# Patient Record
Sex: FEMALE | Race: WHITE | NOT HISPANIC OR LATINO | Employment: OTHER | URBAN - METROPOLITAN AREA
[De-identification: names, ages, dates, MRNs, and addresses within clinical notes are randomized per-mention and may not be internally consistent; named-entity substitution may affect disease eponyms.]

---

## 2017-03-22 ENCOUNTER — HOSPITAL ENCOUNTER (OUTPATIENT)
Dept: PAIN MEDICINE | Facility: HOSPITAL | Age: 77
Discharge: HOME | End: 2017-03-22
Attending: ANESTHESIOLOGY | Admitting: ANESTHESIOLOGY

## 2017-03-22 LAB
AMPHETAMINES UR QL SCN: NEGATIVE
BZE UR QL SCN: NEGATIVE
CREAT 24H UR-MCNC: NORMAL MG/DL
METHADONE UR QL SCN: NEGATIVE
OPIATE CONFIRMATION URINE: NORMAL
THC SERPLBLD CFM-MCNC: NEGATIVE NG/ML

## 2017-04-12 ENCOUNTER — HOSPITAL ENCOUNTER (OUTPATIENT)
Dept: PAIN MEDICINE | Facility: HOSPITAL | Age: 77
Discharge: HOME | End: 2017-04-12
Attending: ANESTHESIOLOGY | Admitting: ANESTHESIOLOGY

## 2017-05-18 ENCOUNTER — HOSPITAL ENCOUNTER (OUTPATIENT)
Dept: PAIN MEDICINE | Facility: HOSPITAL | Age: 77
Discharge: HOME | End: 2017-05-18
Attending: ANESTHESIOLOGY | Admitting: ANESTHESIOLOGY

## 2017-07-17 ENCOUNTER — HOSPITAL ENCOUNTER (OUTPATIENT)
Dept: PAIN MEDICINE | Facility: HOSPITAL | Age: 77
Discharge: HOME | End: 2017-07-17
Attending: ANESTHESIOLOGY | Admitting: ANESTHESIOLOGY

## 2017-09-01 ENCOUNTER — HOSPITAL ENCOUNTER (OUTPATIENT)
Dept: MRI IMAGING | Facility: HOSPITAL | Age: 77
Discharge: HOME | End: 2017-09-01
Attending: ANESTHESIOLOGY | Admitting: ANESTHESIOLOGY

## 2017-09-11 ENCOUNTER — HOSPITAL ENCOUNTER (OUTPATIENT)
Dept: PAIN MEDICINE | Facility: HOSPITAL | Age: 77
Discharge: HOME | End: 2017-09-11
Attending: ANESTHESIOLOGY | Admitting: ANESTHESIOLOGY

## 2017-09-29 ENCOUNTER — HOSPITAL ENCOUNTER (OUTPATIENT)
Dept: PAIN MEDICINE | Facility: HOSPITAL | Age: 77
Discharge: HOME | End: 2017-09-29
Attending: ANESTHESIOLOGY | Admitting: ANESTHESIOLOGY

## 2017-10-06 ENCOUNTER — TELEPHONE (OUTPATIENT)
Dept: NEUROSURGERY | Facility: CLINIC | Age: 77
End: 2017-10-06

## 2017-10-06 NOTE — TELEPHONE ENCOUNTER
Maxim requested Pt be moved up to morning appt @ [facility] due to afternoon hospital rounds.  Pt at first moved to 11:15am, then called back stating there is absolutely no way she can do a morning appointment AT ALL due to her arthritis.  She needs afternoon, as late as possible.  I gently explained that at this location, Dr Pan wants his morning full before he schedules afternoon and we fill from the top down.  Pt states that's fine, to just cancel her until she can be guaranteed the latest appointment of the day.  Routed note to New Pt appt  for FYI, Intake paperwork will be placed on her desk for holding.

## 2017-10-27 ENCOUNTER — HOSPITAL ENCOUNTER (OUTPATIENT)
Dept: PAIN MEDICINE | Facility: HOSPITAL | Age: 77
Discharge: HOME | End: 2017-10-27
Attending: ANESTHESIOLOGY | Admitting: ANESTHESIOLOGY

## 2017-12-15 ENCOUNTER — HOSPITAL ENCOUNTER (OUTPATIENT)
Dept: PAIN MEDICINE | Facility: HOSPITAL | Age: 77
Discharge: HOME | End: 2017-12-15
Attending: ANESTHESIOLOGY | Admitting: ANESTHESIOLOGY

## 2017-12-21 ENCOUNTER — HOSPITAL ENCOUNTER (OUTPATIENT)
Dept: PAIN MEDICINE | Facility: HOSPITAL | Age: 77
Discharge: HOME | End: 2017-12-21
Attending: PHYSICAL MEDICINE & REHABILITATION | Admitting: PHYSICAL MEDICINE & REHABILITATION

## 2018-02-09 ENCOUNTER — HOSPITAL ENCOUNTER (OUTPATIENT)
Dept: PAIN MEDICINE | Facility: HOSPITAL | Age: 78
Discharge: HOME | End: 2018-02-09
Attending: ANESTHESIOLOGY | Admitting: ANESTHESIOLOGY

## 2018-03-20 ENCOUNTER — HOSPITAL ENCOUNTER (OUTPATIENT)
Dept: PAIN MEDICINE | Facility: HOSPITAL | Age: 78
Discharge: HOME | End: 2018-03-20
Attending: ANESTHESIOLOGY | Admitting: ANESTHESIOLOGY

## 2018-04-13 ENCOUNTER — HOSPITAL ENCOUNTER (OUTPATIENT)
Dept: PAIN MEDICINE | Facility: HOSPITAL | Age: 78
Discharge: HOME | End: 2018-04-13
Attending: ANESTHESIOLOGY | Admitting: ANESTHESIOLOGY

## 2018-04-25 ENCOUNTER — HOSPITAL ENCOUNTER (OUTPATIENT)
Dept: PAIN MEDICINE | Facility: HOSPITAL | Age: 78
Discharge: HOME | End: 2018-04-25
Attending: ANESTHESIOLOGY | Admitting: ANESTHESIOLOGY

## 2018-05-09 ENCOUNTER — HOSPITAL ENCOUNTER (OUTPATIENT)
Dept: ORTHOPEDIC SURGERY | Facility: CLINIC | Age: 78
Discharge: HOME | End: 2018-05-09
Attending: ORTHOPAEDIC SURGERY | Admitting: ORTHOPAEDIC SURGERY

## 2018-05-17 ENCOUNTER — HOSPITAL ENCOUNTER (OUTPATIENT)
Dept: PAIN MEDICINE | Facility: HOSPITAL | Age: 78
Discharge: HOME | End: 2018-05-17
Attending: ANESTHESIOLOGY | Admitting: ANESTHESIOLOGY

## 2018-07-09 ENCOUNTER — HOSPITAL ENCOUNTER (OUTPATIENT)
Dept: LAB | Facility: HOSPITAL | Age: 78
Discharge: HOME | End: 2018-07-09
Attending: ORTHOPAEDIC SURGERY | Admitting: ORTHOPAEDIC SURGERY

## 2018-07-09 LAB
BILIRUB UR QL STRIP: NEGATIVE MG/DL
CASTS URNS QL MICRO: ABNORMAL /[LPF]
COLOR UR: YELLOW
CONV BACTERIA IN URINE MICRO: NEGATIVE
CONV CLARITY OF URINE: ABNORMAL
CONV HYALINE CASTS IN URINE MICRO: ABNORMAL /[LPF] (ref 0–5)
CONV PROTEIN IN URINE BY AUTOMATED TEST STRIP: NEGATIVE MG/DL
CONV SMALL ROUND CELLS: ABNORMAL /[HPF]
CONV UROBILINOGEN IN URINE BY AUTOMATED TEST STRIP: 0.2 MG/DL
CULTURE INDICATED?: ABNORMAL
GLUCOSE UR QL: NEGATIVE MG/DL
HGB UR QL STRIP: ABNORMAL
KETONES UR QL STRIP: NEGATIVE MG/DL
LEUKOCYTE ESTERASE UR QL STRIP: ABNORMAL
NITRITE UR QL STRIP: NEGATIVE
PH UR STRIP.AUTO: 6 [PH] (ref 4.5–8)
RBC #/AREA URNS HPF: 8 /[HPF] (ref 0–3)
SP GR UR: 1.02 (ref 1–1.03)
SPERM URNS QL MICRO: ABNORMAL /[HPF]
SQUAMOUS SPT QL MICRO: 26 /[HPF] (ref 0–5)
UNIDENT CRYS URNS QL MICRO: ABNORMAL /[HPF]
WBC #/AREA URNS HPF: 12 /[HPF] (ref 0–5)
YEAST SPEC QL WET PREP: ABNORMAL /[HPF]

## 2018-07-13 ENCOUNTER — HOSPITAL ENCOUNTER (OUTPATIENT)
Dept: PAIN MEDICINE | Facility: HOSPITAL | Age: 78
Discharge: HOME | End: 2018-07-13
Attending: ANESTHESIOLOGY | Admitting: ANESTHESIOLOGY

## 2018-09-20 ENCOUNTER — HOSPITAL ENCOUNTER (OUTPATIENT)
Dept: PAIN MEDICINE | Facility: HOSPITAL | Age: 78
Discharge: HOME | End: 2018-09-20
Attending: ANESTHESIOLOGY | Admitting: ANESTHESIOLOGY

## 2018-11-27 ENCOUNTER — HOSPITAL ENCOUNTER (OUTPATIENT)
Dept: PAIN MEDICINE | Facility: HOSPITAL | Age: 78
Discharge: HOME | End: 2018-11-27
Attending: ANESTHESIOLOGY | Admitting: ANESTHESIOLOGY

## 2018-11-27 LAB
AMPHETAMINES UR QL SCN: NEGATIVE
BARBITURATES UR QL SCN: NEGATIVE
BENZODIAZ UR QL SCN: NEGATIVE
BZE UR QL SCN: NEGATIVE
CREAT 24H UR-MCNC: ABNORMAL MG/DL
METHADONE UR QL SCN: NEGATIVE
OPIATE CONFIRMATION URINE: ABNORMAL
OPIATES TESTED UR SCN: ABNORMAL
PCP UR QL: NEGATIVE
THC SERPLBLD CFM-MCNC: NEGATIVE NG/ML

## 2019-02-05 ENCOUNTER — HOSPITAL ENCOUNTER (OUTPATIENT)
Dept: PAIN MEDICINE | Facility: HOSPITAL | Age: 79
Discharge: HOME | End: 2019-02-05
Attending: ANESTHESIOLOGY | Admitting: ANESTHESIOLOGY

## 2019-04-08 ENCOUNTER — HOSPITAL ENCOUNTER (OUTPATIENT)
Dept: PAIN MEDICINE | Facility: HOSPITAL | Age: 79
Discharge: HOME | End: 2019-04-08
Attending: ANESTHESIOLOGY | Admitting: ANESTHESIOLOGY

## 2019-06-24 RX ORDER — AMIODARONE HYDROCHLORIDE 400 MG/1
TABLET ORAL
Qty: 180 TABLET | Refills: 3 | Status: SHIPPED | OUTPATIENT
Start: 2019-06-24 | End: 2019-12-05 | Stop reason: ALTCHOICE

## 2019-07-10 ENCOUNTER — OFFICE VISIT (OUTPATIENT)
Dept: PAIN MEDICINE | Facility: CLINIC | Age: 79
End: 2019-07-10

## 2019-07-10 VITALS
TEMPERATURE: 98.5 F | BODY MASS INDEX: 29.02 KG/M2 | DIASTOLIC BLOOD PRESSURE: 80 MMHG | HEART RATE: 58 BPM | OXYGEN SATURATION: 95 % | SYSTOLIC BLOOD PRESSURE: 134 MMHG | HEIGHT: 64 IN | WEIGHT: 170 LBS | RESPIRATION RATE: 16 BRPM

## 2019-07-10 DIAGNOSIS — M46.1 SACROILIITIS (HCC): ICD-10-CM

## 2019-07-10 DIAGNOSIS — M25.50 POLYARTHRALGIA: ICD-10-CM

## 2019-07-10 DIAGNOSIS — M54.16 LUMBAR RADICULOPATHY: ICD-10-CM

## 2019-07-10 DIAGNOSIS — M48.062 SPINAL STENOSIS, LUMBAR REGION WITH NEUROGENIC CLAUDICATION: ICD-10-CM

## 2019-07-10 DIAGNOSIS — G89.4 CHRONIC PAIN SYNDROME: Primary | ICD-10-CM

## 2019-07-10 DIAGNOSIS — Z79.899 OTHER LONG TERM (CURRENT) DRUG THERAPY: ICD-10-CM

## 2019-07-10 DIAGNOSIS — M96.1 POSTLAMINECTOMY SYNDROME OF LUMBAR REGION: ICD-10-CM

## 2019-07-10 DIAGNOSIS — F19.90 CURRENT DRUG USE: Primary | ICD-10-CM

## 2019-07-10 PROBLEM — M79.2 NEUROPATHIC PAIN: Status: ACTIVE | Noted: 2017-05-18

## 2019-07-10 PROBLEM — M19.90 ARTHRITIS: Status: ACTIVE | Noted: 2017-05-04

## 2019-07-10 PROCEDURE — G0463 HOSPITAL OUTPT CLINIC VISIT: HCPCS | Performed by: ANESTHESIOLOGY

## 2019-07-10 PROCEDURE — 99214 OFFICE O/P EST MOD 30 MIN: CPT | Performed by: ANESTHESIOLOGY

## 2019-07-10 RX ORDER — LISINOPRIL 20 MG/1
TABLET ORAL
COMMUNITY
Start: 2018-07-05 | End: 2019-12-05 | Stop reason: ALTCHOICE

## 2019-07-10 RX ORDER — AMITRIPTYLINE HYDROCHLORIDE 25 MG/1
25 TABLET, FILM COATED ORAL
Refills: 0 | COMMUNITY
Start: 2019-05-26 | End: 2019-12-05 | Stop reason: ALTCHOICE

## 2019-07-10 RX ORDER — OXYCODONE AND ACETAMINOPHEN 10; 325 MG/1; MG/1
1 TABLET ORAL 3 TIMES DAILY PRN
Qty: 90 TABLET | Refills: 0 | Status: SHIPPED | OUTPATIENT
Start: 2019-07-10 | End: 2019-07-10 | Stop reason: SDUPTHER

## 2019-07-10 RX ORDER — APIXABAN 5 MG/1
5 TABLET, FILM COATED ORAL 2 TIMES DAILY
Refills: 0 | COMMUNITY
Start: 2019-04-16 | End: 2020-06-18 | Stop reason: SDUPTHER

## 2019-07-10 RX ORDER — FAMOTIDINE 20 MG/1
20 TABLET, FILM COATED ORAL DAILY
Refills: 0 | COMMUNITY
Start: 2019-06-19 | End: 2023-01-20 | Stop reason: HOSPADM

## 2019-07-10 RX ORDER — OXYCODONE AND ACETAMINOPHEN 10; 325 MG/1; MG/1
1 TABLET ORAL 3 TIMES DAILY PRN
Qty: 90 TABLET | Refills: 0 | Status: SHIPPED | OUTPATIENT
Start: 2019-07-10 | End: 2019-10-17

## 2019-07-10 RX ORDER — OXYCODONE AND ACETAMINOPHEN 10; 325 MG/1; MG/1
1 TABLET ORAL 3 TIMES DAILY PRN
Refills: 0 | COMMUNITY
Start: 2019-05-24 | End: 2019-07-10 | Stop reason: SDUPTHER

## 2019-07-10 RX ORDER — LEVOTHYROXINE SODIUM 0.07 MG/1
75 TABLET ORAL DAILY
Refills: 0 | COMMUNITY
Start: 2019-06-24 | End: 2021-12-07 | Stop reason: DRUGHIGH

## 2019-07-10 RX ORDER — GABAPENTIN 600 MG/1
TABLET ORAL EVERY 8 HOURS
COMMUNITY
Start: 2018-07-25 | End: 2019-10-24 | Stop reason: SDUPTHER

## 2019-07-10 RX ORDER — FUROSEMIDE 20 MG/1
20 TABLET ORAL 2 TIMES DAILY
Refills: 0 | COMMUNITY
Start: 2019-06-27 | End: 2021-03-22 | Stop reason: ALTCHOICE

## 2019-07-10 RX ORDER — ATENOLOL 50 MG/1
50 TABLET ORAL
Refills: 0 | COMMUNITY
Start: 2019-04-05 | End: 2019-12-05 | Stop reason: ALTCHOICE

## 2019-07-10 RX ORDER — METOPROLOL SUCCINATE 50 MG/1
TABLET, EXTENDED RELEASE ORAL
Refills: 0 | COMMUNITY
Start: 2019-06-28 | End: 2019-08-26 | Stop reason: SDUPTHER

## 2019-07-10 RX ORDER — DILTIAZEM HYDROCHLORIDE 60 MG/1
TABLET, FILM COATED ORAL EVERY 6 HOURS
COMMUNITY
Start: 2019-02-05 | End: 2019-12-05 | Stop reason: ALTCHOICE

## 2019-07-10 RX ORDER — RANITIDINE 150 MG/1
TABLET ORAL
COMMUNITY
End: 2019-12-05 | Stop reason: ALTCHOICE

## 2019-07-10 RX ORDER — POTASSIUM CHLORIDE 750 MG/1
TABLET, FILM COATED, EXTENDED RELEASE ORAL
Refills: 0 | COMMUNITY
Start: 2019-06-27 | End: 2019-12-05 | Stop reason: ALTCHOICE

## 2019-07-10 RX ORDER — PANTOPRAZOLE SODIUM 40 MG/1
40 TABLET, DELAYED RELEASE ORAL
Refills: 0 | COMMUNITY
Start: 2019-06-24

## 2019-07-10 NOTE — PROGRESS NOTES
CC joints, back pain, hank leg pain    79-year-old female with HTN, depression, chronic polyarthralgia from osteoarthritis, chronic back pain S/p L3-5 fusion 2018, Dr. Drake., here for follow-up.    Since last visit, hospitalization for CHF, discharged to rehab facility.  Continued oxycodone/hydrocodone while in House.      Chronic  low back pain and bilateral lower extremity pain worse with walking standing relieved by rest.  Denies saddle anesthesia, bladder or bowel incontinence.  Chronic polyarthralgia / left knee pain/ ilateral feet neuropathic pain    Still  limited in ADL due to bilateral lower extremity pain and weakness.  Ambulating with walker    Utilizes gabapentin,  oxycodone with good relief functional benefit without side effects.      L-spine x-ray :  Moderate degenerative changes//spondylosis,  loss of disc height worse at L4-5 and L5-S1.  Hank hip xray : mild hank degenrative changes.   L spine MRI 2017: Focal spinal stenosis at the L3-4 level with the neural canal being approximately 8 mm bilateral mild to moderate neural foraminal narrowing due to degenerative change of the facets in the broad-based disc bulge. Mild disc bulge at the   4/ 5 level with anterolisthesis of L4 on L5 which is stable.  C-spine CT 2017:  Degenerative changes spondylosis with central stenosis at C5-C6 and left foraminal stenosis C4/5    Pain Assessment   Location of Pain: Lower Back, R Hip, L Hip, R Leg, L Leg  Description of Pain: Dull/Aching, Throbbing, Stabbing  Previous Pain Rating : 8  Current Pain Ratin  Aggravating Factors: Activity  Alleviating Factors: Rest, Medication  Previous Treatments: Nerve Blocks/Injections, Epidural Steroids, Narcotic Pain Medication, Physical Therapy  Previous Diagnostic Studies: X-Ray, MRI      Past Medical History:     Reviewed :        Hypertension        Acid Reflux        Arthritis        Back pain        Neck pain        Hypothyroidism        IBS         "Osteoarthritis        Pneumonia: hospitalized 02/2019        Pain Mangement--Dr. Worthy    Past Surgical History:     Reviewed :        Hysterectomy        Cholecystectomy        Left Rotator Cuff Repair        Appendectomy        Back  surgery  July 2018 with Dr Drake    .  Social History     Tobacco Use   • Smoking status: Never Smoker   • Smokeless tobacco: Never Used   Substance Use Topics   • Alcohol use: No       Review of Systems        See HPI    General       Denies fever/chills, fatigue and sleep problems.    Eyes       Denies blurry vision and double vision.    ENT       Denies decreased hearing, sore throat and ears ringing.    CV       Denies chest pain and fainting.    Resp       Denies shortness of breath and cough.    GI       Denies heartburn, constipation, nausea, vomiting and diarrhea.           Denies pain on urination, incontinence and increased frequency.    MS       Complains of joint swelling and cramps.       Denies weakness.       Right shoulder pain, low back pain, bilateral hip pain left knee.    Derm       Denies rash and itching.    Neuro       Denies numbness, tingling, loss of balance and history of seizures.    Psych       Denies anxiety and depression.    Endo       Denies weight change and thirsty all the time.    Heme       Denies easy bruising, bleeding and enlarged lymph nodes.    Vitals:    07/10/19 1429   BP: 134/80   Pulse: 58   Resp: 16   Temp: 98.5 °F (36.9 °C)   SpO2: 95%   Weight: 77.1 kg (170 lb)   Height: 162.6 cm (64\")       Physical Exam   General  General appearance: well developed, well nourished, no acute distress  Gait and station: antalgic, walker    Mental Status Exam   Mental Status: AAO x3  Behavior: Appropriate    Cervical   ROM decreased w/ lateral rotation  Spurling's Test Negative  Palpation: Tender  Trapezius/LS  Comments: Right  paraspinal tenderness    Thoracolumbar   ROM: decreased rotation/extension  Straight Leg Raise: Negative  Palpation: " Tender  Paravertebral    Muscle Stretch Reflex   Sensory Intact to light touch/pin prick    Neuro   Reflexes: R Arm 1+  L arm 1+  R Leg 1+  L Leg 1+    Strength: Arms Normal  Strength: Legs Normal     Eyes:      Clear conjunctivae,      Pupils rounds and reactive  ENT:      Clear oropharynx,       Mucosa moist/pink       Nose: no deformity  Chest Wall:      Non tender      No deformities or masses noted.    Respiratory:      Clear bilaterally to auscultation.        No dyspnea  Heart:      Regular rate and rhythm, no murmurs, rubs, or gallops   Pulses:      Pulses 2+, symmetric  Extremities:      No clubbing, cyanosis, edema, or deformity noted   Neurologic:      No focal deficits      Coordination intact with rapid alternating movement.  Skin:      Intact without lesions or rashes.  No mass  Cervical Nodes:      No adenopathy noted.    Global pain scale on chart                    opioid risk tool low risk        Assessment/Plan  Catalina was seen today for arthritis and back pain.    Diagnoses and all orders for this visit:    Chronic pain syndrome    Postlaminectomy syndrome of lumbar region    Lumbar radiculopathy    Spinal stenosis, lumbar region with neurogenic claudication    Polyarthralgia    Sacroiliitis (CMS/HCC)    Other long term (current) drug therapy    Other orders  -     Discontinue: oxyCODONE-acetaminophen (PERCOCET)  MG per tablet; Take 1 tablet by mouth 3 (Three) Times a Day As Needed for Severe Pain . for pain  -     Discontinue: oxyCODONE-acetaminophen (PERCOCET)  MG per tablet; Take 1 tablet by mouth 3 (Three) Times a Day As Needed for Severe Pain . for pain  -     oxyCODONE-acetaminophen (PERCOCET)  MG per tablet; Take 1 tablet by mouth 3 (Three) Times a Day As Needed for Severe Pain . for pain      Summary:   79-year-old female with HTN, depression, chronic polyarthralgia from osteoarthritis, chronic back pain from DDD/ spondylosis/stenosis, S/p L3-5 fusion 7/2018, Dr. Majd  seen in  follow-up.     Chronic lower lumbar /coccyx pain and RLE radicular pain, continued bilateral lower extremity weakness.  Hospitalization for CHF.  Improved inpatient rehab.  Prescribe oxycodone and hydrocodone while inpatient.   Cont gabapentin 600 three times a day      Continue Oxycodone/APAP 10/325 decrease to 3 times daily as needed for severe pain. UDS and Inspect reviewed.   Discussed risk of tolerance, dependence, respiratory depression, coma and death associated with use of oral opioids for treatment of chronic nonmalignant pain.    Continue flexeril    RTC  in 3 mo.

## 2019-07-17 ENCOUNTER — OFFICE VISIT (OUTPATIENT)
Dept: ORTHOPEDIC SURGERY | Facility: CLINIC | Age: 79
End: 2019-07-17

## 2019-07-17 VITALS
HEIGHT: 64 IN | SYSTOLIC BLOOD PRESSURE: 151 MMHG | DIASTOLIC BLOOD PRESSURE: 69 MMHG | WEIGHT: 157.4 LBS | HEART RATE: 59 BPM | BODY MASS INDEX: 26.87 KG/M2

## 2019-07-17 DIAGNOSIS — M51.17 INTERVERTEBRAL DISC DISORDER WITH RADICULOPATHY OF LUMBOSACRAL REGION: Primary | ICD-10-CM

## 2019-07-17 PROCEDURE — 99213 OFFICE O/P EST LOW 20 MIN: CPT | Performed by: ORTHOPAEDIC SURGERY

## 2019-07-17 NOTE — PROGRESS NOTES
Visit Type: Follow Up  Referring Provider: No ref. provider found  Primary Care Provider: Rubio Dover MD    Patient Name: Catalina Moreno  Patient Age: 79 y.o.  Chief Complaint: had concerns including Pain of the Lumbar Spine and Pain of the Cervical Spine.    Subjective   History of Present Illness: This patient is a pleasant 79 years old female who complains of axial lower back pain with radiation of pain to her right buttock patient is a status post lumbar decompression of L4-L5 last year patient had MRI recently which is compatible with spinal stenosis and synovial cyst at the level of L3-L4, she underwent a course of pain management without significant benefit      Review of Systems   Constitutional: Positive for fatigue.   Cardiovascular:        Coronary artery disease   Genitourinary: Positive for flank pain and frequency.   Musculoskeletal: Positive for back pain and myalgias.       The following portions of the patient's history were reviewed and updated as appropriate: allergies, current medications, past family history, past medical history, past social history, past surgical history and problem list.    Past Medical History:   Diagnosis Date   • Arthritis    • Depression    • GERD (gastroesophageal reflux disease)    • Hypertension    • Hypothyroidism        Past Surgical History:   Procedure Laterality Date   • BACK SURGERY  07/19/2018    PDC &  PSF L3-L5 insitu   • CHOLECYSTECTOMY     • HYSTERECTOMY     • ROTATOR CUFF REPAIR Left        Vitals:    07/17/19 1510   BP: 151/69   Pulse: 59       Patient Active Problem List   Diagnosis   • Arthritis   • Neuropathic pain   • Other long term (current) drug therapy   • Polyarthralgia   • Sacroiliitis (CMS/HCC)       Family History Summary:  family history includes Cancer in her father, paternal aunt, and paternal uncle; Diabetes in her son; Heart disease in her paternal aunt.    Social History     Socioeconomic History   • Marital status:       Spouse name: Not on file   • Number of children: Not on file   • Years of education: Not on file   • Highest education level: Not on file   Tobacco Use   • Smoking status: Never Smoker   • Smokeless tobacco: Never Used   Substance and Sexual Activity   • Alcohol use: No   • Drug use: No   • Sexual activity: Defer         Current Outpatient Medications:   •  amiodarone (PACERONE) 400 MG tablet, take 1 tablet by mouth twice a day, Disp: 180 tablet, Rfl: 3  •  amitriptyline (ELAVIL) 25 MG tablet, Take 25 mg by mouth every night at bedtime., Disp: , Rfl: 0  •  atenolol (TENORMIN) 50 MG tablet, Take 50 mg by mouth every night at bedtime., Disp: , Rfl: 0  •  diltiaZEM (CARDIZEM) 60 MG tablet, Every 6 (Six) Hours., Disp: , Rfl:   •  ELIQUIS 5 MG tablet tablet, Take 5 mg by mouth 2 (Two) Times a Day., Disp: , Rfl: 0  •  famotidine (PEPCID) 20 MG tablet, Take 20 mg by mouth 2 (Two) Times a Day., Disp: , Rfl: 0  •  furosemide (LASIX) 20 MG tablet, , Disp: , Rfl: 0  •  gabapentin (NEURONTIN) 600 MG tablet, Every 8 (Eight) Hours., Disp: , Rfl:   •  levothyroxine (SYNTHROID, LEVOTHROID) 50 MCG tablet, , Disp: , Rfl: 0  •  lisinopril (PRINIVIL,ZESTRIL) 20 MG tablet, lisinopril 20 mg tablet  take 1 tablet by mouth once daily, Disp: , Rfl:   •  metFORMIN (GLUCOPHAGE) 500 MG tablet, Take 250 mg by mouth 2 (Two) Times a Day., Disp: , Rfl: 0  •  metoprolol succinate XL (TOPROL-XL) 50 MG 24 hr tablet, , Disp: , Rfl: 0  •  oxyCODONE-acetaminophen (PERCOCET)  MG per tablet, Take 1 tablet by mouth 3 (Three) Times a Day As Needed for Severe Pain . for pain, Disp: 90 tablet, Rfl: 0  •  pantoprazole (PROTONIX) 40 MG EC tablet, , Disp: , Rfl: 0  •  potassium chloride (K-DUR) 10 MEQ CR tablet, , Disp: , Rfl: 0  •  raNITIdine (ZANTAC) 150 MG tablet, ZANTAC 150 MG TABS, Disp: , Rfl:     Allergies   Allergen Reactions   • Baclofen Rash   • Codeine Nausea Only   • Ibuprofen Unknown (See Comments)     Patient doesn't know   •  Methocarbamol Unknown (See Comments)     Patient doesn't know   • Naproxen Unknown (See Comments)     Pt. Doesn't know    • Tizanidine Hcl Hallucinations   • Tolmetin Unknown (See Comments)     Pt. Doesn't know           Objective   Physical Exam  Back Exam     Tenderness   The patient is experiencing tenderness in the lumbar.    Range of Motion   Extension:  40 abnormal   Flexion:  20 abnormal   Lateral bend right:  20 abnormal   Lateral bend left:  10 abnormal   Rotation right:  0 abnormal   Rotation left:  0 abnormal     Muscle Strength   The patient has normal back strength.    Tests   Straight leg raise right: positive at 60 deg  Straight leg raise left: negative    Reflexes   Patellar: 1/4  Achilles: 1/4  Biceps: normal  Babinski's sign: normal     Other   Toe walk: abnormal  Heel walk: abnormal  Sensation: normal  Gait: abnormal               Impression and Plan: This patient is here today for follow-up patient is a status post previous L4-L5 decompression due to spinal foraminal stenosis and degenerative spondylolisthesis of L4-L5 is complaining of minimal axial lower back pain intractable pain in her right buttock and right leg with difficulty walking, her lumbar spine MRI is compatible with disc degeneration spinal foraminal stenosis calcified 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  Patient has a history of coronary artery disease chronic kidney disease and I had a chance to talk to Dr. Bowen her PCP for medical evaluation to see if she can tolerate lumbar decompression of L3-L4, she is going to see her tomorrow for medical clearance and if she is agreeable to proceed with the procedure she is going to let me know.    Intervertebral disc disorder with radiculopathy of lumbosacral region [M51.17]     Orders Placed This Encounter   Procedures   • XR Spine Lumbar AP & Lateral     Scheduling Instructions:       16      0836      Lspine ap and lat      Low back pain, post op 7/18/19 PDC &  PSF L3-L5 insitu      Order Specific Question:   Reason for Exam:     Answer:   low back pain               Maldonado Drake MD  07/17/19  4:08 PM

## 2019-08-08 ENCOUNTER — OFFICE VISIT (OUTPATIENT)
Dept: CARDIOLOGY | Facility: CLINIC | Age: 79
End: 2019-08-08

## 2019-08-08 VITALS
DIASTOLIC BLOOD PRESSURE: 83 MMHG | OXYGEN SATURATION: 96 % | HEIGHT: 64 IN | BODY MASS INDEX: 26.29 KG/M2 | HEART RATE: 93 BPM | WEIGHT: 154 LBS | SYSTOLIC BLOOD PRESSURE: 168 MMHG

## 2019-08-08 DIAGNOSIS — I10 ESSENTIAL HYPERTENSION: ICD-10-CM

## 2019-08-08 DIAGNOSIS — I36.1 NON-RHEUMATIC TRICUSPID VALVE INSUFFICIENCY: ICD-10-CM

## 2019-08-08 DIAGNOSIS — I50.812 CHRONIC RIGHT-SIDED CONGESTIVE HEART FAILURE (HCC): ICD-10-CM

## 2019-08-08 DIAGNOSIS — I48.0 PAROXYSMAL ATRIAL FIBRILLATION (HCC): Primary | ICD-10-CM

## 2019-08-08 PROBLEM — R42 LIGHTHEADEDNESS: Status: ACTIVE | Noted: 2018-07-05

## 2019-08-08 PROBLEM — E03.9 HYPOTHYROIDISM: Status: ACTIVE | Noted: 2017-05-04

## 2019-08-08 PROBLEM — M79.7 PRIMARY FIBROMYALGIA SYNDROME: Status: ACTIVE | Noted: 2017-05-04

## 2019-08-08 PROBLEM — K21.9 GASTROESOPHAGEAL REFLUX DISEASE: Status: ACTIVE | Noted: 2017-05-04

## 2019-08-08 PROBLEM — F32.A DEPRESSIVE DISORDER: Status: ACTIVE | Noted: 2017-05-04

## 2019-08-08 PROCEDURE — 99214 OFFICE O/P EST MOD 30 MIN: CPT | Performed by: INTERNAL MEDICINE

## 2019-08-08 RX ORDER — FERROUS SULFATE 325(65) MG
TABLET ORAL
Refills: 0 | COMMUNITY
Start: 2019-07-22 | End: 2019-12-05 | Stop reason: ALTCHOICE

## 2019-08-08 RX ORDER — SERTRALINE HYDROCHLORIDE 100 MG/1
100 TABLET, FILM COATED ORAL DAILY
Refills: 0 | COMMUNITY
Start: 2019-07-19 | End: 2019-12-05 | Stop reason: ALTCHOICE

## 2019-08-08 NOTE — PROGRESS NOTES
"    Subjective:     Encounter Date:08/08/2019      Patient ID: Catalina Moreno is a 79 y.o. female.    Chief Complaint:  History of Present Illness  79year-old white female patient with history of paroxysmal atrial fibrillation hypertension problems with fluid overload is back for follow-up  Patient had a stress Myoview January 2019 which showed no ischemia  Echocardiogram was done recently in June 2019 which showed LVEF 60% moderate to severe pulmonary hypertension moderate to severe tricuspid insufficiency RV enlargement  She had problems with fluid overload balance problems  She is having some venous insufficiency  Advised tight stockings  Continue anticoagulation  I will check electrolytes and BNP follow-up in 1 week  Continue low-dose diuretics for now           /83 (BP Location: Right arm, Patient Position: Sitting, Cuff Size: Adult)   Pulse 93   Ht 162.6 cm (64\")   Wt 69.9 kg (154 lb)   SpO2 96%   BMI 26.43 kg/m²     Past Medical History:   Diagnosis Date   • Arthritis    • Depression    • GERD (gastroesophageal reflux disease)    • Hypertension    • Hypothyroidism      Past Surgical History:   Procedure Laterality Date   • BACK SURGERY  07/19/2018    PDC &  PSF L3-L5 insitu   • CHOLECYSTECTOMY     • HYSTERECTOMY     • ROTATOR CUFF REPAIR Left      Social History     Socioeconomic History   • Marital status:      Spouse name: Not on file   • Number of children: Not on file   • Years of education: Not on file   • Highest education level: Not on file   Tobacco Use   • Smoking status: Never Smoker   • Smokeless tobacco: Never Used   Substance and Sexual Activity   • Alcohol use: No   • Drug use: No   • Sexual activity: Defer     Family History   Problem Relation Age of Onset   • Cancer Father    • Diabetes Son    • Cancer Paternal Aunt    • Heart disease Paternal Aunt    • Cancer Paternal Uncle        Current Outpatient Medications:   •  amiodarone (PACERONE) 400 MG tablet, take 1 tablet by " mouth twice a day, Disp: 180 tablet, Rfl: 3  •  atenolol (TENORMIN) 50 MG tablet, Take 50 mg by mouth every night at bedtime., Disp: , Rfl: 0  •  diltiaZEM (CARDIZEM) 60 MG tablet, Every 6 (Six) Hours., Disp: , Rfl:   •  ELIQUIS 5 MG tablet tablet, Take 5 mg by mouth 2 (Two) Times a Day., Disp: , Rfl: 0  •  FEROSUL 325 (65 Fe) MG tablet, , Disp: , Rfl: 0  •  furosemide (LASIX) 20 MG tablet, , Disp: , Rfl: 0  •  levothyroxine (SYNTHROID, LEVOTHROID) 50 MCG tablet, , Disp: , Rfl: 0  •  lisinopril (PRINIVIL,ZESTRIL) 20 MG tablet, lisinopril 20 mg tablet  take 1 tablet by mouth once daily, Disp: , Rfl:   •  metFORMIN (GLUCOPHAGE) 500 MG tablet, Take 250 mg by mouth 2 (Two) Times a Day., Disp: , Rfl: 0  •  metoprolol succinate XL (TOPROL-XL) 50 MG 24 hr tablet, , Disp: , Rfl: 0  •  oxyCODONE-acetaminophen (PERCOCET)  MG per tablet, Take 1 tablet by mouth 3 (Three) Times a Day As Needed for Severe Pain . for pain, Disp: 90 tablet, Rfl: 0  •  pantoprazole (PROTONIX) 40 MG EC tablet, , Disp: , Rfl: 0  •  potassium chloride (K-DUR) 10 MEQ CR tablet, , Disp: , Rfl: 0  •  sertraline (ZOLOFT) 100 MG tablet, Take 100 mg by mouth Daily., Disp: , Rfl: 0  •  amitriptyline (ELAVIL) 25 MG tablet, Take 25 mg by mouth every night at bedtime., Disp: , Rfl: 0  •  famotidine (PEPCID) 20 MG tablet, Take 20 mg by mouth 2 (Two) Times a Day., Disp: , Rfl: 0  •  gabapentin (NEURONTIN) 600 MG tablet, Every 8 (Eight) Hours., Disp: , Rfl:   •  raNITIdine (ZANTAC) 150 MG tablet, ZANTAC 150 MG TABS, Disp: , Rfl:   Allergies   Allergen Reactions   • Baclofen Rash   • Codeine Nausea Only   • Ibuprofen Unknown (See Comments)     Patient doesn't know   • Methocarbamol Unknown (See Comments)     Patient doesn't know   • Naproxen Unknown (See Comments)     Pt. Doesn't know    • Tizanidine Hcl Hallucinations   • Tolmetin Unknown (See Comments)     Pt. Doesn't know       Review of Systems   Constitution: Positive for diaphoresis. Negative for fever  and malaise/fatigue.   HENT: Negative for congestion and hearing loss.    Eyes: Negative for double vision and visual disturbance.   Cardiovascular: Positive for leg swelling. Negative for chest pain, claudication, dyspnea on exertion and syncope.   Respiratory: Negative for cough and shortness of breath.    Endocrine: Negative for cold intolerance.   Skin: Negative for color change and rash.   Musculoskeletal: Negative for arthritis and joint pain.   Gastrointestinal: Negative for abdominal pain and heartburn.   Genitourinary: Negative for hematuria.   Neurological: Positive for dizziness. Negative for excessive daytime sleepiness.   Psychiatric/Behavioral: Negative for depression. The patient is not nervous/anxious.    All other systems reviewed and are negative.             Objective:     Physical Exam   Constitutional: She is oriented to person, place, and time. She appears well-developed and well-nourished. She is cooperative.   HENT:   Head: Normocephalic and atraumatic.   Mouth/Throat: Uvula is midline and oropharynx is clear and moist. No oral lesions.   Eyes: Conjunctivae are normal. No scleral icterus.   Neck: Trachea normal. Neck supple. No JVD present. Carotid bruit is not present. No thyromegaly present.   Cardiovascular: Normal rate, regular rhythm, S1 normal, S2 normal, normal heart sounds, intact distal pulses and normal pulses. PMI is not displaced. Exam reveals no gallop and no friction rub.   No murmur heard.  Pulmonary/Chest: Effort normal and breath sounds normal.   Abdominal: Soft. Bowel sounds are normal.   Musculoskeletal: Normal range of motion. She exhibits edema.   Neurological: She is alert and oriented to person, place, and time. She has normal strength.   No focal deficits   Skin: Skin is warm. No cyanosis.   Venous stasis noted   Psychiatric: She has a normal mood and affect.       Procedures    Lab Review:       Assessment:          Diagnosis Plan   1. Paroxysmal atrial fibrillation  (CMS/HCC)     2. Chronic right-sided congestive heart failure (CMS/HCC)     3. Non-rheumatic tricuspid valve insufficiency     4. Essential hypertension            Plan:       Paroxysmal it fibrillation continue anticoagulation  Somewhat moderate to severe pulmonary hypertension RV dilatation and severe tricuspid insufficiency was probably causing fluid overload  We will check electrolytes and BNP  Diuresis for now at  low-dose  Probably will need pulmonary evaluation in the future

## 2019-08-26 RX ORDER — METOPROLOL SUCCINATE 50 MG/1
50 TABLET, EXTENDED RELEASE ORAL DAILY
Qty: 30 TABLET | Refills: 5 | Status: SHIPPED | OUTPATIENT
Start: 2019-08-26 | End: 2019-12-05 | Stop reason: ALTCHOICE

## 2019-09-04 ENCOUNTER — OFFICE (AMBULATORY)
Dept: URBAN - METROPOLITAN AREA PATHOLOGY 4 | Facility: PATHOLOGY | Age: 79
End: 2019-09-04
Payer: MEDICARE

## 2019-09-04 ENCOUNTER — ON CAMPUS - OUTPATIENT (AMBULATORY)
Dept: URBAN - METROPOLITAN AREA HOSPITAL 2 | Facility: HOSPITAL | Age: 79
End: 2019-09-04
Payer: MEDICARE

## 2019-09-04 VITALS
HEART RATE: 63 BPM | DIASTOLIC BLOOD PRESSURE: 57 MMHG | DIASTOLIC BLOOD PRESSURE: 59 MMHG | HEIGHT: 64 IN | WEIGHT: 151 LBS | DIASTOLIC BLOOD PRESSURE: 46 MMHG | DIASTOLIC BLOOD PRESSURE: 75 MMHG | RESPIRATION RATE: 16 BRPM | HEART RATE: 66 BPM | TEMPERATURE: 97.4 F | SYSTOLIC BLOOD PRESSURE: 112 MMHG | SYSTOLIC BLOOD PRESSURE: 146 MMHG | DIASTOLIC BLOOD PRESSURE: 64 MMHG | HEART RATE: 70 BPM | RESPIRATION RATE: 18 BRPM | OXYGEN SATURATION: 93 % | RESPIRATION RATE: 19 BRPM | SYSTOLIC BLOOD PRESSURE: 94 MMHG | SYSTOLIC BLOOD PRESSURE: 122 MMHG | HEART RATE: 60 BPM | HEART RATE: 74 BPM | OXYGEN SATURATION: 99 % | DIASTOLIC BLOOD PRESSURE: 65 MMHG | HEART RATE: 73 BPM | DIASTOLIC BLOOD PRESSURE: 62 MMHG | DIASTOLIC BLOOD PRESSURE: 58 MMHG | HEART RATE: 71 BPM | OXYGEN SATURATION: 100 % | SYSTOLIC BLOOD PRESSURE: 130 MMHG | HEART RATE: 72 BPM | SYSTOLIC BLOOD PRESSURE: 132 MMHG | SYSTOLIC BLOOD PRESSURE: 143 MMHG

## 2019-09-04 DIAGNOSIS — D12.2 BENIGN NEOPLASM OF ASCENDING COLON: ICD-10-CM

## 2019-09-04 DIAGNOSIS — R19.4 CHANGE IN BOWEL HABIT: ICD-10-CM

## 2019-09-04 DIAGNOSIS — K59.00 CONSTIPATION, UNSPECIFIED: ICD-10-CM

## 2019-09-04 DIAGNOSIS — R63.4 ABNORMAL WEIGHT LOSS: ICD-10-CM

## 2019-09-04 DIAGNOSIS — K62.5 HEMORRHAGE OF ANUS AND RECTUM: ICD-10-CM

## 2019-09-04 DIAGNOSIS — K64.1 SECOND DEGREE HEMORRHOIDS: ICD-10-CM

## 2019-09-04 LAB
GI HISTOLOGY: A. UNSPECIFIED: (no result)
GI HISTOLOGY: PDF REPORT: (no result)

## 2019-09-04 PROCEDURE — 88305 TISSUE EXAM BY PATHOLOGIST: CPT | Performed by: INTERNAL MEDICINE

## 2019-09-04 PROCEDURE — 45385 COLONOSCOPY W/LESION REMOVAL: CPT | Performed by: INTERNAL MEDICINE

## 2019-09-04 PROCEDURE — 88305 TISSUE EXAM BY PATHOLOGIST: CPT | Mod: 26 | Performed by: INTERNAL MEDICINE

## 2019-09-04 PROCEDURE — 45381 COLONOSCOPY SUBMUCOUS NJX: CPT | Performed by: INTERNAL MEDICINE

## 2019-09-04 NOTE — SERVICEHPINOTES
LAUREANO MAX  is a  79  female   who presents today for a  Colonoscopy   for   the indications listed below. The updated Patient Profile was reviewed prior to the procedure, in conjunction with the Physical Exam, including medical conditions, surgical procedures, medications, allergies, family history and social history. See Physical Exam time stamp below for date and time of HPI completion.Pre-operatively, I reviewed the indication(s) for the procedure, the risks of the procedure [including but not limited to: unexpected bleeding possibly requiring hospitalization and/or unplanned repeat procedures, perforation possibly requiring surgical treatment, missed lesions and complications of sedation/MAC (also explained by anesthesia staff)]. I have evaluated the patient for risks associated with the planned anesthesia and the procedure to be performed and find the patient an acceptable candidate for IV sedation.Multiple opportunities were provided for any questions or concerns, and all questions were answered satisfactorily before any anesthesia was administered. We will proceed with the planned procedure.BR

## 2019-09-05 ENCOUNTER — LAB REQUISITION (OUTPATIENT)
Dept: LAB | Facility: HOSPITAL | Age: 79
End: 2019-09-05

## 2019-09-05 DIAGNOSIS — K64.1 SECOND DEGREE HEMORRHOIDS: ICD-10-CM

## 2019-09-05 DIAGNOSIS — K59.00 CONSTIPATION: ICD-10-CM

## 2019-09-05 DIAGNOSIS — K62.5 HEMORRHAGE OF ANUS AND RECTUM: ICD-10-CM

## 2019-09-05 DIAGNOSIS — R63.4 ABNORMAL WEIGHT LOSS: ICD-10-CM

## 2019-09-05 DIAGNOSIS — R19.4 CHANGE IN BOWEL HABITS: ICD-10-CM

## 2019-09-05 DIAGNOSIS — D12.2 BENIGN NEOPLASM OF ASCENDING COLON: ICD-10-CM

## 2019-09-06 LAB
LAB AP CASE REPORT: NORMAL
PATH REPORT.FINAL DX SPEC: NORMAL
PATH REPORT.GROSS SPEC: NORMAL

## 2019-10-10 ENCOUNTER — OFFICE VISIT (OUTPATIENT)
Dept: PAIN MEDICINE | Facility: CLINIC | Age: 79
End: 2019-10-10

## 2019-10-10 VITALS
BODY MASS INDEX: 26.12 KG/M2 | HEART RATE: 68 BPM | WEIGHT: 153 LBS | RESPIRATION RATE: 16 BRPM | TEMPERATURE: 97.8 F | DIASTOLIC BLOOD PRESSURE: 75 MMHG | HEIGHT: 64 IN | SYSTOLIC BLOOD PRESSURE: 164 MMHG | OXYGEN SATURATION: 100 %

## 2019-10-10 DIAGNOSIS — M25.50 POLYARTHRALGIA: ICD-10-CM

## 2019-10-10 DIAGNOSIS — M96.1 POSTLAMINECTOMY SYNDROME OF LUMBAR REGION: ICD-10-CM

## 2019-10-10 DIAGNOSIS — G89.4 CHRONIC PAIN SYNDROME: Primary | ICD-10-CM

## 2019-10-10 DIAGNOSIS — Z79.899 HIGH RISK MEDICATION USE: ICD-10-CM

## 2019-10-10 DIAGNOSIS — M48.062 LUMBAR STENOSIS WITH NEUROGENIC CLAUDICATION: ICD-10-CM

## 2019-10-10 PROCEDURE — G0463 HOSPITAL OUTPT CLINIC VISIT: HCPCS | Performed by: ANESTHESIOLOGY

## 2019-10-10 PROCEDURE — 99214 OFFICE O/P EST MOD 30 MIN: CPT | Performed by: ANESTHESIOLOGY

## 2019-10-10 RX ORDER — HYDROCODONE BITARTRATE AND ACETAMINOPHEN 10; 325 MG/1; MG/1
1 TABLET ORAL 4 TIMES DAILY PRN
Qty: 120 TABLET | Refills: 0 | Status: SHIPPED | OUTPATIENT
Start: 2019-10-10 | End: 2019-10-17 | Stop reason: SDUPTHER

## 2019-10-17 ENCOUNTER — HOSPITAL ENCOUNTER (OUTPATIENT)
Dept: PAIN MEDICINE | Facility: HOSPITAL | Age: 79
Discharge: HOME | End: 2019-10-17
Admitting: ANESTHESIOLOGY

## 2019-10-17 VITALS
WEIGHT: 153 LBS | HEIGHT: 64 IN | DIASTOLIC BLOOD PRESSURE: 82 MMHG | RESPIRATION RATE: 16 BRPM | BODY MASS INDEX: 26.12 KG/M2 | OXYGEN SATURATION: 99 % | HEART RATE: 68 BPM | SYSTOLIC BLOOD PRESSURE: 175 MMHG | TEMPERATURE: 98.2 F

## 2019-10-17 DIAGNOSIS — M54.16 LUMBAR RADICULOPATHY: Primary | ICD-10-CM

## 2019-10-17 PROCEDURE — 62323 NJX INTERLAMINAR LMBR/SAC: CPT | Performed by: ANESTHESIOLOGY

## 2019-10-17 PROCEDURE — 25010000002 METHYLPREDNISOLONE PER 40 MG

## 2019-10-17 RX ORDER — HYDROCODONE BITARTRATE AND ACETAMINOPHEN 10; 325 MG/1; MG/1
1 TABLET ORAL 4 TIMES DAILY PRN
Qty: 120 TABLET | Refills: 0 | Status: SHIPPED | OUTPATIENT
Start: 2019-10-17 | End: 2019-12-05

## 2019-10-17 RX ORDER — METHYLPREDNISOLONE ACETATE 40 MG/ML
INJECTION, SUSPENSION INTRA-ARTICULAR; INTRALESIONAL; INTRAMUSCULAR; SOFT TISSUE
Status: DISCONTINUED
Start: 2019-10-17 | End: 2019-10-18 | Stop reason: HOSPADM

## 2019-10-17 RX ORDER — BUPIVACAINE HYDROCHLORIDE 5 MG/ML
INJECTION, SOLUTION EPIDURAL; INTRACAUDAL
Status: DISCONTINUED
Start: 2019-10-17 | End: 2019-10-18 | Stop reason: HOSPADM

## 2019-10-17 NOTE — PATIENT INSTRUCTIONS
Epidural Steroid Injection, Care After  Refer to this sheet in the next few weeks. These instructions provide you with information about caring for yourself after your procedure. Your health care provider may also give you more specific instructions. Your treatment has been planned according to current medical practices, but problems sometimes occur. Call your health care provider if you have any problems or questions after your procedure.  What can I expect after the procedure?  After your procedure, it is common to feel a little discomfort at the injection site.  Follow these instructions at home:  · For 24 hours after the procedure:  ? Avoid using heat on the injection site.  ? Do not take a tub bath, and do not soak in water.  ? Do not drive if you received a medicine to help you relax (sedative).  · If directed, put ice on the injection site:  ? Put ice in a plastic bag.  ? Place a towel between your skin and the bag.  ? Leave the ice on for 20 minutes, 2-3 times a day.  · Return to your normal activities as told by your health care provider. Ask your health care provider what activities are safe for you.  · You may remove the bandage (dressing) after 24 hours.  · Take over-the-counter and prescription medicines only as told by your health care provider.  · Keep all follow-up visits as told by your health care provider. This is important.  Contact a health care provider if:  · You have a fever.  · You continue to have pain and soreness around the injection site, even after taking over-the-counter pain medicine.  · You have severe, sudden, or lasting nausea or vomiting.  Get help right away if:  · You have severe pain at the injection site that is not relieved by medicines.  · You develop a severe headache or a stiff neck.  · You become sensitive to light.  · You have any new numbness or weakness in your legs or arms.  · You lose control of your bladder or bowel movements.  · You have trouble breathing.  This  information is not intended to replace advice given to you by your health care provider. Make sure you discuss any questions you have with your health care provider.  Document Released: 04/03/2012 Document Revised: 05/25/2017 Document Reviewed: 04/04/2017  [employer] Interactive Patient Education © 2019 [employer]

## 2019-10-17 NOTE — PROCEDURES
"Subjective    Cc back pain/BLE pain  Catalina Moreno is a 79 y.o. female with lumbar radiculitis postlaminectomy syndrome here for caudal STEPHAN.  Off Eliquis 5 days.     Pain Assessment   Location of Pain: Lower Back, R Hip, L Hip, L Leg, neck pain, joint  Description of Pain: Dull/Aching, Throbbing, Stabbing  Previous Pain Rating :8  Current Pain Ratin  Aggravating Factors: Activity  Alleviating Factors: Rest, Medication    The following portions of the patient's history were reviewed and updated as appropriate: allergies, current medications, past family history, past medical history, past social history, past surgical history and problem list.    Review of Systems  AS in HPI  Objective   Physical Exam   Constitutional: She is oriented to person, place, and time. She appears well-developed. No distress.   Cardiovascular: Normal rate.   Pulmonary/Chest: Effort normal.   Musculoskeletal:        Lumbar back: She exhibits decreased range of motion and tenderness.   Neurological: She is alert and oriented to person, place, and time.   Psychiatric: She has a normal mood and affect.     /82 (BP Location: Right arm, Patient Position: Sitting)   Pulse 68   Temp 98.2 °F (36.8 °C) (Oral)   Resp 16   Ht 162.6 cm (64\")   Wt 69.4 kg (153 lb)   SpO2 99%   BMI 26.26 kg/m²     Assessment/Plan     Underwent caudal STEPHAN.  RTC 6 weeks  Refill hydrocodone.  UDS and inspect reviewed    DATE OF PROCEDURE: Oct 17, 2019    PREOPERATIVE DIAGNOSIS:  lumbosacral DDD and radiculitis  POSTOPERATIVE DIAGNOSIS: Same    PROCEDURE PERFORMED: Caudal Epidural Steroid Injection    The patient presents with a history of  lumbosacral degenerative disc disease with lumbosacral neuritis. The patient presents today for a caudal epidural steroid injection. The patient understands the risks and benefits of the procedure and wishes to proceed. The patient was seen in the preoperative area.  Patient's consent was obtained and updated.  Vitals " were taken.  Patient was then brought to the procedure suite and placed in a prone position. The appropriate anatomic area was widely prepped with  Chloraprep and draped in a sterile fashion. Noninvasive monitoring per routine anesthesia protocol was placed.  Under fluoroscopic guidance using a lateral view a 22 guage curved spinal needle was passed through skin anesthesized with 1% Lidocaine without epinephrine.  The needle was advanced through the sacral hiatus and into the sacral epidural space using fluoroscopic guidance. Needle tip placement in the epidural space was confirmed by loss of resistance and injection of  1.5  mL of  preservative free contrast. Following this 10 mL of a solution containing  1 mL of 40 mg Depo-Medrol,  1 mL of  0.25% bupivacaine and 8 mL of preservative-free saline was carefully administered in the epidural space.   A sterile dressing was placed over the puncture site.    The patient tolerated the procedure with  no complications. They were then brought to the post procedure area where they recovered nicely.     Discharge:  The patient will be discharged home in stable condition.   Patient understands to contact the Center with any post procedure questions or concerns.  Discharge instructions given by nursing staff.

## 2019-10-24 ENCOUNTER — TELEPHONE (OUTPATIENT)
Dept: PAIN MEDICINE | Facility: CLINIC | Age: 79
End: 2019-10-24

## 2019-10-24 RX ORDER — GABAPENTIN 600 MG/1
600 TABLET ORAL 3 TIMES DAILY
Qty: 90 TABLET | Refills: 5 | Status: SHIPPED | OUTPATIENT
Start: 2019-10-24 | End: 2019-12-05 | Stop reason: ALTCHOICE

## 2019-11-06 ENCOUNTER — TELEPHONE (OUTPATIENT)
Dept: ORTHOPEDIC SURGERY | Facility: CLINIC | Age: 79
End: 2019-11-06

## 2019-11-06 NOTE — TELEPHONE ENCOUNTER
Voicemail from patient stating she is having sciatica currently trouble sleeping pain is so bad can hardly stand it.

## 2019-11-07 NOTE — TELEPHONE ENCOUNTER
Call back to patient advised that the only thing we could do was get her back in for an appt. Advised that Dr. Drake was getting ready to go out of town so may be a little while before we can see her in the office. Okay per patient. Transferred to front for appt.

## 2019-12-05 ENCOUNTER — OFFICE VISIT (OUTPATIENT)
Dept: PAIN MEDICINE | Facility: CLINIC | Age: 79
End: 2019-12-05

## 2019-12-05 ENCOUNTER — RESULTS ENCOUNTER (OUTPATIENT)
Dept: PAIN MEDICINE | Facility: HOSPITAL | Age: 79
End: 2019-12-05

## 2019-12-05 VITALS
SYSTOLIC BLOOD PRESSURE: 138 MMHG | RESPIRATION RATE: 16 BRPM | BODY MASS INDEX: 26.12 KG/M2 | OXYGEN SATURATION: 97 % | HEART RATE: 70 BPM | TEMPERATURE: 97.6 F | HEIGHT: 64 IN | DIASTOLIC BLOOD PRESSURE: 75 MMHG | WEIGHT: 153 LBS

## 2019-12-05 DIAGNOSIS — M25.50 POLYARTHRALGIA: ICD-10-CM

## 2019-12-05 DIAGNOSIS — G89.4 CHRONIC PAIN SYNDROME: Primary | ICD-10-CM

## 2019-12-05 DIAGNOSIS — F19.90 CURRENT DRUG USE: ICD-10-CM

## 2019-12-05 DIAGNOSIS — M48.062 LUMBAR STENOSIS WITH NEUROGENIC CLAUDICATION: ICD-10-CM

## 2019-12-05 DIAGNOSIS — M96.1 POSTLAMINECTOMY SYNDROME OF LUMBAR REGION: ICD-10-CM

## 2019-12-05 DIAGNOSIS — F19.90 CURRENT DRUG USE: Primary | ICD-10-CM

## 2019-12-05 DIAGNOSIS — Z79.899 HIGH RISK MEDICATION USE: ICD-10-CM

## 2019-12-05 PROCEDURE — 99214 OFFICE O/P EST MOD 30 MIN: CPT | Performed by: ANESTHESIOLOGY

## 2019-12-05 PROCEDURE — G0463 HOSPITAL OUTPT CLINIC VISIT: HCPCS | Performed by: ANESTHESIOLOGY

## 2019-12-05 RX ORDER — HYDROCODONE BITARTRATE AND ACETAMINOPHEN 10; 325 MG/1; MG/1
1 TABLET ORAL 4 TIMES DAILY PRN
Qty: 120 TABLET | Refills: 0 | Status: ON HOLD | OUTPATIENT
Start: 2019-12-05 | End: 2020-01-07 | Stop reason: SDUPTHER

## 2019-12-05 RX ORDER — AMITRIPTYLINE HYDROCHLORIDE 50 MG/1
50 TABLET, FILM COATED ORAL
Refills: 0 | COMMUNITY
Start: 2019-12-04 | End: 2020-01-07 | Stop reason: HOSPADM

## 2019-12-05 RX ORDER — METOPROLOL SUCCINATE 50 MG/1
50 TABLET, EXTENDED RELEASE ORAL DAILY
COMMUNITY
End: 2020-11-30

## 2019-12-05 RX ORDER — HYDROCODONE BITARTRATE AND ACETAMINOPHEN 5; 325 MG/1; MG/1
TABLET ORAL
Refills: 0 | COMMUNITY
Start: 2019-12-04 | End: 2019-12-05

## 2019-12-05 RX ORDER — GABAPENTIN 600 MG/1
600 TABLET ORAL 3 TIMES DAILY
COMMUNITY
End: 2020-02-04 | Stop reason: SDUPTHER

## 2019-12-06 NOTE — PROGRESS NOTES
CC joints, back pain, jonah leg pain  79-year-old female with HTN, depression, chronic polyarthralgia from osteoarthritis, chronic back pain S/p L4-5 laminectomy with bony fusion 2018, Dr. Drake., here for follow-up.  Good relief with caudal STEPHAN last visit.  Had mechanical fall 2 weeks ago was seen at [facility]. With right facial ecchymosis.  Continued back pain bilateral lower extremity radicular pain and neurogenic claudication symptoms.  Seen orthospine considering another decompression.  Good relief of oxycodone however believes she did better on hydrocodone and would like to retry.     Chronic  low back pain and bilateral lower extremity pain worse with walking standing relieved by rest.  Denies saddle anesthesia, bladder or bowel incontinence.  Chronic polyarthralgia / left knee pain/ ilateral feet neuropathic pain    Still  limited in ADL due to bilateral lower extremity pain and weakness.  Ambulating with walker    Utilizes gabapentin,  oxycodone with good relief functional benefit without side effects.      L-Spine x-ray 2019 Degenerative changes of the lumbar spine with stable 6 mm anterolisthesis L4 upon L5  L-spine MRI 2019: postoperative changes are noted at L4 and L5 with laminectomies and posterior bony fusion. Degenerative changes are seen at multiple levels, greatest at L3-4, with moderate to severe spinal canal narrowing and moderate bilateral foraminal narrowing, greater on the left    C-spine CT 2017:  Degenerative changes spondylosis with central stenosis at C5-C6 and left foraminal stenosis C4/5    Pain Assessment   Location of Pain: Lower Back, R Hip, L Hip, R Leg, L Leg  Description of Pain: Dull/Aching, Throbbing, Stabbing  Previous Pain Rating : 8  Current Pain Ratin  Aggravating Factors: Activity  Alleviating Factors: Rest, Medication  Previous Treatments: Nerve Blocks/Injections, Epidural Steroids, Narcotic Pain Medication, Physical Therapy  Previous Diagnostic Studies: X-Ray,  "MRI    PEG Assessment   What number best describes your pain on average in the past week?8  What number best describes how, during the past week, pain has interfered with your enjoyment of life?5  What number best describes how, during the past week, pain has interfered with your general activity? 10     has a past medical history of Arthritis, Atrial fibrillation (CMS/HCC), Broken shoulder, Buttock pain, Depression, GERD (gastroesophageal reflux disease), H/O fall, Hip pain, Hypertension, Hypothyroidism, Leg pain, Low back pain, and Occipital bone fracture (CMS/HCC).     has a past surgical history that includes Hysterectomy; Rotator cuff repair (Left); Cholecystectomy; and Back surgery (07/19/2018).  .  Social History     Tobacco Use   • Smoking status: Never Smoker   • Smokeless tobacco: Never Used   Substance Use Topics   • Alcohol use: No       Review of Systems        See HPI    General       Denies fever/chills, fatigue and sleep problems.    Eyes       Denies blurry vision and double vision.    ENT       Denies decreased hearing, sore throat and ears ringing.    CV       Denies chest pain and fainting.    Resp       Denies shortness of breath and cough.    GI       Denies heartburn, constipation, nausea, vomiting and diarrhea.           Denies pain on urination, incontinence and increased frequency.    MS       Complains of joint swelling and cramps.       Denies weakness.       Right shoulder pain, low back pain, bilateral hip pain left knee.    Derm       Denies rash and itching.    Neuro       Denies numbness, tingling, loss of balance and history of seizures.    Psych       Denies anxiety and depression.    Endo       Denies weight change and thirsty all the time.    Heme       Denies easy bruising, bleeding and enlarged lymph nodes.    Vitals:    12/05/19 1426   BP: 138/75   Pulse: 70   Resp: 16   Temp: 97.6 °F (36.4 °C)   SpO2: 97%   Weight: 69.4 kg (153 lb)   Height: 162.6 cm (64\")     Physical Exam "   General  General appearance: well developed, well nourished, no acute distress  Gait and station: antalgic, walker    Mental Status Exam   Mental Status: AAO x3  Behavior: Appropriate    Cervical   ROM decreased w/ lateral rotation  Spurling's Test Negative  Palpation: Tender  Trapezius/LS  Comments: Right  paraspinal tenderness    Thoracolumbar   ROM: decreased rotation/extension  Straight Leg Raise: Negative  Palpation: Tender  Paravertebral    Muscle Stretch Reflex   Sensory Intact to light touch/pin prick    Neuro   Reflexes: R Arm 1+  L arm 1+  R Leg 1+  L Leg 1+    Strength: Arms Normal  Strength: Legs Normal     Eyes:      Clear conjunctivae,      Pupils rounds and reactive  ENT:      Clear oropharynx,       Mucosa moist/pink       Nose: no deformity  Chest Wall:      Non tender      No deformities or masses noted.    Respiratory:      Clear bilaterally to auscultation.        No dyspnea  Heart:      Regular rate and rhythm, no murmurs, rubs, or gallops   Pulses:      Pulses 2+, symmetric  Extremities:      No clubbing, cyanosis, edema, or deformity noted   Neurologic:      No focal deficits      Coordination intact with rapid alternating movement.  Skin:      Intact without lesions or rashes.  No mass  Cervical Nodes:      No adenopathy noted.    PHQ9 on chart                    opioid risk tool high risk        Assessment/Plan  Catalina was seen today for leg pain, hip pain and follow-up.    Diagnoses and all orders for this visit:    Chronic pain syndrome    Postlaminectomy syndrome of lumbar region    Lumbar stenosis with neurogenic claudication    Polyarthralgia    High risk medication use    Other orders  -     HYDROcodone-acetaminophen (NORCO)  MG per tablet; Take 1 tablet by mouth 4 (Four) Times a Day As Needed for Moderate Pain .    Summary:   79-year-old female with HTN, depression, chronic polyarthralgia from osteoarthritis, chronic back pain from DDD/ spondylosis/stenosis, S/p L4/5  laminectomy with bony fusion 7/2018, Dr. Drake seen in  follow-up.     Chronic lower lumbar /coccyx pain and RLE radicular pain, continued bilateral lower extremity weakness.    Good relief with caudal last visit    Hospitalized after a fall, records from [facility] reviewed, DX of mechemical fall.   There is questionable/concern for assault.  However patient denied.  States she  Is out of medication a few days early due to taking more on worse days.   Her niece is with her in the office today states that she will be organizing her medications from now on.    Patient still lives alone.  Has a son with history of drug addiction.  Concern for diversion.  Will monitor closely.  UDS sent today    Continue hydrocodone 10/25 4 times daily as needed for severe pain. UDS and Inspect reviewed.   Discussed risk of tolerance, dependence, respiratory depression, coma and death associated with use of oral opioids for treatment of chronic nonmalignant pain.    Continue flexeril    RTC  in  1 mo.

## 2019-12-07 ENCOUNTER — HOSPITAL ENCOUNTER (EMERGENCY)
Facility: HOSPITAL | Age: 79
Discharge: HOME | End: 2019-12-08
Attending: EMERGENCY MEDICINE | Admitting: EMERGENCY MEDICINE

## 2019-12-07 DIAGNOSIS — S09.90XA INJURY OF HEAD, INITIAL ENCOUNTER: Primary | ICD-10-CM

## 2019-12-07 DIAGNOSIS — S16.1XXA STRAIN OF NECK MUSCLE, INITIAL ENCOUNTER: ICD-10-CM

## 2019-12-07 DIAGNOSIS — S00.83XA CONTUSION OF FACE, INITIAL ENCOUNTER: ICD-10-CM

## 2019-12-07 PROCEDURE — 99284 EMERGENCY DEPT VISIT MOD MDM: CPT

## 2019-12-08 ENCOUNTER — APPOINTMENT (OUTPATIENT)
Dept: CT IMAGING | Facility: HOSPITAL | Age: 79
End: 2019-12-08

## 2019-12-08 VITALS
OXYGEN SATURATION: 100 % | TEMPERATURE: 98 F | BODY MASS INDEX: 26.12 KG/M2 | RESPIRATION RATE: 19 BRPM | SYSTOLIC BLOOD PRESSURE: 133 MMHG | HEIGHT: 64 IN | HEART RATE: 81 BPM | DIASTOLIC BLOOD PRESSURE: 94 MMHG | WEIGHT: 153 LBS

## 2019-12-08 PROCEDURE — 72125 CT NECK SPINE W/O DYE: CPT

## 2019-12-08 PROCEDURE — 70450 CT HEAD/BRAIN W/O DYE: CPT

## 2019-12-08 PROCEDURE — 70486 CT MAXILLOFACIAL W/O DYE: CPT

## 2019-12-08 RX ORDER — HYDROCODONE BITARTRATE AND ACETAMINOPHEN 5; 325 MG/1; MG/1
2 TABLET ORAL ONCE AS NEEDED
Status: COMPLETED | OUTPATIENT
Start: 2019-12-08 | End: 2019-12-08

## 2019-12-08 RX ORDER — GABAPENTIN 300 MG/1
600 CAPSULE ORAL ONCE
Status: COMPLETED | OUTPATIENT
Start: 2019-12-08 | End: 2019-12-08

## 2019-12-08 RX ADMIN — HYDROCODONE BITARTRATE AND ACETAMINOPHEN 2 TABLET: 5; 325 TABLET ORAL at 00:52

## 2019-12-08 RX ADMIN — GABAPENTIN 600 MG: 300 CAPSULE ORAL at 03:24

## 2019-12-08 NOTE — ED PROVIDER NOTES
Subjective   79-year-old female with mechanical fall, reports tripping in bathroom and falling on the ground.  Patient struck her head and was dazed.  Patient complains of headache, facial pain, neck pain, moderate, worse with movement.  Patient is managed on anticoagulation, Eliquis.  Patient denies any other injury.  Symptoms are moderate.          Review of Systems   Musculoskeletal: Positive for neck pain.   Neurological: Positive for headaches.   All other systems reviewed and are negative.      Past Medical History:   Diagnosis Date   • Arthritis    • Atrial fibrillation (CMS/HCC)    • Broken shoulder     left-- due to fall 11-7-19 was at [facility]   • Buttock pain     rt side   • Depression    • GERD (gastroesophageal reflux disease)    • H/O fall    • Hip pain     rt   • Hypertension    • Hypothyroidism    • Leg pain     rt   • Low back pain    • Occipital bone fracture (CMS/HCC)     left- due to fall   11-7-19 was inpt at [facility]       Allergies   Allergen Reactions   • Baclofen Rash   • Codeine Nausea Only   • Ibuprofen Unknown (See Comments)     Patient doesn't know----Motin   • Methocarbamol Unknown (See Comments)     Patient doesn't know   • Naproxen Unknown (See Comments)     Pt. Doesn't know    • Tizanidine Hcl Hallucinations   • Tolmetin Unknown (See Comments)     Pt. Doesn't know-- same as Tolectin       Past Surgical History:   Procedure Laterality Date   • BACK SURGERY  07/19/2018    PDC &  PSF L3-L5 insitu   • CHOLECYSTECTOMY     • HYSTERECTOMY     • ROTATOR CUFF REPAIR Left        Family History   Problem Relation Age of Onset   • Cancer Father    • Diabetes Son    • Cancer Paternal Aunt    • Heart disease Paternal Aunt    • Cancer Paternal Uncle        Social History     Socioeconomic History   • Marital status:      Spouse name: Not on file   • Number of children: Not on file   • Years of education: Not on file   • Highest education level: Not on file   Tobacco Use   • Smoking status: Never  Smoker   • Smokeless tobacco: Never Used   Substance and Sexual Activity   • Alcohol use: No   • Drug use: No   • Sexual activity: Defer           Objective   Physical Exam   Constitutional: She is oriented to person, place, and time. She appears well-developed and well-nourished.   HENT:   Mouth/Throat: Oropharynx is clear and moist.   Left maxillary and periorbital swelling and tenderness to palpation   Eyes: Pupils are equal, round, and reactive to light. Conjunctivae and EOM are normal.   Neck:   Cervical spine diffusely tender to palpation, no step-off.   Cardiovascular: Normal rate, regular rhythm, normal heart sounds and intact distal pulses.   Pulmonary/Chest: Effort normal and breath sounds normal.   Abdominal: Soft. Bowel sounds are normal. She exhibits no distension. There is no tenderness.   Musculoskeletal:   Full range of motion all 4 extremities without pain or tenderness to palpation, thoracic and lumbar spines nontender   Neurological: She is alert and oriented to person, place, and time. No cranial nerve deficit.   Motor and sensation intact   Skin: Skin is warm and dry. Capillary refill takes less than 2 seconds.   Psychiatric: She has a normal mood and affect. Her behavior is normal.       Procedures           ED Course                      No data recorded                        MDM  Number of Diagnoses or Management Options  Contusion of face, initial encounter:   Injury of head, initial encounter:   Strain of neck muscle, initial encounter:   Diagnosis management comments: Ct Head Without Contrast    Result Date: 12/7/2019  1. No acute intracranial process. Left periorbital and left facial soft tissue swelling/hematoma. 2. Mild changes small vessel ischemic disease of indeterminate age, presumably mostly chronic. Volume loss. Atherosclerosis.  Electronically signed by:  Evangelista Dutta M.D.  12/7/2019 10:28 PM    Ct Cervical Spine Without Contrast    Result Date: 12/7/2019  1. No acute  osseous abnormality. MRI is more sensitive to detect any occult or ligamentous injury. Multilevel degenerative changes.  Electronically signed by:  Evangelista Dutta M.D.  12/7/2019 11:59 PM    Ct Facial Bones Without Contrast    Result Date: 12/7/2019   1. No acute osseous abnormality. Limited by motion. Left periorbital and left facial soft tissue swelling/hematoma.  Electronically signed by:  Evangelista Dutta M.D.  12/7/2019 11:56 PM    Patient appears well, tells me her legs feel restless which she takes Neurontin for, will give her medicine.  Blood pressure 140/80.    Patient lucid and appropriate       Amount and/or Complexity of Data Reviewed  Tests in the radiology section of CPT®: reviewed        Final diagnoses:   Injury of head, initial encounter   Strain of neck muscle, initial encounter   Contusion of face, initial encounter              WarrenMark izaguirre MD  12/08/19 0301

## 2019-12-08 NOTE — ED NOTES
Third attempt to get ahold of son and grand daughter for . Patient lives in [place] and unable to get taxi voucher.      Lexi Mares, RN  12/08/19 [id number]

## 2019-12-08 NOTE — ED NOTES
Attempted to call son and grandaughter again left message no response.      Lexi Mares, RN  12/08/19 0596

## 2019-12-08 NOTE — ED NOTES
Pt reports trip and fall this evening, c/o back and neck pain. C-Spine immobilization maintained      Demetrice Thompson RN  12/07/19 7532

## 2019-12-08 NOTE — ED NOTES
Granddaughter and son called left message for both trying to get a ride home.     Lexi Mares, RN  12/08/19 [id number]

## 2019-12-08 NOTE — ED NOTES
Pt.was medicated with PO pain medication. CT attempted to take patient to see she can have remaining CT done.      Lexi Mares, RN  12/08/19 0114

## 2019-12-08 NOTE — PROGRESS NOTES
Case Management/Social Work    Patient Name:  Catalina Moreno  YOB: 1940  MRN: [id number]  Admit Date:  12/7/2019        Pt arrived via EMS. Primary RN made multiple attempts to contact emergency contact listed for transportation home after ED d/c. Multiple messages left for pt's son and granddaughter. CM entered room to provide cab voucher. Pt unsure if able to enter home upon arrival. She left all belongings at home. During CM interview, granddaughter returned call and is en route to hospital to take pt home.     Once granddaughter arrived, she was requesting information for elder help. She states that the pt has fallen multiple times, and she is concerned that the pt is not taking medications as prescribed despite family arranging medication in pill calendar. Information given on Elder Advisor as pt's granddaughter states that the pt will not discuss finances with family members. Also provided resources for assisted living facilities and medical alert devices.       Electronically signed by:  Nicole Graham RN  12/08/19 8:51 AM

## 2019-12-13 ENCOUNTER — TELEPHONE (OUTPATIENT)
Dept: ORTHOPEDIC SURGERY | Facility: CLINIC | Age: 79
End: 2019-12-13

## 2019-12-13 NOTE — TELEPHONE ENCOUNTER
PREVIOUS PATIENT OF DR. RAHMAN. PATIENT BEING REFERRED BACK BY CATALINO KING NP FOR LOW BACK PAIN. LEFT MESSAGE FOR PATIENT REGARDING REFERRAL TO CALL OUR OFFICE TO MAKE AN APPOINTMENT.

## 2019-12-19 ENCOUNTER — TELEPHONE (OUTPATIENT)
Dept: PAIN MEDICINE | Facility: CLINIC | Age: 79
End: 2019-12-19

## 2019-12-19 NOTE — TELEPHONE ENCOUNTER
Contacted patient for a pill count today she stated she was unable to come because she can not drive and has no one to bring her in.

## 2019-12-19 NOTE — TELEPHONE ENCOUNTER
Patient contacted and she will check to see if she can find someone that can get her to a pharmacy or here for pill count. Patient was notified that she needed to come in or to a pharmacy for a correct count in order to continue to receive medication.

## 2019-12-19 NOTE — TELEPHONE ENCOUNTER
Jaki Thompson called and said she is the person who sets up Catalina's medication and she has the medication hid and is unable to get it here or to a pharmacy by close today because her son is having surgery. She has been controlling the patients medication since her last visit because the patient was abusing the medication.

## 2020-01-03 ENCOUNTER — HOSPITAL ENCOUNTER (INPATIENT)
Facility: HOSPITAL | Age: 80
LOS: 3 days | Discharge: HOME HEALTH | End: 2020-01-07
Attending: EMERGENCY MEDICINE | Admitting: INTERNAL MEDICINE

## 2020-01-03 ENCOUNTER — TELEPHONE (OUTPATIENT)
Dept: PAIN MEDICINE | Facility: HOSPITAL | Age: 80
End: 2020-01-03

## 2020-01-03 ENCOUNTER — APPOINTMENT (OUTPATIENT)
Dept: GENERAL RADIOLOGY | Facility: HOSPITAL | Age: 80
End: 2020-01-03

## 2020-01-03 DIAGNOSIS — A41.9 SEVERE SEPSIS (HCC): ICD-10-CM

## 2020-01-03 DIAGNOSIS — R65.20 SEVERE SEPSIS (HCC): ICD-10-CM

## 2020-01-03 DIAGNOSIS — R41.82 ALTERED MENTAL STATUS, UNSPECIFIED ALTERED MENTAL STATUS TYPE: Primary | ICD-10-CM

## 2020-01-03 LAB
ALBUMIN SERPL-MCNC: 5 G/DL (ref 3.5–5.2)
ALP SERPL-CCNC: 101 U/L (ref 39–117)
ALT SERPL W P-5'-P-CCNC: 15 U/L (ref 1–33)
ANION GAP SERPL CALCULATED.3IONS-SCNC: 20 MMOL/L (ref 5–15)
APAP SERPL-MCNC: <5 MCG/ML (ref 10–30)
APTT PPP: 26.6 SECONDS (ref 24–31)
ARTERIAL PATENCY WRIST A: POSITIVE
AST SERPL-CCNC: 41 U/L (ref 1–32)
ATMOSPHERIC PRESS: ABNORMAL MM[HG]
BASE EXCESS BLDA CALC-SCNC: 3.2 MMOL/L (ref 0–3)
BASOPHILS # BLD AUTO: 0.1 10*3/MM3 (ref 0–0.2)
BASOPHILS NFR BLD AUTO: 0.9 % (ref 0–1.5)
BDY SITE: ABNORMAL
BILIRUB CONJ SERPL-MCNC: 0.2 MG/DL (ref 0.2–0.3)
BILIRUB INDIRECT SERPL-MCNC: 0.7 MG/DL
BILIRUB SERPL-MCNC: 0.9 MG/DL (ref 0.2–1.2)
BUN BLD-MCNC: 30 MG/DL (ref 8–23)
BUN/CREAT SERPL: 28.3 (ref 7–25)
CALCIUM SPEC-SCNC: 10 MG/DL (ref 8.6–10.5)
CHLORIDE SERPL-SCNC: 93 MMOL/L (ref 98–107)
CK SERPL-CCNC: 135 U/L (ref 20–180)
CO2 BLDA-SCNC: 26.4 MMOL/L (ref 22–29)
CO2 SERPL-SCNC: 26 MMOL/L (ref 22–29)
CREAT BLD-MCNC: 1.06 MG/DL (ref 0.57–1)
D-LACTATE SERPL-SCNC: 2.4 MMOL/L (ref 0.5–2)
DEPRECATED RDW RBC AUTO: 47.3 FL (ref 37–54)
EOSINOPHIL # BLD AUTO: 0 10*3/MM3 (ref 0–0.4)
EOSINOPHIL NFR BLD AUTO: 0 % (ref 0.3–6.2)
ERYTHROCYTE [DISTWIDTH] IN BLOOD BY AUTOMATED COUNT: 14.8 % (ref 12.3–15.4)
GFR SERPL CREATININE-BSD FRML MDRD: 50 ML/MIN/1.73
GLUCOSE BLD-MCNC: 150 MG/DL (ref 65–99)
HCO3 BLDA-SCNC: 25.5 MMOL/L (ref 21–28)
HCT VFR BLD AUTO: 35.1 % (ref 34–46.6)
HEMODILUTION: NO
HGB BLD-MCNC: 11.7 G/DL (ref 12–15.9)
HOROWITZ INDEX BLD+IHG-RTO: 21 %
INR PPP: 1.18 (ref 0.9–1.1)
LYMPHOCYTES # BLD AUTO: 1.2 10*3/MM3 (ref 0.7–3.1)
LYMPHOCYTES NFR BLD AUTO: 19.1 % (ref 19.6–45.3)
MCH RBC QN AUTO: 30 PG (ref 26.6–33)
MCHC RBC AUTO-ENTMCNC: 33.4 G/DL (ref 31.5–35.7)
MCV RBC AUTO: 89.6 FL (ref 79–97)
MODALITY: ABNORMAL
MONOCYTES # BLD AUTO: 0.1 10*3/MM3 (ref 0.1–0.9)
MONOCYTES NFR BLD AUTO: 1.4 % (ref 5–12)
NEUTROPHILS # BLD AUTO: 5 10*3/MM3 (ref 1.7–7)
NEUTROPHILS NFR BLD AUTO: 78.6 % (ref 42.7–76)
NRBC BLD AUTO-RTO: 0 /100 WBC (ref 0–0.2)
PCO2 BLDA: 31 MM HG (ref 35–48)
PH BLDA: 7.52 PH UNITS (ref 7.35–7.45)
PLATELET # BLD AUTO: 364 10*3/MM3 (ref 140–450)
PMV BLD AUTO: 7.5 FL (ref 6–12)
PO2 BLDA: 69.1 MM HG (ref 83–108)
POTASSIUM BLD-SCNC: 3.6 MMOL/L (ref 3.5–5.2)
PROT SERPL-MCNC: 9.5 G/DL (ref 6–8.5)
PROTHROMBIN TIME: 12 SECONDS (ref 9.6–11.7)
RBC # BLD AUTO: 3.92 10*6/MM3 (ref 3.77–5.28)
SALICYLATES SERPL-MCNC: <0.3 MG/DL
SAO2 % BLDCOA: 95.6 % (ref 94–98)
SODIUM BLD-SCNC: 139 MMOL/L (ref 136–145)
TROPONIN T SERPL-MCNC: <0.01 NG/ML (ref 0–0.03)
WBC NRBC COR # BLD: 6.3 10*3/MM3 (ref 3.4–10.8)

## 2020-01-03 PROCEDURE — 80307 DRUG TEST PRSMV CHEM ANLYZR: CPT | Performed by: EMERGENCY MEDICINE

## 2020-01-03 PROCEDURE — 82140 ASSAY OF AMMONIA: CPT | Performed by: EMERGENCY MEDICINE

## 2020-01-03 PROCEDURE — 85025 COMPLETE CBC W/AUTO DIFF WBC: CPT | Performed by: NURSE PRACTITIONER

## 2020-01-03 PROCEDURE — 99285 EMERGENCY DEPT VISIT HI MDM: CPT

## 2020-01-03 PROCEDURE — 36600 WITHDRAWAL OF ARTERIAL BLOOD: CPT

## 2020-01-03 PROCEDURE — 80076 HEPATIC FUNCTION PANEL: CPT | Performed by: EMERGENCY MEDICINE

## 2020-01-03 PROCEDURE — P9612 CATHETERIZE FOR URINE SPEC: HCPCS

## 2020-01-03 PROCEDURE — 83605 ASSAY OF LACTIC ACID: CPT

## 2020-01-03 PROCEDURE — 85610 PROTHROMBIN TIME: CPT | Performed by: EMERGENCY MEDICINE

## 2020-01-03 PROCEDURE — 80048 BASIC METABOLIC PNL TOTAL CA: CPT | Performed by: NURSE PRACTITIONER

## 2020-01-03 PROCEDURE — 87040 BLOOD CULTURE FOR BACTERIA: CPT | Performed by: EMERGENCY MEDICINE

## 2020-01-03 PROCEDURE — 82550 ASSAY OF CK (CPK): CPT | Performed by: EMERGENCY MEDICINE

## 2020-01-03 PROCEDURE — 84484 ASSAY OF TROPONIN QUANT: CPT | Performed by: EMERGENCY MEDICINE

## 2020-01-03 PROCEDURE — 25010000002 LORAZEPAM PER 2 MG: Performed by: NURSE PRACTITIONER

## 2020-01-03 PROCEDURE — 71045 X-RAY EXAM CHEST 1 VIEW: CPT

## 2020-01-03 PROCEDURE — 93005 ELECTROCARDIOGRAM TRACING: CPT | Performed by: EMERGENCY MEDICINE

## 2020-01-03 PROCEDURE — 82803 BLOOD GASES ANY COMBINATION: CPT

## 2020-01-03 PROCEDURE — 25010000002 ZIPRASIDONE MESYLATE PER 10 MG: Performed by: EMERGENCY MEDICINE

## 2020-01-03 PROCEDURE — 85730 THROMBOPLASTIN TIME PARTIAL: CPT | Performed by: EMERGENCY MEDICINE

## 2020-01-03 PROCEDURE — 36415 COLL VENOUS BLD VENIPUNCTURE: CPT

## 2020-01-03 RX ORDER — LORAZEPAM 2 MG/ML
1 INJECTION INTRAMUSCULAR ONCE
Status: COMPLETED | OUTPATIENT
Start: 2020-01-03 | End: 2020-01-03

## 2020-01-03 RX ORDER — VANCOMYCIN HYDROCHLORIDE
1500 ONCE
Status: COMPLETED | OUTPATIENT
Start: 2020-01-03 | End: 2020-01-04

## 2020-01-03 RX ORDER — WATER 1000 ML/1000ML
INJECTION, SOLUTION INTRAVENOUS
Status: COMPLETED
Start: 2020-01-03 | End: 2020-01-04

## 2020-01-03 RX ORDER — SODIUM CHLORIDE 0.9 % (FLUSH) 0.9 %
10 SYRINGE (ML) INJECTION AS NEEDED
Status: DISCONTINUED | OUTPATIENT
Start: 2020-01-03 | End: 2020-01-07 | Stop reason: HOSPADM

## 2020-01-03 RX ORDER — ACETAMINOPHEN 650 MG/1
650 SUPPOSITORY RECTAL ONCE
Status: COMPLETED | OUTPATIENT
Start: 2020-01-03 | End: 2020-01-03

## 2020-01-03 RX ORDER — ZIPRASIDONE MESYLATE 20 MG/ML
10 INJECTION, POWDER, LYOPHILIZED, FOR SOLUTION INTRAMUSCULAR ONCE
Status: COMPLETED | OUTPATIENT
Start: 2020-01-03 | End: 2020-01-03

## 2020-01-03 RX ORDER — CEPHALEXIN 500 MG/1
500 CAPSULE ORAL 3 TIMES DAILY
COMMUNITY
Start: 2020-01-03 | End: 2020-01-13

## 2020-01-03 RX ADMIN — LORAZEPAM 1 MG: 2 INJECTION INTRAMUSCULAR; INTRAVENOUS at 21:13

## 2020-01-03 RX ADMIN — SODIUM CHLORIDE 1000 ML: 900 INJECTION, SOLUTION INTRAVENOUS at 23:18

## 2020-01-03 RX ADMIN — ACETAMINOPHEN 650 MG: 650 SUPPOSITORY RECTAL at 23:18

## 2020-01-03 RX ADMIN — ZIPRASIDONE MESYLATE 10 MG: 20 INJECTION, POWDER, LYOPHILIZED, FOR SOLUTION INTRAMUSCULAR at 23:18

## 2020-01-04 ENCOUNTER — APPOINTMENT (OUTPATIENT)
Dept: CT IMAGING | Facility: HOSPITAL | Age: 80
End: 2020-01-04

## 2020-01-04 PROBLEM — A41.9 SEPSIS: Status: ACTIVE | Noted: 2020-01-04

## 2020-01-04 PROBLEM — R41.82 ALTERED MENTAL STATUS: Status: ACTIVE | Noted: 2020-01-04

## 2020-01-04 LAB
ALBUMIN SERPL-MCNC: 4 G/DL (ref 3.5–5.2)
ALBUMIN/GLOB SERPL: 1 G/DL
ALP SERPL-CCNC: 79 U/L (ref 39–117)
ALT SERPL W P-5'-P-CCNC: 13 U/L (ref 1–33)
AMMONIA BLD-SCNC: 28 UMOL/L (ref 11–51)
AMMONIA BLD-SCNC: 54 UMOL/L (ref 11–51)
AMPHET+METHAMPHET UR QL: NEGATIVE
ANION GAP SERPL CALCULATED.3IONS-SCNC: 13 MMOL/L (ref 5–15)
AST SERPL-CCNC: 33 U/L (ref 1–32)
B PERT DNA SPEC QL NAA+PROBE: NOT DETECTED
BARBITURATES UR QL SCN: NEGATIVE
BASOPHILS # BLD AUTO: 0 10*3/MM3 (ref 0–0.2)
BASOPHILS # BLD AUTO: 0 10*3/MM3 (ref 0–0.2)
BASOPHILS NFR BLD AUTO: 0.3 % (ref 0–1.5)
BASOPHILS NFR BLD AUTO: 0.4 % (ref 0–1.5)
BENZODIAZ UR QL SCN: NEGATIVE
BILIRUB SERPL-MCNC: 0.9 MG/DL (ref 0.2–1.2)
BILIRUB UR QL STRIP: NEGATIVE
BUN BLD-MCNC: 32 MG/DL (ref 8–23)
BUN/CREAT SERPL: 34.4 (ref 7–25)
C PNEUM DNA NPH QL NAA+NON-PROBE: NOT DETECTED
CALCIUM SPEC-SCNC: 9 MG/DL (ref 8.6–10.5)
CANNABINOIDS SERPL QL: NEGATIVE
CHLORIDE SERPL-SCNC: 106 MMOL/L (ref 98–107)
CLARITY UR: CLEAR
CO2 SERPL-SCNC: 26 MMOL/L (ref 22–29)
COCAINE UR QL: NEGATIVE
COLOR UR: YELLOW
CREAT BLD-MCNC: 0.93 MG/DL (ref 0.57–1)
CRP SERPL-MCNC: 0.63 MG/DL (ref 0–0.5)
D-LACTATE SERPL-SCNC: 1.1 MMOL/L (ref 0.5–2)
DEPRECATED RDW RBC AUTO: 49.9 FL (ref 37–54)
DEPRECATED RDW RBC AUTO: 50.3 FL (ref 37–54)
EOSINOPHIL # BLD AUTO: 0 10*3/MM3 (ref 0–0.4)
EOSINOPHIL # BLD AUTO: 0 10*3/MM3 (ref 0–0.4)
EOSINOPHIL NFR BLD AUTO: 0 % (ref 0.3–6.2)
EOSINOPHIL NFR BLD AUTO: 0.1 % (ref 0.3–6.2)
ERYTHROCYTE [DISTWIDTH] IN BLOOD BY AUTOMATED COUNT: 15.5 % (ref 12.3–15.4)
ERYTHROCYTE [DISTWIDTH] IN BLOOD BY AUTOMATED COUNT: 15.6 % (ref 12.3–15.4)
ERYTHROCYTE [SEDIMENTATION RATE] IN BLOOD: 36 MM/HR (ref 0–30)
FLUAV H1 2009 PAND RNA NPH QL NAA+PROBE: NOT DETECTED
FLUAV H1 HA GENE NPH QL NAA+PROBE: NOT DETECTED
FLUAV H3 RNA NPH QL NAA+PROBE: NOT DETECTED
FLUAV SUBTYP SPEC NAA+PROBE: NOT DETECTED
FLUBV RNA ISLT QL NAA+PROBE: NOT DETECTED
GFR SERPL CREATININE-BSD FRML MDRD: 58 ML/MIN/1.73
GLOBULIN UR ELPH-MCNC: 4 GM/DL
GLUCOSE BLD-MCNC: 116 MG/DL (ref 65–99)
GLUCOSE UR STRIP-MCNC: NEGATIVE MG/DL
HADV DNA SPEC NAA+PROBE: NOT DETECTED
HCOV 229E RNA SPEC QL NAA+PROBE: NOT DETECTED
HCOV HKU1 RNA SPEC QL NAA+PROBE: NOT DETECTED
HCOV NL63 RNA SPEC QL NAA+PROBE: NOT DETECTED
HCOV OC43 RNA SPEC QL NAA+PROBE: NOT DETECTED
HCT VFR BLD AUTO: 31.1 % (ref 34–46.6)
HCT VFR BLD AUTO: 31.7 % (ref 34–46.6)
HGB BLD-MCNC: 10.3 G/DL (ref 12–15.9)
HGB BLD-MCNC: 10.3 G/DL (ref 12–15.9)
HGB UR QL STRIP.AUTO: NEGATIVE
HMPV RNA NPH QL NAA+NON-PROBE: NOT DETECTED
HOLD SPECIMEN: NORMAL
HOLD SPECIMEN: NORMAL
HPIV1 RNA SPEC QL NAA+PROBE: NOT DETECTED
HPIV2 RNA SPEC QL NAA+PROBE: NOT DETECTED
HPIV3 RNA NPH QL NAA+PROBE: NOT DETECTED
HPIV4 P GENE NPH QL NAA+PROBE: NOT DETECTED
KETONES UR QL STRIP: NEGATIVE
LEUKOCYTE ESTERASE UR QL STRIP.AUTO: NEGATIVE
LYMPHOCYTES # BLD AUTO: 1.3 10*3/MM3 (ref 0.7–3.1)
LYMPHOCYTES # BLD AUTO: 1.7 10*3/MM3 (ref 0.7–3.1)
LYMPHOCYTES NFR BLD AUTO: 13.3 % (ref 19.6–45.3)
LYMPHOCYTES NFR BLD AUTO: 15.3 % (ref 19.6–45.3)
M PNEUMO IGG SER IA-ACNC: NOT DETECTED
MCH RBC QN AUTO: 29.4 PG (ref 26.6–33)
MCH RBC QN AUTO: 30.1 PG (ref 26.6–33)
MCHC RBC AUTO-ENTMCNC: 32.4 G/DL (ref 31.5–35.7)
MCHC RBC AUTO-ENTMCNC: 33 G/DL (ref 31.5–35.7)
MCV RBC AUTO: 90.6 FL (ref 79–97)
MCV RBC AUTO: 91.1 FL (ref 79–97)
METHADONE UR QL SCN: NEGATIVE
MONOCYTES # BLD AUTO: 0.9 10*3/MM3 (ref 0.1–0.9)
MONOCYTES # BLD AUTO: 1.3 10*3/MM3 (ref 0.1–0.9)
MONOCYTES NFR BLD AUTO: 11.5 % (ref 5–12)
MONOCYTES NFR BLD AUTO: 9.3 % (ref 5–12)
NEUTROPHILS # BLD AUTO: 7.6 10*3/MM3 (ref 1.7–7)
NEUTROPHILS # BLD AUTO: 8 10*3/MM3 (ref 1.7–7)
NEUTROPHILS NFR BLD AUTO: 72.8 % (ref 42.7–76)
NEUTROPHILS NFR BLD AUTO: 77 % (ref 42.7–76)
NITRITE UR QL STRIP: NEGATIVE
NRBC BLD AUTO-RTO: 0 /100 WBC (ref 0–0.2)
NRBC BLD AUTO-RTO: 0.1 /100 WBC (ref 0–0.2)
NT-PROBNP SERPL-MCNC: 5704 PG/ML (ref 5–1800)
OPIATES UR QL: POSITIVE
OXYCODONE UR QL SCN: NEGATIVE
PH UR STRIP.AUTO: 6.5 [PH] (ref 5–8)
PLATELET # BLD AUTO: 284 10*3/MM3 (ref 140–450)
PLATELET # BLD AUTO: 286 10*3/MM3 (ref 140–450)
PMV BLD AUTO: 7.3 FL (ref 6–12)
PMV BLD AUTO: 7.6 FL (ref 6–12)
POTASSIUM BLD-SCNC: 3.1 MMOL/L (ref 3.5–5.2)
PROCALCITONIN SERPL-MCNC: 0.06 NG/ML (ref 0.1–0.25)
PROT SERPL-MCNC: 8 G/DL (ref 6–8.5)
PROT UR QL STRIP: ABNORMAL
RBC # BLD AUTO: 3.41 10*6/MM3 (ref 3.77–5.28)
RBC # BLD AUTO: 3.5 10*6/MM3 (ref 3.77–5.28)
RHINOVIRUS RNA SPEC NAA+PROBE: NOT DETECTED
RSV RNA NPH QL NAA+NON-PROBE: NOT DETECTED
SODIUM BLD-SCNC: 145 MMOL/L (ref 136–145)
SP GR UR STRIP: 1.01 (ref 1–1.03)
TSH SERPL DL<=0.05 MIU/L-ACNC: 1.37 UIU/ML (ref 0.27–4.2)
UROBILINOGEN UR QL STRIP: ABNORMAL
VIT B12 BLD-MCNC: 449 PG/ML (ref 211–946)
WBC NRBC COR # BLD: 10.9 10*3/MM3 (ref 3.4–10.8)
WBC NRBC COR # BLD: 9.9 10*3/MM3 (ref 3.4–10.8)

## 2020-01-04 PROCEDURE — G0378 HOSPITAL OBSERVATION PER HR: HCPCS

## 2020-01-04 PROCEDURE — 80053 COMPREHEN METABOLIC PANEL: CPT | Performed by: NURSE PRACTITIONER

## 2020-01-04 PROCEDURE — 25010000002 VANCOMYCIN 10 G RECONSTITUTED SOLUTION: Performed by: EMERGENCY MEDICINE

## 2020-01-04 PROCEDURE — 80307 DRUG TEST PRSMV CHEM ANLYZR: CPT | Performed by: EMERGENCY MEDICINE

## 2020-01-04 PROCEDURE — 0099U HC BIOFIRE FILMARRAY RESP PANEL 1: CPT | Performed by: EMERGENCY MEDICINE

## 2020-01-04 PROCEDURE — 25010000002 CEFEPIME PER 500 MG: Performed by: INTERNAL MEDICINE

## 2020-01-04 PROCEDURE — 86140 C-REACTIVE PROTEIN: CPT | Performed by: INTERNAL MEDICINE

## 2020-01-04 PROCEDURE — 70450 CT HEAD/BRAIN W/O DYE: CPT

## 2020-01-04 PROCEDURE — 85025 COMPLETE CBC W/AUTO DIFF WBC: CPT | Performed by: INTERNAL MEDICINE

## 2020-01-04 PROCEDURE — 25010000002 CEFEPIME PER 500 MG: Performed by: NURSE PRACTITIONER

## 2020-01-04 PROCEDURE — 81003 URINALYSIS AUTO W/O SCOPE: CPT | Performed by: EMERGENCY MEDICINE

## 2020-01-04 PROCEDURE — 83605 ASSAY OF LACTIC ACID: CPT

## 2020-01-04 PROCEDURE — 25010000002 CEFEPIME PER 500 MG: Performed by: EMERGENCY MEDICINE

## 2020-01-04 PROCEDURE — 82140 ASSAY OF AMMONIA: CPT | Performed by: INTERNAL MEDICINE

## 2020-01-04 PROCEDURE — 82607 VITAMIN B-12: CPT | Performed by: INTERNAL MEDICINE

## 2020-01-04 PROCEDURE — 85652 RBC SED RATE AUTOMATED: CPT | Performed by: INTERNAL MEDICINE

## 2020-01-04 PROCEDURE — 87040 BLOOD CULTURE FOR BACTERIA: CPT | Performed by: EMERGENCY MEDICINE

## 2020-01-04 PROCEDURE — 85025 COMPLETE CBC W/AUTO DIFF WBC: CPT | Performed by: NURSE PRACTITIONER

## 2020-01-04 PROCEDURE — 84145 PROCALCITONIN (PCT): CPT | Performed by: INTERNAL MEDICINE

## 2020-01-04 PROCEDURE — 84443 ASSAY THYROID STIM HORMONE: CPT | Performed by: INTERNAL MEDICINE

## 2020-01-04 PROCEDURE — 83880 ASSAY OF NATRIURETIC PEPTIDE: CPT | Performed by: INTERNAL MEDICINE

## 2020-01-04 PROCEDURE — 25010000002 VANCOMYCIN 1 G RECONSTITUTED SOLUTION 1 EACH VIAL: Performed by: EMERGENCY MEDICINE

## 2020-01-04 PROCEDURE — 99223 1ST HOSP IP/OBS HIGH 75: CPT | Performed by: INTERNAL MEDICINE

## 2020-01-04 PROCEDURE — 25010000002 ZIPRASIDONE MESYLATE PER 10 MG: Performed by: EMERGENCY MEDICINE

## 2020-01-04 RX ORDER — SODIUM CHLORIDE 9 MG/ML
125 INJECTION, SOLUTION INTRAVENOUS CONTINUOUS
Status: DISCONTINUED | OUTPATIENT
Start: 2020-01-04 | End: 2020-01-05

## 2020-01-04 RX ORDER — IPRATROPIUM BROMIDE AND ALBUTEROL SULFATE 2.5; .5 MG/3ML; MG/3ML
3 SOLUTION RESPIRATORY (INHALATION) EVERY 4 HOURS PRN
Status: DISCONTINUED | OUTPATIENT
Start: 2020-01-04 | End: 2020-01-07 | Stop reason: HOSPADM

## 2020-01-04 RX ORDER — POTASSIUM CHLORIDE 1.5 G/1.77G
40 POWDER, FOR SOLUTION ORAL AS NEEDED
Status: DISCONTINUED | OUTPATIENT
Start: 2020-01-04 | End: 2020-01-07 | Stop reason: HOSPADM

## 2020-01-04 RX ORDER — POTASSIUM CHLORIDE 20 MEQ/1
40 TABLET, EXTENDED RELEASE ORAL AS NEEDED
Status: DISCONTINUED | OUTPATIENT
Start: 2020-01-04 | End: 2020-01-07 | Stop reason: HOSPADM

## 2020-01-04 RX ORDER — POTASSIUM CHLORIDE 7.45 MG/ML
10 INJECTION INTRAVENOUS
Status: DISCONTINUED | OUTPATIENT
Start: 2020-01-04 | End: 2020-01-07 | Stop reason: HOSPADM

## 2020-01-04 RX ORDER — LACTULOSE 10 G/15ML
30 SOLUTION ORAL DAILY
Status: DISCONTINUED | OUTPATIENT
Start: 2020-01-04 | End: 2020-01-07 | Stop reason: HOSPADM

## 2020-01-04 RX ORDER — HYDROCODONE BITARTRATE AND ACETAMINOPHEN 5; 325 MG/1; MG/1
1 TABLET ORAL EVERY 6 HOURS PRN
Status: DISCONTINUED | OUTPATIENT
Start: 2020-01-04 | End: 2020-01-07 | Stop reason: HOSPADM

## 2020-01-04 RX ORDER — ZIPRASIDONE MESYLATE 20 MG/ML
10 INJECTION, POWDER, LYOPHILIZED, FOR SOLUTION INTRAMUSCULAR ONCE
Status: COMPLETED | OUTPATIENT
Start: 2020-01-04 | End: 2020-01-04

## 2020-01-04 RX ADMIN — SODIUM CHLORIDE 1000 MG: 900 INJECTION, SOLUTION INTRAVENOUS at 22:38

## 2020-01-04 RX ADMIN — HYDROCODONE BITARTRATE AND ACETAMINOPHEN 1 TABLET: 5; 325 TABLET ORAL at 23:54

## 2020-01-04 RX ADMIN — ZIPRASIDONE MESYLATE 10 MG: 20 INJECTION, POWDER, LYOPHILIZED, FOR SOLUTION INTRAMUSCULAR at 00:36

## 2020-01-04 RX ADMIN — HYDROCODONE BITARTRATE AND ACETAMINOPHEN 1 TABLET: 5; 325 TABLET ORAL at 18:22

## 2020-01-04 RX ADMIN — CEFEPIME HYDROCHLORIDE 2 G: 2 INJECTION, POWDER, FOR SOLUTION INTRAVENOUS at 18:22

## 2020-01-04 RX ADMIN — WATER 1 ML: 1 INJECTION INTRAMUSCULAR; INTRAVENOUS; SUBCUTANEOUS at 02:36

## 2020-01-04 RX ADMIN — CEFEPIME HYDROCHLORIDE 2 G: 2 INJECTION, POWDER, FOR SOLUTION INTRAVENOUS at 05:50

## 2020-01-04 RX ADMIN — SODIUM CHLORIDE 125 ML/HR: 900 INJECTION, SOLUTION INTRAVENOUS at 07:04

## 2020-01-04 RX ADMIN — VANCOMYCIN HYDROCHLORIDE 1500 MG: 10 INJECTION, POWDER, LYOPHILIZED, FOR SOLUTION INTRAVENOUS at 00:48

## 2020-01-04 RX ADMIN — CEFEPIME HYDROCHLORIDE 2 G: 2 INJECTION, POWDER, FOR SOLUTION INTRAVENOUS at 00:39

## 2020-01-04 NOTE — ASSESSMENT & PLAN NOTE
? Source]   IV abx started in ER  Follow Blood culture  Urine culture  IV fluids    CXR and UA negative  WBC 6.3-10.9  T 101  Lactic acid 2.4

## 2020-01-04 NOTE — ED NOTES
Pt is resting in bed vitals are stable, awakens to verbal stimuli      Henrietta Lynn RN  01/04/20 [phone]

## 2020-01-04 NOTE — ED NOTES
Pt is resting comfortably in bed, grandson at bedside. Pt refused lactulose, educated on elevated ammonia level.      Henrietta Lynn RN  01/04/20 [id number]

## 2020-01-04 NOTE — ED NOTES
Frequent fall recently. Having spasms in both legs, unable to keep legs still. Bruising around left eye and left upper arm from fall 1 month ago     Berta Araujo RN  01/03/20 2018

## 2020-01-04 NOTE — H&P
[facility] Medicine Services      Patient Name: Catalina Moreon  : 1940  MRN: [id number]  Primary Care Physician: Anayeli Costa APRN  Date of admission: 1/3/2020    Patient Care Team:  Anayeli Costa APRN as PCP - General (Family Medicine)    Subjective   History Present Illness     Chief Complaint:   Chief Complaint   Patient presents with   • Fall     Ms. Moreno is a 79 y.o.  White female with multiple medical problems, presented to [facility] with mental status change, patient was brought to ER by grandson with increase agitation and confusion, symtoms started this am, apparently was seen at outlying facility and diagnosed with UTI, she was started on po Keflex, She took one dose, patient was given ativan in ER, and admitted to [facility].    At bedside, patient is nonverbal rocking in the bed, moving all extremities, airway intact.  Patient lives by herself per grandson.  There is no known febrile sick contacts.       Personal History     Past Medical History:   Past Medical History:   Diagnosis Date   • Arthritis    • Atrial fibrillation (CMS/HCC)    • Broken shoulder     left-- due to fall 19 was at [facility]   • Buttock pain     rt side   • Depression    • GERD (gastroesophageal reflux disease)    • H/O fall    • Hip pain     rt   • Hypertension    • Hypothyroidism    • Leg pain     rt   • Low back pain    • Occipital bone fracture (CMS/HCC)     left- due to fall   19 was inpt at [facility]       Surgical History:      Past Surgical History:   Procedure Laterality Date   • BACK SURGERY  2018    PDC &  PSF L3-L5 insitu   • CHOLECYSTECTOMY     • HYSTERECTOMY     • ROTATOR CUFF REPAIR Left            Family History: family history includes Cancer in her father, paternal aunt, and paternal uncle; Diabetes in her son; Heart disease in her paternal aunt. Otherwise pertinent FHx was reviewed and unremarkable.     Social History:  reports that she has never  smoked. She has never used smokeless tobacco. She reports that she does not drink alcohol or use drugs.      Medications:  Prior to Admission medications    Medication Sig Start Date End Date Taking? Authorizing Provider   amitriptyline (ELAVIL) 50 MG tablet Take 50 mg by mouth every night at bedtime. 12/4/19  Yes Elton Flores MD   cephalexin (KEFLEX) 500 MG capsule Take 500 mg by mouth 3 (Three) Times a Day. 1/3/20 1/13/20 Yes Elton Flores MD   ELIQUIS 5 MG tablet tablet Take 5 mg by mouth 2 (Two) Times a Day. 4/16/19  Yes Elton Flores MD   famotidine (PEPCID) 20 MG tablet Take 20 mg by mouth 2 (Two) Times a Day. 6/19/19  Yes Elton Flores MD   furosemide (LASIX) 20 MG tablet Take 20 mg by mouth Daily. 6/27/19  Yes Elton Flores MD   gabapentin (NEURONTIN) 600 MG tablet Take 600 mg by mouth 3 (Three) Times a Day.   Yes Elton Flores MD   HYDROcodone-acetaminophen (NORCO)  MG per tablet Take 1 tablet by mouth 4 (Four) Times a Day As Needed for Moderate Pain . 12/5/19  Yes Shefali Worthy MD   levothyroxine (SYNTHROID, LEVOTHROID) 75 MCG tablet Take 75 mcg by mouth Daily. 6/24/19  Yes Elton Flores MD   metoprolol succinate XL (TOPROL-XL) 50 MG 24 hr tablet Take 50 mg by mouth Daily.   Yes Elton Flores MD   pantoprazole (PROTONIX) 40 MG EC tablet Take 40 mg by mouth Daily. 6/24/19  Yes Elton Flores MD       Allergies:    Allergies   Allergen Reactions   • Baclofen Rash   • Codeine Nausea Only   • Ibuprofen Unknown (See Comments)     Patient doesn't know----Motin   • Methocarbamol Unknown (See Comments)     Patient doesn't know   • Naproxen Unknown (See Comments)     Pt. Doesn't know    • Tizanidine Hcl Hallucinations   • Tolmetin Unknown (See Comments)     Pt. Doesn't know-- same as Tolectin       Objective   Objective     Vital Signs  Temp:  [97.5 °F (36.4 °C)-98.5 °F (36.9 °C)] 97.7 °F (36.5 °C)  Heart Rate:  [76-98] 89  Resp:   [14-22] 16  BP: (105-167)/(52-71) 105/57  SpO2:  [97 %-100 %] 98 %  on  Flow (L/min):  [2.5-3.5] 3;   Device (Oxygen Therapy): room air  Body mass index is 27.85 kg/m².    Physical Exam   Constitutional: She appears well-developed and well-nourished. No distress.   HENT:   Head: Normocephalic and atraumatic.   Mouth/Throat: No oropharyngeal exudate.   Eyes: Pupils are equal, round, and reactive to light. Conjunctivae and EOM are normal. Right eye exhibits no discharge. Left eye exhibits no discharge. No scleral icterus.   Neck: Normal range of motion. No JVD present. No tracheal deviation present. No thyromegaly present.   Cardiovascular: Normal rate, regular rhythm, normal heart sounds and intact distal pulses. Exam reveals no gallop and no friction rub.   No murmur heard.  Pulmonary/Chest: Effort normal and breath sounds normal. No stridor. No respiratory distress. She has no wheezes. She has no rales. She exhibits no tenderness.   Abdominal: Soft. Bowel sounds are normal. She exhibits no distension and no mass. There is no tenderness. There is no rebound and no guarding. No hernia.   Musculoskeletal: Normal range of motion. She exhibits no edema, tenderness or deformity.   Lymphadenopathy:     She has no cervical adenopathy.   Neurological: No cranial nerve deficit or sensory deficit. She exhibits normal muscle tone. Coordination normal.   Skin: Skin is warm and dry. No rash noted. She is not diaphoretic. No erythema.   Psychiatric: She has a normal mood and affect. Her behavior is normal.   Nursing note and vitals reviewed.      Results Review:  I have personally reviewed most recent lab results and agree with findings,    Results from last 7 days   Lab Units 01/04/20  1348  01/03/20  2340   WBC 10*3/mm3 9.90   < >  --    HEMOGLOBIN g/dL 10.3*   < >  --    HEMATOCRIT % 31.7*   < >  --    PLATELETS 10*3/mm3 286   < >  --    INR   --   --  1.18*    < > = values in this interval not displayed.     Results from last  7 days   Lab Units 01/04/20  1348 01/04/20  0818 01/04/20  0510  01/03/20  2112   SODIUM mmol/L  --  145  --   --  139   POTASSIUM mmol/L  --  3.1*  --   --  3.6   CHLORIDE mmol/L  --  106  --   --  93*   CO2 mmol/L  --  26.0  --   --  26.0   BUN mg/dL  --  32*  --   --  30*   CREATININE mg/dL  --  0.93  --   --  1.06*   GLUCOSE mg/dL  --  116*  --   --  150*   CALCIUM mg/dL  --  9.0  --   --  10.0   ALT (SGPT) U/L  --  13  --   --  15   AST (SGOT) U/L  --  33*  --   --  41*   TROPONIN T ng/mL  --   --   --   --  <0.010   PROBNP pg/mL 5,704.0*  --   --   --   --    LACTATE mmol/L  --   --  1.1   < >  --    PROCALCITONIN ng/mL 0.06*  --   --   --   --     < > = values in this interval not displayed.     Estimated Creatinine Clearance: 49 mL/min (by C-G formula based on SCr of 0.93 mg/dL).  Brief Urine Lab Results  (Last result in the past 365 days)      Color   Clarity   Blood   Leuk Est   Nitrite   Protein   CREAT   Urine HCG        01/04/20 0021 Yellow Clear Negative Negative Negative Trace               Microbiology Results (last 10 days)     Procedure Component Value - Date/Time    Blood Culture - Blood, Arm, Left [[id number]] Collected:  01/04/20 0031    Lab Status:  Preliminary result Specimen:  Blood from Arm, Left Updated:  01/05/20 0045     Blood Culture No growth at 24 hours    Respiratory Panel, PCR - Swab, Nasopharynx [[id number]]  (Normal) Collected:  01/04/20 0017    Lab Status:  Final result Specimen:  Swab from Nasopharynx Updated:  01/04/20 0246     ADENOVIRUS, PCR Not Detected     Coronavirus 229E Not Detected     Coronavirus HKU1 Not Detected     Coronavirus NL63 Not Detected     Coronavirus OC43 Not Detected     Human Metapneumovirus Not Detected     Human Rhinovirus/Enterovirus Not Detected     Influenza B PCR Not Detected     Parainfluenza Virus 1 Not Detected     Parainfluenza Virus 2 Not Detected     Parainfluenza Virus 3 Not Detected     Parainfluenza Virus 4 Not Detected     Bordetella  pertussis pcr Not Detected     Influenza A H1 2009 PCR Not Detected     Chlamydophila pneumoniae PCR Not Detected     Mycoplasma pneumo by PCR Not Detected     Influenza A PCR Not Detected     Influenza A H3 Not Detected     Influenza A H1 Not Detected     RSV, PCR Not Detected    Blood Culture - Blood, Arm, Right [[id number]] Collected:  01/03/20 2340    Lab Status:  Preliminary result Specimen:  Blood from Arm, Right Updated:  01/05/20 0001     Blood Culture No growth at 24 hours          ECG/EMG Results (most recent)     Procedure Component Value Units Date/Time    ECG 12 Lead [[id number]] Collected:  01/03/20 2258     Updated:  01/05/20 0648    Narrative:       HEART RATE= 107  bpm  RR Interval= 562  ms  SD Interval= 373  ms  P Horizontal Axis= 193  deg  P Front Axis= 0  deg  QRSD Interval= 137  ms  QT Interval= 247  ms  QRS Axis= 47  deg  T Wave Axis= 251  deg  - ABNORMAL ECG -  Atrial-ventricular dual-paced complexes  When compared with ECG of 12-Jan-2019 6:04:32,  Significant change in rhythm  Electronically Signed By: Mark Rios ([place]) 05-Jan-2020 06:48:30  Date and Time of Study: 2020-01-03 22:58:05          Results for orders placed in visit on 07/15/19   SCANNED VASCULAR STUDIES            Ct Head Without Contrast    Result Date: 1/4/2020  1. No acute intracranial abnormality is identified. 2. Mild generalized brain volume loss and small vessel ischemic changes. Miguel Treadwell M.D. Neuroradiologist Electronically signed by:  Miguel Treadwell M.D.  1/4/2020 12:40 AM    Xr Chest 1 View    Result Date: 1/4/2020   Cardiomegaly with pulmonary vascular congestion.  No focal airspace consolidation or pleural effusion.   Electronically Signed By-Alvino Treadwell On:1/4/2020 8:23 AM This report was finalized on [id number] by  Alvino Treadwell, .        Estimated Creatinine Clearance: 49 mL/min (by C-G formula based on SCr of 0.93 mg/dL).    Assessment/Plan   Assessment/Plan       Active Hospital Problems     Diagnosis  POA   • Altered mental status [R41.82]  Yes   • Sepsis (CMS/HCC) [A41.9]  Unknown   • Hypertension [I10]  Yes   • Gastroesophageal reflux disease [K21.9]  Yes   • Hypothyroidism [E03.9]  Yes      Resolved Hospital Problems   No resolved problems to display.       Active Hospital Problems:  Sepsis (CMS/HCC)  ? Source]   IV abx started in ER  Follow Blood culture  Urine culture  IV fluids    CXR and UA negative  WBC 6.3-10.9  T 101  Lactic acid 2.4      Altered mental status- (present on admission)  Likely secondary to infectious process    IV abx  IV fluids  BC   UA CS      Hypothyroidism- (present on admission)  Continue synthroid   Check TSH     Hypertension- (present on admission)   Hold bp meds  Monitor BP    Gastroesophageal reflux disease- (present on admission)  protonix q day     VTE Prophylaxis - SCDs.    CODE STATUS:    Code Status and Medical Interventions:   Ordered at: 01/04/20 0550     Code Status:    CPR     Medical Interventions (Level of Support Prior to Arrest):    Full       Admission Status:  I believe this patient meets impatient  criteria.      I discussed the patients findings and my recommendations with patient and family.        Electronically signed by Irving Oh MD, 01/04/20, 1:26 PM.  [facility] Hospitalist Team

## 2020-01-04 NOTE — ED NOTES
Order for IV fluid bolus order of 2,178ml ordered by Dr. Oh, unknown clinical reason. Call out to Dr. Oh. Pt alert and communicating with staff, pt is not hypotensive and repeat lactic 1.1. Will discuss before giving.      Cee Brasher, RN  01/04/20 9148

## 2020-01-04 NOTE — ED NOTES
Dr. Oh called back and discussed fluid bolus order. Telephone order to HOLD the bolus.      Cee Brasher RN  01/04/20 8974

## 2020-01-04 NOTE — ASSESSMENT & PLAN NOTE
Likely secondary to polypharmacy, chronic back pain on narcotics, ? infectious    on IV abx dc in am  BC (-)x1day  UA-(-)  procal neg

## 2020-01-04 NOTE — ED NOTES
Called medical records @ [facility] spoke to Karen to obtain ER note, labs,and imaging.      Sahra Drew  01/03/20 8131

## 2020-01-04 NOTE — PROGRESS NOTES
"Pharmacokinetic Consult - Vancomycin Dosing    Catalina Moreno is a 79 y.o. year old female  with sepsis. Pharmacy has been consulted to dose vancomycin.         Assessment/Plan  1. Day #1 vancomycin: Goal trough: 15-20 mcg/mL with -600 mg*h/mL. Patient given 1500 mg (~20 mg/kg ABW) IV x 1 in ED last night and 1000 mg (~14 mg/kg ABW) IV q24h ordered to start today at 2300. Obtain vancomycin peak on 1/6 at 0300 and trough on 1/6 at 2200 for clinical review, or earlier if clinically warranted.     2. Day #1  IV cefepime 2g IV q12h, appropriate for CrCL 30-59 ml/min    3. Pharmacy will continue to monitor for changes in culture results, renal function, and clinical status. Adjustments in antimicrobial pharmacokinetic dosing will be made per protocol and recommendations for de-escalation and adjustments to antimicrobial therapy will be communicated when clinically appropriate.        Relevant clinical data and objective history reviewed:  162.6 cm (64\")   73.6 kg (162 lb 4.1 oz)   Ideal body weight: 54.7 kg (120 lb 9.5 oz)  Adjusted ideal body weight: 62.3 kg (137 lb 4.1 oz)    Lab Results   Component Value Date    CREATININE 0.93 01/04/2020    CREATININE 1.06 (H) 01/03/2020    CREATININE 1.3 (H) 06/05/2019     Estimated Creatinine Clearance: 49 mL/min (by C-G formula based on SCr of 0.93 mg/dL).  I/O last 3 completed shifts:  In: 1250 [IV Piggyback:1250]  Out: -     Lab Results   Component Value Date    WBC 9.90 01/04/2020    WBC 10.90 (H) 01/04/2020    WBC 6.30 01/03/2020     Temperature    01/04/20 0023 01/04/20 0252 01/04/20 1502   Temp: 99 °F (37.2 °C) (!) 100.9 °F (38.3 °C) 97.5 °F (36.4 °C)             Baseline culture/source/susceptibility:  Microbiology Results (last 10 days)       Procedure Component Value - Date/Time    Respiratory Panel, PCR - Swab, Nasopharynx [[id number]]  (Normal) Collected:  01/04/20 0017    Lab Status:  Final result Specimen:  Swab from Nasopharynx Updated:  01/04/20 0246     " ADENOVIRUS, PCR Not Detected     Coronavirus 229E Not Detected     Coronavirus HKU1 Not Detected     Coronavirus NL63 Not Detected     Coronavirus OC43 Not Detected     Human Metapneumovirus Not Detected     Human Rhinovirus/Enterovirus Not Detected     Influenza B PCR Not Detected     Parainfluenza Virus 1 Not Detected     Parainfluenza Virus 2 Not Detected     Parainfluenza Virus 3 Not Detected     Parainfluenza Virus 4 Not Detected     Bordetella pertussis pcr Not Detected     Influenza A H1 2009 PCR Not Detected     Chlamydophila pneumoniae PCR Not Detected     Mycoplasma pneumo by PCR Not Detected     Influenza A PCR Not Detected     Influenza A H3 Not Detected     Influenza A H1 Not Detected     RSV, PCR Not Detected             Kiah Donaldson, PharmD, BCPS

## 2020-01-04 NOTE — ED NOTES
Pt removed from bedpan, pt did not urinate. New lori pad placed under patient. Will cont to monitor.      Cee Brasher RN  01/04/20 6344

## 2020-01-05 ENCOUNTER — APPOINTMENT (OUTPATIENT)
Dept: MRI IMAGING | Facility: HOSPITAL | Age: 80
End: 2020-01-05

## 2020-01-05 PROBLEM — A41.9 SEPSIS: Status: RESOLVED | Noted: 2020-01-04 | Resolved: 2020-01-05

## 2020-01-05 PROCEDURE — G0378 HOSPITAL OBSERVATION PER HR: HCPCS

## 2020-01-05 PROCEDURE — 72148 MRI LUMBAR SPINE W/O DYE: CPT

## 2020-01-05 PROCEDURE — 25010000002 VANCOMYCIN 1 G RECONSTITUTED SOLUTION 1 EACH VIAL: Performed by: EMERGENCY MEDICINE

## 2020-01-05 PROCEDURE — 25010000002 FUROSEMIDE PER 20 MG: Performed by: INTERNAL MEDICINE

## 2020-01-05 PROCEDURE — 25010000002 CEFEPIME PER 500 MG: Performed by: INTERNAL MEDICINE

## 2020-01-05 PROCEDURE — 99232 SBSQ HOSP IP/OBS MODERATE 35: CPT | Performed by: INTERNAL MEDICINE

## 2020-01-05 RX ORDER — FUROSEMIDE 10 MG/ML
40 INJECTION INTRAMUSCULAR; INTRAVENOUS ONCE
Status: COMPLETED | OUTPATIENT
Start: 2020-01-05 | End: 2020-01-05

## 2020-01-05 RX ORDER — HYDROCODONE BITARTRATE AND ACETAMINOPHEN 10; 325 MG/1; MG/1
1 TABLET ORAL EVERY 6 HOURS PRN
Status: DISCONTINUED | OUTPATIENT
Start: 2020-01-05 | End: 2020-01-07 | Stop reason: HOSPADM

## 2020-01-05 RX ORDER — GABAPENTIN 300 MG/1
300 CAPSULE ORAL EVERY 8 HOURS SCHEDULED
Status: DISCONTINUED | OUTPATIENT
Start: 2020-01-05 | End: 2020-01-07 | Stop reason: HOSPADM

## 2020-01-05 RX ADMIN — HYDROCODONE BITARTRATE AND ACETAMINOPHEN 1 TABLET: 10; 325 TABLET ORAL at 19:26

## 2020-01-05 RX ADMIN — SODIUM CHLORIDE 1000 MG: 900 INJECTION, SOLUTION INTRAVENOUS at 22:59

## 2020-01-05 RX ADMIN — GABAPENTIN 300 MG: 300 CAPSULE ORAL at 13:10

## 2020-01-05 RX ADMIN — CEFEPIME HYDROCHLORIDE 2 G: 2 INJECTION, POWDER, FOR SOLUTION INTRAVENOUS at 17:42

## 2020-01-05 RX ADMIN — POTASSIUM CHLORIDE 40 MEQ: 1500 TABLET, EXTENDED RELEASE ORAL at 04:47

## 2020-01-05 RX ADMIN — POTASSIUM CHLORIDE 40 MEQ: 1500 TABLET, EXTENDED RELEASE ORAL at 00:07

## 2020-01-05 RX ADMIN — GABAPENTIN 300 MG: 300 CAPSULE ORAL at 04:47

## 2020-01-05 RX ADMIN — FUROSEMIDE 40 MG: 10 INJECTION, SOLUTION INTRAMUSCULAR; INTRAVENOUS at 17:42

## 2020-01-05 RX ADMIN — GABAPENTIN 300 MG: 300 CAPSULE ORAL at 21:21

## 2020-01-05 RX ADMIN — HYDROCODONE BITARTRATE AND ACETAMINOPHEN 1 TABLET: 5; 325 TABLET ORAL at 05:57

## 2020-01-05 RX ADMIN — HYDROCODONE BITARTRATE AND ACETAMINOPHEN 1 TABLET: 5; 325 TABLET ORAL at 13:10

## 2020-01-05 RX ADMIN — CEFEPIME HYDROCHLORIDE 2 G: 2 INJECTION, POWDER, FOR SOLUTION INTRAVENOUS at 05:58

## 2020-01-05 NOTE — PLAN OF CARE
Pt resting in bed, no distress noted. Pain improving with medication changes. Will cont to monitor.

## 2020-01-05 NOTE — PLAN OF CARE
Patient is alert and oriented.  Patient has had some pain tonight since arriving onto floor.  Patient was a transfer from [facility]

## 2020-01-05 NOTE — ASSESSMENT & PLAN NOTE
C/o severe back pain, take pain meds 4 x day at home  S/p L4 surgery, last MRI 6 month ago  Will recheck

## 2020-01-05 NOTE — PROGRESS NOTES
"Pharmacokinetic Consult - Vancomycin Dosing    Catalina Moreno is a 79 y.o. year old female  with sepsis. Pharmacy has been consulted to dose vancomycin.         Assessment/Plan  1. Day #2 vancomycin: Goal trough: 15-20 mcg/mL with -600 mg*h/mL. Patient given 1500 mg (~20 mg/kg ABW) IV x 1 in ED then 1000 mg (~14 mg/kg ABW) IV q24h. Obtain vancomycin peak on 1/6 at 0300 and trough on 1/6 at 2200 for clinical review, or earlier if clinically warranted.     2. Day #2  IV cefepime 2g IV q12h, appropriate for CrCL 30-59 ml/min    3. Pharmacy will continue to monitor for changes in culture results, renal function, and clinical status. Adjustments in antimicrobial pharmacokinetic dosing will be made per protocol and recommendations for de-escalation and adjustments to antimicrobial therapy will be communicated when clinically appropriate.        Relevant clinical data and objective history reviewed:  162.6 cm (64\")   73.6 kg (162 lb 4.1 oz)   Ideal body weight: 54.7 kg (120 lb 9.5 oz)  Adjusted ideal body weight: 62.3 kg (137 lb 4.1 oz)    Lab Results   Component Value Date    CREATININE 0.93 01/04/2020    CREATININE 1.06 (H) 01/03/2020    CREATININE 1.3 (H) 06/05/2019     Estimated Creatinine Clearance: 49 mL/min (by C-G formula based on SCr of 0.93 mg/dL).  I/O last 3 completed shifts:  In: 2250 [I.V.:1000; IV Piggyback:1250]  Out: 1050 [Urine:1050]    Lab Results   Component Value Date    WBC 9.90 01/04/2020    WBC 10.90 (H) 01/04/2020    WBC 6.30 01/03/2020     Temperature    01/04/20 1819 01/04/20 2228 01/05/20 0320   Temp: 98.5 °F (36.9 °C) 98 °F (36.7 °C) 98.4 °F (36.9 °C)             Baseline culture/source/susceptibility:  Microbiology Results (last 10 days)       Procedure Component Value - Date/Time    Blood Culture - Blood, Arm, Left [[id number]] Collected:  01/04/20 0031    Lab Status:  Preliminary result Specimen:  Blood from Arm, Left Updated:  01/05/20 0045     Blood Culture No growth at 24 hours "    Respiratory Panel, PCR - Swab, Nasopharynx [[id number]]  (Normal) Collected:  01/04/20 0017    Lab Status:  Final result Specimen:  Swab from Nasopharynx Updated:  01/04/20 0246     ADENOVIRUS, PCR Not Detected     Coronavirus 229E Not Detected     Coronavirus HKU1 Not Detected     Coronavirus NL63 Not Detected     Coronavirus OC43 Not Detected     Human Metapneumovirus Not Detected     Human Rhinovirus/Enterovirus Not Detected     Influenza B PCR Not Detected     Parainfluenza Virus 1 Not Detected     Parainfluenza Virus 2 Not Detected     Parainfluenza Virus 3 Not Detected     Parainfluenza Virus 4 Not Detected     Bordetella pertussis pcr Not Detected     Influenza A H1 2009 PCR Not Detected     Chlamydophila pneumoniae PCR Not Detected     Mycoplasma pneumo by PCR Not Detected     Influenza A PCR Not Detected     Influenza A H3 Not Detected     Influenza A H1 Not Detected     RSV, PCR Not Detected    Blood Culture - Blood, Arm, Right [[id number]] Collected:  01/03/20 2340    Lab Status:  Preliminary result Specimen:  Blood from Arm, Right Updated:  01/05/20 0001     Blood Culture No growth at 24 hours           Danni CalvoD, BCPS

## 2020-01-05 NOTE — PROGRESS NOTES
[facility] Medicine Services Daily Progress Note      Hospitalist Team  LOS 0 days      Patient Care Team:  Anayeli Costa APRN as PCP - General (Family Medicine)    Patient Location: 201/1      Subjective   Subjective     Chief Complaint / Subjective  Chief Complaint   Patient presents with   • Fall       Present on Admission:  • Altered mental status  • Hypertension  • Gastroesophageal reflux disease  • Hypothyroidism  • Lumbar radiculopathy      Brief Synopsis of Hospital Course/HPI  Ms. Moreno is a 79 y.o.  White female with multiple medical problems, presented to [facility] with mental status change, patient was brought to ER by grandson with increase agitation and confusion, symtoms started this am, apparently was seen at outlying facility and diagnosed with UTI, she was started on po Keflex, She took one dose, patient was given ativan in ER, and admitted to [facility].     At bedside, patient is nonverbal rocking in the bed, moving all extremities, airway intact.  Patient lives by herself per grandson.  There is no known febrile sick contacts    Date: c/o severe back pain, has had it for over a year, mental status back to baseline   initially not wanting NH, now agreeable to rehab, will consult PT/OT    Review of Systems   Constitution: Negative.   HENT: Negative.    Eyes: Negative.    Cardiovascular: Negative.    Respiratory: Positive for cough.    Endocrine: Negative.    Hematologic/Lymphatic: Negative.    Skin: Negative.    Musculoskeletal: Positive for back pain.   Gastrointestinal: Positive for diarrhea.   Genitourinary: Negative.    Neurological: Negative.    Psychiatric/Behavioral: Negative.    Allergic/Immunologic: Negative.    All other systems reviewed and are negative.    Objective   Objective      Vital Signs  Temp:  [97.7 °F (36.5 °C)-98.5 °F (36.9 °C)] 97.7 °F (36.5 °C)  Heart Rate:  [89-98] 89  Resp:  [16-22] 16  BP: (105-162)/(52-71) 105/57  Oxygen  "Therapy  SpO2: 98 %  Pulse Oximetry Type: Intermittent  Device (Oxygen Therapy): nasal cannula  Flow (L/min): 3  Flowsheet Rows      First Filed Value   Admission Height  162.6 cm (64\") Documented at 01/03/2020 1954   Admission Weight  72.6 kg (160 lb 0.9 oz) Documented at 01/03/2020 1954        Intake & Output (last 3 days)       01/02 0701 - 01/03 0700 01/03 0701 - 01/04 0700 01/04 0701 - 01/05 0700 01/05 0701 - 01/06 0700    P.O.    360    I.V. (mL/kg)   1000 (13.6)     IV Piggyback  1250      Total Intake(mL/kg)  1250 (17.2) 1000 (13.6) 360 (4.9)    Urine (mL/kg/hr)   1050 (0.6) 450 (0.6)    Stool    1    Total Output   1050 451    Net  +1250 -50 -91            Urine Unmeasured Occurrence   3 x 2 x        Lines, Drains & Airways    Active LDAs     Name:   Placement date:   Placement time:   Site:   Days:    Peripheral IV 01/03/20 2202 Left Forearm   01/03/20 2202    Forearm   1                Physical Exam   Constitutional: She is oriented to person, place, and time. She appears well-developed and well-nourished. No distress.   HENT:   Head: Normocephalic and atraumatic.   Mouth/Throat: No oropharyngeal exudate.   Eyes: Pupils are equal, round, and reactive to light. Conjunctivae and EOM are normal. Right eye exhibits no discharge. Left eye exhibits no discharge. No scleral icterus.   Neck: Normal range of motion. No JVD present. No tracheal deviation present. No thyromegaly present.   Cardiovascular: Normal rate, regular rhythm, normal heart sounds and intact distal pulses. Exam reveals no gallop and no friction rub.   No murmur heard.  Pulmonary/Chest: Effort normal and breath sounds normal. No stridor. No respiratory distress. She has no wheezes. She has no rales. She exhibits no tenderness.   Abdominal: Soft. Bowel sounds are normal. She exhibits no distension and no mass. There is no tenderness. There is no rebound and no guarding. No hernia.   Musculoskeletal: Normal range of motion. She exhibits no " edema, tenderness or deformity.   Back pain   Lymphadenopathy:     She has no cervical adenopathy.   Neurological: She is alert and oriented to person, place, and time. No cranial nerve deficit or sensory deficit. She exhibits normal muscle tone. Coordination normal.   Skin: Skin is warm and dry. No rash noted. She is not diaphoretic. No erythema.   Psychiatric: She has a normal mood and affect. Her behavior is normal.   Nursing note and vitals reviewed.        Procedures:      Results Review:     I reviewed the patient's new clinical results.      Lab Results (last 24 hours)     Procedure Component Value Units Date/Time    Blood Culture - Blood, Arm, Left [[id number]] Collected:  01/04/20 0031    Specimen:  Blood from Arm, Left Updated:  01/05/20 0045     Blood Culture No growth at 24 hours    Blood Culture - Blood, Arm, Right [[id number]] Collected:  01/03/20 2340    Specimen:  Blood from Arm, Right Updated:  01/05/20 0001     Blood Culture No growth at 24 hours    Vitamin B12 [[id number]]  (Normal) Collected:  01/04/20 1348    Specimen:  Blood from Arm, Left Updated:  01/04/20 1836     Vitamin B-12 449 pg/mL         No results found for: HGBA1C  Results from last 7 days   Lab Units 01/03/20  2340   INR  1.18*       Results from last 7 days   Lab Units 01/03/20  2324   PH, ARTERIAL pH units 7.522*   PO2 ART mm Hg 69.1*   PCO2, ARTERIAL mm Hg 31.0*   HCO3 ART mmol/L 25.5         Microbiology Results (last 10 days)     Procedure Component Value - Date/Time    Blood Culture - Blood, Arm, Left [[id number]] Collected:  01/04/20 0031    Lab Status:  Preliminary result Specimen:  Blood from Arm, Left Updated:  01/05/20 0045     Blood Culture No growth at 24 hours    Respiratory Panel, PCR - Swab, Nasopharynx [[id number]]  (Normal) Collected:  01/04/20 0017    Lab Status:  Final result Specimen:  Swab from Nasopharynx Updated:  01/04/20 0246     ADENOVIRUS, PCR Not Detected     Coronavirus 229E Not Detected     Coronavirus  HKU1 Not Detected     Coronavirus NL63 Not Detected     Coronavirus OC43 Not Detected     Human Metapneumovirus Not Detected     Human Rhinovirus/Enterovirus Not Detected     Influenza B PCR Not Detected     Parainfluenza Virus 1 Not Detected     Parainfluenza Virus 2 Not Detected     Parainfluenza Virus 3 Not Detected     Parainfluenza Virus 4 Not Detected     Bordetella pertussis pcr Not Detected     Influenza A H1 2009 PCR Not Detected     Chlamydophila pneumoniae PCR Not Detected     Mycoplasma pneumo by PCR Not Detected     Influenza A PCR Not Detected     Influenza A H3 Not Detected     Influenza A H1 Not Detected     RSV, PCR Not Detected    Blood Culture - Blood, Arm, Right [[id number]] Collected:  01/03/20 2340    Lab Status:  Preliminary result Specimen:  Blood from Arm, Right Updated:  01/05/20 0001     Blood Culture No growth at 24 hours          ECG/EMG Results (most recent)     Procedure Component Value Units Date/Time    ECG 12 Lead [[id number]] Collected:  01/03/20 2258     Updated:  01/05/20 0648    Narrative:       HEART RATE= 107  bpm  RR Interval= 562  ms  MO Interval= 373  ms  P Horizontal Axis= 193  deg  P Front Axis= 0  deg  QRSD Interval= 137  ms  QT Interval= 247  ms  QRS Axis= 47  deg  T Wave Axis= 251  deg  - ABNORMAL ECG -  Atrial-ventricular dual-paced complexes  When compared with ECG of 12-Jan-2019 6:04:32,  Significant change in rhythm  Electronically Signed By: Mark Rios ([place]) 05-Jan-2020 06:48:30  Date and Time of Study: 2020-01-03 22:58:05          Results for orders placed in visit on 07/15/19   SCANNED VASCULAR STUDIES            Ct Head Without Contrast    Result Date: 1/4/2020  1. No acute intracranial abnormality is identified. 2. Mild generalized brain volume loss and small vessel ischemic changes. Miguel Treadwell M.D. Neuroradiologist Electronically signed by:  Miguel Treadwell M.D.  1/4/2020 12:40 AM    Xr Chest 1 View    Result Date: 1/4/2020   Cardiomegaly with pulmonary  vascular congestion.  No focal airspace consolidation or pleural effusion.   Electronically Signed By-Alvino Treadwell On:1/4/2020 8:23 AM This report was finalized on [id number] by  Alvino Treadwell, .      Xrays, labs reviewed personally by physician.    Medication Review:   I have reviewed the patient's current medication list      Scheduled Meds    [START ON 1/6/2020] !Vancomycin Level Draw Needed  Does not apply Once   [START ON 1/6/2020] !Vancomycin Level Draw Needed  Does not apply Once   cefepime 2 g Intravenous Q12H   furosemide 40 mg Intravenous Once   gabapentin 300 mg Oral Q8H   lactulose 30 g Oral Daily   sodium chloride 30 mL/kg Intravenous Once   vancomycin 1,000 mg Intravenous Q24H       Meds Infusions    Pharmacy to dose vancomycin        Meds PRN  HYDROcodone-acetaminophen  •  HYDROcodone-acetaminophen  •  ipratropium-albuterol  •  Pharmacy to dose vancomycin  •  potassium chloride **OR** potassium chloride **OR** potassium chloride  •  [COMPLETED] Insert peripheral IV **AND** sodium chloride        Assessment/Plan   Assessment/Plan     Active Hospital Problems    Diagnosis  POA   • **Altered mental status [R41.82]  Yes   • Lumbar radiculopathy [M54.16]  Yes   • Hypertension [I10]  Yes   • Gastroesophageal reflux disease [K21.9]  Yes   • Hypothyroidism [E03.9]  Yes      Resolved Hospital Problems    Diagnosis Date Resolved POA   • Sepsis (CMS/HCC) [A41.9] 01/05/2020 Unknown         Active Hospital Problems:  * Altered mental status- (present on admission)  Likely secondary to polypharmacy, chronic back pain on narcotics, ? infectious    on IV abx dc in am  BC (-)x1day  UA-(-)  procal neg      Lumbar radiculopathy- (present on admission)  C/o severe back pain, take pain meds 4 x day at home  S/p L4 surgery, last MRI 6 month ago  Will recheck     Hypothyroidism- (present on admission)  Continue synthroid   nl TSH     Hypertension- (present on admission)   Hold bp meds  Monitor BP    Gastroesophageal  reflux disease- (present on admission)  protonix q day           Resolved Hospital Problems:  Sepsis (CMS/HCC)-resolved as of 1/5/2020  ? Source]   IV abx started in ER  Follow Blood culture  Urine culture  IV fluids    CXR and UA negative  WBC 6.3-10.9  T 101  Lactic acid 2.4        Consult pt/ot >placement      VTE Prophylaxis - SCDs.      Code Status -   Code Status and Medical Interventions:   Ordered at: 01/04/20 0550     Code Status:    CPR     Medical Interventions (Level of Support Prior to Arrest):    Full       Discharge Planning    Destination      Coordination has not been started for this encounter.      Durable Medical Equipment      Coordination has not been started for this encounter.      Dialysis/Infusion      Coordination has not been started for this encounter.      Home Medical Care      Coordination has not been started for this encounter.      Therapy      Coordination has not been started for this encounter.      Community Resources      Coordination has not been started for this encounter.            Electronically signed by Irving Oh MD, 01/05/20, 5:15 PM.  [facility] Hospitalist Team

## 2020-01-06 LAB
ANION GAP SERPL CALCULATED.3IONS-SCNC: 15 MMOL/L (ref 5–15)
BUN BLD-MCNC: 21 MG/DL (ref 8–23)
BUN/CREAT SERPL: 22.3 (ref 7–25)
CALCIUM SPEC-SCNC: 8.5 MG/DL (ref 8.6–10.5)
CHLORIDE SERPL-SCNC: 102 MMOL/L (ref 98–107)
CO2 SERPL-SCNC: 21 MMOL/L (ref 22–29)
CREAT BLD-MCNC: 0.94 MG/DL (ref 0.57–1)
FOLATE SERPL-MCNC: >20 NG/ML (ref 4.78–24.2)
GFR SERPL CREATININE-BSD FRML MDRD: 58 ML/MIN/1.73
GLUCOSE BLD-MCNC: 126 MG/DL (ref 65–99)
POTASSIUM BLD-SCNC: 3.8 MMOL/L (ref 3.5–5.2)
SODIUM BLD-SCNC: 138 MMOL/L (ref 136–145)
VANCOMYCIN PEAK SERPL-MCNC: 25.2 MCG/ML (ref 20–40)

## 2020-01-06 PROCEDURE — 97161 PT EVAL LOW COMPLEX 20 MIN: CPT

## 2020-01-06 PROCEDURE — 80048 BASIC METABOLIC PNL TOTAL CA: CPT | Performed by: INTERNAL MEDICINE

## 2020-01-06 PROCEDURE — 99232 SBSQ HOSP IP/OBS MODERATE 35: CPT | Performed by: INTERNAL MEDICINE

## 2020-01-06 PROCEDURE — 80202 ASSAY OF VANCOMYCIN: CPT | Performed by: INTERNAL MEDICINE

## 2020-01-06 PROCEDURE — 97535 SELF CARE MNGMENT TRAINING: CPT

## 2020-01-06 PROCEDURE — 97530 THERAPEUTIC ACTIVITIES: CPT

## 2020-01-06 PROCEDURE — 97116 GAIT TRAINING THERAPY: CPT

## 2020-01-06 PROCEDURE — 97165 OT EVAL LOW COMPLEX 30 MIN: CPT

## 2020-01-06 PROCEDURE — 25010000002 CEFEPIME PER 500 MG: Performed by: INTERNAL MEDICINE

## 2020-01-06 PROCEDURE — 82746 ASSAY OF FOLIC ACID SERUM: CPT | Performed by: INTERNAL MEDICINE

## 2020-01-06 RX ADMIN — LACTULOSE 30 G: 10 SOLUTION ORAL at 08:12

## 2020-01-06 RX ADMIN — HYDROCODONE BITARTRATE AND ACETAMINOPHEN 1 TABLET: 10; 325 TABLET ORAL at 08:17

## 2020-01-06 RX ADMIN — GABAPENTIN 300 MG: 300 CAPSULE ORAL at 22:45

## 2020-01-06 RX ADMIN — HYDROCODONE BITARTRATE AND ACETAMINOPHEN 1 TABLET: 10; 325 TABLET ORAL at 01:31

## 2020-01-06 RX ADMIN — CEFEPIME HYDROCHLORIDE 2 G: 2 INJECTION, POWDER, FOR SOLUTION INTRAVENOUS at 06:10

## 2020-01-06 RX ADMIN — Medication 10 ML: at 08:12

## 2020-01-06 RX ADMIN — HYDROCODONE BITARTRATE AND ACETAMINOPHEN 1 TABLET: 10; 325 TABLET ORAL at 16:33

## 2020-01-06 RX ADMIN — GABAPENTIN 300 MG: 300 CAPSULE ORAL at 13:29

## 2020-01-06 RX ADMIN — HYDROCODONE BITARTRATE AND ACETAMINOPHEN 1 TABLET: 10; 325 TABLET ORAL at 22:45

## 2020-01-06 RX ADMIN — GABAPENTIN 300 MG: 300 CAPSULE ORAL at 06:10

## 2020-01-06 RX ADMIN — Medication: at 04:08

## 2020-01-06 NOTE — THERAPY EVALUATION
Acute Care - Occupational Therapy Initial Evaluation   [facility]     Patient Name: Catalina Moreno  : 1940  MRN: [id number]  Today's Date: 2020             Admit Date: 1/3/2020       ICD-10-CM ICD-9-CM   1. Altered mental status, unspecified altered mental status type R41.82 780.97   2. Severe sepsis (CMS/HCC) A41.9 038.9    R65.20 995.92     Patient Active Problem List   Diagnosis   • Arthritis   • Neuropathic pain   • Other long term (current) drug therapy   • Polyarthralgia   • Sacroiliitis (CMS/HCC)   • Depressive disorder   • Primary fibromyalgia syndrome   • Gastroesophageal reflux disease   • Hypertension   • Lightheadedness   • Hypothyroidism   • Lumbar radiculopathy   • Altered mental status     Past Medical History:   Diagnosis Date   • Arthritis    • Atrial fibrillation (CMS/HCC)    • Broken shoulder     left-- due to fall 19 was at [facility]   • Buttock pain     rt side   • Depression    • GERD (gastroesophageal reflux disease)    • H/O fall    • Hip pain     rt   • Hypertension    • Hypothyroidism    • Leg pain     rt   • Low back pain    • Occipital bone fracture (CMS/HCC)     left- due to fall   19 was inpt at [facility]     Past Surgical History:   Procedure Laterality Date   • BACK SURGERY  2018    PDC &  PSF L3-L5 insitu   • CHOLECYSTECTOMY     • HYSTERECTOMY     • ROTATOR CUFF REPAIR Left           OT ASSESSMENT FLOWSHEET (last 12 hours)      Occupational Therapy Evaluation     Row Name 20 1400                   OT Evaluation Time/Intention    Subjective Information  complains of;weakness  -MP        Document Type  evaluation  -MP        Mode of Treatment  occupational therapy  -MP        Patient Effort  good  -MP           General Information    Patient Profile Reviewed?  yes  -MP        Prior Level of Function  ADL's;independent:  -MP        Existing Precautions/Restrictions  fall  -MP           Relationship/Environment    Lives With  alone  -MP        Family Caregiver  if Needed  grandchild(evangelista), adult  -MP           Resource/Environmental Concerns    Current Living Arrangements  home/apartment/condo  -MP        Resource/Environmental Concerns  none  -MP        Transportation Concerns  car, none  -MP           Cognitive Assessment/Intervention- PT/OT    Orientation Status (Cognition)  oriented x 4  -MP           Bed Mobility Assessment/Treatment    Bed Mobility Assessment/Treatment  bed mobility (all) activities  -MP        Yarnell Level (Bed Mobility)  independent  -MP           Functional Mobility    Functional Mobility- Ind. Level  supervision required;1 person  -MP        Functional Mobility- Device  rolling walker  -MP           Sit-Stand Transfer    Sit-Stand Yarnell (Transfers)  supervision;1 person assist  -MP        Assistive Device (Sit-Stand Transfers)  walker, front-wheeled  -MP           ADL Assessment/Intervention    BADL Assessment/Intervention  toileting  -MP           Toileting Assessment/Training    Yarnell Level (Toileting)  toileting skills;supervision;set up  -MP        Toileting Position  unsupported sitting;supported standing  -MP           General ROM    GENERAL ROM COMMENTS  B shoulder AROM limited 50%  -MP           MMT (Manual Muscle Testing)    General MMT Comments  BUE 4-/5  -MP           Dynamic Standing Balance    Level of Yarnell, Reaches Outside Midline (Standing, Dynamic Balance)  contact guard assist;1 person assist  -MP        Assistive Device Utilized (Supported Standing, Dynamic Balance)  walker, rolling  -MP           Positioning and Restraints    Pre-Treatment Position  in bed  -MP        Post Treatment Position  chair  -MP        In Chair  call light within reach;encouraged to call for assist;exit alarm on  -MP           Pain Scale: Numbers Pre/Post-Treatment    Pain Scale: Numbers, Pretreatment  0/10 - no pain  -MP        Pain Scale: Numbers, Post-Treatment  0/10 - no pain  -MP           Plan of Care Review    Plan of  Care Reviewed With  patient  -MP        Progress  improving  -MP           Clinical Impression (OT)    Criteria for Skilled Therapeutic Interventions Met (OT Eval)  yes;treatment indicated  -MP        Rehab Potential (OT Eval)  good, to achieve stated therapy goals  -MP        Care Plan Review (OT)  evaluation/treatment results reviewed  -MP        Anticipated Equipment Needs at Discharge (OT)  bedside commode 3-in-1 commode  -MP        Anticipated Discharge Disposition (OT)  home with home health  -MP           OT Goals    Transfer Goal Selection (OT)  transfer, OT goal 1  -MP        Dressing Goal Selection (OT)  dressing, OT goal 1  -MP        Toileting Goal Selection (OT)  toileting, OT goal 1  -MP           Transfer Goal 1 (OT)    Activity/Assistive Device (Transfer Goal 1, OT)  transfers, all  -MP        Dierks Level/Cues Needed (Transfer Goal 1, OT)  independent  -MP        Time Frame (Transfer Goal 1, OT)  long term goal (LTG);2 weeks  -MP           Dressing Goal 1 (OT)    Activity/Assistive Device (Dressing Goal 1, OT)  dressing skills, all  -MP        Dierks/Cues Needed (Dressing Goal 1, OT)  independent  -MP        Time Frame (Dressing Goal 1, OT)  long term goal (LTG);2 weeks  -MP           Toileting Goal 1 (OT)    Activity/Device (Toileting Goal 1, OT)  toileting skills, all  -MP        Dierks Level/Cues Needed (Toileting Goal 1, OT)  independent  -MP        Time Frame (Toileting Goal 1, OT)  long term goal (LTG);2 weeks  -MP          User Key  (r) = Recorded By, (t) = Taken By, (c) = Cosigned By    Initials Name Effective Dates    Ryan Sheehan, OT 03/01/19 -                OT Recommendation and Plan  Outcome Summary/Treatment Plan (OT)  Anticipated Equipment Needs at Discharge (OT): bedside commode(3-in-1 commode)  Anticipated Discharge Disposition (OT): home with home health  Plan of Care Review  Plan of Care Reviewed With: patient  Plan of Care Reviewed With:  patient  Outcome Summary: Pt. is 78 y/o female admit for weakness, falls, and severe sepsis. Pt. presents w/ general deconditioning this date, demonstrates ability to ambulate to and from bathroom to complete toileting ADL w/ supervision for safety + setup assist. Pt. states she feel at home secondary to neglecting use of AD. Pt. safe to d/c home w/ intermittent support/assist + HH therapy to address home safety deficits and mitigate falls risk. Pt. will also require 3-in-1 commode at d/c. Will follow up w/ pt. 3x per week at [facility].        Time Calculation:   Time Calculation- OT     Row Name 01/06/20 1557             Time Calculation- OT    OT Start Time  1340  -MP      OT Stop Time  1405  -MP      OT Time Calculation (min)  25 min  -MP      Total Timed Code Minutes- OT  16 minute(s)  -MP      OT Received On  01/06/20  -      OT - Next Appointment  01/08/20  -      OT Goal Re-Cert Due Date  01/20/20  -        User Key  (r) = Recorded By, (t) = Taken By, (c) = Cosigned By    Initials Name Provider Type    Ryan Sheehan OT Occupational Therapist        Therapy Charges for Today     Code Description Service Date Service Provider Modifiers Qty    83211130171 HC OT EVAL LOW COMPLEXITY 3 1/6/2020 Ryan Barrios OT GO 1    52879019003 HC OT THERAPEUTIC ACT EA 15 MIN 1/6/2020 Ryan Barrios OT GO 1    14767204864 HC OT SELF CARE/MGMT/TRAIN EA 15 MIN 1/6/2020 Ryan Barrios OT GO 1               Ryan Barrios OT  1/6/2020

## 2020-01-06 NOTE — PLAN OF CARE
Problem: Patient Care Overview  Goal: Plan of Care Review  Outcome: Ongoing (interventions implemented as appropriate)  Flowsheets  Taken 1/6/2020 0776  Progress: no change  Outcome Summary: Pt. is 78 y/o female admit for weakness, falls, and severe sepsis. Pt. presents w/ general deconditioning this date, demonstrates ability to ambulate to and from bathroom to complete toileting ADL w/ supervision for safety + setup assist. Pt. states she feel at home secondary to neglecting use of AD. Pt. safe to d/c home w/ intermittent support/assist + HH therapy to address home safety deficits and mitigate falls risk. Pt. will also require 3-in-1 commode at d/c. Will follow up w/ pt. 3x per week at [facility].  Taken 1/6/2020 1400  Plan of Care Reviewed With: patient

## 2020-01-06 NOTE — PLAN OF CARE
Problem: Patient Care Overview  Goal: Plan of Care Review  Outcome: Ongoing (interventions implemented as appropriate)  Flowsheets (Taken 1/6/2020 1015)  Plan of Care Reviewed With: patient  Outcome Summary: 78 yo female adm for weakness, falls, severe sepsis. Pt presents w/. significant weakness. She is not consistent w/ use of rw & is unsteady w/o using it. Has already had one injurious fall. Recommend IP rehab at d/c to address weakness & balance to prevent further injurious falls.

## 2020-01-06 NOTE — PROGRESS NOTES
"Pharmacy Antimicrobial Dosing Service    Subjective:  Catalina Moreno is a 79 y.o.female with sepsis of unknown source. Broad-spectrum abx started for temp of 101, LA 2.4, WBC of 10.9. Patient presented with altered mental status.     1/3 Blood: NGTD (pending)   1/4 Blood: NGTD (pending)   Respiratory panel: negative   1/4 UA: negative       Assessment/Plan    1. Day #3 Vancomycin: Goal -600 mcg*h/mL. Patient received 1500 mg (~20 mg/kg ABW) IV x1 followed by 1000 mg (~14 mg/kg ABW) IV q24h. Peak today = 25.2 mcg/mL (drawn appropriately). Trough scheduled for today at 2200. Will re-evaluate regimen once trough results. Ordered BMP for today to assess renal function as none has been collected since 1/4.     2. Day #3 Cefepime: 2g IV q12h for estCrCl 30-59 mL/min.    Will continue to monitor drug levels, renal function, culture and sensitivities, and patient clinical status.       Objective:  Relevant clinical data and objective history reviewed:  162.6 cm (64\")   73.6 kg (162 lb 4.1 oz)   Ideal body weight: 54.7 kg (120 lb 9.5 oz)  Adjusted ideal body weight: 62.3 kg (137 lb 4.1 oz)  Body mass index is 27.85 kg/m².    Results from last 7 days   Lab Units 01/06/20  0234   VANCOMYCIN PK mcg/mL 25.20     Results from last 7 days   Lab Units 01/04/20  0818 01/03/20  2112   CREATININE mg/dL 0.93 1.06*     Estimated Creatinine Clearance: 49 mL/min (by C-G formula based on SCr of 0.93 mg/dL).  I/O last 3 completed shifts:  In: 1880 [P.O.:1080; IV Piggyback:800]  Out: 2901 [Urine:2900; Stool:1]    Results from last 7 days   Lab Units 01/04/20  1348 01/04/20  0818 01/03/20  2112   WBC 10*3/mm3 9.90 10.90* 6.30     Temperature    01/05/20 1407 01/05/20 2100 01/06/20 0608   Temp: 97.7 °F (36.5 °C) 97.8 °F (36.6 °C) 97.9 °F (36.6 °C)     Baseline culture/source/susceptibility:  Microbiology Results (last 10 days)       Procedure Component Value - Date/Time    Blood Culture - Blood, Arm, Left [[id number]] Collected:  " Report to RACHEL Lane., RN care transferred. 01/04/20 0031    Lab Status:  Preliminary result Specimen:  Blood from Arm, Left Updated:  01/06/20 0045     Blood Culture No growth at 2 days    Respiratory Panel, PCR - Swab, Nasopharynx [[id number]]  (Normal) Collected:  01/04/20 0017    Lab Status:  Final result Specimen:  Swab from Nasopharynx Updated:  01/04/20 0246     ADENOVIRUS, PCR Not Detected     Coronavirus 229E Not Detected     Coronavirus HKU1 Not Detected     Coronavirus NL63 Not Detected     Coronavirus OC43 Not Detected     Human Metapneumovirus Not Detected     Human Rhinovirus/Enterovirus Not Detected     Influenza B PCR Not Detected     Parainfluenza Virus 1 Not Detected     Parainfluenza Virus 2 Not Detected     Parainfluenza Virus 3 Not Detected     Parainfluenza Virus 4 Not Detected     Bordetella pertussis pcr Not Detected     Influenza A H1 2009 PCR Not Detected     Chlamydophila pneumoniae PCR Not Detected     Mycoplasma pneumo by PCR Not Detected     Influenza A PCR Not Detected     Influenza A H3 Not Detected     Influenza A H1 Not Detected     RSV, PCR Not Detected    Blood Culture - Blood, Arm, Right [[id number]] Collected:  01/03/20 2340    Lab Status:  Preliminary result Specimen:  Blood from Arm, Right Updated:  01/06/20 0000     Blood Culture No growth at 2 days             Anti-Infectives (From admission, onward)      Ordered     Dose/Rate Route Frequency Start Stop    01/04/20 1536  !Vancomycin Level Draw Needed     Ordering Provider:  Clifton Adam MD     Does not apply Once 01/06/20 2200      01/04/20 1536  !Vancomycin Level Draw Needed     Ordering Provider:  Clifton Adam MD     Does not apply Once 01/06/20 0300 01/06/20 0408    01/04/20 1326  vancomycin (VANCOCIN) 1,000 mg in sodium chloride 0.9 % 250 mL IVPB  Review   Ordering Provider:  Mark Rios MD    1,000 mg Intravenous Every 24 Hours 01/04/20 2300 01/10/20 2259    01/04/20 1526  cefepime 2 gm IVPB in 100 ml NS (MBP)  Review   Ordering Provider:  Irving Oh  MD JENNY    2 g  over 30 Minutes Intravenous Every 12 Hours 01/04/20 1800 01/11/20 2359    01/04/20 0546  ceFEPime (MAXIPIME) in SWFI 2g/10ml IV PUSH syringe     Note to Pharmacy:  Had dose in ED   Ordering Provider:  Melania Jo APRN    2 g  over 5 Minutes Intravenous Once 01/04/20 0548 01/04/20 0555    01/04/20 0546  Pharmacy to dose vancomycin  Review   Ordering Provider:  Melania Jo APRN     Does not apply Continuous PRN 01/04/20 0546 01/09/20 0545    01/03/20 2249  vancomycin IVPB 1500 mg in 0.9% NaCl (Premix) 250 mL     Ordering Provider:  Mark Rios MD    1,500 mg Intravenous Once 01/03/20 2330 01/04/20 0501    01/03/20 2249  ceFEPime (MAXIPIME) in SWFI 2g/10ml IV PUSH syringe     Ordering Provider:  Mark Rios MD    2 g  over 5 Minutes Intravenous Once 01/03/20 2251 01/04/20 0044            Leilani Casas, [place]ma  01/06/20 7:44 AM

## 2020-01-06 NOTE — PLAN OF CARE
Problem: Patient Care Overview  Goal: Plan of Care Review  Outcome: Ongoing (interventions implemented as appropriate)  Flowsheets (Taken 1/6/2020 0422)  Progress: improving  Plan of Care Reviewed With: patient  Outcome Summary: Patient is experiencing a lot of pain and the patients legs are very restless.  The patient has taken several walks with stand by assist, her restless legs seem be more calm after the walk.  The patient is able to communicate her needs though she gets confused at times.  PT is to evaluate the patient in the morning (1/6/20) per family request due to the patients recent falls and weakness.  Will continue to monitor.

## 2020-01-06 NOTE — PROGRESS NOTES
[facility] Medicine Services Daily Progress Note      Hospitalist Team  LOS 0 days      Patient Care Team:  Anayeli Costa APRN as PCP - General (Family Medicine)    Patient Location: 201/1      Subjective  Pain controlled, will get PT eval  Subjective     Chief Complaint / Subjective  Chief Complaint   Patient presents with   • Fall       Present on Admission:  • Altered mental status  • Hypertension  • Gastroesophageal reflux disease  • Hypothyroidism  • Lumbar radiculopathy      Brief Synopsis of Hospital Course/HPI  Ms. Moreno is a 79 y.o.  White female with multiple medical problems, presented to [facility] with mental status change, patient was brought to ER by grandson with increase agitation and confusion, symtoms started this am, apparently was seen at outlying facility and diagnosed with UTI, she was started on po Keflex, She took one dose, patient was given ativan in ER, and admitted to [facility].     At bedside, patient is nonverbal rocking in the bed, moving all extremities, airway intact.  Patient lives by herself per grandson.  There is no known febrile sick contacts    Date: c/o severe back pain, has had it for over a year, mental status back to baseline   initially not wanting NH, now agreeable to rehab, will consult PT/OT    Review of Systems   Constitution: Negative.   HENT: Negative.    Eyes: Negative.    Cardiovascular: Negative.    Respiratory: Positive for cough.    Endocrine: Negative.    Hematologic/Lymphatic: Negative.    Skin: Negative.    Musculoskeletal: Positive for back pain.   Gastrointestinal: Positive for diarrhea.   Genitourinary: Negative.    Neurological: Negative.    Psychiatric/Behavioral: Negative.    Allergic/Immunologic: Negative.    All other systems reviewed and are negative.    Objective   Objective      Vital Signs  Temp:  [97.7 °F (36.5 °C)-97.9 °F (36.6 °C)] 97.9 °F (36.6 °C)  Heart Rate:  [85-94] 85  Resp:  [16] 16  BP:  "()/(57-67) 140/67  Oxygen Therapy  SpO2: 96 %  Pulse Oximetry Type: Intermittent  Device (Oxygen Therapy): room air  Flow (L/min): 3  Flowsheet Rows      First Filed Value   Admission Height  162.6 cm (64\") Documented at 01/03/2020 1954   Admission Weight  72.6 kg (160 lb 0.9 oz) Documented at 01/03/2020 1954        Intake & Output (last 3 days)       01/03 0701 - 01/04 0700 01/04 0701 - 01/05 0700 01/05 0701 - 01/06 0700 01/06 0701 - 01/07 0700    P.O.   1080     I.V. (mL/kg)  1000 (13.6)      IV Piggyback 1250  800     Total Intake(mL/kg) 1250 (17.2) 1000 (13.6) 1880 (25.5)     Urine (mL/kg/hr)  1050 (0.6) 1850 (1)     Stool   1     Total Output  1050 1851     Net +1250 -50 +29             Urine Unmeasured Occurrence  3 x 2 x         Lines, Drains & Airways    Active LDAs     Name:   Placement date:   Placement time:   Site:   Days:    Peripheral IV 01/03/20 2202 Left Forearm   01/03/20 2202    Forearm   1                Physical Exam   Constitutional: She is oriented to person, place, and time. She appears well-developed and well-nourished. No distress.   HENT:   Head: Normocephalic and atraumatic.   Mouth/Throat: No oropharyngeal exudate.   Eyes: Pupils are equal, round, and reactive to light. Conjunctivae and EOM are normal. Right eye exhibits no discharge. Left eye exhibits no discharge. No scleral icterus.   Neck: Normal range of motion. No JVD present. No tracheal deviation present. No thyromegaly present.   Cardiovascular: Normal rate, regular rhythm, normal heart sounds and intact distal pulses. Exam reveals no gallop and no friction rub.   No murmur heard.  Pulmonary/Chest: Effort normal and breath sounds normal. No stridor. No respiratory distress. She has no wheezes. She has no rales. She exhibits no tenderness.   Abdominal: Soft. Bowel sounds are normal. She exhibits no distension and no mass. There is no tenderness. There is no rebound and no guarding. No hernia.   Musculoskeletal: Normal " range of motion. She exhibits no edema, tenderness or deformity.   Back pain   Lymphadenopathy:     She has no cervical adenopathy.   Neurological: She is alert and oriented to person, place, and time. No cranial nerve deficit or sensory deficit. She exhibits normal muscle tone. Coordination normal.   Skin: Skin is warm and dry. No rash noted. She is not diaphoretic. No erythema.   Psychiatric: She has a normal mood and affect. Her behavior is normal.   Nursing note and vitals reviewed.        Procedures:      Results Review:     I reviewed the patient's new clinical results.      Lab Results (last 24 hours)     Procedure Component Value Units Date/Time    Vancomycin, Peak [[id number]]  (Normal) Collected:  01/06/20 0234    Specimen:  Blood Updated:  01/06/20 0350     Vancomycin Peak 25.20 mcg/mL     Folate [[id number]] Collected:  01/06/20 0234    Specimen:  Blood Updated:  01/06/20 0323    Blood Culture - Blood, Arm, Left [[id number]] Collected:  01/04/20 0031    Specimen:  Blood from Arm, Left Updated:  01/06/20 0045     Blood Culture No growth at 2 days    Blood Culture - Blood, Arm, Right [[id number]] Collected:  01/03/20 2340    Specimen:  Blood from Arm, Right Updated:  01/06/20 0000     Blood Culture No growth at 2 days        No results found for: HGBA1C  Results from last 7 days   Lab Units 01/03/20  2340   INR  1.18*       Results from last 7 days   Lab Units 01/03/20  2324   PH, ARTERIAL pH units 7.522*   PO2 ART mm Hg 69.1*   PCO2, ARTERIAL mm Hg 31.0*   HCO3 ART mmol/L 25.5         Microbiology Results (last 10 days)     Procedure Component Value - Date/Time    Blood Culture - Blood, Arm, Left [[id number]] Collected:  01/04/20 0031    Lab Status:  Preliminary result Specimen:  Blood from Arm, Left Updated:  01/06/20 0045     Blood Culture No growth at 2 days    Respiratory Panel, PCR - Swab, Nasopharynx [[id number]]  (Normal) Collected:  01/04/20 0017    Lab Status:  Final result Specimen:  Swab from  Nasopharynx Updated:  01/04/20 0246     ADENOVIRUS, PCR Not Detected     Coronavirus 229E Not Detected     Coronavirus HKU1 Not Detected     Coronavirus NL63 Not Detected     Coronavirus OC43 Not Detected     Human Metapneumovirus Not Detected     Human Rhinovirus/Enterovirus Not Detected     Influenza B PCR Not Detected     Parainfluenza Virus 1 Not Detected     Parainfluenza Virus 2 Not Detected     Parainfluenza Virus 3 Not Detected     Parainfluenza Virus 4 Not Detected     Bordetella pertussis pcr Not Detected     Influenza A H1 2009 PCR Not Detected     Chlamydophila pneumoniae PCR Not Detected     Mycoplasma pneumo by PCR Not Detected     Influenza A PCR Not Detected     Influenza A H3 Not Detected     Influenza A H1 Not Detected     RSV, PCR Not Detected    Blood Culture - Blood, Arm, Right [[id number]] Collected:  01/03/20 2340    Lab Status:  Preliminary result Specimen:  Blood from Arm, Right Updated:  01/06/20 0000     Blood Culture No growth at 2 days          ECG/EMG Results (most recent)     Procedure Component Value Units Date/Time    ECG 12 Lead [[id number]] Collected:  01/03/20 2258     Updated:  01/05/20 0648    Narrative:       HEART RATE= 107  bpm  RR Interval= 562  ms  LA Interval= 373  ms  P Horizontal Axis= 193  deg  P Front Axis= 0  deg  QRSD Interval= 137  ms  QT Interval= 247  ms  QRS Axis= 47  deg  T Wave Axis= 251  deg  - ABNORMAL ECG -  Atrial-ventricular dual-paced complexes  When compared with ECG of 12-Jan-2019 6:04:32,  Significant change in rhythm  Electronically Signed By: Mark Rios ([place]) 05-Jan-2020 06:48:30  Date and Time of Study: 2020-01-03 22:58:05          Results for orders placed in visit on 07/15/19   SCANNED VASCULAR STUDIES            Ct Head Without Contrast    Result Date: 1/4/2020  1. No acute intracranial abnormality is identified. 2. Mild generalized brain volume loss and small vessel ischemic changes. Miguel Treadwell M.D. Neuroradiologist Electronically signed  by:  Miguel Treadwell M.D.  1/4/2020 12:40 AM    Mri Lumbar Spine Without Contrast    Result Date: 1/5/2020  IMPRESSION :  1. Grade 1 anterior spondylolisthesis of L4 on L5. 2. Degenerative changes. 3. Borderline canal stenosis at L3-L4. There is neural foraminal narrowing without nerve root impingement. 4. Level by level findings are described in detail above.  Electronically Signed By-Jona Jacobs On:1/5/2020 6:05 PM This report was finalized on [id number] by  Jona Jacobs, .    Xr Chest 1 View    Result Date: 1/4/2020   Cardiomegaly with pulmonary vascular congestion.  No focal airspace consolidation or pleural effusion.   Electronically Signed By-Alvino Treadwell On:1/4/2020 8:23 AM This report was finalized on [id number] by  Alvino Treadwell, .      Xrays, labs reviewed personally by physician.    Medication Review:   I have reviewed the patient's current medication list      Scheduled Meds    !Vancomycin Level Draw Needed  Does not apply Once   cefepime 2 g Intravenous Q12H   gabapentin 300 mg Oral Q8H   lactulose 30 g Oral Daily   sodium chloride 30 mL/kg Intravenous Once   vancomycin 1,000 mg Intravenous Q24H       Meds Infusions    Pharmacy to dose vancomycin        Meds PRN  HYDROcodone-acetaminophen  •  HYDROcodone-acetaminophen  •  ipratropium-albuterol  •  Pharmacy to dose vancomycin  •  potassium chloride **OR** potassium chloride **OR** potassium chloride  •  [COMPLETED] Insert peripheral IV **AND** sodium chloride        Assessment/Plan   Assessment/Plan     Active Hospital Problems    Diagnosis  POA   • **Altered mental status [R41.82]  Yes   • Lumbar radiculopathy [M54.16]  Yes   • Hypertension [I10]  Yes   • Gastroesophageal reflux disease [K21.9]  Yes   • Hypothyroidism [E03.9]  Yes      Resolved Hospital Problems    Diagnosis Date Resolved POA   • Sepsis (CMS/HCC) [A41.9] 01/05/2020 Unknown         Active Hospital Problems:  * Altered mental status- (present on admission)  Likely secondary to  polypharmacy, chronic back pain on narcotics, ? infectious    on IV abx dc in am  BC (-)x1day  UA-(-)  procal neg      Lumbar radiculopathy- (present on admission)  C/o severe back pain, take pain meds 4 x day at home  S/p L4 surgery, last MRI 6 month ago  Will recheck     Hypothyroidism- (present on admission)  Continue synthroid   nl TSH     Hypertension- (present on admission)   Hold bp meds  Monitor BP    Gastroesophageal reflux disease- (present on admission)  protonix q day     Falls- get pt eval      Resolved Hospital Problems:  Sepsis (CMS/HCC)-resolved as of 1/5/2020  ? Source]   IV abx started in ER  Follow Blood culture  Urine culture  IV fluids    CXR and UA negative  WBC 6.3-10.9  T 101  Lactic acid 2.4        Consult pt/ot >placement      VTE Prophylaxis - SCDs.      Code Status -   Code Status and Medical Interventions:   Ordered at: 01/04/20 0550     Code Status:    CPR     Medical Interventions (Level of Support Prior to Arrest):    Full       Discharge Planning    Destination      Coordination has not been started for this encounter.      Durable Medical Equipment      Coordination has not been started for this encounter.      Dialysis/Infusion      Coordination has not been started for this encounter.      Home Medical Care      Coordination has not been started for this encounter.      Therapy      Coordination has not been started for this encounter.      Community Resources      Coordination has not been started for this encounter.            Electronically signed by Bryant Aldridge Jr., MD, 01/06/20, 7:30 AM.  [facility] Hospitalist Team

## 2020-01-06 NOTE — DISCHARGE PLACEMENT REQUEST
"Catalina Moreno (79 y.o. Female)     Date of Birth Social Security Number Address Home Phone MRN    1940  [address] [place] [facility] [phone] [id number]    Sikh Marital Status          None        Admission Date Admission Type Admitting Provider Attending Provider Department, Room/Bed    1/3/20 Emergency Bryant Aldridge Jr., MD [place], Lawrence Jr., MD [address] PEDIATRICS, 201/1    Discharge Date Discharge Disposition Discharge Destination                       Attending Provider:  Bryant Aldridge Jr., MD    Allergies:  Baclofen, Codeine, Ibuprofen, Methocarbamol, Naproxen, Tizanidine Hcl, Tolmetin    Isolation:  None   Infection:  None   Code Status:  CPR    Ht:  162.6 cm (64\")   Wt:  73.6 kg (162 lb 4.1 oz)    Admission Cmt:  None   Principal Problem:  Altered mental status [R41.82] More...                 Active Insurance as of 1/3/2020     Primary Coverage     Payor Plan Insurance Group Employer/Plan Group    HUMANA MEDICARE REPLACEMENT HUMANA MEDICARE REPLACEMENT [id number]     Payor Plan Address Payor Plan Phone Number Payor Plan Fax Number Effective Dates    [address] [address] [phone]  1/1/2020 - None Entered    [facility] [address]       Subscriber Name Subscriber Birth Date Member ID       DEANDRECATALINA TANIA 1940 [id number]                 Emergency Contacts      (Rel.) Home Phone Work Phone Mobile Phone    DEANDRE KLEBER (Relative) [phone] -- [phone]    Jaki Moreno (Grandchild) [phone] -- [phone]            Emergency Contact Information     Name Relation Home Work Mobile    KLEBER MORENO Relative [phone]    MorenoJaki Grandchild [phone]          Insurance Information                HUMANA MEDICARE REPLACEMENT/HUMANA MEDICARE REPLACEMENT Phone: [phone]    Subscriber: Catalina Moreno Subscriber#: [id number]    Group#: [id number] Precert#:           "

## 2020-01-06 NOTE — PROGRESS NOTES
Continued Stay Note   [facility]     Patient Name: Catalina Moreno  MRN: [id number]  Today's Date: 1/6/2020    Admit Date: 1/3/2020    Discharge Plan     Row Name 01/06/20 1520       Plan    Plan  DC Plan: [facility] accepted-pending precert. PASRR approved. Precert started 1/6.    Row Name 01/06/20 1255                                                           Expected Discharge Date and Time     Expected Discharge Date Expected Discharge Time    Jan 8, 2020         [facility]ATY Moses    Phone # [phone]  Cell #[phone]  Fax#[phone]  [email or web address]      KATY Moses

## 2020-01-06 NOTE — PROGRESS NOTES
Discharge Planning Assessment  [facility]     Patient Name: Catalina Moreon  MRN: [id number]  Today's Date: 1/6/2020    Admit Date: 1/3/2020    Discharge Needs Assessment     Row Name 01/06/20 1254       Living Environment    Lives With  alone    Current Living Arrangements  home/apartment/condo    Primary Care Provided by  self    Provides Primary Care For  no one    Family Caregiver if Needed  grandchild(evangelista), adult    Able to Return to Prior Arrangements  yes       Resource/Environmental Concerns    Resource/Environmental Concerns  none    Transportation Concerns  car, none       Transition Planning    Patient/Family Anticipates Transition to  inpatient rehabilitation facility    Patient/Family Anticipated Services at Transition  skilled nursing    Transportation Anticipated  family or friend will provide       Discharge Needs Assessment    Readmission Within the Last 30 Days  no previous admission in last 30 days    Concerns to be Addressed  discharge planning    Equipment Currently Used at Home  walker, rolling    Anticipated Changes Related to Illness  none    Equipment Needed After Discharge  none        Discharge Plan     Row Name 01/06/20 1255       Plan    Plan  OT eval pending - Anticipate inpatient rehab, acceptance pending at [facility], will need Precert, see MSW for PASRR.     Provided post acute provider list?  Yes    Post Acute Provider Lists  Inpatient Rehab    Post Acuite Provider List  Delivered    Delivered To  Patient    Method of Delivery  In person    Patient/Family in Agreement with Plan  yes    Plan Comments  Met with patient at bedside, delivered IM letter. Patient's first choice is [facility], updated. Barrier: Blood cultures pending.         Destination      Service Provider Request Status Selected Services Address Phone Number Fax Number    [facility] Pending - Request Sent N/A [address] [place] [address] [phone]        Expected Discharge Date  and Time     Expected Discharge Date Expected Discharge Time    Jan 8, 2020         Demographic Summary     Row Name 01/06/20 1252       General Information    Admission Type  inpatient    Arrived From  home    Required Notices Provided  Important Message from Medicare    Referral Source  admission list    Reason for Consult  discharge planning        Functional Status     Row Name 01/06/20 1254       Functional Status    Usual Activity Tolerance  moderate    Current Activity Tolerance  fair       Functional Status, IADL    Medications  independent    Meal Preparation  independent    Housekeeping  independent    Laundry  independent    Shopping  assistive person       Mental Status    General Appearance WDL  WDL       Mental Status Summary    Recent Changes in Mental Status/Cognitive Functioning  no changes        Patient Forms     Row Name 01/06/20 1253       Patient Forms    Important Message from Medicare (IMM)  Delivered    Delivered to  Patient    Method of delivery  In person          Leilani Lainez  [phone]

## 2020-01-06 NOTE — THERAPY EVALUATION
Patient Name: Catalina Moreno  : 1940    MRN: [id number]                              Today's Date: 2020       Admit Date: 1/3/2020    Visit Dx:     ICD-10-CM ICD-9-CM   1. Altered mental status, unspecified altered mental status type R41.82 780.97   2. Severe sepsis (CMS/HCC) A41.9 038.9    R65.20 995.92     Patient Active Problem List   Diagnosis   • Arthritis   • Neuropathic pain   • Other long term (current) drug therapy   • Polyarthralgia   • Sacroiliitis (CMS/HCC)   • Depressive disorder   • Primary fibromyalgia syndrome   • Gastroesophageal reflux disease   • Hypertension   • Lightheadedness   • Hypothyroidism   • Lumbar radiculopathy   • Altered mental status     Past Medical History:   Diagnosis Date   • Arthritis    • Atrial fibrillation (CMS/HCC)    • Broken shoulder     left-- due to fall 19 was at [facility]   • Buttock pain     rt side   • Depression    • GERD (gastroesophageal reflux disease)    • H/O fall    • Hip pain     rt   • Hypertension    • Hypothyroidism    • Leg pain     rt   • Low back pain    • Occipital bone fracture (CMS/HCC)     left- due to fall   19 was inpt at [facility]     Past Surgical History:   Procedure Laterality Date   • BACK SURGERY  2018    PDC &  PSF L3-L5 insitu   • CHOLECYSTECTOMY     • HYSTERECTOMY     • ROTATOR CUFF REPAIR Left      General Information     Row Name 20 1009          PT Evaluation Time/Intention    Document Type  evaluation  -CM     Mode of Treatment  physical therapy  -CM     Row Name 20 1009          General Information    Patient Profile Reviewed?  yes 78 yo female adm 1/3/2020 following fall at home. Dx w/ severe sepsis, confusion, recent uti.   -CM     Prior Level of Function  independent:;gait pt reports she uses rw at home, but then states she fell because she didn't have her rw w/ her  -CM     Existing Precautions/Restrictions  fall  -CM     Barriers to Rehab  none identified  -CM     Row Name 20 3153           Relationship/Environment    Lives With  alone grandjonny checks in but lives 15-20 min away per pt  -CM     Row Name 01/06/20 1009          Resource/Environmental Concerns    Current Living Arrangements  home/apartment/condo  -CM     Row Name 01/06/20 1009          Home Main Entrance    Number of Stairs, Main Entrance  none  -CM     Row Name 01/06/20 1009          Stairs Within Home, Primary    Number of Stairs, Within Home, Primary  none  -CM     Row Name 01/06/20 1009          Cognitive Assessment/Intervention- PT/OT    Orientation Status (Cognition)  oriented x 4  -CM     Cognitive Assessment/Intervention Comment  pleasant, agreeable, intermittent confusion  -CM     Row Name 01/06/20 1009          Safety Issues, Functional Mobility    Safety Issues Affecting Function (Mobility)  insight into deficits/self awareness  -CM     Impairments Affecting Function (Mobility)  strength;balance  -CM       User Key  (r) = Recorded By, (t) = Taken By, (c) = Cosigned By    Initials Name Provider Type    CM Ora Calderón, PT Physical Therapist        Mobility     Row Name 01/06/20 1013          Bed Mobility Assessment/Treatment    Bed Mobility Assessment/Treatment  bed mobility (all) activities;supine-sit  -CM     [place] Level (Bed Mobility)  independent  -CM     Supine-Sit [place] (Bed Mobility)  independent  -CM     Row Name 01/06/20 1013          Sit-Stand Transfer    Sit-Stand [place] (Transfers)  contact guard needs cues to push up from bed; tries to pull up on rw to stand  -CM     Assistive Device (Sit-Stand Transfers)  walker, front-wheeled  -CM     Row Name 01/06/20 1013          Gait/Stairs Assessment/Training    Gait/Stairs Assessment/Training  gait/ambulation independence;gait/ambulation assistive device  -CM     [place] Level (Gait)  supervision  -CM     Assistive Device (Gait)  walker, front-wheeled  -CM     Distance in Feet (Gait)  90 ft x 2  -CM     Pattern (Gait)  step-through  -CM      Deviations/Abnormal Patterns (Gait)  base of support, narrow;bilateral deviations mild lateral sway  -CM       User Key  (r) = Recorded By, (t) = Taken By, (c) = Cosigned By    Initials Name Provider Type    Oar Tony, PT Physical Therapist        Obj/Interventions     Row Name 01/06/20 1014          General ROM    GENERAL ROM COMMENTS  shoulders limited 50%; all other joint mobility wfl.  Presents w/ large knot on L lateral humerus, w/ bruising noted  -CM     Row Name 01/06/20 1014          MMT (Manual Muscle Testing)    General MMT Comments  UEs 3/5; LEs 3+/5  -CM     Row Name 01/06/20 1014          Static Sitting Balance    Level of [place] (Unsupported Sitting, Static Balance)  independent  -CM     Sitting Position (Unsupported Sitting, Static Balance)  sitting on edge of bed  -[facility] Name 01/06/20 1014          Dynamic Sitting Balance    Level of [place], Reaches Outside Midline (Sitting, Dynamic Balance)  supervision  -CM     Sitting Position, Reaches Outside Midline (Sitting, Dynamic Balance)  sitting on edge of bed  -CM     Row Name 01/06/20 1014          Static Standing Balance    Level of [place] (Supported Standing, Static Balance)  supervision  -CM     Time Able to Maintain Position (Supported Standing, Static Balance)  more than 5 minutes  -CM     Assistive Device Utilized (Supported Standing, Static Balance)  walker, rolling  -CM     [facility] Name 01/06/20 1014          Dynamic Standing Balance    Level of [place], Reaches Outside Midline (Standing, Dynamic Balance)  contact guard assist  -CM     Assistive Device Utilized (Supported Standing, Dynamic Balance)  walker, rolling  -CM     Row Name 01/06/20 1014          Sensory Assessment/Intervention    Sensory General Assessment  no sensation deficits identified  -CM       User Key  (r) = Recorded By, (t) = Taken By, (c) = Cosigned By    Initials Name Provider Type    Ora Tony, PT Physical Therapist         Goals/Plan     Row Name 01/06/20 1018          Transfer Goal 1 (PT)    Activity/Assistive Device (Transfer Goal 1, PT)  transfers, all;walker, rolling  -CM     [place] Level/Cues Needed (Transfer Goal 1, PT)  conditional independence  -CM     Row Name 01/06/20 1018          Gait Training Goal 1 (PT)    Activity/Assistive Device (Gait Training Goal 1, PT)  gait (walking locomotion);walker, rolling  -CM     [place] Level (Gait Training Goal 1, PT)  conditional independence  -CM     Distance (Gait Goal 1, PT)  150 ft w/o loss of balance  -CM     Row Name 01/06/20 1018          ROM Goal 1 (PT)    ROM Goal 1 (PT)  Completes 25 reps of arom w/o undue fatigue  -CM     Time Frame (ROM Goal 1, PT)  2 weeks  -CM       User Key  (r) = Recorded By, (t) = Taken By, (c) = Cosigned By    Initials Name Provider Type    Ora Tony, PT Physical Therapist        Clinical Impression     Row Name 01/06/20 1015          Pain Assessment    Additional Documentation  Pain Scale: Numbers Pre/Post-Treatment (Group)  -CM     Row Name 01/06/20 1015          Pain Scale: Numbers Pre/Post-Treatment    Pain Scale: Numbers, Pretreatment  0/10 - no pain  -CM     Pain Scale: Numbers, Post-Treatment  0/10 - no pain  -CM     Pre/Post Treatment Pain Comment  c/o chronic back pain but has had pain meds and is painfree at this time  -CM     Pain Intervention(s)  Medication (See MAR);Repositioned;Emotional support  -CM     Row Name 01/06/20 1015          Plan of Care Review    Plan of Care Reviewed With  patient  -CM     Outcome Summary  80 yo female adm for weakness, falls, severe sepsis. Pt presents w/. significant weakness. She is not consistent w/ use of rw & is unsteady w/o using it. Has already had one injurious fall. Recommend IP rehab at d/c to address weakness & balance to prevent further injurious falls.   -CM     Row Name 01/06/20 1015          Physical Therapy Clinical Impression    Patient/Family Goals Statement (PT  Clinical Impression)  78 yo female adm for weakness, falls, severe sepsis. Pt presents w/. significant weakness. She is not consistent w/ use of rw & is unsteady w/o using it. Has already had one injurious fall. Recommend IP rehab at d/c to address weakness & balance to prevent further injurious falls.   -CM     Criteria for Skilled Interventions Met (PT Clinical Impression)  yes;no problems identified which require skilled intervention  -CM     Rehab Potential (PT Clinical Summary)  good, to achieve stated therapy goals  -CM     Predicted Duration of Therapy (PT)  until d/c  -CM     Row Name 01/06/20 1015          Positioning and Restraints    Pre-Treatment Position  in bed  -CM     Post Treatment Position  chair  -CM     In Chair  notified nsg;sitting;call light within reach;encouraged to call for assist;exit alarm on  -CM       User Key  (r) = Recorded By, (t) = Taken By, (c) = Cosigned By    Initials Name Provider Type    Ora Tony, PT Physical Therapist        Outcome Measures     Row Name 01/06/20 1019          How much help from another person do you currently need...    Turning from your back to your side while in flat bed without using bedrails?  4  -CM     Moving from lying on back to sitting on the side of a flat bed without bedrails?  4  -CM     Moving to and from a bed to a chair (including a wheelchair)?  3  -CM     Standing up from a chair using your arms (e.g., wheelchair, bedside chair)?  3  -CM     Climbing 3-5 steps with a railing?  2  -CM     To walk in hospital room?  3  -CM     AM-PAC 6 Clicks Score (PT)  19  -CM     Row Name 01/06/20 1019          Functional Assessment    Outcome Measure Options  AM-PAC 6 Clicks Basic Mobility (PT)  -CM       User Key  (r) = Recorded By, (t) = Taken By, (c) = Cosigned By    Initials Name Provider Type    Ora Tony, PT Physical Therapist          PT Recommendation and Plan  Planned Therapy Interventions (PT Eval): balance training, gait  training, motor coordination training, patient/family education, postural re-education, strengthening, transfer training  Outcome Summary/Treatment Plan (PT)  Anticipated Discharge Disposition (PT): inpatient rehabilitation facility  Plan of Care Reviewed With: patient  Outcome Summary: 78 yo female adm for weakness, falls, severe sepsis. Pt presents w/. significant weakness. She is not consistent w/ use of rw & is unsteady w/o using it. Has already had one injurious fall. Recommend IP rehab at d/c to address weakness & balance to prevent further injurious falls.      Time Calculation:   PT Charges     Row Name 01/06/20 1021 01/06/20 1020          Time Calculation    Start Time  --  0934  -CM     Stop Time  --  1000  -CM     Time Calculation (min)  --  26 min  -CM     PT Received On  --  01/06/20  -CM     PT - Next Appointment  --  01/08/20  -CM     PT Goal Re-Cert Due Date  --  01/20/20  -CM        Time Calculation- PT    Total Timed Code Minutes- PT  10 minute(s)  -CM  16 minute(s)  -CM       User Key  (r) = Recorded By, (t) = Taken By, (c) = Cosigned By    Initials Name Provider Type    CM Ora Calderón, PT Physical Therapist        Therapy Charges for Today     Code Description Service Date Service Provider Modifiers Qty    68892463719 HC PT EVAL LOW COMPLEXITY 3 1/6/2020 Ora Calderón, PT GP 1    88410362534 HC GAIT TRAINING EA 15 MIN 1/6/2020 Ora Calderón, PT GP 1          PT G-Codes  Outcome Measure Options: AM-PAC 6 Clicks Basic Mobility (PT)  AM-PAC 6 Clicks Score (PT): 19    Ora Calderón PT  1/6/2020

## 2020-01-07 ENCOUNTER — OFFICE VISIT (OUTPATIENT)
Dept: PAIN MEDICINE | Facility: CLINIC | Age: 80
End: 2020-01-07

## 2020-01-07 VITALS
SYSTOLIC BLOOD PRESSURE: 129 MMHG | RESPIRATION RATE: 17 BRPM | OXYGEN SATURATION: 97 % | WEIGHT: 162.26 LBS | TEMPERATURE: 97.9 F | HEART RATE: 74 BPM | HEIGHT: 64 IN | DIASTOLIC BLOOD PRESSURE: 65 MMHG | BODY MASS INDEX: 27.7 KG/M2

## 2020-01-07 VITALS
HEART RATE: 79 BPM | SYSTOLIC BLOOD PRESSURE: 151 MMHG | WEIGHT: 153 LBS | BODY MASS INDEX: 26.12 KG/M2 | HEIGHT: 64 IN | TEMPERATURE: 97.9 F | RESPIRATION RATE: 16 BRPM | OXYGEN SATURATION: 99 % | DIASTOLIC BLOOD PRESSURE: 80 MMHG

## 2020-01-07 DIAGNOSIS — M79.2 NEUROPATHIC PAIN: ICD-10-CM

## 2020-01-07 DIAGNOSIS — M25.50 POLYARTHRALGIA: ICD-10-CM

## 2020-01-07 DIAGNOSIS — G89.4 CHRONIC PAIN SYNDROME: Primary | ICD-10-CM

## 2020-01-07 DIAGNOSIS — M48.062 LUMBAR STENOSIS WITH NEUROGENIC CLAUDICATION: ICD-10-CM

## 2020-01-07 DIAGNOSIS — M96.1 POSTLAMINECTOMY SYNDROME OF LUMBAR REGION: ICD-10-CM

## 2020-01-07 DIAGNOSIS — Z79.899 HIGH RISK MEDICATION USE: ICD-10-CM

## 2020-01-07 LAB
ALBUMIN SERPL-MCNC: 3.5 G/DL (ref 3.5–5.2)
ALBUMIN/GLOB SERPL: 0.9 G/DL
ALP SERPL-CCNC: 65 U/L (ref 39–117)
ALT SERPL W P-5'-P-CCNC: 18 U/L (ref 1–33)
ANION GAP SERPL CALCULATED.3IONS-SCNC: 13 MMOL/L (ref 5–15)
AST SERPL-CCNC: 28 U/L (ref 1–32)
BASOPHILS # BLD AUTO: 0.1 10*3/MM3 (ref 0–0.2)
BASOPHILS NFR BLD AUTO: 1.3 % (ref 0–1.5)
BILIRUB SERPL-MCNC: 0.4 MG/DL (ref 0.2–1.2)
BUN BLD-MCNC: 17 MG/DL (ref 8–23)
BUN/CREAT SERPL: 22.4 (ref 7–25)
CALCIUM SPEC-SCNC: 8.7 MG/DL (ref 8.6–10.5)
CHLORIDE SERPL-SCNC: 104 MMOL/L (ref 98–107)
CO2 SERPL-SCNC: 23 MMOL/L (ref 22–29)
CREAT BLD-MCNC: 0.76 MG/DL (ref 0.57–1)
DEPRECATED RDW RBC AUTO: 49.4 FL (ref 37–54)
EOSINOPHIL # BLD AUTO: 0.5 10*3/MM3 (ref 0–0.4)
EOSINOPHIL NFR BLD AUTO: 7.8 % (ref 0.3–6.2)
ERYTHROCYTE [DISTWIDTH] IN BLOOD BY AUTOMATED COUNT: 15.3 % (ref 12.3–15.4)
GFR SERPL CREATININE-BSD FRML MDRD: 74 ML/MIN/1.73
GLOBULIN UR ELPH-MCNC: 3.8 GM/DL
GLUCOSE BLD-MCNC: 122 MG/DL (ref 65–99)
HCT VFR BLD AUTO: 28.2 % (ref 34–46.6)
HGB BLD-MCNC: 9.5 G/DL (ref 12–15.9)
LYMPHOCYTES # BLD AUTO: 2.2 10*3/MM3 (ref 0.7–3.1)
LYMPHOCYTES NFR BLD AUTO: 36.9 % (ref 19.6–45.3)
MCH RBC QN AUTO: 30.4 PG (ref 26.6–33)
MCHC RBC AUTO-ENTMCNC: 33.5 G/DL (ref 31.5–35.7)
MCV RBC AUTO: 90.8 FL (ref 79–97)
MONOCYTES # BLD AUTO: 0.5 10*3/MM3 (ref 0.1–0.9)
MONOCYTES NFR BLD AUTO: 9 % (ref 5–12)
NEUTROPHILS # BLD AUTO: 2.6 10*3/MM3 (ref 1.7–7)
NEUTROPHILS NFR BLD AUTO: 45 % (ref 42.7–76)
NRBC BLD AUTO-RTO: 0.1 /100 WBC (ref 0–0.2)
PLATELET # BLD AUTO: 228 10*3/MM3 (ref 140–450)
PMV BLD AUTO: 7.6 FL (ref 6–12)
POTASSIUM BLD-SCNC: 3.8 MMOL/L (ref 3.5–5.2)
PROT SERPL-MCNC: 7.3 G/DL (ref 6–8.5)
RBC # BLD AUTO: 3.11 10*6/MM3 (ref 3.77–5.28)
SODIUM BLD-SCNC: 140 MMOL/L (ref 136–145)
WBC NRBC COR # BLD: 5.8 10*3/MM3 (ref 3.4–10.8)

## 2020-01-07 PROCEDURE — 97530 THERAPEUTIC ACTIVITIES: CPT

## 2020-01-07 PROCEDURE — 99238 HOSP IP/OBS DSCHRG MGMT 30/<: CPT | Performed by: INTERNAL MEDICINE

## 2020-01-07 PROCEDURE — 80053 COMPREHEN METABOLIC PANEL: CPT | Performed by: INTERNAL MEDICINE

## 2020-01-07 PROCEDURE — 99214 OFFICE O/P EST MOD 30 MIN: CPT | Performed by: ANESTHESIOLOGY

## 2020-01-07 PROCEDURE — 85025 COMPLETE CBC W/AUTO DIFF WBC: CPT | Performed by: INTERNAL MEDICINE

## 2020-01-07 PROCEDURE — G0463 HOSPITAL OUTPT CLINIC VISIT: HCPCS | Performed by: ANESTHESIOLOGY

## 2020-01-07 PROCEDURE — 97535 SELF CARE MNGMENT TRAINING: CPT

## 2020-01-07 RX ORDER — HYDROCODONE BITARTRATE AND ACETAMINOPHEN 10; 325 MG/1; MG/1
1 TABLET ORAL 3 TIMES DAILY PRN
Qty: 120 TABLET | Refills: 0 | Status: SHIPPED | OUTPATIENT
Start: 2020-01-07 | End: 2020-01-07 | Stop reason: SDUPTHER

## 2020-01-07 RX ORDER — HYDROCODONE BITARTRATE AND ACETAMINOPHEN 10; 325 MG/1; MG/1
1 TABLET ORAL 3 TIMES DAILY PRN
Qty: 90 TABLET | Refills: 0 | Status: SHIPPED | OUTPATIENT
Start: 2020-01-07 | End: 2020-02-04 | Stop reason: SDUPTHER

## 2020-01-07 RX ADMIN — HYDROCODONE BITARTRATE AND ACETAMINOPHEN 1 TABLET: 10; 325 TABLET ORAL at 11:24

## 2020-01-07 RX ADMIN — GABAPENTIN 300 MG: 300 CAPSULE ORAL at 05:46

## 2020-01-07 RX ADMIN — HYDROCODONE BITARTRATE AND ACETAMINOPHEN 1 TABLET: 10; 325 TABLET ORAL at 03:37

## 2020-01-07 RX ADMIN — GABAPENTIN 300 MG: 300 CAPSULE ORAL at 14:37

## 2020-01-07 NOTE — THERAPY TREATMENT NOTE
Acute Care - Occupational Therapy Treatment Note   Gabriel     Patient Name: Catalina Moreno  : 1940  MRN: [id number]  Today's Date: 2020             Admit Date: 1/3/2020       ICD-10-CM ICD-9-CM   1. Altered mental status, unspecified altered mental status type R41.82 780.97   2. Severe sepsis (CMS/HCC) A41.9 038.9    R65.20 995.92     Patient Active Problem List   Diagnosis   • Arthritis   • Neuropathic pain   • Other long term (current) drug therapy   • Polyarthralgia   • Sacroiliitis (CMS/HCC)   • Depressive disorder   • Primary fibromyalgia syndrome   • Gastroesophageal reflux disease   • Hypertension   • Lightheadedness   • Hypothyroidism   • Lumbar radiculopathy   • Altered mental status     Past Medical History:   Diagnosis Date   • Arthritis    • Atrial fibrillation (CMS/HCC)    • Broken shoulder     left-- due to fall 19 was at [facility]   • Buttock pain     rt side   • Depression    • GERD (gastroesophageal reflux disease)    • H/O fall    • Hip pain     rt   • Hypertension    • Hypothyroidism    • Leg pain     rt   • Low back pain    • Occipital bone fracture (CMS/HCC)     left- due to fall   19 was inpt at [facility]     Past Surgical History:   Procedure Laterality Date   • BACK SURGERY  2018    PDC &  PSF L3-L5 insitu   • CHOLECYSTECTOMY     • HYSTERECTOMY     • ROTATOR CUFF REPAIR Left        Therapy Treatment    Rehabilitation Treatment Summary     Row Name 20 1100             Treatment Time/Intention    Discipline  occupational therapist  -AL      Document Type  therapy note (daily note)  -AL      Subjective Information  -- states she feels she needs ip rehab d/t weakness  -AL      Mode of Treatment  occupational therapy  -AL      Patient Effort  good  -AL      Recorded by [AL] Little Boyd OT 20 1202      Row Name 20 1100             Functional Mobility    Functional Mobility- Comment  walked in room/bathroom w/rw w/ sba. fatigued after simple adl tasks.   -AL      Recorded by [AL] Little Boyd, OT 01/07/20 1202      Row Name 01/07/20 1100             Sit-Stand Transfer    Sit-Stand [place] (Transfers)  supervision  -AL      Assistive Device (Sit-Stand Transfers)  walker, front-wheeled  -AL      Recorded by [AL] Little Boyd, OT 01/07/20 1202      Row Name 01/07/20 1100             ADL Assessment/Intervention    BADL Assessment/Intervention  lower body dressing;grooming  -AL      Recorded by [AL] Little Boyd, OT 01/07/20 1202      Row Name 01/07/20 1100             Lower Body Dressing Assessment/Training    Lower Body Dressing [place] Level  lower body dressing skills;supervision  -AL      Recorded by [AL] Little Boyd, OT 01/07/20 1202      Row Name 01/07/20 1100             Grooming Assessment/Training    [place] Level (Grooming)  grooming skills;supervision  -AL      Grooming Position  sink side  -AL      Recorded by [AL] Little Boyd, OT 01/07/20 1202      Row Name 01/07/20 1100             Toileting Assessment/Training    [place] Level (Toileting)  toileting skills;supervision  -AL      Recorded by [AL] Little Boyd, OT 01/07/20 1202      Row Name 01/07/20 1100             Positioning and Restraints    Pre-Treatment Position  in bed  -AL      Post Treatment Position  chair  -AL      In Chair  sitting;call light within reach;encouraged to call for assist  -AL      Recorded by [AL] Little Boyd, OT 01/07/20 1202      Row Name 01/07/20 1100             Plan of Care Review    Plan of Care Reviewed With  patient  -AL      Progress  improving  -AL      Outcome Summary  pt participates well and motivated to improve her functional status. completing badls/adl mobility w/ supervision, needs occasional cues for safety, as she tends to step away from AD prior to completing tranfers. still fatiguing quickly. will cont to follow per poc.pt now plans to dc to ip rehab d/t remaining weakness, which is appropriate, considering pt is  alone at home.   -AL      Recorded by [AL] Little Boyd OT 01/07/20 1202      Row Name 01/07/20 1100             Outcome Summary/Treatment Plan (OT)    Anticipated Discharge Disposition (OT)  skilled nursing facility  -AL      Recorded by [AL] Little Boyd OT 01/07/20 1202        User Key  (r) = Recorded By, (t) = Taken By, (c) = Cosigned By    Initials Name Effective Dates Discipline    AL Little Boyd OT 03/01/19 -  OT                 OT Recommendation and Plan  Outcome Summary/Treatment Plan (OT)  Anticipated Discharge Disposition (OT): skilled nursing facility  Plan of Care Review  Plan of Care Reviewed With: patient  Plan of Care Reviewed With: patient  Outcome Summary: pt participates well and motivated to improve her functional status. completing badls/adl mobility w/ supervision, needs occasional cues for safety, as she tends to step away from AD prior to completing tranfers. still fatiguing quickly. will cont to follow per poc.pt now plans to dc to ip rehab d/t remaining weakness, which is appropriate, considering pt is alone at home.        Time Calculation:   Time Calculation- OT     Row Name 01/07/20 1155             Time Calculation- OT    OT Start Time  1115  -AL      OT Stop Time  1140  -AL      OT Time Calculation (min)  25 min  -AL      Total Timed Code Minutes- OT  25 minute(s)  -AL      OT Received On  01/07/20  -AL      OT - Next Appointment  01/07/20  -AL        User Key  (r) = Recorded By, (t) = Taken By, (c) = Cosigned By    Initials Name Provider Type    AL Little Boyd OT Occupational Therapist        Therapy Charges for Today     Code Description Service Date Service Provider Modifiers Qty    61687589937 HC OT SELF CARE/MGMT/TRAIN EA 15 MIN 1/7/2020 Little Boyd OT GO 2    25566619136 HC OT THERAPEUTIC ACT EA 15 MIN 1/7/2020 Little Boyd OT GO 1               Little Boyd OT  1/7/2020

## 2020-01-07 NOTE — PROGRESS NOTES
Continued Stay Note   Gabriel     Patient Name: Catalina Moreno  MRN: [id number]  Today's Date: 1/7/2020    Admit Date: 1/3/2020    Discharge Plan     Row Name 01/07/20 1414       Plan    Plan  DC Plan: Insurance denied precert for [facility]. [facility] approved. Peer to peer information given to MD Aldridge who states he will complete.    Plan Comments  MD to call [phone] Option 5 by noon EST 1/8/2020 to complete peer to peer. This information has been shared with MD Aldridge via secure message. Awaiting answer from MD regarding results of peer to peer. RN CM advised this SW that [facility] is now patient first choice per amadou. CINTHYA advised BRENDAN MARLEY that this is not an option as precert was started for [facility] and cannot be changed at this time. In addition [facility] does not take [employer].     Row Name 01/07/20 1402                         Discharge Codes    No documentation.       Expected Discharge Date and Time     Expected Discharge Date Expected Discharge Time    Jan 8, 2020         KATY Moses    Phone # [phone]  Cell #[phone]  Fax#[phone]  [email or web address]      KATY Moses

## 2020-01-07 NOTE — DISCHARGE SUMMARY
Date of Admission: 1/3/2020    Date of Discharge:  1/7/2020    Length of stay:  LOS: 1 day     Discharge Diagnosis: Altered mental status    Presenting Problem/History of Present Illness  Active Hospital Problems    Diagnosis  POA   • **Altered mental status [R41.82]  Yes   • Lumbar radiculopathy [M54.16]  Yes   • Hypertension [I10]  Yes   • Gastroesophageal reflux disease [K21.9]  Yes   • Hypothyroidism [E03.9]  Yes      Resolved Hospital Problems    Diagnosis Date Resolved POA   • Sepsis (CMS/HCC) [A41.9] 01/05/2020 Unknown          Hospital Course  Patient is a 79 y.o. female presented with altered mental status which resolved.Pt ambulating with PT and insurance denied inpatient rehab.Pt is being discharged home with home health      Past Medical History:     Past Medical History:   Diagnosis Date   • Arthritis    • Atrial fibrillation (CMS/HCC)    • Broken shoulder     left-- due to fall 11-7-19 was at [facility]   • Buttock pain     rt side   • Depression    • GERD (gastroesophageal reflux disease)    • H/O fall    • Hip pain     rt   • Hypertension    • Hypothyroidism    • Leg pain     rt   • Low back pain    • Occipital bone fracture (CMS/HCC)     left- due to fall   11-7-19 was inpt at [facility]       Past Surgical History:     Past Surgical History:   Procedure Laterality Date   • BACK SURGERY  07/19/2018    PDC &  PSF L3-L5 insitu   • CHOLECYSTECTOMY     • HYSTERECTOMY     • ROTATOR CUFF REPAIR Left        Social History:   Social History     Socioeconomic History   • Marital status:      Spouse name: Not on file   • Number of children: Not on file   • Years of education: Not on file   • Highest education level: Not on file   Tobacco Use   • Smoking status: Never Smoker   • Smokeless tobacco: Never Used   Substance and Sexual Activity   • Alcohol use: No   • Drug use: No   • Sexual activity: Defer       Procedures Performed         Consults:   Consults     Date and Time Order Name Status Description     1/4/2020 1329 Inpatient Infectious Diseases Consult      1/4/2020 0248 Inpatient Hospitalist Consult Completed           Pertinent Test Results:       Lab Results (most recent)     Procedure Component Value Units Date/Time    Comprehensive Metabolic Panel [[id number]]  (Abnormal) Collected:  01/07/20 0417    Specimen:  Blood Updated:  01/07/20 0555     Glucose 122 mg/dL      BUN 17 mg/dL      Creatinine 0.76 mg/dL      Sodium 140 mmol/L      Potassium 3.8 mmol/L      Chloride 104 mmol/L      CO2 23.0 mmol/L      Calcium 8.7 mg/dL      Total Protein 7.3 g/dL      Albumin 3.50 g/dL      ALT (SGPT) 18 U/L      AST (SGOT) 28 U/L      Alkaline Phosphatase 65 U/L      Total Bilirubin 0.4 mg/dL      eGFR Non African Amer 74 mL/min/1.73      Globulin 3.8 gm/dL      A/G Ratio 0.9 g/dL      BUN/Creatinine Ratio 22.4     Anion Gap 13.0 mmol/L     Narrative:       GFR Normal >60  Chronic Kidney Disease <60  Kidney Failure <15      CBC & Differential [[id number]] Collected:  01/07/20 0417    Specimen:  Blood Updated:  01/07/20 0523    Narrative:       The following orders were created for panel order CBC & Differential.  Procedure                               Abnormality         Status                     ---------                               -----------         ------                     CBC Auto Differential[[id number]]        Abnormal            Final result                 Please view results for these tests on the individual orders.    CBC Auto Differential [[id number]]  (Abnormal) Collected:  01/07/20 0417    Specimen:  Blood Updated:  01/07/20 0523     WBC 5.80 10*3/mm3      RBC 3.11 10*6/mm3      Hemoglobin 9.5 g/dL      Hematocrit 28.2 %      MCV 90.8 fL      MCH 30.4 pg      MCHC 33.5 g/dL      RDW 15.3 %      RDW-SD 49.4 fl      MPV 7.6 fL      Platelets 228 10*3/mm3      Neutrophil % 45.0 %      Lymphocyte % 36.9 %      Monocyte % 9.0 %      Eosinophil % 7.8 %      Basophil % 1.3 %      Neutrophils, Absolute 2.60  10*3/mm3      Lymphocytes, Absolute 2.20 10*3/mm3      Monocytes, Absolute 0.50 10*3/mm3      Eosinophils, Absolute 0.50 10*3/mm3      Basophils, Absolute 0.10 10*3/mm3      nRBC 0.1 /100 WBC     Blood Culture - Blood, Arm, Left [[id number]] Collected:  01/04/20 0031    Specimen:  Blood from Arm, Left Updated:  01/07/20 0045     Blood Culture No growth at 3 days    Blood Culture - Blood, Arm, Right [[id number]] Collected:  01/03/20 2340    Specimen:  Blood from Arm, Right Updated:  01/07/20 0000     Blood Culture No growth at 3 days    Folate [[id number]]  (Normal) Collected:  01/06/20 0234    Specimen:  Blood Updated:  01/06/20 1119     Folate >20.00 ng/mL     Basic Metabolic Panel [[id number]]  (Abnormal) Collected:  01/06/20 0234    Specimen:  Blood Updated:  01/06/20 0836     Glucose 126 mg/dL      BUN 21 mg/dL      Creatinine 0.94 mg/dL      Sodium 138 mmol/L      Potassium 3.8 mmol/L      Chloride 102 mmol/L      CO2 21.0 mmol/L      Calcium 8.5 mg/dL      eGFR Non African Amer 58 mL/min/1.73      BUN/Creatinine Ratio 22.3     Anion Gap 15.0 mmol/L     Narrative:       GFR Normal >60  Chronic Kidney Disease <60  Kidney Failure <15      Vancomycin, Peak [[id number]]  (Normal) Collected:  01/06/20 0234    Specimen:  Blood Updated:  01/06/20 0350     Vancomycin Peak 25.20 mcg/mL     Vitamin B12 [[id number]]  (Normal) Collected:  01/04/20 1348    Specimen:  Blood from Arm, Left Updated:  01/04/20 1836     Vitamin B-12 449 pg/mL     Sedimentation Rate [[id number]]  (Abnormal) Collected:  01/04/20 1348    Specimen:  Blood from Arm, Left Updated:  01/04/20 1502     Sed Rate 36 mm/hr     Procalcitonin [[id number]]  (Abnormal) Collected:  01/04/20 1348    Specimen:  Blood from Arm, Left Updated:  01/04/20 1433     Procalcitonin 0.06 ng/mL     Narrative:       As a Marker for Sepsis (Non-Neonates):   1. <0.5 ng/mL represents a low risk of severe sepsis and/or septic shock.  1. >2 ng/mL represents a high risk of severe sepsis  "and/or septic shock.    As a Marker for Lower Respiratory Tract Infections that require antibiotic therapy:  PCT on Admission     Antibiotic Therapy             6-12 Hrs later  > 0.5                Strongly Recommended            >0.25 - <0.5         Recommended  0.1 - 0.25           Discouraged                   Remeasure/reassess PCT  <0.1                 Strongly Discouraged          Remeasure/reassess PCT      As 28 day mortality risk marker: \"Change in Procalcitonin Result\" (> 80 % or <=80 %) if Day 0 (or Day 1) and Day 4 values are available. Refer to http://[email or web address] [email or web address]pct[facility][email or web address]/   Change in PCT <=80 %   A decrease of PCT levels below or equal to 80 % defines a positive change in PCT test result representing a higher risk for 28-day all-cause mortality of patients diagnosed with severe sepsis or septic shock.  Change in PCT > 80 %   A decrease of PCT levels of more than 80 % defines a negative change in PCT result representing a lower risk for 28-day all-cause mortality of patients diagnosed with severe sepsis or septic shock.                  BNP [[id number]]  (Abnormal) Collected:  01/04/20 1348    Specimen:  Blood from Arm, Left Updated:  01/04/20 1432     proBNP 5,704.0 pg/mL     Narrative:       Among patients with dyspnea, NT-proBNP is highly sensitive for the detection of acute congestive heart failure. In addition NT-proBNP of <300 pg/ml effectively rules out acute congestive heart failure with 99% negative predictive value.      TSH [[id number]]  (Normal) Collected:  01/04/20 1348    Specimen:  Blood from Arm, Left Updated:  01/04/20 1432     TSH 1.370 uIU/mL     C-reactive Protein [[id number]]  (Abnormal) Collected:  01/04/20 1348    Specimen:  Blood from Arm, Left Updated:  01/04/20 1428     C-Reactive Protein 0.63 mg/dL     Ammonia [[id number]]  (Normal) Collected:  01/04/20 1348    Specimen:  Blood from Arm, Left Updated:  01/04/20 1428     Ammonia 28 umol/L     CBC & Differential [[id number]] " Collected:  01/04/20 1348    Specimen:  Blood from Arm, Left Updated:  01/04/20 1405    Narrative:       The following orders were created for panel order CBC & Differential.  Procedure                               Abnormality         Status                     ---------                               -----------         ------                     CBC Auto Differential[[id number]]        Abnormal            Final result                 Please view results for these tests on the individual orders.    CBC Auto Differential [[id number]]  (Abnormal) Collected:  01/04/20 1348    Specimen:  Blood from Arm, Left Updated:  01/04/20 1405     WBC 9.90 10*3/mm3      RBC 3.50 10*6/mm3      Hemoglobin 10.3 g/dL      Hematocrit 31.7 %      MCV 90.6 fL      MCH 29.4 pg      MCHC 32.4 g/dL      RDW 15.6 %      RDW-SD 49.9 fl      MPV 7.3 fL      Platelets 286 10*3/mm3      Neutrophil % 77.0 %      Lymphocyte % 13.3 %      Monocyte % 9.3 %      Eosinophil % 0.1 %      Basophil % 0.3 %      Neutrophils, Absolute 7.60 10*3/mm3      Lymphocytes, Absolute 1.30 10*3/mm3      Monocytes, Absolute 0.90 10*3/mm3      Eosinophils, Absolute 0.00 10*3/mm3      Basophils, Absolute 0.00 10*3/mm3      nRBC 0.1 /100 WBC     Comprehensive Metabolic Panel [[id number]]  (Abnormal) Collected:  01/04/20 0818    Specimen:  Blood Updated:  01/04/20 0908     Glucose 116 mg/dL      BUN 32 mg/dL      Creatinine 0.93 mg/dL      Sodium 145 mmol/L      Potassium 3.1 mmol/L      Chloride 106 mmol/L      CO2 26.0 mmol/L      Calcium 9.0 mg/dL      Total Protein 8.0 g/dL      Albumin 4.00 g/dL      ALT (SGPT) 13 U/L      AST (SGOT) 33 U/L      Alkaline Phosphatase 79 U/L      Total Bilirubin 0.9 mg/dL      eGFR Non African Amer 58 mL/min/1.73      Globulin 4.0 gm/dL      A/G Ratio 1.0 g/dL      BUN/Creatinine Ratio 34.4     Anion Gap 13.0 mmol/L     Narrative:       GFR Normal >60  Chronic Kidney Disease <60  Kidney Failure <15      Urine Drug Screen - Urine, Clean  Catch [[id number]]  (Abnormal) Collected:  01/04/20 0021    Specimen:  Urine, Clean Catch Updated:  01/04/20 0731     Amphet/Methamphet, Screen Negative     Barbiturates Screen, Urine Negative     Benzodiazepine Screen, Urine Negative     Cocaine Screen, Urine Negative     Opiate Screen Positive     THC, Screen, Urine Negative     Methadone Screen, Urine Negative     Oxycodone Screen, Urine Negative    Narrative:       Negative Thresholds For Drugs Screened:     Amphetamines               500 ng/ml   Barbiturates               200 ng/ml   Benzodiazepines            100 ng/ml   Cocaine                    300 ng/ml   Methadone                  300 ng/ml   Opiates                    300 ng/ml   Oxycodone                  100 ng/ml   THC                        50 ng/ml    The Normal Value for all drugs tested is negative. This report includes final unconfirmed screening results to be used for medical treatment purposes only. Unconfirmed results must not be used for non-medical purposes such as employment or legal testing. Clinical consideration should be applied to any drug of abuse test, particulary when unconfirmed results are used.  All urine drugs of abuse requests without chain of custody are for medical screening purposes only.  False positives are possible.      Lactic Acid, Reflex [[id number]]  (Normal) Collected:  01/04/20 0510    Specimen:  Blood Updated:  01/04/20 0545     Lactate 1.1 mmol/L     Lactic Acid, Reflex Timer (This will reflex a repeat order 3-3:15 hours after ordered.) [[id number]] Collected:  01/03/20 2354    Specimen:  Blood Updated:  01/04/20 0300     Extra Tube Hold for add-ons.     Comment: Auto resulted.       Respiratory Panel, PCR - Swab, Nasopharynx [[id number]]  (Normal) Collected:  01/04/20 0017    Specimen:  Swab from Nasopharynx Updated:  01/04/20 0246     ADENOVIRUS, PCR Not Detected     Coronavirus 229E Not Detected     Coronavirus HKU1 Not Detected     Coronavirus NL63 Not Detected      Coronavirus OC43 Not Detected     Human Metapneumovirus Not Detected     Human Rhinovirus/Enterovirus Not Detected     Influenza B PCR Not Detected     Parainfluenza Virus 1 Not Detected     Parainfluenza Virus 2 Not Detected     Parainfluenza Virus 3 Not Detected     Parainfluenza Virus 4 Not Detected     Bordetella pertussis pcr Not Detected     Influenza A H1 2009 PCR Not Detected     Chlamydophila pneumoniae PCR Not Detected     Mycoplasma pneumo by PCR Not Detected     Influenza A PCR Not Detected     Influenza A H3 Not Detected     Influenza A H1 Not Detected     RSV, PCR Not Detected    Ammonia [[id number]]  (Abnormal) Collected:  01/03/20 2350    Specimen:  Blood Updated:  01/04/20 0054     Ammonia 54 umol/L     Extra Tubes [[id number]] Collected:  01/03/20 2340    Specimen:  Blood, Venous Line Updated:  01/04/20 0045    Narrative:       The following orders were created for panel order Extra Tubes.  Procedure                               Abnormality         Status                     ---------                               -----------         ------                     Green Top (Gel)[[id number]]                                  Final result                 Please view results for these tests on the individual orders.    Green Top (Gel) [[id number]] Collected:  01/03/20 2340    Specimen:  Blood Updated:  01/04/20 0045     Extra Tube Hold for add-ons.     Comment: Auto resulted.       Urinalysis With Culture If Indicated - Urine, Catheter [[id number]]  (Abnormal) Collected:  01/04/20 0021    Specimen:  Urine, Catheter Updated:  01/04/20 0040     Color, UA Yellow     Appearance, UA Clear     pH, UA 6.5     Specific Gravity, UA 1.015     Glucose, UA Negative     Ketones, UA Negative     Bilirubin, UA Negative     Blood, UA Negative     Protein, UA Trace     Leuk Esterase, UA Negative     Nitrite, UA Negative     Urobilinogen, UA 0.2 E.U./dL    Narrative:       Urine microscopic not indicated.    Protime-INR  [[id number]]  (Abnormal) Collected:  01/03/20 2340    Specimen:  Blood Updated:  01/03/20 2356     Protime 12.0 Seconds      INR 1.18    aPTT [[id number]]  (Normal) Collected:  01/03/20 2340    Specimen:  Blood Updated:  01/03/20 2356     PTT 26.6 seconds     POC Lactate [[id number]]  (Abnormal) Collected:  01/03/20 2354    Specimen:  Blood Updated:  01/03/20 2355     Lactate 2.4 mmol/L      Comment: Serial Number: [id number]:  [id number]       Troponin [[id number]]  (Normal) Collected:  01/03/20 2112    Specimen:  Blood Updated:  01/03/20 2336     Troponin T <0.010 ng/mL     Narrative:       Troponin T Reference Range:  <= 0.03 ng/mL-   Negative for AMI  >0.03 ng/mL-     Abnormal for myocardial necrosis.  Clinicians would have to utilize clinical acumen, EKG, Troponin and serial changes to determine if it is an Acute Myocardial Infarction or myocardial injury due to an underlying chronic condition.     Acetaminophen Level [[id number]]  (Abnormal) Collected:  01/03/20 2112    Specimen:  Blood Updated:  01/03/20 2336     Acetaminophen <5.0 mcg/mL     Narrative:       Acetaminophen Therapeutic Range  5-20 ug/mL      Hours after ingestion            Toxic Value    4 Hours                           150 ug/mL    8 Hours                            70 ug/mL   12 Hours                            40 ug/mL   16 Hours                            20 ug/mL    These values apply to a single ingestion only.     Salicylate Level [[id number]]  (Normal) Collected:  01/03/20 2112    Specimen:  Blood Updated:  01/03/20 2336     Salicylate <0.3 mg/dL     Hepatic Function Panel [[id number]]  (Abnormal) Collected:  01/03/20 2112    Specimen:  Blood Updated:  01/03/20 2336     Total Protein 9.5 g/dL      Albumin 5.00 g/dL      ALT (SGPT) 15 U/L      AST (SGOT) 41 U/L      Alkaline Phosphatase 101 U/L      Total Bilirubin 0.9 mg/dL      Bilirubin, Direct 0.2 mg/dL      Bilirubin, Indirect 0.7 mg/dL     CK [[id number]]  (Normal) Collected:   01/03/20 2112    Specimen:  Blood Updated:  01/03/20 2336     Creatine Kinase 135 U/L     Blood Gas, Arterial [[id number]]  (Abnormal) Collected:  01/03/20 2324    Specimen:  Arterial Blood Updated:  01/03/20 2326     Site Left Radial     Josue's Test Positive     pH, Arterial 7.522 pH units      pCO2, Arterial 31.0 mm Hg      pO2, Arterial 69.1 mm Hg      HCO3, Arterial 25.5 mmol/L      Base Excess, Arterial 3.2 mmol/L      Comment: Serial Number: [id number]:  [id number]        O2 Saturation, Arterial 95.6 %      CO2 Content 26.4 mmol/L      Barometric Pressure for Blood Gas --     Comment: N/A        Modality Room Air     FIO2 21 %      Hemodilution No    Basic Metabolic Panel [[id number]]  (Abnormal) Collected:  01/03/20 2112    Specimen:  Blood Updated:  01/03/20 2137     Glucose 150 mg/dL      BUN 30 mg/dL      Creatinine 1.06 mg/dL      Sodium 139 mmol/L      Potassium 3.6 mmol/L      Chloride 93 mmol/L      CO2 26.0 mmol/L      Calcium 10.0 mg/dL      eGFR Non African Amer 50 mL/min/1.73      BUN/Creatinine Ratio 28.3     Anion Gap 20.0 mmol/L     Narrative:       GFR Normal >60  Chronic Kidney Disease <60  Kidney Failure <15                    Imaging Results (Most Recent)     Procedure Component Value Units Date/Time    MRI Lumbar Spine Without Contrast [[id number]] Collected:  01/05/20 1758     Updated:  01/05/20 1808    Narrative:       DATE OF EXAM:  1/5/2020 5:20 PM     PROCEDURE:  MRI LUMBAR SPINE WO CONTRAST-     INDICATIONS:  Lumbar radiculopathy, difficulty walking, immunocompromised, previous  spinal surgery.     COMPARISON:  06/05/2019.     TECHNIQUE:   Routine magnetic resonance imaging of the lumbar spine was performed  without the administration of contrast.     There is grade 1 anterior spondylolisthesis of L4 on L5 measuring  approximately 5 mm. On the previous study, it measured 4 mm. There is  diminished disc height and signal throughout the lumbar spine indicating  mild degenerative disc  disease. There is spurring of the vertebral  endplates, most prominent at L1-L2, L2-L3, and L3-L4, consistent with  spondylosis. There is no marrow edema to indicate an acute bony  abnormality. The conus medullaris terminates at the mid L1 vertebral  level and is unremarkable. There is a posterior paraspinal scarring at  L4-L5. There is no definite paraspinal hematoma or fluid collection.     L5-S1:  There is facet arthritis and ligamentum flavum thickening. There  is no herniated disc. There is no central canal or neural foraminal  stenosis.     L4-L5:  There are facet hypertrophic degenerative changes without  ligamentum flavum thickening. There is a mild disc osteophyte complex.  There is no central canal stenosis. There is no neural foraminal  narrowing.     L3-L4:  There is a broad-based disc osteophyte complex with facet  hypertrophic degenerative changes and slight ligamentum flavum  thickening. There is fluid seen in the joint spaces similar to the  previous study. The canal is borderline stenotic. There is neural  foraminal narrowing without nerve root impingement.     L2-L3:  There is facet arthritis and ligamentum flavum thickening with  fluid in the joint spaces similar to the previous exam. There is a minor  disc osteophyte complex. There is no central canal or neural foraminal  stenosis.     L1-L2:  There is facet arthritis with slight ligamentum flavum  thickening with fluid in the joint spaces similar to the previous exam.  There is no herniated disc, canal, or neural foraminal stenosis.     T12-L1:  The facet joints are intact. There is no herniated disc, canal,  or neural foraminal stenosis.        Impression:       IMPRESSION :      1. Grade 1 anterior spondylolisthesis of L4 on L5.  2. Degenerative changes.  3. Borderline canal stenosis at L3-L4. There is neural foraminal  narrowing without nerve root impingement.  4. Level by level findings are described in detail above.     Electronically  Signed By-Jona Jacobs On:1/5/2020 6:05 PM  This report was finalized on [id number] by  Jona Jacobs, .    XR Chest 1 View [[id number]] Collected:  01/04/20 0821     Updated:  01/04/20 0825    Narrative:       XR CHEST 1 VW-     Date of Exam: 1/3/2020 11:32 PM     Indication: ams.     Comparison: 06/03/2019     Technique: A single view of the chest was obtained.     FINDINGS:      Cardiomegaly stable.  Pulmonary vessels are hazy and indistinct  consistent with pulmonary vascular congestion.  No focal airspace  consolidation identified.  No pleural effusions are identified.  The  bony structures appear within normal limits.             Impression:          Cardiomegaly with pulmonary vascular congestion.  No focal airspace  consolidation or pleural effusion.        Electronically Signed By-Alvino Treadwell On:1/4/2020 8:23 AM  This report was finalized on [id number] by  Alvino Treadwell, .    CT Head Without Contrast [[id number]] Collected:  01/04/20 0038     Updated:  01/04/20 0241    Narrative:       EXAMINATION: CT HEAD WITHOUT CONTRAST    DATE: 1/4/2020 1:35 AM    INDICATION: Trauma. Head injury. Headache.    COMPARISON: CT head 12/8/2019    TECHNIQUE: Noncontrast CT imaging through the brain was performed in the axial plane. CT dose lowering techniques were used, to include: automated exposure control, adjustment for patient size, and or use of iterative reconstruction.    FINDINGS:    Intracranial contents:    The ventricles and sulci are mildly prominent.  There is no midline shift or mass effect.  Allowing for motion on some images, no acute intracranial hemorrhage or abnormal extra-axial fluid collection is identified.    Gray-white interface appears distinct. Mild burden of ill-defined low density areas in the cerebral white matter bilaterally. Mild atherosclerotic calcification of the internal carotid arteries.    Bones and extracranial soft tissues:    The calvarium is intact. The visualized paranasal sinuses  and mastoid air cells are clear.        Impression:         1. No acute intracranial abnormality is identified.  2. Mild generalized brain volume loss and small vessel ischemic changes.      Miguel Treadwell M.D.  Neuroradiologist            Electronically signed by:  Miguel Treadwell M.D.    1/4/2020 12:40 AM          Condition on Discharge:  stable    Vital Signs  Temp:  [97.9 °F (36.6 °C)-98.1 °F (36.7 °C)] 97.9 °F (36.6 °C)  Heart Rate:  [73-82] 79  Resp:  [16-18] 16  BP: (106-151)/(52-80) 151/80    Physical Exam:     General Appearance:    Alert, cooperative, in no acute distress   Lungs:     Clear to auscultation,respirations regular, even and                  unlabored    Heart:    Regular rhythm and normal rate, normal S1 and S2, no            murmur, no gallop, no rub, no click   Abdomen:     Normal bowel sounds, no masses, no organomegaly, soft        non-tender, non-distended, no guarding, no rebound                tenderness   Extremities:   Moves all extremities well, no edema, no cyanosis, no             redness       Discharge Disposition  Home with home health    Discharge Medications     Discharge Medications      Continue These Medications      Instructions Start Date   cephalexin 500 MG capsule  Commonly known as:  KEFLEX   500 mg, Oral, 3 Times Daily      ELIQUIS 5 MG tablet tablet  Generic drug:  apixaban   5 mg, Oral, 2 Times Daily      famotidine 20 MG tablet  Commonly known as:  PEPCID   20 mg, Oral, 2 Times Daily      furosemide 20 MG tablet  Commonly known as:  LASIX   20 mg, Oral, Daily      gabapentin 600 MG tablet  Commonly known as:  NEURONTIN   600 mg, Oral, 3 Times Daily      HYDROcodone-acetaminophen  MG per tablet  Commonly known as:  NORCO   1 tablet, Oral, 4 Times Daily PRN      levothyroxine 75 MCG tablet  Commonly known as:  SYNTHROID, LEVOTHROID   75 mcg, Oral, Daily      metoprolol succinate XL 50 MG 24 hr tablet  Commonly known as:  TOPROL-XL   50 mg, Oral, Daily         pantoprazole 40 MG EC tablet  Commonly known as:  PROTONIX   40 mg, Oral, Daily         Stop These Medications    amitriptyline 50 MG tablet  Commonly known as:  ELAVIL            Discharge Diet:   Diet Instructions     Diet: Consistent Carbohydrate      Discharge Diet:  Consistent Carbohydrate          Activity at Discharge:   Activity Instructions     Gradually Increase Activity Until at Pre-Hospitalization Level            Follow-up Appointments PCP in one week  No future appointments.      Test Results Pending at Discharge   Order Current Status    Blood Culture - Blood, Arm, Left Preliminary result    Blood Culture - Blood, Arm, Right Preliminary result           Risk for Readmission (LACE) Score: 8 (1/7/2020  6:00 AM)          Bryant Aldridge Jr., MD  01/07/20  2:27 PM    Time:

## 2020-01-07 NOTE — DISCHARGE PLACEMENT REQUEST
"Catalina Moreno (79 y.o. Female)     Date of Birth Social Security Number Address Home Phone MRN    1940  [address] [facility] [place] [facility] [phone] [id number]    Christian Marital Status          None        Admission Date Admission Type Admitting Provider Attending Provider Department, Room/Bed    1/3/20 Emergency Bryant Aldridge Jr., MD [place], Lawrence Jr., MD [address] PEDIATRICS, 201/1    Discharge Date Discharge Disposition Discharge Destination         Home-Health Care [facility]              Attending Provider:  Bryant Aldridge Jr., MD    Allergies:  Baclofen, Codeine, Ibuprofen, Methocarbamol, Naproxen, Tizanidine Hcl, Tolmetin    Isolation:  None   Infection:  None   Code Status:  CPR    Ht:  162.6 cm (64\")   Wt:  73.6 kg (162 lb 4.1 oz)    Admission Cmt:  None   Principal Problem:  Altered mental status [R41.82] More...                 Active Insurance as of 1/3/2020     Primary Coverage     Payor Plan Insurance Group Employer/Plan Group    HUMANA MEDICARE REPLACEMENT HUMANA MEDICARE REPLACEMENT [id number]     Payor Plan Address Payor Plan Phone Number Payor Plan Fax Number Effective Dates    [address] [address] [phone]  1/1/2020 - None Entered    [facility] [address]       Subscriber Name Subscriber Birth Date Member ID       CATALINA MORENO 1940 [id number]                 Emergency Contacts      (Rel.) Home Phone Work Phone Mobile Phone    KLEBER MORENO (Relative) [phone] -- [phone]    Jaki Moreno (Grandchild) [phone] -- [phone]            Emergency Contact Information     Name Relation Home Work Mobile    KLEBER MORENO Relative [phone]    Jaki Moreno Grandchild [phone]          Insurance Information                HUMANA MEDICARE REPLACEMENT/HUMANA MEDICARE REPLACEMENT Phone: [phone]    Subscriber: Catalina Moreno Subscriber#: [id number]    Group#: [id number] " Precert#:              History & Physical      Irving Oh MD at 20 1318                [facility] Medicine Services      Patient Name: Catalina Moreno  : 1940  MRN: [id number]  Primary Care Physician: Anayeli Costa APRN  Date of admission: 1/3/2020    Patient Care Team:  Anayeli Costa APRN as PCP - General (Family Medicine)    Subjective   History Present Illness     Chief Complaint:   Chief Complaint   Patient presents with   • Fall     Ms. Moreno is a 79 y.o.  White female with multiple medical problems, presented to [facility] with mental status change, patient was brought to ER by grandson with increase agitation and confusion, symtoms started this am, apparently was seen at outlying facility and diagnosed with UTI, she was started on po Keflex, She took one dose, patient was given ativan in ER, and admitted to [facility].    At bedside, patient is nonverbal rocking in the bed, moving all extremities, airway intact.  Patient lives by herself per grandson.  There is no known febrile sick contacts.       Personal History     Past Medical History:   Past Medical History:   Diagnosis Date   • Arthritis    • Atrial fibrillation (CMS/HCC)    • Broken shoulder     left-- due to fall 19 was at [facility]   • Buttock pain     rt side   • Depression    • GERD (gastroesophageal reflux disease)    • H/O fall    • Hip pain     rt   • Hypertension    • Hypothyroidism    • Leg pain     rt   • Low back pain    • Occipital bone fracture (CMS/HCC)     left- due to fall   19 was inpt at [facility]       Surgical History:      Past Surgical History:   Procedure Laterality Date   • BACK SURGERY  2018    PDC &  PSF L3-L5 insitu   • CHOLECYSTECTOMY     • HYSTERECTOMY     • ROTATOR CUFF REPAIR Left            Family History: family history includes Cancer in her father, paternal aunt, and paternal uncle; Diabetes in her son; Heart disease in her paternal aunt. Otherwise  pertinent FHx was reviewed and unremarkable.     Social History:  reports that she has never smoked. She has never used smokeless tobacco. She reports that she does not drink alcohol or use drugs.      Medications:  Prior to Admission medications    Medication Sig Start Date End Date Taking? Authorizing Provider   amitriptyline (ELAVIL) 50 MG tablet Take 50 mg by mouth every night at bedtime. 12/4/19  Yes Elton Flores MD   cephalexin (KEFLEX) 500 MG capsule Take 500 mg by mouth 3 (Three) Times a Day. 1/3/20 1/13/20 Yes Elton Flores MD   ELIQUIS 5 MG tablet tablet Take 5 mg by mouth 2 (Two) Times a Day. 4/16/19  Yes Elton Flores MD   famotidine (PEPCID) 20 MG tablet Take 20 mg by mouth 2 (Two) Times a Day. 6/19/19  Yes Elton Flores MD   furosemide (LASIX) 20 MG tablet Take 20 mg by mouth Daily. 6/27/19  Yes Elton Flores MD   gabapentin (NEURONTIN) 600 MG tablet Take 600 mg by mouth 3 (Three) Times a Day.   Yes Elton Flores MD   HYDROcodone-acetaminophen (NORCO)  MG per tablet Take 1 tablet by mouth 4 (Four) Times a Day As Needed for Moderate Pain . 12/5/19  Yes Shefali Worthy MD   levothyroxine (SYNTHROID, LEVOTHROID) 75 MCG tablet Take 75 mcg by mouth Daily. 6/24/19  Yes Elton Flores MD   metoprolol succinate XL (TOPROL-XL) 50 MG 24 hr tablet Take 50 mg by mouth Daily.   Yes Elton Flores MD   pantoprazole (PROTONIX) 40 MG EC tablet Take 40 mg by mouth Daily. 6/24/19  Yes Elton Flores MD       Allergies:    Allergies   Allergen Reactions   • Baclofen Rash   • Codeine Nausea Only   • Ibuprofen Unknown (See Comments)     Patient doesn't know----Motin   • Methocarbamol Unknown (See Comments)     Patient doesn't know   • Naproxen Unknown (See Comments)     Pt. Doesn't know    • Tizanidine Hcl Hallucinations   • Tolmetin Unknown (See Comments)     Pt. Doesn't know-- same as Tolectin       Objective   Objective     Vital  Signs  Temp:  [97.5 °F (36.4 °C)-98.5 °F (36.9 °C)] 97.7 °F (36.5 °C)  Heart Rate:  [76-98] 89  Resp:  [14-22] 16  BP: (105-167)/(52-71) 105/57  SpO2:  [97 %-100 %] 98 %  on  Flow (L/min):  [2.5-3.5] 3;   Device (Oxygen Therapy): room air  Body mass index is 27.85 kg/m².    Physical Exam   Constitutional: She appears well-developed and well-nourished. No distress.   HENT:   Head: Normocephalic and atraumatic.   Mouth/Throat: No oropharyngeal exudate.   Eyes: Pupils are equal, round, and reactive to light. Conjunctivae and EOM are normal. Right eye exhibits no discharge. Left eye exhibits no discharge. No scleral icterus.   Neck: Normal range of motion. No JVD present. No tracheal deviation present. No thyromegaly present.   Cardiovascular: Normal rate, regular rhythm, normal heart sounds and intact distal pulses. Exam reveals no gallop and no friction rub.   No murmur heard.  Pulmonary/Chest: Effort normal and breath sounds normal. No stridor. No respiratory distress. She has no wheezes. She has no rales. She exhibits no tenderness.   Abdominal: Soft. Bowel sounds are normal. She exhibits no distension and no mass. There is no tenderness. There is no rebound and no guarding. No hernia.   Musculoskeletal: Normal range of motion. She exhibits no edema, tenderness or deformity.   Lymphadenopathy:     She has no cervical adenopathy.   Neurological: No cranial nerve deficit or sensory deficit. She exhibits normal muscle tone. Coordination normal.   Skin: Skin is warm and dry. No rash noted. She is not diaphoretic. No erythema.   Psychiatric: She has a normal mood and affect. Her behavior is normal.   Nursing note and vitals reviewed.      Results Review:  I have personally reviewed most recent lab results and agree with findings,    Results from last 7 days   Lab Units 01/04/20  1348  01/03/20  2340   WBC 10*3/mm3 9.90   < >  --    HEMOGLOBIN g/dL 10.3*   < >  --    HEMATOCRIT % 31.7*   < >  --    PLATELETS 10*3/mm3 286    < >  --    INR   --   --  1.18*    < > = values in this interval not displayed.     Results from last 7 days   Lab Units 01/04/20  1348 01/04/20  0818 01/04/20  0510  01/03/20  2112   SODIUM mmol/L  --  145  --   --  139   POTASSIUM mmol/L  --  3.1*  --   --  3.6   CHLORIDE mmol/L  --  106  --   --  93*   CO2 mmol/L  --  26.0  --   --  26.0   BUN mg/dL  --  32*  --   --  30*   CREATININE mg/dL  --  0.93  --   --  1.06*   GLUCOSE mg/dL  --  116*  --   --  150*   CALCIUM mg/dL  --  9.0  --   --  10.0   ALT (SGPT) U/L  --  13  --   --  15   AST (SGOT) U/L  --  33*  --   --  41*   TROPONIN T ng/mL  --   --   --   --  <0.010   PROBNP pg/mL 5,704.0*  --   --   --   --    LACTATE mmol/L  --   --  1.1   < >  --    PROCALCITONIN ng/mL 0.06*  --   --   --   --     < > = values in this interval not displayed.     Estimated Creatinine Clearance: 49 mL/min (by C-G formula based on SCr of 0.93 mg/dL).  Brief Urine Lab Results  (Last result in the past 365 days)      Color   Clarity   Blood   Leuk Est   Nitrite   Protein   CREAT   Urine HCG        01/04/20 0021 Yellow Clear Negative Negative Negative Trace               Microbiology Results (last 10 days)     Procedure Component Value - Date/Time    Blood Culture - Blood, Arm, Left [[id number]] Collected:  01/04/20 0031    Lab Status:  Preliminary result Specimen:  Blood from Arm, Left Updated:  01/05/20 0045     Blood Culture No growth at 24 hours    Respiratory Panel, PCR - Swab, Nasopharynx [[id number]]  (Normal) Collected:  01/04/20 0017    Lab Status:  Final result Specimen:  Swab from Nasopharynx Updated:  01/04/20 0246     ADENOVIRUS, PCR Not Detected     Coronavirus 229E Not Detected     Coronavirus HKU1 Not Detected     Coronavirus NL63 Not Detected     Coronavirus OC43 Not Detected     Human Metapneumovirus Not Detected     Human Rhinovirus/Enterovirus Not Detected     Influenza B PCR Not Detected     Parainfluenza Virus 1 Not Detected     Parainfluenza Virus 2 Not  Detected     Parainfluenza Virus 3 Not Detected     Parainfluenza Virus 4 Not Detected     Bordetella pertussis pcr Not Detected     Influenza A H1 2009 PCR Not Detected     Chlamydophila pneumoniae PCR Not Detected     Mycoplasma pneumo by PCR Not Detected     Influenza A PCR Not Detected     Influenza A H3 Not Detected     Influenza A H1 Not Detected     RSV, PCR Not Detected    Blood Culture - Blood, Arm, Right [[id number]] Collected:  01/03/20 2340    Lab Status:  Preliminary result Specimen:  Blood from Arm, Right Updated:  01/05/20 0001     Blood Culture No growth at 24 hours          ECG/EMG Results (most recent)     Procedure Component Value Units Date/Time    ECG 12 Lead [[id number]] Collected:  01/03/20 2258     Updated:  01/05/20 0648    Narrative:       HEART RATE= 107  bpm  RR Interval= 562  ms  NJ Interval= 373  ms  P Horizontal Axis= 193  deg  P Front Axis= 0  deg  QRSD Interval= 137  ms  QT Interval= 247  ms  QRS Axis= 47  deg  T Wave Axis= 251  deg  - ABNORMAL ECG -  Atrial-ventricular dual-paced complexes  When compared with ECG of 12-Jan-2019 6:04:32,  Significant change in rhythm  Electronically Signed By: Mark Rios ([place]) 05-Jan-2020 06:48:30  Date and Time of Study: 2020-01-03 22:58:05          Results for orders placed in visit on 07/15/19   SCANNED VASCULAR STUDIES            Ct Head Without Contrast    Result Date: 1/4/2020  1. No acute intracranial abnormality is identified. 2. Mild generalized brain volume loss and small vessel ischemic changes. Miguel Treadwell M.D. Neuroradiologist Electronically signed by:  Miguel Treadwell M.D.  1/4/2020 12:40 AM    Xr Chest 1 View    Result Date: 1/4/2020   Cardiomegaly with pulmonary vascular congestion.  No focal airspace consolidation or pleural effusion.   Electronically Signed By-Alvino Treadwell On:1/4/2020 8:23 AM This report was finalized on [id number] by  Alvino Treadwell, .        Estimated Creatinine Clearance: 49 mL/min (by C-G formula based  on SCr of 0.93 mg/dL).    Assessment/Plan   Assessment/Plan       Active Hospital Problems    Diagnosis  POA   • Altered mental status [R41.82]  Yes   • Sepsis (CMS/HCC) [A41.9]  Unknown   • Hypertension [I10]  Yes   • Gastroesophageal reflux disease [K21.9]  Yes   • Hypothyroidism [E03.9]  Yes      Resolved Hospital Problems   No resolved problems to display.       Active Hospital Problems:  Sepsis (CMS/HCC)  ? Source]   IV abx started in ER  Follow Blood culture  Urine culture  IV fluids    CXR and UA negative  WBC 6.3-10.9  T 101  Lactic acid 2.4      Altered mental status- (present on admission)  Likely secondary to infectious process    IV abx  IV fluids  BC   UA CS      Hypothyroidism- (present on admission)  Continue synthroid   Check TSH     Hypertension- (present on admission)   Hold bp meds  Monitor BP    Gastroesophageal reflux disease- (present on admission)  protonix q day     VTE Prophylaxis - SCDs.    CODE STATUS:    Code Status and Medical Interventions:   Ordered at: 01/04/20 0550     Code Status:    CPR     Medical Interventions (Level of Support Prior to Arrest):    Full       Admission Status:  I believe this patient meets impatient  criteria.      I discussed the patients findings and my recommendations with patient and family.        Electronically signed by Irving Oh MD, 01/04/20, 1:26 PM.  [facility] Hospitalist Team          Electronically signed by Irving Oh MD at 01/05/20 1455         Current Facility-Administered Medications   Medication Dose Route Frequency Provider Last Rate Last Dose   • gabapentin (NEURONTIN) capsule 300 mg  300 mg Oral Q8H Shukri Marinelli APRJENNY   300 mg at 01/07/20 1437   • HYDROcodone-acetaminophen (NORCO)  MG per tablet 1 tablet  1 tablet Oral Q6H PRN Irving Oh MD   1 tablet at 01/07/20 1124   • HYDROcodone-acetaminophen (NORCO) 5-325 MG per tablet 1 tablet  1 tablet Oral Q6H PRN Irving Oh MD   1 tablet at  01/05/20 1310   • ipratropium-albuterol (DUO-NEB) nebulizer solution 3 mL  3 mL Nebulization Q4H PRN Melania Jo APRN       • lactulose (CHRONULAC) 10 GM/15ML solution 30 g  30 g Oral Daily Melania Jo APRN   30 g at 01/06/20 0812   • potassium chloride (K-DUR,KLOR-CON) CR tablet 40 mEq  40 mEq Oral PRN Irving Oh MD   40 mEq at 01/05/20 0447    Or   • potassium chloride (KLOR-CON) packet 40 mEq  40 mEq Oral PRN Irving Oh MD        Or   • potassium chloride 10 mEq in 100 mL IVPB  10 mEq Intravenous Q1H PRN Irving Oh MD       • sodium chloride 0.9 % bolus 2,178 mL  30 mL/kg Intravenous Once Irving Oh MD       • sodium chloride 0.9 % flush 10 mL  10 mL Intravenous PRN Jesenia Love APRN   10 mL at 01/06/20 0812     Orders (last 24 hrs)      Start     Ordered    01/07/20 1427  Discharge patient  Once      01/07/20 1427    01/07/20 1051  Ok for pt to leave the unit for pain mgmt appt.  Nursing Communication  Once     Comments:  Ok for pt to leave the unit for pain mgmt appt.    01/07/20 1051    01/07/20 0600  CBC & Differential  Morning Draw      01/06/20 0733    01/07/20 0600  Comprehensive Metabolic Panel  Morning Draw      01/06/20 0733    01/07/20 0600  CBC Auto Differential  PROCEDURE ONCE      01/07/20 0003    01/07/20 0000  Ambulatory Referral to Home Health      01/07/20 1502    01/06/20 2200  !Vancomycin Level Draw Needed  Once,   Status:  Discontinued      01/04/20 1536    01/06/20 2200  Vancomycin, Trough  Timed,   Status:  Canceled      01/04/20 1536    01/05/20 1210  HYDROcodone-acetaminophen (NORCO)  MG per tablet 1 tablet  Every 6 Hours PRN      01/05/20 1210    01/05/20 0600  gabapentin (NEURONTIN) capsule 300 mg  Every 8 Hours Scheduled      01/05/20 0333    01/05/20 0000  Discharge Instructions     Comments:  Follow-up with PCP in a week  Take medications as prescribed  Call Hospitalist group with any question regarding this  admission [phone]  Gradually resume normal physical activities  Follow-up on pending labs and radiology reports    01/05/20 1539    01/05/20 0000  Gradually Increase Activity Until at Pre-Hospitalization Level      01/05/20 1539    01/05/20 0000  Diet: Consistent Carbohydrate      01/05/20 1539    01/04/20 1735  HYDROcodone-acetaminophen (NORCO) 5-325 MG per tablet 1 tablet  Every 6 Hours PRN      01/04/20 1736    01/04/20 1735  potassium chloride (K-DUR,KLOR-CON) CR tablet 40 mEq  As Needed      01/04/20 1736    01/04/20 1735  potassium chloride (KLOR-CON) packet 40 mEq  As Needed      01/04/20 1736    01/04/20 1735  potassium chloride 10 mEq in 100 mL IVPB  Every 1 Hour PRN      01/04/20 1736    01/04/20 1319  sodium chloride 0.9 % bolus 2,178 mL  Once      01/04/20 1317    01/04/20 0900  lactulose (CHRONULAC) 10 GM/15ML solution 30 g  Daily      01/04/20 0658    01/04/20 0658  ipratropium-albuterol (DUO-NEB) nebulizer solution 3 mL  Every 4 Hours PRN      01/04/20 0658    01/03/20 2055  sodium chloride 0.9 % flush 10 mL  As Needed      01/03/20 2056 01/03/20 0000  cephalexin (KEFLEX) 500 MG capsule  3 Times Daily      01/03/20 2021    Unscheduled  Magnesium  As Needed      01/04/20 1736    Unscheduled  Potassium  As Needed      01/04/20 1736    --  SCANNED - TELEMETRY        01/03/20 0000    --  SCANNED - TELEMETRY        01/03/20 0000    --  SCANNED - TELEMETRY        01/03/20 0000    --  SCANNED - TELEMETRY        01/03/20 0000    --  SCANNED - TELEMETRY        01/03/20 0000    --  SCANNED - TELEMETRY        01/03/20 0000    --  SCANNED - TELEMETRY        01/03/20 0000

## 2020-01-07 NOTE — PROGRESS NOTES
CC joints, back pain, jonah leg pain  79-year-old female with HTN, depression, chronic polyarthralgia from osteoarthritis, chronic back pain S/p L4-5 laminectomy with bony fusion 2018, Dr. Drake., here for follow-up.  Recent hospitalization for UTI with mental status change.  Her niece continues to manage her medication only living with a week worth with patient at home.  Considering assisted living facility.  Continued back pain bilateral lower extremity radicular pain and neurogenic claudication symptoms.  Seen orthospine considering another decompression.     Chronic  low back pain and bilateral lower extremity pain worse with walking standing relieved by rest.  Denies saddle anesthesia, bladder or bowel incontinence.  Chronic polyarthralgia / left knee pain/ ilateral feet neuropathic pain    Still  limited in ADL due to bilateral lower extremity pain and weakness.  Ambulating with walker    Utilizes gabapentin, hydrocodone with good relief functional benefit without side effects.      L-Spine x-ray  Degenerative changes of the lumbar spine with stable 6 mm anterolisthesis L4 upon L5  L-spine MRI 2019: postoperative changes are noted at L4 and L5 with laminectomies and posterior bony fusion. Degenerative changes are seen at multiple levels, greatest at L3-4, with moderate to severe spinal canal narrowing and moderate bilateral foraminal narrowing, greater on the left    C-spine CT 2017:  Degenerative changes spondylosis with central stenosis at C5-C6 and left foraminal stenosis C4/5    Pain Assessment   Location of Pain: Lower Back, R Hip, L Hip, R Leg, L Leg  Description of Pain: Dull/Aching, Throbbing, Stabbing  Previous Pain Rating : 8  Current Pain Ratin  Aggravating Factors: Activity  Alleviating Factors: Rest, Medication  Previous Treatments: Nerve Blocks/Injections, Epidural Steroids, Narcotic Pain Medication, Physical Therapy  Previous Diagnostic Studies: X-Ray, MRI    PEG Assessment   What  "number best describes your pain on average in the past week?8  What number best describes how, during the past week, pain has interfered with your enjoyment of life?5  What number best describes how, during the past week, pain has interfered with your general activity? 10     has a past medical history of Arthritis, Atrial fibrillation (CMS/HCC), Broken shoulder, Buttock pain, Depression, GERD (gastroesophageal reflux disease), H/O fall, Hip pain, Hypertension, Hypothyroidism, Leg pain, Low back pain, and Occipital bone fracture (CMS/HCC).     has a past surgical history that includes Hysterectomy; Rotator cuff repair (Left); Cholecystectomy; and Back surgery (07/19/2018).  .  Social History     Tobacco Use   • Smoking status: Never Smoker   • Smokeless tobacco: Never Used   Substance Use Topics   • Alcohol use: No       Review of Systems        See HPI    General       Denies fever/chills, fatigue and sleep problems.    Eyes       Denies blurry vision and double vision.    ENT       Denies decreased hearing, sore throat and ears ringing.    CV       Denies chest pain and fainting.    Resp       Denies shortness of breath and cough.    GI       Denies heartburn, constipation, nausea, vomiting and diarrhea.           Denies pain on urination, incontinence and increased frequency.    MS       Complains of joint swelling and cramps.       Denies weakness.       Right shoulder pain, low back pain, bilateral hip pain left knee.    Derm       Denies rash and itching.    Neuro       Denies numbness, tingling, loss of balance and history of seizures.    Psych       Denies anxiety and depression.    Endo       Denies weight change and thirsty all the time.    Heme       Denies easy bruising, bleeding and enlarged lymph nodes.    Vitals:    01/07/20 1247   BP: 151/80   Pulse: 79   Resp: 16   Temp: 97.9 °F (36.6 °C)   SpO2: 99%   Weight: 69.4 kg (153 lb)   Height: 162.6 cm (64\")     Physical Exam   General  General " appearance: well developed, well nourished, no acute distress  Gait and station: antalgic, walker    Mental Status Exam   Mental Status: AAO x3  Behavior: Appropriate    Cervical   ROM decreased w/ lateral rotation  Spurling's Test Negative  Palpation: Tender  Trapezius/LS  Comments: Right  paraspinal tenderness    Thoracolumbar   ROM: decreased rotation/extension  Straight Leg Raise: Negative  Palpation: Tender  Paravertebral    Muscle Stretch Reflex   Sensory Intact to light touch/pin prick    Neuro   Reflexes: R Arm 1+  L arm 1+  R Leg 1+  L Leg 1+    Strength: Arms Normal  Strength: Legs Normal     Eyes:      Clear conjunctivae,      Pupils rounds and reactive  ENT:      Clear oropharynx,       Mucosa moist/pink       Nose: no deformity  Chest Wall:      Non tender      No deformities or masses noted.    Respiratory:      Clear bilaterally to auscultation.        No dyspnea  Heart:      Regular rate and rhythm, no murmurs, rubs, or gallops   Pulses:      Pulses 2+, symmetric  Extremities:      No clubbing, cyanosis, edema, or deformity noted   Neurologic:      No focal deficits      Coordination intact with rapid alternating movement.  Skin:      Intact without lesions or rashes.  No mass  Cervical Nodes:      No adenopathy noted.    PHQ9 on chart                    opioid risk tool high risk        Assessment/Plan  Catalina was seen today for back pain, leg pain and follow-up.    Diagnoses and all orders for this visit:    Chronic pain syndrome  -     Discontinue: HYDROcodone-acetaminophen (NORCO)  MG per tablet; Take 1 tablet by mouth 3 (Three) Times a Day As Needed for Severe Pain .  -     HYDROcodone-acetaminophen (NORCO)  MG per tablet; Take 1 tablet by mouth 3 (Three) Times a Day As Needed for Severe Pain .    Lumbar stenosis with neurogenic claudication    Postlaminectomy syndrome of lumbar region  -     Discontinue: HYDROcodone-acetaminophen (NORCO)  MG per tablet; Take 1 tablet by mouth  3 (Three) Times a Day As Needed for Severe Pain .  -     HYDROcodone-acetaminophen (NORCO)  MG per tablet; Take 1 tablet by mouth 3 (Three) Times a Day As Needed for Severe Pain .    Polyarthralgia  -     Discontinue: HYDROcodone-acetaminophen (NORCO)  MG per tablet; Take 1 tablet by mouth 3 (Three) Times a Day As Needed for Severe Pain .  -     HYDROcodone-acetaminophen (NORCO)  MG per tablet; Take 1 tablet by mouth 3 (Three) Times a Day As Needed for Severe Pain .    Neuropathic pain    High risk medication use    Summary:   79-year-old female with HTN, depression, chronic polyarthralgia from osteoarthritis, chronic back pain from DDD/ spondylosis/stenosis, S/p L4/5 laminectomy with bony fusion 7/2018, Dr. Drake seen in  follow-up.     Chronic lower lumbar /coccyx pain and RLE radicular pain, continued bilateral lower extremity weakness.  Recent hospitalization for UTI with mental status change.  Her niece continues to manage her medication only living with a week worth with patient at home.  Considering assisted living facility.     In face of the slow cognitive decline until patient is placed in facility. We will reduce hydrocodone to 10/325 3 times daily prn pain,  This will continue to manage medication with 1 week worth in the pillbox at this time.  UDS and Inspect reviewed.   Discussed risk of tolerance, dependence, respiratory depression, coma and death associated with use of oral opioids for treatment of chronic nonmalignant pain.    Continue flexeril    Home health ordered before impatient discharge    RTC  in  1 mo.

## 2020-01-07 NOTE — PLAN OF CARE
Problem: Patient Care Overview  Goal: Plan of Care Review  Flowsheets (Taken 1/7/2020 7885)  Progress: improving  Plan of Care Reviewed With: patient  Outcome Summary: Patient is ambulating every two hours up and down 2As hallway.  Patient is very steady on her feet while she uses a walker.  The patient does not want to go to [facility] in [place].  The family was given the case/social managers numbers to talk about placement.  The patient is still in a lot of back pain with restless legs.  Will continue to monitor.

## 2020-01-07 NOTE — PROGRESS NOTES
Continued Stay Note  KADEEM Rios     Patient Name: Catalina Moreno  MRN: [id number]  Today's Date: 1/7/2020    Admit Date: 1/3/2020    Discharge Plan     Row Name 01/07/20 1402       Plan    Plan  Anticipate inpatient rehab, [id number] pending for precert. See MSW for PASRR.     Plan Comments  Spoke with patient's grandson, Jorge, at patient's request, who is agreeable to inpatient rehab. Jorge would like first choice [facility], list left at bedside for additional choices as patient reports he will be by to visit this evening. Jorge is aware to make additional choices and leave at bedside or let RN known. Barrier: awaiting [id number] for precert.         Expected Discharge Date and Time     Expected Discharge Date Expected Discharge Time    Jan 8, 2020           Leilani Lainez  [phone]

## 2020-01-07 NOTE — PLAN OF CARE
Problem: Patient Care Overview  Goal: Plan of Care Review  Outcome: Ongoing (interventions implemented as appropriate)  Flowsheets  Taken 1/7/2020 1202  Plan of Care Reviewed With: patient  Taken 1/7/2020 1100  Outcome Summary: pt participates well and motivated to improve her functional status. completing badls/adl mobility w/ supervision, needs occasional cues for safety, as she tends to step away from AD prior to completing tranfers. still fatiguing quickly. will cont to follow per poc.pt now plans to dc to ip rehab d/t remaining weakness, which is appropriate, considering pt is alone at home.

## 2020-01-07 NOTE — PROGRESS NOTES
Continued Stay Note  [facility]     Patient Name: Catalina Moreno  MRN: [id number]  Today's Date: 1/7/2020    Admit Date: 1/3/2020    Discharge Plan     Row Name 01/07/20 1516       Plan    Plan  [id number] failed, plan for home with HH, referral sent to [facility].     Provided post acute provider list?  Yes    Post Acute Provider Lists  Home Health    Post Acuite Provider List  Delivered    Delivered To  Patient;Support Person    Support Person  Patrica Moreno    Method of Delivery  In person    Patient/Family in Agreement with Plan  yes    Plan Comments  Spoke with Sofia Patel, updated him that insurance denied precert. Jorge is agreeable to patient discharge home with HH, defers HH choice to patient. Per patient and granddaughter, Patrica, at bedside, HH choice is [facility] - referral made, they do not accept patient insurance. Second choice, [facility], referral placed in basket and liaison notified and also requested to make telephone contact Patrica Moreno ([phone]) per patient/granddaughter choice. CRISPIN Jackson, notified of granddaughter's muliple questions regarding potential future need for DURGA/LTC/Memory Care, etc.     Row Name 01/07/20 1414          Expected Discharge Date and Time     Expected Discharge Date Expected Discharge Time    Jan 7, 2020          Leilani Lainez  [phone]

## 2020-01-07 NOTE — PROGRESS NOTES
Case Management Discharge Note      Final Note: Home with [facility].     Provided post acute provider list?: Yes  Post Acute Provider Lists: Home Health  Post Acuite Provider List: Delivered  Delivered To: Patient, Support Person  Support Person: Patrica Moreno  Method of Delivery: In person          Home Medical Care - Selection Complete      Service Provider Request Status Selected Services Address Phone Number Fax Number    [facility] Selected Home Health Services [address] [address] [phone]     Final Discharge Disposition Code: 06 - home with home health care

## 2020-01-07 NOTE — PROGRESS NOTES
[facility] Medicine Services Daily Progress Note      Hospitalist Team  LOS 1 days      Patient Care Team:  Anayeli Costa APRN as PCP - General (Family Medicine)    Patient Location: 201/1      Subjective  Pain controlled, will get PT eval  Subjective     Chief Complaint / Subjective  Chief Complaint   Patient presents with   • Fall       Present on Admission:  • Altered mental status  • Hypertension  • Gastroesophageal reflux disease  • Hypothyroidism  • Lumbar radiculopathy      Brief Synopsis of Hospital Course/HPI  Ms. Moreno is a 79 y.o.  White female with multiple medical problems, presented to [facility] with mental status change, patient was brought to ER by grandson with increase agitation and confusion, symtoms started this am, apparently was seen at outlying facility and diagnosed with UTI, she was started on po Keflex, She took one dose, patient was given ativan in ER, and admitted to [facility].     At bedside, patient is nonverbal rocking in the bed, moving all extremities, airway intact.  Patient lives by herself per grandson.  There is no known febrile sick contacts    Date: c/o severe back pain, has had it for over a year, mental status back to baseline   initially not wanting NH, now agreeable to rehab, will consult PT/OT    Review of Systems   Constitution: Negative.   HENT: Negative.    Eyes: Negative.    Cardiovascular: Negative.    Respiratory: Positive for cough.    Endocrine: Negative.    Hematologic/Lymphatic: Negative.    Skin: Negative.    Musculoskeletal: Positive for back pain.   Gastrointestinal: Positive for diarrhea.   Genitourinary: Negative.    Neurological: Negative.    Psychiatric/Behavioral: Negative.    Allergic/Immunologic: Negative.    All other systems reviewed and are negative.    Objective - AMS-resolved; await rehab placement, pt feels stronger today  Objective      Vital Signs  Temp:  [98 °F (36.7 °C)-98.1 °F (36.7 °C)] 98.1 °F (36.7  "°C)  Heart Rate:  [72-82] 73  Resp:  [16-18] 16  BP: ()/(52-64) 106/52  Oxygen Therapy  SpO2: 95 %  Pulse Oximetry Type: Intermittent  Device (Oxygen Therapy): room air  Flow (L/min): 3  Flowsheet Rows      First Filed Value   Admission Height  162.6 cm (64\") Documented at 01/03/2020 1954   Admission Weight  72.6 kg (160 lb 0.9 oz) Documented at 01/03/2020 1954        Intake & Output (last 3 days)       01/04 0701 - 01/05 0700 01/05 0701 - 01/06 0700 01/06 0701 - 01/07 0700    P.O.  1080 960    I.V. (mL/kg) 1000 (13.6)      IV Piggyback  800     Total Intake(mL/kg) 1000 (13.6) 1880 (25.5) 960 (13)    Urine (mL/kg/hr) 1050 (0.6) 1850 (1) 1300 (0.7)    Stool  1     Total Output 1050 1851 1300    Net -50 +29 -340           Urine Unmeasured Occurrence 3 x 2 x         Lines, Drains & Airways    Active LDAs     Name:   Placement date:   Placement time:   Site:   Days:    Peripheral IV 01/03/20 2202 Left Forearm   01/03/20 2202    Forearm   1                Physical Exam   Constitutional: She is oriented to person, place, and time. She appears well-developed and well-nourished. No distress.   HENT:   Head: Normocephalic and atraumatic.   Mouth/Throat: No oropharyngeal exudate.   Eyes: Pupils are equal, round, and reactive to light. Conjunctivae and EOM are normal. Right eye exhibits no discharge. Left eye exhibits no discharge. No scleral icterus.   Neck: Normal range of motion. No JVD present. No tracheal deviation present. No thyromegaly present.   Cardiovascular: Normal rate, regular rhythm, normal heart sounds and intact distal pulses. Exam reveals no gallop and no friction rub.   No murmur heard.  Pulmonary/Chest: Effort normal and breath sounds normal. No stridor. No respiratory distress. She has no wheezes. She has no rales. She exhibits no tenderness.   Abdominal: Soft. Bowel sounds are normal. She exhibits no distension and no mass. There is no tenderness. There is no rebound and no guarding. No hernia. "   Musculoskeletal: Normal range of motion. She exhibits no edema, tenderness or deformity.   Back pain   Lymphadenopathy:     She has no cervical adenopathy.   Neurological: She is alert and oriented to person, place, and time. No cranial nerve deficit or sensory deficit. She exhibits normal muscle tone. Coordination normal.   Skin: Skin is warm and dry. No rash noted. She is not diaphoretic. No erythema.   Psychiatric: She has a normal mood and affect. Her behavior is normal.   Nursing note and vitals reviewed.        Procedures:      Results Review:     I reviewed the patient's new clinical results.      Lab Results (last 24 hours)     Procedure Component Value Units Date/Time    Comprehensive Metabolic Panel [[id number]]  (Abnormal) Collected:  01/07/20 0417    Specimen:  Blood Updated:  01/07/20 0555     Glucose 122 mg/dL      BUN 17 mg/dL      Creatinine 0.76 mg/dL      Sodium 140 mmol/L      Potassium 3.8 mmol/L      Chloride 104 mmol/L      CO2 23.0 mmol/L      Calcium 8.7 mg/dL      Total Protein 7.3 g/dL      Albumin 3.50 g/dL      ALT (SGPT) 18 U/L      AST (SGOT) 28 U/L      Alkaline Phosphatase 65 U/L      Total Bilirubin 0.4 mg/dL      eGFR Non African Amer 74 mL/min/1.73      Globulin 3.8 gm/dL      A/G Ratio 0.9 g/dL      BUN/Creatinine Ratio 22.4     Anion Gap 13.0 mmol/L     Narrative:       GFR Normal >60  Chronic Kidney Disease <60  Kidney Failure <15      CBC & Differential [[id number]] Collected:  01/07/20 0417    Specimen:  Blood Updated:  01/07/20 0523    Narrative:       The following orders were created for panel order CBC & Differential.  Procedure                               Abnormality         Status                     ---------                               -----------         ------                     CBC Auto Differential[[id number]]        Abnormal            Final result                 Please view results for these tests on the individual orders.    CBC Auto Differential  [[id number]]  (Abnormal) Collected:  01/07/20 0417    Specimen:  Blood Updated:  01/07/20 0523     WBC 5.80 10*3/mm3      RBC 3.11 10*6/mm3      Hemoglobin 9.5 g/dL      Hematocrit 28.2 %      MCV 90.8 fL      MCH 30.4 pg      MCHC 33.5 g/dL      RDW 15.3 %      RDW-SD 49.4 fl      MPV 7.6 fL      Platelets 228 10*3/mm3      Neutrophil % 45.0 %      Lymphocyte % 36.9 %      Monocyte % 9.0 %      Eosinophil % 7.8 %      Basophil % 1.3 %      Neutrophils, Absolute 2.60 10*3/mm3      Lymphocytes, Absolute 2.20 10*3/mm3      Monocytes, Absolute 0.50 10*3/mm3      Eosinophils, Absolute 0.50 10*3/mm3      Basophils, Absolute 0.10 10*3/mm3      nRBC 0.1 /100 WBC     Blood Culture - Blood, Arm, Left [[id number]] Collected:  01/04/20 0031    Specimen:  Blood from Arm, Left Updated:  01/07/20 0045     Blood Culture No growth at 3 days    Blood Culture - Blood, Arm, Right [[id number]] Collected:  01/03/20 2340    Specimen:  Blood from Arm, Right Updated:  01/07/20 0000     Blood Culture No growth at 3 days    Folate [[id number]]  (Normal) Collected:  01/06/20 0234    Specimen:  Blood Updated:  01/06/20 1119     Folate >20.00 ng/mL     Basic Metabolic Panel [[id number]]  (Abnormal) Collected:  01/06/20 0234    Specimen:  Blood Updated:  01/06/20 0836     Glucose 126 mg/dL      BUN 21 mg/dL      Creatinine 0.94 mg/dL      Sodium 138 mmol/L      Potassium 3.8 mmol/L      Chloride 102 mmol/L      CO2 21.0 mmol/L      Calcium 8.5 mg/dL      eGFR Non African Amer 58 mL/min/1.73      BUN/Creatinine Ratio 22.3     Anion Gap 15.0 mmol/L     Narrative:       GFR Normal >60  Chronic Kidney Disease <60  Kidney Failure <15          No results found for: HGBA1C  Results from last 7 days   Lab Units 01/03/20  2340   INR  1.18*       Results from last 7 days   Lab Units 01/03/20  2324   PH, ARTERIAL pH units 7.522*   PO2 ART mm Hg 69.1*   PCO2, ARTERIAL mm Hg 31.0*   HCO3 ART mmol/L 25.5         Microbiology Results (last 10 days)     Procedure  Component Value - Date/Time    Blood Culture - Blood, Arm, Left [[id number]] Collected:  01/04/20 0031    Lab Status:  Preliminary result Specimen:  Blood from Arm, Left Updated:  01/07/20 0045     Blood Culture No growth at 3 days    Respiratory Panel, PCR - Swab, Nasopharynx [[id number]]  (Normal) Collected:  01/04/20 0017    Lab Status:  Final result Specimen:  Swab from Nasopharynx Updated:  01/04/20 0246     ADENOVIRUS, PCR Not Detected     Coronavirus 229E Not Detected     Coronavirus HKU1 Not Detected     Coronavirus NL63 Not Detected     Coronavirus OC43 Not Detected     Human Metapneumovirus Not Detected     Human Rhinovirus/Enterovirus Not Detected     Influenza B PCR Not Detected     Parainfluenza Virus 1 Not Detected     Parainfluenza Virus 2 Not Detected     Parainfluenza Virus 3 Not Detected     Parainfluenza Virus 4 Not Detected     Bordetella pertussis pcr Not Detected     Influenza A H1 2009 PCR Not Detected     Chlamydophila pneumoniae PCR Not Detected     Mycoplasma pneumo by PCR Not Detected     Influenza A PCR Not Detected     Influenza A H3 Not Detected     Influenza A H1 Not Detected     RSV, PCR Not Detected    Blood Culture - Blood, Arm, Right [[id number]] Collected:  01/03/20 2340    Lab Status:  Preliminary result Specimen:  Blood from Arm, Right Updated:  01/07/20 0000     Blood Culture No growth at 3 days          ECG/EMG Results (most recent)     Procedure Component Value Units Date/Time    ECG 12 Lead [[id number]] Collected:  01/03/20 2258     Updated:  01/05/20 0648    Narrative:       HEART RATE= 107  bpm  RR Interval= 562  ms  MT Interval= 373  ms  P Horizontal Axis= 193  deg  P Front Axis= 0  deg  QRSD Interval= 137  ms  QT Interval= 247  ms  QRS Axis= 47  deg  T Wave Axis= 251  deg  - ABNORMAL ECG -  Atrial-ventricular dual-paced complexes  When compared with ECG of 12-Jan-2019 6:04:32,  Significant change in rhythm  Electronically Signed By: Mark Rios ([place]) 05-Jan-2020  06:48:30  Date and Time of Study: 2020-01-03 22:58:05          Results for orders placed in visit on 07/15/19   SCANNED VASCULAR STUDIES            Ct Head Without Contrast    Result Date: 1/4/2020  1. No acute intracranial abnormality is identified. 2. Mild generalized brain volume loss and small vessel ischemic changes. Miguel Treadwell M.D. Neuroradiologist Electronically signed by:  Miguel Treawdell M.D.  1/4/2020 12:40 AM    Mri Lumbar Spine Without Contrast    Result Date: 1/5/2020  IMPRESSION :  1. Grade 1 anterior spondylolisthesis of L4 on L5. 2. Degenerative changes. 3. Borderline canal stenosis at L3-L4. There is neural foraminal narrowing without nerve root impingement. 4. Level by level findings are described in detail above.  Electronically Signed By-Jona Jacobs On:1/5/2020 6:05 PM This report was finalized on [id number] by  Jona Jacobs, .    Xr Chest 1 View    Result Date: 1/4/2020   Cardiomegaly with pulmonary vascular congestion.  No focal airspace consolidation or pleural effusion.   Electronically Signed By-Alvino Treadwell On:1/4/2020 8:23 AM This report was finalized on [id number] by  Alvino Treadwell, .      Xrays, labs reviewed personally by physician.    Medication Review:   I have reviewed the patient's current medication list      Scheduled Meds    gabapentin 300 mg Oral Q8H   lactulose 30 g Oral Daily   sodium chloride 30 mL/kg Intravenous Once       Meds Infusions       Meds PRN  HYDROcodone-acetaminophen  •  HYDROcodone-acetaminophen  •  ipratropium-albuterol  •  potassium chloride **OR** potassium chloride **OR** potassium chloride  •  [COMPLETED] Insert peripheral IV **AND** sodium chloride        Assessment/Plan   Assessment/Plan     Active Hospital Problems    Diagnosis  POA   • **Altered mental status [R41.82]  Yes   • Lumbar radiculopathy [M54.16]  Yes   • Hypertension [I10]  Yes   • Gastroesophageal reflux disease [K21.9]  Yes   • Hypothyroidism [E03.9]  Yes      Resolved Hospital  Problems    Diagnosis Date Resolved POA   • Sepsis (CMS/HCC) [A41.9] 01/05/2020 Unknown         Active Hospital Problems:  * Altered mental status- (present on admission)  Likely secondary to polypharmacy, chronic back pain on narcotics, ? infectious    on IV abx dc in am  BC (-)x1day  UA-(-)  procal neg      Lumbar radiculopathy- (present on admission)  C/o severe back pain, take pain meds 4 x day at home  S/p L4 surgery, last MRI 6 month ago  Will recheck     Hypothyroidism- (present on admission)  Continue synthroid   nl TSH     Hypertension- (present on admission)   Hold bp meds  Monitor BP    Gastroesophageal reflux disease- (present on admission)  protonix q day     Falls- get pt eval      Resolved Hospital Problems:  Sepsis (CMS/HCC)-resolved as of 1/5/2020  ? Source]   IV abx started in ER  Follow Blood culture  Urine culture  IV fluids    CXR and UA negative  WBC 6.3-10.9  T 101  Lactic acid 2.4        Consult pt/ot >placement      VTE Prophylaxis - SCDs.      Code Status -   Code Status and Medical Interventions:   Ordered at: 01/04/20 0550     Code Status:    CPR     Medical Interventions (Level of Support Prior to Arrest):    Full       Discharge Planning    Destination      Coordination has not been started for this encounter.      Durable Medical Equipment      Coordination has not been started for this encounter.      Dialysis/Infusion      Coordination has not been started for this encounter.      Home Medical Care      Coordination has not been started for this encounter.      Therapy      Coordination has not been started for this encounter.      Community Resources      Coordination has not been started for this encounter.            Electronically signed by Bryant Aldridge Jr., MD, 01/07/20, 6:52 AM.  [facility], [place], operated by [facility] Hospitalist Team

## 2020-01-08 ENCOUNTER — READMISSION MANAGEMENT (OUTPATIENT)
Dept: CALL CENTER | Facility: HOSPITAL | Age: 80
End: 2020-01-08

## 2020-01-08 NOTE — OUTREACH NOTE
Prep Survey      Responses   Facility patient discharged from?  [facility]   Is patient eligible?  Yes   Discharge diagnosis  Altered mental status   Does the patient have one of the following disease processes/diagnoses(primary or secondary)?  Other   Does the patient have Home health ordered?  Yes   What is the Home health agency?   [facility]   Is there a DME ordered?  No   Medication alerts for this patient  Stop Elavil   Prep survey completed?  Yes          Bibiana Johnson LPN

## 2020-01-09 ENCOUNTER — READMISSION MANAGEMENT (OUTPATIENT)
Dept: CALL CENTER | Facility: HOSPITAL | Age: 80
End: 2020-01-09

## 2020-01-09 LAB
BACTERIA SPEC AEROBE CULT: NORMAL
BACTERIA SPEC AEROBE CULT: NORMAL

## 2020-01-09 NOTE — OUTREACH NOTE
Medical Week 1 Survey      Responses   Facility patient discharged from?  [facility]   Does the patient have one of the following disease processes/diagnoses(primary or secondary)?  Other   Is there a successful TCM telephone encounter documented?  No   Week 1 attempt successful?  No   Rescheduled  Revoked   Revoke  Decline to participate [NO ANSWER, LEFT VM]          Yuki Templeton LPN

## 2020-01-21 ENCOUNTER — HOSPITAL ENCOUNTER (EMERGENCY)
Facility: HOSPITAL | Age: 80
Discharge: HOME | End: 2020-01-21
Attending: EMERGENCY MEDICINE | Admitting: EMERGENCY MEDICINE

## 2020-01-21 ENCOUNTER — APPOINTMENT (OUTPATIENT)
Dept: CT IMAGING | Facility: HOSPITAL | Age: 80
End: 2020-01-21

## 2020-01-21 VITALS
HEIGHT: 64 IN | BODY MASS INDEX: 26.12 KG/M2 | DIASTOLIC BLOOD PRESSURE: 73 MMHG | RESPIRATION RATE: 16 BRPM | TEMPERATURE: 97.8 F | WEIGHT: 153 LBS | HEART RATE: 74 BPM | OXYGEN SATURATION: 98 % | SYSTOLIC BLOOD PRESSURE: 166 MMHG

## 2020-01-21 DIAGNOSIS — M54.9 ACUTE BACK PAIN, UNSPECIFIED BACK LOCATION, UNSPECIFIED BACK PAIN LATERALITY: Primary | ICD-10-CM

## 2020-01-21 LAB
ANION GAP SERPL CALCULATED.3IONS-SCNC: 12 MMOL/L (ref 5–15)
BASOPHILS # BLD AUTO: 0.1 10*3/MM3 (ref 0–0.2)
BASOPHILS NFR BLD AUTO: 1.2 % (ref 0–1.5)
BILIRUB UR QL STRIP: NEGATIVE
BUN BLD-MCNC: 18 MG/DL (ref 8–23)
BUN/CREAT SERPL: 24.7 (ref 7–25)
CALCIUM SPEC-SCNC: 10.2 MG/DL (ref 8.6–10.5)
CHLORIDE SERPL-SCNC: 98 MMOL/L (ref 98–107)
CLARITY UR: CLEAR
CO2 SERPL-SCNC: 30 MMOL/L (ref 22–29)
COLOR UR: YELLOW
CREAT BLD-MCNC: 0.73 MG/DL (ref 0.57–1)
DEPRECATED RDW RBC AUTO: 45.5 FL (ref 37–54)
EOSINOPHIL # BLD AUTO: 0.1 10*3/MM3 (ref 0–0.4)
EOSINOPHIL NFR BLD AUTO: 0.6 % (ref 0.3–6.2)
ERYTHROCYTE [DISTWIDTH] IN BLOOD BY AUTOMATED COUNT: 14.4 % (ref 12.3–15.4)
GFR SERPL CREATININE-BSD FRML MDRD: 77 ML/MIN/1.73
GLUCOSE BLD-MCNC: 109 MG/DL (ref 65–99)
GLUCOSE UR STRIP-MCNC: NEGATIVE MG/DL
HCT VFR BLD AUTO: 33.4 % (ref 34–46.6)
HGB BLD-MCNC: 11.5 G/DL (ref 12–15.9)
HGB UR QL STRIP.AUTO: NEGATIVE
KETONES UR QL STRIP: NEGATIVE
LEUKOCYTE ESTERASE UR QL STRIP.AUTO: NEGATIVE
LYMPHOCYTES # BLD AUTO: 2 10*3/MM3 (ref 0.7–3.1)
LYMPHOCYTES NFR BLD AUTO: 19.8 % (ref 19.6–45.3)
MCH RBC QN AUTO: 30.7 PG (ref 26.6–33)
MCHC RBC AUTO-ENTMCNC: 34.4 G/DL (ref 31.5–35.7)
MCV RBC AUTO: 89.4 FL (ref 79–97)
MONOCYTES # BLD AUTO: 0.6 10*3/MM3 (ref 0.1–0.9)
MONOCYTES NFR BLD AUTO: 6.2 % (ref 5–12)
NEUTROPHILS # BLD AUTO: 7.4 10*3/MM3 (ref 1.7–7)
NEUTROPHILS NFR BLD AUTO: 72.2 % (ref 42.7–76)
NITRITE UR QL STRIP: NEGATIVE
NRBC BLD AUTO-RTO: 0 /100 WBC (ref 0–0.2)
PH UR STRIP.AUTO: 7.5 [PH] (ref 5–8)
PLATELET # BLD AUTO: 301 10*3/MM3 (ref 140–450)
PMV BLD AUTO: 7.2 FL (ref 6–12)
POTASSIUM BLD-SCNC: 4 MMOL/L (ref 3.5–5.2)
PROT UR QL STRIP: NEGATIVE
RBC # BLD AUTO: 3.74 10*6/MM3 (ref 3.77–5.28)
SODIUM BLD-SCNC: 140 MMOL/L (ref 136–145)
SP GR UR STRIP: 1.01 (ref 1–1.03)
UROBILINOGEN UR QL STRIP: NORMAL
WBC NRBC COR # BLD: 10.2 10*3/MM3 (ref 3.4–10.8)

## 2020-01-21 PROCEDURE — 25010000002 MORPHINE PER 10 MG: Performed by: EMERGENCY MEDICINE

## 2020-01-21 PROCEDURE — 85025 COMPLETE CBC W/AUTO DIFF WBC: CPT | Performed by: EMERGENCY MEDICINE

## 2020-01-21 PROCEDURE — 80048 BASIC METABOLIC PNL TOTAL CA: CPT | Performed by: EMERGENCY MEDICINE

## 2020-01-21 PROCEDURE — 96374 THER/PROPH/DIAG INJ IV PUSH: CPT

## 2020-01-21 PROCEDURE — 81003 URINALYSIS AUTO W/O SCOPE: CPT | Performed by: EMERGENCY MEDICINE

## 2020-01-21 PROCEDURE — P9612 CATHETERIZE FOR URINE SPEC: HCPCS

## 2020-01-21 PROCEDURE — 74176 CT ABD & PELVIS W/O CONTRAST: CPT

## 2020-01-21 PROCEDURE — 99284 EMERGENCY DEPT VISIT MOD MDM: CPT

## 2020-01-21 RX ORDER — SODIUM CHLORIDE 0.9 % (FLUSH) 0.9 %
10 SYRINGE (ML) INJECTION AS NEEDED
Status: DISCONTINUED | OUTPATIENT
Start: 2020-01-21 | End: 2020-01-21 | Stop reason: HOSPADM

## 2020-01-21 RX ORDER — MORPHINE SULFATE 4 MG/ML
2 INJECTION, SOLUTION INTRAMUSCULAR; INTRAVENOUS ONCE
Status: COMPLETED | OUTPATIENT
Start: 2020-01-21 | End: 2020-01-21

## 2020-01-21 RX ORDER — TRAMADOL HYDROCHLORIDE 50 MG/1
50 TABLET ORAL EVERY 8 HOURS PRN
Qty: 10 TABLET | Refills: 0 | Status: SHIPPED | OUTPATIENT
Start: 2020-01-21 | End: 2020-02-04 | Stop reason: ALTCHOICE

## 2020-01-21 RX ADMIN — MORPHINE SULFATE 2 MG: 4 INJECTION INTRAVENOUS at 09:46

## 2020-01-21 NOTE — ED NOTES
Pt c/o back pain that radiates down her legs.  Pt just finished antibiotics last week for a UTI.  Pt states she currently has polyuria.  She states she has not noticed any blood in her urine.       Yanelis Solano RN  01/21/20 0922

## 2020-01-23 ENCOUNTER — OFFICE VISIT (OUTPATIENT)
Dept: CARDIOLOGY | Facility: CLINIC | Age: 80
End: 2020-01-23

## 2020-01-23 VITALS
OXYGEN SATURATION: 96 % | HEIGHT: 64 IN | HEART RATE: 74 BPM | BODY MASS INDEX: 27.66 KG/M2 | SYSTOLIC BLOOD PRESSURE: 124 MMHG | DIASTOLIC BLOOD PRESSURE: 56 MMHG | WEIGHT: 162 LBS

## 2020-01-23 DIAGNOSIS — I50.33 ACUTE ON CHRONIC DIASTOLIC CONGESTIVE HEART FAILURE (HCC): ICD-10-CM

## 2020-01-23 DIAGNOSIS — I10 ESSENTIAL HYPERTENSION: Primary | ICD-10-CM

## 2020-01-23 DIAGNOSIS — I25.9 CHEST PAIN DUE TO MYOCARDIAL ISCHEMIA, UNSPECIFIED ISCHEMIC CHEST PAIN TYPE: ICD-10-CM

## 2020-01-23 DIAGNOSIS — R06.09 DYSPNEA ON EXERTION: ICD-10-CM

## 2020-01-23 DIAGNOSIS — I36.1 NONRHEUMATIC TRICUSPID VALVE REGURGITATION: ICD-10-CM

## 2020-01-23 DIAGNOSIS — I48.0 PAROXYSMAL ATRIAL FIBRILLATION (HCC): ICD-10-CM

## 2020-01-23 PROCEDURE — 93010 ELECTROCARDIOGRAM REPORT: CPT | Performed by: INTERNAL MEDICINE

## 2020-01-23 PROCEDURE — 99214 OFFICE O/P EST MOD 30 MIN: CPT | Performed by: INTERNAL MEDICINE

## 2020-01-23 NOTE — PROGRESS NOTES
Subjective:     Encounter Date:01/23/2020      Patient ID: Catalina Moreno is a 79 y.o. female.    Chief Complaint: Follow-up for A-fib  History of Present Illness     79year-old white female patient with history of paroxysmal atrial fibrillation hypertension problems with fluid overload is back for follow-up  Patient had a stress Myoview January 2019 which showed no ischemia  Echocardiogram was done recently in June 2019 which showed LVEF 60% moderate to severe pulmonary hypertension moderate to severe tricuspid insufficiency RV enlargement  She had problems with fluid overload balance problems  She is having some venous insufficiency  Advised tight stockings  Continue anticoagulation    Patient was recently admitted to hospital at that time found to have congestive heart failure with elevated BNP  Somehow her diuretics were stopped but she restarted diuretics and Eliquis recently  Now she is complaining of on and off symptoms of chest discomfort dizziness shortness of breath  At rest or with exertion without any exacerbating or relieving factors  Chest discomfort mostly feels like tightness with some radiation to the neck  No syncopal episodes she does get edema  Advise repeating the stress Myoview in the near future EKG today sinus rhythm normal EKG  Patient cannot walk on the treadmillUses walker to ambulate  The following portions of the patient's history were reviewed and updated as appropriate: Allergies current medications past family history past medical history past social history past surgical history problem list and review of systems  Past Medical History:   Diagnosis Date   • Arthritis    • Atrial fibrillation (CMS/HCC)    • Broken shoulder     left-- due to fall 11-7-19 was at [facility]   • Buttock pain     rt side   • Depression    • GERD (gastroesophageal reflux disease)    • H/O fall    • Hip pain     rt   • Hypertension    • Hypothyroidism    • Leg pain     rt   • Low back pain    • Occipital  "bone fracture (CMS/HCC)     left- due to fall   11-7-19 was inpt at [facility]     Past Surgical History:   Procedure Laterality Date   • BACK SURGERY  07/19/2018    PDC &  PSF L3-L5 insitu   • CHOLECYSTECTOMY     • HYSTERECTOMY     • ROTATOR CUFF REPAIR Left      /56 (BP Location: Left arm, Patient Position: Sitting, Cuff Size: Adult)   Pulse 74   Ht 162.6 cm (64\")   Wt 73.5 kg (162 lb)   SpO2 96%   BMI 27.81 kg/m²   Family History   Problem Relation Age of Onset   • Cancer Father    • Diabetes Son    • Cancer Paternal Aunt    • Heart disease Paternal Aunt    • Cancer Paternal Uncle        Current Outpatient Medications:   •  ELIQUIS 5 MG tablet tablet, Take 5 mg by mouth 2 (Two) Times a Day., Disp: , Rfl: 0  •  famotidine (PEPCID) 20 MG tablet, Take 20 mg by mouth 2 (Two) Times a Day., Disp: , Rfl: 0  •  furosemide (LASIX) 20 MG tablet, Take 20 mg by mouth Daily., Disp: , Rfl: 0  •  gabapentin (NEURONTIN) 600 MG tablet, Take 600 mg by mouth 3 (Three) Times a Day., Disp: , Rfl:   •  HYDROcodone-acetaminophen (NORCO)  MG per tablet, Take 1 tablet by mouth 3 (Three) Times a Day As Needed for Severe Pain ., Disp: 90 tablet, Rfl: 0  •  levothyroxine (SYNTHROID, LEVOTHROID) 75 MCG tablet, Take 75 mcg by mouth Daily., Disp: , Rfl: 0  •  metoprolol succinate XL (TOPROL-XL) 50 MG 24 hr tablet, Take 50 mg by mouth Daily., Disp: , Rfl:   •  pantoprazole (PROTONIX) 40 MG EC tablet, Take 40 mg by mouth Daily., Disp: , Rfl: 0  •  traMADol (ULTRAM) 50 MG tablet, Take 1 tablet by mouth Every 8 (Eight) Hours As Needed for Moderate Pain ., Disp: 10 tablet, Rfl: 0  Social History     Socioeconomic History   • Marital status:      Spouse name: Not on file   • Number of children: Not on file   • Years of education: Not on file   • Highest education level: Not on file   Tobacco Use   • Smoking status: Never Smoker   • Smokeless tobacco: Never Used   Substance and Sexual Activity   • Alcohol use: No   • Drug use: No "   • Sexual activity: Defer     Allergies   Allergen Reactions   • Baclofen Rash   • Codeine Nausea Only   • Ibuprofen Unknown (See Comments)     Patient doesn't know----Motin   • Methocarbamol Unknown (See Comments)     Patient doesn't know   • Naproxen Unknown (See Comments)     Pt. Doesn't know    • Tizanidine Hcl Hallucinations   • Tolmetin Unknown (See Comments)     Pt. Doesn't know-- same as Tolectin     Review of Systems   Constitution: Negative for fever and malaise/fatigue.   HENT: Negative for congestion and hearing loss.    Eyes: Negative for double vision and visual disturbance.   Cardiovascular: Positive for chest pain, leg swelling and palpitations. Negative for claudication, dyspnea on exertion and syncope.   Respiratory: Positive for shortness of breath. Negative for cough.    Endocrine: Negative for cold intolerance.   Skin: Negative for color change and rash.   Musculoskeletal: Negative for arthritis and joint pain.   Gastrointestinal: Positive for nausea. Negative for abdominal pain and heartburn.   Genitourinary: Negative for hematuria.   Neurological: Positive for dizziness. Negative for excessive daytime sleepiness.   Psychiatric/Behavioral: Negative for depression. The patient is not nervous/anxious.    All other systems reviewed and are negative.             Objective:     Physical Exam   Constitutional: She is oriented to person, place, and time. She appears well-developed and well-nourished. She is cooperative.   HENT:   Head: Normocephalic and atraumatic.   Mouth/Throat: Uvula is midline and oropharynx is clear and moist. No oral lesions.   Eyes: Conjunctivae are normal. No scleral icterus.   Neck: Trachea normal. Neck supple. No JVD present. Carotid bruit is not present. No thyromegaly present.   Cardiovascular: Normal rate, regular rhythm, S1 normal, S2 normal, intact distal pulses and normal pulses. PMI is not displaced. Exam reveals no gallop and no friction rub.   Murmur  heard.  Pulmonary/Chest: Effort normal. She has rales.   Abdominal: Soft. Bowel sounds are normal.   Musculoskeletal: Normal range of motion. She exhibits edema and deformity.   Neurological: She is alert and oriented to person, place, and time. She has normal strength.   No focal deficits   Skin: Skin is warm. No cyanosis.   Psychiatric: She has a normal mood and affect.       Procedures    Lab Review:       Assessment:          Diagnosis Plan   1. Essential hypertension     2. Acute on chronic diastolic congestive heart failure (CMS/HCC)     3. Nonrheumatic tricuspid valve regurgitation     4. Chest pain due to myocardial ischemia, unspecified ischemic chest pain type     5. Dyspnea on exertion     6. Paroxysmal atrial fibrillation (CMS/HCC)            Plan:       Needs aggressive blood pressure control  Patient is staying in sinus rhythm will continue anticoagulation with Eliquis  History of pulmonary hypertensionSuggest pulmonary follow-up  Symptoms of chest pain new we will schedule pharmacological stress Myoview  Dyspnea on exertion probably multifactorial

## 2020-01-27 ENCOUNTER — TELEPHONE (OUTPATIENT)
Dept: PAIN MEDICINE | Facility: HOSPITAL | Age: 80
End: 2020-01-27

## 2020-01-27 NOTE — TELEPHONE ENCOUNTER
Been to the ER with severe back pain ER suggested she contact you and ask for a referral to a spine surgeon.

## 2020-01-27 NOTE — TELEPHONE ENCOUNTER
She used to see Dr. Drake.    She does not need a new referral she can just call the spine office and will be scheduled with Dr. Coppola.

## 2020-01-31 ENCOUNTER — TELEPHONE (OUTPATIENT)
Dept: CARDIOLOGY | Facility: CLINIC | Age: 80
End: 2020-01-31

## 2020-01-31 NOTE — TELEPHONE ENCOUNTER
Please let her know her stress test was okay and advised patient to follow-up with me in the next 4 weeks make sure granddaughter comes with her for follow-up appointment I did not change any medications  If she have any symptoms prior to that call me

## 2020-01-31 NOTE — TELEPHONE ENCOUNTER
Patient's Granddaughter & POA, Patrica, called stating that she had to leave yesterday while patient was having her TMT done at [place] and the patient has some dementia and was not able to tell her what was said after the test.   She just wants to make sure that there were not any changes to her medications, treatment plan, or if she needs a follow-up.

## 2020-02-04 ENCOUNTER — TELEPHONE (OUTPATIENT)
Dept: PAIN MEDICINE | Facility: HOSPITAL | Age: 80
End: 2020-02-04

## 2020-02-04 ENCOUNTER — OFFICE VISIT (OUTPATIENT)
Dept: PAIN MEDICINE | Facility: CLINIC | Age: 80
End: 2020-02-04

## 2020-02-04 VITALS
DIASTOLIC BLOOD PRESSURE: 77 MMHG | WEIGHT: 158 LBS | OXYGEN SATURATION: 96 % | RESPIRATION RATE: 16 BRPM | HEIGHT: 61 IN | SYSTOLIC BLOOD PRESSURE: 149 MMHG | BODY MASS INDEX: 29.83 KG/M2 | HEART RATE: 63 BPM | TEMPERATURE: 98.3 F

## 2020-02-04 DIAGNOSIS — M48.062 LUMBAR STENOSIS WITH NEUROGENIC CLAUDICATION: ICD-10-CM

## 2020-02-04 DIAGNOSIS — M96.1 POSTLAMINECTOMY SYNDROME OF LUMBAR REGION: ICD-10-CM

## 2020-02-04 DIAGNOSIS — M25.50 POLYARTHRALGIA: ICD-10-CM

## 2020-02-04 DIAGNOSIS — Z79.899 HIGH RISK MEDICATION USE: ICD-10-CM

## 2020-02-04 DIAGNOSIS — G89.4 CHRONIC PAIN SYNDROME: Primary | ICD-10-CM

## 2020-02-04 PROCEDURE — 99214 OFFICE O/P EST MOD 30 MIN: CPT | Performed by: ANESTHESIOLOGY

## 2020-02-04 PROCEDURE — G0463 HOSPITAL OUTPT CLINIC VISIT: HCPCS | Performed by: ANESTHESIOLOGY

## 2020-02-04 RX ORDER — GABAPENTIN 600 MG/1
600 TABLET ORAL 4 TIMES DAILY
Qty: 120 TABLET | Refills: 5 | Status: SHIPPED | OUTPATIENT
Start: 2020-02-04 | End: 2020-07-31 | Stop reason: SDUPTHER

## 2020-02-04 RX ORDER — HYDROCODONE BITARTRATE AND ACETAMINOPHEN 10; 325 MG/1; MG/1
1 TABLET ORAL 3 TIMES DAILY PRN
Qty: 90 TABLET | Refills: 0 | Status: SHIPPED | OUTPATIENT
Start: 2020-02-04 | End: 2020-04-13 | Stop reason: SDUPTHER

## 2020-02-04 RX ORDER — SERTRALINE HYDROCHLORIDE 25 MG/1
TABLET, FILM COATED ORAL
COMMUNITY
End: 2020-04-13

## 2020-02-04 RX ORDER — HYDROCODONE BITARTRATE AND ACETAMINOPHEN 10; 325 MG/1; MG/1
1 TABLET ORAL 3 TIMES DAILY PRN
Qty: 90 TABLET | Refills: 0 | Status: SHIPPED | OUTPATIENT
Start: 2020-02-04 | End: 2020-02-04 | Stop reason: SDUPTHER

## 2020-02-04 NOTE — PROGRESS NOTES
CC joints, back pain, jonah leg pain  79-year-old female with HTN, depression, chronic polyarthralgia from osteoarthritis, chronic back pain S/p L4-5 laminectomy with bony fusion 2018, Dr. Drake., here for follow-up.  Here with niece who continues to help  manage her medication at home.  Considering assisted living facility.  Hydrocodone dose reduction last visit tolerated well.  Reports increase in pain at night.  Has called to follow-up with neurosurgery/Dr. Coppola.  Chronic back pain bilateral lower extremity radicular pain and neurogenic claudication symptoms.       Chronic  low back pain and bilateral lower extremity pain worse with walking standing relieved by rest.  Denies saddle anesthesia, bladder or bowel incontinence.  Chronic polyarthralgia / left knee pain/ ilateral feet neuropathic pain    Still  limited in ADL due to bilateral lower extremity pain and weakness.  Ambulating with walker    Utilizes gabapentin, hydrocodone with good relief functional benefit without side effects.      L-Spine x-ray 2019 Degenerative changes of the lumbar spine with stable 6 mm anterolisthesis L4 upon L5  L-spine MRI 2019: postoperative changes are noted at L4 and L5 with laminectomies and posterior bony fusion. Degenerative changes are seen at multiple levels, greatest at L3-4, with moderate to severe spinal canal narrowing and moderate bilateral foraminal narrowing, greater on the left    C-spine CT 2017:  Degenerative changes spondylosis with central stenosis at C5-C6 and left foraminal stenosis C4/5    Pain Assessment   Location of Pain: Lower Back, R Hip, L Hip, R Leg, L Leg  Description of Pain: Dull/Aching, Throbbing, Stabbing  Previous Pain Rating : 8  Current Pain Ratin  Aggravating Factors: Activity  Alleviating Factors: Rest, Medication  Previous Treatments: Nerve Blocks/Injections, Epidural Steroids, Narcotic Pain Medication, Physical Therapy  Previous Diagnostic Studies: X-Ray, MRI    PEG Assessment  "  What number best describes your pain on average in the past week?8  What number best describes how, during the past week, pain has interfered with your enjoyment of life?8  What number best describes how, during the past week, pain has interfered with your general activity? 9     has a past medical history of Arthritis, Atrial fibrillation (CMS/HCC), Broken shoulder, Buttock pain, Depression, GERD (gastroesophageal reflux disease), H/O fall, Hip pain, Hypertension, Hypothyroidism, Leg pain, Low back pain, and Occipital bone fracture (CMS/HCC).     has a past surgical history that includes Hysterectomy; Rotator cuff repair (Left); Cholecystectomy; Back surgery (07/19/2018); and Colonoscopy.  .  Social History     Tobacco Use   • Smoking status: Never Smoker   • Smokeless tobacco: Never Used   Substance Use Topics   • Alcohol use: No       Review of Systems        See HPI    General       Denies fever/chills, fatigue and sleep problems.    Eyes       Denies blurry vision and double vision.    ENT       Denies decreased hearing, sore throat and ears ringing.    CV       Denies chest pain and fainting.    Resp       Denies shortness of breath and cough.    GI       Denies heartburn, constipation, nausea, vomiting and diarrhea.           Denies pain on urination, incontinence and increased frequency.    MS       Complains of joint swelling and cramps.       Denies weakness.       Right shoulder pain, low back pain, bilateral hip pain left knee.    Derm       Denies rash and itching.    Neuro       Denies numbness, tingling, loss of balance and history of seizures.    Psych       Denies anxiety and depression.    Endo       Denies weight change and thirsty all the time.    Heme       Denies easy bruising, bleeding and enlarged lymph nodes.    Vitals:    02/04/20 1311   BP: 149/77   Pulse: 63   Resp: 16   Temp: 98.3 °F (36.8 °C)   SpO2: 96%   Weight: 71.7 kg (158 lb)   Height: 154.9 cm (61\")     Physical Exam "   General  General appearance: well developed, well nourished, no acute distress  Gait and station: antalgic, walker    Mental Status Exam   Mental Status: AAO x3  Behavior: Appropriate    Cervical   ROM decreased w/ lateral rotation  Spurling's Test Negative  Palpation: Tender  Trapezius/LS  Comments: Right  paraspinal tenderness    Thoracolumbar   ROM: decreased rotation/extension  Straight Leg Raise: Negative  Palpation: Tender  Paravertebral    Muscle Stretch Reflex   Sensory Intact to light touch/pin prick    Neuro   Reflexes: R Arm 1+  L arm 1+  R Leg 1+  L Leg 1+    Strength: Arms Normal  Strength: Legs Normal     Eyes:      Clear conjunctivae,      Pupils rounds and reactive  ENT:      Clear oropharynx,       Mucosa moist/pink       Nose: no deformity  Chest Wall:      Non tender      No deformities or masses noted.    Respiratory:      Clear bilaterally to auscultation.        No dyspnea  Heart:      Regular rate and rhythm, no murmurs, rubs, or gallops   Pulses:      Pulses 2+, symmetric  Extremities:      No clubbing, cyanosis, edema, or deformity noted   Neurologic:      No focal deficits      Coordination intact with rapid alternating movement.  Skin:      Intact without lesions or rashes.  No mass  Cervical Nodes:      No adenopathy noted.    PHQ9 on chart                    opioid risk tool high risk        Assessment/Plan  Catalina was seen today for sciatica, back pain, leg pain and follow-up.    Diagnoses and all orders for this visit:    Chronic pain syndrome  -     Discontinue: HYDROcodone-acetaminophen (NORCO)  MG per tablet; Take 1 tablet by mouth 3 (Three) Times a Day As Needed for Severe Pain .  -     HYDROcodone-acetaminophen (NORCO)  MG per tablet; Take 1 tablet by mouth 3 (Three) Times a Day As Needed for Severe Pain .    Postlaminectomy syndrome of lumbar region  -     Discontinue: HYDROcodone-acetaminophen (NORCO)  MG per tablet; Take 1 tablet by mouth 3 (Three) Times a  Day As Needed for Severe Pain .  -     HYDROcodone-acetaminophen (NORCO)  MG per tablet; Take 1 tablet by mouth 3 (Three) Times a Day As Needed for Severe Pain .    Lumbar stenosis with neurogenic claudication    Polyarthralgia  -     Discontinue: HYDROcodone-acetaminophen (NORCO)  MG per tablet; Take 1 tablet by mouth 3 (Three) Times a Day As Needed for Severe Pain .  -     HYDROcodone-acetaminophen (NORCO)  MG per tablet; Take 1 tablet by mouth 3 (Three) Times a Day As Needed for Severe Pain .    High risk medication use    Other orders  -     gabapentin (NEURONTIN) 600 MG tablet; Take 1 tablet by mouth 4 (Four) Times a Day.    Summary:   79-year-old female with HTN, depression, chronic polyarthralgia from osteoarthritis, chronic back pain from DDD/ spondylosis/stenosis, S/p L4/5 laminectomy with bony fusion 7/2018, Dr. Drake seen in  follow-up.     Chronic lower lumbar /coccyx pain and RLE radicular pain, continued bilateral lower extremity weakness.  Here with niece who continues to help  manage her medication at home.  Considering assisted living facility.    Hydrocodone dose reduction last visit, tolerated well.  Increased pain at night.  Will increase gabapentin to 4 times daily.    Continue hydrocodone 10/325 3 times daily as needed for severe pain.  UDS and Inspect reviewed.   Discussed risk of tolerance, dependence, respiratory depression, coma and death associated with use of oral opioids for treatment of chronic nonmalignant pain.      RTC  in  2 mo.

## 2020-02-04 NOTE — TELEPHONE ENCOUNTER
With her increase of Gabapentin today to QID she wanted to make sure you sent a refill in for her.

## 2020-03-18 ENCOUNTER — TELEPHONE (OUTPATIENT)
Dept: PAIN MEDICINE | Facility: CLINIC | Age: 80
End: 2020-03-18

## 2020-03-18 RX ORDER — TIZANIDINE 4 MG/1
4 TABLET ORAL NIGHTLY PRN
Qty: 30 TABLET | Refills: 1 | Status: SHIPPED | OUTPATIENT
Start: 2020-03-18 | End: 2020-04-13

## 2020-03-19 ENCOUNTER — HOSPITAL ENCOUNTER (OUTPATIENT)
Dept: PAIN MEDICINE | Facility: HOSPITAL | Age: 80
Discharge: HOME | End: 2020-03-19

## 2020-03-19 PROCEDURE — 77003 FLUOROGUIDE FOR SPINE INJECT: CPT

## 2020-03-23 ENCOUNTER — TELEPHONE (OUTPATIENT)
Dept: PAIN MEDICINE | Facility: HOSPITAL | Age: 80
End: 2020-03-23

## 2020-03-23 NOTE — TELEPHONE ENCOUNTER
Patient called stating that her legs are jerking pretty bad, and was asking if you could send in a rx for her. Pts next appt with you is on 4/6.

## 2020-04-06 ENCOUNTER — TELEPHONE (OUTPATIENT)
Dept: PAIN MEDICINE | Facility: HOSPITAL | Age: 80
End: 2020-04-06

## 2020-04-06 NOTE — TELEPHONE ENCOUNTER
Pt's grandson Brandon called and wanted to know if his grandmother had to be seen today, he wasn't wanting to get her out of the house due to COVID?

## 2020-04-09 ENCOUNTER — HOSPITAL ENCOUNTER (EMERGENCY)
Facility: HOSPITAL | Age: 80
Discharge: HOME | End: 2020-04-09
Attending: EMERGENCY MEDICINE

## 2020-04-09 ENCOUNTER — APPOINTMENT (OUTPATIENT)
Dept: GENERAL RADIOLOGY | Facility: HOSPITAL | Age: 80
End: 2020-04-09

## 2020-04-09 VITALS
HEART RATE: 71 BPM | OXYGEN SATURATION: 94 % | RESPIRATION RATE: 12 BRPM | SYSTOLIC BLOOD PRESSURE: 153 MMHG | BODY MASS INDEX: 25.61 KG/M2 | DIASTOLIC BLOOD PRESSURE: 63 MMHG | TEMPERATURE: 97.8 F | WEIGHT: 150 LBS | HEIGHT: 64 IN

## 2020-04-09 DIAGNOSIS — M25.562 ACUTE PAIN OF LEFT KNEE: Primary | ICD-10-CM

## 2020-04-09 PROCEDURE — 99283 EMERGENCY DEPT VISIT LOW MDM: CPT

## 2020-04-09 PROCEDURE — 73562 X-RAY EXAM OF KNEE 3: CPT

## 2020-04-09 RX ORDER — HYDROCODONE BITARTRATE AND ACETAMINOPHEN 10; 325 MG/1; MG/1
1 TABLET ORAL ONCE AS NEEDED
Status: COMPLETED | OUTPATIENT
Start: 2020-04-09 | End: 2020-04-09

## 2020-04-09 RX ADMIN — HYDROCODONE BITARTRATE AND ACETAMINOPHEN 1 TABLET: 10; 325 TABLET ORAL at 05:34

## 2020-04-09 NOTE — ED PROVIDER NOTES
Subjective   79-year-old female with chronic bilateral lower extremity pain left knee pain complains of worsening left knee pain after moving her knee and feeling a pop earlier this evening.  Patient is out of her Lortab 10 mg for the past 2 weeks.  No fever, no weakness or numbness or saddle anesthesia or urinary symptoms.  Symptoms are moderate to severe, worse with movement.          Review of Systems   Constitutional: Negative.    Musculoskeletal:        As per HPI   Skin: Negative.    Neurological: Negative.        Past Medical History:   Diagnosis Date   • Arthritis    • Atrial fibrillation (CMS/HCC)    • Broken shoulder     left-- due to fall 11-7-19 was at [facility]   • Buttock pain     rt side   • Depression    • GERD (gastroesophageal reflux disease)    • H/O fall    • Hip pain     rt   • Hypertension    • Hypothyroidism    • Leg pain     rt   • Low back pain    • Occipital bone fracture (CMS/HCC)     left- due to fall   11-7-19 was inpt at [facility]       Allergies   Allergen Reactions   • Baclofen Rash   • Codeine Nausea Only   • Ibuprofen Unknown (See Comments)     Patient doesn't know----Motin   • Methocarbamol Unknown (See Comments)     Patient doesn't know   • Naproxen Unknown (See Comments)     Pt. Doesn't know    • Tizanidine Hcl Hallucinations   • Tolmetin Unknown (See Comments)     Pt. Doesn't know-- same as Tolectin       Past Surgical History:   Procedure Laterality Date   • BACK SURGERY  07/19/2018    PDC &  PSF L3-L5 insitu   • CHOLECYSTECTOMY     • COLONOSCOPY     • HYSTERECTOMY     • ROTATOR CUFF REPAIR Left        Family History   Problem Relation Age of Onset   • Cancer Father    • Diabetes Son    • Cancer Paternal Aunt    • Heart disease Paternal Aunt    • Cancer Paternal Uncle        Social History     Socioeconomic History   • Marital status:      Spouse name: Not on file   • Number of children: Not on file   • Years of education: Not on file   • Highest education level: Not on file    Tobacco Use   • Smoking status: Never Smoker   • Smokeless tobacco: Never Used   Substance and Sexual Activity   • Alcohol use: No   • Drug use: No   • Sexual activity: Defer           Objective   Physical Exam   Constitutional: She is oriented to person, place, and time. She appears well-developed and well-nourished.   HENT:   Head: Normocephalic and atraumatic.   Cardiovascular: Intact distal pulses.   Musculoskeletal:   Left lower extremity with normal inspection, no edema, decreased range of motion left knee secondary to pain, no overlying warmth or effusion, stable, nontender.  Full range of motion left ankle and hip with mild pain.   Neurological: She is alert and oriented to person, place, and time.   Motor and sensation intact bilateral lower extremities   Skin: Skin is warm and dry. Capillary refill takes less than 2 seconds.   Psychiatric: She has a normal mood and affect. Her behavior is normal.       Procedures           ED Course                                           MDM  Number of Diagnoses or Management Options  Acute pain of left knee:   Diagnosis management comments: No fracture per my read.  Patient calm, frustrated about her pain management situation, no overlying warmth/fever, no swelling.  Encouraged patient to follow up with her pain management doctor.       Amount and/or Complexity of Data Reviewed  Tests in the radiology section of CPT®: reviewed        Final diagnoses:   Acute pain of left knee            Mark Rios MD  04/09/20 0552

## 2020-04-09 NOTE — ED NOTES
"Pt states around 0130 she moved her knee and heard a \"pop\" pt is screaming upon assessment and refuses to move her leg or foot       Dilcia Reed RN  04/09/20 6203    "

## 2020-04-09 NOTE — ED NOTES
Attempting to contact family members to come pick her up, no answer at this time. Will continue to try to contact      Dilcia Reed RN  04/09/20 2300

## 2020-04-10 ENCOUNTER — TELEPHONE (OUTPATIENT)
Dept: PAIN MEDICINE | Facility: CLINIC | Age: 80
End: 2020-04-10

## 2020-04-10 DIAGNOSIS — G89.4 CHRONIC PAIN SYNDROME: ICD-10-CM

## 2020-04-10 DIAGNOSIS — G89.4 CHRONIC PAIN SYNDROME: Primary | ICD-10-CM

## 2020-04-10 RX ORDER — HYDROCODONE BITARTRATE AND ACETAMINOPHEN 10; 325 MG/1; MG/1
1 TABLET ORAL EVERY 8 HOURS PRN
Qty: 12 TABLET | Refills: 0 | Status: SHIPPED | OUTPATIENT
Start: 2020-04-10 | End: 2020-04-10 | Stop reason: SDUPTHER

## 2020-04-10 RX ORDER — HYDROCODONE BITARTRATE AND ACETAMINOPHEN 10; 325 MG/1; MG/1
1 TABLET ORAL EVERY 8 HOURS PRN
Qty: 12 TABLET | Refills: 0 | Status: SHIPPED | OUTPATIENT
Start: 2020-04-10 | End: 2020-04-13 | Stop reason: SDUPTHER

## 2020-04-10 NOTE — PROGRESS NOTES
Sent Hydrocodone 10/325 TID #12 to pharmacy to take until Monday.   Seen in ED yesterday for knee pain/neg xray. Missed last appointment. Continued concern with opioid misuse/high risk/will continue close monitoring. Schedule face to face evaluation on Monday.

## 2020-04-10 NOTE — TELEPHONE ENCOUNTER
Pt called office this morning.  She has fallen and her her leg/hip.  She says the pain is excrutiating and she can't live with it.  Her family took her to the er and they xrayed it and there is nothing broken.  They gave her one pain pill and sent her home and told her to call her pain mgnt dr.  Pt missed her appt on 4-6.  She wants to know if you can increase her pain meds???   83

## 2020-04-10 NOTE — TELEPHONE ENCOUNTER
Sent hydrocodone to [facility] #12 to take till Monday. Needs follow up before anymore refill. Will see on Monday as scheduled

## 2020-04-10 NOTE — TELEPHONE ENCOUNTER
[facility] in [place] is out of stock on hydrocodone. Can you send it to the [facility] in [place] please?

## 2020-04-13 ENCOUNTER — RESULTS ENCOUNTER (OUTPATIENT)
Dept: PAIN MEDICINE | Facility: CLINIC | Age: 80
End: 2020-04-13

## 2020-04-13 ENCOUNTER — OFFICE VISIT (OUTPATIENT)
Dept: PAIN MEDICINE | Facility: CLINIC | Age: 80
End: 2020-04-13

## 2020-04-13 VITALS
TEMPERATURE: 98.5 F | RESPIRATION RATE: 16 BRPM | SYSTOLIC BLOOD PRESSURE: 183 MMHG | BODY MASS INDEX: 25.61 KG/M2 | HEART RATE: 79 BPM | DIASTOLIC BLOOD PRESSURE: 78 MMHG | OXYGEN SATURATION: 99 % | WEIGHT: 150 LBS | HEIGHT: 64 IN

## 2020-04-13 DIAGNOSIS — Z79.899 HIGH RISK MEDICATION USE: ICD-10-CM

## 2020-04-13 DIAGNOSIS — M47.817 LUMBOSACRAL SPONDYLOSIS WITHOUT MYELOPATHY: ICD-10-CM

## 2020-04-13 DIAGNOSIS — G89.4 CHRONIC PAIN SYNDROME: Primary | ICD-10-CM

## 2020-04-13 DIAGNOSIS — M25.50 POLYARTHRALGIA: ICD-10-CM

## 2020-04-13 DIAGNOSIS — M96.1 POSTLAMINECTOMY SYNDROME OF LUMBAR REGION: ICD-10-CM

## 2020-04-13 PROCEDURE — 99214 OFFICE O/P EST MOD 30 MIN: CPT | Performed by: ANESTHESIOLOGY

## 2020-04-13 RX ORDER — HYDROCODONE BITARTRATE AND ACETAMINOPHEN 10; 325 MG/1; MG/1
1 TABLET ORAL 3 TIMES DAILY PRN
Qty: 90 TABLET | Refills: 0 | Status: SHIPPED | OUTPATIENT
Start: 2020-04-13 | End: 2020-04-13 | Stop reason: SDUPTHER

## 2020-04-13 RX ORDER — METHOCARBAMOL 500 MG/1
500 TABLET, FILM COATED ORAL 2 TIMES DAILY
Qty: 60 TABLET | Refills: 1 | Status: SHIPPED | OUTPATIENT
Start: 2020-04-13 | End: 2020-04-13

## 2020-04-13 RX ORDER — CYCLOBENZAPRINE HCL 10 MG
10 TABLET ORAL 2 TIMES DAILY PRN
Qty: 90 TABLET | Refills: 5 | Status: SHIPPED | OUTPATIENT
Start: 2020-04-13 | End: 2020-05-28

## 2020-04-13 RX ORDER — HYDROCODONE BITARTRATE AND ACETAMINOPHEN 10; 325 MG/1; MG/1
1 TABLET ORAL 3 TIMES DAILY PRN
Qty: 90 TABLET | Refills: 0 | Status: SHIPPED | OUTPATIENT
Start: 2020-04-13 | End: 2020-06-09 | Stop reason: SDUPTHER

## 2020-04-13 NOTE — PROGRESS NOTES
CC joints, back pain, jonah leg pain  79-year-old female with HTN, depression, chronic polyarthralgia from osteoarthritis, chronic back pain S/p L4-5 laminectomy with bony fusion 7/2018, Dr. Drake., here for follow-up.  Since last visit was seen in ED for new twisting left knee.  Negative x-ray.  Missed last appointment and was out of hydrocodone with worsening pain.  Chronic back pain bilateral lower extremity radicular pain and neurogenic claudication symptoms.       Chronic  low back pain and bilateral lower extremity pain worse with walking standing relieved by rest.  Denies saddle anesthesia, bladder or bowel incontinence.  Chronic polyarthralgia / left knee pain/ ilateral feet neuropathic pain    Still  limited in ADL due to bilateral lower extremity pain and weakness.  Ambulating with walker    Utilizes gabapentin, hydrocodone with good relief functional benefit without side effects.      L-Spine x-ray 2019 Degenerative changes of the lumbar spine with stable 6 mm anterolisthesis L4 upon L5  L-spine MRI 2019: postoperative changes are noted at L4 and L5 with laminectomies and posterior bony fusion. Degenerative changes are seen at multiple levels, greatest at L3-4, with moderate to severe spinal canal narrowing and moderate bilateral foraminal narrowing, greater on the left    C-spine CT 7/2017:  Degenerative changes spondylosis with central stenosis at C5-C6 and left foraminal stenosis C4/5    Pain Assessment   Location of Pain: Lower Back, R Hip, L Hip, R Leg, L Leg  Description of Pain: Dull/Aching, Throbbing, Stabbing  Previous Pain Rating : 8  Current Pain Rating: 10  Aggravating Factors: Activity  Alleviating Factors: Rest, Medication  Previous Treatments: Nerve Blocks/Injections, Epidural Steroids, Narcotic Pain Medication, Physical Therapy  Previous Diagnostic Studies: X-Ray, MRI    PEG Assessment   What number best describes your pain on average in the past week?8  What number best describes how, during  "the past week, pain has interfered with your enjoyment of life?8  What number best describes how, during the past week, pain has interfered with your general activity? 10     has a past medical history of Arthritis, Atrial fibrillation (CMS/HCC), Broken shoulder, Buttock pain, Depression, GERD (gastroesophageal reflux disease), H/O fall, Hip pain, Hypertension, Hypothyroidism, Leg pain, Low back pain, and Occipital bone fracture (CMS/HCC).     has a past surgical history that includes Hysterectomy; Rotator cuff repair (Left); Cholecystectomy; Back surgery (07/19/2018); and Colonoscopy.  .  Social History     Tobacco Use   • Smoking status: Never Smoker   • Smokeless tobacco: Never Used   Substance Use Topics   • Alcohol use: No       Review of Systems        See HPI    General       Denies fever/chills, fatigue and sleep problems.    Eyes       Denies blurry vision and double vision.    ENT       Denies decreased hearing, sore throat and ears ringing.    CV       Denies chest pain and fainting.    Resp       Denies shortness of breath and cough.    GI       Denies heartburn, constipation, nausea, vomiting and diarrhea.           Denies pain on urination, incontinence and increased frequency.    MS       Complains of joint swelling and cramps.       Denies weakness.       Right shoulder pain, low back pain, bilateral hip pain left knee.    Derm       Denies rash and itching.    Neuro       Denies numbness, tingling, loss of balance and history of seizures.    Psych       Denies anxiety and depression.    Endo       Denies weight change and thirsty all the time.    Heme       Denies easy bruising, bleeding and enlarged lymph nodes.    Vitals:    04/13/20 1339   BP: (!) 183/78   BP Location: Left arm   Patient Position: Sitting   Cuff Size: Adult   Pulse: 79   Resp: 16   Temp: 98.5 °F (36.9 °C)   TempSrc: Oral   SpO2: 99%   Weight: 68 kg (150 lb)   Height: 162.6 cm (64\")     Physical Exam   General  General " appearance: well developed, well nourished, no acute distress  Gait and station: antalgic, walker    Mental Status Exam   Mental Status: AAO x3  Behavior: Appropriate    Cervical   ROM decreased w/ lateral rotation  Spurling's Test Negative  Palpation: Tender  Trapezius/LS  Comments: Right  paraspinal tenderness    Thoracolumbar   ROM: decreased rotation/extension  Straight Leg Raise: Negative  Palpation: Tender  Paravertebral    Muscle Stretch Reflex   Sensory Intact to light touch/pin prick    Neuro   Reflexes: R Arm 1+  L arm 1+  R Leg 1+  L Leg 1+    Strength: Arms Normal  Strength: Legs Normal     Eyes:      Clear conjunctivae,      Pupils rounds and reactive  ENT:      Clear oropharynx,       Mucosa moist/pink       Nose: no deformity  Chest Wall:      Non tender      No deformities or masses noted.    Respiratory:      Clear bilaterally to auscultation.        No dyspnea  Heart:      Regular rate and rhythm, no murmurs, rubs, or gallops   Pulses:      Pulses 2+, symmetric  Extremities:      No clubbing, cyanosis, edema, or deformity noted   Neurologic:      No focal deficits      Coordination intact with rapid alternating movement.  Skin:      Intact without lesions or rashes.  No mass  Cervical Nodes:      No adenopathy noted.    PHQ9 on chart                    opioid risk tool high risk        Assessment/Plan  Catalina was seen today for leg pain, back pain and follow-up.    Diagnoses and all orders for this visit:    Chronic pain syndrome  -     Discontinue: HYDROcodone-acetaminophen (NORCO)  MG per tablet; Take 1 tablet by mouth 3 (Three) Times a Day As Needed for Severe Pain .  -     HYDROcodone-acetaminophen (NORCO)  MG per tablet; Take 1 tablet by mouth 3 (Three) Times a Day As Needed for Severe Pain .    Lumbosacral spondylosis without myelopathy    Postlaminectomy syndrome of lumbar region  -     Discontinue: HYDROcodone-acetaminophen (NORCO)  MG per tablet; Take 1 tablet by mouth 3  (Three) Times a Day As Needed for Severe Pain .  -     HYDROcodone-acetaminophen (NORCO)  MG per tablet; Take 1 tablet by mouth 3 (Three) Times a Day As Needed for Severe Pain .    Polyarthralgia  -     Discontinue: HYDROcodone-acetaminophen (NORCO)  MG per tablet; Take 1 tablet by mouth 3 (Three) Times a Day As Needed for Severe Pain .  -     HYDROcodone-acetaminophen (NORCO)  MG per tablet; Take 1 tablet by mouth 3 (Three) Times a Day As Needed for Severe Pain .    High risk medication use    Other orders  -     Discontinue: methocarbamol (ROBAXIN) 500 MG tablet; Take 1 tablet by mouth 2 (Two) Times a Day.  -     cyclobenzaprine (FLEXERIL) 10 MG tablet; Take 1 tablet by mouth 2 (Two) Times a Day As Needed for Muscle Spasms.    Summary:   79-year-old female with HTN, depression, chronic polyarthralgia from osteoarthritis, chronic back pain from DDD/ spondylosis/stenosis, S/p L4/5 laminectomy with bony fusion 7/2018, Dr. Drake seen in  follow-up.     Chronic lower lumbar /coccyx pain and RLE radicular pain, continued bilateral lower extremity weakness.    Reports worsening myofascial pain.  Tried Zanaflex without relief.  Will try cyclobenzaprine.    Continue hydrocodone 10/325 3 times daily as needed for severe pain.  UDS and Inspect reviewed.   Discussed risk of tolerance, dependence, respiratory depression, coma and death associated with use of oral opioids for treatment of chronic nonmalignant pain.      Discussed CDC guidelines/precautions regarding coronavirus    RTC  in  2 mo.

## 2020-05-28 ENCOUNTER — OFFICE VISIT (OUTPATIENT)
Dept: CARDIOLOGY | Facility: CLINIC | Age: 80
End: 2020-05-28

## 2020-05-28 VITALS
WEIGHT: 168 LBS | OXYGEN SATURATION: 97 % | HEART RATE: 75 BPM | DIASTOLIC BLOOD PRESSURE: 69 MMHG | SYSTOLIC BLOOD PRESSURE: 163 MMHG | HEIGHT: 64 IN | BODY MASS INDEX: 28.68 KG/M2

## 2020-05-28 DIAGNOSIS — I50.33 ACUTE ON CHRONIC DIASTOLIC CONGESTIVE HEART FAILURE (HCC): ICD-10-CM

## 2020-05-28 DIAGNOSIS — I48.0 PAROXYSMAL ATRIAL FIBRILLATION (HCC): ICD-10-CM

## 2020-05-28 DIAGNOSIS — I36.1 NONRHEUMATIC TRICUSPID VALVE REGURGITATION: ICD-10-CM

## 2020-05-28 DIAGNOSIS — I10 ESSENTIAL HYPERTENSION: Primary | ICD-10-CM

## 2020-05-28 PROCEDURE — 99214 OFFICE O/P EST MOD 30 MIN: CPT | Performed by: INTERNAL MEDICINE

## 2020-05-28 RX ORDER — METHOCARBAMOL 500 MG/1
500 TABLET, FILM COATED ORAL 2 TIMES DAILY
COMMUNITY
Start: 2020-05-02 | End: 2021-03-31

## 2020-05-28 NOTE — PROGRESS NOTES
Subjective:     Encounter Date:05/28/2020      Patient ID: Catalina Moreno is a 80 y.o. female.    Chief Complaint: Hypertension, Atrial Fibrillation, & CHF  History of Present Illness     80-year-old white female patient with history of paroxysmal atrial fibrillation hypertension problems with fluid overload is back for follow-up  Patient had a stress Myoview January 2019 which showed no ischemia  Echocardiogram was done recently in June 2019 which showed LVEF 60% moderate to severe pulmonary hypertension moderate to severe tricuspid insufficiency RV enlargement    She is having some venous insufficiency  Advised tight stockings  Continue anticoagulation       Patient underwent stress Myoview January 2020 which showed fixed inferior wall defect  Patient main problem is increasing shortness of breath feeling tired and fatigued without any chest pain  Suggest repeating the echocardiogram patient needs sleep study done  Suggest pulmonary evaluation    The following portions of the patient's history were reviewed and updated as appropriate: Allergies current medications past family history past medical history past social history past surgical history problem list and review of systems  Past Medical History:   Diagnosis Date   • Arthritis    • Atrial fibrillation (CMS/HCC)    • Broken shoulder     left-- due to fall 11-7-19 was at [facility]   • Buttock pain     rt side   • Depression    • GERD (gastroesophageal reflux disease)    • H/O fall    • Hip pain     rt   • Hypertension    • Hypothyroidism    • Leg pain     rt   • Low back pain    • Occipital bone fracture (CMS/HCC)     left- due to fall   11-7-19 was inpt at [facility]     Past Surgical History:   Procedure Laterality Date   • BACK SURGERY  07/19/2018    PDC &  PSF L3-L5 insitu   • CHOLECYSTECTOMY     • COLONOSCOPY     • HYSTERECTOMY     • ROTATOR CUFF REPAIR Left      /69 (BP Location: Left arm, Patient Position: Sitting, Cuff Size: Adult)   Pulse 75    "Ht 162.6 cm (64\")   Wt 76.2 kg (168 lb)   SpO2 97%   BMI 28.84 kg/m²   Family History   Problem Relation Age of Onset   • Cancer Father    • Diabetes Son    • Cancer Paternal Aunt    • Heart disease Paternal Aunt    • Cancer Paternal Uncle        Current Outpatient Medications:   •  ELIQUIS 5 MG tablet tablet, Take 5 mg by mouth 2 (Two) Times a Day., Disp: , Rfl: 0  •  famotidine (PEPCID) 20 MG tablet, Take 20 mg by mouth 2 (Two) Times a Day., Disp: , Rfl: 0  •  furosemide (LASIX) 20 MG tablet, Take 20 mg by mouth Daily., Disp: , Rfl: 0  •  gabapentin (NEURONTIN) 600 MG tablet, Take 1 tablet by mouth 4 (Four) Times a Day., Disp: 120 tablet, Rfl: 5  •  HYDROcodone-acetaminophen (NORCO)  MG per tablet, Take 1 tablet by mouth 3 (Three) Times a Day As Needed for Severe Pain ., Disp: 90 tablet, Rfl: 0  •  levothyroxine (SYNTHROID, LEVOTHROID) 75 MCG tablet, Take 75 mcg by mouth Daily., Disp: , Rfl: 0  •  methocarbamol (ROBAXIN) 500 MG tablet, Take 500 mg by mouth 2 (Two) Times a Day., Disp: , Rfl:   •  metoprolol succinate XL (TOPROL-XL) 50 MG 24 hr tablet, Take 50 mg by mouth Daily., Disp: , Rfl:   •  pantoprazole (PROTONIX) 40 MG EC tablet, Take 40 mg by mouth Daily., Disp: , Rfl: 0  Social History     Socioeconomic History   • Marital status:      Spouse name: Not on file   • Number of children: Not on file   • Years of education: Not on file   • Highest education level: Not on file   Tobacco Use   • Smoking status: Never Smoker   • Smokeless tobacco: Never Used   Substance and Sexual Activity   • Alcohol use: No   • Drug use: No   • Sexual activity: Defer     Allergies   Allergen Reactions   • Baclofen Rash   • Codeine Nausea Only   • Ibuprofen Unknown (See Comments)     Patient doesn't know----Motin   • Methocarbamol Unknown (See Comments)     Patient doesn't know   • Naproxen Unknown (See Comments)     Pt. Doesn't know    • Tizanidine Hcl Hallucinations   • Tolmetin Unknown (See Comments)     Pt. " Doesn't know-- same as Tolectin     Review of Systems   Constitution: Positive for malaise/fatigue. Negative for fever.   HENT: Negative for congestion and hearing loss.    Eyes: Negative for double vision and visual disturbance.   Cardiovascular: Negative for chest pain, claudication, dyspnea on exertion, leg swelling and syncope.   Respiratory: Negative for cough and shortness of breath.    Endocrine: Negative for cold intolerance.   Skin: Negative for color change and rash.   Musculoskeletal: Negative for arthritis and joint pain.   Gastrointestinal: Positive for nausea. Negative for abdominal pain and heartburn.   Genitourinary: Negative for hematuria.   Neurological: Positive for dizziness, numbness and weakness. Negative for excessive daytime sleepiness.   Psychiatric/Behavioral: Negative for depression. The patient is not nervous/anxious.    All other systems reviewed and are negative.             Objective:     Physical Exam   Constitutional: She is oriented to person, place, and time. She appears well-developed and well-nourished. She is cooperative.   HENT:   Head: Normocephalic and atraumatic.   Mouth/Throat: Uvula is midline and oropharynx is clear and moist. No oral lesions.   Eyes: Conjunctivae are normal. No scleral icterus.   Neck: Trachea normal. Neck supple. No JVD present. Carotid bruit is not present. No thyromegaly present.   Cardiovascular: Normal rate, regular rhythm, S1 normal, S2 normal, normal heart sounds, intact distal pulses and normal pulses. PMI is not displaced. Exam reveals no gallop and no friction rub.   No murmur heard.  Pulmonary/Chest: Effort normal and breath sounds normal.   Abdominal: Soft. Bowel sounds are normal.   Musculoskeletal: Normal range of motion.   Neurological: She is alert and oriented to person, place, and time. She has normal strength.   No focal deficits   Skin: Skin is warm. No cyanosis.   Psychiatric: She has a normal mood and affect.       Procedures    Lab  Review:       Assessment:          Diagnosis Plan   1. Essential hypertension     2. Acute on chronic diastolic congestive heart failure (CMS/HCC)     3. Nonrheumatic tricuspid valve regurgitation            Plan:         Uncontrolled hypertension advised salt reduction weight loss needs to adjust the medicines if blood pressure continues to be elevated  History of diastolic congestive heart failure appears to be euvolemic  Severe pulmonary hypertension worsening shortness of breath we will repeat the echocardiogram patient needs pulmonary evaluation  Continue anticoagulation

## 2020-06-09 ENCOUNTER — OFFICE VISIT (OUTPATIENT)
Dept: PAIN MEDICINE | Facility: CLINIC | Age: 80
End: 2020-06-09

## 2020-06-09 ENCOUNTER — TELEPHONE (OUTPATIENT)
Dept: CARDIOLOGY | Facility: CLINIC | Age: 80
End: 2020-06-09

## 2020-06-09 VITALS — HEIGHT: 64 IN | WEIGHT: 155 LBS | BODY MASS INDEX: 26.46 KG/M2

## 2020-06-09 DIAGNOSIS — G89.4 CHRONIC PAIN SYNDROME: Primary | ICD-10-CM

## 2020-06-09 DIAGNOSIS — M25.50 POLYARTHRALGIA: ICD-10-CM

## 2020-06-09 DIAGNOSIS — Z79.899 HIGH RISK MEDICATION USE: ICD-10-CM

## 2020-06-09 DIAGNOSIS — M96.1 POSTLAMINECTOMY SYNDROME OF LUMBAR REGION: ICD-10-CM

## 2020-06-09 DIAGNOSIS — M54.16 LUMBAR RADICULITIS: ICD-10-CM

## 2020-06-09 PROCEDURE — 99443 PR PHYS/QHP TELEPHONE EVALUATION 21-30 MIN: CPT | Performed by: ANESTHESIOLOGY

## 2020-06-09 RX ORDER — HYDROCODONE BITARTRATE AND ACETAMINOPHEN 7.5; 325 MG/1; MG/1
1 TABLET ORAL 4 TIMES DAILY PRN
Qty: 120 TABLET | Refills: 0 | Status: SHIPPED | OUTPATIENT
Start: 2020-06-09 | End: 2020-06-26 | Stop reason: SDUPTHER

## 2020-06-09 RX ORDER — LIDOCAINE 50 MG/G
2 PATCH TOPICAL EVERY 24 HOURS
Qty: 60 PATCH | Refills: 11 | Status: SHIPPED | OUTPATIENT
Start: 2020-06-09 | End: 2021-03-31

## 2020-06-09 NOTE — PROGRESS NOTES
CC back pain, jonah leg pain  80-year-old female with HTN, depression, chronic polyarthralgia from osteoarthritis, chronic back pain S/p L4-5 laminectomy with bony fusion 2018/Noelle., here for follow-up by tel.  Worsening back/BLE pain from post laminectomy syndrome and lumbar stenosis. States Hydrocodone helps but not lasting long.   Chronic back pain bilateral lower extremity radicular pain and neurogenic claudication symptoms.  Denies saddle anesthesia, bladder or bowel incontinence.  Chronic polyarthralgia / left knee pain/ bilateral feet neuropathic pain    Still  limited in ADL due to bilateral lower extremity pain and weakness.  Ambulating with walker    Utilizes gabapentin, hydrocodone with good relief functional benefit without side effects.      L-Spine x-ray 2019 Degenerative changes of the lumbar spine with stable 6 mm anterolisthesis L4 upon L5  L-spine MRI 2019: postoperative changes are noted at L4 and L5 with laminectomies and posterior bony fusion. Degenerative changes are seen at multiple levels, greatest at L3-4, with moderate to severe spinal canal narrowing and moderate bilateral foraminal narrowing, greater on the left    C-spine CT 2017:  Degenerative changes spondylosis with central stenosis at C5-C6 and left foraminal stenosis C4/5    Pain Assessment   Location of Pain: Lower Back, R Hip, L Hip, R Leg, L Leg  Description of Pain: Dull/Aching, Throbbing, Stabbing  Previous Pain Rating : 8  Current Pain Ratin  Aggravating Factors: Activity  Alleviating Factors: Rest, Medication  Previous Treatments: Nerve Blocks/Injections, Epidural Steroids, Narcotic Pain Medication, Physical Therapy  Previous Diagnostic Studies: X-Ray, MRI    PEG Assessment   What number best describes your pain on average in the past week?10  What number best describes how, during the past week, pain has interfered with your enjoyment of life?8  What number best describes how, during the past week, pain has interfered  "with your general activity? 10     has a past medical history of Arthritis, Atrial fibrillation (CMS/HCC), Broken shoulder, Buttock pain, Depression, GERD (gastroesophageal reflux disease), H/O fall, Hip pain, Hypertension, Hypothyroidism, Leg pain, Low back pain, and Occipital bone fracture (CMS/HCC).     has a past surgical history that includes Hysterectomy; Rotator cuff repair (Left); Cholecystectomy; Back surgery (07/19/2018); and Colonoscopy.  .  Social History     Tobacco Use   • Smoking status: Never Smoker   • Smokeless tobacco: Never Used   Substance Use Topics   • Alcohol use: No       Review of Systems        See HPI    General       Denies fever/chills, fatigue and sleep problems.    Eyes       Denies blurry vision and double vision.    ENT       Denies decreased hearing, sore throat and ears ringing.    CV       Denies chest pain and fainting.    Resp       Denies shortness of breath and cough.    GI       Denies heartburn, constipation, nausea, vomiting and diarrhea.           Denies pain on urination, incontinence and increased frequency.    MS       Complains of joint swelling and cramps.       Denies weakness.       Right shoulder pain, low back pain, bilateral hip pain left knee.    Derm       Denies rash and itching.    Neuro       Denies numbness, tingling, loss of balance and history of seizures.    Psych       Denies anxiety and depression.    Endo       Denies weight change and thirsty all the time.    Heme       Denies easy bruising, bleeding and enlarged lymph nodes.    Vitals:    06/09/20 0849   Weight: 70.3 kg (155 lb)   Height: 162.6 cm (64\")     Physical Exam   NAD.    PHQ9 on chart                    opioid risk tool high risk        Assessment/Plan  Catalina was seen today for back pain, arthritis and follow-up.    Diagnoses and all orders for this visit:    Chronic pain syndrome  -     HYDROcodone-acetaminophen (NORCO) 7.5-325 MG per tablet; Take 1 tablet by mouth 4 (Four) Times a " Day As Needed for Severe Pain .    Postlaminectomy syndrome of lumbar region    Lumbar radiculitis  -     Ambulatory Referral to Pain Management    Polyarthralgia    High risk medication use    Other orders  -     lidocaine (LIDODERM) 5 %; Place 2 patches on the skin as directed by provider Daily. Remove & Discard patch within 12 hours or as directed by MD    Summary:   80-year-old female with HTN, depression, chronic polyarthralgia from osteoarthritis, chronic back pain from DDD/ spondylosis/stenosis, S/p L4/5 laminectomy with bony fusion 7/2018/ Majd seen in  follow-up.     Chronic lower lumbar /coccyx pain and RLE radicular pain, continued bilateral lower extremity weakness.    Worsening back/BLE pain from post laminectomy syndrome and lumbar stenosis.   Will schedule caudal STEPHAN. Needs to be off Eliquis 3 days.     Hydrocodone not lasting long. But concern for polypharmacy. Will change to 7.5/325 4 times daily.   Continue hydrocodone 7.5/325 4 times daily as needed for severe pain.  UDS and Inspect reviewed.   Discussed risk of tolerance, dependence, respiratory depression, coma and death associated with use of oral opioids for treatment of chronic nonmalignant pain.      Start Lidocaine patch for worsening myofascial and neuropathic pain.     telemedicine done to avoid coronavirus exposure.  Discussed CDC guidelines and precaution regarding current epidemic.  Unable to complete visit using a video connection to the patient. A phone visit was used to complete visit. Total time  25 minutes    RTC  in  2 mo.

## 2020-06-09 NOTE — TELEPHONE ENCOUNTER
[facility] mgmt called requesting clearance on patient to hold eliquis starting tomorrow. Advised Dr. Alcantara is in satellite office today. Suggested a faxed clearance request but again advised he is not in the office today to sign. [email or web address]

## 2020-06-11 ENCOUNTER — TELEPHONE (OUTPATIENT)
Dept: CARDIOLOGY | Facility: CLINIC | Age: 80
End: 2020-06-11

## 2020-06-11 NOTE — TELEPHONE ENCOUNTER
Dr. Worthy  Fax [phone] Phone [phone]  Injections w/pain management 6/26/2020  Hold Eliquis 3 days prior  LOV 5/28/2020

## 2020-06-18 PROCEDURE — 93306 TTE W/DOPPLER COMPLETE: CPT | Performed by: INTERNAL MEDICINE

## 2020-06-24 ENCOUNTER — TELEPHONE (OUTPATIENT)
Dept: PAIN MEDICINE | Facility: HOSPITAL | Age: 80
End: 2020-06-24

## 2020-06-24 DIAGNOSIS — G89.4 CHRONIC PAIN SYNDROME: Primary | ICD-10-CM

## 2020-06-24 RX ORDER — DIAZEPAM 10 MG/1
10 TABLET ORAL ONCE
Qty: 1 TABLET | Refills: 0 | Status: SHIPPED | OUTPATIENT
Start: 2020-06-24 | End: 2020-06-24

## 2020-06-26 ENCOUNTER — HOSPITAL ENCOUNTER (OUTPATIENT)
Dept: PAIN MEDICINE | Facility: HOSPITAL | Age: 80
Discharge: HOME | End: 2020-06-26
Admitting: ANESTHESIOLOGY

## 2020-06-26 ENCOUNTER — HOSPITAL ENCOUNTER (OUTPATIENT)
Dept: PAIN MEDICINE | Facility: HOSPITAL | Age: 80
Discharge: HOME | End: 2020-06-26

## 2020-06-26 VITALS
HEART RATE: 68 BPM | HEIGHT: 64 IN | BODY MASS INDEX: 27.14 KG/M2 | WEIGHT: 159 LBS | SYSTOLIC BLOOD PRESSURE: 130 MMHG | DIASTOLIC BLOOD PRESSURE: 53 MMHG | OXYGEN SATURATION: 98 % | RESPIRATION RATE: 18 BRPM | TEMPERATURE: 97.7 F

## 2020-06-26 DIAGNOSIS — M54.16 LUMBAR RADICULITIS: Primary | ICD-10-CM

## 2020-06-26 DIAGNOSIS — M54.50 LOWER BACK PAIN: ICD-10-CM

## 2020-06-26 DIAGNOSIS — G89.4 CHRONIC PAIN SYNDROME: ICD-10-CM

## 2020-06-26 PROCEDURE — 0 IOPAMIDOL 41 % SOLUTION: Performed by: ANESTHESIOLOGY

## 2020-06-26 PROCEDURE — 25010000002 METHYLPREDNISOLONE PER 40 MG

## 2020-06-26 PROCEDURE — 77003 FLUOROGUIDE FOR SPINE INJECT: CPT

## 2020-06-26 PROCEDURE — 62323 NJX INTERLAMINAR LMBR/SAC: CPT | Performed by: ANESTHESIOLOGY

## 2020-06-26 RX ORDER — METHYLPREDNISOLONE ACETATE 40 MG/ML
INJECTION, SUSPENSION INTRA-ARTICULAR; INTRALESIONAL; INTRAMUSCULAR; SOFT TISSUE
Status: DISPENSED
Start: 2020-06-26 | End: 2020-06-26

## 2020-06-26 RX ORDER — METHYLPREDNISOLONE ACETATE 40 MG/ML
40 INJECTION, SUSPENSION INTRA-ARTICULAR; INTRALESIONAL; INTRAMUSCULAR; SOFT TISSUE ONCE
Status: COMPLETED | OUTPATIENT
Start: 2020-06-26 | End: 2020-06-26

## 2020-06-26 RX ORDER — BUPIVACAINE HYDROCHLORIDE 2.5 MG/ML
10 INJECTION, SOLUTION INFILTRATION; PERINEURAL ONCE
Status: DISCONTINUED | OUTPATIENT
Start: 2020-06-26 | End: 2020-06-27 | Stop reason: HOSPADM

## 2020-06-26 RX ORDER — HYDROCODONE BITARTRATE AND ACETAMINOPHEN 7.5; 325 MG/1; MG/1
1 TABLET ORAL 4 TIMES DAILY PRN
Qty: 120 TABLET | Refills: 0 | Status: SHIPPED | OUTPATIENT
Start: 2020-07-09 | End: 2020-07-31 | Stop reason: SDUPTHER

## 2020-06-26 RX ORDER — TRAZODONE HYDROCHLORIDE 50 MG/1
25 TABLET ORAL NIGHTLY
COMMUNITY
Start: 2020-06-04 | End: 2021-12-03

## 2020-06-26 RX ADMIN — IOPAMIDOL 3 ML: 408 INJECTION, SOLUTION INTRATHECAL at 10:15

## 2020-06-26 RX ADMIN — METHYLPREDNISOLONE ACETATE 40 MG: 40 INJECTION, SUSPENSION INTRA-ARTICULAR; INTRALESIONAL; INTRAMUSCULAR; SOFT TISSUE at 10:15

## 2020-06-26 NOTE — ADDENDUM NOTE
Encounter addended by: Jazz Austin RN on: 6/26/2020 11:17 AM   Actions taken: Visit Navigator Flowsheet section accepted

## 2020-06-26 NOTE — PROCEDURES
"Subjective    Cc back pain/BLE pain  Catalina Moreno is a 80 y.o. female with lumbar radiculitis postlaminectomy syndrome here for LESI.  Off Eliquis 30 days.     Pain Assessment   Location of Pain: Lower Back, R Hip, L Hip, L Leg, neck pain, joint  Description of Pain: Dull/Aching, Throbbing, Stabbing  Previous Pain Rating :8  Current Pain Ratin  Aggravating Factors: Activity  Alleviating Factors: Rest, Medication    The following portions of the patient's history were reviewed and updated as appropriate: allergies, current medications, past family history, past medical history, past social history, past surgical history and problem list.    Review of Systems  AS in HPI  Objective   Physical Exam   Constitutional: She is oriented to person, place, and time. She appears well-developed. No distress.   Cardiovascular: Normal rate.   Pulmonary/Chest: Effort normal.   Musculoskeletal:        Lumbar back: She exhibits decreased range of motion and tenderness.   Neurological: She is alert and oriented to person, place, and time.   Psychiatric: She has a normal mood and affect.     /76 (BP Location: Left arm, Patient Position: Sitting)   Pulse 79   Temp 97.7 °F (36.5 °C) (Skin)   Resp 16   Ht 162.6 cm (64\")   Wt 72.1 kg (159 lb)   SpO2 98%   BMI 27.29 kg/m²     Assessment/Plan     Underwent LESI.  RTC 6 weeks  Refill hydrocodone.  UDS and inspect reviewed    DATE OF PROCEDURE: 2020    PREOPERATIVE DIAGNOSIS: lumbar DDD/radiculitis    POSTOPERATIVE DIAGNOSIS: same    PROCEDURE PERFORMED:  lumbar Epidural Steroid Injection    The patient presents with a history of   lumbar degenerative disc disease with  radiculitis. The patient presents today for a  lumbar epidural steroid injection at level  L5/S1.   The patient was seen in the preoperative area.  Patient's consent was obtained and updated.  Vitals were taken.  Patient was then brought to the procedure suite and placed in a prone position. The " appropriate anatomic area was widely prepped with Chloraprep and draped in a sterile fashion. Noninvasive monitoring per routine anesthesia protocol was placed.  Under fluoroscopic guidance using  AP view a 20 gauge styleted tuohy needle was passed through skin anesthetized with 1% Lidocaine without epinephrine.  The needle was advanced using the continuous loss of resistance to saline technique into the L5 epidural space. Needle tip placement in the epidural space was confirmed by loss of resistance and injection of 1 mL of  preservative free contrast.  Following this 8 mL of a solution containing  1 mL of 40 mg Depo-Medrol and 7 mL of preservative-free saline was carefully administered in the epidural space.  A sterile dressing was placed over the puncture site.    The patient tolerated the procedure with no complications . They were then brought to the post procedure area where they recovered nicely.

## 2020-06-30 ENCOUNTER — TELEPHONE (OUTPATIENT)
Dept: CARDIOLOGY | Facility: CLINIC | Age: 80
End: 2020-06-30

## 2020-06-30 DIAGNOSIS — I48.91 ATRIAL FIBRILLATION, UNSPECIFIED TYPE (HCC): Primary | ICD-10-CM

## 2020-07-01 ENCOUNTER — TELEPHONE (OUTPATIENT)
Dept: CARDIOLOGY | Facility: CLINIC | Age: 80
End: 2020-07-01

## 2020-07-01 DIAGNOSIS — I48.91 ATRIAL FIBRILLATION, UNSPECIFIED TYPE (HCC): Primary | ICD-10-CM

## 2020-07-01 DIAGNOSIS — Z79.01 LONG TERM (CURRENT) USE OF ANTICOAGULANTS: ICD-10-CM

## 2020-07-01 DIAGNOSIS — I48.0 PAROXYSMAL ATRIAL FIBRILLATION (HCC): ICD-10-CM

## 2020-07-01 RX ORDER — WARFARIN SODIUM 4 MG/1
TABLET ORAL
Qty: 30 TABLET | Refills: 0 | Status: SHIPPED | OUTPATIENT
Start: 2020-07-01 | End: 2020-08-17

## 2020-07-01 NOTE — TELEPHONE ENCOUNTER
Okay with Coumadin  Please let the patient know the echocardiogram appears to be same as last time  Make sure she is seeing pulmonary regarding pulmonary hypertension  Make a follow-up visit with me in the next couple of weeks

## 2020-07-01 NOTE — TELEPHONE ENCOUNTER
Pt is requesting echo results.      Pt states that she is not taking Eliquis for the last week due to recent steroid injections. She was advised to start Coumadin this evening and to have labs-INR checked on Monday, she will go to [facility]. Patient voiced understanding.

## 2020-07-02 NOTE — TELEPHONE ENCOUNTER
Pt does not see a Pulmonologist, would you like to make a referral?    Pt made aware of echo and voiced understanding.

## 2020-07-02 NOTE — TELEPHONE ENCOUNTER
Yes please if she lives in [place] she needs to see Dr. savage otherwise she can go to [facility]

## 2020-07-07 NOTE — TELEPHONE ENCOUNTER
Called patient to verify where she would like to go to see a doctor, either [facility] or [facility], but patient states that she is having some major back problems and she is in the beginning stages of seeing a spine specialist and she does not think that she should schedule any appts to see any other physicians right now until she finds out what is going on with her back. She states that she will call us back when she is ready.

## 2020-07-24 ENCOUNTER — RESULTS ENCOUNTER (OUTPATIENT)
Dept: CARDIOLOGY | Facility: CLINIC | Age: 80
End: 2020-07-24

## 2020-07-24 DIAGNOSIS — Z79.01 LONG TERM (CURRENT) USE OF ANTICOAGULANTS: ICD-10-CM

## 2020-07-24 DIAGNOSIS — I48.0 PAROXYSMAL ATRIAL FIBRILLATION (HCC): ICD-10-CM

## 2020-07-27 ENCOUNTER — TELEPHONE (OUTPATIENT)
Dept: PAIN MEDICINE | Facility: HOSPITAL | Age: 80
End: 2020-07-27

## 2020-07-28 ENCOUNTER — TELEPHONE (OUTPATIENT)
Dept: CARDIOLOGY | Facility: CLINIC | Age: 80
End: 2020-07-28

## 2020-07-28 DIAGNOSIS — R00.2 PALPITATIONS: ICD-10-CM

## 2020-07-28 DIAGNOSIS — I48.0 PAROXYSMAL ATRIAL FIBRILLATION (HCC): Primary | ICD-10-CM

## 2020-07-28 RX ORDER — ROPINIROLE 0.25 MG/1
0.25 TABLET, FILM COATED ORAL NIGHTLY
Qty: 30 TABLET | Refills: 1 | Status: SHIPPED | OUTPATIENT
Start: 2020-07-28 | End: 2020-10-06

## 2020-07-28 NOTE — TELEPHONE ENCOUNTER
Pt called and left a voicemail stating her Eliquis was changed to Warfarin BUT she can't take it, it is making her dizzy.     Please advise.

## 2020-07-29 NOTE — TELEPHONE ENCOUNTER
Called and spoke with patient. She said she can't function with the dizziness of Warfarin and she can not afford the Eliquis. She is on a limited income and that medication is over $100/mo.     I offered her samples to be sent to [place] tomorrow but she did not take any medication yesterday and she's not taking it today either.   She can't drive to  and lives 25 mins away from [place] also she said.     We have limited samples also- wouldn't get her far.     Should she be seen tomorrow to discuss options or could you please call her?    [phone]

## 2020-07-29 NOTE — TELEPHONE ENCOUNTER
Spoke to pt,states she is so dizzy she cannot drive to go get samples of eliquis and is not taking coumadin right now- I advised her to make an appt with her PCP and see if she can get a ride to make sure there isn't something else going on. Also gave her information about patient assistance for Eliquis.

## 2020-07-29 NOTE — TELEPHONE ENCOUNTER
I agree if she is having significant dizziness she needs to be seen by PCP or come to the ER immediately

## 2020-07-31 ENCOUNTER — OFFICE VISIT (OUTPATIENT)
Dept: PAIN MEDICINE | Facility: CLINIC | Age: 80
End: 2020-07-31

## 2020-07-31 DIAGNOSIS — G89.4 CHRONIC PAIN SYNDROME: ICD-10-CM

## 2020-07-31 PROCEDURE — 99443 PR PHYS/QHP TELEPHONE EVALUATION 21-30 MIN: CPT | Performed by: ANESTHESIOLOGY

## 2020-07-31 RX ORDER — HYDROCODONE BITARTRATE AND ACETAMINOPHEN 7.5; 325 MG/1; MG/1
1 TABLET ORAL 4 TIMES DAILY PRN
Qty: 120 TABLET | Refills: 0 | Status: SHIPPED | OUTPATIENT
Start: 2020-08-07 | End: 2020-09-08 | Stop reason: SDUPTHER

## 2020-07-31 RX ORDER — HYDROCODONE BITARTRATE AND ACETAMINOPHEN 7.5; 325 MG/1; MG/1
1 TABLET ORAL 4 TIMES DAILY PRN
Qty: 120 TABLET | Refills: 0 | Status: SHIPPED | OUTPATIENT
Start: 2020-09-06 | End: 2020-09-04 | Stop reason: SDUPTHER

## 2020-07-31 RX ORDER — GABAPENTIN 600 MG/1
600 TABLET ORAL 4 TIMES DAILY
Qty: 120 TABLET | Refills: 5 | Status: ON HOLD | OUTPATIENT
Start: 2020-07-31 | End: 2021-04-03 | Stop reason: SDUPTHER

## 2020-07-31 NOTE — PROGRESS NOTES
CC back pain, jonah leg pain  80-year-old female with HTN, depression, chronic polyarthralgia from osteoarthritis, chronic back pain S/p L4-5 laminectomy with bony fusion 2018/[facility]elle., here for follow-up by telProsper CALDWELL last visit with ild relief.   Continued chronic back pain bilateral lower extremity radicular pain and neurogenic claudication symptoms.  Denies saddle anesthesia, bladder or bowel incontinence.  Chronic polyarthralgia / left knee pain/ bilateral feet neuropathic pain    Still  limited in ADL due to bilateral lower extremity pain and weakness.  Ambulating with walker    Utilizes gabapentin, hydrocodone with good relief functional benefit without side effects.      L-Spine x-ray 2019 Degenerative changes of the lumbar spine with stable 6 mm anterolisthesis L4 upon L5  L-spine MRI 2019: postoperative changes are noted at L4 and L5 with laminectomies and posterior bony fusion. Degenerative changes are seen at multiple levels, greatest at L3-4, with moderate to severe spinal canal narrowing and moderate bilateral foraminal narrowing, greater on the left    C-spine CT 2017:  Degenerative changes spondylosis with central stenosis at C5-C6 and left foraminal stenosis C4/5    Pain Assessment   Location of Pain: Lower Back, R Hip, L Hip, R Leg, L Leg  Description of Pain: Dull/Aching, Throbbing, Stabbing  Previous Pain Rating : 8  Current Pain Ratin  Aggravating Factors: Activity  Alleviating Factors: Rest, Medication  Previous Treatments: Nerve Blocks/Injections, Epidural Steroids, Narcotic Pain Medication, Physical Therapy  Previous Diagnostic Studies: X-Ray, MRI    PEG Assessment   What number best describes your pain on average in the past week?10  What number best describes how, during the past week, pain has interfered with your enjoyment of life?8  What number best describes how, during the past week, pain has interfered with your general activity? 9     has a past medical history of Arthritis,  Atrial fibrillation (CMS/HCC), Broken shoulder, Buttock pain, Depression, GERD (gastroesophageal reflux disease), H/O fall, Hip pain, Hypertension, Hypothyroidism, Leg pain, Low back pain, and Occipital bone fracture (CMS/HCC).     has a past surgical history that includes Hysterectomy; Rotator cuff repair (Left); Cholecystectomy; Back surgery (07/19/2018); and Colonoscopy.  .  Social History     Tobacco Use   • Smoking status: Never Smoker   • Smokeless tobacco: Never Used   Substance Use Topics   • Alcohol use: No       Review of Systems        See HPI    General       Denies fever/chills, fatigue and sleep problems.    Eyes       Denies blurry vision and double vision.    ENT       Denies decreased hearing, sore throat and ears ringing.    CV       Denies chest pain and fainting.    Resp       Denies shortness of breath and cough.    GI       Denies heartburn, constipation, nausea, vomiting and diarrhea.           Denies pain on urination, incontinence and increased frequency.    MS       Complains of joint swelling and cramps.       Denies weakness.       Right shoulder pain, low back pain, bilateral hip pain left knee.    Derm       Denies rash and itching.    Neuro       Denies numbness, tingling, loss of balance and history of seizures.    Psych       Denies anxiety and depression.    Endo       Denies weight change and thirsty all the time.    Heme       Denies easy bruising, bleeding and enlarged lymph nodes.    There were no vitals filed for this visit.  Physical Exam   NAD.    PHQ9 on chart                    opioid risk tool high risk        Assessment/Plan  Catalina was seen today for back pain, leg pain and follow-up.    Diagnoses and all orders for this visit:    Chronic pain syndrome  -     HYDROcodone-acetaminophen (NORCO) 7.5-325 MG per tablet; Take 1 tablet by mouth 4 (Four) Times a Day As Needed for Severe Pain .  -     HYDROcodone-acetaminophen (NORCO) 7.5-325 MG per tablet; Take 1 tablet by  mouth 4 (Four) Times a Day As Needed for Severe Pain . DNF before 9/6/2020    Other orders  -     gabapentin (NEURONTIN) 600 MG tablet; Take 1 tablet by mouth 4 (Four) Times a Day.    Summary:   80-year-old female with HTN, depression, chronic polyarthralgia from osteoarthritis, chronic back pain from DDD/ spondylosis/stenosis, S/p L4/5 laminectomy with bony fusion 7/2018/ Majd seen in  follow-up.     Chronic lower lumbar /coccyx pain and RLE radicular pain, continued bilateral lower extremity weakness.    Worsening back/BLE pain from post laminectomy syndrome and lumbar stenosis.   Mild relief with LESI last visit. .    Continue hydrocodone 7.5/325 4 times daily as needed for severe pain.  UDS and Inspect reviewed.   Discussed risk of tolerance, dependence, respiratory depression, coma and death associated with use of oral opioids for treatment of chronic nonmalignant pain.      Cont Lidocaine patch for worsening myofascial and neuropathic pain.   Strt requip for RLS at night    telemedicine done to avoid coronavirus exposure. Discussed CDC guidelines and precaution regarding current epidemic.  Unable to complete visit using a video connection to the patient. A phone visit was used to complete visit. Total time  23 minutes    RTC  in  2 mo.

## 2020-08-06 ENCOUNTER — APPOINTMENT (OUTPATIENT)
Dept: PAIN MEDICINE | Facility: CLINIC | Age: 80
End: 2020-08-06

## 2020-08-06 ENCOUNTER — OFFICE VISIT (OUTPATIENT)
Dept: CARDIOLOGY | Facility: CLINIC | Age: 80
End: 2020-08-06

## 2020-08-06 VITALS
SYSTOLIC BLOOD PRESSURE: 176 MMHG | DIASTOLIC BLOOD PRESSURE: 91 MMHG | WEIGHT: 180 LBS | OXYGEN SATURATION: 99 % | BODY MASS INDEX: 30.73 KG/M2 | HEART RATE: 72 BPM | HEIGHT: 64 IN

## 2020-08-06 DIAGNOSIS — I10 ESSENTIAL HYPERTENSION: Primary | ICD-10-CM

## 2020-08-06 DIAGNOSIS — I48.0 PAROXYSMAL ATRIAL FIBRILLATION (HCC): ICD-10-CM

## 2020-08-06 DIAGNOSIS — I27.20 PULMONARY HYPERTENSION (HCC): ICD-10-CM

## 2020-08-06 DIAGNOSIS — I50.33 ACUTE ON CHRONIC DIASTOLIC CONGESTIVE HEART FAILURE (HCC): ICD-10-CM

## 2020-08-06 PROCEDURE — 99214 OFFICE O/P EST MOD 30 MIN: CPT | Performed by: INTERNAL MEDICINE

## 2020-08-06 PROCEDURE — 93010 ELECTROCARDIOGRAM REPORT: CPT | Performed by: INTERNAL MEDICINE

## 2020-08-06 NOTE — PROGRESS NOTES
Subjective:     Encounter Date:08/06/2020      Patient ID: Catalina Moreno is a 80 y.o. female.    Chief Complaint:Complaining of dizziness  History of Present Illness     80-year-old white female patient with history of paroxysmal atrial fibrillation hypertension problems with fluid overload is back for follow-up      Patient had repeat echocardiogram June 2020 showed severe pulmonary hypertension severe tricuspid insufficiency mild to moderate mitral and aortic insufficiency normal LV systolic function RV dilatation noted      Stress Myoview January 2020 fixed defect without any ischemia  Patient unable to tolerate warfarin due to severe dizziness currently she is trying to take Eliquis 5 mg twice daily  I advised her to have a Holter monitor electrolytes BNP in the near future  Advised her to have tight stockings but she is not using  I advised previously patient have a pulmonary evaluation she has not done that  Will refer to see pulmonary regarding severe pulmonary hypertension  I will follow her in 4 weeks with Holter results also lab results  Patient was strongly advised to get the sleep apnea evaluation  I will start Avapro 75 mg for better control of blood pressure   EKG today appears to be sinus rhythm with short IL interval  She is having some venous insufficiency  Advised tight stockings  Continue anticoagulation           The following portions of the patient's history were reviewed and updated as appropriate: Allergies current medications past family history past medical history past social history past surgical history problem list and review of systems    The following portions of the patient's history were reviewed and updated as appropriate: Allergies current medications past family history past medical history past social history past surgical history problem list and review of systems  Past Medical History:   Diagnosis Date   • Arthritis    • Atrial fibrillation (CMS/HCC)    • Broken  "shoulder     left-- due to fall 11-7-19 was at [facility]   • Buttock pain     rt side   • Depression    • GERD (gastroesophageal reflux disease)    • H/O fall    • Hip pain     rt   • Hypertension    • Hypothyroidism    • Leg pain     rt   • Low back pain    • Occipital bone fracture (CMS/HCC)     left- due to fall   11-7-19 was inpt at [facility]     Past Surgical History:   Procedure Laterality Date   • BACK SURGERY  07/19/2018    PDC &  PSF L3-L5 insitu   • CHOLECYSTECTOMY     • COLONOSCOPY     • HYSTERECTOMY     • ROTATOR CUFF REPAIR Left      /91 (BP Location: Left arm, Patient Position: Sitting, Cuff Size: Adult)   Pulse 72   Ht 162.6 cm (64\")   Wt 81.6 kg (180 lb)   SpO2 99%   BMI 30.90 kg/m²   Family History   Problem Relation Age of Onset   • Cancer Father    • Diabetes Son    • Cancer Paternal Aunt    • Heart disease Paternal Aunt    • Cancer Paternal Uncle        Current Outpatient Medications:   •  apixaban (ELIQUIS) 5 MG tablet tablet, Take 5 mg by mouth 2 (Two) Times a Day., Disp: , Rfl:   •  furosemide (LASIX) 20 MG tablet, Take 20 mg by mouth Daily., Disp: , Rfl: 0  •  gabapentin (NEURONTIN) 600 MG tablet, Take 1 tablet by mouth 4 (Four) Times a Day., Disp: 120 tablet, Rfl: 5  •  [START ON 8/7/2020] HYDROcodone-acetaminophen (NORCO) 7.5-325 MG per tablet, Take 1 tablet by mouth 4 (Four) Times a Day As Needed for Severe Pain ., Disp: 120 tablet, Rfl: 0  •  levothyroxine (SYNTHROID, LEVOTHROID) 75 MCG tablet, Take 75 mcg by mouth Daily., Disp: , Rfl: 0  •  lidocaine (LIDODERM) 5 %, Place 2 patches on the skin as directed by provider Daily. Remove & Discard patch within 12 hours or as directed by MD, Disp: 60 patch, Rfl: 11  •  methocarbamol (ROBAXIN) 500 MG tablet, Take 500 mg by mouth 2 (Two) Times a Day., Disp: , Rfl:   •  metoprolol succinate XL (TOPROL-XL) 50 MG 24 hr tablet, Take 50 mg by mouth Daily., Disp: , Rfl:   •  pantoprazole (PROTONIX) 40 MG EC tablet, Take 40 mg by mouth Daily., Disp: , " Rfl: 0  •  sertraline (ZOLOFT) 50 MG tablet, TK 1 T PO D HS, Disp: , Rfl:   •  famotidine (PEPCID) 20 MG tablet, Take 20 mg by mouth 2 (Two) Times a Day., Disp: , Rfl: 0  •  [START ON 9/6/2020] HYDROcodone-acetaminophen (NORCO) 7.5-325 MG per tablet, Take 1 tablet by mouth 4 (Four) Times a Day As Needed for Severe Pain . DNF before 9/6/2020, Disp: 120 tablet, Rfl: 0  •  rOPINIRole (REQUIP) 0.25 MG tablet, Take 1 tablet by mouth Every Night. Take 1 hour before bedtime., Disp: 30 tablet, Rfl: 1  •  traZODone (DESYREL) 50 MG tablet, TK 1/2 T PO QD HS, Disp: , Rfl:   •  warfarin (Coumadin) 4 MG tablet, Take one by mouth every evening at the same time., Disp: 30 tablet, Rfl: 0  Social History     Socioeconomic History   • Marital status:      Spouse name: Not on file   • Number of children: Not on file   • Years of education: Not on file   • Highest education level: Not on file   Tobacco Use   • Smoking status: Never Smoker   • Smokeless tobacco: Never Used   Substance and Sexual Activity   • Alcohol use: No   • Drug use: No   • Sexual activity: Defer     Allergies   Allergen Reactions   • Baclofen Rash   • Codeine Nausea Only   • Ibuprofen Unknown (See Comments)     Patient doesn't know----Motin   • Methocarbamol Unknown (See Comments)     Patient doesn't know   • Naproxen Unknown (See Comments)     Pt. Doesn't know    • Tizanidine Hcl Hallucinations   • Tolmetin Unknown (See Comments)     Pt. Doesn't know-- same as Tolectin     Review of Systems   Constitution: Positive for malaise/fatigue. Negative for fever.   HENT: Positive for hearing loss. Negative for congestion.    Eyes: Positive for visual disturbance. Negative for double vision.   Cardiovascular: Positive for claudication, dyspnea on exertion and leg swelling. Negative for syncope.   Respiratory: Positive for shortness of breath. Negative for cough.    Endocrine: Negative for cold intolerance.   Skin: Negative for color change and rash.    Musculoskeletal: Positive for arthritis and joint pain.   Gastrointestinal: Negative for abdominal pain and heartburn.   Genitourinary: Negative for hematuria.   Neurological: Positive for dizziness. Negative for excessive daytime sleepiness.   Psychiatric/Behavioral: Positive for depression. The patient is nervous/anxious.    All other systems reviewed and are negative.             Objective:     Physical Exam   Constitutional: She is oriented to person, place, and time. She appears well-developed and well-nourished. She is cooperative.   HENT:   Head: Normocephalic and atraumatic.   Mouth/Throat: Uvula is midline and oropharynx is clear and moist. No oral lesions.   Eyes: Conjunctivae are normal. No scleral icterus.   Neck: Trachea normal. Neck supple. No JVD present. Carotid bruit is not present. No thyromegaly present.   Cardiovascular: Normal rate, regular rhythm, S1 normal, S2 normal, intact distal pulses and normal pulses. PMI is not displaced. Exam reveals no gallop and no friction rub.   Murmur heard.  Pulmonary/Chest: Effort normal and breath sounds normal.   Abdominal: Soft. Bowel sounds are normal.   Musculoskeletal: Normal range of motion. She exhibits edema.   Neurological: She is alert and oriented to person, place, and time. She has normal strength.   No focal deficits   Skin: Skin is warm. No cyanosis.   Venous stasis noted   Psychiatric: She has a normal mood and affect.       Procedures    Lab Review:       Assessment:          Diagnosis Plan   1. Essential hypertension     2. Paroxysmal atrial fibrillation (CMS/HCC)     3. Acute on chronic diastolic congestive heart failure (CMS/HCC)     4. Pulmonary hypertension (CMS/HCC)            Plan:         Uncontrolled hypertension we will start Avapro  Fluid overload most probably due to RV failure severe pulmonary hypertension needs pulmonary evaluation continue diuresis  Suggest sleep apnea evaluation  Continue anticoagulation

## 2020-08-07 ENCOUNTER — TELEPHONE (OUTPATIENT)
Dept: PAIN MEDICINE | Facility: HOSPITAL | Age: 80
End: 2020-08-07

## 2020-08-12 ENCOUNTER — TELEPHONE (OUTPATIENT)
Dept: CARDIOLOGY | Facility: CLINIC | Age: 80
End: 2020-08-12

## 2020-08-12 ENCOUNTER — TELEPHONE (OUTPATIENT)
Dept: NEUROSURGERY | Facility: CLINIC | Age: 80
End: 2020-08-12

## 2020-08-12 NOTE — TELEPHONE ENCOUNTER
PATIENT HAS UPCOMING APPT ON MON (8/17) & CALLED REQUESTING GEOGRAPHIC DETAILS ON OFFICE LOCATION. ADDRESS WAS PROVIDED BUT PATIENT WANTS TO KNOW IF THERE IS A NAME OF THE BUILDING WHICH UNABLE TO PROVIDE AT TIME OF CALL DUE TO LACK OF KNOWLEDGE OF CAMPUS. PLEASE ADVISE.    [phone]

## 2020-08-12 NOTE — TELEPHONE ENCOUNTER
Dr. Worthy  Fax [phone]  Injections 8/24/2020  Ok to hold Eliquis 5 days prior?   LOV 8/7/2020

## 2020-08-17 ENCOUNTER — OFFICE VISIT (OUTPATIENT)
Dept: NEUROSURGERY | Facility: CLINIC | Age: 80
End: 2020-08-17

## 2020-08-17 VITALS
DIASTOLIC BLOOD PRESSURE: 71 MMHG | SYSTOLIC BLOOD PRESSURE: 173 MMHG | WEIGHT: 184 LBS | HEART RATE: 74 BPM | HEIGHT: 64 IN | BODY MASS INDEX: 31.41 KG/M2

## 2020-08-17 DIAGNOSIS — M47.26 OTHER SPONDYLOSIS WITH RADICULOPATHY, LUMBAR REGION: Primary | ICD-10-CM

## 2020-08-17 DIAGNOSIS — Z78.0 POST-MENOPAUSAL: ICD-10-CM

## 2020-08-17 DIAGNOSIS — M43.16 SPONDYLOLISTHESIS OF LUMBAR REGION: ICD-10-CM

## 2020-08-17 PROCEDURE — 99202 OFFICE O/P NEW SF 15 MIN: CPT | Performed by: NEUROLOGICAL SURGERY

## 2020-08-17 NOTE — TELEPHONE ENCOUNTER
Spoke w pt and she stated taking Eliquis because warfarin made her really dizzy.  Sent in 90 day supply [facility].

## 2020-08-17 NOTE — TELEPHONE ENCOUNTER
Called pt LMOM to find out if needed refills through [facility].  Also to verify if pt is taking eliquis or warfarin.

## 2020-08-17 NOTE — PROGRESS NOTES
"Subjective   Catalina TANIA Moreno is a 80 y.o. female.     Chief Complaint   Patient presents with   • Back Pain     lumbar pain with radiculopathy to the left lower extremity     Visit Vitals  /71 (BP Location: Left arm, Patient Position: Sitting, Cuff Size: Adult)   Pulse 74   Ht 162.6 cm (64\")   Wt 83.5 kg (184 lb)   BMI 31.58 kg/m²       History of Present Illness: Josh is a 80-year-old lady who presents today with a 3-year history of progressive low back and left leg pain.  She states over the past year is progressively worsened.  She cannot stand more than a few minutes without intense back pain and pain going down the left leg.  If she sits for any period of time she has intense back pain and some leg pain.  She is tried physical therapy with no relief.  Proximally 4 years ago she underwent a lumbar decompression for radiculopathy she states this helped for about 3 to 5 months before the pain started to return.  Since that time her pain in the back is actually worsened.  She states she undergoes epidurals and they help for about 2 to 3 weeks.  She feels like her balance is off when she is having pain but when she is responding to the epidural injections her balance is better.  She uses a walker to ambulate.  She is tried physical therapy in the past with minimal relief.  She is recently been evaluated by Dr. Alcantara in cardiology with a history of paroxysmal atrial fibrillation and hypertension.  She currently takes Eliquis.  She underwent an echocardiogram June 2020 showed severe pulmonary hypertension severe tricuspid insufficiency mild to moderate mitral and aortic insufficiency normal LV systolic function RV dilatation.  She does get short of breath when she walks for any period of time and her water pills are not helping as much as they used to she says.        The following portions of the patient's history were reviewed and updated as appropriate: allergies, current medications, past family history, " past medical history, past social history, past surgical history and problem list.    Review of Systems         Past Surgical History:   Procedure Laterality Date   • BACK SURGERY  07/19/2018    PDC &  PSF L3-L5 insitu   • CHOLECYSTECTOMY     • COLONOSCOPY     • HYSTERECTOMY     • ROTATOR CUFF REPAIR Left        Past Medical History:   Diagnosis Date   • Arthritis    • Atrial fibrillation (CMS/HCC)    • Broken shoulder     left-- due to fall 11-7-19 was at [facility]   • Buttock pain     rt side   • Depression    • GERD (gastroesophageal reflux disease)    • H/O fall    • Hip pain     rt   • Hypertension    • Hypothyroidism    • Leg pain     rt   • Low back pain    • Occipital bone fracture (CMS/HCC)     left- due to fall   11-7-19 was inpt at [facility]     Social History     Socioeconomic History   • Marital status:      Spouse name: Not on file   • Number of children: Not on file   • Years of education: Not on file   • Highest education level: Not on file   Tobacco Use   • Smoking status: Never Smoker   • Smokeless tobacco: Never Used   Substance and Sexual Activity   • Alcohol use: No   • Drug use: No   • Sexual activity: Defer      Family History   Problem Relation Age of Onset   • Cancer Father    • Diabetes Son    • Cancer Paternal Aunt    • Heart disease Paternal Aunt    • Cancer Paternal Uncle           Objective   Physical Exam  Neurologic Exam  Ortho Exam  Mildly obese, using a walker to ambulate, no apparent distress   alert and oriented by 3  Speech is intact and coherent articulate with good content and production  Cranial nerves III through XII are grossly intact with pupils symmetric and reactive and no gaze paresis or nystagmus  Sensation is intact to soft touch and pinprick  in both upper and lower extremities  Proprioception is intact in both upper and lower extremities to metric  Motor strength is 5/5 in both upper and lower extremities with no focal motor deficits  Reflexes are 2+ in both upper and  lower extremities with no upper motor neuron signs  Gait is shuffled and antalgic   heel toe walking is unable to be performed   no dysmetria or dysdiadochokinesia  Noted to palpation on the axial lumbar spine with exacerbation of left leg pain and left leg extension  Negative hip and SI exam  Give straight leg lift  Upper extremities normal symmetric 2+ distal pulses there is 2+ pitting edema bilateral lower extremities with varicose veins and less than 2-second cap refill    Lumbar MRI demonstrates advanced spondylitic disease.  There is severe disc degeneration associate facet arthropathy at the L3-4 and L4-5 levels with a listhesis of L4-5 and bilateral neuroforaminal stenosis at this level.  There is a lesser degree at L5-S1.  The conus ends at approximately L1.    Lumbar standing films 2019 demonstrate a 6 mm listhesis L4 on 5 and loss of lordosis with approximately 40 degrees lumbar curvature and 62 degrees and pelvic incident.    Cervical MRI 2019 demonstrates pan spinal disease canal stenosis but no overt cord compression or abnormal spinal cord signal.        Assessment and Plan: At this time I feel Mrs. Moreno suffers from dynamic spinal listhesis with associated radiculopathy.  She has not undergone a bone DEXA scan as I can see by any of her charts.  She does have other medical amenities in including cardiomyopathy.  She would need cardiac clearance if surgical admission was warranted.  At this time we will send her for standing lumbar flexion-extension films as well as DEXA scan.  Though she still responds to epidural injections they are short-lived but I do feel that the only option to address her spine most likely will be instrumented arthrodesis.  I will see her back after the images have been obtained.      Problems Addressed this Visit     None

## 2020-08-21 ENCOUNTER — RESULTS ENCOUNTER (OUTPATIENT)
Dept: CARDIOLOGY | Facility: CLINIC | Age: 80
End: 2020-08-21

## 2020-08-21 ENCOUNTER — TRANSCRIBE ORDERS (OUTPATIENT)
Dept: NEUROSURGERY | Facility: CLINIC | Age: 80
End: 2020-08-21

## 2020-08-21 ENCOUNTER — TELEPHONE (OUTPATIENT)
Dept: CARDIOLOGY | Facility: CLINIC | Age: 80
End: 2020-08-21

## 2020-08-21 DIAGNOSIS — Z79.01 LONG TERM (CURRENT) USE OF ANTICOAGULANTS: ICD-10-CM

## 2020-08-21 DIAGNOSIS — I48.0 PAROXYSMAL ATRIAL FIBRILLATION (HCC): ICD-10-CM

## 2020-08-21 NOTE — TELEPHONE ENCOUNTER
Patient called stating she was told to hold her eliquis starting today for pain mgmt injections. Called Zaynab's office spoke with staff patient is scheduled for 9/4/20 and is to hold medication 3 days prior. Advised patient also advised patient to call pain mgmt on Monday to clairify pt voices understanding apLPN

## 2020-08-24 ENCOUNTER — APPOINTMENT (OUTPATIENT)
Dept: PAIN MEDICINE | Facility: HOSPITAL | Age: 80
End: 2020-08-24

## 2020-08-24 ENCOUNTER — TELEPHONE (OUTPATIENT)
Dept: NEUROSURGERY | Facility: CLINIC | Age: 80
End: 2020-08-24

## 2020-08-24 NOTE — TELEPHONE ENCOUNTER
PATIENT CALL TO SEE IF HER APPT CAN BE MOVED UP SOONER THAN 9/9.  I ADDED HER TO THE WAIT LIST.  SHE WOULD LIKE DR MCDOWELL TO KNOW THAT SHE IS EXPERIENCING NUMBNESS IN HER FEET AND MUSCLES SPASMS.  SHE IS AT THE POINT THAT SHE CAN BARELY WALK.  PLEASE CALL PATIENT AND ADVISE.    [phone]

## 2020-08-25 ENCOUNTER — TELEPHONE (OUTPATIENT)
Dept: PAIN MEDICINE | Facility: HOSPITAL | Age: 80
End: 2020-08-25

## 2020-08-27 DIAGNOSIS — G89.4 CHRONIC PAIN SYNDROME: Primary | ICD-10-CM

## 2020-08-27 RX ORDER — DIAZEPAM 10 MG/1
10 TABLET ORAL ONCE
Qty: 1 TABLET | Refills: 0 | Status: SHIPPED | OUTPATIENT
Start: 2020-08-27 | End: 2020-08-27

## 2020-09-03 ENCOUNTER — TELEPHONE (OUTPATIENT)
Dept: NEUROSURGERY | Facility: CLINIC | Age: 80
End: 2020-09-03

## 2020-09-03 ENCOUNTER — HOSPITAL ENCOUNTER (OUTPATIENT)
Dept: BONE DENSITY | Facility: HOSPITAL | Age: 80
Discharge: HOME | End: 2020-09-03
Admitting: NEUROLOGICAL SURGERY

## 2020-09-03 ENCOUNTER — HOSPITAL ENCOUNTER (OUTPATIENT)
Dept: GENERAL RADIOLOGY | Facility: HOSPITAL | Age: 80
Discharge: HOME | End: 2020-09-03

## 2020-09-03 DIAGNOSIS — M43.16 SPONDYLOLISTHESIS OF LUMBAR REGION: Primary | ICD-10-CM

## 2020-09-03 DIAGNOSIS — M47.26 OTHER SPONDYLOSIS WITH RADICULOPATHY, LUMBAR REGION: ICD-10-CM

## 2020-09-03 DIAGNOSIS — Z78.0 POST-MENOPAUSAL: ICD-10-CM

## 2020-09-03 DIAGNOSIS — M43.16 SPONDYLOLISTHESIS OF LUMBAR REGION: ICD-10-CM

## 2020-09-03 PROCEDURE — 77080 DXA BONE DENSITY AXIAL: CPT

## 2020-09-03 PROCEDURE — 72114 X-RAY EXAM L-S SPINE BENDING: CPT

## 2020-09-04 ENCOUNTER — TELEPHONE (OUTPATIENT)
Dept: PAIN MEDICINE | Facility: CLINIC | Age: 80
End: 2020-09-04

## 2020-09-04 ENCOUNTER — HOSPITAL ENCOUNTER (OUTPATIENT)
Dept: PAIN MEDICINE | Facility: HOSPITAL | Age: 80
Discharge: HOME | End: 2020-09-04
Admitting: ANESTHESIOLOGY

## 2020-09-04 ENCOUNTER — HOSPITAL ENCOUNTER (OUTPATIENT)
Dept: PAIN MEDICINE | Facility: HOSPITAL | Age: 80
Discharge: HOME | End: 2020-09-04

## 2020-09-04 ENCOUNTER — RESULTS ENCOUNTER (OUTPATIENT)
Dept: PAIN MEDICINE | Facility: HOSPITAL | Age: 80
End: 2020-09-04

## 2020-09-04 VITALS
SYSTOLIC BLOOD PRESSURE: 173 MMHG | RESPIRATION RATE: 16 BRPM | OXYGEN SATURATION: 97 % | WEIGHT: 180 LBS | DIASTOLIC BLOOD PRESSURE: 100 MMHG | HEART RATE: 90 BPM | BODY MASS INDEX: 35.34 KG/M2 | TEMPERATURE: 97.1 F | HEIGHT: 60 IN

## 2020-09-04 DIAGNOSIS — M54.16 LUMBAR RADICULITIS: Primary | ICD-10-CM

## 2020-09-04 DIAGNOSIS — F19.90 CURRENT DRUG USE: ICD-10-CM

## 2020-09-04 DIAGNOSIS — G89.4 CHRONIC PAIN SYNDROME: ICD-10-CM

## 2020-09-04 DIAGNOSIS — R52 PAIN: ICD-10-CM

## 2020-09-04 DIAGNOSIS — F19.90 CURRENT DRUG USE: Primary | ICD-10-CM

## 2020-09-04 PROCEDURE — 77003 FLUOROGUIDE FOR SPINE INJECT: CPT

## 2020-09-04 PROCEDURE — 25010000002 METHYLPREDNISOLONE PER 40 MG

## 2020-09-04 PROCEDURE — 62323 NJX INTERLAMINAR LMBR/SAC: CPT | Performed by: ANESTHESIOLOGY

## 2020-09-04 PROCEDURE — 0 IOPAMIDOL 41 % SOLUTION: Performed by: ANESTHESIOLOGY

## 2020-09-04 RX ORDER — METHYLPREDNISOLONE ACETATE 40 MG/ML
40 INJECTION, SUSPENSION INTRA-ARTICULAR; INTRALESIONAL; INTRAMUSCULAR; SOFT TISSUE ONCE
Status: COMPLETED | OUTPATIENT
Start: 2020-09-04 | End: 2020-09-04

## 2020-09-04 RX ORDER — HYDROCODONE BITARTRATE AND ACETAMINOPHEN 7.5; 325 MG/1; MG/1
1 TABLET ORAL 4 TIMES DAILY PRN
Qty: 120 TABLET | Refills: 0 | Status: SHIPPED | OUTPATIENT
Start: 2020-09-05 | End: 2020-10-01 | Stop reason: SDUPTHER

## 2020-09-04 RX ORDER — METHYLPREDNISOLONE ACETATE 40 MG/ML
INJECTION, SUSPENSION INTRA-ARTICULAR; INTRALESIONAL; INTRAMUSCULAR; SOFT TISSUE
Status: DISCONTINUED
Start: 2020-09-04 | End: 2020-09-05 | Stop reason: HOSPADM

## 2020-09-04 RX ORDER — CYCLOBENZAPRINE HCL 10 MG
10 TABLET ORAL 3 TIMES DAILY PRN
Qty: 60 TABLET | Refills: 0 | Status: SHIPPED | OUTPATIENT
Start: 2020-09-04 | End: 2020-09-16 | Stop reason: SDUPTHER

## 2020-09-04 RX ADMIN — METHYLPREDNISOLONE ACETATE 40 MG: 40 INJECTION, SUSPENSION INTRA-ARTICULAR; INTRALESIONAL; INTRAMUSCULAR; SOFT TISSUE at 11:53

## 2020-09-04 RX ADMIN — IOPAMIDOL 3 ML: 408 INJECTION, SOLUTION INTRATHECAL at 11:53

## 2020-09-04 NOTE — PROCEDURES
"Subjective    Cc back pain/BLE pain  Catalina Moreno is a 80 y.o. female with lumbar radiculitis postlaminectomy syndrome here for repeat LESI.  Off Eliquis 3 days.     Pain Assessment   Location of Pain: Lower Back, R Hip, L Hip, L Leg, neck pain, joint  Description of Pain: Dull/Aching, Throbbing, Stabbing  Previous Pain Rating :8  Current Pain Rating: 10  Aggravating Factors: Activity  Alleviating Factors: Rest, Medication    The following portions of the patient's history were reviewed and updated as appropriate: allergies, current medications, past family history, past medical history, past social history, past surgical history and problem list.    Review of Systems  AS in HPI  Objective   Physical Exam   Constitutional: She is oriented to person, place, and time. She appears well-developed. No distress.   Cardiovascular: Normal rate.   Pulmonary/Chest: Effort normal.   Musculoskeletal:        Lumbar back: She exhibits decreased range of motion and tenderness.   Neurological: She is alert and oriented to person, place, and time.   Psychiatric: She has a normal mood and affect.     /100 (Patient Position: Sitting)   Pulse 90   Temp 97.1 °F (36.2 °C) (Oral)   Resp 16   Ht 152.4 cm (60\")   Wt 81.6 kg (180 lb)   SpO2 97%   BMI 35.15 kg/m²     Assessment/Plan     Underwent repeat LESI.  RTC 6 weeks      DATE OF PROCEDURE:  9/4//2020    PREOPERATIVE DIAGNOSIS: lumbar DDD/radiculitis    POSTOPERATIVE DIAGNOSIS: same    PROCEDURE PERFORMED:  lumbar Epidural Steroid Injection    The patient presents with a history of   lumbar degenerative disc disease with  radiculitis. The patient presents today for a  lumbar epidural steroid injection at level  L5/S1.   The patient was seen in the preoperative area.  Patient's consent was obtained and updated.  Vitals were taken.  Patient was then brought to the procedure suite and placed in a prone position. The appropriate anatomic area was widely prepped with " Chloraprep and draped in a sterile fashion. Noninvasive monitoring per routine anesthesia protocol was placed.  Under fluoroscopic guidance using  AP view a 20 gauge styleted tuohy needle was passed through skin anesthetized with 1% Lidocaine without epinephrine.  The needle was advanced using the continuous loss of resistance to saline technique into the L5 epidural space. Needle tip placement in the epidural space was confirmed by loss of resistance and injection of 1 mL of  preservative free contrast.  Following this 8 mL of a solution containing  1 mL of 40 mg Depo-Medrol and 7 mL of preservative-free saline was carefully administered in the epidural space.  A sterile dressing was placed over the puncture site.    The patient tolerated the procedure with no complications . They were then brought to the post procedure area where they recovered nicely.

## 2020-09-08 ENCOUNTER — PREP FOR SURGERY (OUTPATIENT)
Dept: OTHER | Facility: HOSPITAL | Age: 80
End: 2020-09-08

## 2020-09-08 ENCOUNTER — OFFICE VISIT (OUTPATIENT)
Dept: NEUROSURGERY | Facility: CLINIC | Age: 80
End: 2020-09-08

## 2020-09-08 DIAGNOSIS — Z01.818 PRE-OP TESTING: ICD-10-CM

## 2020-09-08 DIAGNOSIS — Z51.81 ENCOUNTER FOR THERAPEUTIC DRUG LEVEL MONITORING: ICD-10-CM

## 2020-09-08 DIAGNOSIS — M54.16 LUMBAR RADICULOPATHY: Primary | ICD-10-CM

## 2020-09-08 DIAGNOSIS — G89.4 CHRONIC PAIN SYNDROME: ICD-10-CM

## 2020-09-08 DIAGNOSIS — M43.17 ACQUIRED SPONDYLOLISTHESIS OF LUMBOSACRAL REGION: ICD-10-CM

## 2020-09-08 DIAGNOSIS — M43.16 SPONDYLOLISTHESIS, LUMBAR REGION: Primary | ICD-10-CM

## 2020-09-08 PROCEDURE — 99442 PR PHYS/QHP TELEPHONE EVALUATION 11-20 MIN: CPT | Performed by: NEUROLOGICAL SURGERY

## 2020-09-08 RX ORDER — SODIUM CHLORIDE 0.9 % (FLUSH) 0.9 %
3-10 SYRINGE (ML) INJECTION AS NEEDED
Status: CANCELLED | OUTPATIENT
Start: 2020-09-08

## 2020-09-08 RX ORDER — HYDROCODONE BITARTRATE AND ACETAMINOPHEN 7.5; 325 MG/1; MG/1
1 TABLET ORAL 4 TIMES DAILY PRN
Qty: 120 TABLET | Refills: 0 | Status: SHIPPED | OUTPATIENT
Start: 2020-09-08 | End: 2020-10-01 | Stop reason: SDUPTHER

## 2020-09-08 RX ORDER — CEFAZOLIN SODIUM IN 0.9 % NACL 3 G/100 ML
3 INTRAVENOUS SOLUTION, PIGGYBACK (ML) INTRAVENOUS ONCE
Status: CANCELLED | OUTPATIENT
Start: 2020-09-08 | End: 2020-09-08

## 2020-09-08 RX ORDER — SODIUM CHLORIDE 0.9 % (FLUSH) 0.9 %
3 SYRINGE (ML) INJECTION EVERY 12 HOURS SCHEDULED
Status: CANCELLED | OUTPATIENT
Start: 2020-09-08

## 2020-09-08 NOTE — PROGRESS NOTES
Subjective   Catalina Moreno is a 80 y.o. female.     Chief Complaint   Patient presents with   • Follow-up     f/u low back pain      There were no vitals taken for this visit.    History of Present Illness: Mrs. Moreno is being followed today via tele-visit.  She states that her back and leg pain are becoming unbearable.  She states the epidurals will work for a couple of weeks but within a week the pain is starting to return.  She states that she cannot move more than a few feet without excruciating pain and radiating pain.  The back is equal to the leg pain but usually a little worse.  She states if she lays in a recliner it does ease up.  On review of her lumbar flexion-extension films she has multiple levels of dynamic listhesis.  At the L2-3 she moves approximately 3 mm in flexion extension.  At the L3-4 she moves approximately 8 mm in flexion extension and she moves approximately 9 mm at the L4-5 level.  At L5-S1 when she appears to be autofused.  At this time I feel she does suffer from progressive degenerative radiculopathy with associated spinal listhesis and axial back pain.  Her bone mineral density is quite good with a T score of 1.4 the lumbar spine.  She was last seen by cardiology on 8/6/2020.  She suffers from proximal atrial fibrillation and hypertension.  She is currently on Eliquis.  The repeat echocardiogram showed pulmonary hypertension and tricuspid insufficiency.  She had normal left ventricular and right ventricular function.  At this time I do not feel there is any further conservative measures we can offer.  I proposed with her medical conditions and anterior lumbar decompression arthrodesis via an L2-5 a left with instrumented arthrodesis anteriorly.  I did discuss with her that she might need posterior stabilization in addition to this but I would propose this in a staged fashion.  I proposed an anterior approach as it would allow indirect decompression of the neural elements and  stabilization of the spine with potential less blood loss and easier anesthesia as she would be in the supine position with a significant shorter operative time and then a posterior procedure.  She will need cardiac clearance.  We did discuss the risk the day.  These risk include bleeding, death, paralysis, infection, worsening pain, CSF leak, injury to the great vessels, bowel, ureters, bladder.  The other risk potentially include failure the procedure with need for further procedures included posterior instrumented arthrodesis and adjacent segment arthrodesis.  Shee indicates he understands and wishes to proceed.    The following portions of the patient's history were reviewed and updated as appropriate: allergies, current medications, past family history, past medical history, past social history, past surgical history and problem list.    Review of Systems         Past Surgical History:   Procedure Laterality Date   • BACK SURGERY  07/19/2018    PDC &  PSF L3-L5 insitu   • CHOLECYSTECTOMY     • COLONOSCOPY     • HYSTERECTOMY     • ROTATOR CUFF REPAIR Left        Past Medical History:   Diagnosis Date   • Arthritis    • Atrial fibrillation (CMS/HCC)    • Broken shoulder     left-- due to fall 11-7-19 was at [facility]   • Buttock pain     rt side   • Depression    • GERD (gastroesophageal reflux disease)    • H/O fall    • Hip pain     rt   • Hypertension    • Hypothyroidism    • Leg pain     rt   • Low back pain    • Occipital bone fracture (CMS/HCC)     left- due to fall   11-7-19 was inpt at [facility]     Social History     Socioeconomic History   • Marital status:      Spouse name: Not on file   • Number of children: Not on file   • Years of education: Not on file   • Highest education level: Not on file   Tobacco Use   • Smoking status: Never Smoker   • Smokeless tobacco: Never Used   Substance and Sexual Activity   • Alcohol use: No   • Drug use: No   • Sexual activity: Defer      Family History   Problem  Relation Age of Onset   • Cancer Father    • Diabetes Son    • Cancer Paternal Aunt    • Heart disease Paternal Aunt    • Cancer Paternal Uncle           Objective   Physical Exam  Neurologic Exam  Ortho Exam        Assessment and Plan: see above    I spent 15 minutes with the patient in direct communication obtaining history, reviewing the films, discussing the pathology and treatment plans.    You have chosen to receive care through a telephone visit. Do you consent to use a telephone visit for your medical care today? Yes      Problems Addressed this Visit     None

## 2020-09-16 ENCOUNTER — TELEPHONE (OUTPATIENT)
Dept: NEUROSURGERY | Facility: CLINIC | Age: 80
End: 2020-09-16

## 2020-09-16 DIAGNOSIS — M43.16 SPONDYLOLISTHESIS OF LUMBAR REGION: ICD-10-CM

## 2020-09-16 RX ORDER — CYCLOBENZAPRINE HCL 10 MG
10 TABLET ORAL 3 TIMES DAILY PRN
Qty: 60 TABLET | Refills: 0 | Status: SHIPPED | OUTPATIENT
Start: 2020-09-16 | End: 2021-03-31

## 2020-09-21 ENCOUNTER — TELEPHONE (OUTPATIENT)
Dept: NEUROSURGERY | Facility: CLINIC | Age: 80
End: 2020-09-21

## 2020-09-21 NOTE — TELEPHONE ENCOUNTER
PT STATED THAT WE CALLED 09.18.20 AND REQUESTED THAT SHE CALL HER CARDIOLOGIST (DR. RATLIFF [phone]) TO GET SURGERY CLEARANCE. SHE IS STATING THAT HE IS THERE TODAY BUT WILL BE OUT Tuesday AND Wednesday. THE DR SULLIVAN NEEDS US TO CALL FOR THE RELEASE NOT THE PT.    PT NUMBER [phone]    PLEASE ADVISE    THANK YOU

## 2020-09-22 NOTE — TELEPHONE ENCOUNTER
Mariam, can you please send over a updated clearance letter. The one we received is for jd Salinas. Please update and fax to [phone] att:Keri.  Thank you!

## 2020-09-23 ENCOUNTER — TELEPHONE (OUTPATIENT)
Dept: CARDIOLOGY | Facility: CLINIC | Age: 80
End: 2020-09-23

## 2020-09-23 NOTE — TELEPHONE ENCOUNTER
Dr. Aram Coppola  Phone [phone]  Fax [phone]  L2-L5 ALIF   Ok to hold blood thiners/ASA 2/3 days prior?  LOV 8/6/2020

## 2020-09-25 NOTE — TELEPHONE ENCOUNTER
would like for this patient to be scheduled for a follow-up visit in office with an EKG. Patient would like to be seen in the [place] office, so they will get her scheduled.

## 2020-10-01 ENCOUNTER — OFFICE VISIT (OUTPATIENT)
Dept: CARDIOLOGY | Facility: CLINIC | Age: 80
End: 2020-10-01

## 2020-10-01 ENCOUNTER — OFFICE VISIT (OUTPATIENT)
Dept: PAIN MEDICINE | Facility: CLINIC | Age: 80
End: 2020-10-01

## 2020-10-01 VITALS
HEIGHT: 64 IN | HEART RATE: 86 BPM | OXYGEN SATURATION: 95 % | WEIGHT: 180 LBS | BODY MASS INDEX: 30.73 KG/M2 | DIASTOLIC BLOOD PRESSURE: 75 MMHG | SYSTOLIC BLOOD PRESSURE: 154 MMHG

## 2020-10-01 VITALS — WEIGHT: 180 LBS | BODY MASS INDEX: 30.73 KG/M2 | HEIGHT: 64 IN | RESPIRATION RATE: 16 BRPM

## 2020-10-01 DIAGNOSIS — Z01.818 PRE-OP TESTING: ICD-10-CM

## 2020-10-01 DIAGNOSIS — Z01.810 PREOP CARDIOVASCULAR EXAM: ICD-10-CM

## 2020-10-01 DIAGNOSIS — M54.16 LUMBAR RADICULITIS: ICD-10-CM

## 2020-10-01 DIAGNOSIS — Z79.899 HIGH RISK MEDICATION USE: ICD-10-CM

## 2020-10-01 DIAGNOSIS — G89.4 CHRONIC PAIN SYNDROME: Primary | ICD-10-CM

## 2020-10-01 DIAGNOSIS — I10 ESSENTIAL HYPERTENSION: ICD-10-CM

## 2020-10-01 DIAGNOSIS — M48.062 LUMBAR STENOSIS WITH NEUROGENIC CLAUDICATION: ICD-10-CM

## 2020-10-01 DIAGNOSIS — I48.0 PAROXYSMAL ATRIAL FIBRILLATION (HCC): ICD-10-CM

## 2020-10-01 DIAGNOSIS — I27.20 PULMONARY HYPERTENSION (HCC): Primary | ICD-10-CM

## 2020-10-01 DIAGNOSIS — M96.1 POSTLAMINECTOMY SYNDROME OF LUMBAR REGION: ICD-10-CM

## 2020-10-01 PROCEDURE — 99443 PR PHYS/QHP TELEPHONE EVALUATION 21-30 MIN: CPT | Performed by: ANESTHESIOLOGY

## 2020-10-01 PROCEDURE — 93010 ELECTROCARDIOGRAM REPORT: CPT | Performed by: INTERNAL MEDICINE

## 2020-10-01 PROCEDURE — 99213 OFFICE O/P EST LOW 20 MIN: CPT | Performed by: INTERNAL MEDICINE

## 2020-10-01 RX ORDER — HYDROCODONE BITARTRATE AND ACETAMINOPHEN 7.5; 325 MG/1; MG/1
1 TABLET ORAL 4 TIMES DAILY PRN
Qty: 120 TABLET | Refills: 0 | Status: SHIPPED | OUTPATIENT
Start: 2020-10-07 | End: 2020-12-01 | Stop reason: SDUPTHER

## 2020-10-01 RX ORDER — HYDROCODONE BITARTRATE AND ACETAMINOPHEN 7.5; 325 MG/1; MG/1
1 TABLET ORAL 4 TIMES DAILY PRN
Qty: 120 TABLET | Refills: 0 | Status: SHIPPED | OUTPATIENT
Start: 2020-11-06 | End: 2020-12-01 | Stop reason: SDUPTHER

## 2020-10-01 NOTE — PROGRESS NOTES
CC back pain, jonah leg pain  80-year-old female with HTN, depression, chronic polyarthralgia from osteoarthritis, chronic back pain S/p L4-5 laminectomy with bony fusion 2018/Noelle., here for follow-up by tel[facility] CALDWELL with mild relief but didn't last long. Seen neurosurgery/Shanks, planning ALIF pending cardiac clearance.   Worsening chronic back pain bilateral lower extremity radicular pain and neurogenic claudication symptoms.  Denies saddle anesthesia, bladder or bowel incontinence.  Chronic polyarthralgia / left knee pain/ bilateral feet neuropathic pain    Still  limited in ADL due to bilateral lower extremity pain and weakness.  Ambulating with walker    Utilizes gabapentin, hydrocodone with good relief functional benefit without side effects.      L-Spine x-ray 2019 Degenerative changes of the lumbar spine with stable 6 mm anterolisthesis L4 upon L5  L-spine MRI 2019: postoperative changes are noted at L4 and L5 with laminectomies and posterior bony fusion. Degenerative changes are seen at multiple levels, greatest at L3-4, with moderate to severe spinal canal narrowing and moderate bilateral foraminal narrowing, greater on the left    C-spine CT 2017:  Degenerative changes spondylosis with central stenosis at C5-C6 and left foraminal stenosis C4/5    Pain Assessment   Location of Pain: Lower Back, R Hip, L Hip, R Leg, L Leg  Description of Pain: Dull/Aching, Throbbing, Stabbing  Previous Pain Rating : 8  Current Pain Ratin  Aggravating Factors: Activity  Alleviating Factors: Rest, Medication  Previous Treatments: Nerve Blocks/Injections, Epidural Steroids, Narcotic Pain Medication, Physical Therapy  Previous Diagnostic Studies: X-Ray, MRI    PEG Assessment   What number best describes your pain on average in the past week?10  What number best describes how, during the past week, pain has interfered with your enjoyment of life?8  What number best describes how, during the past week, pain has interfered  "with your general activity? 9     has a past medical history of Arthritis, Atrial fibrillation (CMS/HCC), Broken shoulder, Buttock pain, Depression, Edema, GERD (gastroesophageal reflux disease), H/O fall, Hip pain, Hypertension, Hypothyroidism, Leg pain, Low back pain, and Occipital bone fracture (CMS/HCC).     has a past surgical history that includes Hysterectomy; Rotator cuff repair (Left); Cholecystectomy; Back surgery (07/19/2018); and Colonoscopy.  .  Social History     Tobacco Use   • Smoking status: Never Smoker   • Smokeless tobacco: Never Used   Substance Use Topics   • Alcohol use: No       Review of Systems        See HPI    General       Denies fever/chills, fatigue and sleep problems.    Eyes       Denies blurry vision and double vision.    ENT       Denies decreased hearing, sore throat and ears ringing.    CV       Denies chest pain and fainting.    Resp       Denies shortness of breath and cough.    GI       Denies heartburn, constipation, nausea, vomiting and diarrhea.           Denies pain on urination, incontinence and increased frequency.    MS       Complains of joint swelling and cramps.       Denies weakness.       Right shoulder pain, low back pain, bilateral hip pain left knee.    Derm       Denies rash and itching.    Neuro       Denies numbness, tingling, loss of balance and history of seizures.    Psych       Denies anxiety and depression.    Endo       Denies weight change and thirsty all the time.    Heme       Denies easy bruising, bleeding and enlarged lymph nodes.    Vitals:    10/01/20 1021   Resp: 16   Weight: 81.6 kg (180 lb)   Height: 162.6 cm (64\")     Physical Exam   NAD.    PHQ9 on chart                    opioid risk tool high risk        Assessment/Plan  Catalina was seen today for back pain, leg pain and follow-up.    Diagnoses and all orders for this visit:    Chronic pain syndrome  -     HYDROcodone-acetaminophen (NORCO) 7.5-325 MG per tablet; Take 1 tablet by mouth 4 " (Four) Times a Day As Needed for Severe Pain .  -     HYDROcodone-acetaminophen (NORCO) 7.5-325 MG per tablet; Take 1 tablet by mouth 4 (Four) Times a Day As Needed for Severe Pain . DNF before 11/6/2020    Postlaminectomy syndrome of lumbar region    Lumbar radiculitis    Lumbar stenosis with neurogenic claudication    High risk medication use    Summary:   80-year-old female with HTN, depression, chronic polyarthralgia from osteoarthritis, chronic back pain from DDD/ spondylosis/stenosis, S/p L4/5 laminectomy with bony fusion 7/2018/ Majd seen in  follow-up.     Chronic lower lumbar /coccyx pain and RLE radicular pain, continued bilateral lower extremity weakness.    Continued Worsening back/BLE pain from post laminectomy syndrome and lumbar stenosis.   Mild relief with LESI last visit but still significant symptoms.  Seen neurosurgery/Shanks, planning ALIF pending cardiac clearance.     Continue hydrocodone 7.5/325 4 times daily as needed for severe pain.  UDS and Inspect reviewed.   Discussed risk of tolerance, dependence, respiratory depression, coma and death associated with use of oral opioids for treatment of chronic nonmalignant pain.      Cont Lidocaine patch for worsening myofascial and neuropathic pain.   Cont requip for RLS at night    telemedicine done to avoid coronavirus exposure.    A phone visit was used to complete visit. Total time  23 minutes    RTC  in  2 mo.

## 2020-10-01 NOTE — PROGRESS NOTES
Subjective:     Encounter Date:10/01/2020      Patient ID: Catalina Moreno is a 80 y.o. female.    Chief Complaint: Pre-Op Surgery Clearance  History of Present Illness     80-year-old white female patient with history of paroxysmal atrial fibrillation hypertension problems with fluid overload is back for follow-up  Patient is having significant back problems now scheduled for surgery  EKG sinus rhythm no significant ST-T abnormalities        Patient had repeat echocardiogram June 2020 showed severe pulmonary hypertension severe tricuspid insufficiency mild to moderate mitral and aortic insufficiency normal LV systolic function RV dilatation noted        Stress Myoview January 2020 fixed defect without any ischemia  Patient unable to tolerate warfarin due to severe dizziness currently she is trying to take Eliquis 5 mg twice daily    Advised her to have tight stockings but she is not using  Patient was seen by pulmonary getting evaluation for sleep apnea  Patient was advised to use stockings to help with venous insufficiency continue anticoagulation  I do not see any absolute contraindication for planned surgery but discussed with the patient about obvious surgical risks    The following portions of the patient's history were reviewed and updated as appropriate: Allergies current medications past family history past medical history past social history past surgical history problem list and review of systems  Past Medical History:   Diagnosis Date   • Arthritis    • Atrial fibrillation (CMS/HCC)    • Broken shoulder     left-- due to fall 11-7-19 was at [facility]   • Buttock pain     rt side   • Depression    • Edema     9/2020   • GERD (gastroesophageal reflux disease)    • H/O fall    • Hip pain     rt   • Hypertension    • Hypothyroidism    • Leg pain     rt   • Low back pain    • Occipital bone fracture (CMS/HCC)     left- due to fall   11-7-19 was inpt at [facility]     Past Surgical History:   Procedure Laterality  "Date   • BACK SURGERY  07/19/2018    PDC &  PSF L3-L5 insitu   • CHOLECYSTECTOMY     • COLONOSCOPY     • HYSTERECTOMY     • ROTATOR CUFF REPAIR Left      /75 (BP Location: Left arm, Patient Position: Sitting, Cuff Size: Adult)   Pulse 86   Ht 162.6 cm (64\")   Wt 81.6 kg (180 lb)   SpO2 95%   Breastfeeding No   BMI 30.90 kg/m²   Family History   Problem Relation Age of Onset   • Cancer Father    • Diabetes Son    • Cancer Paternal Aunt    • Heart disease Paternal Aunt    • Cancer Paternal Uncle        Current Outpatient Medications:   •  apixaban (ELIQUIS) 5 MG tablet tablet, Take 1 tablet by mouth Every 12 (Twelve) Hours., Disp: 180 tablet, Rfl: 3  •  cyclobenzaprine (FLEXERIL) 10 MG tablet, Take 1 tablet by mouth 3 (Three) Times a Day As Needed for Muscle Spasms., Disp: 60 tablet, Rfl: 0  •  famotidine (PEPCID) 20 MG tablet, Take 20 mg by mouth 2 (Two) Times a Day., Disp: , Rfl: 0  •  furosemide (LASIX) 20 MG tablet, Take 40 mg by mouth Daily., Disp: , Rfl: 0  •  gabapentin (NEURONTIN) 600 MG tablet, Take 1 tablet by mouth 4 (Four) Times a Day., Disp: 120 tablet, Rfl: 5  •  [START ON 10/7/2020] HYDROcodone-acetaminophen (NORCO) 7.5-325 MG per tablet, Take 1 tablet by mouth 4 (Four) Times a Day As Needed for Severe Pain ., Disp: 120 tablet, Rfl: 0  •  [START ON 11/6/2020] HYDROcodone-acetaminophen (NORCO) 7.5-325 MG per tablet, Take 1 tablet by mouth 4 (Four) Times a Day As Needed for Severe Pain . DNF before 11/6/2020, Disp: 120 tablet, Rfl: 0  •  levothyroxine (SYNTHROID, LEVOTHROID) 75 MCG tablet, Take 75 mcg by mouth Daily., Disp: , Rfl: 0  •  lidocaine (LIDODERM) 5 %, Place 2 patches on the skin as directed by provider Daily. Remove & Discard patch within 12 hours or as directed by MD, Disp: 60 patch, Rfl: 11  •  methocarbamol (ROBAXIN) 500 MG tablet, Take 500 mg by mouth 2 (Two) Times a Day., Disp: , Rfl:   •  metoprolol succinate XL (TOPROL-XL) 50 MG 24 hr tablet, Take 50 mg by mouth Daily., Disp: " , Rfl:   •  pantoprazole (PROTONIX) 40 MG EC tablet, Take 40 mg by mouth Daily., Disp: , Rfl: 0  •  rOPINIRole (REQUIP) 0.25 MG tablet, Take 1 tablet by mouth Every Night. Take 1 hour before bedtime., Disp: 30 tablet, Rfl: 1  •  sertraline (ZOLOFT) 50 MG tablet, TK 1 T PO D HS, Disp: , Rfl:   •  traZODone (DESYREL) 50 MG tablet, TK 1/2 T PO QD HS, Disp: , Rfl:   Social History     Socioeconomic History   • Marital status:      Spouse name: Not on file   • Number of children: Not on file   • Years of education: Not on file   • Highest education level: Not on file   Tobacco Use   • Smoking status: Never Smoker   • Smokeless tobacco: Never Used   Substance and Sexual Activity   • Alcohol use: No   • Drug use: No   • Sexual activity: Defer     Allergies   Allergen Reactions   • Baclofen Rash   • Codeine Nausea Only   • Ibuprofen Unknown (See Comments)     Patient doesn't know----Motin   • Methocarbamol Unknown (See Comments)     Patient doesn't know   • Naproxen Unknown (See Comments)     Pt. Doesn't know    • Tizanidine Hcl Hallucinations   • Tolmetin Unknown (See Comments)     Pt. Doesn't know-- same as Tolectin     Review of Systems   Constitution: Negative for fever and malaise/fatigue.   HENT: Negative for congestion and hearing loss.    Eyes: Negative for double vision and visual disturbance.   Cardiovascular: Positive for leg swelling. Negative for chest pain, claudication, dyspnea on exertion and syncope.   Respiratory: Positive for shortness of breath. Negative for cough.    Endocrine: Negative for cold intolerance.   Skin: Negative for color change and rash.   Musculoskeletal: Negative for arthritis and joint pain.   Gastrointestinal: Negative for abdominal pain and heartburn.   Genitourinary: Negative for hematuria.   Neurological: Negative for excessive daytime sleepiness and dizziness.   Psychiatric/Behavioral: Negative for depression. The patient is not nervous/anxious.    All other systems reviewed  and are negative.             Objective:     Constitutional:       Appearance: Well-developed.   Eyes:      General: No scleral icterus.     Conjunctiva/sclera: Conjunctivae normal.   HENT:      Head: Normocephalic and atraumatic.    Mouth/Throat:      Mouth: No oral lesions.      Pharynx: Uvula midline.   Neck:      Musculoskeletal: Neck supple.      Thyroid: No thyromegaly.      Vascular: No carotid bruit or JVD.      Trachea: Trachea normal.   Pulmonary:      Effort: Pulmonary effort is normal.      Breath sounds: Normal breath sounds.   Cardiovascular:      Normal rate. Regular rhythm.      No gallop.   Pulses:     Intact distal pulses.   Edema:     Feet: 1+ edema of the right foot.  Abdominal:      General: Bowel sounds are normal.      Palpations: Abdomen is soft.   Musculoskeletal: Normal range of motion.   Skin:     General: Skin is warm. There is no cyanosis.      Comments: Venous prominence noted bilateral lower extremity   Neurological:      Mental Status: Alert and oriented to person, place, and time.      Comments: No focal deficits   Psychiatric:         Behavior: Behavior is cooperative.         Procedures    Lab Review:       Assessment:          Diagnosis Plan   1. Pulmonary hypertension (CMS/HCC)     2. Pre-op testing     3. Paroxysmal atrial fibrillation (CMS/HCC)     4. Essential hypertension     5. Preop cardiovascular exam            Plan:         Patient needs aggressive control of sleep apnea advised her to follow-up with pulmonary  Paroxysmal atrial fibrillation on anticoagulation  Needs aggressive control of cardiac risk factors no contraindication for planned surgery from the cardiac standpoint

## 2020-10-05 ENCOUNTER — TELEPHONE (OUTPATIENT)
Dept: NEUROSURGERY | Facility: CLINIC | Age: 80
End: 2020-10-05

## 2020-10-05 NOTE — TELEPHONE ENCOUNTER
PATIENT CALLED TO CHECK THE STATUS OF AN APPT. NO APPT CURRENTLY SCHEDULED IN CHART AT TIME OF CALL. PATIENT STATES XR WAS COMPLETED PREV. AT [facility].  NO REPORT SCANNED IN CHART AT THIS TIME. CONTACTED FACILITY [phone] TO OBTAIN XR REPORT & SPOKE TO ЮЛИЯ IN MED REC. STAFF HAS NO REC OF PATIENT COMPLETING XR OF CHEST RECENTLY. ORDER CAN BE FAXED [phone] JUST IN CASE.     CONTACTED PATIENT WITH UPDATE. PATIENT LATER CLARIFIED XR WAS COMPLETED AT DR. MADSEN'S OFFICE, BUT CONTACTED DR. MADSEN'S OFFICE AFTER CALL & SPOKE TO REILLY. STAFF DENIED XR COMPLETED IN THEIR OFFICE DUE TO OFFICE NOT PROVIDING THAT SERVICE. PATIENT APPEARS TO BE CONFUSED ON XR INSTRUCTIONS & FOLLOW UP..     PATIENT CAN BE CONTACTED [phone] WITH FURTHER CLARIFICATION

## 2020-10-06 PROBLEM — M43.16 SPONDYLOLISTHESIS, LUMBAR REGION: Status: ACTIVE | Noted: 2020-10-06

## 2020-10-06 RX ORDER — ROPINIROLE 0.25 MG/1
TABLET, FILM COATED ORAL
Qty: 30 TABLET | Refills: 5 | Status: SHIPPED | OUTPATIENT
Start: 2020-10-06 | End: 2021-04-26

## 2020-10-07 ENCOUNTER — HOSPITAL ENCOUNTER (OUTPATIENT)
Dept: GENERAL RADIOLOGY | Facility: HOSPITAL | Age: 80
Discharge: HOME | End: 2020-10-07

## 2020-10-07 ENCOUNTER — LAB (OUTPATIENT)
Dept: LAB | Facility: HOSPITAL | Age: 80
End: 2020-10-07

## 2020-10-07 ENCOUNTER — HOSPITAL ENCOUNTER (OUTPATIENT)
Dept: CARDIOLOGY | Facility: HOSPITAL | Age: 80
Discharge: HOME | End: 2020-10-07

## 2020-10-07 DIAGNOSIS — Z51.81 ENCOUNTER FOR THERAPEUTIC DRUG LEVEL MONITORING: ICD-10-CM

## 2020-10-07 DIAGNOSIS — M43.16 SPONDYLOLISTHESIS, LUMBAR REGION: ICD-10-CM

## 2020-10-07 DIAGNOSIS — Z01.818 PRE-OP TESTING: ICD-10-CM

## 2020-10-07 LAB
ABO GROUP BLD: NORMAL
ALBUMIN SERPL-MCNC: 4.6 G/DL (ref 3.5–5.2)
ALBUMIN/GLOB SERPL: 1.2 G/DL
ALP SERPL-CCNC: 115 U/L (ref 39–117)
ALT SERPL W P-5'-P-CCNC: 11 U/L (ref 1–33)
ANION GAP SERPL CALCULATED.3IONS-SCNC: 15.9 MMOL/L (ref 5–15)
APTT PPP: 33.7 SECONDS (ref 24–31)
AST SERPL-CCNC: 20 U/L (ref 1–32)
BACTERIA UR QL AUTO: NORMAL /HPF
BASOPHILS # BLD AUTO: 0.06 10*3/MM3 (ref 0–0.2)
BASOPHILS NFR BLD AUTO: 0.8 % (ref 0–1.5)
BILIRUB SERPL-MCNC: 0.5 MG/DL (ref 0–1.2)
BILIRUB UR QL STRIP: NEGATIVE
BLD GP AB SCN SERPL QL: NEGATIVE
BUN SERPL-MCNC: 24 MG/DL (ref 8–23)
BUN/CREAT SERPL: 25.8 (ref 7–25)
CALCIUM SPEC-SCNC: 10.2 MG/DL (ref 8.6–10.5)
CHLORIDE SERPL-SCNC: 97 MMOL/L (ref 98–107)
CLARITY UR: CLEAR
CO2 SERPL-SCNC: 28.1 MMOL/L (ref 22–29)
COLOR UR: YELLOW
CREAT SERPL-MCNC: 0.93 MG/DL (ref 0.57–1)
DEPRECATED RDW RBC AUTO: 42.8 FL (ref 37–54)
EOSINOPHIL # BLD AUTO: 0.08 10*3/MM3 (ref 0–0.4)
EOSINOPHIL NFR BLD AUTO: 1.1 % (ref 0.3–6.2)
ERYTHROCYTE [DISTWIDTH] IN BLOOD BY AUTOMATED COUNT: 13 % (ref 12.3–15.4)
GFR SERPL CREATININE-BSD FRML MDRD: 58 ML/MIN/1.73
GLOBULIN UR ELPH-MCNC: 3.9 GM/DL
GLUCOSE SERPL-MCNC: 122 MG/DL (ref 65–99)
GLUCOSE UR STRIP-MCNC: NEGATIVE MG/DL
HCT VFR BLD AUTO: 35 % (ref 34–46.6)
HGB BLD-MCNC: 11.4 G/DL (ref 12–15.9)
HGB UR QL STRIP.AUTO: NEGATIVE
HYALINE CASTS UR QL AUTO: NORMAL /LPF
IMM GRANULOCYTES # BLD AUTO: 0.03 10*3/MM3 (ref 0–0.05)
IMM GRANULOCYTES NFR BLD AUTO: 0.4 % (ref 0–0.5)
INR PPP: 1.04 (ref 0.93–1.1)
KETONES UR QL STRIP: NEGATIVE
LEUKOCYTE ESTERASE UR QL STRIP.AUTO: ABNORMAL
LYMPHOCYTES # BLD AUTO: 1.57 10*3/MM3 (ref 0.7–3.1)
LYMPHOCYTES NFR BLD AUTO: 21.7 % (ref 19.6–45.3)
MCH RBC QN AUTO: 28.9 PG (ref 26.6–33)
MCHC RBC AUTO-ENTMCNC: 32.6 G/DL (ref 31.5–35.7)
MCV RBC AUTO: 88.8 FL (ref 79–97)
MONOCYTES # BLD AUTO: 0.48 10*3/MM3 (ref 0.1–0.9)
MONOCYTES NFR BLD AUTO: 6.6 % (ref 5–12)
MRSA DNA SPEC QL NAA+PROBE: NORMAL
NEUTROPHILS NFR BLD AUTO: 5 10*3/MM3 (ref 1.7–7)
NEUTROPHILS NFR BLD AUTO: 69.4 % (ref 42.7–76)
NITRITE UR QL STRIP: NEGATIVE
NRBC BLD AUTO-RTO: 0 /100 WBC (ref 0–0.2)
PH UR STRIP.AUTO: 7 [PH] (ref 5–8)
PLATELET # BLD AUTO: 306 10*3/MM3 (ref 140–450)
PMV BLD AUTO: 10 FL (ref 6–12)
POTASSIUM SERPL-SCNC: 4 MMOL/L (ref 3.5–5.2)
PROT SERPL-MCNC: 8.5 G/DL (ref 6–8.5)
PROT UR QL STRIP: NEGATIVE
PROTHROMBIN TIME: 11.4 SECONDS (ref 9.6–11.7)
RBC # BLD AUTO: 3.94 10*6/MM3 (ref 3.77–5.28)
RBC # UR: NORMAL /HPF
REF LAB TEST METHOD: NORMAL
RH BLD: NEGATIVE
SODIUM SERPL-SCNC: 141 MMOL/L (ref 136–145)
SP GR UR STRIP: 1.01 (ref 1–1.03)
SQUAMOUS #/AREA URNS HPF: NORMAL /HPF
T&S EXPIRATION DATE: NORMAL
UROBILINOGEN UR QL STRIP: ABNORMAL
WBC # BLD AUTO: 7.22 10*3/MM3 (ref 3.4–10.8)
WBC UR QL AUTO: NORMAL /HPF

## 2020-10-07 PROCEDURE — 86850 RBC ANTIBODY SCREEN: CPT | Performed by: NEUROLOGICAL SURGERY

## 2020-10-07 PROCEDURE — 86901 BLOOD TYPING SEROLOGIC RH(D): CPT | Performed by: NEUROLOGICAL SURGERY

## 2020-10-07 PROCEDURE — 36415 COLL VENOUS BLD VENIPUNCTURE: CPT

## 2020-10-07 PROCEDURE — 93005 ELECTROCARDIOGRAM TRACING: CPT | Performed by: NEUROLOGICAL SURGERY

## 2020-10-07 PROCEDURE — 71046 X-RAY EXAM CHEST 2 VIEWS: CPT

## 2020-10-07 PROCEDURE — 86900 BLOOD TYPING SEROLOGIC ABO: CPT | Performed by: NEUROLOGICAL SURGERY

## 2020-10-07 PROCEDURE — 85025 COMPLETE CBC W/AUTO DIFF WBC: CPT

## 2020-10-07 PROCEDURE — 85730 THROMBOPLASTIN TIME PARTIAL: CPT

## 2020-10-07 PROCEDURE — 81001 URINALYSIS AUTO W/SCOPE: CPT

## 2020-10-07 PROCEDURE — 85610 PROTHROMBIN TIME: CPT

## 2020-10-07 PROCEDURE — 93010 ELECTROCARDIOGRAM REPORT: CPT | Performed by: INTERNAL MEDICINE

## 2020-10-07 PROCEDURE — 86900 BLOOD TYPING SEROLOGIC ABO: CPT

## 2020-10-07 PROCEDURE — 87641 MR-STAPH DNA AMP PROBE: CPT

## 2020-10-07 PROCEDURE — 86901 BLOOD TYPING SEROLOGIC RH(D): CPT

## 2020-10-07 PROCEDURE — 80053 COMPREHEN METABOLIC PANEL: CPT

## 2020-10-08 ENCOUNTER — OFFICE VISIT (OUTPATIENT)
Dept: SURGERY | Facility: CLINIC | Age: 80
End: 2020-10-08

## 2020-10-08 ENCOUNTER — PREP FOR SURGERY (OUTPATIENT)
Dept: OTHER | Facility: HOSPITAL | Age: 80
End: 2020-10-08

## 2020-10-08 ENCOUNTER — TELEPHONE (OUTPATIENT)
Dept: NEUROSURGERY | Facility: CLINIC | Age: 80
End: 2020-10-08

## 2020-10-08 VITALS
BODY MASS INDEX: 32.96 KG/M2 | SYSTOLIC BLOOD PRESSURE: 172 MMHG | OXYGEN SATURATION: 97 % | HEART RATE: 108 BPM | WEIGHT: 186 LBS | HEIGHT: 63 IN | TEMPERATURE: 97.3 F | DIASTOLIC BLOOD PRESSURE: 87 MMHG

## 2020-10-08 DIAGNOSIS — G89.29 OTHER CHRONIC PAIN: ICD-10-CM

## 2020-10-08 DIAGNOSIS — I27.20 PULMONARY HYPERTENSION (HCC): ICD-10-CM

## 2020-10-08 DIAGNOSIS — I50.33 ACUTE ON CHRONIC DIASTOLIC CONGESTIVE HEART FAILURE (HCC): ICD-10-CM

## 2020-10-08 DIAGNOSIS — I48.0 PAROXYSMAL ATRIAL FIBRILLATION (HCC): ICD-10-CM

## 2020-10-08 DIAGNOSIS — M54.16 LUMBAR RADICULOPATHY: Primary | ICD-10-CM

## 2020-10-08 DIAGNOSIS — Z79.899 OTHER LONG TERM (CURRENT) DRUG THERAPY: ICD-10-CM

## 2020-10-08 PROBLEM — D64.9 ANEMIA: Status: ACTIVE | Noted: 2019-12-07

## 2020-10-08 PROBLEM — N18.2 STAGE 2 CHRONIC KIDNEY DISEASE: Status: ACTIVE | Noted: 2020-10-08

## 2020-10-08 PROBLEM — M54.50 CHRONIC LOW BACK PAIN: Status: ACTIVE | Noted: 2019-12-07

## 2020-10-08 PROBLEM — M48.02 CERVICAL STENOSIS OF SPINAL CANAL: Status: ACTIVE | Noted: 2020-10-08

## 2020-10-08 PROBLEM — M50.30 DDD (DEGENERATIVE DISC DISEASE), CERVICAL: Status: ACTIVE | Noted: 2020-10-08

## 2020-10-08 PROBLEM — E55.9 VITAMIN D DEFICIENCY: Status: ACTIVE | Noted: 2020-10-08

## 2020-10-08 PROCEDURE — 99203 OFFICE O/P NEW LOW 30 MIN: CPT | Performed by: THORACIC SURGERY (CARDIOTHORACIC VASCULAR SURGERY)

## 2020-10-08 NOTE — TELEPHONE ENCOUNTER
CALLED PATIENT TO LET HER KNOW THAT PER DR MCDOWELL AND DR ALLEN WILL BE CANCELING HER SURGERY FOR NOW. PATIENT VERBALLY AGREED

## 2020-10-08 NOTE — PROGRESS NOTES
Subjective   Chief Complaint   Patient presents with   • Back Pain     New patient combined case Dr. Coppola   Catalinajd Moreno is a 80 y.o. female.      History of Present Illness  80-year-old woman with a history of back pain severe over the past 8 to 9 months.  She has been evaluated by Dr. Coppola and deemed appropriate for an L2-L5 anterior arthrodesis.  The patient does not voice a clear understanding of the procedure and its risks and benefits from a spine standpoint.  She has a history of diastolic heart failure.  The patient has been cleared from cardiology standpoint.  The following portions of the patient's history were reviewed and updated as appropriate: allergies, current medications, past family history, past medical history, past social history, past surgical history and problem list.    I have personally reviewed the following radiographs and reviewed the radiology reports.  My independent finds are              I have reviewed the hospital record and reviewed the accompanying physician notes.  I have discussed the case with the following physicians: Needs clarification regarding surgical principles with Dr. Coppola.    Past Medical History:   Diagnosis Date   • Anxiety    • Arthritis    • Atrial fibrillation (CMS/HCC)     paroxysmal   • Broken shoulder     left-- due to fall 11-7-19 was at [facility]   • Buttock pain     rt side   • DDD (degenerative disc disease), lumbar    • Depression    • Edema     9/2020 foot   • GERD (gastroesophageal reflux disease)    • H/O fall    • Hip pain     rt   • Hypertension    • Hypothyroidism    • Insomnia    • Leg pain     rt   • Low back pain    • Neuropathy    • PONV (postoperative nausea and vomiting)    • Pulmonary hypertension (CMS/HCC)    • Radiculopathy    • Restless legs    • Spondylolisthesis    • Urinary incontinence      Social History     Social History Narrative   • Not on file     Social History     Tobacco Use   Smoking Status Never Smoker   Smokeless  Tobacco Never Used     Family History   Problem Relation Age of Onset   • Cancer Father    • Diabetes Son    • Cancer Paternal Aunt    • Heart disease Paternal Aunt    • Cancer Paternal Uncle        Current Outpatient Medications:   •  apixaban (ELIQUIS) 5 MG tablet tablet, Take 1 tablet by mouth Every 12 (Twelve) Hours. (Patient taking differently: Take 5 mg by mouth Every 12 (Twelve) Hours. Last dose 10/9 am), Disp: 180 tablet, Rfl: 3  •  cyclobenzaprine (FLEXERIL) 10 MG tablet, Take 1 tablet by mouth 3 (Three) Times a Day As Needed for Muscle Spasms., Disp: 60 tablet, Rfl: 0  •  famotidine (PEPCID) 20 MG tablet, Take 20 mg by mouth 2 (Two) Times a Day., Disp: , Rfl: 0  •  furosemide (LASIX) 20 MG tablet, Take 20 mg by mouth 2 (Two) Times a Day. Do not take preop, Disp: , Rfl: 0  •  gabapentin (NEURONTIN) 600 MG tablet, Take 1 tablet by mouth 4 (Four) Times a Day. (Patient taking differently: Take 600 mg by mouth 4 (Four) Times a Day As Needed. Take preop), Disp: 120 tablet, Rfl: 5  •  HYDROcodone-acetaminophen (NORCO) 7.5-325 MG per tablet, Take 1 tablet by mouth 4 (Four) Times a Day As Needed for Severe Pain ., Disp: 120 tablet, Rfl: 0  •  [START ON 11/6/2020] HYDROcodone-acetaminophen (NORCO) 7.5-325 MG per tablet, Take 1 tablet by mouth 4 (Four) Times a Day As Needed for Severe Pain . DNF before 11/6/2020 (Patient taking differently: Take 1 tablet by mouth 4 (Four) Times a Day As Needed for Severe Pain . DNF before 11/6/2020 take if needed preop), Disp: 120 tablet, Rfl: 0  •  levothyroxine (SYNTHROID, LEVOTHROID) 75 MCG tablet, Take 75 mcg by mouth Daily. Take preop, Disp: , Rfl: 0  •  lidocaine (LIDODERM) 5 %, Place 2 patches on the skin as directed by provider Daily. Remove & Discard patch within 12 hours or as directed by MD (Patient taking differently: Place 2 patches on the skin as directed by provider Daily. Remove & Discard patch within 12 hours or as directed by MD  Back or knee  dont use preop), Disp:  "60 patch, Rfl: 11  •  methocarbamol (ROBAXIN) 500 MG tablet, Take 500 mg by mouth 2 (Two) Times a Day., Disp: , Rfl:   •  metoprolol succinate XL (TOPROL-XL) 50 MG 24 hr tablet, Take 50 mg by mouth Daily. Take preop, Disp: , Rfl:   •  pantoprazole (PROTONIX) 40 MG EC tablet, Take 40 mg by mouth Daily. Afternoons, Disp: , Rfl: 0  •  rOPINIRole (REQUIP) 0.25 MG tablet, TAKE 1 TABLET BY MOUTH EVERY NIGHT 1 HOUR BEFORE BEDTIME (Patient taking differently: 0.25 mg Every Night.), Disp: 30 tablet, Rfl: 5  •  sertraline (ZOLOFT) 50 MG tablet, Take 50 mg by mouth Every Night., Disp: , Rfl:   •  traZODone (DESYREL) 50 MG tablet, Take 25 mg by mouth Every Night., Disp: , Rfl:       REVIEW OF SYSTEMS  All systems reviewed.  Reviewed with the patient scanned in the chart    Objective   /87 (BP Location: Left arm, Patient Position: Sitting, Cuff Size: Adult)   Pulse 108   Temp 97.3 °F (36.3 °C) (Oral)   Ht 160 cm (63\")   Wt 84.4 kg (186 lb)   SpO2 97%   BMI 32.95 kg/m²   Physical Exam frail elderly appearing woman moderately obese no acute distress.  Sclera anicteric wearing a mask over 19 precautions in place.  Symmetrical chest expansion.  No lower extremity edema.  Abdomen nondistended nontender.  Prior vertical hysterectomy scar.  Abdomen soft no masses.  Extremities free of cyanosis or clubbing.  Mentation normal mood and affect normal.  Patient is very concerned about lack of understanding of the surgical procedure.      Assessment/Plan     Multiple level listhesis.  Patient needs to follow-up with Dr. Coppola to have greater clarity regarding the surgical procedures risks and benefits.  Greater than 30 minutes in direct face-to-face time of been spent with the patient discussing the procedure her concerns separate from the physical examination and history taking.    Procedures    Edy Daniels MD  10/8/2020  15:21 EDT    "

## 2020-11-02 ENCOUNTER — HOSPITAL ENCOUNTER (EMERGENCY)
Facility: HOSPITAL | Age: 80
Discharge: HOME | End: 2020-11-02
Attending: EMERGENCY MEDICINE | Admitting: EMERGENCY MEDICINE

## 2020-11-02 VITALS
TEMPERATURE: 98.5 F | BODY MASS INDEX: 30.73 KG/M2 | HEIGHT: 64 IN | OXYGEN SATURATION: 96 % | SYSTOLIC BLOOD PRESSURE: 143 MMHG | RESPIRATION RATE: 18 BRPM | HEART RATE: 97 BPM | DIASTOLIC BLOOD PRESSURE: 48 MMHG | WEIGHT: 180 LBS

## 2020-11-02 DIAGNOSIS — R53.1 WEAKNESS: Primary | ICD-10-CM

## 2020-11-02 DIAGNOSIS — E86.0 DEHYDRATION: ICD-10-CM

## 2020-11-02 LAB
ALBUMIN SERPL-MCNC: 4.8 G/DL (ref 3.5–5.2)
ALBUMIN/GLOB SERPL: 1.3 G/DL
ALP SERPL-CCNC: 119 U/L (ref 39–117)
ALT SERPL W P-5'-P-CCNC: 9 U/L (ref 1–33)
ANION GAP SERPL CALCULATED.3IONS-SCNC: 14 MMOL/L (ref 5–15)
AST SERPL-CCNC: 15 U/L (ref 1–32)
BACTERIA UR QL AUTO: ABNORMAL /HPF
BASOPHILS # BLD AUTO: 0.1 10*3/MM3 (ref 0–0.2)
BASOPHILS NFR BLD AUTO: 1.2 % (ref 0–1.5)
BILIRUB SERPL-MCNC: 0.5 MG/DL (ref 0–1.2)
BILIRUB UR QL STRIP: NEGATIVE
BUN SERPL-MCNC: 19 MG/DL (ref 8–23)
BUN/CREAT SERPL: 17.8 (ref 7–25)
CALCIUM SPEC-SCNC: 10 MG/DL (ref 8.6–10.5)
CHLORIDE SERPL-SCNC: 96 MMOL/L (ref 98–107)
CLARITY UR: ABNORMAL
CO2 SERPL-SCNC: 30 MMOL/L (ref 22–29)
COLOR UR: YELLOW
CREAT SERPL-MCNC: 1.07 MG/DL (ref 0.57–1)
DEPRECATED RDW RBC AUTO: 45.5 FL (ref 37–54)
EOSINOPHIL # BLD AUTO: 0 10*3/MM3 (ref 0–0.4)
EOSINOPHIL NFR BLD AUTO: 0.5 % (ref 0.3–6.2)
ERYTHROCYTE [DISTWIDTH] IN BLOOD BY AUTOMATED COUNT: 14.8 % (ref 12.3–15.4)
GFR SERPL CREATININE-BSD FRML MDRD: 49 ML/MIN/1.73
GLOBULIN UR ELPH-MCNC: 3.8 GM/DL
GLUCOSE SERPL-MCNC: 116 MG/DL (ref 65–99)
GLUCOSE UR STRIP-MCNC: NEGATIVE MG/DL
HCT VFR BLD AUTO: 42.8 % (ref 34–46.6)
HGB BLD-MCNC: 13.8 G/DL (ref 12–15.9)
HGB UR QL STRIP.AUTO: NEGATIVE
HYALINE CASTS UR QL AUTO: ABNORMAL /LPF
KETONES UR QL STRIP: NEGATIVE
LEUKOCYTE ESTERASE UR QL STRIP.AUTO: ABNORMAL
LYMPHOCYTES # BLD AUTO: 3.1 10*3/MM3 (ref 0.7–3.1)
LYMPHOCYTES NFR BLD AUTO: 29.7 % (ref 19.6–45.3)
MAGNESIUM SERPL-MCNC: 2 MG/DL (ref 1.6–2.4)
MCH RBC QN AUTO: 28.7 PG (ref 26.6–33)
MCHC RBC AUTO-ENTMCNC: 32.3 G/DL (ref 31.5–35.7)
MCV RBC AUTO: 88.9 FL (ref 79–97)
MONOCYTES # BLD AUTO: 0.7 10*3/MM3 (ref 0.1–0.9)
MONOCYTES NFR BLD AUTO: 6.8 % (ref 5–12)
NEUTROPHILS NFR BLD AUTO: 6.4 10*3/MM3 (ref 1.7–7)
NEUTROPHILS NFR BLD AUTO: 61.8 % (ref 42.7–76)
NITRITE UR QL STRIP: NEGATIVE
NRBC BLD AUTO-RTO: 0.2 /100 WBC (ref 0–0.2)
PH UR STRIP.AUTO: 5.5 [PH] (ref 5–8)
PLATELET # BLD AUTO: 312 10*3/MM3 (ref 140–450)
PMV BLD AUTO: 7.8 FL (ref 6–12)
POTASSIUM SERPL-SCNC: 3.9 MMOL/L (ref 3.5–5.2)
PROT SERPL-MCNC: 8.6 G/DL (ref 6–8.5)
PROT UR QL STRIP: NEGATIVE
RBC # BLD AUTO: 4.81 10*6/MM3 (ref 3.77–5.28)
RBC # UR: ABNORMAL /HPF
REF LAB TEST METHOD: ABNORMAL
SODIUM SERPL-SCNC: 140 MMOL/L (ref 136–145)
SP GR UR STRIP: 1.01 (ref 1–1.03)
SQUAMOUS #/AREA URNS HPF: ABNORMAL /HPF
TROPONIN T SERPL-MCNC: <0.01 NG/ML (ref 0–0.03)
TSH SERPL DL<=0.05 MIU/L-ACNC: 0.91 UIU/ML (ref 0.27–4.2)
UROBILINOGEN UR QL STRIP: ABNORMAL
WBC # BLD AUTO: 10.4 10*3/MM3 (ref 3.4–10.8)
WBC UR QL AUTO: ABNORMAL /HPF

## 2020-11-02 PROCEDURE — 81001 URINALYSIS AUTO W/SCOPE: CPT | Performed by: EMERGENCY MEDICINE

## 2020-11-02 PROCEDURE — 80053 COMPREHEN METABOLIC PANEL: CPT | Performed by: EMERGENCY MEDICINE

## 2020-11-02 PROCEDURE — 84484 ASSAY OF TROPONIN QUANT: CPT | Performed by: EMERGENCY MEDICINE

## 2020-11-02 PROCEDURE — 93005 ELECTROCARDIOGRAM TRACING: CPT | Performed by: EMERGENCY MEDICINE

## 2020-11-02 PROCEDURE — 85025 COMPLETE CBC W/AUTO DIFF WBC: CPT | Performed by: EMERGENCY MEDICINE

## 2020-11-02 PROCEDURE — 99284 EMERGENCY DEPT VISIT MOD MDM: CPT

## 2020-11-02 PROCEDURE — 84443 ASSAY THYROID STIM HORMONE: CPT | Performed by: EMERGENCY MEDICINE

## 2020-11-02 PROCEDURE — 83735 ASSAY OF MAGNESIUM: CPT | Performed by: EMERGENCY MEDICINE

## 2020-11-02 RX ORDER — SODIUM CHLORIDE 0.9 % (FLUSH) 0.9 %
10 SYRINGE (ML) INJECTION AS NEEDED
Status: DISCONTINUED | OUTPATIENT
Start: 2020-11-02 | End: 2020-11-03 | Stop reason: HOSPADM

## 2020-11-02 RX ADMIN — SODIUM CHLORIDE 500 ML: 0.9 INJECTION, SOLUTION INTRAVENOUS at 19:30

## 2020-11-02 NOTE — ED PROVIDER NOTES
Subjective   History of Present Illness  General weakness  80-year-old female states has had some general weakness on and off for the last few weeks.  She states is worse when she stands up.  She denies focal numbness or weakness in the extremities and reports no headache.  She denies vomiting or diarrhea or chest or abdominal pain.  She states symptoms were not worsened today.  She reports no bleeding or trauma or syncope  Review of Systems   HENT: Negative.    Eyes: Negative.    Respiratory: Negative.    Cardiovascular: Negative.    Gastrointestinal: Negative.    Genitourinary: Negative.    Musculoskeletal: Negative.    Skin: Negative.    Neurological: Positive for weakness. Negative for dizziness, seizures, numbness and headaches.   Psychiatric/Behavioral: Negative.        Past Medical History:   Diagnosis Date   • Anxiety    • Arthritis    • Atrial fibrillation (CMS/HCC)     paroxysmal   • Broken shoulder     left-- due to fall 11-7-19 was at [facility]   • Buttock pain     rt side   • DDD (degenerative disc disease), lumbar    • Depression    • Edema     9/2020 foot   • GERD (gastroesophageal reflux disease)    • H/O fall    • Hip pain     rt   • Hypertension    • Hypothyroidism    • Insomnia    • Leg pain     rt   • Low back pain    • Neuropathy    • PONV (postoperative nausea and vomiting)    • Pulmonary hypertension (CMS/HCC)    • Radiculopathy    • Restless legs    • Spondylolisthesis    • Urinary incontinence        Allergies   Allergen Reactions   • Baclofen Rash   • Codeine Nausea Only   • Ibuprofen Unknown (See Comments)     Patient doesn't know----Motin   • Methocarbamol Unknown (See Comments)     Patient doesn't know   • Naproxen Unknown (See Comments)     Pt. Doesn't know    • Tizanidine Hcl Hallucinations   • Tolmetin Dizziness     Pt. Doesn't know-- same as Tolectin       Past Surgical History:   Procedure Laterality Date   • BACK SURGERY  07/19/2018    PDC &  PSF L3-L5 insitu   • CHOLECYSTECTOMY     •  COLONOSCOPY     • HYSTERECTOMY     • ROTATOR CUFF REPAIR Left        Family History   Problem Relation Age of Onset   • Cancer Father    • Diabetes Son    • Cancer Paternal Aunt    • Heart disease Paternal Aunt    • Cancer Paternal Uncle        Social History     Socioeconomic History   • Marital status:      Spouse name: Not on file   • Number of children: Not on file   • Years of education: Not on file   • Highest education level: Not on file   Tobacco Use   • Smoking status: Never Smoker   • Smokeless tobacco: Never Used   Substance and Sexual Activity   • Alcohol use: No   • Drug use: No   • Sexual activity: Defer     Prior to Admission medications    Medication Sig Start Date End Date Taking? Authorizing Provider   apixaban (ELIQUIS) 5 MG tablet tablet Take 1 tablet by mouth Every 12 (Twelve) Hours.  Patient taking differently: Take 5 mg by mouth Every 12 (Twelve) Hours. Last dose 10/9 am 8/17/20   Barrett Evans MD   cyclobenzaprine (FLEXERIL) 10 MG tablet Take 1 tablet by mouth 3 (Three) Times a Day As Needed for Muscle Spasms. 9/16/20   More Gamble APRN   famotidine (PEPCID) 20 MG tablet Take 20 mg by mouth 2 (Two) Times a Day. 6/19/19   Elton Flores MD   furosemide (LASIX) 20 MG tablet Take 20 mg by mouth 2 (Two) Times a Day. Do not take preop 6/27/19   Elton Flores MD   gabapentin (NEURONTIN) 600 MG tablet Take 1 tablet by mouth 4 (Four) Times a Day.  Patient taking differently: Take 600 mg by mouth 4 (Four) Times a Day As Needed. Take preop 7/31/20   Shefali Worthy MD   HYDROcodone-acetaminophen (NORCO) 7.5-325 MG per tablet Take 1 tablet by mouth 4 (Four) Times a Day As Needed for Severe Pain . 10/7/20   Shefali Worthy MD   HYDROcodone-acetaminophen (NORCO) 7.5-325 MG per tablet Take 1 tablet by mouth 4 (Four) Times a Day As Needed for Severe Pain . DNF before 11/6/2020  Patient taking differently: Take 1 tablet by mouth 4 (Four) Times a Day As Needed  "for Severe Pain . DNF before 11/6/2020 take if needed preop 11/6/20   Shefali Worthy MD   levothyroxine (SYNTHROID, LEVOTHROID) 75 MCG tablet Take 75 mcg by mouth Daily. Take preop 6/24/19   Elton Flores MD   lidocaine (LIDODERM) 5 % Place 2 patches on the skin as directed by provider Daily. Remove & Discard patch within 12 hours or as directed by MD  Patient taking differently: Place 2 patches on the skin as directed by provider Daily. Remove & Discard patch within 12 hours or as directed by MD  Back or knee  dont use preop 6/9/20   Shefali Worthy MD   methocarbamol (ROBAXIN) 500 MG tablet Take 500 mg by mouth 2 (Two) Times a Day. 5/2/20   Elton Flores MD   metoprolol succinate XL (TOPROL-XL) 50 MG 24 hr tablet Take 50 mg by mouth Daily. Take preop    Elton Flores MD   pantoprazole (PROTONIX) 40 MG EC tablet Take 40 mg by mouth Daily. Afternoons 6/24/19   Elton Flores MD   rOPINIRole (REQUIP) 0.25 MG tablet TAKE 1 TABLET BY MOUTH EVERY NIGHT 1 HOUR BEFORE BEDTIME  Patient taking differently: 0.25 mg Every Night. 10/6/20   Shefali Worthy MD   sertraline (ZOLOFT) 50 MG tablet Take 50 mg by mouth Every Night. 6/4/20   Elton Flores MD   traZODone (DESYREL) 50 MG tablet Take 25 mg by mouth Every Night. 6/4/20   Elton Flores MD     /43 (Patient Position: Standing)   Pulse 117   Temp 98.5 °F (36.9 °C)   Resp 18   Ht 162.6 cm (64\")   Wt 81.6 kg (180 lb)   SpO2 97%   BMI 30.90 kg/m²   I examined the patient using the appropriate personal protective equipment.          Objective   Physical Exam  General: Well-developed well-appearing, no acute distress, alert and appropriate  Eyes: Pupils round and reactive, sclera nonicteric  HEENT: Mucous membranes somewhat dry, no mucosal swelling  Neck: Supple, no nuchal rigidity, no lymphadenopathy  Respirations: Respirations nonlabored, equal breath sounds bilaterally, clear lungs  Heart regular rate and " rhythm, no murmurs rubs or gallops,   Abdomen soft nontender nondistended, no hepatosplenomegaly, no hernia, no mass, normal bowel sounds, no CVA tenderness  Extremities no clubbing cyanosis or edema, calves are symmetric and nontender  Neuro cranial nerves II through XII intact , normal sensory/motor function and strength in four extremities, no slurred speech, no facial droop,   no nuchal rigidity  Psych oriented, pleasant affect  Skin no rash, brisk cap refill  Procedures           ED Course      Results for orders placed or performed during the hospital encounter of 11/02/20   Comprehensive Metabolic Panel    Specimen: Blood   Result Value Ref Range    Glucose 116 (H) 65 - 99 mg/dL    BUN 19 8 - 23 mg/dL    Creatinine 1.07 (H) 0.57 - 1.00 mg/dL    Sodium 140 136 - 145 mmol/L    Potassium 3.9 3.5 - 5.2 mmol/L    Chloride 96 (L) 98 - 107 mmol/L    CO2 30.0 (H) 22.0 - 29.0 mmol/L    Calcium 10.0 8.6 - 10.5 mg/dL    Total Protein 8.6 (H) 6.0 - 8.5 g/dL    Albumin 4.80 3.50 - 5.20 g/dL    ALT (SGPT) 9 1 - 33 U/L    AST (SGOT) 15 1 - 32 U/L    Alkaline Phosphatase 119 (H) 39 - 117 U/L    Total Bilirubin 0.5 0.0 - 1.2 mg/dL    eGFR Non African Amer 49 (L) >60 mL/min/1.73    Globulin 3.8 gm/dL    A/G Ratio 1.3 g/dL    BUN/Creatinine Ratio 17.8 7.0 - 25.0    Anion Gap 14.0 5.0 - 15.0 mmol/L   Urinalysis With Culture If Indicated - Urine, Clean Catch    Specimen: Urine, Clean Catch   Result Value Ref Range    Color, UA Yellow Yellow, Straw    Appearance, UA Cloudy (A) Clear    pH, UA 5.5 5.0 - 8.0    Specific Gravity, UA 1.015 1.005 - 1.030    Glucose, UA Negative Negative    Ketones, UA Negative Negative    Bilirubin, UA Negative Negative    Blood, UA Negative Negative    Protein, UA Negative Negative    Leuk Esterase, UA Trace (A) Negative    Nitrite, UA Negative Negative    Urobilinogen, UA 0.2 E.U./dL 0.2 - 1.0 E.U./dL   Troponin    Specimen: Blood   Result Value Ref Range    Troponin T <0.010 0.000 - 0.030 ng/mL    Magnesium    Specimen: Blood   Result Value Ref Range    Magnesium 2.0 1.6 - 2.4 mg/dL   TSH    Specimen: Blood   Result Value Ref Range    TSH 0.910 0.270 - 4.200 uIU/mL   CBC Auto Differential    Specimen: Blood   Result Value Ref Range    WBC 10.40 3.40 - 10.80 10*3/mm3    RBC 4.81 3.77 - 5.28 10*6/mm3    Hemoglobin 13.8 12.0 - 15.9 g/dL    Hematocrit 42.8 34.0 - 46.6 %    MCV 88.9 79.0 - 97.0 fL    MCH 28.7 26.6 - 33.0 pg    MCHC 32.3 31.5 - 35.7 g/dL    RDW 14.8 12.3 - 15.4 %    RDW-SD 45.5 37.0 - 54.0 fl    MPV 7.8 6.0 - 12.0 fL    Platelets 312 140 - 450 10*3/mm3    Neutrophil % 61.8 42.7 - 76.0 %    Lymphocyte % 29.7 19.6 - 45.3 %    Monocyte % 6.8 5.0 - 12.0 %    Eosinophil % 0.5 0.3 - 6.2 %    Basophil % 1.2 0.0 - 1.5 %    Neutrophils, Absolute 6.40 1.70 - 7.00 10*3/mm3    Lymphocytes, Absolute 3.10 0.70 - 3.10 10*3/mm3    Monocytes, Absolute 0.70 0.10 - 0.90 10*3/mm3    Eosinophils, Absolute 0.00 0.00 - 0.40 10*3/mm3    Basophils, Absolute 0.10 0.00 - 0.20 10*3/mm3    nRBC 0.2 0.0 - 0.2 /100 WBC   Urinalysis, Microscopic Only - Urine, Clean Catch    Specimen: Urine, Clean Catch   Result Value Ref Range    RBC, UA 0-2 (A) None Seen /HPF    WBC, UA 3-5 (A) None Seen /HPF    Bacteria, UA None Seen None Seen /HPF    Squamous Epithelial Cells, UA 13-20 (A) None Seen, 0-2 /HPF    Hyaline Casts, UA None Seen None Seen /LPF    Methodology Manual Light Microscopy    ECG 12 Lead   Result Value Ref Range    QT Interval 338 ms                                          MDM  Patient describes nonspecific weakness.  She does have some mild orthostatic changes she is not syncopal or near syncopal.  She was given some IV fluids.  She does appear to be mildly dehydrated.  She does not have urinary tract infection or any respiratory illness she does not have focal deficits to suggest stroke or pain or dyspnea to suggest ACS.  She is resting comfortably on reexamination she was advised the findings she is discharged good  condition she has a follow-up appoint with her doctor this week scheduled.  She was given warning signs for return  Final diagnoses:   Weakness   Dehydration            Alvin Stewart MD  11/02/20 [id number]

## 2020-11-03 LAB — QT INTERVAL: 338 MS

## 2020-11-03 NOTE — DISCHARGE INSTRUCTIONS
Rest, drink plenty of fluids, follow-up with your doctor this week as scheduled.  Return for increased weakness, increased pain, shortness of breath or any other concerns

## 2020-11-30 RX ORDER — METOPROLOL SUCCINATE 50 MG/1
TABLET, EXTENDED RELEASE ORAL
Qty: 90 TABLET | Refills: 3 | Status: SHIPPED | OUTPATIENT
Start: 2020-11-30 | End: 2021-09-07

## 2020-12-01 ENCOUNTER — OFFICE VISIT (OUTPATIENT)
Dept: PAIN MEDICINE | Facility: CLINIC | Age: 80
End: 2020-12-01

## 2020-12-01 VITALS — BODY MASS INDEX: 30.73 KG/M2 | WEIGHT: 180 LBS | HEIGHT: 64 IN | RESPIRATION RATE: 16 BRPM

## 2020-12-01 DIAGNOSIS — Z79.899 HIGH RISK MEDICATION USE: ICD-10-CM

## 2020-12-01 DIAGNOSIS — G25.81 RESTLESS LEG: ICD-10-CM

## 2020-12-01 DIAGNOSIS — M96.1 POSTLAMINECTOMY SYNDROME OF LUMBAR REGION: ICD-10-CM

## 2020-12-01 DIAGNOSIS — G89.4 CHRONIC PAIN SYNDROME: Primary | ICD-10-CM

## 2020-12-01 DIAGNOSIS — M48.062 LUMBAR STENOSIS WITH NEUROGENIC CLAUDICATION: ICD-10-CM

## 2020-12-01 DIAGNOSIS — M54.16 LUMBAR RADICULITIS: ICD-10-CM

## 2020-12-01 PROCEDURE — 99443 PR PHYS/QHP TELEPHONE EVALUATION 21-30 MIN: CPT | Performed by: ANESTHESIOLOGY

## 2020-12-01 RX ORDER — HYDROCODONE BITARTRATE AND ACETAMINOPHEN 7.5; 325 MG/1; MG/1
1 TABLET ORAL 4 TIMES DAILY PRN
Qty: 120 TABLET | Refills: 0 | Status: SHIPPED | OUTPATIENT
Start: 2020-12-04 | End: 2021-03-01 | Stop reason: SDUPTHER

## 2020-12-01 RX ORDER — HYDROCODONE BITARTRATE AND ACETAMINOPHEN 7.5; 325 MG/1; MG/1
1 TABLET ORAL 4 TIMES DAILY PRN
Qty: 120 TABLET | Refills: 0 | Status: SHIPPED | OUTPATIENT
Start: 2021-01-04 | End: 2021-02-02 | Stop reason: SDUPTHER

## 2020-12-02 NOTE — PROGRESS NOTES
CC back pain, jonah leg pain  80-year-old female with HTN, depression, chronic polyarthralgia from osteoarthritis, chronic back pain S/p L4-5 laminectomy with bony fusion 2018/Noelle., here for follow-up by tel.  Good relief with last caudal STEPHAN.. Seen neurosurgery/[facility]anks, discussed ALIF.  Cchronic back pain bilateral lower extremity radicular pain and neurogenic claudication symptoms.  Denies saddle anesthesia, bladder or bowel incontinence.  Chronic polyarthralgia / left knee pain/ bilateral feet neuropathic pain    Still  limited in ADL due to bilateral lower extremity pain and weakness.  Ambulating with walker    Utilizes gabapentin, hydrocodone with good relief functional benefit without side effects.      L-Spine x-ray 2019 Degenerative changes of the lumbar spine with stable 6 mm anterolisthesis L4 upon L5  L-spine MRI 2019: postoperative changes are noted at L4 and L5 with laminectomies and posterior bony fusion. Degenerative changes are seen at multiple levels, greatest at L3-4, with moderate to severe spinal canal narrowing and moderate bilateral foraminal narrowing, greater on the left    C-spine CT 2017:  Degenerative changes spondylosis with central stenosis at C5-C6 and left foraminal stenosis C4/5    Pain Assessment   Location of Pain: Lower Back, R Hip, L Hip, R Leg, L Leg  Description of Pain: Dull/Aching, Throbbing, Stabbing  Previous Pain Rating : 8  Current Pain Ratin  Aggravating Factors: Activity  Alleviating Factors: Rest, Medication  Previous Treatments: Nerve Blocks/Injections, Epidural Steroids, Narcotic Pain Medication, Physical Therapy  Previous Diagnostic Studies: X-Ray, MRI    PEG Assessment   What number best describes your pain on average in the past week?10  What number best describes how, during the past week, pain has interfered with your enjoyment of life?8  What number best describes how, during the past week, pain has interfered with your general activity? 9     has a past  "medical history of Anxiety, Arthritis, Atrial fibrillation (CMS/HCC), Broken shoulder, Buttock pain, DDD (degenerative disc disease), lumbar, Depression, Edema, GERD (gastroesophageal reflux disease), H/O fall, Hip pain, Hypertension, Hypothyroidism, Insomnia, Leg pain, Low back pain, Neuropathy, PONV (postoperative nausea and vomiting), Pulmonary hypertension (CMS/HCC), Radiculopathy, Restless legs, Spondylolisthesis, and Urinary incontinence.     has a past surgical history that includes Hysterectomy; Rotator cuff repair (Left); Cholecystectomy; Colonoscopy; and Back surgery (07/19/2018).  .  Social History     Tobacco Use   • Smoking status: Never Smoker   • Smokeless tobacco: Never Used   Substance Use Topics   • Alcohol use: No       Review of Systems        See HPI    General       Denies fever/chills, fatigue and sleep problems.    Eyes       Denies blurry vision and double vision.    ENT       Denies decreased hearing, sore throat and ears ringing.    CV       Denies chest pain and fainting.    Resp       Denies shortness of breath and cough.    GI       Denies heartburn, constipation, nausea, vomiting and diarrhea.           Denies pain on urination, incontinence and increased frequency.    MS       Complains of joint swelling and cramps.       Denies weakness.       Right shoulder pain, low back pain, bilateral hip pain left knee.    Derm       Denies rash and itching.    Neuro       Denies numbness, tingling, loss of balance and history of seizures.    Psych       Denies anxiety and depression.    Endo       Denies weight change and thirsty all the time.    Heme       Denies easy bruising, bleeding and enlarged lymph nodes.    Vitals:    12/01/20 0927   Resp: 16   Weight: 81.6 kg (180 lb)   Height: 162.6 cm (64\")     Physical Exam   NAD.    PHQ9 on chart                    opioid risk tool high risk        Assessment/Plan  Diagnoses and all orders for this visit:    1. Chronic pain syndrome (Primary)  - "     HYDROcodone-acetaminophen (NORCO) 7.5-325 MG per tablet; Take 1 tablet by mouth 4 (Four) Times a Day As Needed for Severe Pain . DNF before 1/4/2021  Dispense: 120 tablet; Refill: 0  -     HYDROcodone-acetaminophen (NORCO) 7.5-325 MG per tablet; Take 1 tablet by mouth 4 (Four) Times a Day As Needed for Severe Pain .  Dispense: 120 tablet; Refill: 0    2. Postlaminectomy syndrome of lumbar region    3. Lumbar radiculitis  -     Ambulatory Referral to Pain Management    4. Lumbar stenosis with neurogenic claudication  -     Ambulatory Referral to Pain Management    5. Restless leg    6. High risk medication use    Summary:   80-year-old female with HTN, depression, chronic polyarthralgia from osteoarthritis, chronic back pain from DDD/ spondylosis/stenosis, S/p L4/5 laminectomy with bony fusion 7/2018/ Majd seen in  follow-up.     Chronic lower lumbar /coccyx pain and RLE radicular pain, continued bilateral lower extremity weakness.    Continued Worsening back/BLE pain from post laminectomy syndrome and lumbar stenosis.    Seen neurosurgery/[facility], considered ALIF but postponed    Schedule for repeat caudal STEPHAN.    Continue hydrocodone 7.5/325 4 times daily as needed for severe pain.  UDS and Inspect reviewed.   Discussed risk of tolerance, dependence, respiratory depression, coma and death associated with use of oral opioids for treatment of chronic nonmalignant pain.      Cont Lidocaine patch for worsening myofascial and neuropathic pain.   Cont requip for RLS at night    telemedicine done to avoid coronavirus exposure.    A phone visit was used to complete visit. Total time  25 minutes    RTC  in  2 mo.

## 2021-01-04 ENCOUNTER — TELEPHONE (OUTPATIENT)
Dept: PAIN MEDICINE | Facility: HOSPITAL | Age: 81
End: 2021-01-04

## 2021-01-20 ENCOUNTER — TELEPHONE (OUTPATIENT)
Dept: ORTHOPEDIC SURGERY | Facility: CLINIC | Age: 81
End: 2021-01-20

## 2021-01-20 NOTE — TELEPHONE ENCOUNTER
Caller: RADHA CARRERA    Relationship to patient: SELF     Best call back number: [phone]    Patient is needing: TO CANCEL THE APPT  ON 1/22/2021 ALSO WOULD LIKE TO TALK ABOUT THE APPT ON 1/25/2021 AS WELL AS WHAT SHE NEEDS TO DO ABOUT HER MONTHLY APPT

## 2021-01-22 ENCOUNTER — APPOINTMENT (OUTPATIENT)
Dept: PAIN MEDICINE | Facility: HOSPITAL | Age: 81
End: 2021-01-22

## 2021-02-01 ENCOUNTER — TELEPHONE (OUTPATIENT)
Dept: PAIN MEDICINE | Facility: CLINIC | Age: 81
End: 2021-02-01

## 2021-02-01 NOTE — TELEPHONE ENCOUNTER
Provider: DR. LEMUS   Caller: RADAH CARRERA Relationship to Patient: [id number]  PHONE NUMBER: [phone]  Reason for Call: PATIENT FELL AND HURT HER BACK- WANTS TO KNOW IF SHE SHOULD GO TO THE EMERGENCY DEPARTMENT OR WHAT HER NEXT STEPS SHOULD BE- PATIENT SAYS SHE IS IN TOO MUCH PAIN TO WAIT FOR A APPOINTMENT NEXT WEEK- UNABLE TO WARM TRANSFER   When was the patient last seen: 12-1-2020 [phone]

## 2021-02-02 DIAGNOSIS — G89.4 CHRONIC PAIN SYNDROME: ICD-10-CM

## 2021-02-02 RX ORDER — HYDROCODONE BITARTRATE AND ACETAMINOPHEN 7.5; 325 MG/1; MG/1
1 TABLET ORAL 4 TIMES DAILY PRN
Qty: 120 TABLET | Refills: 0 | Status: SHIPPED | OUTPATIENT
Start: 2021-02-02 | End: 2021-03-04

## 2021-02-02 NOTE — TELEPHONE ENCOUNTER
she needs schedule follow up, she states she is feeling better so doesn't want to go to ER. But she was sick on her las appt. And said she called us to cancel it.

## 2021-02-15 ENCOUNTER — APPOINTMENT (OUTPATIENT)
Dept: PAIN MEDICINE | Facility: CLINIC | Age: 81
End: 2021-02-15

## 2021-03-01 DIAGNOSIS — G89.4 CHRONIC PAIN SYNDROME: ICD-10-CM

## 2021-03-01 RX ORDER — HYDROCODONE BITARTRATE AND ACETAMINOPHEN 7.5; 325 MG/1; MG/1
1 TABLET ORAL 4 TIMES DAILY PRN
Qty: 120 TABLET | Refills: 0 | Status: SHIPPED | OUTPATIENT
Start: 2021-03-01 | End: 2021-03-22 | Stop reason: SDUPTHER

## 2021-03-09 ENCOUNTER — TELEPHONE (OUTPATIENT)
Dept: PAIN MEDICINE | Facility: CLINIC | Age: 81
End: 2021-03-09

## 2021-03-09 NOTE — TELEPHONE ENCOUNTER
She needs UDS. No refill until I have UDS results. She needs to have UDS at least a week before needing refill. Please arrange. She can f/u with phone visit if UDS is done.

## 2021-03-09 NOTE — TELEPHONE ENCOUNTER
Provider: ЮЛИЯ LEMUS  Caller: RADHA CARRERA  Relationship to Patient: SELF    Phone Number: [phone]    Reason for Call: PATIENT HAS TO CANCEL APPT FOR 03/10/2021- NOT AVAIL FOR RESCHEDULE IS SHOWING 04/14/2021- PATIENT WILL NEED RX REFILL BEFORE NEXT AVAILABLE DATE- NEEDS TO KNOW IF SHE WILL BE ABLE TO GET RX REFILLED WHEN DUE IF NOT ABLE TO MAKE APPT

## 2021-03-10 ENCOUNTER — APPOINTMENT (OUTPATIENT)
Dept: PAIN MEDICINE | Facility: CLINIC | Age: 81
End: 2021-03-10

## 2021-03-22 ENCOUNTER — OFFICE VISIT (OUTPATIENT)
Dept: PAIN MEDICINE | Facility: CLINIC | Age: 81
End: 2021-03-22

## 2021-03-22 VITALS
TEMPERATURE: 97.7 F | WEIGHT: 180 LBS | HEART RATE: 100 BPM | BODY MASS INDEX: 30.73 KG/M2 | DIASTOLIC BLOOD PRESSURE: 99 MMHG | HEIGHT: 64 IN | OXYGEN SATURATION: 95 % | RESPIRATION RATE: 16 BRPM | SYSTOLIC BLOOD PRESSURE: 152 MMHG

## 2021-03-22 DIAGNOSIS — G89.4 CHRONIC PAIN SYNDROME: Primary | ICD-10-CM

## 2021-03-22 DIAGNOSIS — M48.062 LUMBAR STENOSIS WITH NEUROGENIC CLAUDICATION: ICD-10-CM

## 2021-03-22 DIAGNOSIS — M54.16 LUMBAR RADICULITIS: ICD-10-CM

## 2021-03-22 DIAGNOSIS — M96.1 POSTLAMINECTOMY SYNDROME OF LUMBAR REGION: ICD-10-CM

## 2021-03-22 DIAGNOSIS — G25.81 RESTLESS LEG: ICD-10-CM

## 2021-03-22 DIAGNOSIS — Z79.899 HIGH RISK MEDICATION USE: ICD-10-CM

## 2021-03-22 PROCEDURE — 99214 OFFICE O/P EST MOD 30 MIN: CPT | Performed by: ANESTHESIOLOGY

## 2021-03-22 RX ORDER — FUROSEMIDE 40 MG/1
40 TABLET ORAL 2 TIMES DAILY
COMMUNITY
Start: 2021-03-13 | End: 2021-11-16 | Stop reason: HOSPADM

## 2021-03-22 RX ORDER — HYDROCODONE BITARTRATE AND ACETAMINOPHEN 7.5; 325 MG/1; MG/1
1 TABLET ORAL 4 TIMES DAILY PRN
Qty: 120 TABLET | Refills: 0 | Status: SHIPPED | OUTPATIENT
Start: 2021-03-31 | End: 2021-05-18 | Stop reason: SDUPTHER

## 2021-03-22 RX ORDER — HYDROCODONE BITARTRATE AND ACETAMINOPHEN 7.5; 325 MG/1; MG/1
1 TABLET ORAL 4 TIMES DAILY PRN
Qty: 120 TABLET | Refills: 0 | Status: SHIPPED | OUTPATIENT
Start: 2021-04-30 | End: 2021-03-31 | Stop reason: SDUPTHER

## 2021-03-22 RX ORDER — SILDENAFIL CITRATE 20 MG/1
20 TABLET ORAL 2 TIMES DAILY
Status: ON HOLD | COMMUNITY
Start: 2021-02-20 | End: 2022-03-10 | Stop reason: SDUPTHER

## 2021-03-22 NOTE — PROGRESS NOTES
CC back pain, jonah leg pain  80-year-old female with HTN, depression, chronic polyarthralgia from osteoarthritis, chronic back pain S/p L4-5 laminectomy with bony fusion 2018/Noelle., here for follow-up by tel.  Continued chronic back pain bilateral lower extremity radicular pain and neurogenic claudication symptoms.  Denies saddle anesthesia, bladder or bowel incontinence.  Chronic polyarthralgia / left knee pain/ bilateral feet neuropathic pain    Still  limited in ADL due to bilateral lower extremity pain and weakness.  Ambulating with walker    Utilizes gabapentin, hydrocodone with good relief functional benefit without side effects.      L-Spine x-ray 2019 Degenerative changes of the lumbar spine with stable 6 mm anterolisthesis L4 upon L5  L-spine MRI 2019: postoperative changes are noted at L4 and L5 with laminectomies and posterior bony fusion. Degenerative changes are seen at multiple levels, greatest at L3-4, with moderate to severe spinal canal narrowing and moderate bilateral foraminal narrowing, greater on the left    C-spine CT 2017:  Degenerative changes spondylosis with central stenosis at C5-C6 and left foraminal stenosis C4/5    Pain Assessment   Location of Pain: Lower Back, R Hip, L Hip, R Leg, L Leg  Description of Pain: Dull/Aching, Throbbing, Stabbing  Previous Pain Rating : 8  Current Pain Ratin  Aggravating Factors: Activity  Alleviating Factors: Rest, Medication  Previous Treatments: Nerve Blocks/Injections, Epidural Steroids, Narcotic Pain Medication, Physical Therapy  Previous Diagnostic Studies: X-Ray, MRI    PEG Assessment   What number best describes your pain on average in the past week?10  What number best describes how, during the past week, pain has interfered with your enjoyment of life?8  What number best describes how, during the past week, pain has interfered with your general activity? 9     has a past medical history of Anxiety, Arthritis, Atrial fibrillation (CMS/HCC),  "Broken shoulder, Buttock pain, DDD (degenerative disc disease), lumbar, Depression, Edema, GERD (gastroesophageal reflux disease), H/O fall, Hip pain, Hypertension, Hypothyroidism, Insomnia, Leg pain, Low back pain, Neuropathy, PONV (postoperative nausea and vomiting), Pulmonary hypertension (CMS/HCC), Radiculopathy, Restless legs, Spondylolisthesis, and Urinary incontinence.     has a past surgical history that includes Hysterectomy; Rotator cuff repair (Left); Cholecystectomy; Colonoscopy; and Back surgery (07/19/2018).  .  Social History     Tobacco Use   • Smoking status: Never Smoker   • Smokeless tobacco: Never Used   Substance Use Topics   • Alcohol use: No       Review of Systems        See HPI      Vitals:    03/22/21 1423   BP: 152/99   Pulse: 100   Resp: 16   Temp: 97.7 °F (36.5 °C)   SpO2: 95%   Weight: 81.6 kg (180 lb)   Height: 162.6 cm (64\")     Physical Exam  Constitutional:       General: She is not in acute distress.     Appearance: She is obese.   Pulmonary:      Effort: Pulmonary effort is normal.   Musculoskeletal:      Lumbar back: Tenderness present. Decreased range of motion. Positive right straight leg raise test and positive left straight leg raise test.   Psychiatric:         Behavior: Behavior normal.         PHQ9 on chart                    opioid risk tool high risk        Assessment/Plan  Diagnoses and all orders for this visit:    1. Chronic pain syndrome (Primary)  -     HYDROcodone-acetaminophen (NORCO) 7.5-325 MG per tablet; Take 1 tablet by mouth 4 (Four) Times a Day As Needed for Severe Pain . DNF before 3/31/2021  Dispense: 120 tablet; Refill: 0  -     HYDROcodone-acetaminophen (NORCO) 7.5-325 MG per tablet; Take 1 tablet by mouth 4 (Four) Times a Day As Needed for Severe Pain . DNF before 4/30/2021  Dispense: 120 tablet; Refill: 0    2. Postlaminectomy syndrome of lumbar region    3. Lumbar radiculitis    4. Lumbar stenosis with neurogenic claudication    5. Restless leg    6. " High risk medication use  -     Urine Drug Screen - Urine, Clean Catch; Future    Summary:   80-year-old female with HTN, depression, chronic polyarthralgia from osteoarthritis, chronic back pain from DDD/ spondylosis/stenosis, S/p L4/5 laminectomy with bony fusion 7/2018/ Majd seen in  follow-up.     Chronic lower lumbar /coccyx pain and RLE radicular pain, continued bilateral lower extremity weakness.    Repeat caudal STEPHAN as needed    Continue hydrocodone 7.5/325 4 times daily as needed for severe pain.  UDS and Inspect reviewed.   Discussed risk of tolerance, dependence, respiratory depression, coma and death associated with use of oral opioids for treatment of chronic nonmalignant pain.      Cont Lidocaine patch for worsening myofascial and neuropathic pain.   Cont requip for RLS at night    RTC  in  2 mo.

## 2021-03-31 ENCOUNTER — APPOINTMENT (OUTPATIENT)
Dept: GENERAL RADIOLOGY | Facility: HOSPITAL | Age: 81
End: 2021-03-31

## 2021-03-31 ENCOUNTER — HOSPITAL ENCOUNTER (INPATIENT)
Facility: HOSPITAL | Age: 81
LOS: 3 days | Discharge: HOME | End: 2021-04-03
Attending: EMERGENCY MEDICINE | Admitting: INTERNAL MEDICINE

## 2021-03-31 DIAGNOSIS — R94.39 ABNORMAL STRESS TEST: ICD-10-CM

## 2021-03-31 DIAGNOSIS — R07.9 CHEST PAIN, UNSPECIFIED TYPE: Primary | ICD-10-CM

## 2021-03-31 DIAGNOSIS — I20.0 UNSTABLE ANGINA PECTORIS (HCC): ICD-10-CM

## 2021-03-31 LAB
ALBUMIN SERPL-MCNC: 4.5 G/DL (ref 3.5–5.2)
ALBUMIN/GLOB SERPL: 1.3 G/DL
ALP SERPL-CCNC: 102 U/L (ref 39–117)
ALT SERPL W P-5'-P-CCNC: 12 U/L (ref 1–33)
ANION GAP SERPL CALCULATED.3IONS-SCNC: 13 MMOL/L (ref 5–15)
AST SERPL-CCNC: 22 U/L (ref 1–32)
BASOPHILS # BLD AUTO: 0.1 10*3/MM3 (ref 0–0.2)
BASOPHILS NFR BLD AUTO: 1.1 % (ref 0–1.5)
BILIRUB SERPL-MCNC: 0.7 MG/DL (ref 0–1.2)
BUN SERPL-MCNC: 11 MG/DL (ref 8–23)
BUN/CREAT SERPL: 11.5 (ref 7–25)
CALCIUM SPEC-SCNC: 9.5 MG/DL (ref 8.6–10.5)
CHLORIDE SERPL-SCNC: 101 MMOL/L (ref 98–107)
CO2 SERPL-SCNC: 28 MMOL/L (ref 22–29)
CREAT SERPL-MCNC: 0.96 MG/DL (ref 0.57–1)
DEPRECATED RDW RBC AUTO: 45.1 FL (ref 37–54)
EOSINOPHIL # BLD AUTO: 0.1 10*3/MM3 (ref 0–0.4)
EOSINOPHIL NFR BLD AUTO: 1.4 % (ref 0.3–6.2)
ERYTHROCYTE [DISTWIDTH] IN BLOOD BY AUTOMATED COUNT: 15 % (ref 12.3–15.4)
GFR SERPL CREATININE-BSD FRML MDRD: 56 ML/MIN/1.73
GLOBULIN UR ELPH-MCNC: 3.6 GM/DL
GLUCOSE SERPL-MCNC: 122 MG/DL (ref 65–99)
HCT VFR BLD AUTO: 35.9 % (ref 34–46.6)
HGB BLD-MCNC: 11.8 G/DL (ref 12–15.9)
LYMPHOCYTES # BLD AUTO: 2 10*3/MM3 (ref 0.7–3.1)
LYMPHOCYTES NFR BLD AUTO: 22.9 % (ref 19.6–45.3)
MCH RBC QN AUTO: 28.6 PG (ref 26.6–33)
MCHC RBC AUTO-ENTMCNC: 32.8 G/DL (ref 31.5–35.7)
MCV RBC AUTO: 87.2 FL (ref 79–97)
MONOCYTES # BLD AUTO: 0.9 10*3/MM3 (ref 0.1–0.9)
MONOCYTES NFR BLD AUTO: 10.4 % (ref 5–12)
NEUTROPHILS NFR BLD AUTO: 5.5 10*3/MM3 (ref 1.7–7)
NEUTROPHILS NFR BLD AUTO: 64.2 % (ref 42.7–76)
NRBC BLD AUTO-RTO: 0 /100 WBC (ref 0–0.2)
NT-PROBNP SERPL-MCNC: 695.9 PG/ML (ref 0–1800)
PLATELET # BLD AUTO: 295 10*3/MM3 (ref 140–450)
PMV BLD AUTO: 7.3 FL (ref 6–12)
POTASSIUM SERPL-SCNC: 3.2 MMOL/L (ref 3.5–5.2)
PROT SERPL-MCNC: 8.1 G/DL (ref 6–8.5)
RBC # BLD AUTO: 4.12 10*6/MM3 (ref 3.77–5.28)
SODIUM SERPL-SCNC: 142 MMOL/L (ref 136–145)
TROPONIN T SERPL-MCNC: <0.01 NG/ML (ref 0–0.03)
WBC # BLD AUTO: 8.6 10*3/MM3 (ref 3.4–10.8)
WHOLE BLOOD HOLD SPECIMEN: NORMAL

## 2021-03-31 PROCEDURE — 84484 ASSAY OF TROPONIN QUANT: CPT | Performed by: EMERGENCY MEDICINE

## 2021-03-31 PROCEDURE — G0378 HOSPITAL OBSERVATION PER HR: HCPCS

## 2021-03-31 PROCEDURE — 93005 ELECTROCARDIOGRAM TRACING: CPT

## 2021-03-31 PROCEDURE — 80053 COMPREHEN METABOLIC PANEL: CPT | Performed by: EMERGENCY MEDICINE

## 2021-03-31 PROCEDURE — 71045 X-RAY EXAM CHEST 1 VIEW: CPT

## 2021-03-31 PROCEDURE — 99284 EMERGENCY DEPT VISIT MOD MDM: CPT

## 2021-03-31 PROCEDURE — 83880 ASSAY OF NATRIURETIC PEPTIDE: CPT | Performed by: EMERGENCY MEDICINE

## 2021-03-31 PROCEDURE — 99219 PR INITIAL OBSERVATION CARE/DAY 50 MINUTES: CPT | Performed by: NURSE PRACTITIONER

## 2021-03-31 PROCEDURE — 85025 COMPLETE CBC W/AUTO DIFF WBC: CPT | Performed by: EMERGENCY MEDICINE

## 2021-03-31 RX ORDER — PANTOPRAZOLE SODIUM 40 MG/1
40 TABLET, DELAYED RELEASE ORAL DAILY
Status: DISCONTINUED | OUTPATIENT
Start: 2021-04-01 | End: 2021-04-03 | Stop reason: HOSPADM

## 2021-03-31 RX ORDER — SODIUM CHLORIDE 0.9 % (FLUSH) 0.9 %
10 SYRINGE (ML) INJECTION AS NEEDED
Status: DISCONTINUED | OUTPATIENT
Start: 2021-03-31 | End: 2021-04-03 | Stop reason: HOSPADM

## 2021-03-31 RX ORDER — GABAPENTIN 300 MG/1
600 CAPSULE ORAL 4 TIMES DAILY
Status: DISCONTINUED | OUTPATIENT
Start: 2021-03-31 | End: 2021-04-03 | Stop reason: HOSPADM

## 2021-03-31 RX ORDER — ACETAMINOPHEN 325 MG/1
650 TABLET ORAL EVERY 4 HOURS PRN
Status: DISCONTINUED | OUTPATIENT
Start: 2021-03-31 | End: 2021-04-03 | Stop reason: HOSPADM

## 2021-03-31 RX ORDER — CYCLOBENZAPRINE HCL 10 MG
10 TABLET ORAL 2 TIMES DAILY PRN
COMMUNITY
End: 2021-04-03 | Stop reason: HOSPADM

## 2021-03-31 RX ORDER — ACETAMINOPHEN 160 MG/5ML
650 SOLUTION ORAL EVERY 4 HOURS PRN
Status: DISCONTINUED | OUTPATIENT
Start: 2021-03-31 | End: 2021-04-03 | Stop reason: HOSPADM

## 2021-03-31 RX ORDER — SODIUM CHLORIDE 0.9 % (FLUSH) 0.9 %
10 SYRINGE (ML) INJECTION EVERY 12 HOURS SCHEDULED
Status: DISCONTINUED | OUTPATIENT
Start: 2021-03-31 | End: 2021-04-03 | Stop reason: HOSPADM

## 2021-03-31 RX ORDER — TRAZODONE HYDROCHLORIDE 50 MG/1
25 TABLET ORAL NIGHTLY
Status: DISCONTINUED | OUTPATIENT
Start: 2021-03-31 | End: 2021-04-03 | Stop reason: HOSPADM

## 2021-03-31 RX ORDER — ONDANSETRON 2 MG/ML
4 INJECTION INTRAMUSCULAR; INTRAVENOUS EVERY 6 HOURS PRN
Status: DISCONTINUED | OUTPATIENT
Start: 2021-03-31 | End: 2021-04-02 | Stop reason: SDUPTHER

## 2021-03-31 RX ORDER — CYCLOBENZAPRINE HCL 10 MG
10 TABLET ORAL 2 TIMES DAILY PRN
Status: DISCONTINUED | OUTPATIENT
Start: 2021-03-31 | End: 2021-04-03 | Stop reason: HOSPADM

## 2021-03-31 RX ORDER — SILDENAFIL CITRATE 20 MG/1
20 TABLET ORAL 2 TIMES DAILY
Status: DISCONTINUED | OUTPATIENT
Start: 2021-03-31 | End: 2021-04-03 | Stop reason: HOSPADM

## 2021-03-31 RX ORDER — ROPINIROLE 0.25 MG/1
0.25 TABLET, FILM COATED ORAL NIGHTLY
Status: DISCONTINUED | OUTPATIENT
Start: 2021-03-31 | End: 2021-04-03 | Stop reason: HOSPADM

## 2021-03-31 RX ORDER — HYDROCODONE BITARTRATE AND ACETAMINOPHEN 7.5; 325 MG/1; MG/1
1 TABLET ORAL 4 TIMES DAILY PRN
Status: DISCONTINUED | OUTPATIENT
Start: 2021-03-31 | End: 2021-04-03 | Stop reason: HOSPADM

## 2021-03-31 RX ORDER — POTASSIUM CHLORIDE 20 MEQ/1
40 TABLET, EXTENDED RELEASE ORAL ONCE
Status: COMPLETED | OUTPATIENT
Start: 2021-03-31 | End: 2021-03-31

## 2021-03-31 RX ORDER — METOPROLOL TARTRATE 5 MG/5ML
5 INJECTION INTRAVENOUS ONCE
Status: COMPLETED | OUTPATIENT
Start: 2021-03-31 | End: 2021-03-31

## 2021-03-31 RX ORDER — LEVOTHYROXINE SODIUM 0.07 MG/1
75 TABLET ORAL DAILY
Status: DISCONTINUED | OUTPATIENT
Start: 2021-04-01 | End: 2021-04-03 | Stop reason: HOSPADM

## 2021-03-31 RX ORDER — ACETAMINOPHEN 650 MG/1
650 SUPPOSITORY RECTAL EVERY 4 HOURS PRN
Status: DISCONTINUED | OUTPATIENT
Start: 2021-03-31 | End: 2021-04-03 | Stop reason: HOSPADM

## 2021-03-31 RX ORDER — FAMOTIDINE 20 MG/1
20 TABLET, FILM COATED ORAL DAILY
Status: DISCONTINUED | OUTPATIENT
Start: 2021-04-01 | End: 2021-04-03 | Stop reason: HOSPADM

## 2021-03-31 RX ORDER — METOPROLOL SUCCINATE 50 MG/1
50 TABLET, EXTENDED RELEASE ORAL DAILY
Status: DISCONTINUED | OUTPATIENT
Start: 2021-04-01 | End: 2021-04-03 | Stop reason: HOSPADM

## 2021-03-31 RX ADMIN — GABAPENTIN 600 MG: 300 CAPSULE ORAL at 23:48

## 2021-03-31 RX ADMIN — SILDENAFIL 20 MG: 20 TABLET, FILM COATED ORAL at 23:49

## 2021-03-31 RX ADMIN — ROPINIROLE 0.25 MG: 0.25 TABLET, FILM COATED ORAL at 23:49

## 2021-03-31 RX ADMIN — Medication 10 ML: at 23:54

## 2021-03-31 RX ADMIN — Medication 10 ML: at 20:48

## 2021-03-31 RX ADMIN — HYDROCODONE BITARTRATE AND ACETAMINOPHEN 1 TABLET: 7.5; 325 TABLET ORAL at 23:48

## 2021-03-31 RX ADMIN — SERTRALINE 50 MG: 50 TABLET, FILM COATED ORAL at 23:49

## 2021-03-31 RX ADMIN — APIXABAN 5 MG: 5 TABLET, FILM COATED ORAL at 23:49

## 2021-03-31 RX ADMIN — TRAZODONE HYDROCHLORIDE 25 MG: 50 TABLET ORAL at 23:48

## 2021-03-31 RX ADMIN — NITROGLYCERIN 1 INCH: 20 OINTMENT TOPICAL at 20:05

## 2021-03-31 RX ADMIN — POTASSIUM CHLORIDE 40 MEQ: 1500 TABLET, EXTENDED RELEASE ORAL at 20:23

## 2021-03-31 RX ADMIN — METOPROLOL TARTRATE 5 MG: 5 INJECTION INTRAVENOUS at 19:17

## 2021-04-01 ENCOUNTER — APPOINTMENT (OUTPATIENT)
Dept: NUCLEAR MEDICINE | Facility: HOSPITAL | Age: 81
End: 2021-04-01

## 2021-04-01 PROBLEM — R94.39 ABNORMAL STRESS TEST: Status: ACTIVE | Noted: 2021-04-01

## 2021-04-01 PROBLEM — I10 ESSENTIAL HYPERTENSION: Chronic | Status: ACTIVE | Noted: 2017-05-04

## 2021-04-01 LAB
ANION GAP SERPL CALCULATED.3IONS-SCNC: 9 MMOL/L (ref 5–15)
BH CV REST NUCLEAR ISOTOPE DOSE: 7.9 MCI
BH CV STRESS BP STAGE 1: NORMAL
BH CV STRESS BP STAGE 2: NORMAL
BH CV STRESS BP STAGE 3: NORMAL
BH CV STRESS COMMENTS STAGE 1: NORMAL
BH CV STRESS COMMENTS STAGE 2: NORMAL
BH CV STRESS DOSE REGADENOSON STAGE 1: 0.4
BH CV STRESS DURATION MIN STAGE 1: 0
BH CV STRESS DURATION MIN STAGE 2: 4
BH CV STRESS DURATION SEC STAGE 1: 10
BH CV STRESS DURATION SEC STAGE 2: 0
BH CV STRESS HR STAGE 1: 134
BH CV STRESS HR STAGE 2: 120
BH CV STRESS HR STAGE 3: 103
BH CV STRESS NUCLEAR ISOTOPE DOSE: 21 MCI
BH CV STRESS PROTOCOL 1: NORMAL
BH CV STRESS RECOVERY BP: NORMAL MMHG
BH CV STRESS RECOVERY HR: 100 BPM
BH CV STRESS STAGE 1: 1
BH CV STRESS STAGE 2: 2
BH CV STRESS STAGE 3: 3
BUN SERPL-MCNC: 12 MG/DL (ref 8–23)
BUN/CREAT SERPL: 13 (ref 7–25)
CALCIUM SPEC-SCNC: 9.3 MG/DL (ref 8.6–10.5)
CHLORIDE SERPL-SCNC: 103 MMOL/L (ref 98–107)
CO2 SERPL-SCNC: 30 MMOL/L (ref 22–29)
CREAT SERPL-MCNC: 0.92 MG/DL (ref 0.57–1)
DEPRECATED RDW RBC AUTO: 48.1 FL (ref 37–54)
ERYTHROCYTE [DISTWIDTH] IN BLOOD BY AUTOMATED COUNT: 15.7 % (ref 12.3–15.4)
GFR SERPL CREATININE-BSD FRML MDRD: 59 ML/MIN/1.73
GLUCOSE SERPL-MCNC: 108 MG/DL (ref 65–99)
HCT VFR BLD AUTO: 35.2 % (ref 34–46.6)
HGB BLD-MCNC: 11.7 G/DL (ref 12–15.9)
LV EF NUC BP: 76 %
MAXIMAL PREDICTED HEART RATE: 141 BPM
MCH RBC QN AUTO: 28.9 PG (ref 26.6–33)
MCHC RBC AUTO-ENTMCNC: 33.4 G/DL (ref 31.5–35.7)
MCV RBC AUTO: 86.6 FL (ref 79–97)
PERCENT MAX PREDICTED HR: 95.04 %
PLATELET # BLD AUTO: 292 10*3/MM3 (ref 140–450)
PMV BLD AUTO: 7.6 FL (ref 6–12)
POTASSIUM SERPL-SCNC: 3.9 MMOL/L (ref 3.5–5.2)
QT INTERVAL: 353 MS
RBC # BLD AUTO: 4.06 10*6/MM3 (ref 3.77–5.28)
SARS-COV-2 RNA PNL SPEC NAA+PROBE: NOT DETECTED
SODIUM SERPL-SCNC: 142 MMOL/L (ref 136–145)
STRESS BASELINE BP: NORMAL MMHG
STRESS BASELINE HR: 99 BPM
STRESS PERCENT HR: 112 %
STRESS POST PEAK BP: NORMAL MMHG
STRESS POST PEAK HR: 134 BPM
STRESS TARGET HR: 120 BPM
TROPONIN T SERPL-MCNC: <0.01 NG/ML (ref 0–0.03)
WBC # BLD AUTO: 7.5 10*3/MM3 (ref 3.4–10.8)

## 2021-04-01 PROCEDURE — 84484 ASSAY OF TROPONIN QUANT: CPT | Performed by: NURSE PRACTITIONER

## 2021-04-01 PROCEDURE — 78452 HT MUSCLE IMAGE SPECT MULT: CPT | Performed by: INTERNAL MEDICINE

## 2021-04-01 PROCEDURE — U0003 INFECTIOUS AGENT DETECTION BY NUCLEIC ACID (DNA OR RNA); SEVERE ACUTE RESPIRATORY SYNDROME CORONAVIRUS 2 (SARS-COV-2) (CORONAVIRUS DISEASE [COVID-19]), AMPLIFIED PROBE TECHNIQUE, MAKING USE OF HIGH THROUGHPUT TECHNOLOGIES AS DESCRIBED BY CMS-2020-01-R: HCPCS | Performed by: INTERNAL MEDICINE

## 2021-04-01 PROCEDURE — 0 TECHNETIUM SESTAMIBI: Performed by: INTERNAL MEDICINE

## 2021-04-01 PROCEDURE — 99233 SBSQ HOSP IP/OBS HIGH 50: CPT | Performed by: INTERNAL MEDICINE

## 2021-04-01 PROCEDURE — 80048 BASIC METABOLIC PNL TOTAL CA: CPT | Performed by: INTERNAL MEDICINE

## 2021-04-01 PROCEDURE — 93005 ELECTROCARDIOGRAM TRACING: CPT | Performed by: INTERNAL MEDICINE

## 2021-04-01 PROCEDURE — 85027 COMPLETE CBC AUTOMATED: CPT | Performed by: INTERNAL MEDICINE

## 2021-04-01 PROCEDURE — 78452 HT MUSCLE IMAGE SPECT MULT: CPT

## 2021-04-01 PROCEDURE — 25010000002 REGADENOSON 0.4 MG/5ML SOLUTION: Performed by: INTERNAL MEDICINE

## 2021-04-01 PROCEDURE — A9500 TC99M SESTAMIBI: HCPCS | Performed by: INTERNAL MEDICINE

## 2021-04-01 PROCEDURE — 97161 PT EVAL LOW COMPLEX 20 MIN: CPT

## 2021-04-01 PROCEDURE — 93017 CV STRESS TEST TRACING ONLY: CPT

## 2021-04-01 PROCEDURE — 93018 CV STRESS TEST I&R ONLY: CPT | Performed by: NURSE PRACTITIONER

## 2021-04-01 PROCEDURE — 93010 ELECTROCARDIOGRAM REPORT: CPT | Performed by: INTERNAL MEDICINE

## 2021-04-01 PROCEDURE — 99222 1ST HOSP IP/OBS MODERATE 55: CPT | Performed by: INTERNAL MEDICINE

## 2021-04-01 RX ORDER — POTASSIUM CHLORIDE 1.5 G/1.77G
40 POWDER, FOR SOLUTION ORAL AS NEEDED
Status: DISCONTINUED | OUTPATIENT
Start: 2021-04-01 | End: 2021-04-03 | Stop reason: HOSPADM

## 2021-04-01 RX ORDER — ALPRAZOLAM 0.25 MG/1
0.25 TABLET ORAL ONCE
Status: CANCELLED | OUTPATIENT
Start: 2021-04-01 | End: 2021-04-01

## 2021-04-01 RX ORDER — POTASSIUM CHLORIDE 20 MEQ/1
40 TABLET, EXTENDED RELEASE ORAL AS NEEDED
Status: DISCONTINUED | OUTPATIENT
Start: 2021-04-01 | End: 2021-04-03 | Stop reason: HOSPADM

## 2021-04-01 RX ORDER — DIPHENHYDRAMINE HCL 25 MG
25 CAPSULE ORAL ONCE
Status: CANCELLED | OUTPATIENT
Start: 2021-04-01 | End: 2021-04-01

## 2021-04-01 RX ADMIN — HYDROCODONE BITARTRATE AND ACETAMINOPHEN 1 TABLET: 7.5; 325 TABLET ORAL at 05:51

## 2021-04-01 RX ADMIN — REGADENOSON 0.4 MG: 0.08 INJECTION, SOLUTION INTRAVENOUS at 09:35

## 2021-04-01 RX ADMIN — SILDENAFIL 20 MG: 20 TABLET, FILM COATED ORAL at 20:10

## 2021-04-01 RX ADMIN — GABAPENTIN 600 MG: 300 CAPSULE ORAL at 17:42

## 2021-04-01 RX ADMIN — TECHNETIUM TC 99M SESTAMIBI 1 DOSE: 1 INJECTION INTRAVENOUS at 09:35

## 2021-04-01 RX ADMIN — GABAPENTIN 600 MG: 300 CAPSULE ORAL at 10:59

## 2021-04-01 RX ADMIN — METOPROLOL SUCCINATE 50 MG: 50 TABLET, EXTENDED RELEASE ORAL at 10:59

## 2021-04-01 RX ADMIN — SILDENAFIL 20 MG: 20 TABLET, FILM COATED ORAL at 10:59

## 2021-04-01 RX ADMIN — HYDROCODONE BITARTRATE AND ACETAMINOPHEN 1 TABLET: 7.5; 325 TABLET ORAL at 21:23

## 2021-04-01 RX ADMIN — CYCLOBENZAPRINE HYDROCHLORIDE 10 MG: 10 TABLET, FILM COATED ORAL at 00:38

## 2021-04-01 RX ADMIN — GABAPENTIN 600 MG: 300 CAPSULE ORAL at 12:16

## 2021-04-01 RX ADMIN — TRAZODONE HYDROCHLORIDE 25 MG: 50 TABLET ORAL at 20:10

## 2021-04-01 RX ADMIN — TECHNETIUM TC 99M SESTAMIBI 1 DOSE: 1 INJECTION INTRAVENOUS at 07:32

## 2021-04-01 RX ADMIN — PANTOPRAZOLE SODIUM 40 MG: 40 TABLET, DELAYED RELEASE ORAL at 10:59

## 2021-04-01 RX ADMIN — HYDROCODONE BITARTRATE AND ACETAMINOPHEN 1 TABLET: 7.5; 325 TABLET ORAL at 17:42

## 2021-04-01 RX ADMIN — SERTRALINE 50 MG: 50 TABLET, FILM COATED ORAL at 20:10

## 2021-04-01 RX ADMIN — GABAPENTIN 600 MG: 300 CAPSULE ORAL at 20:10

## 2021-04-01 RX ADMIN — Medication 10 ML: at 20:10

## 2021-04-01 RX ADMIN — FAMOTIDINE 20 MG: 20 TABLET, FILM COATED ORAL at 10:58

## 2021-04-01 RX ADMIN — LEVOTHYROXINE SODIUM 75 MCG: 0.07 TABLET ORAL at 10:59

## 2021-04-01 RX ADMIN — Medication 10 ML: at 11:00

## 2021-04-01 RX ADMIN — CYCLOBENZAPRINE HYDROCHLORIDE 10 MG: 10 TABLET, FILM COATED ORAL at 14:49

## 2021-04-01 RX ADMIN — ROPINIROLE 0.25 MG: 0.25 TABLET, FILM COATED ORAL at 20:10

## 2021-04-01 NOTE — THERAPY EVALUATION
Patient Name: Catalina Moreno  : 1940    MRN: [id number]                              Today's Date: 2021       Admit Date: 3/31/2021    Visit Dx:     ICD-10-CM ICD-9-CM   1. Chest pain, unspecified type  R07.9 786.50     Patient Active Problem List   Diagnosis   • Arthritis   • Neuropathic pain   • Other long term (current) drug therapy   • Polyarthralgia   • Sacroiliitis (CMS/HCC)   • Depressive disorder   • Primary fibromyalgia syndrome   • Gastroesophageal reflux disease   • Hypertension   • Lightheadedness   • Hypothyroidism   • Lumbar radiculopathy   • Altered mental status   • Acute on chronic diastolic congestive heart failure (CMS/HCC)   • Nonrheumatic tricuspid valve regurgitation   • Atrial fibrillation (CMS/HCC)   • Pulmonary hypertension (CMS/HCC)   • Acquired spondylolisthesis of lumbosacral region   • Spondylolisthesis, lumbar region   • Vitamin D deficiency   • Stage 2 chronic kidney disease   • DDD (degenerative disc disease), cervical   • Chronic pain   • Chronic low back pain   • Cervical stenosis of spinal canal   • Anemia   • Chest pain     Past Medical History:   Diagnosis Date   • Anxiety    • Arthritis    • Atrial fibrillation (CMS/HCC)     paroxysmal   • Broken shoulder     left-- due to fall 20 was at [facility]   • Buttock pain     rt side   • DDD (degenerative disc disease), lumbar    • Depression    • Edema     2020 foot   • GERD (gastroesophageal reflux disease)    • H/O fall    • Hip pain     rt   • Hypertension    • Hypothyroidism    • Insomnia    • Leg pain     rt   • Low back pain    • Neuropathy    • PONV (postoperative nausea and vomiting)    • Pulmonary hypertension (CMS/HCC)    • Radiculopathy    • Restless legs    • Spondylolisthesis    • Urinary incontinence      Past Surgical History:   Procedure Laterality Date   • BACK SURGERY  2018    PDC &  PSF L3-L5 insitu   • CHOLECYSTECTOMY     • COLONOSCOPY     • HYSTERECTOMY     • ROTATOR CUFF REPAIR Left       General Information     Row Name 04/01/21 1159          Physical Therapy Time and Intention    Document Type  evaluation  -CR     Mode of Treatment  physical therapy  -CR     Row Name 04/01/21 1159          General Information    Patient Profile Reviewed  yes  -CR     Prior Level of Function  independent:;all household mobility;ADL's;home management using rw  -CR     Row Name 04/01/21 1159          Home Main Entrance    Number of Stairs, Main Entrance  one  -CR     Row Name 04/01/21 1159          Stairs Within Home, Primary    Number of Stairs, Within Home, Primary  none  -CR     Row Name 04/01/21 1159          Cognition    Orientation Status (Cognition)  oriented x 3  -CR     Row Name 04/01/21 1159          Safety Issues, Functional Mobility    Impairments Affecting Function (Mobility)  endurance/activity tolerance  -CR       User Key  (r) = Recorded By, (t) = Taken By, (c) = Cosigned By    Initials Name Provider Type    CR Reyes, Carmela, DEBBI Physical Therapist        Mobility     Row Name 04/01/21 1200          Bed Mobility    Bed Mobility  supine-sit-supine  -CR     Supine-Sit-Supine [place] (Bed Mobility)  modified independence  -CR     Assistive Device (Bed Mobility)  bed rails  -CR     Row Name 04/01/21 1200          Bed-Chair Transfer    Bed-Chair [place] (Transfers)  standby assist  -CR     Assistive Device (Bed-Chair Transfers)  walker, front-wheeled  -CR     Row Name 04/01/21 1200          Sit-Stand Transfer    Sit-Stand [place] (Transfers)  standby assist  -CR     Assistive Device (Sit-Stand Transfers)  walker, front-wheeled  -CR     Row Name 04/01/21 1200          Gait/Stairs (Locomotion)    [place] Level (Gait)  standby assist  -CR     Assistive Device (Gait)  walker, front-wheeled  -CR     Distance in Feet (Gait)  40  -CR     Deviations/Abnormal Patterns (Gait)  gait speed decreased  -CR     Bilateral Gait Deviations  forward flexed posture  -CR       User Key  (r) = Recorded  By, (t) = Taken By, (c) = Cosigned By    Initials Name Provider Type    CR Reyes, Carmela, PT Physical Therapist        Obj/Interventions     Row Name 04/01/21 1201          Range of Motion Comprehensive    General Range of Motion  no range of motion deficits identified  -CR     Row Name 04/01/21 1201          Strength Comprehensive (MMT)    General Manual Muscle Testing (MMT) Assessment  no strength deficits identified  -CR     Row Name 04/01/21 1201          Balance    Balance Assessment  sitting static balance;sitting dynamic balance;standing static balance;standing dynamic balance  -CR     Static Sitting Balance  WNL;sitting, edge of bed  -CR     Dynamic Sitting Balance  WFL;sitting, edge of bed  -CR     Static Standing Balance  WFL  -CR     Dynamic Standing Balance  WFL  -CR     Row Name 04/01/21 1201          Sensory Assessment (Somatosensory)    Sensory Assessment (Somatosensory)  sensation intact  -CR       User Key  (r) = Recorded By, (t) = Taken By, (c) = Cosigned By    Initials Name Provider Type    CR Reyes, Carmela, PT Physical Therapist        Goals/Plan    No documentation.       Clinical Impression     Row Name 04/01/21 1202          Pain    Additional Documentation  Pain Scale: Numbers Pre/Post-Treatment (Group)  -CR     Row Name 04/01/21 1202          Pain Scale: Numbers Pre/Post-Treatment    Pretreatment Pain Rating  0/10 - no pain  -CR     Posttreatment Pain Rating  0/10 - no pain  -CR     Row Name 04/01/21 1202          Plan of Care Review    Plan of Care Reviewed With  patient  -CR     Outcome Summary  81 y/o female came to hospital due to sudden onset of chest pain. PMH includes afib, anxiety, DDD, depression. Pt lives alone with a dog and does own household tasks. Pt reports lacking energy for few days but no exposure to anyone ill. No chest pain reported entire session even during ambulation using rw. Pt did not require any physical assistance with transfers. Patient is interested in  caregivers to assist with household tasks, HH can be beneficial as well for strength /endurance training at d.c.PPE gloves, mask with shield.  -CR     Row Name 04/01/21 1202          Therapy Assessment/Plan (PT)    Patient/Family Therapy Goals Statement (PT)  home  -CR     Row Name 04/01/21 1202          Positioning and Restraints    Pre-Treatment Position  in bed  -CR     Post Treatment Position  bed  -CR     In Bed  notified nsg;supine;call light within reach  -CR       User Key  (r) = Recorded By, (t) = Taken By, (c) = Cosigned By    Initials Name Provider Type    CR Reyes, Carmela, PT Physical Therapist        Outcome Measures     Row Name 04/01/21 1209          How much help from another person do you currently need...    Turning from your back to your side while in flat bed without using bedrails?  4  -CR     Moving from lying on back to sitting on the side of a flat bed without bedrails?  4  -CR     Moving to and from a bed to a chair (including a wheelchair)?  4  -CR     Standing up from a chair using your arms (e.g., wheelchair, bedside chair)?  4  -CR     Climbing 3-5 steps with a railing?  3  -CR     To walk in hospital room?  4  -CR     AM-PAC 6 Clicks Score (PT)  23  -CR     Row Name 04/01/21 1209          Functional Assessment    Outcome Measure Options  AM-PAC 6 Clicks Basic Mobility (PT)  -CR       User Key  (r) = Recorded By, (t) = Taken By, (c) = Cosigned By    Initials Name Provider Type    CR Reyes, Carmela, PT Physical Therapist        Physical Therapy Education                 Title: PT OT SLP Therapies (Resolved)     Topic: Physical Therapy (Resolved)     Point: Mobility training (Resolved)     Learning Progress Summary           Patient Acceptance, E, VU by CR at 4/1/2021 1209                               User Key     Initials Effective Dates Name Provider Type Discipline    CR 03/01/19 -  Reyes, Carmela, PT Physical Therapist PT              PT Recommendation and Plan     Plan of Care  Reviewed With: patient  Outcome Summary: 81 y/o female came to hospital due to sudden onset of chest pain. PMH includes afib, anxiety, DDD, depression. Pt lives alone with a dog and does own household tasks. Pt reports lacking energy for few days but no exposure to anyone ill. No chest pain reported entire session even during ambulation using rw. Pt did not require any physical assistance with transfers. Patient is interested in caregivers to assist with household tasks, HH can be beneficial as well for strength /endurance training at d.c.PPE gloves, mask with shield.     Time Calculation:   PT Charges     Row Name 04/01/21 1210             Time Calculation    Start Time  1114  -CR      Stop Time  1130  -CR      Time Calculation (min)  16 min  -CR      PT Received On  04/01/21  -CR         Time Calculation- PT    Total Timed Code Minutes- PT  0 minute(s)  -CR        User Key  (r) = Recorded By, (t) = Taken By, (c) = Cosigned By    Initials Name Provider Type    CR Reyes, Carmela, PT Physical Therapist        Therapy Charges for Today     Code Description Service Date Service Provider Modifiers Qty    40242284196 HC PT EVAL LOW COMPLEXITY 3 4/1/2021 Reyes, Carmela, PT GP 1          PT G-Codes  Outcome Measure Options: AM-PAC 6 Clicks Basic Mobility (PT)  AM-PAC 6 Clicks Score (PT): 23    Carmela Reyes, PT  4/1/2021

## 2021-04-01 NOTE — PROGRESS NOTES
Discharge Planning Assessment   Gabriel     Patient Name: Catalina Moreno  MRN: [id number]  Today's Date: 4/1/2021    Admit Date: 3/31/2021    Discharge Needs Assessment     Row Name 04/01/21 1035       Living Environment    Lives With  alone    Unique Family Situation  son checks on her often but he is an over the road  and is gone often.    Current Living Arrangements  home/apartment/condo    Primary Care Provided by  self    Family Caregiver if Needed  child(evangelista), adult    Quality of Family Relationships  helpful;supportive    Able to Return to Prior Arrangements  yes    Living Arrangement Comments  Pt live alone.  She continues drives herself.  Pt denies frankie homebound.       Resource/Environmental Concerns    Resource/Environmental Concerns  none       Transition Planning    Patient/Family Anticipates Transition to  home    Patient/Family Anticipated Services at Transition  none    Transportation Anticipated  family or friend will provide son is  and can provide transport home if not called into work.  Pt denies other friends or family to provide transport.       Discharge Needs Assessment    Readmission Within the Last 30 Days  no previous admission in last 30 days    Equipment Currently Used at Home  walker, rolling    Concerns to be Addressed  no discharge needs identified;denies needs/concerns at this time    Concerns Comments  Possible transport home.  SW is aware.    Anticipated Changes Related to Illness  none    Equipment Needed After Discharge  none    Discharge Coordination/Progress  CM gathered information per phone conversation with patient and chart review to determine possible discharge needs.  Pt states no difficulties affording medication.  PCP - Anayeli Costa.  Pharmacy used "[facility]m.  No discharge needs are identified at this time.  Anticipate routine home        Discharge Plan     Row Name 04/01/21 1042       Plan    Plan  Anticipate routine to home.  Pt  denies any additional needs.  Pt states no difficulties affording medication.  Pending transportation home on sons work schedule.   aware.    Patient/Family in Agreement with Plan  yes        Continued Care and Services - Admitted Since 3/31/2021    Coordination has not been started for this encounter.         Demographic Summary     Row Name 04/01/21 1035       General Information    Admission Type  observation    Arrived From  home;emergency department    Required Notices Provided  Observation Status Notice    Referral Source  admission list    Reason for Consult  discharge planning    Preferred Language  English     Used During This Interaction  no          Phone communication or documentation only - no physical contact with patient or family.    [place]aa [place] [address]  [phone] office  [phone]  fax  [email or web address]

## 2021-04-01 NOTE — PLAN OF CARE
Goal Outcome Evaluation:     Progress: no change  Outcome Summary: had Dr Quinton pierce states it is abnormal and await his input. VSS, pt has no c/o CP, SOA but c/o leg pain. Cont to monitor

## 2021-04-01 NOTE — PROGRESS NOTES
[facility] Medicine Services  INPATIENT PROGRESS NOTE  113/1   Hospitalist Team  LOS 0 days      Patient Care Team:  Anayeli Costa APRN as PCP - General (Family Medicine)  Barrett Evans MD as Consulting Physician (Cardiology)        Chief Complaint / Subjective  Chief Complaint   Patient presents with   • Chest Pain       HPI (4 hpi elements or status of 3 chronic)  80-year-old female presents to the ER with a chief complaint of heaviness to her chest which felt like a brick laying on it.  The patient had associated nausea and diaphoresis without associated shortness of breath.  The patient denies nausea at time of interview and is not diaphoretic, however, she continues to have chest heaviness and she states her throat feels like it is burning down into her chest.       Review of records: Stress test 1/13/2019 showed estimated ejection fraction 74%; otherwise normal Cardiolite imaging stress test; EF on echocardiogram dated 1/12/2020 1960 EF 65%     Noted        History taken from: patient chart    ROS  Constitutional: Positive for diaphoresis. Negative for chills and fever.   HENT: Positive for sore throat.    Cardiovascular: Positive for chest pain. Negative for leg swelling.   Respiratory: Negative for shortness of breath.    All other systems reviewed and are negative.   Noted          Family History   Problem Relation Age of Onset   • Cancer Father    • Diabetes Son    • Cancer Paternal Aunt    • Heart disease Paternal Aunt    • Cancer Paternal Uncle        Past Medical History:   Diagnosis Date   • Anxiety    • Arthritis    • Atrial fibrillation (CMS/HCC)     paroxysmal   • Broken shoulder     left-- due to fall 11-7-19 was at [facility]   • Buttock pain     rt side   • DDD (degenerative disc disease), lumbar    • Depression    • Edema     9/2020 foot   • GERD (gastroesophageal reflux disease)    • H/O fall    • Hip pain     rt   • Hypertension    • Hypothyroidism    • Insomnia     • Leg pain     rt   • Low back pain    • Neuropathy    • PONV (postoperative nausea and vomiting)    • Pulmonary hypertension (CMS/HCC)    • Radiculopathy    • Restless legs    • Spondylolisthesis    • Urinary incontinence        Social History     Socioeconomic History   • Marital status:      Spouse name: Not on file   • Number of children: Not on file   • Years of education: Not on file   • Highest education level: Not on file   Tobacco Use   • Smoking status: Never Smoker   • Smokeless tobacco: Never Used   Vaping Use   • Vaping Use: Never used   Substance and Sexual Activity   • Alcohol use: No   • Drug use: No   • Sexual activity: Defer       Prior to Admission medications    Medication Sig Start Date End Date Taking? Authorizing Provider   apixaban (ELIQUIS) 5 MG tablet tablet Take 1 tablet by mouth Every 12 (Twelve) Hours. 8/17/20  Yes Barrett Evans MD   cyclobenzaprine (FLEXERIL) 10 MG tablet Take 10 mg by mouth 2 (Two) Times a Day As Needed for Muscle Spasms.   Yes Elton Flores MD   famotidine (PEPCID) 20 MG tablet Take 20 mg by mouth Daily. 6/19/19  Yes Elton Flores MD   furosemide (LASIX) 40 MG tablet Take 40 mg by mouth 2 (Two) Times a Day. Uses for leg swelling 3/13/21  Yes Elton Flores MD   gabapentin (NEURONTIN) 600 MG tablet Take 1 tablet by mouth 4 (Four) Times a Day.  Patient taking differently: Take 600 mg by mouth 4 (Four) Times a Day As Needed. 7/31/20  Yes Shefali Worthy MD   HYDROcodone-acetaminophen (NORCO) 7.5-325 MG per tablet Take 1 tablet by mouth 4 (Four) Times a Day As Needed for Severe Pain . DNF before 3/31/2021 3/31/21  Yes Shefali Worthy MD   levothyroxine (SYNTHROID, LEVOTHROID) 75 MCG tablet Take 75 mcg by mouth Daily. Take preop 6/24/19  Yes Elton Flores MD   metoprolol succinate XL (TOPROL-XL) 50 MG 24 hr tablet TAKE 1 TABLET BY MOUTH ONCE DAILY 11/30/20  Yes Barrett Evans MD   pantoprazole  "(PROTONIX) 40 MG EC tablet Take 40 mg by mouth Daily. Afternoons 6/24/19  Yes Elton Flores MD   rOPINIRole (REQUIP) 0.25 MG tablet TAKE 1 TABLET BY MOUTH EVERY NIGHT 1 HOUR BEFORE BEDTIME  Patient taking differently: 0.25 mg Every Night. 10/6/20  Yes Shefali Worthy MD   sertraline (ZOLOFT) 50 MG tablet Take 50 mg by mouth Every Night. 6/4/20  Yes Elton Flores MD   sildenafil (REVATIO) 20 MG tablet Take 20 mg by mouth 2 (Two) Times a Day. 2/20/21  Yes Elton Flores MD   traZODone (DESYREL) 50 MG tablet Take 25 mg by mouth Every Night. 6/4/20  Yes Elton Flores MD        Objective    Physical Exam     Vital Signs  Temp:  [98.1 °F (36.7 °C)-98.6 °F (37 °C)] 98.6 °F (37 °C)  Heart Rate:  [] 89  Resp:  [16-20] 16  BP: (106-165)/(48-85) 128/75    Oxygen Therapy  SpO2: 93 %  Pulse Oximetry Type: Continuous  Device (Oxygen Therapy): room air  Flowsheet Rows      First Filed Value   Admission Height  162.6 cm (64\") Documented at 03/31/2021 1909   Admission Weight  81.6 kg (180 lb) Documented at 03/31/2021 1909        Weight change:    Intake & Output (last 3 days)       03/29 0701 - 03/30 0700 03/30 0701 - 03/31 0700 03/31 0701 - 04/01 0700 04/01 0701 - 04/02 0700    P.O.   240 360    Total Intake(mL/kg)   240 (3) 360 (4.4)    Urine (mL/kg/hr)   200     Total Output   200     Net   +40 +360                Lines, Drains & Airways    Active LDAs     Name:   Placement date:   Placement time:   Site:   Days:    Peripheral IV 03/31/21 1912 Right Antecubital   03/31/21 1912    Antecubital   less than 1                Physical Exam:    Physical Exam  Vitals and nursing note reviewed.   Constitutional:       General: She is not in acute distress.     Appearance: She is well-developed. She is obese. She is not ill-appearing, toxic-appearing or diaphoretic.   HENT:      Head: Normocephalic and atraumatic.      Right Ear: Ear canal and external ear normal.      Left Ear: Ear canal and " external ear normal.      Nose: Nose normal. No congestion or rhinorrhea.      Mouth/Throat:      Mouth: Mucous membranes are moist.      Pharynx: No oropharyngeal exudate.      Comments: Edentulous  Eyes:      General: No scleral icterus.        Right eye: No discharge.         Left eye: No discharge.      Extraocular Movements: Extraocular movements intact.      Conjunctiva/sclera: Conjunctivae normal.      Pupils: Pupils are equal, round, and reactive to light.   Neck:      Thyroid: No thyromegaly.      Vascular: No carotid bruit or JVD.      Trachea: No tracheal deviation.   Cardiovascular:      Rate and Rhythm: Normal rate and regular rhythm.      Heart sounds: Normal heart sounds. No murmur heard.   No friction rub. No gallop.    Pulmonary:      Effort: Pulmonary effort is normal. No respiratory distress.      Breath sounds: Normal breath sounds. No stridor. No wheezing, rhonchi or rales.   Chest:      Chest wall: No tenderness.   Abdominal:      General: Bowel sounds are normal. There is no distension.      Palpations: Abdomen is soft. There is no mass.      Tenderness: There is no abdominal tenderness. There is no guarding or rebound.      Hernia: No hernia is present.   Musculoskeletal:         General: No swelling, tenderness, deformity or signs of injury. Normal range of motion.      Cervical back: Normal range of motion and neck supple. No rigidity. No muscular tenderness.      Right lower leg: No edema.      Left lower leg: No edema.   Lymphadenopathy:      Cervical: No cervical adenopathy.   Skin:     General: Skin is warm and dry.      Coloration: Skin is not jaundiced or pale.      Findings: No bruising, erythema or rash.   Neurological:      General: No focal deficit present.      Mental Status: She is alert and oriented to person, place, and time. Mental status is at baseline.      Cranial Nerves: No cranial nerve deficit.      Sensory: No sensory deficit.      Motor: No weakness or abnormal muscle  tone.      Coordination: Coordination normal.   Psychiatric:         Mood and Affect: Mood normal.         Behavior: Behavior normal.         Thought Content: Thought content normal.         Judgment: Judgment normal.               PT Recommendation and Plan             Procedures:    * No surgery found *     Assessment/Plan with Problem wise       Active Hospital Problems    Diagnosis  POA   • **Chest pain [R07.9]  Yes     Priority: High   • Abnormal stress test [R94.39]  No   • Pulmonary hypertension (CMS/HCC) [I27.20]  Yes   • Atrial fibrillation (CMS/HCC) [I48.91]  Yes   • Essential hypertension [I10]  Yes   • Hypothyroidism [E03.9]  Yes      Resolved Hospital Problems   No resolved problems to display.        Estimated Creatinine Clearance: 51.1 mL/min (by C-G formula based on SCr of 0.92 mg/dL).    Code Status and Medical Interventions:   Ordered at: 03/31/21 2028     Code Status:    CPR     Medical Interventions (Level of Support Prior to Arrest):    Full       MEDICAL DECISION MAKING COMPLEXITY BY PROBLEM:       Chest pain:  Nitrates if needed  Narcotics if needed  Add beta-blockers if appropriate  Add ACE inhibitor if appropriate  At [facility] a reductase inhibitor if appropriate  Empiric aspirin  Control heart rate  Oxygen to keep sat up to 94%.  Stress test abnormal-await heart cath cardiology consulted      Atrial fibrillation:   Rate control  Beta-blockers  Calcium channel blockers  Digoxin  +/-Amiodarone  Anticoagulation per CHADS VASC2 SCORE  Close monitoring      Hypertension:  Continue home medications   Options include-  beta-blockers  Calcium channel blockers  ACE inhibitor  Vasodilators  Low-dose diuretics  PRN medications have not been shown to affect outcomes-to be avoided            Care coordination with nursing and cardiology for current care 04/01/21 2:50 PM EDT.    Plan for disposition:  anticipate pt will need 30 days or less of rehab            Continued Care and Services - Admitted Since  3/31/2021    Coordination has not been started for this encounter.        Historical & Objective Data     Results Review:    I reviewed the patient's new lab and radiology results. 04/01/21     Results from last 7 days   Lab Units 03/31/21  1914   WBC 10*3/mm3 8.60   HEMOGLOBIN g/dL 11.8*   HEMATOCRIT % 35.9   MCV fL 87.2   MCH pg 28.6   MPV fL 7.3   RDW % 15.0   PLATELETS 10*3/mm3 295     Results from last 7 days   Lab Units 04/01/21  0519 03/31/21  1914   SODIUM mmol/L 142 142   POTASSIUM mmol/L 3.9 3.2*   CHLORIDE mmol/L 103 101   CO2 mmol/L 30.0* 28.0   BUN mg/dL 12 11   CREATININE mg/dL 0.92 0.96   CALCIUM mg/dL 9.3 9.5   BILIRUBIN mg/dL  --  0.7   ALK PHOS U/L  --  102   ALT (SGPT) U/L  --  12   AST (SGOT) U/L  --  22   GLUCOSE mg/dL 108* 122*     Inflammatory Biomarkers        Invalid input(s): ESR, D-DIMER QUANTITATIVE,  PROCALCITONIN,  alllabslink(lactate:5)@ )@  Lab Results   Component Value Date    PHOS 3.1 01/14/2019     No results found for: HGBA1C  No results found for: CHOL, CHLPL, TRIG, HDL, LDL, LDLDIRECT  Lab Results   Component Value Date    LIPASE 20 (L) 02/11/2019               Pathology  Lab Results   Lab Value Date/Time    FINALDX  09/04/2019 1428     Polyp, ascending, polypectomy:    Tubular adenoma     YANY/tkd        COVID19   Date Value Ref Range Status   04/01/2021 Not Detected Not Detected - Ref. Range Final        Microbiology Results (last 10 days)     Procedure Component Value - Date/Time    COVID PRE-OP / PRE-PROCEDURE SCREENING ORDER (NO ISOLATION) - Swab, Nasopharynx [[id number]]  (Normal) Collected: 04/01/21 0013    Lab Status: Final result Specimen: Swab from Nasopharynx Updated: 04/01/21 0201    Narrative:      The following orders were created for panel order COVID PRE-OP / PRE-PROCEDURE SCREENING ORDER (NO ISOLATION) - Swab, Nasopharynx.  Procedure                               Abnormality         Status                     ---------                               -----------          ------                     COVID-19,CEPHEID,COR/KEVEN...[[id number]]  Normal              Final result                 Please view results for these tests on the individual orders.    COVID-19,CEPHEID,COR/KEVEN/PAD IN-HOUSE(OR EMERGENT/ADD-ON),NP SWAB IN TRANSPORT MEDIA 3-4 HR TAT, RT-PCR - Swab, Nasopharynx [[id number]]  (Normal) Collected: 04/01/21 0013    Lab Status: Final result Specimen: Swab from Nasopharynx Updated: 04/01/21 0201     COVID19 Not Detected    Narrative:      Fact sheet for providers: https://www.fda.gov/media/080290/download     Fact sheet for patients: https://www.fda.gov/media/369507/download          ECG/EMG Results (most recent)     Procedure Component Value Units Date/Time    ECG 12 Lead [[id number]] Collected: 03/31/21 1907     Updated: 03/31/21 1908     QT Interval 353 ms     Narrative:      HEART RATE= 106  bpm  RR Interval= 584  ms  SC Interval= 110  ms  P Horizontal Axis= 117  deg  P Front Axis= 60  deg  QRSD Interval= 74  ms  QT Interval= 353  ms  QRS Axis= 65  deg  T Wave Axis= 23  deg  - BORDERLINE ECG -  Sinus tachycardia with irregular rate  When compared with ECG of 02-Nov-2020 19:17:01,  No significant change  Electronically Signed By:   Date and Time of Study: 2021-03-31 19:07:35    ECG 12 Lead [[id number]] Collected: 04/01/21 0220     Updated: 04/01/21 0222     QT Interval 382 ms     Narrative:      HEART RATE= 81  bpm  RR Interval= 736  ms  SC Interval= 179  ms  P Horizontal Axis= 26  deg  P Front Axis= 70  deg  QRSD Interval= 72  ms  QT Interval= 382  ms  QRS Axis= 50  deg  T Wave Axis= 59  deg  - NORMAL ECG -  Sinus rhythm  When compared with ECG of 31-Mar-2021 19:07:35,  Significant rate decrease  Electronically Signed By:   Date and Time of Study: 2021-04-01 02:20:35          Results for orders placed in visit on 07/15/19    SCANNED VASCULAR STUDIES      Results for orders placed in visit on 06/24/20    SCANNED - ECHOCARDIOGRAM      XR Chest 1 View    Result Date:  3/31/2021  1. Stable exam, with mild chronic change in the left lower lung. No evidence of acute disease.  Electronically Signed By-Maia Finney MD On:3/31/2021 7:48 PM This report was finalized on [id number] by  Maia Finney MD.      I personally reviewed patient's x-ray films and my findings are: 0    I personally reviewed patient's EKG strip and my findings are: 0    Medication Review:   I have reviewed the patient's current medication list 04/01/21     Scheduled Meds  apixaban, 5 mg, Oral, Q12H  famotidine, 20 mg, Oral, Daily  gabapentin, 600 mg, Oral, 4x Daily  GI cocktail, , Oral, Once  levothyroxine, 75 mcg, Oral, Daily  metoprolol succinate XL, 50 mg, Oral, Daily  pantoprazole, 40 mg, Oral, Daily  rOPINIRole, 0.25 mg, Oral, Nightly  sertraline, 50 mg, Oral, Nightly  sildenafil, 20 mg, Oral, BID  sodium chloride, 10 mL, Intravenous, Q12H  traZODone, 25 mg, Oral, Nightly        Meds Infusions       DVT prophylaxis  Mechanical Order History:     None      Pharmalogical Order History:      Ordered     Dose Route Frequency Stop    03/31/21 2028  enoxaparin (LOVENOX) syringe 40 mg  Status:  Discontinued      40 mg SC Every 24 Hours 03/31/21 2153 03/31/21 2156  apixaban (ELIQUIS) tablet 5 mg      5 mg PO Every 12 Hours Scheduled --                Meds PRN  •  acetaminophen **OR** acetaminophen **OR** acetaminophen  •  cyclobenzaprine  •  HYDROcodone-acetaminophen  •  ondansetron  •  potassium chloride  •  potassium chloride  •  [COMPLETED] Insert peripheral IV **AND** sodium chloride  •  sodium chloride      Reno Roman MD  04/01/21  14:50 EDT

## 2021-04-01 NOTE — PROGRESS NOTES
Case Management/Social Work    Patient Name:  Catalina Moreno  YOB: 1940  MRN: [id number]  Admit Date:  3/31/2021      Pt not qualifying for [facility] due to pt is not homebound.  Pt drives herself daily.  Pt requesting aid for housekeeping etc.  I spoke to  and patient doesn’t qualify for [employer] without Medicaid.  Offered patient referal for Medicaid counseling but otherwise those services will be private pay.  Patient states she is not interested in Medicaid or private pay options at this time.      Electronically signed by:  Naa Rodriguez RN  04/01/21 12:14 EDT

## 2021-04-01 NOTE — H&P
[facility] Hospital Medicine Services      Patient Name: Catalina Moreno  : 1940  MRN: [id number]  Primary Care Physician: Anayeli Costa APRN  Date of admission: 3/31/2021    Patient Care Team:  Anayeli Costa APRN as PCP - General (Family Medicine)  Barrett Evans MD as Consulting Physician (Cardiology)          Subjective   History Present Illness     Chief Complaint:   Chief Complaint   Patient presents with   • Chest Pain       Ms. Moreno is a 80 y.o.  presents to [facility] complaining of chest pain.         80-year-old female presents to the ER with a chief complaint of heaviness to her chest which felt like a brick laying on it.  The patient had associated nausea and diaphoresis without associated shortness of breath.  The patient denies nausea at time of interview and is not diaphoretic, however, she continues to have chest heaviness and she states her throat feels like it is burning down into her chest.      Review of records: Stress test 2019 showed estimated ejection fraction 74%; otherwise normal Cardiolite imaging stress test; EF on echocardiogram dated 2020 1960 EF 65%        Review of Systems   Constitutional: Positive for diaphoresis. Negative for chills and fever.   HENT: Positive for sore throat.    Cardiovascular: Positive for chest pain. Negative for leg swelling.   Respiratory: Negative for shortness of breath.    All other systems reviewed and are negative.          Personal History     Past Medical History:   Past Medical History:   Diagnosis Date   • Anxiety    • Arthritis    • Atrial fibrillation (CMS/HCC)     paroxysmal   • Broken shoulder     left-- due to fall 19 was at [facility]   • Buttock pain     rt side   • DDD (degenerative disc disease), lumbar    • Depression    • Edema     2020 foot   • GERD (gastroesophageal reflux disease)    • H/O fall    • Hip pain     rt   • Hypertension    • Hypothyroidism    • Insomnia     • Leg pain     rt   • Low back pain    • Neuropathy    • PONV (postoperative nausea and vomiting)    • Pulmonary hypertension (CMS/HCC)    • Radiculopathy    • Restless legs    • Spondylolisthesis    • Urinary incontinence        Surgical History:      Past Surgical History:   Procedure Laterality Date   • BACK SURGERY  07/19/2018    PDC &  PSF L3-L5 insitu   • CHOLECYSTECTOMY     • COLONOSCOPY     • HYSTERECTOMY     • ROTATOR CUFF REPAIR Left            Family History: family history includes Cancer in her father, paternal aunt, and paternal uncle; Diabetes in her son; Heart disease in her paternal aunt. Otherwise pertinent FHx was reviewed and unremarkable.     Social History:  reports that she has never smoked. She has never used smokeless tobacco. She reports that she does not drink alcohol and does not use drugs.      Medications:  Prior to Admission medications    Medication Sig Start Date End Date Taking? Authorizing Provider   apixaban (ELIQUIS) 5 MG tablet tablet Take 1 tablet by mouth Every 12 (Twelve) Hours.  Patient taking differently: Take 5 mg by mouth Every 12 (Twelve) Hours. Last dose 10/9 am 8/17/20   Barrett Evans MD   cyclobenzaprine (FLEXERIL) 10 MG tablet Take 1 tablet by mouth 3 (Three) Times a Day As Needed for Muscle Spasms. 9/16/20   More Gamble APRN   famotidine (PEPCID) 20 MG tablet Take 20 mg by mouth 2 (Two) Times a Day. 6/19/19   Elton Flores MD   furosemide (LASIX) 40 MG tablet Take 40 mg by mouth 2 (Two) Times a Day. Uses for leg swelling 3/13/21   Elton Flores MD   gabapentin (NEURONTIN) 600 MG tablet Take 1 tablet by mouth 4 (Four) Times a Day.  Patient taking differently: Take 600 mg by mouth 4 (Four) Times a Day As Needed. Take preop 7/31/20   Shefali Worthy MD   HYDROcodone-acetaminophen (NORCO) 7.5-325 MG per tablet Take 1 tablet by mouth 4 (Four) Times a Day As Needed for Severe Pain . DNF before 3/31/2021 3/31/21   Shefali Worthy  MD   HYDROcodone-acetaminophen (NORCO) 7.5-325 MG per tablet Take 1 tablet by mouth 4 (Four) Times a Day As Needed for Severe Pain . DNF before 4/30/2021 4/30/21   Shefali Worthy MD   levothyroxine (SYNTHROID, LEVOTHROID) 75 MCG tablet Take 75 mcg by mouth Daily. Take preop 6/24/19   Elton Flores MD   lidocaine (LIDODERM) 5 % Place 2 patches on the skin as directed by provider Daily. Remove & Discard patch within 12 hours or as directed by MD  Patient taking differently: Place 2 patches on the skin as directed by provider Daily. Remove & Discard patch within 12 hours or as directed by MD  Back or knee  dont use preop 6/9/20   Shefali Worthy MD   methocarbamol (ROBAXIN) 500 MG tablet Take 500 mg by mouth 2 (Two) Times a Day. 5/2/20   Elton Flores MD   metoprolol succinate XL (TOPROL-XL) 50 MG 24 hr tablet TAKE 1 TABLET BY MOUTH ONCE DAILY 11/30/20   Barrett Evans MD   pantoprazole (PROTONIX) 40 MG EC tablet Take 40 mg by mouth Daily. Afternoons 6/24/19   Elton Flores MD   rOPINIRole (REQUIP) 0.25 MG tablet TAKE 1 TABLET BY MOUTH EVERY NIGHT 1 HOUR BEFORE BEDTIME  Patient taking differently: 0.25 mg Every Night. 10/6/20   Shefali Worthy MD   sertraline (ZOLOFT) 50 MG tablet Take 50 mg by mouth Every Night. 6/4/20   Elton Flores MD   sildenafil (REVATIO) 20 MG tablet Take 20 mg by mouth 2 (Two) Times a Day. 2/20/21   Elton Flores MD   traZODone (DESYREL) 50 MG tablet Take 25 mg by mouth Every Night. 6/4/20   Elton Flores MD       Allergies:    Allergies   Allergen Reactions   • Baclofen Rash   • Codeine Nausea Only   • Ibuprofen Unknown (See Comments)     Patient doesn't know----Motin   • Methocarbamol Unknown (See Comments)     Patient doesn't know   • Naproxen Unknown (See Comments)     Pt. Doesn't know    • Tizanidine Hcl Hallucinations   • Tolmetin Dizziness     Pt. Doesn't know-- same as Tolectin       Objective   Objective      Vital Signs  Temp:  [98.4 °F (36.9 °C)] 98.4 °F (36.9 °C)  Heart Rate:  [] 93  Resp:  [16] 16  BP: (137-164)/(48-71) 137/48  SpO2:  [94 %-98 %] 98 %  on   ;   Device (Oxygen Therapy): room air  Body mass index is 30.9 kg/m².    Physical Exam  Vitals and nursing note reviewed.   Constitutional:       General: She is not in acute distress.     Appearance: She is not ill-appearing, toxic-appearing or diaphoretic.   HENT:      Head: Normocephalic and atraumatic.      Right Ear: External ear normal.      Nose: Nose normal. No congestion.      Mouth/Throat:      Mouth: Mucous membranes are moist.      Pharynx: No oropharyngeal exudate or posterior oropharyngeal erythema.   Eyes:      General: No scleral icterus.        Right eye: No discharge.         Left eye: No discharge.      Extraocular Movements: Extraocular movements intact.      Conjunctiva/sclera: Conjunctivae normal.      Pupils: Pupils are equal, round, and reactive to light.   Cardiovascular:      Rate and Rhythm: Rhythm irregular.      Comments: Patient on bedside monitor looks to have a baseline sinus rhythm with intermittent runs of atrial fibrillation  Pulmonary:      Effort: Pulmonary effort is normal.      Breath sounds: Normal breath sounds.   Abdominal:      General: Bowel sounds are normal. There is no distension.      Palpations: Abdomen is soft.   Skin:     Capillary Refill: Capillary refill takes less than 2 seconds.   Neurological:      General: No focal deficit present.      Mental Status: She is alert and oriented to person, place, and time.   Psychiatric:         Mood and Affect: Mood normal.         Behavior: Behavior normal.         Thought Content: Thought content normal.         Judgment: Judgment normal.       Results Review:  I have personally reviewed most recent cardiac tracings, lab results and radiology images and interpretations and agree with findings.    Results from last 7 days   Lab Units 03/31/21  1914   WBC 10*3/mm3  8.60   HEMOGLOBIN g/dL 11.8*   HEMATOCRIT % 35.9   PLATELETS 10*3/mm3 295     Results from last 7 days   Lab Units 03/31/21  1914   SODIUM mmol/L 142   POTASSIUM mmol/L 3.2*   CHLORIDE mmol/L 101   CO2 mmol/L 28.0   BUN mg/dL 11   CREATININE mg/dL 0.96   GLUCOSE mg/dL 122*   CALCIUM mg/dL 9.5   ALT (SGPT) U/L 12   AST (SGOT) U/L 22   TROPONIN T ng/mL <0.010   PROBNP pg/mL 695.9     Estimated Creatinine Clearance: 49.1 mL/min (by C-G formula based on SCr of 0.96 mg/dL).  Brief Urine Lab Results  (Last result in the past 365 days)      Color   Clarity   Blood   Leuk Est   Nitrite   Protein   CREAT   Urine HCG        11/02/20 2015 Yellow Cloudy  Comment:  Result checked  Negative Trace Negative Negative               Microbiology Results (last 10 days)     ** No results found for the last 240 hours. **        I have personally reviewed the EKG and noted sinus rhythm with occasional PAC, when reviewing bedside monitor the patient looks to be in baseline sinus rhythm with intermittent runs of atrial fibrillation.  ECG/EMG Results (most recent)     Procedure Component Value Units Date/Time    ECG 12 Lead [[id number]] Collected: 03/31/21 1907     Updated: 03/31/21 1908     QT Interval 353 ms     Narrative:      HEART RATE= 106  bpm  RR Interval= 584  ms  VT Interval= 110  ms  P Horizontal Axis= 117  deg  P Front Axis= 60  deg  QRSD Interval= 74  ms  QT Interval= 353  ms  QRS Axis= 65  deg  T Wave Axis= 23  deg  - BORDERLINE ECG -  Sinus tachycardia with irregular rate  When compared with ECG of 02-Nov-2020 19:17:01,  No significant change  Electronically Signed By:   Date and Time of Study: 2021-03-31 19:07:35          Results for orders placed in visit on 07/15/19    SCANNED VASCULAR STUDIES      Results for orders placed in visit on 06/24/20    SCANNED - ECHOCARDIOGRAM      XR Chest 1 View    Result Date: 3/31/2021  1. Stable exam, with mild chronic change in the left lower lung. No evidence of acute disease.   Electronically Signed By-Maia Finney MD On:3/31/2021 7:48 PM This report was finalized on [id number] by  Maia Finney MD.        Estimated Creatinine Clearance: 49.1 mL/min (by C-G formula based on SCr of 0.96 mg/dL).    Assessment/Plan   Assessment/Plan       Active Hospital Problems    Diagnosis  POA   • Chest pain [R07.9]  Yes      Resolved Hospital Problems   No resolved problems to display.     Chest pressure with burning sensation in throat: GI cocktail; serial troponin; stress Myoview in a.m.    --Patient is noted to be on both Pepcid and Protonix for GERD    Hypokalemia, acute, mild with potassium 3.2--likely secondary to diuretic therapy: Add potassium replacement protocol; hold Lasix    Atrial fibrillation with intermittent sinus rhythm, chronic: Continue apixaban; continue metoprolol    Pulmonary hypertension, chronic: Continue Revatio    Hypertension, chronic, uncontrolled: Continue metoprolol; hold Lasix    Chronic back pain: Continue Flexeril; continue gabapentin; continue hydrocodone    CKD stage 2, chronic, creatinine 0.96: monitor BMP; hold lasix     GERD, chronic: Continue Pepcid; Protonix    Hypothyroidism, chronic: Continue levothyroxine    Hypertension, chronic: Continue metoprolol    Anxiety, with insomnia, chronic: Continue Zoloft; continue trazodone     RLS, chronic: Continue Requip     VTE Prophylaxis -apixaban   Mechanical Order History:     None      Pharmalogical Order History:      Ordered     Dose Route Frequency Stop    03/31/21 2028      --                CODE STATUS:    Code Status and Medical Interventions:   Ordered at: 03/31/21 2028     Code Status:    CPR     Medical Interventions (Level of Support Prior to Arrest):    Full       This patient has been examined wearing appropriate Personal Protective Equipment. 03/31/21      I discussed the patient's findings and my recommendations with patient.      Signature:Electronically signed by LAST Pastrana, 04/01/21, 2:17 AM  EDT.    [facility]ist [facility]

## 2021-04-01 NOTE — CONSULTS
Referring Provider: Dr. Roman  Reason for Consultation: Abnormal stress test unstable angina    Patient Care Team:  Anayeli Costa APRN as PCP - General (Family Medicine)  Barrett Evans MD as Consulting Physician (Cardiology)    Chief complaint chest pain        History of present illness:  Catalina Moreno is a 80 y.o. female who presents with chest pain.   80-year-old white male patient with a known history of paroxysmal atrial fibrillation hypertension  severe pulmonary hypertension now admitted to hospital with symptoms of chest heaviness like somebody sitting on her  It started somewhat suddenly she felt some radiation into the throat felt a burning sensation  She had problems with pulmonary hypertension possible RV dysfunction and fluid overload previously  No exacerbating or relieving factors  She felt some shortness of breath no diaphoresis or syncopal episodes  A stress Myoview showed large apical defect with some reperfusion on the rest images  Previously stress test was normal  Review of Systems   Constitutional: Positive for malaise/fatigue. Negative for fever.   HENT: Negative for congestion and hearing loss.    Eyes: Negative for double vision and visual disturbance.   Cardiovascular: Negative for chest pain, claudication, dyspnea on exertion, leg swelling and syncope.   Respiratory: Negative for cough and shortness of breath.    Endocrine: Negative for cold intolerance.   Skin: Negative for color change and rash.   Musculoskeletal: Negative for arthritis and joint pain.   Gastrointestinal: Negative for abdominal pain and heartburn.   Genitourinary: Negative for hematuria.   Neurological: Negative for excessive daytime sleepiness and dizziness.   Psychiatric/Behavioral: Negative for depression. The patient is not nervous/anxious.    All other systems reviewed and are negative.      History  Past Medical History:   Diagnosis Date   • Anxiety    • Arthritis    • Atrial fibrillation  (CMS/HCC)     paroxysmal   • Broken shoulder     left-- due to fall 11-7-19 was at [facility]   • Buttock pain     rt side   • DDD (degenerative disc disease), lumbar    • Depression    • Edema     9/2020 foot   • GERD (gastroesophageal reflux disease)    • H/O fall    • Hip pain     rt   • Hypertension    • Hypothyroidism    • Insomnia    • Leg pain     rt   • Low back pain    • Neuropathy    • PONV (postoperative nausea and vomiting)    • Pulmonary hypertension (CMS/HCC)    • Radiculopathy    • Restless legs    • Spondylolisthesis    • Urinary incontinence        Past Surgical History:   Procedure Laterality Date   • BACK SURGERY  07/19/2018    PDC &  PSF L3-L5 insitu   • CHOLECYSTECTOMY     • COLONOSCOPY     • HYSTERECTOMY     • ROTATOR CUFF REPAIR Left        Family History   Problem Relation Age of Onset   • Cancer Father    • Diabetes Son    • Cancer Paternal Aunt    • Heart disease Paternal Aunt    • Cancer Paternal Uncle        Social History     Tobacco Use   • Smoking status: Never Smoker   • Smokeless tobacco: Never Used   Vaping Use   • Vaping Use: Never used   Substance Use Topics   • Alcohol use: No   • Drug use: No        Medications Prior to Admission   Medication Sig Dispense Refill Last Dose   • apixaban (ELIQUIS) 5 MG tablet tablet Take 1 tablet by mouth Every 12 (Twelve) Hours. 180 tablet 3    • cyclobenzaprine (FLEXERIL) 10 MG tablet Take 10 mg by mouth 2 (Two) Times a Day As Needed for Muscle Spasms.      • famotidine (PEPCID) 20 MG tablet Take 20 mg by mouth Daily.  0    • furosemide (LASIX) 40 MG tablet Take 40 mg by mouth 2 (Two) Times a Day. Uses for leg swelling      • gabapentin (NEURONTIN) 600 MG tablet Take 1 tablet by mouth 4 (Four) Times a Day. (Patient taking differently: Take 600 mg by mouth 4 (Four) Times a Day As Needed.) 120 tablet 5    • HYDROcodone-acetaminophen (NORCO) 7.5-325 MG per tablet Take 1 tablet by mouth 4 (Four) Times a Day As Needed for Severe Pain . DNF before 3/31/2021  120 tablet 0    • levothyroxine (SYNTHROID, LEVOTHROID) 75 MCG tablet Take 75 mcg by mouth Daily. Take preop  0    • metoprolol succinate XL (TOPROL-XL) 50 MG 24 hr tablet TAKE 1 TABLET BY MOUTH ONCE DAILY 90 tablet 3    • pantoprazole (PROTONIX) 40 MG EC tablet Take 40 mg by mouth Daily. Afternoons  0    • rOPINIRole (REQUIP) 0.25 MG tablet TAKE 1 TABLET BY MOUTH EVERY NIGHT 1 HOUR BEFORE BEDTIME (Patient taking differently: 0.25 mg Every Night.) 30 tablet 5    • sertraline (ZOLOFT) 50 MG tablet Take 50 mg by mouth Every Night.      • sildenafil (REVATIO) 20 MG tablet Take 20 mg by mouth 2 (Two) Times a Day.      • traZODone (DESYREL) 50 MG tablet Take 25 mg by mouth Every Night.            Baclofen, Codeine, Ibuprofen, Methocarbamol, Naproxen, Tizanidine hcl, and Tolmetin    Scheduled Meds:apixaban, 5 mg, Oral, Q12H  famotidine, 20 mg, Oral, Daily  gabapentin, 600 mg, Oral, 4x Daily  GI cocktail, , Oral, Once  levothyroxine, 75 mcg, Oral, Daily  metoprolol succinate XL, 50 mg, Oral, Daily  pantoprazole, 40 mg, Oral, Daily  rOPINIRole, 0.25 mg, Oral, Nightly  sertraline, 50 mg, Oral, Nightly  sildenafil, 20 mg, Oral, BID  sodium chloride, 10 mL, Intravenous, Q12H  traZODone, 25 mg, Oral, Nightly      Continuous Infusions:   PRN Meds:.•  acetaminophen **OR** acetaminophen **OR** acetaminophen  •  cyclobenzaprine  •  HYDROcodone-acetaminophen  •  ondansetron  •  potassium chloride  •  potassium chloride  •  [COMPLETED] Insert peripheral IV **AND** sodium chloride  •  sodium chloride        VITAL SIGNS  Vitals:    04/01/21 0607 04/01/21 1041 04/01/21 1438 04/01/21 1750   BP: 106/64 150/85 128/75 127/74   BP Location: Left arm Left arm Left arm Left arm   Patient Position: Lying Lying Lying Lying   Pulse: 98 100 89 63   Resp: 16 20 16 18   Temp: 98.2 °F (36.8 °C) 98.1 °F (36.7 °C) 98.6 °F (37 °C) 98.2 °F (36.8 °C)   TempSrc: Oral Axillary Oral Oral   SpO2: 95% 96% 93% 96%   Weight: 81.3 kg (179 lb 3.7 oz)      Height:   "         Flowsheet Rows      First Filed Value   Admission Height  162.6 cm (64\") Documented at 03/31/2021 1909   Admission Weight  81.6 kg (180 lb) Documented at 03/31/2021 1909           TELEMETRY: Sinus rhythm    Physical Exam:  Constitutional:       Appearance: Well-developed.   Eyes:      General: No scleral icterus.     Conjunctiva/sclera: Conjunctivae normal.   HENT:      Head: Normocephalic and atraumatic.    Mouth/Throat:      Mouth: No oral lesions.      Pharynx: Uvula midline.   Neck:      Thyroid: No thyromegaly.      Vascular: No carotid bruit or JVD.      Trachea: Trachea normal.   Pulmonary:      Effort: Pulmonary effort is normal.      Breath sounds: Normal breath sounds.   Cardiovascular:      Normal rate. Regular rhythm.      No gallop.   Pulses:     Intact distal pulses.   Abdominal:      General: Bowel sounds are normal.      Palpations: Abdomen is soft.   Musculoskeletal: Normal range of motion.      Cervical back: Neck supple. Skin:     General: Skin is warm. There is no cyanosis.   Neurological:      Mental Status: Alert and oriented to person, place, and time.      Comments: No focal deficits   Psychiatric:         Behavior: Behavior is cooperative.                  LAB RESULTS (LAST 7 DAYS)    CBC  Results from last 7 days   Lab Units 03/31/21 1914   WBC 10*3/mm3 8.60   RBC 10*6/mm3 4.12   HEMOGLOBIN g/dL 11.8*   HEMATOCRIT % 35.9   MCV fL 87.2   PLATELETS 10*3/mm3 295       BMP  Results from last 7 days   Lab Units 04/01/21 0519 03/31/21 1914   SODIUM mmol/L 142 142   POTASSIUM mmol/L 3.9 3.2*   CHLORIDE mmol/L 103 101   CO2 mmol/L 30.0* 28.0   BUN mg/dL 12 11   CREATININE mg/dL 0.92 0.96   GLUCOSE mg/dL 108* 122*       CMP   Results from last 7 days   Lab Units 04/01/21 0519 03/31/21 1914   SODIUM mmol/L 142 142   POTASSIUM mmol/L 3.9 3.2*   CHLORIDE mmol/L 103 101   CO2 mmol/L 30.0* 28.0   BUN mg/dL 12 11   CREATININE mg/dL 0.92 0.96   GLUCOSE mg/dL 108* 122*   ALBUMIN g/dL  --  4.50 "   BILIRUBIN mg/dL  --  0.7   ALK PHOS U/L  --  102   AST (SGOT) U/L  --  22   ALT (SGPT) U/L  --  12         BNP        TROPONIN  Results from last 7 days   Lab Units 04/01/21  0041   TROPONIN T ng/mL <0.010       CoAg        Creatinine Clearance  Estimated Creatinine Clearance: 51.1 mL/min (by C-G formula based on SCr of 0.92 mg/dL).    ABG        Radiology  XR Chest 1 View    Result Date: 3/31/2021  1. Stable exam, with mild chronic change in the left lower lung. No evidence of acute disease.  Electronically Signed By-Maia Finney MD On:3/31/2021 7:48 PM This report was finalized on [id number] by  Maia Finney MD.          EKG          I personally viewed and interpreted the patient's EKG/Telemetry data:    ECHOCARDIOGRAM:    Results for orders placed in visit on 06/24/20    SCANNED - ECHOCARDIOGRAM      STRESS MYOVIEW:    CARDIAC CATHETERIZATION:    OTHER:         Assessment/Plan       Chest pain    Essential hypertension    Hypothyroidism    Atrial fibrillation (CMS/HCC)    Pulmonary hypertension (CMS/HCC)    Abnormal stress test      Symptoms of chest pressure/unstable angina  Abnormal stress test  History of paroxysmal atrial fibrillation and hypertension  Patient on Eliquis and it was not given today  We will hold tomorrow morning and cardiac cath tomorrow  We will try right and left cardiac catheterization  Discussed with the patient about all the risks and benefits of the procedure and alternate options  Patient fully understand and agreed to proceed with the procedure    I discussed the patients findings and my recommendations with patient and attending nurse    Barrett Evans MD  04/01/21  18:28 EDT

## 2021-04-01 NOTE — PLAN OF CARE
Problem: Chest Pain  Goal: Resolution of Chest Pain Symptoms  Outcome: Ongoing, Not Progressing     Problem: Cardiac-Related Pain (Acute Coronary Syndrome)  Goal: Absence of Cardiac-Related Pain  Outcome: Ongoing, Not Progressing     Problem: Fall Injury Risk  Goal: Absence of Fall and Fall-Related Injury  Outcome: Ongoing, Not Progressing     Problem: Adult Inpatient Plan of Care  Goal: Plan of Care Review  Flowsheets (Taken 3/31/2021 2227)  Progress: no change  Plan of Care Reviewed With: patient  Outcome Summary: new admission to the unit for chest pain   Goal Outcome Evaluation:  Plan of Care Reviewed With: patient  Progress: no change  Outcome Summary: new admission to the unit for chest pain

## 2021-04-02 PROBLEM — I20.0 UNSTABLE ANGINA PECTORIS: Status: ACTIVE | Noted: 2021-03-31

## 2021-04-02 LAB
ANION GAP SERPL CALCULATED.3IONS-SCNC: 11 MMOL/L (ref 5–15)
BUN SERPL-MCNC: 12 MG/DL (ref 8–23)
BUN/CREAT SERPL: 15 (ref 7–25)
CALCIUM SPEC-SCNC: 9.3 MG/DL (ref 8.6–10.5)
CHLORIDE SERPL-SCNC: 102 MMOL/L (ref 98–107)
CO2 SERPL-SCNC: 27 MMOL/L (ref 22–29)
CREAT SERPL-MCNC: 0.8 MG/DL (ref 0.57–1)
GFR SERPL CREATININE-BSD FRML MDRD: 69 ML/MIN/1.73
GLUCOSE SERPL-MCNC: 105 MG/DL (ref 65–99)
INR PPP: 0.99 (ref 0.93–1.1)
POTASSIUM SERPL-SCNC: 3.6 MMOL/L (ref 3.5–5.2)
PROTHROMBIN TIME: 10.9 SECONDS (ref 9.6–11.7)
QT INTERVAL: 382 MS
SODIUM SERPL-SCNC: 140 MMOL/L (ref 136–145)

## 2021-04-02 PROCEDURE — 4A023N7 MEASUREMENT OF CARDIAC SAMPLING AND PRESSURE, LEFT HEART, PERCUTANEOUS APPROACH: ICD-10-PCS | Performed by: INTERNAL MEDICINE

## 2021-04-02 PROCEDURE — 0 IOPAMIDOL PER 1 ML: Performed by: INTERNAL MEDICINE

## 2021-04-02 PROCEDURE — 25010000002 MIDAZOLAM PER 1 MG: Performed by: INTERNAL MEDICINE

## 2021-04-02 PROCEDURE — 93458 L HRT ARTERY/VENTRICLE ANGIO: CPT | Performed by: INTERNAL MEDICINE

## 2021-04-02 PROCEDURE — C1760 CLOSURE DEV, VASC: HCPCS | Performed by: INTERNAL MEDICINE

## 2021-04-02 PROCEDURE — 85610 PROTHROMBIN TIME: CPT | Performed by: INTERNAL MEDICINE

## 2021-04-02 PROCEDURE — 99232 SBSQ HOSP IP/OBS MODERATE 35: CPT | Performed by: INTERNAL MEDICINE

## 2021-04-02 PROCEDURE — 80048 BASIC METABOLIC PNL TOTAL CA: CPT | Performed by: INTERNAL MEDICINE

## 2021-04-02 PROCEDURE — C1894 INTRO/SHEATH, NON-LASER: HCPCS | Performed by: INTERNAL MEDICINE

## 2021-04-02 PROCEDURE — 99152 MOD SED SAME PHYS/QHP 5/>YRS: CPT | Performed by: INTERNAL MEDICINE

## 2021-04-02 PROCEDURE — C1769 GUIDE WIRE: HCPCS | Performed by: INTERNAL MEDICINE

## 2021-04-02 PROCEDURE — 25010000002 FENTANYL CITRATE (PF) 100 MCG/2ML SOLUTION: Performed by: INTERNAL MEDICINE

## 2021-04-02 PROCEDURE — B2111ZZ FLUOROSCOPY OF MULTIPLE CORONARY ARTERIES USING LOW OSMOLAR CONTRAST: ICD-10-PCS | Performed by: INTERNAL MEDICINE

## 2021-04-02 RX ORDER — ALUMINA, MAGNESIA, AND SIMETHICONE 2400; 2400; 240 MG/30ML; MG/30ML; MG/30ML
15 SUSPENSION ORAL EVERY 6 HOURS PRN
Status: DISCONTINUED | OUTPATIENT
Start: 2021-04-02 | End: 2021-04-03 | Stop reason: HOSPADM

## 2021-04-02 RX ORDER — FENTANYL CITRATE 50 UG/ML
INJECTION, SOLUTION INTRAMUSCULAR; INTRAVENOUS AS NEEDED
Status: DISCONTINUED | OUTPATIENT
Start: 2021-04-02 | End: 2021-04-02 | Stop reason: HOSPADM

## 2021-04-02 RX ORDER — DIPHENHYDRAMINE HCL 25 MG
25 CAPSULE ORAL EVERY 6 HOURS PRN
Status: DISCONTINUED | OUTPATIENT
Start: 2021-04-02 | End: 2021-04-03 | Stop reason: HOSPADM

## 2021-04-02 RX ORDER — ONDANSETRON 4 MG/1
4 TABLET, FILM COATED ORAL EVERY 6 HOURS PRN
Status: DISCONTINUED | OUTPATIENT
Start: 2021-04-02 | End: 2021-04-03 | Stop reason: HOSPADM

## 2021-04-02 RX ORDER — ACETAMINOPHEN 325 MG/1
650 TABLET ORAL EVERY 4 HOURS PRN
Status: DISCONTINUED | OUTPATIENT
Start: 2021-04-02 | End: 2021-04-03 | Stop reason: HOSPADM

## 2021-04-02 RX ORDER — ONDANSETRON 2 MG/ML
4 INJECTION INTRAMUSCULAR; INTRAVENOUS EVERY 6 HOURS PRN
Status: DISCONTINUED | OUTPATIENT
Start: 2021-04-02 | End: 2021-04-03 | Stop reason: HOSPADM

## 2021-04-02 RX ORDER — LIDOCAINE HYDROCHLORIDE 20 MG/ML
INJECTION, SOLUTION INFILTRATION; PERINEURAL AS NEEDED
Status: DISCONTINUED | OUTPATIENT
Start: 2021-04-02 | End: 2021-04-02 | Stop reason: HOSPADM

## 2021-04-02 RX ORDER — MIDAZOLAM HYDROCHLORIDE 1 MG/ML
INJECTION INTRAMUSCULAR; INTRAVENOUS AS NEEDED
Status: DISCONTINUED | OUTPATIENT
Start: 2021-04-02 | End: 2021-04-02 | Stop reason: HOSPADM

## 2021-04-02 RX ADMIN — Medication 10 ML: at 20:37

## 2021-04-02 RX ADMIN — SILDENAFIL 20 MG: 20 TABLET, FILM COATED ORAL at 20:37

## 2021-04-02 RX ADMIN — SILDENAFIL 20 MG: 20 TABLET, FILM COATED ORAL at 08:01

## 2021-04-02 RX ADMIN — SERTRALINE 50 MG: 50 TABLET, FILM COATED ORAL at 20:36

## 2021-04-02 RX ADMIN — GABAPENTIN 600 MG: 300 CAPSULE ORAL at 20:37

## 2021-04-02 RX ADMIN — PANTOPRAZOLE SODIUM 40 MG: 40 TABLET, DELAYED RELEASE ORAL at 08:01

## 2021-04-02 RX ADMIN — GABAPENTIN 600 MG: 300 CAPSULE ORAL at 08:01

## 2021-04-02 RX ADMIN — APIXABAN 5 MG: 5 TABLET, FILM COATED ORAL at 20:36

## 2021-04-02 RX ADMIN — HYDROCODONE BITARTRATE AND ACETAMINOPHEN 1 TABLET: 7.5; 325 TABLET ORAL at 08:01

## 2021-04-02 RX ADMIN — GABAPENTIN 600 MG: 300 CAPSULE ORAL at 18:18

## 2021-04-02 RX ADMIN — LEVOTHYROXINE SODIUM 75 MCG: 0.07 TABLET ORAL at 08:01

## 2021-04-02 RX ADMIN — ROPINIROLE 0.25 MG: 0.25 TABLET, FILM COATED ORAL at 20:37

## 2021-04-02 RX ADMIN — TRAZODONE HYDROCHLORIDE 25 MG: 50 TABLET ORAL at 20:36

## 2021-04-02 RX ADMIN — HYDROCODONE BITARTRATE AND ACETAMINOPHEN 1 TABLET: 7.5; 325 TABLET ORAL at 14:50

## 2021-04-02 RX ADMIN — METOPROLOL SUCCINATE 50 MG: 50 TABLET, EXTENDED RELEASE ORAL at 08:01

## 2021-04-02 RX ADMIN — GABAPENTIN 600 MG: 300 CAPSULE ORAL at 12:36

## 2021-04-02 RX ADMIN — Medication 10 ML: at 08:02

## 2021-04-02 RX ADMIN — FAMOTIDINE 20 MG: 20 TABLET, FILM COATED ORAL at 08:01

## 2021-04-02 RX ADMIN — HYDROCODONE BITARTRATE AND ACETAMINOPHEN 1 TABLET: 7.5; 325 TABLET ORAL at 20:37

## 2021-04-02 RX ADMIN — CYCLOBENZAPRINE HYDROCHLORIDE 10 MG: 10 TABLET, FILM COATED ORAL at 20:37

## 2021-04-02 NOTE — PROGRESS NOTES
Reason for follow-up: Chest pain/unstable angina  Abnormal stress test  Pulmonary hypertension     Patient Care Team:  Anayeli Costa APRN as PCP - General (Family Medicine)  Barrett Evans MD as Consulting Physician (Cardiology)    Subjective .   Feels okay     Review of Systems   Constitutional: Positive for malaise/fatigue. Negative for fever.   HENT: Negative for congestion and hearing loss.    Eyes: Negative for double vision and visual disturbance.   Cardiovascular: Negative for chest pain, claudication, dyspnea on exertion, leg swelling and syncope.   Respiratory: Negative for cough and shortness of breath.    Endocrine: Negative for cold intolerance.   Skin: Negative for color change and rash.   Musculoskeletal: Negative for arthritis and joint pain.   Gastrointestinal: Negative for abdominal pain and heartburn.   Genitourinary: Negative for hematuria.   Neurological: Negative for excessive daytime sleepiness and dizziness.   Psychiatric/Behavioral: Negative for depression. The patient is not nervous/anxious.    All other systems reviewed and are negative.      Baclofen, Codeine, Ibuprofen, Methocarbamol, Naproxen, Tizanidine hcl, and Tolmetin    Scheduled Meds:[MAR Hold] apixaban, 5 mg, Oral, Q12H  famotidine, 20 mg, Oral, Daily  gabapentin, 600 mg, Oral, 4x Daily  [MAR Hold] GI cocktail, , Oral, Once  [MAR Hold] levothyroxine, 75 mcg, Oral, Daily  metoprolol succinate XL, 50 mg, Oral, Daily  [MAR Hold] pantoprazole, 40 mg, Oral, Daily  [MAR Hold] rOPINIRole, 0.25 mg, Oral, Nightly  [MAR Hold] sertraline, 50 mg, Oral, Nightly  [MAR Hold] sildenafil, 20 mg, Oral, BID  [MAR Hold] sodium chloride, 10 mL, Intravenous, Q12H  [MAR Hold] traZODone, 25 mg, Oral, Nightly      Continuous Infusions:   PRN Meds:.•  [MAR Hold] acetaminophen **OR** [MAR Hold] acetaminophen **OR** [MAR Hold] acetaminophen  •  [MAR Hold] cyclobenzaprine  •  [MAR Hold] HYDROcodone-acetaminophen  •  [MAR Hold]  "ondansetron  •  [MAR Hold] potassium chloride  •  potassium chloride  •  [COMPLETED] Insert peripheral IV **AND** [MAR Hold] sodium chloride  •  [MAR Hold] sodium chloride    Objective   Is comfortable sitting in the chair    VITAL SIGNS  Vitals:    04/01/21 2041 04/02/21 0346 04/02/21 0759 04/02/21 1203   BP: 141/96 139/79 130/60 115/70   BP Location: Left arm Left arm Left arm Left arm   Patient Position: Lying Lying Lying Lying   Pulse: 93 72 73 72   Resp: 17 16 16 17   Temp: 98.3 °F (36.8 °C) 97.9 °F (36.6 °C) 98.4 °F (36.9 °C) 97.8 °F (36.6 °C)   TempSrc: Oral Oral Oral Oral   SpO2: 96% 94% 93% 93%   Weight:  81.2 kg (179 lb 0.2 oz)     Height:           Flowsheet Rows      First Filed Value   Admission Height  162.6 cm (64\") Documented at 03/31/2021 1909   Admission Weight  81.6 kg (180 lb) Documented at 03/31/2021 1909           TELEMETRY: Sinus rhythm    Physical Exam:  Constitutional:       Appearance: Well-developed.   Eyes:      General: No scleral icterus.     Conjunctiva/sclera: Conjunctivae normal.   HENT:      Head: Normocephalic and atraumatic.    Mouth/Throat:      Mouth: No oral lesions.      Pharynx: Uvula midline.   Neck:      Thyroid: No thyromegaly.      Vascular: No carotid bruit or JVD.      Trachea: Trachea normal.   Pulmonary:      Effort: Pulmonary effort is normal.      Breath sounds: Normal breath sounds.   Cardiovascular:      Normal rate. Regular rhythm.      No gallop.   Pulses:     Intact distal pulses.   Abdominal:      General: Bowel sounds are normal.      Palpations: Abdomen is soft.   Musculoskeletal: Normal range of motion.      Cervical back: Neck supple. Skin:     General: Skin is warm. There is no cyanosis.   Neurological:      Mental Status: Alert and oriented to person, place, and time.      Comments: No focal deficits   Psychiatric:         Behavior: Behavior is cooperative.                  LAB RESULTS (LAST 7 DAYS)    CBC  Results from last 7 days   Lab Units " 04/01/21  1908 03/31/21  1914   WBC 10*3/mm3 7.50 8.60   RBC 10*6/mm3 4.06 4.12   HEMOGLOBIN g/dL 11.7* 11.8*   HEMATOCRIT % 35.2 35.9   MCV fL 86.6 87.2   PLATELETS 10*3/mm3 292 295       BMP  Results from last 7 days   Lab Units 04/02/21  0405 04/01/21 0519 03/31/21 1914   SODIUM mmol/L 140 142 142   POTASSIUM mmol/L 3.6 3.9 3.2*   CHLORIDE mmol/L 102 103 101   CO2 mmol/L 27.0 30.0* 28.0   BUN mg/dL 12 12 11   CREATININE mg/dL 0.80 0.92 0.96   GLUCOSE mg/dL 105* 108* 122*       CMP   Results from last 7 days   Lab Units 04/02/21 0405 04/01/21 0519 03/31/21 1914   SODIUM mmol/L 140 142 142   POTASSIUM mmol/L 3.6 3.9 3.2*   CHLORIDE mmol/L 102 103 101   CO2 mmol/L 27.0 30.0* 28.0   BUN mg/dL 12 12 11   CREATININE mg/dL 0.80 0.92 0.96   GLUCOSE mg/dL 105* 108* 122*   ALBUMIN g/dL  --   --  4.50   BILIRUBIN mg/dL  --   --  0.7   ALK PHOS U/L  --   --  102   AST (SGOT) U/L  --   --  22   ALT (SGPT) U/L  --   --  12         BNP        TROPONIN  Results from last 7 days   Lab Units 04/01/21  0041   TROPONIN T ng/mL <0.010       CoAg  Results from last 7 days   Lab Units 04/02/21  0405   INR  0.99       Creatinine Clearance  Estimated Creatinine Clearance: 58.8 mL/min (by C-G formula based on SCr of 0.8 mg/dL).    ABG        Radiology  XR Chest 1 View    Result Date: 3/31/2021  1. Stable exam, with mild chronic change in the left lower lung. No evidence of acute disease.  Electronically Signed By-Maia Finney MD On:3/31/2021 7:48 PM This report was finalized on [id number] by  Maia Finney MD.            EKG        I personally viewed and interpreted the patient's EKG/Telemetry data:    ECHOCARDIOGRAM:    Results for orders placed in visit on 06/24/20    SCANNED - ECHOCARDIOGRAM    STRESS MYOVIEW:    CARDIAC CATHETERIZATION:    OTHER:         Assessment/Plan       Chest pain    Unstable angina pectoris (CMS/HCC)    Essential hypertension    Hypothyroidism    Atrial fibrillation (CMS/HCC)    Pulmonary  hypertension (CMS/HCC)    Abnormal stress test      Symptoms of chest pain/unstable angina  Patient stress test was abnormal  We will proceed with cardiac catheterization all the risks and benefits explained to the patient and alternate options  He fully understand and agreed to proceed with the procedure  Patient underwent cardiac catheterization no obstructive CAD  Aggressive control of hypertension cardiac risk factor modification restart Eliquis from morning  I discussed the patients findings and my recommendations with patient     Barrett Evans MD  04/02/21  15:57 EDT

## 2021-04-02 NOTE — PROGRESS NOTES
Continued Stay Note  [facility]     Patient Name: Catalina Moreno  MRN: [id number]  Today's Date: 4/2/2021    Admit Date: 3/31/2021    Discharge Plan     Row Name 04/02/21 1052       Plan    Plan  HOme    Plan Comments  Spoke to patient at bedside. she denies dc needs at this time. Barrier to dc: heart cath today at 3pm          Expected Discharge Date and Time     Expected Discharge Date Expected Discharge Time    Apr 3, 2021             Jazmine Concepcion RN   Met with patient in room wearing PPE: mask, face shield/goggles, gloves, gown.      Maintained distance greater than six feet and spent less than 15 minutes in the room.      Ariana Concepcion RN  Complex Case Manager  [facility] Care Coordination  [phone]  [phone]-office  [phone]  [email or web address]

## 2021-04-02 NOTE — PROGRESS NOTES
[facility]  INPATIENT PROGRESS NOTE  Pool/OPCV   Hospitalist Team  LOS 1 days      Patient Care Team:  Anayeli Costa APRN as PCP - General (Family Medicine)  Barrett Evans MD as Consulting Physician (Cardiology)        Chief Complaint / Subjective  Chief Complaint   Patient presents with   • Chest Pain       HPI (4 hpi elements or status of 3 chronic)  80-year-old female presents to the ER with a chief complaint of heaviness to her chest which felt like a brick laying on it.  The patient had associated nausea and diaphoresis without associated shortness of breath.  The patient denies nausea at time of interview and is not diaphoretic, however, she continues to have chest heaviness and she states her throat feels like it is burning down into her chest.       Review of records: Stress test 1/13/2019 showed estimated ejection fraction 74%; otherwise normal Cardiolite imaging stress test; EF on echocardiogram dated 1/12/2020 1960 EF 65%     Status post heart cath 04/02/21, 4:56 PM EDT      Chest Pain           History taken from: patient chart    Review of Systems   Constitutional: Negative.   HENT: Negative.    Eyes: Negative.    Cardiovascular: Negative.    Respiratory: Negative.    Endocrine: Negative.    Hematologic/Lymphatic: Negative.    Skin: Negative.    Musculoskeletal: Negative.    Gastrointestinal: Negative.    Genitourinary: Negative.    Neurological: Negative.    Psychiatric/Behavioral: Negative.    Allergic/Immunologic: Negative.    All other systems reviewed and are negative.          Family History   Problem Relation Age of Onset   • Cancer Father    • Diabetes Son    • Cancer Paternal Aunt    • Heart disease Paternal Aunt    • Cancer Paternal Uncle        Past Medical History:   Diagnosis Date   • Anxiety    • Arthritis    • Atrial fibrillation (CMS/HCC)     paroxysmal   • Broken shoulder     left-- due to fall 11-7-19 was at [facility]   • Buttock pain      rt side   • DDD (degenerative disc disease), lumbar    • Depression    • Edema     9/2020 foot   • GERD (gastroesophageal reflux disease)    • H/O fall    • Hip pain     rt   • Hypertension    • Hypothyroidism    • Insomnia    • Leg pain     rt   • Low back pain    • Neuropathy    • PONV (postoperative nausea and vomiting)    • Pulmonary hypertension (CMS/HCC)    • Radiculopathy    • Restless legs    • Spondylolisthesis    • Urinary incontinence        Social History     Socioeconomic History   • Marital status:      Spouse name: Not on file   • Number of children: Not on file   • Years of education: Not on file   • Highest education level: Not on file   Tobacco Use   • Smoking status: Never Smoker   • Smokeless tobacco: Never Used   Vaping Use   • Vaping Use: Never used   Substance and Sexual Activity   • Alcohol use: No   • Drug use: No   • Sexual activity: Defer       Prior to Admission medications    Medication Sig Start Date End Date Taking? Authorizing Provider   apixaban (ELIQUIS) 5 MG tablet tablet Take 1 tablet by mouth Every 12 (Twelve) Hours. 8/17/20  Yes Barrett Evans MD   cyclobenzaprine (FLEXERIL) 10 MG tablet Take 10 mg by mouth 2 (Two) Times a Day As Needed for Muscle Spasms.   Yes ProviderElton MD   famotidine (PEPCID) 20 MG tablet Take 20 mg by mouth Daily. 6/19/19  Yes Elton Flores MD   furosemide (LASIX) 40 MG tablet Take 40 mg by mouth 2 (Two) Times a Day. Uses for leg swelling 3/13/21  Yes Elton Flores MD   gabapentin (NEURONTIN) 600 MG tablet Take 1 tablet by mouth 4 (Four) Times a Day.  Patient taking differently: Take 600 mg by mouth 4 (Four) Times a Day As Needed. 7/31/20  Yes Shefali Worthy MD   HYDROcodone-acetaminophen (NORCO) 7.5-325 MG per tablet Take 1 tablet by mouth 4 (Four) Times a Day As Needed for Severe Pain . DNF before 3/31/2021 3/31/21  Yes Shefali Worthy MD   levothyroxine (SYNTHROID, LEVOTHROID) 75 MCG tablet Take  "75 mcg by mouth Daily. Take preop 6/24/19  Yes Elton Flores MD   metoprolol succinate XL (TOPROL-XL) 50 MG 24 hr tablet TAKE 1 TABLET BY MOUTH ONCE DAILY 11/30/20  Yes Barrett Evans MD   pantoprazole (PROTONIX) 40 MG EC tablet Take 40 mg by mouth Daily. Afternoons 6/24/19  Yes Elton Flores MD   rOPINIRole (REQUIP) 0.25 MG tablet TAKE 1 TABLET BY MOUTH EVERY NIGHT 1 HOUR BEFORE BEDTIME  Patient taking differently: 0.25 mg Every Night. 10/6/20  Yes Shefali Worthy MD   sertraline (ZOLOFT) 50 MG tablet Take 50 mg by mouth Every Night. 6/4/20  Yes Elton Flores MD   sildenafil (REVATIO) 20 MG tablet Take 20 mg by mouth 2 (Two) Times a Day. 2/20/21  Yes Elton Flores MD   traZODone (DESYREL) 50 MG tablet Take 25 mg by mouth Every Night. 6/4/20  Yes Elton Flores MD        Objective    Physical Exam     Vital Signs  Temp:  [97.8 °F (36.6 °C)-98.4 °F (36.9 °C)] 97.8 °F (36.6 °C)  Heart Rate:  [63-93] 76  Resp:  [11-18] 11  BP: (115-141)/(60-96) 139/64    Oxygen Therapy  SpO2: 96 %  Pulse Oximetry Type: Intermittent  Device (Oxygen Therapy): nasal cannula  Flow (L/min): 2  Flowsheet Rows      First Filed Value   Admission Height  162.6 cm (64\") Documented at 03/31/2021 1909   Admission Weight  81.6 kg (180 lb) Documented at 03/31/2021 1909        Weight change: -0.447 kg (-15.8 oz)   Intake & Output (last 3 days)       03/30 0701 - 03/31 0700 03/31 0701 - 04/01 0700 04/01 0701 - 04/02 0700 04/02 0701 - 04/03 0700    P.O.  240 580 0    Total Intake(mL/kg)  240 (3) 580 (7.1) 0 (0)    Urine (mL/kg/hr)  200      Total Output  200      Net  +40 +580 0            Urine Unmeasured Occurrence   1 x         Lines, Drains & Airways    Active LDAs     Name:   Placement date:   Placement time:   Site:   Days:    Peripheral IV 03/31/21 1912 Right Antecubital   03/31/21 1912    Antecubital   less than 1                Physical Exam:    Physical Exam  Vitals and nursing note " reviewed.   Constitutional:       General: She is not in acute distress.     Appearance: She is well-developed. She is obese. She is not ill-appearing, toxic-appearing or diaphoretic.   HENT:      Head: Normocephalic and atraumatic.      Right Ear: Ear canal and external ear normal.      Left Ear: Ear canal and external ear normal.      Nose: Nose normal. No congestion or rhinorrhea.      Mouth/Throat:      Mouth: Mucous membranes are moist.      Pharynx: No oropharyngeal exudate.      Comments: Edentulous  Eyes:      General: No scleral icterus.        Right eye: No discharge.         Left eye: No discharge.      Extraocular Movements: Extraocular movements intact.      Conjunctiva/sclera: Conjunctivae normal.      Pupils: Pupils are equal, round, and reactive to light.   Neck:      Thyroid: No thyromegaly.      Vascular: No carotid bruit or JVD.      Trachea: No tracheal deviation.   Cardiovascular:      Rate and Rhythm: Normal rate and regular rhythm.      Heart sounds: Normal heart sounds. No murmur heard.   No friction rub. No gallop.    Pulmonary:      Effort: Pulmonary effort is normal. No respiratory distress.      Breath sounds: Normal breath sounds. No stridor. No wheezing, rhonchi or rales.   Chest:      Chest wall: No tenderness.   Abdominal:      General: Bowel sounds are normal. There is no distension.      Palpations: Abdomen is soft. There is no mass.      Tenderness: There is no abdominal tenderness. There is no guarding or rebound.      Hernia: No hernia is present.   Musculoskeletal:         General: No swelling, tenderness, deformity or signs of injury. Normal range of motion.      Cervical back: Normal range of motion and neck supple. No rigidity. No muscular tenderness.      Right lower leg: No edema.      Left lower leg: No edema.   Lymphadenopathy:      Cervical: No cervical adenopathy.   Skin:     General: Skin is warm and dry.      Coloration: Skin is not jaundiced or pale.      Findings:  No bruising, erythema or rash.   Neurological:      General: No focal deficit present.      Mental Status: She is alert and oriented to person, place, and time. Mental status is at baseline.      Cranial Nerves: No cranial nerve deficit.      Sensory: No sensory deficit.      Motor: No weakness or abnormal muscle tone.      Coordination: Coordination normal.   Psychiatric:         Mood and Affect: Mood normal.         Behavior: Behavior normal.         Thought Content: Thought content normal.         Judgment: Judgment normal.     Reviewed, no change in above data from the prior day.          PT Recommendation and Plan             Procedures:    Procedure(s):  Cardiac Catheterization/Vascular Study (N/A)     Assessment/Plan with Problem wise       Active Hospital Problems    Diagnosis  POA   • **Chest pain [R07.9]  Yes     Priority: High   • Abnormal stress test [R94.39]  No   • Unstable angina pectoris (CMS/HCC) [I20.0]  Unknown   • Pulmonary hypertension (CMS/HCC) [I27.20]  Yes   • Atrial fibrillation (CMS/HCC) [I48.91]  Yes   • Essential hypertension [I10]  Yes   • Hypothyroidism [E03.9]  Yes      Resolved Hospital Problems   No resolved problems to display.        Estimated Creatinine Clearance: 58.8 mL/min (by C-G formula based on SCr of 0.8 mg/dL).    Code Status and Medical Interventions:   Ordered at: 03/31/21 2028     Code Status:    CPR     Medical Interventions (Level of Support Prior to Arrest):    Full       MEDICAL DECISION MAKING COMPLEXITY BY PROBLEM:       Chest pain:  Nitrates if needed  Narcotics if needed  Add beta-blockers if appropriate  Add ACE inhibitor if appropriate  At [employer] a reductase inhibitor if appropriate  Empiric aspirin  Control heart rate  Oxygen to keep sat up to 94%.  Stress test abnormal-await heart cath cardiology consulted      Atrial fibrillation:   Rate control  Beta-blockers  Calcium channel blockers  Digoxin  +/-Amiodarone  Anticoagulation per CHADS VASC2 SCORE  Close  monitoring      Hypertension:  Continue home medications   Options include-  beta-blockers  Calcium channel blockers  ACE inhibitor  Vasodilators  Low-dose diuretics  PRN medications have not been shown to affect outcomes-to be avoided            Care coordination with nursing and cardiology for current care 04/01/21 2:50 PM EDT.    Status post heart cath.  Care coordination with nursing for discharge planning    Plan for disposition:  anticipate pt will need 30 days or less of rehab            Continued Care and Services - Admitted Since 3/31/2021    Coordination has not been started for this encounter.        Historical & Objective Data     Results Review:    I reviewed the patient's new lab and radiology results. 04/02/21     Results from last 7 days   Lab Units 04/01/21  1908 03/31/21  1914   WBC 10*3/mm3 7.50 8.60   HEMOGLOBIN g/dL 11.7* 11.8*   HEMATOCRIT % 35.2 35.9   MCV fL 86.6 87.2   MCH pg 28.9 28.6   MPV fL 7.6 7.3   RDW % 15.7* 15.0   PLATELETS 10*3/mm3 292 295     Results from last 7 days   Lab Units 04/02/21  0405 04/01/21  0519 03/31/21  1914   SODIUM mmol/L 140 142 142   POTASSIUM mmol/L 3.6 3.9 3.2*   CHLORIDE mmol/L 102 103 101   CO2 mmol/L 27.0 30.0* 28.0   BUN mg/dL 12 12 11   CREATININE mg/dL 0.80 0.92 0.96   CALCIUM mg/dL 9.3 9.3 9.5   BILIRUBIN mg/dL  --   --  0.7   ALK PHOS U/L  --   --  102   ALT (SGPT) U/L  --   --  12   AST (SGOT) U/L  --   --  22   GLUCOSE mg/dL 105* 108* 122*     Inflammatory Biomarkers        Invalid input(s): ESR, D-DIMER QUANTITATIVE,  PROCALCITONIN,  alllabslink(lactate:5)@ )@  Lab Results   Component Value Date    PHOS 3.1 01/14/2019     No results found for: HGBA1C  No results found for: CHOL, CHLPL, TRIG, HDL, LDL, LDLDIRECT  Lab Results   Component Value Date    LIPASE 20 (L) 02/11/2019     Results from last 7 days   Lab Units 04/02/21  0405   INR  0.99           Pathology  Lab Results   Lab Value Date/Time    FINALDX  09/04/2019 1428     Polyp, ascending,  polypectomy:    Tubular adenoma     YANY/tkd        COVID19   Date Value Ref Range Status   04/01/2021 Not Detected Not Detected - Ref. Range Final        Microbiology Results (last 10 days)     Procedure Component Value - Date/Time    COVID PRE-OP / PRE-PROCEDURE SCREENING ORDER (NO ISOLATION) - Swab, Nasopharynx [[id number]]  (Normal) Collected: 04/01/21 0013    Lab Status: Final result Specimen: Swab from Nasopharynx Updated: 04/01/21 0201    Narrative:      The following orders were created for panel order COVID PRE-OP / PRE-PROCEDURE SCREENING ORDER (NO ISOLATION) - Swab, Nasopharynx.  Procedure                               Abnormality         Status                     ---------                               -----------         ------                     COVID-19,CEPHEID,COR/KEVEN...[[id number]]  Normal              Final result                 Please view results for these tests on the individual orders.    COVID-19,CEPHEID,COR/KEVEN/PAD IN-HOUSE(OR EMERGENT/ADD-ON),NP SWAB IN TRANSPORT MEDIA 3-4 HR TAT, RT-PCR - Swab, Nasopharynx [[id number]]  (Normal) Collected: 04/01/21 0013    Lab Status: Final result Specimen: Swab from Nasopharynx Updated: 04/01/21 0201     COVID19 Not Detected    Narrative:      Fact sheet for providers: https://www.fda.gov/media/483453/download     Fact sheet for patients: https://www.fda.gov/media/997090/download          ECG/EMG Results (most recent)     Procedure Component Value Units Date/Time    ECG 12 Lead [[id number]] Collected: 03/31/21 1907     Updated: 04/01/21 1710     QT Interval 353 ms     Narrative:      HEART RATE= 106  bpm  RR Interval= 584  ms  IL Interval= 110  ms  P Horizontal Axis= 117  deg  P Front Axis= 60  deg  QRSD Interval= 74  ms  QT Interval= 353  ms  QRS Axis= 65  deg  T Wave Axis= 23  deg  - BORDERLINE ECG -  Sinus tachycardia with irregular rate  When compared with ECG of 02-Nov-2020 19:17:01,  No significant change  Electronically Signed By: Mark Camara (KEVEN)  01-Apr-2021 17:09:37  Date and Time of Study: 2021-03-31 19:07:35    ECG 12 Lead [[id number]] Collected: 04/01/21 0220     Updated: 04/02/21 0620     QT Interval 382 ms     Narrative:      HEART RATE= 81  bpm  RR Interval= 736  ms  IA Interval= 179  ms  P Horizontal Axis= 26  deg  P Front Axis= 70  deg  QRSD Interval= 72  ms  QT Interval= 382  ms  QRS Axis= 50  deg  T Wave Axis= 59  deg  - NORMAL ECG -  Sinus rhythm  When compared with ECG of 31-Mar-2021 19:07:35,  Significant rate decrease  Electronically Signed By: Elder Fernández ([facility]) 02-Apr-2021 06:19:47  Date and Time of Study: 2021-04-01 02:20:35          Results for orders placed in visit on 07/15/19    SCANNED VASCULAR STUDIES      Results for orders placed in visit on 06/24/20    SCANNED - ECHOCARDIOGRAM      XR Chest 1 View    Result Date: 3/31/2021  1. Stable exam, with mild chronic change in the left lower lung. No evidence of acute disease.  Electronically Signed By-Maia Finney MD On:3/31/2021 7:48 PM This report was finalized on [id number] by  Maia Finney MD.      I personally reviewed patient's x-ray films and my findings are: 0    I personally reviewed patient's EKG strip and my findings are: 0    Medication Review:   I have reviewed the patient's current medication list 04/02/21     Scheduled Meds  apixaban, 5 mg, Oral, Q12H  famotidine, 20 mg, Oral, Daily  gabapentin, 600 mg, Oral, 4x Daily  GI cocktail, , Oral, Once  levothyroxine, 75 mcg, Oral, Daily  metoprolol succinate XL, 50 mg, Oral, Daily  pantoprazole, 40 mg, Oral, Daily  rOPINIRole, 0.25 mg, Oral, Nightly  sertraline, 50 mg, Oral, Nightly  sildenafil, 20 mg, Oral, BID  sodium chloride, 10 mL, Intravenous, Q12H  traZODone, 25 mg, Oral, Nightly        Meds Infusions       DVT prophylaxis  Mechanical Order History:     None      Pharmalogical Order History:      Ordered     Dose Route Frequency Stop    03/31/21 2028  enoxaparin (LOVENOX) syringe 40 mg  Status:  Discontinued      40 mg  SC Every 24 Hours 03/31/21 2153    03/31/21 2156  apixaban (ELIQUIS) tablet 5 mg      5 mg PO Every 12 Hours Scheduled --                Meds PRN  •  acetaminophen **OR** acetaminophen **OR** acetaminophen  •  acetaminophen  •  aluminum-magnesium hydroxide-simethicone  •  cyclobenzaprine  •  diphenhydrAMINE  •  HYDROcodone-acetaminophen  •  ondansetron **OR** ondansetron  •  potassium chloride  •  potassium chloride  •  [COMPLETED] Insert peripheral IV **AND** sodium chloride  •  sodium chloride      Reno Roman MD  04/02/21  16:56 EDT

## 2021-04-02 NOTE — PROGRESS NOTES
Continued Stay Note   [facility]     Patient Name: Catalina Moreno  MRN: [id number]  Today's Date: 4/2/2021    Admit Date: 3/31/2021    Discharge Plan     Row Name 04/02/21 2552       Plan    Plan  Home with son to transport, verified with son.    Plan Comments  SW met with pt at the bedside to discuss transportation concerns. Pt reports that her son can pick her up today or before tomorrow evening and then he would have to leave for his job as a . CINTHYA obtained permission to talk to the son (Jordan Garcia). CINTHYA called Jordan Garcia, who states that he lives in [place] and he is available to  the pt any time that she is d/c'd. CINTHYA informed pt's son that she is not ready at this time due to pending heart cath but advised that if her heart cath came back normal, she could likely d/c this evening. Pt's son expressed understanding. CINTHYA updated MD via secure chat. CINTHYA also requested RN to keep in contact with pt's son regarding transportation needs (via secure chat).        Met with patient in room wearing PPE: mask, face shield/goggles.      Maintained distance greater than six feet and spent less than 15 minutes in the room.      FRANCE Zayas, W    Office: [phone]  Cell: [phone]  Fax: [phone]  E-mail: [email or web address]

## 2021-04-02 NOTE — PLAN OF CARE
Goal Outcome Evaluation:  Plan of Care Reviewed With: patient  Progress: improving   Pt c/o some sciatica pain in left leg but has been resting comfortably since taking pain medication. Pt denies shortness of breath and chest pain. NPO for heart cath later today. Will continue to monitor...

## 2021-04-02 NOTE — PLAN OF CARE
Goal Outcome Evaluation:   Pt complained of leg pain and PRN meds were given. Pt went down for a heart cath and they want her to stay overnight. Son was notified.

## 2021-04-03 VITALS
WEIGHT: 178.79 LBS | SYSTOLIC BLOOD PRESSURE: 120 MMHG | OXYGEN SATURATION: 94 % | HEIGHT: 64 IN | BODY MASS INDEX: 30.52 KG/M2 | RESPIRATION RATE: 18 BRPM | HEART RATE: 99 BPM | TEMPERATURE: 97.8 F | DIASTOLIC BLOOD PRESSURE: 54 MMHG

## 2021-04-03 PROBLEM — I20.0 UNSTABLE ANGINA PECTORIS (HCC): Status: RESOLVED | Noted: 2021-03-31 | Resolved: 2021-04-03

## 2021-04-03 PROBLEM — R07.9 CHEST PAIN: Status: ACTIVE | Noted: 2021-04-03

## 2021-04-03 PROBLEM — Z79.899 POLYPHARMACY: Chronic | Status: ACTIVE | Noted: 2021-04-03

## 2021-04-03 PROBLEM — R94.39 ABNORMAL STRESS TEST: Status: RESOLVED | Noted: 2021-04-01 | Resolved: 2021-04-03

## 2021-04-03 PROBLEM — R07.9 CHEST PAIN: Status: RESOLVED | Noted: 2021-03-31 | Resolved: 2021-04-03

## 2021-04-03 PROCEDURE — 99239 HOSP IP/OBS DSCHRG MGMT >30: CPT | Performed by: INTERNAL MEDICINE

## 2021-04-03 RX ORDER — GABAPENTIN 600 MG/1
600 TABLET ORAL 2 TIMES DAILY
Qty: 120 TABLET | Refills: 5
Start: 2021-04-03 | End: 2021-07-25

## 2021-04-03 RX ORDER — ATORVASTATIN CALCIUM 10 MG/1
10 TABLET, FILM COATED ORAL DAILY
Qty: 30 TABLET | Refills: 0 | Status: SHIPPED | OUTPATIENT
Start: 2021-04-03 | End: 2021-05-03

## 2021-04-03 RX ADMIN — GABAPENTIN 600 MG: 300 CAPSULE ORAL at 08:56

## 2021-04-03 RX ADMIN — HYDROCODONE BITARTRATE AND ACETAMINOPHEN 1 TABLET: 7.5; 325 TABLET ORAL at 11:11

## 2021-04-03 RX ADMIN — SILDENAFIL 20 MG: 20 TABLET, FILM COATED ORAL at 08:56

## 2021-04-03 RX ADMIN — Medication 10 ML: at 08:56

## 2021-04-03 RX ADMIN — METOPROLOL SUCCINATE 50 MG: 50 TABLET, EXTENDED RELEASE ORAL at 08:56

## 2021-04-03 RX ADMIN — APIXABAN 5 MG: 5 TABLET, FILM COATED ORAL at 08:56

## 2021-04-03 RX ADMIN — LEVOTHYROXINE SODIUM 75 MCG: 0.07 TABLET ORAL at 08:56

## 2021-04-03 RX ADMIN — PANTOPRAZOLE SODIUM 40 MG: 40 TABLET, DELAYED RELEASE ORAL at 08:56

## 2021-04-03 RX ADMIN — FAMOTIDINE 20 MG: 20 TABLET, FILM COATED ORAL at 08:56

## 2021-04-03 RX ADMIN — GABAPENTIN 600 MG: 300 CAPSULE ORAL at 11:11

## 2021-04-03 NOTE — DISCHARGE SUMMARY
Date of Admission: 3/31/2021  358/1    Date of Discharge:  4/3/2021    Length of stay:  LOS: 2 days     Patient was examined with relevant and adequate PPE keeping in mind the current coronavirus pandemic. Minimum of 10 minutes to don and doff PPE.      Presenting Problem/History of Present Illness   Present on Admission:  • (Resolved) Chest pain  • Essential hypertension  • Hypothyroidism  • Atrial fibrillation (CMS/HCC)  • Pulmonary hypertension (CMS/HCC)  • (Resolved) Unstable angina pectoris (CMS/HCC)        Hospital Course  Chief complaint:  Chief Complaint   Patient presents with   • Chest Pain       Catalina Moreno 80 y.o. female.    PCP  Anayeli Costa APRN (General)    80-year-old female presents to the ER with a chief complaint of heaviness to her chest which felt like a brick laying on it.  The patient had associated nausea and diaphoresis without associated shortness of breath.  The patient denies nausea at time of interview and is not diaphoretic, however, she continues to have chest heaviness and she states her throat feels like it is burning down into her chest.       Review of records: Stress test 1/13/2019 showed estimated ejection fraction 74%; otherwise normal Cardiolite imaging stress test; EF on echocardiogram dated 1/12/2020 1960 EF 65%         Patient admitted with chest pain.  Stress Myoview was abnormal.  Cardiology was consulted and she had a heart cath which revealed nonobstructive disease.  PCP to address polypharmacy.  I would not encourage avoidable medications such as muscle relaxants and other anticholinergic medications.    Review of Systems   Constitutional: Negative.   HENT: Negative.    Eyes: Negative.    Cardiovascular: Negative.    Respiratory: Negative.    Endocrine: Negative.    Hematologic/Lymphatic: Negative.    Skin: Negative.    Musculoskeletal: Negative.    Gastrointestinal: Negative.    Genitourinary: Negative.    Neurological: Negative.    Psychiatric/Behavioral:  Negative.    Allergic/Immunologic: Negative.    All other systems reviewed and are negative.      Family History   Problem Relation Age of Onset   • Cancer Father    • Diabetes Son    • Cancer Paternal Aunt    • Heart disease Paternal Aunt    • Cancer Paternal Uncle         Past Medical History:   Diagnosis Date   • Anxiety    • Arthritis    • Atrial fibrillation (CMS/HCC)     paroxysmal   • Broken shoulder     left-- due to fall 11-7-19 was at [facility]   • Buttock pain     rt side   • DDD (degenerative disc disease), lumbar    • Depression    • Edema     9/2020 foot   • GERD (gastroesophageal reflux disease)    • H/O fall    • Hip pain     rt   • Hypertension    • Hypothyroidism    • Insomnia    • Leg pain     rt   • Low back pain    • Neuropathy    • PONV (postoperative nausea and vomiting)    • Pulmonary hypertension (CMS/HCC)    • Radiculopathy    • Restless legs    • Spondylolisthesis    • Urinary incontinence        Past Surgical History:   Procedure Laterality Date   • BACK SURGERY  07/19/2018    PDC &  PSF L3-L5 insitu   • CHOLECYSTECTOMY     • COLONOSCOPY     • HYSTERECTOMY     • ROTATOR CUFF REPAIR Left        Social History     Socioeconomic History   • Marital status:      Spouse name: Not on file   • Number of children: Not on file   • Years of education: Not on file   • Highest education level: Not on file   Tobacco Use   • Smoking status: Never Smoker   • Smokeless tobacco: Never Used   Vaping Use   • Vaping Use: Never used   Substance and Sexual Activity   • Alcohol use: No   • Drug use: No   • Sexual activity: Defer       Vital Signs  Temp:  [97.8 °F (36.6 °C)-98.3 °F (36.8 °C)] 97.8 °F (36.6 °C)  Heart Rate:  [68-99] 99  Resp:  [11-18] 18  BP: (105-139)/(53-70) 120/54  Weight change: -0.1 kg (-3.5 oz)    Physical Exam:  Physical Exam  Vitals and nursing note reviewed.   Constitutional:       General: She is not in acute distress.     Appearance: She is well-developed. She is obese. She is not  ill-appearing, toxic-appearing or diaphoretic.   HENT:      Head: Normocephalic and atraumatic.      Right Ear: Ear canal and external ear normal.      Left Ear: Ear canal and external ear normal.      Nose: Nose normal. No congestion or rhinorrhea.      Mouth/Throat:      Mouth: Mucous membranes are moist.      Pharynx: No oropharyngeal exudate.      Comments: Edentulous  Eyes:      General: No scleral icterus.        Right eye: No discharge.         Left eye: No discharge.      Extraocular Movements: Extraocular movements intact.      Conjunctiva/sclera: Conjunctivae normal.      Pupils: Pupils are equal, round, and reactive to light.   Neck:      Thyroid: No thyromegaly.      Vascular: No carotid bruit or JVD.      Trachea: No tracheal deviation.   Cardiovascular:      Rate and Rhythm: Normal rate and regular rhythm.      Heart sounds: Normal heart sounds. No murmur heard.   No friction rub. No gallop.    Pulmonary:      Effort: Pulmonary effort is normal. No respiratory distress.      Breath sounds: Normal breath sounds. No stridor. No wheezing, rhonchi or rales.   Chest:      Chest wall: No tenderness.   Abdominal:      General: Bowel sounds are normal. There is no distension.      Palpations: Abdomen is soft. There is no mass.      Tenderness: There is no abdominal tenderness. There is no guarding or rebound.      Hernia: No hernia is present.   Musculoskeletal:         General: No swelling, tenderness, deformity or signs of injury. Normal range of motion.      Cervical back: Normal range of motion and neck supple. No rigidity. No muscular tenderness.      Right lower leg: No edema.      Left lower leg: No edema.   Lymphadenopathy:      Cervical: No cervical adenopathy.   Skin:     General: Skin is warm and dry.      Coloration: Skin is not jaundiced or pale.      Findings: No bruising, erythema or rash.   Neurological:      General: No focal deficit present.      Mental Status: She is alert and oriented to  person, place, and time. Mental status is at baseline.      Cranial Nerves: No cranial nerve deficit.      Sensory: No sensory deficit.      Motor: No weakness or abnormal muscle tone.      Coordination: Coordination normal.   Psychiatric:         Mood and Affect: Mood normal.         Behavior: Behavior normal.         Thought Content: Thought content normal.         Judgment: Judgment normal.   Reviewed, no change in above data from the prior day.          Discharge Diagnosis:     Active Hospital Problems    Diagnosis  POA   • Polypharmacy [Z79.899]  Not Applicable   • Pulmonary hypertension (CMS/HCC) [I27.20]  Yes   • Atrial fibrillation (CMS/HCC) [I48.91]  Yes   • Essential hypertension [I10]  Yes   • Hypothyroidism [E03.9]  Yes      Resolved Hospital Problems    Diagnosis Date Resolved POA   • **Chest pain [R07.9] 04/03/2021 Yes     Priority: High   • Abnormal stress test [R94.39] 04/03/2021 No   • Unstable angina pectoris (CMS/HCC) [I20.0] 04/03/2021 Yes       Estimated Creatinine Clearance: 58.8 mL/min (by C-G formula based on SCr of 0.8 mg/dL).    Discharge Disposition    Continued Care and Services - Admitted Since 3/31/2021    Coordination has not been started for this encounter.             PT Recommendation and Plan          Home or Self Care           Discharge Medications      New Medications      Instructions Start Date   atorvastatin 10 MG tablet  Commonly known as: LIPITOR   10 mg, Oral, Daily         Changes to Medications      Instructions Start Date   gabapentin 600 MG tablet  Commonly known as: NEURONTIN  What changed: when to take this   600 mg, Oral, 2 Times Daily      rOPINIRole 0.25 MG tablet  Commonly known as: REQUIP  What changed:   · how to take this  · additional instructions   TAKE 1 TABLET BY MOUTH EVERY NIGHT 1 HOUR BEFORE BEDTIME         Continue These Medications      Instructions Start Date   apixaban 5 MG tablet tablet  Commonly known as: ELIQUIS   5 mg, Oral, Every 12 Hours  Scheduled      famotidine 20 MG tablet  Commonly known as: PEPCID   20 mg, Oral, Daily      furosemide 40 MG tablet  Commonly known as: LASIX   40 mg, Oral, 2 Times Daily, Uses for leg swelling      HYDROcodone-acetaminophen 7.5-325 MG per tablet  Commonly known as: NORCO   1 tablet, Oral, 4 Times Daily PRN, DNF before 3/31/2021      levothyroxine 75 MCG tablet  Commonly known as: SYNTHROID, LEVOTHROID   75 mcg, Oral, Daily, Take preop      metoprolol succinate XL 50 MG 24 hr tablet  Commonly known as: TOPROL-XL   TAKE 1 TABLET BY MOUTH ONCE DAILY      pantoprazole 40 MG EC tablet  Commonly known as: PROTONIX   40 mg, Oral, Daily, Afternoons      sertraline 50 MG tablet  Commonly known as: ZOLOFT   50 mg, Oral, Nightly      sildenafil 20 MG tablet  Commonly known as: REVATIO   20 mg, Oral, 2 Times Daily      traZODone 50 MG tablet  Commonly known as: DESYREL   25 mg, Oral, Nightly         Stop These Medications    cyclobenzaprine 10 MG tablet  Commonly known as: FLEXERIL          Discharge medications personally reviewed by me and med rec done by me personally.  04/03/21, 10:28 AM EDT        Consults:   Consults     Date and Time Order Name Status Description    4/1/2021  2:36 PM Inpatient Cardiology Consult Completed     3/31/2021  7:57 PM Hospitalist (on-call MD unless specified) Completed           Procedures Performed:    Procedure(s):  Cardiac Catheterization/Vascular Study (N/A)        Pertinent Test Results:   Results from last 7 days   Lab Units 04/01/21  1908 03/31/21  1914   WBC 10*3/mm3 7.50 8.60   HEMOGLOBIN g/dL 11.7* 11.8*   HEMATOCRIT % 35.2 35.9   MCV fL 86.6 87.2   MCH pg 28.9 28.6   PLATELETS 10*3/mm3 292 295     Results from last 7 days   Lab Units 04/02/21  0405 04/01/21  0519 03/31/21  1914   SODIUM mmol/L 140 142 142   POTASSIUM mmol/L 3.6 3.9 3.2*   CHLORIDE mmol/L 102 103 101   CO2 mmol/L 27.0 30.0* 28.0   BUN mg/dL 12 12 11   CREATININE mg/dL 0.80 0.92 0.96   CALCIUM mg/dL 9.3 9.3 9.5    BILIRUBIN mg/dL  --   --  0.7   ALK PHOS U/L  --   --  102   ALT (SGPT) U/L  --   --  12   AST (SGOT) U/L  --   --  22   GLUCOSE mg/dL 105* 108* 122*     Lab Results   Component Value Date    CALCIUM 9.3 04/02/2021    PHOS 3.1 01/14/2019     No results found for: HGBA1C  No results found for: CHOL, CHLPL, TRIG, HDL, LDL, LDLDIRECT  Lab Results   Component Value Date    LIPASE 20 (L) 02/11/2019         Pathology  Lab Results   Lab Value Date/Time    FINALDX  09/04/2019 1428     Polyp, ascending, polypectomy:    Tubular adenoma     YANY/tkd        Inflammatory Biomarkers        Invalid input(s): ESR, D-DIMER QUANTITATIVE,  PROCALCITONIN  COVID19   Date Value Ref Range Status   04/01/2021 Not Detected Not Detected - Ref. Range Final        Microbiology Results (last 10 days)     Procedure Component Value - Date/Time    COVID PRE-OP / PRE-PROCEDURE SCREENING ORDER (NO ISOLATION) - Swab, Nasopharynx [[id number]]  (Normal) Collected: 04/01/21 0013    Lab Status: Final result Specimen: Swab from Nasopharynx Updated: 04/01/21 0201    Narrative:      The following orders were created for panel order COVID PRE-OP / PRE-PROCEDURE SCREENING ORDER (NO ISOLATION) - Swab, Nasopharynx.  Procedure                               Abnormality         Status                     ---------                               -----------         ------                     COVID-19,CEPHEID,COR/KEVEN...[[id number]]  Normal              Final result                 Please view results for these tests on the individual orders.    COVID-19,CEPHEID,COR/KEVEN/PAD IN-HOUSE(OR EMERGENT/ADD-ON),NP SWAB IN TRANSPORT MEDIA 3-4 HR TAT, RT-PCR - Swab, Nasopharynx [[id number]]  (Normal) Collected: 04/01/21 0013    Lab Status: Final result Specimen: Swab from Nasopharynx Updated: 04/01/21 0201     COVID19 Not Detected    Narrative:      Fact sheet for providers: https://www.fda.gov/media/965244/download     Fact sheet for patients:  https://www.fda.gov/media/901180/download          ECG/EMG Results (most recent)     Procedure Component Value Units Date/Time    ECG 12 Lead [[id number]] Collected: 03/31/21 1907     Updated: 04/01/21 1710     QT Interval 353 ms     Narrative:      HEART RATE= 106  bpm  RR Interval= 584  ms  NC Interval= 110  ms  P Horizontal Axis= 117  deg  P Front Axis= 60  deg  QRSD Interval= 74  ms  QT Interval= 353  ms  QRS Axis= 65  deg  T Wave Axis= 23  deg  - BORDERLINE ECG -  Sinus tachycardia with irregular rate  When compared with ECG of 02-Nov-2020 19:17:01,  No significant change  Electronically Signed By: Mark Camara ([facility]) 01-Apr-2021 17:09:37  Date and Time of Study: 2021-03-31 19:07:35    ECG 12 Lead [[id number]] Collected: 04/01/21 0220     Updated: 04/02/21 0620     QT Interval 382 ms     Narrative:      HEART RATE= 81  bpm  RR Interval= 736  ms  NC Interval= 179  ms  P Horizontal Axis= 26  deg  P Front Axis= 70  deg  QRSD Interval= 72  ms  QT Interval= 382  ms  QRS Axis= 50  deg  T Wave Axis= 59  deg  - NORMAL ECG -  Sinus rhythm  When compared with ECG of 31-Mar-2021 19:07:35,  Significant rate decrease  Electronically Signed By: Elder Fernández ([facility]) 02-Apr-2021 06:19:47  Date and Time of Study: 2021-04-01 02:20:35          Results for orders placed in visit on 07/15/19    SCANNED VASCULAR STUDIES      Results for orders placed in visit on 06/24/20    SCANNED - ECHOCARDIOGRAM      XR Chest 1 View    Result Date: 3/31/2021  1. Stable exam, with mild chronic change in the left lower lung. No evidence of acute disease.  Electronically Signed By-Maia Finney MD On:3/31/2021 7:48 PM This report was finalized on [id number] by  Maia Finney MD.      Xrays, labs reviewed personally by me.  04/03/21  10:28 AM EDT      Condition on Discharge:    Stable    Discharge Diet:   Dietary Orders (From admission, onward)     Start     Ordered    04/02/21 1643  Diet Cardiac; Healthy Heart  Diet Effective Now     Question  Answer Comment   Diet / Texture / Consistency Cardiac    Select Type: Healthy Heart        04/02/21 1642                Activity at Discharge:   Activity Instructions     Activity as Tolerated            Follow-up Appointments    Future Appointments   Date Time Provider Department Center   5/18/2021  1:50 PM Shefali Worthy MD [facility]       Additional Instructions for the Follow-ups that You Need to Schedule     Discharge Follow-up with PCP   As directed       Currently Documented PCP:    Anayeli Costa APRN    PCP Phone Number:    [phone]     Follow Up Details: If no PCP, call MD finder at [phone]           Follow-up Information     Barrett Evans MD Follow up.    Specialty: Cardiology  Contact information:  [address] [facility] [place] [address] [phone]             Anayeli Costa APRN .    Specialty: Family Medicine  Why: If no PCP, call MD finder at [phone]  Contact information:  [address]  [address]  [place] [address] [phone]                    Test Results Pending at Discharge       Risk for Readmission (LACE) Score: 13 (4/3/2021  6:01 AM)        Time: I spent  more than 35  minutes on this discharge activity which included: face-to-face encounter with the patient, reviewing the data in the system, coordination of the care with the nursing staff as well as consultants, documentation, and entering orders.   Care coordination with nursing and case management for discharge planning        Reno Roman MD  04/03/21  10:41 EDT

## 2021-04-03 NOTE — PLAN OF CARE
Goal Outcome Evaluation:  Plan of Care Reviewed With: patient  Progress: improving  Outcome Summary: Patient has rested well overnight. She did c/o leg pain/spasms. I gave her PRN pain medicine and muscle relaxer. She is expected to discharge today.

## 2021-04-04 ENCOUNTER — READMISSION MANAGEMENT (OUTPATIENT)
Dept: CALL CENTER | Facility: HOSPITAL | Age: 81
End: 2021-04-04

## 2021-04-04 NOTE — OUTREACH NOTE
Prep Survey      Responses   [facility] facility patient discharged from?  [facility]   Is LACE score < 7 ?  No   Emergency Room discharge w/ pulse ox?  No   Eligibility  Readm Mgmt   Discharge diagnosis  Chest pain [s/p cardiac catheterization/vascular study]   Does the patient have one of the following disease processes/diagnoses(primary or secondary)?  Other   Does the patient have Home health ordered?  No   Is there a DME ordered?  No   Comments regarding appointments  Needs f/u scheduled   Medication alerts for this patient  Continue Eliquis.  Meds per AVS.   Prep survey completed?  Yes          Lydia Mar RN

## 2021-04-05 NOTE — PROGRESS NOTES
Discharge Planning Assessment  [facility]     Patient Name: Catalina Moreno  MRN: [id number]  Today's Date: 4/5/2021    Admit Date: 3/31/2021          Plan    Final Discharge Disposition Code  01 - home or self-care    Final Note  return home       Carol naegele rn  Case management  Office number [phone]  Cell phone [phone]

## 2021-04-06 ENCOUNTER — READMISSION MANAGEMENT (OUTPATIENT)
Dept: CALL CENTER | Facility: HOSPITAL | Age: 81
End: 2021-04-06

## 2021-04-06 NOTE — OUTREACH NOTE
Medical Week 1 Survey      Responses   [facility] patient discharged from?  [facility]   Does the patient have one of the following disease processes/diagnoses(primary or secondary)?  Other   Week 1 attempt successful?  No   Unsuccessful attempts  Attempt 1 [ATTEMPTED PATIENT'S CELL PHONE, HOME PHONE, AND SON'S PHONE WITH NO ANSWER. ]          Yuki Templeton LPN

## 2021-04-07 ENCOUNTER — READMISSION MANAGEMENT (OUTPATIENT)
Dept: CALL CENTER | Facility: HOSPITAL | Age: 81
End: 2021-04-07

## 2021-04-07 NOTE — OUTREACH NOTE
Medical Week 1 Survey      Responses   [facility] patient discharged from?  [facility]   Does the patient have one of the following disease processes/diagnoses(primary or secondary)?  Other   Week 1 attempt successful?  No   Unsuccessful attempts  Attempt 2 [ATTEMPTED ALL NUMBERS ON CHART WITH NO ANSWER. ]          Yuki Templeton LPN

## 2021-04-07 NOTE — PAYOR COMM NOTE
"CLINICALS AS REQUESTED:      Catalina Carrera (80 y.o. Female) 04/28/1946  PENDING AUTH # [id number]        AUTHORIZATION PENDING:   PLEASE CALL OR FAX DETERMINATION TO CONTACT BELOW. THANK YOU.        Sarahi Trammell, RN MSN  /UR  [facility]  [phone] office  [phone] fax  [email or web address]    [facility]  NPI: [id number]  Tax: 410-        Catalina Carrera (80 y.o. Female)     Date of Birth Social Security Number Address Home Phone MRN    1940  [id number] [address] [phone] [id number]    Orthodoxy Marital Status          None        Admission Date Admission Type Admitting Provider Attending Provider Department, Room/Bed    3/31/21 Emergency Reno Roman MD  [facility] 3C MEDICAL INPATIENT, 358/1    Discharge Date Discharge Disposition Discharge Destination        4/3/2021 Home or Self Care              Attending Provider: (none)   Allergies: Baclofen, Codeine, Ibuprofen, Methocarbamol, Naproxen, Tizanidine Hcl, Tolmetin    Isolation: None   Infection: None   Code Status: Prior    Ht: 162.6 cm (64\")   Wt: 81.1 kg (178 lb 12.7 oz)    Admission Cmt: None   Principal Problem: Chest pain [R07.9]                 Active Insurance as of 3/31/2021     Primary Coverage     Payor Plan Insurance Group Employer/Plan Group    HUMANA MEDICARE REPLACEMENT HUMANA MEDICARE REPLACEMENT [id number]     Payor Plan Address Payor Plan Phone Number Payor Plan Fax Number Effective Dates    [address] [address] [phone]  1/1/2019 - None Entered    Formerly [facility] [address]       Subscriber Name Subscriber Birth Date Member ID       CATALINA CARRERA 1940 [id number]                 Emergency Contacts      (Rel.) Home Phone Work Phone Mobile Phone    MADHAV DEAN (Son) -- -- [phone]               History & Physical      Bibiana Barajas FNP at 03/31/21 2029     Attestation signed by Reno Roman MD at 04/01/21 1106        Agree " with above mentioned except my evaluation, assessment, plan and treatment supercedes that of ARNP or PA.      Electronically signed by Reno Roman MD, 21, 11:06 AM EDT.            Summary: chest pain               [facility]      Patient Name: Catalina Moreno  : 1940  MRN: [id number]  Primary Care Physician: Anayeli Costa APRN  Date of admission: 3/31/2021    Patient Care Team:  Anayeli Costa APRN as PCP - General (Family Medicine)  Barrett Evans MD as Consulting Physician (Cardiology)          Subjective   History Present Illness     Chief Complaint:   Chief Complaint   Patient presents with   • Chest Pain       Ms. Moreno is a 80 y.o.  presents to [facility] complaining of chest pain.         80-year-old female presents to the ER with a chief complaint of heaviness to her chest which felt like a brick laying on it.  The patient had associated nausea and diaphoresis without associated shortness of breath.  The patient denies nausea at time of interview and is not diaphoretic, however, she continues to have chest heaviness and she states her throat feels like it is burning down into her chest.      Review of records: Stress test 2019 showed estimated ejection fraction 74%; otherwise normal Cardiolite imaging stress test; EF on echocardiogram dated 2020 1960 EF 65%        Review of Systems   Constitutional: Positive for diaphoresis. Negative for chills and fever.   HENT: Positive for sore throat.    Cardiovascular: Positive for chest pain. Negative for leg swelling.   Respiratory: Negative for shortness of breath.    All other systems reviewed and are negative.          Personal History     Past Medical History:   Past Medical History:   Diagnosis Date   • Anxiety    • Arthritis    • Atrial fibrillation (CMS/HCC)     paroxysmal   • Broken shoulder     left-- due to fall 19 was at [facility]   • Buttock pain     rt side   •  DDD (degenerative disc disease), lumbar    • Depression    • Edema     9/2020 foot   • GERD (gastroesophageal reflux disease)    • H/O fall    • Hip pain     rt   • Hypertension    • Hypothyroidism    • Insomnia    • Leg pain     rt   • Low back pain    • Neuropathy    • PONV (postoperative nausea and vomiting)    • Pulmonary hypertension (CMS/HCC)    • Radiculopathy    • Restless legs    • Spondylolisthesis    • Urinary incontinence        Surgical History:      Past Surgical History:   Procedure Laterality Date   • BACK SURGERY  07/19/2018    PDC &  PSF L3-L5 insitu   • CHOLECYSTECTOMY     • COLONOSCOPY     • HYSTERECTOMY     • ROTATOR CUFF REPAIR Left            Family History: family history includes Cancer in her father, paternal aunt, and paternal uncle; Diabetes in her son; Heart disease in her paternal aunt. Otherwise pertinent FHx was reviewed and unremarkable.     Social History:  reports that she has never smoked. She has never used smokeless tobacco. She reports that she does not drink alcohol and does not use drugs.      Medications:  Prior to Admission medications    Medication Sig Start Date End Date Taking? Authorizing Provider   apixaban (ELIQUIS) 5 MG tablet tablet Take 1 tablet by mouth Every 12 (Twelve) Hours.  Patient taking differently: Take 5 mg by mouth Every 12 (Twelve) Hours. Last dose 10/9 am 8/17/20   Barrett Evans MD   cyclobenzaprine (FLEXERIL) 10 MG tablet Take 1 tablet by mouth 3 (Three) Times a Day As Needed for Muscle Spasms. 9/16/20   More Gamble APRN   famotidine (PEPCID) 20 MG tablet Take 20 mg by mouth 2 (Two) Times a Day. 6/19/19   Elton Flores MD   furosemide (LASIX) 40 MG tablet Take 40 mg by mouth 2 (Two) Times a Day. Uses for leg swelling 3/13/21   Elton Flores MD   gabapentin (NEURONTIN) 600 MG tablet Take 1 tablet by mouth 4 (Four) Times a Day.  Patient taking differently: Take 600 mg by mouth 4 (Four) Times a Day As Needed. Take preop  7/31/20   Shefali Worthy MD   HYDROcodone-acetaminophen (NORCO) 7.5-325 MG per tablet Take 1 tablet by mouth 4 (Four) Times a Day As Needed for Severe Pain . DNF before 3/31/2021 3/31/21   Shefali Worthy MD   HYDROcodone-acetaminophen (NORCO) 7.5-325 MG per tablet Take 1 tablet by mouth 4 (Four) Times a Day As Needed for Severe Pain . DNF before 4/30/2021 4/30/21   Shefali Worthy MD   levothyroxine (SYNTHROID, LEVOTHROID) 75 MCG tablet Take 75 mcg by mouth Daily. Take preop 6/24/19   Elton Flores MD   lidocaine (LIDODERM) 5 % Place 2 patches on the skin as directed by provider Daily. Remove & Discard patch within 12 hours or as directed by MD  Patient taking differently: Place 2 patches on the skin as directed by provider Daily. Remove & Discard patch within 12 hours or as directed by MD  Back or knee  dont use preop 6/9/20   Shefali Worthy MD   methocarbamol (ROBAXIN) 500 MG tablet Take 500 mg by mouth 2 (Two) Times a Day. 5/2/20   Elton Flores MD   metoprolol succinate XL (TOPROL-XL) 50 MG 24 hr tablet TAKE 1 TABLET BY MOUTH ONCE DAILY 11/30/20   Barrett Evasn MD   pantoprazole (PROTONIX) 40 MG EC tablet Take 40 mg by mouth Daily. Afternoons 6/24/19   Elton Flores MD   rOPINIRole (REQUIP) 0.25 MG tablet TAKE 1 TABLET BY MOUTH EVERY NIGHT 1 HOUR BEFORE BEDTIME  Patient taking differently: 0.25 mg Every Night. 10/6/20   Shefali Worthy MD   sertraline (ZOLOFT) 50 MG tablet Take 50 mg by mouth Every Night. 6/4/20   Elton Flores MD   sildenafil (REVATIO) 20 MG tablet Take 20 mg by mouth 2 (Two) Times a Day. 2/20/21   Elton Flores MD   traZODone (DESYREL) 50 MG tablet Take 25 mg by mouth Every Night. 6/4/20   Elton Flores MD       Allergies:    Allergies   Allergen Reactions   • Baclofen Rash   • Codeine Nausea Only   • Ibuprofen Unknown (See Comments)     Patient doesn't know----Motin   • Methocarbamol Unknown (See Comments)      Patient doesn't know   • Naproxen Unknown (See Comments)     Pt. Doesn't know    • Tizanidine Hcl Hallucinations   • Tolmetin Dizziness     Pt. Doesn't know-- same as Tolectin       Objective   Objective     Vital Signs  Temp:  [98.4 °F (36.9 °C)] 98.4 °F (36.9 °C)  Heart Rate:  [] 93  Resp:  [16] 16  BP: (137-164)/(48-71) 137/48  SpO2:  [94 %-98 %] 98 %  on   ;   Device (Oxygen Therapy): room air  Body mass index is 30.9 kg/m².    Physical Exam  Vitals and nursing note reviewed.   Constitutional:       General: She is not in acute distress.     Appearance: She is not ill-appearing, toxic-appearing or diaphoretic.   HENT:      Head: Normocephalic and atraumatic.      Right Ear: External ear normal.      Nose: Nose normal. No congestion.      Mouth/Throat:      Mouth: Mucous membranes are moist.      Pharynx: No oropharyngeal exudate or posterior oropharyngeal erythema.   Eyes:      General: No scleral icterus.        Right eye: No discharge.         Left eye: No discharge.      Extraocular Movements: Extraocular movements intact.      Conjunctiva/sclera: Conjunctivae normal.      Pupils: Pupils are equal, round, and reactive to light.   Cardiovascular:      Rate and Rhythm: Rhythm irregular.      Comments: Patient on bedside monitor looks to have a baseline sinus rhythm with intermittent runs of atrial fibrillation  Pulmonary:      Effort: Pulmonary effort is normal.      Breath sounds: Normal breath sounds.   Abdominal:      General: Bowel sounds are normal. There is no distension.      Palpations: Abdomen is soft.   Skin:     Capillary Refill: Capillary refill takes less than 2 seconds.   Neurological:      General: No focal deficit present.      Mental Status: She is alert and oriented to person, place, and time.   Psychiatric:         Mood and Affect: Mood normal.         Behavior: Behavior normal.         Thought Content: Thought content normal.         Judgment: Judgment normal.       Results Review:  I  have personally reviewed most recent cardiac tracings, lab results and radiology images and interpretations and agree with findings.    Results from last 7 days   Lab Units 03/31/21  1914   WBC 10*3/mm3 8.60   HEMOGLOBIN g/dL 11.8*   HEMATOCRIT % 35.9   PLATELETS 10*3/mm3 295     Results from last 7 days   Lab Units 03/31/21  1914   SODIUM mmol/L 142   POTASSIUM mmol/L 3.2*   CHLORIDE mmol/L 101   CO2 mmol/L 28.0   BUN mg/dL 11   CREATININE mg/dL 0.96   GLUCOSE mg/dL 122*   CALCIUM mg/dL 9.5   ALT (SGPT) U/L 12   AST (SGOT) U/L 22   TROPONIN T ng/mL <0.010   PROBNP pg/mL 695.9     Estimated Creatinine Clearance: 49.1 mL/min (by C-G formula based on SCr of 0.96 mg/dL).  Brief Urine Lab Results  (Last result in the past 365 days)      Color   Clarity   Blood   Leuk Est   Nitrite   Protein   CREAT   Urine HCG        11/02/20 2015 Yellow Cloudy  Comment:  Result checked  Negative Trace Negative Negative               Microbiology Results (last 10 days)     ** No results found for the last 240 hours. **        I have personally reviewed the EKG and noted sinus rhythm with occasional PAC, when reviewing bedside monitor the patient looks to be in baseline sinus rhythm with intermittent runs of atrial fibrillation.  ECG/EMG Results (most recent)     Procedure Component Value Units Date/Time    ECG 12 Lead [[id number]] Collected: 03/31/21 1907     Updated: 03/31/21 1908     QT Interval 353 ms     Narrative:      HEART RATE= 106  bpm  RR Interval= 584  ms  SD Interval= 110  ms  P Horizontal Axis= 117  deg  P Front Axis= 60  deg  QRSD Interval= 74  ms  QT Interval= 353  ms  QRS Axis= 65  deg  T Wave Axis= 23  deg  - BORDERLINE ECG -  Sinus tachycardia with irregular rate  When compared with ECG of 02-Nov-2020 19:17:01,  No significant change  Electronically Signed By:   Date and Time of Study: 2021-03-31 19:07:35          Results for orders placed in visit on 07/15/19    SCANNED VASCULAR STUDIES      Results for orders placed  in visit on 06/24/20    SCANNED - ECHOCARDIOGRAM      XR Chest 1 View    Result Date: 3/31/2021  1. Stable exam, with mild chronic change in the left lower lung. No evidence of acute disease.  Electronically Signed By-Maia Finney MD On:3/31/2021 7:48 PM This report was finalized on [id number] by  Maia Finney MD.        Estimated Creatinine Clearance: 49.1 mL/min (by C-G formula based on SCr of 0.96 mg/dL).    Assessment/Plan   Assessment/Plan       Active Hospital Problems    Diagnosis  POA   • Chest pain [R07.9]  Yes      Resolved Hospital Problems   No resolved problems to display.     Chest pressure with burning sensation in throat: GI cocktail; serial troponin; stress Myoview in a.m.    --Patient is noted to be on both Pepcid and Protonix for GERD    Hypokalemia, acute, mild with potassium 3.2--likely secondary to diuretic therapy: Add potassium replacement protocol; hold Lasix    Atrial fibrillation with intermittent sinus rhythm, chronic: Continue apixaban; continue metoprolol    Pulmonary hypertension, chronic: Continue Revatio    Hypertension, chronic, uncontrolled: Continue metoprolol; hold Lasix    Chronic back pain: Continue Flexeril; continue gabapentin; continue hydrocodone    CKD stage 2, chronic, creatinine 0.96: monitor BMP; hold lasix     GERD, chronic: Continue Pepcid; Protonix    Hypothyroidism, chronic: Continue levothyroxine    Hypertension, chronic: Continue metoprolol    Anxiety, with insomnia, chronic: Continue Zoloft; continue trazodone     RLS, chronic: Continue Requip     VTE Prophylaxis -apixaban   Mechanical Order History:     None      Pharmalogical Order History:      Ordered     Dose Route Frequency Stop    03/31/21 2028      --                CODE STATUS:    Code Status and Medical Interventions:   Ordered at: 03/31/21 2028     Code Status:    CPR     Medical Interventions (Level of Support Prior to Arrest):    Full       This patient has been examined wearing appropriate  Personal Protective Equipment. 03/31/21      I discussed the patient's findings and my recommendations with patient.      Signature:Electronically signed by LAST Pastrana, 04/01/21, 2:17 AM EDT.    [facility] Hospitalist Team          Electronically signed by Reno Roman MD at 04/01/21 1106          Emergency Department Notes      Mark Camara MD at 03/31/21 4798          Subjective   Chief complaint chest pain    History of present illness 80-year-old female who states that she has been feeling kind of drained the last 4 days today she had gotten up and was walking out the door when she developed severe chest pain pressure felt like cinderblocks on her chest nausea and some sweating.  There was no shortness of breath neck arm or jaw pain.  EMS was called by the time they got there her pain was too much better and lasted less than an hour.  EMS was given an aspirin prior to arrival as well as nitroglycerin she states feels 100% better currently.  No leg pain or swelling no recent long car ride plane or immobilization or foreign travels.  The patient has no history of stents or angioplasty but does have a history of atrial fibrillation.  No recent cardiac evaluation.  Symptoms started an hour or so prior to arrival severe nonradiating resolved by ER presentation nitroglycerin and aspirin given associate with some nausea a little bit of sweating and general weakness          Review of Systems   Constitutional: Negative for chills and fever.   HENT: Negative for congestion and sinus pressure.    Eyes: Negative for photophobia and visual disturbance.   Respiratory: Positive for chest tightness. Negative for shortness of breath.    Cardiovascular: Positive for chest pain. Negative for leg swelling.   Gastrointestinal: Positive for nausea. Negative for abdominal pain and vomiting.   Endocrine: Negative for cold intolerance and heat intolerance.   Genitourinary: Negative for difficulty urinating and dysuria.    Musculoskeletal: Positive for arthralgias. Negative for back pain.   Skin: Negative for color change and pallor.   Neurological: Negative for dizziness and light-headedness.   Psychiatric/Behavioral: Negative for agitation and behavioral problems.       Past Medical History:   Diagnosis Date   • Anxiety    • Arthritis    • Atrial fibrillation (CMS/HCC)     paroxysmal   • Broken shoulder     left-- due to fall 11-7-19 was at [facility]   • Buttock pain     rt side   • DDD (degenerative disc disease), lumbar    • Depression    • Edema     9/2020 foot   • GERD (gastroesophageal reflux disease)    • H/O fall    • Hip pain     rt   • Hypertension    • Hypothyroidism    • Insomnia    • Leg pain     rt   • Low back pain    • Neuropathy    • PONV (postoperative nausea and vomiting)    • Pulmonary hypertension (CMS/HCC)    • Radiculopathy    • Restless legs    • Spondylolisthesis    • Urinary incontinence        Allergies   Allergen Reactions   • Baclofen Rash   • Codeine Nausea Only   • Ibuprofen Unknown (See Comments)     Patient doesn't know----Motin   • Methocarbamol Unknown (See Comments)     Patient doesn't know   • Naproxen Unknown (See Comments)     Pt. Doesn't know    • Tizanidine Hcl Hallucinations   • Tolmetin Dizziness     Pt. Doesn't know-- same as Tolectin       Past Surgical History:   Procedure Laterality Date   • BACK SURGERY  07/19/2018    PDC &  PSF L3-L5 insitu   • CHOLECYSTECTOMY     • COLONOSCOPY     • HYSTERECTOMY     • ROTATOR CUFF REPAIR Left        Family History   Problem Relation Age of Onset   • Cancer Father    • Diabetes Son    • Cancer Paternal Aunt    • Heart disease Paternal Aunt    • Cancer Paternal Uncle        Social History     Socioeconomic History   • Marital status:      Spouse name: Not on file   • Number of children: Not on file   • Years of education: Not on file   • Highest education level: Not on file   Tobacco Use   • Smoking status: Never Smoker   • Smokeless tobacco: Never  Used   Vaping Use   • Vaping Use: Never used   Substance and Sexual Activity   • Alcohol use: No   • Drug use: No   • Sexual activity: Defer     Prior to Admission medications    Medication Sig Start Date End Date Taking? Authorizing Provider   apixaban (ELIQUIS) 5 MG tablet tablet Take 1 tablet by mouth Every 12 (Twelve) Hours.  Patient taking differently: Take 5 mg by mouth Every 12 (Twelve) Hours. Last dose 10/9 am 8/17/20   Barrett Evans MD   cyclobenzaprine (FLEXERIL) 10 MG tablet Take 1 tablet by mouth 3 (Three) Times a Day As Needed for Muscle Spasms. 9/16/20   More Gamble APRN   famotidine (PEPCID) 20 MG tablet Take 20 mg by mouth 2 (Two) Times a Day. 6/19/19   Elton Flores MD   furosemide (LASIX) 40 MG tablet Take 40 mg by mouth 2 (Two) Times a Day. Uses for leg swelling 3/13/21   Elton Florse MD   gabapentin (NEURONTIN) 600 MG tablet Take 1 tablet by mouth 4 (Four) Times a Day.  Patient taking differently: Take 600 mg by mouth 4 (Four) Times a Day As Needed. Take preop 7/31/20   Shefali Worthy MD   HYDROcodone-acetaminophen (NORCO) 7.5-325 MG per tablet Take 1 tablet by mouth 4 (Four) Times a Day As Needed for Severe Pain . DNF before 3/31/2021 3/31/21   Shefali Worthy MD   HYDROcodone-acetaminophen (NORCO) 7.5-325 MG per tablet Take 1 tablet by mouth 4 (Four) Times a Day As Needed for Severe Pain . DNF before 4/30/2021 4/30/21   Shefali Worthy MD   levothyroxine (SYNTHROID, LEVOTHROID) 75 MCG tablet Take 75 mcg by mouth Daily. Take preop 6/24/19   Elton Flores MD   lidocaine (LIDODERM) 5 % Place 2 patches on the skin as directed by provider Daily. Remove & Discard patch within 12 hours or as directed by MD  Patient taking differently: Place 2 patches on the skin as directed by provider Daily. Remove & Discard patch within 12 hours or as directed by MD  Back or knee  dont use preop 6/9/20   Shefali Worthy MD   methocarbamol (ROBAXIN) 500 MG  tablet Take 500 mg by mouth 2 (Two) Times a Day. 5/2/20   Elton Flores MD   metoprolol succinate XL (TOPROL-XL) 50 MG 24 hr tablet TAKE 1 TABLET BY MOUTH ONCE DAILY 11/30/20   Barrett Evans MD   pantoprazole (PROTONIX) 40 MG EC tablet Take 40 mg by mouth Daily. Afternoons 6/24/19   Elton Flores MD   rOPINIRole (REQUIP) 0.25 MG tablet TAKE 1 TABLET BY MOUTH EVERY NIGHT 1 HOUR BEFORE BEDTIME  Patient taking differently: 0.25 mg Every Night. 10/6/20   Shefali Worthy MD   sertraline (ZOLOFT) 50 MG tablet Take 50 mg by mouth Every Night. 6/4/20   Elton Flores MD   sildenafil (REVATIO) 20 MG tablet Take 20 mg by mouth 2 (Two) Times a Day. 2/20/21   Elton Flores MD   traZODone (DESYREL) 50 MG tablet Take 25 mg by mouth Every Night. 6/4/20   Elton Flores MD           Objective   Physical Exam  80-year-old no acute distress resting comfortably.  HEENT extraocular muscles intact pupils equal round react there is no photophobia mouth clear  Neck supple no adenopathy no meningeal signs no JVD no bruits  Lungs clear no retraction  Heart regular without murmur rub  Abdomen soft without tenderness no pulsatile masses  Extremities pulses equal upper and lower extremities no edema cords or Homans' sign or evidence of DVT.  Skin warm dry no rash no cellulitic change  Neurologic awake alert follows commands no facial symmetry normal speech no focal weakness  Procedures          ED Course      Results for orders placed or performed during the hospital encounter of 03/31/21   Comprehensive Metabolic Panel    Specimen: Blood   Result Value Ref Range    Glucose 122 (H) 65 - 99 mg/dL    BUN 11 8 - 23 mg/dL    Creatinine 0.96 0.57 - 1.00 mg/dL    Sodium 142 136 - 145 mmol/L    Potassium 3.2 (L) 3.5 - 5.2 mmol/L    Chloride 101 98 - 107 mmol/L    CO2 28.0 22.0 - 29.0 mmol/L    Calcium 9.5 8.6 - 10.5 mg/dL    Total Protein 8.1 6.0 - 8.5 g/dL    Albumin 4.50 3.50 - 5.20 g/dL     ALT (SGPT) 12 1 - 33 U/L    AST (SGOT) 22 1 - 32 U/L    Alkaline Phosphatase 102 39 - 117 U/L    Total Bilirubin 0.7 0.0 - 1.2 mg/dL    eGFR Non African Amer 56 (L) >60 mL/min/1.73    Globulin 3.6 gm/dL    A/G Ratio 1.3 g/dL    BUN/Creatinine Ratio 11.5 7.0 - 25.0    Anion Gap 13.0 5.0 - 15.0 mmol/L   Troponin    Specimen: Blood   Result Value Ref Range    Troponin T <0.010 0.000 - 0.030 ng/mL   BNP    Specimen: Blood   Result Value Ref Range    proBNP 695.9 0.0-1,800.0 pg/mL   CBC Auto Differential    Specimen: Blood   Result Value Ref Range    WBC 8.60 3.40 - 10.80 10*3/mm3    RBC 4.12 3.77 - 5.28 10*6/mm3    Hemoglobin 11.8 (L) 12.0 - 15.9 g/dL    Hematocrit 35.9 34.0 - 46.6 %    MCV 87.2 79.0 - 97.0 fL    MCH 28.6 26.6 - 33.0 pg    MCHC 32.8 31.5 - 35.7 g/dL    RDW 15.0 12.3 - 15.4 %    RDW-SD 45.1 37.0 - 54.0 fl    MPV 7.3 6.0 - 12.0 fL    Platelets 295 140 - 450 10*3/mm3    Neutrophil % 64.2 42.7 - 76.0 %    Lymphocyte % 22.9 19.6 - 45.3 %    Monocyte % 10.4 5.0 - 12.0 %    Eosinophil % 1.4 0.3 - 6.2 %    Basophil % 1.1 0.0 - 1.5 %    Neutrophils, Absolute 5.50 1.70 - 7.00 10*3/mm3    Lymphocytes, Absolute 2.00 0.70 - 3.10 10*3/mm3    Monocytes, Absolute 0.90 0.10 - 0.90 10*3/mm3    Eosinophils, Absolute 0.10 0.00 - 0.40 10*3/mm3    Basophils, Absolute 0.10 0.00 - 0.20 10*3/mm3    nRBC 0.0 0.0 - 0.2 /100 WBC   ECG 12 Lead   Result Value Ref Range    QT Interval 353 ms     XR Chest 1 View    Result Date: 3/31/2021  1. Stable exam, with mild chronic change in the left lower lung. No evidence of acute disease.  Electronically Signed By-Maia Finney MD On:3/31/2021 7:48 PM This report was finalized on [id number] by  Maia Finney MD.    Medications   sodium chloride 0.9 % flush 10 mL (has no administration in time range)   metoprolol tartrate (LOPRESSOR) injection 5 mg (5 mg Intravenous Given 3/31/21 1917)   nitroglycerin (NITROSTAT) ointment 1 inch (1 inch Topical Given 3/31/21 2005)     EKG my  interpretation sinus tachycardia rate of 105 normal axis no hypertrophy patient has lots of artifact on EKG nonspecific diffuse changes no change from previous abnormal                                       MDM  Number of Diagnoses or Management Options  Chest pain, unspecified type: new and requires workup  Diagnosis management comments: Medical decision making.  Patient IV established placed on a cardiac monitor showed a sinus rhythm without acute changes slight tachycardic.  The patient had been given aspirin and nitroglycerin prior to arrival had 1 inch of Nitropaste placed.  Patient given Lopressor 5 mg IV EKG showed a sinus rhythm without acute changes noted.  The patient had CBC and electrolytes and her troponin was negative chest x-ray without acute findings potassium was noted to be 3.2 blood sugar 122.  Patient given 40 mg potassium p.o. on repeat exam she is resting company remained pain-free.  Patient does have several risk factors for heart disease she states this felt like a load of cinderblocks on her chest.  It is now resolved I do not suspect an acute PE or pulmonary embolism by clinical exam and she is already on Eliquis.  No evidence of suggest acute aortic dissection.  No evidence of sepsis noted at this point.  No evidence of acute myocardial infarction.  She made aware of the findings and should be placed in the hospital hospitalist nurse practitioner notified stable unremarkable improved ER course       Amount and/or Complexity of Data Reviewed  Clinical lab tests: reviewed  Tests in the radiology section of CPT®: reviewed  Discuss the patient with other providers: yes    Risk of Complications, Morbidity, and/or Mortality  Presenting problems: high  Diagnostic procedures: high  Management options: high    Patient Progress  Patient progress: stable      Final diagnoses:   Chest pain, unspecified type       ED Disposition  ED Disposition     ED Disposition Condition Comment    Decision to Admit   Level of Care: Telemetry [5]   Diagnosis: Chest pain, unspecified type [[id number]]   Admitting Physician: RENO LIMA [[id number]]   Attending Physician: RENO LIMA [[id number]]            No follow-up provider specified.       Medication List      No changes were made to your prescriptions during this visit.          Mark Camara MD  03/31/21 2009      Electronically signed by Mark Camara MD at 03/31/21 2009          Physician Progress Notes (all)      Reno Lima MD at 04/02/21 1656                [facility]  INPATIENT PROGRESS NOTE  Pool/OPCV   Hospitalist Team  LOS 1 days      Patient Care Team:  Anayeli Costa APRN as PCP - General (Family Medicine)  Barrett Evans MD as Consulting Physician (Cardiology)        Chief Complaint / Subjective  Chief Complaint   Patient presents with   • Chest Pain       HPI (4 hpi elements or status of 3 chronic)  80-year-old female presents to the ER with a chief complaint of heaviness to her chest which felt like a brick laying on it.  The patient had associated nausea and diaphoresis without associated shortness of breath.  The patient denies nausea at time of interview and is not diaphoretic, however, she continues to have chest heaviness and she states her throat feels like it is burning down into her chest.       Review of records: Stress test 1/13/2019 showed estimated ejection fraction 74%; otherwise normal Cardiolite imaging stress test; EF on echocardiogram dated 1/12/2020 1960 EF 65%     Status post heart cath 04/02/21, 4:56 PM EDT      Chest Pain           History taken from: patient chart    Review of Systems   Constitutional: Negative.   HENT: Negative.    Eyes: Negative.    Cardiovascular: Negative.    Respiratory: Negative.    Endocrine: Negative.    Hematologic/Lymphatic: Negative.    Skin: Negative.    Musculoskeletal: Negative.    Gastrointestinal: Negative.    Genitourinary: Negative.    Neurological: Negative.     Psychiatric/Behavioral: Negative.    Allergic/Immunologic: Negative.    All other systems reviewed and are negative.          Family History   Problem Relation Age of Onset   • Cancer Father    • Diabetes Son    • Cancer Paternal Aunt    • Heart disease Paternal Aunt    • Cancer Paternal Uncle        Past Medical History:   Diagnosis Date   • Anxiety    • Arthritis    • Atrial fibrillation (CMS/HCC)     paroxysmal   • Broken shoulder     left-- due to fall 11-7-19 was at [facility]   • Buttock pain     rt side   • DDD (degenerative disc disease), lumbar    • Depression    • Edema     9/2020 foot   • GERD (gastroesophageal reflux disease)    • H/O fall    • Hip pain     rt   • Hypertension    • Hypothyroidism    • Insomnia    • Leg pain     rt   • Low back pain    • Neuropathy    • PONV (postoperative nausea and vomiting)    • Pulmonary hypertension (CMS/HCC)    • Radiculopathy    • Restless legs    • Spondylolisthesis    • Urinary incontinence        Social History     Socioeconomic History   • Marital status:      Spouse name: Not on file   • Number of children: Not on file   • Years of education: Not on file   • Highest education level: Not on file   Tobacco Use   • Smoking status: Never Smoker   • Smokeless tobacco: Never Used   Vaping Use   • Vaping Use: Never used   Substance and Sexual Activity   • Alcohol use: No   • Drug use: No   • Sexual activity: Defer       Prior to Admission medications    Medication Sig Start Date End Date Taking? Authorizing Provider   apixaban (ELIQUIS) 5 MG tablet tablet Take 1 tablet by mouth Every 12 (Twelve) Hours. 8/17/20  Yes Barrett Evans MD   cyclobenzaprine (FLEXERIL) 10 MG tablet Take 10 mg by mouth 2 (Two) Times a Day As Needed for Muscle Spasms.   Yes ProviderElton MD   famotidine (PEPCID) 20 MG tablet Take 20 mg by mouth Daily. 6/19/19  Yes ProviderElton MD   furosemide (LASIX) 40 MG tablet Take 40 mg by mouth 2 (Two) Times a Day. Uses  "for leg swelling 3/13/21  Yes Elton Flores MD   gabapentin (NEURONTIN) 600 MG tablet Take 1 tablet by mouth 4 (Four) Times a Day.  Patient taking differently: Take 600 mg by mouth 4 (Four) Times a Day As Needed. 7/31/20  Yes Shefali Worthy MD   HYDROcodone-acetaminophen (NORCO) 7.5-325 MG per tablet Take 1 tablet by mouth 4 (Four) Times a Day As Needed for Severe Pain . DNF before 3/31/2021 3/31/21  Yes Shefali Worthy MD   levothyroxine (SYNTHROID, LEVOTHROID) 75 MCG tablet Take 75 mcg by mouth Daily. Take preop 6/24/19  Yes Elton Flores MD   metoprolol succinate XL (TOPROL-XL) 50 MG 24 hr tablet TAKE 1 TABLET BY MOUTH ONCE DAILY 11/30/20  Yes Barrett Evans MD   pantoprazole (PROTONIX) 40 MG EC tablet Take 40 mg by mouth Daily. Afternoons 6/24/19  Yes Elton Flores MD   rOPINIRole (REQUIP) 0.25 MG tablet TAKE 1 TABLET BY MOUTH EVERY NIGHT 1 HOUR BEFORE BEDTIME  Patient taking differently: 0.25 mg Every Night. 10/6/20  Yes Shefali Worthy MD   sertraline (ZOLOFT) 50 MG tablet Take 50 mg by mouth Every Night. 6/4/20  Yes Elton Flores MD   sildenafil (REVATIO) 20 MG tablet Take 20 mg by mouth 2 (Two) Times a Day. 2/20/21  Yes Elton Flores MD   traZODone (DESYREL) 50 MG tablet Take 25 mg by mouth Every Night. 6/4/20  Yes Elton Flores MD        Objective    Physical Exam     Vital Signs  Temp:  [97.8 °F (36.6 °C)-98.4 °F (36.9 °C)] 97.8 °F (36.6 °C)  Heart Rate:  [63-93] 76  Resp:  [11-18] 11  BP: (115-141)/(60-96) 139/64    Oxygen Therapy  SpO2: 96 %  Pulse Oximetry Type: Intermittent  Device (Oxygen Therapy): nasal cannula  Flow (L/min): 2  Flowsheet Rows      First Filed Value   Admission Height  162.6 cm (64\") Documented at 03/31/2021 1909   Admission Weight  81.6 kg (180 lb) Documented at 03/31/2021 1909        Weight change: -0.447 kg (-15.8 oz)   Intake & Output (last 3 days)       03/30 0701 - 03/31 0700 03/31 0701 - 04/01 0700 04/01 " 0701 - 04/02 0700 04/02 0701 - 04/03 0700    P.O.  240 580 0    Total Intake(mL/kg)  240 (3) 580 (7.1) 0 (0)    Urine (mL/kg/hr)  200      Total Output  200      Net  +40 +580 0            Urine Unmeasured Occurrence   1 x         Lines, Drains & Airways    Active LDAs     Name:   Placement date:   Placement time:   Site:   Days:    Peripheral IV 03/31/21 1912 Right Antecubital   03/31/21 1912    Antecubital   less than 1                Physical Exam:    Physical Exam  Vitals and nursing note reviewed.   Constitutional:       General: She is not in acute distress.     Appearance: She is well-developed. She is obese. She is not ill-appearing, toxic-appearing or diaphoretic.   HENT:      Head: Normocephalic and atraumatic.      Right Ear: Ear canal and external ear normal.      Left Ear: Ear canal and external ear normal.      Nose: Nose normal. No congestion or rhinorrhea.      Mouth/Throat:      Mouth: Mucous membranes are moist.      Pharynx: No oropharyngeal exudate.      Comments: Edentulous  Eyes:      General: No scleral icterus.        Right eye: No discharge.         Left eye: No discharge.      Extraocular Movements: Extraocular movements intact.      Conjunctiva/sclera: Conjunctivae normal.      Pupils: Pupils are equal, round, and reactive to light.   Neck:      Thyroid: No thyromegaly.      Vascular: No carotid bruit or JVD.      Trachea: No tracheal deviation.   Cardiovascular:      Rate and Rhythm: Normal rate and regular rhythm.      Heart sounds: Normal heart sounds. No murmur heard.   No friction rub. No gallop.    Pulmonary:      Effort: Pulmonary effort is normal. No respiratory distress.      Breath sounds: Normal breath sounds. No stridor. No wheezing, rhonchi or rales.   Chest:      Chest wall: No tenderness.   Abdominal:      General: Bowel sounds are normal. There is no distension.      Palpations: Abdomen is soft. There is no mass.      Tenderness: There is no abdominal tenderness. There  is no guarding or rebound.      Hernia: No hernia is present.   Musculoskeletal:         General: No swelling, tenderness, deformity or signs of injury. Normal range of motion.      Cervical back: Normal range of motion and neck supple. No rigidity. No muscular tenderness.      Right lower leg: No edema.      Left lower leg: No edema.   Lymphadenopathy:      Cervical: No cervical adenopathy.   Skin:     General: Skin is warm and dry.      Coloration: Skin is not jaundiced or pale.      Findings: No bruising, erythema or rash.   Neurological:      General: No focal deficit present.      Mental Status: She is alert and oriented to person, place, and time. Mental status is at baseline.      Cranial Nerves: No cranial nerve deficit.      Sensory: No sensory deficit.      Motor: No weakness or abnormal muscle tone.      Coordination: Coordination normal.   Psychiatric:         Mood and Affect: Mood normal.         Behavior: Behavior normal.         Thought Content: Thought content normal.         Judgment: Judgment normal.     Reviewed, no change in above data from the prior day.          PT Recommendation and Plan             Procedures:    Procedure(s):  Cardiac Catheterization/Vascular Study (N/A)     Assessment/Plan with Problem wise       Active Hospital Problems    Diagnosis  POA   • **Chest pain [R07.9]  Yes     Priority: High   • Abnormal stress test [R94.39]  No   • Unstable angina pectoris (CMS/HCC) [I20.0]  Unknown   • Pulmonary hypertension (CMS/HCC) [I27.20]  Yes   • Atrial fibrillation (CMS/HCC) [I48.91]  Yes   • Essential hypertension [I10]  Yes   • Hypothyroidism [E03.9]  Yes      Resolved Hospital Problems   No resolved problems to display.        Estimated Creatinine Clearance: 58.8 mL/min (by C-G formula based on SCr of 0.8 mg/dL).    Code Status and Medical Interventions:   Ordered at: 03/31/21 2028     Code Status:    CPR     Medical Interventions (Level of Support Prior to Arrest):    Full        MEDICAL DECISION MAKING COMPLEXITY BY PROBLEM:       Chest pain:  Nitrates if needed  Narcotics if needed  Add beta-blockers if appropriate  Add ACE inhibitor if appropriate  At [facility] a reductase inhibitor if appropriate  Empiric aspirin  Control heart rate  Oxygen to keep sat up to 94%.  Stress test abnormal-await heart cath cardiology consulted      Atrial fibrillation:   Rate control  Beta-blockers  Calcium channel blockers  Digoxin  +/-Amiodarone  Anticoagulation per CHADS VASC2 SCORE  Close monitoring      Hypertension:  Continue home medications   Options include-  beta-blockers  Calcium channel blockers  ACE inhibitor  Vasodilators  Low-dose diuretics  PRN medications have not been shown to affect outcomes-to be avoided            Care coordination with nursing and cardiology for current care 04/01/21 2:50 PM EDT.    Status post heart cath.  Care coordination with nursing for discharge planning    Plan for disposition:  anticipate pt will need 30 days or less of rehab            Continued Care and Services - Admitted Since 3/31/2021    Coordination has not been started for this encounter.        Historical & Objective Data     Results Review:    I reviewed the patient's new lab and radiology results. 04/02/21     Results from last 7 days   Lab Units 04/01/21  1908 03/31/21  1914   WBC 10*3/mm3 7.50 8.60   HEMOGLOBIN g/dL 11.7* 11.8*   HEMATOCRIT % 35.2 35.9   MCV fL 86.6 87.2   MCH pg 28.9 28.6   MPV fL 7.6 7.3   RDW % 15.7* 15.0   PLATELETS 10*3/mm3 292 295     Results from last 7 days   Lab Units 04/02/21  0405 04/01/21  0519 03/31/21  1914   SODIUM mmol/L 140 142 142   POTASSIUM mmol/L 3.6 3.9 3.2*   CHLORIDE mmol/L 102 103 101   CO2 mmol/L 27.0 30.0* 28.0   BUN mg/dL 12 12 11   CREATININE mg/dL 0.80 0.92 0.96   CALCIUM mg/dL 9.3 9.3 9.5   BILIRUBIN mg/dL  --   --  0.7   ALK PHOS U/L  --   --  102   ALT (SGPT) U/L  --   --  12   AST (SGOT) U/L  --   --  22   GLUCOSE mg/dL 105* 108* 122*      Inflammatory Biomarkers        Invalid input(s): ESR, D-DIMER QUANTITATIVE,  PROCALCITONIN,  alllabslink(lactate:5)@ )@  Lab Results   Component Value Date    PHOS 3.1 01/14/2019     No results found for: HGBA1C  No results found for: CHOL, CHLPL, TRIG, HDL, LDL, LDLDIRECT  Lab Results   Component Value Date    LIPASE 20 (L) 02/11/2019     Results from last 7 days   Lab Units 04/02/21  0405   INR  0.99           Pathology  Lab Results   Lab Value Date/Time    FINALDX  09/04/2019 1428     Polyp, ascending, polypectomy:    Tubular adenoma     YANY/tkd        COVID19   Date Value Ref Range Status   04/01/2021 Not Detected Not Detected - Ref. Range Final        Microbiology Results (last 10 days)     Procedure Component Value - Date/Time    COVID PRE-OP / PRE-PROCEDURE SCREENING ORDER (NO ISOLATION) - Swab, Nasopharynx [[id number]]  (Normal) Collected: 04/01/21 0013    Lab Status: Final result Specimen: Swab from Nasopharynx Updated: 04/01/21 0201    Narrative:      The following orders were created for panel order COVID PRE-OP / PRE-PROCEDURE SCREENING ORDER (NO ISOLATION) - Swab, Nasopharynx.  Procedure                               Abnormality         Status                     ---------                               -----------         ------                     COVID-19,CEPHEID,COR/KEVEN...[[id number]]  Normal              Final result                 Please view results for these tests on the individual orders.    COVID-19,CEPHEID,COR/KEVEN/PAD IN-HOUSE(OR EMERGENT/ADD-ON),NP SWAB IN TRANSPORT MEDIA 3-4 HR TAT, RT-PCR - Swab, Nasopharynx [[id number]]  (Normal) Collected: 04/01/21 0013    Lab Status: Final result Specimen: Swab from Nasopharynx Updated: 04/01/21 0201     COVID19 Not Detected    Narrative:      Fact sheet for providers: https://www.fda.gov/media/310826/download     Fact sheet for patients: https://www.fda.gov/media/249688/download          ECG/EMG Results (most recent)     Procedure Component Value  Units Date/Time    ECG 12 Lead [[id number]] Collected: 03/31/21 1907     Updated: 04/01/21 1710     QT Interval 353 ms     Narrative:      HEART RATE= 106  bpm  RR Interval= 584  ms  KS Interval= 110  ms  P Horizontal Axis= 117  deg  P Front Axis= 60  deg  QRSD Interval= 74  ms  QT Interval= 353  ms  QRS Axis= 65  deg  T Wave Axis= 23  deg  - BORDERLINE ECG -  Sinus tachycardia with irregular rate  When compared with ECG of 02-Nov-2020 19:17:01,  No significant change  Electronically Signed By: Mark Camara ([facility]) 01-Apr-2021 17:09:37  Date and Time of Study: 2021-03-31 19:07:35    ECG 12 Lead [[id number]] Collected: 04/01/21 0220     Updated: 04/02/21 0620     QT Interval 382 ms     Narrative:      HEART RATE= 81  bpm  RR Interval= 736  ms  KS Interval= 179  ms  P Horizontal Axis= 26  deg  P Front Axis= 70  deg  QRSD Interval= 72  ms  QT Interval= 382  ms  QRS Axis= 50  deg  T Wave Axis= 59  deg  - NORMAL ECG -  Sinus rhythm  When compared with ECG of 31-Mar-2021 19:07:35,  Significant rate decrease  Electronically Signed By: Elder Fernández ([facility]) 02-Apr-2021 06:19:47  Date and Time of Study: 2021-04-01 02:20:35          Results for orders placed in visit on 07/15/19    SCANNED VASCULAR STUDIES      Results for orders placed in visit on 06/24/20    SCANNED - ECHOCARDIOGRAM      XR Chest 1 View    Result Date: 3/31/2021  1. Stable exam, with mild chronic change in the left lower lung. No evidence of acute disease.  Electronically Signed By-Maia Finney MD On:3/31/2021 7:48 PM This report was finalized on [id number] by  Maia Finney MD.      I personally reviewed patient's x-ray films and my findings are: 0    I personally reviewed patient's EKG strip and my findings are: 0    Medication Review:   I have reviewed the patient's current medication list 04/02/21     Scheduled Meds  apixaban, 5 mg, Oral, Q12H  famotidine, 20 mg, Oral, Daily  gabapentin, 600 mg, Oral, 4x Daily  GI cocktail, , Oral, Once  levothyroxine,  75 mcg, Oral, Daily  metoprolol succinate XL, 50 mg, Oral, Daily  pantoprazole, 40 mg, Oral, Daily  rOPINIRole, 0.25 mg, Oral, Nightly  sertraline, 50 mg, Oral, Nightly  sildenafil, 20 mg, Oral, BID  sodium chloride, 10 mL, Intravenous, Q12H  traZODone, 25 mg, Oral, Nightly        Meds Infusions       DVT prophylaxis  Mechanical Order History:     None      Pharmalogical Order History:      Ordered     Dose Route Frequency Stop    03/31/21 2028  enoxaparin (LOVENOX) syringe 40 mg  Status:  Discontinued      40 mg SC Every 24 Hours 03/31/21 2153 03/31/21 2156  apixaban (ELIQUIS) tablet 5 mg      5 mg PO Every 12 Hours Scheduled --                Meds PRN  •  acetaminophen **OR** acetaminophen **OR** acetaminophen  •  acetaminophen  •  aluminum-magnesium hydroxide-simethicone  •  cyclobenzaprine  •  diphenhydrAMINE  •  HYDROcodone-acetaminophen  •  ondansetron **OR** ondansetron  •  potassium chloride  •  potassium chloride  •  [COMPLETED] Insert peripheral IV **AND** sodium chloride  •  sodium chloride      Reno Roman MD  04/02/21  16:56 EDT      Electronically signed by Reno Roman MD at 04/02/21 1657     Barrett Evans MD at 04/02/21 1557              Reason for follow-up: Chest pain/unstable angina  Abnormal stress test  Pulmonary hypertension     Patient Care Team:  Anayeli Costa APRN as PCP - General (Family Medicine)  Barrett Evans MD as Consulting Physician (Cardiology)    Subjective .   Feels okay     Review of Systems   Constitutional: Positive for malaise/fatigue. Negative for fever.   HENT: Negative for congestion and hearing loss.    Eyes: Negative for double vision and visual disturbance.   Cardiovascular: Negative for chest pain, claudication, dyspnea on exertion, leg swelling and syncope.   Respiratory: Negative for cough and shortness of breath.    Endocrine: Negative for cold intolerance.   Skin: Negative for color change and rash.   Musculoskeletal: Negative for  "arthritis and joint pain.   Gastrointestinal: Negative for abdominal pain and heartburn.   Genitourinary: Negative for hematuria.   Neurological: Negative for excessive daytime sleepiness and dizziness.   Psychiatric/Behavioral: Negative for depression. The patient is not nervous/anxious.    All other systems reviewed and are negative.      Baclofen, Codeine, Ibuprofen, Methocarbamol, Naproxen, Tizanidine hcl, and Tolmetin    Scheduled Meds:[MAR Hold] apixaban, 5 mg, Oral, Q12H  famotidine, 20 mg, Oral, Daily  gabapentin, 600 mg, Oral, 4x Daily  [MAR Hold] GI cocktail, , Oral, Once  [MAR Hold] levothyroxine, 75 mcg, Oral, Daily  metoprolol succinate XL, 50 mg, Oral, Daily  [MAR Hold] pantoprazole, 40 mg, Oral, Daily  [MAR Hold] rOPINIRole, 0.25 mg, Oral, Nightly  [MAR Hold] sertraline, 50 mg, Oral, Nightly  [MAR Hold] sildenafil, 20 mg, Oral, BID  [MAR Hold] sodium chloride, 10 mL, Intravenous, Q12H  [MAR Hold] traZODone, 25 mg, Oral, Nightly      Continuous Infusions:   PRN Meds:.•  [MAR Hold] acetaminophen **OR** [MAR Hold] acetaminophen **OR** [MAR Hold] acetaminophen  •  [MAR Hold] cyclobenzaprine  •  [MAR Hold] HYDROcodone-acetaminophen  •  [MAR Hold] ondansetron  •  [MAR Hold] potassium chloride  •  potassium chloride  •  [COMPLETED] Insert peripheral IV **AND** [MAR Hold] sodium chloride  •  [MAR Hold] sodium chloride    Objective   Is comfortable sitting in the chair    VITAL SIGNS  Vitals:    04/01/21 2041 04/02/21 0346 04/02/21 0759 04/02/21 1203   BP: 141/96 139/79 130/60 115/70   BP Location: Left arm Left arm Left arm Left arm   Patient Position: Lying Lying Lying Lying   Pulse: 93 72 73 72   Resp: 17 16 16 17   Temp: 98.3 °F (36.8 °C) 97.9 °F (36.6 °C) 98.4 °F (36.9 °C) 97.8 °F (36.6 °C)   TempSrc: Oral Oral Oral Oral   SpO2: 96% 94% 93% 93%   Weight:  81.2 kg (179 lb 0.2 oz)     Height:           Flowsheet Rows      First Filed Value   Admission Height  162.6 cm (64\") Documented at 03/31/2021 1909 "   Admission Weight  81.6 kg (180 lb) Documented at 03/31/2021 1909           TELEMETRY: Sinus rhythm    Physical Exam:  Constitutional:       Appearance: Well-developed.   Eyes:      General: No scleral icterus.     Conjunctiva/sclera: Conjunctivae normal.   HENT:      Head: Normocephalic and atraumatic.    Mouth/Throat:      Mouth: No oral lesions.      Pharynx: Uvula midline.   Neck:      Thyroid: No thyromegaly.      Vascular: No carotid bruit or JVD.      Trachea: Trachea normal.   Pulmonary:      Effort: Pulmonary effort is normal.      Breath sounds: Normal breath sounds.   Cardiovascular:      Normal rate. Regular rhythm.      No gallop.   Pulses:     Intact distal pulses.   Abdominal:      General: Bowel sounds are normal.      Palpations: Abdomen is soft.   Musculoskeletal: Normal range of motion.      Cervical back: Neck supple. Skin:     General: Skin is warm. There is no cyanosis.   Neurological:      Mental Status: Alert and oriented to person, place, and time.      Comments: No focal deficits   Psychiatric:         Behavior: Behavior is cooperative.                  LAB RESULTS (LAST 7 DAYS)    CBC  Results from last 7 days   Lab Units 04/01/21  1908 03/31/21 1914   WBC 10*3/mm3 7.50 8.60   RBC 10*6/mm3 4.06 4.12   HEMOGLOBIN g/dL 11.7* 11.8*   HEMATOCRIT % 35.2 35.9   MCV fL 86.6 87.2   PLATELETS 10*3/mm3 292 295       BMP  Results from last 7 days   Lab Units 04/02/21  0405 04/01/21 0519 03/31/21 1914   SODIUM mmol/L 140 142 142   POTASSIUM mmol/L 3.6 3.9 3.2*   CHLORIDE mmol/L 102 103 101   CO2 mmol/L 27.0 30.0* 28.0   BUN mg/dL 12 12 11   CREATININE mg/dL 0.80 0.92 0.96   GLUCOSE mg/dL 105* 108* 122*       CMP   Results from last 7 days   Lab Units 04/02/21 0405 04/01/21 0519 03/31/21 1914   SODIUM mmol/L 140 142 142   POTASSIUM mmol/L 3.6 3.9 3.2*   CHLORIDE mmol/L 102 103 101   CO2 mmol/L 27.0 30.0* 28.0   BUN mg/dL 12 12 11   CREATININE mg/dL 0.80 0.92 0.96   GLUCOSE mg/dL 105* 108* 122*    ALBUMIN g/dL  --   --  4.50   BILIRUBIN mg/dL  --   --  0.7   ALK PHOS U/L  --   --  102   AST (SGOT) U/L  --   --  22   ALT (SGPT) U/L  --   --  12         BNP        TROPONIN  Results from last 7 days   Lab Units 04/01/21  0041   TROPONIN T ng/mL <0.010       CoAg  Results from last 7 days   Lab Units 04/02/21  0405   INR  0.99       Creatinine Clearance  Estimated Creatinine Clearance: 58.8 mL/min (by C-G formula based on SCr of 0.8 mg/dL).    ABG        Radiology  XR Chest 1 View    Result Date: 3/31/2021  1. Stable exam, with mild chronic change in the left lower lung. No evidence of acute disease.  Electronically Signed By-Maia Finney MD On:3/31/2021 7:48 PM This report was finalized on [id number] by  Maia Finney MD.            EKG        I personally viewed and interpreted the patient's EKG/Telemetry data:    ECHOCARDIOGRAM:    Results for orders placed in visit on 06/24/20    SCANNED - ECHOCARDIOGRAM    STRESS MYOVIEW:    CARDIAC CATHETERIZATION:    OTHER:         Assessment/Plan       Chest pain    Unstable angina pectoris (CMS/HCC)    Essential hypertension    Hypothyroidism    Atrial fibrillation (CMS/HCC)    Pulmonary hypertension (CMS/HCC)    Abnormal stress test      Symptoms of chest pain/unstable angina  Patient stress test was abnormal  We will proceed with cardiac catheterization all the risks and benefits explained to the patient and alternate options  He fully understand and agreed to proceed with the procedure  Patient underwent cardiac catheterization no obstructive CAD  Aggressive control of hypertension cardiac risk factor modification restart Eliquis from morning  I discussed the patients findings and my recommendations with patient     Barrett Evans MD  04/02/21  15:57 EDT                Electronically signed by Barrett Evans MD at 04/02/21 2454     Reno Roman MD at 04/01/21 1450                [facility] Medicine Services  INPATIENT  PROGRESS NOTE  113/1   Hospitalist Team  LOS 0 days      Patient Care Team:  Anayeli Costa APRN as PCP - General (Family Medicine)  Barrett Evans MD as Consulting Physician (Cardiology)        Chief Complaint / Subjective  Chief Complaint   Patient presents with   • Chest Pain       HPI (4 hpi elements or status of 3 chronic)  80-year-old female presents to the ER with a chief complaint of heaviness to her chest which felt like a brick laying on it.  The patient had associated nausea and diaphoresis without associated shortness of breath.  The patient denies nausea at time of interview and is not diaphoretic, however, she continues to have chest heaviness and she states her throat feels like it is burning down into her chest.       Review of records: Stress test 1/13/2019 showed estimated ejection fraction 74%; otherwise normal Cardiolite imaging stress test; EF on echocardiogram dated 1/12/2020 1960 EF 65%     Noted        History taken from: patient chart    ROS  Constitutional: Positive for diaphoresis. Negative for chills and fever.   HENT: Positive for sore throat.    Cardiovascular: Positive for chest pain. Negative for leg swelling.   Respiratory: Negative for shortness of breath.    All other systems reviewed and are negative.   Noted          Family History   Problem Relation Age of Onset   • Cancer Father    • Diabetes Son    • Cancer Paternal Aunt    • Heart disease Paternal Aunt    • Cancer Paternal Uncle        Past Medical History:   Diagnosis Date   • Anxiety    • Arthritis    • Atrial fibrillation (CMS/HCC)     paroxysmal   • Broken shoulder     left-- due to fall 11-7-19 was at [facility]   • Buttock pain     rt side   • DDD (degenerative disc disease), lumbar    • Depression    • Edema     9/2020 foot   • GERD (gastroesophageal reflux disease)    • H/O fall    • Hip pain     rt   • Hypertension    • Hypothyroidism    • Insomnia    • Leg pain     rt   • Low back pain    • Neuropathy     • PONV (postoperative nausea and vomiting)    • Pulmonary hypertension (CMS/HCC)    • Radiculopathy    • Restless legs    • Spondylolisthesis    • Urinary incontinence        Social History     Socioeconomic History   • Marital status:      Spouse name: Not on file   • Number of children: Not on file   • Years of education: Not on file   • Highest education level: Not on file   Tobacco Use   • Smoking status: Never Smoker   • Smokeless tobacco: Never Used   Vaping Use   • Vaping Use: Never used   Substance and Sexual Activity   • Alcohol use: No   • Drug use: No   • Sexual activity: Defer       Prior to Admission medications    Medication Sig Start Date End Date Taking? Authorizing Provider   apixaban (ELIQUIS) 5 MG tablet tablet Take 1 tablet by mouth Every 12 (Twelve) Hours. 8/17/20  Yes Barrett Evans MD   cyclobenzaprine (FLEXERIL) 10 MG tablet Take 10 mg by mouth 2 (Two) Times a Day As Needed for Muscle Spasms.   Yes Elton Flores MD   famotidine (PEPCID) 20 MG tablet Take 20 mg by mouth Daily. 6/19/19  Yes Elton Flores MD   furosemide (LASIX) 40 MG tablet Take 40 mg by mouth 2 (Two) Times a Day. Uses for leg swelling 3/13/21  Yes Elton Flores MD   gabapentin (NEURONTIN) 600 MG tablet Take 1 tablet by mouth 4 (Four) Times a Day.  Patient taking differently: Take 600 mg by mouth 4 (Four) Times a Day As Needed. 7/31/20  Yes Shefali Worthy MD   HYDROcodone-acetaminophen (NORCO) 7.5-325 MG per tablet Take 1 tablet by mouth 4 (Four) Times a Day As Needed for Severe Pain . DNF before 3/31/2021 3/31/21  Yes Shefali Worthy MD   levothyroxine (SYNTHROID, LEVOTHROID) 75 MCG tablet Take 75 mcg by mouth Daily. Take preop 6/24/19  Yes Elton Flores MD   metoprolol succinate XL (TOPROL-XL) 50 MG 24 hr tablet TAKE 1 TABLET BY MOUTH ONCE DAILY 11/30/20  Yes Barrett Evans MD   pantoprazole (PROTONIX) 40 MG EC tablet Take 40 mg by mouth Daily.  "Afternoons 6/24/19  Yes Elton Flores MD   rOPINIRole (REQUIP) 0.25 MG tablet TAKE 1 TABLET BY MOUTH EVERY NIGHT 1 HOUR BEFORE BEDTIME  Patient taking differently: 0.25 mg Every Night. 10/6/20  Yes Shefali Worthy MD   sertraline (ZOLOFT) 50 MG tablet Take 50 mg by mouth Every Night. 6/4/20  Yes Elton Flores MD   sildenafil (REVATIO) 20 MG tablet Take 20 mg by mouth 2 (Two) Times a Day. 2/20/21  Yes Elton Flores MD   traZODone (DESYREL) 50 MG tablet Take 25 mg by mouth Every Night. 6/4/20  Yes Elton Flores MD        Objective    Physical Exam     Vital Signs  Temp:  [98.1 °F (36.7 °C)-98.6 °F (37 °C)] 98.6 °F (37 °C)  Heart Rate:  [] 89  Resp:  [16-20] 16  BP: (106-165)/(48-85) 128/75    Oxygen Therapy  SpO2: 93 %  Pulse Oximetry Type: Continuous  Device (Oxygen Therapy): room air  Flowsheet Rows      First Filed Value   Admission Height  162.6 cm (64\") Documented at 03/31/2021 1909   Admission Weight  81.6 kg (180 lb) Documented at 03/31/2021 1909        Weight change:    Intake & Output (last 3 days)       03/29 0701 - 03/30 0700 03/30 0701 - 03/31 0700 03/31 0701 - 04/01 0700 04/01 0701 - 04/02 0700    P.O.   240 360    Total Intake(mL/kg)   240 (3) 360 (4.4)    Urine (mL/kg/hr)   200     Total Output   200     Net   +40 +360                Lines, Drains & Airways    Active LDAs     Name:   Placement date:   Placement time:   Site:   Days:    Peripheral IV 03/31/21 1912 Right Antecubital   03/31/21 1912    Antecubital   less than 1                Physical Exam:    Physical Exam  Vitals and nursing note reviewed.   Constitutional:       General: She is not in acute distress.     Appearance: She is well-developed. She is obese. She is not ill-appearing, toxic-appearing or diaphoretic.   HENT:      Head: Normocephalic and atraumatic.      Right Ear: Ear canal and external ear normal.      Left Ear: Ear canal and external ear normal.      Nose: Nose normal. No " congestion or rhinorrhea.      Mouth/Throat:      Mouth: Mucous membranes are moist.      Pharynx: No oropharyngeal exudate.      Comments: Edentulous  Eyes:      General: No scleral icterus.        Right eye: No discharge.         Left eye: No discharge.      Extraocular Movements: Extraocular movements intact.      Conjunctiva/sclera: Conjunctivae normal.      Pupils: Pupils are equal, round, and reactive to light.   Neck:      Thyroid: No thyromegaly.      Vascular: No carotid bruit or JVD.      Trachea: No tracheal deviation.   Cardiovascular:      Rate and Rhythm: Normal rate and regular rhythm.      Heart sounds: Normal heart sounds. No murmur heard.   No friction rub. No gallop.    Pulmonary:      Effort: Pulmonary effort is normal. No respiratory distress.      Breath sounds: Normal breath sounds. No stridor. No wheezing, rhonchi or rales.   Chest:      Chest wall: No tenderness.   Abdominal:      General: Bowel sounds are normal. There is no distension.      Palpations: Abdomen is soft. There is no mass.      Tenderness: There is no abdominal tenderness. There is no guarding or rebound.      Hernia: No hernia is present.   Musculoskeletal:         General: No swelling, tenderness, deformity or signs of injury. Normal range of motion.      Cervical back: Normal range of motion and neck supple. No rigidity. No muscular tenderness.      Right lower leg: No edema.      Left lower leg: No edema.   Lymphadenopathy:      Cervical: No cervical adenopathy.   Skin:     General: Skin is warm and dry.      Coloration: Skin is not jaundiced or pale.      Findings: No bruising, erythema or rash.   Neurological:      General: No focal deficit present.      Mental Status: She is alert and oriented to person, place, and time. Mental status is at baseline.      Cranial Nerves: No cranial nerve deficit.      Sensory: No sensory deficit.      Motor: No weakness or abnormal muscle tone.      Coordination: Coordination normal.    Psychiatric:         Mood and Affect: Mood normal.         Behavior: Behavior normal.         Thought Content: Thought content normal.         Judgment: Judgment normal.               PT Recommendation and Plan             Procedures:    * No surgery found *     Assessment/Plan with Problem wise       Active Hospital Problems    Diagnosis  POA   • **Chest pain [R07.9]  Yes     Priority: High   • Abnormal stress test [R94.39]  No   • Pulmonary hypertension (CMS/HCC) [I27.20]  Yes   • Atrial fibrillation (CMS/HCC) [I48.91]  Yes   • Essential hypertension [I10]  Yes   • Hypothyroidism [E03.9]  Yes      Resolved Hospital Problems   No resolved problems to display.        Estimated Creatinine Clearance: 51.1 mL/min (by C-G formula based on SCr of 0.92 mg/dL).    Code Status and Medical Interventions:   Ordered at: 03/31/21 2028     Code Status:    CPR     Medical Interventions (Level of Support Prior to Arrest):    Full       MEDICAL DECISION MAKING COMPLEXITY BY PROBLEM:       Chest pain:  Nitrates if needed  Narcotics if needed  Add beta-blockers if appropriate  Add ACE inhibitor if appropriate  At [facility] a reductase inhibitor if appropriate  Empiric aspirin  Control heart rate  Oxygen to keep sat up to 94%.  Stress test abnormal-await heart cath cardiology consulted      Atrial fibrillation:   Rate control  Beta-blockers  Calcium channel blockers  Digoxin  +/-Amiodarone  Anticoagulation per CHADS VASC2 SCORE  Close monitoring      Hypertension:  Continue home medications   Options include-  beta-blockers  Calcium channel blockers  ACE inhibitor  Vasodilators  Low-dose diuretics  PRN medications have not been shown to affect outcomes-to be avoided            Care coordination with nursing and cardiology for current care 04/01/21 2:50 PM EDT.    Plan for disposition:  anticipate pt will need 30 days or less of rehab            Continued Care and Services - Admitted Since 3/31/2021    Coordination has not been started  for this encounter.        Historical & Objective Data     Results Review:    I reviewed the patient's new lab and radiology results. 04/01/21     Results from last 7 days   Lab Units 03/31/21  1914   WBC 10*3/mm3 8.60   HEMOGLOBIN g/dL 11.8*   HEMATOCRIT % 35.9   MCV fL 87.2   MCH pg 28.6   MPV fL 7.3   RDW % 15.0   PLATELETS 10*3/mm3 295     Results from last 7 days   Lab Units 04/01/21  0519 03/31/21  1914   SODIUM mmol/L 142 142   POTASSIUM mmol/L 3.9 3.2*   CHLORIDE mmol/L 103 101   CO2 mmol/L 30.0* 28.0   BUN mg/dL 12 11   CREATININE mg/dL 0.92 0.96   CALCIUM mg/dL 9.3 9.5   BILIRUBIN mg/dL  --  0.7   ALK PHOS U/L  --  102   ALT (SGPT) U/L  --  12   AST (SGOT) U/L  --  22   GLUCOSE mg/dL 108* 122*     Inflammatory Biomarkers        Invalid input(s): ESR, D-DIMER QUANTITATIVE,  PROCALCITONIN,  alllabslink(lactate:5)@ )@  Lab Results   Component Value Date    PHOS 3.1 01/14/2019     No results found for: HGBA1C  No results found for: CHOL, CHLPL, TRIG, HDL, LDL, LDLDIRECT  Lab Results   Component Value Date    LIPASE 20 (L) 02/11/2019               Pathology  Lab Results   Lab Value Date/Time    FINALDX  09/04/2019 1428     Polyp, ascending, polypectomy:    Tubular adenoma     YANY/tkd        COVID19   Date Value Ref Range Status   04/01/2021 Not Detected Not Detected - Ref. Range Final        Microbiology Results (last 10 days)     Procedure Component Value - Date/Time    COVID PRE-OP / PRE-PROCEDURE SCREENING ORDER (NO ISOLATION) - Swab, Nasopharynx [[id number]]  (Normal) Collected: 04/01/21 0013    Lab Status: Final result Specimen: Swab from Nasopharynx Updated: 04/01/21 0201    Narrative:      The following orders were created for panel order COVID PRE-OP / PRE-PROCEDURE SCREENING ORDER (NO ISOLATION) - Swab, Nasopharynx.  Procedure                               Abnormality         Status                     ---------                               -----------         ------                      COVID-19,CEPHEID,COR/KEVEN...[[id number]]  Normal              Final result                 Please view results for these tests on the individual orders.    COVID-19,CEPHEID,COR/KEVEN/PAD IN-HOUSE(OR EMERGENT/ADD-ON),NP SWAB IN TRANSPORT MEDIA 3-4 HR TAT, RT-PCR - Swab, Nasopharynx [[id number]]  (Normal) Collected: 04/01/21 0013    Lab Status: Final result Specimen: Swab from Nasopharynx Updated: 04/01/21 0201     COVID19 Not Detected    Narrative:      Fact sheet for providers: https://www.fda.gov/media/910957/download     Fact sheet for patients: https://www.fda.gov/media/715161/download          ECG/EMG Results (most recent)     Procedure Component Value Units Date/Time    ECG 12 Lead [[id number]] Collected: 03/31/21 1907     Updated: 03/31/21 1908     QT Interval 353 ms     Narrative:      HEART RATE= 106  bpm  RR Interval= 584  ms  AR Interval= 110  ms  P Horizontal Axis= 117  deg  P Front Axis= 60  deg  QRSD Interval= 74  ms  QT Interval= 353  ms  QRS Axis= 65  deg  T Wave Axis= 23  deg  - BORDERLINE ECG -  Sinus tachycardia with irregular rate  When compared with ECG of 02-Nov-2020 19:17:01,  No significant change  Electronically Signed By:   Date and Time of Study: 2021-03-31 19:07:35    ECG 12 Lead [[id number]] Collected: 04/01/21 0220     Updated: 04/01/21 0222     QT Interval 382 ms     Narrative:      HEART RATE= 81  bpm  RR Interval= 736  ms  AR Interval= 179  ms  P Horizontal Axis= 26  deg  P Front Axis= 70  deg  QRSD Interval= 72  ms  QT Interval= 382  ms  QRS Axis= 50  deg  T Wave Axis= 59  deg  - NORMAL ECG -  Sinus rhythm  When compared with ECG of 31-Mar-2021 19:07:35,  Significant rate decrease  Electronically Signed By:   Date and Time of Study: 2021-04-01 02:20:35          Results for orders placed in visit on 07/15/19    SCANNED VASCULAR STUDIES      Results for orders placed in visit on 06/24/20    SCANNED - ECHOCARDIOGRAM      XR Chest 1 View    Result Date: 3/31/2021  1. Stable exam, with mild  chronic change in the left lower lung. No evidence of acute disease.  Electronically Signed By-Maia Finney MD On:3/31/2021 7:48 PM This report was finalized on [id number] by  Maia Finney MD.      I personally reviewed patient's x-ray films and my findings are: 0    I personally reviewed patient's EKG strip and my findings are: 0    Medication Review:   I have reviewed the patient's current medication list 04/01/21     Scheduled Meds  apixaban, 5 mg, Oral, Q12H  famotidine, 20 mg, Oral, Daily  gabapentin, 600 mg, Oral, 4x Daily  GI cocktail, , Oral, Once  levothyroxine, 75 mcg, Oral, Daily  metoprolol succinate XL, 50 mg, Oral, Daily  pantoprazole, 40 mg, Oral, Daily  rOPINIRole, 0.25 mg, Oral, Nightly  sertraline, 50 mg, Oral, Nightly  sildenafil, 20 mg, Oral, BID  sodium chloride, 10 mL, Intravenous, Q12H  traZODone, 25 mg, Oral, Nightly        Meds Infusions       DVT prophylaxis  Mechanical Order History:     None      Pharmalogical Order History:      Ordered     Dose Route Frequency Stop    03/31/21 2028  enoxaparin (LOVENOX) syringe 40 mg  Status:  Discontinued      40 mg SC Every 24 Hours 03/31/21 2153 03/31/21 2156  apixaban (ELIQUIS) tablet 5 mg      5 mg PO Every 12 Hours Scheduled --                Meds PRN  •  acetaminophen **OR** acetaminophen **OR** acetaminophen  •  cyclobenzaprine  •  HYDROcodone-acetaminophen  •  ondansetron  •  potassium chloride  •  potassium chloride  •  [COMPLETED] Insert peripheral IV **AND** sodium chloride  •  sodium chloride      Reno Roman MD  04/01/21  14:50 EDT      Electronically signed by Reno Roman MD at 04/01/21 1453          Consult Notes (all)      Barrett Evans MD at 04/01/21 1828      Consult Orders    1. Inpatient Cardiology Consult [[id number]] ordered by Reno Roman MD at 04/01/21 1436                 Referring Provider: Dr. Roman  Reason for Consultation: Abnormal stress test unstable angina    Patient Care Team:  Anayeli Costa APRN  as PCP - General (Family Medicine)  Barrett Evans MD as Consulting Physician (Cardiology)    Chief complaint chest pain        History of present illness:  Catalina Moreno is a 80 y.o. female who presents with chest pain.   80-year-old white male patient with a known history of paroxysmal atrial fibrillation hypertension  severe pulmonary hypertension now admitted to hospital with symptoms of chest heaviness like somebody sitting on her  It started somewhat suddenly she felt some radiation into the throat felt a burning sensation  She had problems with pulmonary hypertension possible RV dysfunction and fluid overload previously  No exacerbating or relieving factors  She felt some shortness of breath no diaphoresis or syncopal episodes  A stress Myoview showed large apical defect with some reperfusion on the rest images  Previously stress test was normal  Review of Systems   Constitutional: Positive for malaise/fatigue. Negative for fever.   HENT: Negative for congestion and hearing loss.    Eyes: Negative for double vision and visual disturbance.   Cardiovascular: Negative for chest pain, claudication, dyspnea on exertion, leg swelling and syncope.   Respiratory: Negative for cough and shortness of breath.    Endocrine: Negative for cold intolerance.   Skin: Negative for color change and rash.   Musculoskeletal: Negative for arthritis and joint pain.   Gastrointestinal: Negative for abdominal pain and heartburn.   Genitourinary: Negative for hematuria.   Neurological: Negative for excessive daytime sleepiness and dizziness.   Psychiatric/Behavioral: Negative for depression. The patient is not nervous/anxious.    All other systems reviewed and are negative.      History  Past Medical History:   Diagnosis Date   • Anxiety    • Arthritis    • Atrial fibrillation (CMS/HCC)     paroxysmal   • Broken shoulder     left-- due to fall 11-7-19 was at [facility]   • Buttock pain     rt side   • DDD (degenerative  disc disease), lumbar    • Depression    • Edema     9/2020 foot   • GERD (gastroesophageal reflux disease)    • H/O fall    • Hip pain     rt   • Hypertension    • Hypothyroidism    • Insomnia    • Leg pain     rt   • Low back pain    • Neuropathy    • PONV (postoperative nausea and vomiting)    • Pulmonary hypertension (CMS/HCC)    • Radiculopathy    • Restless legs    • Spondylolisthesis    • Urinary incontinence        Past Surgical History:   Procedure Laterality Date   • BACK SURGERY  07/19/2018    PDC &  PSF L3-L5 insitu   • CHOLECYSTECTOMY     • COLONOSCOPY     • HYSTERECTOMY     • ROTATOR CUFF REPAIR Left        Family History   Problem Relation Age of Onset   • Cancer Father    • Diabetes Son    • Cancer Paternal Aunt    • Heart disease Paternal Aunt    • Cancer Paternal Uncle        Social History     Tobacco Use   • Smoking status: Never Smoker   • Smokeless tobacco: Never Used   Vaping Use   • Vaping Use: Never used   Substance Use Topics   • Alcohol use: No   • Drug use: No        Medications Prior to Admission   Medication Sig Dispense Refill Last Dose   • apixaban (ELIQUIS) 5 MG tablet tablet Take 1 tablet by mouth Every 12 (Twelve) Hours. 180 tablet 3    • cyclobenzaprine (FLEXERIL) 10 MG tablet Take 10 mg by mouth 2 (Two) Times a Day As Needed for Muscle Spasms.      • famotidine (PEPCID) 20 MG tablet Take 20 mg by mouth Daily.  0    • furosemide (LASIX) 40 MG tablet Take 40 mg by mouth 2 (Two) Times a Day. Uses for leg swelling      • gabapentin (NEURONTIN) 600 MG tablet Take 1 tablet by mouth 4 (Four) Times a Day. (Patient taking differently: Take 600 mg by mouth 4 (Four) Times a Day As Needed.) 120 tablet 5    • HYDROcodone-acetaminophen (NORCO) 7.5-325 MG per tablet Take 1 tablet by mouth 4 (Four) Times a Day As Needed for Severe Pain . DNF before 3/31/2021 120 tablet 0    • levothyroxine (SYNTHROID, LEVOTHROID) 75 MCG tablet Take 75 mcg by mouth Daily. Take preop  0    • metoprolol succinate  "XL (TOPROL-XL) 50 MG 24 hr tablet TAKE 1 TABLET BY MOUTH ONCE DAILY 90 tablet 3    • pantoprazole (PROTONIX) 40 MG EC tablet Take 40 mg by mouth Daily. Afternoons  0    • rOPINIRole (REQUIP) 0.25 MG tablet TAKE 1 TABLET BY MOUTH EVERY NIGHT 1 HOUR BEFORE BEDTIME (Patient taking differently: 0.25 mg Every Night.) 30 tablet 5    • sertraline (ZOLOFT) 50 MG tablet Take 50 mg by mouth Every Night.      • sildenafil (REVATIO) 20 MG tablet Take 20 mg by mouth 2 (Two) Times a Day.      • traZODone (DESYREL) 50 MG tablet Take 25 mg by mouth Every Night.            Baclofen, Codeine, Ibuprofen, Methocarbamol, Naproxen, Tizanidine hcl, and Tolmetin    Scheduled Meds:apixaban, 5 mg, Oral, Q12H  famotidine, 20 mg, Oral, Daily  gabapentin, 600 mg, Oral, 4x Daily  GI cocktail, , Oral, Once  levothyroxine, 75 mcg, Oral, Daily  metoprolol succinate XL, 50 mg, Oral, Daily  pantoprazole, 40 mg, Oral, Daily  rOPINIRole, 0.25 mg, Oral, Nightly  sertraline, 50 mg, Oral, Nightly  sildenafil, 20 mg, Oral, BID  sodium chloride, 10 mL, Intravenous, Q12H  traZODone, 25 mg, Oral, Nightly      Continuous Infusions:   PRN Meds:.•  acetaminophen **OR** acetaminophen **OR** acetaminophen  •  cyclobenzaprine  •  HYDROcodone-acetaminophen  •  ondansetron  •  potassium chloride  •  potassium chloride  •  [COMPLETED] Insert peripheral IV **AND** sodium chloride  •  sodium chloride        VITAL SIGNS  Vitals:    04/01/21 0607 04/01/21 1041 04/01/21 1438 04/01/21 1750   BP: 106/64 150/85 128/75 127/74   BP Location: Left arm Left arm Left arm Left arm   Patient Position: Lying Lying Lying Lying   Pulse: 98 100 89 63   Resp: 16 20 16 18   Temp: 98.2 °F (36.8 °C) 98.1 °F (36.7 °C) 98.6 °F (37 °C) 98.2 °F (36.8 °C)   TempSrc: Oral Axillary Oral Oral   SpO2: 95% 96% 93% 96%   Weight: 81.3 kg (179 lb 3.7 oz)      Height:           Flowsheet Rows      First Filed Value   Admission Height  162.6 cm (64\") Documented at 03/31/2021 1909   Admission Weight  81.6 " kg (180 lb) Documented at 03/31/2021 1909           TELEMETRY: Sinus rhythm    Physical Exam:  Constitutional:       Appearance: Well-developed.   Eyes:      General: No scleral icterus.     Conjunctiva/sclera: Conjunctivae normal.   HENT:      Head: Normocephalic and atraumatic.    Mouth/Throat:      Mouth: No oral lesions.      Pharynx: Uvula midline.   Neck:      Thyroid: No thyromegaly.      Vascular: No carotid bruit or JVD.      Trachea: Trachea normal.   Pulmonary:      Effort: Pulmonary effort is normal.      Breath sounds: Normal breath sounds.   Cardiovascular:      Normal rate. Regular rhythm.      No gallop.   Pulses:     Intact distal pulses.   Abdominal:      General: Bowel sounds are normal.      Palpations: Abdomen is soft.   Musculoskeletal: Normal range of motion.      Cervical back: Neck supple. Skin:     General: Skin is warm. There is no cyanosis.   Neurological:      Mental Status: Alert and oriented to person, place, and time.      Comments: No focal deficits   Psychiatric:         Behavior: Behavior is cooperative.                  LAB RESULTS (LAST 7 DAYS)    CBC  Results from last 7 days   Lab Units 03/31/21 1914   WBC 10*3/mm3 8.60   RBC 10*6/mm3 4.12   HEMOGLOBIN g/dL 11.8*   HEMATOCRIT % 35.9   MCV fL 87.2   PLATELETS 10*3/mm3 295       BMP  Results from last 7 days   Lab Units 04/01/21  0519 03/31/21 1914   SODIUM mmol/L 142 142   POTASSIUM mmol/L 3.9 3.2*   CHLORIDE mmol/L 103 101   CO2 mmol/L 30.0* 28.0   BUN mg/dL 12 11   CREATININE mg/dL 0.92 0.96   GLUCOSE mg/dL 108* 122*       CMP   Results from last 7 days   Lab Units 04/01/21 0519 03/31/21 1914   SODIUM mmol/L 142 142   POTASSIUM mmol/L 3.9 3.2*   CHLORIDE mmol/L 103 101   CO2 mmol/L 30.0* 28.0   BUN mg/dL 12 11   CREATININE mg/dL 0.92 0.96   GLUCOSE mg/dL 108* 122*   ALBUMIN g/dL  --  4.50   BILIRUBIN mg/dL  --  0.7   ALK PHOS U/L  --  102   AST (SGOT) U/L  --  22   ALT (SGPT) U/L  --  12         BNP         TROPONIN  Results from last 7 days   Lab Units 04/01/21  0041   TROPONIN T ng/mL <0.010       CoAg        Creatinine Clearance  Estimated Creatinine Clearance: 51.1 mL/min (by C-G formula based on SCr of 0.92 mg/dL).    ABG        Radiology  XR Chest 1 View    Result Date: 3/31/2021  1. Stable exam, with mild chronic change in the left lower lung. No evidence of acute disease.  Electronically Signed By-Maia Finney MD On:3/31/2021 7:48 PM This report was finalized on [id number] by  Maia Finney MD.          EKG          I personally viewed and interpreted the patient's EKG/Telemetry data:    ECHOCARDIOGRAM:    Results for orders placed in visit on 06/24/20    SCANNED - ECHOCARDIOGRAM      STRESS MYOVIEW:    CARDIAC CATHETERIZATION:    OTHER:         Assessment/Plan       Chest pain    Essential hypertension    Hypothyroidism    Atrial fibrillation (CMS/HCC)    Pulmonary hypertension (CMS/HCC)    Abnormal stress test      Symptoms of chest pressure/unstable angina  Abnormal stress test  History of paroxysmal atrial fibrillation and hypertension  Patient on Eliquis and it was not given today  We will hold tomorrow morning and cardiac cath tomorrow  We will try right and left cardiac catheterization  Discussed with the patient about all the risks and benefits of the procedure and alternate options  Patient fully understand and agreed to proceed with the procedure    I discussed the patients findings and my recommendations with patient and attending nurse    Barrett Evans MD  04/01/21  18:28 EDT              Electronically signed by Barrett Evans MD at 04/01/21 1833          Discharge Summary      Reno Roman MD at 04/03/21 1028            Date of Admission: 3/31/2021  358/1    Date of Discharge:  4/3/2021    Length of stay:  LOS: 2 days     Patient was examined with relevant and adequate PPE keeping in mind the current coronavirus pandemic. Minimum of 10 minutes to don and doff  PPE.      Presenting Problem/History of Present Illness   Present on Admission:  • (Resolved) Chest pain  • Essential hypertension  • Hypothyroidism  • Atrial fibrillation (CMS/HCC)  • Pulmonary hypertension (CMS/HCC)  • (Resolved) Unstable angina pectoris (CMS/HCC)        Hospital Course  Chief complaint:  Chief Complaint   Patient presents with   • Chest Pain       Catalina Moreno 80 y.o. female.    PCP  Anayeli Costa APRN (General)    80-year-old female presents to the ER with a chief complaint of heaviness to her chest which felt like a brick laying on it.  The patient had associated nausea and diaphoresis without associated shortness of breath.  The patient denies nausea at time of interview and is not diaphoretic, however, she continues to have chest heaviness and she states her throat feels like it is burning down into her chest.       Review of records: Stress test 1/13/2019 showed estimated ejection fraction 74%; otherwise normal Cardiolite imaging stress test; EF on echocardiogram dated 1/12/2020 1960 EF 65%         Patient admitted with chest pain.  Stress Myoview was abnormal.  Cardiology was consulted and she had a heart cath which revealed nonobstructive disease.  PCP to address polypharmacy.  I would not encourage avoidable medications such as muscle relaxants and other anticholinergic medications.    Review of Systems   Constitutional: Negative.   HENT: Negative.    Eyes: Negative.    Cardiovascular: Negative.    Respiratory: Negative.    Endocrine: Negative.    Hematologic/Lymphatic: Negative.    Skin: Negative.    Musculoskeletal: Negative.    Gastrointestinal: Negative.    Genitourinary: Negative.    Neurological: Negative.    Psychiatric/Behavioral: Negative.    Allergic/Immunologic: Negative.    All other systems reviewed and are negative.      Family History   Problem Relation Age of Onset   • Cancer Father    • Diabetes Son    • Cancer Paternal Aunt    • Heart disease Paternal Aunt    •  Cancer Paternal Uncle         Past Medical History:   Diagnosis Date   • Anxiety    • Arthritis    • Atrial fibrillation (CMS/HCC)     paroxysmal   • Broken shoulder     left-- due to fall 11-7-19 was at [facility]   • Buttock pain     rt side   • DDD (degenerative disc disease), lumbar    • Depression    • Edema     9/2020 foot   • GERD (gastroesophageal reflux disease)    • H/O fall    • Hip pain     rt   • Hypertension    • Hypothyroidism    • Insomnia    • Leg pain     rt   • Low back pain    • Neuropathy    • PONV (postoperative nausea and vomiting)    • Pulmonary hypertension (CMS/HCC)    • Radiculopathy    • Restless legs    • Spondylolisthesis    • Urinary incontinence        Past Surgical History:   Procedure Laterality Date   • BACK SURGERY  07/19/2018    PDC &  PSF L3-L5 insitu   • CHOLECYSTECTOMY     • COLONOSCOPY     • HYSTERECTOMY     • ROTATOR CUFF REPAIR Left        Social History     Socioeconomic History   • Marital status:      Spouse name: Not on file   • Number of children: Not on file   • Years of education: Not on file   • Highest education level: Not on file   Tobacco Use   • Smoking status: Never Smoker   • Smokeless tobacco: Never Used   Vaping Use   • Vaping Use: Never used   Substance and Sexual Activity   • Alcohol use: No   • Drug use: No   • Sexual activity: Defer       Vital Signs  Temp:  [97.8 °F (36.6 °C)-98.3 °F (36.8 °C)] 97.8 °F (36.6 °C)  Heart Rate:  [68-99] 99  Resp:  [11-18] 18  BP: (105-139)/(53-70) 120/54  Weight change: -0.1 kg (-3.5 oz)    Physical Exam:  Physical Exam  Vitals and nursing note reviewed.   Constitutional:       General: She is not in acute distress.     Appearance: She is well-developed. She is obese. She is not ill-appearing, toxic-appearing or diaphoretic.   HENT:      Head: Normocephalic and atraumatic.      Right Ear: Ear canal and external ear normal.      Left Ear: Ear canal and external ear normal.      Nose: Nose normal. No congestion or  rhinorrhea.      Mouth/Throat:      Mouth: Mucous membranes are moist.      Pharynx: No oropharyngeal exudate.      Comments: Edentulous  Eyes:      General: No scleral icterus.        Right eye: No discharge.         Left eye: No discharge.      Extraocular Movements: Extraocular movements intact.      Conjunctiva/sclera: Conjunctivae normal.      Pupils: Pupils are equal, round, and reactive to light.   Neck:      Thyroid: No thyromegaly.      Vascular: No carotid bruit or JVD.      Trachea: No tracheal deviation.   Cardiovascular:      Rate and Rhythm: Normal rate and regular rhythm.      Heart sounds: Normal heart sounds. No murmur heard.   No friction rub. No gallop.    Pulmonary:      Effort: Pulmonary effort is normal. No respiratory distress.      Breath sounds: Normal breath sounds. No stridor. No wheezing, rhonchi or rales.   Chest:      Chest wall: No tenderness.   Abdominal:      General: Bowel sounds are normal. There is no distension.      Palpations: Abdomen is soft. There is no mass.      Tenderness: There is no abdominal tenderness. There is no guarding or rebound.      Hernia: No hernia is present.   Musculoskeletal:         General: No swelling, tenderness, deformity or signs of injury. Normal range of motion.      Cervical back: Normal range of motion and neck supple. No rigidity. No muscular tenderness.      Right lower leg: No edema.      Left lower leg: No edema.   Lymphadenopathy:      Cervical: No cervical adenopathy.   Skin:     General: Skin is warm and dry.      Coloration: Skin is not jaundiced or pale.      Findings: No bruising, erythema or rash.   Neurological:      General: No focal deficit present.      Mental Status: She is alert and oriented to person, place, and time. Mental status is at baseline.      Cranial Nerves: No cranial nerve deficit.      Sensory: No sensory deficit.      Motor: No weakness or abnormal muscle tone.      Coordination: Coordination normal.   Psychiatric:          Mood and Affect: Mood normal.         Behavior: Behavior normal.         Thought Content: Thought content normal.         Judgment: Judgment normal.   Reviewed, no change in above data from the prior day.          Discharge Diagnosis:     Active Hospital Problems    Diagnosis  POA   • Polypharmacy [Z79.899]  Not Applicable   • Pulmonary hypertension (CMS/HCC) [I27.20]  Yes   • Atrial fibrillation (CMS/HCC) [I48.91]  Yes   • Essential hypertension [I10]  Yes   • Hypothyroidism [E03.9]  Yes      Resolved Hospital Problems    Diagnosis Date Resolved POA   • **Chest pain [R07.9] 04/03/2021 Yes     Priority: High   • Abnormal stress test [R94.39] 04/03/2021 No   • Unstable angina pectoris (CMS/HCC) [I20.0] 04/03/2021 Yes       Estimated Creatinine Clearance: 58.8 mL/min (by C-G formula based on SCr of 0.8 mg/dL).    Discharge Disposition    Continued Care and Services - Admitted Since 3/31/2021    Coordination has not been started for this encounter.             PT Recommendation and Plan          Home or Self Care           Discharge Medications      New Medications      Instructions Start Date   atorvastatin 10 MG tablet  Commonly known as: LIPITOR   10 mg, Oral, Daily         Changes to Medications      Instructions Start Date   gabapentin 600 MG tablet  Commonly known as: NEURONTIN  What changed: when to take this   600 mg, Oral, 2 Times Daily      rOPINIRole 0.25 MG tablet  Commonly known as: REQUIP  What changed:   · how to take this  · additional instructions   TAKE 1 TABLET BY MOUTH EVERY NIGHT 1 HOUR BEFORE BEDTIME         Continue These Medications      Instructions Start Date   apixaban 5 MG tablet tablet  Commonly known as: ELIQUIS   5 mg, Oral, Every 12 Hours Scheduled      famotidine 20 MG tablet  Commonly known as: PEPCID   20 mg, Oral, Daily      furosemide 40 MG tablet  Commonly known as: LASIX   40 mg, Oral, 2 Times Daily, Uses for leg swelling      HYDROcodone-acetaminophen 7.5-325 MG per  tablet  Commonly known as: NORCO   1 tablet, Oral, 4 Times Daily PRN, DNF before 3/31/2021      levothyroxine 75 MCG tablet  Commonly known as: SYNTHROID, LEVOTHROID   75 mcg, Oral, Daily, Take preop      metoprolol succinate XL 50 MG 24 hr tablet  Commonly known as: TOPROL-XL   TAKE 1 TABLET BY MOUTH ONCE DAILY      pantoprazole 40 MG EC tablet  Commonly known as: PROTONIX   40 mg, Oral, Daily, Afternoons      sertraline 50 MG tablet  Commonly known as: ZOLOFT   50 mg, Oral, Nightly      sildenafil 20 MG tablet  Commonly known as: REVATIO   20 mg, Oral, 2 Times Daily      traZODone 50 MG tablet  Commonly known as: DESYREL   25 mg, Oral, Nightly         Stop These Medications    cyclobenzaprine 10 MG tablet  Commonly known as: FLEXERIL          Discharge medications personally reviewed by me and med rec done by me personally.  04/03/21, 10:28 AM EDT        Consults:   Consults     Date and Time Order Name Status Description    4/1/2021  2:36 PM Inpatient Cardiology Consult Completed     3/31/2021  7:57 PM Hospitalist (on-call MD unless specified) Completed           Procedures Performed:    Procedure(s):  Cardiac Catheterization/Vascular Study (N/A)        Pertinent Test Results:   Results from last 7 days   Lab Units 04/01/21  1908 03/31/21  1914   WBC 10*3/mm3 7.50 8.60   HEMOGLOBIN g/dL 11.7* 11.8*   HEMATOCRIT % 35.2 35.9   MCV fL 86.6 87.2   MCH pg 28.9 28.6   PLATELETS 10*3/mm3 292 295     Results from last 7 days   Lab Units 04/02/21  0405 04/01/21  0519 03/31/21  1914   SODIUM mmol/L 140 142 142   POTASSIUM mmol/L 3.6 3.9 3.2*   CHLORIDE mmol/L 102 103 101   CO2 mmol/L 27.0 30.0* 28.0   BUN mg/dL 12 12 11   CREATININE mg/dL 0.80 0.92 0.96   CALCIUM mg/dL 9.3 9.3 9.5   BILIRUBIN mg/dL  --   --  0.7   ALK PHOS U/L  --   --  102   ALT (SGPT) U/L  --   --  12   AST (SGOT) U/L  --   --  22   GLUCOSE mg/dL 105* 108* 122*     Lab Results   Component Value Date    CALCIUM 9.3 04/02/2021    PHOS 3.1 01/14/2019     No  results found for: HGBA1C  No results found for: CHOL, CHLPL, TRIG, HDL, LDL, LDLDIRECT  Lab Results   Component Value Date    LIPASE 20 (L) 02/11/2019         Pathology  Lab Results   Lab Value Date/Time    FINALDX  09/04/2019 1428     Polyp, ascending, polypectomy:    Tubular adenoma     YANY/tkd        Inflammatory Biomarkers        Invalid input(s): ESR, D-DIMER QUANTITATIVE,  PROCALCITONIN  COVID19   Date Value Ref Range Status   04/01/2021 Not Detected Not Detected - Ref. Range Final        Microbiology Results (last 10 days)     Procedure Component Value - Date/Time    COVID PRE-OP / PRE-PROCEDURE SCREENING ORDER (NO ISOLATION) - Swab, Nasopharynx [[id number]]  (Normal) Collected: 04/01/21 0013    Lab Status: Final result Specimen: Swab from Nasopharynx Updated: 04/01/21 0201    Narrative:      The following orders were created for panel order COVID PRE-OP / PRE-PROCEDURE SCREENING ORDER (NO ISOLATION) - Swab, Nasopharynx.  Procedure                               Abnormality         Status                     ---------                               -----------         ------                     COVID-19,CEPHEID,COR/KEVEN...[[id number]]  Normal              Final result                 Please view results for these tests on the individual orders.    COVID-19,CEPHEID,COR/KEVEN/PAD IN-HOUSE(OR EMERGENT/ADD-ON),NP SWAB IN TRANSPORT MEDIA 3-4 HR TAT, RT-PCR - Swab, Nasopharynx [[id number]]  (Normal) Collected: 04/01/21 0013    Lab Status: Final result Specimen: Swab from Nasopharynx Updated: 04/01/21 0201     COVID19 Not Detected    Narrative:      Fact sheet for providers: https://www.fda.gov/media/966141/download     Fact sheet for patients: https://www.fda.gov/media/892764/download          ECG/EMG Results (most recent)     Procedure Component Value Units Date/Time    ECG 12 Lead [[id number]] Collected: 03/31/21 1907     Updated: 04/01/21 1710     QT Interval 353 ms     Narrative:      HEART RATE= 106  bpm  RR Interval=  584  ms  MT Interval= 110  ms  P Horizontal Axis= 117  deg  P Front Axis= 60  deg  QRSD Interval= 74  ms  QT Interval= 353  ms  QRS Axis= 65  deg  T Wave Axis= 23  deg  - BORDERLINE ECG -  Sinus tachycardia with irregular rate  When compared with ECG of 02-Nov-2020 19:17:01,  No significant change  Electronically Signed By: Mark Camara ([facility]) 01-Apr-2021 17:09:37  Date and Time of Study: 2021-03-31 19:07:35    ECG 12 Lead [[id number]] Collected: 04/01/21 0220     Updated: 04/02/21 0620     QT Interval 382 ms     Narrative:      HEART RATE= 81  bpm  RR Interval= 736  ms  MT Interval= 179  ms  P Horizontal Axis= 26  deg  P Front Axis= 70  deg  QRSD Interval= 72  ms  QT Interval= 382  ms  QRS Axis= 50  deg  T Wave Axis= 59  deg  - NORMAL ECG -  Sinus rhythm  When compared with ECG of 31-Mar-2021 19:07:35,  Significant rate decrease  Electronically Signed By: Elder Fernández ([facility]) 02-Apr-2021 06:19:47  Date and Time of Study: 2021-04-01 02:20:35          Results for orders placed in visit on 07/15/19    SCANNED VASCULAR STUDIES      Results for orders placed in visit on 06/24/20    SCANNED - ECHOCARDIOGRAM      XR Chest 1 View    Result Date: 3/31/2021  1. Stable exam, with mild chronic change in the left lower lung. No evidence of acute disease.  Electronically Signed By-Maia Finney MD On:3/31/2021 7:48 PM This report was finalized on [id number] by  Maia Finney MD.      Xrays, labs reviewed personally by me.  04/03/21  10:28 AM EDT      Condition on Discharge:    Stable    Discharge Diet:   Dietary Orders (From admission, onward)     Start     Ordered    04/02/21 1643  Diet Cardiac; Healthy Heart  Diet Effective Now     Question Answer Comment   Diet / Texture / Consistency Cardiac    Select Type: Healthy Heart        04/02/21 1642                Activity at Discharge:   Activity Instructions     Activity as Tolerated            Follow-up Appointments    Future Appointments   Date Time Provider Department Center    5/18/2021  1:50 PM Shefali Worthy MD [facility]       Additional Instructions for the Follow-ups that You Need to Schedule     Discharge Follow-up with PCP   As directed       Currently Documented PCP:    Anayeli Costa APRN    PCP Phone Number:    [phone]     Follow Up Details: If no PCP, call MD finder at [phone]           Follow-up Information     Barrett Evans MD Follow up.    Specialty: Cardiology  Contact information:  [address] [facility] [place] [address] [phone]             Anayeli Costa APRN .    Specialty: Family Medicine  Why: If no PCP, call MD finder at [phone]  Contact information:  [address]  [address]  [place] [address] [phone]                    Test Results Pending at Discharge       Risk for Readmission (LACE) Score: 13 (4/3/2021  6:01 AM)        Time: I spent  more than 35  minutes on this discharge activity which included: face-to-face encounter with the patient, reviewing the data in the system, coordination of the care with the nursing staff as well as consultants, documentation, and entering orders.   Care coordination with nursing and case management for discharge planning        Reno Lima MD  04/03/21  10:41 EDT            Electronically signed by Reno Lima MD at 04/03/21 1042       Discharge Order (From admission, onward)     Start     Ordered    04/03/21 1027  Discharge patient  Once     Expected Discharge Date: 04/03/21    Discharge Disposition: Home or Self Care    Physician of Record for Attribution - Please select from Treatment Team: RENO LIMA [[id number]]    Review needed by CMO to determine Physician of Record: No       Question Answer Comment   Physician of Record for Attribution - Please select from Treatment Team RENO LIMA    Review needed by CMO to determine Physician of Record No        04/03/21 1027

## 2021-04-09 ENCOUNTER — READMISSION MANAGEMENT (OUTPATIENT)
Dept: CALL CENTER | Facility: HOSPITAL | Age: 81
End: 2021-04-09

## 2021-04-09 NOTE — OUTREACH NOTE
Medical Week 1 Survey      Responses   [facility] patient discharged from?  [facility]   Does the patient have one of the following disease processes/diagnoses(primary or secondary)?  Other   Week 1 attempt successful?  No   Unsuccessful attempts  Attempt 3 [A FEMALE ANSWERED THE PHONE, SAYING HELLO. THIS NURSE ASKED FOR PATIENT BY NAME, AND THIS FEMALE THEN HUNG UP THE PHONE]          Yuki Templeton LPN

## 2021-04-11 ENCOUNTER — HOSPITAL ENCOUNTER (EMERGENCY)
Facility: HOSPITAL | Age: 81
Discharge: HOME | End: 2021-04-12
Attending: EMERGENCY MEDICINE | Admitting: EMERGENCY MEDICINE

## 2021-04-11 ENCOUNTER — APPOINTMENT (OUTPATIENT)
Dept: GENERAL RADIOLOGY | Facility: HOSPITAL | Age: 81
End: 2021-04-11

## 2021-04-11 ENCOUNTER — APPOINTMENT (OUTPATIENT)
Dept: CT IMAGING | Facility: HOSPITAL | Age: 81
End: 2021-04-11

## 2021-04-11 DIAGNOSIS — R11.0 NAUSEA: ICD-10-CM

## 2021-04-11 DIAGNOSIS — R42 DIZZY: Primary | ICD-10-CM

## 2021-04-11 DIAGNOSIS — I10 HYPERTENSION, UNSPECIFIED TYPE: ICD-10-CM

## 2021-04-11 LAB
ALBUMIN SERPL-MCNC: 4.4 G/DL (ref 3.5–5.2)
ALBUMIN/GLOB SERPL: 1.3 G/DL
ALP SERPL-CCNC: 113 U/L (ref 39–117)
ALT SERPL W P-5'-P-CCNC: 13 U/L (ref 1–33)
ANION GAP SERPL CALCULATED.3IONS-SCNC: 14 MMOL/L (ref 5–15)
AST SERPL-CCNC: 24 U/L (ref 1–32)
BASOPHILS # BLD AUTO: 0.1 10*3/MM3 (ref 0–0.2)
BASOPHILS NFR BLD AUTO: 1 % (ref 0–1.5)
BILIRUB SERPL-MCNC: 0.3 MG/DL (ref 0–1.2)
BILIRUB UR QL STRIP: NEGATIVE
BUN SERPL-MCNC: 14 MG/DL (ref 8–23)
BUN/CREAT SERPL: 18.4 (ref 7–25)
CALCIUM SPEC-SCNC: 9.4 MG/DL (ref 8.6–10.5)
CHLORIDE SERPL-SCNC: 97 MMOL/L (ref 98–107)
CLARITY UR: CLEAR
CO2 SERPL-SCNC: 28 MMOL/L (ref 22–29)
COLOR UR: YELLOW
CREAT SERPL-MCNC: 0.76 MG/DL (ref 0.57–1)
DEPRECATED RDW RBC AUTO: 45.9 FL (ref 37–54)
EOSINOPHIL # BLD AUTO: 0.1 10*3/MM3 (ref 0–0.4)
EOSINOPHIL NFR BLD AUTO: 0.8 % (ref 0.3–6.2)
ERYTHROCYTE [DISTWIDTH] IN BLOOD BY AUTOMATED COUNT: 15.1 % (ref 12.3–15.4)
GFR SERPL CREATININE-BSD FRML MDRD: 73 ML/MIN/1.73
GLOBULIN UR ELPH-MCNC: 3.5 GM/DL
GLUCOSE SERPL-MCNC: 138 MG/DL (ref 65–99)
GLUCOSE UR STRIP-MCNC: NEGATIVE MG/DL
HCT VFR BLD AUTO: 35.4 % (ref 34–46.6)
HGB BLD-MCNC: 11.9 G/DL (ref 12–15.9)
HGB UR QL STRIP.AUTO: NEGATIVE
HOLD SPECIMEN: NORMAL
HOLD SPECIMEN: NORMAL
KETONES UR QL STRIP: NEGATIVE
LEUKOCYTE ESTERASE UR QL STRIP.AUTO: NEGATIVE
LYMPHOCYTES # BLD AUTO: 1.7 10*3/MM3 (ref 0.7–3.1)
LYMPHOCYTES NFR BLD AUTO: 22.8 % (ref 19.6–45.3)
MCH RBC QN AUTO: 28.7 PG (ref 26.6–33)
MCHC RBC AUTO-ENTMCNC: 33.7 G/DL (ref 31.5–35.7)
MCV RBC AUTO: 85 FL (ref 79–97)
MONOCYTES # BLD AUTO: 0.6 10*3/MM3 (ref 0.1–0.9)
MONOCYTES NFR BLD AUTO: 8.6 % (ref 5–12)
NEUTROPHILS NFR BLD AUTO: 5 10*3/MM3 (ref 1.7–7)
NEUTROPHILS NFR BLD AUTO: 66.8 % (ref 42.7–76)
NITRITE UR QL STRIP: NEGATIVE
NRBC BLD AUTO-RTO: 0.1 /100 WBC (ref 0–0.2)
PH UR STRIP.AUTO: 7.5 [PH] (ref 5–8)
PLATELET # BLD AUTO: 278 10*3/MM3 (ref 140–450)
PMV BLD AUTO: 8 FL (ref 6–12)
POTASSIUM SERPL-SCNC: 3.9 MMOL/L (ref 3.5–5.2)
PROT SERPL-MCNC: 7.9 G/DL (ref 6–8.5)
PROT UR QL STRIP: NEGATIVE
QT INTERVAL: 337 MS
RBC # BLD AUTO: 4.17 10*6/MM3 (ref 3.77–5.28)
SODIUM SERPL-SCNC: 139 MMOL/L (ref 136–145)
SP GR UR STRIP: 1.01 (ref 1–1.03)
TROPONIN T SERPL-MCNC: <0.01 NG/ML (ref 0–0.03)
UROBILINOGEN UR QL STRIP: NORMAL
WBC # BLD AUTO: 7.5 10*3/MM3 (ref 3.4–10.8)
WHOLE BLOOD HOLD SPECIMEN: NORMAL
WHOLE BLOOD HOLD SPECIMEN: NORMAL

## 2021-04-11 PROCEDURE — 93005 ELECTROCARDIOGRAM TRACING: CPT

## 2021-04-11 PROCEDURE — 71045 X-RAY EXAM CHEST 1 VIEW: CPT

## 2021-04-11 PROCEDURE — 81003 URINALYSIS AUTO W/O SCOPE: CPT | Performed by: EMERGENCY MEDICINE

## 2021-04-11 PROCEDURE — 93005 ELECTROCARDIOGRAM TRACING: CPT | Performed by: EMERGENCY MEDICINE

## 2021-04-11 PROCEDURE — 70450 CT HEAD/BRAIN W/O DYE: CPT

## 2021-04-11 PROCEDURE — 84484 ASSAY OF TROPONIN QUANT: CPT | Performed by: EMERGENCY MEDICINE

## 2021-04-11 PROCEDURE — 80053 COMPREHEN METABOLIC PANEL: CPT | Performed by: EMERGENCY MEDICINE

## 2021-04-11 PROCEDURE — 99284 EMERGENCY DEPT VISIT MOD MDM: CPT

## 2021-04-11 PROCEDURE — 85025 COMPLETE CBC W/AUTO DIFF WBC: CPT | Performed by: EMERGENCY MEDICINE

## 2021-04-11 RX ORDER — METOPROLOL TARTRATE 5 MG/5ML
2.5 INJECTION INTRAVENOUS ONCE
Status: DISCONTINUED | OUTPATIENT
Start: 2021-04-11 | End: 2021-04-12 | Stop reason: HOSPADM

## 2021-04-11 RX ORDER — ONDANSETRON 4 MG/1
4 TABLET, ORALLY DISINTEGRATING ORAL EVERY 6 HOURS PRN
Qty: 12 TABLET | Refills: 0 | OUTPATIENT
Start: 2021-04-11 | End: 2021-06-20

## 2021-04-11 RX ORDER — SODIUM CHLORIDE 0.9 % (FLUSH) 0.9 %
10 SYRINGE (ML) INJECTION AS NEEDED
Status: DISCONTINUED | OUTPATIENT
Start: 2021-04-11 | End: 2021-04-12 | Stop reason: HOSPADM

## 2021-04-11 RX ADMIN — SODIUM CHLORIDE 500 ML: 9 INJECTION, SOLUTION INTRAVENOUS at 23:27

## 2021-04-12 VITALS
WEIGHT: 182 LBS | RESPIRATION RATE: 16 BRPM | BODY MASS INDEX: 31.07 KG/M2 | TEMPERATURE: 98.1 F | HEART RATE: 83 BPM | DIASTOLIC BLOOD PRESSURE: 83 MMHG | SYSTOLIC BLOOD PRESSURE: 139 MMHG | HEIGHT: 64 IN | OXYGEN SATURATION: 98 %

## 2021-04-12 NOTE — ED PROVIDER NOTES
Subjective   Complaint nausea lightheaded elevated blood pressure    History of present 80-year-old female with history of hypertension multiple health problems who states that she has had a 1 week history of intermittent nausea and feeling dizzy she states is mainly in the morning when she gets up.  Some decreased appetite.  No headache visual change speech difficulty or paralysis.  Patient denies any chest pain shortness of breath neck arm or jaw pain to me.  She states that it last several minutes and resolved she does not pass out does not feel like she is going to pass out denies pain states her blood pressure has been elevated as well.  No black or bloody stool nausea but no vomiting.  No other recent change in medications.  She did have recent hospitalization with negative cardiac work-up she states normal stress testing.  Intermittent 1 week seems to be triggered by standing up better when she sits down denies any pain no other associated symptoms other than nausea and feeling generally weak          Review of Systems   Constitutional: Negative for chills and fever.   HENT: Negative for congestion and sinus pressure.    Eyes: Negative for photophobia and visual disturbance.   Respiratory: Negative for chest tightness and shortness of breath.    Cardiovascular: Negative for chest pain and leg swelling.   Gastrointestinal: Positive for nausea. Negative for abdominal pain and vomiting.   Endocrine: Negative for cold intolerance and heat intolerance.   Genitourinary: Negative for difficulty urinating and dysuria.   Musculoskeletal: Positive for arthralgias. Negative for back pain.   Skin: Negative for color change, pallor and rash.   Neurological: Positive for dizziness, weakness and light-headedness. Negative for facial asymmetry, speech difficulty and headaches.   Psychiatric/Behavioral: Negative for agitation and behavioral problems.       Past Medical History:   Diagnosis Date   • Anxiety    • Arthritis    •  Atrial fibrillation (CMS/HCC)     paroxysmal   • Broken shoulder     left-- due to fall 11-7-19 was at [facility]   • Buttock pain     rt side   • DDD (degenerative disc disease), lumbar    • Depression    • Edema     9/2020 foot   • GERD (gastroesophageal reflux disease)    • H/O fall    • Hip pain     rt   • Hypertension    • Hypothyroidism    • Insomnia    • Leg pain     rt   • Low back pain    • Neuropathy    • PONV (postoperative nausea and vomiting)    • Pulmonary hypertension (CMS/HCC)    • Radiculopathy    • Restless legs    • Spondylolisthesis    • Urinary incontinence        Allergies   Allergen Reactions   • Baclofen Rash   • Codeine Nausea Only   • Ibuprofen Unknown (See Comments)     Patient doesn't know----Motin   • Methocarbamol Unknown (See Comments)     Patient doesn't know   • Naproxen Unknown (See Comments)     Pt. Doesn't know    • Tizanidine Hcl Hallucinations   • Tolmetin Dizziness     Pt. Doesn't know-- same as Tolectin       Past Surgical History:   Procedure Laterality Date   • BACK SURGERY  07/19/2018    PDC &  PSF L3-L5 insitu   • CARDIAC CATHETERIZATION N/A 4/2/2021    Procedure: Cardiac Catheterization/Vascular Study;  Surgeon: Barrett Evans MD;  Location: [facility] INVASIVE LOCATION;  Service: Cardiovascular;  Laterality: N/A;   • CHOLECYSTECTOMY     • COLONOSCOPY     • HYSTERECTOMY     • ROTATOR CUFF REPAIR Left        Family History   Problem Relation Age of Onset   • Cancer Father    • Diabetes Son    • Cancer Paternal Aunt    • Heart disease Paternal Aunt    • Cancer Paternal Uncle        Social History     Socioeconomic History   • Marital status:      Spouse name: Not on file   • Number of children: Not on file   • Years of education: Not on file   • Highest education level: Not on file   Tobacco Use   • Smoking status: Never Smoker   • Smokeless tobacco: Never Used   Vaping Use   • Vaping Use: Never used   Substance and Sexual Activity   • Alcohol use: No   •  Drug use: No   • Sexual activity: Defer     No tobacco alcohol or drug use      Objective   Physical Exam  80-year-old female awake alert no acute distress initial blood pressure read is 206/143 on my recheck in the room it was running 150/80  HEENT extraocular static pupils equal reactive no photophobia there is no nystagmus TMs are clear mouth is clear  Neck supple no adenopathy no adrenal bruits  Lungs clear no retraction no use of accessory  Heart is regular without murmur rub  Abdomen soft no tenderness no pulsatile mass or bruits good bowel sounds  Extremities pulses are equal throughout upper and lower extremities no edema cords or Homans' sign or evidence of DVT  Skin warm dry no rash no petechiae no purpura.  Neurologic awake alert orientated x3 no facial asymmetry there is no nystagmus peripheral vision is intact to confrontation.  Tongue and throat are normal shoulder shrug normal no drift the arms or legs normal finger-to-nose normal rapid alternating movements.  The patient is able stand she has no ataxia she uses a walker to walk and I help tenderness no ataxia and there is no syncope or arrhythmia noted and no current dizziness NIH is 0  Procedures           ED Course      Results for orders placed or performed during the hospital encounter of 04/11/21   Comprehensive Metabolic Panel    Specimen: Blood   Result Value Ref Range    Glucose 138 (H) 65 - 99 mg/dL    BUN 14 8 - 23 mg/dL    Creatinine 0.76 0.57 - 1.00 mg/dL    Sodium 139 136 - 145 mmol/L    Potassium 3.9 3.5 - 5.2 mmol/L    Chloride 97 (L) 98 - 107 mmol/L    CO2 28.0 22.0 - 29.0 mmol/L    Calcium 9.4 8.6 - 10.5 mg/dL    Total Protein 7.9 6.0 - 8.5 g/dL    Albumin 4.40 3.50 - 5.20 g/dL    ALT (SGPT) 13 1 - 33 U/L    AST (SGOT) 24 1 - 32 U/L    Alkaline Phosphatase 113 39 - 117 U/L    Total Bilirubin 0.3 0.0 - 1.2 mg/dL    eGFR Non African Amer 73 >60 mL/min/1.73    Globulin 3.5 gm/dL    A/G Ratio 1.3 g/dL    BUN/Creatinine Ratio 18.4 7.0 -  25.0    Anion Gap 14.0 5.0 - 15.0 mmol/L   Troponin    Specimen: Blood   Result Value Ref Range    Troponin T <0.010 0.000 - 0.030 ng/mL   CBC Auto Differential    Specimen: Blood   Result Value Ref Range    WBC 7.50 3.40 - 10.80 10*3/mm3    RBC 4.17 3.77 - 5.28 10*6/mm3    Hemoglobin 11.9 (L) 12.0 - 15.9 g/dL    Hematocrit 35.4 34.0 - 46.6 %    MCV 85.0 79.0 - 97.0 fL    MCH 28.7 26.6 - 33.0 pg    MCHC 33.7 31.5 - 35.7 g/dL    RDW 15.1 12.3 - 15.4 %    RDW-SD 45.9 37.0 - 54.0 fl    MPV 8.0 6.0 - 12.0 fL    Platelets 278 140 - 450 10*3/mm3    Neutrophil % 66.8 42.7 - 76.0 %    Lymphocyte % 22.8 19.6 - 45.3 %    Monocyte % 8.6 5.0 - 12.0 %    Eosinophil % 0.8 0.3 - 6.2 %    Basophil % 1.0 0.0 - 1.5 %    Neutrophils, Absolute 5.00 1.70 - 7.00 10*3/mm3    Lymphocytes, Absolute 1.70 0.70 - 3.10 10*3/mm3    Monocytes, Absolute 0.60 0.10 - 0.90 10*3/mm3    Eosinophils, Absolute 0.10 0.00 - 0.40 10*3/mm3    Basophils, Absolute 0.10 0.00 - 0.20 10*3/mm3    nRBC 0.1 0.0 - 0.2 /100 WBC   Urinalysis With Microscopic If Indicated (No Culture) - Urine, Clean Catch    Specimen: Urine, Clean Catch   Result Value Ref Range    Color, UA Yellow Yellow, Straw    Appearance, UA Clear Clear    pH, UA 7.5 5.0 - 8.0    Specific Gravity, UA 1.015 1.005 - 1.030    Glucose, UA Negative Negative    Ketones, UA Negative Negative    Bilirubin, UA Negative Negative    Blood, UA Negative Negative    Protein, UA Negative Negative    Leuk Esterase, UA Negative Negative    Nitrite, UA Negative Negative    Urobilinogen, UA 0.2 E.U./dL 0.2 - 1.0 E.U./dL   ECG 12 Lead   Result Value Ref Range    QT Interval 337 ms   ECG 12 Lead   Result Value Ref Range    QT Interval 362 ms   Light Blue Top   Result Value Ref Range    Extra Tube hold for add-on    Green Top (Gel)   Result Value Ref Range    Extra Tube Hold for add-ons.    Lavender Top   Result Value Ref Range    Extra Tube hold for add-on    Gold Top - SST   Result Value Ref Range    Extra Tube Hold  for add-ons.      CT Head Without Contrast    Result Date: 4/11/2021  1. No acute intracranial process. MRI is more sensitive for the detection of acute nonhemorrhagic infarct. 2. Mild changes small vessel ischemic disease of indeterminate age, presumably mostly chronic. Volume loss. Atherosclerosis.  Electronically signed by:  Evangelista Dutta M.D.  4/11/2021 6:44 PM    XR Chest 1 View    Result Date: 4/11/2021  No acute cardiopulmonary abnormality.  Electronically Signed By-Osiel Ramey MD On:4/11/2021 7:58 PM This report was finalized on [id number] by  Osiel Ramey MD.    Medications   sodium chloride 0.9 % flush 10 mL (has no administration in time range)   metoprolol tartrate (LOPRESSOR) injection 2.5 mg (0 mg Intravenous Hold 4/11/21 2025)   sodium chloride 0.9 % bolus 500 mL (500 mL Intravenous New Bag 4/11/21 2327)     EKG #1 my interpretation atrial fibrillation rate of 120 QTC of 425 no acute ST elevation abnormal EKG change from previous sinus rhythm  EKG #2 normal sinus rhythm rate of 100 normal axis no hypertrophy PACs were noted abnormal EKG                                       MDM  Number of Diagnoses or Management Options  Dizzy: new and requires workup  Hypertension, unspecified type: established and worsening  Nausea: new and requires workup  Diagnosis management comments: Medical decision making.  Patient had IV established placed on a cardiac monitor showed a sinus tach without acute changes initial EKG obtained showed sinus tach with PACs versus an atrial fibrillation her heart sounds regular.  The patient was given Lopressor 2.5 mg IV and had the above exam and evaluation patient's chemistries glucose was 130 otherwise unremarkable.  A CBC unremarkable urine negative troponin was negative chest x-ray without acute findings and head CT without acute findings patient repeat exam heart rate was in the 80s she was now in a sinus rhythm on the monitor.  She had no symptoms she was awake  alert carry on a conversation she is able to stand there is no ataxia she had been given a 500 cc normal saline bolus..  Records reviewed she just had a recent hospitalization and had some stress testing which had revealed a a apical defect large but had some reperfusion during rest.  They state they could not rule out ischemia otherwise was unremarkable.  Patient underwent a heart catheterization on the following day just the first part of April which showed no obstructive coronary artery disease.  The patient denies any chest pain or shortness of breath she has had these symptoms for a week of nausea and lightheadedness her blood pressure had 1 isolated elevated blood pressure but the rest of my been reasonable without any other significant elevations or signs of an anywhere from 94 to 100%.  But there is no leg pain or swelling no CT evidence just acute DVT or pulmonary embolism or myocardial infarction per EKG rate is stable currently.  She has negative troponins she may have some A. fib but she is back in a sinus rhythm she is on Eliquis as anticoagulation.  Patient was made aware of all these findings we talked about the findings and disposition.  Currently neck troponins negative findings of ischemia on EKG no evidence just DVT pulmonary embolism aneurysm dissection or stroke sepsis or pneumonia she will be discharged home for outpatient management and return if she has any new or worsening problems or concerns       Amount and/or Complexity of Data Reviewed  Clinical lab tests: reviewed  Tests in the radiology section of CPT®: reviewed  Tests in the medicine section of CPT®: reviewed    Risk of Complications, Morbidity, and/or Mortality  Presenting problems: high  Diagnostic procedures: high  Management options: high    Patient Progress  Patient progress: stable      Final diagnoses:   Dizzy   Nausea   Hypertension, unspecified type       ED Disposition  ED Disposition     ED Disposition Condition Comment     Discharge Stable           Anayeli Costa, APRN  [address]  [address]  [place] [address] [phone]    In 1 day           Medication List      New Prescriptions    ondansetron ODT 4 MG disintegrating tablet  Commonly known as: ZOFRAN-ODT  Place 1 tablet on the tongue Every 6 (Six) Hours As Needed for Nausea or Vomiting.        Changed    rOPINIRole 0.25 MG tablet  Commonly known as: REQUIP  TAKE 1 TABLET BY MOUTH EVERY NIGHT 1 HOUR BEFORE BEDTIME  What changed:   · how to take this  · additional instructions           Where to Get Your Medications      These medications were sent to [id number] - [place], [place] - [address] AT [facility] [address] [facility] - [phone]  - [phone]   [address] [place] [address]    Phone: [phone]   · ondansetron ODT 4 MG disintegrating tablet          Mark Camara MD  04/11/21 9717       Mark Camara MD  04/12/21 0000

## 2021-04-12 NOTE — DISCHARGE INSTRUCTIONS
Rest plenty of fluids.  Zofran sent to pharmacy.  Follow-up with your doctor tomorrow.  Return for increasing blood pressure chest pain neck arm jaw pain shortness of breath passing out speech difficulty headaches visual changes unable to talk walk or function normally facial asymmetry or any other new or worsening problems or concerns  Follow-up with your cardiologist concerning your atrial fibrillation and elevated blood pressure tomorrow

## 2021-04-13 LAB — QT INTERVAL: 362 MS

## 2021-04-15 ENCOUNTER — READMISSION MANAGEMENT (OUTPATIENT)
Dept: CALL CENTER | Facility: HOSPITAL | Age: 81
End: 2021-04-15

## 2021-04-15 NOTE — OUTREACH NOTE
Medical Week 2 Survey      Responses   [facility] patient discharged from?  [facility]   Does the patient have one of the following disease processes/diagnoses(primary or secondary)?  Other   Week 2 attempt successful?  No   Unsuccessful attempts  Attempt 1          Yuki Templeton LPN

## 2021-04-16 ENCOUNTER — READMISSION MANAGEMENT (OUTPATIENT)
Dept: CALL CENTER | Facility: HOSPITAL | Age: 81
End: 2021-04-16

## 2021-04-16 NOTE — OUTREACH NOTE
Medical Week 2 Survey      Responses   [facility] patient discharged from?  [facility]   Does the patient have one of the following disease processes/diagnoses(primary or secondary)?  Other   Week 2 attempt successful?  No   Unsuccessful attempts  Attempt 2          Yuki Templeton LPN

## 2021-04-23 ENCOUNTER — READMISSION MANAGEMENT (OUTPATIENT)
Dept: CALL CENTER | Facility: HOSPITAL | Age: 81
End: 2021-04-23

## 2021-04-23 NOTE — OUTREACH NOTE
Medical Week 3 Survey      Responses   [facility] patient discharged from?  [facility]   Does the patient have one of the following disease processes/diagnoses(primary or secondary)?  Other   Week 3 attempt successful?  No   Unsuccessful attempts  Attempt 1          Melania Gray RN

## 2021-04-26 ENCOUNTER — READMISSION MANAGEMENT (OUTPATIENT)
Dept: CALL CENTER | Facility: HOSPITAL | Age: 81
End: 2021-04-26

## 2021-04-26 RX ORDER — ROPINIROLE 0.25 MG/1
TABLET, FILM COATED ORAL
Qty: 30 TABLET | Refills: 5 | Status: SHIPPED | OUTPATIENT
Start: 2021-04-26 | End: 2021-05-20

## 2021-04-26 NOTE — OUTREACH NOTE
Medical Week 3 Survey      Responses   [facility] patient discharged from?  [facility]   Does the patient have one of the following disease processes/diagnoses(primary or secondary)?  Other   Week 3 attempt successful?  No   Unsuccessful attempts  Attempt 2   Revoke  Decline to participate          Bibiana Johnson LPN

## 2021-05-18 ENCOUNTER — OFFICE VISIT (OUTPATIENT)
Dept: PAIN MEDICINE | Facility: CLINIC | Age: 81
End: 2021-05-18

## 2021-05-18 VITALS — WEIGHT: 182 LBS | BODY MASS INDEX: 31.07 KG/M2 | HEIGHT: 64 IN

## 2021-05-18 DIAGNOSIS — G89.4 CHRONIC PAIN SYNDROME: Primary | ICD-10-CM

## 2021-05-18 DIAGNOSIS — G25.81 RESTLESS LEG: ICD-10-CM

## 2021-05-18 DIAGNOSIS — M48.062 LUMBAR STENOSIS WITH NEUROGENIC CLAUDICATION: ICD-10-CM

## 2021-05-18 DIAGNOSIS — M96.1 POSTLAMINECTOMY SYNDROME OF LUMBAR REGION: ICD-10-CM

## 2021-05-18 DIAGNOSIS — Z79.899 HIGH RISK MEDICATION USE: ICD-10-CM

## 2021-05-18 PROCEDURE — 99214 OFFICE O/P EST MOD 30 MIN: CPT | Performed by: ANESTHESIOLOGY

## 2021-05-18 RX ORDER — HYDROCODONE BITARTRATE AND ACETAMINOPHEN 7.5; 325 MG/1; MG/1
1 TABLET ORAL 4 TIMES DAILY PRN
Qty: 120 TABLET | Refills: 0 | Status: ON HOLD | OUTPATIENT
Start: 2021-06-29 | End: 2021-07-27 | Stop reason: SDUPTHER

## 2021-05-18 RX ORDER — LIDOCAINE 50 MG/G
PATCH TOPICAL
COMMUNITY
Start: 2021-04-28 | End: 2021-07-15

## 2021-05-18 RX ORDER — HYDROCODONE BITARTRATE AND ACETAMINOPHEN 7.5; 325 MG/1; MG/1
1 TABLET ORAL 4 TIMES DAILY PRN
Qty: 120 TABLET | Refills: 0 | Status: SHIPPED | OUTPATIENT
Start: 2021-05-30 | End: 2021-05-26 | Stop reason: SDUPTHER

## 2021-05-18 NOTE — PROGRESS NOTES
CC back pain, jonah leg pain  80-year-old female with HTN, depression, chronic polyarthralgia from osteoarthritis, chronic back pain S/p L4-5 laminectomy with bony fusion 2018/[facility]., here for follow-up by tel.  Denies new issues. Dealing with Gi issues for the last 5 days/Called PCP.   Chronic back pain bilateral lower extremity radicular pain and neurogenic claudication symptoms.  Denies saddle anesthesia, bladder or bowel incontinence.  Chronic polyarthralgia / left knee pain/ bilateral feet neuropathic pain    Still  limited in ADL due to bilateral lower extremity pain and weakness.  Ambulating with walker    Utilizes gabapentin, hydrocodone with good relief functional benefit without side effects.      L-Spine x-ray 2019 Degenerative changes of the lumbar spine with stable 6 mm anterolisthesis L4 upon L5  L-spine MRI 2019: postoperative changes are noted at L4 and L5 with laminectomies and posterior bony fusion. Degenerative changes are seen at multiple levels, greatest at L3-4, with moderate to severe spinal canal narrowing and moderate bilateral foraminal narrowing, greater on the left    C-spine CT 2017:  Degenerative changes spondylosis with central stenosis at C5-C6 and left foraminal stenosis C4/5    Pain Assessment   Location of Pain: Lower Back, R Hip, L Hip, R Leg, L Leg  Description of Pain: Dull/Aching, Throbbing, Stabbing  Previous Pain Rating : 8  Current Pain Ratin  Aggravating Factors: Activity  Alleviating Factors: Rest, Medication  Previous Treatments: Nerve Blocks/Injections, Epidural Steroids, Narcotic Pain Medication, Physical Therapy  Previous Diagnostic Studies: X-Ray, MRI    PEG Assessment   What number best describes your pain on average in the past week?10  What number best describes how, during the past week, pain has interfered with your enjoyment of life?7  What number best describes how, during the past week, pain has interfered with your general activity? 8     has a past  "medical history of Anxiety, Arthritis, Atrial fibrillation (CMS/HCC), Broken shoulder, Buttock pain, DDD (degenerative disc disease), lumbar, Depression, Edema, GERD (gastroesophageal reflux disease), H/O fall, Hip pain, Hypertension, Hypothyroidism, Insomnia, Leg pain, Low back pain, Neuropathy, PONV (postoperative nausea and vomiting), Pulmonary hypertension (CMS/HCC), Radiculopathy, Restless legs, Spondylolisthesis, and Urinary incontinence.     has a past surgical history that includes Hysterectomy; Rotator cuff repair (Left); Cholecystectomy; Colonoscopy; Back surgery (07/19/2018); and Cardiac catheterization (N/A, 4/2/2021).  .  Social History     Tobacco Use   • Smoking status: Never Smoker   • Smokeless tobacco: Never Used   Substance Use Topics   • Alcohol use: No       Review of Systems        See HPI      Vitals:    05/18/21 1337   Weight: 82.6 kg (182 lb)   Height: 162.6 cm (64\")     Physical Exam  Constitutional:       General: She is not in acute distress.        PHQ9 on chart                    opioid risk tool high risk        Assessment/Plan  Diagnoses and all orders for this visit:    1. Chronic pain syndrome (Primary)  -     HYDROcodone-acetaminophen (NORCO) 7.5-325 MG per tablet; Take 1 tablet by mouth 4 (Four) Times a Day As Needed for Severe Pain . DNF before 6/29/2021  Dispense: 120 tablet; Refill: 0  -     HYDROcodone-acetaminophen (NORCO) 7.5-325 MG per tablet; Take 1 tablet by mouth 4 (Four) Times a Day As Needed for Severe Pain . DNF before 5/30/2021  Dispense: 120 tablet; Refill: 0    2. Postlaminectomy syndrome of lumbar region    3. Lumbar stenosis with neurogenic claudication    4. Restless leg    5. High risk medication use    Summary:   80-year-old female with HTN, depression, chronic polyarthralgia from osteoarthritis, chronic back pain from DDD/ spondylosis/stenosis, S/p L4/5 laminectomy with bony fusion 7/2018/ [facility] seen in  follow-up.     Chronic lower lumbar /coccyx pain and RLE " radicular pain, continued bilateral lower extremity weakness.    Denies new issues. Dealing with Gi issues for the last 5 days/Called PCP    Continue hydrocodone 7.5/325 4 times daily as needed for severe pain.  UDS and Inspect reviewed.   Discussed risk of tolerance, dependence, respiratory depression, coma and death associated with use of oral opioids for treatment of chronic nonmalignant pain.      Cont Lidocaine patch for worsening myofascial and neuropathic pain.   Cont requip for RLS at night    telemedicine done to avoid coronavirus exposure.   A phone visit was used to complete visit. Total time  21 minutes    RTC  in  2 mo.

## 2021-05-20 RX ORDER — ROPINIROLE 0.25 MG/1
TABLET, FILM COATED ORAL
Qty: 30 TABLET | Refills: 5 | Status: SHIPPED | OUTPATIENT
Start: 2021-05-20 | End: 2021-07-19 | Stop reason: SDUPTHER

## 2021-05-26 ENCOUNTER — TELEPHONE (OUTPATIENT)
Dept: PAIN MEDICINE | Facility: HOSPITAL | Age: 81
End: 2021-05-26

## 2021-05-26 DIAGNOSIS — G89.4 CHRONIC PAIN SYNDROME: ICD-10-CM

## 2021-05-26 RX ORDER — HYDROCODONE BITARTRATE AND ACETAMINOPHEN 7.5; 325 MG/1; MG/1
1 TABLET ORAL 4 TIMES DAILY PRN
Qty: 120 TABLET | Refills: 0 | Status: SHIPPED | OUTPATIENT
Start: 2021-05-29 | End: 2021-07-25

## 2021-05-26 NOTE — TELEPHONE ENCOUNTER
Can you send a new prescription for Hydrocodone in to be filled on [facility] 29th[facility]

## 2021-06-20 ENCOUNTER — APPOINTMENT (OUTPATIENT)
Dept: GENERAL RADIOLOGY | Facility: HOSPITAL | Age: 81
End: 2021-06-20

## 2021-06-20 ENCOUNTER — HOSPITAL ENCOUNTER (EMERGENCY)
Facility: HOSPITAL | Age: 81
Discharge: HOME | End: 2021-06-20
Attending: EMERGENCY MEDICINE | Admitting: EMERGENCY MEDICINE

## 2021-06-20 VITALS
HEART RATE: 105 BPM | WEIGHT: 179 LBS | HEIGHT: 64 IN | SYSTOLIC BLOOD PRESSURE: 155 MMHG | OXYGEN SATURATION: 93 % | DIASTOLIC BLOOD PRESSURE: 69 MMHG | RESPIRATION RATE: 16 BRPM | BODY MASS INDEX: 30.56 KG/M2 | TEMPERATURE: 98.6 F

## 2021-06-20 DIAGNOSIS — K52.9 GASTROENTERITIS: Primary | ICD-10-CM

## 2021-06-20 DIAGNOSIS — M54.50 LOW BACK PAIN, UNSPECIFIED BACK PAIN LATERALITY, UNSPECIFIED CHRONICITY, UNSPECIFIED WHETHER SCIATICA PRESENT: ICD-10-CM

## 2021-06-20 LAB
ALBUMIN SERPL-MCNC: 4.4 G/DL (ref 3.5–5.2)
ALBUMIN/GLOB SERPL: 1.2 G/DL
ALP SERPL-CCNC: 99 U/L (ref 39–117)
ALT SERPL W P-5'-P-CCNC: 10 U/L (ref 1–33)
ANION GAP SERPL CALCULATED.3IONS-SCNC: 16 MMOL/L (ref 5–15)
AST SERPL-CCNC: 22 U/L (ref 1–32)
BASOPHILS # BLD AUTO: 0 10*3/MM3 (ref 0–0.2)
BASOPHILS NFR BLD AUTO: 0.6 % (ref 0–1.5)
BILIRUB SERPL-MCNC: 0.9 MG/DL (ref 0–1.2)
BILIRUB UR QL STRIP: NEGATIVE
BUN SERPL-MCNC: 17 MG/DL (ref 8–23)
BUN/CREAT SERPL: 18.7 (ref 7–25)
CALCIUM SPEC-SCNC: 9.5 MG/DL (ref 8.6–10.5)
CHLORIDE SERPL-SCNC: 99 MMOL/L (ref 98–107)
CLARITY UR: CLEAR
CO2 SERPL-SCNC: 26 MMOL/L (ref 22–29)
COLOR UR: YELLOW
CREAT SERPL-MCNC: 0.91 MG/DL (ref 0.57–1)
D-LACTATE SERPL-SCNC: 1.2 MMOL/L (ref 0.5–2)
DEPRECATED RDW RBC AUTO: 47.3 FL (ref 37–54)
EOSINOPHIL # BLD AUTO: 0 10*3/MM3 (ref 0–0.4)
EOSINOPHIL NFR BLD AUTO: 0.6 % (ref 0.3–6.2)
ERYTHROCYTE [DISTWIDTH] IN BLOOD BY AUTOMATED COUNT: 15.7 % (ref 12.3–15.4)
GFR SERPL CREATININE-BSD FRML MDRD: 59 ML/MIN/1.73
GLOBULIN UR ELPH-MCNC: 3.6 GM/DL
GLUCOSE SERPL-MCNC: 119 MG/DL (ref 65–99)
GLUCOSE UR STRIP-MCNC: NEGATIVE MG/DL
HCT VFR BLD AUTO: 37.1 % (ref 34–46.6)
HGB BLD-MCNC: 12.2 G/DL (ref 12–15.9)
HGB UR QL STRIP.AUTO: NEGATIVE
HOLD SPECIMEN: NORMAL
HOLD SPECIMEN: NORMAL
KETONES UR QL STRIP: NEGATIVE
LEUKOCYTE ESTERASE UR QL STRIP.AUTO: NEGATIVE
LYMPHOCYTES # BLD AUTO: 1.8 10*3/MM3 (ref 0.7–3.1)
LYMPHOCYTES NFR BLD AUTO: 20.7 % (ref 19.6–45.3)
MCH RBC QN AUTO: 28.2 PG (ref 26.6–33)
MCHC RBC AUTO-ENTMCNC: 32.9 G/DL (ref 31.5–35.7)
MCV RBC AUTO: 85.7 FL (ref 79–97)
MONOCYTES # BLD AUTO: 0.7 10*3/MM3 (ref 0.1–0.9)
MONOCYTES NFR BLD AUTO: 7.8 % (ref 5–12)
NEUTROPHILS NFR BLD AUTO: 6.2 10*3/MM3 (ref 1.7–7)
NEUTROPHILS NFR BLD AUTO: 70.3 % (ref 42.7–76)
NITRITE UR QL STRIP: NEGATIVE
NRBC BLD AUTO-RTO: 0.1 /100 WBC (ref 0–0.2)
NT-PROBNP SERPL-MCNC: 705 PG/ML (ref 0–1800)
PH UR STRIP.AUTO: 6.5 [PH] (ref 5–8)
PLATELET # BLD AUTO: 337 10*3/MM3 (ref 140–450)
PMV BLD AUTO: 8.1 FL (ref 6–12)
POTASSIUM SERPL-SCNC: 3.5 MMOL/L (ref 3.5–5.2)
PROT SERPL-MCNC: 8 G/DL (ref 6–8.5)
PROT UR QL STRIP: NEGATIVE
RBC # BLD AUTO: 4.33 10*6/MM3 (ref 3.77–5.28)
SODIUM SERPL-SCNC: 141 MMOL/L (ref 136–145)
SP GR UR STRIP: 1.02 (ref 1–1.03)
TROPONIN T SERPL-MCNC: <0.01 NG/ML (ref 0–0.03)
UROBILINOGEN UR QL STRIP: NORMAL
WBC # BLD AUTO: 8.8 10*3/MM3 (ref 3.4–10.8)
WHOLE BLOOD HOLD SPECIMEN: NORMAL

## 2021-06-20 PROCEDURE — 85025 COMPLETE CBC W/AUTO DIFF WBC: CPT | Performed by: EMERGENCY MEDICINE

## 2021-06-20 PROCEDURE — 96374 THER/PROPH/DIAG INJ IV PUSH: CPT

## 2021-06-20 PROCEDURE — 71045 X-RAY EXAM CHEST 1 VIEW: CPT

## 2021-06-20 PROCEDURE — 93005 ELECTROCARDIOGRAM TRACING: CPT | Performed by: EMERGENCY MEDICINE

## 2021-06-20 PROCEDURE — 83880 ASSAY OF NATRIURETIC PEPTIDE: CPT | Performed by: EMERGENCY MEDICINE

## 2021-06-20 PROCEDURE — 99284 EMERGENCY DEPT VISIT MOD MDM: CPT

## 2021-06-20 PROCEDURE — P9612 CATHETERIZE FOR URINE SPEC: HCPCS

## 2021-06-20 PROCEDURE — 84484 ASSAY OF TROPONIN QUANT: CPT | Performed by: EMERGENCY MEDICINE

## 2021-06-20 PROCEDURE — 83605 ASSAY OF LACTIC ACID: CPT

## 2021-06-20 PROCEDURE — 87040 BLOOD CULTURE FOR BACTERIA: CPT | Performed by: EMERGENCY MEDICINE

## 2021-06-20 PROCEDURE — 80053 COMPREHEN METABOLIC PANEL: CPT | Performed by: EMERGENCY MEDICINE

## 2021-06-20 PROCEDURE — 81003 URINALYSIS AUTO W/O SCOPE: CPT | Performed by: EMERGENCY MEDICINE

## 2021-06-20 PROCEDURE — 25010000002 ONDANSETRON PER 1 MG: Performed by: EMERGENCY MEDICINE

## 2021-06-20 RX ORDER — SODIUM CHLORIDE 0.9 % (FLUSH) 0.9 %
10 SYRINGE (ML) INJECTION AS NEEDED
Status: DISCONTINUED | OUTPATIENT
Start: 2021-06-20 | End: 2021-06-20 | Stop reason: HOSPADM

## 2021-06-20 RX ORDER — ONDANSETRON 2 MG/ML
4 INJECTION INTRAMUSCULAR; INTRAVENOUS ONCE
Status: COMPLETED | OUTPATIENT
Start: 2021-06-20 | End: 2021-06-20

## 2021-06-20 RX ORDER — ONDANSETRON 4 MG/1
4 TABLET, ORALLY DISINTEGRATING ORAL EVERY 8 HOURS PRN
Qty: 10 TABLET | Refills: 0 | Status: SHIPPED | OUTPATIENT
Start: 2021-06-20 | End: 2021-07-25

## 2021-06-20 RX ADMIN — ONDANSETRON 4 MG: 2 INJECTION INTRAMUSCULAR; INTRAVENOUS at 15:07

## 2021-06-20 NOTE — ED NOTES
Taxi cab booked with [facility] taxi company to transport patient home- states they will be here in 15 minutes- will put pt in waiting room     Ayah Hanley  06/20/21 4381

## 2021-06-20 NOTE — DISCHARGE INSTRUCTIONS
Follow-up with your primary doctor.  Return to the emergency room for any new or worsening symptoms or if you have any other questions or concerns.  Take medication as prescribed.  Drink plenty of fluids.

## 2021-06-20 NOTE — ED PROVIDER NOTES
Subjective   Chief complaint: Shortness of breath    81-year-old female presents with multiple complaints.  Patient states she has not felt well for about 3 or 4 days.  She has had nausea, vomiting, diarrhea.  She has had some shortness of breath.  She is concerned she may have a UTI as she has had some low back pain and dysuria.  She has had no documented fever but she does report some diaphoresis.  She denies any chest pain.  She denies any alleviating or exacerbating factors.  No known sick contacts.      History provided by:  Patient      Review of Systems   Constitutional: Negative for fever.   HENT: Negative for congestion and sore throat.    Eyes: Negative for redness.   Respiratory: Positive for shortness of breath. Negative for cough.    Cardiovascular: Negative for chest pain.   Gastrointestinal: Positive for diarrhea, nausea and vomiting. Negative for abdominal pain.   Genitourinary: Positive for dysuria.   Musculoskeletal: Positive for back pain.   Skin: Negative for rash.   Neurological: Negative for dizziness and headaches.   Psychiatric/Behavioral: Negative for confusion.       Past Medical History:   Diagnosis Date   • Anxiety    • Arthritis    • Atrial fibrillation (CMS/HCC)     paroxysmal   • Broken shoulder     left-- due to fall 11-7-19 was at [facility]   • Buttock pain     rt side   • DDD (degenerative disc disease), lumbar    • Depression    • Edema     9/2020 foot   • GERD (gastroesophageal reflux disease)    • H/O fall    • Hip pain     rt   • Hypertension    • Hypothyroidism    • Insomnia    • Leg pain     rt   • Low back pain    • Neuropathy    • PONV (postoperative nausea and vomiting)    • Pulmonary hypertension (CMS/HCC)    • Radiculopathy    • Restless legs    • Spondylolisthesis    • Urinary incontinence        Allergies   Allergen Reactions   • Baclofen Rash   • Codeine Nausea Only   • Ibuprofen Unknown (See Comments)     Patient doesn't know----Motin   • Methocarbamol Unknown (See  "Comments)     Patient doesn't know   • Naproxen Unknown (See Comments)     Pt. Doesn't know    • Tizanidine Hcl Hallucinations   • Tolmetin Dizziness     Pt. Doesn't know-- same as Tolectin       Past Surgical History:   Procedure Laterality Date   • BACK SURGERY  07/19/2018    PDC &  PSF L3-L5 insitu   • CARDIAC CATHETERIZATION N/A 4/2/2021    Procedure: Cardiac Catheterization/Vascular Study;  Surgeon: Barrett Evans MD;  Location: [facility] INVASIVE LOCATION;  Service: Cardiovascular;  Laterality: N/A;   • CHOLECYSTECTOMY     • COLONOSCOPY     • HYSTERECTOMY     • ROTATOR CUFF REPAIR Left        Family History   Problem Relation Age of Onset   • Cancer Father    • Diabetes Son    • Cancer Paternal Aunt    • Heart disease Paternal Aunt    • Cancer Paternal Uncle        Social History     Socioeconomic History   • Marital status:      Spouse name: Not on file   • Number of children: Not on file   • Years of education: Not on file   • Highest education level: Not on file   Tobacco Use   • Smoking status: Never Smoker   • Smokeless tobacco: Never Used   Vaping Use   • Vaping Use: Never used   Substance and Sexual Activity   • Alcohol use: No   • Drug use: No   • Sexual activity: Defer       /62   Pulse 100   Temp 98.6 °F (37 °C) (Oral)   Resp 20   Ht 162.6 cm (64\")   Wt 81.2 kg (179 lb)   SpO2 90%   BMI 30.73 kg/m²       Objective   Physical Exam  Vitals and nursing note reviewed.   Constitutional:       Appearance: She is well-developed.   HENT:      Head: Normocephalic and atraumatic.   Eyes:      Extraocular Movements: Extraocular movements intact.      Pupils: Pupils are equal, round, and reactive to light.   Cardiovascular:      Rate and Rhythm: Regular rhythm. Tachycardia present.      Heart sounds: Normal heart sounds.   Pulmonary:      Effort: Pulmonary effort is normal. No respiratory distress.      Breath sounds: Normal breath sounds.   Abdominal:      General: Bowel " sounds are normal.      Palpations: Abdomen is soft.      Comments: Mild tenderness diffusely, no rebound or guarding   Musculoskeletal:         General: Normal range of motion.      Cervical back: Normal range of motion and neck supple.   Skin:     General: Skin is warm and dry.   Neurological:      General: No focal deficit present.      Mental Status: She is alert and oriented to person, place, and time.         Procedures           ED Course      My interpretation of EKG shows sinus tachycardia, rate of 110, no ST elevation     Results for orders placed or performed during the hospital encounter of 06/20/21   Comprehensive Metabolic Panel    Specimen: Blood   Result Value Ref Range    Glucose 119 (H) 65 - 99 mg/dL    BUN 17 8 - 23 mg/dL    Creatinine 0.91 0.57 - 1.00 mg/dL    Sodium 141 136 - 145 mmol/L    Potassium 3.5 3.5 - 5.2 mmol/L    Chloride 99 98 - 107 mmol/L    CO2 26.0 22.0 - 29.0 mmol/L    Calcium 9.5 8.6 - 10.5 mg/dL    Total Protein 8.0 6.0 - 8.5 g/dL    Albumin 4.40 3.50 - 5.20 g/dL    ALT (SGPT) 10 1 - 33 U/L    AST (SGOT) 22 1 - 32 U/L    Alkaline Phosphatase 99 39 - 117 U/L    Total Bilirubin 0.9 0.0 - 1.2 mg/dL    eGFR Non African Amer 59 (L) >60 mL/min/1.73    Globulin 3.6 gm/dL    A/G Ratio 1.2 g/dL    BUN/Creatinine Ratio 18.7 7.0 - 25.0    Anion Gap 16.0 (H) 5.0 - 15.0 mmol/L   Urinalysis With Culture If Indicated - Urine, Catheter    Specimen: Urine, Catheter   Result Value Ref Range    Color, UA Yellow Yellow, Straw    Appearance, UA Clear Clear    pH, UA 6.5 5.0 - 8.0    Specific Gravity, UA 1.020 1.005 - 1.030    Glucose, UA Negative Negative    Ketones, UA Negative Negative    Bilirubin, UA Negative Negative    Blood, UA Negative Negative    Protein, UA Negative Negative    Leuk Esterase, UA Negative Negative    Nitrite, UA Negative Negative    Urobilinogen, UA 0.2 E.U./dL 0.2 - 1.0 E.U./dL   CBC Auto Differential    Specimen: Blood   Result Value Ref Range    WBC 8.80 3.40 - 10.80  10*3/mm3    RBC 4.33 3.77 - 5.28 10*6/mm3    Hemoglobin 12.2 12.0 - 15.9 g/dL    Hematocrit 37.1 34.0 - 46.6 %    MCV 85.7 79.0 - 97.0 fL    MCH 28.2 26.6 - 33.0 pg    MCHC 32.9 31.5 - 35.7 g/dL    RDW 15.7 (H) 12.3 - 15.4 %    RDW-SD 47.3 37.0 - 54.0 fl    MPV 8.1 6.0 - 12.0 fL    Platelets 337 140 - 450 10*3/mm3    Neutrophil % 70.3 42.7 - 76.0 %    Lymphocyte % 20.7 19.6 - 45.3 %    Monocyte % 7.8 5.0 - 12.0 %    Eosinophil % 0.6 0.3 - 6.2 %    Basophil % 0.6 0.0 - 1.5 %    Neutrophils, Absolute 6.20 1.70 - 7.00 10*3/mm3    Lymphocytes, Absolute 1.80 0.70 - 3.10 10*3/mm3    Monocytes, Absolute 0.70 0.10 - 0.90 10*3/mm3    Eosinophils, Absolute 0.00 0.00 - 0.40 10*3/mm3    Basophils, Absolute 0.00 0.00 - 0.20 10*3/mm3    nRBC 0.1 0.0 - 0.2 /100 WBC   Troponin    Specimen: Blood   Result Value Ref Range    Troponin T <0.010 0.000 - 0.030 ng/mL   BNP    Specimen: Blood   Result Value Ref Range    proBNP 705.0 0.0-1,800.0 pg/mL   POC Lactate    Specimen: Blood   Result Value Ref Range    Lactate 1.2 0.5 - 2.0 mmol/L   ECG 12 Lead   Result Value Ref Range    QT Interval 332 ms   Green Top (Gel)   Result Value Ref Range    Extra Tube Hold for add-ons.    Lavender Top   Result Value Ref Range    Extra Tube hold for add-on    Gold Top - SST   Result Value Ref Range    Extra Tube Hold for add-ons.      XR Chest 1 View    Result Date: 6/20/2021  No acute cardiopulmonary abnormality or significant change.  Electronically Signed By-Dahlia Duque MD On:6/20/2021 3:36 PM This report was finalized on [id number] by  Dahlia Duque MD.                                    MDM   Patient had the above evaluation.  Results were discussed with the patient.  Work-up has been unremarkable.  Chest x-ray shows no acute disease.  EKG shows no acute ischemia.  Troponin is negative.  BNP is normal.  White blood cell count is normal.  Urinalysis shows no UTI.  Patient remains well-appearing in the emergency room.  Patient may have some  gastroenteritis with her nausea, vomiting, diarrhea.  I see no evidence of DVT or PE.  Patient is oxygenating well on room air.  I see no indication for admission at this time.  She will be discharged with a prescription for Zofran and she will follow-up with her primary doctor.      Final diagnoses:   Gastroenteritis   Low back pain, unspecified back pain laterality, unspecified chronicity, unspecified whether sciatica present       ED Disposition  ED Disposition     ED Disposition Condition Comment    Discharge Stable           Anayeli Costa, APRN  [address]  [place] [address] [phone]    Call in 2 days           Medication List      Changed    ondansetron ODT 4 MG disintegrating tablet  Commonly known as: ZOFRAN-ODT  Place 1 tablet on the tongue Every 8 (Eight) Hours As Needed for Nausea or Vomiting.  What changed: when to take this           Where to Get Your Medications      These medications were sent to [id number] - [place], [place] - [address] AT [facility] [address] [facility] [address] [facility] - [phone]  - [phone]   [address] [facility] [address] [place] [address]    Phone: [phone]   · ondansetron ODT 4 MG disintegrating tablet          Pa Stewart MD  06/20/21 1166

## 2021-06-22 LAB — QT INTERVAL: 332 MS

## 2021-06-25 LAB
BACTERIA SPEC AEROBE CULT: NORMAL
BACTERIA SPEC AEROBE CULT: NORMAL

## 2021-07-15 RX ORDER — LIDOCAINE 50 MG/G
PATCH TOPICAL
Qty: 60 PATCH | Refills: 11 | Status: SHIPPED | OUTPATIENT
Start: 2021-07-15 | End: 2021-07-25

## 2021-07-16 ENCOUNTER — TELEPHONE (OUTPATIENT)
Dept: CARDIOLOGY | Facility: CLINIC | Age: 81
End: 2021-07-16

## 2021-07-16 NOTE — TELEPHONE ENCOUNTER
NEEDS REFILL ON ELIQUIS. [facility] IN [place] TOLD PATIENT SHE WOULD NEED TO CONTACT US TO GET REFILL CALLED IN. SHE DOES HAVE ENOUGH TO LAST OVER WEEKEND.

## 2021-07-19 ENCOUNTER — TELEPHONE (OUTPATIENT)
Dept: PAIN MEDICINE | Facility: HOSPITAL | Age: 81
End: 2021-07-19

## 2021-07-19 RX ORDER — ROPINIROLE 0.25 MG/1
0.25 TABLET, FILM COATED ORAL NIGHTLY
Qty: 30 TABLET | Refills: 5 | Status: SHIPPED | OUTPATIENT
Start: 2021-07-19 | End: 2021-08-10

## 2021-07-19 NOTE — TELEPHONE ENCOUNTER
She would like to get a refill of Requip, her pharmacy is not open ( I did call and they are closed) can you send to [facility]?

## 2021-07-25 ENCOUNTER — APPOINTMENT (OUTPATIENT)
Dept: CT IMAGING | Facility: HOSPITAL | Age: 81
End: 2021-07-25

## 2021-07-25 ENCOUNTER — HOSPITAL ENCOUNTER (OUTPATIENT)
Facility: HOSPITAL | Age: 81
Setting detail: OBSERVATION
LOS: 2 days | Discharge: NURSING, REHAB OR LONG-TERM CARE | End: 2021-07-27
Attending: EMERGENCY MEDICINE | Admitting: HOSPITALIST

## 2021-07-25 ENCOUNTER — APPOINTMENT (OUTPATIENT)
Dept: GENERAL RADIOLOGY | Facility: HOSPITAL | Age: 81
End: 2021-07-25

## 2021-07-25 ENCOUNTER — APPOINTMENT (OUTPATIENT)
Dept: CARDIOLOGY | Facility: HOSPITAL | Age: 81
End: 2021-07-25

## 2021-07-25 DIAGNOSIS — G89.4 CHRONIC PAIN SYNDROME: ICD-10-CM

## 2021-07-25 DIAGNOSIS — R10.9 ABDOMINAL PAIN, UNSPECIFIED ABDOMINAL LOCATION: ICD-10-CM

## 2021-07-25 DIAGNOSIS — R11.10 INTRACTABLE VOMITING, PRESENCE OF NAUSEA NOT SPECIFIED, UNSPECIFIED VOMITING TYPE: ICD-10-CM

## 2021-07-25 DIAGNOSIS — I48.91 ATRIAL FIBRILLATION WITH RAPID VENTRICULAR RESPONSE (HCC): Primary | ICD-10-CM

## 2021-07-25 LAB
ALBUMIN SERPL-MCNC: 4.4 G/DL (ref 3.5–5.2)
ALBUMIN/GLOB SERPL: 1.2 G/DL
ALP SERPL-CCNC: 103 U/L (ref 39–117)
ALT SERPL W P-5'-P-CCNC: 13 U/L (ref 1–33)
ANION GAP SERPL CALCULATED.3IONS-SCNC: 16 MMOL/L (ref 5–15)
APTT PPP: 29.4 SECONDS (ref 24–31)
AST SERPL-CCNC: 22 U/L (ref 1–32)
BASOPHILS # BLD AUTO: 0.2 10*3/MM3 (ref 0–0.2)
BASOPHILS NFR BLD AUTO: 1.8 % (ref 0–1.5)
BH CV LOWER VASCULAR LEFT COMMON FEMORAL AUGMENT: NORMAL
BH CV LOWER VASCULAR LEFT COMMON FEMORAL COMPETENT: NORMAL
BH CV LOWER VASCULAR LEFT COMMON FEMORAL COMPRESS: NORMAL
BH CV LOWER VASCULAR LEFT COMMON FEMORAL PHASIC: NORMAL
BH CV LOWER VASCULAR LEFT COMMON FEMORAL SPONT: NORMAL
BH CV LOWER VASCULAR LEFT DISTAL FEMORAL COMPRESS: NORMAL
BH CV LOWER VASCULAR LEFT GASTRONEMIUS COMPRESS: NORMAL
BH CV LOWER VASCULAR LEFT GREATER SAPH AK COMPRESS: NORMAL
BH CV LOWER VASCULAR LEFT GREATER SAPH BK COMPRESS: NORMAL
BH CV LOWER VASCULAR LEFT LESSER SAPH COMPRESS: NORMAL
BH CV LOWER VASCULAR LEFT MID FEMORAL AUGMENT: NORMAL
BH CV LOWER VASCULAR LEFT MID FEMORAL COMPETENT: NORMAL
BH CV LOWER VASCULAR LEFT MID FEMORAL COMPRESS: NORMAL
BH CV LOWER VASCULAR LEFT MID FEMORAL PHASIC: NORMAL
BH CV LOWER VASCULAR LEFT MID FEMORAL SPONT: NORMAL
BH CV LOWER VASCULAR LEFT PERONEAL COMPRESS: NORMAL
BH CV LOWER VASCULAR LEFT POPLITEAL AUGMENT: NORMAL
BH CV LOWER VASCULAR LEFT POPLITEAL COMPETENT: NORMAL
BH CV LOWER VASCULAR LEFT POPLITEAL COMPRESS: NORMAL
BH CV LOWER VASCULAR LEFT POPLITEAL PHASIC: NORMAL
BH CV LOWER VASCULAR LEFT POPLITEAL SPONT: NORMAL
BH CV LOWER VASCULAR LEFT POSTERIOR TIBIAL COMPRESS: NORMAL
BH CV LOWER VASCULAR LEFT PROXIMAL FEMORAL COMPRESS: NORMAL
BH CV LOWER VASCULAR LEFT SAPHENOFEMORAL JUNCTION COMPRESS: NORMAL
BH CV LOWER VASCULAR RIGHT COMMON FEMORAL AUGMENT: NORMAL
BH CV LOWER VASCULAR RIGHT COMMON FEMORAL COMPETENT: NORMAL
BH CV LOWER VASCULAR RIGHT COMMON FEMORAL COMPRESS: NORMAL
BH CV LOWER VASCULAR RIGHT COMMON FEMORAL PHASIC: NORMAL
BH CV LOWER VASCULAR RIGHT COMMON FEMORAL SPONT: NORMAL
BH CV LOWER VASCULAR RIGHT DISTAL FEMORAL COMPRESS: NORMAL
BH CV LOWER VASCULAR RIGHT GASTRONEMIUS COMPRESS: NORMAL
BH CV LOWER VASCULAR RIGHT GREATER SAPH AK COMPRESS: NORMAL
BH CV LOWER VASCULAR RIGHT GREATER SAPH BK COMPRESS: NORMAL
BH CV LOWER VASCULAR RIGHT LESSER SAPH COMPRESS: NORMAL
BH CV LOWER VASCULAR RIGHT MID FEMORAL AUGMENT: NORMAL
BH CV LOWER VASCULAR RIGHT MID FEMORAL COMPETENT: NORMAL
BH CV LOWER VASCULAR RIGHT MID FEMORAL COMPRESS: NORMAL
BH CV LOWER VASCULAR RIGHT MID FEMORAL PHASIC: NORMAL
BH CV LOWER VASCULAR RIGHT MID FEMORAL SPONT: NORMAL
BH CV LOWER VASCULAR RIGHT PERONEAL COMPRESS: NORMAL
BH CV LOWER VASCULAR RIGHT POPLITEAL AUGMENT: NORMAL
BH CV LOWER VASCULAR RIGHT POPLITEAL COMPETENT: NORMAL
BH CV LOWER VASCULAR RIGHT POPLITEAL COMPRESS: NORMAL
BH CV LOWER VASCULAR RIGHT POPLITEAL PHASIC: NORMAL
BH CV LOWER VASCULAR RIGHT POPLITEAL SPONT: NORMAL
BH CV LOWER VASCULAR RIGHT POSTERIOR TIBIAL COMPRESS: NORMAL
BH CV LOWER VASCULAR RIGHT PROXIMAL FEMORAL COMPRESS: NORMAL
BH CV LOWER VASCULAR RIGHT SAPHENOFEMORAL JUNCTION COMPRESS: NORMAL
BH CV POP FLUID COLLECT LEFT: 1
BILIRUB SERPL-MCNC: 0.9 MG/DL (ref 0–1.2)
BILIRUB UR QL STRIP: ABNORMAL
BUN SERPL-MCNC: 17 MG/DL (ref 8–23)
BUN/CREAT SERPL: 18.1 (ref 7–25)
CALCIUM SPEC-SCNC: 9.4 MG/DL (ref 8.6–10.5)
CHLORIDE SERPL-SCNC: 99 MMOL/L (ref 98–107)
CLARITY UR: CLEAR
CO2 SERPL-SCNC: 26 MMOL/L (ref 22–29)
COLOR UR: YELLOW
CREAT SERPL-MCNC: 0.94 MG/DL (ref 0.57–1)
DEPRECATED RDW RBC AUTO: 48.1 FL (ref 37–54)
EOSINOPHIL # BLD AUTO: 0 10*3/MM3 (ref 0–0.4)
EOSINOPHIL NFR BLD AUTO: 0.4 % (ref 0.3–6.2)
ERYTHROCYTE [DISTWIDTH] IN BLOOD BY AUTOMATED COUNT: 15.8 % (ref 12.3–15.4)
GFR SERPL CREATININE-BSD FRML MDRD: 57 ML/MIN/1.73
GLOBULIN UR ELPH-MCNC: 3.8 GM/DL
GLUCOSE SERPL-MCNC: 120 MG/DL (ref 65–99)
GLUCOSE UR STRIP-MCNC: NEGATIVE MG/DL
HCT VFR BLD AUTO: 41.5 % (ref 34–46.6)
HGB BLD-MCNC: 14 G/DL (ref 12–15.9)
HGB UR QL STRIP.AUTO: NEGATIVE
INR PPP: 1.08 (ref 0.93–1.1)
KETONES UR QL STRIP: ABNORMAL
LEUKOCYTE ESTERASE UR QL STRIP.AUTO: NEGATIVE
LIPASE SERPL-CCNC: 12 U/L (ref 13–60)
LYMPHOCYTES # BLD AUTO: 2 10*3/MM3 (ref 0.7–3.1)
LYMPHOCYTES NFR BLD AUTO: 22.4 % (ref 19.6–45.3)
MAGNESIUM SERPL-MCNC: 1.9 MG/DL (ref 1.6–2.4)
MCH RBC QN AUTO: 28.9 PG (ref 26.6–33)
MCHC RBC AUTO-ENTMCNC: 33.7 G/DL (ref 31.5–35.7)
MCV RBC AUTO: 85.7 FL (ref 79–97)
MONOCYTES # BLD AUTO: 0.9 10*3/MM3 (ref 0.1–0.9)
MONOCYTES NFR BLD AUTO: 9.8 % (ref 5–12)
NEUTROPHILS NFR BLD AUTO: 5.7 10*3/MM3 (ref 1.7–7)
NEUTROPHILS NFR BLD AUTO: 65.6 % (ref 42.7–76)
NITRITE UR QL STRIP: NEGATIVE
NRBC BLD AUTO-RTO: 0.1 /100 WBC (ref 0–0.2)
NT-PROBNP SERPL-MCNC: 2469 PG/ML (ref 0–1800)
PH UR STRIP.AUTO: 5.5 [PH] (ref 5–8)
PLATELET # BLD AUTO: 365 10*3/MM3 (ref 140–450)
PMV BLD AUTO: 8.4 FL (ref 6–12)
POTASSIUM SERPL-SCNC: 3 MMOL/L (ref 3.5–5.2)
PROT SERPL-MCNC: 8.2 G/DL (ref 6–8.5)
PROT UR QL STRIP: NEGATIVE
PROTHROMBIN TIME: 11.9 SECONDS (ref 9.6–11.7)
RBC # BLD AUTO: 4.84 10*6/MM3 (ref 3.77–5.28)
SARS-COV-2 RNA PNL SPEC NAA+PROBE: NOT DETECTED
SODIUM SERPL-SCNC: 141 MMOL/L (ref 136–145)
SP GR UR STRIP: 1.01 (ref 1–1.03)
TROPONIN T SERPL-MCNC: <0.01 NG/ML (ref 0–0.03)
UROBILINOGEN UR QL STRIP: ABNORMAL
WBC # BLD AUTO: 8.7 10*3/MM3 (ref 3.4–10.8)

## 2021-07-25 PROCEDURE — 99220 PR INITIAL OBSERVATION CARE/DAY 70 MINUTES: CPT | Performed by: INTERNAL MEDICINE

## 2021-07-25 PROCEDURE — 93005 ELECTROCARDIOGRAM TRACING: CPT | Performed by: EMERGENCY MEDICINE

## 2021-07-25 PROCEDURE — U0003 INFECTIOUS AGENT DETECTION BY NUCLEIC ACID (DNA OR RNA); SEVERE ACUTE RESPIRATORY SYNDROME CORONAVIRUS 2 (SARS-COV-2) (CORONAVIRUS DISEASE [COVID-19]), AMPLIFIED PROBE TECHNIQUE, MAKING USE OF HIGH THROUGHPUT TECHNOLOGIES AS DESCRIBED BY CMS-2020-01-R: HCPCS | Performed by: EMERGENCY MEDICINE

## 2021-07-25 PROCEDURE — 96366 THER/PROPH/DIAG IV INF ADDON: CPT

## 2021-07-25 PROCEDURE — 85025 COMPLETE CBC W/AUTO DIFF WBC: CPT | Performed by: EMERGENCY MEDICINE

## 2021-07-25 PROCEDURE — 83735 ASSAY OF MAGNESIUM: CPT | Performed by: EMERGENCY MEDICINE

## 2021-07-25 PROCEDURE — C9803 HOPD COVID-19 SPEC COLLECT: HCPCS

## 2021-07-25 PROCEDURE — 71045 X-RAY EXAM CHEST 1 VIEW: CPT

## 2021-07-25 PROCEDURE — 96376 TX/PRO/DX INJ SAME DRUG ADON: CPT

## 2021-07-25 PROCEDURE — 80053 COMPREHEN METABOLIC PANEL: CPT | Performed by: EMERGENCY MEDICINE

## 2021-07-25 PROCEDURE — 85610 PROTHROMBIN TIME: CPT | Performed by: EMERGENCY MEDICINE

## 2021-07-25 PROCEDURE — 83690 ASSAY OF LIPASE: CPT | Performed by: EMERGENCY MEDICINE

## 2021-07-25 PROCEDURE — 25010000002 MORPHINE PER 10 MG: Performed by: EMERGENCY MEDICINE

## 2021-07-25 PROCEDURE — 25010000003 POTASSIUM CHLORIDE 10 MEQ/100ML SOLUTION: Performed by: EMERGENCY MEDICINE

## 2021-07-25 PROCEDURE — 83880 ASSAY OF NATRIURETIC PEPTIDE: CPT | Performed by: EMERGENCY MEDICINE

## 2021-07-25 PROCEDURE — 25010000002 ONDANSETRON PER 1 MG: Performed by: EMERGENCY MEDICINE

## 2021-07-25 PROCEDURE — 96365 THER/PROPH/DIAG IV INF INIT: CPT

## 2021-07-25 PROCEDURE — P9612 CATHETERIZE FOR URINE SPEC: HCPCS

## 2021-07-25 PROCEDURE — 81003 URINALYSIS AUTO W/O SCOPE: CPT | Performed by: EMERGENCY MEDICINE

## 2021-07-25 PROCEDURE — 96375 TX/PRO/DX INJ NEW DRUG ADDON: CPT

## 2021-07-25 PROCEDURE — 96368 THER/DIAG CONCURRENT INF: CPT

## 2021-07-25 PROCEDURE — 25010000002 DIGOXIN PER 500 MCG: Performed by: INTERNAL MEDICINE

## 2021-07-25 PROCEDURE — 85730 THROMBOPLASTIN TIME PARTIAL: CPT | Performed by: EMERGENCY MEDICINE

## 2021-07-25 PROCEDURE — 36415 COLL VENOUS BLD VENIPUNCTURE: CPT | Performed by: EMERGENCY MEDICINE

## 2021-07-25 PROCEDURE — 93970 EXTREMITY STUDY: CPT

## 2021-07-25 PROCEDURE — 99285 EMERGENCY DEPT VISIT HI MDM: CPT

## 2021-07-25 PROCEDURE — 74176 CT ABD & PELVIS W/O CONTRAST: CPT

## 2021-07-25 PROCEDURE — 84484 ASSAY OF TROPONIN QUANT: CPT | Performed by: EMERGENCY MEDICINE

## 2021-07-25 RX ORDER — LIDOCAINE 50 MG/G
1 PATCH TOPICAL DAILY PRN
COMMUNITY
End: 2022-07-26

## 2021-07-25 RX ORDER — MORPHINE SULFATE 4 MG/ML
4 INJECTION, SOLUTION INTRAMUSCULAR; INTRAVENOUS ONCE
Status: COMPLETED | OUTPATIENT
Start: 2021-07-25 | End: 2021-07-25

## 2021-07-25 RX ORDER — SODIUM CHLORIDE 0.9 % (FLUSH) 0.9 %
10 SYRINGE (ML) INJECTION EVERY 12 HOURS SCHEDULED
Status: DISCONTINUED | OUTPATIENT
Start: 2021-07-25 | End: 2021-07-27 | Stop reason: HOSPADM

## 2021-07-25 RX ORDER — FUROSEMIDE 40 MG/1
40 TABLET ORAL 2 TIMES DAILY
Status: DISCONTINUED | OUTPATIENT
Start: 2021-07-25 | End: 2021-07-27 | Stop reason: HOSPADM

## 2021-07-25 RX ORDER — GABAPENTIN 600 MG/1
600 TABLET ORAL 2 TIMES DAILY
Status: DISCONTINUED | OUTPATIENT
Start: 2021-07-26 | End: 2021-07-27 | Stop reason: HOSPADM

## 2021-07-25 RX ORDER — DIGOXIN 0.25 MG/ML
500 INJECTION INTRAMUSCULAR; INTRAVENOUS ONCE
Status: COMPLETED | OUTPATIENT
Start: 2021-07-25 | End: 2021-07-25

## 2021-07-25 RX ORDER — LEVOTHYROXINE SODIUM 0.07 MG/1
75 TABLET ORAL DAILY
Status: DISCONTINUED | OUTPATIENT
Start: 2021-07-26 | End: 2021-07-27 | Stop reason: HOSPADM

## 2021-07-25 RX ORDER — ROPINIROLE 0.25 MG/1
0.25 TABLET, FILM COATED ORAL NIGHTLY
Status: DISCONTINUED | OUTPATIENT
Start: 2021-07-25 | End: 2021-07-27 | Stop reason: HOSPADM

## 2021-07-25 RX ORDER — POTASSIUM CHLORIDE 1.5 G/1.77G
40 POWDER, FOR SOLUTION ORAL AS NEEDED
Status: DISCONTINUED | OUTPATIENT
Start: 2021-07-25 | End: 2021-07-27 | Stop reason: HOSPADM

## 2021-07-25 RX ORDER — HYDROCODONE BITARTRATE AND ACETAMINOPHEN 7.5; 325 MG/1; MG/1
1 TABLET ORAL 4 TIMES DAILY PRN
Status: DISCONTINUED | OUTPATIENT
Start: 2021-07-25 | End: 2021-07-27 | Stop reason: HOSPADM

## 2021-07-25 RX ORDER — METOPROLOL SUCCINATE 50 MG/1
50 TABLET, EXTENDED RELEASE ORAL DAILY
Status: DISCONTINUED | OUTPATIENT
Start: 2021-07-26 | End: 2021-07-27 | Stop reason: HOSPADM

## 2021-07-25 RX ORDER — POTASSIUM CHLORIDE 20 MEQ/1
40 TABLET, EXTENDED RELEASE ORAL AS NEEDED
Status: DISCONTINUED | OUTPATIENT
Start: 2021-07-25 | End: 2021-07-27 | Stop reason: HOSPADM

## 2021-07-25 RX ORDER — POTASSIUM CHLORIDE 7.45 MG/ML
10 INJECTION INTRAVENOUS ONCE
Status: COMPLETED | OUTPATIENT
Start: 2021-07-25 | End: 2021-07-25

## 2021-07-25 RX ORDER — PANTOPRAZOLE SODIUM 40 MG/10ML
40 INJECTION, POWDER, LYOPHILIZED, FOR SOLUTION INTRAVENOUS
Status: DISCONTINUED | OUTPATIENT
Start: 2021-07-25 | End: 2021-07-26

## 2021-07-25 RX ORDER — DIGOXIN 0.25 MG/ML
250 INJECTION INTRAMUSCULAR; INTRAVENOUS EVERY 6 HOURS
Status: COMPLETED | OUTPATIENT
Start: 2021-07-26 | End: 2021-07-26

## 2021-07-25 RX ORDER — SILDENAFIL CITRATE 20 MG/1
20 TABLET ORAL 2 TIMES DAILY
Status: DISCONTINUED | OUTPATIENT
Start: 2021-07-25 | End: 2021-07-27 | Stop reason: HOSPADM

## 2021-07-25 RX ORDER — ACETAMINOPHEN 160 MG/5ML
325 SOLUTION ORAL EVERY 4 HOURS PRN
Status: DISCONTINUED | OUTPATIENT
Start: 2021-07-25 | End: 2021-07-27 | Stop reason: HOSPADM

## 2021-07-25 RX ORDER — ACETAMINOPHEN 650 MG/1
650 SUPPOSITORY RECTAL EVERY 4 HOURS PRN
Status: DISCONTINUED | OUTPATIENT
Start: 2021-07-25 | End: 2021-07-27 | Stop reason: HOSPADM

## 2021-07-25 RX ORDER — ACETAMINOPHEN 325 MG/1
325 TABLET ORAL EVERY 4 HOURS PRN
Status: DISCONTINUED | OUTPATIENT
Start: 2021-07-25 | End: 2021-07-27 | Stop reason: HOSPADM

## 2021-07-25 RX ORDER — LIDOCAINE 50 MG/G
1 PATCH TOPICAL
Status: DISCONTINUED | OUTPATIENT
Start: 2021-07-26 | End: 2021-07-27 | Stop reason: HOSPADM

## 2021-07-25 RX ORDER — SODIUM CHLORIDE 0.9 % (FLUSH) 0.9 %
10 SYRINGE (ML) INJECTION AS NEEDED
Status: DISCONTINUED | OUTPATIENT
Start: 2021-07-25 | End: 2021-07-27 | Stop reason: HOSPADM

## 2021-07-25 RX ORDER — DIGOXIN 0.25 MG/ML
50 INJECTION INTRAMUSCULAR; INTRAVENOUS ONCE
Status: DISCONTINUED | OUTPATIENT
Start: 2021-07-25 | End: 2021-07-25

## 2021-07-25 RX ORDER — ONDANSETRON 2 MG/ML
4 INJECTION INTRAMUSCULAR; INTRAVENOUS ONCE
Status: COMPLETED | OUTPATIENT
Start: 2021-07-25 | End: 2021-07-25

## 2021-07-25 RX ORDER — TRAZODONE HYDROCHLORIDE 50 MG/1
25 TABLET ORAL NIGHTLY
Status: DISCONTINUED | OUTPATIENT
Start: 2021-07-25 | End: 2021-07-27 | Stop reason: HOSPADM

## 2021-07-25 RX ORDER — POTASSIUM CHLORIDE 7.45 MG/ML
10 INJECTION INTRAVENOUS
Status: DISCONTINUED | OUTPATIENT
Start: 2021-07-25 | End: 2021-07-27 | Stop reason: HOSPADM

## 2021-07-25 RX ADMIN — POTASSIUM CHLORIDE 10 MEQ: 7.46 INJECTION, SOLUTION INTRAVENOUS at 17:38

## 2021-07-25 RX ADMIN — SERTRALINE HYDROCHLORIDE 50 MG: 50 TABLET ORAL at 23:57

## 2021-07-25 RX ADMIN — DIGOXIN 250 MCG: 250 INJECTION, SOLUTION INTRAMUSCULAR; INTRAVENOUS at 23:57

## 2021-07-25 RX ADMIN — ONDANSETRON 4 MG: 2 INJECTION INTRAMUSCULAR; INTRAVENOUS at 13:23

## 2021-07-25 RX ADMIN — SODIUM CHLORIDE 500 ML: 0.9 INJECTION, SOLUTION INTRAVENOUS at 14:32

## 2021-07-25 RX ADMIN — ROPINIROLE HYDROCHLORIDE 0.25 MG: 0.25 TABLET, FILM COATED ORAL at 23:57

## 2021-07-25 RX ADMIN — TRAZODONE HYDROCHLORIDE 25 MG: 50 TABLET ORAL at 23:57

## 2021-07-25 RX ADMIN — SODIUM CHLORIDE 5 MG/HR: 900 INJECTION, SOLUTION INTRAVENOUS at 13:38

## 2021-07-25 RX ADMIN — MORPHINE SULFATE 4 MG: 4 INJECTION INTRAVENOUS at 13:24

## 2021-07-25 RX ADMIN — PANTOPRAZOLE SODIUM 40 MG: 40 INJECTION, POWDER, FOR SOLUTION INTRAVENOUS at 18:21

## 2021-07-25 RX ADMIN — Medication 10 ML: at 23:57

## 2021-07-25 RX ADMIN — DIGOXIN 500 MCG: 250 INJECTION, SOLUTION INTRAMUSCULAR; INTRAVENOUS at 18:16

## 2021-07-25 RX ADMIN — APIXABAN 5 MG: 5 TABLET, FILM COATED ORAL at 23:57

## 2021-07-25 RX ADMIN — SILDENAFIL 20 MG: 20 TABLET, FILM COATED ORAL at 23:57

## 2021-07-25 RX ADMIN — SODIUM CHLORIDE 10 MG/HR: 900 INJECTION, SOLUTION INTRAVENOUS at 20:38

## 2021-07-25 RX ADMIN — HYDROCODONE BITARTRATE AND ACETAMINOPHEN 1 TABLET: 7.5; 325 TABLET ORAL at 23:57

## 2021-07-25 RX ADMIN — FUROSEMIDE 40 MG: 40 TABLET ORAL at 23:57

## 2021-07-25 NOTE — ED NOTES
Pt c/o IV irritating her in her left hand. New IV started in left AC. Medication transferred to Left AC. Pt agreed to allow INT to stay in place in left hand at this time.      Yue Moreno, RN  07/25/21 6622

## 2021-07-25 NOTE — H&P
[facility] Medicine Services      Patient Name: Catalina Moreno  : 1940  MRN: [id number]  Primary Care Physician:  Anayeli Costa APRN  Date of admission: 2021      Subjective      Chief Complaint: Nausea and diarrhea    History of Present Illness: Catalina Moreno is a 81 y.o. female who presented to [facility] on 2021 complaining of Chief complaint: Patient is an 81-year-old female.  She states she has been in bed for a week.  She states she can eat or cannot drink.  She has upper abdominal pain.  She states however she has taken all of her meds appropriately.  She has not felt tachycardia or palpitations or chest pain or shortness of breath.  No cough or fever.  She states that the symptoms above are the only symptoms that she is having.  No other symptoms.     Context: Nondescript.  Noted as above.       Duration: 7 days     Timing: Persistent     Severity: Rates her symptoms as severe     Associated Symptoms: Negative symptoms noted above.     She is on anticoagulation for atrial fibrillation.    Dr. Alcantara      Review of Systems   Constitutional: Negative.   HENT: Negative.    Eyes: Negative.    Cardiovascular: Positive for palpitations.   Respiratory: Negative.    Endocrine: Negative.    Hematologic/Lymphatic: Negative.    Skin: Negative.    Musculoskeletal: Negative.    Gastrointestinal: Positive for diarrhea and nausea.   Genitourinary: Negative.    Neurological: Negative.    Psychiatric/Behavioral: Negative.    Allergic/Immunologic: Negative.    All other systems reviewed and are negative.       Personal History     Past Medical History:   Diagnosis Date   • Anxiety    • Arthritis    • Atrial fibrillation (CMS/HCC)     paroxysmal   • Broken shoulder     left-- due to fall 19 was at [facility]   • Buttock pain     rt side   • DDD (degenerative disc disease), lumbar    • Depression    • Edema     2020 foot   • GERD (gastroesophageal reflux disease)    •  H/O fall    • Hip pain     rt   • Hypertension    • Hypothyroidism    • Insomnia    • Leg pain     rt   • Low back pain    • Neuropathy    • PONV (postoperative nausea and vomiting)    • Pulmonary hypertension (CMS/HCC)    • Radiculopathy    • Restless legs    • Spondylolisthesis    • Urinary incontinence        Past Surgical History:   Procedure Laterality Date   • BACK SURGERY  07/19/2018    PDC &  PSF L3-L5 insitu   • CARDIAC CATHETERIZATION N/A 4/2/2021    Procedure: Cardiac Catheterization/Vascular Study;  Surgeon: Barrett Evans MD;  Location: [facility] CATH INVASIVE LOCATION;  Service: Cardiovascular;  Laterality: N/A;   • CHOLECYSTECTOMY     • COLONOSCOPY     • HYSTERECTOMY     • ROTATOR CUFF REPAIR Left        Family History: family history includes Cancer in her father, paternal aunt, and paternal uncle; Diabetes in her son; Heart disease in her paternal aunt. Otherwise pertinent FHx was reviewed and not pertinent to current issue.    Social History:  reports that she has never smoked. She has never used smokeless tobacco. She reports that she does not drink alcohol and does not use drugs.    Home Medications:  Prior to Admission Medications     Prescriptions Last Dose Informant Patient Reported? Taking?    apixaban (ELIQUIS) 5 MG tablet tablet Past Week  No Yes    Take 1 tablet by mouth Every 12 (Twelve) Hours.    famotidine (PEPCID) 20 MG tablet Past Week  Yes Yes    Take 20 mg by mouth Daily.    furosemide (LASIX) 40 MG tablet Past Week  Yes Yes    Take 40 mg by mouth 2 (Two) Times a Day. Uses for leg swelling    gabapentin (NEURONTIN) 600 MG tablet Past Week  No Yes    Take 1 tablet by mouth 2 (Two) Times a Day.    HYDROcodone-acetaminophen (NORCO) 7.5-325 MG per tablet Past Week  No Yes    Take 1 tablet by mouth 4 (Four) Times a Day As Needed for Severe Pain . DNF before 6/29/2021    levothyroxine (SYNTHROID, LEVOTHROID) 75 MCG tablet Past Week  Yes Yes    Take 75 mcg by mouth Daily. Take  preop    lidocaine (LIDODERM) 5 % Past Week  No Yes    UNWRAP AND APPLY 2 PATCH(ES) ON THE SKIN DAILY AS DIRECTED BY PROVIDER. REMOVE AND DISCARD PATCH(ES) WITHIN 12 HOURS OR AS DIRECTED BY MD    metoprolol succinate XL (TOPROL-XL) 50 MG 24 hr tablet Past Week  No Yes    TAKE 1 TABLET BY MOUTH ONCE DAILY    pantoprazole (PROTONIX) 40 MG EC tablet Past Week  Yes Yes    Take 40 mg by mouth Daily. Afternoons    rOPINIRole (REQUIP) 0.25 MG tablet Past Week  No Yes    Take 1 tablet by mouth Every Night. Take 1 hour before bedtime    sertraline (ZOLOFT) 50 MG tablet Past Week  Yes Yes    Take 50 mg by mouth Every Night.    sildenafil (REVATIO) 20 MG tablet Past Week  Yes Yes    Take 20 mg by mouth 2 (Two) Times a Day.    traZODone (DESYREL) 50 MG tablet Past Week  Yes Yes    Take 25 mg by mouth Every Night.            Allergies:  Allergies   Allergen Reactions   • Baclofen Rash   • Codeine Nausea Only   • Ibuprofen Unknown (See Comments)     Patient doesn't know----Motin   • Methocarbamol Unknown (See Comments)     Patient doesn't know   • Naproxen Unknown (See Comments)     Pt. Doesn't know    • Tizanidine Hcl Hallucinations   • Tolmetin Dizziness     Pt. Doesn't know-- same as Tolectin       Objective      Vitals:   Temp:  [97.4 °F (36.3 °C)] 97.4 °F (36.3 °C)  Heart Rate:  [] 90  Resp:  [16-18] 18  BP: (100-177)/() 127/60  Flow (L/min):  [2] 2    Physical Exam  Vitals and nursing note reviewed.   Constitutional:       General: She is not in acute distress.     Appearance: Normal appearance. She is well-developed. She is not ill-appearing, toxic-appearing or diaphoretic.   HENT:      Head: Normocephalic and atraumatic.      Right Ear: Ear canal and external ear normal.      Left Ear: Ear canal and external ear normal.      Nose: Nose normal. No congestion or rhinorrhea.      Mouth/Throat:      Mouth: Mucous membranes are moist.      Pharynx: No oropharyngeal exudate.   Eyes:      General: No scleral icterus.         Right eye: No discharge.         Left eye: No discharge.      Extraocular Movements: Extraocular movements intact.      Conjunctiva/sclera: Conjunctivae normal.      Pupils: Pupils are equal, round, and reactive to light.   Neck:      Thyroid: No thyromegaly.      Vascular: No carotid bruit or JVD.      Trachea: No tracheal deviation.   Cardiovascular:      Rate and Rhythm: Normal rate. Rhythm irregular.      Pulses: Normal pulses.      Heart sounds: Normal heart sounds. No murmur heard.   No friction rub. No gallop.    Pulmonary:      Effort: Pulmonary effort is normal. No respiratory distress.      Breath sounds: Normal breath sounds. No stridor. No wheezing, rhonchi or rales.   Chest:      Chest wall: No tenderness.   Abdominal:      General: Bowel sounds are normal. There is no distension.      Palpations: Abdomen is soft. There is no mass.      Tenderness: There is abdominal tenderness (Epigastric). There is no guarding or rebound.      Hernia: No hernia is present.   Musculoskeletal:         General: No swelling, tenderness, deformity or signs of injury. Normal range of motion.      Cervical back: Normal range of motion and neck supple. No rigidity. No muscular tenderness.      Right lower leg: No edema.      Left lower leg: No edema.   Lymphadenopathy:      Cervical: No cervical adenopathy.   Skin:     General: Skin is warm and dry.      Coloration: Skin is not jaundiced or pale.      Findings: No bruising, erythema or rash.   Neurological:      General: No focal deficit present.      Mental Status: She is alert and oriented to person, place, and time. Mental status is at baseline.      Cranial Nerves: No cranial nerve deficit.      Sensory: No sensory deficit.      Motor: No weakness or abnormal muscle tone.      Coordination: Coordination normal.   Psychiatric:         Mood and Affect: Mood normal.         Behavior: Behavior normal.         Thought Content: Thought content normal.         Judgment:  Judgment normal.          Result Review    Result Review:  I have personally reviewed the results from the time of this admission to 7/25/2021 18:26 EDT and agree with these findings:  [x]  Laboratory  []  Microbiology  [x]  Radiology  [x]  EKG/Telemetry   []  Cardiology/Vascular   []  Pathology  []  Old records  []  Other:  Most notable findings include:       Assessment/Plan        Active Hospital Problems:  Active Hospital Problems    Diagnosis    • **Atrial fibrillation with rapid ventricular response (CMS/HCC)    • Pulmonary hypertension (CMS/HCC)    • Acute on chronic diastolic congestive heart failure (CMS/HCC)    • Essential hypertension    • Hypothyroidism      Plan:     Atrial fibrillation:   Rate control  Beta-blockers  Calcium channel blockers  Digoxin  Anticoagulation per CHADS VASC2 SCORE  Close monitoring      CHF:  Diuresis-loop diuretics  Spironolactone if tolerated  Thiazides if tolerated  ACE inhibitor if tolerated  Beta-blocker  Possible SGLT2 inhibitor trial if EF<40% (EMPEROR reduced)  Check echocardiogram      Hypertension:  Continue home medications   Options include-  beta-blockers  Calcium channel blockers  ACE inhibitor  Vasodilators  Low-dose diuretics  PRN medications have not been shown to affect outcomes-to be avoided      Rule out peptic ulcer disease  Consult gastroenterology for possible EGD    DVT prophylaxis:  No DVT prophylaxis order currently exists.    CODE STATUS:    Code Status: CPR  Medical Interventions (Level of Support Prior to Arrest): Full    Admission Status:  I believe this patient meets inpatient status.    I discussed the patient's findings and my recommendations with patient, nursing staff and consulting provider.    This patient has been examined wearing appropriate Personal Protective Equipment . 07/25/21      Signature: Reno Roman MD, FACP

## 2021-07-25 NOTE — ED NOTES
"Pt's HR is currently 163. Pt reports \"it runs high when I get to feeling bad.\" Pt reports that she had these same symptoms last month, but was unable to get an appointment to see her doctor. Pt reports calling her PCP this week and was given an appointment for the end of the month.      Yue Moreno RN  07/25/21 1303       Yue Moreno RN  07/25/21 1307    "

## 2021-07-25 NOTE — ED NOTES
"Pt report that she has been \"sick at her stomach\" x7 days. She denies eating anything. \"Only been able to keep ice chips and water down.\" Pt denies actually vomiting.      Moreno, Yue, RN  07/25/21 8341    "

## 2021-07-25 NOTE — ED PROVIDER NOTES
Subjective   Chief complaint: Patient is an 81-year-old female.  She states she has been in bed for a week.  She states she can eat or cannot drink.  She has upper abdominal pain.  She states however she has taken all of her meds appropriately.  She has not felt tachycardia or palpitations or chest pain or shortness of breath.  No cough or fever.  She states that the symptoms above are the only symptoms that she is having.  No other symptoms.    Context: Nondescript.  Noted as above.      Duration: 7 days    Timing: Persistent    Severity: Rates her symptoms as severe    Associated Symptoms: Negative symptoms noted above.        PCP:            Review of Systems   Constitutional: Negative for fever.   Respiratory: Negative for shortness of breath.    Cardiovascular: Negative for chest pain and palpitations.   Gastrointestinal: Positive for abdominal pain and nausea. Negative for diarrhea.   All other systems reviewed and are negative.      Past Medical History:   Diagnosis Date   • Anxiety    • Arthritis    • Atrial fibrillation (CMS/HCC)     paroxysmal   • Broken shoulder     left-- due to fall 11-7-19 was at [facility]   • Buttock pain     rt side   • DDD (degenerative disc disease), lumbar    • Depression    • Edema     9/2020 foot   • GERD (gastroesophageal reflux disease)    • H/O fall    • Hip pain     rt   • Hypertension    • Hypothyroidism    • Insomnia    • Leg pain     rt   • Low back pain    • Neuropathy    • PONV (postoperative nausea and vomiting)    • Pulmonary hypertension (CMS/HCC)    • Radiculopathy    • Restless legs    • Spondylolisthesis    • Urinary incontinence        Allergies   Allergen Reactions   • Baclofen Rash   • Codeine Nausea Only   • Ibuprofen Unknown (See Comments)     Patient doesn't know----Motin   • Methocarbamol Unknown (See Comments)     Patient doesn't know   • Naproxen Unknown (See Comments)     Pt. Doesn't know    • Tizanidine Hcl Hallucinations   • Tolmetin Dizziness     Pt.  Doesn't know-- same as Tolectin       Past Surgical History:   Procedure Laterality Date   • BACK SURGERY  07/19/2018    PDC &  PSF L3-L5 insitu   • CARDIAC CATHETERIZATION N/A 4/2/2021    Procedure: Cardiac Catheterization/Vascular Study;  Surgeon: Barrett Evans MD;  [facility];  Service: Cardiovascular;  Laterality: N/A;   • CHOLECYSTECTOMY     • COLONOSCOPY     • HYSTERECTOMY     • ROTATOR CUFF REPAIR Left        Family History   Problem Relation Age of Onset   • Cancer Father    • Diabetes Son    • Cancer Paternal Aunt    • Heart disease Paternal Aunt    • Cancer Paternal Uncle        Social History     Socioeconomic History   • Marital status:      Spouse name: Not on file   • Number of children: Not on file   • Years of education: Not on file   • Highest education level: Not on file   Tobacco Use   • Smoking status: Never Smoker   • Smokeless tobacco: Never Used   Vaping Use   • Vaping Use: Never used   Substance and Sexual Activity   • Alcohol use: No   • Drug use: No   • Sexual activity: Defer           Objective   Physical Exam  Vitals and nursing note reviewed.   Constitutional:       General: She is in acute distress.   HENT:      Head: Normocephalic and atraumatic.   Eyes:      Pupils: Pupils are equal, round, and reactive to light.   Cardiovascular:      Rate and Rhythm: Tachycardia present. Rhythm irregular.      Pulses: Normal pulses.   Abdominal:      General: Bowel sounds are normal.      Tenderness: There is abdominal tenderness in the right upper quadrant, epigastric area and periumbilical area. There is guarding. There is no rebound.       Musculoskeletal:         General: Normal range of motion.      Cervical back: Neck supple.   Skin:     General: Skin is warm and dry.      Capillary Refill: Capillary refill takes less than 2 seconds.   Neurological:      General: No focal deficit present.      Mental Status: She is alert and oriented to person,  place, and time.   Psychiatric:         Mood and Affect: Mood normal.         Thought Content: Thought content normal.         Judgment: Judgment normal.         Procedures           ED Course  ED Course as of Jul 25 1653   Sun Jul 25, 2021   1429 XR Chest 1 View [LH]      ED Course User Index  [LH] Amadotheresa Keyur Case DO            Results for orders placed or performed during the hospital encounter of 07/25/21   Comprehensive Metabolic Panel    Specimen: Blood   Result Value Ref Range    Glucose 120 (H) 65 - 99 mg/dL    BUN 17 8 - 23 mg/dL    Creatinine 0.94 0.57 - 1.00 mg/dL    Sodium 141 136 - 145 mmol/L    Potassium 3.0 (L) 3.5 - 5.2 mmol/L    Chloride 99 98 - 107 mmol/L    CO2 26.0 22.0 - 29.0 mmol/L    Calcium 9.4 8.6 - 10.5 mg/dL    Total Protein 8.2 6.0 - 8.5 g/dL    Albumin 4.40 3.50 - 5.20 g/dL    ALT (SGPT) 13 1 - 33 U/L    AST (SGOT) 22 1 - 32 U/L    Alkaline Phosphatase 103 39 - 117 U/L    Total Bilirubin 0.9 0.0 - 1.2 mg/dL    eGFR Non African Amer 57 (L) >60 mL/min/1.73    Globulin 3.8 gm/dL    A/G Ratio 1.2 g/dL    BUN/Creatinine Ratio 18.1 7.0 - 25.0    Anion Gap 16.0 (H) 5.0 - 15.0 mmol/L   Protime-INR    Specimen: Blood   Result Value Ref Range    Protime 11.9 (H) 9.6 - 11.7 Seconds    INR 1.08 0.93 - 1.10   aPTT    Specimen: Blood   Result Value Ref Range    PTT 29.4 24.0 - 31.0 seconds   Urinalysis With Culture If Indicated - Urine, Catheter    Specimen: Urine, Catheter   Result Value Ref Range    Color, UA Yellow Yellow, Straw    Appearance, UA Clear Clear    pH, UA 5.5 5.0 - 8.0    Specific Gravity, UA 1.015 1.005 - 1.030    Glucose, UA Negative Negative    Ketones, UA Trace (A) Negative    Bilirubin, UA Small (1+) (A) Negative    Blood, UA Negative Negative    Protein, UA Negative Negative    Leuk Esterase, UA Negative Negative    Nitrite, UA Negative Negative    Urobilinogen, UA 0.2 E.U./dL 0.2 - 1.0 E.U./dL   Troponin    Specimen: Blood   Result Value Ref Range    Troponin T <0.010  0.000 - 0.030 ng/mL   BNP    Specimen: Blood   Result Value Ref Range    proBNP 2,469.0 (H) 0.0-1,800.0 pg/mL   CBC Auto Differential    Specimen: Blood   Result Value Ref Range    WBC 8.70 3.40 - 10.80 10*3/mm3    RBC 4.84 3.77 - 5.28 10*6/mm3    Hemoglobin 14.0 12.0 - 15.9 g/dL    Hematocrit 41.5 34.0 - 46.6 %    MCV 85.7 79.0 - 97.0 fL    MCH 28.9 26.6 - 33.0 pg    MCHC 33.7 31.5 - 35.7 g/dL    RDW 15.8 (H) 12.3 - 15.4 %    RDW-SD 48.1 37.0 - 54.0 fl    MPV 8.4 6.0 - 12.0 fL    Platelets 365 140 - 450 10*3/mm3    Neutrophil % 65.6 42.7 - 76.0 %    Lymphocyte % 22.4 19.6 - 45.3 %    Monocyte % 9.8 5.0 - 12.0 %    Eosinophil % 0.4 0.3 - 6.2 %    Basophil % 1.8 (H) 0.0 - 1.5 %    Neutrophils, Absolute 5.70 1.70 - 7.00 10*3/mm3    Lymphocytes, Absolute 2.00 0.70 - 3.10 10*3/mm3    Monocytes, Absolute 0.90 0.10 - 0.90 10*3/mm3    Eosinophils, Absolute 0.00 0.00 - 0.40 10*3/mm3    Basophils, Absolute 0.20 0.00 - 0.20 10*3/mm3    nRBC 0.1 0.0 - 0.2 /100 WBC   Lipase    Specimen: Blood   Result Value Ref Range    Lipase 12 (L) 13 - 60 U/L   Magnesium    Specimen: Blood   Result Value Ref Range    Magnesium 1.9 1.6 - 2.4 mg/dL   ECG 12 Lead   Result Value Ref Range    QT Interval 289 ms   Duplex Venous Lower Extremity - Bilateral   Result Value Ref Range    Right Common Femoral Spont Y     Right Common Femoral Phasic Y     Right Common Femoral Augment Y     Right Common Femoral Competent Y     Right Common Femoral Compress C     Right Saphenofemoral Junction Compress C     Right Proximal Femoral Compress C     Right Mid Femoral Spont Y     Right Mid Femoral Phasic Y     Right Mid Femoral Augment Y     Right Mid Femoral Competent Y     Right Mid Femoral Compress C     Right Distal Femoral Compress C     Right Popliteal Spont Y     Right Popliteal Phasic Y     Right Popliteal Augment Y     Right Popliteal Competent Y     Right Popliteal Compress C     Right Posterior Tibial Compress C     Right Peroneal Compress C     Right  GastronemiusSoleal Compress C     Right Greater Saph AK Compress C     Right Greater Saph BK Compress C     Right Lesser Saph Compress C     Left Common Femoral Spont Y     Left Common Femoral Phasic Y     Left Common Femoral Augment Y     Left Common Femoral Competent Y     Left Common Femoral Compress C     Left Saphenofemoral Junction Compress C     Left Proximal Femoral Compress C     Left Mid Femoral Spont Y     Left Mid Femoral Phasic Y     Left Mid Femoral Augment Y     Left Mid Femoral Competent Y     Left Mid Femoral Compress C     Left Distal Femoral Compress C     Left Popliteal Spont Y     Left Popliteal Phasic Y     Left Popliteal Augment Y     Left Popliteal Competent Y     Left Popliteal Compress C     Left Posterior Tibial Compress C     Left Peroneal Compress C     Left GastronemiusSoleal Compress C     Left Greater Saph AK Compress C     Left Greater Saph BK Compress C     Left Lesser Saph Compress C     BH CV POP FLUID COLLECT LEFT 1             CT Abdomen Pelvis Without Contrast    Result Date: 7/25/2021  1. No acute abnormality is identified. 2. The appendix is not delineated. No secondary signs of appendicitis. 3. Additional findings as reported above.  Electronically Signed By-Maia Finney MD On:7/25/2021 4:46 PM This report was finalized on [id number] by  Maia Finney MD.    XR Chest 1 View    Result Date: 7/25/2021  1.  No evidence of active disease.   Electronically Signed By-Maia Finney MD On:7/25/2021 1:58 PM This report was finalized on [id number] by  Maia Finney MD.                               MDM  Number of Diagnoses or Management Options  Diagnosis management comments: Patient's heart rate eventually was able to be controlled. First difficult to get her heart rate below 150s. She is on Cardizem drip and remains on that. CT scan abdomen pelvis shows no acute process. She is nauseous and sick to her stomach. She received some IV fluids and nausea medicine. This point  time patient will come in to the hospital to the PCU on Cardizem drip. She verbalizes understanding is okay with this.       Amount and/or Complexity of Data Reviewed  Clinical lab tests: reviewed  Tests in the radiology section of CPT®: reviewed  Tests in the medicine section of CPT®: reviewed  Discussion of test results with the performing providers: yes  Discuss the patient with other providers: yes  Independent visualization of images, tracings, or specimens: yes    Risk of Complications, Morbidity, and/or Mortality  Presenting problems: high    Patient Progress  Patient progress: other (comment)      Final diagnoses:   None   A. fib with RVR  Abdominal pain  Nausea and vomiting    ED Disposition  ED Disposition     None          No follow-up provider specified.       Medication List      ASK your doctor about these medications    HYDROcodone-acetaminophen 7.5-325 MG per tablet  Commonly known as: NORCO  Take 1 tablet by mouth 4 (Four) Times a Day As Needed for Severe Pain . DNF before 6/29/2021  Ask about: Which instructions should I use?             Keyur Hernandez, DO  07/25/21 0199

## 2021-07-26 ENCOUNTER — ON CAMPUS - OUTPATIENT (AMBULATORY)
Dept: URBAN - METROPOLITAN AREA HOSPITAL 85 | Facility: HOSPITAL | Age: 81
End: 2021-07-26
Payer: MEDICARE

## 2021-07-26 ENCOUNTER — APPOINTMENT (OUTPATIENT)
Dept: CARDIOLOGY | Facility: HOSPITAL | Age: 81
End: 2021-07-26

## 2021-07-26 DIAGNOSIS — R11.0 NAUSEA: ICD-10-CM

## 2021-07-26 DIAGNOSIS — R10.13 EPIGASTRIC PAIN: ICD-10-CM

## 2021-07-26 DIAGNOSIS — Z79.01 LONG TERM (CURRENT) USE OF ANTICOAGULANTS: ICD-10-CM

## 2021-07-26 DIAGNOSIS — I48.91 UNSPECIFIED ATRIAL FIBRILLATION: ICD-10-CM

## 2021-07-26 LAB
ANION GAP SERPL CALCULATED.3IONS-SCNC: 14 MMOL/L (ref 5–15)
BASOPHILS # BLD AUTO: 0.1 10*3/MM3 (ref 0–0.2)
BASOPHILS NFR BLD AUTO: 1.2 % (ref 0–1.5)
BH CV ECHO MEAS - ACS: 2 CM
BH CV ECHO MEAS - AI DEC SLOPE: 261.2 CM/SEC^2
BH CV ECHO MEAS - AI DEC TIME: 1.3 SEC
BH CV ECHO MEAS - AI MAX PG: 44.5 MMHG
BH CV ECHO MEAS - AI MAX VEL: 333.7 CM/SEC
BH CV ECHO MEAS - AI P1/2T: 374.3 MSEC
BH CV ECHO MEAS - AO MAX PG (FULL): 0.16 MMHG
BH CV ECHO MEAS - AO MAX PG: 6.5 MMHG
BH CV ECHO MEAS - AO MEAN PG (FULL): 0.22 MMHG
BH CV ECHO MEAS - AO MEAN PG: 3.9 MMHG
BH CV ECHO MEAS - AO ROOT AREA (BSA CORRECTED): 1.9
BH CV ECHO MEAS - AO ROOT AREA: 9.4 CM^2
BH CV ECHO MEAS - AO ROOT DIAM: 3.5 CM
BH CV ECHO MEAS - AO V2 MAX: 127.1 CM/SEC
BH CV ECHO MEAS - AO V2 MEAN: 93.6 CM/SEC
BH CV ECHO MEAS - AO V2 VTI: 18.3 CM
BH CV ECHO MEAS - ASC AORTA: 3.5 CM
BH CV ECHO MEAS - AVA(I,A): 2.7 CM^2
BH CV ECHO MEAS - AVA(I,D): 2.7 CM^2
BH CV ECHO MEAS - AVA(V,A): 2.6 CM^2
BH CV ECHO MEAS - AVA(V,D): 2.6 CM^2
BH CV ECHO MEAS - BSA(HAYCOCK): 1.9 M^2
BH CV ECHO MEAS - BSA: 1.8 M^2
BH CV ECHO MEAS - BZI_BMI: 35.5 KILOGRAMS/M^2
BH CV ECHO MEAS - BZI_METRIC_HEIGHT: 152.4 CM
BH CV ECHO MEAS - BZI_METRIC_WEIGHT: 82.6 KG
BH CV ECHO MEAS - EDV(CUBED): 35.6 ML
BH CV ECHO MEAS - EDV(MOD-SP4): 47 ML
BH CV ECHO MEAS - EDV(TEICH): 43.8 ML
BH CV ECHO MEAS - EF(CUBED): 59.4 %
BH CV ECHO MEAS - EF(MOD-BP): 59 %
BH CV ECHO MEAS - EF(MOD-SP4): 59.1 %
BH CV ECHO MEAS - EF(TEICH): 52.2 %
BH CV ECHO MEAS - ESV(CUBED): 14.5 ML
BH CV ECHO MEAS - ESV(MOD-SP4): 19.2 ML
BH CV ECHO MEAS - ESV(TEICH): 20.9 ML
BH CV ECHO MEAS - FS: 26 %
BH CV ECHO MEAS - IVS/LVPW: 0.95
BH CV ECHO MEAS - IVSD: 1.1 CM
BH CV ECHO MEAS - LA DIMENSION(2D): 3.7 CM
BH CV ECHO MEAS - LV DIASTOLIC VOL/BSA (35-75): 26.2 ML/M^2
BH CV ECHO MEAS - LV MASS(C)D: 117.2 GRAMS
BH CV ECHO MEAS - LV MASS(C)DI: 65.4 GRAMS/M^2
BH CV ECHO MEAS - LV MAX PG: 6.3 MMHG
BH CV ECHO MEAS - LV MEAN PG: 3.6 MMHG
BH CV ECHO MEAS - LV SYSTOLIC VOL/BSA (12-30): 10.7 ML/M^2
BH CV ECHO MEAS - LV V1 MAX: 125.5 CM/SEC
BH CV ECHO MEAS - LV V1 MEAN: 90.6 CM/SEC
BH CV ECHO MEAS - LV V1 VTI: 18.6 CM
BH CV ECHO MEAS - LVIDD: 3.3 CM
BH CV ECHO MEAS - LVIDS: 2.4 CM
BH CV ECHO MEAS - LVOT AREA: 2.7 CM^2
BH CV ECHO MEAS - LVOT DIAM: 1.8 CM
BH CV ECHO MEAS - LVPWD: 1.2 CM
BH CV ECHO MEAS - MR MAX PG: 33.2 MMHG
BH CV ECHO MEAS - MR MAX VEL: 288 CM/SEC
BH CV ECHO MEAS - MV DEC TIME: 0.12 SEC
BH CV ECHO MEAS - MV E MAX VEL: 83.1 CM/SEC
BH CV ECHO MEAS - PA MAX PG (FULL): 1.2 MMHG
BH CV ECHO MEAS - PA MAX PG: 3.1 MMHG
BH CV ECHO MEAS - PA MEAN PG (FULL): 0.5 MMHG
BH CV ECHO MEAS - PA MEAN PG: 1.4 MMHG
BH CV ECHO MEAS - PA V2 MAX: 87.4 CM/SEC
BH CV ECHO MEAS - PA V2 MEAN: 55.7 CM/SEC
BH CV ECHO MEAS - PA V2 VTI: 11.2 CM
BH CV ECHO MEAS - PULM DIAS VEL: 51 CM/SEC
BH CV ECHO MEAS - PULM S/D: 0.88
BH CV ECHO MEAS - PULM SYS VEL: 44.9 CM/SEC
BH CV ECHO MEAS - RV MAX PG: 1.8 MMHG
BH CV ECHO MEAS - RV MEAN PG: 0.91 MMHG
BH CV ECHO MEAS - RV V1 MAX: 67.3 CM/SEC
BH CV ECHO MEAS - RV V1 MEAN: 44.5 CM/SEC
BH CV ECHO MEAS - RV V1 VTI: 8.6 CM
BH CV ECHO MEAS - RVDD: 2.6 CM
BH CV ECHO MEAS - SI(AO): 96.3 ML/M^2
BH CV ECHO MEAS - SI(CUBED): 11.8 ML/M^2
BH CV ECHO MEAS - SI(LVOT): 27.7 ML/M^2
BH CV ECHO MEAS - SI(MOD-SP4): 15.5 ML/M^2
BH CV ECHO MEAS - SI(TEICH): 12.8 ML/M^2
BH CV ECHO MEAS - SV(AO): 172.7 ML
BH CV ECHO MEAS - SV(CUBED): 21.2 ML
BH CV ECHO MEAS - SV(LVOT): 49.6 ML
BH CV ECHO MEAS - SV(MOD-SP4): 27.8 ML
BH CV ECHO MEAS - SV(TEICH): 22.9 ML
BH CV ECHO MEAS - TR MAX VEL: 244.3 CM/SEC
BUN SERPL-MCNC: 16 MG/DL (ref 8–23)
BUN/CREAT SERPL: 17.4 (ref 7–25)
CALCIUM SPEC-SCNC: 9.2 MG/DL (ref 8.6–10.5)
CHLORIDE SERPL-SCNC: 97 MMOL/L (ref 98–107)
CO2 SERPL-SCNC: 26 MMOL/L (ref 22–29)
CREAT SERPL-MCNC: 0.92 MG/DL (ref 0.57–1)
DEPRECATED RDW RBC AUTO: 48.1 FL (ref 37–54)
EOSINOPHIL # BLD AUTO: 0.1 10*3/MM3 (ref 0–0.4)
EOSINOPHIL NFR BLD AUTO: 1.1 % (ref 0.3–6.2)
ERYTHROCYTE [DISTWIDTH] IN BLOOD BY AUTOMATED COUNT: 15.8 % (ref 12.3–15.4)
GFR SERPL CREATININE-BSD FRML MDRD: 59 ML/MIN/1.73
GLUCOSE SERPL-MCNC: 106 MG/DL (ref 65–99)
HCT VFR BLD AUTO: 38.1 % (ref 34–46.6)
HGB BLD-MCNC: 12.2 G/DL (ref 12–15.9)
LV EF 2D ECHO EST: 60 %
LYMPHOCYTES # BLD AUTO: 3.6 10*3/MM3 (ref 0.7–3.1)
LYMPHOCYTES NFR BLD AUTO: 39.2 % (ref 19.6–45.3)
MCH RBC QN AUTO: 28.1 PG (ref 26.6–33)
MCHC RBC AUTO-ENTMCNC: 32 G/DL (ref 31.5–35.7)
MCV RBC AUTO: 88 FL (ref 79–97)
MONOCYTES # BLD AUTO: 1 10*3/MM3 (ref 0.1–0.9)
MONOCYTES NFR BLD AUTO: 11.5 % (ref 5–12)
NEUTROPHILS NFR BLD AUTO: 4.3 10*3/MM3 (ref 1.7–7)
NEUTROPHILS NFR BLD AUTO: 47 % (ref 42.7–76)
NRBC BLD AUTO-RTO: 0.1 /100 WBC (ref 0–0.2)
PLATELET # BLD AUTO: 341 10*3/MM3 (ref 140–450)
PMV BLD AUTO: 8.8 FL (ref 6–12)
POTASSIUM SERPL-SCNC: 3.1 MMOL/L (ref 3.5–5.2)
POTASSIUM SERPL-SCNC: 4.8 MMOL/L (ref 3.5–5.2)
QT INTERVAL: 289 MS
RBC # BLD AUTO: 4.34 10*6/MM3 (ref 3.77–5.28)
SODIUM SERPL-SCNC: 137 MMOL/L (ref 136–145)
WBC # BLD AUTO: 9.1 10*3/MM3 (ref 3.4–10.8)

## 2021-07-26 PROCEDURE — 25010000002 ONDANSETRON PER 1 MG: Performed by: HOSPITALIST

## 2021-07-26 PROCEDURE — 93306 TTE W/DOPPLER COMPLETE: CPT | Performed by: INTERNAL MEDICINE

## 2021-07-26 PROCEDURE — 85025 COMPLETE CBC W/AUTO DIFF WBC: CPT | Performed by: INTERNAL MEDICINE

## 2021-07-26 PROCEDURE — 99222 1ST HOSP IP/OBS MODERATE 55: CPT | Performed by: INTERNAL MEDICINE

## 2021-07-26 PROCEDURE — 99226 PR SBSQ OBSERVATION CARE/DAY 35 MINUTES: CPT | Performed by: HOSPITALIST

## 2021-07-26 PROCEDURE — 80048 BASIC METABOLIC PNL TOTAL CA: CPT | Performed by: INTERNAL MEDICINE

## 2021-07-26 PROCEDURE — 97116 GAIT TRAINING THERAPY: CPT

## 2021-07-26 PROCEDURE — 96366 THER/PROPH/DIAG IV INF ADDON: CPT

## 2021-07-26 PROCEDURE — 25010000002 DIGOXIN PER 500 MCG: Performed by: INTERNAL MEDICINE

## 2021-07-26 PROCEDURE — 96376 TX/PRO/DX INJ SAME DRUG ADON: CPT

## 2021-07-26 PROCEDURE — 93306 TTE W/DOPPLER COMPLETE: CPT

## 2021-07-26 PROCEDURE — 84132 ASSAY OF SERUM POTASSIUM: CPT | Performed by: HOSPITALIST

## 2021-07-26 PROCEDURE — 99213 OFFICE O/P EST LOW 20 MIN: CPT | Performed by: NURSE PRACTITIONER

## 2021-07-26 PROCEDURE — 99223 1ST HOSP IP/OBS HIGH 75: CPT | Performed by: NURSE PRACTITIONER

## 2021-07-26 PROCEDURE — 97162 PT EVAL MOD COMPLEX 30 MIN: CPT

## 2021-07-26 RX ORDER — METOCLOPRAMIDE 10 MG/1
10 TABLET ORAL
Status: DISCONTINUED | OUTPATIENT
Start: 2021-07-26 | End: 2021-07-27 | Stop reason: HOSPADM

## 2021-07-26 RX ORDER — LIDOCAINE 50 MG/G
1 PATCH TOPICAL DAILY PRN
Status: DISCONTINUED | OUTPATIENT
Start: 2021-07-26 | End: 2021-07-27 | Stop reason: HOSPADM

## 2021-07-26 RX ORDER — ONDANSETRON 2 MG/ML
4 INJECTION INTRAMUSCULAR; INTRAVENOUS EVERY 6 HOURS PRN
Status: DISCONTINUED | OUTPATIENT
Start: 2021-07-26 | End: 2021-07-27 | Stop reason: HOSPADM

## 2021-07-26 RX ORDER — PANTOPRAZOLE SODIUM 40 MG/1
40 TABLET, DELAYED RELEASE ORAL
Status: DISCONTINUED | OUTPATIENT
Start: 2021-07-26 | End: 2021-07-27 | Stop reason: HOSPADM

## 2021-07-26 RX ADMIN — SILDENAFIL 20 MG: 20 TABLET, FILM COATED ORAL at 23:01

## 2021-07-26 RX ADMIN — ROPINIROLE HYDROCHLORIDE 0.25 MG: 0.25 TABLET, FILM COATED ORAL at 22:59

## 2021-07-26 RX ADMIN — HYDROCODONE BITARTRATE AND ACETAMINOPHEN 1 TABLET: 7.5; 325 TABLET ORAL at 18:07

## 2021-07-26 RX ADMIN — METOCLOPRAMIDE 10 MG: 10 TABLET ORAL at 17:14

## 2021-07-26 RX ADMIN — LIDOCAINE 1 PATCH: 50 PATCH TOPICAL at 08:26

## 2021-07-26 RX ADMIN — GABAPENTIN 600 MG: 600 TABLET, FILM COATED ORAL at 08:37

## 2021-07-26 RX ADMIN — POTASSIUM CHLORIDE 40 MEQ: 1.5 POWDER, FOR SOLUTION ORAL at 13:03

## 2021-07-26 RX ADMIN — POTASSIUM CHLORIDE 40 MEQ: 1.5 POWDER, FOR SOLUTION ORAL at 17:14

## 2021-07-26 RX ADMIN — FUROSEMIDE 40 MG: 40 TABLET ORAL at 08:26

## 2021-07-26 RX ADMIN — PANTOPRAZOLE SODIUM 40 MG: 40 TABLET, DELAYED RELEASE ORAL at 17:14

## 2021-07-26 RX ADMIN — PANTOPRAZOLE SODIUM 40 MG: 40 INJECTION, POWDER, FOR SOLUTION INTRAVENOUS at 05:51

## 2021-07-26 RX ADMIN — TRAZODONE HYDROCHLORIDE 25 MG: 50 TABLET ORAL at 23:03

## 2021-07-26 RX ADMIN — HYDROCODONE BITARTRATE AND ACETAMINOPHEN 1 TABLET: 7.5; 325 TABLET ORAL at 23:02

## 2021-07-26 RX ADMIN — METOCLOPRAMIDE 10 MG: 10 TABLET ORAL at 10:23

## 2021-07-26 RX ADMIN — METOPROLOL SUCCINATE 50 MG: 50 TABLET, EXTENDED RELEASE ORAL at 08:26

## 2021-07-26 RX ADMIN — POTASSIUM CHLORIDE 40 MEQ: 1.5 POWDER, FOR SOLUTION ORAL at 08:37

## 2021-07-26 RX ADMIN — Medication 10 ML: at 23:03

## 2021-07-26 RX ADMIN — HYDROCODONE BITARTRATE AND ACETAMINOPHEN 1 TABLET: 7.5; 325 TABLET ORAL at 10:22

## 2021-07-26 RX ADMIN — GABAPENTIN 600 MG: 600 TABLET, FILM COATED ORAL at 23:02

## 2021-07-26 RX ADMIN — FUROSEMIDE 40 MG: 40 TABLET ORAL at 23:02

## 2021-07-26 RX ADMIN — SILDENAFIL 20 MG: 20 TABLET, FILM COATED ORAL at 08:26

## 2021-07-26 RX ADMIN — HYDROCODONE BITARTRATE AND ACETAMINOPHEN 1 TABLET: 7.5; 325 TABLET ORAL at 22:56

## 2021-07-26 RX ADMIN — DIGOXIN 250 MCG: 250 INJECTION, SOLUTION INTRAMUSCULAR; INTRAVENOUS at 05:50

## 2021-07-26 RX ADMIN — SERTRALINE HYDROCHLORIDE 50 MG: 50 TABLET ORAL at 23:03

## 2021-07-26 RX ADMIN — APIXABAN 5 MG: 5 TABLET, FILM COATED ORAL at 08:38

## 2021-07-26 RX ADMIN — ONDANSETRON 4 MG: 2 INJECTION INTRAMUSCULAR; INTRAVENOUS at 08:21

## 2021-07-26 RX ADMIN — APIXABAN 5 MG: 5 TABLET, FILM COATED ORAL at 23:01

## 2021-07-26 RX ADMIN — LEVOTHYROXINE SODIUM 75 MCG: 0.07 TABLET ORAL at 08:26

## 2021-07-26 RX ADMIN — GABAPENTIN 600 MG: 600 TABLET, FILM COATED ORAL at 01:20

## 2021-07-26 NOTE — CASE MANAGEMENT/SOCIAL WORK
Continued Stay Note   [facility]     Patient Name: Catalina Moreno  MRN: [id number]  Today's Date: 7/26/2021    Admit Date: 7/25/2021    Discharge Plan     Row Name 07/26/21 1456       Plan    Plan  Referral to [facility], pending acceptance. See [facility] for PASRR. Will require precert.    Plan Comments  PT recommends IP rehab, referral to 1st choice [facility], pending acceptance. Informed Nitin MENDES to complete PASRR.     Phone communication or documentation only - no physical contact with patient or family.      Shelly Cash RN

## 2021-07-26 NOTE — CASE MANAGEMENT/SOCIAL WORK
Discharge Planning Assessment   [facility]     Patient Name: Catalina Moreno  MRN: [id number]  Today's Date: 7/26/2021    Admit Date: 7/25/2021    Discharge Needs Assessment     Row Name 07/26/21 1347       Living Environment    Lives With  alone    Current Living Arrangements  home/apartment/condo    Primary Care Provided by  self    Provides Primary Care For  no one, unable/limited ability to care for self    Family Caregiver if Needed  child(evangelista), adult    Quality of Family Relationships  helpful    Able to Return to Prior Arrangements  yes       Resource/Environmental Concerns    Resource/Environmental Concerns  none    Transportation Concerns  car, none       Transition Planning    Patient/Family Anticipates Transition to  inpatient rehabilitation facility    Patient/Family Anticipated Services at Transition  ;rehabilitation services    Transportation Anticipated  family or friend will provide       Discharge Needs Assessment    Readmission Within the Last 30 Days  no previous admission in last 30 days    Equipment Currently Used at Home  walker, rolling    Concerns to be Addressed  discharge planning    Anticipated Changes Related to Illness  inability to care for self    Equipment Needed After Discharge  none    Provided Post Acute Provider List?  Yes    Post Acute Provider List  Home Health;Inpatient Rehab    Delivered To  Patient    Method of Delivery  In person    Current Discharge Risk  lives alone        Discharge Plan     Row Name 07/26/21 1354       Plan    Plan  PT/OT eval ordered. From home alone. If IP rehab needed [facility]. If HH needed [facility]st [facility].    Patient/Family in Agreement with Plan  yes    Plan Comments  Met with patient at bedside, reports she lives at home alone. Does not drive, son provides transportation. PCP and pharmacy confirmed. No issues affording food or medications. Discussed IP rehab vs HH. If IP rehab needed 1[facility]. If HH needed [facility]. PT/OT eval ordered. Will follow. DC barriers: IV Cardizem gtt, cardiology and GI consult          Expected Discharge Date and Time     Expected Discharge Date Expected Discharge Time    Jul 28, 2021         Demographic Summary     Row Name 07/26/21 1343       General Information    Admission Type  inpatient    Arrived From  emergency department    Required Notices Provided  Important Message from Medicare    Referral Source  admission list    Reason for Consult  discharge planning    Preferred Language  English        Functional Status     Row Name 07/26/21 1343       Functional Status    Usual Activity Tolerance  moderate    Current Activity Tolerance  fair       Functional Status, IADL    Medications  independent    Meal Preparation  independent    Housekeeping  independent    Laundry  independent    Shopping  independent        Patient Forms     Row Name 07/26/21 1345       Patient Forms    Important Message from Medicare (IMM)  Delivered IM delivered 7/25        Met with patient in room wearing PPE: mask     Maintained distance greater than six feet and spent less than 15 minutes in the room.            Shelly Cash RN

## 2021-07-26 NOTE — CONSULTS
Referring Provider:   Reason for Consultation: Atrial fibrillation with rapid ventricular rate    Patient Care Team:  Anayeli Costa APRN as PCP - General (Family Medicine)  Barrett Evans MD as Consulting Physician (Cardiology)    Chief complaint failure to thrive        History of present illness:  Catalina Moreno is a 81 y.o. female who presents with unable to eat and drink for 1 week with abdominal pain.   81-year-old white female patient with a known history of paroxysmal H fibrillation admitted to hospital with 1 week history of not doing well unable to eat or drink and also having problems with abdominal pain  Patient also found to be in atrial fibrillation with rapid ventricular rate  According to the patient she was having significant dizzy spells feel like almost passing out  Denies of any chest pain or shortness of breath    Review of Systems   Constitutional: Positive for malaise/fatigue. Negative for fever.   HENT: Negative for congestion and hearing loss.    Eyes: Negative for double vision and visual disturbance.   Cardiovascular: Negative for chest pain, claudication, dyspnea on exertion, leg swelling and syncope.   Respiratory: Negative for cough and shortness of breath.    Endocrine: Negative for cold intolerance.   Skin: Negative for color change and rash.   Musculoskeletal: Negative for arthritis and joint pain.   Gastrointestinal: Positive for abdominal pain, diarrhea and nausea. Negative for heartburn.   Genitourinary: Negative for hematuria.   Neurological: Negative for excessive daytime sleepiness and dizziness.   Psychiatric/Behavioral: Negative for depression. The patient is not nervous/anxious.    All other systems reviewed and are negative.      History  Past Medical History:   Diagnosis Date   • Anxiety    • Arthritis    • Atrial fibrillation (CMS/HCC)     paroxysmal   • Broken shoulder     left-- due to fall 11-7-19 was at [facility]   • Buttock pain     rt side    • DDD (degenerative disc disease), lumbar    • Depression    • Edema     9/2020 foot   • GERD (gastroesophageal reflux disease)    • H/O fall    • Hip pain     rt   • Hypertension    • Hypothyroidism    • Insomnia    • Leg pain     rt   • Low back pain    • Neuropathy    • PONV (postoperative nausea and vomiting)    • Pulmonary hypertension (CMS/HCC)    • Radiculopathy    • Restless legs    • Spondylolisthesis    • Urinary incontinence        Past Surgical History:   Procedure Laterality Date   • BACK SURGERY  07/19/2018    PDC &  PSF L3-L5 insitu   • CARDIAC CATHETERIZATION N/A 4/2/2021    Procedure: Cardiac Catheterization/Vascular Study;  Surgeon: Barrett Evans MD;  Location: [facility] INVASIVE LOCATION;  Service: Cardiovascular;  Laterality: N/A;   • CHOLECYSTECTOMY     • COLONOSCOPY     • HYSTERECTOMY     • ROTATOR CUFF REPAIR Left        Family History   Problem Relation Age of Onset   • Cancer Father    • Diabetes Son    • Cancer Paternal Aunt    • Heart disease Paternal Aunt    • Cancer Paternal Uncle        Social History     Tobacco Use   • Smoking status: Never Smoker   • Smokeless tobacco: Never Used   Vaping Use   • Vaping Use: Never used   Substance Use Topics   • Alcohol use: No   • Drug use: No        Medications Prior to Admission   Medication Sig Dispense Refill Last Dose   • apixaban (ELIQUIS) 5 MG tablet tablet Take 1 tablet by mouth Every 12 (Twelve) Hours. 180 tablet 3 7/24/2021 at Unknown time   • famotidine (PEPCID) 20 MG tablet Take 20 mg by mouth Daily.  0 7/24/2021 at Unknown time   • furosemide (LASIX) 40 MG tablet Take 40 mg by mouth 2 (Two) Times a Day. Uses for leg swelling   7/24/2021 at Unknown time   • HYDROcodone-acetaminophen (NORCO) 7.5-325 MG per tablet Take 1 tablet by mouth 4 (Four) Times a Day As Needed for Severe Pain . DNF before 6/29/2021 120 tablet 0    • levothyroxine (SYNTHROID, LEVOTHROID) 75 MCG tablet Take 75 mcg by mouth Daily. Take preop  0 Past  Week at Unknown time   • lidocaine (LIDODERM) 5 % Place 1 patch on the skin as directed by provider Daily As Needed for Mild Pain . Remove & Discard patch within 12 hours or as directed by MD      • metoprolol succinate XL (TOPROL-XL) 50 MG 24 hr tablet TAKE 1 TABLET BY MOUTH ONCE DAILY 90 tablet 3 7/24/2021 at Unknown time   • pantoprazole (PROTONIX) 40 MG EC tablet Take 40 mg by mouth Daily With Lunch. Afternoons  0 7/24/2021 at Unknown time   • rOPINIRole (REQUIP) 0.25 MG tablet Take 1 tablet by mouth Every Night. Take 1 hour before bedtime 30 tablet 5 7/24/2021 at Unknown time   • sildenafil (REVATIO) 20 MG tablet Take 20 mg by mouth 2 (Two) Times a Day.   7/24/2021 at Unknown time   • traZODone (DESYREL) 50 MG tablet Take 25 mg by mouth Every Night.   7/24/2021 at Unknown time   • sertraline (ZOLOFT) 50 MG tablet Take 50 mg by mouth Every Night.            Baclofen, Codeine, Ibuprofen, Methocarbamol, Naproxen, Tizanidine hcl, and Tolmetin    Scheduled Meds:apixaban, 5 mg, Oral, Q12H  furosemide, 40 mg, Oral, BID  gabapentin, 600 mg, Oral, BID  levothyroxine, 75 mcg, Oral, Daily  lidocaine, 1 patch, Transdermal, Q24H  metoclopramide, 10 mg, Oral, TID AC  metoprolol succinate XL, 50 mg, Oral, Daily  pantoprazole, 40 mg, Oral, BID AC  rOPINIRole, 0.25 mg, Oral, Nightly  sertraline, 50 mg, Oral, Nightly  sildenafil, 20 mg, Oral, BID  sodium chloride, 10 mL, Intravenous, Q12H  traZODone, 25 mg, Oral, Nightly      Continuous Infusions:dilTIAZem, 5-15 mg/hr, Last Rate: Stopped (07/26/21 0830)      PRN Meds:.•  acetaminophen **OR** acetaminophen **OR** acetaminophen  •  HYDROcodone-acetaminophen  •  lidocaine  •  ondansetron  •  potassium chloride **OR** potassium chloride **OR** potassium chloride  •  [COMPLETED] Insert peripheral IV **AND** sodium chloride  •  sodium chloride        VITAL SIGNS  Vitals:    07/26/21 1000 07/26/21 1035 07/26/21 1423 07/26/21 1544   BP: 116/99 141/48 108/65    BP Location:       Patient  "Position:       Pulse: 81 80 83    Resp:  16 20    Temp:  97.9 °F (36.6 °C) 98.5 °F (36.9 °C)    TempSrc:       SpO2: 90% 90% 91%    Weight:    78 kg (172 lb)   Height:    160 cm (63\")       Flowsheet Rows      First Filed Value   Admission Height  154.9 cm (61\") Documented at 07/25/2021 1245   Admission Weight  72.6 kg (160 lb) Documented at 07/25/2021 1245           TELEMETRY: Atrial fibrillation    Physical Exam:  Constitutional:       Appearance: Well-developed.   Eyes:      General: No scleral icterus.     Conjunctiva/sclera: Conjunctivae normal.   HENT:      Head: Normocephalic and atraumatic.    Mouth/Throat:      Mouth: No oral lesions.      Pharynx: Uvula midline.   Neck:      Thyroid: No thyromegaly.      Vascular: No carotid bruit or JVD.      Trachea: Trachea normal.   Pulmonary:      Effort: Pulmonary effort is normal.      Breath sounds: Normal breath sounds.   Cardiovascular:      Normal rate. Irregularly irregular rhythm.      No gallop.   Pulses:     Intact distal pulses.   Abdominal:      General: Bowel sounds are normal.      Palpations: Abdomen is soft.      Tenderness: There is generalized abdominal tenderness.   Musculoskeletal: Normal range of motion.      Cervical back: Neck supple. Skin:     General: Skin is warm. There is no cyanosis.   Neurological:      Mental Status: Alert and oriented to person, place, and time.      Comments: No focal deficits   Psychiatric:         Behavior: Behavior is cooperative.                  LAB RESULTS (LAST 7 DAYS)    CBC  Results from last 7 days   Lab Units 07/26/21  0300 07/25/21  1323   WBC 10*3/mm3 9.10 8.70   RBC 10*6/mm3 4.34 4.84   HEMOGLOBIN g/dL 12.2 14.0   HEMATOCRIT % 38.1 41.5   MCV fL 88.0 85.7   PLATELETS 10*3/mm3 341 365       BMP  Results from last 7 days   Lab Units 07/26/21  0300 07/25/21  1432   SODIUM mmol/L 137 141   POTASSIUM mmol/L 3.1* 3.0*   CHLORIDE mmol/L 97* 99   CO2 mmol/L 26.0 26.0   BUN mg/dL 16 17   CREATININE mg/dL 0.92 0.94 "   GLUCOSE mg/dL 106* 120*   MAGNESIUM mg/dL  --  1.9       CMP   Results from last 7 days   Lab Units 07/26/21  0300 07/25/21  1432   SODIUM mmol/L 137 141   POTASSIUM mmol/L 3.1* 3.0*   CHLORIDE mmol/L 97* 99   CO2 mmol/L 26.0 26.0   BUN mg/dL 16 17   CREATININE mg/dL 0.92 0.94   GLUCOSE mg/dL 106* 120*   ALBUMIN g/dL  --  4.40   BILIRUBIN mg/dL  --  0.9   ALK PHOS U/L  --  103   AST (SGOT) U/L  --  22   ALT (SGPT) U/L  --  13   LIPASE U/L  --  12*         BNP        TROPONIN  Results from last 7 days   Lab Units 07/25/21  1432   TROPONIN T ng/mL <0.010       CoAg  Results from last 7 days   Lab Units 07/25/21  1323   INR  1.08   APTT seconds 29.4       Creatinine Clearance  Estimated Creatinine Clearance: 48.2 mL/min (by C-G formula based on SCr of 0.92 mg/dL).    ABG        Radiology  CT Abdomen Pelvis Without Contrast    Result Date: 7/25/2021  1. No acute abnormality is identified. 2. The appendix is not delineated. No secondary signs of appendicitis. 3. Additional findings as reported above.  Electronically Signed By-Maia Finney MD On:7/25/2021 4:46 PM This report was finalized on [id number] by  Maia Finney MD.    XR Chest 1 View    Result Date: 7/25/2021  1.  No evidence of active disease.   Electronically Signed By-Maia Finney MD On:7/25/2021 1:58 PM This report was finalized on [id number] by  Maia Finney MD.          EKG          I personally viewed and interpreted the patient's EKG/Telemetry data:    ECHOCARDIOGRAM:    Results for orders placed in visit on 06/24/20    SCANNED - ECHOCARDIOGRAM      STRESS MYOVIEW:    CARDIAC CATHETERIZATION:    OTHER:         Assessment/Plan       Atrial fibrillation with rapid ventricular response (CMS/HCC)    Essential hypertension    Hypothyroidism    Acute on chronic diastolic congestive heart failure (CMS/HCC)    Pulmonary hypertension (CMS/HCC)      Atrial fibrillation with rapid ventricular rate  In the setting of failure to thrive  Patient is seen by  GI regarding abdominal complaints  Cardiac wise needs aggressive heart rate control anticoagulation  We will check the echocardiogram  History of pulmonary hypertension needs aggressive control of sleep apnea and also pulmonary evaluation    I discussed the patients findings and my recommendations with patient     Barrett Evans MD  07/26/21  17:36 EDT

## 2021-07-26 NOTE — DISCHARGE PLACEMENT REQUEST
"Catalina Carrera (81 y.o. Female)     Date of Birth Social Security Number Address Home Phone MRN    1940  [address] [address] [phone] [id number]    Rastafarian Marital Status          None        Admission Date Admission Type Admitting Provider Attending Provider Department, Room/Bed    7/25/21 Emergency Alexa Rodriguez MD Prakash, [place], MD [facility], 2122/1    Discharge Date Discharge Disposition Discharge Destination                       Attending Provider: Alexa Rodriguez MD    Allergies: Baclofen, Codeine, Ibuprofen, Methocarbamol, Naproxen, Tizanidine Hcl, Tolmetin    Isolation: None   Infection: None   Code Status: CPR    Ht: 160 cm (63\")   Wt: 78.3 kg (172 lb 9.9 oz)    Admission Cmt: None   Principal Problem: Atrial fibrillation with rapid ventricular response (CMS/HCC) [I48.91]                 Active Insurance as of 7/25/2021     Primary Coverage     Payor Plan Insurance Group Employer/Plan Group    HUMANA MEDICARE REPLACEMENT HUMANA MEDICARE REPLACEMENT [id number]     Payor Plan Address Payor Plan Phone Number Payor Plan Fax Number Effective Dates    [address] [address] [phone]  1/1/2019 - None Entered    [facility] [id number]       Subscriber Name Subscriber Birth Date Member ID       CATALINA CARRERA 1940 [id number]                 Emergency Contacts      (Rel.) Home Phone Work Phone Mobile Phone    MADHAV DEAN (Son) [phone] -- [phone]              "

## 2021-07-26 NOTE — PLAN OF CARE
Problem: Adult Inpatient Plan of Care  Goal: Plan of Care Review  Outcome: Ongoing, Progressing  Goal: Patient-Specific Goal (Individualized)  Outcome: Ongoing, Progressing  Goal: Absence of Hospital-Acquired Illness or Injury  Outcome: Ongoing, Progressing  Intervention: Identify and Manage Fall Risk  Recent Flowsheet Documentation  Taken 7/26/2021 0200 by Suzie Boyle RN  Safety Promotion/Fall Prevention: safety round/check completed  Taken 7/26/2021 0040 by Suzie Boyle RN  Safety Promotion/Fall Prevention: safety round/check completed  Taken 7/25/2021 2200 by Suzie Boyle RN  Safety Promotion/Fall Prevention: safety round/check completed  Taken 7/25/2021 2143 by Suzie Boyle RN  Safety Promotion/Fall Prevention: safety round/check completed  Intervention: Prevent Skin Injury  Recent Flowsheet Documentation  Taken 7/26/2021 0040 by Suzie Boyle RN  Body Position: position changed independently  Taken 7/25/2021 2143 by Suzie Boyle RN  Body Position: position changed independently  Intervention: Prevent and Manage VTE (venous thromboembolism) Risk  Recent Flowsheet Documentation  Taken 7/25/2021 2143 by Suzie Boyle RN  VTE Prevention/Management: dorsiflexion/plantar flexion performed  Goal: Optimal Comfort and Wellbeing  Outcome: Ongoing, Progressing  Goal: Readiness for Transition of Care  Outcome: Ongoing, Progressing   Goal Outcome Evaluation:

## 2021-07-26 NOTE — CASE MANAGEMENT/SOCIAL WORK
Social Work Assessment  [facility]     Patient Name: Catalina Moreno  MRN: [id number]  Today's Date: 7/26/2021    Admit Date: 7/25/2021    Discharge Plan     Row Name 07/26/21 1624       Plan    Plan  [facility] PASRR approved. Will require precert.    Plan Comments  SW completed PASRR, which is approved.     No physical contact with patient on this date.  KATY Jensen    Phone: [phone]  Cell: [phone]  Fax: [phone]  [email or web address]

## 2021-07-26 NOTE — PLAN OF CARE
Goal Outcome Evaluation:  Patient has complained of nausea and diarrhea today. Pt has had 2 episodes of diarrhea. GI following. Pt weaned off cardizem gtt this am. Cardiology following. Will continue to monitor.     Problem: Adult Inpatient Plan of Care  Goal: Plan of Care Review  Outcome: Ongoing, Progressing  Goal: Patient-Specific Goal (Individualized)  Outcome: Ongoing, Progressing  Goal: Absence of Hospital-Acquired Illness or Injury  Outcome: Ongoing, Progressing  Intervention: Identify and Manage Fall Risk  Recent Flowsheet Documentation  Taken 7/26/2021 1200 by Rosio Anna RN  Safety Promotion/Fall Prevention:   activity supervised   assistive device/personal items within reach   clutter free environment maintained   nonskid shoes/slippers when out of bed   safety round/check completed  Taken 7/26/2021 1000 by Rosio Anna RN  Safety Promotion/Fall Prevention:   activity supervised   assistive device/personal items within reach   clutter free environment maintained   nonskid shoes/slippers when out of bed   safety round/check completed  Taken 7/26/2021 0800 by Rosio Anna RN  Safety Promotion/Fall Prevention:   activity supervised   assistive device/personal items within reach   clutter free environment maintained   nonskid shoes/slippers when out of bed   safety round/check completed  Intervention: Prevent Infection  Recent Flowsheet Documentation  Taken 7/26/2021 1200 by Rosio Anna RN  Infection Prevention:   hand hygiene promoted   personal protective equipment utilized  Taken 7/26/2021 1000 by Rosio Anna RN  Infection Prevention:   hand hygiene promoted   personal protective equipment utilized  Taken 7/26/2021 0800 by Rosio Anna RN  Infection Prevention:   hand hygiene promoted   personal protective equipment utilized  Goal: Optimal Comfort and Wellbeing  Outcome: Ongoing, Progressing  Goal: Readiness for Transition of Care  Outcome: Ongoing,  Progressing

## 2021-07-26 NOTE — THERAPY EVALUATION
Patient Name: Catalina Moreno  : 1940    MRN: [id number]                              Today's Date: 2021       Admit Date: 2021    Visit Dx:     ICD-10-CM ICD-9-CM   1. Atrial fibrillation with rapid ventricular response (CMS/HCC)  I48.91 427.31   2. Abdominal pain, unspecified abdominal location  R10.9 789.00   3. Intractable vomiting, presence of nausea not specified, unspecified vomiting type  R11.10 536.2     Patient Active Problem List   Diagnosis   • Arthritis   • Neuropathic pain   • Other long term (current) drug therapy   • Polyarthralgia   • Sacroiliitis (CMS/HCC)   • Depressive disorder   • Primary fibromyalgia syndrome   • Gastroesophageal reflux disease   • Essential hypertension   • Lightheadedness   • Hypothyroidism   • Lumbar radiculopathy   • Altered mental status   • Acute on chronic diastolic congestive heart failure (CMS/HCC)   • Nonrheumatic tricuspid valve regurgitation   • Atrial fibrillation (CMS/HCC)   • Pulmonary hypertension (CMS/HCC)   • Acquired spondylolisthesis of lumbosacral region   • Spondylolisthesis, lumbar region   • Vitamin D deficiency   • Stage 2 chronic kidney disease   • DDD (degenerative disc disease), cervical   • Chronic pain   • Chronic low back pain   • Cervical stenosis of spinal canal   • Anemia   • Polypharmacy   • Atrial fibrillation with rapid ventricular response (CMS/HCC)     Past Medical History:   Diagnosis Date   • Anxiety    • Arthritis    • Atrial fibrillation (CMS/HCC)     paroxysmal   • Broken shoulder     left-- due to fall 19 was at [facility]   • Buttock pain     rt side   • DDD (degenerative disc disease), lumbar    • Depression    • Edema     2020 foot   • GERD (gastroesophageal reflux disease)    • H/O fall    • Hip pain     rt   • Hypertension    • Hypothyroidism    • Insomnia    • Leg pain     rt   • Low back pain    • Neuropathy    • PONV (postoperative nausea and vomiting)    • Pulmonary hypertension (CMS/HCC)    •  Radiculopathy    • Restless legs    • Spondylolisthesis    • Urinary incontinence      Past Surgical History:   Procedure Laterality Date   • BACK SURGERY  07/19/2018    PDC &  PSF L3-L5 insitu   • CARDIAC CATHETERIZATION N/A 4/2/2021    Procedure: Cardiac Catheterization/Vascular Study;  Surgeon: Barrett Evans MD;  Location: [facility] CATH INVASIVE LOCATION;  Service: Cardiovascular;  Laterality: N/A;   • CHOLECYSTECTOMY     • COLONOSCOPY     • HYSTERECTOMY     • ROTATOR CUFF REPAIR Left      General Information     Row Name 07/26/21 1349          Physical Therapy Time and Intention    Document Type  evaluation  -CM     Mode of Treatment  physical therapy  -CM     Row Name 07/26/21 1349          General Information    Patient Profile Reviewed  yes  -CM     Prior Level of Function  independent:;all household mobility;gait reports she has a large house (one level) and ambulates around home w/o difficulty using rollator wx normally  -CM     Existing Precautions/Restrictions  fall  -CM     Barriers to Rehab  medically complex  -CM     Row Name 07/26/21 1349          Living Environment    Lives With  alone  -CM     Row Name 07/26/21 1349          Home Main Entrance    Number of Stairs, Main Entrance  none  -CM     Row Name 07/26/21 1349          Stairs Within Home, Primary    Number of Stairs, Within Home, Primary  none  -CM     Row Name 07/26/21 1349          Cognition    Orientation Status (Cognition)  oriented x 3;disoriented to;person thought Trump was POTUS  -CM     Row Name 07/26/21 1349          Safety Issues, Functional Mobility    Safety Issues Affecting Function (Mobility)  awareness of need for assistance;judgment;problem-solving;impulsivity;safety precautions follow-through/compliance;safety precaution awareness  -CM     Impairments Affecting Function (Mobility)  balance;cognition;endurance/activity tolerance;shortness of breath;strength  -CM       User Key  (r) = Recorded By, (t) = Taken By, (c)  "= Cosigned By    Initials Name Provider Type    Ora Tony, PT Physical Therapist        Mobility     Row Name 07/26/21 1351          Bed Mobility    Bed Mobility  supine-sit  -CM     Supine-Sit [place] (Bed Mobility)  contact guard;verbal cues;nonverbal cues (demo/gesture)  -CM     Row Name 07/26/21 1351          Sit-Stand Transfer    Sit-Stand [place] (Transfers)  contact guard;verbal cues;nonverbal cues (demo/gesture)  -CM     Assistive Device (Sit-Stand Transfers)  walker, front-wheeled  -CM     Row Name 07/26/21 1351          Gait/Stairs (Locomotion)    [place] Level (Gait)  minimum assist (75% patient effort)  -CM     Assistive Device (Gait)  walker, front-wheeled  -CM     Distance in Feet (Gait)  30 ft in room; pt c/o she feels she is going to \"collapse\" if she ambulates any farther.  -CM     Deviations/Abnormal Patterns (Gait)  stride length decreased;gait speed decreased;festinating/shuffling;bilateral deviations  -CM     Bilateral Gait Deviations  forward flexed posture  -CM       User Key  (r) = Recorded By, (t) = Taken By, (c) = Cosigned By    Initials Name Provider Type    Ora Tony, PT Physical Therapist        Obj/Interventions     Row Name 07/26/21 1352          Range of Motion Comprehensive    General Range of Motion  bilateral upper extremity ROM WFL;bilateral lower extremity ROM WFL  -CM     Row Name 07/26/21 1352          Strength Comprehensive (MMT)    General Manual Muscle Testing (MMT) Assessment  lower extremity strength deficits identified;upper extremity strength deficits identified  -CM     Comment, General Manual Muscle Testing (MMT) Assessment  BUEs 4-/5; BLEs 3/5  -CM     Row Name 07/26/21 1352          Balance    Balance Assessment  sitting static balance;sitting dynamic balance;standing static balance;standing dynamic balance  -CM     Static Sitting Balance  WFL;unsupported;sitting, edge of bed  -CM     Dynamic Sitting Balance  " WFL;unsupported;sitting, edge of bed  -CM     Static Standing Balance  WFL;supported;standing  -CM     Dynamic Standing Balance  mild impairment;supported;standing  -CM     Row Name 07/26/21 3642          Sensory Assessment (Somatosensory)    Sensory Assessment (Somatosensory)  other (see comments) sensation present but diminished in BLEs  -CM       User Key  (r) = Recorded By, (t) = Taken By, (c) = Cosigned By    Initials Name Provider Type    Ora Tony, PT Physical Therapist        Goals/Plan     Row Name 07/26/21 3093          Transfer Goal 1 (PT)    Activity/Assistive Device (Transfer Goal 1, PT)  transfers, all;walker, rolling  -CM     [place] Level/Cues Needed (Transfer Goal 1, PT)  modified independence  -CM     Time Frame (Transfer Goal 1, PT)  1 week  -CM     Row Name 07/26/21 7319          Gait Training Goal 1 (PT)    Activity/Assistive Device (Gait Training Goal 1, PT)  gait (walking locomotion);walker, rolling  -CM     [place] Level (Gait Training Goal 1, PT)  modified independence  -CM     Distance (Gait Training Goal 1, PT)  150 ft  -CM     Time Frame (Gait Training Goal 1, PT)  2 weeks  -CM       User Key  (r) = Recorded By, (t) = Taken By, (c) = Cosigned By    Initials Name Provider Type    Ora Tony, PT Physical Therapist        Clinical Impression     Row Name 07/26/21 4583          Pain    Additional Documentation  Pain Scale: Numbers Pre/Post-Treatment (Group)  -CM     Row Name 07/26/21 9394          Pain Scale: Numbers Pre/Post-Treatment    Pretreatment Pain Rating  0/10 - no pain  -CM     Posttreatment Pain Rating  0/10 - no pain  -CM     Pain Intervention(s)  Ambulation/increased activity;Repositioned  -CM     Row Name 07/26/21 5524          Plan of Care Review    Plan of Care Reviewed With  patient  -CM     Outcome Summary  82 yo female adm for a fib w/ rvr, ae chf. Pt reports she had been feeling ill and weak, and she had pretty much not gotten out of bed for  "one week except to go to the bathroom prior to admit. During one of these walks to bathroom, she fell into her tub when she was too weak to continue ambulating. Pt normally ambulates around a large house w/ a rollator wx independently. She is not normally on any O2. Pt has also been reporting epigastric pain, which GI states is due to chronic narcotic use. Pt is able to come to sit at eob today w/o difficulty. However, she needs cga to come to stand and min assist to amb 30 ft. Pt reported feeling like she might \"collapse\" after only 15 ft, but she was safely able to amb back to her chair (additional 15 ft). Pt lives alone and is high risk for injurious falls at home, especially as she has already suffered one fall. Will need IP rehab at d/c. Pt very agreeable to this. Will follow.  -     Row Name 07/26/21 1355          Therapy Assessment/Plan (PT)    Patient/Family Therapy Goals Statement (PT)  agreeable to IP rehab  -     Rehab Potential (PT)  good, to achieve stated therapy goals  -     Criteria for Skilled Interventions Met (PT)  yes;meets criteria;skilled treatment is necessary  -     Row Name 07/26/21 1358          Vital Signs    Recovery Time  VSS w/ activity  -CM     Row Name 07/26/21 1353          Positioning and Restraints    Pre-Treatment Position  in bed  -CM     Post Treatment Position  chair  -CM     In Chair  notified nsg;sitting;call light within reach;encouraged to call for assist;exit alarm on instructed pt to sit up most of the day  -       User Key  (r) = Recorded By, (t) = Taken By, (c) = Cosigned By    Initials Name Provider Type    Ora Tony, PT Physical Therapist        Outcome Measures     Row Name 07/26/21 1400          How much help from another person do you currently need...    Turning from your back to your side while in flat bed without using bedrails?  4  -CM     Moving from lying on back to sitting on the side of a flat bed without bedrails?  4  -CM     Moving to " "and from a bed to a chair (including a wheelchair)?  3  -CM     Standing up from a chair using your arms (e.g., wheelchair, bedside chair)?  3  -CM     Climbing 3-5 steps with a railing?  2  -CM     To walk in hospital room?  2  -CM     AM-PAC 6 Clicks Score (PT)  18  -CM     Row Name 07/26/21 1400          Functional Assessment    Outcome Measure Options  AM-PAC 6 Clicks Basic Mobility (PT)  -CM       User Key  (r) = Recorded By, (t) = Taken By, (c) = Cosigned By    Initials Name Provider Type    Ora Tony, PT Physical Therapist                       Physical Therapy Education                 Title: PT OT SLP Therapies (Done)     Topic: Physical Therapy (Done)     Point: Mobility training (Done)     Learning Progress Summary           Patient Acceptance, E,TB, VU by DONELL at 7/26/2021 1400                               User Key     Initials Effective Dates Name Provider Type Discipline     06/16/21 -  Ora Calderón, PT Physical Therapist PT              PT Recommendation and Plan  Planned Therapy Interventions (PT): balance training, gait training, patient/family education, strengthening, postural re-education, ROM (range of motion), transfer training  Plan of Care Reviewed With: patient  Outcome Summary: 80 yo female adm for a fib w/ rvr, ae chf. Pt reports she had been feeling ill and weak, and she had pretty much not gotten out of bed for one week except to go to the bathroom prior to admit. During one of these walks to bathroom, she fell into her tub when she was too weak to continue ambulating. Pt normally ambulates around a large house w/ a rollator wx independently. She is not normally on any O2. Pt has also been reporting epigastric pain, which GI states is due to chronic narcotic use. Pt is able to come to sit at eob today w/o difficulty. However, she needs cga to come to stand and min assist to amb 30 ft. Pt reported feeling like she might \"collapse\" after only 15 ft, but she was safely able " to amb back to her chair (additional 15 ft). Pt lives alone and is high risk for injurious falls at home, especially as she has already suffered one fall. Will need IP rehab at d/c. Pt very agreeable to this. Will follow.     Time Calculation:   PT Charges     Row Name 07/26/21 1400             Time Calculation    Start Time  1222  -CM      Stop Time  1245  -CM      Time Calculation (min)  23 min  -CM      PT Received On  07/26/21  -CM      PT - Next Appointment  07/28/21  -CM      PT Goal Re-Cert Due Date  08/09/21  -CM         Time Calculation- PT    Total Timed Code Minutes- PT  8 minute(s)  -CM        User Key  (r) = Recorded By, (t) = Taken By, (c) = Cosigned By    Initials Name Provider Type    Ora Tony, PT Physical Therapist        Therapy Charges for Today     Code Description Service Date Service Provider Modifiers Qty    86140172917 HC PT EVAL MOD COMPLEXITY 3 7/26/2021 Ora Calderón, PT  1    82326471659 HC GAIT TRAINING EA 15 MIN 7/26/2021 Ora Calderón, PT  1          PT G-Codes  Outcome Measure Options: AM-PAC 6 Clicks Basic Mobility (PT)  AM-PAC 6 Clicks Score (PT): 18    Ora Calderón PT  7/26/2021

## 2021-07-26 NOTE — CONSULTS
"GI CONSULT  NOTE:    Referring Provider:  Dr. Rodriguez    Chief complaint: Nausea    Subjective  \"I have had worsening nausea\"    History of present illness: Catalina Moreno is a 81 y.o. female who has a history of chronic pain on narcotics, cholecystectomy, A. fib on Eliquis, pulmonary hypertension and chronic nausea.  She presents with a 1 week history of worsening nausea that is constant without vomiting.  It is worse with activity and she has had associated weakness.  She denies constipation and reports some looser stools at times and will take Imodium as needed.  No melena or rectal bleeding.  No heartburn, difficulty swallowing or NSAID use.  She has had a decreased appetite though with an unintentional 5 pound weight loss.  Denies current pain or shortness of breath.  Gallbladder absent.    She was admitted with A. fib and RVR and now rate controlled.    Endo History:  She reports last colonoscopy by Dr. Ortiz in [place]  9/2019 colonoscopy by Dr. Schaffer -polyps (TA), hemorrhoids, 1.2 cm polyp in the ascending colon not removed secondary to anticoagulation.  7/2012 EGD -bile gastritis    Past Medical History:  Past Medical History:   Diagnosis Date   • Anxiety    • Arthritis    • Atrial fibrillation (CMS/HCC)     paroxysmal   • Broken shoulder     left-- due to fall 11-7-19 was at [facility]   • Buttock pain     rt side   • DDD (degenerative disc disease), lumbar    • Depression    • Edema     9/2020 foot   • GERD (gastroesophageal reflux disease)    • H/O fall    • Hip pain     rt   • Hypertension    • Hypothyroidism    • Insomnia    • Leg pain     rt   • Low back pain    • Neuropathy    • PONV (postoperative nausea and vomiting)    • Pulmonary hypertension (CMS/HCC)    • Radiculopathy    • Restless legs    • Spondylolisthesis    • Urinary incontinence        Past Surgical History:  Past Surgical History:   Procedure Laterality Date   • BACK SURGERY  07/19/2018    PDC &  PSF L3-L5 insitu   • CARDIAC " CATHETERIZATION N/A 4/2/2021    Procedure: Cardiac Catheterization/Vascular Study;  Surgeon: Barrett Evans MD;  Location: [facility] CATH INVASIVE LOCATION;  Service: Cardiovascular;  Laterality: N/A;   • CHOLECYSTECTOMY     • COLONOSCOPY     • HYSTERECTOMY     • ROTATOR CUFF REPAIR Left        Social History:  Social History     Tobacco Use   • Smoking status: Never Smoker   • Smokeless tobacco: Never Used   Vaping Use   • Vaping Use: Never used   Substance Use Topics   • Alcohol use: No   • Drug use: No       Family History:  Family History   Problem Relation Age of Onset   • Cancer Father    • Diabetes Son    • Cancer Paternal Aunt    • Heart disease Paternal Aunt    • Cancer Paternal Uncle        Medications:  Medications Prior to Admission   Medication Sig Dispense Refill Last Dose   • apixaban (ELIQUIS) 5 MG tablet tablet Take 1 tablet by mouth Every 12 (Twelve) Hours. 180 tablet 3 7/24/2021 at Unknown time   • famotidine (PEPCID) 20 MG tablet Take 20 mg by mouth Daily.  0 7/24/2021 at Unknown time   • furosemide (LASIX) 40 MG tablet Take 40 mg by mouth 2 (Two) Times a Day. Uses for leg swelling   7/24/2021 at Unknown time   • HYDROcodone-acetaminophen (NORCO) 7.5-325 MG per tablet Take 1 tablet by mouth 4 (Four) Times a Day As Needed for Severe Pain . DNF before 6/29/2021 120 tablet 0    • levothyroxine (SYNTHROID, LEVOTHROID) 75 MCG tablet Take 75 mcg by mouth Daily. Take preop  0 Past Week at Unknown time   • lidocaine (LIDODERM) 5 % Place 1 patch on the skin as directed by provider Daily As Needed for Mild Pain . Remove & Discard patch within 12 hours or as directed by MD      • metoprolol succinate XL (TOPROL-XL) 50 MG 24 hr tablet TAKE 1 TABLET BY MOUTH ONCE DAILY 90 tablet 3 7/24/2021 at Unknown time   • pantoprazole (PROTONIX) 40 MG EC tablet Take 40 mg by mouth Daily With Lunch. Afternoons  0 7/24/2021 at Unknown time   • rOPINIRole (REQUIP) 0.25 MG tablet Take 1 tablet by mouth Every Night.  Take 1 hour before bedtime 30 tablet 5 7/24/2021 at Unknown time   • sildenafil (REVATIO) 20 MG tablet Take 20 mg by mouth 2 (Two) Times a Day.   7/24/2021 at Unknown time   • traZODone (DESYREL) 50 MG tablet Take 25 mg by mouth Every Night.   7/24/2021 at Unknown time   • sertraline (ZOLOFT) 50 MG tablet Take 50 mg by mouth Every Night.          Scheduled Meds:apixaban, 5 mg, Oral, Q12H  furosemide, 40 mg, Oral, BID  gabapentin, 600 mg, Oral, BID  levothyroxine, 75 mcg, Oral, Daily  lidocaine, 1 patch, Transdermal, Q24H  metoprolol succinate XL, 50 mg, Oral, Daily  pantoprazole, 40 mg, Intravenous, Q AM  rOPINIRole, 0.25 mg, Oral, Nightly  sertraline, 50 mg, Oral, Nightly  sildenafil, 20 mg, Oral, BID  sodium chloride, 10 mL, Intravenous, Q12H  traZODone, 25 mg, Oral, Nightly      Continuous Infusions:dilTIAZem, 5-15 mg/hr, Last Rate: 5 mg/hr (07/25/21 2049)      PRN Meds:.•  acetaminophen **OR** acetaminophen **OR** acetaminophen  •  HYDROcodone-acetaminophen  •  ondansetron  •  potassium chloride **OR** potassium chloride **OR** potassium chloride  •  [COMPLETED] Insert peripheral IV **AND** sodium chloride  •  sodium chloride    ALLERGIES:  Baclofen, Codeine, Ibuprofen, Methocarbamol, Naproxen, Tizanidine hcl, and Tolmetin    ROS:  The following systems were reviewed   Constitution:  No fevers, chills, no unintentional weight loss  Skin: no rash, no jaundice  Eyes:  No blurry vision, no eye pain  HENT:  No change in hearing or smell  Resp:  No dyspnea or cough  CV:  No chest pain or palpitations  :  No dysuria, hematuria  Musculoskeletal:  No leg cramps or arthralgias  Neuro:  No tremor, no numbness  Psych:  No depression or confusion    Objective Resting in bed, no acute distress, nurse in room    Vital Signs:   Vitals:    07/25/21 2357 07/26/21 0235 07/26/21 0545 07/26/21 0550   BP:  95/42 109/52 109/52   BP Location:  Right arm Left arm    Patient Position:  Lying Lying    Pulse: 97 73 65 74   Resp:  12 18     Temp:  98.5 °F (36.9 °C) 97.9 °F (36.6 °C)    TempSrc:  Oral Oral    SpO2:  94% 100%    Weight:   78.3 kg (172 lb 9.9 oz)    Height:           Physical Exam:       General Appearance:    Awake and alert, in no acute distress, overweight   Head:    Normocephalic, without obvious abnormality, atraumatic   Throat:   No oral lesions, no thrush, oral mucosa moist   Lungs:     Respirations regular, even and unlabored   Chest Wall:    No abnormalities observed   Abdomen:     Soft, non-tender, nondistended   Rectal:     Deferred   Extremities:   Moves all extremities, no cyanosis   Pulses:   Pulses palpable and equal bilaterally   Skin:   No rash, no jaundice, normal palpation       Neurologic:   Cranial nerves 2 - 12 grossly intact       Results Review:   I reviewed the patient's labs and imaging.  CBC    Results from last 7 days   Lab Units 07/26/21  0300 07/25/21  1323   WBC 10*3/mm3 9.10 8.70   HEMOGLOBIN g/dL 12.2 14.0   PLATELETS 10*3/mm3 341 365     CMP   Results from last 7 days   Lab Units 07/26/21  0300 07/25/21  1432   SODIUM mmol/L 137 141   POTASSIUM mmol/L 3.1* 3.0*   CHLORIDE mmol/L 97* 99   CO2 mmol/L 26.0 26.0   BUN mg/dL 16 17   CREATININE mg/dL 0.92 0.94   GLUCOSE mg/dL 106* 120*   ALBUMIN g/dL  --  4.40   BILIRUBIN mg/dL  --  0.9   ALK PHOS U/L  --  103   AST (SGOT) U/L  --  22   ALT (SGPT) U/L  --  13   MAGNESIUM mg/dL  --  1.9   LIPASE U/L  --  12*     Cr Clearance Estimated Creatinine Clearance: 48.4 mL/min (by C-G formula based on SCr of 0.92 mg/dL).  Coag   Results from last 7 days   Lab Units 07/25/21  1323   INR  1.08   APTT seconds 29.4     HbA1C   Lab Results   Component Value Date    HGBA1C 5.6 06/04/2019     Blood Glucose No results found for: POCGLU  Infection     UA    Results from last 7 days   Lab Units 07/25/21  1420   NITRITE UA  Negative     Radiology(recent) CT Abdomen Pelvis Without Contrast    Result Date: 7/25/2021  1. No acute abnormality is identified. 2. The appendix is not  delineated. No secondary signs of appendicitis. 3. Additional findings as reported above.  Electronically Signed By-Maia Finney MD On:7/25/2021 4:46 PM This report was finalized on [id number] by  Maia Finney MD.    XR Chest 1 View    Result Date: 7/25/2021  1.  No evidence of active disease.   Electronically Signed By-Maia Finney MD On:7/25/2021 1:58 PM This report was finalized on [id number] by  Maia Finney MD.         ASSESSMENT:  Acute on chronic nausea  A. fib with RVR -Eliquis  Pulmonary hypertension  Acute on chronic CHF  History of cholecystectomy  Chronic pain on narcotics    PLAN:  Patient presents with acute on chronic nausea and some intermittent associated epigastric abdominal pain.  No vomiting.  CT and chest x-ray unremarkable.  LFTs and lipase normal.  Hemoglobin normal.  Admitted with A. fib with RVR and CHF exacerbation, now rate controlled.  On Lasix.  Patient has had numerous EGDs in the past for similar symptoms with nonerosive gastritis.  Narcotic induced gastroparesis could be contributing.  We will hold off on repeat endoscopy at this time and treat supportively.  Start PPI and Reglan.  Advance diet as tolerated.  Will follow.  Discussed with bedside RN this morning on rounds.      I discussed the patients findings and my recommendations with the patient.    We appreciate the referral    KALYANI Newton  07/26/21  09:18 EDT

## 2021-07-26 NOTE — PLAN OF CARE
"Goal Outcome Evaluation:  Plan of Care Reviewed With: patient           Outcome Summary: 80 yo female adm for a fib w/ rvr, ae chf. Pt reports she had been feeling ill and weak, and she had pretty much not gotten out of bed for one week except to go to the bathroom prior to admit. During one of these walks to bathroom, she fell into her tub when she was too weak to continue ambulating. Pt normally ambulates around a large house w/ a rollator wx independently. She is not normally on any O2. Pt has also been reporting epigastric pain, which GI states is due to chronic narcotic use. Pt is able to come to sit at eob today w/o difficulty. However, she needs cga to come to stand and min assist to amb 30 ft. Pt reported feeling like she might \"collapse\" after only 15 ft, but she was safely able to amb back to her chair (additional 15 ft). Pt lives alone and is high risk for injurious falls at home, especially as she has already suffered one fall. Will need IP rehab at d/c. Pt very agreeable to this. Will follow.  "

## 2021-07-26 NOTE — NURSING NOTE
Patient went to bed side commode heart ran up to 130 stayed for fes seconds patient was little lite headed. Helped her back to bed. Heart rate went back down to 90's quickly.

## 2021-07-26 NOTE — PROGRESS NOTES
[facility] Medicine Services Daily Progress Note    Patient Name: Catalina Moreno  : 1940  MRN: [id number]  Primary Care Physician:  Anayeli Costa APRN  Date of admission: 2021      Subjective      Chief Complaint: Nausea and diarrhea         Patient Reports some abdominal pain and nausea    Review of Systems   HENT: Negative.    Cardiovascular: Negative.    Endocrine: Negative.    Musculoskeletal: Negative.    Gastrointestinal: Positive for abdominal pain and nausea.   Genitourinary: Negative.    Neurological: Negative.    Psychiatric/Behavioral: Negative.    Allergic/Immunologic: Negative.          Objective      Vitals:   Temp:  [97.9 °F (36.6 °C)-98.5 °F (36.9 °C)] 97.9 °F (36.6 °C)  Heart Rate:  [] 80  Resp:  [12-20] 16  BP: ()/(42-99) 141/48  Flow (L/min):  [2] 2    Physical Exam  Vitals and nursing note reviewed.   Constitutional:       Appearance: Normal appearance.   Eyes:      Extraocular Movements: Extraocular movements intact.      Pupils: Pupils are equal, round, and reactive to light.   Cardiovascular:      Rate and Rhythm: Tachycardia present.      Pulses: Normal pulses.   Pulmonary:      Effort: Pulmonary effort is normal.      Breath sounds: Normal breath sounds.   Abdominal:      General: There is no distension.   Musculoskeletal:      Cervical back: Normal range of motion and neck supple.   Neurological:      General: No focal deficit present.      Mental Status: She is alert and oriented to person, place, and time.            Result Review    Result Review:  I have personally reviewed the results from the time of this admission to 2021 14:13 EDT and agree with these findings:  [x]  Laboratory  []  Microbiology  []  Radiology  []  EKG/Telemetry   []  Cardiology/Vascular   []  Pathology  []  Old records  []  Other:  Most notable findings include: CBC normal except elevated RDW, potassium 3.1, chloride 97, blood glucose 106  mg/dL          Assessment/Plan      Brief Patient Summary:  Catalina Moreno is a 81 y.o. female  who presented to [facility] on 7/25/2021 complaining of Chief complaint: Patient is an 81-year-old female.  She states she has been in bed for a week.  She states she can eat or cannot drink.  She has upper abdominal pain.  She states however she has taken all of her meds appropriately.  She has not felt tachycardia or palpitations or chest pain or shortness of breath.  No cough or fever.  She states that the symptoms above are the only symptoms that she is having.  No other symptoms.        dilTIAZem, 5-15 mg/hr, Last Rate: Stopped (07/26/21 0830)         Active Hospital Problems:  Active Hospital Problems    Diagnosis    • **Atrial fibrillation with rapid ventricular response (CMS/HCC)    • Pulmonary hypertension (CMS/HCC)    • Acute on chronic diastolic congestive heart failure (CMS/HCC)    • Essential hypertension    • Hypothyroidism      Assessment/plan:   Atrial fibrillation with RVR: Currently rate controlled  Currently on metoprolol  Calcium channel blockers  Patient received digoxin 500 mcg 1 dose and then 50 mcg 2 doses, was on Cardizem drip currently on hold secondary to heart rate controlled around 60 to 80s  Anticoagulation per CHADS VASC2 SCORE  Close monitoring cardiology has been consulted, check echocardiogram        CHF:  Diuresis-loop diuretics  Spironolactone if tolerated  Thiazides if tolerated  ACE inhibitor if tolerated  Beta-blocker  Possible SGLT2 inhibitor trial if EF<40% (EMPEROR reduced)  Check echocardiogram  Patient had a cardiac cath on 4/2/2021 with no obstructive coronary artery disease        Hypertension:  Continue home medications   Options include-  beta-blockers  Calcium channel blockers  ACE inhibitor  Vasodilators  Low-dose diuretics  PRN medications have not been shown to affect outcomes-to be avoided    Chronic pain syndrome; postlaminectomy syndrome of lumbar region;  continue with lidocaine patch     Rule out peptic ulcer disease  Consult gastroenterology for possible EGD     DVT prophylaxis:  No DVT prophylaxis order currently exists.      DVT prophylaxis:  Medical DVT prophylaxis orders are present.    CODE STATUS:    Code Status: CPR  Medical Interventions (Level of Support Prior to Arrest): Full      Disposition:  I expect patient to be discharged 3 to 4 days    This patient has been examined wearing appropriate Personal Protective Equipment and discussed with hospital infection control department. 07/26/21      Electronically signed by Alexa Rodriguez MD, 07/26/21, 14:13 EDT.  [facility] Hospitalist Team

## 2021-07-27 VITALS
DIASTOLIC BLOOD PRESSURE: 60 MMHG | HEART RATE: 93 BPM | OXYGEN SATURATION: 100 % | TEMPERATURE: 98.6 F | RESPIRATION RATE: 20 BRPM | SYSTOLIC BLOOD PRESSURE: 135 MMHG | WEIGHT: 176.15 LBS | BODY MASS INDEX: 31.21 KG/M2 | HEIGHT: 63 IN

## 2021-07-27 PROBLEM — I48.91 ATRIAL FIBRILLATION WITH RAPID VENTRICULAR RESPONSE: Status: RESOLVED | Noted: 2021-07-25 | Resolved: 2021-07-27

## 2021-07-27 LAB
ALBUMIN SERPL-MCNC: 3.9 G/DL (ref 3.5–5.2)
ALBUMIN/GLOB SERPL: 1.3 G/DL
ALP SERPL-CCNC: 89 U/L (ref 39–117)
ALT SERPL W P-5'-P-CCNC: 10 U/L (ref 1–33)
ANION GAP SERPL CALCULATED.3IONS-SCNC: 9 MMOL/L (ref 5–15)
AST SERPL-CCNC: 17 U/L (ref 1–32)
BASOPHILS # BLD AUTO: 0 10*3/MM3 (ref 0–0.2)
BASOPHILS NFR BLD AUTO: 0.4 % (ref 0–1.5)
BILIRUB SERPL-MCNC: 0.5 MG/DL (ref 0–1.2)
BUN SERPL-MCNC: 18 MG/DL (ref 8–23)
BUN/CREAT SERPL: 15.1 (ref 7–25)
CALCIUM SPEC-SCNC: 8.9 MG/DL (ref 8.6–10.5)
CHLORIDE SERPL-SCNC: 99 MMOL/L (ref 98–107)
CO2 SERPL-SCNC: 30 MMOL/L (ref 22–29)
CREAT SERPL-MCNC: 1.19 MG/DL (ref 0.57–1)
DEPRECATED RDW RBC AUTO: 48.6 FL (ref 37–54)
EOSINOPHIL # BLD AUTO: 0.4 10*3/MM3 (ref 0–0.4)
EOSINOPHIL NFR BLD AUTO: 5.1 % (ref 0.3–6.2)
ERYTHROCYTE [DISTWIDTH] IN BLOOD BY AUTOMATED COUNT: 15.7 % (ref 12.3–15.4)
GFR SERPL CREATININE-BSD FRML MDRD: 44 ML/MIN/1.73
GLOBULIN UR ELPH-MCNC: 3.1 GM/DL
GLUCOSE SERPL-MCNC: 135 MG/DL (ref 65–99)
HCT VFR BLD AUTO: 35.5 % (ref 34–46.6)
HGB BLD-MCNC: 11.7 G/DL (ref 12–15.9)
LYMPHOCYTES # BLD AUTO: 3.4 10*3/MM3 (ref 0.7–3.1)
LYMPHOCYTES NFR BLD AUTO: 42.2 % (ref 19.6–45.3)
MCH RBC QN AUTO: 28.8 PG (ref 26.6–33)
MCHC RBC AUTO-ENTMCNC: 32.9 G/DL (ref 31.5–35.7)
MCV RBC AUTO: 87.8 FL (ref 79–97)
MONOCYTES # BLD AUTO: 0.8 10*3/MM3 (ref 0.1–0.9)
MONOCYTES NFR BLD AUTO: 9.7 % (ref 5–12)
NEUTROPHILS NFR BLD AUTO: 3.4 10*3/MM3 (ref 1.7–7)
NEUTROPHILS NFR BLD AUTO: 42.6 % (ref 42.7–76)
NRBC BLD AUTO-RTO: 0.8 /100 WBC (ref 0–0.2)
PLATELET # BLD AUTO: 312 10*3/MM3 (ref 140–450)
PMV BLD AUTO: 8.7 FL (ref 6–12)
POTASSIUM SERPL-SCNC: 3.9 MMOL/L (ref 3.5–5.2)
PROT SERPL-MCNC: 7 G/DL (ref 6–8.5)
RBC # BLD AUTO: 4.04 10*6/MM3 (ref 3.77–5.28)
SODIUM SERPL-SCNC: 138 MMOL/L (ref 136–145)
WBC # BLD AUTO: 8.1 10*3/MM3 (ref 3.4–10.8)

## 2021-07-27 PROCEDURE — 97530 THERAPEUTIC ACTIVITIES: CPT

## 2021-07-27 PROCEDURE — 97535 SELF CARE MNGMENT TRAINING: CPT

## 2021-07-27 PROCEDURE — 80053 COMPREHEN METABOLIC PANEL: CPT | Performed by: NURSE PRACTITIONER

## 2021-07-27 PROCEDURE — 97166 OT EVAL MOD COMPLEX 45 MIN: CPT

## 2021-07-27 PROCEDURE — 99233 SBSQ HOSP IP/OBS HIGH 50: CPT | Performed by: NURSE PRACTITIONER

## 2021-07-27 PROCEDURE — G0378 HOSPITAL OBSERVATION PER HR: HCPCS

## 2021-07-27 PROCEDURE — 85025 COMPLETE CBC W/AUTO DIFF WBC: CPT | Performed by: INTERNAL MEDICINE

## 2021-07-27 PROCEDURE — 99217 PR OBSERVATION CARE DISCHARGE MANAGEMENT: CPT | Performed by: HOSPITALIST

## 2021-07-27 PROCEDURE — 99212 OFFICE O/P EST SF 10 MIN: CPT | Performed by: NURSE PRACTITIONER

## 2021-07-27 RX ORDER — HYDROCODONE BITARTRATE AND ACETAMINOPHEN 7.5; 325 MG/1; MG/1
1 TABLET ORAL 4 TIMES DAILY PRN
Qty: 12 TABLET | Refills: 0 | Status: SHIPPED | OUTPATIENT
Start: 2021-07-27 | End: 2021-08-09 | Stop reason: SDUPTHER

## 2021-07-27 RX ORDER — GABAPENTIN 600 MG/1
600 TABLET ORAL 2 TIMES DAILY
Qty: 60 TABLET | Refills: 0
Start: 2021-07-27 | End: 2021-11-02

## 2021-07-27 RX ORDER — DIGOXIN 125 MCG
125 TABLET ORAL
Qty: 30 TABLET | Refills: 0 | Status: ON HOLD | OUTPATIENT
Start: 2021-07-27 | End: 2022-03-07

## 2021-07-27 RX ORDER — DIGOXIN 125 MCG
125 TABLET ORAL
Status: DISCONTINUED | OUTPATIENT
Start: 2021-07-27 | End: 2021-07-27 | Stop reason: HOSPADM

## 2021-07-27 RX ADMIN — METOCLOPRAMIDE 10 MG: 10 TABLET ORAL at 11:27

## 2021-07-27 RX ADMIN — SILDENAFIL 20 MG: 20 TABLET, FILM COATED ORAL at 08:59

## 2021-07-27 RX ADMIN — PANTOPRAZOLE SODIUM 40 MG: 40 TABLET, DELAYED RELEASE ORAL at 08:59

## 2021-07-27 RX ADMIN — LIDOCAINE 1 PATCH: 50 PATCH TOPICAL at 09:00

## 2021-07-27 RX ADMIN — DIGOXIN 125 MCG: 125 TABLET ORAL at 16:25

## 2021-07-27 RX ADMIN — HYDROCODONE BITARTRATE AND ACETAMINOPHEN 1 TABLET: 7.5; 325 TABLET ORAL at 16:25

## 2021-07-27 RX ADMIN — PSYLLIUM HUSK 1 PACKET: 3.4 POWDER ORAL at 11:26

## 2021-07-27 RX ADMIN — FUROSEMIDE 40 MG: 40 TABLET ORAL at 09:00

## 2021-07-27 RX ADMIN — HYDROCODONE BITARTRATE AND ACETAMINOPHEN 1 TABLET: 7.5; 325 TABLET ORAL at 04:54

## 2021-07-27 RX ADMIN — METOPROLOL SUCCINATE 50 MG: 50 TABLET, EXTENDED RELEASE ORAL at 08:59

## 2021-07-27 RX ADMIN — GABAPENTIN 600 MG: 600 TABLET, FILM COATED ORAL at 08:59

## 2021-07-27 RX ADMIN — LEVOTHYROXINE SODIUM 75 MCG: 0.07 TABLET ORAL at 08:59

## 2021-07-27 RX ADMIN — Medication 10 ML: at 09:00

## 2021-07-27 RX ADMIN — PANTOPRAZOLE SODIUM 40 MG: 40 TABLET, DELAYED RELEASE ORAL at 16:25

## 2021-07-27 RX ADMIN — APIXABAN 5 MG: 5 TABLET, FILM COATED ORAL at 09:00

## 2021-07-27 RX ADMIN — METOCLOPRAMIDE 10 MG: 10 TABLET ORAL at 09:00

## 2021-07-27 RX ADMIN — METOCLOPRAMIDE 10 MG: 10 TABLET ORAL at 16:25

## 2021-07-27 NOTE — PLAN OF CARE
Goal Outcome Evaluation:  Plan of Care Reviewed With: patient           Outcome Summary: Pt is 82 yo female adm for a fib w/ rvr, ae chf. Pt reports she had been feeling ill and weak, and she had pretty much not gotten out of bed for one week except to go to the bathroom prior to admit.  Pt reported fall in bathroom recently. At baseline, utilizes RW, and is on room air. States she lives alone but has assist of son intermittently. Pt CGA - Supervision for most ambulation and transfers, but is limited with decreased activity tolerance throughout session and requires frequent rest breaks. Pt Set up - Min A for ADLs, and has limited social support due to family's work schedules. She is below baseline and will require IP rehab at discharge.

## 2021-07-27 NOTE — CASE MANAGEMENT/SOCIAL WORK
Continued Stay Note   Gabriel     Patient Name: Catalina Moreno  MRN: [id number]  Today's Date: 7/27/2021    Admit Date: 7/25/2021    Discharge Plan     Row Name 07/27/21 1247       Plan    Plan  Precert approved for [facility], OK to dc if medically stable.    Row Name 07/27/21 1238       Plan    Plan  Referral to [facility], accepted, precert started 7/26. PASRR approved.    Row Name 07/27/21 1234       Plan    Plan Comments  Pt had requested possible change to [facility] ([facility]) for her inpt rehab but decided to stay with [facility]. She has requested information on any out of pocket expense. Referred to Jude richmond.        Chart review only.       Expected Discharge Date and Time     Expected Discharge Date Expected Discharge Time    Jul 28, 2021             Sukumar Glass RN

## 2021-07-27 NOTE — CASE MANAGEMENT/SOCIAL WORK
Continued Stay Note  KADEEM Rios     Patient Name: Catalina Moreno  MRN: [id number]  Today's Date: 7/27/2021    Admit Date: 7/25/2021    Discharge Plan     Row Name 07/27/21 1632       Plan    Plan Comments  Reported to pt that first 20 days paid stated [employer] rep. [facility] does not take her insurance. Pt OK to go to [facility].        Met with patient in room wearing PPE: mask, goggles.      Maintained distance greater than six feet and spent less than 15 minutes in the room.           Expected Discharge Date and Time     Expected Discharge Date Expected Discharge Time    Jul 27, 2021             Sukumar Glass RN

## 2021-07-27 NOTE — THERAPY EVALUATION
Patient Name: Catalina Moreno  : 1940    MRN: [id number]                              Today's Date: 2021       Admit Date: 2021    Visit Dx:     ICD-10-CM ICD-9-CM   1. Atrial fibrillation with rapid ventricular response (CMS/HCC)  I48.91 427.31   2. Abdominal pain, unspecified abdominal location  R10.9 789.00   3. Intractable vomiting, presence of nausea not specified, unspecified vomiting type  R11.10 536.2     Patient Active Problem List   Diagnosis   • Arthritis   • Neuropathic pain   • Other long term (current) drug therapy   • Polyarthralgia   • Sacroiliitis (CMS/HCC)   • Depressive disorder   • Primary fibromyalgia syndrome   • Gastroesophageal reflux disease   • Essential hypertension   • Lightheadedness   • Hypothyroidism   • Lumbar radiculopathy   • Altered mental status   • Acute on chronic diastolic congestive heart failure (CMS/HCC)   • Nonrheumatic tricuspid valve regurgitation   • Atrial fibrillation (CMS/HCC)   • Pulmonary hypertension (CMS/HCC)   • Acquired spondylolisthesis of lumbosacral region   • Spondylolisthesis, lumbar region   • Vitamin D deficiency   • Stage 2 chronic kidney disease   • DDD (degenerative disc disease), cervical   • Chronic pain   • Chronic low back pain   • Cervical stenosis of spinal canal   • Anemia   • Polypharmacy   • Atrial fibrillation with rapid ventricular response (CMS/HCC)     Past Medical History:   Diagnosis Date   • Anxiety    • Arthritis    • Atrial fibrillation (CMS/HCC)     paroxysmal   • Broken shoulder     left-- due to fall 20 was at [facility]   • Buttock pain     rt side   • DDD (degenerative disc disease), lumbar    • Depression    • Edema     2020 foot   • GERD (gastroesophageal reflux disease)    • H/O fall    • Hip pain     rt   • Hypertension    • Hypothyroidism    • Insomnia    • Leg pain     rt   • Low back pain    • Neuropathy    • PONV (postoperative nausea and vomiting)    • Pulmonary hypertension (CMS/HCC)    •  Radiculopathy    • Restless legs    • Spondylolisthesis    • Urinary incontinence      Past Surgical History:   Procedure Laterality Date   • BACK SURGERY  07/19/2018    PDC &  PSF L3-L5 insitu   • CARDIAC CATHETERIZATION N/A 4/2/2021    Procedure: Cardiac Catheterization/Vascular Study;  Surgeon: Barrett Evans MD;  Location: [facility] CATH INVASIVE LOCATION;  Service: Cardiovascular;  Laterality: N/A;   • CHOLECYSTECTOMY     • COLONOSCOPY     • HYSTERECTOMY     • ROTATOR CUFF REPAIR Left      General Information     Row Name 07/27/21 1150          OT Time and Intention    Document Type  evaluation  -ES     Mode of Treatment  occupational therapy  -ES     Row Name 07/27/21 1150          General Information    Patient Profile Reviewed  yes  -ES     Existing Precautions/Restrictions  fall  -ES     Barriers to Rehab  medically complex  -ES     Row Name 07/27/21 1150          Occupational Profile    Reason for Services/Referral (Occupational Profile)  Pt is 82 yo female adm for a fib w/ rvr, ae chf. Pt reports she had been feeling ill and weak, and she had pretty much not gotten out of bed for one week except to go to the bathroom prior to admit.  Pt reported fall in bathroom recently. At baseline, utilizes RW, and is on room air. States she lives alone but has assist of son intermittently.  -ES     Row Name 07/27/21 1150          Living Environment    Lives With  alone  -ES     Row Name 07/27/21 1150          Home Main Entrance    Number of Stairs, Main Entrance  none  -ES     Row Name 07/27/21 1150          Stairs Within Home, Primary    Number of Stairs, Within Home, Primary  none  -ES     Row Name 07/27/21 1150          Cognition    Orientation Status (Cognition)  oriented x 3;disoriented to;person thought Trump was POTUS  -ES     Row Name 07/27/21 1150          Safety Issues, Functional Mobility    Safety Issues Affecting Function (Mobility)  insight into deficits/self-awareness;safety precautions  follow-through/compliance;safety precaution awareness;problem-solving  -ES     Impairments Affecting Function (Mobility)  balance;cognition;endurance/activity tolerance;shortness of breath;strength  -ES     Cognitive Impairments, Mobility Safety/Performance  awareness, need for assistance;insight into deficits/self-awareness;judgment;safety precaution awareness;safety precaution follow-through  -ES       User Key  (r) = Recorded By, (t) = Taken By, (c) = Cosigned By    Initials Name Provider Type    ES Sarahi Rogers OT Occupational Therapist          Mobility/ADL's     Row Name 07/27/21 1152          Bed Mobility    Bed Mobility  supine-sit  -ES     Supine-Sit [place] (Bed Mobility)  verbal cues;supervision  -ES     Row Name 07/27/21 1152          Transfers    Transfers  bed-chair transfer;sit-stand transfer;toilet transfer  -     Sit-Stand [place] (Transfers)  supervision  -ES     [place] Level (Toilet Transfer)  contact guard  -ES     Assistive Device (Toilet Transfer)  commode, bedside without drop arms  -ES     Row Name 07/27/21 1152          Sit-Stand Transfer    Assistive Device (Sit-Stand Transfers)  walker, front-wheeled  -ES     Row Name 07/27/21 1152          Toilet Transfer    Type (Toilet Transfer)  sit-stand  -ES     Row Name 07/27/21 1152          Functional Mobility    Functional Mobility- Ind. Level  contact guard assist  -ES     Functional Mobility- Device  rolling walker  -ES     Row Name 07/27/21 1152          Activities of Daily Living    BADL Assessment/Intervention  upper body dressing;lower body dressing;toileting  -ES     Row Name 07/27/21 1152          Upper Body Dressing Assessment/Training    [place] Level (Upper Body Dressing)  set up  -ES     Row Name 07/27/21 1152          Lower Body Dressing Assessment/Training    [place] Level (Lower Body Dressing)  minimum assist (75% patient effort)  -ES     Row Name 07/27/21 1152          Toileting  Assessment/Training    [place] Level (Toileting)  minimum assist (75% patient effort)  -ES       User Key  (r) = Recorded By, (t) = Taken By, (c) = Cosigned By    Initials Name Provider Type    Sarahi Amos OT Occupational Therapist        Obj/Interventions     Row Name 07/27/21 1155          Sensory Assessment (Somatosensory)    Sensory Assessment (Somatosensory)  UE sensation intact  -ES     Row Name 07/27/21 1155          Vision Assessment/Intervention    Visual Impairment/Limitations  corrective lenses for reading  -ES     Row Name 07/27/21 1155          Range of Motion Comprehensive    General Range of Motion  bilateral upper extremity ROM WFL  -ES     Row Name 07/27/21 1155          Strength Comprehensive (MMT)    General Manual Muscle Testing (MMT) Assessment  lower extremity strength deficits identified;upper extremity strength deficits identified  -ES     [facility] Name 07/27/21 1155          Upper Extremity (Manual Muscle Testing)    Comment, MMT: Upper Extremity  BUE 4/5  -ES     Row Name 07/27/21 1155          Motor Skills    Motor Skills  functional endurance  -ES     Functional Endurance  Limited to 4 minutes standing activity tolerance before requiring seated rest break  -ES     Row Name 07/27/21 1155          Balance    Balance Assessment  sitting static balance;standing static balance;standing dynamic balance  -ES     Static Sitting Balance  WNL  -ES     Dynamic Sitting Balance  WFL  -ES     Static Standing Balance  WFL  -ES     Dynamic Standing Balance  mild impairment  -ES     Balance Interventions  sitting;standing;supported;static;dynamic  -ES       User Key  (r) = Recorded By, (t) = Taken By, (c) = Cosigned By    Initials Name Provider Type    Sarahi Amos OT Occupational Therapist        Goals/Plan     Row Name 07/27/21 1158          Bathing Goal 1 (OT)    Activity/Device (Bathing Goal 1, OT)  bathing skills, all  -ES     [place] Level/Cues Needed (Bathing Goal 1, OT)   supervision required  -ES     Time Frame (Bathing Goal 1, OT)  2 weeks  -ES     Row Name 07/27/21 1158          Dressing Goal 1 (OT)    Activity/Device (Dressing Goal 1, OT)  dressing skills, all  -ES     [place]/Cues Needed (Dressing Goal 1, OT)  modified independence  -ES     Time Frame (Dressing Goal 1, OT)  2 weeks  -ES     Row Name 07/27/21 1158          Strength Goal 1 (OT)    Strength Goal 1 (OT)  5/5 BUE  -ES     Time Frame (Strength Goal 1, OT)  2 weeks  -ES       User Key  (r) = Recorded By, (t) = Taken By, (c) = Cosigned By    Initials Name Provider Type    ES Sarahi Rogers OT Occupational Therapist        Clinical Impression     Row Name 07/27/21 1155          Pain Scale: Numbers Pre/Post-Treatment    Pretreatment Pain Rating  0/10 - no pain  -ES     Posttreatment Pain Rating  0/10 - no pain  -ES     Row Name 07/27/21 1150          Plan of Care Review    Plan of Care Reviewed With  patient  -ES     Outcome Summary  Pt is 80 yo female adm for a fib w/ rvr, ae chf. Pt reports she had been feeling ill and weak, and she had pretty much not gotten out of bed for one week except to go to the bathroom prior to admit.  Pt reported fall in bathroom recently. At baseline, utilizes RW, and is on room air. States she lives alone but has assist of son intermittently. Pt CGA - Supervision for most ambulation and transfers, but is limited with decreased activity tolerance throughout session and requires frequent rest breaks. Pt Set up - Min A for ADLs, and has limited social support due to family's work schedules. She is below baseline and will require IP rehab at discharge.  -ES     Row Name 07/27/21 4621          Therapy Assessment/Plan (OT)    Criteria for Skilled Therapeutic Interventions Met (OT)  yes  -ES     Therapy Frequency (OT)  3 times/wk  -ES     Row Name 07/27/21 4008          Therapy Plan Review/Discharge Plan (OT)    Anticipated Discharge Disposition (OT)  inpatient rehabilitation facility   -ES     Row Name 07/27/21 1157          Vital Signs    Pretreatment Heart Rate (beats/min)  89  -ES     Intratreatment Heart Rate (beats/min)  124  -ES     Posttreatment Heart Rate (beats/min)  109  -ES     O2 Delivery Pre Treatment  room air  -ES     O2 Delivery Intra Treatment  room air  -ES     O2 Delivery Post Treatment  room air  -ES     Pre Patient Position  Supine  -ES     Intra Patient Position  Standing  -ES     Post Patient Position  Sitting  -ES     Row Name 07/27/21 1157          Positioning and Restraints    Pre-Treatment Position  in bed  -ES     Post Treatment Position  chair  -ES     In Chair  notified nsg;call light within reach;encouraged to call for assist;exit alarm on  -ES       User Key  (r) = Recorded By, (t) = Taken By, (c) = Cosigned By    Initials Name Provider Type    Sarahi Amos OT Occupational Therapist        Outcome Measures     Row Name 07/27/21 1159          How much help from another is currently needed...    Putting on and taking off regular lower body clothing?  3  -ES     Bathing (including washing, rinsing, and drying)  3  -ES     Toileting (which includes using toilet bed pan or urinal)  3  -ES     Putting on and taking off regular upper body clothing  4  -ES     Taking care of personal grooming (such as brushing teeth)  4  -ES     Eating meals  4  -ES     AM-PAC 6 Clicks Score (OT)  21  -ES     Row Name 07/27/21 1159          Functional Assessment    Outcome Measure Options  AM-PAC 6 Clicks Daily Activity (OT)  -ES       User Key  (r) = Recorded By, (t) = Taken By, (c) = Cosigned By    Initials Name Provider Type    Sarahi Amos OT Occupational Therapist          Occupational Therapy Education                 Title: PT OT SLP Therapies (In Progress)     Topic: Occupational Therapy (In Progress)     Point: ADL training (Done)     Description:   Instruct learner(s) on proper safety adaptation and remediation techniques during self care or transfers.    Instruct in proper use of assistive devices.              Learning Progress Summary           Patient Acceptance, E,TB, VU by TEJINDER at 7/27/2021 1201                   Point: Home exercise program (Not Started)     Description:   Instruct learner(s) on appropriate technique for monitoring, assisting and/or progressing therapeutic exercises/activities.              Learner Progress:  Not documented in this visit.          Point: Precautions (Done)     Description:   Instruct learner(s) on prescribed precautions during self-care and functional transfers.              Learning Progress Summary           Patient Acceptance, E,TB, VU by TEJINDER at 7/27/2021 1201                   Point: Body mechanics (Done)     Description:   Instruct learner(s) on proper positioning and spine alignment during self-care, functional mobility activities and/or exercises.              Learning Progress Summary           Patient Acceptance, E,TB, VU by TEJINDER at 7/27/2021 1201                               User Key     Initials Effective Dates Name Provider Type Discipline    TEJINDER 06/16/21 -  Sarahi Rogers OT Occupational Therapist OT              OT Recommendation and Plan  Therapy Frequency (OT): 3 times/wk  Plan of Care Review  Plan of Care Reviewed With: patient  Outcome Summary: Pt is 82 yo female adm for a fib w/ rvr, ae chf. Pt reports she had been feeling ill and weak, and she had pretty much not gotten out of bed for one week except to go to the bathroom prior to admit.  Pt reported fall in bathroom recently. At baseline, utilizes RW, and is on room air. States she lives alone but has assist of son intermittently. Pt CGA - Supervision for most ambulation and transfers, but is limited with decreased activity tolerance throughout session and requires frequent rest breaks. Pt Set up - Min A for ADLs, and has limited social support due to family's work schedules. She is below baseline and will require IP rehab at discharge.     Time  Calculation:   Time Calculation- OT     Row Name 07/27/21 1200             Time Calculation- OT    OT Start Time  1130 split tx with prevoius AM session  -ES      OT Stop Time  1200  -ES      OT Time Calculation (min)  30 min  -ES      Total Timed Code Minutes- OT  30 minute(s)  -ES      OT Received On  07/27/21  -ES      OT - Next Appointment  07/29/21  -ES      OT Goal Re-Cert Due Date  08/10/21  -ES        User Key  (r) = Recorded By, (t) = Taken By, (c) = Cosigned By    Initials Name Provider Type    Sarahi Amos OT Occupational Therapist        Therapy Charges for Today     Code Description Service Date Service Provider Modifiers Qty    83407930353 HC OT SELF CARE/MGMT/TRAIN EA 15 MIN 7/27/2021 Sarahi Rogers OT GO 1    03918989622 HC OT THERAPEUTIC ACT EA 15 MIN 7/27/2021 Sarahi Rogers OT GO 1    [id number]  OT EVAL MOD COMPLEXITY 3 7/27/2021 Sarahi Rogers OT GO 1               Sarahi Rogers OT  7/27/2021

## 2021-07-27 NOTE — PLAN OF CARE
Goal Outcome Evaluation:         No further complaints of nausea tonight. HR has been controlled. Leg pain has troubled her this morning. Norco has been effective. Continue to monitor

## 2021-07-27 NOTE — CASE MANAGEMENT/SOCIAL WORK
Continued Stay Note   Gabriel     Patient Name: Catalina Moreno  MRN: [id number]  Today's Date: 7/27/2021    Admit Date: 7/25/2021    Discharge Plan     Row Name 07/27/21 1238       Plan    Plan  Referral to [facility], accepted, precert started 7/26. PASRR approved.    Row Name 07/27/21 1234       Plan    Plan Comments  Pt had requested possible change to [facility] ([facility]) for her inpt rehab but decided to stay with [facility]. She has requested information on any out of pocket expense. Referred to [facility] liason.        Phone communication or documentation only - no physical contact with patient or family.       Expected Discharge Date and Time     Expected Discharge Date Expected Discharge Time    Jul 28, 2021             Sukumar Glass RN

## 2021-07-27 NOTE — PROGRESS NOTES
[facility]   PROGRESS NOTE    Reason for follow-up: Afib rapid with ventricular rate      Patient Care Team:  Anayeli Costa APRN as PCP - General (Family Medicine)  Barrett Evans MD as Consulting Physician (Cardiology)    Subjective .   Patient seen and examined, chart and labs reviewed   Patient feeling better though gets super weak when having Afib RVR   RN reports rates 120s when patient getting up     No chest pain or shortness of breath       Review of Systems   Constitutional: Positive for malaise/fatigue. Negative for fever.   Cardiovascular: Positive for irregular heartbeat. Negative for chest pain and dyspnea on exertion.   Respiratory: Negative for cough and shortness of breath.    Gastrointestinal: Negative for abdominal pain, nausea and vomiting.   Neurological: Positive for weakness. Negative for focal weakness.   All other systems reviewed and are negative.      Allergies:  Baclofen, Codeine, Ibuprofen, Methocarbamol, Naproxen, Tizanidine hcl, and Tolmetin    Scheduled Meds:apixaban, 5 mg, Oral, Q12H  furosemide, 40 mg, Oral, BID  gabapentin, 600 mg, Oral, BID  levothyroxine, 75 mcg, Oral, Daily  lidocaine, 1 patch, Transdermal, Q24H  metoclopramide, 10 mg, Oral, TID AC  metoprolol succinate XL, 50 mg, Oral, Daily  pantoprazole, 40 mg, Oral, BID AC  psyllium, 1 packet, Oral, Daily  rOPINIRole, 0.25 mg, Oral, Nightly  sertraline, 50 mg, Oral, Nightly  sildenafil, 20 mg, Oral, BID  sodium chloride, 10 mL, Intravenous, Q12H  traZODone, 25 mg, Oral, Nightly      Continuous Infusions:dilTIAZem, 5-15 mg/hr, Last Rate: Stopped (07/26/21 0830)      PRN Meds:.•  acetaminophen **OR** acetaminophen **OR** acetaminophen  •  HYDROcodone-acetaminophen  •  lidocaine  •  ondansetron  •  potassium chloride **OR** potassium chloride **OR** potassium chloride  •  [COMPLETED] Insert peripheral IV **AND** sodium chloride  •  sodium chloride    Objective   Looks  "comfortable sitting in the chair     VITAL SIGNS  Vitals:    07/26/21 1544 07/27/21 0223 07/27/21 0532 07/27/21 1037   BP:  113/51 (!) 109/35 135/60   BP Location:  Left arm Left arm    Patient Position:  Lying Lying    Pulse:  97 77 93   Resp:  16 18 20   Temp:  98.1 °F (36.7 °C) 97.2 °F (36.2 °C) 98.6 °F (37 °C)   TempSrc:  Oral Oral Oral   SpO2:  91% 97% 100%   Weight: 78 kg (172 lb)  79.9 kg (176 lb 2.4 oz)    Height: 160 cm (63\")          Flowsheet Rows      First Filed Value   Admission Height  154.9 cm (61\") Documented at 07/25/2021 1245   Admission Weight  72.6 kg (160 lb) Documented at 07/25/2021 1245           TELEMETRY: afib intermittent RVR     Physical Exam:  Vitals and nursing note reviewed.   Constitutional:       Appearance: Healthy appearance. Not in distress.   Neck:      Vascular: No JVR. JVD normal.   Pulmonary:      Effort: Pulmonary effort is normal.      Breath sounds: Normal breath sounds. No wheezing. No rhonchi. No rales.   Chest:      Chest wall: Not tender to palpatation.   Cardiovascular:      PMI at left midclavicular line. Normal rate. Irregularly irregular rhythm. Normal S1. Normal S2.      Murmurs: There is a systolic murmur.      No gallop. No click. No rub.   Pulses:     Intact distal pulses.   Edema:     Peripheral edema absent.   Abdominal:      General: Bowel sounds are normal.      Palpations: Abdomen is soft.      Tenderness: There is no abdominal tenderness.   Musculoskeletal: Normal range of motion.         General: No tenderness. Skin:     General: Skin is warm and dry.   Neurological:      General: No focal deficit present.      Mental Status: Alert and oriented to person, place and time.                  LAB RESULTS (LAST 7 DAYS)    CBC  Results from last 7 days   Lab Units 07/27/21  0242 07/26/21  0300 07/25/21  1323   WBC 10*3/mm3 8.10 9.10 8.70   RBC 10*6/mm3 4.04 4.34 4.84   HEMOGLOBIN g/dL 11.7* 12.2 14.0   HEMATOCRIT % 35.5 38.1 41.5   MCV fL 87.8 88.0 85.7 "   PLATELETS 10*3/mm3 312 341 365       BMP  Results from last 7 days   Lab Units 07/27/21 0522 07/26/21 2205 07/26/21 0300 07/25/21  1432   SODIUM mmol/L 138  --  137 141   POTASSIUM mmol/L 3.9 4.8 3.1* 3.0*   CHLORIDE mmol/L 99  --  97* 99   CO2 mmol/L 30.0*  --  26.0 26.0   BUN mg/dL 18  --  16 17   CREATININE mg/dL 1.19*  --  0.92 0.94   GLUCOSE mg/dL 135*  --  106* 120*   MAGNESIUM mg/dL  --   --   --  1.9       CMP   Results from last 7 days   Lab Units 07/27/21 0522 07/26/21 2205 07/26/21 0300 07/25/21  1432   SODIUM mmol/L 138  --  137 141   POTASSIUM mmol/L 3.9 4.8 3.1* 3.0*   CHLORIDE mmol/L 99  --  97* 99   CO2 mmol/L 30.0*  --  26.0 26.0   BUN mg/dL 18  --  16 17   CREATININE mg/dL 1.19*  --  0.92 0.94   GLUCOSE mg/dL 135*  --  106* 120*   ALBUMIN g/dL 3.90  --   --  4.40   BILIRUBIN mg/dL 0.5  --   --  0.9   ALK PHOS U/L 89  --   --  103   AST (SGOT) U/L 17  --   --  22   ALT (SGPT) U/L 10  --   --  13   LIPASE U/L  --   --   --  12*         BNP        TROPONIN  Results from last 7 days   Lab Units 07/25/21  1432   TROPONIN T ng/mL <0.010       CoAg  Results from last 7 days   Lab Units 07/25/21  1323   INR  1.08   APTT seconds 29.4       Creatinine Clearance  Estimated Creatinine Clearance: 37.7 mL/min (A) (by C-G formula based on SCr of 1.19 mg/dL (H)).    ABG        Radiology  CT Abdomen Pelvis Without Contrast    Result Date: 7/25/2021  1. No acute abnormality is identified. 2. The appendix is not delineated. No secondary signs of appendicitis. 3. Additional findings as reported above.  Electronically Signed By-Maia Finney MD On:7/25/2021 4:46 PM This report was finalized on [id number] by  Maia Finney MD.    XR Chest 1 View    Result Date: 7/25/2021  1.  No evidence of active disease.   Electronically Signed By-Maia Finney MD On:7/25/2021 1:58 PM This report was finalized on [id number] by  Maia Finney MD.            EKG        I personally viewed and interpreted the patient's  EKG/Telemetry data:    ECHOCARDIOGRAM:    Results for orders placed during the hospital encounter of 07/25/21    Adult Transthoracic Echo Complete W/ Cont if Necessary Per Protocol    Interpretation Summary  · Estimated left ventricular EF = 60% Left ventricular systolic function is normal.  · Moderate aortic valve regurgitation is present.  · Estimated right ventricular systolic pressure from tricuspid regurgitation is normal (<35 mmHg).    STRESS MYOVIEW:    CARDIAC CATHETERIZATION:    OTHER:         Assessment/Plan       Atrial fibrillation with rapid ventricular response (CMS/HCC)    Essential hypertension    Hypothyroidism    Acute on chronic diastolic congestive heart failure (CMS/HCC)    Pulmonary hypertension (CMS/HCC)    PLAN:  Patient presents with Afib RVR   Needs aggressive rate control   Weakness/dizzy when getting up  Continue PO metoprolol   Add digoxin orally   Continue anticoagulation     Echocardiogram EF 60% moderate aortic stenosis     Patient needs aggressive control of sleep apnea        I discussed the patients findings and my recommendations with patient and bedside RN     KALYANI Haney  07/27/21  11:47 EDT   Electronically signed by KALYANI Haney, 07/27/21, 3:05 PM EDT.

## 2021-07-27 NOTE — DISCHARGE INSTRUCTIONS
Follow up with Dr. Martinez or Gastroenterology [facility] in 2-4 weeks

## 2021-07-27 NOTE — DISCHARGE SUMMARY
[facility] Medicine Services  DISCHARGE SUMMARY    Patient Name: Catalina Moreno  : 1940  MRN: [id number]    Date of Admission: 2021  Date of Discharge: 2021  Primary Care Physician: Anayeli Costa APRN      Presenting Problem:   Atrial fibrillation with rapid ventricular response (CMS/HCC) [I48.91]  Abdominal pain, unspecified abdominal location [R10.9]  Intractable vomiting, presence of nausea not specified, unspecified vomiting type [R11.10]    Active and Resolved Hospital Problems:  Active Hospital Problems    Diagnosis POA   • Pulmonary hypertension (CMS/HCC) [I27.20] Yes   • Acute on chronic diastolic congestive heart failure (CMS/HCC) [I50.33] Yes   • Essential hypertension [I10] Yes   • Hypothyroidism [E03.9] Yes      Resolved Hospital Problems    Diagnosis POA   • **Atrial fibrillation with rapid ventricular response (CMS/HCC) [I48.91] Yes         Hospital Course     Hospital Course:  Catalina Moreno is a 81 y.o. female Catalina Moreno is a 81 y.o. female who presented to [facility] on 2021 complaining of Chief complaint: Patient is an 81-year-old female.  She states she has been in bed for a week.  She states she can not eat or  drink.  She has upper abdominal pain.  Nausea and She states however she has taken all of her meds appropriately.  She has not felt tachycardia or palpitations or chest pain or shortness of breath.  No cough or fever.  She states that the symptoms above are the only symptoms that she is having.  No other symptoms.   Patient was in A. fib with RVR treated with IV Cardizem done she was started on IV digoxin later IV Cardizem drip was stopped on heart rate was controlled on oral metoprolol and oral digoxin patient to continue on Eliquis  Atrial fibrillation with RVR: Currently rate controlled  Currently controlled on metoprolol and p.o. digoxin  Calcium channel blockers  Patient received digoxin 500 mcg 1 dose  and then 50 mcg 2 doses, was on Cardizem drip currently on hold secondary to heart rate controlled around 60 to 80s  Anticoagulation per CHADS VASC2 SCORE, apixaban 5 mg p.o. twice daily  Close monitoring cardiology has been consulted, checked echocardiogram with EF of 60% and moderate aortic stenosis        Diastolic CHF:  Currently on Lasix 40 mg twice daily  Beta-blocker metoprolol XL 50 mg daily and  ACE inhibitor to be added as outpatient as tolerated  Possible SGLT2 inhibitor trial if EF<40% (EMPEROR reduced)  Checked echocardiogram with EF of 60%  Patient had a cardiac cath on 4/2/2021 with no obstructive coronary artery disease        Hypertension:  Continue home medications  beta-blockers  Low-dose diuretics  PRN medications have not been shown to affect outcomes-to be avoided     Chronic pain syndrome; postlaminectomy syndrome of lumbar region; continue with lidocaine patch, gabapentin and hydrocodone     Chronic abdominal pain/nausea;  patient to be discharged with Reglan and PPI  Consult gastroenterology appreciated possible underlying gastroparesis, to follow-up with Dr. Ortiz in 2 to 4 weeks     DVT prophylaxis:  No DVT prophylaxis order currently exists.        DVT prophylaxis:  Medical DVT prophylaxis orders are present.          DISCHARGE Follow Up Recommendations for labs and diagnostics: BMP      Reasons For Change In Medications and Indications for New Medications:      Day of Discharge     Vital Signs:  Temp:  [97.2 °F (36.2 °C)-98.6 °F (37 °C)] 98.6 °F (37 °C)  Heart Rate:  [74-97] 93  Resp:  [16-20] 20  BP: (109-135)/(35-60) 135/60    Physical Exam:  Physical Exam  Vitals and nursing note reviewed.   Constitutional:       Appearance: Normal appearance.   HENT:      Head: Normocephalic and atraumatic.   Eyes:      Extraocular Movements: Extraocular movements intact.      Pupils: Pupils are equal, round, and reactive to light.   Cardiovascular:      Rate and Rhythm: Normal rate. Rhythm  irregular.      Heart sounds: Normal heart sounds.   Pulmonary:      Breath sounds: Normal breath sounds.   Abdominal:      General: Abdomen is flat.      Palpations: Abdomen is soft.   Musculoskeletal:      Cervical back: Normal range of motion and neck supple.   Neurological:      General: No focal deficit present.      Mental Status: She is alert and oriented to person, place, and time.           Pertinent  and/or Most Recent Results     LAB RESULTS:      Lab 07/27/21  0242 07/26/21  0300 07/25/21  1323   WBC 8.10 9.10 8.70   HEMOGLOBIN 11.7* 12.2 14.0   HEMATOCRIT 35.5 38.1 41.5   PLATELETS 312 341 365   NEUTROS ABS 3.40 4.30 5.70   LYMPHS ABS 3.40* 3.60* 2.00   MONOS ABS 0.80 1.00* 0.90   EOS ABS 0.40 0.10 0.00   MCV 87.8 88.0 85.7   PROTIME  --   --  11.9*   APTT  --   --  29.4         Lab 07/27/21  0522 07/26/21  2205 07/26/21  0300 07/25/21  1432   SODIUM 138  --  137 141   POTASSIUM 3.9 4.8 3.1* 3.0*   CHLORIDE 99  --  97* 99   CO2 30.0*  --  26.0 26.0   ANION GAP 9.0  --  14.0 16.0*   BUN 18  --  16 17   CREATININE 1.19*  --  0.92 0.94   GLUCOSE 135*  --  106* 120*   CALCIUM 8.9  --  9.2 9.4   MAGNESIUM  --   --   --  1.9         Lab 07/27/21  0522 07/25/21  1432   TOTAL PROTEIN 7.0 8.2   ALBUMIN 3.90 4.40   GLOBULIN 3.1 3.8   ALT (SGPT) 10 13   AST (SGOT) 17 22   BILIRUBIN 0.5 0.9   ALK PHOS 89 103   LIPASE  --  12*         Lab 07/25/21  1432 07/25/21  1323   PROBNP  --  2,469.0*   TROPONIN T <0.010  --    PROTIME  --  11.9*   INR  --  1.08                 Brief Urine Lab Results  (Last result in the past 365 days)      Color   Clarity   Blood   Leuk Est   Nitrite   Protein   CREAT   Urine HCG        07/25/21 1420 Yellow  Comment:  Result checked  Clear Negative Negative Negative Negative             Microbiology Results (last 10 days)     Procedure Component Value - Date/Time    COVID PRE-OP / PRE-PROCEDURE SCREENING ORDER (NO ISOLATION) - Swab, Nasopharynx [[id number]]  (Normal) Collected: 07/25/21 1938     Lab Status: Final result Specimen: Swab from Nasopharynx Updated: 07/25/21 2042    Narrative:      The following orders were created for panel order COVID PRE-OP / PRE-PROCEDURE SCREENING ORDER (NO ISOLATION) - Swab, Nasopharynx.  Procedure                               Abnormality         Status                     ---------                               -----------         ------                     COVID-19,CEPHEID,COR/KEVEN...[[id number]]  Normal              Final result                 Please view results for these tests on the individual orders.    COVID-19,CEPHEID,COR/KEVEN/PAD/ISAÍAS IN-HOUSE(OR EMERGENT/ADD-ON),NP SWAB IN TRANSPORT MEDIA 3-4 HR TAT, RT-PCR - Swab, Nasopharynx [[id number]]  (Normal) Collected: 07/25/21 1938    Lab Status: Final result Specimen: Swab from Nasopharynx Updated: 07/25/21 2042     COVID19 Not Detected    Narrative:      Fact sheet for providers: https://www.fda.gov/media/087493/download     Fact sheet for patients: https://www.fda.gov/media/291696/download  Fact sheet for providers: https://www.fda.gov/media/584715/download     Fact sheet for patients: https://www.fda.gov/media/728905/download          CT Abdomen Pelvis Without Contrast    Result Date: 7/25/2021  Impression: 1. No acute abnormality is identified. 2. The appendix is not delineated. No secondary signs of appendicitis. 3. Additional findings as reported above.  Electronically Signed By-Maia Finney MD On:7/25/2021 4:46 PM This report was finalized on [id number] by  Maia Finney MD.    XR Chest 1 View    Result Date: 7/25/2021  Impression: 1.  No evidence of active disease.   Electronically Signed By-Maia Finney MD On:7/25/2021 1:58 PM This report was finalized on [id number] by  Maia Finney MD.      Results for orders placed during the hospital encounter of 07/25/21    Duplex Venous Lower Extremity - Bilateral    Interpretation Summary  · Normal bilateral lower extremity venous duplex scan.  · Left popliteal  fossa fluid collection.      Results for orders placed during the hospital encounter of 07/25/21    Duplex Venous Lower Extremity - Bilateral    Interpretation Summary  · Normal bilateral lower extremity venous duplex scan.  · Left popliteal fossa fluid collection.      Results for orders placed during the hospital encounter of 07/25/21    Adult Transthoracic Echo Complete W/ Cont if Necessary Per Protocol    Interpretation Summary  · Estimated left ventricular EF = 60% Left ventricular systolic function is normal.  · Moderate aortic valve regurgitation is present.  · Estimated right ventricular systolic pressure from tricuspid regurgitation is normal (<35 mmHg).      Labs Pending at Discharge:      Procedures Performed           Consults:   Consults     Date and Time Order Name Status Description    7/27/2021 12:55 PM Inpatient Pulmonology Consult Completed     7/25/2021 10:07 PM Inpatient Cardiology Consult Completed     7/25/2021  6:37 PM Inpatient Gastroenterology Consult Completed     7/25/2021  4:53 PM Hospitalist (on-call MD unless specified) Completed             Discharge Details        Discharge Medications      New Medications      Instructions Start Date   digoxin 125 MCG tablet  Commonly known as: LANOXIN   125 mcg, Oral, Daily Digoxin         Continue These Medications      Instructions Start Date   apixaban 5 MG tablet tablet  Commonly known as: ELIQUIS   5 mg, Oral, Every 12 Hours Scheduled      famotidine 20 MG tablet  Commonly known as: PEPCID   20 mg, Oral, Daily      furosemide 40 MG tablet  Commonly known as: LASIX   40 mg, Oral, 2 Times Daily, Uses for leg swelling      gabapentin 600 MG tablet  Commonly known as: NEURONTIN   600 mg, Oral, 2 Times Daily      HYDROcodone-acetaminophen 7.5-325 MG per tablet  Commonly known as: NORCO   1 tablet, Oral, 4 Times Daily PRN, DNF before 6/29/2021      levothyroxine 75 MCG tablet  Commonly known as: SYNTHROID, LEVOTHROID   75 mcg, Oral, Daily, Take  preop      lidocaine 5 %  Commonly known as: LIDODERM   1 patch, Transdermal, Daily PRN, Remove & Discard patch within 12 hours or as directed by MD       metoprolol succinate XL 50 MG 24 hr tablet  Commonly known as: TOPROL-XL   TAKE 1 TABLET BY MOUTH ONCE DAILY      pantoprazole 40 MG EC tablet  Commonly known as: PROTONIX   40 mg, Oral, Daily With Lunch, Afternoons      rOPINIRole 0.25 MG tablet  Commonly known as: REQUIP   0.25 mg, Oral, Nightly, Take 1 hour before bedtime      sertraline 50 MG tablet  Commonly known as: ZOLOFT   50 mg, Oral, Nightly      sildenafil 20 MG tablet  Commonly known as: REVATIO   20 mg, Oral, 2 Times Daily      traZODone 50 MG tablet  Commonly known as: DESYREL   25 mg, Oral, Nightly             Allergies   Allergen Reactions   • Baclofen Rash   • Codeine Nausea Only   • Ibuprofen Unknown (See Comments)     Patient doesn't know----Motin   • Methocarbamol Unknown (See Comments)     Patient doesn't know   • Naproxen Unknown (See Comments)     Pt. Doesn't know    • Tizanidine Hcl Hallucinations   • Tolmetin Dizziness     Pt. Doesn't know-- same as Tolectin         Discharge Disposition:  Skilled Nursing Facility (DC - External)    Diet:  Hospital:    2 g sodium 2 L fluid restricted diet    Discharge Activity: As per PT OT        CODE STATUS:  Code Status and Medical Interventions:   Ordered at: 07/25/21 1813     Code Status:    CPR     Medical Interventions (Level of Support Prior to Arrest):    Full         No future appointments.        Time spent on Discharge including face to face service: More than 35 minutes    This patient has been examined wearing appropriate Personal Protective Equipment and discussed with hospital infection control department. 07/27/21      Signature:   Electronically signed by Alexa Rodriguez MD, 07/27/21, 4:03 PM EDT.

## 2021-07-27 NOTE — CASE MANAGEMENT/SOCIAL WORK
Social Work Assessment  [facility]     Patient Name: Catalina Moreno  MRN: [id number]  Today's Date: 7/27/2021    Admit Date: 7/25/2021    Discharge Plan     Row Name 07/27/21 1528       Plan    Plan Comments  Message received that patient needed transportation assistance. Per nursing, patient reports son is unable to transport and no one else to provide. SW contacted patient's son (Jordan, [phone]) regarding transport and he reported that he was working at home in [place], [place], mainly concerned with time to get here and back. SW advised if transport through [employer] is unable to be arranged, then patient will need a ride - son stated they would work on it. Confirmed [employer] transport unable to find account or verify eligiblity. Attempted to contact son, left voicemail. Patient without income to transport, son not answering phone - provided cab voucher so discharge could be pursued. Nursing provided voucher, patient informed we will be unable to provide this at future hospitlizations. Verbalized understanding.     Met with patient in room wearing PPE: mask, face shield/goggles, gloves, gown.      Maintained distance greater than six feet and spent less than 15 minutes in the room.      KATY Jensen    Phone: [phone]  Cell: [phone]  Fax: [phone]  [email or web address]

## 2021-07-27 NOTE — CONSULTS
Group: [facility]         CONSULT NOTE    Patient Identification:  Catalina Moreno  81 y.o.  female  1940  [id number]            Requesting physician: Attending physician    Reason for Consultation: Pulmonary hypertension & sleep apnea       History of Present Illness:    81 year old female who presented to ED on 7/25/21 with atrial fibrillation. We were consulted for evaluation of OCTAVIO and pulmonary hypertension. We currently see the patient in our office and will be glad to re-evaluate her for OCTAVIO and review her medication for pulmonary hypertension.     Assessment:    Atrial fibrillation with rapid ventricular response (CMS/HCC)     Office note:  OCTAVIO  Severe pulmonary hypertension, RVSP 76 , severe tricuspid insufficiency mild to moderate mitral and aortic insufficiency normal LV systolic function RV dilatation noted, echocardiogram June 2020   Started on revatio    Recommendations:    Outpatient home sleep study follow up in office in 2 weeks at [facility]  Will review current medication and increase revatio if needed  OK to discharge from pulmonary standpoint    Review of Sytems:  Review of Systems   Constitutional: Positive for activity change and fatigue.   Respiratory: Negative for cough and shortness of breath.    Cardiovascular: Positive for leg swelling.       Past Medical History:  Past Medical History:   Diagnosis Date    Anxiety     Arthritis     Atrial fibrillation (CMS/HCC)     paroxysmal    Broken shoulder     left-- due to fall 11-7-19 was at [facility]    Buttock pain     rt side    DDD (degenerative disc disease), lumbar     Depression     Edema     9/2020 foot    GERD (gastroesophageal reflux disease)     H/O fall     Hip pain     rt    Hypertension     Hypothyroidism     Insomnia     Leg pain     rt    Low back pain     Neuropathy     PONV (postoperative nausea and vomiting)     Pulmonary hypertension (CMS/HCC)     Radiculopathy     Restless legs     Spondylolisthesis      Urinary incontinence        Past Surgical History:  Past Surgical History:   Procedure Laterality Date    BACK SURGERY  07/19/2018    PDC &  PSF L3-L5 insitu    CARDIAC CATHETERIZATION N/A 4/2/2021    Procedure: Cardiac Catheterization/Vascular Study;  Surgeon: Barrett Evans MD;  Location: [facility] CATH INVASIVE LOCATION;  Service: Cardiovascular;  Laterality: N/A;    CHOLECYSTECTOMY      COLONOSCOPY      HYSTERECTOMY      ROTATOR CUFF REPAIR Left         Home Meds:  Medications Prior to Admission   Medication Sig Dispense Refill Last Dose    apixaban (ELIQUIS) 5 MG tablet tablet Take 1 tablet by mouth Every 12 (Twelve) Hours. 180 tablet 3 7/24/2021 at Unknown time    famotidine (PEPCID) 20 MG tablet Take 20 mg by mouth Daily.  0 7/24/2021 at Unknown time    furosemide (LASIX) 40 MG tablet Take 40 mg by mouth 2 (Two) Times a Day. Uses for leg swelling   7/24/2021 at Unknown time    HYDROcodone-acetaminophen (NORCO) 7.5-325 MG per tablet Take 1 tablet by mouth 4 (Four) Times a Day As Needed for Severe Pain . DNF before 6/29/2021 120 tablet 0     levothyroxine (SYNTHROID, LEVOTHROID) 75 MCG tablet Take 75 mcg by mouth Daily. Take preop  0 Past Week at Unknown time    lidocaine (LIDODERM) 5 % Place 1 patch on the skin as directed by provider Daily As Needed for Mild Pain . Remove & Discard patch within 12 hours or as directed by MD       metoprolol succinate XL (TOPROL-XL) 50 MG 24 hr tablet TAKE 1 TABLET BY MOUTH ONCE DAILY 90 tablet 3 7/24/2021 at Unknown time    pantoprazole (PROTONIX) 40 MG EC tablet Take 40 mg by mouth Daily With Lunch. Afternoons  0 7/24/2021 at Unknown time    rOPINIRole (REQUIP) 0.25 MG tablet Take 1 tablet by mouth Every Night. Take 1 hour before bedtime 30 tablet 5 7/24/2021 at Unknown time    sildenafil (REVATIO) 20 MG tablet Take 20 mg by mouth 2 (Two) Times a Day.   7/24/2021 at Unknown time    traZODone (DESYREL) 50 MG tablet Take 25 mg by mouth Every Night.   7/24/2021 at  "Unknown time    sertraline (ZOLOFT) 50 MG tablet Take 50 mg by mouth Every Night.          Allergies:  Allergies   Allergen Reactions    Baclofen Rash    Codeine Nausea Only    Ibuprofen Unknown (See Comments)     Patient doesn't know----Motin    Methocarbamol Unknown (See Comments)     Patient doesn't know    Naproxen Unknown (See Comments)     Pt. Doesn't know     Tizanidine Hcl Hallucinations    Tolmetin Dizziness     Pt. Doesn't know-- same as Tolectin       Social History:   Social History     Socioeconomic History    Marital status:      Spouse name: Not on file    Number of children: Not on file    Years of education: Not on file    Highest education level: Not on file   Tobacco Use    Smoking status: Never Smoker    Smokeless tobacco: Never Used   Vaping Use    Vaping Use: Never used   Substance and Sexual Activity    Alcohol use: No    Drug use: No    Sexual activity: Defer       Family History:  Family History   Problem Relation Age of Onset    Cancer Father     Diabetes Son     Cancer Paternal Aunt     Heart disease Paternal Aunt     Cancer Paternal Uncle        Physical Exam:  /60   Pulse 93   Temp 98.6 °F (37 °C) (Oral)   Resp 20   Ht 160 cm (63\")   Wt 79.9 kg (176 lb 2.4 oz)   SpO2 100%   BMI 31.20 kg/m²  Body mass index is 31.2 kg/m². 100% 79.9 kg (176 lb 2.4 oz)  Physical Exam  Vitals reviewed.   Pulmonary:      Effort: Pulmonary effort is normal.      Breath sounds: Normal breath sounds.   Skin:     General: Skin is warm and dry.   Neurological:      Mental Status: She is alert and oriented to person, place, and time.         LABS:  Lab Results   Component Value Date    CALCIUM 8.9 07/27/2021    PHOS 3.1 01/14/2019     Results from last 7 days   Lab Units 07/27/21  0522 07/27/21  0242 07/26/21  2205 07/26/21  0300 07/26/21  0300 07/25/21  1432 07/25/21  1432 07/25/21  1323   MAGNESIUM mg/dL  --   --   --   --   --   --  1.9  --    SODIUM mmol/L 138  --   --   --  137  --  141  -- "    POTASSIUM mmol/L 3.9  --  4.8  --  3.1*   < > 3.0*  --    CHLORIDE mmol/L 99  --   --   --  97*  --  99  --    CO2 mmol/L 30.0*  --   --   --  26.0  --  26.0  --    BUN mg/dL 18  --   --   --  16  --  17  --    CREATININE mg/dL 1.19*  --   --   --  0.92  --  0.94  --    GLUCOSE mg/dL 135*  --   --    < > 106*   < > 120*  --    CALCIUM mg/dL 8.9  --   --   --  9.2  --  9.4  --    WBC 10*3/mm3  --  8.10  --   --  9.10  --   --  8.70   HEMOGLOBIN g/dL  --  11.7*  --   --  12.2  --   --  14.0   PLATELETS 10*3/mm3  --  312  --   --  341  --   --  365   ALT (SGPT) U/L 10  --   --   --   --   --  13  --    AST (SGOT) U/L 17  --   --   --   --   --  22  --    PROBNP pg/mL  --   --   --   --   --   --   --  2,469.0*    < > = values in this interval not displayed.     Lab Results   Component Value Date    CKTOTAL 135 01/03/2020    TROPONINI <0.03 06/04/2019    TROPONINT <0.010 07/25/2021     Results from last 7 days   Lab Units 07/25/21  1432   TROPONIN T ng/mL <0.010                     Results from last 7 days   Lab Units 07/25/21  1323   INR  1.08         Lab Results   Component Value Date    TSH 0.910 11/02/2020     Estimated Creatinine Clearance: 37.7 mL/min (A) (by C-G formula based on SCr of 1.19 mg/dL (H)).  Results from last 7 days   Lab Units 07/25/21  1420   NITRITE UA  Negative        Imaging:  Imaging Results (Last 24 Hours)       ** No results found for the last 24 hours. **              Current Meds:   SCHEDULE  apixaban, 5 mg, Oral, Q12H  furosemide, 40 mg, Oral, BID  gabapentin, 600 mg, Oral, BID  levothyroxine, 75 mcg, Oral, Daily  lidocaine, 1 patch, Transdermal, Q24H  metoclopramide, 10 mg, Oral, TID AC  metoprolol succinate XL, 50 mg, Oral, Daily  pantoprazole, 40 mg, Oral, BID AC  psyllium, 1 packet, Oral, Daily  rOPINIRole, 0.25 mg, Oral, Nightly  sertraline, 50 mg, Oral, Nightly  sildenafil, 20 mg, Oral, BID  sodium chloride, 10 mL, Intravenous, Q12H  traZODone, 25 mg, Oral,  Nightly      Infusions  dilTIAZem, 5-15 mg/hr, Last Rate: Stopped (07/26/21 0830)      PRNs    acetaminophen **OR** acetaminophen **OR** acetaminophen    HYDROcodone-acetaminophen    lidocaine    ondansetron    potassium chloride **OR** potassium chloride **OR** potassium chloride    [COMPLETED] Insert peripheral IV **AND** sodium chloride    sodium chloride        I reviewed the patient's new clinical results.    Electronically signed by KALYANI Mccormack, 07/27/21 at 13:49 EDT.     I personally have examined  and interviewed the patient . I have reviewed the history, data, problems, assessment and plan with our NP and agree with it.    Chantel Arroyo MD

## 2021-07-27 NOTE — PLAN OF CARE
Goal Outcome Evaluation:  Patient transferred to [facility]. Patient waiting on precert for [facility]. Pt's first choice depending on price is [facility]. HR currently controlled in the 70-80s. HR jumps to 100-115 getting up to the bsc. NP for Dr. Alcantara stated they would start digoxin. GI stated to follow up with Dr. Ortiz or [facility] in 2-4 weeks.     Problem: Adult Inpatient Plan of Care  Goal: Plan of Care Review  Outcome: Ongoing, Progressing  Goal: Patient-Specific Goal (Individualized)  Outcome: Ongoing, Progressing  Goal: Absence of Hospital-Acquired Illness or Injury  Outcome: Ongoing, Progressing  Intervention: Identify and Manage Fall Risk  Recent Flowsheet Documentation  Taken 7/27/2021 1200 by Rosio Anna RN  Safety Promotion/Fall Prevention:   activity supervised   clutter free environment maintained   assistive device/personal items within reach   nonskid shoes/slippers when out of bed   safety round/check completed  Taken 7/27/2021 1000 by Rosio Anna RN  Safety Promotion/Fall Prevention:   activity supervised   assistive device/personal items within reach   clutter free environment maintained   nonskid shoes/slippers when out of bed   safety round/check completed  Taken 7/27/2021 0800 by Rosio Anna RN  Safety Promotion/Fall Prevention:   activity supervised   clutter free environment maintained   assistive device/personal items within reach   nonskid shoes/slippers when out of bed   safety round/check completed  Intervention: Prevent Infection  Recent Flowsheet Documentation  Taken 7/27/2021 1200 by Rosio Anna RN  Infection Prevention:   hand hygiene promoted   personal protective equipment utilized  Taken 7/27/2021 1000 by Rosio Anna RN  Infection Prevention:   hand hygiene promoted   personal protective equipment utilized  Taken 7/27/2021 0800 by Rosio Anna RN  Infection Prevention:   hand hygiene promoted   personal protective equipment  utilized  Goal: Optimal Comfort and Wellbeing  Outcome: Ongoing, Progressing  Goal: Readiness for Transition of Care  Outcome: Ongoing, Progressing

## 2021-07-28 NOTE — CASE MANAGEMENT/SOCIAL WORK
Discharge Planning Assessment  [facility]     Patient Name: Catalina Moreno  MRN: [id number]  Today's Date: 7/28/2021    Admit Date: 7/25/2021          Plan    Final Discharge Disposition Code  03 - skilled nursing facility (SNF)    Final Note  to [facility]       Carol naegele rn  Case management  Office number [phone]  Cell phone [phone]

## 2021-08-09 ENCOUNTER — TELEPHONE (OUTPATIENT)
Dept: PAIN MEDICINE | Facility: CLINIC | Age: 81
End: 2021-08-09

## 2021-08-09 DIAGNOSIS — G89.4 CHRONIC PAIN SYNDROME: ICD-10-CM

## 2021-08-09 RX ORDER — HYDROCODONE BITARTRATE AND ACETAMINOPHEN 7.5; 325 MG/1; MG/1
1 TABLET ORAL 4 TIMES DAILY PRN
Qty: 120 TABLET | Refills: 0 | Status: SHIPPED | OUTPATIENT
Start: 2021-08-09 | End: 2021-08-10 | Stop reason: SDUPTHER

## 2021-08-09 NOTE — TELEPHONE ENCOUNTER
Received message from patient stating that she just recently got out of the hospital and is needing an rx for Hydrocodone. I advised patient that she will also need to schedule a follow-up appt and she stated that she will once she talks to her son about bringing her in.

## 2021-08-10 ENCOUNTER — TELEPHONE (OUTPATIENT)
Dept: PAIN MEDICINE | Facility: CLINIC | Age: 81
End: 2021-08-10

## 2021-08-10 DIAGNOSIS — G89.4 CHRONIC PAIN SYNDROME: ICD-10-CM

## 2021-08-10 RX ORDER — ROPINIROLE 0.25 MG/1
0.25 TABLET, FILM COATED ORAL NIGHTLY
Qty: 30 TABLET | Refills: 5 | Status: SHIPPED | OUTPATIENT
Start: 2021-08-10 | End: 2022-10-04 | Stop reason: SDUPTHER

## 2021-08-10 RX ORDER — ROPINIROLE 0.25 MG/1
TABLET, FILM COATED ORAL
Qty: 30 TABLET | Refills: 5 | Status: SHIPPED | OUTPATIENT
Start: 2021-08-10 | End: 2021-08-10 | Stop reason: SDUPTHER

## 2021-08-10 RX ORDER — HYDROCODONE BITARTRATE AND ACETAMINOPHEN 7.5; 325 MG/1; MG/1
1 TABLET ORAL 4 TIMES DAILY PRN
Qty: 120 TABLET | Refills: 0 | Status: SHIPPED | OUTPATIENT
Start: 2021-08-10 | End: 2021-09-02 | Stop reason: SDUPTHER

## 2021-08-10 NOTE — TELEPHONE ENCOUNTER
Provider: DR LEMUS    Caller: RADHA CARRERA    Relationship to Patient: SELF    Phone Number: [phone]    Reason for Call: PT CALLED IN A REFILL REQUEST FOR HER HYDROCODONE, WOULD LIKE A CALL BACK WHEN THE REFILL HAS BEEN SENT TO THE [facility] IN [place].

## 2021-08-10 NOTE — TELEPHONE ENCOUNTER
Patient called back, is now requesting that her rx be sent to the [facility] in [place] instead of the [facility].

## 2021-08-11 ENCOUNTER — TELEPHONE (OUTPATIENT)
Dept: PAIN MEDICINE | Facility: HOSPITAL | Age: 81
End: 2021-08-11

## 2021-08-11 RX ORDER — CYCLOBENZAPRINE HCL 5 MG
5 TABLET ORAL 2 TIMES DAILY PRN
Qty: 60 TABLET | Refills: 0 | Status: SHIPPED | OUTPATIENT
Start: 2021-08-11 | End: 2022-01-20 | Stop reason: SDUPTHER

## 2021-08-11 NOTE — TELEPHONE ENCOUNTER
Pt notified that rx was sent to dif pharmacy. Pt is requesting something be sent in for muscle spasms.

## 2021-09-02 ENCOUNTER — OFFICE VISIT (OUTPATIENT)
Dept: PAIN MEDICINE | Facility: CLINIC | Age: 81
End: 2021-09-02

## 2021-09-02 VITALS
RESPIRATION RATE: 16 BRPM | HEART RATE: 80 BPM | BODY MASS INDEX: 31.18 KG/M2 | DIASTOLIC BLOOD PRESSURE: 81 MMHG | WEIGHT: 176 LBS | HEIGHT: 63 IN | OXYGEN SATURATION: 97 % | SYSTOLIC BLOOD PRESSURE: 143 MMHG

## 2021-09-02 DIAGNOSIS — G89.4 CHRONIC PAIN SYNDROME: Primary | ICD-10-CM

## 2021-09-02 DIAGNOSIS — Z79.899 HIGH RISK MEDICATION USE: ICD-10-CM

## 2021-09-02 DIAGNOSIS — M96.1 POSTLAMINECTOMY SYNDROME OF LUMBAR REGION: ICD-10-CM

## 2021-09-02 DIAGNOSIS — M48.062 LUMBAR STENOSIS WITH NEUROGENIC CLAUDICATION: ICD-10-CM

## 2021-09-02 DIAGNOSIS — G25.81 RESTLESS LEG: ICD-10-CM

## 2021-09-02 PROCEDURE — 99214 OFFICE O/P EST MOD 30 MIN: CPT | Performed by: ANESTHESIOLOGY

## 2021-09-02 RX ORDER — HYDROCODONE BITARTRATE AND ACETAMINOPHEN 7.5; 325 MG/1; MG/1
1 TABLET ORAL 4 TIMES DAILY PRN
Qty: 120 TABLET | Refills: 0 | Status: SHIPPED | OUTPATIENT
Start: 2021-10-12 | End: 2021-11-29 | Stop reason: SDUPTHER

## 2021-09-02 NOTE — PROGRESS NOTES
CC back pain, jonah leg pain  81-year-old female with HTN, depression, chronic polyarthralgia from osteoarthritis, chronic back pain S/p L4-5 laminectomy with bony fusion 2018/Noelle., here for follow-up.  Continued chronic back pain bilateral lower extremity radicular pain and neurogenic claudication symptoms.  Denies saddle anesthesia, bladder or bowel incontinence.  Chronic polyarthralgia / left knee pain/ bilateral feet neuropathic pain    Still  limited in ADL due to bilateral lower extremity pain and weakness.  Ambulating with walker    Utilizes gabapentin, hydrocodone with good relief functional benefit without side effects.      L-Spine x-ray 2019 Degenerative changes of the lumbar spine with stable 6 mm anterolisthesis L4 upon L5  L-spine MRI 2019: postoperative changes are noted at L4 and L5 with laminectomies and posterior bony fusion. Degenerative changes are seen at multiple levels, greatest at L3-4, with moderate to severe spinal canal narrowing and moderate bilateral foraminal narrowing, greater on the left    C-spine CT 2017:  Degenerative changes spondylosis with central stenosis at C5-C6 and left foraminal stenosis C4/5    Pain Assessment   Location of Pain: Lower Back, R Hip, L Hip, R Leg, L Leg  Description of Pain: Dull/Aching, Throbbing, Stabbing  Previous Pain Rating : 8  Current Pain Ratin  Aggravating Factors: Activity  Alleviating Factors: Rest, Medication  Previous Treatments: Nerve Blocks/Injections, Epidural Steroids, Narcotic Pain Medication, Physical Therapy  Previous Diagnostic Studies: X-Ray, MRI    PEG Assessment   What number best describes your pain on average in the past week?10  What number best describes how, during the past week, pain has interfered with your enjoyment of life?8  What number best describes how, during the past week, pain has interfered with your general activity? 10     has a past medical history of Anxiety, Arthritis, Atrial fibrillation (CMS/HCC), Broken  "shoulder, Buttock pain, DDD (degenerative disc disease), lumbar, Depression, Edema, GERD (gastroesophageal reflux disease), H/O fall, Hip pain, Hypertension, Hypothyroidism, Insomnia, Leg pain, Low back pain, Neuropathy, PONV (postoperative nausea and vomiting), Pulmonary hypertension (CMS/HCC), Radiculopathy, Restless legs, Spondylolisthesis, and Urinary incontinence.     has a past surgical history that includes Hysterectomy; Rotator cuff repair (Left); Cholecystectomy; Colonoscopy; Back surgery (07/19/2018); and Cardiac catheterization (N/A, 4/2/2021).  .  Social History     Tobacco Use   • Smoking status: Never Smoker   • Smokeless tobacco: Never Used   Substance Use Topics   • Alcohol use: No       Review of Systems        See HPI      Vitals:    09/02/21 1105   BP: 143/81   Pulse: 80   Resp: 16   SpO2: 97%   Weight: 79.8 kg (176 lb)   Height: 160 cm (63\")     Physical Exam  Vitals reviewed.   Constitutional:       General: She is not in acute distress.     Comments: walker   Pulmonary:      Effort: Pulmonary effort is normal.   Musculoskeletal:      Lumbar back: Tenderness present. Decreased range of motion. Positive right straight leg raise test and positive left straight leg raise test.     PHQ9 on chart                    opioid risk tool high risk        Assessment/Plan  Diagnoses and all orders for this visit:    1. Chronic pain syndrome (Primary)  -     HYDROcodone-acetaminophen (NORCO) 7.5-325 MG per tablet; Take 1 tablet by mouth 4 (Four) Times a Day As Needed for Severe Pain . DNF before 10/12/2021  Dispense: 120 tablet; Refill: 0    2. Postlaminectomy syndrome of lumbar region    3. Lumbar stenosis with neurogenic claudication    4. Restless leg    5. High risk medication use  -     Urine Drug Screen - Urine, Clean Catch; Future    Summary:   81-year-old female with HTN, depression, chronic polyarthralgia from osteoarthritis, chronic back pain from DDD/ spondylosis/stenosis, S/p L4/5 laminectomy with " bony fusion 7/2018/ Majd seen in  follow-up.     Chronic lower lumbar /coccyx pain and RLE radicular pain, continued bilateral lower extremity weakness.    Consider repeat caudal/ LESI as needed    Continues to derive functional benefit from hydrocodone.  Had 2 prescriptions refilled last month due to switching pharmacy when the first 1 did not have supply.    Will save the second prescription for this month.  Next refill in October.    Continue hydrocodone 7.5/325 4 times daily as needed for severe pain.  UDS and Inspect reviewed.   Discussed risk of tolerance, dependence, respiratory depression, coma and death associated with use of oral opioids for treatment of chronic nonmalignant pain.      Cont Lidocaine patch for worsening myofascial and neuropathic pain.   Cont requip for RLS at night    RTC  in  2 mo.

## 2021-09-07 RX ORDER — METOPROLOL SUCCINATE 50 MG/1
50 TABLET, EXTENDED RELEASE ORAL DAILY
Qty: 30 TABLET | Refills: 0 | Status: SHIPPED | OUTPATIENT
Start: 2021-09-07 | End: 2021-09-09

## 2021-09-09 RX ORDER — METOPROLOL SUCCINATE 50 MG/1
TABLET, EXTENDED RELEASE ORAL
Qty: 90 TABLET | Refills: 0 | Status: SHIPPED | OUTPATIENT
Start: 2021-09-09 | End: 2021-12-02 | Stop reason: SDUPTHER

## 2021-09-09 NOTE — TELEPHONE ENCOUNTER
Rx Refill Note  Requested Prescriptions     Pending Prescriptions Disp Refills   • metoprolol succinate XL (TOPROL-XL) 50 MG 24 hr tablet [Pharmacy Med Name: METOPROLOL ER SUCCINATE 50MG TABS] 90 tablet      Sig: TAKE 1 TABLET BY MOUTH DAILY. CALL MEDICAL DOCTOR OFFICE TO MAKE AND KEEP APPOINTMENT FOR FURTHER REFILLS      Last office visit with prescribing clinician: 10/1/2020      Next office visit with prescribing clinician: f/u in [place]     Comprehensive Metabolic Panel (07/27/2021 05:22)  CBC & Differential (07/27/2021 02:42)         Naa Sanchez MA  09/09/21, 09:19 EDT

## 2021-11-02 RX ORDER — GABAPENTIN 600 MG/1
TABLET ORAL
Qty: 120 TABLET | Refills: 11 | Status: SHIPPED | OUTPATIENT
Start: 2021-11-02 | End: 2021-12-02

## 2021-11-13 ENCOUNTER — APPOINTMENT (OUTPATIENT)
Dept: CT IMAGING | Facility: HOSPITAL | Age: 81
End: 2021-11-13

## 2021-11-13 ENCOUNTER — APPOINTMENT (OUTPATIENT)
Dept: GENERAL RADIOLOGY | Facility: HOSPITAL | Age: 81
End: 2021-11-13

## 2021-11-13 ENCOUNTER — HOSPITAL ENCOUNTER (OUTPATIENT)
Facility: HOSPITAL | Age: 81
Setting detail: OBSERVATION
Discharge: HOME HEALTH | End: 2021-11-16
Attending: HOSPITALIST | Admitting: INTERNAL MEDICINE

## 2021-11-13 DIAGNOSIS — M79.7 PRIMARY FIBROMYALGIA SYNDROME: Chronic | ICD-10-CM

## 2021-11-13 DIAGNOSIS — R29.90 STROKE-LIKE SYMPTOMS: Primary | ICD-10-CM

## 2021-11-13 DIAGNOSIS — I50.33 ACUTE ON CHRONIC DIASTOLIC (CONGESTIVE) HEART FAILURE (HCC): ICD-10-CM

## 2021-11-13 DIAGNOSIS — I48.91 ATRIAL FIBRILLATION, UNSPECIFIED TYPE (HCC): Chronic | ICD-10-CM

## 2021-11-13 DIAGNOSIS — M25.50 POLYARTHRALGIA: ICD-10-CM

## 2021-11-13 DIAGNOSIS — R53.1 LEFT-SIDED WEAKNESS: ICD-10-CM

## 2021-11-13 PROBLEM — M51.36 OTHER INTERVERTEBRAL DISC DEGENERATION, LUMBAR REGION: Status: ACTIVE | Noted: 2021-07-27

## 2021-11-13 PROBLEM — Z91.81 HISTORY OF FALLING: Status: ACTIVE | Noted: 2021-07-27

## 2021-11-13 PROBLEM — G62.9 POLYNEUROPATHY, UNSPECIFIED: Status: ACTIVE | Noted: 2021-07-27

## 2021-11-13 PROBLEM — G89.4 CHRONIC PAIN DISORDER: Status: ACTIVE | Noted: 2021-07-27

## 2021-11-13 PROBLEM — E03.9 HYPOTHYROIDISM: Chronic | Status: ACTIVE | Noted: 2017-05-04

## 2021-11-13 PROBLEM — I50.32 CHRONIC DIASTOLIC CHF (CONGESTIVE HEART FAILURE): Chronic | Status: ACTIVE | Noted: 2021-11-13

## 2021-11-13 PROBLEM — R13.19 OTHER DYSPHAGIA: Status: ACTIVE | Noted: 2021-07-27

## 2021-11-13 PROBLEM — F32.9 MAJOR DEPRESSIVE DISORDER, SINGLE EPISODE, UNSPECIFIED: Status: ACTIVE | Noted: 2021-07-27

## 2021-11-13 PROBLEM — M51.369 OTHER INTERVERTEBRAL DISC DEGENERATION, LUMBAR REGION: Status: ACTIVE | Noted: 2021-07-27

## 2021-11-13 PROBLEM — F32.A DEPRESSIVE DISORDER: Chronic | Status: ACTIVE | Noted: 2017-05-04

## 2021-11-13 PROBLEM — R11.10 VOMITING, UNSPECIFIED: Status: ACTIVE | Noted: 2021-07-27

## 2021-11-13 PROBLEM — N18.2 STAGE 2 CHRONIC KIDNEY DISEASE: Chronic | Status: ACTIVE | Noted: 2020-10-08

## 2021-11-13 PROBLEM — M43.16 SPONDYLOLISTHESIS, LUMBAR REGION: Status: ACTIVE | Noted: 2021-07-27

## 2021-11-13 PROBLEM — K21.9 GASTRO-ESOPHAGEAL REFLUX DISEASE WITHOUT ESOPHAGITIS: Status: ACTIVE | Noted: 2021-07-27

## 2021-11-13 PROBLEM — I10 ESSENTIAL (PRIMARY) HYPERTENSION: Status: ACTIVE | Noted: 2021-07-27

## 2021-11-13 PROBLEM — M79.2 NEUROPATHIC PAIN: Chronic | Status: ACTIVE | Noted: 2017-05-18

## 2021-11-13 PROBLEM — E03.9 HYPOTHYROIDISM, UNSPECIFIED: Status: ACTIVE | Noted: 2021-07-27

## 2021-11-13 PROBLEM — G25.81 RESTLESS LEGS SYNDROME: Chronic | Status: ACTIVE | Noted: 2021-07-27

## 2021-11-13 PROBLEM — M54.50 CHRONIC LOW BACK PAIN: Chronic | Status: ACTIVE | Noted: 2019-12-07

## 2021-11-13 PROBLEM — I10 ESSENTIAL (PRIMARY) HYPERTENSION: Chronic | Status: ACTIVE | Noted: 2021-07-27

## 2021-11-13 PROBLEM — G47.00 INSOMNIA, UNSPECIFIED: Status: ACTIVE | Noted: 2021-07-27

## 2021-11-13 PROBLEM — R60.9 EDEMA, UNSPECIFIED: Status: ACTIVE | Noted: 2021-07-27

## 2021-11-13 PROBLEM — Z74.1 NEED FOR ASSISTANCE WITH PERSONAL CARE: Status: ACTIVE | Noted: 2021-07-27

## 2021-11-13 PROBLEM — I27.20 PULMONARY HYPERTENSION, UNSPECIFIED: Status: ACTIVE | Noted: 2021-07-27

## 2021-11-13 PROBLEM — F41.1 GENERALIZED ANXIETY DISORDER: Chronic | Status: ACTIVE | Noted: 2021-07-27

## 2021-11-13 PROBLEM — K21.9 GASTROESOPHAGEAL REFLUX DISEASE: Chronic | Status: ACTIVE | Noted: 2017-05-04

## 2021-11-13 PROBLEM — R10.9 UNSPECIFIED ABDOMINAL PAIN: Status: ACTIVE | Noted: 2021-07-27

## 2021-11-13 PROBLEM — I27.20 PULMONARY HYPERTENSION (HCC): Chronic | Status: ACTIVE | Noted: 2020-08-06

## 2021-11-13 PROBLEM — I48.0 PAROXYSMAL ATRIAL FIBRILLATION (HCC): Status: ACTIVE | Noted: 2021-07-27

## 2021-11-13 PROBLEM — R41.81 AGE-RELATED COGNITIVE DECLINE: Status: ACTIVE | Noted: 2021-07-27

## 2021-11-13 PROBLEM — I48.0 PAROXYSMAL ATRIAL FIBRILLATION (HCC): Chronic | Status: ACTIVE | Noted: 2021-07-27

## 2021-11-13 PROBLEM — G25.81 RESTLESS LEGS SYNDROME: Status: ACTIVE | Noted: 2021-07-27

## 2021-11-13 PROBLEM — G89.29 CHRONIC LOW BACK PAIN: Chronic | Status: ACTIVE | Noted: 2019-12-07

## 2021-11-13 PROBLEM — F41.1 GENERALIZED ANXIETY DISORDER: Status: ACTIVE | Noted: 2021-07-27

## 2021-11-13 PROBLEM — R26.81 UNSTEADINESS ON FEET: Status: ACTIVE | Noted: 2021-07-27

## 2021-11-13 LAB
ABO GROUP BLD: NORMAL
ALBUMIN SERPL-MCNC: 4.4 G/DL (ref 3.5–5.2)
ALBUMIN/GLOB SERPL: 1.7 G/DL
ALP SERPL-CCNC: 69 U/L (ref 39–117)
ALT SERPL W P-5'-P-CCNC: 12 U/L (ref 1–33)
ANION GAP SERPL CALCULATED.3IONS-SCNC: 13 MMOL/L (ref 5–15)
AST SERPL-CCNC: 15 U/L (ref 1–32)
BASOPHILS # BLD AUTO: 0.1 10*3/MM3 (ref 0–0.2)
BASOPHILS NFR BLD AUTO: 1.3 % (ref 0–1.5)
BILIRUB SERPL-MCNC: 0.5 MG/DL (ref 0–1.2)
BILIRUB UR QL STRIP: NEGATIVE
BLD GP AB SCN SERPL QL: NEGATIVE
BUN SERPL-MCNC: 15 MG/DL (ref 8–23)
BUN/CREAT SERPL: 20.8 (ref 7–25)
CALCIUM SPEC-SCNC: 9 MG/DL (ref 8.6–10.5)
CHLORIDE SERPL-SCNC: 108 MMOL/L (ref 98–107)
CHOLEST SERPL-MCNC: 124 MG/DL (ref 0–200)
CLARITY UR: CLEAR
CO2 SERPL-SCNC: 23 MMOL/L (ref 22–29)
COLOR UR: YELLOW
CREAT SERPL-MCNC: 0.72 MG/DL (ref 0.57–1)
D-LACTATE SERPL-SCNC: 1.4 MMOL/L (ref 0.5–2)
DEPRECATED RDW RBC AUTO: 53.8 FL (ref 37–54)
DIGOXIN SERPL-MCNC: <0.3 NG/ML (ref 0.6–1.2)
EOSINOPHIL # BLD AUTO: 0.2 10*3/MM3 (ref 0–0.4)
EOSINOPHIL NFR BLD AUTO: 2.7 % (ref 0.3–6.2)
ERYTHROCYTE [DISTWIDTH] IN BLOOD BY AUTOMATED COUNT: 17.7 % (ref 12.3–15.4)
GFR SERPL CREATININE-BSD FRML MDRD: 78 ML/MIN/1.73
GLOBULIN UR ELPH-MCNC: 2.6 GM/DL
GLUCOSE BLDC GLUCOMTR-MCNC: 101 MG/DL (ref 70–105)
GLUCOSE BLDC GLUCOMTR-MCNC: 129 MG/DL (ref 70–105)
GLUCOSE SERPL-MCNC: 121 MG/DL (ref 65–99)
GLUCOSE UR STRIP-MCNC: NEGATIVE MG/DL
HCT VFR BLD AUTO: 31.9 % (ref 34–46.6)
HDLC SERPL-MCNC: 48 MG/DL (ref 40–60)
HGB BLD-MCNC: 10.6 G/DL (ref 12–15.9)
HGB UR QL STRIP.AUTO: NEGATIVE
HOLD SPECIMEN: NORMAL
INR PPP: 0.98 (ref 0.93–1.1)
KETONES UR QL STRIP: NEGATIVE
LDLC SERPL CALC-MCNC: 57 MG/DL (ref 0–100)
LDLC/HDLC SERPL: 1.17 {RATIO}
LEUKOCYTE ESTERASE UR QL STRIP.AUTO: NEGATIVE
LYMPHOCYTES # BLD AUTO: 2.3 10*3/MM3 (ref 0.7–3.1)
LYMPHOCYTES NFR BLD AUTO: 36.8 % (ref 19.6–45.3)
MAGNESIUM SERPL-MCNC: 2.3 MG/DL (ref 1.6–2.4)
MCH RBC QN AUTO: 28.7 PG (ref 26.6–33)
MCHC RBC AUTO-ENTMCNC: 33.3 G/DL (ref 31.5–35.7)
MCV RBC AUTO: 86.4 FL (ref 79–97)
MONOCYTES # BLD AUTO: 0.6 10*3/MM3 (ref 0.1–0.9)
MONOCYTES NFR BLD AUTO: 10.5 % (ref 5–12)
NEUTROPHILS NFR BLD AUTO: 3 10*3/MM3 (ref 1.7–7)
NEUTROPHILS NFR BLD AUTO: 48.7 % (ref 42.7–76)
NITRITE UR QL STRIP: NEGATIVE
NRBC BLD AUTO-RTO: 0.1 /100 WBC (ref 0–0.2)
PH UR STRIP.AUTO: 7 [PH] (ref 5–8)
PLATELET # BLD AUTO: 259 10*3/MM3 (ref 140–450)
PMV BLD AUTO: 7.8 FL (ref 6–12)
POTASSIUM SERPL-SCNC: 4.5 MMOL/L (ref 3.5–5.2)
PROT SERPL-MCNC: 7 G/DL (ref 6–8.5)
PROT UR QL STRIP: NEGATIVE
PROTHROMBIN TIME: 10.9 SECONDS (ref 9.6–11.7)
RBC # BLD AUTO: 3.69 10*6/MM3 (ref 3.77–5.28)
RH BLD: NEGATIVE
SARS-COV-2 RNA PNL SPEC NAA+PROBE: NOT DETECTED
SODIUM SERPL-SCNC: 144 MMOL/L (ref 136–145)
SP GR UR STRIP: 1.01 (ref 1–1.03)
T&S EXPIRATION DATE: NORMAL
TRIGL SERPL-MCNC: 100 MG/DL (ref 0–150)
TROPONIN T SERPL-MCNC: <0.01 NG/ML (ref 0–0.03)
TSH SERPL DL<=0.05 MIU/L-ACNC: 4.52 UIU/ML (ref 0.27–4.2)
UROBILINOGEN UR QL STRIP: NORMAL
VLDLC SERPL-MCNC: 19 MG/DL (ref 5–40)
WBC # BLD AUTO: 6.1 10*3/MM3 (ref 3.4–10.8)

## 2021-11-13 PROCEDURE — 99219 PR INITIAL OBSERVATION CARE/DAY 50 MINUTES: CPT | Performed by: NURSE PRACTITIONER

## 2021-11-13 PROCEDURE — 25010000002 MORPHINE SULFATE (PF) 2 MG/ML SOLUTION: Performed by: PHYSICIAN ASSISTANT

## 2021-11-13 PROCEDURE — 84443 ASSAY THYROID STIM HORMONE: CPT | Performed by: PHYSICIAN ASSISTANT

## 2021-11-13 PROCEDURE — 80061 LIPID PANEL: CPT | Performed by: NURSE PRACTITIONER

## 2021-11-13 PROCEDURE — 87635 SARS-COV-2 COVID-19 AMP PRB: CPT | Performed by: PHYSICIAN ASSISTANT

## 2021-11-13 PROCEDURE — 83036 HEMOGLOBIN GLYCOSYLATED A1C: CPT | Performed by: NURSE PRACTITIONER

## 2021-11-13 PROCEDURE — 0 IOPAMIDOL PER 1 ML: Performed by: PHYSICIAN ASSISTANT

## 2021-11-13 PROCEDURE — 86900 BLOOD TYPING SEROLOGIC ABO: CPT | Performed by: PHYSICIAN ASSISTANT

## 2021-11-13 PROCEDURE — 71045 X-RAY EXAM CHEST 1 VIEW: CPT

## 2021-11-13 PROCEDURE — 81003 URINALYSIS AUTO W/O SCOPE: CPT | Performed by: PHYSICIAN ASSISTANT

## 2021-11-13 PROCEDURE — 84484 ASSAY OF TROPONIN QUANT: CPT | Performed by: PHYSICIAN ASSISTANT

## 2021-11-13 PROCEDURE — 36415 COLL VENOUS BLD VENIPUNCTURE: CPT | Performed by: PHYSICIAN ASSISTANT

## 2021-11-13 PROCEDURE — G0378 HOSPITAL OBSERVATION PER HR: HCPCS

## 2021-11-13 PROCEDURE — 96374 THER/PROPH/DIAG INJ IV PUSH: CPT

## 2021-11-13 PROCEDURE — 86901 BLOOD TYPING SEROLOGIC RH(D): CPT | Performed by: PHYSICIAN ASSISTANT

## 2021-11-13 PROCEDURE — 70496 CT ANGIOGRAPHY HEAD: CPT

## 2021-11-13 PROCEDURE — 86850 RBC ANTIBODY SCREEN: CPT | Performed by: PHYSICIAN ASSISTANT

## 2021-11-13 PROCEDURE — 93005 ELECTROCARDIOGRAM TRACING: CPT | Performed by: PHYSICIAN ASSISTANT

## 2021-11-13 PROCEDURE — 85025 COMPLETE CBC W/AUTO DIFF WBC: CPT | Performed by: PHYSICIAN ASSISTANT

## 2021-11-13 PROCEDURE — 82962 GLUCOSE BLOOD TEST: CPT

## 2021-11-13 PROCEDURE — 70450 CT HEAD/BRAIN W/O DYE: CPT

## 2021-11-13 PROCEDURE — 85610 PROTHROMBIN TIME: CPT | Performed by: PHYSICIAN ASSISTANT

## 2021-11-13 PROCEDURE — 87040 BLOOD CULTURE FOR BACTERIA: CPT | Performed by: PHYSICIAN ASSISTANT

## 2021-11-13 PROCEDURE — C9803 HOPD COVID-19 SPEC COLLECT: HCPCS

## 2021-11-13 PROCEDURE — 70498 CT ANGIOGRAPHY NECK: CPT

## 2021-11-13 PROCEDURE — 83735 ASSAY OF MAGNESIUM: CPT | Performed by: PHYSICIAN ASSISTANT

## 2021-11-13 PROCEDURE — 99285 EMERGENCY DEPT VISIT HI MDM: CPT

## 2021-11-13 PROCEDURE — 80053 COMPREHEN METABOLIC PANEL: CPT | Performed by: PHYSICIAN ASSISTANT

## 2021-11-13 PROCEDURE — 93005 ELECTROCARDIOGRAM TRACING: CPT | Performed by: HOSPITALIST

## 2021-11-13 PROCEDURE — 83605 ASSAY OF LACTIC ACID: CPT

## 2021-11-13 PROCEDURE — 80162 ASSAY OF DIGOXIN TOTAL: CPT | Performed by: PHYSICIAN ASSISTANT

## 2021-11-13 PROCEDURE — 93005 ELECTROCARDIOGRAM TRACING: CPT

## 2021-11-13 RX ORDER — MORPHINE SULFATE 2 MG/ML
2 INJECTION, SOLUTION INTRAMUSCULAR; INTRAVENOUS ONCE
Status: COMPLETED | OUTPATIENT
Start: 2021-11-13 | End: 2021-11-13

## 2021-11-13 RX ORDER — DILTIAZEM HYDROCHLORIDE 5 MG/ML
10 INJECTION INTRAVENOUS ONCE
Status: DISCONTINUED | OUTPATIENT
Start: 2021-11-13 | End: 2021-11-13

## 2021-11-13 RX ORDER — HYDROCODONE BITARTRATE AND ACETAMINOPHEN 5; 325 MG/1; MG/1
1 TABLET ORAL ONCE AS NEEDED
Status: COMPLETED | OUTPATIENT
Start: 2021-11-13 | End: 2021-11-13

## 2021-11-13 RX ORDER — CLOPIDOGREL BISULFATE 75 MG/1
75 TABLET ORAL DAILY
Status: DISCONTINUED | OUTPATIENT
Start: 2021-11-14 | End: 2021-11-14

## 2021-11-13 RX ORDER — HYDROCODONE BITARTRATE AND ACETAMINOPHEN 7.5; 325 MG/1; MG/1
1 TABLET ORAL ONCE
Status: DISCONTINUED | OUTPATIENT
Start: 2021-11-13 | End: 2021-11-13

## 2021-11-13 RX ORDER — ATORVASTATIN CALCIUM 40 MG/1
80 TABLET, FILM COATED ORAL NIGHTLY
Status: DISCONTINUED | OUTPATIENT
Start: 2021-11-13 | End: 2021-11-16 | Stop reason: HOSPADM

## 2021-11-13 RX ORDER — SODIUM CHLORIDE 0.9 % (FLUSH) 0.9 %
10 SYRINGE (ML) INJECTION AS NEEDED
Status: DISCONTINUED | OUTPATIENT
Start: 2021-11-13 | End: 2021-11-16 | Stop reason: HOSPADM

## 2021-11-13 RX ADMIN — HYDROCODONE BITARTRATE AND ACETAMINOPHEN 1 TABLET: 5; 325 TABLET ORAL at 21:40

## 2021-11-13 RX ADMIN — IOPAMIDOL 100 ML: 755 INJECTION, SOLUTION INTRAVENOUS at 20:21

## 2021-11-13 RX ADMIN — MORPHINE SULFATE 2 MG: 2 INJECTION, SOLUTION INTRAMUSCULAR; INTRAVENOUS at 21:42

## 2021-11-13 NOTE — ED NOTES
Pt states for last two days she has had left sides weakness. Pt reports pain in left side but is alert and oriented x 4.      Ita Henley RN  11/13/21 7337

## 2021-11-13 NOTE — ED PROVIDER NOTES
Subjective     Patient is an 81-year-old female comes in complaining of apparent intermittent confusion for the past 3 days.  Patient states that she has had trouble thinking of words to say is worsened over the last few days.  Patient also reports that she had increased fatigue over the last 3 days as well and what she believes is left-sided weakness that started last night.  Patient states that she states that this got worse throughout the day today.  Patient reports that she has never had a stroke in the past.  Patient states she has a history of paroxysmal atrial fibrillation takes Eliquis and follows with cardiology, Dr. Alcantara.  Patient reports some subjective chills last few days and reports chronic diarrhea but otherwise denies any bright red blood in her stool or black tarry stools, nausea, vomiting, chest pain, shortness of breath, cough, abdominal pain, palpitations or swelling in her legs bilaterally.  Patient denies any numbness or tingling in any extremities.  Patient states her left upper extremity and left lower extremity feel weak.  Patient also feels like the left side of her face is drooping as well.  Patient otherwise denies any dizziness, vertigo sensation, syncopal episode or feeling lightheaded.  Patient states she just had low energy last few days.        Review of Systems   Constitutional: Positive for chills and fatigue. Negative for diaphoresis and fever.   HENT: Negative for congestion, sore throat, tinnitus and trouble swallowing.    Eyes: Negative for photophobia, discharge and visual disturbance.   Respiratory: Negative for cough, shortness of breath and wheezing.    Cardiovascular: Negative for chest pain, palpitations and leg swelling.   Gastrointestinal: Negative for abdominal pain, blood in stool, diarrhea, nausea and vomiting.   Genitourinary: Negative for dysuria, flank pain and urgency.   Musculoskeletal: Negative for arthralgias and myalgias.   Skin: Negative for rash.    Neurological: Positive for facial asymmetry (left sided weakness/facial droop) and weakness (left sided weakness). Negative for dizziness, syncope, speech difficulty, light-headedness, numbness and headaches.   Psychiatric/Behavioral: Negative for confusion. The patient is nervous/anxious.        Past Medical History:   Diagnosis Date   • Anxiety    • Arthritis    • Atrial fibrillation (CMS/HCC)     paroxysmal   • Broken shoulder     left-- due to fall 11-7-19 was at [facility]   • Buttock pain     rt side   • DDD (degenerative disc disease), lumbar    • Depression    • Edema     9/2020 foot   • GERD (gastroesophageal reflux disease)    • H/O fall    • Hip pain     rt   • Hypertension    • Hypothyroidism    • Insomnia    • Leg pain     rt   • Low back pain    • Neuropathy    • PONV (postoperative nausea and vomiting)    • Pulmonary hypertension (CMS/HCC)    • Radiculopathy    • Restless legs    • Spondylolisthesis    • Urinary incontinence        Allergies   Allergen Reactions   • Baclofen Rash   • Codeine Nausea Only   • Ibuprofen Unknown (See Comments)     Patient doesn't know----Motin   • Methocarbamol Unknown (See Comments)     Patient doesn't know   • Naproxen Unknown (See Comments)     Pt. Doesn't know    • Tizanidine Hcl Hallucinations   • Tolmetin Dizziness     Pt. Doesn't know-- same as Tolectin       Past Surgical History:   Procedure Laterality Date   • BACK SURGERY  07/19/2018    PDC &  PSF L3-L5 insitu   • CARDIAC CATHETERIZATION N/A 4/2/2021    Procedure: Cardiac Catheterization/Vascular Study;  Surgeon: Barrett Evans MD;  Location: [facility] INVASIVE LOCATION;  Service: Cardiovascular;  Laterality: N/A;   • CHOLECYSTECTOMY     • COLONOSCOPY     • HYSTERECTOMY     • ROTATOR CUFF REPAIR Left        Family History   Problem Relation Age of Onset   • Cancer Father    • Diabetes Son    • Cancer Paternal Aunt    • Heart disease Paternal Aunt    • Cancer Paternal Uncle        Social History      Socioeconomic History   • Marital status:    Tobacco Use   • Smoking status: Never Smoker   • Smokeless tobacco: Never Used   Vaping Use   • Vaping Use: Never used   Substance and Sexual Activity   • Alcohol use: No   • Drug use: No   • Sexual activity: Defer           Objective   Physical Exam  Vitals and nursing note reviewed.   Constitutional:       General: She is not in acute distress.     Appearance: She is well-developed. She is not diaphoretic.   HENT:      Head: Normocephalic and atraumatic.      Nose: Nose normal.      Mouth/Throat:      Pharynx: No oropharyngeal exudate.   Eyes:      General: No visual field deficit.     Extraocular Movements: Extraocular movements intact.      Right eye: Normal extraocular motion and no nystagmus.      Left eye: Normal extraocular motion and no nystagmus.      Conjunctiva/sclera: Conjunctivae normal.      Pupils: Pupils are equal, round, and reactive to light. Pupils are equal.   Cardiovascular:      Rate and Rhythm: Normal rate and regular rhythm.      Heart sounds: Normal heart sounds.      Comments: S1, S2 audible.  Pulmonary:      Effort: Pulmonary effort is normal. No respiratory distress.      Breath sounds: Normal breath sounds. No wheezing, rhonchi or rales.      Comments: On room air.  Abdominal:      General: Bowel sounds are normal. There is no distension.      Palpations: Abdomen is soft.      Tenderness: There is no abdominal tenderness.   Musculoskeletal:         General: No tenderness or deformity. Normal range of motion.      Cervical back: Normal range of motion.   Skin:     General: Skin is warm.      Capillary Refill: Capillary refill takes less than 2 seconds.      Findings: No erythema or rash.   Neurological:      Mental Status: She is alert and oriented to person, place, and time. Mental status is at baseline.      GCS: GCS eye subscore is 4. GCS verbal subscore is 5. GCS motor subscore is 6.      Cranial Nerves: No cranial nerve  "deficit, dysarthria or facial asymmetry.      Sensory: No sensory deficit.      Motor: No weakness (patient has no appreciated weakness of upper or lower extremities bilaterally.).      Coordination: Romberg sign negative. Coordination normal.      Comments: NIH is zero.   Psychiatric:         Mood and Affect: Mood normal.         Behavior: Behavior normal.         Procedures           ED Course  ED Course as of 11/13/21 2148   Sat Nov 13, 2021   1919 Patient's heart rate jumped up when she went to the bathroom but prior to this her heart rate was controlled around 108 [RL]      ED Course User Index  [RL] Alex Munoz PA      /70   Pulse 120   Temp 98.1 °F (36.7 °C)   Resp 17   Ht 162.6 cm (64\")   Wt 79.4 kg (175 lb)   SpO2 95%   BMI 30.04 kg/m²   Labs Reviewed   DIGOXIN LEVEL - Abnormal; Notable for the following components:       Result Value    Digoxin <0.30 (*)     All other components within normal limits   COMPREHENSIVE METABOLIC PANEL - Abnormal; Notable for the following components:    Glucose 121 (*)     Chloride 108 (*)     All other components within normal limits    Narrative:     GFR Normal >60  Chronic Kidney Disease <60  Kidney Failure <15     CBC WITH AUTO DIFFERENTIAL - Abnormal; Notable for the following components:    RBC 3.69 (*)     Hemoglobin 10.6 (*)     Hematocrit 31.9 (*)     RDW 17.7 (*)     All other components within normal limits   TSH - Abnormal; Notable for the following components:    TSH 4.520 (*)     All other components within normal limits   POCT GLUCOSE FINGERSTICK - Abnormal; Notable for the following components:    Glucose 129 (*)     All other components within normal limits   COVID-19,CEPHEID/CARLI/BDMAX,COR/KEVEN/PAD/ISAÍAS IN-HOUSE,NP SWAB IN TRANSPORT MEDIA 3-4 HR TAT, RT-PCR - Normal    Narrative:     Fact sheet for providers: https://www.fda.gov/media/716514/download     Fact sheet for patients: https://www.fda.gov/media/065459/download  Fact sheet for providers: " https://www.fda.gov/media/239645/download    Fact sheet for patients: https://www.fda.gov/media/162615/download    Test performed by PCR.   PROTIME-INR - Normal   URINALYSIS W/ CULTURE IF INDICATED - Normal    Narrative:     Urine microscopic not indicated.   TROPONIN (IN-HOUSE) - Normal    Narrative:     Troponin T Reference Range:  <= 0.03 ng/mL-   Negative for AMI  >0.03 ng/mL-     Abnormal for myocardial necrosis.  Clinicians would have to utilize clinical acumen, EKG, Troponin and serial changes to determine if it is an Acute Myocardial Infarction or myocardial injury due to an underlying chronic condition.       Results may be falsely decreased if patient taking Biotin.     MAGNESIUM - Normal   POC LACTATE - Normal   BLOOD CULTURE   BLOOD CULTURE   POCT GLUCOSE FINGERSTICK   POC LACTATE   TYPE AND SCREEN   CBC AND DIFFERENTIAL    Narrative:     The following orders were created for panel order CBC & Differential.  Procedure                               Abnormality         Status                     ---------                               -----------         ------                     CBC Auto Differential[[id number]]        Abnormal            Final result                 Please view results for these tests on the individual orders.   EXTRA TUBES    Narrative:     The following orders were created for panel order Extra Tubes.  Procedure                               Abnormality         Status                     ---------                               -----------         ------                     Gold Top - SST[[id number]]                                   Final result                 Please view results for these tests on the individual orders.   GOLD TOP - SST     XR Chest 1 View    Result Date: 11/13/2021  No acute cardiopulmonary abnormality is identified.  Electronically Signed By-Dahlia Duque MD On:11/13/2021 6:52 PM This report was finalized on [id number] by  Dahlia Duque MD.    CT Angiogram  "Head w AI Analysis of LVO    Result Date: 11/13/2021  1. No acute intracranial abnormality evident by noncontrast CT head examination. 2. Mild volume loss and chronic microvascular ischemic changes. 3. No hemodynamically significant stenosis or dissection within the vasculature of the neck. 4. No hemodynamically significant stenosis or branch vessel occlusion intracranially. Electronically signed by:  Petros Villaseñor M.D.  11/13/2021 7:09 PM                                         MDM  Number of Diagnoses or Management Options     Amount and/or Complexity of Data Reviewed  Clinical lab tests: reviewed  Tests in the radiology section of CPT®: reviewed  Tests in the medicine section of CPT®: reviewed    Risk of Complications, Morbidity, and/or Mortality  Presenting problems: low  Diagnostic procedures: low  Management options: low    Patient Progress  Patient progress: stable       Chart review:  See above    EKG: EKG reviewed by myself a shows atrial fibrillation rate 116 bpm, no ST elevation apparent nd interpreted by ,  A. fib on last EKG in July 2021.    Imaging: See above  Labs: UA unremarkable.  Initial lactic normal.  COVID-19 swab negative.  Blood cultures x2 pending.  TSH slightly elevated 4.53.  Magnesium normal.  Digoxin level low.  Initial troponin negative.  CBC and CMP largely unremarkable for acute findings.    Vitals:  /70   Pulse 120   Temp 98.1 °F (36.7 °C)   Resp 17   Ht 162.6 cm (64\")   Wt 79.4 kg (175 lb)   SpO2 95%   BMI 30.04 kg/m²       Medications given:    Medications   sodium chloride 0.9 % flush 10 mL (has no administration in time range)   iopamidol (ISOVUE-370) 76 % injection 100 mL (100 mL Intravenous Given 11/13/21 2021)   morphine injection 2 mg (2 mg Intravenous Given 11/13/21 2142)   HYDROcodone-acetaminophen (NORCO) 5-325 MG per tablet 1 tablet (1 tablet Oral Given 11/13/21 2140)       Procedures:    MDM: Patient is an 81-year-old female comes in " complaining of left-sided weakness and strokelike symptoms.  Patient is not a TPA candidate as last known normal was over 24 hours ago and patient is already on Eliquis for atrial fibrillation.  CT head without contrast as well as CTAs of head and neck were unremarkable for acute findings.  Patient is afebrile, heart rate 110-120 and A. fib on EKG.  Patient was spiked to 140s that may be pain related secondary to her chronic back pain which she takes Norco and has not taken yet today.  Lab work largely unremarkable at this time.  NIH was 0 upon exam today.  Spoke with KALYANI Briceno, who accepted patient behalf of hospitalist team and Dr. Sheth for admission to hospital for MRI and further stroke work-up. See full discharge instructions for further details.  Results and plan discussed with patient and is agreeable with plan.        Final diagnoses:   Stroke-like symptoms   Left-sided weakness       ED Disposition  ED Disposition     ED Disposition Condition Comment    Decision to Admit  Level of Care: Telemetry [5]   Admitting Physician: JOSE JUAN DEAL [[id number]]   Attending Physician: JOSE JUAN DEAL [[id number]]            No follow-up provider specified.       Medication List      No changes were made to your prescriptions during this visit.          Alex Munoz PA  11/13/21 6811

## 2021-11-14 ENCOUNTER — APPOINTMENT (OUTPATIENT)
Dept: MRI IMAGING | Facility: HOSPITAL | Age: 81
End: 2021-11-14

## 2021-11-14 ENCOUNTER — HOME HEALTH ADMISSION (OUTPATIENT)
Dept: HOME HEALTH SERVICES | Facility: HOME HEALTHCARE | Age: 81
End: 2021-11-14

## 2021-11-14 LAB
ALBUMIN SERPL-MCNC: 3.8 G/DL (ref 3.5–5.2)
ALBUMIN/GLOB SERPL: 1.7 G/DL
ALP SERPL-CCNC: 65 U/L (ref 39–117)
ALT SERPL W P-5'-P-CCNC: 9 U/L (ref 1–33)
ANION GAP SERPL CALCULATED.3IONS-SCNC: 11 MMOL/L (ref 5–15)
AST SERPL-CCNC: 13 U/L (ref 1–32)
BILIRUB SERPL-MCNC: 0.6 MG/DL (ref 0–1.2)
BUN SERPL-MCNC: 12 MG/DL (ref 8–23)
BUN/CREAT SERPL: 18.5 (ref 7–25)
CALCIUM SPEC-SCNC: 8.6 MG/DL (ref 8.6–10.5)
CHLORIDE SERPL-SCNC: 108 MMOL/L (ref 98–107)
CO2 SERPL-SCNC: 24 MMOL/L (ref 22–29)
CREAT SERPL-MCNC: 0.65 MG/DL (ref 0.57–1)
DEPRECATED RDW RBC AUTO: 52.5 FL (ref 37–54)
ERYTHROCYTE [DISTWIDTH] IN BLOOD BY AUTOMATED COUNT: 17.3 % (ref 12.3–15.4)
GFR SERPL CREATININE-BSD FRML MDRD: 88 ML/MIN/1.73
GLOBULIN UR ELPH-MCNC: 2.3 GM/DL
GLUCOSE BLDC GLUCOMTR-MCNC: 106 MG/DL (ref 70–105)
GLUCOSE BLDC GLUCOMTR-MCNC: 118 MG/DL (ref 70–105)
GLUCOSE BLDC GLUCOMTR-MCNC: 83 MG/DL (ref 70–105)
GLUCOSE SERPL-MCNC: 86 MG/DL (ref 65–99)
HCT VFR BLD AUTO: 30.8 % (ref 34–46.6)
HGB BLD-MCNC: 10.1 G/DL (ref 12–15.9)
MCH RBC QN AUTO: 28.3 PG (ref 26.6–33)
MCHC RBC AUTO-ENTMCNC: 32.8 G/DL (ref 31.5–35.7)
MCV RBC AUTO: 86.2 FL (ref 79–97)
PLATELET # BLD AUTO: 250 10*3/MM3 (ref 140–450)
PMV BLD AUTO: 8.1 FL (ref 6–12)
POTASSIUM SERPL-SCNC: 4.1 MMOL/L (ref 3.5–5.2)
PROT SERPL-MCNC: 6.1 G/DL (ref 6–8.5)
RBC # BLD AUTO: 3.57 10*6/MM3 (ref 3.77–5.28)
SODIUM SERPL-SCNC: 143 MMOL/L (ref 136–145)
WBC # BLD AUTO: 6.1 10*3/MM3 (ref 3.4–10.8)

## 2021-11-14 PROCEDURE — 99225 PR SBSQ OBSERVATION CARE/DAY 25 MINUTES: CPT | Performed by: HOSPITALIST

## 2021-11-14 PROCEDURE — 85027 COMPLETE CBC AUTOMATED: CPT | Performed by: NURSE PRACTITIONER

## 2021-11-14 PROCEDURE — 82962 GLUCOSE BLOOD TEST: CPT

## 2021-11-14 PROCEDURE — 80053 COMPREHEN METABOLIC PANEL: CPT | Performed by: NURSE PRACTITIONER

## 2021-11-14 PROCEDURE — G0378 HOSPITAL OBSERVATION PER HR: HCPCS

## 2021-11-14 PROCEDURE — 97161 PT EVAL LOW COMPLEX 20 MIN: CPT

## 2021-11-14 PROCEDURE — 99214 OFFICE O/P EST MOD 30 MIN: CPT | Performed by: NURSE PRACTITIONER

## 2021-11-14 PROCEDURE — 70551 MRI BRAIN STEM W/O DYE: CPT

## 2021-11-14 RX ORDER — SILDENAFIL CITRATE 20 MG/1
20 TABLET ORAL 2 TIMES DAILY
Status: DISCONTINUED | OUTPATIENT
Start: 2021-11-14 | End: 2021-11-16 | Stop reason: HOSPADM

## 2021-11-14 RX ORDER — TRAZODONE HYDROCHLORIDE 50 MG/1
25 TABLET ORAL NIGHTLY
Status: DISCONTINUED | OUTPATIENT
Start: 2021-11-14 | End: 2021-11-16 | Stop reason: HOSPADM

## 2021-11-14 RX ORDER — FAMOTIDINE 20 MG/1
20 TABLET, FILM COATED ORAL DAILY
Status: DISCONTINUED | OUTPATIENT
Start: 2021-11-14 | End: 2021-11-16 | Stop reason: HOSPADM

## 2021-11-14 RX ORDER — PANTOPRAZOLE SODIUM 40 MG/1
40 TABLET, DELAYED RELEASE ORAL
Status: DISCONTINUED | OUTPATIENT
Start: 2021-11-14 | End: 2021-11-14

## 2021-11-14 RX ORDER — ROPINIROLE 0.25 MG/1
0.25 TABLET, FILM COATED ORAL NIGHTLY
Status: DISCONTINUED | OUTPATIENT
Start: 2021-11-14 | End: 2021-11-16 | Stop reason: HOSPADM

## 2021-11-14 RX ORDER — METOPROLOL SUCCINATE 50 MG/1
50 TABLET, EXTENDED RELEASE ORAL
Status: DISCONTINUED | OUTPATIENT
Start: 2021-11-14 | End: 2021-11-16 | Stop reason: HOSPADM

## 2021-11-14 RX ORDER — LEVOTHYROXINE SODIUM 0.07 MG/1
75 TABLET ORAL DAILY
Status: DISCONTINUED | OUTPATIENT
Start: 2021-11-14 | End: 2021-11-16 | Stop reason: HOSPADM

## 2021-11-14 RX ORDER — DIGOXIN 125 MCG
125 TABLET ORAL
Status: DISCONTINUED | OUTPATIENT
Start: 2021-11-14 | End: 2021-11-16 | Stop reason: HOSPADM

## 2021-11-14 RX ORDER — GABAPENTIN 600 MG/1
600 TABLET ORAL EVERY 12 HOURS SCHEDULED
Status: DISCONTINUED | OUTPATIENT
Start: 2021-11-14 | End: 2021-11-16 | Stop reason: HOSPADM

## 2021-11-14 RX ORDER — CYCLOBENZAPRINE HCL 10 MG
5 TABLET ORAL ONCE
Status: COMPLETED | OUTPATIENT
Start: 2021-11-14 | End: 2021-11-14

## 2021-11-14 RX ORDER — CYCLOBENZAPRINE HCL 10 MG
5 TABLET ORAL 2 TIMES DAILY PRN
Status: DISCONTINUED | OUTPATIENT
Start: 2021-11-14 | End: 2021-11-16 | Stop reason: HOSPADM

## 2021-11-14 RX ORDER — LIDOCAINE 50 MG/G
1 PATCH TOPICAL DAILY PRN
Status: DISCONTINUED | OUTPATIENT
Start: 2021-11-14 | End: 2021-11-16 | Stop reason: HOSPADM

## 2021-11-14 RX ORDER — HYDROCODONE BITARTRATE AND ACETAMINOPHEN 7.5; 325 MG/1; MG/1
1 TABLET ORAL 4 TIMES DAILY PRN
Status: DISCONTINUED | OUTPATIENT
Start: 2021-11-14 | End: 2021-11-16 | Stop reason: HOSPADM

## 2021-11-14 RX ADMIN — HYDROCODONE BITARTRATE AND ACETAMINOPHEN 1 TABLET: 7.5; 325 TABLET ORAL at 01:54

## 2021-11-14 RX ADMIN — DIGOXIN 125 MCG: 125 TABLET ORAL at 15:03

## 2021-11-14 RX ADMIN — METOPROLOL SUCCINATE 50 MG: 50 TABLET, EXTENDED RELEASE ORAL at 08:13

## 2021-11-14 RX ADMIN — ROPINIROLE HYDROCHLORIDE 0.25 MG: 0.25 TABLET, FILM COATED ORAL at 01:54

## 2021-11-14 RX ADMIN — TRAZODONE HYDROCHLORIDE 25 MG: 50 TABLET ORAL at 20:12

## 2021-11-14 RX ADMIN — FAMOTIDINE 20 MG: 20 TABLET ORAL at 08:12

## 2021-11-14 RX ADMIN — SILDENAFIL 20 MG: 20 TABLET, FILM COATED ORAL at 20:08

## 2021-11-14 RX ADMIN — CYCLOBENZAPRINE 5 MG: 10 TABLET, FILM COATED ORAL at 08:12

## 2021-11-14 RX ADMIN — ATORVASTATIN CALCIUM 80 MG: 40 TABLET, FILM COATED ORAL at 00:55

## 2021-11-14 RX ADMIN — CLOPIDOGREL BISULFATE 75 MG: 75 TABLET ORAL at 08:13

## 2021-11-14 RX ADMIN — APIXABAN 5 MG: 5 TABLET, FILM COATED ORAL at 08:13

## 2021-11-14 RX ADMIN — SILDENAFIL 20 MG: 20 TABLET, FILM COATED ORAL at 08:13

## 2021-11-14 RX ADMIN — APIXABAN 5 MG: 5 TABLET, FILM COATED ORAL at 20:09

## 2021-11-14 RX ADMIN — GABAPENTIN 600 MG: 600 TABLET, FILM COATED ORAL at 20:09

## 2021-11-14 RX ADMIN — CYCLOBENZAPRINE 5 MG: 10 TABLET, FILM COATED ORAL at 00:55

## 2021-11-14 RX ADMIN — SERTRALINE HYDROCHLORIDE 50 MG: 50 TABLET ORAL at 01:54

## 2021-11-14 RX ADMIN — SODIUM CHLORIDE, PRESERVATIVE FREE 10 ML: 5 INJECTION INTRAVENOUS at 20:09

## 2021-11-14 RX ADMIN — GABAPENTIN 600 MG: 600 TABLET, FILM COATED ORAL at 01:54

## 2021-11-14 RX ADMIN — CYCLOBENZAPRINE 5 MG: 10 TABLET, FILM COATED ORAL at 20:12

## 2021-11-14 RX ADMIN — GABAPENTIN 600 MG: 600 TABLET, FILM COATED ORAL at 08:16

## 2021-11-14 RX ADMIN — SERTRALINE HYDROCHLORIDE 50 MG: 50 TABLET ORAL at 20:13

## 2021-11-14 RX ADMIN — LEVOTHYROXINE SODIUM 75 MCG: 0.07 TABLET ORAL at 08:13

## 2021-11-14 RX ADMIN — ROPINIROLE HYDROCHLORIDE 0.25 MG: 0.25 TABLET, FILM COATED ORAL at 20:12

## 2021-11-14 RX ADMIN — ATORVASTATIN CALCIUM 80 MG: 40 TABLET, FILM COATED ORAL at 20:08

## 2021-11-14 RX ADMIN — TRAZODONE HYDROCHLORIDE 25 MG: 50 TABLET ORAL at 01:54

## 2021-11-14 RX ADMIN — HYDROCODONE BITARTRATE AND ACETAMINOPHEN 1 TABLET: 7.5; 325 TABLET ORAL at 15:03

## 2021-11-14 RX ADMIN — LIDOCAINE 1 PATCH: 50 PATCH TOPICAL at 01:56

## 2021-11-14 NOTE — PLAN OF CARE
Neuro consult called. NIH 0.   Problem: Fall Injury Risk  Goal: Absence of Fall and Fall-Related Injury  Outcome: Ongoing, Progressing  Intervention: Identify and Manage Contributors to Fall Injury Risk  Recent Flowsheet Documentation  Taken 11/14/2021 0200 by Cherelle Woodward RN  Medication Review/Management: medications reviewed  Taken 11/13/2021 2324 by Cherelle Woodward RN  Medication Review/Management: medications reviewed  Intervention: Promote Injury-Free Environment  Recent Flowsheet Documentation  Taken 11/14/2021 0200 by Cherelle Woodward RN  Safety Promotion/Fall Prevention: safety round/check completed  Taken 11/13/2021 2324 by Cherelle Woodward RN  Safety Promotion/Fall Prevention: safety round/check completed     Problem: Adult Inpatient Plan of Care  Goal: Plan of Care Review  Outcome: Ongoing, Progressing  Goal: Patient-Specific Goal (Individualized)  Outcome: Ongoing, Progressing  Goal: Absence of Hospital-Acquired Illness or Injury  Outcome: Ongoing, Progressing  Intervention: Identify and Manage Fall Risk  Recent Flowsheet Documentation  Taken 11/14/2021 0200 by Cherelle Woodward RN  Safety Promotion/Fall Prevention: safety round/check completed  Taken 11/13/2021 2324 by Cherelle Woodward RN  Safety Promotion/Fall Prevention: safety round/check completed  Intervention: Prevent and Manage VTE (venous thromboembolism) Risk  Recent Flowsheet Documentation  Taken 11/13/2021 2324 by Cherelle Woodward RN  VTE Prevention/Management: sequential compression devices on  Goal: Optimal Comfort and Wellbeing  Outcome: Ongoing, Progressing  Intervention: Provide Person-Centered Care  Recent Flowsheet Documentation  Taken 11/13/2021 2324 by Cherelle Woodward RN  Trust Relationship/Rapport:   care explained   choices provided   emotional support provided  Goal: Readiness for Transition of Care  Outcome: Ongoing, Progressing  Intervention: Mutually Develop Transition  Plan  Recent Flowsheet Documentation  Taken 11/13/2021 2302 by Cherelle Woodward RN  Transportation Anticipated: family or friend will provide  Patient/Family Anticipated Services at Transition: none  Patient/Family Anticipates Transition to: home  Taken 11/13/2021 2301 by Cherelle Woodward, BRENDAN  Equipment Currently Used at Home: walker, standard     Problem: Hypertension Comorbidity  Goal: Blood Pressure in Desired Range  Outcome: Ongoing, Progressing  Intervention: Maintain Hypertension-Management Strategies  Recent Flowsheet Documentation  Taken 11/14/2021 0200 by Cherelle Woodward RN  Medication Review/Management: medications reviewed  Taken 11/13/2021 2324 by Cherelle Woodward RN  Medication Review/Management: medications reviewed     Problem: Pain Chronic (Persistent) (Comorbidity Management)  Goal: Acceptable Pain Control and Functional Ability  Outcome: Ongoing, Progressing  Intervention: Manage Persistent Pain  Recent Flowsheet Documentation  Taken 11/14/2021 0200 by Cherelle Woodward RN  Medication Review/Management: medications reviewed  Taken 11/13/2021 2324 by Cherelle Woodward RN  Medication Review/Management: medications reviewed   Goal Outcome Evaluation:

## 2021-11-14 NOTE — CONSULTS
Primary Care Provider: Anayeli Costa APRN     Consult requested by: KALYANI Guerra    Reason for Consultation: Neurological evaluation     History taken from: patient chart RN    Chief complaint: Confusion, left side weekness        SUBJECTIVE:    History of present illness: Background per H&P: Catalina Moreno is a 81 y.o. female who presented to [facility] on 11/13/2021 complaining of confusion and dysphagia that started 3-4 days ago and left sided weakness she reports started on 11/12/2021.  Patient states that currently she has no signs and symptoms of confusion, decreased concentration, or fatigue.  Patient states she has left lower leg pain that she rates as a 10/10 that starts at her buttocks and radiates down her leg she describes the pain as a constant throbbing toothache with no exacerbating or eliminating factors.  Patient is poor historian unclear of accurate information.  She denies chest pain, shortness of breath, diaphoresis, palpitation chills, fever, dizziness, lightheadedness, headache, vision changes, nausea, vomiting, constipation, or  complaints.  Patient does admit to chronic diarrhea, and chronic back and leg pain.     At admission, pt's NIH was reportedly 0.       In the emergency room today patient received 5-325 mg hydrocodone, 2 mg morphine, underwent CT scan of head, chest x-ray, EKG, and lab assessment.  - Portions of the above HPI were copied from previous encounters and edited as appropriate.    Pt is a very poor historian and is unable to offer many details. She states she has issues with her left leg chronically due to sciatic pain. She noticed her left side felt weak yesterday so she decided to come to the ED after she could not get ahold of her son. She reports the weakness resolved, but is unable to say how long it lasted. She cannot elaborate on any other symptoms. She feels back to baseline now, but is c/o left leg/sciatic pain.       Review of Systems    Constitutional: Negative for chills, diaphoresis and fatigue.   Eyes: Negative for photophobia and visual disturbance.   Musculoskeletal:        Left leg/sciatic pain   Neurological: Negative for dizziness, tremors, seizures, syncope, facial asymmetry, speech difficulty, weakness, light-headedness, numbness and headaches.            PATIENT HISTORY:  Past Medical History:   Diagnosis Date   • Anxiety    • Arthritis    • Atrial fibrillation (CMS/HCC)     paroxysmal   • Broken shoulder     left-- due to fall 11-7-19 was at [facility]   • Buttock pain     rt side   • DDD (degenerative disc disease), lumbar    • Depression    • Edema     9/2020 foot   • GERD (gastroesophageal reflux disease)    • H/O fall    • Hip pain     rt   • Hypertension    • Hypothyroidism    • Insomnia    • Leg pain     rt   • Low back pain    • Neuropathy    • PONV (postoperative nausea and vomiting)    • Pulmonary hypertension (CMS/HCC)    • Radiculopathy    • Restless legs    • Spondylolisthesis    • Urinary incontinence    ,   Past Surgical History:   Procedure Laterality Date   • BACK SURGERY  07/19/2018    PDC &  PSF L3-L5 insitu   • CARDIAC CATHETERIZATION N/A 4/2/2021    Procedure: Cardiac Catheterization/Vascular Study;  Surgeon: Barrett Evans MD;  Location: [facility] INVASIVE LOCATION;  Service: Cardiovascular;  Laterality: N/A;   • CHOLECYSTECTOMY     • COLONOSCOPY     • HYSTERECTOMY     • ROTATOR CUFF REPAIR Left    ,   Family History   Problem Relation Age of Onset   • Cancer Father    • Diabetes Son    • Cancer Paternal Aunt    • Heart disease Paternal Aunt    • Cancer Paternal Uncle    ,   Social History     Tobacco Use   • Smoking status: Never Smoker   • Smokeless tobacco: Never Used   Vaping Use   • Vaping Use: Never used   Substance Use Topics   • Alcohol use: No   • Drug use: No   ,   Medications Prior to Admission   Medication Sig Dispense Refill Last Dose   • apixaban (ELIQUIS) 5 MG tablet tablet Take 1 tablet  by mouth Every 12 (Twelve) Hours. 180 tablet 3 11/12/2021 at Unknown time   • cyclobenzaprine (FLEXERIL) 5 MG tablet Take 1 tablet by mouth 2 (Two) Times a Day As Needed for Muscle Spasms. 60 tablet 0 11/12/2021 at Unknown time   • digoxin (LANOXIN) 125 MCG tablet Take 1 tablet by mouth Daily. 30 tablet 0 11/12/2021 at Unknown time   • famotidine (PEPCID) 20 MG tablet Take 20 mg by mouth Daily.  0 11/12/2021 at Unknown time   • furosemide (LASIX) 40 MG tablet Take 40 mg by mouth 2 (Two) Times a Day. Uses for leg swelling   Patient Taking Differently at Unknown time   • gabapentin (NEURONTIN) 600 MG tablet TAKE 1 TABLET BY MOUTH FOUR TIMES DAILY (Patient taking differently: 2 (Two) Times a Day.) 120 tablet 11 Patient Taking Differently at Unknown time   • metoprolol succinate XL (TOPROL-XL) 50 MG 24 hr tablet TAKE 1 TABLET BY MOUTH DAILY. CALL MEDICAL DOCTOR OFFICE TO MAKE AND KEEP APPOINTMENT FOR FURTHER REFILLS 90 tablet 0 11/12/2021 at Unknown time   • pantoprazole (PROTONIX) 40 MG EC tablet Take 40 mg by mouth Daily With Lunch. Afternoons  0 11/12/2021 at Unknown time   • rOPINIRole (REQUIP) 0.25 MG tablet Take 1 tablet by mouth Every Night. Take 1 hour before bedtime 30 tablet 5 11/12/2021 at Unknown time   • traZODone (DESYREL) 50 MG tablet Take 25 mg by mouth Every Night.   11/12/2021 at Unknown time   • HYDROcodone-acetaminophen (NORCO) 7.5-325 MG per tablet Take 1 tablet by mouth 4 (Four) Times a Day As Needed for Severe Pain . DNF before 10/12/2021 120 tablet 0    • levothyroxine (SYNTHROID, LEVOTHROID) 75 MCG tablet Take 75 mcg by mouth Daily. Take preop  0    • lidocaine (LIDODERM) 5 % Place 1 patch on the skin as directed by provider Daily As Needed for Mild Pain . Remove & Discard patch within 12 hours or as directed by MD      • sertraline (ZOLOFT) 50 MG tablet Take 50 mg by mouth Every Night.   Unknown at Unknown time   • sildenafil (REVATIO) 20 MG tablet Take 20 mg by mouth 2 (Two) Times a Day.    Unknown at Unknown time   , Scheduled Meds:  apixaban, 5 mg, Oral, Q12H  atorvastatin, 80 mg, Oral, Nightly  clopidogrel, 75 mg, Oral, Daily  digoxin, 125 mcg, Oral, Daily  famotidine, 20 mg, Oral, Daily  gabapentin, 600 mg, Oral, Q12H  levothyroxine, 75 mcg, Oral, Daily  metoprolol succinate XL, 50 mg, Oral, Q24H  rOPINIRole, 0.25 mg, Oral, Nightly  sertraline, 50 mg, Oral, Nightly  sildenafil, 20 mg, Oral, BID  traZODone, 25 mg, Oral, Nightly    , Continuous Infusions:   , PRN Meds:  cyclobenzaprine  •  HYDROcodone-acetaminophen  •  lidocaine  •  sodium chloride, Allergies:  Baclofen, Codeine, Ibuprofen, Methocarbamol, Naproxen, Tizanidine hcl, and Tolmetin    ________________________________________________________        OBJECTIVE:  Upon today's exam, pt is awake and alert.       Neurologic Exam  PHYSICAL EXAM:    CONSTITUTIONAL: The patient is in no apparent distress, bright awake and alert. There is no shortness of breath.     HEENT: There is no tenderness over the temporal arteries bilaterally. Normocephalic, atraumatic. TMJ open symmetrically without tenderness.    NECK: ROM normal, supple    RESPIRATORY: Breathing unlabored, Breath sounds are normal    CARDIAC: Regular rate and rhythm.     EXTREMITIES:  No clubbing, cyanosis or edema.    NEUROLOGICAL:    Cognition:   Fully oriented.  Fund of knowledge good  Concentration and attention somewhat limited. Requires redirection and repeated instruction.   Language normal with normal comprehension, fluent speech, intact repetition and naming.   Poor historian    Cranial nerves;    II - pupils bilaterally equal reacting to light,  No new Visual field deficits;  Fundoscopic exam- Not able to be done, non-dilated exam  III,IV,VI: EOMI with no diplopia  V: Normal facial sensations  VII: No facial asymmetry,  VIII: No New hearing abnormality  IX, X, XI: normal gag and shoulder shrug;  XII: tongue is in the midline.    Sensory:  Intact to light touch in all  extremities.     Motor: Strength 5-/5 bilaterally upper and lower extremities. No involuntary movements present. Normal tone and bulk.  Deep tendon reflexes: 2/4 and symmetrical in biceps, brachioradialis, triceps, bilateral 0/4 knees and ankles. Both plantars are mute    Cerebellar: Finger to nose and mirror movements normal bilaterally.    Gait and balance: deferred    ________________________________________________________   RESULTS REVIEW:    VITAL SIGNS:   Temp:  [97 °F (36.1 °C)-98.5 °F (36.9 °C)] 97.7 °F (36.5 °C)  Heart Rate:  [] 115  Resp:  [13-18] 17  BP: ()/(37-91) 104/58     LABS:  WBC   Date Value Ref Range Status   11/14/2021 6.10 3.40 - 10.80 10*3/mm3 Final     RBC   Date Value Ref Range Status   11/14/2021 3.57 (L) 3.77 - 5.28 10*6/mm3 Final     Hemoglobin   Date Value Ref Range Status   11/14/2021 10.1 (L) 12.0 - 15.9 g/dL Final     Hematocrit   Date Value Ref Range Status   11/14/2021 30.8 (L) 34.0 - 46.6 % Final     MCV   Date Value Ref Range Status   11/14/2021 86.2 79.0 - 97.0 fL Final     MCH   Date Value Ref Range Status   11/14/2021 28.3 26.6 - 33.0 pg Final     MCHC   Date Value Ref Range Status   11/14/2021 32.8 31.5 - 35.7 g/dL Final     RDW   Date Value Ref Range Status   11/14/2021 17.3 (H) 12.3 - 15.4 % Final     RDW-SD   Date Value Ref Range Status   11/14/2021 52.5 37.0 - 54.0 fl Final     MPV   Date Value Ref Range Status   11/14/2021 8.1 6.0 - 12.0 fL Final     Platelets   Date Value Ref Range Status   11/14/2021 250 140 - 450 10*3/mm3 Final     Neutrophil %   Date Value Ref Range Status   11/13/2021 48.7 42.7 - 76.0 % Final     Lymphocyte %   Date Value Ref Range Status   11/13/2021 36.8 19.6 - 45.3 % Final     Monocyte %   Date Value Ref Range Status   11/13/2021 10.5 5.0 - 12.0 % Final     Eosinophil %   Date Value Ref Range Status   11/13/2021 2.7 0.3 - 6.2 % Final     Basophil %   Date Value Ref Range Status   11/13/2021 1.3 0.0 - 1.5 % Final     Neutrophils,  Absolute   Date Value Ref Range Status   11/13/2021 3.00 1.70 - 7.00 10*3/mm3 Final     Lymphocytes, Absolute   Date Value Ref Range Status   11/13/2021 2.30 0.70 - 3.10 10*3/mm3 Final     Monocytes, Absolute   Date Value Ref Range Status   11/13/2021 0.60 0.10 - 0.90 10*3/mm3 Final     Eosinophils, Absolute   Date Value Ref Range Status   11/13/2021 0.20 0.00 - 0.40 10*3/mm3 Final     Basophils, Absolute   Date Value Ref Range Status   11/13/2021 0.10 0.00 - 0.20 10*3/mm3 Final     nRBC   Date Value Ref Range Status   11/13/2021 0.1 0.0 - 0.2 /100 WBC Final     Glucose   Date Value Ref Range Status   11/14/2021 86 65 - 99 mg/dL Final     BUN   Date Value Ref Range Status   11/14/2021 12 8 - 23 mg/dL Final     Creatinine   Date Value Ref Range Status   11/14/2021 0.65 0.57 - 1.00 mg/dL Final     Sodium   Date Value Ref Range Status   11/14/2021 143 136 - 145 mmol/L Final     Potassium   Date Value Ref Range Status   11/14/2021 4.1 3.5 - 5.2 mmol/L Final     Chloride   Date Value Ref Range Status   11/14/2021 108 (H) 98 - 107 mmol/L Final     CO2   Date Value Ref Range Status   11/14/2021 24.0 22.0 - 29.0 mmol/L Final     Calcium   Date Value Ref Range Status   11/14/2021 8.6 8.6 - 10.5 mg/dL Final     Total Protein   Date Value Ref Range Status   11/14/2021 6.1 6.0 - 8.5 g/dL Final     Albumin   Date Value Ref Range Status   11/14/2021 3.80 3.50 - 5.20 g/dL Final     ALT (SGPT)   Date Value Ref Range Status   11/14/2021 9 1 - 33 U/L Final     AST (SGOT)   Date Value Ref Range Status   11/14/2021 13 1 - 32 U/L Final     Alkaline Phosphatase   Date Value Ref Range Status   11/14/2021 65 39 - 117 U/L Final     Total Bilirubin   Date Value Ref Range Status   11/14/2021 0.6 0.0 - 1.2 mg/dL Final     eGFR Non  Amer   Date Value Ref Range Status   11/14/2021 88 >60 mL/min/1.73 Final     BUN/Creatinine Ratio   Date Value Ref Range Status   11/14/2021 18.5 7.0 - 25.0 Final     Anion Gap   Date Value Ref Range Status    11/14/2021 11.0 5.0 - 15.0 mmol/L Final       Lab Results   Component Value Date    TSH 4.520 (H) 11/13/2021    LDL 57 11/13/2021    HGBA1C 5.6 06/04/2019    [id number] 449 01/04/2020         IMAGING STUDIES:  CT Angiogram Neck    Result Date: 11/13/2021  1. No acute intracranial abnormality evident by noncontrast CT head examination. 2. Mild volume loss and chronic microvascular ischemic changes. 3. No hemodynamically significant stenosis or dissection within the vasculature of the neck. 4. No hemodynamically significant stenosis or branch vessel occlusion intracranially. Electronically signed by:  Petros Villaseñor M.D.  11/13/2021 7:09 PM    XR Chest 1 View    Result Date: 11/13/2021  No acute cardiopulmonary abnormality is identified.  Electronically Signed By-Dahlia Duque MD On:11/13/2021 6:52 PM This report was finalized on [id number] by  Dahlia Duque MD.    CT Head Without Contrast Stroke Protocol    Result Date: 11/13/2021  1. No acute intracranial abnormality evident by noncontrast CT head examination. 2. Mild volume loss and chronic microvascular ischemic changes. 3. No hemodynamically significant stenosis or dissection within the vasculature of the neck. 4. No hemodynamically significant stenosis or branch vessel occlusion intracranially. Electronically signed by:  Petros Villaesñor M.D.  11/13/2021 7:09 PM    CT Angiogram Head w AI Analysis of LVO    Result Date: 11/13/2021  1. No acute intracranial abnormality evident by noncontrast CT head examination. 2. Mild volume loss and chronic microvascular ischemic changes. 3. No hemodynamically significant stenosis or dissection within the vasculature of the neck. 4. No hemodynamically significant stenosis or branch vessel occlusion intracranially. Electronically signed by:  Petros Villaseñor M.D.  11/13/2021 7:09 PM      I reviewed the patient's new clinical results.      ________________________________________________________     PROBLEM LIST:     Stroke-like symptoms    Neuropathic pain    Depressive disorder    Gastroesophageal reflux disease    Essential hypertension    Hypothyroidism    Atrial fibrillation ([facility])    Stage 2 chronic kidney disease    Chronic low back pain    Polypharmacy    Generalized anxiety disorder    Restless legs syndrome    Paroxysmal atrial fibrillation ([facility])    Essential (primary) hypertension    Chronic diastolic CHF (congestive heart failure) ([facility])          Assessment/Plan   ASSESSMENT/PLAN:  1. Left sided weakness, slurred speech, confusion---resolved. No acute stroke. Possible TIA.   - CT head: No acute intracranial abnormality. Mild volume loss and chronic microvascular ischemic changes.   - CTA head and neck: No hemodynamically significant stenosis or dissection within the vasculature of the neck. No hemodynamically significant stenosis or branch vessel occlusion intracranially.  - MRI brain: Report pending. Images reviewed: No acute stroke appreciated  - Echo (7/2021): EF = 60% Left ventricular systolic function is normal. Moderate aortic valve regurgitation is present.  - EKG: Atrial fibrillation. Consider anteroseptal infarct  - Labs: A1C: P, B12: P, LDL: 57, TSH: 4.52, UA negative  - Antithrombotics: Continue home Eliquis. No significant intracranial atherosclerotic disease so will defer additional antiplatelet therapy due to increased risk of bleeding complications. Pt states she has GI issues and would like to avoid any additional antithrombotics.   - Statin: Lipitor 80mg qhs  - PT/OT/ST as appropriate, Neuro checks per protocol, DVT prophylaxis, Stroke education    2. Chronic back pain, lumbar radiculopathy  - Pain control per primary    3. Hypertension  - Strict BP control, monitor per protocol    4. Atrial fibrillation  - On Eliquis    5. Hypothyroid  - TSH 4.52  - Continue home meds    6. Modification of stroke risk factors:   - Blood pressure should be less than 130/80 outpatient, HbA1c less than 6.5, LDL less than  70; b12>500 and smoking cessation if applicable. We would be grateful if the primary team / primary care physician would keep a close watch on the above targets.  - Stroke education  - Follow up with neurologist of choice    Will sign off. Please call with questions or concerns.     I discussed the patient's findings and my recommendations with patient, nursing staff and consulting provider    KALYANI Beltran  11/14/21  14:37 EST

## 2021-11-14 NOTE — H&P
[facility] Medicine Services      Patient Name: Catalina Moreno  : 1940  MRN: [id number]  Primary Care Physician:  Anayeli Costa APRN  Date of admission: 2021      Subjective      Chief Complaint: Confusion, left side weekness     History of Present Illness: Catalina Moreno is a 81 y.o. female who presented to [facility] on 2021 complaining of confusion and dysphagia that started 3-4 days ago and left sided weakness she reports started last night .  Patient states that currently she has no signs and symptoms of confusion, decreased concentration, or fatigue.  Patient states she has left lower leg pain that she rates as a 10 out of 10 that starts at her buttocks and radiates down her leg she describes the pain as a constant throbbing toothache with no exacerbating or eliminating factors.  Patient is poor historian unclear of accurate information.  She denies chest pain, shortness of breath, diaphoresis, palpitation chills, fever, dizziness, lightheadedness, headache, vision changes, nausea, vomiting, constipation, or  complaints.  Patient does admit to chronic diarrhea, and chronic back and leg pain.    At time of assessment patient's NIH is 0 she is alert and oriented x3, she has no noted focal deficits.  Patient reports she is depressed and lonely at home, patient states her loneliness is possibly wh while she was having trouble concentrating.      In the emergency room today patient received 5-325 mg hydrocodone, 2 mg morphine, underwent CT scan of head, chest x-ray, EKG, and lab assessment.      Review of Systems   Constitutional: Positive for malaise/fatigue.   HENT: Negative.    Eyes: Negative.    Cardiovascular: Negative.    Respiratory: Negative.    Endocrine: Negative.    Skin: Negative.    Musculoskeletal: Positive for joint pain and muscle weakness.        Leg pain 10/10    Gastrointestinal: Negative.    Genitourinary: Negative.     Neurological: Positive for difficulty with concentration and weakness.   Psychiatric/Behavioral: Positive for depression.   All other systems reviewed and are negative.       Personal History     Past Medical History:   Diagnosis Date   • Anxiety    • Arthritis    • Atrial fibrillation (CMS/HCC)     paroxysmal   • Broken shoulder     left-- due to fall 11-7-19 was at [facility]   • Buttock pain     rt side   • DDD (degenerative disc disease), lumbar    • Depression    • Edema     9/2020 foot   • GERD (gastroesophageal reflux disease)    • H/O fall    • Hip pain     rt   • Hypertension    • Hypothyroidism    • Insomnia    • Leg pain     rt   • Low back pain    • Neuropathy    • PONV (postoperative nausea and vomiting)    • Pulmonary hypertension (CMS/HCC)    • Radiculopathy    • Restless legs    • Spondylolisthesis    • Urinary incontinence        Past Surgical History:   Procedure Laterality Date   • BACK SURGERY  07/19/2018    PDC &  PSF L3-L5 insitu   • CARDIAC CATHETERIZATION N/A 4/2/2021    Procedure: Cardiac Catheterization/Vascular Study;  Surgeon: Barrett Evans MD;  Location: [facility] CATH INVASIVE LOC[facility];  Service: Cardiovascular;  Laterality: N/A;   • CHOLECYSTECTOMY     • COLONOSCOPY     • HYSTERECTOMY     • ROTATOR CUFF REPAIR Left        Family History: family history includes Cancer in her father, paternal aunt, and paternal uncle; Diabetes in her son; Heart disease in her paternal aunt. Otherwise pertinent FHx was reviewed and not pertinent to current issue.    Social History:  reports that she has never smoked. She has never used smokeless tobacco. She reports that she does not drink alcohol and does not use drugs.    Home Medications:  Prior to Admission Medications     Prescriptions Last Dose Informant Patient Reported? Taking?    apixaban (ELIQUIS) 5 MG tablet tablet   No No    Take 1 tablet by mouth Every 12 (Twelve) Hours.    cyclobenzaprine (FLEXERIL) 5 MG tablet   No No    Take 1  tablet by mouth 2 (Two) Times a Day As Needed for Muscle Spasms.    digoxin (LANOXIN) 125 MCG tablet   No No    Take 1 tablet by mouth Daily.    famotidine (PEPCID) 20 MG tablet   Yes No    Take 20 mg by mouth Daily.    furosemide (LASIX) 40 MG tablet   Yes No    Take 40 mg by mouth 2 (Two) Times a Day. Uses for leg swelling    gabapentin (NEURONTIN) 600 MG tablet   No No    TAKE 1 TABLET BY MOUTH FOUR TIMES DAILY    HYDROcodone-acetaminophen (NORCO) 7.5-325 MG per tablet   No No    Take 1 tablet by mouth 4 (Four) Times a Day As Needed for Severe Pain . DNF before 10/12/2021    levothyroxine (SYNTHROID, LEVOTHROID) 75 MCG tablet   Yes No    Take 75 mcg by mouth Daily. Take preop    lidocaine (LIDODERM) 5 %   Yes No    Place 1 patch on the skin as directed by provider Daily As Needed for Mild Pain . Remove & Discard patch within 12 hours or as directed by MD    metoprolol succinate XL (TOPROL-XL) 50 MG 24 hr tablet   No No    TAKE 1 TABLET BY MOUTH DAILY. CALL MEDICAL DOCTOR OFFICE TO MAKE AND KEEP APPOINTMENT FOR FURTHER REFILLS    pantoprazole (PROTONIX) 40 MG EC tablet   Yes No    Take 40 mg by mouth Daily With Lunch. Afternoons    rOPINIRole (REQUIP) 0.25 MG tablet   No No    Take 1 tablet by mouth Every Night. Take 1 hour before bedtime    sertraline (ZOLOFT) 50 MG tablet   Yes No    Take 50 mg by mouth Every Night.    sildenafil (REVATIO) 20 MG tablet   Yes No    Take 20 mg by mouth 2 (Two) Times a Day.    traZODone (DESYREL) 50 MG tablet   Yes No    Take 25 mg by mouth Every Night.            Allergies:  Allergies   Allergen Reactions   • Baclofen Rash   • Codeine Nausea Only   • Ibuprofen Unknown (See Comments)     Patient doesn't know----Motin   • Methocarbamol Unknown (See Comments)     Patient doesn't know   • Naproxen Unknown (See Comments)     Pt. Doesn't know    • Tizanidine Hcl Hallucinations   • Tolmetin Dizziness     Pt. Doesn't know-- same as Tolectin       Objective      Vitals:   Temp:  [98.1 °F  (36.7 °C)] 98.1 °F (36.7 °C)  Heart Rate:  [101-141] 141  Resp:  [17-18] 17  BP: (113-159)/(61-91) 143/76    Physical Exam  Vitals and nursing note reviewed.   Constitutional:       General: She is not in acute distress.  HENT:      Head: Normocephalic and atraumatic.      Nose: Nose normal.      Mouth/Throat:      Mouth: Mucous membranes are dry.   Eyes:      Pupils: Pupils are equal, round, and reactive to light.   Cardiovascular:      Rate and Rhythm: Regular rhythm. Tachycardia present.      Pulses: Normal pulses.      Heart sounds: Normal heart sounds.   Pulmonary:      Effort: Pulmonary effort is normal.      Breath sounds: Normal breath sounds.   Abdominal:      General: Bowel sounds are normal. There is no distension.      Palpations: Abdomen is soft.   Musculoskeletal:      Cervical back: Normal range of motion and neck supple.      Comments: Left leg complaint of shooting throbbing pain from buttock down to toes.    Skin:     General: Skin is warm and dry.      Capillary Refill: Capillary refill takes less than 2 seconds.   Neurological:      General: No focal deficit present.      Mental Status: She is alert and oriented to person, place, and time.      Motor: Weakness present.   Psychiatric:         Mood and Affect: Mood is depressed.         Speech: Speech normal.         Behavior: Behavior normal. Behavior is cooperative.      Comments: Patient reports being lonely and depressed at home.          Result Review    Result Review:  I have personally reviewed the results from the time of this admission to 11/13/2021 22:26 EST and agree with these findings:  []  Laboratory  [x]  Microbiology  [x]  Radiology  [x]  EKG/Telemetry   [x]  Cardiology/Vascular   []  Pathology  [x]  Old records  []  Other:  Most notable findings include:   All labs unremarkable with the exception of elevated TSH four-point 2 0, slightly decreased RBCs 3.69, hemoglobin 10.6, hematocrit 31.9, RDW of 17.7, and digoxin level less than  0.30  Blood cultures are pending  Urinalysis unremarkable  Respiratory PCR negative for COVID-19..  Chest x-ray shows no acute cardiopulmonary abnormalities.  CT of the head, CTA of the head and neck stroke protocol  Shows no acute intracranial abnormalities evident Noncon CT head examination, mild volume loss and chronic microvascular ischemic changes, no hemodynamically significant stenosis or dissection within the vasculature of the neck, and no hemodynamically significant stenosis or branch vessel occlusion intracranially.  Read by Dr. Petros Villaseñor.  EKG-atrial fibrillation heart rate 116.    Echocardiogram from July 26, 2021 shows estimated LVEF 60% with normal left ventricular systolic function, moderate aortic valve regurgitation, estimated RVSP 34 tricuspid regurgitation is normal less than 35 mnhg    Assessment/Plan        Active Hospital Problems:  Active Hospital Problems    Diagnosis    • Stroke-like symptoms      Plan:   Stroke-like symptoms  -Neurology consulted in ED  -CTA head CTA head and neck completed  -Neurochecks  -Patient on home Eliquis for atrial fibrillation  -NIH assessments every shift  -Safety precautions  -MRI head ordered for a.m.  -PT, OT ordered per protocol  -Continuous cardiac monitoring and pulse oximetry  -Supplemental oxygen for hypoxia, respiratory distress to maintain oxygen saturation greater than 90%  -Encourage incentive spirometry and pulmonary toileting  -Glucose q. 6  -Case management consult for possible rehab placement.    Essential hypertension  -Chronic/partially controlled  -Vital signs per policy  -Continue home metoprolol     Chronic diastolic CHF (congestive heart failure)   -Chronic secondary to poorly get trolled hypertension  -Vital signs per policy  -Monitor I's and O's  -Daily weights  -Patient reports stopping furosemide  -Monitor BMP  -BMP ordered for a.m. with patient discontinuing use of furosemide.    Atrial fibrillation  -Continue home  digoxin  -Monitor digoxin  Level  -Patient on home Eliquis  -Continuous cardiac monitoring and pulse oximetry    Pulmonary hypertension  -Chronic secondary to poorly controlled hypertension  Continuous pulse oximetry and cardiac monitoring  -Continue home sildenafil     Stage II chronic kidney disease  -Monitor I's and O's  -Avoid nephrotoxic medications, hypovolemia, hypotension  -Consider nephrology consult if patient kidney function deteriorates    Hypothyroidism  -Check TSH  -Continue home levothyroxine     Neuropathic pain/lumbar radiculopathy/chronic back pain  -Continue home Lidoderm patch, Norco, Flexeril and gabapentin  -Monitor respiratory status for oversedation  -Use pulse oximetry      Restless legs syndrome  -Continue home Requip     Gastroesophageal reflux disease  -Continue home PPI    Insomnia,Depressive disorder, generalized anxiety disorder  -Continue home Zoloft, trazodone  -Continuous pulse oximetry  -Monitor for oversedation  -Patient reports being lonely at home,  -Consider psych consult if clinically appropriate.    Obesity  -BMI 30.04  -Encourage healthy lifestyle and dietary modifications to reduce cardiovascular risk factors and healthy weight management.      DVT prophylaxis:  No DVT prophylaxis order currently exists.    CODE STATUS:       Admission Status:  I believe this patient meets observation status.    I discussed the patient's findings and my recommendations with patient.    This patient has been examined wearing appropriate Personal Protective Equipment a. 11/13/21      Signature: Electronically signed by KALYANI Rojo, 11/13/21, 10:26 PM EST.

## 2021-11-14 NOTE — PROGRESS NOTES
[facility] Medicine Services Daily Progress Note    Patient Name: Catalina Moreno  : 1940  MRN: [id number]  Primary Care Physician:  Anayeli Costa APRN  Date of admission: 2021      Subjective      Chief Complaint:       Patient Reports    21: Complains of left leg pain.  Denies recent trauma.  Afebrile.  MRI of the brain ordered.    Review of Systems   All other systems reviewed and are negative.         Objective      Vitals:   Temp:  [97 °F (36.1 °C)-98.5 °F (36.9 °C)] 98.5 °F (36.9 °C)  Heart Rate:  [] 97  Resp:  [13-18] 16  BP: ()/(37-91) 96/53    Physical Exam  HENT:      Head: Normocephalic.      Nose: Nose normal.   Eyes:      Extraocular Movements: Extraocular movements intact.      Pupils: Pupils are equal, round, and reactive to light.   Cardiovascular:      Rate and Rhythm: Normal rate and regular rhythm.      Pulses: Normal pulses.   Pulmonary:      Effort: Pulmonary effort is normal.   Abdominal:      General: Bowel sounds are normal.   Musculoskeletal:      Cervical back: Normal range of motion.   Skin:     General: Skin is warm.   Neurological:      Mental Status: She is alert and oriented to person, place, and time. Mental status is at baseline.   Psychiatric:         Mood and Affect: Mood normal.               Result Review    Result Review:  I have personally reviewed the results from the time of this admission to 2021 13:50 EST and agree with these findings:  [x]  Laboratory  []  Microbiology  [x]  Radiology  []  EKG/Telemetry   []  Cardiology/Vascular   []  Pathology  [x]  Old records  []  Other:            Assessment/Plan      Brief Patient Summary:  81 years old female with left-sided weakness and complaints of lower back pain that radiates to left leg.      apixaban, 5 mg, Oral, Q12H  atorvastatin, 80 mg, Oral, Nightly  clopidogrel, 75 mg, Oral, Daily  digoxin, 125 mcg, Oral, Daily  famotidine, 20 mg, Oral, Daily  gabapentin,  600 mg, Oral, Q12H  levothyroxine, 75 mcg, Oral, Daily  metoprolol succinate XL, 50 mg, Oral, Q24H  rOPINIRole, 0.25 mg, Oral, Nightly  sertraline, 50 mg, Oral, Nightly  sildenafil, 20 mg, Oral, BID  traZODone, 25 mg, Oral, Nightly       niCARdipine, 5-15 mg/hr, Last Rate: Stopped (11/13/21 2244)         Active Hospital Problems:  Active Hospital Problems    Diagnosis    • **Stroke-like symptoms    • Chronic diastolic CHF (congestive heart failure) (HCC)    • Essential (primary) hypertension    • Paroxysmal atrial fibrillation (HCC)    • Restless legs syndrome    • Generalized anxiety disorder    • Polypharmacy    • Stage 2 chronic kidney disease    • Atrial fibrillation (HCC)    • Chronic low back pain    • Neuropathic pain    • Gastroesophageal reflux disease    • Essential hypertension    • Hypothyroidism    • Depressive disorder      Left-sided weakness and left leg pain:  -Placed on observation  -s/p CT head w/o and CT head/neck--> acute intracranial pathology ruled out  -MRI of the brain ordered  -Continue neurological checks  -PT/OT ordered    Acute on chronic low back pain:  -Continue home Lidoderm patch, Norco, Flexeril and gabapentin    Atrial fibrillation RVR:  -Restart home digoxin and Toprol  -Continue home Eliquis    Hypertension::  -Continue home Toprol    Chronic diastolic heart failure:  -Daily weights  -TTE 7/26/2021--> LVEF 59%.  No valvular dysfunction    Pulmonary hypertension  -Continue home sildenafil      Stage II chronic kidney disease  -Nephrotoxin home     Hypothyroidism  -Continue home levothyroxine     Restless legs syndrome  -Continue home Requip      Gastroesophageal reflux disease  -Continue home PPI     Depressive disorder/generalized anxiety disorder  -Continue home Zoloft, trazodone        DVT prophylaxis:  Medical and mechanical DVT prophylaxis orders are present.    CODE STATUS:    Code Status (Patient has no pulse and is not breathing): CPR (Attempt to Resuscitate)  Medical  Interventions (Patient has pulse or is breathing): Full Support      Disposition:  I expect patient to be discharged defer.    This patient has been examined wearing appropriate Personal Protective Equipment and discussed with nursing. 11/14/21      Electronically signed by Tom Saenz DO, 11/14/21, 13:50 EST.  [facility] Hospitalist Team

## 2021-11-14 NOTE — PLAN OF CARE
Goal Outcome Evaluation:  Plan of Care Reviewed With: patient        Progress: improving  Outcome Summary: pt improving. Feels better today. MRI results not back yet. Vitals stable.

## 2021-11-14 NOTE — PLAN OF CARE
Goal Outcome Evaluation:  Plan of Care Reviewed With: patient           Outcome Summary: 82 yo female admitted with confusion and L side weakness that has resolved.  Pt lives alone, reports son helps some when needed.  Pt functions in her home using a rollator.  Pt appears near baseline, mild decreased activity tolerance with reported exacerbation of LLE sciatica.  Pt safe for return home with HHPT at d/c. Will follow 3x/week until discharge.

## 2021-11-14 NOTE — THERAPY EVALUATION
Patient Name: Catalina Moreno  : 1940    MRN: [id number]                              Today's Date: 2021       Admit Date: 2021    Visit Dx:     ICD-10-CM ICD-9-CM   1. Stroke-like symptoms  R29.90 781.99   2. Left-sided weakness  R53.1 728.87     Patient Active Problem List   Diagnosis   • Arthritis   • Neuropathic pain   • Other long term (current) drug therapy   • Polyarthralgia   • Sacroiliitis ([facility])   • Depressive disorder   • Primary fibromyalgia syndrome   • Gastroesophageal reflux disease   • Essential hypertension   • Lightheadedness   • Hypothyroidism   • Lumbar radiculopathy   • Altered mental status   • Acute on chronic diastolic congestive heart failure ([facility])   • Nonrheumatic tricuspid valve regurgitation   • Atrial fibrillation ([facility])   • Pulmonary hypertension ([facility])   • Acquired spondylolisthesis of lumbosacral region   • Spondylolisthesis, lumbar region   • Vitamin D deficiency   • Stage 2 chronic kidney disease   • DDD (degenerative disc disease), cervical   • Chronic pain   • Chronic low back pain   • Cervical stenosis of spinal canal   • Anemia   • Polypharmacy   • Stroke-like symptoms   • Age-related cognitive decline   • Edema, unspecified   • Generalized anxiety disorder   • History of falling   • Insomnia, unspecified   • Major depressive disorder, single episode, unspecified   • Need for assistance with personal care   • Other dysphagia   • Polyneuropathy, unspecified   • Unsteadiness on feet   • Restless legs syndrome   • Vomiting, unspecified   • Weakness   • Acute on chronic diastolic (congestive) heart failure ([facility])   • Unspecified abdominal pain   • Paroxysmal atrial fibrillation ([facility])   • Chronic pain disorder   • Other intervertebral disc degeneration, lumbar region   • Essential (primary) hypertension   • Spondylolisthesis, lumbar region   • Hypothyroidism, unspecified   • Gastro-esophageal reflux disease without esophagitis   • Pulmonary hypertension, unspecified  ([facility])   • Chronic diastolic CHF (congestive heart failure) ([facility])     Past Medical History:   Diagnosis Date   • Anxiety    • Arthritis    • Atrial fibrillation (CMS/HCC)     paroxysmal   • Broken shoulder     left-- due to fall 11-7-19 was at [facility]   • Buttock pain     rt side   • DDD (degenerative disc disease), lumbar    • Depression    • Edema     9/2020 foot   • GERD (gastroesophageal reflux disease)    • H/O fall    • Hip pain     rt   • Hypertension    • Hypothyroidism    • Insomnia    • Leg pain     rt   • Low back pain    • Neuropathy    • PONV (postoperative nausea and vomiting)    • Pulmonary hypertension (CMS/HCC)    • Radiculopathy    • Restless legs    • Spondylolisthesis    • Urinary incontinence      Past Surgical History:   Procedure Laterality Date   • BACK SURGERY  07/19/2018    PDC &  PSF L3-L5 insitu   • CARDIAC CATHETERIZATION N/A 4/2/2021    Procedure: Cardiac Catheterization/Vascular Study;  Surgeon: Barrett Evans MD;  Location: [facility] CATH INVASIVE LOCATION;  Service: Cardiovascular;  Laterality: N/A;   • CHOLECYSTECTOMY     • COLONOSCOPY     • HYSTERECTOMY     • ROTATOR CUFF REPAIR Left       General Information     [facility] Name 11/14/21 1118          Physical Therapy Time and Intention    Document Type evaluation  -     Mode of Treatment physical therapy  -     Row Name 11/14/21 1229 11/14/21 1118       General Information    Patient Profile Reviewed -- yes  -    Prior Level of Function independent:; ADL's; all household mobility  using rollator  - --    Barriers to Rehab none identified  - --    Row Name 11/14/21 1229          Living Environment    Lives With alone  -     Row Name 11/14/21 1229          Home Main Entrance    Number of Stairs, Main Entrance none  -     Row Name 11/14/21 1229          Stairs Within Home, Primary    Number of Stairs, Within Home, Primary none  -[facility] Name 11/14/21 1229          Cognition    Orientation Status (Cognition) oriented  x 4  -     Row Name 11/14/21 1229          Safety Issues, Functional Mobility    Impairments Affecting Function (Mobility) endurance/activity tolerance; balance; pain  -           User Key  (r) = Recorded By, (t) = Taken By, (c) = Cosigned By    Initials Name Provider Type    Marzena Knapp, PT Physical Therapist               Mobility     Row Name 11/14/21 1234          Bed Mobility    Bed Mobility supine-sit  -     Supine-Sit [place] (Bed Mobility) modified independence  -     Assistive Device (Bed Mobility) head of bed elevated  -     Row Name 11/14/21 1234          Bed-Chair Transfer    Bed-Chair [place] (Transfers) supervision  -     Row Name 11/14/21 1234          Sit-Stand Transfer    Sit-Stand [place] (Transfers) supervision  -     Row Name 11/14/21 1234          Gait/Stairs (Locomotion)    [place] Level (Gait) contact guard  -     Assistive Device (Gait) walker, front-wheeled  -     Distance in Feet (Gait) 150'  -     Deviations/Abnormal Patterns (Gait) gait speed decreased  -     Comment (Gait/Stairs) pt does well using RW, states her knees bother her and feel weak at times.  -           User Key  (r) = Recorded By, (t) = Taken By, (c) = Cosigned By    Initials Name Provider Type    Marzena Knapp PT Physical Therapist               Obj/Interventions     Row Name 11/14/21 1236          Range of Motion Comprehensive    General Range of Motion bilateral lower extremity ROM WFL  -     Comment, General Range of Motion reports L side sciatic pain, recent exacerbation and typically goes to pain mgmt for treatment  -     Row Name 11/14/21 1236          Strength Comprehensive (MMT)    Comment, General Manual Muscle Testing (MMT) Assessment BLEs hips 4/5, other 5/5  -     Row Name 11/14/21 1236          Balance    Balance Assessment sitting static balance; sitting dynamic balance; standing static balance; standing dynamic balance  -     Static Sitting Balance  WNL  -JH     Dynamic Sitting Balance WNL  -JH     Static Standing Balance WFL  -JH     Dynamic Standing Balance WFL; supported  -[facility] Name 11/14/21 1236          Sensory Assessment (Somatosensory)    Sensory Assessment (Somatosensory) --  reports baseline N/T in B feet  -           User Key  (r) = Recorded By, (t) = Taken By, (c) = Cosigned By    Initials Name Provider Type     Marzena Mauro, PT Physical Therapist               Goals/Plan     Row Name 11/14/21 1241          Bed Mobility Goal 1 (PT)    Activity/Assistive Device (Bed Mobility Goal 1, PT) bed mobility activities, all  -JH     [place] Level/Cues Needed (Bed Mobility Goal 1, PT) independent  -JH     Time Frame (Bed Mobility Goal 1, PT) short term goal (STG); 1 week  -[facility] Name 11/14/21 1241          Transfer Goal 1 (PT)    Activity/Assistive Device (Transfer Goal 1, PT) sit-to-stand/stand-to-sit; bed-to-chair/chair-to-bed; walker, rolling  -JH     [place] Level/Cues Needed (Transfer Goal 1, PT) modified independence  -JH     Time Frame (Transfer Goal 1, PT) short term goal (STG); 1 week  -[facility] Name 11/14/21 1241          Gait Training Goal 1 (PT)    Activity/Assistive Device (Gait Training Goal 1, PT) gait (walking locomotion); assistive device use; walker, rolling  -JH     [place] Level (Gait Training Goal 1, PT) modified independence  -JH     Distance (Gait Training Goal 1, PT) 200'  -JH     Time Frame (Gait Training Goal 1, PT) short term goal (STG); 1 week  -           User Key  (r) = Recorded By, (t) = Taken By, (c) = Cosigned By    Initials Name Provider Type     Marzena Mauro, PT Physical Therapist               Clinical Impression     [facility] Name 11/14/21 1238          Pain    Additional Documentation Pain Scale: FACES Pre/Post-Treatment (Group)  -JH     Row Name 11/14/21 1238          Pain Scale: FACES Pre/Post-Treatment    Pain: FACES Scale, Pretreatment 2-->hurts little bit  -     Posttreatment Pain  Rating 2-->hurts little bit  -     Pain Location - Side Left  -     Pain Location - Orientation lower  -     Pain Location extremity  -[facility] Name 11/14/21 1238          Plan of Care Review    Plan of Care Reviewed With patient  -     Outcome Summary 80 yo female admitted with confusion and L side weakness that has resolved.  Pt lives alone, reports son helps some when needed.  Pt functions in her home using a rollator.  Pt appears near baseline, mild decreased activity tolerance with reported exacerbation of LLE sciatica.  Pt safe for return home with HHPT at d/c. Will follow 3x/week until discharge.  -[facility] Name 11/14/21 1238          Therapy Assessment/Plan (PT)    Rehab Potential (PT) good, to achieve stated therapy goals  -     Criteria for Skilled Interventions Met (PT) yes; skilled treatment is necessary  -[facility] Name 11/14/21 1238          Vital Signs    Intra Systolic BP Rehab 121  -     Intra Treatment Diastolic BP 51  sitting EOB  -     Intratreatment Heart Rate (beats/min) 146  when ambulating  -     Posttreatment Heart Rate (beats/min) 133  -     O2 Delivery Pre Treatment room air  -     O2 Delivery Intra Treatment room air  -     O2 Delivery Post Treatment room air  -[facility] Name 11/14/21 1238          Positioning and Restraints    Pre-Treatment Position in bed  -     Post Treatment Position chair  -     In Chair notified nsg; sitting; call light within reach; encouraged to call for assist  -           User Key  (r) = Recorded By, (t) = Taken By, (c) = Cosigned By    Initials Name Provider Type     Marzena Mauro, PT Physical Therapist               Outcome Measures     [facility] Name 11/14/21 1242          Modified [place] Scale    Modified Derek Scale 4 - Moderately severe disability.  Unable to walk without assistance, and unable to attend to own bodily needs without assistance.  -[facility] Name 11/14/21 1242          Functional Assessment    Outcome Measure  Options Modified Derek  -           User Key  (r) = Recorded By, (t) = Taken By, (c) = Cosigned By    Initials Name Provider Type     Marzena Mauro PT Physical Therapist                             Physical Therapy Education                 Title: PT OT SLP Therapies (Done)     Topic: Physical Therapy (Done)     Point: Mobility training (Done)     Learning Progress Summary           Patient Acceptance, E,TB, VU by  at 11/14/2021 1242                   Point: Precautions (Done)     Learning Progress Summary           Patient Acceptance, E,TB, VU by  at 11/14/2021 1242                               User Key     Initials Effective Dates Name Provider Type [facility] 06/16/21 -  Marzena Mauro PT Physical Therapist PT              PT Recommendation and Plan  Planned Therapy Interventions (PT): balance training, bed mobility training, gait training, transfer training, strengthening, patient/family education  Plan of Care Reviewed With: patient  Outcome Summary: 80 yo female admitted with confusion and L side weakness that has resolved.  Pt lives alone, reports son helps some when needed.  Pt functions in her home using a rollator.  Pt appears near baseline, mild decreased activity tolerance with reported exacerbation of LLE sciatica.  Pt safe for return home with HHPT at d/c. Will follow 3x/week until discharge.     Time Calculation:    PT Charges     Row Name 11/14/21 1243             Time Calculation    Start Time 0905  -      Stop Time 0922  -      Time Calculation (min) 17 min  -      PT Received On 11/14/21  -      PT - Next Appointment 11/16/21  -      PT Goal Re-Cert Due Date 11/28/21  -              Time Calculation- PT    Total Timed Code Minutes- PT 0 minute(s)  -            User Key  (r) = Recorded By, (t) = Taken By, (c) = Cosigned By    Initials Name Provider Type     Marzena Mauro PT Physical Therapist              Therapy Charges for Today     Code Description Service Date  Service Provider Modifiers Qty    [id number]  PT EVAL LOW COMPLEXITY 3 11/14/2021 Marzena Mauro, PT GP 1          PT G-Codes  Outcome Measure Options: Modified Derek  Modified [place] Scale: 4 - Moderately severe disability.  Unable to walk without assistance, and unable to attend to own bodily needs without assistance.    Marzena Mauro, PT  11/14/2021

## 2021-11-14 NOTE — CASE MANAGEMENT/SOCIAL WORK
Continued Stay Note  KADEEM Rios     Patient Name: Catalina Moreno  MRN: [id number]  Today's Date: 11/14/2021    Admit Date: 11/13/2021     Discharge Plan     Row Name 11/14/21 1453       Plan    Plan home with [facility]- accepted    Plan Comments Pt recommend HHPT, pt selected [facility].  referral sent and accepted.                    Reentta Odell RN

## 2021-11-14 NOTE — DISCHARGE PLACEMENT REQUEST
"Catalina Carrera (81 y.o. Female)             Date of Birth Social Security Number Address Home Phone MRN    1940  [address] [address] [phone] [id number]    [facility] Mar[facility] Status             None        Admission Date Admission Type Admitting Provider Attending Provider Department, Room/Bed    21 Emergency Tom Saenz, Tom Man DO [facility] NEURO HEART, 262/    Discharge Date Discharge Disposition Discharge Destination                         Attending Provider: Tom Saenz DO    Allergies: Baclofen, Codeine, Ibuprofen, Methocarbamol, Naproxen, Tizanidine Hcl, Tolmetin    Isolation: None   Infection: COVID (rule out) (21)   Code Status: CPR   Advance Care Planning Activity    Ht: 162.6 cm (64\")   Wt: 79.4 kg (175 lb)    Admission Cmt: None   Principal Problem: Stroke-like symptoms [R29.90]                 Active Insurance as of 2021     Primary Coverage     Payor Plan Insurance Group Employer/Plan Group    HUMANA MEDICARE REPLACEMENT HUMANA MEDICARE REPLACEMENT [id number]     Payor Plan Address Payor Plan Phone Number Payor Plan Fax Number Effective Dates    [address] [address] [phone]  2019 - None Entered    [facility] [address]       Subscriber Name Subscriber Birth Date Member ID       CATALINA CARRERA 1940 [id number]                 Emergency Contacts      (Rel.) Home Phone Work Phone Mobile Phone    MADHAV DEAN (Son) [phone] -- [phone]               History & Physical      Kaity Slaughter APRN at 21 2226              [facility] Medicine Services      Patient Name: Catalina Carrera  : 1940  MRN: [id number]  Primary Care Physician:  Anayeli Costa APRN  Date of admission: 2021      Subjective      Chief Complaint: Confusion, left side weekness     History of Present Illness: Catalina Carrera is a 81 y.o. female " who [facility] complaining of confusion and dysphagia that started 3-4 days ago and left sided weakness she reports started last night November 12,2021.  Patient states that currently she has no signs and symptoms of confusion, decreased concentration, or fatigue.  Patient states she has left lower leg pain that she rates as a 10 out of 10 that starts at her buttocks and radiates down her leg she describes the pain as a constant throbbing toothache with no exacerbating or eliminating factors.  Patient is poor historian unclear of accurate information.  She denies chest pain, shortness of breath, diaphoresis, palpitation chills, fever, dizziness, lightheadedness, headache, vision changes, nausea, vomiting, constipation, or  complaints.  Patient does admit to chronic diarrhea, and chronic back and leg pain.    At time of assessment patient's NIH is 0 she is alert and oriented x3, she has no noted focal deficits.  Patient reports she is depressed and lonely at home, patient states her loneliness is possibly wh while she was having trouble concentrating.      In the emergency room today patient received 5-325 mg hydrocodone, 2 mg morphine, underwent CT scan of head, chest x-ray, EKG, and lab assessment.      Review of Systems   Constitutional: Positive for malaise/fatigue.   HENT: Negative.    Eyes: Negative.    Cardiovascular: Negative.    Respiratory: Negative.    Endocrine: Negative.    Skin: Negative.    Musculoskeletal: Positive for joint pain and muscle weakness.        Leg pain 10/10    Gastrointestinal: Negative.    Genitourinary: Negative.    Neurological: Positive for difficulty with concentration and weakness.   Psychiatric/Behavioral: Positive for depression.   All other systems reviewed and are negative.       Personal History     Past Medical History:   Diagnosis Date   • Anxiety    • Arthritis    • Atrial fibrillation (CMS/HCC)     paroxysmal   • Broken shoulder      left-- due to fall 11-7-19 was at [facility]   • Buttock pain     rt side   • DDD (degenerative disc disease), lumbar    • Depression    • Edema     9/2020 foot   • GERD (gastroesophageal reflux disease)    • H/O fall    • Hip pain     rt   • Hypertension    • Hypothyroidism    • Insomnia    • Leg pain     rt   • Low back pain    • Neuropathy    • PONV (postoperative nausea and vomiting)    • Pulmonary hypertension (CMS/HCC)    • Radiculopathy    • Restless legs    • Spondylolisthesis    • Urinary incontinence        Past Surgical History:   Procedure Laterality Date   • BACK SURGERY  07/19/2018    PDC &  PSF L3-L5 insitu   • CARDIAC CATHETERIZATION N/A 4/2/2021    Procedure: Cardiac Catheterization/Vascular Study;  Surgeon: Barrett Evans MD;  Location: [facility] INVASIVE LOCATION;  Service: Cardiovascular;  Laterality: N/A;   • CHOLECYSTECTOMY     • COLONOSCOPY     • HYSTERECTOMY     • ROTATOR CUFF REPAIR Left        Family History: family history includes Cancer in her father, paternal aunt, and paternal uncle; Diabetes in her son; Heart disease in her paternal aunt. Otherwise pertinent FHx was reviewed and not pertinent to current issue.    Social History:  reports that she has never smoked. She has never used smokeless tobacco. She reports that she does not drink alcohol and does not use drugs.    Home Medications:  Prior to Admission Medications     Prescriptions Last Dose Informant Patient Reported? Taking?    apixaban (ELIQUIS) 5 MG tablet tablet   No No    Take 1 tablet by mouth Every 12 (Twelve) Hours.    cyclobenzaprine (FLEXERIL) 5 MG tablet   No No    Take 1 tablet by mouth 2 (Two) Times a Day As Needed for Muscle Spasms.    digoxin (LANOXIN) 125 MCG tablet   No No    Take 1 tablet by mouth Daily.    famotidine (PEPCID) 20 MG tablet   Yes No    Take 20 mg by mouth Daily.    furosemide (LASIX) 40 MG tablet   Yes No    Take 40 mg by mouth 2 (Two) Times a Day. Uses for leg swelling    gabapentin  (NEURONTIN) 600 MG tablet   No No    TAKE 1 TABLET BY MOUTH FOUR TIMES DAILY    HYDROcodone-acetaminophen (NORCO) 7.5-325 MG per tablet   No No    Take 1 tablet by mouth 4 (Four) Times a Day As Needed for Severe Pain . DNF before 10/12/2021    levothyroxine (SYNTHROID, LEVOTHROID) 75 MCG tablet   Yes No    Take 75 mcg by mouth Daily. Take preop    lidocaine (LIDODERM) 5 %   Yes No    Place 1 patch on the skin as directed by provider Daily As Needed for Mild Pain . Remove & Discard patch within 12 hours or as directed by MD    metoprolol succinate XL (TOPROL-XL) 50 MG 24 hr tablet   No No    TAKE 1 TABLET BY MOUTH DAILY. CALL MEDICAL DOCTOR OFFICE TO MAKE AND KEEP APPOINTMENT FOR FURTHER REFILLS    pantoprazole (PROTONIX) 40 MG EC tablet   Yes No    Take 40 mg by mouth Daily With Lunch. Afternoons    rOPINIRole (REQUIP) 0.25 MG tablet   No No    Take 1 tablet by mouth Every Night. Take 1 hour before bedtime    sertraline (ZOLOFT) 50 MG tablet   Yes No    Take 50 mg by mouth Every Night.    sildenafil (REVATIO) 20 MG tablet   Yes No    Take 20 mg by mouth 2 (Two) Times a Day.    traZODone (DESYREL) 50 MG tablet   Yes No    Take 25 mg by mouth Every Night.            Allergies:  Allergies   Allergen Reactions   • Baclofen Rash   • Codeine Nausea Only   • Ibuprofen Unknown (See Comments)     Patient doesn't know----Motin   • Methocarbamol Unknown (See Comments)     Patient doesn't know   • Naproxen Unknown (See Comments)     Pt. Doesn't know    • Tizanidine Hcl Hallucinations   • Tolmetin Dizziness     Pt. Doesn't know-- same as Tolectin       Objective      Vitals:   Temp:  [98.1 °F (36.7 °C)] 98.1 °F (36.7 °C)  Heart Rate:  [101-141] 141  Resp:  [17-18] 17  BP: (113-159)/(61-91) 143/76    Physical Exam  Vitals and nursing note reviewed.   Constitutional:       General: She is not in acute distress.  HENT:      Head: Normocephalic and atraumatic.      Nose: Nose normal.      Mouth/Throat:      Mouth: Mucous membranes  are dry.   Eyes:      Pupils: Pupils are equal, round, and reactive to light.   Cardiovascular:      Rate and Rhythm: Regular rhythm. Tachycardia present.      Pulses: Normal pulses.      Heart sounds: Normal heart sounds.   Pulmonary:      Effort: Pulmonary effort is normal.      Breath sounds: Normal breath sounds.   Abdominal:      General: Bowel sounds are normal. There is no distension.      Palpations: Abdomen is soft.   Musculoskeletal:      Cervical back: Normal range of motion and neck supple.      Comments: Left leg complaint of shooting throbbing pain from buttock down to toes.    Skin:     General: Skin is warm and dry.      Capillary Refill: Capillary refill takes less than 2 seconds.   Neurological:      General: No focal deficit present.      Mental Status: She is alert and oriented to person, place, and time.      Motor: Weakness present.   Psychiatric:         Mood and Affect: Mood is depressed.         Speech: Speech normal.         Behavior: Behavior normal. Behavior is cooperative.      Comments: Patient reports being lonely and depressed at home.          Result Review    Result Review:  I have personally reviewed the results from the time of this admission to 11/13/2021 22:26 EST and agree with these findings:  []  Laboratory  [x]  Microbiology  [x]  Radiology  [x]  EKG/Telemetry   [x]  Cardiology/Vascular   []  Pathology  [x]  Old records  []  Other:  Most notable findings include:   All labs unremarkable with the exception of elevated TSH four-point 2 0, slightly decreased RBCs 3.69, hemoglobin 10.6, hematocrit 31.9, RDW of 17.7, and digoxin level less than 0.30  Blood cultures are pending  Urinalysis unremarkable  Respiratory PCR negative for COVID-19..  Chest x-ray shows no acute cardiopulmonary abnormalities.  CT of the head, CTA of the head and neck stroke protocol  Shows no acute intracranial abnormalities evident Noncon CT head examination, mild volume loss and chronic microvascular  ischemic changes, no hemodynamically significant stenosis or dissection within the vasculature of the neck, and no hemodynamically significant stenosis or branch vessel occlusion intracranially.  Read by Dr. Petros Villaseñor.  EKG-atrial fibrillation heart rate 116.    Echocardiogram from July 26, 2021 shows estimated LVEF 60% with normal left ventricular systolic function, moderate aortic valve regurgitation, estimated RVSP 34 tricuspid regurgitation is normal less than 35 mnhg    Assessment/Plan        Active Hospital Problems:  Active Hospital Problems    Diagnosis    • Stroke-like symptoms      Plan:   Stroke-like symptoms  -Neurology consulted in ED  -CTA head CTA head and neck completed  -Neurochecks  -Patient on home Eliquis for atrial fibrillation  -NIH assessments every shift  -Safety precautions  -MRI head ordered for a.m.  -PT, OT ordered per protocol  -Continuous cardiac monitoring and pulse oximetry  -Supplemental oxygen for hypoxia, respiratory distress to maintain oxygen saturation greater than 90%  -Encourage incentive spirometry and pulmonary toileting  -Glucose q. 6  -Case management consult for possible rehab placement.    Essential hypertension  -Chronic/partially controlled  -Vital signs per policy  -Continue home metoprolol     Chronic diastolic CHF (congestive heart failure)   -Chronic secondary to poorly get trolled hypertension  -Vital signs per policy  -Monitor I's and O's  -Daily weights  -Patient reports stopping furosemide  -Monitor BMP  -BMP ordered for a.m. with patient discontinuing use of furosemide.    Atrial fibrillation  -Continue home digoxin  -Monitor digoxin  Level  -Patient on home Eliquis  -Continuous cardiac monitoring and pulse oximetry    Pulmonary hypertension  -Chronic secondary to poorly controlled hypertension  Continuous pulse oximetry and cardiac monitoring  -Continue home sildenafil     Stage II chronic kidney disease  -Monitor I's and O's  -Avoid nephrotoxic  medications, hypovolemia, hypotension  -Consider nephrology consult if patient kidney function deteriorates    Hypothyroidism  -Check TSH  -Continue home levothyroxine     Neuropathic pain/lumbar radiculopathy/chronic back pain  -Continue home Lidoderm patch, Norco, Flexeril and gabapentin  -Monitor respiratory status for oversedation  -Use pulse oximetry      Restless legs syndrome  -Continue home Requip     Gastroesophageal reflux disease  -Continue home PPI    Insomnia,Depressive disorder, generalized anxiety disorder  -Continue home Zoloft, trazodone  -Continuous pulse oximetry  -Monitor for oversedation  -Patient reports being lonely at home,  -Consider psych consult if clinically appropriate.    Obesity  -BMI 30.04  -Encourage healthy lifestyle and dietary modifications to reduce cardiovascular risk factors and healthy weight management.      DVT prophylaxis:  No DVT prophylaxis order currently exists.    CODE STATUS:       Admission Status:  I believe this patient meets observation status.    I discussed the patient's findings and my recommendations with patient.    This patient has been examined wearing appropriate Personal Protective Equipment a. 21      Signature: Electronically signed by KALYANI Rojo, 21, 10:26 PM EST.      Electronically signed by Kaity Slaughter APRN at 21 0121       Physical Therapy Notes (last 24 hours)  Notes from 21 1234 through 21 1234   No notes exist for this encounter.         Occupational Therapy Notes (last 24 hours)  Notes from 21 1234 through 21 1234   No notes exist for this encounter.       [facility] NEURO HEART  [address] [facility] [place] [address]  Phone:  [phone]  Fax:  [phone] Date: 2021      Ambulatory Referral to Home Health     Patient:  Catalina Moreno MRN:  [id number]   [address] [facility] [place] [address] :  1940  SSN:    Phone: [phone] Sex:  MIKE       INSURANCE PAYOR PLAN GROUP # SUBSCRIBER ID   Primary:    HUMANA MEDICARE REPLACEMENT [id number]      Referring Provider Information:  TOM DEAL Phone: [phone] Fax: [phone]      Referral Information:   # Visits:  999 Referral Type: Home Health [42]   Urgency:  Routine Referral Reason: Specialty Services Required   Start Date: Nov 14, 2021 End Date:  To be determined by Insurer   Diagnosis: Polyarthralgia (M25.50 [ICD-10-CM] 719.49 [ICD-9-CM])  Primary fibromyalgia syndrome (M79.7 [ICD-10-CM] 729.1 [ICD-9-CM])  Atrial fibrillation, unspecified type (HCC) (I48.91 [ICD-10-CM] 427.31 [ICD-9-CM])  Acute on chronic diastolic (congestive) heart failure (HCC) (I50.33 [ICD-10-CM] 428.33,428.0 [ICD-9-CM])      Refer to Dept:   Refer to Provider:   Refer to Facility:       Face to Face Visit Date: 11/14/2021  Follow-up provider for Plan of Care? I treated the patient in an acute care facility and will not continue treatment after discharge.  Follow-up provider: CATALINO KING [[id number]]  Reason/Clinical Findings: post hospital eval  Describe mobility limitations that make leaving home difficult: impaired mobility  Nursing/Therapeutic Services Requested: Physical Therapy  PT orders: Home safety assessment  PT orders: Strengthening  Frequency: 1 Week 1     This document serves as a request of services and does not constitute Insurance authorization or approval of services.  To determine eligibility, please contact the members Insurance carrier to verify and review coverage.     If you have medical questions regarding this request for services. Please contact [facility] NEURO HEART at [phone] during normal business hours.         Verbal Order Mode: Telephone with readback   Authorizing Provider: Tom Deal DO  Authorizing Provider's NPI: [id number]     Order Entered By: Renetta Odell RN 11/14/2021  1:00 PM

## 2021-11-15 ENCOUNTER — HOME HEALTH ADMISSION (OUTPATIENT)
Dept: HOME HEALTH SERVICES | Facility: HOME HEALTHCARE | Age: 81
End: 2021-11-15

## 2021-11-15 PROBLEM — I48.91 ATRIAL FIBRILLATION WITH RAPID VENTRICULAR RESPONSE: Status: ACTIVE | Noted: 2021-11-15

## 2021-11-15 LAB
ANION GAP SERPL CALCULATED.3IONS-SCNC: 10 MMOL/L (ref 5–15)
BASOPHILS # BLD AUTO: 0.1 10*3/MM3 (ref 0–0.2)
BASOPHILS NFR BLD AUTO: 1.4 % (ref 0–1.5)
BUN SERPL-MCNC: 19 MG/DL (ref 8–23)
BUN/CREAT SERPL: 28.4 (ref 7–25)
CALCIUM SPEC-SCNC: 8.7 MG/DL (ref 8.6–10.5)
CHLORIDE SERPL-SCNC: 105 MMOL/L (ref 98–107)
CO2 SERPL-SCNC: 25 MMOL/L (ref 22–29)
CREAT SERPL-MCNC: 0.67 MG/DL (ref 0.57–1)
DEPRECATED RDW RBC AUTO: 54.3 FL (ref 37–54)
EOSINOPHIL # BLD AUTO: 0.3 10*3/MM3 (ref 0–0.4)
EOSINOPHIL NFR BLD AUTO: 5 % (ref 0.3–6.2)
ERYTHROCYTE [DISTWIDTH] IN BLOOD BY AUTOMATED COUNT: 17.7 % (ref 12.3–15.4)
GFR SERPL CREATININE-BSD FRML MDRD: 85 ML/MIN/1.73
GLUCOSE BLDC GLUCOMTR-MCNC: 116 MG/DL (ref 70–105)
GLUCOSE SERPL-MCNC: 102 MG/DL (ref 65–99)
HBA1C MFR BLD: 6 % (ref 3.5–5.6)
HCT VFR BLD AUTO: 32.1 % (ref 34–46.6)
HGB BLD-MCNC: 10.6 G/DL (ref 12–15.9)
LYMPHOCYTES # BLD AUTO: 2.7 10*3/MM3 (ref 0.7–3.1)
LYMPHOCYTES NFR BLD AUTO: 47.4 % (ref 19.6–45.3)
MAGNESIUM SERPL-MCNC: 2.3 MG/DL (ref 1.6–2.4)
MCH RBC QN AUTO: 28.5 PG (ref 26.6–33)
MCHC RBC AUTO-ENTMCNC: 33 G/DL (ref 31.5–35.7)
MCV RBC AUTO: 86.3 FL (ref 79–97)
MONOCYTES # BLD AUTO: 0.5 10*3/MM3 (ref 0.1–0.9)
MONOCYTES NFR BLD AUTO: 9.5 % (ref 5–12)
NEUTROPHILS NFR BLD AUTO: 2.1 10*3/MM3 (ref 1.7–7)
NEUTROPHILS NFR BLD AUTO: 36.7 % (ref 42.7–76)
NRBC BLD AUTO-RTO: 0.1 /100 WBC (ref 0–0.2)
PLATELET # BLD AUTO: 227 10*3/MM3 (ref 140–450)
PMV BLD AUTO: 7.9 FL (ref 6–12)
POTASSIUM SERPL-SCNC: 4.4 MMOL/L (ref 3.5–5.2)
RBC # BLD AUTO: 3.72 10*6/MM3 (ref 3.77–5.28)
SODIUM SERPL-SCNC: 140 MMOL/L (ref 136–145)
VIT B12 BLD-MCNC: 343 PG/ML (ref 211–946)
WBC # BLD AUTO: 5.7 10*3/MM3 (ref 3.4–10.8)

## 2021-11-15 PROCEDURE — 96376 TX/PRO/DX INJ SAME DRUG ADON: CPT

## 2021-11-15 PROCEDURE — 96375 TX/PRO/DX INJ NEW DRUG ADDON: CPT

## 2021-11-15 PROCEDURE — 82607 VITAMIN B-12: CPT | Performed by: HOSPITALIST

## 2021-11-15 PROCEDURE — G0378 HOSPITAL OBSERVATION PER HR: HCPCS

## 2021-11-15 PROCEDURE — 85025 COMPLETE CBC W/AUTO DIFF WBC: CPT | Performed by: HOSPITALIST

## 2021-11-15 PROCEDURE — 80048 BASIC METABOLIC PNL TOTAL CA: CPT | Performed by: HOSPITALIST

## 2021-11-15 PROCEDURE — 96372 THER/PROPH/DIAG INJ SC/IM: CPT

## 2021-11-15 PROCEDURE — 99214 OFFICE O/P EST MOD 30 MIN: CPT | Performed by: INTERNAL MEDICINE

## 2021-11-15 PROCEDURE — 83735 ASSAY OF MAGNESIUM: CPT | Performed by: INTERNAL MEDICINE

## 2021-11-15 PROCEDURE — 93010 ELECTROCARDIOGRAM REPORT: CPT | Performed by: INTERNAL MEDICINE

## 2021-11-15 PROCEDURE — 97165 OT EVAL LOW COMPLEX 30 MIN: CPT

## 2021-11-15 PROCEDURE — 99226 PR SBSQ OBSERVATION CARE/DAY 35 MINUTES: CPT | Performed by: INTERNAL MEDICINE

## 2021-11-15 PROCEDURE — 25010000002 HYALURONIDASE (HUMAN) 150 UNIT/ML SOLUTION 1 ML VIAL: Performed by: NURSE PRACTITIONER

## 2021-11-15 PROCEDURE — 82962 GLUCOSE BLOOD TEST: CPT

## 2021-11-15 PROCEDURE — 25010000002 DIGOXIN PER 500 MCG: Performed by: INTERNAL MEDICINE

## 2021-11-15 RX ORDER — LANOLIN ALCOHOL/MO/W.PET/CERES
1000 CREAM (GRAM) TOPICAL DAILY
Status: DISCONTINUED | OUTPATIENT
Start: 2021-11-15 | End: 2021-11-16 | Stop reason: HOSPADM

## 2021-11-15 RX ORDER — METOPROLOL SUCCINATE 50 MG/1
50 TABLET, EXTENDED RELEASE ORAL
Qty: 30 TABLET | Refills: 0 | Status: SHIPPED | OUTPATIENT
Start: 2021-11-16 | End: 2022-02-08 | Stop reason: ALTCHOICE

## 2021-11-15 RX ORDER — DIGOXIN 0.25 MG/ML
250 INJECTION INTRAMUSCULAR; INTRAVENOUS ONCE
Status: COMPLETED | OUTPATIENT
Start: 2021-11-15 | End: 2021-11-15

## 2021-11-15 RX ORDER — ATORVASTATIN CALCIUM 10 MG/1
10 TABLET, FILM COATED ORAL NIGHTLY
Qty: 30 TABLET | Refills: 0 | Status: SHIPPED | OUTPATIENT
Start: 2021-11-15 | End: 2022-08-11 | Stop reason: SDUPTHER

## 2021-11-15 RX ADMIN — SILDENAFIL 20 MG: 20 TABLET, FILM COATED ORAL at 20:04

## 2021-11-15 RX ADMIN — TRAZODONE HYDROCHLORIDE 25 MG: 50 TABLET ORAL at 20:04

## 2021-11-15 RX ADMIN — SODIUM CHLORIDE, PRESERVATIVE FREE 10 ML: 5 INJECTION INTRAVENOUS at 08:36

## 2021-11-15 RX ADMIN — HYDROCODONE BITARTRATE AND ACETAMINOPHEN 1 TABLET: 7.5; 325 TABLET ORAL at 01:17

## 2021-11-15 RX ADMIN — APIXABAN 5 MG: 5 TABLET, FILM COATED ORAL at 08:36

## 2021-11-15 RX ADMIN — GABAPENTIN 600 MG: 600 TABLET, FILM COATED ORAL at 08:36

## 2021-11-15 RX ADMIN — DIGOXIN 125 MCG: 125 TABLET ORAL at 11:48

## 2021-11-15 RX ADMIN — SERTRALINE HYDROCHLORIDE 50 MG: 50 TABLET ORAL at 20:05

## 2021-11-15 RX ADMIN — GABAPENTIN 600 MG: 600 TABLET, FILM COATED ORAL at 20:05

## 2021-11-15 RX ADMIN — SODIUM CHLORIDE, PRESERVATIVE FREE 10 ML: 5 INJECTION INTRAVENOUS at 20:05

## 2021-11-15 RX ADMIN — HYDROCODONE BITARTRATE AND ACETAMINOPHEN 1 TABLET: 7.5; 325 TABLET ORAL at 08:36

## 2021-11-15 RX ADMIN — ROPINIROLE HYDROCHLORIDE 0.25 MG: 0.25 TABLET, FILM COATED ORAL at 20:04

## 2021-11-15 RX ADMIN — HYDROCODONE BITARTRATE AND ACETAMINOPHEN 1 TABLET: 7.5; 325 TABLET ORAL at 21:39

## 2021-11-15 RX ADMIN — ATORVASTATIN CALCIUM 80 MG: 40 TABLET, FILM COATED ORAL at 20:04

## 2021-11-15 RX ADMIN — CYANOCOBALAMIN TAB 1000 MCG 1000 MCG: 1000 TAB at 17:33

## 2021-11-15 RX ADMIN — DIGOXIN 250 MCG: 0.25 INJECTION INTRAMUSCULAR; INTRAVENOUS at 04:09

## 2021-11-15 RX ADMIN — HYALURONIDASE (HUMAN RECOMBINANT) 150 UNITS: 150 INJECTION, SOLUTION SUBCUTANEOUS at 05:34

## 2021-11-15 RX ADMIN — DIGOXIN 250 MCG: 0.25 INJECTION INTRAMUSCULAR; INTRAVENOUS at 16:07

## 2021-11-15 RX ADMIN — HYDROCODONE BITARTRATE AND ACETAMINOPHEN 1 TABLET: 7.5; 325 TABLET ORAL at 15:36

## 2021-11-15 RX ADMIN — APIXABAN 5 MG: 5 TABLET, FILM COATED ORAL at 20:05

## 2021-11-15 RX ADMIN — CYCLOBENZAPRINE 5 MG: 10 TABLET, FILM COATED ORAL at 20:04

## 2021-11-15 RX ADMIN — FAMOTIDINE 20 MG: 20 TABLET ORAL at 08:36

## 2021-11-15 RX ADMIN — METOPROLOL SUCCINATE 50 MG: 50 TABLET, EXTENDED RELEASE ORAL at 08:36

## 2021-11-15 RX ADMIN — LEVOTHYROXINE SODIUM 75 MCG: 0.07 TABLET ORAL at 08:36

## 2021-11-15 RX ADMIN — SILDENAFIL 20 MG: 20 TABLET, FILM COATED ORAL at 08:36

## 2021-11-15 NOTE — NURSING NOTE
When RN went to give STAT digoxin dose, IV flushed fine so medication was given.  When RN was exiting room, patient began complaining of pain from IV site.  Upon assessment, IV site was noted to be red, warm, and swollen.  Hospitalist notified, extravasation order set placed, ice packs applied, pictures taken, antidote given.

## 2021-11-15 NOTE — PLAN OF CARE
Patient remains in A-fib with RVR.  Call placed out to hospitalist.  Patient also continues to complain of pain that shortly responds to PRN oral pain medication.  Patient states that she is very depressed and lonely, and frequently seeks out staff.  Problem: Fall Injury Risk  Goal: Absence of Fall and Fall-Related Injury  Intervention: Promote Injury-Free Environment  Recent Flowsheet Documentation  Taken 11/15/2021 0318 by Krystal Hartman, RN  Safety Promotion/Fall Prevention:   safety round/check completed   room organization consistent   nonskid shoes/slippers when out of bed     Problem: Adult Inpatient Plan of Care  Goal: Absence of Hospital-Acquired Illness or Injury  Intervention: Identify and Manage Fall Risk  Recent Flowsheet Documentation  Taken 11/15/2021 0318 by Krystal Hartman, RN  Safety Promotion/Fall Prevention:   safety round/check completed   room organization consistent   nonskid shoes/slippers when out of bed  Intervention: Prevent Skin Injury  Recent Flowsheet Documentation  Taken 11/15/2021 0318 by Krystal Hartman, RN  Skin Protection: adhesive use limited   Goal Outcome Evaluation:

## 2021-11-15 NOTE — PLAN OF CARE
Goal Outcome Evaluation:               Patient remains a falls risk. Patient encouraged to turn Q2. Patient possibly could discharge pending cardiology input on heart rate. Vitals stable, will continue to monitor.        Problem: Fall Injury Risk  Goal: Absence of Fall and Fall-Related Injury  Outcome: Ongoing, Progressing  Intervention: Identify and Manage Contributors to Fall Injury Risk  Recent Flowsheet Documentation  Taken 11/15/2021 1500 by Sakina Gibbons RN  Medication Review/Management:   medications reviewed   high-risk medications identified  Self-Care Promotion: independence encouraged  Taken 11/15/2021 1400 by Sakina Gibbons RN  Medication Review/Management:   medications reviewed   high-risk medications identified  Taken 11/15/2021 1200 by Sakina Gibbons RN  Medication Review/Management:   medications reviewed   high-risk medications identified  Taken 11/15/2021 1100 by Sakina Gibbons RN  Medication Review/Management:   medications reviewed   high-risk medications identified  Self-Care Promotion: independence encouraged  Taken 11/15/2021 1000 by Sakina Gibbons RN  Medication Review/Management:   medications reviewed   high-risk medications identified  Taken 11/15/2021 0836 by Sakina Gibbons RN  Medication Review/Management:   medications reviewed   high-risk medications identified  Self-Care Promotion: independence encouraged  Taken 11/15/2021 0800 by Sakina Gibbons RN  Medication Review/Management:   medications reviewed   high-risk medications identified  Intervention: Promote Injury-Free Environment  Recent Flowsheet Documentation  Taken 11/15/2021 1500 by Sakina Gibbons RN  Safety Promotion/Fall Prevention:   activity supervised   assistive device/personal items within reach   clutter free environment maintained   fall prevention program maintained   lighting adjusted   nonskid shoes/slippers when out of bed   room organization consistent   safety round/check completed  Taken  11/15/2021 1400 by Sakina Gbibons RN  Safety Promotion/Fall Prevention:   activity supervised   assistive device/personal items within reach   clutter free environment maintained   fall prevention program maintained   lighting adjusted   nonskid shoes/slippers when out of bed   room organization consistent   safety round/check completed  Taken 11/15/2021 1200 by Sakina Gibbons RN  Safety Promotion/Fall Prevention:   assistive device/personal items within reach   activity supervised   clutter free environment maintained   fall prevention program maintained   lighting adjusted   nonskid shoes/slippers when out of bed   room organization consistent   safety round/check completed  Taken 11/15/2021 1000 by Sakina Gibbons RN  Safety Promotion/Fall Prevention:   activity supervised   assistive device/personal items within reach   clutter free environment maintained   fall prevention program maintained   lighting adjusted   nonskid shoes/slippers when out of bed   safety round/check completed  Taken 11/15/2021 0836 by Sakina Gibbons RN  Safety Promotion/Fall Prevention:   activity supervised   assistive device/personal items within reach   clutter free environment maintained   fall prevention program maintained   lighting adjusted   nonskid shoes/slippers when out of bed   safety round/check completed   room organization consistent  Taken 11/15/2021 0800 by Sakina Gibbons RN  Safety Promotion/Fall Prevention:   activity supervised   clutter free environment maintained   assistive device/personal items within reach   fall prevention program maintained   lighting adjusted   nonskid shoes/slippers when out of bed   room organization consistent   safety round/check completed     Problem: Adult Inpatient Plan of Care  Goal: Plan of Care Review  Outcome: Ongoing, Progressing  Flowsheets (Taken 11/14/2021 1655 by Lynda Monreal RN)  Plan of Care Reviewed With: patient  Goal: Patient-Specific Goal  (Individualized)  Outcome: Ongoing, Progressing  Goal: Absence of Hospital-Acquired Illness or Injury  Outcome: Ongoing, Progressing  Intervention: Identify and Manage Fall Risk  Recent Flowsheet Documentation  Taken 11/15/2021 1500 by Sakina Gibbons RN  Safety Promotion/Fall Prevention:   activity supervised   assistive device/personal items within reach   clutter free environment maintained   fall prevention program maintained   lighting adjusted   nonskid shoes/slippers when out of bed   room organization consistent   safety round/check completed  Taken 11/15/2021 1400 by Sakina Gibbons RN  Safety Promotion/Fall Prevention:   activity supervised   assistive device/personal items within reach   clutter free environment maintained   fall prevention program maintained   lighting adjusted   nonskid shoes/slippers when out of bed   room organization consistent   safety round/check completed  Taken 11/15/2021 1200 by Sakina Gibbons RN  Safety Promotion/Fall Prevention:   assistive device/personal items within reach   activity supervised   clutter free environment maintained   fall prevention program maintained   lighting adjusted   nonskid shoes/slippers when out of bed   room organization consistent   safety round/check completed  Taken 11/15/2021 1000 by Sakina Gibbons RN  Safety Promotion/Fall Prevention:   activity supervised   assistive device/personal items within reach   clutter free environment maintained   fall prevention program maintained   lighting adjusted   nonskid shoes/slippers when out of bed   safety round/check completed  Taken 11/15/2021 0836 by Sakina Gibbons, RN  Safety Promotion/Fall Prevention:   activity supervised   assistive device/personal items within reach   clutter free environment maintained   fall prevention program maintained   lighting adjusted   nonskid shoes/slippers when out of bed   safety round/check completed   room organization consistent  Taken 11/15/2021 0800 by  O'Paullina, Sakina, RN  Safety Promotion/Fall Prevention:   activity supervised   clutter free environment maintained   assistive device/personal items within reach   fall prevention program maintained   lighting adjusted   nonskid shoes/slippers when out of bed   room organization consistent   safety round/check completed  Intervention: Prevent Skin Injury  Recent Flowsheet Documentation  Taken 11/15/2021 1500 by Sakina Gibbons RN  Skin Protection:   adhesive use limited   incontinence pads utilized   transparent dressing maintained   tubing/devices free from skin contact  Taken 11/15/2021 0836 by Sakina Gibbons RN  Body Position: weight shift assistance provided  Skin Protection:   adhesive use limited   incontinence pads utilized   transparent dressing maintained   tubing/devices free from skin contact  Intervention: Prevent and Manage VTE (venous thromboembolism) Risk  Recent Flowsheet Documentation  Taken 11/15/2021 0836 by Sakina Gibbons RN  VTE Prevention/Management:   bilateral   sequential compression devices on  Intervention: Prevent Infection  Recent Flowsheet Documentation  Taken 11/15/2021 1500 by Sakina Gibbons RN  Infection Prevention: personal protective equipment utilized  Taken 11/15/2021 1400 by Sakina Gibbons RN  Infection Prevention:   personal protective equipment utilized   hand hygiene promoted  Taken 11/15/2021 1200 by Sakina Gibbons RN  Infection Prevention:   personal protective equipment utilized   hand hygiene promoted  Taken 11/15/2021 1000 by Sakina Gibbons RN  Infection Prevention:   personal protective equipment utilized   hand hygiene promoted  Taken 11/15/2021 0836 by Sakina Gibbons RN  Infection Prevention:   personal protective equipment utilized   hand hygiene promoted  Taken 11/15/2021 0800 by Sakina Gibbons RN  Infection Prevention:   personal protective equipment utilized   hand hygiene promoted  Goal: Optimal Comfort and Wellbeing  Outcome: Ongoing,  Progressing  Intervention: Provide Person-Centered Care  Recent Flowsheet Documentation  Taken 11/15/2021 1500 by Sakina Gibbons RN  Trust Relationship/Rapport:   choices provided   care explained   emotional support provided   questions answered   questions encouraged   thoughts/feelings acknowledged  Taken 11/15/2021 1100 by Sakina Gibbons RN  Trust Relationship/Rapport:   care explained   choices provided   emotional support provided   questions answered   questions encouraged   thoughts/feelings acknowledged  Taken 11/15/2021 0836 by Sakina Gibbons RN  Trust Relationship/Rapport:   care explained   choices provided   emotional support provided   questions answered   questions encouraged   thoughts/feelings acknowledged  Goal: Readiness for Transition of Care  Outcome: Ongoing, Progressing     Problem: Hypertension Comorbidity  Goal: Blood Pressure in Desired Range  Outcome: Ongoing, Progressing  Intervention: Maintain Hypertension-Management Strategies  Recent Flowsheet Documentation  Taken 11/15/2021 1500 by Sakina Gibbons RN  Medication Review/Management:   medications reviewed   high-risk medications identified  Taken 11/15/2021 1400 by Sakina Gibbons RN  Medication Review/Management:   medications reviewed   high-risk medications identified  Taken 11/15/2021 1200 by Sakina Gibbons RN  Medication Review/Management:   medications reviewed   high-risk medications identified  Taken 11/15/2021 1100 by Sakina Gibbons RN  Medication Review/Management:   medications reviewed   high-risk medications identified  Taken 11/15/2021 1000 by Sakina Gibbons RN  Medication Review/Management:   medications reviewed   high-risk medications identified  Taken 11/15/2021 0836 by Sakina Gibbons RN  Medication Review/Management:   medications reviewed   high-risk medications identified  Taken 11/15/2021 0800 by Sakina Gibbons RN  Medication Review/Management:   medications reviewed   high-risk medications  identified     Problem: Pain Chronic (Persistent) (Comorbidity Management)  Goal: Acceptable Pain Control and Functional Ability  Outcome: Ongoing, Progressing  Intervention: Develop Pain Management Plan  Recent Flowsheet Documentation  Taken 11/15/2021 0836 by Sakina Gibbons RN  Pain Management Interventions:   see MAR   pillow support provided   position adjusted  Intervention: Manage Persistent Pain  Recent Flowsheet Documentation  Taken 11/15/2021 1500 by Sakina Gibbons RN  Medication Review/Management:   medications reviewed   high-risk medications identified  Taken 11/15/2021 1400 by Sakina Gibbons RN  Medication Review/Management:   medications reviewed   high-risk medications identified  Taken 11/15/2021 1200 by Sakina Gibbons RN  Medication Review/Management:   medications reviewed   high-risk medications identified  Taken 11/15/2021 1100 by Sakina Gibbons RN  Medication Review/Management:   medications reviewed   high-risk medications identified  Taken 11/15/2021 1000 by Sakina Gibbons RN  Medication Review/Management:   medications reviewed   high-risk medications identified  Taken 11/15/2021 0836 by Sakina Gibbons RN  Sleep/Rest Enhancement: consistent schedule promoted  Medication Review/Management:   medications reviewed   high-risk medications identified  Taken 11/15/2021 0800 by Sakina Gibbons RN  Medication Review/Management:   medications reviewed   high-risk medications identified  Intervention: Optimize Psychosocial Wellbeing  Recent Flowsheet Documentation  Taken 11/15/2021 1500 by Sakina Gibbons RN  Supportive Measures:   active listening utilized   self-care encouraged  Diversional Activities: television  Family/Support System Care:   self-care encouraged   support provided  Taken 11/15/2021 1100 by Sakina Gibbons RN  Supportive Measures:   active listening utilized   self-care encouraged  Diversional Activities: television  Family/Support System Care:   self-care  encouraged   support provided  Taken 11/15/2021 0836 by Sakina Gibbons, RN  Supportive Measures:   active listening utilized   self-care encouraged  Diversional Activities: television  Family/Support System Care:   self-care encouraged   support provided

## 2021-11-15 NOTE — PROGRESS NOTES
[facility] Medicine Services Daily Progress Note    Patient Name: Catalina Moreno  : 1940  MRN: [id number]  Primary Care Physician:  Anayeli Costa APRN  Date of admission: 2021      Subjective      Chief Complaint:       Patient Reports    21: Complains of left leg pain.  Denies recent trauma.  Afebrile.  MRI of the brain ordered.    11/15/2021: Patient states no new complaints at this time.  She denies any dizziness, lightheadedness, chest pain, or difficulty breathing.  However while I am talking to her at bedside, her heart rate has been fluctuating anywhere from the 100s up to the 140s.  Overnight she had a run of V. tach and was in A. fib with RVR, so cardiology has been consulted.    Review of Systems   All other systems reviewed and are negative.         Objective      Vitals:   Temp:  [97.4 °F (36.3 °C)-97.9 °F (36.6 °C)] 97.9 °F (36.6 °C)  Heart Rate:  [] 117  Resp:  [14-18] 18  BP: ()/(56-75) 110/56    Physical Exam  HENT:      Head: Normocephalic.      Nose: Nose normal.   Eyes:      Extraocular Movements: Extraocular movements intact.      Pupils: Pupils are equal, round, and reactive to light.   Cardiovascular:      Rate and Rhythm: Regular rhythm. Tachycardia present.      Pulses: Normal pulses.   Pulmonary:      Effort: Pulmonary effort is normal.   Abdominal:      General: Bowel sounds are normal.   Musculoskeletal:      Cervical back: Normal range of motion.   Skin:     General: Skin is warm.   Neurological:      Mental Status: She is alert and oriented to person, place, and time. Mental status is at baseline.   Psychiatric:         Mood and Affect: Mood normal.               Result Review    Result Review:  I have personally reviewed the results from the time of this admission to 11/15/2021 16:21 EST and agree with these findings:  [x]  Laboratory  []  Microbiology  [x]  Radiology  [x]  EKG/Telemetry   [x]  Cardiology/Vascular   []   Pathology  [x]  Old records  []  Other:      Wounds (last 24 hours)     LDA Wound     Row Name 11/15/21 1500 11/15/21 1100 11/15/21 0836       Wound 11/15/21 0424 Right lower arm Extravasation    Wound - Properties Group Placement Date: 11/15/21  -KK Placement Time: 0424 -KK Side: Right  -KK Orientation: lower  -KK Location: arm  -KK Primary Wound Type: Extravasatio  -KK    Periwound Temperature warm  -KO warm  -KO warm  -KO    Periwound Skin Turgor firm  -KO firm  -KO firm  -KO    Drainage Amount none  -KO none  -KO none  -KO    Retired Wound - Properties Group Date first assessed: 11/15/21  -KK Time first assessed: 0424 -KK Side: Right  -KK Location: arm  -KK Primary Wound Type: Extravasatio  -KK    Row Name 11/15/21 0542 11/15/21 0424          Wound 11/15/21 0424 Right lower arm Extravasation    Wound - Properties Group Placement Date: 11/15/21  -KK Placement Time: 0424 -KK Side: Right  -KK Orientation: lower  -KK Location: arm  -KK Primary Wound Type: Extravasatio  -KK     Wound Image  View All Images View Images  -KK --     Dressing Appearance -- area marked  -KK     Periwound Temperature -- warm  -KK     Periwound Skin Turgor -- firm  -KK     Drainage Amount -- none  -KK     Retired Wound - Properties Group Date first assessed: 11/15/21  -KK Time first assessed: 0424 -KK Side: Right  -KK Location: arm  -KK Primary Wound Type: Extravasatio  -KK           User Key  (r) = Recorded By, (t) = Taken By, (c) = Cosigned By    Initials Name Provider Type    Krystal Finney RN Registered Nurse    Sakina Roger RN Registered Nurse                  Assessment/Plan      Brief Patient Summary:  81 years old female with left-sided weakness and complaints of lower back pain that radiates to left leg. Neurology work-up has ruled out acute stroke, although TIA could be possible. Overnight the patient was noted to have runs of Vtach and A.fib with RVR. Cardiology was consulted by the dane NP. Today, the patient  continues to be in Afib with RVR with HR up to 140's.      apixaban, 5 mg, Oral, Q12H  atorvastatin, 80 mg, Oral, Nightly  digoxin, 125 mcg, Oral, Daily  famotidine, 20 mg, Oral, Daily  gabapentin, 600 mg, Oral, Q12H  levothyroxine, 75 mcg, Oral, Daily  metoprolol succinate XL, 50 mg, Oral, Q24H  rOPINIRole, 0.25 mg, Oral, Nightly  sertraline, 50 mg, Oral, Nightly  sildenafil, 20 mg, Oral, BID  traZODone, 25 mg, Oral, Nightly             Active Hospital Problems:  Active Hospital Problems    Diagnosis    • **Stroke-like symptoms    • Chronic diastolic CHF (congestive heart failure) (HCC)    • Essential (primary) hypertension    • Paroxysmal atrial fibrillation (HCC)    • Restless legs syndrome    • Generalized anxiety disorder    • Polypharmacy    • Stage 2 chronic kidney disease    • Atrial fibrillation (HCC)    • Chronic low back pain    • Neuropathic pain    • Gastroesophageal reflux disease    • Essential hypertension    • Hypothyroidism    • Depressive disorder      Left-sided weakness and left leg pain:  -s/p CT head w/o and CT head/neck--> acute intracranial pathology ruled out  -MRI of the brain--> unremarkable  -Continue neurological checks  -PT/OT ordered --> appears to be at baseline  --Keep BP < 130/80, HgbA1C < 6.5,  Vitamin B12 > 500.  LDL < 70    --Currently HgbA1c 6.0, LDL 57, B12 343    --Start vitamin B12 supplement  --Continue beta-blocker and statin    Atrial fibrillation RVR: New runs of V. tach overnight  -Continue digoxin and metoprolol  -Continue Eliquis  --Recurrent RVR overnight with runs of V. tach  --Cardiology consult pending    Acute on chronic low back pain:  -Continue home Lidoderm patch, Norco, Flexeril and gabapentin    Hypertension:  -Continue metoprolol    Chronic diastolic heart failure:  -Daily weights  --Continue beta-blocker, statin,  -TTE 7/26/2021--> LVEF 59%.  No valvular dysfunction    Pulmonary hypertension  -Continue home sildenafil      Stage II chronic kidney  disease  --Avoid nephrotoxins     Hypothyroidism  -Continue levothyroxine   --TSH 4.5    Restless legs syndrome  -Continue Requip      Gastroesophageal reflux disease  -Continue home PPI     Depressive disorder/generalized anxiety disorder  -Continue home Zoloft, trazodone        DVT prophylaxis:  Medical and mechanical DVT prophylaxis orders are present.    CODE STATUS:    Code Status (Patient has no pulse and is not breathing): CPR (Attempt to Resuscitate)  Medical Interventions (Patient has pulse or is breathing): Full Support      Disposition:  I expect patient to be discharged defer until arrhythmia can be controlled.    This patient has been examined wearing appropriate Personal Protective Equipment and discussed with nursing. 11/15/21      Electronically signed by Shahla Cardenas MD, 11/15/21, 16:21 EST.  [facility]

## 2021-11-15 NOTE — PLAN OF CARE
Goal Outcome Evaluation:              Outcome Summary: Pt admitted for L sided weakness and confusion, resolved. No acute CVA, likely TIA.  She will be able to get intermittant assist from son when needed.  She appears to be near baseline level of function.  No longer showing any symptoms.  No further acute OT needs.

## 2021-11-15 NOTE — SIGNIFICANT NOTE
11/15/21 1200   OTHER   Discipline speech language pathologist   Orders generated as per stroke workup. The patient passed the stroke swallow screen and is currently receiving a regular consistency/thin liquid diet. CT of the head negative. MRI showed no acute intracranial abnormality. Per chart/nurse report, all deficits have resolved, patient is reportedly at baseline and NIH is 0.  No skilled speech/language or swallow intervention indicated. ST will complete order and sign off at this time as per protocol. Please re-consult if our services are warranted in the future. Thank you.

## 2021-11-15 NOTE — DISCHARGE SUMMARY
[facility]  DISCHARGE SUMMARY    Patient Name: Catalina Moreno  : 1940  MRN: [id number]    Date of Admission: 2021  Date of Discharge:  2021  Primary Care Physician: Anayeli Costa APRN      Presenting Problem:   Left-sided weakness [R53.1]  Stroke-like symptoms [R29.90]  Atrial fibrillation with rapid ventricular response (HCC) [I48.91]    Active and Resolved Hospital Problems:  Active Hospital Problems    Diagnosis POA   • Chronic diastolic CHF (congestive heart failure) (HCC) [I50.32] Yes   • Essential (primary) hypertension [I10] Yes   • Paroxysmal atrial fibrillation (HCC) [I48.0] Yes   • Restless legs syndrome [G25.81] Yes   • Generalized anxiety disorder [F41.1] Yes   • Polypharmacy [Z79.899] Not Applicable   • Stage 2 chronic kidney disease [N18.2] Yes   • Atrial fibrillation (HCC) [I48.91] Yes   • Chronic low back pain [M54.50, G89.29] Yes   • Neuropathic pain [M79.2] Yes   • Gastroesophageal reflux disease [K21.9] Yes   • Essential hypertension [I10] Yes   • Hypothyroidism [E03.9] Yes   • Depressive disorder [F32.9] Yes      Resolved Hospital Problems    Diagnosis POA   • **Stroke-like symptoms [R29.90] Yes   • Atrial fibrillation with rapid ventricular response (HCC) [I48.91] Yes         Hospital Course     Hospital Course:  Catalina Moreno is a 81 y.o. female who presented with left-sided weakness and complaints of lower back pain that radiates to left leg. Neurology work-up has ruled out acute stroke, although TIA could be possible.  Occasional runs of Vtach and A.fib with RVR were noted. Cardiology and increased her dose of digoxin to bring down her heart rate in addition to her existing medications.    The patient's acute and chronic medical conditions were managed as outlined below:    Left-sided weakness and left leg pain:  -s/p CT head w/o and CT head/neck--> acute intracranial pathology ruled out  -MRI of the brain-->  unremarkable  -Continue neurological checks  -PT/OT ordered --> appears to be at baseline  --Keep BP < 130/80, HgbA1C < 6.5,  Vitamin B12 > 500.  LDL < 70    --Currently HgbA1c 6.0, LDL 57, B12 343    --Start vitamin B12 supplement  --Continue beta-blocker and statin     Atrial fibrillation RVR: New runs of V. tach overnight  -Continue digoxin and metoprolol  -Continue Eliquis  --Recurrent RVR overnight with runs of V. tach  --Cardiology following    --Increased dose of digoxin     Acute on chronic low back pain:  -Continue home Lidoderm patch, Norco, Flexeril and gabapentin     Hypertension:  -Continue metoprolol     Chronic diastolic heart failure:  -Daily weights  --Continue beta-blocker, statin,  -TTE 7/26/2021--> LVEF 59%.  No valvular dysfunction     Pulmonary hypertension  -Continue home sildenafil      Stage II chronic kidney disease  --Avoid nephrotoxins     Hypothyroidism  -Continue levothyroxine   --TSH 4.5     Restless legs syndrome  -Continue Requip      Gastroesophageal reflux disease  -Continue home PPI     Depressive disorder/generalized anxiety disorder  -Continue home Zoloft, trazodone    The patient was discharged home in good condition with discharge instructions and follow-up appointments as noted below.      Day of Discharge     Vital Signs:  Temp:  [97.7 °F (36.5 °C)-98.5 °F (36.9 °C)] 98.4 °F (36.9 °C)  Heart Rate:  [] 100  Resp:  [12-18] 16  BP: (101-120)/(41-66) 120/51    Physical Exam:  General: well-developed and well-nourished, NAD  HEENT: NC/AT, EOMI, PERRLA  Heart: RRR. No murmur   Chest: CTAB, no w/r/r, normal respiratory effort  Abdominal: Soft. NT/ND. Bowel sounds present  Musculoskeletal: Normal ROM.  No edema. No calf tenderness.  Neurological: AAOx3, no focal deficits  Skin: Skin is warm and dry. No rash  Psychiatric: Normal mood and affect.      Pertinent  and/or Most Recent Results     LAB RESULTS:      Lab 11/16/21  0356 11/15/21  0716 11/14/21  0336 11/13/21  1832  11/13/21  1824   WBC 5.30 5.70 6.10  --  6.10   HEMOGLOBIN 9.8* 10.6* 10.1*  --  10.6*   HEMATOCRIT 29.6* 32.1* 30.8*  --  31.9*   PLATELETS 224 227 250  --  259   NEUTROS ABS  --  2.10  --   --  3.00   LYMPHS ABS  --  2.70  --   --  2.30   MONOS ABS  --  0.50  --   --  0.60   EOS ABS  --  0.30  --   --  0.20   MCV 87.4 86.3 86.2  --  86.4   LACTATE  --   --   --  1.4  --    PROTIME  --   --   --   --  10.9         Lab 11/16/21  0356 11/15/21  0716 11/14/21 0336 11/13/21  1824   SODIUM 142 140 143 144   POTASSIUM 4.4 4.4 4.1 4.5   CHLORIDE 106 105 108* 108*   CO2 24.0 25.0 24.0 23.0   ANION GAP 12.0 10.0 11.0 13.0   BUN 16 19 12 15   CREATININE 0.76 0.67 0.65 0.72   GLUCOSE 128* 102* 86 121*   CALCIUM 8.4* 8.7 8.6 9.0   MAGNESIUM  --  2.3  --  2.3   HEMOGLOBIN A1C  --   --   --  6.0*   TSH  --   --   --  4.520*         Lab 11/14/21  0336 11/13/21  1824   TOTAL PROTEIN 6.1 7.0   ALBUMIN 3.80 4.40   GLOBULIN 2.3 2.6   ALT (SGPT) 9 12   AST (SGOT) 13 15   BILIRUBIN 0.6 0.5   ALK PHOS 65 69         Lab 11/13/21  1824   TROPONIN T <0.010   PROTIME 10.9   INR 0.98         Lab 11/13/21  1824   CHOLESTEROL 124   LDL CHOL 57   HDL CHOL 48   TRIGLYCERIDES 100         Lab 11/15/21  0716 11/13/21  1824   VITAMIN B 12 343  --    ABO TYPING  --  A   RH TYPING  --  Negative   ANTIBODY SCREEN  --  Negative         Brief Urine Lab Results  (Last result in the past 365 days)      Color   Clarity   Blood   Leuk Est   Nitrite   Protein   CREAT   Urine HCG        11/13/21 1913 Yellow   Clear   Negative   Negative   Negative   Negative               Microbiology Results (last 10 days)     Procedure Component Value - Date/Time    Blood Culture - Blood, Arm, Left [[id number]]  (Normal) Collected: 11/13/21 1835    Lab Status: Preliminary result Specimen: Blood from Arm, Left Updated: 11/15/21 1846     Blood Culture No growth at 2 days    COVID-19,CEPHEID/CARLI/BDMAX,COR/KEVEN/PAD/ISAÍAS IN-HOUSE(OR EMERGENT/ADD-ON),NP SWAB IN TRANSPORT MEDIA 3-4  HR TAT, RT-PCR - Swab, Nasopharynx [[id number]]  (Normal) Collected: 11/13/21 1830    Lab Status: Final result Specimen: Swab from Nasopharynx Updated: 11/13/21 1853     COVID19 Not Detected    Narrative:      Fact sheet for providers: https://www.fda.gov/media/369125/download     Fact sheet for patients: https://www.fda.gov/media/545876/download  Fact sheet for providers: https://www.fda.gov/media/701265/download    Fact sheet for patients: https://www.fda.gov/media/672276/download    Test performed by PCR.    Blood Culture - Blood, Arm, Right [[id number]]  (Normal) Collected: 11/13/21 1824    Lab Status: Preliminary result Specimen: Blood from Arm, Right Updated: 11/15/21 1846     Blood Culture No growth at 2 days          CT Angiogram Neck    Result Date: 11/13/2021  Impression: 1. No acute intracranial abnormality evident by noncontrast CT head examination. 2. Mild volume loss and chronic microvascular ischemic changes. 3. No hemodynamically significant stenosis or dissection within the vasculature of the neck. 4. No hemodynamically significant stenosis or branch vessel occlusion intracranially. Electronically signed by:  Petros Villaseñor M.D.  11/13/2021 7:09 PM    MRI Brain Without Contrast    Result Date: 11/14/2021  Impression: No acute intracranial abnormality or is significant change in the MR appearance of the brain since 01/12/2019. There is a minimal amount of white matter disease likely related to chronic microvascular ischemia.  Electronically Signed By-Can Mosquera DO On:11/14/2021 7:33 PM This report was finalized on [id number] by  Can Mosquera DO.    XR Chest 1 View    Result Date: 11/13/2021  Impression: No acute cardiopulmonary abnormality is identified.  Electronically Signed By-Dahlia Duque MD On:11/13/2021 6:52 PM This report was finalized on [id number] by  Dahlia Duque MD.    CT Head Without Contrast Stroke Protocol    Result Date: 11/13/2021  Impression: 1. No acute intracranial  abnormality evident by noncontrast CT head examination. 2. Mild volume loss and chronic microvascular ischemic changes. 3. No hemodynamically significant stenosis or dissection within the vasculature of the neck. 4. No hemodynamically significant stenosis or branch vessel occlusion intracranially. Electronically signed by:  Petros Villaseñor M.D.  11/13/2021 7:09 PM    CT Angiogram Head w AI Analysis of LVO    Result Date: 11/13/2021  Impression: 1. No acute intracranial abnormality evident by noncontrast CT head examination. 2. Mild volume loss and chronic microvascular ischemic changes. 3. No hemodynamically significant stenosis or dissection within the vasculature of the neck. 4. No hemodynamically significant stenosis or branch vessel occlusion intracranially. Electronically signed by:  Petros Villaseñor M.D.  11/13/2021 7:09 PM      Results for orders placed during the hospital encounter of 07/25/21    Duplex Venous Lower Extremity - Bilateral    Interpretation Summary  · Normal bilateral lower extremity venous duplex scan.  · Left popliteal fossa fluid collection.      Results for orders placed during the hospital encounter of 07/25/21    Duplex Venous Lower Extremity - Bilateral    Interpretation Summary  · Normal bilateral lower extremity venous duplex scan.  · Left popliteal fossa fluid collection.      Results for orders placed during the hospital encounter of 07/25/21    Adult Transthoracic Echo Complete W/ Cont if Necessary Per Protocol    Interpretation Summary  · Estimated left ventricular EF = 60% Left ventricular systolic function is normal.  · Moderate aortic valve regurgitation is present.  · Estimated right ventricular systolic pressure from tricuspid regurgitation is normal (<35 mmHg).      Labs Pending at Discharge:  Pending Labs     Order Current Status    Blood Culture - Blood, Arm, Left Preliminary result    Blood Culture - Blood, Arm, Right Preliminary result          Procedures Performed            Consults:   Consults     Date and Time Order Name Status Description    11/15/2021  3:41 AM Inpatient Cardiology Consult Completed     11/13/2021 10:32 PM Inpatient Neurology Consult Stroke Completed           Discharge Details        Discharge Medications      New Medications      Instructions Start Date   atorvastatin 10 MG tablet  Commonly known as: LIPITOR   10 mg, Oral, Nightly      cyanocobalamin 1000 MCG tablet  Commonly known as: VITAMIN B-12   1,000 mcg, Oral, Daily   Start Date: November 17, 2021        Changes to Medications      Instructions Start Date   metoprolol succinate XL 50 MG 24 hr tablet  Commonly known as: TOPROL-XL  What changed: Another medication with the same name was added. Make sure you understand how and when to take each.   TAKE 1 TABLET BY MOUTH DAILY. CALL MEDICAL DOCTOR OFFICE TO MAKE AND KEEP APPOINTMENT FOR FURTHER REFILLS      metoprolol succinate XL 50 MG 24 hr tablet  Commonly known as: TOPROL-XL  What changed: You were already taking a medication with the same name, and this prescription was added. Make sure you understand how and when to take each.   50 mg, Oral, Every 24 Hours Scheduled         Continue These Medications      Instructions Start Date   apixaban 5 MG tablet tablet  Commonly known as: ELIQUIS   5 mg, Oral, Every 12 Hours Scheduled      cyclobenzaprine 5 MG tablet  Commonly known as: FLEXERIL   5 mg, Oral, 2 Times Daily PRN      digoxin 125 MCG tablet  Commonly known as: LANOXIN   125 mcg, Oral, Daily Digoxin      famotidine 20 MG tablet  Commonly known as: PEPCID   20 mg, Oral, Daily      gabapentin 600 MG tablet  Commonly known as: NEURONTIN   TAKE 1 TABLET BY MOUTH FOUR TIMES DAILY      HYDROcodone-acetaminophen 7.5-325 MG per tablet  Commonly known as: NORCO   1 tablet, Oral, 4 Times Daily PRN, DNF before 10/12/2021      levothyroxine 75 MCG tablet  Commonly known as: SYNTHROID, LEVOTHROID   75 mcg, Oral, Daily, Take preop      lidocaine 5 %  Commonly  known as: LIDODERM   1 patch, Transdermal, Daily PRN, Remove & Discard patch within 12 hours or as directed by MD       pantoprazole 40 MG EC tablet  Commonly known as: PROTONIX   40 mg, Oral, Daily With Lunch, Afternoons      rOPINIRole 0.25 MG tablet  Commonly known as: REQUIP   0.25 mg, Oral, Nightly, Take 1 hour before bedtime      sertraline 50 MG tablet  Commonly known as: ZOLOFT   50 mg, Oral, Nightly      sildenafil 20 MG tablet  Commonly known as: REVATIO   20 mg, Oral, 2 Times Daily      traZODone 50 MG tablet  Commonly known as: DESYREL   25 mg, Oral, Nightly         Stop These Medications    furosemide 40 MG tablet  Commonly known as: LASIX            Allergies   Allergen Reactions   • Baclofen Rash   • Codeine Nausea Only   • Ibuprofen Unknown (See Comments)     Patient doesn't know----Motin   • Methocarbamol Unknown (See Comments)     Patient doesn't know   • Naproxen Unknown (See Comments)     Pt. Doesn't know    • Tizanidine Hcl Hallucinations   • Tolmetin Dizziness     Pt. Doesn't know-- same as Tolectin         [facility]charge Disposition: [facility]    Diet:  Hospital:  Diet Order   Procedures   • Diet Cardiac; Healthy Heart       Discharge Activity:   Activity Instructions     Activity as Tolerated            CODE STATUS:  Code Status and Medical Interventions:   Ordered at: 11/13/21 2232     Code Status (Patient has no pulse and is not breathing):    CPR (Attempt to Resuscitate)     Medical Interventions (Patient has pulse or is breathing):    Full Support       Future Appointments   Date Time Provider Department Center   11/17/2021 To Be Determined Edy Dorman, PT [facility]   11/17/2021 To Be Determined Fay Park, RN [facility]       Additional Instructions for the Follow-ups that You Need to Schedule     Ambulatory Referral to Home Health   As directed      Face to Face Visit Date: 11/14/2021    Follow-up provider for Plan of  Care?: I treated the patient in an acute care facility and will not continue treatment after discharge.    Follow-up provider: ANAYELI COSTA [[id number]]    Reason/Clinical Findings: post hospital eval    Describe mobility limitations that make leaving home difficult: impaired mobility    Nursing/Therapeutic Services Requested: Physical Therapy    PT orders: Home safety assessment Strengthening    Frequency: 1 Week 1         Ambulatory Referral to Home Health   As directed      Face to Face Visit Date: 11/15/2021    Follow-up provider for Plan of Care?: I treated the patient in an acute care facility and will not continue treatment after discharge.    Follow-up provider: ANAYELI COSTA [[id number]]    Reason/Clinical Findings: A-Fib, CHF    Describe mobility limitations that make leaving home difficult: A-Fib, CHF    Nursing/Therapeutic Services Requested: Skilled Nursing Physical Therapy    Skilled nursing orders: Other (A-Fib teaching) CHF management    PT orders:  (eval for decreased mobility with left side weakness, ambulation, falls safety)    Frequency: 1 Week 1         Call MD With Problems / Concerns   As directed      Instructions: Call [phone] or email [email or web address] for problems or concerns.    Order Comments: Instructions: Call [phone] or email [email or web address] for problems or concerns.          Discharge Follow-up with PCP   As directed       Currently Documented PCP:    Anayeli Costa APRN    PCP Phone Number:    [phone]     Follow Up Details: 1 week         Discharge Follow-up with Specialty: Pain management   As directed      Specialty: Pain management    Follow Up Details: As previously scheduled         Discharge Follow-up with Specified Provider: Cardiology - Dr. Gonzalez; 3 Weeks   As directed      To: Cardiology Ivonne Gonzalez    Follow Up: 3 Weeks    Follow Up Details: or as directed         Discharge Follow-up with Specified Provider: Neurology -- per PCP or  patient choice; 1 Month   As directed      To: Neurology -- per PCP or patient choice    Follow Up: 1 Month               Time spent on Discharge including face to face service:  35 minutes    This patient has been examined wearing appropriate Personal Protective Equipment. 11/16/21      Signature: Electronically signed by hSahla Cardenas MD, 11/16/21, 4:33 PM EST.

## 2021-11-15 NOTE — PROGRESS NOTES
Spoke with patient to verify demographics and explain services. Pt is agreeable.     [facility]

## 2021-11-15 NOTE — NURSING NOTE
Around 0310, Patient had a run of VTACH.  Patient was asymptomatic.  She then had an elevated HR sustaining between the 120s-140s.  EKG confirmed she was in A-fib.  Hospitalist NP notified.  Strips of the run were obtained from [facility] and were placed on the chart.  Hospitalist NP, Kaity Slaughter, gave orders for cardiology consult.  Patient used to see Dr. Alcantara, but since he is no longer affiliated with [facility], consult was placed to on-call provider.  RN spoke with cardiologist, Dr. Gonzalez, and updated him on patient's condition including the A-fib RVR, the run of VTACH, and patient's current cardiac meds.  See provider orders.

## 2021-11-15 NOTE — CASE MANAGEMENT/SOCIAL WORK
Discharge Planning Assessment  [facility]     Patient Name: Catalina Moreno  MRN: [id number]  Today's Date: 11/15/2021    Admit Date: 11/13/2021     Discharge Needs Assessment     Row Name 11/15/21 1301       Living Environment    Lives With alone    Current Living Arrangements home/apartment/condo    Primary Care Provided by self    Provides Primary Care For no one    Family Caregiver if Needed child(evangelista), adult    Quality of Family Relationships helpful    Able to Return to Prior Arrangements yes       Resource/Environmental Concerns    Resource/Environmental Concerns none    Transportation Concerns car, none       Transition Planning    Patient/Family Anticipates Transition to home with help/services    Patient/Family Anticipated Services at Transition none    Transportation Anticipated family or friend will provide       Discharge Needs Assessment    Equipment Currently Used at Home walker, rolling    Concerns to be Addressed denies needs/concerns at this time    Anticipated Changes Related to Illness none    Equipment Needed After Discharge none               Discharge Plan     Row Name 11/15/21 130       Plan    Plan D/C Plan: Home with [facility] (accepted, order placed).    Patient/Family in Agreement with Plan yes    Plan Comments  spoke to patient at bedside wearing mask and goggles and keeping distance greater than 6 feet and spent less than 15 minutes in room. Patient lives alone, is IADLs and son provides transport. PCP and pharmacy verified-denies any difficulty affording meds. Patient set up with [facility] over weekend and remains agreeable. Patient states she has debo unable to contact her son, Karri ([phone]) and asked CM to contact-CM attempted to call patient's son, Karri x2 with no answer, mailbox full. CM notified bedside RN. Patient denies any d/c needs at this time and son will provide transport at d/c. EDWARDS letter reviewed with patient, verbal consent obtained and  copy left at bedside.              Continued Care and Services - Admitted Since 11/13/2021     Home Medical Care Coordination complete.    Service Provider Request Status Selected Services Address Phone Fax Patient Preferred    [facility]   Selected Home Health Services [address] [facility] [place] [address] [phone] --              Expected Discharge Date and Time     Expected Discharge Date Expected Discharge Time    Nov 16, 2021          Demographic Summary     Row Name 11/15/21 1300       General Information    Admission Type observation    Arrived From emergency department    Referral Source admission list    Reason for Consult discharge planning    Preferred Language English     Used During This Interaction no               Functional Status     Row Name 11/15/21 1301       Functional Status    Usual Activity Tolerance moderate    Current Activity Tolerance moderate       Functional Status, IADL    Medications independent    Meal Preparation independent    Housekeeping independent    Laundry independent    Shopping independent       Mental Status    General Appearance WDL WDL       Mental Status Summary    Recent Changes in Mental Status/Cognitive Functioning no changes                 Patient Forms     Row Name 11/15/21 1305       Patient Forms    Important Message from Medicare (IMM) --  EDWARDS 11/15/21    Patient Observation Letter Delivered  11/15/21    Delivered to Patient    Method of delivery In person                  Maite Silva

## 2021-11-16 VITALS
OXYGEN SATURATION: 94 % | SYSTOLIC BLOOD PRESSURE: 106 MMHG | WEIGHT: 170.64 LBS | RESPIRATION RATE: 16 BRPM | DIASTOLIC BLOOD PRESSURE: 42 MMHG | TEMPERATURE: 98.4 F | HEIGHT: 64 IN | HEART RATE: 90 BPM | BODY MASS INDEX: 29.13 KG/M2

## 2021-11-16 PROBLEM — R29.90 STROKE-LIKE SYMPTOMS: Status: RESOLVED | Noted: 2021-11-13 | Resolved: 2021-11-16

## 2021-11-16 PROBLEM — I48.91 ATRIAL FIBRILLATION WITH RAPID VENTRICULAR RESPONSE (HCC): Status: RESOLVED | Noted: 2021-11-15 | Resolved: 2021-11-16

## 2021-11-16 LAB
ANION GAP SERPL CALCULATED.3IONS-SCNC: 12 MMOL/L (ref 5–15)
BUN SERPL-MCNC: 16 MG/DL (ref 8–23)
BUN/CREAT SERPL: 21.1 (ref 7–25)
CALCIUM SPEC-SCNC: 8.4 MG/DL (ref 8.6–10.5)
CHLORIDE SERPL-SCNC: 106 MMOL/L (ref 98–107)
CO2 SERPL-SCNC: 24 MMOL/L (ref 22–29)
CREAT SERPL-MCNC: 0.76 MG/DL (ref 0.57–1)
DEPRECATED RDW RBC AUTO: 52.5 FL (ref 37–54)
DIGOXIN SERPL-MCNC: 1.5 NG/ML (ref 0.6–1.2)
ERYTHROCYTE [DISTWIDTH] IN BLOOD BY AUTOMATED COUNT: 17.5 % (ref 12.3–15.4)
GFR SERPL CREATININE-BSD FRML MDRD: 73 ML/MIN/1.73
GLUCOSE SERPL-MCNC: 128 MG/DL (ref 65–99)
HCT VFR BLD AUTO: 29.6 % (ref 34–46.6)
HGB BLD-MCNC: 9.8 G/DL (ref 12–15.9)
MCH RBC QN AUTO: 29 PG (ref 26.6–33)
MCHC RBC AUTO-ENTMCNC: 33.2 G/DL (ref 31.5–35.7)
MCV RBC AUTO: 87.4 FL (ref 79–97)
PLATELET # BLD AUTO: 224 10*3/MM3 (ref 140–450)
PMV BLD AUTO: 8.2 FL (ref 6–12)
POTASSIUM SERPL-SCNC: 4.4 MMOL/L (ref 3.5–5.2)
QT INTERVAL: 342 MS
QT INTERVAL: 349 MS
RBC # BLD AUTO: 3.39 10*6/MM3 (ref 3.77–5.28)
SODIUM SERPL-SCNC: 142 MMOL/L (ref 136–145)
WBC # BLD AUTO: 5.3 10*3/MM3 (ref 3.4–10.8)

## 2021-11-16 PROCEDURE — 99213 OFFICE O/P EST LOW 20 MIN: CPT | Performed by: INTERNAL MEDICINE

## 2021-11-16 PROCEDURE — 80162 ASSAY OF DIGOXIN TOTAL: CPT | Performed by: INTERNAL MEDICINE

## 2021-11-16 PROCEDURE — G0378 HOSPITAL OBSERVATION PER HR: HCPCS

## 2021-11-16 PROCEDURE — 85027 COMPLETE CBC AUTOMATED: CPT | Performed by: INTERNAL MEDICINE

## 2021-11-16 PROCEDURE — 80048 BASIC METABOLIC PNL TOTAL CA: CPT | Performed by: INTERNAL MEDICINE

## 2021-11-16 PROCEDURE — 99217 PR OBSERVATION CARE DISCHARGE MANAGEMENT: CPT | Performed by: INTERNAL MEDICINE

## 2021-11-16 RX ADMIN — DIGOXIN 125 MCG: 125 TABLET ORAL at 12:28

## 2021-11-16 RX ADMIN — HYDROCODONE BITARTRATE AND ACETAMINOPHEN 1 TABLET: 7.5; 325 TABLET ORAL at 05:08

## 2021-11-16 RX ADMIN — METOPROLOL SUCCINATE 50 MG: 50 TABLET, EXTENDED RELEASE ORAL at 10:01

## 2021-11-16 RX ADMIN — GABAPENTIN 600 MG: 600 TABLET, FILM COATED ORAL at 08:24

## 2021-11-16 RX ADMIN — APIXABAN 5 MG: 5 TABLET, FILM COATED ORAL at 08:24

## 2021-11-16 RX ADMIN — CYANOCOBALAMIN TAB 1000 MCG 1000 MCG: 1000 TAB at 08:24

## 2021-11-16 RX ADMIN — HYDROCODONE BITARTRATE AND ACETAMINOPHEN 1 TABLET: 7.5; 325 TABLET ORAL at 13:48

## 2021-11-16 RX ADMIN — SILDENAFIL 20 MG: 20 TABLET, FILM COATED ORAL at 08:24

## 2021-11-16 RX ADMIN — FAMOTIDINE 20 MG: 20 TABLET ORAL at 08:24

## 2021-11-16 RX ADMIN — LEVOTHYROXINE SODIUM 75 MCG: 0.07 TABLET ORAL at 05:08

## 2021-11-16 NOTE — PLAN OF CARE
Goal Outcome Evaluation:               Pt was signed of by cardio and will be going home with [facility] home health visiting. Pt is A/Ox4. Will continue to monitor.

## 2021-11-16 NOTE — NURSING NOTE
Patient said family was coming to pick patient up at 1800. RN contacted patient's son, whom said he was in [place] and was not aware of patient needing a ride. Patient said she did not have any money for cab. House Supervisor contacted for cab voucher.

## 2021-11-16 NOTE — CONSULTS
Referring Provider: Hospitalist  Reason for Consultation: TIA    Patient Care Team:  Anayeli Costa APRN as PCP - General (Family Medicine)  Barrett Evans MD as Consulting Physician (Cardiology)    Chief complaint weakness    Subjective .     History of present illness:  Catalina Moreno is a 81 y.o. female with history of paroxysmal atrial fibrillation history of hypertension hyperlipidemia presented to the hospital with complaints of left-sided weakness and slurred speech and confusion.  Patient had a work-up done by the neurologist and she did not have a stroke and possibly had a TIA.  Patient had a CT scan of the head which showed no acute abnormalities.  She had a CTA of the head and neck which showed no significant stenosis of the carotid arteries and she had a MRI of the brain also.  She had an echocardiogram which showed normal V function.  She has atrial fibrillation for which she takes medications including Eliquis and metoprolol.  Cardiology consult was called because her heart rates were high.  Patient does not have any symptoms of chest pain or shortness of breath.  No rubs any PND orthopnea.  No palpitation dizziness syncope or swelling of the feet.  She is been take her medicines regularly.  She does not smoke.    Review of Systems   Constitutional: Negative for fever and malaise/fatigue.   HENT: Negative for ear pain and nosebleeds.    Eyes: Negative for blurred vision and double vision.   Cardiovascular: Negative for chest pain, dyspnea on exertion and palpitations.   Respiratory: Negative for cough and shortness of breath.    Skin: Negative for rash.   Musculoskeletal: Negative for joint pain.   Gastrointestinal: Negative for abdominal pain, nausea and vomiting.   Neurological: Positive for focal weakness. Negative for headaches.   Psychiatric/Behavioral: Negative for depression. The patient is not nervous/anxious.    All other systems reviewed and are  negative.      History  Past Medical History:   Diagnosis Date   • Anxiety    • Arthritis    • Atrial fibrillation (CMS/HCC)     paroxysmal   • Broken shoulder     left-- due to fall 11-7-19 was at [facility]   • Buttock pain     rt side   • DDD (degenerative disc disease), lumbar    • Depression    • Edema     9/2020 foot   • GERD (gastroesophageal reflux disease)    • H/O fall    • Hip pain     rt   • Hypertension    • Hypothyroidism    • Insomnia    • Leg pain     rt   • Low back pain    • Neuropathy    • PONV (postoperative nausea and vomiting)    • Pulmonary hypertension (CMS/HCC)    • Radiculopathy    • Restless legs    • Spondylolisthesis    • Urinary incontinence      Past Surgical History:   Procedure Laterality Date   • BACK SURGERY  07/19/2018    PDC &  PSF L3-L5 insitu   • CARDIAC CATHETERIZATION N/A 4/2/2021    Procedure: Cardiac Catheterization/Vascular Study;  Surgeon: Barrett Evans MD;  Location: [facility] INVASIVE LOC[facility];  Service: Cardiovascular;  Laterality: N/A;   • CHOLECYSTECTOMY     • COLONOSCOPY     • HYSTERECTOMY     • ROTATOR CUFF REPAIR Left      Family History   Problem Relation Age of Onset   • Cancer Father    • Diabetes Son    • Cancer Paternal Aunt    • Heart disease Paternal Aunt    • Cancer Paternal Uncle        Social History     Tobacco Use   • Smoking status: Never Smoker   • Smokeless tobacco: Never Used   Vaping Use   • Vaping Use: Never used   Substance Use Topics   • Alcohol use: No   • Drug use: No        Medications Prior to Admission   Medication Sig Dispense Refill Last Dose   • apixaban (ELIQUIS) 5 MG tablet tablet Take 1 tablet by mouth Every 12 (Twelve) Hours. 180 tablet 3 11/12/2021 at Unknown time   • cyclobenzaprine (FLEXERIL) 5 MG tablet Take 1 tablet by mouth 2 (Two) Times a Day As Needed for Muscle Spasms. 60 tablet 0 11/12/2021 at Unknown time   • digoxin (LANOXIN) 125 MCG tablet Take 1 tablet by mouth Daily. 30 tablet 0 11/12/2021 at Unknown time    • famotidine (PEPCID) 20 MG tablet Take 20 mg by mouth Daily.  0 11/12/2021 at Unknown time   • furosemide (LASIX) 40 MG tablet Take 40 mg by mouth 2 (Two) Times a Day. Uses for leg swelling   Patient Taking Differently at Unknown time   • gabapentin (NEURONTIN) 600 MG tablet TAKE 1 TABLET BY MOUTH FOUR TIMES DAILY (Patient taking differently: 2 (Two) Times a Day.) 120 tablet 11 Patient Taking Differently at Unknown time   • metoprolol succinate XL (TOPROL-XL) 50 MG 24 hr tablet TAKE 1 TABLET BY MOUTH DAILY. CALL MEDICAL DOCTOR OFFICE TO MAKE AND KEEP APPOINTMENT FOR FURTHER REFILLS 90 tablet 0 11/12/2021 at Unknown time   • pantoprazole (PROTONIX) 40 MG EC tablet Take 40 mg by mouth Daily With Lunch. Afternoons  0 11/12/2021 at Unknown time   • rOPINIRole (REQUIP) 0.25 MG tablet Take 1 tablet by mouth Every Night. Take 1 hour before bedtime 30 tablet 5 11/12/2021 at Unknown time   • traZODone (DESYREL) 50 MG tablet Take 25 mg by mouth Every Night.   11/12/2021 at Unknown time   • HYDROcodone-acetaminophen (NORCO) 7.5-325 MG per tablet Take 1 tablet by mouth 4 (Four) Times a Day As Needed for Severe Pain . DNF before 10/12/2021 120 tablet 0    • levothyroxine (SYNTHROID, LEVOTHROID) 75 MCG tablet Take 75 mcg by mouth Daily. Take preop  0    • lidocaine (LIDODERM) 5 % Place 1 patch on the skin as directed by provider Daily As Needed for Mild Pain . Remove & Discard patch within 12 hours or as directed by MD      • sertraline (ZOLOFT) 50 MG tablet Take 50 mg by mouth Every Night.   Unknown at Unknown time   • sildenafil (REVATIO) 20 MG tablet Take 20 mg by mouth 2 (Two) Times a Day.   Unknown at Unknown time         Baclofen, Codeine, Ibuprofen, Methocarbamol, Naproxen, Tizanidine hcl, and Tolmetin    Scheduled Meds:apixaban, 5 mg, Oral, Q12H  atorvastatin, 80 mg, Oral, Nightly  digoxin, 125 mcg, Oral, Daily  famotidine, 20 mg, Oral, Daily  gabapentin, 600 mg, Oral, Q12H  levothyroxine, 75 mcg, Oral,  "Daily  metoprolol succinate XL, 50 mg, Oral, Q24H  rOPINIRole, 0.25 mg, Oral, Nightly  sertraline, 50 mg, Oral, Nightly  sildenafil, 20 mg, Oral, BID  traZODone, 25 mg, Oral, Nightly  vitamin B-12, 1,000 mcg, Oral, Daily      Continuous Infusions:   PRN Meds:.cyclobenzaprine  •  HYDROcodone-acetaminophen  •  lidocaine  •  sodium chloride    Objective     VITAL SIGNS  Vitals:    11/15/21 0402 11/15/21 0605 11/15/21 1419 11/15/21 1804   BP:  117/75 110/56 112/57   BP Location:  Left arm Left arm Right arm   Patient Position:  Lying Lying Lying   Pulse: 108 91 117 100   Resp:  15 18 18   Temp:  97.7 °F (36.5 °C) 97.9 °F (36.6 °C) 98.2 °F (36.8 °C)   TempSrc:  Oral Oral Oral   SpO2:       Weight:  79.4 kg (175 lb 0.7 oz)     Height:           Flowsheet Rows      First Filed Value   Admission Height 162.6 cm (64\") Documented at 11/13/2021 1718   Admission Weight 79.4 kg (175 lb) Documented at 11/13/2021 1718           TELEMETRY: Atrial fibrillation with rapid ventricle response    Physical Exam:  Constitutional:       Appearance: Well-developed.   Eyes:      General: No scleral icterus.     Conjunctiva/sclera: Conjunctivae normal.      Pupils: Pupils are equal, round, and reactive to light.   HENT:      Head: Normocephalic and atraumatic.   Neck:      Vascular: No carotid bruit or JVD.   Pulmonary:      Effort: Pulmonary effort is normal.      Breath sounds: Normal breath sounds. No wheezing. No rales.   Cardiovascular:      Normal rate. Irregularly irregular rhythm.      Murmurs: There is a systolic murmur.   Pulses:     Intact distal pulses.   Abdominal:      General: Bowel sounds are normal.      Palpations: Abdomen is soft.   Musculoskeletal: Normal range of motion.      Cervical back: Normal range of motion and neck supple. Skin:     General: Skin is warm and dry.      Findings: No rash.   Neurological:      Mental Status: Alert.      Comments: No focal deficits          Results Review:   I reviewed the patient's " new clinical results.  Lab Results (last 24 hours)     Procedure Component Value Units Date/Time    Blood Culture - Blood, Arm, Left [[id number]]  (Normal) Collected: 11/13/21 1835    Specimen: Blood from Arm, Left Updated: 11/15/21 1846     Blood Culture No growth at 2 days    Blood Culture - Blood, Arm, Right [[id number]]  (Normal) Collected: 11/13/21 1824    Specimen: Blood from Arm, Right Updated: 11/15/21 1846     Blood Culture No growth at 2 days    Vitamin B12 [[id number]]  (Normal) Collected: 11/15/21 0716    Specimen: Blood Updated: 11/15/21 1112     Vitamin B-12 343 pg/mL     Narrative:      Results may be falsely increased if patient taking Biotin.      Hemoglobin A1c [[id number]]  (Abnormal) Collected: 11/13/21 1824    Specimen: Blood Updated: 11/15/21 1046     Hemoglobin A1C 6.0 %     Narrative:      Hemoglobin A1C Reference Range:    <5.7 %        Normal  5.7-6.4 %     Increased risk for diabetes  > 6.4 %        Diabetes       These guidelines have been recommended by the American Diabetic Association for Hgb A1c.      The following 2010 guidelines have been recommended by the American Diabetes Association for Hemoglobin A1c.    HBA1c 5.7-6.4% Increased risk for future diabetes (pre-diabetes)  HBA1c     >6.4% Diabetes      Basic Metabolic Panel [[id number]]  (Abnormal) Collected: 11/15/21 0716    Specimen: Blood Updated: 11/15/21 0757     Glucose 102 mg/dL      BUN 19 mg/dL      Creatinine 0.67 mg/dL      Sodium 140 mmol/L      Potassium 4.4 mmol/L      Chloride 105 mmol/L      CO2 25.0 mmol/L      Calcium 8.7 mg/dL      eGFR Non African Amer 85 mL/min/1.73      BUN/Creatinine Ratio 28.4     Anion Gap 10.0 mmol/L     Narrative:      GFR Normal >60  Chronic Kidney Disease <60  Kidney Failure <15      Magnesium [[id number]]  (Normal) Collected: 11/15/21 0716    Specimen: Blood Updated: 11/15/21 0742     Magnesium 2.3 mg/dL     CBC & Differential [[id number]]  (Abnormal) Collected: 11/15/21 0716    Specimen:  Blood Updated: 11/15/21 0728    Narrative:      The following orders were created for panel order CBC & Differential.  Procedure                               Abnormality         Status                     ---------                               -----------         ------                     CBC Auto Differential[[id number]]        Abnormal            Final result                 Please view results for these tests on the individual orders.    CBC Auto Differential [[id number]]  (Abnormal) Collected: 11/15/21 0716    Specimen: Blood Updated: 11/15/21 0728     WBC 5.70 10*3/mm3      RBC 3.72 10*6/mm3      Hemoglobin 10.6 g/dL      Hematocrit 32.1 %      MCV 86.3 fL      MCH 28.5 pg      MCHC 33.0 g/dL      RDW 17.7 %      RDW-SD 54.3 fl      MPV 7.9 fL      Platelets 227 10*3/mm3      Neutrophil % 36.7 %      Lymphocyte % 47.4 %      Monocyte % 9.5 %      Eosinophil % 5.0 %      Basophil % 1.4 %      Neutrophils, Absolute 2.10 10*3/mm3      Lymphocytes, Absolute 2.70 10*3/mm3      Monocytes, Absolute 0.50 10*3/mm3      Eosinophils, Absolute 0.30 10*3/mm3      Basophils, Absolute 0.10 10*3/mm3      nRBC 0.1 /100 WBC     POC Glucose Once [[id number]]  (Abnormal) Collected: 11/15/21 0603    Specimen: Blood Updated: 11/15/21 0604     Glucose 116 mg/dL      Comment: Serial Number: [id number]:  [id number]             Imaging Results (Last 24 Hours)     Procedure Component Value Units Date/Time    MRI Brain Without Contrast [[id number]] Collected: 11/14/21 1930     Updated: 11/14/21 1935    Narrative:         DATE OF EXAM:   11/14/2021 1:41 PM     PROCEDURE:   MRI BRAIN WO CONTRAST-     INDICATIONS:   Stroke, follow up; R29.90-Unspecified symptoms and signs involving the  nervous system; R53.1-Weakness; M25.50-Pain in unspecified joint;  M79.7-Fibromyalgia; I48.91-Unspecified atrial fibrillation; I50.33-Acute  on chronic diastolic (congestive) heart failure     COMPARISON:  MRI of the brain without contrast dated  01/12/2019, CTA of the head  without contrast and CTA of the head and neck with contrast from  11/13/2021     TECHNIQUE:   Routine magnetic resonance imaging of the brain was performed without  administration of contrast.     FINDINGS:   Generalized parenchymal volume loss is again noted. There is a very mild  amount of T2 signal hyperintensity in the deep white matter likely due  to chronic microvascular ischemia as previously described. There is no  MR evidence of acute hemorrhage or acute infarct, mass, mass effect or  midline shift. There is no hydrocephalus. The gray-white matter  junctions are preserved. Craniocervical junction is maintained. The  midline structures and craniocervical junction are preserved. The  cerebellar pontine angles are symmetric. The mastoid air cells and  visualized paranasal sinuses are clear. The globes and orbital contents  are normal and symmetric. The sella and parasellar regions are  unremarkable.       Impression:      No acute intracranial abnormality or is significant change in the MR  appearance of the brain since 01/12/2019. There is a minimal amount of  white matter disease likely related to chronic microvascular ischemia.     Electronically Signed By-Can Mosquera DO On:11/14/2021 7:33 PM  This report was finalized on [id number] by  Can Mosquera DO.          EKG      I personally viewed and interpreted the patient's EKG/Telemetry data:    ECHOCARDIOGRAM:      STRESS MYOVIEW:    CARDIAC CATHETERIZATION:    OTHER:         Assessment/Plan     Principal Problem:    Stroke-like symptoms  Active Problems:    Neuropathic pain    Depressive disorder    Gastroesophageal reflux disease    Essential hypertension    Hypothyroidism    Atrial fibrillation (HCC)    Stage 2 chronic kidney disease    Chronic low back pain    Polypharmacy    Generalized anxiety disorder    Restless legs syndrome    Paroxysmal atrial fibrillation (HCC)    Essential (primary) hypertension    Chronic  diastolic CHF (congestive heart failure) (HCC)    Atrial fibrillation with rapid ventricular response (HCC)      Patient presents with TIA-like symptoms and is ruled out for a stroke and is seen by neurologist  Patient has history of atrial fibrillation is on Eliquis and metoprolol  Patient's heart rate is high and hence will start on digoxin and we will watch her heart rhythm and rate  Patient is ruled out for MI by get enzymes  Patient had echocardiogram which showed normal LV systolic function  Patient is also on Lipitor for hyperlipidemia  Patient blood pressure is currently stable on medications  No further cardiac work-up    I discussed the patients findings and my recommendations with patient and nurse    Flash Gonzalez MD  11/15/21  19:09 EST

## 2021-11-16 NOTE — SIGNIFICANT NOTE
11/16/21 1337   OTHER   Discipline physical therapist   Rehab Time/Intention   Session Not Performed other (see comments)  (hospital d/c pending per RN)   Recommendation   PT - Next Appointment 11/17/21

## 2021-11-16 NOTE — CASE MANAGEMENT/SOCIAL WORK
Continued Stay Note  [facility]     Patient Name: Catalina Moreno  MRN: [id number]  Today's Date: 11/16/2021    Admit Date: 11/13/2021     Discharge Plan     Row Name 11/16/21 1134       Plan    Plan D/C Plan: Home with [facility] (accepted, order placed).    Plan Comments  spoke to patient at bedside wearing mask and goggles and keeping distance greater than 6 feet and spent less than 15 minutes in room. IMM letter reviewed with patient, verbal consent obtained and copy left at bedside.                Expected Discharge Date and Time     Expected Discharge Date Expected Discharge Time    Nov 16, 2021             Maite Silva

## 2021-11-16 NOTE — PROGRESS NOTES
"Referring Provider: Hospitalist    Reason for follow-up: TIA     Patient Care Team:  Anayeli Costa APRN as PCP - General (Family Medicine)  Barrett Evans MD as Consulting Physician (Cardiology)    Subjective .  Patient doing well without any symptoms    Objective  Lying in bed comfortably     Review of Systems   Constitutional: Negative for fever and malaise/fatigue.   HENT: Negative for ear pain and nosebleeds.    Eyes: Negative for blurred vision and double vision.   Cardiovascular: Negative for chest pain, dyspnea on exertion and palpitations.   Respiratory: Negative for cough and shortness of breath.    Skin: Negative for rash.   Musculoskeletal: Negative for joint pain.   Gastrointestinal: Negative for abdominal pain, nausea and vomiting.   Neurological: Negative for focal weakness and headaches.   Psychiatric/Behavioral: Negative for depression. The patient is not nervous/anxious.    All other systems reviewed and are negative.      Baclofen, Codeine, Ibuprofen, Methocarbamol, Naproxen, Tizanidine hcl, and Tolmetin    Scheduled Meds:apixaban, 5 mg, Oral, Q12H  atorvastatin, 80 mg, Oral, Nightly  digoxin, 125 mcg, Oral, Daily  famotidine, 20 mg, Oral, Daily  gabapentin, 600 mg, Oral, Q12H  levothyroxine, 75 mcg, Oral, Daily  metoprolol succinate XL, 50 mg, Oral, Q24H  rOPINIRole, 0.25 mg, Oral, Nightly  sertraline, 50 mg, Oral, Nightly  sildenafil, 20 mg, Oral, BID  traZODone, 25 mg, Oral, Nightly  vitamin B-12, 1,000 mcg, Oral, Daily      Continuous Infusions:   PRN Meds:.cyclobenzaprine  •  HYDROcodone-acetaminophen  •  lidocaine  •  sodium chloride        VITAL SIGNS  Vitals:    11/16/21 1500 11/16/21 1530 11/16/21 1600 11/16/21 1630   BP:   106/42    BP Location:       Patient Position:       Pulse: 100 80 99 90   Resp:       Temp:       TempSrc:       SpO2:       Weight:       Height:           Flowsheet Rows      First Filed Value   Admission Height 162.6 cm (64\") Documented at " 11/13/2021 1718   Admission Weight 79.4 kg (175 lb) Documented at 11/13/2021 1718           TELEMETRY: Atrial fibrillation with controlled ventricular response    Physical Exam:  Constitutional:       Appearance: Well-developed.   Eyes:      General: No scleral icterus.     Conjunctiva/sclera: Conjunctivae normal.      Pupils: Pupils are equal, round, and reactive to light.   HENT:      Head: Normocephalic and atraumatic.   Neck:      Vascular: No carotid bruit or JVD.   Pulmonary:      Effort: Pulmonary effort is normal.      Breath sounds: Normal breath sounds. No wheezing. No rales.   Cardiovascular:      Normal rate. Irregularly irregular rhythm.   Pulses:     Intact distal pulses.   Abdominal:      General: Bowel sounds are normal.      Palpations: Abdomen is soft.   Musculoskeletal: Normal range of motion.      Cervical back: Normal range of motion and neck supple. Skin:     General: Skin is warm and dry.      Findings: No rash.   Neurological:      Mental Status: Alert.      Comments: No focal deficits          Results Review:   I reviewed the patient's new clinical results.  Lab Results (last 24 hours)     Procedure Component Value Units Date/Time    Blood Culture - Blood, Arm, Left [[id number]]  (Normal) Collected: 11/13/21 1835    Specimen: Blood from Arm, Left Updated: 11/16/21 1846     Blood Culture No growth at 3 days    Blood Culture - Blood, Arm, Right [[id number]]  (Normal) Collected: 11/13/21 1824    Specimen: Blood from Arm, Right Updated: 11/16/21 1846     Blood Culture No growth at 3 days    Basic Metabolic Panel [[id number]]  (Abnormal) Collected: 11/16/21 0356    Specimen: Blood Updated: 11/16/21 0502     Glucose 128 mg/dL      BUN 16 mg/dL      Creatinine 0.76 mg/dL      Sodium 142 mmol/L      Potassium 4.4 mmol/L      Chloride 106 mmol/L      CO2 24.0 mmol/L      Calcium 8.4 mg/dL      eGFR Non African Amer 73 mL/min/1.73      BUN/Creatinine Ratio 21.1     Anion Gap 12.0 mmol/L     Narrative:       GFR Normal >60  Chronic Kidney Disease <60  Kidney Failure <15      Digoxin Level [[id number]]  (Abnormal) Collected: 11/16/21 0356    Specimen: Blood Updated: 11/16/21 0502     Digoxin 1.50 ng/mL     CBC (No Diff) [[id number]]  (Abnormal) Collected: 11/16/21 0356    Specimen: Blood Updated: 11/16/21 0441     WBC 5.30 10*3/mm3      RBC 3.39 10*6/mm3      Hemoglobin 9.8 g/dL      Hematocrit 29.6 %      MCV 87.4 fL      MCH 29.0 pg      MCHC 33.2 g/dL      RDW 17.5 %      RDW-SD 52.5 fl      MPV 8.2 fL      Platelets 224 10*3/mm3           Imaging Results (Last 24 Hours)     ** No results found for the last 24 hours. **          EKG      I personally viewed and interpreted the patient's EKG/Telemetry data:    ECHOCARDIOGRAM:    STRESS MYOVIEW:    CARDIAC CATHETERIZATION:    OTHER:         Assessment/Plan     Active Problems:    Neuropathic pain    Depressive disorder    Gastroesophageal reflux disease    Essential hypertension    Hypothyroidism    Atrial fibrillation (HCC)    Stage 2 chronic kidney disease    Chronic low back pain    Polypharmacy    Generalized anxiety disorder    Restless legs syndrome    Paroxysmal atrial fibrillation (HCC)    Essential (primary) hypertension    Chronic diastolic CHF (congestive heart failure) (HCC)       Patient presents with TIA-like symptoms and is ruled out for stroke by a neurologist  Patient has history of atrial fibrillation her heart rate is high and hence she is started on digoxin along with metoprolol at home dose.  Patient is also on Eliquis for anticoagulation  Patient had an echocardiogram which showed normal function  Patient blood pressure is currently stable on medical therapy  No further cardiac work-up  We will follow her as an outpatient    I discussed the patients findings and my recommendations with patient and nurse    Flash Gonzalez MD  11/16/21  18:50 EST

## 2021-11-16 NOTE — PLAN OF CARE
Patient's vitals stable.  NIH was a 0.  Patient complains of bilateral lower extremity pain that temporarily responds to PRN oral pain medication.  All needs met.   Problem: Fall Injury Risk  Goal: Absence of Fall and Fall-Related Injury  Intervention: Promote Injury-Free Environment  Recent Flowsheet Documentation  Taken 11/15/2021 2323 by Krystal Hartman RN  Safety Promotion/Fall Prevention:   safety round/check completed   room organization consistent   nonskid shoes/slippers when out of bed  Taken 11/15/2021 2200 by Krystal Hartman RN  Safety Promotion/Fall Prevention:   room organization consistent   safety round/check completed   nonskid shoes/slippers when out of bed   activity supervised   assistive device/personal items within reach   clutter free environment maintained   elopement precautions   fall prevention program maintained  Taken 11/15/2021 2000 by Krystal Hartman RN  Safety Promotion/Fall Prevention:   safety round/check completed   room organization consistent   nonskid shoes/slippers when out of bed     Problem: Adult Inpatient Plan of Care  Goal: Absence of Hospital-Acquired Illness or Injury  Intervention: Identify and Manage Fall Risk  Recent Flowsheet Documentation  Taken 11/15/2021 2323 by Krystal Hartman RN  Safety Promotion/Fall Prevention:   safety round/check completed   room organization consistent   nonskid shoes/slippers when out of bed  Taken 11/15/2021 2200 by Krystal Hartman RN  Safety Promotion/Fall Prevention:   room organization consistent   safety round/check completed   nonskid shoes/slippers when out of bed   activity supervised   assistive device/personal items within reach   clutter free environment maintained   elopement precautions   fall prevention program maintained  Taken 11/15/2021 2000 by Krystal Hartman RN  Safety Promotion/Fall Prevention:   safety round/check completed   room organization consistent   nonskid shoes/slippers when out of  bed  Intervention: Prevent Skin Injury  Recent Flowsheet Documentation  Taken 11/15/2021 2000 by Krystal Hartman RN  Skin Protection:   adhesive use limited   incontinence pads utilized  Goal: Optimal Comfort and Wellbeing  Intervention: Provide Person-Centered Care  Recent Flowsheet Documentation  Taken 11/15/2021 2323 by Krystal Hartman RN  Trust Relationship/Rapport:   care explained   questions answered   questions encouraged   thoughts/feelings acknowledged  Taken 11/15/2021 2000 by Krystal Hartman RN  Trust Relationship/Rapport:   care explained   questions answered   questions encouraged   thoughts/feelings acknowledged     Problem: Pain Chronic (Persistent) (Comorbidity Management)  Goal: Acceptable Pain Control and Functional Ability  Intervention: Manage Persistent Pain  Recent Flowsheet Documentation  Taken 11/15/2021 2000 by Krystal Hartman RN  Sleep/Rest Enhancement: consistent schedule promoted  Intervention: Optimize Psychosocial Wellbeing  Recent Flowsheet Documentation  Taken 11/15/2021 2323 by Krystal Hartman RN  Supportive Measures: active listening utilized  Diversional Activities: television  Taken 11/15/2021 2000 by Krystal Hartman RN  Supportive Measures: active listening utilized  Diversional Activities: television   Goal Outcome Evaluation:

## 2021-11-17 ENCOUNTER — HOME CARE VISIT (OUTPATIENT)
Dept: HOME HEALTH SERVICES | Facility: HOME HEALTHCARE | Age: 81
End: 2021-11-17

## 2021-11-17 ENCOUNTER — READMISSION MANAGEMENT (OUTPATIENT)
Dept: CALL CENTER | Facility: HOSPITAL | Age: 81
End: 2021-11-17

## 2021-11-17 VITALS
TEMPERATURE: 98.2 F | OXYGEN SATURATION: 98 % | SYSTOLIC BLOOD PRESSURE: 160 MMHG | BODY MASS INDEX: 28.02 KG/M2 | WEIGHT: 158.13 LBS | HEIGHT: 63 IN | HEART RATE: 96 BPM | RESPIRATION RATE: 20 BRPM | DIASTOLIC BLOOD PRESSURE: 80 MMHG

## 2021-11-17 PROCEDURE — G0299 HHS/HOSPICE OF RN EA 15 MIN: HCPCS

## 2021-11-17 NOTE — PAYOR COMM NOTE
"Attention Monica Forrester nurse.    Cancelled inpatient case# [id number]    Observation order 11/13/21 attached  -------  THANK YOU,    CASTRO Head, RN  Utilization Review  [facility]  Phone: [phone]  Fax: [phone]      NPI: [id number]  Tax ID: [id number]    Catalina Carrera (81 y.o. Female)             Date of Birth Social Security Number Address Home Phone MRN    1940  [id number] [address] [phone] [id number]    [facility] Mar[facility] Status             None        Admission Date Admission Type Admitting Provider Attending Provider Department, Room/Bed    11/13/21 Emergency Shahla Cardenas MD  [facility] NEURO HEART, 262/1    Discharge Date Discharge Disposition Discharge Destination          11/16/2021 Home-Health Care Svc              Attending Provider: (none)   Allergies: Baclofen, Codeine, Ibuprofen, Methocarbamol, Naproxen, Tizanidine Hcl, Tolmetin    Isolation: None   Infection: None   Code Status: Prior   Advance Care Planning Activity    Ht: 162.6 cm (64\")   Wt: 77.4 kg (170 lb 10.2 oz)    Admission Cmt: None   Principal Problem: Stroke-like symptoms [R29.90]                 Active Insurance as of 11/13/2021     Primary Coverage     Payor Plan Insurance Group Employer/Plan Group    HUMANA MEDICARE REPLACEMENT HUMANA MEDICARE REPLACEMENT [id number]     Payor Plan Address Payor Plan Phone Number Payor Plan Fax Number Effective Dates    [address] [address] [phone]  1/1/2019 - None Entered    [facility] [id number]       Subscriber Name Subscriber Birth Date Member ID       CATALINA CARRERA 1940 [id number]                 Emergency Contacts      (Rel.) Home Phone Work Phone Mobile Phone    MADHAV DEAN (Son) [phone] -- [phone]            Orders (active)      Start     Ordered    11/17/21 0000  vitamin B-12 (VITAMIN B-12) 1000 MCG tablet  Daily         11/16/21 1632    11/16/21 0000  metoprolol succinate XL " (TOPROL-XL) 50 MG 24 hr tablet  Every 24 Hours Scheduled         11/15/21 1418    11/16/21 0000  Discharge Follow-up with Specified Provider: Cardiology - Dr. Gonzalez; 3 Weeks         11/16/21 1632    11/15/21 0000  atorvastatin (LIPITOR) 10 MG tablet  Nightly         11/15/21 1418    11/15/21 0000  Diet: Cardiac         11/15/21 1418    11/15/21 0000  Activity as Tolerated         11/15/21 1418    11/15/21 0000  Discharge Follow-up with PCP         11/15/21 1418    11/15/21 0000  Call MD With Problems / Concerns        Comments: Instructions: Call [phone] or email [email or web address] for problems or concerns.    11/15/21 1418    11/15/21 0000  Discharge Follow-up with Specialty: Pain management         11/15/21 1418    11/15/21 0000  Discharge Follow-up with Specified Provider: Neurology -- per PCP or patient choice; 1 Month         11/15/21 1418    11/15/21 0000  Ambulatory Referral to Home Health         11/15/21 1429    11/14/21 0000  Ambulatory Referral to Home Health         11/14/21 1300                Discharge Summary    No notes of this type exist for this encounter.

## 2021-11-17 NOTE — OUTREACH NOTE
Prep Survey      Responses   [facility] facility patient discharged from? [facility]   Is LACE score < 7 ? No   Emergency Room discharge w/ pulse ox? No   Eligibility Readm Mgmt   Discharge diagnosis Left sided weakness, slurred speech, confusion---resolved. No acute stroke. Possible TIA   Does the patient have one of the following disease processes/diagnoses(primary or secondary)? Stroke (TIA)   Does the patient have Home health ordered? Yes   What is the Home health agency?  [facility]    Is there a DME ordered? No   General alerts for this patient Hx: CHF   Prep survey completed? Yes          Taylor Menchaca RN

## 2021-11-18 ENCOUNTER — HOME CARE VISIT (OUTPATIENT)
Dept: HOME HEALTH SERVICES | Facility: HOME HEALTHCARE | Age: 81
End: 2021-11-18

## 2021-11-18 VITALS
TEMPERATURE: 98 F | OXYGEN SATURATION: 98 % | SYSTOLIC BLOOD PRESSURE: 144 MMHG | RESPIRATION RATE: 18 BRPM | HEART RATE: 96 BPM | DIASTOLIC BLOOD PRESSURE: 86 MMHG

## 2021-11-18 LAB
BACTERIA SPEC AEROBE CULT: NORMAL
BACTERIA SPEC AEROBE CULT: NORMAL

## 2021-11-18 PROCEDURE — G0151 HHCP-SERV OF PT,EA 15 MIN: HCPCS

## 2021-11-18 NOTE — HOME HEALTH
81 year old female lives in single level home with ground entry. Son occassionally there when in town from driving truck Has another son who lives about 20 minutes away does yardwork. Reports she has hard time getting rides sometimes they called local son yesterday to take her home from hospital and ended up coming home in cab as they could not reach him he told her today his cell phone not working right. Went to hospital via EMS reports she was having chest pain short of breath and weakness treated for Atrial fib. Met SN at door today gait slow steady not using rollator. Pt well known to nurse moving better than normally more alert has been getting meds correctly in hospital. Rev. meds today several need refills called to [facility] NP that needed called in call rec. from her MA that pt needs to make appointment to see her has not seen in about 1 year cancels visits or no show. Inst. pt she must keep appt. that primary practice needs to see her and she is able to make pain management appts so they expect her to come to their appointments reports she will go. Appt. made for Friday 11/19/2021 Inst. of need to get meds that hospital called in for her at local drug store. Voiced understanding. Denies falls in recent past. Main focus of care cardiopulmonary assessment r.t. recent episode of atrial fib. Assess compliance with medications. assistive device. Agreeable to PT eval.     HTN   COPD  Back pain   anticoagulant

## 2021-11-19 ENCOUNTER — READMISSION MANAGEMENT (OUTPATIENT)
Dept: CALL CENTER | Facility: HOSPITAL | Age: 81
End: 2021-11-19

## 2021-11-19 NOTE — HOME HEALTH
PT Evaluation Summary: The patient is an 81 year old female admitted to home health services by skilled nursing on 11/17/2021 after hospital visit and treatment of shortness of breath due to Afib.    Past Medical History includes: Hypertension, COPD, chronic back pain, long term anticoagulant therapy.    Social History: Lives in single level home alone but with regular visits from son. Depends upon son or other family for transportation.    PT assessment this day (11/18/2021) reveals the problems of general lower extremity weakness (4/5) that is noted to negatively impact gait and transfers, impaired transfers (requires stand-by assistance), impaired ambulation (requires stand-by assistance to ambulate 100 feet).    The patient will benefit from the PT interventions of therapeutic exercise, gait training, transfer training and patient education (including home safety and home exercise program (HEP) instruction).    Rehab potential good due to patient able to participate well in PT session this day.    Plan is to discharge with HEP to care of self and intermittent supervision/assist from family.    Session Notes: Patient answers the door without assistive device. Patient reports her primary problem now is exhaustion. Reminded patient that she reportedly has appointment with her primary caregiver tomorrow (11/19/2021), Initially patient denies knowledge of any upcoming visits. Then she asserts that she does not like to go to the doctor if she is not sick. Subsequently the patient produces a note sitting on table in front of her that indicates she has an appointment tomorrow, 11/19/2021 at 10am. When questioned about her need for PT, patient reports that she'd like to be able to return to doing her daily activities as before. Patient additionally admits to poor nutrition (has not yet eaten today - visit starting at 12:29 PM).    Reviewed recommended PT Plan of Care: advised that patient must ensure that she is getting  good nutrition if she is to improve (strength and endurance). Advised PT recommends continued home exercise program (HEP) performance for the next 10-14 days with work on improving nutrition. Then PT to return to review and advance HEP as indicated. Patient consents to this plan.    Plan for next visit: Advance exercise program per patient ability.

## 2021-11-19 NOTE — OUTREACH NOTE
Stroke Week 1 Survey      Responses   [facility] facility patient discharged from? [facility]   Does the patient have one of the following disease processes/diagnoses(primary or secondary)? Stroke (TIA)   Week 1 attempt successful? No   Unsuccessful attempts Attempt 1          Lucila Carpio RN

## 2021-11-22 ENCOUNTER — READMISSION MANAGEMENT (OUTPATIENT)
Dept: CALL CENTER | Facility: HOSPITAL | Age: 81
End: 2021-11-22

## 2021-11-22 NOTE — OUTREACH NOTE
Stroke Week 1 Survey      Responses   [facility] patient discharged from? [facility]   Does the patient have one of the following disease processes/diagnoses(primary or secondary)? Stroke (TIA)   Week 1 attempt successful? Yes   Call start time 1206   Call end time 1211   General alerts for this patient Hx: CHF   Discharge diagnosis Left sided weakness, slurred speech, confusion---resolved. No acute stroke. Possible TIA   Is patient permission given to speak with other caregiver? Yes   List who call center can speak with son   Meds reviewed with patient/caregiver? Yes   Is the patient having any side effects they believe may be caused by any medication additions or changes? No   Does the patient have all medications ordered at discharge? Yes   Is the patient taking all medications as directed (includes completed medication regime)? Yes   Does the patient have a primary care provider?  Yes   Does the patient have an appointment with their PCP within 7 days of discharge? Yes   Has the patient kept scheduled appointments due by today? N/A   What is the Home health agency?  [facility]    Has home health visited the patient within 72 hours of discharge? Yes   DME comments uses a rollator for ambulation at baseline.   Psychosocial issues? No   Does the patient require any assistance with activities of daily living such as eating, bathing, dressing, walking, etc.? No   Does the patient have any residual symptoms from stroke/TIA? Yes   Residual symptoms comments Left side weakness remain, able to use that side of body. Speech has improved she reports.States that she remains confused and forgetting things.    Does the patient understand the diet ordered at discharge? Yes   Comments she needs cataract surgery and has difficulty seeing currently r/t this issue.    Did the patient receive a copy of their discharge instructions? Yes   Nursing interventions Reviewed instructions with patient   What is the patient's perception of  their health status since discharge? Improving   Nursing interventions Nurse provided patient education   Is the patient/caregiver able to teach back the risk factors for a stroke? High blood pressure-goal below 120/80   Is the patient/caregiver able to teach back signs and symptoms related to disease process for when to call PCP? Yes   Is the patient/caregiver able to teach back signs and symptoms related to disease process for when to call 911? Yes   If the patient is a current smoker, are they able to teach back resources for cessation? Not a smoker   Is the patient/caregiver able to teach back the hierarchy of who to call/visit for symptoms/problems? PCP, Specialist, Home health nurse, Urgent Care, ED, 911 Yes   Week 1 call completed? Yes          Evita Ferrara RN

## 2021-11-23 ENCOUNTER — HOME CARE VISIT (OUTPATIENT)
Dept: HOME HEALTH SERVICES | Facility: HOME HEALTHCARE | Age: 81
End: 2021-11-23

## 2021-11-23 PROCEDURE — G0299 HHS/HOSPICE OF RN EA 15 MIN: HCPCS

## 2021-11-24 VITALS
RESPIRATION RATE: 17 BRPM | HEART RATE: 88 BPM | TEMPERATURE: 98 F | OXYGEN SATURATION: 97 % | DIASTOLIC BLOOD PRESSURE: 76 MMHG | SYSTOLIC BLOOD PRESSURE: 151 MMHG

## 2021-11-24 NOTE — HOME HEALTH
Patient states that she has ran out of her pain medication. Was in the hospital and missed her pain management appointment. Call placed to [facility] and had patient put on the cancelation list so she can try to get in sooner.

## 2021-11-29 ENCOUNTER — TELEPHONE (OUTPATIENT)
Dept: PAIN MEDICINE | Facility: CLINIC | Age: 81
End: 2021-11-29

## 2021-11-29 DIAGNOSIS — G89.4 CHRONIC PAIN SYNDROME: ICD-10-CM

## 2021-11-29 RX ORDER — HYDROCODONE BITARTRATE AND ACETAMINOPHEN 7.5; 325 MG/1; MG/1
1 TABLET ORAL 4 TIMES DAILY PRN
Qty: 120 TABLET | Refills: 0 | Status: SHIPPED | OUTPATIENT
Start: 2021-11-29 | End: 2021-12-02 | Stop reason: SDUPTHER

## 2021-11-30 ENCOUNTER — HOME CARE VISIT (OUTPATIENT)
Dept: HOME HEALTH SERVICES | Facility: HOME HEALTHCARE | Age: 81
End: 2021-11-30

## 2021-11-30 ENCOUNTER — READMISSION MANAGEMENT (OUTPATIENT)
Dept: CALL CENTER | Facility: HOSPITAL | Age: 81
End: 2021-11-30

## 2021-11-30 NOTE — OUTREACH NOTE
Stroke Week 2 Survey      Responses   [facility] facility patient discharged from? [facility]   Does the patient have one of the following disease processes/diagnoses(primary or secondary)? Stroke (TIA)   Week 2 attempt successful? No   Unsuccessful attempts Attempt 1          Liz Garcia RN

## 2021-12-02 ENCOUNTER — OFFICE VISIT (OUTPATIENT)
Dept: PAIN MEDICINE | Facility: CLINIC | Age: 81
End: 2021-12-02

## 2021-12-02 VITALS
RESPIRATION RATE: 16 BRPM | WEIGHT: 158 LBS | SYSTOLIC BLOOD PRESSURE: 159 MMHG | BODY MASS INDEX: 28 KG/M2 | HEART RATE: 128 BPM | OXYGEN SATURATION: 98 % | DIASTOLIC BLOOD PRESSURE: 85 MMHG | HEIGHT: 63 IN

## 2021-12-02 DIAGNOSIS — Z79.899 HIGH RISK MEDICATION USE: ICD-10-CM

## 2021-12-02 DIAGNOSIS — M79.2 NEUROPATHIC PAIN: ICD-10-CM

## 2021-12-02 DIAGNOSIS — G89.4 CHRONIC PAIN SYNDROME: Primary | ICD-10-CM

## 2021-12-02 DIAGNOSIS — M96.1 POSTLAMINECTOMY SYNDROME OF LUMBAR REGION: ICD-10-CM

## 2021-12-02 DIAGNOSIS — M48.062 LUMBAR STENOSIS WITH NEUROGENIC CLAUDICATION: ICD-10-CM

## 2021-12-02 DIAGNOSIS — G25.81 RESTLESS LEG: ICD-10-CM

## 2021-12-02 PROCEDURE — 99214 OFFICE O/P EST MOD 30 MIN: CPT | Performed by: ANESTHESIOLOGY

## 2021-12-02 RX ORDER — METOPROLOL SUCCINATE 200 MG/1
200 TABLET, EXTENDED RELEASE ORAL DAILY
COMMUNITY
Start: 2021-09-28 | End: 2021-12-02 | Stop reason: SDUPTHER

## 2021-12-02 RX ORDER — HYDROCODONE BITARTRATE AND ACETAMINOPHEN 7.5; 325 MG/1; MG/1
1 TABLET ORAL 4 TIMES DAILY PRN
Qty: 120 TABLET | Refills: 0 | Status: SHIPPED | OUTPATIENT
Start: 2021-12-28 | End: 2022-01-31 | Stop reason: SDUPTHER

## 2021-12-02 RX ORDER — PREGABALIN 75 MG/1
75 CAPSULE ORAL 2 TIMES DAILY
Qty: 60 CAPSULE | Refills: 1 | Status: SHIPPED | OUTPATIENT
Start: 2021-12-02 | End: 2022-02-24

## 2021-12-02 NOTE — PROGRESS NOTES
CC back pain, jonah leg pain  81-year-old female with HTN, depression, chronic polyarthralgia from osteoarthritis, chronic back pain S/p L4-5 laminectomy with bony fusion 2018/Noelle., here for follow-up.  Complains of continued bilateral lower extremity neuropathic pain especially at night.  Mild relief with Requip.  Reports no relief of gabapentin.  Continued chronic back pain bilateral lower extremity radicular pain and neurogenic claudication symptoms.  Denies saddle anesthesia, bladder or bowel incontinence.  Chronic polyarthralgia / left knee pain/ bilateral feet neuropathic pain    Still  limited in ADL due to bilateral lower extremity pain and weakness.  Ambulating with walker    Utilizes gabapentin, hydrocodone with good relief functional benefit without side effects.      L-Spine x-ray 2019 Degenerative changes of the lumbar spine with stable 6 mm anterolisthesis L4 upon L5  L-spine MRI 2019: postoperative changes are noted at L4 and L5 with laminectomies and posterior bony fusion. Degenerative changes are seen at multiple levels, greatest at L3-4, with moderate to severe spinal canal narrowing and moderate bilateral foraminal narrowing, greater on the left    C-spine CT 2017:  Degenerative changes spondylosis with central stenosis at C5-C6 and left foraminal stenosis C4/5    Pain Assessment   Location of Pain: Lower Back, R Hip, L Hip, R Leg, L Leg  Description of Pain: Dull/Aching, Throbbing, Stabbing  Previous Pain Rating : 8  Current Pain Ratin  Aggravating Factors: Activity  Alleviating Factors: Rest, Medication  Previous Treatments: Nerve Blocks/Injections, Epidural Steroids, Narcotic Pain Medication, Physical Therapy  Previous Diagnostic Studies: X-Ray, MRI    PEG Assessment   What number best describes your pain on average in the past week?10  What number best describes how, during the past week, pain has interfered with your enjoyment of life?8  What number best describes how, during the past  "week, pain has interfered with your general activity? 8     has a past medical history of Anxiety, Arthritis, Atrial fibrillation (HCC), Broken shoulder, Buttock pain, DDD (degenerative disc disease), lumbar, Depression, Edema, GERD (gastroesophageal reflux disease), H/O fall, Hip pain, Hypertension, Hypothyroidism, Insomnia, Leg pain, Low back pain, Neuropathy, PONV (postoperative nausea and vomiting), Pulmonary hypertension (HCC), Radiculopathy, Restless legs, Spondylolisthesis, and Urinary incontinence.     has a past surgical history that includes Hysterectomy; Rotator cuff repair (Left); Cholecystectomy; Colonoscopy; Back surgery (07/19/2018); and Cardiac catheterization (N/A, 4/2/2021).  .  Social History     Tobacco Use   • Smoking status: Never Smoker   • Smokeless tobacco: Never Used   Substance Use Topics   • Alcohol use: No       Review of Systems        See HPI      Vitals:    12/02/21 1054   BP: 159/85   Pulse: (!) 128   Resp: 16   SpO2: 98%   Weight: 71.7 kg (158 lb)   Height: 160 cm (63\")     Physical Exam  Vitals reviewed.   Constitutional:       General: She is not in acute distress.     Comments: walker   Pulmonary:      Effort: Pulmonary effort is normal.   Musculoskeletal:      Lumbar back: Tenderness present. Decreased range of motion. Positive right straight leg raise test and positive left straight leg raise test.     PHQ9 on chart                    opioid risk tool high risk        Assessment/Plan  Diagnoses and all orders for this visit:    1. Chronic pain syndrome (Primary)  -     HYDROcodone-acetaminophen (NORCO) 7.5-325 MG per tablet; Take 1 tablet by mouth 4 (Four) Times a Day As Needed for Severe Pain . DNF before 12/28/2021  Dispense: 120 tablet; Refill: 0    2. Postlaminectomy syndrome of lumbar region    3. Lumbar stenosis with neurogenic claudication    4. Restless leg    5. Neuropathic pain  -     pregabalin (LYRICA) 75 MG capsule; Take 1 capsule by mouth 2 (Two) Times a Day.  " Dispense: 60 capsule; Refill: 1    6. High risk medication use    Summary:   81-year-old female with HTN, depression, chronic polyarthralgia from osteoarthritis, chronic back pain from DDD/ spondylosis/stenosis, S/p L4/5 laminectomy with bony fusion 7/2018/ Majd seen in  follow-up.     Chronic lower lumbar /coccyx pain and RLE radicular pain, continued bilateral lower extremity weakness.  Consider repeat caudal/ LESI as needed    Complains of continued bilateral lower extremity neuropathic pain especially at night.  Mild relief with Requip.  Reports no relief of gabapentin.  We will stop gabapentin and start Lyrica 75 mg twice daily..    Continue hydrocodone 7.5/325 4 times daily as needed for severe pain.  UDS and Inspect reviewed.   Discussed risk of tolerance, dependence, respiratory depression, coma and death associated with use of oral opioids for treatment of chronic nonmalignant pain.      Cont Lidocaine patch for worsening myofascial and neuropathic pain.   Cont requip for RLS at night    RTC  in  2 mo.

## 2021-12-03 ENCOUNTER — HOME CARE VISIT (OUTPATIENT)
Dept: HOME HEALTH SERVICES | Facility: HOME HEALTHCARE | Age: 81
End: 2021-12-03

## 2021-12-03 VITALS
DIASTOLIC BLOOD PRESSURE: 62 MMHG | TEMPERATURE: 97.5 F | HEART RATE: 98 BPM | RESPIRATION RATE: 18 BRPM | SYSTOLIC BLOOD PRESSURE: 122 MMHG | OXYGEN SATURATION: 98 %

## 2021-12-03 PROCEDURE — G0299 HHS/HOSPICE OF RN EA 15 MIN: HCPCS

## 2021-12-04 NOTE — HOME HEALTH
Patient is reports right shoulder pain rated at a level of 8 on scale of 0-10 with 7 being a tolerable level of pain. She reports she saw her pain mangement Dr and she has some medication to  from the pharmacy and neurontin was stopped. Reviewed all medications in home and compared to medication profile. Alerted PCP to medication on med profile but not in home. No Digoxin, Trazadone, or requip in home. Patient reports leg cramps have been much worse lately. Nurse relays Anayeli Tse does not have patient taking digoxin, or Trazadone and requip needs to be refilled through pain management physician.     When looking for phone number for pain mangement physician found a pharmacy  RX form for Requip showing that it has 1 refill left on the prescription. Call placed to pharmacy to obtain ordered refill.

## 2021-12-06 ENCOUNTER — HOME CARE VISIT (OUTPATIENT)
Dept: HOME HEALTH SERVICES | Facility: HOME HEALTHCARE | Age: 81
End: 2021-12-06

## 2021-12-06 ENCOUNTER — READMISSION MANAGEMENT (OUTPATIENT)
Dept: CALL CENTER | Facility: HOSPITAL | Age: 81
End: 2021-12-06

## 2021-12-06 VITALS
TEMPERATURE: 97.8 F | SYSTOLIC BLOOD PRESSURE: 134 MMHG | RESPIRATION RATE: 15 BRPM | DIASTOLIC BLOOD PRESSURE: 60 MMHG | HEART RATE: 73 BPM | OXYGEN SATURATION: 96 %

## 2021-12-06 PROCEDURE — G0157 HHC PT ASSISTANT EA 15: HCPCS

## 2021-12-06 NOTE — OUTREACH NOTE
Stroke Week 2 Survey      Responses   [facility] patient discharged from? [facility]   Does the patient have one of the following disease processes/diagnoses(primary or secondary)? Stroke (TIA)   Week 2 attempt successful? No   Unsuccessful attempts Attempt 2          Ariadne Salas RN

## 2021-12-07 ENCOUNTER — HOME CARE VISIT (OUTPATIENT)
Dept: HOME HEALTH SERVICES | Facility: HOME HEALTHCARE | Age: 81
End: 2021-12-07

## 2021-12-07 VITALS
SYSTOLIC BLOOD PRESSURE: 136 MMHG | DIASTOLIC BLOOD PRESSURE: 80 MMHG | HEART RATE: 96 BPM | OXYGEN SATURATION: 100 % | RESPIRATION RATE: 20 BRPM | TEMPERATURE: 97.2 F

## 2021-12-07 PROCEDURE — G0299 HHS/HOSPICE OF RN EA 15 MIN: HCPCS

## 2021-12-07 NOTE — HOME HEALTH
pt up and ready for PT session,  feels fair today and with no new c/o's.     pt states she is motivated to improve and return to plf.  pt reports she has been having mild dizziness today and reports this was recently discussed with MD and no changes were made to poc.        pt was limited today in all areas with weakness and low endurance with a work/rest ratio of 50/50.      pt was minimally unsteady upon standing today and needed cues to properly position the LEs prior ot standing for safety, unsteadiness was not significant.    pt was educated on proper use of the walker with cues given to remain upright and to avoid forward flexed posture.     pt was challenged to stand beyond 1 min with c/o weakness in her LEs.    encouraged walker use at all times and educated pt on the importance of over-exertion.

## 2021-12-08 NOTE — HOME HEALTH
Reports feels sick to her stomach today no vomiting. Asked if she had gotten new dose of Thyroid said she hadnt been able to get to drug store but will see if son can  for her. Said she should have some other meds there also. Reports sleeping okay at night appetite fair due to nausea. Today in [facility] upon SN arrival moving about gait steady not using rollator. No falls since last SN visit pt reports.       Cardiopulmonary assessment    assess pain     assess nausea    see if obtained levothryroxine 88mcg and started taking     assess for falls.

## 2021-12-13 ENCOUNTER — HOME CARE VISIT (OUTPATIENT)
Dept: HOME HEALTH SERVICES | Facility: HOME HEALTHCARE | Age: 81
End: 2021-12-13

## 2021-12-13 VITALS
SYSTOLIC BLOOD PRESSURE: 120 MMHG | TEMPERATURE: 97.2 F | RESPIRATION RATE: 18 BRPM | OXYGEN SATURATION: 98 % | DIASTOLIC BLOOD PRESSURE: 84 MMHG | HEART RATE: 98 BPM

## 2021-12-13 PROCEDURE — G0151 HHCP-SERV OF PT,EA 15 MIN: HCPCS

## 2021-12-13 NOTE — HOME HEALTH
PT greeted by son outside upon arrival while visiting . Pt opened the door for this PT ambulating without AD with rollator located in the living room. Pt reports feeling better this am and tryng to get things done in the house due to reports of getting fatigued as day progresses or after multiple tasks done. PT instructed pt importance of pacing and energy conservation tech. Pt denies falls , ER use , MED and insurance changes since last clinician visit and is awaiting for pharmacy to call back if atorvastatin and sildenfil is filled. Pt also reported will call [facility] office in [place] for f/u appt. Pt ready and agreeable to this visit    30 day Pt Reassessment    Clinical condition of patient at initial or last assessment : At time of eval on 11.18.21, pt's LE strength grossly graded 4/5, 10/10 back pain with prolonged sitting and standing,  SBA with bed mobility, SBA with transfers, SBA with gait without AD x 100'.     Current clinical condition of patient: Currently pt's LE strength grossly graded 4+/5. pt denies both at rest and act during this visit but verbalized intermittent back pain  manjinder at night, indep with bed mobility, indep with sit to stand , bed to chair/ commode transfers , no shower transfers tested this date, supervision with amb without AD with occasional  slight lateral trunk sway x 200' . SaO2 98% and HR 99 bpm after gait act. Tinetti score of 22/28 without use of AD    Overall progress towards measurable treatment goals:  Patient has shown steady progress towards achieving PT goals as noted with patient's improve strength and assist level with functional tasks as well as no reported falls since SOC    Effectiveness of current plan:  Patient has shown steady progress during this therapy period. Patient has demonstrated improved strength and balance. However, patient will benefit from continued PT services to continue to improve balance,  safety with performing transfers and gait with  or  without assistive device to ensure safe mobility inside and outside residence    Plan for continuing or discontinuing service:   Patient will benefit for  2  visits to address goals stated in plan of care.    Changes to goals or care plan update to be completed in guideline section and communicated to MD:  No changes made or recommended at this time    Statement of expectation of continued progress toward goals:  Patient will reasonably meet goals in the extended therapy visits that were requested    Skilled PT needed to continue to improve safety with balance, transfers and gait with or without  assistive device to ensure safe mobility inside residence.

## 2021-12-15 ENCOUNTER — READMISSION MANAGEMENT (OUTPATIENT)
Dept: CALL CENTER | Facility: HOSPITAL | Age: 81
End: 2021-12-15

## 2021-12-15 ENCOUNTER — HOME CARE VISIT (OUTPATIENT)
Dept: HOME HEALTH SERVICES | Facility: HOME HEALTHCARE | Age: 81
End: 2021-12-15

## 2021-12-15 NOTE — OUTREACH NOTE
Stroke Week 3 Survey      Responses   [facility] patient discharged from? [facility]   Does the patient have one of the following disease processes/diagnoses(primary or secondary)? Stroke (TIA)   Week 3 attempt successful? No   Unsuccessful attempts Attempt 1          Adrienne Astorga RN

## 2021-12-16 ENCOUNTER — HOME CARE VISIT (OUTPATIENT)
Dept: HOME HEALTH SERVICES | Facility: HOME HEALTHCARE | Age: 81
End: 2021-12-16

## 2021-12-16 VITALS
OXYGEN SATURATION: 98 % | BODY MASS INDEX: 26.98 KG/M2 | WEIGHT: 158 LBS | DIASTOLIC BLOOD PRESSURE: 84 MMHG | HEIGHT: 64 IN | RESPIRATION RATE: 18 BRPM | SYSTOLIC BLOOD PRESSURE: 133 MMHG | HEART RATE: 85 BPM

## 2021-12-16 PROCEDURE — G0299 HHS/HOSPICE OF RN EA 15 MIN: HCPCS

## 2021-12-20 ENCOUNTER — READMISSION MANAGEMENT (OUTPATIENT)
Dept: CALL CENTER | Facility: HOSPITAL | Age: 81
End: 2021-12-20

## 2021-12-20 ENCOUNTER — HOME CARE VISIT (OUTPATIENT)
Dept: HOME HEALTH SERVICES | Facility: HOME HEALTHCARE | Age: 81
End: 2021-12-20

## 2021-12-20 VITALS
HEART RATE: 82 BPM | RESPIRATION RATE: 15 BRPM | TEMPERATURE: 97.9 F | SYSTOLIC BLOOD PRESSURE: 138 MMHG | OXYGEN SATURATION: 95 % | DIASTOLIC BLOOD PRESSURE: 74 MMHG

## 2021-12-20 PROCEDURE — G0157 HHC PT ASSISTANT EA 15: HCPCS

## 2021-12-20 NOTE — OUTREACH NOTE
Stroke Week 3 Survey      Responses   [facility] patient discharged from? [facility]   Does the patient have one of the following disease processes/diagnoses(primary or secondary)? Stroke (TIA)   Week 3 attempt successful? Yes   Call start time 1828   Call end time 1840   General alerts for this patient Hx: CHF   Discharge diagnosis Left sided weakness, slurred speech, confusion---resolved. No acute stroke. Possible TIA   Meds reviewed with patient/caregiver? Yes   Is the patient having any side effects they believe may be caused by any medication additions or changes? No   Does the patient have all medications ordered at discharge? Yes   Is the patient taking all medications as directed (includes completed medication regime)? Yes   Does the patient have a primary care provider?  Yes   Has the patient kept scheduled appointments due by today? Yes   What is the Home health agency?  [facility]    Has home health visited the patient within 72 hours of discharge? Yes   Home health comments Had last visit   DME comments uses a rollator for ambulation at baseline.   Psychosocial issues? No   Does the patient require any assistance with activities of daily living such as eating, bathing, dressing, walking, etc.? No   Does the patient have any residual symptoms from stroke/TIA? No   Does the patient understand the diet ordered at discharge? Yes   Did the patient receive a copy of their discharge instructions? Yes   Nursing interventions Reviewed instructions with patient   What is the patient's perception of their health status since discharge? New symptoms unrelated to diagnosis   Nursing interventions Nurse provided patient education   Is the patient able to teach back FAST for Stroke? Yes   Is the patient/caregiver able to teach back the risk factors for a stroke? High blood pressure-goal below 120/80,  High Cholesterol,  History of TIAs,  History of Afib,  Sleep apnea   Is the patient/caregiver able to teach back signs and  symptoms related to disease process for when to call PCP? Yes   Is the patient/caregiver able to teach back signs and symptoms related to disease process for when to call 911? Yes   If the patient is a current smoker, are they able to teach back resources for cessation? Not a smoker   Is the patient/caregiver able to teach back the hierarchy of who to call/visit for symptoms/problems? PCP, Specialist, Home health nurse, Urgent Care, ED, 911 Yes   Additional teach back comments States her feet and ankles are swollen.  She took an extra water pill yesterday but it has helped.  Advised to contact cardiologist or PCP regarding this.  She has kept them elevated and used epsom salt soaks but nothing has helped.  She is unable to weigh due to the pain in her feet.     Week 3 call completed? Yes   Wrap up additional comments Pt to contact PCP or cardiologist regarding swelling to feet and ankles.            Bibiana Hinojosa LPN

## 2021-12-21 NOTE — HOME HEALTH
pt up and prepared for PT session but admits she is will be very limited today with ongoing low back and LE pain.  pt reporting she is out of pain meds and is not due for a refill until 12/28.   pt reporting the pain has significantly lessened her functional capacity.   pt is motivated to improve and return to plf     pt was compliant throughout PT session but struggled at times with weakness and fatigue.  pt was given several cues today to fully contract/hold at end rom and struggled most times.  pt was cued to deep plf for energy conservation and struggled with this due to weakness.   pt was minimally unsteady upon standing and was advised to use her walker at all times.    educated pt to avoid over-exertion but to attempt hep daily with frequent ambulation as tolerated to avoid further physical decline

## 2021-12-22 ENCOUNTER — TELEPHONE (OUTPATIENT)
Dept: PAIN MEDICINE | Facility: CLINIC | Age: 81
End: 2021-12-22

## 2021-12-27 ENCOUNTER — HOME CARE VISIT (OUTPATIENT)
Dept: HOME HEALTH SERVICES | Facility: HOME HEALTHCARE | Age: 81
End: 2021-12-27

## 2021-12-28 NOTE — CASE COMMUNICATION
No answer of phone or return of voicemail left this day (12/27/2021) when attempting to arrange visit time for PT scheduled this day. Visit move to 12/31/2021.

## 2021-12-31 ENCOUNTER — HOME CARE VISIT (OUTPATIENT)
Dept: HOME HEALTH SERVICES | Facility: HOME HEALTHCARE | Age: 81
End: 2021-12-31

## 2021-12-31 VITALS
DIASTOLIC BLOOD PRESSURE: 84 MMHG | BODY MASS INDEX: 29.7 KG/M2 | WEIGHT: 173 LBS | SYSTOLIC BLOOD PRESSURE: 144 MMHG | HEART RATE: 96 BPM | RESPIRATION RATE: 18 BRPM | OXYGEN SATURATION: 98 % | TEMPERATURE: 97.6 F

## 2021-12-31 PROCEDURE — G0151 HHCP-SERV OF PT,EA 15 MIN: HCPCS

## 2021-12-31 NOTE — HOME HEALTH
PT Agency Discharge Summary: The patient is an 81 year old female admitted to home health services by skilled nursing on 11/17/2021 after hospital visit and treatment of shortness of breath due to Afib.    Initial PT assessment (11/18/2021) revealed the problems of general lower extremity weakness (4/5) that is noted to negatively impact gait and transfers, impaired transfers (requires stand-by assistance), impaired ambulation (requires stand-by assistance to ambulate 100 feet).    The patient accepted the PT interventions of therapeutic exercise, transfer training, gait training and patient education (including home safety and home exercise program (HEP) instruction) meeting all established goals.    Patient discharged from home health services by PT after session on 12/31/2021 as planned and due to all goals met.    Session Notes: Patient reports she has had her pain medication filled recently so now pain well control.    Returns to pain management physician 1/28/2021.

## 2021-12-31 NOTE — CASE COMMUNICATION
PT Agency Discharge Summary: The patient is an 81 year old female admitted to home health services by skilled nursing on 11/17/2021 after hospital visit and treatment of shortness of breath due to Afib.    Initial PT assessment (11/18/2021) revealed the problems of general lower extremity weakness (4/5) that is noted to negatively impact gait and transfers, impaired transfers (requires stand-by assistance), impaired ambulation (requires  stand-by assistance to ambulate 100 feet).    The patient accepted the PT interventions of therapeutic exercise, transfer training, gait training and patient education (including home safety and home exercise program (HEP) instruction) meeting all established goals.    Patient discharged from home health services by PT after session on 12/31/2021 as planned and due to all goals met.

## 2022-01-20 ENCOUNTER — TELEPHONE (OUTPATIENT)
Dept: PAIN MEDICINE | Facility: CLINIC | Age: 82
End: 2022-01-20

## 2022-01-20 RX ORDER — CYCLOBENZAPRINE HCL 5 MG
5 TABLET ORAL 2 TIMES DAILY PRN
Qty: 60 TABLET | Refills: 0 | Status: SHIPPED | OUTPATIENT
Start: 2022-01-20 | End: 2022-02-08 | Stop reason: SDUPTHER

## 2022-01-20 NOTE — TELEPHONE ENCOUNTER
Patient is asking if you can send muscle realxer she is having muscle spasm and Gabapentin is not helping

## 2022-01-26 ENCOUNTER — HOSPITAL ENCOUNTER (EMERGENCY)
Facility: HOSPITAL | Age: 82
Discharge: HOME | End: 2022-01-26
Attending: EMERGENCY MEDICINE | Admitting: EMERGENCY MEDICINE

## 2022-01-26 VITALS
DIASTOLIC BLOOD PRESSURE: 90 MMHG | TEMPERATURE: 97.9 F | RESPIRATION RATE: 20 BRPM | BODY MASS INDEX: 26.98 KG/M2 | OXYGEN SATURATION: 97 % | WEIGHT: 158 LBS | SYSTOLIC BLOOD PRESSURE: 111 MMHG | HEIGHT: 64 IN | HEART RATE: 119 BPM

## 2022-01-26 DIAGNOSIS — M13.0 POLYARTHRITIS: Primary | ICD-10-CM

## 2022-01-26 PROCEDURE — 99282 EMERGENCY DEPT VISIT SF MDM: CPT

## 2022-01-26 RX ORDER — MELOXICAM 7.5 MG/1
7.5 TABLET ORAL DAILY
Qty: 10 TABLET | Refills: 0 | Status: SHIPPED | OUTPATIENT
Start: 2022-01-26 | End: 2022-01-26

## 2022-01-26 RX ORDER — HYDROCODONE BITARTRATE AND ACETAMINOPHEN 10; 325 MG/1; MG/1
1 TABLET ORAL EVERY 6 HOURS PRN
Qty: 10 TABLET | Refills: 0 | Status: SHIPPED | OUTPATIENT
Start: 2022-01-26 | End: 2022-01-26

## 2022-01-26 RX ORDER — MELOXICAM 7.5 MG/1
7.5 TABLET ORAL DAILY
Qty: 10 TABLET | Refills: 0 | Status: SHIPPED | OUTPATIENT
Start: 2022-01-26 | End: 2022-02-05

## 2022-01-26 RX ORDER — HYDROCODONE BITARTRATE AND ACETAMINOPHEN 10; 325 MG/1; MG/1
1 TABLET ORAL EVERY 6 HOURS PRN
Qty: 10 TABLET | Refills: 0 | Status: SHIPPED | OUTPATIENT
Start: 2022-01-26 | End: 2022-02-08

## 2022-01-28 DIAGNOSIS — G89.4 CHRONIC PAIN SYNDROME: ICD-10-CM

## 2022-01-28 DIAGNOSIS — M13.0 POLYARTHRITIS: ICD-10-CM

## 2022-01-28 RX ORDER — HYDROCODONE BITARTRATE AND ACETAMINOPHEN 7.5; 325 MG/1; MG/1
1 TABLET ORAL 4 TIMES DAILY PRN
Qty: 120 TABLET | Refills: 0 | Status: CANCELLED | OUTPATIENT
Start: 2022-01-28

## 2022-01-31 DIAGNOSIS — G89.4 CHRONIC PAIN SYNDROME: ICD-10-CM

## 2022-01-31 RX ORDER — HYDROCODONE BITARTRATE AND ACETAMINOPHEN 7.5; 325 MG/1; MG/1
1 TABLET ORAL 4 TIMES DAILY PRN
Qty: 28 TABLET | Refills: 0 | Status: SHIPPED | OUTPATIENT
Start: 2022-01-31 | End: 2022-01-31 | Stop reason: SDUPTHER

## 2022-01-31 RX ORDER — HYDROCODONE BITARTRATE AND ACETAMINOPHEN 7.5; 325 MG/1; MG/1
1 TABLET ORAL 4 TIMES DAILY PRN
Qty: 28 TABLET | Refills: 0 | Status: SHIPPED | OUTPATIENT
Start: 2022-01-31 | End: 2022-02-08 | Stop reason: SDUPTHER

## 2022-02-08 ENCOUNTER — OFFICE VISIT (OUTPATIENT)
Dept: PAIN MEDICINE | Facility: CLINIC | Age: 82
End: 2022-02-08

## 2022-02-08 VITALS
HEART RATE: 120 BPM | SYSTOLIC BLOOD PRESSURE: 140 MMHG | OXYGEN SATURATION: 96 % | RESPIRATION RATE: 16 BRPM | WEIGHT: 158 LBS | BODY MASS INDEX: 26.98 KG/M2 | DIASTOLIC BLOOD PRESSURE: 78 MMHG | HEIGHT: 64 IN

## 2022-02-08 DIAGNOSIS — Z79.899 HIGH RISK MEDICATION USE: ICD-10-CM

## 2022-02-08 DIAGNOSIS — M79.2 NEUROPATHIC PAIN: ICD-10-CM

## 2022-02-08 DIAGNOSIS — G89.4 CHRONIC PAIN SYNDROME: Primary | ICD-10-CM

## 2022-02-08 DIAGNOSIS — M96.1 POSTLAMINECTOMY SYNDROME OF LUMBAR REGION: ICD-10-CM

## 2022-02-08 DIAGNOSIS — G25.81 RESTLESS LEG: ICD-10-CM

## 2022-02-08 PROCEDURE — 99214 OFFICE O/P EST MOD 30 MIN: CPT | Performed by: ANESTHESIOLOGY

## 2022-02-08 RX ORDER — METOPROLOL SUCCINATE 200 MG/1
200 TABLET, EXTENDED RELEASE ORAL DAILY
COMMUNITY
Start: 2021-12-23 | End: 2022-03-10 | Stop reason: HOSPADM

## 2022-02-08 RX ORDER — HYDROCODONE BITARTRATE AND ACETAMINOPHEN 7.5; 325 MG/1; MG/1
1 TABLET ORAL 4 TIMES DAILY PRN
Qty: 120 TABLET | Refills: 0 | Status: SHIPPED | OUTPATIENT
Start: 2022-03-08 | End: 2022-02-08 | Stop reason: SDUPTHER

## 2022-02-08 RX ORDER — ERGOCALCIFEROL 1.25 MG/1
CAPSULE ORAL
Status: ON HOLD | COMMUNITY
End: 2022-03-07

## 2022-02-08 RX ORDER — HYDROCODONE BITARTRATE AND ACETAMINOPHEN 7.5; 325 MG/1; MG/1
1 TABLET ORAL 4 TIMES DAILY PRN
Qty: 120 TABLET | Refills: 0 | Status: SHIPPED | OUTPATIENT
Start: 2022-02-08 | End: 2022-02-08 | Stop reason: SDUPTHER

## 2022-02-08 RX ORDER — HYDROCODONE BITARTRATE AND ACETAMINOPHEN 7.5; 325 MG/1; MG/1
1 TABLET ORAL 4 TIMES DAILY PRN
Qty: 120 TABLET | Refills: 0 | Status: ON HOLD | OUTPATIENT
Start: 2022-03-08 | End: 2022-03-07 | Stop reason: SDUPTHER

## 2022-02-08 RX ORDER — HYDROCODONE BITARTRATE AND ACETAMINOPHEN 7.5; 325 MG/1; MG/1
1 TABLET ORAL 4 TIMES DAILY PRN
Qty: 120 TABLET | Refills: 0 | Status: ON HOLD | OUTPATIENT
Start: 2022-02-08 | End: 2022-03-10 | Stop reason: SDUPTHER

## 2022-02-08 RX ORDER — CYCLOBENZAPRINE HCL 5 MG
5 TABLET ORAL 2 TIMES DAILY PRN
Qty: 60 TABLET | Refills: 5 | Status: ON HOLD | OUTPATIENT
Start: 2022-02-08 | End: 2022-03-10 | Stop reason: SDUPTHER

## 2022-02-08 RX ORDER — LEVOTHYROXINE SODIUM 88 UG/1
88 TABLET ORAL DAILY
COMMUNITY

## 2022-02-08 RX ORDER — GABAPENTIN 600 MG/1
600 TABLET ORAL 4 TIMES DAILY
COMMUNITY
Start: 2022-01-24 | End: 2022-11-28

## 2022-02-08 NOTE — PROGRESS NOTES
CC back pain, jonah leg pain  81-year-old female with HTN, depression, chronic polyarthralgia from osteoarthritis, chronic back pain S/p L4-5 laminectomy with bony fusion 2018/[facility]., here for follow-up.  Denies new issues today except continued restless leg at night.  Continued chronic back pain bilateral lower extremity radicular pain and neurogenic claudication symptoms.  Denies saddle anesthesia, bladder or bowel incontinence.  Chronic polyarthralgia / left knee pain/ bilateral feet neuropathic pain    Still  limited in ADL due to bilateral lower extremity pain and weakness.  Ambulating with walker    Utilizes gabapentin, hydrocodone with good relief functional benefit without side effects.      L-Spine x-ray 2019 Degenerative changes of the lumbar spine with stable 6 mm anterolisthesis L4 upon L5  L-spine MRI 2019: postoperative changes are noted at L4 and L5 with laminectomies and posterior bony fusion. Degenerative changes are seen at multiple levels, greatest at L3-4, with moderate to severe spinal canal narrowing and moderate bilateral foraminal narrowing, greater on the left    C-spine CT 2017:  Degenerative changes spondylosis with central stenosis at C5-C6 and left foraminal stenosis C4/5    Pain Assessment   Location of Pain: Lower Back, R Hip, L Hip, R Leg, L Leg  Description of Pain: Dull/Aching, Throbbing, Stabbing  Previous Pain Rating : 8  Current Pain Ratin  Aggravating Factors: Activity  Alleviating Factors: Rest, Medication  Previous Treatments: Nerve Blocks/Injections, Epidural Steroids, Narcotic Pain Medication, Physical Therapy  Previous Diagnostic Studies: X-Ray, MRI    PEG Assessment   What number best describes your pain on average in the past week?10  What number best describes how, during the past week, pain has interfered with your enjoyment of life?8  What number best describes how, during the past week, pain has interfered with your general activity? 8     has a past medical  "history of Anxiety, Arthritis, Atrial fibrillation (HCC), Broken shoulder, Buttock pain, DDD (degenerative disc disease), lumbar, Depression, Edema, GERD (gastroesophageal reflux disease), H/O fall, Hip pain, Hypertension, Hypothyroidism, Insomnia, Leg pain, Low back pain, Neuropathy, PONV (postoperative nausea and vomiting), Pulmonary hypertension (HCC), Radiculopathy, Restless legs, Spondylolisthesis, and Urinary incontinence.     has a past surgical history that includes Hysterectomy; Rotator cuff repair (Left); Cholecystectomy; Colonoscopy; Back surgery (07/19/2018); and Cardiac catheterization (N/A, 4/2/2021).  .  Social History     Tobacco Use   • Smoking status: Never Smoker   • Smokeless tobacco: Never Used   Substance Use Topics   • Alcohol use: No       Review of Systems        See HPI      Vitals:    02/08/22 1124   BP: 140/78   Pulse: 120   Resp: 16   SpO2: 96%   Weight: 71.7 kg (158 lb)   Height: 162.6 cm (64\")     Physical Exam  Vitals reviewed.   Constitutional:       General: She is not in acute distress.     Comments: walker   Pulmonary:      Effort: Pulmonary effort is normal.   Musculoskeletal:      Lumbar back: Tenderness present. Decreased range of motion. Positive right straight leg raise test and positive left straight leg raise test.     PHQ9 on chart                    opioid risk tool high risk        Assessment/Plan  Diagnoses and all orders for this visit:    1. Chronic pain syndrome (Primary)  -     HYDROcodone-acetaminophen (NORCO) 7.5-325 MG per tablet; Take 1 tablet by mouth 4 (Four) Times a Day As Needed for Severe Pain .  Dispense: 120 tablet; Refill: 0  -     HYDROcodone-acetaminophen (NORCO) 7.5-325 MG per tablet; Take 1 tablet by mouth 4 (Four) Times a Day As Needed for Severe Pain . DNF before 3/8/2022  Dispense: 120 tablet; Refill: 0  -     cyclobenzaprine (FLEXERIL) 5 MG tablet; Take 1 tablet by mouth 2 (Two) Times a Day As Needed for Muscle Spasms.  Dispense: 60 tablet; " Refill: 5    2. Postlaminectomy syndrome of lumbar region    3. Neuropathic pain    4. Restless leg    5. High risk medication use  -     Urine Drug Screen - Urine, Clean Catch; Future    Summary:   81-year-old female with HTN, depression, chronic polyarthralgia from osteoarthritis, chronic back pain from DDD/ spondylosis/stenosis, S/p L4/5 laminectomy with bony fusion 7/2018/ [facility] seen in  follow-up.     Chronic lower lumbar /coccyx pain and RLE radicular pain, continued bilateral lower extremity weakness.  Consider repeat caudal/ LESI as needed    Denies new issues today except continued restless leg at night.  Mild relief from Lyrica no side effects.  Had no relief with gabapentin.  She is also on Requip.    Continue hydrocodone 7.5/325 4 times daily as needed for severe pain.  UDS and Inspect reviewed.   Discussed risk of tolerance, dependence, respiratory depression, coma and death associated with use of oral opioids for treatment of chronic nonmalignant pain.      Cont Lidocaine patch for worsening myofascial and neuropathic pain.   Cont requip for RLS at night    RTC  in  2 mo.

## 2022-02-23 DIAGNOSIS — M79.2 NEUROPATHIC PAIN: ICD-10-CM

## 2022-02-24 RX ORDER — PREGABALIN 75 MG/1
CAPSULE ORAL
Qty: 60 CAPSULE | Refills: 1 | Status: ON HOLD | OUTPATIENT
Start: 2022-02-24 | End: 2022-03-10 | Stop reason: SDUPTHER

## 2022-03-04 ENCOUNTER — APPOINTMENT (OUTPATIENT)
Dept: CT IMAGING | Facility: HOSPITAL | Age: 82
End: 2022-03-04

## 2022-03-04 ENCOUNTER — HOSPITAL ENCOUNTER (INPATIENT)
Facility: HOSPITAL | Age: 82
LOS: 6 days | Discharge: NURSING, REHAB OR LONG-TERM CARE | End: 2022-03-10
Attending: EMERGENCY MEDICINE | Admitting: INTERNAL MEDICINE

## 2022-03-04 DIAGNOSIS — M54.50 CHRONIC LEFT-SIDED LOW BACK PAIN WITHOUT SCIATICA: ICD-10-CM

## 2022-03-04 DIAGNOSIS — I50.33 ACUTE ON CHRONIC DIASTOLIC CONGESTIVE HEART FAILURE: ICD-10-CM

## 2022-03-04 DIAGNOSIS — R07.9 CHEST PAIN, UNSPECIFIED TYPE: ICD-10-CM

## 2022-03-04 DIAGNOSIS — G89.4 CHRONIC PAIN SYNDROME: ICD-10-CM

## 2022-03-04 DIAGNOSIS — G89.29 CHRONIC LEFT-SIDED LOW BACK PAIN WITHOUT SCIATICA: ICD-10-CM

## 2022-03-04 DIAGNOSIS — M79.2 NEUROPATHIC PAIN: ICD-10-CM

## 2022-03-04 DIAGNOSIS — I48.91 ATRIAL FIBRILLATION WITH RVR: Primary | ICD-10-CM

## 2022-03-04 LAB
ALBUMIN SERPL-MCNC: 4.3 G/DL (ref 3.5–5.2)
ALBUMIN/GLOB SERPL: 1.2 G/DL
ALP SERPL-CCNC: 114 U/L (ref 39–117)
ALT SERPL W P-5'-P-CCNC: 7 U/L (ref 1–33)
ANION GAP SERPL CALCULATED.3IONS-SCNC: 16 MMOL/L (ref 5–15)
AST SERPL-CCNC: 18 U/L (ref 1–32)
BACTERIA UR QL AUTO: ABNORMAL /HPF
BASOPHILS # BLD AUTO: 0.2 10*3/MM3 (ref 0–0.2)
BASOPHILS NFR BLD AUTO: 1.5 % (ref 0–1.5)
BILIRUB SERPL-MCNC: 1.7 MG/DL (ref 0–1.2)
BILIRUB UR QL STRIP: NEGATIVE
BUN SERPL-MCNC: 9 MG/DL (ref 8–23)
BUN/CREAT SERPL: 11.8 (ref 7–25)
CALCIUM SPEC-SCNC: 9.6 MG/DL (ref 8.6–10.5)
CHLORIDE SERPL-SCNC: 99 MMOL/L (ref 98–107)
CLARITY UR: CLEAR
CO2 SERPL-SCNC: 22 MMOL/L (ref 22–29)
COLOR UR: YELLOW
CREAT SERPL-MCNC: 0.76 MG/DL (ref 0.57–1)
DEPRECATED RDW RBC AUTO: 50.3 FL (ref 37–54)
EGFRCR SERPLBLD CKD-EPI 2021: 79.3 ML/MIN/1.73
EOSINOPHIL # BLD AUTO: 0 10*3/MM3 (ref 0–0.4)
EOSINOPHIL NFR BLD AUTO: 0.1 % (ref 0.3–6.2)
ERYTHROCYTE [DISTWIDTH] IN BLOOD BY AUTOMATED COUNT: 18.5 % (ref 12.3–15.4)
GLOBULIN UR ELPH-MCNC: 3.5 GM/DL
GLUCOSE SERPL-MCNC: 136 MG/DL (ref 65–99)
GLUCOSE UR STRIP-MCNC: NEGATIVE MG/DL
HCT VFR BLD AUTO: 34.1 % (ref 34–46.6)
HGB BLD-MCNC: 11.1 G/DL (ref 12–15.9)
HGB UR QL STRIP.AUTO: NEGATIVE
HYALINE CASTS UR QL AUTO: ABNORMAL /LPF
KETONES UR QL STRIP: ABNORMAL
LEUKOCYTE ESTERASE UR QL STRIP.AUTO: NEGATIVE
LIPASE SERPL-CCNC: 9 U/L (ref 13–60)
LYMPHOCYTES # BLD AUTO: 1 10*3/MM3 (ref 0.7–3.1)
LYMPHOCYTES NFR BLD AUTO: 8.4 % (ref 19.6–45.3)
MAGNESIUM SERPL-MCNC: 1.8 MG/DL (ref 1.6–2.4)
MCH RBC QN AUTO: 25.3 PG (ref 26.6–33)
MCHC RBC AUTO-ENTMCNC: 32.5 G/DL (ref 31.5–35.7)
MCV RBC AUTO: 77.8 FL (ref 79–97)
MONOCYTES # BLD AUTO: 0.6 10*3/MM3 (ref 0.1–0.9)
MONOCYTES NFR BLD AUTO: 4.9 % (ref 5–12)
NEUTROPHILS NFR BLD AUTO: 85.1 % (ref 42.7–76)
NEUTROPHILS NFR BLD AUTO: 9.9 10*3/MM3 (ref 1.7–7)
NITRITE UR QL STRIP: NEGATIVE
NRBC BLD AUTO-RTO: 0 /100 WBC (ref 0–0.2)
NT-PROBNP SERPL-MCNC: 8657 PG/ML (ref 0–1800)
PH UR STRIP.AUTO: 8 [PH] (ref 5–8)
PLATELET # BLD AUTO: 249 10*3/MM3 (ref 140–450)
PMV BLD AUTO: 8.4 FL (ref 6–12)
POTASSIUM SERPL-SCNC: 4.1 MMOL/L (ref 3.5–5.2)
PROT SERPL-MCNC: 7.8 G/DL (ref 6–8.5)
PROT UR QL STRIP: ABNORMAL
RBC # BLD AUTO: 4.38 10*6/MM3 (ref 3.77–5.28)
RBC # UR STRIP: ABNORMAL /HPF
REF LAB TEST METHOD: ABNORMAL
SARS-COV-2 RNA PNL SPEC NAA+PROBE: NOT DETECTED
SODIUM SERPL-SCNC: 137 MMOL/L (ref 136–145)
SP GR UR STRIP: 1.01 (ref 1–1.03)
SQUAMOUS #/AREA URNS HPF: ABNORMAL /HPF
TROPONIN T SERPL-MCNC: <0.01 NG/ML (ref 0–0.03)
TROPONIN T SERPL-MCNC: <0.01 NG/ML (ref 0–0.03)
TSH SERPL DL<=0.05 MIU/L-ACNC: 3.53 UIU/ML (ref 0.27–4.2)
UROBILINOGEN UR QL STRIP: ABNORMAL
WBC # UR STRIP: ABNORMAL /HPF
WBC NRBC COR # BLD: 11.7 10*3/MM3 (ref 3.4–10.8)

## 2022-03-04 PROCEDURE — 25010000002 ENOXAPARIN PER 10 MG: Performed by: PHYSICIAN ASSISTANT

## 2022-03-04 PROCEDURE — 84443 ASSAY THYROID STIM HORMONE: CPT | Performed by: PHYSICIAN ASSISTANT

## 2022-03-04 PROCEDURE — 87635 SARS-COV-2 COVID-19 AMP PRB: CPT | Performed by: PHYSICIAN ASSISTANT

## 2022-03-04 PROCEDURE — 84466 ASSAY OF TRANSFERRIN: CPT | Performed by: PHYSICIAN ASSISTANT

## 2022-03-04 PROCEDURE — 99285 EMERGENCY DEPT VISIT HI MDM: CPT

## 2022-03-04 PROCEDURE — 85025 COMPLETE CBC W/AUTO DIFF WBC: CPT | Performed by: PHYSICIAN ASSISTANT

## 2022-03-04 PROCEDURE — 25010000002 ONDANSETRON PER 1 MG: Performed by: PHYSICIAN ASSISTANT

## 2022-03-04 PROCEDURE — 25010000002 FUROSEMIDE PER 20 MG: Performed by: PHYSICIAN ASSISTANT

## 2022-03-04 PROCEDURE — 71275 CT ANGIOGRAPHY CHEST: CPT

## 2022-03-04 PROCEDURE — 83880 ASSAY OF NATRIURETIC PEPTIDE: CPT | Performed by: PHYSICIAN ASSISTANT

## 2022-03-04 PROCEDURE — 0 IOPAMIDOL PER 1 ML: Performed by: EMERGENCY MEDICINE

## 2022-03-04 PROCEDURE — 83735 ASSAY OF MAGNESIUM: CPT | Performed by: PHYSICIAN ASSISTANT

## 2022-03-04 PROCEDURE — G0378 HOSPITAL OBSERVATION PER HR: HCPCS

## 2022-03-04 PROCEDURE — 25010000002 AMIODARONE IN DEXTROSE 5% 150-4.21 MG/100ML-% SOLUTION: Performed by: NURSE PRACTITIONER

## 2022-03-04 PROCEDURE — 93005 ELECTROCARDIOGRAM TRACING: CPT | Performed by: PHYSICIAN ASSISTANT

## 2022-03-04 PROCEDURE — 0 MORPHINE SULFATE 4 MG/ML SOLUTION: Performed by: PHYSICIAN ASSISTANT

## 2022-03-04 PROCEDURE — 83690 ASSAY OF LIPASE: CPT | Performed by: PHYSICIAN ASSISTANT

## 2022-03-04 PROCEDURE — 25010000002 AMIODARONE IN DEXTROSE 5% 360-4.14 MG/200ML-% SOLUTION: Performed by: NURSE PRACTITIONER

## 2022-03-04 PROCEDURE — 84484 ASSAY OF TROPONIN QUANT: CPT | Performed by: PHYSICIAN ASSISTANT

## 2022-03-04 PROCEDURE — 74176 CT ABD & PELVIS W/O CONTRAST: CPT

## 2022-03-04 PROCEDURE — 83540 ASSAY OF IRON: CPT | Performed by: PHYSICIAN ASSISTANT

## 2022-03-04 PROCEDURE — 80053 COMPREHEN METABOLIC PANEL: CPT | Performed by: PHYSICIAN ASSISTANT

## 2022-03-04 PROCEDURE — 99223 1ST HOSP IP/OBS HIGH 75: CPT | Performed by: HOSPITALIST

## 2022-03-04 PROCEDURE — 81001 URINALYSIS AUTO W/SCOPE: CPT | Performed by: PHYSICIAN ASSISTANT

## 2022-03-04 RX ORDER — ONDANSETRON 2 MG/ML
4 INJECTION INTRAMUSCULAR; INTRAVENOUS ONCE
Status: COMPLETED | OUTPATIENT
Start: 2022-03-04 | End: 2022-03-04

## 2022-03-04 RX ORDER — ONDANSETRON 4 MG/1
4 TABLET, FILM COATED ORAL EVERY 6 HOURS PRN
Status: DISCONTINUED | OUTPATIENT
Start: 2022-03-04 | End: 2022-03-10 | Stop reason: HOSPADM

## 2022-03-04 RX ORDER — SODIUM CHLORIDE 0.9 % (FLUSH) 0.9 %
10 SYRINGE (ML) INJECTION EVERY 12 HOURS SCHEDULED
Status: DISCONTINUED | OUTPATIENT
Start: 2022-03-04 | End: 2022-03-10 | Stop reason: HOSPADM

## 2022-03-04 RX ORDER — FUROSEMIDE 10 MG/ML
40 INJECTION INTRAMUSCULAR; INTRAVENOUS ONCE
Status: COMPLETED | OUTPATIENT
Start: 2022-03-04 | End: 2022-03-04

## 2022-03-04 RX ORDER — MORPHINE SULFATE 4 MG/ML
2 INJECTION, SOLUTION INTRAMUSCULAR; INTRAVENOUS ONCE
Status: DISCONTINUED | OUTPATIENT
Start: 2022-03-04 | End: 2022-03-04

## 2022-03-04 RX ORDER — MORPHINE SULFATE 4 MG/ML
4 INJECTION, SOLUTION INTRAMUSCULAR; INTRAVENOUS ONCE
Status: COMPLETED | OUTPATIENT
Start: 2022-03-04 | End: 2022-03-04

## 2022-03-04 RX ORDER — SODIUM CHLORIDE 0.9 % (FLUSH) 0.9 %
10 SYRINGE (ML) INJECTION AS NEEDED
Status: DISCONTINUED | OUTPATIENT
Start: 2022-03-04 | End: 2022-03-10 | Stop reason: HOSPADM

## 2022-03-04 RX ORDER — FUROSEMIDE 10 MG/ML
40 INJECTION INTRAMUSCULAR; INTRAVENOUS DAILY
Status: DISCONTINUED | OUTPATIENT
Start: 2022-03-05 | End: 2022-03-10 | Stop reason: HOSPADM

## 2022-03-04 RX ORDER — SODIUM CHLORIDE 0.9 % (FLUSH) 0.9 %
10 SYRINGE (ML) INJECTION EVERY 12 HOURS SCHEDULED
Status: DISCONTINUED | OUTPATIENT
Start: 2022-03-04 | End: 2022-03-10 | Stop reason: SDUPTHER

## 2022-03-04 RX ORDER — HYDROCODONE BITARTRATE AND ACETAMINOPHEN 7.5; 325 MG/1; MG/1
1 TABLET ORAL EVERY 6 HOURS PRN
Status: DISCONTINUED | OUTPATIENT
Start: 2022-03-04 | End: 2022-03-10 | Stop reason: HOSPADM

## 2022-03-04 RX ORDER — CHOLECALCIFEROL (VITAMIN D3) 125 MCG
5 CAPSULE ORAL NIGHTLY PRN
Status: DISCONTINUED | OUTPATIENT
Start: 2022-03-04 | End: 2022-03-10 | Stop reason: HOSPADM

## 2022-03-04 RX ORDER — DILTIAZEM HYDROCHLORIDE 5 MG/ML
10 INJECTION INTRAVENOUS ONCE
Status: COMPLETED | OUTPATIENT
Start: 2022-03-04 | End: 2022-03-04

## 2022-03-04 RX ORDER — ONDANSETRON 2 MG/ML
4 INJECTION INTRAMUSCULAR; INTRAVENOUS EVERY 6 HOURS PRN
Status: DISCONTINUED | OUTPATIENT
Start: 2022-03-04 | End: 2022-03-10 | Stop reason: HOSPADM

## 2022-03-04 RX ORDER — NITROGLYCERIN 0.4 MG/1
0.4 TABLET SUBLINGUAL
Status: DISCONTINUED | OUTPATIENT
Start: 2022-03-04 | End: 2022-03-05

## 2022-03-04 RX ADMIN — Medication 10 ML: at 22:07

## 2022-03-04 RX ADMIN — SODIUM CHLORIDE 15 MG/HR: 900 INJECTION, SOLUTION INTRAVENOUS at 22:05

## 2022-03-04 RX ADMIN — Medication 10 ML: at 22:14

## 2022-03-04 RX ADMIN — DILTIAZEM HYDROCHLORIDE 10 MG: 5 INJECTION INTRAVENOUS at 15:58

## 2022-03-04 RX ADMIN — FUROSEMIDE 40 MG: 10 INJECTION, SOLUTION INTRAVENOUS at 16:49

## 2022-03-04 RX ADMIN — DILTIAZEM HYDROCHLORIDE 10 MG: 5 INJECTION INTRAVENOUS at 13:56

## 2022-03-04 RX ADMIN — AMIODARONE HYDROCHLORIDE 150 MG: 1.5 INJECTION, SOLUTION INTRAVENOUS at 22:46

## 2022-03-04 RX ADMIN — MORPHINE SULFATE 4 MG: 4 INJECTION INTRAVENOUS at 15:19

## 2022-03-04 RX ADMIN — ONDANSETRON 4 MG: 2 INJECTION INTRAMUSCULAR; INTRAVENOUS at 13:55

## 2022-03-04 RX ADMIN — AMIODARONE HYDROCHLORIDE 1 MG/MIN: 1.8 INJECTION, SOLUTION INTRAVENOUS at 23:05

## 2022-03-04 RX ADMIN — SODIUM CHLORIDE 5 MG/HR: 900 INJECTION, SOLUTION INTRAVENOUS at 14:04

## 2022-03-04 RX ADMIN — IOPAMIDOL 100 ML: 755 INJECTION, SOLUTION INTRAVENOUS at 15:57

## 2022-03-04 RX ADMIN — ENOXAPARIN SODIUM 70 MG: 100 INJECTION SUBCUTANEOUS at 16:48

## 2022-03-04 RX ADMIN — Medication 5 MG: at 22:14

## 2022-03-04 RX ADMIN — HYDROCODONE BITARTRATE AND ACETAMINOPHEN 1 TABLET: 7.5; 325 TABLET ORAL at 22:10

## 2022-03-04 NOTE — ED PROVIDER NOTES
Subjective       Patient is an 81-year-old female who complains of bilateral lower back pain and dysuria since last night.  Patient states that she has had some generalized weakness and fatigue since last night.  Patient states she has chronic back pain but this pain felt somewhat different.  Patient states her pain is in her bilateral flanks, left worse than right.  Patient states that she has had some urinary urgency last few days as well.  Patient reports some subjective fever and chills last night.  Patient otherwise denies nausea, vomiting, diarrhea, shortness of breath, cough or sick contacts.  Patient has a history of paroxysmal atrial fibrillation and is on Eliquis for this. Patient states she has not taken her Eliquis in the past week due to this being more expensive than usual at her pharmacy. Upon reevaluation, patient reports she has had chest pain as well last few days.        Review of Systems   Constitutional: Positive for chills and fatigue. Negative for fever.   HENT: Negative for congestion, sore throat, tinnitus and trouble swallowing.    Eyes: Negative for photophobia, discharge and visual disturbance.   Respiratory: Negative for cough and shortness of breath.    Cardiovascular: Positive for chest pain. Negative for palpitations and leg swelling.   Gastrointestinal: Negative for abdominal pain, blood in stool, diarrhea, nausea and vomiting.   Genitourinary: Positive for dysuria, flank pain, frequency and urgency.   Musculoskeletal: Negative for arthralgias and myalgias.   Skin: Negative for rash.   Neurological: Negative for dizziness and headaches.   Psychiatric/Behavioral: Negative for confusion.       Past Medical History:   Diagnosis Date   • Anxiety    • Arthritis    • Atrial fibrillation (HCC)     paroxysmal   • Broken shoulder     left-- due to fall 11-7-19 was at [facility]   • Buttock pain     rt side   • DDD (degenerative disc disease), lumbar    • Depression    • Edema     9/2020 foot   •  GERD (gastroesophageal reflux disease)    • H/O fall    • Hip pain     rt   • Hypertension    • Hypothyroidism    • Insomnia    • Leg pain     rt   • Low back pain    • Neuropathy    • PONV (postoperative nausea and vomiting)    • Pulmonary hypertension (HCC)    • Radiculopathy    • Restless legs    • Spondylolisthesis    • Urinary incontinence        Allergies   Allergen Reactions   • Baclofen Rash   • Codeine Nausea Only   • Ibuprofen Unknown (See Comments)     Patient doesn't know----Motin   • Methocarbamol Unknown (See Comments)     Patient doesn't know   • Naproxen Unknown (See Comments)     Pt. Doesn't know    • Tizanidine Hcl Hallucinations   • Tolmetin Dizziness     Pt. Doesn't know-- same as Tolectin       Past Surgical History:   Procedure Laterality Date   • BACK SURGERY  07/19/2018    PDC &  PSF L3-L5 insitu   • CARDIAC CATHETERIZATION N/A 4/2/2021    Procedure: Cardiac Catheterization/Vascular Study;  Surgeon: Barrett Evans MD;  Location: [facility] INVASIVE LOC[facility];  Service: Cardiovascular;  Laterality: N/A;   • CHOLECYSTECTOMY     • COLONOSCOPY     • HYSTERECTOMY     • ROTATOR CUFF REPAIR Left        Family History   Problem Relation Age of Onset   • Cancer Father    • Diabetes Son    • Cancer Paternal Aunt    • Heart disease Paternal Aunt    • Cancer Paternal Uncle        Social History     Socioeconomic History   • Marital status:    Tobacco Use   • Smoking status: Never Smoker   • Smokeless tobacco: Never Used   Vaping Use   • Vaping Use: Never used   Substance and Sexual Activity   • Alcohol use: No   • Drug use: No   • Sexual activity: Defer           Objective   Physical Exam  Vitals and nursing note reviewed.   Constitutional:       General: She is not in acute distress.     Appearance: She is well-developed. She is not diaphoretic.   HENT:      Head: Normocephalic and atraumatic.      Right Ear: External ear normal.      Left Ear: External ear normal.      Nose: Nose  "normal.      Mouth/Throat:      Pharynx: No oropharyngeal exudate.   Eyes:      Extraocular Movements: Extraocular movements intact.      Conjunctiva/sclera: Conjunctivae normal.      Pupils: Pupils are equal, round, and reactive to light.   Cardiovascular:      Rate and Rhythm: Normal rate and regular rhythm.      Heart sounds: Normal heart sounds.      Comments: S1, S2 audible.  Pulmonary:      Effort: Pulmonary effort is normal. No respiratory distress.      Breath sounds: Normal breath sounds. No wheezing, rhonchi or rales.      Comments: On room air.  Abdominal:      General: Bowel sounds are normal. There is no distension.      Palpations: Abdomen is soft.      Tenderness: There is no abdominal tenderness. There is no guarding or rebound.   Musculoskeletal:         General: No tenderness or deformity. Normal range of motion.      Cervical back: Normal range of motion.   Skin:     General: Skin is warm.      Capillary Refill: Capillary refill takes less than 2 seconds.      Findings: No erythema or rash.   Neurological:      General: No focal deficit present.      Mental Status: She is alert and oriented to person, place, and time.      Cranial Nerves: No cranial nerve deficit.      Sensory: No sensory deficit.      Motor: No weakness.   Psychiatric:         Mood and Affect: Mood normal.         Behavior: Behavior normal.         Procedures           ED Course      /57   Pulse 117   Temp 99.4 °F (37.4 °C) (Oral)   Resp 14   Ht 165.1 cm (65\")   Wt 71.7 kg (158 lb)   SpO2 97%   Breastfeeding No   BMI 26.29 kg/m²   Labs Reviewed   COMPREHENSIVE METABOLIC PANEL - Abnormal; Notable for the following components:       Result Value    Glucose 136 (*)     Total Bilirubin 1.7 (*)     Anion Gap 16.0 (*)     All other components within normal limits    Narrative:     GFR Normal >60  Chronic Kidney Disease <60  Kidney Failure <15     LIPASE - Abnormal; Notable for the following components:    Lipase 9 (*)     " All other components within normal limits   URINALYSIS W/ CULTURE IF INDICATED - Abnormal; Notable for the following components:    Ketones, UA 40 mg/dL (2+) (*)     Protein, UA 30 mg/dL (1+) (*)     All other components within normal limits   CBC WITH AUTO DIFFERENTIAL - Abnormal; Notable for the following components:    WBC 11.70 (*)     Hemoglobin 11.1 (*)     MCV 77.8 (*)     MCH 25.3 (*)     RDW 18.5 (*)     Neutrophil % 85.1 (*)     Lymphocyte % 8.4 (*)     Monocyte % 4.9 (*)     Eosinophil % 0.1 (*)     Neutrophils, Absolute 9.90 (*)     All other components within normal limits   URINALYSIS, MICROSCOPIC ONLY - Abnormal; Notable for the following components:    RBC, UA 0-2 (*)     WBC, UA 0-2 (*)     All other components within normal limits   BNP (IN-HOUSE) - Abnormal; Notable for the following components:    proBNP 8,657.0 (*)     All other components within normal limits    Narrative:     Among patients with dyspnea, NT-proBNP is highly sensitive for the detection of acute congestive heart failure. In addition NT-proBNP of <300 pg/ml effectively rules out acute congestive heart failure with 99% negative predictive value.    Results may be falsely decreased if patient taking Biotin.     COVID-19,CEPHEID/CARLI,COR/KEVEN/PAD/ISAÍAS IN-HOUSE,NP SWAB IN TRANSPORT MEDIA 3-4 HR TAT, RT-PCR - Normal    Narrative:     Fact sheet for providers: https://www.fda.gov/media/493476/download     Fact sheet for patients: https://www.fda.gov/media/268949/download  Fact sheet for providers: https://www.fda.gov/media/321303/download    Fact sheet for patients: https://www.fda.gov/media/101666/download    Test performed by PCR.   TROPONIN (IN-HOUSE) - Normal    Narrative:     Troponin T Reference Range:  <= 0.03 ng/mL-   Negative for AMI  >0.03 ng/mL-     Abnormal for myocardial necrosis.  Clinicians would have to utilize clinical acumen, EKG, Troponin and serial changes to determine if it is an Acute Myocardial Infarction or  myocardial injury due to an underlying chronic condition.       Results may be falsely decreased if patient taking Biotin.     TSH - Normal   MAGNESIUM - Normal   CBC AND DIFFERENTIAL    Narrative:     The following orders were created for panel order CBC & Differential.  Procedure                               Abnormality         Status                     ---------                               -----------         ------                     CBC Auto Differential[[id number]]        Abnormal            Final result                 Please view results for these tests on the individual orders.     CT Abdomen Pelvis Without Contrast    Result Date: 3/4/2022   1. No acute findings within the abdomen or pelvis. 2. Trace pelvic free fluid, nonspecific. 3. FINDINGS suggestive of mild interstitial edema lung bases. Trace left pleural effusion is present.    Electronically Signed By-Suzanne King MD On:3/4/2022 2:56 PM This report was finalized on [id number] by  Suzanne King MD.    CT Chest Pulmonary Embolism    Result Date: 3/4/2022  1. No pulmonary embolism. 2. Features suggestive of interstitial and alveolar pulmonary edema with small layering bilateral pleural effusions. 3. Mild cardiomegaly. Moderate left anterior descending coronary artery calcification.     Electronically Signed By-Suzanne King MD On:3/4/2022 4:13 PM This report was finalized on [id number] by  Suzanne King MD.                                               MDM  Number of Diagnoses or Management Options     Amount and/or Complexity of Data Reviewed  Clinical lab tests: reviewed         Chart review: History of diastolic CHF, history of A. fib is on Eliquis but has not taken of the last week due to this medicine being too expensive at the pharmacy.  EKG:  Not indicated    Imaging:    CT Chest Pulmonary Embolism   Final Result   1. No pulmonary embolism.   2. Features suggestive of interstitial and alveolar pulmonary edema with   small  "layering bilateral pleural effusions.   3. Mild cardiomegaly. Moderate left anterior descending coronary artery   calcification.                   Electronically Signed By-Suzanne King MD On:3/4/2022 4:13 PM   This report was finalized on [id number] by  Suzanne King MD.      CT Abdomen Pelvis Without Contrast   Final Result       1. No acute findings within the abdomen or pelvis.   2. Trace pelvic free fluid, nonspecific.   3. FINDINGS suggestive of mild interstitial edema lung bases. Trace left   pleural effusion is present.               Electronically Signed By-Suzanne King MD On:3/4/2022 2:56 PM   This report was finalized on [id number] by  Suzanne iKng MD.          Labs: COVID-19 swab negative, TSH normal, magnesium normal, initial troponin negative, lipase normal, proBNP elevated at 8600 which is significantly elevated from prior. CBC and CMP largely unremarkable for acute findings. UA unremarkable for acute findings.    Vitals:  /57   Pulse 117   Temp 99.4 °F (37.4 °C) (Oral)   Resp 14   Ht 165.1 cm (65\")   Wt 71.7 kg (158 lb)   SpO2 97%   Breastfeeding No   BMI 26.29 kg/m²       Medications given:    Medications   sodium chloride 0.9 % flush 10 mL (has no administration in time range)   dilTIAZem (CARDIZEM) 100 mg in 100 mL NS infusion (ADV) (12.5 mg/hr Intravenous Rate/Dose Change 3/4/22 1531)   sodium chloride 0.9 % flush 10 mL (has no administration in time range)   sodium chloride 0.9 % flush 10 mL (has no administration in time range)   dilTIAZem (CARDIZEM) injection 10 mg (10 mg Intravenous Given 3/4/22 1356)   ondansetron (ZOFRAN) injection 4 mg (4 mg Intravenous Given 3/4/22 1355)   Morphine sulfate (PF) injection 4 mg (4 mg Intravenous Given 3/4/22 1519)   dilTIAZem (CARDIZEM) injection 10 mg (10 mg Intravenous Given 3/4/22 1558)   iopamidol (ISOVUE-370) 76 % injection 100 mL (100 mL Intravenous Given 3/4/22 1557)   furosemide (LASIX) injection 40 mg (40 mg Intravenous " Given 3/4/22 1649)   enoxaparin (LOVENOX) syringe 70 mg (70 mg Subcutaneous Given 3/4/22 1648)       Procedures:    MDM: Patient is an 81-year-old female comes in complaining of left flank pain and chest pain. Patient has history of A. fib and on Eliquis but has been off of this for the past week secondary to this being too expensive. Patient has a history of CHF but is not on a water pill for this at home. COVID-19 swab negative, TSH normal, magnesium normal, initial troponin negative, lipase normal, proBNP elevated at 8600 which is significantly elevated from prior. CBC and CMP largely unremarkable for acute findings. UA unremarkable for acute findings. Given patient's chest pain and being off of her Eliquis for the past 7 days, CT PE protocol was done that showed no pulmonary embolism but does show pulmonary edema. Patient given therapeutic dose Lovenox in ED. Patient is not on a diuretic at home and thus 40 mg IV Lasix was ordered. Patient was found to be in A. fib and RVR on EKG but otherwise showed no acute findings. Patient's heart rate was as high as 150s and patient is on Cardizem drip. Patient was not hypotensive during ER stay. Heart rate improved to the 110s upon admission. Patient given Zofran for nausea. Patient given morphine for chest pain and back pain control with some relief. Patient. No acute distress upon reevaluation. Patient was put on 2 L nasal cannula for comfort but was not hypoxic during ER stay. Spoke with MAGAN Penaloza, who accepted patient on behalf of hospitalist team and , for admission. Results and plan discussed with patient and is agreeable with plan.    Final diagnoses:   Atrial fibrillation with RVR (HCC)   Chest pain, unspecified type   Acute on chronic diastolic congestive heart failure (HCC)   Chronic left-sided low back pain without sciatica       ED Disposition  ED Disposition     ED Disposition Condition Comment    Decision to Admit  Level of Care: Med/Surg [1]    Diagnosis: Atrial fibrillation with RVR ([facility]) [[id number]]   Admitting Physician: ALTHEA CUEVAS [[id number]]   Attending Physician: ALTHEA CUEVAS [[id number]]   Certification: I Certify That Inpatient Hospital Services Are Medically Necessary For Greater Than 2 Midnights            No follow-up provider specified.       Medication List      No changes were made to your prescriptions during this visit.          Alex Munoz PA  03/04/22 1707

## 2022-03-04 NOTE — H&P
[facility]      Patient Name: Catalina Moreno  : 1940  MRN: [id number]  Primary Care Physician:  Anayeli Costa APRN  Date of admission: 3/4/2022      Subjective      Chief Complaint: Back and chest pain    History of Present Illness: Catalina Moreno is a 81 y.o. female who presented to [facility] on 3/4/2022 complaining of Back pain, chest pain and dyspnea.  Patient reports she has chronic low back pain which for the past 2-3 nights has been significantly worse than her baseline which was her primary reason for presenting to the hospital.  Following admission patient was discovered to be tachycardic with an irregular heart rhythm and found to be in A. fib with RVR.  She does complain of some mild chest pain substernally as well as increased dyspnea from baseline as well as peripheral edema.  She reports a subjective fever 2 nights prior as well as some occasional lightheadedness and nausea without vomiting but denies any palpitations, syncope, changes in bowel or bladder habits or sick contacts.  Additionally patient notes that she stopped taking her Eliquis approximately 1 week prior due to cost but confirms that she remains compliant with all of her other medications.    In the ED labs were notable for troponin of less than 0.010, proBNP: 8657.0, anion gap: 16.0 with a serum CO2 of 22.0, hemoglobin: 11.1 with a decreased MCV, remainder of CBC and CMP was generally unremarkable.  TSH: 3.530, lipase: 9, magnesium: 1.8.  UA showed 0-2 WBCs, 0-2 RBCs, 1+ protein and 2+ ketones but was otherwise unremarkable.  CT of abdomen and pelvis showed no acute findings with trace pelvic free fluid and findings suggestive of mild interstitial edema in the lung bases with trace left pleural effusion also noted.  CT PE protocol shows no pulmonary embolism with features suggestive of interstitial and alveolar pulmonary edema with small layering bilateral pleural  effusions.  Mild cardiomegaly along with moderate left anterior descending coronary artery calcification is also noted.  EKG shows A. fib with RVR at a rate of 158 without obvious acute ST changes and a QTC of 495 ms    Review of Systems   Constitutional: Positive for fever. Negative for chills and diaphoresis.   HENT: Negative.    Eyes: Negative.    Cardiovascular: Positive for chest pain, dyspnea on exertion, irregular heartbeat and leg swelling. Negative for palpitations and syncope.   Respiratory: Positive for shortness of breath. Negative for cough.    Endocrine: Negative.    Skin: Negative.    Musculoskeletal: Negative.    Gastrointestinal: Positive for nausea. Negative for abdominal pain, constipation, diarrhea, melena and vomiting.   Genitourinary: Negative.    Neurological: Positive for light-headedness.   Psychiatric/Behavioral: Negative.         Personal History     Past Medical History:   Diagnosis Date   • Anxiety    • Arthritis    • Atrial fibrillation (HCC)     paroxysmal   • Broken shoulder     left-- due to fall 11-7-19 was at [facility]   • Buttock pain     rt side   • DDD (degenerative disc disease), lumbar    • Depression    • Edema     9/2020 foot   • GERD (gastroesophageal reflux disease)    • H/O fall    • Hip pain     rt   • Hypertension    • Hypothyroidism    • Insomnia    • Leg pain     rt   • Low back pain    • Neuropathy    • PONV (postoperative nausea and vomiting)    • Pulmonary hypertension (HCC)    • Radiculopathy    • Restless legs    • Spondylolisthesis    • Urinary incontinence        Past Surgical History:   Procedure Laterality Date   • BACK SURGERY  07/19/2018    PDC &  PSF L3-L5 insitu   • CARDIAC CATHETERIZATION N/A 4/2/2021    Procedure: Cardiac Catheterization/Vascular Study;  Surgeon: Barrett Evans MD;  Location: [facility] INVASIVE LOCATION;  Service: Cardiovascular;  Laterality: N/A;   • CHOLECYSTECTOMY     • COLONOSCOPY     • HYSTERECTOMY     • ROTATOR CUFF  REPAIR Left        Family History: family history includes Cancer in her father, paternal aunt, and paternal uncle; Diabetes in her son; Heart disease in her paternal aunt. Otherwise pertinent FHx was reviewed and not pertinent to current issue.    Social History:  reports that she has never smoked. She has never used smokeless tobacco. She reports that she does not drink alcohol and does not use drugs.    Home Medications:  Prior to Admission Medications     Prescriptions Last Dose Informant Patient Reported? Taking?    apixaban (ELIQUIS) 5 MG tablet tablet   No No    Take 1 tablet by mouth Every 12 (Twelve) Hours.    atorvastatin (LIPITOR) 10 MG tablet   No No    Take 1 tablet by mouth Every Night.    cyclobenzaprine (FLEXERIL) 5 MG tablet   No No    Take 1 tablet by mouth 2 (Two) Times a Day As Needed for Muscle Spasms.    digoxin (LANOXIN) 125 MCG tablet  Provider's Office No No    Take 1 tablet by mouth Daily.    ergocalciferol (ERGOCALCIFEROL) 1.25 MG (58287 UT) capsule   Yes No    Vitamin D2 1,250 mcg (50,000 unit) capsule   Take 1 capsule every week by oral route for 84 days.    famotidine (PEPCID) 20 MG tablet   Yes No    Take 20 mg by mouth Daily.    gabapentin (NEURONTIN) 600 MG tablet   Yes No    Take 600 mg by mouth 4 (Four) Times a Day.    HYDROcodone-acetaminophen (NORCO) 7.5-325 MG per tablet   No No    Take 1 tablet by mouth 4 (Four) Times a Day As Needed for Severe Pain .    HYDROcodone-acetaminophen (NORCO) 7.5-325 MG per tablet   No No    Take 1 tablet by mouth 4 (Four) Times a Day As Needed for Severe Pain . DNF before 3/8/2022    levothyroxine (SYNTHROID, LEVOTHROID) 88 MCG tablet   Yes No    Take 88 mcg by mouth Daily. Called in by NP as of 12/7/2021 pt hasnt picked up will see if son can  for her.    levothyroxine (SYNTHROID, LEVOTHROID) 88 MCG tablet   Yes No    levothyroxine 88 mcg tablet   Take 1 tablet every day by oral route in the morning.    lidocaine (LIDODERM) 5 %   Yes No     Place 1 patch on the skin as directed by provider Daily As Needed for Mild Pain . Remove & Discard patch within 12 hours or as directed by MD    metoprolol succinate XL (TOPROL-XL) 200 MG 24 hr tablet   Yes No    pantoprazole (PROTONIX) 40 MG EC tablet   Yes No    Take 40 mg by mouth Daily With Lunch. Afternoons    pregabalin (LYRICA) 75 MG capsule   No No    TAKE 1 CAPSULE BY MOUTH TWICE DAILY    rOPINIRole (REQUIP) 0.25 MG tablet   No No    Take 1 tablet by mouth Every Night. Take 1 hour before bedtime    sertraline (ZOLOFT) 50 MG tablet   Yes No    Take 50 mg by mouth Every Night.    sildenafil (REVATIO) 20 MG tablet   Yes No    Take 20 mg by mouth 2 (Two) Times a Day.    vitamin B-12 (VITAMIN B-12) 1000 MCG tablet   No No    Take 1 tablet by mouth Daily.            Allergies:  Allergies   Allergen Reactions   • Baclofen Rash   • Codeine Nausea Only   • Ibuprofen Unknown (See Comments)     Patient doesn't know----Motin   • Methocarbamol Unknown (See Comments)     Patient doesn't know   • Naproxen Unknown (See Comments)     Pt. Doesn't know    • Tizanidine Hcl Hallucinations   • Tolmetin Dizziness     Pt. Doesn't know-- same as Tolectin       Objective      Vitals:   Temp:  [99.4 °F (37.4 °C)] 99.4 °F (37.4 °C)  Heart Rate:  [112-154] 114  Resp:  [11-20] 14  BP: (120-194)/() 120/72    Physical Exam  Vitals reviewed.   Constitutional:       General: She is not in acute distress.     Appearance: Normal appearance. She is normal weight. She is not ill-appearing, toxic-appearing or diaphoretic.   HENT:      Head: Normocephalic and atraumatic.      Right Ear: External ear normal.      Left Ear: External ear normal.      Nose: Nose normal.      Mouth/Throat:      Mouth: Mucous membranes are moist.   Eyes:      Extraocular Movements: Extraocular movements intact.   Cardiovascular:      Rate and Rhythm: Tachycardia present. Rhythm irregular.      Pulses: Normal pulses.      Heart sounds: Normal heart sounds.    Pulmonary:      Effort: Pulmonary effort is normal.      Breath sounds: Rales present.   Abdominal:      General: Bowel sounds are normal. There is no distension.      Palpations: Abdomen is soft.      Tenderness: There is no abdominal tenderness.   Musculoskeletal:         General: Normal range of motion.      Cervical back: Normal range of motion.      Right lower leg: Edema present.      Left lower leg: Edema present.   Skin:     General: Skin is warm and dry.      Capillary Refill: Capillary refill takes less than 2 seconds.   Neurological:      General: No focal deficit present.      Mental Status: She is alert and oriented to person, place, and time.   Psychiatric:         Mood and Affect: Mood normal.         Behavior: Behavior normal.         Thought Content: Thought content normal.         Judgment: Judgment normal.          Result Review    Result Review:  I have personally reviewed the results from the time of this admission to 3/4/2022 16:45 EST and agree with these findings:  [x]  Laboratory  []  Microbiology  [x]  Radiology  [x]  EKG/Telemetry   []  Cardiology/Vascular   []  Pathology  []  Old records  []  Other:  Most notable findings include: Troponin, proBNP, CMP, CBC, lipase, magnesium, UA, CT of abdomen and pelvis, CT of chest and EKG      Assessment/Plan        Active Hospital Problems:  Active Hospital Problems    Diagnosis    • **Atrial fibrillation with RVR (HCC)    • Acute on chronic diastolic congestive heart failure (HCC)    • Restless legs syndrome    • Lumbar radiculopathy    • Primary fibromyalgia syndrome    • Pulmonary hypertension, unspecified (HCC)    • Hypothyroidism, unspecified    • Stage 2 chronic kidney disease      Plan:     A. fib with RVR  -EKG shows A. fib with RVR at a rate of 158 without obvious acute ST changes and a QTC of 495 ms  -Patient given Cardizem bolus followed by infusion in the ED along with 1 mg/kg Lovenox and subsequent improvement in heart rate  -Continue  beta-blocker and digoxin, hold p.o. Cardizem while on infusion  -TSH: 3.530  -Troponin less than 0.010, trend  -Continue telemetry  -Lovenox twice daily for now, consider alternative p.o. anticoagulation or Case management consult given patient's inability to afford Eliquis on an outpatient basis  -May benefit from cardiology consult (patient sees Dr. Gonzalez)    Acute on chronic heart failure with preserved ejection fraction  -proBNP: 8657.0 with pulmonary edema noted on CT of chest as well as CT of abdomen  -Echocardiogram from 7/26/2021 showed an EF of 60% with normal left ventricular systolic function with moderate aortic valve regurgitation  -Repeat echocardiogram  -40 mg Lasix given in ED, continue and monitor kidney function I's and O's closely  -2 g sodium diet  -Daily weight  -Continue beta-blocker    Anemia  -Hemoglobin: 11.1 with a decreased MCV  -Check iron profile  -Monitor while admitted    History of CKD stage II  -Serum creatinine: 0.76 with an EGFR of 79.3  -Monitor while admitted    Hypothyroidism  -Levothyroxine    RLS  -Requip    Chronic pain/fibromyalgia  -Norco and Lyrica    Depression/anxiety  -Zoloft    DVT prophylaxis:  Medical DVT prophylaxis orders are present.    CODE STATUS:       Admission Status:  I believe this patient meets observation status.    I discussed the patient's findings and my recommendations with patient and nursing staff.    This patient has been examined wearing appropriate Personal Protective Equipment and discussed with Nursing staff. 03/04/22      Signature: Electronically signed by Wagner Pan PA-C, 03/04/22, 5:39 PM EST.    Hospitalist/attending note  Patient seen and examined, chart reviewed.  Agree with above.-year-old female coming in with palpitation/chest pain, patient recognized to have A. fib with RVR, started on Cardizem drip, may have to start amiodarone if not responding to cardizem drip.    Vitals reviewed  Chest, bilateral entry, normal vesicular  breathing  Cardiovascular, S1-S2 there is no murmur  Abdomen soft nontender good sounds audible    Impression  Atrial fibrillation with RVR  Acute on chronic diastolic heart failure  Chronic kidney disease stage II  Hypertension    Plan  Agree with above  Telemetry  Cardizem drip, titrate to bring the heart rate below 100, if not responding may need to start IV amiodarone  Cardiology consult  2D echo  Therapeutic dose Lovenox for now    Bryce Hastings MD

## 2022-03-05 ENCOUNTER — APPOINTMENT (OUTPATIENT)
Dept: CARDIOLOGY | Facility: HOSPITAL | Age: 82
End: 2022-03-05

## 2022-03-05 LAB
ANION GAP SERPL CALCULATED.3IONS-SCNC: 17 MMOL/L (ref 5–15)
BASOPHILS # BLD AUTO: 0.1 10*3/MM3 (ref 0–0.2)
BASOPHILS NFR BLD AUTO: 0.7 % (ref 0–1.5)
BH CV ECHO MEAS - ACS: 1.97 CM
BH CV ECHO MEAS - AO MAX PG: 6.3 MMHG
BH CV ECHO MEAS - AO MEAN PG: 3.3 MMHG
BH CV ECHO MEAS - AO ROOT DIAM: 3.6 CM
BH CV ECHO MEAS - AO V2 MAX: 125.2 CM/SEC
BH CV ECHO MEAS - AO V2 VTI: 23.4 CM
BH CV ECHO MEAS - AVA(I,D): 2.22 CM2
BH CV ECHO MEAS - EDV(CUBED): 69.4 ML
BH CV ECHO MEAS - EDV(MOD-SP4): 47.8 ML
BH CV ECHO MEAS - EF(MOD-SP4): 35 %
BH CV ECHO MEAS - ESV(CUBED): 15.1 ML
BH CV ECHO MEAS - ESV(MOD-SP4): 31 ML
BH CV ECHO MEAS - FS: 39.9 %
BH CV ECHO MEAS - IVS/LVPW: 1.01 CM
BH CV ECHO MEAS - IVSD: 0.99 CM
BH CV ECHO MEAS - LA DIMENSION(2D): 4.7 CM
BH CV ECHO MEAS - LV DIASTOLIC VOL/BSA (35-75): 27 CM2
BH CV ECHO MEAS - LV MASS(C)D: 129.3 GRAMS
BH CV ECHO MEAS - LV MAX PG: 3 MMHG
BH CV ECHO MEAS - LV MEAN PG: 1.4 MMHG
BH CV ECHO MEAS - LV SYSTOLIC VOL/BSA (12-30): 17.5 CM2
BH CV ECHO MEAS - LV V1 MAX: 86 CM/SEC
BH CV ECHO MEAS - LV V1 VTI: 16.1 CM
BH CV ECHO MEAS - LVIDD: 4.1 CM
BH CV ECHO MEAS - LVIDS: 2.47 CM
BH CV ECHO MEAS - LVOT AREA: 3.2 CM2
BH CV ECHO MEAS - LVOT DIAM: 2.03 CM
BH CV ECHO MEAS - LVPWD: 0.98 CM
BH CV ECHO MEAS - MR MAX PG: 57.4 MMHG
BH CV ECHO MEAS - MR MAX VEL: 378.9 CM/SEC
BH CV ECHO MEAS - MV A MAX VEL: 0.78 CM/SEC
BH CV ECHO MEAS - MV DEC TIME: 0.17 MSEC
BH CV ECHO MEAS - MV E MAX VEL: 94 CM/SEC
BH CV ECHO MEAS - MV E/A: 120.4
BH CV ECHO MEAS - MV MAX PG: 6.1 MMHG
BH CV ECHO MEAS - MV MEAN PG: 3.3 MMHG
BH CV ECHO MEAS - MV V2 VTI: 17.5 CM
BH CV ECHO MEAS - MVA(VTI): 3 CM2
BH CV ECHO MEAS - PA V2 MAX: 97.4 CM/SEC
BH CV ECHO MEAS - RAP SYSTOLE: 3 MMHG
BH CV ECHO MEAS - RV MAX PG: 1.18 MMHG
BH CV ECHO MEAS - RV V1 MAX: 54.3 CM/SEC
BH CV ECHO MEAS - RV V1 VTI: 10 CM
BH CV ECHO MEAS - RVDD: 3.2 CM
BH CV ECHO MEAS - RVSP: 34.3 MMHG
BH CV ECHO MEAS - SI(MOD-SP4): 9.5 ML/M2
BH CV ECHO MEAS - SV(LVOT): 52.1 ML
BH CV ECHO MEAS - SV(MOD-SP4): 16.7 ML
BH CV ECHO MEAS - TR MAX PG: 31.3 MMHG
BH CV ECHO MEAS - TR MAX VEL: 279.7 CM/SEC
BUN SERPL-MCNC: 10 MG/DL (ref 8–23)
BUN/CREAT SERPL: 11.8 (ref 7–25)
CALCIUM SPEC-SCNC: 9.4 MG/DL (ref 8.6–10.5)
CHLORIDE SERPL-SCNC: 95 MMOL/L (ref 98–107)
CO2 SERPL-SCNC: 25 MMOL/L (ref 22–29)
CREAT SERPL-MCNC: 0.85 MG/DL (ref 0.57–1)
DEPRECATED RDW RBC AUTO: 50.3 FL (ref 37–54)
DIGOXIN SERPL-MCNC: <0.3 NG/ML (ref 0.6–1.2)
EGFRCR SERPLBLD CKD-EPI 2021: 69.4 ML/MIN/1.73
EOSINOPHIL # BLD AUTO: 0 10*3/MM3 (ref 0–0.4)
EOSINOPHIL NFR BLD AUTO: 0.2 % (ref 0.3–6.2)
ERYTHROCYTE [DISTWIDTH] IN BLOOD BY AUTOMATED COUNT: 18.5 % (ref 12.3–15.4)
GLUCOSE SERPL-MCNC: 142 MG/DL (ref 65–99)
HCT VFR BLD AUTO: 33.9 % (ref 34–46.6)
HGB BLD-MCNC: 10.7 G/DL (ref 12–15.9)
IRON 24H UR-MRATE: 33 MCG/DL (ref 37–145)
IRON SATN MFR SERPL: 6 % (ref 20–50)
LV EF 2D ECHO EST: 45 %
LYMPHOCYTES # BLD AUTO: 1.7 10*3/MM3 (ref 0.7–3.1)
LYMPHOCYTES NFR BLD AUTO: 20.2 % (ref 19.6–45.3)
MAGNESIUM SERPL-MCNC: 1.8 MG/DL (ref 1.6–2.4)
MAXIMAL PREDICTED HEART RATE: 140 BPM
MCH RBC QN AUTO: 24.6 PG (ref 26.6–33)
MCHC RBC AUTO-ENTMCNC: 31.6 G/DL (ref 31.5–35.7)
MCV RBC AUTO: 77.9 FL (ref 79–97)
MONOCYTES # BLD AUTO: 0.7 10*3/MM3 (ref 0.1–0.9)
MONOCYTES NFR BLD AUTO: 8.9 % (ref 5–12)
NEUTROPHILS NFR BLD AUTO: 5.9 10*3/MM3 (ref 1.7–7)
NEUTROPHILS NFR BLD AUTO: 70 % (ref 42.7–76)
NRBC BLD AUTO-RTO: 0.1 /100 WBC (ref 0–0.2)
PLATELET # BLD AUTO: 250 10*3/MM3 (ref 140–450)
PMV BLD AUTO: 8.5 FL (ref 6–12)
POTASSIUM SERPL-SCNC: 3.2 MMOL/L (ref 3.5–5.2)
RBC # BLD AUTO: 4.36 10*6/MM3 (ref 3.77–5.28)
SODIUM SERPL-SCNC: 137 MMOL/L (ref 136–145)
STRESS TARGET HR: 119 BPM
TIBC SERPL-MCNC: 542 MCG/DL (ref 298–536)
TRANSFERRIN SERPL-MCNC: 364 MG/DL (ref 200–360)
WBC NRBC COR # BLD: 8.4 10*3/MM3 (ref 3.4–10.8)

## 2022-03-05 PROCEDURE — 80162 ASSAY OF DIGOXIN TOTAL: CPT | Performed by: NURSE PRACTITIONER

## 2022-03-05 PROCEDURE — 93306 TTE W/DOPPLER COMPLETE: CPT | Performed by: INTERNAL MEDICINE

## 2022-03-05 PROCEDURE — 25010000002 MAGNESIUM SULFATE 2 GM/50ML SOLUTION: Performed by: NURSE PRACTITIONER

## 2022-03-05 PROCEDURE — 25010000002 FUROSEMIDE PER 20 MG: Performed by: PHYSICIAN ASSISTANT

## 2022-03-05 PROCEDURE — 25010000002 ENOXAPARIN PER 10 MG: Performed by: PHYSICIAN ASSISTANT

## 2022-03-05 PROCEDURE — 25010000002 AMIODARONE IN DEXTROSE 5% 150-4.21 MG/100ML-% SOLUTION: Performed by: NURSE PRACTITIONER

## 2022-03-05 PROCEDURE — 25010000002 AMIODARONE IN DEXTROSE 5% 360-4.14 MG/200ML-% SOLUTION: Performed by: NURSE PRACTITIONER

## 2022-03-05 PROCEDURE — 93306 TTE W/DOPPLER COMPLETE: CPT

## 2022-03-05 PROCEDURE — 99232 SBSQ HOSP IP/OBS MODERATE 35: CPT | Performed by: HOSPITALIST

## 2022-03-05 PROCEDURE — G0378 HOSPITAL OBSERVATION PER HR: HCPCS

## 2022-03-05 RX ORDER — HYDROCODONE BITARTRATE AND ACETAMINOPHEN 7.5; 325 MG/1; MG/1
1 TABLET ORAL 4 TIMES DAILY PRN
Status: DISCONTINUED | OUTPATIENT
Start: 2022-03-08 | End: 2022-03-10 | Stop reason: HOSPADM

## 2022-03-05 RX ORDER — FAMOTIDINE 20 MG/1
20 TABLET, FILM COATED ORAL DAILY
Status: DISCONTINUED | OUTPATIENT
Start: 2022-03-05 | End: 2022-03-10 | Stop reason: HOSPADM

## 2022-03-05 RX ORDER — PANTOPRAZOLE SODIUM 40 MG/1
40 TABLET, DELAYED RELEASE ORAL
Status: DISCONTINUED | OUTPATIENT
Start: 2022-03-06 | End: 2022-03-10 | Stop reason: HOSPADM

## 2022-03-05 RX ORDER — METOPROLOL SUCCINATE 25 MG/1
25 TABLET, EXTENDED RELEASE ORAL
Status: DISCONTINUED | OUTPATIENT
Start: 2022-03-05 | End: 2022-03-05 | Stop reason: SDUPTHER

## 2022-03-05 RX ORDER — MAGNESIUM SULFATE HEPTAHYDRATE 40 MG/ML
2 INJECTION, SOLUTION INTRAVENOUS ONCE
Status: COMPLETED | OUTPATIENT
Start: 2022-03-05 | End: 2022-03-05

## 2022-03-05 RX ORDER — CYCLOBENZAPRINE HCL 10 MG
5 TABLET ORAL 2 TIMES DAILY PRN
Status: DISCONTINUED | OUTPATIENT
Start: 2022-03-05 | End: 2022-03-10 | Stop reason: HOSPADM

## 2022-03-05 RX ORDER — LEVOTHYROXINE SODIUM 88 UG/1
88 TABLET ORAL
Status: DISCONTINUED | OUTPATIENT
Start: 2022-03-06 | End: 2022-03-10 | Stop reason: HOSPADM

## 2022-03-05 RX ORDER — GABAPENTIN 600 MG/1
600 TABLET ORAL 4 TIMES DAILY
Status: DISCONTINUED | OUTPATIENT
Start: 2022-03-05 | End: 2022-03-10 | Stop reason: HOSPADM

## 2022-03-05 RX ORDER — ATORVASTATIN CALCIUM 10 MG/1
10 TABLET, FILM COATED ORAL NIGHTLY
Status: DISCONTINUED | OUTPATIENT
Start: 2022-03-05 | End: 2022-03-10 | Stop reason: HOSPADM

## 2022-03-05 RX ORDER — SILDENAFIL CITRATE 20 MG/1
20 TABLET ORAL 2 TIMES DAILY
Status: DISCONTINUED | OUTPATIENT
Start: 2022-03-05 | End: 2022-03-10 | Stop reason: HOSPADM

## 2022-03-05 RX ORDER — POTASSIUM CHLORIDE 20 MEQ/1
40 TABLET, EXTENDED RELEASE ORAL DAILY
Status: DISCONTINUED | OUTPATIENT
Start: 2022-03-05 | End: 2022-03-10 | Stop reason: HOSPADM

## 2022-03-05 RX ORDER — LANOLIN ALCOHOL/MO/W.PET/CERES
1000 CREAM (GRAM) TOPICAL DAILY
Status: DISCONTINUED | OUTPATIENT
Start: 2022-03-05 | End: 2022-03-10 | Stop reason: HOSPADM

## 2022-03-05 RX ORDER — ROPINIROLE 0.25 MG/1
0.25 TABLET, FILM COATED ORAL NIGHTLY
Status: DISCONTINUED | OUTPATIENT
Start: 2022-03-05 | End: 2022-03-10 | Stop reason: HOSPADM

## 2022-03-05 RX ORDER — LEVOTHYROXINE SODIUM 88 UG/1
88 TABLET ORAL DAILY
Status: DISCONTINUED | OUTPATIENT
Start: 2022-03-05 | End: 2022-03-05 | Stop reason: SDUPTHER

## 2022-03-05 RX ORDER — DIGOXIN 125 MCG
125 TABLET ORAL
Status: DISCONTINUED | OUTPATIENT
Start: 2022-03-05 | End: 2022-03-10 | Stop reason: HOSPADM

## 2022-03-05 RX ORDER — GABAPENTIN 600 MG/1
600 TABLET ORAL ONCE
Status: COMPLETED | OUTPATIENT
Start: 2022-03-05 | End: 2022-03-05

## 2022-03-05 RX ADMIN — POTASSIUM CHLORIDE 40 MEQ: 1500 TABLET, EXTENDED RELEASE ORAL at 08:39

## 2022-03-05 RX ADMIN — Medication 10 ML: at 20:39

## 2022-03-05 RX ADMIN — ROPINIROLE HYDROCHLORIDE 0.25 MG: 0.25 TABLET, FILM COATED ORAL at 20:34

## 2022-03-05 RX ADMIN — AMIODARONE HYDROCHLORIDE 1 MG/MIN: 1.8 INJECTION, SOLUTION INTRAVENOUS at 04:24

## 2022-03-05 RX ADMIN — HYDROCODONE BITARTRATE AND ACETAMINOPHEN 1 TABLET: 7.5; 325 TABLET ORAL at 20:34

## 2022-03-05 RX ADMIN — DIGOXIN 125 MCG: 125 TABLET ORAL at 17:56

## 2022-03-05 RX ADMIN — MAGNESIUM SULFATE HEPTAHYDRATE 2 G: 2 INJECTION, SOLUTION INTRAVENOUS at 08:41

## 2022-03-05 RX ADMIN — ENOXAPARIN SODIUM 70 MG: 100 INJECTION SUBCUTANEOUS at 17:57

## 2022-03-05 RX ADMIN — Medication 5 MG: at 20:34

## 2022-03-05 RX ADMIN — GABAPENTIN 600 MG: 600 TABLET, FILM COATED ORAL at 17:56

## 2022-03-05 RX ADMIN — FUROSEMIDE 40 MG: 10 INJECTION, SOLUTION INTRAVENOUS at 08:39

## 2022-03-05 RX ADMIN — GABAPENTIN 600 MG: 600 TABLET, FILM COATED ORAL at 20:34

## 2022-03-05 RX ADMIN — Medication 10 ML: at 08:40

## 2022-03-05 RX ADMIN — HYDROCODONE BITARTRATE AND ACETAMINOPHEN 1 TABLET: 7.5; 325 TABLET ORAL at 04:24

## 2022-03-05 RX ADMIN — ATORVASTATIN CALCIUM 10 MG: 10 TABLET, FILM COATED ORAL at 20:34

## 2022-03-05 RX ADMIN — SILDENAFIL 20 MG: 20 TABLET, FILM COATED ORAL at 20:34

## 2022-03-05 RX ADMIN — FAMOTIDINE 20 MG: 20 TABLET, FILM COATED ORAL at 17:57

## 2022-03-05 RX ADMIN — ENOXAPARIN SODIUM 70 MG: 100 INJECTION SUBCUTANEOUS at 04:24

## 2022-03-05 RX ADMIN — Medication 10 ML: at 08:49

## 2022-03-05 RX ADMIN — HYDROCODONE BITARTRATE AND ACETAMINOPHEN 1 TABLET: 7.5; 325 TABLET ORAL at 14:21

## 2022-03-05 RX ADMIN — METOPROLOL TARTRATE 25 MG: 25 TABLET, FILM COATED ORAL at 14:22

## 2022-03-05 RX ADMIN — CYANOCOBALAMIN TAB 1000 MCG 1000 MCG: 1000 TAB at 17:57

## 2022-03-05 RX ADMIN — AMIODARONE HYDROCHLORIDE 1 MG/MIN: 1.8 INJECTION, SOLUTION INTRAVENOUS at 14:43

## 2022-03-05 RX ADMIN — GABAPENTIN 600 MG: 600 TABLET, FILM COATED ORAL at 04:18

## 2022-03-05 RX ADMIN — AMIODARONE HYDROCHLORIDE 150 MG: 1.5 INJECTION, SOLUTION INTRAVENOUS at 14:22

## 2022-03-05 RX ADMIN — SERTRALINE 50 MG: 50 TABLET, FILM COATED ORAL at 20:34

## 2022-03-05 RX ADMIN — AMIODARONE HYDROCHLORIDE 1 MG/MIN: 1.8 INJECTION, SOLUTION INTRAVENOUS at 20:34

## 2022-03-05 NOTE — NURSING NOTE
RN turned off amiodarone at 0640 due to patient's blood pressure declining. See note. Hospitalist was contacted at 0639. RN received no call back at this time, and turned off the drip. Patient's blood pressure continues to rise.Last pressure currently at 100/57 MAP 66 with heart rate of 94. Patient remains in atrial fibrillation at this time.

## 2022-03-05 NOTE — NURSING NOTE
Patient's heart rate sustaining in 70's and 80's. Patient finally resting after complaining of leg cramps through the night. See previous notes and MAR. Patient's blood pressure , dropped to  MAP of 47 and heart rate of 82. RN rechecked blood pressure multiple times. Finally systolic came up to 88, then to low 90s, and 95/49 MAP 64, heart rate of 97. Blood pressure dropped to 80's again. RN stopped amiodarone drip at 0640 after placing call out to on call provider. Still waiting for call back at this time.     Upon assessing the patient,she is alert to self and place.She is somewhat disoriented to time which has come and gone throughout the night. Heart rate now sustainig in the 90's. Patient remains in atrial fibrillation rhythm. Most recent blood pressure at 100/57 MAP 66 and heart rate at 88. RN will continue to carefully monitor the patient.

## 2022-03-05 NOTE — PROGRESS NOTES
[facility] Daily Progress Note    Patient Name: Catalina Moreno  : 1940  MRN: [id number]  Primary Care Physician:  Anayeli Costa APRN  Date of admission: 3/4/2022      Subjective      Chief Complaint: Palpitation    Subjective   Patient denies any chest pain, no nausea no vomiting    Review of Systems   All other systems reviewed and are negative.         Objective      Vitals:   Temp:  [98.3 °F (36.8 °C)-98.7 °F (37.1 °C)] 98.3 °F (36.8 °C)  Heart Rate:  [] 125  Resp:  [10-17] 15  BP: ()/(33-97) 98/68  Flow (L/min):  [2] 2    Physical Exam  Vitals and nursing note reviewed.   Constitutional:       General: She is not in acute distress.     Appearance: Normal appearance. She is well-developed. She is not ill-appearing, toxic-appearing or diaphoretic.   HENT:      Head: Normocephalic and atraumatic.      Right Ear: Ear canal and external ear normal.      Left Ear: Ear canal and external ear normal.      Nose: Nose normal. No congestion or rhinorrhea.      Mouth/Throat:      Mouth: Mucous membranes are moist.      Pharynx: No oropharyngeal exudate.   Eyes:      General: No scleral icterus.        Right eye: No discharge.         Left eye: No discharge.      Extraocular Movements: Extraocular movements intact.      Conjunctiva/sclera: Conjunctivae normal.      Pupils: Pupils are equal, round, and reactive to light.   Neck:      Thyroid: No thyromegaly.      Vascular: No carotid bruit or JVD.      Trachea: No tracheal deviation.   Cardiovascular:      Rate and Rhythm: Normal rate and regular rhythm.      Pulses: Normal pulses.      Heart sounds: Normal heart sounds. No murmur heard.    No friction rub. No gallop.   Pulmonary:      Effort: Pulmonary effort is normal. No respiratory distress.      Breath sounds: Normal breath sounds. No stridor. No wheezing, rhonchi or rales.   Chest:      Chest wall: No tenderness.   Abdominal:      General: Bowel sounds  are normal. There is no distension.      Palpations: Abdomen is soft. There is no mass.      Tenderness: There is no abdominal tenderness. There is no guarding or rebound.      Hernia: No hernia is present.   Musculoskeletal:         General: No swelling, tenderness, deformity or signs of injury. Normal range of motion.      Cervical back: Normal range of motion and neck supple. No rigidity. No muscular tenderness.      Right lower leg: No edema.      Left lower leg: No edema.   Lymphadenopathy:      Cervical: No cervical adenopathy.   Skin:     General: Skin is warm and dry.      Coloration: Skin is not jaundiced or pale.      Findings: No bruising, erythema or rash.   Neurological:      General: No focal deficit present.      Mental Status: She is alert and oriented to person, place, and time. Mental status is at baseline.      Cranial Nerves: No cranial nerve deficit.      Sensory: No sensory deficit.      Motor: No weakness or abnormal muscle tone.      Coordination: Coordination normal.   Psychiatric:         Mood and Affect: Mood normal.         Behavior: Behavior normal.         Thought Content: Thought content normal.         Judgment: Judgment normal.             Result Review    Result Review:  I have personally reviewed the results from the time of this admission to 3/5/2022 15:25 EST and agree with these findings:  [x]  Laboratory  [x]  Microbiology  [x]  Radiology  []  EKG/Telemetry   []  Cardiology/Vascular   []  Pathology  []  Old records  []  Other:  Most notable findings include:         Assessment/Plan      Brief Patient Summary:  Catalina Moreno is a 81 y.o. female who       enoxaparin, 1 mg/kg, Subcutaneous, Q12H  furosemide, 40 mg, Intravenous, Daily  metoprolol tartrate, 25 mg, Oral, Q12H  potassium chloride, 40 mEq, Oral, Daily  sodium chloride, 10 mL, Intravenous, Q12H  sodium chloride, 10 mL, Intravenous, Q12H       amiodarone, 1 mg/min, Last Rate: 1 mg/min (03/05/22 5503)         Active  Hospital Problems:  Active Hospital Problems    Diagnosis    • **Atrial fibrillation with RVR (HCC)    • Pulmonary hypertension, unspecified ([facility])    • Restless legs syndrome    • Hypothyroidism, unspecified    • Stage 2 chronic kidney disease    • Acute on chronic diastolic congestive heart failure (HCC)    • Lumbar radiculopathy    • Primary fibromyalgia syndrome      Plan:     A. fib with RVR  -EKG shows A. fib with RVR at a rate of 158 without obvious acute ST changes and a QTC of 495 ms  - now noted on iv amiodarone.  - therapeutic dose lovenox sc  -Continue beta-blocker and digoxin, hold p.o. Cardizem while on infusion  -TSH: 3.530  -Troponin less than 0.010, trend  -Continue telemetry  -Lovenox twice daily for now, consider alternative p.o. anticoagulation or Case management consult given patient's inability to afford Eliquis on an outpatient basis  - consulted cardiology service.     Acute on chronic heart failure with preserved ejection fraction improving  -proBNP: 8657.0 with pulmonary edema noted on CT of chest as well as CT of abdomen  -Echocardiogram from 7/26/2021 showed an EF of 60% with normal left ventricular systolic function with moderate aortic valve regurgitation  -Repeat echocardiogram  -40 mg Lasix given in ED, continue and monitor kidney function I's and O's closely  -2 g sodium diet  -Daily weight  -Continue beta-blocker     Anemia  -Hemoglobin: 11.1 with a decreased MCV  -Check iron profile  -Monitor while admitted     History of CKD stage II  -Serum creatinine: 0.76 with an EGFR of 79.3  -Monitor while admitted     Hypothyroidism  -Levothyroxine     RLS  -Requip     Chronic pain/fibromyalgia  -Norco and Lyrica     Depression/anxiety  -Zoloft     DVT prophylaxis:  Medical DVT prophylaxis orders are present.     CODE STATUS:    Admission Status:  I believe this patient meets observation status.    DVT prophylaxis:  Medical DVT prophylaxis orders are present.    CODE STATUS:    Code Status  (Patient has no pulse and is not breathing): CPR (Attempt to Resuscitate)  Medical Interventions (Patient has pulse or is breathing): Full Support      Disposition:  I expect patient to be discharged as per clinical course.    This patient has been examined wearing appropriate Personal Protective Equipment and discussed with hospital infection control department. 03/05/22      Electronically signed by Bryce Hastings MD, 03/05/22, 15:25 EST.  [facility]ist [facility]

## 2022-03-05 NOTE — PLAN OF CARE
Patient tolerating amiodarone drip. She remains in atrial rhythm. Patient remains very anxious. PRN pain medication given. Hospitalist contacted multiple time throughout the night. See documentation. Electrolyte replacement protocol to be initiated. Fall risk protocol in place; bed alarm on.    Problem: Adult Inpatient Plan of Care  Goal: Plan of Care Review  Outcome: Met  Goal: Patient-Specific Goal (Individualized)  Outcome: Met  Goal: Absence of Hospital-Acquired Illness or Injury  Outcome: Met  Intervention: Identify and Manage Fall Risk  Recent Flowsheet Documentation  Taken 3/5/2022 0600 by Tanika Pedraza RN  Safety Promotion/Fall Prevention:   activity supervised   assistive device/personal items within reach   clutter free environment maintained   fall prevention program maintained   lighting adjusted   nonskid shoes/slippers when out of bed   room organization consistent   safety round/check completed  Taken 3/5/2022 0341 by Tanika Pedraza, RN  Safety Promotion/Fall Prevention:   activity supervised   assistive device/personal items within reach   clutter free environment maintained   fall prevention program maintained   lighting adjusted   nonskid shoes/slippers when out of bed   room organization consistent   safety round/check completed  Taken 3/5/2022 0200 by Tanika Pedraza, RN  Safety Promotion/Fall Prevention:   activity supervised   assistive device/personal items within reach   clutter free environment maintained   fall prevention program maintained   lighting adjusted   nonskid shoes/slippers when out of bed   room organization consistent   safety round/check completed  Taken 3/5/2022 0000 by Tanika Pedraza, RN  Safety Promotion/Fall Prevention:   activity supervised   assistive device/personal items within reach   clutter free environment maintained   fall prevention program maintained   lighting adjusted   nonskid shoes/slippers when out of bed   room organization consistent   safety round/check  completed  Taken 3/4/2022 2200 by Tanika Pedraza RN  Safety Promotion/Fall Prevention:   activity supervised   assistive device/personal items within reach   clutter free environment maintained   fall prevention program maintained   lighting adjusted   nonskid shoes/slippers when out of bed   room organization consistent   safety round/check completed  Taken 3/4/2022 2000 by Tanika Pedraza RN  Safety Promotion/Fall Prevention:   activity supervised   assistive device/personal items within reach   clutter free environment maintained   fall prevention program maintained   lighting adjusted   nonskid shoes/slippers when out of bed   room organization consistent   safety round/check completed  Intervention: Prevent Skin Injury  Recent Flowsheet Documentation  Taken 3/5/2022 0341 by Tanika Pedraza RN  Body Position: position changed independently  Skin Protection:   adhesive use limited   incontinence pads utilized   pulse oximeter probe site changed   transparent dressing maintained   tubing/devices free from skin contact  Taken 3/5/2022 0000 by Tanika Pedraza RN  Skin Protection:   adhesive use limited   incontinence pads utilized   transparent dressing maintained   tubing/devices free from skin contact  Taken 3/4/2022 2000 by Tanika Pedraza RN  Skin Protection:   adhesive use limited   pulse oximeter probe site changed   transparent dressing maintained   tubing/devices free from skin contact  Intervention: Prevent and Manage VTE (venous thromboembolism) Risk  Recent Flowsheet Documentation  Taken 3/5/2022 0341 by Tanika Pedraza RN  VTE Prevention/Management: other (see comments)  Taken 3/5/2022 0000 by Tanika Pedraza RN  VTE Prevention/Management: other (see comments)  Taken 3/4/2022 2000 by Tanika Pedraza RN  VTE Prevention/Management: other (see comments)  Intervention: Prevent Infection  Recent Flowsheet Documentation  Taken 3/5/2022 0600 by Tanika Pedraza RN  Infection Prevention:   environmental surveillance performed   equipment  surfaces disinfected   hand hygiene promoted   personal protective equipment utilized   rest/sleep promoted   single patient room provided  Taken 3/5/2022 0341 by Tanika Pedraza RN  Infection Prevention:   environmental surveillance performed   equipment surfaces disinfected   hand hygiene promoted   personal protective equipment utilized   rest/sleep promoted   single patient room provided  Taken 3/5/2022 0200 by Tanika Pedraza RN  Infection Prevention:   environmental surveillance performed   hand hygiene promoted   equipment surfaces disinfected   personal protective equipment utilized   rest/sleep promoted   single patient room provided  Taken 3/5/2022 0000 by Tanika Pedraza RN  Infection Prevention:   environmental surveillance performed   equipment surfaces disinfected   hand hygiene promoted   personal protective equipment utilized   rest/sleep promoted   single patient room provided  Taken 3/4/2022 2200 by Tanika Pedraza RN  Infection Prevention:   environmental surveillance performed   equipment surfaces disinfected   hand hygiene promoted   personal protective equipment utilized   rest/sleep promoted   single patient room provided  Taken 3/4/2022 2000 by Tanika Pedraza RN  Infection Prevention:   environmental surveillance performed   equipment surfaces disinfected   hand hygiene promoted   personal protective equipment utilized   rest/sleep promoted   single patient room provided  Goal: Optimal Comfort and Wellbeing  Outcome: Met  Intervention: Provide Person-Centered Care  Recent Flowsheet Documentation  Taken 3/5/2022 0341 by Tanika Pedraza RN  Trust Relationship/Rapport:   care explained   emotional support provided   empathic listening provided  Taken 3/5/2022 0000 by Tanika Pedraza RN  Trust Relationship/Rapport:   care explained   choices provided   emotional support provided   empathic listening provided   questions answered   questions encouraged  Taken 3/4/2022 2000 by Tanika Pedraza RN Trust  Relationship/Rapport:   care explained   choices provided   emotional support provided   empathic listening provided   questions answered  Goal: Readiness for Transition of Care  Outcome: Met  Intervention: Mutually Develop Transition Plan  Recent Flowsheet Documentation  Taken 3/4/2022 2051 by Tanika Pedraza RN  Transportation Anticipated:   family or friend will provide   health plan transportation  Patient/Family Anticipated Services at Transition:      home health care   rehabilitation services  Patient/Family Anticipates Transition to:   home   home with family   inpatient rehabilitation facility   home with help/services  Taken 3/4/2022 2048 by Tanika Pedraza RN  Equipment Currently Used at Home: walker, rolling     Problem: Heart Failure Comorbidity  Goal: Maintenance of Heart Failure Symptom Control  Outcome: Met  Intervention: Maintain Heart Failure-Management Strategies  Recent Flowsheet Documentation  Taken 3/5/2022 0600 by Tanika Pedraza RN  Medication Review/Management: medications reviewed  Taken 3/5/2022 0341 by Tanika Pedraza RN  Medication Review/Management: medications reviewed  Taken 3/5/2022 0200 by Tanika Pedraza RN  Medication Review/Management: medications reviewed  Taken 3/5/2022 0000 by Tanika Pedraza RN  Medication Review/Management: medications reviewed  Taken 3/4/2022 2200 by Tanika Pedraza RN  Medication Review/Management: medications reviewed     Problem: Hypertension Comorbidity  Goal: Blood Pressure in Desired Range  Outcome: Met  Intervention: Maintain Hypertension-Management Strategies  Recent Flowsheet Documentation  Taken 3/5/2022 0600 by Tanika Pedraza RN  Medication Review/Management: medications reviewed  Taken 3/5/2022 0341 by Tanika Pedraza RN  Medication Review/Management: medications reviewed  Taken 3/5/2022 0200 by Tanika Pedraza RN  Medication Review/Management: medications reviewed  Taken 3/5/2022 0000 by Tanika Pedraza RN  Medication Review/Management: medications reviewed  Taken  3/4/2022 2200 by Tanika Pedraza RN  Medication Review/Management: medications reviewed     Problem: Skin Injury Risk Increased  Goal: Skin Health and Integrity  Outcome: Met  Intervention: Optimize Skin Protection  Recent Flowsheet Documentation  Taken 3/5/2022 0341 by Tanika Pderaza RN  Pressure Reduction Techniques:   frequent weight shift encouraged   weight shift assistance provided  Head of Bed (HOB) Positioning: HOB elevated  Pressure Reduction Devices: pressure-redistributing mattress utilized  Skin Protection:   adhesive use limited   incontinence pads utilized   pulse oximeter probe site changed   transparent dressing maintained   tubing/devices free from skin contact  Taken 3/5/2022 0000 by Tanika Pedraza RN  Pressure Reduction Techniques:   frequent weight shift encouraged   weight shift assistance provided  Pressure Reduction Devices: pressure-redistributing mattress utilized  Skin Protection:   adhesive use limited   incontinence pads utilized   transparent dressing maintained   tubing/devices free from skin contact  Taken 3/4/2022 2000 by Tanika Pedraza RN  Pressure Reduction Techniques:   frequent weight shift encouraged   weight shift assistance provided  Pressure Reduction Devices: pressure-redistributing mattress utilized  Skin Protection:   adhesive use limited   pulse oximeter probe site changed   transparent dressing maintained   tubing/devices free from skin contact     Problem: Fall Injury Risk  Goal: Absence of Fall and Fall-Related Injury  Outcome: Met  Intervention: Identify and Manage Contributors to Fall Injury Risk  Recent Flowsheet Documentation  Taken 3/5/2022 0600 by Tanika Pedraza RN  Medication Review/Management: medications reviewed  Taken 3/5/2022 0341 by Tanika Pedraza RN  Medication Review/Management: medications reviewed  Self-Care Promotion: independence encouraged  Taken 3/5/2022 0200 by Tanika Pedraza RN  Medication Review/Management: medications reviewed  Taken 3/5/2022 0000 by Keila  BRENDAN Sagastume  Medication Review/Management: medications reviewed  Taken 3/4/2022 2200 by Tanika Pedraza RN  Medication Review/Management: medications reviewed  Taken 3/4/2022 2000 by Tanika Pedraza RN  Self-Care Promotion: independence encouraged  Intervention: Promote Injury-Free Environment  Recent Flowsheet Documentation  Taken 3/5/2022 0600 by Tanika Pedraza RN  Safety Promotion/Fall Prevention:   activity supervised   assistive device/personal items within reach   clutter free environment maintained   fall prevention program maintained   lighting adjusted   nonskid shoes/slippers when out of bed   room organization consistent   safety round/check completed  Taken 3/5/2022 0341 by Tanika Pedraza RN  Safety Promotion/Fall Prevention:   activity supervised   assistive device/personal items within reach   clutter free environment maintained   fall prevention program maintained   lighting adjusted   nonskid shoes/slippers when out of bed   room organization consistent   safety round/check completed  Taken 3/5/2022 0200 by Tanika Pedraza RN  Safety Promotion/Fall Prevention:   activity supervised   assistive device/personal items within reach   clutter free environment maintained   fall prevention program maintained   lighting adjusted   nonskid shoes/slippers when out of bed   room organization consistent   safety round/check completed  Taken 3/5/2022 0000 by Tanika Pedraza RN  Safety Promotion/Fall Prevention:   activity supervised   assistive device/personal items within reach   clutter free environment maintained   fall prevention program maintained   lighting adjusted   nonskid shoes/slippers when out of bed   room organization consistent   safety round/check completed  Taken 3/4/2022 2200 by Tanika Pedraza RN  Safety Promotion/Fall Prevention:   activity supervised   assistive device/personal items within reach   clutter free environment maintained   fall prevention program maintained   lighting adjusted   nonskid  shoes/slippers when out of bed   room organization consistent   safety round/check completed  Taken 3/4/2022 2000 by Tanika Pedraza, RN  Safety Promotion/Fall Prevention:   activity supervised   assistive device/personal items within reach   clutter free environment maintained   fall prevention program maintained   lighting adjusted   nonskid shoes/slippers when out of bed   room organization consistent   safety round/check completed   Goal Outcome Evaluation:

## 2022-03-05 NOTE — NURSING NOTE
RN placed call out to provider on multiple occassions throughout the night for [facility]ina Chase. Patient was on cardizem drip at 15 when nightshift team arrived. Patient complaining of severe back pain. Provider Noemy AUGUSTE returned the call. She ordered prn hydrocodone-acetaminophen tablet for the patient for pain.    RN reached out to provider again at 2221 due to the patient's heart rate sustaining in the 130s and jumping up to as high as 148 at times while on Cardizem drip at maxed out at 15. Noemy AUGUSTE returned the call. She ordered to d/c the cardizem drip, and to begin the patient on amiodarone drip. RN made KALYANI aware that this patient had no Cardiology consult ordered at the time. Noemy AUGUSTE placed an order for consult. RN called in the consult for atrial fibrillation. Myla from Cardiology office stated to RN that Dr. Walsh would be seeing the patient.

## 2022-03-05 NOTE — CONSULTS
[facility]        Subjective:     Encounter Date:03/04/2022      Patient ID: Catalina Moreno is a 81 y.o. female.    Chief Complaint: back pain, shortness of breath, palpitations    Referring Physician: Noemy Cedillo     HPI:  Catalina Moreno is a 81 y.o. female who presents with back pain, dyspnea, and palpitations.  Ms. Moreno is a patient of Dr. Alcantara and has seen Dr. Gonzalez recently . Pmh includes paroxsymal atrial fibrillation on Eliquis for anticoagulation, on digoxin and Toprol XL, HTN, HLD, TIA, normal LV function on prior ECHO.    Ms. Moreno reports back pain over the last several days.  She also notes dyspnea and lower extremity edema. She was noted to be in afib with RVR. She was started on amiodarone. She does mention she stopped taking her Eliquis roughly 1 week ago secondary to cost.  Cardiology has been consulted for afib with RVR.  Patient underwent CT PE protocol which showed no PE, pulmonary edema noted with pleural effusions.        Past Medical History:   Diagnosis Date   • Anxiety    • Arthritis    • Atrial fibrillation (HCC)     paroxysmal   • Broken shoulder     left-- due to fall 11-7-19 was at [facility]   • Buttock pain     rt side   • DDD (degenerative disc disease), lumbar    • Depression    • Edema     9/2020 foot   • GERD (gastroesophageal reflux disease)    • H/O fall    • Hip pain     rt   • Hypertension    • Hypothyroidism    • Insomnia    • Leg pain     rt   • Low back pain    • Neuropathy    • PONV (postoperative nausea and vomiting)    • Pulmonary hypertension (HCC)    • Radiculopathy    • Restless legs    • Spondylolisthesis    • Urinary incontinence        Past Surgical History:   Procedure Laterality Date   • BACK SURGERY  07/19/2018    PDC &  PSF L3-L5 insitu   • CARDIAC CATHETERIZATION N/A 4/2/2021    Procedure: Cardiac Catheterization/Vascular Study;  Surgeon: Barrett Evans MD;  Location: [facility] CATH INVASIVE LOCATION;  Service: Cardiovascular;   Laterality: N/A;   • CHOLECYSTECTOMY     • COLONOSCOPY     • HYSTERECTOMY     • ROTATOR CUFF REPAIR Left        Family History   Problem Relation Age of Onset   • Cancer Father    • Diabetes Son    • Cancer Paternal Aunt    • Heart disease Paternal Aunt    • Cancer Paternal Uncle        Social History     Socioeconomic History   • Marital status:    Tobacco Use   • Smoking status: Never Smoker   • Smokeless tobacco: Never Used   Vaping Use   • Vaping Use: Never used   Substance and Sexual Activity   • Alcohol use: No   • Drug use: No   • Sexual activity: Defer         Allergies   Allergen Reactions   • Baclofen Rash   • Codeine Nausea Only   • Ibuprofen Unknown (See Comments)     Patient doesn't know----Motin   • Methocarbamol Unknown (See Comments)     Patient doesn't know   • Naproxen Unknown (See Comments)     Pt. Doesn't know    • Tizanidine Hcl Hallucinations   • Tolmetin Dizziness     Pt. Doesn't know-- same as Tolectin       Current Medications:   Scheduled Meds:enoxaparin, 1 mg/kg, Subcutaneous, Q12H  furosemide, 40 mg, Intravenous, Daily  potassium chloride, 40 mEq, Oral, Daily  sodium chloride, 10 mL, Intravenous, Q12H  sodium chloride, 10 mL, Intravenous, Q12H      Continuous Infusions:amiodarone, 1 mg/min, Last Rate: Stopped (03/05/22 0640)        Review of Systems   Constitutional: Negative for chills, diaphoresis and malaise/fatigue.   Cardiovascular: Positive for dyspnea on exertion and leg swelling. Negative for chest pain, irregular heartbeat, near-syncope, orthopnea, palpitations, paroxysmal nocturnal dyspnea and syncope.   Respiratory: Positive for shortness of breath. Negative for cough, sleep disturbances due to breathing and sputum production.    Musculoskeletal: Positive for back pain.   Gastrointestinal: Negative for change in bowel habit.   Genitourinary: Negative for urgency.   Neurological: Negative for dizziness and headaches.   Psychiatric/Behavioral: Negative for altered mental  "status.            Objective:         /97 (BP Location: Left arm, Patient Position: Lying)   Pulse (!) 140   Temp 98.7 °F (37.1 °C) (Oral)   Resp 16   Ht 165.1 cm (65\")   Wt 69.9 kg (154 lb)   SpO2 (!) 88%   Breastfeeding No   BMI 25.63 kg/m²     Physical Exam:  General Appearance:    Alert, cooperative, in no acute distress                                Head: Atraumatic, normocephalic, PERRLA               Neck:   supple,  no JVD   Lungs:     2L NC, fine crackles in bases    Heart:    Irregular rate and rhythm, S1S2, 2/6 murmur   Abdomen:     Normal bowel sounds, no masses, no organomegaly, soft  non-tender, non-distended, no guarding, no rebound  tenderness   Extremities:   Moves all extremities well, + bilat edema, no cyanosis, no  redness   Pulses:   Pulses palpable and equal bilaterally   Skin:   No bleeding, bruising or rash   Neurologic:   Awake, alert, oriented x3                 ASCVD RIsk Score::  The ASCVD Risk score ([place] FILI Jr., et al., 2013) failed to calculate for the following reasons:    The 2013 ASCVD risk score is only valid for ages 40 to 79      Lab Review:     Results from last 7 days   Lab Units 03/04/22 2320 03/04/22  1309   SODIUM mmol/L 137 137   POTASSIUM mmol/L 3.2* 4.1   CHLORIDE mmol/L 95* 99   CO2 mmol/L 25.0 22.0   BUN mg/dL 10 9   CREATININE mg/dL 0.85 0.76   GLUCOSE mg/dL 142* 136*   CALCIUM mg/dL 9.4 9.6   AST (SGOT) U/L  --  18   ALT (SGPT) U/L  --  7     Results from last 7 days   Lab Units 03/04/22 2107 03/04/22  1309   TROPONIN T ng/mL <0.010 <0.010     Results from last 7 days   Lab Units 03/04/22 2320 03/04/22  1309   WBC 10*3/mm3 8.40 11.70*   HEMOGLOBIN g/dL 10.7* 11.1*   HEMATOCRIT % 33.9* 34.1   PLATELETS 10*3/mm3 250 249         Results from last 7 days   Lab Units 03/04/22 2320 03/04/22  1309   MAGNESIUM mg/dL 1.8 1.8           Invalid input(s): LDLCALC  Results from last 7 days   Lab Units 03/04/22  1309   PROBNP pg/mL 8,657.0*     Results from last " 7 days   Lab Units 03/04/22  1309   TSH uIU/mL 3.530       Recent Radiology:  Imaging Results (Most Recent)     Procedure Component Value Units Date/Time    CT Chest Pulmonary Embolism [[id number]] Collected: 03/04/22 1608     Updated: 03/04/22 1615    Narrative:      CT CHEST PULMONARY EMBOLISM-     Date of Exam: 3/4/2022 3:45 PM     Indication: PE suspected, high prob.  Chest pain. Tachycardia.     Comparison: CT chest 1/12/2019, AP portable chest 11/13/2021.     Technique: Serial and axial CT images of the chest were obtained  following the uneventful intravenous administration of 100 cc Isovue-370  contrast. Reconstructions in the coronal and sagittal planes were also  performed.  Automated exposure control and iterative reconstruction  methods were used.     FINDINGS:     There is no pulmonary embolism. There is no thoracic aortic aneurysm or  aortic dissection.     There is mild cardiomegaly. Moderate coronary artery calcifications are  present. There is no pericardial effusion. Trace bibasilar pleural  effusions are present.     Smooth interstitial thickening with intervening geographic groundglass  densities are present in both lungs, may represent interstitial and  alveolar edema. Underlying air trapping not excluded. There is chronic  bandlike scarring within the lingular segment left upper lobe, and  within the right middle lobe, similar to 1/12/2019. No acute lung  consolidations are identified.     There is mild bronchial wall thickening particularly in the lower lobes.  No shruti mucus plugging is identified.     The thyroid gland appears atrophic. Cholecystectomy changes are present.  The pancreas appears atrophic. The remainder the imaged upper abdominal  organs are within normal limits. 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     Moderate cervical and thoracic spondylosis. No acute osseous  abnormality.          Impression:      1. No pulmonary embolism.  2.  Features suggestive of interstitial and alveolar pulmonary edema with  small layering bilateral pleural effusions.  3. Mild cardiomegaly. Moderate left anterior descending coronary artery  calcification.              Electronically Signed By-Suzanne King MD On:3/4/2022 4:13 PM  This report was finalized on [id number] by  Suzanne King MD.    CT Abdomen Pelvis Without Contrast [[id number]] Collected: 03/04/22 1449     Updated: 03/04/22 1458    Narrative:      CT ABDOMEN PELVIS WO CONTRAST-     Date of Exam: 3/4/2022 2:33 PM     Indication: Abdominal pain, fever     Comparison: CT head without contrast 7/25/2021.     Technique: Contiguous axial CT images were obtained from the lung bases  to the pubic symphysis without contrast. Sagittal and coronal  reconstructions were performed.  Automated exposure control and  iterative reconstruction methods were used.     FINDINGS:     Trace free fluid is seen within the pelvis. There is mild gaseous  distention of the sigmoid colon. The bowel loops do not appear  significantly thickened or inflamed, and there is no indication of  high-grade bowel obstruction. The appendix is not discretely visualized  but no pericecal inflammation is seen. Mild colonic stool burden is  present.     Cholecystectomy. Liver, spleen, adrenals, and kidneys have a normal  noncontrast appearance. Pancreas is atrophic and largely fatty replaced.  The urinary bladder is normal. Small rectocele is present. Hysterectomy.     Heart size is normal. Trace left basilar pleural effusion is present.  Smooth interstitial thickening is present in the lung bases suggestive  of mild interstitial edema. There is some chronic scarring within the  lingula and right middle lobe, similar to 7/25/2021. There is mild  cardiac enlargement. No coronary artery calcifications are appreciated.     Advanced facet arthropathy is present in the lower lumbar spine. There  is grade 1 anterolisthesis L3 upon L4 and L4 upon L5,  unchanged. No  acute osseous abnormalities are identified.          Impression:         1. No acute findings within the abdomen or pelvis.  2. Trace pelvic free fluid, nonspecific.  3. FINDINGS suggestive of mild interstitial edema lung bases. Trace left  pleural effusion is present.           Electronically Signed By-Suzanne King MD On:3/4/2022 2:56 PM  This report was finalized on [id number] by  Suzanne King MD.            ECHOCARDIOGRAM:    Results for orders placed during the hospital encounter of 03/04/22    Adult Transthoracic Echo Complete W/ Cont if Necessary Per Protocol    Interpretation Summary  · Estimated right ventricular systolic pressure from tricuspid regurgitation is mildly elevated (35-45 mmHg).  · Mild pulmonary hypertension is present.  · Left atrial volume is severely increased.  · The right atrial cavity is severely dilated.  · Abnormal mitral valve structure consistent with dilated annulus.  · Estimated left ventricular EF = 45% Left ventricular ejection fraction appears to be 46 - 50%. Left ventricular systolic function is mildly decreased.  · Left ventricular diastolic function is consistent with (grade II w/high LAP) pseudonormalization.  · The right ventricular cavity is borderline dilated.            Assessment:         Active Hospital Problems    Diagnosis  POA   • **Atrial fibrillation with RVR (HCC) [I48.91]  Yes   • Pulmonary hypertension, unspecified (HCC) [I27.20]  Yes   • Restless legs syndrome [G25.81]  Yes   • Hypothyroidism, unspecified [E03.9]  Yes   • Stage 2 chronic kidney disease [N18.2]  Yes   • Acute on chronic diastolic congestive heart failure (HCC) [I50.33]  Yes   • Lumbar radiculopathy [M54.16]  Yes   • Primary fibromyalgia syndrome [M79.7]  Yes     1. Atrial fibrillation with RVR  - outpt medications include digoxin, Toprol XL  - on amiodarone gtt  - CHADS VAsc at least 3 anticoagulation lovenox- was on Eliquis at home    2. Acute on chronic systolic and  diastolic HF  - LVEF 46-50%, grade 2 DD, pulmonary HTN  - IV diuresis    3. Back pain    4. CKD  - creatinine normal    5. HTN       Plan:   Restart beta blockers  Check digoxin level  Replace potassium to maintain K at 4  Check Mg level  On amiodarone gtt  Anticoagulation with lovenox while in house, patient could not afford Eliquis, may have to do coumadin               Dilcia Dugan, KALYANI  03/05/22  12:31 EST

## 2022-03-05 NOTE — NURSING NOTE
RN called on call provider during Epic downtime for pain relief for patient. PRN pain medication was not effective.Provider Noemy AUGUSTE returned the call. Patient complaining of bilateral legs and foot cramps. Patient stated she gets these cramps at home as well. She walks around until the pain resides. APRN ordered a one time dose 600 mg of gabapentin. RN gave during downtime. Intervention not effective per the patient.      RN called again and spoke to provider Noemy AUGUSTE for pain relief for the patient.Patient moaning in pain,and not resting at all through the night. APRN ordered potassium replacement protocol and magnesium. Patient's magnesium was 1.8 and her potassium at 3.2 according to previously drawn lab results. RN attempted other pain interventions throughout the night such as repositioning, warm blankets, and propping feet up on pillows,and pillow behind back. RN also provided a calm and quiet environment for the patient with decreased auditory stimulation. Patient requested the lights be turned out as much as possible throughout the night.

## 2022-03-06 ENCOUNTER — APPOINTMENT (OUTPATIENT)
Dept: GENERAL RADIOLOGY | Facility: HOSPITAL | Age: 82
End: 2022-03-06

## 2022-03-06 LAB
ANION GAP SERPL CALCULATED.3IONS-SCNC: 15 MMOL/L (ref 5–15)
BASOPHILS # BLD AUTO: 0.1 10*3/MM3 (ref 0–0.2)
BASOPHILS NFR BLD AUTO: 2.1 % (ref 0–1.5)
BUN SERPL-MCNC: 20 MG/DL (ref 8–23)
BUN/CREAT SERPL: 19 (ref 7–25)
CALCIUM SPEC-SCNC: 8.9 MG/DL (ref 8.6–10.5)
CHLORIDE SERPL-SCNC: 97 MMOL/L (ref 98–107)
CO2 SERPL-SCNC: 24 MMOL/L (ref 22–29)
CREAT SERPL-MCNC: 1.05 MG/DL (ref 0.57–1)
DEPRECATED RDW RBC AUTO: 49.9 FL (ref 37–54)
EGFRCR SERPLBLD CKD-EPI 2021: 53.8 ML/MIN/1.73
EOSINOPHIL # BLD AUTO: 0.2 10*3/MM3 (ref 0–0.4)
EOSINOPHIL NFR BLD AUTO: 2.6 % (ref 0.3–6.2)
ERYTHROCYTE [DISTWIDTH] IN BLOOD BY AUTOMATED COUNT: 18.6 % (ref 12.3–15.4)
GLUCOSE SERPL-MCNC: 105 MG/DL (ref 65–99)
HCT VFR BLD AUTO: 33.4 % (ref 34–46.6)
HGB BLD-MCNC: 10.8 G/DL (ref 12–15.9)
LYMPHOCYTES # BLD AUTO: 2.4 10*3/MM3 (ref 0.7–3.1)
LYMPHOCYTES NFR BLD AUTO: 39.5 % (ref 19.6–45.3)
MAGNESIUM SERPL-MCNC: 2.4 MG/DL (ref 1.6–2.4)
MCH RBC QN AUTO: 24.9 PG (ref 26.6–33)
MCHC RBC AUTO-ENTMCNC: 32.3 G/DL (ref 31.5–35.7)
MCV RBC AUTO: 77.2 FL (ref 79–97)
MONOCYTES # BLD AUTO: 0.6 10*3/MM3 (ref 0.1–0.9)
MONOCYTES NFR BLD AUTO: 10.2 % (ref 5–12)
NEUTROPHILS NFR BLD AUTO: 2.7 10*3/MM3 (ref 1.7–7)
NEUTROPHILS NFR BLD AUTO: 45.6 % (ref 42.7–76)
NRBC BLD AUTO-RTO: 0.1 /100 WBC (ref 0–0.2)
PLATELET # BLD AUTO: 208 10*3/MM3 (ref 140–450)
PMV BLD AUTO: 8.4 FL (ref 6–12)
POTASSIUM SERPL-SCNC: 3.8 MMOL/L (ref 3.5–5.2)
QT INTERVAL: 305 MS
RBC # BLD AUTO: 4.33 10*6/MM3 (ref 3.77–5.28)
SODIUM SERPL-SCNC: 136 MMOL/L (ref 136–145)
WBC NRBC COR # BLD: 6 10*3/MM3 (ref 3.4–10.8)

## 2022-03-06 PROCEDURE — 25010000002 AMIODARONE IN DEXTROSE 5% 360-4.14 MG/200ML-% SOLUTION: Performed by: NURSE PRACTITIONER

## 2022-03-06 PROCEDURE — 63710000001 ONDANSETRON PER 8 MG: Performed by: PHYSICIAN ASSISTANT

## 2022-03-06 PROCEDURE — 85025 COMPLETE CBC W/AUTO DIFF WBC: CPT | Performed by: PHYSICIAN ASSISTANT

## 2022-03-06 PROCEDURE — 71045 X-RAY EXAM CHEST 1 VIEW: CPT

## 2022-03-06 PROCEDURE — 83735 ASSAY OF MAGNESIUM: CPT | Performed by: PHYSICIAN ASSISTANT

## 2022-03-06 PROCEDURE — 02HV33Z INSERTION OF INFUSION DEVICE INTO SUPERIOR VENA CAVA, PERCUTANEOUS APPROACH: ICD-10-PCS | Performed by: INTERNAL MEDICINE

## 2022-03-06 PROCEDURE — 25010000002 ENOXAPARIN PER 10 MG: Performed by: PHYSICIAN ASSISTANT

## 2022-03-06 PROCEDURE — 25010000002 ONDANSETRON PER 1 MG: Performed by: PHYSICIAN ASSISTANT

## 2022-03-06 PROCEDURE — 99232 SBSQ HOSP IP/OBS MODERATE 35: CPT | Performed by: HOSPITALIST

## 2022-03-06 PROCEDURE — 80048 BASIC METABOLIC PNL TOTAL CA: CPT | Performed by: PHYSICIAN ASSISTANT

## 2022-03-06 PROCEDURE — C1751 CATH, INF, PER/CENT/MIDLINE: HCPCS

## 2022-03-06 PROCEDURE — G0378 HOSPITAL OBSERVATION PER HR: HCPCS

## 2022-03-06 RX ORDER — SODIUM CHLORIDE 0.9 % (FLUSH) 0.9 %
10 SYRINGE (ML) INJECTION EVERY 12 HOURS SCHEDULED
Status: DISCONTINUED | OUTPATIENT
Start: 2022-03-06 | End: 2022-03-10 | Stop reason: SDUPTHER

## 2022-03-06 RX ORDER — SODIUM CHLORIDE 0.9 % (FLUSH) 0.9 %
20 SYRINGE (ML) INJECTION AS NEEDED
Status: DISCONTINUED | OUTPATIENT
Start: 2022-03-06 | End: 2022-03-10 | Stop reason: HOSPADM

## 2022-03-06 RX ORDER — SODIUM CHLORIDE 0.9 % (FLUSH) 0.9 %
10 SYRINGE (ML) INJECTION AS NEEDED
Status: DISCONTINUED | OUTPATIENT
Start: 2022-03-06 | End: 2022-03-10 | Stop reason: HOSPADM

## 2022-03-06 RX ADMIN — CYANOCOBALAMIN TAB 1000 MCG 1000 MCG: 1000 TAB at 10:12

## 2022-03-06 RX ADMIN — FAMOTIDINE 20 MG: 20 TABLET, FILM COATED ORAL at 10:13

## 2022-03-06 RX ADMIN — ENOXAPARIN SODIUM 70 MG: 100 INJECTION SUBCUTANEOUS at 16:44

## 2022-03-06 RX ADMIN — HYDROCODONE BITARTRATE AND ACETAMINOPHEN 1 TABLET: 7.5; 325 TABLET ORAL at 11:21

## 2022-03-06 RX ADMIN — ONDANSETRON 4 MG: 2 INJECTION INTRAMUSCULAR; INTRAVENOUS at 19:45

## 2022-03-06 RX ADMIN — HYDROCODONE BITARTRATE AND ACETAMINOPHEN 1 TABLET: 7.5; 325 TABLET ORAL at 21:32

## 2022-03-06 RX ADMIN — AMIODARONE HYDROCHLORIDE 0.5 MG/MIN: 1.8 INJECTION, SOLUTION INTRAVENOUS at 23:11

## 2022-03-06 RX ADMIN — DIGOXIN 125 MCG: 125 TABLET ORAL at 11:21

## 2022-03-06 RX ADMIN — Medication 10 ML: at 20:48

## 2022-03-06 RX ADMIN — ROPINIROLE HYDROCHLORIDE 0.25 MG: 0.25 TABLET, FILM COATED ORAL at 20:47

## 2022-03-06 RX ADMIN — Medication 10 ML: at 20:47

## 2022-03-06 RX ADMIN — GABAPENTIN 600 MG: 600 TABLET, FILM COATED ORAL at 10:13

## 2022-03-06 RX ADMIN — POTASSIUM CHLORIDE 40 MEQ: 1500 TABLET, EXTENDED RELEASE ORAL at 10:13

## 2022-03-06 RX ADMIN — METOPROLOL TARTRATE 25 MG: 25 TABLET, FILM COATED ORAL at 20:46

## 2022-03-06 RX ADMIN — HYDROCODONE BITARTRATE AND ACETAMINOPHEN 1 TABLET: 7.5; 325 TABLET ORAL at 05:05

## 2022-03-06 RX ADMIN — AMIODARONE HYDROCHLORIDE 1 MG/MIN: 1.8 INJECTION, SOLUTION INTRAVENOUS at 07:54

## 2022-03-06 RX ADMIN — ATORVASTATIN CALCIUM 10 MG: 10 TABLET, FILM COATED ORAL at 20:47

## 2022-03-06 RX ADMIN — Medication 10 ML: at 21:33

## 2022-03-06 RX ADMIN — AMIODARONE HYDROCHLORIDE 1 MG/MIN: 1.8 INJECTION, SOLUTION INTRAVENOUS at 01:36

## 2022-03-06 RX ADMIN — GABAPENTIN 600 MG: 600 TABLET, FILM COATED ORAL at 17:01

## 2022-03-06 RX ADMIN — SILDENAFIL 20 MG: 20 TABLET, FILM COATED ORAL at 20:47

## 2022-03-06 RX ADMIN — LEVOTHYROXINE SODIUM 88 MCG: 0.09 TABLET ORAL at 05:04

## 2022-03-06 RX ADMIN — CYCLOBENZAPRINE 5 MG: 10 TABLET, FILM COATED ORAL at 14:03

## 2022-03-06 RX ADMIN — SERTRALINE 50 MG: 50 TABLET, FILM COATED ORAL at 20:47

## 2022-03-06 RX ADMIN — Medication 5 MG: at 20:46

## 2022-03-06 RX ADMIN — GABAPENTIN 600 MG: 600 TABLET, FILM COATED ORAL at 14:04

## 2022-03-06 RX ADMIN — PANTOPRAZOLE SODIUM 40 MG: 40 TABLET, DELAYED RELEASE ORAL at 11:21

## 2022-03-06 RX ADMIN — ENOXAPARIN SODIUM 70 MG: 100 INJECTION SUBCUTANEOUS at 05:05

## 2022-03-06 RX ADMIN — SILDENAFIL 20 MG: 20 TABLET, FILM COATED ORAL at 10:12

## 2022-03-06 RX ADMIN — ONDANSETRON HYDROCHLORIDE 4 MG: 4 TABLET, FILM COATED ORAL at 10:14

## 2022-03-06 RX ADMIN — METOPROLOL TARTRATE 25 MG: 25 TABLET, FILM COATED ORAL at 10:13

## 2022-03-06 RX ADMIN — GABAPENTIN 600 MG: 600 TABLET, FILM COATED ORAL at 20:47

## 2022-03-06 NOTE — CONSULTS
After a procedure timeout, a single lumen PICC was placed to right upper arm under sterile technique and without difficulties. Pt tolerated the procedure well. PCXR pending to verify placement. Primary nurse aware that the line is not ready to use at this time.

## 2022-03-06 NOTE — PROGRESS NOTES
[facility] Daily Progress Note    Patient Name: Catalina Moreno  : 1940  MRN: [id number]  Primary Care Physician:  Anayeli Costa APRN  Date of admission: 3/4/2022      Subjective      Chief Complaint: Palpitation    Subjective   Patient denies any chest pain, no nausea no vomiting    Review of Systems   All other systems reviewed and are negative.         Objective      Vitals:   Temp:  [96.8 °F (36 °C)-98.6 °F (37 °C)] 97.7 °F (36.5 °C)  Heart Rate:  [] 86  Resp:  [9-13] 12  BP: ()/(50-62) 116/62  Flow (L/min):  [2] 2    Physical Exam  Vitals and nursing note reviewed.   Constitutional:       General: She is not in acute distress.     Appearance: Normal appearance. She is well-developed. She is not ill-appearing, toxic-appearing or diaphoretic.   HENT:      Head: Normocephalic and atraumatic.      Right Ear: Ear canal and external ear normal.      Left Ear: Ear canal and external ear normal.      Nose: Nose normal. No congestion or rhinorrhea.      Mouth/Throat:      Mouth: Mucous membranes are moist.      Pharynx: No oropharyngeal exudate.   Eyes:      General: No scleral icterus.        Right eye: No discharge.         Left eye: No discharge.      Extraocular Movements: Extraocular movements intact.      Conjunctiva/sclera: Conjunctivae normal.      Pupils: Pupils are equal, round, and reactive to light.   Neck:      Thyroid: No thyromegaly.      Vascular: No carotid bruit or JVD.      Trachea: No tracheal deviation.   Cardiovascular:      Rate and Rhythm: Normal rate and regular rhythm.      Pulses: Normal pulses.      Heart sounds: Normal heart sounds. No murmur heard.    No friction rub. No gallop.   Pulmonary:      Effort: Pulmonary effort is normal. No respiratory distress.      Breath sounds: Normal breath sounds. No stridor. No wheezing, rhonchi or rales.   Chest:      Chest wall: No tenderness.   Abdominal:      General: Bowel sounds are  normal. There is no distension.      Palpations: Abdomen is soft. There is no mass.      Tenderness: There is no abdominal tenderness. There is no guarding or rebound.      Hernia: No hernia is present.   Musculoskeletal:         General: No swelling, tenderness, deformity or signs of injury. Normal range of motion.      Cervical back: Normal range of motion and neck supple. No rigidity. No muscular tenderness.      Right lower leg: No edema.      Left lower leg: No edema.   Lymphadenopathy:      Cervical: No cervical adenopathy.   Skin:     General: Skin is warm and dry.      Coloration: Skin is not jaundiced or pale.      Findings: No bruising, erythema or rash.   Neurological:      General: No focal deficit present.      Mental Status: She is alert and oriented to person, place, and time. Mental status is at baseline.      Cranial Nerves: No cranial nerve deficit.      Sensory: No sensory deficit.      Motor: No weakness or abnormal muscle tone.      Coordination: Coordination normal.   Psychiatric:         Mood and Affect: Mood normal.         Behavior: Behavior normal.         Thought Content: Thought content normal.         Judgment: Judgment normal.             Result Review    Result Review:  I have personally reviewed the results from the time of this admission to 3/6/2022 15:51 EST and agree with these findings:  [x]  Laboratory  [x]  Microbiology  [x]  Radiology  []  EKG/Telemetry   []  Cardiology/Vascular   []  Pathology  []  Old records  []  Other:  Most notable findings include:         Assessment/Plan      Brief Patient Summary:  Catalina Moreno is a 81 y.o. female who       atorvastatin, 10 mg, Oral, Nightly  digoxin, 125 mcg, Oral, Daily  enoxaparin, 1 mg/kg, Subcutaneous, Q12H  famotidine, 20 mg, Oral, Daily  furosemide, 40 mg, Intravenous, Daily  gabapentin, 600 mg, Oral, 4x Daily  levothyroxine, 88 mcg, Oral, Q AM  metoprolol tartrate, 25 mg, Oral, Q12H  pantoprazole, 40 mg, Oral, Daily With  Lunch  potassium chloride, 40 mEq, Oral, Daily  rOPINIRole, 0.25 mg, Oral, Nightly  sertraline, 50 mg, Oral, Nightly  sildenafil, 20 mg, Oral, BID  sodium chloride, 10 mL, Intravenous, Q12H  sodium chloride, 10 mL, Intravenous, Q12H  sodium chloride, 10 mL, Intravenous, Q12H  cyanocobalamin, 1,000 mcg, Oral, Daily       amiodarone, 1 mg/min, Last Rate: Stopped (03/06/22 1106)         Active Hospital Problems:  Active Hospital Problems    Diagnosis    • **Atrial fibrillation with RVR (HCC)    • Pulmonary hypertension, unspecified (HCC)    • Restless legs syndrome    • Hypothyroidism, unspecified    • Stage 2 chronic kidney disease    • Acute on chronic diastolic congestive heart failure (HCC)    • Lumbar radiculopathy    • Primary fibromyalgia syndrome      Plan:     A. fib with RVR  -EKG shows A. fib with RVR at a rate of 158 without obvious acute ST changes and a QTC of 495 ms  -Continue iv amiodarone, changed to oral F12 cardiology recommendation  - therapeutic dose lovenox sc  -Continue beta-blocker and digoxin, hold p.o. Cardizem while on infusion  -TSH: 3.530  -Troponin less than 0.010, trend  -Continue telemetry  -Lovenox twice daily for now, consider alternative p.o. anticoagulation or Case management consult given patient's inability to afford Eliquis on an outpatient basis, patient likely will need Coumadin  - consulted cardiology service.     Acute on chronic heart failure systolic in nature, 2D echo revealed ejection fraction around 40 to 45%  -proBNP: 8657.0 with pulmonary edema noted on CT of chest as well as CT of abdomen  -Echocardiogram from 7/26/2021 showed an EF of 60% with normal left ventricular systolic function with moderate aortic valve regurgitation  -2D echo finding reviewed revealing ejection fraction around 40 to 45%  -40 mg Lasix IV daily, can change to p.o. in a day or 2  -2 g sodium diet  -Daily weight  -Continue beta-blocker     Anemia  -Hemoglobin: 11.1 with a decreased MCV  -Check  iron profile  -Monitor while admitted     History of CKD stage II  -Serum creatinine: 0.76 with an EGFR of 79.3  -Monitor while admitted     Hypothyroidism  -Levothyroxine     RLS  -Requip     Chronic pain/fibromyalgia  -Norco and Lyrica     Depression/anxiety  -Zoloft     DVT prophylaxis:  Medical DVT prophylaxis orders are present.     CODE STATUS:    Admission Status:  I believe this patient meets observation status.    DVT prophylaxis:  Medical DVT prophylaxis orders are present.    CODE STATUS:    Code Status (Patient has no pulse and is not breathing): CPR (Attempt to Resuscitate)  Medical Interventions (Patient has pulse or is breathing): Full Support      Disposition:  I expect patient to be discharged as per clinical course.    This patient has been examined wearing appropriate Personal Protective Equipment and discussed with hospital infection control department. 03/06/22      Electronically signed by Bryce Hastings MD, 03/06/22, 15:51 EST.  [facility] Hospitalist Team

## 2022-03-06 NOTE — CONSULTS
Nutrition Services    Patient Name: Catalina Moreno  YOB: 1940  MRN: [id number]  Admission date: 3/4/2022    Continue 2g sodium diet as tolerated.     Will also start Boost Plus BID (Provides 720 kcals, 28 g protein if consumed)       PPE Documentation        PPE Worn By Provider mask, gloves and eye protection   PPE Worn By Patient  None      CLINICAL NUTRITION ASSESSMENT      Reason for Assessment 3/6: MST score 2+     H&P      Past Medical History:   Diagnosis Date   • Anxiety    • Arthritis    • Atrial fibrillation (HCC)     paroxysmal   • Broken shoulder     left-- due to fall 11-7-19 was at [facility]   • Buttock pain     rt side   • DDD (degenerative disc disease), lumbar    • Depression    • Edema     9/2020 foot   • GERD (gastroesophageal reflux disease)    • H/O fall    • Hip pain     rt   • Hypertension    • Hypothyroidism    • Insomnia    • Leg pain     rt   • Low back pain    • Neuropathy    • PONV (postoperative nausea and vomiting)    • Pulmonary hypertension (HCC)    • Radiculopathy    • Restless legs    • Spondylolisthesis    • Urinary incontinence        Past Surgical History:   Procedure Laterality Date   • BACK SURGERY  07/19/2018    PDC &  PSF L3-L5 insitu   • CARDIAC CATHETERIZATION N/A 4/2/2021    Procedure: Cardiac Catheterization/Vascular Study;  Surgeon: Barrett Evans MD;  Location: [facility] CATH INVASIVE LOCATION;  Service: Cardiovascular;  Laterality: N/A;   • CHOLECYSTECTOMY     • COLONOSCOPY     • HYSTERECTOMY     • ROTATOR CUFF REPAIR Left         Current Problems   A. fib with RVR  -cardiology following     Acute on chronic heart failure systolic in nature, 2D echo revealed ejection fraction around 40 to 45%  -2 g sodium diet  -Daily weight  -Continues on beta-blocker     Anemia  -Hemoglobin: 11.1 with a decreased MCV        Encounter Information        Trending Narrative     3/6: Pt assessed due to MST score 2+. Upon visit, pt states she has been feeling very  "poorly with associated decreased appetite, but has noticed her appetite is beginning to improve drastically compared to previous days. Tolerating foods without N/V; very complimentary of food services. NFPE completed and not consistent with nutrition diagnosis of malnutrition at this time using AND/ASPEN criteria. Denies any chewing/swallowing problems, but does endorse occasionally difficulty with large pills -- encouraged pt to talk with pharmacist/MD about options for other medical formats. Pt agreeable.     Anthropometrics        Current Height, Weight Height: 165.1 cm (65\")  Weight: 74.4 kg (164 lb 1.6 oz) (03/06/22 0620)       Ideal Body Weight (IBW) 125 lb    Usual Body Weight (UBW) 160 lb       Trending Weight Hx     This admission: 3/6: Scale weights ~70-71 kg; today's weight higher -- will monitor              PTA: 3/6: 6.8% loss in 6 months -- not significant for timeframe; will monitor     Wt Readings from Last 30 Encounters:   03/06/22 0620 74.4 kg (164 lb 1.6 oz)   03/05/22 0756 69.9 kg (154 lb)   03/04/22 1840 70.2 kg (154 lb 11.2 oz)   03/04/22 1217 71.7 kg (158 lb)   02/08/22 1124 71.7 kg (158 lb)   01/26/22 1308 71.7 kg (158 lb)   12/31/21 1255 78.5 kg (173 lb)   12/16/21 1416 71.7 kg (158 lb)   12/02/21 1054 71.7 kg (158 lb)   11/17/21 1247 71.7 kg (158 lb 2 oz)   11/16/21 0625 77.4 kg (170 lb 10.2 oz)   11/15/21 0605 79.4 kg (175 lb 0.7 oz)   11/13/21 1718 79.4 kg (175 lb)   09/02/21 1105 79.8 kg (176 lb)   07/27/21 0532 79.9 kg (176 lb 2.4 oz)   07/26/21 1544 78 kg (172 lb)   07/26/21 0545 78.3 kg (172 lb 9.9 oz)   07/25/21 2145 77 kg (169 lb 12.1 oz)   07/25/21 1245 72.6 kg (160 lb)   06/20/21 1415 81.2 kg (179 lb)   05/18/21 1337 82.6 kg (182 lb)   04/11/21 1901 82.6 kg (182 lb)   04/03/21 0348 81.1 kg (178 lb 12.7 oz)   04/02/21 0346 81.2 kg (179 lb 0.2 oz)   04/01/21 0607 81.3 kg (179 lb 3.7 oz)   03/31/21 2210 81.5 kg (179 lb 9.6 oz)   03/31/21 1909 81.6 kg (180 lb)   03/22/21 1423 81.6 kg " (180 lb)   12/01/20 0927 81.6 kg (180 lb)   11/02/20 1847 81.6 kg (180 lb)   10/08/20 1501 84.4 kg (186 lb)   10/01/20 1452 81.6 kg (180 lb)   10/01/20 1021 81.6 kg (180 lb)   09/04/20 1050 81.6 kg (180 lb)   08/17/20 1007 83.5 kg (184 lb)   08/06/20 1328 81.6 kg (180 lb)   06/26/20 0926 72.1 kg (159 lb)   06/09/20 0849 70.3 kg (155 lb)   05/28/20 1353 76.2 kg (168 lb)   04/13/20 1339 68 kg (150 lb)   04/09/20 0510 68 kg (150 lb)   02/04/20 1311 71.7 kg (158 lb)   01/23/20 1344 73.5 kg (162 lb)      BMI kg/m2 Body mass index is 27.31 kg/m².       Labs        Pertinent Labs    Results from last 7 days   Lab Units 03/06/22 0229 03/04/22 2320 03/04/22  1309   SODIUM mmol/L 136 137 137   POTASSIUM mmol/L 3.8 3.2* 4.1   CHLORIDE mmol/L 97* 95* 99   CO2 mmol/L 24.0 25.0 22.0   BUN mg/dL 20 10 9   CREATININE mg/dL 1.05* 0.85 0.76   CALCIUM mg/dL 8.9 9.4 9.6   BILIRUBIN mg/dL  --   --  1.7*   ALK PHOS U/L  --   --  114   ALT (SGPT) U/L  --   --  7   AST (SGOT) U/L  --   --  18   GLUCOSE mg/dL 105* 142* 136*     Results from last 7 days   Lab Units 03/06/22 0229 03/04/22 2320 03/04/22  1309   MAGNESIUM mg/dL 2.4 1.8 1.8   HEMOGLOBIN g/dL 10.8* 10.7* 11.1*   HEMATOCRIT % 33.4* 33.9* 34.1     COVID19   Date Value Ref Range Status   03/04/2022 Not Detected Not Detected - Ref. Range Final     Lab Results   Component Value Date    HGBA1C 6.0 (H) 11/13/2021        Medications    Scheduled Medications atorvastatin, 10 mg, Oral, Nightly  digoxin, 125 mcg, Oral, Daily  enoxaparin, 1 mg/kg, Subcutaneous, Q12H  famotidine, 20 mg, Oral, Daily  furosemide, 40 mg, Intravenous, Daily  gabapentin, 600 mg, Oral, 4x Daily  levothyroxine, 88 mcg, Oral, Q AM  metoprolol tartrate, 25 mg, Oral, Q12H  pantoprazole, 40 mg, Oral, Daily With Lunch  potassium chloride, 40 mEq, Oral, Daily  rOPINIRole, 0.25 mg, Oral, Nightly  sertraline, 50 mg, Oral, Nightly  sildenafil, 20 mg, Oral, BID  sodium chloride, 10 mL, Intravenous, Q12H  sodium chloride,  10 mL, Intravenous, Q12H  sodium chloride, 10 mL, Intravenous, Q12H  cyanocobalamin, 1,000 mcg, Oral, Daily        Infusions amiodarone, 1 mg/min, Last Rate: 1 mg/min (03/06/22 1643)        PRN Medications cyclobenzaprine  •  HYDROcodone-acetaminophen  •  [START ON 3/8/2022] HYDROcodone-acetaminophen  •  influenza vaccine  •  melatonin  •  ondansetron **OR** ondansetron  •  sodium chloride  •  sodium chloride  •  sodium chloride  •  sodium chloride  •  sodium chloride     Physical Findings        Trending Physical   Appearance, NFPE NFPE completed and not consistent with nutrition diagnosis of malnutrition at this time using AND/ASPEN criteria      --  Edema  None documented      Bowel Function BM 3/3     Tubes No feeding tube in place      Chewing/Swallowing Denies difficulty      Skin No breakdown documented      --  Current Nutrition Orders & Evaluation of Intake       Oral Nutrition     Food Allergies NKFA   Current PO Diet Diet Cardiac; 2gm Na+   Supplement None ordered   PO Evaluation     Trending % PO Intake ~50% at recent meals    --  Nutritional Risk Screening        NRS-2002 Score          Nutrition Diagnosis         Nutrition Dx Problem 1 Inadequate oral intake R/t decreased appetite in the setting of acute illness; as evidenced by pt report, and intakes averaging ~50%.       Nutrition Dx Problem 2        Intervention Goal         Intervention Goal(s) Intake improving toward 75% or better at meals      Nutrition Intervention        RD Action Will start Boost Plus BID (Provides 720 kcals, 28 g protein if consumed)   Pt agreeable - has used these at home      Nutrition Prescription          Diet Prescription 2g sodium    Supplement Prescription Boost Plus BID (Provides 720 kcals, 28 g protein if consumed)      --  Monitor/Evaluation        Monitor I&O, PO intake, Supplement intake, Pertinent labs, Weight, Skin status, GI status, POC/GOC     Electronically signed by:  Marine Early RD  03/06/22

## 2022-03-06 NOTE — PLAN OF CARE
Goal Outcome Evaluation:                       Problem: Adult Inpatient Plan of Care  Goal: Absence of Hospital-Acquired Illness or Injury  Intervention: Identify and Manage Fall Risk  Recent Flowsheet Documentation  Taken 3/6/2022 1600 by Edy Tripp RN  Safety Promotion/Fall Prevention:   safety round/check completed   room organization consistent   nonskid shoes/slippers when out of bed   lighting adjusted   fall prevention program maintained   clutter free environment maintained   assistive device/personal items within reach   activity supervised  Taken 3/6/2022 1400 by Edy Tripp RN  Safety Promotion/Fall Prevention:   safety round/check completed   room organization consistent   nonskid shoes/slippers when out of bed   fall prevention program maintained   clutter free environment maintained   assistive device/personal items within reach  Taken 3/6/2022 1200 by Edy Tripp RN  Safety Promotion/Fall Prevention:   safety round/check completed   room organization consistent   nonskid shoes/slippers when out of bed   lighting adjusted   fall prevention program maintained   clutter free environment maintained   activity supervised   assistive device/personal items within reach  Taken 3/6/2022 1010 by Edy Tripp RN  Safety Promotion/Fall Prevention:   safety round/check completed   room organization consistent   nonskid shoes/slippers when out of bed   fall prevention program maintained   clutter free environment maintained   assistive device/personal items within reach  Taken 3/6/2022 0800 by Edy Tripp RN  Safety Promotion/Fall Prevention:   safety round/check completed   room organization consistent   nonskid shoes/slippers when out of bed   lighting adjusted   fall prevention program maintained   clutter free environment maintained   assistive device/personal items within reach   activity supervised  Intervention: Prevent Skin Injury  Recent Flowsheet Documentation  Taken 3/6/2022  1600 by Edy Tripp RN  Skin Protection:   adhesive use limited   incontinence pads utilized   skin-to-device areas padded   tubing/devices free from skin contact  Taken 3/6/2022 1200 by Edy Tripp RN  Skin Protection:   adhesive use limited   incontinence pads utilized   skin-to-device areas padded   tubing/devices free from skin contact  Taken 3/6/2022 0800 by Edy Tripp RN  Skin Protection:   adhesive use limited   incontinence pads utilized   skin-to-device areas padded   tubing/devices free from skin contact  Intervention: Prevent and Manage VTE (venous thromboembolism) Risk  Recent Flowsheet Documentation  Taken 3/6/2022 1600 by Edy Tripp RN  VTE Prevention/Management:   bilateral   sequential compression devices off   other (see comments)  Taken 3/6/2022 1200 by Edy Tripp RN  VTE Prevention/Management:   bilateral   sequential compression devices off   other (see comments)  Taken 3/6/2022 0800 by Edy Tripp RN  VTE Prevention/Management: (Lovenox)   bilateral   sequential compression devices off   other (see comments)  Intervention: Prevent Infection  Recent Flowsheet Documentation  Taken 3/6/2022 1600 by Edy Tripp RN  Infection Prevention:   hand hygiene promoted   personal protective equipment utilized   rest/sleep promoted  Taken 3/6/2022 1400 by Edy Tripp RN  Infection Prevention:   hand hygiene promoted   personal protective equipment utilized   rest/sleep promoted  Taken 3/6/2022 1200 by Edy Tripp RN  Infection Prevention:   personal protective equipment utilized   hand hygiene promoted   rest/sleep promoted  Taken 3/6/2022 1010 by Edy Tripp RN  Infection Prevention:   hand hygiene promoted   personal protective equipment utilized   rest/sleep promoted  Taken 3/6/2022 0800 by Edy Tripp RN  Infection Prevention:   hand hygiene promoted   personal protective equipment utilized   rest/sleep promoted  Goal: Optimal Comfort  and Wellbeing  Intervention: Provide Person-Centered Care  Recent Flowsheet Documentation  Taken 3/6/2022 1600 by Edy Tripp RN  Trust Relationship/Rapport:   reassurance provided   thoughts/feelings acknowledged  Taken 3/6/2022 1200 by Edy Tripp RN  Trust Relationship/Rapport:   reassurance provided   thoughts/feelings acknowledged   care explained   choices provided   questions encouraged   questions answered   empathic listening provided   emotional support provided  Taken 3/6/2022 0800 by Edy Tripp RN  Trust Relationship/Rapport:   reassurance provided   thoughts/feelings acknowledged     Problem: Adult Inpatient Plan of Care  Goal: Plan of Care Review  Outcome: Ongoing, Progressing  Goal: Patient-Specific Goal (Individualized)  Outcome: Ongoing, Progressing  Goal: Absence of Hospital-Acquired Illness or Injury  Outcome: Ongoing, Progressing  Intervention: Identify and Manage Fall Risk  Recent Flowsheet Documentation  Taken 3/6/2022 1600 by Edy Tripp RN  Safety Promotion/Fall Prevention:   safety round/check completed   room organization consistent   nonskid shoes/slippers when out of bed   lighting adjusted   fall prevention program maintained   clutter free environment maintained   assistive device/personal items within reach   activity supervised  Taken 3/6/2022 1400 by Edy Tripp RN  Safety Promotion/Fall Prevention:   safety round/check completed   room organization consistent   nonskid shoes/slippers when out of bed   fall prevention program maintained   clutter free environment maintained   assistive device/personal items within reach  Taken 3/6/2022 1200 by Edy Tripp RN  Safety Promotion/Fall Prevention:   safety round/check completed   room organization consistent   nonskid shoes/slippers when out of bed   lighting adjusted   fall prevention program maintained   clutter free environment maintained   activity supervised   assistive device/personal items  within reach  Taken 3/6/2022 1010 by Edy Tripp RN  Safety Promotion/Fall Prevention:   safety round/check completed   room organization consistent   nonskid shoes/slippers when out of bed   fall prevention program maintained   clutter free environment maintained   assistive device/personal items within reach  Taken 3/6/2022 0800 by Edy Tripp RN  Safety Promotion/Fall Prevention:   safety round/check completed   room organization consistent   nonskid shoes/slippers when out of bed   lighting adjusted   fall prevention program maintained   clutter free environment maintained   assistive device/personal items within reach   activity supervised  Intervention: Prevent Skin Injury  Recent Flowsheet Documentation  Taken 3/6/2022 1600 by Edy Tripp RN  Skin Protection:   adhesive use limited   incontinence pads utilized   skin-to-device areas padded   tubing/devices free from skin contact  Taken 3/6/2022 1200 by Edy Tripp RN  Skin Protection:   adhesive use limited   incontinence pads utilized   skin-to-device areas padded   tubing/devices free from skin contact  Taken 3/6/2022 0800 by Edy Tripp RN  Skin Protection:   adhesive use limited   incontinence pads utilized   skin-to-device areas padded   tubing/devices free from skin contact  Intervention: Prevent and Manage VTE (Venous Thromboembolism) Risk  Recent Flowsheet Documentation  Taken 3/6/2022 1600 by Edy Tripp RN  VTE Prevention/Management:   bilateral   sequential compression devices off   other (see comments)  Taken 3/6/2022 1200 by Edy Tripp RN  VTE Prevention/Management:   bilateral   sequential compression devices off   other (see comments)  Taken 3/6/2022 0800 by Edy Tripp RN  VTE Prevention/Management: (Lovenox)   bilateral   sequential compression devices off   other (see comments)  Intervention: Prevent Infection  Recent Flowsheet Documentation  Taken 3/6/2022 1600 by Eyd Tripp  RN  Infection Prevention:   hand hygiene promoted   personal protective equipment utilized   rest/sleep promoted  Taken 3/6/2022 1400 by Edy Tripp RN  Infection Prevention:   hand hygiene promoted   personal protective equipment utilized   rest/sleep promoted  Taken 3/6/2022 1200 by Edy Tripp RN  Infection Prevention:   personal protective equipment utilized   hand hygiene promoted   rest/sleep promoted  Taken 3/6/2022 1010 by Edy Tripp RN  Infection Prevention:   hand hygiene promoted   personal protective equipment utilized   rest/sleep promoted  Taken 3/6/2022 0800 by Edy Tripp RN  Infection Prevention:   hand hygiene promoted   personal protective equipment utilized   rest/sleep promoted  Goal: Optimal Comfort and Wellbeing  Outcome: Ongoing, Progressing  Intervention: Provide Person-Centered Care  Recent Flowsheet Documentation  Taken 3/6/2022 1600 by Edy Tripp RN  Trust Relationship/Rapport:   reassurance provided   thoughts/feelings acknowledged  Taken 3/6/2022 1200 by Edy Tripp RN  Trust Relationship/Rapport:   reassurance provided   thoughts/feelings acknowledged   care explained   choices provided   questions encouraged   questions answered   empathic listening provided   emotional support provided  Taken 3/6/2022 0800 by Edy Tripp RN  Trust Relationship/Rapport:   reassurance provided   thoughts/feelings acknowledged  Goal: Readiness for Transition of Care  Outcome: Ongoing, Progressing     Problem: Heart Failure Comorbidity  Goal: Maintenance of Heart Failure Symptom Control  Intervention: Maintain Heart Failure-Management Strategies  Recent Flowsheet Documentation  Taken 3/6/2022 1600 by Edy Tripp RN  Medication Review/Management: medications reviewed  Taken 3/6/2022 1400 by Edy Tripp RN  Medication Review/Management: medications reviewed  Taken 3/6/2022 1200 by Edy Tripp RN  Medication Review/Management: medications  reviewed  Taken 3/6/2022 1010 by Edy Tripp RN  Medication Review/Management: medications reviewed  Taken 3/6/2022 0800 by Edy Tripp RN  Medication Review/Management: medications reviewed     Problem: Hypertension Comorbidity  Goal: Blood Pressure in Desired Range  Intervention: Maintain Hypertension-Management Strategies  Recent Flowsheet Documentation  Taken 3/6/2022 1600 by Edy Tripp RN  Syncope Management: position changed slowly  Medication Review/Management: medications reviewed  Taken 3/6/2022 1400 by Edy Tripp RN  Medication Review/Management: medications reviewed  Taken 3/6/2022 1200 by Edy Tripp RN  Syncope Management: position changed slowly  Medication Review/Management: medications reviewed  Taken 3/6/2022 1010 by Edy Tripp RN  Medication Review/Management: medications reviewed  Taken 3/6/2022 0800 by Edy Tripp RN  Syncope Management: position changed slowly  Medication Review/Management: medications reviewed     Problem: Asthma Comorbidity  Goal: Maintenance of Asthma Control  Outcome: Ongoing, Progressing  Intervention: Maintain Asthma Symptom Control  Recent Flowsheet Documentation  Taken 3/6/2022 1600 by Edy Tripp RN  Medication Review/Management: medications reviewed  Taken 3/6/2022 1400 by Edy Tripp RN  Medication Review/Management: medications reviewed  Taken 3/6/2022 1200 by Edy Tripp RN  Medication Review/Management: medications reviewed  Taken 3/6/2022 1010 by Edy Tripp RN  Medication Review/Management: medications reviewed  Taken 3/6/2022 0800 by Edy Tripp RN  Medication Review/Management: medications reviewed     Problem: Hypertension Comorbidity  Goal: Blood Pressure in Desired Range  Outcome: Ongoing, Progressing  Intervention: Maintain Blood Pressure Management  Recent Flowsheet Documentation  Taken 3/6/2022 1600 by Edy Tripp RN  Syncope Management: position changed slowly  Medication  Review/Management: medications reviewed  Taken 3/6/2022 1400 by Edy Tripp RN  Medication Review/Management: medications reviewed  Taken 3/6/2022 1200 by Edy Tripp RN  Syncope Management: position changed slowly  Medication Review/Management: medications reviewed  Taken 3/6/2022 1010 by Edy Tripp RN  Medication Review/Management: medications reviewed  Taken 3/6/2022 0800 by Edy Tripp RN  Syncope Management: position changed slowly  Medication Review/Management: medications reviewed     Problem: Skin Injury Risk Increased  Goal: Skin Health and Integrity  Intervention: Optimize Skin Protection  Recent Flowsheet Documentation  Taken 3/6/2022 1600 by Edy Tripp RN  Pressure Reduction Techniques:   frequent weight shift encouraged   positioned off wounds   pressure points protected   weight shift assistance provided  Pressure Reduction Devices:   positioning supports utilized   pressure-redistributing mattress utilized  Skin Protection:   adhesive use limited   incontinence pads utilized   skin-to-device areas padded   tubing/devices free from skin contact  Taken 3/6/2022 1200 by Edy Tripp RN  Pressure Reduction Techniques:   frequent weight shift encouraged   positioned off wounds   pressure points protected   weight shift assistance provided  Pressure Reduction Devices:   positioning supports utilized   pressure-redistributing mattress utilized  Skin Protection:   adhesive use limited   incontinence pads utilized   skin-to-device areas padded   tubing/devices free from skin contact  Taken 3/6/2022 0800 by Edy Tripp RN  Pressure Reduction Techniques:   frequent weight shift encouraged   positioned off wounds   pressure points protected   weight shift assistance provided  Pressure Reduction Devices:   positioning supports utilized   pressure-redistributing mattress utilized  Skin Protection:   adhesive use limited   incontinence pads utilized   skin-to-device areas  padded   tubing/devices free from skin contact     Problem: Skin Injury Risk Increased  Goal: Skin Health and Integrity  Outcome: Ongoing, Progressing  Intervention: Optimize Skin Protection  Recent Flowsheet Documentation  Taken 3/6/2022 1600 by Edy Tripp RN  Pressure Reduction Techniques:   frequent weight shift encouraged   positioned off wounds   pressure points protected   weight shift assistance provided  Pressure Reduction Devices:   positioning supports utilized   pressure-redistributing mattress utilized  Skin Protection:   adhesive use limited   incontinence pads utilized   skin-to-device areas padded   tubing/devices free from skin contact  Taken 3/6/2022 1200 by Edy Tripp RN  Pressure Reduction Techniques:   frequent weight shift encouraged   positioned off wounds   pressure points protected   weight shift assistance provided  Pressure Reduction Devices:   positioning supports utilized   pressure-redistributing mattress utilized  Skin Protection:   adhesive use limited   incontinence pads utilized   skin-to-device areas padded   tubing/devices free from skin contact  Taken 3/6/2022 0800 by Edy Tripp RN  Pressure Reduction Techniques:   frequent weight shift encouraged   positioned off wounds   pressure points protected   weight shift assistance provided  Pressure Reduction Devices:   positioning supports utilized   pressure-redistributing mattress utilized  Skin Protection:   adhesive use limited   incontinence pads utilized   skin-to-device areas padded   tubing/devices free from skin contact     Problem: Fall Injury Risk  Goal: Absence of Fall and Fall-Related Injury  Intervention: Identify and Manage Contributors to Fall Injury Risk  Recent Flowsheet Documentation  Taken 3/6/2022 1600 by Edy Tripp RN  Medication Review/Management: medications reviewed  Taken 3/6/2022 1400 by Edy Tripp RN  Medication Review/Management: medications reviewed  Taken 3/6/2022 1200 by  Edy Tripp RN  Medication Review/Management: medications reviewed  Taken 3/6/2022 1010 by Edy Tripp RN  Medication Review/Management: medications reviewed  Taken 3/6/2022 0800 by Edy Tripp RN  Medication Review/Management: medications reviewed  Intervention: Promote Injury-Free Environment  Recent Flowsheet Documentation  Taken 3/6/2022 1600 by Edy Tripp RN  Safety Promotion/Fall Prevention:   safety round/check completed   room organization consistent   nonskid shoes/slippers when out of bed   lighting adjusted   fall prevention program maintained   clutter free environment maintained   assistive device/personal items within reach   activity supervised  Taken 3/6/2022 1400 by Edy Tripp RN  Safety Promotion/Fall Prevention:   safety round/check completed   room organization consistent   nonskid shoes/slippers when out of bed   fall prevention program maintained   clutter free environment maintained   assistive device/personal items within reach  Taken 3/6/2022 1200 by Edy Tripp RN  Safety Promotion/Fall Prevention:   safety round/check completed   room organization consistent   nonskid shoes/slippers when out of bed   lighting adjusted   fall prevention program maintained   clutter free environment maintained   activity supervised   assistive device/personal items within reach  Taken 3/6/2022 1010 by Edy Tripp RN  Safety Promotion/Fall Prevention:   safety round/check completed   room organization consistent   nonskid shoes/slippers when out of bed   fall prevention program maintained   clutter free environment maintained   assistive device/personal items within reach  Taken 3/6/2022 0800 by Edy Tripp RN  Safety Promotion/Fall Prevention:   safety round/check completed   room organization consistent   nonskid shoes/slippers when out of bed   lighting adjusted   fall prevention program maintained   clutter free environment maintained   assistive  device/personal items within reach   activity supervised     Problem: Fall Injury Risk  Goal: Absence of Fall and Fall-Related Injury  Outcome: Ongoing, Progressing  Intervention: Identify and Manage Contributors  Recent Flowsheet Documentation  Taken 3/6/2022 1600 by Edy Tripp RN  Medication Review/Management: medications reviewed  Taken 3/6/2022 1400 by Edy Tripp RN  Medication Review/Management: medications reviewed  Taken 3/6/2022 1200 by Edy Tripp RN  Medication Review/Management: medications reviewed  Taken 3/6/2022 1010 by Edy Tripp RN  Medication Review/Management: medications reviewed  Taken 3/6/2022 0800 by Edy Tripp RN  Medication Review/Management: medications reviewed  Intervention: Promote Injury-Free Environment  Recent Flowsheet Documentation  Taken 3/6/2022 1600 by Edy Tripp RN  Safety Promotion/Fall Prevention:   safety round/check completed   room organization consistent   nonskid shoes/slippers when out of bed   lighting adjusted   fall prevention program maintained   clutter free environment maintained   assistive device/personal items within reach   activity supervised  Taken 3/6/2022 1400 by Edy Tripp RN  Safety Promotion/Fall Prevention:   safety round/check completed   room organization consistent   nonskid shoes/slippers when out of bed   fall prevention program maintained   clutter free environment maintained   assistive device/personal items within reach  Taken 3/6/2022 1200 by Edy Tripp RN  Safety Promotion/Fall Prevention:   safety round/check completed   room organization consistent   nonskid shoes/slippers when out of bed   lighting adjusted   fall prevention program maintained   clutter free environment maintained   activity supervised   assistive device/personal items within reach  Taken 3/6/2022 1010 by Edy Tripp RN  Safety Promotion/Fall Prevention:   safety round/check completed   room organization  consistent   nonskid shoes/slippers when out of bed   fall prevention program maintained   clutter free environment maintained   assistive device/personal items within reach  Taken 3/6/2022 0800 by Edy Tripp RN  Safety Promotion/Fall Prevention:   safety round/check completed   room organization consistent   nonskid shoes/slippers when out of bed   lighting adjusted   fall prevention program maintained   clutter free environment maintained   assistive device/personal items within reach   activity supervised     Problem: Pain Acute  Goal: Acceptable Pain Control and Functional Ability  Outcome: Ongoing, Progressing  Intervention: Prevent or Manage Pain  Recent Flowsheet Documentation  Taken 3/6/2022 1600 by Edy Tripp RN  Sensory Stimulation Regulation:   auditory stimulation minimized   care clustered   lighting decreased  Bowel Elimination Promotion: commode/bedpan at bedside  Medication Review/Management: medications reviewed  Taken 3/6/2022 1400 by Edy Tripp RN  Medication Review/Management: medications reviewed  Taken 3/6/2022 1200 by Edy Tripp RN  Sensory Stimulation Regulation:   auditory stimulation minimized   care clustered   lighting decreased  Bowel Elimination Promotion: commode/bedpan at bedside  Medication Review/Management: medications reviewed  Taken 3/6/2022 1010 by Edy Tripp RN  Medication Review/Management: medications reviewed  Taken 3/6/2022 0800 by Edy Tripp RN  Sensory Stimulation Regulation:   auditory stimulation minimized   care clustered   lighting decreased  Bowel Elimination Promotion: commode/bedpan at bedside  Medication Review/Management: medications reviewed  Intervention: Optimize Psychosocial Wellbeing  Recent Flowsheet Documentation  Taken 3/6/2022 1600 by Edy Tripp RN  Supportive Measures: active listening utilized  Diversional Activities: television  Taken 3/6/2022 1200 by Edy Tripp RN  Supportive Measures: active  listening utilized  Taken 3/6/2022 0800 by Edy Tripp RN  Supportive Measures: active listening utilized  Goal: Acceptable Pain Control and Functional Ability  Outcome: Ongoing, Progressing  Intervention: Prevent or Manage Pain  Recent Flowsheet Documentation  Taken 3/6/2022 1600 by Edy Tripp RN  Sensory Stimulation Regulation:   auditory stimulation minimized   care clustered   lighting decreased  Bowel Elimination Promotion: commode/bedpan at bedside  Medication Review/Management: medications reviewed  Taken 3/6/2022 1400 by Edy Tripp RN  Medication Review/Management: medications reviewed  Taken 3/6/2022 1200 by Edy Tripp RN  Sensory Stimulation Regulation:   auditory stimulation minimized   care clustered   lighting decreased  Bowel Elimination Promotion: commode/bedpan at bedside  Medication Review/Management: medications reviewed  Taken 3/6/2022 1010 by Edy Tripp RN  Medication Review/Management: medications reviewed  Taken 3/6/2022 0800 by Edy Tripp RN  Sensory Stimulation Regulation:   auditory stimulation minimized   care clustered   lighting decreased  Bowel Elimination Promotion: commode/bedpan at bedside  Medication Review/Management: medications reviewed  Intervention: Optimize Psychosocial Wellbeing  Recent Flowsheet Documentation  Taken 3/6/2022 1600 by Edy Tripp RN  Supportive Measures: active listening utilized  Diversional Activities: television  Taken 3/6/2022 1200 by Edy Tripp RN  Supportive Measures: active listening utilized  Taken 3/6/2022 0800 by Edy Tripp RN  Supportive Measures: active listening utilized     Problem: Dysrhythmia  Goal: Normalized Cardiac Rhythm  Outcome: Ongoing, Progressing  Intervention: Monitor and Manage Cardiac Rhythm Effect  Recent Flowsheet Documentation  Taken 3/6/2022 1600 by Edy Tripp RN  VTE Prevention/Management:   bilateral   sequential compression devices off   other (see  comments)  Taken 3/6/2022 1200 by Edy Tripp, RN  VTE Prevention/Management:   bilateral   sequential compression devices off   other (see comments)  Taken 3/6/2022 0800 by Edy Tripp RN  VTE Prevention/Management: (Lovenox)   bilateral   sequential compression devices off   other (see comments)

## 2022-03-06 NOTE — PLAN OF CARE
Goal Outcome Evaluation:                   80 y/o F admitted for afib w/ rvr. Full code. Pt on Amio gtt @ 1. Cardiology rebolused the patient this afternoon and restarted the Amio back up after it was stopped overnight. BP runs low SBP- 100s-90s. Cardiology is aware- they still wanted the Pt on the Amio. Pt c/o back pain- Norco 7.5 mg Q 6 hrs ordered. Pt had K+, Mg replaced this shift- labs to be drawn overnight. HR is tachy @ times when Pt is awake. Pt has dog @ home and she is worried because she states she wasn't expecting to stay this long. States her son needs to be notified to go check on her dog. Pt is A/O, takes pills whole. 2 L NC. Assist 1-2 to BSC. Pt has BP cuff on and timed Bps d/t Amio gtt. Will cont to monitor.     Problem: Adult Inpatient Plan of Care  Goal: Absence of Hospital-Acquired Illness or Injury  Intervention: Identify and Manage Fall Risk  Recent Flowsheet Documentation  Taken 3/5/2022 1400 by Edy Tripp RN  Safety Promotion/Fall Prevention:   safety round/check completed   room organization consistent   nonskid shoes/slippers when out of bed   fall prevention program maintained   clutter free environment maintained   assistive device/personal items within reach  Taken 3/5/2022 1200 by Edy Tripp RN  Safety Promotion/Fall Prevention:   safety round/check completed   room organization consistent   nonskid shoes/slippers when out of bed   lighting adjusted   fall prevention program maintained   clutter free environment maintained   assistive device/personal items within reach   activity supervised  Taken 3/5/2022 1000 by Edy Tripp RN  Safety Promotion/Fall Prevention:   safety round/check completed   room organization consistent   nonskid shoes/slippers when out of bed   fall prevention program maintained   clutter free environment maintained   assistive device/personal items within reach  Taken 3/5/2022 0800 by Edy Tripp RN  Safety Promotion/Fall Prevention:    safety round/check completed   room organization consistent   nonskid shoes/slippers when out of bed   lighting adjusted   fall prevention program maintained   clutter free environment maintained   assistive device/personal items within reach   activity supervised  Intervention: Prevent Skin Injury  Recent Flowsheet Documentation  Taken 3/5/2022 1200 by Edy Tripp RN  Skin Protection:   adhesive use limited   incontinence pads utilized   skin-to-device areas padded   tubing/devices free from skin contact  Taken 3/5/2022 0800 by Edy Tripp RN  Skin Protection:   adhesive use limited   incontinence pads utilized   skin-to-device areas padded   tubing/devices free from skin contact  Intervention: Prevent and Manage VTE (venous thromboembolism) Risk  Recent Flowsheet Documentation  Taken 3/5/2022 1200 by Edy Tripp RN  VTE Prevention/Management:   bilateral   sequential compression devices off   other (see comments)  Taken 3/5/2022 0800 by Edy Tripp RN  VTE Prevention/Management: (Lovenox)   bilateral   sequential compression devices off   other (see comments)  Intervention: Prevent Infection  Recent Flowsheet Documentation  Taken 3/5/2022 1400 by Edy Tripp RN  Infection Prevention:   hand hygiene promoted   personal protective equipment utilized   rest/sleep promoted  Taken 3/5/2022 1200 by Edy Tripp RN  Infection Prevention:   hand hygiene promoted   personal protective equipment utilized   rest/sleep promoted  Taken 3/5/2022 1000 by Edy Tripp RN  Infection Prevention:   hand hygiene promoted   personal protective equipment utilized   rest/sleep promoted  Taken 3/5/2022 0800 by Edy Tripp RN  Infection Prevention:   hand hygiene promoted   personal protective equipment utilized   rest/sleep promoted  Goal: Optimal Comfort and Wellbeing  Intervention: Provide Person-Centered Care  Recent Flowsheet Documentation  Taken 3/5/2022 1200 by Edy Tripp  RN  Trust Relationship/Rapport:   care explained   choices provided   emotional support provided   empathic listening provided   questions answered   questions encouraged   reassurance provided   thoughts/feelings acknowledged  Taken 3/5/2022 0800 by Edy Tripp RN  Trust Relationship/Rapport:   reassurance provided   thoughts/feelings acknowledged     Problem: Adult Inpatient Plan of Care  Goal: Plan of Care Review  Outcome: Ongoing, Progressing  Goal: Patient-Specific Goal (Individualized)  Outcome: Ongoing, Progressing  Goal: Absence of Hospital-Acquired Illness or Injury  Outcome: Ongoing, Progressing  Intervention: Identify and Manage Fall Risk  Recent Flowsheet Documentation  Taken 3/5/2022 1400 by Edy Tripp RN  Safety Promotion/Fall Prevention:   safety round/check completed   room organization consistent   nonskid shoes/slippers when out of bed   fall prevention program maintained   clutter free environment maintained   assistive device/personal items within reach  Taken 3/5/2022 1200 by Edy Tripp RN  Safety Promotion/Fall Prevention:   safety round/check completed   room organization consistent   nonskid shoes/slippers when out of bed   lighting adjusted   fall prevention program maintained   clutter free environment maintained   assistive device/personal items within reach   activity supervised  Taken 3/5/2022 1000 by Edy Tripp RN  Safety Promotion/Fall Prevention:   safety round/check completed   room organization consistent   nonskid shoes/slippers when out of bed   fall prevention program maintained   clutter free environment maintained   assistive device/personal items within reach  Taken 3/5/2022 0800 by Edy Tripp RN  Safety Promotion/Fall Prevention:   safety round/check completed   room organization consistent   nonskid shoes/slippers when out of bed   lighting adjusted   fall prevention program maintained   clutter free environment maintained   assistive  device/personal items within reach   activity supervised  Intervention: Prevent Skin Injury  Recent Flowsheet Documentation  Taken 3/5/2022 1200 by Edy Tripp RN  Skin Protection:   adhesive use limited   incontinence pads utilized   skin-to-device areas padded   tubing/devices free from skin contact  Taken 3/5/2022 0800 by Edy Tripp RN  Skin Protection:   adhesive use limited   incontinence pads utilized   skin-to-device areas padded   tubing/devices free from skin contact  Intervention: Prevent and Manage VTE (Venous Thromboembolism) Risk  Recent Flowsheet Documentation  Taken 3/5/2022 1200 by Edy Tripp RN  VTE Prevention/Management:   bilateral   sequential compression devices off   other (see comments)  Taken 3/5/2022 0800 by Edy Tripp RN  VTE Prevention/Management: (Lovenox)   bilateral   sequential compression devices off   other (see comments)  Intervention: Prevent Infection  Recent Flowsheet Documentation  Taken 3/5/2022 1400 by Edy Tripp RN  Infection Prevention:   hand hygiene promoted   personal protective equipment utilized   rest/sleep promoted  Taken 3/5/2022 1200 by Edy Tripp RN  Infection Prevention:   hand hygiene promoted   personal protective equipment utilized   rest/sleep promoted  Taken 3/5/2022 1000 by Edy Tripp RN  Infection Prevention:   hand hygiene promoted   personal protective equipment utilized   rest/sleep promoted  Taken 3/5/2022 0800 by Edy Tripp RN  Infection Prevention:   hand hygiene promoted   personal protective equipment utilized   rest/sleep promoted  Goal: Optimal Comfort and Wellbeing  Outcome: Ongoing, Progressing  Intervention: Provide Person-Centered Care  Recent Flowsheet Documentation  Taken 3/5/2022 1200 by Edy Tripp RN  Trust Relationship/Rapport:   care explained   choices provided   emotional support provided   empathic listening provided   questions answered   questions encouraged   reassurance  provided   thoughts/feelings acknowledged  Taken 3/5/2022 0800 by Edy Tripp RN  Trust Relationship/Rapport:   reassurance provided   thoughts/feelings acknowledged  Goal: Readiness for Transition of Care  Outcome: Ongoing, Progressing     Problem: Heart Failure Comorbidity  Goal: Maintenance of Heart Failure Symptom Control  Intervention: Maintain Heart Failure-Management Strategies  Recent Flowsheet Documentation  Taken 3/5/2022 1400 by Edy Tripp RN  Medication Review/Management: medications reviewed  Taken 3/5/2022 1200 by Edy Tripp RN  Medication Review/Management: medications reviewed  Taken 3/5/2022 1000 by Edy Tripp RN  Medication Review/Management: medications reviewed  Taken 3/5/2022 0800 by Edy Tripp RN  Medication Review/Management: medications reviewed     Problem: Hypertension Comorbidity  Goal: Blood Pressure in Desired Range  Intervention: Maintain Hypertension-Management Strategies  Recent Flowsheet Documentation  Taken 3/5/2022 1400 by Edy Tripp RN  Medication Review/Management: medications reviewed  Taken 3/5/2022 1200 by Edy Tripp RN  Syncope Management: position changed slowly  Medication Review/Management: medications reviewed  Taken 3/5/2022 1000 by Edy Tripp RN  Medication Review/Management: medications reviewed  Taken 3/5/2022 0800 by Edy Tripp RN  Syncope Management: position changed slowly  Medication Review/Management: medications reviewed     Problem: Asthma Comorbidity  Goal: Maintenance of Asthma Control  Outcome: Ongoing, Progressing  Intervention: Maintain Asthma Symptom Control  Recent Flowsheet Documentation  Taken 3/5/2022 1400 by Edy Tripp RN  Medication Review/Management: medications reviewed  Taken 3/5/2022 1200 by Edy Tripp RN  Medication Review/Management: medications reviewed  Taken 3/5/2022 1000 by Edy Tripp RN  Medication Review/Management: medications reviewed  Taken 3/5/2022  0800 by Edy Tripp RN  Medication Review/Management: medications reviewed     Problem: Hypertension Comorbidity  Goal: Blood Pressure in Desired Range  Outcome: Ongoing, Progressing  Intervention: Maintain Blood Pressure Management  Recent Flowsheet Documentation  Taken 3/5/2022 1400 by Edy Tripp RN  Medication Review/Management: medications reviewed  Taken 3/5/2022 1200 by Edy Tripp RN  Syncope Management: position changed slowly  Medication Review/Management: medications reviewed  Taken 3/5/2022 1000 by Edy Tripp RN  Medication Review/Management: medications reviewed  Taken 3/5/2022 0800 by Edy Tripp RN  Syncope Management: position changed slowly  Medication Review/Management: medications reviewed     Problem: Skin Injury Risk Increased  Goal: Skin Health and Integrity  Intervention: Optimize Skin Protection  Recent Flowsheet Documentation  Taken 3/5/2022 1200 by Edy Tripp RN  Pressure Reduction Techniques:   frequent weight shift encouraged   positioned off wounds   pressure points protected   weight shift assistance provided  Pressure Reduction Devices:   pressure-redistributing mattress utilized   positioning supports utilized  Skin Protection:   adhesive use limited   incontinence pads utilized   skin-to-device areas padded   tubing/devices free from skin contact  Taken 3/5/2022 0800 by Edy Tripp RN  Pressure Reduction Techniques:   frequent weight shift encouraged   positioned off wounds   pressure points protected   weight shift assistance provided  Pressure Reduction Devices:   positioning supports utilized   pressure-redistributing mattress utilized  Skin Protection:   adhesive use limited   incontinence pads utilized   skin-to-device areas padded   tubing/devices free from skin contact     Problem: Skin Injury Risk Increased  Goal: Skin Health and Integrity  Outcome: Ongoing, Progressing  Intervention: Optimize Skin Protection  Recent Flowsheet  Documentation  Taken 3/5/2022 1200 by Edy Tripp RN  Pressure Reduction Techniques:   frequent weight shift encouraged   positioned off wounds   pressure points protected   weight shift assistance provided  Pressure Reduction Devices:   pressure-redistributing mattress utilized   positioning supports utilized  Skin Protection:   adhesive use limited   incontinence pads utilized   skin-to-device areas padded   tubing/devices free from skin contact  Taken 3/5/2022 0800 by Edy Tripp RN  Pressure Reduction Techniques:   frequent weight shift encouraged   positioned off wounds   pressure points protected   weight shift assistance provided  Pressure Reduction Devices:   positioning supports utilized   pressure-redistributing mattress utilized  Skin Protection:   adhesive use limited   incontinence pads utilized   skin-to-device areas padded   tubing/devices free from skin contact     Problem: Fall Injury Risk  Goal: Absence of Fall and Fall-Related Injury  Intervention: Identify and Manage Contributors to Fall Injury Risk  Recent Flowsheet Documentation  Taken 3/5/2022 1400 by Edy Tripp RN  Medication Review/Management: medications reviewed  Taken 3/5/2022 1200 by Edy Tripp RN  Medication Review/Management: medications reviewed  Taken 3/5/2022 1000 by Edy Tripp RN  Medication Review/Management: medications reviewed  Taken 3/5/2022 0800 by Edy Tripp RN  Medication Review/Management: medications reviewed  Intervention: Promote Injury-Free Environment  Recent Flowsheet Documentation  Taken 3/5/2022 1400 by Edy Tripp RN  Safety Promotion/Fall Prevention:   safety round/check completed   room organization consistent   nonskid shoes/slippers when out of bed   fall prevention program maintained   clutter free environment maintained   assistive device/personal items within reach  Taken 3/5/2022 1200 by Edy Tripp RN  Safety Promotion/Fall Prevention:   safety  round/check completed   room organization consistent   nonskid shoes/slippers when out of bed   lighting adjusted   fall prevention program maintained   clutter free environment maintained   assistive device/personal items within reach   activity supervised  Taken 3/5/2022 1000 by Edy Tripp RN  Safety Promotion/Fall Prevention:   safety round/check completed   room organization consistent   nonskid shoes/slippers when out of bed   fall prevention program maintained   clutter free environment maintained   assistive device/personal items within reach  Taken 3/5/2022 0800 by Edy Tripp RN  Safety Promotion/Fall Prevention:   safety round/check completed   room organization consistent   nonskid shoes/slippers when out of bed   lighting adjusted   fall prevention program maintained   clutter free environment maintained   assistive device/personal items within reach   activity supervised     Problem: Fall Injury Risk  Goal: Absence of Fall and Fall-Related Injury  Outcome: Ongoing, Progressing  Intervention: Identify and Manage Contributors  Recent Flowsheet Documentation  Taken 3/5/2022 1400 by Edy Tripp RN  Medication Review/Management: medications reviewed  Taken 3/5/2022 1200 by Edy Tripp RN  Medication Review/Management: medications reviewed  Taken 3/5/2022 1000 by Edy Tripp RN  Medication Review/Management: medications reviewed  Taken 3/5/2022 0800 by Edy Tripp RN  Medication Review/Management: medications reviewed  Intervention: Promote Injury-Free Environment  Recent Flowsheet Documentation  Taken 3/5/2022 1400 by Edy Tripp RN  Safety Promotion/Fall Prevention:   safety round/check completed   room organization consistent   nonskid shoes/slippers when out of bed   fall prevention program maintained   clutter free environment maintained   assistive device/personal items within reach  Taken 3/5/2022 1200 by Edy Tripp RN  Safety Promotion/Fall  Prevention:   safety round/check completed   room organization consistent   nonskid shoes/slippers when out of bed   lighting adjusted   fall prevention program maintained   clutter free environment maintained   assistive device/personal items within reach   activity supervised  Taken 3/5/2022 1000 by Edy Tripp RN  Safety Promotion/Fall Prevention:   safety round/check completed   room organization consistent   nonskid shoes/slippers when out of bed   fall prevention program maintained   clutter free environment maintained   assistive device/personal items within reach  Taken 3/5/2022 0800 by Edy Tripp RN  Safety Promotion/Fall Prevention:   safety round/check completed   room organization consistent   nonskid shoes/slippers when out of bed   lighting adjusted   fall prevention program maintained   clutter free environment maintained   assistive device/personal items within reach   activity supervised     Problem: Pain Acute  Goal: Acceptable Pain Control and Functional Ability  Outcome: Ongoing, Progressing  Intervention: Prevent or Manage Pain  Recent Flowsheet Documentation  Taken 3/5/2022 1400 by Edy Tripp RN  Medication Review/Management: medications reviewed  Taken 3/5/2022 1200 by Edy Tripp RN  Sensory Stimulation Regulation:   auditory stimulation minimized   care clustered   lighting decreased  Medication Review/Management: medications reviewed  Taken 3/5/2022 1000 by Edy Tripp RN  Medication Review/Management: medications reviewed  Taken 3/5/2022 0800 by Edy Tripp RN  Sensory Stimulation Regulation:   auditory stimulation minimized   care clustered   lighting decreased  Medication Review/Management: medications reviewed  Goal: Acceptable Pain Control and Functional Ability  Outcome: Ongoing, Progressing  Intervention: Prevent or Manage Pain  Recent Flowsheet Documentation  Taken 3/5/2022 1400 by Edy Tripp RN  Medication Review/Management: medications  reviewed  Taken 3/5/2022 1200 by Edy Tripp, RN  Sensory Stimulation Regulation:   auditory stimulation minimized   care clustered   lighting decreased  Medication Review/Management: medications reviewed  Taken 3/5/2022 1000 by Edy Tripp RN  Medication Review/Management: medications reviewed  Taken 3/5/2022 0800 by Edy Tripp, RN  Sensory Stimulation Regulation:   auditory stimulation minimized   care clustered   lighting decreased  Medication Review/Management: medications reviewed

## 2022-03-07 LAB
ANION GAP SERPL CALCULATED.3IONS-SCNC: 12 MMOL/L (ref 5–15)
BASOPHILS # BLD AUTO: 0.1 10*3/MM3 (ref 0–0.2)
BASOPHILS NFR BLD AUTO: 1.3 % (ref 0–1.5)
BUN SERPL-MCNC: 16 MG/DL (ref 8–23)
BUN/CREAT SERPL: 17.6 (ref 7–25)
CALCIUM SPEC-SCNC: 9.1 MG/DL (ref 8.6–10.5)
CHLORIDE SERPL-SCNC: 100 MMOL/L (ref 98–107)
CO2 SERPL-SCNC: 27 MMOL/L (ref 22–29)
CREAT SERPL-MCNC: 0.91 MG/DL (ref 0.57–1)
DEPRECATED RDW RBC AUTO: 53.4 FL (ref 37–54)
EGFRCR SERPLBLD CKD-EPI 2021: 63.9 ML/MIN/1.73
EOSINOPHIL # BLD AUTO: 0.1 10*3/MM3 (ref 0–0.4)
EOSINOPHIL NFR BLD AUTO: 2.6 % (ref 0.3–6.2)
ERYTHROCYTE [DISTWIDTH] IN BLOOD BY AUTOMATED COUNT: 19.3 % (ref 12.3–15.4)
GLUCOSE SERPL-MCNC: 94 MG/DL (ref 65–99)
HCT VFR BLD AUTO: 33.8 % (ref 34–46.6)
HGB BLD-MCNC: 10.7 G/DL (ref 12–15.9)
LYMPHOCYTES # BLD AUTO: 1.9 10*3/MM3 (ref 0.7–3.1)
LYMPHOCYTES NFR BLD AUTO: 37.1 % (ref 19.6–45.3)
MAGNESIUM SERPL-MCNC: 2.1 MG/DL (ref 1.6–2.4)
MCH RBC QN AUTO: 25.1 PG (ref 26.6–33)
MCHC RBC AUTO-ENTMCNC: 31.6 G/DL (ref 31.5–35.7)
MCV RBC AUTO: 79.3 FL (ref 79–97)
MONOCYTES # BLD AUTO: 0.6 10*3/MM3 (ref 0.1–0.9)
MONOCYTES NFR BLD AUTO: 12 % (ref 5–12)
NEUTROPHILS NFR BLD AUTO: 2.4 10*3/MM3 (ref 1.7–7)
NEUTROPHILS NFR BLD AUTO: 47 % (ref 42.7–76)
NRBC BLD AUTO-RTO: 0.1 /100 WBC (ref 0–0.2)
PLATELET # BLD AUTO: 217 10*3/MM3 (ref 140–450)
PMV BLD AUTO: 8.5 FL (ref 6–12)
POTASSIUM SERPL-SCNC: 4 MMOL/L (ref 3.5–5.2)
RBC # BLD AUTO: 4.26 10*6/MM3 (ref 3.77–5.28)
SODIUM SERPL-SCNC: 139 MMOL/L (ref 136–145)
WBC NRBC COR # BLD: 5.1 10*3/MM3 (ref 3.4–10.8)

## 2022-03-07 PROCEDURE — 97535 SELF CARE MNGMENT TRAINING: CPT

## 2022-03-07 PROCEDURE — 25010000002 ENOXAPARIN PER 10 MG: Performed by: PHYSICIAN ASSISTANT

## 2022-03-07 PROCEDURE — 97162 PT EVAL MOD COMPLEX 30 MIN: CPT

## 2022-03-07 PROCEDURE — 83735 ASSAY OF MAGNESIUM: CPT | Performed by: PHYSICIAN ASSISTANT

## 2022-03-07 PROCEDURE — 99233 SBSQ HOSP IP/OBS HIGH 50: CPT | Performed by: INTERNAL MEDICINE

## 2022-03-07 PROCEDURE — 85025 COMPLETE CBC W/AUTO DIFF WBC: CPT | Performed by: PHYSICIAN ASSISTANT

## 2022-03-07 PROCEDURE — 25010000002 FUROSEMIDE PER 20 MG: Performed by: PHYSICIAN ASSISTANT

## 2022-03-07 PROCEDURE — 25010000002 AMIODARONE IN DEXTROSE 5% 360-4.14 MG/200ML-% SOLUTION: Performed by: NURSE PRACTITIONER

## 2022-03-07 PROCEDURE — 25010000002 ONDANSETRON PER 1 MG: Performed by: PHYSICIAN ASSISTANT

## 2022-03-07 PROCEDURE — 99232 SBSQ HOSP IP/OBS MODERATE 35: CPT | Performed by: INTERNAL MEDICINE

## 2022-03-07 PROCEDURE — 97166 OT EVAL MOD COMPLEX 45 MIN: CPT

## 2022-03-07 PROCEDURE — 80048 BASIC METABOLIC PNL TOTAL CA: CPT | Performed by: PHYSICIAN ASSISTANT

## 2022-03-07 RX ORDER — AMIODARONE HYDROCHLORIDE 200 MG/1
200 TABLET ORAL 2 TIMES DAILY WITH MEALS
Status: DISCONTINUED | OUTPATIENT
Start: 2022-03-07 | End: 2022-03-10 | Stop reason: HOSPADM

## 2022-03-07 RX ADMIN — ATORVASTATIN CALCIUM 10 MG: 10 TABLET, FILM COATED ORAL at 20:36

## 2022-03-07 RX ADMIN — GABAPENTIN 600 MG: 600 TABLET, FILM COATED ORAL at 20:36

## 2022-03-07 RX ADMIN — Medication 10 ML: at 08:35

## 2022-03-07 RX ADMIN — CYANOCOBALAMIN TAB 1000 MCG 1000 MCG: 1000 TAB at 08:35

## 2022-03-07 RX ADMIN — HYDROCODONE BITARTRATE AND ACETAMINOPHEN 1 TABLET: 7.5; 325 TABLET ORAL at 12:43

## 2022-03-07 RX ADMIN — FAMOTIDINE 20 MG: 20 TABLET, FILM COATED ORAL at 08:35

## 2022-03-07 RX ADMIN — SILDENAFIL 20 MG: 20 TABLET, FILM COATED ORAL at 08:35

## 2022-03-07 RX ADMIN — METOPROLOL TARTRATE 25 MG: 25 TABLET, FILM COATED ORAL at 08:34

## 2022-03-07 RX ADMIN — ONDANSETRON 4 MG: 2 INJECTION INTRAMUSCULAR; INTRAVENOUS at 05:34

## 2022-03-07 RX ADMIN — Medication 10 ML: at 20:37

## 2022-03-07 RX ADMIN — DIGOXIN 125 MCG: 125 TABLET ORAL at 12:44

## 2022-03-07 RX ADMIN — LEVOTHYROXINE SODIUM 88 MCG: 0.09 TABLET ORAL at 05:29

## 2022-03-07 RX ADMIN — ROPINIROLE HYDROCHLORIDE 0.25 MG: 0.25 TABLET, FILM COATED ORAL at 20:36

## 2022-03-07 RX ADMIN — AMIODARONE HYDROCHLORIDE 200 MG: 200 TABLET ORAL at 18:11

## 2022-03-07 RX ADMIN — SERTRALINE 50 MG: 50 TABLET, FILM COATED ORAL at 20:36

## 2022-03-07 RX ADMIN — GABAPENTIN 600 MG: 600 TABLET, FILM COATED ORAL at 17:52

## 2022-03-07 RX ADMIN — SILDENAFIL 20 MG: 20 TABLET, FILM COATED ORAL at 20:36

## 2022-03-07 RX ADMIN — FUROSEMIDE 40 MG: 10 INJECTION, SOLUTION INTRAVENOUS at 08:34

## 2022-03-07 RX ADMIN — POTASSIUM CHLORIDE 40 MEQ: 1500 TABLET, EXTENDED RELEASE ORAL at 08:35

## 2022-03-07 RX ADMIN — GABAPENTIN 600 MG: 600 TABLET, FILM COATED ORAL at 12:44

## 2022-03-07 RX ADMIN — HYDROCODONE BITARTRATE AND ACETAMINOPHEN 1 TABLET: 7.5; 325 TABLET ORAL at 05:33

## 2022-03-07 RX ADMIN — ENOXAPARIN SODIUM 70 MG: 100 INJECTION SUBCUTANEOUS at 17:52

## 2022-03-07 RX ADMIN — PANTOPRAZOLE SODIUM 40 MG: 40 TABLET, DELAYED RELEASE ORAL at 12:44

## 2022-03-07 RX ADMIN — ENOXAPARIN SODIUM 70 MG: 100 INJECTION SUBCUTANEOUS at 03:39

## 2022-03-07 RX ADMIN — Medication 10 ML: at 08:36

## 2022-03-07 RX ADMIN — GABAPENTIN 600 MG: 600 TABLET, FILM COATED ORAL at 08:35

## 2022-03-07 RX ADMIN — AMIODARONE HYDROCHLORIDE 1 MG/MIN: 1.8 INJECTION, SOLUTION INTRAVENOUS at 14:07

## 2022-03-07 RX ADMIN — METOPROLOL TARTRATE 25 MG: 25 TABLET, FILM COATED ORAL at 20:36

## 2022-03-07 RX ADMIN — Medication 5 MG: at 20:36

## 2022-03-07 NOTE — THERAPY EVALUATION
Patient Name: Catalina Moreno  : 1940    MRN: [id number]                              Today's Date: 3/7/2022       Admit Date: 3/4/2022    Visit Dx:     ICD-10-CM ICD-9-CM   1. Atrial fibrillation with RVR ([facility])  I48.91 427.31   2. Chest pain, unspecified type  R07.9 786.50   3. Acute on chronic diastolic congestive heart failure ([facility])  I50.33 428.33     428.0   4. Chronic left-sided low back pain without sciatica  M54.50 724.2    G89.29 338.29     Patient Active Problem List   Diagnosis   • Arthritis   • Neuropathic pain   • Other long term (current) drug therapy   • Polyarthralgia   • Sacroiliitis ([facility])   • Depressive disorder   • Primary fibromyalgia syndrome   • Gastroesophageal reflux disease   • Essential hypertension   • Lightheadedness   • Hypothyroidism   • Lumbar radiculopathy   • Altered mental status   • Acute on chronic diastolic congestive heart failure ([facility])   • Nonrheumatic tricuspid valve regurgitation   • Atrial fibrillation with RVR ([facility])   • Pulmonary hypertension ([facility])   • Acquired spondylolisthesis of lumbosacral region   • Spondylolisthesis, lumbar region   • Vitamin D deficiency   • Stage 2 chronic kidney disease   • DDD (degenerative disc disease), cervical   • Chronic pain   • Chronic low back pain   • Cervical stenosis of spinal canal   • Anemia   • Polypharmacy   • Age-related cognitive decline   • Edema, unspecified   • Generalized anxiety disorder   • History of falling   • Insomnia, unspecified   • Major depressive disorder, single episode, unspecified   • Need for assistance with personal care   • Other dysphagia   • Polyneuropathy, unspecified   • Unsteadiness on feet   • Restless legs syndrome   • Vomiting, unspecified   • Weakness   • Acute on chronic diastolic (congestive) heart failure ([facility])   • Unspecified abdominal pain   • Paroxysmal atrial fibrillation ([facility])   • Chronic pain disorder   • Other intervertebral disc degeneration, lumbar region   • Essential (primary)  hypertension   • Spondylolisthesis, lumbar region   • Hypothyroidism, unspecified   • Gastro-esophageal reflux disease without esophagitis   • Pulmonary hypertension, unspecified (HCC)   • Chronic diastolic CHF (congestive heart failure) ([facility])     Past Medical History:   Diagnosis Date   • Anxiety    • Arthritis    • Atrial fibrillation (HCC)     paroxysmal   • Broken shoulder     left-- due to fall 11-7-19 was at [facility]   • Buttock pain     rt side   • DDD (degenerative disc disease), lumbar    • Depression    • Edema     9/2020 foot   • GERD (gastroesophageal reflux disease)    • H/O fall    • Hip pain     rt   • Hypertension    • Hypothyroidism    • Insomnia    • Leg pain     rt   • Low back pain    • Neuropathy    • PONV (postoperative nausea and vomiting)    • Pulmonary hypertension (HCC)    • Radiculopathy    • Restless legs    • Spondylolisthesis    • Urinary incontinence      Past Surgical History:   Procedure Laterality Date   • BACK SURGERY  07/19/2018    PDC &  PSF L3-L5 insitu   • CARDIAC CATHETERIZATION N/A 4/2/2021    Procedure: Cardiac Catheterization/Vascular Study;  Surgeon: Barrett Evans MD;  Location: [facility] INVASIVE LOCATION;  Service: Cardiovascular;  Laterality: N/A;   • CHOLECYSTECTOMY     • COLONOSCOPY     • HYSTERECTOMY     • ROTATOR CUFF REPAIR Left       General Information     Row Name 03/07/22 1114          Physical Therapy Time and Intention    Document Type evaluation  -EL (r) SK (t) EL (c)     Mode of Treatment individual therapy;physical therapy  -EL (r) SK (t) EL (c)     Row Name 03/07/22 1114          General Information    Patient Profile Reviewed yes  -EL (r) SK (t) EL (c)     Prior Level of Function independent:;all household mobility;gait;home management;community mobility  Pt is generally independent with all ADLs and uses a RW at home for ambulation.  -EL (r) SK (t) EL (c)     Barriers to Rehab impaired sensation  Peripheral neuropathy of BL feet  -EL (r) SK  (t) EL (c)     Row Name 03/07/22 1114          Living Environment    People in Home alone  -EL (r) SK (t) EL (c)     Row Name 03/07/22 1114          Home Main Entrance    Number of Stairs, Main Entrance none  -EL (r) SK (t) EL (c)     Row Name 03/07/22 1114          Cognition    Orientation Status (Cognition) oriented x 4  -EL (r) SK (t) EL (c)     Row Name 03/07/22 1114          Safety Issues, Functional Mobility    Impairments Affecting Function (Mobility) balance;endurance/activity tolerance;other (see comments);sensation/sensory awareness;pain  Increased in dizziness without change in vitals  -EL (r) SK (t) EL (c)           User Key  (r) = Recorded By, (t) = Taken By, (c) = Cosigned By    Initials Name Provider Type    Vidal Barrientos, PT Physical Therapist    Roberta Mendiola, PT Student PT Student               Mobility     Row Name 03/07/22 1118          Bed Mobility    Bed Mobility bed mobility (all) activities  -EL (r) SK (t) EL (c)     All Activities, [place] (Bed Mobility) modified independence  -EL (r) SK (t) EL (c)     Assistive Device (Bed Mobility) bed rails;head of bed elevated  -EL (r) SK (t) EL (c)     Row Name 03/07/22 1118          Bed-Chair Transfer    Bed-Chair [place] (Transfers) standby assist  -EL (r) SK (t) EL (c)     Assistive Device (Bed-Chair Transfers) walker, front-wheeled  -EL (r) SK (t) EL (c)     Row Name 03/07/22 1118          Sit-Stand Transfer    Sit-Stand [place] (Transfers) standby assist  -EL (r) SK (t) EL (c)     Assistive Device (Sit-Stand Transfers) walker, front-wheeled  -EL (r) SK (t) EL (c)     Row Name 03/07/22 1118          Gait/Stairs (Locomotion)    [place] Level (Gait) contact guard  -EL (r) SK (t) EL (c)     Assistive Device (Gait) walker, front-wheeled  -EL (r) SK (t) EL (c)     Distance in Feet (Gait) 40  -EL (r) SK (t) EL (c)     Deviations/Abnormal Patterns (Gait) right sided deviations;gait speed decreased  -EL (r) SK (t) EL (c)            User Key  (r) = Recorded By, (t) = Taken By, (c) = Cosigned By    Initials Name Provider Type    Vidal Barrientos, PT Physical Therapist    Roberta Mendiola, PT Student PT Student               Obj/Interventions     Row Name 03/07/22 1120          Range of Motion Comprehensive    General Range of Motion bilateral lower extremity ROM WFL  -EL (r) SK (t) EL (c)     Row Name 03/07/22 1120          Strength Comprehensive (MMT)    General Manual Muscle Testing (MMT) Assessment other (see comments)  -EL (r) SK (t) EL (c)     Comment, General Manual Muscle Testing (MMT) Assessment BLE grossly 3+/5  -EL (r) SK (t) EL (c)     Row Name 03/07/22 1120          Balance    Balance Assessment sitting static balance;sitting dynamic balance;sit to stand dynamic balance;standing static balance;standing dynamic balance  -EL (r) SK (t) EL (c)     Static Sitting Balance independent  -EL (r) SK (t) EL (c)     Dynamic Sitting Balance independent  -EL (r) SK (t) EL (c)     Position, Sitting Balance sitting edge of bed  -EL (r) SK (t) EL (c)     Sit to Stand Dynamic Balance modified independence  -EL (r) SK (t) EL (c)     Static Standing Balance contact guard  Increased in dizziness with postural changes  -EL (r) SK (t) EL (c)     Dynamic Standing Balance contact guard  Mild instability with walking  -EL (r) SK (t) EL (c)     Position/Device Used, Standing Balance walker, front-wheeled  -EL (r) SK (t) EL (c)     Row Name 03/07/22 1120          Sensory Assessment (Somatosensory)    Sensory Assessment (Somatosensory) sensation intact  BLE sensation intact but constant numbess present BL feet due to peripheral neuropathy  -EL (r) SK (t) EL (c)           User Key  (r) = Recorded By, (t) = Taken By, (c) = Cosigned By    Initials Name Provider Type    Vidal Barrientos, PT Physical Therapist    Roberta Mendiola, PT Student PT Student               Goals/Plan     Row Name 03/07/22 1124          Bed Mobility Goal 1 (PT)    Activity/Assistive Device (Bed  Mobility Goal 1, PT) bed mobility activities, all  -EL (r) SK (t) EL (c)     [place] Level/Cues Needed (Bed Mobility Goal 1, PT) independent  -EL (r) SK (t) EL (c)     Time Frame (Bed Mobility Goal 1, PT) long term goal (LTG);2 weeks  -EL (r) SK (t) EL (c)     Row Name 03/07/22 1124          Transfer Goal 1 (PT)    Activity/Assistive Device (Transfer Goal 1, PT) transfers, all  -EL (r) SK (t) EL (c)     [place] Level/Cues Needed (Transfer Goal 1, PT) modified independence  -EL (r) SK (t) EL (c)     Time Frame (Transfer Goal 1, PT) long term goal (LTG);2 weeks  -EL (r) SK (t) EL (c)     Row Name 03/07/22 1124          Gait Training Goal 1 (PT)    Activity/Assistive Device (Gait Training Goal 1, PT) gait (walking locomotion)  -EL (r) SK (t) EL (c)     [place] Level (Gait Training Goal 1, PT) modified independence  -EL (r) SK (t) EL (c)     Distance (Gait Training Goal 1, PT) 100ft  -EL (r) SK (t) EL (c)     Time Frame (Gait Training Goal 1, PT) long term goal (LTG);2 weeks  -EL (r) SK (t) EL (c)           User Key  (r) = Recorded By, (t) = Taken By, (c) = Cosigned By    Initials Name Provider Type    Vidal Barrientos, PT Physical Therapist    Roberta Mendiola, PT Student PT Student               Clinical Impression     Row Name 03/07/22 1122          Pain    Pretreatment Pain Rating 2/10  -EL (r) SK (t) EL (c)     Posttreatment Pain Rating 0/10 - no pain  -EL (r) SK (t) EL (c)     Pain Intervention(s) Ambulation/increased activity  -EL (r) SK (t) EL (c)     Row Name 03/07/22 1135 03/07/22 1122       Plan of Care Review    Plan of Care Reviewed With -- patient  -EL (r) SK (t) EL (c)    Progress -- no change  -EL (r) SK (t) EL (c)    Outcome Evaluation 82 y/o F presents with c/o back pain, chest pain and dyspnea. Pt has PMH of A fib, arthritis and multiple falls leading to orthopedic related injuries and surgeries. On this admission, pt is diagnosed with A fib with RVR and acute on CHF. Pt lives alone in a  [facility] with no VLADIMIR. Pt is generally independent with all ADLs using a RW. This date, pt completed all bed mobility with mod I, transfers with SBA and ambulation with CGA using a RW. Mild instability and right sided veering noted when ambulating along with increased in dizziness with all positional changes and activity without vitals deviation. At this time, recommending IP rehab but pending progress to decrease risk of falls and ensure patient safety in D/C environment.  -EL (r) SK (t) EL (c) --    Row Name 03/07/22 1122          Therapy Assessment/Plan (PT)    Rehab Potential (PT) good, to achieve stated therapy goals  -EL (r) SK (t) EL (c)     Criteria for Skilled Interventions Met (PT) yes;meets criteria  -EL (r) SK (t) EL (c)     Predicted Duration of Therapy Intervention (PT) Until D/C  -EL (r) SK (t) EL (c)     Row Name 03/07/22 1122          Vital Signs    Pre Systolic BP Rehab 114  -EL (r) SK (t) EL (c)     Pre Treatment Diastolic BP 72  Sitting on the EOB  -EL (r) SK (t) EL (c)     Intra Systolic BP Rehab 110  -EL (r) SK (t) EL (c)     Intra Treatment Diastolic BP 84  Post ambulation  -EL (r) SK (t) EL (c)     O2 Delivery Pre Treatment room air  -EL (r) SK (t) EL (c)     O2 Delivery Intra Treatment room air  -EL (r) SK (t) EL (c)     O2 Delivery Post Treatment room air  -EL (r) SK (t) EL (c)     Pre Patient Position Supine  -EL (r) SK (t) EL (c)     Intra Patient Position Standing  -EL (r) SK (t) EL (c)     Post Patient Position Sitting  -EL (r) SK (t) EL (c)     Row Name 03/07/22 1122          Positioning and Restraints    Pre-Treatment Position in bed  -EL (r) SK (t) EL (c)     Post Treatment Position chair  -EL (r) SK (t) EL (c)     In Chair reclined;call light within reach;encouraged to call for assist;exit alarm on  -EL (r) SK (t) EL (c)           User Key  (r) = Recorded By, (t) = Taken By, (c) = Cosigned By    Initials Name Provider Type    Vidal Barrientos, PT Physical Therapist    SK Roberta Gonzalez, PT  Student PT Student               Outcome Measures     Row Name 03/07/22 1125          How much help from another person do you currently need...    Turning from your back to your side while in flat bed without using bedrails? 3  -EL (r) SK (t) EL (c)     Moving from lying on back to sitting on the side of a flat bed without bedrails? 3  -EL (r) SK (t) EL (c)     Moving to and from a bed to a chair (including a wheelchair)? 3  -EL (r) SK (t) EL (c)     Standing up from a chair using your arms (e.g., wheelchair, bedside chair)? 3  -EL (r) SK (t) EL (c)     Climbing 3-5 steps with a railing? 2  -EL (r) SK (t) EL (c)     To walk in hospital room? 3  -EL (r) SK (t) EL (c)     AM-PAC 6 Clicks Score (PT) 17  -EL (r) SK (t)     Row Name 03/07/22 1125          Functional Assessment    Outcome Measure Options AM-PAC 6 Clicks Basic Mobility (PT)  -EL (r) SK (t) EL (c)           User Key  (r) = Recorded By, (t) = Taken By, (c) = Cosigned By    Initials Name Provider Type    Vidal Barrientos, PT Physical Therapist    Roberta Mendiola, PT Student PT Student                             Physical Therapy Education                 Title: PT OT SLP Therapies (Done)     Topic: Physical Therapy (Done)     Point: Mobility training (Done)     Learning Progress Summary           Patient Acceptance, E, VU by SK at 3/7/2022 1126                   Point: Precautions (Done)     Learning Progress Summary           Patient Acceptance, E, VU by SK at 3/7/2022 1126                               User Key     Initials Effective Dates Name Provider Type [facility] 01/25/22 -  Roberta Gonzalez, PT Student PT Student PT              PT Recommendation and Plan  Planned Therapy Interventions (PT): balance training, gait training, strengthening, stair training, bed mobility training, transfer training, patient/family education  Plan of Care Reviewed With: patient  Progress: no change  Outcome Evaluation: 80 y/o F presents with c/o back pain, chest pain  and dyspnea. Pt has PMH of A fib, arthritis and multiple falls leading to orthopedic related injuries and surgeries. On this admission, pt is diagnosed with A fib with RVR and acute on CHF. Pt lives alone in a [facility] with no VLADIMIR. Pt is generally independent with all ADLs using a RW. This date, pt completed all bed mobility with mod I, transfers with SBA and ambulation with CGA using a RW. Mild instability and right sided veering noted when ambulating along with increased in dizziness with all positional changes and activity without vitals deviation. At this time, recommending IP rehab but pending progress to decrease risk of falls and ensure patient safety in D/C environment.     Time Calculation:    PT Charges     Row Name 03/07/22 1135             Time Calculation    Start Time 0930  -EL (r) SK (t) EL (c)      Stop Time 0954  -EL (r) SK (t) EL (c)      Time Calculation (min) 24 min  -EL (r) SK (t)      PT Received On 03/07/22  -EL (r) SK (t) EL (c)      PT - Next Appointment 03/08/22  -EL (r) SK (t) EL (c)      PT Goal Re-Cert Due Date 03/21/22  -EL (r) SK (t) EL (c)            User Key  (r) = Recorded By, (t) = Taken By, (c) = Cosigned By    Initials Name Provider Type    Vidal Barrientos, PT Physical Therapist    Roberta Mendiola PT Student PT Student              Therapy Charges for Today     Code Description Service Date Service Provider Modifiers Qty    [id number] HC-PT EVAL MOD COMPLEXITY 5 3/7/2022 Roberta Gonzalez PT Student  1          PT G-Codes  Outcome Measure Options: AM-PAC 6 Clicks Basic Mobility (PT)  AM-PAC 6 Clicks Score (PT): 17    Roberta Gonzalez PT Student  3/7/2022

## 2022-03-07 NOTE — THERAPY EVALUATION
Patient Name: Catalina Moreno  : 1940    MRN: [id number]                              Today's Date: 3/7/2022       Admit Date: 3/4/2022    Visit Dx:     ICD-10-CM ICD-9-CM   1. Atrial fibrillation with RVR (Formerly [facility])  I48.91 427.31   2. Chest pain, unspecified type  R07.9 786.50   3. Acute on chronic diastolic congestive heart failure (Formerly [facility])  I50.33 428.33     428.0   4. Chronic left-sided low back pain without sciatica  M54.50 724.2    G89.29 338.29     Patient Active Problem List   Diagnosis   • Arthritis   • Neuropathic pain   • Other long term (current) drug therapy   • Polyarthralgia   • Sacroiliitis (Formerly [facility])   • Depressive disorder   • Primary fibromyalgia syndrome   • Gastroesophageal reflux disease   • Essential hypertension   • Lightheadedness   • Hypothyroidism   • Lumbar radiculopathy   • Altered mental status   • Acute on chronic diastolic congestive heart failure (Former[facility])   • Nonrheumatic tricuspid valve regurgitation   • Atrial fibrillation with RVR ([facility])   • Pulmonary hypertension (Formerly [facility])   • Acquired spondylolisthesis of lumbosacral region   • Spondylolisthesis, lumbar region   • Vitamin D deficiency   • Stage 2 chronic kidney disease   • DDD (degenerative disc disease), cervical   • Chronic pain   • Chronic low back pain   • Cervical stenosis of spinal canal   • Anemia   • Polypharmacy   • Age-related cognitive decline   • Edema, unspecified   • Generalized anxiety disorder   • History of falling   • Insomnia, unspecified   • Major depressive disorder, single episode, unspecified   • Need for assistance with personal care   • Other dysphagia   • Polyneuropathy, unspecified   • Unsteadiness on feet   • Restless legs syndrome   • Vomiting, unspecified   • Weakness   • Acute on chronic diastolic (congestive) heart failure ([facility])   • Unspecified abdominal pain   • Paroxysmal atrial fibrillation ([facility])   • Chronic pain disorder   • Other intervertebral disc degeneration, lumbar region   • Essential (primary)  hypertension   • Spondylolisthesis, lumbar region   • Hypothyroidism, unspecified   • Gastro-esophageal reflux disease without esophagitis   • Pulmonary hypertension, unspecified (HCC)   • Chronic diastolic CHF (congestive heart failure) ([facility])     Past Medical History:   Diagnosis Date   • Anxiety    • Arthritis    • Atrial fibrillation (HCC)     paroxysmal   • Broken shoulder     left-- due to fall 11-7-19 was at [facility]   • Buttock pain     rt side   • DDD (degenerative disc disease), lumbar    • Depression    • Edema     9/2020 foot   • GERD (gastroesophageal reflux disease)    • H/O fall    • Hip pain     rt   • Hypertension    • Hypothyroidism    • Insomnia    • Leg pain     rt   • Low back pain    • Neuropathy    • PONV (postoperative nausea and vomiting)    • Pulmonary hypertension (HCC)    • Radiculopathy    • Restless legs    • Spondylolisthesis    • Urinary incontinence      Past Surgical History:   Procedure Laterality Date   • BACK SURGERY  07/19/2018    PDC &  PSF L3-L5 insitu   • CARDIAC CATHETERIZATION N/A 4/2/2021    Procedure: Cardiac Catheterization/Vascular Study;  Surgeon: Barrett Evans MD;  Location: [facility] INVASIVE LOCATION;  Service: Cardiovascular;  Laterality: N/A;   • CHOLECYSTECTOMY     • COLONOSCOPY     • HYSTERECTOMY     • ROTATOR CUFF REPAIR Left       General Information     Row Name 03/07/22 1513          OT Time and Intention    Document Type evaluation  -ES     Mode of Treatment occupational therapy  -ES     Row Name 03/07/22 1513          General Information    Patient Profile Reviewed yes  -ES     Prior Level of Function independent:;ADL's;driving  -ES     Existing Precautions/Restrictions cardiac  -ES     Barriers to Rehab cognitive status;impaired sensation  -ES     Row Name 03/07/22 1513          Occupational Profile    Reason for Services/Referral (Occupational Profile) Pt is a 80 y/o F presents with c/o back pain, chest pain and dyspnea. Dx with A-fib, RVR,  and acute CHF. PMHx significant for A fib, arthritis and multiple falls leading to orthopedic related injuries and surgeries. At baseline, patient is independent, driving, and cares for dog at home. She has a son who is nearby to provide some support.  -ES     Successful Occupations (Occupational Profile) Factory  -ES     Row Name 03/07/22 1513          Living Environment    People in Home alone  -ES     Row Name 03/07/22 1513          Home Main Entrance    Number of Stairs, Main Entrance none  -ES     Row Name 03/07/22 1513          Cognition    Orientation Status (Cognition) oriented x 4  -ES     Row Name 03/07/22 1513          Safety Issues, Functional Mobility    Impairments Affecting Function (Mobility) balance;endurance/activity tolerance;other (see comments);sensation/sensory awareness;pain  Increased in dizziness without change in vitals  -ES           User Key  (r) = Recorded By, (t) = Taken By, (c) = Cosigned By    Initials Name Provider Type    ES Sarahi Rogers OT Occupational Therapist                 Mobility/ADL's     Row Name 03/07/22 1523          Bed Mobility    Bed Mobility bed mobility (all) activities  -ES     All Activities, Darke (Bed Mobility) modified independence  -ES     Assistive Device (Bed Mobility) bed rails;head of bed elevated  -ES     Row Name 03/07/22 1523          Transfers    Bed-Chair Darke (Transfers) standby assist  -ES     Assistive Device (Bed-Chair Transfers) walker, front-wheeled  -ES     Sit-Stand Darke (Transfers) standby assist  -ES     Row Name 03/07/22 1523          Sit-Stand Transfer    Assistive Device (Sit-Stand Transfers) walker, front-wheeled  -ES     Row Name 03/07/22 1523          Activities of Daily Living    BADL Assessment/Intervention upper body dressing;lower body dressing  -ES     Row Name 03/07/22 1523          Upper Body Dressing Assessment/Training    Darke Level (Upper Body Dressing) set up  -ES     Row Name 03/07/22  1523          Lower Body Dressing Assessment/Training    Sevier Level (Lower Body Dressing) minimum assist (75% patient effort)  -ES           User Key  (r) = Recorded By, (t) = Taken By, (c) = Cosigned By    Initials Name Provider Type    Sarahi Amos OT Occupational Therapist               Obj/Interventions     Row Name 03/07/22 1523          Sensory Assessment (Somatosensory)    Sensory Assessment (Somatosensory) --  chronic deficit - peripheral neuropathy  -ES     Row Name 03/07/22 1523          Vision Assessment/Intervention    Visual Impairment/Limitations --  Pt reports needing new glasses and is awaiting cataract surgery.  -ES     Row Name 03/07/22 1523          Range of Motion Comprehensive    General Range of Motion bilateral upper extremity ROM WNL  -ES     Row Name 03/07/22 1523          Strength Comprehensive (MMT)    General Manual Muscle Testing (MMT) Assessment upper extremity strength deficits identified  -ES     Comment, General Manual Muscle Testing (MMT) Assessment BUE Grossly 4/5  -ES     Row Name 03/07/22 1523          Balance    Balance Assessment sitting static balance;sitting dynamic balance;standing static balance;standing dynamic balance  -ES     Static Sitting Balance independent  -ES     Dynamic Sitting Balance independent  -ES     Static Standing Balance supervision  -ES     Dynamic Standing Balance contact guard  -ES     Balance Interventions sitting;standing  -ES           User Key  (r) = Recorded By, (t) = Taken By, (c) = Cosigned By    Initials Name Provider Type    Sarahi Amos OT Occupational Therapist               Goals/Plan     Row Name 03/07/22 1531          Bathing Goal 1 (OT)    Activity/Device (Bathing Goal 1, OT) bathing skills, all  -ES     Sevier Level/Cues Needed (Bathing Goal 1, OT) modified independence  -ES     Time Frame (Bathing Goal 1, OT) 2 weeks  -ES     Row Name 03/07/22 1531          Toileting Goal 1 (OT)    Activity/Device  (Toileting Goal 1, OT) toileting skills, all  -ES     Chalmers Level/Cues Needed (Toileting Goal 1, OT) modified independence  -ES     Time Frame (Toileting Goal 1, OT) 2 weeks  -ES     Row Name 03/07/22 1531          Strength Goal 1 (OT)    Strength Goal 1 (OT) BUE 5/5  -ES     Time Frame (Strength Goal 1, OT) 2 weeks  -ES     Row Name 03/07/22 1531          Therapy Assessment/Plan (OT)    Planned Therapy Interventions (OT) activity tolerance training;BADL retraining;IADL retraining;neuromuscular control/coordination retraining;occupation/activity based interventions;patient/caregiver education/training;ROM/therapeutic exercise;strengthening exercise;transfer/mobility retraining  -ES           User Key  (r) = Recorded By, (t) = Taken By, (c) = Cosigned By    Initials Name Provider Type    Sarahi Amos OT Occupational Therapist               Clinical Impression     Row Name 03/07/22 1525          Pain Assessment    Pretreatment Pain Rating 2/10  -ES     Posttreatment Pain Rating 2/10  -ES     Pain Location - back  -ES     Row Name 03/07/22 1525          Plan of Care Review    Plan of Care Reviewed With patient  -ES     Outcome Evaluation Pt is a 80 y/o F presents with c/o back pain, chest pain and dyspnea. Dx with A-fib, RVR, and acute CHF. MRI (-). PMHx significant for A fib, arthritis and multiple falls leading to orthopedic related injuries and surgeries. Pt reports stopping medication (Eliquis) due to significant cost increase.  At baseline, patient is independent, driving, and cares for dog at home. She has a son who is nearby to provide some support.  Pt moves well, requiring CGA - Min A for all ADLs and functional transfers. She demos mild weakness, decreased activity tolerance, and impaired balance. Pt acnkowledges decline in cognition over several weeks. Scores 19, 30 on MoCA 8.1 this date (see below for details), with questionable Mild Cognitive Impairment. Pt is far below stated baseline,  and will require IP rehab at discharge.  -ES     Row Name 03/07/22 1525          Therapy Assessment/Plan (OT)    Therapy Frequency (OT) 3 times/wk  -ES     Row Name 03/07/22 1525          Therapy Plan Review/Discharge Plan (OT)    Anticipated Discharge Disposition (OT) inpatient rehabilitation facility  -ES     Row Name 03/07/22 1525          Vital Signs    Pre Systolic BP Rehab 90  -ES     Pre Treatment Diastolic BP 54  -ES     Intra Systolic BP Rehab 94  -ES     Intra Treatment Diastolic BP 61  -ES     Post Systolic BP Rehab 88  -ES     Post Treatment Diastolic BP 50  -ES     O2 Delivery Pre Treatment room air  -ES     O2 Delivery Intra Treatment room air  -ES     O2 Delivery Post Treatment room air  -ES     Pre Patient Position Sitting  -ES     Intra Patient Position Standing  -ES     Post Patient Position Sitting  -ES     Row Name 03/07/22 1525          Positioning and Restraints    Pre-Treatment Position sitting in chair/recliner  -ES     Post Treatment Position chair  -ES     In Chair notified nsg;encouraged to call for assist;exit alarm on;with family/caregiver  -ES           User Key  (r) = Recorded By, (t) = Taken By, (c) = Cosigned By    Initials Name Provider Type    Sarahi Amos, MOHAN Occupational Therapist               Outcome Measures     Row Name 03/07/22 1532          How much help from another is currently needed...    Putting on and taking off regular lower body clothing? 3  -ES     Bathing (including washing, rinsing, and drying) 2  -ES     Toileting (which includes using toilet bed pan or urinal) 3  -ES     Putting on and taking off regular upper body clothing 3  -ES     Taking care of personal grooming (such as brushing teeth) 3  -ES     Eating meals 4  -ES     AM-PAC 6 Clicks Score (OT) 18  -ES     Row Name 03/07/22 1125          How much help from another person do you currently need...    Turning from your back to your side while in flat bed without using bedrails? 3  -EL (r) SK (t)  EL (c)     Moving from lying on back to sitting on the side of a flat bed without bedrails? 3  -EL (r) SK (t) EL (c)     Moving to and from a bed to a chair (including a wheelchair)? 3  -EL (r) SK (t) EL (c)     Standing up from a chair using your arms (e.g., wheelchair, bedside chair)? 3  -EL (r) SK (t) EL (c)     Climbing 3-5 steps with a railing? 2  -EL (r) SK (t) EL (c)     To walk in hospital room? 3  -EL (r) SK (t) EL (c)     AM-PAC 6 Clicks Score (PT) 17  -EL (r) SK (t)     Row Name 03/07/22 1532 03/07/22 1125       Functional Assessment    Outcome Measure Options AM-PAC 6 Clicks Daily Activity (OT)  -ES AM-PAC 6 Clicks Basic Mobility (PT)  -EL (r) SK (t) EL (c)          User Key  (r) = Recorded By, (t) = Taken By, (c) = Cosigned By    Initials Name Provider Type    ES Sarahi Rogers, OT Occupational Therapist    Vidal Barrientos, PT Physical Therapist    Roberta Mendiola, PT Student PT Student                  OT Recommendation and Plan  Planned Therapy Interventions (OT): activity tolerance training, BADL retraining, IADL retraining, neuromuscular control/coordination retraining, occupation/activity based interventions, patient/caregiver education/training, ROM/therapeutic exercise, strengthening exercise, transfer/mobility retraining  Therapy Frequency (OT): 3 times/wk  Plan of Care Review  Plan of Care Reviewed With: patient  Outcome Evaluation: Pt is a 80 y/o F presents with c/o back pain, chest pain and dyspnea. Dx with A-fib, RVR, and acute CHF. MRI (-). PMHx significant for A fib, arthritis and multiple falls leading to orthopedic related injuries and surgeries. Pt reports stopping medication (Eliquis) due to significant cost increase.  At baseline, patient is independent, driving, and cares for dog at home. She has a son who is nearby to provide some support.  Pt moves well, requiring CGA - Min A for all ADLs and functional transfers. She demos mild weakness, decreased activity tolerance, and  impaired balance. Pt acnkowledges decline in cognition over several weeks. Scores 19, 30 on MoCA 8.1 this date (see below for details), with questionable Mild Cognitive Impairment. Pt is far below stated baseline, and will require IP rehab at discharge.     Floyd Version:   Visiospatial / Executive- 3/5  Naming - 3/3  Attention - 4/6  Language - 3/3  Abstraction - 1/2  Delayed Recall - 0/5  Orientation - 5/6  Total: 19/30, indicating questionable Mild Cognitive Impairment     Time Calculation:    Time Calculation- OT     Row Name 03/07/22 1532             Time Calculation- OT    OT Start Time 1035  -ES      OT Stop Time 1115  -ES      OT Time Calculation (min) 40 min  -ES      Total Timed Code Minutes- OT 10 minute(s)  -ES      OT Received On 03/07/22  -ES      OT - Next Appointment 03/09/22  -ES      OT Goal Re-Cert Due Date 03/21/22  -ES            User Key  (r) = Recorded By, (t) = Taken By, (c) = Cosigned By    Initials Name Provider Type    ES Sarahi Rogers OT Occupational Therapist              Therapy Charges for Today     Code Description Service Date Service Provider Modifiers Qty    [id number] HC OT SELF CARE/MGMT/TRAIN EA 15 MIN 3/7/2022 Sarahi Rogers OT GO 1    [id number]  OT EVAL MOD COMPLEXITY 3 3/7/2022 Sarahi Rogers OT GO 1               Sarahi Rogers OT  3/7/2022

## 2022-03-07 NOTE — PLAN OF CARE
Goal Outcome Evaluation:       Pharmacist Future Anticoagulation COST Assessment (AMBAR):     Pharmacy ran test claim for future anticoagulation therapy. Indication: Afib    Drug Covered/PA required Patient Copay per month   Eliquis Covered without PA $ 45   Xarelto Covered without PA $ 45            [facility] pharmacy can provide first month free to patient through M2B service if chosen. (ALL patients eligible.)    Patient is currently signed up.      Mariam Cordero [facility]   3/7/2022 13:43 EST

## 2022-03-07 NOTE — CASE MANAGEMENT/SOCIAL WORK
Discharge Planning Assessment  [facility]     Patient Name: Catalina Moreno  MRN: [id number]  Today's Date: 3/7/2022    Admit Date: 3/4/2022     Discharge Needs Assessment     Row Name 03/07/22 1521       Living Environment    People in Home alone    Current Living Arrangements home    Primary Care Provided by self    Provides Primary Care For no one    Family Caregiver if Needed child(evangelista), adult    Family Caregiver Names Son Karri    Quality of Family Relationships supportive    Able to Return to Prior Arrangements yes       Resource/Environmental Concerns    Resource/Environmental Concerns none    Transportation Concerns no car       Transition Planning    Patient/Family Anticipates Transition to inpatient rehabilitation facility    Patient/Family Anticipated Services at Transition skilled nursing    Transportation Anticipated family or friend will provide       Discharge Needs Assessment    Equipment Currently Used at Home walker, rolling    Concerns to be Addressed discharge planning    Anticipated Changes Related to Illness none    Equipment Needed After Discharge none    Provided Post Acute Provider List? Yes    Post Acute Provider List Inpatient Rehab;Nursing Home    Provided Post Acute Provider Quality & Resource List? Yes    Post Acute Provider Quality and Resource List Nursing Home;Inpatient Rehab    Delivered To Patient    Method of Delivery In person    Patient's Choice of Community Agency(s) 1. [facility] 2. [facility]               Discharge Plan     Row Name 03/07/22 1522       Plan    [facility] will need precert and PASRR    Patient/Family in Agreement with Plan yes    Plan Comments Met with Patient at Bedside Lives at home alone. Has son Karri vázquez that provides transportation for patient. IADL's except driving and shopping. PCP and Pharmacy verified, patient stated that she has not been taking home eliquis do to them telling her it was over  $100/month, Price check completed and cost will be $45/month which is what she was paying. obtained rehab choices 1. [facility]- referral sent and liaison Sindi notified pending acceptance. 2. [facility]              Continued Care and Services - Admitted Since 3/4/2022     Destination     Service Provider Request Status Selected Services Address Phone Fax Patient Preferred    [facility]  Pending - Request Sent N/A [id number] [facility] [place] [facility] [phone] -- --                 Demographic Summary     Row Name 03/07/22 1520       General Information    Admission Type inpatient    Arrived From emergency department    Referral Source admission list    Reason for Consult decision-making    Preferred Language English               Functional Status     Row Name 03/07/22 1520       Functional Status    Usual Activity Tolerance moderate    Current Activity Tolerance moderate       Functional Status, IADL    Medications independent    Meal Preparation independent    Housekeeping independent    Laundry independent    Shopping assistive person       Mental Status    General Appearance WDL WDL       Mental Status Summary    Recent Changes in Mental Status/Cognitive Functioning no changes              Met with patient at bedside wearing mask and goggles, Spent less than 15 minutes in room at greater than 6 feet distance.     Irena Grier, RN

## 2022-03-07 NOTE — PLAN OF CARE
Goal Outcome Evaluation:                 80 y/o F admitted for afib w/ rvr. Full code. Pt has been up most of the shift. C/o back pain- Norco 7.5 mg Q 6 PRN ordered. Pt is up w/ SBA-1 assist to [facility]. PT is recommending IPR. Pt may not be agreeable. Pt is A/O x 4, calm cooperative. Pt has been on Amio gtt @ 0.5 mg/min. Dr. Gonzalez stated he wants to get the Pt off the gtt and on PO medication- Call placed to Dr. Gonzalez regarding the switch to PO Amio/meds- Dr. Gonzalez has turned off the Amio gtt and started PO Amio. Pt also on Digoxin- will monitor levels and s/sx toxicity. Pt c/o not being able to afford her Eliquis @ home. Pt had been off the Eliquis before her admit this stay. HR controlled with the Amio, anticoagulation is Lovenox Q 12 hrs- rounding this AM, CM stated Pt may have to be switched to more affordable anticoagulation med such as Warfarin @ [facility]- see CM note regarding the Eliquis- Pt may be able to get the Eliquis if she chooses to go thru [facility] meds to bed. BP runs low but is stable, afib, will cont to monitor.       Problem: Adult Inpatient Plan of Care  Goal: Absence of Hospital-Acquired Illness or Injury  Intervention: Identify and Manage Fall Risk  Recent Flowsheet Documentation  Taken 3/7/2022 1401 by Edy Tripp, RN  Safety Promotion/Fall Prevention:  • safety round/check completed  • room organization consistent  • nonskid shoes/slippers when out of bed  • fall prevention program maintained  • clutter free environment maintained  • assistive device/personal items within reach  Taken 3/7/2022 1200 by Edy Tripp, RN  Safety Promotion/Fall Prevention:  • safety round/check completed  • room organization consistent  • nonskid shoes/slippers when out of bed  • lighting adjusted  • fall prevention program maintained  • clutter free environment maintained  • activity supervised  • assistive device/personal items within reach  Taken 3/7/2022 1002 by Edy Tripp, RN  Safety Promotion/Fall  Prevention:  • safety round/check completed  • room organization consistent  • nonskid shoes/slippers when out of bed  • fall prevention program maintained  • clutter free environment maintained  • assistive device/personal items within reach  Taken 3/7/2022 0805 by Edy Tripp RN  Safety Promotion/Fall Prevention:  • room organization consistent  • safety round/check completed  • nonskid shoes/slippers when out of bed  • lighting adjusted  • fall prevention program maintained  • clutter free environment maintained  • assistive device/personal items within reach  • activity supervised  Intervention: Prevent Skin Injury  Recent Flowsheet Documentation  Taken 3/7/2022 1200 by Edy Tripp RN  Skin Protection:  • adhesive use limited  • incontinence pads utilized  • skin-to-device areas padded  • tubing/devices free from skin contact  Taken 3/7/2022 0805 by Edy Tripp RN  Skin Protection:  • adhesive use limited  • incontinence pads utilized  • skin-to-device areas padded  • tubing/devices free from skin contact  Intervention: Prevent and Manage VTE (venous thromboembolism) Risk  Recent Flowsheet Documentation  Taken 3/7/2022 1200 by Edy Tripp RN  VTE Prevention/Management:  • bilateral  • sequential compression devices off  Taken 3/7/2022 0805 by Edy Tripp RN  VTE Prevention/Management:  • bilateral  • sequential compression devices on  Intervention: Prevent Infection  Recent Flowsheet Documentation  Taken 3/7/2022 1401 by Edy Tripp RN  Infection Prevention:  • hand hygiene promoted  • personal protective equipment utilized  • rest/sleep promoted  Taken 3/7/2022 1200 by Edy Tripp RN  Infection Prevention:  • hand hygiene promoted  • personal protective equipment utilized  • rest/sleep promoted  Taken 3/7/2022 1002 by Edy Tripp RN  Infection Prevention:  • hand hygiene promoted  • personal protective equipment utilized  • rest/sleep promoted  Taken 3/7/2022 0805  by Edy Tripp RN  Infection Prevention:  • hand hygiene promoted  • personal protective equipment utilized  • rest/sleep promoted  Goal: Optimal Comfort and Wellbeing  Intervention: Provide Person-Centered Care  Recent Flowsheet Documentation  Taken 3/7/2022 1200 by Edy Tripp RN  Trust Relationship/Rapport:  • care explained  • choices provided  • emotional support provided  • empathic listening provided  • questions answered  • questions encouraged  • reassurance provided  • thoughts/feelings acknowledged  Taken 3/7/2022 0805 by Edy Tripp RN  Trust Relationship/Rapport:  • care explained  • choices provided  • emotional support provided  • empathic listening provided  • questions encouraged  • questions answered  • reassurance provided  • thoughts/feelings acknowledged     Problem: Adult Inpatient Plan of Care  Goal: Plan of Care Review  Outcome: Ongoing, Progressing  Goal: Patient-Specific Goal (Individualized)  Outcome: Ongoing, Progressing  Goal: Absence of Hospital-Acquired Illness or Injury  Outcome: Ongoing, Progressing  Intervention: Identify and Manage Fall Risk  Recent Flowsheet Documentation  Taken 3/7/2022 1401 by Edy Tripp RN  Safety Promotion/Fall Prevention:  • safety round/check completed  • room organization consistent  • nonskid shoes/slippers when out of bed  • fall prevention program maintained  • clutter free environment maintained  • assistive device/personal items within reach  Taken 3/7/2022 1200 by Edy Tripp RN  Safety Promotion/Fall Prevention:  • safety round/check completed  • room organization consistent  • nonskid shoes/slippers when out of bed  • lighting adjusted  • fall prevention program maintained  • clutter free environment maintained  • activity supervised  • assistive device/personal items within reach  Taken 3/7/2022 1002 by Edy Tripp RN  Safety Promotion/Fall Prevention:  • safety round/check completed  • room organization  consistent  • nonskid shoes/slippers when out of bed  • fall prevention program maintained  • clutter free environment maintained  • assistive device/personal items within reach  Taken 3/7/2022 0805 by Edy Tripp RN  Safety Promotion/Fall Prevention:  • room organization consistent  • safety round/check completed  • nonskid shoes/slippers when out of bed  • lighting adjusted  • fall prevention program maintained  • clutter free environment maintained  • assistive device/personal items within reach  • activity supervised  Intervention: Prevent Skin Injury  Recent Flowsheet Documentation  Taken 3/7/2022 1200 by Edy Tripp RN  Skin Protection:  • adhesive use limited  • incontinence pads utilized  • skin-to-device areas padded  • tubing/devices free from skin contact  Taken 3/7/2022 0805 by Edy Tripp RN  Skin Protection:  • adhesive use limited  • incontinence pads utilized  • skin-to-device areas padded  • tubing/devices free from skin contact  Intervention: Prevent and Manage VTE (Venous Thromboembolism) Risk  Recent Flowsheet Documentation  Taken 3/7/2022 1200 by Edy Tripp RN  VTE Prevention/Management:  • bilateral  • sequential compression devices off  Taken 3/7/2022 0805 by Edy Tripp RN  VTE Prevention/Management:  • bilateral  • sequential compression devices on  Intervention: Prevent Infection  Recent Flowsheet Documentation  Taken 3/7/2022 1401 by Edy Tripp RN  Infection Prevention:  • hand hygiene promoted  • personal protective equipment utilized  • rest/sleep promoted  Taken 3/7/2022 1200 by Edy Tripp RN  Infection Prevention:  • hand hygiene promoted  • personal protective equipment utilized  • rest/sleep promoted  Taken 3/7/2022 1002 by Edy Tripp RN  Infection Prevention:  • hand hygiene promoted  • personal protective equipment utilized  • rest/sleep promoted  Taken 3/7/2022 0805 by Edy Tripp RN  Infection Prevention:  • hand hygiene  promoted  • personal protective equipment utilized  • rest/sleep promoted  Goal: Optimal Comfort and Wellbeing  Outcome: Ongoing, Progressing  Intervention: Monitor Pain and Promote Comfort  Recent Flowsheet Documentation  Taken 3/7/2022 1200 by Edy Tripp RN  Pain Management Interventions: see MAR  Intervention: Provide Person-Centered Care  Recent Flowsheet Documentation  Taken 3/7/2022 1200 by Edy Tripp RN  Trust Relationship/Rapport:  • care explained  • choices provided  • emotional support provided  • empathic listening provided  • questions answered  • questions encouraged  • reassurance provided  • thoughts/feelings acknowledged  Taken 3/7/2022 0805 by Edy Tripp RN  Trust Relationship/Rapport:  • care explained  • choices provided  • emotional support provided  • empathic listening provided  • questions encouraged  • questions answered  • reassurance provided  • thoughts/feelings acknowledged  Goal: Readiness for Transition of Care  Outcome: Ongoing, Progressing     Problem: Heart Failure Comorbidity  Goal: Maintenance of Heart Failure Symptom Control  Intervention: Maintain Heart Failure-Management Strategies  Recent Flowsheet Documentation  Taken 3/7/2022 1401 by Edy Tripp RN  Medication Review/Management: medications reviewed  Taken 3/7/2022 1200 by Edy Tripp RN  Medication Review/Management: medications reviewed  Taken 3/7/2022 1002 by Edy Tripp RN  Medication Review/Management: medications reviewed  Taken 3/7/2022 0805 by Edy Tripp RN  Medication Review/Management: medications reviewed     Problem: Hypertension Comorbidity  Goal: Blood Pressure in Desired Range  Intervention: Maintain Hypertension-Management Strategies  Recent Flowsheet Documentation  Taken 3/7/2022 1401 by Edy Tripp RN  Medication Review/Management: medications reviewed  Taken 3/7/2022 1200 by Edy Tripp RN  Syncope Management: position changed slowly  Medication  Review/Management: medications reviewed  Taken 3/7/2022 1002 by Edy Tripp RN  Medication Review/Management: medications reviewed  Taken 3/7/2022 0805 by Edy Tripp RN  Syncope Management: position changed slowly  Medication Review/Management: medications reviewed     Problem: Asthma Comorbidity  Goal: Maintenance of Asthma Control  Outcome: Ongoing, Progressing  Intervention: Maintain Asthma Symptom Control  Recent Flowsheet Documentation  Taken 3/7/2022 1401 by Edy Tripp RN  Medication Review/Management: medications reviewed  Taken 3/7/2022 1200 by Edy Tripp RN  Medication Review/Management: medications reviewed  Taken 3/7/2022 1002 by Edy Tripp RN  Medication Review/Management: medications reviewed  Taken 3/7/2022 0805 by Edy Tripp RN  Medication Review/Management: medications reviewed     Problem: Hypertension Comorbidity  Goal: Blood Pressure in Desired Range  Outcome: Ongoing, Progressing  Intervention: Maintain Blood Pressure Management  Recent Flowsheet Documentation  Taken 3/7/2022 1401 by Edy Tripp RN  Medication Review/Management: medications reviewed  Taken 3/7/2022 1200 by Edy Tripp RN  Syncope Management: position changed slowly  Medication Review/Management: medications reviewed  Taken 3/7/2022 1002 by Edy Tripp RN  Medication Review/Management: medications reviewed  Taken 3/7/2022 0805 by Edy Tripp RN  Syncope Management: position changed slowly  Medication Review/Management: medications reviewed     Problem: Skin Injury Risk Increased  Goal: Skin Health and Integrity  Intervention: Optimize Skin Protection  Recent Flowsheet Documentation  Taken 3/7/2022 1200 by Edy Tripp RN  Pressure Reduction Techniques:  • frequent weight shift encouraged  • positioned off wounds  • pressure points protected  • weight shift assistance provided  Pressure Reduction Devices:  • positioning supports utilized  •  pressure-redistributing mattress utilized  Skin Protection:  • adhesive use limited  • incontinence pads utilized  • skin-to-device areas padded  • tubing/devices free from skin contact  Taken 3/7/2022 0805 by Edy Tripp RN  Pressure Reduction Techniques:  • frequent weight shift encouraged  • positioned off wounds  • pressure points protected  • weight shift assistance provided  Pressure Reduction Devices:  • positioning supports utilized  • pressure-redistributing mattress utilized  Skin Protection:  • adhesive use limited  • incontinence pads utilized  • skin-to-device areas padded  • tubing/devices free from skin contact     Problem: Skin Injury Risk Increased  Goal: Skin Health and Integrity  Outcome: Ongoing, Progressing  Intervention: Optimize Skin Protection  Recent Flowsheet Documentation  Taken 3/7/2022 1200 by Edy Tripp RN  Pressure Reduction Techniques:  • frequent weight shift encouraged  • positioned off wounds  • pressure points protected  • weight shift assistance provided  Pressure Reduction Devices:  • positioning supports utilized  • pressure-redistributing mattress utilized  Skin Protection:  • adhesive use limited  • incontinence pads utilized  • skin-to-device areas padded  • tubing/devices free from skin contact  Taken 3/7/2022 0805 by Edy Tripp RN  Pressure Reduction Techniques:  • frequent weight shift encouraged  • positioned off wounds  • pressure points protected  • weight shift assistance provided  Pressure Reduction Devices:  • positioning supports utilized  • pressure-redistributing mattress utilized  Skin Protection:  • adhesive use limited  • incontinence pads utilized  • skin-to-device areas padded  • tubing/devices free from skin contact     Problem: Fall Injury Risk  Goal: Absence of Fall and Fall-Related Injury  Intervention: Identify and Manage Contributors to Fall Injury Risk  Recent Flowsheet Documentation  Taken 3/7/2022 1401 by Edy Tripp  RN  Medication Review/Management: medications reviewed  Taken 3/7/2022 1200 by Edy Tripp RN  Medication Review/Management: medications reviewed  Taken 3/7/2022 1002 by Edy Tripp RN  Medication Review/Management: medications reviewed  Taken 3/7/2022 0805 by Edy Tripp RN  Medication Review/Management: medications reviewed  Intervention: Promote Injury-Free Environment  Recent Flowsheet Documentation  Taken 3/7/2022 1401 by Edy Tripp RN  Safety Promotion/Fall Prevention:  • safety round/check completed  • room organization consistent  • nonskid shoes/slippers when out of bed  • fall prevention program maintained  • clutter free environment maintained  • assistive device/personal items within reach  Taken 3/7/2022 1200 by Edy Tripp RN  Safety Promotion/Fall Prevention:  • safety round/check completed  • room organization consistent  • nonskid shoes/slippers when out of bed  • lighting adjusted  • fall prevention program maintained  • clutter free environment maintained  • activity supervised  • assistive device/personal items within reach  Taken 3/7/2022 1002 by Edy Tripp RN  Safety Promotion/Fall Prevention:  • safety round/check completed  • room organization consistent  • nonskid shoes/slippers when out of bed  • fall prevention program maintained  • clutter free environment maintained  • assistive device/personal items within reach  Taken 3/7/2022 0805 by Edy Tripp RN  Safety Promotion/Fall Prevention:  • room organization consistent  • safety round/check completed  • nonskid shoes/slippers when out of bed  • lighting adjusted  • fall prevention program maintained  • clutter free environment maintained  • assistive device/personal items within reach  • activity supervised     Problem: Fall Injury Risk  Goal: Absence of Fall and Fall-Related Injury  Outcome: Ongoing, Progressing  Intervention: Identify and Manage Contributors  Recent Flowsheet  Documentation  Taken 3/7/2022 1401 by Edy Tripp RN  Medication Review/Management: medications reviewed  Taken 3/7/2022 1200 by Edy Tripp RN  Medication Review/Management: medications reviewed  Taken 3/7/2022 1002 by Edy Tripp RN  Medication Review/Management: medications reviewed  Taken 3/7/2022 0805 by Edy Tripp RN  Medication Review/Management: medications reviewed  Intervention: Promote Injury-Free Environment  Recent Flowsheet Documentation  Taken 3/7/2022 1401 by Edy Tripp RN  Safety Promotion/Fall Prevention:  • safety round/check completed  • room organization consistent  • nonskid shoes/slippers when out of bed  • fall prevention program maintained  • clutter free environment maintained  • assistive device/personal items within reach  Taken 3/7/2022 1200 by Edy Tripp RN  Safety Promotion/Fall Prevention:  • safety round/check completed  • room organization consistent  • nonskid shoes/slippers when out of bed  • lighting adjusted  • fall prevention program maintained  • clutter free environment maintained  • activity supervised  • assistive device/personal items within reach  Taken 3/7/2022 1002 by Edy Tripp RN  Safety Promotion/Fall Prevention:  • safety round/check completed  • room organization consistent  • nonskid shoes/slippers when out of bed  • fall prevention program maintained  • clutter free environment maintained  • assistive device/personal items within reach  Taken 3/7/2022 0805 by Edy Tripp RN  Safety Promotion/Fall Prevention:  • room organization consistent  • safety round/check completed  • nonskid shoes/slippers when out of bed  • lighting adjusted  • fall prevention program maintained  • clutter free environment maintained  • assistive device/personal items within reach  • activity supervised  Goal: Absence of Fall and Fall-Related Injury  Outcome: Ongoing, Progressing  Intervention: Identify and Manage Contributors  Recent  Flowsheet Documentation  Taken 3/7/2022 1401 by Edy Tripp RN  Medication Review/Management: medications reviewed  Taken 3/7/2022 1200 by Edy Tirpp RN  Medication Review/Management: medications reviewed  Taken 3/7/2022 1002 by Edy Tripp RN  Medication Review/Management: medications reviewed  Taken 3/7/2022 0805 by Edy Tripp RN  Medication Review/Management: medications reviewed  Intervention: Promote Injury-Free Environment  Recent Flowsheet Documentation  Taken 3/7/2022 1401 by Edy Tripp RN  Safety Promotion/Fall Prevention:  • safety round/check completed  • room organization consistent  • nonskid shoes/slippers when out of bed  • fall prevention program maintained  • clutter free environment maintained  • assistive device/personal items within reach  Taken 3/7/2022 1200 by Edy Tripp RN  Safety Promotion/Fall Prevention:  • safety round/check completed  • room organization consistent  • nonskid shoes/slippers when out of bed  • lighting adjusted  • fall prevention program maintained  • clutter free environment maintained  • activity supervised  • assistive device/personal items within reach  Taken 3/7/2022 1002 by Edy Tripp RN  Safety Promotion/Fall Prevention:  • safety round/check completed  • room organization consistent  • nonskid shoes/slippers when out of bed  • fall prevention program maintained  • clutter free environment maintained  • assistive device/personal items within reach  Taken 3/7/2022 0805 by Edy Tripp RN  Safety Promotion/Fall Prevention:  • room organization consistent  • safety round/check completed  • nonskid shoes/slippers when out of bed  • lighting adjusted  • fall prevention program maintained  • clutter free environment maintained  • assistive device/personal items within reach  • activity supervised  Goal: Absence of Fall and Fall-Related Injury  Outcome: Ongoing, Progressing  Intervention: Identify and Manage  Contributors  Recent Flowsheet Documentation  Taken 3/7/2022 1401 by Edy Tripp RN  Medication Review/Management: medications reviewed  Taken 3/7/2022 1200 by Edy Tripp RN  Medication Review/Management: medications reviewed  Taken 3/7/2022 1002 by Edy Tripp RN  Medication Review/Management: medications reviewed  Taken 3/7/2022 0805 by Edy Tripp RN  Medication Review/Management: medications reviewed  Intervention: Promote Injury-Free Environment  Recent Flowsheet Documentation  Taken 3/7/2022 1401 by Edy Tripp RN  Safety Promotion/Fall Prevention:  • safety round/check completed  • room organization consistent  • nonskid shoes/slippers when out of bed  • fall prevention program maintained  • clutter free environment maintained  • assistive device/personal items within reach  Taken 3/7/2022 1200 by Edy Tripp RN  Safety Promotion/Fall Prevention:  • safety round/check completed  • room organization consistent  • nonskid shoes/slippers when out of bed  • lighting adjusted  • fall prevention program maintained  • clutter free environment maintained  • activity supervised  • assistive device/personal items within reach  Taken 3/7/2022 1002 by Edy Tripp RN  Safety Promotion/Fall Prevention:  • safety round/check completed  • room organization consistent  • nonskid shoes/slippers when out of bed  • fall prevention program maintained  • clutter free environment maintained  • assistive device/personal items within reach  Taken 3/7/2022 0805 by Edy Tripp RN  Safety Promotion/Fall Prevention:  • room organization consistent  • safety round/check completed  • nonskid shoes/slippers when out of bed  • lighting adjusted  • fall prevention program maintained  • clutter free environment maintained  • assistive device/personal items within reach  • activity supervised  Goal: Absence of Fall and Fall-Related Injury  Outcome: Ongoing, Progressing  Intervention: Identify and  Manage Contributors  Recent Flowsheet Documentation  Taken 3/7/2022 1401 by Edy Tripp RN  Medication Review/Management: medications reviewed  Taken 3/7/2022 1200 by Edy Tripp RN  Medication Review/Management: medications reviewed  Taken 3/7/2022 1002 by Edy Tripp RN  Medication Review/Management: medications reviewed  Taken 3/7/2022 0805 by Edy Tripp RN  Medication Review/Management: medications reviewed  Intervention: Promote Injury-Free Environment  Recent Flowsheet Documentation  Taken 3/7/2022 1401 by Edy Tripp RN  Safety Promotion/Fall Prevention:  • safety round/check completed  • room organization consistent  • nonskid shoes/slippers when out of bed  • fall prevention program maintained  • clutter free environment maintained  • assistive device/personal items within reach  Taken 3/7/2022 1200 by Edy Tripp RN  Safety Promotion/Fall Prevention:  • safety round/check completed  • room organization consistent  • nonskid shoes/slippers when out of bed  • lighting adjusted  • fall prevention program maintained  • clutter free environment maintained  • activity supervised  • assistive device/personal items within reach    Problem: Pain Acute  Goal: Acceptable Pain Control and Functional Ability  Outcome: Ongoing, Progressing  Intervention: Prevent or Manage Pain  Recent Flowsheet Documentation  Taken 3/7/2022 1401 by Edy Tripp RN  Medication Review/Management: medications reviewed  Taken 3/7/2022 1200 by Edy Tripp RN  Sensory Stimulation Regulation:  • auditory stimulation minimized  • care clustered  • lighting decreased  Bowel Elimination Promotion: commode/bedpan at bedside  Medication Review/Management: medications reviewed  Taken 3/7/2022 1002 by Edy Tripp RN  Medication Review/Management: medications reviewed  Taken 3/7/2022 0805 by Edy Tripp RN  Sensory Stimulation Regulation:  • auditory stimulation minimized  • care clustered  •  lighting decreased  Bowel Elimination Promotion: commode/bedpan at bedside  Medication Review/Management: medications reviewed  Intervention: Develop Pain Management Plan  Recent Flowsheet Documentation  Taken 3/7/2022 1200 by Edy Tripp RN  Pain Management Interventions: see MAR  Intervention: Optimize Psychosocial Wellbeing  Recent Flowsheet Documentation  Taken 3/7/2022 1200 by Edy Tripp RN  Supportive Measures: active listening utilized  Diversional Activities: television  Taken 3/7/2022 0805 by Edy Tripp RN  Supportive Measures: active listening utilized  Diversional Activities: television  Goal: Acceptable Pain Control and Functional Ability  Outcome: Ongoing, Progressing  Intervention: Prevent or Manage Pain  Recent Flowsheet Documentation  Taken 3/7/2022 1401 by Edy Tripp RN  Medication Review/Management: medications reviewed  Taken 3/7/2022 1200 by Edy Tripp RN  Sensory Stimulation Regulation:  • auditory stimulation minimized  • care clustered  • lighting decreased  Bowel Elimination Promotion: commode/bedpan at bedside  Medication Review/Management: medications reviewed  Taken 3/7/2022 1002 by Edy Tripp RN  Medication Review/Management: medications reviewed  Taken 3/7/2022 0805 by Edy Tripp RN  Sensory Stimulation Regulation:  • auditory stimulation minimized  • care clustered  • lighting decreased  Bowel Elimination Promotion: commode/bedpan at bedside  Medication Review/Management: medications reviewed  Intervention: Develop Pain Management Plan  Recent Flowsheet Documentation  Taken 3/7/2022 1200 by Edy Tripp RN  Pain Management Interventions: see MAR  Intervention: Optimize Psychosocial Wellbeing  Recent Flowsheet Documentation  Taken 3/7/2022 1200 by Edy Tripp RN  Supportive Measures: active listening utilized  Diversional Activities: television  Taken 3/7/2022 0805 by Edy Tripp RN  Supportive Measures: active listening  utilized  Diversional Activities: television     Problem: Dysrhythmia  Goal: Normalized Cardiac Rhythm  Outcome: Ongoing, Progressing  Intervention: Monitor and Manage Cardiac Rhythm Effect  Recent Flowsheet Documentation  Taken 3/7/2022 1200 by Edy Tripp RN  VTE Prevention/Management:  • bilateral  • sequential compression devices off  Taken 3/7/2022 0805 by Edy Tripp RN  VTE Prevention/Management:  • bilateral  • sequential compression devices on

## 2022-03-07 NOTE — CONSULTS
Patient does not qualify for Cardiac Rehab due to no recent MI, coronary intervention, OHS, or EF less than 35%.

## 2022-03-07 NOTE — PLAN OF CARE
Problem: Adult Inpatient Plan of Care  Goal: Plan of Care Review  Outcome: Ongoing, Progressing  Goal: Patient-Specific Goal (Individualized)  Outcome: Ongoing, Progressing  Goal: Absence of Hospital-Acquired Illness or Injury  Outcome: Ongoing, Progressing  Intervention: Identify and Manage Fall Risk  Recent Flowsheet Documentation  Taken 3/7/2022 0200 by Emerita Jimenez RN  Safety Promotion/Fall Prevention:   clutter free environment maintained   fall prevention program maintained   lighting adjusted   nonskid shoes/slippers when out of bed   safety round/check completed   room organization consistent  Taken 3/6/2022 2200 by Emerita Jimenez RN  Safety Promotion/Fall Prevention:   clutter free environment maintained   fall prevention program maintained   lighting adjusted   nonskid shoes/slippers when out of bed   room organization consistent   safety round/check completed  Taken 3/6/2022 2000 by Emerita Jimenez RN  Safety Promotion/Fall Prevention:   clutter free environment maintained   fall prevention program maintained   lighting adjusted   nonskid shoes/slippers when out of bed   room organization consistent   safety round/check completed  Intervention: Prevent Skin Injury  Recent Flowsheet Documentation  Taken 3/7/2022 0000 by Emerita Jimenez RN  Skin Protection:   adhesive use limited   skin-to-skin areas padded   tubing/devices free from skin contact  Taken 3/6/2022 2000 by Emerita Jimenez RN  Skin Protection:   adhesive use limited   skin-to-skin areas padded   tubing/devices free from skin contact  Intervention: Prevent and Manage VTE (Venous Thromboembolism) Risk  Recent Flowsheet Documentation  Taken 3/7/2022 0000 by Emerita Jimenez RN  VTE Prevention/Management:   bilateral   sequential compression devices on  Taken 3/6/2022 2000 by Emerita Jimenez RN  VTE Prevention/Management:   bilateral   sequential compression devices on  Range of Motion: active ROM (range of motion) encouraged  Intervention:  Prevent Infection  Recent Flowsheet Documentation  Taken 3/7/2022 0200 by Emerita Jimenez RN  Infection Prevention:   environmental surveillance performed   hand hygiene promoted   personal protective equipment utilized   rest/sleep promoted   single patient room provided  Taken 3/6/2022 2200 by Emerita Jimenez RN  Infection Prevention:   environmental surveillance performed   hand hygiene promoted   personal protective equipment utilized   rest/sleep promoted   single patient room provided  Taken 3/6/2022 2000 by Emerita Jimenez RN  Infection Prevention:   environmental surveillance performed   hand hygiene promoted   personal protective equipment utilized   rest/sleep promoted   single patient room provided  Goal: Optimal Comfort and Wellbeing  Outcome: Ongoing, Progressing  Intervention: Monitor Pain and Promote Comfort  Recent Flowsheet Documentation  Taken 3/6/2022 2132 by Emerita Jimenez RN  Pain Management Interventions: see MAR  Intervention: Provide Person-Centered Care  Recent Flowsheet Documentation  Taken 3/7/2022 0000 by Emerita Jimenez RN  Trust Relationship/Rapport:   care explained   emotional support provided   empathic listening provided   questions answered   questions encouraged   reassurance provided   thoughts/feelings acknowledged  Taken 3/6/2022 2000 by Emerita Jimenez RN  Trust Relationship/Rapport:   care explained   emotional support provided   empathic listening provided   questions answered   questions encouraged   reassurance provided   thoughts/feelings acknowledged  Goal: Readiness for Transition of Care  Outcome: Ongoing, Progressing     Problem: Pain Acute  Goal: Acceptable Pain Control and Functional Ability  Outcome: Ongoing, Progressing  Intervention: Prevent or Manage Pain  Recent Flowsheet Documentation  Taken 3/7/2022 0000 by Emerita Jimenez RN  Sensory Stimulation Regulation: television on  Sleep/Rest Enhancement: room darkened  Taken 3/6/2022 2000 by Emerita Jimenez  RN  Sensory Stimulation Regulation: television on  Sleep/Rest Enhancement: room darkened  Medication Review/Management: medications reviewed  Intervention: Develop Pain Management Plan  Recent Flowsheet Documentation  Taken 3/6/2022 2132 by Emerita Jimenez RN  Pain Management Interventions: see MAR  Intervention: Optimize Psychosocial Wellbeing  Recent Flowsheet Documentation  Taken 3/7/2022 0000 by Emerita Jimenez RN  Supportive Measures:   active listening utilized   self-care encouraged   verbalization of feelings encouraged  Taken 3/6/2022 2000 by Emerita Jimenez RN  Supportive Measures:   active listening utilized   self-care encouraged   verbalization of feelings encouraged  Diversional Activities: television  Goal: Acceptable Pain Control and Functional Ability  Outcome: Ongoing, Progressing  Intervention: Prevent or Manage Pain  Recent Flowsheet Documentation  Taken 3/7/2022 0000 by Emerita Jimenez RN  Sensory Stimulation Regulation: television on  Sleep/Rest Enhancement: room darkened  Taken 3/6/2022 2000 by Emerita Jimenez RN  Sensory Stimulation Regulation: television on  Sleep/Rest Enhancement: room darkened  Medication Review/Management: medications reviewed  Intervention: Develop Pain Management Plan  Recent Flowsheet Documentation  Taken 3/6/2022 2132 by Emerita Jimenez RN  Pain Management Interventions: see MAR  Intervention: Optimize Psychosocial Wellbeing  Recent Flowsheet Documentation  Taken 3/7/2022 0000 by Emerita Jimenez RN  Supportive Measures:   active listening utilized   self-care encouraged   verbalization of feelings encouraged  Taken 3/6/2022 2000 by Emertia Jimenez RN  Supportive Measures:   active listening utilized   self-care encouraged   verbalization of feelings encouraged  Diversional Activities: television     Problem: Asthma Comorbidity  Goal: Maintenance of Asthma Control  Outcome: Ongoing, Progressing  Intervention: Maintain Asthma Symptom Control  Recent Flowsheet  Documentation  Taken 3/6/2022 2000 by Emerita Jimenez RN  Medication Review/Management: medications reviewed     Problem: Hypertension Comorbidity  Goal: Blood Pressure in Desired Range  Outcome: Ongoing, Progressing  Intervention: Maintain Blood Pressure Management  Recent Flowsheet Documentation  Taken 3/7/2022 0000 by Emerita Jimenez RN  Syncope Management: position changed slowly  Taken 3/6/2022 2000 by Emerita Jimenez RN  Syncope Management: position changed slowly  Medication Review/Management: medications reviewed     Problem: Skin Injury Risk Increased  Goal: Skin Health and Integrity  Outcome: Ongoing, Progressing  Intervention: Optimize Skin Protection  Recent Flowsheet Documentation  Taken 3/7/2022 0000 by Emerita Jimenez RN  Pressure Reduction Techniques:   frequent weight shift encouraged   weight shift assistance provided  Pressure Reduction Devices: pressure-redistributing mattress utilized  Skin Protection:   adhesive use limited   skin-to-skin areas padded   tubing/devices free from skin contact  Taken 3/6/2022 2000 by Emerita Jimenez RN  Pressure Reduction Techniques:   frequent weight shift encouraged   weight shift assistance provided  Pressure Reduction Devices: pressure-redistributing mattress utilized  Skin Protection:   adhesive use limited   skin-to-skin areas padded   tubing/devices free from skin contact     Problem: Fall Injury Risk  Goal: Absence of Fall and Fall-Related Injury  Outcome: Ongoing, Progressing  Intervention: Identify and Manage Contributors  Recent Flowsheet Documentation  Taken 3/6/2022 2000 by Emerita Jimenez RN  Medication Review/Management: medications reviewed  Intervention: Promote Injury-Free Environment  Recent Flowsheet Documentation  Taken 3/7/2022 0200 by Emerita Jimenez RN  Safety Promotion/Fall Prevention:   clutter free environment maintained   fall prevention program maintained   lighting adjusted   nonskid shoes/slippers when out of bed   safety round/check  completed   room organization consistent  Taken 3/6/2022 2200 by Emerita Jimenez RN  Safety Promotion/Fall Prevention:   clutter free environment maintained   fall prevention program maintained   lighting adjusted   nonskid shoes/slippers when out of bed   room organization consistent   safety round/check completed  Taken 3/6/2022 2000 by Emerita Jimenez RN  Safety Promotion/Fall Prevention:   clutter free environment maintained   fall prevention program maintained   lighting adjusted   nonskid shoes/slippers when out of bed   room organization consistent   safety round/check completed  Goal: Absence of Fall and Fall-Related Injury  Outcome: Ongoing, Progressing  Intervention: Identify and Manage Contributors  Recent Flowsheet Documentation  Taken 3/6/2022 2000 by Emerita Jimenez, RN  Medication Review/Management: medications reviewed  Intervention: Promote Injury-Free Environment  Recent Flowsheet Documentation  Taken 3/7/2022 0200 by Emerita Jimenez RN  Safety Promotion/Fall Prevention:   clutter free environment maintained   fall prevention program maintained   lighting adjusted   nonskid shoes/slippers when out of bed   safety round/check completed   room organization consistent  Taken 3/6/2022 2200 by Emerita Jimenez RN  Safety Promotion/Fall Prevention:   clutter free environment maintained   fall prevention program maintained   lighting adjusted   nonskid shoes/slippers when out of bed   room organization consistent   safety round/check completed  Taken 3/6/2022 2000 by Emerita Jimenez RN  Safety Promotion/Fall Prevention:   clutter free environment maintained   fall prevention program maintained   lighting adjusted   nonskid shoes/slippers when out of bed   room organization consistent   safety round/check completed  Goal: Absence of Fall and Fall-Related Injury  Outcome: Ongoing, Progressing  Intervention: Identify and Manage Contributors  Recent Flowsheet Documentation  Taken 3/6/2022 2000 by Barbara  BRENDAN Felder  Medication Review/Management: medications reviewed  Intervention: Promote Injury-Free Environment  Recent Flowsheet Documentation  Taken 3/7/2022 0200 by Emerita Jimenez RN  Safety Promotion/Fall Prevention:   clutter free environment maintained   fall prevention program maintained   lighting adjusted   nonskid shoes/slippers when out of bed   safety round/check completed   room organization consistent  Taken 3/6/2022 2200 by Emerita Jimenez RN  Safety Promotion/Fall Prevention:   clutter free environment maintained   fall prevention program maintained   lighting adjusted   nonskid shoes/slippers when out of bed   room organization consistent   safety round/check completed  Taken 3/6/2022 2000 by Emerita Jimenez RN  Safety Promotion/Fall Prevention:   clutter free environment maintained   fall prevention program maintained   lighting adjusted   nonskid shoes/slippers when out of bed   room organization consistent   safety round/check completed  Goal: Absence of Fall and Fall-Related Injury  Outcome: Ongoing, Progressing  Intervention: Identify and Manage Contributors  Recent Flowsheet Documentation  Taken 3/6/2022 2000 by Emerita Jimenez RN  Medication Review/Management: medications reviewed  Intervention: Promote Injury-Free Environment  Recent Flowsheet Documentation  Taken 3/7/2022 0200 by Emerita Jimenez RN  Safety Promotion/Fall Prevention:   clutter free environment maintained   fall prevention program maintained   lighting adjusted   nonskid shoes/slippers when out of bed   safety round/check completed   room organization consistent  Taken 3/6/2022 2200 by Emerita Jimenez RN  Safety Promotion/Fall Prevention:   clutter free environment maintained   fall prevention program maintained   lighting adjusted   nonskid shoes/slippers when out of bed   room organization consistent   safety round/check completed  Taken 3/6/2022 2000 by Emerita Jimenez, RN  Safety Promotion/Fall Prevention:   clutter free  environment maintained   fall prevention program maintained   lighting adjusted   nonskid shoes/slippers when out of bed   room organization consistent   safety round/check completed     Problem: Dysrhythmia  Goal: Normalized Cardiac Rhythm  Outcome: Ongoing, Progressing  Intervention: Monitor and Manage Cardiac Rhythm Effect  Recent Flowsheet Documentation  Taken 3/7/2022 0000 by Emerita Jimenez, RN  VTE Prevention/Management:   bilateral   sequential compression devices on  Taken 3/6/2022 2000 by Emerita Jimenez RN  VTE Prevention/Management:   bilateral   sequential compression devices on   Goal Outcome Evaluation:   Pt. Is alert and orientated. Heart rate remains in a.fib but is mostly at a controlled rate with Amiodrone gtt cont. Is cont. Of b/b no edema is noted. Did complain of some nausea and pain earlier that was relieved with zofran and hydrocodone meds.. pt. Remains on fall and skin precaution and scattered bruises is noted. PICC line is in place and site is clear.

## 2022-03-07 NOTE — DISCHARGE PLACEMENT REQUEST
"Catalina Moreno (81 y.o. Female)             Date of Birth   1940    Social Security Number       Address   [address] [place]    Home Phone   [phone]    MRN   [id number]       [place]   None    Marital Status                               Admission Date   3/4/22    Admission Type   Emergency    Admitting Provider   Dom Murray MD    Attending Provider   Dom Murray MD    Department, Room/Bed   [facility] NEURO HEART, 259/1       Discharge Date       Discharge Disposition       Discharge Destination                               Attending Provider: Dom Murray MD    Allergies: Baclofen, Codeine, Ibuprofen, Methocarbamol, Naproxen, Tizanidine Hcl, Tolmetin    Isolation: None   Infection: None   Code Status: CPR   Advance Care Planning Activity    Ht: 165.1 cm (65\")   Wt: 74.2 kg (163 lb 9.3 oz)    Admission Cmt: None   Principal Problem: Atrial fibrillation with RVR (HCC) [I48.91]                 Active Insurance as of 3/4/2022     Primary Coverage     Payor Plan Insurance Group Employer/Plan Group    [employer] MEDICARE REPLACEMENT AARP[facility]UHC [facility] [id number]     Payor Plan Address Payor Plan Phone Number Payor Plan Fax Number Effective Dates    [address] [phone]  2/1/2022 - None Entered    [facility] [id number]       Subscriber Name Subscriber Birth Date Member ID       Catalina Moreno 1940 [id number]                 Emergency Contacts      (Rel.) Home Phone Work Phone Mobile Phone    KRISTINE DEAN (Son) [phone] -- [phone]              "

## 2022-03-07 NOTE — PROGRESS NOTES
Referring Provider: Hospitalist    Reason for follow-up: Palpitations     Patient Care Team:  Anayeli Costa APRN as PCP - General (Family Medicine)  Barrett Evans MD as Consulting Physician (Cardiology)    Subjective .  Patient is doing well without any symptoms    Objective  In bed comfortably     Review of Systems   Constitutional: Negative for fever and malaise/fatigue.   Cardiovascular: Negative for chest pain, dyspnea on exertion and palpitations.   Respiratory: Negative for cough and shortness of breath.    Skin: Negative for rash.   Gastrointestinal: Negative for abdominal pain, nausea and vomiting.   Neurological: Negative for focal weakness and headaches.   All other systems reviewed and are negative.      Baclofen, Codeine, Ibuprofen, Methocarbamol, Naproxen, Tizanidine hcl, and Tolmetin    Scheduled Meds:atorvastatin, 10 mg, Oral, Nightly  digoxin, 125 mcg, Oral, Daily  enoxaparin, 1 mg/kg, Subcutaneous, Q12H  famotidine, 20 mg, Oral, Daily  furosemide, 40 mg, Intravenous, Daily  gabapentin, 600 mg, Oral, 4x Daily  levothyroxine, 88 mcg, Oral, Q AM  metoprolol tartrate, 25 mg, Oral, Q12H  pantoprazole, 40 mg, Oral, Daily With Lunch  potassium chloride, 40 mEq, Oral, Daily  rOPINIRole, 0.25 mg, Oral, Nightly  sertraline, 50 mg, Oral, Nightly  sildenafil, 20 mg, Oral, BID  sodium chloride, 10 mL, Intravenous, Q12H  sodium chloride, 10 mL, Intravenous, Q12H  sodium chloride, 10 mL, Intravenous, Q12H  cyanocobalamin, 1,000 mcg, Oral, Daily      Continuous Infusions:amiodarone, 1 mg/min, Last Rate: 1 mg/min (03/07/22 1407)      PRN Meds:.cyclobenzaprine  •  HYDROcodone-acetaminophen  •  [START ON 3/8/2022] HYDROcodone-acetaminophen  •  influenza vaccine  •  melatonin  •  ondansetron **OR** ondansetron  •  sodium chloride  •  sodium chloride  •  sodium chloride  •  sodium chloride  •  sodium chloride        VITAL SIGNS  Vitals:    03/07/22 1002 03/07/22 1243 03/07/22 1423 03/07/22 1736   BP:  "90/65 94/51 117/81 102/51   BP Location: Left arm  Left arm Left arm   Patient Position: Sitting  Sitting Lying   Pulse: 86 90 92 101   Resp: 23  23 12   Temp: 97.3 °F (36.3 °C)  98 °F (36.7 °C) 96.9 °F (36.1 °C)   TempSrc: Oral  Oral Axillary   SpO2: 90%      Weight:       Height:           Flowsheet Rows    Flowsheet Row First Filed Value   Admission Height 165.1 cm (65\") Documented at 03/04/2022 1217   Admission Weight 71.7 kg (158 lb) Documented at 03/04/2022 1217           TELEMETRY: Atrial fibrillation    Physical Exam:  Constitutional:       Appearance: Well-developed.   Eyes:      General: No scleral icterus.     Conjunctiva/sclera: Conjunctivae normal.      Pupils: Pupils are equal, round, and reactive to light.   HENT:      Head: Normocephalic and atraumatic.   Neck:      Vascular: No carotid bruit or JVD.   Pulmonary:      Effort: Pulmonary effort is normal.      Breath sounds: Normal breath sounds. No wheezing. No rales.   Cardiovascular:      Normal rate. Regular rhythm.   Pulses:     Intact distal pulses.   Abdominal:      General: Bowel sounds are normal.      Palpations: Abdomen is soft.   Musculoskeletal: Normal range of motion.      Cervical back: Normal range of motion and neck supple. Skin:     General: Skin is warm and dry.      Findings: No rash.   Neurological:      Mental Status: Alert.      Comments: No focal deficits          Results Review:   I reviewed the patient's new clinical results.  Lab Results (last 24 hours)     Procedure Component Value Units Date/Time    Basic Metabolic Panel [[id number]]  (Normal) Collected: 03/07/22 0459    Specimen: Blood Updated: 03/07/22 0613     Glucose 94 mg/dL      BUN 16 mg/dL      Creatinine 0.91 mg/dL      Sodium 139 mmol/L      Potassium 4.0 mmol/L      Chloride 100 mmol/L      CO2 27.0 mmol/L      Calcium 9.1 mg/dL      BUN/Creatinine Ratio 17.6     Anion Gap 12.0 mmol/L      eGFR 63.9 mL/min/1.73      Comment: National Kidney Foundation and American " Society of Nephrology (ASN) Task Force recommended calculation based on the Chronic Kidney Disease Epidemiology Collaboration (CKD-EPI) equation refit without adjustment for race.       Narrative:      GFR Normal >60  Chronic Kidney Disease <60  Kidney Failure <15      Magnesium [[id number]]  (Normal) Collected: 03/07/22 0459    Specimen: Blood Updated: 03/07/22 0613     Magnesium 2.1 mg/dL     CBC & Differential [[id number]]  (Abnormal) Collected: 03/07/22 0300    Specimen: Blood Updated: 03/07/22 0547    Narrative:      The following orders were created for panel order CBC & Differential.  Procedure                               Abnormality         Status                     ---------                               -----------         ------                     CBC Auto Differential[[id number]]        Abnormal            Final result               Scan Slide[[id number]]                                                                    Please view results for these tests on the individual orders.    CBC Auto Differential [[id number]]  (Abnormal) Collected: 03/07/22 0459    Specimen: Blood Updated: 03/07/22 0547     WBC 5.10 10*3/mm3      RBC 4.26 10*6/mm3      Hemoglobin 10.7 g/dL      Hematocrit 33.8 %      MCV 79.3 fL      MCH 25.1 pg      MCHC 31.6 g/dL      RDW 19.3 %      RDW-SD 53.4 fl      MPV 8.5 fL      Platelets 217 10*3/mm3      Neutrophil % 47.0 %      Lymphocyte % 37.1 %      Monocyte % 12.0 %      Eosinophil % 2.6 %      Basophil % 1.3 %      Neutrophils, Absolute 2.40 10*3/mm3      Lymphocytes, Absolute 1.90 10*3/mm3      Monocytes, Absolute 0.60 10*3/mm3      Eosinophils, Absolute 0.10 10*3/mm3      Basophils, Absolute 0.10 10*3/mm3      nRBC 0.1 /100 WBC           Imaging Results (Last 24 Hours)     ** No results found for the last 24 hours. **          EKG      I personally viewed and interpreted the patient's EKG/Telemetry data:    ECHOCARDIOGRAM:    STRESS MYOVIEW:    CARDIAC  CATHETERIZATION:    OTHER:         Assessment/Plan     Principal Problem:    Atrial fibrillation with RVR ([facility])  Active Problems:    Primary fibromyalgia syndrome    Lumbar radiculopathy    Acute on chronic diastolic congestive heart failure ([facility])    Stage 2 chronic kidney disease    Restless legs syndrome    Hypothyroidism, unspecified    Pulmonary hypertension, unspecified ([facility])       Patient presented with shortness of breath and had atrial fibrillation with rapid response  Patient is currently on digoxin Toprol and is on amiodarone  Patient was on Eliquis at home which will be restarted  Patient had a congestive heart with EF of 45 to 50% and is stable  Patient also has chronic renal insufficiency.  Patient cannot afford Eliquis and hence we will start her on Coumadin  Pharmacy will dose her when everybody is okay.  INR should be kept around 2.0-3.0   I discussed the patients findings and my recommendations with patient and nurse    Flash Gonzalez MD  03/07/22  17:46 EST

## 2022-03-07 NOTE — PLAN OF CARE
Goal Outcome Evaluation:  82 y/o F presents with c/o back pain, chest pain and dyspnea. Pt has PMH of A fib, arthritis and multiple falls leading to orthopedic related injuries and surgeries. On this admission, pt is diagnosed with A fib with RVR and acute on CHF. Pt lives alone in a SSH with no VLADIMIR. Pt is generally independent with all ADLs using a RW. This date, pt completed all bed mobility with mod I, transfers with SBA and ambulation with CGA using a RW. Mild instability and right sided veering noted when ambulating along with increased in dizziness with all positional changes and activity without vitals deviation. At this time, recommending IP rehab but pending progress to decrease risk of falls and ensure patient safety in D/C environment.

## 2022-03-07 NOTE — CONSULTS
Pt pleasant and thankful for visit. Pt still lives alone and told  about her independence and home life. She is wanting to return home and feels frustrated that her three sons do not intuitively help their mother. She is appreciative of the staff and the care and concern that they show her. She asked for healing prayer and was thankful for the 's time and prayer. She accepted the offer for follow up visits.  will continue to follow for support. No other needs at this time.

## 2022-03-07 NOTE — PLAN OF CARE
Goal Outcome Evaluation:  Plan of Care Reviewed With: patient           Outcome Evaluation: Pt is a 80 y/o F presents with c/o back pain, chest pain and dyspnea. Dx with A-fib, RVR, and acute CHF. MRI (-). PMHx significant for A fib, arthritis and multiple falls leading to orthopedic related injuries and surgeries. Pt reports stopping medication (Eliquis) due to significant cost increase.  At baseline, patient is independent, driving, and cares for dog at home. She has a son who is nearby to provide some support.  Pt moves well, requiring CGA - Min A for all ADLs and functional transfers. She demos mild weakness, decreased activity tolerance, and impaired balance. Pt acnkowledges decline in cognition over several weeks. Scores 19, 30 on MoCA 8.1 this date (see below for details), with questionable Mild Cognitive Impairment. Pt is far below stated baseline, and will require IP rehab at discharge.'    [place] Version:   Visiospatial / Executive- 3/5  Naming - 3/3  Attention - 4/6  Language - 3/3  Abstraction - 1/2  Delayed Recall - 0/5  Orientation - 5/6  Total: 19/30, indicating questionable Mild Cognitive Impairment

## 2022-03-07 NOTE — PROGRESS NOTES
[facility] Medicine Services Daily Progress Note    Patient Name: Catalina Moreno  : 1940  MRN: [id number]  Primary Care Physician:  Anayeli Costa APRN  Date of admission: 3/4/2022      Subjective      Chief Complaint: Atrial fibrillation palpitations      Patient Reports being worried about affording Eliquis.  She states they want $100 a month and she is not able to afford this.  Denies any other complaints.  She appears somewhat pale but her hemoglobin is above 10.    ROS negative except as above.      Objective      Vitals:   Temp:  [96.6 °F (35.9 °C)-98.4 °F (36.9 °C)] 98 °F (36.7 °C)  Heart Rate:  [] 92  Resp:  [11-23] 23  BP: ()/(42-81) 117/81  Flow (L/min):  [2] 2    Physical Exam  Vitals reviewed.   Constitutional:       Comments: Somewhat dusky appearing   Eyes:      Pupils: Pupils are equal, round, and reactive to light.   Cardiovascular:      Rate and Rhythm: Tachycardia present. Rhythm irregular.   Pulmonary:      Effort: Pulmonary effort is normal.   Abdominal:      General: Abdomen is flat.   Musculoskeletal:         General: Normal range of motion.      Cervical back: Normal range of motion.   Skin:     General: Skin is warm.      Capillary Refill: Capillary refill takes less than 2 seconds.   Neurological:      General: No focal deficit present.      Mental Status: She is alert.   Psychiatric:         Mood and Affect: Mood normal.             Result Review    Result Review:  I have personally reviewed the results from the time of this admission to 3/7/2022 15:37 EST and agree with these findings:  [x]  Laboratory  []  Microbiology  []  Radiology  []  EKG/Telemetry   []  Cardiology/Vascular   []  Pathology  []  Old records  []  Other:  Most notable findings include: hb > 10          Assessment/Plan      Brief Patient Summary:  Catalina Moreno is a 81 y.o. female who presents with A. fib RVR      atorvastatin, 10 mg, Oral, Nightly  digoxin, 125 mcg,  Oral, Daily  enoxaparin, 1 mg/kg, Subcutaneous, Q12H  famotidine, 20 mg, Oral, Daily  furosemide, 40 mg, Intravenous, Daily  gabapentin, 600 mg, Oral, 4x Daily  levothyroxine, 88 mcg, Oral, Q AM  metoprolol tartrate, 25 mg, Oral, Q12H  pantoprazole, 40 mg, Oral, Daily With Lunch  potassium chloride, 40 mEq, Oral, Daily  rOPINIRole, 0.25 mg, Oral, Nightly  sertraline, 50 mg, Oral, Nightly  sildenafil, 20 mg, Oral, BID  sodium chloride, 10 mL, Intravenous, Q12H  sodium chloride, 10 mL, Intravenous, Q12H  sodium chloride, 10 mL, Intravenous, Q12H  cyanocobalamin, 1,000 mcg, Oral, Daily       amiodarone, 1 mg/min, Last Rate: 1 mg/min (03/07/22 1407)         Active Hospital Problems:  Active Hospital Problems    Diagnosis    • **Atrial fibrillation with RVR ([facility])    • Pulmonary hypertension, unspecified ([facility])    • Restless legs syndrome    • Hypothyroidism, unspecified    • Stage 2 chronic kidney disease    • Acute on chronic diastolic congestive heart failure (HCC)    • Lumbar radiculopathy    • Primary fibromyalgia syndrome      Plan:      A. fib with RVR  -EKG shows A. fib with RVR at a rate of 158 without obvious acute ST changes and a QTC of 495 ms on admission  -Continue iv amiodarone switched to p.o. per cardiology when ready  - therapeutic dose lovenox sc.  Patient has difficulty affording Eliquis co-pay  -Continue beta-blocker  -TSH: 3.530  -Troponin less than 0.010, trend  -Continue telemetry  -Lovenox twice daily for now, consider alternative p.o. anticoagulation or Case management consult given patient's inability to afford Eliquis on an outpatient basis, patient likely will need Coumadin  - consulted cardiology service.  Appreciate their assistance     Acute on chronic heart failure systolic in nature, 2D echo revealed ejection fraction around 40 to 45%  -proBNP: 8657.0 with pulmonary edema noted on CT of chest as well as CT of abdomen  -Echocardiogram from 7/26/2021 showed an EF of 60% with normal left  ventricular systolic function with moderate aortic valve regurgitation  -2D echo finding reviewed revealing ejection fraction around 40 to 45%  -40 mg Lasix IV daily, can change to p.o. in a day or 2  -2 g sodium diet  -Daily weight  -Continue beta-blocker     Anemia  -Hemoglobin: 11.1 with a decreased MCV  -Check iron profile  -Monitor while admitted  Monitor hemoglobin closely     History of CKD stage II  -Serum creatinine: 0.76 with an EGFR of 79.3  -Monitor while admitted     Hypothyroidism  -Levothyroxine     RLS  -Requip     Chronic pain/fibromyalgia  -Norco and Lyrica     Depression/anxiety  -Zoloft     DVT prophylaxis:  Medical DVT prophylaxis orders are present.        CODE STATUS:    Code Status (Patient has no pulse and is not breathing): CPR (Attempt to Resuscitate)  Medical Interventions (Patient has pulse or is breathing): Full Support        Disposition:  I expect patient to be discharged as per clinical course.     This patient has been examined wearing appropriate Personal Protective Equipment and discussed with hospital infection control department.  03/07/22      Electronically signed by Dom Murray MD, 03/07/22, 15:37 EST.  [facility] Hospitalist Team

## 2022-03-08 ENCOUNTER — APPOINTMENT (OUTPATIENT)
Dept: GENERAL RADIOLOGY | Facility: HOSPITAL | Age: 82
End: 2022-03-08

## 2022-03-08 LAB
ANION GAP SERPL CALCULATED.3IONS-SCNC: 9 MMOL/L (ref 5–15)
BASOPHILS # BLD MANUAL: 0.15 10*3/MM3 (ref 0–0.2)
BASOPHILS NFR BLD MANUAL: 3 % (ref 0–1.5)
BUN SERPL-MCNC: 20 MG/DL (ref 8–23)
BUN/CREAT SERPL: 19.8 (ref 7–25)
CALCIUM SPEC-SCNC: 8.9 MG/DL (ref 8.6–10.5)
CHLORIDE SERPL-SCNC: 102 MMOL/L (ref 98–107)
CO2 SERPL-SCNC: 29 MMOL/L (ref 22–29)
CREAT SERPL-MCNC: 1.01 MG/DL (ref 0.57–1)
CRP SERPL-MCNC: 2.1 MG/DL (ref 0–0.5)
DEPRECATED RDW RBC AUTO: 53.4 FL (ref 37–54)
EGFRCR SERPLBLD CKD-EPI 2021: 56.4 ML/MIN/1.73
EOSINOPHIL # BLD MANUAL: 0.1 10*3/MM3 (ref 0–0.4)
EOSINOPHIL NFR BLD MANUAL: 2 % (ref 0.3–6.2)
ERYTHROCYTE [DISTWIDTH] IN BLOOD BY AUTOMATED COUNT: 19.4 % (ref 12.3–15.4)
GLUCOSE SERPL-MCNC: 112 MG/DL (ref 65–99)
HCT VFR BLD AUTO: 32 % (ref 34–46.6)
HGB BLD-MCNC: 10.2 G/DL (ref 12–15.9)
LYMPHOCYTES # BLD MANUAL: 1.84 10*3/MM3 (ref 0.7–3.1)
LYMPHOCYTES NFR BLD MANUAL: 9 % (ref 5–12)
MAGNESIUM SERPL-MCNC: 2 MG/DL (ref 1.6–2.4)
MCH RBC QN AUTO: 25.1 PG (ref 26.6–33)
MCHC RBC AUTO-ENTMCNC: 31.8 G/DL (ref 31.5–35.7)
MCV RBC AUTO: 78.9 FL (ref 79–97)
MONOCYTES # BLD: 0.46 10*3/MM3 (ref 0.1–0.9)
NEUTROPHILS # BLD AUTO: 2.55 10*3/MM3 (ref 1.7–7)
NEUTROPHILS NFR BLD MANUAL: 50 % (ref 42.7–76)
NT-PROBNP SERPL-MCNC: 1120 PG/ML (ref 0–1800)
PHOSPHATE SERPL-MCNC: 3.4 MG/DL (ref 2.5–4.5)
PLAT MORPH BLD: NORMAL
PLATELET # BLD AUTO: 193 10*3/MM3 (ref 140–450)
PMV BLD AUTO: 8.2 FL (ref 6–12)
POTASSIUM SERPL-SCNC: 4.6 MMOL/L (ref 3.5–5.2)
QT INTERVAL: 363 MS
RBC # BLD AUTO: 4.05 10*6/MM3 (ref 3.77–5.28)
RBC MORPH BLD: NORMAL
SCAN SLIDE: NORMAL
SODIUM SERPL-SCNC: 140 MMOL/L (ref 136–145)
VARIANT LYMPHS NFR BLD MANUAL: 36 % (ref 19.6–45.3)
WBC MORPH BLD: NORMAL
WBC NRBC COR # BLD: 5.1 10*3/MM3 (ref 3.4–10.8)

## 2022-03-08 PROCEDURE — 25010000002 ONDANSETRON PER 1 MG: Performed by: PHYSICIAN ASSISTANT

## 2022-03-08 PROCEDURE — 85007 BL SMEAR W/DIFF WBC COUNT: CPT | Performed by: INTERNAL MEDICINE

## 2022-03-08 PROCEDURE — 80048 BASIC METABOLIC PNL TOTAL CA: CPT | Performed by: INTERNAL MEDICINE

## 2022-03-08 PROCEDURE — 83880 ASSAY OF NATRIURETIC PEPTIDE: CPT | Performed by: INTERNAL MEDICINE

## 2022-03-08 PROCEDURE — 97116 GAIT TRAINING THERAPY: CPT

## 2022-03-08 PROCEDURE — 25010000002 ENOXAPARIN PER 10 MG: Performed by: PHYSICIAN ASSISTANT

## 2022-03-08 PROCEDURE — 99233 SBSQ HOSP IP/OBS HIGH 50: CPT | Performed by: INTERNAL MEDICINE

## 2022-03-08 PROCEDURE — 97110 THERAPEUTIC EXERCISES: CPT

## 2022-03-08 PROCEDURE — 84100 ASSAY OF PHOSPHORUS: CPT | Performed by: INTERNAL MEDICINE

## 2022-03-08 PROCEDURE — 25010000002 METOCLOPRAMIDE PER 10 MG: Performed by: INTERNAL MEDICINE

## 2022-03-08 PROCEDURE — 99232 SBSQ HOSP IP/OBS MODERATE 35: CPT | Performed by: INTERNAL MEDICINE

## 2022-03-08 PROCEDURE — 86140 C-REACTIVE PROTEIN: CPT | Performed by: INTERNAL MEDICINE

## 2022-03-08 PROCEDURE — 93010 ELECTROCARDIOGRAM REPORT: CPT | Performed by: INTERNAL MEDICINE

## 2022-03-08 PROCEDURE — 93005 ELECTROCARDIOGRAM TRACING: CPT | Performed by: INTERNAL MEDICINE

## 2022-03-08 PROCEDURE — 83735 ASSAY OF MAGNESIUM: CPT | Performed by: INTERNAL MEDICINE

## 2022-03-08 PROCEDURE — 74018 RADEX ABDOMEN 1 VIEW: CPT

## 2022-03-08 PROCEDURE — 85025 COMPLETE CBC W/AUTO DIFF WBC: CPT | Performed by: INTERNAL MEDICINE

## 2022-03-08 RX ORDER — ZOLPIDEM TARTRATE 5 MG/1
5 TABLET ORAL NIGHTLY PRN
Status: DISCONTINUED | OUTPATIENT
Start: 2022-03-08 | End: 2022-03-10 | Stop reason: HOSPADM

## 2022-03-08 RX ORDER — METOCLOPRAMIDE HYDROCHLORIDE 5 MG/ML
10 INJECTION INTRAMUSCULAR; INTRAVENOUS
Status: DISCONTINUED | OUTPATIENT
Start: 2022-03-08 | End: 2022-03-10 | Stop reason: HOSPADM

## 2022-03-08 RX ORDER — BISACODYL 10 MG
10 SUPPOSITORY, RECTAL RECTAL DAILY
Status: DISCONTINUED | OUTPATIENT
Start: 2022-03-08 | End: 2022-03-10 | Stop reason: HOSPADM

## 2022-03-08 RX ADMIN — DIGOXIN 125 MCG: 125 TABLET ORAL at 11:31

## 2022-03-08 RX ADMIN — CYANOCOBALAMIN TAB 1000 MCG 1000 MCG: 1000 TAB at 09:26

## 2022-03-08 RX ADMIN — AMIODARONE HYDROCHLORIDE 200 MG: 200 TABLET ORAL at 09:29

## 2022-03-08 RX ADMIN — LEVOTHYROXINE SODIUM 88 MCG: 0.09 TABLET ORAL at 04:37

## 2022-03-08 RX ADMIN — Medication 10 ML: at 09:28

## 2022-03-08 RX ADMIN — GABAPENTIN 600 MG: 600 TABLET, FILM COATED ORAL at 20:24

## 2022-03-08 RX ADMIN — Medication 10 ML: at 20:27

## 2022-03-08 RX ADMIN — METOPROLOL TARTRATE 25 MG: 25 TABLET, FILM COATED ORAL at 20:25

## 2022-03-08 RX ADMIN — POTASSIUM CHLORIDE 40 MEQ: 1500 TABLET, EXTENDED RELEASE ORAL at 09:26

## 2022-03-08 RX ADMIN — GABAPENTIN 600 MG: 600 TABLET, FILM COATED ORAL at 17:35

## 2022-03-08 RX ADMIN — SERTRALINE 50 MG: 50 TABLET, FILM COATED ORAL at 20:31

## 2022-03-08 RX ADMIN — METOPROLOL TARTRATE 25 MG: 25 TABLET, FILM COATED ORAL at 09:59

## 2022-03-08 RX ADMIN — HYDROCODONE BITARTRATE AND ACETAMINOPHEN 1 TABLET: 7.5; 325 TABLET ORAL at 20:25

## 2022-03-08 RX ADMIN — AMIODARONE HYDROCHLORIDE 200 MG: 200 TABLET ORAL at 17:35

## 2022-03-08 RX ADMIN — METOCLOPRAMIDE HYDROCHLORIDE 10 MG: 5 INJECTION INTRAMUSCULAR; INTRAVENOUS at 20:24

## 2022-03-08 RX ADMIN — ATORVASTATIN CALCIUM 10 MG: 10 TABLET, FILM COATED ORAL at 20:25

## 2022-03-08 RX ADMIN — PANTOPRAZOLE SODIUM 40 MG: 40 TABLET, DELAYED RELEASE ORAL at 11:18

## 2022-03-08 RX ADMIN — Medication 5 MG: at 20:24

## 2022-03-08 RX ADMIN — ONDANSETRON 4 MG: 2 INJECTION INTRAMUSCULAR; INTRAVENOUS at 09:59

## 2022-03-08 RX ADMIN — FAMOTIDINE 20 MG: 20 TABLET, FILM COATED ORAL at 09:26

## 2022-03-08 RX ADMIN — Medication 10 ML: at 09:27

## 2022-03-08 RX ADMIN — ENOXAPARIN SODIUM 70 MG: 100 INJECTION SUBCUTANEOUS at 17:36

## 2022-03-08 RX ADMIN — Medication 10 ML: at 20:26

## 2022-03-08 RX ADMIN — ROPINIROLE HYDROCHLORIDE 0.25 MG: 0.25 TABLET, FILM COATED ORAL at 20:24

## 2022-03-08 RX ADMIN — GABAPENTIN 600 MG: 600 TABLET, FILM COATED ORAL at 11:18

## 2022-03-08 RX ADMIN — CYCLOBENZAPRINE 5 MG: 10 TABLET, FILM COATED ORAL at 20:24

## 2022-03-08 RX ADMIN — ENOXAPARIN SODIUM 70 MG: 100 INJECTION SUBCUTANEOUS at 04:37

## 2022-03-08 RX ADMIN — METOCLOPRAMIDE HYDROCHLORIDE 10 MG: 5 INJECTION INTRAMUSCULAR; INTRAVENOUS at 17:35

## 2022-03-08 RX ADMIN — GABAPENTIN 600 MG: 600 TABLET, FILM COATED ORAL at 09:26

## 2022-03-08 NOTE — THERAPY TREATMENT NOTE
Subjective: Pt agreeable to therapeutic plan of care. Pt reports no dizziness this date. Pt states she feels weak. Pt told me her feet usually hurt to touch, but not today.    Objective:     Bed mobility - SBA supine to sit  Transfers - CGA Sit to stand x 2  Ambulation - 100 feet CGA w/Rwx.    Exercise: Seated APS, Heel slides, Abd/add, Glute sets x 10 B  Balance exercise: Standing w/ narrow TIRSO w/o rwx, 30secs x 3. Pt presents with lateral and ant/post sway. Pt LOB x 1 w/ Min- A to correct.     No c/o dizziness this date.  /49 Sitting EOB  /77 post amb  HR got up to 120 post amb, but quickly returned to 80.    Pain: 0 VAS  Education: Provided education on importance of mobility and skilled verbal / tactile cueing throughout intervention.     Assessment: Catalina Moreno presents with functional mobility impairments which indicate the need for skilled intervention. Pt presents with  balance deficits when unsupported, making her a high falls risk and unsafe to go home and live alone. Pt also has a history of falls. Will progress standing balance exercises next treatment. Tolerating session today without incident. Will continue to follow and progress as tolerated.     Plan/Recommendations:   Pt would benefit from Inpatient Rehabilitation placement at discharge from facility and requires no DME at discharge.   Pt desires Inpatient Rehabilitation placement at discharge. Pt does not feel safe to return home due to weakness. Pt cooperative; agreeable to therapeutic recommendations and plan of care.     Basic Mobility 6-click:  Rollin = Total, A lot = 2, A little = 3; 4 = None  Supine>Sit:   1 = Total, A lot = 2, A little = 3; 4 = None   Sit>Stand with arms:  1 = Total, A lot = 2, A little = 3; 4 = None  Bed>Chair:   1 = Total, A lot = 2, A little = 3; 4 = None  Ambulate in room:  1 = Total, A lot = 2, A little = 3; 4 = None  3-5 Steps with railin = Total, A lot = 2, A little = 3; 4 =  None  Score: 17    Modified [place]: 4 = Moderately severe disability (Unable to attend to own bodily needs without assistance, and unable to walk unassisted)     Post-Tx Position: Up in Chair, Alarms activated and Call light and personal items within reach  PPE: gloves, surgical mask, eyewear protection

## 2022-03-08 NOTE — PROGRESS NOTES
Referring Provider: Hospitalist    Reason for follow-up: Palpitations     Patient Care Team:  Anayeli Costa APRN as PCP - General (Family Medicine)  Barrett Evans MD as Consulting Physician (Cardiology)    Subjective .  Patient is doing well without any symptoms    Objective  In bed comfortably     Review of Systems   Constitutional: Negative for fever and malaise/fatigue.   Cardiovascular: Negative for chest pain, dyspnea on exertion and palpitations.   Respiratory: Negative for cough and shortness of breath.    Skin: Negative for rash.   Gastrointestinal: Negative for abdominal pain, nausea and vomiting.   Neurological: Negative for focal weakness and headaches.   All other systems reviewed and are negative.      Baclofen, Codeine, Ibuprofen, Methocarbamol, Naproxen, Tizanidine hcl, and Tolmetin    Scheduled Meds:amiodarone, 200 mg, Oral, BID With Meals  atorvastatin, 10 mg, Oral, Nightly  bisacodyl, 10 mg, Rectal, Daily  digoxin, 125 mcg, Oral, Daily  enoxaparin, 1 mg/kg, Subcutaneous, Q12H  famotidine, 20 mg, Oral, Daily  furosemide, 40 mg, Intravenous, Daily  gabapentin, 600 mg, Oral, 4x Daily  levothyroxine, 88 mcg, Oral, Q AM  metoclopramide, 10 mg, Intravenous, 4x Daily AC & at Bedtime  metoprolol tartrate, 25 mg, Oral, Q12H  pantoprazole, 40 mg, Oral, Daily With Lunch  potassium chloride, 40 mEq, Oral, Daily  rOPINIRole, 0.25 mg, Oral, Nightly  sertraline, 50 mg, Oral, Nightly  sildenafil, 20 mg, Oral, BID  sodium chloride, 10 mL, Intravenous, Q12H  sodium chloride, 10 mL, Intravenous, Q12H  sodium chloride, 10 mL, Intravenous, Q12H  cyanocobalamin, 1,000 mcg, Oral, Daily      Continuous Infusions:amiodarone, 1 mg/min, Last Rate: 1 mg/min (03/07/22 1407)      PRN Meds:.cyclobenzaprine  •  HYDROcodone-acetaminophen  •  HYDROcodone-acetaminophen  •  influenza vaccine  •  melatonin  •  ondansetron **OR** ondansetron  •  sodium chloride  •  sodium chloride  •  sodium chloride  •  sodium  "chloride  •  sodium chloride        VITAL SIGNS  Vitals:    03/08/22 1131 03/08/22 1423 03/08/22 1735 03/08/22 1757   BP: 102/47 108/53 145/73 145/73   BP Location:  Left arm  Left arm   Patient Position:  Lying  Lying   Pulse: (!) 137 83 99 (!) 123   Resp:  15  16   Temp:  98.2 °F (36.8 °C)  98.1 °F (36.7 °C)   TempSrc:  Oral  Oral   SpO2:       Weight:       Height:           Flowsheet Rows    Flowsheet Row First Filed Value   Admission Height 165.1 cm (65\") Documented at 03/04/2022 1217   Admission Weight 71.7 kg (158 lb) Documented at 03/04/2022 1217           TELEMETRY: Atrial fibrillation    Physical Exam:  Constitutional:       Appearance: Well-developed.   Eyes:      General: No scleral icterus.     Conjunctiva/sclera: Conjunctivae normal.      Pupils: Pupils are equal, round, and reactive to light.   HENT:      Head: Normocephalic and atraumatic.   Neck:      Vascular: No carotid bruit or JVD.   Pulmonary:      Effort: Pulmonary effort is normal.      Breath sounds: Normal breath sounds. No wheezing. No rales.   Cardiovascular:      Normal rate. Regular rhythm.   Pulses:     Intact distal pulses.   Abdominal:      General: Bowel sounds are normal.      Palpations: Abdomen is soft.   Musculoskeletal: Normal range of motion.      Cervical back: Normal range of motion and neck supple. Skin:     General: Skin is warm and dry.      Findings: No rash.   Neurological:      Mental Status: Alert.      Comments: No focal deficits          Results Review:   I reviewed the patient's new clinical results.  Lab Results (last 24 hours)     Procedure Component Value Units Date/Time    Manual Differential [[id number]]  (Abnormal) Collected: 03/08/22 0440    Specimen: Blood Updated: 03/08/22 0622     Neutrophil % 50.0 %      Lymphocyte % 36.0 %      Monocyte % 9.0 %      Eosinophil % 2.0 %      Basophil % 3.0 %      Neutrophils Absolute 2.55 10*3/mm3      Lymphocytes Absolute 1.84 10*3/mm3      Monocytes Absolute 0.46 " 10*3/mm3      Eosinophils Absolute 0.10 10*3/mm3      Basophils Absolute 0.15 10*3/mm3      RBC Morphology Normal     WBC Morphology Normal     Platelet Morphology Normal    CBC & Differential [[id number]]  (Abnormal) Collected: 03/08/22 0440    Specimen: Blood Updated: 03/08/22 0622    Narrative:      The following orders were created for panel order CBC & Differential.  Procedure                               Abnormality         Status                     ---------                               -----------         ------                     CBC Auto Differential[[id number]]        Abnormal            Final result               Scan Slide[[id number]]                                       Final result                 Please view results for these tests on the individual orders.    Scan Slide [[id number]] Collected: 03/08/22 0440    Specimen: Blood Updated: 03/08/22 0622     Scan Slide --     Comment: See Manual Differential Results       CBC Auto Differential [[id number]]  (Abnormal) Collected: 03/08/22 0440    Specimen: Blood Updated: 03/08/22 0622     WBC 5.10 10*3/mm3      RBC 4.05 10*6/mm3      Hemoglobin 10.2 g/dL      Hematocrit 32.0 %      MCV 78.9 fL      MCH 25.1 pg      MCHC 31.8 g/dL      RDW 19.4 %      RDW-SD 53.4 fl      MPV 8.2 fL      Platelets 193 10*3/mm3     Narrative:      The previously reported component NRBC is no longer being reported. Previous result was 0.2 /100 WBC (Reference Range: 0.0-0.2 /100 WBC) on 3/8/2022 at 0517 EST.    Basic Metabolic Panel [[id number]]  (Abnormal) Collected: 03/08/22 0440    Specimen: Blood Updated: 03/08/22 0548     Glucose 112 mg/dL      BUN 20 mg/dL      Creatinine 1.01 mg/dL      Sodium 140 mmol/L      Potassium 4.6 mmol/L      Comment: Slight hemolysis detected by analyzer. Results may be affected.        Chloride 102 mmol/L      CO2 29.0 mmol/L      Calcium 8.9 mg/dL      BUN/Creatinine Ratio 19.8     Anion Gap 9.0 mmol/L      eGFR 56.4 mL/min/1.73      Comment:  National Kidney Foundation and American Society of Nephrology (ASN) Task Force recommended calculation based on the Chronic Kidney Disease Epidemiology Collaboration (CKD-EPI) equation refit without adjustment for race.       Narrative:      GFR Normal >60  Chronic Kidney Disease <60  Kidney Failure <15      Phosphorus [[id number]]  (Normal) Collected: 03/08/22 0440    Specimen: Blood Updated: 03/08/22 0548     Phosphorus 3.4 mg/dL     C-reactive Protein [[id number]]  (Abnormal) Collected: 03/08/22 0440    Specimen: Blood Updated: 03/08/22 0548     C-Reactive Protein 2.10 mg/dL     Magnesium [[id number]]  (Normal) Collected: 03/08/22 0440    Specimen: Blood Updated: 03/08/22 0548     Magnesium 2.0 mg/dL     BNP [[id number]]  (Normal) Collected: 03/08/22 0440    Specimen: Blood Updated: 03/08/22 0536     proBNP 1,120.0 pg/mL     Narrative:      Among patients with dyspnea, NT-proBNP is highly sensitive for the detection of acute congestive heart failure. In addition NT-proBNP of <300 pg/ml effectively rules out acute congestive heart failure with 99% negative predictive value.    Results may be falsely decreased if patient taking Biotin.            Imaging Results (Last 24 Hours)     Procedure Component Value Units Date/Time    XR Abdomen KUB [[id number]] Collected: 03/08/22 1517     Updated: 03/08/22 1520    Narrative:      DATE OF EXAM:  3/8/2022 3:13 PM     PROCEDURE:  XR ABDOMEN KUB-     INDICATIONS:  n/v abd pain; I48.91-Unspecified atrial fibrillation; R07.9-Chest pain,  unspecified; I50.33-Acute on chronic diastolic (congestive) heart  failure; M54.50-Low back pain, unspecified; G89.29-Other chronic pain     COMPARISON:  KUB 4/20/2015. CT abdomen and pelvis 3/4/2022.     TECHNIQUE:   Single radiographic view of the abdomen was obtained.        FINDINGS:  Moderate gaseous distention of the stomach. No significantly dilated  large or small bowel loops. Mild air is seen within the large and small  bowel.  Calcifications overlying the left iliac crest without correspond  to injection granulomas seen to better advantage on prior CT imaging.     No radiopaque urinary tract stones are identified. Degenerative facet  changes are seen in the lower lumbar spine. Cholecystectomy. Lung bases  are clear.        Impression:         1. Moderate gaseous distention of the stomach. Otherwise, no acute  findings within the abdomen.     Electronically Signed By-Suzanne King MD On:3/8/2022 3:18 PM  This report was finalized on [id number] by  Suzanne King MD.          EKG      I personally viewed and interpreted the patient's EKG/Telemetry data:    ECHOCARDIOGRAM:    STRESS MYOVIEW:    CARDIAC CATHETERIZATION:    OTHER:         Assessment/Plan     Principal Problem:    Atrial fibrillation with RVR (HCC)  Active Problems:    Primary fibromyalgia syndrome    Lumbar radiculopathy    Acute on chronic diastolic congestive heart failure (HCC)    Stage 2 chronic kidney disease    Restless legs syndrome    Hypothyroidism, unspecified    Pulmonary hypertension, unspecified (HCC)       Patient presented with shortness of breath and had atrial fibrillation with rapid response  Patient is currently on digoxin Toprol and is on amiodarone  Patient was on Eliquis at home which will be restarted  Patient had a congestive heart with EF of 45 to 50% and is stable  Patient also has chronic renal insufficiency.  Patient is on Lovenox and can be switched to either warfarin or Eliquis based on her renal function   I discussed the patients findings and my recommendations with patient and nurse    Flash Gonzalez MD  03/08/22  17:58 EST

## 2022-03-08 NOTE — PROGRESS NOTES
[facility] Medicine Services Daily Progress Note    Patient Name: Catalina Moreno  : 1940  MRN: [id number]  Primary Care Physician:  Anayeli Costa APRN  Date of admission: 3/4/2022      Subjective      Chief Complaint: Atrial fibrillation palpitations        Patient Reports being nauseous all day today.  Unable to eat.  Abdominal pain also noted.  Abdominal x-ray ordered.  EKG ordered to assess QTC for Phenergan/Reglan addition.     ROS negative except as above.       Objective      Vitals:   Temp:  [96.9 °F (36.1 °C)-98.1 °F (36.7 °C)] 98.1 °F (36.7 °C)  Heart Rate:  [] 137  Resp:  [9-23] 9  BP: ()/(41-81) 102/47    Physical Exam  Vitals reviewed.   Constitutional:       Comments: Somewhat dusky appearing   Eyes:      Pupils: Pupils are equal, round, and reactive to light.   Cardiovascular:      Rate and Rhythm: Tachycardia present. Rhythm irregular.   Pulmonary:      Effort: Pulmonary effort is normal.   Abdominal:      General: Abdomen is flat.   Musculoskeletal:         General: Normal range of motion.      Cervical back: Normal range of motion.   Skin:     General: Skin is warm.      Capillary Refill: Capillary refill takes less than 2 seconds.   Neurological:      General: No focal deficit present.      Mental Status: She is alert.   Psychiatric:         Mood and Affect: Mood normal.        Result Review    Result Review:  I have personally reviewed the results from the time of this admission to 3/8/2022 14:02 EST and agree with these findings:  [x]  Laboratory  []  Microbiology  []  Radiology  []  EKG/Telemetry   []  Cardiology/Vascular   []  Pathology  []  Old records  []  Other:  Most notable findings include: Renal function stable.  Hemoglobin above 10             Assessment/Plan       Brief Patient Summary:  Catalina Moreno is a 81 y.o. female who presents with A. fib RVR        atorvastatin, 10 mg, Oral, Nightly  digoxin, 125 mcg, Oral,  Daily  enoxaparin, 1 mg/kg, Subcutaneous, Q12H  famotidine, 20 mg, Oral, Daily  furosemide, 40 mg, Intravenous, Daily  gabapentin, 600 mg, Oral, 4x Daily  levothyroxine, 88 mcg, Oral, Q AM  metoprolol tartrate, 25 mg, Oral, Q12H  pantoprazole, 40 mg, Oral, Daily With Lunch  potassium chloride, 40 mEq, Oral, Daily  rOPINIRole, 0.25 mg, Oral, Nightly  sertraline, 50 mg, Oral, Nightly  sildenafil, 20 mg, Oral, BID  sodium chloride, 10 mL, Intravenous, Q12H  sodium chloride, 10 mL, Intravenous, Q12H  sodium chloride, 10 mL, Intravenous, Q12H  cyanocobalamin, 1,000 mcg, Oral, Daily        amiodarone, 1 mg/min, Last Rate: 1 mg/min (03/07/22 1407)           Active Hospital Problems:       Active Hospital Problems     Diagnosis     • **Atrial fibrillation with RVR ([facility])     • Pulmonary hypertension, unspecified ([facility])     • Restless legs syndrome     • Hypothyroidism, unspecified     • Stage 2 chronic kidney disease     • Acute on chronic diastolic congestive heart failure (HCC)     • Lumbar radiculopathy     • Primary fibromyalgia syndrome        Plan:      A. fib with RVR  -EKG shows A. fib with RVR at a rate of 158 without obvious acute ST changes and a QTC of 495 ms on admission  -Continue iv amiodarone switched to p.o. per cardiology  - therapeutic dose lovenox sc.    Patient should be able to afford $45 co-pay for Eliquis  -Continue beta-blocker  -TSH: 3.530  -Troponin less than 0.010, trend  -Continue telemetry  -Lovenox twice daily for now, consider alternative p.o. anticoagulation or Case management consult given patient's inability to afford Eliquis if it is more than $45 per month  - consulted cardiology service.  Appreciate their assistance     Acute on chronic heart failure systolic in nature, 2D echo revealed ejection fraction around 40 to 45%  -proBNP: 8657.0 with pulmonary edema noted on CT of chest as well as CT of abdomen  -Echocardiogram from 7/26/2021 showed an EF of 60% with normal left ventricular  systolic function with moderate aortic valve regurgitation  -2D echo finding reviewed revealing ejection fraction around 40 to 45%  -40 mg Lasix IV daily, can change to p.o. in a day or 2  -2 g sodium diet  -Daily weight  -Continue beta-blocker     Anemia  -Hemoglobin: 10 and above thus far  Monitor hemoglobin closely     History of CKD stage II  -Serum creatinine: 0.76 with an EGFR of 79.3  -Monitor while admitted     Hypothyroidism  -Levothyroxine     RLS  -Requip     Chronic pain/fibromyalgia  -Norco and Lyrica     Depression/anxiety  -Zoloft     DVT prophylaxis:  Medical DVT prophylaxis orders are present.    Nausea vomiting abdominal pain abdominal x-ray ordered.  EKG repeat ordered for QTC check for Reglan and Phenergan       CODE STATUS:    Code Status (Patient has no pulse and is not breathing): CPR (Attempt to Resuscitate)  Medical Interventions (Patient has pulse or is breathing): Full Support        Disposition:  I expect patient to be discharged as per clinical course.     This patient has been examined wearing appropriate Personal Protective Equipment and discussed with hospital infection control department.  03/08/22      Electronically signed by Dom Murray MD, 03/08/22, 14:02 EST.  [facility]ist Team

## 2022-03-08 NOTE — CASE MANAGEMENT/SOCIAL WORK
Continued Stay Note  KADEEM Rios     Patient Name: Catalina Moreno  MRN: [id number]  Today's Date: 3/8/2022    Admit Date: 3/4/2022     Discharge Plan     Row Name 03/08/22 1238       Plan    Plan [facility] Accepted pending precert, ISABELLA per facility    Plan Comments Per Tawanna at [facility] patient is accepted and they are starting precert today. Attempted to call Son Karri to update and see if he can provide transportation for patient once precert is approved but no answer and mailbox was full. Also informed Patient of Eliquis co pay of $47 and she stated she can afford that cost.                Discharge Codes    No documentation.                     Irena Grier RN

## 2022-03-08 NOTE — CASE MANAGEMENT/SOCIAL WORK
Continued Stay Note   Gabriel     Patient Name: Catalina Moreno  MRN: [id number]  Today's Date: 3/8/2022    Admit Date: 3/4/2022     Discharge Plan     Row Name 03/08/22 0920       Plan    Plan referral to [facility] pending acceptance will need precert and PASRR    Plan Comments [place] is out of network sent to next choice of [place] liaison Tawanna notified.              Phone communication or documentation only - no physical contact with patient or family.        Irena Grier RN

## 2022-03-08 NOTE — CASE MANAGEMENT/SOCIAL WORK
Continued Stay Note   Gabriel     Patient Name: Catalina Moreno  MRN: [id number]  Today's Date: 3/8/2022    Admit Date: 3/4/2022     Discharge Plan     Row Name 03/08/22 1251       Plan    Plan Comments Recieved call back from Son Karri and he yelled at this CM to the point the phone call had to be ended but he  stated he will not be providing transportation at discharge for patient the hospital can figure it out and send her in a cab. Will contact  for potential assistance at [facility]. Per Tawanna at [facility] they do not have transportation currently.                                    Phone communication or documentation only - no physical contact with patient or family.        Irena Grier RN

## 2022-03-08 NOTE — PLAN OF CARE
Problem: Adult Inpatient Plan of Care  Goal: Absence of Hospital-Acquired Illness or Injury  Intervention: Identify and Manage Fall Risk  Recent Flowsheet Documentation  Taken 3/8/2022 0400 by Malou Velazquez RN  Safety Promotion/Fall Prevention:   assistive device/personal items within reach   nonskid shoes/slippers when out of bed   room organization consistent   safety round/check completed   lighting adjusted   fall prevention program maintained   clutter free environment maintained  Taken 3/8/2022 0000 by Malou Velazquez RN  Safety Promotion/Fall Prevention:   assistive device/personal items within reach   fall prevention program maintained   lighting adjusted   muscle strengthening facilitated   room organization consistent  Taken 3/7/2022 2200 by Malou Velazquez RN  Safety Promotion/Fall Prevention:   fall prevention program maintained   safety round/check completed   nonskid shoes/slippers when out of bed   lighting adjusted   assistive device/personal items within reach  Taken 3/7/2022 2000 by Malou Velazquez RN  Safety Promotion/Fall Prevention:   fall prevention program maintained   clutter free environment maintained   nonskid shoes/slippers when out of bed   room organization consistent   safety round/check completed   assistive device/personal items within reach  Intervention: Prevent Skin Injury  Recent Flowsheet Documentation  Taken 3/8/2022 0400 by Malou Velazquez RN  Body Position: supine  Skin Protection:   adhesive use limited   tubing/devices free from skin contact  Taken 3/8/2022 0000 by Malou Velazquez RN  Body Position:   right   side-lying  Skin Protection:   adhesive use limited   tubing/devices free from skin contact  Taken 3/7/2022 2000 by Malou Velazquez RN  Body Position:   left   tilted  Intervention: Prevent and Manage VTE (venous thromboembolism) Risk  Recent Flowsheet Documentation  Taken 3/8/2022 0400 by Malou Velazquez RN  VTE Prevention/Management:   bilateral    compression stockings off  Taken 3/8/2022 0000 by Malou Velazquez RN  VTE Prevention/Management:   bilateral   compression stockings off  Taken 3/7/2022 2000 by Malou Velazquez RN  VTE Prevention/Management:   bilateral   compression stockings on  Intervention: Prevent Infection  Recent Flowsheet Documentation  Taken 3/8/2022 0400 by Malou Velazquez RN  Infection Prevention:   rest/sleep promoted   single patient room provided   hand hygiene promoted   equipment surfaces disinfected  Taken 3/8/2022 0000 by Malou Velazquez RN  Infection Prevention:   rest/sleep promoted   single patient room provided  Taken 3/7/2022 2200 by Malou Velazquez RN  Infection Prevention:   rest/sleep promoted   hand hygiene promoted   visitors restricted/screened   single patient room provided  Taken 3/7/2022 2000 by Malou Velazquez RN  Infection Prevention:   hand hygiene promoted   single patient room provided   personal protective equipment utilized   rest/sleep promoted  Goal: Optimal Comfort and Wellbeing  Intervention: Provide Person-Centered Care  Recent Flowsheet Documentation  Taken 3/8/2022 0000 by Malou Velazqeuz RN  Trust Relationship/Rapport:   care explained   choices provided   questions answered  Taken 3/7/2022 2000 by Malou Velazquez RN  Trust Relationship/Rapport:   questions answered   reassurance provided   care explained   choices provided   thoughts/feelings acknowledged     Problem: Adult Inpatient Plan of Care  Goal: Absence of Hospital-Acquired Illness or Injury  Intervention: Identify and Manage Fall Risk  Recent Flowsheet Documentation  Taken 3/8/2022 0400 by Malou Velazquez RN  Safety Promotion/Fall Prevention:   assistive device/personal items within reach   nonskid shoes/slippers when out of bed   room organization consistent   safety round/check completed   lighting adjusted   fall prevention program maintained   clutter free environment maintained  Taken 3/8/2022 0000 by Malou Velazquez  RN  Safety Promotion/Fall Prevention:   assistive device/personal items within reach   fall prevention program maintained   lighting adjusted   muscle strengthening facilitated   room organization consistent  Taken 3/7/2022 2200 by Malou Velazquez RN  Safety Promotion/Fall Prevention:   fall prevention program maintained   safety round/check completed   nonskid shoes/slippers when out of bed   lighting adjusted   assistive device/personal items within reach  Taken 3/7/2022 2000 by Malou Velazquez RN  Safety Promotion/Fall Prevention:   fall prevention program maintained   clutter free environment maintained   nonskid shoes/slippers when out of bed   room organization consistent   safety round/check completed   assistive device/personal items within reach  Intervention: Prevent Skin Injury  Recent Flowsheet Documentation  Taken 3/8/2022 0400 by Malou Velazquez RN  Body Position: supine  Skin Protection:   adhesive use limited   tubing/devices free from skin contact  Taken 3/8/2022 0000 by Malou Velazquez RN  Body Position:   right   side-lying  Skin Protection:   adhesive use limited   tubing/devices free from skin contact  Taken 3/7/2022 2000 by Malou Velazquez RN  Body Position:   left   tilted  Intervention: Prevent and Manage VTE (Venous Thromboembolism) Risk  Recent Flowsheet Documentation  Taken 3/8/2022 0400 by Malou Velazquez RN  VTE Prevention/Management:   bilateral   compression stockings off  Range of Motion: active ROM (range of motion) encouraged  Taken 3/8/2022 0000 by Malou Velazquez RN  Activity Management: (bsc)   activity adjusted per tolerance   ambulated in room  VTE Prevention/Management:   bilateral   compression stockings off  Taken 3/7/2022 2000 by Malou Velazquez RN  VTE Prevention/Management:   bilateral   compression stockings on  Intervention: Prevent Infection  Recent Flowsheet Documentation  Taken 3/8/2022 0400 by Malou Velazquez RN  Infection Prevention:   rest/sleep  promoted   single patient room provided   hand hygiene promoted   equipment surfaces disinfected  Taken 3/8/2022 0000 by Malou Velazquez RN  Infection Prevention:   rest/sleep promoted   single patient room provided  Taken 3/7/2022 2200 by Malou Velazquez RN  Infection Prevention:   rest/sleep promoted   hand hygiene promoted   visitors restricted/screened   single patient room provided  Taken 3/7/2022 2000 by Malou Velazquez RN  Infection Prevention:   hand hygiene promoted   single patient room provided   personal protective equipment utilized   rest/sleep promoted  Goal: Optimal Comfort and Wellbeing  Intervention: Provide Person-Centered Care  Recent Flowsheet Documentation  Taken 3/8/2022 0000 by Malou Velazquez RN  Trust Relationship/Rapport:   care explained   choices provided   questions answered  Taken 3/7/2022 2000 by Malou Velazquez RN  Trust Relationship/Rapport:   questions answered   reassurance provided   care explained   choices provided   thoughts/feelings acknowledged     Problem: Heart Failure Comorbidity  Goal: Maintenance of Heart Failure Symptom Control  Intervention: Maintain Heart Failure-Management Strategies  Recent Flowsheet Documentation  Taken 3/8/2022 0400 by Malou Velazquez RN  Medication Review/Management: medications reviewed  Taken 3/8/2022 0000 by Malou Velazquez RN  Medication Review/Management: medications reviewed  Taken 3/7/2022 2200 by Malou Velazquez RN  Medication Review/Management: medications reviewed  Taken 3/7/2022 2000 by Malou Velazquez RN  Medication Review/Management:   medications reviewed   high-risk medications identified     Problem: Hypertension Comorbidity  Goal: Blood Pressure in Desired Range  Intervention: Maintain Hypertension-Management Strategies  Recent Flowsheet Documentation  Taken 3/8/2022 0400 by Malou Velazquez RN  Syncope Management: position changed slowly  Medication Review/Management: medications reviewed  Taken 3/8/2022 0000  by Velazquez, Malou, RN  Syncope Management: position changed slowly  Medication Review/Management: medications reviewed  Taken 3/7/2022 2200 by Malou Velazquez RN  Medication Review/Management: medications reviewed  Taken 3/7/2022 2000 by Malou Velazquez RN  Syncope Management: position changed slowly  Medication Review/Management:   medications reviewed   high-risk medications identified     Problem: Asthma Comorbidity  Goal: Maintenance of Asthma Control  Intervention: Maintain Asthma Symptom Control  Recent Flowsheet Documentation  Taken 3/8/2022 0400 by Malou Velazquez RN  Medication Review/Management: medications reviewed  Taken 3/8/2022 0000 by Malou Velazquez RN  Medication Review/Management: medications reviewed  Taken 3/7/2022 2200 by Malou Velazquez RN  Medication Review/Management: medications reviewed  Taken 3/7/2022 2000 by Malou Velazquez RN  Medication Review/Management:   medications reviewed   high-risk medications identified     Problem: Hypertension Comorbidity  Goal: Blood Pressure in Desired Range  Intervention: Maintain Blood Pressure Management  Recent Flowsheet Documentation  Taken 3/8/2022 0400 by Malou Velazquez RN  Syncope Management: position changed slowly  Medication Review/Management: medications reviewed  Taken 3/8/2022 0000 by Malou Velazquez RN  Syncope Management: position changed slowly  Medication Review/Management: medications reviewed  Taken 3/7/2022 2200 by Malou Velazquez RN  Medication Review/Management: medications reviewed  Taken 3/7/2022 2000 by Malou Velazquez RN  Syncope Management: position changed slowly  Medication Review/Management:   medications reviewed   high-risk medications identified     Problem: Skin Injury Risk Increased  Goal: Skin Health and Integrity  Intervention: Optimize Skin Protection  Recent Flowsheet Documentation  Taken 3/8/2022 0400 by Malou Velazquez RN  Pressure Reduction Techniques: frequent weight shift encouraged  Head  of Bed ([facility]) Positioning: [facility] at 20 degrees  Pressure Reduction Devices: pressure-redistributing mattress utilized  Skin Protection:   adhesive use limited   tubing/devices free from skin contact  Taken 3/8/2022 0000 by Malou Velazquez RN  Pressure Reduction Techniques: frequent weight shift encouraged  Head of Bed ([facility]) Positioning: [facility] at 20-30 degrees  Pressure Reduction Devices: pressure-redistributing mattress utilized  Skin Protection:   adhesive use limited   tubing/devices free from skin contact  Taken 3/7/2022 2000 by Malou Velazquez RN  Head of Bed ([facility]) Positioning: [facility] at 20 degrees  Pressure Reduction Devices:   pressure-redistributing mattress utilized   positioning supports utilized     Problem: Skin Injury Risk Increased  Goal: Skin Health and Integrity  Intervention: Optimize Skin Protection  Recent Flowsheet Documentation  Taken 3/8/2022 0400 by Malou Velazquez RN  Pressure Reduction Techniques: frequent weight shift encouraged  Head of Bed ([facility]) Positioning: [facility] at 20 degrees  Pressure Reduction Devices: pressure-redistributing mattress utilized  Skin Protection:   adhesive use limited   tubing/devices free from skin contact  Taken 3/8/2022 0000 by Malou Velazquez RN  Pressure Reduction Techniques: frequent weight shift encouraged  Head of Bed ([facility]) Positioning: [facility] at 20-30 degrees  Pressure Reduction Devices: pressure-redistributing mattress utilized  Skin Protection:   adhesive use limited   tubing/devices free from skin contact  Taken 3/7/2022 2000 by Malou Velazquez RN  Head of Bed ([facility]) Positioning: [facility] at 20 degrees  Pressure Reduction Devices:   pressure-redistributing mattress utilized   positioning supports utilized   Goal Outcome Evaluation:

## 2022-03-08 NOTE — PLAN OF CARE
Goal Outcome Evaluation:     Assessment: Catalina Moreno presents with functional mobility impairments which indicate the need for skilled intervention. Pt presents with  balance deficits when unsupported, making her a high falls risk and unsafe to go home and live alone. Pt also has a history of falls. Will progress standing balance exercises next treatment. Tolerating session today without incident. Will continue to follow and progress as tolerated.     Plan/Recommendations:   Pt would benefit from Inpatient Rehabilitation placement at discharge from facility and requires no DME at discharge.   Pt desires Inpatient Rehabilitation placement at discharge. Pt does not feel safe to return home due to weakness. Pt cooperative; agreeable to therapeutic recommendations and plan of care.

## 2022-03-08 NOTE — PROGRESS NOTES
QTC check reveals qtc of 403. Reglan with meals and bedtime 10 mg IV ordered.  Patient persistently nauseous with distention of stomach on KUB.  If no improvement by tomorrow GI consult will be needed for possible EGD.

## 2022-03-08 NOTE — PLAN OF CARE
Goal Outcome Evaluation:      Patient is alert and oriented x4. Patient is on room air. Patient is still in afib. Patient complains of nausea. KUB was done. Patient [facility] referral to [facility].                     Problem: Adult Inpatient Plan of Care  Goal: Plan of Care Review  Outcome: Ongoing, Progressing  Goal: Patient-Specific Goal (Individualized)  Outcome: Ongoing, Progressing  Goal: Absence of Hospital-Acquired Illness or Injury  Outcome: Ongoing, Progressing  Intervention: Identify and Manage Fall Risk  Recent Flowsheet Documentation  Taken 3/8/2022 1800 by Gricelda Martinez RN  Safety Promotion/Fall Prevention:   activity supervised   assistive device/personal items within reach   clutter free environment maintained   room organization consistent   safety round/check completed   nonskid shoes/slippers when out of bed   lighting adjusted  Taken 3/8/2022 1600 by Gricelda Martinez RN  Safety Promotion/Fall Prevention:   assistive device/personal items within reach   safety round/check completed   room organization consistent   nonskid shoes/slippers when out of bed   lighting adjusted   clutter free environment maintained  Taken 3/8/2022 1400 by Gricelda Martinez RN  Safety Promotion/Fall Prevention:   activity supervised   assistive device/personal items within reach   safety round/check completed   room organization consistent   nonskid shoes/slippers when out of bed   lighting adjusted   clutter free environment maintained  Taken 3/8/2022 1200 by Gricelda Martinez RN  Safety Promotion/Fall Prevention:   activity supervised   assistive device/personal items within reach   clutter free environment maintained   safety round/check completed   room organization consistent   nonskid shoes/slippers when out of bed   lighting adjusted  Taken 3/8/2022 1000 by Gricelda Martinez RN  Safety Promotion/Fall Prevention:   activity supervised   assistive device/personal items within reach   clutter free  environment maintained   safety round/check completed   room organization consistent   nonskid shoes/slippers when out of bed   lighting adjusted  Taken 3/8/2022 0800 by Gricelda Martinez RN  Safety Promotion/Fall Prevention:   activity supervised   assistive device/personal items within reach   clutter free environment maintained   safety round/check completed   room organization consistent   nonskid shoes/slippers when out of bed   lighting adjusted  Intervention: Prevent Skin Injury  Recent Flowsheet Documentation  Taken 3/8/2022 1523 by Gricelda Martinez RN  Skin Protection: adhesive use limited  Taken 3/8/2022 0800 by Gricelda Martinez RN  Skin Protection: adhesive use limited  Intervention: Prevent and Manage VTE (Venous Thromboembolism) Risk  Recent Flowsheet Documentation  Taken 3/8/2022 1523 by Gricelda Martinez RN  VTE Prevention/Management:   bilateral   sequential compression devices off  Taken 3/8/2022 1200 by Gricelda Martinez RN  VTE Prevention/Management:   bilateral   sequential compression devices off   patient refused intervention  Taken 3/8/2022 0800 by Gricelda Martinez RN  VTE Prevention/Management:   bilateral   sequential compression devices off   patient refused intervention  Intervention: Prevent Infection  Recent Flowsheet Documentation  Taken 3/8/2022 1800 by Gricelda Martinez RN  Infection Prevention:   single patient room provided   rest/sleep promoted   personal protective equipment utilized   hand hygiene promoted   equipment surfaces disinfected  Taken 3/8/2022 1600 by Gricelda Martinez RN  Infection Prevention:   single patient room provided   rest/sleep promoted   personal protective equipment utilized   hand hygiene promoted   equipment surfaces disinfected  Taken 3/8/2022 1400 by Gricelda Martinez RN  Infection Prevention:   single patient room provided   rest/sleep promoted   personal protective equipment utilized   hand hygiene promoted   equipment surfaces disinfected  Taken  3/8/2022 1200 by Gricelda Martinez RN  Infection Prevention:   rest/sleep promoted   personal protective equipment utilized   hand hygiene promoted   equipment surfaces disinfected   single patient room provided  Taken 3/8/2022 1000 by Gricelda Martinez RN  Infection Prevention:   single patient room provided   rest/sleep promoted   personal protective equipment utilized   equipment surfaces disinfected   hand hygiene promoted  Taken 3/8/2022 0800 by Gricelda Martinez RN  Infection Prevention:   single patient room provided   rest/sleep promoted   personal protective equipment utilized   hand hygiene promoted   equipment surfaces disinfected  Goal: Optimal Comfort and Wellbeing  Outcome: Ongoing, Progressing  Intervention: Provide Person-Centered Care  Recent Flowsheet Documentation  Taken 3/8/2022 1523 by Gricelda Martinez RN  Trust Relationship/Rapport:   care explained   choices provided  Taken 3/8/2022 1200 by Gricelda Martinez RN  Trust Relationship/Rapport:   care explained   choices provided  Taken 3/8/2022 0800 by Gricelda Martinez RN  Trust Relationship/Rapport:   care explained   choices provided  Goal: Readiness for Transition of Care  Outcome: Ongoing, Progressing     Problem: Pain Acute  Goal: Acceptable Pain Control and Functional Ability  Outcome: Ongoing, Progressing  Intervention: Prevent or Manage Pain  Recent Flowsheet Documentation  Taken 3/8/2022 1800 by Gricelda Martinez RN  Medication Review/Management: medications reviewed  Taken 3/8/2022 1600 by Gricelda Martinez RN  Medication Review/Management: medications reviewed  Taken 3/8/2022 1523 by Gricelda Martinez RN  Sensory Stimulation Regulation: care clustered  Sleep/Rest Enhancement: awakenings minimized  Taken 3/8/2022 1400 by Gricelda Martinez RN  Medication Review/Management: medications reviewed  Taken 3/8/2022 1200 by Gricelda Martinez RN  Sensory Stimulation Regulation: care clustered  Sleep/Rest Enhancement: awakenings  minimized  Medication Review/Management: medications reviewed  Taken 3/8/2022 1000 by Gricelda Martinez RN  Medication Review/Management: medications reviewed  Taken 3/8/2022 0800 by Gricelda Martinez RN  Sensory Stimulation Regulation:   care clustered   auditory stimulation minimized  Medication Review/Management: medications reviewed  Intervention: Optimize Psychosocial Wellbeing  Recent Flowsheet Documentation  Taken 3/8/2022 1523 by Gricelda Martinez RN  Diversional Activities: television  Taken 3/8/2022 1200 by Gricelda Martinez RN  Diversional Activities: television  Taken 3/8/2022 0800 by Gricelda Martinez RN  Supportive Measures: active listening utilized  Diversional Activities: television  Goal: Acceptable Pain Control and Functional Ability  Outcome: Ongoing, Progressing  Intervention: Prevent or Manage Pain  Recent Flowsheet Documentation  Taken 3/8/2022 1800 by Gricelda Martinez RN  Medication Review/Management: medications reviewed  Taken 3/8/2022 1600 by Gricelda Martinez RN  Medication Review/Management: medications reviewed  Taken 3/8/2022 1523 by Gricelda Martinez RN  Sensory Stimulation Regulation: care clustered  Sleep/Rest Enhancement: awakenings minimized  Taken 3/8/2022 1400 by Gricelda Martinez RN  Medication Review/Management: medications reviewed  Taken 3/8/2022 1200 by Gricelda Martinez RN  Sensory Stimulation Regulation: care clustered  Sleep/Rest Enhancement: awakenings minimized  Medication Review/Management: medications reviewed  Taken 3/8/2022 1000 by Gricelda Martinez RN  Medication Review/Management: medications reviewed  Taken 3/8/2022 0800 by Gricelda Martinez RN  Sensory Stimulation Regulation:   care clustered   auditory stimulation minimized  Medication Review/Management: medications reviewed  Intervention: Optimize Psychosocial Wellbeing  Recent Flowsheet Documentation  Taken 3/8/2022 1523 by Gricelda Martinez RN  Diversional Activities: television  Taken 3/8/2022 1200 by  Gricelda Martinez RN  Diversional Activities: television  Taken 3/8/2022 0800 by Gricelda Martinez RN  Supportive Measures: active listening utilized  Diversional Activities: television     Problem: Asthma Comorbidity  Goal: Maintenance of Asthma Control  Outcome: Ongoing, Progressing  Intervention: Maintain Asthma Symptom Control  Recent Flowsheet Documentation  Taken 3/8/2022 1800 by Gricelda Martinez RN  Medication Review/Management: medications reviewed  Taken 3/8/2022 1600 by Gricelda Martinez RN  Medication Review/Management: medications reviewed  Taken 3/8/2022 1400 by Gricelda Martinez RN  Medication Review/Management: medications reviewed  Taken 3/8/2022 1200 by Gricelda Martinez RN  Medication Review/Management: medications reviewed  Taken 3/8/2022 1000 by Gricelda Martinez RN  Medication Review/Management: medications reviewed  Taken 3/8/2022 0800 by Gricelda Martinez RN  Medication Review/Management: medications reviewed     Problem: Hypertension Comorbidity  Goal: Blood Pressure in Desired Range  Outcome: Ongoing, Progressing  Intervention: Maintain Blood Pressure Management  Recent Flowsheet Documentation  Taken 3/8/2022 1800 by Gricelda Martinez RN  Medication Review/Management: medications reviewed  Taken 3/8/2022 1600 by Gricelda Martinez RN  Medication Review/Management: medications reviewed  Taken 3/8/2022 1400 by Gricelda Martinez RN  Medication Review/Management: medications reviewed  Taken 3/8/2022 1200 by Gricelda Martinez RN  Medication Review/Management: medications reviewed  Taken 3/8/2022 1000 by Gricelda Martinez RN  Medication Review/Management: medications reviewed  Taken 3/8/2022 0800 by Gricelda Mratinez RN  Syncope Management: position changed slowly  Medication Review/Management: medications reviewed     Problem: Skin Injury Risk Increased  Goal: Skin Health and Integrity  Outcome: Ongoing, Progressing  Intervention: Optimize Skin Protection  Recent Flowsheet Documentation  Taken  3/8/2022 1523 by Gricelda Martinez RN  Pressure Reduction Techniques: frequent weight shift encouraged  Pressure Reduction Devices: pressure-redistributing mattress utilized  Skin Protection: adhesive use limited  Taken 3/8/2022 0800 by Gricelda Martinez RN  Pressure Reduction Techniques: frequent weight shift encouraged  Pressure Reduction Devices: pressure-redistributing mattress utilized  Skin Protection: adhesive use limited     Problem: Fall Injury Risk  Goal: Absence of Fall and Fall-Related Injury  Outcome: Ongoing, Progressing  Intervention: Identify and Manage Contributors  Recent Flowsheet Documentation  Taken 3/8/2022 1800 by Gricelda Martinez RN  Medication Review/Management: medications reviewed  Taken 3/8/2022 1600 by Gricelda Martinez RN  Medication Review/Management: medications reviewed  Taken 3/8/2022 1400 by Gricelda Martinez RN  Medication Review/Management: medications reviewed  Taken 3/8/2022 1200 by Gricelda Martinez RN  Medication Review/Management: medications reviewed  Taken 3/8/2022 1000 by Gricelda Martinez RN  Medication Review/Management: medications reviewed  Taken 3/8/2022 0800 by Gricelda Martinez RN  Medication Review/Management: medications reviewed  Intervention: Promote Injury-Free Environment  Recent Flowsheet Documentation  Taken 3/8/2022 1800 by Gricelda Martinez RN  Safety Promotion/Fall Prevention:   activity supervised   assistive device/personal items within reach   clutter free environment maintained   room organization consistent   safety round/check completed   nonskid shoes/slippers when out of bed   lighting adjusted  Taken 3/8/2022 1600 by Gricelda Martinez RN  Safety Promotion/Fall Prevention:   assistive device/personal items within reach   safety round/check completed   room organization consistent   nonskid shoes/slippers when out of bed   lighting adjusted   clutter free environment maintained  Taken 3/8/2022 1400 by Gricelda Martinez RN  Safety Promotion/Fall  Prevention:   activity supervised   assistive device/personal items within reach   safety round/check completed   room organization consistent   nonskid shoes/slippers when out of bed   lighting adjusted   clutter free environment maintained  Taken 3/8/2022 1200 by Gricelda Martinez RN  Safety Promotion/Fall Prevention:   activity supervised   assistive device/personal items within reach   clutter free environment maintained   safety round/check completed   room organization consistent   nonskid shoes/slippers when out of bed   lighting adjusted  Taken 3/8/2022 1000 by Gricelda Martinez RN  Safety Promotion/Fall Prevention:   activity supervised   assistive device/personal items within reach   clutter free environment maintained   safety round/check completed   room organization consistent   nonskid shoes/slippers when out of bed   lighting adjusted  Taken 3/8/2022 0800 by Gricelda Martinez RN  Safety Promotion/Fall Prevention:   activity supervised   assistive device/personal items within reach   clutter free environment maintained   safety round/check completed   room organization consistent   nonskid shoes/slippers when out of bed   lighting adjusted  Goal: Absence of Fall and Fall-Related Injury  Outcome: Ongoing, Progressing  Intervention: Identify and Manage Contributors  Recent Flowsheet Documentation  Taken 3/8/2022 1800 by Gricelda Martinez RN  Medication Review/Management: medications reviewed  Taken 3/8/2022 1600 by Gricelda Martinez RN  Medication Review/Management: medications reviewed  Taken 3/8/2022 1400 by Gricelda Martinez RN  Medication Review/Management: medications reviewed  Taken 3/8/2022 1200 by Gricelda Martinez RN  Medication Review/Management: medications reviewed  Taken 3/8/2022 1000 by Gricelda Martinez RN  Medication Review/Management: medications reviewed  Taken 3/8/2022 0800 by Gricelda Martinez RN  Medication Review/Management: medications reviewed  Intervention: Promote Injury-Free  Environment  Recent Flowsheet Documentation  Taken 3/8/2022 1800 by Gricelda Martinez RN  Safety Promotion/Fall Prevention:   activity supervised   assistive device/personal items within reach   clutter free environment maintained   room organization consistent   safety round/check completed   nonskid shoes/slippers when out of bed   lighting adjusted  Taken 3/8/2022 1600 by Gricelda Martinez RN  Safety Promotion/Fall Prevention:   assistive device/personal items within reach   safety round/check completed   room organization consistent   nonskid shoes/slippers when out of bed   lighting adjusted   clutter free environment maintained  Taken 3/8/2022 1400 by Gricelda Martinez RN  Safety Promotion/Fall Prevention:   activity supervised   assistive device/personal items within reach   safety round/check completed   room organization consistent   nonskid shoes/slippers when out of bed   lighting adjusted   clutter free environment maintained  Taken 3/8/2022 1200 by Gricelda Martinez RN  Safety Promotion/Fall Prevention:   activity supervised   assistive device/personal items within reach   clutter free environment maintained   safety round/check completed   room organization consistent   nonskid shoes/slippers when out of bed   lighting adjusted  Taken 3/8/2022 1000 by Grieclda Martinez RN  Safety Promotion/Fall Prevention:   activity supervised   assistive device/personal items within reach   clutter free environment maintained   safety round/check completed   room organization consistent   nonskid shoes/slippers when out of bed   lighting adjusted  Taken 3/8/2022 0800 by Gricelda Martinez RN  Safety Promotion/Fall Prevention:   activity supervised   assistive device/personal items within reach   clutter free environment maintained   safety round/check completed   room organization consistent   nonskid shoes/slippers when out of bed   lighting adjusted  Goal: Absence of Fall and Fall-Related Injury  Outcome: Ongoing,  Progressing  Intervention: Identify and Manage Contributors  Recent Flowsheet Documentation  Taken 3/8/2022 1800 by Gricelda Martinez RN  Medication Review/Management: medications reviewed  Taken 3/8/2022 1600 by Gricelda Martinez RN  Medication Review/Management: medications reviewed  Taken 3/8/2022 1400 by Gricelda Martinez RN  Medication Review/Management: medications reviewed  Taken 3/8/2022 1200 by Gricelda Martinez RN  Medication Review/Management: medications reviewed  Taken 3/8/2022 1000 by Gricelda Martinez RN  Medication Review/Management: medications reviewed  Taken 3/8/2022 0800 by Gricelda Martinez RN  Medication Review/Management: medications reviewed  Intervention: Promote Injury-Free Environment  Recent Flowsheet Documentation  Taken 3/8/2022 1800 by Gricelda Martinez RN  Safety Promotion/Fall Prevention:   activity supervised   assistive device/personal items within reach   clutter free environment maintained   room organization consistent   safety round/check completed   nonskid shoes/slippers when out of bed   lighting adjusted  Taken 3/8/2022 1600 by Gricelda Martinez RN  Safety Promotion/Fall Prevention:   assistive device/personal items within reach   safety round/check completed   room organization consistent   nonskid shoes/slippers when out of bed   lighting adjusted   clutter free environment maintained  Taken 3/8/2022 1400 by Gricelda Martinez RN  Safety Promotion/Fall Prevention:   activity supervised   assistive device/personal items within reach   safety round/check completed   room organization consistent   nonskid shoes/slippers when out of bed   lighting adjusted   clutter free environment maintained  Taken 3/8/2022 1200 by Gricelda Martinez RN  Safety Promotion/Fall Prevention:   activity supervised   assistive device/personal items within reach   clutter free environment maintained   safety round/check completed   room organization consistent   nonskid shoes/slippers when out of  bed   lighting adjusted  Taken 3/8/2022 1000 by Gricelda Martinez RN  Safety Promotion/Fall Prevention:   activity supervised   assistive device/personal items within reach   clutter free environment maintained   safety round/check completed   room organization consistent   nonskid shoes/slippers when out of bed   lighting adjusted  Taken 3/8/2022 0800 by Gricelda Martinez RN  Safety Promotion/Fall Prevention:   activity supervised   assistive device/personal items within reach   clutter free environment maintained   safety round/check completed   room organization consistent   nonskid shoes/slippers when out of bed   lighting adjusted  Goal: Absence of Fall and Fall-Related Injury  Outcome: Ongoing, Progressing  Intervention: Identify and Manage Contributors  Recent Flowsheet Documentation  Taken 3/8/2022 1800 by Gricelda Martinez RN  Medication Review/Management: medications reviewed  Taken 3/8/2022 1600 by Gricelda Martinez RN  Medication Review/Management: medications reviewed  Taken 3/8/2022 1400 by Gricelda Martinez RN  Medication Review/Management: medications reviewed  Taken 3/8/2022 1200 by Gricelda Martinez RN  Medication Review/Management: medications reviewed  Taken 3/8/2022 1000 by Gricelda Martinez RN  Medication Review/Management: medications reviewed  Taken 3/8/2022 0800 by Gricelda Martinez RN  Medication Review/Management: medications reviewed  Intervention: Promote Injury-Free Environment  Recent Flowsheet Documentation  Taken 3/8/2022 1800 by Gricelda Martinez RN  Safety Promotion/Fall Prevention:   activity supervised   assistive device/personal items within reach   clutter free environment maintained   room organization consistent   safety round/check completed   nonskid shoes/slippers when out of bed   lighting adjusted  Taken 3/8/2022 1600 by Gricelda Martinez RN  Safety Promotion/Fall Prevention:   assistive device/personal items within reach   safety round/check completed   room  organization consistent   nonskid shoes/slippers when out of bed   lighting adjusted   clutter free environment maintained  Taken 3/8/2022 1400 by Gricelda Martinez RN  Safety Promotion/Fall Prevention:   activity supervised   assistive device/personal items within reach   safety round/check completed   room organization consistent   nonskid shoes/slippers when out of bed   lighting adjusted   clutter free environment maintained  Taken 3/8/2022 1200 by Gricelda Martinez RN  Safety Promotion/Fall Prevention:   activity supervised   assistive device/personal items within reach   clutter free environment maintained   safety round/check completed   room organization consistent   nonskid shoes/slippers when out of bed   lighting adjusted  Taken 3/8/2022 1000 by Gricelda Martinez RN  Safety Promotion/Fall Prevention:   activity supervised   assistive device/personal items within reach   clutter free environment maintained   safety round/check completed   room organization consistent   nonskid shoes/slippers when out of bed   lighting adjusted  Taken 3/8/2022 0800 by Gricelda Martinez RN  Safety Promotion/Fall Prevention:   activity supervised   assistive device/personal items within reach   clutter free environment maintained   safety round/check completed   room organization consistent   nonskid shoes/slippers when out of bed   lighting adjusted  Goal: Absence of Fall and Fall-Related Injury  Outcome: Ongoing, Progressing  Intervention: Identify and Manage Contributors  Recent Flowsheet Documentation  Taken 3/8/2022 1800 by Gricelda Martinez RN  Medication Review/Management: medications reviewed  Taken 3/8/2022 1600 by Gricelda Martinez RN  Medication Review/Management: medications reviewed  Taken 3/8/2022 1400 by Gricelda Martinez RN  Medication Review/Management: medications reviewed  Taken 3/8/2022 1200 by Gricelda Martinez RN  Medication Review/Management: medications reviewed  Taken 3/8/2022 1000 by Juan  BRENDAN Madison  Medication Review/Management: medications reviewed  Taken 3/8/2022 0800 by Gricelda Martinez RN  Medication Review/Management: medications reviewed  Intervention: Promote Injury-Free Environment  Recent Flowsheet Documentation  Taken 3/8/2022 1800 by Gricelda Martinez RN  Safety Promotion/Fall Prevention:   activity supervised   assistive device/personal items within reach   clutter free environment maintained   room organization consistent   safety round/check completed   nonskid shoes/slippers when out of bed   lighting adjusted  Taken 3/8/2022 1600 by Gricelda Martinez RN  Safety Promotion/Fall Prevention:   assistive device/personal items within reach   safety round/check completed   room organization consistent   nonskid shoes/slippers when out of bed   lighting adjusted   clutter free environment maintained  Taken 3/8/2022 1400 by Gricelda Martinez RN  Safety Promotion/Fall Prevention:   activity supervised   assistive device/personal items within reach   safety round/check completed   room organization consistent   nonskid shoes/slippers when out of bed   lighting adjusted   clutter free environment maintained  Taken 3/8/2022 1200 by Gricelda Martinez RN  Safety Promotion/Fall Prevention:   activity supervised   assistive device/personal items within reach   clutter free environment maintained   safety round/check completed   room organization consistent   nonskid shoes/slippers when out of bed   lighting adjusted  Taken 3/8/2022 1000 by Gricelda Martinez RN  Safety Promotion/Fall Prevention:   activity supervised   assistive device/personal items within reach   clutter free environment maintained   safety round/check completed   room organization consistent   nonskid shoes/slippers when out of bed   lighting adjusted  Taken 3/8/2022 0800 by Gricelda Martinez RN  Safety Promotion/Fall Prevention:   activity supervised   assistive device/personal items within reach   clutter free environment  maintained   safety round/check completed   room organization consistent   nonskid shoes/slippers when out of bed   lighting adjusted     Problem: Dysrhythmia  Goal: Normalized Cardiac Rhythm  Outcome: Ongoing, Progressing  Intervention: Monitor and Manage Cardiac Rhythm Effect  Recent Flowsheet Documentation  Taken 3/8/2022 1523 by Gricelda Martinez RN  VTE Prevention/Management:   bilateral   sequential compression devices off  Taken 3/8/2022 1200 by Gricelda Martinez RN  VTE Prevention/Management:   bilateral   sequential compression devices off   patient refused intervention  Taken 3/8/2022 0800 by Gricelda Martinez RN  VTE Prevention/Management:   bilateral   sequential compression devices off   patient refused intervention

## 2022-03-09 ENCOUNTER — APPOINTMENT (OUTPATIENT)
Dept: GENERAL RADIOLOGY | Facility: HOSPITAL | Age: 82
End: 2022-03-09

## 2022-03-09 ENCOUNTER — APPOINTMENT (OUTPATIENT)
Dept: CARDIOLOGY | Facility: HOSPITAL | Age: 82
End: 2022-03-09

## 2022-03-09 LAB
ANION GAP SERPL CALCULATED.3IONS-SCNC: 9 MMOL/L (ref 5–15)
BASOPHILS # BLD AUTO: 0.1 10*3/MM3 (ref 0–0.2)
BASOPHILS NFR BLD AUTO: 1.1 % (ref 0–1.5)
BH CV LOWER VASCULAR LEFT COMMON FEMORAL AUGMENT: NORMAL
BH CV LOWER VASCULAR LEFT COMMON FEMORAL COMPETENT: NORMAL
BH CV LOWER VASCULAR LEFT COMMON FEMORAL COMPRESS: NORMAL
BH CV LOWER VASCULAR LEFT COMMON FEMORAL PHASIC: NORMAL
BH CV LOWER VASCULAR LEFT COMMON FEMORAL SPONT: NORMAL
BH CV LOWER VASCULAR LEFT DISTAL FEMORAL COMPRESS: NORMAL
BH CV LOWER VASCULAR LEFT GASTRONEMIUS COMPRESS: NORMAL
BH CV LOWER VASCULAR LEFT GREATER SAPH AK COMPRESS: NORMAL
BH CV LOWER VASCULAR LEFT GREATER SAPH BK COMPRESS: NORMAL
BH CV LOWER VASCULAR LEFT LESSER SAPH COMPRESS: NORMAL
BH CV LOWER VASCULAR LEFT MID FEMORAL AUGMENT: NORMAL
BH CV LOWER VASCULAR LEFT MID FEMORAL COMPETENT: NORMAL
BH CV LOWER VASCULAR LEFT MID FEMORAL COMPRESS: NORMAL
BH CV LOWER VASCULAR LEFT MID FEMORAL PHASIC: NORMAL
BH CV LOWER VASCULAR LEFT MID FEMORAL SPONT: NORMAL
BH CV LOWER VASCULAR LEFT PERONEAL COMPRESS: NORMAL
BH CV LOWER VASCULAR LEFT POPLITEAL AUGMENT: NORMAL
BH CV LOWER VASCULAR LEFT POPLITEAL COMPETENT: NORMAL
BH CV LOWER VASCULAR LEFT POPLITEAL COMPRESS: NORMAL
BH CV LOWER VASCULAR LEFT POPLITEAL PHASIC: NORMAL
BH CV LOWER VASCULAR LEFT POPLITEAL SPONT: NORMAL
BH CV LOWER VASCULAR LEFT POSTERIOR TIBIAL COMPRESS: NORMAL
BH CV LOWER VASCULAR LEFT PROXIMAL FEMORAL COMPRESS: NORMAL
BH CV LOWER VASCULAR LEFT SAPHENOFEMORAL JUNCTION COMPRESS: NORMAL
BH CV LOWER VASCULAR RIGHT COMMON FEMORAL AUGMENT: NORMAL
BH CV LOWER VASCULAR RIGHT COMMON FEMORAL COMPETENT: NORMAL
BH CV LOWER VASCULAR RIGHT COMMON FEMORAL COMPRESS: NORMAL
BH CV LOWER VASCULAR RIGHT COMMON FEMORAL PHASIC: NORMAL
BH CV LOWER VASCULAR RIGHT COMMON FEMORAL SPONT: NORMAL
BH CV LOWER VASCULAR RIGHT DISTAL FEMORAL COMPRESS: NORMAL
BH CV LOWER VASCULAR RIGHT GASTRONEMIUS COMPRESS: NORMAL
BH CV LOWER VASCULAR RIGHT GREATER SAPH AK COMPRESS: NORMAL
BH CV LOWER VASCULAR RIGHT GREATER SAPH BK COMPRESS: NORMAL
BH CV LOWER VASCULAR RIGHT LESSER SAPH COMPRESS: NORMAL
BH CV LOWER VASCULAR RIGHT MID FEMORAL AUGMENT: NORMAL
BH CV LOWER VASCULAR RIGHT MID FEMORAL COMPETENT: NORMAL
BH CV LOWER VASCULAR RIGHT MID FEMORAL COMPRESS: NORMAL
BH CV LOWER VASCULAR RIGHT MID FEMORAL PHASIC: NORMAL
BH CV LOWER VASCULAR RIGHT MID FEMORAL SPONT: NORMAL
BH CV LOWER VASCULAR RIGHT PERONEAL COMPRESS: NORMAL
BH CV LOWER VASCULAR RIGHT POPLITEAL AUGMENT: NORMAL
BH CV LOWER VASCULAR RIGHT POPLITEAL COMPETENT: NORMAL
BH CV LOWER VASCULAR RIGHT POPLITEAL COMPRESS: NORMAL
BH CV LOWER VASCULAR RIGHT POPLITEAL PHASIC: NORMAL
BH CV LOWER VASCULAR RIGHT POPLITEAL SPONT: NORMAL
BH CV LOWER VASCULAR RIGHT POSTERIOR TIBIAL COMPRESS: NORMAL
BH CV LOWER VASCULAR RIGHT PROXIMAL FEMORAL COMPRESS: NORMAL
BH CV LOWER VASCULAR RIGHT SAPHENOFEMORAL JUNCTION COMPRESS: NORMAL
BH CV POP FLUID COLLECT LEFT: 1
BUN SERPL-MCNC: 14 MG/DL (ref 8–23)
BUN/CREAT SERPL: 16.3 (ref 7–25)
CALCIUM SPEC-SCNC: 9.1 MG/DL (ref 8.6–10.5)
CHLORIDE SERPL-SCNC: 103 MMOL/L (ref 98–107)
CO2 SERPL-SCNC: 28 MMOL/L (ref 22–29)
CREAT SERPL-MCNC: 0.86 MG/DL (ref 0.57–1)
DEPRECATED RDW RBC AUTO: 52.5 FL (ref 37–54)
EGFRCR SERPLBLD CKD-EPI 2021: 68.4 ML/MIN/1.73
EOSINOPHIL # BLD AUTO: 0.2 10*3/MM3 (ref 0–0.4)
EOSINOPHIL NFR BLD AUTO: 2.2 % (ref 0.3–6.2)
ERYTHROCYTE [DISTWIDTH] IN BLOOD BY AUTOMATED COUNT: 19 % (ref 12.3–15.4)
GLUCOSE SERPL-MCNC: 93 MG/DL (ref 65–99)
HCT VFR BLD AUTO: 31.2 % (ref 34–46.6)
HGB BLD-MCNC: 10.1 G/DL (ref 12–15.9)
LYMPHOCYTES # BLD AUTO: 2.9 10*3/MM3 (ref 0.7–3.1)
LYMPHOCYTES NFR BLD AUTO: 40.4 % (ref 19.6–45.3)
MAGNESIUM SERPL-MCNC: 2 MG/DL (ref 1.6–2.4)
MAXIMAL PREDICTED HEART RATE: 140 BPM
MCH RBC QN AUTO: 25.3 PG (ref 26.6–33)
MCHC RBC AUTO-ENTMCNC: 32.3 G/DL (ref 31.5–35.7)
MCV RBC AUTO: 78.3 FL (ref 79–97)
MONOCYTES # BLD AUTO: 0.9 10*3/MM3 (ref 0.1–0.9)
MONOCYTES NFR BLD AUTO: 12.8 % (ref 5–12)
NEUTROPHILS NFR BLD AUTO: 3.1 10*3/MM3 (ref 1.7–7)
NEUTROPHILS NFR BLD AUTO: 43.5 % (ref 42.7–76)
NRBC BLD AUTO-RTO: 0 /100 WBC (ref 0–0.2)
PHOSPHATE SERPL-MCNC: 3 MG/DL (ref 2.5–4.5)
PLATELET # BLD AUTO: 213 10*3/MM3 (ref 140–450)
PMV BLD AUTO: 8.7 FL (ref 6–12)
POTASSIUM SERPL-SCNC: 4 MMOL/L (ref 3.5–5.2)
RBC # BLD AUTO: 3.98 10*6/MM3 (ref 3.77–5.28)
SODIUM SERPL-SCNC: 140 MMOL/L (ref 136–145)
STRESS TARGET HR: 119 BPM
WBC NRBC COR # BLD: 7.1 10*3/MM3 (ref 3.4–10.8)

## 2022-03-09 PROCEDURE — 25010000002 ENOXAPARIN PER 10 MG: Performed by: PHYSICIAN ASSISTANT

## 2022-03-09 PROCEDURE — 84100 ASSAY OF PHOSPHORUS: CPT | Performed by: INTERNAL MEDICINE

## 2022-03-09 PROCEDURE — 93970 EXTREMITY STUDY: CPT

## 2022-03-09 PROCEDURE — 97112 NEUROMUSCULAR REEDUCATION: CPT

## 2022-03-09 PROCEDURE — 83735 ASSAY OF MAGNESIUM: CPT | Performed by: INTERNAL MEDICINE

## 2022-03-09 PROCEDURE — 25010000002 METOCLOPRAMIDE PER 10 MG: Performed by: INTERNAL MEDICINE

## 2022-03-09 PROCEDURE — 85025 COMPLETE CBC W/AUTO DIFF WBC: CPT | Performed by: INTERNAL MEDICINE

## 2022-03-09 PROCEDURE — 97116 GAIT TRAINING THERAPY: CPT

## 2022-03-09 PROCEDURE — 99232 SBSQ HOSP IP/OBS MODERATE 35: CPT | Performed by: INTERNAL MEDICINE

## 2022-03-09 PROCEDURE — 97535 SELF CARE MNGMENT TRAINING: CPT

## 2022-03-09 PROCEDURE — 25010000002 FUROSEMIDE PER 20 MG: Performed by: PHYSICIAN ASSISTANT

## 2022-03-09 PROCEDURE — 74018 RADEX ABDOMEN 1 VIEW: CPT

## 2022-03-09 PROCEDURE — 80048 BASIC METABOLIC PNL TOTAL CA: CPT | Performed by: INTERNAL MEDICINE

## 2022-03-09 RX ADMIN — Medication 10 ML: at 08:17

## 2022-03-09 RX ADMIN — ATORVASTATIN CALCIUM 10 MG: 10 TABLET, FILM COATED ORAL at 21:22

## 2022-03-09 RX ADMIN — SILDENAFIL 20 MG: 20 TABLET, FILM COATED ORAL at 08:17

## 2022-03-09 RX ADMIN — ZOLPIDEM TARTRATE 5 MG: 5 TABLET ORAL at 00:06

## 2022-03-09 RX ADMIN — SILDENAFIL 20 MG: 20 TABLET, FILM COATED ORAL at 00:06

## 2022-03-09 RX ADMIN — Medication 10 ML: at 09:55

## 2022-03-09 RX ADMIN — FAMOTIDINE 20 MG: 20 TABLET, FILM COATED ORAL at 08:08

## 2022-03-09 RX ADMIN — METOPROLOL TARTRATE 25 MG: 25 TABLET, FILM COATED ORAL at 08:08

## 2022-03-09 RX ADMIN — HYDROCODONE BITARTRATE AND ACETAMINOPHEN 1 TABLET: 7.5; 325 TABLET ORAL at 08:27

## 2022-03-09 RX ADMIN — CYANOCOBALAMIN TAB 1000 MCG 1000 MCG: 1000 TAB at 08:08

## 2022-03-09 RX ADMIN — GABAPENTIN 600 MG: 600 TABLET, FILM COATED ORAL at 21:22

## 2022-03-09 RX ADMIN — Medication 5 MG: at 21:20

## 2022-03-09 RX ADMIN — Medication 10 ML: at 21:29

## 2022-03-09 RX ADMIN — GABAPENTIN 600 MG: 600 TABLET, FILM COATED ORAL at 08:07

## 2022-03-09 RX ADMIN — HYDROCODONE BITARTRATE AND ACETAMINOPHEN 1 TABLET: 7.5; 325 TABLET ORAL at 00:48

## 2022-03-09 RX ADMIN — METOCLOPRAMIDE HYDROCHLORIDE 10 MG: 5 INJECTION INTRAMUSCULAR; INTRAVENOUS at 17:16

## 2022-03-09 RX ADMIN — Medication 10 ML: at 08:10

## 2022-03-09 RX ADMIN — HYDROCODONE BITARTRATE AND ACETAMINOPHEN 1 TABLET: 7.5; 325 TABLET ORAL at 21:22

## 2022-03-09 RX ADMIN — AMIODARONE HYDROCHLORIDE 200 MG: 200 TABLET ORAL at 08:08

## 2022-03-09 RX ADMIN — AMIODARONE HYDROCHLORIDE 200 MG: 200 TABLET ORAL at 17:16

## 2022-03-09 RX ADMIN — ENOXAPARIN SODIUM 70 MG: 100 INJECTION SUBCUTANEOUS at 04:54

## 2022-03-09 RX ADMIN — Medication 10 ML: at 21:27

## 2022-03-09 RX ADMIN — PANTOPRAZOLE SODIUM 40 MG: 40 TABLET, DELAYED RELEASE ORAL at 12:58

## 2022-03-09 RX ADMIN — ENOXAPARIN SODIUM 70 MG: 100 INJECTION SUBCUTANEOUS at 17:16

## 2022-03-09 RX ADMIN — CYCLOBENZAPRINE 5 MG: 10 TABLET, FILM COATED ORAL at 21:20

## 2022-03-09 RX ADMIN — LEVOTHYROXINE SODIUM 88 MCG: 0.09 TABLET ORAL at 04:54

## 2022-03-09 RX ADMIN — HYDROCODONE BITARTRATE AND ACETAMINOPHEN 1 TABLET: 7.5; 325 TABLET ORAL at 14:32

## 2022-03-09 RX ADMIN — GABAPENTIN 600 MG: 600 TABLET, FILM COATED ORAL at 17:16

## 2022-03-09 RX ADMIN — METOCLOPRAMIDE HYDROCHLORIDE 10 MG: 5 INJECTION INTRAMUSCULAR; INTRAVENOUS at 08:08

## 2022-03-09 RX ADMIN — POTASSIUM CHLORIDE 40 MEQ: 1500 TABLET, EXTENDED RELEASE ORAL at 08:07

## 2022-03-09 RX ADMIN — FUROSEMIDE 40 MG: 10 INJECTION, SOLUTION INTRAVENOUS at 08:08

## 2022-03-09 RX ADMIN — DIGOXIN 125 MCG: 125 TABLET ORAL at 12:58

## 2022-03-09 RX ADMIN — GABAPENTIN 600 MG: 600 TABLET, FILM COATED ORAL at 12:58

## 2022-03-09 RX ADMIN — METOCLOPRAMIDE HYDROCHLORIDE 10 MG: 5 INJECTION INTRAMUSCULAR; INTRAVENOUS at 12:58

## 2022-03-09 RX ADMIN — CYCLOBENZAPRINE 5 MG: 10 TABLET, FILM COATED ORAL at 08:28

## 2022-03-09 RX ADMIN — METOCLOPRAMIDE HYDROCHLORIDE 10 MG: 5 INJECTION INTRAMUSCULAR; INTRAVENOUS at 21:25

## 2022-03-09 RX ADMIN — ROPINIROLE HYDROCHLORIDE 0.25 MG: 0.25 TABLET, FILM COATED ORAL at 21:22

## 2022-03-09 RX ADMIN — SERTRALINE 50 MG: 50 TABLET, FILM COATED ORAL at 21:22

## 2022-03-09 NOTE — PLAN OF CARE
Catalinajd Moreno presents with ADL impairments below baseline abilities which indicate the need for continued skilled intervention while inpatient. Pt increasingly fatigued this date, c/o poor sleep quality due to LLE pain / muscle cramps.  Cont to be limited by elevated HR, though able to complete tasks at her own pace. Requires Min A for balance/safety, and can demo impaired safety awareness. Tolerating session today without incident. Will continue to follow and progress as tolerated.

## 2022-03-09 NOTE — CASE MANAGEMENT/SOCIAL WORK
Continued Stay Note   Gabriel     Patient Name: Catalina Moreno  MRN: [id number]  Today's Date: 3/9/2022    Admit Date: 3/4/2022     Discharge Plan     Row Name 03/09/22 0837       Plan    Plan [facility] accepted, precert approved, PASRR per facility    Plan Comments Per Tawanna at [facility] precert is approved and they can provide transportation if patient is discharged between 11am-noon today. MD and Bedside RN notified via [employer] secure chat.              Phone communication or documentation only - no physical contact with patient or family.      Irena Grier, RN

## 2022-03-09 NOTE — THERAPY TREATMENT NOTE
"Subjective: Pt agreeable to therapeutic plan of care. Pt reports no dizziness this date. Pt states she feels weak. Pt complains she wants more medicine for her leg spasms, but she said they won't give her any more. She admits she does not know when her last dose was. MD stopped in during session and educated on how medication for her legs has to be spaced out to not cause negative side effects.     Objective:      Bed mobility - Mod I -supine to sit  Transfers - CGA Sit to stand x 2  Ambulation - 100 feet CGA w/Rwx.     Exercise: seated LAQs, seated marches x 10 B. Standing calf raises x10 holding onto window seal.    Balance exercise: Sidestepping holding onto window seal 8 ft x 2  Sidestepping in front of window seal w/o UE support 8 ft x4  Walking forward, one hand on window seal 8ftx2   Amb 5 feet CGA/Min-A to walk back to Rwx    Pre: /52   HR got up to 128 w/ amb, pt reports no dizziness or SOB when asked.  Pos: /69, HR 85        Pain: 7 VAS B LE \"cramp/spasm\". Relieved by amb and sitting in chair  Education: Provided education on importance of mobility and skilled verbal / tactile cueing throughout intervention.      Assessment: Catalina Moreno presents with functional mobility impairments which indicate the need for skilled intervention. Pt presents with  balance deficits when unsupported, making her a high falls risk and unsafe to go home and live alone. Pt also has a history of falls. Continue balance exercises next session. Tolerating session today without incident. Will continue to follow and progress as tolerated.      Plan/Recommendations:   Pt would benefit from Inpatient Rehabilitation placement at discharge from facility and requires no DME at discharge.   Pt desires Inpatient Rehabilitation placement at discharge. Pt does not feel safe to return home due to weakness. Pt cooperative; agreeable to therapeutic recommendations and plan of care.      Basic Mobility 6-click:  Rolling:        "                     1 = Total, A lot = 2, A little = 3; 4 = None  Supine>Sit:                      1 = Total, A lot = 2, A little = 3; 4 = None   Sit>Stand with arms:       1 = Total, A lot = 2, A little = 3; 4 = None  Bed>Chair:                      1 = Total, A lot = 2, A little = 3; 4 = None  Ambulate in room:           1 = Total, A lot = 2, A little = 3; 4 = None  3-5 Steps with railin = Total, A lot = 2, A little = 3; 4 = None  Score: 17     Modified Jarratt: 4 = Moderately severe disability (Unable to attend to own bodily needs without assistance, and unable to walk unassisted)      Post-Tx Position: Up in Chair, Alarms activated and Call light and personal items within reach. SCDs applied, pt states they help her leg pain.  PPE: gloves, surgical mask, eyewear protection

## 2022-03-09 NOTE — THERAPY TREATMENT NOTE
Subjective: Pt agreeable to therapeutic plan of care.  Cognition: oriented to Person, Place, Time and Situation. Cont to demo difficulty with sequencing and safety.    Objective:     Bed Mobility: SBA  Functional Transfers: Min-A  Functional Ambulation: Min-A    Lower Body Dressing: Min-A  ADL Position: supported sitting  ADL Comments: socks    Pain: 5 VAS, BLE  Education: Provided education on importance of mobility and skilled verbal / tactile cueing throughout intervention.     Assessment: Catalina Moreno presents with ADL impairments below baseline abilities which indicate the need for continued skilled intervention while inpatient. Pt increasingly fatigued this date, c/o poor sleep quality due to LLE pain / muscle cramps.  Cont to be limited by elevated HR, though able to complete tasks at her own pace. Requires Min A for balance/safety, and can demo impaired safety awareness. Tolerating session today without incident. Will continue to follow and progress as tolerated.     Plan/Recommendations:   Pt would benefit from Inpatient Rehabilitation placement at discharge from facility.   Pt desires Inpatient Rehabilitation placement at discharge. Pt cooperative; agreeable to therapeutic recommendations and plan of care.     Modified Derek: 4 = Moderately severe disability (Unable to attend to own bodily needs without assistance, and unable to walk unassisted)     Post-Tx Position: Supine with HOB Elevated, Alarms activated and Call light and personal items within reach  PPE: gloves, surgical mask, eyewear protection

## 2022-03-09 NOTE — PROGRESS NOTES
Referring Provider: Hospitalist    Reason for follow-up: Palpitations     Patient Care Team:  Anayeli Costa APRN as PCP - General (Family Medicine)  Barrett Evans MD as Consulting Physician (Cardiology)    Subjective .  Patient is doing well without any symptoms    Objective  In bed comfortably     Review of Systems   Constitutional: Negative for fever and malaise/fatigue.   Cardiovascular: Negative for chest pain, dyspnea on exertion and palpitations.   Respiratory: Negative for cough and shortness of breath.    Skin: Negative for rash.   Gastrointestinal: Negative for abdominal pain, nausea and vomiting.   Neurological: Negative for focal weakness and headaches.   All other systems reviewed and are negative.      Baclofen, Codeine, Ibuprofen, Methocarbamol, Naproxen, Tizanidine hcl, and Tolmetin    Scheduled Meds:amiodarone, 200 mg, Oral, BID With Meals  atorvastatin, 10 mg, Oral, Nightly  bisacodyl, 10 mg, Rectal, Daily  digoxin, 125 mcg, Oral, Daily  enoxaparin, 1 mg/kg, Subcutaneous, Q12H  famotidine, 20 mg, Oral, Daily  furosemide, 40 mg, Intravenous, Daily  gabapentin, 600 mg, Oral, 4x Daily  levothyroxine, 88 mcg, Oral, Q AM  metoclopramide, 10 mg, Intravenous, 4x Daily AC & at Bedtime  metoprolol tartrate, 25 mg, Oral, Q12H  pantoprazole, 40 mg, Oral, Daily With Lunch  potassium chloride, 40 mEq, Oral, Daily  rOPINIRole, 0.25 mg, Oral, Nightly  sertraline, 50 mg, Oral, Nightly  sildenafil, 20 mg, Oral, BID  sodium chloride, 10 mL, Intravenous, Q12H  sodium chloride, 10 mL, Intravenous, Q12H  sodium chloride, 10 mL, Intravenous, Q12H  cyanocobalamin, 1,000 mcg, Oral, Daily      Continuous Infusions:amiodarone, 1 mg/min, Last Rate: 1 mg/min (03/07/22 1407)      PRN Meds:.cyclobenzaprine  •  HYDROcodone-acetaminophen  •  HYDROcodone-acetaminophen  •  influenza vaccine  •  melatonin  •  ondansetron **OR** ondansetron  •  sodium chloride  •  sodium chloride  •  sodium chloride  •  sodium  "chloride  •  sodium chloride  •  zolpidem        VITAL SIGNS  Vitals:    03/09/22 0228 03/09/22 0400 03/09/22 1141 03/09/22 1434   BP: 136/48 147/95 95/49 112/62   BP Location: Left arm Left arm Left arm Left arm   Patient Position: Lying Lying Lying Sitting   Pulse: 112 110 74 90   Resp: 11 15 12 15   Temp: 98.4 °F (36.9 °C) 98.4 °F (36.9 °C) 98.4 °F (36.9 °C) 98.1 °F (36.7 °C)   TempSrc: Oral Oral Oral Oral   SpO2:       Weight:  71.1 kg (156 lb 12 oz)     Height:           Flowsheet Rows    Flowsheet Row First Filed Value   Admission Height 165.1 cm (65\") Documented at 03/04/2022 1217   Admission Weight 71.7 kg (158 lb) Documented at 03/04/2022 1217           TELEMETRY: Atrial fibrillation    Physical Exam:  Constitutional:       Appearance: Well-developed.   Eyes:      General: No scleral icterus.     Conjunctiva/sclera: Conjunctivae normal.      Pupils: Pupils are equal, round, and reactive to light.   HENT:      Head: Normocephalic and atraumatic.   Neck:      Vascular: No carotid bruit or JVD.   Pulmonary:      Effort: Pulmonary effort is normal.      Breath sounds: Normal breath sounds. No wheezing. No rales.   Cardiovascular:      Normal rate. Regular rhythm.   Pulses:     Intact distal pulses.   Abdominal:      General: Bowel sounds are normal.      Palpations: Abdomen is soft.   Musculoskeletal: Normal range of motion.      Cervical back: Normal range of motion and neck supple. Skin:     General: Skin is warm and dry.      Findings: No rash.   Neurological:      Mental Status: Alert.      Comments: No focal deficits          Results Review:   I reviewed the patient's new clinical results.  Lab Results (last 24 hours)     Procedure Component Value Units Date/Time    Basic Metabolic Panel [[id number]]  (Normal) Collected: 03/09/22 0510    Specimen: Blood from Arm, Right Updated: 03/09/22 0558     Glucose 93 mg/dL      BUN 14 mg/dL      Creatinine 0.86 mg/dL      Sodium 140 mmol/L      Potassium 4.0 mmol/L  "     Chloride 103 mmol/L      CO2 28.0 mmol/L      Calcium 9.1 mg/dL      BUN/Creatinine Ratio 16.3     Anion Gap 9.0 mmol/L      eGFR 68.4 mL/min/1.73      Comment: National Kidney Foundation and American Society of Nephrology (ASN) Task Force recommended calculation based on the Chronic Kidney Disease Epidemiology Collaboration (CKD-EPI) equation refit without adjustment for race.       Narrative:      GFR Normal >60  Chronic Kidney Disease <60  Kidney Failure <15      Phosphorus [[id number]]  (Normal) Collected: 03/09/22 0510    Specimen: Blood from Arm, Right Updated: 03/09/22 0558     Phosphorus 3.0 mg/dL     Magnesium [[id number]]  (Normal) Collected: 03/09/22 0510    Specimen: Blood from Arm, Right Updated: 03/09/22 0558     Magnesium 2.0 mg/dL     CBC & Differential [[id number]]  (Abnormal) Collected: 03/09/22 0510    Specimen: Blood from Arm, Right Updated: 03/09/22 0537    Narrative:      The following orders were created for panel order CBC & Differential.  Procedure                               Abnormality         Status                     ---------                               -----------         ------                     CBC Auto Differential[[id number]]        Abnormal            Final result                 Please view results for these tests on the individual orders.    CBC Auto Differential [[id number]]  (Abnormal) Collected: 03/09/22 0510    Specimen: Blood from Arm, Right Updated: 03/09/22 0537     WBC 7.10 10*3/mm3      RBC 3.98 10*6/mm3      Hemoglobin 10.1 g/dL      Hematocrit 31.2 %      MCV 78.3 fL      MCH 25.3 pg      MCHC 32.3 g/dL      RDW 19.0 %      RDW-SD 52.5 fl      MPV 8.7 fL      Platelets 213 10*3/mm3      Neutrophil % 43.5 %      Lymphocyte % 40.4 %      Monocyte % 12.8 %      Eosinophil % 2.2 %      Basophil % 1.1 %      Neutrophils, Absolute 3.10 10*3/mm3      Lymphocytes, Absolute 2.90 10*3/mm3      Monocytes, Absolute 0.90 10*3/mm3      Eosinophils, Absolute 0.20 10*3/mm3       Basophils, Absolute 0.10 10*3/mm3      nRBC 0.0 /100 WBC           Imaging Results (Last 24 Hours)     Procedure Component Value Units Date/Time    XR Abdomen KUB [[id number]] Collected: 03/09/22 1454     Updated: 03/09/22 1458    Narrative:      DATE OF EXAM:  3/9/2022 12:00 PM     PROCEDURE:  XR ABDOMEN KUB-     INDICATIONS:  follow up gaseous distension; I48.91-Unspecified atrial fibrillation;  R07.9-Chest pain, unspecified; I50.33-Acute on chronic diastolic  (congestive) heart failure; M54.50-Low back pain, unspecified;  G89.29-Other chronic pain     COMPARISON:  KUB dated 3/8/2022     TECHNIQUE:   Single radiographic view of the abdomen was obtained.     FINDINGS:  There is partial visualization of PICC line with tip terminating at the  cavoatrial junction. There is decreased gaseous distention of the  stomach when compared to the prior examination. There is a nonspecific  bowel gas pattern with gas-filled nondilated loops of small bowel within  the right hemiabdomen. There is a nondilated gas-filled colon present.  There are no renal stones. There are right upper quadrant  cholecystectomy clips. There are degenerative changes of the lumbar  spine.       Impression:      1. Decreased gaseous distention of the stomach compared to the prior  examination.  2. Nonspecific nondilated gas-filled loops of small bowel and colon,  relatively similar to the prior exam.     Electronically Signed By-Leobardo Stein MD On:3/9/2022 2:56 PM  This report was finalized on [id number] by  Leobardo Stein MD.          EKG      I personally viewed and interpreted the patient's EKG/Telemetry data:    ECHOCARDIOGRAM:    STRESS MYOVIEW:    CARDIAC CATHETERIZATION:    OTHER:         Assessment/Plan     Principal Problem:    Atrial fibrillation with RVR (HCC)  Active Problems:    Primary fibromyalgia syndrome    Lumbar radiculopathy    Acute on chronic diastolic congestive heart failure (HCC)    Stage 2 chronic kidney disease     Restless legs syndrome    Hypothyroidism, unspecified    Pulmonary hypertension, unspecified (HCC)       Patient presented with shortness of breath and had atrial fibrillation with rapid response  Patient is currently on digoxin Toprol and is on amiodarone  Patient was on Eliquis at home which will be restarted  Patient had a congestive heart with EF of 45 to 50% and is stable  Patient also has chronic renal insufficiency.  Patient is on Lovenox and can be switched to either warfarin or Eliquis based on her renal function   I discussed the patients findings and my recommendations with patient and nurse    Flash Gonzalez MD  03/09/22  17:38 EST

## 2022-03-09 NOTE — PLAN OF CARE
Goal Outcome Evaluation:      Assessment: Catalina Moreno presents with functional mobility impairments which indicate the need for skilled intervention. Pt presents with  balance deficits when unsupported, making her a high falls risk and unsafe to go home and live alone. Pt also has a history of falls. Continue balance exercises next session. Tolerating session today without incident. Will continue to follow and progress as tolerated.      Plan/Recommendations:   Pt would benefit from Inpatient Rehabilitation placement at discharge from facility and requires no DME at discharge.   Pt desires Inpatient Rehabilitation placement at discharge. Pt does not feel safe to return home due to weakness. Pt cooperative; agreeable to therapeutic recommendations and plan of care.

## 2022-03-09 NOTE — PLAN OF CARE
Goal Outcome Evaluation:               Pt is A/Ox4. Pt was very restless this AM due to leg pain. Pt was feeling leg pain most of night despite previous RN attempt. Will continue to monitor.

## 2022-03-09 NOTE — PROGRESS NOTES
[facility] Medicine Services Daily Progress Note    Patient Name: Catalina Moreno  : 1940  MRN: [id number]  Primary Care Physician:  Anayeli Costa APRN  Date of admission: 3/4/2022      Subjective      Chief Complaint: Atrial fibrillation palpitations     Patient Reports persistent left lower extremity pain despite Norco.  Ultrasound lower extremities ordered.  Shows fluid collection in popliteal fossa.  Repeat abdominal x-ray ordered.  Atrial fibrillation with rate still not controlled.    ROS negative except as above.      Objective      Vitals:   Temp:  [98.1 °F (36.7 °C)-98.4 °F (36.9 °C)] 98.1 °F (36.7 °C)  Heart Rate:  [] 90  Resp:  [11-16] 15  BP: ()/(42-95) 112/62    Physical Exam  Vitals reviewed.   Constitutional:       Comments: Somewhat dusky appearing   Eyes:      Pupils: Pupils are equal, round, and reactive to light.   Cardiovascular:      Rate and Rhythm: Tachycardia present. Rhythm irregular.   Pulmonary:      Effort: Pulmonary effort is normal.   Abdominal:      General: Abdomen is flat.   Musculoskeletal:         General: Normal range of motion.      Cervical back: Normal range of motion.   Skin:     General: Skin is warm.      Capillary Refill: Capillary refill takes less than 2 seconds.   Neurological:      General: No focal deficit present.      Mental Status: She is alert.   Psychiatric:         Mood and Affect: Mood normal.        Result Review    Result Review:  I have personally reviewed the results from the time of this admission to 3/9/2022 14:39 EST and agree with these findings:  [x]  Laboratory  []  Microbiology  []  Radiology  []  EKG/Telemetry   []  Cardiology/Vascular   []  Pathology  []  Old records  []  Other:  Most notable findings include: BMP unremarkable, electrolytes stable             Assessment/Plan       Brief Patient Summary:  Catalina Moreno is a 81 y.o. female who presents with A. fib RVR        atorvastatin, 10 mg,  Oral, Nightly  digoxin, 125 mcg, Oral, Daily  enoxaparin, 1 mg/kg, Subcutaneous, Q12H  famotidine, 20 mg, Oral, Daily  furosemide, 40 mg, Intravenous, Daily  gabapentin, 600 mg, Oral, 4x Daily  levothyroxine, 88 mcg, Oral, Q AM  metoprolol tartrate, 25 mg, Oral, Q12H  pantoprazole, 40 mg, Oral, Daily With Lunch  potassium chloride, 40 mEq, Oral, Daily  rOPINIRole, 0.25 mg, Oral, Nightly  sertraline, 50 mg, Oral, Nightly  sildenafil, 20 mg, Oral, BID  sodium chloride, 10 mL, Intravenous, Q12H  sodium chloride, 10 mL, Intravenous, Q12H  sodium chloride, 10 mL, Intravenous, Q12H  cyanocobalamin, 1,000 mcg, Oral, Daily        amiodarone, 1 mg/min, Last Rate: 1 mg/min (03/07/22 1407)     Active Hospital Problems:          Active Hospital Problems     Diagnosis     • **Atrial fibrillation with RVR ([facility])     • Pulmonary hypertension, unspecified ([facility])     • Restless legs syndrome     • Hypothyroidism, unspecified     • Stage 2 chronic kidney disease     • Acute on chronic diastolic congestive heart failure (HCC)     • Lumbar radiculopathy     • Primary fibromyalgia syndrome        Plan:   A. fib with RVR  -EKG shows A. fib with RVR at a rate of 158 without obvious acute ST changes and a QTC of 495 ms on admission.  Now improved in the low 400s  -Continue iv amiodarone switched to p.o. per cardiology  - therapeutic dose lovenox sc.    Patient should be able to afford $45 co-pay for Eliquis  -Continue beta-blocker  -TSH: 3.530  -Troponin less than 0.010, trend  -Continue telemetry  -Lovenox twice daily for now, consider alternative p.o. anticoagulation or Case management consult given patient's inability to afford Eliquis if it is more than $45 per month  - consulted cardiology service.  Appreciate their assistance     Acute on chronic heart failure systolic in nature, 2D echo revealed ejection fraction around 40 to 45%  -proBNP: 8657.0 with pulmonary edema noted on CT of chest as well as CT of abdomen  -Echocardiogram from  7/26/2021 showed an EF of 60% with normal left ventricular systolic function with moderate aortic valve regurgitation  -2D echo finding reviewed revealing ejection fraction around 40 to 45%  -40 mg Lasix IV daily, can change to p.o. in a day or 2  -2 g sodium diet  -Daily weight  -Continue beta-blocker     Anemia  -Hemoglobin: 10 and above thus far  Monitor hemoglobin closely     History of CKD stage II  -Serum creatinine: 0.76 with an EGFR of 79.3  -Monitor while admitted     Hypothyroidism  -Levothyroxine     RLS  -Requip     Chronic pain/fibromyalgia  -Norco and Lyrica     Depression/anxiety  -Zoloft     DVT prophylaxis:  Medical DVT prophylaxis orders are present.     Nausea vomiting abdominal pain abdominal x-ray ordered.  EKG repeat ordered for QTC check for Reglan and Phenergan.  Nausea much better on March 9    Left lower extremity pain  Lower extremity Doppler pending        CODE STATUS:    Code Status (Patient has no pulse and is not breathing): CPR (Attempt to Resuscitate)  Medical Interventions (Patient has pulse or is breathing): Full Support        Disposition:  I expect patient to be discharged as per clinical course.     This patient has been examined wearing appropriate Personal Protective Equipment and discussed with hospital infection control department. 03/09/22      Electronically signed by Dom Murray MD, 03/09/22, 14:39 EST.  [facility] Hospitalist Team

## 2022-03-09 NOTE — PLAN OF CARE
Problem: Adult Inpatient Plan of Care  Goal: Plan of Care Review  Outcome: Ongoing, Progressing  Goal: Patient-Specific Goal (Individualized)  Outcome: Ongoing, Progressing  Goal: Absence of Hospital-Acquired Illness or Injury  Outcome: Ongoing, Progressing  Intervention: Identify and Manage Fall Risk  Recent Flowsheet Documentation  Taken 3/9/2022 0000 by Bertha Mcadams RN  Safety Promotion/Fall Prevention:   assistive device/personal items within reach   activity supervised   clutter free environment maintained   nonskid shoes/slippers when out of bed   room organization consistent   safety round/check completed  Taken 3/8/2022 2200 by Bertha Mcadams RN  Safety Promotion/Fall Prevention:   activity supervised   assistive device/personal items within reach   clutter free environment maintained   nonskid shoes/slippers when out of bed   room organization consistent   safety round/check completed  Taken 3/8/2022 2000 by Bertha Mcadams RN  Safety Promotion/Fall Prevention:   activity supervised   assistive device/personal items within reach   clutter free environment maintained   safety round/check completed   room organization consistent   nonskid shoes/slippers when out of bed  Intervention: Prevent Skin Injury  Recent Flowsheet Documentation  Taken 3/8/2022 2000 by Bertha Mcadams RN  Skin Protection: adhesive use limited  Intervention: Prevent and Manage VTE (Venous Thromboembolism) Risk  Recent Flowsheet Documentation  Taken 3/9/2022 0000 by Bertha Mcadams RN  VTE Prevention/Management: sequential compression devices on  Taken 3/8/2022 2000 by Bertha Mcadams RN  VTE Prevention/Management: sequential compression devices on  Intervention: Prevent Infection  Recent Flowsheet Documentation  Taken 3/9/2022 0000 by Bertha Mcadams RN  Infection Prevention:   single patient room provided   personal protective equipment utilized   hand hygiene promoted  Taken 3/8/2022 2200 by  Bertha Mcadams RN  Infection Prevention:   personal protective equipment utilized   hand hygiene promoted   single patient room provided  Taken 3/8/2022 2000 by Bertha Mcadams RN  Infection Prevention:   single patient room provided   personal protective equipment utilized   hand hygiene promoted  Goal: Optimal Comfort and Wellbeing  Outcome: Ongoing, Progressing  Intervention: Monitor Pain and Promote Comfort  Recent Flowsheet Documentation  Taken 3/9/2022 0000 by Bertha Mcadams RN  Pain Management Interventions: massage provided  Taken 3/8/2022 2330 by Bertha Mcadams RN  Pain Management Interventions:   massage provided   see MAR  Goal: Readiness for Transition of Care  Outcome: Ongoing, Progressing     Problem: Pain Acute  Goal: Acceptable Pain Control and Functional Ability  Outcome: Ongoing, Progressing  Intervention: Prevent or Manage Pain  Recent Flowsheet Documentation  Taken 3/9/2022 0000 by Bertha Mcadams RN  Medication Review/Management: medications reviewed  Taken 3/8/2022 2200 by Bertha Mcadams RN  Medication Review/Management: medications reviewed  Taken 3/8/2022 2000 by Bertha Mcadams RN  Sensory Stimulation Regulation: care clustered  Bowel Elimination Promotion: adequate fluid intake promoted  Medication Review/Management: medications reviewed  Intervention: Develop Pain Management Plan  Recent Flowsheet Documentation  Taken 3/9/2022 0000 by Bertha Mcadams RN  Pain Management Interventions: massage provided  Taken 3/8/2022 2330 by Bertha Mcadams RN  Pain Management Interventions:   massage provided   see MAR  Intervention: Optimize Psychosocial Wellbeing  Recent Flowsheet Documentation  Taken 3/8/2022 2000 by Bertha Mcadams RN  Supportive Measures: active listening utilized  Goal: Acceptable Pain Control and Functional Ability  Outcome: Ongoing, Progressing  Intervention: Prevent or Manage Pain  Recent Flowsheet Documentation  Taken 3/9/2022 0000 by  Bertha Mcadams RN  Medication Review/Management: medications reviewed  Taken 3/8/2022 2200 by Bertha Mcadams RN  Medication Review/Management: medications reviewed  Taken 3/8/2022 2000 by Bertha Mcadams RN  Sensory Stimulation Regulation: care clustered  Bowel Elimination Promotion: adequate fluid intake promoted  Medication Review/Management: medications reviewed  Intervention: Develop Pain Management Plan  Recent Flowsheet Documentation  Taken 3/9/2022 0000 by Bertha Mcadams RN  Pain Management Interventions: massage provided  Taken 3/8/2022 2330 by Bertha Mcadams RN  Pain Management Interventions:   massage provided   see MAR  Intervention: Optimize Psychosocial Wellbeing  Recent Flowsheet Documentation  Taken 3/8/2022 2000 by Bertha Mcadams RN  Supportive Measures: active listening utilized     Problem: Asthma Comorbidity  Goal: Maintenance of Asthma Control  Outcome: Ongoing, Progressing  Intervention: Maintain Asthma Symptom Control  Recent Flowsheet Documentation  Taken 3/9/2022 0000 by Bertha Mcadams RN  Medication Review/Management: medications reviewed  Taken 3/8/2022 2200 by Bertha Mcadams RN  Medication Review/Management: medications reviewed  Taken 3/8/2022 2000 by Bertha Mcadams RN  Medication Review/Management: medications reviewed     Problem: Hypertension Comorbidity  Goal: Blood Pressure in Desired Range  Outcome: Ongoing, Progressing  Intervention: Maintain Blood Pressure Management  Recent Flowsheet Documentation  Taken 3/9/2022 0000 by Bertha Mcadams RN  Medication Review/Management: medications reviewed  Taken 3/8/2022 2200 by Bertha Mcadams RN  Medication Review/Management: medications reviewed  Taken 3/8/2022 2000 by Bertha Mcadams RN  Medication Review/Management: medications reviewed     Problem: Skin Injury Risk Increased  Goal: Skin Health and Integrity  Outcome: Ongoing, Progressing  Intervention: Optimize Skin  Protection  Recent Flowsheet Documentation  Taken 3/8/2022 2000 by Bertha Mcadams RN  Pressure Reduction Techniques: frequent weight shift encouraged  Pressure Reduction Devices: pressure-redistributing mattress utilized  Skin Protection: adhesive use limited     Problem: Fall Injury Risk  Goal: Absence of Fall and Fall-Related Injury  Outcome: Ongoing, Progressing  Intervention: Identify and Manage Contributors  Recent Flowsheet Documentation  Taken 3/9/2022 0000 by Bertha Mcadams RN  Medication Review/Management: medications reviewed  Taken 3/8/2022 2200 by Bertha Mcadams RN  Medication Review/Management: medications reviewed  Taken 3/8/2022 2000 by Bertha Mcadams RN  Medication Review/Management: medications reviewed  Intervention: Promote Injury-Free Environment  Recent Flowsheet Documentation  Taken 3/9/2022 0000 by Bertha Mcadams RN  Safety Promotion/Fall Prevention:   assistive device/personal items within reach   activity supervised   clutter free environment maintained   nonskid shoes/slippers when out of bed   room organization consistent   safety round/check completed  Taken 3/8/2022 2200 by Bertha Mcadams RN  Safety Promotion/Fall Prevention:   activity supervised   assistive device/personal items within reach   clutter free environment maintained   nonskid shoes/slippers when out of bed   room organization consistent   safety round/check completed  Taken 3/8/2022 2000 by Bertha Mcadams RN  Safety Promotion/Fall Prevention:   activity supervised   assistive device/personal items within reach   clutter free environment maintained   safety round/check completed   room organization consistent   nonskid shoes/slippers when out of bed  Goal: Absence of Fall and Fall-Related Injury  Outcome: Ongoing, Progressing  Intervention: Identify and Manage Contributors  Recent Flowsheet Documentation  Taken 3/9/2022 0000 by Bertha Mcadams RN  Medication Review/Management:  medications reviewed  Taken 3/8/2022 2200 by Bertha Mcadams RN  Medication Review/Management: medications reviewed  Taken 3/8/2022 2000 by Bertha Mcadams RN  Medication Review/Management: medications reviewed  Intervention: Promote Injury-Free Environment  Recent Flowsheet Documentation  Taken 3/9/2022 0000 by Bertha Mcadams RN  Safety Promotion/Fall Prevention:   assistive device/personal items within reach   activity supervised   clutter free environment maintained   nonskid shoes/slippers when out of bed   room organization consistent   safety round/check completed  Taken 3/8/2022 2200 by Bertha Mcadams RN  Safety Promotion/Fall Prevention:   activity supervised   assistive device/personal items within reach   clutter free environment maintained   nonskid shoes/slippers when out of bed   room organization consistent   safety round/check completed  Taken 3/8/2022 2000 by Bertha Mcadams RN  Safety Promotion/Fall Prevention:   activity supervised   assistive device/personal items within reach   clutter free environment maintained   safety round/check completed   room organization consistent   nonskid shoes/slippers when out of bed  Goal: Absence of Fall and Fall-Related Injury  Outcome: Ongoing, Progressing  Intervention: Identify and Manage Contributors  Recent Flowsheet Documentation  Taken 3/9/2022 0000 by Bertha Mcadams RN  Medication Review/Management: medications reviewed  Taken 3/8/2022 2200 by Bertha Mcadams RN  Medication Review/Management: medications reviewed  Taken 3/8/2022 2000 by Bertha Mcadams RN  Medication Review/Management: medications reviewed  Intervention: Promote Injury-Free Environment  Recent Flowsheet Documentation  Taken 3/9/2022 0000 by Bertha Mcadams RN  Safety Promotion/Fall Prevention:   assistive device/personal items within reach   activity supervised   clutter free environment maintained   nonskid shoes/slippers when out of bed   room  organization consistent   safety round/check completed  Taken 3/8/2022 2200 by Bertha Mcadams RN  Safety Promotion/Fall Prevention:   activity supervised   assistive device/personal items within reach   clutter free environment maintained   nonskid shoes/slippers when out of bed   room organization consistent   safety round/check completed  Taken 3/8/2022 2000 by Bertha Mcadams RN  Safety Promotion/Fall Prevention:   activity supervised   assistive device/personal items within reach   clutter free environment maintained   safety round/check completed   room organization consistent   nonskid shoes/slippers when out of bed  Goal: Absence of Fall and Fall-Related Injury  Outcome: Ongoing, Progressing  Intervention: Identify and Manage Contributors  Recent Flowsheet Documentation  Taken 3/9/2022 0000 by Bertha Mcadams RN  Medication Review/Management: medications reviewed  Taken 3/8/2022 2200 by Bertha Mcadams RN  Medication Review/Management: medications reviewed  Taken 3/8/2022 2000 by Bertha Mcadams RN  Medication Review/Management: medications reviewed  Intervention: Promote Injury-Free Environment  Recent Flowsheet Documentation  Taken 3/9/2022 0000 by Bertha Mcadams RN  Safety Promotion/Fall Prevention:   assistive device/personal items within reach   activity supervised   clutter free environment maintained   nonskid shoes/slippers when out of bed   room organization consistent   safety round/check completed  Taken 3/8/2022 2200 by Bertha Mcadams RN  Safety Promotion/Fall Prevention:   activity supervised   assistive device/personal items within reach   clutter free environment maintained   nonskid shoes/slippers when out of bed   room organization consistent   safety round/check completed  Taken 3/8/2022 2000 by Bertha Mcadams RN  Safety Promotion/Fall Prevention:   activity supervised   assistive device/personal items within reach   clutter free environment maintained    safety round/check completed   room organization consistent   nonskid shoes/slippers when out of bed  Goal: Absence of Fall and Fall-Related Injury  Outcome: Ongoing, Progressing  Intervention: Identify and Manage Contributors  Recent Flowsheet Documentation  Taken 3/9/2022 0000 by Bertha Mcadams RN  Medication Review/Management: medications reviewed  Taken 3/8/2022 2200 by Bertha Mcadams RN  Medication Review/Management: medications reviewed  Taken 3/8/2022 2000 by Bertha Mcadams RN  Medication Review/Management: medications reviewed  Intervention: Promote Injury-Free Environment  Recent Flowsheet Documentation  Taken 3/9/2022 0000 by Bertha Mcadams RN  Safety Promotion/Fall Prevention:   assistive device/personal items within reach   activity supervised   clutter free environment maintained   nonskid shoes/slippers when out of bed   room organization consistent   safety round/check completed  Taken 3/8/2022 2200 by Bertha Mcadams RN  Safety Promotion/Fall Prevention:   activity supervised   assistive device/personal items within reach   clutter free environment maintained   nonskid shoes/slippers when out of bed   room organization consistent   safety round/check completed  Taken 3/8/2022 2000 by Bertha Mcadams RN  Safety Promotion/Fall Prevention:   activity supervised   assistive device/personal items within reach   clutter free environment maintained   safety round/check completed   room organization consistent   nonskid shoes/slippers when out of bed     Problem: Dysrhythmia  Goal: Normalized Cardiac Rhythm  Outcome: Ongoing, Progressing  Intervention: Monitor and Manage Cardiac Rhythm Effect  Recent Flowsheet Documentation  Taken 3/9/2022 0000 by Bertha Mcadams RN  VTE Prevention/Management: sequential compression devices on  Taken 3/8/2022 2000 by Bertha Mcadams RN  VTE Prevention/Management: sequential compression devices on   Goal Outcome Evaluation:

## 2022-03-10 VITALS
HEART RATE: 93 BPM | OXYGEN SATURATION: 91 % | BODY MASS INDEX: 26.12 KG/M2 | WEIGHT: 156.75 LBS | SYSTOLIC BLOOD PRESSURE: 92 MMHG | HEIGHT: 65 IN | TEMPERATURE: 98.6 F | DIASTOLIC BLOOD PRESSURE: 53 MMHG | RESPIRATION RATE: 17 BRPM

## 2022-03-10 LAB
ANION GAP SERPL CALCULATED.3IONS-SCNC: 11 MMOL/L (ref 5–15)
BASOPHILS # BLD AUTO: 0.1 10*3/MM3 (ref 0–0.2)
BASOPHILS NFR BLD AUTO: 2.3 % (ref 0–1.5)
BUN SERPL-MCNC: 15 MG/DL (ref 8–23)
BUN/CREAT SERPL: 14.7 (ref 7–25)
CALCIUM SPEC-SCNC: 9.2 MG/DL (ref 8.6–10.5)
CHLORIDE SERPL-SCNC: 101 MMOL/L (ref 98–107)
CO2 SERPL-SCNC: 28 MMOL/L (ref 22–29)
CREAT SERPL-MCNC: 1.02 MG/DL (ref 0.57–1)
DEPRECATED RDW RBC AUTO: 54.7 FL (ref 37–54)
EGFRCR SERPLBLD CKD-EPI 2021: 55.7 ML/MIN/1.73
EOSINOPHIL # BLD AUTO: 0.3 10*3/MM3 (ref 0–0.4)
EOSINOPHIL NFR BLD AUTO: 5.5 % (ref 0.3–6.2)
ERYTHROCYTE [DISTWIDTH] IN BLOOD BY AUTOMATED COUNT: 19.8 % (ref 12.3–15.4)
GLUCOSE SERPL-MCNC: 98 MG/DL (ref 65–99)
HCT VFR BLD AUTO: 34.1 % (ref 34–46.6)
HGB BLD-MCNC: 10.7 G/DL (ref 12–15.9)
LYMPHOCYTES # BLD AUTO: 2.8 10*3/MM3 (ref 0.7–3.1)
LYMPHOCYTES NFR BLD AUTO: 45.9 % (ref 19.6–45.3)
MAGNESIUM SERPL-MCNC: 2.1 MG/DL (ref 1.6–2.4)
MCH RBC QN AUTO: 24.7 PG (ref 26.6–33)
MCHC RBC AUTO-ENTMCNC: 31.5 G/DL (ref 31.5–35.7)
MCV RBC AUTO: 78.6 FL (ref 79–97)
MONOCYTES # BLD AUTO: 0.6 10*3/MM3 (ref 0.1–0.9)
MONOCYTES NFR BLD AUTO: 10.6 % (ref 5–12)
NEUTROPHILS NFR BLD AUTO: 2.2 10*3/MM3 (ref 1.7–7)
NEUTROPHILS NFR BLD AUTO: 35.7 % (ref 42.7–76)
NRBC BLD AUTO-RTO: 0.1 /100 WBC (ref 0–0.2)
PHOSPHATE SERPL-MCNC: 4.1 MG/DL (ref 2.5–4.5)
PLATELET # BLD AUTO: 229 10*3/MM3 (ref 140–450)
PMV BLD AUTO: 8.5 FL (ref 6–12)
POTASSIUM SERPL-SCNC: 4.7 MMOL/L (ref 3.5–5.2)
RBC # BLD AUTO: 4.34 10*6/MM3 (ref 3.77–5.28)
SODIUM SERPL-SCNC: 140 MMOL/L (ref 136–145)
WBC NRBC COR # BLD: 6.1 10*3/MM3 (ref 3.4–10.8)

## 2022-03-10 PROCEDURE — 83735 ASSAY OF MAGNESIUM: CPT | Performed by: INTERNAL MEDICINE

## 2022-03-10 PROCEDURE — 84100 ASSAY OF PHOSPHORUS: CPT | Performed by: INTERNAL MEDICINE

## 2022-03-10 PROCEDURE — 25010000002 FUROSEMIDE PER 20 MG: Performed by: PHYSICIAN ASSISTANT

## 2022-03-10 PROCEDURE — 99239 HOSP IP/OBS DSCHRG MGMT >30: CPT | Performed by: INTERNAL MEDICINE

## 2022-03-10 PROCEDURE — 25010000002 INFLUENZA VAC SPLIT QUAD 0.5 ML SUSPENSION PREFILLED SYRINGE: Performed by: HOSPITALIST

## 2022-03-10 PROCEDURE — 85025 COMPLETE CBC W/AUTO DIFF WBC: CPT | Performed by: INTERNAL MEDICINE

## 2022-03-10 PROCEDURE — 90686 IIV4 VACC NO PRSV 0.5 ML IM: CPT | Performed by: HOSPITALIST

## 2022-03-10 PROCEDURE — 99232 SBSQ HOSP IP/OBS MODERATE 35: CPT | Performed by: INTERNAL MEDICINE

## 2022-03-10 PROCEDURE — 25010000002 METOCLOPRAMIDE PER 10 MG: Performed by: INTERNAL MEDICINE

## 2022-03-10 PROCEDURE — 80048 BASIC METABOLIC PNL TOTAL CA: CPT | Performed by: INTERNAL MEDICINE

## 2022-03-10 PROCEDURE — G0008 ADMIN INFLUENZA VIRUS VAC: HCPCS | Performed by: HOSPITALIST

## 2022-03-10 RX ORDER — PREGABALIN 75 MG/1
75 CAPSULE ORAL 2 TIMES DAILY
Qty: 60 CAPSULE | Refills: 1 | Status: ON HOLD | OUTPATIENT
Start: 2022-03-10 | End: 2022-07-25

## 2022-03-10 RX ORDER — CYCLOBENZAPRINE HCL 5 MG
5 TABLET ORAL 2 TIMES DAILY PRN
Qty: 20 TABLET | Refills: 0 | Status: SHIPPED | OUTPATIENT
Start: 2022-03-10 | End: 2022-03-13

## 2022-03-10 RX ORDER — SILDENAFIL CITRATE 20 MG/1
20 TABLET ORAL 2 TIMES DAILY
Qty: 60 TABLET | Refills: 0 | Status: ON HOLD | OUTPATIENT
Start: 2022-03-10 | End: 2022-08-31

## 2022-03-10 RX ORDER — HYDROCODONE BITARTRATE AND ACETAMINOPHEN 7.5; 325 MG/1; MG/1
1 TABLET ORAL 4 TIMES DAILY PRN
Qty: 20 TABLET | Refills: 0 | Status: SHIPPED | OUTPATIENT
Start: 2022-03-10 | End: 2022-03-13

## 2022-03-10 RX ORDER — AMIODARONE HYDROCHLORIDE 200 MG/1
200 TABLET ORAL 2 TIMES DAILY WITH MEALS
Qty: 60 TABLET | Refills: 1
Start: 2022-03-10 | End: 2022-07-26 | Stop reason: SDUPTHER

## 2022-03-10 RX ORDER — DIGOXIN 125 MCG
125 TABLET ORAL
Qty: 30 TABLET | Refills: 0
Start: 2022-03-10 | End: 2022-07-26 | Stop reason: SDUPTHER

## 2022-03-10 RX ADMIN — METOPROLOL TARTRATE 25 MG: 25 TABLET, FILM COATED ORAL at 08:21

## 2022-03-10 RX ADMIN — Medication 10 ML: at 08:27

## 2022-03-10 RX ADMIN — METOCLOPRAMIDE HYDROCHLORIDE 10 MG: 5 INJECTION INTRAMUSCULAR; INTRAVENOUS at 08:21

## 2022-03-10 RX ADMIN — CYCLOBENZAPRINE 5 MG: 10 TABLET, FILM COATED ORAL at 11:16

## 2022-03-10 RX ADMIN — LEVOTHYROXINE SODIUM 88 MCG: 0.09 TABLET ORAL at 08:22

## 2022-03-10 RX ADMIN — FAMOTIDINE 20 MG: 20 TABLET, FILM COATED ORAL at 08:22

## 2022-03-10 RX ADMIN — Medication 10 ML: at 08:25

## 2022-03-10 RX ADMIN — PANTOPRAZOLE SODIUM 40 MG: 40 TABLET, DELAYED RELEASE ORAL at 11:16

## 2022-03-10 RX ADMIN — POTASSIUM CHLORIDE 40 MEQ: 1500 TABLET, EXTENDED RELEASE ORAL at 08:21

## 2022-03-10 RX ADMIN — CYANOCOBALAMIN TAB 1000 MCG 1000 MCG: 1000 TAB at 08:22

## 2022-03-10 RX ADMIN — INFLUENZA VIRUS VACCINE 0.5 ML: 15; 15; 15; 15 SUSPENSION INTRAMUSCULAR at 11:33

## 2022-03-10 RX ADMIN — GABAPENTIN 600 MG: 600 TABLET, FILM COATED ORAL at 08:21

## 2022-03-10 RX ADMIN — HYDROCODONE BITARTRATE AND ACETAMINOPHEN 1 TABLET: 7.5; 325 TABLET ORAL at 11:16

## 2022-03-10 RX ADMIN — DIGOXIN 125 MCG: 125 TABLET ORAL at 11:16

## 2022-03-10 RX ADMIN — GABAPENTIN 600 MG: 600 TABLET, FILM COATED ORAL at 11:16

## 2022-03-10 RX ADMIN — AMIODARONE HYDROCHLORIDE 200 MG: 200 TABLET ORAL at 08:21

## 2022-03-10 RX ADMIN — METOCLOPRAMIDE HYDROCHLORIDE 10 MG: 5 INJECTION INTRAMUSCULAR; INTRAVENOUS at 11:16

## 2022-03-10 RX ADMIN — APIXABAN 5 MG: 5 TABLET, FILM COATED ORAL at 08:22

## 2022-03-10 RX ADMIN — FUROSEMIDE 40 MG: 10 INJECTION, SOLUTION INTRAVENOUS at 08:21

## 2022-03-10 RX ADMIN — SILDENAFIL 20 MG: 20 TABLET, FILM COATED ORAL at 08:21

## 2022-03-10 NOTE — PROGRESS NOTES
"Referring Provider: Hospitalist    Reason for follow-up: Palpitations     Patient Care Team:  Anayeli Costa APRN as PCP - General (Family Medicine)  Barrett Evans MD as Consulting Physician (Cardiology)    Subjective .  Patient is doing well without any symptoms    Objective  In bed comfortably     Review of Systems   Constitutional: Negative for fever and malaise/fatigue.   Cardiovascular: Negative for chest pain, dyspnea on exertion and palpitations.   Respiratory: Negative for cough and shortness of breath.    Skin: Negative for rash.   Gastrointestinal: Negative for abdominal pain, nausea and vomiting.   Neurological: Negative for focal weakness and headaches.   All other systems reviewed and are negative.      Baclofen, Codeine, Ibuprofen, Methocarbamol, Naproxen, Tizanidine hcl, and Tolmetin    Scheduled Meds:  Continuous Infusions:No current facility-administered medications for this encounter.    PRN Meds:.        VITAL SIGNS  Vitals:    03/09/22 2341 03/10/22 0821 03/10/22 1103 03/10/22 1452   BP: 92/44 136/72 127/47 92/53   BP Location: Right arm  Left arm Left arm   Patient Position: Lying   Lying   Pulse: 103 (!) 124 84 93   Resp: 11  13 17   Temp: 98.4 °F (36.9 °C)  98.5 °F (36.9 °C) 98.6 °F (37 °C)   TempSrc: Oral  Oral Oral   SpO2:   97% 91%   Weight:       Height:           Flowsheet Rows    Flowsheet Row First Filed Value   Admission Height 165.1 cm (65\") Documented at 03/04/2022 1217   Admission Weight 71.7 kg (158 lb) Documented at 03/04/2022 1217           TELEMETRY: Atrial fibrillation    Physical Exam:  Constitutional:       Appearance: Well-developed.   Eyes:      General: No scleral icterus.     Conjunctiva/sclera: Conjunctivae normal.      Pupils: Pupils are equal, round, and reactive to light.   HENT:      Head: Normocephalic and atraumatic.   Neck:      Vascular: No carotid bruit or JVD.   Pulmonary:      Effort: Pulmonary effort is normal.      Breath sounds: Normal " breath sounds. No wheezing. No rales.   Cardiovascular:      Normal rate. Regular rhythm.   Pulses:     Intact distal pulses.   Abdominal:      General: Bowel sounds are normal.      Palpations: Abdomen is soft.   Musculoskeletal: Normal range of motion.      Cervical back: Normal range of motion and neck supple. Skin:     General: Skin is warm and dry.      Findings: No rash.   Neurological:      Mental Status: Alert.      Comments: No focal deficits          Results Review:   I reviewed the patient's new clinical results.  Lab Results (last 24 hours)     Procedure Component Value Units Date/Time    Phosphorus [[id number]]  (Normal) Collected: 03/10/22 0346    Specimen: Blood Updated: 03/10/22 0509     Phosphorus 4.1 mg/dL     Magnesium [[id number]]  (Normal) Collected: 03/10/22 0346    Specimen: Blood Updated: 03/10/22 0455     Magnesium 2.1 mg/dL     Basic Metabolic Panel [[id number]]  (Abnormal) Collected: 03/10/22 0346    Specimen: Blood Updated: 03/10/22 0455     Glucose 98 mg/dL      BUN 15 mg/dL      Creatinine 1.02 mg/dL      Sodium 140 mmol/L      Potassium 4.7 mmol/L      Chloride 101 mmol/L      CO2 28.0 mmol/L      Calcium 9.2 mg/dL      BUN/Creatinine Ratio 14.7     Anion Gap 11.0 mmol/L      eGFR 55.7 mL/min/1.73      Comment: National Kidney Foundation and American Society of Nephrology (ASN) Task Force recommended calculation based on the Chronic Kidney Disease Epidemiology Collaboration (CKD-EPI) equation refit without adjustment for race.       Narrative:      GFR Normal >60  Chronic Kidney Disease <60  Kidney Failure <15      CBC & Differential [[id number]]  (Abnormal) Collected: 03/10/22 0346    Specimen: Blood Updated: 03/10/22 0412    Narrative:      The following orders were created for panel order CBC & Differential.  Procedure                               Abnormality         Status                     ---------                               -----------         ------                     CBC  Auto Differential[[id number]]        Abnormal            Final result                 Please view results for these tests on the individual orders.    CBC Auto Differential [[id number]]  (Abnormal) Collected: 03/10/22 0346    Specimen: Blood Updated: 03/10/22 0412     WBC 6.10 10*3/mm3      RBC 4.34 10*6/mm3      Hemoglobin 10.7 g/dL      Hematocrit 34.1 %      MCV 78.6 fL      MCH 24.7 pg      MCHC 31.5 g/dL      RDW 19.8 %      RDW-SD 54.7 fl      MPV 8.5 fL      Platelets 229 10*3/mm3      Neutrophil % 35.7 %      Lymphocyte % 45.9 %      Monocyte % 10.6 %      Eosinophil % 5.5 %      Basophil % 2.3 %      Neutrophils, Absolute 2.20 10*3/mm3      Lymphocytes, Absolute 2.80 10*3/mm3      Monocytes, Absolute 0.60 10*3/mm3      Eosinophils, Absolute 0.30 10*3/mm3      Basophils, Absolute 0.10 10*3/mm3      nRBC 0.1 /100 WBC           Imaging Results (Last 24 Hours)     ** No results found for the last 24 hours. **          EKG      I personally viewed and interpreted the patient's EKG/Telemetry data:    ECHOCARDIOGRAM:    STRESS MYOVIEW:    CARDIAC CATHETERIZATION:    OTHER:         Assessment/Plan     Principal Problem:    Atrial fibrillation with RVR ([facility])  Active Problems:    Primary fibromyalgia syndrome    Lumbar radiculopathy    Acute on chronic diastolic congestive heart failure (HCC)    Stage 2 chronic kidney disease    Restless legs syndrome    Hypothyroidism, unspecified    Pulmonary hypertension, unspecified ([facility])       Patient presented with shortness of breath and had atrial fibrillation with rapid response  Patient is currently on digoxin Toprol and is on amiodarone  Patient was on Eliquis at home which will be restarted  Patient had a congestive heart with EF of 45 to 50% and is stable  Patient also has chronic renal insufficiency.  Patient is on Eliquis for anticoagulation stable  We will follow her as an outpatient  I discussed the patients findings and my recommendations with patient and  nurse    Flash Gonzalez MD  03/10/22  18:31 EST

## 2022-03-10 NOTE — PLAN OF CARE
Problem: Adult Inpatient Plan of Care  Goal: Plan of Care Review  Recent Flowsheet Documentation  Taken 3/10/2022 0409 by Delphine Thomas LPN  Progress: improving  Plan of Care Reviewed With: patient  Goal: Absence of Hospital-Acquired Illness or Injury  Intervention: Identify and Manage Fall Risk  Recent Flowsheet Documentation  Taken 3/10/2022 0200 by Delphine Thomas LPN  Safety Promotion/Fall Prevention:   safety round/check completed   room organization consistent   nonskid shoes/slippers when out of bed   lighting adjusted   fall prevention program maintained   clutter free environment maintained   assistive device/personal items within reach   activity supervised  Taken 3/10/2022 0000 by Delphine Thomas LPN  Safety Promotion/Fall Prevention:   safety round/check completed   room organization consistent   nonskid shoes/slippers when out of bed   lighting adjusted   fall prevention program maintained   clutter free environment maintained   assistive device/personal items within reach   activity supervised  Taken 3/9/2022 2200 by Delphine Thomas LPN  Safety Promotion/Fall Prevention:   safety round/check completed   room organization consistent   nonskid shoes/slippers when out of bed   lighting adjusted   fall prevention program maintained   clutter free environment maintained   assistive device/personal items within reach   activity supervised  Taken 3/9/2022 2000 by Delphine Thomas LPN  Safety Promotion/Fall Prevention:   activity supervised   assistive device/personal items within reach   clutter free environment maintained   fall prevention program maintained   lighting adjusted   nonskid shoes/slippers when out of bed   room organization consistent   safety round/check completed  Intervention: Prevent Skin Injury  Recent Flowsheet Documentation  Taken 3/9/2022 2000 by Delphine Thomas LPN  Skin Protection: adhesive use limited  Intervention: Prevent and Manage VTE (venous thromboembolism)  Risk  Recent Flowsheet Documentation  Taken 3/10/2022 0000 by Delphine Thomas LPN  VTE Prevention/Management:   bilateral   sequential compression devices on  Taken 3/9/2022 2000 by Delphine Thomas LPN  VTE Prevention/Management:   bilateral   sequential compression devices on  Intervention: Prevent Infection  Recent Flowsheet Documentation  Taken 3/10/2022 0200 by Delphine Thomas LPN  Infection Prevention:   environmental surveillance performed   equipment surfaces disinfected   hand hygiene promoted   personal protective equipment utilized   rest/sleep promoted   single patient room provided  Taken 3/10/2022 0000 by Delphine Thomas LPN  Infection Prevention:   environmental surveillance performed   equipment surfaces disinfected   rest/sleep promoted   hand hygiene promoted   personal protective equipment utilized   single patient room provided  Taken 3/9/2022 2200 by Delphine Thomas LPN  Infection Prevention:   environmental surveillance performed   equipment surfaces disinfected   hand hygiene promoted   personal protective equipment utilized   rest/sleep promoted   single patient room provided  Taken 3/9/2022 2000 by Delphine Thomas LPN  Infection Prevention:   environmental surveillance performed   equipment surfaces disinfected   hand hygiene promoted   personal protective equipment utilized   rest/sleep promoted   single patient room provided  Goal: Optimal Comfort and Wellbeing  Intervention: Provide Person-Centered Care  Recent Flowsheet Documentation  Taken 3/9/2022 2000 by Delphine Thomas LPN  Trust Relationship/Rapport:   care explained   choices provided     Problem: Heart Failure Comorbidity  Goal: Maintenance of Heart Failure Symptom Control  Intervention: Maintain Heart Failure-Management Strategies  Recent Flowsheet Documentation  Taken 3/10/2022 0200 by Delphine Thomas LPN  Medication Review/Management: medications reviewed  Taken 3/10/2022 0000 by Delphine Thomas LPN  Medication  Review/Management: medications reviewed  Taken 3/9/2022 2200 by Delphine Thomas LPN  Medication Review/Management: medications reviewed  Taken 3/9/2022 2000 by Delphine Thomas LPN  Medication Review/Management: medications reviewed     Problem: Hypertension Comorbidity  Goal: Blood Pressure in Desired Range  Intervention: Maintain Hypertension-Management Strategies  Recent Flowsheet Documentation  Taken 3/10/2022 0200 by Delphine Thomas LPN  Medication Review/Management: medications reviewed  Taken 3/10/2022 0000 by Delphine Thomas LPN  Medication Review/Management: medications reviewed  Taken 3/9/2022 2200 by Delphine Thomas LPN  Medication Review/Management: medications reviewed  Taken 3/9/2022 2000 by Delphine Thomas LPN  Medication Review/Management: medications reviewed     Problem: Skin Injury Risk Increased  Goal: Skin Health and Integrity  Intervention: Optimize Skin Protection  Recent Flowsheet Documentation  Taken 3/9/2022 2000 by Delphine Thomas LPN  Pressure Reduction Techniques:   frequent weight shift encouraged   weight shift assistance provided  Pressure Reduction Devices: pressure-redistributing mattress utilized  Skin Protection: adhesive use limited     Problem: Skin Injury Risk Increased  Goal: Skin Health and Integrity  Outcome: Ongoing, Progressing  Intervention: Optimize Skin Protection  Recent Flowsheet Documentation  Taken 3/9/2022 2000 by Delphine Thomas LPN  Pressure Reduction Techniques:   frequent weight shift encouraged   weight shift assistance provided  Pressure Reduction Devices: pressure-redistributing mattress utilized  Skin Protection: adhesive use limited     Problem: Fall Injury Risk  Goal: Absence of Fall and Fall-Related Injury  Intervention: Identify and Manage Contributors to Fall Injury Risk  Recent Flowsheet Documentation  Taken 3/10/2022 0200 by Delphine Thomas LPN  Medication Review/Management: medications reviewed  Taken 3/10/2022 0000 by Delphine Thomas  LPN  Medication Review/Management: medications reviewed  Taken 3/9/2022 2200 by Delphine Thomas LPN  Medication Review/Management: medications reviewed  Taken 3/9/2022 2000 by Delphine Thomas LPN  Medication Review/Management: medications reviewed  Intervention: Promote Injury-Free Environment  Recent Flowsheet Documentation  Taken 3/10/2022 0200 by Delphine Thomas LPN  Safety Promotion/Fall Prevention:   safety round/check completed   room organization consistent   nonskid shoes/slippers when out of bed   lighting adjusted   fall prevention program maintained   clutter free environment maintained   assistive device/personal items within reach   activity supervised  Taken 3/10/2022 0000 by Delphine Thomas LPN  Safety Promotion/Fall Prevention:   safety round/check completed   room organization consistent   nonskid shoes/slippers when out of bed   lighting adjusted   fall prevention program maintained   clutter free environment maintained   assistive device/personal items within reach   activity supervised  Taken 3/9/2022 2200 by Delphine Thomas LPN  Safety Promotion/Fall Prevention:   safety round/check completed   room organization consistent   nonskid shoes/slippers when out of bed   lighting adjusted   fall prevention program maintained   clutter free environment maintained   assistive device/personal items within reach   activity supervised  Taken 3/9/2022 2000 by Delphine Thomas LPN  Safety Promotion/Fall Prevention:   activity supervised   assistive device/personal items within reach   clutter free environment maintained   fall prevention program maintained   lighting adjusted   nonskid shoes/slippers when out of bed   room organization consistent   safety round/check completed     Problem: Fall Injury Risk  Goal: Absence of Fall and Fall-Related Injury  Outcome: Ongoing, Progressing  Intervention: Identify and Manage Contributors  Recent Flowsheet Documentation  Taken 3/10/2022 0200 by Delphine Thomas  LPN  Medication Review/Management: medications reviewed  Taken 3/10/2022 0000 by Delphine Thomas LPN  Medication Review/Management: medications reviewed  Taken 3/9/2022 2200 by Delphine Thomas LPN  Medication Review/Management: medications reviewed  Taken 3/9/2022 2000 by Delphine Thomas LPN  Medication Review/Management: medications reviewed  Intervention: Promote Injury-Free Environment  Recent Flowsheet Documentation  Taken 3/10/2022 0200 by Delphine Thomas LPN  Safety Promotion/Fall Prevention:   safety round/check completed   room organization consistent   nonskid shoes/slippers when out of bed   lighting adjusted   fall prevention program maintained   clutter free environment maintained   assistive device/personal items within reach   activity supervised  Taken 3/10/2022 0000 by Delphine Thomas LPN  Safety Promotion/Fall Prevention:   safety round/check completed   room organization consistent   nonskid shoes/slippers when out of bed   lighting adjusted   fall prevention program maintained   clutter free environment maintained   assistive device/personal items within reach   activity supervised  Taken 3/9/2022 2200 by Delphine Thomas LPN  Safety Promotion/Fall Prevention:   safety round/check completed   room organization consistent   nonskid shoes/slippers when out of bed   lighting adjusted   fall prevention program maintained   clutter free environment maintained   assistive device/personal items within reach   activity supervised  Taken 3/9/2022 2000 by Delphine Thomas LPN  Safety Promotion/Fall Prevention:   activity supervised   assistive device/personal items within reach   clutter free environment maintained   fall prevention program maintained   lighting adjusted   nonskid shoes/slippers when out of bed   room organization consistent   safety round/check completed  Goal: Absence of Fall and Fall-Related Injury  Outcome: Ongoing, Progressing  Intervention: Identify and Manage  Contributors  Recent Flowsheet Documentation  Taken 3/10/2022 0200 by Delphine Thomas LPN  Medication Review/Management: medications reviewed  Taken 3/10/2022 0000 by Delphine Thomas LPN  Medication Review/Management: medications reviewed  Taken 3/9/2022 2200 by Delphine Thomas LPN  Medication Review/Management: medications reviewed  Taken 3/9/2022 2000 by Delphine Thomas LPN  Medication Review/Management: medications reviewed  Intervention: Promote Injury-Free Environment  Recent Flowsheet Documentation  Taken 3/10/2022 0200 by Delphine Thomas LPN  Safety Promotion/Fall Prevention:   safety round/check completed   room organization consistent   nonskid shoes/slippers when out of bed   lighting adjusted   fall prevention program maintained   clutter free environment maintained   assistive device/personal items within reach   activity supervised  Taken 3/10/2022 0000 by Delphine Thomas LPN  Safety Promotion/Fall Prevention:   safety round/check completed   room organization consistent   nonskid shoes/slippers when out of bed   lighting adjusted   fall prevention program maintained   clutter free environment maintained   assistive device/personal items within reach   activity supervised  Taken 3/9/2022 2200 by Delphine Thomas LPN  Safety Promotion/Fall Prevention:   safety round/check completed   room organization consistent   nonskid shoes/slippers when out of bed   lighting adjusted   fall prevention program maintained   clutter free environment maintained   assistive device/personal items within reach   activity supervised  Taken 3/9/2022 2000 by Delphine Thomas LPN  Safety Promotion/Fall Prevention:   activity supervised   assistive device/personal items within reach   clutter free environment maintained   fall prevention program maintained   lighting adjusted   nonskid shoes/slippers when out of bed   room organization consistent   safety round/check completed  Goal: Absence of Fall and Fall-Related  Injury  Outcome: Ongoing, Progressing  Intervention: Identify and Manage Contributors  Recent Flowsheet Documentation  Taken 3/10/2022 0200 by Delphine Thomas LPN  Medication Review/Management: medications reviewed  Taken 3/10/2022 0000 by Delphine Thomas LPN  Medication Review/Management: medications reviewed  Taken 3/9/2022 2200 by Delphine Thomas LPN  Medication Review/Management: medications reviewed  Taken 3/9/2022 2000 by Delphine Thomas LPN  Medication Review/Management: medications reviewed  Intervention: Promote Injury-Free Environment  Recent Flowsheet Documentation  Taken 3/10/2022 0200 by Delphine Thomas LPN  Safety Promotion/Fall Prevention:   safety round/check completed   room organization consistent   nonskid shoes/slippers when out of bed   lighting adjusted   fall prevention program maintained   clutter free environment maintained   assistive device/personal items within reach   activity supervised  Taken 3/10/2022 0000 by Delphine Thomas LPN  Safety Promotion/Fall Prevention:   safety round/check completed   room organization consistent   nonskid shoes/slippers when out of bed   lighting adjusted   fall prevention program maintained   clutter free environment maintained   assistive device/personal items within reach   activity supervised  Taken 3/9/2022 2200 by Delphine Thomas LPN  Safety Promotion/Fall Prevention:   safety round/check completed   room organization consistent   nonskid shoes/slippers when out of bed   lighting adjusted   fall prevention program maintained   clutter free environment maintained   assistive device/personal items within reach   activity supervised  Taken 3/9/2022 2000 by Delphine Thomas LPN  Safety Promotion/Fall Prevention:   activity supervised   assistive device/personal items within reach   clutter free environment maintained   fall prevention program maintained   lighting adjusted   nonskid shoes/slippers when out of bed   room organization consistent    safety round/check completed  Goal: Absence of Fall and Fall-Related Injury  Outcome: Ongoing, Progressing  Intervention: Identify and Manage Contributors  Recent Flowsheet Documentation  Taken 3/10/2022 0200 by Delphine Thomas LPN  Medication Review/Management: medications reviewed  Taken 3/10/2022 0000 by Delphine Thomas LPN  Medication Review/Management: medications reviewed  Taken 3/9/2022 2200 by Delphine Thomas LPN  Medication Review/Management: medications reviewed  Taken 3/9/2022 2000 by Delphine Thomas LPN  Medication Review/Management: medications reviewed  Intervention: Promote Injury-Free Environment  Recent Flowsheet Documentation  Taken 3/10/2022 0200 by Delphine Thomas LPN  Safety Promotion/Fall Prevention:   safety round/check completed   room organization consistent   nonskid shoes/slippers when out of bed   lighting adjusted   fall prevention program maintained   clutter free environment maintained   assistive device/personal items within reach   activity supervised  Taken 3/10/2022 0000 by Delphine Thomas LPN  Safety Promotion/Fall Prevention:   safety round/check completed   room organization consistent   nonskid shoes/slippers when out of bed   lighting adjusted   fall prevention program maintained   clutter free environment maintained   assistive device/personal items within reach   activity supervised  Taken 3/9/2022 2200 by Delphien Thomas LPN  Safety Promotion/Fall Prevention:   safety round/check completed   room organization consistent   nonskid shoes/slippers when out of bed   lighting adjusted   fall prevention program maintained   clutter free environment maintained   assistive device/personal items within reach   activity supervised  Taken 3/9/2022 2000 by Delphine Thomas LPN  Safety Promotion/Fall Prevention:   activity supervised   assistive device/personal items within reach   clutter free environment maintained   fall prevention program maintained   lighting adjusted    nonskid shoes/slippers when out of bed   room organization consistent   safety round/check completed  Goal: Absence of Fall and Fall-Related Injury  Outcome: Ongoing, Progressing  Intervention: Identify and Manage Contributors  Recent Flowsheet Documentation  Taken 3/10/2022 0200 by Delphine Thomas LPN  Medication Review/Management: medications reviewed  Taken 3/10/2022 0000 by Delphine Thomas LPN  Medication Review/Management: medications reviewed  Taken 3/9/2022 2200 by Delphine Thomas LPN  Medication Review/Management: medications reviewed  Taken 3/9/2022 2000 by Delphine Thomas LPN  Medication Review/Management: medications reviewed  Intervention: Promote Injury-Free Environment  Recent Flowsheet Documentation  Taken 3/10/2022 0200 by Delphine Thomas LPN  Safety Promotion/Fall Prevention:   safety round/check completed   room organization consistent   nonskid shoes/slippers when out of bed   lighting adjusted   fall prevention program maintained   clutter free environment maintained   assistive device/personal items within reach   activity supervised  Taken 3/10/2022 0000 by Delphine Thomas LPN  Safety Promotion/Fall Prevention:   safety round/check completed   room organization consistent   nonskid shoes/slippers when out of bed   lighting adjusted   fall prevention program maintained   clutter free environment maintained   assistive device/personal items within reach   activity supervised  Taken 3/9/2022 2200 by Delphine Thomas LPN  Safety Promotion/Fall Prevention:   safety round/check completed   room organization consistent   nonskid shoes/slippers when out of bed   lighting adjusted   fall prevention program maintained   clutter free environment maintained   assistive device/personal items within reach   activity supervised  Taken 3/9/2022 2000 by Delphine Thomas LPN  Safety Promotion/Fall Prevention:   activity supervised   assistive device/personal items within reach   clutter free environment  maintained   fall prevention program maintained   lighting adjusted   nonskid shoes/slippers when out of bed   room organization consistent   safety round/check completed   Goal Outcome Evaluation:  Plan of Care Reviewed With: patient        Progress: improving

## 2022-03-10 NOTE — PLAN OF CARE
Problem: Adult Inpatient Plan of Care  Goal: Plan of Care Review  Outcome: Ongoing, Progressing  Goal: Patient-Specific Goal (Individualized)  Outcome: Ongoing, Progressing  Goal: Absence of Hospital-Acquired Illness or Injury  Outcome: Ongoing, Progressing  Goal: Optimal Comfort and Wellbeing  Outcome: Ongoing, Progressing  Goal: Readiness for Transition of Care  Outcome: Ongoing, Progressing     Problem: Pain Acute  Goal: Acceptable Pain Control and Functional Ability  Outcome: Ongoing, Progressing  Goal: Acceptable Pain Control and Functional Ability  Outcome: Ongoing, Progressing     Problem: Asthma Comorbidity  Goal: Maintenance of Asthma Control  Outcome: Ongoing, Progressing     Problem: Hypertension Comorbidity  Goal: Blood Pressure in Desired Range  Outcome: Ongoing, Progressing     Problem: Skin Injury Risk Increased  Goal: Skin Health and Integrity  Outcome: Ongoing, Progressing     Problem: Fall Injury Risk  Goal: Absence of Fall and Fall-Related Injury  Outcome: Ongoing, Progressing  Goal: Absence of Fall and Fall-Related Injury  Outcome: Ongoing, Progressing  Goal: Absence of Fall and Fall-Related Injury  Outcome: Ongoing, Progressing  Goal: Absence of Fall and Fall-Related Injury  Outcome: Ongoing, Progressing  Goal: Absence of Fall and Fall-Related Injury  Outcome: Ongoing, Progressing  Goal: Absence of Fall and Fall-Related Injury  Outcome: Ongoing, Progressing     Problem: Dysrhythmia  Goal: Normalized Cardiac Rhythm  Outcome: Ongoing, Progressing   Goal Outcome Evaluation:      Patient did not have any new tests or procedures. The patient is off of oxygen and on room air. The patient did not have any complaints. The patient is being discharged to rehab. Will continue to monitor.

## 2022-03-10 NOTE — CASE MANAGEMENT/SOCIAL WORK
Continued Stay Note   Gabriel     Patient Name: Catalina Moreno  MRN: [id number]  Today's Date: 3/10/2022    Admit Date: 3/4/2022     Discharge Plan     Row Name 03/10/22 1134       Plan    Plan Comments Per Tawanna for [facility] no transport available today for patient. Bedside Nurse to contact son to see if he will transport. Patient is agreeable to discharge to [facility].               Met with patient at bedside wearing mask and goggles, Spent less than 15 minutes in room at greater than 6 feet distance.         Irena Grier RN

## 2022-03-10 NOTE — DISCHARGE SUMMARY
[facility] Medicine Services  DISCHARGE SUMMARY    Patient Name: Catalina Moreno  : 1940  MRN: [id number]      Discharge condition: Improved  Date of Admission: 3/4/2022  Discharge Diagnosis: Atrial fibrillation with rapid ventricular response  Date of Discharge:  2022  Primary Care Physician: Anayeli Costa APRN      Presenting Problem:   Acute on chronic diastolic congestive heart failure (HCC) [I50.33]  Atrial fibrillation with RVR (HCC) [I48.91]  Chronic left-sided low back pain without sciatica [M54.50, G89.29]  Chest pain, unspecified type [R07.9]    Active and Resolved Hospital Problems:  Active Hospital Problems    Diagnosis POA   • **Atrial fibrillation with RVR (HCC) [I48.91] Yes   • Pulmonary hypertension, unspecified (HCC) [I27.20] Yes   • Restless legs syndrome [G25.81] Yes   • Hypothyroidism, unspecified [E03.9] Yes   • Stage 2 chronic kidney disease [N18.2] Yes   • Acute on chronic diastolic congestive heart failure (HCC) [I50.33] Yes   • Lumbar radiculopathy [M54.16] Yes   • Primary fibromyalgia syndrome [M79.7] Yes      Resolved Hospital Problems   No resolved problems to display.         Hospital Course     Hospital Course:  Catalina Moreno is a 81 y.o. female who came to the hospital with shortness of breath palpitations and was found to have A. fib with rapid ventricular response.  Patient was placed on digoxin and metoprolol as well as amiodarone due to refractory A. fib.  She was on Eliquis prior to admission and this was restarted.  She had some affordability issues but these were worked out and she was able to get her Eliquis for $45 a month.  She was also found to have a chronic CHF of 45% ejection fraction which was stable.  The patient was eventually rate controlled and once back on Eliquis she was set up with rehab and once stable she was discharged after cardiology had signed off.  The patient was sent to rehab in stable condition with  stable vitals on March 10.          Reasons For Change In Medications and Indications for New Medications:      Day of Discharge     Vital Signs:  Temp:  [96.6 °F (35.9 °C)-98.5 °F (36.9 °C)] 98.5 °F (36.9 °C)  Heart Rate:  [] 84  Resp:  [11-15] 13  BP: ()/(37-72) 127/47    Physical Exam:  Physical Exam  Vitals reviewed.   HENT:      Head: Normocephalic.      Nose: Nose normal.      Mouth/Throat:      Mouth: Mucous membranes are moist.   Eyes:      Pupils: Pupils are equal, round, and reactive to light.   Cardiovascular:      Rate and Rhythm: Tachycardia present. Rhythm irregular.      Pulses: Normal pulses.   Pulmonary:      Effort: Pulmonary effort is normal.   Abdominal:      General: Abdomen is flat.   Musculoskeletal:         General: Normal range of motion.      Cervical back: Normal range of motion.   Skin:     General: Skin is warm.   Neurological:      General: No focal deficit present.      Mental Status: She is alert and oriented to person, place, and time.   Psychiatric:         Mood and Affect: Mood normal.         Behavior: Behavior normal.            Pertinent  and/or Most Recent Results     LAB RESULTS:      Lab 03/10/22  0346 03/09/22  0510 03/08/22  0440 03/07/22  0459 03/06/22  0229 03/04/22  2320   WBC 6.10 7.10 5.10 5.10 6.00 8.40   HEMOGLOBIN 10.7* 10.1* 10.2* 10.7* 10.8* 10.7*   HEMATOCRIT 34.1 31.2* 32.0* 33.8* 33.4* 33.9*   PLATELETS 229 213 193 217 208 250   NEUTROS ABS 2.20 3.10 2.55 2.40 2.70 5.90   LYMPHS ABS 2.80 2.90  --  1.90 2.40 1.70   MONOS ABS 0.60 0.90  --  0.60 0.60 0.70   EOS ABS 0.30 0.20 0.10 0.10 0.20 0.00   MCV 78.6* 78.3* 78.9* 79.3 77.2* 77.9*   CRP  --   --  2.10*  --   --   --          Lab 03/10/22  0346 03/09/22  0510 03/08/22  0440 03/07/22  0459 03/06/22  0229 03/04/22  2320 03/04/22  1309   SODIUM 140 140 140 139 136   < > 137   POTASSIUM 4.7 4.0 4.6 4.0 3.8   < > 4.1   CHLORIDE 101 103 102 100 97*   < > 99   CO2 28.0 28.0 29.0 27.0 24.0   < > 22.0    ANION GAP 11.0 9.0 9.0 12.0 15.0   < > 16.0*   BUN 15 14 20 16 20   < > 9   CREATININE 1.02* 0.86 1.01* 0.91 1.05*   < > 0.76   EGFR 55.7* 68.4 56.4* 63.9 53.8*   < > 79.3   GLUCOSE 98 93 112* 94 105*   < > 136*   CALCIUM 9.2 9.1 8.9 9.1 8.9   < > 9.6   MAGNESIUM 2.1 2.0 2.0 2.1 2.4   < > 1.8   PHOSPHORUS 4.1 3.0 3.4  --   --   --   --    TSH  --   --   --   --   --   --  3.530    < > = values in this interval not displayed.         Lab 03/04/22  1309   TOTAL PROTEIN 7.8   ALBUMIN 4.30   GLOBULIN 3.5   ALT (SGPT) 7   AST (SGOT) 18   BILIRUBIN 1.7*   ALK PHOS 114   LIPASE 9*         Lab 03/08/22  0440 03/04/22  2107 03/04/22  1309   PROBNP 1,120.0  --  8,657.0*   TROPONIN T  --  <0.010 <0.010             Lab 03/04/22  2320   IRON 33*   IRON SATURATION 6*   TIBC 542*   TRANSFERRIN 364*         Brief Urine Lab Results  (Last result in the past 365 days)      Color   Clarity   Blood   Leuk Est   Nitrite   Protein   CREAT   Urine HCG        03/04/22 1302 Yellow   Clear   Negative   Negative   Negative   30 mg/dL (1+)               Microbiology Results (last 10 days)     Procedure Component Value - Date/Time    COVID-19,CEPHEID/CARLI,COR/KEVEN/PAD/ISAÍAS IN-HOUSE(OR EMERGENT/ADD-ON),NP SWAB IN TRANSPORT MEDIA 3-4 HR TAT, RT-PCR - Swab, Nasopharynx [[id number]]  (Normal) Collected: 03/04/22 1310    Lab Status: Final result Specimen: Swab from Nasopharynx Updated: 03/04/22 1342     COVID19 Not Detected    Narrative:      Fact sheet for providers: https://www.fda.gov/media/075813/download     Fact sheet for patients: https://www.fda.gov/media/257444/download  Fact sheet for providers: https://www.fda.gov/media/133846/download    Fact sheet for patients: https://www.fda.gov/media/938401/download    Test performed by PCR.          CT Abdomen Pelvis Without Contrast    Result Date: 3/4/2022  Impression:  1. No acute findings within the abdomen or pelvis. 2. Trace pelvic free fluid, nonspecific. 3. FINDINGS suggestive of mild  interstitial edema lung bases. Trace left pleural effusion is present.    Electronically Signed By-Suzanne King MD On:3/4/2022 2:56 PM This report was finalized on [id number] by  Suzanne King MD.    XR Chest 1 View    Result Date: 3/6/2022  Impression: 1. Placement of a right upper extremity PICC line with tip at the right atrium. No pneumothorax. 2. Clear lungs.  Electronically Signed By-Edil Joshua MD On:3/6/2022 4:32 PM This report was finalized on [id number] by  Edil Joshua MD.    CT Chest Pulmonary Embolism    Result Date: 3/4/2022  Impression: 1. No pulmonary embolism. 2. Features suggestive of interstitial and alveolar pulmonary edema with small layering bilateral pleural effusions. 3. Mild cardiomegaly. Moderate left anterior descending coronary artery calcification.     Electronically Signed By-Suzanne King MD On:3/4/2022 4:13 PM This report was finalized on [id number] by  Suzanne King MD.    XR Abdomen KUB    Result Date: 3/9/2022  Impression: 1. Decreased gaseous distention of the stomach compared to the prior examination. 2. Nonspecific nondilated gas-filled loops of small bowel and colon, relatively similar to the prior exam.  Electronically Signed By-Leobardo Stein MD On:3/9/2022 2:56 PM This report was finalized on [id number] by  Leobardo Stein MD.    XR Abdomen KUB    Result Date: 3/8/2022  Impression:  1. Moderate gaseous distention of the stomach. Otherwise, no acute findings within the abdomen.  Electronically Signed By-Suzanne King MD On:3/8/2022 3:18 PM This report was finalized on [id number] by  Suzanne King MD.      Results for orders placed during the hospital encounter of 03/04/22    Duplex Venous Lower Extremity - Bilateral CAR    Interpretation Summary  · Normal bilateral lower extremity venous duplex scan.  · Nonvascular cystic mass located in the left posterior mid calf.      Results for orders placed during the hospital encounter of  03/04/22    Duplex Venous Lower Extremity - Bilateral CAR    Interpretation Summary  · Normal bilateral lower extremity venous duplex scan.  · Nonvascular cystic mass located in the left posterior mid calf.      Results for orders placed during the hospital encounter of 03/04/22    Adult Transthoracic Echo Complete W/ Cont if Necessary Per Protocol    Interpretation Summary  · Estimated right ventricular systolic pressure from tricuspid regurgitation is mildly elevated (35-45 mmHg).  · Mild pulmonary hypertension is present.  · Left atrial volume is severely increased.  · The right atrial cavity is severely dilated.  · Abnormal mitral valve structure consistent with dilated annulus.  · Estimated left ventricular EF = 45% Left ventricular ejection fraction appears to be 46 - 50%. Left ventricular systolic function is mildly decreased.  · Left ventricular diastolic function is consistent with (grade II w/high LAP) pseudonormalization.  · The right ventricular cavity is borderline dilated.      Labs Pending at Discharge:      Procedures Performed           Consults:   Consults     Date and Time Order Name Status Description    3/4/2022 10:28 PM Inpatient Cardiology Consult Completed     3/4/2022  4:23 PM Hospitalist (on-call MD unless specified)              Discharge Details        Discharge Medications      New Medications      Instructions Start Date   amiodarone 200 MG tablet  Commonly known as: PACERONE   200 mg, Oral, 2 Times Daily With Meals      apixaban 5 MG tablet tablet  Commonly known as: ELIQUIS   5 mg, Oral, Every 12 Hours Scheduled      metoprolol tartrate 25 MG tablet  Commonly known as: LOPRESSOR   25 mg, Oral, Every 12 Hours Scheduled         Continue These Medications      Instructions Start Date   atorvastatin 10 MG tablet  Commonly known as: LIPITOR   10 mg, Oral, Nightly      cyanocobalamin 1000 MCG tablet  Commonly known as: VITAMIN B-12   1,000 mcg, Oral, Daily      cyclobenzaprine 5 MG  tablet  Commonly known as: FLEXERIL   5 mg, Oral, 2 Times Daily PRN      digoxin 125 MCG tablet  Commonly known as: LANOXIN   125 mcg, Oral, Daily Digoxin      famotidine 20 MG tablet  Commonly known as: PEPCID   20 mg, Oral, Daily      gabapentin 600 MG tablet  Commonly known as: NEURONTIN   600 mg, Oral, 4 Times Daily      HYDROcodone-acetaminophen 7.5-325 MG per tablet  Commonly known as: NORCO   1 tablet, Oral, 4 Times Daily PRN      levothyroxine 88 MCG tablet  Commonly known as: SYNTHROID, LEVOTHROID   88 mcg, Oral, Daily      lidocaine 5 %  Commonly known as: LIDODERM   1 patch, Transdermal, Daily PRN, Remove & Discard patch within 12 hours or as directed by MD       pantoprazole 40 MG EC tablet  Commonly known as: PROTONIX   40 mg, Oral, Daily With Lunch, Afternoons      pregabalin 75 MG capsule  Commonly known as: LYRICA   TAKE 1 CAPSULE BY MOUTH TWICE DAILY      rOPINIRole 0.25 MG tablet  Commonly known as: REQUIP   0.25 mg, Oral, Nightly, Take 1 hour before bedtime      sertraline 50 MG tablet  Commonly known as: ZOLOFT   50 mg, Oral, Nightly      sildenafil 20 MG tablet  Commonly known as: REVATIO   20 mg, Oral, 2 Times Daily         Stop These Medications    metoprolol succinate  MG 24 hr tablet  Commonly known as: TOPROL-XL            Allergies   Allergen Reactions   • Baclofen Rash   • Codeine Nausea Only   • Ibuprofen Unknown (See Comments)     Patient doesn't know----Motin   • Methocarbamol Unknown (See Comments)     Patient doesn't know   • Naproxen Unknown (See Comments)     Pt. Doesn't know    • Tizanidine Hcl Hallucinations   • Tolmetin Dizziness     Pt. Doesn't know-- same as Tolectin         Discharge Disposition:   Rehab Facility or Unit (DC - External)    Diet:  Hospital:  Diet Order   Procedures   • Diet Cardiac; 2gm Na+         Discharge Activity:         CODE STATUS:  Code Status and Medical Interventions:   Ordered at: 03/04/22 2799     Code Status (Patient has no pulse and is not  breathing):    CPR (Attempt to Resuscitate)     Medical Interventions (Patient has pulse or is breathing):    Full Support         Future Appointments   Date Time Provider Department Center   3/29/2022  1:50 PM Shefali Worthy MD [facility]           Time spent on Discharge including face to face service:  55 minutes    This patient has been examined wearing appropriate Personal Protective Equipment and discussed with Patient and nursing. 03/10/22      Signature: Dom Murray MD

## 2022-03-11 NOTE — CASE MANAGEMENT/SOCIAL WORK
Case Management Discharge Note      Final Note: [facility]    Provided Post Acute Provider List?: Yes  Post Acute Provider List: Inpatient Rehab, Nursing Home  Provided Post Acute Provider Quality & Resource List?: Yes  Post Acute Provider Quality and Resource List: Nursing Home, Inpatient Rehab  Delivered To: Patient  Method of Delivery: In person    Selected Continued Care - Discharged on 3/10/2022 Admission date: 3/4/2022 - Discharge disposition: Rehab Facility or Unit (DC - External)    Destination Coordination complete.    Service Provider Selected Services Address Phone Fax Patient Preferred    [facility] [address], [place] [id number] [phone] --              Final Discharge Disposition Code: 03 - skilled nursing facility (SNF)

## 2022-03-21 ENCOUNTER — TELEPHONE (OUTPATIENT)
Dept: PAIN MEDICINE | Facility: CLINIC | Age: 82
End: 2022-03-21

## 2022-03-21 DIAGNOSIS — G89.4 CHRONIC PAIN SYNDROME: Primary | ICD-10-CM

## 2022-03-21 RX ORDER — HYDROCODONE BITARTRATE AND ACETAMINOPHEN 7.5; 325 MG/1; MG/1
1 TABLET ORAL 4 TIMES DAILY PRN
Qty: 120 TABLET | Refills: 0 | Status: SHIPPED | OUTPATIENT
Start: 2022-03-21 | End: 2022-05-24 | Stop reason: SDUPTHER

## 2022-05-24 ENCOUNTER — OFFICE VISIT (OUTPATIENT)
Dept: PAIN MEDICINE | Facility: CLINIC | Age: 82
End: 2022-05-24

## 2022-05-24 VITALS
HEIGHT: 65 IN | RESPIRATION RATE: 16 BRPM | WEIGHT: 156 LBS | OXYGEN SATURATION: 97 % | BODY MASS INDEX: 25.99 KG/M2 | SYSTOLIC BLOOD PRESSURE: 127 MMHG | DIASTOLIC BLOOD PRESSURE: 86 MMHG | HEART RATE: 150 BPM

## 2022-05-24 DIAGNOSIS — G25.81 RESTLESS LEG: ICD-10-CM

## 2022-05-24 DIAGNOSIS — M96.1 POSTLAMINECTOMY SYNDROME OF LUMBAR REGION: ICD-10-CM

## 2022-05-24 DIAGNOSIS — G89.4 CHRONIC PAIN SYNDROME: ICD-10-CM

## 2022-05-24 DIAGNOSIS — M54.16 LUMBAR RADICULITIS: ICD-10-CM

## 2022-05-24 DIAGNOSIS — Z79.899 HIGH RISK MEDICATION USE: Primary | ICD-10-CM

## 2022-05-24 DIAGNOSIS — M79.2 NEUROPATHIC PAIN: ICD-10-CM

## 2022-05-24 PROCEDURE — 99214 OFFICE O/P EST MOD 30 MIN: CPT | Performed by: ANESTHESIOLOGY

## 2022-05-24 RX ORDER — HYDROCODONE BITARTRATE AND ACETAMINOPHEN 7.5; 325 MG/1; MG/1
1 TABLET ORAL 4 TIMES DAILY PRN
Qty: 120 TABLET | Refills: 0 | Status: SHIPPED | OUTPATIENT
Start: 2022-05-24 | End: 2022-07-19 | Stop reason: SDUPTHER

## 2022-05-24 RX ORDER — METOPROLOL SUCCINATE 50 MG/1
TABLET, EXTENDED RELEASE ORAL
COMMUNITY
Start: 2022-04-06 | End: 2022-05-24 | Stop reason: ALTCHOICE

## 2022-05-24 RX ORDER — HYDROCODONE BITARTRATE AND ACETAMINOPHEN 7.5; 325 MG/1; MG/1
1 TABLET ORAL 4 TIMES DAILY PRN
Qty: 120 TABLET | Refills: 0 | Status: SHIPPED | OUTPATIENT
Start: 2022-06-23 | End: 2022-07-19 | Stop reason: SDUPTHER

## 2022-05-24 NOTE — PROGRESS NOTES
CC back pain, jonah leg pain  82-year-old female with HTN, depression, chronic polyarthralgia from osteoarthritis, chronic back pain S/p L4-5 laminectomy with bony fusion 2018/Noelle., here for follow-up.  Was hospitalized for A. fib with RVR.  Has follow-up with cardiology.  Continued chronic back pain bilateral lower extremity radicular pain and neurogenic claudication symptoms.  Denies saddle anesthesia, bladder or bowel incontinence.  Chronic polyarthralgia / left knee pain/ bilateral feet neuropathic pain    Still  limited in ADL due to bilateral lower extremity pain and weakness.  Ambulating with walker    Utilizes gabapentin, hydrocodone with good relief functional benefit without side effects.      L-Spine x-ray 2019 Degenerative changes of the lumbar spine with stable 6 mm anterolisthesis L4 upon L5  L-spine MRI 2019: postoperative changes are noted at L4 and L5 with laminectomies and posterior bony fusion. Degenerative changes are seen at multiple levels, greatest at L3-4, with moderate to severe spinal canal narrowing and moderate bilateral foraminal narrowing, greater on the left    C-spine CT 2017:  Degenerative changes spondylosis with central stenosis at C5-C6 and left foraminal stenosis C4/5    Pain Assessment   Location of Pain: Lower Back, R Hip, L Hip, R Leg, L Leg  Description of Pain: Dull/Aching, Throbbing, Stabbing  Previous Pain Rating : 8  Current Pain Ratin  Aggravating Factors: Activity  Alleviating Factors: Rest, Medication  Previous Treatments: Nerve Blocks/Injections, Epidural Steroids, Narcotic Pain Medication, Physical Therapy  Previous Diagnostic Studies: X-Ray, MRI    PEG Assessment   What number best describes your pain on average in the past week?10  What number best describes how, during the past week, pain has interfered with your enjoyment of life?8  What number best describes how, during the past week, pain has interfered with your general activity? 8     has a past  "medical history of Anxiety, Arthritis, Atrial fibrillation (HCC), Broken shoulder, Buttock pain, DDD (degenerative disc disease), lumbar, Depression, Edema, GERD (gastroesophageal reflux disease), H/O fall, Hip pain, Hypertension, Hypothyroidism, Insomnia, Irregular heart beat, Leg pain, Low back pain, Neuropathy, Pain in both feet, PONV (postoperative nausea and vomiting), Pulmonary hypertension (HCC), Radiculopathy, Restless legs, Spondylolisthesis, and Urinary incontinence.     has a past surgical history that includes Hysterectomy; Rotator cuff repair (Left); Cholecystectomy; Colonoscopy; Back surgery (07/19/2018); and Cardiac catheterization (N/A, 4/2/2021).  .  Social History     Tobacco Use   • Smoking status: Never Smoker   • Smokeless tobacco: Never Used   Substance Use Topics   • Alcohol use: No       Review of Systems        See HPI      Vitals:    05/24/22 1333   BP: 127/86   Pulse: (!) 150   Resp: 16   SpO2: 97%   Weight: 70.8 kg (156 lb)   Height: 165.1 cm (65\")     Physical Exam  Vitals reviewed.   Constitutional:       General: She is not in acute distress.     Comments: walker   Pulmonary:      Effort: Pulmonary effort is normal.   Musculoskeletal:      Lumbar back: Tenderness present. Decreased range of motion. Positive right straight leg raise test and positive left straight leg raise test.     PHQ9 on chart                    opioid risk tool high risk        Assessment/Plan  Diagnoses and all orders for this visit:    1. High risk medication use (Primary)  -     Urine Drug Screen - Urine, Clean Catch; Future    2. Chronic pain syndrome  -     HYDROcodone-acetaminophen (NORCO) 7.5-325 MG per tablet; Take 1 tablet by mouth 4 (Four) Times a Day As Needed for Severe Pain .  Dispense: 120 tablet; Refill: 0  -     HYDROcodone-acetaminophen (NORCO) 7.5-325 MG per tablet; Take 1 tablet by mouth 4 (Four) Times a Day As Needed for Severe Pain . DNF before 6/23/2022  Dispense: 120 tablet; Refill: 0    3. " Neuropathic pain    4. Postlaminectomy syndrome of lumbar region    5. Restless leg    6. Lumbar radiculitis    Summary:   81-year-old female with HTN, depression, chronic polyarthralgia from osteoarthritis, chronic back pain from DDD/ spondylosis/stenosis, S/p L4/5 laminectomy with bony fusion 7/2018/ Majd seen in  follow-up.     Chronic lower lumbar /coccyx pain and RLE radicular pain, continued bilateral lower extremity weakness.  Consider repeat caudal/ LESI as needed    Was hospitalized for A. fib with RVR.  Has follow-up with cardiology.  Good relief with hydrocodone and gabapentin, continued restless leg at night.    Continue hydrocodone 7.5/325 4 times daily as needed for severe pain.  UDS and Inspect reviewed.   Discussed risk of tolerance, dependence, respiratory depression, coma and death associated with use of oral opioids for treatment of chronic nonmalignant pain.      Cont Lidocaine patch for worsening myofascial and neuropathic pain.   Cont requip for RLS at night    RTC  in  2 mo.

## 2022-06-21 ENCOUNTER — TELEPHONE (OUTPATIENT)
Dept: PAIN MEDICINE | Facility: CLINIC | Age: 82
End: 2022-06-21

## 2022-06-21 NOTE — TELEPHONE ENCOUNTER
6/21/22- Dr DE SOUZA -- pt left vm-- having  Muscle spasms in both legs with jerking, keeping her up at night, not sleeping--  Please review and advise [facility] meds

## 2022-07-19 ENCOUNTER — OFFICE VISIT (OUTPATIENT)
Dept: PAIN MEDICINE | Facility: CLINIC | Age: 82
End: 2022-07-19

## 2022-07-19 VITALS
SYSTOLIC BLOOD PRESSURE: 163 MMHG | HEART RATE: 159 BPM | OXYGEN SATURATION: 97 % | DIASTOLIC BLOOD PRESSURE: 94 MMHG | HEIGHT: 65 IN | RESPIRATION RATE: 16 BRPM | BODY MASS INDEX: 25.99 KG/M2 | WEIGHT: 156 LBS

## 2022-07-19 DIAGNOSIS — M96.1 POSTLAMINECTOMY SYNDROME OF LUMBAR REGION: ICD-10-CM

## 2022-07-19 DIAGNOSIS — G25.81 RESTLESS LEG: ICD-10-CM

## 2022-07-19 DIAGNOSIS — G89.4 CHRONIC PAIN SYNDROME: Primary | ICD-10-CM

## 2022-07-19 DIAGNOSIS — M79.2 NEUROPATHIC PAIN: ICD-10-CM

## 2022-07-19 DIAGNOSIS — Z79.899 HIGH RISK MEDICATION USE: ICD-10-CM

## 2022-07-19 PROCEDURE — 99214 OFFICE O/P EST MOD 30 MIN: CPT | Performed by: ANESTHESIOLOGY

## 2022-07-19 RX ORDER — HYDROCODONE BITARTRATE AND ACETAMINOPHEN 7.5; 325 MG/1; MG/1
1 TABLET ORAL 4 TIMES DAILY PRN
Qty: 120 TABLET | Refills: 0 | Status: SHIPPED | OUTPATIENT
Start: 2022-08-20 | End: 2022-07-25 | Stop reason: HOSPADM

## 2022-07-19 RX ORDER — HYDROCODONE BITARTRATE AND ACETAMINOPHEN 7.5; 325 MG/1; MG/1
1 TABLET ORAL 4 TIMES DAILY PRN
Qty: 120 TABLET | Refills: 0 | Status: SHIPPED | OUTPATIENT
Start: 2022-07-21 | End: 2022-10-04 | Stop reason: SDUPTHER

## 2022-07-19 RX ORDER — TIZANIDINE 4 MG/1
4 TABLET ORAL 2 TIMES DAILY PRN
Qty: 60 TABLET | Refills: 5 | Status: ON HOLD | OUTPATIENT
Start: 2022-07-19 | End: 2022-07-25 | Stop reason: SINTOL

## 2022-07-19 NOTE — PROGRESS NOTES
CC back pain, jonah leg pain  82-year-old female with HTN, depression, chronic polyarthralgia from osteoarthritis, chronic back pain S/p L4-5 laminectomy with bony fusion 2018/[facility]aren, here for follow-up.  Denies new complaint however continued tachycardia.  Heart rate in the 150s and irregular in the office today.  She never follow-up with cardiology since hospitalization.  Continued chronic back pain bilateral lower extremity radicular pain and neurogenic claudication symptoms.  Denies saddle anesthesia, bladder or bowel incontinence.  Chronic polyarthralgia / left knee pain/ bilateral feet neuropathic pain    Still  limited in ADL due to bilateral lower extremity pain and weakness.  Ambulating with walker    Utilizes gabapentin, hydrocodone with good relief functional benefit without side effects.      L-Spine x-ray 2019 Degenerative changes of the lumbar spine with stable 6 mm anterolisthesis L4 upon L5  L-spine MRI 2019: postoperative changes are noted at L4 and L5 with laminectomies and posterior bony fusion. Degenerative changes are seen at multiple levels, greatest at L3-4, with moderate to severe spinal canal narrowing and moderate bilateral foraminal narrowing, greater on the left    C-spine CT 2017:  Degenerative changes spondylosis with central stenosis at C5-C6 and left foraminal stenosis C4/5    Pain Assessment   Location of Pain: Lower Back, R Hip, L Hip, R Leg, L Leg  Description of Pain: Dull/Aching, Throbbing, Stabbing  Previous Pain Rating : 8  Current Pain Ratin  Aggravating Factors: Activity  Alleviating Factors: Rest, Medication  Previous Treatments: Nerve Blocks/Injections, Epidural Steroids, Narcotic Pain Medication, Physical Therapy  Previous Diagnostic Studies: X-Ray, MRI    PEG Assessment   What number best describes your pain on average in the past week?10  What number best describes how, during the past week, pain has interfered with your enjoyment of life?8  What number best  "describes how, during the past week, pain has interfered with your general activity? 10     has a past medical history of Anxiety, Arthritis, Atrial fibrillation (HCC), Broken shoulder, Buttock pain, DDD (degenerative disc disease), lumbar, Depression, Edema, GERD (gastroesophageal reflux disease), H/O fall, Hip pain, Hypertension, Hypothyroidism, Insomnia, Irregular heart beat, Leg pain, Low back pain, Neuropathy, Pain in both feet, PONV (postoperative nausea and vomiting), Pulmonary hypertension (HCC), Radiculopathy, Restless legs, Spondylolisthesis, and Urinary incontinence.     has a past surgical history that includes Hysterectomy; Rotator cuff repair (Left); Cholecystectomy; Colonoscopy; Back surgery (07/19/2018); and Cardiac catheterization (N/A, 4/2/2021).  .  Social History     Tobacco Use   • Smoking status: Never Smoker   • Smokeless tobacco: Never Used   Substance Use Topics   • Alcohol use: No       Review of Systems        See HPI      Vitals:    07/19/22 1348 07/19/22 1411   BP:  163/94   Pulse: (!) 156 (!) 159   Resp: 16    SpO2: 96% 97%   Weight: 70.8 kg (156 lb)    Height: 165.1 cm (65\")      Physical Exam  Vitals reviewed.   Constitutional:       General: She is not in acute distress.     Comments: walker   Pulmonary:      Effort: Pulmonary effort is normal.   Musculoskeletal:      Lumbar back: Tenderness present. Decreased range of motion. Positive right straight leg raise test and positive left straight leg raise test.     PHQ9 on chart                    opioid risk tool high risk        Assessment/Plan  Diagnoses and all orders for this visit:    1. Chronic pain syndrome (Primary)  -     HYDROcodone-acetaminophen (NORCO) 7.5-325 MG per tablet; Take 1 tablet by mouth 4 (Four) Times a Day As Needed for Severe Pain .  Dispense: 120 tablet; Refill: 0  -     HYDROcodone-acetaminophen (NORCO) 7.5-325 MG per tablet; Take 1 tablet by mouth 4 (Four) Times a Day As Needed for Severe Pain . DNF before " 8/20/2022  Dispense: 120 tablet; Refill: 0    2. Neuropathic pain    3. Postlaminectomy syndrome of lumbar region    4. Restless leg    5. High risk medication use    Summary:   81-year-old female with HTN, depression, chronic polyarthralgia from osteoarthritis, chronic back pain from DDD/ spondylosis/stenosis, S/p L4/5 laminectomy with bony fusion 7/2018/ Majd seen in  follow-up.     Chronic lower lumbar /coccyx pain and RLE radicular pain, continued bilateral lower extremity weakness.  Consider repeat caudal/ LESI as needed    Denies new complaint however continued tachycardia.  Heart rate in the 150s and irregular in the office today.  She never follow-up with cardiology since hospitalization.  Denies chest pain, dizziness but reports fatigue for the last few days.  Recommended she goes to ED for evaluation.  Likely not taking amiodarone or digoxin at this time.  States she would rather follow-up with her cardiologist.  Declines ER recommendation.  Encouraged to call cardiology ASAP.  Voiced understanding.    Continue hydrocodone 7.5/325 4 times daily as needed for severe pain.  UDS and Inspect reviewed.   Discussed risk of tolerance, dependence, respiratory depression, coma and death associated with use of oral opioids for treatment of chronic nonmalignant pain.      Cont Lidocaine patch for worsening myofascial and neuropathic pain.   Cont requip for RLS at night    RTC  in  2 mo.

## 2022-07-21 ENCOUNTER — APPOINTMENT (OUTPATIENT)
Dept: CT IMAGING | Facility: HOSPITAL | Age: 82
End: 2022-07-21

## 2022-07-21 ENCOUNTER — HOSPITAL ENCOUNTER (INPATIENT)
Facility: HOSPITAL | Age: 82
LOS: 1 days | Discharge: HOME HEALTH | End: 2022-07-25
Attending: EMERGENCY MEDICINE | Admitting: HOSPITALIST

## 2022-07-21 DIAGNOSIS — R53.1 WEAKNESS: Primary | ICD-10-CM

## 2022-07-21 DIAGNOSIS — I48.91 ATRIAL FIBRILLATION WITH RVR: ICD-10-CM

## 2022-07-21 DIAGNOSIS — R42 DIZZINESS: ICD-10-CM

## 2022-07-21 LAB
ALBUMIN SERPL-MCNC: 4.3 G/DL (ref 3.5–5.2)
ALBUMIN/GLOB SERPL: 1.4 G/DL
ALP SERPL-CCNC: 87 U/L (ref 39–117)
ALT SERPL W P-5'-P-CCNC: 5 U/L (ref 1–33)
ANION GAP SERPL CALCULATED.3IONS-SCNC: 14 MMOL/L (ref 5–15)
AST SERPL-CCNC: 13 U/L (ref 1–32)
BACTERIA UR QL AUTO: ABNORMAL /HPF
BASOPHILS # BLD AUTO: 0.1 10*3/MM3 (ref 0–0.2)
BASOPHILS NFR BLD AUTO: 1.5 % (ref 0–1.5)
BILIRUB SERPL-MCNC: 0.8 MG/DL (ref 0–1.2)
BILIRUB UR QL STRIP: NEGATIVE
BUN SERPL-MCNC: 28 MG/DL (ref 8–23)
BUN/CREAT SERPL: 19.4 (ref 7–25)
CALCIUM SPEC-SCNC: 8.9 MG/DL (ref 8.6–10.5)
CHLORIDE SERPL-SCNC: 101 MMOL/L (ref 98–107)
CLARITY UR: ABNORMAL
CO2 SERPL-SCNC: 26 MMOL/L (ref 22–29)
COLOR UR: YELLOW
CREAT SERPL-MCNC: 1.44 MG/DL (ref 0.57–1)
DEPRECATED RDW RBC AUTO: 50.3 FL (ref 37–54)
DIGOXIN SERPL-MCNC: <0.3 NG/ML (ref 0.6–1.2)
EGFRCR SERPLBLD CKD-EPI 2021: 36.6 ML/MIN/1.73
EOSINOPHIL # BLD AUTO: 0.1 10*3/MM3 (ref 0–0.4)
EOSINOPHIL NFR BLD AUTO: 1.9 % (ref 0.3–6.2)
ERYTHROCYTE [DISTWIDTH] IN BLOOD BY AUTOMATED COUNT: 17.2 % (ref 12.3–15.4)
GLOBULIN UR ELPH-MCNC: 3.1 GM/DL
GLUCOSE SERPL-MCNC: 116 MG/DL (ref 65–99)
GLUCOSE UR STRIP-MCNC: NEGATIVE MG/DL
HCT VFR BLD AUTO: 34.3 % (ref 34–46.6)
HGB BLD-MCNC: 10.7 G/DL (ref 12–15.9)
HGB UR QL STRIP.AUTO: NEGATIVE
HYALINE CASTS UR QL AUTO: ABNORMAL /LPF
KETONES UR QL STRIP: ABNORMAL
LEUKOCYTE ESTERASE UR QL STRIP.AUTO: ABNORMAL
LYMPHOCYTES # BLD AUTO: 2.5 10*3/MM3 (ref 0.7–3.1)
LYMPHOCYTES NFR BLD AUTO: 35.6 % (ref 19.6–45.3)
MCH RBC QN AUTO: 26.5 PG (ref 26.6–33)
MCHC RBC AUTO-ENTMCNC: 31.2 G/DL (ref 31.5–35.7)
MCV RBC AUTO: 85 FL (ref 79–97)
MONOCYTES # BLD AUTO: 0.6 10*3/MM3 (ref 0.1–0.9)
MONOCYTES NFR BLD AUTO: 8.6 % (ref 5–12)
MUCOUS THREADS URNS QL MICRO: ABNORMAL /HPF
NEUTROPHILS NFR BLD AUTO: 3.8 10*3/MM3 (ref 1.7–7)
NEUTROPHILS NFR BLD AUTO: 52.4 % (ref 42.7–76)
NITRITE UR QL STRIP: NEGATIVE
NRBC BLD AUTO-RTO: 0 /100 WBC (ref 0–0.2)
PH UR STRIP.AUTO: 5.5 [PH] (ref 5–8)
PLATELET # BLD AUTO: 328 10*3/MM3 (ref 140–450)
PMV BLD AUTO: 7.6 FL (ref 6–12)
POTASSIUM SERPL-SCNC: 4.5 MMOL/L (ref 3.5–5.2)
PROT SERPL-MCNC: 7.4 G/DL (ref 6–8.5)
PROT UR QL STRIP: ABNORMAL
RBC # BLD AUTO: 4.04 10*6/MM3 (ref 3.77–5.28)
RBC # UR STRIP: ABNORMAL /HPF
REF LAB TEST METHOD: ABNORMAL
SARS-COV-2 RNA RESP QL NAA+PROBE: NOT DETECTED
SODIUM SERPL-SCNC: 141 MMOL/L (ref 136–145)
SP GR UR STRIP: 1.02 (ref 1–1.03)
SQUAMOUS #/AREA URNS HPF: ABNORMAL /HPF
TROPONIN T SERPL-MCNC: <0.01 NG/ML (ref 0–0.03)
TSH SERPL DL<=0.05 MIU/L-ACNC: 9.53 UIU/ML (ref 0.27–4.2)
UROBILINOGEN UR QL STRIP: ABNORMAL
WBC # UR STRIP: ABNORMAL /HPF
WBC NRBC COR # BLD: 7.2 10*3/MM3 (ref 3.4–10.8)

## 2022-07-21 PROCEDURE — U0003 INFECTIOUS AGENT DETECTION BY NUCLEIC ACID (DNA OR RNA); SEVERE ACUTE RESPIRATORY SYNDROME CORONAVIRUS 2 (SARS-COV-2) (CORONAVIRUS DISEASE [COVID-19]), AMPLIFIED PROBE TECHNIQUE, MAKING USE OF HIGH THROUGHPUT TECHNOLOGIES AS DESCRIBED BY CMS-2020-01-R: HCPCS | Performed by: NURSE PRACTITIONER

## 2022-07-21 PROCEDURE — 99285 EMERGENCY DEPT VISIT HI MDM: CPT

## 2022-07-21 PROCEDURE — 83880 ASSAY OF NATRIURETIC PEPTIDE: CPT | Performed by: NURSE PRACTITIONER

## 2022-07-21 PROCEDURE — 99219 PR INITIAL OBSERVATION CARE/DAY 50 MINUTES: CPT | Performed by: NURSE PRACTITIONER

## 2022-07-21 PROCEDURE — 70450 CT HEAD/BRAIN W/O DYE: CPT

## 2022-07-21 PROCEDURE — 80053 COMPREHEN METABOLIC PANEL: CPT | Performed by: NURSE PRACTITIONER

## 2022-07-21 PROCEDURE — 85025 COMPLETE CBC W/AUTO DIFF WBC: CPT | Performed by: NURSE PRACTITIONER

## 2022-07-21 PROCEDURE — 84443 ASSAY THYROID STIM HORMONE: CPT | Performed by: NURSE PRACTITIONER

## 2022-07-21 PROCEDURE — 80162 ASSAY OF DIGOXIN TOTAL: CPT | Performed by: NURSE PRACTITIONER

## 2022-07-21 PROCEDURE — U0005 INFEC AGEN DETEC AMPLI PROBE: HCPCS | Performed by: NURSE PRACTITIONER

## 2022-07-21 PROCEDURE — 93005 ELECTROCARDIOGRAM TRACING: CPT | Performed by: NURSE PRACTITIONER

## 2022-07-21 PROCEDURE — 84484 ASSAY OF TROPONIN QUANT: CPT | Performed by: NURSE PRACTITIONER

## 2022-07-21 PROCEDURE — 81001 URINALYSIS AUTO W/SCOPE: CPT | Performed by: NURSE PRACTITIONER

## 2022-07-21 RX ORDER — HYDROCODONE BITARTRATE AND ACETAMINOPHEN 7.5; 325 MG/1; MG/1
1 TABLET ORAL ONCE
Status: COMPLETED | OUTPATIENT
Start: 2022-07-21 | End: 2022-07-21

## 2022-07-21 RX ORDER — DILTIAZEM HCL/D5W 125 MG/125
5-15 PLASTIC BAG, INJECTION (ML) INTRAVENOUS
Status: DISCONTINUED | OUTPATIENT
Start: 2022-07-21 | End: 2022-07-22

## 2022-07-21 RX ORDER — SODIUM CHLORIDE 0.9 % (FLUSH) 0.9 %
10 SYRINGE (ML) INJECTION AS NEEDED
Status: DISCONTINUED | OUTPATIENT
Start: 2022-07-21 | End: 2022-07-25 | Stop reason: HOSPADM

## 2022-07-21 RX ADMIN — HYDROCODONE BITARTRATE AND ACETAMINOPHEN 1 TABLET: 7.5; 325 TABLET ORAL at 23:26

## 2022-07-21 RX ADMIN — SODIUM CHLORIDE 500 ML: 9 INJECTION, SOLUTION INTRAVENOUS at 20:37

## 2022-07-21 RX ADMIN — Medication 5 MG/HR: at 23:56

## 2022-07-22 PROBLEM — N17.9 ACUTE KIDNEY INJURY: Status: ACTIVE | Noted: 2022-07-22

## 2022-07-22 PROBLEM — I50.9 CONGESTIVE HEART FAILURE: Status: ACTIVE | Noted: 2021-08-02

## 2022-07-22 PROBLEM — F32.9 MAJOR DEPRESSIVE DISORDER, SINGLE EPISODE, UNSPECIFIED: Chronic | Status: ACTIVE | Noted: 2021-07-27

## 2022-07-22 PROBLEM — E55.9 VITAMIN D DEFICIENCY: Chronic | Status: ACTIVE | Noted: 2020-10-08

## 2022-07-22 LAB
ANION GAP SERPL CALCULATED.3IONS-SCNC: 11 MMOL/L (ref 5–15)
BASOPHILS # BLD AUTO: 0.1 10*3/MM3 (ref 0–0.2)
BASOPHILS NFR BLD AUTO: 1.3 % (ref 0–1.5)
BUN SERPL-MCNC: 29 MG/DL (ref 8–23)
BUN/CREAT SERPL: 23.2 (ref 7–25)
CALCIUM SPEC-SCNC: 8.7 MG/DL (ref 8.6–10.5)
CHLORIDE SERPL-SCNC: 103 MMOL/L (ref 98–107)
CO2 SERPL-SCNC: 26 MMOL/L (ref 22–29)
CREAT SERPL-MCNC: 1.25 MG/DL (ref 0.57–1)
DEPRECATED RDW RBC AUTO: 49.9 FL (ref 37–54)
EGFRCR SERPLBLD CKD-EPI 2021: 43.4 ML/MIN/1.73
EOSINOPHIL # BLD AUTO: 0.2 10*3/MM3 (ref 0–0.4)
EOSINOPHIL NFR BLD AUTO: 2.8 % (ref 0.3–6.2)
ERYTHROCYTE [DISTWIDTH] IN BLOOD BY AUTOMATED COUNT: 16.9 % (ref 12.3–15.4)
GLUCOSE SERPL-MCNC: 98 MG/DL (ref 65–99)
HCT VFR BLD AUTO: 32.5 % (ref 34–46.6)
HGB BLD-MCNC: 10.5 G/DL (ref 12–15.9)
LYMPHOCYTES # BLD AUTO: 3.7 10*3/MM3 (ref 0.7–3.1)
LYMPHOCYTES NFR BLD AUTO: 49.7 % (ref 19.6–45.3)
MCH RBC QN AUTO: 27.1 PG (ref 26.6–33)
MCHC RBC AUTO-ENTMCNC: 32.4 G/DL (ref 31.5–35.7)
MCV RBC AUTO: 83.7 FL (ref 79–97)
MONOCYTES # BLD AUTO: 0.8 10*3/MM3 (ref 0.1–0.9)
MONOCYTES NFR BLD AUTO: 10 % (ref 5–12)
NEUTROPHILS NFR BLD AUTO: 2.7 10*3/MM3 (ref 1.7–7)
NEUTROPHILS NFR BLD AUTO: 36.2 % (ref 42.7–76)
NRBC BLD AUTO-RTO: 0.1 /100 WBC (ref 0–0.2)
NT-PROBNP SERPL-MCNC: 6125 PG/ML (ref 0–1800)
PLATELET # BLD AUTO: 297 10*3/MM3 (ref 140–450)
PMV BLD AUTO: 7.7 FL (ref 6–12)
POTASSIUM SERPL-SCNC: 4.1 MMOL/L (ref 3.5–5.2)
RBC # BLD AUTO: 3.89 10*6/MM3 (ref 3.77–5.28)
SODIUM SERPL-SCNC: 140 MMOL/L (ref 136–145)
WBC NRBC COR # BLD: 7.5 10*3/MM3 (ref 3.4–10.8)

## 2022-07-22 PROCEDURE — 99214 OFFICE O/P EST MOD 30 MIN: CPT | Performed by: INTERNAL MEDICINE

## 2022-07-22 PROCEDURE — G0378 HOSPITAL OBSERVATION PER HR: HCPCS

## 2022-07-22 PROCEDURE — 85025 COMPLETE CBC W/AUTO DIFF WBC: CPT | Performed by: NURSE PRACTITIONER

## 2022-07-22 PROCEDURE — 80048 BASIC METABOLIC PNL TOTAL CA: CPT | Performed by: NURSE PRACTITIONER

## 2022-07-22 PROCEDURE — 25010000002 ENOXAPARIN PER 10 MG: Performed by: NURSE PRACTITIONER

## 2022-07-22 PROCEDURE — 36415 COLL VENOUS BLD VENIPUNCTURE: CPT | Performed by: NURSE PRACTITIONER

## 2022-07-22 PROCEDURE — 99226 PR SBSQ OBSERVATION CARE/DAY 35 MINUTES: CPT | Performed by: FAMILY MEDICINE

## 2022-07-22 RX ORDER — ONDANSETRON 2 MG/ML
4 INJECTION INTRAMUSCULAR; INTRAVENOUS EVERY 6 HOURS PRN
Status: DISCONTINUED | OUTPATIENT
Start: 2022-07-22 | End: 2022-07-25 | Stop reason: HOSPADM

## 2022-07-22 RX ORDER — DIGOXIN 125 MCG
125 TABLET ORAL
Status: DISCONTINUED | OUTPATIENT
Start: 2022-07-22 | End: 2022-07-25 | Stop reason: HOSPADM

## 2022-07-22 RX ORDER — ALUMINA, MAGNESIA, AND SIMETHICONE 2400; 2400; 240 MG/30ML; MG/30ML; MG/30ML
15 SUSPENSION ORAL EVERY 6 HOURS PRN
Status: DISCONTINUED | OUTPATIENT
Start: 2022-07-22 | End: 2022-07-25 | Stop reason: HOSPADM

## 2022-07-22 RX ORDER — HYDROCODONE BITARTRATE AND ACETAMINOPHEN 7.5; 325 MG/1; MG/1
1 TABLET ORAL 4 TIMES DAILY PRN
Status: DISCONTINUED | OUTPATIENT
Start: 2022-07-22 | End: 2022-07-25 | Stop reason: HOSPADM

## 2022-07-22 RX ORDER — PREGABALIN 75 MG/1
75 CAPSULE ORAL 2 TIMES DAILY
Status: DISCONTINUED | OUTPATIENT
Start: 2022-07-22 | End: 2022-07-25

## 2022-07-22 RX ORDER — AMIODARONE HYDROCHLORIDE 200 MG/1
200 TABLET ORAL 2 TIMES DAILY WITH MEALS
Status: DISCONTINUED | OUTPATIENT
Start: 2022-07-22 | End: 2022-07-25 | Stop reason: HOSPADM

## 2022-07-22 RX ORDER — ENOXAPARIN SODIUM 100 MG/ML
1 INJECTION SUBCUTANEOUS EVERY 12 HOURS
Status: DISCONTINUED | OUTPATIENT
Start: 2022-07-22 | End: 2022-07-22

## 2022-07-22 RX ORDER — CHOLECALCIFEROL (VITAMIN D3) 125 MCG
5 CAPSULE ORAL NIGHTLY PRN
Status: DISCONTINUED | OUTPATIENT
Start: 2022-07-22 | End: 2022-07-25 | Stop reason: HOSPADM

## 2022-07-22 RX ORDER — SILDENAFIL CITRATE 20 MG/1
20 TABLET ORAL 2 TIMES DAILY
Status: DISCONTINUED | OUTPATIENT
Start: 2022-07-22 | End: 2022-07-25 | Stop reason: HOSPADM

## 2022-07-22 RX ORDER — NITROGLYCERIN 0.4 MG/1
0.4 TABLET SUBLINGUAL
Status: DISCONTINUED | OUTPATIENT
Start: 2022-07-22 | End: 2022-07-22

## 2022-07-22 RX ORDER — GABAPENTIN 300 MG/1
600 CAPSULE ORAL ONCE
Status: COMPLETED | OUTPATIENT
Start: 2022-07-22 | End: 2022-07-22

## 2022-07-22 RX ORDER — LEVOTHYROXINE SODIUM 88 UG/1
88 TABLET ORAL DAILY
Status: DISCONTINUED | OUTPATIENT
Start: 2022-07-22 | End: 2022-07-25 | Stop reason: HOSPADM

## 2022-07-22 RX ORDER — ROPINIROLE 0.25 MG/1
0.25 TABLET, FILM COATED ORAL NIGHTLY
Status: DISCONTINUED | OUTPATIENT
Start: 2022-07-22 | End: 2022-07-25 | Stop reason: HOSPADM

## 2022-07-22 RX ORDER — ONDANSETRON 4 MG/1
4 TABLET, FILM COATED ORAL EVERY 6 HOURS PRN
Status: DISCONTINUED | OUTPATIENT
Start: 2022-07-22 | End: 2022-07-25 | Stop reason: HOSPADM

## 2022-07-22 RX ORDER — PANTOPRAZOLE SODIUM 40 MG/1
40 TABLET, DELAYED RELEASE ORAL
Status: DISCONTINUED | OUTPATIENT
Start: 2022-07-22 | End: 2022-07-25 | Stop reason: HOSPADM

## 2022-07-22 RX ORDER — ATORVASTATIN CALCIUM 10 MG/1
10 TABLET, FILM COATED ORAL NIGHTLY
Status: DISCONTINUED | OUTPATIENT
Start: 2022-07-22 | End: 2022-07-25 | Stop reason: HOSPADM

## 2022-07-22 RX ADMIN — HYDROCODONE BITARTRATE AND ACETAMINOPHEN 1 TABLET: 7.5; 325 TABLET ORAL at 21:04

## 2022-07-22 RX ADMIN — METOPROLOL TARTRATE 25 MG: 25 TABLET, FILM COATED ORAL at 21:04

## 2022-07-22 RX ADMIN — AMIODARONE HYDROCHLORIDE 200 MG: 200 TABLET ORAL at 11:43

## 2022-07-22 RX ADMIN — SERTRALINE 50 MG: 50 TABLET, FILM COATED ORAL at 21:04

## 2022-07-22 RX ADMIN — APIXABAN 5 MG: 5 TABLET, FILM COATED ORAL at 17:38

## 2022-07-22 RX ADMIN — PANTOPRAZOLE SODIUM 40 MG: 40 TABLET, DELAYED RELEASE ORAL at 11:43

## 2022-07-22 RX ADMIN — SILDENAFIL 20 MG: 20 TABLET, FILM COATED ORAL at 21:04

## 2022-07-22 RX ADMIN — AMIODARONE HYDROCHLORIDE 200 MG: 200 TABLET ORAL at 17:38

## 2022-07-22 RX ADMIN — DIGOXIN 125 MCG: 125 TABLET ORAL at 11:43

## 2022-07-22 RX ADMIN — PREGABALIN 75 MG: 75 CAPSULE ORAL at 21:04

## 2022-07-22 RX ADMIN — ENOXAPARIN SODIUM 70 MG: 100 INJECTION SUBCUTANEOUS at 04:36

## 2022-07-22 RX ADMIN — GABAPENTIN 600 MG: 300 CAPSULE ORAL at 04:36

## 2022-07-22 RX ADMIN — LEVOTHYROXINE SODIUM 88 MCG: 0.09 TABLET ORAL at 10:45

## 2022-07-22 RX ADMIN — PREGABALIN 75 MG: 75 CAPSULE ORAL at 10:45

## 2022-07-22 RX ADMIN — HYDROCODONE BITARTRATE AND ACETAMINOPHEN 1 TABLET: 7.5; 325 TABLET ORAL at 10:45

## 2022-07-22 RX ADMIN — Medication 5 MG: at 21:04

## 2022-07-22 RX ADMIN — ATORVASTATIN CALCIUM 10 MG: 10 TABLET, FILM COATED ORAL at 21:04

## 2022-07-22 RX ADMIN — METOPROLOL TARTRATE 25 MG: 25 TABLET, FILM COATED ORAL at 13:32

## 2022-07-22 RX ADMIN — ROPINIROLE HYDROCHLORIDE 0.25 MG: 0.25 TABLET, FILM COATED ORAL at 21:04

## 2022-07-23 ENCOUNTER — APPOINTMENT (OUTPATIENT)
Dept: GENERAL RADIOLOGY | Facility: HOSPITAL | Age: 82
End: 2022-07-23

## 2022-07-23 LAB
ANION GAP SERPL CALCULATED.3IONS-SCNC: 12 MMOL/L (ref 5–15)
BASOPHILS # BLD AUTO: 0.1 10*3/MM3 (ref 0–0.2)
BASOPHILS NFR BLD AUTO: 2 % (ref 0–1.5)
BUN SERPL-MCNC: 30 MG/DL (ref 8–23)
BUN/CREAT SERPL: 34.1 (ref 7–25)
CALCIUM SPEC-SCNC: 8.6 MG/DL (ref 8.6–10.5)
CHLORIDE SERPL-SCNC: 100 MMOL/L (ref 98–107)
CO2 SERPL-SCNC: 26 MMOL/L (ref 22–29)
CREAT SERPL-MCNC: 0.88 MG/DL (ref 0.57–1)
DEPRECATED RDW RBC AUTO: 52.1 FL (ref 37–54)
EGFRCR SERPLBLD CKD-EPI 2021: 66.1 ML/MIN/1.73
EOSINOPHIL # BLD AUTO: 0.3 10*3/MM3 (ref 0–0.4)
EOSINOPHIL NFR BLD AUTO: 4.3 % (ref 0.3–6.2)
ERYTHROCYTE [DISTWIDTH] IN BLOOD BY AUTOMATED COUNT: 17.5 % (ref 12.3–15.4)
GLUCOSE SERPL-MCNC: 102 MG/DL (ref 65–99)
HCT VFR BLD AUTO: 35 % (ref 34–46.6)
HGB BLD-MCNC: 11.3 G/DL (ref 12–15.9)
LYMPHOCYTES # BLD AUTO: 2.5 10*3/MM3 (ref 0.7–3.1)
LYMPHOCYTES NFR BLD AUTO: 41.2 % (ref 19.6–45.3)
MAGNESIUM SERPL-MCNC: 2.2 MG/DL (ref 1.6–2.4)
MCH RBC QN AUTO: 27.1 PG (ref 26.6–33)
MCHC RBC AUTO-ENTMCNC: 32.2 G/DL (ref 31.5–35.7)
MCV RBC AUTO: 84.1 FL (ref 79–97)
MONOCYTES # BLD AUTO: 0.6 10*3/MM3 (ref 0.1–0.9)
MONOCYTES NFR BLD AUTO: 9.7 % (ref 5–12)
NEUTROPHILS NFR BLD AUTO: 2.6 10*3/MM3 (ref 1.7–7)
NEUTROPHILS NFR BLD AUTO: 42.8 % (ref 42.7–76)
NRBC BLD AUTO-RTO: 0 /100 WBC (ref 0–0.2)
PLATELET # BLD AUTO: 314 10*3/MM3 (ref 140–450)
PMV BLD AUTO: 7.8 FL (ref 6–12)
POTASSIUM SERPL-SCNC: 4 MMOL/L (ref 3.5–5.2)
RBC # BLD AUTO: 4.17 10*6/MM3 (ref 3.77–5.28)
SODIUM SERPL-SCNC: 138 MMOL/L (ref 136–145)
WBC NRBC COR # BLD: 6 10*3/MM3 (ref 3.4–10.8)

## 2022-07-23 PROCEDURE — 71045 X-RAY EXAM CHEST 1 VIEW: CPT

## 2022-07-23 PROCEDURE — 83735 ASSAY OF MAGNESIUM: CPT | Performed by: FAMILY MEDICINE

## 2022-07-23 PROCEDURE — 99225 PR SBSQ OBSERVATION CARE/DAY 25 MINUTES: CPT | Performed by: FAMILY MEDICINE

## 2022-07-23 PROCEDURE — 99214 OFFICE O/P EST MOD 30 MIN: CPT | Performed by: INTERNAL MEDICINE

## 2022-07-23 PROCEDURE — 63710000001 ONDANSETRON PER 8 MG: Performed by: NURSE PRACTITIONER

## 2022-07-23 PROCEDURE — 80048 BASIC METABOLIC PNL TOTAL CA: CPT | Performed by: FAMILY MEDICINE

## 2022-07-23 PROCEDURE — 85025 COMPLETE CBC W/AUTO DIFF WBC: CPT | Performed by: FAMILY MEDICINE

## 2022-07-23 PROCEDURE — G0378 HOSPITAL OBSERVATION PER HR: HCPCS

## 2022-07-23 RX ADMIN — Medication 10 ML: at 21:06

## 2022-07-23 RX ADMIN — Medication 10 ML: at 08:15

## 2022-07-23 RX ADMIN — APIXABAN 5 MG: 5 TABLET, FILM COATED ORAL at 21:05

## 2022-07-23 RX ADMIN — LEVOTHYROXINE SODIUM 88 MCG: 0.09 TABLET ORAL at 08:16

## 2022-07-23 RX ADMIN — ROPINIROLE HYDROCHLORIDE 0.25 MG: 0.25 TABLET, FILM COATED ORAL at 21:05

## 2022-07-23 RX ADMIN — METOPROLOL TARTRATE 25 MG: 25 TABLET, FILM COATED ORAL at 21:05

## 2022-07-23 RX ADMIN — PANTOPRAZOLE SODIUM 40 MG: 40 TABLET, DELAYED RELEASE ORAL at 11:31

## 2022-07-23 RX ADMIN — SILDENAFIL 20 MG: 20 TABLET, FILM COATED ORAL at 21:05

## 2022-07-23 RX ADMIN — ATORVASTATIN CALCIUM 10 MG: 10 TABLET, FILM COATED ORAL at 21:05

## 2022-07-23 RX ADMIN — AMIODARONE HYDROCHLORIDE 200 MG: 200 TABLET ORAL at 17:02

## 2022-07-23 RX ADMIN — DIGOXIN 125 MCG: 125 TABLET ORAL at 11:31

## 2022-07-23 RX ADMIN — PREGABALIN 75 MG: 75 CAPSULE ORAL at 08:15

## 2022-07-23 RX ADMIN — APIXABAN 5 MG: 5 TABLET, FILM COATED ORAL at 08:16

## 2022-07-23 RX ADMIN — PREGABALIN 75 MG: 75 CAPSULE ORAL at 21:05

## 2022-07-23 RX ADMIN — AMIODARONE HYDROCHLORIDE 200 MG: 200 TABLET ORAL at 08:16

## 2022-07-23 RX ADMIN — HYDROCODONE BITARTRATE AND ACETAMINOPHEN 1 TABLET: 7.5; 325 TABLET ORAL at 04:14

## 2022-07-23 RX ADMIN — SERTRALINE 50 MG: 50 TABLET, FILM COATED ORAL at 21:05

## 2022-07-23 RX ADMIN — METOPROLOL TARTRATE 25 MG: 25 TABLET, FILM COATED ORAL at 08:16

## 2022-07-23 RX ADMIN — SILDENAFIL 20 MG: 20 TABLET, FILM COATED ORAL at 08:15

## 2022-07-23 RX ADMIN — ONDANSETRON HYDROCHLORIDE 4 MG: 4 TABLET, FILM COATED ORAL at 11:31

## 2022-07-24 LAB
ANION GAP SERPL CALCULATED.3IONS-SCNC: 8 MMOL/L (ref 5–15)
BASOPHILS # BLD AUTO: 0.1 10*3/MM3 (ref 0–0.2)
BASOPHILS NFR BLD AUTO: 1.9 % (ref 0–1.5)
BUN SERPL-MCNC: 23 MG/DL (ref 8–23)
BUN/CREAT SERPL: 25.3 (ref 7–25)
CALCIUM SPEC-SCNC: 8.2 MG/DL (ref 8.6–10.5)
CHLORIDE SERPL-SCNC: 103 MMOL/L (ref 98–107)
CO2 SERPL-SCNC: 28 MMOL/L (ref 22–29)
CREAT SERPL-MCNC: 0.91 MG/DL (ref 0.57–1)
DEPRECATED RDW RBC AUTO: 51.6 FL (ref 37–54)
EGFRCR SERPLBLD CKD-EPI 2021: 63.5 ML/MIN/1.73
EOSINOPHIL # BLD AUTO: 0.3 10*3/MM3 (ref 0–0.4)
EOSINOPHIL NFR BLD AUTO: 4.7 % (ref 0.3–6.2)
ERYTHROCYTE [DISTWIDTH] IN BLOOD BY AUTOMATED COUNT: 17.3 % (ref 12.3–15.4)
GLUCOSE SERPL-MCNC: 109 MG/DL (ref 65–99)
HCT VFR BLD AUTO: 33.6 % (ref 34–46.6)
HGB BLD-MCNC: 10.5 G/DL (ref 12–15.9)
LYMPHOCYTES # BLD AUTO: 2.3 10*3/MM3 (ref 0.7–3.1)
LYMPHOCYTES NFR BLD AUTO: 36.1 % (ref 19.6–45.3)
MAGNESIUM SERPL-MCNC: 2.4 MG/DL (ref 1.6–2.4)
MCH RBC QN AUTO: 26.5 PG (ref 26.6–33)
MCHC RBC AUTO-ENTMCNC: 31.3 G/DL (ref 31.5–35.7)
MCV RBC AUTO: 84.6 FL (ref 79–97)
MONOCYTES # BLD AUTO: 0.7 10*3/MM3 (ref 0.1–0.9)
MONOCYTES NFR BLD AUTO: 10.7 % (ref 5–12)
NEUTROPHILS NFR BLD AUTO: 2.9 10*3/MM3 (ref 1.7–7)
NEUTROPHILS NFR BLD AUTO: 46.6 % (ref 42.7–76)
NRBC BLD AUTO-RTO: 0 /100 WBC (ref 0–0.2)
PLATELET # BLD AUTO: 294 10*3/MM3 (ref 140–450)
PMV BLD AUTO: 7.8 FL (ref 6–12)
POTASSIUM SERPL-SCNC: 4.5 MMOL/L (ref 3.5–5.2)
RBC # BLD AUTO: 3.97 10*6/MM3 (ref 3.77–5.28)
SODIUM SERPL-SCNC: 139 MMOL/L (ref 136–145)
WBC NRBC COR # BLD: 6.3 10*3/MM3 (ref 3.4–10.8)

## 2022-07-24 PROCEDURE — 85025 COMPLETE CBC W/AUTO DIFF WBC: CPT | Performed by: FAMILY MEDICINE

## 2022-07-24 PROCEDURE — 83735 ASSAY OF MAGNESIUM: CPT | Performed by: FAMILY MEDICINE

## 2022-07-24 PROCEDURE — 80048 BASIC METABOLIC PNL TOTAL CA: CPT | Performed by: FAMILY MEDICINE

## 2022-07-24 PROCEDURE — 97161 PT EVAL LOW COMPLEX 20 MIN: CPT

## 2022-07-24 PROCEDURE — 99232 SBSQ HOSP IP/OBS MODERATE 35: CPT | Performed by: FAMILY MEDICINE

## 2022-07-24 PROCEDURE — 99232 SBSQ HOSP IP/OBS MODERATE 35: CPT | Performed by: INTERNAL MEDICINE

## 2022-07-24 RX ADMIN — DIGOXIN 125 MCG: 125 TABLET ORAL at 12:26

## 2022-07-24 RX ADMIN — METOPROLOL TARTRATE 25 MG: 25 TABLET, FILM COATED ORAL at 08:35

## 2022-07-24 RX ADMIN — PREGABALIN 75 MG: 75 CAPSULE ORAL at 20:32

## 2022-07-24 RX ADMIN — METOPROLOL TARTRATE 25 MG: 25 TABLET, FILM COATED ORAL at 20:32

## 2022-07-24 RX ADMIN — PANTOPRAZOLE SODIUM 40 MG: 40 TABLET, DELAYED RELEASE ORAL at 12:26

## 2022-07-24 RX ADMIN — AMIODARONE HYDROCHLORIDE 200 MG: 200 TABLET ORAL at 17:06

## 2022-07-24 RX ADMIN — SERTRALINE 50 MG: 50 TABLET, FILM COATED ORAL at 20:32

## 2022-07-24 RX ADMIN — SILDENAFIL 20 MG: 20 TABLET, FILM COATED ORAL at 20:31

## 2022-07-24 RX ADMIN — HYDROCODONE BITARTRATE AND ACETAMINOPHEN 1 TABLET: 7.5; 325 TABLET ORAL at 05:43

## 2022-07-24 RX ADMIN — SILDENAFIL 20 MG: 20 TABLET, FILM COATED ORAL at 08:35

## 2022-07-24 RX ADMIN — LEVOTHYROXINE SODIUM 88 MCG: 0.09 TABLET ORAL at 08:36

## 2022-07-24 RX ADMIN — Medication 10 ML: at 08:35

## 2022-07-24 RX ADMIN — Medication 10 ML: at 20:32

## 2022-07-24 RX ADMIN — APIXABAN 5 MG: 5 TABLET, FILM COATED ORAL at 20:32

## 2022-07-24 RX ADMIN — PREGABALIN 75 MG: 75 CAPSULE ORAL at 08:36

## 2022-07-24 RX ADMIN — HYDROCODONE BITARTRATE AND ACETAMINOPHEN 1 TABLET: 7.5; 325 TABLET ORAL at 15:48

## 2022-07-24 RX ADMIN — HYDROCODONE BITARTRATE AND ACETAMINOPHEN 1 TABLET: 7.5; 325 TABLET ORAL at 22:11

## 2022-07-24 RX ADMIN — ATORVASTATIN CALCIUM 10 MG: 10 TABLET, FILM COATED ORAL at 20:31

## 2022-07-24 RX ADMIN — ROPINIROLE HYDROCHLORIDE 0.25 MG: 0.25 TABLET, FILM COATED ORAL at 20:31

## 2022-07-24 RX ADMIN — AMIODARONE HYDROCHLORIDE 200 MG: 200 TABLET ORAL at 08:36

## 2022-07-24 RX ADMIN — APIXABAN 5 MG: 5 TABLET, FILM COATED ORAL at 08:35

## 2022-07-25 ENCOUNTER — READMISSION MANAGEMENT (OUTPATIENT)
Dept: CALL CENTER | Facility: HOSPITAL | Age: 82
End: 2022-07-25

## 2022-07-25 ENCOUNTER — HOME HEALTH ADMISSION (OUTPATIENT)
Dept: HOME HEALTH SERVICES | Facility: HOME HEALTHCARE | Age: 82
End: 2022-07-25

## 2022-07-25 VITALS
HEART RATE: 80 BPM | WEIGHT: 148.59 LBS | TEMPERATURE: 98.3 F | DIASTOLIC BLOOD PRESSURE: 62 MMHG | RESPIRATION RATE: 20 BRPM | OXYGEN SATURATION: 92 % | SYSTOLIC BLOOD PRESSURE: 143 MMHG | BODY MASS INDEX: 26.33 KG/M2 | HEIGHT: 63 IN

## 2022-07-25 PROCEDURE — 99239 HOSP IP/OBS DSCHRG MGMT >30: CPT | Performed by: HOSPITALIST

## 2022-07-25 PROCEDURE — 99232 SBSQ HOSP IP/OBS MODERATE 35: CPT | Performed by: INTERNAL MEDICINE

## 2022-07-25 RX ORDER — GABAPENTIN 600 MG/1
600 TABLET ORAL 4 TIMES DAILY
Status: DISCONTINUED | OUTPATIENT
Start: 2022-07-25 | End: 2022-07-25 | Stop reason: HOSPADM

## 2022-07-25 RX ADMIN — METOPROLOL TARTRATE 25 MG: 25 TABLET, FILM COATED ORAL at 08:47

## 2022-07-25 RX ADMIN — GABAPENTIN 600 MG: 600 TABLET, FILM COATED ORAL at 17:04

## 2022-07-25 RX ADMIN — PREGABALIN 75 MG: 75 CAPSULE ORAL at 08:47

## 2022-07-25 RX ADMIN — DIGOXIN 125 MCG: 125 TABLET ORAL at 11:46

## 2022-07-25 RX ADMIN — LEVOTHYROXINE SODIUM 88 MCG: 0.09 TABLET ORAL at 08:47

## 2022-07-25 RX ADMIN — PANTOPRAZOLE SODIUM 40 MG: 40 TABLET, DELAYED RELEASE ORAL at 11:46

## 2022-07-25 RX ADMIN — GABAPENTIN 600 MG: 600 TABLET, FILM COATED ORAL at 11:46

## 2022-07-25 RX ADMIN — APIXABAN 5 MG: 5 TABLET, FILM COATED ORAL at 08:47

## 2022-07-25 RX ADMIN — SILDENAFIL 20 MG: 20 TABLET, FILM COATED ORAL at 08:47

## 2022-07-25 RX ADMIN — AMIODARONE HYDROCHLORIDE 200 MG: 200 TABLET ORAL at 17:04

## 2022-07-25 RX ADMIN — HYDROCODONE BITARTRATE AND ACETAMINOPHEN 1 TABLET: 7.5; 325 TABLET ORAL at 02:41

## 2022-07-25 RX ADMIN — Medication 10 ML: at 08:47

## 2022-07-25 RX ADMIN — AMIODARONE HYDROCHLORIDE 200 MG: 200 TABLET ORAL at 08:47

## 2022-07-26 ENCOUNTER — HOME CARE VISIT (OUTPATIENT)
Dept: HOME HEALTH SERVICES | Facility: HOME HEALTHCARE | Age: 82
End: 2022-07-26

## 2022-07-26 ENCOUNTER — TELEPHONE (OUTPATIENT)
Dept: CARDIOLOGY | Facility: CLINIC | Age: 82
End: 2022-07-26

## 2022-07-26 VITALS
RESPIRATION RATE: 18 BRPM | OXYGEN SATURATION: 96 % | TEMPERATURE: 97.3 F | SYSTOLIC BLOOD PRESSURE: 121 MMHG | HEART RATE: 62 BPM | DIASTOLIC BLOOD PRESSURE: 81 MMHG

## 2022-07-26 PROCEDURE — G0299 HHS/HOSPICE OF RN EA 15 MIN: HCPCS

## 2022-07-26 RX ORDER — DIGOXIN 125 MCG
125 TABLET ORAL
Qty: 90 TABLET | Refills: 2
Start: 2022-07-26 | End: 2022-08-11 | Stop reason: SDUPTHER

## 2022-07-26 RX ORDER — LIDOCAINE 50 MG/G
PATCH TOPICAL
Qty: 90 PATCH | Refills: 11 | Status: SHIPPED | OUTPATIENT
Start: 2022-07-26

## 2022-07-26 RX ORDER — AMIODARONE HYDROCHLORIDE 200 MG/1
200 TABLET ORAL 2 TIMES DAILY WITH MEALS
Qty: 180 TABLET | Refills: 2
Start: 2022-07-26 | End: 2022-08-11 | Stop reason: SDUPTHER

## 2022-07-26 NOTE — TELEPHONE ENCOUNTER
[facility] [phone]    PATIENT DOES NOT HAVE DIGOXIN, AMIODARONE, OR ELIQUIS IN THE HOME. ELIQUIS IS TOO EXPENSIVE FOR PATIENT. [facility] AND THEY DO NOT HAVE THOSE ON FILE FOR PATIENT. SHE IS REQUESTING CALL BACK.

## 2022-07-26 NOTE — HOME HEALTH
Patient had recent hospitalization related to a-fib. She was stabilized and discharged home with home health services.  Patient lives in a single dwelling home, alone, without any caregivers or family to assist her.  Upon review of patient's medications, patient had a bag full of empty pill bottles, some  medications, and a few medications that she is currently prescribed.  SN  out what the patient was currently prescribed that was not outdated and the remainder was to be disposed of; patient verbalized understanding and agreement.  Patient was missing the following medications in her home:    amiodarone - pharmacy did not have RX for; Josephine with Dr Gonzalez notified and will send prescription to patient's pharmacy  apixaban - patient states she is unable to afford this medication; Josephine with Dr Gonzalez notified and stated once we know if the insurance will cover Xarelto or Coumadin, let her know and she will send in that RX  atorvastatin - contacted pharmacy and they will refill  cyanocobalamin- contacted pharmacy and they will refill  digoxin -  pharmacy did not have RX for; Josephine Gonzalez notified and will send prescription to patient's pharmacy  famotidine - contacted pharmacy and they will refill  gabapentin - patient only had two tablets in the home; requested refill from pharmacy  lidocaine 5% patches - contacted pharmacy and they will refill  pantoprazole - contacted pharmacy and they will refill  ropinirole - contacted pharmacy and they will refill  sertraline - contacted pharmacy and they will refill  sildenafil - contacted pharmacy and they will refill  Per pharmacy, all medications aside from the gabapentin that was requested for refill, have not been filled since  (PCP notified).    Patient will need education regarding atrial fibrillation as well as medication management.  She will need assistance and education regarding medi-planner.

## 2022-07-26 NOTE — OUTREACH NOTE
Prep Survey    Flowsheet Row Responses   [facility] patient discharged from? [facility]   Is LACE score < 7 ? No   Emergency Room discharge w/ pulse ox? No   Eligibility Readm Mgmt   Discharge diagnosis Generalized weakness   Does the patient have one of the following disease processes/diagnoses(primary or secondary)? Other   Does the patient have Home health ordered? Yes   What is the Home health agency?  [facility]    Is there a DME ordered? No   General alerts for this patient Hx: CHF    Prep survey completed? Yes          HODAN CABRERA - Registered Nurse

## 2022-07-26 NOTE — TELEPHONE ENCOUNTER
Rx Refill Note  Requested Prescriptions     Pending Prescriptions Disp Refills   • amiodarone (PACERONE) 200 MG tablet 180 tablet 2     Sig: Take 1 tablet by mouth 2 (Two) Times a Day With Meals.   • digoxin (LANOXIN) 125 MCG tablet 90 tablet 2     Sig: Take 1 tablet by mouth Daily.      Last office visit with prescribing clinician: Visit date not found      Next office visit with prescribing clinician: Visit date not found            Shellie Terrell MA  07/26/22, 14:50 EDT

## 2022-07-26 NOTE — CASE COMMUNICATION
The following are the currently prescribed medications that were not in the patient's home upon assessment and intervention taken:    amiodarone - pharmacy did not have RX for; Josephine with Dr Gonzalez notified and will send prescription to patient's pharmacy  apixaban - patient states she is unable to afford this medication; Josephine Gonzalez notified and stated once we know if the insurance will cover Xarelto or Coumadin, let her k now and she will send in that RX  atorvastatin - contacted pharmacy and they will refill  cyanocobalamin- contacted pharmacy and they will refill  digoxin -  pharmacy did not have RX for; Josephine with Dr Gonzalez notified and will send prescription to patient's pharmacy  famotidine - contacted pharmacy and they will refill  gabapentin - patient only had two tablets in the home; requested refill from pharmacy  lidocaine 5% patches - contac obey pharmacy and they will refill  pantoprazole - contacted pharmacy and they will refill  ropinirole - contacted pharmacy and they will refill  sertraline - contacted pharmacy and they will refill  sildenafil - contacted pharmacy and they will refill

## 2022-07-27 ENCOUNTER — HOME CARE VISIT (OUTPATIENT)
Dept: HOME HEALTH SERVICES | Facility: HOME HEALTHCARE | Age: 82
End: 2022-07-27

## 2022-07-27 NOTE — CASE COMMUNICATION
Patient not seen for PT evaluation scheduled this day (7/27/2022) due to no answer of phone or return of voice message left. PT visit moved to 7/29/2022 due SN visit being scheduled on 7/28/2022.

## 2022-07-28 ENCOUNTER — READMISSION MANAGEMENT (OUTPATIENT)
Dept: CALL CENTER | Facility: HOSPITAL | Age: 82
End: 2022-07-28

## 2022-07-28 ENCOUNTER — HOME CARE VISIT (OUTPATIENT)
Dept: HOME HEALTH SERVICES | Facility: HOME HEALTHCARE | Age: 82
End: 2022-07-28

## 2022-07-28 PROCEDURE — G0299 HHS/HOSPICE OF RN EA 15 MIN: HCPCS

## 2022-07-28 NOTE — OUTREACH NOTE
Medical Week 1 Survey    Flowsheet Row Responses   [facility] [place] by [facility] facility patient discharged from? [facility]   Does the patient have one of the following disease processes/diagnoses(primary or secondary)? Other   Week 1 attempt successful? No   Unsuccessful attempts Attempt 1          BILL Coronado Registered Nurse

## 2022-07-29 ENCOUNTER — HOME CARE VISIT (OUTPATIENT)
Dept: HOME HEALTH SERVICES | Facility: HOME HEALTHCARE | Age: 82
End: 2022-07-29

## 2022-07-29 NOTE — HOME HEALTH
Patient is out of multiple medications. Has not been taking any cardiac meds since she has been home because they are ready at the pharmacy, but patient has no way to pick them up. States that she has no family that will go get her medication and that she is unable to drive. SN will try to find some resources to assist patient. All vitals are stable at this time.

## 2022-07-30 ENCOUNTER — HOME CARE VISIT (OUTPATIENT)
Dept: HOME HEALTH SERVICES | Facility: HOME HEALTHCARE | Age: 82
End: 2022-07-30

## 2022-07-30 VITALS
SYSTOLIC BLOOD PRESSURE: 138 MMHG | OXYGEN SATURATION: 99 % | RESPIRATION RATE: 19 BRPM | BODY MASS INDEX: 27.1 KG/M2 | WEIGHT: 153 LBS | DIASTOLIC BLOOD PRESSURE: 63 MMHG | TEMPERATURE: 98.4 F | HEART RATE: 79 BPM

## 2022-07-30 PROCEDURE — G0299 HHS/HOSPICE OF RN EA 15 MIN: HCPCS

## 2022-08-01 ENCOUNTER — HOME CARE VISIT (OUTPATIENT)
Dept: HOME HEALTH SERVICES | Facility: HOME HEALTHCARE | Age: 82
End: 2022-08-01

## 2022-08-01 VITALS
RESPIRATION RATE: 18 BRPM | TEMPERATURE: 98 F | HEART RATE: 90 BPM | SYSTOLIC BLOOD PRESSURE: 142 MMHG | DIASTOLIC BLOOD PRESSURE: 82 MMHG | OXYGEN SATURATION: 98 %

## 2022-08-01 PROCEDURE — G0151 HHCP-SERV OF PT,EA 15 MIN: HCPCS

## 2022-08-02 ENCOUNTER — READMISSION MANAGEMENT (OUTPATIENT)
Dept: CALL CENTER | Facility: HOSPITAL | Age: 82
End: 2022-08-02

## 2022-08-02 ENCOUNTER — HOME CARE VISIT (OUTPATIENT)
Dept: HOME HEALTH SERVICES | Facility: HOME HEALTHCARE | Age: 82
End: 2022-08-02

## 2022-08-02 PROCEDURE — G0299 HHS/HOSPICE OF RN EA 15 MIN: HCPCS

## 2022-08-02 NOTE — HOME HEALTH
"PT Evaluation Summary:  The patient is an 82 year old female admitted to home health services by  on 7/26/2022 following recent hospitalization related to a-fib.    Past Medical History includes:   atrial fibrillation, CKD stage II, pulmonary hypertension, Chronic diastolic CHF (congestive heart failure), Essential hypertension, depression, Generalized anxiety disorder, Restless leg syndrome,    hypothyroidism, fibromyalgia,polypharmacy, chronic low back pain,s/p L4-5 laminectomy in 2018 and lumbar spondylolisthesis.    Prior Functional Level: Independent in the home.    PT assessment this day reveals the problems of general lower extremity weakness (strength rated 4/5 and weakness noted to negatively impact gait and transfers), impaired transfers (requires stand-by assistance), limited ambulation ability (50 feet without device requiring stand-by assistance).    The patient will require the PT interventions of therapeutic exercise, transfer training, gait training and patient education (including home safety and home exercise program (HEP) instruction) to address these problems.    Rehab potential good due to patient mobility relatively good this day in gait.    Plan is to discharge with HEP to care of self.     Session Notes: Patient states that she feels \"sick to her stomach\" and has been staying in bed a lot. She was reluctant to allow PT visit but consented with encouragement. She agreed for formal PT after evaluation today to begin in two weeks. At that time PT will reassess and determine if patient is appropriate for discharge or continued PT. Patient in agreement with this plan.    Upcoming medical appointments:  Cardiologist in 2 weeks per patient.    Plan for next visit:   Patient consents to home exercise program (HEP) provision this day with PT to follow up in 2 weeks for reassessment and HEP finalization with possible discharge to follow."

## 2022-08-02 NOTE — OUTREACH NOTE
Medical Week 1 Survey    Flowsheet Row Responses   [facility] patient discharged from? [facility]   Does the patient have one of the following disease processes/diagnoses(primary or secondary)? Other   Week 1 attempt successful? Yes   Call start time 1421   Call end time 1424   Discharge diagnosis Generalized weakness   Person spoke with today (if not patient) and relationship son   Meds reviewed with patient/caregiver? Yes   Does the patient have all medications ordered at discharge? Yes   Is the patient taking all medications as directed (includes completed medication regime)? Yes   Does the patient have a primary care provider?  Yes   Does the patient have an appointment with their PCP within 7 days of discharge? No   Comments regarding PCP PCP is Dr. Costa not set up yet   What is preventing the patient from scheduling follow up appointments within 7 days of discharge? Haven't had time   Has the patient kept scheduled appointments due by today? Yes   What is the Home health agency?  [facility]    Has home health visited the patient within 72 hours of discharge? Yes   Home health comments HH visited today   Psychosocial issues? No   Did the patient receive a copy of their discharge instructions? Yes   Nursing interventions Reviewed instructions with patient   What is the patient's perception of their health status since discharge? Improving   Is the patient/caregiver able to teach back the hierarchy of who to call/visit for symptoms/problems? PCP, Specialist, Home health nurse, Urgent Care, ED, 911 Yes   If the patient is a current smoker, are they able to teach back resources for cessation? Not a smoker   Week 1 call completed? Yes   Wrap up additional comments Son states patient is doing well.  he denies needs at this time.           AMIE YANG - Registered Nurse

## 2022-08-03 VITALS
OXYGEN SATURATION: 96 % | TEMPERATURE: 98.6 F | DIASTOLIC BLOOD PRESSURE: 80 MMHG | SYSTOLIC BLOOD PRESSURE: 142 MMHG | RESPIRATION RATE: 20 BRPM | HEART RATE: 100 BPM

## 2022-08-03 NOTE — HOME HEALTH
Pt talking with relator about possibley selling home and go into assisted living pt now aware of cost of assist living reports maybe should stay where she is at.Reports picked up what meds were callled in checked them still some not available will call Dr. Zuniga office again to have called in. Pt aware she is forgetful Made herself some hot chocolate during visit gait slow steady reminded to use walker for safety. Noted hert rate and rhythm irregular. Pt reports has not felt like heart has been racing.No edema Reports pain in legs 9/10 takes meds ordered by pain mangement making pain tolerable.    Cardiopulmonary assessment     assess if meds called in and available     assses for falls    assess pain

## 2022-08-05 ENCOUNTER — HOME CARE VISIT (OUTPATIENT)
Dept: HOME HEALTH SERVICES | Facility: HOME HEALTHCARE | Age: 82
End: 2022-08-05

## 2022-08-05 LAB — QT INTERVAL: 402 MS

## 2022-08-09 ENCOUNTER — HOME CARE VISIT (OUTPATIENT)
Dept: HOME HEALTH SERVICES | Facility: HOME HEALTHCARE | Age: 82
End: 2022-08-09

## 2022-08-10 ENCOUNTER — READMISSION MANAGEMENT (OUTPATIENT)
Dept: CALL CENTER | Facility: HOSPITAL | Age: 82
End: 2022-08-10

## 2022-08-10 NOTE — OUTREACH NOTE
Medical Week 2 Survey    Flowsheet Row Responses   [facility] patient discharged from? [facility]   Does the patient have one of the following disease processes/diagnoses(primary or secondary)? Other   Week 2 attempt successful? No   Unsuccessful attempts Attempt 1  [CM notes indicate that Karri fuller indicated that he requests NO calls unless emergency]   General alerts for this patient CM notes indicate that Karri fuller indicated that he requests NO calls unless emergency   Discharge diagnosis Generalized weakness          SAMUEL WHITTEN - Registered Nurse

## 2022-08-11 ENCOUNTER — HOME CARE VISIT (OUTPATIENT)
Dept: HOME HEALTH SERVICES | Facility: HOME HEALTHCARE | Age: 82
End: 2022-08-11

## 2022-08-11 VITALS
SYSTOLIC BLOOD PRESSURE: 150 MMHG | OXYGEN SATURATION: 97 % | HEART RATE: 100 BPM | RESPIRATION RATE: 20 BRPM | TEMPERATURE: 97.1 F | DIASTOLIC BLOOD PRESSURE: 90 MMHG

## 2022-08-11 PROCEDURE — G0299 HHS/HOSPICE OF RN EA 15 MIN: HCPCS

## 2022-08-11 RX ORDER — AMIODARONE HYDROCHLORIDE 200 MG/1
200 TABLET ORAL 2 TIMES DAILY WITH MEALS
Qty: 180 TABLET | Refills: 0
Start: 2022-08-11 | End: 2022-09-02 | Stop reason: HOSPADM

## 2022-08-11 RX ORDER — ATORVASTATIN CALCIUM 10 MG/1
10 TABLET, FILM COATED ORAL NIGHTLY
Qty: 90 TABLET | Refills: 0 | Status: SHIPPED | OUTPATIENT
Start: 2022-08-11

## 2022-08-11 RX ORDER — DIGOXIN 125 MCG
125 TABLET ORAL
Qty: 90 TABLET | Refills: 0
Start: 2022-08-11 | End: 2022-09-14 | Stop reason: SDUPTHER

## 2022-08-12 ENCOUNTER — HOME CARE VISIT (OUTPATIENT)
Dept: HOME HEALTH SERVICES | Facility: HOME HEALTHCARE | Age: 82
End: 2022-08-12

## 2022-08-12 NOTE — HOME HEALTH
Routine Visit Note: up moving about house using rollator gait slow steady. Reports issues with nausea today thinks due to acid in stomach no vomiting reported. Reports taking medication as ordered for reflux. Still has not gotten blood thinner or some of other routine medications.     Skill/education provided: Inst. on need to take meds as ordered. Called message left for Dr. Gonzalez with return call from [facility] on needed meds to be called in reports she had told pt she needed to call insurance to see what blood thinner they will pay for as pt says cant afford copay of Eliquis. Written instructions given to pt with MD number to call once she talkes with insurance reports she will call them. Informed scripst will be called in for Atorvastatin Digoxin and Amiodarone at [facility] in [place],     Patient/caregiver response: Voiced understanding to instructions given today and that meds are to be called in to [facility]. Also voiced understanding that she needs to call [employer] to see which anticoagulant they will cover and will calll Dr. Zuniga office with what she finds out    Plan for next visit: Cardiopulmonary assessment assess pain assess for falls follow up on meds to see if pt had picked up    Other pertinent info: Sees pain management next week. Agreeable to SN visit next week.

## 2022-08-12 NOTE — CASE COMMUNICATION
Visit cancelled for 8/12/2022 pt was seen on Thursday 8/11/2022  Per Medicare guidelines provider must be notified of cancelled visit.

## 2022-08-15 ENCOUNTER — HOME CARE VISIT (OUTPATIENT)
Dept: HOME HEALTH SERVICES | Facility: HOME HEALTHCARE | Age: 82
End: 2022-08-15

## 2022-08-15 VITALS
DIASTOLIC BLOOD PRESSURE: 78 MMHG | HEART RATE: 85 BPM | RESPIRATION RATE: 18 BRPM | SYSTOLIC BLOOD PRESSURE: 138 MMHG | OXYGEN SATURATION: 98 % | TEMPERATURE: 97 F

## 2022-08-15 PROCEDURE — G0151 HHCP-SERV OF PT,EA 15 MIN: HCPCS

## 2022-08-15 NOTE — CASE COMMUNICATION
In review of patient's medications in the home it was discovered that she was reportedly prescribed Sildenafil (REVATIO) 20 MG 2 times daily.  This medication is not in the home.   I called the pharmacy today (8/15/2022) and was advised that Prior Authorization is required for this medication before they can fill the prescription and that physician would need to consult with insurance regarding this medication.   This medication removed  from medication profile in Epic until patient starts this medication.

## 2022-08-15 NOTE — HOME HEALTH
PT Discharge Summary:  The patient is an 82 year old female admitted to home health services by SN on 7/26/2022 following recent hospitalization related to a-fib.    Initial PT assessment (8/1/2022) revealed the problems of general lower extremity weakness (strength rated 4/5 and weakness noted to negatively impact gait and transfers), impaired transfers (requires stand-by assistance), limited ambulation ability (50 feet without device requiring stand-by assistance).    The patient accepted home exercise program (HEP) including light walking. At follow up this day (8/15/2022) patient found to have met all PT goals and to be appropriate for discharge to HEP.    At time of PT discipline discharge the patient remained under the care of home health skilled nursing.    Session Notes: Patient asserts that she is doing much better than when first seen by PT. When PT arrives patient reporting that she has been cleaning her home. She reports that her vacuum  quit working. PT helps patient clear a block in her vacuum  this day, returning it to working condition. Patient encouraged to have family assist her with this as needed. Patient finally consents to discharge from PT as planned for this day.    upcoming medical appointments.  Dr. Shefali Worthy MD 8/20/2022 at 2:40 PM

## 2022-08-15 NOTE — CASE COMMUNICATION
PT Discharge Summary:  The patient is an 82 year old female admitted to home health services by  on 7/26/2022 following recent hospitalization related to a-fib.    Initial PT assessment (8/1/2022) revealed the problems of general lower extremity weakness (strength rated 4/5 and weakness noted to negatively impact gait and transfers), impaired transfers (requires stand-by assistance), limited ambulation ability (50 feet without device  requiring stand-by assistance).    The patient accepted home exercise program (HEP) including light walking. At follow up this day (8/15/2022) patient found to have met all PT goals and to be appropriate for discharge to HEP.    At time of PT discipline discharge the patient remained under the care of home health skilled nursing.

## 2022-08-16 ENCOUNTER — TELEPHONE (OUTPATIENT)
Dept: CARDIOLOGY | Facility: CLINIC | Age: 82
End: 2022-08-16

## 2022-08-16 ENCOUNTER — HOME CARE VISIT (OUTPATIENT)
Dept: HOME HEALTH SERVICES | Facility: HOME HEALTHCARE | Age: 82
End: 2022-08-16

## 2022-08-16 ENCOUNTER — READMISSION MANAGEMENT (OUTPATIENT)
Dept: CALL CENTER | Facility: HOSPITAL | Age: 82
End: 2022-08-16

## 2022-08-16 VITALS
DIASTOLIC BLOOD PRESSURE: 80 MMHG | OXYGEN SATURATION: 99 % | HEART RATE: 96 BPM | SYSTOLIC BLOOD PRESSURE: 146 MMHG | TEMPERATURE: 98.5 F | RESPIRATION RATE: 20 BRPM

## 2022-08-16 PROCEDURE — G0299 HHS/HOSPICE OF RN EA 15 MIN: HCPCS

## 2022-08-16 NOTE — OUTREACH NOTE
Medical Week 4 Survey    Flowsheet Row Responses   [facility] patient discharged from? [facility]   Does the patient have one of the following disease processes/diagnoses(primary or secondary)? Other   Week 4 attempt successful? Yes   Call start time 1659   Call end time 1705   Discharge diagnosis Generalized weakness   Is the patient taking all medications as directed (includes completed medication regime)? Yes   Has the patient kept scheduled appointments due by today? Yes   Psychosocial issues? No   What is the patient's perception of their health status since discharge? New symptoms unrelated to diagnosis   Is the patient/caregiver able to teach back signs and symptoms related to disease process for when to call PCP? Yes   Is the patient/caregiver able to teach back signs and symptoms related to disease process for when to call 911? Yes   Is the patient/caregiver able to teach back the hierarchy of who to call/visit for symptoms/problems? PCP, Specialist, Home health nurse, Urgent Care, ED, 911 Yes   If the patient is a current smoker, are they able to teach back resources for cessation? Not a smoker   Additional teach back comments States she has some issues with her shoulder and wanting to see ortho regarding this and unsure if she needs a referral.  Advised to contact her insurance company regarding this or ortho office to see if they know.     Week 4 Call Completed? Yes   Would the patient like one additional call? No   Graduated Yes          SHANE MEIER - Licensed Nurse

## 2022-08-16 NOTE — TELEPHONE ENCOUNTER
Pt called and stated she needs an Rx for warfarin because she can't afford Eliquis. Needs to be sent to [facility] in [place].

## 2022-08-17 ENCOUNTER — TELEPHONE (OUTPATIENT)
Dept: PAIN MEDICINE | Facility: CLINIC | Age: 82
End: 2022-08-17

## 2022-08-17 NOTE — HOME HEALTH
Routine Visit Note: Moving about house reports doing ok but had nausea this am no emesis. In good spirits. reports she cant find her insurance card to call [employer] with her ID number on it will get new card and call about blood thinner explained importance of taking blood thinner to prevent DVT and possible blood clot. Reports [facility] called and other meds available that she needs to pick them up. Reports has appontment with pain management on Friday the 20th explained Friday is the 19th noted she had 2021 calender hanging she laughed and found the current year. Inst. to call them to see if she has missed appt.     Skill/education provided: Inst. on need to obtain meds and take as ordered to reduce risk of blood clots due to A fib. Inst. on importance of following up on MD appts so she does not mess up her appts.     Patient/caregiver response: Voiced understanding     Plan for next visit: cardiopulmonary assessment Follow up on pain management appt. Follow up to see if called [employer] about blood thinner. Follow up to see if picked meds up from pharmacy and started taking. Assess for falls assess pain

## 2022-08-17 NOTE — TELEPHONE ENCOUNTER
Okay to start warfarin 5 mg and overlap with Eliquis for 2 days and check PT/INR in 3 to 4 days and keep an INR of 2-2.5.

## 2022-08-18 RX ORDER — WARFARIN SODIUM 5 MG/1
5 TABLET ORAL
Qty: 30 TABLET | Refills: 1 | Status: SHIPPED | OUTPATIENT
Start: 2022-08-18 | End: 2022-09-02 | Stop reason: HOSPADM

## 2022-08-19 RX ORDER — WARFARIN SODIUM 5 MG/1
5 TABLET ORAL
Qty: 90 TABLET | OUTPATIENT
Start: 2022-08-19

## 2022-08-19 NOTE — TELEPHONE ENCOUNTER
Refill declined due to Warfarin sent yesterday     pt is new to warfarin and does not have an established dose. Once dose is established 90 days can be sent to pharmacy

## 2022-08-25 ENCOUNTER — HOME CARE VISIT (OUTPATIENT)
Dept: HOME HEALTH SERVICES | Facility: HOME HEALTHCARE | Age: 82
End: 2022-08-25

## 2022-08-25 NOTE — CASE COMMUNICATION
Visit from [facility] was cancelled on 8/25/2022  due to message left on  with no return call from patient will see next week.      For your records only.  As per home health protocol, MD must be notified of missed/cancelled visits; therefore the prescribed frequency was not met.

## 2022-08-29 NOTE — TELEPHONE ENCOUNTER
Spoke to patient, she stated she started the warfarin but did not have any labs done as of yet. Explained to her I spoke to her on 8/16 and she was to call her PCP to inquire if PCP will draw INR and report to Dr Gonzalez. She states she is not sure if she did this yet. Explained to her if she is thick, she can clot and stroke and if she is high she may have a bleeding issue. It is very important she let us know where she wants labs ordered. She will call us back.

## 2022-08-30 ENCOUNTER — HOSPITAL ENCOUNTER (INPATIENT)
Facility: HOSPITAL | Age: 82
LOS: 3 days | Discharge: HOME HEALTH | End: 2022-09-02
Attending: EMERGENCY MEDICINE | Admitting: INTERNAL MEDICINE

## 2022-08-30 ENCOUNTER — HOME CARE VISIT (OUTPATIENT)
Dept: HOME HEALTH SERVICES | Facility: HOME HEALTHCARE | Age: 82
End: 2022-08-30

## 2022-08-30 VITALS
TEMPERATURE: 98.8 F | DIASTOLIC BLOOD PRESSURE: 80 MMHG | SYSTOLIC BLOOD PRESSURE: 130 MMHG | RESPIRATION RATE: 20 BRPM | OXYGEN SATURATION: 97 % | HEART RATE: 128 BPM

## 2022-08-30 DIAGNOSIS — I48.91 ATRIAL FIBRILLATION WITH RVR: Primary | ICD-10-CM

## 2022-08-30 DIAGNOSIS — R11.0 NAUSEA: ICD-10-CM

## 2022-08-30 LAB
ALBUMIN SERPL-MCNC: 4.8 G/DL (ref 3.5–5.2)
ALBUMIN/GLOB SERPL: 1.4 G/DL
ALP SERPL-CCNC: 74 U/L (ref 39–117)
ALT SERPL W P-5'-P-CCNC: 9 U/L (ref 1–33)
AMPHET+METHAMPHET UR QL: NEGATIVE
ANION GAP SERPL CALCULATED.3IONS-SCNC: 16 MMOL/L (ref 5–15)
AST SERPL-CCNC: 21 U/L (ref 1–32)
BACTERIA UR QL AUTO: ABNORMAL /HPF
BACTERIA UR QL AUTO: ABNORMAL /HPF
BARBITURATES UR QL SCN: NEGATIVE
BASOPHILS # BLD AUTO: 0.1 10*3/MM3 (ref 0–0.2)
BASOPHILS NFR BLD AUTO: 0.8 % (ref 0–1.5)
BENZODIAZ UR QL SCN: NEGATIVE
BILIRUB SERPL-MCNC: 2.9 MG/DL (ref 0–1.2)
BILIRUB UR QL STRIP: NEGATIVE
BILIRUB UR QL STRIP: NEGATIVE
BUN SERPL-MCNC: 16 MG/DL (ref 8–23)
BUN/CREAT SERPL: 17.6 (ref 7–25)
CALCIUM SPEC-SCNC: 9.2 MG/DL (ref 8.6–10.5)
CANNABINOIDS SERPL QL: NEGATIVE
CHLORIDE SERPL-SCNC: 104 MMOL/L (ref 98–107)
CLARITY UR: ABNORMAL
CLARITY UR: CLEAR
CO2 SERPL-SCNC: 24 MMOL/L (ref 22–29)
COCAINE UR QL: NEGATIVE
COLOR UR: ABNORMAL
COLOR UR: ABNORMAL
CREAT SERPL-MCNC: 0.91 MG/DL (ref 0.57–1)
D-LACTATE SERPL-SCNC: 1.5 MMOL/L (ref 0.5–2)
DEPRECATED RDW RBC AUTO: 49.9 FL (ref 37–54)
DIGOXIN SERPL-MCNC: <0.3 NG/ML (ref 0.6–1.2)
EGFRCR SERPLBLD CKD-EPI 2021: 63.5 ML/MIN/1.73
EOSINOPHIL # BLD AUTO: 0 10*3/MM3 (ref 0–0.4)
EOSINOPHIL NFR BLD AUTO: 0.1 % (ref 0.3–6.2)
ERYTHROCYTE [DISTWIDTH] IN BLOOD BY AUTOMATED COUNT: 17 % (ref 12.3–15.4)
GLOBULIN UR ELPH-MCNC: 3.4 GM/DL
GLUCOSE SERPL-MCNC: 113 MG/DL (ref 65–99)
GLUCOSE UR STRIP-MCNC: NEGATIVE MG/DL
GLUCOSE UR STRIP-MCNC: NEGATIVE MG/DL
HCT VFR BLD AUTO: 35.1 % (ref 34–46.6)
HGB BLD-MCNC: 11.3 G/DL (ref 12–15.9)
HGB UR QL STRIP.AUTO: ABNORMAL
HGB UR QL STRIP.AUTO: NEGATIVE
HOLD SPECIMEN: NORMAL
HYALINE CASTS UR QL AUTO: ABNORMAL /LPF
HYALINE CASTS UR QL AUTO: ABNORMAL /LPF
INR PPP: 1.28 (ref 2–3)
KETONES UR QL STRIP: ABNORMAL
KETONES UR QL STRIP: ABNORMAL
LEUKOCYTE ESTERASE UR QL STRIP.AUTO: ABNORMAL
LEUKOCYTE ESTERASE UR QL STRIP.AUTO: NEGATIVE
LIPASE SERPL-CCNC: 11 U/L (ref 13–60)
LYMPHOCYTES # BLD AUTO: 1.9 10*3/MM3 (ref 0.7–3.1)
LYMPHOCYTES NFR BLD AUTO: 17.6 % (ref 19.6–45.3)
MAGNESIUM SERPL-MCNC: 2 MG/DL (ref 1.6–2.4)
MCH RBC QN AUTO: 26.4 PG (ref 26.6–33)
MCHC RBC AUTO-ENTMCNC: 32.2 G/DL (ref 31.5–35.7)
MCV RBC AUTO: 82.1 FL (ref 79–97)
METHADONE UR QL SCN: NEGATIVE
MONOCYTES # BLD AUTO: 0.9 10*3/MM3 (ref 0.1–0.9)
MONOCYTES NFR BLD AUTO: 8.7 % (ref 5–12)
NEUTROPHILS NFR BLD AUTO: 7.8 10*3/MM3 (ref 1.7–7)
NEUTROPHILS NFR BLD AUTO: 72.8 % (ref 42.7–76)
NITRITE UR QL STRIP: NEGATIVE
NITRITE UR QL STRIP: NEGATIVE
NRBC BLD AUTO-RTO: 0.1 /100 WBC (ref 0–0.2)
OPIATES UR QL: NEGATIVE
OXYCODONE UR QL SCN: NEGATIVE
PH UR STRIP.AUTO: 5.5 [PH] (ref 5–8)
PH UR STRIP.AUTO: 5.5 [PH] (ref 5–8)
PLATELET # BLD AUTO: 218 10*3/MM3 (ref 140–450)
PMV BLD AUTO: 8.3 FL (ref 6–12)
POTASSIUM SERPL-SCNC: 3.4 MMOL/L (ref 3.5–5.2)
PROT SERPL-MCNC: 8.2 G/DL (ref 6–8.5)
PROT UR QL STRIP: ABNORMAL
PROT UR QL STRIP: ABNORMAL
PROTHROMBIN TIME: 13 SECONDS (ref 19.4–28.5)
RBC # BLD AUTO: 4.28 10*6/MM3 (ref 3.77–5.28)
RBC # UR STRIP: ABNORMAL /HPF
RBC # UR STRIP: ABNORMAL /HPF
REF LAB TEST METHOD: ABNORMAL
REF LAB TEST METHOD: ABNORMAL
SARS-COV-2 RNA PNL SPEC NAA+PROBE: NOT DETECTED
SODIUM SERPL-SCNC: 144 MMOL/L (ref 136–145)
SP GR UR STRIP: 1.01 (ref 1–1.03)
SP GR UR STRIP: 1.02 (ref 1–1.03)
SQUAMOUS #/AREA URNS HPF: ABNORMAL /HPF
SQUAMOUS #/AREA URNS HPF: ABNORMAL /HPF
TROPONIN T SERPL-MCNC: <0.01 NG/ML (ref 0–0.03)
UROBILINOGEN UR QL STRIP: ABNORMAL
UROBILINOGEN UR QL STRIP: ABNORMAL
WBC # UR STRIP: ABNORMAL /HPF
WBC # UR STRIP: ABNORMAL /HPF
WBC NRBC COR # BLD: 10.6 10*3/MM3 (ref 3.4–10.8)

## 2022-08-30 PROCEDURE — 80307 DRUG TEST PRSMV CHEM ANLYZR: CPT

## 2022-08-30 PROCEDURE — 80162 ASSAY OF DIGOXIN TOTAL: CPT

## 2022-08-30 PROCEDURE — 83690 ASSAY OF LIPASE: CPT

## 2022-08-30 PROCEDURE — G0299 HHS/HOSPICE OF RN EA 15 MIN: HCPCS

## 2022-08-30 PROCEDURE — 99285 EMERGENCY DEPT VISIT HI MDM: CPT

## 2022-08-30 PROCEDURE — 83605 ASSAY OF LACTIC ACID: CPT

## 2022-08-30 PROCEDURE — 93005 ELECTROCARDIOGRAM TRACING: CPT

## 2022-08-30 PROCEDURE — 83735 ASSAY OF MAGNESIUM: CPT

## 2022-08-30 PROCEDURE — 85610 PROTHROMBIN TIME: CPT

## 2022-08-30 PROCEDURE — 84484 ASSAY OF TROPONIN QUANT: CPT

## 2022-08-30 PROCEDURE — 87635 SARS-COV-2 COVID-19 AMP PRB: CPT

## 2022-08-30 PROCEDURE — 81001 URINALYSIS AUTO W/SCOPE: CPT

## 2022-08-30 PROCEDURE — 85025 COMPLETE CBC W/AUTO DIFF WBC: CPT

## 2022-08-30 PROCEDURE — 25010000002 ONDANSETRON PER 1 MG

## 2022-08-30 PROCEDURE — 80053 COMPREHEN METABOLIC PANEL: CPT

## 2022-08-30 PROCEDURE — 99222 1ST HOSP IP/OBS MODERATE 55: CPT

## 2022-08-30 RX ORDER — LIDOCAINE 50 MG/G
2 PATCH TOPICAL
Status: DISCONTINUED | OUTPATIENT
Start: 2022-08-31 | End: 2022-09-02 | Stop reason: HOSPADM

## 2022-08-30 RX ORDER — ONDANSETRON 4 MG/1
4 TABLET, FILM COATED ORAL EVERY 6 HOURS PRN
Status: DISCONTINUED | OUTPATIENT
Start: 2022-08-30 | End: 2022-09-02 | Stop reason: HOSPADM

## 2022-08-30 RX ORDER — DIGOXIN 125 MCG
125 TABLET ORAL
Status: DISCONTINUED | OUTPATIENT
Start: 2022-08-31 | End: 2022-09-02 | Stop reason: HOSPADM

## 2022-08-30 RX ORDER — POTASSIUM CHLORIDE 20 MEQ/1
40 TABLET, EXTENDED RELEASE ORAL ONCE
Status: COMPLETED | OUTPATIENT
Start: 2022-08-30 | End: 2022-08-30

## 2022-08-30 RX ORDER — HYDROCODONE BITARTRATE AND ACETAMINOPHEN 5; 325 MG/1; MG/1
1 TABLET ORAL ONCE AS NEEDED
Status: COMPLETED | OUTPATIENT
Start: 2022-08-30 | End: 2022-08-30

## 2022-08-30 RX ORDER — ONDANSETRON 2 MG/ML
4 INJECTION INTRAMUSCULAR; INTRAVENOUS ONCE
Status: COMPLETED | OUTPATIENT
Start: 2022-08-30 | End: 2022-08-30

## 2022-08-30 RX ORDER — ACETAMINOPHEN 650 MG/1
650 SUPPOSITORY RECTAL EVERY 4 HOURS PRN
Status: DISCONTINUED | OUTPATIENT
Start: 2022-08-30 | End: 2022-09-02 | Stop reason: HOSPADM

## 2022-08-30 RX ORDER — CHOLECALCIFEROL (VITAMIN D3) 125 MCG
5 CAPSULE ORAL NIGHTLY PRN
Status: DISCONTINUED | OUTPATIENT
Start: 2022-08-30 | End: 2022-09-02 | Stop reason: HOSPADM

## 2022-08-30 RX ORDER — ALUMINA, MAGNESIA, AND SIMETHICONE 2400; 2400; 240 MG/30ML; MG/30ML; MG/30ML
15 SUSPENSION ORAL EVERY 6 HOURS PRN
Status: DISCONTINUED | OUTPATIENT
Start: 2022-08-30 | End: 2022-09-02 | Stop reason: HOSPADM

## 2022-08-30 RX ORDER — LEVOTHYROXINE SODIUM 88 UG/1
88 TABLET ORAL
Status: DISCONTINUED | OUTPATIENT
Start: 2022-08-31 | End: 2022-08-31

## 2022-08-30 RX ORDER — SODIUM CHLORIDE 0.9 % (FLUSH) 0.9 %
10 SYRINGE (ML) INJECTION AS NEEDED
Status: DISCONTINUED | OUTPATIENT
Start: 2022-08-30 | End: 2022-09-02 | Stop reason: HOSPADM

## 2022-08-30 RX ORDER — DILTIAZEM HCL/D5W 125 MG/125
5-15 PLASTIC BAG, INJECTION (ML) INTRAVENOUS CONTINUOUS
Status: DISCONTINUED | OUTPATIENT
Start: 2022-08-30 | End: 2022-09-02 | Stop reason: HOSPADM

## 2022-08-30 RX ORDER — ACETAMINOPHEN 325 MG/1
650 TABLET ORAL EVERY 4 HOURS PRN
Status: DISCONTINUED | OUTPATIENT
Start: 2022-08-30 | End: 2022-09-02 | Stop reason: HOSPADM

## 2022-08-30 RX ORDER — HYDROCODONE BITARTRATE AND ACETAMINOPHEN 7.5; 325 MG/1; MG/1
1 TABLET ORAL 4 TIMES DAILY PRN
Status: DISCONTINUED | OUTPATIENT
Start: 2022-08-30 | End: 2022-09-02 | Stop reason: HOSPADM

## 2022-08-30 RX ORDER — ATORVASTATIN CALCIUM 10 MG/1
10 TABLET, FILM COATED ORAL NIGHTLY
Status: DISCONTINUED | OUTPATIENT
Start: 2022-08-31 | End: 2022-09-02 | Stop reason: HOSPADM

## 2022-08-30 RX ORDER — GABAPENTIN 600 MG/1
600 TABLET ORAL 4 TIMES DAILY
Status: DISCONTINUED | OUTPATIENT
Start: 2022-08-31 | End: 2022-08-31

## 2022-08-30 RX ORDER — ACETAMINOPHEN 160 MG/5ML
650 SOLUTION ORAL EVERY 4 HOURS PRN
Status: DISCONTINUED | OUTPATIENT
Start: 2022-08-30 | End: 2022-09-02 | Stop reason: HOSPADM

## 2022-08-30 RX ORDER — NITROGLYCERIN 0.4 MG/1
0.4 TABLET SUBLINGUAL
Status: DISCONTINUED | OUTPATIENT
Start: 2022-08-30 | End: 2022-09-02 | Stop reason: HOSPADM

## 2022-08-30 RX ORDER — SODIUM CHLORIDE 0.9 % (FLUSH) 0.9 %
10 SYRINGE (ML) INJECTION EVERY 12 HOURS SCHEDULED
Status: DISCONTINUED | OUTPATIENT
Start: 2022-08-30 | End: 2022-09-02 | Stop reason: HOSPADM

## 2022-08-30 RX ORDER — ONDANSETRON 2 MG/ML
4 INJECTION INTRAMUSCULAR; INTRAVENOUS EVERY 6 HOURS PRN
Status: DISCONTINUED | OUTPATIENT
Start: 2022-08-30 | End: 2022-09-02 | Stop reason: HOSPADM

## 2022-08-30 RX ADMIN — HYDROCODONE BITARTRATE AND ACETAMINOPHEN 1 TABLET: 5; 325 TABLET ORAL at 20:39

## 2022-08-30 RX ADMIN — Medication 10 ML: at 21:45

## 2022-08-30 RX ADMIN — Medication 15 MG/HR: at 21:49

## 2022-08-30 RX ADMIN — POTASSIUM CHLORIDE 40 MEQ: 1500 TABLET, EXTENDED RELEASE ORAL at 19:20

## 2022-08-30 RX ADMIN — ONDANSETRON 4 MG: 2 INJECTION INTRAMUSCULAR; INTRAVENOUS at 16:11

## 2022-08-30 RX ADMIN — SODIUM CHLORIDE 1000 ML: 9 INJECTION, SOLUTION INTRAVENOUS at 16:00

## 2022-08-30 RX ADMIN — Medication 5 MG/HR: at 16:00

## 2022-08-31 ENCOUNTER — HOME CARE VISIT (OUTPATIENT)
Dept: HOME HEALTH SERVICES | Facility: HOME HEALTHCARE | Age: 82
End: 2022-08-31

## 2022-08-31 LAB
ANION GAP SERPL CALCULATED.3IONS-SCNC: 14 MMOL/L (ref 5–15)
BASOPHILS # BLD AUTO: 0.1 10*3/MM3 (ref 0–0.2)
BASOPHILS NFR BLD AUTO: 0.9 % (ref 0–1.5)
BUN SERPL-MCNC: 18 MG/DL (ref 8–23)
BUN/CREAT SERPL: 20.5 (ref 7–25)
CALCIUM SPEC-SCNC: 8.4 MG/DL (ref 8.6–10.5)
CHLORIDE SERPL-SCNC: 107 MMOL/L (ref 98–107)
CO2 SERPL-SCNC: 21 MMOL/L (ref 22–29)
CREAT SERPL-MCNC: 0.88 MG/DL (ref 0.57–1)
DEPRECATED RDW RBC AUTO: 51.2 FL (ref 37–54)
EGFRCR SERPLBLD CKD-EPI 2021: 66.1 ML/MIN/1.73
EOSINOPHIL # BLD AUTO: 0.2 10*3/MM3 (ref 0–0.4)
EOSINOPHIL NFR BLD AUTO: 2.5 % (ref 0.3–6.2)
ERYTHROCYTE [DISTWIDTH] IN BLOOD BY AUTOMATED COUNT: 17.4 % (ref 12.3–15.4)
GLUCOSE SERPL-MCNC: 97 MG/DL (ref 65–99)
HCT VFR BLD AUTO: 30.6 % (ref 34–46.6)
HGB BLD-MCNC: 9.7 G/DL (ref 12–15.9)
INR PPP: 1.29 (ref 2–3)
LYMPHOCYTES # BLD AUTO: 2.8 10*3/MM3 (ref 0.7–3.1)
LYMPHOCYTES NFR BLD AUTO: 39.4 % (ref 19.6–45.3)
MAGNESIUM SERPL-MCNC: 2 MG/DL (ref 1.6–2.4)
MCH RBC QN AUTO: 26.4 PG (ref 26.6–33)
MCHC RBC AUTO-ENTMCNC: 31.6 G/DL (ref 31.5–35.7)
MCV RBC AUTO: 83.5 FL (ref 79–97)
MONOCYTES # BLD AUTO: 0.8 10*3/MM3 (ref 0.1–0.9)
MONOCYTES NFR BLD AUTO: 11.2 % (ref 5–12)
NEUTROPHILS NFR BLD AUTO: 3.2 10*3/MM3 (ref 1.7–7)
NEUTROPHILS NFR BLD AUTO: 46 % (ref 42.7–76)
NRBC BLD AUTO-RTO: 0.2 /100 WBC (ref 0–0.2)
PHOSPHATE SERPL-MCNC: 2.6 MG/DL (ref 2.5–4.5)
PLATELET # BLD AUTO: 195 10*3/MM3 (ref 140–450)
PMV BLD AUTO: 8.2 FL (ref 6–12)
POTASSIUM SERPL-SCNC: 3.6 MMOL/L (ref 3.5–5.2)
PROTHROMBIN TIME: 13.1 SECONDS (ref 19.4–28.5)
RBC # BLD AUTO: 3.67 10*6/MM3 (ref 3.77–5.28)
SODIUM SERPL-SCNC: 142 MMOL/L (ref 136–145)
TSH SERPL DL<=0.05 MIU/L-ACNC: 12.68 UIU/ML (ref 0.27–4.2)
WBC NRBC COR # BLD: 7 10*3/MM3 (ref 3.4–10.8)

## 2022-08-31 PROCEDURE — 80048 BASIC METABOLIC PNL TOTAL CA: CPT

## 2022-08-31 PROCEDURE — 85025 COMPLETE CBC W/AUTO DIFF WBC: CPT

## 2022-08-31 PROCEDURE — 25010000002 ENOXAPARIN PER 10 MG

## 2022-08-31 PROCEDURE — 85610 PROTHROMBIN TIME: CPT

## 2022-08-31 PROCEDURE — 84443 ASSAY THYROID STIM HORMONE: CPT

## 2022-08-31 PROCEDURE — 84100 ASSAY OF PHOSPHORUS: CPT | Performed by: INTERNAL MEDICINE

## 2022-08-31 PROCEDURE — 36415 COLL VENOUS BLD VENIPUNCTURE: CPT

## 2022-08-31 PROCEDURE — 83735 ASSAY OF MAGNESIUM: CPT | Performed by: INTERNAL MEDICINE

## 2022-08-31 PROCEDURE — 99221 1ST HOSP IP/OBS SF/LOW 40: CPT | Performed by: NURSE PRACTITIONER

## 2022-08-31 PROCEDURE — 99232 SBSQ HOSP IP/OBS MODERATE 35: CPT | Performed by: INTERNAL MEDICINE

## 2022-08-31 PROCEDURE — 25010000002 ONDANSETRON PER 1 MG

## 2022-08-31 RX ORDER — MAGNESIUM SULFATE HEPTAHYDRATE 40 MG/ML
2 INJECTION, SOLUTION INTRAVENOUS AS NEEDED
Status: DISCONTINUED | OUTPATIENT
Start: 2022-08-31 | End: 2022-09-02 | Stop reason: HOSPADM

## 2022-08-31 RX ORDER — POTASSIUM CHLORIDE 1.5 G/1.77G
40 POWDER, FOR SOLUTION ORAL AS NEEDED
Status: DISCONTINUED | OUTPATIENT
Start: 2022-08-31 | End: 2022-09-02 | Stop reason: HOSPADM

## 2022-08-31 RX ORDER — ENOXAPARIN SODIUM 100 MG/ML
70 INJECTION SUBCUTANEOUS ONCE
Status: COMPLETED | OUTPATIENT
Start: 2022-08-31 | End: 2022-08-31

## 2022-08-31 RX ORDER — LEVOTHYROXINE SODIUM 112 UG/1
112 TABLET ORAL
Status: DISCONTINUED | OUTPATIENT
Start: 2022-09-01 | End: 2022-09-02 | Stop reason: HOSPADM

## 2022-08-31 RX ORDER — WARFARIN SODIUM 5 MG/1
5 TABLET ORAL ONCE
Status: COMPLETED | OUTPATIENT
Start: 2022-08-31 | End: 2022-08-31

## 2022-08-31 RX ORDER — GABAPENTIN 600 MG/1
300 TABLET ORAL EVERY 8 HOURS SCHEDULED
Status: DISCONTINUED | OUTPATIENT
Start: 2022-08-31 | End: 2022-09-02 | Stop reason: HOSPADM

## 2022-08-31 RX ORDER — PANTOPRAZOLE SODIUM 40 MG/1
40 TABLET, DELAYED RELEASE ORAL
Status: DISCONTINUED | OUTPATIENT
Start: 2022-09-01 | End: 2022-09-02 | Stop reason: HOSPADM

## 2022-08-31 RX ORDER — DILTIAZEM HYDROCHLORIDE 120 MG/1
120 CAPSULE, COATED, EXTENDED RELEASE ORAL
Status: DISCONTINUED | OUTPATIENT
Start: 2022-08-31 | End: 2022-09-02 | Stop reason: HOSPADM

## 2022-08-31 RX ORDER — ROPINIROLE 0.25 MG/1
0.25 TABLET, FILM COATED ORAL NIGHTLY
Status: DISCONTINUED | OUTPATIENT
Start: 2022-08-31 | End: 2022-09-02 | Stop reason: HOSPADM

## 2022-08-31 RX ORDER — MAGNESIUM SULFATE HEPTAHYDRATE 40 MG/ML
4 INJECTION, SOLUTION INTRAVENOUS AS NEEDED
Status: DISCONTINUED | OUTPATIENT
Start: 2022-08-31 | End: 2022-09-02 | Stop reason: HOSPADM

## 2022-08-31 RX ORDER — POTASSIUM CHLORIDE 20 MEQ/1
40 TABLET, EXTENDED RELEASE ORAL AS NEEDED
Status: DISCONTINUED | OUTPATIENT
Start: 2022-08-31 | End: 2022-09-02 | Stop reason: HOSPADM

## 2022-08-31 RX ORDER — ENOXAPARIN SODIUM 100 MG/ML
1 INJECTION SUBCUTANEOUS EVERY 12 HOURS
Status: DISCONTINUED | OUTPATIENT
Start: 2022-08-31 | End: 2022-08-31

## 2022-08-31 RX ADMIN — Medication 2.5 MG/HR: at 18:33

## 2022-08-31 RX ADMIN — HYDROCODONE BITARTRATE AND ACETAMINOPHEN 1 TABLET: 7.5; 325 TABLET ORAL at 01:35

## 2022-08-31 RX ADMIN — SERTRALINE HYDROCHLORIDE 50 MG: 50 TABLET, FILM COATED ORAL at 00:26

## 2022-08-31 RX ADMIN — WARFARIN 5 MG: 5 TABLET ORAL at 00:31

## 2022-08-31 RX ADMIN — GABAPENTIN 600 MG: 600 TABLET, FILM COATED ORAL at 08:45

## 2022-08-31 RX ADMIN — LEVOTHYROXINE SODIUM 88 MCG: 0.09 TABLET ORAL at 05:34

## 2022-08-31 RX ADMIN — ATORVASTATIN CALCIUM 10 MG: 10 TABLET, FILM COATED ORAL at 21:55

## 2022-08-31 RX ADMIN — Medication 10 ML: at 08:30

## 2022-08-31 RX ADMIN — APIXABAN 5 MG: 5 TABLET, FILM COATED ORAL at 14:26

## 2022-08-31 RX ADMIN — GABAPENTIN 600 MG: 600 TABLET, FILM COATED ORAL at 12:50

## 2022-08-31 RX ADMIN — ONDANSETRON 4 MG: 2 INJECTION INTRAMUSCULAR; INTRAVENOUS at 09:41

## 2022-08-31 RX ADMIN — GABAPENTIN 600 MG: 600 TABLET, FILM COATED ORAL at 00:26

## 2022-08-31 RX ADMIN — HYDROCODONE BITARTRATE AND ACETAMINOPHEN 1 TABLET: 7.5; 325 TABLET ORAL at 21:51

## 2022-08-31 RX ADMIN — HYDROCODONE BITARTRATE AND ACETAMINOPHEN 1 TABLET: 7.5; 325 TABLET ORAL at 05:35

## 2022-08-31 RX ADMIN — APIXABAN 5 MG: 5 TABLET, FILM COATED ORAL at 21:55

## 2022-08-31 RX ADMIN — ATORVASTATIN CALCIUM 10 MG: 10 TABLET, FILM COATED ORAL at 00:26

## 2022-08-31 RX ADMIN — Medication 10 ML: at 21:52

## 2022-08-31 RX ADMIN — DILTIAZEM HYDROCHLORIDE 120 MG: 120 CAPSULE, COATED, EXTENDED RELEASE ORAL at 15:33

## 2022-08-31 RX ADMIN — DIGOXIN 125 MCG: 125 TABLET ORAL at 12:50

## 2022-08-31 RX ADMIN — GABAPENTIN 300 MG: 600 TABLET, FILM COATED ORAL at 21:58

## 2022-08-31 RX ADMIN — ENOXAPARIN SODIUM 70 MG: 100 INJECTION SUBCUTANEOUS at 00:31

## 2022-08-31 RX ADMIN — METOPROLOL TARTRATE 25 MG: 25 TABLET, FILM COATED ORAL at 21:52

## 2022-08-31 RX ADMIN — SERTRALINE HYDROCHLORIDE 50 MG: 50 TABLET, FILM COATED ORAL at 21:51

## 2022-08-31 RX ADMIN — METOPROLOL TARTRATE 25 MG: 25 TABLET, FILM COATED ORAL at 08:30

## 2022-08-31 RX ADMIN — ACETAMINOPHEN 650 MG: 325 TABLET, FILM COATED ORAL at 19:40

## 2022-08-31 RX ADMIN — ROPINIROLE HYDROCHLORIDE 0.25 MG: 0.25 TABLET, FILM COATED ORAL at 21:51

## 2022-08-31 NOTE — TELEPHONE ENCOUNTER
Called art with Home health verbal orders given to check INR at next home visit. Pt is currently inpt for Afib.

## 2022-08-31 NOTE — HOME HEALTH
Routine Visit Note: Pt c.o. N/V headache and and some discomfort with voiding did not c.o. burning. Noted AHR to be 128 irregular noted pt had gotten Warfarin said she was taking counted pills only 1 taken was filled 8/18 not sure when she got. Inst. these pills are important to take so she does not develop blood clots due to A fib. pt is not the most compliant pt with meds and care. Denies chest pain reports occ shortness of breath when heart beating fast reports can fill it at times flip flopping. Called Dr. Phelps spoke with  Melania her nurse told of pts complaints and heartrate said to tell pt she needed to go to ER and be checked out to see if something going on. Informed pt of same     Skill/education provided: Cardiopulmonary assessment inst. must take blood thinner as ordered if she does not want to develop blood clot with potential of pulmonary embolus if clot breaks off due to a fib. Inst. that Dr. Phelps wanted her to go to ER to be checked out. Inst. she may no longer take Excedrin had a friend bring her some for headache told her could not take unless aspirin free told to take only Tylenol for headache.     Patient/caregiver response: Voiced understanding of instructions poor compliance with meds from past experiences     Plan for next visit: Cardiopulmonary assessment assess if taking Warfarin count pills 29 in bottle 8/30/2022 assess N/V    Other pertinent info: Left message for jonny Zeng that pt may call 911 and go to ER as was instructed by MD He returned call back non concerned said she does this all the time explained to her heart rate rapid he said how did I know told him I had counted it and listened to it he thougth she had told me it was rapid. Told him no I had checked her over he wanted to know is she was out of her pain med told him she didnt mention pain pills or need for them so I was not aware. Told him only called if he came by and she wasnt home may be at [facility]. Called report to Samaria at [facility]  ER that pt may come in via ambulance and complaints.

## 2022-09-01 LAB
INR PPP: 2.11 (ref 2–3)
PROTHROMBIN TIME: 20.8 SECONDS (ref 19.4–28.5)

## 2022-09-01 PROCEDURE — 99232 SBSQ HOSP IP/OBS MODERATE 35: CPT | Performed by: INTERNAL MEDICINE

## 2022-09-01 PROCEDURE — 25010000002 ONDANSETRON PER 1 MG

## 2022-09-01 PROCEDURE — 85610 PROTHROMBIN TIME: CPT

## 2022-09-01 PROCEDURE — 97161 PT EVAL LOW COMPLEX 20 MIN: CPT

## 2022-09-01 RX ORDER — DILTIAZEM HYDROCHLORIDE 5 MG/ML
10 INJECTION INTRAVENOUS ONCE
Status: COMPLETED | OUTPATIENT
Start: 2022-09-01 | End: 2022-09-01

## 2022-09-01 RX ADMIN — ONDANSETRON 4 MG: 2 INJECTION INTRAMUSCULAR; INTRAVENOUS at 06:06

## 2022-09-01 RX ADMIN — HYDROCODONE BITARTRATE AND ACETAMINOPHEN 1 TABLET: 7.5; 325 TABLET ORAL at 22:37

## 2022-09-01 RX ADMIN — HYDROCODONE BITARTRATE AND ACETAMINOPHEN 1 TABLET: 7.5; 325 TABLET ORAL at 16:10

## 2022-09-01 RX ADMIN — LEVOTHYROXINE SODIUM 112 MCG: 0.11 TABLET ORAL at 06:02

## 2022-09-01 RX ADMIN — Medication 10 ML: at 08:11

## 2022-09-01 RX ADMIN — GABAPENTIN 300 MG: 600 TABLET, FILM COATED ORAL at 14:41

## 2022-09-01 RX ADMIN — GABAPENTIN 300 MG: 600 TABLET, FILM COATED ORAL at 21:26

## 2022-09-01 RX ADMIN — PANTOPRAZOLE SODIUM 40 MG: 40 TABLET, DELAYED RELEASE ORAL at 12:02

## 2022-09-01 RX ADMIN — APIXABAN 5 MG: 5 TABLET, FILM COATED ORAL at 21:25

## 2022-09-01 RX ADMIN — ROPINIROLE HYDROCHLORIDE 0.25 MG: 0.25 TABLET, FILM COATED ORAL at 21:25

## 2022-09-01 RX ADMIN — DIGOXIN 125 MCG: 125 TABLET ORAL at 12:02

## 2022-09-01 RX ADMIN — GABAPENTIN 300 MG: 600 TABLET, FILM COATED ORAL at 06:02

## 2022-09-01 RX ADMIN — DILTIAZEM HYDROCHLORIDE 120 MG: 120 CAPSULE, COATED, EXTENDED RELEASE ORAL at 08:11

## 2022-09-01 RX ADMIN — LIDOCAINE 2 PATCH: 50 PATCH TOPICAL at 21:25

## 2022-09-01 RX ADMIN — METOPROLOL TARTRATE 25 MG: 25 TABLET, FILM COATED ORAL at 08:11

## 2022-09-01 RX ADMIN — SERTRALINE HYDROCHLORIDE 50 MG: 50 TABLET, FILM COATED ORAL at 21:25

## 2022-09-01 RX ADMIN — ATORVASTATIN CALCIUM 10 MG: 10 TABLET, FILM COATED ORAL at 21:26

## 2022-09-01 RX ADMIN — APIXABAN 5 MG: 5 TABLET, FILM COATED ORAL at 08:11

## 2022-09-01 RX ADMIN — METOPROLOL TARTRATE 25 MG: 25 TABLET, FILM COATED ORAL at 21:28

## 2022-09-01 RX ADMIN — Medication 10 ML: at 21:26

## 2022-09-01 RX ADMIN — DILTIAZEM HYDROCHLORIDE 10 MG: 5 INJECTION INTRAVENOUS at 17:53

## 2022-09-01 NOTE — PROGRESS NOTES
[facility] Medicine Services Daily Progress Note    Patient Name: Catalina Moreno  : 1940  MRN: [id number]  Primary Care Physician:  Anayeli Costa APRN  Date of admission: 2022      Subjective      Chief Complaint: Fatigue weakness      Patient Reports she still feeling off.  Heart rate still elevated at 120.  Patient states she will be able to afford Eliquis at $42 a month.    ROS negative except as above      Objective      Vitals:   Temp:  [97.5 °F (36.4 °C)-98.4 °F (36.9 °C)] 98 °F (36.7 °C)  Heart Rate:  [] 116  Resp:  [18-22] 20  BP: ()/(38-96) 111/53  Flow (L/min):  [2] 2    Physical Exam  Vitals reviewed.   HENT:      Head: Normocephalic.      Nose: Nose normal.      Mouth/Throat:      Mouth: Mucous membranes are moist.   Cardiovascular:      Rate and Rhythm: Tachycardia present. Rhythm irregular.      Pulses: Normal pulses.      Heart sounds: Normal heart sounds.   Pulmonary:      Effort: Pulmonary effort is normal.      Breath sounds: Normal breath sounds.   Abdominal:      General: Abdomen is flat.      Palpations: Abdomen is soft.   Musculoskeletal:         General: Normal range of motion.      Cervical back: Normal range of motion.   Skin:     General: Skin is warm.   Neurological:      General: No focal deficit present.      Mental Status: She is alert and oriented to person, place, and time.   Psychiatric:         Mood and Affect: Mood normal.         Behavior: Behavior normal.             Result Review    Result Review:  I have personally reviewed the results from the time of this admission to 2022 20:00 EDT and agree with these findings:  [x]  Laboratory  []  Microbiology  []  Radiology  []  EKG/Telemetry   []  Cardiology/Vascular   []  Pathology  []  Old records  []  Other:  Most notable findings include: Hemoglobin 9.7        Assessment & Plan    82-year-old female with A. fib with RVR     Active Hospital Problems:       Active Hospital  Problems     Diagnosis     • **Atrial fibrillation with RVR (HCC)     • Congestive heart failure (HCC)     • Chronic pain disorder     • Restless legs syndrome     • Generalized anxiety disorder     • Hypothyroidism, unspecified     • Stage 2 chronic kidney disease     • Neuropathic pain     • Essential hypertension        Plan:      Atrial fibrillation with RVR  -EKG reviewed  -Cardizem bolus and Cardizem drip started in ED  -Continue Cardizem  -Continuous cardiac monitoring  -Continue home digoxin  -Continue Eliquis.  Patient states she will be able to pay 42 hours a month  -Cardiology consulted     Chronic systolic heart failure  -EF 46 to 50% on 3/5/2022  -Continue home metoprolol     Chronic pain  -Stable  -Continue home Neurontin, [place] (inspect verified)  -Continue home Lidoderm patch     Depression  -Chronic, stable  -Continue home Zoloft     Hypothyroidism  -TSH ordered  -Continue home levothyroxine        DVT prophylaxis:  No DVT prophylaxis order currently exists.     CODE STATUS:    Code Status (Patient has no pulse and is not breathing): CPR (Attempt to Resuscitate)  Medical Interventions (Patient has pulse or is breathing): Full Support     Admission Status:  I believe this patient meets inpatient status.     I discussed the patient's findings and my recommendations with patient.     This patient has been examined wearing appropriate Personal Protective Equipment    08/31/22      Electronically signed by Dom Murray MD, 08/31/22, 20:00 EDT.  [facility]ist Team

## 2022-09-01 NOTE — PLAN OF CARE
Goal Outcome Evaluation:  Plan of Care Reviewed With: patient           Outcome Evaluation: 81 yo female adm 8/30/22 for nausea, a fib w/ rvr. Hx of EF 46-55%, fibromyalgia, a fib, cad. At baseline, pt lives alone in a single level home. Her son stops to check on her daily, but she expresses interest in a life alert system. She ambulates indepndently using a rollator wx. No home O2. Pt does her own driving and grocery shopping. However, she notes that she gets very tired when doing her own shopping. Her son has been trying to help w/ this. Today, pt presents w/ vital signs which were stable throughout the rx. She has mild weakness in BLEs. Pt is indepndent for bed mobility, coming to stand at rw, trasnferring on/off bsc, and ambulates 40 ft w/ rw and supervision only. Pt does not need IP PT at this time, but she would benefit from HH at d/c to address decreased endurance. Recommend home w/ HH at d/c. Will sign off at this time.

## 2022-09-01 NOTE — PROGRESS NOTES
Referring Provider: Hospitalist    Reason for follow-up: Palpitations     Patient Care Team:  Anayeli Costa APRN as PCP - General (Family Medicine)  Barrett Evans MD as Consulting Physician (Cardiology)    Subjective .  Patient is feeling better with less palpitations    Objective  Lying in bed comfortably     Review of Systems   Constitutional: Negative for fever and malaise/fatigue.   Cardiovascular: Positive for palpitations. Negative for chest pain and dyspnea on exertion.   Respiratory: Negative for cough and shortness of breath.    Skin: Negative for rash.   Gastrointestinal: Negative for abdominal pain, nausea and vomiting.   Neurological: Negative for focal weakness and headaches.   All other systems reviewed and are negative.      Baclofen, Codeine, Ibuprofen, Methocarbamol, Naproxen, Tizanidine hcl, and Tolmetin    Scheduled Meds:apixaban, 5 mg, Oral, Q12H  atorvastatin, 10 mg, Oral, Nightly  digoxin, 125 mcg, Oral, Daily  dilTIAZem CD, 120 mg, Oral, Q24H  gabapentin, 300 mg, Oral, Q8H  levothyroxine, 112 mcg, Oral, Q AM  lidocaine, 2 patch, Transdermal, Q24H  metoprolol tartrate, 25 mg, Oral, Q12H  pantoprazole, 40 mg, Oral, Daily With Lunch  rOPINIRole, 0.25 mg, Oral, Nightly  sertraline, 50 mg, Oral, Nightly  sodium chloride, 10 mL, Intravenous, Q12H      Continuous Infusions:dilTIAZem, 5-15 mg/hr, Last Rate: Stopped (09/01/22 0912)      PRN Meds:.•  acetaminophen **OR** acetaminophen **OR** acetaminophen  •  aluminum-magnesium hydroxide-simethicone  •  HYDROcodone-acetaminophen  •  magnesium sulfate **OR** magnesium sulfate **OR** magnesium sulfate  •  melatonin  •  nitroglycerin  •  ondansetron **OR** ondansetron  •  potassium & sodium phosphates **OR** potassium & sodium phosphates  •  potassium chloride  •  potassium chloride  •  [COMPLETED] Insert peripheral IV **AND** sodium chloride  •  sodium chloride        VITAL SIGNS  Vitals:    09/01/22 0937 09/01/22 1009 09/01/22 1202  "09/01/22 1435   BP: 117/43 117/56  120/41   BP Location:       Patient Position:       Pulse: 71 72 72 81   Resp:  18  18   Temp:  97.5 °F (36.4 °C)  98.3 °F (36.8 °C)   TempSrc:  Oral  Oral   SpO2: 94% 95%  97%   Weight:       Height:           Flowsheet Rows    Flowsheet Row First Filed Value   Admission Height 160 cm (63\") Documented at 08/30/2022 1431   Admission Weight 68 kg (150 lb) Documented at 08/30/2022 1431           TELEMETRY: Atrial fibrillation    Physical Exam:  Constitutional:       Appearance: Well-developed.   Eyes:      General: No scleral icterus.     Conjunctiva/sclera: Conjunctivae normal.   HENT:      Head: Normocephalic and atraumatic.   Neck:      Vascular: No carotid bruit or JVD.   Pulmonary:      Effort: Pulmonary effort is normal.      Breath sounds: Normal breath sounds. No wheezing. No rales.   Cardiovascular:      Normal rate. Irregularly irregular rhythm.   Pulses:     Intact distal pulses.   Abdominal:      General: Bowel sounds are normal.      Palpations: Abdomen is soft.   Musculoskeletal:      Cervical back: Normal range of motion and neck supple. Skin:     General: Skin is warm and dry.      Findings: No rash.   Neurological:      Mental Status: Alert.          Results Review:   I reviewed the patient's new clinical results.  Lab Results (last 24 hours)     Procedure Component Value Units Date/Time    Protime-INR [[id number]]  (Normal) Collected: 09/01/22 0531    Specimen: Blood Updated: 09/01/22 0620     Protime 20.8 Seconds      INR 2.11          Imaging Results (Last 24 Hours)     ** No results found for the last 24 hours. **          EKG      I personally viewed and interpreted the patient's EKG/Telemetry data:    ECHOCARDIOGRAM:    STRESS MYOVIEW:    CARDIAC CATHETERIZATION:    OTHER:         Assessment & Plan     Principal Problem:    Atrial fibrillation with RVR (HCC)  Active Problems:    Neuropathic pain    Essential hypertension    Stage 2 chronic kidney disease    " Generalized anxiety disorder    Restless legs syndrome    Chronic pain disorder    Hypothyroidism, unspecified    Congestive heart failure (HCC)       Patient presented with atrial fibrillation and her ventricular rate is high and hence she was admitted to the hospital  Patient is ruled out for MI by get enzymes  Patient is already on digoxin diltiazem metoprolol and Eliquis for anticoagulation  Blood pressure heart rate stable  No further cardiac work-up at this time.  Patient had an echocardiogram recently which showed a EF of 45 to 50%    I discussed the patients findings and my recommendations with patient and nurse    Flash Gonzalez MD  09/01/22  17:12 EDT

## 2022-09-01 NOTE — THERAPY EVALUATION
Patient Name: Catalina Moreno  : 1940    MRN: [id number]                              Today's Date: 2022       Admit Date: 2022    Visit Dx:     ICD-10-CM ICD-9-CM   1. Atrial fibrillation with RVR ([facility])  I48.91 427.31   2. Nausea  R11.0 787.02     Patient Active Problem List   Diagnosis   • Arthritis   • Neuropathic pain   • Other long term (current) drug therapy   • Polyarthralgia   • Sacroiliitis ([facility])   • Depressive disorder   • Primary fibromyalgia syndrome   • Gastroesophageal reflux disease   • Essential hypertension   • Lightheadedness   • Hypothyroidism   • Lumbar radiculopathy   • Altered mental status   • Acute on chronic diastolic congestive heart failure ([facility])   • Nonrheumatic tricuspid valve regurgitation   • Atrial fibrillation with RVR ([facility])   • Pulmonary hypertension ([facility])   • Acquired spondylolisthesis of lumbosacral region   • Spondylolisthesis, lumbar region   • Vitamin D deficiency   • Stage 2 chronic kidney disease   • DDD (degenerative disc disease), cervical   • Chronic pain   • Chronic low back pain   • Cervical stenosis of spinal canal   • Anemia   • Polypharmacy   • Age-related cognitive decline   • Edema, unspecified   • Generalized anxiety disorder   • History of falling   • Insomnia, unspecified   • Major depressive disorder, single episode, unspecified   • Need for assistance with personal care   • Other dysphagia   • Polyneuropathy, unspecified   • Unsteadiness on feet   • Restless legs syndrome   • Vomiting, unspecified   • Weakness   • Acute on chronic diastolic (congestive) heart failure ([facility])   • Unspecified abdominal pain   • Paroxysmal atrial fibrillation ([facility])   • Chronic pain disorder   • Other intervertebral disc degeneration, lumbar region   • Essential (primary) hypertension   • Spondylolisthesis, lumbar region   • Hypothyroidism, unspecified   • Gastro-esophageal reflux disease without esophagitis   • Pulmonary hypertension, unspecified ([facility])   • Chronic  diastolic CHF (congestive heart failure) (HCC)   • Congestive heart failure (HCC)   • Acute kidney injury (HCC)     Past Medical History:   Diagnosis Date   • Acute kidney injury (HCC) 7/22/2022   • Anxiety    • Arthritis    • Atrial fibrillation (HCC)     paroxysmal   • Broken shoulder     left-- due to fall 11-7-19 was at [facility]   • Buttock pain     rt side   • DDD (degenerative disc disease), lumbar    • Depression    • Edema     9/2020 foot   • GERD (gastroesophageal reflux disease)    • H/O fall    • Hip pain     rt   • Hypertension    • Hypothyroidism    • Insomnia    • Irregular heart beat    • Leg pain     rt   • Low back pain    • Neuropathy    • Pain in both feet    • PONV (postoperative nausea and vomiting)    • Pulmonary hypertension (HCC)    • Radiculopathy    • Restless legs    • Spondylolisthesis    • Urinary incontinence      Past Surgical History:   Procedure Laterality Date   • BACK SURGERY  07/19/2018    PDC &  PSF L3-L5 insitu   • CARDIAC CATHETERIZATION N/A 4/2/2021    Procedure: Cardiac Catheterization/Vascular Study;  Surgeon: Barrett Evans MD;  Location: [facility] INVASIVE LOCATION;  Service: Cardiovascular;  Laterality: N/A;   • CHOLECYSTECTOMY     • COLONOSCOPY     • HYSTERECTOMY     • ROTATOR CUFF REPAIR Left       General Information     Row Name 09/01/22 1620          Physical Therapy Time and Intention    Document Type evaluation  -CM     Mode of Treatment physical therapy  -CM     Row Name 09/01/22 1622 09/01/22 1620       General Information    Patient Profile Reviewed -- yes  -CM    Prior Level of Function --  reports that she is beginning to get very tired and having a little trouble completing grocery shopping; son has been helping some w/ this; uses rollator wx  -CM independent:;community mobility;gait;driving;shopping;ADL's  reports that she is beginning to get very tired and having a little trouble completing grocery shopping; son has been helping some w/ this   -CM    Existing Precautions/Restrictions -- fall  -CM    Barriers to Rehab -- none identified  -CM    Row Name 09/01/22 1620          Living Environment    People in Home alone;other (see comments)  son stops to check on pt daily; pt expresses fear that she could slip/fall and be injured at home, wants info on life alerts  -CM     Row Name 09/01/22 1620          Home Main Entrance    Number of Stairs, Main Entrance none  -CM     Row Name 09/01/22 1620          Stairs Within Home, Primary    Stair Railings, Within Home, Primary none  -CM     Row Name 09/01/22 1620          Cognition    Orientation Status (Cognition) oriented x 4  -CM     Row Name 09/01/22 1620          Safety Issues, Functional Mobility    Impairments Affecting Function (Mobility) endurance/activity tolerance;strength  -CM           User Key  (r) = Recorded By, (t) = Taken By, (c) = Cosigned By    Initials Name Provider Type    Ora Tony, PT Physical Therapist               Mobility     Row Name 09/01/22 1623          Bed Mobility    Bed Mobility bed mobility (all) activities  -CM     All Activities, [place] (Bed Mobility) independent  -CM     Row Name 09/01/22 1623          Bed-Chair Transfer    Bed-Chair [place] (Transfers) independent  -CM     Row Name 09/01/22 1623          Sit-Stand Transfer    Sit-Stand [place] (Transfers) modified independence  -CM     Assistive Device (Sit-Stand Transfers) walker, front-wheeled  -CM     Row Name 09/01/22 1623          Gait/Stairs (Locomotion)    [place] Level (Gait) supervision  -CM     Assistive Device (Gait) walker, front-wheeled  -CM     Distance in Feet (Gait) 40 ft w/o significant gait deviations.  -CM           User Key  (r) = Recorded By, (t) = Taken By, (c) = Cosigned By    Initials Name Provider Type    Ora Tony, PT Physical Therapist               Obj/Interventions     Row Name 09/01/22 1623          Range of Motion Comprehensive    General Range of Motion  bilateral lower extremity ROM WFL;bilateral upper extremity ROM WFL  -CM     Row Name 09/01/22 1623          Strength Comprehensive (MMT)    General Manual Muscle Testing (MMT) Assessment lower extremity strength deficits identified  -CM     Comment, General Manual Muscle Testing (MMT) Assessment BLEs 4-/5  -CM     Row Name 09/01/22 1623          Balance    Balance Assessment sitting static balance;sitting dynamic balance;standing static balance;standing dynamic balance  -CM     Static Sitting Balance independent  -CM     Dynamic Sitting Balance independent  -CM     Position, Sitting Balance unsupported;sitting edge of bed  -CM     Static Standing Balance modified independence  -CM     Dynamic Standing Balance modified independence  -CM     Position/Device Used, Standing Balance supported;walker, rolling  -CM     Row Name 09/01/22 1623          Sensory Assessment (Somatosensory)    Sensory Assessment (Somatosensory) sensation intact;other (see comments)  sensation present but mildly diminished in B feet  -CM           User Key  (r) = Recorded By, (t) = Taken By, (c) = Cosigned By    Initials Name Provider Type    CM Ora Calderón, PT Physical Therapist               Goals/Plan    No documentation.                Clinical Impression     Row Name 09/01/22 1624          Pain    Pretreatment Pain Rating 0/10 - no pain  -CM     Posttreatment Pain Rating 0/10 - no pain  -CM     Pain Intervention(s) Repositioned;Ambulation/increased activity;Emotional support  -CM     Row Name 09/01/22 1624          Plan of Care Review    Plan of Care Reviewed With patient  -CM     Outcome Evaluation 83 yo female adm 8/30/22 for nausea, a fib w/ rvr. Hx of EF 46-55%, fibromyalgia, a fib, cad. At baseline, pt lives alone in a single level home. Her son stops to check on her daily, but she expresses interest in a life alert system. She ambulates indepndently using a rollator wx. No home O2. Pt does her own driving and grocery shopping.  However, she notes that she gets very tired when doing her own shopping. Her son has been trying to help w/ this. Today, pt presents w/ vital signs which were stable throughout the rx. She has mild weakness in BLEs. Pt is indepndent for bed mobility, coming to stand at rw, trasnferring on/off bsc, and ambulates 40 ft w/ rw and supervision only. Pt does not need IP PT at this time, but she would benefit from HH at d/c to address decreased endurance. Recommend home w/ HH at d/c. Will sign off at this time.  -     Row Name 09/01/22 1629          Therapy Assessment/Plan (PT)    Criteria for Skilled Interventions Met (PT) no;no problems identified which require skilled intervention  -CM     Therapy Frequency (PT) evaluation only  -CM     Predicted Duration of Therapy Intervention (PT) needs HH and life alert info at d/c.  -     Row Name 09/01/22 1629          Vital Signs    O2 Delivery Pre Treatment room air  -CM     O2 Delivery Intra Treatment room air  -CM     O2 Delivery Post Treatment room air  -CM     Recovery Time VSS  -     Row Name 09/01/22 1629          Positioning and Restraints    Pre-Treatment Position in bed  -CM     Post Treatment Position chair  -CM     In Chair notified nsg;sitting;call light within reach;encouraged to call for assist;exit alarm on  -CM           User Key  (r) = Recorded By, (t) = Taken By, (c) = Cosigned By    Initials Name Provider Type    CM Ora Calderón, PT Physical Therapist               Outcome Measures     Row Name 09/01/22 1630 09/01/22 0807       How much help from another person do you currently need...    Turning from your back to your side while in flat bed without using bedrails? 4  -CM 4  -KF    Moving from lying on back to sitting on the side of a flat bed without bedrails? 4  -CM 4  -KF    Moving to and from a bed to a chair (including a wheelchair)? 4  -CM 4  -KF    Standing up from a chair using your arms (e.g., wheelchair, bedside chair)? 4  -CM 3  -KF     Climbing 3-5 steps with a railing? 4  -CM 3  -KF    To walk in hospital room? 4  -CM 3  -KF    AM-PAC 6 Clicks Score (PT) 24  -CM 21  -KF    Highest level of mobility 8 --> Walked 250 feet or more  -CM 6 --> Walked 10 steps or more  -KF          User Key  (r) = Recorded By, (t) = Taken By, (c) = Cosigned By    Initials Name Provider Type    Ora Tony, PT Physical Therapist    Kaylan Jackson, RN Registered Nurse                             Physical Therapy Education                 Title: PT OT SLP Therapies (Done)     Topic: Physical Therapy (Done)     Point: Mobility training (Done)     Learning Progress Summary           Patient Acceptance, E,TB, VU,DU by  at 9/1/2022 1631                               User Key     Initials Effective Dates Name Provider Type Discipline     06/16/21 -  Ora Calderón, PT Physical Therapist PT              PT Recommendation and Plan     Plan of Care Reviewed With: patient  Outcome Evaluation: 81 yo female adm 8/30/22 for nausea, a fib w/ rvr. Hx of EF 46-55%, fibromyalgia, a fib, cad. At baseline, pt lives alone in a single level home. Her son stops to check on her daily, but she expresses interest in a life alert system. She ambulates indepndently using a rollator wx. No home O2. Pt does her own driving and grocery shopping. However, she notes that she gets very tired when doing her own shopping. Her son has been trying to help w/ this. Today, pt presents w/ vital signs which were stable throughout the rx. She has mild weakness in BLEs. Pt is indepndent for bed mobility, coming to stand at rw, trasnferring on/off bsc, and ambulates 40 ft w/ rw and supervision only. Pt does not need IP PT at this time, but she would benefit from HH at d/c to address decreased endurance. Recommend home w/ HH at d/c. Will sign off at this time.     Time Calculation:    PT Charges     Row Name 09/01/22 1631             Time Calculation    Start Time 1030  -CM      Stop Time 1055   -CM      Time Calculation (min) 25 min  -CM      PT Received On 09/01/22  -CM              Time Calculation- PT    Total Timed Code Minutes- PT 0 minute(s)  -CM            User Key  (r) = Recorded By, (t) = Taken By, (c) = Cosigned By    Initials Name Provider Type    Ora Tony, PT Physical Therapist              Therapy Charges for Today     Code Description Service Date Service Provider Modifiers Qty    83830734493 HC PT EVAL LOW COMPLEXITY 4 9/1/2022 Ora Calderón, PT GP 1          PT G-Codes  AM-PAC 6 Clicks Score (PT): 24    Ora Calderón, PT  9/1/2022

## 2022-09-01 NOTE — PLAN OF CARE
Goal Outcome Evaluation:  Plan of Care Reviewed With: patient        Progress: improving  Outcome Evaluation: pt is maintaining a HR in the 90s on 1mg/hr. she has been resting comfortably for the majority of the shift.

## 2022-09-01 NOTE — PLAN OF CARE
Goal Outcome Evaluation:  Plan of Care Reviewed With: patient           Outcome Evaluation: pt resting in bed thorughout shift. Cardizem gtt stopped at 0930. pt HR remains in the 80's. will continue with current plan of care.

## 2022-09-01 NOTE — CASE MANAGEMENT/SOCIAL WORK
Continued Stay Note  [facility]     Patient Name: Catalina Moreno  MRN: [id number]  Today's Date: 9/1/2022    Admit Date: 8/30/2022     Discharge Plan     Row Name 09/01/22 1053       Plan    Plan D/C Plan: Home with [facility] (current, [facility] order placed). Referral to [facility] for CG assistance.    Plan Comments Barrier to D/C: anticipate d/c once cleared by cardiology.                    Expected Discharge Date and Time     Expected Discharge Date Expected Discharge Time    Sep 2, 2022         Phone communication or documentation only - no physical contact with patient or family.    KIMMY SherN, RN    [address] [address]    Office: [phone]  Fax: [phone]

## 2022-09-01 NOTE — PROGRESS NOTES
[facility] Medicine Services Daily Progress Note    Patient Name: Catalina Moreno  : 1940  MRN: [id number]  Primary Care Physician:  Anayeli Costa APRN  Date of admission: 2022      Subjective    Chief Complaint: Fatigue weakness        Patient Reports she still feeling off.    Heart rate seems to be jumping between 80 and 99.  Patient states she is more comfortable leaving tomorrow.  Will monitor overnight.  Walked with therapy today.  States she is a little bit worn out.  Hopefully home in a.m. if atrial fibrillation does not recur.  Will be on Eliquis.  Patient states she cannot get ride late today but should be able to early tomorrow     ROS negative except as above    Objective      Vitals:   Temp:  [97.3 °F (36.3 °C)-98.3 °F (36.8 °C)] 98.3 °F (36.8 °C)  Heart Rate:  [] 81  Resp:  [16-18] 18  BP: ()/(31-71) 120/41  Flow (L/min):  [1] 1  Physical Exam  Vitals reviewed.   HENT:      Head: Normocephalic.      Nose: Nose normal.      Mouth/Throat:      Mouth: Mucous membranes are moist.   Cardiovascular:      Rate and Rhythm: Tachycardia  resolved present. Rhythm irregular but under 100 bpm.      Pulses: Normal pulses.      Heart sounds: Normal heart sounds.   Pulmonary:      Effort: Pulmonary effort is normal.      Breath sounds: Normal breath sounds.   Abdominal:      General: Abdomen is flat.      Palpations: Abdomen is soft.   Musculoskeletal:         General: Normal range of motion.      Cervical back: Normal range of motion.   Skin:     General: Skin is warm.   Neurological:      General: No focal deficit present.      Mental Status: She is alert and oriented to person, place, and time.   Psychiatric:         Mood and Affect: Mood normal.         Behavior: Behavior normal.        Result Review    Result Review:  I have personally reviewed the results from the time of this admission to 2022 17:22 EDT and agree with these findings:  [x]  Laboratory  []   Microbiology  []  Radiology  []  EKG/Telemetry   []  Cardiology/Vascular   []  Pathology  []  Old records  []  Other:  Most notable findings include: INR 2.1 otherwise hemoglobin 9.7         Assessment & Plan    82-year-old female with A. fib with RVR     Active Hospital Problems:          Active Hospital Problems     Diagnosis     • **Atrial fibrillation with RVR (HCC)     • Congestive heart failure (HCC)     • Chronic pain disorder     • Restless legs syndrome     • Generalized anxiety disorder     • Hypothyroidism, unspecified     • Stage 2 chronic kidney disease     • Neuropathic pain     • Essential hypertension        Plan:      Atrial fibrillation with RVR  -EKG reviewed  -Continuous cardiac monitoring  -Continue home digoxin  -Continue Eliquis.  Patient states she will be able to pay 42 hours a month  -Cardiology consulted  -Patient off Cardizem drip now, switched to p.o.     Chronic systolic heart failure  -EF 46 to 50% on 3/5/2022  -Continue home metoprolol     Chronic pain  -Stable  -Continue home Neurontin, Normanna (inspect verified)  -Continue home Lidoderm patch     Depression  -Chronic, stable  -Continue home Zoloft     Hypothyroidism  -TSH ordered  -Continue home levothyroxine       Home tomorrow if no further episodes of atrial fibrillation     DVT prophylaxis:  Patient is on Eliquis     CODE STATUS:    Code Status (Patient has no pulse and is not breathing): CPR (Attempt to Resuscitate)  Medical Interventions (Patient has pulse or is breathing): Full Support     Admission Status:  I believe this patient meets inpatient status.     I discussed the patient's findings and my recommendations with patient.     This patient has been examined wearing appropriate Personal Protective Equipment   09/01/22      Electronically signed by Dom Murray MD, 09/01/22, 17:22 EDT.  [facility] Hospitalist Team

## 2022-09-02 ENCOUNTER — READMISSION MANAGEMENT (OUTPATIENT)
Dept: CALL CENTER | Facility: HOSPITAL | Age: 82
End: 2022-09-02

## 2022-09-02 VITALS
HEART RATE: 80 BPM | WEIGHT: 156.97 LBS | TEMPERATURE: 99.1 F | HEIGHT: 63 IN | DIASTOLIC BLOOD PRESSURE: 59 MMHG | SYSTOLIC BLOOD PRESSURE: 155 MMHG | BODY MASS INDEX: 27.81 KG/M2 | RESPIRATION RATE: 20 BRPM | OXYGEN SATURATION: 96 %

## 2022-09-02 LAB
ALBUMIN SERPL-MCNC: 3.8 G/DL (ref 3.5–5.2)
ALBUMIN/GLOB SERPL: 1.4 G/DL
ALP SERPL-CCNC: 71 U/L (ref 39–117)
ALT SERPL W P-5'-P-CCNC: 12 U/L (ref 1–33)
ANION GAP SERPL CALCULATED.3IONS-SCNC: 6 MMOL/L (ref 5–15)
AST SERPL-CCNC: 31 U/L (ref 1–32)
BASOPHILS # BLD AUTO: 0.1 10*3/MM3 (ref 0–0.2)
BASOPHILS NFR BLD AUTO: 1.3 % (ref 0–1.5)
BILIRUB SERPL-MCNC: 0.7 MG/DL (ref 0–1.2)
BUN SERPL-MCNC: 17 MG/DL (ref 8–23)
BUN/CREAT SERPL: 18.3 (ref 7–25)
CALCIUM SPEC-SCNC: 8.9 MG/DL (ref 8.6–10.5)
CHLORIDE SERPL-SCNC: 107 MMOL/L (ref 98–107)
CO2 SERPL-SCNC: 29 MMOL/L (ref 22–29)
CREAT SERPL-MCNC: 0.93 MG/DL (ref 0.57–1)
DEPRECATED RDW RBC AUTO: 51.6 FL (ref 37–54)
EGFRCR SERPLBLD CKD-EPI 2021: 61.9 ML/MIN/1.73
EOSINOPHIL # BLD AUTO: 0.2 10*3/MM3 (ref 0–0.4)
EOSINOPHIL NFR BLD AUTO: 4 % (ref 0.3–6.2)
ERYTHROCYTE [DISTWIDTH] IN BLOOD BY AUTOMATED COUNT: 17.2 % (ref 12.3–15.4)
GLOBULIN UR ELPH-MCNC: 2.8 GM/DL
GLUCOSE SERPL-MCNC: 94 MG/DL (ref 65–99)
HCT VFR BLD AUTO: 30.9 % (ref 34–46.6)
HGB BLD-MCNC: 9.8 G/DL (ref 12–15.9)
INR PPP: 1.89 (ref 2–3)
LYMPHOCYTES # BLD AUTO: 2 10*3/MM3 (ref 0.7–3.1)
LYMPHOCYTES NFR BLD AUTO: 36.4 % (ref 19.6–45.3)
MAGNESIUM SERPL-MCNC: 2.1 MG/DL (ref 1.6–2.4)
MCH RBC QN AUTO: 26.7 PG (ref 26.6–33)
MCHC RBC AUTO-ENTMCNC: 31.7 G/DL (ref 31.5–35.7)
MCV RBC AUTO: 84.2 FL (ref 79–97)
MONOCYTES # BLD AUTO: 0.6 10*3/MM3 (ref 0.1–0.9)
MONOCYTES NFR BLD AUTO: 11.6 % (ref 5–12)
NEUTROPHILS NFR BLD AUTO: 2.5 10*3/MM3 (ref 1.7–7)
NEUTROPHILS NFR BLD AUTO: 46.7 % (ref 42.7–76)
NRBC BLD AUTO-RTO: 0 /100 WBC (ref 0–0.2)
PHOSPHATE SERPL-MCNC: 3.1 MG/DL (ref 2.5–4.5)
PLATELET # BLD AUTO: 211 10*3/MM3 (ref 140–450)
PMV BLD AUTO: 7.8 FL (ref 6–12)
POTASSIUM SERPL-SCNC: 5.2 MMOL/L (ref 3.5–5.2)
PROT SERPL-MCNC: 6.6 G/DL (ref 6–8.5)
PROTHROMBIN TIME: 18.8 SECONDS (ref 19.4–28.5)
RBC # BLD AUTO: 3.66 10*6/MM3 (ref 3.77–5.28)
SODIUM SERPL-SCNC: 142 MMOL/L (ref 136–145)
WBC NRBC COR # BLD: 5.4 10*3/MM3 (ref 3.4–10.8)

## 2022-09-02 PROCEDURE — 99232 SBSQ HOSP IP/OBS MODERATE 35: CPT | Performed by: INTERNAL MEDICINE

## 2022-09-02 PROCEDURE — 83735 ASSAY OF MAGNESIUM: CPT | Performed by: INTERNAL MEDICINE

## 2022-09-02 PROCEDURE — 84100 ASSAY OF PHOSPHORUS: CPT | Performed by: INTERNAL MEDICINE

## 2022-09-02 PROCEDURE — 85610 PROTHROMBIN TIME: CPT

## 2022-09-02 PROCEDURE — 80053 COMPREHEN METABOLIC PANEL: CPT | Performed by: INTERNAL MEDICINE

## 2022-09-02 PROCEDURE — 99239 HOSP IP/OBS DSCHRG MGMT >30: CPT | Performed by: INTERNAL MEDICINE

## 2022-09-02 PROCEDURE — 85025 COMPLETE CBC W/AUTO DIFF WBC: CPT | Performed by: INTERNAL MEDICINE

## 2022-09-02 RX ORDER — DILTIAZEM HYDROCHLORIDE 120 MG/1
120 CAPSULE, COATED, EXTENDED RELEASE ORAL
Qty: 30 CAPSULE | Refills: 2 | Status: SHIPPED | OUTPATIENT
Start: 2022-09-03 | End: 2023-01-10 | Stop reason: HOSPADM

## 2022-09-02 RX ADMIN — HYDROCODONE BITARTRATE AND ACETAMINOPHEN 1 TABLET: 7.5; 325 TABLET ORAL at 16:02

## 2022-09-02 RX ADMIN — GABAPENTIN 300 MG: 600 TABLET, FILM COATED ORAL at 14:12

## 2022-09-02 RX ADMIN — Medication 10 ML: at 08:28

## 2022-09-02 RX ADMIN — APIXABAN 5 MG: 5 TABLET, FILM COATED ORAL at 08:28

## 2022-09-02 RX ADMIN — METOPROLOL TARTRATE 25 MG: 25 TABLET, FILM COATED ORAL at 08:28

## 2022-09-02 RX ADMIN — LEVOTHYROXINE SODIUM 112 MCG: 0.11 TABLET ORAL at 06:01

## 2022-09-02 RX ADMIN — DILTIAZEM HYDROCHLORIDE 120 MG: 120 CAPSULE, COATED, EXTENDED RELEASE ORAL at 08:28

## 2022-09-02 RX ADMIN — GABAPENTIN 300 MG: 600 TABLET, FILM COATED ORAL at 06:02

## 2022-09-02 RX ADMIN — PANTOPRAZOLE SODIUM 40 MG: 40 TABLET, DELAYED RELEASE ORAL at 11:30

## 2022-09-02 RX ADMIN — DIGOXIN 125 MCG: 125 TABLET ORAL at 11:31

## 2022-09-02 NOTE — CASE MANAGEMENT/SOCIAL WORK
Social Work Assessment  [facility]     Patient Name: Catalina Moreno  MRN: [id number]  Today's Date: 9/2/2022    Admit Date: 8/30/2022     Discharge Plan     Row Name 09/02/22 1543       Plan    Plan DC Plan: Home with [facility] (curent, [facility] order placed). [employer] referral to assist with Medicaid. Eliquis cost $43.50-free coupon eligible.    Patient/Family in Agreement with Plan yes    Plan Comments LSW updated RNCM that patient's secondary [place] is not active. Therefore patient does not have transport benefit as anticipated. Discussed with patient at bedside, that she should call her son about needing a ride home at d/c and confirmd patient had number to do this (patient is A&O x4). Updated RN at bedside that patient was encourated to call to update son. Advised if patient doesn't and/or son is unable, then a cab voucher could be supplied if absolutely needed. Call [facility] ([phone]) to request in the event needed.            Psychosocial     Row Name 09/02/22 7521       Values/Beliefs    Spiritual, Cultural Beliefs, Zoroastrianism Practices, Values that Affect Care no       Behavior WDL    Behavior WDL WDL       Coping/Stress    Major Change/Loss/Stressor financial;medical condition/diagnosis    Patient Personal Strengths expressive of needs;able to adapt    Sources of Support adult child(evangelista)    Techniques to [place] with Loss/Stress/Change other (see comments)  Pet    Reaction to Health Status adjusting    Understanding of Condition and Treatment adequate understanding of medical condition;adequate understanding of treatment    Coping/Stress Comments Patient had not been taking her medication as prescribed prior to hospital admission related to finances. This was discussed and expenses reviewed. Patient reports she can afford them. Discussed her support system includes her son. Denies any other peterson changes/needs prior to hospitalization.       Developmental Stage (Harsha's)    Developmental Stage Stage 8 (66  years-death/Late Adulthood) Integrity vs. Despair       C-SSRS (Recent)    Q1 Wished to be Dead (Past Month) no    Q2 Suicidal Thoughts (Past Month) no    Q6 Suicide Behavior (Lifetime) no       Violence Risk    Feels Like Hurting Others no    Previous Attempt to Harm Others no            Abuse/Neglect     Row Name 09/02/22 1541       Personal Safety    Feels Unsafe at Home or Work/School no    Feels Threatened by Someone no    Does Anyone Try to Keep You From Having Contact with Others or Doing Things Outside Your Home? no    Physical Signs of Abuse Present no            Legal     Row Name 09/02/22 1541       Financial/Legal    Source of Income social security    Who Manages Finances if Patient Unable Son    Finance Comments Reports financial concerns at time that can disrupt her monthly budget.       Legal    Criminal Activity/Legal Involvement none            Substance Abuse     Row Name 09/02/22 1542       Substance Use    Substance Use Status never used    Substance Use Comment Denies all use.       AUDIT-C (Alcohol Use Disorders ID Test)    Q1: How often do you have a drink containing alcohol? Never    Q2: How many drinks containing alcohol do you have on a typical day when you are drinking? None    Q3: How often do you have six or more drinks on one occasion? Never    Audit-C Score 0       Family Member Substance Use (#4)    Family History of Substance Use none           Met with patient in room wearing PPE: mask.  Maintained distance greater than six feet and spent less than 15 minutes in the room.    KATY Jensen    Phone: [phone]  Cell: [phone]  Fax: [phone]  [email or web address]

## 2022-09-02 NOTE — OUTREACH NOTE
Prep Survey    Flowsheet Row Responses   [facility] patient discharged from? [facility]   Is LACE score < 7 ? No   Emergency Room discharge w/ pulse ox? No   Eligibility [facility]   Date of Admission 08/30/22   Date of Discharge 09/02/22   Discharge Disposition Home-Health Care Sv   Discharge diagnosis Afuib w/ rvr and chf   Does the patient have one of the following disease processes/diagnoses(primary or secondary)? CHF   Does the patient have Home health ordered? Yes   What is the Home health agency?  [facility]    Is there a DME ordered? No   Prep survey completed? Yes          MARIAN THOMPSON - Registered Nurse

## 2022-09-02 NOTE — PLAN OF CARE
Goal Outcome Evaluation:           Progress: improving  Outcome Evaluation: pt up w assist to bsc. States pain management has been effective. on room air

## 2022-09-02 NOTE — NURSING NOTE
Physical therapy said the pt just got evaluated yesterday and recommended home with home health. No need to re evaluate pt today because of no significant change.   Dr. Murray aware.

## 2022-09-02 NOTE — CASE MANAGEMENT/SOCIAL WORK
Discharge Planning Assessment  [facility]     Patient Name: Catalina Moreno  MRN: [id number]  Today's Date: 9/2/2022    Admit Date: 8/30/2022           Discharge Plan     Row Name 09/02/22 1518       Plan    Plan D/C Plan: Home with [facility] (current, [facility] order placed). Referral to [facility] for CG assistance pending active Medicaid. Eliquis cost $43.50-free coupon eligible.    Patient/Family in Agreement with Plan yes    Plan Comments  spoke to patient at bedside wearing mask and keeping distance greater than 6 feet and spent less than 15 minutes in room. IMM letter reviewed with patient, verbal consent obtained and copy left at bedside. Patient concerned about d/c home. CM discussed with RN and SW. Patient to try to call son at d/c for transport. CM verified with SW that Medicaid plan is no longer active and patient does not have Medicaid transport options at this time. CM updated RN and ER CM. Anticipate d/c today once cleared by cardiology.                   Expected Discharge Date and Time     Expected Discharge Date Expected Discharge Time    Sep 2, 2022              Patient Forms     Row Name 09/02/22 1525       Patient Forms    Important Message from Medicare (IMM) Delivered  9/2/22    Delivered to Patient    Method of delivery In person  reviewed with patient, verbal consent obtained and copy left at bedside              Met with patient in room wearing PPE: mask.     Maintained distance greater than six feet and spent less than 15 minutes in the room.      CASTRO Sher, RN    [address] [address]    Office: [phone]  Fax: [phone]

## 2022-09-02 NOTE — PROGRESS NOTES
Referring Provider: Hospitalist    Reason for follow-up: Palpitations     Patient Care Team:  Anayeli Costa APRN as PCP - General (Family Medicine)  Barrett Evans MD as Consulting Physician (Cardiology)    Subjective .  Patient is feeling better with less palpitations.  She however says she is fatigued and tired    Objective  Lying in bed comfortably     Review of Systems   Constitutional: Negative for fever and malaise/fatigue.   Cardiovascular: Positive for palpitations. Negative for chest pain and dyspnea on exertion.   Respiratory: Negative for cough and shortness of breath.    Skin: Negative for rash.   Gastrointestinal: Negative for abdominal pain, nausea and vomiting.   Neurological: Negative for focal weakness and headaches.   All other systems reviewed and are negative.      Baclofen, Codeine, Ibuprofen, Methocarbamol, Naproxen, Tizanidine hcl, and Tolmetin    Scheduled Meds:apixaban, 5 mg, Oral, Q12H  atorvastatin, 10 mg, Oral, Nightly  digoxin, 125 mcg, Oral, Daily  dilTIAZem CD, 120 mg, Oral, Q24H  gabapentin, 300 mg, Oral, Q8H  levothyroxine, 112 mcg, Oral, Q AM  lidocaine, 2 patch, Transdermal, Q24H  metoprolol tartrate, 25 mg, Oral, Q12H  pantoprazole, 40 mg, Oral, Daily With Lunch  rOPINIRole, 0.25 mg, Oral, Nightly  sertraline, 50 mg, Oral, Nightly  sodium chloride, 10 mL, Intravenous, Q12H      Continuous Infusions:dilTIAZem, 5-15 mg/hr, Last Rate: Stopped (09/01/22 0912)      PRN Meds:.•  acetaminophen **OR** acetaminophen **OR** acetaminophen  •  aluminum-magnesium hydroxide-simethicone  •  HYDROcodone-acetaminophen  •  magnesium sulfate **OR** magnesium sulfate **OR** magnesium sulfate  •  melatonin  •  nitroglycerin  •  ondansetron **OR** ondansetron  •  potassium & sodium phosphates **OR** potassium & sodium phosphates  •  potassium chloride  •  potassium chloride  •  [COMPLETED] Insert peripheral IV **AND** sodium chloride  •  sodium chloride        VITAL SIGNS  Vitals:     "09/02/22 0500 09/02/22 0515 09/02/22 0828 09/02/22 1020   BP:  101/70 142/89 129/53   BP Location:  Left arm     Patient Position:  Lying     Pulse:  77 97 79   Resp:  16  18   Temp:  98.5 °F (36.9 °C)  98.6 °F (37 °C)   TempSrc:  Oral  Oral   SpO2:  94%  95%   Weight: 71.2 kg (156 lb 15.5 oz)      Height:           Flowsheet Rows    Flowsheet Row First Filed Value   Admission Height 160 cm (63\") Documented at 08/30/2022 1431   Admission Weight 68 kg (150 lb) Documented at 08/30/2022 1431           TELEMETRY: Atrial fibrillation    Physical Exam:  Constitutional:       Appearance: Well-developed.   Eyes:      General: No scleral icterus.     Conjunctiva/sclera: Conjunctivae normal.   HENT:      Head: Normocephalic and atraumatic.   Neck:      Vascular: No carotid bruit or JVD.   Pulmonary:      Effort: Pulmonary effort is normal.      Breath sounds: Normal breath sounds. No wheezing. No rales.   Cardiovascular:      Normal rate. Irregularly irregular rhythm.   Pulses:     Intact distal pulses.   Abdominal:      General: Bowel sounds are normal.      Palpations: Abdomen is soft.   Musculoskeletal:      Cervical back: Normal range of motion and neck supple. Skin:     General: Skin is warm and dry.      Findings: No rash.   Neurological:      Mental Status: Alert.          Results Review:   I reviewed the patient's new clinical results.  Lab Results (last 24 hours)     Procedure Component Value Units Date/Time    Comprehensive Metabolic Panel [[id number]] Collected: 09/02/22 0441    Specimen: Blood Updated: 09/02/22 0522     Glucose 94 mg/dL      BUN 17 mg/dL      Creatinine 0.93 mg/dL      Sodium 142 mmol/L      Potassium 5.2 mmol/L      Chloride 107 mmol/L      CO2 29.0 mmol/L      Calcium 8.9 mg/dL      Total Protein 6.6 g/dL      Albumin 3.80 g/dL      ALT (SGPT) 12 U/L      AST (SGOT) 31 U/L      Alkaline Phosphatase 71 U/L      Total Bilirubin 0.7 mg/dL      Globulin 2.8 gm/dL      A/G Ratio 1.4 g/dL      " BUN/Creatinine Ratio 18.3     Anion Gap 6.0 mmol/L      eGFR 61.9 mL/min/1.73      Comment: National Kidney Foundation and American Society of Nephrology (ASN) Task Force recommended calculation based on the Chronic Kidney Disease Epidemiology Collaboration (CKD-EPI) equation refit without adjustment for race.       Narrative:      GFR Normal >60  Chronic Kidney Disease <60  Kidney Failure <15      Phosphorus [[id number]]  (Normal) Collected: 09/02/22 0441    Specimen: Blood Updated: 09/02/22 0520     Phosphorus 3.1 mg/dL     Magnesium [[id number]]  (Normal) Collected: 09/02/22 0441    Specimen: Blood Updated: 09/02/22 0520     Magnesium 2.1 mg/dL     Protime-INR [[id number]]  (Abnormal) Collected: 09/02/22 0441    Specimen: Blood Updated: 09/02/22 0508     Protime 18.8 Seconds      INR 1.89    CBC & Differential [[id number]]  (Abnormal) Collected: 09/02/22 0441    Specimen: Blood Updated: 09/02/22 0503    Narrative:      The following orders were created for panel order CBC & Differential.  Procedure                               Abnormality         Status                     ---------                               -----------         ------                     CBC Auto Differential[[id number]]        Abnormal            Final result                 Please view results for these tests on the individual orders.    CBC Auto Differential [[id number]]  (Abnormal) Collected: 09/02/22 0441    Specimen: Blood Updated: 09/02/22 0503     WBC 5.40 10*3/mm3      RBC 3.66 10*6/mm3      Hemoglobin 9.8 g/dL      Hematocrit 30.9 %      MCV 84.2 fL      MCH 26.7 pg      MCHC 31.7 g/dL      RDW 17.2 %      RDW-SD 51.6 fl      MPV 7.8 fL      Platelets 211 10*3/mm3      Neutrophil % 46.7 %      Lymphocyte % 36.4 %      Monocyte % 11.6 %      Eosinophil % 4.0 %      Basophil % 1.3 %      Neutrophils, Absolute 2.50 10*3/mm3      Lymphocytes, Absolute 2.00 10*3/mm3      Monocytes, Absolute 0.60 10*3/mm3      Eosinophils, Absolute 0.20  10*3/mm3      Basophils, Absolute 0.10 10*3/mm3      nRBC 0.0 /100 WBC           Imaging Results (Last 24 Hours)     ** No results found for the last 24 hours. **          EKG      I personally viewed and interpreted the patient's EKG/Telemetry data:    ECHOCARDIOGRAM:    STRESS MYOVIEW:    CARDIAC CATHETERIZATION:    OTHER:         Assessment & Plan     Principal Problem:    Atrial fibrillation with RVR ([facility])  Active Problems:    Neuropathic pain    Essential hypertension    Stage 2 chronic kidney disease    Generalized anxiety disorder    Restless legs syndrome    Chronic pain disorder    Hypothyroidism, unspecified    Congestive heart failure (HCC)       Patient presented with atrial fibrillation and her ventricular rate is high and hence she was admitted to the hospital  Patient is ruled out for MI by get enzymes  Patient is already on digoxin diltiazem metoprolol and Eliquis for anticoagulation  Blood pressure heart rate stable  No further cardiac work-up at this time.  Patient had an echocardiogram recently which showed a EF of 45 to 50%  If her blood pressure permits in future we will start her on low-dose ACE inhibitor's  Patient needs ambulation and needs physical therapy evaluation.    I discussed the patients findings and my recommendations with patient and nurse    Flash Gonzalez MD  09/02/22  12:50 EDT

## 2022-09-02 NOTE — DISCHARGE SUMMARY
[facility] Medicine Services  DISCHARGE SUMMARY    Patient Name: Catalina Moreno  : 1940  MRN: [id number]    Discharge condition: Stabilized  Date of Admission: 2022  Discharge Diagnosis: A. fib with RVR, noncompliance  Date of Discharge: 2022  Primary Care Physician: Anayeli Costa APRN      Presenting Problem:   Nausea [R11.0]  Atrial fibrillation with RVR (HCC) [I48.91]    Active and Resolved Hospital Problems:  Active Hospital Problems    Diagnosis POA   • **Atrial fibrillation with RVR (HCC) [I48.91] Yes   • Congestive heart failure (HCC) [I50.9] Yes   • Chronic pain disorder [G89.4] Yes   • Restless legs syndrome [G25.81] Yes   • Generalized anxiety disorder [F41.1] Yes   • Hypothyroidism, unspecified [E03.9] Yes   • Stage 2 chronic kidney disease [N18.2] Yes   • Neuropathic pain [M79.2] Yes   • Essential hypertension [I10] Yes      Resolved Hospital Problems   No resolved problems to display.         Hospital Course     Hospital Course:  Catalina Moreno is a 82 y.o. female who presented to the hospital with generalized weakness and was found to be in recurrent A. fib with RVR.  Patient was supposed to be on Coumadin but seems to be noncompliant with her medicines.  The patient was admitted to the hospitalist service and her cardiologist was consulted.  She was maintained on Cardizem drip and eventually switched over to p.o. rate control.  She did not want to do Coumadin and hence the patient was started on Eliquis.  Once the social arrangements were worked out with Eliquis the patient was checked by PT and OT and once cleared she was discharged home after being cleared by cardiology in stable condition with stable vitals with Eliquis and p.o. rate control prescription.  Patient was instructed to allow home health to come to her house.  She was also instructed to follow-up with her cardiologist.              Reasons For Change In Medications and  Indications for New Medications:      Day of Discharge     Vital Signs:  Temp:  [98.3 °F (36.8 °C)-98.6 °F (37 °C)] 98.6 °F (37 °C)  Heart Rate:  [] 79  Resp:  [16-20] 18  BP: (101-142)/(41-89) 129/53    Physical Exam:  Physical Exam  Vitals reviewed.   HENT:      Head: Normocephalic.      Nose: Nose normal.      Mouth/Throat:      Mouth: Mucous membranes are moist.   Cardiovascular:      Rate and Rhythm: Normal rate and regular rhythm.      Pulses: Normal pulses.      Heart sounds: Normal heart sounds.   Pulmonary:      Effort: Pulmonary effort is normal.      Breath sounds: Normal breath sounds.   Abdominal:      General: Abdomen is flat.      Palpations: Abdomen is soft.   Musculoskeletal:         General: Normal range of motion.      Cervical back: Normal range of motion.   Skin:     General: Skin is warm.   Neurological:      General: No focal deficit present.      Mental Status: She is alert and oriented to person, place, and time.   Psychiatric:         Mood and Affect: Mood normal.         Behavior: Behavior normal.            Pertinent  and/or Most Recent Results     LAB RESULTS:      Lab 09/02/22 0441 09/01/22  0531 08/31/22 0412 08/30/22  1534 08/30/22  1533   WBC 5.40  --  7.00  --  10.60   HEMOGLOBIN 9.8*  --  9.7*  --  11.3*   HEMATOCRIT 30.9*  --  30.6*  --  35.1   PLATELETS 211  --  195  --  218   NEUTROS ABS 2.50  --  3.20  --  7.80*   LYMPHS ABS 2.00  --  2.80  --  1.90   MONOS ABS 0.60  --  0.80  --  0.90   EOS ABS 0.20  --  0.20  --  0.00   MCV 84.2  --  83.5  --  82.1   LACTATE  --   --   --  1.5  --    PROTIME 18.8* 20.8 13.1*  --  13.0*         Lab 09/02/22 0441 08/31/22 0412 08/30/22  1533   SODIUM 142 142 144   POTASSIUM 5.2 3.6 3.4*   CHLORIDE 107 107 104   CO2 29.0 21.0* 24.0   ANION GAP 6.0 14.0 16.0*   BUN 17 18 16   CREATININE 0.93 0.88 0.91   EGFR 61.9 66.1 63.5   GLUCOSE 94 97 113*   CALCIUM 8.9 8.4* 9.2   MAGNESIUM 2.1 2.0 2.0   PHOSPHORUS 3.1 2.6  --    TSH  --  12.680*   --          Lab 09/02/22  0441 08/30/22  1533   TOTAL PROTEIN 6.6 8.2   ALBUMIN 3.80 4.80   GLOBULIN 2.8 3.4   ALT (SGPT) 12 9   AST (SGOT) 31 21   BILIRUBIN 0.7 2.9*   ALK PHOS 71 74   LIPASE  --  11*         Lab 09/02/22  0441 09/01/22  0531 08/31/22  0412 08/30/22  1533   TROPONIN T  --   --   --  <0.010   PROTIME 18.8* 20.8 13.1* 13.0*   INR 1.89* 2.11 1.29* 1.28*                 Brief Urine Lab Results  (Last result in the past 365 days)      Color   Clarity   Blood   Leuk Est   Nitrite   Protein   CREAT   Urine HCG        08/30/22 2143 Dark Yellow   Clear   Negative   Negative   Negative   30 mg/dL (1+)               Microbiology Results (last 10 days)     Procedure Component Value - Date/Time    COVID PRE-OP / PRE-PROCEDURE SCREENING ORDER (NO ISOLATION) - Swab, Nasopharynx [[id number]]  (Normal) Collected: 08/30/22 1540    Lab Status: Final result Specimen: Swab from Nasopharynx Updated: 08/30/22 1611    Narrative:      The following orders were created for panel order COVID PRE-OP / PRE-PROCEDURE SCREENING ORDER (NO ISOLATION) - Swab, Nasopharynx.  Procedure                               Abnormality         Status                     ---------                               -----------         ------                     COVID-19,CEPHEID/CARLI,CO...[[id number]]  Normal              Final result                 Please view results for these tests on the individual orders.    COVID-19,CEPHEID/CARLI,COR/KEVEN/PAD/ISAÍAS IN-HOUSE(OR EMERGENT/ADD-ON),NP SWAB IN TRANSPORT MEDIA 3-4 HR TAT, RT-PCR - Swab, Nasopharynx [[id number]]  (Normal) Collected: 08/30/22 1540    Lab Status: Final result Specimen: Swab from Nasopharynx Updated: 08/30/22 1611     COVID19 Not Detected    Narrative:      Fact sheet for providers: https://www.fda.gov/media/784578/download     Fact sheet for patients: https://www.fda.gov/media/229514/download  Fact sheet for providers: https://www.fda.gov/media/672702/download    Fact sheet for patients:  https://www.fda.gov/media/013638/download    Test performed by PCR.               Results for orders placed during the hospital encounter of 03/04/22    Duplex Venous Lower Extremity - Bilateral CAR    Interpretation Summary  · Normal bilateral lower extremity venous duplex scan.  · Nonvascular cystic mass located in the left posterior mid calf.      Results for orders placed during the hospital encounter of 03/04/22    Duplex Venous Lower Extremity - Bilateral CAR    Interpretation Summary  · Normal bilateral lower extremity venous duplex scan.  · Nonvascular cystic mass located in the left posterior mid calf.      Results for orders placed during the hospital encounter of 03/04/22    Adult Transthoracic Echo Complete W/ Cont if Necessary Per Protocol    Interpretation Summary  · Estimated right ventricular systolic pressure from tricuspid regurgitation is mildly elevated (35-45 mmHg).  · Mild pulmonary hypertension is present.  · Left atrial volume is severely increased.  · The right atrial cavity is severely dilated.  · Abnormal mitral valve structure consistent with dilated annulus.  · Estimated left ventricular EF = 45% Left ventricular ejection fraction appears to be 46 - 50%. Left ventricular systolic function is mildly decreased.  · Left ventricular diastolic function is consistent with (grade II w/high LAP) pseudonormalization.  · The right ventricular cavity is borderline dilated.      Labs Pending at Discharge:      Procedures Performed           Consults:   Consults     Date and Time Order Name Status Description    8/31/2022 12:34 AM Inpatient Cardiology Consult      8/30/2022  7:49 PM Hospitalist (on-call MD unless specified)              Discharge Details        Discharge Medications      New Medications      Instructions Start Date   apixaban 5 MG tablet tablet  Commonly known as: ELIQUIS   5 mg, Oral, Every 12 Hours Scheduled      dilTIAZem  MG 24 hr capsule  Commonly known as: CARDIZEM CD    120 mg, Oral, Every 24 Hours Scheduled   Start Date: September 3, 2022        Continue These Medications      Instructions Start Date   atorvastatin 10 MG tablet  Commonly known as: LIPITOR   10 mg, Oral, Nightly      cyanocobalamin 1000 MCG tablet  Commonly known as: VITAMIN B-12   1,000 mcg, Oral, Daily      digoxin 125 MCG tablet  Commonly known as: LANOXIN   125 mcg, Oral, Daily Digoxin      EXCEDRIN PO   2 tablets, Oral, Every 6 Hours PRN      famotidine 20 MG tablet  Commonly known as: PEPCID   20 mg, Oral, Daily      gabapentin 600 MG tablet  Commonly known as: NEURONTIN   600 mg, Oral, 4 Times Daily      HYDROcodone-acetaminophen 7.5-325 MG per tablet  Commonly known as: NORCO   1 tablet, Oral, 4 Times Daily PRN      levothyroxine 88 MCG tablet  Commonly known as: SYNTHROID, LEVOTHROID   88 mcg, Oral, Daily      lidocaine 5 %  Commonly known as: LIDODERM   UNWRAP AND APPLY 2 PATCH(ES) ON THE SKIN DAILY AS DIRECTED BY PROVIDER. REMOVE AND DISCARD PATCH(ES) WITHIN 12 HOURS OR AS DIRECTED BY MD      loperamide 2 MG tablet  Commonly known as: IMODIUM A-D   2 mg, Oral, As Needed      metoprolol tartrate 25 MG tablet  Commonly known as: LOPRESSOR   25 mg, Oral, Every 12 Hours Scheduled      pantoprazole 40 MG EC tablet  Commonly known as: PROTONIX   40 mg, Oral, Daily With Lunch, Afternoons      rOPINIRole 0.25 MG tablet  Commonly known as: REQUIP   0.25 mg, Oral, Nightly, Take 1 hour before bedtime      sertraline 50 MG tablet  Commonly known as: ZOLOFT   50 mg, Oral, Nightly         Stop These Medications    amiodarone 200 MG tablet  Commonly known as: PACERONE     warfarin 5 MG tablet  Commonly known as: COUMADIN            Allergies   Allergen Reactions   • Baclofen Rash   • Codeine Nausea Only   • Ibuprofen Unknown (See Comments)     Patient doesn't know----Motin   • Methocarbamol Unknown (See Comments)     Patient doesn't know   • Naproxen Unknown (See Comments)     Pt. Doesn't know    • Tizanidine Hcl Other  (See Comments)     Syncope    • Tolmetin Dizziness     Pt. Doesn't know-- same as Tolectin         Discharge Disposition:   Home or Self Care    Diet:  Hospital:  Diet Order   Procedures   • Diet Cardiac; Healthy Heart         Discharge Activity:         CODE STATUS:  Code Status and Medical Interventions:   Ordered at: 08/30/22 2124     Code Status (Patient has no pulse and is not breathing):    CPR (Attempt to Resuscitate)     Medical Interventions (Patient has pulse or is breathing):    Full Support         Future Appointments   Date Time Provider Department Center   9/20/2022  2:40 PM Shefali Worthy MD [facility]       Additional Instructions for the Follow-ups that You Need to Schedule     Ambulatory Referral to Home Health (Hospital)   As directed      Face to Face Visit Date: 8/31/2022    Follow-up provider for Plan of Care?: I treated the patient in an acute care facility and will not continue treatment after discharge.    Follow-up provider: CATALINO KING [[id number]]    Reason/Clinical Findings: resume of care skilled nursing and PT    Describe mobility limitations that make leaving home difficult: resumption of services please    Nursing/Therapeutic Services Requested: Skilled Nursing Physical Therapy    Skilled nursing orders: Medication education    Frequency: 1 Week 1               Time spent on Discharge including face to face service:  45 minutes    This patient has been examined wearing appropriate Personal Protective Equipment and discussed with Patient. 09/02/22      Signature: Dom Murray MD

## 2022-09-03 NOTE — HOME HEALTH
PATIENT IS CURRENT WITH [facility] HOME [facility].  ADMITTED TO [facility] ON 8/30/22.  WE WILL CONTINUE TO FOLLOW AND RESUME CARE ONCE DISCHARGED HOME.

## 2022-09-04 ENCOUNTER — HOME CARE VISIT (OUTPATIENT)
Dept: HOME HEALTH SERVICES | Facility: HOME HEALTHCARE | Age: 82
End: 2022-09-04

## 2022-09-04 PROCEDURE — G0299 HHS/HOSPICE OF RN EA 15 MIN: HCPCS

## 2022-09-05 ENCOUNTER — TRANSITIONAL CARE MANAGEMENT TELEPHONE ENCOUNTER (OUTPATIENT)
Dept: CALL CENTER | Facility: HOSPITAL | Age: 82
End: 2022-09-05

## 2022-09-05 ENCOUNTER — HOME CARE VISIT (OUTPATIENT)
Dept: HOME HEALTH SERVICES | Facility: HOME HEALTHCARE | Age: 82
End: 2022-09-05

## 2022-09-05 VITALS
TEMPERATURE: 97.9 F | RESPIRATION RATE: 18 BRPM | HEART RATE: 98 BPM | DIASTOLIC BLOOD PRESSURE: 69 MMHG | OXYGEN SATURATION: 96 % | SYSTOLIC BLOOD PRESSURE: 131 MMHG

## 2022-09-05 NOTE — OUTREACH NOTE
Call Center TCM Note    Flowsheet Row Responses   [facility] facility patient discharged from? [facility]   Does the patient have one of the following disease processes/diagnoses(primary or secondary)? CHF   TCM attempt successful? No   Unsuccessful attempts Attempt 2           Bibiana Hinojosa LPN    9/5/2022, 10:07 EDT

## 2022-09-05 NOTE — OUTREACH NOTE
Call Center TCM Note    Flowsheet Row Responses   [facility] facility patient discharged from? [facility]   Does the patient have one of the following disease processes/diagnoses(primary or secondary)? CHF   TCM attempt successful? No   Unsuccessful attempts Attempt 1           Bibiana Hinojosa LPN    9/5/2022, 08:18 EDT

## 2022-09-05 NOTE — HOME HEALTH
Patient is a HUGH today on 9/04 after being in the hospital for elevated BP and A-fib. Patient states that she is feeling better today. States that she is weak from her baseline and agrees to PT. Patient lives alone and has a friend that checks in on her. Patient was taken off of coumadin and put back on eliquis. No INR due today. Plan to teach safety/falls prevention, medication education, labs as ordered, A-fib monitoring/education, HTN monitoring, rehospitalization prevention.    Primary focus of care: A-fib

## 2022-09-06 ENCOUNTER — TRANSITIONAL CARE MANAGEMENT TELEPHONE ENCOUNTER (OUTPATIENT)
Dept: CALL CENTER | Facility: HOSPITAL | Age: 82
End: 2022-09-06

## 2022-09-06 ENCOUNTER — HOME CARE VISIT (OUTPATIENT)
Dept: HOME HEALTH SERVICES | Facility: HOME HEALTHCARE | Age: 82
End: 2022-09-06

## 2022-09-06 VITALS
TEMPERATURE: 97 F | DIASTOLIC BLOOD PRESSURE: 78 MMHG | RESPIRATION RATE: 18 BRPM | OXYGEN SATURATION: 97 % | HEART RATE: 80 BPM | SYSTOLIC BLOOD PRESSURE: 140 MMHG

## 2022-09-06 PROCEDURE — G0299 HHS/HOSPICE OF RN EA 15 MIN: HCPCS

## 2022-09-06 NOTE — CASE MANAGEMENT/SOCIAL WORK
Case Management Discharge Note      Final Note: [facility]        Home Medical Care Coordination complete.    Service Provider Selected Services Address Phone Fax Patient Preferred    [facility]  Home Nursing ,  Home Rehabilitation [address] [facility] [place] [address] [phone] --              Transportation Services  Private: Car    Final Discharge Disposition Code: 06 - home with home health care

## 2022-09-06 NOTE — HOME HEALTH
pt. states she is still very weak, tired.  Heart-rate is irregular, pt. states dizziness.  SN awaiting call back from Dr. Gonzalez's office for follow up appt.     *appt. with Dr. Gonzalez made by this SN for 9/8/22 pt. aware        Next SN Visit: CP assess, falls assess, pain assess, weakness assess, follow up Dr. Bravo appt.

## 2022-09-06 NOTE — OUTREACH NOTE
Call Center TCM Note    Flowsheet Row Responses   [facility] patient discharged from? [facility]   Does the patient have one of the following disease processes/diagnoses(primary or secondary)? CHF   TCM attempt successful? Yes   Call start time 0835   Call end time 0845   General alerts for this patient CM notes indicate that sonKarri indicated that he requests NO calls unless emergency   Discharge diagnosis Afib w/ rvr,  chf   Meds reviewed with patient/caregiver? Yes   Is the patient having any side effects they believe may be caused by any medication additions or changes? No   Does the patient have all medications ordered at discharge? Yes   Is the patient taking all medications as directed (includes completed medication regime)? Yes   Does the patient have an appointment with their PCP within 7 days of discharge? No   Nursing Interventions Assisted patient with making appointment per protocol, Routed TCM call to PCP office, PCP office requested to make appointment - message sent   What is the Home health agency?  [facility]    Has home health visited the patient within 72 hours of discharge? Yes   Pulse Ox monitoring None   Psychosocial issues? No   Did the patient receive a copy of their discharge instructions? Yes   Nursing interventions Reviewed instructions with patient   What is the patient's perception of their health status since discharge? Improving  [Pt reports she's nauseous and has stomach pain which was happening prior to hospital admission. ]   Is the patient able to teach back signs and symptoms of worsening condition? (i.e. weight gain, shortness of air, etc.) No   Notified Case Management Education issues   TCM call completed? Yes           Liz Garcia RN    9/6/2022, 08:50 EDT

## 2022-09-07 ENCOUNTER — HOME CARE VISIT (OUTPATIENT)
Dept: HOME HEALTH SERVICES | Facility: HOME HEALTHCARE | Age: 82
End: 2022-09-07

## 2022-09-07 VITALS
OXYGEN SATURATION: 98 % | TEMPERATURE: 97.6 F | DIASTOLIC BLOOD PRESSURE: 70 MMHG | SYSTOLIC BLOOD PRESSURE: 150 MMHG | HEART RATE: 86 BPM

## 2022-09-07 PROCEDURE — G0151 HHCP-SERV OF PT,EA 15 MIN: HCPCS

## 2022-09-07 NOTE — HOME HEALTH
Pt referred for home health PT due to recent hospitalization for afib.    PLOF I at home. Pt states on/off use of rollator for several years.     Environment Lives alone in a single story house with no steps to get in and out thru garage. House is clutter free. Family assists with patient needs.     Pt complaining of LE weakness. Pt states gets fatigued with short distance ambulation in house even with use of rollator for support. Pt wants to get LE stronger. Pt is I with bed mobility and transfers. Pt requires supervision to ambulate x 150 ft with use of rollator in house due to reports of LE fatigue. Scored 23/28 on Tinetti.     Plan for next visit progress HEP, balance and gait training.

## 2022-09-08 ENCOUNTER — HOME CARE VISIT (OUTPATIENT)
Dept: HOME HEALTH SERVICES | Facility: HOME HEALTHCARE | Age: 82
End: 2022-09-08

## 2022-09-08 VITALS
DIASTOLIC BLOOD PRESSURE: 76 MMHG | TEMPERATURE: 98.8 F | HEART RATE: 73 BPM | SYSTOLIC BLOOD PRESSURE: 140 MMHG | OXYGEN SATURATION: 95 % | RESPIRATION RATE: 17 BRPM

## 2022-09-08 PROCEDURE — G0299 HHS/HOSPICE OF RN EA 15 MIN: HCPCS

## 2022-09-12 ENCOUNTER — HOME CARE VISIT (OUTPATIENT)
Dept: HOME HEALTH SERVICES | Facility: HOME HEALTHCARE | Age: 82
End: 2022-09-12

## 2022-09-13 ENCOUNTER — HOME CARE VISIT (OUTPATIENT)
Dept: HOME HEALTH SERVICES | Facility: HOME HEALTHCARE | Age: 82
End: 2022-09-13

## 2022-09-13 ENCOUNTER — READMISSION MANAGEMENT (OUTPATIENT)
Dept: CALL CENTER | Facility: HOSPITAL | Age: 82
End: 2022-09-13

## 2022-09-13 VITALS
HEART RATE: 100 BPM | DIASTOLIC BLOOD PRESSURE: 70 MMHG | SYSTOLIC BLOOD PRESSURE: 150 MMHG | RESPIRATION RATE: 20 BRPM | OXYGEN SATURATION: 98 % | TEMPERATURE: 97.9 F

## 2022-09-13 PROCEDURE — G0299 HHS/HOSPICE OF RN EA 15 MIN: HCPCS

## 2022-09-13 NOTE — OUTREACH NOTE
CHF Week 2 Survey    Flowsheet Row Responses   [facility] facility patient discharged from? [facility]   Does the patient have one of the following disease processes/diagnoses(primary or secondary)? CHF   Week 2 attempt successful? No   Unsuccessful attempts Attempt 1          LEA oCronado Registered Nurse

## 2022-09-13 NOTE — HOME HEALTH
Routine Visit Note: Met SN at door gait slow steady. Reports feeling some better last timew seen by this nurse. Denies shortness of breath reports can feel heart racing at times today more regular. Medication reconciliation completed no Digoxin in home inst will call Dr. Zuniga office to see if is to take if so will have them call into [facility]. No edema LCTA. Reports back pain 7/10 tolerable on current meds has MD appt with pain management this month pt has marked on calender reports did not see Dr. Gonzalez her ride didnt show up to take her. Reports taking eliquis as ordered BID.     Skill/education provided: Cardiopulmonary assessment Medication reconciliation     Patient/caregiver response: Voiced understanding that will check with Dr. Gonzalez about digoxin.     Plan for next visit: Cardiopulmonary assessment follow up on pain management appt. Possible recert vs DC

## 2022-09-14 RX ORDER — WARFARIN SODIUM 5 MG/1
5 TABLET ORAL
Qty: 90 TABLET | OUTPATIENT
Start: 2022-09-14

## 2022-09-14 RX ORDER — DIGOXIN 125 MCG
125 TABLET ORAL
Qty: 90 TABLET | Refills: 0
Start: 2022-09-14

## 2022-09-14 NOTE — TELEPHONE ENCOUNTER
Rx Refill Note  Requested Prescriptions     Pending Prescriptions Disp Refills   • digoxin (LANOXIN) 125 MCG tablet 90 tablet 0     Sig: Take 1 tablet by mouth Daily.      Last office visit with prescribing clinician: Visit date not found      Next office visit with prescribing clinician: Visit date not found            Shellie Terrell MA  09/14/22, 09:28 EDT

## 2022-09-14 NOTE — TELEPHONE ENCOUNTER
Patient is no longer on Warfarin she was switched at the hospital to Eliquis. Medication refused refill not appropriate           Rx Refill Note  Requested Prescriptions     Refused Prescriptions Disp Refills   • warfarin (COUMADIN) 5 MG tablet [Pharmacy Med Name: WARFARIN SOD 5MG TABLETS (PEACH)] 90 tablet      Sig: Take 1 tablet by mouth Daily.      Last office visit with prescribing clinician: Visit date not found      Next office visit with prescribing clinician: 9/14/2022                Discharge Summary by Dom Murray MD (09/02/2022 12:41)      Marti Tian LPN  09/14/22, 15:53 EDT

## 2022-09-15 ENCOUNTER — HOME CARE VISIT (OUTPATIENT)
Dept: HOME HEALTH SERVICES | Facility: HOME HEALTHCARE | Age: 82
End: 2022-09-15

## 2022-09-19 ENCOUNTER — HOME CARE VISIT (OUTPATIENT)
Dept: HOME HEALTH SERVICES | Facility: HOME HEALTHCARE | Age: 82
End: 2022-09-19

## 2022-09-20 ENCOUNTER — HOME CARE VISIT (OUTPATIENT)
Dept: HOME HEALTH SERVICES | Facility: HOME HEALTHCARE | Age: 82
End: 2022-09-20

## 2022-09-20 ENCOUNTER — TELEPHONE (OUTPATIENT)
Dept: PAIN MEDICINE | Facility: CLINIC | Age: 82
End: 2022-09-20

## 2022-09-20 ENCOUNTER — READMISSION MANAGEMENT (OUTPATIENT)
Dept: CALL CENTER | Facility: HOSPITAL | Age: 82
End: 2022-09-20

## 2022-09-20 NOTE — OUTREACH NOTE
CHF Week 3 Survey    Flowsheet Row Responses   [facility] facility patient discharged from? [facility]   Does the patient have one of the following disease processes/diagnoses(primary or secondary)? CHF   Week 3 attempt successful? No   Unsuccessful attempts Attempt 1          SAMUEL WHITTEN - Registered Nurse

## 2022-09-20 NOTE — CASE COMMUNICATION
Patient not seen for PT as scheduled this day.  No answer of phone or return of voice message left.  Visit moved to Thursday, 9/22/2022.

## 2022-09-21 ENCOUNTER — HOME CARE VISIT (OUTPATIENT)
Dept: HOME HEALTH SERVICES | Facility: HOME HEALTHCARE | Age: 82
End: 2022-09-21

## 2022-09-21 PROCEDURE — G0299 HHS/HOSPICE OF RN EA 15 MIN: HCPCS

## 2022-09-22 ENCOUNTER — HOME CARE VISIT (OUTPATIENT)
Dept: HOME HEALTH SERVICES | Facility: HOME HEALTHCARE | Age: 82
End: 2022-09-22

## 2022-09-22 VITALS
WEIGHT: 157.25 LBS | OXYGEN SATURATION: 99 % | BODY MASS INDEX: 27.86 KG/M2 | TEMPERATURE: 98.6 F | RESPIRATION RATE: 20 BRPM | SYSTOLIC BLOOD PRESSURE: 170 MMHG | DIASTOLIC BLOOD PRESSURE: 92 MMHG | HEIGHT: 63 IN | HEART RATE: 96 BPM

## 2022-09-22 NOTE — CASE COMMUNICATION
PT visit on 9/22/2022 moved to 9/23/2022 due to patient does not answer multiple call or return voice messaged left. If no response by 9/23/2022 patient will be discharged from PT.

## 2022-09-22 NOTE — HOME HEALTH
Routine Visit Note: Met SN at door gait slow steady. Talkative. Reports was nauseated yesterday and didnt make pain management appt. Reports she is to reschedule. No Nausea today. Reports back pain pretty good today rates as 7/10. Heart rate remains irregular. Denies chest pain reports occ. dizzy. Blood pressure elevated 170/92 had not taken meds yet today she then proceeded to take. No edema wtt 157.4  Main focus of care cardiopulmonary assessment related to paroxysmal atrial fibrillation    Skill/education provided: Medication reconciliation completed Cardiopulmonary assessment, Recert completed    Patient/caregiver response: Voiced understanding     Plan for next visit: Cardiopulmonary assessment assess pain assess for falls.

## 2022-09-23 ENCOUNTER — HOME CARE VISIT (OUTPATIENT)
Dept: HOME HEALTH SERVICES | Facility: HOME HEALTHCARE | Age: 82
End: 2022-09-23

## 2022-09-23 ENCOUNTER — READMISSION MANAGEMENT (OUTPATIENT)
Dept: CALL CENTER | Facility: HOSPITAL | Age: 82
End: 2022-09-23

## 2022-09-23 NOTE — OUTREACH NOTE
CHF Week 3 Survey    Flowsheet Row Responses   [facility] facility patient discharged from? [facility]   Does the patient have one of the following disease processes/diagnoses(primary or secondary)? CHF   Week 3 attempt successful? No   Unsuccessful attempts Attempt 2          REILLY MCKEON - Registered Nurse

## 2022-09-23 NOTE — HOME HEALTH
PT Discipline Discharge (without visit). The patient is an 82 year old female referred for home health PT due to recent hospitalization for afib with history of SN resumption of care visit on 9/4/2022.    Patient was seen by PT for evaluation on 9/7/2022 and found to be complaining of LE weakness and fatigue with short distance ambulation in house. She was noted to need only supervision to ambulate x 150 ft with use of rollator and scored 23/28 on the Tinetti Balance Assessment indicating a falls risk.     Patient was schedule for subsequent PT visits but unable to establish contact with patient although attempted by multiple different therapist since evaluation.    Patient has not answered or returned voice message left to schedule visits and cancelled one recent visit when PT made a visit to home.    It can only be assumed that patient does not want PT at this time.    Patient remains under the care of home health skilled nursing at the time of this PT discipline discharge.

## 2022-09-28 ENCOUNTER — HOME CARE VISIT (OUTPATIENT)
Dept: HOME HEALTH SERVICES | Facility: HOME HEALTHCARE | Age: 82
End: 2022-09-28

## 2022-09-28 VITALS
SYSTOLIC BLOOD PRESSURE: 130 MMHG | HEART RATE: 88 BPM | TEMPERATURE: 98.7 F | OXYGEN SATURATION: 98 % | DIASTOLIC BLOOD PRESSURE: 80 MMHG | RESPIRATION RATE: 20 BRPM

## 2022-09-28 PROCEDURE — G0299 HHS/HOSPICE OF RN EA 15 MIN: HCPCS

## 2022-09-28 NOTE — HOME HEALTH
Routine Visit Note: Reports back pain kept her up all night last night low back into legs rated as 9/10 to see pain management tomorrow. Very talkative today. Noted heart rate to be regular then has a run of rapid beats no chest pain or shortness of breath reported. No edema LCTA BM 9/27 No noted or reported s.s. bleeding.     Skill/education provided: Cardiopulmonary assessment assess pain wearing pain patch for pain. Instructed to keep appt. tomorrow with pain management.     Patient/caregiver response: Voiced understanding of instructions to keep appt.     Plan for next visit: Cardiopulmonary  assessment follow up on pain clinic appt. assess pain

## 2022-10-04 ENCOUNTER — OFFICE VISIT (OUTPATIENT)
Dept: PAIN MEDICINE | Facility: CLINIC | Age: 82
End: 2022-10-04

## 2022-10-04 VITALS
HEART RATE: 122 BPM | RESPIRATION RATE: 16 BRPM | SYSTOLIC BLOOD PRESSURE: 144 MMHG | OXYGEN SATURATION: 97 % | DIASTOLIC BLOOD PRESSURE: 94 MMHG

## 2022-10-04 DIAGNOSIS — G25.81 RESTLESS LEG: ICD-10-CM

## 2022-10-04 DIAGNOSIS — M96.1 POSTLAMINECTOMY SYNDROME OF LUMBAR REGION: ICD-10-CM

## 2022-10-04 DIAGNOSIS — M79.2 NEUROPATHIC PAIN: ICD-10-CM

## 2022-10-04 DIAGNOSIS — G89.4 CHRONIC PAIN SYNDROME: ICD-10-CM

## 2022-10-04 DIAGNOSIS — M48.062 LUMBAR STENOSIS WITH NEUROGENIC CLAUDICATION: ICD-10-CM

## 2022-10-04 DIAGNOSIS — Z79.899 HIGH RISK MEDICATION USE: Primary | ICD-10-CM

## 2022-10-04 PROCEDURE — 99214 OFFICE O/P EST MOD 30 MIN: CPT | Performed by: ANESTHESIOLOGY

## 2022-10-04 RX ORDER — METOPROLOL SUCCINATE 50 MG/1
TABLET, EXTENDED RELEASE ORAL
COMMUNITY
End: 2023-01-10 | Stop reason: HOSPADM

## 2022-10-04 RX ORDER — HYDROCODONE BITARTRATE AND ACETAMINOPHEN 7.5; 325 MG/1; MG/1
1 TABLET ORAL 4 TIMES DAILY PRN
Qty: 120 TABLET | Refills: 0 | Status: SHIPPED | OUTPATIENT
Start: 2022-10-04 | End: 2023-01-04 | Stop reason: SDUPTHER

## 2022-10-04 RX ORDER — ROPINIROLE 0.25 MG/1
0.25 TABLET, FILM COATED ORAL NIGHTLY
Qty: 30 TABLET | Refills: 5 | Status: SHIPPED | OUTPATIENT
Start: 2022-10-04

## 2022-10-04 RX ORDER — TIZANIDINE 4 MG/1
4 TABLET ORAL 2 TIMES DAILY PRN
COMMUNITY
Start: 2022-09-13

## 2022-10-04 RX ORDER — SILDENAFIL CITRATE 20 MG/1
TABLET ORAL
COMMUNITY
Start: 2022-09-23 | End: 2023-01-15

## 2022-10-04 RX ORDER — HYDROCODONE BITARTRATE AND ACETAMINOPHEN 7.5; 325 MG/1; MG/1
1 TABLET ORAL 4 TIMES DAILY PRN
Qty: 120 TABLET | Refills: 0 | Status: SHIPPED | OUTPATIENT
Start: 2022-11-02

## 2022-10-04 RX ORDER — WARFARIN SODIUM 5 MG/1
TABLET ORAL
COMMUNITY
Start: 2022-09-10 | End: 2022-10-04 | Stop reason: SDUPTHER

## 2022-10-04 NOTE — PROGRESS NOTES
CC back pain, jonah leg pain  82-year-old female with HTN, depression, chronic polyarthralgia from osteoarthritis, chronic back pain S/p L4-5 laminectomy with bony fusion 2018/Noelle., here for follow-up.  Continues to have issues with ALBERTO nichols with RVR.  Was hospitalized once since last visit.  Now on Eliquis.  Denies new complaints, continues chronic back pain and bilateral lower extremity pain.    Chronic back pain bilateral lower extremity radicular pain and neurogenic claudication symptoms.  Denies saddle anesthesia, bladder or bowel incontinence.  Chronic polyarthralgia / left knee pain/ bilateral feet neuropathic pain    Still  limited in ADL due to bilateral lower extremity pain and weakness.  Ambulating with walker    Utilizes gabapentin, hydrocodone with good relief functional benefit without side effects.      L-Spine x-ray 2019 Degenerative changes of the lumbar spine with stable 6 mm anterolisthesis L4 upon L5  L-spine MRI 2019: postoperative changes are noted at L4 and L5 with laminectomies and posterior bony fusion. Degenerative changes are seen at multiple levels, greatest at L3-4, with moderate to severe spinal canal narrowing and moderate bilateral foraminal narrowing, greater on the left    C-spine CT 2017:  Degenerative changes spondylosis with central stenosis at C5-C6 and left foraminal stenosis C4/5    Pain Assessment   Location of Pain: Lower Back, R Hip, L Hip, R Leg, L Leg  Description of Pain: Dull/Aching, Throbbing, Stabbing  Previous Pain Rating : 8  Current Pain Ratin  Aggravating Factors: Activity  Alleviating Factors: Rest, Medication  Previous Treatments: Nerve Blocks/Injections, Epidural Steroids, Narcotic Pain Medication, Physical Therapy  Previous Diagnostic Studies: X-Ray, MRI    PEG Assessment   What number best describes your pain on average in the past week?10  What number best describes how, during the past week, pain has interfered with your enjoyment of life?7  What  number best describes how, during the past week, pain has interfered with your general activity? 10     has a past medical history of Acute kidney injury (HCC) (7/22/2022), Anxiety, Arthritis, Atrial fibrillation (HCC), Broken shoulder, Buttock pain, DDD (degenerative disc disease), lumbar, Depression, Edema, GERD (gastroesophageal reflux disease), H/O fall, Hip pain, Hypertension, Hypothyroidism, Insomnia, Irregular heart beat, Leg pain, Low back pain, Neuropathy, Pain in both feet, PONV (postoperative nausea and vomiting), Pulmonary hypertension (HCC), Radiculopathy, Restless legs, Spondylolisthesis, and Urinary incontinence.     has a past surgical history that includes Hysterectomy; Rotator cuff repair (Left); Cholecystectomy; Colonoscopy; Back surgery (07/19/2018); and Cardiac catheterization (N/A, 4/2/2021).  .  Social History     Tobacco Use   • Smoking status: Never Smoker   • Smokeless tobacco: Never Used   Substance Use Topics   • Alcohol use: No       Review of Systems        See HPI      Vitals:    10/04/22 1330   BP: 144/94   Pulse: (!) 122   Resp: 16   SpO2: 97%     Physical Exam  Vitals reviewed.   Constitutional:       General: She is not in acute distress.     Comments: walker   Pulmonary:      Effort: Pulmonary effort is normal.   Musculoskeletal:      Lumbar back: Tenderness present. Decreased range of motion. Positive right straight leg raise test and positive left straight leg raise test.     PHQ9 on chart                    opioid risk tool high risk        Assessment/Plan  Diagnoses and all orders for this visit:    1. Chronic pain syndrome (Primary)  -     HYDROcodone-acetaminophen (NORCO) 7.5-325 MG per tablet; Take 1 tablet by mouth 4 (Four) Times a Day As Needed for Severe Pain. DNF before 11/2/2022  Dispense: 120 tablet; Refill: 0  -     HYDROcodone-acetaminophen (NORCO) 7.5-325 MG per tablet; Take 1 tablet by mouth 4 (Four) Times a Day As Needed for Severe Pain.  Dispense: 120 tablet;  Refill: 0    2. Neuropathic pain    3. Postlaminectomy syndrome of lumbar region    4. Lumbar stenosis with neurogenic claudication    5. Restless leg  -     rOPINIRole (REQUIP) 0.25 MG tablet; Take 1 tablet by mouth Every Night. Take 1 hour before bedtime  Dispense: 30 tablet; Refill: 5    6. High risk medication use    Summary:   82-year-old female with HTN, depression, chronic polyarthralgia from osteoarthritis, chronic back pain from DDD/ spondylosis/stenosis, S/p L4/5 laminectomy with bony fusion 7/2018/ Majd seen in  follow-up.     Chronic lower lumbar /coccyx pain and RLE radicular pain, continued bilateral lower extremity weakness.  Consider repeat caudal/ LESI as needed    Continues to have issues with A. fib with RVR.  Was hospitalized once since last visit.  Now on Eliquis.  Denies new complaints, continues chronic back pain and bilateral lower extremity pain.      Continue hydrocodone 7.5/325 4 times daily as needed for severe pain.  UDS and Inspect reviewed.   Discussed risk of tolerance, dependence, respiratory depression, coma and death associated with use of oral opioids for treatment of chronic nonmalignant pain.      Cont Lidocaine patch for worsening myofascial and neuropathic pain.   Cont requip for RLS at night    RTC  in  2 mo.

## 2022-10-05 ENCOUNTER — HOME CARE VISIT (OUTPATIENT)
Dept: HOME HEALTH SERVICES | Facility: HOME HEALTHCARE | Age: 82
End: 2022-10-05

## 2022-10-05 VITALS
HEART RATE: 116 BPM | SYSTOLIC BLOOD PRESSURE: 140 MMHG | DIASTOLIC BLOOD PRESSURE: 86 MMHG | RESPIRATION RATE: 20 BRPM | TEMPERATURE: 97.3 F | OXYGEN SATURATION: 98 %

## 2022-10-05 PROCEDURE — G0299 HHS/HOSPICE OF RN EA 15 MIN: HCPCS

## 2022-10-06 NOTE — HOME HEALTH
Routine Visit Note: Reports went to pain clinic yesterday feeling better after obtaining pain med reports rested better last night. Rates pain as 6/10 today at highest and 4/10 after pain med. Talkative reports pain management MD wants her to make appt with cardiology due to heart rate and rhythm and blood pressure inst. Dr. Gonzalez sees pts at [facility] if easier for her to get there inst. [place] given [place] to make appt. Reports she will. Heart rate irregular with periods of in and out of rhythm. Denies chest pain or shortness of breath. Reports fair appetite. No edema noted. LCTA     Skill/education provided: Rev. current medications inst pt to take to appt with cardiologist when goes. Cardiopulmonary assessment assess for falls assess pain and pain mangement.     Patient/caregiver response: Voiced understanding     Plan for next visit: cardiopulmonary assessment see if pt made appt. with  assess pain assess for falls

## 2022-10-12 ENCOUNTER — HOME CARE VISIT (OUTPATIENT)
Dept: HOME HEALTH SERVICES | Facility: HOME HEALTHCARE | Age: 82
End: 2022-10-12

## 2022-10-12 VITALS
SYSTOLIC BLOOD PRESSURE: 160 MMHG | HEART RATE: 126 BPM | OXYGEN SATURATION: 97 % | TEMPERATURE: 98.3 F | DIASTOLIC BLOOD PRESSURE: 100 MMHG | RESPIRATION RATE: 20 BRPM

## 2022-10-12 PROCEDURE — G0299 HHS/HOSPICE OF RN EA 15 MIN: HCPCS

## 2022-10-13 NOTE — HOME HEALTH
Routine Visit Note: Son present he and pt bickering over taking vehicle to have mirror fixed told her maybe tomorrow. Pt reports blood pressure probably elevated as he is aggravating her.Rates pain 9/10 reports pain in shoulder that she wants to have looked at inst. they will probably xray or scan maybe inject before they would talk any surgery. Inst. if she does have shoulder work will need to go to rehab afterwards as she doesnt have dependable help at home and arm would be immobile.     Skill/education provided: Rev. current meds inst to take as ordered to see if will help control heart rate. Pt not always compliant with taking meds as ordered. LCTA Cardiopulmonary assessment. Signed advance notice that may be discharged next SN visit     Patient/caregiver response: Voiced understanding to instructions.     Plan for next visit: assess pain see if pt ever made follow up with cardiologist. Discharge instructions

## 2022-10-18 ENCOUNTER — HOME CARE VISIT (OUTPATIENT)
Dept: HOME HEALTH SERVICES | Facility: HOME HEALTHCARE | Age: 82
End: 2022-10-18

## 2022-10-19 ENCOUNTER — HOME CARE VISIT (OUTPATIENT)
Dept: HOME HEALTH SERVICES | Facility: HOME HEALTHCARE | Age: 82
End: 2022-10-19

## 2022-10-19 VITALS
OXYGEN SATURATION: 98 % | DIASTOLIC BLOOD PRESSURE: 80 MMHG | HEART RATE: 96 BPM | SYSTOLIC BLOOD PRESSURE: 150 MMHG | RESPIRATION RATE: 20 BRPM | TEMPERATURE: 98.6 F

## 2022-10-19 PROCEDURE — G0299 HHS/HOSPICE OF RN EA 15 MIN: HCPCS

## 2022-10-19 NOTE — CASE COMMUNICATION
HUD DC- HUDDLE DISCHARGE Planned discharge 10/19/2022    Plan for discharge: Discharge to home self care with occassional assist from son     Barriers to discharge: None     Ongoing needs: Currently goals met    Any referrals needed: No    Any declines in outcomes: discuss and document reason: No    Teaching needed prior to discharge: Has been completed     Date of last visit by each discipline:SN to be 10/19/2022   9/7/2022 initial PT  eval rest of visits MVN.     Assign DC notice responsibility: Sasha CARDONA    Assign oasis discharge responsibility: KALIA Park RN

## 2022-10-20 NOTE — HOME HEALTH
Routine Visit Note:Moving about house gait steady not using assistive device Talkative reports doing ok still pain in legs but is tolerable. 7/10 today. LCTA abd soft non distended no edema.     Skill/education provided: Cardiopulmonary assessment. Discharge instructions     Patient/caregiver response: In agreeement with discharge today Voiced understanding to instructions.

## 2022-10-31 RX ORDER — DILTIAZEM HYDROCHLORIDE 240 MG/1
CAPSULE, COATED, EXTENDED RELEASE ORAL
Qty: 90 CAPSULE | OUTPATIENT
Start: 2022-10-31

## 2022-11-29 RX ORDER — GABAPENTIN 600 MG/1
TABLET ORAL
Qty: 120 TABLET | Refills: 11 | Status: SHIPPED | OUTPATIENT
Start: 2022-11-29

## 2023-01-04 ENCOUNTER — APPOINTMENT (OUTPATIENT)
Dept: GENERAL RADIOLOGY | Facility: HOSPITAL | Age: 83
DRG: 309 | End: 2023-01-04
Payer: MEDICARE

## 2023-01-04 ENCOUNTER — HOSPITAL ENCOUNTER (INPATIENT)
Facility: HOSPITAL | Age: 83
LOS: 6 days | Discharge: HOME HEALTH | DRG: 309 | End: 2023-01-10
Attending: EMERGENCY MEDICINE | Admitting: INTERNAL MEDICINE
Payer: MEDICARE

## 2023-01-04 DIAGNOSIS — R10.84 GENERALIZED ABDOMINAL PAIN: ICD-10-CM

## 2023-01-04 DIAGNOSIS — I48.91 ATRIAL FIBRILLATION WITH RAPID VENTRICULAR RESPONSE: Primary | ICD-10-CM

## 2023-01-04 LAB
ALBUMIN SERPL-MCNC: 4.1 G/DL (ref 3.5–5.2)
ALBUMIN/GLOB SERPL: 1.5 G/DL
ALP SERPL-CCNC: 85 U/L (ref 39–117)
ALT SERPL W P-5'-P-CCNC: 19 U/L (ref 1–33)
ANION GAP SERPL CALCULATED.3IONS-SCNC: 15 MMOL/L (ref 5–15)
APTT PPP: 32.2 SECONDS (ref 24–31)
AST SERPL-CCNC: 39 U/L (ref 1–32)
BASOPHILS # BLD AUTO: 0.1 10*3/MM3 (ref 0–0.2)
BASOPHILS NFR BLD AUTO: 1.4 % (ref 0–1.5)
BILIRUB SERPL-MCNC: 2 MG/DL (ref 0–1.2)
BUN SERPL-MCNC: 18 MG/DL (ref 8–23)
BUN/CREAT SERPL: 15.1 (ref 7–25)
CALCIUM SPEC-SCNC: 8.7 MG/DL (ref 8.6–10.5)
CHLORIDE SERPL-SCNC: 105 MMOL/L (ref 98–107)
CHOLEST SERPL-MCNC: 103 MG/DL (ref 0–200)
CO2 SERPL-SCNC: 20 MMOL/L (ref 22–29)
CREAT SERPL-MCNC: 1.19 MG/DL (ref 0.57–1)
D-LACTATE SERPL-SCNC: 1.4 MMOL/L (ref 0.5–2)
DEPRECATED RDW RBC AUTO: 53.8 FL (ref 37–54)
DIGOXIN SERPL-MCNC: 0.3 NG/ML (ref 0.6–1.2)
EGFRCR SERPLBLD CKD-EPI 2021: 46 ML/MIN/1.73
EOSINOPHIL # BLD AUTO: 0.1 10*3/MM3 (ref 0–0.4)
EOSINOPHIL NFR BLD AUTO: 1.6 % (ref 0.3–6.2)
ERYTHROCYTE [DISTWIDTH] IN BLOOD BY AUTOMATED COUNT: 18.1 % (ref 12.3–15.4)
GLOBULIN UR ELPH-MCNC: 2.7 GM/DL
GLUCOSE SERPL-MCNC: 104 MG/DL (ref 65–99)
HCT VFR BLD AUTO: 33.7 % (ref 34–46.6)
HDLC SERPL-MCNC: 36 MG/DL (ref 40–60)
HGB BLD-MCNC: 10.5 G/DL (ref 12–15.9)
HOLD SPECIMEN: NORMAL
HOLD SPECIMEN: NORMAL
INR PPP: 1.28 (ref 0.93–1.1)
LDLC SERPL CALC-MCNC: 53 MG/DL (ref 0–100)
LDLC/HDLC SERPL: 1.51 {RATIO}
LYMPHOCYTES # BLD AUTO: 2.3 10*3/MM3 (ref 0.7–3.1)
LYMPHOCYTES NFR BLD AUTO: 39.9 % (ref 19.6–45.3)
MAGNESIUM SERPL-MCNC: 1.9 MG/DL (ref 1.6–2.4)
MCH RBC QN AUTO: 26.7 PG (ref 26.6–33)
MCHC RBC AUTO-ENTMCNC: 31.1 G/DL (ref 31.5–35.7)
MCV RBC AUTO: 85.7 FL (ref 79–97)
MONOCYTES # BLD AUTO: 0.4 10*3/MM3 (ref 0.1–0.9)
MONOCYTES NFR BLD AUTO: 7.3 % (ref 5–12)
NEUTROPHILS NFR BLD AUTO: 2.8 10*3/MM3 (ref 1.7–7)
NEUTROPHILS NFR BLD AUTO: 49.8 % (ref 42.7–76)
NRBC BLD AUTO-RTO: 0.1 /100 WBC (ref 0–0.2)
NT-PROBNP SERPL-MCNC: 9074 PG/ML (ref 0–1800)
PLATELET # BLD AUTO: 201 10*3/MM3 (ref 140–450)
PMV BLD AUTO: 9.3 FL (ref 6–12)
POTASSIUM SERPL-SCNC: 4 MMOL/L (ref 3.5–5.2)
PROT SERPL-MCNC: 6.8 G/DL (ref 6–8.5)
PROTHROMBIN TIME: 13 SECONDS (ref 9.6–11.7)
RBC # BLD AUTO: 3.93 10*6/MM3 (ref 3.77–5.28)
SODIUM SERPL-SCNC: 140 MMOL/L (ref 136–145)
T4 FREE SERPL-MCNC: 1.16 NG/DL (ref 0.93–1.7)
TRIGL SERPL-MCNC: 63 MG/DL (ref 0–150)
TROPONIN T SERPL-MCNC: <0.01 NG/ML (ref 0–0.03)
TSH SERPL DL<=0.05 MIU/L-ACNC: 5.66 UIU/ML (ref 0.27–4.2)
VLDLC SERPL-MCNC: 14 MG/DL (ref 5–40)
WBC NRBC COR # BLD: 5.7 10*3/MM3 (ref 3.4–10.8)
WHOLE BLOOD HOLD COAG: NORMAL
WHOLE BLOOD HOLD SPECIMEN: NORMAL

## 2023-01-04 PROCEDURE — 83880 ASSAY OF NATRIURETIC PEPTIDE: CPT | Performed by: EMERGENCY MEDICINE

## 2023-01-04 PROCEDURE — 85025 COMPLETE CBC W/AUTO DIFF WBC: CPT | Performed by: EMERGENCY MEDICINE

## 2023-01-04 PROCEDURE — 80061 LIPID PANEL: CPT | Performed by: NURSE PRACTITIONER

## 2023-01-04 PROCEDURE — 80162 ASSAY OF DIGOXIN TOTAL: CPT | Performed by: EMERGENCY MEDICINE

## 2023-01-04 PROCEDURE — 93005 ELECTROCARDIOGRAM TRACING: CPT | Performed by: EMERGENCY MEDICINE

## 2023-01-04 PROCEDURE — 83605 ASSAY OF LACTIC ACID: CPT

## 2023-01-04 PROCEDURE — 25010000002 ENOXAPARIN PER 10 MG: Performed by: NURSE PRACTITIONER

## 2023-01-04 PROCEDURE — 84443 ASSAY THYROID STIM HORMONE: CPT | Performed by: NURSE PRACTITIONER

## 2023-01-04 PROCEDURE — 71045 X-RAY EXAM CHEST 1 VIEW: CPT

## 2023-01-04 PROCEDURE — 80053 COMPREHEN METABOLIC PANEL: CPT | Performed by: EMERGENCY MEDICINE

## 2023-01-04 PROCEDURE — 99285 EMERGENCY DEPT VISIT HI MDM: CPT

## 2023-01-04 PROCEDURE — 83735 ASSAY OF MAGNESIUM: CPT | Performed by: NURSE PRACTITIONER

## 2023-01-04 PROCEDURE — 84484 ASSAY OF TROPONIN QUANT: CPT | Performed by: EMERGENCY MEDICINE

## 2023-01-04 PROCEDURE — 85610 PROTHROMBIN TIME: CPT | Performed by: NURSE PRACTITIONER

## 2023-01-04 PROCEDURE — 84439 ASSAY OF FREE THYROXINE: CPT | Performed by: NURSE PRACTITIONER

## 2023-01-04 PROCEDURE — 85730 THROMBOPLASTIN TIME PARTIAL: CPT | Performed by: NURSE PRACTITIONER

## 2023-01-04 RX ORDER — SODIUM CHLORIDE 0.9 % (FLUSH) 0.9 %
10 SYRINGE (ML) INJECTION EVERY 12 HOURS SCHEDULED
Status: DISCONTINUED | OUTPATIENT
Start: 2023-01-04 | End: 2023-01-10 | Stop reason: HOSPADM

## 2023-01-04 RX ORDER — ONDANSETRON 2 MG/ML
4 INJECTION INTRAMUSCULAR; INTRAVENOUS EVERY 6 HOURS PRN
Status: DISCONTINUED | OUTPATIENT
Start: 2023-01-04 | End: 2023-01-10 | Stop reason: HOSPADM

## 2023-01-04 RX ORDER — GABAPENTIN 300 MG/1
600 CAPSULE ORAL 4 TIMES DAILY
Status: DISCONTINUED | OUTPATIENT
Start: 2023-01-04 | End: 2023-01-10 | Stop reason: HOSPADM

## 2023-01-04 RX ORDER — MAGNESIUM SULFATE HEPTAHYDRATE 40 MG/ML
2 INJECTION, SOLUTION INTRAVENOUS AS NEEDED
Status: DISCONTINUED | OUTPATIENT
Start: 2023-01-04 | End: 2023-01-10 | Stop reason: HOSPADM

## 2023-01-04 RX ORDER — SODIUM CHLORIDE 9 MG/ML
40 INJECTION, SOLUTION INTRAVENOUS AS NEEDED
Status: DISCONTINUED | OUTPATIENT
Start: 2023-01-04 | End: 2023-01-10 | Stop reason: HOSPADM

## 2023-01-04 RX ORDER — SODIUM CHLORIDE 0.9 % (FLUSH) 0.9 %
10 SYRINGE (ML) INJECTION AS NEEDED
Status: DISCONTINUED | OUTPATIENT
Start: 2023-01-04 | End: 2023-01-10 | Stop reason: HOSPADM

## 2023-01-04 RX ORDER — ONDANSETRON 4 MG/1
4 TABLET, FILM COATED ORAL EVERY 6 HOURS PRN
Status: DISCONTINUED | OUTPATIENT
Start: 2023-01-04 | End: 2023-01-10 | Stop reason: HOSPADM

## 2023-01-04 RX ORDER — NITROGLYCERIN 0.4 MG/1
0.4 TABLET SUBLINGUAL
Status: DISCONTINUED | OUTPATIENT
Start: 2023-01-04 | End: 2023-01-05

## 2023-01-04 RX ORDER — ENOXAPARIN SODIUM 100 MG/ML
1 INJECTION SUBCUTANEOUS EVERY 12 HOURS
Status: DISCONTINUED | OUTPATIENT
Start: 2023-01-04 | End: 2023-01-05

## 2023-01-04 RX ORDER — HYDROCODONE BITARTRATE AND ACETAMINOPHEN 5; 325 MG/1; MG/1
1 TABLET ORAL ONCE AS NEEDED
Status: DISCONTINUED | OUTPATIENT
Start: 2023-01-04 | End: 2023-01-04

## 2023-01-04 RX ORDER — MAGNESIUM SULFATE 1 G/100ML
1 INJECTION INTRAVENOUS AS NEEDED
Status: DISCONTINUED | OUTPATIENT
Start: 2023-01-04 | End: 2023-01-10 | Stop reason: HOSPADM

## 2023-01-04 RX ORDER — CHOLECALCIFEROL (VITAMIN D3) 125 MCG
5 CAPSULE ORAL NIGHTLY PRN
Status: DISCONTINUED | OUTPATIENT
Start: 2023-01-04 | End: 2023-01-10 | Stop reason: HOSPADM

## 2023-01-04 RX ORDER — HYDROCODONE BITARTRATE AND ACETAMINOPHEN 7.5; 325 MG/1; MG/1
1 TABLET ORAL EVERY 6 HOURS PRN
Status: DISCONTINUED | OUTPATIENT
Start: 2023-01-04 | End: 2023-01-10 | Stop reason: HOSPADM

## 2023-01-04 RX ORDER — AMOXICILLIN 250 MG
1 CAPSULE ORAL NIGHTLY PRN
Status: DISCONTINUED | OUTPATIENT
Start: 2023-01-04 | End: 2023-01-10 | Stop reason: HOSPADM

## 2023-01-04 RX ORDER — POTASSIUM CHLORIDE 20 MEQ/1
40 TABLET, EXTENDED RELEASE ORAL AS NEEDED
Status: DISCONTINUED | OUTPATIENT
Start: 2023-01-04 | End: 2023-01-10 | Stop reason: HOSPADM

## 2023-01-04 RX ORDER — ALUMINA, MAGNESIA, AND SIMETHICONE 2400; 2400; 240 MG/30ML; MG/30ML; MG/30ML
15 SUSPENSION ORAL EVERY 6 HOURS PRN
Status: DISCONTINUED | OUTPATIENT
Start: 2023-01-04 | End: 2023-01-10 | Stop reason: HOSPADM

## 2023-01-04 RX ORDER — DILTIAZEM HCL IN NACL,ISO-OSM 125 MG/125
5-15 PLASTIC BAG, INJECTION (ML) INTRAVENOUS
Status: DISCONTINUED | OUTPATIENT
Start: 2023-01-04 | End: 2023-01-05

## 2023-01-04 RX ORDER — DILTIAZEM HYDROCHLORIDE 5 MG/ML
20 INJECTION INTRAVENOUS ONCE
Status: COMPLETED | OUTPATIENT
Start: 2023-01-04 | End: 2023-01-04

## 2023-01-04 RX ORDER — ACETAMINOPHEN 160 MG/5ML
650 SOLUTION ORAL EVERY 4 HOURS PRN
Status: DISCONTINUED | OUTPATIENT
Start: 2023-01-04 | End: 2023-01-10 | Stop reason: HOSPADM

## 2023-01-04 RX ORDER — ACETAMINOPHEN 650 MG/1
650 SUPPOSITORY RECTAL EVERY 4 HOURS PRN
Status: DISCONTINUED | OUTPATIENT
Start: 2023-01-04 | End: 2023-01-10 | Stop reason: HOSPADM

## 2023-01-04 RX ORDER — POTASSIUM CHLORIDE 1.5 G/1.77G
40 POWDER, FOR SOLUTION ORAL AS NEEDED
Status: DISCONTINUED | OUTPATIENT
Start: 2023-01-04 | End: 2023-01-10 | Stop reason: HOSPADM

## 2023-01-04 RX ORDER — HYDROCODONE BITARTRATE AND ACETAMINOPHEN 7.5; 325 MG/1; MG/1
1 TABLET ORAL ONCE
Status: COMPLETED | OUTPATIENT
Start: 2023-01-04 | End: 2023-01-04

## 2023-01-04 RX ORDER — ACETAMINOPHEN 325 MG/1
650 TABLET ORAL EVERY 4 HOURS PRN
Status: DISCONTINUED | OUTPATIENT
Start: 2023-01-04 | End: 2023-01-10 | Stop reason: HOSPADM

## 2023-01-04 RX ORDER — CALCIUM CARBONATE 200(500)MG
2 TABLET,CHEWABLE ORAL 2 TIMES DAILY PRN
Status: DISCONTINUED | OUTPATIENT
Start: 2023-01-04 | End: 2023-01-10 | Stop reason: HOSPADM

## 2023-01-04 RX ADMIN — DILTIAZEM HYDROCHLORIDE 15 MG/HR: 5 INJECTION, SOLUTION INTRAVENOUS at 21:54

## 2023-01-04 RX ADMIN — Medication 10 ML: at 21:52

## 2023-01-04 RX ADMIN — DILTIAZEM HYDROCHLORIDE 20 MG: 5 INJECTION, SOLUTION INTRAVENOUS at 18:52

## 2023-01-04 RX ADMIN — HYDROCODONE BITARTRATE AND ACETAMINOPHEN 1 TABLET: 7.5; 325 TABLET ORAL at 19:40

## 2023-01-04 RX ADMIN — ENOXAPARIN SODIUM 80 MG: 100 INJECTION SUBCUTANEOUS at 20:51

## 2023-01-04 RX ADMIN — DILTIAZEM HYDROCHLORIDE 10 MG/HR: 5 INJECTION, SOLUTION INTRAVENOUS at 18:49

## 2023-01-04 RX ADMIN — GABAPENTIN 600 MG: 300 CAPSULE ORAL at 21:52

## 2023-01-04 RX ADMIN — DILTIAZEM HYDROCHLORIDE 12.5 MG/HR: 5 INJECTION, SOLUTION INTRAVENOUS at 21:11

## 2023-01-04 NOTE — Clinical Note
Level of Care: Telemetry [5]   Admitting Physician: MARCO JONES [[id number]]   Bed Request Comments: u

## 2023-01-05 ENCOUNTER — APPOINTMENT (OUTPATIENT)
Dept: CARDIOLOGY | Facility: HOSPITAL | Age: 83
DRG: 309 | End: 2023-01-05
Payer: MEDICARE

## 2023-01-05 LAB
ANION GAP SERPL CALCULATED.3IONS-SCNC: 11 MMOL/L (ref 5–15)
BASOPHILS # BLD AUTO: 0.1 10*3/MM3 (ref 0–0.2)
BASOPHILS NFR BLD AUTO: 1.7 % (ref 0–1.5)
BUN SERPL-MCNC: 20 MG/DL (ref 8–23)
BUN/CREAT SERPL: 18 (ref 7–25)
CALCIUM SPEC-SCNC: 8.4 MG/DL (ref 8.6–10.5)
CHLORIDE SERPL-SCNC: 107 MMOL/L (ref 98–107)
CO2 SERPL-SCNC: 22 MMOL/L (ref 22–29)
CREAT SERPL-MCNC: 1.11 MG/DL (ref 0.57–1)
DEPRECATED RDW RBC AUTO: 55.6 FL (ref 37–54)
EGFRCR SERPLBLD CKD-EPI 2021: 50 ML/MIN/1.73
EOSINOPHIL # BLD AUTO: 0.2 10*3/MM3 (ref 0–0.4)
EOSINOPHIL NFR BLD AUTO: 3.4 % (ref 0.3–6.2)
ERYTHROCYTE [DISTWIDTH] IN BLOOD BY AUTOMATED COUNT: 18.1 % (ref 12.3–15.4)
GLUCOSE BLDC GLUCOMTR-MCNC: 104 MG/DL (ref 70–105)
GLUCOSE SERPL-MCNC: 124 MG/DL (ref 65–99)
HBA1C MFR BLD: 5.9 % (ref 3.5–5.6)
HCT VFR BLD AUTO: 29 % (ref 34–46.6)
HGB BLD-MCNC: 9.2 G/DL (ref 12–15.9)
LYMPHOCYTES # BLD AUTO: 2.7 10*3/MM3 (ref 0.7–3.1)
LYMPHOCYTES NFR BLD AUTO: 46 % (ref 19.6–45.3)
MAGNESIUM SERPL-MCNC: 1.9 MG/DL (ref 1.6–2.4)
MCH RBC QN AUTO: 26.5 PG (ref 26.6–33)
MCHC RBC AUTO-ENTMCNC: 31.6 G/DL (ref 31.5–35.7)
MCV RBC AUTO: 83.8 FL (ref 79–97)
MONOCYTES # BLD AUTO: 0.4 10*3/MM3 (ref 0.1–0.9)
MONOCYTES NFR BLD AUTO: 7.1 % (ref 5–12)
NEUTROPHILS NFR BLD AUTO: 2.4 10*3/MM3 (ref 1.7–7)
NEUTROPHILS NFR BLD AUTO: 41.8 % (ref 42.7–76)
NRBC BLD AUTO-RTO: 0.2 /100 WBC (ref 0–0.2)
PLATELET # BLD AUTO: 165 10*3/MM3 (ref 140–450)
PMV BLD AUTO: 9.4 FL (ref 6–12)
POTASSIUM SERPL-SCNC: 3.9 MMOL/L (ref 3.5–5.2)
RBC # BLD AUTO: 3.46 10*6/MM3 (ref 3.77–5.28)
SODIUM SERPL-SCNC: 140 MMOL/L (ref 136–145)
TROPONIN T SERPL-MCNC: <0.01 NG/ML (ref 0–0.03)
WBC NRBC COR # BLD: 5.8 10*3/MM3 (ref 3.4–10.8)

## 2023-01-05 PROCEDURE — 84484 ASSAY OF TROPONIN QUANT: CPT | Performed by: NURSE PRACTITIONER

## 2023-01-05 PROCEDURE — 36415 COLL VENOUS BLD VENIPUNCTURE: CPT | Performed by: NURSE PRACTITIONER

## 2023-01-05 PROCEDURE — 93010 ELECTROCARDIOGRAM REPORT: CPT | Performed by: INTERNAL MEDICINE

## 2023-01-05 PROCEDURE — 80048 BASIC METABOLIC PNL TOTAL CA: CPT | Performed by: NURSE PRACTITIONER

## 2023-01-05 PROCEDURE — 85025 COMPLETE CBC W/AUTO DIFF WBC: CPT | Performed by: NURSE PRACTITIONER

## 2023-01-05 PROCEDURE — 93306 TTE W/DOPPLER COMPLETE: CPT

## 2023-01-05 PROCEDURE — 93306 TTE W/DOPPLER COMPLETE: CPT | Performed by: INTERNAL MEDICINE

## 2023-01-05 PROCEDURE — 93005 ELECTROCARDIOGRAM TRACING: CPT | Performed by: NURSE PRACTITIONER

## 2023-01-05 PROCEDURE — 63710000001 ONDANSETRON PER 8 MG: Performed by: NURSE PRACTITIONER

## 2023-01-05 PROCEDURE — 83735 ASSAY OF MAGNESIUM: CPT | Performed by: NURSE PRACTITIONER

## 2023-01-05 PROCEDURE — 82962 GLUCOSE BLOOD TEST: CPT

## 2023-01-05 PROCEDURE — 83036 HEMOGLOBIN GLYCOSYLATED A1C: CPT | Performed by: NURSE PRACTITIONER

## 2023-01-05 RX ORDER — ROPINIROLE 0.25 MG/1
0.25 TABLET, FILM COATED ORAL NIGHTLY
Status: DISCONTINUED | OUTPATIENT
Start: 2023-01-05 | End: 2023-01-10 | Stop reason: HOSPADM

## 2023-01-05 RX ORDER — ROPINIROLE 0.25 MG/1
0.25 TABLET, FILM COATED ORAL ONCE
Status: COMPLETED | OUTPATIENT
Start: 2023-01-05 | End: 2023-01-05

## 2023-01-05 RX ORDER — LANOLIN ALCOHOL/MO/W.PET/CERES
1000 CREAM (GRAM) TOPICAL DAILY
Status: DISCONTINUED | OUTPATIENT
Start: 2023-01-05 | End: 2023-01-10 | Stop reason: HOSPADM

## 2023-01-05 RX ORDER — FAMOTIDINE 20 MG/1
20 TABLET, FILM COATED ORAL DAILY
Status: DISCONTINUED | OUTPATIENT
Start: 2023-01-05 | End: 2023-01-10 | Stop reason: HOSPADM

## 2023-01-05 RX ORDER — PANTOPRAZOLE SODIUM 40 MG/1
40 TABLET, DELAYED RELEASE ORAL
Status: DISCONTINUED | OUTPATIENT
Start: 2023-01-05 | End: 2023-01-10 | Stop reason: HOSPADM

## 2023-01-05 RX ORDER — METOPROLOL SUCCINATE 50 MG/1
50 TABLET, EXTENDED RELEASE ORAL
Status: DISCONTINUED | OUTPATIENT
Start: 2023-01-05 | End: 2023-01-10

## 2023-01-05 RX ORDER — DIGOXIN 125 MCG
125 TABLET ORAL
Status: DISCONTINUED | OUTPATIENT
Start: 2023-01-05 | End: 2023-01-10 | Stop reason: HOSPADM

## 2023-01-05 RX ORDER — SILDENAFIL CITRATE 20 MG/1
20 TABLET ORAL EVERY 8 HOURS SCHEDULED
Status: DISCONTINUED | OUTPATIENT
Start: 2023-01-05 | End: 2023-01-10 | Stop reason: HOSPADM

## 2023-01-05 RX ORDER — ATORVASTATIN CALCIUM 10 MG/1
10 TABLET, FILM COATED ORAL NIGHTLY
Status: DISCONTINUED | OUTPATIENT
Start: 2023-01-05 | End: 2023-01-10 | Stop reason: HOSPADM

## 2023-01-05 RX ORDER — TIZANIDINE 4 MG/1
4 TABLET ORAL EVERY 12 HOURS PRN
Status: DISCONTINUED | OUTPATIENT
Start: 2023-01-05 | End: 2023-01-06

## 2023-01-05 RX ORDER — METOCLOPRAMIDE 5 MG/1
5 TABLET ORAL
Status: DISCONTINUED | OUTPATIENT
Start: 2023-01-05 | End: 2023-01-10 | Stop reason: HOSPADM

## 2023-01-05 RX ORDER — POLYETHYLENE GLYCOL 3350 17 G/17G
17 POWDER, FOR SOLUTION ORAL DAILY
Status: DISCONTINUED | OUTPATIENT
Start: 2023-01-05 | End: 2023-01-10 | Stop reason: HOSPADM

## 2023-01-05 RX ORDER — LEVOTHYROXINE SODIUM 88 UG/1
88 TABLET ORAL DAILY
Status: DISCONTINUED | OUTPATIENT
Start: 2023-01-05 | End: 2023-01-10 | Stop reason: HOSPADM

## 2023-01-05 RX ADMIN — CYANOCOBALAMIN TAB 1000 MCG 1000 MCG: 1000 TAB at 08:30

## 2023-01-05 RX ADMIN — ROPINIROLE HYDROCHLORIDE 0.25 MG: 0.25 TABLET, FILM COATED ORAL at 20:41

## 2023-01-05 RX ADMIN — PANTOPRAZOLE SODIUM 40 MG: 40 TABLET, DELAYED RELEASE ORAL at 11:30

## 2023-01-05 RX ADMIN — DIGOXIN 125 MCG: 125 TABLET ORAL at 11:30

## 2023-01-05 RX ADMIN — METOCLOPRAMIDE 5 MG: 5 TABLET ORAL at 11:30

## 2023-01-05 RX ADMIN — GABAPENTIN 600 MG: 300 CAPSULE ORAL at 20:42

## 2023-01-05 RX ADMIN — HYDROCODONE BITARTRATE AND ACETAMINOPHEN 1 TABLET: 7.5; 325 TABLET ORAL at 00:33

## 2023-01-05 RX ADMIN — GABAPENTIN 600 MG: 300 CAPSULE ORAL at 17:29

## 2023-01-05 RX ADMIN — GABAPENTIN 600 MG: 300 CAPSULE ORAL at 08:30

## 2023-01-05 RX ADMIN — ROPINIROLE HYDROCHLORIDE 0.25 MG: 0.25 TABLET, FILM COATED ORAL at 02:49

## 2023-01-05 RX ADMIN — METOPROLOL SUCCINATE 50 MG: 50 TABLET, EXTENDED RELEASE ORAL at 08:30

## 2023-01-05 RX ADMIN — METOCLOPRAMIDE 5 MG: 5 TABLET ORAL at 08:30

## 2023-01-05 RX ADMIN — HYDROCODONE BITARTRATE AND ACETAMINOPHEN 1 TABLET: 7.5; 325 TABLET ORAL at 20:42

## 2023-01-05 RX ADMIN — ATORVASTATIN CALCIUM 10 MG: 10 TABLET, FILM COATED ORAL at 20:41

## 2023-01-05 RX ADMIN — SILDENAFIL CITRATE 20 MG: 20 TABLET ORAL at 13:59

## 2023-01-05 RX ADMIN — METOCLOPRAMIDE 5 MG: 5 TABLET ORAL at 17:30

## 2023-01-05 RX ADMIN — GABAPENTIN 600 MG: 300 CAPSULE ORAL at 11:30

## 2023-01-05 RX ADMIN — SILDENAFIL CITRATE 20 MG: 20 TABLET ORAL at 21:10

## 2023-01-05 RX ADMIN — POLYETHYLENE GLYCOL 3350 17 G: 17 POWDER, FOR SOLUTION ORAL at 13:59

## 2023-01-05 RX ADMIN — APIXABAN 5 MG: 5 TABLET, FILM COATED ORAL at 20:41

## 2023-01-05 RX ADMIN — LEVOTHYROXINE SODIUM 88 MCG: 0.09 TABLET ORAL at 05:17

## 2023-01-05 RX ADMIN — DILTIAZEM HYDROCHLORIDE 15 MG/HR: 5 INJECTION, SOLUTION INTRAVENOUS at 02:40

## 2023-01-05 RX ADMIN — Medication 10 ML: at 20:40

## 2023-01-05 RX ADMIN — APIXABAN 5 MG: 5 TABLET, FILM COATED ORAL at 08:30

## 2023-01-05 RX ADMIN — FAMOTIDINE 20 MG: 20 TABLET, FILM COATED ORAL at 08:30

## 2023-01-05 RX ADMIN — SERTRALINE 50 MG: 50 TABLET, FILM COATED ORAL at 20:41

## 2023-01-05 RX ADMIN — ONDANSETRON HYDROCHLORIDE 4 MG: 4 TABLET, FILM COATED ORAL at 13:05

## 2023-01-05 RX ADMIN — HYDROCODONE BITARTRATE AND ACETAMINOPHEN 1 TABLET: 7.5; 325 TABLET ORAL at 14:31

## 2023-01-05 NOTE — CASE MANAGEMENT/SOCIAL WORK
Social Work Assessment  [facility]     Patient Name: Catalina Moreno  MRN: [id number]  Today's Date: 1/5/2023    Admit Date: 1/4/2023       Discharge Plan     Row Name 01/05/23 1554       Plan    Plan Comments LSW met with patient at bedside. Patient reported she lives alone and does not have any family/community support (patient has 2 adult son's but does not have any communication with them). Patient expressed wanting to sell her house and live in assisted-living facility or SNF if deemed appropriate after PT/OT evaluations. Patient agreeable to LSW making referral for in-home supports, transportation issues, and Medicaid eligibility assistance. LSW submitted [facility] referral.                    Met with patient in room wearing PPE: mask.    Maintained distance greater than six feet and spent less than 15 minutes in the room.    KATY Lerner, MSW    Phone: [phone]  Cell: [phone]  Fax: [phone]  [email or web address]

## 2023-01-05 NOTE — CASE MANAGEMENT/SOCIAL WORK
Discharge Planning Assessment  [facility]     Patient Name: Catalina Moreno  MRN: [id number]  Today's Date: 1/5/2023    Admit Date: 1/4/2023    Plan: DC PLAN: From routine home. Pending PT/OT eval   Discharge Needs Assessment     Row Name 01/05/23 1146       Living Environment    People in Home alone    Current Living Arrangements home    Primary Care Provided by self    Family Caregiver if Needed none    Able to Return to Prior Arrangements yes       Resource/Environmental Concerns    Resource/Environmental Concerns none       Transition Planning    Patient/Family Anticipates Transition to home       Discharge Needs Assessment    Equipment Currently Used at Home walker, standard               Discharge Plan     Row Name 01/05/23 1147       Plan    Plan DC PLAN: From routine home. Pending PT/OT eval    Patient/Family in Agreement with Plan yes    Plan Comments Met with patient at bedside, from routine home alone. Uses cane, walker from time to time. Independent with ADL's PCP is Dr. Phelps. Pharmacy is [facility] in [place]. Able to afford medications, Has trouble with transportation to and from doctor appointments. MSW made aware, will provide information on [employer] transportation. Denies any other concerns about returning home. DC BARRIERS: Cardiology consult, Cardizem gtt, Pending PT/OT eval.              Continued Care and Services - Admitted Since 1/4/2023    Coordination has not been started for this encounter.       Expected Discharge Date and Time     Expected Discharge Date Expected Discharge Time    Jan 6, 2023          Demographic Summary     Row Name 01/05/23 1145       General Information    Admission Type inpatient    Arrived From emergency department    Referral Source admission list    Reason for Consult discharge planning    Preferred Language English       Contact Information    Permission Granted to Share Info With     Contact Information Obtained for                Functional  Status     Row Name 01/05/23 1145       Functional Status    Usual Activity Tolerance moderate    Current Activity Tolerance moderate       Assessment of Health Literacy    How often do you have someone help you read hospital materials? Sometimes    How often do you have problems learning about your medical condition because of difficulty understanding written information? Sometimes    How often do you have a problem understanding what is told to you about your medical condition? Sometimes    How confident are you filling out medical forms by yourself? Somewhat    Health Literacy Moderate       Functional Status, IADL    Medications assistive equipment    Meal Preparation assistive equipment    Housekeeping assistive equipment    Laundry independent    Shopping independent       Mental Status    General Appearance WDL WDL                 Patient Forms     Row Name 01/05/23 1146       Patient Forms    Important Message from Medicare (IMM) Delivered  IMM 1/4/23 per registration    Delivered to Patient    Method of delivery In person              Met with patient in room wearing PPE: mask,        Maintained distance greater than six feet and spent less than 15 minutes in the room.      Dipti Gonzalez RN     Office phone: [phone]  Cell phone: [phone]

## 2023-01-05 NOTE — PROGRESS NOTES
[facility] Medicine Services Daily Progress Note    Patient Name: Catalina Moreno  : 1940  MRN: [id number]  Primary Care Physician:  Rubio Dover MD  Date of admission: 2023      Subjective      Chief Complaint: Tachyarrhythmia nausea      Patient Reports she is feeling nauseous.  States this has been going on for months.  Laxatives ordered.  No BM x2 days    ROS negative except as above.      Objective      Vitals:   Temp:  [97.3 °F (36.3 °C)-98.4 °F (36.9 °C)] 97.7 °F (36.5 °C)  Heart Rate:  [] 78  Resp:  [10-20] 10  BP: ()/(48-96) 120/56  Flow (L/min):  [3-4] 3    Physical Exam  Vitals reviewed.   Constitutional:       Comments: Nurse at bedside  Patient wearing nasal cannula   HENT:      Head: Normocephalic.      Nose: Nose normal.      Mouth/Throat:      Mouth: Mucous membranes are moist.   Cardiovascular:      Rate and Rhythm: Tachycardia present. Rhythm irregular.      Pulses: Normal pulses.      Heart sounds: Normal heart sounds.   Pulmonary:      Effort: Pulmonary effort is normal.      Breath sounds: Normal breath sounds.   Abdominal:      General: Abdomen is flat.      Palpations: Abdomen is soft.      Comments: Fullness in abdomen but not tender   Musculoskeletal:         General: Normal range of motion.      Cervical back: Normal range of motion.   Skin:     General: Skin is warm.   Neurological:      General: No focal deficit present.      Mental Status: She is alert and oriented to person, place, and time.   Psychiatric:         Mood and Affect: Mood normal.         Behavior: Behavior normal.             Result Review    Result Review:  I have personally reviewed the results from the time of this admission to 2023 18:00 EST and agree with these findings:  [x]  Laboratory  []  Microbiology  []  Radiology  []  EKG/Telemetry   []  Cardiology/Vascular   []  Pathology  []  Old records  []  Other:  Most notable findings include: Creatinine  1.11        Assessment & Plan      Brief Patient Summary:  Catalina Moreno is a 82 y.o. female who presents with A. fib with RVR      apixaban, 5 mg, Oral, Q12H  atorvastatin, 10 mg, Oral, Nightly  digoxin, 125 mcg, Oral, Daily  famotidine, 20 mg, Oral, Daily  gabapentin, 600 mg, Oral, 4x Daily  levothyroxine, 88 mcg, Oral, Daily  metoclopramide, 5 mg, Oral, TID AC  metoprolol succinate XL, 50 mg, Oral, Q24H  pantoprazole, 40 mg, Oral, Daily With Lunch  polyethylene glycol, 17 g, Oral, Daily  rOPINIRole, 0.25 mg, Oral, Nightly  sertraline, 50 mg, Oral, Nightly  sildenafil, 20 mg, Oral, Q8H  sodium chloride, 10 mL, Intravenous, Q12H  cyanocobalamin, 1,000 mcg, Oral, Daily             Active Hospital Problems:  Active Hospital Problems    Diagnosis    • **Atrial fibrillation with rapid ventricular response (HCC)    • Chronic diastolic CHF (congestive heart failure) (HCC)    • Generalized anxiety disorder    • Restless legs syndrome    • Stage 2 chronic kidney disease    • Pulmonary hypertension (HCC)    • Chronic low back pain    • Depressive disorder    • Primary fibromyalgia syndrome    • Gastroesophageal reflux disease    • Essential hypertension    • Hypothyroidism      Plan:      Atrial fibrillation with rapid ventricular response  -chest x-ray showed stable cardiomegaly; elevated right hemidiaphragm; no acute process in the chest  -EKG showed A. fib with rate of 153  -Digoxin level 0.3  -Cardizem drip  -Echo ordered  -Cardiology consulted  -Cardizem drip weaned off     Chronic diastolic CHF (congestive heart failure)   -proBNP 9 9074, baseline 2469  -EF 45% on 3/5/2022  -Daily weights  -2000 mL p.o. fluid restriction  -Strict intake and output     Chronic low back pain  Primary fibromyalgia syndrome     Essential hypertension  -BP controlled     Pulmonary hypertension     Stage 2 chronic kidney disease  -Creatinine 1.19, baseline 0.91  -GFR 46, baseline 66.1     Restless legs syndrome     Depressive  disorder  Generalized anxiety disorder  -Stable     Gastroesophageal reflux disease    Constipation  MiraLAX ordered     Hypothyroidism     DVT prophylaxis:  Medical DVT prophylaxis orders are present.     CODE STATUS:    Code Status (Patient has no pulse and is not breathing): CPR (Attempt to Resuscitate)  Medical Interventions (Patient has pulse or is breathing): Full Support     Admission Status:  I believe this patient meets inpatient status.     I discussed the patient's findings and my recommendations with patient.     This patient has been examined wearing appropriate Personal Protective Equipment 01/05/23      Electronically signed by Dom Murray MD, 01/05/23, 18:00 EST.  [facility]ist Team

## 2023-01-05 NOTE — PLAN OF CARE
Problem: Adult Inpatient Plan of Care  Goal: Plan of Care Review  Outcome: Ongoing, Progressing  Goal: Patient-Specific Goal (Individualized)  Outcome: Ongoing, Progressing  Goal: Absence of Hospital-Acquired Illness or Injury  Outcome: Ongoing, Progressing  Intervention: Identify and Manage Fall Risk  Recent Flowsheet Documentation  Taken 1/5/2023 0200 by Elder Vasquez RN  Safety Promotion/Fall Prevention:   assistive device/personal items within reach   clutter free environment maintained   fall prevention program maintained   safety round/check completed  Taken 1/5/2023 0140 by Elder Vasquez RN  Safety Promotion/Fall Prevention:   assistive device/personal items within reach   clutter free environment maintained   fall prevention program maintained   lighting adjusted   safety round/check completed  Intervention: Prevent Skin Injury  Recent Flowsheet Documentation  Taken 1/5/2023 0140 by Elder Vasquez RN  Skin Protection: adhesive use limited  Intervention: Prevent and Manage VTE (Venous Thromboembolism) Risk  Recent Flowsheet Documentation  Taken 1/5/2023 0140 by Elder Vasquez RN  Range of Motion: active ROM (range of motion) encouraged  Intervention: Prevent Infection  Recent Flowsheet Documentation  Taken 1/5/2023 0200 by Elder Vasquez RN  Infection Prevention:   rest/sleep promoted   hand hygiene promoted  Goal: Optimal Comfort and Wellbeing  Outcome: Ongoing, Progressing  Intervention: Monitor Pain and Promote Comfort  Recent Flowsheet Documentation  Taken 1/5/2023 0140 by Elder Vasquez RN  Pain Management Interventions: position adjusted  Intervention: Provide Person-Centered Care  Recent Flowsheet Documentation  Taken 1/5/2023 0140 by Elder Vasquez RN  Trust Relationship/Rapport:   care explained   questions encouraged  Goal: Readiness for Transition of Care  Outcome: Ongoing, Progressing  Intervention: Mutually Develop Transition Plan  Recent Flowsheet Documentation  Taken  1/5/2023 0133 by Elder Vasquez, RN  Transportation Anticipated: (family will not be able to pick, planned to use a taxi back home)   public transportation   other (see comments)  Patient/Family Anticipated Services at Transition: home health care  Patient/Family Anticipates Transition to: home  Taken 1/5/2023 0131 by Elder Vasquez, RN  Equipment Currently Used at Home: walker, standard     Problem: Fall Injury Risk  Goal: Absence of Fall and Fall-Related Injury  Outcome: Ongoing, Progressing  Intervention: Promote Injury-Free Environment  Recent Flowsheet Documentation  Taken 1/5/2023 0200 by Elder Vasquez, RN  Safety Promotion/Fall Prevention:   assistive device/personal items within reach   clutter free environment maintained   fall prevention program maintained   safety round/check completed  Taken 1/5/2023 0140 by Elder Vasquez RN  Safety Promotion/Fall Prevention:   assistive device/personal items within reach   clutter free environment maintained   fall prevention program maintained   lighting adjusted   safety round/check completed     Problem: Pain Acute  Goal: Acceptable Pain Control and Functional Ability  Outcome: Ongoing, Progressing  Intervention: Develop Pain Management Plan  Recent Flowsheet Documentation  Taken 1/5/2023 0140 by Elder Vasquez, RN  Pain Management Interventions: position adjusted  Intervention: Optimize Psychosocial Wellbeing  Recent Flowsheet Documentation  Taken 1/5/2023 0140 by Elder Vasquez, RN  Diversional Activities: television     Problem: Dysrhythmia  Goal: Normalized Cardiac Rhythm  Outcome: Ongoing, Progressing   Goal Outcome Evaluation:

## 2023-01-05 NOTE — H&P
[facility] Medicine Services  History & Physical    Patient Name: Catalina Moreno  : 1940  MRN: [id number]  Primary Care Physician:  Rubio Dover MD  Date of admission: 2023  Date and Time of Service: 2023 at 2030    Subjective      Chief Complaint: Nausea    History of Present Illness: Catalina Moreno is a 82 y.o. female with past medical history of A. fib, CHF, hypertension, CKD, anxiety who presented to [facility] on 2023 complaining of nausea started a few months ago.  No aggravating or alleviating factors.  She denies vomiting, diarrhea, fever, chills, dysuria.  Patient complains of mild generalized abdominal pain that remains constant.  Patient states the abdominal pain is r/t decreased p.o. intake.  Patient denies palpitations, chest pain.  She complains of intermittent shortness of air, fatigue, nocturnal dyspnea.  Patient's cardiologist is Dr. Gonzalez.  Patient stated she has been out of her home Eliquis for about 1 month.    In the ED, chest x-ray showed stable cardiomegaly; elevated right hemidiaphragm; no acute process in the chest.  EKG showed A. fib with rate of 153.  All labs unremarkable except proBNP 9074, digoxin level 0.3, bilirubin 2.0, creatinine 1.19, hemoglobin 10.5, hematocrit 33.7.  All vital signs unremarkable except pulse 158, /103.  Patient received Cardizem bolus and drip, hydrocodone in the ED.  Patient admitted to hospitalist service for further evaluation and treatment.    Review of Systems   Constitutional: Positive for malaise/fatigue. Negative for chills and fever.   HENT: Negative.    Eyes: Negative.    Cardiovascular: Negative.    Respiratory: Positive for cough and shortness of breath. Negative for sputum production.    Endocrine: Negative.    Skin: Negative.    Musculoskeletal: Negative.    Gastrointestinal: Positive for abdominal pain and nausea. Negative for constipation, diarrhea and vomiting.   Genitourinary:  Negative.  Negative for dysuria.   Neurological: Negative.    Psychiatric/Behavioral: Negative.    Allergic/Immunologic: Negative.         Personal History     Past Medical History:   Diagnosis Date   • Acute kidney injury (HCC) 7/22/2022   • Anxiety    • Arthritis    • Atrial fibrillation (HCC)     paroxysmal   • Broken shoulder     left-- due to fall 11-7-19 was at [facility]   • Buttock pain     rt side   • DDD (degenerative disc disease), lumbar    • Depression    • Edema     9/2020 foot   • GERD (gastroesophageal reflux disease)    • H/O fall    • Hip pain     rt   • Hypertension    • Hypothyroidism    • Insomnia    • Irregular heart beat    • Leg pain     rt   • Low back pain    • Neuropathy    • Pain in both feet    • PONV (postoperative nausea and vomiting)    • Pulmonary hypertension (HCC)    • Radiculopathy    • Restless legs    • Spondylolisthesis    • Urinary incontinence        Past Surgical History:   Procedure Laterality Date   • BACK SURGERY  07/19/2018    PDC &  PSF L3-L5 insitu   • CARDIAC CATHETERIZATION N/A 4/2/2021    Procedure: Cardiac Catheterization/Vascular Study;  Surgeon: Barrett Evans MD;  Location: [facility] INVASIVE LOCATION;  Service: Cardiovascular;  Laterality: N/A;   • CHOLECYSTECTOMY     • COLONOSCOPY     • HYSTERECTOMY     • ROTATOR CUFF REPAIR Left        Family History: family history includes Cancer in her father, paternal aunt, and paternal uncle; Diabetes in her son; Heart disease in her paternal aunt. Otherwise pertinent FHx was reviewed and not pertinent to current issue.    Social History:  reports that she has never smoked. She has never used smokeless tobacco. She reports that she does not drink alcohol and does not use drugs.    Home Medications:  Prior to Admission Medications     Prescriptions Last Dose Informant Patient Reported? Taking?    apixaban (ELIQUIS) 5 MG tablet tablet   No No    Take 1 tablet by mouth Every 12 (Twelve) Hours. Indications: Atrial  Fibrillation    atorvastatin (LIPITOR) 10 MG tablet   No No    Take 1 tablet by mouth Every Night.    digoxin (LANOXIN) 125 MCG tablet   No No    Take 1 tablet by mouth Daily.    Patient taking differently:  Take 125 mcg by mouth Daily. Has never been called in Dr. Zuniga office notifed x 2 of med not in pts home. 9/21    dilTIAZem CD (CARDIZEM CD) 120 MG 24 hr capsule   No No    Take 1 capsule by mouth Daily.    famotidine (PEPCID) 20 MG tablet   Yes No    Take 20 mg by mouth Daily. Indications: Heartburn    gabapentin (NEURONTIN) 600 MG tablet   No No    TAKE 1 TABLET BY MOUTH FOUR TIMES DAILY    HYDROcodone-acetaminophen (NORCO) 7.5-325 MG per tablet   No No    Take 1 tablet by mouth 4 (Four) Times a Day As Needed for Severe Pain. DNF before 11/2/2022    HYDROcodone-acetaminophen (NORCO) 7.5-325 MG per tablet   No No    Take 1 tablet by mouth 4 (Four) Times a Day As Needed for Severe Pain.    levothyroxine (SYNTHROID, LEVOTHROID) 88 MCG tablet   Yes No    Take 88 mcg by mouth Daily. Indications: Underactive Thyroid    lidocaine (LIDODERM) 5 %   No No    UNWRAP AND APPLY 2 PATCH(ES) ON THE SKIN DAILY AS DIRECTED BY PROVIDER. REMOVE AND DISCARD PATCH(ES) WITHIN 12 HOURS OR AS DIRECTED BY MD    loperamide (IMODIUM A-D) 2 MG tablet   Yes No    Take 2 mg by mouth 4 (Four) Times a Day As Needed for Diarrhea. Indications: Diarrhea    metoprolol succinate XL (TOPROL-XL) 50 MG 24 hr tablet   Yes No    pantoprazole (PROTONIX) 40 MG EC tablet   Yes No    Take 40 mg by mouth Daily With Lunch. Afternoons  Indications: Gastroesophageal Reflux Disease    rOPINIRole (REQUIP) 0.25 MG tablet   No No    Take 1 tablet by mouth Every Night. Take 1 hour before bedtime    sertraline (ZOLOFT) 50 MG tablet   Yes No    Take 50 mg by mouth Every Night. Indications: Major Depressive Disorder    sildenafil (REVATIO) 20 MG tablet   Yes No    Indications: Pulmonary Arterial Hypertension    tiZANidine (ZANAFLEX) 4 MG tablet   Yes No    Take 4  mg by mouth 2 (Two) Times a Day As Needed. Indications: Musculoskeletal Pain    vitamin B-12 (VITAMIN B-12) 1000 MCG tablet   No No    Take 1 tablet by mouth Daily.            Allergies:  Allergies   Allergen Reactions   • Baclofen Rash   • Codeine Nausea Only   • Ibuprofen Unknown (See Comments)     Patient doesn't know----Motin   • Methocarbamol Unknown (See Comments)     Patient doesn't know   • Naproxen Unknown (See Comments)     Pt. Doesn't know    • Tizanidine Hcl Other (See Comments)     Syncope    • Tolmetin Dizziness     Pt. Doesn't know-- same as Tolectin       Objective      Vitals:   Temp:  [98.9 °F (37.2 °C)] 98.9 °F (37.2 °C)  Heart Rate:  [] 119  Resp:  [18] 18  BP: (143-146)/() 146/90    Physical Exam  Vitals and nursing note reviewed.   Constitutional:       Appearance: Normal appearance.   HENT:      Head: Normocephalic.      Right Ear: External ear normal.      Left Ear: External ear normal.      Nose: Nose normal.      Mouth/Throat:      Pharynx: Oropharynx is clear.   Eyes:      Extraocular Movements: Extraocular movements intact.   Cardiovascular:      Rate and Rhythm: Tachycardia present. Rhythm irregular.      Pulses: Normal pulses.      Heart sounds: Normal heart sounds.   Pulmonary:      Effort: Pulmonary effort is normal.      Breath sounds: Normal breath sounds.   Abdominal:      Palpations: Abdomen is soft.      Comments: Decreased bowel sounds throughout   Musculoskeletal:         General: Normal range of motion.      Cervical back: Normal range of motion.      Right lower leg: Edema present.      Left lower leg: Edema present.      Comments: Slight BLE edema   Skin:     General: Skin is warm and dry.   Neurological:      Mental Status: She is alert and oriented to person, place, and time.   Psychiatric:         Mood and Affect: Mood normal.         Behavior: Behavior normal.          Result Review    Result Review:  I have personally reviewed the results from the time of  this admission to 1/4/2023 20:25 EST and agree with these findings:  [x]  Laboratory  []  Microbiology  [x]  Radiology  [x]  EKG/Telemetry   [x]  Cardiology/Vascular   []  Pathology  []  Old records  []  Other:  Most notable findings include: as above      Assessment & Plan        Active Hospital Problems:  Active Hospital Problems    Diagnosis    • **Atrial fibrillation with rapid ventricular response (HCC)    • Chronic diastolic CHF (congestive heart failure) (HCC)    • Generalized anxiety disorder    • Restless legs syndrome    • Stage 2 chronic kidney disease    • Pulmonary hypertension (HCC)    • Chronic low back pain    • Depressive disorder    • Primary fibromyalgia syndrome    • Gastroesophageal reflux disease    • Essential hypertension    • Hypothyroidism      Plan:     Atrial fibrillation with rapid ventricular response  -chest x-ray showed stable cardiomegaly; elevated right hemidiaphragm; no acute process in the chest  -EKG showed A. fib with rate of 153  -Digoxin level 0.3  -Cardizem drip  -Echo ordered  -Cardiology consult    Chronic diastolic CHF (congestive heart failure)   -proBNP 9 9074, baseline 2469  -EF 45% on 3/5/2022  -Daily weights  -2000 mL p.o. fluid restriction  -Strict intake and output    Chronic low back pain  Primary fibromyalgia syndrome    Essential hypertension  -BP controlled    Pulmonary hypertension    Stage 2 chronic kidney disease  -Creatinine 1.19, baseline 0.91  -GFR 46, baseline 66.1    Restless legs syndrome    Depressive disorder  Generalized anxiety disorder  -Stable    Gastroesophageal reflux disease    Hypothyroidism    DVT prophylaxis:  Medical DVT prophylaxis orders are present.    CODE STATUS:    Code Status (Patient has no pulse and is not breathing): CPR (Attempt to Resuscitate)  Medical Interventions (Patient has pulse or is breathing): Full Support    Admission Status:  I believe this patient meets inpatient status.    I discussed the patient's findings and my  recommendations with patient.    This patient has been examined wearing appropriate Personal Protective Equipment 01/04/23      Signature: Electronically signed by KALYANI Okeefe, 01/04/23, 20:25 EST.  [facility] Hospitalist Team

## 2023-01-05 NOTE — PLAN OF CARE
Goal Outcome Evaluation:      Will continue to monitor BP and HR as pt has been d/c off cardizem drip. Pt appears confused hourly but is redirectable and can be reoriented as well. She continue to takes off cannula and destats quickly into the 70's. Suggested a sitter to charge nurse. Pt does eat, but doesn't realize her food has been delivered nor what time of day it is, will continue to reorient. Will continue to monitor pain for pt's restless leg syndrome. She said it's hereditary and her sister has it and she's had pain shots to control hers since she was 9 or 10.

## 2023-01-05 NOTE — ED PROVIDER NOTES
Subjective   History of Present Illness  82-year-old female presents with stomach pain.  She reports she has had this for couple of months she has had nausea.  She has had some cough for about a month.  She reports no vomiting no diarrhea no urinary difficulty.  She complains of some shortness of breath at times.  She complains of sciatica which is chronic.  She has had no fever.  Review of Systems    Past Medical History:   Diagnosis Date   • Acute kidney injury (HCC) 7/22/2022   • Anxiety    • Arthritis    • Atrial fibrillation (HCC)     paroxysmal   • Broken shoulder     left-- due to fall 11-7-19 was at [facility]   • Buttock pain     rt side   • DDD (degenerative disc disease), lumbar    • Depression    • Edema     9/2020 foot   • GERD (gastroesophageal reflux disease)    • H/O fall    • Hip pain     rt   • Hypertension    • Hypothyroidism    • Insomnia    • Irregular heart beat    • Leg pain     rt   • Low back pain    • Neuropathy    • Pain in both feet    • PONV (postoperative nausea and vomiting)    • Pulmonary hypertension (HCC)    • Radiculopathy    • Restless legs    • Spondylolisthesis    • Urinary incontinence        Allergies   Allergen Reactions   • Baclofen Rash   • Codeine Nausea Only   • Ibuprofen Unknown (See Comments)     Patient doesn't know----Motin   • Methocarbamol Unknown (See Comments)     Patient doesn't know   • Naproxen Unknown (See Comments)     Pt. Doesn't know    • Tizanidine Hcl Other (See Comments)     Syncope    • Tolmetin Dizziness     Pt. Doesn't know-- same as Tolectin       Past Surgical History:   Procedure Laterality Date   • BACK SURGERY  07/19/2018    PDC &  PSF L3-L5 insitu   • CARDIAC CATHETERIZATION N/A 4/2/2021    Procedure: Cardiac Catheterization/Vascular Study;  Surgeon: Barrett Evans MD;  Location: [facility] INVASIVE LOCATION;  Service: Cardiovascular;  Laterality: N/A;   • CHOLECYSTECTOMY     • COLONOSCOPY     • HYSTERECTOMY     • ROTATOR CUFF REPAIR  Left        Family History   Problem Relation Age of Onset   • Cancer Father    • Diabetes Son    • Cancer Paternal Aunt    • Heart disease Paternal Aunt    • Cancer Paternal Uncle        Social History     Socioeconomic History   • Marital status:    Tobacco Use   • Smoking status: Never   • Smokeless tobacco: Never   Vaping Use   • Vaping Use: Never used   Substance and Sexual Activity   • Alcohol use: No   • Drug use: No   • Sexual activity: Defer     Prior to Admission medications    Medication Sig Start Date End Date Taking? Authorizing Provider   apixaban (ELIQUIS) 5 MG tablet tablet Take 1 tablet by mouth Every 12 (Twelve) Hours. Indications: Atrial Fibrillation 9/2/22   Dom Murray MD   atorvastatin (LIPITOR) 10 MG tablet Take 1 tablet by mouth Every Night. 8/11/22   Flash Gonzalez MD   digoxin (LANOXIN) 125 MCG tablet Take 1 tablet by mouth Daily.  Patient taking differently: Take 125 mcg by mouth Daily. Has never been called in Dr. Zuniga office notifed x 2 of med not in pts home. 9/21 9/14/22   Flash Gonzalez MD   dilTIAZem CD (CARDIZEM CD) 120 MG 24 hr capsule Take 1 capsule by mouth Daily. 9/3/22   Dom Murray MD   famotidine (PEPCID) 20 MG tablet Take 20 mg by mouth Daily. Indications: Heartburn 6/19/19   ProviderElton MD   gabapentin (NEURONTIN) 600 MG tablet TAKE 1 TABLET BY MOUTH FOUR TIMES DAILY 11/29/22   Shefali Worthy MD   HYDROcodone-acetaminophen (NORCO) 7.5-325 MG per tablet Take 1 tablet by mouth 4 (Four) Times a Day As Needed for Severe Pain. DNF before 11/2/2022 11/2/22   Shefali Worthy MD   HYDROcodone-acetaminophen (NORCO) 7.5-325 MG per tablet Take 1 tablet by mouth 4 (Four) Times a Day As Needed for Severe Pain. 10/4/22   Shefali Worthy MD   levothyroxine (SYNTHROID, LEVOTHROID) 88 MCG tablet Take 88 mcg by mouth Daily. Indications: Underactive Thyroid    ProviderElton MD   lidocaine (LIDODERM) 5 % UNWRAP AND APPLY 2 PATCH(ES) ON THE SKIN  DAILY AS DIRECTED BY PROVIDER. REMOVE AND DISCARD PATCH(ES) WITHIN 12 HOURS OR AS DIRECTED BY MD 7/26/22   Godfrey Ling MD   loperamide (IMODIUM A-D) 2 MG tablet Take 2 mg by mouth 4 (Four) Times a Day As Needed for Diarrhea. Indications: Diarrhea 8/29/22   Elton Flores MD   metoprolol succinate XL (TOPROL-XL) 50 MG 24 hr tablet     Elton Flores MD   pantoprazole (PROTONIX) 40 MG EC tablet Take 40 mg by mouth Daily With Lunch. Afternoons  Indications: Gastroesophageal Reflux Disease 6/24/19   Elton Flores MD   rOPINIRole (REQUIP) 0.25 MG tablet Take 1 tablet by mouth Every Night. Take 1 hour before bedtime 10/4/22   Shefali Worthy MD   sertraline (ZOLOFT) 50 MG tablet Take 50 mg by mouth Every Night. Indications: Major Depressive Disorder 6/4/20   Elton Flores MD   sildenafil (REVATIO) 20 MG tablet Indications: Pulmonary Arterial Hypertension 9/23/22   Elton Flores MD   tiZANidine (ZANAFLEX) 4 MG tablet Take 4 mg by mouth 2 (Two) Times a Day As Needed. Indications: Musculoskeletal Pain 9/13/22   Elton Flores MD   vitamin B-12 (VITAMIN B-12) 1000 MCG tablet Take 1 tablet by mouth Daily. 11/17/21   Shahla Cardenas MD           Objective   Physical Exam  82-year-old female awake alert.  Generally somewhat chronically on appearance.  Pupils equal round react light.  Oropharynx mucous memories moist neck supple chest clear cardiovascular rapid irregular regular rhythm.  Abdomen soft nontender.  Extremities without tenderness or edema.  Neurologic exam without focal findings noted.  Procedures           ED Course      Results for orders placed or performed during the hospital encounter of 01/04/23   Comprehensive Metabolic Panel    Specimen: Blood   Result Value Ref Range    Glucose 104 (H) 65 - 99 mg/dL    BUN 18 8 - 23 mg/dL    Creatinine 1.19 (H) 0.57 - 1.00 mg/dL    Sodium 140 136 - 145 mmol/L    Potassium 4.0 3.5 - 5.2 mmol/L    Chloride 105 98 -  107 mmol/L    CO2 20.0 (L) 22.0 - 29.0 mmol/L    Calcium 8.7 8.6 - 10.5 mg/dL    Total Protein 6.8 6.0 - 8.5 g/dL    Albumin 4.1 3.5 - 5.2 g/dL    ALT (SGPT) 19 1 - 33 U/L    AST (SGOT) 39 (H) 1 - 32 U/L    Alkaline Phosphatase 85 39 - 117 U/L    Total Bilirubin 2.0 (H) 0.0 - 1.2 mg/dL    Globulin 2.7 gm/dL    A/G Ratio 1.5 g/dL    BUN/Creatinine Ratio 15.1 7.0 - 25.0    Anion Gap 15.0 5.0 - 15.0 mmol/L    eGFR 46.0 (L) >60.0 mL/min/1.73   BNP    Specimen: Blood   Result Value Ref Range    proBNP 9,074.0 (H) 0.0 - 1,800.0 pg/mL   Troponin    Specimen: Blood   Result Value Ref Range    Troponin T <0.010 0.000 - 0.030 ng/mL   Digoxin Level    Specimen: Blood   Result Value Ref Range    Digoxin 0.30 (L) 0.60 - 1.20 ng/mL   CBC Auto Differential    Specimen: Blood   Result Value Ref Range    WBC 5.70 3.40 - 10.80 10*3/mm3    RBC 3.93 3.77 - 5.28 10*6/mm3    Hemoglobin 10.5 (L) 12.0 - 15.9 g/dL    Hematocrit 33.7 (L) 34.0 - 46.6 %    MCV 85.7 79.0 - 97.0 fL    MCH 26.7 26.6 - 33.0 pg    MCHC 31.1 (L) 31.5 - 35.7 g/dL    RDW 18.1 (H) 12.3 - 15.4 %    RDW-SD 53.8 37.0 - 54.0 fl    MPV 9.3 6.0 - 12.0 fL    Platelets 201 140 - 450 10*3/mm3    Neutrophil % 49.8 42.7 - 76.0 %    Lymphocyte % 39.9 19.6 - 45.3 %    Monocyte % 7.3 5.0 - 12.0 %    Eosinophil % 1.6 0.3 - 6.2 %    Basophil % 1.4 0.0 - 1.5 %    Neutrophils, Absolute 2.80 1.70 - 7.00 10*3/mm3    Lymphocytes, Absolute 2.30 0.70 - 3.10 10*3/mm3    Monocytes, Absolute 0.40 0.10 - 0.90 10*3/mm3    Eosinophils, Absolute 0.10 0.00 - 0.40 10*3/mm3    Basophils, Absolute 0.10 0.00 - 0.20 10*3/mm3    nRBC 0.1 0.0 - 0.2 /100 WBC   POC Lactate    Specimen: Blood   Result Value Ref Range    Lactate 1.4 0.5 - 2.0 mmol/L   Green Top (Gel)   Result Value Ref Range    Extra Tube HOLD    Lavender Top   Result Value Ref Range    Extra Tube hold for add-on    Gold Top - SST   Result Value Ref Range    Extra Tube Hold for add-ons.    Light Blue Top   Result Value Ref Range    Extra Tube  "Hold for add-ons.      XR Chest 1 View    Result Date: 1/4/2023  Impression: 1. Stable cardiomegaly. 2. Elevated right hemidiaphragm. 3. No acute process in the chest Electronically Signed: Mark Steward  1/4/2023 7:13 PM EST  Workstation ID: [id number]    Medications   dilTIAZem (CARDIZEM) 125 mg in 125 mL sodium chloride  infusion (10 mg/hr Intravenous New Bag 1/4/23 1849)   HYDROcodone-acetaminophen (NORCO) 7.5-325 MG per tablet 1 tablet (has no administration in time range)   dilTIAZem (CARDIZEM) injection 20 mg (20 mg Intravenous Given 1/4/23 1852)     /90   Pulse 119   Temp 98.9 °F (37.2 °C) (Oral)   Resp 18   Ht 160 cm (63\")   Wt 75.8 kg (167 lb)   SpO2 94%   BMI 29.58 kg/m²                                        MDM  Chart review: Patient had office visit with pain management in October of last year for chronic pain syndrome.  She was noted to have refill of cardiac medication in November for persistent atrial fibrillation  Comorbidity: As per past history  Differential: Atrial fibrillation, chronic pain syndrome, congestive heart failure,  My EKG interpretation:  atrial fibrillation with rapid ventricular response rate of 153.  Compared to previous August of last year no significant change  Lab: Digoxin low at 0.3 comprehensive metabolic panel glucose 104 creatinine 1.19 normal ALT AST 39 bilirubin 2.0 BNP elevated at 9074 CBC remarkable hemoglobin of 10.5 troponin negative lactic acid normal.  Radiology: I reviewed chest x-ray there is stable cardiomegaly with mild elevation of right hemidiaphragm noted previously.  Discussion/treatment: Patient IV placed.  Was given Cardizem bolus placed on Cardizem drip.  She was given Norco when she started to complain that she was now having sciatica pain which is some of her chronic pain.  Patient's findings were discussed with her.  She had improvement in her heart rate with treatment.  She is already on anticoagulation for her atrial fibrillation with " Eliquis.  Patient was discussed with the nurse practitioner for the hospitalist will be admitted to their service for continued care.  Patient critical care time of 30 minutes independent of procedure.  Patient may need CT of abdomen to evaluate her abdominal pain but has benign abdominal exam and no significant laboratory abnormality at this time.  Patient was evaluated using appropriate PPE      Final diagnoses:   Atrial fibrillation with rapid ventricular response (HCC)   Generalized abdominal pain       ED Disposition  ED Disposition     ED Disposition   Decision to Admit    Condition   --    Comment   Level of Care: Telemetry [5]   Admitting Physician: MARCO JONES [[id number]]   Bed Request Comments: pcu               No follow-up provider specified.       Medication List      No changes were made to your prescriptions during this visit.          Tommy Greco MD  01/04/23 [id number]

## 2023-01-06 ENCOUNTER — APPOINTMENT (OUTPATIENT)
Dept: GENERAL RADIOLOGY | Facility: HOSPITAL | Age: 83
DRG: 309 | End: 2023-01-06
Payer: MEDICARE

## 2023-01-06 ENCOUNTER — APPOINTMENT (OUTPATIENT)
Dept: CT IMAGING | Facility: HOSPITAL | Age: 83
DRG: 309 | End: 2023-01-06
Payer: MEDICARE

## 2023-01-06 LAB
ALBUMIN SERPL-MCNC: 3.7 G/DL (ref 3.5–5.2)
ALBUMIN/GLOB SERPL: 1.5 G/DL
ALP SERPL-CCNC: 76 U/L (ref 39–117)
ALT SERPL W P-5'-P-CCNC: 15 U/L (ref 1–33)
ANION GAP SERPL CALCULATED.3IONS-SCNC: 10 MMOL/L (ref 5–15)
AST SERPL-CCNC: 25 U/L (ref 1–32)
BASOPHILS # BLD AUTO: 0.1 10*3/MM3 (ref 0–0.2)
BASOPHILS NFR BLD AUTO: 2 % (ref 0–1.5)
BH CV ECHO MEAS - ACS: 1.77 CM
BH CV ECHO MEAS - AI P1/2T: 513 MSEC
BH CV ECHO MEAS - AO MAX PG: 3.3 MMHG
BH CV ECHO MEAS - AO MEAN PG: 1.99 MMHG
BH CV ECHO MEAS - AO ROOT DIAM: 3.2 CM
BH CV ECHO MEAS - AO V2 MAX: 90.5 CM/SEC
BH CV ECHO MEAS - AO V2 VTI: 17 CM
BH CV ECHO MEAS - AVA(I,D): 2.8 CM2
BH CV ECHO MEAS - EDV(CUBED): 80.4 ML
BH CV ECHO MEAS - EDV(MOD-SP4): 46.1 ML
BH CV ECHO MEAS - EF(MOD-SP4): 52.1 %
BH CV ECHO MEAS - ESV(CUBED): 35.6 ML
BH CV ECHO MEAS - ESV(MOD-SP4): 22.1 ML
BH CV ECHO MEAS - FS: 23.8 %
BH CV ECHO MEAS - IVS/LVPW: 1.09 CM
BH CV ECHO MEAS - IVSD: 1.16 CM
BH CV ECHO MEAS - LA DIMENSION: 5 CM
BH CV ECHO MEAS - LV DIASTOLIC VOL/BSA (35-75): 26.1 CM2
BH CV ECHO MEAS - LV MASS(C)D: 165.6 GRAMS
BH CV ECHO MEAS - LV MAX PG: 2.5 MMHG
BH CV ECHO MEAS - LV MEAN PG: 1.71 MMHG
BH CV ECHO MEAS - LV SYSTOLIC VOL/BSA (12-30): 12.5 CM2
BH CV ECHO MEAS - LV V1 MAX: 79.5 CM/SEC
BH CV ECHO MEAS - LV V1 VTI: 17.5 CM
BH CV ECHO MEAS - LVIDD: 4.3 CM
BH CV ECHO MEAS - LVIDS: 3.3 CM
BH CV ECHO MEAS - LVOT AREA: 2.7 CM2
BH CV ECHO MEAS - LVOT DIAM: 1.86 CM
BH CV ECHO MEAS - LVPWD: 1.06 CM
BH CV ECHO MEAS - MV E MAX VEL: 109.5 CM/SEC
BH CV ECHO MEAS - MV MAX PG: 5.3 MMHG
BH CV ECHO MEAS - MV MEAN PG: 1.83 MMHG
BH CV ECHO MEAS - MV V2 VTI: 20.6 CM
BH CV ECHO MEAS - MVA(VTI): 2.32 CM2
BH CV ECHO MEAS - PA ACC TIME: 0.06 SEC
BH CV ECHO MEAS - PA PR(ACCEL): 54.1 MMHG
BH CV ECHO MEAS - PA V2 MAX: 55 CM/SEC
BH CV ECHO MEAS - RAP SYSTOLE: 3 MMHG
BH CV ECHO MEAS - RV MAX PG: 0.73 MMHG
BH CV ECHO MEAS - RV V1 MAX: 42.6 CM/SEC
BH CV ECHO MEAS - RV V1 VTI: 6.3 CM
BH CV ECHO MEAS - RVDD: 2.8 CM
BH CV ECHO MEAS - RVSP: 28.6 MMHG
BH CV ECHO MEAS - SI(MOD-SP4): 13.6 ML/M2
BH CV ECHO MEAS - SV(LVOT): 47.8 ML
BH CV ECHO MEAS - SV(MOD-SP4): 24 ML
BH CV ECHO MEAS - TR MAX PG: 25.6 MMHG
BH CV ECHO MEAS - TR MAX VEL: 252.7 CM/SEC
BILIRUB SERPL-MCNC: 1.1 MG/DL (ref 0–1.2)
BUN SERPL-MCNC: 20 MG/DL (ref 8–23)
BUN/CREAT SERPL: 19.8 (ref 7–25)
CALCIUM SPEC-SCNC: 8.4 MG/DL (ref 8.6–10.5)
CHLORIDE SERPL-SCNC: 108 MMOL/L (ref 98–107)
CO2 SERPL-SCNC: 23 MMOL/L (ref 22–29)
CREAT SERPL-MCNC: 1.01 MG/DL (ref 0.57–1)
DEPRECATED RDW RBC AUTO: 59.5 FL (ref 37–54)
EGFRCR SERPLBLD CKD-EPI 2021: 56 ML/MIN/1.73
EOSINOPHIL # BLD AUTO: 0.3 10*3/MM3 (ref 0–0.4)
EOSINOPHIL NFR BLD AUTO: 5.9 % (ref 0.3–6.2)
ERYTHROCYTE [DISTWIDTH] IN BLOOD BY AUTOMATED COUNT: 18.7 % (ref 12.3–15.4)
GLOBULIN UR ELPH-MCNC: 2.5 GM/DL
GLUCOSE SERPL-MCNC: 93 MG/DL (ref 65–99)
HCT VFR BLD AUTO: 31 % (ref 34–46.6)
HGB BLD-MCNC: 9.8 G/DL (ref 12–15.9)
LYMPHOCYTES # BLD AUTO: 2.2 10*3/MM3 (ref 0.7–3.1)
LYMPHOCYTES NFR BLD AUTO: 42.4 % (ref 19.6–45.3)
MAGNESIUM SERPL-MCNC: 2.1 MG/DL (ref 1.6–2.4)
MAXIMAL PREDICTED HEART RATE: 139 BPM
MCH RBC QN AUTO: 26.7 PG (ref 26.6–33)
MCHC RBC AUTO-ENTMCNC: 31.5 G/DL (ref 31.5–35.7)
MCV RBC AUTO: 84.7 FL (ref 79–97)
MONOCYTES # BLD AUTO: 0.4 10*3/MM3 (ref 0.1–0.9)
MONOCYTES NFR BLD AUTO: 7.8 % (ref 5–12)
NEUTROPHILS NFR BLD AUTO: 2.1 10*3/MM3 (ref 1.7–7)
NEUTROPHILS NFR BLD AUTO: 41.9 % (ref 42.7–76)
NRBC BLD AUTO-RTO: 0.1 /100 WBC (ref 0–0.2)
PHOSPHATE SERPL-MCNC: 3.2 MG/DL (ref 2.5–4.5)
PLATELET # BLD AUTO: 163 10*3/MM3 (ref 140–450)
PMV BLD AUTO: 9.2 FL (ref 6–12)
POTASSIUM SERPL-SCNC: 4.6 MMOL/L (ref 3.5–5.2)
PROT SERPL-MCNC: 6.2 G/DL (ref 6–8.5)
RBC # BLD AUTO: 3.66 10*6/MM3 (ref 3.77–5.28)
SODIUM SERPL-SCNC: 141 MMOL/L (ref 136–145)
STRESS TARGET HR: 118 BPM
WBC NRBC COR # BLD: 5.1 10*3/MM3 (ref 3.4–10.8)

## 2023-01-06 PROCEDURE — 99222 1ST HOSP IP/OBS MODERATE 55: CPT | Performed by: INTERNAL MEDICINE

## 2023-01-06 PROCEDURE — 74018 RADEX ABDOMEN 1 VIEW: CPT

## 2023-01-06 PROCEDURE — 83735 ASSAY OF MAGNESIUM: CPT | Performed by: NURSE PRACTITIONER

## 2023-01-06 PROCEDURE — 84100 ASSAY OF PHOSPHORUS: CPT | Performed by: INTERNAL MEDICINE

## 2023-01-06 PROCEDURE — 80053 COMPREHEN METABOLIC PANEL: CPT | Performed by: INTERNAL MEDICINE

## 2023-01-06 PROCEDURE — 97166 OT EVAL MOD COMPLEX 45 MIN: CPT | Performed by: OCCUPATIONAL THERAPIST

## 2023-01-06 PROCEDURE — 36415 COLL VENOUS BLD VENIPUNCTURE: CPT | Performed by: INTERNAL MEDICINE

## 2023-01-06 PROCEDURE — 74176 CT ABD & PELVIS W/O CONTRAST: CPT

## 2023-01-06 PROCEDURE — 85025 COMPLETE CBC W/AUTO DIFF WBC: CPT | Performed by: NURSE PRACTITIONER

## 2023-01-06 PROCEDURE — 97162 PT EVAL MOD COMPLEX 30 MIN: CPT

## 2023-01-06 RX ORDER — CALCIUM GLUCONATE 20 MG/ML
2 INJECTION, SOLUTION INTRAVENOUS AS NEEDED
Status: DISCONTINUED | OUTPATIENT
Start: 2023-01-06 | End: 2023-01-10 | Stop reason: HOSPADM

## 2023-01-06 RX ORDER — TIZANIDINE 2 MG/1
2 TABLET ORAL EVERY 12 HOURS PRN
Status: DISCONTINUED | OUTPATIENT
Start: 2023-01-06 | End: 2023-01-10 | Stop reason: HOSPADM

## 2023-01-06 RX ORDER — CALCIUM GLUCONATE 20 MG/ML
1 INJECTION, SOLUTION INTRAVENOUS AS NEEDED
Status: DISCONTINUED | OUTPATIENT
Start: 2023-01-06 | End: 2023-01-10 | Stop reason: HOSPADM

## 2023-01-06 RX ORDER — BISACODYL 10 MG
10 SUPPOSITORY, RECTAL RECTAL DAILY
Status: DISCONTINUED | OUTPATIENT
Start: 2023-01-06 | End: 2023-01-10 | Stop reason: HOSPADM

## 2023-01-06 RX ORDER — SORBITOL SOLUTION 70 %
50 SOLUTION, ORAL MISCELLANEOUS ONCE
Status: COMPLETED | OUTPATIENT
Start: 2023-01-06 | End: 2023-01-06

## 2023-01-06 RX ORDER — SORBITOL SOLUTION 70 %
50 SOLUTION, ORAL MISCELLANEOUS ONCE
Status: DISCONTINUED | OUTPATIENT
Start: 2023-01-06 | End: 2023-01-10 | Stop reason: HOSPADM

## 2023-01-06 RX ADMIN — APIXABAN 5 MG: 5 TABLET, FILM COATED ORAL at 08:18

## 2023-01-06 RX ADMIN — ATORVASTATIN CALCIUM 10 MG: 10 TABLET, FILM COATED ORAL at 20:38

## 2023-01-06 RX ADMIN — SILDENAFIL CITRATE 20 MG: 20 TABLET ORAL at 22:15

## 2023-01-06 RX ADMIN — SORBITOL SOLUTION (BULK) 50 ML: 70 SOLUTION at 11:14

## 2023-01-06 RX ADMIN — FAMOTIDINE 20 MG: 20 TABLET, FILM COATED ORAL at 08:18

## 2023-01-06 RX ADMIN — GABAPENTIN 600 MG: 300 CAPSULE ORAL at 08:18

## 2023-01-06 RX ADMIN — HYDROCODONE BITARTRATE AND ACETAMINOPHEN 1 TABLET: 7.5; 325 TABLET ORAL at 19:26

## 2023-01-06 RX ADMIN — PANTOPRAZOLE SODIUM 40 MG: 40 TABLET, DELAYED RELEASE ORAL at 13:20

## 2023-01-06 RX ADMIN — LEVOTHYROXINE SODIUM 88 MCG: 0.09 TABLET ORAL at 05:33

## 2023-01-06 RX ADMIN — HYDROCODONE BITARTRATE AND ACETAMINOPHEN 1 TABLET: 7.5; 325 TABLET ORAL at 06:15

## 2023-01-06 RX ADMIN — CYANOCOBALAMIN TAB 1000 MCG 1000 MCG: 1000 TAB at 08:18

## 2023-01-06 RX ADMIN — TIZANIDINE 4 MG: 4 TABLET ORAL at 05:37

## 2023-01-06 RX ADMIN — Medication 10 ML: at 08:18

## 2023-01-06 RX ADMIN — ROPINIROLE HYDROCHLORIDE 0.25 MG: 0.25 TABLET, FILM COATED ORAL at 20:38

## 2023-01-06 RX ADMIN — SILDENAFIL CITRATE 20 MG: 20 TABLET ORAL at 05:33

## 2023-01-06 RX ADMIN — SILDENAFIL CITRATE 20 MG: 20 TABLET ORAL at 13:20

## 2023-01-06 RX ADMIN — POLYETHYLENE GLYCOL 3350 17 G: 17 POWDER, FOR SOLUTION ORAL at 08:18

## 2023-01-06 RX ADMIN — APIXABAN 5 MG: 5 TABLET, FILM COATED ORAL at 20:37

## 2023-01-06 RX ADMIN — METOCLOPRAMIDE 5 MG: 5 TABLET ORAL at 08:18

## 2023-01-06 RX ADMIN — DIGOXIN 125 MCG: 125 TABLET ORAL at 13:20

## 2023-01-06 RX ADMIN — GABAPENTIN 600 MG: 300 CAPSULE ORAL at 13:20

## 2023-01-06 RX ADMIN — ALUMINUM HYDROXIDE, MAGNESIUM HYDROXIDE, AND DIMETHICONE 15 ML: 400; 400; 40 SUSPENSION ORAL at 22:16

## 2023-01-06 RX ADMIN — BISACODYL 10 MG: 10 SUPPOSITORY RECTAL at 11:14

## 2023-01-06 RX ADMIN — SERTRALINE 50 MG: 50 TABLET, FILM COATED ORAL at 20:41

## 2023-01-06 RX ADMIN — METOCLOPRAMIDE 5 MG: 5 TABLET ORAL at 11:14

## 2023-01-06 RX ADMIN — GABAPENTIN 600 MG: 300 CAPSULE ORAL at 17:25

## 2023-01-06 RX ADMIN — METOPROLOL SUCCINATE 50 MG: 50 TABLET, EXTENDED RELEASE ORAL at 05:44

## 2023-01-06 RX ADMIN — GABAPENTIN 600 MG: 300 CAPSULE ORAL at 20:39

## 2023-01-06 RX ADMIN — Medication 10 ML: at 20:40

## 2023-01-06 NOTE — THERAPY EVALUATION
Patient Name: Catalina Moreno  : 1940    MRN: [id number]                              Today's Date: 2023       Admit Date: 2023    Visit Dx:     ICD-10-CM ICD-9-CM   1. Atrial fibrillation with rapid ventricular response ([facility])  I48.91 427.31   2. Generalized abdominal pain  R10.84 789.07     Patient Active Problem List   Diagnosis   • Arthritis   • Neuropathic pain   • Other long term (current) drug therapy   • Polyarthralgia   • Sacroiliitis ([facility])   • Depressive disorder   • Primary fibromyalgia syndrome   • Gastroesophageal reflux disease   • Essential hypertension   • Lightheadedness   • Hypothyroidism   • Lumbar radiculopathy   • Altered mental status   • Acute on chronic diastolic congestive heart failure ([facility])   • Nonrheumatic tricuspid valve regurgitation   • Atrial fibrillation with RVR ([facility])   • Pulmonary hypertension ([facility])   • Acquired spondylolisthesis of lumbosacral region   • Spondylolisthesis, lumbar region   • Vitamin D deficiency   • Stage 2 chronic kidney disease   • DDD (degenerative disc disease), cervical   • Chronic pain   • Chronic low back pain   • Cervical stenosis of spinal canal   • Anemia   • Polypharmacy   • Age-related cognitive decline   • Edema, unspecified   • Generalized anxiety disorder   • History of falling   • Insomnia, unspecified   • Major depressive disorder, single episode, unspecified   • Need for assistance with personal care   • Other dysphagia   • Polyneuropathy, unspecified   • Unsteadiness on feet   • Restless legs syndrome   • Vomiting, unspecified   • Weakness   • Acute on chronic diastolic (congestive) heart failure ([facility])   • Unspecified abdominal pain   • Paroxysmal atrial fibrillation ([facility])   • Chronic pain disorder   • Other intervertebral disc degeneration, lumbar region   • Essential (primary) hypertension   • Spondylolisthesis, lumbar region   • Hypothyroidism, unspecified   • Gastro-esophageal reflux disease without esophagitis   • Pulmonary  hypertension, unspecified (HCC)   • Chronic diastolic CHF (congestive heart failure) (HCC)   • Congestive heart failure (HCC)   • Acute kidney injury (HCC)   • Atrial fibrillation with rapid ventricular response (HCC)     Past Medical History:   Diagnosis Date   • Acute kidney injury (HCC) 7/22/2022   • Anxiety    • Arthritis    • Atrial fibrillation (HCC)     paroxysmal   • Broken shoulder     left-- due to fall 11-7-19 was at [facility]   • Buttock pain     rt side   • DDD (degenerative disc disease), lumbar    • Depression    • Edema     9/2020 foot   • GERD (gastroesophageal reflux disease)    • H/O fall    • Hip pain     rt   • Hypertension    • Hypothyroidism    • Insomnia    • Irregular heart beat    • Leg pain     rt   • Low back pain    • Neuropathy    • Pain in both feet    • PONV (postoperative nausea and vomiting)    • Pulmonary hypertension (HCC)    • Radiculopathy    • Restless legs    • Spondylolisthesis    • Urinary incontinence      Past Surgical History:   Procedure Laterality Date   • BACK SURGERY  07/19/2018    PDC &  PSF L3-L5 insitu   • CARDIAC CATHETERIZATION N/A 4/2/2021    Procedure: Cardiac Catheterization/Vascular Study;  Surgeon: Barrett Evans MD;  Location: [facility] INVASIVE LOCATION;  Service: Cardiovascular;  Laterality: N/A;   • CHOLECYSTECTOMY     • COLONOSCOPY     • HYSTERECTOMY     • ROTATOR CUFF REPAIR Left       General Information     Row Name 01/06/23 1521          OT Time and Intention    Document Type evaluation  -DT     Mode of Treatment occupational therapy  -DT     Row Name 01/06/23 1521          General Information    Patient Profile Reviewed yes  -DT     Prior Level of Function independent:;all household mobility;ADL's;shopping;driving;community mobility;cleaning;cooking;home management  -DT     Existing Precautions/Restrictions pacemaker;cardiac;fall;oxygen therapy device and L/min  Pt currently has a sitter in her room. Nursing reports pt was confused &  disoriented in the AM.  -DT     Barriers to Rehab cognitive status  Has reported confusion & disorientation @ times.  -DT     Row Name 01/06/23 1521          Living Environment    People in Home alone  Pt reports she has a dog.  -DT     Row Name 01/06/23 1521          Home Main Entrance    Number of Stairs, Main Entrance one  -DT     Row Name 01/06/23 1521          Stairs Within Home, Primary    Number of Stairs, Within Home, Primary none  -DT     Row Name 01/06/23 1521          Cognition    Orientation Status (Cognition) oriented x 4  -DT     Row Name 01/06/23 1521          Safety Issues, Functional Mobility    Safety Issues Affecting Function (Mobility) impulsivity;other (see comments);safety precaution awareness;judgment  intermittent confusion  -DT           User Key  (r) = Recorded By, (t) = Taken By, (c) = Cosigned By    Initials Name Provider Type    DT More Moreno, OT Occupational Therapist                 Mobility/ADL's     Row Name 01/06/23 1523          Bed Mobility    Bed Mobility supine-sit  -DT     Supine-Sit [place] (Bed Mobility) supervision  -DT     Row Name 01/06/23 1523          Bed-Chair Transfer    Bed-Chair [place] (Transfers) contact guard;verbal cues;nonverbal cues (demo/gesture)  -DT     Assistive Device (Bed-Chair Transfers) walker, front-wheeled  -DT     Row Name 01/06/23 1523          Sit-Stand Transfer    Sit-Stand [place] (Transfers) verbal cues;nonverbal cues (demo/gesture);contact guard  -DT     Assistive Device (Sit-Stand Transfers) walker, front-wheeled  -DT     Row Name 01/06/23 1523          Activities of Daily Living    BADL Assessment/Intervention lower body dressing;toileting;grooming;upper body dressing  -DT     Row Name 01/06/23 1523          Lower Body Dressing Assessment/Training    [place] (Lower Body Dressing) lower body dressing skills;contact guard assist  -DT     Row Name 01/06/23 1523          Toileting Assessment/Training     [place] Level (Toileting) toileting skills;contact guard assist  -DT     Row Name 01/06/23 1523          Grooming Assessment/Training    [place] Level (Grooming) grooming skills;set up;supervision  -DT     Row Name 01/06/23 1523          Upper Body Dressing Assessment/Training    [place] Level (Upper Body Dressing) upper body dressing skills;supervision  -DT     Position (Upper Body Dressing) edge of bed sitting  -DT           User Key  (r) = Recorded By, (t) = Taken By, (c) = Cosigned By    Initials Name Provider Type    DT More Moreno, OT Occupational Therapist               Obj/Interventions     Row Name 01/06/23 1524          Range of Motion Comprehensive    General Range of Motion bilateral lower extremity ROM WFL  -DT     Row Name 01/06/23 1524          Strength Comprehensive (MMT)    General Manual Muscle Testing (MMT) Assessment no strength deficits identified  -DT     Comment, General Manual Muscle Testing (MMT) Assessment BUE=5/5  -DT     Row Name 01/06/23 1524          Balance    Balance Assessment sitting dynamic balance  -DT     Dynamic Sitting Balance standby assist  -DT     Static Standing Balance contact guard  -DT     Dynamic Standing Balance contact guard  -DT           User Key  (r) = Recorded By, (t) = Taken By, (c) = Cosigned By    Initials Name Provider Type    DT More Moreno, OT Occupational Therapist               Goals/Plan     Row Name 01/06/23 1543          Dressing Goal 1 (OT)    Activity/Device (Dressing Goal 1, OT) dressing skills, all  -DT     [place]/Cues Needed (Dressing Goal 1, OT) modified independence  -DT     Time Frame (Dressing Goal 1, OT) 2 weeks  -DT     Row Name 01/06/23 1543          Toileting Goal 1 (OT)    Activity/Device (Toileting Goal 1, OT) toileting skills, all  -DT     [place] Level/Cues Needed (Toileting Goal 1, OT) supervision required  -DT     Time Frame (Toileting Goal 1, OT) 2 weeks  -DT     Row Name 01/06/23  "1543          Grooming Goal 1 (OT)    Activity/Device (Grooming Goal 1, OT) grooming skills, all  -DT     [place] (Grooming Goal 1, OT) set-up required  at sink with good tolerance  -DT     Time Frame (Grooming Goal 1, OT) 2 weeks  -DT     Row Name 01/06/23 1543          Therapy Assessment/Plan (OT)    Planned Therapy Interventions (OT) activity tolerance training;BADL retraining;IADL retraining;occupation/activity based interventions;patient/caregiver education/training;cognitive/visual perception retraining  -DT           User Key  (r) = Recorded By, (t) = Taken By, (c) = Cosigned By    Initials Name Provider Type    DT More Moreno, OT Occupational Therapist               Clinical Impression     Row Name 01/06/23 1525          Pain Scale: FACES Pre/Post-Treatment    Pain: FACES Scale, Pretreatment 2-->hurts little bit  Pt describes abdomen pain as more pressure & discomfort.  -DT     Posttreatment Pain Rating 2-->hurts little bit  -DT     Pain Location - abdomen  -DT     Pre/Posttreatment Pain Comment Pt had watery stool during OT eval which relieved some of her abdomen \"pressure\" & \"discomfort\".  -DT     Row Name 01/06/23 1525          Plan of Care Review    Plan of Care Reviewed With patient  -DT     Progress no change  -DT     Outcome Evaluation Pt is an 82 y.o. female admitted with abd. pain & nausea that has been present for a couple months. EKG showed afib with rate of 153. Dx with a-fib with rapid ventricular response, nausea, abdominal pain. Pt reportedly not taking her medication regularly at home. She reports she lives alone in a 1 story home with her dog & no family support. She has 1 step to enter home. She states she was ind. with BADLs/IADLs & drove infrequently & was having increasing difficulty getting out to get groceries.  Upon eval in the afternoon, pt A&O X4. She was able to follow 1-2 step directives, but required v.c. for safety awareness with ADL transfers due to " impulsivity. She requires CGA for LB ADLs & toileting & SBA for UB ADLs, grooming at B/S. Pt is functioning below her reported baseline; therefore, OT will follow for tx.  Current discharge recommendation is for pt to go to SNF for rehab. Pt does express concern for who will care for her dog if she goes to rehab.  -DT     Row Name 01/06/23 1525          Therapy Assessment/Plan (OT)    Rehab Potential (OT) good, to achieve stated therapy goals  -DT     Criteria for Skilled Therapeutic Interventions Met (OT) yes;meets criteria;skilled treatment is necessary  -DT     Therapy Frequency (OT) 3 times/wk  -DT     Predicted Duration of Therapy Intervention (OT) until d/c  -DT     Row Name 01/06/23 1525          Therapy Plan Review/Discharge Plan (OT)    Anticipated Discharge Disposition (OT) skilled nursing facility  -DT     Row Name 01/06/23 1525          Vital Signs    O2 Delivery Pre Treatment supplemental O2  4 lpm  -DT     O2 Delivery Post Treatment supplemental O2  4 lpm  -DT     Row Name 01/06/23 1525          Positioning and Restraints    Pre-Treatment Position in bed  -DT     Post Treatment Position chair  -DT     In Chair notified nsg;reclined;call light within reach;encouraged to call for assist;with other staff;with nsg;legs elevated  -DT           User Key  (r) = Recorded By, (t) = Taken By, (c) = Cosigned By    Initials Name Provider Type    DT More Moreno, OT Occupational Therapist               Outcome Measures     Row Name 01/06/23 1529          Modified [place] Scale    Pre-Stroke Modified [place] Scale 1 - No significant disability despite symptoms.  Able to carry out all usual duties and activities.  -JV     Modified [place] Scale 4 - Moderately severe disability.  Unable to walk without assistance, and unable to attend to own bodily needs without assistance.  -JV     Row Name 01/06/23 1529          Functional Assessment    Outcome Measure Options Modified [place]  -JV           User Key  (r) =  Recorded By, (t) = Taken By, (c) = Cosigned By    Initials Name Provider Type    Rosio Ribeiro Physical Therapist                Occupational Therapy Education     Title: PT OT SLP Therapies (In Progress)     Topic: Occupational Therapy (In Progress)     Point: ADL training (Done)     Description:   Instruct learner(s) on proper safety adaptation and remediation techniques during self care or transfers.   Instruct in proper use of assistive devices.              Learning Progress Summary           Patient Acceptance, E,TB, VU,NR by DT at 1/6/2023 1545                   Point: Home exercise program (Not Started)     Description:   Instruct learner(s) on appropriate technique for monitoring, assisting and/or progressing therapeutic exercises/activities.              Learner Progress:  Not documented in this visit.          Point: Precautions (Done)     Description:   Instruct learner(s) on prescribed precautions during self-care and functional transfers.              Learning Progress Summary           Patient Acceptance, E,TB, VU,NR by DT at 1/6/2023 1545                   Point: Body mechanics (Not Started)     Description:   Instruct learner(s) on proper positioning and spine alignment during self-care, functional mobility activities and/or exercises.              Learner Progress:  Not documented in this visit.                      User Key     Initials Effective Dates Name Provider Type Discipline    DT 11/03/22 -  More Moreno OT Occupational Therapist OT              OT Recommendation and Plan  Planned Therapy Interventions (OT): activity tolerance training, BADL retraining, IADL retraining, occupation/activity based interventions, patient/caregiver education/training, cognitive/visual perception retraining  Therapy Frequency (OT): 3 times/wk  Plan of Care Review  Plan of Care Reviewed With: patient  Progress: no change  Outcome Evaluation: Pt is an 82 y.o. female admitted with abd. pain &  nausea that has been present for a couple months. EKG showed afib with rate of 153. Dx with a-fib with rapid ventricular response, nausea, abdominal pain. Pt reportedly not taking her medication regularly at home. She reports she lives alone in a 1 story home with her dog & no family support. She has 1 step to enter home. She states she was ind. with BADLs/IADLs & drove infrequently & was having increasing difficulty getting out to get groceries.  Upon eval in the afternoon, pt A&O X4. She was able to follow 1-2 step directives, but required v.c. for safety awareness with ADL transfers due to impulsivity. She requires CGA for LB ADLs & toileting & SBA for UB ADLs, grooming at B/S. Pt is functioning below her reported baseline; therefore, OT will follow for tx.  Current discharge recommendation is for pt to go to SNF for rehab. Pt does express concern for who will care for her dog if she goes to rehab.     Time Calculation:    Time Calculation- OT     Row Name 01/06/23 1546             Time Calculation- OT    OT Start Time 1323  -DT      OT Stop Time 1354  -DT      OT Time Calculation (min) 31 min  -DT      OT Received On 01/06/23  -DT      OT - Next Appointment 01/09/23  -DT      OT Goal Re-Cert Due Date 01/20/23  -DT            User Key  (r) = Recorded By, (t) = Taken By, (c) = Cosigned By    Initials Name Provider Type    More De Jesus OT Occupational Therapist              Therapy Charges for Today     Code Description Service Date Service Provider Modifiers Qty    [id number] HC OT EVAL MOD COMPLEXITY 4 1/6/2023 More Moreno OT GO 1               More Moreno OT  1/6/2023

## 2023-01-06 NOTE — CASE MANAGEMENT/SOCIAL WORK
Continued Stay Note  [facility]     Patient Name: Catalina Moreno  MRN: [id number]  Today's Date: 1/6/2023    Admit Date: 1/4/2023    Plan: Referral to [facility] [place], pending acceptance. No precert required. PASRR per facility.   Discharge Plan     Row Name 01/06/23 1551       Plan    Plan Referral to [facility] [place], pending acceptance. No precert required. PASRR per facility.    Plan Comments Due to reported intermittent confusion from pt nurse, called and spoke with patient son Jordan. He would like referral to [facility] Liaison notified via text, pending acceptance.                    Expected Discharge Date and Time     Expected Discharge Date Expected Discharge Time    Jan 9, 2023             Alicia Foster RN

## 2023-01-06 NOTE — CONSULTS
Referring Provider: Hospitalist  Reason for Consultation: Atrial fibrillation    Patient Care Team:  Rubio Dover MD as PCP - General (Family Medicine)  Flash Gonzalez MD as Consulting Physician (Cardiology)    Chief complaint nausea and abdominal pain    Subjective .     History of present illness:  Catalina Moreno is a 82 y.o. female with history of atrial fibrillation hypertension chronic renal insufficiency and other medical problems presented to the hospital with complaints of nausea and abdominal pain for the last few days.  No complaints of any chest pain.  No shortness of breath palpitation dizziness.  No vomiting diarrhea or fever.  Patient was noted to have atrial fibrillation with rapid response.  She is not taking her medicines including her Eliquis regularly.  She was started on IV Cardizem and her rate was controlled and was switched to oral medicine including digoxin and her heart rates are much better now.  Review of Systems   Constitutional: Negative for fever and malaise/fatigue.   HENT: Negative for ear pain and nosebleeds.    Eyes: Negative for blurred vision and double vision.   Cardiovascular: Negative for chest pain, dyspnea on exertion and palpitations.   Respiratory: Negative for cough and shortness of breath.    Skin: Negative for rash.   Musculoskeletal: Negative for joint pain.   Gastrointestinal: Positive for abdominal pain and nausea. Negative for vomiting.   Neurological: Negative for focal weakness and headaches.   Psychiatric/Behavioral: Negative for depression. The patient is not nervous/anxious.    All other systems reviewed and are negative.      History  Past Medical History:   Diagnosis Date   • Acute kidney injury (HCC) 7/22/2022   • Anxiety    • Arthritis    • Atrial fibrillation (HCC)     paroxysmal   • Broken shoulder     left-- due to fall 11-7-19 was at [facility]   • Buttock pain     rt side   • DDD (degenerative disc disease), lumbar    • Depression    • Edema      9/2020 foot   • GERD (gastroesophageal reflux disease)    • H/O fall    • Hip pain     rt   • Hypertension    • Hypothyroidism    • Insomnia    • Irregular heart beat    • Leg pain     rt   • Low back pain    • Neuropathy    • Pain in both feet    • PONV (postoperative nausea and vomiting)    • Pulmonary hypertension (HCC)    • Radiculopathy    • Restless legs    • Spondylolisthesis    • Urinary incontinence        Past Surgical History:   Procedure Laterality Date   • BACK SURGERY  07/19/2018    PDC &  PSF L3-L5 insitu   • CARDIAC CATHETERIZATION N/A 4/2/2021    Procedure: Cardiac Catheterization/Vascular Study;  Surgeon: Barrett Evans MD;  Location: [facility] INVASIVE LOCATION;  Service: Cardiovascular;  Laterality: N/A;   • CHOLECYSTECTOMY     • COLONOSCOPY     • HYSTERECTOMY     • ROTATOR CUFF REPAIR Left        Family History   Problem Relation Age of Onset   • Cancer Father    • Diabetes Son    • Cancer Paternal Aunt    • Heart disease Paternal Aunt    • Cancer Paternal Uncle        Social History     Tobacco Use   • Smoking status: Never   • Smokeless tobacco: Never   Vaping Use   • Vaping Use: Never used   Substance Use Topics   • Alcohol use: No   • Drug use: No        Medications Prior to Admission   Medication Sig Dispense Refill Last Dose   • apixaban (ELIQUIS) 5 MG tablet tablet Take 1 tablet by mouth Every 12 (Twelve) Hours. Indications: Atrial Fibrillation 60 tablet 2 Past Week   • atorvastatin (LIPITOR) 10 MG tablet Take 1 tablet by mouth Every Night. 90 tablet 0 Past Week   • dilTIAZem CD (CARDIZEM CD) 120 MG 24 hr capsule Take 1 capsule by mouth Daily. 30 capsule 2 1/3/2023 at 1900   • famotidine (PEPCID) 20 MG tablet Take 20 mg by mouth Daily. Indications: Heartburn  0 1/3/2023 at 1900   • gabapentin (NEURONTIN) 600 MG tablet TAKE 1 TABLET BY MOUTH FOUR TIMES DAILY 120 tablet 11 1/3/2023 at 1900   • HYDROcodone-acetaminophen (NORCO) 7.5-325 MG per tablet Take 1 tablet by mouth 4  (Four) Times a Day As Needed for Severe Pain. DNF before 11/2/2022 120 tablet 0 1/4/2023   • levothyroxine (SYNTHROID, LEVOTHROID) 88 MCG tablet Take 88 mcg by mouth Daily. Indications: Underactive Thyroid   1/3/2023 at 1900   • loperamide (IMODIUM A-D) 2 MG tablet Take 2 mg by mouth 4 (Four) Times a Day As Needed for Diarrhea. Indications: Diarrhea   Past Week   • metoprolol succinate XL (TOPROL-XL) 50 MG 24 hr tablet    1/3/2023 at 1900   • sertraline (ZOLOFT) 50 MG tablet Take 50 mg by mouth Every Night. Indications: Major Depressive Disorder   1/3/2023 at 1900   • tiZANidine (ZANAFLEX) 4 MG tablet Take 4 mg by mouth 2 (Two) Times a Day As Needed. Indications: Musculoskeletal Pain   1/3/2023   • vitamin B-12 (VITAMIN B-12) 1000 MCG tablet Take 1 tablet by mouth Daily. 30 tablet 0 Past Week   • digoxin (LANOXIN) 125 MCG tablet Take 1 tablet by mouth Daily. (Patient taking differently: Take 125 mcg by mouth Daily. Has never been called in Dr. Zuniga office notifed x 2 of med not in pts home. 9/21) 90 tablet 0    • lidocaine (LIDODERM) 5 % UNWRAP AND APPLY 2 PATCH(ES) ON THE SKIN DAILY AS DIRECTED BY PROVIDER. REMOVE AND DISCARD PATCH(ES) WITHIN 12 HOURS OR AS DIRECTED BY MD 90 patch 11    • pantoprazole (PROTONIX) 40 MG EC tablet Take 40 mg by mouth Daily With Lunch. Afternoons  Indications: Gastroesophageal Reflux Disease  0 Unknown   • rOPINIRole (REQUIP) 0.25 MG tablet Take 1 tablet by mouth Every Night. Take 1 hour before bedtime 30 tablet 5 Unknown   • sildenafil (REVATIO) 20 MG tablet Indications: Pulmonary Arterial Hypertension   Unknown         Baclofen, Codeine, Ibuprofen, Methocarbamol, Naproxen, Tizanidine hcl, and Tolmetin    Scheduled Meds:apixaban, 5 mg, Oral, Q12H  atorvastatin, 10 mg, Oral, Nightly  bisacodyl, 10 mg, Rectal, Daily  digoxin, 125 mcg, Oral, Daily  famotidine, 20 mg, Oral, Daily  gabapentin, 600 mg, Oral, 4x Daily  levothyroxine, 88 mcg, Oral, Daily  metoclopramide, 5 mg, Oral, TID  "AC  metoprolol succinate XL, 50 mg, Oral, Q24H  pantoprazole, 40 mg, Oral, Daily With Lunch  polyethylene glycol, 17 g, Oral, Daily  rOPINIRole, 0.25 mg, Oral, Nightly  sertraline, 50 mg, Oral, Nightly  sildenafil, 20 mg, Oral, Q8H  sodium chloride, 10 mL, Intravenous, Q12H  sorbitol, 50 mL, Oral, Once  cyanocobalamin, 1,000 mcg, Oral, Daily      Continuous Infusions:   PRN Meds:.•  acetaminophen **OR** acetaminophen **OR** acetaminophen  •  aluminum-magnesium hydroxide-simethicone  •  calcium carbonate  •  Calcium Gluconate-NaCl **AND** calcium gluconate **AND** Calcium, Ionized  •  HYDROcodone-acetaminophen  •  magnesium sulfate **OR** magnesium sulfate in D5W 1g/100mL (PREMIX)  •  melatonin  •  ondansetron **OR** ondansetron  •  potassium chloride  •  potassium chloride  •  senna-docusate sodium  •  sodium chloride  •  sodium chloride  •  tiZANidine    Objective     VITAL SIGNS  Vitals:    01/06/23 0158 01/06/23 0504 01/06/23 0720 01/06/23 1008   BP: 104/56 126/67 109/53 (!) 90/38   BP Location: Left arm Left arm Right arm Left arm   Patient Position: Lying Lying  Lying   Pulse: 80 112 90 67   Resp: 10 12  9   Temp: 97.4 °F (36.3 °C) 98.2 °F (36.8 °C)  98.1 °F (36.7 °C)   TempSrc: Oral Oral  Oral   SpO2: 94% 95%  99%   Weight: 76.8 kg (169 lb 5 oz) 77.1 kg (170 lb)     Height:           Flowsheet Rows    Flowsheet Row First Filed Value   Admission Height 160 cm (63\") Documented at 01/04/2023 1754   Admission Weight 75.8 kg (167 lb) Documented at 01/04/2023 1754           TELEMETRY: Atrial fibrillation with controlled ventricular response    Physical Exam:  Constitutional:       Appearance: Well-developed.   Eyes:      General: No scleral icterus.     Conjunctiva/sclera: Conjunctivae normal.      Pupils: Pupils are equal, round, and reactive to light.   HENT:      Head: Normocephalic and atraumatic.   Neck:      Vascular: No carotid bruit or JVD.   Pulmonary:      Effort: Pulmonary effort is normal.      Breath " sounds: Normal breath sounds. No wheezing. No rales.   Cardiovascular:      Normal rate. Irregularly irregular rhythm.   Pulses:     Intact distal pulses.   Abdominal:      General: Bowel sounds are normal.      Palpations: Abdomen is soft.   Musculoskeletal: Normal range of motion.      Cervical back: Normal range of motion and neck supple. Skin:     General: Skin is warm and dry.      Findings: No rash.   Neurological:      Mental Status: Alert.      Comments: No focal deficits          Results Review:   I reviewed the patient's new clinical results.  Lab Results (last 24 hours)     Procedure Component Value Units Date/Time    Comprehensive Metabolic Panel [[id number]]  (Abnormal) Collected: 01/06/23 0340    Specimen: Blood Updated: 01/06/23 0501     Glucose 93 mg/dL      BUN 20 mg/dL      Creatinine 1.01 mg/dL      Sodium 141 mmol/L      Potassium 4.6 mmol/L      Chloride 108 mmol/L      CO2 23.0 mmol/L      Calcium 8.4 mg/dL      Total Protein 6.2 g/dL      Albumin 3.7 g/dL      ALT (SGPT) 15 U/L      AST (SGOT) 25 U/L      Alkaline Phosphatase 76 U/L      Total Bilirubin 1.1 mg/dL      Globulin 2.5 gm/dL      A/G Ratio 1.5 g/dL      BUN/Creatinine Ratio 19.8     Anion Gap 10.0 mmol/L      eGFR 56.0 mL/min/1.73      Comment: National Kidney Foundation and American Society of Nephrology (ASN) Task Force recommended calculation based on the Chronic Kidney Disease Epidemiology Collaboration (CKD-EPI) equation refit without adjustment for race.       Narrative:      GFR Normal >60  Chronic Kidney Disease <60  Kidney Failure <15    The GFR formula is only valid for adults with stable renal function between ages 18 and 70.    Phosphorus [[id number]]  (Normal) Collected: 01/06/23 0340    Specimen: Blood Updated: 01/06/23 0501     Phosphorus 3.2 mg/dL     Magnesium [[id number]]  (Normal) Collected: 01/06/23 0340    Specimen: Blood Updated: 01/06/23 0501     Magnesium 2.1 mg/dL     CBC & Differential [[id number]]  (Abnormal)  Collected: 01/06/23 0340    Specimen: Blood Updated: 01/06/23 0430    Narrative:      The following orders were created for panel order CBC & Differential.  Procedure                               Abnormality         Status                     ---------                               -----------         ------                     CBC Auto Differential[[id number]]        Abnormal            Final result                 Please view results for these tests on the individual orders.    CBC Auto Differential [[id number]]  (Abnormal) Collected: 01/06/23 0340    Specimen: Blood Updated: 01/06/23 0430     WBC 5.10 10*3/mm3      RBC 3.66 10*6/mm3      Hemoglobin 9.8 g/dL      Hematocrit 31.0 %      MCV 84.7 fL      MCH 26.7 pg      MCHC 31.5 g/dL      RDW 18.7 %      RDW-SD 59.5 fl      MPV 9.2 fL      Platelets 163 10*3/mm3      Neutrophil % 41.9 %      Lymphocyte % 42.4 %      Monocyte % 7.8 %      Eosinophil % 5.9 %      Basophil % 2.0 %      Neutrophils, Absolute 2.10 10*3/mm3      Lymphocytes, Absolute 2.20 10*3/mm3      Monocytes, Absolute 0.40 10*3/mm3      Eosinophils, Absolute 0.30 10*3/mm3      Basophils, Absolute 0.10 10*3/mm3      nRBC 0.1 /100 WBC           Imaging Results (Last 24 Hours)     ** No results found for the last 24 hours. **          EKG      I personally viewed and interpreted the patient's EKG/Telemetry data:    ECHOCARDIOGRAM:      STRESS MYOVIEW:    CARDIAC CATHETERIZATION:    OTHER:         Assessment & Plan     Principal Problem:    Atrial fibrillation with rapid ventricular response (HCC)  Active Problems:    Depressive disorder    Primary fibromyalgia syndrome    Gastroesophageal reflux disease    Essential hypertension    Hypothyroidism    Pulmonary hypertension (HCC)    Stage 2 chronic kidney disease    Chronic low back pain    Generalized anxiety disorder    Restless legs syndrome    Chronic diastolic CHF (congestive heart failure) ([facility])  Abdominal pain with nausea    Patient presented with  abdominal pain and nausea and is having work-up done  Patient is not taking her medicines regularly and heart rate was high  Patient had atrial fibrillation in the past and was on metoprolol and Cardizem digoxin and Eliquis  Will restart her home medications and watch her heart rhythm  Patient is ruled out for MI by EKG enzymes.  Patient is also having an echocardiogram performed.  No further cardiac work-up.    I discussed the patients findings and my recommendations with patient and nurse    Flash Gonzalez MD  01/06/23  12:52 EST

## 2023-01-06 NOTE — PLAN OF CARE
Pt presented with no new acute issues this shift, pt less confused and more compliant with keeping oxygen in place, O2 sats 92 percent or better on 3 liters nasal cannula, no reports or complaints of Chest Pain, difficulty breathing or shortness of breath.  Heart rate elevated 90's to 140's at end of shift, pt's AM dose of Metoprolol given early with success.  Pt reporting leg spasms and leg pain at end of shift, PRN Tizanidine and Norco administered, monitoring for effectiveness.  Pt resting well in bed, sitter dc'd midway through shift, will continue to monitor and observe.     Goal Outcome Evaluation:  Plan of Care Reviewed With: patient        Progress: no change      Problem: Adult Inpatient Plan of Care  Goal: Plan of Care Review  Outcome: Ongoing, Progressing  Flowsheets (Taken 1/6/2023 0656)  Progress: no change  Plan of Care Reviewed With: patient     Problem: Fall Injury Risk  Goal: Absence of Fall and Fall-Related Injury  Outcome: Ongoing, Progressing     Problem: Pain Acute  Goal: Acceptable Pain Control and Functional Ability  Outcome: Ongoing, Progressing     Problem: Dysrhythmia  Goal: Normalized Cardiac Rhythm  Outcome: Ongoing, Progressing     Problem: Skin Injury Risk Increased  Goal: Skin Health and Integrity  Outcome: Ongoing, Progressing

## 2023-01-06 NOTE — THERAPY EVALUATION
Patient Name: Catalina Moreno  : 1940    MRN: [id number]                              Today's Date: 2023       Admit Date: 2023    Visit Dx:     ICD-10-CM ICD-9-CM   1. Atrial fibrillation with rapid ventricular response ([facility])  I48.91 427.31   2. Generalized abdominal pain  R10.84 789.07     Patient Active Problem List   Diagnosis   • Arthritis   • Neuropathic pain   • Other long term (current) drug therapy   • Polyarthralgia   • Sacroiliitis ([facility])   • Depressive disorder   • Primary fibromyalgia syndrome   • Gastroesophageal reflux disease   • Essential hypertension   • Lightheadedness   • Hypothyroidism   • Lumbar radiculopathy   • Altered mental status   • Acute on chronic diastolic congestive heart failure ([facility])   • Nonrheumatic tricuspid valve regurgitation   • Atrial fibrillation with RVR ([facility])   • Pulmonary hypertension ([facility])   • Acquired spondylolisthesis of lumbosacral region   • Spondylolisthesis, lumbar region   • Vitamin D deficiency   • Stage 2 chronic kidney disease   • DDD (degenerative disc disease), cervical   • Chronic pain   • Chronic low back pain   • Cervical stenosis of spinal canal   • Anemia   • Polypharmacy   • Age-related cognitive decline   • Edema, unspecified   • Generalized anxiety disorder   • History of falling   • Insomnia, unspecified   • Major depressive disorder, single episode, unspecified   • Need for assistance with personal care   • Other dysphagia   • Polyneuropathy, unspecified   • Unsteadiness on feet   • Restless legs syndrome   • Vomiting, unspecified   • Weakness   • Acute on chronic diastolic (congestive) heart failure ([facility])   • Unspecified abdominal pain   • Paroxysmal atrial fibrillation ([facility])   • Chronic pain disorder   • Other intervertebral disc degeneration, lumbar region   • Essential (primary) hypertension   • Spondylolisthesis, lumbar region   • Hypothyroidism, unspecified   • Gastro-esophageal reflux disease without esophagitis   • Pulmonary  hypertension, unspecified (HCC)   • Chronic diastolic CHF (congestive heart failure) (HCC)   • Congestive heart failure (HCC)   • Acute kidney injury (HCC)   • Atrial fibrillation with rapid ventricular response (HCC)     Past Medical History:   Diagnosis Date   • Acute kidney injury (HCC) 7/22/2022   • Anxiety    • Arthritis    • Atrial fibrillation (HCC)     paroxysmal   • Broken shoulder     left-- due to fall 11-7-19 was at [facility]   • Buttock pain     rt side   • DDD (degenerative disc disease), lumbar    • Depression    • Edema     9/2020 foot   • GERD (gastroesophageal reflux disease)    • H/O fall    • Hip pain     rt   • Hypertension    • Hypothyroidism    • Insomnia    • Irregular heart beat    • Leg pain     rt   • Low back pain    • Neuropathy    • Pain in both feet    • PONV (postoperative nausea and vomiting)    • Pulmonary hypertension (HCC)    • Radiculopathy    • Restless legs    • Spondylolisthesis    • Urinary incontinence      Past Surgical History:   Procedure Laterality Date   • BACK SURGERY  07/19/2018    PDC &  PSF L3-L5 insitu   • CARDIAC CATHETERIZATION N/A 4/2/2021    Procedure: Cardiac Catheterization/Vascular Study;  Surgeon: Barrett Evans MD;  Location: [facility] INVASIVE LOCATION;  Service: Cardiovascular;  Laterality: N/A;   • CHOLECYSTECTOMY     • COLONOSCOPY     • HYSTERECTOMY     • ROTATOR CUFF REPAIR Left       General Information     Row Name 01/06/23 1324          Physical Therapy Time and Intention    Document Type evaluation  -JV     Mode of Treatment physical therapy  -JV     Row Name 01/06/23 1324          General Information    Patient Profile Reviewed yes  -JV     Prior Level of Function independent:;all household mobility;community mobility;driving;shopping;bed mobility  -JV     Existing Precautions/Restrictions pacemaker;cardiac;fall;oxygen therapy device and L/min  -JV     Barriers to Rehab medically complex;previous functional deficit;cognitive status   -JV     Row Name 01/06/23 1324          Living Environment    People in Home alone  -JV     Row Name 01/06/23 1324          Home Main Entrance    Number of Stairs, Main Entrance one  -JV     Row Name 01/06/23 1324          Stairs Within Home, Primary    Number of Stairs, Within Home, Primary none  -JV     Row Name 01/06/23 1324          Cognition    Orientation Status (Cognition) oriented x 4  -JV     Row Name 01/06/23 1324          Safety Issues, Functional Mobility    Safety Issues Affecting Function (Mobility) insight into deficits/self-awareness;judgment  -JV     Impairments Affecting Function (Mobility) balance;cognition  -JV     Cognitive Impairments, Mobility Safety/Performance awareness, need for assistance;attention;judgment  -JV           User Key  (r) = Recorded By, (t) = Taken By, (c) = Cosigned By    Initials Name Provider Type    Rosio Ribeiro Physical Therapist               Mobility     Row Name 01/06/23 1518          Bed Mobility    Bed Mobility supine-sit  -JV     Supine-Sit [place] (Bed Mobility) supervision  -JV     Assistive Device (Bed Mobility) bed rails;head of bed elevated  -JV     Row Name 01/06/23 1518          Bed-Chair Transfer    Bed-Chair [place] (Transfers) contact guard;verbal cues;nonverbal cues (demo/gesture)  -JV     Assistive Device (Bed-Chair Transfers) walker, front-wheeled  -JV     Row Name 01/06/23 1518          Sit-Stand Transfer    Sit-Stand [place] (Transfers) verbal cues;nonverbal cues (demo/gesture);contact guard  -JV     Assistive Device (Sit-Stand Transfers) walker, front-wheeled  -JV     Row Name 01/06/23 1518          Gait/Stairs (Locomotion)    [place] Level (Gait) verbal cues;nonverbal cues (demo/gesture);contact guard  -JV     Assistive Device (Gait) walker, front-wheeled  -JV     Distance in Feet (Gait) 80 ft - SaO2 post 97% with 4L NC; pt with poor attention to obstacles/environmental awareness  -JV     Deviations/Abnormal Patterns  (Gait) base of support, narrow;gait speed decreased;chad decreased  -JV     Bilateral Gait Deviations forward flexed posture  -JV           User Key  (r) = Recorded By, (t) = Taken By, (c) = Cosigned By    Initials Name Provider Type    Rosio Ribeiro Physical Therapist               Obj/Interventions     Row Name 01/06/23 1325          Range of Motion Comprehensive    General Range of Motion bilateral lower extremity ROM WFL  -JV     Row Name 01/06/23 1325          Strength Comprehensive (MMT)    Comment, General Manual Muscle Testing (MMT) Assessment BLE grossly 4/5  -JV     Row Name 01/06/23 1522          Balance    Balance Assessment standing static balance;standing dynamic balance  -JV     Static Standing Balance contact guard  -JV     Dynamic Standing Balance contact guard  -JV     Position/Device Used, Standing Balance supported  -JV     Row Name 01/06/23 1325          Sensory Assessment (Somatosensory)    Sensory Assessment (Somatosensory) LE sensation intact  -JV           User Key  (r) = Recorded By, (t) = Taken By, (c) = Cosigned By    Initials Name Provider Type    Rosio Ribeiro Physical Therapist               Goals/Plan     Row Name 01/06/23 1528          Bed Mobility Goal 1 (PT)    Activity/Assistive Device (Bed Mobility Goal 1, PT) bed mobility activities, all  -JV     [place] Level/Cues Needed (Bed Mobility Goal 1, PT) modified independence  -JV     Time Frame (Bed Mobility Goal 1, PT) long term goal (LTG);2 weeks  -JV     Row Name 01/06/23 1528          Transfer Goal 1 (PT)    Activity/Assistive Device (Transfer Goal 1, PT) transfers, all;walker, rolling  -JV     [place] Level/Cues Needed (Transfer Goal 1, PT) modified independence  -JV     Time Frame (Transfer Goal 1, PT) long term goal (LTG);2 weeks  -JV     Row Name 01/06/23 1528          Gait Training Goal 1 (PT)    Activity/Assistive Device (Gait Training Goal 1, PT) gait (walking locomotion);walker, rolling  -JV      [place] Level (Gait Training Goal 1, PT) modified independence  -JV     Distance (Gait Training Goal 1, PT) 80 ft  -JV     Time Frame (Gait Training Goal 1, PT) long term goal (LTG);2 weeks  -JV     Row Name 01/06/23 1528          Therapy Assessment/Plan (PT)    Planned Therapy Interventions (PT) balance training;bed mobility training;gait training;home exercise program;patient/family education;strengthening;stair training;transfer training  -JV           User Key  (r) = Recorded By, (t) = Taken By, (c) = Cosigned By    Initials Name Provider Type    Rosio Ribeiro Physical Therapist               Clinical Impression     Row Name 01/06/23 1523          Pain    Pretreatment Pain Rating 2/10  -JV     Posttreatment Pain Rating 2/10  -JV     Pain Location - abdomen  -JV     Row Name 01/06/23 1523          Plan of Care Review    Outcome Evaluation Pt is an 81 y/o female presenting to [facility] on 1/4/23 with c/o nausea starting a few months ago. Chest XR 1/4/23: Stable cardiomegaly; Elevated right hemidiaphragm; No acute process in the chest. Pt lives alone in [facility] with one step to enter; reports PLOF of Mod(I) with RWx for household/community mobility/ambulation. New reliance on supplemental O2 (4L NC when with PT entered room) and limited social support. Pt with functional decline requiring CGA with transfers with RWx and with gait x80 ft with RWx. Pt with poor safety awareness and obstacle negotiation. Due to functional decline, impaired cognition, and limited social support, follow-up SNF recommended at time of d/c from [facility].  -JV     Row Name 01/06/23 1523          Therapy Assessment/Plan (PT)    Criteria for Skilled Interventions Met (PT) yes;meets criteria;skilled treatment is necessary  -JV     Therapy Frequency (PT) 5 times/wk  -JV     Predicted Duration of Therapy Intervention (PT) until d/c  -JV     Row Name 01/06/23 1523          Vital Signs    Post SpO2 (%) 97  -JV     O2 Delivery Post Treatment  supplemental O2  4L NC - nurse notified - pt did not use supplemental O2 in PLOF.  -     Post Patient Position Sitting  -     Row Name 01/06/23 1523          Positioning and Restraints    Pre-Treatment Position in bed  -     Post Treatment Position chair  -     In Chair notified nsg;reclined;call light within reach;encouraged to call for assist;exit alarm on;with other staff  -           User Key  (r) = Recorded By, (t) = Taken By, (c) = Cosigned By    Initials Name Provider Type    Rosio Ribeiro Physical Therapist               Outcome Measures     Row Name 01/06/23 1529          Modified Murray Scale    Pre-Stroke Modified Murray Scale 1 - No significant disability despite symptoms.  Able to carry out all usual duties and activities.  -     Modified [place] Scale 4 - Moderately severe disability.  Unable to walk without assistance, and unable to attend to own bodily needs without assistance.  -     Row Name 01/06/23 1529          Functional Assessment    Outcome Measure Options Modified Derek  -           User Key  (r) = Recorded By, (t) = Taken By, (c) = Cosigned By    Initials Name Provider Type    Rosio Ribeiro Physical Therapist                             Physical Therapy Education     Title: PT OT SLP Therapies (Done)     Topic: Physical Therapy (Done)     Point: Mobility training (Done)     Learning Progress Summary           Patient Acceptance, E,TB, VU by  at 1/6/2023 1530                               User Key     Initials Effective Dates Name Provider Type Discipline     03/30/21 -  Rosio Fam Physical Therapist PT              PT Recommendation and Plan  Planned Therapy Interventions (PT): balance training, bed mobility training, gait training, home exercise program, patient/family education, strengthening, stair training, transfer training  Outcome Evaluation: Pt is an 81 y/o female presenting to [facility] on 1/4/23 with c/o nausea starting a few months ago. Chest  XR 1/4/23: Stable cardiomegaly; Elevated right hemidiaphragm; No acute process in the chest. Pt lives alone in [facility] with one step to enter; reports PLOF of Mod(I) with RWx for household/community mobility/ambulation. New reliance on supplemental O2 (4L NC when with PT entered room) and limited social support. Pt with functional decline requiring CGA with transfers with RWx and with gait x80 ft with RWx. Pt with poor safety awareness and obstacle negotiation. Due to functional decline, impaired cognition, and limited social support, follow-up SNF recommended at time of d/c from [facility].     Time Calculation:    PT Charges     Row Name 01/06/23 1531             Time Calculation    Start Time 1312  -JV      Stop Time 1350  -JV      Time Calculation (min) 38 min  -JV      PT Received On 01/06/23  -JV      PT - Next Appointment 01/08/23  -JDION      PT Goal Re-Cert Due Date 01/20/23  -JV         Time Calculation- PT    Total Timed Code Minutes- PT 0 minute(s)  -TANIAV            User Key  (r) = Recorded By, (t) = Taken By, (c) = Cosigned By    Initials Name Provider Type    Rosio Ribeiro Physical Therapist              Therapy Charges for Today     Code Description Service Date Service Provider Modifiers Qty    87044862815 HC PT EVAL MOD COMPLEXITY 4 1/6/2023 Rosio Fam GP 1          PT G-Codes  Outcome Measure Options: Modified [place]  Modified [place] Scale: 4 - Moderately severe disability.  Unable to walk without assistance, and unable to attend to own bodily needs without assistance.  PT Discharge Summary  Anticipated Discharge Disposition (PT): skilled nursing facility    Rosio Fam  1/6/2023

## 2023-01-06 NOTE — PLAN OF CARE
Goal Outcome Evaluation:              Outcome Evaluation: Pt is an 83 y/o female presenting to [facility] on 1/4/23 with c/o nausea starting a few months ago. Chest XR 1/4/23: Stable cardiomegaly; Elevated right hemidiaphragm; No acute process in the chest. Pt lives alone in [facility] with one step to enter; reports PLOF of Mod(I) with RWx for household/community mobility/ambulation. New reliance on supplemental O2 (4L NC when with PT entered room) and limited social support. Pt with functional decline requiring CGA with transfers with RWx and with gait x80 ft with RWx. Pt with poor safety awareness and obstacle negotiation. Due to functional decline, impaired cognition, and limited social support, follow-up SNF recommended at time of d/c from [facility].

## 2023-01-06 NOTE — PLAN OF CARE
Goal Outcome Evaluation:  Plan of Care Reviewed With: patient        Progress: no change  Outcome Evaluation: Pt is an 82 y.o. female admitted with abd. pain & nausea that has been present for a couple months. EKG showed afib with rate of 153. Dx with a-fib with rapid ventricular response, nausea, abdominal pain. Pt reportedly not taking her medication regularly at home. She reports she lives alone in a 1 story home with her dog & no family support. She has 1 step to enter home. She states she was ind. with BADLs/IADLs & drove infrequently & was having increasing difficulty getting out to get groceries.  Upon eval in the afternoon, pt A&O X4. She was able to follow 1-2 step directives, but required v.c. for safety awareness with ADL transfers due to impulsivity. She requires CGA for LB ADLs & toileting & SBA for UB ADLs, grooming at B/S. Pt is functioning below her reported baseline; therefore, OT will follow for tx.  Current discharge recommendation is for pt to go to SNF for rehab. Pt does express concern for who will care for her dog if she goes to rehab.

## 2023-01-06 NOTE — PROGRESS NOTES
[facility] Daily Progress Note    Patient Name: Catalina Moreno  : 1940  MRN: [id number]  Primary Care Physician:  Rubio Dover MD  Date of admission: 2023      Subjective      Chief Complaint: Tachyarrhythmia nausea        Patient Reports she is feeling nauseous.    Still no bowel movement as of this morning.  Had 1 L out.  Abdominal x-ray ordered.  A. fib improved     ROS negative except as above.    Objective      Vitals:   Temp:  [97.4 °F (36.3 °C)-98.2 °F (36.8 °C)] 97.5 °F (36.4 °C)  Heart Rate:  [] 91  Resp:  [9-12] 10  BP: ()/(38-74) 124/69  Flow (L/min):  [2-4] 4    Physical Exam  Vitals reviewed.   Constitutional:       Comments: Nurse at bedside  Patient wearing nasal cannula   HENT:      Head: Normocephalic.      Nose: Nose normal.      Mouth/Throat:      Mouth: Mucous membranes are moist.   Cardiovascular:      Rate and Rhythm: Tachycardia present. Rhythm irregular.      Pulses: Normal pulses.      Heart sounds: Normal heart sounds.   Pulmonary:      Effort: Pulmonary effort is normal.      Breath sounds: Normal breath sounds.   Abdominal:      General: Abdomen is flat.      Palpations: Abdomen is soft.      Comments: Fullness in abdomen but not tender   Musculoskeletal:         General: Normal range of motion.      Cervical back: Normal range of motion.   Skin:     General: Skin is warm.   Neurological:      General: No focal deficit present.      Mental Status: She is alert and oriented to person, place, and time.   Psychiatric:         Mood and Affect: Mood normal.         Behavior: Behavior normal.        Result Review    Result Review:  I have personally reviewed the results from the time of this admission to 2023 15:25 EST and agree with these findings:  [x]  Laboratory  []  Microbiology  []  Radiology  []  EKG/Telemetry   []  Cardiology/Vascular   []  Pathology  []  Old records  []  Other:  Most notable findings  include: Hemoglobin 9.8         Assessment & Plan       Brief Patient Summary:  Catalina Moreno is a 82 y.o. female who presents with A. fib with RVR        apixaban, 5 mg, Oral, Q12H  atorvastatin, 10 mg, Oral, Nightly  digoxin, 125 mcg, Oral, Daily  famotidine, 20 mg, Oral, Daily  gabapentin, 600 mg, Oral, 4x Daily  levothyroxine, 88 mcg, Oral, Daily  metoclopramide, 5 mg, Oral, TID AC  metoprolol succinate XL, 50 mg, Oral, Q24H  pantoprazole, 40 mg, Oral, Daily With Lunch  polyethylene glycol, 17 g, Oral, Daily  rOPINIRole, 0.25 mg, Oral, Nightly  sertraline, 50 mg, Oral, Nightly  sildenafil, 20 mg, Oral, Q8H  sodium chloride, 10 mL, Intravenous, Q12H  cyanocobalamin, 1,000 mcg, Oral, Daily               Active Hospital Problems:       Active Hospital Problems     Diagnosis     • **Atrial fibrillation with rapid ventricular response (HCC)     • Chronic diastolic CHF (congestive heart failure) (HCC)     • Generalized anxiety disorder     • Restless legs syndrome     • Stage 2 chronic kidney disease     • Pulmonary hypertension (HCC)     • Chronic low back pain     • Depressive disorder     • Primary fibromyalgia syndrome     • Gastroesophageal reflux disease     • Essential hypertension     • Hypothyroidism        Plan:      Atrial fibrillation with rapid ventricular response  -chest x-ray showed stable cardiomegaly; elevated right hemidiaphragm; no acute process in the chest  -EKG showed A. fib with rate of 153  -Digoxin level 0.3  -Cardizem drip  -Echo ordered  -Cardiology consulted  -Cardizem drip weaned off     Chronic diastolic CHF (congestive heart failure)   -proBNP 9 9074, baseline 2469  -EF 45% on 3/5/2022  -Daily weights  -2000 mL p.o. fluid restriction  -Strict intake and output     Chronic low back pain  Primary fibromyalgia syndrome     Essential hypertension  -BP controlled     Pulmonary hypertension     Stage 2 chronic kidney disease  -Creatinine 1.19, baseline 0.91  -GFR 46, baseline  66.1     Restless legs syndrome     Depressive disorder  Generalized anxiety disorder  -Stable     Gastroesophageal reflux disease     Constipation  MiraLAX ordered  Had a bowel movement January 60  Abdominal x-ray pending     Hypothyroidism     DVT prophylaxis:  Medical DVT prophylaxis orders are present.     CODE STATUS:    Code Status (Patient has no pulse and is not breathing): CPR (Attempt to Resuscitate)  Medical Interventions (Patient has pulse or is breathing): Full Support     Admission Status:  I believe this patient meets inpatient status.     I discussed the patient's findings and my recommendations with patient.     This patient has been examined wearing appropriate Personal Protective Equipment    01/06/23      Electronically signed by Dom Murray MD, 01/06/23, 15:25 EST.  [facility] Hospitalist Team

## 2023-01-07 LAB
ALBUMIN SERPL-MCNC: 3.8 G/DL (ref 3.5–5.2)
ALBUMIN/GLOB SERPL: 1.4 G/DL
ALP SERPL-CCNC: 82 U/L (ref 39–117)
ALT SERPL W P-5'-P-CCNC: 17 U/L (ref 1–33)
ANION GAP SERPL CALCULATED.3IONS-SCNC: 9 MMOL/L (ref 5–15)
AST SERPL-CCNC: 28 U/L (ref 1–32)
BASOPHILS # BLD AUTO: 0.1 10*3/MM3 (ref 0–0.2)
BASOPHILS NFR BLD AUTO: 0.8 % (ref 0–1.5)
BILIRUB SERPL-MCNC: 0.8 MG/DL (ref 0–1.2)
BUN SERPL-MCNC: 22 MG/DL (ref 8–23)
BUN/CREAT SERPL: 24.2 (ref 7–25)
CALCIUM SPEC-SCNC: 8.7 MG/DL (ref 8.6–10.5)
CHLORIDE SERPL-SCNC: 100 MMOL/L (ref 98–107)
CO2 SERPL-SCNC: 26 MMOL/L (ref 22–29)
CREAT SERPL-MCNC: 0.91 MG/DL (ref 0.57–1)
DEPRECATED RDW RBC AUTO: 55.6 FL (ref 37–54)
EGFRCR SERPLBLD CKD-EPI 2021: 63.5 ML/MIN/1.73
EOSINOPHIL # BLD AUTO: 0.1 10*3/MM3 (ref 0–0.4)
EOSINOPHIL NFR BLD AUTO: 1.5 % (ref 0.3–6.2)
ERYTHROCYTE [DISTWIDTH] IN BLOOD BY AUTOMATED COUNT: 17.7 % (ref 12.3–15.4)
GLOBULIN UR ELPH-MCNC: 2.7 GM/DL
GLUCOSE SERPL-MCNC: 117 MG/DL (ref 65–99)
HCT VFR BLD AUTO: 29.1 % (ref 34–46.6)
HGB BLD-MCNC: 9.4 G/DL (ref 12–15.9)
LYMPHOCYTES # BLD AUTO: 0.9 10*3/MM3 (ref 0.7–3.1)
LYMPHOCYTES NFR BLD AUTO: 14.7 % (ref 19.6–45.3)
MAGNESIUM SERPL-MCNC: 2.1 MG/DL (ref 1.6–2.4)
MCH RBC QN AUTO: 27.2 PG (ref 26.6–33)
MCHC RBC AUTO-ENTMCNC: 32.2 G/DL (ref 31.5–35.7)
MCV RBC AUTO: 84.4 FL (ref 79–97)
MONOCYTES # BLD AUTO: 0.3 10*3/MM3 (ref 0.1–0.9)
MONOCYTES NFR BLD AUTO: 5 % (ref 5–12)
NEUTROPHILS NFR BLD AUTO: 5 10*3/MM3 (ref 1.7–7)
NEUTROPHILS NFR BLD AUTO: 78 % (ref 42.7–76)
NRBC BLD AUTO-RTO: 0.1 /100 WBC (ref 0–0.2)
PHOSPHATE SERPL-MCNC: 2.7 MG/DL (ref 2.5–4.5)
PLATELET # BLD AUTO: 165 10*3/MM3 (ref 140–450)
PMV BLD AUTO: 9.3 FL (ref 6–12)
POTASSIUM SERPL-SCNC: 4.7 MMOL/L (ref 3.5–5.2)
PROT SERPL-MCNC: 6.5 G/DL (ref 6–8.5)
RBC # BLD AUTO: 3.45 10*6/MM3 (ref 3.77–5.28)
SODIUM SERPL-SCNC: 135 MMOL/L (ref 136–145)
WBC NRBC COR # BLD: 6.4 10*3/MM3 (ref 3.4–10.8)

## 2023-01-07 PROCEDURE — 85025 COMPLETE CBC W/AUTO DIFF WBC: CPT | Performed by: NURSE PRACTITIONER

## 2023-01-07 PROCEDURE — 80053 COMPREHEN METABOLIC PANEL: CPT | Performed by: INTERNAL MEDICINE

## 2023-01-07 PROCEDURE — 36415 COLL VENOUS BLD VENIPUNCTURE: CPT | Performed by: INTERNAL MEDICINE

## 2023-01-07 PROCEDURE — 25010000002 ONDANSETRON PER 1 MG: Performed by: NURSE PRACTITIONER

## 2023-01-07 PROCEDURE — 84100 ASSAY OF PHOSPHORUS: CPT | Performed by: INTERNAL MEDICINE

## 2023-01-07 PROCEDURE — 83735 ASSAY OF MAGNESIUM: CPT | Performed by: NURSE PRACTITIONER

## 2023-01-07 RX ORDER — CYCLOBENZAPRINE HCL 10 MG
5 TABLET ORAL 3 TIMES DAILY
Status: DISCONTINUED | OUTPATIENT
Start: 2023-01-07 | End: 2023-01-10 | Stop reason: HOSPADM

## 2023-01-07 RX ORDER — LIDOCAINE 40 MG/G
1 CREAM TOPICAL AS NEEDED
Status: DISCONTINUED | OUTPATIENT
Start: 2023-01-07 | End: 2023-01-10 | Stop reason: HOSPADM

## 2023-01-07 RX ADMIN — SERTRALINE 50 MG: 50 TABLET, FILM COATED ORAL at 20:13

## 2023-01-07 RX ADMIN — DIGOXIN 125 MCG: 125 TABLET ORAL at 11:00

## 2023-01-07 RX ADMIN — GABAPENTIN 600 MG: 300 CAPSULE ORAL at 20:14

## 2023-01-07 RX ADMIN — CYANOCOBALAMIN TAB 1000 MCG 1000 MCG: 1000 TAB at 08:41

## 2023-01-07 RX ADMIN — SILDENAFIL CITRATE 20 MG: 20 TABLET ORAL at 13:39

## 2023-01-07 RX ADMIN — METOCLOPRAMIDE 5 MG: 5 TABLET ORAL at 08:41

## 2023-01-07 RX ADMIN — ONDANSETRON 4 MG: 2 INJECTION INTRAMUSCULAR; INTRAVENOUS at 20:15

## 2023-01-07 RX ADMIN — CYCLOBENZAPRINE 5 MG: 10 TABLET, FILM COATED ORAL at 17:35

## 2023-01-07 RX ADMIN — FAMOTIDINE 20 MG: 20 TABLET, FILM COATED ORAL at 08:41

## 2023-01-07 RX ADMIN — Medication 10 ML: at 20:13

## 2023-01-07 RX ADMIN — GABAPENTIN 600 MG: 300 CAPSULE ORAL at 08:40

## 2023-01-07 RX ADMIN — TIZANIDINE 2 MG: 2 TABLET ORAL at 00:35

## 2023-01-07 RX ADMIN — APIXABAN 5 MG: 5 TABLET, FILM COATED ORAL at 08:41

## 2023-01-07 RX ADMIN — APIXABAN 5 MG: 5 TABLET, FILM COATED ORAL at 20:14

## 2023-01-07 RX ADMIN — SILDENAFIL CITRATE 20 MG: 20 TABLET ORAL at 05:45

## 2023-01-07 RX ADMIN — ALUMINUM HYDROXIDE, MAGNESIUM HYDROXIDE, AND DIMETHICONE 15 ML: 400; 400; 40 SUSPENSION ORAL at 20:42

## 2023-01-07 RX ADMIN — POLYETHYLENE GLYCOL 3350 17 G: 17 POWDER, FOR SOLUTION ORAL at 08:40

## 2023-01-07 RX ADMIN — GABAPENTIN 600 MG: 300 CAPSULE ORAL at 16:51

## 2023-01-07 RX ADMIN — Medication 10 ML: at 08:44

## 2023-01-07 RX ADMIN — HYDROCODONE BITARTRATE AND ACETAMINOPHEN 1 TABLET: 7.5; 325 TABLET ORAL at 21:59

## 2023-01-07 RX ADMIN — METOCLOPRAMIDE 5 MG: 5 TABLET ORAL at 11:00

## 2023-01-07 RX ADMIN — HYDROCODONE BITARTRATE AND ACETAMINOPHEN 1 TABLET: 7.5; 325 TABLET ORAL at 15:34

## 2023-01-07 RX ADMIN — LEVOTHYROXINE SODIUM 88 MCG: 0.09 TABLET ORAL at 05:45

## 2023-01-07 RX ADMIN — Medication 5 MG: at 02:26

## 2023-01-07 RX ADMIN — ROPINIROLE HYDROCHLORIDE 0.25 MG: 0.25 TABLET, FILM COATED ORAL at 20:14

## 2023-01-07 RX ADMIN — SILDENAFIL CITRATE 20 MG: 20 TABLET ORAL at 21:59

## 2023-01-07 RX ADMIN — PANTOPRAZOLE SODIUM 40 MG: 40 TABLET, DELAYED RELEASE ORAL at 11:00

## 2023-01-07 RX ADMIN — HYDROCODONE BITARTRATE AND ACETAMINOPHEN 1 TABLET: 7.5; 325 TABLET ORAL at 05:45

## 2023-01-07 RX ADMIN — METOCLOPRAMIDE 5 MG: 5 TABLET ORAL at 16:51

## 2023-01-07 RX ADMIN — BISACODYL 10 MG: 10 SUPPOSITORY RECTAL at 08:43

## 2023-01-07 RX ADMIN — GABAPENTIN 600 MG: 300 CAPSULE ORAL at 11:00

## 2023-01-07 RX ADMIN — ATORVASTATIN CALCIUM 10 MG: 10 TABLET, FILM COATED ORAL at 20:14

## 2023-01-07 RX ADMIN — METOPROLOL SUCCINATE 50 MG: 50 TABLET, EXTENDED RELEASE ORAL at 02:44

## 2023-01-07 NOTE — PLAN OF CARE
Goal Outcome Evaluation:         Will continue to monitor pt for low SpO2 and confusion. Pt slept most of day but is impulsive when awakened and disoriented, is very redirectable but only Aox1 this am, but was better thru the day. Will monitor pt for dehydration as she has had at least 6 cases of diarrhea today, first one was a watery stool of 200ml and bright yellow, then they became less in amount but still yellow, last one at 1800 was thicker and still only about 50ml.  Ordered ct with contrast, still awaiting contrast to arrive. Pt is now complaining of extreme leg pain and cramps as she is more awake, will give pain meds as ordered. Will monitor for pts impulsivity of jumping up to go to bathroom and get out of bed as she is not AO to situation. Will continue to monitor distended abdomen and it's hard as well.

## 2023-01-07 NOTE — PLAN OF CARE
Pt presenting with multiple diarrhea episodes this shift secondary to constipation and laxatives on previous shift.  Pt underwent KUB of Abdomen, negative for constipation.  Pt underwent CT of Abdomen and Pelvis negative for any acute issues.  Pt remains in A-Fib, treated as appropriate (see earlier note).  Pt denies any reports or complaints of dizziness this shift.  Pt confused to situation at times, pt sundowning this shift, sitter at bedside, fall precautions in place.  Pt continues with restless leg syndrome, treated appropriately per MAR.  Will continue to monitor and observe.     Goal Outcome Evaluation:  Plan of Care Reviewed With: patient        Progress: no change      Problem: Adult Inpatient Plan of Care  Goal: Plan of Care Review  Outcome: Ongoing, Progressing  Flowsheets (Taken 1/7/2023 0752)  Progress: no change  Plan of Care Reviewed With: patient     Problem: Fall Injury Risk  Goal: Absence of Fall and Fall-Related Injury  Outcome: Ongoing, Progressing     Problem: Pain Acute  Goal: Acceptable Pain Control and Functional Ability  Outcome: Ongoing, Progressing     Problem: Dysrhythmia  Goal: Normalized Cardiac Rhythm  Outcome: Ongoing, Progressing     Problem: Skin Injury Risk Increased  Goal: Skin Health and Integrity  Outcome: Ongoing, Progressing

## 2023-01-07 NOTE — PROGRESS NOTES
[facility] Medicine Services Daily Progress Note    Patient Name: Catalina Moreno  : 1940  MRN: [id number]  Primary Care Physician:  Rubio Dover MD  Date of admission: 2023      Subjective      Chief Complaint: Tachyarrhythmia nausea        Patient Reports she is having profound back pain.  States this is chronic.  Already on high-dose gabapentin.  Flexeril added.  Lidocaine cream added.  Had 2 small bowel movements yesterday.     ROS negative except as above.    Objective      Vitals:   Temp:  [97.3 °F (36.3 °C)-98.2 °F (36.8 °C)] 97.8 °F (36.6 °C)  Heart Rate:  [] 101  Resp:  [9-19] 11  BP: ()/(41-93) 115/93  Flow (L/min):  [2-4] 2    Physical Exam  Vitals reviewed.   Constitutional:       Comments: Nurse at bedside  Patient wearing nasal cannula   HENT:      Head: Normocephalic.      Nose: Nose normal.      Mouth/Throat:      Mouth: Mucous membranes are moist.   Cardiovascular:      Rate and Rhythm: Tachycardia present. Rhythm irregular.      Pulses: Normal pulses.      Heart sounds: Normal heart sounds.   Pulmonary:      Effort: Pulmonary effort is normal.      Breath sounds: Normal breath sounds.   Abdominal:      General: Abdomen is flat.      Palpations: Abdomen is soft.      Comments: Fullness in abdomen but not tender   Musculoskeletal:         General: Normal range of motion.      Cervical back: Normal range of motion.   Skin:     General: Skin is warm.   Neurological:      General: No focal deficit present.      Mental Status: She is alert and oriented to person, place, and time.   Psychiatric:         Mood and Affect: Mood normal.         Behavior: Behavior normal.     Result Review    Result Review:  I have personally reviewed the results from the time of this admission to 2023 18:21 EST and agree with these findings:  [x]  Laboratory  []  Microbiology  []  Radiology  []  EKG/Telemetry   []  Cardiology/Vascular   []  Pathology  []  Old  records  []  Other:  Most notable findings include: Hemoglobin 9.4         Assessment & Plan       Brief Patient Summary:  Catalina Moreno is a 82 y.o. female who presents with A. fib with RVR        apixaban, 5 mg, Oral, Q12H  atorvastatin, 10 mg, Oral, Nightly  digoxin, 125 mcg, Oral, Daily  famotidine, 20 mg, Oral, Daily  gabapentin, 600 mg, Oral, 4x Daily  levothyroxine, 88 mcg, Oral, Daily  metoclopramide, 5 mg, Oral, TID AC  metoprolol succinate XL, 50 mg, Oral, Q24H  pantoprazole, 40 mg, Oral, Daily With Lunch  polyethylene glycol, 17 g, Oral, Daily  rOPINIRole, 0.25 mg, Oral, Nightly  sertraline, 50 mg, Oral, Nightly  sildenafil, 20 mg, Oral, Q8H  sodium chloride, 10 mL, Intravenous, Q12H  cyanocobalamin, 1,000 mcg, Oral, Daily               Active Hospital Problems:          Active Hospital Problems     Diagnosis     • **Atrial fibrillation with rapid ventricular response (HCC)     • Chronic diastolic CHF (congestive heart failure) (HCC)     • Generalized anxiety disorder     • Restless legs syndrome     • Stage 2 chronic kidney disease     • Pulmonary hypertension (HCC)     • Chronic low back pain     • Depressive disorder     • Primary fibromyalgia syndrome     • Gastroesophageal reflux disease     • Essential hypertension     • Hypothyroidism        Plan:      Atrial fibrillation with rapid ventricular response  -chest x-ray showed stable cardiomegaly; elevated right hemidiaphragm; no acute process in the chest  -EKG showed A. fib with rate of 153 initially  -Digoxin level 0.3  -Cardizem drip  -Echo ordered  -Cardiology consulted  -Cardizem drip weaned off  -Heart rate now controlled     Chronic diastolic CHF (congestive heart failure)   -proBNP 9 9074, baseline 2469  -EF 45% on 3/5/2022  -Daily weights  -2000 mL p.o. fluid restriction  -Strict intake and output     Chronic low back pain  Primary fibromyalgia syndrome  Started on Flexeril already on gabapentin and lidocaine cream added     Essential  hypertension  -BP controlled     Pulmonary hypertension     Stage 2 chronic kidney disease  -Creatinine 1.19, baseline 0.91  -GFR 46, baseline 66.1     Restless legs syndrome     Depressive disorder  Generalized anxiety disorder  -Stable     Gastroesophageal reflux disease     Constipation  MiraLAX ordered  Had a bowel movement January 6  CT abdomen pelvis normal despite distention     Hypothyroidism     DVT prophylaxis:  Medical DVT prophylaxis orders are present.     CODE STATUS:    Code Status (Patient has no pulse and is not breathing): CPR (Attempt to Resuscitate)  Medical Interventions (Patient has pulse or is breathing): Full Support     Admission Status:  I believe this patient meets inpatient status.     I discussed the patient's findings and my recommendations with patient.     This patient has been examined wearing appropriate Personal Protective Equipment      01/07/23      Electronically signed by Dom Murray MD, 01/07/23, 18:21 EST.  [facility]ist Team

## 2023-01-07 NOTE — PLAN OF CARE
Goal Outcome Evaluation:  Plan of Care Reviewed With: patient        Progress: improving   Pt a&ox3, sitter remains at bedside, pt resting and denies any pain or needs, call light in reach

## 2023-01-07 NOTE — NURSING NOTE
Pt admitted to hospital with A-Fib with RVR, pt previously on Cardizem drip.  At around 0200, pt's heart rate began fluctuating between 90's to 140's, pt administered oral dose of Metoprolol 50 mg early, intervention somewhat effective, heart rate continuing to fluctuate between 90's and high 110's with occasional spike into the 130's.  APRN Hospitalist advised of situation, no new orders, continue to monitor and have cardiology reconsider on restarting some of her other home cardiology meds.

## 2023-01-08 LAB
ALBUMIN SERPL-MCNC: 3.6 G/DL (ref 3.5–5.2)
ALBUMIN/GLOB SERPL: 1.6 G/DL
ALP SERPL-CCNC: 87 U/L (ref 39–117)
ALT SERPL W P-5'-P-CCNC: 16 U/L (ref 1–33)
ANION GAP SERPL CALCULATED.3IONS-SCNC: 7 MMOL/L (ref 5–15)
AST SERPL-CCNC: 25 U/L (ref 1–32)
BASOPHILS # BLD AUTO: 0.1 10*3/MM3 (ref 0–0.2)
BASOPHILS NFR BLD AUTO: 1.3 % (ref 0–1.5)
BILIRUB SERPL-MCNC: 0.7 MG/DL (ref 0–1.2)
BUN SERPL-MCNC: 20 MG/DL (ref 8–23)
BUN/CREAT SERPL: 20.2 (ref 7–25)
CALCIUM SPEC-SCNC: 8.3 MG/DL (ref 8.6–10.5)
CHLORIDE SERPL-SCNC: 104 MMOL/L (ref 98–107)
CO2 SERPL-SCNC: 27 MMOL/L (ref 22–29)
CREAT SERPL-MCNC: 0.99 MG/DL (ref 0.57–1)
DEPRECATED RDW RBC AUTO: 56.9 FL (ref 37–54)
EGFRCR SERPLBLD CKD-EPI 2021: 57.4 ML/MIN/1.73
EOSINOPHIL # BLD AUTO: 0.2 10*3/MM3 (ref 0–0.4)
EOSINOPHIL NFR BLD AUTO: 4.5 % (ref 0.3–6.2)
ERYTHROCYTE [DISTWIDTH] IN BLOOD BY AUTOMATED COUNT: 18 % (ref 12.3–15.4)
GLOBULIN UR ELPH-MCNC: 2.2 GM/DL
GLUCOSE SERPL-MCNC: 93 MG/DL (ref 65–99)
HCT VFR BLD AUTO: 27.8 % (ref 34–46.6)
HGB BLD-MCNC: 8.8 G/DL (ref 12–15.9)
LYMPHOCYTES # BLD AUTO: 1.4 10*3/MM3 (ref 0.7–3.1)
LYMPHOCYTES NFR BLD AUTO: 32.1 % (ref 19.6–45.3)
MAGNESIUM SERPL-MCNC: 2.2 MG/DL (ref 1.6–2.4)
MCH RBC QN AUTO: 26.6 PG (ref 26.6–33)
MCHC RBC AUTO-ENTMCNC: 31.4 G/DL (ref 31.5–35.7)
MCV RBC AUTO: 84.6 FL (ref 79–97)
MONOCYTES # BLD AUTO: 0.4 10*3/MM3 (ref 0.1–0.9)
MONOCYTES NFR BLD AUTO: 9.8 % (ref 5–12)
NEUTROPHILS NFR BLD AUTO: 2.3 10*3/MM3 (ref 1.7–7)
NEUTROPHILS NFR BLD AUTO: 52.3 % (ref 42.7–76)
NRBC BLD AUTO-RTO: 0.1 /100 WBC (ref 0–0.2)
PHOSPHATE SERPL-MCNC: 3.3 MG/DL (ref 2.5–4.5)
PLATELET # BLD AUTO: 154 10*3/MM3 (ref 140–450)
PMV BLD AUTO: 9.5 FL (ref 6–12)
POTASSIUM SERPL-SCNC: 4.6 MMOL/L (ref 3.5–5.2)
PROT SERPL-MCNC: 5.8 G/DL (ref 6–8.5)
RBC # BLD AUTO: 3.29 10*6/MM3 (ref 3.77–5.28)
SODIUM SERPL-SCNC: 138 MMOL/L (ref 136–145)
WBC NRBC COR # BLD: 4.3 10*3/MM3 (ref 3.4–10.8)

## 2023-01-08 PROCEDURE — 80053 COMPREHEN METABOLIC PANEL: CPT | Performed by: INTERNAL MEDICINE

## 2023-01-08 PROCEDURE — 83735 ASSAY OF MAGNESIUM: CPT | Performed by: NURSE PRACTITIONER

## 2023-01-08 PROCEDURE — 84100 ASSAY OF PHOSPHORUS: CPT | Performed by: INTERNAL MEDICINE

## 2023-01-08 PROCEDURE — 85025 COMPLETE CBC W/AUTO DIFF WBC: CPT | Performed by: INTERNAL MEDICINE

## 2023-01-08 RX ADMIN — GABAPENTIN 600 MG: 300 CAPSULE ORAL at 15:00

## 2023-01-08 RX ADMIN — METOCLOPRAMIDE 5 MG: 5 TABLET ORAL at 11:01

## 2023-01-08 RX ADMIN — Medication 5 MG: at 22:40

## 2023-01-08 RX ADMIN — LIDOCAINE 4% 1 APPLICATION: 4 CREAM TOPICAL at 22:43

## 2023-01-08 RX ADMIN — CYCLOBENZAPRINE 5 MG: 10 TABLET, FILM COATED ORAL at 15:00

## 2023-01-08 RX ADMIN — GABAPENTIN 600 MG: 300 CAPSULE ORAL at 22:41

## 2023-01-08 RX ADMIN — HYDROCODONE BITARTRATE AND ACETAMINOPHEN 1 TABLET: 7.5; 325 TABLET ORAL at 22:42

## 2023-01-08 RX ADMIN — ATORVASTATIN CALCIUM 10 MG: 10 TABLET, FILM COATED ORAL at 22:41

## 2023-01-08 RX ADMIN — CYANOCOBALAMIN TAB 1000 MCG 1000 MCG: 1000 TAB at 08:30

## 2023-01-08 RX ADMIN — CYCLOBENZAPRINE 5 MG: 10 TABLET, FILM COATED ORAL at 22:41

## 2023-01-08 RX ADMIN — FAMOTIDINE 20 MG: 20 TABLET, FILM COATED ORAL at 08:30

## 2023-01-08 RX ADMIN — Medication 10 ML: at 08:32

## 2023-01-08 RX ADMIN — APIXABAN 5 MG: 5 TABLET, FILM COATED ORAL at 22:40

## 2023-01-08 RX ADMIN — SILDENAFIL CITRATE 20 MG: 20 TABLET ORAL at 22:40

## 2023-01-08 RX ADMIN — SILDENAFIL CITRATE 20 MG: 20 TABLET ORAL at 11:01

## 2023-01-08 RX ADMIN — CYCLOBENZAPRINE 5 MG: 10 TABLET, FILM COATED ORAL at 08:30

## 2023-01-08 RX ADMIN — Medication 10 ML: at 22:40

## 2023-01-08 RX ADMIN — HYDROCODONE BITARTRATE AND ACETAMINOPHEN 1 TABLET: 7.5; 325 TABLET ORAL at 15:00

## 2023-01-08 RX ADMIN — GABAPENTIN 600 MG: 300 CAPSULE ORAL at 11:01

## 2023-01-08 RX ADMIN — METOCLOPRAMIDE 5 MG: 5 TABLET ORAL at 15:00

## 2023-01-08 RX ADMIN — LEVOTHYROXINE SODIUM 88 MCG: 0.09 TABLET ORAL at 06:19

## 2023-01-08 RX ADMIN — PANTOPRAZOLE SODIUM 40 MG: 40 TABLET, DELAYED RELEASE ORAL at 11:01

## 2023-01-08 RX ADMIN — SILDENAFIL CITRATE 20 MG: 20 TABLET ORAL at 06:19

## 2023-01-08 RX ADMIN — APIXABAN 5 MG: 5 TABLET, FILM COATED ORAL at 08:30

## 2023-01-08 RX ADMIN — DIGOXIN 125 MCG: 125 TABLET ORAL at 11:01

## 2023-01-08 RX ADMIN — METOCLOPRAMIDE 5 MG: 5 TABLET ORAL at 08:30

## 2023-01-08 RX ADMIN — POLYETHYLENE GLYCOL 3350 17 G: 17 POWDER, FOR SOLUTION ORAL at 08:30

## 2023-01-08 RX ADMIN — TIZANIDINE 2 MG: 2 TABLET ORAL at 22:41

## 2023-01-08 RX ADMIN — ROPINIROLE HYDROCHLORIDE 0.25 MG: 0.25 TABLET, FILM COATED ORAL at 22:42

## 2023-01-08 RX ADMIN — GABAPENTIN 600 MG: 300 CAPSULE ORAL at 08:30

## 2023-01-08 RX ADMIN — METOPROLOL SUCCINATE 50 MG: 50 TABLET, EXTENDED RELEASE ORAL at 08:32

## 2023-01-08 RX ADMIN — BISACODYL 10 MG: 10 SUPPOSITORY RECTAL at 08:30

## 2023-01-08 NOTE — PLAN OF CARE
Goal Outcome Evaluation:  Plan of Care Reviewed With: patient        Progress: improving   Pt a/ox4, sitting up in recliner, jonah lower leg pain/aching controlled with po pain meds and po flexeril. Call light in reach, clean and dry

## 2023-01-08 NOTE — SIGNIFICANT NOTE
01/08/23 1646   OTHER   Discipline physical therapy assistant   Rehab Time/Intention   Session Not Performed patient/family declined, not feeling well  (Attempted in am and pt was nauseous, in pm pt very restless and anxious, now has sitter and afib frunning 97>151.)   Recommendation   PT - Next Appointment 01/09/23

## 2023-01-08 NOTE — PROGRESS NOTES
[facility] Medicine Services Daily Progress Note    Patient Name: Catalina Moreno  : 1940  MRN: [id number]  Primary Care Physician:  Rubio Dover MD  Date of admission: 2023      Subjective      Chief Complaint: Tachyarrhythmia nausea        Patient Reports she is having profound back pain.    Sleeping on and off.  Sitter at bedside.  Patient woke up to take all her medicines.     ROS negative except as above.    Objective      Vitals:   Temp:  [97.7 °F (36.5 °C)-98.6 °F (37 °C)] 97.7 °F (36.5 °C)  Heart Rate:  [] 105  Resp:  [9-19] 13  BP: ()/(41-93) 112/87  Flow (L/min):  [2-4] 3    Physical Exam  Vitals reviewed.   Constitutional:       Comments: Nurse at bedside  Patient wearing nasal cannula   HENT:      Head: Normocephalic.      Nose: Nose normal.      Mouth/Throat:      Mouth: Mucous membranes are moist.   Cardiovascular:      Rate and Rhythm: Tachycardia resolved. Rhythm irregular.      Pulses: Normal pulses.      Heart sounds: Normal heart sounds.   Pulmonary:      Effort: Pulmonary effort is normal.      Breath sounds: Normal breath sounds.   Abdominal:      General: Abdomen is flat.      Palpations: Abdomen is soft.      Comments: Fullness in abdomen but not tender   Musculoskeletal:         General: Normal range of motion.      Cervical back: Normal range of motion.   Skin:     General: Skin is warm.   Neurological:      General: No focal deficit present.      Mental Status: She is alert and oriented to person, place, and time.   Psychiatric:         Mood and Affect: Mood normal.         Behavior: Behavior normal.     Result Review    Result Review:  I have personally reviewed the results from the time of this admission to 2023 16:09 EST and agree with these findings:  [x]  Laboratory  []  Microbiology  []  Radiology  []  EKG/Telemetry   []  Cardiology/Vascular   []  Pathology  []  Old records  []  Other:  Most notable findings include:  Hemoglobin 8.8       Assessment & Plan       Brief Patient Summary:  Catalina Moreno is a 82 y.o. female who presents with A. fib with RVR        apixaban, 5 mg, Oral, Q12H  atorvastatin, 10 mg, Oral, Nightly  digoxin, 125 mcg, Oral, Daily  famotidine, 20 mg, Oral, Daily  gabapentin, 600 mg, Oral, 4x Daily  levothyroxine, 88 mcg, Oral, Daily  metoclopramide, 5 mg, Oral, TID AC  metoprolol succinate XL, 50 mg, Oral, Q24H  pantoprazole, 40 mg, Oral, Daily With Lunch  polyethylene glycol, 17 g, Oral, Daily  rOPINIRole, 0.25 mg, Oral, Nightly  sertraline, 50 mg, Oral, Nightly  sildenafil, 20 mg, Oral, Q8H  sodium chloride, 10 mL, Intravenous, Q12H  cyanocobalamin, 1,000 mcg, Oral, Daily               Active Hospital Problems:          Active Hospital Problems     Diagnosis     • **Atrial fibrillation with rapid ventricular response (HCC)     • Chronic diastolic CHF (congestive heart failure) (HCC)     • Generalized anxiety disorder     • Restless legs syndrome     • Stage 2 chronic kidney disease     • Pulmonary hypertension (HCC)     • Chronic low back pain     • Depressive disorder     • Primary fibromyalgia syndrome     • Gastroesophageal reflux disease     • Essential hypertension     • Hypothyroidism        Plan:   Atrial fibrillation with rapid ventricular response  -chest x-ray showed stable cardiomegaly; elevated right hemidiaphragm; no acute process in the chest  -EKG showed A. fib with rate of 153 initially  -Digoxin level 0.3  -Cardizem drip  -Echo ordered  -Cardiology consulted  -Cardizem drip weaned off  -Heart rate now controlled     Chronic diastolic CHF (congestive heart failure)   -proBNP 9 9074, baseline 2469  -EF 45% on 3/5/2022  -Daily weights  -2000 mL p.o. fluid restriction  -Strict intake and output     Chronic low back pain  Primary fibromyalgia syndrome  Started on Flexeril already on gabapentin and lidocaine cream added     Essential hypertension  -BP controlled     Pulmonary  hypertension     Stage 2 chronic kidney disease  -Creatinine 1.19, baseline 0.91  -GFR 46, baseline 66.1     Restless legs syndrome     Depressive disorder  Generalized anxiety disorder  -Stable     Gastroesophageal reflux disease     Constipation  MiraLAX ordered  Had a bowel movement January 6  CT abdomen pelvis normal despite distention     Hypothyroidism     DVT prophylaxis:  Medical DVT prophylaxis orders are present.     CODE STATUS:    Code Status (Patient has no pulse and is not breathing): CPR (Attempt to Resuscitate)  Medical Interventions (Patient has pulse or is breathing): Full Support     Admission Status:  I believe this patient meets inpatient status.     I discussed the patient's findings and my recommendations with patient.     This patient has been examined wearing appropriate Personal Protective Equipment      01/08/23      Electronically signed by Dom Murray MD, 01/08/23, 16:09 EST.  [facility]ist Team

## 2023-01-08 NOTE — PLAN OF CARE
Pt continues to have restless legs and pain in bilateral extremities this shift, pt administered PRN Norco with success thus far, pt resting well thus far.  Pt remains in A-Fib, heart rate fluctuating high 90's to 110's when in pain, heart rate within normal limits upon resting, continuing to monitor for fluctuations in heart rate.  Pt denies any reports or complaints of chest pain, difficulty breathing, or shortness of breath.  Pt up with assist x1 to bathroom with walker, no adverse shortness of breath noted.  Pt awaiting rehab placement.  Pt resting well thus far, will continue to monitor and observe.      Goal Outcome Evaluation:  Plan of Care Reviewed With: patient        Progress: improving      Problem: Adult Inpatient Plan of Care  Goal: Plan of Care Review  Outcome: Ongoing, Progressing  Flowsheets (Taken 1/8/2023 0031)  Progress: improving  Plan of Care Reviewed With: patient     Problem: Pain Acute  Goal: Acceptable Pain Control and Functional Ability  Outcome: Ongoing, Progressing     Problem: Dysrhythmia  Goal: Normalized Cardiac Rhythm  Outcome: Ongoing, Progressing     Problem: Skin Injury Risk Increased  Goal: Skin Health and Integrity  Outcome: Ongoing, Progressing

## 2023-01-09 LAB
ALBUMIN SERPL-MCNC: 3.1 G/DL (ref 3.5–5.2)
ALBUMIN/GLOB SERPL: 1.2 G/DL
ALP SERPL-CCNC: 113 U/L (ref 39–117)
ALT SERPL W P-5'-P-CCNC: 17 U/L (ref 1–33)
ANION GAP SERPL CALCULATED.3IONS-SCNC: 6 MMOL/L (ref 5–15)
AST SERPL-CCNC: 28 U/L (ref 1–32)
BASOPHILS # BLD AUTO: 0 10*3/MM3 (ref 0–0.2)
BASOPHILS NFR BLD AUTO: 0.9 % (ref 0–1.5)
BILIRUB SERPL-MCNC: 0.5 MG/DL (ref 0–1.2)
BUN SERPL-MCNC: 17 MG/DL (ref 8–23)
BUN/CREAT SERPL: 13.7 (ref 7–25)
CA-I SERPL ISE-MCNC: 1.13 MMOL/L (ref 1.2–1.3)
CALCIUM SPEC-SCNC: 8 MG/DL (ref 8.6–10.5)
CHLORIDE SERPL-SCNC: 106 MMOL/L (ref 98–107)
CO2 SERPL-SCNC: 29 MMOL/L (ref 22–29)
CREAT SERPL-MCNC: 1.24 MG/DL (ref 0.57–1)
DEPRECATED RDW RBC AUTO: 54.7 FL (ref 37–54)
EGFRCR SERPLBLD CKD-EPI 2021: 43.8 ML/MIN/1.73
EOSINOPHIL # BLD AUTO: 0.2 10*3/MM3 (ref 0–0.4)
EOSINOPHIL NFR BLD AUTO: 4.4 % (ref 0.3–6.2)
ERYTHROCYTE [DISTWIDTH] IN BLOOD BY AUTOMATED COUNT: 18 % (ref 12.3–15.4)
GLOBULIN UR ELPH-MCNC: 2.5 GM/DL
GLUCOSE SERPL-MCNC: 104 MG/DL (ref 65–99)
HCT VFR BLD AUTO: 28.2 % (ref 34–46.6)
HGB BLD-MCNC: 8.7 G/DL (ref 12–15.9)
LYMPHOCYTES # BLD AUTO: 1.4 10*3/MM3 (ref 0.7–3.1)
LYMPHOCYTES NFR BLD AUTO: 37.6 % (ref 19.6–45.3)
MAGNESIUM SERPL-MCNC: 2 MG/DL (ref 1.6–2.4)
MCH RBC QN AUTO: 26.3 PG (ref 26.6–33)
MCHC RBC AUTO-ENTMCNC: 30.8 G/DL (ref 31.5–35.7)
MCV RBC AUTO: 85.4 FL (ref 79–97)
MONOCYTES # BLD AUTO: 0.4 10*3/MM3 (ref 0.1–0.9)
MONOCYTES NFR BLD AUTO: 10.5 % (ref 5–12)
NEUTROPHILS NFR BLD AUTO: 1.8 10*3/MM3 (ref 1.7–7)
NEUTROPHILS NFR BLD AUTO: 46.6 % (ref 42.7–76)
NRBC BLD AUTO-RTO: 0.1 /100 WBC (ref 0–0.2)
PHOSPHATE SERPL-MCNC: 3.8 MG/DL (ref 2.5–4.5)
PLATELET # BLD AUTO: 155 10*3/MM3 (ref 140–450)
PMV BLD AUTO: 9.1 FL (ref 6–12)
POTASSIUM SERPL-SCNC: 4.7 MMOL/L (ref 3.5–5.2)
PROT SERPL-MCNC: 5.6 G/DL (ref 6–8.5)
RBC # BLD AUTO: 3.3 10*6/MM3 (ref 3.77–5.28)
SODIUM SERPL-SCNC: 141 MMOL/L (ref 136–145)
WBC NRBC COR # BLD: 3.8 10*3/MM3 (ref 3.4–10.8)

## 2023-01-09 PROCEDURE — 82330 ASSAY OF CALCIUM: CPT | Performed by: INTERNAL MEDICINE

## 2023-01-09 PROCEDURE — 97116 GAIT TRAINING THERAPY: CPT

## 2023-01-09 PROCEDURE — 36415 COLL VENOUS BLD VENIPUNCTURE: CPT | Performed by: INTERNAL MEDICINE

## 2023-01-09 PROCEDURE — 97110 THERAPEUTIC EXERCISES: CPT

## 2023-01-09 PROCEDURE — 84100 ASSAY OF PHOSPHORUS: CPT | Performed by: INTERNAL MEDICINE

## 2023-01-09 PROCEDURE — 83735 ASSAY OF MAGNESIUM: CPT | Performed by: NURSE PRACTITIONER

## 2023-01-09 PROCEDURE — 85025 COMPLETE CBC W/AUTO DIFF WBC: CPT | Performed by: INTERNAL MEDICINE

## 2023-01-09 PROCEDURE — 80053 COMPREHEN METABOLIC PANEL: CPT | Performed by: INTERNAL MEDICINE

## 2023-01-09 RX ADMIN — Medication 5 MG: at 20:28

## 2023-01-09 RX ADMIN — SILDENAFIL CITRATE 20 MG: 20 TABLET ORAL at 17:00

## 2023-01-09 RX ADMIN — PANTOPRAZOLE SODIUM 40 MG: 40 TABLET, DELAYED RELEASE ORAL at 13:16

## 2023-01-09 RX ADMIN — GABAPENTIN 600 MG: 300 CAPSULE ORAL at 13:16

## 2023-01-09 RX ADMIN — CYCLOBENZAPRINE 5 MG: 10 TABLET, FILM COATED ORAL at 17:01

## 2023-01-09 RX ADMIN — SILDENAFIL CITRATE 20 MG: 20 TABLET ORAL at 20:28

## 2023-01-09 RX ADMIN — GABAPENTIN 600 MG: 300 CAPSULE ORAL at 08:41

## 2023-01-09 RX ADMIN — CYANOCOBALAMIN TAB 1000 MCG 1000 MCG: 1000 TAB at 08:41

## 2023-01-09 RX ADMIN — METOCLOPRAMIDE 5 MG: 5 TABLET ORAL at 08:41

## 2023-01-09 RX ADMIN — APIXABAN 5 MG: 5 TABLET, FILM COATED ORAL at 08:41

## 2023-01-09 RX ADMIN — HYDROCODONE BITARTRATE AND ACETAMINOPHEN 1 TABLET: 7.5; 325 TABLET ORAL at 05:24

## 2023-01-09 RX ADMIN — GABAPENTIN 600 MG: 300 CAPSULE ORAL at 20:28

## 2023-01-09 RX ADMIN — CYCLOBENZAPRINE 5 MG: 10 TABLET, FILM COATED ORAL at 08:41

## 2023-01-09 RX ADMIN — SILDENAFIL CITRATE 20 MG: 20 TABLET ORAL at 05:24

## 2023-01-09 RX ADMIN — TIZANIDINE 2 MG: 2 TABLET ORAL at 19:06

## 2023-01-09 RX ADMIN — METOCLOPRAMIDE 5 MG: 5 TABLET ORAL at 13:16

## 2023-01-09 RX ADMIN — CYCLOBENZAPRINE 5 MG: 10 TABLET, FILM COATED ORAL at 20:28

## 2023-01-09 RX ADMIN — ATORVASTATIN CALCIUM 10 MG: 10 TABLET, FILM COATED ORAL at 20:28

## 2023-01-09 RX ADMIN — ROPINIROLE HYDROCHLORIDE 0.25 MG: 0.25 TABLET, FILM COATED ORAL at 20:28

## 2023-01-09 RX ADMIN — SERTRALINE 50 MG: 50 TABLET, FILM COATED ORAL at 20:28

## 2023-01-09 RX ADMIN — Medication 10 ML: at 20:29

## 2023-01-09 RX ADMIN — POLYETHYLENE GLYCOL 3350 17 G: 17 POWDER, FOR SOLUTION ORAL at 08:40

## 2023-01-09 RX ADMIN — DIGOXIN 125 MCG: 125 TABLET ORAL at 13:16

## 2023-01-09 RX ADMIN — HYDROCODONE BITARTRATE AND ACETAMINOPHEN 1 TABLET: 7.5; 325 TABLET ORAL at 16:55

## 2023-01-09 RX ADMIN — METOCLOPRAMIDE 5 MG: 5 TABLET ORAL at 17:01

## 2023-01-09 RX ADMIN — LEVOTHYROXINE SODIUM 88 MCG: 0.09 TABLET ORAL at 05:24

## 2023-01-09 RX ADMIN — APIXABAN 5 MG: 5 TABLET, FILM COATED ORAL at 20:28

## 2023-01-09 RX ADMIN — Medication 10 ML: at 08:44

## 2023-01-09 RX ADMIN — GABAPENTIN 600 MG: 300 CAPSULE ORAL at 17:00

## 2023-01-09 RX ADMIN — FAMOTIDINE 20 MG: 20 TABLET, FILM COATED ORAL at 08:41

## 2023-01-09 NOTE — PLAN OF CARE
Goal Outcome Evaluation:Patient pleasant and cooperative. No sitter needed so discontinued earlier today. Heartrate currently 108 while patient sitting up in bed eating. Will continue to monitor.

## 2023-01-09 NOTE — PROGRESS NOTES
[facility] Medicine Services Daily Progress Note    Patient Name: Catalina Moreno  : 1940  MRN: [id number]  Primary Care Physician:  Rubio Dover MD  Date of admission: 2023      Subjective      Chief Complaint: Tachyarrhythmia nausea     Patient Reports she is much closer to baseline now.  Still somewhat weak wants to get a little stronger before going home.  Refusing rehab.     ROS negative except as above.    Objective      Vitals:   Temp:  [97.3 °F (36.3 °C)-98.4 °F (36.9 °C)] 97.5 °F (36.4 °C)  Heart Rate:  [] 73  Resp:  [8-15] 13  BP: ()/(38-75) 114/57  Flow (L/min):  [2-3] 2  Physical Exam  Vitals reviewed.   Constitutional:       Comments: Patient sitting in the chair today appears well.  Patient wearing nasal cannula   HENT:      Head: Normocephalic.      Nose: Nose normal.      Mouth/Throat:      Mouth: Mucous membranes are moist.   Cardiovascular:      Rate and Rhythm: Tachycardia resolved. Rhythm irregular.      Pulses: Normal pulses.      Heart sounds: Normal heart sounds.   Pulmonary:      Effort: Pulmonary effort is normal.      Breath sounds: Normal breath sounds.   Abdominal:      General: Abdomen is flat.      Palpations: Abdomen is soft.      Comments: Fullness in abdomen but not tender   Musculoskeletal:         General: Normal range of motion.      Cervical back: Normal range of motion.   Skin:     General: Skin is warm.   Neurological:      General: No focal deficit present.      Mental Status: She is alert and oriented to person, place, and time.   Psychiatric:         Mood and Affect: Mood normal.         Behavior: Behavior normal.        Result Review    Result Review:  I have personally reviewed the results from the time of this admission to 2023 16:11 EST and agree with these findings:  [x]  Laboratory  []  Microbiology  []  Radiology  []  EKG/Telemetry   []  Cardiology/Vascular   []  Pathology  []  Old records  []   Other:  Most notable findings include: Hypocalcemia       Assessment & Plan       Brief Patient Summary:  Catalina Moreno is a 82 y.o. female who presents with A. fib with RVR        apixaban, 5 mg, Oral, Q12H  atorvastatin, 10 mg, Oral, Nightly  digoxin, 125 mcg, Oral, Daily  famotidine, 20 mg, Oral, Daily  gabapentin, 600 mg, Oral, 4x Daily  levothyroxine, 88 mcg, Oral, Daily  metoclopramide, 5 mg, Oral, TID AC  metoprolol succinate XL, 50 mg, Oral, Q24H  pantoprazole, 40 mg, Oral, Daily With Lunch  polyethylene glycol, 17 g, Oral, Daily  rOPINIRole, 0.25 mg, Oral, Nightly  sertraline, 50 mg, Oral, Nightly  sildenafil, 20 mg, Oral, Q8H  sodium chloride, 10 mL, Intravenous, Q12H  cyanocobalamin, 1,000 mcg, Oral, Daily               Active Hospital Problems:          Active Hospital Problems     Diagnosis     • **Atrial fibrillation with rapid ventricular response (HCC)     • Chronic diastolic CHF (congestive heart failure) (HCC)     • Generalized anxiety disorder     • Restless legs syndrome     • Stage 2 chronic kidney disease     • Pulmonary hypertension (HCC)     • Chronic low back pain     • Depressive disorder     • Primary fibromyalgia syndrome     • Gastroesophageal reflux disease     • Essential hypertension     • Hypothyroidism        Plan:   Atrial fibrillation with rapid ventricular response  -chest x-ray showed stable cardiomegaly; elevated right hemidiaphragm; no acute process in the chest  -EKG showed A. fib with rate of 153 initially  -Digoxin level 0.3  -Cardizem drip  -Echo ordered  -Cardiology consulted  -Cardizem drip weaned off  -Heart rate now controlled     Chronic diastolic CHF (congestive heart failure)   -proBNP 9 9074, baseline 2469  -EF 45% on 3/5/2022  -Daily weights  -2000 mL p.o. fluid restriction  -Strict intake and output     Chronic low back pain  Primary fibromyalgia syndrome  Started on Flexeril already on gabapentin and lidocaine cream added     Essential hypertension  -BP  controlled     Pulmonary hypertension     Stage 2 chronic kidney disease  -Creatinine 1.19, baseline 0.91  -GFR 46, baseline 66.1     Restless legs syndrome     Depressive disorder  Generalized anxiety disorder  -Stable     Gastroesophageal reflux disease     Constipation  MiraLAX ordered  Had a bowel movement January 6  CT abdomen pelvis normal despite distention      Patient refusing rehab.  Hopefully home with home health tomorrow.     Hypothyroidism     DVT prophylaxis:  Medical DVT prophylaxis orders are present.     CODE STATUS:    Code Status (Patient has no pulse and is not breathing): CPR (Attempt to Resuscitate)  Medical Interventions (Patient has pulse or is breathing): Full Support     Admission Status:  I believe this patient meets inpatient status.     I discussed the patient's findings and my recommendations with patient.     This patient has been examined wearing appropriate Personal Protective Equipment     01/09/23      Electronically signed by Dom Murray MD, 01/09/23, 16:11 EST.  [facility]ist Team

## 2023-01-09 NOTE — THERAPY TREATMENT NOTE
"Subjective: Pt agreeable to therapeutic plan of care.      Objective:     Bed mobility - CGA  Transfers - CGA and with rolling walker  Ambulation - 80 feet x 2 CGA and Min-A w rw; impulsive pace; varies direction often and markedly while amb.    Self care: had pt brush her hair; able to do so independently.    Therapeutic ex: sitting hip flexion, laq, ankle pumps, quad sets, glut sets, BUE flexion, all x 20 reps each. Also UE coordination exercises x 10 reps each.    Vitals: WNL    Pain: 0 VAS   Location: n/a  Intervention for pain: Repositioned, Increased Activity and Therapeutic Presence    Education: Provided education on the importance of mobility in the acute care setting, ADL training, Transfer Training and Gait Training    Assessment: Catalina Moreno presents with functional mobility impairments which indicate the need for skilled intervention. Tolerating session today without incident. Will continue to follow and progress as tolerated.     Plan/Recommendations:   Moderate Intensity Therapy recommended post-acute care. This is recommended as therapy feels the patient would require 3-4 days per week and wouldn't tolerate \"3 hour daily\" rehab intensity. SNF would be the preferred choice. If the patient does not agree to SNF, arrange HH or OP depending on home bound status. If patient is medically complex, consider LTACH.. Pt requires no DME at discharge.     Pt desires Skilled Rehab placement at discharge. Pt cooperative; agreeable to therapeutic recommendations and plan of care.         Basic Mobility 6-click:  Rollin = Total, A lot = 2, A little = 3; 4 = None  Supine>Sit:   1 = Total, A lot = 2, A little = 3; 4 = None   Sit>Stand with arms:  1 = Total, A lot = 2, A little = 3; 4 = None  Bed>Chair:   1 = Total, A lot = 2, A little = 3; 4 = None  Ambulate in room:  1 = Total, A lot = 2, A little = 3; 4 = None  3-5 Steps with railin = Total, A lot = 2, A little = 3; 4 = None  Score: " 18    Modified [place]: 4 = Moderately severe disability (Unable to attend to own bodily needs without assistance, and unable to walk unassisted)     Post-Tx Position: Up in Chair, Staff Present and Call light and personal items within reach - sitter present  PPE: gloves and surgical mask

## 2023-01-09 NOTE — PLAN OF CARE
Problem: Adult Inpatient Plan of Care  Goal: Absence of Hospital-Acquired Illness or Injury  Intervention: Identify and Manage Fall Risk  Recent Flowsheet Documentation  Taken 1/9/2023 0410 by Malou Velazquez RN  Safety Promotion/Fall Prevention:   assistive device/personal items within reach   clutter free environment maintained   fall prevention program maintained   nonskid shoes/slippers when out of bed   safety round/check completed   room organization consistent  Taken 1/9/2023 0002 by Malou Velazquez RN  Safety Promotion/Fall Prevention:   assistive device/personal items within reach   clutter free environment maintained   nonskid shoes/slippers when out of bed   room organization consistent   safety round/check completed   lighting adjusted   fall prevention program maintained  Taken 1/8/2023 2002 by Malou Velazquez RN  Safety Promotion/Fall Prevention:   assistive device/personal items within reach   clutter free environment maintained   fall prevention program maintained   lighting adjusted   room organization consistent   safety round/check completed   nonskid shoes/slippers when out of bed  Intervention: Prevent Skin Injury  Recent Flowsheet Documentation  Taken 1/9/2023 0410 by Malou Velazquez RN  Skin Protection:   adhesive use limited   tubing/devices free from skin contact  Taken 1/9/2023 0002 by Malou Velazquez RN  Body Position:   position changed independently   side-lying   left  Taken 1/8/2023 2002 by Malou Velazquez RN  Body Position:   position changed independently   right   side-lying  Skin Protection:   adhesive use limited   tubing/devices free from skin contact  Intervention: Prevent and Manage VTE (Venous Thromboembolism) Risk  Recent Flowsheet Documentation  Taken 1/9/2023 0410 by Malou Velazquez RN  Activity Management: up to bedside commode  VTE Prevention/Management:   bilateral   sequential compression devices on  Range of Motion: ROM (range of motion)  performed  Taken 1/9/2023 0002 by Malou Velazquez RN  Activity Management:   activity adjusted per tolerance   ambulated in room   up to bedside commode  VTE Prevention/Management:   sequential compression devices on   bilateral  Taken 1/8/2023 2002 by Malou Velazquez RN  Activity Management:   activity adjusted per tolerance   up to bedside commode  Intervention: Prevent Infection  Recent Flowsheet Documentation  Taken 1/9/2023 0410 by Malou Velazquez RN  Infection Prevention:   hand hygiene promoted   rest/sleep promoted   single patient room provided  Taken 1/8/2023 2002 by Malou Velazquez RN  Infection Prevention:   rest/sleep promoted   single patient room provided   hand hygiene promoted   equipment surfaces disinfected   environmental surveillance performed  Goal: Optimal Comfort and Wellbeing  Intervention: Provide Person-Centered Care  Recent Flowsheet Documentation  Taken 1/9/2023 0410 by Malou Velazquez RN  Trust Relationship/Rapport:   care explained   questions answered   thoughts/feelings acknowledged  Taken 1/9/2023 0002 by Malou Velazquez RN  Trust Relationship/Rapport:   care explained   choices provided   thoughts/feelings acknowledged   questions answered   empathic listening provided   questions encouraged  Taken 1/8/2023 2002 by Malou Velazquez RN  Trust Relationship/Rapport:   care explained   choices provided   reassurance provided   questions answered   empathic listening provided   thoughts/feelings acknowledged     Problem: Fall Injury Risk  Goal: Absence of Fall and Fall-Related Injury  Intervention: Identify and Manage Contributors  Recent Flowsheet Documentation  Taken 1/9/2023 0410 by Malou Velazquez RN  Medication Review/Management: medications reviewed  Taken 1/9/2023 0002 by Malou Velazquez RN  Medication Review/Management: medications reviewed  Self-Care Promotion:   independence encouraged   BADL personal objects within reach   BADL personal routines  maintained  Taken 1/8/2023 2002 by Malou Velazquez RN  Medication Review/Management: medications reviewed  Self-Care Promotion:   independence encouraged   BADL personal objects within reach   BADL personal routines maintained  Intervention: Promote Injury-Free Environment  Recent Flowsheet Documentation  Taken 1/9/2023 0410 by Malou Velazquez RN  Safety Promotion/Fall Prevention:   assistive device/personal items within reach   clutter free environment maintained   fall prevention program maintained   nonskid shoes/slippers when out of bed   safety round/check completed   room organization consistent  Taken 1/9/2023 0002 by Malou Velazquez RN  Safety Promotion/Fall Prevention:   assistive device/personal items within reach   clutter free environment maintained   nonskid shoes/slippers when out of bed   room organization consistent   safety round/check completed   lighting adjusted   fall prevention program maintained  Taken 1/8/2023 2002 by Malou Velazquez RN  Safety Promotion/Fall Prevention:   assistive device/personal items within reach   clutter free environment maintained   fall prevention program maintained   lighting adjusted   room organization consistent   safety round/check completed   nonskid shoes/slippers when out of bed     Problem: Pain Acute  Goal: Acceptable Pain Control and Functional Ability  Intervention: Prevent or Manage Pain  Recent Flowsheet Documentation  Taken 1/9/2023 0410 by Malou Velazquez RN  Sleep/Rest Enhancement:   consistent schedule promoted   awakenings minimized   noise level reduced   relaxation techniques promoted   regular sleep/rest pattern promoted  Medication Review/Management: medications reviewed  Taken 1/9/2023 0002 by Malou Velazquez RN  Sensory Stimulation Regulation:   care clustered   television on   quiet environment promoted  Bowel Elimination Promotion:   commode/bedpan at bedside   diet adjusted   privacy promoted  Medication Review/Management:  medications reviewed  Taken 1/8/2023 2312 by Malou Velazquez RN  Sleep/Rest Enhancement:   awakenings minimized   noise level reduced   relaxation techniques promoted   room darkened  Taken 1/8/2023 2002 by Malou Velazquez RN  Sensory Stimulation Regulation:   lighting decreased   care clustered   television on   quiet environment promoted  Medication Review/Management: medications reviewed  Intervention: Optimize Psychosocial Wellbeing  Recent Flowsheet Documentation  Taken 1/9/2023 0410 by Malou Velazquez RN  Diversional Activities: television  Taken 1/9/2023 0002 by Malou Velazquez RN  Supportive Measures:   active listening utilized   verbalization of feelings encouraged   goal-setting facilitated   decision-making supported  Diversional Activities: television  Taken 1/8/2023 2002 by Malou Velazquez RN  Supportive Measures:   active listening utilized   verbalization of feelings encouraged   goal-setting facilitated   decision-making supported     Problem: Dysrhythmia  Goal: Normalized Cardiac Rhythm  Intervention: Monitor and Manage Cardiac Rhythm Effect  Recent Flowsheet Documentation  Taken 1/9/2023 0410 by Malou Velazquez RN  VTE Prevention/Management:   bilateral   sequential compression devices on  Taken 1/9/2023 0002 by Malou Velazquez RN  Stabilization Measures: blood products administered  VTE Prevention/Management:   sequential compression devices on   bilateral     Problem: Skin Injury Risk Increased  Goal: Skin Health and Integrity  Intervention: Optimize Skin Protection  Recent Flowsheet Documentation  Taken 1/9/2023 0410 by Malou Velazquez RN  Pressure Reduction Techniques:   frequent weight shift encouraged   sit time limited to 2 hours   pressure points protected   heels elevated off bed  Pressure Reduction Devices: pressure-redistributing mattress utilized  Skin Protection:   adhesive use limited   tubing/devices free from skin contact  Taken 1/9/2023 0002 by Malou Velazquez  RN  Head of Bed (HOB) Positioning: HOB at 30 degrees  Taken 1/8/2023 2002 by Malou Velazquez RN  Pressure Reduction Techniques:   sit time limited to 2 hours   weight shift assistance provided   pressure points protected  Head of Bed (HOB) Positioning: HOB at 30 degrees  Pressure Reduction Devices: pressure-redistributing mattress utilized  Skin Protection:   adhesive use limited   tubing/devices free from skin contact   Goal Outcome Evaluation:

## 2023-01-09 NOTE — CASE MANAGEMENT/SOCIAL WORK
Continued Stay Note  [facility]     Patient Name: Catalina Moreno  MRN: [id number]  Today's Date: 1/9/2023    Admit Date: 1/4/2023    Plan: Accepted to [facility] vs home with [facility]. No precert required. CM to follow up with pt for decision.   Discharge Plan     Row Name 01/09/23 1448       Plan    Plan Accepted to [facility] vs home with [facility]. No precert required. CM to follow up with pt for decision.    Plan Comments Liaison verified pt accepted to [facility]. Informed pt and she would like to return home due to needing to attend to her dog. Attempted to call pt son with no answer, VM left for call back. Pt lives alone, SNF is encouraged. CM to follow up tomorrow for final decision.                    Expected Discharge Date and Time     Expected Discharge Date Expected Discharge Time    Lon 10, 2023             Alicia Foster RN

## 2023-01-09 NOTE — PLAN OF CARE
"Goal Outcome Evaluation:      Assessment: Catalina Moreno presents with functional mobility impairments which indicate the need for skilled intervention. Tolerating session today without incident. Will continue to follow and progress as tolerated.     Plan/Recommendations:   Moderate Intensity Therapy recommended post-acute care. This is recommended as therapy feels the patient would require 3-4 days per week and wouldn't tolerate \"3 hour daily\" rehab intensity. SNF would be the preferred choice. If the patient does not agree to SNF, arrange HH or OP depending on home bound status. If patient is medically complex, consider LTACH.. Pt requires no DME at discharge.     Pt desires Skilled Rehab placement at discharge. Pt cooperative; agreeable to therapeutic recommendations and plan of care.            "

## 2023-01-09 NOTE — CASE MANAGEMENT/SOCIAL WORK
Social Work Assessment  [facility]     Patient Name: Catalina Moreno  MRN: [id number]  Today's Date: 1/9/2023    Admit Date: 1/4/2023       Discharge Plan     Row Name 01/09/23 0588       Plan    Plan Comments LSW met with patient at bedside and discussed patient needing SNF according to PT/OT. Patient stated she cannot go to SNF due to her dog being in the garage alone. Patient stated she gave her dog a large pile of food, the dog has a water bowl that can refill itself and left the garage open enough for the dog to be able to let itself. Patient is agreeable to home health and stated she had HH services in the past.    Row Name 01/09/23 8892                  Met with patient in room wearing PPE: mask.    Maintained distance greater than six feet and spent less than 15 minutes in the room.    KATY Lerner, MSW    Phone: [phone]  Cell: [phone]  Fax: [phone]  [email or web address]

## 2023-01-10 ENCOUNTER — READMISSION MANAGEMENT (OUTPATIENT)
Dept: CALL CENTER | Facility: HOSPITAL | Age: 83
End: 2023-01-10
Payer: MEDICARE

## 2023-01-10 VITALS
WEIGHT: 175.49 LBS | TEMPERATURE: 98.8 F | HEART RATE: 93 BPM | SYSTOLIC BLOOD PRESSURE: 118 MMHG | OXYGEN SATURATION: 91 % | HEIGHT: 63 IN | DIASTOLIC BLOOD PRESSURE: 74 MMHG | BODY MASS INDEX: 31.09 KG/M2 | RESPIRATION RATE: 20 BRPM

## 2023-01-10 LAB
ALBUMIN SERPL-MCNC: 3.8 G/DL (ref 3.5–5.2)
ALBUMIN/GLOB SERPL: 1.5 G/DL
ALP SERPL-CCNC: 136 U/L (ref 39–117)
ALT SERPL W P-5'-P-CCNC: 17 U/L (ref 1–33)
ANION GAP SERPL CALCULATED.3IONS-SCNC: 9 MMOL/L (ref 5–15)
AST SERPL-CCNC: 27 U/L (ref 1–32)
BASOPHILS # BLD AUTO: 0.1 10*3/MM3 (ref 0–0.2)
BASOPHILS NFR BLD AUTO: 1.7 % (ref 0–1.5)
BILIRUB SERPL-MCNC: 0.5 MG/DL (ref 0–1.2)
BUN SERPL-MCNC: 21 MG/DL (ref 8–23)
BUN/CREAT SERPL: 16.8 (ref 7–25)
CALCIUM SPEC-SCNC: 8.6 MG/DL (ref 8.6–10.5)
CHLORIDE SERPL-SCNC: 103 MMOL/L (ref 98–107)
CO2 SERPL-SCNC: 29 MMOL/L (ref 22–29)
CREAT SERPL-MCNC: 1.25 MG/DL (ref 0.57–1)
DEPRECATED RDW RBC AUTO: 56 FL (ref 37–54)
EGFRCR SERPLBLD CKD-EPI 2021: 43.4 ML/MIN/1.73
EOSINOPHIL # BLD AUTO: 0.2 10*3/MM3 (ref 0–0.4)
EOSINOPHIL NFR BLD AUTO: 4.8 % (ref 0.3–6.2)
ERYTHROCYTE [DISTWIDTH] IN BLOOD BY AUTOMATED COUNT: 18.3 % (ref 12.3–15.4)
GLOBULIN UR ELPH-MCNC: 2.6 GM/DL
GLUCOSE SERPL-MCNC: 132 MG/DL (ref 65–99)
HCT VFR BLD AUTO: 31.2 % (ref 34–46.6)
HGB BLD-MCNC: 9.5 G/DL (ref 12–15.9)
LYMPHOCYTES # BLD AUTO: 1.5 10*3/MM3 (ref 0.7–3.1)
LYMPHOCYTES NFR BLD AUTO: 33.8 % (ref 19.6–45.3)
MAGNESIUM SERPL-MCNC: 2.1 MG/DL (ref 1.6–2.4)
MCH RBC QN AUTO: 26.3 PG (ref 26.6–33)
MCHC RBC AUTO-ENTMCNC: 30.5 G/DL (ref 31.5–35.7)
MCV RBC AUTO: 86.4 FL (ref 79–97)
MONOCYTES # BLD AUTO: 0.4 10*3/MM3 (ref 0.1–0.9)
MONOCYTES NFR BLD AUTO: 9.9 % (ref 5–12)
NEUTROPHILS NFR BLD AUTO: 2.2 10*3/MM3 (ref 1.7–7)
NEUTROPHILS NFR BLD AUTO: 49.8 % (ref 42.7–76)
NRBC BLD AUTO-RTO: 0 /100 WBC (ref 0–0.2)
PHOSPHATE SERPL-MCNC: 4.4 MG/DL (ref 2.5–4.5)
PLATELET # BLD AUTO: 171 10*3/MM3 (ref 140–450)
PMV BLD AUTO: 9.2 FL (ref 6–12)
POTASSIUM SERPL-SCNC: 4.8 MMOL/L (ref 3.5–5.2)
PROT SERPL-MCNC: 6.4 G/DL (ref 6–8.5)
RBC # BLD AUTO: 3.61 10*6/MM3 (ref 3.77–5.28)
SODIUM SERPL-SCNC: 141 MMOL/L (ref 136–145)
WBC NRBC COR # BLD: 4.5 10*3/MM3 (ref 3.4–10.8)

## 2023-01-10 PROCEDURE — 83735 ASSAY OF MAGNESIUM: CPT | Performed by: NURSE PRACTITIONER

## 2023-01-10 PROCEDURE — 80053 COMPREHEN METABOLIC PANEL: CPT | Performed by: INTERNAL MEDICINE

## 2023-01-10 PROCEDURE — 94799 UNLISTED PULMONARY SVC/PX: CPT

## 2023-01-10 PROCEDURE — 84100 ASSAY OF PHOSPHORUS: CPT | Performed by: INTERNAL MEDICINE

## 2023-01-10 PROCEDURE — 97535 SELF CARE MNGMENT TRAINING: CPT | Performed by: OCCUPATIONAL THERAPIST

## 2023-01-10 PROCEDURE — 94618 PULMONARY STRESS TESTING: CPT

## 2023-01-10 PROCEDURE — 36415 COLL VENOUS BLD VENIPUNCTURE: CPT | Performed by: INTERNAL MEDICINE

## 2023-01-10 PROCEDURE — 85025 COMPLETE CBC W/AUTO DIFF WBC: CPT | Performed by: INTERNAL MEDICINE

## 2023-01-10 RX ORDER — METOPROLOL SUCCINATE 50 MG/1
50 TABLET, EXTENDED RELEASE ORAL ONCE
Status: COMPLETED | OUTPATIENT
Start: 2023-01-10 | End: 2023-01-10

## 2023-01-10 RX ORDER — METOPROLOL SUCCINATE 50 MG/1
100 TABLET, EXTENDED RELEASE ORAL
Status: DISCONTINUED | OUTPATIENT
Start: 2023-01-11 | End: 2023-01-10 | Stop reason: HOSPADM

## 2023-01-10 RX ORDER — METOPROLOL SUCCINATE 100 MG/1
100 TABLET, EXTENDED RELEASE ORAL
Qty: 30 TABLET | Refills: 2 | Status: SHIPPED | OUTPATIENT
Start: 2023-01-11 | End: 2023-01-20 | Stop reason: HOSPADM

## 2023-01-10 RX ADMIN — PANTOPRAZOLE SODIUM 40 MG: 40 TABLET, DELAYED RELEASE ORAL at 11:09

## 2023-01-10 RX ADMIN — METOCLOPRAMIDE 5 MG: 5 TABLET ORAL at 08:44

## 2023-01-10 RX ADMIN — METOPROLOL SUCCINATE 50 MG: 50 TABLET, EXTENDED RELEASE ORAL at 13:54

## 2023-01-10 RX ADMIN — METOCLOPRAMIDE 5 MG: 5 TABLET ORAL at 11:09

## 2023-01-10 RX ADMIN — METOPROLOL SUCCINATE 50 MG: 50 TABLET, EXTENDED RELEASE ORAL at 08:44

## 2023-01-10 RX ADMIN — SILDENAFIL CITRATE 20 MG: 20 TABLET ORAL at 05:08

## 2023-01-10 RX ADMIN — POLYETHYLENE GLYCOL 3350 17 G: 17 POWDER, FOR SOLUTION ORAL at 08:43

## 2023-01-10 RX ADMIN — HYDROCODONE BITARTRATE AND ACETAMINOPHEN 1 TABLET: 7.5; 325 TABLET ORAL at 02:04

## 2023-01-10 RX ADMIN — FAMOTIDINE 20 MG: 20 TABLET, FILM COATED ORAL at 08:45

## 2023-01-10 RX ADMIN — LEVOTHYROXINE SODIUM 88 MCG: 0.09 TABLET ORAL at 05:08

## 2023-01-10 RX ADMIN — HYDROCODONE BITARTRATE AND ACETAMINOPHEN 1 TABLET: 7.5; 325 TABLET ORAL at 08:44

## 2023-01-10 RX ADMIN — DIGOXIN 125 MCG: 125 TABLET ORAL at 11:08

## 2023-01-10 RX ADMIN — APIXABAN 5 MG: 5 TABLET, FILM COATED ORAL at 08:44

## 2023-01-10 RX ADMIN — CYANOCOBALAMIN TAB 1000 MCG 1000 MCG: 1000 TAB at 08:44

## 2023-01-10 RX ADMIN — Medication 10 ML: at 08:47

## 2023-01-10 RX ADMIN — GABAPENTIN 600 MG: 300 CAPSULE ORAL at 11:09

## 2023-01-10 RX ADMIN — CYCLOBENZAPRINE 5 MG: 10 TABLET, FILM COATED ORAL at 08:44

## 2023-01-10 RX ADMIN — GABAPENTIN 600 MG: 300 CAPSULE ORAL at 08:44

## 2023-01-10 NOTE — THERAPY TREATMENT NOTE
"Subjective: Pt agreeable to therapeutic plan of care. Pt reporting she is concerned about her dog at home. Pt stating she has no support at home.      Cognition: oriented to Person, Place, Time and Situation    Objective:     Bed Mobility: Independent   Functional Transfers: SBA and with rolling walker  Functional Ambulation: N/A or Not attempted.    Lower Body Dressing: SBA  ADL Position: edge of bed sitting and supported standing  ADL Comments:     Vitals: Tachycardic.  HR fluctuating vastly from 90s to 130's during tx. Nursing is aware of status.   Pain: 0 VAS  Education: Verbal/Tactile Cues for safety awareness.      Assessment: Catalina Moreno presents with ADL impairments below baseline abilities which indicate the need for continued skilled intervention while inpatient. Pt expressing frustration, anger & sadness today re: sons who are not supportive of pt. & upset about her dog being home alone. Pt lives alone & will need to take care of all BADLS & IADLs including driving, shopping, etc. & states she has nobody to assist her. She demo improved cognition & orientation & less s/s of impulsivity, but requires v.c. for pacing at times. She has progressed with her bathing, dressing & toileting skills, but continues to have decreased generalized activity tolerance. Additionally, her HR ranged from 90-upper 130s during tx. The nurse is aware of her status. Will continue to follow and progress as tolerated.     Plan/Recommendations:   Moderate Intensity Therapy recommended post-acute care. This is recommended as therapy feels the patient would require 3-4 days per week and wouldn't tolerate \"3 hour daily\" rehab intensity. SNF would be the preferred choice. If the patient does not agree to SNF, arrange HH or OP depending on home bound status. If patient is medically complex, consider LTACH.. Pt requires no DME at discharge.     Pt desires Home with Home Health at discharge. Pt cooperative; agreeable to therapeutic " Name: Belén Pollard   MRN: [id number]  : 1942      Assessment:  Acute on chronic severe hyponatremia: etiology appears to be combination from back pain causing excessive ADH effects + presumed poor solute intake (reflected by low BUN) + recent NSAIDs use (got a dose of Toradol on ) and was also on Daypro     Hypothyroidism- last TSH was elevated in 2019  DM-2  AMS  Recent back surgery/Lumbar laminectomy     Na has slowly improved overnight. Got IV Saline initially, which may have worsened hyponatremia. Got 3% saline bolus x 2 (100 ml each). Brief seizure- aborted quickly. Na now upto 121. S/p IV Lasix overnight by me. Repeat TSH is low (not high). Mentation is improved, although still confused. Urine studies from yesterday supportive of extra ADH activity    Plan/Recommendations:  Ct IV NS + KCL at low rate. S/p IV lasix early AM. Still diuresing  Repeat BMP at noon. Expect Na to slowly improve. Adjust Rx based on f/u Na  No need for PICC line at present, as I suspect, we wont need 3% saline anymore  Will give Tolvaptan if needed  Control Pain; avoid nsaids  Fluid restriction 1.2 L/day  Encourage increase solute intake    Subjective:  More alert awake. Mentation better. Still confused though  No seizures. One episode    ROS:   As above. Rest deferred    Exam:  Visit Vitals  /71 (BP 1 Location: Right arm, BP Patient Position: At rest;Supine)   Pulse 84   Temp 97.9 °F (36.6 °C)   Resp 18   Ht 5' 4\" (1.626 m)   Wt 74.3 kg (163 lb 12.8 oz)   SpO2 94%   BMI 28.12 kg/m²       Elderly thin built in NAD  No icterus, mmm  No JVD  Clear  RRR, no murmur  No edema  Alert awake. Improved mentation.  Not fully oriented  No twitching or myoclonus    Current Facility-Administered Medications   Medication Dose Route Frequency Last Dose    0.9% sodium chloride infusion 250 mL  250 mL IntraVENous PRN      0.9% sodium chloride with KCl 40 mEq/L infusion   IntraVENous CONTINUOUS      sodium chloride (NS) flush 5-40 mL  5-40 mL IntraVENous Q8H 10 mL at 05/24/19 0602    sodium chloride (NS) flush 5-40 mL  5-40 mL IntraVENous PRN      acetaminophen (TYLENOL) tablet 650 mg  650 mg Oral Q6H PRN      naloxone (NARCAN) injection 0.4 mg  0.4 mg IntraVENous PRN      ondansetron (ZOFRAN) injection 4 mg  4 mg IntraVENous Q4H PRN      bisacodyl (DULCOLAX) suppository 10 mg  10 mg Rectal DAILY PRN      enoxaparin (LOVENOX) injection 40 mg  40 mg SubCUTAneous Q24H 40 mg at 05/24/19 0953    cyanocobalamin (VITAMIN B12) tablet 250 mcg  250 mcg Oral DAILY 250 mcg at 05/24/19 0952    [START ON 5/25/2019] levothyroxine (SYNTHROID) tablet 50 mcg  50 mcg Oral Once per day on Sun Tue Wed Thu Sat      levothyroxine (SYNTHROID) tablet 75 mcg  75 mcg Oral Once per day on Mon Fri 75 mcg at 05/24/19 0556    atorvastatin (LIPITOR) tablet 40 mg  40 mg Oral QHS 40 mg at 05/23/19 2152    aspirin chewable tablet 81 mg  81 mg Oral DAILY 81 mg at 05/24/19 0952    3% NaCl- call physician if serum sodium increases by more than 8-12 mmol/L in 24 hours  1 Each Other DAILY Stopped at 05/24/19 0900    mupirocin (BACTROBAN) 2 % ointment   Both Nostrils Q12H         Labs/Data:    Lab Results   Component Value Date/Time    WBC 13.6 (H) 05/24/2019 01:50 AM    HGB 13.6 05/24/2019 01:50 AM    HCT 39.4 05/24/2019 01:50 AM    PLATELET 317 14/19/7612 01:50 AM    MCV 91.4 05/24/2019 01:50 AM       Lab Results   Component Value Date/Time    Sodium 121 (L) 05/24/2019 08:11 AM    Potassium 3.6 05/24/2019 01:50 AM    Chloride 84 (L) 05/24/2019 01:50 AM    CO2 26 05/24/2019 01:50 AM    Anion gap 8 05/24/2019 01:50 AM    Glucose 120 (H) 05/24/2019 01:50 AM    BUN 8 05/24/2019 01:50 AM    Creatinine 0.61 05/24/2019 01:50 AM    BUN/Creatinine ratio 13 05/24/2019 01:50 AM    GFR est AA >60 05/24/2019 01:50 AM    GFR est non-AA >60 recommendations and plan of care.     Modified Derek: N/A = No pre-op stroke/TIA    Post-Tx Position: Supine with HOB Elevated, Staff Present and Call light and personal items within reach  PPE: gloves and surgical mask   05/24/2019 01:50 AM    Calcium 8.1 (L) 05/24/2019 01:50 AM       Wt Readings from Last 3 Encounters:   05/23/19 74.3 kg (163 lb 12.8 oz)   05/22/19 74.4 kg (164 lb)   05/16/19 78.9 kg (174 lb)         Intake/Output Summary (Last 24 hours) at 5/24/2019 1058  Last data filed at 5/24/2019 1000  Gross per 24 hour   Intake 1291.67 ml   Output 3075 ml   Net -1783.33 ml       Patient seen and examined. Chart reviewed. Labs, data and other pertinent notes reviewed in last 24 hrs.     PMH/SH/FH reviewed and unchanged compared to H&P    Discussed with pt/RN and PICC team      Alejandra Cunningham MD

## 2023-01-10 NOTE — CASE MANAGEMENT/SOCIAL WORK
Continued Stay Note  [facility]     Patient Name: Catalina Moreno  MRN: [id number]  Today's Date: 1/10/2023    Admit Date: 1/4/2023    Plan: Pt.declines subacute rehab. Wants to return home to dog. Pt.states neighbor checking in on dog.  Home with [facility] Pt. will pay for transport home.    Expected Discharge Date and Time     Expected Discharge Date Expected Discharge Time    Lon 10, 2023             Suzie Foreman RN

## 2023-01-10 NOTE — DISCHARGE SUMMARY
[facility] Medicine Services  DISCHARGE SUMMARY    Patient Name: Catalina Moreno  : 1940  MRN: [id number]    Discharge condition: Stable  Date of Admission: 2023  Discharge Diagnosis: A. fib with RVR  Date of Discharge: 01/10/2023  Primary Care Physician: Rubio Dover MD      Presenting Problem:   Generalized abdominal pain [R10.84]  Atrial fibrillation with rapid ventricular response (HCC) [I48.91]    Active and Resolved Hospital Problems:  Active Hospital Problems    Diagnosis POA   • **Atrial fibrillation with rapid ventricular response (HCC) [I48.91] Yes   • Chronic diastolic CHF (congestive heart failure) (HCC) [I50.32] Yes   • Generalized anxiety disorder [F41.1] Yes   • Restless legs syndrome [G25.81] Yes   • Stage 2 chronic kidney disease [N18.2] Yes   • Pulmonary hypertension (HCC) [I27.20] Yes   • Chronic low back pain [M54.50, G89.29] Yes   • Depressive disorder [F32.A] Yes   • 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         Hospital Course     Hospital Course:  Catalina Moreno is a 82 y.o. female who presented to the hospital with nausea and was found to be in A. fib with RVR.  She was started on Cardizem drip and admitted to the medicine service.  Cardiology was consulted.  The patient has been on Eliquis but ran out.  She does not have any signs of stroke.  Patient's heart rate slowly improved and she was converted to oral medications.  She remained very weak and recommended to go to therapy but refused because she has a dog at that is by itself at home.  The patient eventually got stronger and her heart rate was reasonably controlled.  Her metoprolol was increased.  She was given refills for all her prescriptions and sent home in stable condition with stable  vitals on January 10.            Reasons For Change In Medications and Indications for New Medications:      Day of Discharge     Vital Signs:  Temp:  [97.5 °F (36.4 °C)-98.5 °F (36.9 °C)] 98.5 °F (36.9 °C)  Heart Rate:  [] 101  Resp:  [13-28] 24  BP: (108-145)/(49-78) 144/77  Flow (L/min):  [2] 2    Physical Exam:  Physical Exam  Vitals reviewed.   HENT:      Head: Normocephalic.      Nose: Nose normal.      Mouth/Throat:      Mouth: Mucous membranes are moist.   Cardiovascular:      Rate and Rhythm: Normal rate and regular rhythm.      Pulses: Normal pulses.      Heart sounds: Normal heart sounds.   Pulmonary:      Effort: Pulmonary effort is normal.      Breath sounds: Normal breath sounds.   Abdominal:      General: Abdomen is flat.      Palpations: Abdomen is soft.   Musculoskeletal:         General: Normal range of motion.      Cervical back: Normal range of motion.   Skin:     General: Skin is warm.   Neurological:      General: No focal deficit present.      Mental Status: She is alert and oriented to person, place, and time.   Psychiatric:         Mood and Affect: Mood normal.         Behavior: Behavior normal.            Pertinent  and/or Most Recent Results     LAB RESULTS:      Lab 01/10/23  0339 01/09/23  0307 01/08/23  0348 01/07/23  0443 01/06/23  0340 01/05/23  0343 01/04/23  1826 01/04/23  1825   WBC 4.50 3.80 4.30 6.40 5.10   < > 5.70  --    HEMOGLOBIN 9.5* 8.7* 8.8* 9.4* 9.8*   < > 10.5*  --    HEMATOCRIT 31.2* 28.2* 27.8* 29.1* 31.0*   < > 33.7*  --    PLATELETS 171 155 154 165 163   < > 201  --    NEUTROS ABS 2.20 1.80 2.30 5.00 2.10   < > 2.80  --    LYMPHS ABS 1.50 1.40 1.40 0.90 2.20   < > 2.30  --    MONOS ABS 0.40 0.40 0.40 0.30 0.40   < > 0.40  --    EOS ABS 0.20 0.20 0.20 0.10 0.30   < > 0.10  --    MCV 86.4 85.4 84.6 84.4 84.7   < > 85.7  --    LACTATE  --   --   --   --   --   --   --  1.4   PROTIME  --   --   --   --   --   --  13.0*  --    APTT  --   --   --   --   --   --   32.2*  --     < > = values in this interval not displayed.         Lab 01/10/23  0339 01/09/23  1628 01/09/23  0307 01/08/23 0348 01/07/23 0443 01/06/23 0340 01/05/23 0343 01/04/23  1826   SODIUM 141  --  141 138 135* 141 140 140   POTASSIUM 4.8  --  4.7 4.6 4.7 4.6 3.9 4.0   CHLORIDE 103  --  106 104 100 108* 107 105   CO2 29.0  --  29.0 27.0 26.0 23.0 22.0 20.0*   ANION GAP 9.0  --  6.0 7.0 9.0 10.0 11.0 15.0   BUN 21  --  17 20 22 20 20 18   CREATININE 1.25*  --  1.24* 0.99 0.91 1.01* 1.11* 1.19*   EGFR 43.4*  --  43.8* 57.4* 63.5 56.0* 50.0* 46.0*   GLUCOSE 132*  --  104* 93 117* 93 124* 104*   CALCIUM 8.6  --  8.0* 8.3* 8.7 8.4* 8.4* 8.7   IONIZED CALCIUM  --  1.13*  --   --   --   --   --   --    MAGNESIUM 2.1  --  2.0 2.2 2.1 2.1 1.9 1.9   PHOSPHORUS 4.4  --  3.8 3.3 2.7 3.2  --   --    HEMOGLOBIN A1C  --   --   --   --   --   --  5.9*  --    TSH  --   --   --   --   --   --   --  5.660*         Lab 01/10/23  0339 01/09/23 0307 01/08/23 0348 01/07/23 0443 01/06/23 0340   TOTAL PROTEIN 6.4 5.6* 5.8* 6.5 6.2   ALBUMIN 3.8 3.1* 3.6 3.8 3.7   GLOBULIN 2.6 2.5 2.2 2.7 2.5   ALT (SGPT) 17 17 16 17 15   AST (SGOT) 27 28 25 28 25   BILIRUBIN 0.5 0.5 0.7 0.8 1.1   ALK PHOS 136* 113 87 82 76         Lab 01/05/23  0343 01/04/23  1826   PROBNP  --  9,074.0*   TROPONIN T <0.010 <0.010   PROTIME  --  13.0*   INR  --  1.28*         Lab 01/04/23  1826   CHOLESTEROL 103   LDL CHOL 53   HDL CHOL 36*   TRIGLYCERIDES 63             Brief Urine Lab Results  (Last result in the past 365 days)      Color   Clarity   Blood   Leuk Est   Nitrite   Protein   CREAT   Urine HCG        08/30/22 2143 Dark Yellow   Clear   Negative   Negative   Negative   30 mg/dL (1+)               Microbiology Results (last 10 days)     ** No results found for the last 240 hours. **          CT Abdomen Pelvis Without Contrast    Result Date: 1/6/2023  Impression: 1.No evidence for significant nephrolithiasis. There is no evidence for obstructive  uropathy. 2.No evidence for acute abnormality throughout the abdomen or pelvis on this noncontrast exam. 3.Atelectasis versus infiltrates are noted in the lung bases. Electronically Signed: Russel Sanderson  1/6/2023 9:38 PM EST  Workstation ID: [id number]    XR Chest 1 View    Result Date: 1/4/2023  Impression: Impression: 1. Stable cardiomegaly. 2. Elevated right hemidiaphragm. 3. No acute process in the chest Electronically Signed: Mark Steward  1/4/2023 7:13 PM EST  Workstation ID: [id number]    XR Abdomen KUB    Result Date: 1/6/2023  Impression: Impression: 1. No significant retained colonic stool burden is identified. Nonobstructive appearing bowel gas pattern. Electronically Signed: Suzanne King  1/6/2023 2:53 PM EST  Workstation ID: [id number]      Results for orders placed during the hospital encounter of 03/04/22    Duplex Venous Lower Extremity - Bilateral CAR    Interpretation Summary  · Normal bilateral lower extremity venous duplex scan.  · Nonvascular cystic mass located in the left posterior mid calf.      Results for orders placed during the hospital encounter of 03/04/22    Duplex Venous Lower Extremity - Bilateral CAR    Interpretation Summary  · Normal bilateral lower extremity venous duplex scan.  · Nonvascular cystic mass located in the left posterior mid calf.      Results for orders placed during the hospital encounter of 01/04/23    Adult Transthoracic Echo Complete With Contrast if Necessary Per Protocol    Interpretation Summary  •  Left ventricular ejection fraction appears to be 36 - 40%.  •  There is calcification of the aortic valve.  •  Moderate mitral valve regurgitation is present.  •  Estimated right ventricular systolic pressure from tricuspid regurgitation is normal (<35 mmHg).  •  No pericardial effusion noted      Labs Pending at Discharge:      Procedures Performed           Consults:   Consults     Date and Time Order Name Status Description    1/4/2023  8:25 PM Inpatient  Cardiology Consult      1/4/2023  7:30 PM Hospitalist (on-call MD unless specified)              Discharge Details        Discharge Medications      Changes to Medications      Instructions Start Date   digoxin 125 MCG tablet  Commonly known as: LANOXIN  What changed: additional instructions   125 mcg, Oral, Daily Digoxin      metoprolol succinate  MG 24 hr tablet  Commonly known as: TOPROL-XL  What changed:   · medication strength  · See the new instructions.   100 mg, Oral, Every 24 Hours Scheduled   Start Date: January 11, 2023        Continue These Medications      Instructions Start Date   atorvastatin 10 MG tablet  Commonly known as: LIPITOR   10 mg, Oral, Nightly      cyanocobalamin 1000 MCG tablet  Commonly known as: VITAMIN B-12   1,000 mcg, Oral, Daily      Eliquis 5 MG tablet tablet  Generic drug: apixaban   5 mg, Oral, Every 12 Hours Scheduled      famotidine 20 MG tablet  Commonly known as: PEPCID   20 mg, Oral, Daily      gabapentin 600 MG tablet  Commonly known as: NEURONTIN   TAKE 1 TABLET BY MOUTH FOUR TIMES DAILY      HYDROcodone-acetaminophen 7.5-325 MG per tablet  Commonly known as: NORCO   1 tablet, Oral, 4 Times Daily PRN, DNF before 11/2/2022      levothyroxine 88 MCG tablet  Commonly known as: SYNTHROID, LEVOTHROID   88 mcg, Oral, Daily      lidocaine 5 %  Commonly known as: LIDODERM   UNWRAP AND APPLY 2 PATCH(ES) ON THE SKIN DAILY AS DIRECTED BY PROVIDER. REMOVE AND DISCARD PATCH(ES) WITHIN 12 HOURS OR AS DIRECTED BY MD      loperamide 2 MG tablet  Commonly known as: IMODIUM A-D   2 mg, Oral, 4 Times Daily PRN      pantoprazole 40 MG EC tablet  Commonly known as: PROTONIX   40 mg, Oral, Daily With Lunch, Afternoons      rOPINIRole 0.25 MG tablet  Commonly known as: REQUIP   0.25 mg, Oral, Nightly, Take 1 hour before bedtime      sertraline 50 MG tablet  Commonly known as: ZOLOFT   50 mg, Oral, Nightly      sildenafil 20 MG tablet  Commonly known as: REVATIO   Indications: Pulmonary  Arterial Hypertension      tiZANidine 4 MG tablet  Commonly known as: ZANAFLEX   4 mg, Oral, 2 Times Daily PRN         Stop These Medications    dilTIAZem  MG 24 hr capsule  Commonly known as: CARDIZEM CD            Allergies   Allergen Reactions   • Baclofen Rash   • Codeine Nausea Only   • Ibuprofen Unknown (See Comments)     Patient doesn't know----Motin   • Methocarbamol Unknown (See Comments)     Patient doesn't know   • Naproxen Unknown (See Comments)     Pt. Doesn't know    • Tizanidine Hcl Other (See Comments)     Syncope    • Tolmetin Dizziness     Pt. Doesn't know-- same as Tolectin         Discharge Disposition:   Home or Self Care    Diet:  Hospital:  Diet Order   Procedures   • Diet: Regular/House Diet; Texture: Regular Texture (IDDSI 7); Fluid Consistency: Thin (IDDSI 0)         Discharge Activity:         CODE STATUS:  Code Status and Medical Interventions:   Ordered at: 01/04/23 2025     Code Status (Patient has no pulse and is not breathing):    CPR (Attempt to Resuscitate)     Medical Interventions (Patient has pulse or is breathing):    Full Support         No future appointments.    Additional Instructions for the Follow-ups that You Need to Schedule     Ambulatory Referral to Home Health (Hospital)   As directed      Face to Face Visit Date: 1/9/2023    Follow-up provider for Plan of Care?: I treated the patient in an acute care facility and will not continue treatment after discharge.    Follow-up provider: OKSANA FLORES [[id number]]    Reason/Clinical Findings: a fib copd weakness    Describe mobility limitations that make leaving home difficult: a fib copd weakness    Nursing/Therapeutic Services Requested: Physical Therapy Occupational Therapy    PT orders: Electrical stimulation    Occupational orders: Strengthening    Frequency: 1 Week 1               Time spent on Discharge including face to face service:  66 minutes    This patient has been examined wearing appropriate  Personal Protective Equipment and discussed with Patient. 01/10/23      Signature: Dom Murray MD

## 2023-01-10 NOTE — CASE MANAGEMENT/SOCIAL WORK
Social Work Assessment  [facility]     Patient Name: Catalina Moreno  MRN: [id number]  Today's Date: 1/10/2023    Admit Date: 1/4/2023     Discharge Needs Assessment    No documentation.                Discharge Plan     Row Name 01/10/23 1416       Plan    Plan D/C plan: Home with Home w/ [facility]. Transportation set up through [employer]. LSW met with clinical team & updated of transportation through [employer] and updated with ETA.    Plan Comments LSW met with patient at bedside. Discussed patient having d/c orders and patient reported she has money to pay for transportation home but needs transportation set up. LSW ordered [employer] ride for patient and confirmed ride will be within her budget. LSW informed patients clinical team on the floor (RN and CM RN) that [employer] was ordered and also updated team with estimated time of arrival.                      Met with patient in room wearing PPE: mask    Maintained distance greater than six feet and spent less than 15 minutes in the room.    KATY Lerenr, MSW    Phone: [phone]  Cell: [phone]  Fax: [phone]  [email or web address]

## 2023-01-10 NOTE — PROGRESS NOTES
Exercise Oximetry    Patient Name:Catalina Moreno   MRN: [id number]   Date: 01/10/23             ROOM AIR BASELINE   SpO2% 93%   Heart Rate    Blood Pressure      EXERCISE ON ROOM AIR SpO2% EXERCISE ON O2 @ n/a LPM SpO2%   1 MINUTE 91% 1 MINUTE    2 MINUTES 91% 2 MINUTES    3 MINUTES 91% 3 MINUTES    4 MINUTES 91% 4 MINUTES    5 MINUTES 91% 5 MINUTES    6 MINUTES 91% 6 MINUTES               Distance Walked  Around patient's room (back and forth from door to window several times) Distance Walked   Dyspnea (Marilyn Scale)  n/a Dyspnea (Marilyn Scale)   Fatigue (Marilyn Scale)  n/a Fatigue (Marilyn Scale)   SpO2% Post Exercise  96% SpO2% Post Exercise   HR Post Exercise  98 HR Post Exercise   Time to Recovery  n/a Time to Recovery     Comments: Patient said she does not currently wear O2 @home. Patient's room air baseline was 93% while laying in bed. Patient used walker and walked back and forth from door to window in patient's room several times. Sats stayed between 91-97% throughout walk. Patient said she did not feel short of breath after walking oximetry.

## 2023-01-10 NOTE — SIGNIFICANT NOTE
01/10/23 1311   OTHER   Discipline physical therapist   Rehab Time/Intention   Session Not Performed other (see comments)  (hospital d/c pending; pt refusing SNF and going home to take care of her dog.)   Recommendation   PT - Next Appointment 01/11/23

## 2023-01-10 NOTE — PLAN OF CARE
"Goal Outcome Evaluation:  Assessment: Catalina Moreno presents with ADL impairments below baseline abilities which indicate the need for continued skilled intervention while inpatient. Pt expressing frustration, anger & sadness today re: sons who are not supportive of pt. & upset about her dog being home alone. Pt lives alone & will need to take care of all BADLS & IADLs including driving, shopping, etc. & states she has nobody to assist her. She demo improved cognition & orientation & less s/s of impulsivity, but requires v.c. for pacing at times. She has progressed with her bathing, dressing & toileting skills, but continues to have decreased generalized activity tolerance. Additionally, her HR ranged from 90-upper 130s during tx. The nurse is aware of her status. Will continue to follow and progress as tolerated.     Plan/Recommendations:   Moderate Intensity Therapy recommended post-acute care. This is recommended as therapy feels the patient would require 3-4 days per week and wouldn't tolerate \"3 hour daily\" rehab intensity. SNF would be the preferred choice. If the patient does not agree to SNF, arrange HH or OP depending on home bound status. If patient is medically complex, consider LTACH.. Pt requires no DME at discharge.           "

## 2023-01-10 NOTE — PLAN OF CARE
Problem: Adult Inpatient Plan of Care  Goal: Plan of Care Review  Outcome: Ongoing, Progressing  Goal: Patient-Specific Goal (Individualized)  Outcome: Ongoing, Progressing  Goal: Absence of Hospital-Acquired Illness or Injury  Outcome: Ongoing, Progressing  Intervention: Identify and Manage Fall Risk  Recent Flowsheet Documentation  Taken 1/10/2023 0200 by Gini Lang RN  Safety Promotion/Fall Prevention:   safety round/check completed   fall prevention program maintained   activity supervised   assistive device/personal items within reach   clutter free environment maintained   nonskid shoes/slippers when out of bed   room organization consistent  Taken 1/10/2023 0000 by Gini Lang RN  Safety Promotion/Fall Prevention:   safety round/check completed   fall prevention program maintained   activity supervised   assistive device/personal items within reach   clutter free environment maintained   nonskid shoes/slippers when out of bed   room organization consistent  Taken 1/9/2023 2200 by Gini Lang RN  Safety Promotion/Fall Prevention:   safety round/check completed   fall prevention program maintained  Taken 1/9/2023 2028 by Gini Lang RN  Safety Promotion/Fall Prevention:   safety round/check completed   fall prevention program maintained   activity supervised   assistive device/personal items within reach   clutter free environment maintained   nonskid shoes/slippers when out of bed   room organization consistent  Intervention: Prevent and Manage VTE (Venous Thromboembolism) Risk  Recent Flowsheet Documentation  Taken 1/9/2023 2028 by Gini Lang RN  VTE Prevention/Management:   bilateral   sequential compression devices off   patient refused intervention  Intervention: Prevent Infection  Recent Flowsheet Documentation  Taken 1/10/2023 0000 by Gini Lang RN  Infection Prevention: environmental surveillance performed  Taken 1/9/2023 2028 by Gini Lang RN  Infection  Prevention:   environmental surveillance performed   hand hygiene promoted  Goal: Optimal Comfort and Wellbeing  Outcome: Ongoing, Progressing  Intervention: Provide Person-Centered Care  Recent Flowsheet Documentation  Taken 1/9/2023 2028 by Gini Lang RN  Trust Relationship/Rapport:   emotional support provided   choices provided   care explained  Goal: Readiness for Transition of Care  Outcome: Ongoing, Progressing     Problem: Fall Injury Risk  Goal: Absence of Fall and Fall-Related Injury  Outcome: Ongoing, Progressing  Intervention: Identify and Manage Contributors  Recent Flowsheet Documentation  Taken 1/9/2023 2028 by Gini Lang RN  Medication Review/Management: medications reviewed  Intervention: Promote Injury-Free Environment  Recent Flowsheet Documentation  Taken 1/10/2023 0200 by Gini Lang RN  Safety Promotion/Fall Prevention:   safety round/check completed   fall prevention program maintained   activity supervised   assistive device/personal items within reach   clutter free environment maintained   nonskid shoes/slippers when out of bed   room organization consistent  Taken 1/10/2023 0000 by Gini Lang RN  Safety Promotion/Fall Prevention:   safety round/check completed   fall prevention program maintained   activity supervised   assistive device/personal items within reach   clutter free environment maintained   nonskid shoes/slippers when out of bed   room organization consistent  Taken 1/9/2023 2200 by Gini Lang RN  Safety Promotion/Fall Prevention:   safety round/check completed   fall prevention program maintained  Taken 1/9/2023 2028 by Gini Lang RN  Safety Promotion/Fall Prevention:   safety round/check completed   fall prevention program maintained   activity supervised   assistive device/personal items within reach   clutter free environment maintained   nonskid shoes/slippers when out of bed   room organization consistent     Problem: Pain  Acute  Goal: Acceptable Pain Control and Functional Ability  Outcome: Ongoing, Progressing  Intervention: Prevent or Manage Pain  Recent Flowsheet Documentation  Taken 1/9/2023 2028 by Gini Lang RN  Medication Review/Management: medications reviewed  Intervention: Optimize Psychosocial Wellbeing  Recent Flowsheet Documentation  Taken 1/9/2023 2028 by Gini Lang RN  Diversional Activities: television     Problem: Dysrhythmia  Goal: Normalized Cardiac Rhythm  Outcome: Ongoing, Progressing  Intervention: Monitor and Manage Cardiac Rhythm Effect  Recent Flowsheet Documentation  Taken 1/9/2023 2028 by Gini Lang RN  VTE Prevention/Management:   bilateral   sequential compression devices off   patient refused intervention     Problem: Skin Injury Risk Increased  Goal: Skin Health and Integrity  Outcome: Ongoing, Progressing   Goal Outcome Evaluation:

## 2023-01-11 LAB — QT INTERVAL: 425 MS

## 2023-01-11 NOTE — CASE MANAGEMENT/SOCIAL WORK
Case Management Discharge Note      Final Note: Home with [facility]         Selected Continued Care - Discharged on 1/10/2023 Admission date: 1/4/2023 - Discharge disposition: Home or Self Care         Transportation Services  Taxi:  ([employer])    Final Discharge Disposition Code: 06 - home with home health care

## 2023-01-11 NOTE — OUTREACH NOTE
Prep Survey    Flowsheet Row Responses   [facility] facility patient discharged from? [facility]   Is LACE score < 7 ? No   Eligibility Readm Mgmt   Discharge diagnosis Chronic diastolic CHF    Does the patient have one of the following disease processes/diagnoses(primary or secondary)? CHF   Does the patient have Home health ordered? No   Is there a DME ordered? Yes   What DME was ordered?  [facility]   Prep survey completed? Yes          LARRY SCHWAB - Registered Nurse

## 2023-01-12 ENCOUNTER — READMISSION MANAGEMENT (OUTPATIENT)
Dept: CALL CENTER | Facility: HOSPITAL | Age: 83
End: 2023-01-12
Payer: MEDICARE

## 2023-01-12 NOTE — OUTREACH NOTE
CHF Week 1 Survey    Flowsheet Row Responses   [facility] [place] by [facility] patient discharged from? [facility]   Does the patient have one of the following disease processes/diagnoses(primary or secondary)? CHF   CHF Week 1 attempt successful? No   Unsuccessful attempts Attempt 1   General alerts for this patient No calls to son/Karri unless emergency.          HANS JUNG - Registered Nurse

## 2023-01-14 ENCOUNTER — APPOINTMENT (OUTPATIENT)
Dept: GENERAL RADIOLOGY | Facility: HOSPITAL | Age: 83
DRG: 243 | End: 2023-01-14
Payer: MEDICARE

## 2023-01-14 ENCOUNTER — HOSPITAL ENCOUNTER (INPATIENT)
Facility: HOSPITAL | Age: 83
LOS: 6 days | Discharge: NURSING, REHAB OR LONG-TERM CARE | DRG: 243 | End: 2023-01-20
Attending: EMERGENCY MEDICINE | Admitting: HOSPITALIST
Payer: MEDICARE

## 2023-01-14 DIAGNOSIS — I50.22 CHRONIC SYSTOLIC CHF (CONGESTIVE HEART FAILURE): ICD-10-CM

## 2023-01-14 DIAGNOSIS — R53.1 WEAKNESS: ICD-10-CM

## 2023-01-14 DIAGNOSIS — I48.91 ATRIAL FIBRILLATION WITH RVR: Primary | ICD-10-CM

## 2023-01-14 DIAGNOSIS — I49.5 SICK SINUS SYNDROME: ICD-10-CM

## 2023-01-14 DIAGNOSIS — E66.09 CLASS 1 OBESITY DUE TO EXCESS CALORIES WITH SERIOUS COMORBIDITY AND BODY MASS INDEX (BMI) OF 30.0 TO 30.9 IN ADULT: ICD-10-CM

## 2023-01-14 PROBLEM — R11.10 VOMITING, UNSPECIFIED: Status: RESOLVED | Noted: 2021-07-27 | Resolved: 2023-01-14

## 2023-01-14 PROBLEM — I27.20 PULMONARY HYPERTENSION (HCC): Chronic | Status: RESOLVED | Noted: 2020-08-06 | Resolved: 2023-01-14

## 2023-01-14 LAB
ALBUMIN SERPL-MCNC: 4.2 G/DL (ref 3.5–5.2)
ALBUMIN/GLOB SERPL: 1.3 G/DL
ALP SERPL-CCNC: 147 U/L (ref 39–117)
ALT SERPL W P-5'-P-CCNC: 13 U/L (ref 1–33)
ANION GAP SERPL CALCULATED.3IONS-SCNC: 12 MMOL/L (ref 5–15)
AST SERPL-CCNC: 17 U/L (ref 1–32)
B PARAPERT DNA SPEC QL NAA+PROBE: NOT DETECTED
B PERT DNA SPEC QL NAA+PROBE: NOT DETECTED
BACTERIA UR QL AUTO: ABNORMAL /HPF
BASOPHILS # BLD AUTO: 0.1 10*3/MM3 (ref 0–0.2)
BASOPHILS NFR BLD AUTO: 1.9 % (ref 0–1.5)
BILIRUB SERPL-MCNC: 1.8 MG/DL (ref 0–1.2)
BILIRUB UR QL STRIP: NEGATIVE
BUN SERPL-MCNC: 10 MG/DL (ref 8–23)
BUN/CREAT SERPL: 11.6 (ref 7–25)
C PNEUM DNA NPH QL NAA+NON-PROBE: NOT DETECTED
CALCIUM SPEC-SCNC: 9.6 MG/DL (ref 8.6–10.5)
CHLORIDE SERPL-SCNC: 102 MMOL/L (ref 98–107)
CK SERPL-CCNC: 50 U/L (ref 20–180)
CLARITY UR: CLEAR
CO2 SERPL-SCNC: 26 MMOL/L (ref 22–29)
COLOR UR: YELLOW
CREAT SERPL-MCNC: 0.86 MG/DL (ref 0.57–1)
DEPRECATED RDW RBC AUTO: 51.2 FL (ref 37–54)
DIGOXIN SERPL-MCNC: 0.4 NG/ML (ref 0.6–1.2)
EGFRCR SERPLBLD CKD-EPI 2021: 68 ML/MIN/1.73
EOSINOPHIL # BLD AUTO: 0.1 10*3/MM3 (ref 0–0.4)
EOSINOPHIL NFR BLD AUTO: 1.6 % (ref 0.3–6.2)
ERYTHROCYTE [DISTWIDTH] IN BLOOD BY AUTOMATED COUNT: 17.7 % (ref 12.3–15.4)
FLUAV SUBTYP SPEC NAA+PROBE: NOT DETECTED
FLUBV RNA ISLT QL NAA+PROBE: NOT DETECTED
GLOBULIN UR ELPH-MCNC: 3.2 GM/DL
GLUCOSE SERPL-MCNC: 107 MG/DL (ref 65–99)
GLUCOSE UR STRIP-MCNC: NEGATIVE MG/DL
HADV DNA SPEC NAA+PROBE: NOT DETECTED
HCOV 229E RNA SPEC QL NAA+PROBE: NOT DETECTED
HCOV HKU1 RNA SPEC QL NAA+PROBE: NOT DETECTED
HCOV NL63 RNA SPEC QL NAA+PROBE: NOT DETECTED
HCOV OC43 RNA SPEC QL NAA+PROBE: NOT DETECTED
HCT VFR BLD AUTO: 36.3 % (ref 34–46.6)
HGB BLD-MCNC: 11.2 G/DL (ref 12–15.9)
HGB UR QL STRIP.AUTO: NEGATIVE
HMPV RNA NPH QL NAA+NON-PROBE: NOT DETECTED
HOLD SPECIMEN: NORMAL
HPIV1 RNA ISLT QL NAA+PROBE: NOT DETECTED
HPIV2 RNA SPEC QL NAA+PROBE: NOT DETECTED
HPIV3 RNA NPH QL NAA+PROBE: NOT DETECTED
HPIV4 P GENE NPH QL NAA+PROBE: NOT DETECTED
HYALINE CASTS UR QL AUTO: ABNORMAL /LPF
KETONES UR QL STRIP: NEGATIVE
LEUKOCYTE ESTERASE UR QL STRIP.AUTO: NEGATIVE
LYMPHOCYTES # BLD AUTO: 1.6 10*3/MM3 (ref 0.7–3.1)
LYMPHOCYTES NFR BLD AUTO: 23 % (ref 19.6–45.3)
M PNEUMO IGG SER IA-ACNC: NOT DETECTED
MAGNESIUM SERPL-MCNC: 1.9 MG/DL (ref 1.6–2.4)
MCH RBC QN AUTO: 25.8 PG (ref 26.6–33)
MCHC RBC AUTO-ENTMCNC: 30.8 G/DL (ref 31.5–35.7)
MCV RBC AUTO: 83.7 FL (ref 79–97)
MONOCYTES # BLD AUTO: 0.5 10*3/MM3 (ref 0.1–0.9)
MONOCYTES NFR BLD AUTO: 7.7 % (ref 5–12)
NEUTROPHILS NFR BLD AUTO: 4.5 10*3/MM3 (ref 1.7–7)
NEUTROPHILS NFR BLD AUTO: 65.8 % (ref 42.7–76)
NITRITE UR QL STRIP: NEGATIVE
NRBC BLD AUTO-RTO: 0 /100 WBC (ref 0–0.2)
NT-PROBNP SERPL-MCNC: 9487 PG/ML (ref 0–1800)
PH UR STRIP.AUTO: 7.5 [PH] (ref 5–8)
PLATELET # BLD AUTO: 233 10*3/MM3 (ref 140–450)
PMV BLD AUTO: 8.3 FL (ref 6–12)
POTASSIUM SERPL-SCNC: 4 MMOL/L (ref 3.5–5.2)
PROT SERPL-MCNC: 7.4 G/DL (ref 6–8.5)
PROT UR QL STRIP: ABNORMAL
RBC # BLD AUTO: 4.34 10*6/MM3 (ref 3.77–5.28)
RBC # UR STRIP: ABNORMAL /HPF
REF LAB TEST METHOD: ABNORMAL
RHINOVIRUS RNA SPEC NAA+PROBE: NOT DETECTED
RSV RNA NPH QL NAA+NON-PROBE: NOT DETECTED
SARS-COV-2 RNA NPH QL NAA+NON-PROBE: NOT DETECTED
SODIUM SERPL-SCNC: 140 MMOL/L (ref 136–145)
SP GR UR STRIP: 1.01 (ref 1–1.03)
SQUAMOUS #/AREA URNS HPF: ABNORMAL /HPF
TROPONIN T SERPL-MCNC: <0.01 NG/ML (ref 0–0.03)
UROBILINOGEN UR QL STRIP: ABNORMAL
WBC # UR STRIP: ABNORMAL /HPF
WBC NRBC COR # BLD: 6.9 10*3/MM3 (ref 3.4–10.8)
WHOLE BLOOD HOLD COAG: NORMAL
WHOLE BLOOD HOLD SPECIMEN: NORMAL

## 2023-01-14 PROCEDURE — 84484 ASSAY OF TROPONIN QUANT: CPT | Performed by: EMERGENCY MEDICINE

## 2023-01-14 PROCEDURE — 83735 ASSAY OF MAGNESIUM: CPT | Performed by: EMERGENCY MEDICINE

## 2023-01-14 PROCEDURE — 80162 ASSAY OF DIGOXIN TOTAL: CPT | Performed by: EMERGENCY MEDICINE

## 2023-01-14 PROCEDURE — 93005 ELECTROCARDIOGRAM TRACING: CPT | Performed by: EMERGENCY MEDICINE

## 2023-01-14 PROCEDURE — P9612 CATHETERIZE FOR URINE SPEC: HCPCS

## 2023-01-14 PROCEDURE — 0202U NFCT DS 22 TRGT SARS-COV-2: CPT | Performed by: EMERGENCY MEDICINE

## 2023-01-14 PROCEDURE — 71045 X-RAY EXAM CHEST 1 VIEW: CPT

## 2023-01-14 PROCEDURE — 99285 EMERGENCY DEPT VISIT HI MDM: CPT

## 2023-01-14 PROCEDURE — 85025 COMPLETE CBC W/AUTO DIFF WBC: CPT | Performed by: EMERGENCY MEDICINE

## 2023-01-14 PROCEDURE — 81001 URINALYSIS AUTO W/SCOPE: CPT | Performed by: EMERGENCY MEDICINE

## 2023-01-14 PROCEDURE — 83880 ASSAY OF NATRIURETIC PEPTIDE: CPT | Performed by: EMERGENCY MEDICINE

## 2023-01-14 PROCEDURE — 82550 ASSAY OF CK (CPK): CPT | Performed by: EMERGENCY MEDICINE

## 2023-01-14 PROCEDURE — 80053 COMPREHEN METABOLIC PANEL: CPT | Performed by: EMERGENCY MEDICINE

## 2023-01-14 PROCEDURE — 25010000002 DIGOXIN PER 500 MCG: Performed by: EMERGENCY MEDICINE

## 2023-01-14 RX ORDER — POLYETHYLENE GLYCOL 3350 17 G/17G
17 POWDER, FOR SOLUTION ORAL DAILY PRN
Status: DISCONTINUED | OUTPATIENT
Start: 2023-01-14 | End: 2023-01-20 | Stop reason: HOSPADM

## 2023-01-14 RX ORDER — ACETAMINOPHEN 325 MG/1
650 TABLET ORAL EVERY 4 HOURS PRN
Status: DISCONTINUED | OUTPATIENT
Start: 2023-01-14 | End: 2023-01-20 | Stop reason: HOSPADM

## 2023-01-14 RX ORDER — SODIUM CHLORIDE 9 MG/ML
40 INJECTION, SOLUTION INTRAVENOUS AS NEEDED
Status: DISCONTINUED | OUTPATIENT
Start: 2023-01-14 | End: 2023-01-20 | Stop reason: HOSPADM

## 2023-01-14 RX ORDER — SODIUM CHLORIDE 0.9 % (FLUSH) 0.9 %
10 SYRINGE (ML) INJECTION EVERY 12 HOURS SCHEDULED
Status: DISCONTINUED | OUTPATIENT
Start: 2023-01-14 | End: 2023-01-20 | Stop reason: HOSPADM

## 2023-01-14 RX ORDER — DIGOXIN 0.25 MG/ML
125 INJECTION INTRAMUSCULAR; INTRAVENOUS ONCE
Status: COMPLETED | OUTPATIENT
Start: 2023-01-14 | End: 2023-01-14

## 2023-01-14 RX ORDER — BISACODYL 10 MG
10 SUPPOSITORY, RECTAL RECTAL DAILY PRN
Status: DISCONTINUED | OUTPATIENT
Start: 2023-01-14 | End: 2023-01-20 | Stop reason: HOSPADM

## 2023-01-14 RX ORDER — ACETAMINOPHEN 650 MG/1
650 SUPPOSITORY RECTAL EVERY 4 HOURS PRN
Status: DISCONTINUED | OUTPATIENT
Start: 2023-01-14 | End: 2023-01-20 | Stop reason: HOSPADM

## 2023-01-14 RX ORDER — HYDROCODONE BITARTRATE AND ACETAMINOPHEN 7.5; 325 MG/1; MG/1
1 TABLET ORAL ONCE
Status: COMPLETED | OUTPATIENT
Start: 2023-01-14 | End: 2023-01-14

## 2023-01-14 RX ORDER — AMOXICILLIN 250 MG
2 CAPSULE ORAL 2 TIMES DAILY
Status: DISCONTINUED | OUTPATIENT
Start: 2023-01-14 | End: 2023-01-20 | Stop reason: HOSPADM

## 2023-01-14 RX ORDER — ONDANSETRON 2 MG/ML
4 INJECTION INTRAMUSCULAR; INTRAVENOUS EVERY 6 HOURS PRN
Status: DISCONTINUED | OUTPATIENT
Start: 2023-01-14 | End: 2023-01-15

## 2023-01-14 RX ORDER — DILTIAZEM HYDROCHLORIDE 5 MG/ML
10 INJECTION INTRAVENOUS ONCE
Status: DISCONTINUED | OUTPATIENT
Start: 2023-01-14 | End: 2023-01-15

## 2023-01-14 RX ORDER — SODIUM CHLORIDE 0.9 % (FLUSH) 0.9 %
10 SYRINGE (ML) INJECTION AS NEEDED
Status: DISCONTINUED | OUTPATIENT
Start: 2023-01-14 | End: 2023-01-20 | Stop reason: HOSPADM

## 2023-01-14 RX ORDER — NITROGLYCERIN 0.4 MG/1
0.4 TABLET SUBLINGUAL
Status: DISCONTINUED | OUTPATIENT
Start: 2023-01-14 | End: 2023-01-15

## 2023-01-14 RX ORDER — BENZONATATE 100 MG/1
100 CAPSULE ORAL ONCE
Status: COMPLETED | OUTPATIENT
Start: 2023-01-14 | End: 2023-01-14

## 2023-01-14 RX ORDER — DILTIAZEM HCL/D5W 125 MG/125
5-15 PLASTIC BAG, INJECTION (ML) INTRAVENOUS
Status: DISCONTINUED | OUTPATIENT
Start: 2023-01-14 | End: 2023-01-14

## 2023-01-14 RX ORDER — ACETAMINOPHEN 160 MG/5ML
650 SOLUTION ORAL EVERY 4 HOURS PRN
Status: DISCONTINUED | OUTPATIENT
Start: 2023-01-14 | End: 2023-01-20 | Stop reason: HOSPADM

## 2023-01-14 RX ORDER — CHOLECALCIFEROL (VITAMIN D3) 125 MCG
5 CAPSULE ORAL NIGHTLY PRN
Status: DISCONTINUED | OUTPATIENT
Start: 2023-01-14 | End: 2023-01-20 | Stop reason: HOSPADM

## 2023-01-14 RX ORDER — BISACODYL 5 MG/1
5 TABLET, DELAYED RELEASE ORAL DAILY PRN
Status: DISCONTINUED | OUTPATIENT
Start: 2023-01-14 | End: 2023-01-20 | Stop reason: HOSPADM

## 2023-01-14 RX ORDER — ONDANSETRON 4 MG/1
4 TABLET, FILM COATED ORAL EVERY 6 HOURS PRN
Status: DISCONTINUED | OUTPATIENT
Start: 2023-01-14 | End: 2023-01-15

## 2023-01-14 RX ORDER — GUAIFENESIN 200 MG/10ML
200 LIQUID ORAL EVERY 4 HOURS PRN
Status: DISCONTINUED | OUTPATIENT
Start: 2023-01-14 | End: 2023-01-20 | Stop reason: HOSPADM

## 2023-01-14 RX ORDER — DILTIAZEM HCL/D5W 125 MG/125
5-15 PLASTIC BAG, INJECTION (ML) INTRAVENOUS
Status: DISCONTINUED | OUTPATIENT
Start: 2023-01-14 | End: 2023-01-15

## 2023-01-14 RX ORDER — ROPINIROLE 0.25 MG/1
0.25 TABLET, FILM COATED ORAL NIGHTLY
Status: DISCONTINUED | OUTPATIENT
Start: 2023-01-15 | End: 2023-01-20 | Stop reason: HOSPADM

## 2023-01-14 RX ORDER — DILTIAZEM HYDROCHLORIDE 5 MG/ML
20 INJECTION INTRAVENOUS ONCE
Status: COMPLETED | OUTPATIENT
Start: 2023-01-14 | End: 2023-01-14

## 2023-01-14 RX ADMIN — DILTIAZEM HYDROCHLORIDE 20 MG: 5 INJECTION, SOLUTION INTRAVENOUS at 20:20

## 2023-01-14 RX ADMIN — DIGOXIN 125 MCG: 0.25 INJECTION INTRAMUSCULAR; INTRAVENOUS at 21:20

## 2023-01-14 RX ADMIN — HYDROCODONE BITARTRATE AND ACETAMINOPHEN 1 TABLET: 7.5; 325 TABLET ORAL at 21:26

## 2023-01-14 RX ADMIN — Medication 5 MG/HR: at 20:20

## 2023-01-14 RX ADMIN — DILTIAZEM HYDROCHLORIDE 20 MG: 5 INJECTION, SOLUTION INTRAVENOUS at 21:27

## 2023-01-14 RX ADMIN — BENZONATATE 100 MG: 100 CAPSULE ORAL at 21:20

## 2023-01-14 NOTE — Clinical Note
Prepped: left chest. Prepped with: ChloraPrep. The site was clipped. The patient was draped in a sterile fashion.

## 2023-01-14 NOTE — Clinical Note
Level of Care: Telemetry [5]   Admitting Physician: SANDRA EWING [[id number]]   Attending Physician: SANDRA EWING [[id number]]   Bed Request Comments: pcu

## 2023-01-15 ENCOUNTER — READMISSION MANAGEMENT (OUTPATIENT)
Dept: CALL CENTER | Facility: HOSPITAL | Age: 83
End: 2023-01-15
Payer: MEDICARE

## 2023-01-15 PROBLEM — G62.9 POLYNEUROPATHY, UNSPECIFIED: Chronic | Status: ACTIVE | Noted: 2021-07-27

## 2023-01-15 PROBLEM — I50.33 ACUTE ON CHRONIC DIASTOLIC CONGESTIVE HEART FAILURE: Status: RESOLVED | Noted: 2020-01-23 | Resolved: 2023-01-15

## 2023-01-15 PROBLEM — R41.82 ALTERED MENTAL STATUS: Status: RESOLVED | Noted: 2020-01-04 | Resolved: 2023-01-15

## 2023-01-15 PROBLEM — I27.20 PULMONARY HYPERTENSION, UNSPECIFIED: Chronic | Status: ACTIVE | Noted: 2021-07-27

## 2023-01-15 PROBLEM — I48.91 ATRIAL FIBRILLATION WITH RVR (HCC): Status: ACTIVE | Noted: 2023-01-14

## 2023-01-15 PROBLEM — I95.9 HYPOTENSION: Status: ACTIVE | Noted: 2023-01-15

## 2023-01-15 PROBLEM — I50.33 ACUTE ON CHRONIC DIASTOLIC (CONGESTIVE) HEART FAILURE (HCC): Status: RESOLVED | Noted: 2021-07-27 | Resolved: 2023-01-15

## 2023-01-15 PROBLEM — E66.09 CLASS 1 OBESITY DUE TO EXCESS CALORIES WITH SERIOUS COMORBIDITY AND BODY MASS INDEX (BMI) OF 30.0 TO 30.9 IN ADULT: Status: ACTIVE | Noted: 2023-01-15

## 2023-01-15 PROBLEM — I25.10 CORONARY ARTERY DISEASE INVOLVING NATIVE CORONARY ARTERY OF NATIVE HEART WITHOUT ANGINA PECTORIS: Chronic | Status: ACTIVE | Noted: 2021-04-02

## 2023-01-15 PROBLEM — I34.0 MODERATE MITRAL REGURGITATION: Status: ACTIVE | Noted: 2023-01-05

## 2023-01-15 PROBLEM — N17.9 ACUTE KIDNEY INJURY: Status: RESOLVED | Noted: 2022-07-22 | Resolved: 2023-01-15

## 2023-01-15 PROBLEM — I50.22 CHRONIC SYSTOLIC CHF (CONGESTIVE HEART FAILURE): Chronic | Status: ACTIVE | Noted: 2022-03-22

## 2023-01-15 PROBLEM — R10.9 UNSPECIFIED ABDOMINAL PAIN: Status: RESOLVED | Noted: 2021-07-27 | Resolved: 2023-01-15

## 2023-01-15 PROBLEM — R53.1 WEAKNESS: Status: RESOLVED | Noted: 2021-07-27 | Resolved: 2023-01-15

## 2023-01-15 PROBLEM — E66.811 CLASS 1 OBESITY DUE TO EXCESS CALORIES WITH SERIOUS COMORBIDITY AND BODY MASS INDEX (BMI) OF 30.0 TO 30.9 IN ADULT: Status: ACTIVE | Noted: 2023-01-15

## 2023-01-15 LAB
ANION GAP SERPL CALCULATED.3IONS-SCNC: 11 MMOL/L (ref 5–15)
ANION GAP SERPL CALCULATED.3IONS-SCNC: 9 MMOL/L (ref 5–15)
BASOPHILS # BLD AUTO: 0.1 10*3/MM3 (ref 0–0.2)
BASOPHILS NFR BLD AUTO: 1.4 % (ref 0–1.5)
BUN SERPL-MCNC: 17 MG/DL (ref 8–23)
BUN SERPL-MCNC: 18 MG/DL (ref 8–23)
BUN/CREAT SERPL: 18.7 (ref 7–25)
BUN/CREAT SERPL: 21.2 (ref 7–25)
CALCIUM SPEC-SCNC: 8.7 MG/DL (ref 8.6–10.5)
CALCIUM SPEC-SCNC: 8.7 MG/DL (ref 8.6–10.5)
CHLORIDE SERPL-SCNC: 101 MMOL/L (ref 98–107)
CHLORIDE SERPL-SCNC: 102 MMOL/L (ref 98–107)
CO2 SERPL-SCNC: 26 MMOL/L (ref 22–29)
CO2 SERPL-SCNC: 27 MMOL/L (ref 22–29)
CREAT SERPL-MCNC: 0.85 MG/DL (ref 0.57–1)
CREAT SERPL-MCNC: 0.91 MG/DL (ref 0.57–1)
DEPRECATED RDW RBC AUTO: 55.6 FL (ref 37–54)
EGFRCR SERPLBLD CKD-EPI 2021: 63.5 ML/MIN/1.73
EGFRCR SERPLBLD CKD-EPI 2021: 68.9 ML/MIN/1.73
EOSINOPHIL # BLD AUTO: 0.2 10*3/MM3 (ref 0–0.4)
EOSINOPHIL NFR BLD AUTO: 4.4 % (ref 0.3–6.2)
ERYTHROCYTE [DISTWIDTH] IN BLOOD BY AUTOMATED COUNT: 18 % (ref 12.3–15.4)
GLUCOSE SERPL-MCNC: 131 MG/DL (ref 65–99)
GLUCOSE SERPL-MCNC: 146 MG/DL (ref 65–99)
HCT VFR BLD AUTO: 30.3 % (ref 34–46.6)
HCT VFR BLD AUTO: 33.8 % (ref 34–46.6)
HGB BLD-MCNC: 10.4 G/DL (ref 12–15.9)
HGB BLD-MCNC: 9.7 G/DL (ref 12–15.9)
LYMPHOCYTES # BLD AUTO: 1.5 10*3/MM3 (ref 0.7–3.1)
LYMPHOCYTES NFR BLD AUTO: 34.2 % (ref 19.6–45.3)
MAGNESIUM SERPL-MCNC: 1.9 MG/DL (ref 1.6–2.4)
MCH RBC QN AUTO: 26.5 PG (ref 26.6–33)
MCHC RBC AUTO-ENTMCNC: 32 G/DL (ref 31.5–35.7)
MCV RBC AUTO: 82.9 FL (ref 79–97)
MONOCYTES # BLD AUTO: 0.5 10*3/MM3 (ref 0.1–0.9)
MONOCYTES NFR BLD AUTO: 11.1 % (ref 5–12)
NEUTROPHILS NFR BLD AUTO: 2.1 10*3/MM3 (ref 1.7–7)
NEUTROPHILS NFR BLD AUTO: 48.9 % (ref 42.7–76)
NRBC BLD AUTO-RTO: 0.1 /100 WBC (ref 0–0.2)
PLATELET # BLD AUTO: 198 10*3/MM3 (ref 140–450)
PMV BLD AUTO: 8.6 FL (ref 6–12)
POTASSIUM SERPL-SCNC: 3.7 MMOL/L (ref 3.5–5.2)
POTASSIUM SERPL-SCNC: 3.9 MMOL/L (ref 3.5–5.2)
QT INTERVAL: 409 MS
QT INTERVAL: 468 MS
RBC # BLD AUTO: 3.65 10*6/MM3 (ref 3.77–5.28)
SODIUM SERPL-SCNC: 138 MMOL/L (ref 136–145)
SODIUM SERPL-SCNC: 138 MMOL/L (ref 136–145)
TROPONIN T SERPL-MCNC: <0.01 NG/ML (ref 0–0.03)
WBC NRBC COR # BLD: 4.3 10*3/MM3 (ref 3.4–10.8)
WHOLE BLOOD HOLD SPECIMEN: NORMAL

## 2023-01-15 PROCEDURE — 85014 HEMATOCRIT: CPT | Performed by: NURSE PRACTITIONER

## 2023-01-15 PROCEDURE — 85018 HEMOGLOBIN: CPT | Performed by: NURSE PRACTITIONER

## 2023-01-15 PROCEDURE — 80048 BASIC METABOLIC PNL TOTAL CA: CPT

## 2023-01-15 PROCEDURE — 63710000001 ONDANSETRON PER 8 MG

## 2023-01-15 PROCEDURE — 93005 ELECTROCARDIOGRAM TRACING: CPT | Performed by: INTERNAL MEDICINE

## 2023-01-15 PROCEDURE — 36415 COLL VENOUS BLD VENIPUNCTURE: CPT

## 2023-01-15 PROCEDURE — 80048 BASIC METABOLIC PNL TOTAL CA: CPT | Performed by: INTERNAL MEDICINE

## 2023-01-15 PROCEDURE — 84484 ASSAY OF TROPONIN QUANT: CPT | Performed by: NURSE PRACTITIONER

## 2023-01-15 PROCEDURE — 85025 COMPLETE CBC W/AUTO DIFF WBC: CPT

## 2023-01-15 PROCEDURE — 83735 ASSAY OF MAGNESIUM: CPT | Performed by: INTERNAL MEDICINE

## 2023-01-15 PROCEDURE — 99222 1ST HOSP IP/OBS MODERATE 55: CPT | Performed by: INTERNAL MEDICINE

## 2023-01-15 PROCEDURE — 0 MAGNESIUM SULFATE 4 GM/100ML SOLUTION: Performed by: INTERNAL MEDICINE

## 2023-01-15 RX ORDER — PANTOPRAZOLE SODIUM 40 MG/1
40 TABLET, DELAYED RELEASE ORAL
Status: DISCONTINUED | OUTPATIENT
Start: 2023-01-15 | End: 2023-01-20 | Stop reason: HOSPADM

## 2023-01-15 RX ORDER — MAGNESIUM SULFATE HEPTAHYDRATE 40 MG/ML
2 INJECTION, SOLUTION INTRAVENOUS AS NEEDED
Status: DISCONTINUED | OUTPATIENT
Start: 2023-01-15 | End: 2023-01-20 | Stop reason: HOSPADM

## 2023-01-15 RX ORDER — HYDROCODONE BITARTRATE AND ACETAMINOPHEN 7.5; 325 MG/1; MG/1
1 TABLET ORAL 4 TIMES DAILY PRN
Status: DISCONTINUED | OUTPATIENT
Start: 2023-01-15 | End: 2023-01-20 | Stop reason: HOSPADM

## 2023-01-15 RX ORDER — POTASSIUM CHLORIDE 20 MEQ/1
40 TABLET, EXTENDED RELEASE ORAL AS NEEDED
Status: DISCONTINUED | OUTPATIENT
Start: 2023-01-15 | End: 2023-01-20 | Stop reason: HOSPADM

## 2023-01-15 RX ORDER — TIZANIDINE 4 MG/1
4 TABLET ORAL EVERY 12 HOURS PRN
Status: DISCONTINUED | OUTPATIENT
Start: 2023-01-15 | End: 2023-01-20 | Stop reason: HOSPADM

## 2023-01-15 RX ORDER — LIDOCAINE 50 MG/G
2 PATCH TOPICAL
Status: DISCONTINUED | OUTPATIENT
Start: 2023-01-15 | End: 2023-01-20 | Stop reason: HOSPADM

## 2023-01-15 RX ORDER — SILDENAFIL CITRATE 20 MG/1
20 TABLET ORAL EVERY 8 HOURS SCHEDULED
Status: DISCONTINUED | OUTPATIENT
Start: 2023-01-15 | End: 2023-01-20 | Stop reason: HOSPADM

## 2023-01-15 RX ORDER — POTASSIUM CHLORIDE 1.5 G/1.77G
40 POWDER, FOR SOLUTION ORAL AS NEEDED
Status: DISCONTINUED | OUTPATIENT
Start: 2023-01-15 | End: 2023-01-20 | Stop reason: HOSPADM

## 2023-01-15 RX ORDER — METOPROLOL SUCCINATE 50 MG/1
100 TABLET, EXTENDED RELEASE ORAL
Status: DISCONTINUED | OUTPATIENT
Start: 2023-01-15 | End: 2023-01-16

## 2023-01-15 RX ORDER — ATORVASTATIN CALCIUM 10 MG/1
10 TABLET, FILM COATED ORAL NIGHTLY
Status: DISCONTINUED | OUTPATIENT
Start: 2023-01-15 | End: 2023-01-20 | Stop reason: HOSPADM

## 2023-01-15 RX ORDER — GABAPENTIN 600 MG/1
600 TABLET ORAL 4 TIMES DAILY
Status: DISCONTINUED | OUTPATIENT
Start: 2023-01-15 | End: 2023-01-20 | Stop reason: HOSPADM

## 2023-01-15 RX ORDER — LEVOTHYROXINE SODIUM 88 UG/1
88 TABLET ORAL
Status: DISCONTINUED | OUTPATIENT
Start: 2023-01-15 | End: 2023-01-20 | Stop reason: HOSPADM

## 2023-01-15 RX ORDER — SODIUM CHLORIDE 9 MG/ML
125 INJECTION, SOLUTION INTRAVENOUS CONTINUOUS
Status: DISCONTINUED | OUTPATIENT
Start: 2023-01-15 | End: 2023-01-15

## 2023-01-15 RX ORDER — MAGNESIUM SULFATE HEPTAHYDRATE 40 MG/ML
4 INJECTION, SOLUTION INTRAVENOUS AS NEEDED
Status: DISCONTINUED | OUTPATIENT
Start: 2023-01-15 | End: 2023-01-20 | Stop reason: HOSPADM

## 2023-01-15 RX ORDER — DOFETILIDE 0.25 MG/1
500 CAPSULE ORAL EVERY 12 HOURS SCHEDULED
Status: DISCONTINUED | OUTPATIENT
Start: 2023-01-15 | End: 2023-01-16

## 2023-01-15 RX ORDER — DIGOXIN 125 MCG
125 TABLET ORAL
Status: DISCONTINUED | OUTPATIENT
Start: 2023-01-15 | End: 2023-01-15

## 2023-01-15 RX ORDER — SODIUM CHLORIDE 9 MG/ML
50 INJECTION, SOLUTION INTRAVENOUS CONTINUOUS
Status: DISPENSED | OUTPATIENT
Start: 2023-01-15 | End: 2023-01-16

## 2023-01-15 RX ADMIN — ATORVASTATIN CALCIUM 10 MG: 20 TABLET, FILM COATED ORAL at 01:48

## 2023-01-15 RX ADMIN — SILDENAFIL CITRATE 20 MG: 20 TABLET ORAL at 21:09

## 2023-01-15 RX ADMIN — Medication 10 ML: at 09:25

## 2023-01-15 RX ADMIN — GABAPENTIN 600 MG: 600 TABLET, FILM COATED ORAL at 21:09

## 2023-01-15 RX ADMIN — GABAPENTIN 600 MG: 600 TABLET, FILM COATED ORAL at 09:25

## 2023-01-15 RX ADMIN — METOPROLOL SUCCINATE 100 MG: 25 TABLET, FILM COATED, EXTENDED RELEASE ORAL at 10:30

## 2023-01-15 RX ADMIN — SENNOSIDES AND DOCUSATE SODIUM 2 TABLET: 50; 8.6 TABLET ORAL at 21:09

## 2023-01-15 RX ADMIN — SODIUM CHLORIDE 40 ML: 9 INJECTION, SOLUTION INTRAVENOUS at 03:02

## 2023-01-15 RX ADMIN — APIXABAN 5 MG: 5 TABLET, FILM COATED ORAL at 21:08

## 2023-01-15 RX ADMIN — ROPINIROLE HYDROCHLORIDE 0.25 MG: 0.25 TABLET, FILM COATED ORAL at 01:39

## 2023-01-15 RX ADMIN — SERTRALINE 50 MG: 50 TABLET, FILM COATED ORAL at 21:12

## 2023-01-15 RX ADMIN — TIZANIDINE 4 MG: 4 TABLET ORAL at 01:39

## 2023-01-15 RX ADMIN — SODIUM CHLORIDE 50 ML/HR: 9 INJECTION, SOLUTION INTRAVENOUS at 14:10

## 2023-01-15 RX ADMIN — MAGNESIUM SULFATE HEPTAHYDRATE 4 G: 40 INJECTION, SOLUTION INTRAVENOUS at 16:08

## 2023-01-15 RX ADMIN — Medication 10 ML: at 01:48

## 2023-01-15 RX ADMIN — DIGOXIN 125 MCG: 125 TABLET ORAL at 12:06

## 2023-01-15 RX ADMIN — HYDROCODONE BITARTRATE AND ACETAMINOPHEN 1 TABLET: 7.5; 325 TABLET ORAL at 09:16

## 2023-01-15 RX ADMIN — SENNOSIDES AND DOCUSATE SODIUM 2 TABLET: 50; 8.6 TABLET ORAL at 09:17

## 2023-01-15 RX ADMIN — ROPINIROLE HYDROCHLORIDE 0.25 MG: 0.25 TABLET, FILM COATED ORAL at 21:12

## 2023-01-15 RX ADMIN — Medication 10 ML: at 21:09

## 2023-01-15 RX ADMIN — APIXABAN 5 MG: 5 TABLET, FILM COATED ORAL at 09:25

## 2023-01-15 RX ADMIN — HYDROCODONE BITARTRATE AND ACETAMINOPHEN 1 TABLET: 7.5; 325 TABLET ORAL at 21:15

## 2023-01-15 RX ADMIN — SENNOSIDES AND DOCUSATE SODIUM 2 TABLET: 50; 8.6 TABLET ORAL at 01:48

## 2023-01-15 RX ADMIN — APIXABAN 5 MG: 5 TABLET, FILM COATED ORAL at 01:48

## 2023-01-15 RX ADMIN — HYDROCODONE BITARTRATE AND ACETAMINOPHEN 1 TABLET: 7.5; 325 TABLET ORAL at 01:53

## 2023-01-15 RX ADMIN — GABAPENTIN 600 MG: 600 TABLET, FILM COATED ORAL at 18:24

## 2023-01-15 RX ADMIN — ONDANSETRON HYDROCHLORIDE 4 MG: 4 TABLET, FILM COATED ORAL at 12:06

## 2023-01-15 RX ADMIN — GABAPENTIN 600 MG: 600 TABLET, FILM COATED ORAL at 12:06

## 2023-01-15 RX ADMIN — SILDENAFIL CITRATE 20 MG: 20 TABLET ORAL at 14:11

## 2023-01-15 RX ADMIN — ATORVASTATIN CALCIUM 10 MG: 20 TABLET, FILM COATED ORAL at 21:12

## 2023-01-15 RX ADMIN — PANTOPRAZOLE SODIUM 40 MG: 40 TABLET, DELAYED RELEASE ORAL at 12:06

## 2023-01-15 RX ADMIN — LIDOCAINE 1 PATCH: 50 PATCH TOPICAL at 09:16

## 2023-01-15 RX ADMIN — LEVOTHYROXINE SODIUM 88 MCG: 0.09 TABLET ORAL at 05:59

## 2023-01-15 RX ADMIN — DOFETILIDE 500 MCG: 0.5 CAPSULE ORAL at 18:59

## 2023-01-15 RX ADMIN — HYDROCODONE BITARTRATE AND ACETAMINOPHEN 1 TABLET: 7.5; 325 TABLET ORAL at 16:14

## 2023-01-15 NOTE — PLAN OF CARE
Patient stable throughout shift. Patient to start Tikosyn at 1900 today. She has been in A-fib with HR 70-85 throughout shift.     Problem: Adult Inpatient Plan of Care  Goal: Absence of Hospital-Acquired Illness or Injury  Intervention: Identify and Manage Fall Risk  Recent Flowsheet Documentation  Taken 1/15/2023 1614 by Reema Wayne RN  Safety Promotion/Fall Prevention: safety round/check completed  Taken 1/15/2023 1400 by Reema Wayne RN  Safety Promotion/Fall Prevention: safety round/check completed  Taken 1/15/2023 1200 by Reema Wayne RN  Safety Promotion/Fall Prevention: safety round/check completed  Taken 1/15/2023 1000 by Reema Wayne RN  Safety Promotion/Fall Prevention: safety round/check completed  Taken 1/15/2023 0756 by Reema Wayne RN  Safety Promotion/Fall Prevention: safety round/check completed  Intervention: Prevent Skin Injury  Recent Flowsheet Documentation  Taken 1/15/2023 0756 by Reema Wayne RN  Body Position: position changed independently  Intervention: Prevent and Manage VTE (Venous Thromboembolism) Risk  Recent Flowsheet Documentation  Taken 1/15/2023 0756 by Reema Wayne RN  Activity Management: activity adjusted per tolerance  Goal: Optimal Comfort and Wellbeing  Intervention: Monitor Pain and Promote Comfort  Recent Flowsheet Documentation  Taken 1/15/2023 1614 by Reema Wayne RN  Pain Management Interventions: see MAR  Taken 1/15/2023 0916 by Reema Wayne RN  Pain Management Interventions: see MAR  Intervention: Provide Person-Centered Care  Recent Flowsheet Documentation  Taken 1/15/2023 0756 by Reema Wayne RN  Trust Relationship/Rapport:   care explained   thoughts/feelings acknowledged   reassurance provided     Problem: Osteoarthritis Comorbidity  Goal: Maintenance of Osteoarthritis Symptom Control  Intervention: Maintain Osteoarthritis Symptom Control  Recent Flowsheet Documentation  Taken 1/15/2023 0756 by Tone  Reema WHITTEN RN  Activity Management: activity adjusted per tolerance     Problem: Pain Chronic (Persistent) (Comorbidity Management)  Goal: Acceptable Pain Control and Functional Ability  Intervention: Develop Pain Management Plan  Recent Flowsheet Documentation  Taken 1/15/2023 1614 by Reema Wayne RN  Pain Management Interventions: see MAR  Taken 1/15/2023 0916 by Reema Wayne RN  Pain Management Interventions: see MAR     Problem: Fall Injury Risk  Goal: Absence of Fall and Fall-Related Injury  Outcome: Ongoing, Progressing  Intervention: Promote Injury-Free Environment  Recent Flowsheet Documentation  Taken 1/15/2023 1614 by Reema Wayne, RN  Safety Promotion/Fall Prevention: safety round/check completed  Taken 1/15/2023 1400 by Reema Wayne RN  Safety Promotion/Fall Prevention: safety round/check completed  Taken 1/15/2023 1200 by Reema Wayne RN  Safety Promotion/Fall Prevention: safety round/check completed  Taken 1/15/2023 1000 by Reema Wayne RN  Safety Promotion/Fall Prevention: safety round/check completed  Taken 1/15/2023 0756 by Reema Wayne RN  Safety Promotion/Fall Prevention: safety round/check completed   Goal Outcome Evaluation:

## 2023-01-15 NOTE — CASE MANAGEMENT/SOCIAL WORK
Discharge Planning Assessment   Gabriel     Patient Name: Catalina Moreno  MRN: [id number]  Today's Date: 1/15/2023    Admit Date: 1/14/2023    Plan: D/C Plan: Anticipate Routine home, pending clinical course and PT/OT eval. May need assist with dc transporation.   Discharge Needs Assessment     Row Name 01/15/23 1519       Living Environment    People in Home alone    Current Living Arrangements home    Primary Care Provided by self    Provides Primary Care For no one, unable/limited ability to care for self    Family Caregiver if Needed none    Quality of Family Relationships unable to assess    Able to Return to Prior Arrangements yes       Resource/Environmental Concerns    Resource/Environmental Concerns none       Transition Planning    Patient/Family Anticipates Transition to home    Patient/Family Anticipated Services at Transition none    Transportation Anticipated family or friend will provide       Discharge Needs Assessment    Equipment Currently Used at Home walker, standard;grab bar    Concerns to be Addressed discharge planning    Anticipated Changes Related to Illness inability to care for self    Equipment Needed After Discharge none    Outpatient/Agency/Support Group Needs homecare agency    Provided Post Acute Provider List? Yes    Post Acute Provider List Home Health    Provided Post Acute Provider Quality & Resource List? Yes    Post Acute Provider Quality and Resource List Home Health    Delivered To Patient    Method of Delivery In person               Discharge Plan     Row Name 01/15/23 1521       Plan    Plan D/C Plan: Anticipate Routine home, pending clinical course and PT/OT eval. May need assist with dc transporation.    Plan Comments Spoke with pt at bedside and confirmed PCP. PPR pharmacy changed in Epic to [facility]. Pt states she is IADL's and anticipates she will return home at dc and denies needs or concerns. List of Home Health agencies provided as PT/OT evals have been ordered. Pt may  need assist with dc transporation.              Continued Care and Services - Admitted Since 1/14/2023    Coordination has not been started for this encounter.          Demographic Summary     Row Name 01/15/23 1519       General Information    Admission Type inpatient    Arrived From emergency department    Referral Source emergency department    Reason for Consult discharge planning    Preferred Language English       Contact Information    Contact Information Obtained for                Functional Status     Row Name 01/15/23 1519       Functional Status    Usual Activity Tolerance moderate    Current Activity Tolerance fair       Functional Status, IADL    Medications independent    Meal Preparation independent    Housekeeping independent    Laundry independent    Shopping independent       Mental Status    General Appearance WDL WDL       Mental Status Summary    Recent Changes in Mental Status/Cognitive Functioning no changes              Met with patient in room wearing PPE: mask    Maintained distance greater than six feet and spent less than 15 minutes in the room    Tamara Huynh RN    Phone [phone]  Fax [phone]

## 2023-01-15 NOTE — PROGRESS NOTES
[facility]     Progress Note    Patient Name: Catalina Moreno  : 1940  MRN: [id number]  Primary Care Physician:  Rubio Dover MD  Date of admission: 2023    Subjective   Subjective     Chief Complaint: Palpitations, weakness    History of Present Illness  Patient Reports that she still feels very weak. Reports that she was able to tolerate some cream of wheat but not much. Denies any vomitting or diarrhea.     Review of Systems  Negative excepts as mentioned above    Objective   Objective     Vitals:   Temp:  [97.5 °F (36.4 °C)-98.7 °F (37.1 °C)] 97.8 °F (36.6 °C)  Heart Rate:  [] 82  Resp:  [14-25] 25  BP: ()/() 97/45  Flow (L/min):  [1-2] 2    Physical Exam  Constitutional:       General: She is not in acute distress.     Appearance: Normal appearance. She is not ill-appearing.   HENT:      Mouth/Throat:      Mouth: Mucous membranes are dry.   Eyes:      Extraocular Movements: Extraocular movements intact.      Pupils: Pupils are equal, round, and reactive to light.   Cardiovascular:      Rate and Rhythm: Normal rate. Rhythm irregular.   Pulmonary:      Effort: Pulmonary effort is normal. No respiratory distress.      Breath sounds: No wheezing or rales.   Abdominal:      General: There is no distension.      Palpations: Abdomen is soft.      Tenderness: There is no abdominal tenderness.   Neurological:      General: No focal deficit present.      Mental Status: She is alert and oriented to person, place, and time.          Result Review    Result Review:  I have personally reviewed the results from the time of this admission to 1/15/2023 12:59 EST and agree with these findings:  [x]  Laboratory list / accordion  []  Microbiology  []  Radiology  []  EKG/Telemetry   []  Cardiology/Vascular   []  Pathology  []  Old records  []  Other:  Most notable findings include:     Assessment & Plan   Assessment / Plan     Brief Patient Summary:  Catalina Moreno is a 82 y.o.  female who is admitted for atrial fibrillation with RVR    Active Hospital Problems:  Active Hospital Problems    Diagnosis    • **Atrial fibrillation with RVR ([facility])    • Moderate mitral regurgitation    • Chronic systolic CHF (congestive heart failure) ([facility])    • Restless legs syndrome    • Generalized anxiety disorder    • Pulmonary hypertension, unspecified ([facility])    • Paroxysmal atrial fibrillation ([facility])    • Essential (primary) hypertension    • Polyneuropathy, unspecified    • Coronary artery disease involving native coronary artery of native heart without angina pectoris    • Stage 3a chronic kidney disease ([facility])    • Chronic low back pain    • Depressive disorder    • Hypothyroidism      Plan:     Atrial fibrillation with RVR  -EKG reviewed  -Continue digoxin 125mcg qd  -Continue toprol 100mg qd  -Continuous cardiac monitoring  -Continue home digoxin, Eliquis  -Cardiology recommendation     Chronic systolic heart failure  Pulmonary hypertension   -EF 36 to 40% on 1/5/2023  -Continue home sildenafil     Essential Hypertension  -Controlled  -Monitor BP  -Continue home metoprolol succinate     Hyperlipidemia  -Continue home atorvastatin     Chronic pain  -Stable  -Continue home Neurontin, Zanaflex, Norco (inspect verified)  -Continue home Lidoderm patch     Depression  -Chronic, stable  -Continue home Zoloft      Hypothyroidism  -Continue home levothyroxine     Restless leg syndrome  -Continue home Requip    DVT prophylaxis:  Medical DVT prophylaxis orders are present.    CODE STATUS:    Code Status (Patient has no pulse and is not breathing): CPR (Attempt to Resuscitate)  Medical Interventions (Patient has pulse or is breathing): Full Support    Disposition:  I expect patient to be discharged early next week.    Zack Cifuentes MD

## 2023-01-15 NOTE — CONSULTS
Referring Provider: Zack Cifuentes MD    Reason for Consultation:  Atrial fibrillation with rapid ventricular response      Patient Care Team:  Rubio Dover MD as PCP - General (Family Medicine)  Flash Gonzalez MD as Consulting Physician (Cardiology)      SUBJECTIVE     Chief Complaint:  Weakness, fatigue, nausea, diarrhea, shortness of breath, palpitations    History of present illness:  Catalina Moreno is a 82 y.o. female with known non-obstructive CAD, HFrEF, HTN, HLD, paroxysmal a-fib on chronic anticoagulation, pulmonary hypertension, hypothyroidism, and chronic pain who presented to the ER at [facility] on 1/14/2023 with multiple complaints, including weakness, fatigue, nausea, diarrhea, shortness of breath, and palpitations. Workup in the ER revealed atrial fibrillation with rapid ventricular response. The patient was given Cardizem boluses and started on a drip. She was also given digoxin 125 mcg IV. She was admitted to the Hospitalist group and Cardiology was consulted for further recommendations.     At the time of my assessment the patient is alert and oriented x 3, lying in bed. She is complaining of feeling weak and fatigued. She denies any current shortness of breath, palpitations, chest pain, nausea, or vomiting. She denies any exacerbating or alleviating factors. Per her nurse, overnight the Cardizem drip was discontinued due to hypotension. She remains in a-fib, but her rate is now controlled.       Review of systems:    Constitutional: + weakness, fatigue.  No fever, rigors, chills   Eyes: No vision changes, eye pain   ENT/oropharynx: No difficulty swallowing, sore throat, epistaxis, changes in hearing   Cardiovascular: No chest pain, chest tightness, palpitations, paroxysmal nocturnal dyspnea, orthopnea, diaphoresis, dizziness / syncopal episode   Respiratory: No shortness of breath, dyspnea on exertion, cough, wheezing, hemoptysis   Gastrointestinal: No abdominal pain,  nausea, vomiting, diarrhea, bloody stools   Genitourinary: No hematuria, dysuria   Neurological: No headache, tremors, numbness, one-sided weakness    Musculoskeletal: No cramps, myalgias, joint pain, joint swelling   Integument: No rash, edema        Personal History:      Past Medical History:   Diagnosis Date   • Acute kidney injury ([facility]) 07/22/2022   • Anxiety    • Arthritis    • Atrial fibrillation ([facility])     paroxysmal   • Broken shoulder     left-- due to fall 11-7-19 was at [facility]   • Chronic systolic CHF (congestive heart failure) ([facility]) 01/05/2023    EF 36-40%   • Coronary artery disease involving native coronary artery of native heart without angina pectoris 04/02/2021    nonobstructive   • DDD (degenerative disc disease), lumbar    • Depression    • Edema     9/2020 foot   • GERD (gastroesophageal reflux disease)    • H/O fall    • Heart failure with mid-range ejection fraction ([facility]) 03/05/2022    EF 45% per 2D echo   • Hypertension    • Hypothyroidism    • Insomnia    • Low back pain    • Moderate mitral regurgitation 1/5/2023   • Neuropathy    • Pain in both feet    • PONV (postoperative nausea and vomiting)    • Pulmonary hypertension ([facility])    • Radiculopathy    • Restless legs    • Spondylolisthesis    • Stage 3a chronic kidney disease ([facility])    • Urinary incontinence        Past Surgical History:   Procedure Laterality Date   • BACK SURGERY  07/19/2018    PDC &  PSF L3-L5 insitu   • CARDIAC CATHETERIZATION N/A 4/2/2021    Procedure: Cardiac Catheterization/Vascular Study;  Surgeon: Barrett Evans MD;  Location: [facility] INVASIVE LOCATION;  Service: Cardiovascular;  Laterality: N/A;   • CHOLECYSTECTOMY     • COLONOSCOPY     • HYSTERECTOMY     • ROTATOR CUFF REPAIR Left        Family History   Problem Relation Age of Onset   • Cancer Father    • Diabetes Son    • Cancer Paternal Aunt    • Heart disease Paternal Aunt    • Cancer Paternal Uncle        Social History     Tobacco Use   •  "Smoking status: Never   • Smokeless tobacco: Never   Vaping Use   • Vaping Use: Never used   Substance Use Topics   • Alcohol use: No   • Drug use: No        (Not in a hospital admission)       Allergies:     Baclofen, Codeine, Ibuprofen, Methocarbamol, Naproxen, Tizanidine hcl, and Tolmetin    Scheduled Meds:apixaban, 5 mg, Oral, Q12H  atorvastatin, 10 mg, Oral, Nightly  digoxin, 125 mcg, Oral, Daily  gabapentin, 600 mg, Oral, 4x Daily  levothyroxine, 88 mcg, Oral, Q AM  lidocaine, 2 patch, Transdermal, Q24H  metoprolol succinate XL, 100 mg, Oral, Q24H  pantoprazole, 40 mg, Oral, Daily With Lunch  rOPINIRole, 0.25 mg, Oral, Nightly  senna-docusate sodium, 2 tablet, Oral, BID  sertraline, 50 mg, Oral, Nightly  sildenafil, 20 mg, Oral, Q8H  sodium chloride, 10 mL, Intravenous, Q12H      Continuous Infusions:sodium chloride, 50 mL/hr      PRN Meds:•  acetaminophen **OR** acetaminophen **OR** acetaminophen  •  senna-docusate sodium **AND** polyethylene glycol **AND** bisacodyl **AND** bisacodyl  •  guaifenesin  •  HYDROcodone-acetaminophen  •  melatonin  •  ondansetron **OR** ondansetron  •  [COMPLETED] Insert Peripheral IV **AND** sodium chloride  •  sodium chloride  •  sodium chloride  •  tiZANidine      OBJECTIVE    Vital Signs  Vitals:    01/15/23 0731 01/15/23 1100 01/15/23 1133 01/15/23 1206   BP: 95/49 97/53 97/45    BP Location: Right arm Right arm Right arm    Patient Position: Lying Lying Lying    Pulse: 79  74 82   Resp: 16  25    Temp: 97.8 °F (36.6 °C)      TempSrc: Oral      SpO2: 96%  93%    Weight:       Height:           Flowsheet Rows    Flowsheet Row First Filed Value   Admission Height 160 cm (63\") Documented at 01/14/2023 1927   Admission Weight 79.4 kg (175 lb) Documented at 01/14/2023 1927            Intake/Output Summary (Last 24 hours) at 1/15/2023 1401  Last data filed at 1/15/2023 0900  Gross per 24 hour   Intake 701 ml   Output 100 ml   Net 601 ml        Telemetry:  A-fib with controlled " ventricular response    Physical Exam:  The patient is alert, oriented and in no distress.  Vital signs as noted above.  Head and neck revealed no carotid bruits or jugular venous distention.  No thyromegaly or lymph adenopathy is present  Breathing unlabored.  Bilateral bases diminished.  No wheezing.  On O2 @ 2 L per NC.  Rhythm irregularly irregular.  Heart normal first and second heart sounds.   No precordial rub is present.  No gallop is present.  Abdomen soft and nontender.  No organomegaly is present.  Extremities with good peripheral pulses with 1+ edema in BLE.   Skin warm and dry.  Musculoskeletal system is grossly normal.  CNS grossly normal.       Results Review:  I have personally reviewed the results from the time of this admission to 1/15/2023 14:01 EST and agree with these findings:  [x]  Laboratory  []  Microbiology  [x]  Radiology  [x]  EKG/Telemetry   []  Cardiology/Vascular   []  Pathology  [x]  Old records  []  Other:    Most notable findings include:     Lab Results (last 24 hours)     Procedure Component Value Units Date/Time    Basic Metabolic Panel [[id number]]  (Abnormal) Collected: 01/15/23 1058    Specimen: Blood Updated: 01/15/23 1138     Glucose 146 mg/dL      BUN 17 mg/dL      Creatinine 0.91 mg/dL      Sodium 138 mmol/L      Potassium 3.9 mmol/L      Chloride 102 mmol/L      CO2 27.0 mmol/L      Calcium 8.7 mg/dL      BUN/Creatinine Ratio 18.7     Anion Gap 9.0 mmol/L      eGFR 63.5 mL/min/1.73      Comment: National Kidney Foundation and American Society of Nephrology (ASN) Task Force recommended calculation based on the Chronic Kidney Disease Epidemiology Collaboration (CKD-EPI) equation refit without adjustment for race.       Narrative:      GFR Normal >60  Chronic Kidney Disease <60  Kidney Failure <15    The GFR formula is only valid for adults with stable renal function between ages 18 and 70.    CBC & Differential [[id number]]  (Abnormal) Collected: 01/15/23 1058    Specimen:  Blood Updated: 01/15/23 1120    Narrative:      The following orders were created for panel order CBC & Differential.  Procedure                               Abnormality         Status                     ---------                               -----------         ------                     CBC Auto Differential[[id number]]        Abnormal            Final result                 Please view results for these tests on the individual orders.    CBC Auto Differential [[id number]]  (Abnormal) Collected: 01/15/23 1058    Specimen: Blood Updated: 01/15/23 1120     WBC 4.30 10*3/mm3      RBC 3.65 10*6/mm3      Hemoglobin 9.7 g/dL      Hematocrit 30.3 %      MCV 82.9 fL      MCH 26.5 pg      MCHC 32.0 g/dL      RDW 18.0 %      RDW-SD 55.6 fl      MPV 8.6 fL      Platelets 198 10*3/mm3      Neutrophil % 48.9 %      Lymphocyte % 34.2 %      Monocyte % 11.1 %      Eosinophil % 4.4 %      Basophil % 1.4 %      Neutrophils, Absolute 2.10 10*3/mm3      Lymphocytes, Absolute 1.50 10*3/mm3      Monocytes, Absolute 0.50 10*3/mm3      Eosinophils, Absolute 0.20 10*3/mm3      Basophils, Absolute 0.10 10*3/mm3      nRBC 0.1 /100 WBC     Weaver Draw [[id number]] Collected: 01/14/23 2022    Specimen: Blood Updated: 01/14/23 2131    Narrative:      The following orders were created for panel order Weaver Draw.  Procedure                               Abnormality         Status                     ---------                               -----------         ------                     Green Top (Gel)[[id number]]                                  Final result               Lavender Top[[id number]]                                     Final result               Gold Top - SST[[id number]]                                   Final result               Light Blue Top[[id number]]                                   Final result                 Please view results for these tests on the individual orders.    Gold Top - SST [[id number]] Collected: 01/14/23  2022    Specimen: Blood Updated: 01/14/23 2131     Extra Tube Hold for add-ons.     Comment: Auto resulted.       Light Blue Top [[id number]] Collected: 01/14/23 2022    Specimen: Blood Updated: 01/14/23 2131     Extra Tube Hold for add-ons.     Comment: Auto resulted       Lavender Top [[id number]] Collected: 01/14/23 2022    Specimen: Blood Updated: 01/14/23 2131     Extra Tube hold for add-on     Comment: Auto resulted       Respiratory Panel PCR w/COVID-19(SARS-CoV-2) WENDI/MIKAYLA/KEVEN/PAD/COR/MAD/DWIGHT In-House, NP Swab in UTM/VTM, 3-4 HR TAT - Swab, Nasopharynx [[id number]]  (Normal) Collected: 01/14/23 2011    Specimen: Swab from Nasopharynx Updated: 01/14/23 2105     ADENOVIRUS, PCR Not Detected     Coronavirus 229E Not Detected     Coronavirus HKU1 Not Detected     Coronavirus NL63 Not Detected     Coronavirus OC43 Not Detected     COVID19 Not Detected     Human Metapneumovirus Not Detected     Human Rhinovirus/Enterovirus Not Detected     Influenza A PCR Not Detected     Influenza B PCR Not Detected     Parainfluenza Virus 1 Not Detected     Parainfluenza Virus 2 Not Detected     Parainfluenza Virus 3 Not Detected     Parainfluenza Virus 4 Not Detected     RSV, PCR Not Detected     Bordetella pertussis pcr Not Detected     Bordetella parapertussis PCR Not Detected     Chlamydophila pneumoniae PCR Not Detected     Mycoplasma pneumo by PCR Not Detected    Narrative:      In the setting of a positive respiratory panel with a viral infection PLUS a negative procalcitonin without other underlying concern for bacterial infection, consider observing off antibiotics or discontinuation of antibiotics and continue supportive care. If the respiratory panel is positive for atypical bacterial infection (Bordetella pertussis, Chlamydophila pneumoniae, or Mycoplasma pneumoniae), consider antibiotic de-escalation to target atypical bacterial infection.    Urinalysis With Microscopic If Indicated (No Culture) - Urine, Catheter  [[id number]]  (Abnormal) Collected: 01/14/23 2052    Specimen: Urine, Catheter Updated: 01/14/23 2104     Color, UA Yellow     Appearance, UA Clear     pH, UA 7.5     Specific Gravity, UA 1.008     Glucose, UA Negative     Ketones, UA Negative     Bilirubin, UA Negative     Blood, UA Negative     Protein, UA 30 mg/dL (1+)     Leuk Esterase, UA Negative     Nitrite, UA Negative     Urobilinogen, UA 0.2 E.U./dL    Green Top (Gel) [[id number]] Collected: 01/14/23 2022    Specimen: Blood Updated: 01/14/23 2104    Urinalysis, Microscopic Only - Urine, Catheter [[id number]]  (Abnormal) Collected: 01/14/23 2052    Specimen: Urine, Catheter Updated: 01/14/23 2104     RBC, UA 0-2 /HPF      WBC, UA 0-2 /HPF      Bacteria, UA None Seen /HPF      Squamous Epithelial Cells, UA None Seen /HPF      Hyaline Casts, UA None Seen /LPF      Methodology Automated Microscopy    Digoxin Level [[id number]]  (Abnormal) Collected: 01/14/23 2022    Specimen: Blood Updated: 01/14/23 2053     Digoxin 0.40 ng/mL     Comprehensive Metabolic Panel [[id number]]  (Abnormal) Collected: 01/14/23 2022    Specimen: Blood Updated: 01/14/23 2053     Glucose 107 mg/dL      BUN 10 mg/dL      Creatinine 0.86 mg/dL      Sodium 140 mmol/L      Potassium 4.0 mmol/L      Chloride 102 mmol/L      CO2 26.0 mmol/L      Calcium 9.6 mg/dL      Total Protein 7.4 g/dL      Albumin 4.2 g/dL      ALT (SGPT) 13 U/L      AST (SGOT) 17 U/L      Alkaline Phosphatase 147 U/L      Total Bilirubin 1.8 mg/dL      Globulin 3.2 gm/dL      A/G Ratio 1.3 g/dL      BUN/Creatinine Ratio 11.6     Anion Gap 12.0 mmol/L      eGFR 68.0 mL/min/1.73      Comment: National Kidney Foundation and American Society of Nephrology (ASN) Task Force recommended calculation based on the Chronic Kidney Disease Epidemiology Collaboration (CKD-EPI) equation refit without adjustment for race.       Narrative:      GFR Normal >60  Chronic Kidney Disease <60  Kidney Failure <15    The GFR formula is only valid  for adults with stable renal function between ages 18 and 70.    Troponin [[id number]]  (Normal) Collected: 01/14/23 2022    Specimen: Blood Updated: 01/14/23 2050     Troponin T <0.010 ng/mL     Narrative:      Troponin T Reference Range:  <= 0.03 ng/mL-   Negative for AMI  >0.03 ng/mL-     Abnormal for myocardial necrosis.  Clinicians would have to utilize clinical acumen, EKG, Troponin and serial changes to determine if it is an Acute Myocardial Infarction or myocardial injury due to an underlying chronic condition.       Results may be falsely decreased if patient taking Biotin.      CK [[id number]]  (Normal) Collected: 01/14/23 2022    Specimen: Blood Updated: 01/14/23 2050     Creatine Kinase 50 U/L     Magnesium [[id number]]  (Normal) Collected: 01/14/23 2022    Specimen: Blood Updated: 01/14/23 2050     Magnesium 1.9 mg/dL     BNP [[id number]]  (Abnormal) Collected: 01/14/23 2022    Specimen: Blood Updated: 01/14/23 2047     proBNP 9,487.0 pg/mL     Narrative:      Among patients with dyspnea, NT-proBNP is highly sensitive for the detection of acute congestive heart failure. In addition NT-proBNP of <300 pg/ml effectively rules out acute congestive heart failure with 99% negative predictive value.      CBC & Differential [[id number]]  (Abnormal) Collected: 01/14/23 2022    Specimen: Blood Updated: 01/14/23 2028    Narrative:      The following orders were created for panel order CBC & Differential.  Procedure                               Abnormality         Status                     ---------                               -----------         ------                     CBC Auto Differential[[id number]]        Abnormal            Final result                 Please view results for these tests on the individual orders.    CBC Auto Differential [[id number]]  (Abnormal) Collected: 01/14/23 2022    Specimen: Blood Updated: 01/14/23 2028     WBC 6.90 10*3/mm3      RBC 4.34 10*6/mm3      Hemoglobin 11.2 g/dL       Hematocrit 36.3 %      MCV 83.7 fL      MCH 25.8 pg      MCHC 30.8 g/dL      RDW 17.7 %      RDW-SD 51.2 fl      MPV 8.3 fL      Platelets 233 10*3/mm3      Neutrophil % 65.8 %      Lymphocyte % 23.0 %      Monocyte % 7.7 %      Eosinophil % 1.6 %      Basophil % 1.9 %      Neutrophils, Absolute 4.50 10*3/mm3      Lymphocytes, Absolute 1.60 10*3/mm3      Monocytes, Absolute 0.50 10*3/mm3      Eosinophils, Absolute 0.10 10*3/mm3      Basophils, Absolute 0.10 10*3/mm3      nRBC 0.0 /100 WBC           Imaging Results (Last 24 Hours)     Procedure Component Value Units Date/Time    XR Chest 1 View [[id number]] Collected: 01/14/23 2013     Updated: 01/14/23 2016    Narrative:      XR CHEST 1 VW    Date of Exam: 1/14/2023 8:02 PM EST    Indication: Chest pain short of breath.    Comparison: 1/4/2023    Findings:  Heart is mildly enlarged. Patient is rotated to the right. The lungs are clear. No pneumothorax or pleural effusion. The osseous structures appear intact.      Impression:      Impression:  Stable chronic findings without acute process.    Electronically Signed: Edil Mouser    1/14/2023 8:14 PM EST    Workstation ID: [id number]          LAB RESULTS (LAST 7 DAYS)    CBC  Results from last 7 days   Lab Units 01/15/23  1058 01/14/23  2022 01/10/23  0339 01/09/23  0307   WBC 10*3/mm3 4.30 6.90 4.50 3.80   RBC 10*6/mm3 3.65* 4.34 3.61* 3.30*   HEMOGLOBIN g/dL 9.7* 11.2* 9.5* 8.7*   HEMATOCRIT % 30.3* 36.3 31.2* 28.2*   MCV fL 82.9 83.7 86.4 85.4   PLATELETS 10*3/mm3 198 233 171 155       BMP  Results from last 7 days   Lab Units 01/15/23  1058 01/14/23  2022 01/10/23  0339 01/09/23  0307   SODIUM mmol/L 138 140 141 141   POTASSIUM mmol/L 3.9 4.0 4.8 4.7   CHLORIDE mmol/L 102 102 103 106   CO2 mmol/L 27.0 26.0 29.0 29.0   BUN mg/dL 17 10 21 17   CREATININE mg/dL 0.91 0.86 1.25* 1.24*   GLUCOSE mg/dL 146* 107* 132* 104*   MAGNESIUM mg/dL  --  1.9 2.1 2.0   PHOSPHORUS mg/dL  --   --  4.4 3.8       CMP   Results from last 7  days   Lab Units 01/15/23  1058 01/14/23  2022 01/10/23  0339 01/09/23  0307   SODIUM mmol/L 138 140 141 141   POTASSIUM mmol/L 3.9 4.0 4.8 4.7   CHLORIDE mmol/L 102 102 103 106   CO2 mmol/L 27.0 26.0 29.0 29.0   BUN mg/dL 17 10 21 17   CREATININE mg/dL 0.91 0.86 1.25* 1.24*   GLUCOSE mg/dL 146* 107* 132* 104*   ALBUMIN g/dL  --  4.2 3.8 3.1*   BILIRUBIN mg/dL  --  1.8* 0.5 0.5   ALK PHOS U/L  --  147* 136* 113   AST (SGOT) U/L  --  17 27 28   ALT (SGPT) U/L  --  13 17 17       BNP        TROPONIN  Results from last 7 days   Lab Units 01/14/23 2022   CK TOTAL U/L 50   TROPONIN T ng/mL <0.010       CoAg        Creatinine Clearance  Estimated Creatinine Clearance: 48.4 mL/min (by C-G formula based on SCr of 0.91 mg/dL).    ABG          Radiology  XR Chest 1 View    Result Date: 1/14/2023  Impression: Stable chronic findings without acute process. Electronically Signed: Edil Joshua  1/14/2023 8:14 PM EST  Workstation ID: [id number]        EKG  I personally viewed and interpreted the patient's EKG/Telemetry data:  ECG 12 Lead Chest Pain   Preliminary Result   HEART RATE= 146  bpm   RR Interval= 411  ms   DE Interval=   ms   P Horizontal Axis=   deg   P Front Axis=   deg   QRSD Interval= 62  ms   QT Interval=   ms   QRS Axis= 73  deg   T Wave Axis= 87  deg   - ABNORMAL ECG -   Atrial fibrillation with rapid V-rate   Consider anteroseptal infarct   When compared with ECG of 05-Jan-2023 5:46:22,   Significant change in rhythm   Electronically Signed By:    Date and Time of Study: 2023-01-14 19:45:01            Echocardiogram:    Results for orders placed during the hospital encounter of 01/04/23    Adult Transthoracic Echo Complete With Contrast if Necessary Per Protocol    Interpretation Summary  •  Left ventricular ejection fraction appears to be 36 - 40%.  •  There is calcification of the aortic valve.  •  Moderate mitral valve regurgitation is present.  •  Estimated right ventricular systolic pressure from tricuspid  regurgitation is normal (<35 mmHg).  •  No pericardial effusion noted        Stress Test:  Results for orders placed during the hospital encounter of 03/31/21    Stress Test With Myocardial Perfusion One Day    Interpretation Summary  · Findings consistent with a normal ECG stress test.  · Left ventricular ejection fraction is hyperdynamic (Calculated EF > 70%). .  · Impressions are consistent with a study indicating uncertain risk.  · Large apical defect with some reperfusion during the rest images cannot rule out ischemia EF 76% Clinical correlation is recommended.    Electronically signed by KALYANI Tay, 04/01/21, 11:25 AM EDT.        Cardiac Catheterization:  Results for orders placed during the hospital encounter of 03/31/21    Cardiac Catheterization/Vascular Study    Narrative  CARDIAC CATHETERIZATION REPORT    DATE OF PROCEDURE: 4/2/2021      INDICATION FOR PROCEDURE: Abnormal stress test    PROCEDURE PERFORMED: Left heart catheterization, coronary angiography,    PROCEDURE COMMENTS:    All the risks and benefits explained with the patient informed consent was obtained from the patient.  Patient was brought to the cardiac catheterization laboratory placed on the table draped and prepped in the usual sterile fashion.  2% lidocaine was used to anesthetize the right groin area.  Using the modified Seldinger technique 5 Hungarian sheath was inserted into the right femoral artery.    5 Hungarian JL4 catheter was used to perform the selective left coronary angiography    5 Hungarian JR4 catheter was used to perform the selective right coronary angiography    Angio-Seal was placed after exchanging five Hungarian sheath with six Hungarian sheath    Patient tolerated procedure well without any immediate complications      FINDINGS:    1. HEMODYNAMICS:  LV end-diastolic pressure 7 mmHg, /80 mmHg.    2. LEFT VENTRICULOGRAPHY: Not done patient had echocardiogram    3. CORONARY ANGIOGRAPHY:  Dominance right  Left  Main: Large-caliber vessel bifurcates into LAD circumflex artery 0% stenosis  LAD: Large-caliber vessel gives rise to couple of medium caliber diagonal arteries multiple septal branches reaches the apex mild luminal irregularities noted less than 10% stenosis  CIRC: Large-caliber vessel gives rise to marginal lateral branches less than 10% stenosis  RCA: Large-caliber dominant artery gives rise to PDA and PL branches less than 5 to 10% stenosis    SUMMARY: No obstructive coronary artery disease    RECOMMENDATIONS: Evaluate for noncardiac causes of symptoms aggressive cardiac risk factor modification        Other:      ASSESSMENT & PLAN:    Principal Problem:    Atrial fibrillation with RVR ([facility])  Active Problems:    Depressive disorder    Hypothyroidism    Stage 3a chronic kidney disease ([facility])    Chronic low back pain    Generalized anxiety disorder    Polyneuropathy, unspecified    Restless legs syndrome    Paroxysmal atrial fibrillation ([facility])    Essential (primary) hypertension    Pulmonary hypertension, unspecified ([facility])    Chronic systolic CHF (congestive heart failure) ([facility])    Coronary artery disease involving native coronary artery of native heart without angina pectoris    Moderate mitral regurgitation    Class 1 obesity due to excess calories with serious comorbidity and body mass index (BMI) of 30.0 to 30.9 in adult    Hypotension    Atrial fibrillation with RVR   Paroxysmal atrial fibrillation   -K 4.0, Mg 1.9, TSH 5.660  -given digoxin 125 mcg IV, Cardizem boluses and started on drip in ER  -now off Cardizem and rate controlled  -continue home digoxin and Toprol XL  -[id number] score 6  -continue home Eliquis   -outpatient referral to sleep medicine ordered  -per recent echo, EF has decreased from 45% to 36-40%  -recommend ischemic workup    Hypotension  -likely secondary to Cardizem drip  -now off Cardizem  -gentle IV hydration  -closely monitor BP      Pulmonary hypertension, unspecified   -continue  sildenafil     Chronic systolic CHF (congestive heart failure)  -per recent echo, EF has decreased from 45% to 36-40%  -recommend ischemic workup  -continue BB  -ACE-I/ARB currently contraindicated d/t hypotension     Coronary artery disease involving native coronary artery of native heart without angina pectoris  Essential (primary) hypertension, chronic  -non-obstructive CAD per [facility] done 4/2/21  -continue Eliquis, atorvastatin, and Toprol XL    Moderate mitral regurgitation  -recent finding on 2D echo    Depressive disorder  Generalized anxiety disorder  -continue Zoloft     Hypothyroidism  -TSH 5.660, T4 1.16  -continue levothyroxine    Stage 3a chronic kidney disease   -appears at baseline  -monitor BMP    Chronic low back pain  Polyneuropathy, unspecified  -continue Lidocaine patch, gabapentin    Restless legs syndrome  -continue Requip     Class 1 obesity due to excess calories with serious comorbidity and body mass index (BMI) of 30.0 to 30.9 in adult  -BMI 30.98  -encourage lifestyle modifications     Electronically signed by KALYANI Myrick, 01/15/23, 2:18 PM EST.      KALYANI Myrick  01/15/23  14:01 EST                  Patient seen and examined  EKGs have been reviewed for last several years.  Patient was in sinus rhythm back in March and April 2021.  EKG starting in number of 2021 patient started having atrial fibrillation.  Currently patient is having atrial fibrillation for nearly a year and a half.  Feels poorly with ongoing atrial fibrillation.  Cardiac work-up revealed progressive development of LV dysfunction on this patient.  EKG is attached above.  On physical examination      Physical Exam    General:      well developed, well nourished, in no acute distress.    Head:      normocephalic and atraumatic.    Eyes:      PERRL/EOM intact, conjunctivae and sclerae clear without nystagmus.    Neck:      no  thyromegaly, trachea central with normal respiratory effort  Lungs:      clear  bilaterally to auscultation.    Heart:       irregular rate and rhythm, S1, S2 without murmurs, rubs, or gallops  Skin:      intact without lesions or rashes.    Psych:      alert and cooperative; normal mood and affect; normal attention span and concentration.        Patient will significantly feel better in sinus rhythm or better rate control strategy for atrial fibrillation.  Since patient has never been cardioverted or treated pharmacologically to get the patient to sinus rhythm, I would recommend that the patient's digoxin be stopped and started on Tikosyn.    Orders for Tikosyn placed.    If the patient converts spontaneously with Tikosyn therapy, patient can be treated with rhythm control strategy and other option is to do cardioversion 48 hours and maintain sinus rhythm on Tikosyn.  If the patient's arrhythmia cannot be controlled, I would recommend biventricular pacing and AV node ablation.        Electronically signed by Baljeet Valero MD, 01/15/23, 2:50 PM EST.

## 2023-01-15 NOTE — PLAN OF CARE
Goal Outcome Evaluation:      Pt admitted on a Cardizem gtt for afib rvr. Pt still fib but rate and bp dropped while on drip, drip stopped. Cardiology to see this am.

## 2023-01-15 NOTE — ED PROVIDER NOTES
Subjective   History of Present Illness  Chief complaint General weakness nausea sick to my stomach diarrhea short of breath heart racing    History of present illness 82-year-old female who presents ER complaining about a 2-day history of some general weakness nausea diarrhea no appetite sick to her stomach shortness of breath and now heart racing.  No chest pain neck arm jaw pain.  No fever some chills have been noted.  He has had a recent hospitalization for A. fib but no change in medications.  No foreign travels no antibiotic use no urinary problems no black or bloody stool.  No one at home with similar illness patient does live alone.  No other complaints or associated symptoms        Review of Systems   Constitutional: Positive for chills. Negative for fever.   Respiratory: Positive for shortness of breath. Negative for cough and chest tightness.    Cardiovascular: Positive for palpitations. Negative for chest pain.   Gastrointestinal: Positive for abdominal pain, diarrhea and nausea. Negative for anal bleeding and vomiting.   Genitourinary: Negative for difficulty urinating and dysuria.   Musculoskeletal: Negative for back pain and neck pain.   Skin: Negative for rash and wound.   Neurological: Positive for weakness. Negative for dizziness and light-headedness.   Psychiatric/Behavioral: Negative for agitation and confusion.       Past Medical History:   Diagnosis Date   • Acute kidney injury (HCC) 7/22/2022   • Anxiety    • Arthritis    • Atrial fibrillation (HCC)     paroxysmal   • Broken shoulder     left-- due to fall 11-7-19 was at [facility]   • Buttock pain     rt side   • DDD (degenerative disc disease), lumbar    • Depression    • Edema     9/2020 foot   • GERD (gastroesophageal reflux disease)    • H/O fall    • Hip pain     rt   • Hypertension    • Hypothyroidism    • Insomnia    • Irregular heart beat    • Leg pain     rt   • Low back pain    • Neuropathy    • Pain in both feet    • PONV (postoperative  nausea and vomiting)    • Pulmonary hypertension (HCC)    • Radiculopathy    • Restless legs    • Spondylolisthesis    • Urinary incontinence        Allergies   Allergen Reactions   • Baclofen Rash   • Codeine Nausea Only   • Ibuprofen Unknown (See Comments)     Patient doesn't know----Motin   • Methocarbamol Unknown (See Comments)     Patient doesn't know   • Naproxen Unknown (See Comments)     Pt. Doesn't know    • Tizanidine Hcl Other (See Comments)     Syncope    • Tolmetin Dizziness     Pt. Doesn't know-- same as Tolectin       Past Surgical History:   Procedure Laterality Date   • BACK SURGERY  07/19/2018    PDC &  PSF L3-L5 insitu   • CARDIAC CATHETERIZATION N/A 4/2/2021    Procedure: Cardiac Catheterization/Vascular Study;  Surgeon: Barrett Evans MD;  Location: [facility] INVASIVE LOCATION;  Service: Cardiovascular;  Laterality: N/A;   • CHOLECYSTECTOMY     • COLONOSCOPY     • HYSTERECTOMY     • ROTATOR CUFF REPAIR Left        Family History   Problem Relation Age of Onset   • Cancer Father    • Diabetes Son    • Cancer Paternal Aunt    • Heart disease Paternal Aunt    • Cancer Paternal Uncle        Social History     Socioeconomic History   • Marital status:    Tobacco Use   • Smoking status: Never   • Smokeless tobacco: Never   Vaping Use   • Vaping Use: Never used   Substance and Sexual Activity   • Alcohol use: No   • Drug use: No   • Sexual activity: Defer     Prior to Admission medications    Medication Sig Start Date End Date Taking? Authorizing Provider   apixaban (ELIQUIS) 5 MG tablet tablet Take 1 tablet by mouth Every 12 (Twelve) Hours. Indications: Atrial Fibrillation 1/10/23   Dom Murray MD   atorvastatin (LIPITOR) 10 MG tablet Take 1 tablet by mouth Every Night. 8/11/22   Flash Gonzalez MD   digoxin (LANOXIN) 125 MCG tablet Take 1 tablet by mouth Daily. 9/14/22   Flash Gonzalez MD   famotidine (PEPCID) 20 MG tablet Take 20 mg by mouth Daily. Indications: Heartburn  6/19/19   Elton Flores MD   gabapentin (NEURONTIN) 600 MG tablet TAKE 1 TABLET BY MOUTH FOUR TIMES DAILY 11/29/22   Shefali Worthy MD   HYDROcodone-acetaminophen (NORCO) 7.5-325 MG per tablet Take 1 tablet by mouth 4 (Four) Times a Day As Needed for Severe Pain. DNF before 11/2/2022 11/2/22   Shefali Worthy MD   levothyroxine (SYNTHROID, LEVOTHROID) 88 MCG tablet Take 88 mcg by mouth Daily. Indications: Underactive Thyroid    Elton Flores MD   lidocaine (LIDODERM) 5 % UNWRAP AND APPLY 2 PATCH(ES) ON THE SKIN DAILY AS DIRECTED BY PROVIDER. REMOVE AND DISCARD PATCH(ES) WITHIN 12 HOURS OR AS DIRECTED BY MD 7/26/22   Godfrey Ling MD   loperamide (IMODIUM A-D) 2 MG tablet Take 2 mg by mouth 4 (Four) Times a Day As Needed for Diarrhea. Indications: Diarrhea 8/29/22   Elton Flores MD   metoprolol succinate XL (TOPROL-XL) 100 MG 24 hr tablet Take 1 tablet by mouth Daily. Indications: High Blood Pressure Disorder 1/11/23   Dom Murray MD   pantoprazole (PROTONIX) 40 MG EC tablet Take 40 mg by mouth Daily With Lunch. Afternoons  Indications: Gastroesophageal Reflux Disease 6/24/19   Etlon Flores MD   rOPINIRole (REQUIP) 0.25 MG tablet Take 1 tablet by mouth Every Night. Take 1 hour before bedtime 10/4/22   Shefali Worthy MD   sertraline (ZOLOFT) 50 MG tablet Take 50 mg by mouth Every Night. Indications: Major Depressive Disorder 6/4/20   Elton Flores MD   sildenafil (REVATIO) 20 MG tablet Indications: Pulmonary Arterial Hypertension 9/23/22   Elton Flores MD   tiZANidine (ZANAFLEX) 4 MG tablet Take 4 mg by mouth 2 (Two) Times a Day As Needed. Indications: Musculoskeletal Pain 9/13/22   Elton Flores MD   vitamin B-12 (VITAMIN B-12) 1000 MCG tablet Take 1 tablet by mouth Daily. 11/17/21   Shahla Cardenas MD           Objective   Physical Exam  Constitutional is an 82-year-old female awake alert no acute distress temperature 98.7  patient's blood pressure initially 185/130 heart rate at triage 136 but when I am in the room with the patient on the monitor is 150 respirations 18 sats 97% on room air.  HEENT extraocular muscles are intact pupils equal react sclera clear mouth clear.  Neck supple no adenopathy no JVD no bruits no meningeal signs.  Lungs clear no retraction no use of accessories.  Heart is irregular tachycardic without murmur.  Abdomen soft nontender good bowel sounds no peritoneal findings or pulsatile masses.  Remedies pulses equal without upper and lower extremities raise edema no cords no Homans' sign no evidence of DVT.  Skin warm and dry without rashes or cellulitic changes.  Neurologic awake alert and follows commands motor strength normal without focal weakness  Procedures           ED Course      Results for orders placed or performed during the hospital encounter of 01/14/23   Respiratory Panel PCR w/COVID-19(SARS-CoV-2) WENDI/MIKAYLA/KEVEN/PAD/COR/MAD/DWIGHT In-House, NP Swab in UTM/VTM, 3-4 HR TAT - Swab, Nasopharynx    Specimen: Nasopharynx; Swab   Result Value Ref Range    ADENOVIRUS, PCR Not Detected Not Detected    Coronavirus 229E Not Detected Not Detected    Coronavirus HKU1 Not Detected Not Detected    Coronavirus NL63 Not Detected Not Detected    Coronavirus OC43 Not Detected Not Detected    COVID19 Not Detected Not Detected - Ref. Range    Human Metapneumovirus Not Detected Not Detected    Human Rhinovirus/Enterovirus Not Detected Not Detected    Influenza A PCR Not Detected Not Detected    Influenza B PCR Not Detected Not Detected    Parainfluenza Virus 1 Not Detected Not Detected    Parainfluenza Virus 2 Not Detected Not Detected    Parainfluenza Virus 3 Not Detected Not Detected    Parainfluenza Virus 4 Not Detected Not Detected    RSV, PCR Not Detected Not Detected    Bordetella pertussis pcr Not Detected Not Detected    Bordetella parapertussis PCR Not Detected Not Detected    Chlamydophila pneumoniae PCR Not  Detected Not Detected    Mycoplasma pneumo by PCR Not Detected Not Detected   Comprehensive Metabolic Panel    Specimen: Blood   Result Value Ref Range    Glucose 107 (H) 65 - 99 mg/dL    BUN 10 8 - 23 mg/dL    Creatinine 0.86 0.57 - 1.00 mg/dL    Sodium 140 136 - 145 mmol/L    Potassium 4.0 3.5 - 5.2 mmol/L    Chloride 102 98 - 107 mmol/L    CO2 26.0 22.0 - 29.0 mmol/L    Calcium 9.6 8.6 - 10.5 mg/dL    Total Protein 7.4 6.0 - 8.5 g/dL    Albumin 4.2 3.5 - 5.2 g/dL    ALT (SGPT) 13 1 - 33 U/L    AST (SGOT) 17 1 - 32 U/L    Alkaline Phosphatase 147 (H) 39 - 117 U/L    Total Bilirubin 1.8 (H) 0.0 - 1.2 mg/dL    Globulin 3.2 gm/dL    A/G Ratio 1.3 g/dL    BUN/Creatinine Ratio 11.6 7.0 - 25.0    Anion Gap 12.0 5.0 - 15.0 mmol/L    eGFR 68.0 >60.0 mL/min/1.73   Urinalysis With Microscopic If Indicated (No Culture) - Urine, Catheter    Specimen: Urine, Catheter   Result Value Ref Range    Color, UA Yellow Yellow, Straw    Appearance, UA Clear Clear    pH, UA 7.5 5.0 - 8.0    Specific Gravity, UA 1.008 1.005 - 1.030    Glucose, UA Negative Negative    Ketones, UA Negative Negative    Bilirubin, UA Negative Negative    Blood, UA Negative Negative    Protein, UA 30 mg/dL (1+) (A) Negative    Leuk Esterase, UA Negative Negative    Nitrite, UA Negative Negative    Urobilinogen, UA 0.2 E.U./dL 0.2 - 1.0 E.U./dL   Troponin    Specimen: Blood   Result Value Ref Range    Troponin T <0.010 0.000 - 0.030 ng/mL   BNP    Specimen: Blood   Result Value Ref Range    proBNP 9,487.0 (H) 0.0 - 1,800.0 pg/mL   Magnesium    Specimen: Blood   Result Value Ref Range    Magnesium 1.9 1.6 - 2.4 mg/dL   CK    Specimen: Blood   Result Value Ref Range    Creatine Kinase 50 20 - 180 U/L   CBC Auto Differential    Specimen: Blood   Result Value Ref Range    WBC 6.90 3.40 - 10.80 10*3/mm3    RBC 4.34 3.77 - 5.28 10*6/mm3    Hemoglobin 11.2 (L) 12.0 - 15.9 g/dL    Hematocrit 36.3 34.0 - 46.6 %    MCV 83.7 79.0 - 97.0 fL    MCH 25.8 (L) 26.6 - 33.0 pg     MCHC 30.8 (L) 31.5 - 35.7 g/dL    RDW 17.7 (H) 12.3 - 15.4 %    RDW-SD 51.2 37.0 - 54.0 fl    MPV 8.3 6.0 - 12.0 fL    Platelets 233 140 - 450 10*3/mm3    Neutrophil % 65.8 42.7 - 76.0 %    Lymphocyte % 23.0 19.6 - 45.3 %    Monocyte % 7.7 5.0 - 12.0 %    Eosinophil % 1.6 0.3 - 6.2 %    Basophil % 1.9 (H) 0.0 - 1.5 %    Neutrophils, Absolute 4.50 1.70 - 7.00 10*3/mm3    Lymphocytes, Absolute 1.60 0.70 - 3.10 10*3/mm3    Monocytes, Absolute 0.50 0.10 - 0.90 10*3/mm3    Eosinophils, Absolute 0.10 0.00 - 0.40 10*3/mm3    Basophils, Absolute 0.10 0.00 - 0.20 10*3/mm3    nRBC 0.0 0.0 - 0.2 /100 WBC   Digoxin Level    Specimen: Blood   Result Value Ref Range    Digoxin 0.40 (L) 0.60 - 1.20 ng/mL   Urinalysis, Microscopic Only - Urine, Catheter    Specimen: Urine, Catheter   Result Value Ref Range    RBC, UA 0-2 (A) None Seen /HPF    WBC, UA 0-2 (A) None Seen /HPF    Bacteria, UA None Seen None Seen /HPF    Squamous Epithelial Cells, UA None Seen None Seen, 0-2 /HPF    Hyaline Casts, UA None Seen None Seen /LPF    Methodology Automated Microscopy      XR Chest 1 View    Result Date: 1/14/2023  Impression: Stable chronic findings without acute process. Electronically Signed: Edil Meltonr  1/14/2023 8:14 PM EST  Workstation ID: [id number]    Medications   sodium chloride 0.9 % flush 10 mL (has no administration in time range)   dilTIAZem (CARDIZEM) 125 mg in 125 mL D5W infusion (5 mg/hr Intravenous New Bag 1/14/23 2020)   dilTIAZem (CARDIZEM) injection 20 mg (20 mg Intravenous Given 1/14/23 2020)   benzonatate (TESSALON) capsule 100 mg (100 mg Oral Given 1/14/23 2120)   digoxin (LANOXIN) injection 125 mcg (125 mcg Intravenous Given 1/14/23 2120)   HYDROcodone-acetaminophen (NORCO) 7.5-325 MG per tablet 1 tablet (1 tablet Oral Given 1/14/23 2126)   dilTIAZem (CARDIZEM) injection 20 mg (20 mg Intravenous Given 1/14/23 2127)     EKG my interpretation atrial fibrillation rate of 150 and evidence of previous anteroseptal infarct  QTC of 496 this is unchanged from previous EKG other than a faster rate.  And this is compared to an EKG from January 5, 2023 abnormal EKG                                       Medical Decision Making  Medical decision making.  Patient is an elderly female presents with some weakness and A. fib with RVR which can be potentially life-threatening.  Patient placed on a monitor extensive ER evaluation was established.  Monitor showed A. fib with RVR my review.  Patient had an EKG obtained showed atrial fibrillation with a rate in the 150 range without acute findings otherwise an abnormal this is compared to previous EKG from January 5 from previous hospitalization.  Labs obtained respiratory panel was negative.  She was unremarkable and a bilirubin 1.8 alk phos of 147-130 of protein troponin negative proBNP 9004 and a serum magnesium normal.  CK normal potassium was 4 hemoglobin was 11.2 white count normal all these labs reviewed by me independently.  The patient had a chest x-ray obtained reviewed by me as well as radiology with chronic findings nothing acute.  The patient subsequently for this A. fib with RVR started on Cardizem she was initially given a bolus of 20 mg IV she had a low blood pressure as well at the 180/130 range.  The patient was placed on a drip at 5 mg an hour.  This seemed to control this down to about 100 when she would move around would bounce up to 120.  Her dig level is only 0.4 she was given additional dose of IV digitalis at 0.125 mics IV.  An additional Cardizem at 20 mg IV as blood pressure was still somewhat elevated.  We will use this to control her rate she is already on Eliquis and is already anticoagulated.  Patient records reviewed she was just in the hospital here for atrial fibrillation with RVR.  She had a rate controlled and ultimately stabilized was seen by cardiology but no changes or further cardiac work-up was suggested at that time.  Patient was subsequently discharged home.   This is on January 10.  These records reviewed by me.  She had an echocardiogram on the sixth that showed an ejection fraction about 36%.  Calcification on aortic valve.  She was made aware of findings.  I will see evidence of suggest acute myocardial infarction I will see evidence that suggest acute DVT or pulmonary embolism or aortic dissection no evidence of sepsis on exam.  No evidence that suggest an acute intra-abdominal process.  Not a complete list of all possibilities.  But she will be admitted to the hospital for further work-up and care she is improved here rate is much improved from previous and she is feeling better.  Hospitalist nurse practitioner notified.  Case discussed.  Critical care 30-minute    Atrial fibrillation with RVR (HCC): acute illness or injury  Weakness: acute illness or injury  Amount and/or Complexity of Data Reviewed  External Data Reviewed: ECG and notes.  Labs: ordered. Decision-making details documented in ED Course.  Radiology: ordered and independent interpretation performed. Decision-making details documented in ED Course.  ECG/medicine tests: ordered and independent interpretation performed. Decision-making details documented in ED Course.      Risk  Prescription drug management.  Drug therapy requiring intensive monitoring for toxicity.  Decision regarding hospitalization.          Final diagnoses:   Atrial fibrillation with RVR (HCC)   Weakness       ED Disposition  ED Disposition     ED Disposition   Decision to Admit    Condition   --    Comment   Level of Care: Telemetry [5]   Admitting Physician: SANDRA EWING [[id number]   Attending Physician: SANDRA EWING [[id number]]   Bed Request Comments: pcu               No follow-up provider specified.       Medication List      No changes were made to your prescriptions during this visit.          Mark Camara MD  01/14/23 1416

## 2023-01-15 NOTE — H&P
[facility] Medicine Services  History & Physical    Patient Name: Catalina Moreno  : 1940  MRN: [id number]  Primary Care Physician:  Rubio Dover MD  Date of admission: 2023  Date and Time of Service: 2023 at 2200    Subjective      Chief Complaint: Short of breath, generalized weakness, heart racing    History of Present Illness: Catalina Moreno is a 82 y.o. female with a past medical history of atrial fibrillation, systolic CHF, pulmonary hypertension, primary hypertension, restless leg syndrome, depression, and hypothyroidism who presented to [facility] on 2023 complaining of her heart racing, shortness of breath, and generalized weakness.  She reports that this has been occurring for approximately 2 days and she also states that she has had no appetite and a dry cough.  She denies any chest pain and has no other complaints.    In the ED, EKG showed atrial fibrillation with RVR, .  Troponin is negative, proBNP 9487, digoxin 0.40.  All other labs are unremarkable.  She is afebrile, vital signs are stable other than her tachycardia.  Chest x-ray showed stable chronic findings with no acute abnormality.  She was given 1 dose of digoxin, 125 mcg and started on a Cardizem drip.  Hospitalist was consulted for further care and management.    On chart review, the patient was discharged on 1/10/2023 after being admitted for atrial fibrillation with RVR.  On that admission her metoprolol succinate was increased to 100 mg daily.  Of note, she refused inpatient rehab and went home with self-care due to having a pet.    Review of Systems   Constitutional: Positive for malaise/fatigue.   HENT: Negative.    Eyes: Negative.    Cardiovascular: Positive for irregular heartbeat and palpitations.   Respiratory: Positive for cough (dry) and shortness of breath.    Hematologic/Lymphatic: Negative.    Skin: Negative.    Musculoskeletal: Negative.    Gastrointestinal:  Negative.    Genitourinary: Negative.    Neurological: Negative.    Psychiatric/Behavioral: Negative.    Allergic/Immunologic: Negative.        Personal History     Past Medical History:   Diagnosis Date   • Acute kidney injury (HCC) 7/22/2022   • Anxiety    • Arthritis    • Atrial fibrillation (HCC)     paroxysmal   • Broken shoulder     left-- due to fall 11-7-19 was at [facility]   • Buttock pain     rt side   • DDD (degenerative disc disease), lumbar    • Depression    • Edema     9/2020 foot   • GERD (gastroesophageal reflux disease)    • H/O fall    • Hip pain     rt   • Hypertension    • Hypothyroidism    • Insomnia    • Irregular heart beat    • Leg pain     rt   • Low back pain    • Neuropathy    • Pain in both feet    • PONV (postoperative nausea and vomiting)    • Pulmonary hypertension (HCC)    • Radiculopathy    • Restless legs    • Spondylolisthesis    • Urinary incontinence        Past Surgical History:   Procedure Laterality Date   • BACK SURGERY  07/19/2018    PDC &  PSF L3-L5 insitu   • CARDIAC CATHETERIZATION N/A 4/2/2021    Procedure: Cardiac Catheterization/Vascular Study;  Surgeon: Barrett Evans MD;  Location: [facility] INVASIVE LOCATION;  Service: Cardiovascular;  Laterality: N/A;   • CHOLECYSTECTOMY     • COLONOSCOPY     • HYSTERECTOMY     • ROTATOR CUFF REPAIR Left        Family History: family history includes Cancer in her father, paternal aunt, and paternal uncle; Diabetes in her son; Heart disease in her paternal aunt. Otherwise pertinent FHx was reviewed and not pertinent to current issue.    Social History:  reports that she has never smoked. She has never used smokeless tobacco. She reports that she does not drink alcohol and does not use drugs.    Home Medications:  Prior to Admission Medications     Prescriptions Last Dose Informant Patient Reported? Taking?    apixaban (ELIQUIS) 5 MG tablet tablet   No No    Take 1 tablet by mouth Every 12 (Twelve) Hours. Indications:  Atrial Fibrillation    atorvastatin (LIPITOR) 10 MG tablet   No No    Take 1 tablet by mouth Every Night.    digoxin (LANOXIN) 125 MCG tablet   No No    Take 1 tablet by mouth Daily.    famotidine (PEPCID) 20 MG tablet   Yes No    Take 20 mg by mouth Daily. Indications: Heartburn    gabapentin (NEURONTIN) 600 MG tablet   No No    TAKE 1 TABLET BY MOUTH FOUR TIMES DAILY    HYDROcodone-acetaminophen (NORCO) 7.5-325 MG per tablet   No No    Take 1 tablet by mouth 4 (Four) Times a Day As Needed for Severe Pain. DNF before 11/2/2022    levothyroxine (SYNTHROID, LEVOTHROID) 88 MCG tablet   Yes No    Take 88 mcg by mouth Daily. Indications: Underactive Thyroid    lidocaine (LIDODERM) 5 %   No No    UNWRAP AND APPLY 2 PATCH(ES) ON THE SKIN DAILY AS DIRECTED BY PROVIDER. REMOVE AND DISCARD PATCH(ES) WITHIN 12 HOURS OR AS DIRECTED BY MD    loperamide (IMODIUM A-D) 2 MG tablet   Yes No    Take 2 mg by mouth 4 (Four) Times a Day As Needed for Diarrhea. Indications: Diarrhea    metoprolol succinate XL (TOPROL-XL) 100 MG 24 hr tablet   No No    Take 1 tablet by mouth Daily. Indications: High Blood Pressure Disorder    pantoprazole (PROTONIX) 40 MG EC tablet   Yes No    Take 40 mg by mouth Daily With Lunch. Afternoons  Indications: Gastroesophageal Reflux Disease    rOPINIRole (REQUIP) 0.25 MG tablet   No No    Take 1 tablet by mouth Every Night. Take 1 hour before bedtime    sertraline (ZOLOFT) 50 MG tablet   Yes No    Take 50 mg by mouth Every Night. Indications: Major Depressive Disorder    sildenafil (REVATIO) 20 MG tablet   Yes No    Indications: Pulmonary Arterial Hypertension    tiZANidine (ZANAFLEX) 4 MG tablet   Yes No    Take 4 mg by mouth 2 (Two) Times a Day As Needed. Indications: Musculoskeletal Pain    vitamin B-12 (VITAMIN B-12) 1000 MCG tablet   No No    Take 1 tablet by mouth Daily.            Allergies:  Allergies   Allergen Reactions   • Baclofen Rash   • Codeine Nausea Only   • Ibuprofen Unknown (See Comments)      Patient doesn't know----Motin   • Methocarbamol Unknown (See Comments)     Patient doesn't know   • Naproxen Unknown (See Comments)     Pt. Doesn't know    • Tizanidine Hcl Other (See Comments)     Syncope    • Tolmetin Dizziness     Pt. Doesn't know-- same as Tolectin       Objective      Vitals:   Temp:  [98.7 °F (37.1 °C)] 98.7 °F (37.1 °C)  Heart Rate:  [] 103  Resp:  [18] 18  BP: (127-185)/() 165/82    Physical Exam  Vitals and nursing note reviewed.   Constitutional:       Appearance: Normal appearance. She is obese.   HENT:      Head: Normocephalic and atraumatic.      Nose: Nose normal.      Mouth/Throat:      Mouth: Mucous membranes are moist.   Eyes:      Extraocular Movements: Extraocular movements intact.      Pupils: Pupils are equal, round, and reactive to light.   Cardiovascular:      Rate and Rhythm: Normal rate. Rhythm irregularly irregular.      Pulses: Normal pulses.      Heart sounds: Normal heart sounds.      Comments: Cardizem infusing, HR 84  Pulmonary:      Effort: Pulmonary effort is normal.      Breath sounds: Normal breath sounds.   Abdominal:      General: Bowel sounds are normal.      Palpations: Abdomen is soft.   Musculoskeletal:         General: Normal range of motion.      Cervical back: Normal range of motion.   Skin:     General: Skin is warm and dry.   Neurological:      General: No focal deficit present.      Mental Status: She is alert and oriented to person, place, and time. Mental status is at baseline.   Psychiatric:         Mood and Affect: Mood is anxious.         Behavior: Behavior normal. Behavior is cooperative.        Result Review    Result Review:  I have personally reviewed the results from the time of this admission to 1/14/2023 22:13 EST and agree with these findings:  [x]  Laboratory  []  Microbiology  [x]  Radiology  []  EKG/Telemetry   []  Cardiology/Vascular   []  Pathology  []  Old records  []  Other:  Most notable findings include: as  above      Assessment & Plan        Active Hospital Problems:  Active Hospital Problems    Diagnosis    • **Atrial fibrillation with RVR (HCC)    • Restless legs syndrome    • Major depressive disorder, single episode, unspecified    • Generalized anxiety disorder    • Pulmonary hypertension, unspecified (HCC)    • Chronic low back pain    • Neuropathic pain    • Essential hypertension    • Depressive disorder    • Hypothyroidism      Plan:     Atrial fibrillation with RVR  -EKG reviewed  -Digoxin level 0.4, digoxin 125 mcg IV given in ED  -Cardizem bolus and Cardizem drip started in ED, continue  -Continuous cardiac monitoring  -Continue home digoxin, Eliquis  -Consult cardiology     Chronic systolic heart failure  Pulmonary hypertension   -EF 36 to 40% on 1/5/2023  -Continue home sildenafil    Essential Hypertension  -Controlled  -Monitor BP  -Continue home metoprolol succinate    Hyperlipidemia  -Continue home atorvastatin     Chronic pain  -Stable  -Continue home Neurontin, Zanaflex, Norco (inspect verified)  -Continue home Lidoderm patch     Depression  -Chronic, stable  -Continue home Zoloft      Hypothyroidism  -Continue home levothyroxine    Restless leg syndrome  -Continue home Requip    DVT prophylaxis:  Medical DVT prophylaxis orders are present.    CODE STATUS:    Code Status (Patient has no pulse and is not breathing): CPR (Attempt to Resuscitate)  Medical Interventions (Patient has pulse or is breathing): Full Support    Admission Status:  I believe this patient meets inpatient status.    I discussed the patient's findings and my recommendations with patient.    This patient has been examined wearing appropriate Personal Protective Equipment  01/14/23      Signature: Electronically signed by Noemy Mead DNP, APRN, 01/14/23, 22:13 EST.  [facility] Hospitalist Team

## 2023-01-15 NOTE — OUTREACH NOTE
CHF Week 1 Survey    Flowsheet Row Responses   [facility] facility patient discharged from? [facility]   Does the patient have one of the following disease processes/diagnoses(primary or secondary)? CHF   CHF Week 1 attempt successful? No   Unsuccessful attempts Attempt 2   Revoke Readmitted          JACY WHITTEN - Registered Nurse

## 2023-01-16 LAB
ANION GAP SERPL CALCULATED.3IONS-SCNC: 8 MMOL/L (ref 5–15)
BASOPHILS # BLD AUTO: 0.1 10*3/MM3 (ref 0–0.2)
BASOPHILS NFR BLD AUTO: 1.1 % (ref 0–1.5)
BUN SERPL-MCNC: 21 MG/DL (ref 8–23)
BUN/CREAT SERPL: 22.6 (ref 7–25)
CALCIUM SPEC-SCNC: 8.1 MG/DL (ref 8.6–10.5)
CHLORIDE SERPL-SCNC: 103 MMOL/L (ref 98–107)
CO2 SERPL-SCNC: 26 MMOL/L (ref 22–29)
CREAT SERPL-MCNC: 0.93 MG/DL (ref 0.57–1)
DEPRECATED RDW RBC AUTO: 54.3 FL (ref 37–54)
EGFRCR SERPLBLD CKD-EPI 2021: 61.9 ML/MIN/1.73
EOSINOPHIL # BLD AUTO: 0.2 10*3/MM3 (ref 0–0.4)
EOSINOPHIL NFR BLD AUTO: 4.4 % (ref 0.3–6.2)
ERYTHROCYTE [DISTWIDTH] IN BLOOD BY AUTOMATED COUNT: 17.3 % (ref 12.3–15.4)
GLUCOSE SERPL-MCNC: 97 MG/DL (ref 65–99)
HCT VFR BLD AUTO: 30.6 % (ref 34–46.6)
HGB BLD-MCNC: 9.8 G/DL (ref 12–15.9)
LYMPHOCYTES # BLD AUTO: 1.7 10*3/MM3 (ref 0.7–3.1)
LYMPHOCYTES NFR BLD AUTO: 30.8 % (ref 19.6–45.3)
MAGNESIUM SERPL-MCNC: 3.1 MG/DL (ref 1.6–2.4)
MCH RBC QN AUTO: 26.8 PG (ref 26.6–33)
MCHC RBC AUTO-ENTMCNC: 32.1 G/DL (ref 31.5–35.7)
MCV RBC AUTO: 83.6 FL (ref 79–97)
MONOCYTES # BLD AUTO: 0.8 10*3/MM3 (ref 0.1–0.9)
MONOCYTES NFR BLD AUTO: 14.5 % (ref 5–12)
NEUTROPHILS NFR BLD AUTO: 2.7 10*3/MM3 (ref 1.7–7)
NEUTROPHILS NFR BLD AUTO: 49.2 % (ref 42.7–76)
NRBC BLD AUTO-RTO: 0.2 /100 WBC (ref 0–0.2)
PLATELET # BLD AUTO: 199 10*3/MM3 (ref 140–450)
PMV BLD AUTO: 9.1 FL (ref 6–12)
POTASSIUM SERPL-SCNC: 4.3 MMOL/L (ref 3.5–5.2)
QT INTERVAL: 375 MS
QT INTERVAL: 557 MS
RBC # BLD AUTO: 3.66 10*6/MM3 (ref 3.77–5.28)
SODIUM SERPL-SCNC: 137 MMOL/L (ref 136–145)
WBC NRBC COR # BLD: 5.4 10*3/MM3 (ref 3.4–10.8)

## 2023-01-16 PROCEDURE — 93010 ELECTROCARDIOGRAM REPORT: CPT | Performed by: INTERNAL MEDICINE

## 2023-01-16 PROCEDURE — 99233 SBSQ HOSP IP/OBS HIGH 50: CPT | Performed by: INTERNAL MEDICINE

## 2023-01-16 PROCEDURE — 93005 ELECTROCARDIOGRAM TRACING: CPT | Performed by: INTERNAL MEDICINE

## 2023-01-16 PROCEDURE — 93005 ELECTROCARDIOGRAM TRACING: CPT | Performed by: NURSE PRACTITIONER

## 2023-01-16 PROCEDURE — 85025 COMPLETE CBC W/AUTO DIFF WBC: CPT

## 2023-01-16 PROCEDURE — 80048 BASIC METABOLIC PNL TOTAL CA: CPT

## 2023-01-16 PROCEDURE — 25010000002 ONDANSETRON PER 1 MG: Performed by: INTERNAL MEDICINE

## 2023-01-16 PROCEDURE — 83735 ASSAY OF MAGNESIUM: CPT | Performed by: INTERNAL MEDICINE

## 2023-01-16 RX ORDER — LOPERAMIDE HYDROCHLORIDE 2 MG/1
2 CAPSULE ORAL 4 TIMES DAILY PRN
Status: DISCONTINUED | OUTPATIENT
Start: 2023-01-16 | End: 2023-01-20 | Stop reason: HOSPADM

## 2023-01-16 RX ORDER — ONDANSETRON 2 MG/ML
4 INJECTION INTRAMUSCULAR; INTRAVENOUS EVERY 6 HOURS PRN
Status: DISCONTINUED | OUTPATIENT
Start: 2023-01-16 | End: 2023-01-20 | Stop reason: HOSPADM

## 2023-01-16 RX ORDER — DOFETILIDE 0.25 MG/1
250 CAPSULE ORAL EVERY 12 HOURS SCHEDULED
Status: DISCONTINUED | OUTPATIENT
Start: 2023-01-16 | End: 2023-01-16

## 2023-01-16 RX ADMIN — GABAPENTIN 600 MG: 600 TABLET, FILM COATED ORAL at 09:25

## 2023-01-16 RX ADMIN — GABAPENTIN 600 MG: 600 TABLET, FILM COATED ORAL at 16:23

## 2023-01-16 RX ADMIN — SERTRALINE 50 MG: 50 TABLET, FILM COATED ORAL at 21:17

## 2023-01-16 RX ADMIN — Medication 10 ML: at 09:25

## 2023-01-16 RX ADMIN — GABAPENTIN 600 MG: 600 TABLET, FILM COATED ORAL at 21:17

## 2023-01-16 RX ADMIN — SILDENAFIL CITRATE 20 MG: 20 TABLET ORAL at 06:00

## 2023-01-16 RX ADMIN — GABAPENTIN 600 MG: 600 TABLET, FILM COATED ORAL at 12:18

## 2023-01-16 RX ADMIN — SENNOSIDES AND DOCUSATE SODIUM 2 TABLET: 50; 8.6 TABLET ORAL at 21:18

## 2023-01-16 RX ADMIN — Medication 10 ML: at 21:18

## 2023-01-16 RX ADMIN — PANTOPRAZOLE SODIUM 40 MG: 40 TABLET, DELAYED RELEASE ORAL at 12:18

## 2023-01-16 RX ADMIN — ROPINIROLE HYDROCHLORIDE 0.25 MG: 0.25 TABLET, FILM COATED ORAL at 21:18

## 2023-01-16 RX ADMIN — ONDANSETRON 4 MG: 2 INJECTION INTRAMUSCULAR; INTRAVENOUS at 09:39

## 2023-01-16 RX ADMIN — LEVOTHYROXINE SODIUM 88 MCG: 0.09 TABLET ORAL at 06:00

## 2023-01-16 RX ADMIN — ATORVASTATIN CALCIUM 10 MG: 20 TABLET, FILM COATED ORAL at 21:18

## 2023-01-16 RX ADMIN — SODIUM CHLORIDE 500 ML: 9 INJECTION, SOLUTION INTRAVENOUS at 12:42

## 2023-01-16 RX ADMIN — HYDROCODONE BITARTRATE AND ACETAMINOPHEN 1 TABLET: 7.5; 325 TABLET ORAL at 16:23

## 2023-01-16 RX ADMIN — HYDROCODONE BITARTRATE AND ACETAMINOPHEN 1 TABLET: 7.5; 325 TABLET ORAL at 21:50

## 2023-01-16 RX ADMIN — DOFETILIDE 250 MCG: 0.25 CAPSULE ORAL at 06:59

## 2023-01-16 RX ADMIN — SILDENAFIL CITRATE 20 MG: 20 TABLET ORAL at 21:18

## 2023-01-16 RX ADMIN — APIXABAN 5 MG: 5 TABLET, FILM COATED ORAL at 09:25

## 2023-01-16 NOTE — NURSING NOTE
Informed pt son gilma of plans for pacemaker tomorrow per pt request. Will  Update son again tomorrow after pacemaker is placed

## 2023-01-16 NOTE — SIGNIFICANT NOTE
01/16/23 1113   OTHER   Discipline physical therapist   Rehab Time/Intention   Session Not Performed unable to evaluate, medical status change;other (see comments)  (Pt bradycardic nursing requested hold this date.)   Recommendation   PT - Next Appointment 01/17/23

## 2023-01-16 NOTE — PROGRESS NOTES
[facility]     Progress Note    Patient Name: Catalina Moreno  : 1940  MRN: [id number]  Primary Care Physician:  Rubio Dover MD  Date of admission: 2023    Subjective   Subjective     Chief Complaint: Palpitations, weakness    History of Present Illness  Continues to report weakness and nausea.  Still reporting poor p.o. intake.  P.o. intake encouraged.  Unfortunately patient with some significant bradycardia and pauses on telemetry.  Plan for pacemaker placement    Review of Systems  Negative excepts as mentioned above    Objective   Objective     Vitals:   Temp:  [97.3 °F (36.3 °C)-97.8 °F (36.6 °C)] 97.6 °F (36.4 °C)  Heart Rate:  [] 63  Resp:  [12-20] 20  BP: ()/(44-79) 103/51  Flow (L/min):  [1-2] 1    Physical Exam  Constitutional:       General: She is not in acute distress.     Appearance: Normal appearance. She is not ill-appearing.   HENT:      Mouth/Throat:      Mouth: Mucous membranes are dry.   Eyes:      Extraocular Movements: Extraocular movements intact.      Pupils: Pupils are equal, round, and reactive to light.   Cardiovascular:      Rate and Rhythm: Normal rate. Rhythm irregular.   Pulmonary:      Effort: Pulmonary effort is normal. No respiratory distress.      Breath sounds: No wheezing or rales.   Abdominal:      General: There is no distension.      Palpations: Abdomen is soft.      Tenderness: There is no abdominal tenderness.   Neurological:      General: No focal deficit present.      Mental Status: She is alert and oriented to person, place, and time.          Result Review    Result Review:  I have personally reviewed the results from the time of this admission to 2023 16:15 EST and agree with these findings:  [x]  Laboratory list / accordion  []  Microbiology  []  Radiology  []  EKG/Telemetry   []  Cardiology/Vascular   []  Pathology  []  Old records  []  Other:  Most notable findings include:     Assessment & Plan   Assessment / Plan      Brief Patient Summary:  Catalina Moreno is a 82 y.o. female who is admitted for atrial fibrillation with RVR    Active Hospital Problems:  Active Hospital Problems    Diagnosis    • **Atrial fibrillation with RVR ([facility])    • Class 1 obesity due to excess calories with serious comorbidity and body mass index (BMI) of 30.0 to 30.9 in adult    • Hypotension    • Moderate mitral regurgitation    • Chronic systolic CHF (congestive heart failure) ([facility])    • Restless legs syndrome    • Generalized anxiety disorder    • Pulmonary hypertension, unspecified ([facility])    • Paroxysmal atrial fibrillation ([facility])    • Essential (primary) hypertension    • Polyneuropathy, unspecified    • Coronary artery disease involving native coronary artery of native heart without angina pectoris    • Stage 3a chronic kidney disease ([facility])    • Chronic low back pain    • Depressive disorder    • Hypothyroidism      Plan:     Atrial fibrillation with RVR  -EKG reviewed  -Digoxin has been discontinued  -Toprol has been discontinued  -Patient was trialed on Tikosyn by cardiology unfortunately patient had some bradycardia with pauses, this has also been discontinued  -Plan for PPM placement  -Continuous cardiac monitoring  -Continue Eliquis  -Cardiology recommendation     Chronic systolic heart failure  Pulmonary hypertension   -EF 36 to 40% on 1/5/2023  -Continue home sildenafil     Essential Hypertension  -Controlled  -Monitor BP  -Continue home metoprolol succinate     Hyperlipidemia  -Continue home atorvastatin     Chronic pain  -Stable  -Continue home Neurontin, Zanaflex, Norco (inspect verified)  -Continue home Lidoderm patch     Depression  -Chronic, stable  -Continue home Zoloft      Hypothyroidism  -Continue home levothyroxine     Restless leg syndrome  -Continue home Requip    DVT prophylaxis:  No DVT prophylaxis order currently exists.    CODE STATUS:    Code Status (Patient has no pulse and is not breathing): CPR (Attempt to  Resuscitate)  Medical Interventions (Patient has pulse or is breathing): Full Support    Disposition:  I expect patient to be discharged early next week.    Zack Cifuentes MD

## 2023-01-16 NOTE — PROGRESS NOTES
Referring Provider: Hospitalist    Reason for follow-up: Atrial fibrillation     Patient Care Team:  Rubio Dover MD as PCP - General (Family Medicine)  Flash Gonzalez MD as Consulting Physician (Cardiology)    Subjective .  Patient had some nausea this morning    Objective  Lying in bed comfortably     Review of Systems   Constitutional: Negative for fever and malaise/fatigue.   HENT: Negative for ear pain and nosebleeds.    Eyes: Negative for blurred vision and double vision.   Cardiovascular: Negative for chest pain, dyspnea on exertion and palpitations.   Respiratory: Negative for cough and shortness of breath.    Skin: Negative for rash.   Musculoskeletal: Negative for joint pain.   Gastrointestinal: Positive for nausea. Negative for abdominal pain and vomiting.   Neurological: Negative for focal weakness and headaches.   Psychiatric/Behavioral: Negative for depression. The patient is not nervous/anxious.    All other systems reviewed and are negative.      Baclofen, Codeine, Ibuprofen, Methocarbamol, Naproxen, Tizanidine hcl, and Tolmetin    Scheduled Meds:atorvastatin, 10 mg, Oral, Nightly  gabapentin, 600 mg, Oral, 4x Daily  levothyroxine, 88 mcg, Oral, Q AM  lidocaine, 2 patch, Transdermal, Q24H  pantoprazole, 40 mg, Oral, Daily With Lunch  rOPINIRole, 0.25 mg, Oral, Nightly  senna-docusate sodium, 2 tablet, Oral, BID  sertraline, 50 mg, Oral, Nightly  sildenafil, 20 mg, Oral, Q8H  sodium chloride, 10 mL, Intravenous, Q12H      Continuous Infusions:   PRN Meds:.•  acetaminophen **OR** acetaminophen **OR** acetaminophen  •  senna-docusate sodium **AND** polyethylene glycol **AND** bisacodyl **AND** bisacodyl  •  guaifenesin  •  HYDROcodone-acetaminophen  •  loperamide  •  magnesium sulfate **OR** magnesium sulfate **OR** magnesium sulfate  •  melatonin  •  ondansetron  •  Pharmacy Consult  •  potassium chloride **OR** potassium chloride  •  [COMPLETED] Insert Peripheral IV **AND** sodium  "chloride  •  sodium chloride  •  sodium chloride  •  tiZANidine        VITAL SIGNS  Vitals:    01/16/23 0600 01/16/23 0603 01/16/23 1000 01/16/23 1219   BP: 112/69 112/69 107/55    BP Location:  Left arm Left arm    Patient Position:  Lying Lying    Pulse: 102 91 (!) 48 59   Resp:  12 19    Temp:  97.8 °F (36.6 °C) 97.3 °F (36.3 °C)    TempSrc:  Oral Oral    SpO2:  97% 95%    Weight:  73.5 kg (162 lb 0.6 oz)     Height:           Flowsheet Rows    Flowsheet Row First Filed Value   Admission Height 160 cm (63\") Documented at 01/14/2023 1927   Admission Weight 79.4 kg (175 lb) Documented at 01/14/2023 1927           TELEMETRY: Sinus bradycardia    Physical Exam:  Constitutional:       Appearance: Well-developed.   Eyes:      General: No scleral icterus.     Conjunctiva/sclera: Conjunctivae normal.      Pupils: Pupils are equal, round, and reactive to light.   HENT:      Head: Normocephalic and atraumatic.   Neck:      Vascular: No carotid bruit or JVD.   Pulmonary:      Effort: Pulmonary effort is normal.      Breath sounds: Normal breath sounds. No wheezing. No rales.   Cardiovascular:      Normal rate. Regular rhythm.   Pulses:     Intact distal pulses.   Abdominal:      General: Bowel sounds are normal.      Palpations: Abdomen is soft.   Musculoskeletal: Normal range of motion.      Cervical back: Normal range of motion and neck supple. Skin:     General: Skin is warm and dry.      Findings: No rash.   Neurological:      Mental Status: Alert.      Comments: No focal deficits          Results Review:   I reviewed the patient's new clinical results.  Lab Results (last 24 hours)     Procedure Component Value Units Date/Time    Magnesium [[id number]]  (Abnormal) Collected: 01/16/23 0057    Specimen: Blood Updated: 01/16/23 0216     Magnesium 3.1 mg/dL     Basic Metabolic Panel [[id number]]  (Abnormal) Collected: 01/16/23 0057    Specimen: Blood Updated: 01/16/23 0214     Glucose 97 mg/dL      BUN 21 mg/dL      " Creatinine 0.93 mg/dL      Sodium 137 mmol/L      Potassium 4.3 mmol/L      Chloride 103 mmol/L      CO2 26.0 mmol/L      Calcium 8.1 mg/dL      BUN/Creatinine Ratio 22.6     Anion Gap 8.0 mmol/L      eGFR 61.9 mL/min/1.73      Comment: National Kidney Foundation and American Society of Nephrology (ASN) Task Force recommended calculation based on the Chronic Kidney Disease Epidemiology Collaboration (CKD-EPI) equation refit without adjustment for race.       Narrative:      GFR Normal >60  Chronic Kidney Disease <60  Kidney Failure <15    The GFR formula is only valid for adults with stable renal function between ages 18 and 70.    CBC & Differential [[id number]]  (Abnormal) Collected: 01/16/23 0057    Specimen: Blood Updated: 01/16/23 0149    Narrative:      The following orders were created for panel order CBC & Differential.  Procedure                               Abnormality         Status                     ---------                               -----------         ------                     CBC Auto Differential[[id number]]        Abnormal            Final result                 Please view results for these tests on the individual orders.    CBC Auto Differential [[id number]]  (Abnormal) Collected: 01/16/23 0057    Specimen: Blood Updated: 01/16/23 0149     WBC 5.40 10*3/mm3      RBC 3.66 10*6/mm3      Hemoglobin 9.8 g/dL      Hematocrit 30.6 %      MCV 83.6 fL      MCH 26.8 pg      MCHC 32.1 g/dL      RDW 17.3 %      RDW-SD 54.3 fl      MPV 9.1 fL      Platelets 199 10*3/mm3      Neutrophil % 49.2 %      Lymphocyte % 30.8 %      Monocyte % 14.5 %      Eosinophil % 4.4 %      Basophil % 1.1 %      Neutrophils, Absolute 2.70 10*3/mm3      Lymphocytes, Absolute 1.70 10*3/mm3      Monocytes, Absolute 0.80 10*3/mm3      Eosinophils, Absolute 0.20 10*3/mm3      Basophils, Absolute 0.10 10*3/mm3      nRBC 0.2 /100 WBC     Extra Tubes [[id number]] Collected: 01/15/23 1501    Specimen: Blood, Venous Line Updated:  01/15/23 1616    Narrative:      The following orders were created for panel order Extra Tubes.  Procedure                               Abnormality         Status                     ---------                               -----------         ------                     Lavender Top[[id number]]                                     Final result                 Please view results for these tests on the individual orders.    Lavender Top [[id number]] Collected: 01/15/23 1503    Specimen: Blood Updated: 01/15/23 1616     Extra Tube hold for add-on     Comment: Auto resulted       Basic Metabolic Panel [[id number]]  (Abnormal) Collected: 01/15/23 1503    Specimen: Blood Updated: 01/15/23 1532     Glucose 131 mg/dL      BUN 18 mg/dL      Creatinine 0.85 mg/dL      Sodium 138 mmol/L      Potassium 3.7 mmol/L      Chloride 101 mmol/L      CO2 26.0 mmol/L      Calcium 8.7 mg/dL      BUN/Creatinine Ratio 21.2     Anion Gap 11.0 mmol/L      eGFR 68.9 mL/min/1.73      Comment: National Kidney Foundation and American Society of Nephrology (ASN) Task Force recommended calculation based on the Chronic Kidney Disease Epidemiology Collaboration (CKD-EPI) equation refit without adjustment for race.       Narrative:      GFR Normal >60  Chronic Kidney Disease <60  Kidney Failure <15    The GFR formula is only valid for adults with stable renal function between ages 18 and 70.    Hemoglobin & Hematocrit, Blood [[id number]]  (Abnormal) Collected: 01/15/23 1453    Specimen: Blood Updated: 01/15/23 1513     Hemoglobin 10.4 g/dL      Hematocrit 33.8 %     Magnesium [[id number]]  (Normal) Collected: 01/15/23 1058    Specimen: Blood Updated: 01/15/23 1506     Magnesium 1.9 mg/dL     Troponin [[id number]]  (Normal) Collected: 01/15/23 1058    Specimen: Blood Updated: 01/15/23 1423     Troponin T <0.010 ng/mL     Narrative:      Troponin T Reference Range:  <= 0.03 ng/mL-   Negative for AMI  >0.03 ng/mL-     Abnormal for myocardial necrosis.   Clinicians would have to utilize clinical acumen, EKG, Troponin and serial changes to determine if it is an Acute Myocardial Infarction or myocardial injury due to an underlying chronic condition.       Results may be falsely decreased if patient taking Biotin.            Imaging Results (Last 24 Hours)     ** No results found for the last 24 hours. **          EKG      I personally viewed and interpreted the patient's EKG/Telemetry data:    ECHOCARDIOGRAM:    STRESS MYOVIEW:    CARDIAC CATHETERIZATION:    OTHER:         Assessment & Plan     Principal Problem:    Atrial fibrillation with RVR ([facility])  Active Problems:    Depressive disorder    Hypothyroidism    Stage 3a chronic kidney disease (HCC)    Chronic low back pain    Generalized anxiety disorder    Polyneuropathy, unspecified    Restless legs syndrome    Paroxysmal atrial fibrillation (HCC)    Essential (primary) hypertension    Pulmonary hypertension, unspecified ([facility])    Chronic systolic CHF (congestive heart failure) ([facility])    Coronary artery disease involving native coronary artery of native heart without angina pectoris    Moderate mitral regurgitation    Class 1 obesity due to excess calories with serious comorbidity and body mass index (BMI) of 30.0 to 30.9 in adult    Hypotension       Patient presented with the palpitations and shortness of breath and had atrial fibrillation with rapid response  Patient was seen yesterday by my colleague and was placed on Tikosyn  Patient converted to sinus rhythm significant bradycardia and asymptomatic.  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    I discussed the patients findings and my recommendations with patient and nurse    Flash Gonzalez MD  01/16/23  12:20 EST

## 2023-01-16 NOTE — PHARMACY RECOMMENDATION
"Transitions-of-Care ([facility]BAR) Pharmacy Future COST Assessment:     /Nurse requesting test claim: [facility]    Pharmacy ran test claim for the following:     Drug Sig Covered/PA required Patient Copay per month   Tikosyn (dofetilide) 250 mcg BID Covered without PA $ 4.80 per month     Patient Insurance Type: Medicare - this means they are NOT eligible for a monthly discount card in the future (see \"free month\" note below)    Deductible/Medicare Gap Issue? Unknown    Is patient signed up for M2B service? No    Is above drug(s) eligible for 1 month free through M2B service? No - coupon not available for this drug  If eligible,  or [facility] pharmacy can provide coupon upon request.             For questions, reach out to Retail Pharmacy at [phone]    Marti Fajardo, PharmD   1/16/2023 10:53 EST  "

## 2023-01-16 NOTE — CASE MANAGEMENT/SOCIAL WORK
Continued Stay Note  [facility]     Patient Name: Catalina Moreno  MRN: [id number]  Today's Date: 1/16/2023    Admit Date: 1/14/2023    Plan: DC Plan: Declined SNF. Anticipate routine home with [facility] (current, [facility] order per MD). Watch O2 needs. Watch for possible transport needs at d/c. Tikosyn cost $4.80/month.   Discharge Plan     Row Name 01/16/23 1324       Plan    Plan DC Plan: Declined SNF. Anticipate routine home with [facility] (current, [facility] order per MD). Watch O2 needs. Watch for possible transport needs at d/c. Tikosyn cost $4.80/month.    Patient/Family in Agreement with Plan yes    Plan Comments CM contacted [facility] to test claim cost of Tikosyn. Cost covered without PA for $4.80 per month. CM met with patient at bedside to notify her and discuss readmission assessment. Patient declined SNF and reported that she has a dog and no one available to take care of it if she went to rehab. Reported that her son, Jordan lives a while away and is a  so he is not always around. Reported that there must have been a mixup because someone told her son that she refused HH. She reported that she was agreeable to HH. CM added in [facility] basket and contacted liaison to inquire if patient had been seen by HH after last hospitalization discharge (1/10/23).              Case Management Readmission Assessment Note    Case Management Readmission Assessment (all recorded)     Readmission Interview     Row Name 01/16/23 3712             Readmission Indications    Is this hospitalization related to the prior hospital diagnosis? Yes      What was the reason you were admitted? Atrial fibrillation with RVR. Patient reported that she came to ER because of reported nausea and diarrhea.      Row Name 01/16/23 1317             Recommendation for rehospitalization    Did you speak with your physician prior to coming to the hospital No      Did you seek care elsewhere prior to coming to the hospital? No       Row Name 01/16/23 1314             Follow up appointment    Do you have a PCP? Yes  MattieAnton      Did you have an appointment with PCP/specialist after hospitalization within 7 days? No      [facility] Name 01/16/23 1314             Medications    Did you have newly prescribed medications at discharge? No      Are you taking all of you prescribed medications? No      If not, why? Could not afford  Patient reported that she used to be able to afford her Eliquis when it was $47 but the last time she went to pick it up it cost over $100.      Row Name 01/16/23 1314             Discharge Instructions    Were you given a number of someone to call if you had questions or concerns? Yes      [facility] Name 01/16/23 1314             Index discharge location/services    Where did you go upon discharge? Home with services      Do you have supportive family or friends in the home? No      What services were arranged at discharge? [facility] 01/16/23 1314             Discharge Readiness    On a scale of 1-5 (5 being well prepared), how ready were you for discharge 2  Patient reported that she did not feel ready to leave the hospital because she was not strong enough.      Recommendation based on interview Education on diagnosis/self management;Additional caregiver support;Transportation assistance;Goals of care discussion/advanced care planning;Financial assistance;Other (comment)  Patient previously declined SNF because she has a dog that she needed to get home to. Reported that her son was watching the dog temporarily but he lives away and is a  so he cannot always be there for the dog.              LACE: 15    Met with patient in room wearing PPE: mask.      Maintained distance greater than six feet and spent less than 15 minutes in the room.      Kathy Celeste RN     Office Phone: [phone]  Office Cell: [phone]

## 2023-01-16 NOTE — DISCHARGE PLACEMENT REQUEST
"Catalina Moreno (82 y.o. Female)     Date of Birth   1940    Social Security Number       Address   [address] [place] [address] [place] [facility]    Home Phone   [phone]    MRN   [id number]       Jehovah's witness   None    Marital Status                               Admission Date   1/14/23    Admission Type   Emergency    Admitting Provider   Lyla Costello MD    Attending Provider   Zack Cifuentes MD    Department, Room/Bed   [facility], 2118/1       Discharge Date       Discharge Disposition       Discharge Destination                               Attending Provider: Zack Cifuentes MD    Allergies: Baclofen, Codeine, Ibuprofen, Methocarbamol, Naproxen, Tizanidine Hcl, Tolmetin    Isolation: None   Infection: None   Code Status: CPR    Ht: 160 cm (63\")   Wt: 73.5 kg (162 lb 0.6 oz)    Admission Cmt: None   Principal Problem: Atrial fibrillation with RVR (HCC) [I48.91]                 Active Insurance as of 1/14/2023     Primary Coverage     Payor Plan Insurance Group Employer/Plan Group    MEDICARE MEDICARE A & B      Payor Plan Address Payor Plan Phone Number Payor Plan Fax Number Effective Dates    [address] [phone]  4/1/2006 - None Entered    [facility] [id number]       Subscriber Name Subscriber Birth Date Member ID       CATALINA MORENO 1940 [id number]           Secondary Coverage     Payor Plan Insurance Group Employer/Plan Group    ANTHEM BLUE CROSS ANTHEM BLUE CROSS BLUE SHIELD PPO [id number]     Payor Plan Address Payor Plan Phone Number Payor Plan Fax Number Effective Dates    [address] [phone]  2/2/2022 - None Entered    [facility] [address]       Subscriber Name Subscriber Birth Date Member ID       CATALINA MORENO 1940 [id number]           Tertiary Coverage     Payor Plan Insurance Group Employer/Plan Group    [place]      Payor Plan Address Payor Plan Phone Number Payor Plan Fax Number Effective " Dates    [address]   2022 - None Entered    [place] [address]       Subscriber Name Subscriber Birth Date Member ID       CATALINA CARRERA 1940 [id number]                 Emergency Contacts      (Rel.) Home Phone Work Phone Mobile Phone    MADHAV DEAN (Son) [phone] -- [phone]               History & Physical      Noemy Mead, APRN at 23 2208     Attestation signed by Lyla Costello MD at 23 2322    I was available to provide additional medical advice on behalf of the APRN at the time of admission on an as needed basis. I did not participate in a beside evaluation of the patient or provide direct medical care services to the patient.                       [facility] Medicine Services  History & Physical    Patient Name: Catalina Carrera  : 1940  MRN: [id number]  Primary Care Physician:  Rubio Dover MD  Date of admission: 2023  Date and Time of Service: 2023 at 2200    Subjective       Chief Complaint: Short of breath, generalized weakness, heart racing    History of Present Illness: Catalina Carrera is a 82 y.o. female with a past medical history of atrial fibrillation, systolic CHF, pulmonary hypertension, primary hypertension, restless leg syndrome, depression, and hypothyroidism who presented to [facility] on 2023 complaining of her heart racing, shortness of breath, and generalized weakness.  She reports that this has been occurring for approximately 2 days and she also states that she has had no appetite and a dry cough.  She denies any chest pain and has no other complaints.    In the ED, EKG showed atrial fibrillation with RVR, .  Troponin is negative, proBNP 9487, digoxin 0.40.  All other labs are unremarkable.  She is afebrile, vital signs are stable other than her tachycardia.  Chest x-ray showed stable chronic findings with no acute abnormality.  She was given 1 dose of digoxin, 125  mcg and started on a Cardizem drip.  Hospitalist was consulted for further care and management.    On chart review, the patient was discharged on 1/10/2023 after being admitted for atrial fibrillation with RVR.  On that admission her metoprolol succinate was increased to 100 mg daily.  Of note, she refused inpatient rehab and went home with self-care due to having a pet.    Review of Systems   Constitutional: Positive for malaise/fatigue.   HENT: Negative.    Eyes: Negative.    Cardiovascular: Positive for irregular heartbeat and palpitations.   Respiratory: Positive for cough (dry) and shortness of breath.    Hematologic/Lymphatic: Negative.    Skin: Negative.    Musculoskeletal: Negative.    Gastrointestinal: Negative.    Genitourinary: Negative.    Neurological: Negative.    Psychiatric/Behavioral: Negative.    Allergic/Immunologic: Negative.        Personal History     Past Medical History:   Diagnosis Date   • Acute kidney injury (HCC) 7/22/2022   • Anxiety    • Arthritis    • Atrial fibrillation (HCC)     paroxysmal   • Broken shoulder     left-- due to fall 11-7-19 was at [facility]   • Buttock pain     rt side   • DDD (degenerative disc disease), lumbar    • Depression    • Edema     9/2020 foot   • GERD (gastroesophageal reflux disease)    • H/O fall    • Hip pain     rt   • Hypertension    • Hypothyroidism    • Insomnia    • Irregular heart beat    • Leg pain     rt   • Low back pain    • Neuropathy    • Pain in both feet    • PONV (postoperative nausea and vomiting)    • Pulmonary hypertension (HCC)    • Radiculopathy    • Restless legs    • Spondylolisthesis    • Urinary incontinence        Past Surgical History:   Procedure Laterality Date   • BACK SURGERY  07/19/2018    PDC &  PSF L3-L5 insitu   • CARDIAC CATHETERIZATION N/A 4/2/2021    Procedure: Cardiac Catheterization/Vascular Study;  Surgeon: Barrett Evans MD;  Location: [facility] CATH INVASIVE LOCATION;  Service: Cardiovascular;  Laterality:  N/A;   • CHOLECYSTECTOMY     • COLONOSCOPY     • HYSTERECTOMY     • ROTATOR CUFF REPAIR Left        Family History: family history includes Cancer in her father, paternal aunt, and paternal uncle; Diabetes in her son; Heart disease in her paternal aunt. Otherwise pertinent FHx was reviewed and not pertinent to current issue.    Social History:  reports that she has never smoked. She has never used smokeless tobacco. She reports that she does not drink alcohol and does not use drugs.    Home Medications:  Prior to Admission Medications     Prescriptions Last Dose Informant Patient Reported? Taking?    apixaban (ELIQUIS) 5 MG tablet tablet   No No    Take 1 tablet by mouth Every 12 (Twelve) Hours. Indications: Atrial Fibrillation    atorvastatin (LIPITOR) 10 MG tablet   No No    Take 1 tablet by mouth Every Night.    digoxin (LANOXIN) 125 MCG tablet   No No    Take 1 tablet by mouth Daily.    famotidine (PEPCID) 20 MG tablet   Yes No    Take 20 mg by mouth Daily. Indications: Heartburn    gabapentin (NEURONTIN) 600 MG tablet   No No    TAKE 1 TABLET BY MOUTH FOUR TIMES DAILY    HYDROcodone-acetaminophen (NORCO) 7.5-325 MG per tablet   No No    Take 1 tablet by mouth 4 (Four) Times a Day As Needed for Severe Pain. DNF before 11/2/2022    levothyroxine (SYNTHROID, LEVOTHROID) 88 MCG tablet   Yes No    Take 88 mcg by mouth Daily. Indications: Underactive Thyroid    lidocaine (LIDODERM) 5 %   No No    UNWRAP AND APPLY 2 PATCH(ES) ON THE SKIN DAILY AS DIRECTED BY PROVIDER. REMOVE AND DISCARD PATCH(ES) WITHIN 12 HOURS OR AS DIRECTED BY MD    loperamide (IMODIUM A-D) 2 MG tablet   Yes No    Take 2 mg by mouth 4 (Four) Times a Day As Needed for Diarrhea. Indications: Diarrhea    metoprolol succinate XL (TOPROL-XL) 100 MG 24 hr tablet   No No    Take 1 tablet by mouth Daily. Indications: High Blood Pressure Disorder    pantoprazole (PROTONIX) 40 MG EC tablet   Yes No    Take 40 mg by mouth Daily With Lunch. Afternoons   Indications: Gastroesophageal Reflux Disease    rOPINIRole (REQUIP) 0.25 MG tablet   No No    Take 1 tablet by mouth Every Night. Take 1 hour before bedtime    sertraline (ZOLOFT) 50 MG tablet   Yes No    Take 50 mg by mouth Every Night. Indications: Major Depressive Disorder    sildenafil (REVATIO) 20 MG tablet   Yes No    Indications: Pulmonary Arterial Hypertension    tiZANidine (ZANAFLEX) 4 MG tablet   Yes No    Take 4 mg by mouth 2 (Two) Times a Day As Needed. Indications: Musculoskeletal Pain    vitamin B-12 (VITAMIN B-12) 1000 MCG tablet   No No    Take 1 tablet by mouth Daily.            Allergies:  Allergies   Allergen Reactions   • Baclofen Rash   • Codeine Nausea Only   • Ibuprofen Unknown (See Comments)     Patient doesn't know----Motin   • Methocarbamol Unknown (See Comments)     Patient doesn't know   • Naproxen Unknown (See Comments)     Pt. Doesn't know    • Tizanidine Hcl Other (See Comments)     Syncope    • Tolmetin Dizziness     Pt. Doesn't know-- same as Tolectin       Objective       Vitals:   Temp:  [98.7 °F (37.1 °C)] 98.7 °F (37.1 °C)  Heart Rate:  [] 103  Resp:  [18] 18  BP: (127-185)/() 165/82    Physical Exam  Vitals and nursing note reviewed.   Constitutional:       Appearance: Normal appearance. She is obese.   HENT:      Head: Normocephalic and atraumatic.      Nose: Nose normal.      Mouth/Throat:      Mouth: Mucous membranes are moist.   Eyes:      Extraocular Movements: Extraocular movements intact.      Pupils: Pupils are equal, round, and reactive to light.   Cardiovascular:      Rate and Rhythm: Normal rate. Rhythm irregularly irregular.      Pulses: Normal pulses.      Heart sounds: Normal heart sounds.      Comments: Cardizem infusing, HR 84  Pulmonary:      Effort: Pulmonary effort is normal.      Breath sounds: Normal breath sounds.   Abdominal:      General: Bowel sounds are normal.      Palpations: Abdomen is soft.   Musculoskeletal:         General: Normal  range of motion.      Cervical back: Normal range of motion.   Skin:     General: Skin is warm and dry.   Neurological:      General: No focal deficit present.      Mental Status: She is alert and oriented to person, place, and time. Mental status is at baseline.   Psychiatric:         Mood and Affect: Mood is anxious.         Behavior: Behavior normal. Behavior is cooperative.        Result Review    Result Review:  I have personally reviewed the results from the time of this admission to 1/14/2023 22:13 EST and agree with these findings:  [x]  Laboratory  []  Microbiology  [x]  Radiology  []  EKG/Telemetry   []  Cardiology/Vascular   []  Pathology  []  Old records  []  Other:  Most notable findings include: as above      Assessment & Plan        Active Hospital Problems:  Active Hospital Problems    Diagnosis    • **Atrial fibrillation with RVR ([facility])    • Restless legs syndrome    • Major depressive disorder, single episode, unspecified    • Generalized anxiety disorder    • Pulmonary hypertension, unspecified ([facility])    • Chronic low back pain    • Neuropathic pain    • Essential hypertension    • Depressive disorder    • Hypothyroidism      Plan:     Atrial fibrillation with RVR  -EKG reviewed  -Digoxin level 0.4, digoxin 125 mcg IV given in ED  -Cardizem bolus and Cardizem drip started in ED, continue  -Continuous cardiac monitoring  -Continue home digoxin, Eliquis  -Consult cardiology     Chronic systolic heart failure  Pulmonary hypertension   -EF 36 to 40% on 1/5/2023  -Continue home sildenafil    Essential Hypertension  -Controlled  -Monitor BP  -Continue home metoprolol succinate    Hyperlipidemia  -Continue home atorvastatin     Chronic pain  -Stable  -Continue home Neurontin, Zanaflex, Norco (inspect verified)  -Continue home Lidoderm patch     Depression  -Chronic, stable  -Continue home Zoloft      Hypothyroidism  -Continue home levothyroxine    Restless leg syndrome  -Continue home Requip    DVT  prophylaxis:  Medical DVT prophylaxis orders are present.    CODE STATUS:    Code Status (Patient has no pulse and is not breathing): CPR (Attempt to Resuscitate)  Medical Interventions (Patient has pulse or is breathing): Full Support    Admission Status:  I believe this patient meets inpatient status.    I discussed the patient's findings and my recommendations with patient.    This patient has been examined wearing appropriate Personal Protective Equipment  01/14/23      Signature: Electronically signed by Noemy Mead DNP, APRN, 01/14/23, 22:13 EST.  [facility] Hospitalist Team    Electronically signed by Lyla Costello MD at 01/14/23 [id number]

## 2023-01-16 NOTE — PLAN OF CARE
Goal Outcome Evaluation:  Plan of Care Reviewed With: patient        Progress: no change  Outcome Evaluation: Pt had a QTc over 500 after first dose of tikosyn. Will call cardiology in am before next dose for further administration instructions. Pt has been resting comfortably. Pt is still in A-fib but at a controled heart rate. All other VSS so far this shift.      Problem: Fall Injury Risk  Goal: Absence of Fall and Fall-Related Injury  Outcome: Ongoing, Progressing  Intervention: Identify and Manage Contributors  Recent Flowsheet Documentation  Taken 1/16/2023 0419 by Veronica Gaxiola RN  Medication Review/Management: medications reviewed  Self-Care Promotion: independence encouraged  Taken 1/16/2023 0250 by Veronica Gaxiola RN  Medication Review/Management: medications reviewed  Taken 1/16/2023 0045 by Veronica Gaxiola RN  Medication Review/Management: medications reviewed  Self-Care Promotion: independence encouraged  Taken 1/15/2023 2242 by Veronica Gaxiola RN  Medication Review/Management: medications reviewed  Self-Care Promotion: independence encouraged  Taken 1/15/2023 2210 by Veronica Gaxiola RN  Medication Review/Management: medications reviewed  Intervention: Promote Injury-Free Environment  Recent Flowsheet Documentation  Taken 1/16/2023 0419 by Veronica Gaxiola RN  Safety Promotion/Fall Prevention:   activity supervised   assistive device/personal items within reach   clutter free environment maintained   nonskid shoes/slippers when out of bed   safety round/check completed  Taken 1/16/2023 0250 by Veronica Gaxiola RN  Safety Promotion/Fall Prevention:   activity supervised   assistive device/personal items within reach   clutter free environment maintained   nonskid shoes/slippers when out of bed   room organization consistent   safety round/check completed  Taken 1/16/2023 0045 by Veronica Gaxiola RN  Safety Promotion/Fall Prevention:   activity supervised   assistive device/personal items within  reach   clutter free environment maintained   nonskid shoes/slippers when out of bed   room organization consistent   safety round/check completed  Taken 1/15/2023 2242 by Veronica Gaxiola RN  Safety Promotion/Fall Prevention:   activity supervised   assistive device/personal items within reach   clutter free environment maintained   nonskid shoes/slippers when out of bed   room organization consistent   safety round/check completed  Taken 1/15/2023 2210 by Veronica Gaxiola RN  Safety Promotion/Fall Prevention:   activity supervised   assistive device/personal items within reach   clutter free environment maintained   nonskid shoes/slippers when out of bed   room organization consistent   safety round/check completed     Problem: Adult Inpatient Plan of Care  Goal: Plan of Care Review  Outcome: Ongoing, Progressing  Flowsheets (Taken 1/16/2023 0422)  Progress: no change  Plan of Care Reviewed With: patient  Outcome Evaluation: Pt had a QTc over 500 after first dose of tikosyn. Will call cardiology in am before next dose for further administration instructions. Pt has been resting comfortably. Pt is still in A-fib but at a controled heart rate. All other VSS so far this shift.  Goal: Patient-Specific Goal (Individualized)  Outcome: Ongoing, Progressing  Goal: Absence of Hospital-Acquired Illness or Injury  Outcome: Ongoing, Progressing  Intervention: Identify and Manage Fall Risk  Recent Flowsheet Documentation  Taken 1/16/2023 0419 by Veronica Gaxiola RN  Safety Promotion/Fall Prevention:   activity supervised   assistive device/personal items within reach   clutter free environment maintained   nonskid shoes/slippers when out of bed   safety round/check completed  Taken 1/16/2023 0250 by Veronica Gaxiola RN  Safety Promotion/Fall Prevention:   activity supervised   assistive device/personal items within reach   clutter free environment maintained   nonskid shoes/slippers when out of bed   room organization  consistent   safety round/check completed  Taken 1/16/2023 0045 by Veronica Gaxiola RN  Safety Promotion/Fall Prevention:   activity supervised   assistive device/personal items within reach   clutter free environment maintained   nonskid shoes/slippers when out of bed   room organization consistent   safety round/check completed  Taken 1/15/2023 2242 by Veronica Gaxiola RN  Safety Promotion/Fall Prevention:   activity supervised   assistive device/personal items within reach   clutter free environment maintained   nonskid shoes/slippers when out of bed   room organization consistent   safety round/check completed  Taken 1/15/2023 2210 by Veronica Gaxiola RN  Safety Promotion/Fall Prevention:   activity supervised   assistive device/personal items within reach   clutter free environment maintained   nonskid shoes/slippers when out of bed   room organization consistent   safety round/check completed  Intervention: Prevent Skin Injury  Recent Flowsheet Documentation  Taken 1/16/2023 0419 by Veronica Gaxiola RN  Body Position: position changed independently  Skin Protection: adhesive use limited  Taken 1/16/2023 0045 by Veronica Gaxiola RN  Body Position: position changed independently  Skin Protection: adhesive use limited  Taken 1/15/2023 2242 by Veronica Gaxiola RN  Body Position: position changed independently  Skin Protection: adhesive use limited  Taken 1/15/2023 2210 by Veronica Gaxiola RN  Body Position: position changed independently  Intervention: Prevent and Manage VTE (Venous Thromboembolism) Risk  Recent Flowsheet Documentation  Taken 1/16/2023 0419 by Veronica Gaxiola RN  Activity Management:   activity adjusted per tolerance   activity encouraged  VTE Prevention/Management: patient refused intervention  Range of Motion: active ROM (range of motion) encouraged  Taken 1/16/2023 0045 by Veronica Gaxiola RN  Activity Management:   activity adjusted per tolerance   activity encouraged  VTE  Prevention/Management: patient refused intervention  Range of Motion: active ROM (range of motion) encouraged  Taken 1/15/2023 2242 by Veronica Gaxiola RN  Activity Management:   activity adjusted per tolerance   activity encouraged  VTE Prevention/Management: patient refused intervention  Range of Motion: active ROM (range of motion) encouraged  Intervention: Prevent Infection  Recent Flowsheet Documentation  Taken 1/16/2023 0419 by Veronica Gaxiola RN  Infection Prevention:   personal protective equipment utilized   hand hygiene promoted   rest/sleep promoted   single patient room provided   environmental surveillance performed  Taken 1/16/2023 0250 by Veronica Gaxiola RN  Infection Prevention:   hand hygiene promoted   personal protective equipment utilized   rest/sleep promoted   single patient room provided   environmental surveillance performed  Taken 1/16/2023 0045 by Veronica Gaxiola RN  Infection Prevention:   hand hygiene promoted   personal protective equipment utilized   rest/sleep promoted   single patient room provided   environmental surveillance performed  Taken 1/15/2023 2242 by Veronica Gaxiola RN  Infection Prevention:   hand hygiene promoted   personal protective equipment utilized   rest/sleep promoted   single patient room provided   environmental surveillance performed  Taken 1/15/2023 2210 by Veronica Gaxiola RN  Infection Prevention:   hand hygiene promoted   personal protective equipment utilized   rest/sleep promoted   single patient room provided   environmental surveillance performed  Goal: Optimal Comfort and Wellbeing  Outcome: Ongoing, Progressing  Intervention: Provide Person-Centered Care  Recent Flowsheet Documentation  Taken 1/16/2023 0045 by Veronica Gaxiola RN  Trust Relationship/Rapport: care explained  Taken 1/15/2023 2242 by Veronica Gaxiola RN  Trust Relationship/Rapport: care explained  Goal: Readiness for Transition of Care  Outcome: Ongoing, Progressing     Problem:  Asthma Comorbidity  Goal: Maintenance of Asthma Control  Outcome: Ongoing, Progressing  Intervention: Maintain Asthma Symptom Control  Recent Flowsheet Documentation  Taken 1/16/2023 0419 by Veronica Gaxiola RN  Medication Review/Management: medications reviewed  Taken 1/16/2023 0250 by Veronica Gaxiola RN  Medication Review/Management: medications reviewed  Taken 1/16/2023 0045 by Veronica Gaxiola RN  Medication Review/Management: medications reviewed  Taken 1/15/2023 2242 by Veronica Gaxiola RN  Medication Review/Management: medications reviewed  Taken 1/15/2023 2210 by Veronica Gaxiola RN  Medication Review/Management: medications reviewed     Problem: Behavioral Health Comorbidity  Goal: Maintenance of Behavioral Health Symptom Control  Outcome: Ongoing, Progressing  Intervention: Maintain Behavioral Health Symptom Control  Recent Flowsheet Documentation  Taken 1/16/2023 0419 by Veronica Gaxiola RN  Medication Review/Management: medications reviewed  Taken 1/16/2023 0250 by Veronica Gaxiola RN  Medication Review/Management: medications reviewed  Taken 1/16/2023 0045 by Veronica Gaxiola RN  Medication Review/Management: medications reviewed  Taken 1/15/2023 2242 by Veronica Gaxiola RN  Medication Review/Management: medications reviewed  Taken 1/15/2023 2210 by Veronica Gaxiola RN  Medication Review/Management: medications reviewed     Problem: COPD (Chronic Obstructive Pulmonary Disease) Comorbidity  Goal: Maintenance of COPD Symptom Control  Outcome: Ongoing, Progressing  Intervention: Maintain COPD-Symptom Control  Recent Flowsheet Documentation  Taken 1/16/2023 0419 by Veronica Gaxiola RN  Supportive Measures: active listening utilized  Medication Review/Management: medications reviewed  Taken 1/16/2023 0250 by Veronica Gaxiola RN  Medication Review/Management: medications reviewed  Taken 1/16/2023 0045 by Veronica Gaxiola RN  Supportive Measures: active listening utilized  Medication Review/Management:  medications reviewed  Taken 1/15/2023 2242 by Veronica Gaxiola RN  Supportive Measures: active listening utilized  Medication Review/Management: medications reviewed  Taken 1/15/2023 2210 by Veronica Gaxiola RN  Medication Review/Management: medications reviewed     Problem: Diabetes Comorbidity  Goal: Blood Glucose Level Within Targeted Range  Outcome: Ongoing, Progressing     Problem: Heart Failure Comorbidity  Goal: Maintenance of Heart Failure Symptom Control  Outcome: Ongoing, Progressing  Intervention: Maintain Heart Failure-Management  Recent Flowsheet Documentation  Taken 1/16/2023 0419 by Veronica Gaxiola RN  Medication Review/Management: medications reviewed  Taken 1/16/2023 0250 by Veronica Gaxiola RN  Medication Review/Management: medications reviewed  Taken 1/16/2023 0045 by Veronica Gaxiola RN  Medication Review/Management: medications reviewed  Taken 1/15/2023 2242 by Veronica Gaxiola RN  Medication Review/Management: medications reviewed  Taken 1/15/2023 2210 by Veronica Gaxiola RN  Medication Review/Management: medications reviewed     Problem: Hypertension Comorbidity  Goal: Blood Pressure in Desired Range  Outcome: Ongoing, Progressing  Intervention: Maintain Blood Pressure Management  Recent Flowsheet Documentation  Taken 1/16/2023 0419 by Veronica Gaxiola RN  Medication Review/Management: medications reviewed  Taken 1/16/2023 0250 by Veronica Gaxiola RN  Medication Review/Management: medications reviewed  Taken 1/16/2023 0045 by Veronica Gaxiola RN  Medication Review/Management: medications reviewed  Taken 1/15/2023 2242 by Veronica Gaxiola RN  Medication Review/Management: medications reviewed  Taken 1/15/2023 2210 by Veronica Gaxiola RN  Medication Review/Management: medications reviewed     Problem: Obstructive Sleep Apnea Risk or Actual Comorbidity Management  Goal: Unobstructed Breathing During Sleep  Outcome: Ongoing, Progressing     Problem: Osteoarthritis Comorbidity  Goal: Maintenance  of Osteoarthritis Symptom Control  Outcome: Ongoing, Progressing  Intervention: Maintain Osteoarthritis Symptom Control  Recent Flowsheet Documentation  Taken 1/16/2023 0419 by Veronica Gaxiola RN  Activity Management:   activity adjusted per tolerance   activity encouraged  Assistive Device Utilized: walker  Medication Review/Management: medications reviewed  Taken 1/16/2023 0250 by Veronica Gaxiola RN  Medication Review/Management: medications reviewed  Taken 1/16/2023 0045 by Veronica Gaxiola RN  Activity Management:   activity adjusted per tolerance   activity encouraged  Medication Review/Management: medications reviewed  Taken 1/15/2023 2242 by Veronica Gaxiola RN  Activity Management:   activity adjusted per tolerance   activity encouraged  Medication Review/Management: medications reviewed  Taken 1/15/2023 2210 by Veronica Gaxiola RN  Medication Review/Management: medications reviewed     Problem: Pain Chronic (Persistent) (Comorbidity Management)  Goal: Acceptable Pain Control and Functional Ability  Outcome: Ongoing, Progressing  Intervention: Manage Persistent Pain  Recent Flowsheet Documentation  Taken 1/16/2023 0419 by Veronica Gaxiola RN  Medication Review/Management: medications reviewed  Taken 1/16/2023 0250 by Veronica Gaxiola RN  Medication Review/Management: medications reviewed  Taken 1/16/2023 0045 by Veronica Gaxiola RN  Medication Review/Management: medications reviewed  Taken 1/15/2023 2242 by Veronica Gaxiola RN  Medication Review/Management: medications reviewed  Taken 1/15/2023 2210 by Veronica Gaxiola RN  Medication Review/Management: medications reviewed  Intervention: Optimize Psychosocial Wellbeing  Recent Flowsheet Documentation  Taken 1/16/2023 0419 by Veronica Gaxiola RN  Supportive Measures: active listening utilized  Diversional Activities: television  Family/Support System Care: self-care encouraged  Taken 1/16/2023 0045 by Veronica Gaxiola RN  Supportive Measures: active  listening utilized  Diversional Activities: television  Family/Support System Care: self-care encouraged  Taken 1/15/2023 2242 by Veronica Gaxiola RN  Supportive Measures: active listening utilized  Diversional Activities: television  Family/Support System Care: self-care encouraged     Problem: Seizure Disorder Comorbidity  Goal: Maintenance of Seizure Control  Outcome: Ongoing, Progressing     Problem: Skin Injury Risk Increased  Goal: Skin Health and Integrity  Outcome: Ongoing, Progressing  Intervention: Optimize Skin Protection  Recent Flowsheet Documentation  Taken 1/16/2023 0419 by Veronica Gaxiola RN  Pressure Reduction Techniques: frequent weight shift encouraged  Head of Bed (HOB) Positioning: HOB at 30-45 degrees  Pressure Reduction Devices: pressure-redistributing mattress utilized  Skin Protection: adhesive use limited  Taken 1/16/2023 0045 by Veronica Gaxiola RN  Pressure Reduction Techniques: frequent weight shift encouraged  Head of Bed (HOB) Positioning: HOB at 30 degrees  Pressure Reduction Devices: pressure-redistributing mattress utilized  Skin Protection: adhesive use limited  Taken 1/15/2023 2242 by Veronica Gaxiola RN  Head of Bed (HOB) Positioning: HOB elevated  Skin Protection: adhesive use limited  Taken 1/15/2023 2210 by Veronica Gaxiola RN  Head of Bed (HOB) Positioning: HOB at 30 degrees

## 2023-01-16 NOTE — PLAN OF CARE
Goal Outcome Evaluation:           Progress: no change  Outcome Evaluation: Second dose of tikosyn given this morning, pt had 8.47 pause and bradycardiac a couple hours after tikosyn. Tikosyn discontinued and pt possibly getting pacemaker. Will continue to monitor

## 2023-01-16 NOTE — PROGRESS NOTES
CC--- persistent atrial fibrillation with significant symptoms and cardiomyopathy    Sub  Post Tikosyn initiation and after second dose patient had a sinus rhythm with marked sinus bradycardia and prolonged QT and prior to that significant first-degree AV block.  Beta-blockers are not being given and Tikosyn stopped because of marked QT elongation and patient became vagal intravenous fluids were given with improvement.          Past Medical History:   Diagnosis Date   • Acute kidney injury ([facility]) 07/22/2022   • Anxiety    • Arthritis    • Atrial fibrillation ([facility])     paroxysmal   • Broken shoulder     left-- due to fall 11-7-19 was at [facility]   • Chronic systolic CHF (congestive heart failure) ([facility]) 01/05/2023    EF 36-40%   • Coronary artery disease involving native coronary artery of native heart without angina pectoris 04/02/2021    nonobstructive   • DDD (degenerative disc disease), lumbar    • Depression    • Edema     9/2020 foot   • GERD (gastroesophageal reflux disease)    • H/O fall    • Heart failure with mid-range ejection fraction ([facility]) 03/05/2022    EF 45% per 2D echo   • Hypertension    • Hypothyroidism    • Insomnia    • Low back pain    • Moderate mitral regurgitation 1/5/2023   • Neuropathy    • Pain in both feet    • PONV (postoperative nausea and vomiting)    • Pulmonary hypertension ([facility])    • Radiculopathy    • Restless legs    • Spondylolisthesis    • Stage 3a chronic kidney disease ([facility])    • Urinary incontinence      Past Surgical History:   Procedure Laterality Date   • BACK SURGERY  07/19/2018    PDC &  PSF L3-L5 insitu   • CARDIAC CATHETERIZATION N/A 4/2/2021    Procedure: Cardiac Catheterization/Vascular Study;  Surgeon: Barrett Evans MD;  Location: [facility] INVASIVE LOCATION;  Service: Cardiovascular;  Laterality: N/A;   • CHOLECYSTECTOMY     • COLONOSCOPY     • HYSTERECTOMY     • ROTATOR CUFF REPAIR Left      Family History   Problem Relation Age of Onset   • Cancer  Father    • Diabetes Son    • Cancer Paternal Aunt    • Heart disease Paternal Aunt    • Cancer Paternal Uncle      Social History     Tobacco Use   • Smoking status: Never   • Smokeless tobacco: Never   Vaping Use   • Vaping Use: Never used   Substance Use Topics   • Alcohol use: No   • Drug use: No       Review of Systems   General:  positive for fatigue and tiredness  Eyes: No redness  Cardiovascular: No chest pain, no palpitations  Respiratory:   positive for class 3 shortness of breath  Gastrointestinal: No nausea or vomiting, bleeding  Genitourinary: no hematuria or dysuria  Musculoskeletal: No arthralgia or myalgia  Skin: No rash  Neurologic: No numbness, tingling, syncope  Hematologic/Lymphatic: No abnormal bleeding      Physical Exam  VITALS REVIEWED  3070 blood pressure 105/70 patient is afebrile respiration 12 times a minute  General:      well developed, well nourished, in no acute distress.    Head:      normocephalic and atraumatic.    Eyes:      PERRL/EOM intact, conjunctivae and sclerae clear without nystagmus.    Neck:      no masses, thyromegaly,  trachea central with normal respiratory effort   Lungs:      clear bilaterally to auscultation.    Heart:       Sinus rhythm with muffled heart sounds   Msk:      Positive for mild kyphosis  Pulses:      pulses normal in all 4 extremities.    Extremities:       no cyanosis or clubbing--trace left pedal edema and trace right pedal edema.    Neurologic:      no focal deficits.   alert oriented x3  Skin:      intact without lesions or rashes.    Psych:      alert and cooperative; normal mood and affect; normal attention span and concentration.          CBC    Results from last 7 days   Lab Units 01/16/23  0057 01/15/23  1453 01/15/23  1058 01/14/23  2022 01/10/23  0339   WBC 10*3/mm3 5.40  --  4.30 6.90 4.50   HEMOGLOBIN g/dL 9.8* 10.4* 9.7* 11.2* 9.5*   PLATELETS 10*3/mm3 199  --  198 233 171     BMP   Results from last 7 days   Lab Units 01/16/23 0057  01/15/23  1503 01/15/23  1058 01/14/23  2022 01/10/23  0339   SODIUM mmol/L 137 138 138 140 141   POTASSIUM mmol/L 4.3 3.7 3.9 4.0 4.8   CHLORIDE mmol/L 103 101 102 102 103   CO2 mmol/L 26.0 26.0 27.0 26.0 29.0   BUN mg/dL 21 18 17 10 21   CREATININE mg/dL 0.93 0.85 0.91 0.86 1.25*   GLUCOSE mg/dL 97 131* 146* 107* 132*   MAGNESIUM mg/dL 3.1*  --  1.9 1.9 2.1   PHOSPHORUS mg/dL  --   --   --   --  4.4     Radiology(recent) XR Chest 1 View    Result Date: 1/14/2023  Impression: Stable chronic findings without acute process. Electronically Signed: Edil Joshua  1/14/2023 8:14 PM EST  Workstation ID: [id number]        A/P    EKGs have been reviewed for last several years.  Patient was in sinus rhythm back in March and April 2021.  EKG starting in number of 2021 patient started having atrial fibrillation.   patient is having atrial fibrillation for nearly a year and a half.  Feels poorly with ongoing atrial fibrillation.  Cardiac work-up revealed progressive development of LV dysfunction on this patient.  This Tikosyn initiation to marked sinus bradycardia and first-degree block and prolonged QT  Beta-blockers and Tikosyn have been stopped.  Patient appears like having significant sick sinus syndrome picture.  Patient to be scheduled for pacemaker implantation followed by initiation of antiarrhythmic with amiodarone to reduce recurrent atrial fibrillation.  Risks and benefits and outcomes of pacemaker educated and orders placed.  Eliquis has been held.  Other option would be AV node ablation in future if AF cannot be controlled.    I will plan for either paraseptal lead or BiV pacing.  Orders placed.      Electronically signed by Baljeet Valero MD, 01/16/23, 5:46 PM EST.

## 2023-01-17 ENCOUNTER — ANESTHESIA (OUTPATIENT)
Dept: CARDIOLOGY | Facility: HOSPITAL | Age: 83
DRG: 243 | End: 2023-01-17
Payer: MEDICARE

## 2023-01-17 ENCOUNTER — ANESTHESIA EVENT (OUTPATIENT)
Dept: CARDIOLOGY | Facility: HOSPITAL | Age: 83
DRG: 243 | End: 2023-01-17
Payer: MEDICARE

## 2023-01-17 ENCOUNTER — APPOINTMENT (OUTPATIENT)
Dept: GENERAL RADIOLOGY | Facility: HOSPITAL | Age: 83
DRG: 243 | End: 2023-01-17
Payer: MEDICARE

## 2023-01-17 VITALS — OXYGEN SATURATION: 100 % | DIASTOLIC BLOOD PRESSURE: 67 MMHG | SYSTOLIC BLOOD PRESSURE: 150 MMHG | HEART RATE: 70 BPM

## 2023-01-17 PROBLEM — I49.5 SICK SINUS SYNDROME (HCC): Status: ACTIVE | Noted: 2023-01-14

## 2023-01-17 LAB
ANION GAP SERPL CALCULATED.3IONS-SCNC: 8 MMOL/L (ref 5–15)
ANISOCYTOSIS BLD QL: ABNORMAL
BASOPHILS # BLD MANUAL: 0.1 10*3/MM3 (ref 0–0.2)
BASOPHILS NFR BLD MANUAL: 2 % (ref 0–1.5)
BUN SERPL-MCNC: 21 MG/DL (ref 8–23)
BUN/CREAT SERPL: 22.6 (ref 7–25)
CALCIUM SPEC-SCNC: 7.7 MG/DL (ref 8.6–10.5)
CHLORIDE SERPL-SCNC: 106 MMOL/L (ref 98–107)
CO2 SERPL-SCNC: 23 MMOL/L (ref 22–29)
CREAT SERPL-MCNC: 0.93 MG/DL (ref 0.57–1)
DACRYOCYTES BLD QL SMEAR: ABNORMAL
DEPRECATED RDW RBC AUTO: 53.8 FL (ref 37–54)
EGFRCR SERPLBLD CKD-EPI 2021: 61.9 ML/MIN/1.73
EOSINOPHIL # BLD MANUAL: 0.1 10*3/MM3 (ref 0–0.4)
EOSINOPHIL NFR BLD MANUAL: 2 % (ref 0.3–6.2)
ERYTHROCYTE [DISTWIDTH] IN BLOOD BY AUTOMATED COUNT: 18 % (ref 12.3–15.4)
GLUCOSE SERPL-MCNC: 113 MG/DL (ref 65–99)
HCT VFR BLD AUTO: 30.7 % (ref 34–46.6)
HGB BLD-MCNC: 9.4 G/DL (ref 12–15.9)
LYMPHOCYTES # BLD MANUAL: 1.86 10*3/MM3 (ref 0.7–3.1)
LYMPHOCYTES NFR BLD MANUAL: 10 % (ref 5–12)
MAGNESIUM SERPL-MCNC: 2.2 MG/DL (ref 1.6–2.4)
MCH RBC QN AUTO: 25.9 PG (ref 26.6–33)
MCHC RBC AUTO-ENTMCNC: 30.7 G/DL (ref 31.5–35.7)
MCV RBC AUTO: 84.3 FL (ref 79–97)
MICROCYTES BLD QL: ABNORMAL
MONOCYTES # BLD: 0.49 10*3/MM3 (ref 0.1–0.9)
NEUTROPHILS # BLD AUTO: 2.35 10*3/MM3 (ref 1.7–7)
NEUTROPHILS NFR BLD MANUAL: 46 % (ref 42.7–76)
NEUTS BAND NFR BLD MANUAL: 2 % (ref 0–5)
PLATELET # BLD AUTO: 184 10*3/MM3 (ref 140–450)
PMV BLD AUTO: 8.7 FL (ref 6–12)
POIKILOCYTOSIS BLD QL SMEAR: ABNORMAL
POTASSIUM SERPL-SCNC: 5.2 MMOL/L (ref 3.5–5.2)
RBC # BLD AUTO: 3.65 10*6/MM3 (ref 3.77–5.28)
SCAN SLIDE: NORMAL
SMALL PLATELETS BLD QL SMEAR: ADEQUATE
SODIUM SERPL-SCNC: 137 MMOL/L (ref 136–145)
VARIANT LYMPHS NFR BLD MANUAL: 2 % (ref 0–5)
VARIANT LYMPHS NFR BLD MANUAL: 36 % (ref 19.6–45.3)
WBC MORPH BLD: NORMAL
WBC NRBC COR # BLD: 4.9 10*3/MM3 (ref 3.4–10.8)

## 2023-01-17 PROCEDURE — 85007 BL SMEAR W/DIFF WBC COUNT: CPT

## 2023-01-17 PROCEDURE — C1894 INTRO/SHEATH, NON-LASER: HCPCS | Performed by: INTERNAL MEDICINE

## 2023-01-17 PROCEDURE — C1898 LEAD, PMKR, OTHER THAN TRANS: HCPCS | Performed by: INTERNAL MEDICINE

## 2023-01-17 PROCEDURE — 93005 ELECTROCARDIOGRAM TRACING: CPT | Performed by: INTERNAL MEDICINE

## 2023-01-17 PROCEDURE — C1785 PMKR, DUAL, RATE-RESP: HCPCS | Performed by: INTERNAL MEDICINE

## 2023-01-17 PROCEDURE — 02HK3JZ INSERTION OF PACEMAKER LEAD INTO RIGHT VENTRICLE, PERCUTANEOUS APPROACH: ICD-10-PCS | Performed by: INTERNAL MEDICINE

## 2023-01-17 PROCEDURE — 85025 COMPLETE CBC W/AUTO DIFF WBC: CPT

## 2023-01-17 PROCEDURE — B5171ZZ FLUOROSCOPY OF LEFT SUBCLAVIAN VEIN USING LOW OSMOLAR CONTRAST: ICD-10-PCS | Performed by: INTERNAL MEDICINE

## 2023-01-17 PROCEDURE — 83735 ASSAY OF MAGNESIUM: CPT | Performed by: INTERNAL MEDICINE

## 2023-01-17 PROCEDURE — 80048 BASIC METABOLIC PNL TOTAL CA: CPT

## 2023-01-17 PROCEDURE — 33208 INSRT HEART PM ATRIAL & VENT: CPT | Performed by: INTERNAL MEDICINE

## 2023-01-17 PROCEDURE — B2141ZZ FLUOROSCOPY OF RIGHT HEART USING LOW OSMOLAR CONTRAST: ICD-10-PCS | Performed by: INTERNAL MEDICINE

## 2023-01-17 PROCEDURE — 25010000002 ONDANSETRON PER 1 MG: Performed by: INTERNAL MEDICINE

## 2023-01-17 PROCEDURE — 0JH606Z INSERTION OF PACEMAKER, DUAL CHAMBER INTO CHEST SUBCUTANEOUS TISSUE AND FASCIA, OPEN APPROACH: ICD-10-PCS | Performed by: INTERNAL MEDICINE

## 2023-01-17 PROCEDURE — C1889 IMPLANT/INSERT DEVICE, NOC: HCPCS | Performed by: INTERNAL MEDICINE

## 2023-01-17 PROCEDURE — 71045 X-RAY EXAM CHEST 1 VIEW: CPT

## 2023-01-17 PROCEDURE — 02H63JZ INSERTION OF PACEMAKER LEAD INTO RIGHT ATRIUM, PERCUTANEOUS APPROACH: ICD-10-PCS | Performed by: INTERNAL MEDICINE

## 2023-01-17 PROCEDURE — C1769 GUIDE WIRE: HCPCS | Performed by: INTERNAL MEDICINE

## 2023-01-17 PROCEDURE — 99232 SBSQ HOSP IP/OBS MODERATE 35: CPT | Performed by: INTERNAL MEDICINE

## 2023-01-17 PROCEDURE — 0 IOPAMIDOL PER 1 ML: Performed by: INTERNAL MEDICINE

## 2023-01-17 PROCEDURE — 25010000002 CEFAZOLIN PER 500 MG: Performed by: INTERNAL MEDICINE

## 2023-01-17 PROCEDURE — 25010000002 FENTANYL CITRATE (PF) 100 MCG/2ML SOLUTION: Performed by: ANESTHESIOLOGY

## 2023-01-17 PROCEDURE — 36415 COLL VENOUS BLD VENIPUNCTURE: CPT

## 2023-01-17 PROCEDURE — 93010 ELECTROCARDIOGRAM REPORT: CPT | Performed by: INTERNAL MEDICINE

## 2023-01-17 PROCEDURE — 25010000002 PROPOFOL 10 MG/ML EMULSION: Performed by: ANESTHESIOLOGY

## 2023-01-17 DEVICE — PACEMAKER
Type: IMPLANTABLE DEVICE | Status: FUNCTIONAL
Brand: ACCOLADE™ MRI DR

## 2023-01-17 DEVICE — PACE/SENSE LEAD
Type: IMPLANTABLE DEVICE | Status: FUNCTIONAL
Brand: INGEVITY™+

## 2023-01-17 DEVICE — THE CANGAROO® ENVELOPE IS INTENDED TO SECURELY HOLD A CARDIAC IMPLANTABLE ELECTRONIC DEVICE OR AN IMPLANTABLE NEUROSTIMULATOR TO CREATE A STABLE ENVIRONMENT WHEN IMPLANTED IN THE BODY. THE CARDIAC IMPLANTABLE ELECTRONIC DEVICES THAT MAY BE USED WITH THE CANGAROO® ENVELOPE INCLUDE PACEMAKER PULSE GENERATORS, DEFIBRILLATORS, OR OTHER CARDIAC IMPLANTABLE ELECTRONIC DEVICES. THE IMPLANTABLE NEUROSTIMULATOR DEVICES THAT MAY BE USED WITH THE CANGAROO® ENVELOPE INCLUDE VAGUS NERVE STIMULATORS, SPINAL CORDNEUROMODULATORS, DEEP BRAIN STIMULATORS AND SACRAL NERVE STIMULATORS.
Type: IMPLANTABLE DEVICE | Site: CHEST | Status: FUNCTIONAL
Brand: CANGAROO ENVELOPE

## 2023-01-17 RX ORDER — DIGOXIN 125 MCG
125 TABLET ORAL
Status: DISCONTINUED | OUTPATIENT
Start: 2023-01-18 | End: 2023-01-20 | Stop reason: HOSPADM

## 2023-01-17 RX ORDER — NALOXONE HCL 0.4 MG/ML
0.4 VIAL (ML) INJECTION
Status: DISCONTINUED | OUTPATIENT
Start: 2023-01-17 | End: 2023-01-20 | Stop reason: HOSPADM

## 2023-01-17 RX ORDER — SODIUM CHLORIDE 9 MG/ML
40 INJECTION, SOLUTION INTRAVENOUS AS NEEDED
Status: DISCONTINUED | OUTPATIENT
Start: 2023-01-17 | End: 2023-01-17

## 2023-01-17 RX ORDER — LIDOCAINE HYDROCHLORIDE AND EPINEPHRINE BITARTRATE 20; .01 MG/ML; MG/ML
INJECTION, SOLUTION SUBCUTANEOUS
Status: DISCONTINUED | OUTPATIENT
Start: 2023-01-17 | End: 2023-01-17 | Stop reason: HOSPADM

## 2023-01-17 RX ORDER — ONDANSETRON 2 MG/ML
4 INJECTION INTRAMUSCULAR; INTRAVENOUS EVERY 6 HOURS PRN
Status: DISCONTINUED | OUTPATIENT
Start: 2023-01-17 | End: 2023-01-17 | Stop reason: SDUPTHER

## 2023-01-17 RX ORDER — SODIUM CHLORIDE 0.9 % (FLUSH) 0.9 %
3 SYRINGE (ML) INJECTION EVERY 12 HOURS SCHEDULED
Status: DISCONTINUED | OUTPATIENT
Start: 2023-01-17 | End: 2023-01-17

## 2023-01-17 RX ORDER — SOTALOL HYDROCHLORIDE 80 MG/1
80 TABLET ORAL EVERY 12 HOURS SCHEDULED
Status: DISCONTINUED | OUTPATIENT
Start: 2023-01-17 | End: 2023-01-20 | Stop reason: HOSPADM

## 2023-01-17 RX ORDER — SODIUM CHLORIDE 0.9 % (FLUSH) 0.9 %
3-10 SYRINGE (ML) INJECTION AS NEEDED
Status: DISCONTINUED | OUTPATIENT
Start: 2023-01-17 | End: 2023-01-17

## 2023-01-17 RX ORDER — SODIUM CHLORIDE 9 MG/ML
INJECTION, SOLUTION INTRAVENOUS CONTINUOUS PRN
Status: DISCONTINUED | OUTPATIENT
Start: 2023-01-17 | End: 2023-01-17 | Stop reason: SURG

## 2023-01-17 RX ORDER — EPHEDRINE SULFATE 5 MG/ML
INJECTION INTRAVENOUS AS NEEDED
Status: DISCONTINUED | OUTPATIENT
Start: 2023-01-17 | End: 2023-01-17 | Stop reason: SURG

## 2023-01-17 RX ORDER — FENTANYL CITRATE 50 UG/ML
INJECTION, SOLUTION INTRAMUSCULAR; INTRAVENOUS AS NEEDED
Status: DISCONTINUED | OUTPATIENT
Start: 2023-01-17 | End: 2023-01-17 | Stop reason: SURG

## 2023-01-17 RX ORDER — LIDOCAINE HYDROCHLORIDE 10 MG/ML
INJECTION, SOLUTION EPIDURAL; INFILTRATION; INTRACAUDAL; PERINEURAL AS NEEDED
Status: DISCONTINUED | OUTPATIENT
Start: 2023-01-17 | End: 2023-01-17 | Stop reason: SURG

## 2023-01-17 RX ADMIN — LIDOCAINE HYDROCHLORIDE 50 MG: 10 INJECTION, SOLUTION EPIDURAL; INFILTRATION; INTRACAUDAL; PERINEURAL at 11:20

## 2023-01-17 RX ADMIN — PROPOFOL INJECTABLE EMULSION 75 MCG/KG/MIN: 10 INJECTION, EMULSION INTRAVENOUS at 11:20

## 2023-01-17 RX ADMIN — GABAPENTIN 600 MG: 600 TABLET, FILM COATED ORAL at 20:20

## 2023-01-17 RX ADMIN — CEFAZOLIN 2 G: 2 INJECTION, POWDER, FOR SOLUTION INTRAMUSCULAR; INTRAVENOUS at 10:24

## 2023-01-17 RX ADMIN — Medication 10 ML: at 21:12

## 2023-01-17 RX ADMIN — ROPINIROLE HYDROCHLORIDE 0.25 MG: 0.25 TABLET, FILM COATED ORAL at 20:20

## 2023-01-17 RX ADMIN — HYDROCODONE BITARTRATE AND ACETAMINOPHEN 1 TABLET: 7.5; 325 TABLET ORAL at 14:07

## 2023-01-17 RX ADMIN — HYDROCODONE BITARTRATE AND ACETAMINOPHEN 1 TABLET: 7.5; 325 TABLET ORAL at 08:24

## 2023-01-17 RX ADMIN — ATORVASTATIN CALCIUM 10 MG: 20 TABLET, FILM COATED ORAL at 20:20

## 2023-01-17 RX ADMIN — SILDENAFIL CITRATE 20 MG: 20 TABLET ORAL at 05:33

## 2023-01-17 RX ADMIN — GABAPENTIN 600 MG: 600 TABLET, FILM COATED ORAL at 17:17

## 2023-01-17 RX ADMIN — SERTRALINE 50 MG: 50 TABLET, FILM COATED ORAL at 20:20

## 2023-01-17 RX ADMIN — FENTANYL CITRATE 25 MCG: 50 INJECTION INTRAMUSCULAR; INTRAVENOUS at 11:15

## 2023-01-17 RX ADMIN — FENTANYL CITRATE 25 MCG: 50 INJECTION INTRAMUSCULAR; INTRAVENOUS at 11:20

## 2023-01-17 RX ADMIN — HYDROCODONE BITARTRATE AND ACETAMINOPHEN 1 TABLET: 7.5; 325 TABLET ORAL at 20:20

## 2023-01-17 RX ADMIN — SENNOSIDES AND DOCUSATE SODIUM 2 TABLET: 50; 8.6 TABLET ORAL at 20:20

## 2023-01-17 RX ADMIN — LEVOTHYROXINE SODIUM 88 MCG: 0.09 TABLET ORAL at 05:33

## 2023-01-17 RX ADMIN — ONDANSETRON 4 MG: 2 INJECTION INTRAMUSCULAR; INTRAVENOUS at 16:02

## 2023-01-17 RX ADMIN — TIZANIDINE 4 MG: 4 TABLET ORAL at 00:35

## 2023-01-17 RX ADMIN — GABAPENTIN 600 MG: 600 TABLET, FILM COATED ORAL at 08:24

## 2023-01-17 RX ADMIN — SOTALOL HYDROCHLORIDE 80 MG: 80 TABLET ORAL at 17:17

## 2023-01-17 RX ADMIN — EPHEDRINE SULFATE 10 MG: 5 INJECTION INTRAVENOUS at 11:56

## 2023-01-17 RX ADMIN — SILDENAFIL CITRATE 20 MG: 20 TABLET ORAL at 14:38

## 2023-01-17 RX ADMIN — CEFAZOLIN 2 G: 2 INJECTION, POWDER, FOR SOLUTION INTRAMUSCULAR; INTRAVENOUS at 17:17

## 2023-01-17 RX ADMIN — PANTOPRAZOLE SODIUM 40 MG: 40 TABLET, DELAYED RELEASE ORAL at 14:38

## 2023-01-17 RX ADMIN — SODIUM CHLORIDE: 0.9 INJECTION, SOLUTION INTRAVENOUS at 11:13

## 2023-01-17 RX ADMIN — SILDENAFIL CITRATE 20 MG: 20 TABLET ORAL at 21:12

## 2023-01-17 RX ADMIN — Medication 10 ML: at 08:24

## 2023-01-17 RX ADMIN — EPHEDRINE SULFATE 10 MG: 5 INJECTION INTRAVENOUS at 12:04

## 2023-01-17 RX ADMIN — Medication 3 ML: at 10:27

## 2023-01-17 NOTE — PLAN OF CARE
Goal Outcome Evaluation:  Plan of Care Reviewed With: patient        Progress: no change  Outcome Evaluation: Pt has not been resting well this shift. pts feet have been restless and hurting. Pain meds given (see MAR). Pt has been NPO since midnight. VSS so far this shift.      Problem: Fall Injury Risk  Goal: Absence of Fall and Fall-Related Injury  1/17/2023 0347 by Veronica Gaxiola RN  Outcome: Ongoing, Progressing  1/17/2023 0347 by Veronica Gaxiola RN  Outcome: Ongoing, Progressing  1/17/2023 0346 by Veronica Gaxiola RN  Outcome: Ongoing, Progressing  1/17/2023 0345 by Veronica Gaxiola RN  Outcome: Ongoing, Progressing  1/17/2023 0329 by Veronica Gaxiola RN  Outcome: Ongoing, Progressing  1/17/2023 0329 by Veronica Gaxiola RN  Outcome: Ongoing, Progressing  Intervention: Identify and Manage Contributors  Recent Flowsheet Documentation  Taken 1/17/2023 0241 by Veronica Gaxiola RN  Medication Review/Management: medications reviewed  Taken 1/17/2023 0047 by Veronica Gaxiola RN  Medication Review/Management: medications reviewed  Self-Care Promotion: independence encouraged  Taken 1/16/2023 2235 by Veronica Gaxiola RN  Medication Review/Management: medications reviewed  Taken 1/16/2023 2050 by Veronica Gaxiola RN  Medication Review/Management: medications reviewed  Self-Care Promotion: independence encouraged  Intervention: Promote Injury-Free Environment  Recent Flowsheet Documentation  Taken 1/17/2023 0241 by Veronica Gaxiola RN  Safety Promotion/Fall Prevention:   activity supervised   assistive device/personal items within reach   clutter free environment maintained   nonskid shoes/slippers when out of bed   room organization consistent   safety round/check completed  Taken 1/17/2023 0047 by Veronica Gaxiola RN  Safety Promotion/Fall Prevention:   activity supervised   assistive device/personal items within reach   clutter free environment maintained   nonskid shoes/slippers when out of bed   room organization  consistent   safety round/check completed  Taken 1/16/2023 2235 by Veronica Gaxiola RN  Safety Promotion/Fall Prevention:   activity supervised   assistive device/personal items within reach   clutter free environment maintained   nonskid shoes/slippers when out of bed   room organization consistent   safety round/check completed  Taken 1/16/2023 2050 by Veronica Gaxiola RN  Safety Promotion/Fall Prevention:   activity supervised   assistive device/personal items within reach   clutter free environment maintained   nonskid shoes/slippers when out of bed   room organization consistent   safety round/check completed     Problem: Adult Inpatient Plan of Care  Goal: Plan of Care Review  1/17/2023 0347 by Veronica Gaxiola RN  Outcome: Ongoing, Progressing  Flowsheets  Taken 1/17/2023 0346  Progress: no change  Plan of Care Reviewed With: patient  Taken 1/17/2023 0329  Outcome Evaluation: Pt has not been resting well this shift. pts feet have been restless and hurting. Pain meds given (see MAR). Pt has been NPO since midnight. VSS so far this shift.  1/17/2023 0347 by Veronica Gaxiola RN  Outcome: Ongoing, Progressing  Flowsheets (Taken 1/17/2023 0329)  Outcome Evaluation: Pt has not been resting well this shift. pts feet have been restless and hurting. Pain meds given (see MAR). Pt has been NPO since midnight. VSS so far this shift.  1/17/2023 0346 by Veronica Gaxiola RN  Outcome: Ongoing, Progressing  Flowsheets  Taken 1/17/2023 0346  Progress: no change  Plan of Care Reviewed With: patient  Taken 1/17/2023 0329  Outcome Evaluation: Pt has not been resting well this shift. pts feet have been restless and hurting. Pain meds given (see MAR). Pt has been NPO since midnight. VSS so far this shift.  1/17/2023 0345 by Veronica Gaxiola RN  Outcome: Ongoing, Progressing  Flowsheets  Taken 1/17/2023 0345  Progress: no change  Taken 1/17/2023 0329  Outcome Evaluation: Pt has not been resting well this shift. pts feet have been  restless and hurting. Pain meds given (see MAR). Pt has been NPO since midnight. VSS so far this shift.  1/17/2023 0329 by Veronica Gaxiola RN  Outcome: Ongoing, Progressing  Flowsheets (Taken 1/17/2023 0329)  Progress: no change  Plan of Care Reviewed With: patient  Outcome Evaluation: Pt has not been resting well this shift. pts feet have been restless and hurting. Pain meds given (see MAR). Pt has been NPO since midnight. VSS so far this shift.  1/17/2023 0329 by Veronica Gaxiola RN  Outcome: Ongoing, Progressing  Flowsheets (Taken 1/17/2023 0329)  Progress: no change  Plan of Care Reviewed With: patient  Outcome Evaluation: Pt has not been resting well this shift. pts feet have been restless and hurting. Pain meds given (see MAR). Pt has been NPO since midnight. VSS so far this shift.  Goal: Patient-Specific Goal (Individualized)  1/17/2023 0347 by Veronica Gaxiola RN  Outcome: Ongoing, Progressing  1/17/2023 0347 by Veronica Gaxiola RN  Outcome: Ongoing, Progressing  1/17/2023 0346 by Veronica Gaxiola RN  Outcome: Ongoing, Progressing  1/17/2023 0345 by Veronica Gaxiola RN  Outcome: Ongoing, Progressing  1/17/2023 0329 by Veronica Gaxiola RN  Outcome: Ongoing, Progressing  1/17/2023 0329 by Veronica Gaxiola RN  Outcome: Ongoing, Progressing  Goal: Absence of Hospital-Acquired Illness or Injury  1/17/2023 0347 by Veronica Gaxiola RN  Outcome: Ongoing, Progressing  1/17/2023 0347 by Veronica Gaxiola RN  Outcome: Ongoing, Progressing  1/17/2023 0346 by Veronica Gaxiola RN  Outcome: Ongoing, Progressing  1/17/2023 0345 by Veronica Gaxiola RN  Outcome: Ongoing, Progressing  1/17/2023 0329 by Veronica Gaxiola RN  Outcome: Ongoing, Progressing  1/17/2023 0329 by Veronica Gaxiola, RN  Outcome: Ongoing, Progressing  Intervention: Identify and Manage Fall Risk  Recent Flowsheet Documentation  Taken 1/17/2023 0241 by Veronica Gaxiola, RN  Safety Promotion/Fall Prevention:   activity supervised   assistive device/personal  items within reach   clutter free environment maintained   nonskid shoes/slippers when out of bed   room organization consistent   safety round/check completed  Taken 1/17/2023 0047 by Veronica Gaxiola RN  Safety Promotion/Fall Prevention:   activity supervised   assistive device/personal items within reach   clutter free environment maintained   nonskid shoes/slippers when out of bed   room organization consistent   safety round/check completed  Taken 1/16/2023 2235 by Veronica Gaxiola RN  Safety Promotion/Fall Prevention:   activity supervised   assistive device/personal items within reach   clutter free environment maintained   nonskid shoes/slippers when out of bed   room organization consistent   safety round/check completed  Taken 1/16/2023 2050 by Veronica Gaxiola RN  Safety Promotion/Fall Prevention:   activity supervised   assistive device/personal items within reach   clutter free environment maintained   nonskid shoes/slippers when out of bed   room organization consistent   safety round/check completed  Intervention: Prevent Skin Injury  Recent Flowsheet Documentation  Taken 1/17/2023 0241 by Veronica Gaxiola RN  Body Position: position changed independently  Taken 1/17/2023 0047 by Veronica Gaxiola RN  Body Position: position changed independently  Skin Protection: adhesive use limited  Taken 1/16/2023 2235 by Veronica Gaxiola RN  Body Position: position changed independently  Taken 1/16/2023 2050 by Veronica Gaxiola RN  Body Position: position changed independently  Skin Protection: adhesive use limited  Intervention: Prevent and Manage VTE (Venous Thromboembolism) Risk  Recent Flowsheet Documentation  Taken 1/17/2023 0047 by Veronica Gaxiola RN  Activity Management:   activity adjusted per tolerance   activity encouraged  VTE Prevention/Management: patient refused intervention  Range of Motion: active ROM (range of motion) encouraged  Taken 1/16/2023 2050 by Veronica Gaxiola RN  Activity Management:    activity adjusted per tolerance   activity encouraged  VTE Prevention/Management: patient refused intervention  Range of Motion: active ROM (range of motion) encouraged  Intervention: Prevent Infection  Recent Flowsheet Documentation  Taken 1/17/2023 0241 by Veronica Gaxiola RN  Infection Prevention:   personal protective equipment utilized   hand hygiene promoted   rest/sleep promoted   single patient room provided   environmental surveillance performed  Taken 1/17/2023 0047 by Veronica Gaxiola RN  Infection Prevention:   hand hygiene promoted   rest/sleep promoted   single patient room provided   personal protective equipment utilized   environmental surveillance performed  Taken 1/16/2023 2235 by Veronica Gaxiola RN  Infection Prevention:   personal protective equipment utilized   hand hygiene promoted   rest/sleep promoted   single patient room provided   environmental surveillance performed  Taken 1/16/2023 2050 by Veronica Gaxiola RN  Infection Prevention:   hand hygiene promoted   personal protective equipment utilized   rest/sleep promoted   single patient room provided   environmental surveillance performed  Goal: Optimal Comfort and Wellbeing  1/17/2023 0347 by Veronica Gaxiola RN  Outcome: Ongoing, Progressing  1/17/2023 0347 by Veronica Gaxiola RN  Outcome: Ongoing, Progressing  1/17/2023 0346 by Veronica Gaxiola RN  Outcome: Ongoing, Progressing  1/17/2023 0345 by Veroncia Gaxiola RN  Outcome: Ongoing, Progressing  1/17/2023 0329 by Veronica Gaxiola RN  Outcome: Ongoing, Progressing  1/17/2023 0329 by Veronica Gaxiola RN  Outcome: Ongoing, Progressing  Intervention: Monitor Pain and Promote Comfort  Recent Flowsheet Documentation  Taken 1/16/2023 2150 by Veronica Gaxiola RN  Pain Management Interventions: see MAR  Taken 1/16/2023 2050 by Veronica Gaxiola RN  Pain Management Interventions: pain management plan reviewed with patient/caregiver  Intervention: Provide Person-Centered Care  Recent Flowsheet  Documentation  Taken 1/17/2023 0047 by Veronica Gaxiola RN  Trust Relationship/Rapport: care explained  Taken 1/16/2023 2050 by Veronica Gaxiola RN  Trust Relationship/Rapport: care explained  Goal: Readiness for Transition of Care  1/17/2023 0347 by Veronica Gaxiola RN  Outcome: Ongoing, Progressing  1/17/2023 0347 by Veronica Gaxiola RN  Outcome: Ongoing, Progressing  1/17/2023 0346 by Veronica Gaxiola RN  Outcome: Ongoing, Progressing  1/17/2023 0345 by Veronica Gaxiola RN  Outcome: Ongoing, Progressing  1/17/2023 0329 by Veronica Gaxiola RN  Outcome: Ongoing, Progressing  1/17/2023 0329 by Veronica Gaxiola RN  Outcome: Ongoing, Progressing     Problem: Asthma Comorbidity  Goal: Maintenance of Asthma Control  1/17/2023 0347 by Veronica Gaxiola RN  Outcome: Ongoing, Progressing  1/17/2023 0347 by Veronica Gaxiola RN  Outcome: Ongoing, Progressing  1/17/2023 0346 by Veronica Gaxiola RN  Outcome: Ongoing, Progressing  1/17/2023 0345 by Veronica Gaxiola RN  Outcome: Ongoing, Progressing  1/17/2023 0329 by Veronica Gaxiola RN  Outcome: Ongoing, Progressing  1/17/2023 0329 by Veronica Gaxiola RN  Outcome: Ongoing, Progressing  Intervention: Maintain Asthma Symptom Control  Recent Flowsheet Documentation  Taken 1/17/2023 0241 by Veronica Gaxiola RN  Medication Review/Management: medications reviewed  Taken 1/17/2023 0047 by Veronica Gaxiola RN  Medication Review/Management: medications reviewed  Taken 1/16/2023 2235 by Veronica Gaxiola RN  Medication Review/Management: medications reviewed  Taken 1/16/2023 2050 by Veronica Gaxiola RN  Medication Review/Management: medications reviewed     Problem: Behavioral Health Comorbidity  Goal: Maintenance of Behavioral Health Symptom Control  1/17/2023 0347 by Veronica Gaxiola, BRENDAN  Outcome: Ongoing, Progressing  1/17/2023 0347 by Veronica Gaxiola, BRENDAN  Outcome: Ongoing, Progressing  1/17/2023 0346 by Veronica Gaxiola, BRENDAN  Outcome: Ongoing, Progressing  1/17/2023 0345 by Veronica Gaxiola  N, RN  Outcome: Ongoing, Progressing  1/17/2023 0329 by Veronica Gaxiola RN  Outcome: Ongoing, Progressing  1/17/2023 0329 by Veronica Gaxiola RN  Outcome: Ongoing, Progressing  Intervention: Maintain Behavioral Health Symptom Control  Recent Flowsheet Documentation  Taken 1/17/2023 0241 by Veronica Gaxiola RN  Medication Review/Management: medications reviewed  Taken 1/17/2023 0047 by Veronica Gaxiola RN  Medication Review/Management: medications reviewed  Taken 1/16/2023 2235 by Veronica Gaxiola RN  Medication Review/Management: medications reviewed  Taken 1/16/2023 2050 by Veronica Gaxiola RN  Medication Review/Management: medications reviewed     Problem: COPD (Chronic Obstructive Pulmonary Disease) Comorbidity  Goal: Maintenance of COPD Symptom Control  1/17/2023 0347 by Veronica Gaxiola RN  Outcome: Ongoing, Progressing  1/17/2023 0347 by Veronica Gaxiola RN  Outcome: Ongoing, Progressing  1/17/2023 0346 by Veronica Gaxiola RN  Outcome: Ongoing, Progressing  1/17/2023 0345 by Veronica Gaxiola RN  Outcome: Ongoing, Progressing  1/17/2023 0329 by Veronica Gaxiola RN  Outcome: Ongoing, Progressing  1/17/2023 0329 by Veronica Gaxiola RN  Outcome: Ongoing, Progressing  Intervention: Maintain COPD-Symptom Control  Recent Flowsheet Documentation  Taken 1/17/2023 0241 by Veronica Gaxiola RN  Medication Review/Management: medications reviewed  Taken 1/17/2023 0047 by Veronica Gaxiola RN  Supportive Measures: active listening utilized  Medication Review/Management: medications reviewed  Taken 1/16/2023 2235 by Veronica Gaxiola RN  Medication Review/Management: medications reviewed  Taken 1/16/2023 2050 by Veronica Gaxiola RN  Supportive Measures: active listening utilized  Medication Review/Management: medications reviewed     Problem: Diabetes Comorbidity  Goal: Blood Glucose Level Within Targeted Range  1/17/2023 0347 by Veronica Gaxiola RN  Outcome: Ongoing, Progressing  1/17/2023 0347 by Veronica Gaxiola,  RN  Outcome: Ongoing, Progressing  1/17/2023 0346 by Veronica Gaxiola RN  Outcome: Ongoing, Progressing  1/17/2023 0345 by Veronica Gaxiola RN  Outcome: Ongoing, Progressing  1/17/2023 0329 by Veronica Gaxiola RN  Outcome: Ongoing, Progressing  1/17/2023 0329 by Veronica Gaxiola RN  Outcome: Ongoing, Progressing     Problem: Heart Failure Comorbidity  Goal: Maintenance of Heart Failure Symptom Control  1/17/2023 0347 by Veronica Gaxiola RN  Outcome: Ongoing, Progressing  1/17/2023 0347 by Veronica Gaxiola RN  Outcome: Ongoing, Progressing  1/17/2023 0346 by Veronica Gaxiola RN  Outcome: Ongoing, Progressing  1/17/2023 0345 by Veronica Gaxiola RN  Outcome: Ongoing, Progressing  1/17/2023 0329 by Veornica Gaxiola RN  Outcome: Ongoing, Progressing  1/17/2023 0329 by Veronica Gaxiola RN  Outcome: Ongoing, Progressing  Intervention: Maintain Heart Failure-Management  Recent Flowsheet Documentation  Taken 1/17/2023 0241 by Veronica Gaxiola RN  Medication Review/Management: medications reviewed  Taken 1/17/2023 0047 by Veronica Gaxiola RN  Medication Review/Management: medications reviewed  Taken 1/16/2023 2235 by Veronica Gaxiola RN  Medication Review/Management: medications reviewed  Taken 1/16/2023 2050 by Veronica Gaxiola RN  Medication Review/Management: medications reviewed     Problem: Hypertension Comorbidity  Goal: Blood Pressure in Desired Range  1/17/2023 0347 by Veronica Gaxiola RN  Outcome: Ongoing, Progressing  1/17/2023 0347 by Veronica Gaxiola RN  Outcome: Ongoing, Progressing  1/17/2023 0346 by Veronica Gaxiola RN  Outcome: Ongoing, Progressing  1/17/2023 0345 by Veronica Gaxiola RN  Outcome: Ongoing, Progressing  1/17/2023 0329 by Harl, Veronica N, RN  Outcome: Ongoing, Progressing  1/17/2023 0329 by Veronica Gaxiola RN  Outcome: Ongoing, Progressing  Intervention: Maintain Blood Pressure Management  Recent Flowsheet Documentation  Taken 1/17/2023 0241 by Veronica Gaxiola, RN  Medication Review/Management:  medications reviewed  Taken 1/17/2023 0047 by Veronica Gaxiola RN  Medication Review/Management: medications reviewed  Taken 1/16/2023 2235 by Veronica Gaxiola RN  Medication Review/Management: medications reviewed  Taken 1/16/2023 2050 by Veronica Gaxiola RN  Medication Review/Management: medications reviewed     Problem: Obstructive Sleep Apnea Risk or Actual Comorbidity Management  Goal: Unobstructed Breathing During Sleep  1/17/2023 0347 by Veronica Gaxiola RN  Outcome: Ongoing, Progressing  1/17/2023 0347 by Veronica Gaxiola RN  Outcome: Ongoing, Progressing  1/17/2023 0346 by Veronica Gaxiola RN  Outcome: Ongoing, Progressing  1/17/2023 0345 by Veronica Gaxiola RN  Outcome: Ongoing, Progressing  1/17/2023 0329 by Veronica Gaxiola RN  Outcome: Ongoing, Progressing  1/17/2023 0329 by Veronica Gaxiola RN  Outcome: Ongoing, Progressing     Problem: Osteoarthritis Comorbidity  Goal: Maintenance of Osteoarthritis Symptom Control  1/17/2023 0347 by Veronica Gaxiola RN  Outcome: Ongoing, Progressing  1/17/2023 0347 by Veronica Gaxiola RN  Outcome: Ongoing, Progressing  1/17/2023 0346 by Veronica Gaxiola RN  Outcome: Ongoing, Progressing  1/17/2023 0345 by Veronica Gaxiola RN  Outcome: Ongoing, Progressing  1/17/2023 0329 by Veronica Gaxiola RN  Outcome: Ongoing, Progressing  1/17/2023 0329 by Veronica Gaxiola RN  Outcome: Ongoing, Progressing  Intervention: Maintain Osteoarthritis Symptom Control  Recent Flowsheet Documentation  Taken 1/17/2023 0241 by Veronica Gaxiola RN  Medication Review/Management: medications reviewed  Taken 1/17/2023 0047 by Veronica Gaxiola RN  Activity Management:   activity adjusted per tolerance   activity encouraged  Assistive Device Utilized: walker  Medication Review/Management: medications reviewed  Taken 1/16/2023 2235 by Veronica Gaxiola RN  Medication Review/Management: medications reviewed  Taken 1/16/2023 2050 by Veronica Gaxiola, RN  Activity Management:   activity adjusted per  tolerance   activity encouraged  Assistive Device Utilized: walker  Medication Review/Management: medications reviewed     Problem: Pain Chronic (Persistent) (Comorbidity Management)  Goal: Acceptable Pain Control and Functional Ability  1/17/2023 0347 by Veronica Gaxiola RN  Outcome: Ongoing, Progressing  1/17/2023 0347 by Veronica Gaxiola RN  Outcome: Ongoing, Progressing  1/17/2023 0346 by Veronica Gaxiola RN  Outcome: Ongoing, Progressing  1/17/2023 0345 by Veronica Gaxiola RN  Outcome: Ongoing, Progressing  1/17/2023 0329 by Veronica Gaxiola RN  Outcome: Ongoing, Progressing  1/17/2023 0329 by Veronica Gaxiola RN  Outcome: Ongoing, Progressing  Intervention: Manage Persistent Pain  Recent Flowsheet Documentation  Taken 1/17/2023 0241 by Veronica Gaxiola RN  Medication Review/Management: medications reviewed  Taken 1/17/2023 0047 by Veronica Gaxiola RN  Medication Review/Management: medications reviewed  Taken 1/16/2023 2235 by Veronica Gaxiola RN  Medication Review/Management: medications reviewed  Taken 1/16/2023 2050 by Veronica Gaxiola RN  Sleep/Rest Enhancement:   awakenings minimized   relaxation techniques promoted  Medication Review/Management: medications reviewed  Intervention: Develop Pain Management Plan  Recent Flowsheet Documentation  Taken 1/16/2023 2150 by Veronica Gaxiola RN  Pain Management Interventions: see MAR  Taken 1/16/2023 2050 by Veronica Gaxiola RN  Pain Management Interventions: pain management plan reviewed with patient/caregiver  Intervention: Optimize Psychosocial Wellbeing  Recent Flowsheet Documentation  Taken 1/17/2023 0047 by Veronica Gaxiola RN  Supportive Measures: active listening utilized  Diversional Activities: television  Family/Support System Care: self-care encouraged  Taken 1/16/2023 2050 by Veronica Gaxiola RN  Supportive Measures: active listening utilized  Diversional Activities: television  Family/Support System Care: self-care encouraged     Problem: Seizure  Disorder Comorbidity  Goal: Maintenance of Seizure Control  1/17/2023 0347 by Veronica Gaxiola RN  Outcome: Ongoing, Progressing  1/17/2023 0347 by Veronica Gaxiola RN  Outcome: Ongoing, Progressing  1/17/2023 0346 by Veronica Gaxiola RN  Outcome: Ongoing, Progressing  1/17/2023 0345 by Veronica Gaxiola RN  Outcome: Ongoing, Progressing  1/17/2023 0329 by Veronica Gaxiola RN  Outcome: Ongoing, Progressing  1/17/2023 0329 by Veronica Gaxiola RN  Outcome: Ongoing, Progressing     Problem: Skin Injury Risk Increased  Goal: Skin Health and Integrity  1/17/2023 0347 by Veronica Gaxiola RN  Outcome: Ongoing, Progressing  1/17/2023 0347 by Veronica Gaxiola RN  Outcome: Ongoing, Progressing  1/17/2023 0346 by Veronica Gaxiola RN  Outcome: Ongoing, Progressing  1/17/2023 0345 by Veronica Gaxiola RN  Outcome: Ongoing, Progressing  1/17/2023 0329 by Veronica Gaxiola RN  Outcome: Ongoing, Progressing  1/17/2023 0329 by Veronica Gaxiola RN  Outcome: Ongoing, Progressing  Intervention: Optimize Skin Protection  Recent Flowsheet Documentation  Taken 1/17/2023 0241 by Veronica Gaxiola RN  Head of Bed (HOB) Positioning: HOB at 20-30 degrees  Taken 1/17/2023 0047 by Veronica Gaxiola RN  Pressure Reduction Techniques: frequent weight shift encouraged  Head of Bed (HOB) Positioning: HOB at 20-30 degrees  Pressure Reduction Devices: pressure-redistributing mattress utilized  Skin Protection: adhesive use limited  Taken 1/16/2023 2235 by Veronica Gaxiola RN  Head of Bed (HOB) Positioning: HOB at 20-30 degrees  Taken 1/16/2023 2050 by Veronica Gaxiola RN  Pressure Reduction Techniques: frequent weight shift encouraged  Head of Bed (HOB) Positioning: HOB at 30-45 degrees  Pressure Reduction Devices: pressure-redistributing mattress utilized  Skin Protection: adhesive use limited

## 2023-01-17 NOTE — PROGRESS NOTES
Referring Provider: Hospitalist    Reason for follow-up: Atrial fibrillation     Patient Care Team:  Rubio Dover MD as PCP - General (Family Medicine)  Flash Gonzalez MD as Consulting Physician (Cardiology)    Subjective .  Patient is doing well without any symptoms today.    Objective  Lying in bed comfortably.  Waiting for pacemaker placement     Review of Systems   Constitutional: Negative for fever and malaise/fatigue.   HENT: Negative for ear pain and nosebleeds.    Eyes: Negative for blurred vision and double vision.   Cardiovascular: Negative for chest pain, dyspnea on exertion and palpitations.   Respiratory: Negative for cough and shortness of breath.    Skin: Negative for rash.   Musculoskeletal: Negative for joint pain.   Gastrointestinal: Negative for abdominal pain, nausea and vomiting.   Neurological: Negative for focal weakness and headaches.   Psychiatric/Behavioral: Negative for depression. The patient is not nervous/anxious.    All other systems reviewed and are negative.      Baclofen, Codeine, Ibuprofen, Methocarbamol, Naproxen, Tizanidine hcl, and Tolmetin    Scheduled Meds:[MAR Hold] atorvastatin, 10 mg, Oral, Nightly  gabapentin, 600 mg, Oral, 4x Daily  [MAR Hold] levothyroxine, 88 mcg, Oral, Q AM  [MAR Hold] lidocaine, 2 patch, Transdermal, Q24H  [MAR Hold] pantoprazole, 40 mg, Oral, Daily With Lunch  [MAR Hold] rOPINIRole, 0.25 mg, Oral, Nightly  [MAR Hold] senna-docusate sodium, 2 tablet, Oral, BID  [MAR Hold] sertraline, 50 mg, Oral, Nightly  [MAR Hold] sildenafil, 20 mg, Oral, Q8H  [MAR Hold] sodium chloride, 10 mL, Intravenous, Q12H  sodium chloride, 3 mL, Intravenous, Q12H      Continuous Infusions:   PRN Meds:.•  [MAR Hold] acetaminophen **OR** [MAR Hold] acetaminophen **OR** [MAR Hold] acetaminophen  •  [MAR Hold] senna-docusate sodium **AND** [MAR Hold] polyethylene glycol **AND** [MAR Hold] bisacodyl **AND** [MAR Hold] bisacodyl  •  [MAR Hold] guaifenesin  •  [MAR Hold]  "HYDROcodone-acetaminophen  •  iopamidol  •  [MAR Hold] loperamide  •  [MAR Hold] magnesium sulfate **OR** [MAR Hold] magnesium sulfate **OR** [MAR Hold] magnesium sulfate  •  [MAR Hold] melatonin  •  [MAR Hold] ondansetron  •  [MAR Hold] Pharmacy Consult  •  [MAR Hold] potassium chloride **OR** [MAR Hold] potassium chloride  •  [COMPLETED] Insert Peripheral IV **AND** [MAR Hold] sodium chloride  •  [MAR Hold] sodium chloride  •  sodium chloride  •  [MAR Hold] sodium chloride  •  sodium chloride  •  [MAR Hold] tiZANidine        VITAL SIGNS  Vitals:    01/16/23 2137 01/17/23 0127 01/17/23 0531 01/17/23 0841   BP: 121/41 142/60 106/53 109/57   BP Location: Right arm Right arm Right arm    Patient Position: Lying Sitting Lying    Pulse: 69 72 59 58   Resp: 14 15 11    Temp: 97.8 °F (36.6 °C) 97.7 °F (36.5 °C) 97.3 °F (36.3 °C)    TempSrc: Oral Oral Oral    SpO2: 98% 95% 90%    Weight:   74.6 kg (164 lb 7.4 oz)    Height:           Flowsheet Rows    Flowsheet Row First Filed Value   Admission Height 160 cm (63\") Documented at 01/14/2023 1927   Admission Weight 79.4 kg (175 lb) Documented at 01/14/2023 1927           TELEMETRY: Sinus bradycardia with PACs    Physical Exam:  Constitutional:       Appearance: Well-developed.   Eyes:      General: No scleral icterus.     Conjunctiva/sclera: Conjunctivae normal.      Pupils: Pupils are equal, round, and reactive to light.   HENT:      Head: Normocephalic and atraumatic.   Neck:      Vascular: No carotid bruit or JVD.   Pulmonary:      Effort: Pulmonary effort is normal.      Breath sounds: Normal breath sounds. No wheezing. No rales.   Cardiovascular:      Normal rate. Regular rhythm.   Pulses:     Intact distal pulses.   Abdominal:      General: Bowel sounds are normal.      Palpations: Abdomen is soft.   Musculoskeletal: Normal range of motion.      Cervical back: Normal range of motion and neck supple. Skin:     General: Skin is warm and dry.      Findings: No rash. "   Neurological:      Mental Status: Alert.      Comments: No focal deficits          Results Review:   I reviewed the patient's new clinical results.  Lab Results (last 24 hours)     Procedure Component Value Units Date/Time    Manual Differential [[id number]]  (Abnormal) Collected: 01/17/23 0157    Specimen: Blood Updated: 01/17/23 0307     Neutrophil % 46.0 %      Lymphocyte % 36.0 %      Monocyte % 10.0 %      Eosinophil % 2.0 %      Basophil % 2.0 %      Bands %  2.0 %      Atypical Lymphocyte % 2.0 %      Neutrophils Absolute 2.35 10*3/mm3      Lymphocytes Absolute 1.86 10*3/mm3      Monocytes Absolute 0.49 10*3/mm3      Eosinophils Absolute 0.10 10*3/mm3      Basophils Absolute 0.10 10*3/mm3      Anisocytosis Slight/1+     Dacrocytes Slight/1+     Microcytes Slight/1+     Poikilocytes Slight/1+     WBC Morphology Normal     Platelet Estimate Adequate    CBC & Differential [[id number]]  (Abnormal) Collected: 01/17/23 0157    Specimen: Blood Updated: 01/17/23 0307    Narrative:      The following orders were created for panel order CBC & Differential.  Procedure                               Abnormality         Status                     ---------                               -----------         ------                     CBC Auto Differential[[id number]]        Abnormal            Final result               Scan Slide[[id number]]                                       Final result                 Please view results for these tests on the individual orders.    Scan Slide [[id number]] Collected: 01/17/23 0157    Specimen: Blood Updated: 01/17/23 0307     Scan Slide --     Comment: See Manual Differential Results       CBC Auto Differential [[id number]]  (Abnormal) Collected: 01/17/23 0157    Specimen: Blood Updated: 01/17/23 0307     WBC 4.90 10*3/mm3      RBC 3.65 10*6/mm3      Hemoglobin 9.4 g/dL      Hematocrit 30.7 %      MCV 84.3 fL      MCH 25.9 pg      MCHC 30.7 g/dL      RDW 18.0 %      RDW-SD 53.8 fl      MPV  8.7 fL      Platelets 184 10*3/mm3     Narrative:      The previously reported component NRBC is no longer being reported. Previous result was 0.2 /100 WBC (Reference Range: 0.0-0.2 /100 WBC) on 1/17/2023 at 0222 EST.    Magnesium [[id number]]  (Normal) Collected: 01/17/23 0157    Specimen: Blood Updated: 01/17/23 0241     Magnesium 2.2 mg/dL     Basic Metabolic Panel [[id number]]  (Abnormal) Collected: 01/17/23 0157    Specimen: Blood Updated: 01/17/23 0231     Glucose 113 mg/dL      BUN 21 mg/dL      Creatinine 0.93 mg/dL      Sodium 137 mmol/L      Potassium 5.2 mmol/L      Comment: Slight hemolysis detected by analyzer. Results may be affected.        Chloride 106 mmol/L      CO2 23.0 mmol/L      Calcium 7.7 mg/dL      BUN/Creatinine Ratio 22.6     Anion Gap 8.0 mmol/L      eGFR 61.9 mL/min/1.73      Comment: National Kidney Foundation and American Society of Nephrology (ASN) Task Force recommended calculation based on the Chronic Kidney Disease Epidemiology Collaboration (CKD-EPI) equation refit without adjustment for race.       Narrative:      GFR Normal >60  Chronic Kidney Disease <60  Kidney Failure <15    The GFR formula is only valid for adults with stable renal function between ages 18 and 70.          Imaging Results (Last 24 Hours)     ** No results found for the last 24 hours. **          EKG      I personally viewed and interpreted the patient's EKG/Telemetry data:    ECHOCARDIOGRAM:    STRESS MYOVIEW:    CARDIAC CATHETERIZATION:    OTHER:         Assessment & Plan     Principal Problem:    Atrial fibrillation with RVR ([facility])  Active Problems:    Depressive disorder    Hypothyroidism    Stage 3a chronic kidney disease (HCC)    Chronic low back pain    Generalized anxiety disorder    Polyneuropathy, unspecified    Restless legs syndrome    Paroxysmal atrial fibrillation (HCC)    Essential (primary) hypertension    Pulmonary hypertension, unspecified (HCC)    Chronic systolic CHF (congestive heart  failure) (HCC)    Coronary artery disease involving native coronary artery of native heart without angina pectoris    Moderate mitral regurgitation    Class 1 obesity due to excess calories with serious comorbidity and body mass index (BMI) of 30.0 to 30.9 in adult    Hypotension    Sick sinus syndrome (HCC)       Patient presented with the palpitations and shortness of breath and had atrial fibrillation with rapid response  Patient was seen yesterday by my colleague and was placed on Tikosyn  Patient converted to sinus rhythm significant bradycardia and asymptomatic.  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  Patient is seen by electrophysiologist and is having a pacemaker done today  Patient will be resumed on Tikosyn and Eliquis after the pacemaker is done per her electrophysiologist.    I discussed the patients findings and my recommendations with patient and nurse    Flash Gonzalez MD  01/17/23  11:33 EST

## 2023-01-17 NOTE — CASE MANAGEMENT/SOCIAL WORK
Continued Stay Note  [facility]     Patient Name: Catalina Moreno  MRN: [id number]  Today's Date: 1/17/2023    Admit Date: 1/14/2023    Plan: DC Plan: Declined SNF. Anticipate routine home with [facility] (current, [facility] order per MD). Watch O2 needs. Watch for possible transport needs at d/c. Tikosyn cost $4.80/month, Eliquis cost $38.70/month.   Discharge Plan     Row Name 01/17/23 1242       Plan    Plan DC Plan: Declined SNF. Anticipate routine home with [facility] (current, [facility] order per MD). Watch O2 needs. Watch for possible transport needs at d/c. Tikosyn cost $4.80/month, Eliquis cost $38.70/month.    Plan Comments CM contacted [facility] to test claim cost of Eliquis. Cost would be $38.70 per month. DC Barriers: Pacemaker placement.              Phone communication or documentation only - no physical contact with patient or family.    Kathy Celeste RN     Office Phone: [phone]  Office Cell: [phone]

## 2023-01-17 NOTE — ANESTHESIA POSTPROCEDURE EVALUATION
Patient: Catalina Moreno    Procedure Summary     Date: 01/17/23 Room / Location: [facility] CATH LAB 3 / [facility] CATH INVASIVE LOCATION    Anesthesia Start: 1113 Anesthesia Stop: 1247    Procedure: Pacemaker DC new Diagnosis:       Atrial fibrillation with RVR (HCC)      Chronic systolic CHF (congestive heart failure) (HCC)      Sick sinus syndrome (HCC)      (Sick sinus syndrome with atrial fibrillation with LV dysfunction)    Providers: Baljeet Valero MD Provider: Lazaro Vargas MD    Anesthesia Type: MAC ASA Status: 3          Anesthesia Type: MAC    Vitals  Vitals Value Taken Time   /64 01/17/23 1316   Temp     Pulse 70 01/17/23 1320   Resp 10 01/17/23 1255   SpO2 91 % 01/17/23 1320   Vitals shown include unvalidated device data.        Post Anesthesia Care and Evaluation    Patient location during evaluation: PACU  Patient participation: complete - patient participated  Level of consciousness: awake  Pain scale: See nurse's notes for pain score.  Pain management: adequate    Airway patency: patent  Anesthetic complications: No anesthetic complications  PONV Status: none  Cardiovascular status: acceptable  Respiratory status: acceptable  Hydration status: acceptable    Comments: Patient seen and examined postoperatively; vital signs stable; SpO2 greater than or equal to 90%; cardiopulmonary status stable; nausea/vomiting adequately controlled; pain adequately controlled; no apparent anesthesia complications; patient discharged from anesthesia care when discharge criteria were met

## 2023-01-17 NOTE — SIGNIFICANT NOTE
01/17/23 0848   OTHER   Discipline physical therapist   Rehab Time/Intention   Session Not Performed other (see comments)  (jamesaker placement this date, will f/u tomorrow as able)   Recommendation   PT - Next Appointment 01/18/23

## 2023-01-17 NOTE — PROGRESS NOTES
[facility]     Progress Note    Patient Name: Catalina Moreno  : 1940  MRN: [id number]  Primary Care Physician:  Rubio Dover MD  Date of admission: 2023    Subjective   Subjective     Chief Complaint: Palpitations, weakness    Patient seen after returning from PPM placement.  Patient just reports feeling very fatigued and tired today, still with some nausea no vomiting today.  Patient denies any palpitations, chest pain, lightheadedness, fevers or chills.    Review of Systems  Negative excepts as mentioned above    Objective   Objective     Vitals:   Temp:  [97.3 °F (36.3 °C)-98 °F (36.7 °C)] 97.3 °F (36.3 °C)  Heart Rate:  [58-72] 70  Resp:  [10-16] 10  BP: ()/(41-68) 170/57  Flow (L/min):  [1] 1    Physical Exam  Constitutional:       General: She is not in acute distress.     Appearance: Normal appearance. She is not ill-appearing.   HENT:      Mouth/Throat:      Mouth: Mucous membranes are dry.   Eyes:      Extraocular Movements: Extraocular movements intact.      Pupils: Pupils are equal, round, and reactive to light.   Cardiovascular:      Rate and Rhythm: Normal rate. Rhythm irregular.   Pulmonary:      Effort: Pulmonary effort is normal. No respiratory distress.      Breath sounds: No wheezing or rales.   Abdominal:      General: There is no distension.      Palpations: Abdomen is soft.      Tenderness: There is no abdominal tenderness.   Neurological:      General: No focal deficit present.      Mental Status: She is alert and oriented to person, place, and time.          Result Review    Result Review:  I have personally reviewed the results from the time of this admission to 2023 14:29 EST and agree with these findings:  [x]  Laboratory list / accordion  []  Microbiology  []  Radiology  []  EKG/Telemetry   []  Cardiology/Vascular   []  Pathology  []  Old records  []  Other:  Most notable findings include:     Assessment & Plan   Assessment / Plan     Brief  Patient Summary:  Catalina Moreno is a 82 y.o. female who is admitted for atrial fibrillation with RVR    Active Hospital Problems:  Active Hospital Problems    Diagnosis    • **Atrial fibrillation with RVR ([facility])    • Class 1 obesity due to excess calories with serious comorbidity and body mass index (BMI) of 30.0 to 30.9 in adult    • Hypotension    • Sick sinus syndrome ([facility])    • Moderate mitral regurgitation    • Chronic systolic CHF (congestive heart failure) ([facility])    • Restless legs syndrome    • Generalized anxiety disorder    • Pulmonary hypertension, unspecified ([facility])    • Paroxysmal atrial fibrillation ([facility])    • Essential (primary) hypertension    • Polyneuropathy, unspecified    • Coronary artery disease involving native coronary artery of native heart without angina pectoris    • Stage 3a chronic kidney disease ([facility])    • Chronic low back pain    • Depressive disorder    • Hypothyroidism      Plan:     Atrial fibrillation with RVR  -Status post pacemaker placement on 1/17  -Continue digoxin  -Patient has been started on sotalol  -Plan for PPM placement  -Continuous cardiac monitoring  -Continue Eliquis  -Cardiology recommendation     Chronic systolic heart failure  Pulmonary hypertension   -EF 36 to 40% on 1/5/2023  -Continue home sildenafil     Essential Hypertension  -Controlled  -Monitor BP  -Continue home metoprolol succinate     Hyperlipidemia  -Continue home atorvastatin     Chronic pain  -Stable  -Continue home Neurontin, Zanaflex, Norco (inspect verified)  -Continue home Lidoderm patch     Depression  -Chronic, stable  -Continue home Zoloft      Hypothyroidism  -Continue home levothyroxine     Restless leg syndrome  -Continue home Requip    DVT prophylaxis:  Medical DVT prophylaxis orders are present.    CODE STATUS:    Code Status (Patient has no pulse and is not breathing): CPR (Attempt to Resuscitate)  Medical Interventions (Patient has pulse or is breathing): Full Support    Disposition:   I expect patient to be discharged early next week.    Zack Cifuentes MD

## 2023-01-17 NOTE — PLAN OF CARE
Goal Outcome Evaluation:           Progress: no change  Outcome Evaluation: Pt had pacemaker placed today and tolerated well. No new complaints during shift. will continue to monitor

## 2023-01-17 NOTE — ANESTHESIA PREPROCEDURE EVALUATION
Anesthesia Evaluation     Patient summary reviewed and Nursing notes reviewed   history of anesthetic complications: PONV  NPO Solid Status: > 8 hours  NPO Liquid Status: > 8 hours           Airway   Mallampati: II  TM distance: >3 FB  Neck ROM: full  No difficulty expected  Dental - normal exam     Pulmonary - normal exam   Cardiovascular - normal exam    ECG reviewed    (+) hypertension, valvular problems/murmurs, CAD, dysrhythmias Atrial Fib, CHF ,       Neuro/Psych  (+) numbness, psychiatric history,    GI/Hepatic/Renal/Endo    (+)  GERD,  renal disease, thyroid problem     Musculoskeletal     (+) myalgias,   Abdominal  - normal exam    Bowel sounds: normal.   Substance History      OB/GYN          Other   arthritis,      ROS/Med Hx Other: Sinus bradycardia  Prolonged RI interval  Nonspecific T abnrm, anterolateral leads    •  Left ventricular ejection fraction appears to be 36 - 40%.  •  There is calcification of the aortic valve.  •  Moderate mitral valve regurgitation is present.  •  Estimated right ventricular systolic pressure from tricuspid regurgitation is normal (<35 mmHg).  •  No pericardial effusion noted      IMPRESSION:  Impression:  Stable chronic findings without acute process.                Anesthesia Plan    ASA 3     MAC     intravenous induction     Anesthetic plan, risks, benefits, and alternatives have been provided, discussed and informed consent has been obtained with: patient.        CODE STATUS:    Code Status (Patient has no pulse and is not breathing): CPR (Attempt to Resuscitate)  Medical Interventions (Patient has pulse or is breathing): Full Support

## 2023-01-17 NOTE — CASE MANAGEMENT/SOCIAL WORK
Continued Stay Note  [facility]     Patient Name: Catalina Moreno  MRN: [id number]  Today's Date: 1/17/2023    Admit Date: 1/14/2023    Plan: DC Plan: Declined SNF. Anticipate routine home ([facility] following, to admit once established with [facility] provider). Watch O2 needs and transport needs at d/c. Tikosyn cost $4.80/month, Eliquis cost $38.70/month.   Discharge Plan     Row Name 01/17/23 1546       Plan    Plan DC Plan: Declined SNF. Anticipate routine home ([facility] following, to admit once established with [facility] provider). Watch O2 needs and transport needs at d/c. Tikosyn cost $4.80/month, Eliquis cost $38.70/month.    Plan Comments Discussed with [facility] liaison, April that she has been working on setting patient up with HH services since her previous admission. Reported that patient is not seeing PCP Stanislaw and office reported not accepting new patients. April reported that NP from [facility] is scheduled to admit patient who reported she could not leave home for MD appts. [facility] reported that they can admit patient once NP is able to see patient since they need primary care provider to sign HH orders.              Phone communication or documentation only - no physical contact with patient or family.    Katyh Celeste RN     Office Phone: [phone]  Office Cell: [phone]

## 2023-01-17 NOTE — PHARMACY RECOMMENDATION
"Transitions-of-Care ([facility]) Pharmacy Future COST Assessment:     /Nurse requesting test claim: [facility]    Pharmacy ran test claim for the following:     Drug Sig Covered/PA required Patient Copay per month   Eliquis (apixaban) 5 mg bid Covered without PA $ 38.70 per month     Patient Insurance Type: Medicare - this means they are NOT eligible for a monthly discount card in the future (see \"free month\" note below)    Deductible/Medicare Gap Issue? Unknown    Is patient signed up for M2B service? No    Is above drug(s) eligible for 1 month free through M2B service? Yes - free coupon is available   If eligible,  or [facility] pharmacy can provide coupon upon request.           For billing questions, reach out to Retail Pharmacy at [phone]      Marti Fajardo, PharmD   1/17/2023 12:33 EST  "

## 2023-01-18 LAB
MAGNESIUM SERPL-MCNC: 2.1 MG/DL (ref 1.6–2.4)
MAGNESIUM SERPL-MCNC: 2.2 MG/DL (ref 1.6–2.4)
POTASSIUM SERPL-SCNC: 4.4 MMOL/L (ref 3.5–5.2)
POTASSIUM SERPL-SCNC: 4.9 MMOL/L (ref 3.5–5.2)

## 2023-01-18 PROCEDURE — 36415 COLL VENOUS BLD VENIPUNCTURE: CPT | Performed by: INTERNAL MEDICINE

## 2023-01-18 PROCEDURE — 99232 SBSQ HOSP IP/OBS MODERATE 35: CPT | Performed by: NURSE PRACTITIONER

## 2023-01-18 PROCEDURE — 97162 PT EVAL MOD COMPLEX 30 MIN: CPT

## 2023-01-18 PROCEDURE — 99232 SBSQ HOSP IP/OBS MODERATE 35: CPT | Performed by: INTERNAL MEDICINE

## 2023-01-18 PROCEDURE — 84132 ASSAY OF SERUM POTASSIUM: CPT | Performed by: INTERNAL MEDICINE

## 2023-01-18 PROCEDURE — 83735 ASSAY OF MAGNESIUM: CPT | Performed by: INTERNAL MEDICINE

## 2023-01-18 PROCEDURE — 93010 ELECTROCARDIOGRAM REPORT: CPT | Performed by: INTERNAL MEDICINE

## 2023-01-18 PROCEDURE — 25010000002 ONDANSETRON PER 1 MG: Performed by: INTERNAL MEDICINE

## 2023-01-18 PROCEDURE — 93005 ELECTROCARDIOGRAM TRACING: CPT | Performed by: INTERNAL MEDICINE

## 2023-01-18 PROCEDURE — 93005 ELECTROCARDIOGRAM TRACING: CPT | Performed by: STUDENT IN AN ORGANIZED HEALTH CARE EDUCATION/TRAINING PROGRAM

## 2023-01-18 PROCEDURE — 25010000002 CEFAZOLIN PER 500 MG: Performed by: INTERNAL MEDICINE

## 2023-01-18 RX ORDER — CEPHALEXIN 500 MG/1
500 CAPSULE ORAL 3 TIMES DAILY
Qty: 21 CAPSULE | Refills: 0 | Status: SHIPPED | OUTPATIENT
Start: 2023-01-18 | End: 2023-01-25

## 2023-01-18 RX ADMIN — SILDENAFIL CITRATE 20 MG: 20 TABLET ORAL at 05:03

## 2023-01-18 RX ADMIN — LEVOTHYROXINE SODIUM 88 MCG: 0.09 TABLET ORAL at 05:03

## 2023-01-18 RX ADMIN — HYDROCODONE BITARTRATE AND ACETAMINOPHEN 1 TABLET: 7.5; 325 TABLET ORAL at 05:03

## 2023-01-18 RX ADMIN — CEFAZOLIN 2 G: 2 INJECTION, POWDER, FOR SOLUTION INTRAMUSCULAR; INTRAVENOUS at 03:53

## 2023-01-18 RX ADMIN — DIGOXIN 125 MCG: 125 TABLET ORAL at 13:25

## 2023-01-18 RX ADMIN — APIXABAN 5 MG: 5 TABLET, FILM COATED ORAL at 08:32

## 2023-01-18 RX ADMIN — SILDENAFIL CITRATE 20 MG: 20 TABLET ORAL at 20:50

## 2023-01-18 RX ADMIN — GABAPENTIN 600 MG: 600 TABLET, FILM COATED ORAL at 17:04

## 2023-01-18 RX ADMIN — SOTALOL HYDROCHLORIDE 80 MG: 80 TABLET ORAL at 08:32

## 2023-01-18 RX ADMIN — Medication 10 ML: at 20:47

## 2023-01-18 RX ADMIN — SENNOSIDES AND DOCUSATE SODIUM 2 TABLET: 50; 8.6 TABLET ORAL at 20:46

## 2023-01-18 RX ADMIN — PANTOPRAZOLE SODIUM 40 MG: 40 TABLET, DELAYED RELEASE ORAL at 13:25

## 2023-01-18 RX ADMIN — SILDENAFIL CITRATE 20 MG: 20 TABLET ORAL at 13:25

## 2023-01-18 RX ADMIN — GABAPENTIN 600 MG: 600 TABLET, FILM COATED ORAL at 08:32

## 2023-01-18 RX ADMIN — SENNOSIDES AND DOCUSATE SODIUM 2 TABLET: 50; 8.6 TABLET ORAL at 08:32

## 2023-01-18 RX ADMIN — SERTRALINE 50 MG: 50 TABLET, FILM COATED ORAL at 20:46

## 2023-01-18 RX ADMIN — HYDROCODONE BITARTRATE AND ACETAMINOPHEN 1 TABLET: 7.5; 325 TABLET ORAL at 20:46

## 2023-01-18 RX ADMIN — GABAPENTIN 600 MG: 600 TABLET, FILM COATED ORAL at 20:46

## 2023-01-18 RX ADMIN — TIZANIDINE 4 MG: 4 TABLET ORAL at 13:31

## 2023-01-18 RX ADMIN — ONDANSETRON 4 MG: 2 INJECTION INTRAMUSCULAR; INTRAVENOUS at 08:32

## 2023-01-18 RX ADMIN — GABAPENTIN 600 MG: 600 TABLET, FILM COATED ORAL at 13:25

## 2023-01-18 RX ADMIN — HYDROCODONE BITARTRATE AND ACETAMINOPHEN 1 TABLET: 7.5; 325 TABLET ORAL at 10:24

## 2023-01-18 RX ADMIN — ROPINIROLE HYDROCHLORIDE 0.25 MG: 0.25 TABLET, FILM COATED ORAL at 20:46

## 2023-01-18 RX ADMIN — SOTALOL HYDROCHLORIDE 80 MG: 80 TABLET ORAL at 20:46

## 2023-01-18 RX ADMIN — APIXABAN 5 MG: 5 TABLET, FILM COATED ORAL at 20:46

## 2023-01-18 RX ADMIN — Medication 10 ML: at 08:32

## 2023-01-18 RX ADMIN — ATORVASTATIN CALCIUM 10 MG: 20 TABLET, FILM COATED ORAL at 20:46

## 2023-01-18 NOTE — PLAN OF CARE
Goal Outcome Evaluation:  Plan of Care Reviewed With: patient        Progress: improving     s/p pacer. pt agreeable to rehab. CM made aware; status pending. VSS. No complaints at this time.       Problem: Fall Injury Risk  Goal: Absence of Fall and Fall-Related Injury  Outcome: Ongoing, Progressing  Intervention: Identify and Manage Contributors  Recent Flowsheet Documentation  Taken 1/18/2023 1800 by Kimberly Izquierdo RN  Medication Review/Management: medications reviewed  Taken 1/18/2023 1615 by Kimberly Izquierdo RN  Medication Review/Management: medications reviewed  Taken 1/18/2023 1400 by Kimberly Izquierdo RN  Medication Review/Management: medications reviewed  Taken 1/18/2023 1215 by Kimberly Izquierdo RN  Medication Review/Management: medications reviewed  Taken 1/18/2023 0815 by Kimberly Izquierdo RN  Medication Review/Management: medications reviewed  Intervention: Promote Injury-Free Environment  Recent Flowsheet Documentation  Taken 1/18/2023 1800 by Kimberly Izquierdo RN  Safety Promotion/Fall Prevention:   safety round/check completed   room organization consistent   nonskid shoes/slippers when out of bed   fall prevention program maintained   clutter free environment maintained   assistive device/personal items within reach  Taken 1/18/2023 1615 by Kimberly Izquierdo RN  Safety Promotion/Fall Prevention:   safety round/check completed   room organization consistent   nonskid shoes/slippers when out of bed   fall prevention program maintained   clutter free environment maintained   assistive device/personal items within reach  Taken 1/18/2023 1400 by Kimberly Izquierdo RN  Safety Promotion/Fall Prevention:   safety round/check completed   room organization consistent   nonskid shoes/slippers when out of bed   fall prevention program maintained   clutter free environment maintained   assistive device/personal items within reach  Taken 1/18/2023 1215 by Kimberly Izquierdo  RN  Safety Promotion/Fall Prevention:   safety round/check completed   room organization consistent   nonskid shoes/slippers when out of bed   fall prevention program maintained   clutter free environment maintained   assistive device/personal items within reach  Taken 1/18/2023 0815 by Kimberly Izquierdo RN  Safety Promotion/Fall Prevention:   safety round/check completed   room organization consistent   nonskid shoes/slippers when out of bed   fall prevention program maintained   clutter free environment maintained   assistive device/personal items within reach     Problem: Adult Inpatient Plan of Care  Goal: Plan of Care Review  Outcome: Ongoing, Progressing  Flowsheets (Taken 1/18/2023 1856)  Progress: improving  Plan of Care Reviewed With: patient  Goal: Patient-Specific Goal (Individualized)  Outcome: Ongoing, Progressing  Goal: Absence of Hospital-Acquired Illness or Injury  Outcome: Ongoing, Progressing  Intervention: Identify and Manage Fall Risk  Recent Flowsheet Documentation  Taken 1/18/2023 1800 by Kimberly Izquierdo RN  Safety Promotion/Fall Prevention:   safety round/check completed   room organization consistent   nonskid shoes/slippers when out of bed   fall prevention program maintained   clutter free environment maintained   assistive device/personal items within reach  Taken 1/18/2023 1615 by Kimberly Izquierdo RN  Safety Promotion/Fall Prevention:   safety round/check completed   room organization consistent   nonskid shoes/slippers when out of bed   fall prevention program maintained   clutter free environment maintained   assistive device/personal items within reach  Taken 1/18/2023 1400 by Kimberly Izquierdo RN  Safety Promotion/Fall Prevention:   safety round/check completed   room organization consistent   nonskid shoes/slippers when out of bed   fall prevention program maintained   clutter free environment maintained   assistive device/personal items within reach  Taken  1/18/2023 1215 by Kimberly Izquierdo RN  Safety Promotion/Fall Prevention:   safety round/check completed   room organization consistent   nonskid shoes/slippers when out of bed   fall prevention program maintained   clutter free environment maintained   assistive device/personal items within reach  Taken 1/18/2023 0815 by Kimberly Izquierdo RN  Safety Promotion/Fall Prevention:   safety round/check completed   room organization consistent   nonskid shoes/slippers when out of bed   fall prevention program maintained   clutter free environment maintained   assistive device/personal items within reach  Intervention: Prevent Skin Injury  Recent Flowsheet Documentation  Taken 1/18/2023 1800 by Kimberly Izquierdo RN  Body Position:   sitting up in bed   weight shifting  Taken 1/18/2023 1615 by Kimberly Izquierdo RN  Body Position: supine, legs elevated  Skin Protection:   adhesive use limited   tubing/devices free from skin contact  Taken 1/18/2023 1400 by Kimberly Izquierdo RN  Body Position:   side-lying   right  Taken 1/18/2023 1215 by Kimberly Izquierdo RN  Body Position: legs elevated  Skin Protection:   adhesive use limited   tubing/devices free from skin contact  Taken 1/18/2023 0815 by Kimberly Izquierdo RN  Body Position:   position changed independently   weight shifting  Skin Protection:   adhesive use limited   tubing/devices free from skin contact  Intervention: Prevent and Manage VTE (Venous Thromboembolism) Risk  Recent Flowsheet Documentation  Taken 1/18/2023 1800 by Kimberly Izquierdo RN  Activity Management:   activity adjusted per tolerance   up to bedside commode  Taken 1/18/2023 1615 by Kimberly Izquierdo RN  Activity Management:   activity adjusted per tolerance   up to bedside commode  Taken 1/18/2023 1400 by Kimberly Izquierdo RN  Activity Management:   activity adjusted per tolerance   up ad christofer   up to bedside commode   ambulated in room   ambulated outside room    back to bed  Taken 1/18/2023 1215 by Kimberly Izquierdo RN  Activity Management:   activity adjusted per tolerance   up in chair  Taken 1/18/2023 0815 by Kimberly Izquierdo RN  Activity Management: activity adjusted per tolerance  VTE Prevention/Management: (dorsiflexion) bilateral  Range of Motion: active ROM (range of motion) encouraged  Intervention: Prevent Infection  Recent Flowsheet Documentation  Taken 1/18/2023 1800 by Kimberly Izquierdo RN  Infection Prevention:   single patient room provided   personal protective equipment utilized   hand hygiene promoted  Taken 1/18/2023 1615 by Kimberly Izquierdo RN  Infection Prevention:   single patient room provided   personal protective equipment utilized   hand hygiene promoted  Taken 1/18/2023 1400 by Kimberly Izquierdo RN  Infection Prevention:   single patient room provided   personal protective equipment utilized   hand hygiene promoted  Taken 1/18/2023 0815 by Kimberly Izquierdo RN  Infection Prevention:   single patient room provided   personal protective equipment utilized   hand hygiene promoted  Goal: Optimal Comfort and Wellbeing  Outcome: Ongoing, Progressing  Intervention: Monitor Pain and Promote Comfort  Recent Flowsheet Documentation  Taken 1/18/2023 1615 by Kimberly Izquierdo RN  Pain Management Interventions:   care clustered   pain management plan reviewed with patient/caregiver   pillow support provided   position adjusted   quiet environment facilitated   relaxation techniques promoted  Taken 1/18/2023 1215 by Kimberly Izquierdo RN  Pain Management Interventions:   care clustered   pain management plan reviewed with patient/caregiver   pillow support provided   position adjusted   quiet environment facilitated   relaxation techniques promoted  Taken 1/18/2023 0815 by Kimberly Izquierdo RN  Pain Management Interventions:   care clustered   pain management plan reviewed with patient/caregiver   pillow support provided    position adjusted   quiet environment facilitated   relaxation techniques promoted  Intervention: Provide Person-Centered Care  Recent Flowsheet Documentation  Taken 1/18/2023 1615 by Kimberly Izquierdo RN  Trust Relationship/Rapport:   care explained   questions answered   questions encouraged  Taken 1/18/2023 1215 by Kimberly Izquierdo RN  Trust Relationship/Rapport:   care explained   questions answered   questions encouraged  Taken 1/18/2023 0815 by Kimberly Izquierdo RN  Trust Relationship/Rapport:   care explained   questions encouraged   questions answered  Goal: Readiness for Transition of Care  Outcome: Ongoing, Progressing     Problem: Asthma Comorbidity  Goal: Maintenance of Asthma Control  Outcome: Ongoing, Progressing  Intervention: Maintain Asthma Symptom Control  Recent Flowsheet Documentation  Taken 1/18/2023 1800 by Kimberly Izquierdo RN  Medication Review/Management: medications reviewed  Taken 1/18/2023 1615 by Kimberly Izquierdo RN  Medication Review/Management: medications reviewed  Taken 1/18/2023 1400 by Kimberly Izquierdo RN  Medication Review/Management: medications reviewed  Taken 1/18/2023 1215 by Kimberly Izquierdo RN  Medication Review/Management: medications reviewed  Taken 1/18/2023 0815 by Kimberly Izquierdo RN  Medication Review/Management: medications reviewed     Problem: Behavioral Health Comorbidity  Goal: Maintenance of Behavioral Health Symptom Control  Outcome: Ongoing, Progressing  Intervention: Maintain Behavioral Health Symptom Control  Recent Flowsheet Documentation  Taken 1/18/2023 1800 by Kimberly Izquierdo RN  Medication Review/Management: medications reviewed  Taken 1/18/2023 1615 by Kimberly Izquierdo RN  Medication Review/Management: medications reviewed  Taken 1/18/2023 1400 by Kimberly Izquierdo RN  Medication Review/Management: medications reviewed  Taken 1/18/2023 1215 by Kimberly Izquierdo RN  Medication Review/Management:  medications reviewed  Taken 1/18/2023 0815 by Kimberly Izquierdo RN  Medication Review/Management: medications reviewed     Problem: COPD (Chronic Obstructive Pulmonary Disease) Comorbidity  Goal: Maintenance of COPD Symptom Control  Outcome: Ongoing, Progressing  Intervention: Maintain COPD-Symptom Control  Recent Flowsheet Documentation  Taken 1/18/2023 1800 by Kimberly Izquierdo RN  Medication Review/Management: medications reviewed  Taken 1/18/2023 1615 by Kimberly Izquierdo RN  Supportive Measures: active listening utilized  Medication Review/Management: medications reviewed  Taken 1/18/2023 1400 by Kimberly Izquierdo RN  Medication Review/Management: medications reviewed  Taken 1/18/2023 1215 by Kimberly Izquierdo RN  Supportive Measures: active listening utilized  Medication Review/Management: medications reviewed  Taken 1/18/2023 0815 by Kimberly Izquierdo RN  Supportive Measures:   active listening utilized   self-care encouraged   self-reflection promoted  Medication Review/Management: medications reviewed     Problem: Diabetes Comorbidity  Goal: Blood Glucose Level Within Targeted Range  Outcome: Ongoing, Progressing     Problem: Heart Failure Comorbidity  Goal: Maintenance of Heart Failure Symptom Control  Outcome: Ongoing, Progressing  Intervention: Maintain Heart Failure-Management  Recent Flowsheet Documentation  Taken 1/18/2023 1800 by Kimberly Izquierdo RN  Medication Review/Management: medications reviewed  Taken 1/18/2023 1615 by Kimberly Izquierdo RN  Medication Review/Management: medications reviewed  Taken 1/18/2023 1400 by Kimberly Izquierdo RN  Medication Review/Management: medications reviewed  Taken 1/18/2023 1215 by Kimberly Izquierdo RN  Medication Review/Management: medications reviewed  Taken 1/18/2023 0815 by Kimberly Izquierdo RN  Medication Review/Management: medications reviewed     Problem: Hypertension Comorbidity  Goal: Blood Pressure in Desired  Range  Outcome: Ongoing, Progressing  Intervention: Maintain Blood Pressure Management  Recent Flowsheet Documentation  Taken 1/18/2023 1800 by Kimberly Izquierdo RN  Medication Review/Management: medications reviewed  Taken 1/18/2023 1615 by Kimberly Izquierdo RN  Syncope Management: position changed slowly  Medication Review/Management: medications reviewed  Taken 1/18/2023 1400 by Kimberly Izquierdo RN  Medication Review/Management: medications reviewed  Taken 1/18/2023 1215 by Kimberly Izquierdo RN  Syncope Management: position changed slowly  Medication Review/Management: medications reviewed  Taken 1/18/2023 0815 by Kimberly Izquierdo RN  Syncope Management: position changed slowly  Medication Review/Management: medications reviewed     Problem: Obstructive Sleep Apnea Risk or Actual Comorbidity Management  Goal: Unobstructed Breathing During Sleep  Outcome: Ongoing, Progressing     Problem: Osteoarthritis Comorbidity  Goal: Maintenance of Osteoarthritis Symptom Control  Outcome: Ongoing, Progressing  Intervention: Maintain Osteoarthritis Symptom Control  Recent Flowsheet Documentation  Taken 1/18/2023 1800 by Kimberly Izquierdo RN  Activity Management:   activity adjusted per tolerance   up to bedside commode  Medication Review/Management: medications reviewed  Taken 1/18/2023 1615 by Kimberly Izquierdo RN  Activity Management:   activity adjusted per tolerance   up to bedside commode  Medication Review/Management: medications reviewed  Taken 1/18/2023 1400 by Kimberly Izquierdo RN  Activity Management:   activity adjusted per tolerance   up ad christofer   up to bedside commode   ambulated in room   ambulated outside room   back to bed  Medication Review/Management: medications reviewed  Taken 1/18/2023 1215 by Kimberly Izquierdo RN  Activity Management:   activity adjusted per tolerance   up in chair  Medication Review/Management: medications reviewed  Taken 1/18/2023 0815 by Timbo  BRENDAN Harris  Activity Management: activity adjusted per tolerance  Medication Review/Management: medications reviewed     Problem: Pain Chronic (Persistent) (Comorbidity Management)  Goal: Acceptable Pain Control and Functional Ability  Outcome: Ongoing, Progressing  Intervention: Manage Persistent Pain  Recent Flowsheet Documentation  Taken 1/18/2023 1800 by Kimberly Izquierdo RN  Medication Review/Management: medications reviewed  Taken 1/18/2023 1615 by Kimberly Izquierdo RN  Sleep/Rest Enhancement:   consistent schedule promoted   relaxation techniques promoted  Medication Review/Management: medications reviewed  Taken 1/18/2023 1400 by Kimberly Izquierdo RN  Medication Review/Management: medications reviewed  Taken 1/18/2023 1215 by Kimberly Izquierdo RN  Sleep/Rest Enhancement: consistent schedule promoted  Medication Review/Management: medications reviewed  Taken 1/18/2023 0815 by Kimberly Izquierdo RN  Sleep/Rest Enhancement: consistent schedule promoted  Medication Review/Management: medications reviewed  Intervention: Develop Pain Management Plan  Recent Flowsheet Documentation  Taken 1/18/2023 1615 by Kimberly Izquierdo RN  Pain Management Interventions:   care clustered   pain management plan reviewed with patient/caregiver   pillow support provided   position adjusted   quiet environment facilitated   relaxation techniques promoted  Taken 1/18/2023 1215 by Kimberly Izquierdo RN  Pain Management Interventions:   care clustered   pain management plan reviewed with patient/caregiver   pillow support provided   position adjusted   quiet environment facilitated   relaxation techniques promoted  Taken 1/18/2023 0815 by Kimberly Izquierdo RN  Pain Management Interventions:   care clustered   pain management plan reviewed with patient/caregiver   pillow support provided   position adjusted   quiet environment facilitated   relaxation techniques promoted  Intervention: Optimize  Psychosocial Wellbeing  Recent Flowsheet Documentation  Taken 1/18/2023 1615 by Kimberly Izquierdo RN  Supportive Measures: active listening utilized  Diversional Activities: television  Family/Support System Care:   self-care encouraged   support provided  Taken 1/18/2023 1215 by Kimberly Izquierdo RN  Supportive Measures: active listening utilized  Diversional Activities: television  Family/Support System Care:   self-care encouraged   support provided  Taken 1/18/2023 0815 by Kimberly Izquierdo RN  Supportive Measures:   active listening utilized   self-care encouraged   self-reflection promoted  Diversional Activities: television  Family/Support System Care:   self-care encouraged   support provided     Problem: Seizure Disorder Comorbidity  Goal: Maintenance of Seizure Control  Outcome: Ongoing, Progressing     Problem: Skin Injury Risk Increased  Goal: Skin Health and Integrity  Outcome: Ongoing, Progressing  Intervention: Optimize Skin Protection  Recent Flowsheet Documentation  Taken 1/18/2023 1800 by Kimberly Izquierdo RN  Head of Bed (HOB) Positioning: HOB elevated  Taken 1/18/2023 1615 by Kimberly Izquierdo RN  Pressure Reduction Techniques: frequent weight shift encouraged  Head of Bed (HOB) Positioning: HOB elevated  Pressure Reduction Devices: pressure-redistributing mattress utilized  Skin Protection:   adhesive use limited   tubing/devices free from skin contact  Taken 1/18/2023 1400 by Kimberly Izquierdo RN  Head of Bed (HOB) Positioning: HOB elevated  Taken 1/18/2023 1215 by Kimberly Izquierdo RN  Pressure Reduction Techniques: frequent weight shift encouraged  Head of Bed (HOB) Positioning: HOB lowered  Pressure Reduction Devices: pressure-redistributing mattress utilized  Skin Protection:   adhesive use limited   tubing/devices free from skin contact  Taken 1/18/2023 0815 by Kimberly Izquierdo RN  Pressure Reduction Techniques: frequent weight shift encouraged  Head of Bed  ([facility]) Positioning: [facility] elevated  Pressure Reduction Devices: pressure-redistributing mattress utilized  Skin Protection:   adhesive use limited   tubing/devices free from skin contact

## 2023-01-18 NOTE — CASE MANAGEMENT/SOCIAL WORK
Continued Stay Note  [facility]     Patient Name: Catalina Moreno  MRN: [id number]  Today's Date: 1/18/2023    Admit Date: 1/14/2023    Plan: DC Plan: Accepted to [facility] (no precert required, PASRR per facility) vs. home with HH ([facility] following, to admit once established with [facility] provider). Watch transport needs at d/c. Tikosyn cost $4.80/month, Eliquis cost $38.70/month.   Discharge Plan     Row Name 01/18/23 1630       Plan    Plan DC Plan: Accepted to [facility] (no precert required, PASRR per facility) vs. home with HH ([facility] following, to admit once established with [facility] provider). Watch transport needs at d/c. Tikosyn cost $4.80/month, Eliquis cost $38.70/month.    Patient/Family in Agreement with Plan yes    Provided Post Acute Provider List? Yes    Post Acute Provider List Nursing Home    Provided Post Acute Provider Quality & Resource List? Yes    Post Acute Provider Quality and Resource List Nursing Home    Delivered To Patient    Method of Delivery In person    Plan Comments CM met with patient at bedside to discuss dc planning and IMM letter. Patient reported that she was agreeable to go to rehab if someone could see if her son would be able to care for her dog. She reported that her first choice would be [facility] in [place] and second choice would be [facility]. CM sent new referral in [facility] to  and to liaisonTawanna. Liaison confirmed acceptance and reported that bed would be available 1/19. She reported that patient asked for private room and one would be available Friday. Reported that patient could still come 1/19 and would get private room on 1/20. CM contacted patient’s son, Jordan ([phone]). He reported that he would be home tomorrow but he is a  and is gone 4 days out of the week. CM inquired who was caring for dog while patient is in hospital and he reported that it was a neighbor or someone on his  stepfather’s side of the family. Per MSSW, Boarding at [facility], [facility], and possible [facility] would be possible options to arrange if needed if someone was able to get patient’s pet there and pay. Discussed with PT that their recommendation would be home with HH. DONELL discussed with [facility] liaison who reported that [facility] can still take her if patient chooses. CM met with renaldotent at bedside to discuss her preferences and she reported that she had a bath and was too tired to discuss. She requested for CM to check back tomorrow, 1/19 after she is able to think about it.              Met with patient in room wearing PPE: mask.      Maintained distance greater than six feet and spent less than 15 minutes in the room.      Kathy Celeste RN     Office Phone: [phone]  Office Cell: [phone]

## 2023-01-18 NOTE — PLAN OF CARE
Goal Outcome Evaluation:  Plan of Care Reviewed With: patient           Outcome Evaluation: Pt is an 81 YO F, s/p pacemaker placement this date. Pt lives home alone, typically independent with all ADLs, ambulation with RWx and no recent falls. Pt states she has limited support at home as her son is gone for work most of the time. Pt with pacemaker precautions and educated on them, tolerates ambulating with HHA fair, ambluating approx 30 feet, but demonstrates impaired balance. PT reached out to Dr. Costello about potential of using LUE for RWx as that is pt baseline function, to which he was agreeable. PT returned to pt and with RWx pt ambulated 110 feet with SBA. Pt ambulating safely with RWx and appears to be safe to return home with HHPT at d/c.

## 2023-01-18 NOTE — THERAPY EVALUATION
Patient Name: Catalina Moreno  : 1940    MRN: [id number]                              Today's Date: 2023       Admit Date: 2023    Visit Dx:     ICD-10-CM ICD-9-CM   1. Atrial fibrillation with RVR ([facility])  I48.91 427.31   2. Weakness  R53.1 780.79   3. Class 1 obesity due to excess calories with serious comorbidity and body mass index (BMI) of 30.0 to 30.9 in adult  E66.09 278.00    Z68.30 V85.30   4. Chronic systolic CHF (congestive heart failure) ([facility])  I50.22 428.22     428.0   5. Sick sinus syndrome ([facility])  I49.5 427.81     Patient Active Problem List   Diagnosis   • Arthritis   • Other long term (current) drug therapy   • Polyarthralgia   • Sacroiliitis ([facility])   • Depressive disorder   • Primary fibromyalgia syndrome   • Lightheadedness   • Hypothyroidism   • Lumbar radiculopathy   • Nonrheumatic tricuspid valve regurgitation   • Atrial fibrillation with RVR ([facility])   • Acquired spondylolisthesis of lumbosacral region   • Vitamin D deficiency   • Stage 3a chronic kidney disease ([facility])   • DDD (degenerative disc disease), cervical   • Chronic low back pain   • Cervical stenosis of spinal canal   • Anemia   • Polypharmacy   • Age-related cognitive decline   • Edema, unspecified   • Generalized anxiety disorder   • History of falling   • Insomnia, unspecified   • Need for assistance with personal care   • Other dysphagia   • Polyneuropathy, unspecified   • Unsteadiness on feet   • Restless legs syndrome   • Paroxysmal atrial fibrillation ([facility])   • Chronic pain disorder   • Other intervertebral disc degeneration, lumbar region   • Essential (primary) hypertension   • Spondylolisthesis, lumbar region   • Gastro-esophageal reflux disease without esophagitis   • Pulmonary hypertension, unspecified ([facility])   • Chronic systolic CHF (congestive heart failure) ([facility])   • Coronary artery disease involving native coronary artery of native heart without angina pectoris   • Moderate mitral regurgitation   • Class 1  obesity due to excess calories with serious comorbidity and body mass index (BMI) of 30.0 to 30.9 in adult   • Hypotension   • Sick sinus syndrome ([facility])     Past Medical History:   Diagnosis Date   • Acute kidney injury ([facility]) 07/22/2022   • Anxiety    • Arthritis    • Atrial fibrillation ([facility])     paroxysmal   • Broken shoulder     left-- due to fall 11-7-19 was at [facility]   • Chronic systolic CHF (congestive heart failure) ([facility]) 01/05/2023    EF 36-40%   • Coronary artery disease involving native coronary artery of native heart without angina pectoris 04/02/2021    nonobstructive   • DDD (degenerative disc disease), lumbar    • Depression    • Edema     9/2020 foot   • GERD (gastroesophageal reflux disease)    • H/O fall    • Heart failure with mid-range ejection fraction ([facility]) 03/05/2022    EF 45% per 2D echo   • Hypertension    • Hypothyroidism    • Insomnia    • Low back pain    • Moderate mitral regurgitation 1/5/2023   • Neuropathy    • Pain in both feet    • PONV (postoperative nausea and vomiting)    • Pulmonary hypertension ([facility])    • Radiculopathy    • Restless legs    • Spondylolisthesis    • Stage 3a chronic kidney disease ([facility])    • Urinary incontinence      Past Surgical History:   Procedure Laterality Date   • BACK SURGERY  07/19/2018    PDC &  PSF L3-L5 insitu   • CARDIAC CATHETERIZATION N/A 4/2/2021    Procedure: Cardiac Catheterization/Vascular Study;  Surgeon: Barrett Evans MD;  Location: [facility] CATH INVASIVE LOCATION;  Service: Cardiovascular;  Laterality: N/A;   • CARDIAC ELECTROPHYSIOLOGY PROCEDURE N/A 1/17/2023    Procedure: Pacemaker DC new;  Surgeon: Baljeet Valero MD;  Location: [facility] CATH INVASIVE LOCATION;  Service: Cardiovascular;  Laterality: N/A;   • CHOLECYSTECTOMY     • COLONOSCOPY     • HYSTERECTOMY     • ROTATOR CUFF REPAIR Left       General Information     Row Name 01/18/23 1530          Physical Therapy Time and Intention    Document Type evaluation  -EL      Mode of Treatment individual therapy;physical therapy  -[facility] Name 01/18/23 1530          General Information    Patient Profile Reviewed yes  -EL     Prior Level of Function independent:;all household mobility;ADL's;driving  -[facility] Name 01/18/23 1530          Living Environment    People in Home alone  -[facility] Name 01/18/23 1530          Home Main Entrance    Number of Stairs, Main Entrance none  -EL     Row Name 01/18/23 1530          Stairs Within Home, Primary    Number of Stairs, Within Home, Primary none  -[facility] Name 01/18/23 1530          Cognition    Orientation Status (Cognition) oriented x 4  -[facility] Name 01/18/23 1530          Safety Issues, Functional Mobility    Impairments Affecting Function (Mobility) range of motion (ROM);balance  -EL           User Key  (r) = Recorded By, (t) = Taken By, (c) = Cosigned By    Initials Name Provider Type    Vidal Barrientos PT Physical Therapist               Mobility     [facility] Name 01/18/23 1530          Bed Mobility    Bed Mobility supine-sit  -EL     Supine-Sit [place] (Bed Mobility) contact guard  -EL     Assistive Device (Bed Mobility) bed rails  -[facility] Name 01/18/23 1530          Bed-Chair Transfer    Bed-Chair [place] (Transfers) contact guard  -EL     Assistive Device (Bed-Chair Transfers) --  HHA  -[facility] 01/18/23 1530          Sit-Stand Transfer    Sit-Stand [place] (Transfers) contact guard  -EL     Assistive Device (Sit-Stand Transfers) --  HHA initially, second attempt without physical assistance  -EL     Row Name 01/18/23 1530          Gait/Stairs (Locomotion)    [place] Level (Gait) standby assist  -EL     Assistive Device (Gait) walker, front-wheeled  initially 30 feet with HHA. Balance significantly improved with RWx  -EL     Distance in Feet (Gait) 110  -EL     Deviations/Abnormal Patterns (Gait) gait speed decreased  -EL           User Key  (r) = Recorded By, (t) = Taken By, (c) = Cosigned By     Initials Name Provider Type    Vidal Barrientos, PT Physical Therapist               Obj/Interventions     Row Name 01/18/23 1532          Range of Motion Comprehensive    General Range of Motion bilateral lower extremity ROM WFL  -EL     Row Name 01/18/23 1532          Strength Comprehensive (MMT)    General Manual Muscle Testing (MMT) Assessment lower extremity strength deficits identified  -EL     Comment, General Manual Muscle Testing (MMT) Assessment BLE 4/5 gross  -EL     Row Name 01/18/23 1532          Balance    Balance Assessment sitting static balance;sit to stand dynamic balance;standing static balance;standing dynamic balance  -EL     Static Sitting Balance independent  -EL     Sit to Stand Dynamic Balance contact guard  -EL     Static Standing Balance contact guard  -EL     Dynamic Standing Balance standby assist  -EL     Position/Device Used, Standing Balance walker, front-wheeled  -EL           User Key  (r) = Recorded By, (t) = Taken By, (c) = Cosigned By    Initials Name Provider Type    Vidal Barrientos, PT Physical Therapist               Goals/Plan     Row Name 01/18/23 1558          Bed Mobility Goal 1 (PT)    Activity/Assistive Device (Bed Mobility Goal 1, PT) bed mobility activities, all  -EL     [place] Level/Cues Needed (Bed Mobility Goal 1, PT) modified independence  -EL     Time Frame (Bed Mobility Goal 1, PT) long term goal (LTG);2 weeks  -EL     Row Name 01/18/23 1558          Transfer Goal 1 (PT)    Activity/Assistive Device (Transfer Goal 1, PT) transfers, all;walker, rolling  -EL     [place] Level/Cues Needed (Transfer Goal 1, PT) modified independence  -EL     Time Frame (Transfer Goal 1, PT) long term goal (LTG);2 weeks  -EL     Row Name 01/18/23 1553          Gait Training Goal 1 (PT)    Activity/Assistive Device (Gait Training Goal 1, PT) gait (walking locomotion);walker, rolling  -EL     [place] Level (Gait Training Goal 1, PT) modified independence  -EL     Distance  (Gait Training Goal 1, PT) 300  -EL     Time Frame (Gait Training Goal 1, PT) 2 weeks;long term goal (LTG)  -     Row Name 01/18/23 2400          Therapy Assessment/Plan (PT)    Planned Therapy Interventions (PT) balance training;neuromuscular re-education;transfer training;bed mobility training;patient/family education;gait training;strengthening  -EL           User Key  (r) = Recorded By, (t) = Taken By, (c) = Cosigned By    Initials Name Provider Type    Vidal Barrientos, PT Physical Therapist               Clinical Impression     Row Name 01/18/23 3877          Pain    Additional Documentation Pain Scale: FACES Pre/Post-Treatment (Group)  -     Row Name 01/18/23 0414          Pain Scale: FACES Pre/Post-Treatment    Pain: FACES Scale, Pretreatment 4-->hurts little more  -EL     Posttreatment Pain Rating 4-->hurts little more  -EL     Pain Location - chest  -EL     Pre/Posttreatment Pain Comment pacemaker site  -     Row Name 01/18/23 0330          Plan of Care Review    Plan of Care Reviewed With patient  -EL     Outcome Evaluation Pt is an 81 YO F, s/p pacemaker placement this date. Pt lives home alone, typically independent with all ADLs, ambulation with RWx and no recent falls. Pt states she has limited support at home as her son is gone for work most of the time. Pt with pacemaker precautions and educated on them, tolerates ambulating with HHA fair, ambluating approx 30 feet, but demonstrates impaired balance. PT reached out to Dr. Costello about potential of using LUE for RWx as that is pt baseline function, to which he was agreeable. PT returned to pt and with RWx pt ambulated 110 feet with SBA. Pt ambulating safely with RWx and appears to be safe to return home with HHPT at d/c.  -     Row Name 01/18/23 4133          Therapy Assessment/Plan (PT)    Rehab Potential (PT) good, to achieve stated therapy goals  -EL     Criteria for Skilled Interventions Met (PT) yes  -EL     Therapy Frequency (PT) 3  times/wk  -EL     Predicted Duration of Therapy Intervention (PT) Until d/c  -EL     Row Name 01/18/23 1535          Vital Signs    O2 Delivery Pre Treatment room air  -EL     O2 Delivery Intra Treatment room air  -EL     O2 Delivery Post Treatment room air  -EL     Pre Patient Position Supine  -EL     Intra Patient Position Standing  -EL     Post Patient Position Sitting  -EL     Row Name 01/18/23 1535          Positioning and Restraints    Pre-Treatment Position in bed  -EL     Post Treatment Position chair  -EL     In Chair notified nsg;reclined;call light within reach;encouraged to call for assist;exit alarm on  -EL           User Key  (r) = Recorded By, (t) = Taken By, (c) = Cosigned By    Initials Name Provider Type    Vidal Barrientos PT Physical Therapist               Outcome Measures     Row Name 01/18/23 1600 01/18/23 0815       How much help from another person do you currently need...    Turning from your back to your side while in flat bed without using bedrails? 4  -EL 4  -SC    Moving from lying on back to sitting on the side of a flat bed without bedrails? 4  -EL 4  -SC    Moving to and from a bed to a chair (including a wheelchair)? 3  -EL 3  -SC    Standing up from a chair using your arms (e.g., wheelchair, bedside chair)? 3  -EL 3  -SC    Climbing 3-5 steps with a railing? 3  -EL 4  -SC    To walk in hospital room? 3  -EL 4  -SC    AM-PAC 6 Clicks Score (PT) 20  -EL 22  -SC    Highest level of mobility 6 --> Walked 10 steps or more  -EL 7 --> Walked 25 feet or more  -SC    Row Name 01/18/23 1600          Functional Assessment    Outcome Measure Options AM-PAC 6 Clicks Basic Mobility (PT)  -EL           User Key  (r) = Recorded By, (t) = Taken By, (c) = Cosigned By    Initials Name Provider Type    Vidal Barrientos PT Physical Therapist    Kimberly Nguyen, RN Registered Nurse                             Physical Therapy Education     Title: PT OT SLP Therapies (Done)     Topic: Physical  Therapy (Done)     Point: Mobility training (Done)     Learning Progress Summary           Patient Acceptance, E,TB, VU by  at 1/18/2023 1600                   Point: Precautions (Done)     Learning Progress Summary           Patient Acceptance, E,TB, VU by  at 1/18/2023 1600                               User Key     Initials Effective Dates Name Provider Type Discipline     06/23/20 -  Vidal Reyez PT Physical Therapist PT              PT Recommendation and Plan  Planned Therapy Interventions (PT): balance training, neuromuscular re-education, transfer training, bed mobility training, patient/family education, gait training, strengthening  Plan of Care Reviewed With: patient  Outcome Evaluation: Pt is an 81 YO F, s/p pacemaker placement this date. Pt lives home alone, typically independent with all ADLs, ambulation with RWx and no recent falls. Pt states she has limited support at home as her son is gone for work most of the time. Pt with pacemaker precautions and educated on them, tolerates ambulating with HHA fair, ambluating approx 30 feet, but demonstrates impaired balance. PT reached out to Dr. Costello about potential of using LUE for RWx as that is pt baseline function, to which he was agreeable. PT returned to pt and with RWx pt ambulated 110 feet with SBA. Pt ambulating safely with RWx and appears to be safe to return home with HHPT at d/c.     Time Calculation:    PT Charges     Row Name 01/18/23 1601             Time Calculation    Start Time 1027  -EL      Stop Time 1054  -EL      Time Calculation (min) 27 min  -EL      PT Received On 01/18/23  -      PT - Next Appointment 01/19/23  -      PT Goal Re-Cert Due Date 02/01/23  -            User Key  (r) = Recorded By, (t) = Taken By, (c) = Cosigned By    Initials Name Provider Type    Vidal Barrientos PT Physical Therapist              Therapy Charges for Today     Code Description Service Date Service Provider Modifiers Qty    [id number]  PT  EVAL MOD COMPLEXITY 4 1/18/2023 Vidal Reyez, PT GP 1          PT G-Codes  Outcome Measure Options: AM-PAC 6 Clicks Basic Mobility (PT)  AM-PAC 6 Clicks Score (PT): 20  PT Discharge Summary  Anticipated Discharge Disposition (PT): home with home health    Vidal Reyez, PT  1/18/2023

## 2023-01-18 NOTE — PLAN OF CARE
Goal Outcome Evaluation:  Plan of Care Reviewed With: patient        Progress: no change    Pt has had one complaint of pain this shift. Pain meds given (see MAR). Pt has been sleeping well this shift. Pt on bedrest until am. Pt cooperative and keeping arm in sling. Dressing has old drainage that has not progressed so far this shift. Pt currently resting.      Problem: Fall Injury Risk  Goal: Absence of Fall and Fall-Related Injury  Outcome: Ongoing, Progressing  Intervention: Identify and Manage Contributors  Recent Flowsheet Documentation  Taken 1/18/2023 0213 by Veronica Gaxiola RN  Medication Review/Management: medications reviewed  Taken 1/18/2023 0052 by Veronica Gaxiola RN  Medication Review/Management: medications reviewed  Self-Care Promotion: independence encouraged  Taken 1/17/2023 2230 by Veronica Gaxiola RN  Medication Review/Management: medications reviewed  Taken 1/17/2023 2020 by Veronica Gaxiola RN  Medication Review/Management: medications reviewed  Self-Care Promotion: independence encouraged  Intervention: Promote Injury-Free Environment  Recent Flowsheet Documentation  Taken 1/18/2023 0213 by Veronica Gaxiola RN  Safety Promotion/Fall Prevention:   activity supervised   assistive device/personal items within reach   clutter free environment maintained   nonskid shoes/slippers when out of bed   room organization consistent   safety round/check completed  Taken 1/18/2023 0052 by Veronica Gaxiola RN  Safety Promotion/Fall Prevention:   activity supervised   assistive device/personal items within reach   clutter free environment maintained   nonskid shoes/slippers when out of bed   room organization consistent   safety round/check completed  Taken 1/17/2023 2230 by Veronica Gaxiola RN  Safety Promotion/Fall Prevention:   activity supervised   assistive device/personal items within reach   clutter free environment maintained   nonskid shoes/slippers when out of bed   room organization consistent    safety round/check completed  Taken 1/17/2023 2020 by Veronica Gaxiola RN  Safety Promotion/Fall Prevention:   activity supervised   assistive device/personal items within reach   clutter free environment maintained   nonskid shoes/slippers when out of bed   room organization consistent   safety round/check completed     Problem: Adult Inpatient Plan of Care  Goal: Plan of Care Review  Outcome: Ongoing, Progressing  Goal: Patient-Specific Goal (Individualized)  Outcome: Ongoing, Progressing  Goal: Absence of Hospital-Acquired Illness or Injury  Outcome: Ongoing, Progressing  Intervention: Identify and Manage Fall Risk  Recent Flowsheet Documentation  Taken 1/18/2023 0213 by Veronica Gaxiola RN  Safety Promotion/Fall Prevention:   activity supervised   assistive device/personal items within reach   clutter free environment maintained   nonskid shoes/slippers when out of bed   room organization consistent   safety round/check completed  Taken 1/18/2023 0052 by Veronica Gaxiola RN  Safety Promotion/Fall Prevention:   activity supervised   assistive device/personal items within reach   clutter free environment maintained   nonskid shoes/slippers when out of bed   room organization consistent   safety round/check completed  Taken 1/17/2023 2230 by Veronica Gaxiola RN  Safety Promotion/Fall Prevention:   activity supervised   assistive device/personal items within reach   clutter free environment maintained   nonskid shoes/slippers when out of bed   room organization consistent   safety round/check completed  Taken 1/17/2023 2020 by Veronica Gaxiola RN  Safety Promotion/Fall Prevention:   activity supervised   assistive device/personal items within reach   clutter free environment maintained   nonskid shoes/slippers when out of bed   room organization consistent   safety round/check completed  Intervention: Prevent Skin Injury  Recent Flowsheet Documentation  Taken 1/18/2023 0052 by Veronica Gaxiola RN  Body Position:  position changed independently  Skin Protection: adhesive use limited  Taken 1/17/2023 2020 by Veronica Gaxiola RN  Body Position: position changed independently  Skin Protection: adhesive use limited  Intervention: Prevent and Manage VTE (Venous Thromboembolism) Risk  Recent Flowsheet Documentation  Taken 1/18/2023 0052 by Veronica Gaxiola RN  Activity Management: bedrest  Range of Motion: active ROM (range of motion) encouraged  Taken 1/17/2023 2020 by Veronica Gaxiola RN  Activity Management: bedrest  VTE Prevention/Management: patient refused intervention  Range of Motion: active ROM (range of motion) encouraged  Intervention: Prevent Infection  Recent Flowsheet Documentation  Taken 1/18/2023 0213 by Veronica Gaxiola RN  Infection Prevention:   hand hygiene promoted   personal protective equipment utilized   rest/sleep promoted   single patient room provided   environmental surveillance performed  Taken 1/18/2023 0052 by Veronica Gaxiola RN  Infection Prevention:   hand hygiene promoted   personal protective equipment utilized   rest/sleep promoted   single patient room provided   environmental surveillance performed  Taken 1/17/2023 2230 by Veronica Gaxiola RN  Infection Prevention:   hand hygiene promoted   personal protective equipment utilized   rest/sleep promoted   single patient room provided   environmental surveillance performed  Taken 1/17/2023 2020 by Veronica Gaxiola RN  Infection Prevention:   hand hygiene promoted   personal protective equipment utilized   rest/sleep promoted   single patient room provided   environmental surveillance performed  Goal: Optimal Comfort and Wellbeing  Outcome: Ongoing, Progressing  Intervention: Monitor Pain and Promote Comfort  Recent Flowsheet Documentation  Taken 1/17/2023 2020 by Veronica Gaxiola RN  Pain Management Interventions: see MAR  Intervention: Provide Person-Centered Care  Recent Flowsheet Documentation  Taken 1/18/2023 0052 by Veronica Gaxiola RN  [facility]  Relationship/Rapport: care explained  Taken 1/17/2023 2020 by eVronica Gaxiola RN  Trust Relationship/Rapport: care explained  Goal: Readiness for Transition of Care  Outcome: Ongoing, Progressing     Problem: Asthma Comorbidity  Goal: Maintenance of Asthma Control  Outcome: Ongoing, Progressing  Intervention: Maintain Asthma Symptom Control  Recent Flowsheet Documentation  Taken 1/18/2023 0213 by Veronica Gaxiola RN  Medication Review/Management: medications reviewed  Taken 1/18/2023 0052 by Veronica Gaxiola RN  Medication Review/Management: medications reviewed  Taken 1/17/2023 2230 by Veronica Gaxiola RN  Medication Review/Management: medications reviewed  Taken 1/17/2023 2020 by Veronica Gaxiola RN  Medication Review/Management: medications reviewed     Problem: Behavioral Health Comorbidity  Goal: Maintenance of Behavioral Health Symptom Control  Outcome: Ongoing, Progressing  Intervention: Maintain Behavioral Health Symptom Control  Recent Flowsheet Documentation  Taken 1/18/2023 0213 by Veronica Gaxiola RN  Medication Review/Management: medications reviewed  Taken 1/18/2023 0052 by Veronica Gaxiola RN  Medication Review/Management: medications reviewed  Taken 1/17/2023 2230 by Veronica Gaxiola RN  Medication Review/Management: medications reviewed  Taken 1/17/2023 2020 by Veronica Gaxiola RN  Medication Review/Management: medications reviewed     Problem: COPD (Chronic Obstructive Pulmonary Disease) Comorbidity  Goal: Maintenance of COPD Symptom Control  Outcome: Ongoing, Progressing  Intervention: Maintain COPD-Symptom Control  Recent Flowsheet Documentation  Taken 1/18/2023 0213 by Veronica Gaxiola RN  Medication Review/Management: medications reviewed  Taken 1/18/2023 0052 by Veronica Gaxiola RN  Supportive Measures: active listening utilized  Medication Review/Management: medications reviewed  Taken 1/17/2023 2230 by Veronica Gaxiola RN  Medication Review/Management: medications reviewed  Taken 1/17/2023  2020 by Veronica Gaxiola RN  Supportive Measures: active listening utilized  Medication Review/Management: medications reviewed     Problem: Diabetes Comorbidity  Goal: Blood Glucose Level Within Targeted Range  Outcome: Ongoing, Progressing     Problem: Heart Failure Comorbidity  Goal: Maintenance of Heart Failure Symptom Control  Outcome: Ongoing, Progressing  Intervention: Maintain Heart Failure-Management  Recent Flowsheet Documentation  Taken 1/18/2023 0213 by Veronica Gaxiola RN  Medication Review/Management: medications reviewed  Taken 1/18/2023 0052 by Veronica Gaxiola RN  Medication Review/Management: medications reviewed  Taken 1/17/2023 2230 by Veronica Gaxiola RN  Medication Review/Management: medications reviewed  Taken 1/17/2023 2020 by Veronica Gaxiola RN  Medication Review/Management: medications reviewed     Problem: Hypertension Comorbidity  Goal: Blood Pressure in Desired Range  Outcome: Ongoing, Progressing  Intervention: Maintain Blood Pressure Management  Recent Flowsheet Documentation  Taken 1/18/2023 0213 by Veronica Gaxiola RN  Medication Review/Management: medications reviewed  Taken 1/18/2023 0052 by Veronica Gaxiola RN  Medication Review/Management: medications reviewed  Taken 1/17/2023 2230 by Veronica Gaxiola RN  Medication Review/Management: medications reviewed  Taken 1/17/2023 2020 by Veronica Gaxiola RN  Medication Review/Management: medications reviewed     Problem: Obstructive Sleep Apnea Risk or Actual Comorbidity Management  Goal: Unobstructed Breathing During Sleep  Outcome: Ongoing, Progressing     Problem: Osteoarthritis Comorbidity  Goal: Maintenance of Osteoarthritis Symptom Control  Outcome: Ongoing, Progressing  Intervention: Maintain Osteoarthritis Symptom Control  Recent Flowsheet Documentation  Taken 1/18/2023 0213 by Veronica Gaxiola RN  Medication Review/Management: medications reviewed  Taken 1/18/2023 0052 by Veronica Gaxiola RN  Activity Management:  bedrest  Medication Review/Management: medications reviewed  Taken 1/17/2023 2230 by Veronica Gaxiola RN  Medication Review/Management: medications reviewed  Taken 1/17/2023 2020 by Veronica Gaxiola RN  Activity Management: bedrest  Medication Review/Management: medications reviewed     Problem: Pain Chronic (Persistent) (Comorbidity Management)  Goal: Acceptable Pain Control and Functional Ability  Outcome: Ongoing, Progressing  Intervention: Manage Persistent Pain  Recent Flowsheet Documentation  Taken 1/18/2023 0213 by Veronica Gaxiola RN  Medication Review/Management: medications reviewed  Taken 1/18/2023 0052 by Veronica Gaxiola RN  Sleep/Rest Enhancement: relaxation techniques promoted  Medication Review/Management: medications reviewed  Taken 1/17/2023 2230 by Veronica Gaxiola RN  Medication Review/Management: medications reviewed  Taken 1/17/2023 2020 by Veronica Gaxiola RN  Sleep/Rest Enhancement: relaxation techniques promoted  Medication Review/Management: medications reviewed  Intervention: Develop Pain Management Plan  Recent Flowsheet Documentation  Taken 1/17/2023 2020 by Veronica Gaxiola RN  Pain Management Interventions: see MAR  Intervention: Optimize Psychosocial Wellbeing  Recent Flowsheet Documentation  Taken 1/18/2023 0052 by Veronica Gaxiola RN  Supportive Measures: active listening utilized  Diversional Activities: television  Family/Support System Care: self-care encouraged  Taken 1/17/2023 2020 by Veronica Gaxiola RN  Supportive Measures: active listening utilized  Diversional Activities: television  Family/Support System Care:   self-care encouraged   presence promoted     Problem: Seizure Disorder Comorbidity  Goal: Maintenance of Seizure Control  Outcome: Ongoing, Progressing     Problem: Skin Injury Risk Increased  Goal: Skin Health and Integrity  Outcome: Ongoing, Progressing  Intervention: Optimize Skin Protection  Recent Flowsheet Documentation  Taken 1/18/2023 0052 by Veronica Gaxiola  JENNY RN  Pressure Reduction Techniques: frequent weight shift encouraged  Head of Bed (HOB) Positioning: HOB at 20-30 degrees  Pressure Reduction Devices: pressure-redistributing mattress utilized  Skin Protection: adhesive use limited  Taken 1/17/2023 2020 by Veronica Gaxiola RN  Pressure Reduction Techniques: frequent weight shift encouraged  Head of Bed (HOB) Positioning: HOB at 20-30 degrees  Pressure Reduction Devices: pressure-redistributing mattress utilized  Skin Protection: adhesive use limited

## 2023-01-18 NOTE — PROGRESS NOTES
[facility] CARDIOLOGY  [facility]        LOS:  LOS: 4 days   Patient Name: Catalina Moreno  Age/Sex: 82 y.o. female  : 1940  MRN: [id number]    Day of Service: 23   Length of Stay: 4  Encounter Provider: KALYANI Ruelas  Place of Service: [facility] CARDIOLOGY  Patient Care Team:  Rubio Dover MD as PCP - General (Family Medicine)  Flash Gonzalez MD as Consulting Physician (Cardiology)    Subjective:     Chief Complaint:  F/U AF, tachy-derick    Subjective:   Patient is sitting up in the chair.  She denies dyspnea.  She is preparing to work with physical therapy.    Current Medications:   Scheduled Meds:apixaban, 5 mg, Oral, Q12H  atorvastatin, 10 mg, Oral, Nightly  digoxin, 125 mcg, Oral, Daily  gabapentin, 600 mg, Oral, 4x Daily  levothyroxine, 88 mcg, Oral, Q AM  lidocaine, 2 patch, Transdermal, Q24H  pantoprazole, 40 mg, Oral, Daily With Lunch  rOPINIRole, 0.25 mg, Oral, Nightly  senna-docusate sodium, 2 tablet, Oral, BID  sertraline, 50 mg, Oral, Nightly  sildenafil, 20 mg, Oral, Q8H  sodium chloride, 10 mL, Intravenous, Q12H  sotalol, 80 mg, Oral, Q12H      Continuous Infusions:     Allergies:  Allergies   Allergen Reactions   • Baclofen Rash   • Codeine Nausea Only   • Ibuprofen Unknown (See Comments)     Patient doesn't know----Motin   • Methocarbamol Unknown (See Comments)     Patient doesn't know   • Naproxen Unknown (See Comments)     Pt. Doesn't know    • Tizanidine Hcl Other (See Comments)     Syncope    • Tolmetin Dizziness     Pt. Doesn't know-- same as Tolectin       ROS    Objective:     Temp:  [97.6 °F (36.4 °C)-98 °F (36.7 °C)] 97.6 °F (36.4 °C)  Heart Rate:  [70-85] 83  Resp:  [11-20] 12  BP: (115-172)/(42-86) 131/59     Intake/Output Summary (Last 24 hours) at 2023 1353  Last data filed at 2023 1334  Gross per 24 hour   Intake 1200 ml   Output --   Net 1200 ml     Body mass index is  29.64 kg/m².      01/16/23  0603 01/17/23  0531 01/18/23  0500   Weight: 73.5 kg (162 lb 0.6 oz) 74.6 kg (164 lb 7.4 oz) 75.9 kg (167 lb 5.3 oz)         Physical Exam:  Neuro:  CV:  Resp:  GI:  Ext:  Tele: AAOx3, no gross deficits  S1S2 RRR, no murmur  Non-labored, faint +wheezing  BS+, abd soft  Pedal pulses palp, trace non-pitting BLE edema  AV Pacing  Skin: left subclavian without hematoma                                                   Lab Review:   Results from last 7 days   Lab Units 01/18/23  0031 01/17/23  0157 01/16/23 0057 01/15/23  1058 01/14/23 2022   SODIUM mmol/L  --  137 137   < > 140   POTASSIUM mmol/L 4.4 5.2 4.3   < > 4.0   CHLORIDE mmol/L  --  106 103   < > 102   CO2 mmol/L  --  23.0 26.0   < > 26.0   BUN mg/dL  --  21 21   < > 10   CREATININE mg/dL  --  0.93 0.93   < > 0.86   GLUCOSE mg/dL  --  113* 97   < > 107*   CALCIUM mg/dL  --  7.7* 8.1*   < > 9.6   AST (SGOT) U/L  --   --   --   --  17   ALT (SGPT) U/L  --   --   --   --  13    < > = values in this interval not displayed.     Results from last 7 days   Lab Units 01/15/23  1058 01/14/23  2022   CK TOTAL U/L  --  50   TROPONIN T ng/mL <0.010 <0.010     Results from last 7 days   Lab Units 01/17/23  0157 01/16/23 0057   WBC 10*3/mm3 4.90 5.40   HEMOGLOBIN g/dL 9.4* 9.8*   HEMATOCRIT % 30.7* 30.6*   PLATELETS 10*3/mm3 184 199         Results from last 7 days   Lab Units 01/18/23  0031 01/17/23 0157   MAGNESIUM mg/dL 2.2 2.2           Invalid input(s): LDLCALC  Results from last 7 days   Lab Units 01/14/23 2022   PROBNP pg/mL 9,487.0*           Recent Radiology:  Imaging Results (Most Recent)     Procedure Component Value Units Date/Time    XR Chest 1 View [[id number]] Collected: 01/17/23 1348     Updated: 01/17/23 1351    Narrative:      XR CHEST 1 VW    Date of Exam: 1/17/2023 1:35 PM EST    Indication: Post ICD / Pacer Implant.    Comparison: None available.    Findings:  Dual-lead AICD pacemaker is identified. No evidence of  pneumothorax. There is right midlung density which could reflect atelectasis or developing pneumonia. Cardiac mediastinal contours within normal limits. Elevation right hemidiaphragm.      Impression:      Impression:    1. Interval placement of dual-lead pacemaker.  2. Interval development of right midlung opacity which may reflect atelectasis or pneumonia.    Electronically Signed: Velasquez Romano    1/17/2023 1:48 PM EST    Workstation ID: [id number]    XR Chest 1 View [[id number]] Collected: 01/14/23 2013     Updated: 01/14/23 2016    Narrative:      XR CHEST 1 VW    Date of Exam: 1/14/2023 8:02 PM EST    Indication: Chest pain short of breath.    Comparison: 1/4/2023    Findings:  Heart is mildly enlarged. Patient is rotated to the right. The lungs are clear. No pneumothorax or pleural effusion. The osseous structures appear intact.      Impression:      Impression:  Stable chronic findings without acute process.    Electronically Signed: Edil Joshua    1/14/2023 8:14 PM EST    Workstation ID: [id number]          ECHOCARDIOGRAM:    Results for orders placed during the hospital encounter of 01/04/23    Adult Transthoracic Echo Complete With Contrast if Necessary Per Protocol    Interpretation Summary  •  Left ventricular ejection fraction appears to be 36 - 40%.  •  There is calcification of the aortic valve.  •  Moderate mitral valve regurgitation is present.  •  Estimated right ventricular systolic pressure from tricuspid regurgitation is normal (<35 mmHg).  •  No pericardial effusion noted        I reviewed the patient's new clinical results.    EKG:      Assessment:       Atrial fibrillation with RVR (HCC)    Depressive disorder    Hypothyroidism    Stage 3a chronic kidney disease (HCC)    Chronic low back pain    Generalized anxiety disorder    Polyneuropathy, unspecified    Restless legs syndrome    Paroxysmal atrial fibrillation (HCC)    Essential (primary) hypertension    Pulmonary hypertension, unspecified  (HCC)    Chronic systolic CHF (congestive heart failure) (HCC)    Coronary artery disease involving native coronary artery of native heart without angina pectoris    Moderate mitral regurgitation    Class 1 obesity due to excess calories with serious comorbidity and body mass index (BMI) of 30.0 to 30.9 in adult    Hypotension    Sick sinus syndrome (HCC)    1) SSS / tachybrady s/p [employer] dual chamber PPM 1/17    2) PAF  - failed tikosyn d/t bradycardia and prolonged QT  - continue on sotolol 80 PO bid and digoxin 125 mcg PO daily  - restarted on eliquis for anticoagulation    Plan:   Tele shows AV Pacing.  Device interrogation shows normal function.  CXR shows no post-op complications.  Ok to resume eliquis.  QT is stable on sotalol. BP controlled currently.  Patient needs a post-op wound check in one week.  F/u with Dr. Valero in one month.  Continue keflex 500 mg PO tid x one week on discharge.  Case d/w Dr. Valero.        Electronically signed by KALYANI Ruelas, 01/18/23, 2:06 PM EST.

## 2023-01-18 NOTE — PROGRESS NOTES
Referring Provider: Hospitalist    Reason for follow-up: Atrial fibrillation     Patient Care Team:  Rubio Dover MD as PCP - General (Family Medicine)  Flash Gonzalez MD as Consulting Physician (Cardiology)    Subjective .  Patient is doing well without any symptoms today.    Objective Sitting in chair comfortably.  Patient had a pacemaker     Review of Systems   Constitutional: Negative for fever and malaise/fatigue.   HENT: Negative for ear pain and nosebleeds.    Eyes: Negative for blurred vision and double vision.   Cardiovascular: Negative for chest pain, dyspnea on exertion and palpitations.   Respiratory: Negative for cough and shortness of breath.    Skin: Negative for rash.   Musculoskeletal: Negative for joint pain.   Gastrointestinal: Negative for abdominal pain, nausea and vomiting.   Neurological: Negative for focal weakness and headaches.   Psychiatric/Behavioral: Negative for depression. The patient is not nervous/anxious.    All other systems reviewed and are negative.      Baclofen, Codeine, Ibuprofen, Methocarbamol, Naproxen, Tizanidine hcl, and Tolmetin    Scheduled Meds:apixaban, 5 mg, Oral, Q12H  atorvastatin, 10 mg, Oral, Nightly  digoxin, 125 mcg, Oral, Daily  gabapentin, 600 mg, Oral, 4x Daily  levothyroxine, 88 mcg, Oral, Q AM  lidocaine, 2 patch, Transdermal, Q24H  pantoprazole, 40 mg, Oral, Daily With Lunch  rOPINIRole, 0.25 mg, Oral, Nightly  senna-docusate sodium, 2 tablet, Oral, BID  sertraline, 50 mg, Oral, Nightly  sildenafil, 20 mg, Oral, Q8H  sodium chloride, 10 mL, Intravenous, Q12H  sotalol, 80 mg, Oral, Q12H      Continuous Infusions:   PRN Meds:.•  acetaminophen **OR** acetaminophen **OR** acetaminophen  •  senna-docusate sodium **AND** polyethylene glycol **AND** bisacodyl **AND** bisacodyl  •  guaifenesin  •  HYDROcodone-acetaminophen  •  loperamide  •  magnesium sulfate **OR** magnesium sulfate **OR** magnesium sulfate  •  melatonin  •  Morphine **AND**  "naloxone  •  ondansetron  •  potassium chloride **OR** potassium chloride  •  [COMPLETED] Insert Peripheral IV **AND** sodium chloride  •  sodium chloride  •  sodium chloride  •  tiZANidine        VITAL SIGNS  Vitals:    01/18/23 0500 01/18/23 0503 01/18/23 0832 01/18/23 0938   BP: 150/63 150/63 115/42 121/52   BP Location: Right arm      Patient Position: Lying      Pulse: 70  71 70   Resp: 12   12   Temp: 98 °F (36.7 °C)   97.6 °F (36.4 °C)   TempSrc: Oral   Oral   SpO2: 92%   96%   Weight: 75.9 kg (167 lb 5.3 oz)      Height:           Flowsheet Rows    Flowsheet Row First Filed Value   Admission Height 160 cm (63\") Documented at 01/14/2023 1927   Admission Weight 79.4 kg (175 lb) Documented at 01/14/2023 1927           TELEMETRY: Sinus bradycardia with PACs    Physical Exam:  Constitutional:       Appearance: Well-developed.   Eyes:      General: No scleral icterus.     Conjunctiva/sclera: Conjunctivae normal.      Pupils: Pupils are equal, round, and reactive to light.   HENT:      Head: Normocephalic and atraumatic.   Neck:      Vascular: No carotid bruit or JVD.   Pulmonary:      Effort: Pulmonary effort is normal.      Breath sounds: Normal breath sounds. No wheezing. No rales.   Cardiovascular:      Normal rate. Regular rhythm.   Pulses:     Intact distal pulses.   Abdominal:      General: Bowel sounds are normal.      Palpations: Abdomen is soft.   Musculoskeletal: Normal range of motion.      Cervical back: Normal range of motion and neck supple. Skin:     General: Skin is warm and dry.      Findings: No rash.   Neurological:      Mental Status: Alert.      Comments: No focal deficits          Results Review:   I reviewed the patient's new clinical results.  Lab Results (last 24 hours)     Procedure Component Value Units Date/Time    Magnesium [[id number]]  (Normal) Collected: 01/18/23 0031    Specimen: Blood Updated: 01/18/23 0147     Magnesium 2.2 mg/dL     Potassium [[id number]]  (Normal) Collected: " 01/18/23 0031    Specimen: Blood Updated: 01/18/23 0147     Potassium 4.4 mmol/L           Imaging Results (Last 24 Hours)     Procedure Component Value Units Date/Time    XR Chest 1 View [[id number]] Collected: 01/17/23 1348     Updated: 01/17/23 1351    Narrative:      XR CHEST 1 VW    Date of Exam: 1/17/2023 1:35 PM EST    Indication: Post ICD / Pacer Implant.    Comparison: None available.    Findings:  Dual-lead AICD pacemaker is identified. No evidence of pneumothorax. There is right midlung density which could reflect atelectasis or developing pneumonia. Cardiac mediastinal contours within normal limits. Elevation right hemidiaphragm.      Impression:      Impression:    1. Interval placement of dual-lead pacemaker.  2. Interval development of right midlung opacity which may reflect atelectasis or pneumonia.    Electronically Signed: Velasquez Romano    1/17/2023 1:48 PM EST    Workstation ID: [id number]          EKG      I personally viewed and interpreted the patient's EKG/Telemetry data:    ECHOCARDIOGRAM:    STRESS MYOVIEW:    CARDIAC CATHETERIZATION:    OTHER:         Assessment & Plan     Principal Problem:    Atrial fibrillation with RVR ([facility])  Active Problems:    Depressive disorder    Hypothyroidism    Stage 3a chronic kidney disease ([facility])    Chronic low back pain    Generalized anxiety disorder    Polyneuropathy, unspecified    Restless legs syndrome    Paroxysmal atrial fibrillation ([facility])    Essential (primary) hypertension    Pulmonary hypertension, unspecified ([facility])    Chronic systolic CHF (congestive heart failure) ([facility])    Coronary artery disease involving native coronary artery of native heart without angina pectoris    Moderate mitral regurgitation    Class 1 obesity due to excess calories with serious comorbidity and body mass index (BMI) of 30.0 to 30.9 in adult    Hypotension    Sick sinus syndrome (HCC)       Patient presented with the palpitations and shortness of breath and had atrial  fibrillation with rapid response  Patient was seen yesterday by my colleague and was placed on Tikosyn  Patient converted to sinus rhythm significant bradycardia and asymptomatic.  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  Patient was seen by electrophysiologist  Patient had a permanent pacemaker done and is doing well  Patient's Tikosyn was dropped because of prolonged QT interval.  Patient is started on sotalol and digoxin  Her Eliquis has been resumed  Will watch her QT interval again.  Patient will be discharged to a rehab place for some time.    I discussed the patients findings and my recommendations with patient and nurse    Flash Gonzalez MD  01/18/23  12:23 EST

## 2023-01-19 PROCEDURE — 84132 ASSAY OF SERUM POTASSIUM: CPT | Performed by: INTERNAL MEDICINE

## 2023-01-19 PROCEDURE — 93010 ELECTROCARDIOGRAM REPORT: CPT | Performed by: INTERNAL MEDICINE

## 2023-01-19 PROCEDURE — 97116 GAIT TRAINING THERAPY: CPT

## 2023-01-19 PROCEDURE — 97110 THERAPEUTIC EXERCISES: CPT

## 2023-01-19 PROCEDURE — 36415 COLL VENOUS BLD VENIPUNCTURE: CPT | Performed by: INTERNAL MEDICINE

## 2023-01-19 PROCEDURE — 99232 SBSQ HOSP IP/OBS MODERATE 35: CPT | Performed by: INTERNAL MEDICINE

## 2023-01-19 PROCEDURE — 93005 ELECTROCARDIOGRAM TRACING: CPT | Performed by: INTERNAL MEDICINE

## 2023-01-19 PROCEDURE — 83735 ASSAY OF MAGNESIUM: CPT | Performed by: INTERNAL MEDICINE

## 2023-01-19 RX ORDER — SOTALOL HYDROCHLORIDE 80 MG/1
80 TABLET ORAL EVERY 12 HOURS SCHEDULED
Qty: 60 TABLET | Refills: 0 | Status: SHIPPED | OUTPATIENT
Start: 2023-01-19 | End: 2023-02-18

## 2023-01-19 RX ORDER — SILDENAFIL CITRATE 20 MG/1
20 TABLET ORAL EVERY 8 HOURS SCHEDULED
Qty: 90 TABLET | Refills: 0 | Status: SHIPPED | OUTPATIENT
Start: 2023-01-19 | End: 2023-02-18

## 2023-01-19 RX ADMIN — HYDROCODONE BITARTRATE AND ACETAMINOPHEN 1 TABLET: 7.5; 325 TABLET ORAL at 04:05

## 2023-01-19 RX ADMIN — SILDENAFIL CITRATE 20 MG: 20 TABLET ORAL at 20:42

## 2023-01-19 RX ADMIN — LIDOCAINE 2 PATCH: 50 PATCH TOPICAL at 13:22

## 2023-01-19 RX ADMIN — TIZANIDINE 4 MG: 4 TABLET ORAL at 20:42

## 2023-01-19 RX ADMIN — PANTOPRAZOLE SODIUM 40 MG: 40 TABLET, DELAYED RELEASE ORAL at 13:17

## 2023-01-19 RX ADMIN — HYDROCODONE BITARTRATE AND ACETAMINOPHEN 1 TABLET: 7.5; 325 TABLET ORAL at 14:45

## 2023-01-19 RX ADMIN — GABAPENTIN 600 MG: 600 TABLET, FILM COATED ORAL at 13:17

## 2023-01-19 RX ADMIN — SILDENAFIL CITRATE 20 MG: 20 TABLET ORAL at 13:17

## 2023-01-19 RX ADMIN — Medication 10 ML: at 20:48

## 2023-01-19 RX ADMIN — SILDENAFIL CITRATE 20 MG: 20 TABLET ORAL at 06:10

## 2023-01-19 RX ADMIN — GABAPENTIN 600 MG: 600 TABLET, FILM COATED ORAL at 08:51

## 2023-01-19 RX ADMIN — SENNOSIDES AND DOCUSATE SODIUM 2 TABLET: 50; 8.6 TABLET ORAL at 20:42

## 2023-01-19 RX ADMIN — Medication 10 ML: at 08:51

## 2023-01-19 RX ADMIN — GABAPENTIN 600 MG: 600 TABLET, FILM COATED ORAL at 19:37

## 2023-01-19 RX ADMIN — HYDROCODONE BITARTRATE AND ACETAMINOPHEN 1 TABLET: 7.5; 325 TABLET ORAL at 20:47

## 2023-01-19 RX ADMIN — SOTALOL HYDROCHLORIDE 80 MG: 80 TABLET ORAL at 20:42

## 2023-01-19 RX ADMIN — ROPINIROLE HYDROCHLORIDE 0.25 MG: 0.25 TABLET, FILM COATED ORAL at 20:42

## 2023-01-19 RX ADMIN — Medication 5 MG: at 20:42

## 2023-01-19 RX ADMIN — APIXABAN 5 MG: 5 TABLET, FILM COATED ORAL at 20:42

## 2023-01-19 RX ADMIN — LEVOTHYROXINE SODIUM 88 MCG: 0.09 TABLET ORAL at 06:10

## 2023-01-19 RX ADMIN — APIXABAN 5 MG: 5 TABLET, FILM COATED ORAL at 08:51

## 2023-01-19 RX ADMIN — SERTRALINE 50 MG: 50 TABLET, FILM COATED ORAL at 20:42

## 2023-01-19 RX ADMIN — HYDROCODONE BITARTRATE AND ACETAMINOPHEN 1 TABLET: 7.5; 325 TABLET ORAL at 08:50

## 2023-01-19 RX ADMIN — ATORVASTATIN CALCIUM 10 MG: 20 TABLET, FILM COATED ORAL at 20:42

## 2023-01-19 RX ADMIN — SOTALOL HYDROCHLORIDE 80 MG: 80 TABLET ORAL at 08:51

## 2023-01-19 RX ADMIN — DIGOXIN 125 MCG: 125 TABLET ORAL at 13:17

## 2023-01-19 NOTE — PROGRESS NOTES
[facility]     Progress Note    Patient Name: Catalina Moreno  : 1940  MRN: [id number]  Primary Care Physician:  Rubio Dover MD  Date of admission: 2023    Subjective   Subjective     Chief Complaint: Palpitations, weakness    Patient reports some improvement in fatigue. Concerned about going home and being able to take care of herself. She thinks she can benefit from some rehab.     Review of Systems  Negative excepts as mentioned above    Objective   Objective     Vitals:   Temp:  [97.3 °F (36.3 °C)-98.6 °F (37 °C)] 98.6 °F (37 °C)  Heart Rate:  [70-83] 70  Resp:  [12-17] 16  BP: (101-136)/(42-80) 118/47    Physical Exam  Constitutional:       General: She is not in acute distress.     Appearance: Normal appearance. She is not ill-appearing.   HENT:      Mouth/Throat:      Mouth: Mucous membranes are dry.   Eyes:      Extraocular Movements: Extraocular movements intact.      Pupils: Pupils are equal, round, and reactive to light.   Cardiovascular:      Rate and Rhythm: Normal rate. Rhythm irregular.   Pulmonary:      Effort: Pulmonary effort is normal. No respiratory distress.      Breath sounds: No wheezing or rales.   Abdominal:      General: There is no distension.      Palpations: Abdomen is soft.      Tenderness: There is no abdominal tenderness.   Neurological:      General: No focal deficit present.      Mental Status: She is alert and oriented to person, place, and time.          Result Review    Result Review:  I have personally reviewed the results from the time of this admission to 2023 09:30 EST and agree with these findings:  [x]  Laboratory list / accordion  []  Microbiology  []  Radiology  []  EKG/Telemetry   []  Cardiology/Vascular   []  Pathology  []  Old records  []  Other:  Most notable findings include:     Assessment & Plan   Assessment / Plan     Brief Patient Summary:  Catalina Moreno is a 82 y.o. female who is admitted for atrial fibrillation with  RVR    Active Hospital Problems:  Active Hospital Problems    Diagnosis    • **Atrial fibrillation with RVR ([facility])    • Class 1 obesity due to excess calories with serious comorbidity and body mass index (BMI) of 30.0 to 30.9 in adult    • Hypotension    • Sick sinus syndrome ([facility])    • Moderate mitral regurgitation    • Chronic systolic CHF (congestive heart failure) ([facility])    • Restless legs syndrome    • Generalized anxiety disorder    • Pulmonary hypertension, unspecified ([facility])    • Paroxysmal atrial fibrillation ([facility])    • Essential (primary) hypertension    • Polyneuropathy, unspecified    • Coronary artery disease involving native coronary artery of native heart without angina pectoris    • Stage 3a chronic kidney disease ([facility])    • Chronic low back pain    • Depressive disorder    • Hypothyroidism      Plan:     Atrial fibrillation with RVR  -Status post pacemaker placement on 1/17  -Continue digoxin  -Continue sotalol  -S/P PPM placement 1/17  -Continuous cardiac monitoring  -Continue Eliquis  -Cardiology recommendation     Chronic systolic heart failure  Pulmonary hypertension   -EF 36 to 40% on 1/5/2023  -Continue home sildenafil     Essential Hypertension  -Controlled  -Monitor BP  -Continue home metoprolol succinate     Hyperlipidemia  -Continue home atorvastatin     Chronic pain  -Stable  -Continue home Neurontin, Zanaflex, Norco (inspect verified)  -Continue home Lidoderm patch     Depression  -Chronic, stable  -Continue home Zoloft      Hypothyroidism  -Continue home levothyroxine     Restless leg syndrome  -Continue home Requip    DVT prophylaxis:  Medical DVT prophylaxis orders are present.    CODE STATUS:    Code Status (Patient has no pulse and is not breathing): CPR (Attempt to Resuscitate)  Medical Interventions (Patient has pulse or is breathing): Full Support    Disposition:  I expect patient to be discharged early next week.    Zack Cifuentes MD

## 2023-01-19 NOTE — PROGRESS NOTES
[facility]     Progress Note    Patient Name: Catalina Moreno  : 1940  MRN: [id number]  Primary Care Physician:  Rubio Dover MD  Date of admission: 2023    Subjective   Subjective     Chief Complaint: Palpitations, weakness    Concerned about going home and being able to take care of herself. She thinks she can benefit from some rehab.  Continues to report fatigue and tiredness.    Review of Systems  Negative excepts as mentioned above    Objective   Objective     Vitals:   Temp:  [97.3 °F (36.3 °C)-98.6 °F (37 °C)] 98.5 °F (36.9 °C)  Heart Rate:  [70-78] 70  Resp:  [14-17] 14  BP: (106-136)/(46-80) 115/46    Physical Exam  Constitutional:       General: She is not in acute distress.     Appearance: Normal appearance. She is not ill-appearing.   HENT:      Mouth/Throat:      Mouth: Mucous membranes are dry.   Eyes:      Extraocular Movements: Extraocular movements intact.      Pupils: Pupils are equal, round, and reactive to light.   Cardiovascular:      Rate and Rhythm: Normal rate. Rhythm irregular.   Pulmonary:      Effort: Pulmonary effort is normal. No respiratory distress.      Breath sounds: No wheezing or rales.   Abdominal:      General: There is no distension.      Palpations: Abdomen is soft.      Tenderness: There is no abdominal tenderness.   Neurological:      General: No focal deficit present.      Mental Status: She is alert and oriented to person, place, and time.          Result Review    Result Review:  I have personally reviewed the results from the time of this admission to 2023 15:08 EST and agree with these findings:  [x]  Laboratory list / accordion  []  Microbiology  []  Radiology  []  EKG/Telemetry   []  Cardiology/Vascular   []  Pathology  []  Old records  []  Other:  Most notable findings include:     Assessment & Plan   Assessment / Plan     Brief Patient Summary:  Catalina Moreno is a 82 y.o. female who is admitted for atrial fibrillation with  RVR    Active Hospital Problems:  Active Hospital Problems    Diagnosis    • **Atrial fibrillation with RVR ([facility])    • Class 1 obesity due to excess calories with serious comorbidity and body mass index (BMI) of 30.0 to 30.9 in adult    • Hypotension    • Sick sinus syndrome ([facility])    • Moderate mitral regurgitation    • Chronic systolic CHF (congestive heart failure) ([facility])    • Restless legs syndrome    • Generalized anxiety disorder    • Pulmonary hypertension, unspecified ([facility])    • Paroxysmal atrial fibrillation ([facility])    • Essential (primary) hypertension    • Polyneuropathy, unspecified    • Coronary artery disease involving native coronary artery of native heart without angina pectoris    • Stage 3a chronic kidney disease ([facility])    • Chronic low back pain    • Depressive disorder    • Hypothyroidism      Plan:     Atrial fibrillation with RVR  -Status post pacemaker placement on 1/17  -Continue digoxin  -Continue sotalol  -S/P PPM placement 1/17  -Continuous cardiac monitoring  -Continue Eliquis  -Cardiology recommendation     Chronic systolic heart failure  Pulmonary hypertension   -EF 36 to 40% on 1/5/2023  -Continue home sildenafil     Essential Hypertension  -Controlled  -Monitor BP  -Continue home metoprolol succinate     Hyperlipidemia  -Continue home atorvastatin     Chronic pain  -Stable  -Continue home Neurontin, Zanaflex, Norco (inspect verified)  -Continue home Lidoderm patch     Depression  -Chronic, stable  -Continue home Zoloft      Hypothyroidism  -Continue home levothyroxine     Restless leg syndrome  -Continue home Requip    DVT prophylaxis:  Medical DVT prophylaxis orders are present.    CODE STATUS:    Code Status (Patient has no pulse and is not breathing): CPR (Attempt to Resuscitate)  Medical Interventions (Patient has pulse or is breathing): Full Support    Disposition:  I expect patient to be discharged early next week.    Zack Cifuentes MD

## 2023-01-19 NOTE — PLAN OF CARE
Goal Outcome Evaluation:  Plan of Care Reviewed With: patient        Progress: no change  Outcome Evaluation: Pt has not been resting well this shift. pts feet have been restless and hurting. Pain meds given (see MAR). Pt has been NPO since midnight. VSS so far this shift.      Problem: Fall Injury Risk  Goal: Absence of Fall and Fall-Related Injury  1/19/2023 0350 by Veronica Gaxiola RN  Outcome: Ongoing, Progressing  1/19/2023 0349 by Veronica Gaxiola RN  Outcome: Ongoing, Progressing  Intervention: Identify and Manage Contributors  Recent Flowsheet Documentation  Taken 1/19/2023 0213 by Veronica Gaxiola RN  Medication Review/Management: medications reviewed  Taken 1/19/2023 0037 by Veronica Gaxiola RN  Medication Review/Management: medications reviewed  Self-Care Promotion: independence encouraged  Taken 1/18/2023 2215 by Veronica Gaxiola RN  Medication Review/Management: medications reviewed  Self-Care Promotion: independence encouraged  Taken 1/18/2023 2034 by Veronica Gaxiola RN  Medication Review/Management: medications reviewed  Self-Care Promotion: independence encouraged  Intervention: Promote Injury-Free Environment  Recent Flowsheet Documentation  Taken 1/19/2023 0213 by Veronica Gaxiola RN  Safety Promotion/Fall Prevention:   activity supervised   assistive device/personal items within reach   clutter free environment maintained   nonskid shoes/slippers when out of bed   room organization consistent   safety round/check completed  Taken 1/19/2023 0037 by Veronica Gaxiola RN  Safety Promotion/Fall Prevention:   assistive device/personal items within reach   activity supervised   clutter free environment maintained   nonskid shoes/slippers when out of bed   room organization consistent   safety round/check completed  Taken 1/18/2023 2215 by Vreonica Gaxiola RN  Safety Promotion/Fall Prevention:   activity supervised   assistive device/personal items within reach   clutter free environment maintained    nonskid shoes/slippers when out of bed   room organization consistent   safety round/check completed  Taken 1/18/2023 2034 by Veronica Gaxiola RN  Safety Promotion/Fall Prevention:   activity supervised   assistive device/personal items within reach   clutter free environment maintained   nonskid shoes/slippers when out of bed   room organization consistent   safety round/check completed     Problem: Adult Inpatient Plan of Care  Goal: Plan of Care Review  1/19/2023 0350 by Veronica Gaxiola RN  Outcome: Ongoing, Progressing  Flowsheets (Taken 1/19/2023 0350)  Progress: no change  Outcome Evaluation: Pt is very sore today s/p pacemaker placement. Pt has had some pain this shift in the shoulder. pt is slightly weak when getting up to bedside commode but does well with a one assisst. Pt has been resting well this shift. VSS so far this shift.  1/19/2023 0349 by Veronica Gaxioal RN  Outcome: Ongoing, Progressing  Goal: Patient-Specific Goal (Individualized)  1/19/2023 0350 by Veronica Gaxiola RN  Outcome: Ongoing, Progressing  1/19/2023 0349 by Veronica Gaxiola RN  Outcome: Ongoing, Progressing  Goal: Absence of Hospital-Acquired Illness or Injury  1/19/2023 0350 by Veronica Gaxiola RN  Outcome: Ongoing, Progressing  1/19/2023 0349 by Veronica Gaxiola RN  Outcome: Ongoing, Progressing  Intervention: Identify and Manage Fall Risk  Recent Flowsheet Documentation  Taken 1/19/2023 0213 by Veronica Gaxiola RN  Safety Promotion/Fall Prevention:   activity supervised   assistive device/personal items within reach   clutter free environment maintained   nonskid shoes/slippers when out of bed   room organization consistent   safety round/check completed  Taken 1/19/2023 0037 by Veronica Gaxiola RN  Safety Promotion/Fall Prevention:   assistive device/personal items within reach   activity supervised   clutter free environment maintained   nonskid shoes/slippers when out of bed   room organization consistent   safety round/check  completed  Taken 1/18/2023 2215 by Veronica Gaxiola RN  Safety Promotion/Fall Prevention:   activity supervised   assistive device/personal items within reach   clutter free environment maintained   nonskid shoes/slippers when out of bed   room organization consistent   safety round/check completed  Taken 1/18/2023 2034 by Veronica Gaxiola RN  Safety Promotion/Fall Prevention:   activity supervised   assistive device/personal items within reach   clutter free environment maintained   nonskid shoes/slippers when out of bed   room organization consistent   safety round/check completed  Intervention: Prevent Skin Injury  Recent Flowsheet Documentation  Taken 1/19/2023 0037 by Veronica Gaxiola RN  Body Position: position changed independently  Skin Protection: adhesive use limited  Taken 1/18/2023 2215 by Veronica Gaxiola RN  Body Position: position changed independently  Taken 1/18/2023 2034 by Veroncia Gaxiola RN  Body Position: position changed independently  Skin Protection: adhesive use limited  Intervention: Prevent and Manage VTE (Venous Thromboembolism) Risk  Recent Flowsheet Documentation  Taken 1/19/2023 0037 by Veronica Gaxiola RN  Activity Management:   activity adjusted per tolerance   activity encouraged  Range of Motion: active ROM (range of motion) encouraged  Taken 1/18/2023 2215 by Veronica Gaxiola RN  Activity Management:   activity adjusted per tolerance   activity encouraged  Taken 1/18/2023 2034 by Veronica Gaxiola RN  Activity Management:   activity adjusted per tolerance   activity encouraged  Range of Motion: active ROM (range of motion) encouraged  Intervention: Prevent Infection  Recent Flowsheet Documentation  Taken 1/19/2023 0213 by Veronica Gaxiola RN  Infection Prevention:   hand hygiene promoted   personal protective equipment utilized   rest/sleep promoted   single patient room provided   environmental surveillance performed  Taken 1/19/2023 0037 by Veronica Gaxiola RN  Infection  Prevention:   hand hygiene promoted   rest/sleep promoted   personal protective equipment utilized   single patient room provided   environmental surveillance performed  Taken 1/18/2023 2215 by Veronica Gaxiola RN  Infection Prevention:   hand hygiene promoted   personal protective equipment utilized   single patient room provided   rest/sleep promoted   environmental surveillance performed  Taken 1/18/2023 2034 by Veronica Gaxiola RN  Infection Prevention: single patient room provided  Goal: Optimal Comfort and Wellbeing  1/19/2023 0350 by Veronica Gaxiola RN  Outcome: Ongoing, Progressing  1/19/2023 0349 by Veronica Gaxiola RN  Outcome: Ongoing, Progressing  Intervention: Monitor Pain and Promote Comfort  Recent Flowsheet Documentation  Taken 1/18/2023 2046 by Veronica Gaxiola RN  Pain Management Interventions: see MAR  Taken 1/18/2023 2034 by Veronica Gaxiola RN  Pain Management Interventions: pain management plan reviewed with patient/caregiver  Intervention: Provide Person-Centered Care  Recent Flowsheet Documentation  Taken 1/19/2023 0037 by Veronica Gaxiola RN  Trust Relationship/Rapport: care explained  Taken 1/18/2023 2034 by Veronica Gaxiola RN  Trust Relationship/Rapport: care explained  Goal: Readiness for Transition of Care  1/19/2023 0350 by Veronica Gaxiola RN  Outcome: Ongoing, Progressing  1/19/2023 0349 by Veronica Gaxiola RN  Outcome: Ongoing, Progressing     Problem: Asthma Comorbidity  Goal: Maintenance of Asthma Control  1/19/2023 0350 by Veronica Gaxiola RN  Outcome: Ongoing, Progressing  1/19/2023 0349 by Veronica Gaxiola RN  Outcome: Ongoing, Progressing  Intervention: Maintain Asthma Symptom Control  Recent Flowsheet Documentation  Taken 1/19/2023 0213 by Veronica Gaxiola RN  Medication Review/Management: medications reviewed  Taken 1/19/2023 0037 by Veronica Gaxiola RN  Medication Review/Management: medications reviewed  Taken 1/18/2023 2215 by Veronica Gaxiola RN  Medication  Review/Management: medications reviewed  Taken 1/18/2023 2034 by Veronica Gaxiola RN  Medication Review/Management: medications reviewed     Problem: Behavioral Health Comorbidity  Goal: Maintenance of Behavioral Health Symptom Control  1/19/2023 0350 by Veronica Gaxiola RN  Outcome: Ongoing, Progressing  1/19/2023 0349 by Veronica Gaxiola RN  Outcome: Ongoing, Progressing  Intervention: Maintain Behavioral Health Symptom Control  Recent Flowsheet Documentation  Taken 1/19/2023 0213 by Veronica Gaxiola RN  Medication Review/Management: medications reviewed  Taken 1/19/2023 0037 by Veronica Gaxiola RN  Medication Review/Management: medications reviewed  Taken 1/18/2023 2215 by Veronica Gaxiola RN  Medication Review/Management: medications reviewed  Taken 1/18/2023 2034 by Veronica Gaxiola RN  Medication Review/Management: medications reviewed     Problem: COPD (Chronic Obstructive Pulmonary Disease) Comorbidity  Goal: Maintenance of COPD Symptom Control  1/19/2023 0350 by Veronica Gaxiola RN  Outcome: Ongoing, Progressing  1/19/2023 0349 by Veronica Gaxiola RN  Outcome: Ongoing, Progressing  Intervention: Maintain COPD-Symptom Control  Recent Flowsheet Documentation  Taken 1/19/2023 0213 by Veronica Gaxiola RN  Medication Review/Management: medications reviewed  Taken 1/19/2023 0037 by Veronica Gaxiola RN  Supportive Measures: active listening utilized  Medication Review/Management: medications reviewed  Taken 1/18/2023 2215 by Veronica Gaxiola RN  Medication Review/Management: medications reviewed  Taken 1/18/2023 2034 by Veronica Gaxiola RN  Supportive Measures: active listening utilized  Medication Review/Management: medications reviewed     Problem: Diabetes Comorbidity  Goal: Blood Glucose Level Within Targeted Range  1/19/2023 0350 by Veronica Gaxiola RN  Outcome: Ongoing, Progressing  1/19/2023 0349 by Veronica Gaxiola RN  Outcome: Ongoing, Progressing     Problem: Heart Failure Comorbidity  Goal: Maintenance  of Heart Failure Symptom Control  1/19/2023 0350 by Veronica Gaxiola RN  Outcome: Ongoing, Progressing  1/19/2023 0349 by Veronica Gaxiola RN  Outcome: Ongoing, Progressing  Intervention: Maintain Heart Failure-Management  Recent Flowsheet Documentation  Taken 1/19/2023 0213 by Veronica Gaxiola RN  Medication Review/Management: medications reviewed  Taken 1/19/2023 0037 by Veronica Gaxiola RN  Medication Review/Management: medications reviewed  Taken 1/18/2023 2215 by Veronica Gaxiola RN  Medication Review/Management: medications reviewed  Taken 1/18/2023 2034 by Veronica Gaxiola RN  Medication Review/Management: medications reviewed     Problem: Hypertension Comorbidity  Goal: Blood Pressure in Desired Range  1/19/2023 0350 by Veronica Gaxiola RN  Outcome: Ongoing, Progressing  1/19/2023 0349 by Veronica Gaxiola RN  Outcome: Ongoing, Progressing  Intervention: Maintain Blood Pressure Management  Recent Flowsheet Documentation  Taken 1/19/2023 0213 by Veronica Gaxiola RN  Medication Review/Management: medications reviewed  Taken 1/19/2023 0037 by Veronica Gaxiola RN  Syncope Management: position changed slowly  Medication Review/Management: medications reviewed  Taken 1/18/2023 2215 by Veronica Gaxiola RN  Medication Review/Management: medications reviewed  Taken 1/18/2023 2034 by Veronica Gaxiola RN  Syncope Management: position changed slowly  Medication Review/Management: medications reviewed     Problem: Obstructive Sleep Apnea Risk or Actual Comorbidity Management  Goal: Unobstructed Breathing During Sleep  1/19/2023 0350 by Veronica Gaxiola RN  Outcome: Ongoing, Progressing  1/19/2023 0349 by Veronica Gaxiola RN  Outcome: Ongoing, Progressing     Problem: Osteoarthritis Comorbidity  Goal: Maintenance of Osteoarthritis Symptom Control  1/19/2023 0350 by Veronica Gaxiola RN  Outcome: Ongoing, Progressing  1/19/2023 0349 by Veronica Gaxiola RN  Outcome: Ongoing, Progressing  Intervention: Maintain Osteoarthritis  Symptom Control  Recent Flowsheet Documentation  Taken 1/19/2023 0213 by Veronica Gaxiola RN  Medication Review/Management: medications reviewed  Taken 1/19/2023 0037 by Veronica Gaxiola RN  Activity Management:   activity adjusted per tolerance   activity encouraged  Medication Review/Management: medications reviewed  Taken 1/18/2023 2215 by Veronica Gaxiola RN  Activity Management:   activity adjusted per tolerance   activity encouraged  Assistive Device Utilized: walker  Medication Review/Management: medications reviewed  Taken 1/18/2023 2034 by Veronica Gaxiola RN  Activity Management:   activity adjusted per tolerance   activity encouraged  Assistive Device Utilized: walker  Medication Review/Management: medications reviewed     Problem: Pain Chronic (Persistent) (Comorbidity Management)  Goal: Acceptable Pain Control and Functional Ability  1/19/2023 0350 by Veronica Gaxiola RN  Outcome: Ongoing, Progressing  1/19/2023 0349 by Veronica Gaxiola RN  Outcome: Ongoing, Progressing  Intervention: Manage Persistent Pain  Recent Flowsheet Documentation  Taken 1/19/2023 0213 by Veronica Gaxiola RN  Medication Review/Management: medications reviewed  Taken 1/19/2023 0037 by Veronica Gaxiola RN  Sleep/Rest Enhancement: awakenings minimized  Medication Review/Management: medications reviewed  Taken 1/18/2023 2215 by Veronica Gaxiola RN  Medication Review/Management: medications reviewed  Taken 1/18/2023 2034 by Veronica Gaxiola RN  Sleep/Rest Enhancement: awakenings minimized  Medication Review/Management: medications reviewed  Intervention: Develop Pain Management Plan  Recent Flowsheet Documentation  Taken 1/18/2023 2046 by Veronica Gaxiola RN  Pain Management Interventions: see MAR  Taken 1/18/2023 2034 by Veronica Gaxiola RN  Pain Management Interventions: pain management plan reviewed with patient/caregiver  Intervention: Optimize Psychosocial Wellbeing  Recent Flowsheet Documentation  Taken 1/19/2023 0037 by Minor  Veronica ALVARADO RN  Supportive Measures: active listening utilized  Diversional Activities: television  Family/Support System Care: self-care encouraged  Taken 1/18/2023 2034 by Veronica Gaxiola RN  Supportive Measures: active listening utilized  Diversional Activities: television  Family/Support System Care: self-care encouraged     Problem: Seizure Disorder Comorbidity  Goal: Maintenance of Seizure Control  1/19/2023 0350 by Veronica Gaxiola RN  Outcome: Ongoing, Progressing  1/19/2023 0349 by Veronica Gaxiola RN  Outcome: Ongoing, Progressing     Problem: Skin Injury Risk Increased  Goal: Skin Health and Integrity  1/19/2023 0350 by Veronica Gaxiola RN  Outcome: Ongoing, Progressing  1/19/2023 0349 by Veronica Gaxiola RN  Outcome: Ongoing, Progressing  Intervention: Optimize Skin Protection  Recent Flowsheet Documentation  Taken 1/19/2023 0037 by Veronica Gaxiola RN  Pressure Reduction Techniques: frequent weight shift encouraged  Head of Bed (HOB) Positioning: HOB at 20-30 degrees  Pressure Reduction Devices: positioning supports utilized  Skin Protection: adhesive use limited  Taken 1/18/2023 2215 by Veronica Gaxiola RN  Head of Bed (HOB) Positioning: HOB at 20-30 degrees  Taken 1/18/2023 2034 by Veronica Gaxiola RN  Pressure Reduction Techniques: frequent weight shift encouraged  Head of Bed (HOB) Positioning: HOB at 20-30 degrees  Pressure Reduction Devices: pressure-redistributing mattress utilized  Skin Protection: adhesive use limited

## 2023-01-19 NOTE — PLAN OF CARE
"Assessment: Catalina Moreno presents with functional mobility impairments which indicate the need for skilled intervention. Tolerating session today without incident.Patient required SUP for Bed Mob and CGA and RW for transfers and SBA and RW for ambulation of around 20ft. Patient needed one rest break to catch the breath because of pain in the chest. Will continue to follow and progress as tolerated.     Plan/Recommendations:   Low Intensity Therapy recommended post-acute care - This is recommended as therapy feels this patient would require 2-3 visits per week. OP or HH would be the best option depending on patient's home bound status. Consider, if the patient has other  \"skilled\" needs such as wounds, IV antibiotics, etc. Combined with \"low intensity\" could also equate to a SNF. If patient is medically complex, consider LTAC.. Pt requires no DME at discharge.     Pt desires Home with Home Health at discharge. Pt cooperative; agreeable to therapeutic recommendations and plan of care.   "

## 2023-01-19 NOTE — DISCHARGE SUMMARY
[facility] Medicine Services  DISCHARGE SUMMARY    Patient Name: Catalina Moreno  : 1940  MRN: [id number]    Date of Admission: 2023  Discharge Diagnosis: Atrial fibrillation with RVR  Date of Discharge:  2023  Primary Care Physician: Rubio Dover MD      Presenting Problem:   Weakness [R53.1]  Atrial fibrillation with RVR (HCC) [I48.91]  Chronic systolic CHF (congestive heart failure) (HCC) [I50.22]  Class 1 obesity due to excess calories with serious comorbidity and body mass index (BMI) of 30.0 to 30.9 in adult [E66.09, Z68.30]    Active and Resolved Hospital Problems:  Active Hospital Problems    Diagnosis POA   • **Atrial fibrillation with RVR (HCC) [I48.91] Yes   • Class 1 obesity due to excess calories with serious comorbidity and body mass index (BMI) of 30.0 to 30.9 in adult [E66.09, Z68.30] Not Applicable   • Hypotension [I95.9] Yes   • Sick sinus syndrome (HCC) [I49.5] Unknown   • Moderate mitral regurgitation [I34.0] Yes   • Chronic systolic CHF (congestive heart failure) (HCC) [I50.22] Yes   • Restless legs syndrome [G25.81] Yes   • Generalized anxiety disorder [F41.1] Yes   • Pulmonary hypertension, unspecified (HCC) [I27.20] Yes   • Paroxysmal atrial fibrillation (HCC) [I48.0] Yes   • Essential (primary) hypertension [I10] Yes   • Polyneuropathy, unspecified [G62.9] Yes   • Coronary artery disease involving native coronary artery of native heart without angina pectoris [I25.10] Yes   • Stage 3a chronic kidney disease (HCC) [N18.31] Yes   • Chronic low back pain [M54.50, G89.29] Yes   • Depressive disorder [F32.A] Yes   • Hypothyroidism [E03.9] Yes      Resolved Hospital Problems   No resolved problems to display.         Hospital Course     Hospital Course:  Catalina Moreno is a 82 y.o. female     Atrial fibrillation with RVR  -Status post pacemaker placement on   -Continue digoxin  -Continue sotalol  -S/P PPM placement  1/17  -Continuous cardiac monitoring  -Continue Eliquis  -Cardiology recommendation     Chronic systolic heart failure  Pulmonary hypertension   -EF 36 to 40% on 1/5/2023  -Continue home sildenafil     Essential Hypertension  -Controlled  -Monitor BP  -Continue home metoprolol succinate     Hyperlipidemia  -Continue home atorvastatin     Chronic pain  -Stable  -Continue home Neurontin, Zanaflex, Norco (inspect verified)  -Continue home Lidoderm patch     Depression  -Chronic, stable  -Continue home Zoloft      Hypothyroidism  -Continue home levothyroxine     Restless leg syndrome  -Continue home Requip    Patient stable condition for discharge to SNF    DISCHARGE Follow Up Recommendations for labs and diagnostics:   -Outpatient EP follow-up to follow-up on PPM as directed by EP     Reasons For Change In Medications and Indications for New Medications:      Day of Discharge     Vital Signs:  Temp:  [97.3 °F (36.3 °C)-98.6 °F (37 °C)] 98.6 °F (37 °C)  Heart Rate:  [70-83] 70  Resp:  [12-17] 16  BP: (101-136)/(42-80) 118/47    Physical Exam:  Physical Exam     Constitutional:       General: She is not in acute distress.     Appearance: Normal appearance. She is not ill-appearing.   HENT:      Mouth/Throat:      Mouth: Mucous membranes are moist.   Eyes:      Extraocular Movements: Extraocular movements intact.      Pupils: Pupils are equal, round, and reactive to light.   Cardiovascular:      Rate and Rhythm: Normal rate. Rhythm regular.   Pulmonary:      Effort: Pulmonary effort is normal. No respiratory distress.      Breath sounds: No wheezing or rales.   Abdominal:      General: There is no distension.      Palpations: Abdomen is soft.      Tenderness: There is no abdominal tenderness.   Neurological:      General: No focal deficit present.      Mental Status: She is alert and oriented to person, place, and time.   Pertinent  and/or Most Recent Results     LAB RESULTS:      Lab 01/17/23  0157 01/16/23  0057  01/15/23  1453 01/15/23  1058 01/14/23 2022   WBC 4.90 5.40  --  4.30 6.90   HEMOGLOBIN 9.4* 9.8* 10.4* 9.7* 11.2*   HEMATOCRIT 30.7* 30.6* 33.8* 30.3* 36.3   PLATELETS 184 199  --  198 233   NEUTROS ABS 2.35 2.70  --  2.10 4.50   LYMPHS ABS  --  1.70  --  1.50 1.60   MONOS ABS  --  0.80  --  0.50 0.50   EOS ABS 0.10 0.20  --  0.20 0.10   MCV 84.3 83.6  --  82.9 83.7         Lab 01/18/23  2258 01/18/23  0031 01/17/23  0157 01/16/23  0057 01/15/23  1503 01/15/23  1058 01/14/23 2022   SODIUM  --   --  137 137 138 138 140   POTASSIUM 4.9 4.4 5.2 4.3 3.7 3.9 4.0   CHLORIDE  --   --  106 103 101 102 102   CO2  --   --  23.0 26.0 26.0 27.0 26.0   ANION GAP  --   --  8.0 8.0 11.0 9.0 12.0   BUN  --   --  21 21 18 17 10   CREATININE  --   --  0.93 0.93 0.85 0.91 0.86   EGFR  --   --  61.9 61.9 68.9 63.5 68.0   GLUCOSE  --   --  113* 97 131* 146* 107*   CALCIUM  --   --  7.7* 8.1* 8.7 8.7 9.6   MAGNESIUM 2.1 2.2 2.2 3.1*  --  1.9 1.9         Lab 01/14/23 2022   TOTAL PROTEIN 7.4   ALBUMIN 4.2   GLOBULIN 3.2   ALT (SGPT) 13   AST (SGOT) 17   BILIRUBIN 1.8*   ALK PHOS 147*         Lab 01/15/23  1058 01/14/23 2022   PROBNP  --  9,487.0*   TROPONIN T <0.010 <0.010                 Brief Urine Lab Results  (Last result in the past 365 days)      Color   Clarity   Blood   Leuk Est   Nitrite   Protein   CREAT   Urine HCG        01/14/23 2052 Yellow   Clear   Negative   Negative   Negative   30 mg/dL (1+)               Microbiology Results (last 10 days)     Procedure Component Value - Date/Time    Respiratory Panel PCR w/COVID-19(SARS-CoV-2) WENDI/MIKAYLA/KEVEN/PAD/COR/MAD/DWIGHT In-House, NP Swab in UTM/VTM, 3-4 HR TAT - Swab, Nasopharynx [[id number]]  (Normal) Collected: 01/14/23 2011    Lab Status: Final result Specimen: Swab from Nasopharynx Updated: 01/14/23 2105     ADENOVIRUS, PCR Not Detected     Coronavirus 229E Not Detected     Coronavirus HKU1 Not Detected     Coronavirus NL63 Not Detected     Coronavirus OC43 Not Detected      COVID19 Not Detected     Human Metapneumovirus Not Detected     Human Rhinovirus/Enterovirus Not Detected     Influenza A PCR Not Detected     Influenza B PCR Not Detected     Parainfluenza Virus 1 Not Detected     Parainfluenza Virus 2 Not Detected     Parainfluenza Virus 3 Not Detected     Parainfluenza Virus 4 Not Detected     RSV, PCR Not Detected     Bordetella pertussis pcr Not Detected     Bordetella parapertussis PCR Not Detected     Chlamydophila pneumoniae PCR Not Detected     Mycoplasma pneumo by PCR Not Detected    Narrative:      In the setting of a positive respiratory panel with a viral infection PLUS a negative procalcitonin without other underlying concern for bacterial infection, consider observing off antibiotics or discontinuation of antibiotics and continue supportive care. If the respiratory panel is positive for atypical bacterial infection (Bordetella pertussis, Chlamydophila pneumoniae, or Mycoplasma pneumoniae), consider antibiotic de-escalation to target atypical bacterial infection.          CT Abdomen Pelvis Without Contrast    Result Date: 1/6/2023  Impression: 1.No evidence for significant nephrolithiasis. There is no evidence for obstructive uropathy. 2.No evidence for acute abnormality throughout the abdomen or pelvis on this noncontrast exam. 3.Atelectasis versus infiltrates are noted in the lung bases. Electronically Signed: Russel Sanderson  1/6/2023 9:38 PM EST  Workstation ID: [id number]    XR Chest 1 View    Result Date: 1/17/2023  Impression: Impression: 1. Interval placement of dual-lead pacemaker. 2. Interval development of right midlung opacity which may reflect atelectasis or pneumonia. Electronically Signed: Velasquez Romano  1/17/2023 1:48 PM EST  Workstation ID: [id number]    XR Chest 1 View    Result Date: 1/14/2023  Impression: Impression: Stable chronic findings without acute process. Electronically Signed: Edil Joshua  1/14/2023 8:14 PM EST  Workstation ID: [id number]    XR  Chest 1 View    Result Date: 1/4/2023  Impression: Impression: 1. Stable cardiomegaly. 2. Elevated right hemidiaphragm. 3. No acute process in the chest Electronically Signed: Mark Steward  1/4/2023 7:13 PM EST  Workstation ID: [id number]    XR Abdomen KUB    Result Date: 1/6/2023  Impression: Impression: 1. No significant retained colonic stool burden is identified. Nonobstructive appearing bowel gas pattern. Electronically Signed: Suzanne Uriartegema  1/6/2023 2:53 PM EST  Workstation ID: [id number]      Results for orders placed during the hospital encounter of 03/04/22    Duplex Venous Lower Extremity - Bilateral CAR    Interpretation Summary  · Normal bilateral lower extremity venous duplex scan.  · Nonvascular cystic mass located in the left posterior mid calf.      Results for orders placed during the hospital encounter of 03/04/22    Duplex Venous Lower Extremity - Bilateral CAR    Interpretation Summary  · Normal bilateral lower extremity venous duplex scan.  · Nonvascular cystic mass located in the left posterior mid calf.      Results for orders placed during the hospital encounter of 01/04/23    Adult Transthoracic Echo Complete With Contrast if Necessary Per Protocol    Interpretation Summary  •  Left ventricular ejection fraction appears to be 36 - 40%.  •  There is calcification of the aortic valve.  •  Moderate mitral valve regurgitation is present.  •  Estimated right ventricular systolic pressure from tricuspid regurgitation is normal (<35 mmHg).  •  No pericardial effusion noted      Labs Pending at Discharge:      Procedures Performed  Procedure(s):  Pacemaker DC new         Consults:   Consults     Date and Time Order Name Status Description    1/15/2023 12:34 AM Inpatient Cardiology Consult Completed     1/14/2023  9:29 PM Hospitalist (on-call MD unless specified)              Discharge Details        Discharge Medications      New Medications      Instructions Start Date   cephalexin 500 MG  capsule  Commonly known as: Keflex   500 mg, Oral, 3 Times Daily      sotalol 80 MG tablet  Commonly known as: BETAPACE   80 mg, Oral, Every 12 Hours Scheduled         Changes to Medications      Instructions Start Date   apixaban 5 MG tablet tablet  Commonly known as: ELIQUIS  What changed: additional instructions   5 mg, Oral, Every 12 Hours Scheduled      sildenafil 20 MG tablet  Commonly known as: REVATIO  What changed:   how much to take  how to take this  when to take this   20 mg, Oral, Every 8 Hours Scheduled         Continue These Medications      Instructions Start Date   atorvastatin 10 MG tablet  Commonly known as: LIPITOR   10 mg, Oral, Nightly      digoxin 125 MCG tablet  Commonly known as: LANOXIN   125 mcg, Oral, Daily Digoxin      gabapentin 600 MG tablet  Commonly known as: NEURONTIN   TAKE 1 TABLET BY MOUTH FOUR TIMES DAILY      HYDROcodone-acetaminophen 7.5-325 MG per tablet  Commonly known as: NORCO   1 tablet, Oral, 4 Times Daily PRN, DNF before 11/2/2022      levothyroxine 88 MCG tablet  Commonly known as: SYNTHROID, LEVOTHROID   88 mcg, Oral, Daily      lidocaine 5 %  Commonly known as: LIDODERM   UNWRAP AND APPLY 2 PATCH(ES) ON THE SKIN DAILY AS DIRECTED BY PROVIDER. REMOVE AND DISCARD PATCH(ES) WITHIN 12 HOURS OR AS DIRECTED BY MD      loperamide 2 MG tablet  Commonly known as: IMODIUM A-D   2 mg, Oral, 4 Times Daily PRN      pantoprazole 40 MG EC tablet  Commonly known as: PROTONIX   40 mg, Oral, Daily With Lunch, Afternoons      rOPINIRole 0.25 MG tablet  Commonly known as: REQUIP   0.25 mg, Oral, Nightly, Take 1 hour before bedtime      sertraline 50 MG tablet  Commonly known as: ZOLOFT   50 mg, Oral, Nightly      tiZANidine 4 MG tablet  Commonly known as: ZANAFLEX   4 mg, Oral, 2 Times Daily PRN         Stop These Medications    cyanocobalamin 1000 MCG tablet  Commonly known as: VITAMIN B-12     famotidine 20 MG tablet  Commonly known as: PEPCID     metoprolol succinate  MG 24 hr  tablet  Commonly known as: TOPROL-XL            Allergies   Allergen Reactions   • Baclofen Rash   • Codeine Nausea Only   • Ibuprofen Unknown (See Comments)     Patient doesn't know----Motin   • Methocarbamol Unknown (See Comments)     Patient doesn't know   • Naproxen Unknown (See Comments)     Pt. Doesn't know    • Tizanidine Hcl Other (See Comments)     Syncope    • Tolmetin Dizziness     Pt. Doesn't know-- same as Tolectin         Discharge Disposition: SNF  Skilled Nursing Facility (DC - External)    Diet:  Hospital:  Diet Order   Procedures   • Diet: Cardiac Diets; Healthy Heart (2-3 Na+); Texture: Regular Texture (IDDSI 7); Fluid Consistency: Thin (IDDSI 0)         Discharge Activity:         CODE STATUS:  Code Status and Medical Interventions:   Ordered at: 01/14/23 2152     Code Status (Patient has no pulse and is not breathing):    CPR (Attempt to Resuscitate)     Medical Interventions (Patient has pulse or is breathing):    Full Support         No future appointments.    Additional Instructions for the Follow-ups that You Need to Schedule     Ambulatory Referral to Sleep Medicine   As directed      Follow-up needed: Yes               Time spent on Discharge including face to face service:  25 minutes        Signature: Electronically signed by Zack Cifuentes MD, 01/19/23, 9:36 AM EST.

## 2023-01-19 NOTE — THERAPY TREATMENT NOTE
"Subjective: Pt agreeable to therapeutic plan of care.    Objective:     Bed mobility - Supervision  Transfers - CGA and with rolling walker  Ambulation -20 feet SBA and with rolling walker   Therapeutic exercises: Quad sets,Hip abd,add,Ankle pumps,LAQ,Seated marching    Vitals: WNL    Pain: 4 VAS   Location: Chest (Incision site)  Intervention for pain: Repositioned and Therapeutic Presence    Education: Provided education on the importance of mobility in the acute care setting, Transfer Training and Gait Training    Assessment: Catalina Moreno presents with functional mobility impairments which indicate the need for skilled intervention. Tolerating session today without incident.Patient required SUP for Bed Mob and CGA and RW for transfers and SBA and RW for ambulation of around 20ft. Patient needed one rest break to catch the breath because of pain in the chest. Will continue to follow and progress as tolerated.     Plan/Recommendations:   Low Intensity Therapy recommended post-acute care - This is recommended as therapy feels this patient would require 2-3 visits per week. OP or HH would be the best option depending on patient's home bound status. Consider, if the patient has other  \"skilled\" needs such as wounds, IV antibiotics, etc. Combined with \"low intensity\" could also equate to a SNF. If patient is medically complex, consider LTAC.. Pt requires no DME at discharge.     Pt desires Home with Home Health at discharge. Pt cooperative; agreeable to therapeutic recommendations and plan of care.         Basic Mobility 6-click:  Rollin = Total, A lot = 2, A little = 3; 4 = None  Supine>Sit:   1 = Total, A lot = 2, A little = 3; 4 = None   Sit>Stand with arms:  1 = Total, A lot = 2, A little = 3; 4 = None  Bed>Chair:   1 = Total, A lot = 2, A little = 3; 4 = None  Ambulate in room:  1 = Total, A lot = 2, A little = 3; 4 = None  3-5 Steps with railin = Total, A lot = 2, A little = 3; 4 = " None  Score: 22    Modified [place]: N/A = No pre-op stroke/TIA    Post-Tx Position: Up in Chair, Alarms activated and Call light and personal items within reach  PPE: gloves and surgical mask

## 2023-01-19 NOTE — PLAN OF CARE
Goal Outcome Evaluation:  Plan of Care Reviewed With: patient        Progress: improving     s/p pacer. C/o pain increased today. Cards saw pt today and changed dressing on pacer site. [facility] accepted. Plan for d/c tomorrow. VSS. No complaints at this time.       Problem: Fall Injury Risk  Goal: Absence of Fall and Fall-Related Injury  Outcome: Ongoing, Progressing  Intervention: Identify and Manage Contributors  Recent Flowsheet Documentation  Taken 1/19/2023 1615 by Kimberly Izquierdo RN  Medication Review/Management: medications reviewed  Taken 1/19/2023 1445 by Kimberly Izquierdo RN  Medication Review/Management: medications reviewed  Taken 1/19/2023 1200 by Kimberly Izquierdo RN  Medication Review/Management: medications reviewed  Taken 1/19/2023 1000 by Kimberly Izquierdo RN  Medication Review/Management: medications reviewed  Taken 1/19/2023 0815 by Kimberly Izquierdo RN  Medication Review/Management: medications reviewed  Intervention: Promote Injury-Free Environment  Recent Flowsheet Documentation  Taken 1/19/2023 1615 by Kimberly Izquierdo RN  Safety Promotion/Fall Prevention:   safety round/check completed   room organization consistent   nonskid shoes/slippers when out of bed   fall prevention program maintained   clutter free environment maintained   assistive device/personal items within reach  Taken 1/19/2023 1445 by Kimberly Izquierdo RN  Safety Promotion/Fall Prevention:   safety round/check completed   room organization consistent   nonskid shoes/slippers when out of bed   fall prevention program maintained   clutter free environment maintained   assistive device/personal items within reach  Taken 1/19/2023 1200 by Kimberly Izquierdo RN  Safety Promotion/Fall Prevention:   safety round/check completed   room organization consistent   nonskid shoes/slippers when out of bed   fall prevention program maintained   clutter free environment maintained   assistive  device/personal items within reach  Taken 1/19/2023 1000 by Kimberly Izquierdo RN  Safety Promotion/Fall Prevention:   safety round/check completed   room organization consistent   nonskid shoes/slippers when out of bed   fall prevention program maintained   clutter free environment maintained   assistive device/personal items within reach  Taken 1/19/2023 0815 by Kimberly Izquierdo RN  Safety Promotion/Fall Prevention:   safety round/check completed   room organization consistent   nonskid shoes/slippers when out of bed   fall prevention program maintained   clutter free environment maintained   assistive device/personal items within reach     Problem: Adult Inpatient Plan of Care  Goal: Plan of Care Review  Outcome: Ongoing, Progressing  Flowsheets (Taken 1/19/2023 1759)  Progress: improving  Plan of Care Reviewed With: patient  Goal: Patient-Specific Goal (Individualized)  Outcome: Ongoing, Progressing  Goal: Absence of Hospital-Acquired Illness or Injury  Outcome: Ongoing, Progressing  Intervention: Identify and Manage Fall Risk  Recent Flowsheet Documentation  Taken 1/19/2023 1615 by Kimberly Izquierdo RN  Safety Promotion/Fall Prevention:   safety round/check completed   room organization consistent   nonskid shoes/slippers when out of bed   fall prevention program maintained   clutter free environment maintained   assistive device/personal items within reach  Taken 1/19/2023 1445 by Kimberly Izquierdo RN  Safety Promotion/Fall Prevention:   safety round/check completed   room organization consistent   nonskid shoes/slippers when out of bed   fall prevention program maintained   clutter free environment maintained   assistive device/personal items within reach  Taken 1/19/2023 1200 by Kimberly Izquierdo RN  Safety Promotion/Fall Prevention:   safety round/check completed   room organization consistent   nonskid shoes/slippers when out of bed   fall prevention program maintained   clutter free  environment maintained   assistive device/personal items within reach  Taken 1/19/2023 1000 by Kimberly Izquierdo RN  Safety Promotion/Fall Prevention:   safety round/check completed   room organization consistent   nonskid shoes/slippers when out of bed   fall prevention program maintained   clutter free environment maintained   assistive device/personal items within reach  Taken 1/19/2023 0815 by Kimberly Izquierdo RN  Safety Promotion/Fall Prevention:   safety round/check completed   room organization consistent   nonskid shoes/slippers when out of bed   fall prevention program maintained   clutter free environment maintained   assistive device/personal items within reach  Intervention: Prevent Skin Injury  Recent Flowsheet Documentation  Taken 1/19/2023 1615 by Kimberly Izquierdo RN  Body Position:   sitting up in bed   weight shifting  Skin Protection:   adhesive use limited   tubing/devices free from skin contact  Taken 1/19/2023 1445 by Kimberly Izquierdo RN  Body Position:   sitting up in bed   weight shifting  Taken 1/19/2023 1200 by Kimberly Izquierdo RN  Body Position:   position changed independently   sitting up in bed  Skin Protection:   adhesive use limited   tubing/devices free from skin contact  Taken 1/19/2023 1000 by Kimberly Izquierdo RN  Body Position:   position changed independently   supine  Taken 1/19/2023 0815 by Kimberly Izquierdo RN  Body Position:   position changed independently   tilted   right  Skin Protection:   adhesive use limited   tubing/devices free from skin contact  Intervention: Prevent and Manage VTE (Venous Thromboembolism) Risk  Recent Flowsheet Documentation  Taken 1/19/2023 1615 by Kimberly Izquierdo RN  Activity Management: activity adjusted per tolerance  Taken 1/19/2023 1445 by Kimberly Izquierdo RN  Activity Management: activity adjusted per tolerance  Taken 1/19/2023 1200 by Kimberly Izquierdo RN  Activity Management: activity adjusted  per tolerance  Taken 1/19/2023 1000 by Kimberly Izquierdo RN  Activity Management: activity adjusted per tolerance  Taken 1/19/2023 0815 by Kimberly Izquierdo RN  Activity Management: activity adjusted per tolerance  VTE Prevention/Management: (dorsiflexion) bilateral  Intervention: Prevent Infection  Recent Flowsheet Documentation  Taken 1/19/2023 1615 by Kimberly Izquierdo RN  Infection Prevention:   single patient room provided   personal protective equipment utilized   hand hygiene promoted  Taken 1/19/2023 1445 by Kimberly Izquierdo RN  Infection Prevention:   single patient room provided   personal protective equipment utilized   hand hygiene promoted  Taken 1/19/2023 1200 by Kimberly Izquierdo RN  Infection Prevention:   single patient room provided   personal protective equipment utilized   hand hygiene promoted  Taken 1/19/2023 1000 by Kimberly Izquierdo RN  Infection Prevention:   single patient room provided   personal protective equipment utilized   hand hygiene promoted  Taken 1/19/2023 0815 by Kimberly Izquierdo RN  Infection Prevention:   single patient room provided   personal protective equipment utilized   hand hygiene promoted  Goal: Optimal Comfort and Wellbeing  Outcome: Ongoing, Progressing  Intervention: Monitor Pain and Promote Comfort  Recent Flowsheet Documentation  Taken 1/19/2023 1615 by Kimberly Izquierdo RN  Pain Management Interventions:   care clustered   pain management plan reviewed with patient/caregiver   pillow support provided   position adjusted   quiet environment facilitated   relaxation techniques promoted  Taken 1/19/2023 1200 by Kimberly Izquierdo RN  Pain Management Interventions:   care clustered   pain management plan reviewed with patient/caregiver   pillow support provided   position adjusted   quiet environment facilitated   relaxation techniques promoted  Taken 1/19/2023 0815 by Kimberly Izquierdo RN  Pain Management Interventions:    care clustered   pain management plan reviewed with patient/caregiver   pillow support provided   position adjusted   quiet environment facilitated   relaxation techniques promoted  Intervention: Provide Person-Centered Care  Recent Flowsheet Documentation  Taken 1/19/2023 1615 by Kimberly Izquierdo RN  Trust Relationship/Rapport:   care explained   questions answered   questions encouraged  Taken 1/19/2023 1200 by Kimberly Izquierdo RN  Trust Relationship/Rapport:   care explained   questions answered   questions encouraged  Taken 1/19/2023 0815 by Kimberly Izquierdo RN  Trust Relationship/Rapport:   care explained   questions answered   questions encouraged  Goal: Readiness for Transition of Care  Outcome: Ongoing, Progressing     Problem: Asthma Comorbidity  Goal: Maintenance of Asthma Control  Outcome: Ongoing, Progressing  Intervention: Maintain Asthma Symptom Control  Recent Flowsheet Documentation  Taken 1/19/2023 1615 by Kimberly Izquierdo RN  Medication Review/Management: medications reviewed  Taken 1/19/2023 1445 by Kimberly Izquierdo RN  Medication Review/Management: medications reviewed  Taken 1/19/2023 1200 by Kimberly Izquierdo RN  Medication Review/Management: medications reviewed  Taken 1/19/2023 1000 by Kimberly Izquierdo RN  Medication Review/Management: medications reviewed  Taken 1/19/2023 0815 by Kimberly Izquierdo RN  Medication Review/Management: medications reviewed     Problem: Behavioral Health Comorbidity  Goal: Maintenance of Behavioral Health Symptom Control  Outcome: Ongoing, Progressing  Intervention: Maintain Behavioral Health Symptom Control  Recent Flowsheet Documentation  Taken 1/19/2023 1615 by Kimberly Izquierdo RN  Medication Review/Management: medications reviewed  Taken 1/19/2023 1445 by Kimberly Izquierdo RN  Medication Review/Management: medications reviewed  Taken 1/19/2023 1200 by Kimberly Izquierdo RN  Medication Review/Management:  medications reviewed  Taken 1/19/2023 1000 by Kimberly Izquierdo RN  Medication Review/Management: medications reviewed  Taken 1/19/2023 0815 by Kimberly Izquierdo RN  Medication Review/Management: medications reviewed     Problem: COPD (Chronic Obstructive Pulmonary Disease) Comorbidity  Goal: Maintenance of COPD Symptom Control  Outcome: Ongoing, Progressing  Intervention: Maintain COPD-Symptom Control  Recent Flowsheet Documentation  Taken 1/19/2023 1615 by Kimberly Izquierdo RN  Supportive Measures: active listening utilized  Medication Review/Management: medications reviewed  Taken 1/19/2023 1445 by Kimberly Izquierdo RN  Medication Review/Management: medications reviewed  Taken 1/19/2023 1200 by Kimberly Izquierdo RN  Supportive Measures: active listening utilized  Medication Review/Management: medications reviewed  Taken 1/19/2023 1000 by Kimberly Izquierdo RN  Medication Review/Management: medications reviewed  Taken 1/19/2023 0815 by Kimberly Izquierdo RN  Supportive Measures: active listening utilized  Medication Review/Management: medications reviewed     Problem: Diabetes Comorbidity  Goal: Blood Glucose Level Within Targeted Range  Outcome: Ongoing, Progressing     Problem: Heart Failure Comorbidity  Goal: Maintenance of Heart Failure Symptom Control  Outcome: Ongoing, Progressing  Intervention: Maintain Heart Failure-Management  Recent Flowsheet Documentation  Taken 1/19/2023 1615 by Kimberly Izquierdo RN  Medication Review/Management: medications reviewed  Taken 1/19/2023 1445 by Kimberly Izquierdo RN  Medication Review/Management: medications reviewed  Taken 1/19/2023 1200 by Kimberly Izquierdo RN  Medication Review/Management: medications reviewed  Taken 1/19/2023 1000 by Kimberly Izquierdo RN  Medication Review/Management: medications reviewed  Taken 1/19/2023 0815 by Kimberly Izquierdo RN  Medication Review/Management: medications reviewed     Problem:  Hypertension Comorbidity  Goal: Blood Pressure in Desired Range  Outcome: Ongoing, Progressing  Intervention: Maintain Blood Pressure Management  Recent Flowsheet Documentation  Taken 1/19/2023 1615 by Kimberly Izquierdo RN  Syncope Management: position changed slowly  Medication Review/Management: medications reviewed  Taken 1/19/2023 1445 by Kimberly Izquierdo RN  Medication Review/Management: medications reviewed  Taken 1/19/2023 1200 by Kimberly Izquierdo RN  Syncope Management: position changed slowly  Medication Review/Management: medications reviewed  Taken 1/19/2023 1000 by Kimberly Izquierdo RN  Medication Review/Management: medications reviewed  Taken 1/19/2023 0815 by Kimberly Izquierdo RN  Syncope Management: position changed slowly  Medication Review/Management: medications reviewed     Problem: Obstructive Sleep Apnea Risk or Actual Comorbidity Management  Goal: Unobstructed Breathing During Sleep  Outcome: Ongoing, Progressing     Problem: Osteoarthritis Comorbidity  Goal: Maintenance of Osteoarthritis Symptom Control  Outcome: Ongoing, Progressing  Intervention: Maintain Osteoarthritis Symptom Control  Recent Flowsheet Documentation  Taken 1/19/2023 1615 by Kimberly Izquierdo RN  Activity Management: activity adjusted per tolerance  Medication Review/Management: medications reviewed  Taken 1/19/2023 1445 by Kimberly Izquierdo RN  Activity Management: activity adjusted per tolerance  Medication Review/Management: medications reviewed  Taken 1/19/2023 1200 by Kimberly Izquierdo RN  Activity Management: activity adjusted per tolerance  Medication Review/Management: medications reviewed  Taken 1/19/2023 1000 by Kimberly Izquierdo RN  Activity Management: activity adjusted per tolerance  Medication Review/Management: medications reviewed  Taken 1/19/2023 0815 by Kimberly Izquierdo RN  Activity Management: activity adjusted per tolerance  Medication Review/Management:  medications reviewed     Problem: Pain Chronic (Persistent) (Comorbidity Management)  Goal: Acceptable Pain Control and Functional Ability  Outcome: Ongoing, Progressing  Intervention: Manage Persistent Pain  Recent Flowsheet Documentation  Taken 1/19/2023 1615 by Kimberly Izquierdo RN  Sleep/Rest Enhancement: consistent schedule promoted  Medication Review/Management: medications reviewed  Taken 1/19/2023 1445 by Kimberly Izquierdo RN  Medication Review/Management: medications reviewed  Taken 1/19/2023 1200 by Kimberly Izquierdo RN  Sleep/Rest Enhancement:   consistent schedule promoted   relaxation techniques promoted  Medication Review/Management: medications reviewed  Taken 1/19/2023 1000 by Kimberly Izquierdo RN  Medication Review/Management: medications reviewed  Taken 1/19/2023 0815 by Kimberly Izquierdo RN  Sleep/Rest Enhancement: consistent schedule promoted  Medication Review/Management: medications reviewed  Intervention: Develop Pain Management Plan  Recent Flowsheet Documentation  Taken 1/19/2023 1615 by Kimberly Izquierdo RN  Pain Management Interventions:   care clustered   pain management plan reviewed with patient/caregiver   pillow support provided   position adjusted   quiet environment facilitated   relaxation techniques promoted  Taken 1/19/2023 1200 by Kimberly Izquierdo RN  Pain Management Interventions:   care clustered   pain management plan reviewed with patient/caregiver   pillow support provided   position adjusted   quiet environment facilitated   relaxation techniques promoted  Taken 1/19/2023 0815 by Kimberly Izquierdo RN  Pain Management Interventions:   care clustered   pain management plan reviewed with patient/caregiver   pillow support provided   position adjusted   quiet environment facilitated   relaxation techniques promoted  Intervention: Optimize Psychosocial Wellbeing  Recent Flowsheet Documentation  Taken 1/19/2023 1615 by Kimberly Izquierdo  RN  Supportive Measures: active listening utilized  Diversional Activities: television  Family/Support System Care:   self-care encouraged   support provided  Taken 1/19/2023 1200 by Kimberly Izquierdo RN  Supportive Measures: active listening utilized  Diversional Activities: television  Family/Support System Care:   self-care encouraged   support provided  Taken 1/19/2023 0815 by Kimberly Izquierdo RN  Supportive Measures: active listening utilized  Diversional Activities: television  Family/Support System Care:   self-care encouraged   support provided     Problem: Seizure Disorder Comorbidity  Goal: Maintenance of Seizure Control  Outcome: Ongoing, Progressing     Problem: Skin Injury Risk Increased  Goal: Skin Health and Integrity  Outcome: Ongoing, Progressing  Intervention: Optimize Skin Protection  Recent Flowsheet Documentation  Taken 1/19/2023 1615 by Kimberly Izquierdo RN  Pressure Reduction Techniques: frequent weight shift encouraged  Head of Bed (HOB) Positioning: HOB elevated  Pressure Reduction Devices: pressure-redistributing mattress utilized  Skin Protection:   adhesive use limited   tubing/devices free from skin contact  Taken 1/19/2023 1445 by Kimberly Izquierdo RN  Head of Bed (HOB) Positioning: HOB elevated  Taken 1/19/2023 1200 by Kimberly Izquierdo RN  Pressure Reduction Techniques: frequent weight shift encouraged  Head of Bed (HOB) Positioning: HOB elevated  Pressure Reduction Devices: pressure-redistributing mattress utilized  Skin Protection:   adhesive use limited   tubing/devices free from skin contact  Taken 1/19/2023 1000 by Kimberly Izquierdo RN  Head of Bed (HOB) Positioning: HOB elevated  Taken 1/19/2023 0815 by Kimberly Izquierdo RN  Pressure Reduction Techniques: frequent weight shift encouraged  Head of Bed (HOB) Positioning: HOB elevated  Pressure Reduction Devices: pressure-redistributing mattress utilized  Skin Protection:   adhesive use limited    tubing/devices free from skin contact

## 2023-01-19 NOTE — PROGRESS NOTES
Referring Provider: Hospitalist    Reason for follow-up: Atrial fibrillation     Patient Care Team:  Rubio Dover MD as PCP - General (Family Medicine)  Flash Gonzalez MD as Consulting Physician (Cardiology)    Subjective .  Patient is doing well without any symptoms today.    Objective Sitting in chair comfortably.  Patient had a pacemaker     Review of Systems   Constitutional: Negative for fever and malaise/fatigue.   HENT: Negative for ear pain and nosebleeds.    Eyes: Negative for blurred vision and double vision.   Cardiovascular: Negative for chest pain, dyspnea on exertion and palpitations.   Respiratory: Negative for cough and shortness of breath.    Skin: Negative for rash.   Musculoskeletal: Negative for joint pain.   Gastrointestinal: Negative for abdominal pain, nausea and vomiting.   Neurological: Negative for focal weakness and headaches.   Psychiatric/Behavioral: Negative for depression. The patient is not nervous/anxious.    All other systems reviewed and are negative.      Baclofen, Codeine, Ibuprofen, Methocarbamol, Naproxen, Tizanidine hcl, and Tolmetin    Scheduled Meds:apixaban, 5 mg, Oral, Q12H  atorvastatin, 10 mg, Oral, Nightly  digoxin, 125 mcg, Oral, Daily  gabapentin, 600 mg, Oral, 4x Daily  levothyroxine, 88 mcg, Oral, Q AM  lidocaine, 2 patch, Transdermal, Q24H  pantoprazole, 40 mg, Oral, Daily With Lunch  rOPINIRole, 0.25 mg, Oral, Nightly  senna-docusate sodium, 2 tablet, Oral, BID  sertraline, 50 mg, Oral, Nightly  sildenafil, 20 mg, Oral, Q8H  sodium chloride, 10 mL, Intravenous, Q12H  sotalol, 80 mg, Oral, Q12H      Continuous Infusions:   PRN Meds:.•  acetaminophen **OR** acetaminophen **OR** acetaminophen  •  senna-docusate sodium **AND** polyethylene glycol **AND** bisacodyl **AND** bisacodyl  •  guaifenesin  •  HYDROcodone-acetaminophen  •  loperamide  •  magnesium sulfate **OR** magnesium sulfate **OR** magnesium sulfate  •  melatonin  •  Morphine **AND**  "naloxone  •  ondansetron  •  potassium chloride **OR** potassium chloride  •  [COMPLETED] Insert Peripheral IV **AND** sodium chloride  •  sodium chloride  •  sodium chloride  •  tiZANidine        VITAL SIGNS  Vitals:    01/19/23 0508 01/19/23 0610 01/19/23 0850 01/19/23 0957   BP: 123/58 123/58 118/47 110/53   BP Location: Right arm   Right arm   Patient Position: Lying   Lying   Pulse: 70  70 70   Resp: 16   14   Temp: 98.6 °F (37 °C)   98.5 °F (36.9 °C)   TempSrc: Oral   Oral   SpO2: 93%   94%   Weight: 75.8 kg (167 lb 1.7 oz)      Height:           Flowsheet Rows    Flowsheet Row First Filed Value   Admission Height 160 cm (63\") Documented at 01/14/2023 1927   Admission Weight 79.4 kg (175 lb) Documented at 01/14/2023 1927           TELEMETRY: Sinus bradycardia with PACs    Physical Exam:  Constitutional:       Appearance: Well-developed.   Eyes:      General: No scleral icterus.     Conjunctiva/sclera: Conjunctivae normal.      Pupils: Pupils are equal, round, and reactive to light.   HENT:      Head: Normocephalic and atraumatic.   Neck:      Vascular: No carotid bruit or JVD.   Pulmonary:      Effort: Pulmonary effort is normal.      Breath sounds: Normal breath sounds. No wheezing. No rales.   Cardiovascular:      Normal rate. Regular rhythm.   Pulses:     Intact distal pulses.   Abdominal:      General: Bowel sounds are normal.      Palpations: Abdomen is soft.   Musculoskeletal: Normal range of motion.      Cervical back: Normal range of motion and neck supple. Skin:     General: Skin is warm and dry.      Findings: No rash.   Neurological:      Mental Status: Alert.      Comments: No focal deficits          Results Review:   I reviewed the patient's new clinical results.  Lab Results (last 24 hours)     Procedure Component Value Units Date/Time    Magnesium [[id number]]  (Normal) Collected: 01/18/23 2258    Specimen: Blood Updated: 01/18/23 2338     Magnesium 2.1 mg/dL     Potassium [[id number]]  (Normal) " Collected: 01/18/23 2258    Specimen: Blood Updated: 01/18/23 2338     Potassium 4.9 mmol/L      Comment: Slight hemolysis detected by analyzer. Results may be affected.             Imaging Results (Last 24 Hours)     ** No results found for the last 24 hours. **          EKG      I personally viewed and interpreted the patient's EKG/Telemetry data:    ECHOCARDIOGRAM:    STRESS MYOVIEW:    CARDIAC CATHETERIZATION:    OTHER:         Assessment & Plan     Principal Problem:    Atrial fibrillation with RVR ([facility])  Active Problems:    Depressive disorder    Hypothyroidism    Stage 3a chronic kidney disease ([facility])    Chronic low back pain    Generalized anxiety disorder    Polyneuropathy, unspecified    Restless legs syndrome    Paroxysmal atrial fibrillation ([facility])    Essential (primary) hypertension    Pulmonary hypertension, unspecified ([facility])    Chronic systolic CHF (congestive heart failure) (Formerly [facility])    Coronary artery disease involving native coronary artery of native heart without angina pectoris    Moderate mitral regurgitation    Class 1 obesity due to excess calories with serious comorbidity and body mass index (BMI) of 30.0 to 30.9 in adult    Hypotension    Sick sinus syndrome (HCC)       Patient presented with the palpitations and shortness of breath and had atrial fibrillation with rapid response  Patient was seen yesterday by my colleague and was placed on Tikosyn  Patient converted to sinus rhythm significant bradycardia and asymptomatic.  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  Patient was seen by  electrophysiologist  Patient had a permanent pacemaker done and is doing well  Patient's Tikosyn was dropped because of prolonged QT interval.  Patient is started on sotalol and digoxin  Her Eliquis has been resumed  Patient is tolerating sotalol and digoxin very well and her QT interval is staying within normal limits  Patient will be transferred to rehab in the morning.    I discussed the patients findings and my recommendations with patient and nurse    Flash Gonzalez MD  01/19/23  12:51 EST

## 2023-01-19 NOTE — CASE MANAGEMENT/SOCIAL WORK
Continued Stay Note  [facility]     Patient Name: Catalina Moreno  MRN: [id number]  Today's Date: 1/19/2023    Admit Date: 1/14/2023    Plan: D/C Plan: [facility] accepted. No precert required. PASRR per facility. [facility] to transport 1/20 12pm.   Discharge Plan     Row Name 01/19/23 1148       Plan    Plan D/C Plan: [facility] accepted. No precert required. PASRR per facility. [facility] to transport 1/20 12pm.    Patient/Family in Agreement with Plan yes    Plan Comments  spoke to patient at bedside wearing mask and keeping distance greater than 6 feet and spent less than 15 minutes in room. Patient agreeable to [facility] but does not have friends or family to transport. CM contacted [facility] liaison who states they can transport tomorrow (1/20) at 12pm. CM updated RN and MD, avoidable day placed.                    Expected Discharge Date and Time     Expected Discharge Date Expected Discharge Time    Jan 20, 2023         Met with patient in room wearing PPE: mask.    Maintained distance greater than six feet and spent less than 15 minutes in the room.        KIMMY SherN, RN    [address] [address]    Office: [phone]  Fax: [phone]

## 2023-01-20 VITALS
HEART RATE: 70 BPM | RESPIRATION RATE: 12 BRPM | OXYGEN SATURATION: 95 % | BODY MASS INDEX: 29.69 KG/M2 | SYSTOLIC BLOOD PRESSURE: 119 MMHG | TEMPERATURE: 97.7 F | WEIGHT: 167.55 LBS | DIASTOLIC BLOOD PRESSURE: 52 MMHG | HEIGHT: 63 IN

## 2023-01-20 LAB
MAGNESIUM SERPL-MCNC: 1.9 MG/DL (ref 1.6–2.4)
POTASSIUM SERPL-SCNC: 4.6 MMOL/L (ref 3.5–5.2)

## 2023-01-20 PROCEDURE — 93005 ELECTROCARDIOGRAM TRACING: CPT | Performed by: INTERNAL MEDICINE

## 2023-01-20 PROCEDURE — 99232 SBSQ HOSP IP/OBS MODERATE 35: CPT | Performed by: INTERNAL MEDICINE

## 2023-01-20 PROCEDURE — 93010 ELECTROCARDIOGRAM REPORT: CPT | Performed by: INTERNAL MEDICINE

## 2023-01-20 RX ADMIN — GABAPENTIN 600 MG: 600 TABLET, FILM COATED ORAL at 09:25

## 2023-01-20 RX ADMIN — HYDROCODONE BITARTRATE AND ACETAMINOPHEN 1 TABLET: 7.5; 325 TABLET ORAL at 04:51

## 2023-01-20 RX ADMIN — LEVOTHYROXINE SODIUM 88 MCG: 0.09 TABLET ORAL at 04:52

## 2023-01-20 RX ADMIN — SOTALOL HYDROCHLORIDE 80 MG: 80 TABLET ORAL at 09:25

## 2023-01-20 RX ADMIN — HYDROCODONE BITARTRATE AND ACETAMINOPHEN 1 TABLET: 7.5; 325 TABLET ORAL at 11:43

## 2023-01-20 RX ADMIN — SILDENAFIL CITRATE 20 MG: 20 TABLET ORAL at 04:51

## 2023-01-20 RX ADMIN — APIXABAN 5 MG: 5 TABLET, FILM COATED ORAL at 09:25

## 2023-01-20 RX ADMIN — PANTOPRAZOLE SODIUM 40 MG: 40 TABLET, DELAYED RELEASE ORAL at 11:43

## 2023-01-20 RX ADMIN — DIGOXIN 125 MCG: 125 TABLET ORAL at 11:43

## 2023-01-20 RX ADMIN — GABAPENTIN 600 MG: 600 TABLET, FILM COATED ORAL at 11:43

## 2023-01-20 RX ADMIN — SENNOSIDES AND DOCUSATE SODIUM 2 TABLET: 50; 8.6 TABLET ORAL at 09:25

## 2023-01-20 NOTE — PLAN OF CARE
Goal Outcome Evaluation:           Progress: no change     Patient remains alert and able to make needs known. Patient continues to complain of back pain relieved by prn pain medications. Patient removed sling around 0000 while staff was assisting her up to the bedside commode. Patient educated on need for sling however continues to insist she leave it off for a while opposed to staff adjusting sling for comfort. No complaints of shortness of air or acute distress at this time. Call light remains in reach.

## 2023-01-20 NOTE — PLAN OF CARE
Problem: Fall Injury Risk  Goal: Absence of Fall and Fall-Related Injury  Outcome: Met     Problem: Adult Inpatient Plan of Care  Goal: Plan of Care Review  Outcome: Met  Flowsheets (Taken 1/20/2023 1145)  Progress: improving  Plan of Care Reviewed With: patient  Goal: Patient-Specific Goal (Individualized)  Outcome: Met  Goal: Absence of Hospital-Acquired Illness or Injury  Outcome: Met  Goal: Optimal Comfort and Wellbeing  Outcome: Met  Goal: Readiness for Transition of Care  Outcome: Met     Problem: Asthma Comorbidity  Goal: Maintenance of Asthma Control  Outcome: Met     Problem: Behavioral Health Comorbidity  Goal: Maintenance of Behavioral Health Symptom Control  Outcome: Met     Problem: COPD (Chronic Obstructive Pulmonary Disease) Comorbidity  Goal: Maintenance of COPD Symptom Control  Outcome: Met     Problem: Diabetes Comorbidity  Goal: Blood Glucose Level Within Targeted Range  Outcome: Met     Problem: Heart Failure Comorbidity  Goal: Maintenance of Heart Failure Symptom Control  Outcome: Met     Problem: Hypertension Comorbidity  Goal: Blood Pressure in Desired Range  Outcome: Met     Problem: Obstructive Sleep Apnea Risk or Actual Comorbidity Management  Goal: Unobstructed Breathing During Sleep  Outcome: Met     Problem: Osteoarthritis Comorbidity  Goal: Maintenance of Osteoarthritis Symptom Control  Outcome: Met     Problem: Pain Chronic (Persistent) (Comorbidity Management)  Goal: Acceptable Pain Control and Functional Ability  Outcome: Met     Problem: Seizure Disorder Comorbidity  Goal: Maintenance of Seizure Control  Outcome: Met     Problem: Skin Injury Risk Increased  Goal: Skin Health and Integrity  Outcome: Met   Goal Outcome Evaluation:  Plan of Care Reviewed With: patient        Progress: improving

## 2023-01-20 NOTE — CASE MANAGEMENT/SOCIAL WORK
Continued Stay Note  [facility]     Patient Name: Catalina Moreno  MRN: [id number]  Today's Date: 1/20/2023    Admit Date: 1/14/2023    Plan: D/C Plan: [facility] accepted. No precert required. PASRR per facility. [facility] to transport today at 12pm.   Discharge Plan     Row Name 01/20/23 1133       Plan    Plan D/C Plan: [facility] accepted. No precert required. PASRR per facility. [facility] to transport today at 12pm.    Plan Comments CM confirmed with [facility] liaison that facility van will transport patient today at 12pm. CM updated RN.    Final Discharge Disposition Code 03 - skilled nursing facility (SNF)    Final Note [facility]                    Expected Discharge Date and Time     Expected Discharge Date Expected Discharge Time    Jan 20, 2023         Case Management Discharge Note      Final Note: [facility]    Provided Post Acute Provider List?: Yes  Post Acute Provider List: Nursing Home  Provided Post Acute Provider Quality & Resource List?: Yes  Post Acute Provider Quality and Resource List: Nursing Home  Delivered To: Patient  Method of Delivery: In person    Selected Continued Care - Admitted Since 1/14/2023     Destination Coordination complete.    Service Provider Selected Services Address Phone Fax Patient Preferred    [facility] [address] [place] [address] [place] [id number] [phone]                  Transportation Services  W/C Van: [facility]    Final Discharge Disposition Code: 03 - skilled nursing facility (SNF)    Phone communication or documentation only - no physical contact with patient or family.    CASTRO Sher, RN    [address] [address]    Office: [phone]  Fax: [phone]

## 2023-01-20 NOTE — PROGRESS NOTES
"Referring Provider: Hospitalist    Reason for follow-up: Atrial fibrillation     Patient Care Team:  Rubio Dover MD as PCP - General (Family Medicine)  Flash Gonzalez MD as Consulting Physician (Cardiology)    Subjective .  Patient is doing well without any symptoms today.    Objective Sitting in chair comfortably.  Patient had a pacemaker     Review of Systems   Constitutional: Negative for fever and malaise/fatigue.   HENT: Negative for ear pain and nosebleeds.    Eyes: Negative for blurred vision and double vision.   Cardiovascular: Negative for chest pain, dyspnea on exertion and palpitations.   Respiratory: Negative for cough and shortness of breath.    Skin: Negative for rash.   Musculoskeletal: Negative for joint pain.   Gastrointestinal: Negative for abdominal pain, nausea and vomiting.   Neurological: Negative for focal weakness and headaches.   Psychiatric/Behavioral: Negative for depression. The patient is not nervous/anxious.    All other systems reviewed and are negative.      Baclofen, Codeine, Ibuprofen, Methocarbamol, Naproxen, Tizanidine hcl, and Tolmetin    Scheduled Meds:  Continuous Infusions:No current facility-administered medications for this encounter.    PRN Meds:.        VITAL SIGNS  Vitals:    01/20/23 0500 01/20/23 0501 01/20/23 0853 01/20/23 1143   BP:  106/59 120/52 119/52   BP Location:  Right arm Right arm    Patient Position:  Lying Lying    Pulse:  71 70 70   Resp:  14 12    Temp:  97.1 °F (36.2 °C) 97.7 °F (36.5 °C)    TempSrc:  Oral Oral    SpO2:  94% 95%    Weight: 76 kg (167 lb 8.8 oz)      Height:           Flowsheet Rows    Flowsheet Row First Filed Value   Admission Height 160 cm (63\") Documented at 01/14/2023 1927   Admission Weight 79.4 kg (175 lb) Documented at 01/14/2023 1927           TELEMETRY: Sinus bradycardia with PACs    Physical Exam:  Constitutional:       Appearance: Well-developed.   Eyes:      General: No scleral icterus.     Conjunctiva/sclera: " Conjunctivae normal.      Pupils: Pupils are equal, round, and reactive to light.   HENT:      Head: Normocephalic and atraumatic.   Neck:      Vascular: No carotid bruit or JVD.   Pulmonary:      Effort: Pulmonary effort is normal.      Breath sounds: Normal breath sounds. No wheezing. No rales.   Cardiovascular:      Normal rate. Regular rhythm.   Pulses:     Intact distal pulses.   Abdominal:      General: Bowel sounds are normal.      Palpations: Abdomen is soft.   Musculoskeletal: Normal range of motion.      Cervical back: Normal range of motion and neck supple. Skin:     General: Skin is warm and dry.      Findings: No rash.   Neurological:      Mental Status: Alert.      Comments: No focal deficits          Results Review:   I reviewed the patient's new clinical results.  Lab Results (last 24 hours)     Procedure Component Value Units Date/Time    Magnesium [[id number]]  (Normal) Collected: 01/19/23 2321    Specimen: Blood Updated: 01/20/23 0008     Magnesium 1.9 mg/dL     Potassium [[id number]]  (Normal) Collected: 01/19/23 2321    Specimen: Blood Updated: 01/20/23 0008     Potassium 4.6 mmol/L           Imaging Results (Last 24 Hours)     ** No results found for the last 24 hours. **          EKG      I personally viewed and interpreted the patient's EKG/Telemetry data:    ECHOCARDIOGRAM:    STRESS MYOVIEW:    CARDIAC CATHETERIZATION:    OTHER:         Assessment & Plan     Principal Problem:    Atrial fibrillation with RVR ([facility])  Active Problems:    Depressive disorder    Hypothyroidism    Stage 3a chronic kidney disease ([facility])    Chronic low back pain    Generalized anxiety disorder    Polyneuropathy, unspecified    Restless legs syndrome    Paroxysmal atrial fibrillation ([facility])    Essential (primary) hypertension    Pulmonary hypertension, unspecified ([facility])    Chronic systolic CHF (congestive heart failure) ([facility])    Coronary artery disease involving native coronary artery of native heart without angina  pectoris    Moderate mitral regurgitation    Class 1 obesity due to excess calories with serious comorbidity and body mass index (BMI) of 30.0 to 30.9 in adult    Hypotension    Sick sinus syndrome (HCC)       Patient presented with the palpitations and shortness of breath and had atrial fibrillation with rapid response  Patient was seen yesterday by my colleague and was placed on Tikosyn  Patient converted to sinus rhythm significant bradycardia and asymptomatic.  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  Patient was seen by electrophysiologist  Patient had a permanent pacemaker done and is doing well  Patient's Tikosyn was dropped because of prolonged QT interval.  Patient is started on sotalol and digoxin  Her Eliquis has been resumed  Patient is tolerating sotalol and digoxin very well and her QT interval is staying within normal limits  And is being transferred to rehab today and will be followed in the office in 1 to 2 weeks.    I discussed the patients findings and my recommendations with patient and nurse    Flash Gonzalez MD  01/20/23  15:59 EST

## 2023-01-23 LAB
QT INTERVAL: 349 MS
QT INTERVAL: 458 MS
QT INTERVAL: 462 MS
QT INTERVAL: 464 MS
QT INTERVAL: 467 MS
QT INTERVAL: 467 MS
QT INTERVAL: 471 MS

## 2023-01-30 LAB — QT INTERVAL: 468 MS

## 2023-02-15 ENCOUNTER — OFFICE VISIT (OUTPATIENT)
Dept: CARDIOLOGY | Facility: CLINIC | Age: 83
End: 2023-02-15
Payer: MEDICARE

## 2023-02-15 VITALS
HEIGHT: 63 IN | OXYGEN SATURATION: 97 % | SYSTOLIC BLOOD PRESSURE: 143 MMHG | BODY MASS INDEX: 27.11 KG/M2 | WEIGHT: 153 LBS | DIASTOLIC BLOOD PRESSURE: 82 MMHG | HEART RATE: 97 BPM

## 2023-02-15 DIAGNOSIS — I25.10 CORONARY ARTERY DISEASE INVOLVING NATIVE CORONARY ARTERY OF NATIVE HEART WITHOUT ANGINA PECTORIS: Chronic | ICD-10-CM

## 2023-02-15 DIAGNOSIS — I48.0 PAROXYSMAL ATRIAL FIBRILLATION: Primary | Chronic | ICD-10-CM

## 2023-02-15 DIAGNOSIS — I10 ESSENTIAL (PRIMARY) HYPERTENSION: Chronic | ICD-10-CM

## 2023-02-15 DIAGNOSIS — I48.91 ATRIAL FIBRILLATION WITH RVR: ICD-10-CM

## 2023-02-15 DIAGNOSIS — I49.5 SICK SINUS SYNDROME: ICD-10-CM

## 2023-02-15 DIAGNOSIS — Z95.0 PRESENCE OF CARDIAC PACEMAKER: ICD-10-CM

## 2023-02-15 PROCEDURE — 99214 OFFICE O/P EST MOD 30 MIN: CPT | Performed by: INTERNAL MEDICINE

## 2023-02-15 NOTE — PROGRESS NOTES
HP      Name: Catalina Moreno ADMIT: (Not on file)   : 1940  PCP: Rubio Dover MD    MRN: [id number] LOS: 0 days   AGE/SEX: 82 y.o. female  ROOM: Room/bed info not found     Chief Complaint   Patient presents with   • Hypertension   • Coronary Artery Disease   • Atrial Fibrillation   • Congestive Heart Failure   • New Pacemaker       Subjective        History of present illness  Catalina Moreno is an 82-year-old male patient who is here today to establish care.  She has sick sinus syndrome, as well as moderate cardiomyopathy.  Patient was admitted to the hospital in 2023 for A-fib with rapid ventricular rates, apparently she had tachybradycardia syndrome and she received a pacemaker in 2023.  She is here today for follow-up from her hospitalization.  She denies any symptoms of chest pain or shortness of breath, she could not give me any details regarding her recent hospitalization.  Review of notes indicated that she was started on Tikosyn for A-fib but due to prolonged QT, it was switched to sotalol 80 mg twice a day along with digoxin 125 once a day.  She was placed on Eliquis for anticoagulation.    Past Medical History:   Diagnosis Date   • Acute kidney injury (HCC) 2022   • Anxiety    • Arthritis    • Atrial fibrillation (HCC)     paroxysmal   • Broken shoulder     left-- due to fall 20 was at [facility]   • Chronic systolic CHF (congestive heart failure) ([facility]) 2023    EF 36-40%   • Coronary artery disease involving native coronary artery of native heart without angina pectoris 2021    nonobstructive   • DDD (degenerative disc disease), lumbar    • Depression    • Edema     2020 foot   • GERD (gastroesophageal reflux disease)    • H/O fall    • Heart failure with mid-range ejection fraction (HCC) 2022    EF 45% per 2D echo   • Hypertension    • Hypothyroidism    • Insomnia    • Low back pain    • Moderate mitral regurgitation 2023   •  Neuropathy    • Pain in both feet    • PONV (postoperative nausea and vomiting)    • Pulmonary hypertension (HCC)    • Radiculopathy    • Restless legs    • Spondylolisthesis    • Stage 3a chronic kidney disease (HCC)    • Urinary incontinence      Past Surgical History:   Procedure Laterality Date   • BACK SURGERY  07/19/2018    PDC &  PSF L3-L5 insitu   • CARDIAC CATHETERIZATION N/A 4/2/2021    Procedure: Cardiac Catheterization/Vascular Study;  Surgeon: Barrett Evans MD;  Location:  [facility];  Service: Cardiovascular;  Laterality: N/A;   • CARDIAC ELECTROPHYSIOLOGY PROCEDURE N/A 1/17/2023    Procedure: Pacemaker DC new;  Surgeon: Baljeet Valero MD;  Location:  [facility];  Service: Cardiovascular;  Laterality: N/A;   • CHOLECYSTECTOMY     • COLONOSCOPY     • HYSTERECTOMY     • ROTATOR CUFF REPAIR Left      Family History   Problem Relation Age of Onset   • Cancer Father    • Diabetes Son    • Cancer Paternal Aunt    • Heart disease Paternal Aunt    • Cancer Paternal Uncle      Social History     Tobacco Use   • Smoking status: Never   • Smokeless tobacco: Never   Vaping Use   • Vaping Use: Never used   Substance Use Topics   • Alcohol use: No   • Drug use: Never     (Not in a hospital admission)    Allergies:  Baclofen, Codeine, Ibuprofen, Methocarbamol, Naproxen, Tizanidine hcl, and Tolmetin    Review of systems    Constitutional: Negative.    Respiratory and cardiovascular: As detailed in HPI section.  Gastrointestinal: Negative for constipation, nausea and vomiting negative for abdominal distention, abdominal pain and diarrhea.   Genitourinary: Negative for difficulty urinating and flank pain.   Musculoskeletal: Negative for arthralgias, joint swelling and myalgias.   Skin: Negative for color change, rash and wound.   Neurological: Negative for dizziness, syncope, weakness and headaches.   Hematological: Negative for adenopathy.   Psychiatric/Behavioral:  Negative for confusion.   All other systems reviewed and are negative.    Physical Exam  VITALS REVIEWED    General:      well developed, in no acute distress.    Head:      normocephalic and atraumatic.    Eyes:      PERRL/EOM intact, conjunctiva and sclera clear with out nystagmus.    Neck:      no masses, thyromegaly,  trachea central with normal respiratory effort and PMI displaced laterally  Lungs:      Clear to auscultation bilaterally  Heart:       Regular rate and rhythm  Msk:      no deformity or scoliosis noted of thoracic or lumbar spine.    Pulses:      pulses normal in all 4 extremities.    Extremities:       No lower extremity edema  Neurologic:      no focal deficits.   alert oriented x3  Skin:      intact without lesions or rashes.    Psych:      alert and cooperative; normal mood and affect; normal attention span and concentration.      Result Review :               Pertinent cardiac workup    1. EKG 1/20/2023 AV dual paced complexes  2. Echocardiogram 1/5/2023 ejection fraction 35 to 40%, moderate mitral regurgitation.  3. Heart cath 4/2/2021 normal coronaries.    Procedures        Assessment and Plan      Catalina Moreno is an 82-year-old female patient who has tachybradycardia syndrome for which she received a dual-chamber pacemaker in January 2023, paroxysmal atrial fibrillation, nonischemic cardiomyopathy, is here today to establish cardiac care.  I looked at her incision, the staples were removed, there was a slight wound dehiscence which was dressed.  I will see her back in 2 weeks to reassess the wound.  As far as her medications, she remains on sotalol digoxin and Eliquis.  I am concerned about these high-profile medications in this elderly frail lady, I will inquire more history during her follow-up visit and adjust her medications.    Diagnoses and all orders for this visit:    1. Paroxysmal atrial fibrillation (HCC) (Primary)    2. Essential (primary) hypertension    3. Sick sinus  syndrome (HCC)  Overview:  Added automatically from request for surgery [id number]      4. Atrial fibrillation with RVR (HCC)    5. Coronary artery disease involving native coronary artery of native heart without angina pectoris  Overview:  nonobstructive      6. Presence of cardiac pacemaker         No follow-ups on file.  Patient was given instructions and counseling regarding her condition or for health maintenance advice. Please see specific information pulled into the AVS if appropriate.

## 2023-02-19 PROBLEM — Z95.0 PRESENCE OF CARDIAC PACEMAKER: Status: ACTIVE | Noted: 2023-02-19

## 2023-02-27 ENCOUNTER — TELEPHONE (OUTPATIENT)
Dept: CARDIOLOGY | Facility: CLINIC | Age: 83
End: 2023-02-27

## 2023-02-27 NOTE — TELEPHONE ENCOUNTER
Enrolled patient in Lat[employer], called patient, no answer.  Spoke to Jocelyn, she will go to her house next week, I will try to contact her and assist with set up.

## 2023-02-27 NOTE — TELEPHONE ENCOUNTER
Jocelyn with [employer] called. She is with the pt trying to set up the patient's latitude box. Jocelyn called [facility] and they stated the patient cannot link because she has not been enrolled. They instructed Jocelyn to unplug monitor until she is enrolled. Once she is enrolled she should be able to push the heart button to send transmission.     Please call Jocelyn with [employer] [phone] or call the patient.

## 2023-03-17 NOTE — TELEPHONE ENCOUNTER
Called patient, no answer. Spoke to Jocelyn with [employer], she has not been able to contact patient, no answer. Per her son,  patient does not want home care, plan to D/C from home care.

## 2023-03-23 NOTE — TELEPHONE ENCOUNTER
Patient called today, requesting a refill on her hydrocodone and needs something for her neve pain in feet. Patient also is having nausea wants to see if you could call her in something. Please advise.   
Patient notified.  
Sent hydrocodone.  Take Lyrica for her feet .  
Plan: Pt was previously clear with prednisone taper, but then flared after paint exposure. \\nDiscussed oral immunosuppressant vs phototherapy.\\nPt elects phototherapy. Arzola 2.\\nWill start with the following protocol:\\nStart NBUVB:\\n2-3 times per week.\\nInitial dose 220 mJ/cm2.\\nIncrease by 25 mJ/cm2 with each treatment unless patient has pinkness or redness of skin.\\nMaximum dose not to exceed 2000 mJ/cm2.\\nIf missed dose x 1 week, then decrease dose by 25%.\\nIf missed dose x 2 weeks, then decrease dose by 50%.\\nIf missed dose x 3 weeks, the decrease dose by 75% and discuss with staff.\\nIf skin pink or tender, then hold treatment and decrease next dose by 25%.\\nIf skin moderately red and tender or worse, hold treatment and discuss with staff.\\nIf patient starts a photosensitizing medication while on phototherapy, decrease dose by 50% and discuss with staff.\\nRTC 2 months for continued follow up and management.
Detail Level: Zone
Continue Regimen: Continue with Clobetasol to arms and Protopic for the face for current flare.

## 2023-04-12 NOTE — TELEPHONE ENCOUNTER
Please call patient and make apt for device check in [address]. Ask her to bring her monitor with her so I can help her set it up. Thanks

## 2023-05-05 ENCOUNTER — TELEPHONE (OUTPATIENT)
Dept: CARDIOLOGY | Facility: CLINIC | Age: 83
End: 2023-05-05
Payer: MEDICARE

## 2023-05-05 NOTE — TELEPHONE ENCOUNTER
Please call patient and lexiul device check at [address] [place]. She did not keep her apt today. Thanks

## 2023-06-02 ENCOUNTER — TELEPHONE (OUTPATIENT)
Dept: CARDIOLOGY | Facility: CLINIC | Age: 83
End: 2023-06-02

## 2023-06-02 NOTE — TELEPHONE ENCOUNTER
Patient did not keep her apt today, please call her and reschedule device check at [address] location, thanks.

## 2023-06-20 NOTE — TELEPHONE ENCOUNTER
Rx Refill Note  Requested Prescriptions     Pending Prescriptions Disp Refills   • amiodarone (PACERONE) 200 MG tablet 180 tablet 0     Sig: Take 1 tablet by mouth 2 (Two) Times a Day With Meals.   • atorvastatin (LIPITOR) 10 MG tablet 90 tablet 0     Sig: Take 1 tablet by mouth Every Night.   • digoxin (LANOXIN) 125 MCG tablet 90 tablet 0     Sig: Take 1 tablet by mouth Daily.      Last office visit with prescribing clinician: Visit date not found      Next office visit with prescribing clinician: Visit date not found            Shellie Terrell MA  08/11/22, 10:58 EDT  
Admission

## 2023-09-05 ENCOUNTER — HOSPITAL ENCOUNTER (OUTPATIENT)
Facility: HOSPITAL | Age: 83
Setting detail: OBSERVATION
Discharge: HOME | End: 2023-09-07
Attending: EMERGENCY MEDICINE | Admitting: STUDENT IN AN ORGANIZED HEALTH CARE EDUCATION/TRAINING PROGRAM
Payer: MEDICARE

## 2023-09-05 ENCOUNTER — APPOINTMENT (OUTPATIENT)
Dept: CT IMAGING | Facility: HOSPITAL | Age: 83
End: 2023-09-05
Payer: MEDICARE

## 2023-09-05 DIAGNOSIS — E86.0 DEHYDRATION: ICD-10-CM

## 2023-09-05 DIAGNOSIS — K29.00 ACUTE GASTRITIS WITHOUT HEMORRHAGE, UNSPECIFIED GASTRITIS TYPE: ICD-10-CM

## 2023-09-05 DIAGNOSIS — I48.91 ATRIAL FIBRILLATION WITH RAPID VENTRICULAR RESPONSE: Primary | ICD-10-CM

## 2023-09-05 DIAGNOSIS — G89.4 CHRONIC PAIN SYNDROME: ICD-10-CM

## 2023-09-05 PROBLEM — R19.7 DIARRHEA WITH DEHYDRATION: Status: ACTIVE | Noted: 2023-09-05

## 2023-09-05 LAB
ALBUMIN SERPL-MCNC: 4.2 G/DL (ref 3.5–5.2)
ALBUMIN/GLOB SERPL: 1.3 G/DL
ALP SERPL-CCNC: 82 U/L (ref 39–117)
ALT SERPL W P-5'-P-CCNC: 6 U/L (ref 1–33)
ANION GAP SERPL CALCULATED.3IONS-SCNC: 12 MMOL/L (ref 5–15)
AST SERPL-CCNC: 17 U/L (ref 1–32)
BASOPHILS # BLD AUTO: 0 10*3/MM3 (ref 0–0.2)
BASOPHILS NFR BLD AUTO: 0.2 % (ref 0–1.5)
BILIRUB SERPL-MCNC: 1.6 MG/DL (ref 0–1.2)
BILIRUB UR QL STRIP: NEGATIVE
BUN SERPL-MCNC: 15 MG/DL (ref 8–23)
BUN/CREAT SERPL: 22.7 (ref 7–25)
CALCIUM SPEC-SCNC: 9.2 MG/DL (ref 8.6–10.5)
CHLORIDE SERPL-SCNC: 106 MMOL/L (ref 98–107)
CLARITY UR: CLEAR
CO2 SERPL-SCNC: 22 MMOL/L (ref 22–29)
COLOR UR: ABNORMAL
CREAT SERPL-MCNC: 0.66 MG/DL (ref 0.57–1)
CRP SERPL-MCNC: 0.57 MG/DL (ref 0–0.5)
DEPRECATED RDW RBC AUTO: 54.7 FL (ref 37–54)
DIGOXIN SERPL-MCNC: 0.3 NG/ML (ref 0.6–1.2)
EGFRCR SERPLBLD CKD-EPI 2021: 87.7 ML/MIN/1.73
EOSINOPHIL # BLD AUTO: 0.1 10*3/MM3 (ref 0–0.4)
EOSINOPHIL NFR BLD AUTO: 1.3 % (ref 0.3–6.2)
ERYTHROCYTE [DISTWIDTH] IN BLOOD BY AUTOMATED COUNT: 17.4 % (ref 12.3–15.4)
GLOBULIN UR ELPH-MCNC: 3.3 GM/DL
GLUCOSE SERPL-MCNC: 102 MG/DL (ref 65–99)
GLUCOSE UR STRIP-MCNC: NEGATIVE MG/DL
HCT VFR BLD AUTO: 37.5 % (ref 34–46.6)
HGB BLD-MCNC: 12.1 G/DL (ref 12–15.9)
HGB UR QL STRIP.AUTO: NEGATIVE
IRON 24H UR-MRATE: 58 MCG/DL (ref 37–145)
KETONES UR QL STRIP: NEGATIVE
LEUKOCYTE ESTERASE UR QL STRIP.AUTO: NEGATIVE
LIPASE SERPL-CCNC: 11 U/L (ref 13–60)
LYMPHOCYTES # BLD AUTO: 1.8 10*3/MM3 (ref 0.7–3.1)
LYMPHOCYTES NFR BLD AUTO: 25.1 % (ref 19.6–45.3)
MAGNESIUM SERPL-MCNC: 2 MG/DL (ref 1.6–2.4)
MCH RBC QN AUTO: 27.6 PG (ref 26.6–33)
MCHC RBC AUTO-ENTMCNC: 32.2 G/DL (ref 31.5–35.7)
MCV RBC AUTO: 85.4 FL (ref 79–97)
MONOCYTES # BLD AUTO: 0.6 10*3/MM3 (ref 0.1–0.9)
MONOCYTES NFR BLD AUTO: 8.7 % (ref 5–12)
NEUTROPHILS NFR BLD AUTO: 4.7 10*3/MM3 (ref 1.7–7)
NEUTROPHILS NFR BLD AUTO: 64.7 % (ref 42.7–76)
NITRITE UR QL STRIP: NEGATIVE
NRBC BLD AUTO-RTO: 0.1 /100 WBC (ref 0–0.2)
PH UR STRIP.AUTO: 5.5 [PH] (ref 5–8)
PLATELET # BLD AUTO: 258 10*3/MM3 (ref 140–450)
PMV BLD AUTO: 8 FL (ref 6–12)
POTASSIUM SERPL-SCNC: 3.9 MMOL/L (ref 3.5–5.2)
PROT SERPL-MCNC: 7.5 G/DL (ref 6–8.5)
PROT UR QL STRIP: ABNORMAL
RBC # BLD AUTO: 4.39 10*6/MM3 (ref 3.77–5.28)
SODIUM SERPL-SCNC: 140 MMOL/L (ref 136–145)
SP GR UR STRIP: 1.02 (ref 1–1.03)
T4 FREE SERPL-MCNC: 0.91 NG/DL (ref 0.93–1.7)
TROPONIN T SERPL HS-MCNC: 9 NG/L
UROBILINOGEN UR QL STRIP: ABNORMAL
WBC NRBC COR # BLD: 7.3 10*3/MM3 (ref 3.4–10.8)

## 2023-09-05 PROCEDURE — 82746 ASSAY OF FOLIC ACID SERUM: CPT | Performed by: STUDENT IN AN ORGANIZED HEALTH CARE EDUCATION/TRAINING PROGRAM

## 2023-09-05 PROCEDURE — 96375 TX/PRO/DX INJ NEW DRUG ADDON: CPT

## 2023-09-05 PROCEDURE — 86258 DGP ANTIBODY EACH IG CLASS: CPT | Performed by: STUDENT IN AN ORGANIZED HEALTH CARE EDUCATION/TRAINING PROGRAM

## 2023-09-05 PROCEDURE — 86364 TISS TRNSGLTMNASE EA IG CLAS: CPT | Performed by: STUDENT IN AN ORGANIZED HEALTH CARE EDUCATION/TRAINING PROGRAM

## 2023-09-05 PROCEDURE — 96361 HYDRATE IV INFUSION ADD-ON: CPT

## 2023-09-05 PROCEDURE — 25010000002 DIGOXIN PER 500 MCG: Performed by: EMERGENCY MEDICINE

## 2023-09-05 PROCEDURE — 99284 EMERGENCY DEPT VISIT MOD MDM: CPT

## 2023-09-05 PROCEDURE — 96376 TX/PRO/DX INJ SAME DRUG ADON: CPT

## 2023-09-05 PROCEDURE — 86140 C-REACTIVE PROTEIN: CPT | Performed by: STUDENT IN AN ORGANIZED HEALTH CARE EDUCATION/TRAINING PROGRAM

## 2023-09-05 PROCEDURE — G0378 HOSPITAL OBSERVATION PER HR: HCPCS

## 2023-09-05 PROCEDURE — 83540 ASSAY OF IRON: CPT | Performed by: STUDENT IN AN ORGANIZED HEALTH CARE EDUCATION/TRAINING PROGRAM

## 2023-09-05 PROCEDURE — 25010000002 MORPHINE PER 10 MG: Performed by: EMERGENCY MEDICINE

## 2023-09-05 PROCEDURE — 36415 COLL VENOUS BLD VENIPUNCTURE: CPT | Performed by: STUDENT IN AN ORGANIZED HEALTH CARE EDUCATION/TRAINING PROGRAM

## 2023-09-05 PROCEDURE — 84484 ASSAY OF TROPONIN QUANT: CPT | Performed by: EMERGENCY MEDICINE

## 2023-09-05 PROCEDURE — 80053 COMPREHEN METABOLIC PANEL: CPT | Performed by: EMERGENCY MEDICINE

## 2023-09-05 PROCEDURE — 81003 URINALYSIS AUTO W/O SCOPE: CPT | Performed by: EMERGENCY MEDICINE

## 2023-09-05 PROCEDURE — 85025 COMPLETE CBC W/AUTO DIFF WBC: CPT | Performed by: EMERGENCY MEDICINE

## 2023-09-05 PROCEDURE — 74176 CT ABD & PELVIS W/O CONTRAST: CPT

## 2023-09-05 PROCEDURE — P9612 CATHETERIZE FOR URINE SPEC: HCPCS

## 2023-09-05 PROCEDURE — 80162 ASSAY OF DIGOXIN TOTAL: CPT | Performed by: EMERGENCY MEDICINE

## 2023-09-05 PROCEDURE — 25010000002 ONDANSETRON PER 1 MG: Performed by: EMERGENCY MEDICINE

## 2023-09-05 PROCEDURE — 84439 ASSAY OF FREE THYROXINE: CPT | Performed by: STUDENT IN AN ORGANIZED HEALTH CARE EDUCATION/TRAINING PROGRAM

## 2023-09-05 PROCEDURE — 83735 ASSAY OF MAGNESIUM: CPT | Performed by: STUDENT IN AN ORGANIZED HEALTH CARE EDUCATION/TRAINING PROGRAM

## 2023-09-05 PROCEDURE — 82607 VITAMIN B-12: CPT | Performed by: STUDENT IN AN ORGANIZED HEALTH CARE EDUCATION/TRAINING PROGRAM

## 2023-09-05 PROCEDURE — 83690 ASSAY OF LIPASE: CPT | Performed by: EMERGENCY MEDICINE

## 2023-09-05 PROCEDURE — 93005 ELECTROCARDIOGRAM TRACING: CPT | Performed by: EMERGENCY MEDICINE

## 2023-09-05 PROCEDURE — 82784 ASSAY IGA/IGD/IGG/IGM EACH: CPT | Performed by: STUDENT IN AN ORGANIZED HEALTH CARE EDUCATION/TRAINING PROGRAM

## 2023-09-05 RX ORDER — DILTIAZEM HYDROCHLORIDE 5 MG/ML
20 INJECTION INTRAVENOUS ONCE
Status: COMPLETED | OUTPATIENT
Start: 2023-09-05 | End: 2023-09-05

## 2023-09-05 RX ORDER — POLYETHYLENE GLYCOL 3350 17 G/17G
17 POWDER, FOR SOLUTION ORAL DAILY PRN
Status: DISCONTINUED | OUTPATIENT
Start: 2023-09-05 | End: 2023-09-07 | Stop reason: HOSPADM

## 2023-09-05 RX ORDER — TEMAZEPAM 15 MG/1
15 CAPSULE ORAL NIGHTLY PRN
Status: DISCONTINUED | OUTPATIENT
Start: 2023-09-05 | End: 2023-09-07 | Stop reason: HOSPADM

## 2023-09-05 RX ORDER — DIGOXIN 125 MCG
125 TABLET ORAL
Status: DISCONTINUED | OUTPATIENT
Start: 2023-09-05 | End: 2023-09-07 | Stop reason: HOSPADM

## 2023-09-05 RX ORDER — SODIUM CHLORIDE 9 MG/ML
40 INJECTION, SOLUTION INTRAVENOUS AS NEEDED
Status: DISCONTINUED | OUTPATIENT
Start: 2023-09-05 | End: 2023-09-07 | Stop reason: HOSPADM

## 2023-09-05 RX ORDER — DIGOXIN 0.25 MG/ML
250 INJECTION INTRAMUSCULAR; INTRAVENOUS ONCE
Status: COMPLETED | OUTPATIENT
Start: 2023-09-05 | End: 2023-09-05

## 2023-09-05 RX ORDER — AMOXICILLIN 250 MG
2 CAPSULE ORAL 2 TIMES DAILY
Status: DISCONTINUED | OUTPATIENT
Start: 2023-09-05 | End: 2023-09-07 | Stop reason: HOSPADM

## 2023-09-05 RX ORDER — HYDROCODONE BITARTRATE AND ACETAMINOPHEN 7.5; 325 MG/1; MG/1
1 TABLET ORAL 4 TIMES DAILY PRN
Status: DISCONTINUED | OUTPATIENT
Start: 2023-09-05 | End: 2023-09-07 | Stop reason: HOSPADM

## 2023-09-05 RX ORDER — MORPHINE SULFATE 2 MG/ML
2 INJECTION, SOLUTION INTRAMUSCULAR; INTRAVENOUS ONCE
Status: COMPLETED | OUTPATIENT
Start: 2023-09-05 | End: 2023-09-05

## 2023-09-05 RX ORDER — BISACODYL 10 MG
10 SUPPOSITORY, RECTAL RECTAL DAILY PRN
Status: DISCONTINUED | OUTPATIENT
Start: 2023-09-05 | End: 2023-09-07 | Stop reason: HOSPADM

## 2023-09-05 RX ORDER — SODIUM CHLORIDE 0.9 % (FLUSH) 0.9 %
10 SYRINGE (ML) INJECTION EVERY 12 HOURS SCHEDULED
Status: DISCONTINUED | OUTPATIENT
Start: 2023-09-05 | End: 2023-09-07 | Stop reason: HOSPADM

## 2023-09-05 RX ORDER — GABAPENTIN 600 MG/1
600 TABLET ORAL 4 TIMES DAILY
Status: DISCONTINUED | OUTPATIENT
Start: 2023-09-05 | End: 2023-09-07 | Stop reason: HOSPADM

## 2023-09-05 RX ORDER — ONDANSETRON 2 MG/ML
4 INJECTION INTRAMUSCULAR; INTRAVENOUS ONCE
Status: COMPLETED | OUTPATIENT
Start: 2023-09-05 | End: 2023-09-05

## 2023-09-05 RX ORDER — BISACODYL 5 MG/1
5 TABLET, DELAYED RELEASE ORAL DAILY PRN
Status: DISCONTINUED | OUTPATIENT
Start: 2023-09-05 | End: 2023-09-07 | Stop reason: HOSPADM

## 2023-09-05 RX ORDER — LEVOTHYROXINE SODIUM 88 UG/1
88 TABLET ORAL DAILY
Status: DISCONTINUED | OUTPATIENT
Start: 2023-09-05 | End: 2023-09-07 | Stop reason: HOSPADM

## 2023-09-05 RX ORDER — PANTOPRAZOLE SODIUM 40 MG/10ML
40 INJECTION, POWDER, LYOPHILIZED, FOR SOLUTION INTRAVENOUS ONCE
Status: COMPLETED | OUTPATIENT
Start: 2023-09-05 | End: 2023-09-05

## 2023-09-05 RX ORDER — ONDANSETRON 4 MG/1
4 TABLET, FILM COATED ORAL EVERY 6 HOURS PRN
Status: DISCONTINUED | OUTPATIENT
Start: 2023-09-05 | End: 2023-09-07 | Stop reason: HOSPADM

## 2023-09-05 RX ORDER — SODIUM CHLORIDE 0.9 % (FLUSH) 0.9 %
10 SYRINGE (ML) INJECTION AS NEEDED
Status: DISCONTINUED | OUTPATIENT
Start: 2023-09-05 | End: 2023-09-07 | Stop reason: HOSPADM

## 2023-09-05 RX ORDER — ROPINIROLE 0.25 MG/1
0.25 TABLET, FILM COATED ORAL NIGHTLY
Status: DISCONTINUED | OUTPATIENT
Start: 2023-09-05 | End: 2023-09-07 | Stop reason: HOSPADM

## 2023-09-05 RX ORDER — ATORVASTATIN CALCIUM 10 MG/1
10 TABLET, FILM COATED ORAL NIGHTLY
Status: DISCONTINUED | OUTPATIENT
Start: 2023-09-05 | End: 2023-09-07 | Stop reason: HOSPADM

## 2023-09-05 RX ORDER — ONDANSETRON 2 MG/ML
4 INJECTION INTRAMUSCULAR; INTRAVENOUS EVERY 6 HOURS PRN
Status: DISCONTINUED | OUTPATIENT
Start: 2023-09-05 | End: 2023-09-07 | Stop reason: HOSPADM

## 2023-09-05 RX ORDER — LIDOCAINE 50 MG/G
2 PATCH TOPICAL
Status: DISCONTINUED | OUTPATIENT
Start: 2023-09-05 | End: 2023-09-07 | Stop reason: HOSPADM

## 2023-09-05 RX ORDER — SODIUM CHLORIDE, SODIUM LACTATE, POTASSIUM CHLORIDE, CALCIUM CHLORIDE 600; 310; 30; 20 MG/100ML; MG/100ML; MG/100ML; MG/100ML
100 INJECTION, SOLUTION INTRAVENOUS CONTINUOUS
Status: DISCONTINUED | OUTPATIENT
Start: 2023-09-05 | End: 2023-09-07 | Stop reason: HOSPADM

## 2023-09-05 RX ORDER — PANTOPRAZOLE SODIUM 40 MG/1
40 TABLET, DELAYED RELEASE ORAL
Status: DISCONTINUED | OUTPATIENT
Start: 2023-09-06 | End: 2023-09-06

## 2023-09-05 RX ORDER — ACETAMINOPHEN 325 MG/1
650 TABLET ORAL EVERY 4 HOURS PRN
Status: DISCONTINUED | OUTPATIENT
Start: 2023-09-05 | End: 2023-09-07 | Stop reason: HOSPADM

## 2023-09-05 RX ADMIN — ONDANSETRON 4 MG: 2 INJECTION INTRAMUSCULAR; INTRAVENOUS at 15:39

## 2023-09-05 RX ADMIN — LEVOTHYROXINE SODIUM 88 MCG: 0.09 TABLET ORAL at 18:37

## 2023-09-05 RX ADMIN — ATORVASTATIN CALCIUM 10 MG: 10 TABLET, FILM COATED ORAL at 20:24

## 2023-09-05 RX ADMIN — MORPHINE SULFATE 2 MG: 2 INJECTION, SOLUTION INTRAMUSCULAR; INTRAVENOUS at 15:39

## 2023-09-05 RX ADMIN — LIDOCAINE 2 PATCH: 50 PATCH TOPICAL at 18:38

## 2023-09-05 RX ADMIN — PANTOPRAZOLE SODIUM 40 MG: 40 INJECTION, POWDER, LYOPHILIZED, FOR SOLUTION INTRAVENOUS at 15:39

## 2023-09-05 RX ADMIN — GABAPENTIN 600 MG: 600 TABLET, FILM COATED ORAL at 20:24

## 2023-09-05 RX ADMIN — SODIUM CHLORIDE 500 ML: 9 INJECTION, SOLUTION INTRAVENOUS at 17:59

## 2023-09-05 RX ADMIN — DIGOXIN 250 MCG: 0.25 INJECTION INTRAMUSCULAR; INTRAVENOUS at 17:20

## 2023-09-05 RX ADMIN — ROPINIROLE HYDROCHLORIDE 0.25 MG: 0.25 TABLET, FILM COATED ORAL at 20:24

## 2023-09-05 RX ADMIN — DILTIAZEM HYDROCHLORIDE 20 MG: 5 INJECTION INTRAVENOUS at 17:20

## 2023-09-05 RX ADMIN — DIGOXIN 125 MCG: 125 TABLET ORAL at 18:37

## 2023-09-05 RX ADMIN — HYDROCODONE BITARTRATE AND ACETAMINOPHEN 1 TABLET: 7.5; 325 TABLET ORAL at 23:50

## 2023-09-05 RX ADMIN — Medication 10 ML: at 20:24

## 2023-09-05 RX ADMIN — SERTRALINE 50 MG: 50 TABLET, FILM COATED ORAL at 20:24

## 2023-09-05 RX ADMIN — SODIUM CHLORIDE, POTASSIUM CHLORIDE, SODIUM LACTATE AND CALCIUM CHLORIDE 100 ML/HR: 600; 310; 30; 20 INJECTION, SOLUTION INTRAVENOUS at 18:38

## 2023-09-05 RX ADMIN — SODIUM CHLORIDE, POTASSIUM CHLORIDE, SODIUM LACTATE AND CALCIUM CHLORIDE 500 ML: 600; 310; 30; 20 INJECTION, SOLUTION INTRAVENOUS at 15:38

## 2023-09-05 NOTE — CASE MANAGEMENT/SOCIAL WORK
Discharge Planning Assessment  [facility]     Patient Name: Catalina Moreno  MRN: [id number]  Today's Date: 9/5/2023    Admit Date: 9/5/2023    Plan: Return Home   Discharge Needs Assessment       Row Name 09/05/23 1848       Living Environment    People in Home alone    Current Living Arrangements home    Potentially Unsafe Housing Conditions none    Primary Care Provided by self    Provides Primary Care For no one    Family Caregiver if Needed child(evangelista), adult    Family Caregiver Names Jordan    Quality of Family Relationships helpful;involved;supportive    Able to Return to Prior Arrangements yes       Resource/Environmental Concerns    Resource/Environmental Concerns none    Transportation Concerns none  son       Transition Planning    Patient/Family Anticipates Transition to home;home with family    Patient/Family Anticipated Services at Transition none    Transportation Anticipated family or friend will provide       Discharge Needs Assessment    Readmission Within the Last 30 Days no previous admission in last 30 days    Equipment Currently Used at Home walker, rolling;cane, quad tip    Concerns to be Addressed no discharge needs identified    Equipment Needed After Discharge none                   Discharge Plan       Row Name 09/05/23 7038       Plan    Plan Return Home    Plan Comments Spoke with patient at bedside, lives alone.Son Jordan availalbe to help as needed. Confirmed PCP and Pharmacy. Denies transportation or medication coverage issuesl                  Continued Care and Services - Admitted Since 9/5/2023    Coordination has not been started for this encounter.       Expected Discharge Date and Time       Expected Discharge Date Expected Discharge Time    Sep 6, 2023            Demographic Summary       Row Name 09/05/23 1847       General Information    Admission Type observation    Arrived From home    Required Notices Provided Observation Status Notice    Referral Source patient    Reason for  Consult discharge planning    Preferred Language English                   Functional Status       Row Name 09/05/23 1848       Functional Status    Usual Activity Tolerance good    Current Activity Tolerance good       Functional Status, IADL    Medications independent    Meal Preparation independent    Housekeeping independent    Laundry independent    Shopping independent       Mental Status    General Appearance WDL WDL                  Patient Forms       Row Name 09/05/23 1848       Patient Forms    Patient Observation Letter Delivered  9/5/23 Registration             Met with patient in room wearing PPE: mask, face shield/goggles, gloves, gown.      Maintained distance greater than six feet and spent less than 15 minutes in the room.     Nan Weir RN

## 2023-09-05 NOTE — ED PROVIDER NOTES
Subjective   History of Present Illness  83-year-old female sent in for evaluation of rapid heart rate and dehydration.  The patient reported has had epigastric pain and has been noted to have poor appetite with poor fluid and food intake for several days.  The patient states that that she has felt hot and intermittently achy for the last 3 days.  She states that she has a lot of nausea she has been fairly constant for several days.  She reports no melena hematemesis hematochezia she denies dysuria or hematuria.  She denies chest pain.  She states she has had some shortness of breath and weakness and has had palpitations in the last 24 hours    Review of Systems   Constitutional:  Positive for chills, fatigue and fever.   Respiratory:  Positive for chest tightness. Negative for shortness of breath.    Cardiovascular:  Positive for palpitations. Negative for leg swelling.   Gastrointestinal:  Positive for abdominal pain, diarrhea and nausea. Negative for anal bleeding, blood in stool, constipation and vomiting.   Musculoskeletal:  Positive for arthralgias and myalgias. Negative for neck pain and neck stiffness.   Hematological:  Does not bruise/bleed easily.   All other systems reviewed and are negative.    Past Medical History:   Diagnosis Date    Acute kidney injury 07/22/2022    Anxiety     Arthritis     Atrial fibrillation     paroxysmal    Broken shoulder     left-- due to fall 11-7-19 was at [facility]    Chronic systolic CHF (congestive heart failure) 01/05/2023    EF 36-40%    Coronary artery disease involving native coronary artery of native heart without angina pectoris 04/02/2021    nonobstructive    DDD (degenerative disc disease), lumbar     Depression     Edema     9/2020 foot    GERD (gastroesophageal reflux disease)     H/O fall     Heart failure with mid-range ejection fraction 03/05/2022    EF 45% per 2D echo    Hypertension     Hypothyroidism     Insomnia     Low back pain     Moderate mitral  regurgitation 1/5/2023    Neuropathy     Pain in both feet     PONV (postoperative nausea and vomiting)     Pulmonary hypertension     Radiculopathy     Restless legs     Spondylolisthesis     Stage 3a chronic kidney disease     Urinary incontinence        Allergies   Allergen Reactions    Baclofen Rash    Codeine Nausea Only    Ibuprofen Unknown (See Comments)     Patient doesn't know----Motin    Methocarbamol Unknown (See Comments)     Patient doesn't know    Naproxen Unknown (See Comments)     Pt. Doesn't know     Tizanidine Hcl Other (See Comments)     Syncope     Tolmetin Dizziness     Pt. Doesn't know-- same as Tolectin       Past Surgical History:   Procedure Laterality Date    BACK SURGERY  07/19/2018    PDC &  PSF L3-L5 insitu    CARDIAC CATHETERIZATION N/A 4/2/2021    Procedure: Cardiac Catheterization/Vascular Study;  Surgeon: Barrett Evans MD;  Location: [facility] CATH INVASIVE LOCATION;  Service: Cardiovascular;  Laterality: N/A;    CARDIAC ELECTROPHYSIOLOGY PROCEDURE N/A 1/17/2023    Procedure: Pacemaker DC new;  Surgeon: Baljeet Valero MD;  Location:  [facility] CATH INVASIVE LOCATION;  Service: Cardiovascular;  Laterality: N/A;    CHOLECYSTECTOMY      COLONOSCOPY      HYSTERECTOMY      ROTATOR CUFF REPAIR Left        Family History   Problem Relation Age of Onset    Cancer Father     Diabetes Son     Cancer Paternal Aunt     Heart disease Paternal Aunt     Cancer Paternal Uncle        Social History     Socioeconomic History    Marital status:    Tobacco Use    Smoking status: Never    Smokeless tobacco: Never   Vaping Use    Vaping Use: Never used   Substance and Sexual Activity    Alcohol use: No    Drug use: Never    Sexual activity: Defer     No reports of recent medication change.  No reports of recent unusual food water travel or activity      Objective   Physical Exam  Alert [place] Coma Scale 15   HEENT: Pupils equal and reactive to light. Conjunctivae are not injected.  Normal tympanic membranes. Oropharynx and nares are normal.   Neck: Supple. Midline trachea. No JVD. No goiter.   Chest: Clear and equal breath sounds bilaterally, regular rate and rhythm without murmur or rub.   Abdomen: Positive bowel sounds, pregastric tenderness is noted.  Negative Hendrix sign.  Evidence of hysterectomy and cholecystectomy the abdomen is mildly obese but nondistended. No rebound or peritoneal signs. No CVA tenderness.   Extremities no clubbing. cyanosis or edema. Motor sensory exam is normal. The full range of motion is intact   Skin: Warm and dry, no rashes or petechia.   Lymphatic: No regional lymphadenopathy. No calf pain, swelling or Homans sign    Procedures           ED Course      Labs Reviewed   DIGOXIN LEVEL - Abnormal; Notable for the following components:       Result Value    Digoxin 0.30 (*)     All other components within normal limits   COMPREHENSIVE METABOLIC PANEL - Abnormal; Notable for the following components:    Glucose 102 (*)     Total Bilirubin 1.6 (*)     All other components within normal limits    Narrative:     GFR Normal >60  Chronic Kidney Disease <60  Kidney Failure <15    The GFR formula is only valid for adults with stable renal function between ages 18 and 70.   LIPASE - Abnormal; Notable for the following components:    Lipase 11 (*)     All other components within normal limits   URINALYSIS W/ CULTURE IF INDICATED - Abnormal; Notable for the following components:    Color, UA Dark Yellow (*)     Protein, UA Trace (*)     All other components within normal limits    Narrative:     In absence of clinical symptoms, the presence of pyuria, bacteria, and/or nitrites on the urinalysis result does not correlate with infection.  Urine microscopic not indicated.   CBC WITH AUTO DIFFERENTIAL - Abnormal; Notable for the following components:    RDW 17.4 (*)     RDW-SD 54.7 (*)     All other components within normal limits   SINGLE HSTROPONIN T - Normal    Narrative:      High Sensitive Troponin T Reference Range:  <10.0 ng/L- Negative Female for AMI  <15.0 ng/L- Negative Male for AMI  >=10 - Abnormal Female indicating possible myocardial injury.  >=15 - Abnormal Male indicating possible myocardial injury.   Clinicians would have to utilize clinical acumen, EKG, Troponin, and serial changes to determine if it is an Acute Myocardial Infarction or myocardial injury due to an underlying chronic condition.        CBC AND DIFFERENTIAL    Narrative:     The following orders were created for panel order CBC & Differential.  Procedure                               Abnormality         Status                     ---------                               -----------         ------                     CBC Auto Differential[[id number]]        Abnormal            Final result                 Please view results for these tests on the individual orders.     Medications   lactated ringers bolus 500 mL (500 mL Intravenous New Bag 9/5/23 1538)   ondansetron (ZOFRAN) injection 4 mg (4 mg Intravenous Given 9/5/23 1539)   morphine injection 2 mg (2 mg Intravenous Given 9/5/23 1539)   pantoprazole (PROTONIX) injection 40 mg (40 mg Intravenous Given 9/5/23 1539)   dilTIAZem (CARDIZEM) injection 20 mg (20 mg Intravenous Given 9/5/23 1720)   digoxin (LANOXIN) injection 250 mcg (250 mcg Intravenous Given 9/5/23 1720)     CT Abdomen Pelvis Without Contrast    Result Date: 9/5/2023  Impression: No acute CT abnormalities in the abdomen or pelvis Electronically Signed: Jake Song MD  9/5/2023 4:37 PM EDT  Workstation ID: [id number]                                        Medical Decision Making  Patient appeared improved with ER therapy.  The patient was hydrated initially this did not significantly decrease the heart rate and the patient was given Cardizem and when determined not to be dig toxic was given digoxin IV.  This controlled the heart rate.  The patient was also given Protonix the patient will be  admitted.  The case was discussed with Dr. Alvarez.     Amount and/or Complexity of Data Reviewed  External Data Reviewed: notes.  Labs: ordered. Decision-making details documented in ED Course.  Radiology: ordered and independent interpretation performed.  ECG/medicine tests: ordered and independent interpretation performed.     Details: Atrial fibrillation, history of atrial fibrillation.  Nonspecific T wave changes probably rate related to some extent  Discussion of management or test interpretation with external provider(s): Discussed with hospitalist    Risk  OTC drugs.  Prescription drug management.  Decision regarding hospitalization.        Final diagnoses:   None       ED Disposition  ED Disposition       None            No follow-up provider specified.       Medication List      No changes were made to your prescriptions during this visit.            Rizwan Mares MD  09/06/23 3334

## 2023-09-06 ENCOUNTER — ON CAMPUS - OUTPATIENT (AMBULATORY)
Dept: URBAN - METROPOLITAN AREA HOSPITAL 85 | Facility: HOSPITAL | Age: 83
End: 2023-09-06
Payer: COMMERCIAL

## 2023-09-06 DIAGNOSIS — R10.10 UPPER ABDOMINAL PAIN, UNSPECIFIED: ICD-10-CM

## 2023-09-06 DIAGNOSIS — D64.9 ANEMIA, UNSPECIFIED: ICD-10-CM

## 2023-09-06 DIAGNOSIS — R63.0 ANOREXIA: ICD-10-CM

## 2023-09-06 DIAGNOSIS — R94.5 ABNORMAL RESULTS OF LIVER FUNCTION STUDIES: ICD-10-CM

## 2023-09-06 DIAGNOSIS — R10.13 EPIGASTRIC PAIN: ICD-10-CM

## 2023-09-06 DIAGNOSIS — R19.7 DIARRHEA, UNSPECIFIED: ICD-10-CM

## 2023-09-06 DIAGNOSIS — Z90.49 ACQUIRED ABSENCE OF OTHER SPECIFIED PARTS OF DIGESTIVE TRACT: ICD-10-CM

## 2023-09-06 DIAGNOSIS — R11.0 NAUSEA: ICD-10-CM

## 2023-09-06 LAB
ALBUMIN SERPL-MCNC: 3.8 G/DL (ref 3.5–5.2)
ALBUMIN/GLOB SERPL: 1.4 G/DL
ALP SERPL-CCNC: 95 U/L (ref 39–117)
ALT SERPL W P-5'-P-CCNC: 17 U/L (ref 1–33)
ANION GAP SERPL CALCULATED.3IONS-SCNC: 9 MMOL/L (ref 5–15)
AST SERPL-CCNC: 54 U/L (ref 1–32)
BASOPHILS # BLD AUTO: 0 10*3/MM3 (ref 0–0.2)
BASOPHILS # BLD AUTO: 0.1 10*3/MM3 (ref 0–0.2)
BASOPHILS NFR BLD AUTO: 0.9 % (ref 0–1.5)
BASOPHILS NFR BLD AUTO: 0.9 % (ref 0–1.5)
BILIRUB CONJ SERPL-MCNC: 0.3 MG/DL (ref 0–0.3)
BILIRUB INDIRECT SERPL-MCNC: 1.1 MG/DL
BILIRUB SERPL-MCNC: 1.4 MG/DL (ref 0–1.2)
BILIRUB SERPL-MCNC: 1.6 MG/DL (ref 0–1.2)
BUN SERPL-MCNC: 18 MG/DL (ref 8–23)
BUN/CREAT SERPL: 21.7 (ref 7–25)
CALCIUM SPEC-SCNC: 8.7 MG/DL (ref 8.6–10.5)
CHLORIDE SERPL-SCNC: 106 MMOL/L (ref 98–107)
CO2 SERPL-SCNC: 24 MMOL/L (ref 22–29)
CREAT SERPL-MCNC: 0.83 MG/DL (ref 0.57–1)
DEPRECATED RDW RBC AUTO: 53.8 FL (ref 37–54)
DEPRECATED RDW RBC AUTO: 55.1 FL (ref 37–54)
EGFRCR SERPLBLD CKD-EPI 2021: 70.5 ML/MIN/1.73
EOSINOPHIL # BLD AUTO: 0.1 10*3/MM3 (ref 0–0.4)
EOSINOPHIL # BLD AUTO: 0.3 10*3/MM3 (ref 0–0.4)
EOSINOPHIL NFR BLD AUTO: 0.9 % (ref 0.3–6.2)
EOSINOPHIL NFR BLD AUTO: 5.6 % (ref 0.3–6.2)
ERYTHROCYTE [DISTWIDTH] IN BLOOD BY AUTOMATED COUNT: 16.9 % (ref 12.3–15.4)
ERYTHROCYTE [DISTWIDTH] IN BLOOD BY AUTOMATED COUNT: 17.3 % (ref 12.3–15.4)
FERRITIN SERPL-MCNC: 29.26 NG/ML (ref 13–150)
FOLATE SERPL-MCNC: 9.46 NG/ML (ref 4.78–24.2)
GLOBULIN UR ELPH-MCNC: 2.7 GM/DL
GLUCOSE SERPL-MCNC: 101 MG/DL (ref 65–99)
HAV IGM SERPL QL IA: NORMAL
HBV CORE IGM SERPL QL IA: NORMAL
HBV SURFACE AG SERPL QL IA: NORMAL
HCT VFR BLD AUTO: 31.3 % (ref 34–46.6)
HCT VFR BLD AUTO: 32.9 % (ref 34–46.6)
HCV AB SER DONR QL: NORMAL
HGB BLD-MCNC: 10.5 G/DL (ref 12–15.9)
HGB BLD-MCNC: 9.9 G/DL (ref 12–15.9)
LYMPHOCYTES # BLD AUTO: 1.4 10*3/MM3 (ref 0.7–3.1)
LYMPHOCYTES # BLD AUTO: 2 10*3/MM3 (ref 0.7–3.1)
LYMPHOCYTES NFR BLD AUTO: 18.7 % (ref 19.6–45.3)
LYMPHOCYTES NFR BLD AUTO: 35.4 % (ref 19.6–45.3)
MCH RBC QN AUTO: 27.2 PG (ref 26.6–33)
MCH RBC QN AUTO: 27.3 PG (ref 26.6–33)
MCHC RBC AUTO-ENTMCNC: 31.8 G/DL (ref 31.5–35.7)
MCHC RBC AUTO-ENTMCNC: 31.9 G/DL (ref 31.5–35.7)
MCV RBC AUTO: 85.6 FL (ref 79–97)
MCV RBC AUTO: 85.7 FL (ref 79–97)
MONOCYTES # BLD AUTO: 0.6 10*3/MM3 (ref 0.1–0.9)
MONOCYTES # BLD AUTO: 0.7 10*3/MM3 (ref 0.1–0.9)
MONOCYTES NFR BLD AUTO: 9.4 % (ref 5–12)
MONOCYTES NFR BLD AUTO: 9.7 % (ref 5–12)
NEUTROPHILS NFR BLD AUTO: 2.8 10*3/MM3 (ref 1.7–7)
NEUTROPHILS NFR BLD AUTO: 48.4 % (ref 42.7–76)
NEUTROPHILS NFR BLD AUTO: 5.1 10*3/MM3 (ref 1.7–7)
NEUTROPHILS NFR BLD AUTO: 70.1 % (ref 42.7–76)
NRBC BLD AUTO-RTO: 0.1 /100 WBC (ref 0–0.2)
NRBC BLD AUTO-RTO: 0.3 /100 WBC (ref 0–0.2)
PLATELET # BLD AUTO: 198 10*3/MM3 (ref 140–450)
PLATELET # BLD AUTO: 212 10*3/MM3 (ref 140–450)
PMV BLD AUTO: 8.4 FL (ref 6–12)
PMV BLD AUTO: 8.6 FL (ref 6–12)
POTASSIUM SERPL-SCNC: 4.2 MMOL/L (ref 3.5–5.2)
PROT SERPL-MCNC: 6.5 G/DL (ref 6–8.5)
QT INTERVAL: 308 MS
QTC INTERVAL: 500 MS
RBC # BLD AUTO: 3.65 10*6/MM3 (ref 3.77–5.28)
RBC # BLD AUTO: 3.84 10*6/MM3 (ref 3.77–5.28)
SODIUM SERPL-SCNC: 139 MMOL/L (ref 136–145)
TSH SERPL DL<=0.05 MIU/L-ACNC: 5.46 UIU/ML (ref 0.27–4.2)
VIT B12 BLD-MCNC: 431 PG/ML (ref 211–946)
WBC NRBC COR # BLD: 5.7 10*3/MM3 (ref 3.4–10.8)
WBC NRBC COR # BLD: 7.3 10*3/MM3 (ref 3.4–10.8)

## 2023-09-06 PROCEDURE — G0378 HOSPITAL OBSERVATION PER HR: HCPCS

## 2023-09-06 PROCEDURE — 80053 COMPREHEN METABOLIC PANEL: CPT | Performed by: STUDENT IN AN ORGANIZED HEALTH CARE EDUCATION/TRAINING PROGRAM

## 2023-09-06 PROCEDURE — 96361 HYDRATE IV INFUSION ADD-ON: CPT

## 2023-09-06 PROCEDURE — 96376 TX/PRO/DX INJ SAME DRUG ADON: CPT

## 2023-09-06 PROCEDURE — 96366 THER/PROPH/DIAG IV INF ADDON: CPT

## 2023-09-06 PROCEDURE — 25010000002 ONDANSETRON PER 1 MG: Performed by: STUDENT IN AN ORGANIZED HEALTH CARE EDUCATION/TRAINING PROGRAM

## 2023-09-06 PROCEDURE — 25010000002 METOCLOPRAMIDE PER 10 MG: Performed by: NURSE PRACTITIONER

## 2023-09-06 PROCEDURE — 85025 COMPLETE CBC W/AUTO DIFF WBC: CPT | Performed by: STUDENT IN AN ORGANIZED HEALTH CARE EDUCATION/TRAINING PROGRAM

## 2023-09-06 PROCEDURE — 36415 COLL VENOUS BLD VENIPUNCTURE: CPT | Performed by: NURSE PRACTITIONER

## 2023-09-06 PROCEDURE — 82728 ASSAY OF FERRITIN: CPT | Performed by: NURSE PRACTITIONER

## 2023-09-06 PROCEDURE — 82977 ASSAY OF GGT: CPT | Performed by: NURSE PRACTITIONER

## 2023-09-06 PROCEDURE — 82248 BILIRUBIN DIRECT: CPT | Performed by: NURSE PRACTITIONER

## 2023-09-06 PROCEDURE — 82247 BILIRUBIN TOTAL: CPT | Performed by: NURSE PRACTITIONER

## 2023-09-06 PROCEDURE — 86038 ANTINUCLEAR ANTIBODIES: CPT | Performed by: NURSE PRACTITIONER

## 2023-09-06 PROCEDURE — 85025 COMPLETE CBC W/AUTO DIFF WBC: CPT | Performed by: NURSE PRACTITIONER

## 2023-09-06 PROCEDURE — 84443 ASSAY THYROID STIM HORMONE: CPT | Performed by: STUDENT IN AN ORGANIZED HEALTH CARE EDUCATION/TRAINING PROGRAM

## 2023-09-06 PROCEDURE — 96375 TX/PRO/DX INJ NEW DRUG ADDON: CPT

## 2023-09-06 PROCEDURE — 99223 1ST HOSP IP/OBS HIGH 75: CPT | Performed by: INTERNAL MEDICINE

## 2023-09-06 PROCEDURE — 82103 ALPHA-1-ANTITRYPSIN TOTAL: CPT | Performed by: NURSE PRACTITIONER

## 2023-09-06 PROCEDURE — 82390 ASSAY OF CERULOPLASMIN: CPT | Performed by: NURSE PRACTITIONER

## 2023-09-06 PROCEDURE — 80074 ACUTE HEPATITIS PANEL: CPT | Performed by: NURSE PRACTITIONER

## 2023-09-06 PROCEDURE — 96365 THER/PROPH/DIAG IV INF INIT: CPT

## 2023-09-06 PROCEDURE — 86015 ACTIN ANTIBODY EACH: CPT | Performed by: NURSE PRACTITIONER

## 2023-09-06 PROCEDURE — 86381 MITOCHONDRIAL ANTIBODY EACH: CPT | Performed by: NURSE PRACTITIONER

## 2023-09-06 PROCEDURE — 99222 1ST HOSP IP/OBS MODERATE 55: CPT | Mod: FS | Performed by: NURSE PRACTITIONER

## 2023-09-06 RX ORDER — METOCLOPRAMIDE HYDROCHLORIDE 5 MG/ML
10 INJECTION INTRAMUSCULAR; INTRAVENOUS 2 TIMES DAILY
Status: DISCONTINUED | OUTPATIENT
Start: 2023-09-06 | End: 2023-09-07 | Stop reason: HOSPADM

## 2023-09-06 RX ORDER — ACETAMINOPHEN 325 MG/1
2 TABLET ORAL EVERY 6 HOURS PRN
Status: ON HOLD | COMMUNITY

## 2023-09-06 RX ORDER — ONDANSETRON 4 MG/1
1 TABLET, FILM COATED ORAL EVERY 8 HOURS PRN
Status: ON HOLD | COMMUNITY
End: 2023-09-18

## 2023-09-06 RX ORDER — DILTIAZEM HCL/D5W 125 MG/125
5-15 PLASTIC BAG, INJECTION (ML) INTRAVENOUS
Status: DISCONTINUED | OUTPATIENT
Start: 2023-09-06 | End: 2023-09-06

## 2023-09-06 RX ORDER — METOPROLOL SUCCINATE 25 MG/1
25 TABLET, EXTENDED RELEASE ORAL EVERY 24 HOURS
Status: DISCONTINUED | OUTPATIENT
Start: 2023-09-06 | End: 2023-09-07 | Stop reason: HOSPADM

## 2023-09-06 RX ORDER — TRAZODONE HYDROCHLORIDE 50 MG/1
0.5 TABLET ORAL DAILY PRN
Status: ON HOLD | COMMUNITY

## 2023-09-06 RX ORDER — SILDENAFIL CITRATE 20 MG/1
1 TABLET ORAL 3 TIMES DAILY
Status: ON HOLD | COMMUNITY
End: 2023-09-18

## 2023-09-06 RX ORDER — LIDOCAINE 4 G/G
1 PATCH TOPICAL EVERY 24 HOURS
Status: ON HOLD | COMMUNITY
End: 2023-09-18

## 2023-09-06 RX ORDER — PANTOPRAZOLE SODIUM 40 MG/1
40 TABLET, DELAYED RELEASE ORAL
Status: DISCONTINUED | OUTPATIENT
Start: 2023-09-06 | End: 2023-09-07 | Stop reason: HOSPADM

## 2023-09-06 RX ORDER — GABAPENTIN 600 MG/1
1 TABLET ORAL 4 TIMES DAILY
Status: ON HOLD | COMMUNITY
End: 2023-09-18

## 2023-09-06 RX ADMIN — PANTOPRAZOLE SODIUM 40 MG: 40 TABLET, DELAYED RELEASE ORAL at 17:28

## 2023-09-06 RX ADMIN — PANTOPRAZOLE SODIUM 40 MG: 40 TABLET, DELAYED RELEASE ORAL at 12:37

## 2023-09-06 RX ADMIN — SODIUM CHLORIDE, POTASSIUM CHLORIDE, SODIUM LACTATE AND CALCIUM CHLORIDE 100 ML/HR: 600; 310; 30; 20 INJECTION, SOLUTION INTRAVENOUS at 12:48

## 2023-09-06 RX ADMIN — Medication 10 ML: at 08:01

## 2023-09-06 RX ADMIN — DIGOXIN 125 MCG: 125 TABLET ORAL at 12:37

## 2023-09-06 RX ADMIN — GABAPENTIN 600 MG: 600 TABLET, FILM COATED ORAL at 07:41

## 2023-09-06 RX ADMIN — SODIUM CHLORIDE, POTASSIUM CHLORIDE, SODIUM LACTATE AND CALCIUM CHLORIDE 100 ML/HR: 600; 310; 30; 20 INJECTION, SOLUTION INTRAVENOUS at 07:42

## 2023-09-06 RX ADMIN — METOPROLOL SUCCINATE 25 MG: 25 TABLET, EXTENDED RELEASE ORAL at 17:28

## 2023-09-06 RX ADMIN — ACETAMINOPHEN 650 MG: 325 TABLET, FILM COATED ORAL at 04:28

## 2023-09-06 RX ADMIN — ROPINIROLE HYDROCHLORIDE 0.25 MG: 0.25 TABLET, FILM COATED ORAL at 20:40

## 2023-09-06 RX ADMIN — GABAPENTIN 600 MG: 600 TABLET, FILM COATED ORAL at 12:37

## 2023-09-06 RX ADMIN — METOCLOPRAMIDE 10 MG: 5 INJECTION, SOLUTION INTRAMUSCULAR; INTRAVENOUS at 20:39

## 2023-09-06 RX ADMIN — APIXABAN 5 MG: 5 TABLET, FILM COATED ORAL at 20:40

## 2023-09-06 RX ADMIN — LEVOTHYROXINE SODIUM 88 MCG: 0.09 TABLET ORAL at 08:00

## 2023-09-06 RX ADMIN — GABAPENTIN 600 MG: 600 TABLET, FILM COATED ORAL at 17:28

## 2023-09-06 RX ADMIN — SERTRALINE 50 MG: 50 TABLET, FILM COATED ORAL at 20:40

## 2023-09-06 RX ADMIN — ONDANSETRON 4 MG: 2 INJECTION INTRAMUSCULAR; INTRAVENOUS at 01:58

## 2023-09-06 RX ADMIN — Medication 5 MG/HR: at 13:15

## 2023-09-06 RX ADMIN — HYDROCODONE BITARTRATE AND ACETAMINOPHEN 1 TABLET: 7.5; 325 TABLET ORAL at 19:22

## 2023-09-06 RX ADMIN — ATORVASTATIN CALCIUM 10 MG: 10 TABLET, FILM COATED ORAL at 20:40

## 2023-09-06 RX ADMIN — GABAPENTIN 600 MG: 600 TABLET, FILM COATED ORAL at 20:40

## 2023-09-06 RX ADMIN — Medication 10 ML: at 21:44

## 2023-09-06 NOTE — CONSULTS
CARDIOLOGY CONSULT:    Catalina Moreno  1940  female  [id number]      Referring Provider: Hospitalist  Reason for Consultation: Atrial fibrillation    Patient Care Team:  Rubio Dover MD as PCP - General (Family Medicine)  Flash Gonzalez MD as Consulting Physician (Cardiology)    Chief complaint fatigue and tiredness    Subjective .     History of present illness:  Catalina Moreno is a 83 y.o. female with history of atrial fibrillation sick sinus syndrome with pacemaker placement hypertension hyperlipidemia coronary artery disease congestive heart failure with systolic dysfunction presented to the hospital complains of deric fatigue and tiredness.  No complaint of any chest pain.  No PND orthopnea.  No palpitation dizziness syncope or swelling of the feet.  She also had diarrhea.  When she presented to the ER she had atrial fibrillation with rapid response and hence was admitted to the hospital started on Cardizem drip.  She says that she is taking all her medicines regularly.       Review of Systems   Constitutional: Positive for malaise/fatigue. Negative for fever.   HENT:  Negative for ear pain and nosebleeds.    Eyes:  Negative for blurred vision and double vision.   Cardiovascular:  Negative for chest pain, dyspnea on exertion and palpitations.   Respiratory:  Negative for cough and shortness of breath.    Skin:  Negative for rash.   Musculoskeletal:  Negative for joint pain.   Gastrointestinal:  Positive for diarrhea. Negative for abdominal pain, nausea and vomiting.   Neurological:  Negative for focal weakness and headaches.   Psychiatric/Behavioral:  Negative for depression. The patient is not nervous/anxious.    All other systems reviewed and are negative.    History  Past Medical History:   Diagnosis Date    Acute kidney injury 07/22/2022    Anxiety     Arthritis     Atrial fibrillation     paroxysmal    Broken shoulder     left-- due to fall 11-7-19 was at [facility]    Chronic systolic CHF  (congestive heart failure) 01/05/2023    EF 36-40%    Coronary artery disease involving native coronary artery of native heart without angina pectoris 04/02/2021    nonobstructive    DDD (degenerative disc disease), lumbar     Depression     Edema     9/2020 foot    GERD (gastroesophageal reflux disease)     H/O fall     Heart failure with mid-range ejection fraction 03/05/2022    EF 45% per 2D echo    Hypertension     Hypothyroidism     Insomnia     Low back pain     Moderate mitral regurgitation 1/5/2023    Neuropathy     Pain in both feet     PONV (postoperative nausea and vomiting)     Pulmonary hypertension     Radiculopathy     Restless legs     Spondylolisthesis     Stage 3a chronic kidney disease     Urinary incontinence        Past Surgical History:   Procedure Laterality Date    BACK SURGERY  07/19/2018    PDC &  PSF L3-L5 insitu    CARDIAC CATHETERIZATION N/A 4/2/2021    Procedure: Cardiac Catheterization/Vascular Study;  Surgeon: Barrett Evans MD;  Location: [facility] CATH INVASIVE LOCATION;  Service: Cardiovascular;  Laterality: N/A;    CARDIAC ELECTROPHYSIOLOGY PROCEDURE N/A 1/17/2023    Procedure: Pacemaker DC new;  Surgeon: Baljeet Valero MD;  Location: [facility] CATH INVASIVE LOCATION;  Service: Cardiovascular;  Laterality: N/A;    CHOLECYSTECTOMY      COLONOSCOPY      HYSTERECTOMY      ROTATOR CUFF REPAIR Left        Family History   Problem Relation Age of Onset    Cancer Father     Diabetes Son     Cancer Paternal Aunt     Heart disease Paternal Aunt     Cancer Paternal Uncle        Social History     Tobacco Use    Smoking status: Never    Smokeless tobacco: Never   Vaping Use    Vaping Use: Never used   Substance Use Topics    Alcohol use: No    Drug use: Never        (Not in a hospital admission)      Allergies: Baclofen, Codeine, Ibuprofen, Methocarbamol, Naproxen, Tizanidine hcl, and Tolmetin    Scheduled Meds:atorvastatin, 10 mg, Oral, Nightly  digoxin, 125 mcg, Oral,  "Daily  gabapentin, 600 mg, Oral, 4x Daily  levothyroxine, 88 mcg, Oral, Daily  lidocaine, 2 patch, Transdermal, Q24H  metoclopramide, 10 mg, Intravenous, BID  pantoprazole, 40 mg, Oral, BID AC  rOPINIRole, 0.25 mg, Oral, Nightly  senna-docusate sodium, 2 tablet, Oral, BID  sertraline, 50 mg, Oral, Nightly  sodium chloride, 10 mL, Intravenous, Q12H      Continuous Infusions:dilTIAZem, 5-15 mg/hr, Last Rate: 5 mg/hr (09/06/23 1315)  lactated ringers, 100 mL/hr, Last Rate: 100 mL/hr (09/06/23 1248)      PRN Meds:.  acetaminophen    senna-docusate sodium **AND** polyethylene glycol **AND** bisacodyl **AND** bisacodyl    Calcium Replacement - Follow Nurse / BPA Driven Protocol    HYDROcodone-acetaminophen    Magnesium Standard Dose Replacement - Follow Nurse / BPA Driven Protocol    ondansetron **OR** ondansetron    Phosphorus Replacement - Follow Nurse / BPA Driven Protocol    Potassium Replacement - Follow Nurse / BPA Driven Protocol    sodium chloride    sodium chloride    temazepam    Objective     VITAL SIGNS  Vitals:    09/06/23 0649 09/06/23 1157 09/06/23 1237 09/06/23 1315   BP: 144/72 151/92 155/85 155/92   BP Location: Right arm Right arm     Patient Position: Lying Lying     Pulse: 104 90 114 (!) 135   Resp: 13 16     Temp: 97.6 °F (36.4 °C) 98 °F (36.7 °C)     TempSrc: Oral Oral     SpO2: 98% 93%     Weight:       Height:           Flowsheet Rows      Flowsheet Row First Filed Value   Admission Height 160 cm (63\") Documented at 09/05/2023 1503   Admission Weight 69.9 kg (154 lb) Documented at 09/05/2023 1503             TELEMETRY: Atrial fibrillation with rapid response    Physical Exam:  Constitutional:       Appearance: Well-developed.   Eyes:      General: No scleral icterus.     Conjunctiva/sclera: Conjunctivae normal.      Pupils: Pupils are equal, round, and reactive to light.   HENT:      Head: Normocephalic and atraumatic.   Neck:      Vascular: No carotid bruit or JVD.   Pulmonary:      Effort: " Pulmonary effort is normal.      Breath sounds: Normal breath sounds. No wheezing. No rales.   Cardiovascular:      Normal rate. Irregularly irregular rhythm.      Murmurs: There is a systolic murmur.   Pulses:     Intact distal pulses.   Abdominal:      General: Bowel sounds are normal.      Palpations: Abdomen is soft.   Musculoskeletal: Normal range of motion.      Cervical back: Normal range of motion and neck supple. Skin:     General: Skin is warm and dry.      Findings: No rash.   Neurological:      Mental Status: Alert.      Comments: No focal deficits        Results Review:   I reviewed the patient's new clinical results.  Lab Results (last 24 hours)       Procedure Component Value Units Date/Time    TSH [[id number]]  (Abnormal) Collected: 09/06/23 0436    Specimen: Blood Updated: 09/06/23 0549     TSH 5.460 uIU/mL     Comprehensive Metabolic Panel [[id number]]  (Abnormal) Collected: 09/06/23 0436    Specimen: Blood Updated: 09/06/23 0548     Glucose 101 mg/dL      BUN 18 mg/dL      Creatinine 0.83 mg/dL      Sodium 139 mmol/L      Potassium 4.2 mmol/L      Comment: Slight hemolysis detected by analyzer. Results may be affected.        Chloride 106 mmol/L      CO2 24.0 mmol/L      Calcium 8.7 mg/dL      Total Protein 6.5 g/dL      Albumin 3.8 g/dL      ALT (SGPT) 17 U/L      AST (SGOT) 54 U/L      Comment: Slight hemolysis detected by analyzer. Results may be affected.        Alkaline Phosphatase 95 U/L      Total Bilirubin 1.6 mg/dL      Globulin 2.7 gm/dL      A/G Ratio 1.4 g/dL      BUN/Creatinine Ratio 21.7     Anion Gap 9.0 mmol/L      eGFR 70.5 mL/min/1.73     Narrative:      GFR Normal >60  Chronic Kidney Disease <60  Kidney Failure <15    The GFR formula is only valid for adults with stable renal function between ages 18 and 70.    CBC Auto Differential [[id number]]  (Abnormal) Collected: 09/06/23 0436    Specimen: Blood Updated: 09/06/23 0528     WBC 7.30 10*3/mm3      RBC 3.84 10*6/mm3       Hemoglobin 10.5 g/dL      Hematocrit 32.9 %      MCV 85.6 fL      MCH 27.3 pg      MCHC 31.9 g/dL      RDW 16.9 %      RDW-SD 53.8 fl      MPV 8.6 fL      Platelets 212 10*3/mm3      Neutrophil % 70.1 %      Lymphocyte % 18.7 %      Monocyte % 9.4 %      Eosinophil % 0.9 %      Basophil % 0.9 %      Neutrophils, Absolute 5.10 10*3/mm3      Lymphocytes, Absolute 1.40 10*3/mm3      Monocytes, Absolute 0.70 10*3/mm3      Eosinophils, Absolute 0.10 10*3/mm3      Basophils, Absolute 0.10 10*3/mm3      nRBC 0.3 /100 WBC     Folate [[id number]]  (Normal) Collected: 09/05/23 1857    Specimen: Blood Updated: 09/06/23 0030     Folate 9.46 ng/mL     Narrative:      Results may be falsely increased if patient taking Biotin.      Vitamin B12 [[id number]]  (Normal) Collected: 09/05/23 1857    Specimen: Blood Updated: 09/06/23 0030     Vitamin B-12 431 pg/mL     Narrative:      Results may be falsely increased if patient taking Biotin.      T4, Free [[id number]]  (Abnormal) Collected: 09/05/23 1532    Specimen: Blood Updated: 09/05/23 1923     Free T4 0.91 ng/dL     Narrative:      Results may be falsely increased if patient taking Biotin.      Iron [[id number]]  (Normal) Collected: 09/05/23 1532    Specimen: Blood Updated: 09/05/23 1917     Iron 58 mcg/dL     C-reactive Protein [[id number]]  (Abnormal) Collected: 09/05/23 1532    Specimen: Blood Updated: 09/05/23 1917     C-Reactive Protein 0.57 mg/dL     Magnesium [[id number]]  (Normal) Collected: 09/05/23 1532    Specimen: Blood Updated: 09/05/23 1917     Magnesium 2.0 mg/dL     Celiac Ab tTG DGP TIgA [[id number]] Collected: 09/05/23 1857    Specimen: Blood Updated: 09/05/23 1901            Imaging Results (Last 24 Hours)       ** No results found for the last 24 hours. **            EKG      I personally viewed and interpreted the patient's EKG/Telemetry data:    ECHOCARDIOGRAM:  Results for orders placed during the hospital encounter of 01/04/23    Adult Transthoracic Echo Complete  With Contrast if Necessary Per Protocol    Interpretation Summary    Left ventricular ejection fraction appears to be 36 - 40%.    There is calcification of the aortic valve.    Moderate mitral valve regurgitation is present.    Estimated right ventricular systolic pressure from tricuspid regurgitation is normal (<35 mmHg).    No pericardial effusion noted         STRESS MYOVIEW:  Results for orders placed during the hospital encounter of 03/31/21    Stress Test With Myocardial Perfusion One Day    Interpretation Summary  · Findings consistent with a normal ECG stress test.  · Left ventricular ejection fraction is hyperdynamic (Calculated EF > 70%). .  · Impressions are consistent with a study indicating uncertain risk.  · Large apical defect with some reperfusion during the rest images cannot rule out ischemia EF 76% Clinical correlation is recommended.    Electronically signed by KALYANI Tay, 04/01/21, 11:25 AM EDT.       CARDIAC CATHETERIZATION:    Results for orders placed during the hospital encounter of 03/31/21    Cardiac Catheterization/Vascular Study    Narrative  CARDIAC CATHETERIZATION REPORT    DATE OF PROCEDURE: 4/2/2021      INDICATION FOR PROCEDURE: Abnormal stress test    PROCEDURE PERFORMED: Left heart catheterization, coronary angiography,    PROCEDURE COMMENTS:    All the risks and benefits explained with the patient informed consent was obtained from the patient.  Patient was brought to the cardiac catheterization laboratory placed on the table draped and prepped in the usual sterile fashion.  2% lidocaine was used to anesthetize the right groin area.  Using the modified Seldinger technique 5 Swedish sheath was inserted into the right femoral artery.    5 Swedish JL4 catheter was used to perform the selective left coronary angiography    5 Swedish JR4 catheter was used to perform the selective right coronary angiography    Angio-Seal was placed after exchanging five Swedish sheath with  six Haitian sheath    Patient tolerated procedure well without any immediate complications      FINDINGS:    1. HEMODYNAMICS:  LV end-diastolic pressure 7 mmHg, /80 mmHg.    2. LEFT VENTRICULOGRAPHY: Not done patient had echocardiogram    3. CORONARY ANGIOGRAPHY:  Dominance right  Left Main: Large-caliber vessel bifurcates into LAD circumflex artery 0% stenosis  LAD: Large-caliber vessel gives rise to couple of medium caliber diagonal arteries multiple septal branches reaches the apex mild luminal irregularities noted less than 10% stenosis  CIRC: Large-caliber vessel gives rise to marginal lateral branches less than 10% stenosis  RCA: Large-caliber dominant artery gives rise to PDA and PL branches less than 5 to 10% stenosis    SUMMARY: No obstructive coronary artery disease    RECOMMENDATIONS: Evaluate for noncardiac causes of symptoms aggressive cardiac risk factor modification       OTHER:         MDM      Atrial fibrillation with rapid response  Patient presented with diarrhea and fatigue and had electrolytes are normal  Her TSH is high and is being followed  Patient is currently on Cardizem drip but I will start her on low-dose beta-blockers and stop the Cardizem drip  Patient was on sotalol and Eliquis and digoxin at home but she is not on those at this time and hence I will restart Eliquis and digoxin but also placed on low-dose beta-blockers because of LV dysfunction    Congestive heart failure  Patient is history of congestive heart failure and her EF is about 35 to 40% with moderate mitral regurgitation will have an echocardiogram performed  Patient will be started on beta-blockers and will depending on the blood pressure started on Entresto also    Coronary disease  Patient has nonobstructive disease in the past with LV dysfunction and is currently being ruled out for MI by EKG and enzymes    Hypertension  Patient blood pressure actually stable and hence I will start him on beta-blockers    Sick  sinus syndrome status post pacemaker placement  Patient has a pacemaker which is working very well.    I discussed the patients findings and my recommendations with patient and nurse    Flash Gonzalez MD  09/06/23  16:58 EDT

## 2023-09-06 NOTE — CASE MANAGEMENT/SOCIAL WORK
Continued Stay Note  KADEEM Rios     Patient Name: Catalina Moreno  MRN: [id number]  Today's Date: 9/6/2023    Admit Date: 9/5/2023    Plan: DC PLAN: Routine home       Discharge Plan       Row Name 09/06/23 1022       Plan    Plan DC PLAN: Routine home    Plan Comments DC BARRIERS: Diarrhea, dehydration. Stool studies, and GI consult. Possible DC today or tomorrow.                    Expected Discharge Date and Time       Expected Discharge Date Expected Discharge Time    Sep 6, 2023           Dipti Gonzalez RN

## 2023-09-06 NOTE — PLAN OF CARE
Problem: Adult Inpatient Plan of Care  Goal: Plan of Care Review  Outcome: Ongoing, Progressing  Flowsheets (Taken 9/6/2023 1636)  Progress: no change  Goal: Patient-Specific Goal (Individualized)  Outcome: Ongoing, Progressing  Goal: Absence of Hospital-Acquired Illness or Injury  Outcome: Ongoing, Progressing  Intervention: Identify and Manage Fall Risk  Recent Flowsheet Documentation  Taken 9/6/2023 1634 by Blanca Samayoa LPN  Safety Promotion/Fall Prevention:   assistive device/personal items within reach   clutter free environment maintained   fall prevention program maintained   nonskid shoes/slippers when out of bed   safety round/check completed   room organization consistent  Taken 9/6/2023 1412 by Blanca Samayoa LPN  Safety Promotion/Fall Prevention:   assistive device/personal items within reach   clutter free environment maintained   fall prevention program maintained   nonskid shoes/slippers when out of bed   room organization consistent   safety round/check completed  Taken 9/6/2023 1236 by Blanca Samayoa LPN  Safety Promotion/Fall Prevention:   assistive device/personal items within reach   clutter free environment maintained   fall prevention program maintained   nonskid shoes/slippers when out of bed   room organization consistent   safety round/check completed  Taken 9/6/2023 1046 by Blanca Samayoa LPN  Safety Promotion/Fall Prevention:   assistive device/personal items within reach   clutter free environment maintained   fall prevention program maintained   nonskid shoes/slippers when out of bed   safety round/check completed   room organization consistent  Taken 9/6/2023 0745 by Blanca Samayoa LPN  Safety Promotion/Fall Prevention:   assistive device/personal items within reach   clutter free environment maintained   fall prevention program maintained   nonskid shoes/slippers when out of bed   safety round/check completed   room organization consistent  Intervention: Prevent Skin Injury  Recent  Flowsheet Documentation  Taken 9/6/2023 1634 by Blanca Samayoa LPN  Body Position: position changed independently  Taken 9/6/2023 1236 by Blanca Samayoa LPN  Body Position: position changed independently  Taken 9/6/2023 0745 by Blanca Samayoa LPN  Body Position: position changed independently  Intervention: Prevent and Manage VTE (Venous Thromboembolism) Risk  Recent Flowsheet Documentation  Taken 9/6/2023 1634 by Blanca Samayoa LPN  VTE Prevention/Management: patient refused intervention  Taken 9/6/2023 1236 by Blanca Samayoa LPN  VTE Prevention/Management: patient refused intervention  Taken 9/6/2023 0745 by Blanca Samayoa LPN  VTE Prevention/Management: sequential compression devices off  Intervention: Prevent Infection  Recent Flowsheet Documentation  Taken 9/6/2023 1634 by Blanca Samayoa LPN  Infection Prevention:   environmental surveillance performed   hand hygiene promoted   single patient room provided   rest/sleep promoted  Taken 9/6/2023 1412 by Blanca Samayoa LPN  Infection Prevention:   environmental surveillance performed   hand hygiene promoted   rest/sleep promoted   single patient room provided  Taken 9/6/2023 1236 by Blanca Samayoa LPN  Infection Prevention:   environmental surveillance performed   hand hygiene promoted   rest/sleep promoted   single patient room provided  Taken 9/6/2023 1046 by Blanca Samayoa LPN  Infection Prevention:   environmental surveillance performed   hand hygiene promoted   rest/sleep promoted   single patient room provided  Taken 9/6/2023 0745 by Blanca Samayoa LPN  Infection Prevention:   environmental surveillance performed   hand hygiene promoted   rest/sleep promoted   single patient room provided  Goal: Optimal Comfort and Wellbeing  Outcome: Ongoing, Progressing  Intervention: Provide Person-Centered Care  Recent Flowsheet Documentation  Taken 9/6/2023 1634 by Blanca Samayoa LPN  Trust Relationship/Rapport: care explained  Taken 9/6/2023 1236 by Blanca Samayoa  LPN  Trust Relationship/Rapport: care explained  Taken 9/6/2023 0745 by Blanca Samayoa LPN  Trust Relationship/Rapport: care explained  Goal: Readiness for Transition of Care  Outcome: Ongoing, Progressing     Problem: Breathing Pattern Ineffective  Goal: Effective Breathing Pattern  Outcome: Ongoing, Progressing  Intervention: Promote Improved Breathing Pattern  Recent Flowsheet Documentation  Taken 9/6/2023 1634 by Blanca Samayoa LPN  Head of Bed (HOB) Positioning: HOB elevated  Taken 9/6/2023 1236 by Blanca Samayoa LPN  Head of Bed (HOB) Positioning: HOB elevated  Taken 9/6/2023 0745 by Blanca Samayoa LPN  Head of Bed (HOB) Positioning: HOB flat   Goal Outcome Evaluation:           Progress: no change     Consults called for cardiology and GI.

## 2023-09-06 NOTE — CONSULTS
GI CONSULT  NOTE:    Referring Provider:  Dr. Mares    Chief complaint: Abdominal pain    Subjective .     History of present illness: Catalina Moreno is a 83 y.o. female with history of hypertension, hyperlipidemia, CHF, A-fib on Eliquis, SSS s/p pacemaker, hypothyroidism, and anxiety/depression who presents with complaints of diarrhea and fatigue.  The patient reports that she has been having intermittent diarrhea for quite some time.  Reports that she will take Imodium or Pepto-Bismol on an as-needed basis.  Denies bright red blood per rectum or melena.  She also complains of upper abdominal pain that is intermittent.  She is unable to identify any exacerbating or alleviating factors.  Describes nausea, but denies vomiting.  Also reports heartburn and decreased appetite.  Denies dysphagia.  No recent fever.      Endo History:  Patient reports more recent colonoscopy by Dr. Ortiz in [place].  9/2019 colonoscopy (Dr. Schaffer) -polyp (TA), 1.2 cm polyp not removed due to anticoagulation, grade 2 internal hemorrhoids  7/2012 EGD -bile gastritis    Past Medical History:  Past Medical History:   Diagnosis Date    Acute kidney injury 07/22/2022    Anxiety     Arthritis     Atrial fibrillation     paroxysmal    Broken shoulder     left-- due to fall 11-7-19 was at [facility]    Chronic systolic CHF (congestive heart failure) 01/05/2023    EF 36-40%    Coronary artery disease involving native coronary artery of native heart without angina pectoris 04/02/2021    nonobstructive    DDD (degenerative disc disease), lumbar     Depression     Edema     9/2020 foot    GERD (gastroesophageal reflux disease)     H/O fall     Heart failure with mid-range ejection fraction 03/05/2022    EF 45% per 2D echo    Hypertension     Hypothyroidism     Insomnia     Low back pain     Moderate mitral regurgitation 1/5/2023    Neuropathy     Pain in both feet     PONV (postoperative nausea and vomiting)     Pulmonary hypertension      Radiculopathy     Restless legs     Spondylolisthesis     Stage 3a chronic kidney disease     Urinary incontinence        Past Surgical History:  Past Surgical History:   Procedure Laterality Date    BACK SURGERY  07/19/2018    PDC &  PSF L3-L5 insitu    CARDIAC CATHETERIZATION N/A 4/2/2021    Procedure: Cardiac Catheterization/Vascular Study;  Surgeon: Barrett Evans MD;  Location:  [facility];  Service: Cardiovascular;  Laterality: N/A;    CARDIAC ELECTROPHYSIOLOGY PROCEDURE N/A 1/17/2023    Procedure: Pacemaker DC new;  Surgeon: Baljeet Valero MD;  Location:  [facility];  Service: Cardiovascular;  Laterality: N/A;    CHOLECYSTECTOMY      COLONOSCOPY      HYSTERECTOMY      ROTATOR CUFF REPAIR Left        Social History:  Social History     Tobacco Use    Smoking status: Never    Smokeless tobacco: Never   Vaping Use    Vaping Use: Never used   Substance Use Topics    Alcohol use: No    Drug use: Never       Family History:  Family History   Problem Relation Age of Onset    Cancer Father     Diabetes Son     Cancer Paternal Aunt     Heart disease Paternal Aunt     Cancer Paternal Uncle        Medications:  (Not in a hospital admission)      Scheduled Meds:atorvastatin, 10 mg, Oral, Nightly  digoxin, 125 mcg, Oral, Daily  gabapentin, 600 mg, Oral, 4x Daily  levothyroxine, 88 mcg, Oral, Daily  lidocaine, 2 patch, Transdermal, Q24H  pantoprazole, 40 mg, Oral, Daily With Lunch  rOPINIRole, 0.25 mg, Oral, Nightly  senna-docusate sodium, 2 tablet, Oral, BID  sertraline, 50 mg, Oral, Nightly  sodium chloride, 10 mL, Intravenous, Q12H      Continuous Infusions:dilTIAZem, 5-15 mg/hr, Last Rate: 5 mg/hr (09/06/23 1315)  lactated ringers, 100 mL/hr, Last Rate: 100 mL/hr (09/06/23 1248)      PRN Meds:.  acetaminophen    senna-docusate sodium **AND** polyethylene glycol **AND** bisacodyl **AND** bisacodyl    Calcium Replacement - Follow Nurse / BPA Driven Protocol     HYDROcodone-acetaminophen    Magnesium Standard Dose Replacement - Follow Nurse / BPA Driven Protocol    ondansetron **OR** ondansetron    Phosphorus Replacement - Follow Nurse / BPA Driven Protocol    Potassium Replacement - Follow Nurse / BPA Driven Protocol    sodium chloride    sodium chloride    temazepam    ALLERGIES:  Baclofen, Codeine, Ibuprofen, Methocarbamol, Naproxen, Tizanidine hcl, and Tolmetin    ROS:  The following systems were reviewed and negative;   Constitution:  No fevers, chills, no unintentional weight loss  Skin: no rash, no jaundice  Eyes:  No blurry vision, no eye pain  HENT:  No change in hearing or smell  Resp:  No dyspnea or cough  CV:  No chest pain or palpitations  :  No dysuria, hematuria  Musculoskeletal:  No leg cramps or arthralgias  Neuro:  No tremor, no numbness  Psych:  No depression or confusion    Objective     Vital Signs:   Vitals:    09/06/23 0649 09/06/23 1157 09/06/23 1237 09/06/23 1315   BP: 144/72 151/92 155/85 155/92   BP Location: Right arm Right arm     Patient Position: Lying Lying     Pulse: 104 90 114 (!) 135   Resp: 13 16     Temp: 97.6 °F (36.4 °C) 98 °F (36.7 °C)     TempSrc: Oral Oral     SpO2: 98% 93%     Weight:       Height:           Physical Exam:       General Appearance:    Awake and alert, in no acute distress   Head:    Normocephalic, without obvious abnormality, atraumatic   Throat:   No oral lesions, no thrush, oral mucosa moist   Lungs:     Respirations regular, even and unlabored   Chest Wall:    No abnormalities observed   Abdomen:     Soft, mild upper abdominal tenderness, no rebound or guarding, non-distended   Rectal:     Deferred   Extremities:   Moves all extremities, no edema, no cyanosis   Pulses:   Pulses palpable and equal bilaterally   Skin:   No rash, no jaundice, normal palpation   Lymph nodes:   No cervical, supraclavicular or submandibular palpable adenopathy   Neurologic:   Cranial nerves 2 - 12 grossly intact, no asterixis        Results Review:   I reviewed the patient's labs and imaging.  CBC    Results from last 7 days   Lab Units 09/06/23  0436 09/05/23  1532   WBC 10*3/mm3 7.30 7.30   HEMOGLOBIN g/dL 10.5* 12.1   PLATELETS 10*3/mm3 212 258     CMP   Results from last 7 days   Lab Units 09/06/23  0436 09/05/23  1532   SODIUM mmol/L 139 140   POTASSIUM mmol/L 4.2 3.9   CHLORIDE mmol/L 106 106   CO2 mmol/L 24.0 22.0   BUN mg/dL 18 15   CREATININE mg/dL 0.83 0.66   GLUCOSE mg/dL 101* 102*   ALBUMIN g/dL 3.8 4.2   BILIRUBIN mg/dL 1.6* 1.6*   ALK PHOS U/L 95 82   AST (SGOT) U/L 54* 17   ALT (SGPT) U/L 17 6   MAGNESIUM mg/dL  --  2.0   LIPASE U/L  --  11*     Cr Clearance Estimated Creatinine Clearance: 49 mL/min (by C-G formula based on SCr of 0.83 mg/dL).  Coag     HbA1C   Lab Results   Component Value Date    HGBA1C 5.9 (H) 01/05/2023    HGBA1C 6.0 (H) 11/13/2021    HGBA1C 5.6 06/04/2019     Blood Glucose No results found for: POCGLU  Infection     UA    Results from last 7 days   Lab Units 09/05/23  1550   NITRITE UA  Negative     Radiology(recent) CT Abdomen Pelvis Without Contrast    Result Date: 9/5/2023  Impression: No acute CT abnormalities in the abdomen or pelvis Electronically Signed: Jake Song MD  9/5/2023 4:37 PM EDT  Workstation ID: [id number]        ASSESSMENT:  -Upper abdominal pain  -Nausea  -Dyspepsia  -Loss of appetite  -Diarrhea  -Elevated LFTs  -Mild normocytic anemia  -A-fib on Eliquis  -SSS s/p pacemaker  -Hypertension  -Hyperlipidemia  -CHF  -History of cholecystectomy    PLAN:  Patient is an 83-year-old female with history of SSS s/p pacemaker placement and A-fib on Eliquis who presented on 9/5 with complaints of fatigue and diarrhea.    CT abdomen/pelvis WO does not show any acute abnormality.  Decrease diet to clear liquids.  Increase PPI to twice daily dosing.  Add low-dose Reglan.  Could consider EGD for no improvement in symptoms.  Mild elevation in LFTs noted.  We will send liver serologies.  Consider  RUQ US for persistent elevation.  Supportive care.      I discussed the patients findings and my recommendations with the patient.  I will discuss case with Dr. Quiroga and change the plan accordingly.    We appreciate the referral.    Electronically signed by KALYANI Dowell, 09/06/23, 1:32 PM EDT.

## 2023-09-06 NOTE — H&P
[facility] Medicine Services  History & Physical    Patient Name: Catalina Moreno  : 1940  MRN: [id number]  Primary Care Physician:  Rubio Dover MD  Date of admission: 2023  Date and Time of Service: 2023 at 1800    Subjective      Chief Complaint: Fatigue    History of Present Illness: Catalina Moreno is a 83 y.o. female with a CMH of HTN, HLD, CHF, Afib, Hypothyroidism, pacemaker for SSS, and GERD who presented to [facility] on 2023 with fatigue.    Patient has a history of erratic bouts of diarrhea.  For the last few days she has been having non-bloody diarrhea and afterwards she was feeling weak and fatigued.  In this episode and in past episodes, there was no focal abdominal pain, NV, FC, or any sick contacts.  She cannot think of any new medications or new foods.  Cannot think of any foods that make it worse.  This has been going on for years.    In the ED they started hydration on the patient, but she was still not feeling well.      Review of Systems   Constitutional:  Positive for fatigue. Negative for chills and fever.   HENT:  Negative for congestion and rhinorrhea.    Eyes:  Negative for visual disturbance.   Respiratory:  Negative for shortness of breath.    Cardiovascular:  Negative for chest pain.   Gastrointestinal:  Positive for diarrhea. Negative for abdominal pain, nausea and vomiting.   Endocrine: Negative for polyuria.   Genitourinary:  Negative for difficulty urinating and dyspareunia.   Musculoskeletal:  Negative for back pain and myalgias.   Skin:  Negative for rash and wound.   Neurological:  Negative for weakness, numbness and headaches.   Psychiatric/Behavioral:  Negative for agitation, behavioral problems and confusion.      Personal History     Past Medical History:   Diagnosis Date    Acute kidney injury 2022    Anxiety     Arthritis     Atrial fibrillation     paroxysmal    Broken shoulder     left-- due to fall 19  was at [facility]    Chronic systolic CHF (congestive heart failure) 01/05/2023    EF 36-40%    Coronary artery disease involving native coronary artery of native heart without angina pectoris 04/02/2021    nonobstructive    DDD (degenerative disc disease), lumbar     Depression     Edema     9/2020 foot    GERD (gastroesophageal reflux disease)     H/O fall     Heart failure with mid-range ejection fraction 03/05/2022    EF 45% per 2D echo    Hypertension     Hypothyroidism     Insomnia     Low back pain     Moderate mitral regurgitation 1/5/2023    Neuropathy     Pain in both feet     PONV (postoperative nausea and vomiting)     Pulmonary hypertension     Radiculopathy     Restless legs     Spondylolisthesis     Stage 3a chronic kidney disease     Urinary incontinence        Past Surgical History:   Procedure Laterality Date    BACK SURGERY  07/19/2018    PDC &  PSF L3-L5 insitu    CARDIAC CATHETERIZATION N/A 4/2/2021    Procedure: Cardiac Catheterization/Vascular Study;  Surgeon: Barrett Evans MD;  Location: [facility] CATH INVASIVE LOCATION;  Service: Cardiovascular;  Laterality: N/A;    CARDIAC ELECTROPHYSIOLOGY PROCEDURE N/A 1/17/2023    Procedure: Pacemaker DC new;  Surgeon: Baljeet Valero MD;  Location: [facility] CATH INVASIVE LOCATION;  Service: Cardiovascular;  Laterality: N/A;    CHOLECYSTECTOMY      COLONOSCOPY      HYSTERECTOMY      ROTATOR CUFF REPAIR Left        Family History: family history includes Cancer in her father, paternal aunt, and paternal uncle; Diabetes in her son; Heart disease in her paternal aunt. Otherwise pertinent FHx was reviewed and not pertinent to current issue.    Social History:  reports that she has never smoked. She has never used smokeless tobacco. She reports that she does not drink alcohol and does not use drugs.    Home Medications:  Prior to Admission Medications       Prescriptions Last Dose Informant Patient Reported? Taking?    apixaban (ELIQUIS) 5 MG tablet  tablet 9/4/2023  No Yes    Take 1 tablet by mouth Every 12 (Twelve) Hours. Indications: Atrial Fibrillation    atorvastatin (LIPITOR) 10 MG tablet 9/4/2023  No Yes    Take 1 tablet by mouth Every Night.    digoxin (LANOXIN) 125 MCG tablet 9/4/2023  No Yes    Take 1 tablet by mouth Daily.    gabapentin (NEURONTIN) 600 MG tablet 9/4/2023  No Yes    TAKE 1 TABLET BY MOUTH FOUR TIMES DAILY    HYDROcodone-acetaminophen (NORCO) 7.5-325 MG per tablet 9/4/2023  No Yes    Take 1 tablet by mouth 4 (Four) Times a Day As Needed for Severe Pain. DNF before 11/2/2022    levothyroxine (SYNTHROID, LEVOTHROID) 88 MCG tablet 9/4/2023  Yes Yes    Take 1 tablet by mouth Daily. Indications: Underactive Thyroid    lidocaine (LIDODERM) 5 % 9/4/2023  No Yes    UNWRAP AND APPLY 2 PATCH(ES) ON THE SKIN DAILY AS DIRECTED BY PROVIDER. REMOVE AND DISCARD PATCH(ES) WITHIN 12 HOURS OR AS DIRECTED BY MD    loperamide (IMODIUM A-D) 2 MG tablet 9/4/2023  Yes Yes    Take 1 tablet by mouth 4 (Four) Times a Day As Needed for Diarrhea. Indications: Diarrhea    pantoprazole (PROTONIX) 40 MG EC tablet 9/4/2023  Yes Yes    Take 1 tablet by mouth Daily With Lunch. Afternoons  Indications: Gastroesophageal Reflux Disease    rOPINIRole (REQUIP) 0.25 MG tablet 9/4/2023  No Yes    Take 1 tablet by mouth Every Night. Take 1 hour before bedtime    sertraline (ZOLOFT) 50 MG tablet 9/4/2023  Yes Yes    Take 1 tablet by mouth Every Night. Indications: Major Depressive Disorder    tiZANidine (ZANAFLEX) 4 MG tablet 9/4/2023  Yes Yes    Take 1 tablet by mouth 2 (Two) Times a Day As Needed. Indications: Musculoskeletal Pain    sotalol (BETAPACE) 80 MG tablet   No No    Take 1 tablet by mouth Every 12 (Twelve) Hours for 30 days.              Allergies:  Allergies   Allergen Reactions    Baclofen Rash    Codeine Nausea Only    Ibuprofen Unknown (See Comments)     Patient doesn't know----Motin    Methocarbamol Unknown (See Comments)     Patient doesn't know    Naproxen  Unknown (See Comments)     Pt. Doesn't know     Tizanidine Hcl Other (See Comments)     Syncope     Tolmetin Dizziness     Pt. Doesn't know-- same as Tolectin       Objective      Vitals:   Temp:  [98.2 °F (36.8 °C)-100.1 °F (37.8 °C)] 100.1 °F (37.8 °C)  Heart Rate:  [] 92  Resp:  [18] 18  BP: (124-164)/() 132/70  Body mass index is 27.28 kg/m².  Physical Exam  Constitutional:       General: She is not in acute distress.  HENT:      Head: Normocephalic and atraumatic.   Cardiovascular:      Rate and Rhythm: Normal rate and regular rhythm.      Heart sounds: Normal heart sounds.   Pulmonary:      Effort: Pulmonary effort is normal. No respiratory distress.      Breath sounds: Normal breath sounds.   Abdominal:      Palpations: Abdomen is soft.      Tenderness: There is abdominal tenderness.   Musculoskeletal:      Right lower leg: No edema.      Left lower leg: No edema.   Skin:     General: Skin is warm and dry.   Neurological:      Mental Status: She is alert.   Psychiatric:         Mood and Affect: Mood normal.         Behavior: Behavior normal.       Diagnostic Data:  Lab Results (last 24 hours)       Procedure Component Value Units Date/Time    T4, Free [[id number]]  (Abnormal) Collected: 09/05/23 1532    Specimen: Blood Updated: 09/05/23 1923     Free T4 0.91 ng/dL     Narrative:      Results may be falsely increased if patient taking Biotin.      Iron [[id number]]  (Normal) Collected: 09/05/23 1532    Specimen: Blood Updated: 09/05/23 1917     Iron 58 mcg/dL     C-reactive Protein [[id number]]  (Abnormal) Collected: 09/05/23 1532    Specimen: Blood Updated: 09/05/23 1917     C-Reactive Protein 0.57 mg/dL     Magnesium [[id number]]  (Normal) Collected: 09/05/23 1532    Specimen: Blood Updated: 09/05/23 1917     Magnesium 2.0 mg/dL     Celiac Ab tTG DGP TIgA [[id number]] Collected: 09/05/23 1857    Specimen: Blood Updated: 09/05/23 1901    Folate [[id number]] Collected: 09/05/23 1857    Specimen: Blood  Updated: 09/05/23 1901    Vitamin B12 [[id number]] Collected: 09/05/23 1857    Specimen: Blood Updated: 09/05/23 1901    Digoxin Level [[id number]]  (Abnormal) Collected: 09/05/23 1532    Specimen: Blood Updated: 09/05/23 1606     Digoxin 0.30 ng/mL     Comprehensive Metabolic Panel [[id number]]  (Abnormal) Collected: 09/05/23 1532    Specimen: Blood Updated: 09/05/23 1605     Glucose 102 mg/dL      BUN 15 mg/dL      Creatinine 0.66 mg/dL      Sodium 140 mmol/L      Potassium 3.9 mmol/L      Comment: Slight hemolysis detected by analyzer. Results may be affected.        Chloride 106 mmol/L      CO2 22.0 mmol/L      Calcium 9.2 mg/dL      Total Protein 7.5 g/dL      Albumin 4.2 g/dL      ALT (SGPT) 6 U/L      AST (SGOT) 17 U/L      Comment: Slight hemolysis detected by analyzer. Results may be affected.        Alkaline Phosphatase 82 U/L      Total Bilirubin 1.6 mg/dL      Globulin 3.3 gm/dL      A/G Ratio 1.3 g/dL      BUN/Creatinine Ratio 22.7     Anion Gap 12.0 mmol/L      eGFR 87.7 mL/min/1.73     Narrative:      GFR Normal >60  Chronic Kidney Disease <60  Kidney Failure <15    The GFR formula is only valid for adults with stable renal function between ages 18 and 70.    Urinalysis With Culture If Indicated - Straight Cath [[id number]]  (Abnormal) Collected: 09/05/23 1550    Specimen: Urine from Straight Cath Updated: 09/05/23 1604     Color, UA Dark Yellow     Comment: Result checked          Appearance, UA Clear     pH, UA 5.5     Specific Gravity, UA 1.024     Glucose, UA Negative     Ketones, UA Negative     Bilirubin, UA Negative     Blood, UA Negative     Protein, UA Trace     Leuk Esterase, UA Negative     Nitrite, UA Negative     Urobilinogen, UA 1.0 E.U./dL    Narrative:      In absence of clinical symptoms, the presence of pyuria, bacteria, and/or nitrites on the urinalysis result does not correlate with infection.  Urine microscopic not indicated.    Lipase [[id number]]  (Abnormal) Collected: 09/05/23 1532     Specimen: Blood Updated: 09/05/23 1601     Lipase 11 U/L     Single High Sensitivity Troponin T [[id number]]  (Normal) Collected: 09/05/23 1532    Specimen: Blood Updated: 09/05/23 1601     HS Troponin T 9 ng/L     Narrative:      High Sensitive Troponin T Reference Range:  <10.0 ng/L- Negative Female for AMI  <15.0 ng/L- Negative Male for AMI  >=10 - Abnormal Female indicating possible myocardial injury.  >=15 - Abnormal Male indicating possible myocardial injury.   Clinicians would have to utilize clinical acumen, EKG, Troponin, and serial changes to determine if it is an Acute Myocardial Infarction or myocardial injury due to an underlying chronic condition.         CBC & Differential [[id number]]  (Abnormal) Collected: 09/05/23 1532    Specimen: Blood Updated: 09/05/23 1539    Narrative:      The following orders were created for panel order CBC & Differential.  Procedure                               Abnormality         Status                     ---------                               -----------         ------                     CBC Auto Differential[[id number]]        Abnormal            Final result                 Please view results for these tests on the individual orders.    CBC Auto Differential [[id number]]  (Abnormal) Collected: 09/05/23 1532    Specimen: Blood Updated: 09/05/23 1539     WBC 7.30 10*3/mm3      RBC 4.39 10*6/mm3      Hemoglobin 12.1 g/dL      Hematocrit 37.5 %      MCV 85.4 fL      MCH 27.6 pg      MCHC 32.2 g/dL      RDW 17.4 %      RDW-SD 54.7 fl      MPV 8.0 fL      Platelets 258 10*3/mm3      Neutrophil % 64.7 %      Lymphocyte % 25.1 %      Monocyte % 8.7 %      Eosinophil % 1.3 %      Basophil % 0.2 %      Neutrophils, Absolute 4.70 10*3/mm3      Lymphocytes, Absolute 1.80 10*3/mm3      Monocytes, Absolute 0.60 10*3/mm3      Eosinophils, Absolute 0.10 10*3/mm3      Basophils, Absolute 0.00 10*3/mm3      nRBC 0.1 /100 WBC              Imaging Results (Last 24 Hours)        Procedure Component Value Units Date/Time    CT Abdomen Pelvis Without Contrast [[id number]] Collected: 09/05/23 1633     Updated: 09/05/23 1639    Narrative:      CT ABDOMEN PELVIS WO CONTRAST    Date of Exam: 9/5/2023 4:29 PM EDT    Indication: epigastric pain.    Comparison: 1/6/2023    Technique: Axial CT images were obtained of the abdomen and pelvis without the administration of contrast. Sagittal and coronal reconstructions were performed.  Automated exposure control and iterative reconstruction methods were used.      Findings:  Lung Bases:    Mild bibasilar linear atelectasis or scarring. Distal pacemaker leads      Liver:Liver is normal in size and CT density. No focal lesions.      Biliary/Gallbladder: Cholecystectomy changes. There is postcholecystectomy dilatation of the common bile duct      Spleen:Spleen is normal in size and CT density.    Pancreas:   There is severe diffuse fatty pancreatic atrophy      Kidneys: Kidneys are normal in size. There are no stones or hydronephrosis.      Adrenals: Adrenal glands are unremarkable.      Retroperitoneal/Lymph Nodes/Vasculature: No retroperitoneal adenopathy is identified by size criteria.      Gastrointestinal/Mesentery: The bowel loops are non-dilated without definite wall thickening or mass. The appendix is not clearly seen. No evidence of obstruction. No free air.      Bladder: The bladder is decompressed limiting the evaluation    Hysterectomy changes    Bony Structures: Grade 1 spondylolisthesis L3 on L4 and L4 and L5. Multilevel disc bulges and mild lumbar spondylosis        Impression:      Impression:  No acute CT abnormalities in the abdomen or pelvis            Electronically Signed: Jake Song MD    9/5/2023 4:37 PM EDT    Workstation ID: [id number]              Assessment & Plan        This is a 83 y.o. female with:    Active and Resolved Problems  Active Hospital Problems    Diagnosis  POA    **Diarrhea with dehydration [R19.7]  Yes     Presence of cardiac pacemaker [Z95.0]  Yes    Chronic systolic CHF (congestive heart failure) [I50.22]  Yes    Generalized anxiety disorder [F41.1]  Yes    Essential (primary) hypertension [I10]  Yes    Gastro-esophageal reflux disease without esophagitis [K21.9]  Yes    Coronary artery disease involving native coronary artery of native heart without angina pectoris [I25.10]  Yes    Primary fibromyalgia syndrome [M79.7]  Yes      Resolved Hospital Problems   No resolved problems to display.       #Diarrhea with dehydration  Given the cyclical nature of this patient's diarrhea without any infectious or dietary flags, she has a medical history that would indicate multiple risk factors for irritable bowel syndrome.  This is a diagnosis of exclusion though so a work up to rule out inflammatory, infectious, or dietary factors will be initiated.  - f/u stool studies: occult, osm, ph, electrolytes, calprotectin  - f/u blood studies for malabsorption: b12, folate, celiac ab  - Also ruling out thyroid disease with TSH  - GI consult, to see if they would like to make any further recommendations    #Pacemaker  #HTN  #CHF  After fluids the patient looks to be HDS.  - Continue home medications    #Anxiety  Stable.  - Continue home medications    #GERD  Stable.  - PPI      DVT prophylaxis:  Mechanical DVT prophylaxis orders are present.    The patient desires to be as follows:    CODE STATUS:    Level Of Support Discussed With: Patient  Code Status (Patient has no pulse and is not breathing): CPR (Attempt to Resuscitate)  Medical Interventions (Patient has pulse or is breathing): Full Support      She designates Jordan Garcia who can be contacted at [phone] to make medical decisions on her behalf if She is incapable of doing so. Patient declared said statement while fully alert and oriented on 9/5/2023 during the course of this H&P.    Admission Status:  I believe this patient meets Observation status.    Expected Length of  Stay: 2 days    PDMP reviewed and consistent with patient reported medications.    I discussed the patient's findings and my recommendations with patient.      Signature:       Chaim Alvarez M.D.    This document has been electronically signed by Chaim Alvarez MD on September 6, 2023 00:22 EDT   [facility], [place], [facility]

## 2023-09-07 ENCOUNTER — HOME HEALTH ADMISSION (OUTPATIENT)
Dept: HOME HEALTH SERVICES | Facility: HOME HEALTHCARE | Age: 83
End: 2023-09-07
Payer: MEDICARE

## 2023-09-07 ENCOUNTER — READMISSION MANAGEMENT (OUTPATIENT)
Dept: CALL CENTER | Facility: HOSPITAL | Age: 83
End: 2023-09-07
Payer: MEDICARE

## 2023-09-07 ENCOUNTER — APPOINTMENT (OUTPATIENT)
Dept: CARDIOLOGY | Facility: HOSPITAL | Age: 83
End: 2023-09-07
Payer: MEDICARE

## 2023-09-07 ENCOUNTER — DOCUMENTATION (OUTPATIENT)
Dept: HOME HEALTH SERVICES | Facility: HOME HEALTHCARE | Age: 83
End: 2023-09-07
Payer: MEDICARE

## 2023-09-07 ENCOUNTER — INPATIENT HOSPITAL (AMBULATORY)
Dept: URBAN - METROPOLITAN AREA HOSPITAL 84 | Facility: HOSPITAL | Age: 83
End: 2023-09-07
Payer: COMMERCIAL

## 2023-09-07 VITALS
BODY MASS INDEX: 29.15 KG/M2 | DIASTOLIC BLOOD PRESSURE: 79 MMHG | HEART RATE: 82 BPM | WEIGHT: 164.5 LBS | TEMPERATURE: 98.4 F | HEIGHT: 63 IN | SYSTOLIC BLOOD PRESSURE: 138 MMHG | OXYGEN SATURATION: 96 % | RESPIRATION RATE: 13 BRPM

## 2023-09-07 DIAGNOSIS — R10.13 EPIGASTRIC PAIN: ICD-10-CM

## 2023-09-07 PROBLEM — I50.22 CHRONIC HFREF (HEART FAILURE WITH REDUCED EJECTION FRACTION): Status: ACTIVE | Noted: 2023-09-07

## 2023-09-07 LAB
ALBUMIN SERPL-MCNC: 3.5 G/DL (ref 3.5–5.2)
ALBUMIN/GLOB SERPL: 1.5 G/DL
ALP SERPL-CCNC: 83 U/L (ref 39–117)
ALPHA1 GLOB MFR UR ELPH: 136 MG/DL (ref 90–200)
ALT SERPL W P-5'-P-CCNC: 10 U/L (ref 1–33)
ANION GAP SERPL CALCULATED.3IONS-SCNC: 9 MMOL/L (ref 5–15)
AST SERPL-CCNC: 22 U/L (ref 1–32)
BH CV ECHO MEAS - ACS: 1.84 CM
BH CV ECHO MEAS - AI P1/2T: 388 MSEC
BH CV ECHO MEAS - AO MAX PG: 4.2 MMHG
BH CV ECHO MEAS - AO MEAN PG: 2.7 MMHG
BH CV ECHO MEAS - AO ROOT DIAM: 3.2 CM
BH CV ECHO MEAS - AO V2 MAX: 103 CM/SEC
BH CV ECHO MEAS - AO V2 VTI: 20.9 CM
BH CV ECHO MEAS - AVA(I,D): 2.25 CM2
BH CV ECHO MEAS - EDV(CUBED): 47.3 ML
BH CV ECHO MEAS - EDV(MOD-SP4): 41 ML
BH CV ECHO MEAS - EF(MOD-BP): 50 %
BH CV ECHO MEAS - EF(MOD-SP4): 50.2 %
BH CV ECHO MEAS - ESV(CUBED): 16.6 ML
BH CV ECHO MEAS - ESV(MOD-SP4): 20.4 ML
BH CV ECHO MEAS - FS: 29.5 %
BH CV ECHO MEAS - IVS/LVPW: 0.91 CM
BH CV ECHO MEAS - IVSD: 0.74 CM
BH CV ECHO MEAS - LV DIASTOLIC VOL/BSA (35-75): 23.1 CM2
BH CV ECHO MEAS - LV MASS(C)D: 76.5 GRAMS
BH CV ECHO MEAS - LV MAX PG: 3.1 MMHG
BH CV ECHO MEAS - LV MEAN PG: 1.99 MMHG
BH CV ECHO MEAS - LV SYSTOLIC VOL/BSA (12-30): 11.5 CM2
BH CV ECHO MEAS - LV V1 MAX: 87.6 CM/SEC
BH CV ECHO MEAS - LV V1 VTI: 19.1 CM
BH CV ECHO MEAS - LVIDD: 3.6 CM
BH CV ECHO MEAS - LVIDS: 2.6 CM
BH CV ECHO MEAS - LVOT AREA: 2.45 CM2
BH CV ECHO MEAS - LVOT DIAM: 1.77 CM
BH CV ECHO MEAS - LVPWD: 0.82 CM
BH CV ECHO MEAS - MV A MAX VEL: 27.6 CM/SEC
BH CV ECHO MEAS - MV DEC SLOPE: 595.3 CM/SEC2
BH CV ECHO MEAS - MV DEC TIME: 0.14 MSEC
BH CV ECHO MEAS - MV E MAX VEL: 80.8 CM/SEC
BH CV ECHO MEAS - MV E/A: 2.9
BH CV ECHO MEAS - MV MAX PG: 5.7 MMHG
BH CV ECHO MEAS - MV MEAN PG: 1.99 MMHG
BH CV ECHO MEAS - MV V2 VTI: 23.1 CM
BH CV ECHO MEAS - MVA(VTI): 2.03 CM2
BH CV ECHO MEAS - PA ACC TIME: 0.08 SEC
BH CV ECHO MEAS - PA V2 MAX: 65 CM/SEC
BH CV ECHO MEAS - RAP SYSTOLE: 3 MMHG
BH CV ECHO MEAS - RV MAX PG: 1.9 MMHG
BH CV ECHO MEAS - RV V1 MAX: 68.9 CM/SEC
BH CV ECHO MEAS - RV V1 VTI: 12.5 CM
BH CV ECHO MEAS - RVDD: 4.4 CM
BH CV ECHO MEAS - RVSP: 37.4 MMHG
BH CV ECHO MEAS - SI(MOD-SP4): 11.6 ML/M2
BH CV ECHO MEAS - SV(LVOT): 46.9 ML
BH CV ECHO MEAS - SV(MOD-SP4): 20.6 ML
BH CV ECHO MEAS - TR MAX PG: 34.4 MMHG
BH CV ECHO MEAS - TR MAX VEL: 293.1 CM/SEC
BILIRUB SERPL-MCNC: 1 MG/DL (ref 0–1.2)
BUN SERPL-MCNC: 16 MG/DL (ref 8–23)
BUN/CREAT SERPL: 17.2 (ref 7–25)
CALCIUM SPEC-SCNC: 8 MG/DL (ref 8.6–10.5)
CERULOPLASMIN SERPL-MCNC: 30 MG/DL (ref 19–39)
CHLORIDE SERPL-SCNC: 104 MMOL/L (ref 98–107)
CO2 SERPL-SCNC: 26 MMOL/L (ref 22–29)
CREAT SERPL-MCNC: 0.93 MG/DL (ref 0.57–1)
EGFRCR SERPLBLD CKD-EPI 2021: 61.5 ML/MIN/1.73
GGT SERPL-CCNC: 49 U/L (ref 5–36)
GLIADIN PEPTIDE IGA SER-ACNC: 6 UNITS (ref 0–19)
GLIADIN PEPTIDE IGG SER-ACNC: 4 UNITS (ref 0–19)
GLOBULIN UR ELPH-MCNC: 2.4 GM/DL
GLUCOSE SERPL-MCNC: 96 MG/DL (ref 65–99)
IGA SERPL-MCNC: 186 MG/DL (ref 64–422)
POTASSIUM SERPL-SCNC: 4.1 MMOL/L (ref 3.5–5.2)
PROT SERPL-MCNC: 5.9 G/DL (ref 6–8.5)
SODIUM SERPL-SCNC: 139 MMOL/L (ref 136–145)
TTG IGA SER-ACNC: <2 U/ML (ref 0–3)
TTG IGG SER-ACNC: 6 U/ML (ref 0–5)

## 2023-09-07 PROCEDURE — G0378 HOSPITAL OBSERVATION PER HR: HCPCS

## 2023-09-07 PROCEDURE — 93306 TTE W/DOPPLER COMPLETE: CPT

## 2023-09-07 PROCEDURE — 93306 TTE W/DOPPLER COMPLETE: CPT | Performed by: INTERNAL MEDICINE

## 2023-09-07 PROCEDURE — 25010000002 METOCLOPRAMIDE PER 10 MG: Performed by: NURSE PRACTITIONER

## 2023-09-07 PROCEDURE — 97162 PT EVAL MOD COMPLEX 30 MIN: CPT

## 2023-09-07 PROCEDURE — 99232 SBSQ HOSP IP/OBS MODERATE 35: CPT

## 2023-09-07 PROCEDURE — 99232 SBSQ HOSP IP/OBS MODERATE 35: CPT | Mod: FS | Performed by: NURSE PRACTITIONER

## 2023-09-07 RX ORDER — METOPROLOL SUCCINATE 25 MG/1
25 TABLET, EXTENDED RELEASE ORAL EVERY 24 HOURS
Qty: 30 TABLET | Refills: 0 | Status: SHIPPED | OUTPATIENT
Start: 2023-09-07 | End: 2023-09-08 | Stop reason: SDUPTHER

## 2023-09-07 RX ORDER — SACUBITRIL AND VALSARTAN 24; 26 MG/1; MG/1
1 TABLET, FILM COATED ORAL 2 TIMES DAILY
Qty: 60 TABLET | Refills: 0 | Status: SHIPPED | OUTPATIENT
Start: 2023-09-07 | End: 2023-09-08 | Stop reason: SDUPTHER

## 2023-09-07 RX ORDER — ROPINIROLE 0.25 MG/1
0.25 TABLET, FILM COATED ORAL NIGHTLY
Qty: 30 TABLET | Refills: 0 | Status: SHIPPED | OUTPATIENT
Start: 2023-09-07 | End: 2023-09-08 | Stop reason: SDUPTHER

## 2023-09-07 RX ADMIN — SENNOSIDES AND DOCUSATE SODIUM 2 TABLET: 50; 8.6 TABLET ORAL at 08:58

## 2023-09-07 RX ADMIN — GABAPENTIN 600 MG: 600 TABLET, FILM COATED ORAL at 08:58

## 2023-09-07 RX ADMIN — Medication 10 ML: at 08:58

## 2023-09-07 RX ADMIN — APIXABAN 5 MG: 5 TABLET, FILM COATED ORAL at 08:58

## 2023-09-07 RX ADMIN — PANTOPRAZOLE SODIUM 40 MG: 40 TABLET, DELAYED RELEASE ORAL at 08:58

## 2023-09-07 RX ADMIN — LIDOCAINE 2 PATCH: 50 PATCH TOPICAL at 08:59

## 2023-09-07 RX ADMIN — LEVOTHYROXINE SODIUM 88 MCG: 0.09 TABLET ORAL at 08:58

## 2023-09-07 RX ADMIN — HYDROCODONE BITARTRATE AND ACETAMINOPHEN 1 TABLET: 7.5; 325 TABLET ORAL at 09:02

## 2023-09-07 RX ADMIN — METOCLOPRAMIDE 10 MG: 5 INJECTION, SOLUTION INTRAMUSCULAR; INTRAVENOUS at 08:58

## 2023-09-07 NOTE — PROGRESS NOTES
Spoke to patient son and they are agreeable to [facility] services   No other agenices in the home     Demographics verified    If patient does not answer phone please call the son to set up visits    Dr Gil to follow   Nursing

## 2023-09-07 NOTE — DISCHARGE PLACEMENT REQUEST
"Catalina Carrera (83 y.o. Female)       Date of Birth   1940    Social Security Number       Address   [address] [place] [facility]    Home Phone   [phone]    MRN   [id number]       Gnosticism   None    Marital Status                               Admission Date   9/5/23    Admission Type   Emergency    Admitting Provider   Chaim Alvarez MD    Attending Provider   Heriberto Bradley MD    Department, Room/Bed   [facility] 3C MEDICAL INPATIENT, 355/1       Discharge Date       Discharge Disposition       Discharge Destination                                 Attending Provider: Heriberto Bradley MD    Allergies: Baclofen, Codeine, Ibuprofen, Methocarbamol, Naproxen, Tizanidine Hcl, Tolmetin    Isolation: None   Infection: None   Code Status: CPR    Ht: 160 cm (63\")   Wt: 74.6 kg (164 lb 8 oz)    Admission Cmt: None   Principal Problem: Diarrhea with dehydration [R19.7]                   Active Insurance as of 9/5/2023       Primary Coverage       Payor Plan Insurance Group Employer/Plan Group    MEDICARE MEDICARE A & B        Payor Plan Address Payor Plan Phone Number Payor Plan Fax Number Effective Dates    [address] [phone]  4/1/2006 - None Entered    [facility] [id number]         Subscriber Name Subscriber Birth Date Member ID       CATALINA CARRERA 1940 [id number]               Secondary Coverage       Payor Plan Insurance Group Employer/Plan Group    ANTHEM BLUE CROSS ANTHEM BLUE CROSS BLUE SHIELD PPO [id number]       Payor Plan Address Payor Plan Phone Number Payor Plan Fax Number Effective Dates    [address] [phone]  2/2/2022 - None Entered    [facility] [id number]         Subscriber Name Subscriber Birth Date Member ID       CATALINA CARRERA 1940 [id number]               Tertiary Coverage       Payor Plan Insurance Group Employer/Plan Group    [place]        Payor Plan Address Payor Plan Phone Number Payor Plan Fax " Number Effective Dates    [address]   5/1/2022 - None Entered    [place] [address]         Subscriber Name Subscriber Birth Date Member ID       RADHA CARRERA 1940 [id number]                     Emergency Contacts        (Rel.) Home Phone Work Phone Mobile Phone    MADHAV DEAN (Son) [phone] -- [phone]

## 2023-09-07 NOTE — PROGRESS NOTES
[facility] Medicine Services   Daily Progress Note    Patient Name: Catalina Moreno  : 1940  MRN: [id number]  Primary Care Physician:  Rubio Dover MD  Date of admission: 2023  Date and Time of Service:  at 8:54am      Subjective      Chief Complaint: diarrehea     Pt seen and examined    Objective      Vitals:   Temp:  [97.4 °F (36.3 °C)-98.4 °F (36.9 °C)] 98.4 °F (36.9 °C)  Heart Rate:  [] 82  Resp:  [10-16] 13  BP: (113-155)/(56-92) 138/79  Flow (L/min):  [2] 2    Physical Exam   Constitutional:       Appearance: Well-developed.   Eyes:      General: No scleral icterus.     Conjunctiva/sclera: Conjunctivae normal.      Pupils: Pupils are equal, round, and reactive to light.   HENT:      Head: Normocephalic and atraumatic.   Neck:      Vascular: No carotid bruit or JVD.   Pulmonary:      Effort: Pulmonary effort is normal.      Breath sounds: Normal breath sounds. No wheezing. No rales.   Cardiovascular:      Normal rate. Irregularly irregular rhythm.      Murmurs: There is a systolic murmur.   Pulses:     Intact distal pulses.   Abdominal:      General: Bowel sounds are normal.      Palpations: Abdomen is soft.   Musculoskeletal: Normal range of motion.      Cervical back: Normal range of motion and neck supple. Skin:     General: Skin is warm and dry.      Findings: No rash.   Neurological:      Mental Status: Alert.      Comments: No focal deficits     Result Review    Result Review:  I have personally reviewed the results from the time of this admission to 2023 08:54 EDT and agree with these findings:  [x]  Laboratory  [x]  Microbiology  [x]  Radiology  [x]  EKG/Telemetry   []  Cardiology/Vascular   []  Pathology  []  Old records  []  Other:          Assessment & Plan      Brief Patient Summary:  Catalina Moreno is a 83 y.o. female who     apixaban, 5 mg, Oral, Q12H  atorvastatin, 10 mg, Oral, Nightly  digoxin, 125 mcg, Oral, Daily  gabapentin, 600 mg, Oral,  4x Daily  levothyroxine, 88 mcg, Oral, Daily  lidocaine, 2 patch, Transdermal, Q24H  metoclopramide, 10 mg, Intravenous, BID  metoprolol succinate XL, 25 mg, Oral, Q24H  pantoprazole, 40 mg, Oral, BID AC  rOPINIRole, 0.25 mg, Oral, Nightly  senna-docusate sodium, 2 tablet, Oral, BID  sertraline, 50 mg, Oral, Nightly  sodium chloride, 10 mL, Intravenous, Q12H       lactated ringers, 100 mL/hr, Last Rate: 100 mL/hr (09/06/23 1248)         Active Hospital Problems:  Active Hospital Problems    Diagnosis     **Diarrhea with dehydration     Presence of cardiac pacemaker     Chronic systolic CHF (congestive heart failure)     Generalized anxiety disorder     Essential (primary) hypertension     Gastro-esophageal reflux disease without esophagitis     Coronary artery disease involving native coronary artery of native heart without angina pectoris     Primary fibromyalgia syndrome      Plan:   Afibb rvr:  -Cardio on board  -restart Eliquis and digoxin but also placed on low-dose beta-blockers because of LV dysfunction     Diarrhea with dehydration  Given the cyclical nature of this patient's diarrhea without any infectious or dietary flags, she has a medical history that would indicate multiple risk factors for irritable bowel syndrome.  This is a diagnosis of exclusion though so a work up to rule out inflammatory, infectious, or dietary factors will be initiated.  - f/u stool studies: occult, osm, ph, electrolytes, calprotectin  - f/u blood studies for malabsorption: b12, folate, celiac ab  - Also ruling out thyroid disease with TSH  - GI consult, to see if they would like to make any further recommendations     Pacemaker  HTN  CHF  After fluids the patient looks to be HDS.  - Continue home medications     Anxiety  Stable.  - Continue home medications     GERD  Stable.  - PPI     DVT prophylaxis:  Medical and mechanical DVT prophylaxis orders are present.    CODE STATUS:    Level Of Support Discussed With: Patient  Code  Status (Patient has no pulse and is not breathing): CPR (Attempt to Resuscitate)  Medical Interventions (Patient has pulse or is breathing): Full Support      Electronically signed by Heriberto Bradley MD, 09/07/23, 08:54 EDT.  [facility], [place], operated by [facility]

## 2023-09-07 NOTE — PROGRESS NOTES
LOS: 0 days   Patient Care Team:  Rubio Dover MD as PCP - General (Family Medicine)  Flash Gonzalez MD as Consulting Physician (Cardiology)      Subjective     Interval History:     Subjective: Patient reports that she is feeling much better today.  Abdominal pain has significantly improved.  No further nausea/vomiting and tolerating diet without difficulty.  Denies any diarrhea since hospital admission.      ROS:   No chest pain, shortness of breath, or cough.        Medication Review:     Current Facility-Administered Medications:     acetaminophen (TYLENOL) tablet 650 mg, 650 mg, Oral, Q4H PRN, Chaim Alvarez MD, 650 mg at 09/06/23 0428    apixaban (ELIQUIS) tablet 5 mg, 5 mg, Oral, Q12H, Flash Gonzalez MD, 5 mg at 09/07/23 0858    atorvastatin (LIPITOR) tablet 10 mg, 10 mg, Oral, Nightly, Chaim Alvarez MD, 10 mg at 09/06/23 2040    sennosides-docusate (PERICOLACE) 8.6-50 MG per tablet 2 tablet, 2 tablet, Oral, BID, 2 tablet at 09/07/23 0858 **AND** polyethylene glycol (MIRALAX) packet 17 g, 17 g, Oral, Daily PRN **AND** bisacodyl (DULCOLAX) EC tablet 5 mg, 5 mg, Oral, Daily PRN **AND** bisacodyl (DULCOLAX) suppository 10 mg, 10 mg, Rectal, Daily PRN, Chaim Alvarez MD    Calcium Replacement - Follow Nurse / BPA Driven Protocol, , Does not apply, PRN, Chaim Alvarez MD    digoxin (LANOXIN) tablet 125 mcg, 125 mcg, Oral, Daily, Chaim Alvarez MD, 125 mcg at 09/06/23 1237    gabapentin (NEURONTIN) tablet 600 mg, 600 mg, Oral, 4x Daily, Chaim Alvarez MD, 600 mg at 09/07/23 0858    HYDROcodone-acetaminophen (NORCO) 7.5-325 MG per tablet 1 tablet, 1 tablet, Oral, 4x Daily PRN, Chaim Alvarez MD, 1 tablet at 09/07/23 0902    lactated ringers infusion, 100 mL/hr, Intravenous, Continuous, Chaim Alvarez MD, Last Rate: 100 mL/hr at 09/06/23 1248, 100 mL/hr at 09/06/23 1248    levothyroxine (SYNTHROID, LEVOTHROID) tablet 88 mcg, 88 mcg, Oral, Daily, Chaim Alvarez MD, 88 mcg at 09/07/23 0858    lidocaine (LIDODERM) 5 % 2 patch, 2  "patch, Transdermal, Q24H, Chaim Alvarez MD, 2 patch at 09/07/23 0859    Magnesium Standard Dose Replacement - Follow Nurse / BPA Driven Protocol, , Does not apply, Kim EATON Stuart, MD    metoclopramide (REGLAN) injection 10 mg, 10 mg, Intravenous, BID, Jessica Felix, APRN, 10 mg at 09/07/23 0858    metoprolol succinate XL (TOPROL-XL) 24 hr tablet 25 mg, 25 mg, Oral, Q24H, Flash Gonzalez MD, 25 mg at 09/06/23 1728    ondansetron (ZOFRAN) tablet 4 mg, 4 mg, Oral, Q6H PRN **OR** ondansetron (ZOFRAN) injection 4 mg, 4 mg, Intravenous, Q6H PRN, Chaim Alvarez MD, 4 mg at 09/06/23 0158    pantoprazole (PROTONIX) EC tablet 40 mg, 40 mg, Oral, BID AC, Jessica Felix, APRN, 40 mg at 09/07/23 0858    Phosphorus Replacement - Follow Nurse / BPA Driven Protocol, , Does not apply, Kim EATON Stuart, MD    Potassium Replacement - Follow Nurse / BPA Driven Protocol, , Does not apply, Kim EATON Stuart, MD    rOPINIRole (REQUIP) tablet 0.25 mg, 0.25 mg, Oral, Nightly, Chaim Alvarez MD, 0.25 mg at 09/06/23 2040    sertraline (ZOLOFT) tablet 50 mg, 50 mg, Oral, Nightly, Chaim Alvarez MD, 50 mg at 09/06/23 2040    sodium chloride 0.9 % flush 10 mL, 10 mL, Intravenous, Q12H, Chaim Alvarez MD, 10 mL at 09/07/23 0858    sodium chloride 0.9 % flush 10 mL, 10 mL, Intravenous, PRN, Chaim Alvarez MD    sodium chloride 0.9 % infusion 40 mL, 40 mL, Intravenous, PRN, Chaim Alvarez MD    temazepam (RESTORIL) capsule 15 mg, 15 mg, Oral, Nightly PRN, Chaim Alvarez MD      Objective     Vital Signs  Vitals:    09/06/23 1815 09/06/23 2332 09/07/23 0353 09/07/23 0807   BP: 147/67 120/56 113/56 138/79   BP Location: Right arm Right arm Left arm Left arm   Patient Position: Lying Lying Lying Lying   Pulse: 87 92 75 82   Resp: 14 13 10 13   Temp: 98.1 °F (36.7 °C) 98.4 °F (36.9 °C) 97.4 °F (36.3 °C) 98.4 °F (36.9 °C)   TempSrc: Oral Oral Oral Oral   SpO2: 94% 95% 94% 96%   Weight: 73.9 kg (162 lb 14.7 oz)  74.6 kg (164 lb 8 oz)    Height: 160 cm (63\")          Physical " Exam:     General Appearance:    Awake and alert, in no acute distress   Head:    Normocephalic, without obvious abnormality   Eyes:          Conjunctivae normal, anicteric sclera   Throat:   No oral lesions, no thrush, oral mucosa moist   Neck:   No adenopathy, supple, no JVD   Lungs:     respirations regular, even and unlabored   Abdomen:     Soft, non-tender, no rebound or guarding, non-distended   Rectal:     Deferred   Extremities:   No edema, no cyanosis   Skin:   No bruising or rash, no jaundice        Results Review:    CBC    Results from last 7 days   Lab Units 09/06/23 2321 09/06/23  0436 09/05/23  1532   WBC 10*3/mm3 5.70 7.30 7.30   HEMOGLOBIN g/dL 9.9* 10.5* 12.1   PLATELETS 10*3/mm3 198 212 258     CMP   Results from last 7 days   Lab Units 09/06/23 2321 09/06/23  1842 09/06/23 0436 09/05/23  1532   SODIUM mmol/L 139  --  139 140   POTASSIUM mmol/L 4.1  --  4.2 3.9   CHLORIDE mmol/L 104  --  106 106   CO2 mmol/L 26.0  --  24.0 22.0   BUN mg/dL 16  --  18 15   CREATININE mg/dL 0.93  --  0.83 0.66   GLUCOSE mg/dL 96  --  101* 102*   ALBUMIN g/dL 3.5  --  3.8 4.2   BILIRUBIN mg/dL 1.0 1.4* 1.6* 1.6*   ALK PHOS U/L 83  --  95 82   AST (SGOT) U/L 22  --  54* 17   ALT (SGPT) U/L 10  --  17 6   MAGNESIUM mg/dL  --   --   --  2.0   LIPASE U/L  --   --   --  11*     Cr Clearance Estimated Creatinine Clearance: 45.1 mL/min (by C-G formula based on SCr of 0.93 mg/dL).  Coag     HbA1C   Lab Results   Component Value Date    HGBA1C 5.9 (H) 01/05/2023    HGBA1C 6.0 (H) 11/13/2021    HGBA1C 5.6 06/04/2019     Blood Glucose No results found for: POCGLU  Infection     UA    Results from last 7 days   Lab Units 09/05/23  1550   NITRITE UA  Negative     Radiology(recent) CT Abdomen Pelvis Without Contrast    Result Date: 9/5/2023  Impression: No acute CT abnormalities in the abdomen or pelvis Electronically Signed: Jake Song MD  9/5/2023 4:37 PM EDT  Workstation ID: [id number]        Assessment & Plan      ASSESSMENT:  -Upper abdominal pain  -Nausea  -Dyspepsia  -Loss of appetite  -Diarrhea  -Elevated LFTs  -Mild normocytic anemia  -A-fib on Eliquis  -SSS s/p pacemaker  -Hypertension  -Hyperlipidemia  -CHF  -History of cholecystectomy     PLAN:  Patient is an 83-year-old female with history of SSS s/p pacemaker placement and A-fib on Eliquis who presented on 9/5 with complaints of fatigue and diarrhea.  CT on admission did not show any acute abnormality.    Patient reports that she is feeling much better today.  Abdominal pain has significantly improved.  No further nausea/vomiting.  No diarrhea since admission.  Continue PPI twice daily and low-dose Reglan.  Could consider outpatient EGD for return in symptoms.  LFTs have normalized.  Liver serologies pending.  Can follow-up as an outpatient.   Okay for discharge home from GI standpoint.  Patient can follow-up in our office in 4 to 6 weeks after discharge.  GI will be available as inpatient as needed.    Electronically signed by KALYANI Dowell, 09/07/23, 9:59 AM EDT.

## 2023-09-07 NOTE — DISCHARGE SUMMARY
[facility] Medicine Services  Discharge Summary    Date of Service: 23  Patient Name: Catalina Moreno  : 1940  MRN: [id number]    Date of Admission: 2023  Discharge Diagnosis:    Diagnosis Plan   1. Atrial fibrillation with rapid ventricular response        2. Acute gastritis without hemorrhage, unspecified gastritis type        3. Dehydration        4. Chronic pain syndrome            Date of Discharge:  23  Primary Care Physician: Rubio Dover MD      Presenting Problem:   Dehydration [E86.0]  Atrial fibrillation with rapid ventricular response [I48.91]  Diarrhea with dehydration [R19.7]  Acute gastritis without hemorrhage, unspecified gastritis type [K29.00]  Chronic HFrEF (heart failure with reduced ejection fraction) [I50.22]    Active and Resolved Hospital Problems:  Active Hospital Problems    Diagnosis POA    **Diarrhea with dehydration [R19.7] Yes    Chronic HFrEF (heart failure with reduced ejection fraction) [I50.22] Yes    Presence of cardiac pacemaker [Z95.0] Yes    Chronic systolic CHF (congestive heart failure) [I50.22] Yes    Generalized anxiety disorder [F41.1] Yes    Essential (primary) hypertension [I10] Yes    Gastro-esophageal reflux disease without esophagitis [K21.9] Yes    Coronary artery disease involving native coronary artery of native heart without angina pectoris [I25.10] Yes    Primary fibromyalgia syndrome [M79.7] Yes      Resolved Hospital Problems   No resolved problems to display.         Hospital Course     Hospital Course:  Catalina Moreno is a 83 y.o. female with history of atrial fibrillation sick sinus syndrome with pacemaker placement hypertension hyperlipidemia coronary artery disease congestive heart failure with systolic dysfunction presented to the hospital complains of deric fatigue and tiredness. No complaint of any chest pain. No PND orthopnea. No palpitation dizziness syncope or swelling of the feet. She also had  diarrhea. When she presented to the ER she had atrial fibrillation with rapid response and hence was admitted to the hospital started on Cardizem drip. She says that she is taking all her medicines regularly.   She has been having intermittent diarrhea as well as epigastric discomfort which is not relieved by Pepto-Bismol or Imodium. She reports having nausea but denies vomitin     Patient reports that she is feeling much better today. Abdominal pain has significantly improved. No further nausea/vomiting and tolerating diet without difficulty. Denies any diarrhea since hospital admission.  No diarrhea since admission.  Continue PPI twice daily and low-dose Reglan.  Could consider outpatient EGD for return in symptoms.  LFTs have normalized.  Liver serologies pending.  Can follow-up as an outpatient.   Okay for discharge home from GI standpoint.  Patient can follow-up in our office in 4 to 6 weeks after discharge.     Atrial fibrillation with rapid response  Patient presented with diarrhea and fatigue and had electrolytes are normal  Her TSH is high and is being followed  Patient is currently on Cardizem drip but I will start her on low-dose beta-blockers and stop the Cardizem drip  Patient was on sotalol and Eliquis and digoxin at home but she is not on those at this time and hence I will restart Eliquis and digoxin but also placed on low-dose beta-blockers because of LV dysfunction     Congestive heart failure  Patient is history of congestive heart failure and her EF is about 35 to 40% with moderate mitral regurgitation will have an echocardiogram performed  Patient will be started on beta-blockers and will depending on the blood pressure started on Entresto also     Coronary disease  Patient has nonobstructive disease in the past with LV dysfunction and is currently being ruled out for MI by EKG and enzymes     Hypertension  Patient blood pressure actually stable and hence I will start him on beta-blockers      Sick sinus syndrome status post pacemaker placement  Patient has a pacemaker which is working very well.        Reasons For Change In Medications and Indications for New Medications:      Day of Discharge     Vital Signs:  Temp:  [97.4 °F (36.3 °C)-98.4 °F (36.9 °C)] 98.4 °F (36.9 °C)  Heart Rate:  [] 82  Resp:  [10-16] 13  BP: (113-155)/(56-92) 138/79  Flow (L/min):  [2] 2    Physical Exam:  Physical Exam   GENERAL: The patient is well developed and nontoxic.  HEENT: Nonicteric sclerae, PERRLA, EOMI. Oropharynx clear. Moist mucous membranes. Conjunctivae appear well perfused.  CHEST: Chest wall is nontender.  HEART: Regular rate and rhythm without murmurs. No MRG  LUNGS: Clear to auscultation bilaterally. No WRR  ABDOMEN: Soft, positive bowel sounds, nontender, no organomegaly.  RECTAL: Deferred.  SKIN: No rash, no excessive bruising, petechiae, or purpura.  NEUROLOGIC: Cranial nerves II-XII intact without motor/sensory deficit       Pertinent  and/or Most Recent Results     LAB RESULTS:      Lab 09/06/23 2321 09/06/23  0436 09/05/23  1532   WBC 5.70 7.30 7.30   HEMOGLOBIN 9.9* 10.5* 12.1   HEMATOCRIT 31.3* 32.9* 37.5   PLATELETS 198 212 258   NEUTROS ABS 2.80 5.10 4.70   LYMPHS ABS 2.00 1.40 1.80   MONOS ABS 0.60 0.70 0.60   EOS ABS 0.30 0.10 0.10   MCV 85.7 85.6 85.4   CRP  --   --  0.57*         Lab 09/06/23  2321 09/06/23  0436 09/05/23  1532   SODIUM 139 139 140   POTASSIUM 4.1 4.2 3.9   CHLORIDE 104 106 106   CO2 26.0 24.0 22.0   ANION GAP 9.0 9.0 12.0   BUN 16 18 15   CREATININE 0.93 0.83 0.66   EGFR 61.5 70.5 87.7   GLUCOSE 96 101* 102*   CALCIUM 8.0* 8.7 9.2   MAGNESIUM  --   --  2.0   TSH  --  5.460*  --          Lab 09/06/23  2321 09/06/23  1842 09/06/23  0436 09/05/23  1532   TOTAL PROTEIN 5.9*  --  6.5 7.5   ALBUMIN 3.5  --  3.8 4.2   GLOBULIN 2.4  --  2.7 3.3   ALT (SGPT) 10  --  17 6   AST (SGOT) 22  --  54* 17   BILIRUBIN 1.0 1.4* 1.6* 1.6*   INDIRECT BILIRUBIN  --  1.1  --   --     BILIRUBIN DIRECT  --  0.3  --   --    ALK PHOS 83  --  95 82   LIPASE  --   --   --  11*         Lab 09/05/23  1532   HSTROP T 9             Lab 09/06/23  1842 09/05/23  1857 09/05/23  1532   IRON  --   --  58   FERRITIN 29.26  --   --    FOLATE  --  9.46  --    VITAMIN B 12  --  431  --          Brief Urine Lab Results  (Last result in the past 365 days)        Color   Clarity   Blood   Leuk Est   Nitrite   Protein   CREAT   Urine HCG        09/05/23 1550 Dark Yellow  Comment: Result checked     Clear   Negative   Negative   Negative   Trace                 Microbiology Results (last 10 days)       ** No results found for the last 240 hours. **            CT Abdomen Pelvis Without Contrast    Result Date: 9/5/2023  Impression: Impression: No acute CT abnormalities in the abdomen or pelvis Electronically Signed: Jake Song MD  9/5/2023 4:37 PM EDT  Workstation ID: [id number]     Results for orders placed during the hospital encounter of 03/04/22    Duplex Venous Lower Extremity - Bilateral CAR    Interpretation Summary  · Normal bilateral lower extremity venous duplex scan.  · Nonvascular cystic mass located in the left posterior mid calf.      Results for orders placed during the hospital encounter of 03/04/22    Duplex Venous Lower Extremity - Bilateral CAR    Interpretation Summary  · Normal bilateral lower extremity venous duplex scan.  · Nonvascular cystic mass located in the left posterior mid calf.      Results for orders placed during the hospital encounter of 01/04/23    Adult Transthoracic Echo Complete With Contrast if Necessary Per Protocol    Interpretation Summary    Left ventricular ejection fraction appears to be 36 - 40%.    There is calcification of the aortic valve.    Moderate mitral valve regurgitation is present.    Estimated right ventricular systolic pressure from tricuspid regurgitation is normal (<35 mmHg).    No pericardial effusion noted      Labs Pending at Discharge:  Pending Labs        Order Current Status    JESSICA In process    Anti-Smooth Muscle Antibody Titer In process    Celiac Ab tTG DGP TIgA In process    Mitochondrial Antibodies, M2 In process            Procedures Performed           Consults:   Consults       Date and Time Order Name Status Description    9/7/2023  9:07 AM Inpatient Gastroenterology Consult      9/6/2023  1:04 PM Inpatient Gastroenterology Consult Completed     9/6/2023  1:04 PM Inpatient Cardiology Consult                Discharge Details        Discharge Medications        New Medications        Instructions Start Date   Entresto 24-26 MG tablet  Generic drug: sacubitril-valsartan   1 tablet, Oral, 2 Times Daily      metoprolol succinate XL 25 MG 24 hr tablet  Commonly known as: TOPROL-XL   25 mg, Oral, Every 24 Hours      pantoprazole 40 MG EC tablet  Commonly known as: PROTONIX   40 mg, Oral, Daily With Lunch, Afternoons             Changes to Medications        Instructions Start Date   rOPINIRole 0.25 MG tablet  Commonly known as: REQUIP  What changed: additional instructions   0.25 mg, Oral, Nightly, Take 1 hour before bedtime.             Continue These Medications        Instructions Start Date   acetaminophen 325 MG tablet  Commonly known as: TYLENOL   650 mg, Oral, Every 6 Hours PRN      apixaban 5 MG tablet tablet  Commonly known as: ELIQUIS   5 mg, Oral, Every 12 Hours Scheduled      atorvastatin 10 MG tablet  Commonly known as: LIPITOR   10 mg, Oral, Nightly      digoxin 125 MCG tablet  Commonly known as: LANOXIN   125 mcg, Oral, Daily Digoxin      gabapentin 600 MG tablet  Commonly known as: NEURONTIN   600 mg, Oral, 4 Times Daily      levothyroxine 88 MCG tablet  Commonly known as: SYNTHROID, LEVOTHROID   88 mcg, Oral, Daily      Lidocaine 4 %   1 patch, Transdermal, Every 24 Hours, Remove & Discard patch within 12 hours or as directed by MD      loperamide 2 MG tablet  Commonly known as: IMODIUM A-D   2 mg, Oral, 4 Times Daily PRN      ondansetron 4  MG tablet  Commonly known as: ZOFRAN   4 mg, Oral, Every 8 Hours PRN      sertraline 50 MG tablet  Commonly known as: ZOLOFT   50 mg, Oral, Nightly      sildenafil 20 MG tablet  Commonly known as: REVATIO   20 mg, Oral, 3 Times Daily      traZODone 50 MG tablet  Commonly known as: DESYREL   25 mg, Oral, Daily PRN             Stop These Medications      sotalol 80 MG tablet  Commonly known as: BETAPACE              Allergies   Allergen Reactions    Baclofen Rash    Codeine Nausea Only    Ibuprofen Unknown (See Comments)     Patient doesn't know----Motin    Methocarbamol Unknown (See Comments)     Patient doesn't know    Naproxen Unknown (See Comments)     Pt. Doesn't know     Tizanidine Hcl Other (See Comments)     Syncope     Tolmetin Dizziness     Pt. Doesn't know-- same as Tolectin         Discharge Disposition:   Home or Self Care    Diet:  Hospital:  Diet Order   Procedures    Diet: Cardiac Diets; Healthy Heart (2-3 Na+); Texture: Regular Texture (IDDSI 7); Fluid Consistency: Thin (IDDSI 0)         Discharge Activity:         CODE STATUS:  Code Status and Medical Interventions:   Ordered at: 09/05/23 1822     Level Of Support Discussed With:    Patient     Code Status (Patient has no pulse and is not breathing):    CPR (Attempt to Resuscitate)     Medical Interventions (Patient has pulse or is breathing):    Full Support         No future appointments.    Additional Instructions for the Follow-ups that You Need to Schedule       Discharge Follow-up with PCP   As directed       Currently Documented PCP:    Rubio Dover MD    PCP Phone Number:    [phone]     Follow Up Details: [facility]                Time spent on Discharge including face to face service:  35 minutes      Signature: Electronically signed by Heriberto Bradley MD, 09/07/23, 10:56 EDT.  [facility] Hospitalist Team

## 2023-09-07 NOTE — PLAN OF CARE
Goal Outcome Evaluation:  Plan of Care Reviewed With: patient           Outcome Evaluation: Patient is comfortable throughout the shift. No complaints made. VS stable

## 2023-09-07 NOTE — PROGRESS NOTES
CARDIOLOGY PROGRESS NOTE:    Catalina Moreno  83 y.o.  female  1940  [id number]      Referring Provider: Dr. Alvarez    Reason for follow-up: A-fib with RVR     Patient Care Team:  Rubio Dover MD as PCP - General (Family Medicine)  Flash Gonzalez MD as Consulting Physician (Cardiology)    Subjective  Patient seen and examined.  Labs and chart reviewed.  Patient states she is doing well today but feels very weak.  Patient denies chest pain, shortness of breath, palpitations.    Objective  Patient OOB to chair resting comfortably on room air     Review of Systems   Constitutional: Negative for chills, fever and malaise/fatigue.   Cardiovascular:  Negative for chest pain, leg swelling and palpitations.   Respiratory:  Negative for shortness of breath.    Gastrointestinal:  Negative for abdominal pain, nausea and vomiting.   Neurological:  Positive for weakness. Negative for dizziness and light-headedness.     Allergies: Baclofen, Codeine, Ibuprofen, Methocarbamol, Naproxen, Tizanidine hcl, and Tolmetin    Scheduled Meds:apixaban, 5 mg, Oral, Q12H  atorvastatin, 10 mg, Oral, Nightly  digoxin, 125 mcg, Oral, Daily  gabapentin, 600 mg, Oral, 4x Daily  levothyroxine, 88 mcg, Oral, Daily  lidocaine, 2 patch, Transdermal, Q24H  metoclopramide, 10 mg, Intravenous, BID  metoprolol succinate XL, 25 mg, Oral, Q24H  pantoprazole, 40 mg, Oral, BID AC  rOPINIRole, 0.25 mg, Oral, Nightly  senna-docusate sodium, 2 tablet, Oral, BID  sertraline, 50 mg, Oral, Nightly  sodium chloride, 10 mL, Intravenous, Q12H      Continuous Infusions:lactated ringers, 100 mL/hr, Last Rate: 100 mL/hr (09/06/23 1248)      PRN Meds:.  acetaminophen    senna-docusate sodium **AND** polyethylene glycol **AND** bisacodyl **AND** bisacodyl    Calcium Replacement - Follow Nurse / BPA Driven Protocol    HYDROcodone-acetaminophen    Magnesium Standard Dose Replacement - Follow Nurse / BPA Driven Protocol    ondansetron **OR** ondansetron     "Phosphorus Replacement - Follow Nurse / BPA Driven Protocol    Potassium Replacement - Follow Nurse / BPA Driven Protocol    sodium chloride    sodium chloride    temazepam        VITAL SIGNS  Vitals:    09/06/23 1815 09/06/23 2332 09/07/23 0353 09/07/23 0807   BP: 147/67 120/56 113/56 138/79   BP Location: Right arm Right arm Left arm Left arm   Patient Position: Lying Lying Lying Lying   Pulse: 87 92 75 82   Resp: 14 13 10 13   Temp: 98.1 °F (36.7 °C) 98.4 °F (36.9 °C) 97.4 °F (36.3 °C) 98.4 °F (36.9 °C)   TempSrc: Oral Oral Oral Oral   SpO2: 94% 95% 94% 96%   Weight: 73.9 kg (162 lb 14.7 oz)  74.6 kg (164 lb 8 oz)    Height: 160 cm (63\")          Flowsheet Rows      Flowsheet Row First Filed Value   Admission Height 160 cm (63\") Documented at 09/05/2023 1503   Admission Weight 69.9 kg (154 lb) Documented at 09/05/2023 1503             TELEMETRY: Atrial fibrillation with controlled ventricular rate    Physical Exam:  Vitals reviewed.   Constitutional:       Appearance: Not in distress.   Eyes:      Pupils: Pupils are equal, round, and reactive to light.   HENT:      Nose: Nose normal.    Mouth/Throat:      Pharynx: Oropharynx is clear.   Pulmonary:      Effort: Pulmonary effort is normal.      Breath sounds: Normal breath sounds.   Cardiovascular:      Normal rate. Irregularly irregular rhythm.   Pulses:     Intact distal pulses.   Edema:     Peripheral edema absent.   Abdominal:      General: Bowel sounds are normal.   Musculoskeletal: Normal range of motion.      Cervical back: Normal range of motion and neck supple. Skin:     General: Skin is warm.   Neurological:      General: No focal deficit present.      Mental Status: Alert.        Results Review:   I reviewed the patient's new clinical results.  Lab Results (last 24 hours)       Procedure Component Value Units Date/Time    Alpha - 1 - Antitrypsin [[id number]]  (Normal) Collected: 09/06/23 1842    Specimen: Blood Updated: 09/07/23 0107     ALPHA -1 " ANTITRYPSIN 136 mg/dL     Ceruloplasmin [[id number]]  (Normal) Collected: 09/06/23 1842    Specimen: Blood Updated: 09/07/23 0107     Ceruloplasmin 30 mg/dL     Gamma GT [[id number]]  (Abnormal) Collected: 09/06/23 1842    Specimen: Blood Updated: 09/07/23 0107     GGT 49 U/L     Comprehensive Metabolic Panel [[id number]]  (Abnormal) Collected: 09/06/23 2321    Specimen: Blood Updated: 09/07/23 0023     Glucose 96 mg/dL      BUN 16 mg/dL      Creatinine 0.93 mg/dL      Sodium 139 mmol/L      Potassium 4.1 mmol/L      Chloride 104 mmol/L      CO2 26.0 mmol/L      Calcium 8.0 mg/dL      Total Protein 5.9 g/dL      Albumin 3.5 g/dL      ALT (SGPT) 10 U/L      AST (SGOT) 22 U/L      Alkaline Phosphatase 83 U/L      Total Bilirubin 1.0 mg/dL      Globulin 2.4 gm/dL      A/G Ratio 1.5 g/dL      BUN/Creatinine Ratio 17.2     Anion Gap 9.0 mmol/L      eGFR 61.5 mL/min/1.73     Narrative:      GFR Normal >60  Chronic Kidney Disease <60  Kidney Failure <15    The GFR formula is only valid for adults with stable renal function between ages 18 and 70.    CBC & Differential [[id number]]  (Abnormal) Collected: 09/06/23 2321    Specimen: Blood Updated: 09/06/23 2350    Narrative:      The following orders were created for panel order CBC & Differential.  Procedure                               Abnormality         Status                     ---------                               -----------         ------                     CBC Auto Differential[[id number]]        Abnormal            Final result                 Please view results for these tests on the individual orders.    CBC Auto Differential [[id number]]  (Abnormal) Collected: 09/06/23 2321    Specimen: Blood Updated: 09/06/23 2350     WBC 5.70 10*3/mm3      RBC 3.65 10*6/mm3      Hemoglobin 9.9 g/dL      Hematocrit 31.3 %      MCV 85.7 fL      MCH 27.2 pg      MCHC 31.8 g/dL      RDW 17.3 %      RDW-SD 55.1 fl      MPV 8.4 fL      Platelets 198 10*3/mm3      Neutrophil % 48.4 %       Lymphocyte % 35.4 %      Monocyte % 9.7 %      Eosinophil % 5.6 %      Basophil % 0.9 %      Neutrophils, Absolute 2.80 10*3/mm3      Lymphocytes, Absolute 2.00 10*3/mm3      Monocytes, Absolute 0.60 10*3/mm3      Eosinophils, Absolute 0.30 10*3/mm3      Basophils, Absolute 0.00 10*3/mm3      nRBC 0.1 /100 WBC     Hepatitis Panel, Acute [[id number]]  (Normal) Collected: 09/06/23 1842    Specimen: Blood Updated: 09/06/23 1948     Hepatitis B Surface Ag Non-Reactive     Hep A IgM Non-Reactive     Hep B C IgM Non-Reactive     Hepatitis C Ab Non-Reactive    Narrative:      Results may be falsely decreased if patient taking Biotin.     Ferritin [[id number]]  (Normal) Collected: 09/06/23 1842    Specimen: Blood Updated: 09/06/23 1944     Ferritin 29.26 ng/mL     Narrative:      Results may be falsely decreased if patient taking Biotin.      Bilirubin, Total & Direct [[id number]]  (Abnormal) Collected: 09/06/23 1842    Specimen: Blood Updated: 09/06/23 1938     Total Bilirubin 1.4 mg/dL      Bilirubin, Direct 0.3 mg/dL      Bilirubin, Indirect 1.1 mg/dL     JESSICA [[id number]] Collected: 09/06/23 1842    Specimen: Blood Updated: 09/06/23 1910    Anti-Smooth Muscle Antibody Titer [[id number]] Collected: 09/06/23 1842    Specimen: Blood Updated: 09/06/23 1910    Mitochondrial Antibodies, M2 [[id number]] Collected: 09/06/23 1842    Specimen: Blood Updated: 09/06/23 1910            Imaging Results (Last 24 Hours)       ** No results found for the last 24 hours. **            EKG      I personally viewed and interpreted the patient's EKG/Telemetry data:    ECHOCARDIOGRAM:  Results for orders placed during the hospital encounter of 01/04/23    Adult Transthoracic Echo Complete With Contrast if Necessary Per Protocol    Interpretation Summary    Left ventricular ejection fraction appears to be 36 - 40%.    There is calcification of the aortic valve.    Moderate mitral valve regurgitation is present.    Estimated right ventricular  systolic pressure from tricuspid regurgitation is normal (<35 mmHg).    No pericardial effusion noted       STRESS MYOVIEW:  Results for orders placed during the hospital encounter of 03/31/21    Stress Test With Myocardial Perfusion One Day    Interpretation Summary  · Findings consistent with a normal ECG stress test.  · Left ventricular ejection fraction is hyperdynamic (Calculated EF > 70%). .  · Impressions are consistent with a study indicating uncertain risk.  · Large apical defect with some reperfusion during the rest images cannot rule out ischemia EF 76% Clinical correlation is recommended.    Electronically signed by KALYANI Tay, 04/01/21, 11:25 AM EDT.       CARDIAC CATHETERIZATION:  Results for orders placed during the hospital encounter of 03/31/21    Cardiac Catheterization/Vascular Study    Narrative  CARDIAC CATHETERIZATION REPORT    DATE OF PROCEDURE: 4/2/2021      INDICATION FOR PROCEDURE: Abnormal stress test    PROCEDURE PERFORMED: Left heart catheterization, coronary angiography,    PROCEDURE COMMENTS:    All the risks and benefits explained with the patient informed consent was obtained from the patient.  Patient was brought to the cardiac catheterization laboratory placed on the table draped and prepped in the usual sterile fashion.  2% lidocaine was used to anesthetize the right groin area.  Using the modified Seldinger technique 5 Lithuanian sheath was inserted into the right femoral artery.    5 Lithuanian JL4 catheter was used to perform the selective left coronary angiography    5 Lithuanian JR4 catheter was used to perform the selective right coronary angiography    Angio-Seal was placed after exchanging five Lithuanian sheath with six Lithuanian sheath    Patient tolerated procedure well without any immediate complications      FINDINGS:    1. HEMODYNAMICS:  LV end-diastolic pressure 7 mmHg, /80 mmHg.    2. LEFT VENTRICULOGRAPHY: Not done patient had echocardiogram    3. CORONARY  ANGIOGRAPHY:  Dominance right  Left Main: Large-caliber vessel bifurcates into LAD circumflex artery 0% stenosis  LAD: Large-caliber vessel gives rise to couple of medium caliber diagonal arteries multiple septal branches reaches the apex mild luminal irregularities noted less than 10% stenosis  CIRC: Large-caliber vessel gives rise to marginal lateral branches less than 10% stenosis  RCA: Large-caliber dominant artery gives rise to PDA and PL branches less than 5 to 10% stenosis    SUMMARY: No obstructive coronary artery disease    RECOMMENDATIONS: Evaluate for noncardiac causes of symptoms aggressive cardiac risk factor modification       OTHER:         Assessment & Plan     Principal Problem:    Diarrhea with dehydration  Active Problems:    Primary fibromyalgia syndrome    Generalized anxiety disorder    Essential (primary) hypertension    Gastro-esophageal reflux disease without esophagitis    Chronic systolic CHF (congestive heart failure)    Coronary artery disease involving native coronary artery of native heart without angina pectoris    Presence of cardiac pacemaker    Chronic HFrEF (heart failure with reduced ejection fraction)       Atrial fibrillation with rapid response  Patient presented with diarrhea and fatigue   Electrolytes within normal limits  TSH elevated  Patient treated with IV Cardizem drip  Rates improved following cessation of drip and initiation of oral beta-blocker and digoxin  Patient previously on sotalol at home but will change to metoprolol succinate due to LV dysfunction  Patient is on Eliquis for anticoagulation  Education provided on the importance of medication compliance to prevent stroke with history of A-fib     Chronic systolic congestive heart failure  HFrEF  Most recent echocardiogram from January 2023 shows reduced LVEF of 36 to 40% with moderate mitral valve regurgitation noted  Echocardiogram this admission pending   Patient is currently on metoprolol succinate  Will  initiate SGLT2i and ARNI following results of repeat echocardiogram this admission if needed     Coronary disease  Cardiac catheterization from 2021 shows nonobstructive disease  HS troponin negative  Patient is currently on beta-blocker and statin therapy  Patient is not on aspirin as she is anticoagulated with Eliquis     Hypertension  Patient's blood pressure stable  She is currently on metoprolol succinate     Sick sinus syndrome status post pacemaker placement  Patient s/p dual-chamber pacemaker implantation January 2023  Pacemaker is working well    Patient is okay to discharge from cardiology standpoint  Patient is to follow with Dr. Fountain in [place] office     I discussed the patients findings and my recommendations with patient and nurse    Demetrice Gupta, KALYANI  09/07/23  12:03 EDT

## 2023-09-07 NOTE — PLAN OF CARE
Goal Outcome Evaluation:   Plan is for patient to discharge home via [facility].

## 2023-09-07 NOTE — THERAPY EVALUATION
Patient Name: Catalina Moreno  : 1940    MRN: [id number]                              Today's Date: 2023       Admit Date: 2023    Visit Dx:     ICD-10-CM ICD-9-CM   1. Atrial fibrillation with rapid ventricular response  I48.91 427.31   2. Acute gastritis without hemorrhage, unspecified gastritis type  K29.00 535.00   3. Dehydration  E86.0 276.51   4. Chronic pain syndrome  G89.4 338.4     Patient Active Problem List   Diagnosis    Arthritis    Other long term (current) drug therapy    Polyarthralgia    Sacroiliitis    Depressive disorder    Primary fibromyalgia syndrome    Lightheadedness    Hypothyroidism    Lumbar radiculopathy    Nonrheumatic tricuspid valve regurgitation    Atrial fibrillation with RVR    Acquired spondylolisthesis of lumbosacral region    Vitamin D deficiency    Stage 3a chronic kidney disease    DDD (degenerative disc disease), cervical    Chronic low back pain    Cervical stenosis of spinal canal    Anemia    Polypharmacy    Age-related cognitive decline    Edema, unspecified    Generalized anxiety disorder    History of falling    Insomnia, unspecified    Need for assistance with personal care    Other dysphagia    Polyneuropathy, unspecified    Unsteadiness on feet    Restless legs syndrome    Paroxysmal atrial fibrillation    Chronic pain disorder    Other intervertebral disc degeneration, lumbar region    Essential (primary) hypertension    Spondylolisthesis, lumbar region    Gastro-esophageal reflux disease without esophagitis    Pulmonary hypertension, unspecified    Chronic systolic CHF (congestive heart failure)    Coronary artery disease involving native coronary artery of native heart without angina pectoris    Moderate mitral regurgitation    Class 1 obesity due to excess calories with serious comorbidity and body mass index (BMI) of 30.0 to 30.9 in adult    Hypotension    Sick sinus syndrome    Presence of cardiac pacemaker    Diarrhea with dehydration     Chronic HFrEF (heart failure with reduced ejection fraction)     Past Medical History:   Diagnosis Date    Acute kidney injury 07/22/2022    Anxiety     Arthritis     Atrial fibrillation     paroxysmal    Broken shoulder     left-- due to fall 11-7-19 was at [facility]    Chronic systolic CHF (congestive heart failure) 01/05/2023    EF 36-40%    Coronary artery disease involving native coronary artery of native heart without angina pectoris 04/02/2021    nonobstructive    DDD (degenerative disc disease), lumbar     Depression     Edema     9/2020 foot    GERD (gastroesophageal reflux disease)     H/O fall     Heart failure with mid-range ejection fraction 03/05/2022    EF 45% per 2D echo    Hypertension     Hypothyroidism     Insomnia     Low back pain     Moderate mitral regurgitation 1/5/2023    Neuropathy     Pain in both feet     PONV (postoperative nausea and vomiting)     Pulmonary hypertension     Radiculopathy     Restless legs     Spondylolisthesis     Stage 3a chronic kidney disease     Urinary incontinence      Past Surgical History:   Procedure Laterality Date    BACK SURGERY  07/19/2018    PDC &  PSF L3-L5 insitu    CARDIAC CATHETERIZATION N/A 4/2/2021    Procedure: Cardiac Catheterization/Vascular Study;  Surgeon: Barrett Evans MD;  Location: [facility] CATH INVASIVE LOCATION;  Service: Cardiovascular;  Laterality: N/A;    CARDIAC ELECTROPHYSIOLOGY PROCEDURE N/A 1/17/2023    Procedure: Pacemaker DC new;  Surgeon: Baljeet Valero MD;  Location: [facility] CATH INVASIVE LOCATION;  Service: Cardiovascular;  Laterality: N/A;    CHOLECYSTECTOMY      COLONOSCOPY      HYSTERECTOMY      ROTATOR CUFF REPAIR Left       General Information       Row Name 09/07/23 6171          Physical Therapy Time and Intention    Document Type evaluation  -BR     Mode of Treatment physical therapy  -BR       Row Name 09/07/23 0101          General Information    Patient Profile Reviewed yes  -BR     Prior Level of Function  independent:;all household mobility;community mobility;gait;transfer  Pt uses a cane for mobility and she does own a rolling walker.  -BR     Barriers to Rehab none identified  -BR       Row Name 09/07/23 1347          Living Environment    People in Home alone  -BR       Row Name 09/07/23 1347          Home Main Entrance    Number of Stairs, Main Entrance one  -BR       Row Name 09/07/23 1347          Stairs Within Home, Primary    Number of Stairs, Within Home, Primary none  -BR       Row Name 09/07/23 1347          Cognition    Orientation Status (Cognition) oriented x 4  -BR       Row Name 09/07/23 1347          Safety Issues, Functional Mobility    Impairments Affecting Function (Mobility) endurance/activity tolerance;strength;balance  -BR               User Key  (r) = Recorded By, (t) = Taken By, (c) = Cosigned By      Initials Name Provider Type    Catalina Chaves PT Physical Therapist                   Mobility       Row Name 09/07/23 1349          Bed Mobility    Bed Mobility bed mobility (all) activities  -BR     All Activities, [place] (Bed Mobility) modified independence  -BR       Row Name 09/07/23 1349          Sit-Stand Transfer    Sit-Stand [place] (Transfers) standby assist  -BR     Assistive Device (Sit-Stand Transfers) walker, front-wheeled  -BR       Row Name 09/07/23 1349          Gait/Stairs (Locomotion)    [place] Level (Gait) supervision  -BR     Assistive Device (Gait) walker, front-wheeled  -BR     Distance in Feet (Gait) 200  -BR     Deviations/Abnormal Patterns (Gait) chad decreased  -BR     Bilateral Gait Deviations forward flexed posture  -BR               User Key  (r) = Recorded By, (t) = Taken By, (c) = Cosigned By      Initials Name Provider Type    Catalina Chaves PT Physical Therapist                   Obj/Interventions       Row Name 09/07/23 1349          Range of Motion Comprehensive    General Range of Motion bilateral lower extremity ROM WFL  -BR        Row Name 09/07/23 1343          Strength Comprehensive (MMT)    Comment, General Manual Muscle Testing (MMT) Assessment BLE strength grossly 3+/5  -BR       Row Name 09/07/23 1343          Balance    Balance Assessment sitting static balance;sitting dynamic balance;standing static balance;standing dynamic balance  -BR     Static Sitting Balance modified independence  -BR     Dynamic Sitting Balance modified independence  -BR     Position, Sitting Balance unsupported;sitting edge of bed  -BR     Static Standing Balance standby assist  -BR     Dynamic Standing Balance contact guard  -BR     Position/Device Used, Standing Balance walker, rolling  -BR               User Key  (r) = Recorded By, (t) = Taken By, (c) = Cosigned By      Initials Name Provider Type    BR Catalina Adams, PT Physical Therapist                   Goals/Plan       Row Name 09/07/23 1356          Bed Mobility Goal 1 (PT)    Activity/Assistive Device (Bed Mobility Goal 1, PT) bed mobility activities, all  -BR     [place] Level/Cues Needed (Bed Mobility Goal 1, PT) independent  -BR     Time Frame (Bed Mobility Goal 1, PT) long term goal (LTG);2 weeks  -BR       Row Name 09/07/23 1356          Transfer Goal 1 (PT)    Activity/Assistive Device (Transfer Goal 1, PT) transfers, all  -BR     [place] Level/Cues Needed (Transfer Goal 1, PT) modified independence  -BR     Time Frame (Transfer Goal 1, PT) long term goal (LTG);2 weeks  -BR       Row Name 09/07/23 1356          Gait Training Goal 1 (PT)    Activity/Assistive Device (Gait Training Goal 1, PT) gait (walking locomotion);assistive device use  -BR     [place] Level (Gait Training Goal 1, PT) modified independence  -BR     Distance (Gait Training Goal 1, PT) 200  -BR     Time Frame (Gait Training Goal 1, PT) long term goal (LTG);2 weeks  -BR       Row Name 09/07/23 1356          Therapy Assessment/Plan (PT)    Planned Therapy Interventions (PT) balance training;gait  training;stair training;strengthening;neuromuscular re-education;patient/family education  -BR               User Key  (r) = Recorded By, (t) = Taken By, (c) = Cosigned By      Initials Name Provider Type    BR Catalina Adams, PT Physical Therapist                   Clinical Impression       Row Name 09/07/23 1353          Pain    Pretreatment Pain Rating 0/10 - no pain  -BR     Posttreatment Pain Rating 0/10 - no pain  -BR       Row Name 09/07/23 1352          Plan of Care Review    Plan of Care Reviewed With patient  -BR     Outcome Evaluation Catalina Moreno is a 83 y.o. female with a PMH of HTN, HLD, fibromyalgia, CHF, Afib, Hypothyroidism, pacemaker for SSS, and GERD who presented to [facility] on 9/5/2023 with fatigue and diarrhea. Pt being treated for dehydration. GI following pt. Pt had cardizem drip for a-fib. CT abdomen was negative. Pt A and O x 4. At baseline, pt lives alone and uses a cane for household mobility. Her 2 sons live an hour away. She lives in [facility] with 1 step entry. Pt denies any falls. Pt does own a rolling walker. Today, pt ambulated 200 feet with SBA of 1 with rolling walker. She is near her baseline for mobility bit the rolling walker was needed to normalize gait pattern and accommodate balance deficits. PT advised pt to use her rolling walker at all times to decrease her falls risk and pt verbalized understanding. Pt asking about resources to get more assistance in her home- CM was notified. PT will follow pt with PT recommendation of Home with Home Health Physical therapy.  -BR       Row Name 09/07/23 2377          Therapy Assessment/Plan (PT)    Rehab Potential (PT) good, to achieve stated therapy goals  -BR     Criteria for Skilled Interventions Met (PT) yes;meets criteria;skilled treatment is necessary  -BR     Therapy Frequency (PT) 3 times/wk  -BR     Predicted Duration of Therapy Intervention (PT) until D/C  -BR       Row Name 09/07/23 1354          Vital Signs    O2  Delivery Pre Treatment room air  -BR     Pre Patient Position Supine  -BR     Intra Patient Position Standing  -BR     Post Patient Position Sitting  -BR       Row Name 09/07/23 1350          Positioning and Restraints    Pre-Treatment Position in bed  -BR     Post Treatment Position chair  -BR     In Chair notified nsg;sitting;call light within reach;encouraged to call for assist;exit alarm on  -BR               User Key  (r) = Recorded By, (t) = Taken By, (c) = Cosigned By      Initials Name Provider Type    Catalina Chaves PT Physical Therapist                   Outcome Measures       Row Name 09/07/23 1357 09/07/23 1042       How much help from another person do you currently need...    Turning from your back to your side while in flat bed without using bedrails? 4  -BR 4  -MM    Moving from lying on back to sitting on the side of a flat bed without bedrails? 4  -BR 4  -MM    Moving to and from a bed to a chair (including a wheelchair)? 3  -BR 4  -MM    Standing up from a chair using your arms (e.g., wheelchair, bedside chair)? 4  -BR 3  -MM    Climbing 3-5 steps with a railing? 3  -BR 3  -MM    To walk in hospital room? 3  -BR 3  -MM    AM-PAC 6 Clicks Score (PT) 21  -BR 21  -MM    Highest level of mobility 6 --> Walked 10 steps or more  -BR 6 --> Walked 10 steps or more  -MM      Row Name 09/07/23 1357          Functional Assessment    Outcome Measure Options AM-PAC 6 Clicks Basic Mobility (PT)  -BR               User Key  (r) = Recorded By, (t) = Taken By, (c) = Cosigned By      Initials Name Provider Type    Marilou Ewing, RN Registered Nurse    Catalina Chaves, DEBBI Physical Therapist                                 Physical Therapy Education       Title: PT OT SLP Therapies (Done)       Topic: Physical Therapy (Done)       Point: Mobility training (Done)       Learning Progress Summary             Patient Acceptance, E,D, VU,DU,NR by BERNARDINO at 9/7/2023 1357                         Point: Body  mechanics (Done)       Learning Progress Summary             Patient Acceptance, E,D, VU,DU,NR by BR at 9/7/2023 1357                         Point: Precautions (Done)       Learning Progress Summary             Patient Acceptance, E,D, VU,DU,NR by BR at 9/7/2023 1357                                         User Key       Initials Effective Dates Name Provider Type Discipline    BR 02/01/22 -  Catalina Adams, PT Physical Therapist PT                  PT Recommendation and Plan  Planned Therapy Interventions (PT): balance training, gait training, stair training, strengthening, neuromuscular re-education, patient/family education  Plan of Care Reviewed With: patient  Outcome Evaluation: Catalina Moreno is a 83 y.o. female with a PMH of HTN, HLD, fibromyalgia, CHF, Afib, Hypothyroidism, pacemaker for SSS, and GERD who presented to [facility] on 9/5/2023 with fatigue and diarrhea. Pt being treated for dehydration. GI following pt. Pt had cardizem drip for a-fib. CT abdomen was negative. Pt A and O x 4. At baseline, pt lives alone and uses a cane for household mobility. Her 2 sons live an hour away. She lives in [facility] with 1 step entry. Pt denies any falls. Pt does own a rolling walker. Today, pt ambulated 200 feet with SBA of 1 with rolling walker. She is near her baseline for mobility bit the rolling walker was needed to normalize gait pattern and accommodate balance deficits. PT advised pt to use her rolling walker at all times to decrease her falls risk and pt verbalized understanding. Pt asking about resources to get more assistance in her home- CM was notified. PT will follow pt with PT recommendation of Home with Home Health Physical therapy.     Time Calculation:         PT Charges       Row Name 09/07/23 6537             Time Calculation    Start Time 0938  -BR      Stop Time 1000  -BR      Time Calculation (min) 22 min  -BR      PT Received On 09/07/23  -BR      PT - Next Appointment 09/08/23  -BR       PT Goal Re-Cert Due Date 09/21/23  -BR         Time Calculation- PT    Total Timed Code Minutes- PT 0 minute(s)  -BR                User Key  (r) = Recorded By, (t) = Taken By, (c) = Cosigned By      Initials Name Provider Type    Catalina Chaves, PT Physical Therapist                  Therapy Charges for Today       Code Description Service Date Service Provider Modifiers Qty    [id number] HC PT EVAL MOD COMPLEXITY 4 9/7/2023 Catalina Adams, PT GP 1            PT G-Codes  Outcome Measure Options: AM-PAC 6 Clicks Basic Mobility (PT)  AM-PAC 6 Clicks Score (PT): 21  PT Discharge Summary  Anticipated Discharge Disposition (PT): home with home health    Catalina Adams, PT  9/7/2023

## 2023-09-07 NOTE — PLAN OF CARE
Goal Outcome Evaluation:  Plan of Care Reviewed With: patient  Catalina Moreno is a 83 y.o. female with a PMH of HTN, HLD, fibromyalgia, CHF, Afib, Hypothyroidism, pacemaker for SSS, and GERD who presented to [facility] on 9/5/2023 with fatigue and diarrhea. Pt being treated for dehydration. GI following pt. Pt had cardizem drip for a-fib. CT abdomen was negative. Pt A and O x 4. At baseline, pt lives alone and uses a cane for household mobility. Her 2 sons live an hour away. She lives in [facility] with 1 step entry. Pt denies any falls. Pt does own a rolling walker. Today, pt ambulated 200 feet with SBA of 1 with rolling walker. She is near her baseline for mobility bit the rolling walker was needed to normalize gait pattern and accommodate balance deficits. PT advised pt to use her rolling walker at all times to decrease her falls risk and pt verbalized understanding. Pt asking about resources to get more assistance in her home- CM was notified. PT will follow pt with PT recommendation of Home with Home Health Physical therapy.                Anticipated Discharge Disposition (PT): home with home health

## 2023-09-08 LAB
MITOCHONDRIA M2 IGG SER-ACNC: <20 UNITS (ref 0–20)
SMA IGG SER-ACNC: 27 UNITS (ref 0–19)

## 2023-09-08 RX ORDER — SACUBITRIL AND VALSARTAN 24; 26 MG/1; MG/1
1 TABLET, FILM COATED ORAL 2 TIMES DAILY
Qty: 60 TABLET | Refills: 0 | Status: ON HOLD | OUTPATIENT
Start: 2023-09-08

## 2023-09-08 RX ORDER — METOPROLOL SUCCINATE 25 MG/1
25 TABLET, EXTENDED RELEASE ORAL EVERY 24 HOURS
Qty: 30 TABLET | Refills: 0 | Status: ON HOLD | OUTPATIENT
Start: 2023-09-08

## 2023-09-08 RX ORDER — ROPINIROLE 0.25 MG/1
0.25 TABLET, FILM COATED ORAL NIGHTLY
Qty: 30 TABLET | Refills: 0 | Status: ON HOLD | OUTPATIENT
Start: 2023-09-08

## 2023-09-08 NOTE — CASE MANAGEMENT/SOCIAL WORK
Case Management Discharge Note      Final Note: [facility].         Selected Continued Care - Discharged on 9/7/2023 Admission date: 9/5/2023 - Discharge disposition: Home or Self Care      Home Medical Care Coordination complete.      Service Provider Selected Services Address Phone Fax Patient Preferred    [facility] [address] [facility] [place] [address] [phone] --               Transportation Services  W/C Van: Juan Erickson    Final Discharge Disposition Code: 06 - home with home health care

## 2023-09-08 NOTE — OUTREACH NOTE
Prep Survey      Flowsheet Row Responses   [facility] facility patient discharged from? [facility]   Is LACE score < 7 ? No   Eligibility Readm Mgmt   Discharge diagnosis AFIB/RVR,  gastritis   Does the patient have one of the following disease processes/diagnoses(primary or secondary)? Other   Does the patient have Home health ordered? Yes   What is the Home health agency?  [facility]   Is there a DME ordered? No   Comments regarding appointments call for apmt   Medication alerts for this patient entresto, toprol, requip, protonix   General alerts for this patient HX CHF   Prep survey completed? Yes            Kellie SCHWAB - Registered Nurse

## 2023-09-09 ENCOUNTER — HOME CARE VISIT (OUTPATIENT)
Dept: HOME HEALTH SERVICES | Facility: HOME HEALTHCARE | Age: 83
End: 2023-09-09
Payer: MEDICARE

## 2023-09-09 VITALS
TEMPERATURE: 97.8 F | OXYGEN SATURATION: 98 % | DIASTOLIC BLOOD PRESSURE: 74 MMHG | SYSTOLIC BLOOD PRESSURE: 132 MMHG | RESPIRATION RATE: 16 BRPM | HEART RATE: 73 BPM

## 2023-09-09 LAB — ANA SER QL: NEGATIVE

## 2023-09-09 PROCEDURE — G0299 HHS/HOSPICE OF RN EA 15 MIN: HCPCS

## 2023-09-09 NOTE — HOME HEALTH
"SOC Note: Patient is a pleasant 83 year old female who states that she became very weak at home and was unable to get around. States that she called 911. Patient was diagnosed with a-fib and gastritis. Patient states that she is not feeling much stronger now than she was when she went in. Patient is getting around safely with her rollator. States that she feels very weak. Patient voices a very poor appeite. Education provided on the importance of proper nutrition while trying to gain strength. Patient voices understanding. Patient does not have 11 of her prescribed meds in the home. States that her son is going to get them from the pharmacy tomorrow. SN called pharmacy and asked them to fill 4 meds. Tech states that meds should be ready by tomorrow. Patient states that if they aren't ready tomorrow, then it will be another week before she can get her meds. States that she is not driving and has no one to get them for her during the week because her son works out of town. SN advised patient to try to find a pharmacy that will deliver her meds. Patient states that she does not know where to start with this. SN will send in  on Mon and also find some info for patient. SN 1W8    Home Health ordered for: SN, PT, OT, MSW    Reason for Hosp/Primary Dx/Co-morbidities: a-fib    Focus of Care: a-fib    Patient's goal(s): \"to get my strength back so I can get around my house better\"    Current Functional status/mobility/DME: using rollator when ambulating in her home    HB status/Living Arrangements: lives alone    Skin Integrity/wound status: no open wounds    Code Status: full code    Fall Risk/Safety concerns: high    Medication issues/Concerns: Patient does not have all meds in her home. Meds are ready to be picked up at pharmacy, but son cannot get them until tomorrow    Additional Problems/Concerns: Patient is very concerned about not having her meds. States that son is only home on the weekends to  " meds. SN advised patient to use a mail pharmacy and patient states that she will look into this    SDOH Barriers (i.e. caregiver concerns, social isolation, transportation, food insecurity, environment, income etc.)/Need for MSW: yes, order  placed    Plan for next visit: CP assess, falls/safety assess, assess if patient picked up meds from pharmacy, assess for any heart palpitations, review meds in depth with patient

## 2023-09-11 ENCOUNTER — HOME CARE VISIT (OUTPATIENT)
Dept: HOME HEALTH SERVICES | Facility: HOME HEALTHCARE | Age: 83
End: 2023-09-11
Payer: MEDICARE

## 2023-09-11 PROCEDURE — G0155 HHCP-SVS OF CSW,EA 15 MIN: HCPCS

## 2023-09-11 RX ORDER — DIGOXIN 125 MCG
TABLET ORAL
Qty: 30 TABLET | Refills: 3 | Status: ON HOLD | OUTPATIENT
Start: 2023-09-11 | End: 2023-09-13

## 2023-09-11 NOTE — TELEPHONE ENCOUNTER
Yes, we'll refill it. She no showed appt in [place]. Need to reach out and attempt to reschedule so meds can be adjusted if necessary.

## 2023-09-11 NOTE — TELEPHONE ENCOUNTER
Rx Refill Note  Requested Prescriptions     Pending Prescriptions Disp Refills    digoxin (LANOXIN) 125 MCG tablet [Pharmacy Med Name: DIGOXIN 0.125MG TABLETS (YELLOW)] 30 tablet 3     Sig: TAKE 1 TABLET BY MOUTH EVERY DAY      Last office visit with prescribing clinician: Visit date not found   Last telemedicine visit with prescribing clinician: Visit date not found   Next office visit with prescribing clinician: Visit date not found                         Would you like a call back once the refill request has been completed: [] Yes [] No    If the office needs to give you a call back, can they leave a voicemail: [] Yes [] No    Grace Biswas MA  09/11/23, 09:20 EDT

## 2023-09-11 NOTE — HOME HEALTH
"Face-to-face contact with patient at her home. Patient was awake, alert, and oriented x 4. She was neat, clean, and appropriately dressed, with good grooming and hygiene. She spoke in a clear, unpressured tone of voice, with appropriate rate, content, and volume. Patient lives lives alone in her own home in [place], [place]. She reportsn that her late  built this home for them. Her late  worked for 30 years at the [employer] in [place]. He passed away from cancer \"several years ago.\" Patient reports that she has not worked since before her marriage. Pt has two adult sons, Jordan, who lives in [place], and Karri, who lives in [place]. Pt reports that Jordan is her primary caregiver and is in the process of becoming her POA. She reports that \"he is the only one that I can depend on, because the other one I can't. She speaks with her primary caregiver on a frequent basis. However, Jordan is an over-the-road  and is on the road a lot; this limits the frequency with which he can see patient. Patient suffers from heart problems (A-Fib, as well as from depression and cognititive decline. SW assisted patient with accessing community resources and futures plannig. Patient reports that she is independent with most ADL's. She is unable to drive yet. Jordan gets her groceries and meds for her. Jordan got her groceries and meds for her yesterday before he went on the road. Patient reports that she ambulates with the assistance of a cane. She also has a walker, but usually just uses her cane around the home. She denies any recent falls. She says that \"I try to be very careful about that.\" SW recommended that she carry a cell phone on her at all times for safety. Patient voiced understanding and agreed to do this. Patient reports that she does not have a very big system of friends and family. She has a friend in [place] and a sister in [place]. Other than this, she has very few friends to whom she can " "turn. She reports that her neighbors \"are young and they work,\" so she is not particularly close to them. CINTHYA referrred pattient to [facility]. CINTHYA gave pt a pamphlet describing [facility] services. SW went over the services that this agency provides and noted how patient might benefit by these services. Patient voiced understanding. CINTHYA gave patient the pamphlet with the phone number on the outside cover. SW asked patient to call the phone number and ask to be evaluated for services. She stated that she would do so. CINTHYA noted that since she has Medicaid she ought to qualify for case mgmt services with this agency."

## 2023-09-11 NOTE — TELEPHONE ENCOUNTER
Office Visit with Kale Fountain MD (02/15/2023)     It does state in LOV that Dr Anguiano will adjust medications, that was in February of 2023 and I am verifying that this medication needs to be refilled.

## 2023-09-12 ENCOUNTER — APPOINTMENT (OUTPATIENT)
Dept: GENERAL RADIOLOGY | Facility: HOSPITAL | Age: 83
DRG: 309 | End: 2023-09-12
Payer: MEDICARE

## 2023-09-12 ENCOUNTER — HOME CARE VISIT (OUTPATIENT)
Dept: HOME HEALTH SERVICES | Facility: HOME HEALTHCARE | Age: 83
End: 2023-09-12
Payer: MEDICARE

## 2023-09-12 ENCOUNTER — READMISSION MANAGEMENT (OUTPATIENT)
Dept: CALL CENTER | Facility: HOSPITAL | Age: 83
End: 2023-09-12
Payer: MEDICARE

## 2023-09-12 ENCOUNTER — HOSPITAL ENCOUNTER (INPATIENT)
Facility: HOSPITAL | Age: 83
LOS: 2 days | Discharge: HOME HEALTH | DRG: 309 | End: 2023-09-15
Attending: EMERGENCY MEDICINE | Admitting: INTERNAL MEDICINE
Payer: MEDICARE

## 2023-09-12 VITALS
TEMPERATURE: 97.8 F | DIASTOLIC BLOOD PRESSURE: 70 MMHG | OXYGEN SATURATION: 98 % | SYSTOLIC BLOOD PRESSURE: 120 MMHG | HEART RATE: 80 BPM

## 2023-09-12 DIAGNOSIS — R11.0 NAUSEA: ICD-10-CM

## 2023-09-12 DIAGNOSIS — R10.10 PAIN OF UPPER ABDOMEN: ICD-10-CM

## 2023-09-12 DIAGNOSIS — R78.89 SERUM DIGOXIN LEVEL BELOW THERAPEUTIC RANGE: ICD-10-CM

## 2023-09-12 DIAGNOSIS — I48.91 ATRIAL FIBRILLATION WITH RAPID VENTRICULAR RESPONSE: Primary | ICD-10-CM

## 2023-09-12 PROBLEM — I48.92 ATRIAL FLUTTER WITH RAPID VENTRICULAR RESPONSE: Status: ACTIVE | Noted: 2023-09-12

## 2023-09-12 LAB
ALBUMIN SERPL-MCNC: 4.2 G/DL (ref 3.5–5.2)
ALBUMIN/GLOB SERPL: 1.4 G/DL
ALP SERPL-CCNC: 97 U/L (ref 39–117)
ALT SERPL W P-5'-P-CCNC: 8 U/L (ref 1–33)
ANION GAP SERPL CALCULATED.3IONS-SCNC: 12 MMOL/L (ref 5–15)
AST SERPL-CCNC: 11 U/L (ref 1–32)
B PARAPERT DNA SPEC QL NAA+PROBE: NOT DETECTED
B PERT DNA SPEC QL NAA+PROBE: NOT DETECTED
BASOPHILS # BLD AUTO: 0.1 10*3/MM3 (ref 0–0.2)
BASOPHILS NFR BLD AUTO: 1.3 % (ref 0–1.5)
BILIRUB SERPL-MCNC: 1 MG/DL (ref 0–1.2)
BUN SERPL-MCNC: 15 MG/DL (ref 8–23)
BUN/CREAT SERPL: 20.8 (ref 7–25)
C PNEUM DNA NPH QL NAA+NON-PROBE: NOT DETECTED
CALCIUM SPEC-SCNC: 9.5 MG/DL (ref 8.6–10.5)
CHLORIDE SERPL-SCNC: 104 MMOL/L (ref 98–107)
CO2 SERPL-SCNC: 23 MMOL/L (ref 22–29)
CREAT SERPL-MCNC: 0.72 MG/DL (ref 0.57–1)
DEPRECATED RDW RBC AUTO: 51.2 FL (ref 37–54)
DIGOXIN SERPL-MCNC: <0.3 NG/ML (ref 0.6–1.2)
EGFRCR SERPLBLD CKD-EPI 2021: 83.6 ML/MIN/1.73
EOSINOPHIL # BLD AUTO: 0 10*3/MM3 (ref 0–0.4)
EOSINOPHIL NFR BLD AUTO: 0.5 % (ref 0.3–6.2)
ERYTHROCYTE [DISTWIDTH] IN BLOOD BY AUTOMATED COUNT: 17.3 % (ref 12.3–15.4)
FLUAV SUBTYP SPEC NAA+PROBE: NOT DETECTED
FLUBV RNA ISLT QL NAA+PROBE: NOT DETECTED
GEN 5 2HR TROPONIN T REFLEX: 10 NG/L
GLOBULIN UR ELPH-MCNC: 3 GM/DL
GLUCOSE SERPL-MCNC: 121 MG/DL (ref 65–99)
HADV DNA SPEC NAA+PROBE: NOT DETECTED
HCOV 229E RNA SPEC QL NAA+PROBE: NOT DETECTED
HCOV HKU1 RNA SPEC QL NAA+PROBE: NOT DETECTED
HCOV NL63 RNA SPEC QL NAA+PROBE: NOT DETECTED
HCOV OC43 RNA SPEC QL NAA+PROBE: NOT DETECTED
HCT VFR BLD AUTO: 38.6 % (ref 34–46.6)
HGB BLD-MCNC: 12.6 G/DL (ref 12–15.9)
HMPV RNA NPH QL NAA+NON-PROBE: NOT DETECTED
HOLD SPECIMEN: NORMAL
HPIV1 RNA ISLT QL NAA+PROBE: NOT DETECTED
HPIV2 RNA SPEC QL NAA+PROBE: NOT DETECTED
HPIV3 RNA NPH QL NAA+PROBE: NOT DETECTED
HPIV4 P GENE NPH QL NAA+PROBE: NOT DETECTED
LYMPHOCYTES # BLD AUTO: 1.5 10*3/MM3 (ref 0.7–3.1)
LYMPHOCYTES NFR BLD AUTO: 18.1 % (ref 19.6–45.3)
M PNEUMO IGG SER IA-ACNC: NOT DETECTED
MCH RBC QN AUTO: 27.9 PG (ref 26.6–33)
MCHC RBC AUTO-ENTMCNC: 32.6 G/DL (ref 31.5–35.7)
MCV RBC AUTO: 85.5 FL (ref 79–97)
MONOCYTES # BLD AUTO: 0.5 10*3/MM3 (ref 0.1–0.9)
MONOCYTES NFR BLD AUTO: 6.5 % (ref 5–12)
NEUTROPHILS NFR BLD AUTO: 6 10*3/MM3 (ref 1.7–7)
NEUTROPHILS NFR BLD AUTO: 73.6 % (ref 42.7–76)
NRBC BLD AUTO-RTO: 0 /100 WBC (ref 0–0.2)
NT-PROBNP SERPL-MCNC: 998.5 PG/ML (ref 0–1800)
PLATELET # BLD AUTO: 270 10*3/MM3 (ref 140–450)
PMV BLD AUTO: 8.2 FL (ref 6–12)
POTASSIUM SERPL-SCNC: 4.2 MMOL/L (ref 3.5–5.2)
PROT SERPL-MCNC: 7.2 G/DL (ref 6–8.5)
RBC # BLD AUTO: 4.52 10*6/MM3 (ref 3.77–5.28)
RHINOVIRUS RNA SPEC NAA+PROBE: NOT DETECTED
RSV RNA NPH QL NAA+NON-PROBE: NOT DETECTED
SARS-COV-2 RNA NPH QL NAA+NON-PROBE: NOT DETECTED
SODIUM SERPL-SCNC: 139 MMOL/L (ref 136–145)
TROPONIN T DELTA: 1 NG/L
TROPONIN T SERPL HS-MCNC: 9 NG/L
WBC NRBC COR # BLD: 8.1 10*3/MM3 (ref 3.4–10.8)
WHOLE BLOOD HOLD COAG: NORMAL

## 2023-09-12 PROCEDURE — 25010000002 DIGOXIN PER 500 MCG: Performed by: EMERGENCY MEDICINE

## 2023-09-12 PROCEDURE — 93005 ELECTROCARDIOGRAM TRACING: CPT | Performed by: EMERGENCY MEDICINE

## 2023-09-12 PROCEDURE — 71045 X-RAY EXAM CHEST 1 VIEW: CPT

## 2023-09-12 PROCEDURE — 99285 EMERGENCY DEPT VISIT HI MDM: CPT

## 2023-09-12 PROCEDURE — 83880 ASSAY OF NATRIURETIC PEPTIDE: CPT | Performed by: EMERGENCY MEDICINE

## 2023-09-12 PROCEDURE — 0202U NFCT DS 22 TRGT SARS-COV-2: CPT | Performed by: EMERGENCY MEDICINE

## 2023-09-12 PROCEDURE — 85025 COMPLETE CBC W/AUTO DIFF WBC: CPT | Performed by: EMERGENCY MEDICINE

## 2023-09-12 PROCEDURE — G0378 HOSPITAL OBSERVATION PER HR: HCPCS

## 2023-09-12 PROCEDURE — G0151 HHCP-SERV OF PT,EA 15 MIN: HCPCS

## 2023-09-12 PROCEDURE — 80053 COMPREHEN METABOLIC PANEL: CPT | Performed by: EMERGENCY MEDICINE

## 2023-09-12 PROCEDURE — 84484 ASSAY OF TROPONIN QUANT: CPT | Performed by: EMERGENCY MEDICINE

## 2023-09-12 PROCEDURE — 36415 COLL VENOUS BLD VENIPUNCTURE: CPT

## 2023-09-12 PROCEDURE — 25010000002 ONDANSETRON PER 1 MG

## 2023-09-12 PROCEDURE — 80162 ASSAY OF DIGOXIN TOTAL: CPT | Performed by: EMERGENCY MEDICINE

## 2023-09-12 RX ORDER — ROPINIROLE 0.25 MG/1
0.25 TABLET, FILM COATED ORAL NIGHTLY
Status: DISCONTINUED | OUTPATIENT
Start: 2023-09-12 | End: 2023-09-15 | Stop reason: HOSPADM

## 2023-09-12 RX ORDER — POLYETHYLENE GLYCOL 3350 17 G/17G
17 POWDER, FOR SOLUTION ORAL DAILY PRN
Status: DISCONTINUED | OUTPATIENT
Start: 2023-09-12 | End: 2023-09-15 | Stop reason: HOSPADM

## 2023-09-12 RX ORDER — DILTIAZEM HYDROCHLORIDE 5 MG/ML
10 INJECTION INTRAVENOUS ONCE
Status: COMPLETED | OUTPATIENT
Start: 2023-09-12 | End: 2023-09-12

## 2023-09-12 RX ORDER — BISACODYL 10 MG
10 SUPPOSITORY, RECTAL RECTAL DAILY PRN
Status: DISCONTINUED | OUTPATIENT
Start: 2023-09-12 | End: 2023-09-15 | Stop reason: HOSPADM

## 2023-09-12 RX ORDER — ACETAMINOPHEN 650 MG/1
650 SUPPOSITORY RECTAL EVERY 4 HOURS PRN
Status: DISCONTINUED | OUTPATIENT
Start: 2023-09-12 | End: 2023-09-15 | Stop reason: HOSPADM

## 2023-09-12 RX ORDER — AMOXICILLIN 250 MG
2 CAPSULE ORAL 2 TIMES DAILY
Status: DISCONTINUED | OUTPATIENT
Start: 2023-09-12 | End: 2023-09-15 | Stop reason: HOSPADM

## 2023-09-12 RX ORDER — ATORVASTATIN CALCIUM 10 MG/1
10 TABLET, FILM COATED ORAL NIGHTLY
Status: DISCONTINUED | OUTPATIENT
Start: 2023-09-12 | End: 2023-09-13

## 2023-09-12 RX ORDER — DIGOXIN 0.25 MG/ML
250 INJECTION INTRAMUSCULAR; INTRAVENOUS ONCE
Status: COMPLETED | OUTPATIENT
Start: 2023-09-12 | End: 2023-09-12

## 2023-09-12 RX ORDER — ALUMINA, MAGNESIA, AND SIMETHICONE 2400; 2400; 240 MG/30ML; MG/30ML; MG/30ML
15 SUSPENSION ORAL EVERY 6 HOURS PRN
Status: DISCONTINUED | OUTPATIENT
Start: 2023-09-12 | End: 2023-09-15 | Stop reason: HOSPADM

## 2023-09-12 RX ORDER — ACETAMINOPHEN 325 MG/1
650 TABLET ORAL EVERY 4 HOURS PRN
Status: DISCONTINUED | OUTPATIENT
Start: 2023-09-12 | End: 2023-09-15 | Stop reason: HOSPADM

## 2023-09-12 RX ORDER — HYDROCODONE BITARTRATE AND ACETAMINOPHEN 10; 325 MG/1; MG/1
1 TABLET ORAL ONCE
Status: COMPLETED | OUTPATIENT
Start: 2023-09-12 | End: 2023-09-12

## 2023-09-12 RX ORDER — LEVOTHYROXINE SODIUM 88 UG/1
88 TABLET ORAL DAILY
Status: DISCONTINUED | OUTPATIENT
Start: 2023-09-12 | End: 2023-09-13

## 2023-09-12 RX ORDER — ACETAMINOPHEN 160 MG/5ML
650 SOLUTION ORAL EVERY 4 HOURS PRN
Status: DISCONTINUED | OUTPATIENT
Start: 2023-09-12 | End: 2023-09-15 | Stop reason: HOSPADM

## 2023-09-12 RX ORDER — DILTIAZEM HCL/D5W 125 MG/125
5-15 PLASTIC BAG, INJECTION (ML) INTRAVENOUS
Status: DISCONTINUED | OUTPATIENT
Start: 2023-09-12 | End: 2023-09-13

## 2023-09-12 RX ORDER — SODIUM CHLORIDE 0.9 % (FLUSH) 0.9 %
10 SYRINGE (ML) INJECTION AS NEEDED
Status: DISCONTINUED | OUTPATIENT
Start: 2023-09-12 | End: 2023-09-15 | Stop reason: HOSPADM

## 2023-09-12 RX ORDER — ONDANSETRON 4 MG/1
4 TABLET, FILM COATED ORAL EVERY 6 HOURS PRN
Status: DISCONTINUED | OUTPATIENT
Start: 2023-09-12 | End: 2023-09-15 | Stop reason: HOSPADM

## 2023-09-12 RX ORDER — BISACODYL 5 MG/1
5 TABLET, DELAYED RELEASE ORAL DAILY PRN
Status: DISCONTINUED | OUTPATIENT
Start: 2023-09-12 | End: 2023-09-15 | Stop reason: HOSPADM

## 2023-09-12 RX ORDER — CHOLECALCIFEROL (VITAMIN D3) 125 MCG
5 CAPSULE ORAL NIGHTLY PRN
Status: DISCONTINUED | OUTPATIENT
Start: 2023-09-12 | End: 2023-09-15 | Stop reason: HOSPADM

## 2023-09-12 RX ORDER — ONDANSETRON 2 MG/ML
4 INJECTION INTRAMUSCULAR; INTRAVENOUS EVERY 6 HOURS PRN
Status: DISCONTINUED | OUTPATIENT
Start: 2023-09-12 | End: 2023-09-15 | Stop reason: HOSPADM

## 2023-09-12 RX ORDER — PANTOPRAZOLE SODIUM 40 MG/1
40 TABLET, DELAYED RELEASE ORAL DAILY
Status: DISCONTINUED | OUTPATIENT
Start: 2023-09-13 | End: 2023-09-14

## 2023-09-12 RX ORDER — ROPINIROLE 0.25 MG/1
0.25 TABLET, FILM COATED ORAL ONCE AS NEEDED
Status: COMPLETED | OUTPATIENT
Start: 2023-09-12 | End: 2023-09-12

## 2023-09-12 RX ORDER — DIGOXIN 125 MCG
125 TABLET ORAL DAILY
Status: DISCONTINUED | OUTPATIENT
Start: 2023-09-12 | End: 2023-09-13

## 2023-09-12 RX ADMIN — ROPINIROLE HYDROCHLORIDE 0.25 MG: 0.25 TABLET, FILM COATED ORAL at 23:44

## 2023-09-12 RX ADMIN — Medication 10 ML: at 22:04

## 2023-09-12 RX ADMIN — Medication 5 MG/HR: at 14:41

## 2023-09-12 RX ADMIN — ONDANSETRON 4 MG: 2 INJECTION INTRAMUSCULAR; INTRAVENOUS at 22:04

## 2023-09-12 RX ADMIN — Medication 10 ML: at 15:37

## 2023-09-12 RX ADMIN — ACETAMINOPHEN 650 MG: 325 TABLET, FILM COATED ORAL at 17:42

## 2023-09-12 RX ADMIN — SODIUM CHLORIDE 500 ML: 9 INJECTION, SOLUTION INTRAVENOUS at 16:04

## 2023-09-12 RX ADMIN — DIGOXIN 250 MCG: 0.25 INJECTION INTRAMUSCULAR; INTRAVENOUS at 16:04

## 2023-09-12 RX ADMIN — SODIUM CHLORIDE 500 ML: 9 INJECTION, SOLUTION INTRAVENOUS at 18:10

## 2023-09-12 RX ADMIN — Medication 5 MG: at 23:45

## 2023-09-12 RX ADMIN — DILTIAZEM HYDROCHLORIDE 10 MG: 5 INJECTION, SOLUTION INTRAVENOUS at 14:43

## 2023-09-12 RX ADMIN — HYDROCODONE BITARTRATE AND ACETAMINOPHEN 1 TABLET: 10; 325 TABLET ORAL at 15:37

## 2023-09-12 NOTE — OUTREACH NOTE
Medical Week 1 Survey      Flowsheet Row Responses   [facility] patient discharged from? [facility]   Does the patient have one of the following disease processes/diagnoses(primary or secondary)? Other   Week 1 attempt successful? Yes   Call start time 0854   Call end time 0930   General alerts for this patient HX CHF   Discharge diagnosis AFIB/RVR,  gastritis   Meds reviewed with patient/caregiver? Yes   Is the patient having any side effects they believe may be caused by any medication additions or changes? No   Does the patient have all medications ordered at discharge? No   What is keeping the patient from filling the prescriptions? Lost script/didn't receive   Nursing Interventions Nurse provided patient education, Nurse advised patient to call provider, Nurse called pharmacy   Is the patient taking all medications as directed (includes completed medication regime)? No   What is preventing the patient from taking all medications as directed? Other   Nursing Interventions Nurse notified pharmacy for assistance, Notified home health   Medication comments Pt reports that she does not have the new prescription Pantoprazole or the home medication Zofran listed on the AVS. Called , left message. Pt reports that she has no way to get meds at this time until her son comes back into town from work this weekend. Her son is a .She does not know of any delivering pharmacy locally.Called [facility],spoke w/Svetlaan who reports that they do not have a Rx for Pantoprazole or zofran.   Does the patient have a primary care provider?  Yes   Does the patient have an appointment with their PCP within 7 days of discharge? No   What is preventing the patient from scheduling follow up appointments within 7 days of discharge? Haven't had time   Nursing Interventions Educated patient on importance of making appointment   Has the patient kept scheduled appointments due by today? N/A   What is the Home health  agency?  [facility]   Has home health visited the patient within 72 hours of discharge? Yes   Home health comments SN and MSW have been to home, PT due today.   Psychosocial issues? Yes   Psychosocial comments lives alone, no one available to assist or get meds or take to appt.Pt too sick to drive herself she reports. Has a dog she does not want to leave.   Notified [facility], Psychosocial (Non Urgent)   Comments Pt reports that she is ill today, when she raises/moves her head she c/o nausea.Pt has no appetite, small amt eaten 9-11-23, she is drinking water for hydration and urinating frequently she reports. Pt denies diarrhea or emesis. Pt is lying in bed during call states has no family present to assist, lives alone, has dog. Her only son that assists her is on the road working as  until the weekend, she reports. Offered to call EMS to return to ER as pt reports that she has continued to feel worse since DC but pt is reluctant r/t her dog.Attempted to call sonJordan who is out of town working, NA.   Did the patient receive a copy of their discharge instructions? Yes   Nursing interventions Reviewed instructions with patient   What is the patient's perception of their health status since discharge? Worsening   Is the patient/caregiver able to teach back signs and symptoms related to disease process for when to call PCP? Yes   Is the patient/caregiver able to teach back the hierarchy of who to call/visit for symptoms/problems? PCP, Specialist, Home health nurse, Urgent Care, ED, 911 Yes   Week 1 call completed? Yes   Is the patient interested in additional calls from an ambulatory ? No   Call end time 0930            Evita THOMPSON - [place] Nurse

## 2023-09-12 NOTE — ED PROVIDER NOTES
Subjective   History of Present Illness  83-year-old female with prior medical history of A-fib, CHF, chronic pain presents for generalized weakness for several days.  States she has been getting all her medicines except for a couple that she is out of.  Feels tired when she goes to do anything.  Has been resting much more.  Denies any fevers.  Denies any or vomiting or diarrhea.  States she has not been eating well though because she just does not feel like getting up.  Endorses some body aches.    Review of Systems  Positive for shortness of breath and generalized weakness and myalgias.  Past Medical History:   Diagnosis Date    Acute kidney injury 07/22/2022    Anxiety     Arthritis     Atrial fibrillation     paroxysmal    Broken shoulder     left-- due to fall 11-7-19 was at [facility]    Chronic systolic CHF (congestive heart failure) 01/05/2023    EF 36-40%    Coronary artery disease involving native coronary artery of native heart without angina pectoris 04/02/2021    nonobstructive    DDD (degenerative disc disease), lumbar     Depression     Edema     9/2020 foot    GERD (gastroesophageal reflux disease)     H/O fall     Heart failure with mid-range ejection fraction 03/05/2022    EF 45% per 2D echo    Hypertension     Hypothyroidism     Insomnia     Low back pain     Moderate mitral regurgitation 1/5/2023    Neuropathy     Pain in both feet     PONV (postoperative nausea and vomiting)     Pulmonary hypertension     Radiculopathy     Restless legs     Spondylolisthesis     Stage 3a chronic kidney disease     Urinary incontinence        Allergies   Allergen Reactions    Baclofen Rash    Codeine Nausea Only    Ibuprofen Unknown (See Comments)     Patient doesn't know----Motin    Methocarbamol Unknown (See Comments)     Patient doesn't know    Naproxen Unknown (See Comments)     Pt. Doesn't know     Tizanidine Hcl Other (See Comments)     Syncope     Tolmetin Dizziness     Pt. Doesn't know-- same as Tolectin  "      Past Surgical History:   Procedure Laterality Date    BACK SURGERY  07/19/2018    PDC &  PSF L3-L5 insitu    CARDIAC CATHETERIZATION N/A 4/2/2021    Procedure: Cardiac Catheterization/Vascular Study;  Surgeon: Barrett Evans MD;  Location:  [facility];  Service: Cardiovascular;  Laterality: N/A;    CARDIAC ELECTROPHYSIOLOGY PROCEDURE N/A 1/17/2023    Procedure: Pacemaker DC new;  Surgeon: Baljeet Valero MD;  Location:  [facility];  Service: Cardiovascular;  Laterality: N/A;    CHOLECYSTECTOMY      COLONOSCOPY      HYSTERECTOMY      ROTATOR CUFF REPAIR Left        Family History   Problem Relation Age of Onset    Cancer Father     Diabetes Son     Cancer Paternal Aunt     Heart disease Paternal Aunt     Cancer Paternal Uncle        Social History     Socioeconomic History    Marital status:    Tobacco Use    Smoking status: Never    Smokeless tobacco: Never   Vaping Use    Vaping Use: Never used   Substance and Sexual Activity    Alcohol use: No    Drug use: Never    Sexual activity: Defer           Objective   Physical Exam  Constitutional:  No acute distress.  Head:  Atraumatic.  Normocephalic.   Eyes:  No scleral icterus. Normal conjunctivae  ENT:  Moist mucosa.  No nasal discharge present.  Cardiovascular:  Well perfused.  Equal pulses.  Irregular regular rhythm and tachycardic rate.  Normal capillary refill.    Pulmonary/Chest:  No respiratory distress.  Airway patent.  No tachypnea.  No accessory muscle usage.    Abdominal:  Nondistended. Nontender.   Extremities:  No Deformity  Skin:  Warm, dry  Neurological:  Alert, awake, and appropriate.  Normal speech.      Procedures           ED Course      BP 97/59   Pulse 91   Temp 98.3 °F (36.8 °C)   Resp 16   Ht 160 cm (63\")   Wt 69.9 kg (154 lb)   SpO2 91%   BMI 27.28 kg/m²   Labs Reviewed   COMPREHENSIVE METABOLIC PANEL - Abnormal; Notable for the following components:       Result Value    " Glucose 121 (*)     All other components within normal limits    Narrative:     GFR Normal >60  Chronic Kidney Disease <60  Kidney Failure <15    The GFR formula is only valid for adults with stable renal function between ages 18 and 70.   DIGOXIN LEVEL - Abnormal; Notable for the following components:    Digoxin <0.30 (*)     All other components within normal limits   CBC WITH AUTO DIFFERENTIAL - Abnormal; Notable for the following components:    RDW 17.3 (*)     Lymphocyte % 18.1 (*)     All other components within normal limits   RESPIRATORY PANEL PCR W/ COVID-19 (SARS-COV-2) [facility] IN-HOUSE, NP SWAB IN UTM/VTP, 3-4 HR TAT - Normal    Narrative:     In the setting of a positive respiratory panel with a viral infection PLUS a negative procalcitonin without other underlying concern for bacterial infection, consider observing off antibiotics or discontinuation of antibiotics and continue supportive care. If the respiratory panel is positive for atypical bacterial infection (Bordetella pertussis, Chlamydophila pneumoniae, or Mycoplasma pneumoniae), consider antibiotic de-escalation to target atypical bacterial infection.   TROPONIN - Normal    Narrative:     High Sensitive Troponin T Reference Range:  <10.0 ng/L- Negative Female for AMI  <15.0 ng/L- Negative Male for AMI  >=10 - Abnormal Female indicating possible myocardial injury.  >=15 - Abnormal Male indicating possible myocardial injury.   Clinicians would have to utilize clinical acumen, EKG, Troponin, and serial changes to determine if it is an Acute Myocardial Infarction or myocardial injury due to an underlying chronic condition.        BNP (IN-HOUSE) - Normal    Narrative:     Among patients with dyspnea, NT-proBNP is highly sensitive for the detection of acute congestive heart failure. In addition NT-proBNP of <300 pg/ml effectively rules out acute congestive heart failure with 99% negative predictive value.     HIGH SENSITIVITIY  TROPONIN T 2HR   CBC AND DIFFERENTIAL    Narrative:     The following orders were created for panel order CBC & Differential.  Procedure                               Abnormality         Status                     ---------                               -----------         ------                     CBC Auto Differential[[id number]]        Abnormal            Final result                 Please view results for these tests on the individual orders.   EXTRA TUBES    Narrative:     The following orders were created for panel order Extra Tubes.  Procedure                               Abnormality         Status                     ---------                               -----------         ------                     Gold Top - SST[[id number]]                                   Final result               Light Blue Top[[id number]]                                   Final result                 Please view results for these tests on the individual orders.   GOLD TOP - SST   LIGHT BLUE TOP     Medications   sodium chloride 0.9 % flush 10 mL (10 mL Intravenous Given 9/12/23 1537)   dilTIAZem (CARDIZEM) 125 mg in 125 mL D5W infusion (5 mg/hr Intravenous New Bag 9/12/23 1441)   sodium chloride 0.9 % bolus 500 mL (500 mL Intravenous New Bag 9/12/23 1604)   dilTIAZem (CARDIZEM) injection 10 mg (10 mg Intravenous Given 9/12/23 1443)   HYDROcodone-acetaminophen (NORCO)  MG per tablet 1 tablet (1 tablet Oral Given 9/12/23 1537)   digoxin (LANOXIN) injection 250 mcg (250 mcg Intravenous Given 9/12/23 1604)     XR Chest 1 View    Result Date: 9/12/2023  Impression: No acute cardiopulmonary findings. Electronically Signed: Quan Cazares MD  9/12/2023 2:58 PM EDT  Workstation ID: [id number]                                        Medical Decision Making  Problems Addressed:  Atrial fibrillation with rapid ventricular response: complicated acute illness or injury  Serum digoxin level below therapeutic range: complicated acute illness or  injury    Amount and/or Complexity of Data Reviewed  Labs: ordered.  Radiology: ordered.  ECG/medicine tests: ordered.    Risk  Prescription drug management.  Decision regarding hospitalization.      Critical Care Time     The high probability of sudden, clinically significant deterioration in the patient's condition required the highest level of my preparedness to intervene urgently.  The services I provided to this patient were to treat and/or prevent clinically significant deterioration that could result in: Cardiovascular collapse and death. Services included the following: chart data review, reviewing nurses notes and/or old charts, documentation time, consultant collaboration regarding findings and treatment options, vital sign assessments and ordering, interpreting and reviewing diagnostic studies/lab tests.  Aggregate critical care time was 36 minutes, which includes only time during which I was engaged in work directly related to the patient's care, as described above, whether at the bedside or elsewhere in the Emergency Department. It did not include time spent performing other reported procedures or the services of residents, students, nurses or physician assistants.      EKG # 1  Signed and interpreted by the EP.  Time Interpreted: 2:31 PM  Rate: 127  Rhythm: A-fib with rapid ventricular response  Axis: Normal limits  Intervals: Within normal limits  ST Segments: Nonspecific ST changes including T wave inversions in multiple leads that are new compared to September 5, 2023 EKG     Started on diltiazem.  Patient started to become mildly hypotensive so starting fluids.  Dig level subtherapeutic so ordered dose IV here.  Accepted by [facility] or work about half of hospitalist service.      Final diagnoses:   Atrial fibrillation with rapid ventricular response   Serum digoxin level below therapeutic range       ED Disposition  ED Disposition       ED Disposition   Decision to Admit    Condition   --    Comment    Level of Care: Progressive Care [20]   Diagnosis: Atrial flutter with rapid ventricular response [606179]   Admitting Physician: DOMINIQUE JACKSON [[id number]]   Attending Physician: DOMINIQUE JACKSON [[id number]]                 No follow-up provider specified.       Medication List      No changes were made to your prescriptions during this visit.            Theodore Overton MD  09/12/23 6471

## 2023-09-12 NOTE — Clinical Note
Level of Care: Progressive Care [20]   Admitting Physician: DOMINIQUE JACKSON [[id number]]   Attending Physician: DOMINIQUE JACKSON [[id number]]

## 2023-09-12 NOTE — ED NOTES
Provider informed of pt's decreased BP, new orders to be placed. HR remains stable 80s-90s, pt denies any CP.

## 2023-09-12 NOTE — ED NOTES
"Per pt reports decreased appetite d/t feeling \"sick to my stomach\" for past 3 days, denies any v/d. Pt reports SOA and heart palpitations with exertion. Denies any cough, congestion or recent illness.  "

## 2023-09-12 NOTE — CASE MANAGEMENT/SOCIAL WORK
Discharge Planning Assessment  [facility]     Patient Name: Catalina Moreno  MRN: [id number]  Today's Date: 9/12/2023    Admit Date: 9/12/2023    Plan: Return home,current with [facility]   Discharge Needs Assessment       Row Name 09/12/23 1737       Living Environment    People in Home alone    Current Living Arrangements home    Potentially Unsafe Housing Conditions none    Primary Care Provided by self;child(evangelista)    Provides Primary Care For no one    Family Caregiver if Needed child(evangelista), adult    Quality of Family Relationships helpful    Able to Return to Prior Arrangements yes       Resource/Environmental Concerns    Resource/Environmental Concerns none    Transportation Concerns other (see comments)  Potentially will need [facility] van       Transition Planning    Patient/Family Anticipates Transition to home    Transportation Anticipated family or friend will provide  son Jordan if in town, if not will potentially need  van       Discharge Needs Assessment    Readmission Within the Last 30 Days no previous admission in last 30 days  9/5 obsv    Equipment Currently Used at Home walker, rolling;cane, quad tip    Concerns to be Addressed discharge planning    Discharge Facility/Level of Care Needs home with home health  Current with [facility]                   Discharge Plan       Row Name 09/12/23 7033       Plan    Plan Return home,current with [facility]    Plan Comments Spoke with jason at bedside, lives alone, son Jordan assist when available. Son assist with transportation,confirmed PCP and Pharmacy, Current with [facility].DC Barrier: Cardizem gtt                  Continued Care and Services - Admitted Since 9/12/2023       Home Medical Care       Service Provider Request Status Selected Services Address Phone Fax Patient Preferred    [facility] Pending - [facility] [address] [facility] [place] [address] [phone] --                       Expected  Discharge Date and Time       Expected Discharge Date Expected Discharge Time    Sep 13, 2023            Demographic Summary       Row Name 09/12/23 1736       General Information    Admission Type observation    Arrived From home    Required Notices Provided Observation Status Notice    Referral Source patient    Reason for Consult discharge planning    Preferred Language English                   Functional Status       Row Name 09/12/23 1736       Functional Status    Usual Activity Tolerance moderate    Current Activity Tolerance moderate       Functional Status, IADL    Medications independent    Meal Preparation independent    Laundry independent    Shopping assistive person       Mental Status    General Appearance WDL WDL                Patient Forms       Row Name 09/12/23 1735       Patient Forms    Patient Observation Letter Delivered  9/12/23 Regiistration           Met with patient in room wearing PPE: mask, face shield/goggles, gloves, gown.      Maintained distance greater than six feet and spent less than 15 minutes in the room.     Nan Weir RN

## 2023-09-12 NOTE — HOME HEALTH
"Assessment/Referral:  Pt is an 84 y/o female pt of Dr. Dover.  Pt was recently hospitalized due to extreme weakness and nausea.  Pt diagnosed with afib and discharged home when deemed medically stable.  Pt answered door for PT ambulating with rollator.  Pt states continued nausea and weakness at a debilitating level.  Pt states she isn't able to do anything and feels like she is going to vomit every time she lifts her head.  Pt admits poor appetite and states she has only had water today.  Pt admits RN educated on importance of proper PO intake and PT again encouraged improved oral intake to avoid rehospitalization for malnutrition.  Pt verbalized understanding and agreement although does not seem to be in a hurry to eat.  PT very limited in mobility evaluation due to pt's intolerance to upright position at this time.  PT educated pt on importance of frequent, short bouts, of upright mobility to avoid dependence on supine position.  Pt requests no further skilled PT at this time stating \"I just can't do anything, I can't tolerate it\".  PT educated pt to notify RN when she is able to tolerate PT in the near future so new evaluation may be completed and POC established at that time.  Pt will be provided no further skilled PT services at this time per her request due to inability to tolerate.  PT notified RN to be aware that new orders will need to be input for PT eval when pt is agreeable.     Medication Update:  Per RN note, pt was missing multiple medications at time of SOC.  PT reviewed all meds and noted many to now be present in home but pt still missing a few.  PT placed call to [facility] pharmacy to request filling the following medications: Levothyroxine, Sertraline, Pantoprazole and Trazodone.  Pt's Digoxin is ready for .  Pt states her son will not be able to pick any of these medications up at the pharmacy until he is home this weekend.  PT instructed pt to notify her son that there may be up " to 5 medications prepared for him to  at pharmacy.  Pt verbalized understanding. PT provided pt a mediplanner and assisted to fill for improved compliance with medication administration daily.

## 2023-09-12 NOTE — H&P
[facility] Medicine Services  History & Physical    Patient Name: Catalina Moreno  : 1940  MRN: [id number]  Primary Care Physician:  Rubio Dover MD  Date of admission: 2023  Date and Time of Service: 2023     Subjective      Chief Complaint: generalized weakness    History of Present Illness: Catalina Moreno is a 83 y.o. female with past medical history of hypertension, hyperlipidemia, CHF, A-fib, hypothyroidism, pacemaker for SSS and GERD who presented to [facility] on 2023 complaining of generalized weakness for the past 4 days with intermitted exertional dyspnea. She reports being out of some of her medications for a few days because her son who is a  was unable to pick them up and she is unable to drive herself to pick them up. Denies fever, chest pain, vomiting or diarrhea.     In the ED, chest x-ray showed-no acute cardiopulmonary findings. EKG showed atrial fibrillation, heart rate 127 beats per minutes.  Vitals on admission, temp 98.3, pulse 122, respirations 16, BP 95/43, SPO2 96% on room air.  All labs unremarkable except glucose 121, respiratory panel negative. Patient received 250 mcg digoxin, 10 mg Cardizem bolus and initiation of Cardizem drip, 500 mL normal saline bolus and [place] in ED. Hospitalist was contacted to admit patient for further care and management.     12 point ROS reviewed and negative except as mentioned above.       Personal History     Past Medical History:   Diagnosis Date    Acute kidney injury 2022    Anxiety     Arthritis     Atrial fibrillation     paroxysmal    Broken shoulder     left-- due to fall 20 was at [facility]    Chronic systolic CHF (congestive heart failure) 2023    EF 36-40%    Coronary artery disease involving native coronary artery of native heart without angina pectoris 2021    nonobstructive    DDD (degenerative disc disease), lumbar     Depression     Edema     2020  foot    GERD (gastroesophageal reflux disease)     H/O fall     Heart failure with mid-range ejection fraction 03/05/2022    EF 45% per 2D echo    Hypertension     Hypothyroidism     Insomnia     Low back pain     Moderate mitral regurgitation 1/5/2023    Neuropathy     Pain in both feet     PONV (postoperative nausea and vomiting)     Pulmonary hypertension     Radiculopathy     Restless legs     Spondylolisthesis     Stage 3a chronic kidney disease     Urinary incontinence        Past Surgical History:   Procedure Laterality Date    BACK SURGERY  07/19/2018    PDC &  PSF L3-L5 insitu    CARDIAC CATHETERIZATION N/A 4/2/2021    Procedure: Cardiac Catheterization/Vascular Study;  Surgeon: Barrett Evans MD;  Location: [facility] CATH INVASIVE LOCATION;  Service: Cardiovascular;  Laterality: N/A;    CARDIAC ELECTROPHYSIOLOGY PROCEDURE N/A 1/17/2023    Procedure: Pacemaker DC new;  Surgeon: Baljeet Valero MD;  Location: [facility] CATH INVASIVE LOCATION;  Service: Cardiovascular;  Laterality: N/A;    CHOLECYSTECTOMY      COLONOSCOPY      HYSTERECTOMY      ROTATOR CUFF REPAIR Left        Family History: family history includes Cancer in her father, paternal aunt, and paternal uncle; Diabetes in her son; Heart disease in her paternal aunt. Otherwise pertinent FHx was reviewed and not pertinent to current issue.    Social History:  reports that she has never smoked. She has never used smokeless tobacco. She reports that she does not drink alcohol and does not use drugs.    Home Medications:  Prior to Admission Medications       Prescriptions Last Dose Informant Patient Reported? Taking?    acetaminophen (TYLENOL) 325 MG tablet   Yes No    Take 2 tablets by mouth Every 6 (Six) Hours As Needed for Mild Pain. Indications: Pain    apixaban (ELIQUIS) 5 MG tablet tablet   No No    Take 1 tablet by mouth Every 12 (Twelve) Hours. Indications: Atrial Fibrillation    atorvastatin (LIPITOR) 10 MG tablet  Self No No    Take  1 tablet by mouth Every Night.    Patient taking differently:  Take 1 tablet by mouth Every Night.    digoxin (LANOXIN) 125 MCG tablet   No No    TAKE 1 TABLET BY MOUTH EVERY DAY    gabapentin (NEURONTIN) 600 MG tablet  Medication Bottle Yes No    Take 1 tablet by mouth 4 (Four) Times a Day. Indications: Restless Leg Syndrome, pain    levothyroxine (SYNTHROID, LEVOTHROID) 88 MCG tablet   Yes No    Take 1 tablet by mouth Daily. Indications: Underactive Thyroid    Lidocaine 4 %   Yes No    Place 1 patch on the skin as directed by provider Daily. Remove & Discard patch within 12 hours or as directed by MD    loperamide (IMODIUM A-D) 2 MG tablet   Yes No    Take 1 tablet by mouth 4 (Four) Times a Day As Needed for Diarrhea. Indications: Diarrhea    metoprolol succinate XL (TOPROL-XL) 25 MG 24 hr tablet   No No    Take 1 tablet by mouth Daily.    ondansetron (ZOFRAN) 4 MG tablet  Medication Bottle Yes No    Take 1 tablet by mouth Every 8 (Eight) Hours As Needed for Nausea or Vomiting. Indications: Nausea and Vomiting    pantoprazole (PROTONIX) 40 MG EC tablet  Self Yes No    Take 1 tablet by mouth Daily With Lunch. Afternoons  Indications: Gastroesophageal Reflux Disease    rOPINIRole (REQUIP) 0.25 MG tablet   No No    Take 1 tablet by mouth Every Night. Take 1 hour before bedtime.  Indications: Restless Leg Syndrome    sacubitril-valsartan (Entresto) 24-26 MG tablet   No No    Take 1 tablet by mouth 2 (Two) Times a Day.    sertraline (ZOLOFT) 50 MG tablet   Yes No    Take 1 tablet by mouth Every Night. Indications: Major Depressive Disorder    sildenafil (REVATIO) 20 MG tablet   Yes No    Take 1 tablet by mouth 3 (Three) Times a Day. Indications: Raynaud's Disease    traZODone (DESYREL) 50 MG tablet  Medication Bottle Yes No    Take 0.5 tablets by mouth Daily As Needed (Takes at night time as needed).              Allergies:  Allergies   Allergen Reactions    Baclofen Rash    Codeine Nausea Only    Ibuprofen Unknown  (See Comments)     Patient doesn't know----Motin    Methocarbamol Unknown (See Comments)     Patient doesn't know    Naproxen Unknown (See Comments)     Pt. Doesn't know     Tizanidine Hcl Other (See Comments)     Syncope     Tolmetin Dizziness     Pt. Doesn't know-- same as [place]       Objective      Vitals:   Temp:  [97.8 °F (36.6 °C)-98.3 °F (36.8 °C)] 98.3 °F (36.8 °C)  Heart Rate:  [] 91  Resp:  [16] 16  BP: ()/(43-82) 106/82    Physical Exam  Vitals reviewed.   Constitutional:       General: She is awake. She is not in acute distress.  HENT:      Head: Normocephalic and atraumatic.      Mouth/Throat:      Mouth: Mucous membranes are moist.      Pharynx: Oropharynx is clear.   Eyes:      Extraocular Movements: Extraocular movements intact.      Pupils: Pupils are equal, round, and reactive to light.   Cardiovascular:      Rate and Rhythm: Tachycardia present. Rhythm irregular.      Pulses: Normal pulses.      Heart sounds: Normal heart sounds.   Pulmonary:      Effort: Pulmonary effort is normal.      Breath sounds: Normal breath sounds.   Abdominal:      General: Bowel sounds are normal.      Palpations: Abdomen is soft.   Musculoskeletal:         General: Normal range of motion.      Right lower leg: No edema.      Left lower leg: No edema.   Skin:     General: Skin is warm and dry.   Neurological:      General: No focal deficit present.      Mental Status: She is alert and oriented to person, place, and time.   Psychiatric:         Mood and Affect: Mood normal.         Behavior: Behavior normal. Behavior is cooperative.        Result Review    Result Review:  I have personally reviewed the results from the time of this admission to 9/12/2023 17:10 EDT and agree with these findings:  [x]  Laboratory  [x]  Microbiology  [x]  Radiology  [x]  EKG/Telemetry   [x]  Cardiology/Vascular   []  Pathology  [x]  Old records  []  Other:  Most notable findings include:   In the ED, chest x-ray showed-no  acute cardiopulmonary findings. EKG showed atrial fibrillation, heart rate 127 beats per minutes.  Vitals on admission, temp 98.3, pulse 122, respirations 16, BP 95/43, SPO2 96% on room air.  All labs unremarkable except glucose 121, respiratory panel negative. Patient received 250 mcg digoxin, 10 mg Cardizem bolus and initiation of Cardizem drip, 500 mL normal saline bolus and [place] in ED. Hospitalist was contacted to admit patient for further care and management.       Assessment & Plan        Active Hospital Problems:  Active Hospital Problems    Diagnosis     **Atrial flutter with rapid ventricular response      Plan:     Atrial fibrillation with RVR  - Hx of Afib, pacemaker  - EKG Reviewed, showed atrial fib with heart rate 127 pbm  - ECHO EF 51-55, (09/07/2023)  - Cardizem 10 mg bolus and gtt started in ED, continue, titrate as needed, attempt to wean off  - Digoxin level <0.30  - Digoxin 250 mcg given in ED x1   - Continuous cardiac monitoring  - Patient reports being without her medication for a few days  - Resume home Digoxin at home dose     HTN  CHF  - BP soft on admission 91/47  - Hold home BP meds resume when clinically appropriate     Anxiety  Restless leg syndrome  - chronic, stable  - resume Zoloft and requip   - Inspect completed patient has not had gabapentin filled since 05/16/2023 for a 30 day supply     Hypothyroidism   - TSH 5.460 (09/06/2023)  - resume home synthroid    GERD  - resume PPI       DVT prophylaxis:  No DVT prophylaxis order currently exists.    CODE STATUS:    Level Of Support Discussed With: Patient  Code Status (Patient has no pulse and is not breathing): CPR (Attempt to Resuscitate)  Medical Interventions (Patient has pulse or is breathing): Full Support    Admission Status:  I believe this patient meets observation status.    I discussed the patient's findings and my recommendations with patient.      Signature: Electronically signed by KALYANI Jamison, 09/12/23, 17:10  EDT.  [facility] Hospitalist Team

## 2023-09-13 ENCOUNTER — READMISSION MANAGEMENT (OUTPATIENT)
Dept: CALL CENTER | Facility: HOSPITAL | Age: 83
End: 2023-09-13
Payer: MEDICARE

## 2023-09-13 ENCOUNTER — HOME CARE VISIT (OUTPATIENT)
Dept: HOME HEALTH SERVICES | Facility: HOME HEALTHCARE | Age: 83
End: 2023-09-13
Payer: MEDICARE

## 2023-09-13 LAB
ANION GAP SERPL CALCULATED.3IONS-SCNC: 6 MMOL/L (ref 5–15)
BASOPHILS # BLD AUTO: 0.1 10*3/MM3 (ref 0–0.2)
BASOPHILS NFR BLD AUTO: 1 % (ref 0–1.5)
BUN SERPL-MCNC: 19 MG/DL (ref 8–23)
BUN/CREAT SERPL: 17.6 (ref 7–25)
CALCIUM SPEC-SCNC: 8.3 MG/DL (ref 8.6–10.5)
CHLORIDE SERPL-SCNC: 108 MMOL/L (ref 98–107)
CO2 SERPL-SCNC: 28 MMOL/L (ref 22–29)
CREAT SERPL-MCNC: 1.08 MG/DL (ref 0.57–1)
DEPRECATED RDW RBC AUTO: 56.4 FL (ref 37–54)
EGFRCR SERPLBLD CKD-EPI 2021: 51.4 ML/MIN/1.73
EOSINOPHIL # BLD AUTO: 0.1 10*3/MM3 (ref 0–0.4)
EOSINOPHIL NFR BLD AUTO: 2.3 % (ref 0.3–6.2)
ERYTHROCYTE [DISTWIDTH] IN BLOOD BY AUTOMATED COUNT: 17.8 % (ref 12.3–15.4)
GLUCOSE SERPL-MCNC: 106 MG/DL (ref 65–99)
HCT VFR BLD AUTO: 34.5 % (ref 34–46.6)
HGB BLD-MCNC: 11 G/DL (ref 12–15.9)
LYMPHOCYTES # BLD AUTO: 2 10*3/MM3 (ref 0.7–3.1)
LYMPHOCYTES NFR BLD AUTO: 35.5 % (ref 19.6–45.3)
MCH RBC QN AUTO: 27.4 PG (ref 26.6–33)
MCHC RBC AUTO-ENTMCNC: 32 G/DL (ref 31.5–35.7)
MCV RBC AUTO: 85.5 FL (ref 79–97)
MONOCYTES # BLD AUTO: 0.6 10*3/MM3 (ref 0.1–0.9)
MONOCYTES NFR BLD AUTO: 11.4 % (ref 5–12)
NEUTROPHILS NFR BLD AUTO: 2.8 10*3/MM3 (ref 1.7–7)
NEUTROPHILS NFR BLD AUTO: 49.8 % (ref 42.7–76)
NRBC BLD AUTO-RTO: 0.1 /100 WBC (ref 0–0.2)
PLATELET # BLD AUTO: 219 10*3/MM3 (ref 140–450)
PMV BLD AUTO: 8.6 FL (ref 6–12)
POTASSIUM SERPL-SCNC: 4.2 MMOL/L (ref 3.5–5.2)
QT INTERVAL: 292 MS
QTC INTERVAL: 425 MS
RBC # BLD AUTO: 4.03 10*6/MM3 (ref 3.77–5.28)
SODIUM SERPL-SCNC: 142 MMOL/L (ref 136–145)
WBC NRBC COR # BLD: 5.5 10*3/MM3 (ref 3.4–10.8)

## 2023-09-13 PROCEDURE — 36415 COLL VENOUS BLD VENIPUNCTURE: CPT

## 2023-09-13 PROCEDURE — 85025 COMPLETE CBC W/AUTO DIFF WBC: CPT

## 2023-09-13 PROCEDURE — 99222 1ST HOSP IP/OBS MODERATE 55: CPT | Performed by: INTERNAL MEDICINE

## 2023-09-13 PROCEDURE — 97162 PT EVAL MOD COMPLEX 30 MIN: CPT

## 2023-09-13 PROCEDURE — 80048 BASIC METABOLIC PNL TOTAL CA: CPT

## 2023-09-13 PROCEDURE — 63710000001 ONDANSETRON PER 8 MG

## 2023-09-13 RX ORDER — POLYETHYLENE GLYCOL 3350 17 G/17G
17 POWDER, FOR SOLUTION ORAL DAILY
Status: DISCONTINUED | OUTPATIENT
Start: 2023-09-13 | End: 2023-09-15 | Stop reason: HOSPADM

## 2023-09-13 RX ORDER — GABAPENTIN 600 MG/1
600 TABLET ORAL 4 TIMES DAILY
Status: DISCONTINUED | OUTPATIENT
Start: 2023-09-13 | End: 2023-09-13

## 2023-09-13 RX ORDER — ATORVASTATIN CALCIUM 10 MG/1
10 TABLET, FILM COATED ORAL DAILY
COMMUNITY

## 2023-09-13 RX ORDER — ATORVASTATIN CALCIUM 10 MG/1
10 TABLET, FILM COATED ORAL NIGHTLY
Status: DISCONTINUED | OUTPATIENT
Start: 2023-09-13 | End: 2023-09-15 | Stop reason: HOSPADM

## 2023-09-13 RX ORDER — DIGOXIN 125 MCG
125 TABLET ORAL
Status: ON HOLD | COMMUNITY
End: 2023-09-18

## 2023-09-13 RX ORDER — DIGOXIN 125 MCG
125 TABLET ORAL
Status: DISCONTINUED | OUTPATIENT
Start: 2023-09-13 | End: 2023-09-15 | Stop reason: HOSPADM

## 2023-09-13 RX ORDER — HYDROCODONE BITARTRATE AND ACETAMINOPHEN 5; 325 MG/1; MG/1
1 TABLET ORAL EVERY 6 HOURS PRN
Status: DISCONTINUED | OUTPATIENT
Start: 2023-09-13 | End: 2023-09-15 | Stop reason: HOSPADM

## 2023-09-13 RX ORDER — TRAZODONE HYDROCHLORIDE 50 MG/1
25 TABLET ORAL NIGHTLY PRN
Status: DISCONTINUED | OUTPATIENT
Start: 2023-09-13 | End: 2023-09-15 | Stop reason: HOSPADM

## 2023-09-13 RX ORDER — LEVOTHYROXINE SODIUM 88 UG/1
88 TABLET ORAL
Status: DISCONTINUED | OUTPATIENT
Start: 2023-09-13 | End: 2023-09-15 | Stop reason: HOSPADM

## 2023-09-13 RX ORDER — DOCUSATE SODIUM 100 MG/1
100 CAPSULE, LIQUID FILLED ORAL DAILY
Status: DISCONTINUED | OUTPATIENT
Start: 2023-09-13 | End: 2023-09-15 | Stop reason: HOSPADM

## 2023-09-13 RX ORDER — GABAPENTIN 600 MG/1
600 TABLET ORAL 4 TIMES DAILY
Status: DISCONTINUED | OUTPATIENT
Start: 2023-09-13 | End: 2023-09-15 | Stop reason: HOSPADM

## 2023-09-13 RX ORDER — ONDANSETRON 2 MG/ML
4 INJECTION INTRAMUSCULAR; INTRAVENOUS EVERY 6 HOURS PRN
Status: DISCONTINUED | OUTPATIENT
Start: 2023-09-13 | End: 2023-09-15 | Stop reason: HOSPADM

## 2023-09-13 RX ADMIN — HYDROCODONE BITARTRATE AND ACETAMINOPHEN 1 TABLET: 5; 325 TABLET ORAL at 22:49

## 2023-09-13 RX ADMIN — GABAPENTIN 600 MG: 600 TABLET, FILM COATED ORAL at 21:11

## 2023-09-13 RX ADMIN — SENNOSIDES AND DOCUSATE SODIUM 2 TABLET: 50; 8.6 TABLET ORAL at 21:11

## 2023-09-13 RX ADMIN — PANTOPRAZOLE SODIUM 40 MG: 40 TABLET, DELAYED RELEASE ORAL at 08:32

## 2023-09-13 RX ADMIN — ATORVASTATIN CALCIUM 10 MG: 10 TABLET, FILM COATED ORAL at 21:11

## 2023-09-13 RX ADMIN — ROPINIROLE HYDROCHLORIDE 0.25 MG: 0.25 TABLET, FILM COATED ORAL at 21:11

## 2023-09-13 RX ADMIN — APIXABAN 5 MG: 5 TABLET, FILM COATED ORAL at 08:33

## 2023-09-13 RX ADMIN — HYDROCODONE BITARTRATE AND ACETAMINOPHEN 1 TABLET: 5; 325 TABLET ORAL at 16:06

## 2023-09-13 RX ADMIN — Medication 10 ML: at 08:33

## 2023-09-13 RX ADMIN — GABAPENTIN 600 MG: 600 TABLET, FILM COATED ORAL at 15:51

## 2023-09-13 RX ADMIN — ONDANSETRON HYDROCHLORIDE 4 MG: 4 TABLET, FILM COATED ORAL at 11:16

## 2023-09-13 RX ADMIN — ACETAMINOPHEN 650 MG: 325 TABLET, FILM COATED ORAL at 11:16

## 2023-09-13 RX ADMIN — APIXABAN 5 MG: 5 TABLET, FILM COATED ORAL at 21:11

## 2023-09-13 RX ADMIN — LEVOTHYROXINE SODIUM 88 MCG: 0.09 TABLET ORAL at 06:46

## 2023-09-13 RX ADMIN — DIGOXIN 125 MCG: 125 TABLET ORAL at 11:16

## 2023-09-13 RX ADMIN — Medication 10 ML: at 21:11

## 2023-09-13 RX ADMIN — SERTRALINE 50 MG: 50 TABLET, FILM COATED ORAL at 21:11

## 2023-09-13 NOTE — ED NOTES
Provider made aware of pt's continued low BP, no new orders at this time. Will continue to observe pt. HR remains stable. Pt denies any SOA or CP.

## 2023-09-13 NOTE — PLAN OF CARE
"Goal Outcome Evaluation:  Plan of Care Reviewed With: patient           Outcome Evaluation: Catalina Moreno is an 83 y.o. female admitted with c/o generalized weakness and dyspnea who is found to have Aflutter with RVR.  PMH: HTN, HLD, CHF Afib, Hypothryoidism, Pacemaker for SSS and GERD.  Pt lives alone in a SSH with no VLADIMIR.  She is typically independent with mobility with SPC or RW - depending on how she feels.  She has no hx of falls x 6 months. She drives 'if I need to\" but son gets her groceries when for her when he isn't working. At time of evaluation, she is A&O x 3.  She is independent with bed mobility and transfers.  She waslks 80' with RW and SBA-mod I.  Pt's heart rate is variable throughout session.  Pt should be safe to discharge home without follow up PT      Anticipated Discharge Disposition (PT): home  "

## 2023-09-13 NOTE — ED NOTES
Nursing report ED to floor  Catalina Moreno  83 y.o.  female    HPI:   Chief Complaint   Patient presents with    Weakness - Generalized       Admitting doctor:   Alexia Nickerson MD    Admitting diagnosis:   The primary encounter diagnosis was Atrial fibrillation with rapid ventricular response. A diagnosis of Serum digoxin level below therapeutic range was also pertinent to this visit.    Code status:   Current Code Status       Date Active Code Status Order ID Comments User Context       9/12/2023 1639 CPR (Attempt to Resuscitate) [id number]  Cee Brasher, APRN ED        Question Answer    Code Status (Patient has no pulse and is not breathing) CPR (Attempt to Resuscitate)    Medical Interventions (Patient has pulse or is breathing) Full Support    Level Of Support Discussed With Patient                    Allergies:   Baclofen, Codeine, Ibuprofen, Methocarbamol, Naproxen, Tizanidine hcl, and Tolmetin    Isolation:  No active isolations     Fall Risk:  Fall Risk Assessment was completed, and patient is at high risk for falls.   Predictive Model Details         11 (Low) Factor Value    Calculated 9/12/2023 18:07 Age 83    Risk of Fall Model Musculoskeletal Assessment WDL     Active Peripheral IV Present     Imaging order in this encounter Present     Skin Assessment WDL     Diastolic BP 54     Number of Distinct Medication Classes administered 5     Magnesium not on file     Financial Class Medicare     Drug Use No     Rick Scale not on file     Total Bilirubin 1 mg/dL     Peripheral Vascular Assessment WDL     Cardiac Assessment X     Number of administrations of Analgesic Narcotics 1     Respiratory Rate 16     Gastrointestinal Assessment X     Albumin 4.2 g/dL     Days after Admission 0.161     ALT 8 U/L     Calcium 9.5 mg/dL     Potassium 4.2 mmol/L     Chloride 104 mmol/L     Creatinine 0.72 mg/dL         Weight:       09/12/23  1413   Weight: 69.9 kg (154 lb)       Intake and Output    Intake/Output  Summary (Last 24 hours) at 9/12/2023 2056  Last data filed at 9/12/2023 1904  Gross per 24 hour   Intake 1000 ml   Output --   Net 1000 ml       Diet:   Dietary Orders (From admission, onward)       Start     Ordered    09/12/23 1639  Diet: Cardiac Diets; Healthy Heart (2-3 Na+); Texture: Regular Texture (IDDSI 7); Fluid Consistency: Thin (IDDSI 0)  Diet Effective Now        References:    Diet Order Crosswalk   Question Answer Comment   Diets: Cardiac Diets    Cardiac Diet: Healthy Heart (2-3 Na+)    Texture: Regular Texture (IDDSI 7)    Fluid Consistency: Thin (IDDSI 0)        09/12/23 1639                     Most recent vitals:   Vitals:    09/12/23 1806 09/12/23 1831 09/12/23 1933 09/12/23 2022   BP: (!) 87/39 (!) 76/35 103/55 90/46   Pulse: 94 77 87 86   Resp:       Temp:       SpO2: 94% 91% 95% 92%   Weight:       Height:           Active LDAs/IV Access:   Lines, Drains & Airways       Active LDAs       Name Placement date Placement time Site Days    Peripheral IV 09/12/23 1437 Right Antecubital 09/12/23 1437  Antecubital  less than 1                    Skin Condition:   Skin Assessments (last day)       Date/Time Skin WDL    09/12/23 1435 WDL             Labs (abnormal labs have a star):   Labs Reviewed   COMPREHENSIVE METABOLIC PANEL - Abnormal; Notable for the following components:       Result Value    Glucose 121 (*)     All other components within normal limits    Narrative:     GFR Normal >60  Chronic Kidney Disease <60  Kidney Failure <15    The GFR formula is only valid for adults with stable renal function between ages 18 and 70.   DIGOXIN LEVEL - Abnormal; Notable for the following components:    Digoxin <0.30 (*)     All other components within normal limits   CBC WITH AUTO DIFFERENTIAL - Abnormal; Notable for the following components:    RDW 17.3 (*)     Lymphocyte % 18.1 (*)     All other components within normal limits   HIGH SENSITIVITIY TROPONIN T 2HR - Abnormal; Notable for the following  components:    HS Troponin T 10 (*)     All other components within normal limits    Narrative:     High Sensitive Troponin T Reference Range:  <10.0 ng/L- Negative Female for AMI  <15.0 ng/L- Negative Male for AMI  >=10 - Abnormal Female indicating possible myocardial injury.  >=15 - Abnormal Male indicating possible myocardial injury.   Clinicians would have to utilize clinical acumen, EKG, Troponin, and serial changes to determine if it is an Acute Myocardial Infarction or myocardial injury due to an underlying chronic condition.        RESPIRATORY PANEL PCR W/ COVID-19 (SARS-COV-2) WENDI/MIKAYLA/KEVEN/PAD/COR/MAD/DWIGHT IN-HOUSE, NP SWAB IN UTM/VTP, 3-4 HR TAT - Normal    Narrative:     In the setting of a positive respiratory panel with a viral infection PLUS a negative procalcitonin without other underlying concern for bacterial infection, consider observing off antibiotics or discontinuation of antibiotics and continue supportive care. If the respiratory panel is positive for atypical bacterial infection (Bordetella pertussis, Chlamydophila pneumoniae, or Mycoplasma pneumoniae), consider antibiotic de-escalation to target atypical bacterial infection.   TROPONIN - Normal    Narrative:     High Sensitive Troponin T Reference Range:  <10.0 ng/L- Negative Female for AMI  <15.0 ng/L- Negative Male for AMI  >=10 - Abnormal Female indicating possible myocardial injury.  >=15 - Abnormal Male indicating possible myocardial injury.   Clinicians would have to utilize clinical acumen, EKG, Troponin, and serial changes to determine if it is an Acute Myocardial Infarction or myocardial injury due to an underlying chronic condition.        BNP (IN-HOUSE) - Normal    Narrative:     Among patients with dyspnea, NT-proBNP is highly sensitive for the detection of acute congestive heart failure. In addition NT-proBNP of <300 pg/ml effectively rules out acute congestive heart failure with 99% negative predictive value.     CBC AND  DIFFERENTIAL    Narrative:     The following orders were created for panel order CBC & Differential.  Procedure                               Abnormality         Status                     ---------                               -----------         ------                     CBC Auto Differential[[id number]]        Abnormal            Final result                 Please view results for these tests on the individual orders.   EXTRA TUBES    Narrative:     The following orders were created for panel order Extra Tubes.  Procedure                               Abnormality         Status                     ---------                               -----------         ------                     Gold Top - SST[[id number]]                                   Final result               Light Blue Top[[id number]]                                   Final result                 Please view results for these tests on the individual orders.   GOLD [facility]   LIGHT BLUE TOP       LOC: Person, Place, Time, and Situation    Telemetry:  Progressive Care    Cardiac Monitoring Ordered: yes    EKG:   ECG 12 Lead Dyspnea   Preliminary Result   HEART RATE= 127  bpm   RR Interval= 472  ms   LA Interval=   ms   P Horizontal Axis=   deg   P Front Axis=   deg   QRSD Interval= 69  ms   QT Interval= 292  ms   QTcB= 425  ms   QRS Axis= 50  deg   T Wave Axis= 132  deg   - ABNORMAL ECG -   Atrial fibrillation   Nonspecific T abnrm, anterolateral leads   Electronically Signed By:    Date and Time of Study: 2023-09-12 14:31:11          Medications Given in the ED:   Medications   sodium chloride 0.9 % flush 10 mL (10 mL Intravenous Given 9/12/23 1537)   dilTIAZem (CARDIZEM) 125 mg in 125 mL D5W infusion (0 mg/hr Intravenous Stopped 9/12/23 1855)   Potassium Replacement - Follow Nurse / BPA Driven Protocol (has no administration in time range)   Magnesium Cardiology Dose Replacement - Follow Nurse / BPA Driven Protocol (has no administration in time  range)   acetaminophen (TYLENOL) tablet 650 mg (650 mg Oral Given 9/12/23 1742)     Or   acetaminophen (TYLENOL) 160 MG/5ML oral solution 650 mg ( Oral Not Given:  See Alt 9/12/23 1742)     Or   acetaminophen (TYLENOL) suppository 650 mg ( Rectal Not Given:  See Alt 9/12/23 1742)   aluminum-magnesium hydroxide-simethicone (MAALOX MAX) 400-400-40 MG/5ML suspension 15 mL (has no administration in time range)   sennosides-docusate (PERICOLACE) 8.6-50 MG per tablet 2 tablet (has no administration in time range)     And   polyethylene glycol (MIRALAX) packet 17 g (has no administration in time range)     And   bisacodyl (DULCOLAX) EC tablet 5 mg (has no administration in time range)     And   bisacodyl (DULCOLAX) suppository 10 mg (has no administration in time range)   ondansetron (ZOFRAN) tablet 4 mg (has no administration in time range)     Or   ondansetron (ZOFRAN) injection 4 mg (has no administration in time range)   melatonin tablet 5 mg (has no administration in time range)   apixaban (ELIQUIS) tablet 5 mg (has no administration in time range)   atorvastatin (LIPITOR) tablet 10 mg (has no administration in time range)   digoxin (LANOXIN) tablet 125 mcg (has no administration in time range)   levothyroxine (SYNTHROID, LEVOTHROID) tablet 88 mcg (has no administration in time range)   pantoprazole (PROTONIX) EC tablet 40 mg (has no administration in time range)   rOPINIRole (REQUIP) tablet 0.25 mg (has no administration in time range)   sertraline (ZOLOFT) tablet 50 mg (has no administration in time range)   dilTIAZem (CARDIZEM) injection 10 mg (10 mg Intravenous Given 9/12/23 1443)   HYDROcodone-acetaminophen (NORCO)  MG per tablet 1 tablet (1 tablet Oral Given 9/12/23 1537)   sodium chloride 0.9 % bolus 500 mL (0 mL Intravenous Stopped 9/12/23 1744)   digoxin (LANOXIN) injection 250 mcg (250 mcg Intravenous Given 9/12/23 1604)   sodium chloride 0.9 % bolus 500 mL (0 mL Intravenous Stopped 9/12/23 1904)        Imaging results:  XR Chest 1 View    Result Date: 9/12/2023  Impression: No acute cardiopulmonary findings. Electronically Signed: Quan Cazares MD  9/12/2023 2:58 PM EDT  Workstation ID: [id number]     Social issues:   Social History     Socioeconomic History    Marital status:    Tobacco Use    Smoking status: Never    Smokeless tobacco: Never   Vaping Use    Vaping Use: Never used   Substance and Sexual Activity    Alcohol use: No    Drug use: Never    Sexual activity: Defer       NIH Stroke Scale:  Interval: (not recorded)  1a. Level of Consciousness: (not recorded)  1b. LOC Questions: (not recorded)  1c. LOC Commands: (not recorded)  2. Best Gaze: (not recorded)  3. Visual: (not recorded)  4. Facial Palsy: (not recorded)  5a. Motor Arm, Left: (not recorded)  5b. Motor Arm, Right: (not recorded)  6a. Motor Leg, Left: (not recorded)  6b. Motor Leg, Right: (not recorded)  7. Limb Ataxia: (not recorded)  8. Sensory: (not recorded)  9. Best Language: (not recorded)  10. Dysarthria: (not recorded)  11. Extinction and Inattention (formerly Neglect): (not recorded)    Total (NIH Stroke Scale): (not recorded)     Additional notable assessment information:     Nursing report ED to floor:  Report given to BRENDAN Haddad LPN   09/12/23 20:56 EDT Nursing report ED to floor  Catalinajd Moreno  83 y.o.  female    HPI:   Chief Complaint   Patient presents with    Weakness - Generalized       Admitting doctor:   Alexia Nickerson MD    Admitting diagnosis:   The primary encounter diagnosis was Atrial fibrillation with rapid ventricular response. A diagnosis of Serum digoxin level below therapeutic range was also pertinent to this visit.    Code status:   Current Code Status       Date Active Code Status Order ID Comments User Context       9/12/2023 1639 CPR (Attempt to Resuscitate) [id number]  Cee Brasher, APRN ED        Question Answer    Code Status (Patient has no pulse and is not breathing) CPR  (Attempt to Resuscitate)    Medical Interventions (Patient has pulse or is breathing) Full Support    Level Of Support Discussed With Patient                    Allergies:   Baclofen, Codeine, Ibuprofen, Methocarbamol, Naproxen, Tizanidine hcl, and Tolmetin    Isolation:  No active isolations     Fall Risk:  Fall Risk Assessment was completed, and patient is at high risk for falls.   Predictive Model Details         11 (Low) Factor Value    Calculated 9/12/2023 18:07 Age 83    Risk of Fall Model Musculoskeletal Assessment WDL     Active Peripheral IV Present     Imaging order in this encounter Present     Skin Assessment WDL     Diastolic BP 54     Number of Distinct Medication Classes administered 5     Magnesium not on file     Financial Class Medicare     Drug Use No     Rick Scale not on file     Total Bilirubin 1 mg/dL     Peripheral Vascular Assessment WDL     Cardiac Assessment X     Number of administrations of Analgesic Narcotics 1     Respiratory Rate 16     Gastrointestinal Assessment X     Albumin 4.2 g/dL     Days after Admission 0.161     ALT 8 U/L     Calcium 9.5 mg/dL     Potassium 4.2 mmol/L     Chloride 104 mmol/L     Creatinine 0.72 mg/dL         Weight:       09/12/23  1413   Weight: 69.9 kg (154 lb)       Intake and Output    Intake/Output Summary (Last 24 hours) at 9/12/2023 2056  Last data filed at 9/12/2023 1904  Gross per 24 hour   Intake 1000 ml   Output --   Net 1000 ml       Diet:   Dietary Orders (From admission, onward)       Start     Ordered    09/12/23 1639  Diet: Cardiac Diets; Healthy Heart (2-3 Na+); Texture: Regular Texture (IDDSI 7); Fluid Consistency: Thin (IDDSI 0)  Diet Effective Now        References:    Diet Order Crosswalk   Question Answer Comment   Diets: Cardiac Diets    Cardiac Diet: Healthy Heart (2-3 Na+)    Texture: Regular Texture (IDDSI 7)    Fluid Consistency: Thin (IDDSI 0)        09/12/23 1639                     Most recent vitals:   Vitals:    09/12/23 1806  09/12/23 1831 09/12/23 1933 09/12/23 2022   BP: (!) 87/39 (!) 76/35 103/55 90/46   Pulse: 94 77 87 86   Resp:       Temp:       SpO2: 94% 91% 95% 92%   Weight:       Height:           Active LDAs/IV Access:   Lines, Drains & Airways       Active LDAs       Name Placement date Placement time Site Days    Peripheral IV 09/12/23 1437 Right Antecubital 09/12/23  1437  Antecubital  less than 1                    Skin Condition:   Skin Assessments (last day)       Date/Time Skin WDL    09/12/23 1435 WDL             Labs (abnormal labs have a star):   Labs Reviewed   COMPREHENSIVE METABOLIC PANEL - Abnormal; Notable for the following components:       Result Value    Glucose 121 (*)     All other components within normal limits    Narrative:     GFR Normal >60  Chronic Kidney Disease <60  Kidney Failure <15    The GFR formula is only valid for adults with stable renal function between ages 18 and 70.   DIGOXIN LEVEL - Abnormal; Notable for the following components:    Digoxin <0.30 (*)     All other components within normal limits   CBC WITH AUTO DIFFERENTIAL - Abnormal; Notable for the following components:    RDW 17.3 (*)     Lymphocyte % 18.1 (*)     All other components within normal limits   HIGH SENSITIVITIY TROPONIN T 2HR - Abnormal; Notable for the following components:    HS Troponin T 10 (*)     All other components within normal limits    Narrative:     High Sensitive Troponin T Reference Range:  <10.0 ng/L- Negative Female for AMI  <15.0 ng/L- Negative Male for AMI  >=10 - Abnormal Female indicating possible myocardial injury.  >=15 - Abnormal Male indicating possible myocardial injury.   Clinicians would have to utilize clinical acumen, EKG, Troponin, and serial changes to determine if it is an Acute Myocardial Infarction or myocardial injury due to an underlying chronic condition.        RESPIRATORY PANEL PCR W/ COVID-19 (SARS-COV-2) WENDI/MIKAYLA/KEVEN/PAD/COR/MAD/DWIGHT IN-HOUSE, NP SWAB IN UTM/VTP, 3-4 HR TAT -  Normal    Narrative:     In the setting of a positive respiratory panel with a viral infection PLUS a negative procalcitonin without other underlying concern for bacterial infection, consider observing off antibiotics or discontinuation of antibiotics and continue supportive care. If the respiratory panel is positive for atypical bacterial infection (Bordetella pertussis, Chlamydophila pneumoniae, or Mycoplasma pneumoniae), consider antibiotic de-escalation to target atypical bacterial infection.   TROPONIN - Normal    Narrative:     High Sensitive Troponin T Reference Range:  <10.0 ng/L- Negative Female for AMI  <15.0 ng/L- Negative Male for AMI  >=10 - Abnormal Female indicating possible myocardial injury.  >=15 - Abnormal Male indicating possible myocardial injury.   Clinicians would have to utilize clinical acumen, EKG, Troponin, and serial changes to determine if it is an Acute Myocardial Infarction or myocardial injury due to an underlying chronic condition.        BNP (IN-HOUSE) - Normal    Narrative:     Among patients with dyspnea, NT-proBNP is highly sensitive for the detection of acute congestive heart failure. In addition NT-proBNP of <300 pg/ml effectively rules out acute congestive heart failure with 99% negative predictive value.     CBC AND DIFFERENTIAL    Narrative:     The following orders were created for panel order CBC & Differential.  Procedure                               Abnormality         Status                     ---------                               -----------         ------                     CBC Auto Differential[[id number]]        Abnormal            Final result                 Please view results for these tests on the individual orders.   EXTRA TUBES    Narrative:     The following orders were created for panel order Extra Tubes.  Procedure                               Abnormality         Status                     ---------                               -----------          ------                     Gold Top - SST[[id number]]                                   Final result               Light Blue Top[[id number]]                                   Final result                 Please view results for these tests on the individual orders.   [facility]   LIGHT BLUE TOP       LOC: Person, Place, Time, and Situation    Telemetry:  Progressive Care    Cardiac Monitoring Ordered: yes    EKG:   ECG 12 Lead Dyspnea   Preliminary Result   HEART RATE= 127  bpm   RR Interval= 472  ms   GA Interval=   ms   P Horizontal Axis=   deg   P Front Axis=   deg   QRSD Interval= 69  ms   QT Interval= 292  ms   QTcB= 425  ms   QRS Axis= 50  deg   T Wave Axis= 132  deg   - ABNORMAL ECG -   Atrial fibrillation   Nonspecific T abnrm, anterolateral leads   Electronically Signed By:    Date and Time of Study: 2023-09-12 14:31:11          Medications Given in the ED:   Medications   sodium chloride 0.9 % flush 10 mL (10 mL Intravenous Given 9/12/23 1537)   dilTIAZem (CARDIZEM) 125 mg in 125 mL D5W infusion (0 mg/hr Intravenous Stopped 9/12/23 1855)   Potassium Replacement - Follow Nurse / BPA Driven Protocol (has no administration in time range)   Magnesium Cardiology Dose Replacement - Follow Nurse / BPA Driven Protocol (has no administration in time range)   acetaminophen (TYLENOL) tablet 650 mg (650 mg Oral Given 9/12/23 1742)     Or   acetaminophen (TYLENOL) 160 MG/5ML oral solution 650 mg ( Oral Not Given:  See Alt 9/12/23 1742)     Or   acetaminophen (TYLENOL) suppository 650 mg ( Rectal Not Given:  See Alt 9/12/23 1742)   aluminum-magnesium hydroxide-simethicone (MAALOX MAX) 400-400-40 MG/5ML suspension 15 mL (has no administration in time range)   sennosides-docusate (PERICOLACE) 8.6-50 MG per tablet 2 tablet (has no administration in time range)     And   polyethylene glycol (MIRALAX) packet 17 g (has no administration in time range)     And   bisacodyl (DULCOLAX) EC tablet 5 mg (has no administration in  time range)     And   bisacodyl (DULCOLAX) suppository 10 mg (has no administration in time range)   ondansetron (ZOFRAN) tablet 4 mg (has no administration in time range)     Or   ondansetron (ZOFRAN) injection 4 mg (has no administration in time range)   melatonin tablet 5 mg (has no administration in time range)   apixaban (ELIQUIS) tablet 5 mg (has no administration in time range)   atorvastatin (LIPITOR) tablet 10 mg (has no administration in time range)   digoxin (LANOXIN) tablet 125 mcg (has no administration in time range)   levothyroxine (SYNTHROID, LEVOTHROID) tablet 88 mcg (has no administration in time range)   pantoprazole (PROTONIX) EC tablet 40 mg (has no administration in time range)   rOPINIRole (REQUIP) tablet 0.25 mg (has no administration in time range)   sertraline (ZOLOFT) tablet 50 mg (has no administration in time range)   dilTIAZem (CARDIZEM) injection 10 mg (10 mg Intravenous Given 9/12/23 1443)   HYDROcodone-acetaminophen (NORCO)  MG per tablet 1 tablet (1 tablet Oral Given 9/12/23 1537)   sodium chloride 0.9 % bolus 500 mL (0 mL Intravenous Stopped 9/12/23 1744)   digoxin (LANOXIN) injection 250 mcg (250 mcg Intravenous Given 9/12/23 1604)   sodium chloride 0.9 % bolus 500 mL (0 mL Intravenous Stopped 9/12/23 1904)       Imaging results:  XR Chest 1 View    Result Date: 9/12/2023  Impression: No acute cardiopulmonary findings. Electronically Signed: Quan Cazares MD  9/12/2023 2:58 PM EDT  Workstation ID: [id number]     Social issues:   Social History     Socioeconomic History    Marital status:    Tobacco Use    Smoking status: Never    Smokeless tobacco: Never   Vaping Use    Vaping Use: Never used   Substance and Sexual Activity    Alcohol use: No    Drug use: Never    Sexual activity: Defer       NIH Stroke Scale:  Interval: (not recorded)  1a. Level of Consciousness: (not recorded)  1b. LOC Questions: (not recorded)  1c. LOC Commands: (not recorded)  2. Best Gaze: (not  recorded)  3. Visual: (not recorded)  4. Facial Palsy: (not recorded)  5a. Motor Arm, Left: (not recorded)  5b. Motor Arm, Right: (not recorded)  6a. Motor Leg, Left: (not recorded)  6b. Motor Leg, Right: (not recorded)  7. Limb Ataxia: (not recorded)  8. Sensory: (not recorded)  9. Best Language: (not recorded)  10. Dysarthria: (not recorded)  11. Extinction and Inattention (formerly Neglect): (not recorded)    Total (NIH Stroke Scale): (not recorded)     Additional notable assessment information:     Nursing report ED to floor:  Report given to BRENDAN Haddad LPN   09/12/23 20:56 EDT

## 2023-09-13 NOTE — THERAPY EVALUATION
Patient Name: Catalina Moreno  : 1940    MRN: [id number]                              Today's Date: 2023       Admit Date: 2023    Visit Dx:     ICD-10-CM ICD-9-CM   1. Atrial fibrillation with rapid ventricular response  I48.91 427.31   2. Serum digoxin level below therapeutic range  R78.89 790.6     Patient Active Problem List   Diagnosis    Arthritis    Other long term (current) drug therapy    Polyarthralgia    Sacroiliitis    Depressive disorder    Primary fibromyalgia syndrome    Lightheadedness    Hypothyroidism    Lumbar radiculopathy    Nonrheumatic tricuspid valve regurgitation    Atrial fibrillation with RVR    Acquired spondylolisthesis of lumbosacral region    Vitamin D deficiency    Stage 3a chronic kidney disease    DDD (degenerative disc disease), cervical    Chronic low back pain    Cervical stenosis of spinal canal    Anemia    Polypharmacy    Age-related cognitive decline    Edema, unspecified    Generalized anxiety disorder    History of falling    Insomnia, unspecified    Need for assistance with personal care    Other dysphagia    Polyneuropathy, unspecified    Unsteadiness on feet    Restless legs syndrome    Paroxysmal atrial fibrillation    Chronic pain disorder    Other intervertebral disc degeneration, lumbar region    Essential (primary) hypertension    Spondylolisthesis, lumbar region    Gastro-esophageal reflux disease without esophagitis    Pulmonary hypertension, unspecified    Chronic systolic CHF (congestive heart failure)    Coronary artery disease involving native coronary artery of native heart without angina pectoris    Moderate mitral regurgitation    Class 1 obesity due to excess calories with serious comorbidity and body mass index (BMI) of 30.0 to 30.9 in adult    Hypotension    Sick sinus syndrome    Presence of cardiac pacemaker    Diarrhea with dehydration    Chronic HFrEF (heart failure with reduced ejection fraction)    Atrial flutter with rapid  ventricular response     Past Medical History:   Diagnosis Date    Acute kidney injury 07/22/2022    Anxiety     Arthritis     Atrial fibrillation     paroxysmal    Broken shoulder     left-- due to fall 11-7-19 was at [facility]    Chronic systolic CHF (congestive heart failure) 01/05/2023    EF 36-40%    Coronary artery disease involving native coronary artery of native heart without angina pectoris 04/02/2021    nonobstructive    DDD (degenerative disc disease), lumbar     Depression     Edema     9/2020 foot    GERD (gastroesophageal reflux disease)     H/O fall     Heart failure with mid-range ejection fraction 03/05/2022    EF 45% per 2D echo    Hypertension     Hypothyroidism     Insomnia     Low back pain     Moderate mitral regurgitation 1/5/2023    Neuropathy     Pain in both feet     PONV (postoperative nausea and vomiting)     Pulmonary hypertension     Radiculopathy     Restless legs     Spondylolisthesis     Stage 3a chronic kidney disease     Urinary incontinence      Past Surgical History:   Procedure Laterality Date    BACK SURGERY  07/19/2018    PDC &  PSF L3-L5 insitu    CARDIAC CATHETERIZATION N/A 4/2/2021    Procedure: Cardiac Catheterization/Vascular Study;  Surgeon: Barrett Evans MD;  Location: [facility] CATH INVASIVE LOCATION;  Service: Cardiovascular;  Laterality: N/A;    CARDIAC ELECTROPHYSIOLOGY PROCEDURE N/A 1/17/2023    Procedure: Pacemaker DC new;  Surgeon: Baljeet Valero MD;  Location: [facility] CATH INVASIVE LOCATION;  Service: Cardiovascular;  Laterality: N/A;    CHOLECYSTECTOMY      COLONOSCOPY      HYSTERECTOMY      ROTATOR CUFF REPAIR Left       General Information       Row Name 09/13/23 1512          Physical Therapy Time and Intention    Document Type evaluation  -CL     Mode of Treatment individual therapy;physical therapy  -CL       Row Name 09/13/23 1512          General Information    Patient Profile Reviewed yes  -CL     Prior Level of Function independent:;all  household mobility;ADL's;driving  -CL     Existing Precautions/Restrictions oxygen therapy device and L/min  -CL       Row Name 09/13/23 1512          Living Environment    People in Home alone  -CL       Row Name 09/13/23 1512          Home Main Entrance    Number of Stairs, Main Entrance none  -CL       Row Name 09/13/23 1512          Stairs Within Home, Primary    Number of Stairs, Within Home, Primary none  -CL       Row Name 09/13/23 1512          Cognition    Orientation Status (Cognition) oriented x 3;disoriented to;time  couldn't remember the year  -CL       Row Name 09/13/23 1512          Safety Issues, Functional Mobility    Impairments Affecting Function (Mobility) endurance/activity tolerance  -CL               User Key  (r) = Recorded By, (t) = Taken By, (c) = Cosigned By      Initials Name Provider Type    CL Tiffany Montoya, PT Physical Therapist                   Mobility       Row Name 09/13/23 1512          Bed Mobility    Bed Mobility bed mobility (all) activities  -CL     All Activities, [place] (Bed Mobility) independent  -CL       Row Name 09/13/23 1512          Bed-Chair Transfer    Bed-Chair [place] (Transfers) independent  -CL       Row Name 09/13/23 1512          Sit-Stand Transfer    Sit-Stand [place] (Transfers) independent  -CL       Row Name 09/13/23 1512          Gait/Stairs (Locomotion)    [place] Level (Gait) standby assist  -CL     Distance in Feet (Gait) 80'  -CL               User Key  (r) = Recorded By, (t) = Taken By, (c) = Cosigned By      Initials Name Provider Type    Tiffany Peace, PT Physical Therapist                   Obj/Interventions       Row Name 09/13/23 1513          Range of Motion Comprehensive    General Range of Motion bilateral lower extremity ROM WNL  -CL       Row Name 09/13/23 1513          Strength Comprehensive (MMT)    Comment, General Manual Muscle Testing (MMT) Assessment BLEs grossly 4+/5 bilaterally  -CL       Row Name  "09/13/23 1513          Balance    Balance Assessment sitting static balance;sitting dynamic balance;standing static balance;standing dynamic balance  -CL     Static Sitting Balance independent  -CL     Dynamic Sitting Balance independent  -CL     Position, Sitting Balance sitting edge of bed  -CL     Static Standing Balance standby assist  -CL     Dynamic Standing Balance standby assist  -CL     Position/Device Used, Standing Balance unsupported  -CL       Row Name 09/13/23 1513          Sensory Assessment (Somatosensory)    Sensory Assessment (Somatosensory) bilateral LE  -CL     Bilateral LE Sensory Assessment general sensation;other (see comments)  Neuropathic pain in feet but sensation WNL  -CL               User Key  (r) = Recorded By, (t) = Taken By, (c) = Cosigned By      Initials Name Provider Type    Tiffany Peace PT Physical Therapist                   Goals/Plan    No documentation.                  Clinical Impression       Row Name 09/13/23 1514          Pain    Pretreatment Pain Rating 0/10 - no pain  -CL     Posttreatment Pain Rating 0/10 - no pain  -CL       Row Name 09/13/23 1514          Plan of Care Review    Plan of Care Reviewed With patient  -CL     Outcome Evaluation Catalina Moreno is an 83 y.o. female admitted with c/o generalized weakness and dyspnea who is found to have Aflutter with RVR.  PMH: HTN, HLD, CHF Afib, Hypothryoidism, Pacemaker for SSS and GERD.  Pt lives alone in a SSH with no VLADIMIR.  She is typically independent with mobility with SPC or RW - depending on how she feels.  She has no hx of falls x 6 months. She drives 'if I need to\" but son gets her groceries when for her when he isn't working. At time of evaluation, she is A&O x 3.  She is independent with bed mobility and transfers.  She waslks 80' with RW and SBA-mod I.  Pt's heart rate is variable throughout session.  Pt should be safe to discharge home without follow up PT  -CL       Row Name 09/13/23 1514       "    Therapy Assessment/Plan (PT)    Criteria for Skilled Interventions Met (PT) no problems identified which require skilled intervention  -CL     Therapy Frequency (PT) evaluation only  -CL       Row Name 09/13/23 1514          Vital Signs    Pre Systolic BP Rehab 105  -CL     Pre Treatment Diastolic BP 76  -CL     Pretreatment Heart Rate (beats/min) 107  -CL     Intratreatment Heart Rate (beats/min) 152  -CL     Posttreatment Heart Rate (beats/min) 126  -CL     Pre SpO2 (%) 94  -CL     O2 Delivery Pre Treatment room air  -CL       Row Name 09/13/23 1514          Positioning and Restraints    Pre-Treatment Position in bed  -CL     Post Treatment Position bed  -CL     In Bed notified nsg;supine;call light within reach  -CL               User Key  (r) = Recorded By, (t) = Taken By, (c) = Cosigned By      Initials Name Provider Type    Tiffany Peace, PT Physical Therapist                   Outcome Measures       Row Name 09/13/23 1527 09/13/23 0833       How much help from another person do you currently need...    Turning from your back to your side while in flat bed without using bedrails? 4  -CL 4  -HJ    Moving from lying on back to sitting on the side of a flat bed without bedrails? 4  -CL 3  -HJ    Moving to and from a bed to a chair (including a wheelchair)? 4  -CL 3  -HJ    Standing up from a chair using your arms (e.g., wheelchair, bedside chair)? 4  -CL 3  -HJ    Climbing 3-5 steps with a railing? 3  -CL 3  -HJ    To walk in hospital room? 4  -CL 3  -HJ    AM-PAC 6 Clicks Score (PT) 23  -CL 19  -HJ              User Key  (r) = Recorded By, (t) = Taken By, (c) = Cosigned By      Initials Name Provider Type    Noemy Mesa, RN Registered Nurse    Tiffany Peace, PT Physical Therapist                                 Physical Therapy Education       Title: PT OT SLP Therapies (Done)       Topic: Physical Therapy (Done)       Point: Mobility training (Done)       Learning Progress Summary   "           Patient Acceptance, E,TB, VU by CL at 9/13/2023 1527                         Point: Body mechanics (Done)       Learning Progress Summary             Patient Acceptance, E,TB, VU by CL at 9/13/2023 1527                         Point: Precautions (Done)       Learning Progress Summary             Patient Acceptance, E,TB, VU by CL at 9/13/2023 1527                                         User Key       Initials Effective Dates Name Provider Type Discipline     03/02/22 -  Tiffany Montoya, PT Physical Therapist PT                  PT Recommendation and Plan     Plan of Care Reviewed With: patient  Outcome Evaluation: Catalina Moreno is an 83 y.o. female admitted with c/o generalized weakness and dyspnea who is found to have Aflutter with RVR.  PMH: HTN, HLD, CHF Afib, Hypothryoidism, Pacemaker for SSS and GERD.  Pt lives alone in a SSH with no VLADIMIR.  She is typically independent with mobility with SPC or RW - depending on how she feels.  She has no hx of falls x 6 months. She drives 'if I need to\" but son gets her groceries when for her when he isn't working. At time of evaluation, she is A&O x 3.  She is independent with bed mobility and transfers.  She waslks 80' with RW and SBA-mod I.  Pt's heart rate is variable throughout session.  Pt should be safe to discharge home without follow up PT     Time Calculation:   PT Evaluation Complexity  History, PT Evaluation Complexity: 3 or more personal factors and/or comorbidities  Examination of Body Systems (PT Eval Complexity): total of 3 or more elements  Clinical Presentation (PT Evaluation Complexity): evolving  Clinical Decision Making (PT Evaluation Complexity): moderate complexity  Overall Complexity (PT Evaluation Complexity): moderate complexity     PT Charges       Row Name 09/13/23 1528             Time Calculation    Start Time 1316  -CL      Stop Time 1329  -CL      Time Calculation (min) 13 min  -CL      PT Received On 09/13/23  -CL         " Time Calculation- PT    Total Timed Code Minutes- PT 0 minute(s)  -CL                User Key  (r) = Recorded By, (t) = Taken By, (c) = Cosigned By      Initials Name Provider Type    Tiffany Peace, PT Physical Therapist                  Therapy Charges for Today       Code Description Service Date Service Provider Modifiers Qty    12974422192 HC PT EVAL MOD COMPLEXITY 4 9/13/2023 Tiffany Montoya, PT GP 1            PT G-Codes  AM-PAC 6 Clicks Score (PT): 23  PT Discharge Summary  Anticipated Discharge Disposition (PT): home  Reason for Discharge: At baseline function    Tiffany Montoya, PT  9/13/2023

## 2023-09-13 NOTE — PLAN OF CARE
Goal Outcome Evaluation:  Plan of Care Reviewed With: patient           Outcome Evaluation: Patient complains of bilateral lower leg pain. Home medications restarted. Cardiology following with no medication changes at this time. Patient complains of nausea with relief from zofran. PO intake encouraged. Patient remains in afib. HR increases to 120s when ambulating. Patient is independent with all ADLs. No s/sx distress noted at this time. Call light within reach.

## 2023-09-13 NOTE — OUTREACH NOTE
Medical Week 2 Survey      Flowsheet Row Responses   [facility] facility patient discharged from? [facility]   Does the patient have one of the following disease processes/diagnoses(primary or secondary)? Other   Week 2 attempt successful? No   Unsuccessful attempts Attempt 1   Revoke Readmitted            Kellie SCHWAB - Registered Nurse

## 2023-09-13 NOTE — PROGRESS NOTES
Hospitalist Progress Note   Catalina Moreno : 1940 MRN:[id number] LOS:0     Principal Problem: Atrial flutter with rapid ventricular response     Reason for follow up: All the medical problems listed below    Summary     Catalina Moreno is a 83 y.o. female with past medical history of hypertension, hyperlipidemia, CHF, A-fib, hypothyroidism, pacemaker for SSS and GERD who presented to [facility] on 2023 complaining of generalized weakness for the past 4 days with intermitted exertional dyspnea. She reports being out of some of her medications for a few days because her son who is a  was unable to pick them up and she is unable to drive herself to pick them up. Denies fever, chest pain, vomiting or diarrhea.      In the ED, chest x-ray showed-no acute cardiopulmonary findings. EKG showed atrial fibrillation, heart rate 127 beats per minutes.  Vitals on admission, temp 98.3, pulse 122, respirations 16, BP 95/43, SPO2 96% on room air.  All labs unremarkable except glucose 121, respiratory panel negative. Patient received 250 mcg digoxin, 10 mg Cardizem bolus and initiation of Cardizem drip, 500 mL normal saline bolus and [place] in ED. Hospitalist was contacted to admit patient for further care and management.    Assessment / Plan   #Atrial fibrillation with RVR, on eliquis  -Presented with generalized weakness and found to be she has A-fib with RVR.  Cardiology is following and started her on Cardizem drip.  But Cardizem drip is on hold around 6 PM on 2023 because of low blood pressure.  -Currently patient is on digoxin and started on very low-dose metoprolol tartrate 12.5 mg oral twice daily.  Meto Succinate on hold because it is long-acting and patient has soft blood pressure.  -telemonitoring to continue   -on eliquis for now     #HfpEF  -Echo on 2023 showing ejection fraction of 55%  -Telemetry monitoring cardiology is following   -intake output chart  cautious with IV fluid replacement to avoid from volume overload    #Sick sinus syndrome status post pacemaker placement in January 2023  -Cardio on board.  Telemetry monitoring to continue.    #Nausea along with poor oral intake   -poor oral intake x 1 week and there is nausea  -no abdo pain   -zofran 4 mg IV q 4 hr prn     #Hypertension  -Monitor blood pressure and manage accordingly    #Hyperlipidemia  -Statin to continue    #Hypothyroidism  -Continue levothyroxine    #GERD  -PPI    #Peripheral neuropathy/restless leg syndrome  -On gabapentin.  Per the patient, gabapentin is not helpful.  We will try opioid and low-dose intermittently.  We can try clonazepam at low-dose as well if it is resistant.     # Constipation  -started on bowel regimen    Disposition: TBD    Subjective / Review of systems     Review of Systems   Seems to be anxious and upset.  No chest pain no shortness of breath  Feeling nauseated  No vomiting  No abdominal pain bladder issue.   Constipation present    Objective / Physical Exam   Vital signs:  Temp: 97.7 °F (36.5 °C)  BP: 105/76  Heart Rate: 96  Resp: 15  SpO2: 94 %  Weight: 73.5 kg (162 lb 0.6 oz)    Admission Weight: Weight: 69.9 kg (154 lb)  Current Weight: Weight: 73.5 kg (162 lb 0.6 oz)    Input/Output in last 24 hours:    Intake/Output Summary (Last 24 hours) at 9/13/2023 1546  Last data filed at 9/13/2023 1000  Gross per 24 hour   Intake 1480 ml   Output --   Net 1480 ml      Physical Exam   Physical Exam  HENT:      Head: Normocephalic and atraumatic.      Nose: Nose normal.   Eyes:      Extraocular Movements: Extraocular movements intact.      Conjunctiva/sclera: Conjunctivae normal.      Pupils: Pupils are equal, round, and reactive to light.   Cardiovascular:      Rate and Rhythm: Irregular  Pulmonary:      Effort: normal      Breath sounds: normal   Abdominal:      General: Abdomen is flat. Bowel sounds are normal.      Palpations: Abdomen is soft.   Musculoskeletal:          General: Normal range of motion.      Cervical back: Normal range of motion and neck supple.   Skin:     General: Skin is dry.   Neurological:      General: No focal deficit present.      Mental Status: alert.        Radiology and Labs     Results from last 7 days   Lab Units 09/13/23  0255 09/12/23  1436 09/06/23  2321   WBC 10*3/mm3 5.50 8.10 5.70   HEMATOCRIT % 34.5 38.6 31.3*   PLATELETS 10*3/mm3 219 270 198      Results from last 7 days   Lab Units 09/13/23  0255 09/12/23  1436 09/06/23  2321   SODIUM mmol/L 142 139 139   POTASSIUM mmol/L 4.2 4.2 4.1   CHLORIDE mmol/L 108* 104 104   CO2 mmol/L 28.0 23.0 26.0   BUN mg/dL 19 15 16   CREATININE mg/dL 1.08* 0.72 0.93      Current medications   Scheduled Meds: apixaban, 5 mg, Oral, Q12H  atorvastatin, 10 mg, Oral, Nightly  digoxin, 125 mcg, Oral, Daily  docusate sodium, 100 mg, Oral, Daily  gabapentin, 600 mg, Oral, 4x Daily  levothyroxine, 88 mcg, Oral, Q AM  metoprolol tartrate, 12.5 mg, Oral, Q12H  pantoprazole, 40 mg, Oral, Daily  polyethylene glycol, 17 g, Oral, Daily  rOPINIRole, 0.25 mg, Oral, Nightly  senna-docusate sodium, 2 tablet, Oral, BID  sertraline, 50 mg, Oral, Nightly      Continuous Infusions:      Reviewed all other data in the last 24 hours, including but not limited to vitals, labs, microbiology, imaging and pertinent notes from other providers.     Monalisa Lopes MD   [facility]  09/13/23   15:46 EDT

## 2023-09-13 NOTE — CONSULTS
CARDIOLOGY CONSULT:    Catalina Moreno  1940  female  [id number]      Referring Provider: Hospitalist  Reason for Consultation: Weakness shortness of breath and palpitation    Patient Care Team:  Rubio Dover MD as PCP - General (Family Medicine)  Flash Gonzalez MD as Consulting Physician (Cardiology)    Chief complaint weakness shortness of breath and palpitations    Subjective .     History of present illness:  Catalina Moreno is a 83 y.o. female with history of atrial fibrillation coronary artery disease congestive heart failure hypertension hyperlipidemia and sick sinus syndrome status post pacemaker placement presented to the hospital complains of weakness shortness of breath and also palpitations.  Patient said that she missed her medicines of couple of days because her son could not  the medicines.  No complains of any chest pains.  No PND orthopnea.  No dizziness syncope or swelling of the feet.  In the ER patient had an EKG which showed atrial fibrillation with a rate of 120 to 130 bpm she was started on Cardizem drip and admitted to the hospital.  She also received IV digoxin..     Review of Systems   Constitutional: Positive for malaise/fatigue. Negative for fever.   HENT:  Negative for ear pain and nosebleeds.    Eyes:  Negative for blurred vision and double vision.   Cardiovascular:  Positive for palpitations. Negative for chest pain and dyspnea on exertion.   Respiratory:  Positive for shortness of breath. Negative for cough.    Skin:  Negative for rash.   Musculoskeletal:  Negative for joint pain.   Gastrointestinal:  Negative for abdominal pain, nausea and vomiting.   Neurological:  Negative for focal weakness and headaches.   Psychiatric/Behavioral:  Negative for depression. The patient is not nervous/anxious.    All other systems reviewed and are negative.    History  Past Medical History:   Diagnosis Date    Acute kidney injury 07/22/2022    Anxiety     Arthritis      Atrial fibrillation     paroxysmal    Broken shoulder     left-- due to fall 11-7-19 was at [facility]    Chronic systolic CHF (congestive heart failure) 01/05/2023    EF 36-40%    Coronary artery disease involving native coronary artery of native heart without angina pectoris 04/02/2021    nonobstructive    DDD (degenerative disc disease), lumbar     Depression     Edema     9/2020 foot    GERD (gastroesophageal reflux disease)     H/O fall     Heart failure with mid-range ejection fraction 03/05/2022    EF 45% per 2D echo    Hypertension     Hypothyroidism     Insomnia     Low back pain     Moderate mitral regurgitation 1/5/2023    Neuropathy     Pain in both feet     PONV (postoperative nausea and vomiting)     Pulmonary hypertension     Radiculopathy     Restless legs     Spondylolisthesis     Stage 3a chronic kidney disease     Urinary incontinence        Past Surgical History:   Procedure Laterality Date    BACK SURGERY  07/19/2018    PDC &  PSF L3-L5 insitu    CARDIAC CATHETERIZATION N/A 4/2/2021    Procedure: Cardiac Catheterization/Vascular Study;  Surgeon: Barrett Evans MD;  Location:  [facility];  Service: Cardiovascular;  Laterality: N/A;    CARDIAC ELECTROPHYSIOLOGY PROCEDURE N/A 1/17/2023    Procedure: Pacemaker DC new;  Surgeon: Baljeet Valero MD;  Location:  [facility];  Service: Cardiovascular;  Laterality: N/A;    CHOLECYSTECTOMY      COLONOSCOPY      HYSTERECTOMY      ROTATOR CUFF REPAIR Left        Family History   Problem Relation Age of Onset    Cancer Father     Diabetes Son     Cancer Paternal Aunt     Heart disease Paternal Aunt     Cancer Paternal Uncle        Social History     Tobacco Use    Smoking status: Never    Smokeless tobacco: Never   Vaping Use    Vaping Use: Never used   Substance Use Topics    Alcohol use: No    Drug use: Never        Medications Prior to Admission   Medication Sig Dispense Refill Last Dose    acetaminophen  (TYLENOL) 325 MG tablet Take 2 tablets by mouth Every 6 (Six) Hours As Needed for Mild Pain. Indications: Pain       apixaban (ELIQUIS) 5 MG tablet tablet Take 1 tablet by mouth Every 12 (Twelve) Hours. Indications: Atrial Fibrillation 60 tablet 2     atorvastatin (LIPITOR) 10 MG tablet Take 1 tablet by mouth Daily.       loperamide (IMODIUM A-D) 2 MG tablet Take 1 tablet by mouth 4 (Four) Times a Day As Needed for Diarrhea. Indications: Diarrhea       metoprolol succinate XL (TOPROL-XL) 25 MG 24 hr tablet Take 1 tablet by mouth Daily. 30 tablet 0     ondansetron (ZOFRAN) 4 MG tablet Take 1 tablet by mouth Every 8 (Eight) Hours As Needed for Nausea or Vomiting. Indications: Nausea and Vomiting       rOPINIRole (REQUIP) 0.25 MG tablet Take 1 tablet by mouth Every Night. Take 1 hour before bedtime.  Indications: Restless Leg Syndrome 30 tablet 0     sacubitril-valsartan (Entresto) 24-26 MG tablet Take 1 tablet by mouth 2 (Two) Times a Day. 60 tablet 0     traZODone (DESYREL) 50 MG tablet Take 0.5 tablets by mouth Daily As Needed (Takes at night time as needed).       digoxin (LANOXIN) 125 MCG tablet Take 1 tablet by mouth Daily.       gabapentin (NEURONTIN) 600 MG tablet Take 1 tablet by mouth 4 (Four) Times a Day. Indications: Restless Leg Syndrome, pain       levothyroxine (SYNTHROID, LEVOTHROID) 88 MCG tablet Take 1 tablet by mouth Daily. Indications: Underactive Thyroid       Lidocaine 4 % Place 1 patch on the skin as directed by provider Daily. Remove & Discard patch within 12 hours or as directed by MD       sertraline (ZOLOFT) 50 MG tablet Take 1 tablet by mouth Every Night. Indications: Major Depressive Disorder       sildenafil (REVATIO) 20 MG tablet Take 1 tablet by mouth 3 (Three) Times a Day. Indications: Raynaud's Disease          Allergies: Baclofen, Codeine, Ibuprofen, Methocarbamol, Naproxen, Tizanidine hcl, and Tolmetin    Scheduled Meds:apixaban, 5 mg, Oral, Q12H  atorvastatin, 10 mg, Oral,  "Nightly  digoxin, 125 mcg, Oral, Daily  docusate sodium, 100 mg, Oral, Daily  levothyroxine, 88 mcg, Oral, Q AM  pantoprazole, 40 mg, Oral, Daily  polyethylene glycol, 17 g, Oral, Daily  rOPINIRole, 0.25 mg, Oral, Nightly  senna-docusate sodium, 2 tablet, Oral, BID  sertraline, 50 mg, Oral, Nightly      Continuous Infusions:dilTIAZem, 5-15 mg/hr, Last Rate: Stopped (09/12/23 1855)      PRN Meds:.  acetaminophen **OR** acetaminophen **OR** acetaminophen    aluminum-magnesium hydroxide-simethicone    senna-docusate sodium **AND** polyethylene glycol **AND** bisacodyl **AND** bisacodyl    Magnesium Cardiology Dose Replacement - Follow Nurse / BPA Driven Protocol    melatonin    ondansetron **OR** ondansetron    ondansetron    Potassium Replacement - Follow Nurse / BPA Driven Protocol    [COMPLETED] Insert Peripheral IV **AND** sodium chloride    Objective     VITAL SIGNS  Vitals:    09/13/23 0427 09/13/23 0741 09/13/23 1056 09/13/23 1116   BP: 120/62 100/61 114/67    BP Location: Right arm Right arm Right arm    Patient Position: Lying Lying Lying    Pulse: 94 112 77 76   Resp: 16 16 15    Temp: 98.1 °F (36.7 °C) 97.4 °F (36.3 °C) 97.7 °F (36.5 °C)    TempSrc: Oral Oral Oral    SpO2: 94% 94% 92%    Weight:       Height:           Flowsheet Rows      Flowsheet Row First Filed Value   Admission Height 160 cm (63\") Documented at 09/12/2023 1413   Admission Weight 69.9 kg (154 lb) Documented at 09/12/2023 1413             TELEMETRY: Atrial fibrillation with rapid ventricular response    Physical Exam:  Constitutional:       Appearance: Well-developed.   Eyes:      General: No scleral icterus.     Conjunctiva/sclera: Conjunctivae normal.      Pupils: Pupils are equal, round, and reactive to light.   HENT:      Head: Normocephalic and atraumatic.   Neck:      Vascular: No carotid bruit or JVD.   Pulmonary:      Effort: Pulmonary effort is normal.      Breath sounds: Normal breath sounds. No wheezing. No rales. "   Cardiovascular:      Normal rate. Irregularly irregular rhythm.   Pulses:     Intact distal pulses.   Abdominal:      General: Bowel sounds are normal.      Palpations: Abdomen is soft.   Musculoskeletal: Normal range of motion.      Cervical back: Normal range of motion and neck supple. Skin:     General: Skin is warm and dry.      Findings: No rash.   Neurological:      Mental Status: Alert.      Comments: No focal deficits        Results Review:   I reviewed the patient's new clinical results.  Lab Results (last 24 hours)       Procedure Component Value Units Date/Time    Basic Metabolic Panel [[id number]]  (Abnormal) Collected: 09/13/23 0255    Specimen: Blood Updated: 09/13/23 0350     Glucose 106 mg/dL      BUN 19 mg/dL      Creatinine 1.08 mg/dL      Sodium 142 mmol/L      Potassium 4.2 mmol/L      Chloride 108 mmol/L      CO2 28.0 mmol/L      Calcium 8.3 mg/dL      BUN/Creatinine Ratio 17.6     Anion Gap 6.0 mmol/L      eGFR 51.4 mL/min/1.73     Narrative:      GFR Normal >60  Chronic Kidney Disease <60  Kidney Failure <15    The GFR formula is only valid for adults with stable renal function between ages 18 and 70.    CBC Auto Differential [[id number]]  (Abnormal) Collected: 09/13/23 0255    Specimen: Blood Updated: 09/13/23 0333     WBC 5.50 10*3/mm3      RBC 4.03 10*6/mm3      Hemoglobin 11.0 g/dL      Hematocrit 34.5 %      MCV 85.5 fL      MCH 27.4 pg      MCHC 32.0 g/dL      RDW 17.8 %      RDW-SD 56.4 fl      MPV 8.6 fL      Platelets 219 10*3/mm3      Neutrophil % 49.8 %      Lymphocyte % 35.5 %      Monocyte % 11.4 %      Eosinophil % 2.3 %      Basophil % 1.0 %      Neutrophils, Absolute 2.80 10*3/mm3      Lymphocytes, Absolute 2.00 10*3/mm3      Monocytes, Absolute 0.60 10*3/mm3      Eosinophils, Absolute 0.10 10*3/mm3      Basophils, Absolute 0.10 10*3/mm3      nRBC 0.1 /100 WBC     High Sensitivity Troponin T 2Hr [[id number]]  (Abnormal) Collected: 09/12/23 1650    Specimen: Blood from Arm, Right  Updated: 09/12/23 1712     HS Troponin T 10 ng/L      Troponin T Delta 1 ng/L     Narrative:      High Sensitive Troponin T Reference Range:  <10.0 ng/L- Negative Female for AMI  <15.0 ng/L- Negative Male for AMI  >=10 - Abnormal Female indicating possible myocardial injury.  >=15 - Abnormal Male indicating possible myocardial injury.   Clinicians would have to utilize clinical acumen, EKG, Troponin, and serial changes to determine if it is an Acute Myocardial Infarction or myocardial injury due to an underlying chronic condition.         Extra Tubes [[id number]] Collected: 09/12/23 1436    Specimen: Blood, Venous Line Updated: 09/12/23 1545    Narrative:      The following orders were created for panel order Extra Tubes.  Procedure                               Abnormality         Status                     ---------                               -----------         ------                     Gold Top - SST[[id number]]                                   Final result               Light Blue Top[[id number]]                                   Final result                 Please view results for these tests on the individual orders.    Gold Top - SST [[id number]] Collected: 09/12/23 1436    Specimen: Blood Updated: 09/12/23 1545     Extra Tube Hold for add-ons.     Comment: Auto resulted.       Light Blue Top [[id number]] Collected: 09/12/23 1436    Specimen: Blood Updated: 09/12/23 1545     Extra Tube Hold for add-ons.     Comment: Auto resulted       Respiratory Panel PCR w/COVID-19(SARS-CoV-2) [facility] In-House, NP Swab in UTM/VTM, 3-4 HR TAT - Swab, Nasopharynx [[id number]]  (Normal) Collected: 09/12/23 1436    Specimen: Swab from Nasopharynx Updated: 09/12/23 1529     ADENOVIRUS, PCR Not Detected     Coronavirus 229E Not Detected     Coronavirus HKU1 Not Detected     Coronavirus NL63 Not Detected     Coronavirus OC43 Not Detected     COVID19 Not Detected     Human Metapneumovirus Not Detected     Human  Rhinovirus/Enterovirus Not Detected     Influenza A PCR Not Detected     Influenza B PCR Not Detected     Parainfluenza Virus 1 Not Detected     Parainfluenza Virus 2 Not Detected     Parainfluenza Virus 3 Not Detected     Parainfluenza Virus 4 Not Detected     RSV, PCR Not Detected     Bordetella pertussis pcr Not Detected     Bordetella parapertussis PCR Not Detected     Chlamydophila pneumoniae PCR Not Detected     Mycoplasma pneumo by PCR Not Detected    Narrative:      In the setting of a positive respiratory panel with a viral infection PLUS a negative procalcitonin without other underlying concern for bacterial infection, consider observing off antibiotics or discontinuation of antibiotics and continue supportive care. If the respiratory panel is positive for atypical bacterial infection (Bordetella pertussis, Chlamydophila pneumoniae, or Mycoplasma pneumoniae), consider antibiotic de-escalation to target atypical bacterial infection.    Digoxin Level [[id number]]  (Abnormal) Collected: 09/12/23 1436    Specimen: Blood Updated: 09/12/23 1517     Digoxin <0.30 ng/mL     Comprehensive Metabolic Panel [[id number]]  (Abnormal) Collected: 09/12/23 1436    Specimen: Blood Updated: 09/12/23 1510     Glucose 121 mg/dL      BUN 15 mg/dL      Creatinine 0.72 mg/dL      Sodium 139 mmol/L      Potassium 4.2 mmol/L      Chloride 104 mmol/L      CO2 23.0 mmol/L      Calcium 9.5 mg/dL      Total Protein 7.2 g/dL      Albumin 4.2 g/dL      ALT (SGPT) 8 U/L      AST (SGOT) 11 U/L      Alkaline Phosphatase 97 U/L      Total Bilirubin 1.0 mg/dL      Globulin 3.0 gm/dL      A/G Ratio 1.4 g/dL      BUN/Creatinine Ratio 20.8     Anion Gap 12.0 mmol/L      eGFR 83.6 mL/min/1.73     Narrative:      GFR Normal >60  Chronic Kidney Disease <60  Kidney Failure <15    The GFR formula is only valid for adults with stable renal function between ages 18 and 70.    High Sensitivity Troponin T [[id number]]  (Normal) Collected: 09/12/23 1436     Specimen: Blood Updated: 09/12/23 1510     HS Troponin T 9 ng/L     Narrative:      High Sensitive Troponin T Reference Range:  <10.0 ng/L- Negative Female for AMI  <15.0 ng/L- Negative Male for AMI  >=10 - Abnormal Female indicating possible myocardial injury.  >=15 - Abnormal Male indicating possible myocardial injury.   Clinicians would have to utilize clinical acumen, EKG, Troponin, and serial changes to determine if it is an Acute Myocardial Infarction or myocardial injury due to an underlying chronic condition.         BNP [[id number]]  (Normal) Collected: 09/12/23 1436    Specimen: Blood Updated: 09/12/23 1510     proBNP 998.5 pg/mL     Narrative:      Among patients with dyspnea, NT-proBNP is highly sensitive for the detection of acute congestive heart failure. In addition NT-proBNP of <300 pg/ml effectively rules out acute congestive heart failure with 99% negative predictive value.      CBC & Differential [[id number]]  (Abnormal) Collected: 09/12/23 1436    Specimen: Blood Updated: 09/12/23 1443    Narrative:      The following orders were created for panel order CBC & Differential.  Procedure                               Abnormality         Status                     ---------                               -----------         ------                     CBC Auto Differential[[id number]]        Abnormal            Final result                 Please view results for these tests on the individual orders.    CBC Auto Differential [[id number]]  (Abnormal) Collected: 09/12/23 1436    Specimen: Blood Updated: 09/12/23 1443     WBC 8.10 10*3/mm3      RBC 4.52 10*6/mm3      Hemoglobin 12.6 g/dL      Hematocrit 38.6 %      MCV 85.5 fL      MCH 27.9 pg      MCHC 32.6 g/dL      RDW 17.3 %      RDW-SD 51.2 fl      MPV 8.2 fL      Platelets 270 10*3/mm3      Neutrophil % 73.6 %      Lymphocyte % 18.1 %      Monocyte % 6.5 %      Eosinophil % 0.5 %      Basophil % 1.3 %      Neutrophils, Absolute 6.00 10*3/mm3       Lymphocytes, Absolute 1.50 10*3/mm3      Monocytes, Absolute 0.50 10*3/mm3      Eosinophils, Absolute 0.00 10*3/mm3      Basophils, Absolute 0.10 10*3/mm3      nRBC 0.0 /100 WBC             Imaging Results (Last 24 Hours)       Procedure Component Value Units Date/Time    XR Chest 1 View [[id number]] Collected: 09/12/23 1457     Updated: 09/12/23 1500    Narrative:      XR CHEST 1 VW    Date of Exam: 9/12/2023 2:52 PM EDT    Indication: sob    Comparison: 1/17/2023    Findings:  Left subclavian dual-lead cardiac stimulator device again noted. No focal pulmonary consolidations are evident. Pulmonary vascularity appears within normal limits. No pneumothorax or large effusion identified. Cardiomegaly again noted.      Impression:      Impression:  No acute cardiopulmonary findings.      Electronically Signed: Quan Cazares MD    9/12/2023 2:58 PM EDT    Workstation ID: [id number]            EKG      I personally viewed and interpreted the patient's EKG/Telemetry data:    ECHOCARDIOGRAM:  Results for orders placed during the hospital encounter of 09/05/23    Adult Transthoracic Echo Complete W/ Cont if Necessary Per Protocol    Interpretation Summary    Left ventricular ejection fraction appears to be 51 - 55%.    The right ventricular cavity is moderately dilated.    The left atrial cavity is moderately dilated.    The right atrial cavity is mildly  dilated.    There is calcification of the aortic valve.    Moderate aortic valve regurgitation is present.    Estimated right ventricular systolic pressure from tricuspid regurgitation is mildly elevated (35-45 mmHg).         STRESS MYOVIEW:  Results for orders placed during the hospital encounter of 03/31/21    Stress Test With Myocardial Perfusion One Day    Interpretation Summary  · Findings consistent with a normal ECG stress test.  · Left ventricular ejection fraction is hyperdynamic (Calculated EF > 70%). .  · Impressions are consistent with a study indicating uncertain  risk.  · Large apical defect with some reperfusion during the rest images cannot rule out ischemia EF 76% Clinical correlation is recommended.    Electronically signed by KALYANI Tay, 04/01/21, 11:25 AM EDT.       CARDIAC CATHETERIZATION:    Results for orders placed during the hospital encounter of 03/31/21    Cardiac Catheterization/Vascular Study    Narrative  CARDIAC CATHETERIZATION REPORT    DATE OF PROCEDURE: 4/2/2021      INDICATION FOR PROCEDURE: Abnormal stress test    PROCEDURE PERFORMED: Left heart catheterization, coronary angiography,    PROCEDURE COMMENTS:    All the risks and benefits explained with the patient informed consent was obtained from the patient.  Patient was brought to the cardiac catheterization laboratory placed on the table draped and prepped in the usual sterile fashion.  2% lidocaine was used to anesthetize the right groin area.  Using the modified Seldinger technique 5 Albanian sheath was inserted into the right femoral artery.    5 Albanian JL4 catheter was used to perform the selective left coronary angiography    5 Albanian JR4 catheter was used to perform the selective right coronary angiography    Angio-Seal was placed after exchanging five Albanian sheath with six Albanian sheath    Patient tolerated procedure well without any immediate complications      FINDINGS:    1. HEMODYNAMICS:  LV end-diastolic pressure 7 mmHg, /80 mmHg.    2. LEFT VENTRICULOGRAPHY: Not done patient had echocardiogram    3. CORONARY ANGIOGRAPHY:  Dominance right  Left Main: Large-caliber vessel bifurcates into LAD circumflex artery 0% stenosis  LAD: Large-caliber vessel gives rise to couple of medium caliber diagonal arteries multiple septal branches reaches the apex mild luminal irregularities noted less than 10% stenosis  CIRC: Large-caliber vessel gives rise to marginal lateral branches less than 10% stenosis  RCA: Large-caliber dominant artery gives rise to PDA and PL branches less than 5  to 10% stenosis    SUMMARY: No obstructive coronary artery disease    RECOMMENDATIONS: Evaluate for noncardiac causes of symptoms aggressive cardiac risk factor modification       OTHER:         MDM      Atrial fibrillation  Patient presented with palpitations and has atrial fibrillation with rapid response  Patient is started on Cardizem and digoxin  Patient is currently on metoprolol digoxin and Eliquis at home which we will restart and watch her blood pressure and heart rate  Patient is being ruled out for MI by EKG and enzymes    Coronary disease  Patient has very minimal coronary disease and is currently stable on medications    Congestive heart failure  Patient has heart failure with reduced ejection fraction the past but with metoprolol and Entresto her LV function has improved and EF is about 55 to 60% but will continue the current medicines    Hypertension  Patient blood pressure is actually stable on both metoprolol and Entresto    Hyperlipidemia  Patient is on statins and the lipid levels are followed by the primary care doctor.    I discussed the patients findings and my recommendations with patient and nurse    Flash Gonzalez MD  09/13/23  12:32 EDT

## 2023-09-13 NOTE — PLAN OF CARE
Problem: Adult Inpatient Plan of Care  Goal: Absence of Hospital-Acquired Illness or Injury  Intervention: Identify and Manage Fall Risk  Description: Perform standard risk assessment on admission using a validated tool or comprehensive approach appropriate to the patient; reassess fall risk frequently, with change in status or transfer to another level of care.  Communicate fall injury risk to interprofessional healthcare team.  Determine need for increased observation, equipment and environmental modification, such as low bed, signage and supportive, nonskid footwear.  Adjust safety measures to individual developmental age, stage and identified risk factors.  Reinforce the importance of safety and physical activity with patient and family.  Perform regular intentional rounding to assess need for position change, pain assessment and personal needs, including assistance with toileting.  Recent Flowsheet Documentation  Taken 9/13/2023 0400 by Kyle Urbina, RN  Safety Promotion/Fall Prevention:   activity supervised   assistive device/personal items within reach   clutter free environment maintained   fall prevention program maintained   gait belt   safety round/check completed   room organization consistent  Taken 9/13/2023 0200 by Kyle Urbina, RN  Safety Promotion/Fall Prevention:   activity supervised   assistive device/personal items within reach   clutter free environment maintained   fall prevention program maintained   gait belt   safety round/check completed   room organization consistent  Taken 9/13/2023 0000 by Kyle Urbina, RN  Safety Promotion/Fall Prevention:   activity supervised   assistive device/personal items within reach   clutter free environment maintained   fall prevention program maintained   gait belt   room organization consistent   safety round/check completed   Goal Outcome Evaluation:      Patient admitted from ER during the night.  Cardizem drip was discontinued prior to patient  arriving to floor.  Heart rate and blood pressure WNL.  Patient is alert and oriented.  Lives at home alone.  She is unsure of her home medications so pharmacy needs to be called this morning for medication list.

## 2023-09-14 ENCOUNTER — ANESTHESIA (OUTPATIENT)
Dept: GASTROENTEROLOGY | Facility: HOSPITAL | Age: 83
DRG: 309 | End: 2023-09-14
Payer: MEDICARE

## 2023-09-14 ENCOUNTER — HOME CARE VISIT (OUTPATIENT)
Dept: HOME HEALTH SERVICES | Facility: HOME HEALTHCARE | Age: 83
End: 2023-09-14
Payer: MEDICARE

## 2023-09-14 ENCOUNTER — ANESTHESIA EVENT (OUTPATIENT)
Dept: GASTROENTEROLOGY | Facility: HOSPITAL | Age: 83
DRG: 309 | End: 2023-09-14
Payer: MEDICARE

## 2023-09-14 ENCOUNTER — INPATIENT HOSPITAL (AMBULATORY)
Dept: URBAN - METROPOLITAN AREA HOSPITAL 84 | Facility: HOSPITAL | Age: 83
End: 2023-09-14
Payer: COMMERCIAL

## 2023-09-14 DIAGNOSIS — R10.13 EPIGASTRIC PAIN: ICD-10-CM

## 2023-09-14 DIAGNOSIS — K29.70 GASTRITIS, UNSPECIFIED, WITHOUT BLEEDING: ICD-10-CM

## 2023-09-14 DIAGNOSIS — R11.0 NAUSEA: ICD-10-CM

## 2023-09-14 DIAGNOSIS — K31.7 POLYP OF STOMACH AND DUODENUM: ICD-10-CM

## 2023-09-14 PROBLEM — R10.10 PAIN OF UPPER ABDOMEN: Status: ACTIVE | Noted: 2023-09-12

## 2023-09-14 PROCEDURE — 99232 SBSQ HOSP IP/OBS MODERATE 35: CPT

## 2023-09-14 PROCEDURE — 43235 EGD DIAGNOSTIC BRUSH WASH: CPT | Performed by: INTERNAL MEDICINE

## 2023-09-14 PROCEDURE — 25010000002 PROPOFOL 500 MG/50ML EMULSION: Performed by: NURSE ANESTHETIST, CERTIFIED REGISTERED

## 2023-09-14 PROCEDURE — 0DJ08ZZ INSPECTION OF UPPER INTESTINAL TRACT, VIA NATURAL OR ARTIFICIAL OPENING ENDOSCOPIC: ICD-10-PCS | Performed by: INTERNAL MEDICINE

## 2023-09-14 RX ORDER — IPRATROPIUM BROMIDE AND ALBUTEROL SULFATE 2.5; .5 MG/3ML; MG/3ML
3 SOLUTION RESPIRATORY (INHALATION) ONCE AS NEEDED
Status: DISCONTINUED | OUTPATIENT
Start: 2023-09-14 | End: 2023-09-14 | Stop reason: HOSPADM

## 2023-09-14 RX ORDER — LABETALOL HYDROCHLORIDE 5 MG/ML
5 INJECTION, SOLUTION INTRAVENOUS
Status: DISCONTINUED | OUTPATIENT
Start: 2023-09-14 | End: 2023-09-14 | Stop reason: HOSPADM

## 2023-09-14 RX ORDER — METOPROLOL SUCCINATE 25 MG/1
25 TABLET, EXTENDED RELEASE ORAL
Status: DISCONTINUED | OUTPATIENT
Start: 2023-09-14 | End: 2023-09-15 | Stop reason: HOSPADM

## 2023-09-14 RX ORDER — SUCRALFATE 1 G/1
1 TABLET ORAL
Status: DISCONTINUED | OUTPATIENT
Start: 2023-09-14 | End: 2023-09-15 | Stop reason: HOSPADM

## 2023-09-14 RX ORDER — HYDRALAZINE HYDROCHLORIDE 20 MG/ML
5 INJECTION INTRAMUSCULAR; INTRAVENOUS
Status: DISCONTINUED | OUTPATIENT
Start: 2023-09-14 | End: 2023-09-14 | Stop reason: HOSPADM

## 2023-09-14 RX ORDER — PANTOPRAZOLE SODIUM 40 MG/1
40 TABLET, DELAYED RELEASE ORAL
Status: DISCONTINUED | OUTPATIENT
Start: 2023-09-14 | End: 2023-09-15 | Stop reason: HOSPADM

## 2023-09-14 RX ORDER — LIDOCAINE HYDROCHLORIDE 10 MG/ML
INJECTION, SOLUTION EPIDURAL; INFILTRATION; INTRACAUDAL; PERINEURAL AS NEEDED
Status: DISCONTINUED | OUTPATIENT
Start: 2023-09-14 | End: 2023-09-14 | Stop reason: SURG

## 2023-09-14 RX ORDER — SODIUM CHLORIDE 9 MG/ML
INJECTION, SOLUTION INTRAVENOUS CONTINUOUS PRN
Status: DISCONTINUED | OUTPATIENT
Start: 2023-09-14 | End: 2023-09-14 | Stop reason: SURG

## 2023-09-14 RX ORDER — EPHEDRINE SULFATE 5 MG/ML
5 INJECTION INTRAVENOUS ONCE AS NEEDED
Status: DISCONTINUED | OUTPATIENT
Start: 2023-09-14 | End: 2023-09-14 | Stop reason: HOSPADM

## 2023-09-14 RX ORDER — PROPOFOL 10 MG/ML
INJECTION, EMULSION INTRAVENOUS AS NEEDED
Status: DISCONTINUED | OUTPATIENT
Start: 2023-09-14 | End: 2023-09-14 | Stop reason: SURG

## 2023-09-14 RX ORDER — GUAR GUM
1 PACKET (EA) ORAL DAILY
Status: DISCONTINUED | OUTPATIENT
Start: 2023-09-14 | End: 2023-09-15 | Stop reason: HOSPADM

## 2023-09-14 RX ORDER — METOCLOPRAMIDE 5 MG/1
5 TABLET ORAL
Status: DISCONTINUED | OUTPATIENT
Start: 2023-09-14 | End: 2023-09-15 | Stop reason: HOSPADM

## 2023-09-14 RX ORDER — METOCLOPRAMIDE 5 MG/1
5 TABLET ORAL
Status: DISCONTINUED | OUTPATIENT
Start: 2023-09-14 | End: 2023-09-14

## 2023-09-14 RX ADMIN — SENNOSIDES AND DOCUSATE SODIUM 2 TABLET: 50; 8.6 TABLET ORAL at 16:06

## 2023-09-14 RX ADMIN — LEVOTHYROXINE SODIUM 88 MCG: 0.09 TABLET ORAL at 05:20

## 2023-09-14 RX ADMIN — METOCLOPRAMIDE 5 MG: 5 TABLET ORAL at 16:06

## 2023-09-14 RX ADMIN — SUCRALFATE 1 G: 1 TABLET ORAL at 16:05

## 2023-09-14 RX ADMIN — SODIUM CHLORIDE: 9 INJECTION, SOLUTION INTRAVENOUS at 14:51

## 2023-09-14 RX ADMIN — SERTRALINE 50 MG: 50 TABLET, FILM COATED ORAL at 20:53

## 2023-09-14 RX ADMIN — GABAPENTIN 600 MG: 600 TABLET, FILM COATED ORAL at 20:54

## 2023-09-14 RX ADMIN — Medication 1 PACKET: at 16:07

## 2023-09-14 RX ADMIN — DOCUSATE SODIUM 100 MG: 100 CAPSULE, LIQUID FILLED ORAL at 16:05

## 2023-09-14 RX ADMIN — PANTOPRAZOLE SODIUM 40 MG: 40 TABLET, DELAYED RELEASE ORAL at 16:05

## 2023-09-14 RX ADMIN — PROPOFOL INJECTABLE EMULSION 20 MG: 10 INJECTION, EMULSION INTRAVENOUS at 15:01

## 2023-09-14 RX ADMIN — Medication 10 ML: at 20:54

## 2023-09-14 RX ADMIN — DIGOXIN 125 MCG: 125 TABLET ORAL at 11:22

## 2023-09-14 RX ADMIN — APIXABAN 5 MG: 5 TABLET, FILM COATED ORAL at 08:02

## 2023-09-14 RX ADMIN — Medication 5 MG: at 22:52

## 2023-09-14 RX ADMIN — GABAPENTIN 600 MG: 600 TABLET, FILM COATED ORAL at 08:02

## 2023-09-14 RX ADMIN — APIXABAN 5 MG: 5 TABLET, FILM COATED ORAL at 20:53

## 2023-09-14 RX ADMIN — ROPINIROLE HYDROCHLORIDE 0.25 MG: 0.25 TABLET, FILM COATED ORAL at 20:53

## 2023-09-14 RX ADMIN — METOCLOPRAMIDE 5 MG: 5 TABLET ORAL at 20:53

## 2023-09-14 RX ADMIN — GABAPENTIN 600 MG: 600 TABLET, FILM COATED ORAL at 16:06

## 2023-09-14 RX ADMIN — METOPROLOL SUCCINATE 25 MG: 25 TABLET, EXTENDED RELEASE ORAL at 16:06

## 2023-09-14 RX ADMIN — ACETAMINOPHEN 650 MG: 650 SOLUTION ORAL at 22:52

## 2023-09-14 RX ADMIN — ATORVASTATIN CALCIUM 10 MG: 10 TABLET, FILM COATED ORAL at 20:53

## 2023-09-14 RX ADMIN — LIDOCAINE HYDROCHLORIDE 40 MG: 10 INJECTION, SOLUTION EPIDURAL; INFILTRATION; INTRACAUDAL; PERINEURAL at 15:00

## 2023-09-14 RX ADMIN — METOCLOPRAMIDE 5 MG: 5 TABLET ORAL at 11:22

## 2023-09-14 RX ADMIN — PROPOFOL INJECTABLE EMULSION 50 MG: 10 INJECTION, EMULSION INTRAVENOUS at 15:00

## 2023-09-14 RX ADMIN — METOPROLOL TARTRATE 25 MG: 25 TABLET, FILM COATED ORAL at 08:04

## 2023-09-14 RX ADMIN — POLYETHYLENE GLYCOL 3350 17 G: 17 POWDER, FOR SOLUTION ORAL at 16:05

## 2023-09-14 RX ADMIN — GABAPENTIN 600 MG: 600 TABLET, FILM COATED ORAL at 11:22

## 2023-09-14 RX ADMIN — HYDROCODONE BITARTRATE AND ACETAMINOPHEN 1 TABLET: 5; 325 TABLET ORAL at 20:53

## 2023-09-14 RX ADMIN — PANTOPRAZOLE SODIUM 40 MG: 40 TABLET, DELAYED RELEASE ORAL at 08:02

## 2023-09-14 RX ADMIN — HYDROCODONE BITARTRATE AND ACETAMINOPHEN 1 TABLET: 5; 325 TABLET ORAL at 13:10

## 2023-09-14 NOTE — ANESTHESIA PREPROCEDURE EVALUATION
Anesthesia Evaluation     Patient summary reviewed and Nursing notes reviewed   history of anesthetic complications:  PONV  NPO Solid Status: > 8 hours  NPO Liquid Status: > 8 hours           Airway   Mallampati: II  TM distance: >3 FB  Neck ROM: full  No difficulty expected  Dental - normal exam     Pulmonary - normal exam   Cardiovascular - normal exam    ECG reviewed    (+) hypertension, valvular problems/murmurs, CAD, dysrhythmias Atrial Fib, CHF       Neuro/Psych  (+) numbness, psychiatric history Anxiety and Depression  GI/Hepatic/Renal/Endo    (+) GERD, renal disease, thyroid problem     Musculoskeletal     (+) myalgias  Abdominal  - normal exam    Bowel sounds: normal.   Substance History      OB/GYN          Other   arthritis,     ROS/Med Hx Other: Sinus bradycardia  Prolonged IN interval  Nonspecific T abnrm, anterolateral leads      Left ventricular ejection fraction appears to be 36 - 40%.    There is calcification of the aortic valve.    Moderate mitral valve regurgitation is present.    Estimated right ventricular systolic pressure from tricuspid regurgitation is normal (<35 mmHg).    No pericardial effusion noted      IMPRESSION:  Impression:  Stable chronic findings without acute process.                  Anesthesia Plan    ASA 3     general     intravenous induction     Anesthetic plan, risks, benefits, and alternatives have been provided, discussed and informed consent has been obtained with: patient.    Plan discussed with CRNA.      CODE STATUS:    Level Of Support Discussed With: Patient  Code Status (Patient has no pulse and is not breathing): CPR (Attempt to Resuscitate)  Medical Interventions (Patient has pulse or is breathing): Full Support

## 2023-09-14 NOTE — CASE MANAGEMENT/SOCIAL WORK
Continued Stay Note  KADEEM Rios     Patient Name: Catalina Moreno  MRN: [id number]  Today's Date: 9/14/2023    Admit Date: 9/12/2023    Plan: Return home,current with [facility]   Discharge Plan       Row Name 09/14/23 1143       Plan    Plan Comments Barrier to D/C: G.I. consult for Nausea-EGD planned.               Expected Discharge Date and Time       Expected Discharge Date Expected Discharge Time    Sep 15, 2023               Suzie Foreman RN

## 2023-09-14 NOTE — OP NOTE
ESOPHAGOGASTRODUODENOSCOPY  Procedure Report    Patient Name:  Catalina Moreno  YOB: 1940    Date of Surgery:  9/14/2023     Indications: Nausea  Poor appetite  Epigastric pain    Pre-op Diagnosis:   Nausea [R11.0]  Pain of upper abdomen [R10.10]         Post-op Diagnosis:  1 cm semipedunculated inflammatory polyp in the gastric antrum  Mild nonerosive gastritis      Procedure(s):  ESOPHAGOGASTRODUODENOSCOPY    Staff:  Surgeon(s):  Ulysses Lamas MD         Anesthesia: Monitored Anesthesia Care    Estimated Blood Loss: None  Implants:    Nothing was implanted during the procedure    Specimen:          None    Complications:  No immediate    Description of Procedure:  Informed consent was obtained from the patient.  They were placed in the left lateral decubitus position and IV conscious sedation was administered by anesthesia while being monitored continuously throughout the procedure.  The video Lippes endoscope was introduced into the esophagus and was advanced under direct vision to the second portion of the duodenum which is normal as was the bulb there is no scalloping or endoscopic signs of celiac disease noted in the first and second portion of the duodenum.  The bulb was normal.  The pylorus is patent.  There is a 1 cm semipedunculated inflammatory polyp in the prepyloric antrum that is friable.  Since she is anticoagulated today, we cannot remove it.  The remaining antrum shows some mild nonerosive gastritis.  The body fundus and cardia were normal including retroflexion views.  The squamocolumnar lines at the GE junction without evidence of erosive esophagitis or stricturing.  The remaining esophagus was normal.  The scope was removed and she tolerated the procedure well returned to recovery in stable condition.    Impression:  Nonerosive gastritis  Inflammatory antral polyp    Recommendations:  Call for any postop pain fever bleeding  Healthy heart diet  Reinstitute PPI therapy  twice daily and metoclopramide 5 mg p.o. 4 times daily ACHS, add Carafate 1 g 4 times daily ACHS as a bile gastritis is in the differential  Placed on bowel regimen she does have some constipation and uses narcotics which could contribute to nausea, will utilize MiraLAX and Benefiber  We will follow        Ulysses Lamas MD     Date: 9/14/2023  Time: 15:11 EDT

## 2023-09-14 NOTE — PROGRESS NOTES
CARDIOLOGY PROGRESS NOTE:    Catalina Moreno  83 y.o.  female  1940  [id number]      Referring Provider: Hospitalist    Reason for follow-up: Weakness, shortness of breath, palpitations     Patient Care Team:  Rubio Dover MD as PCP - General (Family Medicine)  Flash Gonzalez MD as Consulting Physician (Cardiology)    Subjective  Patient seen and examined.  Labs and chart reviewed.  Patient is doing well today from cardiology standpoint and currently denies chest pain, shortness of breath, palpitations.  Heart rate better controlled with restart of home medications.  Patient complaining of nausea and abdominal pain with no vomiting, GI consulted.    Objective patient lying in bed resting comfortably on room air     Review of Systems   Constitutional: Negative for chills, fever and malaise/fatigue.   Cardiovascular:  Negative for chest pain, leg swelling, palpitations and syncope.   Respiratory:  Negative for cough and shortness of breath.    Gastrointestinal:  Positive for abdominal pain and nausea. Negative for diarrhea and vomiting.   Neurological:  Negative for dizziness, headaches, light-headedness and weakness.     Allergies: Baclofen, Codeine, Ibuprofen, Methocarbamol, Naproxen, Tizanidine hcl, and Tolmetin    Scheduled Meds:apixaban, 5 mg, Oral, Q12H  atorvastatin, 10 mg, Oral, Nightly  digoxin, 125 mcg, Oral, Daily  docusate sodium, 100 mg, Oral, Daily  gabapentin, 600 mg, Oral, 4x Daily  levothyroxine, 88 mcg, Oral, Q AM  metoclopramide, 5 mg, Oral, TID AC  metoprolol tartrate, 25 mg, Oral, Q12H  pantoprazole, 40 mg, Oral, BID AC  polyethylene glycol, 17 g, Oral, Daily  rOPINIRole, 0.25 mg, Oral, Nightly  senna-docusate sodium, 2 tablet, Oral, BID  sertraline, 50 mg, Oral, Nightly      Continuous Infusions:   PRN Meds:.  acetaminophen **OR** acetaminophen **OR** acetaminophen    aluminum-magnesium hydroxide-simethicone    senna-docusate sodium **AND** polyethylene glycol **AND** bisacodyl  "**AND** bisacodyl    HYDROcodone-acetaminophen    Magnesium Cardiology Dose Replacement - Follow Nurse / BPA Driven Protocol    melatonin    ondansetron **OR** ondansetron    ondansetron    Potassium Replacement - Follow Nurse / BPA Driven Protocol    [COMPLETED] Insert Peripheral IV **AND** sodium chloride    traZODone        VITAL SIGNS  Vitals:    09/14/23 0759 09/14/23 1122 09/14/23 1123 09/14/23 1130   BP: 132/71  122/53 126/61   BP Location: Right arm  Right arm Right arm   Patient Position: Lying  Sitting Lying   Pulse: 111 83 83 84   Resp: 16  20 18   Temp: 97.4 °F (36.3 °C)  98.4 °F (36.9 °C) 98 °F (36.7 °C)   TempSrc: Oral  Oral Oral   SpO2: 98%  97% 94%   Weight:       Height:           Flowsheet Rows      Flowsheet Row First Filed Value   Admission Height 160 cm (63\") Documented at 09/12/2023 1413   Admission Weight 69.9 kg (154 lb) Documented at 09/12/2023 1413             TELEMETRY: Atrial fibrillation with controlled ventricular response    Physical Exam:  Vitals reviewed.   Constitutional:       Appearance: Not in distress.   Eyes:      Pupils: Pupils are equal, round, and reactive to light.   HENT:    Mouth/Throat:      Pharynx: Oropharynx is clear.   Pulmonary:      Effort: Pulmonary effort is normal.      Breath sounds: Normal breath sounds.   Cardiovascular:      Normal rate. Irregularly irregular rhythm.   Pulses:     Intact distal pulses.   Edema:     Peripheral edema absent.   Abdominal:      General: Bowel sounds are normal.   Musculoskeletal: Normal range of motion.      Cervical back: Normal range of motion and neck supple. Skin:     General: Skin is warm.   Neurological:      General: No focal deficit present.      Mental Status: Alert.        Results Review:   I reviewed the patient's new clinical results.  Lab Results (last 24 hours)       ** No results found for the last 24 hours. **            Imaging Results (Last 24 Hours)       ** No results found for the last 24 hours. **      "       EKG      I personally viewed and interpreted the patient's EKG/Telemetry data:    ECHOCARDIOGRAM:  Results for orders placed during the hospital encounter of 09/05/23    Adult Transthoracic Echo Complete W/ Cont if Necessary Per Protocol    Interpretation Summary    Left ventricular ejection fraction appears to be 51 - 55%.    The right ventricular cavity is moderately dilated.    The left atrial cavity is moderately dilated.    The right atrial cavity is mildly  dilated.    There is calcification of the aortic valve.    Moderate aortic valve regurgitation is present.    Estimated right ventricular systolic pressure from tricuspid regurgitation is mildly elevated (35-45 mmHg).       STRESS MYOVIEW:  Results for orders placed during the hospital encounter of 03/31/21    Stress Test With Myocardial Perfusion One Day    Interpretation Summary  · Findings consistent with a normal ECG stress test.  · Left ventricular ejection fraction is hyperdynamic (Calculated EF > 70%). .  · Impressions are consistent with a study indicating uncertain risk.  · Large apical defect with some reperfusion during the rest images cannot rule out ischemia EF 76% Clinical correlation is recommended.    Electronically signed by KALYANI Tay, 04/01/21, 11:25 AM EDT.       CARDIAC CATHETERIZATION:  Results for orders placed during the hospital encounter of 03/31/21    Cardiac Catheterization/Vascular Study    Narrative  CARDIAC CATHETERIZATION REPORT    DATE OF PROCEDURE: 4/2/2021      INDICATION FOR PROCEDURE: Abnormal stress test    PROCEDURE PERFORMED: Left heart catheterization, coronary angiography,    PROCEDURE COMMENTS:    All the risks and benefits explained with the patient informed consent was obtained from the patient.  Patient was brought to the cardiac catheterization laboratory placed on the table draped and prepped in the usual sterile fashion.  2% lidocaine was used to anesthetize the right groin area.  Using the  modified Seldinger technique 5 [place] sheath was inserted into the right femoral artery.    5 [place] JL4 catheter was used to perform the selective left coronary angiography    5 [place] JR4 catheter was used to perform the selective right coronary angiography    Angio-Seal was placed after exchanging five [place] sheath with six [place]ib[place] sheath    Patient tolerated procedure well without any immediate complications      FINDINGS:    1. HEMODYNAMICS:  LV end-diastolic pressure 7 mmHg, /80 mmHg.    2. LEFT VENTRICULOGRAPHY: Not done patient had echocardiogram    3. CORONARY ANGIOGRAPHY:  Dominance right  Left Main: Large-caliber vessel bifurcates into LAD circumflex artery 0% stenosis  LAD: Large-caliber vessel gives rise to couple of medium caliber diagonal arteries multiple septal branches reaches the apex mild luminal irregularities noted less than 10% stenosis  CIRC: Large-caliber vessel gives rise to marginal lateral branches less than 10% stenosis  RCA: Large-caliber dominant artery gives rise to PDA and PL branches less than 5 to 10% stenosis    SUMMARY: No obstructive coronary artery disease    RECOMMENDATIONS: Evaluate for noncardiac causes of symptoms aggressive cardiac risk factor modification       OTHER:         Assessment & Plan     Principal Problem:    Atrial flutter with rapid ventricular response  Active Problems:    Atrial fibrillation with RVR    Nausea    Pain of upper abdomen       Atrial fibrillation  Sick sinus syndrome s/p pacemaker implantation  Patient presented with palpitations and atrial fibrillation with rapid response  Patient is currently on metoprolol tartrate, digoxin, and Eliquis at home   Medications restarted and patient heart rate no well controlled  Patient likely recently noncompliant at home with medications as she states time usually picks up medications and is a  and has been busy  Digoxin level less than 0.30  Patient underwent dual-chamber permanent  pacemaker implantation  2023  Pacemaker working well    Coronary disease  Most recent cardiac catheterization from 2021 shows nonobstructive coronary disease  Patient currently denies chest pain  Patient is on medical therapy to include beta-blocker, statin     Congestive heart failure with preserved ejection fraction  Patient has history of HFrEF with EF of 36 to 40% in January but most recent echocardiogram shows improved LVEF of 51 to 55% with moderate aortic valve regurgitation and moderately dilated RV  Patient is on home GDMT to include metoprolol succinate, Entresto  Will change patient current beta-blocker from Lopressor to succinate for heart failure  Patient could benefit from SGL 2- I, will initiate following GI work-up    Hypertension  Blood pressures currently stable  Patient is on metoprolol     Hyperlipidemia  Patient is on statin therapy  Most recent lipid panel within normal limits    Nausea  GI consulted   Planning for EGD today   Patient started on bowel regimen     I discussed the patients findings and my recommendations with patient and nurse    Demetrice Gupta, KALYANI  09/14/23  11:49 EDT

## 2023-09-14 NOTE — PROGRESS NOTES
Hospitalist Progress Note   Catalina Moreno : 1940 MRN:[id number] LOS:1     Principal Problem: Atrial flutter with rapid ventricular response     Reason for follow up: All the medical problems listed below    Summary     Catalina Moreno is a 83 y.o. female with past medical history of hypertension, hyperlipidemia, CHF, A-fib, hypothyroidism, pacemaker for SSS and GERD who presented to [facility] on 2023 complaining of generalized weakness for the past 4 days with intermitted exertional dyspnea. She is in A fib with RVR and now on cardizem drip. There is constant nausea and plan for EGD per the GI.     Assessment / Plan   #Atrial fibrillation with RVR, on eliquis and cardizem drip   -Presented with generalized weakness and found to be she has A-fib with RVR.  Cardiology is following and started her on Cardizem drip.  But Cardizem drip is on hold around 6 PM on 2023 because of low blood pressure.  -Currently patient is on digoxin and started on very low-dose metoprolol tartrate 12.5 mg oral twice daily.  Meto Succinate on hold because it is long-acting and patient has soft blood pressure.  -telemonitoring to continue   -on eliquis for now     #HfpEF  -Echo on 2023 showing ejection fraction of 55%  -Telemetry monitoring cardiology is following   -intake output chart cautious with IV fluid replacement to avoid from volume overload    #Sick sinus syndrome status post pacemaker placement in 2023  -Cardio on board.  Telemetry monitoring to continue.    #Nausea along with poor oral intake   -poor oral intake x 1 week and there is nausea  -no abdo pain   -zofran 4 mg IV q 4 hr prn   - consult GI --> plan for EGD     #Hypertension  -Monitor blood pressure and manage accordingly    #Hyperlipidemia  -Statin to continue    #Hypothyroidism  -Continue levothyroxine    #GERD  -PPI    #Peripheral neuropathy/restless leg syndrome  -On gabapentin.  Per the patient,  gabapentin is not helpful.  We will try opioid and low-dose intermittently.  We can try clonazepam at low-dose as well if it is resistant.     # Constipation  -started on bowel regimen    Disposition: TBD    Subjective / Review of systems     Review of Systems   Feeling the same.    No chest pain no shortness of breath  Feeling nauseated  No vomiting  No abdominal pain bladder issue.   Constipation present    Objective / Physical Exam   Vital signs:  Temp: 98 °F (36.7 °C)  BP: 126/61  Heart Rate: 84  Resp: 18  SpO2: 94 %  Weight: 72.8 kg (160 lb 7.9 oz)    Admission Weight: Weight: 69.9 kg (154 lb)  Current Weight: Weight: 72.8 kg (160 lb 7.9 oz)    Input/Output in last 24 hours:    Intake/Output Summary (Last 24 hours) at 9/14/2023 1405  Last data filed at 9/14/2023 1228  Gross per 24 hour   Intake 780 ml   Output 0 ml   Net 780 ml        Physical Exam   Physical Exam  HENT:      Head: Normocephalic and atraumatic.      Nose: Nose normal.   Eyes:      Extraocular Movements: Extraocular movements intact.      Conjunctiva/sclera: Conjunctivae normal.      Pupils: Pupils are equal, round, and reactive to light.   Cardiovascular:      Rate and Rhythm: Irregular  Pulmonary:      Effort: normal      Breath sounds: normal   Abdominal:      General: Abdomen is flat. Bowel sounds are normal.      Palpations: Abdomen is soft.   Musculoskeletal:         General: Normal range of motion.      Cervical back: Normal range of motion and neck supple.   Skin:     General: Skin is dry.   Neurological:      General: No focal deficit present.      Mental Status: alert.        Radiology and Labs     Results from last 7 days   Lab Units 09/13/23  0255 09/12/23  1436   WBC 10*3/mm3 5.50 8.10   HEMATOCRIT % 34.5 38.6   PLATELETS 10*3/mm3 219 270        Results from last 7 days   Lab Units 09/13/23  0255 09/12/23  1436   SODIUM mmol/L 142 139   POTASSIUM mmol/L 4.2 4.2   CHLORIDE mmol/L 108* 104   CO2 mmol/L 28.0 23.0   BUN mg/dL 19 15    CREATININE mg/dL 1.08* 0.72        Current medications   Scheduled Meds: apixaban, 5 mg, Oral, Q12H  atorvastatin, 10 mg, Oral, Nightly  digoxin, 125 mcg, Oral, Daily  docusate sodium, 100 mg, Oral, Daily  gabapentin, 600 mg, Oral, 4x Daily  levothyroxine, 88 mcg, Oral, Q AM  metoclopramide, 5 mg, Oral, TID AC  metoprolol succinate XL, 25 mg, Oral, Q24H  pantoprazole, 40 mg, Oral, BID AC  polyethylene glycol, 17 g, Oral, Daily  rOPINIRole, 0.25 mg, Oral, Nightly  senna-docusate sodium, 2 tablet, Oral, BID  sertraline, 50 mg, Oral, Nightly      Continuous Infusions:      Reviewed all other data in the last 24 hours, including but not limited to vitals, labs, microbiology, imaging and pertinent notes from other providers.     Monalisa Lopes MD   [facility]  09/14/23   14:05 EDT

## 2023-09-14 NOTE — CONSULTS
GI CONSULT  NOTE:    Referring Provider:  Dr. Lopes    Chief complaint: Nausea and weakness    Subjective .     History of present illness: Patient is an 83-year-old female with history of hypertension, CHF, A-fib on Eliquis, sick sinus syndrome status post pacemaker, cholecystectomy, and chronic pain on narcotics.  She presented to the hospital on 9/12/2023 with complaints of generalized weakness.  Found to have A-fib with RVR.  She had been out of some of her medications.  Now that these have been restarted her rate has been controlled.  GI has recently consulted on 9/6/2023 when she was here.  She did complain of some abdominal pain and nausea at that time as well.  She started PPI and Reglan and symptoms improved so EGD was not performed.  She is unsure if she has been taking the Reglan at home.  Her nausea comes and goes.  This morning it returned after she had eaten a few bites of breakfast.  She denies any vomiting.  This does not always occur with eating though.  She can have epigastric pain as well.  She denies heartburn or dysphagia.  She moves her bowels every 2 to 3 days with some constipation.  She does notice improvement in her symptoms after she has a good bowel movement.  On previous admission she did have mildly elevated liver enzymes which are now normal.  She had a weakly positive smooth muscle antibody but otherwise serologies unremarkable including negative JESSICA.  She also had weakly positive TTG.    Endo History:  Patient reports more recent colonoscopy by Dr. Ortiz in [place].  9/2019 colonoscopy (Dr. Schaffer) -polyp (TA), 1.2 cm polyp not removed due to anticoagulation, grade 2 internal hemorrhoids  7/2012 EGD -bile gastritis    Past Medical History:  Past Medical History:   Diagnosis Date    Acute kidney injury 07/22/2022    Anxiety     Arthritis     Atrial fibrillation     paroxysmal    Broken shoulder     left-- due to fall 11-7-19 was at [facility]    Chronic systolic CHF (congestive heart failure)  01/05/2023    EF 36-40%    Coronary artery disease involving native coronary artery of native heart without angina pectoris 04/02/2021    nonobstructive    DDD (degenerative disc disease), lumbar     Depression     Edema     9/2020 foot    GERD (gastroesophageal reflux disease)     H/O fall     Heart failure with mid-range ejection fraction 03/05/2022    EF 45% per 2D echo    Hypertension     Hypothyroidism     Insomnia     Low back pain     Moderate mitral regurgitation 1/5/2023    Neuropathy     Pain in both feet     PONV (postoperative nausea and vomiting)     Pulmonary hypertension     Radiculopathy     Restless legs     Spondylolisthesis     Stage 3a chronic kidney disease     Urinary incontinence        Past Surgical History:  Past Surgical History:   Procedure Laterality Date    BACK SURGERY  07/19/2018    PDC &  PSF L3-L5 insitu    CARDIAC CATHETERIZATION N/A 4/2/2021    Procedure: Cardiac Catheterization/Vascular Study;  Surgeon: Barrett Evans MD;  Location: [facility] CATH INVASIVE LOCATION;  Service: Cardiovascular;  Laterality: N/A;    CARDIAC ELECTROPHYSIOLOGY PROCEDURE N/A 1/17/2023    Procedure: Pacemaker DC new;  Surgeon: Baljeet Valero MD;  Location: [facility] CATH INVASIVE LOCATION;  Service: Cardiovascular;  Laterality: N/A;    CHOLECYSTECTOMY      COLONOSCOPY      HYSTERECTOMY      ROTATOR CUFF REPAIR Left        Social History:  Social History     Tobacco Use    Smoking status: Never    Smokeless tobacco: Never   Vaping Use    Vaping Use: Never used   Substance Use Topics    Alcohol use: No    Drug use: Never       Family History:  Family History   Problem Relation Age of Onset    Cancer Father     Diabetes Son     Cancer Paternal Aunt     Heart disease Paternal Aunt     Cancer Paternal Uncle        Medications:  Medications Prior to Admission   Medication Sig Dispense Refill Last Dose    acetaminophen (TYLENOL) 325 MG tablet Take 2 tablets by mouth Every 6 (Six) Hours As Needed for  Mild Pain. Indications: Pain       apixaban (ELIQUIS) 5 MG tablet tablet Take 1 tablet by mouth Every 12 (Twelve) Hours. Indications: Atrial Fibrillation 60 tablet 2     atorvastatin (LIPITOR) 10 MG tablet Take 1 tablet by mouth Daily.       loperamide (IMODIUM A-D) 2 MG tablet Take 1 tablet by mouth 4 (Four) Times a Day As Needed for Diarrhea. Indications: Diarrhea       metoprolol succinate XL (TOPROL-XL) 25 MG 24 hr tablet Take 1 tablet by mouth Daily. 30 tablet 0     ondansetron (ZOFRAN) 4 MG tablet Take 1 tablet by mouth Every 8 (Eight) Hours As Needed for Nausea or Vomiting. Indications: Nausea and Vomiting       rOPINIRole (REQUIP) 0.25 MG tablet Take 1 tablet by mouth Every Night. Take 1 hour before bedtime.  Indications: Restless Leg Syndrome 30 tablet 0     sacubitril-valsartan (Entresto) 24-26 MG tablet Take 1 tablet by mouth 2 (Two) Times a Day. 60 tablet 0     traZODone (DESYREL) 50 MG tablet Take 0.5 tablets by mouth Daily As Needed (Takes at night time as needed). Indications: Trouble Sleeping       digoxin (LANOXIN) 125 MCG tablet Take 1 tablet by mouth Daily.       gabapentin (NEURONTIN) 600 MG tablet Take 1 tablet by mouth 4 (Four) Times a Day. Indications: Restless Leg Syndrome, pain       levothyroxine (SYNTHROID, LEVOTHROID) 88 MCG tablet Take 1 tablet by mouth Daily. Indications: Underactive Thyroid       Lidocaine 4 % Place 1 patch on the skin as directed by provider Daily. Remove & Discard patch within 12 hours or as directed by MD  Indications: pain       sertraline (ZOLOFT) 50 MG tablet Take 1 tablet by mouth Every Night. Indications: Major Depressive Disorder       sildenafil (REVATIO) 20 MG tablet Take 1 tablet by mouth 3 (Three) Times a Day. Indications: Pulmonary Arterial Hypertension          Scheduled Meds:apixaban, 5 mg, Oral, Q12H  atorvastatin, 10 mg, Oral, Nightly  digoxin, 125 mcg, Oral, Daily  docusate sodium, 100 mg, Oral, Daily  gabapentin, 600 mg, Oral, 4x  "Daily  levothyroxine, 88 mcg, Oral, Q AM  metoprolol tartrate, 25 mg, Oral, Q12H  pantoprazole, 40 mg, Oral, Daily  polyethylene glycol, 17 g, Oral, Daily  rOPINIRole, 0.25 mg, Oral, Nightly  senna-docusate sodium, 2 tablet, Oral, BID  sertraline, 50 mg, Oral, Nightly      Continuous Infusions:   PRN Meds:.  acetaminophen **OR** acetaminophen **OR** acetaminophen    aluminum-magnesium hydroxide-simethicone    senna-docusate sodium **AND** polyethylene glycol **AND** bisacodyl **AND** bisacodyl    HYDROcodone-acetaminophen    Magnesium Cardiology Dose Replacement - Follow Nurse / BPA Driven Protocol    melatonin    ondansetron **OR** ondansetron    ondansetron    Potassium Replacement - Follow Nurse / BPA Driven Protocol    [COMPLETED] Insert Peripheral IV **AND** sodium chloride    traZODone    ALLERGIES:  Baclofen, Codeine, Ibuprofen, Methocarbamol, Naproxen, Tizanidine hcl, and Tolmetin    ROS:  Review of Systems   Constitutional:  Positive for appetite change. Negative for chills and fever.   Respiratory:  Negative for cough and shortness of breath.    Cardiovascular:  Negative for chest pain and palpitations.   Gastrointestinal:  Positive for abdominal pain, constipation and nausea. Negative for blood in stool and vomiting.   Neurological:  Positive for weakness. Negative for dizziness.   Psychiatric/Behavioral:  Negative for agitation and confusion.      Objective     Vital Signs:   Visit Vitals  /71 (BP Location: Right arm, Patient Position: Lying)   Pulse 111   Temp 97.4 °F (36.3 °C) (Oral)   Resp 16   Ht 160 cm (63\")   Wt 72.8 kg (160 lb 7.9 oz)   SpO2 98%   BMI 28.43 kg/m²       Physical Exam:      General Appearance:    Awake and alert, in no acute distress   Head:    Normocephalic, without obvious abnormality, atraumatic   Eyes:            Conjunctivae normal, anicteric sclera   Ears:    Ears appear intact with no abnormalities noted   Throat:   No oral lesions, no thrush, oral mucosa moist     "   Lungs:     Respirations regular, even and unlabored       Chest Wall:    No abnormalities observed   Abdomen:     Soft, minimal epigastric tenderness, no rebound or guarding, non-distended, no hepatosplenomegaly   Rectal:     Deferred   Extremities:   No cyanosis, no redness   Pulses:   Pulses palpable and equal bilaterally   Skin:   No bleeding, bruising or rash, no jaundice   Lymph nodes:   No palpable adenopathy   Neurologic:   Sensation intact       Results Review:   I reviewed the patient's labs and imaging.  CBC  Results from last 7 days   Lab Units 09/13/23  0255 09/12/23  1436   RBC 10*6/mm3 4.03 4.52   WBC 10*3/mm3 5.50 8.10   HEMOGLOBIN g/dL 11.0* 12.6   PLATELETS 10*3/mm3 219 270       CMP  Results from last 7 days   Lab Units 09/13/23  0255 09/12/23  1436   SODIUM mmol/L 142 139   POTASSIUM mmol/L 4.2 4.2   CHLORIDE mmol/L 108* 104   CO2 mmol/L 28.0 23.0   BUN mg/dL 19 15   CREATININE mg/dL 1.08* 0.72   GLUCOSE mg/dL 106* 121*   ALBUMIN g/dL  --  4.2   BILIRUBIN mg/dL  --  1.0   ALK PHOS U/L  --  97   AST (SGOT) U/L  --  11   ALT (SGPT) U/L  --  8       Amylase and Lipase        CRP         Imaging Results (Last 24 Hours)       ** No results found for the last 24 hours. **              ASSESSMENT AND PLAN:  83-year-old female presented to the hospital with generalized weakness.  Found to have A-fib/RVR due to recently being out of some of her medications.  Has ongoing but intermittent nausea and poor appetite.  Symptoms improved with Reglan and PPI.    Nausea/poor appetite  Epigastric pain  Constipation  A-fib/RVR on admission -on Eliquis  Chronic pain on narcotics  CHF  Sick sinus syndrome status post pacemaker  History of cholecystectomy  Weakly positive TTG    Principal Problem:    Atrial flutter with rapid ventricular response  Active Problems:    Atrial fibrillation with RVR     Plan:  Patient admitted to the hospital 2 days ago with generalized weakness.  Had been out of some of her medications  and found to have A-fib with RVR on admission.  She has history of A-fib and takes Eliquis.  Her rate is now controlled.  She complains of intermittent epigastric pain as well as nausea and poor appetite but she denies any vomiting.  Her symptoms are not always associated with eating.  She has been evaluated by gastroenterology service twice prior to this.  EGD has been mentioned but not performed.  We will plan to proceed with EGD today to evaluate for peptic ulcer disease or erosive gastritis.  She had mildly elevated liver enzymes during her recent admission.  Noncontrasted CT showed dilated CBD post Temitope but no acute findings.  Smooth muscle antibody weakly positive at 27 otherwise liver serologies unremarkable.  Her liver enzymes are now normal.  Lipase has been normal on recent admission.  Could also consider her symptoms related to gastroparesis and constipation related to narcotics, but she does not report taking these daily.  We will plan to start bowel regimen following EGD.  She has been started on PPI and this will be increased to twice daily.  Also add low-dose Reglan back.  Supportive care.    I discussed the patients findings and my recommendations with the patient.  Demetrice Foley, KALYANI  09/14/23  11:05 EDT

## 2023-09-14 NOTE — PLAN OF CARE
Goal Outcome Evaluation:      Improving. No complaint of note. Call light within reach

## 2023-09-15 ENCOUNTER — INPATIENT HOSPITAL (AMBULATORY)
Dept: URBAN - METROPOLITAN AREA HOSPITAL 84 | Facility: HOSPITAL | Age: 83
End: 2023-09-15
Payer: COMMERCIAL

## 2023-09-15 ENCOUNTER — HOME CARE VISIT (OUTPATIENT)
Dept: HOME HEALTH SERVICES | Facility: HOME HEALTHCARE | Age: 83
End: 2023-09-15
Payer: MEDICARE

## 2023-09-15 ENCOUNTER — TRANSCRIBE ORDERS (OUTPATIENT)
Dept: HOME HEALTH SERVICES | Facility: HOME HEALTHCARE | Age: 83
End: 2023-09-15
Payer: MEDICARE

## 2023-09-15 VITALS
HEIGHT: 63 IN | BODY MASS INDEX: 30.08 KG/M2 | OXYGEN SATURATION: 95 % | SYSTOLIC BLOOD PRESSURE: 103 MMHG | RESPIRATION RATE: 13 BRPM | TEMPERATURE: 98.2 F | WEIGHT: 169.75 LBS | HEART RATE: 85 BPM | DIASTOLIC BLOOD PRESSURE: 55 MMHG

## 2023-09-15 DIAGNOSIS — I48.91 ATRIAL FIBRILLATION, UNSPECIFIED TYPE: Primary | ICD-10-CM

## 2023-09-15 DIAGNOSIS — R11.0 NAUSEA: ICD-10-CM

## 2023-09-15 DIAGNOSIS — R63.0 ANOREXIA: ICD-10-CM

## 2023-09-15 LAB
ALBUMIN SERPL-MCNC: 3.9 G/DL (ref 3.5–5.2)
ALBUMIN/GLOB SERPL: 1.4 G/DL
ALP SERPL-CCNC: 104 U/L (ref 39–117)
ALT SERPL W P-5'-P-CCNC: 13 U/L (ref 1–33)
ANION GAP SERPL CALCULATED.3IONS-SCNC: 7 MMOL/L (ref 5–15)
AST SERPL-CCNC: 28 U/L (ref 1–32)
BASOPHILS # BLD AUTO: 0.1 10*3/MM3 (ref 0–0.2)
BASOPHILS NFR BLD AUTO: 1.2 % (ref 0–1.5)
BILIRUB SERPL-MCNC: 0.4 MG/DL (ref 0–1.2)
BUN SERPL-MCNC: 22 MG/DL (ref 8–23)
BUN/CREAT SERPL: 22.9 (ref 7–25)
CALCIUM SPEC-SCNC: 8.2 MG/DL (ref 8.6–10.5)
CHLORIDE SERPL-SCNC: 106 MMOL/L (ref 98–107)
CO2 SERPL-SCNC: 29 MMOL/L (ref 22–29)
CREAT SERPL-MCNC: 0.96 MG/DL (ref 0.57–1)
DEPRECATED RDW RBC AUTO: 53.8 FL (ref 37–54)
EGFRCR SERPLBLD CKD-EPI 2021: 59.2 ML/MIN/1.73
EOSINOPHIL # BLD AUTO: 0.2 10*3/MM3 (ref 0–0.4)
EOSINOPHIL NFR BLD AUTO: 3.5 % (ref 0.3–6.2)
ERYTHROCYTE [DISTWIDTH] IN BLOOD BY AUTOMATED COUNT: 17.4 % (ref 12.3–15.4)
GLOBULIN UR ELPH-MCNC: 2.8 GM/DL
GLUCOSE SERPL-MCNC: 145 MG/DL (ref 65–99)
HCT VFR BLD AUTO: 34.9 % (ref 34–46.6)
HGB BLD-MCNC: 11.1 G/DL (ref 12–15.9)
LIPASE SERPL-CCNC: 14 U/L (ref 13–60)
LYMPHOCYTES # BLD AUTO: 2.4 10*3/MM3 (ref 0.7–3.1)
LYMPHOCYTES NFR BLD AUTO: 38.4 % (ref 19.6–45.3)
MAGNESIUM SERPL-MCNC: 2.1 MG/DL (ref 1.6–2.4)
MCH RBC QN AUTO: 28 PG (ref 26.6–33)
MCHC RBC AUTO-ENTMCNC: 31.9 G/DL (ref 31.5–35.7)
MCV RBC AUTO: 87.8 FL (ref 79–97)
MONOCYTES # BLD AUTO: 0.5 10*3/MM3 (ref 0.1–0.9)
MONOCYTES NFR BLD AUTO: 8.9 % (ref 5–12)
NEUTROPHILS NFR BLD AUTO: 3 10*3/MM3 (ref 1.7–7)
NEUTROPHILS NFR BLD AUTO: 48 % (ref 42.7–76)
NRBC BLD AUTO-RTO: 0.1 /100 WBC (ref 0–0.2)
PHOSPHATE SERPL-MCNC: 3 MG/DL (ref 2.5–4.5)
PLATELET # BLD AUTO: 236 10*3/MM3 (ref 140–450)
PMV BLD AUTO: 8.7 FL (ref 6–12)
POTASSIUM SERPL-SCNC: 4.6 MMOL/L (ref 3.5–5.2)
PROT SERPL-MCNC: 6.7 G/DL (ref 6–8.5)
RBC # BLD AUTO: 3.98 10*6/MM3 (ref 3.77–5.28)
SODIUM SERPL-SCNC: 142 MMOL/L (ref 136–145)
WBC NRBC COR # BLD: 6.2 10*3/MM3 (ref 3.4–10.8)

## 2023-09-15 PROCEDURE — 83690 ASSAY OF LIPASE: CPT | Performed by: NURSE PRACTITIONER

## 2023-09-15 PROCEDURE — 99232 SBSQ HOSP IP/OBS MODERATE 35: CPT | Mod: FS | Performed by: NURSE PRACTITIONER

## 2023-09-15 PROCEDURE — 99232 SBSQ HOSP IP/OBS MODERATE 35: CPT

## 2023-09-15 PROCEDURE — 84100 ASSAY OF PHOSPHORUS: CPT | Performed by: INTERNAL MEDICINE

## 2023-09-15 PROCEDURE — 85025 COMPLETE CBC W/AUTO DIFF WBC: CPT | Performed by: NURSE PRACTITIONER

## 2023-09-15 PROCEDURE — 80053 COMPREHEN METABOLIC PANEL: CPT | Performed by: NURSE PRACTITIONER

## 2023-09-15 PROCEDURE — 83735 ASSAY OF MAGNESIUM: CPT | Performed by: INTERNAL MEDICINE

## 2023-09-15 RX ORDER — PANTOPRAZOLE SODIUM 40 MG/1
40 TABLET, DELAYED RELEASE ORAL
Qty: 60 TABLET | Refills: 0 | Status: ON HOLD | OUTPATIENT
Start: 2023-09-15 | End: 2023-09-18

## 2023-09-15 RX ORDER — SUCRALFATE 1 G/1
1 TABLET ORAL
Qty: 90 TABLET | Refills: 0 | Status: ON HOLD | OUTPATIENT
Start: 2023-09-15 | End: 2023-09-18

## 2023-09-15 RX ORDER — ALUMINA, MAGNESIA, AND SIMETHICONE 2400; 2400; 240 MG/30ML; MG/30ML; MG/30ML
5 SUSPENSION ORAL EVERY 6 HOURS PRN
Qty: 355 ML | Refills: 0 | Status: ON HOLD | OUTPATIENT
Start: 2023-09-15 | End: 2023-09-18

## 2023-09-15 RX ORDER — DOCUSATE SODIUM 100 MG/1
100 CAPSULE, LIQUID FILLED ORAL DAILY
Qty: 30 CAPSULE | Refills: 0 | Status: ON HOLD | OUTPATIENT
Start: 2023-09-16 | End: 2023-09-18

## 2023-09-15 RX ADMIN — GABAPENTIN 600 MG: 600 TABLET, FILM COATED ORAL at 10:57

## 2023-09-15 RX ADMIN — SUCRALFATE 1 G: 1 TABLET ORAL at 10:56

## 2023-09-15 RX ADMIN — GABAPENTIN 600 MG: 600 TABLET, FILM COATED ORAL at 08:35

## 2023-09-15 RX ADMIN — DIGOXIN 125 MCG: 125 TABLET ORAL at 10:57

## 2023-09-15 RX ADMIN — DOCUSATE SODIUM 100 MG: 100 CAPSULE, LIQUID FILLED ORAL at 08:35

## 2023-09-15 RX ADMIN — PANTOPRAZOLE SODIUM 40 MG: 40 TABLET, DELAYED RELEASE ORAL at 08:35

## 2023-09-15 RX ADMIN — LEVOTHYROXINE SODIUM 88 MCG: 0.09 TABLET ORAL at 05:56

## 2023-09-15 RX ADMIN — METOCLOPRAMIDE 5 MG: 5 TABLET ORAL at 10:57

## 2023-09-15 RX ADMIN — HYDROCODONE BITARTRATE AND ACETAMINOPHEN 1 TABLET: 5; 325 TABLET ORAL at 03:42

## 2023-09-15 RX ADMIN — SENNOSIDES AND DOCUSATE SODIUM 2 TABLET: 50; 8.6 TABLET ORAL at 08:35

## 2023-09-15 RX ADMIN — POLYETHYLENE GLYCOL 3350 17 G: 17 POWDER, FOR SOLUTION ORAL at 08:35

## 2023-09-15 RX ADMIN — METOPROLOL SUCCINATE 25 MG: 25 TABLET, EXTENDED RELEASE ORAL at 08:35

## 2023-09-15 RX ADMIN — SUCRALFATE 1 G: 1 TABLET ORAL at 08:35

## 2023-09-15 RX ADMIN — METOCLOPRAMIDE 5 MG: 5 TABLET ORAL at 08:35

## 2023-09-15 RX ADMIN — APIXABAN 5 MG: 5 TABLET, FILM COATED ORAL at 08:35

## 2023-09-15 NOTE — DISCHARGE SUMMARY
[facility]  Discharge Summary    Date of Service: 23  Patient Name: Catalina Moreno  : 1940  MRN: [id number]    Date of Admission: 2023  Discharge Diagnosis: A fib with RVR  Date of Discharge:  9/15/23   Primary Care Physician: Rubio Doevr MD      Presenting Problem:   Atrial fibrillation with rapid ventricular response [I48.91]  Atrial flutter with rapid ventricular response [I48.92]  Serum digoxin level below therapeutic range [R78.89]  Atrial fibrillation with RVR [I48.91]    Active and Resolved Hospital Problems:  Active Hospital Problems    Diagnosis POA    **Atrial flutter with rapid ventricular response [I48.92] Yes    Nausea [R11.0] Unknown    Pain of upper abdomen [R10.10] Unknown    Atrial fibrillation with RVR [I48.91] Yes      Resolved Hospital Problems   No resolved problems to display.         Hospital Course     Hospital Course:  Catalina Moreno is a 83 y.o. female with past medical history of hypertension, hyperlipidemia, CHF, A-fib, hypothyroidism, pacemaker for SSS and GERD who presented to [facility] on 2023 complaining of generalized weakness for the past 4 days with intermitted exertional dyspnea. She is in A fib with RVR and now on cardizem drip. There is constant nausea and EGD has done on 23 and there is nonerosive gastritis and inflammatory antral polyp. She is on PPI, carafate. Her HR has been controlled and she got dc with stable clinical condition.       DISCHARGE Follow Up Recommendations for labs and diagnostics:   Follow up with GI, cardio       Reasons For Change In Medications and Indications for New Medications:      Day of Discharge     Vital Signs:  Temp:  [98 °F (36.7 °C)-98.8 °F (37.1 °C)] 98.2 °F (36.8 °C)  Heart Rate:  [73-91] 85  Resp:  [12-18] 13  BP: (103-142)/(53-60) 103/55    Physical Exam:  Physical Exam   Physical Exam  HENT:      Head: Normocephalic and atraumatic.      Nose:  Nose normal.   Eyes:      Extraocular Movements: Extraocular movements intact.      Conjunctiva/sclera: Conjunctivae normal.      Pupils: Pupils are equal, round, and reactive to light.   Cardiovascular:      Rate and Rhythm: normal       Pulses: Normal pulses.      Heart sounds: Normal heart sounds.   Pulmonary:      Effort: normal      Breath sounds: normal   Abdominal:      General: Abdomen is flat. Bowel sounds are normal.      Palpations: Abdomen is soft.   Musculoskeletal:         General: Normal range of motion.      Cervical back: Normal range of motion and neck supple.   Skin:     General: Skin is dry.   Neurological:      General: No focal deficit present.      Mental Status: alert.   Psychiatric:         Mood and Affect: Mood normal.            Pertinent  and/or Most Recent Results     LAB RESULTS:      Lab 09/15/23  0349 09/13/23  0255 09/12/23  1436   WBC 6.20 5.50 8.10   HEMOGLOBIN 11.1* 11.0* 12.6   HEMATOCRIT 34.9 34.5 38.6   PLATELETS 236 219 270   NEUTROS ABS 3.00 2.80 6.00   LYMPHS ABS 2.40 2.00 1.50   MONOS ABS 0.50 0.60 0.50   EOS ABS 0.20 0.10 0.00   MCV 87.8 85.5 85.5         Lab 09/15/23  0349 09/13/23  0255 09/12/23  1436   SODIUM 142 142 139   POTASSIUM 4.6 4.2 4.2   CHLORIDE 106 108* 104   CO2 29.0 28.0 23.0   ANION GAP 7.0 6.0 12.0   BUN 22 19 15   CREATININE 0.96 1.08* 0.72   EGFR 59.2* 51.4* 83.6   GLUCOSE 145* 106* 121*   CALCIUM 8.2* 8.3* 9.5   MAGNESIUM 2.1  --   --    PHOSPHORUS 3.0  --   --          Lab 09/15/23  0349 09/12/23  1436   TOTAL PROTEIN 6.7 7.2   ALBUMIN 3.9 4.2   GLOBULIN 2.8 3.0   ALT (SGPT) 13 8   AST (SGOT) 28 11   BILIRUBIN 0.4 1.0   ALK PHOS 104 97   LIPASE 14  --          Lab 09/12/23  1650 09/12/23  1436   PROBNP  --  998.5   HSTROP T 10* 9                 Brief Urine Lab Results  (Last result in the past 365 days)        Color   Clarity   Blood   Leuk Est   Nitrite   Protein   CREAT   Urine HCG        09/05/23 1550 Dark Yellow  Comment: Result checked      Clear   Negative   Negative   Negative   Trace                 Microbiology Results (last 10 days)       Procedure Component Value - Date/Time    Respiratory Panel PCR w/COVID-19(SARS-CoV-2) WENDI/MIKAYLA/KEVEN/PAD/COR/MAD/DWIGHT In-House, NP Swab in UTM/VTM, 3-4 HR TAT - Swab, Nasopharynx [[id number]]  (Normal) Collected: 09/12/23 1436    Lab Status: Final result Specimen: Swab from Nasopharynx Updated: 09/12/23 1529     ADENOVIRUS, PCR Not Detected     Coronavirus 229E Not Detected     Coronavirus HKU1 Not Detected     Coronavirus NL63 Not Detected     Coronavirus OC43 Not Detected     COVID19 Not Detected     Human Metapneumovirus Not Detected     Human Rhinovirus/Enterovirus Not Detected     Influenza A PCR Not Detected     Influenza B PCR Not Detected     Parainfluenza Virus 1 Not Detected     Parainfluenza Virus 2 Not Detected     Parainfluenza Virus 3 Not Detected     Parainfluenza Virus 4 Not Detected     RSV, PCR Not Detected     Bordetella pertussis pcr Not Detected     Bordetella parapertussis PCR Not Detected     Chlamydophila pneumoniae PCR Not Detected     Mycoplasma pneumo by PCR Not Detected    Narrative:      In the setting of a positive respiratory panel with a viral infection PLUS a negative procalcitonin without other underlying concern for bacterial infection, consider observing off antibiotics or discontinuation of antibiotics and continue supportive care. If the respiratory panel is positive for atypical bacterial infection (Bordetella pertussis, Chlamydophila pneumoniae, or Mycoplasma pneumoniae), consider antibiotic de-escalation to target atypical bacterial infection.            CT Abdomen Pelvis Without Contrast    Result Date: 9/5/2023  Impression: Impression: No acute CT abnormalities in the abdomen or pelvis Electronically Signed: Jake Song MD  9/5/2023 4:37 PM EDT  Workstation ID: [id number]    XR Chest 1 View    Result Date: 9/12/2023  Impression: Impression: No acute cardiopulmonary  findings. Electronically Signed: Quan Cazares MD  9/12/2023 2:58 PM EDT  Workstation ID: [id number]     Results for orders placed during the hospital encounter of 03/04/22    Duplex Venous Lower Extremity - Bilateral CAR    Interpretation Summary  · Normal bilateral lower extremity venous duplex scan.  · Nonvascular cystic mass located in the left posterior mid calf.      Results for orders placed during the hospital encounter of 03/04/22    Duplex Venous Lower Extremity - Bilateral CAR    Interpretation Summary  · Normal bilateral lower extremity venous duplex scan.  · Nonvascular cystic mass located in the left posterior mid calf.      Results for orders placed during the hospital encounter of 09/05/23    Adult Transthoracic Echo Complete W/ Cont if Necessary Per Protocol    Interpretation Summary    Left ventricular ejection fraction appears to be 51 - 55%.    The right ventricular cavity is moderately dilated.    The left atrial cavity is moderately dilated.    The right atrial cavity is mildly  dilated.    There is calcification of the aortic valve.    Moderate aortic valve regurgitation is present.    Estimated right ventricular systolic pressure from tricuspid regurgitation is mildly elevated (35-45 mmHg).      Labs Pending at Discharge:      Procedures Performed  Procedure(s):  ESOPHAGOGASTRODUODENOSCOPY  09/14 1453 UPPER GI ENDOSCOPY      Consults:   Consults       Date and Time Order Name Status Description    9/14/2023  9:02 AM Inpatient Gastroenterology Consult Completed     9/6/2023  1:04 PM Inpatient Gastroenterology Consult Completed               Discharge Details        Discharge Medications        New Medications        Instructions Start Date   aluminum-magnesium hydroxide-simethicone 400-400-40 MG/5ML suspension  Commonly known as: MAALOX MAX   5 mL, Oral, Every 6 Hours PRN      docusate sodium 100 MG capsule  Commonly known as: COLACE   100 mg, Oral, Daily   Start Date: September 16, 2023      sucralfate 1 g tablet  Commonly known as: CARAFATE   1 g, Oral, 3 Times Daily Before Meals             Changes to Medications        Instructions Start Date   pantoprazole 40 MG EC tablet  Commonly known as: PROTONIX  What changed:   when to take this  additional instructions   40 mg, Oral, 2 Times Daily Before Meals             Continue These Medications        Instructions Start Date   acetaminophen 325 MG tablet  Commonly known as: TYLENOL   2 tablets, Oral, Every 6 Hours PRN      apixaban 5 MG tablet tablet  Commonly known as: ELIQUIS   5 mg, Oral, Every 12 Hours Scheduled      atorvastatin 10 MG tablet  Commonly known as: LIPITOR   10 mg, Oral, Daily      digoxin 125 MCG tablet  Commonly known as: LANOXIN   125 mcg, Oral, Daily Digoxin      Entresto 24-26 MG tablet  Generic drug: sacubitril-valsartan   1 tablet, Oral, 2 Times Daily      gabapentin 600 MG tablet  Commonly known as: NEURONTIN   1 tablet, Oral, 4 Times Daily      levothyroxine 88 MCG tablet  Commonly known as: SYNTHROID, LEVOTHROID   88 mcg, Oral, Daily      Lidocaine 4 %   1 patch, Transdermal, Every 24 Hours, Remove & Discard patch within 12 hours or as directed by MD      loperamide 2 MG tablet  Commonly known as: IMODIUM A-D   2 mg, Oral, 4 Times Daily PRN      metoprolol succinate XL 25 MG 24 hr tablet  Commonly known as: TOPROL-XL   25 mg, Oral, Every 24 Hours      ondansetron 4 MG tablet  Commonly known as: ZOFRAN   1 tablet, Oral, Every 8 Hours PRN      rOPINIRole 0.25 MG tablet  Commonly known as: REQUIP   0.25 mg, Oral, Nightly, Take 1 hour before bedtime.      sertraline 50 MG tablet  Commonly known as: ZOLOFT   50 mg, Oral, Nightly      sildenafil 20 MG tablet  Commonly known as: REVATIO   1 tablet, Oral, 3 Times Daily      traZODone 50 MG tablet  Commonly known as: DESYREL   0.5 tablets, Oral, Daily PRN               Allergies   Allergen Reactions    Baclofen Rash    Codeine Nausea Only    Ibuprofen Unknown (See Comments)      Patient doesn't know----Motin    Methocarbamol Unknown (See Comments)     Patient doesn't know    Naproxen Unknown (See Comments)     Pt. Doesn't know     Tizanidine Hcl Other (See Comments)     Syncope     Tolmetin Dizziness     Pt. Doesn't know-- same as Tolectin         Discharge Disposition:   Home-Health Care Svc    Diet:  Hospital:No active diet order        Discharge Activity:         CODE STATUS:  Code Status and Medical Interventions:   Ordered at: 09/12/23 8130     Level Of Support Discussed With:    Patient     Code Status (Patient has no pulse and is not breathing):    CPR (Attempt to Resuscitate)     Medical Interventions (Patient has pulse or is breathing):    Full Support         Future Appointments   Date Time Provider Department Center   9/16/2023 12:30 PM Reema Shabazz RN [facility]           Time spent on Discharge including face to face service:  35 minutes      Signature: Electronically signed by Monalisa Lopes MD, 09/15/23, 17:48 EDT.  [facility] Hospitalist Team

## 2023-09-15 NOTE — PLAN OF CARE
Goal Outcome Evaluation:      Pt reported feelings of pain on bilateral lower feet. Administered PRN meds. pt verbalized relief of pain. No nausea and vomiting recorded this shift.

## 2023-09-15 NOTE — Clinical Note
Transfer Summary:    · Pt transferred to: [facility]  · Reason for Transfer: Hospital Admission for Atrial Flutter with Rapid Ventricular Reponse  · Report called to: Hospital    · Summary of Care, treatment or services provided to the patient including disciplines seeing the patient: Skilled Nursing  · Patient's progress towards goals: Ongoing  · Communicable Disease yes or no If yes, type: NONE

## 2023-09-15 NOTE — PROGRESS NOTES
LOS: 2 days   Patient Care Team:  Rubio Dover MD as PCP - General (Family Medicine)  Flash Gonzalez MD as Consulting Physician (Cardiology)      Subjective     Interval History:     Subjective: Patient reports that she is feeling much better today.  Tolerating breakfast without nausea or vomiting.  Denies abdominal pain.      ROS:   No chest pain, shortness of breath, or cough.        Medication Review:     Current Facility-Administered Medications:     acetaminophen (TYLENOL) tablet 650 mg, 650 mg, Oral, Q4H PRN, 650 mg at 09/13/23 1116 **OR** acetaminophen (TYLENOL) 160 MG/5ML oral solution 650 mg, 650 mg, Oral, Q4H PRN, 650 mg at 09/14/23 2252 **OR** acetaminophen (TYLENOL) suppository 650 mg, 650 mg, Rectal, Q4H PRN, Cee Brasher, APRN    aluminum-magnesium hydroxide-simethicone (MAALOX MAX) 400-400-40 MG/5ML suspension 15 mL, 15 mL, Oral, Q6H PRN, Cee Brasher APRN    apixaban (ELIQUIS) tablet 5 mg, 5 mg, Oral, Q12H, Cee Brasher APRN, 5 mg at 09/15/23 0835    atorvastatin (LIPITOR) tablet 10 mg, 10 mg, Oral, Nightly, Monalisa Lopes MD, 10 mg at 09/14/23 2053    sennosides-docusate (PERICOLACE) 8.6-50 MG per tablet 2 tablet, 2 tablet, Oral, BID, 2 tablet at 09/15/23 0835 **AND** polyethylene glycol (MIRALAX) packet 17 g, 17 g, Oral, Daily PRN **AND** bisacodyl (DULCOLAX) EC tablet 5 mg, 5 mg, Oral, Daily PRN **AND** bisacodyl (DULCOLAX) suppository 10 mg, 10 mg, Rectal, Daily PRN, Cee Brasher, APRN    digoxin (LANOXIN) tablet 125 mcg, 125 mcg, Oral, Daily, Cee Brasher APRN, 125 mcg at 09/14/23 1122    docusate sodium (COLACE) capsule 100 mg, 100 mg, Oral, Daily, Monalisa Lopes MD, 100 mg at 09/15/23 0835    gabapentin (NEURONTIN) tablet 600 mg, 600 mg, Oral, 4x Daily, Monalisa Lopes MD, 600 mg at 09/15/23 0835    HYDROcodone-acetaminophen (NORCO) 5-325 MG per tablet 1 tablet, 1 tablet, Oral, Q6H PRN, Monalisa Lopes MD, 1 tablet at 09/15/23 0342    levothyroxine (SYNTHROID, LEVOTHROID)  tablet 88 mcg, 88 mcg, Oral, Q AM, Cee Brasher APRN, 88 mcg at 09/15/23 0556    Magnesium Cardiology Dose Replacement - Follow Nurse / BPA Driven Protocol, , Does not apply, PRN, Cee Brasher, APRN    melatonin tablet 5 mg, 5 mg, Oral, Nightly PRN, Cee Brasher APRN, 5 mg at 09/14/23 2252    metoclopramide (REGLAN) tablet 5 mg, 5 mg, Oral, 4x Daily AC & at Bedtime, Demetrice Foley APRN, 5 mg at 09/15/23 0835    metoprolol succinate XL (TOPROL-XL) 24 hr tablet 25 mg, 25 mg, Oral, Q24H, AlonsoDemetrice APRN, 25 mg at 09/15/23 0835    Nut[employer] fiber pack 1 packet, 1 packet, Oral, Daily, Demetrice Foley APRN, 1 packet at 09/14/23 1607    ondansetron (ZOFRAN) tablet 4 mg, 4 mg, Oral, Q6H PRN, 4 mg at 09/13/23 1116 **OR** ondansetron (ZOFRAN) injection 4 mg, 4 mg, Intravenous, Q6H PRN, Cee Brasher APRN, 4 mg at 09/12/23 2204    ondansetron (ZOFRAN) injection 4 mg, 4 mg, Intravenous, Q6H PRN, Monalisa Lopes MD    pantoprazole (PROTONIX) EC tablet 40 mg, 40 mg, Oral, BID AC, Demetrice Foley APRN, 40 mg at 09/15/23 0835    polyethylene glycol (MIRALAX) packet 17 g, 17 g, Oral, Daily, Monalisa Lopes MD, 17 g at 09/15/23 0835    Potassium Replacement - Follow Nurse / BPA Driven Protocol, , Does not apply, PRN, Cee Brasher, APRN    rOPINIRole (REQUIP) tablet 0.25 mg, 0.25 mg, Oral, Nightly, Cee Brasher APRN, 0.25 mg at 09/14/23 2053    sertraline (ZOLOFT) tablet 50 mg, 50 mg, Oral, Nightly, Cee Brasher APRN, 50 mg at 09/14/23 2053    [COMPLETED] Insert Peripheral IV, , , Once **AND** sodium chloride 0.9 % flush 10 mL, 10 mL, Intravenous, PRN, Theodore Overton MD, 10 mL at 09/14/23 2054    sucralfate (CARAFATE) tablet 1 g, 1 g, Oral, TID AC, Demetrice Foley APRN, 1 g at 09/15/23 0835    traZODone (DESYREL) tablet 25 mg, 25 mg, Oral, Nightly PRN, Monalisa Lopes MD      Objective     Vital Signs  Vitals:    09/14/23 2058 09/14/23 2345 09/15/23 0415 09/15/23 0835   BP: 125/55 115/53 138/60 142/57    BP Location: Right arm Right arm Left arm Left arm   Patient Position: Lying Lying Lying Lying   Pulse: 91 79 73 73   Resp: 13 13 12 18   Temp: 98.8 °F (37.1 °C) 98.1 °F (36.7 °C) 98.8 °F (37.1 °C) 98 °F (36.7 °C)   TempSrc: Axillary Oral Oral Oral   SpO2: 94% 93% 94%    Weight:   77 kg (169 lb 12.1 oz)    Height:           Physical Exam:     General Appearance:    Awake and alert, in no acute distress   Head:    Normocephalic, without obvious abnormality   Eyes:          Conjunctivae normal, anicteric sclera   Throat:   No oral lesions, no thrush, oral mucosa moist   Neck:   No adenopathy, supple, no JVD   Lungs:     respirations regular, even and unlabored   Abdomen:     Soft, non-tender, no rebound or guarding, non-distended   Rectal:     Deferred   Extremities:   No edema, no cyanosis   Skin:   No bruising or rash, no jaundice        Results Review:    CBC    Results from last 7 days   Lab Units 09/15/23  0349 09/13/23  0255 09/12/23  1436   WBC 10*3/mm3 6.20 5.50 8.10   HEMOGLOBIN g/dL 11.1* 11.0* 12.6   PLATELETS 10*3/mm3 236 219 270     CMP   Results from last 7 days   Lab Units 09/15/23  0349 09/13/23  0255 09/12/23  1436   SODIUM mmol/L 142 142 139   POTASSIUM mmol/L 4.6 4.2 4.2   CHLORIDE mmol/L 106 108* 104   CO2 mmol/L 29.0 28.0 23.0   BUN mg/dL 22 19 15   CREATININE mg/dL 0.96 1.08* 0.72   GLUCOSE mg/dL 145* 106* 121*   ALBUMIN g/dL 3.9  --  4.2   BILIRUBIN mg/dL 0.4  --  1.0   ALK PHOS U/L 104  --  97   AST (SGOT) U/L 28  --  11   ALT (SGPT) U/L 13  --  8   MAGNESIUM mg/dL 2.1  --   --    PHOSPHORUS mg/dL 3.0  --   --    LIPASE U/L 14  --   --      Cr Clearance Estimated Creatinine Clearance: 44.4 mL/min (by C-G formula based on SCr of 0.96 mg/dL).  Coag     HbA1C   Lab Results   Component Value Date    HGBA1C 5.9 (H) 01/05/2023    HGBA1C 6.0 (H) 11/13/2021    HGBA1C 5.6 06/04/2019     Blood Glucose No results found for: POCGLU  Infection     UA      Radiology(recent) No radiology results for the  last day       Assessment & Plan     ASSESSMENT:  -Nausea/poor appetite  -Epigastric pain  -Constipation  -A-fib/RVR on admission -on Eliquis  -Chronic pain on narcotics  -CHF  -Sick sinus syndrome status post pacemaker  -History of cholecystectomy  -Weakly positive TTG    PLAN:   Patient is an 83-year-old female who presented to the hospital with generalized weakness. Found to have A-fib/RVR due to recently being out of some of her medications. Has ongoing but intermittent nausea and poor appetite. Symptoms improved with Reglan and PPI.     Patient reports that she is feeling better today.  Tolerating breakfast without nausea/vomiting.  No abdominal pain.  S/p EGD yesterday showing nonerosive gastritis and inflammatory antral polyp.  Continue PPI, Carafate, and metoclopramide 5 mg 4 times daily.  Also continue bowel regimen of MiraLAX and Benefiber daily.  Continue diet as tolerated.  She can follow-up in our office in 4 to 6 weeks after discharge.  GI will be available as needed.      Electronically signed by KALYANI Dowell, 09/15/23, 10:12 AM EDT.

## 2023-09-15 NOTE — ANESTHESIA POSTPROCEDURE EVALUATION
Patient: Catalina Moreno    Procedure Summary       Date: 09/14/23 Room / Location: [facility] ENDOSCOPY 1 / [facility] ENDOSCOPY    Anesthesia Start: 1451 Anesthesia Stop: 1511    Procedure: ESOPHAGOGASTRODUODENOSCOPY Diagnosis:       Nausea      Pain of upper abdomen      (Nausea [R11.0])      (Pain of upper abdomen [R10.10])    Surgeons: Ulysses Lamas MD Provider: Misha Reyez MD    Anesthesia Type: general ASA Status: 3            Anesthesia Type: general    Vitals  Vitals Value Taken Time   /56 09/14/23 1540   Temp     Pulse 93 09/14/23 1544   Resp 16 09/14/23 1525   SpO2 95 % 09/14/23 1544   Vitals shown include unvalidated device data.        Post Anesthesia Care and Evaluation    Patient location during evaluation: PACU  Patient participation: complete - patient participated  Level of consciousness: awake  Pain scale: See nurse's notes for pain score.  Pain management: adequate    Airway patency: patent  Anesthetic complications: No anesthetic complications  PONV Status: none  Cardiovascular status: acceptable  Respiratory status: acceptable and spontaneous ventilation  Hydration status: acceptable    Comments: Patient seen and examined postoperatively; vital signs stable; SpO2 greater than or equal to 90%; cardiopulmonary status stable; nausea/vomiting adequately controlled; pain adequately controlled; no apparent anesthesia complications; patient discharged from anesthesia care when discharge criteria were met

## 2023-09-15 NOTE — CONSULTS
Nutrition Services    Patient Name: Catalina Moreno  YOB: 1940  MRN: [id number]  Admission date: 9/12/2023    Comment:    Continue current diet.     PPE Documentation        PPE Worn By Provider gloves   PPE Worn By Patient  N/A     CLINICAL NUTRITION ASSESSMENT      Reason for Assessment 9/15: chronic poor intake, MST: 2      H&P      Past Medical History:   Diagnosis Date    Acute kidney injury 07/22/2022    Anxiety     Arthritis     Atrial fibrillation     paroxysmal    Broken shoulder     left-- due to fall 11-7-19 was at [facility]    Chronic systolic CHF (congestive heart failure) 01/05/2023    EF 36-40%    Coronary artery disease involving native coronary artery of native heart without angina pectoris 04/02/2021    nonobstructive    DDD (degenerative disc disease), lumbar     Depression     Edema     9/2020 foot    GERD (gastroesophageal reflux disease)     H/O fall     Heart failure with mid-range ejection fraction 03/05/2022    EF 45% per 2D echo    Hypertension     Hypothyroidism     Insomnia     Low back pain     Moderate mitral regurgitation 1/5/2023    Neuropathy     Pain in both feet     PONV (postoperative nausea and vomiting)     Pulmonary hypertension     Radiculopathy     Restless legs     Spondylolisthesis     Stage 3a chronic kidney disease     Urinary incontinence        Past Surgical History:   Procedure Laterality Date    BACK SURGERY  07/19/2018    PDC &  PSF L3-L5 insitu    CARDIAC CATHETERIZATION N/A 4/2/2021    Procedure: Cardiac Catheterization/Vascular Study;  Surgeon: Barrett Evans MD;  Location: [facility] CATH INVASIVE LOCATION;  Service: Cardiovascular;  Laterality: N/A;    CARDIAC ELECTROPHYSIOLOGY PROCEDURE N/A 1/17/2023    Procedure: Pacemaker DC new;  Surgeon: Baljeet Valero MD;  Location: [facility] CATH INVASIVE LOCATION;  Service: Cardiovascular;  Laterality: N/A;    CHOLECYSTECTOMY      COLONOSCOPY      HYSTERECTOMY      ROTATOR CUFF REPAIR Left        "  Current Problems   A-fib with RVR  -pt was out of medications    HTN  CHF    N/V/constipation and poor oral intake  -GI following  -EGD showed non-erosive gastritis and inflammatory antral polyup    Anxiety  Restless leg syndrome   GERD       Encounter Information        Trending Narrative     9/15: Pt admitted to [facility] with A-fib with RVR. Pt reported she was out of her medications for a few days. Pt also endorsed nausea and poor PO intake. RD visited pt at bedside. Pt reports her appetite is improving. NFPE completed and not consistent with nutrition diagnosis of malnutrition at this time using AND/ASPEN criteria. Plan for discharge today.     Anthropometrics        Current Height, Weight Height: 160 cm (63\")  Weight: 77 kg (169 lb 12.1 oz) (09/15/23 0415)       Usual Body Weight (UBW) unknown       Trending Weight Hx     This admission: 9/15: 169# - method used to obtain wt not recorded               PTA: Current wt within range of wts recorded since January 2023    Wt Readings from Last 30 Encounters:   09/15/23 0415 77 kg (169 lb 12.1 oz)   09/14/23 0418 72.8 kg (160 lb 7.9 oz)   09/12/23 2150 73.5 kg (162 lb 0.6 oz)   09/12/23 1413 69.9 kg (154 lb)   09/07/23 0353 74.6 kg (164 lb 8 oz)   09/06/23 1815 73.9 kg (162 lb 14.7 oz)   09/05/23 1503 69.9 kg (154 lb)   02/15/23 1427 69.4 kg (153 lb)   01/20/23 0500 76 kg (167 lb 8.8 oz)   01/19/23 0508 75.8 kg (167 lb 1.7 oz)   01/18/23 0500 75.9 kg (167 lb 5.3 oz)   01/17/23 0531 74.6 kg (164 lb 7.4 oz)   01/16/23 0603 73.5 kg (162 lb 0.6 oz)   01/15/23 2239 74.5 kg (164 lb 3.9 oz)   01/15/23 0557 79.3 kg (174 lb 13.2 oz)   01/14/23 2337 77.6 kg (171 lb 1.2 oz)   01/14/23 1927 79.4 kg (175 lb)   01/09/23 0557 79.6 kg (175 lb 7.8 oz)   01/06/23 0504 77.1 kg (170 lb)   01/06/23 0158 76.8 kg (169 lb 5 oz)   01/05/23 0926 75.3 kg (166 lb)   01/05/23 0519 75.4 kg (166 lb 4.8 oz)   01/04/23 1754 75.8 kg (167 lb)   09/21/22 1202 71.3 kg (157 lb 4 oz)   09/02/22 0500 71.2 kg " (156 lb 15.5 oz)   09/01/22 0500 68.5 kg (151 lb 0.2 oz)   08/31/22 0534 69.2 kg (152 lb 8.9 oz)   08/30/22 2149 69.2 kg (152 lb 8.9 oz)   08/30/22 1431 68 kg (150 lb)   07/30/22 1604 69.4 kg (153 lb)   07/23/22 0646 67.4 kg (148 lb 9.4 oz)   07/22/22 0317 68.7 kg (151 lb 7.3 oz)   07/22/22 0158 68.7 kg (151 lb 7.3 oz)   07/21/22 1845 70.8 kg (156 lb 1.4 oz)   07/19/22 1348 70.8 kg (156 lb)   05/24/22 1333 70.8 kg (156 lb)   03/09/22 0400 71.1 kg (156 lb 12 oz)   03/08/22 0500 70.9 kg (156 lb 4.9 oz)   03/07/22 0616 74.2 kg (163 lb 9.3 oz)   03/07/22 0430 74.2 kg (163 lb 9.3 oz)   03/06/22 0620 74.4 kg (164 lb 1.6 oz)   03/05/22 0756 69.9 kg (154 lb)   03/04/22 1840 70.2 kg (154 lb 11.2 oz)   03/04/22 1217 71.7 kg (158 lb)   02/08/22 1124 71.7 kg (158 lb)   01/26/22 1308 71.7 kg (158 lb)   12/31/21 1255 78.5 kg (173 lb)   12/16/21 1416 71.7 kg (158 lb)   12/02/21 1054 71.7 kg (158 lb)   11/17/21 1247 71.7 kg (158 lb 2 oz)   11/16/21 0625 77.4 kg (170 lb 10.2 oz)   11/15/21 0605 79.4 kg (175 lb 0.7 oz)   11/13/21 1718 79.4 kg (175 lb)   09/02/21 1105 79.8 kg (176 lb)   07/27/21 0532 79.9 kg (176 lb 2.4 oz)   07/26/21 1544 78 kg (172 lb)   07/26/21 0545 78.3 kg (172 lb 9.9 oz)   07/25/21 2145 77 kg (169 lb 12.1 oz)   07/25/21 1245 72.6 kg (160 lb)   06/20/21 1415 81.2 kg (179 lb)   05/18/21 1337 82.6 kg (182 lb)   04/11/21 1901 82.6 kg (182 lb)   04/03/21 0348 81.1 kg (178 lb 12.7 oz)   04/02/21 0346 81.2 kg (179 lb 0.2 oz)   04/01/21 0607 81.3 kg (179 lb 3.7 oz)   03/31/21 2210 81.5 kg (179 lb 9.6 oz)   03/31/21 1909 81.6 kg (180 lb)   03/22/21 1423 81.6 kg (180 lb)   12/01/20 0927 81.6 kg (180 lb)   11/02/20 1847 81.6 kg (180 lb)   10/08/20 1501 84.4 kg (186 lb)   10/01/20 1452 81.6 kg (180 lb)      BMI kg/m2 Body mass index is 30.07 kg/m².       Labs        Pertinent Labs    Results from last 7 days   Lab Units 09/15/23  0349 09/13/23  0255 09/12/23  1436   SODIUM mmol/L 142 142 139   POTASSIUM mmol/L 4.6 4.2 4.2    CHLORIDE mmol/L 106 108* 104   CO2 mmol/L 29.0 28.0 23.0   BUN mg/dL 22 19 15   CREATININE mg/dL 0.96 1.08* 0.72   CALCIUM mg/dL 8.2* 8.3* 9.5   BILIRUBIN mg/dL 0.4  --  1.0   ALK PHOS U/L 104  --  97   ALT (SGPT) U/L 13  --  8   AST (SGOT) U/L 28  --  11   GLUCOSE mg/dL 145* 106* 121*     Results from last 7 days   Lab Units 09/15/23  0349   MAGNESIUM mg/dL 2.1   PHOSPHORUS mg/dL 3.0   HEMOGLOBIN g/dL 11.1*   HEMATOCRIT % 34.9     Lab Results   Component Value Date    HGBA1C 5.9 (H) 01/05/2023        Medications    Scheduled Medications apixaban, 5 mg, Oral, Q12H  atorvastatin, 10 mg, Oral, Nightly  digoxin, 125 mcg, Oral, Daily  docusate sodium, 100 mg, Oral, Daily  gabapentin, 600 mg, Oral, 4x Daily  levothyroxine, 88 mcg, Oral, Q AM  metoclopramide, 5 mg, Oral, 4x Daily AC & at Bedtime  metoprolol succinate XL, 25 mg, Oral, Q24H  [employer]ut[employer] fiber, 1 packet, Oral, Daily  pantoprazole, 40 mg, Oral, BID AC  polyethylene glycol, 17 g, Oral, Daily  rOPINIRole, 0.25 mg, Oral, Nightly  senna-docusate sodium, 2 tablet, Oral, BID  sertraline, 50 mg, Oral, Nightly  sucralfate, 1 g, Oral, TID AC        Infusions      PRN Medications   acetaminophen **OR** acetaminophen **OR** acetaminophen    aluminum-magnesium hydroxide-simethicone    senna-docusate sodium **AND** polyethylene glycol **AND** bisacodyl **AND** bisacodyl    HYDROcodone-acetaminophen    Magnesium Cardiology Dose Replacement - Follow Nurse / BPA Driven Protocol    melatonin    ondansetron **OR** ondansetron    ondansetron    Potassium Replacement - Follow Nurse / BPA Driven Protocol    [COMPLETED] Insert Peripheral IV **AND** sodium chloride    traZODone     Physical Findings        Trending Physical   Appearance, NFPE 9/15: NFPE completed and not consistent with nutrition diagnosis of malnutrition at this time using AND/ASPEN criteria      --  Edema  1+ ankles, feet     Bowel Function 9/12 + BM     Tubes None      Chewing/Swallowing No reported difficulty       Skin Intact      --  Current Nutrition Orders & Evaluation of Intake       Oral Nutrition     Food Allergies NKFA   Current PO Diet Diet: Cardiac Diets; Healthy Heart (2-3 Na+); Texture: Regular Texture (IDDSI 7); Fluid Consistency: Thin (IDDSI 0)   Supplement -   PO Evaluation     Trending % PO Intake 9/15: %   --  Nutritional Risk Screening        NRS-2002 Score          Nutrition Diagnosis         Nutrition Dx Problem 1   No acute nutrition dx at this time; RD to follow up per protocol    Nutrition Dx Problem 2        Intervention Goal         Intervention Goal(s) PO intake > 75%     Nutrition Intervention        RD Action Continue current diet     Nutrition Prescription          Diet Prescription Healthy heart   Supplement Prescription None    --  Monitor/Evaluation        Monitor Per protocol, PO intake, Pertinent labs, Weight       Electronically signed by:  Keri Ruiz RD  09/15/23 10:54 EDT

## 2023-09-15 NOTE — HOME HEALTH
Patient is current with [facility]. Patient was admitted to [facility] on 09/13/2023. We will continue to follow and resume care once discharged home. Thank you.

## 2023-09-15 NOTE — PROGRESS NOTES
Anesthetic History   No history of anesthetic complications            Review of Systems / Medical History  Patient summary reviewed and pertinent labs reviewed    Pulmonary        Sleep apnea        Comments: Pulmonary fibrosis - 2L NC O2 qhs   Neuro/Psych         Psychiatric history     Cardiovascular    Hypertension: well controlled        Dysrhythmias : atrial fibrillation  Hyperlipidemia    Exercise tolerance: <4 METS     GI/Hepatic/Renal         Renal disease: CRI       Endo/Other    Diabetes: well controlled, type 2, using insulin  Hypothyroidism: well controlled  Morbid obesity and arthritis     Other Findings              Physical Exam    Airway  Mallampati: II  TM Distance: 4 - 6 cm  Neck ROM: normal range of motion   Mouth opening: Normal     Cardiovascular    Rhythm: irregular  Rate: normal         Dental    Dentition: Full lower dentures and Full upper dentures     Pulmonary  Breath sounds clear to auscultation               Abdominal         Other Findings            Anesthetic Plan    ASA: 3  Anesthesia type: total IV anesthesia            Anesthetic plan and risks discussed with: Patient CARDIOLOGY PROGRESS NOTE:    Catalina Moreno  83 y.o.  female  1940  [id number]      Referring Provider: Hospitalist    Reason for follow-up: Weakness, shortness of breath, palpitations     Patient Care Team:  Rubio Dover MD as PCP - General (Family Medicine)  Flash Gonzalez MD as Consulting Physician (Cardiology)    Subjective  Patient seen and examined.  Labs and chart reviewed.  Patient is doing well today from cardiology standpoint and currently denies chest pain, shortness of breath, palpitations.      Objective  Patient lying in bed resting comfortably on room air     Review of Systems   Constitutional: Negative for chills, fever and malaise/fatigue.   Cardiovascular:  Negative for chest pain, leg swelling, palpitations and syncope.   Respiratory:  Negative for cough and shortness of breath.    Gastrointestinal:  Negative for abdominal pain, diarrhea, nausea and vomiting.   Neurological:  Negative for dizziness, headaches, light-headedness and weakness.     Allergies: Baclofen, Codeine, Ibuprofen, Methocarbamol, Naproxen, Tizanidine hcl, and Tolmetin    Scheduled Meds:apixaban, 5 mg, Oral, Q12H  atorvastatin, 10 mg, Oral, Nightly  digoxin, 125 mcg, Oral, Daily  docusate sodium, 100 mg, Oral, Daily  gabapentin, 600 mg, Oral, 4x Daily  levothyroxine, 88 mcg, Oral, Q AM  metoclopramide, 5 mg, Oral, 4x Daily AC & at Bedtime  metoprolol succinate XL, 25 mg, Oral, Q24H  Nutrisource fiber, 1 packet, Oral, Daily  pantoprazole, 40 mg, Oral, BID AC  polyethylene glycol, 17 g, Oral, Daily  rOPINIRole, 0.25 mg, Oral, Nightly  senna-docusate sodium, 2 tablet, Oral, BID  sertraline, 50 mg, Oral, Nightly  sucralfate, 1 g, Oral, TID AC      Continuous Infusions:   PRN Meds:.  acetaminophen **OR** acetaminophen **OR** acetaminophen    aluminum-magnesium hydroxide-simethicone    senna-docusate sodium **AND** polyethylene glycol **AND** bisacodyl **AND** bisacodyl    HYDROcodone-acetaminophen    Magnesium  "Cardiology Dose Replacement - Follow Nurse / BPA Driven Protocol    melatonin    ondansetron **OR** ondansetron    ondansetron    Potassium Replacement - Follow Nurse / BPA Driven Protocol    [COMPLETED] Insert Peripheral IV **AND** sodium chloride    traZODone        VITAL SIGNS  Vitals:    09/15/23 0415 09/15/23 0835 09/15/23 1057 09/15/23 1126   BP: 138/60 142/57 142/57 103/55   BP Location: Left arm Left arm  Right arm   Patient Position: Lying Lying  Lying   Pulse: 73 73 84 85   Resp: 12 18 13   Temp: 98.8 °F (37.1 °C) 98 °F (36.7 °C)  98.2 °F (36.8 °C)   TempSrc: Oral Oral  Oral   SpO2: 94%   95%   Weight: 77 kg (169 lb 12.1 oz)      Height:           Flowsheet Rows      Flowsheet Row First Filed Value   Admission Height 160 cm (63\") Documented at 09/12/2023 1413   Admission Weight 69.9 kg (154 lb) Documented at 09/12/2023 1413             TELEMETRY: Atrial fibrillation with controlled ventricular response    Physical Exam:  Vitals reviewed.   Constitutional:       Appearance: Not in distress.   Eyes:      Pupils: Pupils are equal, round, and reactive to light.   HENT:    Mouth/Throat:      Pharynx: Oropharynx is clear.   Pulmonary:      Effort: Pulmonary effort is normal.      Breath sounds: Normal breath sounds.   Cardiovascular:      Normal rate. Irregularly irregular rhythm.   Pulses:     Intact distal pulses.   Edema:     Peripheral edema absent.   Abdominal:      General: Bowel sounds are normal.   Musculoskeletal: Normal range of motion.      Cervical back: Normal range of motion and neck supple. Skin:     General: Skin is warm.   Neurological:      General: No focal deficit present.      Mental Status: Alert.        Results Review:   I reviewed the patient's new clinical results.  Lab Results (last 24 hours)       Procedure Component Value Units Date/Time    Magnesium [[id number]]  (Normal) Collected: 09/15/23 0349    Specimen: Blood Updated: 09/15/23 0451     Magnesium 2.1 mg/dL     Comprehensive " Metabolic Panel [[id number]]  (Abnormal) Collected: 09/15/23 0349    Specimen: Blood Updated: 09/15/23 0451     Glucose 145 mg/dL      BUN 22 mg/dL      Creatinine 0.96 mg/dL      Sodium 142 mmol/L      Potassium 4.6 mmol/L      Chloride 106 mmol/L      CO2 29.0 mmol/L      Calcium 8.2 mg/dL      Total Protein 6.7 g/dL      Albumin 3.9 g/dL      ALT (SGPT) 13 U/L      AST (SGOT) 28 U/L      Alkaline Phosphatase 104 U/L      Total Bilirubin 0.4 mg/dL      Globulin 2.8 gm/dL      A/G Ratio 1.4 g/dL      BUN/Creatinine Ratio 22.9     Anion Gap 7.0 mmol/L      eGFR 59.2 mL/min/1.73     Narrative:      GFR Normal >60  Chronic Kidney Disease <60  Kidney Failure <15    The GFR formula is only valid for adults with stable renal function between ages 18 and 70.    Lipase [[id number]]  (Normal) Collected: 09/15/23 0349    Specimen: Blood Updated: 09/15/23 0451     Lipase 14 U/L     Phosphorus [[id number]]  (Normal) Collected: 09/15/23 0349    Specimen: Blood Updated: 09/15/23 0451     Phosphorus 3.0 mg/dL     CBC & Differential [[id number]]  (Abnormal) Collected: 09/15/23 0349    Specimen: Blood Updated: 09/15/23 0428    Narrative:      The following orders were created for panel order CBC & Differential.  Procedure                               Abnormality         Status                     ---------                               -----------         ------                     CBC Auto Differential[[id number]]        Abnormal            Final result                 Please view results for these tests on the individual orders.    CBC Auto Differential [[id number]]  (Abnormal) Collected: 09/15/23 0349    Specimen: Blood Updated: 09/15/23 0428     WBC 6.20 10*3/mm3      RBC 3.98 10*6/mm3      Hemoglobin 11.1 g/dL      Hematocrit 34.9 %      MCV 87.8 fL      MCH 28.0 pg      MCHC 31.9 g/dL      RDW 17.4 %      RDW-SD 53.8 fl      MPV 8.7 fL      Platelets 236 10*3/mm3      Neutrophil % 48.0 %      Lymphocyte % 38.4 %      Monocyte %  8.9 %      Eosinophil % 3.5 %      Basophil % 1.2 %      Neutrophils, Absolute 3.00 10*3/mm3      Lymphocytes, Absolute 2.40 10*3/mm3      Monocytes, Absolute 0.50 10*3/mm3      Eosinophils, Absolute 0.20 10*3/mm3      Basophils, Absolute 0.10 10*3/mm3      nRBC 0.1 /100 WBC             Imaging Results (Last 24 Hours)       ** No results found for the last 24 hours. **            EKG      I personally viewed and interpreted the patient's EKG/Telemetry data:    ECHOCARDIOGRAM:  Results for orders placed during the hospital encounter of 09/05/23    Adult Transthoracic Echo Complete W/ Cont if Necessary Per Protocol    Interpretation Summary    Left ventricular ejection fraction appears to be 51 - 55%.    The right ventricular cavity is moderately dilated.    The left atrial cavity is moderately dilated.    The right atrial cavity is mildly  dilated.    There is calcification of the aortic valve.    Moderate aortic valve regurgitation is present.    Estimated right ventricular systolic pressure from tricuspid regurgitation is mildly elevated (35-45 mmHg).       STRESS MYOVIEW:  Results for orders placed during the hospital encounter of 03/31/21    Stress Test With Myocardial Perfusion One Day    Interpretation Summary  · Findings consistent with a normal ECG stress test.  · Left ventricular ejection fraction is hyperdynamic (Calculated EF > 70%). .  · Impressions are consistent with a study indicating uncertain risk.  · Large apical defect with some reperfusion during the rest images cannot rule out ischemia EF 76% Clinical correlation is recommended.    Electronically signed by KALYANI Tay, 04/01/21, 11:25 AM EDT.       CARDIAC CATHETERIZATION:  Results for orders placed during the hospital encounter of 03/31/21    Cardiac Catheterization/Vascular Study    Narrative  CARDIAC CATHETERIZATION REPORT    DATE OF PROCEDURE: 4/2/2021      INDICATION FOR PROCEDURE: Abnormal stress test    PROCEDURE PERFORMED:  Left heart catheterization, coronary angiography,    PROCEDURE COMMENTS:    All the risks and benefits explained with the patient informed consent was obtained from the patient.  Patient was brought to the cardiac catheterization laboratory placed on the table draped and prepped in the usual sterile fashion.  2% lidocaine was used to anesthetize the right groin area.  Using the modified Seldinger technique 5 Kyrgyz sheath was inserted into the right femoral artery.    5 K[place]z JL4 catheter was used to perform the selective left coronary angiography    5 [place] JR4 catheter was used to perform the selective right coronary angiography    Angio-Seal was placed after exchanging five Kyrgyz sheath with six Kyrgyz sheath    Patient tolerated procedure well without any immediate complications      FINDINGS:    1. HEMODYNAMICS:  LV end-diastolic pressure 7 mmHg, /80 mmHg.    2. LEFT VENTRICULOGRAPHY: Not done patient had echocardiogram    3. CORONARY ANGIOGRAPHY:  Dominance right  Left Main: Large-caliber vessel bifurcates into LAD circumflex artery 0% stenosis  LAD: Large-caliber vessel gives rise to couple of medium caliber diagonal arteries multiple septal branches reaches the apex mild luminal irregularities noted less than 10% stenosis  CIRC: Large-caliber vessel gives rise to marginal lateral branches less than 10% stenosis  RCA: Large-caliber dominant artery gives rise to PDA and PL branches less than 5 to 10% stenosis    SUMMARY: No obstructive coronary artery disease    RECOMMENDATIONS: Evaluate for noncardiac causes of symptoms aggressive cardiac risk factor modification       OTHER:         Assessment & Plan     Principal Problem:    Atrial flutter with rapid ventricular response  Active Problems:    Atrial fibrillation with RVR    Nausea    Pain of upper abdomen       Atrial fibrillation  Sick sinus syndrome s/p pacemaker implantation  Patient presented with palpitations and atrial fibrillation with rapid  response  Patient is currently on metoprolol tartrate, digoxin, and Eliquis at home   Medications restarted and patient heart rate now well controlled  Patient likely recently noncompliant at home with medications as she states time usually picks up medications and is a  and has been busy  Digoxin level less than 0.30  Patient underwent dual-chamber permanent pacemaker implantation  2023  Pacemaker working well    Coronary disease  Most recent cardiac catheterization from 2021 shows nonobstructive coronary disease  Patient currently denies chest pain  Patient is on medical therapy to include beta-blocker, statin     Congestive heart failure with preserved ejection fraction  Patient has history of HFrEF with EF of 36 to 40% in January but most recent echocardiogram shows improved LVEF of 51 to 55% with moderate aortic valve regurgitation and moderately dilated RV  Patient is on home GDMT to include metoprolol succinate, Entresto  Will change patient current beta-blocker from Lopressor to succinate for heart failure  We will start patient on SGL 2 inhibitor    Hypertension  Blood pressures currently stable  Patient is on metoprolol succinate 25 mg daily      Hyperlipidemia  Patient is on statin therapy  Most recent lipid panel within normal limits    Nausea  GI consulted   EGD completed which showed mild nonerosive gastritis  Patient started on bowel regimen    Patient is okay to discharge from cardiology standpoint.  We will follow as outpatient.  Patient likely to see Dr. Fountain in [place] as Dr. Gonzalez no longer goes there, patient aware as this was discussed during most recent admission.  If patient would like to follow with Dr. Gonzalez in [place] that is fine but given transportation issues thinks it would be best to follow in [place] clinic.   Patient to be seen within 2 to 4 weeks following discharge.    I discussed the patients findings and my recommendations with patient and nurse    Demetrice  KALYANI Gupta  09/15/23  13:13 EDT

## 2023-09-16 ENCOUNTER — HOME CARE VISIT (OUTPATIENT)
Dept: HOME HEALTH SERVICES | Facility: HOME HEALTHCARE | Age: 83
End: 2023-09-16
Payer: MEDICARE

## 2023-09-16 ENCOUNTER — READMISSION MANAGEMENT (OUTPATIENT)
Dept: CALL CENTER | Facility: HOSPITAL | Age: 83
End: 2023-09-16
Payer: MEDICARE

## 2023-09-16 VITALS
OXYGEN SATURATION: 96 % | HEIGHT: 63 IN | RESPIRATION RATE: 18 BRPM | SYSTOLIC BLOOD PRESSURE: 144 MMHG | DIASTOLIC BLOOD PRESSURE: 64 MMHG | TEMPERATURE: 98.7 F | WEIGHT: 150 LBS | HEART RATE: 93 BPM | BODY MASS INDEX: 26.58 KG/M2

## 2023-09-16 PROCEDURE — G0299 HHS/HOSPICE OF RN EA 15 MIN: HCPCS

## 2023-09-16 NOTE — HOME HEALTH
Resumption of Care Note: Pt was admitted back into the hospital for acute gastritis without bleeding. Patient is weak from not eating her normal diet. PT, OT, and MSW will be brought in to assist with weakness and MSW for transportation issues and medication delivery services to her home.     Reason for hospitalization/new problems: pt was readmitted related to profound weakness    [facility] [place] Findings: pt reported that she was unable to eat anything. pt was sitting on the couch with boxes of empty crackers and snack food near her    New/changed medications: patient has four medications not added to POC related to her son had not picked them up from the pharmacy at this time. Medication planner is set up except for those medications     New/changed orders:   new medications not on plan of care at this time. not in the home and unable to verify  Carafate  protonix  colace  lipitor  lanoxin  maalox    Plan/Focus of Care and Skilled need: Paroxysmal atrial fibrillation     Plan for next visit: SN for cardiopulmonary assessment, medication education, pain assessment, fall prevention education, medication planner set up, monitoring for n/v

## 2023-09-16 NOTE — OUTREACH NOTE
Prep Survey      Flowsheet Row Responses   [facility] patient discharged from? [facility]   Is LACE score < 7 ? No   Eligibility Readm Mgmt   Discharge diagnosis A fib with RVR   Does the patient have one of the following disease processes/diagnoses(primary or secondary)? Other   Does the patient have Home health ordered? Yes   What is the Home health agency?  [facility]   Is there a DME ordered? No   Prep survey completed? Yes            Sadia WHITTEN - Registered Nurse

## 2023-09-18 ENCOUNTER — HOSPITAL ENCOUNTER (OUTPATIENT)
Facility: HOSPITAL | Age: 83
Setting detail: OBSERVATION
Discharge: HOME HEALTH | End: 2023-09-20
Attending: EMERGENCY MEDICINE | Admitting: EMERGENCY MEDICINE
Payer: MEDICARE

## 2023-09-18 ENCOUNTER — HOME CARE VISIT (OUTPATIENT)
Dept: HOME HEALTH SERVICES | Facility: HOME HEALTHCARE | Age: 83
End: 2023-09-18
Payer: MEDICARE

## 2023-09-18 ENCOUNTER — APPOINTMENT (OUTPATIENT)
Dept: GENERAL RADIOLOGY | Facility: HOSPITAL | Age: 83
End: 2023-09-18
Payer: MEDICARE

## 2023-09-18 VITALS — RESPIRATION RATE: 16 BRPM | OXYGEN SATURATION: 97 % | TEMPERATURE: 98.3 F | HEART RATE: 96 BPM

## 2023-09-18 DIAGNOSIS — R11.0 NAUSEA: ICD-10-CM

## 2023-09-18 DIAGNOSIS — R00.2 PALPITATIONS: Primary | ICD-10-CM

## 2023-09-18 LAB
ALBUMIN SERPL-MCNC: 4.3 G/DL (ref 3.5–5.2)
ALBUMIN/GLOB SERPL: 1.5 G/DL
ALP SERPL-CCNC: 119 U/L (ref 39–117)
ALT SERPL W P-5'-P-CCNC: 13 U/L (ref 1–33)
ANION GAP SERPL CALCULATED.3IONS-SCNC: 12 MMOL/L (ref 5–15)
APTT PPP: 29.9 SECONDS (ref 24–31)
AST SERPL-CCNC: 19 U/L (ref 1–32)
B PARAPERT DNA SPEC QL NAA+PROBE: NOT DETECTED
B PERT DNA SPEC QL NAA+PROBE: NOT DETECTED
BASOPHILS # BLD AUTO: 0.1 10*3/MM3 (ref 0–0.2)
BASOPHILS NFR BLD AUTO: 0.9 % (ref 0–1.5)
BILIRUB SERPL-MCNC: 1.2 MG/DL (ref 0–1.2)
BILIRUB UR QL STRIP: NEGATIVE
BUN SERPL-MCNC: 13 MG/DL (ref 8–23)
BUN/CREAT SERPL: 16.7 (ref 7–25)
C PNEUM DNA NPH QL NAA+NON-PROBE: NOT DETECTED
CALCIUM SPEC-SCNC: 9.3 MG/DL (ref 8.6–10.5)
CHLORIDE SERPL-SCNC: 104 MMOL/L (ref 98–107)
CHOLEST SERPL-MCNC: 130 MG/DL (ref 0–200)
CLARITY UR: CLEAR
CO2 SERPL-SCNC: 25 MMOL/L (ref 22–29)
COLOR UR: YELLOW
CREAT SERPL-MCNC: 0.78 MG/DL (ref 0.57–1)
DEPRECATED RDW RBC AUTO: 52.5 FL (ref 37–54)
DIGOXIN SERPL-MCNC: 0.6 NG/ML (ref 0.6–1.2)
EGFRCR SERPLBLD CKD-EPI 2021: 75.9 ML/MIN/1.73
EOSINOPHIL # BLD AUTO: 0.1 10*3/MM3 (ref 0–0.4)
EOSINOPHIL NFR BLD AUTO: 1 % (ref 0.3–6.2)
ERYTHROCYTE [DISTWIDTH] IN BLOOD BY AUTOMATED COUNT: 17.5 % (ref 12.3–15.4)
FLUAV SUBTYP SPEC NAA+PROBE: NOT DETECTED
FLUBV RNA ISLT QL NAA+PROBE: NOT DETECTED
GEN 5 2HR TROPONIN T REFLEX: 10 NG/L
GLOBULIN UR ELPH-MCNC: 2.9 GM/DL
GLUCOSE SERPL-MCNC: 101 MG/DL (ref 65–99)
GLUCOSE UR STRIP-MCNC: NEGATIVE MG/DL
HADV DNA SPEC NAA+PROBE: NOT DETECTED
HBA1C MFR BLD: 5.8 % (ref 4.8–5.6)
HCOV 229E RNA SPEC QL NAA+PROBE: NOT DETECTED
HCOV HKU1 RNA SPEC QL NAA+PROBE: NOT DETECTED
HCOV NL63 RNA SPEC QL NAA+PROBE: NOT DETECTED
HCOV OC43 RNA SPEC QL NAA+PROBE: NOT DETECTED
HCT VFR BLD AUTO: 38.5 % (ref 34–46.6)
HDLC SERPL-MCNC: 59 MG/DL (ref 40–60)
HGB BLD-MCNC: 12.4 G/DL (ref 12–15.9)
HGB UR QL STRIP.AUTO: NEGATIVE
HMPV RNA NPH QL NAA+NON-PROBE: NOT DETECTED
HOLD SPECIMEN: NORMAL
HPIV1 RNA ISLT QL NAA+PROBE: NOT DETECTED
HPIV2 RNA SPEC QL NAA+PROBE: NOT DETECTED
HPIV3 RNA NPH QL NAA+PROBE: NOT DETECTED
HPIV4 P GENE NPH QL NAA+PROBE: NOT DETECTED
INR PPP: 1 (ref 0.93–1.1)
KETONES UR QL STRIP: ABNORMAL
LDLC SERPL CALC-MCNC: 57 MG/DL (ref 0–100)
LDLC/HDLC SERPL: 0.96 {RATIO}
LEUKOCYTE ESTERASE UR QL STRIP.AUTO: NEGATIVE
LYMPHOCYTES # BLD AUTO: 1.4 10*3/MM3 (ref 0.7–3.1)
LYMPHOCYTES NFR BLD AUTO: 21.6 % (ref 19.6–45.3)
M PNEUMO IGG SER IA-ACNC: NOT DETECTED
MAGNESIUM SERPL-MCNC: 1.8 MG/DL (ref 1.6–2.4)
MCH RBC QN AUTO: 27.8 PG (ref 26.6–33)
MCHC RBC AUTO-ENTMCNC: 32.1 G/DL (ref 31.5–35.7)
MCV RBC AUTO: 86.5 FL (ref 79–97)
MONOCYTES # BLD AUTO: 0.4 10*3/MM3 (ref 0.1–0.9)
MONOCYTES NFR BLD AUTO: 6.5 % (ref 5–12)
NEUTROPHILS NFR BLD AUTO: 4.5 10*3/MM3 (ref 1.7–7)
NEUTROPHILS NFR BLD AUTO: 70 % (ref 42.7–76)
NITRITE UR QL STRIP: NEGATIVE
NRBC BLD AUTO-RTO: 0.1 /100 WBC (ref 0–0.2)
NT-PROBNP SERPL-MCNC: 1318 PG/ML (ref 0–1800)
PH UR STRIP.AUTO: 7 [PH] (ref 5–8)
PLATELET # BLD AUTO: 254 10*3/MM3 (ref 140–450)
PMV BLD AUTO: 8.8 FL (ref 6–12)
POTASSIUM SERPL-SCNC: 4 MMOL/L (ref 3.5–5.2)
PROT SERPL-MCNC: 7.2 G/DL (ref 6–8.5)
PROT UR QL STRIP: NEGATIVE
PROTHROMBIN TIME: 10.7 SECONDS (ref 9.6–11.7)
RBC # BLD AUTO: 4.45 10*6/MM3 (ref 3.77–5.28)
RHINOVIRUS RNA SPEC NAA+PROBE: NOT DETECTED
RSV RNA NPH QL NAA+NON-PROBE: NOT DETECTED
SARS-COV-2 RNA NPH QL NAA+NON-PROBE: NOT DETECTED
SODIUM SERPL-SCNC: 141 MMOL/L (ref 136–145)
SP GR UR STRIP: 1.01 (ref 1–1.03)
T4 FREE SERPL-MCNC: 1.05 NG/DL (ref 0.93–1.7)
TRIGL SERPL-MCNC: 71 MG/DL (ref 0–150)
TROPONIN T DELTA: 1 NG/L
TROPONIN T SERPL HS-MCNC: 9 NG/L
TSH SERPL DL<=0.05 MIU/L-ACNC: 2.17 UIU/ML (ref 0.27–4.2)
UROBILINOGEN UR QL STRIP: ABNORMAL
VLDLC SERPL-MCNC: 14 MG/DL (ref 5–40)
WBC NRBC COR # BLD: 6.4 10*3/MM3 (ref 3.4–10.8)
WHOLE BLOOD HOLD COAG: NORMAL
WHOLE BLOOD HOLD SPECIMEN: NORMAL

## 2023-09-18 PROCEDURE — 25010000002 ONDANSETRON PER 1 MG: Performed by: PHYSICIAN ASSISTANT

## 2023-09-18 PROCEDURE — 83036 HEMOGLOBIN GLYCOSYLATED A1C: CPT | Performed by: NURSE PRACTITIONER

## 2023-09-18 PROCEDURE — 80061 LIPID PANEL: CPT | Performed by: NURSE PRACTITIONER

## 2023-09-18 PROCEDURE — 85730 THROMBOPLASTIN TIME PARTIAL: CPT | Performed by: PHYSICIAN ASSISTANT

## 2023-09-18 PROCEDURE — 83880 ASSAY OF NATRIURETIC PEPTIDE: CPT | Performed by: NURSE PRACTITIONER

## 2023-09-18 PROCEDURE — 81003 URINALYSIS AUTO W/O SCOPE: CPT | Performed by: PHYSICIAN ASSISTANT

## 2023-09-18 PROCEDURE — 84443 ASSAY THYROID STIM HORMONE: CPT | Performed by: NURSE PRACTITIONER

## 2023-09-18 PROCEDURE — 99284 EMERGENCY DEPT VISIT MOD MDM: CPT

## 2023-09-18 PROCEDURE — 83735 ASSAY OF MAGNESIUM: CPT | Performed by: NURSE PRACTITIONER

## 2023-09-18 PROCEDURE — 85025 COMPLETE CBC W/AUTO DIFF WBC: CPT | Performed by: PHYSICIAN ASSISTANT

## 2023-09-18 PROCEDURE — 25010000002 ENOXAPARIN PER 10 MG: Performed by: NURSE PRACTITIONER

## 2023-09-18 PROCEDURE — G0378 HOSPITAL OBSERVATION PER HR: HCPCS

## 2023-09-18 PROCEDURE — 85610 PROTHROMBIN TIME: CPT | Performed by: PHYSICIAN ASSISTANT

## 2023-09-18 PROCEDURE — 84484 ASSAY OF TROPONIN QUANT: CPT | Performed by: PHYSICIAN ASSISTANT

## 2023-09-18 PROCEDURE — 96374 THER/PROPH/DIAG INJ IV PUSH: CPT

## 2023-09-18 PROCEDURE — 96372 THER/PROPH/DIAG INJ SC/IM: CPT

## 2023-09-18 PROCEDURE — G0151 HHCP-SERV OF PT,EA 15 MIN: HCPCS

## 2023-09-18 PROCEDURE — 80162 ASSAY OF DIGOXIN TOTAL: CPT

## 2023-09-18 PROCEDURE — G0299 HHS/HOSPICE OF RN EA 15 MIN: HCPCS

## 2023-09-18 PROCEDURE — 0202U NFCT DS 22 TRGT SARS-COV-2: CPT | Performed by: PHYSICIAN ASSISTANT

## 2023-09-18 PROCEDURE — 80053 COMPREHEN METABOLIC PANEL: CPT | Performed by: PHYSICIAN ASSISTANT

## 2023-09-18 PROCEDURE — 71045 X-RAY EXAM CHEST 1 VIEW: CPT

## 2023-09-18 PROCEDURE — 36415 COLL VENOUS BLD VENIPUNCTURE: CPT

## 2023-09-18 PROCEDURE — 93005 ELECTROCARDIOGRAM TRACING: CPT

## 2023-09-18 PROCEDURE — 84439 ASSAY OF FREE THYROXINE: CPT | Performed by: NURSE PRACTITIONER

## 2023-09-18 RX ORDER — POLYETHYLENE GLYCOL 3350 17 G/17G
17 POWDER, FOR SOLUTION ORAL DAILY PRN
Status: DISCONTINUED | OUTPATIENT
Start: 2023-09-18 | End: 2023-09-20 | Stop reason: HOSPADM

## 2023-09-18 RX ORDER — BISACODYL 5 MG/1
5 TABLET, DELAYED RELEASE ORAL DAILY PRN
Status: DISCONTINUED | OUTPATIENT
Start: 2023-09-18 | End: 2023-09-20 | Stop reason: HOSPADM

## 2023-09-18 RX ORDER — ONDANSETRON 2 MG/ML
4 INJECTION INTRAMUSCULAR; INTRAVENOUS EVERY 6 HOURS PRN
Status: DISCONTINUED | OUTPATIENT
Start: 2023-09-18 | End: 2023-09-20 | Stop reason: HOSPADM

## 2023-09-18 RX ORDER — GABAPENTIN 600 MG/1
600 TABLET ORAL 2 TIMES DAILY
Status: DISCONTINUED | OUTPATIENT
Start: 2023-09-18 | End: 2023-09-20 | Stop reason: HOSPADM

## 2023-09-18 RX ORDER — BISACODYL 10 MG
10 SUPPOSITORY, RECTAL RECTAL DAILY PRN
Status: DISCONTINUED | OUTPATIENT
Start: 2023-09-18 | End: 2023-09-20 | Stop reason: HOSPADM

## 2023-09-18 RX ORDER — ONDANSETRON 2 MG/ML
4 INJECTION INTRAMUSCULAR; INTRAVENOUS ONCE
Status: COMPLETED | OUTPATIENT
Start: 2023-09-18 | End: 2023-09-18

## 2023-09-18 RX ORDER — ATORVASTATIN CALCIUM 10 MG/1
10 TABLET, FILM COATED ORAL DAILY
Status: DISCONTINUED | OUTPATIENT
Start: 2023-09-19 | End: 2023-09-20 | Stop reason: HOSPADM

## 2023-09-18 RX ORDER — SODIUM CHLORIDE 0.9 % (FLUSH) 0.9 %
10 SYRINGE (ML) INJECTION EVERY 12 HOURS SCHEDULED
Status: DISCONTINUED | OUTPATIENT
Start: 2023-09-18 | End: 2023-09-20 | Stop reason: HOSPADM

## 2023-09-18 RX ORDER — METOPROLOL SUCCINATE 25 MG/1
25 TABLET, EXTENDED RELEASE ORAL EVERY 24 HOURS
Status: DISCONTINUED | OUTPATIENT
Start: 2023-09-18 | End: 2023-09-19

## 2023-09-18 RX ORDER — SODIUM CHLORIDE 9 MG/ML
40 INJECTION, SOLUTION INTRAVENOUS AS NEEDED
Status: DISCONTINUED | OUTPATIENT
Start: 2023-09-18 | End: 2023-09-20 | Stop reason: HOSPADM

## 2023-09-18 RX ORDER — SODIUM CHLORIDE 0.9 % (FLUSH) 0.9 %
10 SYRINGE (ML) INJECTION AS NEEDED
Status: DISCONTINUED | OUTPATIENT
Start: 2023-09-18 | End: 2023-09-20 | Stop reason: HOSPADM

## 2023-09-18 RX ORDER — ALUMINA, MAGNESIA, AND SIMETHICONE 2400; 2400; 240 MG/30ML; MG/30ML; MG/30ML
5 SUSPENSION ORAL EVERY 6 HOURS PRN
Status: ON HOLD | COMMUNITY
End: 2023-09-18

## 2023-09-18 RX ORDER — ENOXAPARIN SODIUM 100 MG/ML
1 INJECTION SUBCUTANEOUS EVERY 12 HOURS
Status: DISCONTINUED | OUTPATIENT
Start: 2023-09-18 | End: 2023-09-20 | Stop reason: HOSPADM

## 2023-09-18 RX ORDER — ONDANSETRON 4 MG/1
4 TABLET, FILM COATED ORAL EVERY 8 HOURS PRN
COMMUNITY

## 2023-09-18 RX ORDER — HYDROCODONE BITARTRATE AND ACETAMINOPHEN 5; 325 MG/1; MG/1
1 TABLET ORAL ONCE AS NEEDED
Status: COMPLETED | OUTPATIENT
Start: 2023-09-18 | End: 2023-09-18

## 2023-09-18 RX ORDER — TRAZODONE HYDROCHLORIDE 50 MG/1
25 TABLET ORAL NIGHTLY PRN
Status: DISCONTINUED | OUTPATIENT
Start: 2023-09-18 | End: 2023-09-20 | Stop reason: HOSPADM

## 2023-09-18 RX ORDER — ROPINIROLE 0.25 MG/1
0.25 TABLET, FILM COATED ORAL NIGHTLY
Status: DISCONTINUED | OUTPATIENT
Start: 2023-09-18 | End: 2023-09-20 | Stop reason: HOSPADM

## 2023-09-18 RX ORDER — ONDANSETRON 4 MG/1
4 TABLET, FILM COATED ORAL EVERY 6 HOURS PRN
Status: DISCONTINUED | OUTPATIENT
Start: 2023-09-18 | End: 2023-09-20 | Stop reason: HOSPADM

## 2023-09-18 RX ORDER — AMOXICILLIN 250 MG
2 CAPSULE ORAL 2 TIMES DAILY
Status: DISCONTINUED | OUTPATIENT
Start: 2023-09-18 | End: 2023-09-20 | Stop reason: HOSPADM

## 2023-09-18 RX ORDER — ACETAMINOPHEN 325 MG/1
650 TABLET ORAL EVERY 4 HOURS PRN
Status: DISCONTINUED | OUTPATIENT
Start: 2023-09-18 | End: 2023-09-20 | Stop reason: HOSPADM

## 2023-09-18 RX ADMIN — ENOXAPARIN SODIUM 70 MG: 100 INJECTION SUBCUTANEOUS at 19:48

## 2023-09-18 RX ADMIN — SACUBITRIL AND VALSARTAN 1 TABLET: 24; 26 TABLET, FILM COATED ORAL at 20:01

## 2023-09-18 RX ADMIN — HYDROCODONE BITARTRATE AND ACETAMINOPHEN 1 TABLET: 5; 325 TABLET ORAL at 17:19

## 2023-09-18 RX ADMIN — METOPROLOL SUCCINATE 25 MG: 25 TABLET, EXTENDED RELEASE ORAL at 17:19

## 2023-09-18 RX ADMIN — Medication 10 ML: at 20:01

## 2023-09-18 RX ADMIN — ROPINIROLE HYDROCHLORIDE 0.25 MG: 0.25 TABLET, FILM COATED ORAL at 20:01

## 2023-09-18 RX ADMIN — GABAPENTIN 600 MG: 600 TABLET, FILM COATED ORAL at 20:01

## 2023-09-18 RX ADMIN — ONDANSETRON 4 MG: 2 INJECTION INTRAMUSCULAR; INTRAVENOUS at 14:14

## 2023-09-18 NOTE — HOME HEALTH
Routine Visit Note: Upon arrival, patient laying on couch. States that she is feeling miserable. States that she can barely get up off the couch because she is so weak. States that she got home from the hospital on Sat. States that she has been very nauseated and has had abdominal pain. Patient states that she wasn't eating in the hospital and continues not eating at home. States that she has taken immodium and zofran for diarrhea and nausea. Patient's HR is elevated anywhere from . Patient states that when she does try to get up off the couch that she gets really SOB. Michael does not have all her meds in the home. States that her son did not know meds were ready at the pharmacy on Sun and did not pick them up. States that son will be out of state until this weekend and she has noone to get her meds for her. Patient requests that SN call EMS because she does not have the energy to. EMS called at 12:18. Patient denies recent falls. EMS arrived at 12:30 PM and found patient to be in A-fib. Patient being transferred by EMS to [facility] ER.     Skill/education provided:     Patient/caregiver response:     Plan for next visit:     Other pertinent info:

## 2023-09-18 NOTE — CASE MANAGEMENT/SOCIAL WORK
Case Management Discharge Note      Final Note: [facility]       Selected Continued Care - Prior Encounters Includes continued care and service providers with selected services from prior encounters from 6/14/2023 to 9/15/2023      D      Home Medical Care       Service Provider Selected Services Address Phone Fax Patient Preferred    [facility] [address] [facility] [place] [address] [phone] --                          Transportation Services  Private: Car    Final Discharge Disposition Code: 06 - home with home health care

## 2023-09-18 NOTE — ED PROVIDER NOTES
Subjective   History of Present Illness  Patient is an 83-year-old female presenting from home with reports of elevated heart rate and palpitations over the past 3 to 4 days.  She reports associated nausea as well.  Patient reports increased frequency with urination but denies any dysuria.  She also reports she had a few episodes of diarrhea that has improved over the last day.  Denies any significant abdominal pain.  No chest pain recent cough or congestion.  She reports chronic shortness of breath only with exertion and denies significant change.  States she normally is on digoxin and Eliquis.  She has not taken her morning medications.    Cardiologist Dr. Gonzalez      Review of Systems   Constitutional: Negative.    HENT: Negative.     Respiratory: Negative.     Cardiovascular:  Positive for palpitations. Negative for chest pain and leg swelling.   Gastrointestinal:  Positive for diarrhea and nausea. Negative for abdominal distention, abdominal pain and vomiting.   Musculoskeletal:  Negative for back pain, neck pain and neck stiffness.   Skin: Negative.    Neurological: Negative.      Past Medical History:   Diagnosis Date    Acute kidney injury 07/22/2022    Anxiety     Arthritis     Atrial fibrillation     paroxysmal    Broken shoulder     left-- due to fall 11-7-19 was at [facility]    Chronic systolic CHF (congestive heart failure) 01/05/2023    EF 36-40%    Coronary artery disease involving native coronary artery of native heart without angina pectoris 04/02/2021    nonobstructive    DDD (degenerative disc disease), lumbar     Depression     Edema     9/2020 foot    GERD (gastroesophageal reflux disease)     H/O fall     Heart failure with mid-range ejection fraction 03/05/2022    EF 45% per 2D echo    Hypertension     Hypothyroidism     Insomnia     Low back pain     Moderate mitral regurgitation 1/5/2023    Neuropathy     Pain in both feet     PONV (postoperative nausea and vomiting)     Pulmonary hypertension      Radiculopathy     Restless legs     Spondylolisthesis     Stage 3a chronic kidney disease     Urinary incontinence        Allergies   Allergen Reactions    Baclofen Rash    Codeine Nausea Only    Ibuprofen Unknown (See Comments)     Patient doesn't know----Motin    Methocarbamol Unknown (See Comments)     Patient doesn't know    Naproxen Unknown (See Comments)     Pt. Doesn't know     Tizanidine Hcl Other (See Comments)     Syncope     Tolmetin Dizziness     Pt. Doesn't know-- same as Tolectin       Past Surgical History:   Procedure Laterality Date    BACK SURGERY  07/19/2018    PDC &  PSF L3-L5 insitu    CARDIAC CATHETERIZATION N/A 4/2/2021    Procedure: Cardiac Catheterization/Vascular Study;  Surgeon: Barrett Evans MD;  Location: [facility] CATH INVASIVE LOCATION;  Service: Cardiovascular;  Laterality: N/A;    CARDIAC ELECTROPHYSIOLOGY PROCEDURE N/A 1/17/2023    Procedure: Pacemaker DC new;  Surgeon: Baljeet Valero MD;  Location: [facility] CATH INVASIVE LOCATION;  Service: Cardiovascular;  Laterality: N/A;    CHOLECYSTECTOMY      COLONOSCOPY      ENDOSCOPY N/A 9/14/2023    Procedure: ESOPHAGOGASTRODUODENOSCOPY;  Surgeon: Ulysses Lamas MD;  Location: [facility] ENDOSCOPY;  Service: Gastroenterology;  Laterality: N/A;  gastritis, inflammatory gastric polyp    HYSTERECTOMY      ROTATOR CUFF REPAIR Left        Family History   Problem Relation Age of Onset    Cancer Father     Diabetes Son     Cancer Paternal Aunt     Heart disease Paternal Aunt     Cancer Paternal Uncle        Social History     Socioeconomic History    Marital status:    Tobacco Use    Smoking status: Never    Smokeless tobacco: Never   Vaping Use    Vaping Use: Never used   Substance and Sexual Activity    Alcohol use: No    Drug use: Never    Sexual activity: Defer           Objective   Physical Exam  Vitals and nursing note reviewed.   Constitutional:       General: She is not in acute distress.     Appearance: She  "is well-developed. She is obese. She is not ill-appearing, toxic-appearing or diaphoretic.   HENT:      Head: Normocephalic and atraumatic.      Mouth/Throat:      Mouth: Mucous membranes are moist.      Pharynx: Oropharynx is clear.   Eyes:      General: No scleral icterus.     Extraocular Movements: Extraocular movements intact.      Pupils: Pupils are equal, round, and reactive to light.   Cardiovascular:      Rate and Rhythm: Tachycardia present. Rhythm irregularly irregular.      Pulses: Normal pulses.      Heart sounds: No murmur heard.    No friction rub. No gallop.   Pulmonary:      Effort: Pulmonary effort is normal. No respiratory distress.      Breath sounds: Normal breath sounds. No stridor. No wheezing, rhonchi or rales.   Chest:      Chest wall: No tenderness.   Abdominal:      General: Bowel sounds are normal. There is no distension. There are no signs of injury.      Palpations: Abdomen is soft.      Tenderness: There is no abdominal tenderness. There is no guarding or rebound.      Hernia: No hernia is present.   Skin:     General: Skin is warm.      Capillary Refill: Capillary refill takes less than 2 seconds.      Coloration: Skin is not cyanotic, jaundiced or pale.      Findings: No rash.   Neurological:      General: No focal deficit present.      Mental Status: She is alert and oriented to person, place, and time.      GCS: GCS eye subscore is 4. GCS verbal subscore is 5. GCS motor subscore is 6.   Psychiatric:         Mood and Affect: Mood normal.         Behavior: Behavior normal.       Procedures           ED Course    BP (!) 181/101   Pulse 101   Temp 98.7 °F (37.1 °C) (Oral)   Resp 16   Ht 160 cm (63\")   Wt 68 kg (150 lb)   SpO2 94%   BMI 26.57 kg/m²   Medications   sodium chloride 0.9 % flush 10 mL (has no administration in time range)   HYDROcodone-acetaminophen (NORCO) 5-325 MG per tablet 1 tablet (has no administration in time range)   ondansetron (ZOFRAN) injection 4 mg (4 mg " Intravenous Given 9/18/23 1414)     Labs Reviewed   COMPREHENSIVE METABOLIC PANEL - Abnormal; Notable for the following components:       Result Value    Glucose 101 (*)     Alkaline Phosphatase 119 (*)     All other components within normal limits    Narrative:     GFR Normal >60  Chronic Kidney Disease <60  Kidney Failure <15    The GFR formula is only valid for adults with stable renal function between ages 18 and 70.   URINALYSIS W/ CULTURE IF INDICATED - Abnormal; Notable for the following components:    Ketones, UA 15 mg/dL (1+) (*)     All other components within normal limits    Narrative:     In absence of clinical symptoms, the presence of pyuria, bacteria, and/or nitrites on the urinalysis result does not correlate with infection.  Urine microscopic not indicated.   CBC WITH AUTO DIFFERENTIAL - Abnormal; Notable for the following components:    RDW 17.5 (*)     All other components within normal limits   HIGH SENSITIVITIY TROPONIN T 2HR - Abnormal; Notable for the following components:    HS Troponin T 10 (*)     All other components within normal limits    Narrative:     High Sensitive Troponin T Reference Range:  <10.0 ng/L- Negative Female for AMI  <15.0 ng/L- Negative Male for AMI  >=10 - Abnormal Female indicating possible myocardial injury.  >=15 - Abnormal Male indicating possible myocardial injury.   Clinicians would have to utilize clinical acumen, EKG, Troponin, and serial changes to determine if it is an Acute Myocardial Infarction or myocardial injury due to an underlying chronic condition.        RESPIRATORY PANEL PCR W/ COVID-19 (SARS-COV-2) WENDI/MIKAYLA/KEVEN/PAD/COR/MAD/DWIGHT IN-HOUSE, NP SWAB IN [facility], 3-4 HR TAT - Normal    Narrative:     In the setting of a positive respiratory panel with a viral infection PLUS a negative procalcitonin without other underlying concern for bacterial infection, consider observing off antibiotics or discontinuation of antibiotics and continue supportive care. If  the respiratory panel is positive for atypical bacterial infection (Bordetella pertussis, Chlamydophila pneumoniae, or Mycoplasma pneumoniae), consider antibiotic de-escalation to target atypical bacterial infection.   DIGOXIN LEVEL - Normal   TROPONIN - Normal    Narrative:     High Sensitive Troponin T Reference Range:  <10.0 ng/L- Negative Female for AMI  <15.0 ng/L- Negative Male for AMI  >=10 - Abnormal Female indicating possible myocardial injury.  >=15 - Abnormal Male indicating possible myocardial injury.   Clinicians would have to utilize clinical acumen, EKG, Troponin, and serial changes to determine if it is an Acute Myocardial Infarction or myocardial injury due to an underlying chronic condition.        PROTIME-INR - Normal   APTT - Normal   EXTRA TUBES    Narrative:     The following orders were created for panel order Extra Tubes.  Procedure                               Abnormality         Status                     ---------                               -----------         ------                     Lavender Top[[id number]]                                     Final result               Gold Top - SST[[id number]]                                   Final result               Light Blue Top[[id number]]                                   Final result                 Please view results for these tests on the individual orders.   LAVENDER TOP   GOLD TOP - SST   LIGHT BLUE TOP   CBC AND DIFFERENTIAL    Narrative:     The following orders were created for panel order CBC & Differential.  Procedure                               Abnormality         Status                     ---------                               -----------         ------                     CBC Auto Differential[[id number]]        Abnormal            Final result                 Please view results for these tests on the individual orders.     XR Chest 1 View   Final Result   Impression:   No acute process identified         Electronically  Signed: Kirill Catalan MD     9/18/2023 1:16 PM CDT     Workstation ID: [id number]                                               Medical Decision Making  Chart Review: Discharge summary reviewed from 9/15/2023 admitted for A-fib with RVR and digoxin below therapeutic range she presented with generalized weakness was started on Cardizem.  Patient also had EGD for chronic nausea on 9/14/2023 which showed nonerosive gastritis and inflammatory polyp.  She is on PPI and Carafate heart was controlled and she was discharged    Comorbidity: As per past medical history  Differentials: A-fib with RVR, electrolyte abnormality, arrhythmia, ACS, pneumonia, viral illness     ;this list is not all inclusive and does not constitute the entirety of considered causes  EKG: Interpreted by myself and Dr. Stewart shows atrial fibrillation rate 115 previous EKG reviewed from 9/12/2023 showed A-fib  Labs: as above   Radiology: My interpretation chest x-ray shows no acute infiltrate or pneumothorax correlated with radiologist interpretation as below  XR Chest 1 View   Final Result    Impression:    No acute process identified            Electronically Signed: Kirill Catalan MD      9/18/2023 1:16 PM CDT      Workstation ID: [id number]     Disposition/Treatment:  Appropriate PPE was worn during exam and throughout all encounters with the patient.  When the ED IV was placed and labs were obtained patient placed on proper monitors she was afebrile and appeared nontoxic presents to the ED with complaints of palpitation and elevated heart rate at home.  Patient was recently admitted for A-fib with RVR.  EKG showed A-fib rate 115 initially.  Rate controlled while in the ED below 120.  Abs and imaging were obtained.    Labs today show therapeutic digoxin level of 0.6.  Coags unremarkable.  CBC showed no leukocytosis or anemia.  Metabolic panel showed glucose 101 alk phos 119 otherwise normal.  Urinalysis unremarkable for UTI.  Respiratory panel  negative.  Serial troponins 9, and 10 he continues to deny any chest pain.  EKG showed no acute STEMI.  Patient's initial tachycardia improved throughout her ED stay on my reassessment heart rate was in the 80s.  Was given Zofran for nausea and Norco for her chronic restless leg pain which she states she is on narcotic pain medicine for.  Patient is having continued palpitations therefore will be placed in observation for cardiac work-up.  Spoke to NORA Narayanan who agreed for admission.     Amount and/or Complexity of Data Reviewed  External Data Reviewed: labs, ECG and notes.  Labs: ordered. Decision-making details documented in ED Course.  Radiology: ordered. Decision-making details documented in ED Course.  ECG/medicine tests: ordered.  Discussion of management or test interpretation with external provider(s): As above    Risk  Prescription drug management.        Final diagnoses:   Palpitations   Nausea       ED Disposition  ED Disposition       ED Disposition   Decision to Admit    Condition   --    Comment   --               No follow-up provider specified.       Medication List      No changes were made to your prescriptions during this visit.            Dara Hurtado PA  09/18/23 [id number]

## 2023-09-18 NOTE — HOME HEALTH
"Eval Note    Patient's goal(s): \"To get stronger\"    Services required to achieve goals: PT    Potential Issues for goal attainment: Caregiver is out of town     Problems identified: Generalized weakness with complaints of loss of appetite, nausea and dizziness. Telephone conference with RN and she is scheduled to see patient today and on the way.    Describe the Functional status and safety: Patient demonstrates 3/5 gross strength in ble and has no formal home exercise program in place. Functionally, she needs min/cga for coming to stand and only take few steps needing min assist and rollator walker.    Describe any environmental issues: Patient lives alone in one-story home    Any equipment needs: None    POC confirmed with patient on 9/18/23"

## 2023-09-19 ENCOUNTER — HOME CARE VISIT (OUTPATIENT)
Dept: HOME HEALTH SERVICES | Facility: HOME HEALTHCARE | Age: 83
End: 2023-09-19

## 2023-09-19 ENCOUNTER — READMISSION MANAGEMENT (OUTPATIENT)
Dept: CALL CENTER | Facility: HOSPITAL | Age: 83
End: 2023-09-19
Payer: MEDICARE

## 2023-09-19 VITALS
OXYGEN SATURATION: 97 % | TEMPERATURE: 98.1 F | DIASTOLIC BLOOD PRESSURE: 88 MMHG | SYSTOLIC BLOOD PRESSURE: 144 MMHG | RESPIRATION RATE: 18 BRPM | HEART RATE: 115 BPM

## 2023-09-19 LAB
ANION GAP SERPL CALCULATED.3IONS-SCNC: 9 MMOL/L (ref 5–15)
BASOPHILS # BLD AUTO: 0.1 10*3/MM3 (ref 0–0.2)
BASOPHILS NFR BLD AUTO: 1.3 % (ref 0–1.5)
BUN SERPL-MCNC: 13 MG/DL (ref 8–23)
BUN/CREAT SERPL: 14.4 (ref 7–25)
CALCIUM SPEC-SCNC: 8.9 MG/DL (ref 8.6–10.5)
CHLORIDE SERPL-SCNC: 106 MMOL/L (ref 98–107)
CO2 SERPL-SCNC: 25 MMOL/L (ref 22–29)
CREAT SERPL-MCNC: 0.9 MG/DL (ref 0.57–1)
DEPRECATED RDW RBC AUTO: 50.3 FL (ref 37–54)
EGFRCR SERPLBLD CKD-EPI 2021: 64 ML/MIN/1.73
EOSINOPHIL # BLD AUTO: 0.1 10*3/MM3 (ref 0–0.4)
EOSINOPHIL NFR BLD AUTO: 2.5 % (ref 0.3–6.2)
ERYTHROCYTE [DISTWIDTH] IN BLOOD BY AUTOMATED COUNT: 16.8 % (ref 12.3–15.4)
GLUCOSE SERPL-MCNC: 95 MG/DL (ref 65–99)
HCT VFR BLD AUTO: 35.1 % (ref 34–46.6)
HGB BLD-MCNC: 11.3 G/DL (ref 12–15.9)
LYMPHOCYTES # BLD AUTO: 2.3 10*3/MM3 (ref 0.7–3.1)
LYMPHOCYTES NFR BLD AUTO: 43 % (ref 19.6–45.3)
MCH RBC QN AUTO: 27.7 PG (ref 26.6–33)
MCHC RBC AUTO-ENTMCNC: 32.3 G/DL (ref 31.5–35.7)
MCV RBC AUTO: 85.8 FL (ref 79–97)
MONOCYTES # BLD AUTO: 0.5 10*3/MM3 (ref 0.1–0.9)
MONOCYTES NFR BLD AUTO: 8.9 % (ref 5–12)
NEUTROPHILS NFR BLD AUTO: 2.4 10*3/MM3 (ref 1.7–7)
NEUTROPHILS NFR BLD AUTO: 44.3 % (ref 42.7–76)
NRBC BLD AUTO-RTO: 0.1 /100 WBC (ref 0–0.2)
PLATELET # BLD AUTO: 241 10*3/MM3 (ref 140–450)
PMV BLD AUTO: 8.5 FL (ref 6–12)
POTASSIUM SERPL-SCNC: 3.7 MMOL/L (ref 3.5–5.2)
QT INTERVAL: 341 MS
QTC INTERVAL: 473 MS
RBC # BLD AUTO: 4.09 10*6/MM3 (ref 3.77–5.28)
SODIUM SERPL-SCNC: 140 MMOL/L (ref 136–145)
WBC NRBC COR # BLD: 5.4 10*3/MM3 (ref 3.4–10.8)

## 2023-09-19 PROCEDURE — 25010000002 ENOXAPARIN PER 10 MG: Performed by: NURSE PRACTITIONER

## 2023-09-19 PROCEDURE — 80048 BASIC METABOLIC PNL TOTAL CA: CPT | Performed by: NURSE PRACTITIONER

## 2023-09-19 PROCEDURE — G0378 HOSPITAL OBSERVATION PER HR: HCPCS

## 2023-09-19 PROCEDURE — 85025 COMPLETE CBC W/AUTO DIFF WBC: CPT | Performed by: NURSE PRACTITIONER

## 2023-09-19 PROCEDURE — 97162 PT EVAL MOD COMPLEX 30 MIN: CPT

## 2023-09-19 PROCEDURE — 96375 TX/PRO/DX INJ NEW DRUG ADDON: CPT

## 2023-09-19 PROCEDURE — 96361 HYDRATE IV INFUSION ADD-ON: CPT

## 2023-09-19 PROCEDURE — 96372 THER/PROPH/DIAG INJ SC/IM: CPT

## 2023-09-19 RX ORDER — HYDROCODONE BITARTRATE AND ACETAMINOPHEN 5; 325 MG/1; MG/1
1 TABLET ORAL ONCE
Status: COMPLETED | OUTPATIENT
Start: 2023-09-19 | End: 2023-09-19

## 2023-09-19 RX ORDER — METOPROLOL SUCCINATE 25 MG/1
12.5 TABLET, EXTENDED RELEASE ORAL EVERY 24 HOURS
Status: DISCONTINUED | OUTPATIENT
Start: 2023-09-19 | End: 2023-09-20 | Stop reason: HOSPADM

## 2023-09-19 RX ORDER — PANTOPRAZOLE SODIUM 40 MG/10ML
40 INJECTION, POWDER, LYOPHILIZED, FOR SOLUTION INTRAVENOUS
Status: DISCONTINUED | OUTPATIENT
Start: 2023-09-19 | End: 2023-09-20 | Stop reason: HOSPADM

## 2023-09-19 RX ORDER — SUCRALFATE 1 G/1
1 TABLET ORAL
Status: DISCONTINUED | OUTPATIENT
Start: 2023-09-19 | End: 2023-09-20 | Stop reason: HOSPADM

## 2023-09-19 RX ORDER — SODIUM CHLORIDE 9 MG/ML
75 INJECTION, SOLUTION INTRAVENOUS CONTINUOUS
Status: DISCONTINUED | OUTPATIENT
Start: 2023-09-19 | End: 2023-09-20 | Stop reason: HOSPADM

## 2023-09-19 RX ORDER — HYDROCODONE BITARTRATE AND ACETAMINOPHEN 5; 325 MG/1; MG/1
1 TABLET ORAL EVERY 6 HOURS PRN
Status: DISPENSED | OUTPATIENT
Start: 2023-09-19 | End: 2023-09-20

## 2023-09-19 RX ORDER — DIGOXIN 125 MCG
125 TABLET ORAL
Status: DISCONTINUED | OUTPATIENT
Start: 2023-09-19 | End: 2023-09-20 | Stop reason: HOSPADM

## 2023-09-19 RX ORDER — METOCLOPRAMIDE 5 MG/1
5 TABLET ORAL
Status: DISCONTINUED | OUTPATIENT
Start: 2023-09-19 | End: 2023-09-20 | Stop reason: HOSPADM

## 2023-09-19 RX ADMIN — ENOXAPARIN SODIUM 70 MG: 100 INJECTION SUBCUTANEOUS at 18:13

## 2023-09-19 RX ADMIN — ROPINIROLE HYDROCHLORIDE 0.25 MG: 0.25 TABLET, FILM COATED ORAL at 20:28

## 2023-09-19 RX ADMIN — ATORVASTATIN CALCIUM 10 MG: 10 TABLET, FILM COATED ORAL at 09:12

## 2023-09-19 RX ADMIN — Medication 10 ML: at 20:28

## 2023-09-19 RX ADMIN — SUCRALFATE 1 G: 1 TABLET ORAL at 09:12

## 2023-09-19 RX ADMIN — METOCLOPRAMIDE 5 MG: 5 TABLET ORAL at 09:12

## 2023-09-19 RX ADMIN — SACUBITRIL AND VALSARTAN 1 TABLET: 24; 26 TABLET, FILM COATED ORAL at 09:12

## 2023-09-19 RX ADMIN — SUCRALFATE 1 G: 1 TABLET ORAL at 16:18

## 2023-09-19 RX ADMIN — SODIUM CHLORIDE 75 ML/HR: 9 INJECTION, SOLUTION INTRAVENOUS at 21:43

## 2023-09-19 RX ADMIN — DIGOXIN 125 MCG: 125 TABLET ORAL at 13:19

## 2023-09-19 RX ADMIN — SODIUM CHLORIDE 75 ML/HR: 9 INJECTION, SOLUTION INTRAVENOUS at 09:18

## 2023-09-19 RX ADMIN — HYDROCODONE BITARTRATE AND ACETAMINOPHEN 1 TABLET: 5; 325 TABLET ORAL at 23:10

## 2023-09-19 RX ADMIN — HYDROCODONE BITARTRATE AND ACETAMINOPHEN 1 TABLET: 5; 325 TABLET ORAL at 13:19

## 2023-09-19 RX ADMIN — METOCLOPRAMIDE 5 MG: 5 TABLET ORAL at 13:21

## 2023-09-19 RX ADMIN — METOCLOPRAMIDE 5 MG: 5 TABLET ORAL at 16:20

## 2023-09-19 RX ADMIN — GABAPENTIN 600 MG: 600 TABLET, FILM COATED ORAL at 09:12

## 2023-09-19 RX ADMIN — Medication 10 ML: at 09:12

## 2023-09-19 RX ADMIN — METOPROLOL SUCCINATE 12.5 MG: 25 TABLET, EXTENDED RELEASE ORAL at 18:13

## 2023-09-19 RX ADMIN — TRAZODONE HYDROCHLORIDE 25 MG: 50 TABLET ORAL at 00:01

## 2023-09-19 RX ADMIN — SENNOSIDES AND DOCUSATE SODIUM 2 TABLET: 50; 8.6 TABLET ORAL at 09:12

## 2023-09-19 RX ADMIN — SUCRALFATE 1 G: 1 TABLET ORAL at 13:19

## 2023-09-19 RX ADMIN — ENOXAPARIN SODIUM 70 MG: 100 INJECTION SUBCUTANEOUS at 06:37

## 2023-09-19 RX ADMIN — GABAPENTIN 600 MG: 600 TABLET, FILM COATED ORAL at 20:28

## 2023-09-19 RX ADMIN — ACETAMINOPHEN 650 MG: 325 TABLET, FILM COATED ORAL at 05:02

## 2023-09-19 RX ADMIN — HYDROCODONE BITARTRATE AND ACETAMINOPHEN 1 TABLET: 5; 325 TABLET ORAL at 06:37

## 2023-09-19 RX ADMIN — PANTOPRAZOLE SODIUM 40 MG: 40 INJECTION, POWDER, LYOPHILIZED, FOR SOLUTION INTRAVENOUS at 09:12

## 2023-09-19 NOTE — DISCHARGE PLACEMENT REQUEST
"Catalina Carrera (83 y.o. Female)       Date of Birth   1940    Social Security Number       Address   [address] [place] [address] [place] [facility]    Home Phone   [phone]    MRN   [id number]       Denominational   None    Marital Status                               Admission Date   9/18/23    Admission Type   Emergency    Admitting Provider   Pa Stewart MD    Attending Provider   Pa Stewart MD    Department, Room/Bed   [facility] OBSERVATION, 112/1       Discharge Date       Discharge Disposition       Discharge Destination                                 Attending Provider: Pa Stewart MD    Allergies: Baclofen, Codeine, Ibuprofen, Methocarbamol, Naproxen, Tizanidine Hcl, Tolmetin    Isolation: None   Infection: None   Code Status: CPR    Ht: 160 cm (63\")   Wt: 74.1 kg (163 lb 5.8 oz)    Admission Cmt: None   Principal Problem: Palpitations [R00.2]                   Active Insurance as of 9/18/2023       Primary Coverage       Payor Plan Insurance Group Employer/Plan Group    MEDICARE MEDICARE A & B        Payor Plan Address Payor Plan Phone Number Payor Plan Fax Number Effective Dates    [address] [phone]  4/1/2006 - None Entered    [facility] [id number]         Subscriber Name Subscriber Birth Date Member ID       CATALINA CARRERA 1940 [id number]               Secondary Coverage       Payor Plan Insurance Group Employer/Plan Group    ANTHEM BLUE CROSS ANTHEM BLUE CROSS BLUE SHIELD PPO [id number]       Payor Plan Address Payor Plan Phone Number Payor Plan Fax Number Effective Dates    [address] [phone]  2/2/2022 - None Entered    [facility] [id number]         Subscriber Name Subscriber Birth Date Member ID       CATALINA CARRERA 1940 [id number]               Tertiary Coverage       Payor Plan Insurance Group Employer/Plan Group    [place]        Payor Plan Address Payor Plan Phone Number Payor Plan Fax Number Effective " Dates    [address]   5/1/2022 - None Entered    [place] [address]         Subscriber Name Subscriber Birth Date Member ID       RADHA CARRERA 1940 [id number]                     Emergency Contacts        (Rel.) Home Phone Work Phone Mobile Phone    MADHAV DEAN (Son) [phone] -- [phone]

## 2023-09-19 NOTE — PLAN OF CARE
Goal Outcome Evaluation:              Outcome Evaluation: No further c/o CP.  Af is baseline--periods of tachycardia w/exertion, but rate resolves at rest.  Pending myvoiew at this time.  Continues NPO.  Monitoring.

## 2023-09-19 NOTE — THERAPY EVALUATION
Patient Name: Catalina Moreno  : 1940    MRN: [id number]                              Today's Date: 2023       Admit Date: 2023    Visit Dx:     ICD-10-CM ICD-9-CM   1. Palpitations  R00.2 785.1   2. Nausea  R11.0 787.02     Patient Active Problem List   Diagnosis    Arthritis    Other long term (current) drug therapy    Polyarthralgia    Sacroiliitis    Depressive disorder    Primary fibromyalgia syndrome    Lightheadedness    Hypothyroidism    Lumbar radiculopathy    Nonrheumatic tricuspid valve regurgitation    Atrial fibrillation with RVR    Acquired spondylolisthesis of lumbosacral region    Vitamin D deficiency    Stage 3a chronic kidney disease    DDD (degenerative disc disease), cervical    Chronic low back pain    Cervical stenosis of spinal canal    Anemia    Polypharmacy    Age-related cognitive decline    Edema, unspecified    Generalized anxiety disorder    History of falling    Insomnia, unspecified    Need for assistance with personal care    Other dysphagia    Polyneuropathy, unspecified    Unsteadiness on feet    Restless legs syndrome    Paroxysmal atrial fibrillation    Chronic pain disorder    Other intervertebral disc degeneration, lumbar region    Essential (primary) hypertension    Spondylolisthesis, lumbar region    Gastro-esophageal reflux disease without esophagitis    Pulmonary hypertension, unspecified    Chronic systolic CHF (congestive heart failure)    Coronary artery disease involving native coronary artery of native heart without angina pectoris    Moderate mitral regurgitation    Class 1 obesity due to excess calories with serious comorbidity and body mass index (BMI) of 30.0 to 30.9 in adult    Hypotension    Sick sinus syndrome    Presence of cardiac pacemaker    Diarrhea with dehydration    Chronic HFrEF (heart failure with reduced ejection fraction)    Atrial flutter with rapid ventricular response    Nausea    Pain of upper abdomen    Palpitations     Past  Medical History:   Diagnosis Date    Acute kidney injury 07/22/2022    Anxiety     Arthritis     Atrial fibrillation     paroxysmal    Broken shoulder     left-- due to fall 11-7-19 was at [facility]    Chronic systolic CHF (congestive heart failure) 01/05/2023    EF 36-40%    Coronary artery disease involving native coronary artery of native heart without angina pectoris 04/02/2021    nonobstructive    DDD (degenerative disc disease), lumbar     Depression     Edema     9/2020 foot    GERD (gastroesophageal reflux disease)     H/O fall     Heart failure with mid-range ejection fraction 03/05/2022    EF 45% per 2D echo    Hypertension     Hypothyroidism     Insomnia     Low back pain     Moderate mitral regurgitation 1/5/2023    Neuropathy     Pain in both feet     PONV (postoperative nausea and vomiting)     Pulmonary hypertension     Radiculopathy     Restless legs     Spondylolisthesis     Stage 3a chronic kidney disease     Urinary incontinence      Past Surgical History:   Procedure Laterality Date    BACK SURGERY  07/19/2018    PDC &  PSF L3-L5 insitu    CARDIAC CATHETERIZATION N/A 4/2/2021    Procedure: Cardiac Catheterization/Vascular Study;  Surgeon: Barrett Evans MD;  Location: [facility] CATH INVASIVE LOCATION;  Service: Cardiovascular;  Laterality: N/A;    CARDIAC ELECTROPHYSIOLOGY PROCEDURE N/A 1/17/2023    Procedure: Pacemaker DC new;  Surgeon: Baljeet Valero MD;  Location: [facility] CATH INVASIVE LOCATION;  Service: Cardiovascular;  Laterality: N/A;    CHOLECYSTECTOMY      COLONOSCOPY      ENDOSCOPY N/A 9/14/2023    Procedure: ESOPHAGOGASTRODUODENOSCOPY;  Surgeon: Ulysses Lamas MD;  Location: [facility] ENDOSCOPY;  Service: Gastroenterology;  Laterality: N/A;  gastritis, inflammatory gastric polyp    HYSTERECTOMY      ROTATOR CUFF REPAIR Left       General Information       Row Name 09/19/23 1118          Physical Therapy Time and Intention    Document Type evaluation  -MB     Mode of  Treatment physical therapy  -MB       Row Name 09/19/23 1118          General Information    Patient Profile Reviewed yes  -MB     Prior Level of Function independent:;all household mobility;community mobility;gait;transfer;ADL's;home management  -MB     Existing Precautions/Restrictions no known precautions/restrictions  -MB     Barriers to Rehab none identified  -MB       Row Name 09/19/23 1118          Living Environment    People in Home alone  -MB       Row Name 09/19/23 1118          Home Main Entrance    Number of Stairs, Main Entrance one  -MB       Row Name 09/19/23 1118          Stairs Within Home, Primary    Number of Stairs, Within Home, Primary none  -MB       Row Name 09/19/23 1118          Cognition    Orientation Status (Cognition) oriented x 4  -MB       Row Name 09/19/23 1118          Safety Issues, Functional Mobility    Impairments Affecting Function (Mobility) balance;endurance/activity tolerance  -MB               User Key  (r) = Recorded By, (t) = Taken By, (c) = Cosigned By      Initials Name Provider Type    Albino Reynolds, DEBBI Physical Therapist                   Mobility       Row Name 09/19/23 1123          Bed Mobility    Bed Mobility bed mobility (all) activities  -MB     All Activities, [place] (Bed Mobility) standby assist  -MB     Assistive Device (Bed Mobility) bed rails;head of bed elevated  -MB       Row Name 09/19/23 1123          Sit-Stand Transfer    Sit-Stand [place] (Transfers) contact guard  -MB     Comment, (Sit-Stand Transfer) no AD  -MB       Row Name 09/19/23 1123          Gait/Stairs (Locomotion)    [place] Level (Gait) contact guard  -MB     Patient was able to Ambulate yes  -MB     Distance in Feet (Gait) 60  -MB     Comment, (Gait/Stairs) 60' CGA with HHAx1, mild balance deficits at baseline  -MB               User Key  (r) = Recorded By, (t) = Taken By, (c) = Cosigned By      Initials Name Provider Type    Albino Reynolds, PT Physical Therapist                    Obj/Interventions       Row Name 09/19/23 1124          Range of Motion Comprehensive    General Range of Motion no range of motion deficits identified  -MB       Row Name 09/19/23 1124          Strength Comprehensive (MMT)    Comment, General Manual Muscle Testing (MMT) Assessment BLE Strength 4/5 grossly  -MB       Row Name 09/19/23 1124          Balance    Balance Assessment sitting static balance;sitting dynamic balance;sit to stand dynamic balance;standing static balance;standing dynamic balance  -MB     Static Sitting Balance independent  -MB     Dynamic Sitting Balance independent  -MB     Sit to Stand Dynamic Balance contact guard  -MB     Static Standing Balance standby assist  -MB     Dynamic Standing Balance contact guard  -MB       Row Name 09/19/23 1124          Sensory Assessment (Somatosensory)    Sensory Assessment (Somatosensory) other (see comments)  Pt reports neuropathy in bilateral feet  -MB               User Key  (r) = Recorded By, (t) = Taken By, (c) = Cosigned By      Initials Name Provider Type    Albino Reynolds, PT Physical Therapist                   Goals/Plan    No documentation.                  Clinical Impression       Row Name 09/19/23 1137          Pain    Pretreatment Pain Rating 0/10 - no pain  -MB     Posttreatment Pain Rating 0/10 - no pain  -MB       Row Name 09/19/23 1146 09/19/23 1137       Plan of Care Review    Plan of Care Reviewed With -- patient  -MB    Progress -- improving  -MB    Outcome Evaluation Pt is an 82 y/o F admitted to [facility] on 9/18/23 with complaints of tachycardia and heart palpitations for several days. She has also reported increased urination, nausea, and vomiting. XR Chest (-) and EKG significant for HR of 115bpm and A-fib. Pt diagnosed with nausea and palpitations. She is currently living home alone in [facility] with one step to enter. At baseline, pt reports being IND with household mobility, community ambulation, and ADLs with use of SPC. Pt  is currently lying supine and is AAOx4. She completes bed mobility SBA, transfers CGA, and amb 60' CGA with HHA. Pt exhibits mild balance deficit, but reports this being her baseline functioning. She reports at home she would likely be using her RW at this time during weakness and feeling unsteady. With use of RW, pt will not present with balance deficits or concerns with safety in the household. Pt slightly tachycardic during session with HR reaching 132bpm at the highest point. Once pt medically stable, she will be safe to d/c back home with continuous usage of RW>SPC for the time being. PT currently recommending HHPT at d/c to address baseline balance concerns and decrease risk for falls in the household. PT currently completing orders at this time and new orders will need to be placed if functional decline noted during stay.  -MB --      Row Name 09/19/23 1137          Therapy Assessment/Plan (PT)    Criteria for Skilled Interventions Met (PT) no  -MB     Therapy Frequency (PT) evaluation only  -MB     Predicted Duration of Therapy Intervention (PT) eval only  -MB       Row Name 09/19/23 1137          Vital Signs    Pretreatment Heart Rate (beats/min) 132  -MB     Intratreatment Heart Rate (beats/min) 126  -MB     Posttreatment Heart Rate (beats/min) 118  -MB     O2 Delivery Pre Treatment room air  -MB     O2 Delivery Intra Treatment room air  -MB     O2 Delivery Post Treatment room air  -MB     Pre Patient Position Supine  -MB     Intra Patient Position Standing  -MB     Post Patient Position Supine  -MB       Row Name 09/19/23 1137          Positioning and Restraints    Pre-Treatment Position in bed  -MB     Post Treatment Position bed  -MB     In Bed notified nsg;supine;call light within reach;encouraged to call for assist;exit alarm on  -MB               User Key  (r) = Recorded By, (t) = Taken By, (c) = Cosigned By      Initials Name Provider Type    Albino Reynolds, PT Physical Therapist                    Outcome Measures       Row Name 09/19/23 1138 09/19/23 0743       How much help from another person do you currently need...    Turning from your back to your side while in flat bed without using bedrails? 4  -MB 3  -MM    Moving from lying on back to sitting on the side of a flat bed without bedrails? 4  -MB 4  -MM    Moving to and from a bed to a chair (including a wheelchair)? 4  -MB 3  -MM    Standing up from a chair using your arms (e.g., wheelchair, bedside chair)? 4  -MB 3  -MM    Climbing 3-5 steps with a railing? 3  -MB 3  -MM    To walk in hospital room? 3  -MB 3  -MM    AM-PAC 6 Clicks Score (PT) 22  -MB 19  -MM    Highest level of mobility 7 --> Walked 25 feet or more  -MB 6 --> Walked 10 steps or more  -MM      Row Name 09/19/23 1138          Functional Assessment    Outcome Measure Options AM-PAC 6 Clicks Basic Mobility (PT)  -MB               User Key  (r) = Recorded By, (t) = Taken By, (c) = Cosigned By      Initials Name Provider Type    Elder Harrell, RN Registered Nurse    Albino Reynolds, PT Physical Therapist                                 Physical Therapy Education       Title: PT OT SLP Therapies (Done)       Topic: Physical Therapy (Done)       Point: Mobility training (Done)       Learning Progress Summary             Patient Acceptance, E,TB, VU by MB at 9/19/2023 1138    Acceptance, E,TB, VU by CHANDLER at 9/19/2023 1112    Acceptance, E, VU by  at 9/18/2023 1710                         Point: Body mechanics (Done)       Learning Progress Summary             Patient Acceptance, E,TB, VU by MB at 9/19/2023 1138    Acceptance, E,TB, VU by CHANDLER at 9/19/2023 1112    Acceptance, E, VU by  at 9/18/2023 1710                         Point: Precautions (Done)       Learning Progress Summary             Patient Acceptance, E,TB, VU by MB at 9/19/2023 1138    Acceptance, E,TB, VU by CHANDLER at 9/19/2023 1112    Acceptance, E, VU by  at 9/18/2023 1710                                         User Key        Initials Effective Dates Name Provider Type Discipline     07/11/22 -  Elder Barajas, RN Registered Nurse Nurse     07/05/23 -  Ariadne Cohen, RN Registered Nurse Nurse    MB 06/06/23 -  Albino Chau, DEBBI Physical Therapist PT                  PT Recommendation and Plan     Plan of Care Reviewed With: patient  Progress: improving  Outcome Evaluation: Pt is an 82 y/o F admitted to [facility] on 9/18/23 with complaints of tachycardia and heart palpitations for several days. She has also reported increased urination, nausea, and vomiting. XR Chest (-) and EKG significant for HR of 115bpm and A-fib. Pt diagnosed with nausea and palpitations. She is currently living home alone in [facility] with one step to enter. At baseline, pt reports being IND with household mobility, community ambulation, and ADLs with use of SPC. Pt is currently lying supine and is AAOx4. She completes bed mobility SBA, transfers CGA, and amb 60' CGA with HHA. Pt exhibits mild balance deficit, but reports this being her baseline functioning. She reports at home she would likely be using her RW at this time during weakness and feeling unsteady. With use of RW, pt will not present with balance deficits or concerns with safety in the household. Pt slightly tachycardic during session with HR reaching 132bpm at the highest point. Once pt medically stable, she will be safe to d/c back home with continuous usage of RW>SPC for the time being. PT currently recommending HHPT at d/c to address baseline balance concerns and decrease risk for falls in the household. PT currently completing orders at this time and new orders will need to be placed if functional decline noted during stay.     Time Calculation:   PT Evaluation Complexity  History, PT Evaluation Complexity: 1-2 personal factors and/or comorbidities  Examination of Body Systems (PT Eval Complexity): total of 3 or more elements  Clinical Presentation (PT Evaluation Complexity): evolving  Clinical Decision  Making (PT Evaluation Complexity): moderate complexity  Overall Complexity (PT Evaluation Complexity): moderate complexity     PT Charges       Row Name 09/19/23 1139             Time Calculation    Start Time 1015  -MB      Stop Time 1038  -MB      Time Calculation (min) 23 min  -MB      PT Received On 09/19/23  -MB         Time Calculation- PT    Total Timed Code Minutes- PT 0 minute(s)  -MB                User Key  (r) = Recorded By, (t) = Taken By, (c) = Cosigned By      Initials Name Provider Type    Albino Reynolds, PT Physical Therapist                  Therapy Charges for Today       Code Description Service Date Service Provider Modifiers Qty    33835073403 HC PT EVAL MOD COMPLEXITY 4 9/19/2023 Albino Chau, PT GP 1            PT G-Codes  Outcome Measure Options: AM-PAC 6 Clicks Basic Mobility (PT)  AM-PAC 6 Clicks Score (PT): 22  PT Discharge Summary  Anticipated Discharge Disposition (PT): home with home health    Albino Chau, DEBBI  9/19/2023

## 2023-09-19 NOTE — PLAN OF CARE
Goal Outcome Evaluation:              Outcome Evaluation: Pt admitted for chest pain. Pt with no complaints of chest pain this shift. NPO since midnight for stress test this AM. VSS, Care continues.

## 2023-09-19 NOTE — CASE MANAGEMENT/SOCIAL WORK
Discharge Planning Assessment  [facility]     Patient Name: Catalina Moreno  MRN: [id number]  Today's Date: 9/19/2023    Admit Date: 9/18/2023    Plan: Anticipate return home with [facility]. Patient is current with [facility] (no order needed for OBS status).   Discharge Needs Assessment       Row Name 09/19/23 1329       Living Environment    People in Home alone    Current Living Arrangements home    Potentially Unsafe Housing Conditions none    Primary Care Provided by self    Provides Primary Care For no one    Family Caregiver if Needed child(evangelista), adult    Family Caregiver Names son Jordan.    Quality of Family Relationships other (see comments)  Son is involved when he can be but is a truckdriver and is out of town for long stretches of time.    Able to Return to Prior Arrangements yes       Resource/Environmental Concerns    Resource/Environmental Concerns none    Transportation Concerns rides, unreliable from others       Transition Planning    Patient/Family Anticipates Transition to home    Patient/Family Anticipated Services at Transition home health care    Transportation Anticipated other (see comments)  patient may need [facility] van transportation       Discharge Needs Assessment    Readmission Within the Last 30 Days no previous admission in last 30 days    Equipment Currently Used at Home walker, rolling;cane, straight;bp cuff    Provided Post Acute Provider List? N/A    Provided Post Acute Provider Quality & Resource List? N/A                   Discharge Plan       Row Name 09/19/23 6222       Plan    Plan Anticipate return home with [facility]. Patient is current with [facility] (no order needed for OBS status).    Plan Comments CM met with patient at the bedside. Confirmed PCP, insurance, and pharmacy. Patient denies any difficulty affording medications. Patient is current with [facility]. CM placed referral in EPIC basket for [facility] and Savanah barron to confirm patient is current. Patient does have a  car but she is not currently driving as she does not feel safe behind the wheel. Patient is IADLS with the exception of getting to the store to get groceries. Patient states her son Jordan gets groceries for patient; however, Jordan is a  and has to be away from home for long periods of time. PT recommend home with home health. DC barriers: pending myoview results.                  Continued Care and Services - Admitted Since 9/18/2023       Home Medical Care       Service Provider Request Status Selected Services Address Phone Fax Patient Preferred     [facility] Accepted N/A [address] [facility] [place] [id number] [phone] --                       Expected Discharge Date and Time       Expected Discharge Date Expected Discharge Time    Sep 19, 2023            Demographic Summary       Row Name 09/19/23 1326       General Information    Admission Type observation    Arrived From home    Reason for Consult care coordination/care conference;discharge planning    Preferred Language English       Contact Information    Permission Granted to Share Info With     Contact Information Obtained for                    Functional Status       Row Name 09/19/23 1327       Functional Status    Usual Activity Tolerance moderate    Current Activity Tolerance moderate       Physical Activity    On average, how many days per week do you engage in moderate to strenuous exercise (like a brisk walk)? 0 days    On average, how many minutes do you engage in exercise at this level? 0 min    Number of minutes of exercise per week 0       Functional Status, IADL    Medications independent    Meal Preparation independent    Housekeeping independent    Laundry independent    Shopping other (see comments)    IADL Comments Patient is not currently driving due to her not feeling safe to drive. Patient states her son gets her groceries for her; however patient son is a truckdriver and is out  of [facility] for long stretches of time.       Mental Status Summary    Recent Changes in Mental Status/Cognitive Functioning no changes                Ferny Grace RN      Office Phone: [phone]

## 2023-09-19 NOTE — H&P
[facility] H&P    Patient Name: Catalina Moreno  : 1940  MRN: [id number]  Primary Care Physician: Rubio Dover MD  Date of admission: 2023     Patient Care Team:  Rubio Dover MD as PCP - General (Family Medicine)  Flash Gonzalez MD as Consulting Physician (Cardiology)          Subjective   History Present Illness     Chief Complaint:   Chief Complaint   Patient presents with    Irregular Heart Beat     Palpitations    Ms. Moreno is a 83 y.o.  presents to [facility] complaining of palpitations       History of Present Illness    ED 23: Patient is an 83-year-old female presenting from home with reports of elevated heart rate and palpitations over the past 3 to 4 days. She reports associated nausea as well. Patient reports increased frequency with urination but denies any dysuria. She also reports she had a few episodes of diarrhea that has improved over the last day. Denies any significant abdominal pain. No chest pain recent cough or congestion. She reports chronic shortness of breath only with exertion and denies significant change. States she normally is on digoxin and Eliquis. She has not taken her morning medications.     Observation 23: Patient is a an 83-year-old female presenting to the hospital with complaints of palpitations and weakness.  Patient reports nausea and fatigue in the past week with loss of appetite.  Patient states she had no fever vomiting or abdominal pain but states that she had the flu.  Respiratory panel has been checked and is negative.  Patient was recently admitted for atrial fibrillation.  Patient states her son is a  and manages her medications and she had not had him  medications for atrial fibrillation.  Patient states she has restless legs and for weakness but no falls or injury    Review of Systems   Constitutional: Positive for malaise/fatigue.   HENT: Negative.     Eyes: Negative.     Cardiovascular:  Positive for palpitations.   Respiratory: Negative.     Endocrine: Negative.    Hematologic/Lymphatic: Negative.    Skin: Negative.    Musculoskeletal:  Positive for muscle cramps.   Gastrointestinal: Negative.    Genitourinary: Negative.    Neurological:  Positive for light-headedness and weakness.   Psychiatric/Behavioral: Negative.     Allergic/Immunologic: Negative.          Personal History     Past Medical History:   Past Medical History:   Diagnosis Date    Acute kidney injury 07/22/2022    Anxiety     Arthritis     Atrial fibrillation     paroxysmal    Broken shoulder     left-- due to fall 11-7-19 was at [facility]    Chronic systolic CHF (congestive heart failure) 01/05/2023    EF 36-40%    Coronary artery disease involving native coronary artery of native heart without angina pectoris 04/02/2021    nonobstructive    DDD (degenerative disc disease), lumbar     Depression     Edema     9/2020 foot    GERD (gastroesophageal reflux disease)     H/O fall     Heart failure with mid-range ejection fraction 03/05/2022    EF 45% per 2D echo    Hypertension     Hypothyroidism     Insomnia     Low back pain     Moderate mitral regurgitation 1/5/2023    Neuropathy     Pain in both feet     PONV (postoperative nausea and vomiting)     Pulmonary hypertension     Radiculopathy     Restless legs     Spondylolisthesis     Stage 3a chronic kidney disease     Urinary incontinence        Surgical History:      Past Surgical History:   Procedure Laterality Date    BACK SURGERY  07/19/2018    PDC &  PSF L3-L5 insitu    CARDIAC CATHETERIZATION N/A 4/2/2021    Procedure: Cardiac Catheterization/Vascular Study;  Surgeon: Barrett Evans MD;  Location: [facility] CATH INVASIVE LOCATION;  Service: Cardiovascular;  Laterality: N/A;    CARDIAC ELECTROPHYSIOLOGY PROCEDURE N/A 1/17/2023    Procedure: Pacemaker DC new;  Surgeon: Baljeet Valero MD;  Location: [facility] CATH INVASIVE LOCATION;  Service:  Cardiovascular;  Laterality: N/A;    CHOLECYSTECTOMY      COLONOSCOPY      ENDOSCOPY N/A 9/14/2023    Procedure: ESOPHAGOGASTRODUODENOSCOPY;  Surgeon: Ulysses Lamas MD;  Location: [facility] ENDOSCOPY;  Service: Gastroenterology;  Laterality: N/A;  gastritis, inflammatory gastric polyp    HYSTERECTOMY      ROTATOR CUFF REPAIR Left            Family History: family history includes Cancer in her father, paternal aunt, and paternal uncle; Diabetes in her son; Heart disease in her paternal aunt. Otherwise pertinent FHx was reviewed and unremarkable.     Social History:  reports that she has never smoked. She has never used smokeless tobacco. She reports that she does not drink alcohol and does not use drugs.      Medications:  Prior to Admission medications    Medication Sig Start Date End Date Taking? Authorizing Provider   acetaminophen (TYLENOL) 325 MG tablet Take 2 tablets by mouth Every 6 (Six) Hours As Needed for Mild Pain. Indications: Pain   Yes Elton Flores MD   apixaban (ELIQUIS) 5 MG tablet tablet Take 1 tablet by mouth Every 12 (Twelve) Hours. Indications: Atrial Fibrillation 1/19/23  Yes Zack Cifuentes MD   atorvastatin (LIPITOR) 10 MG tablet Take 1 tablet by mouth Daily.   Yes ProviderElton MD   metoprolol succinate XL (TOPROL-XL) 25 MG 24 hr tablet Take 1 tablet by mouth Daily. 9/8/23  Yes Heriberto Bradley MD   ondansetron (ZOFRAN) 4 MG tablet Take 1 tablet by mouth Every 8 (Eight) Hours As Needed for Nausea or Vomiting.   Yes ProviderElton MD   rOPINIRole (REQUIP) 0.25 MG tablet Take 1 tablet by mouth Every Night. Take 1 hour before bedtime.  Indications: Restless Leg Syndrome 9/8/23  Yes Heriberto Bradley MD   sacubitril-valsartan (Entresto) 24-26 MG tablet Take 1 tablet by mouth 2 (Two) Times a Day. 9/8/23  Yes Heriberto Bradley MD   traZODone (DESYREL) 50 MG tablet Take 0.5 tablets by mouth Daily As Needed (Takes at night time as needed). Indications: Trouble Sleeping    Yes Provider, MD Elton   loperamide (IMODIUM A-D) 2 MG tablet Take 1 tablet by mouth 4 (Four) Times a Day As Needed for Diarrhea. Indications: Diarrhea 8/29/22   ProviderElton MD       Allergies:    Allergies   Allergen Reactions    Baclofen Rash    Codeine Nausea Only    Ibuprofen Unknown (See Comments)     Patient doesn't know----Motin    Methocarbamol Unknown (See Comments)     Patient doesn't know    Naproxen Unknown (See Comments)     Pt. Doesn't know     Tizanidine Hcl Other (See Comments)     Syncope     Tolmetin Dizziness     Pt. Doesn't know-- same as Tolectin       Objective   Objective     Vital Signs  Temp:  [97.4 °F (36.3 °C)-98.7 °F (37.1 °C)] 97.8 °F (36.6 °C)  Heart Rate:  [] 84  Resp:  [15-16] 16  BP: ()/() 93/60  SpO2:  [92 %-98 %] 96 %  on  Flow (L/min):  [2] 2;   Device (Oxygen Therapy): room air  Body mass index is 28.94 kg/m².    Physical Exam  Vitals and nursing note reviewed.   Constitutional:       Appearance: Normal appearance.   HENT:      Head: Normocephalic and atraumatic.      Right Ear: External ear normal.      Left Ear: External ear normal.      Nose: Nose normal.      Mouth/Throat:      Pharynx: Oropharynx is clear.   Eyes:      Extraocular Movements: Extraocular movements intact.      Conjunctiva/sclera: Conjunctivae normal.      Pupils: Pupils are equal, round, and reactive to light.   Cardiovascular:      Rate and Rhythm: Normal rate. Rhythm irregular.   Pulmonary:      Effort: Pulmonary effort is normal.      Breath sounds: Normal breath sounds.   Abdominal:      General: Bowel sounds are normal.      Palpations: Abdomen is soft.   Musculoskeletal:         General: Tenderness present. Normal range of motion.      Cervical back: Normal range of motion.   Skin:     General: Skin is warm.      Capillary Refill: Capillary refill takes less than 2 seconds.   Neurological:      Mental Status: She is alert and oriented to person, place, and time.       Motor: Weakness present.   Psychiatric:         Mood and Affect: Mood normal.         Behavior: Behavior normal.         Thought Content: Thought content normal.         Judgment: Judgment normal.         Results Review:  I have personally reviewed most recent cardiac tracings, lab results, microbiology results, and radiology images and interpretations and agree with findings, most notably: CBC, CMP, troponin, BNP, chest x-ray, EKG.    Results from last 7 days   Lab Units 09/19/23  0337 09/18/23  1344   WBC 10*3/mm3 5.40 6.40   HEMOGLOBIN g/dL 11.3* 12.4   HEMATOCRIT % 35.1 38.5   PLATELETS 10*3/mm3 241 254   INR   --  1.00     Results from last 7 days   Lab Units 09/19/23  0337 09/18/23  1541 09/18/23  1344 09/13/23  0255 09/12/23  1650   SODIUM mmol/L 140  --  141   < >  --    POTASSIUM mmol/L 3.7  --  4.0   < >  --    CHLORIDE mmol/L 106  --  104   < >  --    CO2 mmol/L 25.0  --  25.0   < >  --    BUN mg/dL 13  --  13   < >  --    CREATININE mg/dL 0.90  --  0.78   < >  --    GLUCOSE mg/dL 95  --  101*   < >  --    CALCIUM mg/dL 8.9  --  9.3   < >  --    ALT (SGPT) U/L  --   --  13   < >  --    AST (SGOT) U/L  --   --  19   < >  --    HSTROP T ng/L  --  10* 9  --  10*   PROBNP pg/mL  --  1,318.0  --   --   --     < > = values in this interval not displayed.     Estimated Creatinine Clearance: 46.5 mL/min (by C-G formula based on SCr of 0.9 mg/dL).  Brief Urine Lab Results  (Last result in the past 365 days)        Color   Clarity   Blood   Leuk Est   Nitrite   Protein   CREAT   Urine HCG        09/18/23 1518 Yellow   Clear   Negative   Negative   Negative   Negative                   Microbiology Results (last 10 days)       Procedure Component Value - Date/Time    Respiratory Panel PCR w/COVID-19(SARS-CoV-2) WENDI/MIKAYLA/KEVEN/PAD/COR/MAD/DWIGHT In-House, NP Swab in UTM/VTM, 3-4 HR TAT - Swab, Nasopharynx [[id number]]  (Normal) Collected: 09/18/23 1414    Lab Status: Final result Specimen: Swab from Nasopharynx Updated:  09/18/23 1505     ADENOVIRUS, PCR Not Detected     Coronavirus 229E Not Detected     Coronavirus HKU1 Not Detected     Coronavirus NL63 Not Detected     Coronavirus OC43 Not Detected     COVID19 Not Detected     Human Metapneumovirus Not Detected     Human Rhinovirus/Enterovirus Not Detected     Influenza A PCR Not Detected     Influenza B PCR Not Detected     Parainfluenza Virus 1 Not Detected     Parainfluenza Virus 2 Not Detected     Parainfluenza Virus 3 Not Detected     Parainfluenza Virus 4 Not Detected     RSV, PCR Not Detected     Bordetella pertussis pcr Not Detected     Bordetella parapertussis PCR Not Detected     Chlamydophila pneumoniae PCR Not Detected     Mycoplasma pneumo by PCR Not Detected    Narrative:      In the setting of a positive respiratory panel with a viral infection PLUS a negative procalcitonin without other underlying concern for bacterial infection, consider observing off antibiotics or discontinuation of antibiotics and continue supportive care. If the respiratory panel is positive for atypical bacterial infection (Bordetella pertussis, Chlamydophila pneumoniae, or Mycoplasma pneumoniae), consider antibiotic de-escalation to target atypical bacterial infection.    Respiratory Panel PCR w/COVID-19(SARS-CoV-2) WENDI/MIKAYLA/KEVEN/PAD/COR/MAD/DWIGHT In-House, NP Swab in UTM/VTM, 3-4 HR TAT - Swab, Nasopharynx [[id number]]  (Normal) Collected: 09/12/23 1436    Lab Status: Final result Specimen: Swab from Nasopharynx Updated: 09/12/23 1529     ADENOVIRUS, PCR Not Detected     Coronavirus 229E Not Detected     Coronavirus HKU1 Not Detected     Coronavirus NL63 Not Detected     Coronavirus OC43 Not Detected     COVID19 Not Detected     Human Metapneumovirus Not Detected     Human Rhinovirus/Enterovirus Not Detected     Influenza A PCR Not Detected     Influenza B PCR Not Detected     Parainfluenza Virus 1 Not Detected     Parainfluenza Virus 2 Not Detected     Parainfluenza Virus 3 Not Detected      Parainfluenza Virus 4 Not Detected     RSV, PCR Not Detected     Bordetella pertussis pcr Not Detected     Bordetella parapertussis PCR Not Detected     Chlamydophila pneumoniae PCR Not Detected     Mycoplasma pneumo by PCR Not Detected    Narrative:      In the setting of a positive respiratory panel with a viral infection PLUS a negative procalcitonin without other underlying concern for bacterial infection, consider observing off antibiotics or discontinuation of antibiotics and continue supportive care. If the respiratory panel is positive for atypical bacterial infection (Bordetella pertussis, Chlamydophila pneumoniae, or Mycoplasma pneumoniae), consider antibiotic de-escalation to target atypical bacterial infection.            ECG/EMG Results (most recent)       Procedure Component Value Units Date/Time    SCANNED - TELEMETRY   [[id number]] Resulted: 09/18/23     Updated: 09/19/23 0658    SCANNED - TELEMETRY   [[id number]] Resulted: 09/18/23     Updated: 09/19/23 0658    SCANNED - TELEMETRY   [[id number]] Resulted: 09/18/23     Updated: 09/19/23 0731    ECG 12 Lead Tachycardia [[id number]] Collected: 09/18/23 1317     Updated: 09/19/23 0919     QT Interval 341 ms      QTC Interval 473 ms     Narrative:      HEART RATE= 115  bpm  RR Interval= 519  ms  AZ Interval=   ms  P Horizontal Axis=   deg  P Front Axis=   deg  QRSD Interval= 70  ms  QT Interval= 341  ms  QTcB= 473  ms  QRS Axis= 66  deg  T Wave Axis= -76  deg  - ABNORMAL ECG -  Atrial fibrillation  When compared with ECG of 12-Sep-2023 14:31:11,  Significant repolarization change  Electronically Signed By: Pa Stewart ([facility]) 19-Sep-2023 09:19:03  Date and Time of Study: 2023-09-18 13:17:21            Results for orders placed during the hospital encounter of 03/04/22    Duplex Venous Lower Extremity - Bilateral CAR    Interpretation Summary  · Normal bilateral lower extremity venous duplex scan.  · Nonvascular cystic mass located in the left posterior mid  calf.      Results for orders placed during the hospital encounter of 09/05/23    Adult Transthoracic Echo Complete W/ Cont if Necessary Per Protocol    Interpretation Summary    Left ventricular ejection fraction appears to be 51 - 55%.    The right ventricular cavity is moderately dilated.    The left atrial cavity is moderately dilated.    The right atrial cavity is mildly  dilated.    There is calcification of the aortic valve.    Moderate aortic valve regurgitation is present.    Estimated right ventricular systolic pressure from tricuspid regurgitation is mildly elevated (35-45 mmHg).      XR Chest 1 View    Result Date: 9/18/2023  Impression: No acute process identified Electronically Signed: Kirill Catalan MD  9/18/2023 1:16 PM CDT  Workstation ID: [id number]    XR Chest 1 View    Result Date: 9/12/2023  Impression: No acute cardiopulmonary findings. Electronically Signed: Quan Cazares MD  9/12/2023 2:58 PM EDT  Workstation ID: [id number]       Estimated Creatinine Clearance: 46.5 mL/min (by C-G formula based on SCr of 0.9 mg/dL).    Assessment & Plan   Assessment/Plan       Active Hospital Problems    Diagnosis  POA    **Palpitations [R00.2]  Yes      Resolved Hospital Problems   No resolved problems to display.       Atrial fibrillation   - Hx of Afib, pacemaker  - EKG Reviewed, showed atrial fib with heart rate 117  - ECHO EF 51-55, moderate aortic valve regurgitation and moderate dilated RV (09/07/2023)  - Continue metoprolol, Entresto, Eliquis  - Resume digoxin  - Continue IV fluids from ED  - Respiratory viral panel negative  - Digoxin level 0.60  - Continuous cardiac monitoring     Hypertension  -Moderately controlled   BP Readings from Last 1 Encounters:   09/19/23 93/60   -Hold blood pressure medications for SBP below 100  - Continue Toprol and Entresto  - Monitor while admitted    Anxiety/Restless leg syndrome  - resume gabapentin and requip      GERD  - resume PPI, Reglan, and Carafate      VTE  Prophylaxis -   Mechanical Order History:        Ordered        09/18/23 1638  Place Sequential Compression Device  Once            09/18/23 1638  Maintain Sequential Compression Device  Continuous                          Pharmalogical Order History:        Ordered     Dose Route Frequency Stop    09/18/23 1706  Enoxaparin Sodium (LOVENOX) syringe 70 mg         1 mg/kg SC Every 12 Hours --    09/18/23 1659  Pharmacy to Dose enoxaparin (LOVENOX)         -- XX Continuous PRN --                    CODE STATUS:    Code Status and Medical Interventions:   Ordered at: 09/18/23 1603     Level Of Support Discussed With:    Patient     Code Status (Patient has no pulse and is not breathing):    CPR (Attempt to Resuscitate)     Medical Interventions (Patient has pulse or is breathing):    Full Support       This patient has been examined wearing personal protective equipment.     I discussed the patient's findings and my recommendations with patient, family, nursing staff, primary care team, and consulting provider.      Signature:Electronically signed by KALYANI Langston, 09/19/23, 12:36 PM EDT.          I spent 40 minutes caring for Catalina on this date of service. This time includes time spent by me in the following activities: reviewing tests, obtaining and/or reviewing a separately obtained history, performing a medically appropriate examination and/or evaluation, counseling and educating the patient/family/caregiver, ordering medications, tests, or procedures, referring and communicating with other health care professionals, documenting information in the medical record, independently interpreting results and communicating that information with the patient/family/caregiver, and care coordination.

## 2023-09-19 NOTE — PLAN OF CARE
Goal Outcome Evaluation:  Plan of Care Reviewed With: patient        Progress: improving   Pt is an 84 y/o F admitted to [facility] on 9/18/23 with complaints of tachycardia and heart palpitations for several days. She has also reported increased urination, nausea, and vomiting. XR Chest (-) and EKG significant for HR of 115bpm and A-fib. Pt diagnosed with nausea and palpitations. She is currently living home alone in [facility] with one step to enter. At baseline, pt reports being IND with household mobility, community ambulation, and ADLs with use of SPC. Pt is currently lying supine and is AAOx4. She completes bed mobility SBA, transfers CGA, and amb 60' CGA with HHA. Pt exhibits mild balance deficit, but reports this being her baseline functioning. She reports at home she would likely be using her RW at this time during weakness and feeling unsteady. With use of RW, pt will not present with balance deficits or concerns with safety in the household. Pt slightly tachycardic during session with HR reaching 132bpm at the highest point. Once pt medically stable, she will be safe to d/c back home with continuous usage of RW>SPC for the time being. PT currently recommending HHPT at d/c to address baseline balance concerns and decrease risk for falls in the household. PT currently completing orders at this time and new orders will need to be placed if functional decline noted during stay.    Anticipated Discharge Disposition (PT): home with home health

## 2023-09-19 NOTE — OUTREACH NOTE
Medical Week 1 Survey      Flowsheet Row Responses   [facility] facility patient discharged from? [facility]   Does the patient have one of the following disease processes/diagnoses(primary or secondary)? Other   Week 1 attempt successful? No   Unsuccessful attempts Attempt 1   Revoke Readmitted            Kellie SCHWAB - Registered Nurse

## 2023-09-20 ENCOUNTER — READMISSION MANAGEMENT (OUTPATIENT)
Dept: CALL CENTER | Facility: HOSPITAL | Age: 83
End: 2023-09-20
Payer: MEDICARE

## 2023-09-20 ENCOUNTER — HOME CARE VISIT (OUTPATIENT)
Dept: HOME HEALTH SERVICES | Facility: HOME HEALTHCARE | Age: 83
End: 2023-09-20
Payer: MEDICARE

## 2023-09-20 VITALS
OXYGEN SATURATION: 95 % | HEART RATE: 70 BPM | BODY MASS INDEX: 28.95 KG/M2 | TEMPERATURE: 97.7 F | HEIGHT: 63 IN | WEIGHT: 163.36 LBS | SYSTOLIC BLOOD PRESSURE: 97 MMHG | DIASTOLIC BLOOD PRESSURE: 56 MMHG | RESPIRATION RATE: 14 BRPM

## 2023-09-20 LAB
ANION GAP SERPL CALCULATED.3IONS-SCNC: 6 MMOL/L (ref 5–15)
BASOPHILS # BLD AUTO: 0.1 10*3/MM3 (ref 0–0.2)
BASOPHILS NFR BLD AUTO: 1.9 % (ref 0–1.5)
BUN SERPL-MCNC: 17 MG/DL (ref 8–23)
BUN/CREAT SERPL: 20 (ref 7–25)
CALCIUM SPEC-SCNC: 8.1 MG/DL (ref 8.6–10.5)
CHLORIDE SERPL-SCNC: 108 MMOL/L (ref 98–107)
CO2 SERPL-SCNC: 27 MMOL/L (ref 22–29)
CREAT SERPL-MCNC: 0.85 MG/DL (ref 0.57–1)
DEPRECATED RDW RBC AUTO: 52.9 FL (ref 37–54)
DIGOXIN SERPL-MCNC: 0.7 NG/ML (ref 0.6–1.2)
EGFRCR SERPLBLD CKD-EPI 2021: 68.5 ML/MIN/1.73
EOSINOPHIL # BLD AUTO: 0.2 10*3/MM3 (ref 0–0.4)
EOSINOPHIL NFR BLD AUTO: 4.1 % (ref 0.3–6.2)
ERYTHROCYTE [DISTWIDTH] IN BLOOD BY AUTOMATED COUNT: 17.3 % (ref 12.3–15.4)
GLUCOSE SERPL-MCNC: 106 MG/DL (ref 65–99)
HCT VFR BLD AUTO: 32.4 % (ref 34–46.6)
HGB BLD-MCNC: 10.4 G/DL (ref 12–15.9)
LYMPHOCYTES # BLD AUTO: 2 10*3/MM3 (ref 0.7–3.1)
LYMPHOCYTES NFR BLD AUTO: 42.1 % (ref 19.6–45.3)
MCH RBC QN AUTO: 27.8 PG (ref 26.6–33)
MCHC RBC AUTO-ENTMCNC: 32.2 G/DL (ref 31.5–35.7)
MCV RBC AUTO: 86.2 FL (ref 79–97)
MONOCYTES # BLD AUTO: 0.5 10*3/MM3 (ref 0.1–0.9)
MONOCYTES NFR BLD AUTO: 9.9 % (ref 5–12)
NEUTROPHILS NFR BLD AUTO: 2 10*3/MM3 (ref 1.7–7)
NEUTROPHILS NFR BLD AUTO: 42 % (ref 42.7–76)
NRBC BLD AUTO-RTO: 0.1 /100 WBC (ref 0–0.2)
PLATELET # BLD AUTO: 226 10*3/MM3 (ref 140–450)
PMV BLD AUTO: 8.4 FL (ref 6–12)
POTASSIUM SERPL-SCNC: 4 MMOL/L (ref 3.5–5.2)
RBC # BLD AUTO: 3.76 10*6/MM3 (ref 3.77–5.28)
SODIUM SERPL-SCNC: 141 MMOL/L (ref 136–145)
WBC NRBC COR # BLD: 4.7 10*3/MM3 (ref 3.4–10.8)

## 2023-09-20 PROCEDURE — 80048 BASIC METABOLIC PNL TOTAL CA: CPT | Performed by: NURSE PRACTITIONER

## 2023-09-20 PROCEDURE — 25010000002 ONDANSETRON PER 1 MG: Performed by: NURSE PRACTITIONER

## 2023-09-20 PROCEDURE — 80162 ASSAY OF DIGOXIN TOTAL: CPT | Performed by: NURSE PRACTITIONER

## 2023-09-20 PROCEDURE — 96376 TX/PRO/DX INJ SAME DRUG ADON: CPT

## 2023-09-20 PROCEDURE — G0378 HOSPITAL OBSERVATION PER HR: HCPCS

## 2023-09-20 PROCEDURE — 25010000002 ENOXAPARIN PER 10 MG: Performed by: NURSE PRACTITIONER

## 2023-09-20 PROCEDURE — 85025 COMPLETE CBC W/AUTO DIFF WBC: CPT | Performed by: NURSE PRACTITIONER

## 2023-09-20 PROCEDURE — 96372 THER/PROPH/DIAG INJ SC/IM: CPT

## 2023-09-20 RX ORDER — DIGOXIN 125 MCG
125 TABLET ORAL
Qty: 30 TABLET | Refills: 0 | Status: SHIPPED | OUTPATIENT
Start: 2023-09-21 | End: 2023-10-21

## 2023-09-20 RX ADMIN — METOPROLOL SUCCINATE 12.5 MG: 25 TABLET, EXTENDED RELEASE ORAL at 17:17

## 2023-09-20 RX ADMIN — HYDROCODONE BITARTRATE AND ACETAMINOPHEN 1 TABLET: 5; 325 TABLET ORAL at 12:52

## 2023-09-20 RX ADMIN — ATORVASTATIN CALCIUM 10 MG: 10 TABLET, FILM COATED ORAL at 09:28

## 2023-09-20 RX ADMIN — SUCRALFATE 1 G: 1 TABLET ORAL at 12:24

## 2023-09-20 RX ADMIN — METOCLOPRAMIDE 5 MG: 5 TABLET ORAL at 08:18

## 2023-09-20 RX ADMIN — PANTOPRAZOLE SODIUM 40 MG: 40 INJECTION, POWDER, LYOPHILIZED, FOR SOLUTION INTRAVENOUS at 05:13

## 2023-09-20 RX ADMIN — ENOXAPARIN SODIUM 70 MG: 100 INJECTION SUBCUTANEOUS at 18:19

## 2023-09-20 RX ADMIN — ENOXAPARIN SODIUM 70 MG: 100 INJECTION SUBCUTANEOUS at 08:18

## 2023-09-20 RX ADMIN — ONDANSETRON 4 MG: 2 INJECTION INTRAMUSCULAR; INTRAVENOUS at 08:18

## 2023-09-20 RX ADMIN — ENOXAPARIN SODIUM 70 MG: 100 INJECTION SUBCUTANEOUS at 17:55

## 2023-09-20 RX ADMIN — METOCLOPRAMIDE 5 MG: 5 TABLET ORAL at 17:14

## 2023-09-20 RX ADMIN — SUCRALFATE 1 G: 1 TABLET ORAL at 08:18

## 2023-09-20 RX ADMIN — SUCRALFATE 1 G: 1 TABLET ORAL at 17:14

## 2023-09-20 RX ADMIN — GABAPENTIN 600 MG: 600 TABLET, FILM COATED ORAL at 09:28

## 2023-09-20 RX ADMIN — METOCLOPRAMIDE 5 MG: 5 TABLET ORAL at 12:25

## 2023-09-20 RX ADMIN — DIGOXIN 125 MCG: 125 TABLET ORAL at 12:25

## 2023-09-20 RX ADMIN — ACETAMINOPHEN 650 MG: 325 TABLET, FILM COATED ORAL at 18:13

## 2023-09-20 NOTE — PLAN OF CARE
Problem: Adult Inpatient Plan of Care  Goal: Plan of Care Review  Outcome: Ongoing, Progressing  Flowsheets (Taken 9/19/2023 2341)  Progress: no change  Plan of Care Reviewed With: patient  Goal: Patient-Specific Goal (Individualized)  Outcome: Ongoing, Progressing  Goal: Absence of Hospital-Acquired Illness or Injury  Outcome: Ongoing, Progressing  Intervention: Identify and Manage Fall Risk  Recent Flowsheet Documentation  Taken 9/19/2023 1945 by Manoj Guzman RN  Safety Promotion/Fall Prevention: safety round/check completed  Intervention: Prevent Skin Injury  Recent Flowsheet Documentation  Taken 9/19/2023 1945 by Manoj Guzman RN  Body Position: position changed independently  Intervention: Prevent and Manage VTE (Venous Thromboembolism) Risk  Recent Flowsheet Documentation  Taken 9/19/2023 1945 by Manoj Guzman RN  Activity Management: ambulated to bathroom  Range of Motion: active ROM (range of motion) encouraged  Intervention: Prevent Infection  Recent Flowsheet Documentation  Taken 9/19/2023 1945 by Manoj Guzman RN  Infection Prevention: hand hygiene promoted  Goal: Optimal Comfort and Wellbeing  Outcome: Ongoing, Progressing  Intervention: Monitor Pain and Promote Comfort  Recent Flowsheet Documentation  Taken 9/19/2023 1945 by Manoj Guzman RN  Pain Management Interventions: see MAR  Intervention: Provide Person-Centered Care  Recent Flowsheet Documentation  Taken 9/19/2023 1945 by Manoj Guzman RN  Trust Relationship/Rapport:   care explained   choices provided  Goal: Readiness for Transition of Care  Outcome: Ongoing, Progressing     Problem: Chest Pain  Goal: Resolution of Chest Pain Symptoms  Outcome: Ongoing, Progressing     Problem: Fall Injury Risk  Goal: Absence of Fall and Fall-Related Injury  Outcome: Ongoing, Progressing  Intervention: Identify and Manage Contributors  Recent Flowsheet Documentation  Taken 9/19/2023 1945 by Manoj Guzman RN  Medication Review/Management: medications  reviewed  Intervention: Promote Injury-Free Environment  Recent Flowsheet Documentation  Taken 9/19/2023 1945 by Manoj Guzman, RN  Safety Promotion/Fall Prevention: safety round/check completed   Goal Outcome Evaluation:  Plan of Care Reviewed With: patient        Progress: no change

## 2023-09-20 NOTE — DISCHARGE SUMMARY
[facility]    Rubio Dover MD    CHIEF COMPLAINT:     Palpitations and weakness    HISTORY OF PRESENT ILLNESS:    Ms. Moreno is a 83 y.o.  presents to [facility] complaining of palpitations         History of Present Illness     ED 9/18/23: Patient is an 83-year-old female presenting from home with reports of elevated heart rate and palpitations over the past 3 to 4 days. She reports associated nausea as well. Patient reports increased frequency with urination but denies any dysuria. She also reports she had a few episodes of diarrhea that has improved over the last day. Denies any significant abdominal pain. No chest pain recent cough or congestion. She reports chronic shortness of breath only with exertion and denies significant change. States she normally is on digoxin and Eliquis. She has not taken her morning medications.      Observation 9/19/23: Patient is a an 83-year-old female presenting to the hospital with complaints of palpitations and weakness.  Patient reports nausea and fatigue in the past week with loss of appetite.  Patient states she had no fever vomiting or abdominal pain but states that she had the flu.  Respiratory panel has been checked and is negative.  Patient was recently admitted for atrial fibrillation.  Patient states her son is a  and manages her medications and she had not had him  medications for atrial fibrillation.  Patient states she has restless legs and for weakness but no falls or injury     9/20/2023:  Patient reports she did generally well overnight though she does continue to experience some palpitations.  These are improving since restarting her digoxin.  Some mild nausea without vomiting is also been present.  She denies any fever, dyspnea, chest pain or changes in bowel or bladder habits.  Patient also notes that she has been unable to obtain her recently restarted digoxin due to her son who is a truck   being out of town and not being able to  her medicines.          Past Medical History:   Diagnosis Date    Acute kidney injury 07/22/2022    Anxiety     Arthritis     Atrial fibrillation     paroxysmal    Broken shoulder     left-- due to fall 11-7-19 was at [facility]    Chronic systolic CHF (congestive heart failure) 01/05/2023    EF 36-40%    Coronary artery disease involving native coronary artery of native heart without angina pectoris 04/02/2021    nonobstructive    DDD (degenerative disc disease), lumbar     Depression     Edema     9/2020 foot    GERD (gastroesophageal reflux disease)     H/O fall     Heart failure with mid-range ejection fraction 03/05/2022    EF 45% per 2D echo    Hypertension     Hypothyroidism     Insomnia     Low back pain     Moderate mitral regurgitation 1/5/2023    Neuropathy     Pain in both feet     PONV (postoperative nausea and vomiting)     Pulmonary hypertension     Radiculopathy     Restless legs     Spondylolisthesis     Stage 3a chronic kidney disease     Urinary incontinence      Past Surgical History:   Procedure Laterality Date    BACK SURGERY  07/19/2018    PDC &  PSF L3-L5 insitu    CARDIAC CATHETERIZATION N/A 4/2/2021    Procedure: Cardiac Catheterization/Vascular Study;  Surgeon: Barrett Evans MD;  Location: [facility] CATH INVASIVE LOCATION;  Service: Cardiovascular;  Laterality: N/A;    CARDIAC ELECTROPHYSIOLOGY PROCEDURE N/A 1/17/2023    Procedure: Pacemaker DC new;  Surgeon: Baljeet Valero MD;  Location: [facility] CATH INVASIVE LOCATION;  Service: Cardiovascular;  Laterality: N/A;    CHOLECYSTECTOMY      COLONOSCOPY      ENDOSCOPY N/A 9/14/2023    Procedure: ESOPHAGOGASTRODUODENOSCOPY;  Surgeon: Ulysses Lamas MD;  Location: [facility] ENDOSCOPY;  Service: Gastroenterology;  Laterality: N/A;  gastritis, inflammatory gastric polyp    HYSTERECTOMY      ROTATOR CUFF REPAIR Left      Family History   Problem Relation Age of Onset    Cancer  Father     Diabetes Son     Cancer Paternal Aunt     Heart disease Paternal Aunt     Cancer Paternal Uncle      Social History     Tobacco Use    Smoking status: Never    Smokeless tobacco: Never   Vaping Use    Vaping Use: Never used   Substance Use Topics    Alcohol use: No    Drug use: Never     Medications Prior to Admission   Medication Sig Dispense Refill Last Dose    acetaminophen (TYLENOL) 325 MG tablet Take 2 tablets by mouth Every 6 (Six) Hours As Needed for Mild Pain. Indications: Pain       apixaban (ELIQUIS) 5 MG tablet tablet Take 1 tablet by mouth Every 12 (Twelve) Hours. Indications: Atrial Fibrillation 60 tablet 2     atorvastatin (LIPITOR) 10 MG tablet Take 1 tablet by mouth Daily.       metoprolol succinate XL (TOPROL-XL) 25 MG 24 hr tablet Take 1 tablet by mouth Daily. 30 tablet 0     ondansetron (ZOFRAN) 4 MG tablet Take 1 tablet by mouth Every 8 (Eight) Hours As Needed for Nausea or Vomiting.       rOPINIRole (REQUIP) 0.25 MG tablet Take 1 tablet by mouth Every Night. Take 1 hour before bedtime.  Indications: Restless Leg Syndrome 30 tablet 0     sacubitril-valsartan (Entresto) 24-26 MG tablet Take 1 tablet by mouth 2 (Two) Times a Day. 60 tablet 0     traZODone (DESYREL) 50 MG tablet Take 0.5 tablets by mouth Daily As Needed (Takes at night time as needed). Indications: Trouble Sleeping       loperamide (IMODIUM A-D) 2 MG tablet Take 1 tablet by mouth 4 (Four) Times a Day As Needed for Diarrhea. Indications: Diarrhea        Allergies:  Baclofen, Codeine, Ibuprofen, Methocarbamol, Naproxen, Tizanidine hcl, and Tolmetin    Immunization History   Administered Date(s) Administered    Fluzone (or Fluarix & Flulaval for VFC) >6mos 03/10/2022           REVIEW OF SYSTEMS:    Review of Systems   Constitutional: Positive for malaise/fatigue.   HENT: Negative.     Eyes: Negative.    Cardiovascular:  Positive for palpitations.   Respiratory: Negative.     Endocrine: Negative.    Hematologic/Lymphatic:  Negative.    Skin: Negative.    Musculoskeletal:  Positive for muscle cramps.   Gastrointestinal: Negative.    Genitourinary: Negative.    Neurological:  Positive for light-headedness and weakness.   Psychiatric/Behavioral: Negative.     Allergic/Immunologic: Negative.      Vital Signs  Temp:  [97.4 °F (36.3 °C)-97.9 °F (36.6 °C)] 97.7 °F (36.5 °C)  Heart Rate:  [] 70  Resp:  [14-17] 14  BP: ()/(49-80) 97/56          Physical Exam:  Physical Exam  Vitals reviewed.   Constitutional:       General: She is not in acute distress.     Appearance: Normal appearance. She is normal weight. She is not ill-appearing, toxic-appearing or diaphoretic.   HENT:      Head: Normocephalic.      Right Ear: External ear normal.      Left Ear: External ear normal.      Nose: Nose normal.      Mouth/Throat:      Mouth: Mucous membranes are moist.   Eyes:      Extraocular Movements: Extraocular movements intact.   Cardiovascular:      Rate and Rhythm: Normal rate and regular rhythm.      Pulses: Normal pulses.   Pulmonary:      Effort: Pulmonary effort is normal.      Breath sounds: Normal breath sounds.   Abdominal:      General: Bowel sounds are normal.      Palpations: Abdomen is soft.   Musculoskeletal:         General: Normal range of motion.      Cervical back: Normal range of motion.      Right lower leg: No edema.      Left lower leg: No edema.   Skin:     General: Skin is warm and dry.      Capillary Refill: Capillary refill takes less than 2 seconds.   Neurological:      General: No focal deficit present.      Mental Status: She is alert.   Psychiatric:         Mood and Affect: Mood normal.         Behavior: Behavior normal.         Thought Content: Thought content normal.         Judgment: Judgment normal.         Emotional Behavior:   Normal   Debilities:  None  Results Review:    I reviewed the patient's new clinical results.  Lab Results (most recent)       Procedure Component Value Units Date/Time    Basic  Metabolic Panel [[id number]]  (Abnormal) Collected: 09/20/23 0407    Specimen: Blood Updated: 09/20/23 0508     Glucose 106 mg/dL      BUN 17 mg/dL      Creatinine 0.85 mg/dL      Sodium 141 mmol/L      Potassium 4.0 mmol/L      Chloride 108 mmol/L      CO2 27.0 mmol/L      Calcium 8.1 mg/dL      BUN/Creatinine Ratio 20.0     Anion Gap 6.0 mmol/L      eGFR 68.5 mL/min/1.73     Narrative:      GFR Normal >60  Chronic Kidney Disease <60  Kidney Failure <15    The GFR formula is only valid for adults with stable renal function between ages 18 and 70.    Digoxin Level [[id number]]  (Normal) Collected: 09/20/23 0407    Specimen: Blood Updated: 09/20/23 0508     Digoxin 0.70 ng/mL     CBC & Differential [[id number]]  (Abnormal) Collected: 09/20/23 0407    Specimen: Blood Updated: 09/20/23 0435    Narrative:      The following orders were created for panel order CBC & Differential.  Procedure                               Abnormality         Status                     ---------                               -----------         ------                     CBC Auto Differential[[id number]]        Abnormal            Final result                 Please view results for these tests on the individual orders.    CBC Auto Differential [[id number]]  (Abnormal) Collected: 09/20/23 0407    Specimen: Blood Updated: 09/20/23 0435     WBC 4.70 10*3/mm3      RBC 3.76 10*6/mm3      Hemoglobin 10.4 g/dL      Hematocrit 32.4 %      MCV 86.2 fL      MCH 27.8 pg      MCHC 32.2 g/dL      RDW 17.3 %      RDW-SD 52.9 fl      MPV 8.4 fL      Platelets 226 10*3/mm3      Neutrophil % 42.0 %      Lymphocyte % 42.1 %      Monocyte % 9.9 %      Eosinophil % 4.1 %      Basophil % 1.9 %      Neutrophils, Absolute 2.00 10*3/mm3      Lymphocytes, Absolute 2.00 10*3/mm3      Monocytes, Absolute 0.50 10*3/mm3      Eosinophils, Absolute 0.20 10*3/mm3      Basophils, Absolute 0.10 10*3/mm3      nRBC 0.1 /100 WBC     Basic Metabolic Panel [[id number]]  (Normal)  Collected: 09/19/23 0337    Specimen: Blood from Arm, Left Updated: 09/19/23 0432     Glucose 95 mg/dL      BUN 13 mg/dL      Creatinine 0.90 mg/dL      Sodium 140 mmol/L      Potassium 3.7 mmol/L      Chloride 106 mmol/L      CO2 25.0 mmol/L      Calcium 8.9 mg/dL      BUN/Creatinine Ratio 14.4     Anion Gap 9.0 mmol/L      eGFR 64.0 mL/min/1.73     Narrative:      GFR Normal >60  Chronic Kidney Disease <60  Kidney Failure <15    The GFR formula is only valid for adults with stable renal function between ages 18 and 70.    CBC & Differential [[id number]]  (Abnormal) Collected: 09/19/23 0337    Specimen: Blood from Arm, Left Updated: 09/19/23 0409    Narrative:      The following orders were created for panel order CBC & Differential.  Procedure                               Abnormality         Status                     ---------                               -----------         ------                     CBC Auto Differential[[id number]]        Abnormal            Final result                 Please view results for these tests on the individual orders.    CBC Auto Differential [[id number]]  (Abnormal) Collected: 09/19/23 0337    Specimen: Blood from Arm, Left Updated: 09/19/23 0409     WBC 5.40 10*3/mm3      RBC 4.09 10*6/mm3      Hemoglobin 11.3 g/dL      Hematocrit 35.1 %      MCV 85.8 fL      MCH 27.7 pg      MCHC 32.3 g/dL      RDW 16.8 %      RDW-SD 50.3 fl      MPV 8.5 fL      Platelets 241 10*3/mm3      Neutrophil % 44.3 %      Lymphocyte % 43.0 %      Monocyte % 8.9 %      Eosinophil % 2.5 %      Basophil % 1.3 %      Neutrophils, Absolute 2.40 10*3/mm3      Lymphocytes, Absolute 2.30 10*3/mm3      Monocytes, Absolute 0.50 10*3/mm3      Eosinophils, Absolute 0.10 10*3/mm3      Basophils, Absolute 0.10 10*3/mm3      nRBC 0.1 /100 WBC     BNP [[id number]]  (Normal) Collected: 09/18/23 1541    Specimen: Blood Updated: 09/18/23 1733     proBNP 1,318.0 pg/mL     Narrative:      Among patients with dyspnea,  NT-proBNP is highly sensitive for the detection of acute congestive heart failure. In addition NT-proBNP of <300 pg/ml effectively rules out acute congestive heart failure with 99% negative predictive value.      TSH [[id number]]  (Normal) Collected: 09/18/23 1541    Specimen: Blood Updated: 09/18/23 1733     TSH 2.170 uIU/mL     T4, Free [[id number]]  (Normal) Collected: 09/18/23 1541    Specimen: Blood Updated: 09/18/23 1733     Free T4 1.05 ng/dL     Narrative:      Results may be falsely increased if patient taking Biotin.      Lipid Panel [[id number]] Collected: 09/18/23 1541    Specimen: Blood Updated: 09/18/23 1726     Total Cholesterol 130 mg/dL      Triglycerides 71 mg/dL      HDL Cholesterol 59 mg/dL      LDL Cholesterol  57 mg/dL      VLDL Cholesterol 14 mg/dL      LDL/HDL Ratio 0.96    Narrative:      Cholesterol Reference Ranges  (U.S. Department of Health and Human Services ATP III Classifications)    Desirable          <200 mg/dL  Borderline High    200-239 mg/dL  High Risk          >240 mg/dL      Triglyceride Reference Ranges  (U.S. Department of Health and Human Services ATP III Classifications)    Normal           <150 mg/dL  Borderline High  150-199 mg/dL  High             200-499 mg/dL  Very High        >500 mg/dL    HDL Reference Ranges  (U.S. Department of Health and Human Services ATP III Classifications)    Low     <40 mg/dl (major risk factor for CHD)  High    >60 mg/dl ('negative' risk factor for CHD)        LDL Reference Ranges  (U.S. Department of Health and Human Services ATP III Classifications)    Optimal          <100 mg/dL  Near Optimal     100-129 mg/dL  Borderline High  130-159 mg/dL  High             160-189 mg/dL  Very High        >189 mg/dL    Magnesium [[id number]]  (Normal) Collected: 09/18/23 1541    Specimen: Blood Updated: 09/18/23 1726     Magnesium 1.8 mg/dL     Hemoglobin A1c [[id number]]  (Abnormal) Collected: 09/18/23 1344    Specimen: Blood Updated: 09/18/23 1713      Hemoglobin A1C 5.80 %     High Sensitivity Troponin T 2Hr [[id number]]  (Abnormal) Collected: 09/18/23 1541    Specimen: Blood Updated: 09/18/23 1604     HS Troponin T 10 ng/L      Troponin T Delta 1 ng/L     Narrative:      High Sensitive Troponin T Reference Range:  <10.0 ng/L- Negative Female for AMI  <15.0 ng/L- Negative Male for AMI  >=10 - Abnormal Female indicating possible myocardial injury.  >=15 - Abnormal Male indicating possible myocardial injury.   Clinicians would have to utilize clinical acumen, EKG, Troponin, and serial changes to determine if it is an Acute Myocardial Infarction or myocardial injury due to an underlying chronic condition.         Urinalysis With Culture If Indicated - Urine, Clean Catch [[id number]]  (Abnormal) Collected: 09/18/23 1518    Specimen: Urine, Clean Catch Updated: 09/18/23 1527     Color, UA Yellow     Appearance, UA Clear     pH, UA 7.0     Specific Gravity, UA 1.014     Glucose, UA Negative     Ketones, UA 15 mg/dL (1+)     Bilirubin, UA Negative     Blood, UA Negative     Protein, UA Negative     Leuk Esterase, UA Negative     Nitrite, UA Negative     Urobilinogen, UA 1.0 E.U./dL    Narrative:      In absence of clinical symptoms, the presence of pyuria, bacteria, and/or nitrites on the urinalysis result does not correlate with infection.  Urine microscopic not indicated.    Respiratory Panel PCR w/COVID-19(SARS-CoV-2) WENDI/MIKAYLA/KEVEN/PAD/COR/MAD/DWIGHT In-House, NP Swab in UTM/VTM, 3-4 HR TAT - Swab, Nasopharynx [[id number]]  (Normal) Collected: 09/18/23 1414    Specimen: Swab from Nasopharynx Updated: 09/18/23 1505     ADENOVIRUS, PCR Not Detected     Coronavirus 229E Not Detected     Coronavirus HKU1 Not Detected     Coronavirus NL63 Not Detected     Coronavirus OC43 Not Detected     COVID19 Not Detected     Human Metapneumovirus Not Detected     Human Rhinovirus/Enterovirus Not Detected     Influenza A PCR Not Detected     Influenza B PCR Not Detected     Parainfluenza  Virus 1 Not Detected     Parainfluenza Virus 2 Not Detected     Parainfluenza Virus 3 Not Detected     Parainfluenza Virus 4 Not Detected     RSV, PCR Not Detected     Bordetella pertussis pcr Not Detected     Bordetella parapertussis PCR Not Detected     Chlamydophila pneumoniae PCR Not Detected     Mycoplasma pneumo by PCR Not Detected    Narrative:      In the setting of a positive respiratory panel with a viral infection PLUS a negative procalcitonin without other underlying concern for bacterial infection, consider observing off antibiotics or discontinuation of antibiotics and continue supportive care. If the respiratory panel is positive for atypical bacterial infection (Bordetella pertussis, Chlamydophila pneumoniae, or Mycoplasma pneumoniae), consider antibiotic de-escalation to target atypical bacterial infection.    Extra Tubes [[id number]] Collected: 09/18/23 1344    Specimen: Blood Updated: 09/18/23 1445    Narrative:      The following orders were created for panel order Extra Tubes.  Procedure                               Abnormality         Status                     ---------                               -----------         ------                     Lavender Top[[id number]]                                     Final result               Gold Top - SST[[id number]]                                   Final result               Light Blue Top[[id number]]                                   Final result                 Please view results for these tests on the individual orders.    Lavender Top [[id number]] Collected: 09/18/23 1344    Specimen: Blood Updated: 09/18/23 1445     Extra Tube hold for add-on     Comment: Auto resulted       Gold Top - SST [[id number]] Collected: 09/18/23 1344    Specimen: Blood Updated: 09/18/23 1445     Extra Tube Hold for add-ons.     Comment: Auto resulted.       Light Blue Top [[id number]] Collected: 09/18/23 1344    Specimen: Blood Updated: 09/18/23 1445     Extra Tube Hold for  add-ons.     Comment: Auto resulted       Comprehensive Metabolic Panel [[id number]]  (Abnormal) Collected: 09/18/23 1344    Specimen: Blood Updated: 09/18/23 1444     Glucose 101 mg/dL      BUN 13 mg/dL      Creatinine 0.78 mg/dL      Sodium 141 mmol/L      Potassium 4.0 mmol/L      Chloride 104 mmol/L      CO2 25.0 mmol/L      Calcium 9.3 mg/dL      Total Protein 7.2 g/dL      Albumin 4.3 g/dL      ALT (SGPT) 13 U/L      AST (SGOT) 19 U/L      Alkaline Phosphatase 119 U/L      Total Bilirubin 1.2 mg/dL      Globulin 2.9 gm/dL      A/G Ratio 1.5 g/dL      BUN/Creatinine Ratio 16.7     Anion Gap 12.0 mmol/L      eGFR 75.9 mL/min/1.73     Narrative:      GFR Normal >60  Chronic Kidney Disease <60  Kidney Failure <15    The GFR formula is only valid for adults with stable renal function between ages 18 and 70.    High Sensitivity Troponin T [[id number]]  (Normal) Collected: 09/18/23 1344    Specimen: Blood Updated: 09/18/23 1444     HS Troponin T 9 ng/L     Narrative:      High Sensitive Troponin T Reference Range:  <10.0 ng/L- Negative Female for AMI  <15.0 ng/L- Negative Male for AMI  >=10 - Abnormal Female indicating possible myocardial injury.  >=15 - Abnormal Male indicating possible myocardial injury.   Clinicians would have to utilize clinical acumen, EKG, Troponin, and serial changes to determine if it is an Acute Myocardial Infarction or myocardial injury due to an underlying chronic condition.         Protime-INR [[id number]]  (Normal) Collected: 09/18/23 1344    Specimen: Blood Updated: 09/18/23 1424     Protime 10.7 Seconds      INR 1.00    aPTT [[id number]]  (Normal) Collected: 09/18/23 1344    Specimen: Blood Updated: 09/18/23 1424     PTT 29.9 seconds     Digoxin Level [[id number]]  (Normal) Collected: 09/18/23 1344    Specimen: Blood Updated: 09/18/23 1415     Digoxin 0.60 ng/mL             Imaging Results (Most Recent)       Procedure Component Value Units Date/Time    XR Chest 1 View [[id number]]  Collected: 09/18/23 1415     Updated: 09/18/23 1418    Narrative:      XR CHEST 1 VW    Date of Exam: 9/18/2023 1:10 PM CDT    Indication: Chest Pain Protocol  Chest Pain Protocol    Comparison: 9/12/2023    Findings:  Cardiomediastinal silhouette is unremarkable.  No airspace disease, pneumothorax, nor pleural effusion.No acute osseous abnormality identified.      Impression:      Impression:  No acute process identified      Electronically Signed: Kirill Catalan MD    9/18/2023 1:16 PM CDT    Workstation ID: [id number]          reviewed    ECG/EMG Results (most recent)       Procedure Component Value Units Date/Time    SCANNED - TELEMETRY   [[id number]] Resulted: 09/18/23     Updated: 09/19/23 0658    SCANNED - TELEMETRY   [[id number]] Resulted: 09/18/23     Updated: 09/19/23 0658    SCANNED - TELEMETRY   [[id number]] Resulted: 09/18/23     Updated: 09/19/23 0731    ECG 12 Lead Tachycardia [[id number]] Collected: 09/18/23 1317     Updated: 09/19/23 0919     QT Interval 341 ms      QTC Interval 473 ms     Narrative:      HEART RATE= 115  bpm  RR Interval= 519  ms  MT Interval=   ms  P Horizontal Axis=   deg  P Front Axis=   deg  QRSD Interval= 70  ms  QT Interval= 341  ms  QTcB= 473  ms  QRS Axis= 66  deg  T Wave Axis= -76  deg  - ABNORMAL ECG -  Atrial fibrillation  When compared with ECG of 12-Sep-2023 14:31:11,  Significant repolarization change  Electronically Signed By: Pa Stewart ([facility]) 19-Sep-2023 09:19:03  Date and Time of Study: 2023-09-18 13:17:21    SCANNED - TELEMETRY   [[id number]] Resulted: 09/18/23     Updated: 09/20/23 0647    SCANNED - TELEMETRY   [[id number]] Resulted: 09/18/23     Updated: 09/20/23 0652    SCANNED - TELEMETRY   [[id number]] Resulted: 09/18/23     Updated: 09/20/23 0901    SCANNED - TELEMETRY   [[id number]] Resulted: 09/18/23     Updated: 09/20/23 0901    SCANNED - TELEMETRY   [[id number]] Resulted: 09/18/23     Updated: 09/20/23 0901    SCANNED - TELEMETRY   [[id number]] Resulted: 09/18/23      Updated: 09/20/23 0903          reviewed    Results for orders placed during the hospital encounter of 03/04/22    Duplex Venous Lower Extremity - Bilateral CAR    Interpretation Summary  · Normal bilateral lower extremity venous duplex scan.  · Nonvascular cystic mass located in the left posterior mid calf.      Results for orders placed during the hospital encounter of 09/05/23    Adult Transthoracic Echo Complete W/ Cont if Necessary Per Protocol    Interpretation Summary    Left ventricular ejection fraction appears to be 51 - 55%.    The right ventricular cavity is moderately dilated.    The left atrial cavity is moderately dilated.    The right atrial cavity is mildly  dilated.    There is calcification of the aortic valve.    Moderate aortic valve regurgitation is present.    Estimated right ventricular systolic pressure from tricuspid regurgitation is mildly elevated (35-45 mmHg).      Microbiology Results (last 10 days)       Procedure Component Value - Date/Time    Respiratory Panel PCR w/COVID-19(SARS-CoV-2) WENDI/MIKAYLA/KEVEN/PAD/COR/MAD/DWIGHT In-House, NP Swab in UTM/VTM, 3-4 HR TAT - Swab, Nasopharynx [[id number]]  (Normal) Collected: 09/18/23 1414    Lab Status: Final result Specimen: Swab from Nasopharynx Updated: 09/18/23 1505     ADENOVIRUS, PCR Not Detected     Coronavirus 229E Not Detected     Coronavirus HKU1 Not Detected     Coronavirus NL63 Not Detected     Coronavirus OC43 Not Detected     COVID19 Not Detected     Human Metapneumovirus Not Detected     Human Rhinovirus/Enterovirus Not Detected     Influenza A PCR Not Detected     Influenza B PCR Not Detected     Parainfluenza Virus 1 Not Detected     Parainfluenza Virus 2 Not Detected     Parainfluenza Virus 3 Not Detected     Parainfluenza Virus 4 Not Detected     RSV, PCR Not Detected     Bordetella pertussis pcr Not Detected     Bordetella parapertussis PCR Not Detected     Chlamydophila pneumoniae PCR Not Detected     Mycoplasma pneumo by PCR  Not Detected    Narrative:      In the setting of a positive respiratory panel with a viral infection PLUS a negative procalcitonin without other underlying concern for bacterial infection, consider observing off antibiotics or discontinuation of antibiotics and continue supportive care. If the respiratory panel is positive for atypical bacterial infection (Bordetella pertussis, Chlamydophila pneumoniae, or Mycoplasma pneumoniae), consider antibiotic de-escalation to target atypical bacterial infection.    Respiratory Panel PCR w/COVID-19(SARS-CoV-2) [facility]AYLA/[facility]/COR/MAD/DWIGHT In-House, NP Swab in UTM/VTM, 3-4 HR TAT - Swab, Nasopharynx [[id number]]  (Normal) Collected: 09/12/23 1436    Lab Status: Final result Specimen: Swab from Nasopharynx Updated: 09/12/23 1529     ADENOVIRUS, PCR Not Detected     Coronavirus 229E Not Detected     Coronavirus HKU1 Not Detected     Coronavirus NL63 Not Detected     Coronavirus OC43 Not Detected     COVID19 Not Detected     Human Metapneumovirus Not Detected     Human Rhinovirus/Enterovirus Not Detected     Influenza A PCR Not Detected     Influenza B PCR Not Detected     Parainfluenza Virus 1 Not Detected     Parainfluenza Virus 2 Not Detected     Parainfluenza Virus 3 Not Detected     Parainfluenza Virus 4 Not Detected     RSV, PCR Not Detected     Bordetella pertussis pcr Not Detected     Bordetella parapertussis PCR Not Detected     Chlamydophila pneumoniae PCR Not Detected     Mycoplasma pneumo by PCR Not Detected    Narrative:      In the setting of a positive respiratory panel with a viral infection PLUS a negative procalcitonin without other underlying concern for bacterial infection, consider observing off antibiotics or discontinuation of antibiotics and continue supportive care. If the respiratory panel is positive for atypical bacterial infection (Bordetella pertussis, Chlamydophila pneumoniae, or Mycoplasma pneumoniae), consider antibiotic de-escalation to target  atypical bacterial infection.            Assessment & Plan     Palpitations     Atrial fibrillation   - Hx of Afib, pacemaker  - EKG Reviewed, showed atrial fib with heart rate 117  - ECHO EF 51-55, moderate aortic valve regurgitation and moderate dilated RV (09/07/2023)  - Continue metoprolol, Entresto, Eliquis  - Resume digoxin  - Continue IV fluids from ED  - Respiratory viral panel negative  - Digoxin level 0.60  - Continuous cardiac monitoring    Anemia  Lab Results   Component Value Date    HGB 10.4 (L) 09/20/2023    MCV 86.2 09/20/2023    MCHC 32.2 09/20/2023   -Monitor while admitted    Hypertension  -Patient has had borderline blood pressures throughout her admission  BP Readings from Last 1 Encounters:   09/20/23 97/56   -Entresto discontinued  - Continue metoprolol for now  - Monitor while admitted       Anxiety/Restless leg syndrome  - resume gabapentin and requip      GERD  - resume PPI, Reglan, and Carafate    I discussed the patients findings and my recommendations with patient and nursing staff.     Discharge Diagnosis:      Palpitations      Hospital Course  Patient is a 83 y.o. female presented with weakness and palpitations with a history of atrial fibrillation and in HPI noted above.  Patient was recently seen at this facility and started on digoxin therapy which she was unable to obtain following discharge.  Her troponins were assessed and found to be 9, 10 with a proBNP of 1318.0.  BMP has been generally unremarkable and hemoglobin has trended down somewhat from 12.4-10.4 during her admission with baseline being generally 11.0.  PT was consulted and evaluated patient recommending home health PT at discharge.  EKG showed A-fib at 117 with heart rates generally improving during her admission.  Recent echocardiogram showed an improved EF to 51-55% with moderate aortic valve regurgitation and moderately dilated right ventricle.  Blood pressures have trended to the 90s over 50s during her admission  and Entresto has subsequently been discontinued, metoprolol was continued due to her previously noted A-fib.  Digoxin has been resumed and case management was able to arrange this prescription be delivered through meds to beds program prior to patient's discharge.  At this time she is felt to be in good condition for discharge with close follow-up with her PCP as well as cardiology within 1 week.  Her full testing/results and plan were discussed with patient along with concerning/alarm symptoms for which to call 911/return to the ED.  She is also instructed to monitor and log heart rates and blood pressures to discuss with PCP and cardiology next visit.  All questions were answered and she verbalizes understanding and agreement.    Past Medical History:     Past Medical History:   Diagnosis Date    Acute kidney injury 07/22/2022    Anxiety     Arthritis     Atrial fibrillation     paroxysmal    Broken shoulder     left-- due to fall 11-7-19 was at [facility]    Chronic systolic CHF (congestive heart failure) 01/05/2023    EF 36-40%    Coronary artery disease involving native coronary artery of native heart without angina pectoris 04/02/2021    nonobstructive    DDD (degenerative disc disease), lumbar     Depression     Edema     9/2020 foot    GERD (gastroesophageal reflux disease)     H/O fall     Heart failure with mid-range ejection fraction 03/05/2022    EF 45% per 2D echo    Hypertension     Hypothyroidism     Insomnia     Low back pain     Moderate mitral regurgitation 1/5/2023    Neuropathy     Pain in both feet     PONV (postoperative nausea and vomiting)     Pulmonary hypertension     Radiculopathy     Restless legs     Spondylolisthesis     Stage 3a chronic kidney disease     Urinary incontinence        Past Surgical History:     Past Surgical History:   Procedure Laterality Date    BACK SURGERY  07/19/2018    PDC &  PSF L3-L5 insitu    CARDIAC CATHETERIZATION N/A 4/2/2021    Procedure: Cardiac  Catheterization/Vascular Study;  Surgeon: Barrett Evans MD;  Location: [facility] CATH INVASIVE LOCATION;  Service: Cardiovascular;  Laterality: N/A;    CARDIAC ELECTROPHYSIOLOGY PROCEDURE N/A 1/17/2023    Procedure: Pacemaker DC new;  Surgeon: Baljeet Valero MD;  Location: [facility] CATH INVASIVE LOCATION;  Service: Cardiovascular;  Laterality: N/A;    CHOLECYSTECTOMY      COLONOSCOPY      ENDOSCOPY N/A 9/14/2023    Procedure: ESOPHAGOGASTRODUODENOSCOPY;  Surgeon: Ulysses Lamas MD;  Location: [facility] ENDOSCOPY;  Service: Gastroenterology;  Laterality: N/A;  gastritis, inflammatory gastric polyp    HYSTERECTOMY      ROTATOR CUFF REPAIR Left        Social History:   Social History     Socioeconomic History    Marital status:    Tobacco Use    Smoking status: Never    Smokeless tobacco: Never   Vaping Use    Vaping Use: Never used   Substance and Sexual Activity    Alcohol use: No    Drug use: Never    Sexual activity: Defer       Procedures Performed         Consults:   Consults       Date and Time Order Name Status Description    9/14/2023  9:02 AM Inpatient Gastroenterology Consult Completed     9/6/2023  1:04 PM Inpatient Gastroenterology Consult Completed             Condition on Discharge:     Stable    Discharge Disposition      Discharge Medications     Discharge Medications        ASK your doctor about these medications        Instructions Start Date   acetaminophen 325 MG tablet  Commonly known as: TYLENOL   2 tablets, Oral, Every 6 Hours PRN      apixaban 5 MG tablet tablet  Commonly known as: ELIQUIS   5 mg, Oral, Every 12 Hours Scheduled      atorvastatin 10 MG tablet  Commonly known as: LIPITOR   10 mg, Oral, Daily      Entresto 24-26 MG tablet  Generic drug: sacubitril-valsartan   1 tablet, Oral, 2 Times Daily      loperamide 2 MG tablet  Commonly known as: IMODIUM A-D   2 mg, Oral, 4 Times Daily PRN      metoprolol succinate XL 25 MG 24 hr tablet  Commonly known as:  TOPROL-XL   25 mg, Oral, Every 24 Hours      ondansetron 4 MG tablet  Commonly known as: ZOFRAN  Ask about: Which instructions should I use?   4 mg, Oral, Every 8 Hours PRN      rOPINIRole 0.25 MG tablet  Commonly known as: REQUIP   0.25 mg, Oral, Nightly, Take 1 hour before bedtime.      traZODone 50 MG tablet  Commonly known as: DESYREL   0.5 tablets, Oral, Daily PRN               Discharge Diet:     Activity at Discharge:     Follow-up Appointments  Future Appointments   Date Time Provider Department Center   9/21/2023 To Be Determined Xavi Gusman, [facility]  [facility]   9/21/2023 To Be Determined Marti Corado RN  [facility]   9/26/2023 To Be Determined Xavi Gusman, [facility]   9/28/2023 To Be Determined Marti Corado RN  [facility]   10/3/2023 To Be Determined Edy Dorman, PT  [facility]   10/5/2023 To Be Determined Marti Corado RN  [facility]   10/10/2023 To Be Determined Xavi Gusman, [facility]   10/12/2023 To Be Determined Marti Corado RN  [facility]   10/17/2023 To Be Determined Xavi Gusman, [facility]   10/19/2023 To Be Determined Marit Corado RN [facility]   10/24/2023 To Be Determined Edy Dorman, PT  [facility]   10/26/2023 To Be Determined Marti Corado RN  [facility]   11/2/2023 To Be Determined Marti Corado RN  [facility]   11/7/2023 To Be Determined Marti Corado RN [facility]         Test Results Pending at Discharge       Risk for Readmission (LACE) Score: 10 (9/20/2023  6:00 AM)      Greater than 30 minutes spent in discharge activities for this patient    Wagner Pan PA-C  09/20/23  13:36 EDT

## 2023-09-20 NOTE — PLAN OF CARE
Goal Outcome Evaluation:              Outcome Evaluation: Discharging home.  VSS.  Appropriate for discharge.

## 2023-09-20 NOTE — CASE MANAGEMENT/SOCIAL WORK
Continued Stay Note  [facility]     Patient Name: Catalina Moreno  MRN: [id number]  Today's Date: 9/20/2023    Admit Date: 9/18/2023    Plan: Return home with [facility].   Discharge Plan       Row Name 09/20/23 1356       Plan    Plan Return home with [facility].    Plan Comments CM met with patient at the bedside. PT recommendations for home with home health. Patient is current with [facility]. Patient does not have reliable transportation or a way for her to get groceries. Patient gave verbal consent for CM to place a referral for [facility] resources. PA asked CM to run a price check for digoxin due to patient stating she could not afford the medication and she never got it filled after her last hospital visit. CM called [facility] retail pharm and received a price check of $4.80. CM called patients preferred pharmacy of [facility] in [place] to inform them that [facility] retail pharmacy will fill the Digoxin script for patient. Patient erolled in meds to bed and patient received Digoxin script. CM set up [facility] transport for patient to discharge.                Ferny Grace RN      Office Phone: [phone]

## 2023-09-21 NOTE — CASE MANAGEMENT/SOCIAL WORK
Case Management Discharge Note      Final Note: Home with [facility]    Selected Continued Care - Discharged on 9/20/2023 Admission date: 9/18/2023 - Discharge disposition: Home or Self Care        Home Medical Care Coordination complete.      Service Provider Selected Services Address Phone Fax Patient Preferred    [facility] Home [facility] Services ,  Home Nursing ,  Home Rehabilitation [address] [facility] [place] [address] [phone] --           Transportation Services  W/C Van: Juan Erickson    Final Discharge Disposition Code: 06 - home with home health care

## 2023-09-21 NOTE — OUTREACH NOTE
Prep Survey      Flowsheet Row Responses   [facility] facility patient discharged from? [facility]   Is LACE score < 7 ? No   Eligibility Readm Mgmt   Discharge diagnosis Palpitations   Does the patient have one of the following disease processes/diagnoses(primary or secondary)? Other   Does the patient have Home health ordered? Yes   What is the Home health agency?  [facility]   Is there a DME ordered? No   Medication alerts for this patient See AVS   Prep survey completed? Yes            Sabrina ASHLEY - Registered Nurse

## 2023-09-22 ENCOUNTER — HOME CARE VISIT (OUTPATIENT)
Dept: HOME HEALTH SERVICES | Facility: HOME HEALTHCARE | Age: 83
End: 2023-09-22
Payer: MEDICARE

## 2023-09-22 VITALS
OXYGEN SATURATION: 98 % | DIASTOLIC BLOOD PRESSURE: 72 MMHG | HEART RATE: 84 BPM | TEMPERATURE: 98.2 F | SYSTOLIC BLOOD PRESSURE: 164 MMHG | RESPIRATION RATE: 18 BRPM

## 2023-09-22 PROCEDURE — G0299 HHS/HOSPICE OF RN EA 15 MIN: HCPCS

## 2023-09-22 NOTE — HOME HEALTH
Routine Visit Note: Medications reviewed. Patient has compalints of nausea; SN gave patient a zofran and educated patient on calling [facility] if nausea gets worse. CP assess. Appetite poor. No issues with elimination.     Skill/education provided: Vital signs stable. No wounds present. Cardiopulmonary assessment completed. Education provided about medications and nausea. Patient denies falls and pain.     Patient/caregiver response: Patient verbilized understanding.     Plan for next visit: Vital signs, assess nausea, assess falls and pain, cardiopulmonary assessment, reviewed medications, cp assess    Other pertinent info: na

## 2023-09-26 ENCOUNTER — HOME CARE VISIT (OUTPATIENT)
Dept: HOME HEALTH SERVICES | Facility: HOME HEALTHCARE | Age: 83
End: 2023-09-26
Payer: MEDICARE

## 2023-09-26 ENCOUNTER — READMISSION MANAGEMENT (OUTPATIENT)
Dept: CALL CENTER | Facility: HOSPITAL | Age: 83
End: 2023-09-26
Payer: MEDICARE

## 2023-09-26 PROCEDURE — G0157 HHC PT ASSISTANT EA 15: HCPCS

## 2023-09-26 NOTE — OUTREACH NOTE
Medical Week 1 Survey      Flowsheet Row Responses   [facility] patient discharged from? [facility]   Does the patient have one of the following disease processes/diagnoses(primary or secondary)? Other   Week 1 attempt successful? Yes   Call start time 1349   Call end time 1402   General alerts for this patient HX CHF   Discharge diagnosis Palpitations   Person spoke with today (if not patient) and relationship patient   Meds reviewed with patient/caregiver? Yes   Is the patient having any side effects they believe may be caused by any medication additions or changes? No   Does the patient have all medications ordered at discharge? Yes   Is the patient taking all medications as directed (includes completed medication regime)? Yes   Does the patient have a primary care provider?  Yes   Does the patient have an appointment with their PCP within 7 days of discharge? No   Comments regarding PCP Pt states PCP transferred.  She will see NP in office.   What is preventing the patient from scheduling follow up appointments within 7 days of discharge? Haven't had time   Has the patient kept scheduled appointments due by today? N/A   What is the Home health agency?  [facility]   Has home health visited the patient within 72 hours of discharge? Yes   Home health comments [facility] visiting.   Did the patient receive a copy of their discharge instructions? Yes   Nursing interventions Reviewed instructions with patient   What is the patient's perception of their health status since discharge? Improving   Is the patient/caregiver able to teach back the hierarchy of who to call/visit for symptoms/problems? PCP, Specialist, Home health nurse, Urgent Care, ED, 911 Yes   If the patient is a current smoker, are they able to teach back resources for cessation? Not a smoker   Additional teach back comments Pt doesnt have an appetite.  She is drinking water.   Week 1 call completed? Yes   Is the patient interested in additional calls  from an ambulatory ? No   Wrap up additional comments Pt doing ok at this time.  She has reduced appetite.  We discussed increasing intake to improve energy levels.  She needs a new PCP, provided number to faciliate.  She was receptive.  She is slightly frustrated with HH due to very short initial visit.  Encouraged patient to allow return as the first visit is more of an admission process and skilled nsg will occur at next visit.  Pt denies needs/concerns at end of call.   Call end time 1402            AMIE YANG - Registered Nurse

## 2023-09-27 VITALS
HEART RATE: 84 BPM | SYSTOLIC BLOOD PRESSURE: 128 MMHG | OXYGEN SATURATION: 99 % | TEMPERATURE: 97.4 F | DIASTOLIC BLOOD PRESSURE: 82 MMHG

## 2023-09-27 NOTE — HOME HEALTH
Routine Visit Note:   Patient was in the living room and said come in.   Patient rated her pain as 8/10.    Skill/education provided: Patient was instructed in therapeutic exercises, safe transfers and ambulation with an appropriate assist device.    Patient/caregiver response: Patient/caregiver understood instructions and performed exercises well.    Plan for next visit:  Advance gait and strengthening exercise as patient pain and tolerance allows.

## 2023-09-28 ENCOUNTER — HOME CARE VISIT (OUTPATIENT)
Dept: HOME HEALTH SERVICES | Facility: HOME HEALTHCARE | Age: 83
End: 2023-09-28
Payer: MEDICARE

## 2023-09-28 VITALS
TEMPERATURE: 97.7 F | SYSTOLIC BLOOD PRESSURE: 120 MMHG | DIASTOLIC BLOOD PRESSURE: 66 MMHG | RESPIRATION RATE: 18 BRPM | HEART RATE: 124 BPM | OXYGEN SATURATION: 98 %

## 2023-09-28 PROCEDURE — G0299 HHS/HOSPICE OF RN EA 15 MIN: HCPCS

## 2023-09-28 NOTE — HOME HEALTH
Routine Visit Note: Medications reviewed. Patient denies falls, pain and nausea. Patient stated she is slowly be able to eat more. CP assessed. No issues with elimination.     Skill/education provided: Vital signs stable. HR elevated; patient was just up and moving around. No wounds present. Cardiopulmonary assessment completed. Education provided about HR and safety in the home.     Patient/caregiver response: Patient verbilized understanding.     Plan for next visit: Vital signs, cardiopulmonary assessment, cp assess, review medications, assess falls and pain    Other pertinent info: na

## 2023-09-29 ENCOUNTER — HOME CARE VISIT (OUTPATIENT)
Dept: HOME HEALTH SERVICES | Facility: HOME HEALTHCARE | Age: 83
End: 2023-09-29
Payer: MEDICARE

## 2023-09-29 PROCEDURE — G0152 HHCP-SERV OF OT,EA 15 MIN: HCPCS

## 2023-10-01 VITALS
SYSTOLIC BLOOD PRESSURE: 124 MMHG | DIASTOLIC BLOOD PRESSURE: 70 MMHG | OXYGEN SATURATION: 98 % | TEMPERATURE: 97.7 F | HEART RATE: 100 BPM

## 2023-10-03 ENCOUNTER — HOME CARE VISIT (OUTPATIENT)
Dept: HOME HEALTH SERVICES | Facility: HOME HEALTHCARE | Age: 83
End: 2023-10-03
Payer: MEDICARE

## 2023-10-03 PROCEDURE — G0151 HHCP-SERV OF PT,EA 15 MIN: HCPCS

## 2023-10-04 VITALS
DIASTOLIC BLOOD PRESSURE: 78 MMHG | HEART RATE: 98 BPM | RESPIRATION RATE: 18 BRPM | OXYGEN SATURATION: 99 % | SYSTOLIC BLOOD PRESSURE: 124 MMHG | TEMPERATURE: 97.3 F

## 2023-10-04 NOTE — HOME HEALTH
--- START 30 day Assessment ---  Clinical condition of patient at initial or last assessment:  Last PT assessment (9/18/2023) revealed the problems of general lower extremity weakness (bilateral lower extremity strength graded 3/5) impaired transfers (required stand-by to minimal assistance), very limited ambulation ability/tolerance (only took a few steps with rollator walker).    Current clinical condition of patient:  PT Assessment this day (10/3/2023) reveals the problems of general weakness (bilateral lower extremity strength graded 3+ to 4-/5 throughout), impaired transfers (requires stand-by assistance for safety), limited ambulation (80 feet without device requiring stand-by assistance for safety).    Overall progress towards measurable treatment goals:  - Patient will demonstrate at least 4/5 gross strength in ble and comply with illustrated home exercise program between PT visits by 10/27/23.  Initial: Bilateral lower extremity strength graded 3/5).  Current: Bilateral lower extremity strength graded 3+ to 4-/5 throughout with weakness noted to negatively impact transfers, balance and gait.    - Patient will regain ability to perform sit to stand from all surfaces without multiple attempts by 10/13/23.  Initial: Requires multiple attempts and stand-by to contact guard assistance/minimal assistance with transfers.  Current: The patient requires from 1-2 attempts to move to standing and stand-by assistance for safety.    - Patient will regain ability to ambulate more than 300ft safely and independent outdoor with rollator walker by 10/27/23.  Initial: The patient demonstrated the ability to only take a few steps with rollator walker.  Current: The patient is able to ambulate 80 feet without device requiring stand-by assistance.    Effectiveness of current plan:  Progress towards goals as detailed this note demonstrate effectiveness of PT intervention to date.    Plan for continuing or discontinuing  service:  Plan is to continue PT per established plan of care (1WK3 remaining).    Changes to goals or care plan:  No need for changes to goal or care plan at this point in rehab.    Statement of expectation of continued progress toward goals:  Patient expected to make continued gains towards established goals.    Necessity of continue physical therapy:  Continued PT necessary to improve mobility and independence in the home.  --- END 30 day Assessment ---    Interventions: PT assessment, therapeutic exercise, gait training, transfer training, patient instruction (home safety and home exercise program (HEP) instruction).    Patient Response: Patient participates very well in session. Denies worsening of pain with exercise or gait.    Session Notes: Patient denies falls or other new problems.    Plan for next visit: Continue exercises and gait per patient tolerance.

## 2023-10-05 ENCOUNTER — HOME CARE VISIT (OUTPATIENT)
Dept: HOME HEALTH SERVICES | Facility: HOME HEALTHCARE | Age: 83
End: 2023-10-05
Payer: MEDICARE

## 2023-10-05 VITALS
SYSTOLIC BLOOD PRESSURE: 130 MMHG | OXYGEN SATURATION: 100 % | DIASTOLIC BLOOD PRESSURE: 70 MMHG | HEART RATE: 100 BPM | TEMPERATURE: 98.6 F

## 2023-10-05 PROCEDURE — G0152 HHCP-SERV OF OT,EA 15 MIN: HCPCS

## 2023-10-05 PROCEDURE — G0299 HHS/HOSPICE OF RN EA 15 MIN: HCPCS

## 2023-10-05 NOTE — HOME HEALTH
Routine Visit Note: Patient resting on couch at time of SN arrival. Patient states that she is desperate to get her medication. States that she has no way to get meds because her son is out of town and she has been out of many meds for days. Patient has 10 empty medication bottles. Patient only has 4 meds in the home. SN called all empty meds into [facility] [place]. Sn also called and spoke with Kamaljit in the pharmacy and got patient set up with mail in pharmacy. Kamaljit states that it takes 1-2 days to receive meds in the mail after they are ordered. She states that many of patient's meds have not been filled for a few months and she is usure if MD will sign for refills. Patient states that her PCP moved to [place] and she does not have a family provider. SN instructed patient to call office ASAP and make appointment with PCP to get established so she can get refills. Patient agrees. Vitals WNL    Skill/education provided: CP assess, falls/safety assess, med teaching/assess, a-fib monitoring/assess, GI assess/teach    Patient/caregiver response: verbalized understanding    Plan for next visit: CP assess, falls/safety assess, med teaching/assess for changes, assess if patient has the correct meds in the home, assess appetite    Other pertinent info: NA

## 2023-10-10 ENCOUNTER — HOME CARE VISIT (OUTPATIENT)
Dept: HOME HEALTH SERVICES | Facility: HOME HEALTHCARE | Age: 83
End: 2023-10-10
Payer: MEDICARE

## 2023-10-10 ENCOUNTER — READMISSION MANAGEMENT (OUTPATIENT)
Dept: CALL CENTER | Facility: HOSPITAL | Age: 83
End: 2023-10-10
Payer: MEDICARE

## 2023-10-10 VITALS
TEMPERATURE: 97.7 F | HEART RATE: 99 BPM | RESPIRATION RATE: 16 BRPM | OXYGEN SATURATION: 97 % | SYSTOLIC BLOOD PRESSURE: 118 MMHG | DIASTOLIC BLOOD PRESSURE: 60 MMHG

## 2023-10-10 PROCEDURE — G0157 HHC PT ASSISTANT EA 15: HCPCS

## 2023-10-10 NOTE — OUTREACH NOTE
Medical Week 3 Survey      Flowsheet Row Responses   [facility] patient discharged from? [facility]   Does the patient have one of the following disease processes/diagnoses(primary or secondary)? Other   Week 3 attempt successful? No   Unsuccessful attempts Attempt 1   Revoke Decline to participate            Yuki SCHWAB - Licensed Nurse

## 2023-10-11 NOTE — HOME HEALTH
pt does not answer the phone,  per son.   spoke to pts son-  reporting the pt is home and it is ok to go out for a visit.    arrived at the scheduled time and pt answered the door stating she was sick and unable to participate.    attempted to discuss moving the visit to another day and pt shut the door.   called the pts son,  he was very apologetic and would attempt to discuss the need for compliance with the patient but stated the patient often does not answer his calls.    son also reporting he is considering placement in retirement, this was attempted 3 years ago and pt refused.

## 2023-10-12 ENCOUNTER — HOME CARE VISIT (OUTPATIENT)
Dept: HOME HEALTH SERVICES | Facility: HOME HEALTHCARE | Age: 83
End: 2023-10-12
Payer: MEDICARE

## 2023-10-13 ENCOUNTER — HOME CARE VISIT (OUTPATIENT)
Dept: HOME HEALTH SERVICES | Facility: HOME HEALTHCARE | Age: 83
End: 2023-10-13
Payer: MEDICARE

## 2023-10-18 ENCOUNTER — HOME CARE VISIT (OUTPATIENT)
Dept: HOME HEALTH SERVICES | Facility: HOME HEALTHCARE | Age: 83
End: 2023-10-18
Payer: MEDICARE

## 2023-10-19 ENCOUNTER — HOME CARE VISIT (OUTPATIENT)
Dept: HOME HEALTH SERVICES | Facility: HOME HEALTHCARE | Age: 83
End: 2023-10-19
Payer: MEDICARE

## 2023-10-20 ENCOUNTER — HOME CARE VISIT (OUTPATIENT)
Dept: HOME HEALTH SERVICES | Facility: HOME HEALTHCARE | Age: 83
End: 2023-10-20
Payer: MEDICARE

## 2023-10-24 ENCOUNTER — HOME CARE VISIT (OUTPATIENT)
Dept: HOME HEALTH SERVICES | Facility: HOME HEALTHCARE | Age: 83
End: 2023-10-24
Payer: MEDICARE

## 2023-10-24 NOTE — CASE COMMUNICATION
When contacted to schedule PT visit this day (10/24/2023) patient advised that she was feeling ill and sneezing. She consented to PT visit being moved to 10/26/2023.

## 2023-10-26 ENCOUNTER — HOME CARE VISIT (OUTPATIENT)
Dept: HOME HEALTH SERVICES | Facility: HOME HEALTHCARE | Age: 83
End: 2023-10-26
Payer: MEDICARE

## 2023-10-26 NOTE — CASE COMMUNICATION
Informational Purposes Only:  Per home health agency protocol, MD must be notified of any cancelled visits.    Patient missed a PT visit from [facility] on 10/26/2023 therefore the prescribed frequency was not met.  Reason: Patient did not answer phone. Unable to leave voice message (not set up).  It must be noted patient did answer phone on 10/24/2023 when PT called, but indicated that she felt PT should not come to home due to  her experiencing cold symptoms.   There are  no further PT visits scheduled.  No further PT visits intended at this time.  Will not request further visits due to the number of missed visits, unless further visits requested.

## 2023-10-27 ENCOUNTER — HOME CARE VISIT (OUTPATIENT)
Dept: HOME HEALTH SERVICES | Facility: HOME HEALTHCARE | Age: 83
End: 2023-10-27
Payer: MEDICARE

## 2023-11-01 ENCOUNTER — HOME CARE VISIT (OUTPATIENT)
Dept: HOME HEALTH SERVICES | Facility: HOME HEALTHCARE | Age: 83
End: 2023-11-01
Payer: MEDICARE

## 2023-11-03 NOTE — HOME HEALTH
PT Discharge Summary: The patient is an 83 year old female admitted to home health services by SN on 9/9/2023 with history of hospitalization due to extreme weakness and nausea. PT assessment on 9/12/2023 found the patient's mobility to be compromised due to reported nausea.    The patient accepted the PT interventions of therapeutic exercise, gait training and home exercise program (HEP) instruction.    She was last seen in the home by PT on 10/10/2023 then subsequently had multiple missed visits primarily due to not answering the phone.    Unable to determine patient's functional mobility due to non-compliance with home health plan of care.    Patient discharged from PT services.

## 2024-01-10 ENCOUNTER — APPOINTMENT (OUTPATIENT)
Dept: GENERAL RADIOLOGY | Facility: HOSPITAL | Age: 84
End: 2024-01-10
Payer: MEDICARE

## 2024-01-10 ENCOUNTER — HOSPITAL ENCOUNTER (INPATIENT)
Facility: HOSPITAL | Age: 84
LOS: 3 days | Discharge: NURSING, REHAB OR LONG-TERM CARE | End: 2024-01-14
Attending: EMERGENCY MEDICINE | Admitting: STUDENT IN AN ORGANIZED HEALTH CARE EDUCATION/TRAINING PROGRAM
Payer: MEDICARE

## 2024-01-10 DIAGNOSIS — M54.50 ACUTE LOW BACK PAIN, UNSPECIFIED BACK PAIN LATERALITY, UNSPECIFIED WHETHER SCIATICA PRESENT: ICD-10-CM

## 2024-01-10 DIAGNOSIS — G62.9 POLYNEUROPATHY, UNSPECIFIED: Chronic | ICD-10-CM

## 2024-01-10 DIAGNOSIS — I48.91 ATRIAL FIBRILLATION WITH RVR: Primary | ICD-10-CM

## 2024-01-10 PROBLEM — R53.1 WEAKNESS: Status: ACTIVE | Noted: 2024-01-10

## 2024-01-10 LAB
ANION GAP SERPL CALCULATED.3IONS-SCNC: 12 MMOL/L (ref 5–15)
BASOPHILS # BLD AUTO: 0 10*3/MM3 (ref 0–0.2)
BASOPHILS NFR BLD AUTO: 0.3 % (ref 0–1.5)
BILIRUB UR QL STRIP: NEGATIVE
BUN SERPL-MCNC: 18 MG/DL (ref 8–23)
BUN/CREAT SERPL: 22.8 (ref 7–25)
CALCIUM SPEC-SCNC: 9.1 MG/DL (ref 8.6–10.5)
CHLORIDE SERPL-SCNC: 104 MMOL/L (ref 98–107)
CLARITY UR: CLEAR
CO2 SERPL-SCNC: 25 MMOL/L (ref 22–29)
COLOR UR: YELLOW
CREAT SERPL-MCNC: 0.79 MG/DL (ref 0.57–1)
DEPRECATED RDW RBC AUTO: 49.4 FL (ref 37–54)
DIGOXIN SERPL-MCNC: <0.3 NG/ML (ref 0.6–1.2)
EGFRCR SERPLBLD CKD-EPI 2021: 74.8 ML/MIN/1.73
EOSINOPHIL # BLD AUTO: 0.1 10*3/MM3 (ref 0–0.4)
EOSINOPHIL NFR BLD AUTO: 1.8 % (ref 0.3–6.2)
ERYTHROCYTE [DISTWIDTH] IN BLOOD BY AUTOMATED COUNT: 16.1 % (ref 12.3–15.4)
GLUCOSE SERPL-MCNC: 105 MG/DL (ref 65–99)
GLUCOSE UR STRIP-MCNC: NEGATIVE MG/DL
HCT VFR BLD AUTO: 36.8 % (ref 34–46.6)
HGB BLD-MCNC: 11.9 G/DL (ref 12–15.9)
HGB UR QL STRIP.AUTO: NEGATIVE
KETONES UR QL STRIP: NEGATIVE
LEUKOCYTE ESTERASE UR QL STRIP.AUTO: NEGATIVE
LYMPHOCYTES # BLD AUTO: 1.9 10*3/MM3 (ref 0.7–3.1)
LYMPHOCYTES NFR BLD AUTO: 26.4 % (ref 19.6–45.3)
MCH RBC QN AUTO: 28.2 PG (ref 26.6–33)
MCHC RBC AUTO-ENTMCNC: 32.3 G/DL (ref 31.5–35.7)
MCV RBC AUTO: 87.4 FL (ref 79–97)
MONOCYTES # BLD AUTO: 0.5 10*3/MM3 (ref 0.1–0.9)
MONOCYTES NFR BLD AUTO: 7 % (ref 5–12)
NEUTROPHILS NFR BLD AUTO: 4.8 10*3/MM3 (ref 1.7–7)
NEUTROPHILS NFR BLD AUTO: 64.5 % (ref 42.7–76)
NITRITE UR QL STRIP: NEGATIVE
NRBC BLD AUTO-RTO: 0 /100 WBC (ref 0–0.2)
PH UR STRIP.AUTO: 7.5 [PH] (ref 5–8)
PLATELET # BLD AUTO: 272 10*3/MM3 (ref 140–450)
PMV BLD AUTO: 8.1 FL (ref 6–12)
POTASSIUM SERPL-SCNC: 3.9 MMOL/L (ref 3.5–5.2)
PROT UR QL STRIP: NEGATIVE
RBC # BLD AUTO: 4.22 10*6/MM3 (ref 3.77–5.28)
SODIUM SERPL-SCNC: 141 MMOL/L (ref 136–145)
SP GR UR STRIP: 1.01 (ref 1–1.03)
UROBILINOGEN UR QL STRIP: NORMAL
WBC NRBC COR # BLD AUTO: 7.4 10*3/MM3 (ref 3.4–10.8)

## 2024-01-10 PROCEDURE — 99285 EMERGENCY DEPT VISIT HI MDM: CPT

## 2024-01-10 PROCEDURE — 80162 ASSAY OF DIGOXIN TOTAL: CPT | Performed by: EMERGENCY MEDICINE

## 2024-01-10 PROCEDURE — 72100 X-RAY EXAM L-S SPINE 2/3 VWS: CPT

## 2024-01-10 PROCEDURE — 83880 ASSAY OF NATRIURETIC PEPTIDE: CPT | Performed by: STUDENT IN AN ORGANIZED HEALTH CARE EDUCATION/TRAINING PROGRAM

## 2024-01-10 PROCEDURE — 84439 ASSAY OF FREE THYROXINE: CPT | Performed by: STUDENT IN AN ORGANIZED HEALTH CARE EDUCATION/TRAINING PROGRAM

## 2024-01-10 PROCEDURE — 99291 CRITICAL CARE FIRST HOUR: CPT

## 2024-01-10 PROCEDURE — 80048 BASIC METABOLIC PNL TOTAL CA: CPT | Performed by: EMERGENCY MEDICINE

## 2024-01-10 PROCEDURE — G0378 HOSPITAL OBSERVATION PER HR: HCPCS

## 2024-01-10 PROCEDURE — 81003 URINALYSIS AUTO W/O SCOPE: CPT | Performed by: EMERGENCY MEDICINE

## 2024-01-10 PROCEDURE — 93005 ELECTROCARDIOGRAM TRACING: CPT | Performed by: EMERGENCY MEDICINE

## 2024-01-10 PROCEDURE — 25010000002 DIGOXIN PER 500 MCG: Performed by: EMERGENCY MEDICINE

## 2024-01-10 PROCEDURE — 84443 ASSAY THYROID STIM HORMONE: CPT | Performed by: STUDENT IN AN ORGANIZED HEALTH CARE EDUCATION/TRAINING PROGRAM

## 2024-01-10 PROCEDURE — 93005 ELECTROCARDIOGRAM TRACING: CPT

## 2024-01-10 PROCEDURE — 84484 ASSAY OF TROPONIN QUANT: CPT | Performed by: STUDENT IN AN ORGANIZED HEALTH CARE EDUCATION/TRAINING PROGRAM

## 2024-01-10 PROCEDURE — 36415 COLL VENOUS BLD VENIPUNCTURE: CPT

## 2024-01-10 PROCEDURE — 85025 COMPLETE CBC W/AUTO DIFF WBC: CPT | Performed by: EMERGENCY MEDICINE

## 2024-01-10 PROCEDURE — 83735 ASSAY OF MAGNESIUM: CPT | Performed by: STUDENT IN AN ORGANIZED HEALTH CARE EDUCATION/TRAINING PROGRAM

## 2024-01-10 RX ORDER — SODIUM CHLORIDE 0.9 % (FLUSH) 0.9 %
10 SYRINGE (ML) INJECTION AS NEEDED
Status: DISCONTINUED | OUTPATIENT
Start: 2024-01-10 | End: 2024-01-14 | Stop reason: HOSPADM

## 2024-01-10 RX ORDER — METOPROLOL TARTRATE 1 MG/ML
5 INJECTION, SOLUTION INTRAVENOUS ONCE
Status: COMPLETED | OUTPATIENT
Start: 2024-01-10 | End: 2024-01-10

## 2024-01-10 RX ORDER — DIGOXIN 0.25 MG/ML
250 INJECTION INTRAMUSCULAR; INTRAVENOUS ONCE
Status: COMPLETED | OUTPATIENT
Start: 2024-01-10 | End: 2024-01-10

## 2024-01-10 RX ADMIN — DIGOXIN 250 MCG: 0.25 INJECTION INTRAMUSCULAR; INTRAVENOUS at 23:59

## 2024-01-10 RX ADMIN — METOPROLOL TARTRATE 5 MG: 1 INJECTION, SOLUTION INTRAVENOUS at 22:53

## 2024-01-10 NOTE — Clinical Note
Level of Care: Telemetry [5]   Diagnosis: Atrial fibrillation with RVR [[id number]]   Admitting Physician: MARCO JONES [[id number]]   Attending Physician: DONTA RUSSO [[id number]]

## 2024-01-11 PROBLEM — R41.81 AGE-RELATED COGNITIVE DECLINE: Chronic | Status: ACTIVE | Noted: 2021-07-27

## 2024-01-11 PROBLEM — E66.09 CLASS 1 OBESITY DUE TO EXCESS CALORIES WITH SERIOUS COMORBIDITY AND BODY MASS INDEX (BMI) OF 30.0 TO 30.9 IN ADULT: Status: RESOLVED | Noted: 2023-01-15 | Resolved: 2024-01-11

## 2024-01-11 PROBLEM — R42 LIGHTHEADEDNESS: Status: RESOLVED | Noted: 2018-07-05 | Resolved: 2024-01-11

## 2024-01-11 PROBLEM — R10.10 PAIN OF UPPER ABDOMEN: Status: RESOLVED | Noted: 2023-09-12 | Resolved: 2024-01-11

## 2024-01-11 PROBLEM — Z91.81 HISTORY OF FALLING: Status: RESOLVED | Noted: 2021-07-27 | Resolved: 2024-01-11

## 2024-01-11 PROBLEM — R60.9 EDEMA, UNSPECIFIED: Status: RESOLVED | Noted: 2021-07-27 | Resolved: 2024-01-11

## 2024-01-11 PROBLEM — M54.16 LUMBAR RADICULOPATHY: Status: RESOLVED | Noted: 2019-10-17 | Resolved: 2024-01-11

## 2024-01-11 PROBLEM — M48.02 CERVICAL STENOSIS OF SPINAL CANAL: Status: RESOLVED | Noted: 2020-10-08 | Resolved: 2024-01-11

## 2024-01-11 PROBLEM — I48.92 ATRIAL FLUTTER WITH RAPID VENTRICULAR RESPONSE: Chronic | Status: ACTIVE | Noted: 2023-09-12

## 2024-01-11 PROBLEM — M19.90 ARTHRITIS: Chronic | Status: ACTIVE | Noted: 2017-05-04

## 2024-01-11 PROBLEM — G47.00 INSOMNIA, UNSPECIFIED: Status: RESOLVED | Noted: 2021-07-27 | Resolved: 2024-01-11

## 2024-01-11 PROBLEM — M25.50 POLYARTHRALGIA: Status: RESOLVED | Noted: 2017-03-22 | Resolved: 2024-01-11

## 2024-01-11 PROBLEM — D64.9 ANEMIA: Status: RESOLVED | Noted: 2019-12-07 | Resolved: 2024-01-11

## 2024-01-11 PROBLEM — M43.17 ACQUIRED SPONDYLOLISTHESIS OF LUMBOSACRAL REGION: Status: RESOLVED | Noted: 2020-09-08 | Resolved: 2024-01-11

## 2024-01-11 PROBLEM — M51.369 OTHER INTERVERTEBRAL DISC DEGENERATION, LUMBAR REGION: Status: RESOLVED | Noted: 2021-07-27 | Resolved: 2024-01-11

## 2024-01-11 PROBLEM — R11.0 NAUSEA: Status: RESOLVED | Noted: 2023-09-12 | Resolved: 2024-01-11

## 2024-01-11 PROBLEM — R13.19 OTHER DYSPHAGIA: Status: RESOLVED | Noted: 2021-07-27 | Resolved: 2024-01-11

## 2024-01-11 PROBLEM — I36.1 NONRHEUMATIC TRICUSPID VALVE REGURGITATION: Status: RESOLVED | Noted: 2020-01-23 | Resolved: 2024-01-11

## 2024-01-11 PROBLEM — I50.22 CHRONIC SYSTOLIC CHF (CONGESTIVE HEART FAILURE): Chronic | Status: RESOLVED | Noted: 2022-03-22 | Resolved: 2024-01-11

## 2024-01-11 PROBLEM — M50.30 DDD (DEGENERATIVE DISC DISEASE), CERVICAL: Status: RESOLVED | Noted: 2020-10-08 | Resolved: 2024-01-11

## 2024-01-11 PROBLEM — R26.81 UNSTEADINESS ON FEET: Status: RESOLVED | Noted: 2021-07-27 | Resolved: 2024-01-11

## 2024-01-11 PROBLEM — I49.5 SICK SINUS SYNDROME: Status: RESOLVED | Noted: 2023-01-14 | Resolved: 2024-01-11

## 2024-01-11 PROBLEM — E55.9 VITAMIN D DEFICIENCY: Status: RESOLVED | Noted: 2020-10-08 | Resolved: 2024-01-11

## 2024-01-11 PROBLEM — F32.9 MAJOR DEPRESSIVE DISORDER, SINGLE EPISODE, UNSPECIFIED: Status: RESOLVED | Noted: 2024-01-11 | Resolved: 2024-01-11

## 2024-01-11 PROBLEM — R19.7 DIARRHEA WITH DEHYDRATION: Status: RESOLVED | Noted: 2023-09-05 | Resolved: 2024-01-11

## 2024-01-11 PROBLEM — Z79.899 POLYPHARMACY: Chronic | Status: RESOLVED | Noted: 2021-04-03 | Resolved: 2024-01-11

## 2024-01-11 PROBLEM — M43.16 SPONDYLOLISTHESIS, LUMBAR REGION: Status: RESOLVED | Noted: 2021-07-27 | Resolved: 2024-01-11

## 2024-01-11 PROBLEM — I48.91 AF (ATRIAL FIBRILLATION): Status: ACTIVE | Noted: 2024-01-11

## 2024-01-11 PROBLEM — M51.36 OTHER INTERVERTEBRAL DISC DEGENERATION, LUMBAR REGION: Status: RESOLVED | Noted: 2021-07-27 | Resolved: 2024-01-11

## 2024-01-11 PROBLEM — Z95.0 PRESENCE OF CARDIAC PACEMAKER: Chronic | Status: ACTIVE | Noted: 2023-02-19

## 2024-01-11 PROBLEM — F32.9 MAJOR DEPRESSIVE DISORDER, SINGLE EPISODE, UNSPECIFIED: Status: ACTIVE | Noted: 2024-01-11

## 2024-01-11 PROBLEM — K21.9 GASTRO-ESOPHAGEAL REFLUX DISEASE WITHOUT ESOPHAGITIS: Chronic | Status: ACTIVE | Noted: 2021-07-27

## 2024-01-11 PROBLEM — E66.811 CLASS 1 OBESITY DUE TO EXCESS CALORIES WITH SERIOUS COMORBIDITY AND BODY MASS INDEX (BMI) OF 30.0 TO 30.9 IN ADULT: Status: RESOLVED | Noted: 2023-01-15 | Resolved: 2024-01-11

## 2024-01-11 PROBLEM — Z79.899 OTHER LONG TERM (CURRENT) DRUG THERAPY: Status: RESOLVED | Noted: 2017-03-22 | Resolved: 2024-01-11

## 2024-01-11 PROBLEM — R00.2 PALPITATIONS: Status: RESOLVED | Noted: 2023-09-18 | Resolved: 2024-01-11

## 2024-01-11 PROBLEM — Z74.1 NEED FOR ASSISTANCE WITH PERSONAL CARE: Status: RESOLVED | Noted: 2021-07-27 | Resolved: 2024-01-11

## 2024-01-11 PROBLEM — M46.1 SACROILIITIS: Status: RESOLVED | Noted: 2018-04-13 | Resolved: 2024-01-11

## 2024-01-11 PROBLEM — G89.4 CHRONIC PAIN DISORDER: Status: RESOLVED | Noted: 2021-07-27 | Resolved: 2024-01-11

## 2024-01-11 PROBLEM — I34.0 MODERATE MITRAL REGURGITATION: Status: RESOLVED | Noted: 2023-01-05 | Resolved: 2024-01-11

## 2024-01-11 PROBLEM — I50.22 CHRONIC HFREF (HEART FAILURE WITH REDUCED EJECTION FRACTION): Chronic | Status: ACTIVE | Noted: 2023-09-07

## 2024-01-11 PROBLEM — I95.9 HYPOTENSION: Status: RESOLVED | Noted: 2023-01-15 | Resolved: 2024-01-11

## 2024-01-11 LAB
ANION GAP SERPL CALCULATED.3IONS-SCNC: 10 MMOL/L (ref 5–15)
ANION GAP SERPL CALCULATED.3IONS-SCNC: 10 MMOL/L (ref 5–15)
BASOPHILS # BLD AUTO: 0 10*3/MM3 (ref 0–0.2)
BASOPHILS # BLD AUTO: 0.1 10*3/MM3 (ref 0–0.2)
BASOPHILS NFR BLD AUTO: 0.3 % (ref 0–1.5)
BASOPHILS NFR BLD AUTO: 0.9 % (ref 0–1.5)
BUN SERPL-MCNC: 18 MG/DL (ref 8–23)
BUN SERPL-MCNC: 18 MG/DL (ref 8–23)
BUN/CREAT SERPL: 22 (ref 7–25)
BUN/CREAT SERPL: 22.5 (ref 7–25)
CA-I SERPL ISE-MCNC: 1.22 MMOL/L (ref 1.2–1.3)
CALCIUM SPEC-SCNC: 9.1 MG/DL (ref 8.6–10.5)
CALCIUM SPEC-SCNC: 9.4 MG/DL (ref 8.6–10.5)
CHLORIDE SERPL-SCNC: 103 MMOL/L (ref 98–107)
CHLORIDE SERPL-SCNC: 105 MMOL/L (ref 98–107)
CO2 SERPL-SCNC: 26 MMOL/L (ref 22–29)
CO2 SERPL-SCNC: 27 MMOL/L (ref 22–29)
CREAT SERPL-MCNC: 0.8 MG/DL (ref 0.57–1)
CREAT SERPL-MCNC: 0.82 MG/DL (ref 0.57–1)
DEPRECATED RDW RBC AUTO: 50.3 FL (ref 37–54)
DEPRECATED RDW RBC AUTO: 50.8 FL (ref 37–54)
EGFRCR SERPLBLD CKD-EPI 2021: 71.5 ML/MIN/1.73
EGFRCR SERPLBLD CKD-EPI 2021: 73.7 ML/MIN/1.73
EOSINOPHIL # BLD AUTO: 0.1 10*3/MM3 (ref 0–0.4)
EOSINOPHIL # BLD AUTO: 0.2 10*3/MM3 (ref 0–0.4)
EOSINOPHIL NFR BLD AUTO: 1.9 % (ref 0.3–6.2)
EOSINOPHIL NFR BLD AUTO: 2.1 % (ref 0.3–6.2)
ERYTHROCYTE [DISTWIDTH] IN BLOOD BY AUTOMATED COUNT: 16.3 % (ref 12.3–15.4)
ERYTHROCYTE [DISTWIDTH] IN BLOOD BY AUTOMATED COUNT: 16.5 % (ref 12.3–15.4)
GLUCOSE SERPL-MCNC: 100 MG/DL (ref 65–99)
GLUCOSE SERPL-MCNC: 96 MG/DL (ref 65–99)
HCT VFR BLD AUTO: 36.6 % (ref 34–46.6)
HCT VFR BLD AUTO: 38 % (ref 34–46.6)
HGB BLD-MCNC: 11.7 G/DL (ref 12–15.9)
HGB BLD-MCNC: 12.1 G/DL (ref 12–15.9)
HOLD SPECIMEN: NORMAL
INR PPP: 1.02 (ref 0.93–1.1)
LYMPHOCYTES # BLD AUTO: 2.2 10*3/MM3 (ref 0.7–3.1)
LYMPHOCYTES # BLD AUTO: 2.6 10*3/MM3 (ref 0.7–3.1)
LYMPHOCYTES NFR BLD AUTO: 28.9 % (ref 19.6–45.3)
LYMPHOCYTES NFR BLD AUTO: 33.8 % (ref 19.6–45.3)
MAGNESIUM SERPL-MCNC: 2 MG/DL (ref 1.6–2.4)
MAGNESIUM SERPL-MCNC: 2 MG/DL (ref 1.6–2.4)
MAGNESIUM SERPL-MCNC: 2.4 MG/DL (ref 1.6–2.4)
MCH RBC QN AUTO: 28 PG (ref 26.6–33)
MCH RBC QN AUTO: 28.5 PG (ref 26.6–33)
MCHC RBC AUTO-ENTMCNC: 31.7 G/DL (ref 31.5–35.7)
MCHC RBC AUTO-ENTMCNC: 32.1 G/DL (ref 31.5–35.7)
MCV RBC AUTO: 88.1 FL (ref 79–97)
MCV RBC AUTO: 88.8 FL (ref 79–97)
MONOCYTES # BLD AUTO: 0.6 10*3/MM3 (ref 0.1–0.9)
MONOCYTES # BLD AUTO: 0.7 10*3/MM3 (ref 0.1–0.9)
MONOCYTES NFR BLD AUTO: 7.5 % (ref 5–12)
MONOCYTES NFR BLD AUTO: 8.9 % (ref 5–12)
NEUTROPHILS NFR BLD AUTO: 4.3 10*3/MM3 (ref 1.7–7)
NEUTROPHILS NFR BLD AUTO: 4.5 10*3/MM3 (ref 1.7–7)
NEUTROPHILS NFR BLD AUTO: 56.3 % (ref 42.7–76)
NEUTROPHILS NFR BLD AUTO: 59.4 % (ref 42.7–76)
NRBC BLD AUTO-RTO: 0 /100 WBC (ref 0–0.2)
NRBC BLD AUTO-RTO: 0 /100 WBC (ref 0–0.2)
NT-PROBNP SERPL-MCNC: 1563 PG/ML (ref 0–1800)
PLATELET # BLD AUTO: 271 10*3/MM3 (ref 140–450)
PLATELET # BLD AUTO: 271 10*3/MM3 (ref 140–450)
PMV BLD AUTO: 7.8 FL (ref 6–12)
PMV BLD AUTO: 7.8 FL (ref 6–12)
POTASSIUM SERPL-SCNC: 3.9 MMOL/L (ref 3.5–5.2)
POTASSIUM SERPL-SCNC: 4.3 MMOL/L (ref 3.5–5.2)
PROTHROMBIN TIME: 11.1 SECONDS (ref 9.6–11.7)
QT INTERVAL: 324 MS
QTC INTERVAL: 496 MS
RBC # BLD AUTO: 4.12 10*6/MM3 (ref 3.77–5.28)
RBC # BLD AUTO: 4.31 10*6/MM3 (ref 3.77–5.28)
SODIUM SERPL-SCNC: 140 MMOL/L (ref 136–145)
SODIUM SERPL-SCNC: 141 MMOL/L (ref 136–145)
T4 FREE SERPL-MCNC: 0.81 NG/DL (ref 0.93–1.7)
TROPONIN T SERPL HS-MCNC: 11 NG/L
TROPONIN T SERPL HS-MCNC: 13 NG/L
TSH SERPL DL<=0.05 MIU/L-ACNC: 7.38 UIU/ML (ref 0.27–4.2)
WBC NRBC COR # BLD AUTO: 7.6 10*3/MM3 (ref 3.4–10.8)
WBC NRBC COR # BLD AUTO: 7.6 10*3/MM3 (ref 3.4–10.8)
WHOLE BLOOD HOLD COAG: NORMAL

## 2024-01-11 PROCEDURE — 25010000002 MORPHINE PER 10 MG: Performed by: INTERNAL MEDICINE

## 2024-01-11 PROCEDURE — 25010000002 ONDANSETRON PER 1 MG: Performed by: STUDENT IN AN ORGANIZED HEALTH CARE EDUCATION/TRAINING PROGRAM

## 2024-01-11 PROCEDURE — 80048 BASIC METABOLIC PNL TOTAL CA: CPT | Performed by: STUDENT IN AN ORGANIZED HEALTH CARE EDUCATION/TRAINING PROGRAM

## 2024-01-11 PROCEDURE — 97166 OT EVAL MOD COMPLEX 45 MIN: CPT | Performed by: OCCUPATIONAL THERAPIST

## 2024-01-11 PROCEDURE — 85025 COMPLETE CBC W/AUTO DIFF WBC: CPT | Performed by: STUDENT IN AN ORGANIZED HEALTH CARE EDUCATION/TRAINING PROGRAM

## 2024-01-11 PROCEDURE — 93005 ELECTROCARDIOGRAM TRACING: CPT | Performed by: STUDENT IN AN ORGANIZED HEALTH CARE EDUCATION/TRAINING PROGRAM

## 2024-01-11 PROCEDURE — 25010000002 MORPHINE PER 10 MG: Performed by: EMERGENCY MEDICINE

## 2024-01-11 PROCEDURE — 97161 PT EVAL LOW COMPLEX 20 MIN: CPT

## 2024-01-11 PROCEDURE — 84484 ASSAY OF TROPONIN QUANT: CPT | Performed by: STUDENT IN AN ORGANIZED HEALTH CARE EDUCATION/TRAINING PROGRAM

## 2024-01-11 PROCEDURE — 83735 ASSAY OF MAGNESIUM: CPT | Performed by: STUDENT IN AN ORGANIZED HEALTH CARE EDUCATION/TRAINING PROGRAM

## 2024-01-11 PROCEDURE — 93010 ELECTROCARDIOGRAM REPORT: CPT | Performed by: INTERNAL MEDICINE

## 2024-01-11 PROCEDURE — 25010000002 DIGOXIN PER 500 MCG

## 2024-01-11 PROCEDURE — 82330 ASSAY OF CALCIUM: CPT | Performed by: STUDENT IN AN ORGANIZED HEALTH CARE EDUCATION/TRAINING PROGRAM

## 2024-01-11 PROCEDURE — 85610 PROTHROMBIN TIME: CPT | Performed by: STUDENT IN AN ORGANIZED HEALTH CARE EDUCATION/TRAINING PROGRAM

## 2024-01-11 PROCEDURE — 25010000002 MAGNESIUM SULFATE IN D5W 1G/100ML (PREMIX) 1-5 GM/100ML-% SOLUTION: Performed by: STUDENT IN AN ORGANIZED HEALTH CARE EDUCATION/TRAINING PROGRAM

## 2024-01-11 RX ORDER — ACETAMINOPHEN 325 MG/1
650 TABLET ORAL EVERY 4 HOURS PRN
Status: DISCONTINUED | OUTPATIENT
Start: 2024-01-11 | End: 2024-01-14 | Stop reason: HOSPADM

## 2024-01-11 RX ORDER — MORPHINE SULFATE 2 MG/ML
2 INJECTION, SOLUTION INTRAMUSCULAR; INTRAVENOUS ONCE
Status: COMPLETED | OUTPATIENT
Start: 2024-01-11 | End: 2024-01-11

## 2024-01-11 RX ORDER — SODIUM CHLORIDE 9 MG/ML
40 INJECTION, SOLUTION INTRAVENOUS AS NEEDED
Status: DISCONTINUED | OUTPATIENT
Start: 2024-01-11 | End: 2024-01-14 | Stop reason: HOSPADM

## 2024-01-11 RX ORDER — BISACODYL 5 MG/1
5 TABLET, DELAYED RELEASE ORAL DAILY PRN
Status: DISCONTINUED | OUTPATIENT
Start: 2024-01-11 | End: 2024-01-14 | Stop reason: HOSPADM

## 2024-01-11 RX ORDER — NITROGLYCERIN 0.4 MG/1
0.4 TABLET SUBLINGUAL
Status: DISCONTINUED | OUTPATIENT
Start: 2024-01-11 | End: 2024-01-14 | Stop reason: HOSPADM

## 2024-01-11 RX ORDER — AMOXICILLIN 250 MG
2 CAPSULE ORAL 2 TIMES DAILY
Status: DISCONTINUED | OUTPATIENT
Start: 2024-01-11 | End: 2024-01-14 | Stop reason: HOSPADM

## 2024-01-11 RX ORDER — DIGOXIN 0.25 MG/ML
250 INJECTION INTRAMUSCULAR; INTRAVENOUS ONCE
Status: COMPLETED | OUTPATIENT
Start: 2024-01-11 | End: 2024-01-11

## 2024-01-11 RX ORDER — PANTOPRAZOLE SODIUM 40 MG/10ML
40 INJECTION, POWDER, LYOPHILIZED, FOR SOLUTION INTRAVENOUS
Status: DISCONTINUED | OUTPATIENT
Start: 2024-01-11 | End: 2024-01-14 | Stop reason: HOSPADM

## 2024-01-11 RX ORDER — MAGNESIUM SULFATE 1 G/100ML
1 INJECTION INTRAVENOUS ONCE
Status: COMPLETED | OUTPATIENT
Start: 2024-01-11 | End: 2024-01-11

## 2024-01-11 RX ORDER — ACETAMINOPHEN 160 MG/5ML
650 SOLUTION ORAL EVERY 4 HOURS PRN
Status: DISCONTINUED | OUTPATIENT
Start: 2024-01-11 | End: 2024-01-14 | Stop reason: HOSPADM

## 2024-01-11 RX ORDER — HYDROCODONE BITARTRATE AND ACETAMINOPHEN 5; 325 MG/1; MG/1
1 TABLET ORAL EVERY 8 HOURS PRN
Status: DISCONTINUED | OUTPATIENT
Start: 2024-01-11 | End: 2024-01-14 | Stop reason: HOSPADM

## 2024-01-11 RX ORDER — POLYETHYLENE GLYCOL 3350 17 G/17G
17 POWDER, FOR SOLUTION ORAL DAILY PRN
Status: DISCONTINUED | OUTPATIENT
Start: 2024-01-11 | End: 2024-01-14 | Stop reason: HOSPADM

## 2024-01-11 RX ORDER — DIGOXIN 0.25 MG/ML
INJECTION INTRAMUSCULAR; INTRAVENOUS
Status: COMPLETED
Start: 2024-01-11 | End: 2024-01-11

## 2024-01-11 RX ORDER — DILTIAZEM HCL/D5W 125 MG/125
5-15 PLASTIC BAG, INJECTION (ML) INTRAVENOUS CONTINUOUS
Status: DISCONTINUED | OUTPATIENT
Start: 2024-01-11 | End: 2024-01-11

## 2024-01-11 RX ORDER — MORPHINE SULFATE 2 MG/ML
2 INJECTION, SOLUTION INTRAMUSCULAR; INTRAVENOUS ONCE
Status: DISCONTINUED | OUTPATIENT
Start: 2024-01-11 | End: 2024-01-11

## 2024-01-11 RX ORDER — SACUBITRIL AND VALSARTAN 24; 26 MG/1; MG/1
1 TABLET, FILM COATED ORAL 2 TIMES DAILY
COMMUNITY

## 2024-01-11 RX ORDER — SODIUM CHLORIDE 0.9 % (FLUSH) 0.9 %
10 SYRINGE (ML) INJECTION EVERY 12 HOURS SCHEDULED
Status: DISCONTINUED | OUTPATIENT
Start: 2024-01-11 | End: 2024-01-14 | Stop reason: HOSPADM

## 2024-01-11 RX ORDER — LIDOCAINE 50 MG/G
2 PATCH TOPICAL
Status: DISCONTINUED | OUTPATIENT
Start: 2024-01-11 | End: 2024-01-14 | Stop reason: HOSPADM

## 2024-01-11 RX ORDER — ONDANSETRON 2 MG/ML
4 INJECTION INTRAMUSCULAR; INTRAVENOUS EVERY 6 HOURS PRN
Status: DISCONTINUED | OUTPATIENT
Start: 2024-01-11 | End: 2024-01-14 | Stop reason: HOSPADM

## 2024-01-11 RX ORDER — DILTIAZEM HYDROCHLORIDE 240 MG/1
240 CAPSULE, COATED, EXTENDED RELEASE ORAL
Status: DISCONTINUED | OUTPATIENT
Start: 2024-01-11 | End: 2024-01-14 | Stop reason: HOSPADM

## 2024-01-11 RX ORDER — MORPHINE SULFATE 2 MG/ML
2 INJECTION, SOLUTION INTRAMUSCULAR; INTRAVENOUS EVERY 4 HOURS PRN
Status: DISCONTINUED | OUTPATIENT
Start: 2024-01-11 | End: 2024-01-14 | Stop reason: HOSPADM

## 2024-01-11 RX ORDER — SODIUM CHLORIDE 0.9 % (FLUSH) 0.9 %
10 SYRINGE (ML) INJECTION AS NEEDED
Status: DISCONTINUED | OUTPATIENT
Start: 2024-01-11 | End: 2024-01-14 | Stop reason: HOSPADM

## 2024-01-11 RX ORDER — ONDANSETRON 4 MG/1
4 TABLET, ORALLY DISINTEGRATING ORAL EVERY 6 HOURS PRN
Status: DISCONTINUED | OUTPATIENT
Start: 2024-01-11 | End: 2024-01-14 | Stop reason: HOSPADM

## 2024-01-11 RX ORDER — CHOLECALCIFEROL (VITAMIN D3) 125 MCG
5 CAPSULE ORAL NIGHTLY PRN
Status: DISCONTINUED | OUTPATIENT
Start: 2024-01-11 | End: 2024-01-14 | Stop reason: HOSPADM

## 2024-01-11 RX ORDER — ACETAMINOPHEN 650 MG/1
650 SUPPOSITORY RECTAL EVERY 4 HOURS PRN
Status: DISCONTINUED | OUTPATIENT
Start: 2024-01-11 | End: 2024-01-14 | Stop reason: HOSPADM

## 2024-01-11 RX ORDER — BISACODYL 10 MG
10 SUPPOSITORY, RECTAL RECTAL DAILY PRN
Status: DISCONTINUED | OUTPATIENT
Start: 2024-01-11 | End: 2024-01-14 | Stop reason: HOSPADM

## 2024-01-11 RX ADMIN — APIXABAN 5 MG: 5 TABLET, FILM COATED ORAL at 08:49

## 2024-01-11 RX ADMIN — HYDROCODONE BITARTRATE AND ACETAMINOPHEN 1 TABLET: 5; 325 TABLET ORAL at 08:49

## 2024-01-11 RX ADMIN — Medication 5 MG/HR: at 01:11

## 2024-01-11 RX ADMIN — HYDROCODONE BITARTRATE AND ACETAMINOPHEN 1 TABLET: 5; 325 TABLET ORAL at 17:59

## 2024-01-11 RX ADMIN — MORPHINE SULFATE 2 MG: 2 INJECTION, SOLUTION INTRAMUSCULAR; INTRAVENOUS at 01:27

## 2024-01-11 RX ADMIN — APIXABAN 5 MG: 5 TABLET, FILM COATED ORAL at 02:47

## 2024-01-11 RX ADMIN — MORPHINE SULFATE 2 MG: 2 INJECTION, SOLUTION INTRAMUSCULAR; INTRAVENOUS at 17:40

## 2024-01-11 RX ADMIN — Medication 10 ML: at 02:50

## 2024-01-11 RX ADMIN — Medication 10 ML: at 08:49

## 2024-01-11 RX ADMIN — NITROGLYCERIN 0.4 MG: 0.4 TABLET SUBLINGUAL at 02:49

## 2024-01-11 RX ADMIN — DIGOXIN 250 MCG: 0.25 INJECTION INTRAMUSCULAR; INTRAVENOUS at 00:18

## 2024-01-11 RX ADMIN — NITROGLYCERIN 0.4 MG: 0.4 TABLET SUBLINGUAL at 02:55

## 2024-01-11 RX ADMIN — MORPHINE SULFATE 2 MG: 2 INJECTION, SOLUTION INTRAMUSCULAR; INTRAVENOUS at 22:15

## 2024-01-11 RX ADMIN — PANTOPRAZOLE SODIUM 40 MG: 40 INJECTION, POWDER, FOR SOLUTION INTRAVENOUS at 06:27

## 2024-01-11 RX ADMIN — LIDOCAINE 2 PATCH: 50 PATCH TOPICAL at 04:05

## 2024-01-11 RX ADMIN — ACETAMINOPHEN 650 MG: 325 TABLET, FILM COATED ORAL at 07:11

## 2024-01-11 RX ADMIN — APIXABAN 5 MG: 5 TABLET, FILM COATED ORAL at 21:04

## 2024-01-11 RX ADMIN — DILTIAZEM HYDROCHLORIDE 240 MG: 240 CAPSULE, EXTENDED RELEASE ORAL at 12:29

## 2024-01-11 RX ADMIN — ONDANSETRON 4 MG: 2 INJECTION INTRAMUSCULAR; INTRAVENOUS at 17:41

## 2024-01-11 RX ADMIN — MAGNESIUM SULFATE IN DEXTROSE 1 G: 10 INJECTION, SOLUTION INTRAVENOUS at 03:14

## 2024-01-11 NOTE — DISCHARGE PLACEMENT REQUEST
"Catalina Carrera (83 y.o. Female)       Date of Birth   1940    Social Security Number       Address   [address] [place] [address] [place] [facility]    Home Phone   [phone]    MRN   [id number]       Yazidi   None    Marital Status                               Admission Date   1/10/24    Admission Type   Emergency    Admitting Provider   Francis Mullen DO    Attending Provider   Irving Oh MD    Department, Room/Bed   [facility] EMERGENCY DEPARTMENT, 17/17       Discharge Date       Discharge Disposition       Discharge Destination                                 Attending Provider: Irving Oh MD    Allergies: Baclofen, Codeine, Ibuprofen, Methocarbamol, Naproxen, Tizanidine Hcl, Tolmetin    Isolation: None   Infection: None   Code Status: CPR    Ht: 160 cm (63\")   Wt: 74.3 kg (163 lb 12.8 oz)    Admission Cmt: None   Principal Problem: Atrial fibrillation with RVR [I48.91]                   Active Insurance as of 1/10/2024       Primary Coverage       Payor Plan Insurance Group Employer/Plan Group    MEDICARE MEDICARE A & B        Payor Plan Address Payor Plan Phone Number Payor Plan Fax Number Effective Dates    [address] [phone]  4/1/2006 - None Entered    [facility] [id number]         Subscriber Name Subscriber Birth Date Member ID       CATALINA CARRERA 1940 [id number]               Secondary Coverage       Payor Plan Insurance Group Employer/Plan Group    ANTHEM BLUE CROSS ANTHEM BLUE CROSS BLUE SHIELD PPO [id number]       Payor Plan Address Payor Plan Phone Number Payor Plan Fax Number Effective Dates    [address] [phone]  2/2/2022 - None Entered    [facility] [id number]         Subscriber Name Subscriber Birth Date Member ID       CATALINA CARRERA 1940 [id number]               Tertiary Coverage       Payor Plan Insurance Group Employer/Plan Group    [place]        Payor Plan Address Payor Plan Phone " Number Payor Plan Fax Number Effective Dates    [address]   5/1/2022 - None Entered    [place] [address]         Subscriber Name Subscriber Birth Date Member ID       RADHA CARRERA 1940 [id number]                     Emergency Contacts        (Rel.) Home Phone Work Phone Mobile Phone    MADHAV DEAN (Son) [phone] -- [phone]    REILLY ESPINOZA (Friend) -- -- [phone]

## 2024-01-11 NOTE — ED NOTES
Pt reports 9/10 back pain despite having all available PRNs, Dr Oh sent Epic Secure chat to inform.

## 2024-01-11 NOTE — H&P
Hospitalist History and Physical     Catalina Moreno : 1940 MRN:[id number] LOS:0 ROOM: 10/10     Reason for admission: Atrial fibrillation with RVR     Assessment / Plan     Afib with RVR  -TSH, M  - PTT, INR  -EKG reviewed  -Echo 23, EF 51 to 55% with moderate aortic valve regurg and calcification  -Cardizem gtt   -Continuous heart monitor  -Monitor and replace magnesium, keep greater than 2, due to arrthymia  -Monitor and replace other electrolytes  -Consider metoprolol for PRN rate control  -Continue home Eliquis    Back pain/Weakness  -Radiology reviewed  -As needed pain medicine available  -PT/OT    Essential Hypertension, Chronic, Controlled; CHF; CAD with pacemaker in situ  -Continue home medications as appropriate   - Monitor with routine vital signs     Chronic Kidney Disease  -Stable  -Monitor and trend labs as appropriate    Hypothyroidism  -Continue levothyroxine when appropriate    Anxiety/Depression; fibromyalgia  -Continue home meds as appropriate        Code Status (Patient has no pulse and is not breathing): CPR (Attempt to Resuscitate)  Medical Interventions (Patient has pulse or is breathing): Full Support       Nutrition:   NPO Diet NPO Type: Strict NPO     DVT prophylaxis:  Medical and mechanical DVT prophylaxis orders are present.     History of Present illness     Catalina Moreno is a 83 y.o. female with PMH of paroxysmal atrial fibrillation, hypertension, CHF, CAD with pacemaker in situ, CKD, hypothyroid, anxiety, depression, and fibromyalgia presented to the hospital for low back pain, and was admitted with a principal diagnosis of Atrial fibrillation with RVR.  Patient states she has had an increase in her chronic low back pain over the last 1 to 2 days.  She states the pain is generalized does not radiate.  She denies numbness, tingling, bowel or bladder abnormalities.  While in the emergency department patient heart rate converted to atrial fibrillation with RVR rates in  the 140s.  Patient was given digoxin based on notes stating she was on this medication in the past.  However, patient states she has not taken this for quite some time.  Patient did not convert with digoxin was given IV push Lopressor still with no conversion and was started on a Cardizem drip.  Patient will be admitted for further evaluation and treatment.      ACP: Patient wishes to be full code with full intervention; she is unsure who will be her decision-maker if she is unable due to the relationship she has with her children.    Patient was seen and examined on 01/11/24 at 02:51 EST .    Subjective / Review of systems     Review of Systems   Constitutional:  Negative for diaphoresis and fatigue.   HENT:  Negative for congestion and sore throat.    Eyes:  Negative for pain and redness.   Respiratory:  Negative for cough and shortness of breath.    Cardiovascular:  Negative for chest pain and leg swelling.   Gastrointestinal:  Negative for abdominal pain, nausea and vomiting.   Endocrine: Negative for polydipsia and polyphagia.   Genitourinary:  Negative for dysuria and flank pain.   Musculoskeletal:  Positive for back pain. Negative for joint swelling.   Skin:  Negative for color change and pallor.   Neurological:  Positive for weakness. Negative for dizziness and seizures.   Psychiatric/Behavioral:  Negative for behavioral problems and confusion.         Past Medical/Surgical/Social/Family History & Allergies     Past Medical History:   Diagnosis Date    Acquired spondylolisthesis of lumbosacral region     Acute kidney injury 07/22/2022    Anxiety     Arthritis     Atrial fibrillation     paroxysmal    Broken shoulder     left-- due to fall 11-7-19 was at [facility]    Chronic systolic CHF (congestive heart failure) 01/05/2023    EF 36-40%    Coronary artery disease involving native coronary artery of native heart without angina pectoris 04/02/2021    nonobstructive    DDD (degenerative disc disease), cervical      DDD (degenerative disc disease), lumbar     Depression     Edema     9/2020 foot    GERD (gastroesophageal reflux disease)     H/O fall     Heart failure with mid-range ejection fraction 03/05/2022    EF 45% per 2D echo    Hypertension     Hypothyroidism     Insomnia     Low back pain     Moderate mitral regurgitation 01/05/2023    Neuropathy     Nonrheumatic tricuspid valve regurgitation     Pain in both feet     PONV (postoperative nausea and vomiting)     Pulmonary hypertension     Radiculopathy     Restless legs     Sacroiliitis     Sick sinus syndrome     Added automatically from request for surgery [id number]    Spondylolisthesis     Stage 3a chronic kidney disease     Urinary incontinence     Vitamin D deficiency       Past Surgical History:   Procedure Laterality Date    BACK SURGERY  07/19/2018    PDC &  PSF L3-L5 insitu    CARDIAC CATHETERIZATION N/A 4/2/2021    Procedure: Cardiac Catheterization/Vascular Study;  Surgeon: Barrett Evans MD;  Location: [facility] CATH INVASIVE LOCATION;  Service: Cardiovascular;  Laterality: N/A;    CARDIAC ELECTROPHYSIOLOGY PROCEDURE N/A 1/17/2023    Procedure: Pacemaker DC new;  Surgeon: Baljeet Valero MD;  Location: [facility] CATH INVASIVE LOCATION;  Service: Cardiovascular;  Laterality: N/A;    CHOLECYSTECTOMY      COLONOSCOPY      ENDOSCOPY N/A 9/14/2023    Procedure: ESOPHAGOGASTRODUODENOSCOPY;  Surgeon: Ulysses Lamas MD;  Location: [facility] ENDOSCOPY;  Service: Gastroenterology;  Laterality: N/A;  gastritis, inflammatory gastric polyp    HYSTERECTOMY      ROTATOR CUFF REPAIR Left       Social History     Socioeconomic History    Marital status:    Tobacco Use    Smoking status: Never    Smokeless tobacco: Never   Vaping Use    Vaping Use: Never used   Substance and Sexual Activity    Alcohol use: No    Drug use: Never    Sexual activity: Defer      Family History   Problem Relation Age of Onset    Cancer Father     Diabetes Son     Cancer  Paternal Aunt     Heart disease Paternal Aunt     Cancer Paternal Uncle       Allergies   Allergen Reactions    Baclofen Rash    Codeine Nausea Only    Ibuprofen Unknown (See Comments)     Patient doesn't know----Motin    Methocarbamol Unknown (See Comments)     Patient doesn't know    Naproxen Unknown (See Comments)     Pt. Doesn't know     Tizanidine Hcl Other (See Comments)     Syncope     Tolmetin Dizziness     Pt. Doesn't know-- same as Tolectin      Social Determinants of Health     Tobacco Use: Low Risk  (1/11/2024)    Patient History     Smoking Tobacco Use: Never     Smokeless Tobacco Use: Never     Passive Exposure: Not on file   Alcohol Use: Not At Risk (9/18/2023)    AUDIT-C     Frequency of Alcohol Consumption: Never     Average Number of Drinks: Patient does not drink     Frequency of Binge Drinking: Never   Financial Resource Strain: Not on file   Food Insecurity: Not on file   Transportation Needs: Unmet Transportation Needs (11/1/2023)    OASIS : Transportation     Lack of Transportation (Medical): Yes     Lack of Transportation (Non-Medical): No     Patient Unable or Declines to Respond: No   Physical Activity: Inactive (9/19/2023)    Exercise Vital Sign     Days of Exercise per Week: 0 days     Minutes of Exercise per Session: 0 min   Stress: Not on file   Social Connections: Feeling Socially Integrated (11/1/2023)    OASIS : Social Isolation     Frequency of experiencing loneliness or isolation: Never   Recent Concern: Social Connections - Feeling Socially Isolated (9/16/2023)    OASIS : Social Isolation     Frequency of experiencing loneliness or isolation: Often   Interpersonal Safety: Not At Risk (1/10/2024)    Abuse Screen     Unsafe at Home or Work/School: no     Feels Threatened by Someone?: no     Does Anyone Keep You from Contacting Others or Doint Things Outside the Home?: no     Physical Sign of Abuse Present: no   Depression: Not at risk (10/4/2022)    PHQ-2     PHQ-2  Score: 0   Housing Stability: Not At Risk (9/19/2023)    Housing Stability     Current Living Arrangements: home     Potentially Unsafe Housing Conditions: none   Utilities: Not on file   Health Literacy: Inadequate Health Literacy (11/1/2023)    OASIS : Health Literacy     Frequency of needing help to read materials from doctor or pharmacy: Sometimes   Employment: Unknown (10/7/2023)    Employment     Do you want help finding or keeping work or a job?: Not on file   Disabilities: Not At Risk (9/18/2023)    Disabilities     Concentrating, Remembering, or Making Decisions Difficulty: no     Doing Errands Independently Difficulty: no        Home Medications     Prior to Admission medications    Medication Sig Start Date End Date Taking? Authorizing Provider   acetaminophen (TYLENOL) 325 MG tablet Take 2 tablets by mouth Every 6 (Six) Hours As Needed for Mild Pain. Indications: Pain 1/1/23   Elton Flores MD   apixaban (ELIQUIS) 5 MG tablet tablet Take 1 tablet by mouth Every 12 (Twelve) Hours. Indications: Atrial Fibrillation 1/19/23   Zack Cifuentes MD   atorvastatin (LIPITOR) 10 MG tablet Take 1 tablet by mouth Daily.    Elton Flores MD   digoxin (LANOXIN) 125 MCG tablet Take 1 tablet by mouth Daily for 30 days. 9/21/23 10/21/23  Wagner Pan PA-C   loperamide (IMODIUM A-D) 2 MG tablet Take 1 tablet by mouth 4 (Four) Times a Day As Needed for Diarrhea. Indications: Diarrhea 8/29/22   Elton Flores MD   metoprolol succinate XL (TOPROL-XL) 25 MG 24 hr tablet Take 1 tablet by mouth Daily. 9/8/23   Heriberto Bradley MD   ondansetron (ZOFRAN) 4 MG tablet Take 1 tablet by mouth Every 8 (Eight) Hours As Needed for Nausea or Vomiting.    Elton Flores MD   rOPINIRole (REQUIP) 0.25 MG tablet Take 1 tablet by mouth Every Night. Take 1 hour before bedtime.  Indications: Restless Leg Syndrome 9/8/23   Heriberto Bradley MD   traZODone (DESYREL) 50 MG tablet Take 0.5 tablets by mouth  "Daily As Needed (Takes at night time as needed). Indications: Trouble Sleeping 9/6/23   Provider, MD Elton        Objective / Physical Exam     Vital signs:  Temp: 98.1 °F (36.7 °C)  BP: 131/75  Heart Rate: 87  Resp: 22  SpO2: 90 %  Weight: 74.3 kg (163 lb 12.8 oz)    Admission Weight: Weight: 74.3 kg (163 lb 12.8 oz)    Physical Exam  Vitals and nursing note reviewed.   Constitutional:       Appearance: Normal appearance.   HENT:      Head: Normocephalic.      Nose: Nose normal.      Mouth/Throat:      Mouth: Mucous membranes are moist.      Pharynx: Oropharynx is clear.   Eyes:      Pupils: Pupils are equal, round, and reactive to light.   Cardiovascular:      Rate and Rhythm: Tachycardia present. Rhythm irregular.      Pulses: Normal pulses.      Heart sounds: Normal heart sounds.   Pulmonary:      Effort: Pulmonary effort is normal.      Breath sounds: Normal breath sounds.   Abdominal:      General: Bowel sounds are normal.      Palpations: Abdomen is soft.   Musculoskeletal:         General: Normal range of motion.      Cervical back: Normal range of motion.   Skin:     General: Skin is warm and dry.      Capillary Refill: Capillary refill takes 2 to 3 seconds.   Neurological:      General: No focal deficit present.      Mental Status: She is alert and oriented to person, place, and time. Mental status is at baseline.   Psychiatric:         Mood and Affect: Mood normal.         Behavior: Behavior normal.         Thought Content: Thought content normal.         Judgment: Judgment normal.          Labs     Results from last 7 days   Lab Units 01/10/24  2254   WBC 10*3/mm3 7.40   HEMOGLOBIN g/dL 11.9*   HEMATOCRIT % 36.8   PLATELETS 10*3/mm3 272            Invalid input(s): \"BILI TOTAL\"        Results from last 7 days   Lab Units 01/10/24  2254   SODIUM mmol/L 141   POTASSIUM mmol/L 3.9   CHLORIDE mmol/L 104   CO2 mmol/L 25.0   BUN mg/dL 18   CREATININE mg/dL 0.79   GLUCOSE mg/dL 105*        Imaging     XR " Spine Lumbar 2 or 3 View    Result Date: 1/10/2024  XR SPINE LUMBAR 2 OR 3 VW Date of Exam: 1/10/2024 11:05 PM EST Indication: Low back pain Comparison: 9/3/2020. Findings: There are 5 typical lumbar spine vertebral bodies in anatomic alignment. There is no evidence of fracture or compression deformity.  No focal lesions identified. No pars defect. There is anterolisthesis of L3 on L4 measuring approximately 2 mm. Moderate multilevel degenerative changes are present throughout the spine, most pronounced at L4-L5 and L5-S1. Multilevel facet disease is present.     Impression: No acute osseous abnormality. Moderate multilevel degenerative changes are present throughout the spine, most pronounced at L4-L5 and L5-S1. Spondylolisthesis at L3-L4 is related to degenerative change and stable. Electronically Signed: Marjorie Moyer MD  1/10/2024 11:27 PM EST  Workstation ID: [id number]      EKG: My independent evaluation showed atrial fibrillation with PVCs, nonspecific T wave changes    Current Medications     Scheduled Meds:  apixaban, 5 mg, Oral, Q12H  pantoprazole, 40 mg, Intravenous, Q AM  senna-docusate sodium, 2 tablet, Oral, BID  sodium chloride, 10 mL, Intravenous, Q12H         Continuous Infusions:  dilTIAZem, 5-15 mg/hr, Last Rate: 5 mg/hr (01/11/24 0223)           KALYANI Bonilla   [facility]  01/11/24   02:51 EST

## 2024-01-11 NOTE — PLAN OF CARE
Goal Outcome Evaluation:  Plan of Care Reviewed With: patient           Outcome Evaluation: 82 y/o female came to hospital on 1/10 due to severe back pain , L worse than R side , no radiation and no report of bladder/bowel changes. Patient reports carrying firewood inside home 2 days prior to onset. PMH includes fibromyalgia, CKD, chronic back pain, pacemaker. Patient lives alone and normally able to perform household chores on her own. At time of eval, patient reporting mild pain at rest , increasing back pain with single knee to chest without radiculopathy. Log rolling to R side attempted but could not complete due to worsening back pain , mm spasms. Pt returned supine and ice pack applied. Patient cannot return home at this time and will need SNF for mobility training.      Anticipated Discharge Disposition (PT): skilled nursing facility

## 2024-01-11 NOTE — CASE MANAGEMENT/SOCIAL WORK
Discharge Planning Assessment  [facility]     Patient Name: Catalina Moreno  MRN: [id number]  Today's Date: 1/11/2024    Admit Date: 1/10/2024    Plan: Home, recently used [facility](following)    ADDENDUM: PT suggesting skilled rehab.Choice list given to patient, will need to check back and get choices. OT seeing patient now.   Discharge Needs Assessment       Row Name 01/11/24 0930       Living Environment    People in Home alone    Current Living Arrangements home    Potentially Unsafe Housing Conditions none    Primary Care Provided by self    Provides Primary Care For no one    Family Caregiver if Needed friend(s)    Family Caregiver Names Earline-friend, Jordan-son    Quality of Family Relationships helpful;involved;supportive    Able to Return to Prior Arrangements yes       Resource/Environmental Concerns    Resource/Environmental Concerns none    Transportation Concerns none       Transition Planning    Patient/Family Anticipates Transition to home       Discharge Needs Assessment    Readmission Within the Last 30 Days no previous admission in last 30 days    Equipment Currently Used at Home walker, rolling;cane, straight    Concerns to be Addressed discharge planning    Discharge Facility/Level of Care Needs home with home health  Recently used [facility]                   Discharge Plan       Row Name 01/11/24 0932       Plan    Plan Home, recently used [facility](following)    Plan Comments Spoke with patient at [facility],states lives alone, IADL's, confimred PCP and pharmacy. Denies transportaiton or medication coverage isues. Used [facility] recently. DC Barriers: Cardizem gtt.                  Continued Care and Services - Admitted Since 1/10/2024       Home Medical Care       Service Provider Request Status Selected Services Address Phone Fax Patient Preferred    [facility] Pending - Request Sent N/A [address] [facility] [place] [address] [phone] --                   Expected Discharge Date and Time       Expected Discharge Date Expected Discharge Time    Jan 12, 2024            Demographic Summary       Row Name 01/11/24 0928       General Information    Admission Type observation    Arrived From home    Required Notices Provided Observation Status Notice    Referral Source patient    Reason for Consult discharge planning    Preferred Language English                   Functional Status       Row Name 01/11/24 0930       Functional Status    Usual Activity Tolerance good    Current Activity Tolerance good       Functional Status, IADL    Medications independent    Meal Preparation independent    Housekeeping independent    Laundry independent    Shopping independent       Mental Status    General Appearance WDL WDL                   Patient Forms       Row Name 01/11/24 0929       Patient Forms    Patient Observation Letter Delivered  1/10 registration           Met with patient in room wearing PPE: mask, face shield/goggles, gloves, gown.      Maintained distance greater than six feet and spent less than 15 minutes in the room.     Nan Weir RN

## 2024-01-11 NOTE — THERAPY EVALUATION
Patient Name: Catalina Moreno  : 1940    MRN: [id number]                              Today's Date: 2024       Admit Date: 1/10/2024    Visit Dx:     ICD-10-CM ICD-9-CM   1. Atrial fibrillation with RVR  I48.91 427.31   2. Acute low back pain, unspecified back pain laterality, unspecified whether sciatica present  M54.50 724.2     Patient Active Problem List   Diagnosis    Arthritis    Depressive disorder    Primary fibromyalgia syndrome    Hypothyroidism    Atrial fibrillation with RVR    Stage 3a chronic kidney disease    Chronic low back pain    Age-related cognitive decline    Generalized anxiety disorder    Polyneuropathy, unspecified    Restless legs syndrome    Paroxysmal atrial fibrillation    Essential (primary) hypertension    Gastro-esophageal reflux disease without esophagitis    Pulmonary hypertension, unspecified    Coronary artery disease involving native coronary artery of native heart without angina pectoris    Presence of cardiac pacemaker    Chronic HFrEF (heart failure with reduced ejection fraction)    Atrial flutter with rapid ventricular response    Weakness     Past Medical History:   Diagnosis Date    Acquired spondylolisthesis of lumbosacral region     Acute kidney injury 2022    Anxiety     Arthritis     Atrial fibrillation     paroxysmal    Broken shoulder     left-- due to fall 19 was at [facility]    Chronic pain disorder     Chronic systolic CHF (congestive heart failure) 2023    EF 36-40%    Coronary artery disease involving native coronary artery of native heart without angina pectoris 2021    nonobstructive    DDD (degenerative disc disease), cervical     DDD (degenerative disc disease), lumbar     Depression     Edema     2020 foot    GERD (gastroesophageal reflux disease)     H/O fall     Heart failure with mid-range ejection fraction 2022    EF 45% per 2D echo    Hypertension     Hypothyroidism     Insomnia     Low back pain     Moderate  mitral regurgitation 01/05/2023    Neuropathy     Nonrheumatic tricuspid valve regurgitation     Pain in both feet     Polypharmacy     PONV (postoperative nausea and vomiting)     Pulmonary hypertension     Radiculopathy     Restless legs     Sacroiliitis     Sick sinus syndrome     Added automatically from request for surgery [id number]    Spondylolisthesis     Stage 3a chronic kidney disease     Urinary incontinence     Vitamin D deficiency      Past Surgical History:   Procedure Laterality Date    BACK SURGERY  07/19/2018    PDC &  PSF L3-L5 insitu    CARDIAC CATHETERIZATION N/A 4/2/2021    Procedure: Cardiac Catheterization/Vascular Study;  Surgeon: Barrett Evans MD;  Location: [facility] CATH INVASIVE LOCATION;  Service: Cardiovascular;  Laterality: N/A;    CARDIAC ELECTROPHYSIOLOGY PROCEDURE N/A 1/17/2023    Procedure: Pacemaker DC new;  Surgeon: Baljeet Valero MD;  Location: [facility] CATH INVASIVE LOCATION;  Service: Cardiovascular;  Laterality: N/A;    CHOLECYSTECTOMY      COLONOSCOPY      ENDOSCOPY N/A 9/14/2023    Procedure: ESOPHAGOGASTRODUODENOSCOPY;  Surgeon: Ulysses Lamas MD;  Location: [facility] ENDOSCOPY;  Service: Gastroenterology;  Laterality: N/A;  gastritis, inflammatory gastric polyp    HYSTERECTOMY      ROTATOR CUFF REPAIR Left       General Information       Row Name 01/11/24 1228          Physical Therapy Time and Intention    Document Type evaluation  -CR     Mode of Treatment physical therapy  -CR       Row Name 01/11/24 1228          General Information    Patient Profile Reviewed yes  -CR     Prior Level of Function independent:;all household mobility;ADL's;home management  -CR       Row Name 01/11/24 1228          Living Environment    People in Home alone  -CR       Row Name 01/11/24 1228          Stairs Within Home, Primary    Number of Stairs, Within Home, Primary none  -CR       Row Name 01/11/24 1228          Cognition    Orientation Status (Cognition) oriented x 4   -CR       Row Name 01/11/24 1228          Safety Issues, Functional Mobility    Impairments Affecting Function (Mobility) pain;range of motion (ROM);endurance/activity tolerance  -CR               User Key  (r) = Recorded By, (t) = Taken By, (c) = Cosigned By      Initials Name Provider Type    CR Reyes, Carmela, DEBBI Physical Therapist                   Mobility       Row Name 01/11/24 1229          Bed Mobility    Bed Mobility rolling right  -CR     Rolling Right [place] (Bed Mobility) maximum assist (25% patient effort);verbal cues  -CR     Assistive Device (Bed Mobility) draw sheet  -CR     Comment, (Bed Mobility) log roll technique  -CR               User Key  (r) = Recorded By, (t) = Taken By, (c) = Cosigned By      Initials Name Provider Type    CR Reyes, Carmela, DEBBI Physical Therapist                   Obj/Interventions       Row Name 01/11/24 1230          Range of Motion Comprehensive    Comment, General Range of Motion AROM BLE mod limit due to back pain  -CR       Row Name 01/11/24 1230          Strength Comprehensive (MMT)    Comment, General Manual Muscle Testing (MMT) Assessment BLE grossly 3-/5  -CR       Row Name 01/11/24 1230          Balance    Comment, Balance could not get patient into sitting position due to severe back pain  -CR       Row Name 01/11/24 1230          Sensory Assessment (Somatosensory)    Sensory Assessment (Somatosensory) --  neuropathy both feet prior to admit  -CR               User Key  (r) = Recorded By, (t) = Taken By, (c) = Cosigned By      Initials Name Provider Type    CR Reyes, Carmela, PT Physical Therapist                   Goals/Plan       Row Name 01/11/24 1236          Bed Mobility Goal 1 (PT)    Activity/Assistive Device (Bed Mobility Goal 1, PT) sit to supine/supine to sit  -CR     [place] Level/Cues Needed (Bed Mobility Goal 1, PT) standby assist  -CR     Time Frame (Bed Mobility Goal 1, PT) long term goal (LTG);1 week  -CR       Row Name 01/11/24 1236           Transfer Goal 1 (PT)    Activity/Assistive Device (Transfer Goal 1, PT) transfers, all;walker, rolling  -CR     Assumption Level/Cues Needed (Transfer Goal 1, PT) contact guard required  -CR     Time Frame (Transfer Goal 1, PT) long term goal (LTG);2 weeks  -CR       Row Name 01/11/24 1236          Gait Training Goal 1 (PT)    Activity/Assistive Device (Gait Training Goal 1, PT) gait (walking locomotion);assistive device use;diminish gait deviation;increase endurance/gait distance;walker, rolling  -CR     Assumption Level (Gait Training Goal 1, PT) contact guard required  -CR     Distance (Gait Training Goal 1, PT) 40 ft x 2  -CR     Time Frame (Gait Training Goal 1, PT) long term goal (LTG);2 weeks  -CR       Row Name 01/11/24 1236          Therapy Assessment/Plan (PT)    Planned Therapy Interventions (PT) bed mobility training;gait training;home exercise program;patient/family education;postural re-education;transfer training  -CR               User Key  (r) = Recorded By, (t) = Taken By, (c) = Cosigned By      Initials Name Provider Type    CR Reyes, Carmela, PT Physical Therapist                   Clinical Impression       Row Name 01/11/24 1231          Pain    Pretreatment Pain Rating 2/10  -CR     Posttreatment Pain Rating 10/10  -CR     Pain Location - back  -CR     Pain Intervention(s) Cold applied  -CR       Row Name 01/11/24 1231          Plan of Care Review    Plan of Care Reviewed With patient  -CR     Outcome Evaluation 84 y/o female came to hospital on 1/10 due to severe back pain , L worse than R side , no radiation and no report of bladder/bowel changes. Patient reports carrying firewood inside home 2 days prior to onset. PMH includes fibromyalgia, CKD, chronic back pain, pacemaker. Patient lives alone and normally able to perform household chores on her own. At time of eval, patient reporting mild pain at rest , increasing back pain with single knee to chest without radiculopathy. Log  rolling to R side attempted but could not complete due to worsening back pain , mm spasms. Pt returned supine and ice pack applied. Patient cannot return home at this time and will need SNF for mobility training.  -CR       Row Name 01/11/24 1231          Therapy Assessment/Plan (PT)    Patient/Family Therapy Goals Statement (PT) home  -CR     Rehab Potential (PT) good, to achieve stated therapy goals  -CR     Criteria for Skilled Interventions Met (PT) yes;skilled treatment is necessary  -CR     Therapy Frequency (PT) 5 times/wk  -CR     Predicted Duration of Therapy Intervention (PT) dc  -CR               User Key  (r) = Recorded By, (t) = Taken By, (c) = Cosigned By      Initials Name Provider Type    CR Reyes, Carmela, PT Physical Therapist                   Outcome Measures       Row Name 01/11/24 1237 01/11/24 0716       How much help from another person do you currently need...    Turning from your back to your side while in flat bed without using bedrails? 3  -CR 3  -KB    Moving from lying on back to sitting on the side of a flat bed without bedrails? 2  -CR 3  -KB    Moving to and from a bed to a chair (including a wheelchair)? 2  -CR 3  -KB    Standing up from a chair using your arms (e.g., wheelchair, bedside chair)? 2  -CR 3  -KB    Climbing 3-5 steps with a railing? 1  -CR 2  -KB    To walk in hospital room? 1  -CR 3  -KB    AM-PAC 6 Clicks Score (PT) 11  -CR 17  -KB    Highest Level of Mobility Goal 4 --> Transfer to chair/commode  -CR 5 --> Static standing  -KB              User Key  (r) = Recorded By, (t) = Taken By, (c) = Cosigned By      Initials Name Provider Type    CR Reyes, Carmela, PT Physical Therapist    Holli Rain, RN Registered Nurse                                 Physical Therapy Education       Title: PT OT SLP Therapies (In Progress)       Topic: Physical Therapy (In Progress)       Point: Mobility training (In Progress)       Learning Progress Summary             Patient  Acceptance, E, NR by CR at 1/11/2024 2932                         Point: Home exercise program (Not Started)       Learner Progress:  Not documented in this visit.              Point: Body mechanics (Not Started)       Learner Progress:  Not documented in this visit.              Point: Precautions (Not Started)       Learner Progress:  Not documented in this visit.                              User Key       Initials Effective Dates Name Provider Type Discipline    CR 06/16/21 -  Reyes, Carmela, PT Physical Therapist PT                  PT Recommendation and Plan  Planned Therapy Interventions (PT): bed mobility training, gait training, home exercise program, patient/family education, postural re-education, transfer training  Plan of Care Reviewed With: patient  Outcome Evaluation: 82 y/o female came to hospital on 1/10 due to severe back pain , L worse than R side , no radiation and no report of bladder/bowel changes. Patient reports carrying firewood inside home 2 days prior to onset. PMH includes fibromyalgia, CKD, chronic back pain, pacemaker. Patient lives alone and normally able to perform household chores on her own. At time of eval, patient reporting mild pain at rest , increasing back pain with single knee to chest without radiculopathy. Log rolling to R side attempted but could not complete due to worsening back pain , mm spasms. Pt returned supine and ice pack applied. Patient cannot return home at this time and will need SNF for mobility training.     Time Calculation:         PT Charges       Row Name 01/11/24 0335             Time Calculation    Start Time 1112  -CR      Stop Time 1132  -CR      Time Calculation (min) 20 min  -CR      PT Received On 01/11/24  -CR      PT - Next Appointment 01/12/24  -CR      PT Goal Re-Cert Due Date 01/25/24  -CR         Time Calculation- PT    Total Timed Code Minutes- PT 0 minute(s)  -CR                User Key  (r) = Recorded By, (t) = Taken By, (c) = Cosigned By       Initials Name Provider Type    CR Reyes, Carmela, PT Physical Therapist                  Therapy Charges for Today       Code Description Service Date Service Provider Modifiers Qty    [id number] HC PT EVAL LOW COMPLEXITY 4 1/11/2024 Reyes, Carmela, PT GP 1            PT G-Codes  AM-PAC 6 Clicks Score (PT): 11  PT Discharge Summary  Anticipated Discharge Disposition (PT): skilled nursing facility    Carmela Reyes, PT  1/11/2024

## 2024-01-11 NOTE — ED NOTES
This tech answered the patients call light. The patients was scratching her left arm, and had removed the coban and 2x2 gauze covering her previous straight stick site, as well as removed her blood pressure cuff. The patient complained that the blood pressure cuff had caused her arm to start itching, and needed a break from it. This tech applied a band aid to the previous straight stick site, and alerted the nurse that the blood pressure cuff had been removed and would need to be replaced when the next blood pressure was needed. Pt call light in reach, the only request was of pain medicine for her restless legs, which the nurse had been informed of. Lights were turned off per patient request for comfort

## 2024-01-11 NOTE — ED PROVIDER NOTES
Subjective   History of Present Illness  Patient is an 83-year-old female complaining of low back pain for the past 1 to 2 days.  She denies recent trauma numbness tingling bowel or bladder abnormality.      Review of Systems    Past Medical History:   Diagnosis Date    Acute kidney injury 07/22/2022    Anxiety     Arthritis     Atrial fibrillation     paroxysmal    Broken shoulder     left-- due to fall 11-7-19 was at [facility]    Chronic systolic CHF (congestive heart failure) 01/05/2023    EF 36-40%    Coronary artery disease involving native coronary artery of native heart without angina pectoris 04/02/2021    nonobstructive    DDD (degenerative disc disease), lumbar     Depression     Edema     9/2020 foot    GERD (gastroesophageal reflux disease)     H/O fall     Heart failure with mid-range ejection fraction 03/05/2022    EF 45% per 2D echo    Hypertension     Hypothyroidism     Insomnia     Low back pain     Moderate mitral regurgitation 1/5/2023    Neuropathy     Pain in both feet     PONV (postoperative nausea and vomiting)     Pulmonary hypertension     Radiculopathy     Restless legs     Spondylolisthesis     Stage 3a chronic kidney disease     Urinary incontinence        Allergies   Allergen Reactions    Baclofen Rash    Codeine Nausea Only    Ibuprofen Unknown (See Comments)     Patient doesn't know----Motin    Methocarbamol Unknown (See Comments)     Patient doesn't know    Naproxen Unknown (See Comments)     Pt. Doesn't know     Tizanidine Hcl Other (See Comments)     Syncope     Tolmetin Dizziness     Pt. Doesn't know-- same as Tolectin       Past Surgical History:   Procedure Laterality Date    BACK SURGERY  07/19/2018    PDC &  PSF L3-L5 insitu    CARDIAC CATHETERIZATION N/A 4/2/2021    Procedure: Cardiac Catheterization/Vascular Study;  Surgeon: Barrett Evans MD;  Location: [facility] CATH INVASIVE LOCATION;  Service: Cardiovascular;  Laterality: N/A;    CARDIAC ELECTROPHYSIOLOGY PROCEDURE  N/A 1/17/2023    Procedure: Pacemaker DC new;  Surgeon: Baljeet Valero MD;  Location: [facility] CATH INVASIVE LOCATION;  Service: Cardiovascular;  Laterality: N/A;    CHOLECYSTECTOMY      COLONOSCOPY      ENDOSCOPY N/A 9/14/2023    Procedure: ESOPHAGOGASTRODUODENOSCOPY;  Surgeon: Ulysses Lamas MD;  Location: [facility] ENDOSCOPY;  Service: Gastroenterology;  Laterality: N/A;  gastritis, inflammatory gastric polyp    HYSTERECTOMY      ROTATOR CUFF REPAIR Left        Family History   Problem Relation Age of Onset    Cancer Father     Diabetes Son     Cancer Paternal Aunt     Heart disease Paternal Aunt     Cancer Paternal Uncle        Social History     Socioeconomic History    Marital status:    Tobacco Use    Smoking status: Never    Smokeless tobacco: Never   Vaping Use    Vaping Use: Never used   Substance and Sexual Activity    Alcohol use: No    Drug use: Never    Sexual activity: Defer           Objective   Physical Exam  Neck has no adenopathy JVD or bruits.  Lungs are clear.  Heart has an irregular rate and rhythm is tachycardic without murmur rub or gallop.  Chest is nontender.  Ab soft nontender.  Back has diffuse low back pain on palpation.  Extremities M is unremarkable.  Neurologic exam is nonfocal.  Procedures     My EKG interpretation shows atrial fibrillation with RVR at a rate of 140 with no acute ST change      ED Course      Results for orders placed or performed during the hospital encounter of 01/10/24   Basic Metabolic Panel    Specimen: Blood   Result Value Ref Range    Glucose 105 (H) 65 - 99 mg/dL    BUN 18 8 - 23 mg/dL    Creatinine 0.79 0.57 - 1.00 mg/dL    Sodium 141 136 - 145 mmol/L    Potassium 3.9 3.5 - 5.2 mmol/L    Chloride 104 98 - 107 mmol/L    CO2 25.0 22.0 - 29.0 mmol/L    Calcium 9.1 8.6 - 10.5 mg/dL    BUN/Creatinine Ratio 22.8 7.0 - 25.0    Anion Gap 12.0 5.0 - 15.0 mmol/L    eGFR 74.8 >60.0 mL/min/1.73   Urinalysis With Microscopic If Indicated (No Culture) -  Straight Cath    Specimen: Straight Cath; Urine   Result Value Ref Range    Color, UA Yellow Yellow, Straw    Appearance, UA Clear Clear    pH, UA 7.5 5.0 - 8.0    Specific Gravity, UA 1.014 1.005 - 1.030    Glucose, UA Negative Negative    Ketones, UA Negative Negative    Bilirubin, UA Negative Negative    Blood, UA Negative Negative    Protein, UA Negative Negative    Leuk Esterase, UA Negative Negative    Nitrite, UA Negative Negative    Urobilinogen, UA 0.2 E.U./dL 0.2 - 1.0 E.U./dL   Digoxin Level    Specimen: Blood   Result Value Ref Range    Digoxin <0.30 (L) 0.60 - 1.20 ng/mL   CBC Auto Differential    Specimen: Blood   Result Value Ref Range    WBC 7.40 3.40 - 10.80 10*3/mm3    RBC 4.22 3.77 - 5.28 10*6/mm3    Hemoglobin 11.9 (L) 12.0 - 15.9 g/dL    Hematocrit 36.8 34.0 - 46.6 %    MCV 87.4 79.0 - 97.0 fL    MCH 28.2 26.6 - 33.0 pg    MCHC 32.3 31.5 - 35.7 g/dL    RDW 16.1 (H) 12.3 - 15.4 %    RDW-SD 49.4 37.0 - 54.0 fl    MPV 8.1 6.0 - 12.0 fL    Platelets 272 140 - 450 10*3/mm3    Neutrophil % 64.5 42.7 - 76.0 %    Lymphocyte % 26.4 19.6 - 45.3 %    Monocyte % 7.0 5.0 - 12.0 %    Eosinophil % 1.8 0.3 - 6.2 %    Basophil % 0.3 0.0 - 1.5 %    Neutrophils, Absolute 4.80 1.70 - 7.00 10*3/mm3    Lymphocytes, Absolute 1.90 0.70 - 3.10 10*3/mm3    Monocytes, Absolute 0.50 0.10 - 0.90 10*3/mm3    Eosinophils, Absolute 0.10 0.00 - 0.40 10*3/mm3    Basophils, Absolute 0.00 0.00 - 0.20 10*3/mm3    nRBC 0.0 0.0 - 0.2 /100 WBC   ECG 12 Lead Tachycardia   Result Value Ref Range    QT Interval 324 ms    QTC Interval 496 ms   Gold Top - SST   Result Value Ref Range    Extra Tube Hold for add-ons.    Light Blue Top   Result Value Ref Range    Extra Tube Hold for add-ons.      XR Spine Lumbar 2 or 3 View    Result Date: 1/10/2024  Impression: No acute osseous abnormality. Moderate multilevel degenerative changes are present throughout the spine, most pronounced at L4-L5 and L5-S1. Spondylolisthesis at L3-L4 is related to  degenerative change and stable. Electronically Signed: Marjorie Moyer MD  1/10/2024 11:27 PM EST  Workstation ID: [id number]                                          Medical Decision Making  My interpretation of the patient's x-ray lumbar spine shows degenerative change without fracture or dislocation.  Patient has no leukocytosis no left shift and no anemia.  Patient digoxin level 0.3.  UA is normal.  Metabolic panel shows no renal insufficiency or electrolyte abnormality.  Patient was given morphine IV for pain control.  She was given digoxin total of 0.5 mg IV as well as Lopressor 5 mg IV without change in her rate.  Patient was started on Cardizem for rate control.  Patient will be admitted to the hospital for further cardiac evaluation and rate control.  Total critical care time 45 minutes.  I did speak the on-call hospitalist.    Amount and/or Complexity of Data Reviewed  Labs: ordered. Decision-making details documented in ED Course.  Radiology: ordered and independent interpretation performed.  ECG/medicine tests: ordered and independent interpretation performed.    Risk  Prescription drug management.  Parenteral controlled substances.  Drug therapy requiring intensive monitoring for toxicity.  Decision regarding hospitalization.        Final diagnoses:   Atrial fibrillation with RVR   Acute low back pain, unspecified back pain laterality, unspecified whether sciatica present       ED Disposition  ED Disposition       ED Disposition   Decision to Admit    Condition   --    Comment   --               No follow-up provider specified.       Medication List      No changes were made to your prescriptions during this visit.            Quan Reagan MD  01/11/24 0116

## 2024-01-11 NOTE — THERAPY EVALUATION
Patient Name: Catalina Moreno  : 1940    MRN: [id number]                              Today's Date: 2024       Admit Date: 1/10/2024    Visit Dx:     ICD-10-CM ICD-9-CM   1. Atrial fibrillation with RVR  I48.91 427.31   2. Acute low back pain, unspecified back pain laterality, unspecified whether sciatica present  M54.50 724.2     Patient Active Problem List   Diagnosis    Arthritis    Depressive disorder    Primary fibromyalgia syndrome    Hypothyroidism    Atrial fibrillation with RVR    Stage 3a chronic kidney disease    Chronic low back pain    Age-related cognitive decline    Generalized anxiety disorder    Polyneuropathy, unspecified    Restless legs syndrome    Paroxysmal atrial fibrillation    Essential (primary) hypertension    Gastro-esophageal reflux disease without esophagitis    Pulmonary hypertension, unspecified    Coronary artery disease involving native coronary artery of native heart without angina pectoris    Presence of cardiac pacemaker    Chronic HFrEF (heart failure with reduced ejection fraction)    Atrial flutter with rapid ventricular response    Weakness    AF (atrial fibrillation)     Past Medical History:   Diagnosis Date    Acquired spondylolisthesis of lumbosacral region     Acute kidney injury 2022    Anxiety     Arthritis     Atrial fibrillation     paroxysmal    Broken shoulder     left-- due to fall 20 was at [facility]    Chronic pain disorder     Chronic systolic CHF (congestive heart failure) 2023    EF 36-40%    Coronary artery disease involving native coronary artery of native heart without angina pectoris 2021    nonobstructive    DDD (degenerative disc disease), cervical     DDD (degenerative disc disease), lumbar     Depression     Edema     2020 foot    GERD (gastroesophageal reflux disease)     H/O fall     Heart failure with mid-range ejection fraction 2022    EF 45% per 2D echo    Hypertension     Hypothyroidism     Insomnia      Low back pain     Moderate mitral regurgitation 01/05/2023    Neuropathy     Nonrheumatic tricuspid valve regurgitation     Pain in both feet     Polypharmacy     PONV (postoperative nausea and vomiting)     Pulmonary hypertension     Radiculopathy     Restless legs     Sacroiliitis     Sick sinus syndrome     Added automatically from request for surgery [id number]    Spondylolisthesis     Stage 3a chronic kidney disease     Urinary incontinence     Vitamin D deficiency      Past Surgical History:   Procedure Laterality Date    BACK SURGERY  07/19/2018    PDC &  PSF L3-L5 insitu    CARDIAC CATHETERIZATION N/A 4/2/2021    Procedure: Cardiac Catheterization/Vascular Study;  Surgeon: Barrett Evans MD;  Location: [facility] CATH INVASIVE LOCATION;  Service: Cardiovascular;  Laterality: N/A;    CARDIAC ELECTROPHYSIOLOGY PROCEDURE N/A 1/17/2023    Procedure: Pacemaker DC new;  Surgeon: Baljeet Valero MD;  Location: [facility] CATH INVASIVE LOCATION;  Service: Cardiovascular;  Laterality: N/A;    CHOLECYSTECTOMY      COLONOSCOPY      ENDOSCOPY N/A 9/14/2023    Procedure: ESOPHAGOGASTRODUODENOSCOPY;  Surgeon: Ulysses Lamas MD;  Location: [facility] ENDOSCOPY;  Service: Gastroenterology;  Laterality: N/A;  gastritis, inflammatory gastric polyp    HYSTERECTOMY      ROTATOR CUFF REPAIR Left       General Information       Row Name 01/11/24 1411          OT Time and Intention    Document Type evaluation  -DT     Mode of Treatment occupational therapy  -DT       Row Name 01/11/24 1411          General Information    Patient Profile Reviewed yes  -DT     Prior Level of Function independent:;all household mobility;ADL's;home management  Uses RW or cane during amb depending on how she is feeling for the day. Has a tub seat. States she has had inc difficulty getting in/out tub.  -DT       Row Name 01/11/24 1411          Living Environment    People in Home alone  -DT       Row Name 01/11/24 1411          Home Main  Entrance    Number of Stairs, Main Entrance none  -DT       Row Name 01/11/24 1411          Stairs Within Home, Primary    Number of Stairs, Within Home, Primary none  -DT       Row Name 01/11/24 1411          Cognition    Orientation Status (Cognition) oriented x 4  -DT       Row Name 01/11/24 1411          Safety Issues, Functional Mobility    Impairments Affecting Function (Mobility) pain;endurance/activity tolerance  -DT               User Key  (r) = Recorded By, (t) = Taken By, (c) = Cosigned By      Initials Name Provider Type    More De Jesus, OT Occupational Therapist                     Mobility/ADL's       Row Name 01/11/24 1413          Bed Mobility    Bed Mobility rolling right  -DT     Rolling Right [place] (Bed Mobility) maximum assist (25% patient effort);verbal cues  -DT     Assistive Device (Bed Mobility) draw sheet  -DT       Row Name 01/11/24 1413          Activities of Daily Living    BADL Assessment/Intervention --  Pt requiring assist for LB ADLs, toileting due to back pain.  -DT               User Key  (r) = Recorded By, (t) = Taken By, (c) = Cosigned By      Initials Name Provider Type    More De Jesus, OT Occupational Therapist                   Obj/Interventions       Row Name 01/11/24 1414          Sensory Assessment (Somatosensory)    Sensory Assessment Pt reports bilateral feet neuropathy, but this is not new onset.  -DT       Row Name 01/11/24 1414          Range of Motion Comprehensive    General Range of Motion bilateral upper extremity ROM WNL  -DT       Row Name 01/11/24 1414          Strength Comprehensive (MMT)    General Manual Muscle Testing (MMT) Assessment upper extremity strength deficits identified  -DT     Comment, General Manual Muscle Testing (MMT) Assessment BUE= 4/5  -DT       Row Name 01/11/24 1414          Balance    Comment, Balance unable to assess as pt c/o inc pain & unable to tolerate transition to sitting EOB.  -DT                User Key  (r) = Recorded By, (t) = Taken By, (c) = Cosigned By      Initials Name Provider Type    DT More Moreno, OT Occupational Therapist                   Goals/Plan       Row Name 01/11/24 1431          Transfer Goal 1 (OT)    Activity/Assistive Device (Transfer Goal 1, OT) bed-to-chair/chair-to-bed;toilet  -DT     [place] Level/Cues Needed (Transfer Goal 1, OT) modified independence  -DT     Time Frame (Transfer Goal 1, OT) 2 weeks  -DT       Row Name 01/11/24 1431          Bathing Goal 1 (OT)    Activity/Device (Bathing Goal 1, OT) bathing skills, all  -DT     [place] Level/Cues Needed (Bathing Goal 1, OT) modified independence  -DT     Time Frame (Bathing Goal 1, OT) 2 weeks  -DT       Row Name 01/11/24 1431          Dressing Goal 1 (OT)    Activity/Device (Dressing Goal 1, OT) dressing skills, all  -DT     [place]/Cues Needed (Dressing Goal 1, OT) modified independence  -DT     Time Frame (Dressing Goal 1, OT) 2 weeks  -DT       Row Name 01/11/24 1431          Toileting Goal 1 (OT)    Activity/Device (Toileting Goal 1, OT) toileting skills, all  -DT     [place] Level/Cues Needed (Toileting Goal 1, OT) modified independence  -DT     Time Frame (Toileting Goal 1, OT) 2 weeks  -DT       [facility] Name 01/11/24 1431          Therapy Assessment/Plan (OT)    Planned Therapy Interventions (OT) activity tolerance training;adaptive equipment training;BADL retraining;functional balance retraining;neuromuscular control/coordination retraining;occupation/activity based interventions;patient/caregiver education/training;ROM/therapeutic exercise;strengthening exercise;transfer/mobility retraining  -DT               User Key  (r) = Recorded By, (t) = Taken By, (c) = Cosigned By      Initials Name Provider Type    DT More Moreno, OT Occupational Therapist                   Clinical Impression       Row Name 01/11/24 1416          Pain Assessment    Pretreatment Pain Rating 9/10  -DT   "   Posttreatment Pain Rating 9/10  -DT     Pain Location - back  -DT     Pre/Posttreatment Pain Comment reports pain in lower back, radiating down both LEs at this time (L>R). Decribes it as \"lightening\" striking her.  -DT     Pain Intervention(s) Medication (See MAR);Nursing Notified;Rest;Therapeutic presence;Emotional support;Repositioned  -DT       Row Name 01/11/24 1416          Plan of Care Review    Plan of Care Reviewed With patient  -DT     Outcome Evaluation Pt is an 83 y.o. F adm to [facility] on 01/10/24 with c/o severe lower back pain (L>R) that she describes as \"lightening striking\" her. She does report some radiation down both LE at times & says she also has muscle spasms. Pt also found to be in afib with RVR. PMH: fibromyalgia, CKD, CBP, pacemaker. At baseline, she lives alone in [facility] with 0 VLADIMIR. She is ind with BADLs & uses either a cane or RW when ambulating depending on how she is feeling at the time. She also has a tub seat & reports she has been having inc difficulty getting in/out of tub & is fearful of falling. Upon eval, pt is A&O X4. She has WFL UB strength & AROM. She only tolerated log rolling to the right. Pt did not tolerate an attempt to sit EOB, c/o back pain & expressing fear of pain just with moving legs to EOB in sidelying position. At this time, pt would require significant assist for mobilty, dressing, bathing & toileting skills due to pain & would be unable to return home alone safely. OT will continue to follow pt while in acute care & recommends SNF upon discharge.  -DT       Row Name 01/11/24 1416          Therapy Assessment/Plan (OT)    Rehab Potential (OT) good, to achieve stated therapy goals  -DT     Criteria for Skilled Therapeutic Interventions Met (OT) yes;meets criteria;skilled treatment is necessary  -DT     Therapy Frequency (OT) 3 times/wk  -DT     Predicted Duration of Therapy Intervention (OT) until d/c  -DT       Row Name 01/11/24 1416          Therapy Plan " Review/Discharge Plan (OT)    Anticipated Discharge Disposition (OT) skilled nursing facility  -DT       Row Name 01/11/24 1416          Vital Signs    Pre SpO2 (%) 99  -DT     O2 Delivery Pre Treatment supplemental O2  2L  -DT       Row Name 01/11/24 1416          Positioning and Restraints    Pre-Treatment Position in bed  -DT     Post Treatment Position bed  -DT     In Bed notified nsg;supine;call light within reach;encouraged to call for assist;side rails up x2  -DT               User Key  (r) = Recorded By, (t) = Taken By, (c) = Cosigned By      Initials Name Provider Type    DT More Moreno, OT Occupational Therapist                   Outcome Measures       Row Name 01/11/24 1237 01/11/24 0716       How much help from another person do you currently need...    Turning from your back to your side while in flat bed without using bedrails? 3  -CR 3  -KB    Moving from lying on back to sitting on the side of a flat bed without bedrails? 2  -CR 3  -KB    Moving to and from a bed to a chair (including a wheelchair)? 2  -CR 3  -KB    Standing up from a chair using your arms (e.g., wheelchair, bedside chair)? 2  -CR 3  -KB    Climbing 3-5 steps with a railing? 1  -CR 2  -KB    To walk in hospital room? 1  -CR 3  -KB    AM-PAC 6 Clicks Score (PT) 11  -CR 17  -KB    Highest Level of Mobility Goal 4 --> Transfer to chair/commode  -CR 5 --> Static standing  -KB              User Key  (r) = Recorded By, (t) = Taken By, (c) = Cosigned By      Initials Name Provider Type    CR Reyes, Carmela, PT Physical Therapist    Holli Rain, RN Registered Nurse                    Occupational Therapy Education       Title: PT OT SLP Therapies (In Progress)       Topic: Occupational Therapy (In Progress)       Point: ADL training (Done)       Description:   Instruct learner(s) on proper safety adaptation and remediation techniques during self care or transfers.   Instruct in proper use of assistive devices.           "        Learning Progress Summary             Patient Acceptance, E,TB, VU,NR by DT at 1/11/2024 1433    Comment: Role of OT,goals, back safety prec,                         Point: Home exercise program (Not Started)       Description:   Instruct learner(s) on appropriate technique for monitoring, assisting and/or progressing therapeutic exercises/activities.                  Learner Progress:  Not documented in this visit.              Point: Precautions (Done)       Description:   Instruct learner(s) on prescribed precautions during self-care and functional transfers.                  Learning Progress Summary             Patient Acceptance, E,TB, VU,NR by DT at 1/11/2024 1433    Comment: Role of OT,goals, back safety prec,                         Point: Body mechanics (Done)       Description:   Instruct learner(s) on proper positioning and spine alignment during self-care, functional mobility activities and/or exercises.                  Learning Progress Summary             Patient Acceptance, E,TB, VU,NR by DT at 1/11/2024 1433    Comment: Role of OT,goals, back safety prec,                                         User Key       Initials Effective Dates Name Provider Type Discipline    DT 07/11/23 -  More Moreno, OT Occupational Therapist OT                  OT Recommendation and Plan  Planned Therapy Interventions (OT): activity tolerance training, adaptive equipment training, BADL retraining, functional balance retraining, neuromuscular control/coordination retraining, occupation/activity based interventions, patient/caregiver education/training, ROM/therapeutic exercise, strengthening exercise, transfer/mobility retraining  Therapy Frequency (OT): 3 times/wk  Plan of Care Review  Plan of Care Reviewed With: patient  Outcome Evaluation: Pt is an 83 y.o. F adm to [facility] on 01/10/24 with c/o severe lower back pain (L>R) that she describes as \"lightening striking\" her. She does report some radiation " down both LE at times & says she also has muscle spasms. Pt also found to be in afib with RVR. PMH: fibromyalgia, CKD, CBP, pacemaker. At baseline, she lives alone in [facility] with 0 VLADIMIR. She is ind with BADLs & uses either a cane or RW when ambulating depending on how she is feeling at the time. She also has a tub seat & reports she has been having inc difficulty getting in/out of tub & is fearful of falling. Upon eval, pt is A&O X4. She has WFL UB strength & AROM. She only tolerated log rolling to the right. Pt did not tolerate an attempt to sit EOB, c/o back pain & expressing fear of pain just with moving legs to EOB in sidelying position. At this time, pt would require significant assist for mobilty, dressing, bathing & toileting skills due to pain & would be unable to return home alone safely. OT will continue to follow pt while in acute care & recommends SNF upon discharge.     Time Calculation:         Time Calculation- OT       Row Name 01/11/24 1433             Time Calculation- OT    OT Start Time 1351  -DT      OT Stop Time 1413  -DT      OT Time Calculation (min) 22 min  -DT      OT Received On 01/11/24  -DT      OT - Next Appointment 01/12/24  -DT      OT Goal Re-Cert Due Date 01/25/24  -DT                User Key  (r) = Recorded By, (t) = Taken By, (c) = Cosigned By      Initials Name Provider Type    More De Jesus, OT Occupational Therapist                           More Moreno, OT  1/11/2024

## 2024-01-11 NOTE — PLAN OF CARE
"Goal Outcome Evaluation:  Plan of Care Reviewed With: patient           Outcome Evaluation: Pt is an 83 y.o. F adm to [facility] on 01/10/24 with c/o severe lower back pain (L>R) that she describes as \"lightening striking\" her. She does report some radiation down both LE at times & says she also has muscle spasms. Pt also found to be in afib with RVR. PMH: fibromyalgia, CKD, CBP, pacemaker. At baseline, she lives alone in [facility] with 0 VLADIMIR. She is ind with BADLs & uses either a cane or RW when ambulating depending on how she is feeling at the time. She also has a tub seat & reports she has been having inc difficulty getting in/out of tub & is fearful of falling. Upon eval, pt is A&O X4. She has WFL UB strength & AROM. She only tolerated log rolling to the right. Pt did not tolerate an attempt to sit EOB, c/o back pain & expressing fear of pain just with moving legs to EOB in sidelying position. At this time, pt would require significant assist for mobilty, dressing, bathing & toileting skills due to pain & would be unable to return home alone safely. OT will continue to follow pt while in acute care & recommends SNF upon discharge.      Anticipated Discharge Disposition (OT): skilled nursing facility                        "

## 2024-01-12 ENCOUNTER — APPOINTMENT (OUTPATIENT)
Dept: MRI IMAGING | Facility: HOSPITAL | Age: 84
End: 2024-01-12
Payer: MEDICARE

## 2024-01-12 LAB
ANION GAP SERPL CALCULATED.3IONS-SCNC: 8 MMOL/L (ref 5–15)
BASOPHILS # BLD AUTO: 0 10*3/MM3 (ref 0–0.2)
BASOPHILS NFR BLD AUTO: 0.1 % (ref 0–1.5)
BUN SERPL-MCNC: 22 MG/DL (ref 8–23)
BUN/CREAT SERPL: 26.5 (ref 7–25)
CALCIUM SPEC-SCNC: 8.8 MG/DL (ref 8.6–10.5)
CHLORIDE SERPL-SCNC: 105 MMOL/L (ref 98–107)
CO2 SERPL-SCNC: 27 MMOL/L (ref 22–29)
CREAT SERPL-MCNC: 0.83 MG/DL (ref 0.57–1)
DEPRECATED RDW RBC AUTO: 49.4 FL (ref 37–54)
EGFRCR SERPLBLD CKD-EPI 2021: 70.5 ML/MIN/1.73
EOSINOPHIL # BLD AUTO: 0.2 10*3/MM3 (ref 0–0.4)
EOSINOPHIL NFR BLD AUTO: 3.5 % (ref 0.3–6.2)
ERYTHROCYTE [DISTWIDTH] IN BLOOD BY AUTOMATED COUNT: 16 % (ref 12.3–15.4)
GLUCOSE SERPL-MCNC: 126 MG/DL (ref 65–99)
HCT VFR BLD AUTO: 35.4 % (ref 34–46.6)
HGB BLD-MCNC: 11.3 G/DL (ref 12–15.9)
LYMPHOCYTES # BLD AUTO: 1.5 10*3/MM3 (ref 0.7–3.1)
LYMPHOCYTES NFR BLD AUTO: 27 % (ref 19.6–45.3)
MAGNESIUM SERPL-MCNC: 2.3 MG/DL (ref 1.6–2.4)
MCH RBC QN AUTO: 28.4 PG (ref 26.6–33)
MCHC RBC AUTO-ENTMCNC: 32.1 G/DL (ref 31.5–35.7)
MCV RBC AUTO: 88.5 FL (ref 79–97)
MONOCYTES # BLD AUTO: 0.5 10*3/MM3 (ref 0.1–0.9)
MONOCYTES NFR BLD AUTO: 9 % (ref 5–12)
NEUTROPHILS NFR BLD AUTO: 3.4 10*3/MM3 (ref 1.7–7)
NEUTROPHILS NFR BLD AUTO: 60.4 % (ref 42.7–76)
NRBC BLD AUTO-RTO: 0 /100 WBC (ref 0–0.2)
PLATELET # BLD AUTO: 266 10*3/MM3 (ref 140–450)
PMV BLD AUTO: 8.2 FL (ref 6–12)
POTASSIUM SERPL-SCNC: 4.4 MMOL/L (ref 3.5–5.2)
QT INTERVAL: 369 MS
QTC INTERVAL: 411 MS
RBC # BLD AUTO: 4 10*6/MM3 (ref 3.77–5.28)
SODIUM SERPL-SCNC: 140 MMOL/L (ref 136–145)
WBC NRBC COR # BLD AUTO: 5.7 10*3/MM3 (ref 3.4–10.8)

## 2024-01-12 PROCEDURE — 99222 1ST HOSP IP/OBS MODERATE 55: CPT | Performed by: NURSE PRACTITIONER

## 2024-01-12 PROCEDURE — 83735 ASSAY OF MAGNESIUM: CPT | Performed by: STUDENT IN AN ORGANIZED HEALTH CARE EDUCATION/TRAINING PROGRAM

## 2024-01-12 PROCEDURE — 80048 BASIC METABOLIC PNL TOTAL CA: CPT | Performed by: STUDENT IN AN ORGANIZED HEALTH CARE EDUCATION/TRAINING PROGRAM

## 2024-01-12 PROCEDURE — 25010000002 MORPHINE PER 10 MG: Performed by: INTERNAL MEDICINE

## 2024-01-12 PROCEDURE — 36415 COLL VENOUS BLD VENIPUNCTURE: CPT | Performed by: STUDENT IN AN ORGANIZED HEALTH CARE EDUCATION/TRAINING PROGRAM

## 2024-01-12 PROCEDURE — 85025 COMPLETE CBC W/AUTO DIFF WBC: CPT | Performed by: STUDENT IN AN ORGANIZED HEALTH CARE EDUCATION/TRAINING PROGRAM

## 2024-01-12 PROCEDURE — 72148 MRI LUMBAR SPINE W/O DYE: CPT

## 2024-01-12 RX ORDER — LORAZEPAM 1 MG/1
2 TABLET ORAL EVERY 12 HOURS PRN
Status: DISCONTINUED | OUTPATIENT
Start: 2024-01-12 | End: 2024-01-14 | Stop reason: HOSPADM

## 2024-01-12 RX ADMIN — MORPHINE SULFATE 2 MG: 2 INJECTION, SOLUTION INTRAMUSCULAR; INTRAVENOUS at 07:42

## 2024-01-12 RX ADMIN — DILTIAZEM HYDROCHLORIDE 240 MG: 240 CAPSULE, EXTENDED RELEASE ORAL at 08:56

## 2024-01-12 RX ADMIN — DOCUSATE SODIUM AND SENNOSIDES 2 TABLET: 8.6; 5 TABLET, FILM COATED ORAL at 08:55

## 2024-01-12 RX ADMIN — DOCUSATE SODIUM AND SENNOSIDES 2 TABLET: 8.6; 5 TABLET, FILM COATED ORAL at 20:14

## 2024-01-12 RX ADMIN — APIXABAN 5 MG: 5 TABLET, FILM COATED ORAL at 08:55

## 2024-01-12 RX ADMIN — Medication 10 ML: at 08:56

## 2024-01-12 RX ADMIN — MORPHINE SULFATE 2 MG: 2 INJECTION, SOLUTION INTRAMUSCULAR; INTRAVENOUS at 13:24

## 2024-01-12 RX ADMIN — Medication 10 ML: at 20:15

## 2024-01-12 RX ADMIN — HYDROCODONE BITARTRATE AND ACETAMINOPHEN 1 TABLET: 5; 325 TABLET ORAL at 20:25

## 2024-01-12 RX ADMIN — HYDROCODONE BITARTRATE AND ACETAMINOPHEN 1 TABLET: 5; 325 TABLET ORAL at 04:31

## 2024-01-12 RX ADMIN — APIXABAN 5 MG: 5 TABLET, FILM COATED ORAL at 20:14

## 2024-01-12 RX ADMIN — LIDOCAINE 2 PATCH: 50 PATCH TOPICAL at 08:55

## 2024-01-12 RX ADMIN — MORPHINE SULFATE 2 MG: 2 INJECTION, SOLUTION INTRAMUSCULAR; INTRAVENOUS at 23:05

## 2024-01-12 RX ADMIN — LORAZEPAM 2 MG: 1 TABLET ORAL at 13:24

## 2024-01-12 RX ADMIN — PANTOPRAZOLE SODIUM 40 MG: 40 INJECTION, POWDER, FOR SOLUTION INTRAVENOUS at 05:51

## 2024-01-12 RX ADMIN — Medication 1 EACH: at 04:31

## 2024-01-12 NOTE — PROGRESS NOTES
[facility] MEDICINE SERVICE  DAILY PROGRESS NOTE    NAME: Catalina Moreno  : 1940  MRN: [id number]      LOS: 1 day     PROVIDER OF SERVICE: Irving Oh MD    Chief Complaint: Atrial fibrillation with RVR    Subjective:     Interval History:  History taken from: patient  Patient Complaints: back pain    Catalina Moreno is a 83 y.o. female with PMH of paroxysmal atrial fibrillation, hypertension, CHF, CAD with pacemaker in situ, CKD, hypothyroid, anxiety, depression, and fibromyalgia presented to the hospital for low back pain, and was admitted with a principal diagnosis of Atrial fibrillation with RVR.  Patient states she has had an increase in her chronic low back pain over the last 1 to 2 days.  She states the pain is generalized does not radiate.  She denies numbness, tingling, bowel or bladder abnormalities.  While in the emergency department patient heart rate converted to atrial fibrillation with RVR rates in the 140s.  Patient was given digoxin based on notes stating she was on this medication in the past.  However, patient states she has not taken this for quite some time.  Patient did not convert with digoxin was given IV push Lopressor still with no conversion and was started on a Cardizem drip.  Patient will be admitted for further evaluation and treatment.     ACP: Patient wishes to be full code with full intervention; she is unsure who will be her decision-maker if she is unable due to the relationship she has with her children.     Patient was seen and examined on 24 at 02:51 EST .  24 seen in bed NAD, C/O back pain, MRI P, neurosurgery following. Pt/ot ordered     Subjective / Review of systems       Review of Systems  Constitutional:  Negative for diaphoresis and fatigue.   HENT:  Negative for congestion and sore throat.    Eyes:  Negative for pain and redness.   Respiratory:  Negative for cough and shortness of breath.    Cardiovascular:  Negative for chest  pain and leg swelling.   Gastrointestinal:  Negative for abdominal pain, nausea and vomiting.   Endocrine: Negative for polydipsia and polyphagia.   Genitourinary:  Negative for dysuria and flank pain.   Musculoskeletal:  Positive for back pain. Negative for joint swelling.   Skin:  Negative for color change and pallor.   Neurological:  Positive for weakness. Negative for dizziness and seizures.   Psychiatric/Behavioral:  Negative for behavioral problems and confusion.         Objective:     Vital Signs  Temp:  [97.4 °F (36.3 °C)-98.4 °F (36.9 °C)] 97.8 °F (36.6 °C)  Heart Rate:  [70-74] 70  Resp:  [12-18] 12  BP: (115-146)/(50-68) 132/50  Flow (L/min):  [2] 2   Body mass index is 29.02 kg/m².    Physical ExamConstitutional:       Appearance: Normal appearance.   HENT:      Head: Normocephalic.      Nose: Nose normal.      Mouth/Throat:      Mouth: Mucous membranes are moist.      Pharynx: Oropharynx is clear.   Eyes:      Pupils: Pupils are equal, round, and reactive to light.   Cardiovascular:      Rate and Rhythm: Tachycardia present. Rhythm irregular.      Pulses: Normal pulses.      Heart sounds: Normal heart sounds.   Pulmonary:      Effort: Pulmonary effort is normal.      Breath sounds: Normal breath sounds.   Abdominal:      General: Bowel sounds are normal.      Palpations: Abdomen is soft.   Musculoskeletal:         General: Normal range of motion.      Cervical back: Normal range of motion.   Skin:     General: Skin is warm and dry.      Capillary Refill: Capillary refill takes 2 to 3 seconds.   Neurological:      General: No focal deficit present.      Mental Status: She is alert and oriented to person, place, and time. Mental status is at baseline.   Psychiatric:         Mood and Affect: Mood normal.         Behavior: Behavior normal.         Thought Content: Thought content normal.         Judgment: Judgment normal.     Scheduled Meds   apixaban, 5 mg, Oral, Q12H  dilTIAZem CD, 240 mg, Oral,  Q24H  lidocaine, 2 patch, Transdermal, Q24H  pantoprazole, 40 mg, Intravenous, Q AM  senna-docusate sodium, 2 tablet, Oral, BID  sodium chloride, 10 mL, Intravenous, Q12H       PRN Meds     acetaminophen **OR** acetaminophen **OR** acetaminophen    benzocaine-menthol    senna-docusate sodium **AND** polyethylene glycol **AND** bisacodyl **AND** bisacodyl    Calcium Replacement - Follow Nurse / BPA Driven Protocol    HYDROcodone-acetaminophen    ketamine (KETALAR) 22.5 mg in sodium chloride 0.9 % 100 mL infusion **AND** Ketamine Vital Signs & Assessment    LORazepam    Magnesium Low Dose Replacement - Follow Nurse / BPA Driven Protocol    melatonin    Morphine    nitroglycerin    ondansetron ODT **OR** ondansetron    Phosphorus Replacement - Follow Nurse / BPA Driven Protocol    Potassium Replacement - Follow Nurse / BPA Driven Protocol    [COMPLETED] Insert Peripheral IV **AND** sodium chloride    sodium chloride    sodium chloride   Infusions         Diagnostic Data    Results from last 7 days   Lab Units 01/12/24  0400   WBC 10*3/mm3 5.70   HEMOGLOBIN g/dL 11.3*   HEMATOCRIT % 35.4   PLATELETS 10*3/mm3 266   GLUCOSE mg/dL 126*   CREATININE mg/dL 0.83   BUN mg/dL 22   SODIUM mmol/L 140   POTASSIUM mmol/L 4.4   ANION GAP mmol/L 8.0       XR Spine Lumbar 2 or 3 View    Result Date: 1/10/2024  Impression: No acute osseous abnormality. Moderate multilevel degenerative changes are present throughout the spine, most pronounced at L4-L5 and L5-S1. Spondylolisthesis at L3-L4 is related to degenerative change and stable. Electronically Signed: Marjorie oMyer MD  1/10/2024 11:27 PM EST  Workstation ID: [id number]       I reviewed the patient's new clinical results.    Assessment/Plan:     Active and Resolved Problems  Active Hospital Problems    Diagnosis  POA    **Atrial fibrillation with RVR [I48.91]  Yes    AF (atrial fibrillation) [I48.91]  Yes    Weakness [R53.1]  Yes    Atrial flutter with rapid ventricular response  [I48.92]  Yes    Chronic HFrEF (heart failure with reduced ejection fraction) [I50.22]  Yes    Presence of cardiac pacemaker [Z95.0]  Yes    Generalized anxiety disorder [F41.1]  Yes    Polyneuropathy, unspecified [G62.9]  Yes    Restless legs syndrome [G25.81]  Yes    Paroxysmal atrial fibrillation [I48.0]  Yes    Essential (primary) hypertension [I10]  Yes    Pulmonary hypertension, unspecified [I27.20]  Yes    Gastro-esophageal reflux disease without esophagitis [K21.9]  Yes    Age-related cognitive decline [R41.81]  Yes    Coronary artery disease involving native coronary artery of native heart without angina pectoris [I25.10]  Yes    Stage 3a chronic kidney disease [N18.31]  Yes    Chronic low back pain [M54.50, G89.29]  Yes    Depressive disorder [F32.A]  Yes    Primary fibromyalgia syndrome [M79.7]  Yes    Hypothyroidism [E03.9]  Yes    Arthritis [M19.90]  Yes      Resolved Hospital Problems    Diagnosis Date Resolved POA    Chronic systolic CHF (congestive heart failure) [I50.22] 01/11/2024 Yes       Afib with RVR  -TSH, 7.3  - PTT, INR  -EKG reviewed  -Echo 9/6/23, EF 51 to 55% with moderate aortic valve regurg and calcification  -Cardizem po  -Continuous heart monitor  -Monitor and replace magnesium, keep greater than 2, due to arrthymia  -Monitor and replace other electrolytes  -Consider metoprolol for PRN rate control  -Continue home Eliquis     Back pain/Weakness  -Radiology reviewed. MRI P  -spine consulted input appreciated   -As needed pain medicine available  -PT/OT     Essential Hypertension, Chronic, Controlled; CHF; CAD with pacemaker in situ  -Continue home medications as appropriate   - Monitor with routine vital signs      Chronic Kidney Disease  -Stable  -Monitor and trend labs as appropriate     Hypothyroidism  -Continue levothyroxine when appropriate     Anxiety/Depression; fibromyalgia  -Continue home meds as appropriate           Code Status (Patient has no pulse and is not breathing):  CPR (Attempt to Resuscitate)  Medical Interventions (Patient has pulse or is breathing): Full Support        Nutrition:   NPO Diet NPO Type: Strict NPO      DVT prophylaxis:  Medical and mechanical DVT prophylaxis orders are present.     DVT prophylaxis:  Medical and mechanical DVT prophylaxis orders are present.     Code status is   Code Status and Medical Interventions:   Ordered at: 01/11/24 0210     Code Status (Patient has no pulse and is not breathing):    CPR (Attempt to Resuscitate)     Medical Interventions (Patient has pulse or is breathing):    Full Support       Plan for disposition:nh in 2 days    Time: 30 minutes    Signature: Electronically signed by Irving Oh MD, 01/12/24, 11:42 EST.  [facility] Hospitalist Team

## 2024-01-12 NOTE — CASE MANAGEMENT/SOCIAL WORK
Continued Stay Note  [facility]     Patient Name: Catalina Moreno  MRN: [id number]  Today's Date: 1/12/2024    Admit Date: 1/10/2024    Plan: Referral to [facility] pending acceptance.  No precert needed. PASRR  approved.   Discharge Plan       Row Name 01/12/24 1717       Plan    Plan Referral to [facility] pending acceptance.  No precert needed. PASRR  approved.    Plan Comments DC barriers: pending MRI ,   referral sent to [facility] pending acceptance                   Sarahi Harris RN

## 2024-01-12 NOTE — CONSULTS
[facility]   Consult Note    Patient Name: Catalina Moreno  : 1940  MRN: [id number]  Primary Care Physician:  Anayeli Costa APRN  Referring Physician: No Known Provider  Date of admission: 1/10/2024    Inpatient Spine Surgery Consult  Consult performed by: More Gamble APRN  Consult ordered by: Irving Oh MD        Subjective   Subjective     Reason for Consult/ Chief Complaint: back and leg pain    History of Present Illness  Catalina Moreno is a 83 y.o. female that actually has been seen and evaluated with neurosurgery in the remote past with Dr. Coppola for her acquired spondylolisthesis.  She was actually worked up for surgery back in  that was ultimately canceled and she was lost to follow-up. She has been managed with pain management since then.  Patient presented to [place] emergency department on 1/10/2024 with complaints of increase of her chronic low back pain x 2 days and generalized weakness.  She was found to be in A-fib with RVR.  Her back pain is generalized with no specific radiation.  She denies any numbness and tingling, bowel or bladder dysfunction or saddle anesthesia.  She does follow with pain management but has not undergone any recent injection therapy.  Review of Systems   Musculoskeletal:  Positive for back pain.   Neurological:  Positive for weakness.   All other systems reviewed and are negative.       Personal History     Past Medical History:   Diagnosis Date    Acquired spondylolisthesis of lumbosacral region     Acute kidney injury 2022    Anxiety     Arthritis     Atrial fibrillation     paroxysmal    Broken shoulder     left-- due to fall 20 was at [facility]    Chronic pain disorder     Chronic systolic CHF (congestive heart failure) 2023    EF 36-40%    Coronary artery disease involving native coronary artery of native heart without angina pectoris 2021    nonobstructive    DDD (degenerative disc disease), cervical     DDD  (degenerative disc disease), lumbar     Depression     Edema     9/2020 foot    GERD (gastroesophageal reflux disease)     H/O fall     Heart failure with mid-range ejection fraction 03/05/2022    EF 45% per 2D echo    Hypertension     Hypothyroidism     Insomnia     Low back pain     Moderate mitral regurgitation 01/05/2023    Neuropathy     Nonrheumatic tricuspid valve regurgitation     Pain in both feet     Polypharmacy     PONV (postoperative nausea and vomiting)     Pulmonary hypertension     Radiculopathy     Restless legs     Sacroiliitis     Sick sinus syndrome     Added automatically from request for surgery [id number]    Spondylolisthesis     Stage 3a chronic kidney disease     Urinary incontinence     Vitamin D deficiency        Past Surgical History:   Procedure Laterality Date    BACK SURGERY  07/19/2018    PDC &  PSF L3-L5 insitu    CARDIAC CATHETERIZATION N/A 4/2/2021    Procedure: Cardiac Catheterization/Vascular Study;  Surgeon: Barrett Evans MD;  Location: [facility] CATH INVASIVE LOCATION;  Service: Cardiovascular;  Laterality: N/A;    CARDIAC ELECTROPHYSIOLOGY PROCEDURE N/A 1/17/2023    Procedure: Pacemaker DC new;  Surgeon: Baljeet Valero MD;  Location: [facility] CATH INVASIVE LOCATION;  Service: Cardiovascular;  Laterality: N/A;    CHOLECYSTECTOMY      COLONOSCOPY      ENDOSCOPY N/A 9/14/2023    Procedure: ESOPHAGOGASTRODUODENOSCOPY;  Surgeon: Ulysses Lamas MD;  Location: [facility] ENDOSCOPY;  Service: Gastroenterology;  Laterality: N/A;  gastritis, inflammatory gastric polyp    HYSTERECTOMY      ROTATOR CUFF REPAIR Left        Family History: family history includes Cancer in her father, paternal aunt, and paternal uncle; Diabetes in her son; Heart disease in her paternal aunt. Otherwise pertinent FHx was reviewed and not pertinent to current issue.    Social History:  reports that she has never smoked. She has never used smokeless tobacco. She reports that she does not drink  alcohol and does not use drugs.    Home Medications:   apixaban, atorvastatin, metoprolol succinate XL, rOPINIRole, and sacubitril-valsartan    Allergies:  Allergies   Allergen Reactions    Baclofen Rash    Codeine Nausea Only    Ibuprofen Unknown (See Comments)     Patient doesn't know----Motin    Methocarbamol Unknown (See Comments)     Patient doesn't know    Naproxen Unknown (See Comments)     Pt. Doesn't know     Tizanidine Hcl Other (See Comments)     Syncope     Tolmetin Dizziness     Pt. Doesn't know-- same as Tolectin       Objective    Objective     Vitals:  Temp:  [97.4 °F (36.3 °C)-98.4 °F (36.9 °C)] 97.8 °F (36.6 °C)  Heart Rate:  [70-74] 70  Resp:  [12-18] 12  BP: (115-146)/(50-68) 132/50  Flow (L/min):  [2] 2    Physical Exam  Alert and oriented x 3  Moving all extremities with good symmetrical lower extremity strength.    Result Review    Result Review:  I have personally reviewed the results from the time of this admission to 1/12/2024 09:08 EST and agree with these findings:  []  Laboratory list / accordion  []  Microbiology  [x]  Radiology  []  EKG/Telemetry   []  Cardiology/Vascular   []  Pathology  []  Old records  []  Other:  Most notable findings include: Lumbar x-rays demonstrate what looks like fairly stable spondylolisthesis at L3-L4 and L4-L5.      Assessment & Plan   Assessment / Plan     Brief Patient Summary:  Catalina Moreno is a 83 y.o. female who presents with multilevel spondylolisthesis and chronic worsening leg weakness and pain.  Recent radiographs demonstrate fairly stable findings in regards to her spondylolisthesis when compared to imaging in 2020.  Patient presents with no cauda equina or other emergent symptoms requiring any emergent/urgent neurosurgical intervention.  MRI imaging has been ordered and her pacer will need to be checked for compatibility.  We will await lumbar MRI imaging and offer further recommendations after completed.  Patient is ultimately a poor  elective surgical candidate and would need extensive cardiac clearance prior to any proposed surgery.  She is wanting to exhaust all other measures prior to consideration of surgery.      active Hospital Problems:  Active Hospital Problems    Diagnosis     **Atrial fibrillation with RVR     AF (atrial fibrillation)     Weakness     Atrial flutter with rapid ventricular response     Chronic HFrEF (heart failure with reduced ejection fraction)     Presence of cardiac pacemaker     Generalized anxiety disorder     Polyneuropathy, unspecified     Restless legs syndrome     Paroxysmal atrial fibrillation     Essential (primary) hypertension     Pulmonary hypertension, unspecified     Gastro-esophageal reflux disease without esophagitis     Age-related cognitive decline     Coronary artery disease involving native coronary artery of native heart without angina pectoris     Stage 3a chronic kidney disease     Chronic low back pain     Depressive disorder     Primary fibromyalgia syndrome     Hypothyroidism     Arthritis      Plan:     -MRI lumbar imaging pending-check compatibility pacer  - Repeat lumbar x-rays with flexion-extension  -Pain management per primary  - Neurosurgery will follow-up once imaging is completed  - Please call for any questions or concerns    I discussed findings patient, nursing staff and Dr. Hung Gamble, APRN

## 2024-01-12 NOTE — DISCHARGE PLACEMENT REQUEST
"Catalina Carrera (83 y.o. Female)       Date of Birth   1940    Social Security Number       Address   [address] [place] [address] [place] [facility]    Home Phone   [phone]    MRN   [id number]       Protestant   None    Marital Status                               Admission Date   1/10/24    Admission Type   Emergency    Admitting Provider   Francis Mullen DO    Attending Provider   Irving Oh MD    Department, Room/Bed   [facility] SURGICAL INPATIENT, 4113/1       Discharge Date       Discharge Disposition       Discharge Destination                                 Attending Provider: Irving Oh MD    Allergies: Baclofen, Codeine, Ibuprofen, Methocarbamol, Naproxen, Tizanidine Hcl, Tolmetin    Isolation: None   Infection: None   Code Status: CPR    Ht: 160 cm (63\")   Wt: 74.3 kg (163 lb 12.8 oz)    Admission Cmt: None   Principal Problem: Atrial fibrillation with RVR [I48.91]                   Active Insurance as of 1/10/2024       Primary Coverage       Payor Plan Insurance Group Employer/Plan Group    MEDICARE MEDICARE A & B        Payor Plan Address Payor Plan Phone Number Payor Plan Fax Number Effective Dates    [address] [phone]  4/1/2006 - None Entered    [facility] [address]         Subscriber Name Subscriber Birth Date Member ID       CATALINA CARRERA 1940 [id number]               Secondary Coverage       Payor Plan Insurance Group Employer/Plan Group    ANTHEM BLUE CROSS ANTHEM BLUE CROSS BLUE SHIELD PPO [id number]       Payor Plan Address Payor Plan Phone Number Payor Plan Fax Number Effective Dates    [address] [phone]  2/2/2022 - None Entered    [facility] [id number]         Subscriber Name Subscriber Birth Date Member ID       CATALINA CARRERA 1940 [id number]               Tertiary Coverage       Payor Plan Insurance Group Employer/Plan Group    [place]        Payor Plan Address Payor Plan Phone " Number Payor Plan Fax Number Effective Dates    [address]   5/1/2022 - None Entered    [place] [address]         Subscriber Name Subscriber Birth Date Member ID       RADHA CARRERA 1940 [id number]                     Emergency Contacts        (Rel.) Home Phone Work Phone Mobile Phone    MADHAV DEAN (Son) [phone] -- [phone]    REILLY ESPINOZA (Friend) -- -- [phone]

## 2024-01-12 NOTE — PLAN OF CARE
Goal Outcome Evaluation:                                            Patient with pain throughout the day, controlled per MAR. Plan of care ongoing.

## 2024-01-12 NOTE — DISCHARGE PLACEMENT REQUEST
"Catalina Carrera (83 y.o. Female)       Date of Birth   1940    Social Security Number       Address   [address] [place] [address] [place] [facility]    Home Phone   [phone]    MRN   [id number]       [employer]aritan   None    Marital Status                               Admission Date   1/10/24    Admission Type   Emergency    Admitting Provider   Francis Mullen DO    Attending Provider   Irving Oh MD    Department, Room/Bed   [facility] SURGICAL INPATIENT, 4113/1       Discharge Date       Discharge Disposition       Discharge Destination                                 Attending Provider: Irving Oh MD    Allergies: Baclofen, Codeine, Ibuprofen, Methocarbamol, Naproxen, Tizanidine Hcl, Tolmetin    Isolation: None   Infection: None   Code Status: CPR    Ht: 160 cm (63\")   Wt: 74.3 kg (163 lb 12.8 oz)    Admission Cmt: None   Principal Problem: Atrial fibrillation with RVR [I48.91]                   Active Insurance as of 1/10/2024       Primary Coverage       Payor Plan Insurance Group Employer/Plan Group    MEDICARE MEDICARE A & B        Payor Plan Address Payor Plan Phone Number Payor Plan Fax Number Effective Dates    [address] [phone]  4/1/2006 - None Entered    [facility] [address]         Subscriber Name Subscriber Birth Date Member ID       CATALINA CARRERA 1940 [id number]               Secondary Coverage       Payor Plan Insurance Group Employer/Plan Group    ANTHEM BLUE CROSS ANTHEM BLUE CROSS BLUE SHIELD PPO [id number]       Payor Plan Address Payor Plan Phone Number Payor Plan Fax Number Effective Dates    [address] [phone]  2/2/2022 - None Entered    [facility] [address]         Subscriber Name Subscriber Birth Date Member ID       CATALINA CARRERA 1940 [id number]               Tertiary Coverage       Payor Plan Insurance Group Employer/Plan Group    [place]        Payor Plan Address Payor Plan Phone " Number Payor Plan Fax Number Effective Dates    [address]   5/1/2022 - None Entered    [place] [address]         Subscriber Name Subscriber Birth Date Member ID       RADHA CARRERA 1940 [id number]                     Emergency Contacts        (Rel.) Home Phone Work Phone Mobile Phone    MADHAV DEAN (Son) [phone] -- [phone]    REILLY ESPINOAZ (Friend) -- -- [phone]

## 2024-01-13 PROCEDURE — 97112 NEUROMUSCULAR REEDUCATION: CPT

## 2024-01-13 PROCEDURE — 97535 SELF CARE MNGMENT TRAINING: CPT

## 2024-01-13 PROCEDURE — 99232 SBSQ HOSP IP/OBS MODERATE 35: CPT | Performed by: NURSE PRACTITIONER

## 2024-01-13 PROCEDURE — 25010000002 DEXAMETHASONE PER 1 MG: Performed by: NURSE PRACTITIONER

## 2024-01-13 PROCEDURE — 97530 THERAPEUTIC ACTIVITIES: CPT

## 2024-01-13 PROCEDURE — 25010000002 MORPHINE PER 10 MG: Performed by: INTERNAL MEDICINE

## 2024-01-13 RX ORDER — DEXAMETHASONE SODIUM PHOSPHATE 4 MG/ML
4 INJECTION, SOLUTION INTRA-ARTICULAR; INTRALESIONAL; INTRAMUSCULAR; INTRAVENOUS; SOFT TISSUE ONCE
Status: COMPLETED | OUTPATIENT
Start: 2024-01-13 | End: 2024-01-13

## 2024-01-13 RX ORDER — DEXAMETHASONE 6 MG/1
3 TABLET ORAL EVERY 12 HOURS SCHEDULED
Status: DISCONTINUED | OUTPATIENT
Start: 2024-01-14 | End: 2024-01-14 | Stop reason: HOSPADM

## 2024-01-13 RX ADMIN — APIXABAN 5 MG: 5 TABLET, FILM COATED ORAL at 09:20

## 2024-01-13 RX ADMIN — DOCUSATE SODIUM AND SENNOSIDES 2 TABLET: 8.6; 5 TABLET, FILM COATED ORAL at 20:42

## 2024-01-13 RX ADMIN — DEXAMETHASONE SODIUM PHOSPHATE 4 MG: 4 INJECTION, SOLUTION INTRAMUSCULAR; INTRAVENOUS at 09:21

## 2024-01-13 RX ADMIN — Medication 10 ML: at 20:41

## 2024-01-13 RX ADMIN — Medication 1 EACH: at 07:21

## 2024-01-13 RX ADMIN — MORPHINE SULFATE 2 MG: 2 INJECTION, SOLUTION INTRAMUSCULAR; INTRAVENOUS at 05:38

## 2024-01-13 RX ADMIN — MORPHINE SULFATE 2 MG: 2 INJECTION, SOLUTION INTRAMUSCULAR; INTRAVENOUS at 18:03

## 2024-01-13 RX ADMIN — HYDROCODONE BITARTRATE AND ACETAMINOPHEN 1 TABLET: 5; 325 TABLET ORAL at 20:42

## 2024-01-13 RX ADMIN — PANTOPRAZOLE SODIUM 40 MG: 40 INJECTION, POWDER, FOR SOLUTION INTRAVENOUS at 05:32

## 2024-01-13 RX ADMIN — Medication 10 ML: at 09:23

## 2024-01-13 RX ADMIN — DILTIAZEM HYDROCHLORIDE 240 MG: 240 CAPSULE, EXTENDED RELEASE ORAL at 09:20

## 2024-01-13 RX ADMIN — APIXABAN 5 MG: 5 TABLET, FILM COATED ORAL at 20:42

## 2024-01-13 RX ADMIN — DOCUSATE SODIUM AND SENNOSIDES 2 TABLET: 8.6; 5 TABLET, FILM COATED ORAL at 09:20

## 2024-01-13 NOTE — PROGRESS NOTES
[facility] MEDICINE SERVICE  DAILY PROGRESS NOTE    NAME: Catalina Moreno  : 1940  MRN: [id number]      LOS: 2 days     PROVIDER OF SERVICE: Irving Oh MD    Chief Complaint: Atrial fibrillation with RVR    Subjective:     Interval History:  History taken from: patient  Patient Complaints: back pain    Catalina Moreno is a 83 y.o. female with PMH of paroxysmal atrial fibrillation, hypertension, CHF, CAD with pacemaker in situ, CKD, hypothyroid, anxiety, depression, and fibromyalgia presented to the hospital for low back pain, and was admitted with a principal diagnosis of Atrial fibrillation with RVR.  Patient states she has had an increase in her chronic low back pain over the last 1 to 2 days.  She states the pain is generalized does not radiate.  She denies numbness, tingling, bowel or bladder abnormalities.  While in the emergency department patient heart rate converted to atrial fibrillation with RVR rates in the 140s.  Patient was given digoxin based on notes stating she was on this medication in the past.  However, patient states she has not taken this for quite some time.  Patient did not convert with digoxin was given IV push Lopressor still with no conversion and was started on a Cardizem drip.  Patient will be admitted for further evaluation and treatment.     ACP: Patient wishes to be full code with full intervention; she is unsure who will be her decision-maker if she is unable due to the relationship she has with her children.     Patient was seen and examined on 24 at 02:51 EST .  24 seen in bed NAD, C/O back pain, MRI P, neurosurgery following. Pt/ot ordered  24 patient seen and examined in bed no acute distress, vital signs stable, pain better controlled.  Discussed with RN. some Urinary retention overnight.     Subjective / Review of systems       Review of Systems  Constitutional:  Negative for diaphoresis and fatigue.   HENT:  Negative for congestion  and sore throat.    Eyes:  Negative for pain and redness.   Respiratory:  Negative for cough and shortness of breath.    Cardiovascular:  Negative for chest pain and leg swelling.   Gastrointestinal:  Negative for abdominal pain, nausea and vomiting.   Endocrine: Negative for polydipsia and polyphagia.   Genitourinary:  Negative for dysuria and flank pain.   Musculoskeletal:  Positive for back pain. Negative for joint swelling.   Skin:  Negative for color change and pallor.   Neurological:  Positive for weakness. Negative for dizziness and seizures.   Psychiatric/Behavioral:  Negative for behavioral problems and confusion.         Objective:     Vital Signs  Temp:  [97.2 °F (36.2 °C)-98.4 °F (36.9 °C)] 98.4 °F (36.9 °C)  Heart Rate:  [69-72] 72  Resp:  [11-17] 15  BP: (112-140)/(47-69) 112/47  Flow (L/min):  [2] 2   Body mass index is 29.02 kg/m².    Physical ExamConstitutional:       Appearance: Normal appearance.   HENT:      Head: Normocephalic.      Nose: Nose normal.      Mouth/Throat:      Mouth: Mucous membranes are moist.      Pharynx: Oropharynx is clear.   Eyes:      Pupils: Pupils are equal, round, and reactive to light.   Cardiovascular:      Rate and Rhythm: Tachycardia present. Rhythm irregular.      Pulses: Normal pulses.      Heart sounds: Normal heart sounds.   Pulmonary:      Effort: Pulmonary effort is normal.      Breath sounds: Normal breath sounds.   Abdominal:      General: Bowel sounds are normal.      Palpations: Abdomen is soft.   Musculoskeletal:         General: Normal range of motion.      Cervical back: Normal range of motion.   Skin:     General: Skin is warm and dry.      Capillary Refill: Capillary refill takes 2 to 3 seconds.   Neurological:      General: No focal deficit present.      Mental Status: She is alert and oriented to person, place, and time. Mental status is at baseline.   Psychiatric:         Mood and Affect: Mood normal.         Behavior: Behavior normal.          Thought Content: Thought content normal.         Judgment: Judgment normal.     Scheduled Meds   apixaban, 5 mg, Oral, Q12H  [START ON 1/14/2024] dexAMETHasone, 3 mg, Oral, Q12H  dilTIAZem CD, 240 mg, Oral, Q24H  lidocaine, 2 patch, Transdermal, Q24H  pantoprazole, 40 mg, Intravenous, Q AM  senna-docusate sodium, 2 tablet, Oral, BID  sodium chloride, 10 mL, Intravenous, Q12H       PRN Meds     acetaminophen **OR** acetaminophen **OR** acetaminophen    benzocaine-menthol    senna-docusate sodium **AND** polyethylene glycol **AND** bisacodyl **AND** bisacodyl    Calcium Replacement - Follow Nurse / BPA Driven Protocol    HYDROcodone-acetaminophen    ketamine (KETALAR) 22.5 mg in sodium chloride 0.9 % 100 mL infusion **AND** Ketamine Vital Signs & Assessment    LORazepam    Magnesium Low Dose Replacement - Follow Nurse / BPA Driven Protocol    melatonin    Morphine    nitroglycerin    ondansetron ODT **OR** ondansetron    Phosphorus Replacement - Follow Nurse / BPA Driven Protocol    Potassium Replacement - Follow Nurse / BPA Driven Protocol    [COMPLETED] Insert Peripheral IV **AND** sodium chloride    sodium chloride    sodium chloride   Infusions         Diagnostic Data    Results from last 7 days   Lab Units 01/12/24  0400   WBC 10*3/mm3 5.70   HEMOGLOBIN g/dL 11.3*   HEMATOCRIT % 35.4   PLATELETS 10*3/mm3 266   GLUCOSE mg/dL 126*   CREATININE mg/dL 0.83   BUN mg/dL 22   SODIUM mmol/L 140   POTASSIUM mmol/L 4.4   ANION GAP mmol/L 8.0       MRI Lumbar Spine Without Contrast    Result Date: 1/12/2024  Impression: 1. Multilevel degenerative changes. The most prominent findings are at L3/4 where there is moderate to severe bilateral neural foramina stenosis with suspected compromise of the exiting L3 nerve roots. Electronically Signed: Meghann Catalan MD  1/12/2024 1:58 PM CST  Workstation ID: [id number]       I reviewed the patient's new clinical results.    Assessment/Plan:     Active and Resolved Problems  Active  Hospital Problems    Diagnosis  POA    **Atrial fibrillation with RVR [I48.91]  Yes    AF (atrial fibrillation) [I48.91]  Yes    Weakness [R53.1]  Yes    Atrial flutter with rapid ventricular response [I48.92]  Yes    Chronic HFrEF (heart failure with reduced ejection fraction) [I50.22]  Yes    Presence of cardiac pacemaker [Z95.0]  Yes    Generalized anxiety disorder [F41.1]  Yes    Polyneuropathy, unspecified [G62.9]  Yes    Restless legs syndrome [G25.81]  Yes    Paroxysmal atrial fibrillation [I48.0]  Yes    Essential (primary) hypertension [I10]  Yes    Pulmonary hypertension, unspecified [I27.20]  Yes    Gastro-esophageal reflux disease without esophagitis [K21.9]  Yes    Age-related cognitive decline [R41.81]  Yes    Coronary artery disease involving native coronary artery of native heart without angina pectoris [I25.10]  Yes    Stage 3a chronic kidney disease [N18.31]  Yes    Chronic low back pain [M54.50, G89.29]  Yes    Depressive disorder [F32.A]  Yes    Primary fibromyalgia syndrome [M79.7]  Yes    Hypothyroidism [E03.9]  Yes    Arthritis [M19.90]  Yes      Resolved Hospital Problems    Diagnosis Date Resolved POA    Chronic systolic CHF (congestive heart failure) [I50.22] 01/11/2024 Yes       Afib with RVR  -TSH, 7.3  - PTT, INR  -EKG reviewed  -Echo 9/6/23, EF 51 to 55% with moderate aortic valve regurg and calcification  -Cardizem po  -Continuous heart monitor  -Monitor and replace magnesium, keep greater than 2, due to arrthymia  -Monitor and replace other electrolytes  -Consider metoprolol for PRN rate control  -Continue home Eliquis     Back pain/Weakness  -Radiology reviewed. MRI P  -spine consulted input appreciated > MRI has been completed and reviewed demonstrating some worsening of her degenerative lumbar spine since 2020.  She now has a mild degenerative spondylolisthesis at L3-L4 with some foraminal narrowing bilaterally. No severe canal stenosis appreciated.  Uncertain if the  spondylolisthesis is  dynamic as flexion-extension imaging was not completed yesterday.   Neurosurgery still does not feel need for any urgent neurosurgical intervention, but would like patient to continue with medical management and recommending addition of some steroid therapy.    Patient is not candidate for any current lumbar injection therapy secondary to her Eliquis use but may be option in the outpatient setting if given permission to hold.    Lumbar flexion and extension imaging pending  Dexamethasone 4 mg Q 12 hr  Pain management per primary  -As needed pain medicine available  -PT/OT     Essential Hypertension, Chronic, Controlled; CHF; CAD with pacemaker in situ  -Continue home medications as appropriate   - Monitor with routine vital signs      Chronic Kidney Disease-Stable  -Monitor and trend labs as appropriate     Hypothyroidism  -Continue levothyroxine when appropriate     Anxiety/Depression; fibromyalgia  -Continue home meds as appropriate      Code Status (Patient has no pulse and is not breathing): CPR (Attempt to Resuscitate)  Medical Interventions (Patient has pulse or is breathing): Full Support        Nutrition:   NPO Diet NPO Type: Strict NPO     DVT prophylaxis:  Medical and mechanical DVT prophylaxis orders are present.     Code status is   Code Status and Medical Interventions:   Ordered at: 01/11/24 0210     Code Status (Patient has no pulse and is not breathing):    CPR (Attempt to Resuscitate)     Medical Interventions (Patient has pulse or is breathing):    Full Support       Plan for disposition:nh in 2 days    Time: 30 minutes    Signature: Electronically signed by Irving Oh MD, 01/13/24, 13:56 EST.  [facility] ([facility]ist [facility]

## 2024-01-13 NOTE — THERAPY TREATMENT NOTE
"Subjective: Pt agreeable to therapeutic plan of care.    Objective:     Bed mobility - SBA to logroll to R with bed rail  Transfers - Min-A and with rolling walker  Ambulation - 5 feet Min-A and with rolling walker    Vitals: /55    Pain:  sl pain in back but much less than before  Intervention for pain: Repositioned, Increased Activity, and Therapeutic Presence    Education: Provided education on the importance of mobility in the acute care setting, Verbal/Tactile Cues, ADL training, Transfer Training, and Gait Training, spine prec    Assessment: Catalina Moreno presents with functional mobility impairments which indicate the need for skilled intervention. Tolerating session today without incident. Pt was more mobile today but still very guarded to make sure she didn't incr her pain in back. [facility] ind with [facility] on  commode. Plans on rehab at [place].Will continue to follow and progress as tolerated.     Plan/Recommendations:   If medically appropriate, Moderate Intensity Therapy recommended post-acute care. This is recommended as therapy feels the patient would require 3-4 days per week and wouldn't tolerate \"3 hour daily\" rehab intensity. SNF would be the preferred choice. If the patient does not agree to SNF, arrange HH or OP depending on home bound status. If patient is medically complex, consider LTACH. Pt requires no DME at discharge.     Pt desires Skilled Rehab placement at discharge. Pt cooperative; agreeable to therapeutic recommendations and plan of care.         Basic Mobility 6-click:  Rollin = Total, A lot = 2, A little = 3; 4 = None  Supine>Sit:   1 = Total, A lot = 2, A little = 3; 4 = None   Sit>Stand with arms:  1 = Total, A lot = 2, A little = 3; 4 = None  Bed>Chair:   1 = Total, A lot = 2, A little = 3; 4 = None  Ambulate in room:  1 = Total, A lot = 2, A little = 3; 4 = None  3-5 Steps with railin = Total, A lot = 2, A little = 3; 4 = None  Score: 18    Post-Tx Position: " Up in Chair and Alarms activated  PPE: gloves

## 2024-01-13 NOTE — PLAN OF CARE
Goal Outcome Evaluation:   Pt VSS, pt has had some c/o of pain this shift. Controlled with IV/oral meds. Pt has not had much urine output this shift but does have a history of dysuria. Pt resting in between care with call light in reach. Plan of care ongoing.

## 2024-01-13 NOTE — CASE MANAGEMENT/SOCIAL WORK
Continued Stay Note   Gabriel     Patient Name: Catalina Moreno  MRN: [id number]  Today's Date: 1/13/2024    Admit Date: 1/10/2024    Plan: [facility] accepte.  Bed avail 1/14.  no precert required.  PASRR approved.   Discharge Plan       Row Name 01/13/24 1512       Plan    Plan [facility] accepte.  Bed avail 1/14.  no precert required.  PASRR approved.    Plan Comments noted that pt was accepted in [facility].  notified liasion that pt likely ready for d/c on Sunday 1/14                      Expected Discharge Date and Time       Expected Discharge Date Expected Discharge Time    Jan 14, 2024               Renetta Odell RN

## 2024-01-13 NOTE — PROGRESS NOTES
NEUROSURGERY PROGRESS NOTE     LOS: 2 days   Patient Care Team:  Anayeli Costa APRN as PCP - General (Family Medicine)  Flash Gonzalez MD as Consulting Physician (Cardiology)    Chief Complaint:    Chief Complaint   Patient presents with    Back Pain    Leg pain/weakness    Subjective     Interval History: NAEO.  Patient continues with some back pain and leg pain.      History taken from: patient chart    Objective      Vital Signs  Temp:  [97.2 °F (36.2 °C)-98 °F (36.7 °C)] 97.2 °F (36.2 °C)  Heart Rate:  [69-71] 69  Resp:  [11-17] 11  BP: ()/(42-69) 140/69  Body mass index is 29.02 kg/m².    Intake/Output last 3 shifts:  I/O last 3 completed shifts:  In: 360 [P.O.:360]  Out: 550 [Urine:550]    Intake/Output this shift:  No intake/output data recorded.    Physical    GENERAL: No acute distress. Appears well nourished. Appears stated age.  HEENT: Atraumatic and normocephalic  CARDIO: RRR on monitoring. Pulses 2+  PULM: breathing nonlabored on room air  NEURO: AAOx3. EOMI. PERRL. CNi. Strength symmetric in lower extremities with some proximal over distal weakness. sensation intact to light touch. Reflexes 2+ throughout.        Results Review:      Labs:    Lab Results (last 24 hours)       ** No results found for the last 24 hours. **            Imaging:    MRI lumbar spine    Imaging demonstrates some worsening degenerative disc and degenerative joint changes mainly noted at L3-L4 where there is also a mild spondylolisthesis.  This is when compared to previous 2020 imaging.  Moderate canal stenosis and foraminal stenosis that looks to contact/affect the bilateral L3 nerve roots.          Current Medications:   Scheduled Meds:apixaban, 5 mg, Oral, Q12H  dilTIAZem CD, 240 mg, Oral, Q24H  lidocaine, 2 patch, Transdermal, Q24H  pantoprazole, 40 mg, Intravenous, Q AM  senna-docusate sodium, 2 tablet, Oral, BID  sodium chloride, 10 mL, Intravenous, Q12H      Continuous Infusions:     Assessment & Plan        Atrial fibrillation with RVR    Arthritis    Depressive disorder    Primary fibromyalgia syndrome    Hypothyroidism    Stage 3a chronic kidney disease    Chronic low back pain    Age-related cognitive decline    Generalized anxiety disorder    Polyneuropathy, unspecified    Restless legs syndrome    Paroxysmal atrial fibrillation    Essential (primary) hypertension    Gastro-esophageal reflux disease without esophagitis    Pulmonary hypertension, unspecified    Coronary artery disease involving native coronary artery of native heart without angina pectoris    Presence of cardiac pacemaker    Chronic HFrEF (heart failure with reduced ejection fraction)    Atrial flutter with rapid ventricular response    Weakness    AF (atrial fibrillation)      Assessment/Plan:  Catalina Moreno is a 83 y.o. female with chronic worsening back and leg pain.  MRI has been completed and reviewed demonstrating some worsening of her degenerative lumbar spine since 2020.  She now has a mild degenerative spondylolisthesis at L3-L4 with some foraminal narrowing bilaterally. No severe canal stenosis appreciated.  Uncertain if the spondylolisthesis is  dynamic as flexion-extension imaging was not completed yesterday.   Neurosurgery still does not feel need for any urgent neurosurgical intervention, but would like patient to continue with medical management and recommending addition of some steroid therapy.    Patient is not candidate for any current lumbar injection therapy secondary to her Eliquis use but may be option in the outpatient setting if given permission to hold.       Plan:    Lumbar flexion and extension imaging pending  Dexamethasone 4 mg Q 12 hr  Pain management per primary    I discussed findings with patient, Dr. Kadi Gamble, APRN  01/13/24  08:50 EST

## 2024-01-13 NOTE — PLAN OF CARE
Assessment: Catalina Moreno presents with functional mobility impairments which indicate the need for skilled intervention. Tolerating session today without incident. Pt was more mobile today but still very guarded to make sure she didn't incr her pain in back. Was [facility] on  commode. Plans on rehab at [place].Will continue to follow and progress as tolerated.

## 2024-01-14 ENCOUNTER — APPOINTMENT (OUTPATIENT)
Dept: GENERAL RADIOLOGY | Facility: HOSPITAL | Age: 84
End: 2024-01-14
Payer: MEDICARE

## 2024-01-14 VITALS
TEMPERATURE: 98.4 F | RESPIRATION RATE: 17 BRPM | OXYGEN SATURATION: 91 % | WEIGHT: 163.8 LBS | SYSTOLIC BLOOD PRESSURE: 143 MMHG | DIASTOLIC BLOOD PRESSURE: 70 MMHG | BODY MASS INDEX: 29.02 KG/M2 | HEART RATE: 106 BPM | HEIGHT: 63 IN

## 2024-01-14 PROBLEM — I50.32 CHRONIC HEART FAILURE WITH PRESERVED EJECTION FRACTION (HFPEF): Status: ACTIVE | Noted: 2024-01-14

## 2024-01-14 PROCEDURE — 25010000002 MORPHINE PER 10 MG: Performed by: INTERNAL MEDICINE

## 2024-01-14 PROCEDURE — 72114 X-RAY EXAM L-S SPINE BENDING: CPT

## 2024-01-14 RX ORDER — AMOXICILLIN 250 MG
1 CAPSULE ORAL DAILY
Qty: 30 TABLET | Refills: 0 | Status: SHIPPED | OUTPATIENT
Start: 2024-01-14 | End: 2024-01-14 | Stop reason: SDUPTHER

## 2024-01-14 RX ORDER — LIDOCAINE 50 MG/G
2 PATCH TOPICAL
Qty: 30 EACH | Refills: 0 | Status: SHIPPED | OUTPATIENT
Start: 2024-01-15 | End: 2024-01-14 | Stop reason: SDUPTHER

## 2024-01-14 RX ORDER — METOPROLOL SUCCINATE 25 MG/1
12.5 TABLET, EXTENDED RELEASE ORAL EVERY 24 HOURS
Qty: 30 TABLET | Refills: 1 | Status: SHIPPED | OUTPATIENT
Start: 2024-01-14

## 2024-01-14 RX ORDER — HYDROCODONE BITARTRATE AND ACETAMINOPHEN 5; 325 MG/1; MG/1
1 TABLET ORAL EVERY 8 HOURS PRN
Qty: 21 TABLET | Refills: 0 | Status: SHIPPED | OUTPATIENT
Start: 2024-01-14 | End: 2024-01-14 | Stop reason: SDUPTHER

## 2024-01-14 RX ORDER — LIDOCAINE 50 MG/G
2 PATCH TOPICAL
Qty: 30 EACH | Refills: 0 | Status: SHIPPED | OUTPATIENT
Start: 2024-01-15

## 2024-01-14 RX ORDER — AMOXICILLIN 250 MG
1 CAPSULE ORAL DAILY
Qty: 30 TABLET | Refills: 0 | Status: SHIPPED | OUTPATIENT
Start: 2024-01-14

## 2024-01-14 RX ORDER — DILTIAZEM HYDROCHLORIDE 240 MG/1
240 CAPSULE, COATED, EXTENDED RELEASE ORAL
Qty: 30 CAPSULE | Refills: 0 | Status: SHIPPED | OUTPATIENT
Start: 2024-01-15

## 2024-01-14 RX ORDER — METOPROLOL SUCCINATE 25 MG/1
12.5 TABLET, EXTENDED RELEASE ORAL EVERY 24 HOURS
Qty: 30 TABLET | Refills: 0 | Status: SHIPPED | OUTPATIENT
Start: 2024-01-14 | End: 2024-01-14 | Stop reason: SDUPTHER

## 2024-01-14 RX ORDER — DILTIAZEM HYDROCHLORIDE 240 MG/1
240 CAPSULE, COATED, EXTENDED RELEASE ORAL
Qty: 30 CAPSULE | Refills: 0 | Status: SHIPPED | OUTPATIENT
Start: 2024-01-15 | End: 2024-01-14 | Stop reason: SDUPTHER

## 2024-01-14 RX ORDER — DEXAMETHASONE 1.5 MG/1
3 TABLET ORAL EVERY 12 HOURS SCHEDULED
Qty: 6 TABLET | Refills: 0 | Status: SHIPPED | OUTPATIENT
Start: 2024-01-14 | End: 2024-01-16

## 2024-01-14 RX ORDER — HYDROCODONE BITARTRATE AND ACETAMINOPHEN 5; 325 MG/1; MG/1
1 TABLET ORAL EVERY 8 HOURS PRN
Qty: 21 TABLET | Refills: 0 | Status: SHIPPED | OUTPATIENT
Start: 2024-01-14 | End: 2024-01-21

## 2024-01-14 RX ORDER — DEXAMETHASONE 1.5 MG/1
3 TABLET ORAL EVERY 12 HOURS SCHEDULED
Qty: 6 TABLET | Refills: 0 | Status: SHIPPED | OUTPATIENT
Start: 2024-01-14 | End: 2024-01-14 | Stop reason: SDUPTHER

## 2024-01-14 RX ADMIN — DILTIAZEM HYDROCHLORIDE 240 MG: 240 CAPSULE, EXTENDED RELEASE ORAL at 10:27

## 2024-01-14 RX ADMIN — PANTOPRAZOLE SODIUM 40 MG: 40 INJECTION, POWDER, FOR SOLUTION INTRAVENOUS at 06:32

## 2024-01-14 RX ADMIN — Medication 10 ML: at 10:35

## 2024-01-14 RX ADMIN — HYDROCODONE BITARTRATE AND ACETAMINOPHEN 1 TABLET: 5; 325 TABLET ORAL at 00:38

## 2024-01-14 RX ADMIN — DEXAMETHASONE 3 MG: 6 TABLET ORAL at 10:27

## 2024-01-14 RX ADMIN — APIXABAN 5 MG: 5 TABLET, FILM COATED ORAL at 10:27

## 2024-01-14 RX ADMIN — MORPHINE SULFATE 2 MG: 2 INJECTION, SOLUTION INTRAMUSCULAR; INTRAVENOUS at 05:35

## 2024-01-14 RX ADMIN — HYDROCODONE BITARTRATE AND ACETAMINOPHEN 1 TABLET: 5; 325 TABLET ORAL at 12:45

## 2024-01-14 NOTE — PLAN OF CARE
Goal Outcome Evaluation:               Pt aox4. Pt has hx of urine retention bladder scanned holding 120 urine. No straight cath but see orders. Pt pain is controlled with PO and IV pain meds. Pt coccyx has small rash, barrier cream applied.Pt up to the chair and BSC. Care ongoing, call light within reach SCD's on pt. Will continue to monitor

## 2024-01-14 NOTE — PLAN OF CARE
Goal Outcome Evaluation:                   Pt is aox4 able to make needs known. Pain is controlled with PO pain meds. Assist x 1 with a walker and gait belt. Urine output is low but not retaining urine on bladder scan. Family contacted awaiting dc and discharge. Call light within reach bed in low position care ongoing . ,

## 2024-01-14 NOTE — CASE MANAGEMENT/SOCIAL WORK
Continued Stay Note   Gabriel     Patient Name: Catalina Moreno  MRN: [id number]  Today's Date: 1/14/2024    Admit Date: 1/10/2024    Plan: home   Discharge Plan       Row Name 01/14/24 1605       Plan    Plan home    Plan Comments cm was notified that the pt no longer desired rehab placement.  per nursing, she had spoken to family who were also in agreement.  attempted to call pt to speak to her without answer.  noted that pt was declined by [facility] due to no pcp.  notified liasion via text also.    Final Discharge Disposition Code 01 - home or self-care    Final Note home      Row Name 01/14/24 1520       Plan    Final Discharge Disposition Code 03 - skilled nursing facility (SNF)    Final Note [facility]                   Discharge Codes    No documentation.                 Expected Discharge Date and Time       Expected Discharge Date Expected Discharge Time    Jan 14, 2024               Renetta Odell RN

## 2024-01-14 NOTE — DISCHARGE SUMMARY
[facility]  Discharge Summary    Date of Service: 24  Patient Name: Catalina Moreno  : 1940  MRN: [id number]    Date of Admission: 1/10/2024  Discharge Diagnosis:   Afib with RVR  Date of Discharge:  24  Primary Care Physician: Anayeli Costa APRN      Presenting Problem:   AF (atrial fibrillation) [I48.91]  Weakness [R53.1]  Atrial fibrillation with RVR [I48.91]  Acute low back pain, unspecified back pain laterality, unspecified whether sciatica present [M54.50]  Chronic heart failure with preserved ejection fraction (HFpEF) [I50.32]    Active and Resolved Hospital Problems:  Active Hospital Problems    Diagnosis POA    **Atrial fibrillation with RVR [I48.91] Yes    Chronic heart failure with preserved ejection fraction (HFpEF) [I50.32] Yes    AF (atrial fibrillation) [I48.91] Yes    Weakness [R53.1] Yes    Atrial flutter with rapid ventricular response [I48.92] Yes    Chronic HFrEF (heart failure with reduced ejection fraction) [I50.22] Yes    Presence of cardiac pacemaker [Z95.0] Yes    Generalized anxiety disorder [F41.1] Yes    Polyneuropathy, unspecified [G62.9] Yes    Restless legs syndrome [G25.81] Yes    Paroxysmal atrial fibrillation [I48.0] Yes    Essential (primary) hypertension [I10] Yes    Pulmonary hypertension, unspecified [I27.20] Yes    Gastro-esophageal reflux disease without esophagitis [K21.9] Yes    Age-related cognitive decline [R41.81] Yes    Coronary artery disease involving native coronary artery of native heart without angina pectoris [I25.10] Yes    Stage 3a chronic kidney disease [N18.31] Yes    Chronic low back pain [M54.50, G89.29] Yes    Depressive disorder [F32.A] Yes    Primary fibromyalgia syndrome [M79.7] Yes    Hypothyroidism [E03.9] Yes    Arthritis [M19.90] Yes      Resolved Hospital Problems    Diagnosis POA    Chronic systolic CHF (congestive heart failure) [I50.22] Yes     Afib with RVR  -TSH, 7.3  - PTT, INR  -EKG  reviewed  -Echo 9/6/23, EF 51 to 55% with moderate aortic valve regurg and calcification  -Cardizem po  -Continuous heart monitor  -Monitor and replace magnesium, keep greater than 2, due to arrthymia  -Monitor and replace other electrolytes  -Consider metoprolol for PRN rate control  -Continue home Eliquis     Back pain/Weakness  -Radiology reviewed. MRI P  -spine consulted input appreciated > MRI has been completed and reviewed demonstrating some worsening of her degenerative lumbar spine since 2020.  She now has a mild degenerative spondylolisthesis at L3-L4 with some foraminal narrowing bilaterally. No severe canal stenosis appreciated.  Uncertain if the spondylolisthesis is  dynamic as flexion-extension imaging was not completed yesterday.   Neurosurgery still does not feel need for any urgent neurosurgical intervention, but would like patient to continue with medical management and recommending addition of some steroid therapy.    Patient is not candidate for any current lumbar injection therapy secondary to her Eliquis use but may be option in the outpatient setting if given permission to hold.    Lumbar flexion and extension imaging pending  Dexamethasone 4 mg Q 12 hr  Pain management per primary  -As needed pain medicine available  -PT/OT     Essential Hypertension, Chronic, Controlled; CHF; CAD with pacemaker in situ  -Continue home medications as appropriate   - Monitor with routine vital signs      Chronic Kidney Disease-Stable  -Monitor and trend labs as appropriate     Hypothyroidism  -Continue levothyroxine when appropriate     Anxiety/Depression; fibromyalgia  -Continue home meds as appropriate    Hospital Course     Hospital Course  Catalinajd Moreno is a 83 y.o. female with PMH of paroxysmal atrial fibrillation, hypertension, CHF, CAD with pacemaker in situ, CKD, hypothyroid, anxiety, depression, and fibromyalgia presented to the hospital for low back pain, and was admitted with a principal  diagnosis of Atrial fibrillation with RVR.  Patient states she has had an increase in her chronic low back pain over the last 1 to 2 days.  She states the pain is generalized does not radiate.  She denies numbness, tingling, bowel or bladder abnormalities.  While in the emergency department patient heart rate converted to atrial fibrillation with RVR rates in the 140s.  Patient was given digoxin based on notes stating she was on this medication in the past.  However, patient states she has not taken this for quite some time.  Patient did not convert with digoxin was given IV push Lopressor still with no conversion and was started on a Cardizem drip.  Patient will be admitted for further evaluation and treatment.     ACP: Patient wishes to be full code with full intervention; she is unsure who will be her decision-maker if she is unable due to the relationship she has with her children.     Patient was seen and examined on 01/11/24 at 02:51 EST .  1/12/24 seen in bed NAD, C/O back pain, MRI P, neurosurgery following. Pt/ot ordered  1/13/24 patient seen and examined in bed no acute distress, vital signs stable, pain better controlled.  Discussed with RN. some Urinary retention overnight.  1/14/24 seen today in bed NAD, bp stable , neurosurgery signed off MRI show multilevel spondylitic changes as well as a degenerative spondylolisthesis at L3-L4 felt likely to be cause of patient's back pain.  flexion-extension imaging showed significant stability noted.  Neurosurgery recommended to continue with medical management/injection therapy in the outpatient setting for now.  She is in agreement and is not in favor of any surgical intervention.       Will dc to home, condition on dc stable    DISCHARGE Follow Up Recommendations for labs and diagnostics: follow with pcp in one week      Reasons For Change In Medications and Indications for New Medications:      Day of Discharge     Vital Signs:  Temp:  [97.7 °F (36.5  °C)-98.4 °F (36.9 °C)] 98.4 °F (36.9 °C)  Heart Rate:  [] 106  Resp:  [14-17] 17  BP: (104-143)/(52-70) 143/70  Flow (L/min):  [2] 2    Physical Exam Constitutional:       Appearance: Normal appearance.   HENT:      Head: Normocephalic.      Nose: Nose normal.      Mouth/Throat:      Mouth: Mucous membranes are moist.      Pharynx: Oropharynx is clear.   Eyes:      Pupils: Pupils are equal, round, and reactive to light.   Cardiovascular:      Rate and Rhythm: Tachycardia present. Rhythm irregular.      Pulses: Normal pulses.      Heart sounds: Normal heart sounds.   Pulmonary:      Effort: Pulmonary effort is normal.      Breath sounds: Normal breath sounds.   Abdominal:      General: Bowel sounds are normal.      Palpations: Abdomen is soft.   Musculoskeletal:         General: Normal range of motion.      Cervical back: Normal range of motion.   Skin:     General: Skin is warm and dry.      Capillary Refill: Capillary refill takes 2 to 3 seconds.   Neurological:      General: No focal deficit present.      Mental Status: She is alert and oriented to person, place, and time. Mental status is at baseline.   Psychiatric:         Mood and Affect: Mood normal.         Behavior: Behavior normal.         Thought Content: Thought content normal.         Judgment: Judgment normal.      Scheduled Meds       Pertinent  and/or Most Recent Results     LAB RESULTS:      Lab 01/12/24  0400 01/11/24  0638 01/11/24  0257 01/10/24  2254   WBC 5.70 7.60 7.60 7.40   HEMOGLOBIN 11.3* 11.7* 12.1 11.9*   HEMATOCRIT 35.4 36.6 38.0 36.8   PLATELETS 266 271 271 272   NEUTROS ABS 3.40 4.50 4.30 4.80   LYMPHS ABS 1.50 2.20 2.60 1.90   MONOS ABS 0.50 0.70 0.60 0.50   EOS ABS 0.20 0.10 0.20 0.10   MCV 88.5 88.8 88.1 87.4   PROTIME  --  11.1  --   --          Lab 01/12/24  0400 01/11/24  0638 01/11/24  0257 01/10/24  2254   SODIUM 140 140 141 141   POTASSIUM 4.4 3.9 4.3 3.9   CHLORIDE 105 103 105 104   CO2 27.0 27.0 26.0 25.0   ANION GAP  8.0 10.0 10.0 12.0   BUN 22 18 18 18   CREATININE 0.83 0.80 0.82 0.79   EGFR 70.5 73.7 71.5 74.8   GLUCOSE 126* 96 100* 105*   CALCIUM 8.8 9.1 9.4 9.1   IONIZED CALCIUM  --   --  1.22  --    MAGNESIUM 2.3 2.4 2.0 2.0   TSH  --   --   --  7.380*             Lab 01/11/24  0638 01/11/24  0257 01/10/24  2254   PROBNP  --   --  1,563.0   HSTROP T  --  13 11   PROTIME 11.1  --   --    INR 1.02  --   --                  Brief Urine Lab Results  (Last result in the past 365 days)        Color   Clarity   Blood   Leuk Est   Nitrite   Protein   CREAT   Urine HCG        01/10/24 2246 Yellow   Clear   Negative   Negative   Negative   Negative                 Microbiology Results (last 10 days)       ** No results found for the last 240 hours. **            XR Spine Lumbar Complete With Flex & Ext    Result Date: 1/14/2024  Impression: Degenerative changes. No change in bony alignment with flexion and extension. Electronically Signed: Brad Myles MD  1/14/2024 12:16 PM EST  Workstation ID: [id number]    MRI Lumbar Spine Without Contrast    Result Date: 1/12/2024  Impression: Impression: 1. Multilevel degenerative changes. The most prominent findings are at L3/4 where there is moderate to severe bilateral neural foramina stenosis with suspected compromise of the exiting L3 nerve roots. Electronically Signed: Meghann Catalan MD  1/12/2024 1:58 PM CST  Workstation ID: [id number]    XR Spine Lumbar 2 or 3 View    Result Date: 1/10/2024  Impression: Impression: No acute osseous abnormality. Moderate multilevel degenerative changes are present throughout the spine, most pronounced at L4-L5 and L5-S1. Spondylolisthesis at L3-L4 is related to degenerative change and stable. Electronically Signed: Marjorie Moyer MD  1/10/2024 11:27 PM EST  Workstation ID: [id number]     Results for orders placed during the hospital encounter of 03/04/22    Duplex Venous Lower Extremity - Bilateral CAR    Interpretation Summary  · Normal bilateral lower  extremity venous duplex scan.  · Nonvascular cystic mass located in the left posterior mid calf.      Results for orders placed during the hospital encounter of 03/04/22    Duplex Venous Lower Extremity - Bilateral CAR    Interpretation Summary  · Normal bilateral lower extremity venous duplex scan.  · Nonvascular cystic mass located in the left posterior mid calf.      Results for orders placed during the hospital encounter of 09/05/23    Adult Transthoracic Echo Complete W/ Cont if Necessary Per Protocol    Interpretation Summary    Left ventricular ejection fraction appears to be 51 - 55%.    The right ventricular cavity is moderately dilated.    The left atrial cavity is moderately dilated.    The right atrial cavity is mildly  dilated.    There is calcification of the aortic valve.    Moderate aortic valve regurgitation is present.    Estimated right ventricular systolic pressure from tricuspid regurgitation is mildly elevated (35-45 mmHg).      Labs Pending at Discharge:      Procedures Performed           Consults:   Consults       Date and Time Order Name Status Description    1/12/2024 10:50 AM Inpatient Spine Surgery Consult      1/11/2024 11:55 AM Inpatient Spine Surgery Consult Completed     1/11/2024  1:00 AM Hospitalist (on-call MD unless specified)                Discharge Details        Discharge Medications        New Medications        Instructions Start Date   dexAMETHasone 1.5 MG tablet  Commonly known as: DECADRON   3 mg, Oral, Every 12 Hours Scheduled      dilTIAZem  MG 24 hr capsule  Commonly known as: CARDIZEM CD   240 mg, Oral, Every 24 Hours Scheduled   Start Date: January 15, 2024     HYDROcodone-acetaminophen 5-325 MG per tablet  Commonly known as: NORCO   1 tablet, Oral, Every 8 Hours PRN      lidocaine 5 %  Commonly known as: LIDODERM   2 patches, Transdermal, Every 24 Hours Scheduled, Remove & Discard patch within 12 hours or as directed by MD   Start Date: January 15, 2024      sennosides-docusate 8.6-50 MG per tablet  Commonly known as: PERICOLACE   1 tablet, Oral, Daily             Changes to Medications        Instructions Start Date   metoprolol succinate XL 25 MG 24 hr tablet  Commonly known as: TOPROL-XL  What changed: how much to take   12.5 mg, Oral, Every 24 Hours             Continue These Medications        Instructions Start Date   apixaban 5 MG tablet tablet  Commonly known as: ELIQUIS   5 mg, Oral, Every 12 Hours Scheduled      atorvastatin 10 MG tablet  Commonly known as: LIPITOR   10 mg, Oral, Daily      Entresto 24-26 MG tablet  Generic drug: sacubitril-valsartan   1 tablet, Oral, 2 Times Daily      rOPINIRole 0.25 MG tablet  Commonly known as: REQUIP   0.25 mg, Oral, Nightly, Take 1 hour before bedtime.               Allergies   Allergen Reactions    Baclofen Rash    Codeine Nausea Only    Ibuprofen Unknown (See Comments)     Patient doesn't know----Motin    Methocarbamol Unknown (See Comments)     Patient doesn't know    Naproxen Unknown (See Comments)     Pt. Doesn't know     Tizanidine Hcl Other (See Comments)     Syncope     Tolmetin Dizziness     Pt. Doesn't know-- same as Tolectin         Discharge Disposition:   Skilled Nursing Facility (DC - External)    Diet:  Hospital:  Diet Order   Procedures    Diet: Regular/House Diet; Texture: Regular Texture (IDDSI 7); Fluid Consistency: Thin (IDDSI 0)         Discharge Activity:   Activity Instructions       Gradually Increase Activity Until at Pre-Hospitalization Level                CODE STATUS:  Code Status and Medical Interventions:   Ordered at: 01/11/24 0210     Code Status (Patient has no pulse and is not breathing):    CPR (Attempt to Resuscitate)     Medical Interventions (Patient has pulse or is breathing):    Full Support         No future appointments.    Additional Instructions for the Follow-ups that You Need to Schedule       Discharge Follow-up with PCP   As directed       Currently Documented PCP:     Anayeli Costa APRN    PCP Phone Number:    [phone]     Follow Up Details: 1 week                Time spent on Discharge including face to face service:  >30 minutes    Signature: Electronically signed by Irving Oh MD, 01/14/24, 16:13 EST.  [facility]

## 2024-01-14 NOTE — PROGRESS NOTES
NEUROSURGERY PROGRESS NOTE     LOS: 3 days   Patient Care Team:  Anayeli Costa APRN as PCP - General (Family Medicine)  Flash Gonzalez MD as Consulting Physician (Cardiology)    Chief Complaint:    Chief Complaint   Patient presents with    Back Pain    Leg pain/weakness    Subjective     Interval History: NAEO.  Patient reports improved back pain and ease of turning in bed.  She reports some lower Soreness but no radiating hip or leg pain.    History taken from: patient chart    Objective      Vital Signs  Temp:  [97.7 °F (36.5 °C)-98.4 °F (36.9 °C)] 97.7 °F (36.5 °C)  Heart Rate:  [71-78] 71  Resp:  [14-17] 14  BP: (104-126)/(47-69) 126/69  Body mass index is 29.02 kg/m².    Intake/Output last 3 shifts:  I/O last 3 completed shifts:  In: 2070 [P.O.:1320; I.V.:750]  Out: 1050 [Urine:1050]    Intake/Output this shift:  No intake/output data recorded.    Physical    GENERAL: No acute distress. Appears well nourished. Appears stated age.  HEENT: Atraumatic and normocephalic  CARDIO: RRR on monitoring. Pulses 2+  PULM: breathing nonlabored on room air  NEURO: AAOx3. EOMI. PERRL. CNi. Strength symmetric in lower extremities with some proximal over distal weakness. sensation intact to light touch. Reflexes 2+ throughout.        Results Review:      Labs:    Lab Results (last 24 hours)       ** No results found for the last 24 hours. **            Imaging:    MRI lumbar spine    Imaging demonstrates some worsening degenerative disc and degenerative joint changes mainly noted at L3-L4 where there is also a mild spondylolisthesis.  This is when compared to previous 2020 imaging.  Moderate canal stenosis and foraminal stenosis that looks to contact/affect the bilateral L3 nerve roots.          Current Medications:   Scheduled Meds:apixaban, 5 mg, Oral, Q12H  dexAMETHasone, 3 mg, Oral, Q12H  dilTIAZem CD, 240 mg, Oral, Q24H  lidocaine, 2 patch, Transdermal, Q24H  pantoprazole, 40 mg, Intravenous, Q AM  senna-docusate  sodium, 2 tablet, Oral, BID  sodium chloride, 10 mL, Intravenous, Q12H      Continuous Infusions:     Assessment & Plan       Atrial fibrillation with RVR    Arthritis    Depressive disorder    Primary fibromyalgia syndrome    Hypothyroidism    Stage 3a chronic kidney disease    Chronic low back pain    Age-related cognitive decline    Generalized anxiety disorder    Polyneuropathy, unspecified    Restless legs syndrome    Paroxysmal atrial fibrillation    Essential (primary) hypertension    Gastro-esophageal reflux disease without esophagitis    Pulmonary hypertension, unspecified    Coronary artery disease involving native coronary artery of native heart without angina pectoris    Presence of cardiac pacemaker    Chronic HFrEF (heart failure with reduced ejection fraction)    Atrial flutter with rapid ventricular response    Weakness    AF (atrial fibrillation)      Assessment/Plan:  Catalina Moreno is a 83 y.o. female with history of chronic degenerative spondylolisthesis that is being followed for her reported worsening back and leg pain.  Back pain is better and she reports no dermatomal leg pain today.  MRI again does show multilevel spondylitic changes as well as a degenerative spondylolisthesis at L3-L4 felt likely to be cause of patient's back pain.  Fortunately flexion-extension imaging showed significant stability noted.  Patient should continue with medical management/injection therapy in the outpatient setting for now.  She is in agreement and is not in favor of any surgical intervention.  She is welcome to follow-up in the outpatient clinic should her symptoms worsen.  Neurosurgery will sign off at this time.     Plan:    Recommend continued medical management  Neurosurgery will sign off    I discussed findings with patient, Dr. Kadi Gamble, APRN  01/14/24  09:21 EST

## 2024-01-14 NOTE — CASE MANAGEMENT/SOCIAL WORK
Case Management Discharge Note      Final Note: home         Selected Continued Care - Admitted Since 1/10/2024       Destination Coordination complete.      Service Provider Selected Services Address Phone Fax Patient Preferred    [facility] [address] [place] [address] [place] [id number] [phone] --                 Transportation Services  Private: Car    Final Discharge Disposition Code: 01 - home or self-care

## 2024-01-14 NOTE — PLAN OF CARE
Goal Outcome Evaluation:      Patient alert oriented able to make needs known. Pain controlled with po pain medication. Patient on oxygen 2l/NC. Up to bedside commode with one assist. Voiding with no difficulty. Patient able to turn and reposition self. Bed in low position, call light in reach, room near nursing station.

## 2024-01-14 NOTE — PROGRESS NOTES
[facility] MEDICINE SERVICE  DAILY PROGRESS NOTE    NAME: Catalina Moreno  : 1940  MRN: [id number]      LOS: 3 days     PROVIDER OF SERVICE: Irving Oh MD    Chief Complaint: Atrial fibrillation with RVR    Subjective:     Interval History:  History taken from: patient  Patient Complaints: back pain    Catalina Moreno is a 83 y.o. female with PMH of paroxysmal atrial fibrillation, hypertension, CHF, CAD with pacemaker in situ, CKD, hypothyroid, anxiety, depression, and fibromyalgia presented to the hospital for low back pain, and was admitted with a principal diagnosis of Atrial fibrillation with RVR.  Patient states she has had an increase in her chronic low back pain over the last 1 to 2 days.  She states the pain is generalized does not radiate.  She denies numbness, tingling, bowel or bladder abnormalities.  While in the emergency department patient heart rate converted to atrial fibrillation with RVR rates in the 140s.  Patient was given digoxin based on notes stating she was on this medication in the past.  However, patient states she has not taken this for quite some time.  Patient did not convert with digoxin was given IV push Lopressor still with no conversion and was started on a Cardizem drip.  Patient will be admitted for further evaluation and treatment.     ACP: Patient wishes to be full code with full intervention; she is unsure who will be her decision-maker if she is unable due to the relationship she has with her children.     Patient was seen and examined on 24 at 02:51 EST .  24 seen in bed NAD, C/O back pain, MRI P, neurosurgery following. Pt/ot ordered  24 patient seen and examined in bed no acute distress, vital signs stable, pain better controlled.  Discussed with RN. some Urinary retention overnight.  24 seen today in bed NAD, bp stable , neurosurgery signed off MRI show multilevel spondylitic changes as well as a degenerative  spondylolisthesis at L3-L4 felt likely to be cause of patient's back pain.  flexion-extension imaging showed significant stability noted.  Neurosurgery recommended to continue with medical management/injection therapy in the outpatient setting for now.  She is in agreement and is not in favor of any surgical intervention.        Subjective / Review of systems       Review of Systems  Constitutional:  Negative for diaphoresis and fatigue.   HENT:  Negative for congestion and sore throat.    Eyes:  Negative for pain and redness.   Respiratory:  Negative for cough and shortness of breath.    Cardiovascular:  Negative for chest pain and leg swelling.   Gastrointestinal:  Negative for abdominal pain, nausea and vomiting.   Endocrine: Negative for polydipsia and polyphagia.   Genitourinary:  Negative for dysuria and flank pain.   Musculoskeletal:  Positive for back pain. Negative for joint swelling.   Skin:  Negative for color change and pallor.   Neurological:  Positive for weakness. Negative for dizziness and seizures.   Psychiatric/Behavioral:  Negative for behavioral problems and confusion.         Objective:     Vital Signs  Temp:  [97.7 °F (36.5 °C)-98.4 °F (36.9 °C)] 98.4 °F (36.9 °C)  Heart Rate:  [] 106  Resp:  [14-17] 17  BP: (104-143)/(52-70) 143/70  Flow (L/min):  [2] 2   Body mass index is 29.02 kg/m².    Physical ExamConstitutional:       Appearance: Normal appearance.   HENT:      Head: Normocephalic.      Nose: Nose normal.      Mouth/Throat:      Mouth: Mucous membranes are moist.      Pharynx: Oropharynx is clear.   Eyes:      Pupils: Pupils are equal, round, and reactive to light.   Cardiovascular:      Rate and Rhythm: Tachycardia present. Rhythm irregular.      Pulses: Normal pulses.      Heart sounds: Normal heart sounds.   Pulmonary:      Effort: Pulmonary effort is normal.      Breath sounds: Normal breath sounds.   Abdominal:      General: Bowel sounds are normal.      Palpations: Abdomen is  soft.   Musculoskeletal:         General: Normal range of motion.      Cervical back: Normal range of motion.   Skin:     General: Skin is warm and dry.      Capillary Refill: Capillary refill takes 2 to 3 seconds.   Neurological:      General: No focal deficit present.      Mental Status: She is alert and oriented to person, place, and time. Mental status is at baseline.   Psychiatric:         Mood and Affect: Mood normal.         Behavior: Behavior normal.         Thought Content: Thought content normal.         Judgment: Judgment normal.     Scheduled Meds   apixaban, 5 mg, Oral, Q12H  dexAMETHasone, 3 mg, Oral, Q12H  dilTIAZem CD, 240 mg, Oral, Q24H  lidocaine, 2 patch, Transdermal, Q24H  pantoprazole, 40 mg, Intravenous, Q AM  senna-docusate sodium, 2 tablet, Oral, BID  sodium chloride, 10 mL, Intravenous, Q12H       PRN Meds     acetaminophen **OR** acetaminophen **OR** acetaminophen    benzocaine-menthol    senna-docusate sodium **AND** polyethylene glycol **AND** bisacodyl **AND** bisacodyl    Calcium Replacement - Follow Nurse / BPA Driven Protocol    HYDROcodone-acetaminophen    ketamine (KETALAR) 22.5 mg in sodium chloride 0.9 % 100 mL infusion **AND** Ketamine Vital Signs & Assessment    LORazepam    Magnesium Low Dose Replacement - Follow Nurse / BPA Driven Protocol    melatonin    Morphine    nitroglycerin    ondansetron ODT **OR** ondansetron    Phosphorus Replacement - Follow Nurse / BPA Driven Protocol    Potassium Replacement - Follow Nurse / BPA Driven Protocol    [COMPLETED] Insert Peripheral IV **AND** sodium chloride    sodium chloride    sodium chloride   Infusions         Diagnostic Data    Results from last 7 days   Lab Units 01/12/24  0400   WBC 10*3/mm3 5.70   HEMOGLOBIN g/dL 11.3*   HEMATOCRIT % 35.4   PLATELETS 10*3/mm3 266   GLUCOSE mg/dL 126*   CREATININE mg/dL 0.83   BUN mg/dL 22   SODIUM mmol/L 140   POTASSIUM mmol/L 4.4   ANION GAP mmol/L 8.0       XR Spine Lumbar Complete With Flex  & Ext    Result Date: 1/14/2024  Degenerative changes. No change in bony alignment with flexion and extension. Electronically Signed: Brad Myles MD  1/14/2024 12:16 PM EST  Workstation ID: [id number]    MRI Lumbar Spine Without Contrast    Result Date: 1/12/2024  Impression: 1. Multilevel degenerative changes. The most prominent findings are at L3/4 where there is moderate to severe bilateral neural foramina stenosis with suspected compromise of the exiting L3 nerve roots. Electronically Signed: Meghann Catalan MD  1/12/2024 1:58 PM CST  Workstation ID: [id number]       I reviewed the patient's new clinical results.    Assessment/Plan:     Active and Resolved Problems  Active Hospital Problems    Diagnosis  POA    **Atrial fibrillation with RVR [I48.91]  Yes    AF (atrial fibrillation) [I48.91]  Yes    Weakness [R53.1]  Yes    Atrial flutter with rapid ventricular response [I48.92]  Yes    Chronic HFrEF (heart failure with reduced ejection fraction) [I50.22]  Yes    Presence of cardiac pacemaker [Z95.0]  Yes    Generalized anxiety disorder [F41.1]  Yes    Polyneuropathy, unspecified [G62.9]  Yes    Restless legs syndrome [G25.81]  Yes    Paroxysmal atrial fibrillation [I48.0]  Yes    Essential (primary) hypertension [I10]  Yes    Pulmonary hypertension, unspecified [I27.20]  Yes    Gastro-esophageal reflux disease without esophagitis [K21.9]  Yes    Age-related cognitive decline [R41.81]  Yes    Coronary artery disease involving native coronary artery of native heart without angina pectoris [I25.10]  Yes    Stage 3a chronic kidney disease [N18.31]  Yes    Chronic low back pain [M54.50, G89.29]  Yes    Depressive disorder [F32.A]  Yes    Primary fibromyalgia syndrome [M79.7]  Yes    Hypothyroidism [E03.9]  Yes    Arthritis [M19.90]  Yes      Resolved Hospital Problems    Diagnosis Date Resolved POA    Chronic systolic CHF (congestive heart failure) [I50.22] 01/11/2024 Yes       Afib with RVR  -TSH, 7.3  - PTT,  INR  -EKG reviewed  -Echo 9/6/23, EF 51 to 55% with moderate aortic valve regurg and calcification  -Cardizem po  -Continuous heart monitor  -Monitor and replace magnesium, keep greater than 2, due to arrthymia  -Monitor and replace other electrolytes  -Consider metoprolol for PRN rate control  -Continue home Eliquis     Back pain/Weakness  -Radiology reviewed. MRI P  -spine consulted input appreciated > MRI has been completed and reviewed demonstrating some worsening of her degenerative lumbar spine since 2020.  She now has a mild degenerative spondylolisthesis at L3-L4 with some foraminal narrowing bilaterally. No severe canal stenosis appreciated.  Uncertain if the spondylolisthesis is  dynamic as flexion-extension imaging was not completed yesterday.   Neurosurgery still does not feel need for any urgent neurosurgical intervention, but would like patient to continue with medical management and recommending addition of some steroid therapy.    Patient is not candidate for any current lumbar injection therapy secondary to her Eliquis use but may be option in the outpatient setting if given permission to hold.    Lumbar flexion and extension imaging pending  Dexamethasone 4 mg Q 12 hr  Pain management per primary  -As needed pain medicine available  -PT/OT     Essential Hypertension, Chronic, Controlled; CHF; CAD with pacemaker in situ  -Continue home medications as appropriate   - Monitor with routine vital signs      Chronic Kidney Disease-Stable  -Monitor and trend labs as appropriate     Hypothyroidism  -Continue levothyroxine when appropriate     Anxiety/Depression; fibromyalgia  -Continue home meds as appropriate      Code Status (Patient has no pulse and is not breathing): CPR (Attempt to Resuscitate)  Medical Interventions (Patient has pulse or is breathing): Full Support        Nutrition:   NPO Diet NPO Type: Strict NPO     DVT prophylaxis:  Medical and mechanical DVT prophylaxis orders are present.      Code status is   Code Status and Medical Interventions:   Ordered at: 01/11/24 0210     Code Status (Patient has no pulse and is not breathing):    CPR (Attempt to Resuscitate)     Medical Interventions (Patient has pulse or is breathing):    Full Support       Plan for disposition:nh in 2 days    Time: 30 minutes    Signature: Electronically signed by Irving Oh MD, 01/14/24, 13:06 EST.  [facility] Hospitalist Team

## 2024-01-14 NOTE — CASE MANAGEMENT/SOCIAL WORK
Case Management Discharge Note      Final Note: [facility]         Selected Continued Care - Admitted Since 1/10/2024       Destination Coordination complete.      Service Provider Selected Services Address Phone Fax Patient Preferred    [facility] [address] [place] [address] [place] [id number] [phone] --                 Transportation Services  Private: Car    Final Discharge Disposition Code: 03 - skilled nursing facility (SNF)

## 2024-03-21 ENCOUNTER — HOSPITAL ENCOUNTER (INPATIENT)
Facility: HOSPITAL | Age: 84
LOS: 4 days | Discharge: HOME | End: 2024-03-26
Attending: EMERGENCY MEDICINE
Payer: MEDICARE

## 2024-03-21 ENCOUNTER — APPOINTMENT (OUTPATIENT)
Dept: GENERAL RADIOLOGY | Facility: HOSPITAL | Age: 84
End: 2024-03-21
Payer: MEDICARE

## 2024-03-21 ENCOUNTER — APPOINTMENT (OUTPATIENT)
Dept: CT IMAGING | Facility: HOSPITAL | Age: 84
End: 2024-03-21
Payer: MEDICARE

## 2024-03-21 DIAGNOSIS — S09.90XA INJURY OF HEAD, INITIAL ENCOUNTER: Primary | ICD-10-CM

## 2024-03-21 DIAGNOSIS — I48.91 ATRIAL FIBRILLATION WITH RVR: ICD-10-CM

## 2024-03-21 DIAGNOSIS — S02.2XXB OPEN FRACTURE OF NASAL BONE, INITIAL ENCOUNTER: ICD-10-CM

## 2024-03-21 DIAGNOSIS — S93.402A SPRAIN OF LEFT ANKLE, UNSPECIFIED LIGAMENT, INITIAL ENCOUNTER: ICD-10-CM
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
## 2024-03-21 PROBLEM — S02.2XXA NASAL BONES, CLOSED FRACTURE: Status: ACTIVE | Noted: 2024-03-21

## 2024-03-21 LAB
ALBUMIN SERPL-MCNC: 4.5 G/DL (ref 3.5–5.2)
ALBUMIN/GLOB SERPL: 1.5 G/DL
ALP SERPL-CCNC: 90 U/L (ref 39–117)
ALT SERPL W P-5'-P-CCNC: 7 U/L (ref 1–33)
ANION GAP SERPL CALCULATED.3IONS-SCNC: 15 MMOL/L (ref 5–15)
APTT PPP: 26.8 SECONDS (ref 61–76.5)
AST SERPL-CCNC: 16 U/L (ref 1–32)
BASOPHILS # BLD AUTO: 0.09 10*3/MM3 (ref 0–0.2)
BASOPHILS NFR BLD AUTO: 0.9 % (ref 0–1.5)
BILIRUB SERPL-MCNC: 1.2 MG/DL (ref 0–1.2)
BUN SERPL-MCNC: 18 MG/DL (ref 8–23)
BUN/CREAT SERPL: 20.5 (ref 7–25)
CALCIUM SPEC-SCNC: 9.6 MG/DL (ref 8.6–10.5)
CHLORIDE SERPL-SCNC: 102 MMOL/L (ref 98–107)
CO2 SERPL-SCNC: 24 MMOL/L (ref 22–29)
CREAT SERPL-MCNC: 0.88 MG/DL (ref 0.57–1)
DEPRECATED RDW RBC AUTO: 51.1 FL (ref 37–54)
EGFRCR SERPLBLD CKD-EPI 2021: 65.7 ML/MIN/1.73
EOSINOPHIL # BLD AUTO: 0.08 10*3/MM3 (ref 0–0.4)
EOSINOPHIL NFR BLD AUTO: 0.8 % (ref 0.3–6.2)
ERYTHROCYTE [DISTWIDTH] IN BLOOD BY AUTOMATED COUNT: 15.9 % (ref 12.3–15.4)
GEN 5 2HR TROPONIN T REFLEX: 11 NG/L
GLOBULIN UR ELPH-MCNC: 3.1 GM/DL
GLUCOSE SERPL-MCNC: 109 MG/DL (ref 65–99)
HCT VFR BLD AUTO: 38.2 % (ref 34–46.6)
HGB BLD-MCNC: 11.8 G/DL (ref 12–15.9)
IMM GRANULOCYTES # BLD AUTO: 0.04 10*3/MM3 (ref 0–0.05)
IMM GRANULOCYTES NFR BLD AUTO: 0.4 % (ref 0–0.5)
INR PPP: 1.04 (ref 0.93–1.1)
LYMPHOCYTES # BLD AUTO: 1.72 10*3/MM3 (ref 0.7–3.1)
LYMPHOCYTES NFR BLD AUTO: 16.5 % (ref 19.6–45.3)
MAGNESIUM SERPL-MCNC: 2 MG/DL (ref 1.6–2.4)
MCH RBC QN AUTO: 27.1 PG (ref 26.6–33)
MCHC RBC AUTO-ENTMCNC: 30.9 G/DL (ref 31.5–35.7)
MCV RBC AUTO: 87.6 FL (ref 79–97)
MONOCYTES # BLD AUTO: 0.64 10*3/MM3 (ref 0.1–0.9)
MONOCYTES NFR BLD AUTO: 6.1 % (ref 5–12)
NEUTROPHILS NFR BLD AUTO: 7.86 10*3/MM3 (ref 1.7–7)
NEUTROPHILS NFR BLD AUTO: 75.3 % (ref 42.7–76)
NRBC BLD AUTO-RTO: 0 /100 WBC (ref 0–0.2)
PLATELET # BLD AUTO: 276 10*3/MM3 (ref 140–450)
PMV BLD AUTO: 10.4 FL (ref 6–12)
POTASSIUM SERPL-SCNC: 4.3 MMOL/L (ref 3.5–5.2)
PROT SERPL-MCNC: 7.6 G/DL (ref 6–8.5)
PROTHROMBIN TIME: 11.3 SECONDS (ref 9.6–11.7)
RBC # BLD AUTO: 4.36 10*6/MM3 (ref 3.77–5.28)
SODIUM SERPL-SCNC: 141 MMOL/L (ref 136–145)
TROPONIN T DELTA: -1 NG/L
TROPONIN T SERPL HS-MCNC: 12 NG/L
WBC NRBC COR # BLD AUTO: 10.43 10*3/MM3 (ref 3.4–10.8)

## 2024-03-21 PROCEDURE — 80053 COMPREHEN METABOLIC PANEL: CPT | Performed by: EMERGENCY MEDICINE

## 2024-03-21 PROCEDURE — 85730 THROMBOPLASTIN TIME PARTIAL: CPT | Performed by: EMERGENCY MEDICINE

## 2024-03-21 PROCEDURE — 70450 CT HEAD/BRAIN W/O DYE: CPT

## 2024-03-21 PROCEDURE — 25010000002 TETANUS-DIPHTH-ACELL PERTUSSIS 5-2.5-18.5 LF-MCG/0.5 SUSPENSION PREFILLED SYRINGE: Performed by: EMERGENCY MEDICINE

## 2024-03-21 PROCEDURE — 72110 X-RAY EXAM L-2 SPINE 4/>VWS: CPT

## 2024-03-21 PROCEDURE — 85025 COMPLETE CBC W/AUTO DIFF WBC: CPT | Performed by: EMERGENCY MEDICINE

## 2024-03-21 PROCEDURE — 90715 TDAP VACCINE 7 YRS/> IM: CPT | Performed by: EMERGENCY MEDICINE

## 2024-03-21 PROCEDURE — 71045 X-RAY EXAM CHEST 1 VIEW: CPT

## 2024-03-21 PROCEDURE — 0HQ1XZZ REPAIR FACE SKIN, EXTERNAL APPROACH: ICD-10-PCS | Performed by: EMERGENCY MEDICINE

## 2024-03-21 PROCEDURE — 73630 X-RAY EXAM OF FOOT: CPT

## 2024-03-21 PROCEDURE — 90471 IMMUNIZATION ADMIN: CPT | Performed by: EMERGENCY MEDICINE

## 2024-03-21 PROCEDURE — G0378 HOSPITAL OBSERVATION PER HR: HCPCS

## 2024-03-21 PROCEDURE — 73610 X-RAY EXAM OF ANKLE: CPT

## 2024-03-21 PROCEDURE — 84484 ASSAY OF TROPONIN QUANT: CPT | Performed by: EMERGENCY MEDICINE

## 2024-03-21 PROCEDURE — 83735 ASSAY OF MAGNESIUM: CPT | Performed by: EMERGENCY MEDICINE

## 2024-03-21 PROCEDURE — 93005 ELECTROCARDIOGRAM TRACING: CPT | Performed by: EMERGENCY MEDICINE

## 2024-03-21 PROCEDURE — 25010000002 CEFAZOLIN PER 500 MG: Performed by: EMERGENCY MEDICINE

## 2024-03-21 PROCEDURE — 25010000002 LIDOCAINE 1 % SOLUTION: Performed by: EMERGENCY MEDICINE

## 2024-03-21 PROCEDURE — 36415 COLL VENOUS BLD VENIPUNCTURE: CPT

## 2024-03-21 PROCEDURE — 72125 CT NECK SPINE W/O DYE: CPT

## 2024-03-21 PROCEDURE — 85610 PROTHROMBIN TIME: CPT | Performed by: EMERGENCY MEDICINE

## 2024-03-21 PROCEDURE — 72170 X-RAY EXAM OF PELVIS: CPT

## 2024-03-21 PROCEDURE — 99285 EMERGENCY DEPT VISIT HI MDM: CPT

## 2024-03-21 PROCEDURE — 70486 CT MAXILLOFACIAL W/O DYE: CPT

## 2024-03-21 RX ORDER — CHOLECALCIFEROL (VITAMIN D3) 125 MCG
5 CAPSULE ORAL NIGHTLY PRN
Status: DISCONTINUED | OUTPATIENT
Start: 2024-03-21 | End: 2024-03-26 | Stop reason: HOSPADM

## 2024-03-21 RX ORDER — METOPROLOL SUCCINATE 25 MG/1
25 TABLET, EXTENDED RELEASE ORAL DAILY
COMMUNITY

## 2024-03-21 RX ORDER — SODIUM CHLORIDE 0.9 % (FLUSH) 0.9 %
10 SYRINGE (ML) INJECTION AS NEEDED
Status: DISCONTINUED | OUTPATIENT
Start: 2024-03-21 | End: 2024-03-26 | Stop reason: HOSPADM

## 2024-03-21 RX ORDER — HYDROCODONE BITARTRATE AND ACETAMINOPHEN 7.5; 325 MG/1; MG/1
1 TABLET ORAL ONCE
Status: COMPLETED | OUTPATIENT
Start: 2024-03-21 | End: 2024-03-21

## 2024-03-21 RX ORDER — ROPINIROLE 0.25 MG/1
0.25 TABLET, FILM COATED ORAL NIGHTLY
Status: DISCONTINUED | OUTPATIENT
Start: 2024-03-21 | End: 2024-03-26 | Stop reason: HOSPADM

## 2024-03-21 RX ORDER — ATORVASTATIN CALCIUM 10 MG/1
10 TABLET, FILM COATED ORAL DAILY
Status: DISCONTINUED | OUTPATIENT
Start: 2024-03-22 | End: 2024-03-26 | Stop reason: HOSPADM

## 2024-03-21 RX ORDER — LIDOCAINE 4 G/G
2 PATCH TOPICAL
Status: DISCONTINUED | OUTPATIENT
Start: 2024-03-22 | End: 2024-03-26 | Stop reason: HOSPADM

## 2024-03-21 RX ORDER — LIDOCAINE HYDROCHLORIDE 10 MG/ML
10 INJECTION, SOLUTION INFILTRATION; PERINEURAL ONCE
Status: COMPLETED | OUTPATIENT
Start: 2024-03-21 | End: 2024-03-21

## 2024-03-21 RX ORDER — HYDROCODONE BITARTRATE AND ACETAMINOPHEN 7.5; 325 MG/1; MG/1
1 TABLET ORAL EVERY 4 HOURS PRN
Status: DISCONTINUED | OUTPATIENT
Start: 2024-03-21 | End: 2024-03-26 | Stop reason: HOSPADM

## 2024-03-21 RX ORDER — DEXAMETHASONE 1.5 MG/1
1.5 TABLET ORAL DAILY
Status: ON HOLD | COMMUNITY
End: 2024-03-22

## 2024-03-21 RX ORDER — DILTIAZEM HCL/D5W 125 MG/125
5-15 PLASTIC BAG, INJECTION (ML) INTRAVENOUS CONTINUOUS
Status: DISCONTINUED | OUTPATIENT
Start: 2024-03-21 | End: 2024-03-22

## 2024-03-21 RX ORDER — METOPROLOL SUCCINATE 25 MG/1
25 TABLET, EXTENDED RELEASE ORAL DAILY
Status: DISCONTINUED | OUTPATIENT
Start: 2024-03-22 | End: 2024-03-24

## 2024-03-21 RX ORDER — AMOXICILLIN 250 MG
1 CAPSULE ORAL DAILY
Status: DISCONTINUED | OUTPATIENT
Start: 2024-03-22 | End: 2024-03-26 | Stop reason: HOSPADM

## 2024-03-21 RX ADMIN — ROPINIROLE HYDROCHLORIDE 0.25 MG: 0.25 TABLET, FILM COATED ORAL at 21:57

## 2024-03-21 RX ADMIN — Medication 5 MG/HR: at 17:37

## 2024-03-21 RX ADMIN — CEFAZOLIN 1000 MG: 1 INJECTION, POWDER, FOR SOLUTION INTRAMUSCULAR; INTRAVENOUS at 18:42

## 2024-03-21 RX ADMIN — APIXABAN 5 MG: 5 TABLET, FILM COATED ORAL at 21:57

## 2024-03-21 RX ADMIN — LIDOCAINE HYDROCHLORIDE 10 ML: 10 INJECTION, SOLUTION INFILTRATION; PERINEURAL at 17:17

## 2024-03-21 RX ADMIN — HYDROCODONE BITARTRATE AND ACETAMINOPHEN 1 TABLET: 7.5; 325 TABLET ORAL at 17:33

## 2024-03-21 RX ADMIN — HYDROCODONE BITARTRATE AND ACETAMINOPHEN 1 TABLET: 7.5; 325 TABLET ORAL at 21:57

## 2024-03-21 RX ADMIN — SACUBITRIL AND VALSARTAN 1 TABLET: 24; 26 TABLET, FILM COATED ORAL at 21:57

## 2024-03-21 RX ADMIN — TETANUS TOXOID, REDUCED DIPHTHERIA TOXOID AND ACELLULAR PERTUSSIS VACCINE, ADSORBED 0.5 ML: 5; 2.5; 8; 8; 2.5 SUSPENSION INTRAMUSCULAR at 18:42

## 2024-03-21 NOTE — H&P
[facility] Medicine Services  History & Physical    Patient Name: Catalina Moreno  : 1940  MRN: [id number]  Primary Care Physician:  Mark, No Known  Date of admission: 3/21/2024  Date and Time of Service: 3/21/2024 at 1927    Subjective      Chief Complaint: Fall    History of Present Illness: Catalina Moreno is a  very pleasant 83 y.o. female with a CMH of paroxysmal atrial fibrillation on Eliquis, chronic kidney disease, anxiety depression, chronic heart failure last echo dated 2023 ejection fraction 51 to 55%, degenerative disc disease, GERD, hypertension, hyperlipidemia, who presented to [facility] on 3/21/2024 with complaints of left ankle pain and headache after a fall walking on concrete .  She is awake and alert very good historian.  She denies any precipitating factors to the fall she reports she just tripped.  She denies any chest pain, shortness of air dizziness syncope or palpitations either before or after the fall.  She is breathing comfortably on room air.  She denies any current headache.  She reports she is usually quite independent and is very careful walking.    EKG showed atrial fibrillation with RVR, sensitivity troponin 12 chest x-ray showed no acute process per radiology, x-ray of pelvis per radiology negative for acute osseous abnormality, x-ray lumbar spine negative for acute osseous abnormality CT cervical spine per radiology no acute fracture moderate cervical spondylosis C3-C4 C7-T1, CT head per radiology no acute intracranial abnormality chronic microvascular ischemic changes and diffuse cortical atrophy, CT facial bones per radiology did show nondisplaced fractures of the bilateral nasal bones as well as the anterior bony nasal septum.    She was given a tetanus shot in the emergency department and started on IV Cardizem drip with improvement in rate.  Admitted for further evaluation and treatment cardiology has been consulted.  Nasal bridge  laceration was sutured in ED and she was given a one-time dose of IV cefazolin.    Review of Systems   Constitutional:  Positive for fatigue.   HENT: Negative.     Eyes: Negative.    Respiratory: Negative.     Cardiovascular: Negative.    Gastrointestinal: Negative.    Endocrine: Negative.    Genitourinary: Negative.    Musculoskeletal: Negative.    Skin: Negative.    Allergic/Immunologic: Negative.    Neurological: Negative.    Hematological: Negative.    Psychiatric/Behavioral: Negative.     All other systems reviewed and are negative.      Personal History     Past Medical History:   Diagnosis Date    Acquired spondylolisthesis of lumbosacral region     Acute kidney injury 07/22/2022    Anxiety     Arthritis     Atrial fibrillation     paroxysmal    Broken shoulder     left-- due to fall 11-7-19 was at [facility]    Chronic pain disorder     Chronic systolic CHF (congestive heart failure) 01/05/2023    EF 36-40%    Coronary artery disease involving native coronary artery of native heart without angina pectoris 04/02/2021    nonobstructive    DDD (degenerative disc disease), cervical     DDD (degenerative disc disease), lumbar     Depression     Edema     9/2020 foot    GERD (gastroesophageal reflux disease)     H/O fall     Heart failure with mid-range ejection fraction 03/05/2022    EF 45% per 2D echo    Hypertension     Hypothyroidism     Insomnia     Low back pain     Moderate mitral regurgitation 01/05/2023    Neuropathy     Nonrheumatic tricuspid valve regurgitation     Pain in both feet     Polypharmacy     PONV (postoperative nausea and vomiting)     Pulmonary hypertension     Radiculopathy     Restless legs     Sacroiliitis     Sick sinus syndrome     Added automatically from request for surgery [id number]    Spondylolisthesis     Stage 3a chronic kidney disease     Urinary incontinence     Vitamin D deficiency        Past Surgical History:   Procedure Laterality Date    BACK SURGERY  07/19/2018    PDC &  PSF  L3-L5 insitu    CARDIAC CATHETERIZATION N/A 4/2/2021    Procedure: Cardiac Catheterization/Vascular Study;  Surgeon: Barrett Evans MD;  Location: [facility] CATH INVASIVE LOCATION;  Service: Cardiovascular;  Laterality: N/A;    CARDIAC ELECTROPHYSIOLOGY PROCEDURE N/A 1/17/2023    Procedure: Pacemaker DC new;  Surgeon: Baljeet Valero MD;  Location: [facility] CATH INVASIVE LOCATION;  Service: Cardiovascular;  Laterality: N/A;    CHOLECYSTECTOMY      COLONOSCOPY      ENDOSCOPY N/A 9/14/2023    Procedure: ESOPHAGOGASTRODUODENOSCOPY;  Surgeon: Ulysses Lamas MD;  Location: [facility] ENDOSCOPY;  Service: Gastroenterology;  Laterality: N/A;  gastritis, inflammatory gastric polyp    HYSTERECTOMY      ROTATOR CUFF REPAIR Left        Family History: family history includes Cancer in her father, paternal aunt, and paternal uncle; Diabetes in her son; Heart disease in her paternal aunt. Otherwise pertinent FHx was reviewed and not pertinent to current issue.    Social History:  reports that she has never smoked. She has never used smokeless tobacco. She reports that she does not drink alcohol and does not use drugs.    Home Medications:  Prior to Admission Medications       Prescriptions Last Dose Informant Patient Reported? Taking?    apixaban (ELIQUIS) 5 MG tablet tablet   No No    Take 1 tablet by mouth Every 12 (Twelve) Hours. Indications: Atrial Fibrillation    atorvastatin (LIPITOR) 10 MG tablet   Yes No    Take 1 tablet by mouth Daily.    dilTIAZem CD (CARDIZEM CD) 240 MG 24 hr capsule   No No    Take 1 capsule by mouth Daily.    lidocaine (LIDODERM) 5 %   No No    Place 2 patches on the skin as directed by provider Daily. Remove & Discard patch within 12 hours or as directed by MD    metoprolol succinate XL (TOPROL-XL) 25 MG 24 hr tablet   Yes No    Take 1 tablet by mouth Daily.    rOPINIRole (REQUIP) 0.25 MG tablet   No No    Take 1 tablet by mouth Every Night. Take 1 hour before bedtime.   Indications: Restless Leg Syndrome    sacubitril-valsartan (Entresto) 24-26 MG tablet   Yes No    Take 1 tablet by mouth 2 (Two) Times a Day.    sennosides-docusate (PERICOLACE) 8.6-50 MG per tablet   No No    Take 1 tablet by mouth Daily.              Allergies:  Allergies   Allergen Reactions    Baclofen Rash    Codeine Nausea Only    Ibuprofen Unknown (See Comments)     Patient doesn't know----Motin    Methocarbamol Unknown (See Comments)     Patient doesn't know    Naproxen Unknown (See Comments)     Pt. Doesn't know     Tizanidine Hcl Other (See Comments)     Syncope     Tolmetin Dizziness     Pt. Doesn't know-- same as Tolectin       Objective      Vitals:   Temp:  [98 °F (36.7 °C)] 98 °F (36.7 °C)  Heart Rate:  [] 99  Resp:  [16-19] 17  BP: (115-137)/() 115/59  Body mass index is 27.1 kg/m².  Physical Exam  Vitals reviewed.   Constitutional:       Appearance: Normal appearance. She is obese.   HENT:      Head: Normocephalic and atraumatic.        Comments: Laceration and edema bridge of nose       Right Ear: External ear normal.      Left Ear: External ear normal.      Nose:      Comments: Laceration bridge of nose , edema     Mouth/Throat:      Mouth: Mucous membranes are moist.   Eyes:      Extraocular Movements: Extraocular movements intact.   Cardiovascular:      Rate and Rhythm: Tachycardia present. Rhythm irregular.      Pulses: Normal pulses.      Heart sounds: Normal heart sounds.   Pulmonary:      Effort: Pulmonary effort is normal.      Breath sounds: Normal breath sounds.   Abdominal:      Palpations: Abdomen is soft.   Genitourinary:     Comments: deferred  Musculoskeletal:         General: Normal range of motion.      Cervical back: Normal range of motion and neck supple.      Comments: Left ankle edema   Skin:     General: Skin is warm and dry.   Neurological:      General: No focal deficit present.      Mental Status: She is alert and oriented to person, place, and time.    Psychiatric:         Mood and Affect: Mood normal.         Behavior: Behavior normal.         Thought Content: Thought content normal.         Judgment: Judgment normal.         Diagnostic Data:  Lab Results (last 24 hours)       Procedure Component Value Units Date/Time    High Sensitivity Troponin T 2Hr [[id number]]  (Normal) Collected: 03/21/24 2000    Specimen: Blood Updated: 03/21/24 2034     HS Troponin T 11 ng/L      Troponin T Delta -1 ng/L     Narrative:      High Sensitive Troponin T Reference Range:  <14.0 ng/L- Negative Female for AMI  <22.0 ng/L- Negative Male for AMI  >=14 - Abnormal Female indicating possible myocardial injury.  >=22 - Abnormal Male indicating possible myocardial injury.   Clinicians would have to utilize clinical acumen, EKG, Troponin, and serial changes to determine if it is an Acute Myocardial Infarction or myocardial injury due to an underlying chronic condition.         Magnesium [[id number]]  (Normal) Collected: 03/21/24 1732    Specimen: Blood Updated: 03/21/24 1914     Magnesium 2.0 mg/dL     Comprehensive Metabolic Panel [[id number]]  (Abnormal) Collected: 03/21/24 1732    Specimen: Blood Updated: 03/21/24 1809     Glucose 109 mg/dL      BUN 18 mg/dL      Creatinine 0.88 mg/dL      Sodium 141 mmol/L      Potassium 4.3 mmol/L      Chloride 102 mmol/L      CO2 24.0 mmol/L      Calcium 9.6 mg/dL      Total Protein 7.6 g/dL      Albumin 4.5 g/dL      ALT (SGPT) 7 U/L      AST (SGOT) 16 U/L      Alkaline Phosphatase 90 U/L      Total Bilirubin 1.2 mg/dL      Globulin 3.1 gm/dL      A/G Ratio 1.5 g/dL      BUN/Creatinine Ratio 20.5     Anion Gap 15.0 mmol/L      eGFR 65.7 mL/min/1.73     Narrative:      GFR Normal >60  Chronic Kidney Disease <60  Kidney Failure <15    The GFR formula is only valid for adults with stable renal function between ages 18 and 70.    High Sensitivity Troponin T [[id number]]  (Normal) Collected: 03/21/24 1732    Specimen: Blood Updated: 03/21/24 1809     HS  Troponin T 12 ng/L     Narrative:      High Sensitive Troponin T Reference Range:  <14.0 ng/L- Negative Female for AMI  <22.0 ng/L- Negative Male for AMI  >=14 - Abnormal Female indicating possible myocardial injury.  >=22 - Abnormal Male indicating possible myocardial injury.   Clinicians would have to utilize clinical acumen, EKG, Troponin, and serial changes to determine if it is an Acute Myocardial Infarction or myocardial injury due to an underlying chronic condition.         aPTT [[id number]]  (Abnormal) Collected: 03/21/24 1732    Specimen: Blood Updated: 03/21/24 1752     PTT 26.8 seconds     Protime-INR [[id number]]  (Normal) Collected: 03/21/24 1732    Specimen: Blood Updated: 03/21/24 1752     Protime 11.3 Seconds      INR 1.04    CBC & Differential [[id number]]  (Abnormal) Collected: 03/21/24 1732    Specimen: Blood Updated: 03/21/24 1738    Narrative:      The following orders were created for panel order CBC & Differential.  Procedure                               Abnormality         Status                     ---------                               -----------         ------                     CBC Auto Differential[[id number]]        Abnormal            Final result                 Please view results for these tests on the individual orders.    CBC Auto Differential [[id number]]  (Abnormal) Collected: 03/21/24 1732    Specimen: Blood Updated: 03/21/24 1738     WBC 10.43 10*3/mm3      RBC 4.36 10*6/mm3      Hemoglobin 11.8 g/dL      Hematocrit 38.2 %      MCV 87.6 fL      MCH 27.1 pg      MCHC 30.9 g/dL      RDW 15.9 %      RDW-SD 51.1 fl      MPV 10.4 fL      Platelets 276 10*3/mm3      Neutrophil % 75.3 %      Lymphocyte % 16.5 %      Monocyte % 6.1 %      Eosinophil % 0.8 %      Basophil % 0.9 %      Immature Grans % 0.4 %      Neutrophils, Absolute 7.86 10*3/mm3      Lymphocytes, Absolute 1.72 10*3/mm3      Monocytes, Absolute 0.64 10*3/mm3      Eosinophils, Absolute 0.08 10*3/mm3      Basophils,  Absolute 0.09 10*3/mm3      Immature Grans, Absolute 0.04 10*3/mm3      nRBC 0.0 /100 WBC              Imaging Results (Last 24 Hours)       Procedure Component Value Units Date/Time    XR Spine Lumbar Complete 4+VW [[id number]] Collected: 03/21/24 1921     Updated: 03/21/24 1929    Narrative:      XR PELVIS 1 OR 2 VW, XR SPINE LUMBAR COMPLETE 4+VW    Date of Exam: 3/21/2024 7:16 PM EDT    Indication: trauma    Comparison: None available.    Findings:  Pelvis:  The hips are maintained in alignment. Iliopectineal and ilioischial lines are intact. No fracture. No dislocation.    Lumbar spine:  There are 5 nonrib-bearing lumbar-type vertebral bodies. Vertebral bodies are maintained in height. Negative for acute fracture. Mild convex right dextrocurvature of the lumbar spine centered at L2. Multilevel disc narrowing in the lumbar spine. Mild   anterolisthesis of L3 on L4 and L4 on L5 similar to prior CT. Multilevel disc narrowing throughout the lumbar spine. Facet arthritis in the lower lumbar spine most pronounced at L4-5 and L5-S1.      Impression:      Impression:  Pelvis:  No acute osseous abnormality.    Lumbar spine:  1. Negative for acute osseous abnormality.  2. Lumbar degenerative findings above.      Electronically Signed: Edil Joshua MD    3/21/2024 7:27 PM EDT    Workstation ID: [id number]    XR Pelvis 1 or 2 View [[id number]] Collected: 03/21/24 1921     Updated: 03/21/24 1929    Narrative:      XR PELVIS 1 OR 2 VW, XR SPINE LUMBAR COMPLETE 4+VW    Date of Exam: 3/21/2024 7:16 PM EDT    Indication: trauma    Comparison: None available.    Findings:  Pelvis:  The hips are maintained in alignment. Iliopectineal and ilioischial lines are intact. No fracture. No dislocation.    Lumbar spine:  There are 5 nonrib-bearing lumbar-type vertebral bodies. Vertebral bodies are maintained in height. Negative for acute fracture. Mild convex right dextrocurvature of the lumbar spine centered at L2. Multilevel disc narrowing  in the lumbar spine. Mild   anterolisthesis of L3 on L4 and L4 on L5 similar to prior CT. Multilevel disc narrowing throughout the lumbar spine. Facet arthritis in the lower lumbar spine most pronounced at L4-5 and L5-S1.      Impression:      Impression:  Pelvis:  No acute osseous abnormality.    Lumbar spine:  1. Negative for acute osseous abnormality.  2. Lumbar degenerative findings above.      Electronically Signed: Edil Joshua MD    3/21/2024 7:27 PM EDT    Workstation ID: [id number]    XR Chest 1 View [[id number]] Collected: 03/21/24 1919     Updated: 03/21/24 1922    Narrative:      XR CHEST 1 VW    Date of Exam: 3/21/2024 7:15 PM EDT    Indication: tachcardia    Comparison: 9/18/2023    Findings:  Left-sided AICD noted. Stable mild cardiomegaly. Lungs are without consolidation. Patient rotated to the right. Negative for pneumothorax. No pleural effusion. Osseous structures appear grossly intact.      Impression:      Impression:  No acute process.      Electronically Signed: Edil Joshua MD    3/21/2024 7:20 PM EDT    Workstation ID: [id number]    XR Ankle 3+ View Left [[id number]] Collected: 03/21/24 1733     Updated: 03/21/24 1736    Narrative:      XR ANKLE 3+ VW LEFT, XR FOOT 3+ VW LEFT    Date of Exam: 3/21/2024 5:09 PM EDT    Indication: trauma    Comparison: None available.    Findings:  No evidence of acute fracture nor dislocation. Alignment is normal. No degenerative arthrosis throughout. Soft tissues are unremarkable.      Impression:      Impression:  No acute osseous injury identified.      Electronically Signed: Kirill Catalan MD    3/21/2024 5:34 PM EDT    Workstation ID: [id number]    XR Foot 3+ View Left [[id number]] Collected: 03/21/24 1733     Updated: 03/21/24 1736    Narrative:      XR ANKLE 3+ VW LEFT, XR FOOT 3+ VW LEFT    Date of Exam: 3/21/2024 5:09 PM EDT    Indication: trauma    Comparison: None available.    Findings:  No evidence of acute fracture nor dislocation. Alignment is normal. No  degenerative arthrosis throughout. Soft tissues are unremarkable.      Impression:      Impression:  No acute osseous injury identified.      Electronically Signed: Kirill Cataaln MD    3/21/2024 5:34 PM EDT    Workstation ID: [id number]    CT Head Without Contrast [[id number]] Collected: 03/21/24 1653     Updated: 03/21/24 1704    Narrative:      CT HEAD WO CONTRAST, CT CERVICAL SPINE WO CONTRAST, CT FACIAL BONES WO CONTRAST    Date of Exam: 3/21/2024 4:46 PM EDT    Indication: trauma.    Comparison: Noncontrast head CT dated 7/21/2022    Technique: Axial CT images were obtained of the head without contrast administration.  Coronal reconstructions were performed.  Automated exposure control and iterative reconstruction methods were used.        CT HEAD WITHOUT IV CONTRAST    FINDINGS:    Foci of periventricular and subcortical white matter hypoattenuation are consistent with chronic, microvascular ischemic change. There is age-related loss of brain parenchymal volume with prominence of sulcation and ventriculomegaly. No significant mass   effect, midline shift, intracranial hemorrhage, or hydrocephalus is identified. No extra-axial fluid collection is identified.         Impression:      1.No acute intracranial abnormality is identified.  2.Findings compatible with chronic microvascular ischemic change and diffuse cortical atrophy.    -------    CT FACE WITHOUT IV CONTRAST    FINDINGS:  There are nondisplaced fractures of the bilateral nasal bones as well as the anterior bony nasal septum. There is soft tissue swelling across the nasal bridge. Bony orbits, globes, retrobulbar soft tissues, and optic nerves appear unremarkable   bilaterally. Paranasal sinuses and mastoid air cells appear well aerated without air-fluid levels identified. The pterygoid plates and zygomatic arches are intact. Superficial soft tissues are without significant abnormality. Limited visualization of the   intracranial contents is  unremarkable.    IMPRESSION:  Nondisplaced fractures of the bilateral nasal bones as well as the anterior bony nasal septum.    -------    CT CERVICAL SPINE WITHOUT IV CONTRAST    FINDINGS:  No acute fracture is identified. There is moderate cervical spondylosis, and there is grade 1 anterolisthesis at C3-C4 and C7-T1. Vertebral body heights are maintained. The craniocervical and atlantoaxial junctions appear within normal limits. The   prevertebral and paraspinal soft tissues are without focal abnormality. Left chest wall pacemaker is partially imaged.    The visualized lung apices are clear.    IMPRESSION:  1.No acute fracture is identified within the cervical spine.  2.Moderate cervical spondylosis with grade 1 anterolisthesis at C3-C4 and C7-T1.          Electronically Signed: Jimmy Collins MD    3/21/2024 5:02 PM EDT    Workstation ID: [id number]    CT Cervical Spine Without Contrast [[id number]] Collected: 03/21/24 1653     Updated: 03/21/24 1704    Narrative:      CT HEAD WO CONTRAST, CT CERVICAL SPINE WO CONTRAST, CT FACIAL BONES WO CONTRAST    Date of Exam: 3/21/2024 4:46 PM EDT    Indication: trauma.    Comparison: Noncontrast head CT dated 7/21/2022    Technique: Axial CT images were obtained of the head without contrast administration.  Coronal reconstructions were performed.  Automated exposure control and iterative reconstruction methods were used.        CT HEAD WITHOUT IV CONTRAST    FINDINGS:    Foci of periventricular and subcortical white matter hypoattenuation are consistent with chronic, microvascular ischemic change. There is age-related loss of brain parenchymal volume with prominence of sulcation and ventriculomegaly. No significant mass   effect, midline shift, intracranial hemorrhage, or hydrocephalus is identified. No extra-axial fluid collection is identified.         Impression:      1.No acute intracranial abnormality is identified.  2.Findings compatible with chronic microvascular  ischemic change and diffuse cortical atrophy.    -------    CT FACE WITHOUT IV CONTRAST    FINDINGS:  There are nondisplaced fractures of the bilateral nasal bones as well as the anterior bony nasal septum. There is soft tissue swelling across the nasal bridge. Bony orbits, globes, retrobulbar soft tissues, and optic nerves appear unremarkable   bilaterally. Paranasal sinuses and mastoid air cells appear well aerated without air-fluid levels identified. The pterygoid plates and zygomatic arches are intact. Superficial soft tissues are without significant abnormality. Limited visualization of the   intracranial contents is unremarkable.    IMPRESSION:  Nondisplaced fractures of the bilateral nasal bones as well as the anterior bony nasal septum.    -------    CT CERVICAL SPINE WITHOUT IV CONTRAST    FINDINGS:  No acute fracture is identified. There is moderate cervical spondylosis, and there is grade 1 anterolisthesis at C3-C4 and C7-T1. Vertebral body heights are maintained. The craniocervical and atlantoaxial junctions appear within normal limits. The   prevertebral and paraspinal soft tissues are without focal abnormality. Left chest wall pacemaker is partially imaged.    The visualized lung apices are clear.    IMPRESSION:  1.No acute fracture is identified within the cervical spine.  2.Moderate cervical spondylosis with grade 1 anterolisthesis at C3-C4 and C7-T1.          Electronically Signed: Jimmy Collins MD    3/21/2024 5:02 PM EDT    Workstation ID: [id number]    CT Facial Bones Without Contrast [[id number]] Collected: 03/21/24 1653     Updated: 03/21/24 1704    Narrative:      CT HEAD WO CONTRAST, CT CERVICAL SPINE WO CONTRAST, CT FACIAL BONES WO CONTRAST    Date of Exam: 3/21/2024 4:46 PM EDT    Indication: trauma.    Comparison: Noncontrast head CT dated 7/21/2022    Technique: Axial CT images were obtained of the head without contrast administration.  Coronal reconstructions were performed.  Automated  exposure control and iterative reconstruction methods were used.        CT HEAD WITHOUT IV CONTRAST    FINDINGS:    Foci of periventricular and subcortical white matter hypoattenuation are consistent with chronic, microvascular ischemic change. There is age-related loss of brain parenchymal volume with prominence of sulcation and ventriculomegaly. No significant mass   effect, midline shift, intracranial hemorrhage, or hydrocephalus is identified. No extra-axial fluid collection is identified.         Impression:      1.No acute intracranial abnormality is identified.  2.Findings compatible with chronic microvascular ischemic change and diffuse cortical atrophy.    -------    CT FACE WITHOUT IV CONTRAST    FINDINGS:  There are nondisplaced fractures of the bilateral nasal bones as well as the anterior bony nasal septum. There is soft tissue swelling across the nasal bridge. Bony orbits, globes, retrobulbar soft tissues, and optic nerves appear unremarkable   bilaterally. Paranasal sinuses and mastoid air cells appear well aerated without air-fluid levels identified. The pterygoid plates and zygomatic arches are intact. Superficial soft tissues are without significant abnormality. Limited visualization of the   intracranial contents is unremarkable.    IMPRESSION:  Nondisplaced fractures of the bilateral nasal bones as well as the anterior bony nasal septum.    -------    CT CERVICAL SPINE WITHOUT IV CONTRAST    FINDINGS:  No acute fracture is identified. There is moderate cervical spondylosis, and there is grade 1 anterolisthesis at C3-C4 and C7-T1. Vertebral body heights are maintained. The craniocervical and atlantoaxial junctions appear within normal limits. The   prevertebral and paraspinal soft tissues are without focal abnormality. Left chest wall pacemaker is partially imaged.    The visualized lung apices are clear.    IMPRESSION:  1.No acute fracture is identified within the cervical spine.  2.Moderate  cervical spondylosis with grade 1 anterolisthesis at C3-C4 and C7-T1.          Electronically Signed: Jimmy Collins MD    3/21/2024 5:02 PM EDT    Workstation ID: [id number]              Assessment & Plan        This is a 83 y.o. female with:    Active and Resolved Problems  Active Hospital Problems    Diagnosis  POA    **Atrial fibrillation with RVR [I48.91]  Yes     Priority: High    Fall [W19.XXXA]  Yes     Priority: High    Nasal bones, closed fracture [S02.2XXA]  Yes     Priority: Medium    Presence of cardiac pacemaker [Z95.0]  Yes    Restless legs syndrome [G25.81]  Yes    Generalized anxiety disorder [F41.1]  Yes    Essential (primary) hypertension [I10]  Yes    Coronary artery disease involving native coronary artery of native heart without angina pectoris [I25.10]  Yes    Stage 3a chronic kidney disease [N18.31]  Yes      Resolved Hospital Problems   No resolved problems to display.     Chronic atrial fibrillation now with RVR, improving on Cardizem drip, on home apixaban, and metoprolol hold home p.o. diltiazem for now, continuous cardiac monitoring cardiology consulted, TSH in a.m.    Fall, mechanical accidental, fall precautions PT eval and treat    Nasal bones and septum fracture, breathing normally on room air, nasal bridge laceration sutured in ED, one-time dose IV cefazolin in ED, not continued, consider ordering p.o. Keflex upon discharge if indicated, [place] 7.5 325 every 4 hours as needed p.o.    Presence of a cardiac pacemaker, on home Entresto    Restless leg syndrome, on home Inderal    Essential primary hypertension, continue home metoprolol hold diltiazem while on diltiazem drip, on Entresto monitor BP    Coronary artery disease, denies chest pain, continuous cardiac monitoring on statin, troponin not elevated     History of chronic kidney disease stable avoid nephrotoxic meds trend renal function      DVT prophylaxis:  Medical DVT prophylaxis orders are present.        The patient desires to  be as follows:    CODE STATUS:    Code Status (Patient has no pulse and is not breathing): CPR (Attempt to Resuscitate)  Medical Interventions (Patient has pulse or is breathing): Full Support        Admission Status:  I believe this patient meets observation status.    Expected Length of Stay: pending cardiac evaluation    PDMP and Medication Dispenses via Sidebar reviewed and consistent with patient reported medications.    I discussed the patient's findings and my recommendations with patient.      Signature:     This document has been electronically signed by KALYANI Guerrero on March 21, 2024 21:26 EDT   [facility] Hospitalist Team

## 2024-03-21 NOTE — Clinical Note
Level of Care: Med/Surg [1]   Admitting Physician: MANISH MEDRANO [[id number]]   Attending Physician: MANISH MEDRANO [[id number]]   Bed Request Comments: u

## 2024-03-21 NOTE — LETTER
EMS Transport Request  For use at [facility], [place], [place], [place], and [place] only   Patient Name: Catalina Moreno : 1940   Weight:76.5 kg (168 lb 10.4 oz) Pick-up Location: 2202 BLS/ALS: BLS/ALS: BLS   Insurance: MEDICARE Auth End Date: 3/26/2024   Pre-Cert #: D/C Summary complete:    Destination: Other [facility]   Contact Precautions: None   Equipment (O2, Fluids, etc.): None   Arrive By Date/Time:  asap Stretcher/WC: Wheelchair   CM Requesting: Aidee Pierre RN Ext: [phone]   Notes/Medical Necessity: Ambulatory with assist x1. Recent fall     ______________________________________________________________________    *Only 2 patient bags OR 1 carry-on size bag are permitted.  Wheelchairs and walkers CANNOT transported with the patient. Acknowledge: Yes

## 2024-03-21 NOTE — ED NOTES
EMS reports pt tripped on some twigs and fell down. Unwitnessed fall. EMS reports pt did not lose consciousness. Pt reports she hit her face. Pt reports pain to her face and her left foot and ankle. Noted abrasion on bridge of nose- currently covered with gauze. Noted welling to lower face. Pt reports she is currently Eliquis for hx of AFIB. Pacemaker to L chest. Pt currently A&Ox4.

## 2024-03-21 NOTE — ED PROVIDER NOTES
Subjective   History of Present Illness  Chief complaint: Patient is an 83-year-old.  She is tripped while she was walking on concrete and landed on the ground.  She hit her face.  She did not lose consciousness.  She is on Eliquis.  She also has a headache.  She has left ankle pain.  No other injury.    Context:    Duration:    Timing:    Severity:    Associated Symptoms:        PCP:  LMP:      Review of Systems    Past Medical History:   Diagnosis Date    Acquired spondylolisthesis of lumbosacral region     Acute kidney injury 07/22/2022    Anxiety     Arthritis     Atrial fibrillation     paroxysmal    Broken shoulder     left-- due to fall 11-7-19 was at [facility]    Chronic pain disorder     Chronic systolic CHF (congestive heart failure) 01/05/2023    EF 36-40%    Coronary artery disease involving native coronary artery of native heart without angina pectoris 04/02/2021    nonobstructive    DDD (degenerative disc disease), cervical     DDD (degenerative disc disease), lumbar     Depression     Edema     9/2020 foot    GERD (gastroesophageal reflux disease)     H/O fall     Heart failure with mid-range ejection fraction 03/05/2022    EF 45% per 2D echo    Hypertension     Hypothyroidism     Insomnia     Low back pain     Moderate mitral regurgitation 01/05/2023    Neuropathy     Nonrheumatic tricuspid valve regurgitation     Pain in both feet     Polypharmacy     PONV (postoperative nausea and vomiting)     Pulmonary hypertension     Radiculopathy     Restless legs     Sacroiliitis     Sick sinus syndrome     Added automatically from request for surgery [id number]    Spondylolisthesis     Stage 3a chronic kidney disease     Urinary incontinence     Vitamin D deficiency        Allergies   Allergen Reactions    Baclofen Rash    Codeine Nausea Only    Ibuprofen Unknown (See Comments)     Patient doesn't know----Motin    Methocarbamol Unknown (See Comments)     Patient doesn't know    Naproxen Unknown (See Comments)      Pt. Doesn't know     Tizanidine Hcl Other (See Comments)     Syncope     Tolmetin Dizziness     Pt. Doesn't know-- same as Tolectin       Past Surgical History:   Procedure Laterality Date    BACK SURGERY  07/19/2018    PDC &  PSF L3-L5 insitu    CARDIAC CATHETERIZATION N/A 4/2/2021    Procedure: Cardiac Catheterization/Vascular Study;  Surgeon: Barrett Evans MD;  Location: [facility] CATH INVASIVE LOCATION;  Service: Cardiovascular;  Laterality: N/A;    CARDIAC ELECTROPHYSIOLOGY PROCEDURE N/A 1/17/2023    Procedure: Pacemaker DC new;  Surgeon: Baljeet Valero MD;  Location: [facility] CATH INVASIVE LOCATION;  Service: Cardiovascular;  Laterality: N/A;    CHOLECYSTECTOMY      COLONOSCOPY      ENDOSCOPY N/A 9/14/2023    Procedure: ESOPHAGOGASTRODUODENOSCOPY;  Surgeon: Ulysses Lamas MD;  Location: [facility] ENDOSCOPY;  Service: Gastroenterology;  Laterality: N/A;  gastritis, inflammatory gastric polyp    HYSTERECTOMY      ROTATOR CUFF REPAIR Left        Family History   Problem Relation Age of Onset    Cancer Father     Diabetes Son     Cancer Paternal Aunt     Heart disease Paternal Aunt     Cancer Paternal Uncle        Social History     Socioeconomic History    Marital status:    Tobacco Use    Smoking status: Never    Smokeless tobacco: Never   Vaping Use    Vaping status: Never Used   Substance and Sexual Activity    Alcohol use: No    Drug use: Never    Sexual activity: Defer           Objective   Physical Exam    Laceration Repair    Date/Time: 3/21/2024 6:43 PM    Performed by: Keyur Hernandez DO  Authorized by: Keyur Hernandez DO    Consent:     Consent obtained:  Verbal    Consent given by:  Patient    Risks, benefits, and alternatives were discussed: yes      Risks discussed:  Infection, need for additional repair, nerve damage, poor wound healing, poor cosmetic result, pain, retained foreign body, tendon damage and vascular damage    Alternatives discussed:  No  treatment and delayed treatment  Universal protocol:     Procedure explained and questions answered to patient or proxy's satisfaction: yes      Immediately prior to procedure, a time out was called: yes      Patient identity confirmed:  Verbally with patient  Anesthesia:     Anesthesia method:  Local infiltration    Local anesthetic:  Lidocaine 1% w/o epi  Laceration details:     Location:  Face    Face location:  Nose    Length (cm):  1.5  Pre-procedure details:     Preparation:  Patient was prepped and draped in usual sterile fashion and imaging obtained to evaluate for foreign bodies  Exploration:     Hemostasis achieved with:  Direct pressure    Imaging obtained comment:  Ct    Wound extent: areolar tissue violated      Contaminated: no    Treatment:     Area cleansed with:  Chlorhexidine    Amount of cleaning:  Extensive    Irrigation solution:  Sterile saline    Irrigation method:  Pressure wash    Debridement:  None    Undermining:  None  Skin repair:     Repair method:  Sutures    Suture size:  6-0    Suture material:  Prolene    Suture technique:  Simple interrupted    Number of sutures:  4  Approximation:     Approximation:  Close  Repair type:     Repair type:  Simple  Post-procedure details:     Dressing:  Open (no dressing)    Procedure completion:  Tolerated well, no immediate complications             ED Course                                 Results for orders placed or performed during the hospital encounter of 03/21/24   Comprehensive Metabolic Panel    Specimen: Blood   Result Value Ref Range    Glucose 109 (H) 65 - 99 mg/dL    BUN 18 8 - 23 mg/dL    Creatinine 0.88 0.57 - 1.00 mg/dL    Sodium 141 136 - 145 mmol/L    Potassium 4.3 3.5 - 5.2 mmol/L    Chloride 102 98 - 107 mmol/L    CO2 24.0 22.0 - 29.0 mmol/L    Calcium 9.6 8.6 - 10.5 mg/dL    Total Protein 7.6 6.0 - 8.5 g/dL    Albumin 4.5 3.5 - 5.2 g/dL    ALT (SGPT) 7 1 - 33 U/L    AST (SGOT) 16 1 - 32 U/L    Alkaline Phosphatase 90 39 - 117  U/L    Total Bilirubin 1.2 0.0 - 1.2 mg/dL    Globulin 3.1 gm/dL    A/G Ratio 1.5 g/dL    BUN/Creatinine Ratio 20.5 7.0 - 25.0    Anion Gap 15.0 5.0 - 15.0 mmol/L    eGFR 65.7 >60.0 mL/min/1.73   aPTT    Specimen: Blood   Result Value Ref Range    PTT 26.8 (L) 61.0 - 76.5 seconds   Protime-INR    Specimen: Blood   Result Value Ref Range    Protime 11.3 9.6 - 11.7 Seconds    INR 1.04 0.93 - 1.10   High Sensitivity Troponin T    Specimen: Blood   Result Value Ref Range    HS Troponin T 12 <14 ng/L   CBC Auto Differential    Specimen: Blood   Result Value Ref Range    WBC 10.43 3.40 - 10.80 10*3/mm3    RBC 4.36 3.77 - 5.28 10*6/mm3    Hemoglobin 11.8 (L) 12.0 - 15.9 g/dL    Hematocrit 38.2 34.0 - 46.6 %    MCV 87.6 79.0 - 97.0 fL    MCH 27.1 26.6 - 33.0 pg    MCHC 30.9 (L) 31.5 - 35.7 g/dL    RDW 15.9 (H) 12.3 - 15.4 %    RDW-SD 51.1 37.0 - 54.0 fl    MPV 10.4 6.0 - 12.0 fL    Platelets 276 140 - 450 10*3/mm3    Neutrophil % 75.3 42.7 - 76.0 %    Lymphocyte % 16.5 (L) 19.6 - 45.3 %    Monocyte % 6.1 5.0 - 12.0 %    Eosinophil % 0.8 0.3 - 6.2 %    Basophil % 0.9 0.0 - 1.5 %    Immature Grans % 0.4 0.0 - 0.5 %    Neutrophils, Absolute 7.86 (H) 1.70 - 7.00 10*3/mm3    Lymphocytes, Absolute 1.72 0.70 - 3.10 10*3/mm3    Monocytes, Absolute 0.64 0.10 - 0.90 10*3/mm3    Eosinophils, Absolute 0.08 0.00 - 0.40 10*3/mm3    Basophils, Absolute 0.09 0.00 - 0.20 10*3/mm3    Immature Grans, Absolute 0.04 0.00 - 0.05 10*3/mm3    nRBC 0.0 0.0 - 0.2 /100 WBC   ECG 12 Lead Tachycardia   Result Value Ref Range    QTC Interval  ms       XR Ankle 3+ View Left    Result Date: 3/21/2024  Impression: No acute osseous injury identified. Electronically Signed: Kirill Catalan MD  3/21/2024 5:34 PM EDT  Workstation ID: [id number]    XR Foot 3+ View Left    Result Date: 3/21/2024  Impression: No acute osseous injury identified. Electronically Signed: Kirill Catalan MD  3/21/2024 5:34 PM EDT  Workstation ID: [id number]    CT Head Without  Contrast    Result Date: 3/21/2024  1.No acute intracranial abnormality is identified. 2.Findings compatible with chronic microvascular ischemic change and diffuse cortical atrophy. ------- CT FACE WITHOUT IV CONTRAST FINDINGS: There are nondisplaced fractures of the bilateral nasal bones as well as the anterior bony nasal septum. There is soft tissue swelling across the nasal bridge. Bony orbits, globes, retrobulbar soft tissues, and optic nerves appear unremarkable bilaterally. Paranasal sinuses and mastoid air cells appear well aerated without air-fluid levels identified. The pterygoid plates and zygomatic arches are intact. Superficial soft tissues are without significant abnormality. Limited visualization of the  intracranial contents is unremarkable. IMPRESSION: Nondisplaced fractures of the bilateral nasal bones as well as the anterior bony nasal septum. ------- CT CERVICAL SPINE WITHOUT IV CONTRAST FINDINGS: No acute fracture is identified. There is moderate cervical spondylosis, and there is grade 1 anterolisthesis at C3-C4 and C7-T1. Vertebral body heights are maintained. The craniocervical and atlantoaxial junctions appear within normal limits. The prevertebral and paraspinal soft tissues are without focal abnormality. Left chest wall pacemaker is partially imaged. The visualized lung apices are clear. IMPRESSION: 1.No acute fracture is identified within the cervical spine. 2.Moderate cervical spondylosis with grade 1 anterolisthesis at C3-C4 and C7-T1. Electronically Signed: Jimmy Collins MD  3/21/2024 5:02 PM EDT  Workstation ID: [id number]    CT Cervical Spine Without Contrast    Result Date: 3/21/2024  1.No acute intracranial abnormality is identified. 2.Findings compatible with chronic microvascular ischemic change and diffuse cortical atrophy. ------- CT FACE WITHOUT IV CONTRAST FINDINGS: There are nondisplaced fractures of the bilateral nasal bones as well as the anterior bony nasal septum. There  is soft tissue swelling across the nasal bridge. Bony orbits, globes, retrobulbar soft tissues, and optic nerves appear unremarkable bilaterally. Paranasal sinuses and mastoid air cells appear well aerated without air-fluid levels identified. The pterygoid plates and zygomatic arches are intact. Superficial soft tissues are without significant abnormality. Limited visualization of the  intracranial contents is unremarkable. IMPRESSION: Nondisplaced fractures of the bilateral nasal bones as well as the anterior bony nasal septum. ------- CT CERVICAL SPINE WITHOUT IV CONTRAST FINDINGS: No acute fracture is identified. There is moderate cervical spondylosis, and there is grade 1 anterolisthesis at C3-C4 and C7-T1. Vertebral body heights are maintained. The craniocervical and atlantoaxial junctions appear within normal limits. The prevertebral and paraspinal soft tissues are without focal abnormality. Left chest wall pacemaker is partially imaged. The visualized lung apices are clear. IMPRESSION: 1.No acute fracture is identified within the cervical spine. 2.Moderate cervical spondylosis with grade 1 anterolisthesis at C3-C4 and C7-T1. Electronically Signed: Jimmy Collins MD  3/21/2024 5:02 PM EDT  Workstation ID: [id number]    CT Facial Bones Without Contrast    Result Date: 3/21/2024  1.No acute intracranial abnormality is identified. 2.Findings compatible with chronic microvascular ischemic change and diffuse cortical atrophy. ------- CT FACE WITHOUT IV CONTRAST FINDINGS: There are nondisplaced fractures of the bilateral nasal bones as well as the anterior bony nasal septum. There is soft tissue swelling across the nasal bridge. Bony orbits, globes, retrobulbar soft tissues, and optic nerves appear unremarkable bilaterally. Paranasal sinuses and mastoid air cells appear well aerated without air-fluid levels identified. The pterygoid plates and zygomatic arches are intact. Superficial soft tissues are without  significant abnormality. Limited visualization of the  intracranial contents is unremarkable. IMPRESSION: Nondisplaced fractures of the bilateral nasal bones as well as the anterior bony nasal septum. ------- CT CERVICAL SPINE WITHOUT IV CONTRAST FINDINGS: No acute fracture is identified. There is moderate cervical spondylosis, and there is grade 1 anterolisthesis at C3-C4 and C7-T1. Vertebral body heights are maintained. The craniocervical and atlantoaxial junctions appear within normal limits. The prevertebral and paraspinal soft tissues are without focal abnormality. Left chest wall pacemaker is partially imaged. The visualized lung apices are clear. IMPRESSION: 1.No acute fracture is identified within the cervical spine. 2.Moderate cervical spondylosis with grade 1 anterolisthesis at C3-C4 and C7-T1. Electronically Signed: Jimmy Collins MD  3/21/2024 5:02 PM EDT  Workstation ID: [id number]             Medical Decision Making  Patient was seen and evaluated for the above problem    Differential diagnose includes was not limited to skull fracture, intracerebral hemorrhage, facial fracture, A-fib with RVR, syncope, mechanical fall      Patient describes a mechanical fall, however her initial EKG shows A-fib with rapid ventricular response rate of 138 with nonspecific T wave changes in the anterior lateral leads.  Atrial fibrillation could have played a part in the fall that she had.  She has a laceration to the nose which was repaired by myself.  It does not appear to go down to the bone but it is over the fractures.  She was provided Ancef.  Tetanus was also updated.  She is on a Cardizem drip.  Her rate is down into the 1 teens currently.  Blood pressure is stable.  I discussed admission to hospital patient verbalized understanding.  Discussed admission with  who agrees with admission.    Amount and/or Complexity of Data Reviewed  Labs: ordered. Decision-making details documented in ED Course.      Details: Labs reviewed by myself  Radiology: ordered and independent interpretation performed. Decision-making details documented in ED Course.     Details: X-rays ankle and foot reviewed by myself no acute process.  CT scan of the face shows nasal bone and nasal septal fractures.  CT head shows no intracerebral hemorrhage.  CT C-spine shows no fractures.  ECG/medicine tests: ordered and independent interpretation performed.     Details: EKG interpreted by myself shows A-fib rate of 138    Risk  Prescription drug management.  Drug therapy requiring intensive monitoring for toxicity.  Decision regarding hospitalization.        Final diagnoses:   None   Nasal laceration  Nasal fractures  Nasal septal fracture  Head injury  Ankle sprain  A-fib with RVR    ED Disposition  ED Disposition       None            No follow-up provider specified.       Medication List      No changes were made to your prescriptions during this visit.            Keyur Hernandez DO  03/21/24 3602

## 2024-03-22 LAB
QTC INTERVAL: NORMAL MS
TSH SERPL DL<=0.05 MIU/L-ACNC: 8.9 UIU/ML (ref 0.27–4.2)

## 2024-03-22 PROCEDURE — 25010000002 DIGOXIN PER 500 MCG: Performed by: INTERNAL MEDICINE

## 2024-03-22 PROCEDURE — 36415 COLL VENOUS BLD VENIPUNCTURE: CPT | Performed by: NURSE PRACTITIONER

## 2024-03-22 PROCEDURE — 97162 PT EVAL MOD COMPLEX 30 MIN: CPT

## 2024-03-22 PROCEDURE — 84443 ASSAY THYROID STIM HORMONE: CPT | Performed by: NURSE PRACTITIONER

## 2024-03-22 PROCEDURE — 97166 OT EVAL MOD COMPLEX 45 MIN: CPT

## 2024-03-22 PROCEDURE — 99222 1ST HOSP IP/OBS MODERATE 55: CPT | Performed by: INTERNAL MEDICINE

## 2024-03-22 RX ORDER — DIGOXIN 125 MCG
125 TABLET ORAL
Status: DISCONTINUED | OUTPATIENT
Start: 2024-03-23 | End: 2024-03-23

## 2024-03-22 RX ORDER — DIGOXIN 0.25 MG/ML
250 INJECTION INTRAMUSCULAR; INTRAVENOUS ONCE
Status: COMPLETED | OUTPATIENT
Start: 2024-03-22 | End: 2024-03-22

## 2024-03-22 RX ADMIN — DIGOXIN 250 MCG: 250 INJECTION, SOLUTION INTRAMUSCULAR; INTRAVENOUS; PARENTERAL at 16:16

## 2024-03-22 RX ADMIN — Medication 5 MG: at 21:38

## 2024-03-22 RX ADMIN — HYDROCODONE BITARTRATE AND ACETAMINOPHEN 1 TABLET: 7.5; 325 TABLET ORAL at 21:38

## 2024-03-22 RX ADMIN — ROPINIROLE HYDROCHLORIDE 0.25 MG: 0.25 TABLET, FILM COATED ORAL at 21:38

## 2024-03-22 RX ADMIN — Medication 10 MG/HR: at 05:06

## 2024-03-22 RX ADMIN — APIXABAN 5 MG: 5 TABLET, FILM COATED ORAL at 08:27

## 2024-03-22 RX ADMIN — HYDROCODONE BITARTRATE AND ACETAMINOPHEN 1 TABLET: 7.5; 325 TABLET ORAL at 08:33

## 2024-03-22 RX ADMIN — ATORVASTATIN CALCIUM 10 MG: 10 TABLET, FILM COATED ORAL at 08:27

## 2024-03-22 RX ADMIN — DOCUSATE SODIUM AND SENNOSIDES 1 TABLET: 8.6; 5 TABLET, FILM COATED ORAL at 08:27

## 2024-03-22 RX ADMIN — LIDOCAINE 2 PATCH: 4 PATCH TOPICAL at 08:28

## 2024-03-22 RX ADMIN — Medication 5 MG: at 00:19

## 2024-03-22 RX ADMIN — SACUBITRIL AND VALSARTAN 1 TABLET: 24; 26 TABLET, FILM COATED ORAL at 21:38

## 2024-03-22 RX ADMIN — APIXABAN 5 MG: 5 TABLET, FILM COATED ORAL at 21:39

## 2024-03-22 RX ADMIN — HYDROCODONE BITARTRATE AND ACETAMINOPHEN 1 TABLET: 7.5; 325 TABLET ORAL at 16:16

## 2024-03-22 NOTE — PLAN OF CARE
Goal Outcome Evaluation:  Plan of Care Reviewed With: patient        Progress: no change  Outcome Evaluation: Pt is an 82 y/o F admitted to [facility] 3/21/24 s/p fall. Hospital dx A.fib with RVR. Pt found to have nasal bones and septum fx, nasal bridge laceration which was sutured in ED. CT head (-) acute. CT face (+) nondisplaced fx of jonah nasal bones, anterior bony nasal septum. CT cervical spine (-) acute. XR L foot/ankle (-) acute. CXR (-) acute. XR pelvis (-) acute. XR lumbar spine (-) acute. PMHx significant for CKDIII, HTN, pacemaker in place, depressive disorder, fibromyalgia, CHF, hx falls, DDD, and polyneuropathy. At baseline pt resides alone in [facility] with 0 VLADIMIR. Pt typically (I) with ADLs and uses of quad cane for mobility. This date pt A&Ox3, disoriented to year and able to self correct when reporting place. Pt on RA with nasal canula on top of head, placing 2L back  on pt, increasing from 90% to 95%. Pt in supine and c/o 9/10 pain in head from fall. Pt requires SBA for bed mobility, CGA to come to standing and to transfer from bed to chair. Pt noted to be tachycardic with minimal activity, therefore further activity was not progressed at this time. Pt is far below baseline and is at high risk for fall. OT recommending SNF when medically appropriate for d/c, OT will follow while admitted.      Anticipated Discharge Disposition (OT): skilled nursing facility

## 2024-03-22 NOTE — THERAPY EVALUATION
Patient Name: Catalina Moreno  : 1940    MRN: [id number]                              Today's Date: 3/22/2024       Admit Date: 3/21/2024    Visit Dx:     ICD-10-CM ICD-9-CM   1. Injury of head, initial encounter  S09.90XA 959.01   2. Open fracture of nasal bone, initial encounter  S02.2XXB 802.1   3. Atrial fibrillation with RVR  I48.91 427.31   4. Sprain of left ankle, unspecified ligament, initial encounter  S93.402A 845.00     Patient Active Problem List   Diagnosis    Arthritis    Depressive disorder    Primary fibromyalgia syndrome    Hypothyroidism    Atrial fibrillation with RVR    Stage 3a chronic kidney disease    Chronic low back pain    Age-related cognitive decline    Generalized anxiety disorder    Polyneuropathy, unspecified    Restless legs syndrome    Paroxysmal atrial fibrillation    Essential (primary) hypertension    Gastro-esophageal reflux disease without esophagitis    Pulmonary hypertension, unspecified    Coronary artery disease involving native coronary artery of native heart without angina pectoris    Presence of cardiac pacemaker    Chronic HFrEF (heart failure with reduced ejection fraction)    Atrial flutter with rapid ventricular response    Weakness    AF (atrial fibrillation)    Chronic heart failure with preserved ejection fraction (HFpEF)    Fall    Nasal bones, closed fracture     Past Medical History:   Diagnosis Date    Acquired spondylolisthesis of lumbosacral region     Acute kidney injury 2022    Anxiety     Arthritis     Atrial fibrillation     paroxysmal    Broken shoulder     left-- due to fall 20 was at [facility]    Chronic pain disorder     Chronic systolic CHF (congestive heart failure) 2023    EF 36-40%    Coronary artery disease involving native coronary artery of native heart without angina pectoris 2021    nonobstructive    DDD (degenerative disc disease), cervical     DDD (degenerative disc disease), lumbar     Depression     Edema      9/2020 foot    GERD (gastroesophageal reflux disease)     H/O fall     Heart failure with mid-range ejection fraction 03/05/2022    EF 45% per 2D echo    Hypertension     Hypothyroidism     Insomnia     Low back pain     Moderate mitral regurgitation 01/05/2023    Neuropathy     Nonrheumatic tricuspid valve regurgitation     Pain in both feet     Polypharmacy     PONV (postoperative nausea and vomiting)     Pulmonary hypertension     Radiculopathy     Restless legs     Sacroiliitis     Sick sinus syndrome     Added automatically from request for surgery [id number]    Spondylolisthesis     Stage 3a chronic kidney disease     Urinary incontinence     Vitamin D deficiency      Past Surgical History:   Procedure Laterality Date    BACK SURGERY  07/19/2018    PDC &  PSF L3-L5 insitu    CARDIAC CATHETERIZATION N/A 4/2/2021    Procedure: Cardiac Catheterization/Vascular Study;  Surgeon: Barrett Evans MD;  Location: [facility] CATH INVASIVE LOCATION;  Service: Cardiovascular;  Laterality: N/A;    CARDIAC ELECTROPHYSIOLOGY PROCEDURE N/A 1/17/2023    Procedure: Pacemaker DC new;  Surgeon: Baljeet Valero MD;  Location: [facility] CATH INVASIVE LOCATION;  Service: Cardiovascular;  Laterality: N/A;    CHOLECYSTECTOMY      COLONOSCOPY      ENDOSCOPY N/A 9/14/2023    Procedure: ESOPHAGOGASTRODUODENOSCOPY;  Surgeon: Ulysses Lamas MD;  Location: [facility] ENDOSCOPY;  Service: Gastroenterology;  Laterality: N/A;  gastritis, inflammatory gastric polyp    HYSTERECTOMY      ROTATOR CUFF REPAIR Left       General Information       Row Name 03/22/24 1339          OT Time and Intention    Document Type evaluation  -MS     Mode of Treatment occupational therapy  -MS       Row Name 03/22/24 9613          General Information    Patient Profile Reviewed yes  -MS     Prior Level of Function independent:;ADL's  -MS     Existing Precautions/Restrictions fall;oxygen therapy device and L/min  2L O2  -MS     Barriers to Rehab none  identified  -MS       Row Name 03/22/24 1339          Occupational Profile    Reason for Services/Referral (Occupational Profile) Pt is an 84 y/o F admitted to [facility] 3/21/24 s/p fall. Hospital dx A.fib with RVR. Pt found to have nasal bones and septum fx, nasal bridge laceration which was sutured in ED. CT head (-) acute. CT face (+) nondisplaced fx of jonah nasal bones, anterior bony nasal septum. CT cervical spine (-) acute. XR L foot/ankle (-) acute. CXR (-) acute. XR pelvis (-) acute. XR lumbar spine (-) acute. PMHx significant for CKDIII, HTN, pacemaker in place, depressive disorder, fibromyalgia, CHF, hx falls, DDD, and polyneuropathy. At baseline pt resides alone in [facility] with 0 VLADIMIR. Pt typically (I) with ADLs and uses of quad cane for mobility.  -MS     Environmental Supports and Barriers (Occupational Profile) supportive family  -MS       Row Name 03/22/24 1339          Living Environment    People in Home alone  -MS       Row Name 03/22/24 1339          Home Main Entrance    Number of Stairs, Main Entrance none  -MS       Row Name 03/22/24 1339          Stairs Within Home, Primary    Number of Stairs, Within Home, Primary none  -MS       Row Name 03/22/24 1339          Cognition    Orientation Status (Cognition) disoriented to;time  disoriented to year, able to self correct with place  -MS       Row Name 03/22/24 1339          Safety Issues, Functional Mobility    Impairments Affecting Function (Mobility) balance;endurance/activity tolerance;pain;strength;shortness of breath  -MS               User Key  (r) = Recorded By, (t) = Taken By, (c) = Cosigned By      Initials Name Provider Type    MS Chrystal Austin, OT Occupational Therapist                     Mobility/ADL's       Row Name 03/22/24 1341          Bed Mobility    Bed Mobility supine-sit  -MS     Supine-Sit [place] (Bed Mobility) standby assist  -MS     Assistive Device (Bed Mobility) head of bed elevated;bed rails  -MS       Row Name 03/22/24 1341           Transfers    Transfers sit-stand transfer  -MS       Row Name 03/22/24 1341          Bed-Chair Transfer    Bed-Chair [place] (Transfers) contact guard  -MS     Assistive Device (Bed-Chair Transfers) walker, front-wheeled  -MS       Row Name 03/22/24 1341          Sit-Stand Transfer    Sit-Stand [place] (Transfers) contact guard  -MS     Assistive Device (Sit-Stand Transfers) walker, front-wheeled  -MS               User Key  (r) = Recorded By, (t) = Taken By, (c) = Cosigned By      Initials Name Provider Type    Chrystal Tinoco OT Occupational Therapist                   Obj/Interventions       Row Name 03/22/24 1342          Sensory Assessment (Somatosensory)    Sensory Assessment (Somatosensory) sensation intact  -MS       Row Name 03/22/24 1342          Range of Motion Comprehensive    Comment, General Range of Motion BUE WFL  -MS       Row Name 03/22/24 1342          Strength Comprehensive (MMT)    Comment, General Manual Muscle Testing (MMT) Assessment BUE grossly 3+/5  -MS       Row Name 03/22/24 1342          Balance    Balance Assessment sitting static balance;sitting dynamic balance;standing static balance;standing dynamic balance  -MS     Static Sitting Balance standby assist  -MS     Dynamic Sitting Balance standby assist  -MS     Position, Sitting Balance unsupported;sitting edge of bed  -MS     Static Standing Balance contact guard  -MS     Dynamic Standing Balance contact guard  -MS     Position/Device Used, Standing Balance supported;walker, front-wheeled  -MS               User Key  (r) = Recorded By, (t) = Taken By, (c) = Cosigned By      Initials Name Provider Type    Chrystal Tinoco OT Occupational Therapist                   Goals/Plan       Row Name 03/22/24 1348          Bed Mobility Goal 1 (OT)    Activity/Assistive Device (Bed Mobility Goal 1, OT) bed mobility activities, all  -MS     [place] Level/Cues Needed (Bed Mobility Goal 1, OT) modified independence  -MS      Time Frame (Bed Mobility Goal 1, OT) long term goal (LTG);2 weeks  -MS     Progress/Outcomes (Bed Mobility Goal 1, OT) new goal  -MS       Row Name 03/22/24 1348          Transfer Goal 1 (OT)    Activity/Assistive Device (Transfer Goal 1, OT) transfers, all  -MS     [place] Level/Cues Needed (Transfer Goal 1, OT) modified independence  -MS     Time Frame (Transfer Goal 1, OT) long term goal (LTG);2 weeks  -MS     Progress/Outcome (Transfer Goal 1, OT) new goal  -MS       Row Name 03/22/24 1348          Dressing Goal 1 (OT)    Activity/Device (Dressing Goal 1, OT) lower body dressing  -MS     [place]/Cues Needed (Dressing Goal 1, OT) modified independence  -MS     Time Frame (Dressing Goal 1, OT) long term goal (LTG);2 weeks  -MS     Progress/Outcome (Dressing Goal 1, OT) new goal  -MS       Row Name 03/22/24 1348          Toileting Goal 1 (OT)    Activity/Device (Toileting Goal 1, OT) toileting skills, all  -MS     [place] Level/Cues Needed (Toileting Goal 1, OT) modified independence  -MS     Time Frame (Toileting Goal 1, OT) long term goal (LTG);2 weeks  -MS     Progress/Outcome (Toileting Goal 1, OT) new goal  -MS       Row Name 03/22/24 1345          Therapy Assessment/Plan (OT)    Planned Therapy Interventions (OT) activity tolerance training;adaptive equipment training;BADL retraining;functional balance retraining;IADL retraining;occupation/activity based interventions;passive ROM/stretching;patient/caregiver education/training;transfer/mobility retraining;strengthening exercise;ROM/therapeutic exercise  -MS               User Key  (r) = Recorded By, (t) = Taken By, (c) = Cosigned By      Initials Name Provider Type    Chrystal Tinoco, OT Occupational Therapist                   Clinical Impression       Row Name 03/22/24 134          Pain Assessment    Pretreatment Pain Rating 9/10  -MS     Posttreatment Pain Rating 9/10  -MS     Pain Location - head  -MS     Pain Intervention(s)  Repositioned;Emotional support  -MS       Row Name 03/22/24 8901          Plan of Care Review    Plan of Care Reviewed With patient  -MS     Progress no change  -MS     Outcome Evaluation Pt is an 84 y/o F admitted to [facility] 3/21/24 s/p fall. Hospital dx A.fib with RVR. Pt found to have nasal bones and septum fx, nasal bridge laceration which was sutured in ED. CT head (-) acute. CT face (+) nondisplaced fx of jonah nasal bones, anterior bony nasal septum. CT cervical spine (-) acute. XR L foot/ankle (-) acute. CXR (-) acute. XR pelvis (-) acute. XR lumbar spine (-) acute. PMHx significant for CKDIII, HTN, pacemaker in place, depressive disorder, fibromyalgia, CHF, hx falls, DDD, and polyneuropathy. At baseline pt resides alone in [facility] with 0 VLADIMIR. Pt typically (I) with ADLs and uses of quad cane for mobility. This date pt A&Ox3, disoriented to year and able to self correct when reporting place. Pt on RA with nasal canula on top of head, placing 2L back  on pt, increasing from 90% to 95%. Pt in supine and c/o 9/10 pain in head from fall. Pt requires SBA for bed mobility, CGA to come to standing and to transfer from bed to chair. Pt noted to be tachycardic with minimal activity, therefore further activity was not progressed at this time. Pt is far below baseline and is at high risk for fall. OT recommending SNF when medically appropriate for d/c, OT will follow while admitted.  -MS       Row Name 03/22/24 0955          Therapy Assessment/Plan (OT)    Rehab Potential (OT) good, to achieve stated therapy goals  -MS     Criteria for Skilled Therapeutic Interventions Met (OT) yes;meets criteria;skilled treatment is necessary  -MS     Therapy Frequency (OT) 3 times/wk  -MS     Predicted Duration of Therapy Intervention (OT) until d/c  -MS       Row Name 03/22/24 8871          Therapy Plan Review/Discharge Plan (OT)    Anticipated Discharge Disposition (OT) skilled nursing facility  -MS       Row Name 03/22/24 6475          Vital  Signs    Pre Systolic BP Rehab 86  -MS     Pre Treatment Diastolic BP 52  -MS     Intra Systolic BP Rehab 102  -MS     Intra Treatment Diastolic BP 52  -MS     Post Systolic BP Rehab 109  -MS     Post Treatment Diastolic BP 95  -MS     Pretreatment Heart Rate (beats/min) 90  -MS     Intratreatment Heart Rate (beats/min) 109  -MS     Posttreatment Heart Rate (beats/min) 89  -MS     Intratreatment Resp Rate (breaths/min) 16  -MS     Posttreatment Resp Rate (breaths/min) 18  -MS     Pre SpO2 (%) 90  -MS     O2 Delivery Pre Treatment room air  -MS     Intra SpO2 (%) 95  -MS     O2 Delivery Intra Treatment nasal cannula  -MS     Post SpO2 (%) 98  -MS     O2 Delivery Post Treatment nasal cannula  -MS     Pre Patient Position Supine  -MS     Intra Patient Position Standing  -MS     Post Patient Position Sitting  -MS       Row Name 03/22/24 1343          Positioning and Restraints    Pre-Treatment Position in bed  -MS     Post Treatment Position chair  -MS     In Chair notified nsg;reclined;call light within reach;encouraged to call for assist;exit alarm on;with family/caregiver;legs elevated  -MS               User Key  (r) = Recorded By, (t) = Taken By, (c) = Cosigned By      Initials Name Provider Type    Chrystal Tinoco, OT Occupational Therapist                   Outcome Measures       Row Name 03/22/24 1348          How much help from another is currently needed...    Putting on and taking off regular lower body clothing? 2  -MS     Bathing (including washing, rinsing, and drying) 2  -MS     Toileting (which includes using toilet bed pan or urinal) 2  -MS     Putting on and taking off regular upper body clothing 3  -MS     Taking care of personal grooming (such as brushing teeth) 3  -MS     Eating meals 4  -MS     AM-PAC 6 Clicks Score (OT) 16  -MS       Row Name 03/22/24 1144          Modified Derek Scale    Pre-Stroke Modified [place] Scale 0 - No Symptoms at all.  -BR (r) BH (t) BR (c)     Modified [place] Scale 4  - Moderately severe disability.  Unable to walk without assistance, and unable to attend to own bodily needs without assistance.  -BR (r) BH (t) BR (c)       Row Name 03/22/24 1348 03/22/24 1144       Functional Assessment    Outcome Measure Options AM-PAC 6 Clicks Daily Activity (OT)  -MS Modified Derek  -BR (r) BH (t) BR (c)              User Key  (r) = Recorded By, (t) = Taken By, (c) = Cosigned By      Initials Name Provider Type    MS Chrystal Austin, OT Occupational Therapist    Catalina Chaves, PT Physical Therapist    Delmi Muhammad, PT Student PT Student                    Occupational Therapy Education       Title: PT OT SLP Therapies (Done)       Topic: Occupational Therapy (Done)       Point: ADL training (Done)       Description:   Instruct learner(s) on proper safety adaptation and remediation techniques during self care or transfers.   Instruct in proper use of assistive devices.                  Learning Progress Summary             Patient Acceptance, E,TB, VU by MS at 3/22/2024 1348                         Point: Precautions (Done)       Description:   Instruct learner(s) on prescribed precautions during self-care and functional transfers.                  Learning Progress Summary             Patient Acceptance, E,TB, VU by MS at 3/22/2024 1348                         Point: Body mechanics (Done)       Description:   Instruct learner(s) on proper positioning and spine alignment during self-care, functional mobility activities and/or exercises.                  Learning Progress Summary             Patient Acceptance, E,TB, VU by MS at 3/22/2024 1348                                         User Key       Initials Effective Dates Name Provider Type Discipline    MS 07/13/22 -  Chrystal Austin, OT Occupational Therapist OT                  OT Recommendation and Plan  Planned Therapy Interventions (OT): activity tolerance training, adaptive equipment training, BADL retraining, functional  balance retraining, IADL retraining, occupation/activity based interventions, passive ROM/stretching, patient/caregiver education/training, transfer/mobility retraining, strengthening exercise, ROM/therapeutic exercise  Therapy Frequency (OT): 3 times/wk  Plan of Care Review  Plan of Care Reviewed With: patient  Progress: no change  Outcome Evaluation: Pt is an 82 y/o F admitted to [facility] 3/21/24 s/p fall. Hospital dx A.fib with RVR. Pt found to have nasal bones and septum fx, nasal bridge laceration which was sutured in ED. CT head (-) acute. CT face (+) nondisplaced fx of jonah nasal bones, anterior bony nasal septum. CT cervical spine (-) acute. XR L foot/ankle (-) acute. CXR (-) acute. XR pelvis (-) acute. XR lumbar spine (-) acute. PMHx significant for CKDIII, HTN, pacemaker in place, depressive disorder, fibromyalgia, CHF, hx falls, DDD, and polyneuropathy. At baseline pt resides alone in [facility] with 0 VLADIMIR. Pt typically (I) with ADLs and uses of quad cane for mobility. This date pt A&Ox3, disoriented to year and able to self correct when reporting place. Pt on RA with nasal canula on top of head, placing 2L back  on pt, increasing from 90% to 95%. Pt in supine and c/o 9/10 pain in head from fall. Pt requires SBA for bed mobility, CGA to come to standing and to transfer from bed to chair. Pt noted to be tachycardic with minimal activity, therefore further activity was not progressed at this time. Pt is far below baseline and is at high risk for fall. OT recommending SNF when medically appropriate for d/c, OT will follow while admitted.     Time Calculation:                   Chrystal Austin OT  3/22/2024

## 2024-03-22 NOTE — CONSULTS
CARDIOLOGY CONSULT:    Catalina Moreno  1940  female  [id number]      Referring Provider: Hospitalist  Reason for Consultation: S/p fall and atrial fibrillation    Patient Care Team:  Provider, No Known as PCP - Flash Sanders MD as Consulting Physician (Cardiology)    Chief complaint s/p fall    Subjective .     History of present illness:  Catalina Moreno is a 83 y.o. female with history of paroxysmal fibrillation chronic renal insufficiency HFrEF hypertension hyperlipidemia coronary artery disease presented to the hospital after a fall and has atrial fibrillation with rapid response.  Patient states that she does not have any symptoms of chest pain or any shortness of breath.  No complaint of any palpitations.  No dizziness syncope or swelling of the feet.  Patient has been taking her medicines regular.  In the ER patient had atrial fibrillation with rapid response.  She was admitted and started on IV Cardizem.    Review of Systems   Constitutional: Negative for fever and malaise/fatigue.   HENT:  Negative for ear pain and nosebleeds.    Eyes:  Negative for blurred vision and double vision.   Cardiovascular:  Negative for chest pain, dyspnea on exertion and palpitations.   Respiratory:  Negative for cough and shortness of breath.    Skin:  Negative for rash.   Musculoskeletal:  Positive for falls. Negative for joint pain.   Gastrointestinal:  Negative for abdominal pain, nausea and vomiting.   Neurological:  Negative for focal weakness and headaches.   Psychiatric/Behavioral:  Negative for depression. The patient is not nervous/anxious.    All other systems reviewed and are negative.      History  Past Medical History:   Diagnosis Date    Acquired spondylolisthesis of lumbosacral region     Acute kidney injury 07/22/2022    Anxiety     Arthritis     Atrial fibrillation     paroxysmal    Broken shoulder     left-- due to fall 11-7-19 was at [facility]    Chronic pain disorder     Chronic systolic CHF  (congestive heart failure) 01/05/2023    EF 36-40%    Coronary artery disease involving native coronary artery of native heart without angina pectoris 04/02/2021    nonobstructive    DDD (degenerative disc disease), cervical     DDD (degenerative disc disease), lumbar     Depression     Edema     9/2020 foot    GERD (gastroesophageal reflux disease)     H/O fall     Heart failure with mid-range ejection fraction 03/05/2022    EF 45% per 2D echo    Hypertension     Hypothyroidism     Insomnia     Low back pain     Moderate mitral regurgitation 01/05/2023    Neuropathy     Nonrheumatic tricuspid valve regurgitation     Pain in both feet     Polypharmacy     PONV (postoperative nausea and vomiting)     Pulmonary hypertension     Radiculopathy     Restless legs     Sacroiliitis     Sick sinus syndrome     Added automatically from request for surgery [id number]    Spondylolisthesis     Stage 3a chronic kidney disease     Urinary incontinence     Vitamin D deficiency        Past Surgical History:   Procedure Laterality Date    BACK SURGERY  07/19/2018    PDC &  PSF L3-L5 insitu    CARDIAC CATHETERIZATION N/A 4/2/2021    Procedure: Cardiac Catheterization/Vascular Study;  Surgeon: Barrett Evans MD;  Location: [facility] CATH INVASIVE LOCATION;  Service: Cardiovascular;  Laterality: N/A;    CARDIAC ELECTROPHYSIOLOGY PROCEDURE N/A 1/17/2023    Procedure: Pacemaker DC new;  Surgeon: Baljeet Valero MD;  Location: [facility] CATH INVASIVE LOCATION;  Service: Cardiovascular;  Laterality: N/A;    CHOLECYSTECTOMY      COLONOSCOPY      ENDOSCOPY N/A 9/14/2023    Procedure: ESOPHAGOGASTRODUODENOSCOPY;  Surgeon: Ulysses Lamas MD;  Location: [facility] ENDOSCOPY;  Service: Gastroenterology;  Laterality: N/A;  gastritis, inflammatory gastric polyp    HYSTERECTOMY      ROTATOR CUFF REPAIR Left        Family History   Problem Relation Age of Onset    Cancer Father     Diabetes Son     Cancer Paternal Aunt     Heart disease  Paternal Aunt     Cancer Paternal Uncle        Social History     Tobacco Use    Smoking status: Never    Smokeless tobacco: Never   Vaping Use    Vaping status: Never Used   Substance Use Topics    Alcohol use: No    Drug use: Never        Medications Prior to Admission   Medication Sig Dispense Refill Last Dose    apixaban (ELIQUIS) 5 MG tablet tablet Take 1 tablet by mouth Every 12 (Twelve) Hours. Indications: Atrial Fibrillation 60 tablet 2     atorvastatin (LIPITOR) 10 MG tablet Take 1 tablet by mouth Daily.       dexAMETHasone (DECADRON) 1.5 MG tablet Take 1 tablet by mouth Daily.       dilTIAZem CD (CARDIZEM CD) 240 MG 24 hr capsule Take 1 capsule by mouth Daily. 30 capsule 0     lidocaine (LIDODERM) 5 % Place 2 patches on the skin as directed by provider Daily. Remove & Discard patch within 12 hours or as directed by MD 30 each 0     metoprolol succinate XL (TOPROL-XL) 25 MG 24 hr tablet Take 1 tablet by mouth Daily.       rOPINIRole (REQUIP) 0.25 MG tablet Take 1 tablet by mouth Every Night. Take 1 hour before bedtime.  Indications: Restless Leg Syndrome 30 tablet 0     sacubitril-valsartan (Entresto) 24-26 MG tablet Take 1 tablet by mouth 2 (Two) Times a Day.       sennosides-docusate (PERICOLACE) 8.6-50 MG per tablet Take 1 tablet by mouth Daily. 30 tablet 0        Allergies: Baclofen, Codeine, Ibuprofen, Methocarbamol, Naproxen, Tizanidine hcl, and Tolmetin    Scheduled Meds:apixaban, 5 mg, Oral, Q12H  atorvastatin, 10 mg, Oral, Daily  Lidocaine, 2 patch, Transdermal, Q24H  metoprolol succinate XL, 25 mg, Oral, Daily  rOPINIRole, 0.25 mg, Oral, Nightly  sacubitril-valsartan, 1 tablet, Oral, BID  sennosides-docusate, 1 tablet, Oral, Daily      Continuous Infusions:dilTIAZem, 5-15 mg/hr, Last Rate: Stopped (03/22/24 1011)      PRN Meds:.  HYDROcodone-acetaminophen    influenza vaccine    melatonin    [COMPLETED] Insert Peripheral IV **AND** sodium chloride    Objective     VITAL SIGNS  Vitals:    03/22/24  "1030 03/22/24 1045 03/22/24 1100 03/22/24 1148   BP: 96/43 (!) 89/69 (!) 78/44    Pulse: 78 87 95    Resp:       Temp:    98 °F (36.7 °C)   TempSrc:    Oral   SpO2: 93% 91% 90%    Weight:       Height:           Flowsheet Rows      Flowsheet Row First Filed Value   Admission Height 160 cm (63\") Documented at 03/21/2024 1537   Admission Weight 69.4 kg (153 lb) Documented at 03/21/2024 1537             TELEMETRY: Atrial fibrillation with controlled ventricular response    Physical Exam:  Constitutional:       Appearance: Well-developed.   Eyes:      General: No scleral icterus.     Conjunctiva/sclera: Conjunctivae normal.      Pupils: Pupils are equal, round, and reactive to light.   HENT:      Head: Normocephalic and atraumatic.   Neck:      Vascular: No carotid bruit or JVD.   Pulmonary:      Effort: Pulmonary effort is normal.      Breath sounds: Normal breath sounds. No wheezing. No rales.   Cardiovascular:      Normal rate. Irregularly irregular rhythm.   Pulses:     Intact distal pulses.   Abdominal:      General: Bowel sounds are normal.      Palpations: Abdomen is soft.   Musculoskeletal: Normal range of motion.      Cervical back: Normal range of motion and neck supple. Skin:     General: Skin is warm and dry.      Findings: No rash.   Neurological:      Mental Status: Alert.      Comments: No focal deficits          Results Review:   I reviewed the patient's new clinical results.  Lab Results (last 24 hours)       Procedure Component Value Units Date/Time    High Sensitivity Troponin T 2Hr [[id number]]  (Normal) Collected: 03/21/24 2000    Specimen: Blood Updated: 03/21/24 2034     HS Troponin T 11 ng/L      Troponin T Delta -1 ng/L     Narrative:      High Sensitive Troponin T Reference Range:  <14.0 ng/L- Negative Female for AMI  <22.0 ng/L- Negative Male for AMI  >=14 - Abnormal Female indicating possible myocardial injury.  >=22 - Abnormal Male indicating possible myocardial injury.   Clinicians would " have to utilize clinical acumen, EKG, Troponin, and serial changes to determine if it is an Acute Myocardial Infarction or myocardial injury due to an underlying chronic condition.         Magnesium [[id number]]  (Normal) Collected: 03/21/24 1732    Specimen: Blood Updated: 03/21/24 1914     Magnesium 2.0 mg/dL     Comprehensive Metabolic Panel [[id number]]  (Abnormal) Collected: 03/21/24 1732    Specimen: Blood Updated: 03/21/24 1809     Glucose 109 mg/dL      BUN 18 mg/dL      Creatinine 0.88 mg/dL      Sodium 141 mmol/L      Potassium 4.3 mmol/L      Chloride 102 mmol/L      CO2 24.0 mmol/L      Calcium 9.6 mg/dL      Total Protein 7.6 g/dL      Albumin 4.5 g/dL      ALT (SGPT) 7 U/L      AST (SGOT) 16 U/L      Alkaline Phosphatase 90 U/L      Total Bilirubin 1.2 mg/dL      Globulin 3.1 gm/dL      A/G Ratio 1.5 g/dL      BUN/Creatinine Ratio 20.5     Anion Gap 15.0 mmol/L      eGFR 65.7 mL/min/1.73     Narrative:      GFR Normal >60  Chronic Kidney Disease <60  Kidney Failure <15    The GFR formula is only valid for adults with stable renal function between ages 18 and 70.    High Sensitivity Troponin T [[id number]]  (Normal) Collected: 03/21/24 1732    Specimen: Blood Updated: 03/21/24 1809     HS Troponin T 12 ng/L     Narrative:      High Sensitive Troponin T Reference Range:  <14.0 ng/L- Negative Female for AMI  <22.0 ng/L- Negative Male for AMI  >=14 - Abnormal Female indicating possible myocardial injury.  >=22 - Abnormal Male indicating possible myocardial injury.   Clinicians would have to utilize clinical acumen, EKG, Troponin, and serial changes to determine if it is an Acute Myocardial Infarction or myocardial injury due to an underlying chronic condition.         aPTT [[id number]]  (Abnormal) Collected: 03/21/24 1732    Specimen: Blood Updated: 03/21/24 1752     PTT 26.8 seconds     Protime-INR [[id number]]  (Normal) Collected: 03/21/24 1732    Specimen: Blood Updated: 03/21/24 1752     Protime 11.3  Seconds      INR 1.04    CBC & Differential [[id number]]  (Abnormal) Collected: 03/21/24 1732    Specimen: Blood Updated: 03/21/24 1738    Narrative:      The following orders were created for panel order CBC & Differential.  Procedure                               Abnormality         Status                     ---------                               -----------         ------                     CBC Auto Differential[[id number]]        Abnormal            Final result                 Please view results for these tests on the individual orders.    CBC Auto Differential [[id number]]  (Abnormal) Collected: 03/21/24 1732    Specimen: Blood Updated: 03/21/24 1738     WBC 10.43 10*3/mm3      RBC 4.36 10*6/mm3      Hemoglobin 11.8 g/dL      Hematocrit 38.2 %      MCV 87.6 fL      MCH 27.1 pg      MCHC 30.9 g/dL      RDW 15.9 %      RDW-SD 51.1 fl      MPV 10.4 fL      Platelets 276 10*3/mm3      Neutrophil % 75.3 %      Lymphocyte % 16.5 %      Monocyte % 6.1 %      Eosinophil % 0.8 %      Basophil % 0.9 %      Immature Grans % 0.4 %      Neutrophils, Absolute 7.86 10*3/mm3      Lymphocytes, Absolute 1.72 10*3/mm3      Monocytes, Absolute 0.64 10*3/mm3      Eosinophils, Absolute 0.08 10*3/mm3      Basophils, Absolute 0.09 10*3/mm3      Immature Grans, Absolute 0.04 10*3/mm3      nRBC 0.0 /100 WBC             Imaging Results (Last 24 Hours)       Procedure Component Value Units Date/Time    XR Spine Lumbar Complete 4+VW [[id number]] Collected: 03/21/24 1921     Updated: 03/21/24 1929    Narrative:      XR PELVIS 1 OR 2 VW, XR SPINE LUMBAR COMPLETE 4+VW    Date of Exam: 3/21/2024 7:16 PM EDT    Indication: trauma    Comparison: None available.    Findings:  Pelvis:  The hips are maintained in alignment. Iliopectineal and ilioischial lines are intact. No fracture. No dislocation.    Lumbar spine:  There are 5 nonrib-bearing lumbar-type vertebral bodies. Vertebral bodies are maintained in height. Negative for acute fracture.  Mild convex right dextrocurvature of the lumbar spine centered at L2. Multilevel disc narrowing in the lumbar spine. Mild   anterolisthesis of L3 on L4 and L4 on L5 similar to prior CT. Multilevel disc narrowing throughout the lumbar spine. Facet arthritis in the lower lumbar spine most pronounced at L4-5 and L5-S1.      Impression:      Impression:  Pelvis:  No acute osseous abnormality.    Lumbar spine:  1. Negative for acute osseous abnormality.  2. Lumbar degenerative findings above.      Electronically Signed: Edil Joshua MD    3/21/2024 7:27 PM EDT    Workstation ID: [id number]    XR Pelvis 1 or 2 View [[id number]] Collected: 03/21/24 1921     Updated: 03/21/24 1929    Narrative:      XR PELVIS 1 OR 2 VW, XR SPINE LUMBAR COMPLETE 4+VW    Date of Exam: 3/21/2024 7:16 PM EDT    Indication: trauma    Comparison: None available.    Findings:  Pelvis:  The hips are maintained in alignment. Iliopectineal and ilioischial lines are intact. No fracture. No dislocation.    Lumbar spine:  There are 5 nonrib-bearing lumbar-type vertebral bodies. Vertebral bodies are maintained in height. Negative for acute fracture. Mild convex right dextrocurvature of the lumbar spine centered at L2. Multilevel disc narrowing in the lumbar spine. Mild   anterolisthesis of L3 on L4 and L4 on L5 similar to prior CT. Multilevel disc narrowing throughout the lumbar spine. Facet arthritis in the lower lumbar spine most pronounced at L4-5 and L5-S1.      Impression:      Impression:  Pelvis:  No acute osseous abnormality.    Lumbar spine:  1. Negative for acute osseous abnormality.  2. Lumbar degenerative findings above.      Electronically Signed: Edil Joshua MD    3/21/2024 7:27 PM EDT    Workstation ID: [id number]    XR Chest 1 View [[id number]] Collected: 03/21/24 1919     Updated: 03/21/24 1922    Narrative:      XR CHEST 1 VW    Date of Exam: 3/21/2024 7:15 PM EDT    Indication: tachcardia    Comparison: 9/18/2023    Findings:  Left-sided  AICD noted. Stable mild cardiomegaly. Lungs are without consolidation. Patient rotated to the right. Negative for pneumothorax. No pleural effusion. Osseous structures appear grossly intact.      Impression:      Impression:  No acute process.      Electronically Signed: Edil Joshua MD    3/21/2024 7:20 PM EDT    Workstation ID: [id number]    XR Ankle 3+ View Left [[id number]] Collected: 03/21/24 1733     Updated: 03/21/24 1736    Narrative:      XR ANKLE 3+ VW LEFT, XR FOOT 3+ VW LEFT    Date of Exam: 3/21/2024 5:09 PM EDT    Indication: trauma    Comparison: None available.    Findings:  No evidence of acute fracture nor dislocation. Alignment is normal. No degenerative arthrosis throughout. Soft tissues are unremarkable.      Impression:      Impression:  No acute osseous injury identified.      Electronically Signed: Kirill Catalan MD    3/21/2024 5:34 PM EDT    Workstation ID: [id number]    XR Foot 3+ View Left [[id number]] Collected: 03/21/24 1733     Updated: 03/21/24 1736    Narrative:      XR ANKLE 3+ VW LEFT, XR FOOT 3+ VW LEFT    Date of Exam: 3/21/2024 5:09 PM EDT    Indication: trauma    Comparison: None available.    Findings:  No evidence of acute fracture nor dislocation. Alignment is normal. No degenerative arthrosis throughout. Soft tissues are unremarkable.      Impression:      Impression:  No acute osseous injury identified.      Electronically Signed: Kirill Catalan MD    3/21/2024 5:34 PM EDT    Workstation ID: [id number]    CT Head Without Contrast [[id number]] Collected: 03/21/24 1653     Updated: 03/21/24 1704    Narrative:      CT HEAD WO CONTRAST, CT CERVICAL SPINE WO CONTRAST, CT FACIAL BONES WO CONTRAST    Date of Exam: 3/21/2024 4:46 PM EDT    Indication: trauma.    Comparison: Noncontrast head CT dated 7/21/2022    Technique: Axial CT images were obtained of the head without contrast administration.  Coronal reconstructions were performed.  Automated exposure control and iterative  reconstruction methods were used.        CT HEAD WITHOUT IV CONTRAST    FINDINGS:    Foci of periventricular and subcortical white matter hypoattenuation are consistent with chronic, microvascular ischemic change. There is age-related loss of brain parenchymal volume with prominence of sulcation and ventriculomegaly. No significant mass   effect, midline shift, intracranial hemorrhage, or hydrocephalus is identified. No extra-axial fluid collection is identified.         Impression:      1.No acute intracranial abnormality is identified.  2.Findings compatible with chronic microvascular ischemic change and diffuse cortical atrophy.    -------    CT FACE WITHOUT IV CONTRAST    FINDINGS:  There are nondisplaced fractures of the bilateral nasal bones as well as the anterior bony nasal septum. There is soft tissue swelling across the nasal bridge. Bony orbits, globes, retrobulbar soft tissues, and optic nerves appear unremarkable   bilaterally. Paranasal sinuses and mastoid air cells appear well aerated without air-fluid levels identified. The pterygoid plates and zygomatic arches are intact. Superficial soft tissues are without significant abnormality. Limited visualization of the   intracranial contents is unremarkable.    IMPRESSION:  Nondisplaced fractures of the bilateral nasal bones as well as the anterior bony nasal septum.    -------    CT CERVICAL SPINE WITHOUT IV CONTRAST    FINDINGS:  No acute fracture is identified. There is moderate cervical spondylosis, and there is grade 1 anterolisthesis at C3-C4 and C7-T1. Vertebral body heights are maintained. The craniocervical and atlantoaxial junctions appear within normal limits. The   prevertebral and paraspinal soft tissues are without focal abnormality. Left chest wall pacemaker is partially imaged.    The visualized lung apices are clear.    IMPRESSION:  1.No acute fracture is identified within the cervical spine.  2.Moderate cervical spondylosis with grade 1  anterolisthesis at C3-C4 and C7-T1.          Electronically Signed: Jimmy Collins MD    3/21/2024 5:02 PM EDT    Workstation ID: [id number]    CT Cervical Spine Without Contrast [[id number]] Collected: 03/21/24 1653     Updated: 03/21/24 1704    Narrative:      CT HEAD WO CONTRAST, CT CERVICAL SPINE WO CONTRAST, CT FACIAL BONES WO CONTRAST    Date of Exam: 3/21/2024 4:46 PM EDT    Indication: trauma.    Comparison: Noncontrast head CT dated 7/21/2022    Technique: Axial CT images were obtained of the head without contrast administration.  Coronal reconstructions were performed.  Automated exposure control and iterative reconstruction methods were used.        CT HEAD WITHOUT IV CONTRAST    FINDINGS:    Foci of periventricular and subcortical white matter hypoattenuation are consistent with chronic, microvascular ischemic change. There is age-related loss of brain parenchymal volume with prominence of sulcation and ventriculomegaly. No significant mass   effect, midline shift, intracranial hemorrhage, or hydrocephalus is identified. No extra-axial fluid collection is identified.         Impression:      1.No acute intracranial abnormality is identified.  2.Findings compatible with chronic microvascular ischemic change and diffuse cortical atrophy.    -------    CT FACE WITHOUT IV CONTRAST    FINDINGS:  There are nondisplaced fractures of the bilateral nasal bones as well as the anterior bony nasal septum. There is soft tissue swelling across the nasal bridge. Bony orbits, globes, retrobulbar soft tissues, and optic nerves appear unremarkable   bilaterally. Paranasal sinuses and mastoid air cells appear well aerated without air-fluid levels identified. The pterygoid plates and zygomatic arches are intact. Superficial soft tissues are without significant abnormality. Limited visualization of the   intracranial contents is unremarkable.    IMPRESSION:  Nondisplaced fractures of the bilateral nasal bones as well as the  anterior bony nasal septum.    -------    CT CERVICAL SPINE WITHOUT IV CONTRAST    FINDINGS:  No acute fracture is identified. There is moderate cervical spondylosis, and there is grade 1 anterolisthesis at C3-C4 and C7-T1. Vertebral body heights are maintained. The craniocervical and atlantoaxial junctions appear within normal limits. The   prevertebral and paraspinal soft tissues are without focal abnormality. Left chest wall pacemaker is partially imaged.    The visualized lung apices are clear.    IMPRESSION:  1.No acute fracture is identified within the cervical spine.  2.Moderate cervical spondylosis with grade 1 anterolisthesis at C3-C4 and C7-T1.          Electronically Signed: Jimmy Collins MD    3/21/2024 5:02 PM EDT    Workstation ID: [id number]    CT Facial Bones Without Contrast [[id number]] Collected: 03/21/24 1653     Updated: 03/21/24 1704    Narrative:      CT HEAD WO CONTRAST, CT CERVICAL SPINE WO CONTRAST, CT FACIAL BONES WO CONTRAST    Date of Exam: 3/21/2024 4:46 PM EDT    Indication: trauma.    Comparison: Noncontrast head CT dated 7/21/2022    Technique: Axial CT images were obtained of the head without contrast administration.  Coronal reconstructions were performed.  Automated exposure control and iterative reconstruction methods were used.        CT HEAD WITHOUT IV CONTRAST    FINDINGS:    Foci of periventricular and subcortical white matter hypoattenuation are consistent with chronic, microvascular ischemic change. There is age-related loss of brain parenchymal volume with prominence of sulcation and ventriculomegaly. No significant mass   effect, midline shift, intracranial hemorrhage, or hydrocephalus is identified. No extra-axial fluid collection is identified.         Impression:      1.No acute intracranial abnormality is identified.  2.Findings compatible with chronic microvascular ischemic change and diffuse cortical atrophy.    -------    CT FACE WITHOUT IV  CONTRAST    FINDINGS:  There are nondisplaced fractures of the bilateral nasal bones as well as the anterior bony nasal septum. There is soft tissue swelling across the nasal bridge. Bony orbits, globes, retrobulbar soft tissues, and optic nerves appear unremarkable   bilaterally. Paranasal sinuses and mastoid air cells appear well aerated without air-fluid levels identified. The pterygoid plates and zygomatic arches are intact. Superficial soft tissues are without significant abnormality. Limited visualization of the   intracranial contents is unremarkable.    IMPRESSION:  Nondisplaced fractures of the bilateral nasal bones as well as the anterior bony nasal septum.    -------    CT CERVICAL SPINE WITHOUT IV CONTRAST    FINDINGS:  No acute fracture is identified. There is moderate cervical spondylosis, and there is grade 1 anterolisthesis at C3-C4 and C7-T1. Vertebral body heights are maintained. The craniocervical and atlantoaxial junctions appear within normal limits. The   prevertebral and paraspinal soft tissues are without focal abnormality. Left chest wall pacemaker is partially imaged.    The visualized lung apices are clear.    IMPRESSION:  1.No acute fracture is identified within the cervical spine.  2.Moderate cervical spondylosis with grade 1 anterolisthesis at C3-C4 and C7-T1.          Electronically Signed: Jimmy Collins MD    3/21/2024 5:02 PM EDT    Workstation ID: [id number]            EKG      I personally viewed and interpreted the patient's EKG/Telemetry data:    ECHOCARDIOGRAM:  Results for orders placed during the hospital encounter of 09/05/23    Adult Transthoracic Echo Complete W/ Cont if Necessary Per Protocol    Interpretation Summary    Left ventricular ejection fraction appears to be 51 - 55%.    The right ventricular cavity is moderately dilated.    The left atrial cavity is moderately dilated.    The right atrial cavity is mildly  dilated.    There is calcification of the aortic  valve.    Moderate aortic valve regurgitation is present.    Estimated right ventricular systolic pressure from tricuspid regurgitation is mildly elevated (35-45 mmHg).         STRESS MYOVIEW:  Results for orders placed during the hospital encounter of 03/31/21    Stress Test With Myocardial Perfusion One Day    Interpretation Summary  · Findings consistent with a normal ECG stress test.  · Left ventricular ejection fraction is hyperdynamic (Calculated EF > 70%). .  · Impressions are consistent with a study indicating uncertain risk.  · Large apical defect with some reperfusion during the rest images cannot rule out ischemia EF 76% Clinical correlation is recommended.    Electronically signed by KALYANI Tay, 04/01/21, 11:25 AM EDT.       CARDIAC CATHETERIZATION:    Results for orders placed during the hospital encounter of 03/31/21    Cardiac Catheterization/Vascular Study    Narrative  CARDIAC CATHETERIZATION REPORT    DATE OF PROCEDURE: 4/2/2021      INDICATION FOR PROCEDURE: Abnormal stress test    PROCEDURE PERFORMED: Left heart catheterization, coronary angiography,    PROCEDURE COMMENTS:    All the risks and benefits explained with the patient informed consent was obtained from the patient.  Patient was brought to the cardiac catheterization laboratory placed on the table draped and prepped in the usual sterile fashion.  2% lidocaine was used to anesthetize the right groin area.  Using the modified Seldinger technique 5 Montserratian sheath was inserted into the right femoral artery.    5 Montserratian JL4 catheter was used to perform the selective left coronary angiography    5 Montserratian JR4 catheter was used to perform the selective right coronary angiography    Angio-Seal was placed after exchanging five Montserratian sheath with six Montserratian sheath    Patient tolerated procedure well without any immediate complications      FINDINGS:    1. HEMODYNAMICS:  LV end-diastolic pressure 7 mmHg, /80 mmHg.    2. LEFT  VENTRICULOGRAPHY: Not done patient had echocardiogram    3. CORONARY ANGIOGRAPHY:  Dominance right  Left Main: Large-caliber vessel bifurcates into LAD circumflex artery 0% stenosis  LAD: Large-caliber vessel gives rise to couple of medium caliber diagonal arteries multiple septal branches reaches the apex mild luminal irregularities noted less than 10% stenosis  CIRC: Large-caliber vessel gives rise to marginal lateral branches less than 10% stenosis  RCA: Large-caliber dominant artery gives rise to PDA and PL branches less than 5 to 10% stenosis    SUMMARY: No obstructive coronary artery disease    RECOMMENDATIONS: Evaluate for noncardiac causes of symptoms aggressive cardiac risk factor modification       OTHER:         MDM      Atrial fibrillation  Patient presented with atrial fibrillation with rapid response but the heart rate is better now and will continue on home medicines of metoprolol and Eliquis and will stop the Cardizem drip.    History of pacemaker placement  Patient's pacemaker is working very well    Hypertension  Patient blood pressure currently stable on metoprolol    Hyperlipidemia  Patient has been on statins and is followed by the primary care doctor    HFrEF  Patient has history of HFrEF in the past with LV systolic dysfunction but with medical therapy including Entresto and metoprolol her LV function has been normal with a EF of 50 to 55%.    S/p fall  Patient had a fall at home but does not have any dizziness or syncope but she is on anticoagulants and hence precautions will be placed when she is discharged to home.    I discussed the patients findings and my recommendations with patient and nurse    Flash Gonzalez MD  03/22/24  12:09 EDT

## 2024-03-22 NOTE — PLAN OF CARE
Goal Outcome Evaluation:    Patient presented to ED following a fall outside at home. Pt has bruising and swelling under both eyes. She also presented with bruising on her left ankle and a laceration on the bridge of her nose. Pt has not had difficulty breathing throughout the night. 2L NC was placed due to slight O2 desaturation (88%) while sleep.

## 2024-03-22 NOTE — PROGRESS NOTES
"[facility] MEDICINE SERVICE  DAILY PROGRESS NOTE      Patient Name: Catalina Moreno  Date of Admission: 3/21/2024  Today's Date: 03/22/24  Length of Stay: 0  Primary Care Physician: Provider, No Known    Subjective   Patient states she is not doing well today.  She is having some SOB and nausea.  She denies any chest discomfort or palpitations.  No other complaints.  No overnight events.      Objective    Temp:  [97.9 °F (36.6 °C)-98.3 °F (36.8 °C)] 98 °F (36.7 °C)  Heart Rate:  [] 77  Resp:  [16-19] 17  BP: ()/() 100/53  Physical Exam  General: NAD, AAOx3  HEENT: AT NC, EOMI, bilateral periorbital bruising  Neck: No JVD  CVS: S1/S2 present, irregular rhythm, no M/R/G  Lungs: CTA B/L  Abdomen: soft, NT, ND, BS+  Ext: no edema  Skin: no rashes, bruises or discolorations  Neuro: no focal deficits  Psych: Not agitated         Results Review:  I have reviewed the labs, radiology results, and diagnostic studies.    Laboratory Data:   Results from last 7 days   Lab Units 03/21/24  1732   WBC 10*3/mm3 10.43   HEMOGLOBIN g/dL 11.8*   HEMATOCRIT % 38.2   PLATELETS 10*3/mm3 276        Results from last 7 days   Lab Units 03/21/24  1732   SODIUM mmol/L 141   POTASSIUM mmol/L 4.3   CHLORIDE mmol/L 102   CO2 mmol/L 24.0   BUN mg/dL 18   CREATININE mg/dL 0.88   CALCIUM mg/dL 9.6   BILIRUBIN mg/dL 1.2   ALK PHOS U/L 90   ALT (SGPT) U/L 7   AST (SGOT) U/L 16   GLUCOSE mg/dL 109*       Culture Data:   No results found for: \"BLOODCX\"  No results found for: \"URINECX\"  No results found for: \"RESPCX\"  No results found for: \"WOUNDCX\"  No results found for: \"STOOLCX\"  No components found for: \"BODYFLD\"    Radiology Data:   Imaging Results (Last 24 Hours)       Procedure Component Value Units Date/Time    XR Spine Lumbar Complete 4+VW [[id number]] Collected: 03/21/24 1921     Updated: 03/21/24 1929    Narrative:      XR PELVIS 1 OR 2 VW, XR SPINE LUMBAR COMPLETE 4+VW    Date of Exam: 3/21/2024 7:16 PM " EDT    Indication: trauma    Comparison: None available.    Findings:  Pelvis:  The hips are maintained in alignment. Iliopectineal and ilioischial lines are intact. No fracture. No dislocation.    Lumbar spine:  There are 5 nonrib-bearing lumbar-type vertebral bodies. Vertebral bodies are maintained in height. Negative for acute fracture. Mild convex right dextrocurvature of the lumbar spine centered at L2. Multilevel disc narrowing in the lumbar spine. Mild   anterolisthesis of L3 on L4 and L4 on L5 similar to prior CT. Multilevel disc narrowing throughout the lumbar spine. Facet arthritis in the lower lumbar spine most pronounced at L4-5 and L5-S1.      Impression:      Impression:  Pelvis:  No acute osseous abnormality.    Lumbar spine:  1. Negative for acute osseous abnormality.  2. Lumbar degenerative findings above.      Electronically Signed: Edil Joshua MD    3/21/2024 7:27 PM EDT    Workstation ID: [id number]    XR Pelvis 1 or 2 View [[id number]] Collected: 03/21/24 1921     Updated: 03/21/24 1929    Narrative:      XR PELVIS 1 OR 2 VW, XR SPINE LUMBAR COMPLETE 4+VW    Date of Exam: 3/21/2024 7:16 PM EDT    Indication: trauma    Comparison: None available.    Findings:  Pelvis:  The hips are maintained in alignment. Iliopectineal and ilioischial lines are intact. No fracture. No dislocation.    Lumbar spine:  There are 5 nonrib-bearing lumbar-type vertebral bodies. Vertebral bodies are maintained in height. Negative for acute fracture. Mild convex right dextrocurvature of the lumbar spine centered at L2. Multilevel disc narrowing in the lumbar spine. Mild   anterolisthesis of L3 on L4 and L4 on L5 similar to prior CT. Multilevel disc narrowing throughout the lumbar spine. Facet arthritis in the lower lumbar spine most pronounced at L4-5 and L5-S1.      Impression:      Impression:  Pelvis:  No acute osseous abnormality.    Lumbar spine:  1. Negative for acute osseous abnormality.  2. Lumbar degenerative  findings above.      Electronically Signed: Edil Joshua MD    3/21/2024 7:27 PM EDT    Workstation ID: [id number]    XR Chest 1 View [[id number]] Collected: 03/21/24 1919     Updated: 03/21/24 1922    Narrative:      XR CHEST 1 VW    Date of Exam: 3/21/2024 7:15 PM EDT    Indication: tachcardia    Comparison: 9/18/2023    Findings:  Left-sided AICD noted. Stable mild cardiomegaly. Lungs are without consolidation. Patient rotated to the right. Negative for pneumothorax. No pleural effusion. Osseous structures appear grossly intact.      Impression:      Impression:  No acute process.      Electronically Signed: Edil Joshua MD    3/21/2024 7:20 PM EDT    Workstation ID: [id number]    XR Ankle 3+ View Left [[id number]] Collected: 03/21/24 1733     Updated: 03/21/24 1736    Narrative:      XR ANKLE 3+ VW LEFT, XR FOOT 3+ VW LEFT    Date of Exam: 3/21/2024 5:09 PM EDT    Indication: trauma    Comparison: None available.    Findings:  No evidence of acute fracture nor dislocation. Alignment is normal. No degenerative arthrosis throughout. Soft tissues are unremarkable.      Impression:      Impression:  No acute osseous injury identified.      Electronically Signed: Kirill Catalan MD    3/21/2024 5:34 PM EDT    Workstation ID: [id number]    XR Foot 3+ View Left [[id number]] Collected: 03/21/24 1733     Updated: 03/21/24 1736    Narrative:      XR ANKLE 3+ VW LEFT, XR FOOT 3+ VW LEFT    Date of Exam: 3/21/2024 5:09 PM EDT    Indication: trauma    Comparison: None available.    Findings:  No evidence of acute fracture nor dislocation. Alignment is normal. No degenerative arthrosis throughout. Soft tissues are unremarkable.      Impression:      Impression:  No acute osseous injury identified.      Electronically Signed: Kirill Catalan MD    3/21/2024 5:34 PM EDT    Workstation ID: [id number]    CT Head Without Contrast [[id number]] Collected: 03/21/24 1653     Updated: 03/21/24 1704    Narrative:      CT HEAD WO CONTRAST, CT CERVICAL  SPINE WO CONTRAST, CT FACIAL BONES WO CONTRAST    Date of Exam: 3/21/2024 4:46 PM EDT    Indication: trauma.    Comparison: Noncontrast head CT dated 7/21/2022    Technique: Axial CT images were obtained of the head without contrast administration.  Coronal reconstructions were performed.  Automated exposure control and iterative reconstruction methods were used.        CT HEAD WITHOUT IV CONTRAST    FINDINGS:    Foci of periventricular and subcortical white matter hypoattenuation are consistent with chronic, microvascular ischemic change. There is age-related loss of brain parenchymal volume with prominence of sulcation and ventriculomegaly. No significant mass   effect, midline shift, intracranial hemorrhage, or hydrocephalus is identified. No extra-axial fluid collection is identified.         Impression:      1.No acute intracranial abnormality is identified.  2.Findings compatible with chronic microvascular ischemic change and diffuse cortical atrophy.    -------    CT FACE WITHOUT IV CONTRAST    FINDINGS:  There are nondisplaced fractures of the bilateral nasal bones as well as the anterior bony nasal septum. There is soft tissue swelling across the nasal bridge. Bony orbits, globes, retrobulbar soft tissues, and optic nerves appear unremarkable   bilaterally. Paranasal sinuses and mastoid air cells appear well aerated without air-fluid levels identified. The pterygoid plates and zygomatic arches are intact. Superficial soft tissues are without significant abnormality. Limited visualization of the   intracranial contents is unremarkable.    IMPRESSION:  Nondisplaced fractures of the bilateral nasal bones as well as the anterior bony nasal septum.    -------    CT CERVICAL SPINE WITHOUT IV CONTRAST    FINDINGS:  No acute fracture is identified. There is moderate cervical spondylosis, and there is grade 1 anterolisthesis at C3-C4 and C7-T1. Vertebral body heights are maintained. The craniocervical and  atlantoaxial junctions appear within normal limits. The   prevertebral and paraspinal soft tissues are without focal abnormality. Left chest wall pacemaker is partially imaged.    The visualized lung apices are clear.    IMPRESSION:  1.No acute fracture is identified within the cervical spine.  2.Moderate cervical spondylosis with grade 1 anterolisthesis at C3-C4 and C7-T1.          Electronically Signed: Jimmy Collins MD    3/21/2024 5:02 PM EDT    Workstation ID: [id number]    CT Cervical Spine Without Contrast [[id number]] Collected: 03/21/24 1653     Updated: 03/21/24 1704    Narrative:      CT HEAD WO CONTRAST, CT CERVICAL SPINE WO CONTRAST, CT FACIAL BONES WO CONTRAST    Date of Exam: 3/21/2024 4:46 PM EDT    Indication: trauma.    Comparison: Noncontrast head CT dated 7/21/2022    Technique: Axial CT images were obtained of the head without contrast administration.  Coronal reconstructions were performed.  Automated exposure control and iterative reconstruction methods were used.        CT HEAD WITHOUT IV CONTRAST    FINDINGS:    Foci of periventricular and subcortical white matter hypoattenuation are consistent with chronic, microvascular ischemic change. There is age-related loss of brain parenchymal volume with prominence of sulcation and ventriculomegaly. No significant mass   effect, midline shift, intracranial hemorrhage, or hydrocephalus is identified. No extra-axial fluid collection is identified.         Impression:      1.No acute intracranial abnormality is identified.  2.Findings compatible with chronic microvascular ischemic change and diffuse cortical atrophy.    -------    CT FACE WITHOUT IV CONTRAST    FINDINGS:  There are nondisplaced fractures of the bilateral nasal bones as well as the anterior bony nasal septum. There is soft tissue swelling across the nasal bridge. Bony orbits, globes, retrobulbar soft tissues, and optic nerves appear unremarkable   bilaterally. Paranasal sinuses and  mastoid air cells appear well aerated without air-fluid levels identified. The pterygoid plates and zygomatic arches are intact. Superficial soft tissues are without significant abnormality. Limited visualization of the   intracranial contents is unremarkable.    IMPRESSION:  Nondisplaced fractures of the bilateral nasal bones as well as the anterior bony nasal septum.    -------    CT CERVICAL SPINE WITHOUT IV CONTRAST    FINDINGS:  No acute fracture is identified. There is moderate cervical spondylosis, and there is grade 1 anterolisthesis at C3-C4 and C7-T1. Vertebral body heights are maintained. The craniocervical and atlantoaxial junctions appear within normal limits. The   prevertebral and paraspinal soft tissues are without focal abnormality. Left chest wall pacemaker is partially imaged.    The visualized lung apices are clear.    IMPRESSION:  1.No acute fracture is identified within the cervical spine.  2.Moderate cervical spondylosis with grade 1 anterolisthesis at C3-C4 and C7-T1.          Electronically Signed: Jimmy Collins MD    3/21/2024 5:02 PM EDT    Workstation ID: [id number]    CT Facial Bones Without Contrast [[id number]] Collected: 03/21/24 1653     Updated: 03/21/24 1704    Narrative:      CT HEAD WO CONTRAST, CT CERVICAL SPINE WO CONTRAST, CT FACIAL BONES WO CONTRAST    Date of Exam: 3/21/2024 4:46 PM EDT    Indication: trauma.    Comparison: Noncontrast head CT dated 7/21/2022    Technique: Axial CT images were obtained of the head without contrast administration.  Coronal reconstructions were performed.  Automated exposure control and iterative reconstruction methods were used.        CT HEAD WITHOUT IV CONTRAST    FINDINGS:    Foci of periventricular and subcortical white matter hypoattenuation are consistent with chronic, microvascular ischemic change. There is age-related loss of brain parenchymal volume with prominence of sulcation and ventriculomegaly. No significant mass   effect,  midline shift, intracranial hemorrhage, or hydrocephalus is identified. No extra-axial fluid collection is identified.         Impression:      1.No acute intracranial abnormality is identified.  2.Findings compatible with chronic microvascular ischemic change and diffuse cortical atrophy.    -------    CT FACE WITHOUT IV CONTRAST    FINDINGS:  There are nondisplaced fractures of the bilateral nasal bones as well as the anterior bony nasal septum. There is soft tissue swelling across the nasal bridge. Bony orbits, globes, retrobulbar soft tissues, and optic nerves appear unremarkable   bilaterally. Paranasal sinuses and mastoid air cells appear well aerated without air-fluid levels identified. The pterygoid plates and zygomatic arches are intact. Superficial soft tissues are without significant abnormality. Limited visualization of the   intracranial contents is unremarkable.    IMPRESSION:  Nondisplaced fractures of the bilateral nasal bones as well as the anterior bony nasal septum.    -------    CT CERVICAL SPINE WITHOUT IV CONTRAST    FINDINGS:  No acute fracture is identified. There is moderate cervical spondylosis, and there is grade 1 anterolisthesis at C3-C4 and C7-T1. Vertebral body heights are maintained. The craniocervical and atlantoaxial junctions appear within normal limits. The   prevertebral and paraspinal soft tissues are without focal abnormality. Left chest wall pacemaker is partially imaged.    The visualized lung apices are clear.    IMPRESSION:  1.No acute fracture is identified within the cervical spine.  2.Moderate cervical spondylosis with grade 1 anterolisthesis at C3-C4 and C7-T1.          Electronically Signed: Jimmy Collins MD    3/21/2024 5:02 PM EDT    Workstation ID: [id number]            I have reviewed the patient's current medications.     Assessment/Plan     1) A-fib with RVR  - resolved  - off cardizem  - cardiology consulted, awaiting recs  - metoprolol, eliquis  - differ to  cardiology to restart oral cardizem in view of blood presure  - monitor on telemetry    2) fall  - mechanical  - PT/OT consulted  - patient feels like she may need rehab    3) HTN  - home meds    4) HLD  - lipitor    5) CHF?  - last TTE shows preserved EF  - home meds    6) CAD  - home meds  - does not appear to be on ASA as an outpatient    7) GI/DVT ppx  - none/eliquis            Electronically signed by Travis Sandoval MD, 03/22/24, 10:31 EDT.

## 2024-03-22 NOTE — CASE MANAGEMENT/SOCIAL WORK
Discharge Planning Assessment   Gabriel     Patient Name: Catalina Moreno  MRN: [id number]  Today's Date: 3/22/2024    Admit Date: 3/21/2024    Plan: NC Plan: [facility] pending acceptance. No precert required. PASRR approved.  Van for transport at NC.   Discharge Needs Assessment       Row Name 03/22/24 1527       Living Environment    People in Home alone    Current Living Arrangements home    Potentially Unsafe Housing Conditions none    In the past 12 months has the electric, gas, oil, or water company threatened to shut off services in your home? No    Primary Care Provided by self    Provides Primary Care For no one    Family Caregiver if Needed child(evangelista), adult    Family Caregiver Names Son Karri Montejo and Son Jordan Garcia    Quality of Family Relationships helpful;involved;supportive    Able to Return to Prior Arrangements yes       Resource/Environmental Concerns    Resource/Environmental Concerns none    Transportation Concerns rides, unreliable from others       Transportation Needs    In the past 12 months, has lack of transportation kept you from medical appointments or from getting medications? no  Sometimes transport is unreliable but her sons usually help out    In the past 12 months, has lack of transportation kept you from meetings, work, or from getting things needed for daily living? No       Food Insecurity    Within the past 12 months, you worried that your food would run out before you got the money to buy more. Never true    Within the past 12 months, the food you bought just didn't last and you didn't have money to get more. Never true       Transition Planning    Patient/Family Anticipates Transition to inpatient rehabilitation facility    Patient/Family Anticipated Services at Transition skilled nursing    Transportation Anticipated health plan transportation       Discharge Needs Assessment    Readmission Within the Last 30 Days no previous admission in last 30 days    Equipment  Currently Used at Home cane, straight    Concerns to be Addressed discharge planning    Anticipated Changes Related to Illness none    Equipment Needed After Discharge none    Outpatient/Agency/Support Group Needs skilled nursing facility    Discharge Facility/Level of Care Needs nursing facility, skilled    Provided Post Acute Provider List? N/A    Provided Post Acute Provider Quality & Resource List? N/A    Patient's Choice of Community Agency(s) Patient has been to [place] in the past and would be agreeable to return.    Current Discharge Risk physical impairment;lives alone                   Discharge Plan       Row Name 03/22/24 1529       Plan    Plan DC Plan: [facility] pending acceptance. No precert required. PASRR approved. WC Van for transport at DC.    Patient/Family in Agreement with Plan yes    Provided Post Acute Provider List? N/A    Provided Post Acute Provider Quality & Resource List? N/A    Plan Comments CM spoke with patient at bedside to discuss admission assessment and discharge planning. Patient confirms PCP and pharmacy. PCP updated in Epic. Patient denies any difficulty affording medications at this time. Patient feels like she will need rehab before returning home stating she has been struggling with some weakness. Patient states she has been to [place] in the past and would like to return there. PT/OT agree and placed a recommendation for SNF. CM placed referral in basket for [facility] and liaison notified. Awaiting confirmation of acceptance at this time. Patient will need WC van transport when ready for DC.CM reviewed chart documentation to obtain clinical updates. CM will continue to follow for any further needs and adjust discharge plan accordingly. DC Barriers: Cardiac monitoring, O2@2L nc, Cardizem gtt, and pending Cardiology consult.                  Continued Care and Services - Admitted Since 3/21/2024       Destination       Service Provider Request Status Selected  Services Address Phone Fax Patient Preferred    [facility] Pending - Request Sent N/A [address] [facility] [place] [address] [phone] --                  Selected Continued Care - Prior Encounters Includes continued care and service providers with selected services from prior encounters from 12/22/2023 to 3/22/2024      Discharged on 1/14/2024 Admission date: 1/10/2024 - Discharge disposition: Skilled Nursing Facility (DC - External)      Destination       Service Provider Selected Services Address Phone Fax Patient Preferred    [facility] [address] [facility] [place] [address] [phone] --                          Expected Discharge Date and Time       Expected Discharge Date Expected Discharge Time    Mar 24, 2024            Demographic Summary       Row Name 03/22/24 1526       General Information    Admission Type inpatient    Arrived From emergency department;home    Required Notices Provided Important Message from Medicare    Referral Source admission list    Reason for Consult discharge planning    Preferred Language English       Contact Information    Permission Granted to Share Info With                    Functional Status       Row Name 03/22/24 1526       Functional Status    Usual Activity Tolerance good    Current Activity Tolerance moderate       Physical Activity    On average, how many days per week do you engage in moderate to strenuous exercise (like a brisk walk)? 0 days    On average, how many minutes do you engage in exercise at this level? 0 min    Number of minutes of exercise per week 0       Functional Status, IADL    Medications independent    Meal Preparation independent    Housekeeping independent    Laundry independent    Shopping independent    IADL Comments Patient admits to some weakness and more difficulty with independence recently.       Mental Status    General  Appearance WDL X  Bruising to face from recent fall.       Mental Status Summary    Recent Changes in Mental Status/Cognitive Functioning no changes       Employment/    Employment Status seasonal worker    Current or Previous Occupation not applicable           Current or Previous  Service none               Met with patient in room   Maintain distance greater than six feet and spent less than fifteen minutes in the room.    Aidee Pierre RN     Office Phone: [phone]  Office Cell:     [phone]

## 2024-03-22 NOTE — DISCHARGE PLACEMENT REQUEST
"Catalina Carrera (83 y.o. Female)       Date of Birth   1940    Social Security Number       Address   [address] [place] [address] [place] [facility]    Home Phone   [phone]    MRN   [id number]       Rastafari   None    Marital Status                               Admission Date   3/21/24    Admission Type   Emergency    Admitting Provider   Dillon Hutson MD    Attending Provider   Travis Sandoval MD    Department, Room/Bed   [facility] CARDIOVASCULAR CARE UNIT, 2202/1       Discharge Date       Discharge Disposition       Discharge Destination                                 Attending Provider: Travis Sandoval MD    Allergies: Baclofen, Codeine, Ibuprofen, Methocarbamol, Naproxen, Tizanidine Hcl, Tolmetin    Isolation: None   Infection: None   Code Status: CPR    Ht: 160 cm (63\")   Wt: 69.4 kg (153 lb)    Admission Cmt: None   Principal Problem: Atrial fibrillation with RVR [I48.91]                   Active Insurance as of 3/21/2024       Primary Coverage       Payor Plan Insurance Group Employer/Plan Group    MEDICARE MEDICARE A & B        Payor Plan Address Payor Plan Phone Number Payor Plan Fax Number Effective Dates    [address] [phone]  4/1/2006 - None Entered    Formerly [facility] [id number]         Subscriber Name Subscriber Birth Date Member ID       CATALINA CARRERA 1940 [id number]               Secondary Coverage       Payor Plan Insurance Group Employer/Plan Group    ANTHEM BLUE CROSS ANTHEM BLUE CROSS BLUE SHIELD PPO [id number]       Payor Plan Address Payor Plan Phone Number Payor Plan Fax Number Effective Dates    [address] [phone]  2/2/2022 - None Entered    [facility] [id number]         Subscriber Name Subscriber Birth Date Member ID       CATALINA CARRERA 1940 [id number]                     Emergency Contacts        (Rel.) Home Phone Work Phone Mobile Phone    MADHAV DEAN (Son) [phone] -- [phone]    REILLY ESPINOZA (Friend) " -- -- [phone]                 History & Physical        Essing-Melania Collazo APRN at 24       Attestation signed by Enedina Manriquez MD at [phone]    I have reviewed this documentation and agree.                      [facility]  History & Physical    Patient Name: Catalina Moreno  : 1940  MRN: [id number]  Primary Care Physician:  Provider, No Known  Date of admission: 3/21/2024  Date and Time of Service: 3/21/2024 at 1927    Subjective      Chief Complaint: Fall    History of Present Illness: Catalina Moreno is a  very pleasant 83 y.o. female with a CMH of paroxysmal atrial fibrillation on Eliquis, chronic kidney disease, anxiety depression, chronic heart failure last echo dated 2023 ejection fraction 51 to 55%, degenerative disc disease, GERD, hypertension, hyperlipidemia, who presented to [facility] on 3/21/2024 with complaints of left ankle pain and headache after a fall walking on concrete .  She is awake and alert very good historian.  She denies any precipitating factors to the fall she reports she just tripped.  She denies any chest pain, shortness of air dizziness syncope or palpitations either before or after the fall.  She is breathing comfortably on room air.  She denies any current headache.  She reports she is usually quite independent and is very careful walking.    EKG showed atrial fibrillation with RVR, sensitivity troponin 12 chest x-ray showed no acute process per radiology, x-ray of pelvis per radiology negative for acute osseous abnormality, x-ray lumbar spine negative for acute osseous abnormality CT cervical spine per radiology no acute fracture moderate cervical spondylosis C3-C4 C7-T1, CT head per radiology no acute intracranial abnormality chronic microvascular ischemic changes and diffuse cortical atrophy, CT facial bones per radiology did show nondisplaced fractures of the bilateral nasal bones as well as the  anterior bony nasal septum.    She was given a tetanus shot in the emergency department and started on IV Cardizem drip with improvement in rate.  Admitted for further evaluation and treatment cardiology has been consulted.  Nasal bridge laceration was sutured in ED and she was given a one-time dose of IV cefazolin.    Review of Systems   Constitutional:  Positive for fatigue.   HENT: Negative.     Eyes: Negative.    Respiratory: Negative.     Cardiovascular: Negative.    Gastrointestinal: Negative.    Endocrine: Negative.    Genitourinary: Negative.    Musculoskeletal: Negative.    Skin: Negative.    Allergic/Immunologic: Negative.    Neurological: Negative.    Hematological: Negative.    Psychiatric/Behavioral: Negative.     All other systems reviewed and are negative.      Personal History     Past Medical History:   Diagnosis Date    Acquired spondylolisthesis of lumbosacral region     Acute kidney injury 07/22/2022    Anxiety     Arthritis     Atrial fibrillation     paroxysmal    Broken shoulder     left-- due to fall 11-7-19 was at [facility]    Chronic pain disorder     Chronic systolic CHF (congestive heart failure) 01/05/2023    EF 36-40%    Coronary artery disease involving native coronary artery of native heart without angina pectoris 04/02/2021    nonobstructive    DDD (degenerative disc disease), cervical     DDD (degenerative disc disease), lumbar     Depression     Edema     9/2020 foot    GERD (gastroesophageal reflux disease)     H/O fall     Heart failure with mid-range ejection fraction 03/05/2022    EF 45% per 2D echo    Hypertension     Hypothyroidism     Insomnia     Low back pain     Moderate mitral regurgitation 01/05/2023    Neuropathy     Nonrheumatic tricuspid valve regurgitation     Pain in both feet     Polypharmacy     PONV (postoperative nausea and vomiting)     Pulmonary hypertension     Radiculopathy     Restless legs     Sacroiliitis     Sick sinus syndrome     Added automatically from  request for surgery [id number]    Spondylolisthesis     Stage 3a chronic kidney disease     Urinary incontinence     Vitamin D deficiency        Past Surgical History:   Procedure Laterality Date    BACK SURGERY  07/19/2018    PDC &  PSF L3-L5 insitu    CARDIAC CATHETERIZATION N/A 4/2/2021    Procedure: Cardiac Catheterization/Vascular Study;  Surgeon: Barrett Evans MD;  Location: [facility] CATH INVASIVE LOCATION;  Service: Cardiovascular;  Laterality: N/A;    CARDIAC ELECTROPHYSIOLOGY PROCEDURE N/A 1/17/2023    Procedure: Pacemaker DC new;  Surgeon: Baljeet Valero MD;  Location: [facility] CATH INVASIVE LOCATION;  Service: Cardiovascular;  Laterality: N/A;    CHOLECYSTECTOMY      COLONOSCOPY      ENDOSCOPY N/A 9/14/2023    Procedure: ESOPHAGOGASTRODUODENOSCOPY;  Surgeon: Ulysses Lamas MD;  Location: [facility] ENDOSCOPY;  Service: Gastroenterology;  Laterality: N/A;  gastritis, inflammatory gastric polyp    HYSTERECTOMY      ROTATOR CUFF REPAIR Left        Family History: family history includes Cancer in her father, paternal aunt, and paternal uncle; Diabetes in her son; Heart disease in her paternal aunt. Otherwise pertinent FHx was reviewed and not pertinent to current issue.    Social History:  reports that she has never smoked. She has never used smokeless tobacco. She reports that she does not drink alcohol and does not use drugs.    Home Medications:  Prior to Admission Medications       Prescriptions Last Dose Informant Patient Reported? Taking?    apixaban (ELIQUIS) 5 MG tablet tablet   No No    Take 1 tablet by mouth Every 12 (Twelve) Hours. Indications: Atrial Fibrillation    atorvastatin (LIPITOR) 10 MG tablet   Yes No    Take 1 tablet by mouth Daily.    dilTIAZem CD (CARDIZEM CD) 240 MG 24 hr capsule   No No    Take 1 capsule by mouth Daily.    lidocaine (LIDODERM) 5 %   No No    Place 2 patches on the skin as directed by provider Daily. Remove & Discard patch within 12 hours or as  directed by MD    metoprolol succinate XL (TOPROL-XL) 25 MG 24 hr tablet   Yes No    Take 1 tablet by mouth Daily.    rOPINIRole (REQUIP) 0.25 MG tablet   No No    Take 1 tablet by mouth Every Night. Take 1 hour before bedtime.  Indications: Restless Leg Syndrome    sacubitril-valsartan (Entresto) 24-26 MG tablet   Yes No    Take 1 tablet by mouth 2 (Two) Times a Day.    sennosides-docusate (PERICOLACE) 8.6-50 MG per tablet   No No    Take 1 tablet by mouth Daily.              Allergies:  Allergies   Allergen Reactions    Baclofen Rash    Codeine Nausea Only    Ibuprofen Unknown (See Comments)     Patient doesn't know----Motin    Methocarbamol Unknown (See Comments)     Patient doesn't know    Naproxen Unknown (See Comments)     Pt. Doesn't know     Tizanidine Hcl Other (See Comments)     Syncope     Tolmetin Dizziness     Pt. Doesn't know-- same as Tolectin       Objective      Vitals:   Temp:  [98 °F (36.7 °C)] 98 °F (36.7 °C)  Heart Rate:  [] 99  Resp:  [16-19] 17  BP: (115-137)/() 115/59  Body mass index is 27.1 kg/m².  Physical Exam  Vitals reviewed.   Constitutional:       Appearance: Normal appearance. She is obese.   HENT:      Head: Normocephalic and atraumatic.        Comments: Laceration and edema bridge of nose       Right Ear: External ear normal.      Left Ear: External ear normal.      Nose:      Comments: Laceration bridge of nose , edema     Mouth/Throat:      Mouth: Mucous membranes are moist.   Eyes:      Extraocular Movements: Extraocular movements intact.   Cardiovascular:      Rate and Rhythm: Tachycardia present. Rhythm irregular.      Pulses: Normal pulses.      Heart sounds: Normal heart sounds.   Pulmonary:      Effort: Pulmonary effort is normal.      Breath sounds: Normal breath sounds.   Abdominal:      Palpations: Abdomen is soft.   Genitourinary:     Comments: deferred  Musculoskeletal:         General: Normal range of motion.      Cervical back: Normal range of motion and  neck supple.      Comments: Left ankle edema   Skin:     General: Skin is warm and dry.   Neurological:      General: No focal deficit present.      Mental Status: She is alert and oriented to person, place, and time.   Psychiatric:         Mood and Affect: Mood normal.         Behavior: Behavior normal.         Thought Content: Thought content normal.         Judgment: Judgment normal.         Diagnostic Data:  Lab Results (last 24 hours)       Procedure Component Value Units Date/Time    High Sensitivity Troponin T 2Hr [[id number]]  (Normal) Collected: 03/21/24 2000    Specimen: Blood Updated: 03/21/24 2034     HS Troponin T 11 ng/L      Troponin T Delta -1 ng/L     Narrative:      High Sensitive Troponin T Reference Range:  <14.0 ng/L- Negative Female for AMI  <22.0 ng/L- Negative Male for AMI  >=14 - Abnormal Female indicating possible myocardial injury.  >=22 - Abnormal Male indicating possible myocardial injury.   Clinicians would have to utilize clinical acumen, EKG, Troponin, and serial changes to determine if it is an Acute Myocardial Infarction or myocardial injury due to an underlying chronic condition.         Magnesium [[id number]]  (Normal) Collected: 03/21/24 1732    Specimen: Blood Updated: 03/21/24 1914     Magnesium 2.0 mg/dL     Comprehensive Metabolic Panel [[id number]]  (Abnormal) Collected: 03/21/24 1732    Specimen: Blood Updated: 03/21/24 1809     Glucose 109 mg/dL      BUN 18 mg/dL      Creatinine 0.88 mg/dL      Sodium 141 mmol/L      Potassium 4.3 mmol/L      Chloride 102 mmol/L      CO2 24.0 mmol/L      Calcium 9.6 mg/dL      Total Protein 7.6 g/dL      Albumin 4.5 g/dL      ALT (SGPT) 7 U/L      AST (SGOT) 16 U/L      Alkaline Phosphatase 90 U/L      Total Bilirubin 1.2 mg/dL      Globulin 3.1 gm/dL      A/G Ratio 1.5 g/dL      BUN/Creatinine Ratio 20.5     Anion Gap 15.0 mmol/L      eGFR 65.7 mL/min/1.73     Narrative:      GFR Normal >60  Chronic Kidney Disease <60  Kidney Failure  <15    The GFR formula is only valid for adults with stable renal function between ages 18 and 70.    High Sensitivity Troponin T [[id number]]  (Normal) Collected: 03/21/24 1732    Specimen: Blood Updated: 03/21/24 1809     HS Troponin T 12 ng/L     Narrative:      High Sensitive Troponin T Reference Range:  <14.0 ng/L- Negative Female for AMI  <22.0 ng/L- Negative Male for AMI  >=14 - Abnormal Female indicating possible myocardial injury.  >=22 - Abnormal Male indicating possible myocardial injury.   Clinicians would have to utilize clinical acumen, EKG, Troponin, and serial changes to determine if it is an Acute Myocardial Infarction or myocardial injury due to an underlying chronic condition.         aPTT [[id number]]  (Abnormal) Collected: 03/21/24 1732    Specimen: Blood Updated: 03/21/24 1752     PTT 26.8 seconds     Protime-INR [[id number]]  (Normal) Collected: 03/21/24 1732    Specimen: Blood Updated: 03/21/24 1752     Protime 11.3 Seconds      INR 1.04    CBC & Differential [[id number]]  (Abnormal) Collected: 03/21/24 1732    Specimen: Blood Updated: 03/21/24 1738    Narrative:      The following orders were created for panel order CBC & Differential.  Procedure                               Abnormality         Status                     ---------                               -----------         ------                     CBC Auto Differential[[id number]]        Abnormal            Final result                 Please view results for these tests on the individual orders.    CBC Auto Differential [[id number]]  (Abnormal) Collected: 03/21/24 1732    Specimen: Blood Updated: 03/21/24 1738     WBC 10.43 10*3/mm3      RBC 4.36 10*6/mm3      Hemoglobin 11.8 g/dL      Hematocrit 38.2 %      MCV 87.6 fL      MCH 27.1 pg      MCHC 30.9 g/dL      RDW 15.9 %      RDW-SD 51.1 fl      MPV 10.4 fL      Platelets 276 10*3/mm3      Neutrophil % 75.3 %      Lymphocyte % 16.5 %      Monocyte % 6.1 %      Eosinophil % 0.8 %       Basophil % 0.9 %      Immature Grans % 0.4 %      Neutrophils, Absolute 7.86 10*3/mm3      Lymphocytes, Absolute 1.72 10*3/mm3      Monocytes, Absolute 0.64 10*3/mm3      Eosinophils, Absolute 0.08 10*3/mm3      Basophils, Absolute 0.09 10*3/mm3      Immature Grans, Absolute 0.04 10*3/mm3      nRBC 0.0 /100 WBC              Imaging Results (Last 24 Hours)       Procedure Component Value Units Date/Time    XR Spine Lumbar Complete 4+VW [[id number]] Collected: 03/21/24 1921     Updated: 03/21/24 1929    Narrative:      XR PELVIS 1 OR 2 VW, XR SPINE LUMBAR COMPLETE 4+VW    Date of Exam: 3/21/2024 7:16 PM EDT    Indication: trauma    Comparison: None available.    Findings:  Pelvis:  The hips are maintained in alignment. Iliopectineal and ilioischial lines are intact. No fracture. No dislocation.    Lumbar spine:  There are 5 nonrib-bearing lumbar-type vertebral bodies. Vertebral bodies are maintained in height. Negative for acute fracture. Mild convex right dextrocurvature of the lumbar spine centered at L2. Multilevel disc narrowing in the lumbar spine. Mild   anterolisthesis of L3 on L4 and L4 on L5 similar to prior CT. Multilevel disc narrowing throughout the lumbar spine. Facet arthritis in the lower lumbar spine most pronounced at L4-5 and L5-S1.      Impression:      Impression:  Pelvis:  No acute osseous abnormality.    Lumbar spine:  1. Negative for acute osseous abnormality.  2. Lumbar degenerative findings above.      Electronically Signed: Edil Joshua MD    3/21/2024 7:27 PM EDT    Workstation ID: [id number]    XR Pelvis 1 or 2 View [[id number]] Collected: 03/21/24 1921     Updated: 03/21/24 1929    Narrative:      XR PELVIS 1 OR 2 VW, XR SPINE LUMBAR COMPLETE 4+VW    Date of Exam: 3/21/2024 7:16 PM EDT    Indication: trauma    Comparison: None available.    Findings:  Pelvis:  The hips are maintained in alignment. Iliopectineal and ilioischial lines are intact. No fracture. No dislocation.    Lumbar  spine:  There are 5 nonrib-bearing lumbar-type vertebral bodies. Vertebral bodies are maintained in height. Negative for acute fracture. Mild convex right dextrocurvature of the lumbar spine centered at L2. Multilevel disc narrowing in the lumbar spine. Mild   anterolisthesis of L3 on L4 and L4 on L5 similar to prior CT. Multilevel disc narrowing throughout the lumbar spine. Facet arthritis in the lower lumbar spine most pronounced at L4-5 and L5-S1.      Impression:      Impression:  Pelvis:  No acute osseous abnormality.    Lumbar spine:  1. Negative for acute osseous abnormality.  2. Lumbar degenerative findings above.      Electronically Signed: Edil Joshua MD    3/21/2024 7:27 PM EDT    Workstation ID: [id number]    XR Chest 1 View [[id number]] Collected: 03/21/24 1919     Updated: 03/21/24 1922    Narrative:      XR CHEST 1 VW    Date of Exam: 3/21/2024 7:15 PM EDT    Indication: tachcardia    Comparison: 9/18/2023    Findings:  Left-sided AICD noted. Stable mild cardiomegaly. Lungs are without consolidation. Patient rotated to the right. Negative for pneumothorax. No pleural effusion. Osseous structures appear grossly intact.      Impression:      Impression:  No acute process.      Electronically Signed: Edil Joshua MD    3/21/2024 7:20 PM EDT    Workstation ID: [id number]    XR Ankle 3+ View Left [[id number]] Collected: 03/21/24 1733     Updated: 03/21/24 1736    Narrative:      XR ANKLE 3+ VW LEFT, XR FOOT 3+ VW LEFT    Date of Exam: 3/21/2024 5:09 PM EDT    Indication: trauma    Comparison: None available.    Findings:  No evidence of acute fracture nor dislocation. Alignment is normal. No degenerative arthrosis throughout. Soft tissues are unremarkable.      Impression:      Impression:  No acute osseous injury identified.      Electronically Signed: Kirill Catalan MD    3/21/2024 5:34 PM EDT    Workstation ID: [id number]    XR Foot 3+ View Left [[id number]] Collected: 03/21/24 1733     Updated: 03/21/24 1736     Narrative:      XR ANKLE 3+ VW LEFT, XR FOOT 3+ VW LEFT    Date of Exam: 3/21/2024 5:09 PM EDT    Indication: trauma    Comparison: None available.    Findings:  No evidence of acute fracture nor dislocation. Alignment is normal. No degenerative arthrosis throughout. Soft tissues are unremarkable.      Impression:      Impression:  No acute osseous injury identified.      Electronically Signed: Kirill Catalan MD    3/21/2024 5:34 PM EDT    Workstation ID: [id number]    CT Head Without Contrast [[id number]] Collected: 03/21/24 1653     Updated: 03/21/24 1704    Narrative:      CT HEAD WO CONTRAST, CT CERVICAL SPINE WO CONTRAST, CT FACIAL BONES WO CONTRAST    Date of Exam: 3/21/2024 4:46 PM EDT    Indication: trauma.    Comparison: Noncontrast head CT dated 7/21/2022    Technique: Axial CT images were obtained of the head without contrast administration.  Coronal reconstructions were performed.  Automated exposure control and iterative reconstruction methods were used.        CT HEAD WITHOUT IV CONTRAST    FINDINGS:    Foci of periventricular and subcortical white matter hypoattenuation are consistent with chronic, microvascular ischemic change. There is age-related loss of brain parenchymal volume with prominence of sulcation and ventriculomegaly. No significant mass   effect, midline shift, intracranial hemorrhage, or hydrocephalus is identified. No extra-axial fluid collection is identified.         Impression:      1.No acute intracranial abnormality is identified.  2.Findings compatible with chronic microvascular ischemic change and diffuse cortical atrophy.    -------    CT FACE WITHOUT IV CONTRAST    FINDINGS:  There are nondisplaced fractures of the bilateral nasal bones as well as the anterior bony nasal septum. There is soft tissue swelling across the nasal bridge. Bony orbits, globes, retrobulbar soft tissues, and optic nerves appear unremarkable   bilaterally. Paranasal sinuses and mastoid air cells appear  well aerated without air-fluid levels identified. The pterygoid plates and zygomatic arches are intact. Superficial soft tissues are without significant abnormality. Limited visualization of the   intracranial contents is unremarkable.    IMPRESSION:  Nondisplaced fractures of the bilateral nasal bones as well as the anterior bony nasal septum.    -------    CT CERVICAL SPINE WITHOUT IV CONTRAST    FINDINGS:  No acute fracture is identified. There is moderate cervical spondylosis, and there is grade 1 anterolisthesis at C3-C4 and C7-T1. Vertebral body heights are maintained. The craniocervical and atlantoaxial junctions appear within normal limits. The   prevertebral and paraspinal soft tissues are without focal abnormality. Left chest wall pacemaker is partially imaged.    The visualized lung apices are clear.    IMPRESSION:  1.No acute fracture is identified within the cervical spine.  2.Moderate cervical spondylosis with grade 1 anterolisthesis at C3-C4 and C7-T1.          Electronically Signed: Jimmy Collins MD    3/21/2024 5:02 PM EDT    Workstation ID: [id number]    CT Cervical Spine Without Contrast [[id number]] Collected: 03/21/24 1653     Updated: 03/21/24 1704    Narrative:      CT HEAD WO CONTRAST, CT CERVICAL SPINE WO CONTRAST, CT FACIAL BONES WO CONTRAST    Date of Exam: 3/21/2024 4:46 PM EDT    Indication: trauma.    Comparison: Noncontrast head CT dated 7/21/2022    Technique: Axial CT images were obtained of the head without contrast administration.  Coronal reconstructions were performed.  Automated exposure control and iterative reconstruction methods were used.        CT HEAD WITHOUT IV CONTRAST    FINDINGS:    Foci of periventricular and subcortical white matter hypoattenuation are consistent with chronic, microvascular ischemic change. There is age-related loss of brain parenchymal volume with prominence of sulcation and ventriculomegaly. No significant mass   effect, midline shift, intracranial  hemorrhage, or hydrocephalus is identified. No extra-axial fluid collection is identified.         Impression:      1.No acute intracranial abnormality is identified.  2.Findings compatible with chronic microvascular ischemic change and diffuse cortical atrophy.    -------    CT FACE WITHOUT IV CONTRAST    FINDINGS:  There are nondisplaced fractures of the bilateral nasal bones as well as the anterior bony nasal septum. There is soft tissue swelling across the nasal bridge. Bony orbits, globes, retrobulbar soft tissues, and optic nerves appear unremarkable   bilaterally. Paranasal sinuses and mastoid air cells appear well aerated without air-fluid levels identified. The pterygoid plates and zygomatic arches are intact. Superficial soft tissues are without significant abnormality. Limited visualization of the   intracranial contents is unremarkable.    IMPRESSION:  Nondisplaced fractures of the bilateral nasal bones as well as the anterior bony nasal septum.    -------    CT CERVICAL SPINE WITHOUT IV CONTRAST    FINDINGS:  No acute fracture is identified. There is moderate cervical spondylosis, and there is grade 1 anterolisthesis at C3-C4 and C7-T1. Vertebral body heights are maintained. The craniocervical and atlantoaxial junctions appear within normal limits. The   prevertebral and paraspinal soft tissues are without focal abnormality. Left chest wall pacemaker is partially imaged.    The visualized lung apices are clear.    IMPRESSION:  1.No acute fracture is identified within the cervical spine.  2.Moderate cervical spondylosis with grade 1 anterolisthesis at C3-C4 and C7-T1.          Electronically Signed: Jimmy Collins MD    3/21/2024 5:02 PM EDT    Workstation ID: [id number]    CT Facial Bones Without Contrast [[id number]] Collected: 03/21/24 1653     Updated: 03/21/24 1704    Narrative:      CT HEAD WO CONTRAST, CT CERVICAL SPINE WO CONTRAST, CT FACIAL BONES WO CONTRAST    Date of Exam: 3/21/2024 4:46 PM  EDT    Indication: trauma.    Comparison: Noncontrast head CT dated 7/21/2022    Technique: Axial CT images were obtained of the head without contrast administration.  Coronal reconstructions were performed.  Automated exposure control and iterative reconstruction methods were used.        CT HEAD WITHOUT IV CONTRAST    FINDINGS:    Foci of periventricular and subcortical white matter hypoattenuation are consistent with chronic, microvascular ischemic change. There is age-related loss of brain parenchymal volume with prominence of sulcation and ventriculomegaly. No significant mass   effect, midline shift, intracranial hemorrhage, or hydrocephalus is identified. No extra-axial fluid collection is identified.         Impression:      1.No acute intracranial abnormality is identified.  2.Findings compatible with chronic microvascular ischemic change and diffuse cortical atrophy.    -------    CT FACE WITHOUT IV CONTRAST    FINDINGS:  There are nondisplaced fractures of the bilateral nasal bones as well as the anterior bony nasal septum. There is soft tissue swelling across the nasal bridge. Bony orbits, globes, retrobulbar soft tissues, and optic nerves appear unremarkable   bilaterally. Paranasal sinuses and mastoid air cells appear well aerated without air-fluid levels identified. The pterygoid plates and zygomatic arches are intact. Superficial soft tissues are without significant abnormality. Limited visualization of the   intracranial contents is unremarkable.    IMPRESSION:  Nondisplaced fractures of the bilateral nasal bones as well as the anterior bony nasal septum.    -------    CT CERVICAL SPINE WITHOUT IV CONTRAST    FINDINGS:  No acute fracture is identified. There is moderate cervical spondylosis, and there is grade 1 anterolisthesis at C3-C4 and C7-T1. Vertebral body heights are maintained. The craniocervical and atlantoaxial junctions appear within normal limits. The   prevertebral and paraspinal soft  tissues are without focal abnormality. Left chest wall pacemaker is partially imaged.    The visualized lung apices are clear.    IMPRESSION:  1.No acute fracture is identified within the cervical spine.  2.Moderate cervical spondylosis with grade 1 anterolisthesis at C3-C4 and C7-T1.          Electronically Signed: Jimmy Collins MD    3/21/2024 5:02 PM EDT    Workstation ID: [id number]              Assessment & Plan        This is a 83 y.o. female with:    Active and Resolved Problems  Active Hospital Problems    Diagnosis  POA    **Atrial fibrillation with RVR [I48.91]  Yes     Priority: High    Fall [W19.XXXA]  Yes     Priority: High    Nasal bones, closed fracture [S02.2XXA]  Yes     Priority: Medium    Presence of cardiac pacemaker [Z95.0]  Yes    Restless legs syndrome [G25.81]  Yes    Generalized anxiety disorder [F41.1]  Yes    Essential (primary) hypertension [I10]  Yes    Coronary artery disease involving native coronary artery of native heart without angina pectoris [I25.10]  Yes    Stage 3a chronic kidney disease [N18.31]  Yes      Resolved Hospital Problems   No resolved problems to display.     Chronic atrial fibrillation now with RVR, improving on Cardizem drip, on home apixaban, and metoprolol hold home p.o. diltiazem for now, continuous cardiac monitoring cardiology consulted, TSH in a.m.    Fall, mechanical accidental, fall precautions PT eval and treat    Nasal bones and septum fracture, breathing normally on room air, nasal bridge laceration sutured in ED, one-time dose IV cefazolin in ED, not continued, consider ordering p.o. Keflex upon discharge if indicated, [place] 7.5 325 every 4 hours as needed p.o.    Presence of a cardiac pacemaker, on home Entresto    Restless leg syndrome, on home Inderal    Essential primary hypertension, continue home metoprolol hold diltiazem while on diltiazem drip, on Entresto monitor BP    Coronary artery disease, denies chest pain, continuous cardiac monitoring on  statin, troponin not elevated     History of chronic kidney disease stable avoid nephrotoxic meds trend renal function      DVT prophylaxis:  Medical DVT prophylaxis orders are present.        The patient desires to be as follows:    CODE STATUS:    Code Status (Patient has no pulse and is not breathing): CPR (Attempt to Resuscitate)  Medical Interventions (Patient has pulse or is breathing): Full Support        Admission Status:  I believe this patient meets observation status.    Expected Length of Stay: pending cardiac evaluation    PDMP and Medication Dispenses via Sidebar reviewed and consistent with patient reported medications.    I discussed the patient's findings and my recommendations with patient.      Signature:     This document has been electronically signed by KALYANI Guerrero on March 21, 2024 21:26 EDT   [facility] Hospitalist Team    Electronically signed by Enedina Manriquez MD at 03/22/24 0521

## 2024-03-22 NOTE — THERAPY EVALUATION
Patient Name: Catalina Moreno  : 1940    MRN: [id number]                              Today's Date: 3/22/2024       Admit Date: 3/21/2024    Visit Dx:     ICD-10-CM ICD-9-CM   1. Injury of head, initial encounter  S09.90XA 959.01   2. Open fracture of nasal bone, initial encounter  S02.2XXB 802.1   3. Atrial fibrillation with RVR  I48.91 427.31   4. Sprain of left ankle, unspecified ligament, initial encounter  S93.402A 845.00     Patient Active Problem List   Diagnosis    Arthritis    Depressive disorder    Primary fibromyalgia syndrome    Hypothyroidism    Atrial fibrillation with RVR    Stage 3a chronic kidney disease    Chronic low back pain    Age-related cognitive decline    Generalized anxiety disorder    Polyneuropathy, unspecified    Restless legs syndrome    Paroxysmal atrial fibrillation    Essential (primary) hypertension    Gastro-esophageal reflux disease without esophagitis    Pulmonary hypertension, unspecified    Coronary artery disease involving native coronary artery of native heart without angina pectoris    Presence of cardiac pacemaker    Chronic HFrEF (heart failure with reduced ejection fraction)    Atrial flutter with rapid ventricular response    Weakness    AF (atrial fibrillation)    Chronic heart failure with preserved ejection fraction (HFpEF)    Fall    Nasal bones, closed fracture     Past Medical History:   Diagnosis Date    Acquired spondylolisthesis of lumbosacral region     Acute kidney injury 2022    Anxiety     Arthritis     Atrial fibrillation     paroxysmal    Broken shoulder     left-- due to fall 20 was at [facility]    Chronic pain disorder     Chronic systolic CHF (congestive heart failure) 2023    EF 36-40%    Coronary artery disease involving native coronary artery of native heart without angina pectoris 2021    nonobstructive    DDD (degenerative disc disease), cervical     DDD (degenerative disc disease), lumbar     Depression     Edema      9/2020 foot    GERD (gastroesophageal reflux disease)     H/O fall     Heart failure with mid-range ejection fraction 03/05/2022    EF 45% per 2D echo    Hypertension     Hypothyroidism     Insomnia     Low back pain     Moderate mitral regurgitation 01/05/2023    Neuropathy     Nonrheumatic tricuspid valve regurgitation     Pain in both feet     Polypharmacy     PONV (postoperative nausea and vomiting)     Pulmonary hypertension     Radiculopathy     Restless legs     Sacroiliitis     Sick sinus syndrome     Added automatically from request for surgery [id number]    Spondylolisthesis     Stage 3a chronic kidney disease     Urinary incontinence     Vitamin D deficiency      Past Surgical History:   Procedure Laterality Date    BACK SURGERY  07/19/2018    PDC &  PSF L3-L5 insitu    CARDIAC CATHETERIZATION N/A 4/2/2021    Procedure: Cardiac Catheterization/Vascular Study;  Surgeon: Barrett Evans MD;  Location: [facility] CATH INVASIVE LOCATION;  Service: Cardiovascular;  Laterality: N/A;    CARDIAC ELECTROPHYSIOLOGY PROCEDURE N/A 1/17/2023    Procedure: Pacemaker DC new;  Surgeon: Baljeet Valero MD;  Location: [facility] CATH INVASIVE LOCATION;  Service: Cardiovascular;  Laterality: N/A;    CHOLECYSTECTOMY      COLONOSCOPY      ENDOSCOPY N/A 9/14/2023    Procedure: ESOPHAGOGASTRODUODENOSCOPY;  Surgeon: Ulysses Lamas MD;  Location: [facility] ENDOSCOPY;  Service: Gastroenterology;  Laterality: N/A;  gastritis, inflammatory gastric polyp    HYSTERECTOMY      ROTATOR CUFF REPAIR Left       General Information       Row Name 03/22/24 1127          Physical Therapy Time and Intention    Document Type evaluation  -BR (r) BH (t) BR (c)     Mode of Treatment physical therapy  -BR (r) BH (t) BR (c)       Row Name 03/22/24 1123          General Information    Patient Profile Reviewed yes  -BR (r) BH (t) BR (c)     Prior Level of Function independent:;all household mobility;community mobility;ADL's;driving  -BR (r)  BH (t) BR (c)     Existing Precautions/Restrictions oxygen therapy device and L/min  2L O2, none at baseline  -BR (r) BH (t) BR (c)     Barriers to Rehab none identified  -BR (r) BH (t) BR (c)       Row Name 03/22/24 1127          Living Environment    People in Home alone  -BR (r) BH (t) BR (c)       Row Name 03/22/24 1127          Home Main Entrance    Number of Stairs, Main Entrance none  -BR (r) BH (t) BR (c)       Row Name 03/22/24 1127          Stairs Within Home, Primary    Number of Stairs, Within Home, Primary none  -BR (r) BH (t) BR (c)       Row Name 03/22/24 1127          Cognition    Orientation Status (Cognition) oriented x 4  -BR (r) BH (t) BR (c)       Row Name 03/22/24 1127          Safety Issues, Functional Mobility    Impairments Affecting Function (Mobility) balance;endurance/activity tolerance;strength;pain;shortness of breath;postural/trunk control  -BR (r) BH (t) BR (c)               User Key  (r) = Recorded By, (t) = Taken By, (c) = Cosigned By      Initials Name Provider Type    BR Catalina Adams, PT Physical Therapist     Delmi Belcher, PT Student PT Student                   Mobility       Row Name 03/22/24 1128          Bed Mobility    Bed Mobility bed mobility (all) activities  -BR (r) BH (t) BR (c)     All Activities, [place] (Bed Mobility) standby assist  -BR (r) BH (t) BR (c)     Assistive Device (Bed Mobility) bed rails;head of bed elevated  -BR (r) BH (t) BR (c)       Row Name 03/22/24 1128          Bed-Chair Transfer    Bed-Chair [place] (Transfers) contact guard  -BR (r) BH (t) BR (c)     Assistive Device (Bed-Chair Transfers) walker, front-wheeled  -BR (r) BH (t) BR (c)       Row Name 03/22/24 1128          Sit-Stand Transfer    Sit-Stand [place] (Transfers) contact guard  -BR (r) BH (t) BR (c)     Assistive Device (Sit-Stand Transfers) walker, front-wheeled  -BR (r) BH (t) BR (c)       Row Name 03/22/24 1128          Gait/Stairs (Locomotion)     Gait/Stairs Locomotion --  -BR (r) BH (t) BR (c)     [place] Level (Gait) --  -BR (r) BH (t) BR (c)     Patient was able to Ambulate no, other medical factors prevent ambulation  -BR (r) BH (t) BR (c)     Reason Patient was unable to Ambulate --  Tachycardic  sitting  -BR (r) BH (t) BR (c)     Distance in Feet (Gait) --  -BR (r) BH (t) BR (c)     Deviations/Abnormal Patterns (Gait) --  -BR (r) BH (t) BR (c)     Bilateral Gait Deviations --  -BR (r) BH (t) BR (c)               User Key  (r) = Recorded By, (t) = Taken By, (c) = Cosigned By      Initials Name Provider Type    Catalina Chaves, PT Physical Therapist     Delmi Belcher PT Student PT Student                   Obj/Interventions       Row Name 03/22/24 1130          Range of Motion Comprehensive    General Range of Motion no range of motion deficits identified  -BR (r) BH (t) BR (c)       Row Name 03/22/24 1130          Strength Comprehensive (MMT)    General Manual Muscle Testing (MMT) Assessment lower extremity strength deficits identified  -BR (r) BH (t) BR (c)     Comment, General Manual Muscle Testing (MMT) Assessment BLE grossly 3+/5  -BR (r) BH (t) BR (c)       Row Name 03/22/24 1130          Balance    Balance Assessment sitting static balance;sitting dynamic balance;sit to stand dynamic balance;standing static balance  -BR (r) BH (t) BR (c)     Static Sitting Balance standby assist  -BR (r) BH (t) BR (c)     Dynamic Sitting Balance standby assist  -BR (r) BH (t) BR (c)     Position, Sitting Balance unsupported;sitting edge of bed  -BR (r) BH (t) BR (c)     Sit to Stand Dynamic Balance contact guard  -BR (r) BH (t) BR (c)     Static Standing Balance contact guard  -BR (r) BH (t) BR (c)     Position/Device Used, Standing Balance walker, front-wheeled  -BR (r) BH (t) BR (c)       Row Name 03/22/24 1130          Sensory Assessment (Somatosensory)    Sensory Assessment (Somatosensory) sensation intact  -BR (r) BH (t) BR (c)                User Key  (r) = Recorded By, (t) = Taken By, (c) = Cosigned By      Initials Name Provider Type    Catalina Chaves, PT Physical Therapist    Delmi Muhammad, PT Student PT Student                   Goals/Plan       Row Name 03/22/24 1211          Bed Mobility Goal 1 (PT)    Activity/Assistive Device (Bed Mobility Goal 1, PT) bed mobility activities, all  -BR (r) BH (t) BR (c)     [place] Level/Cues Needed (Bed Mobility Goal 1, PT) independent  -BR (r) BH (t) BR (c)     Time Frame (Bed Mobility Goal 1, PT) long term goal (LTG);2 weeks  -BR (r) BH (t) BR (c)       Row Name 03/22/24 1211          Transfer Goal 1 (PT)    Activity/Assistive Device (Transfer Goal 1, PT) transfers, all  -BR (r) BH (t) BR (c)     [place] Level/Cues Needed (Transfer Goal 1, PT) independent  -BR (r) BH (t) BR (c)     Time Frame (Transfer Goal 1, PT) long term goal (LTG);2 weeks  -BR (r) BH (t) BR (c)       Row Name 03/22/24 1211          Gait Training Goal 1 (PT)    Activity/Assistive Device (Gait Training Goal 1, PT) gait (walking locomotion);walker, rolling  -BR (r) BH (t) BR (c)     [place] Level (Gait Training Goal 1, PT) modified independence  -BR (r) BH (t) BR (c)     Distance (Gait Training Goal 1, PT) 30 ft  -BR (r) BH (t) BR (c)     Time Frame (Gait Training Goal 1, PT) long term goal (LTG);2 weeks  -BR (r) BH (t) BR (c)       Row Name 03/22/24 1211          Therapy Assessment/Plan (PT)    Planned Therapy Interventions (PT) bed mobility training;gait training;postural re-education;strengthening;transfer training  -BR (r) BH (t) BR (c)               User Key  (r) = Recorded By, (t) = Taken By, (c) = Cosigned By      Initials Name Provider Type    Catalina Chaves, PT Physical Therapist    Delmi Muhammad, PT Student PT Student                   Clinical Impression       Row Name 03/22/24 1130          Pain    Pretreatment Pain Rating 9/10  -BR (r) BH (t) BR (c)     Posttreatment Pain Rating 9/10   -BR (r) BH (t) BR (c)     Pain Location - head  -BR (r) BH (t) BR (c)       Row Name 03/22/24 1130          Plan of Care Review    Plan of Care Reviewed With patient  -BR (r) BH (t) BR (c)     Outcome Evaluation Pt is a 83 y.o. female with a PMH of paroxysmal atrial fibrillation on Eliquis, chronic kidney disease, anxiety depression, chronic heart failure last echo dated 9/7/2023 ejection fraction 51 to 55%, degenerative disc disease, GERD, hypertension, hyperlipidemia, who presented to [facility] on 3/21/2024 with complaints of left ankle pain and headache after a fall walking on concrete. She is awake and alert very good historian. She denies any precipitating factors to the fall she reports she just tripped. CT Head Cervical and Facial all negative for fx. XR L foot and ankle negative for fx. XR chest, pelvis and spine all negative. PLOF is indep with all ADLs, mobility with 4-pt cane and rollator, and driving. Pt lives alone in a SSH with no VLADIMIR. Pt denies any previous falls to current admit. Pt currently 9/10 pain with headache. Pt tachycardic with HR increasing to 147 in sitting, not medically appropriate to ambulate. Pt is SBA with bed mobility and CGA with transfer to chair using RW. Pt has decreased functional mobility endurance and states she has felt weak since fall, BLE grossly 3+/5. Due to pt presentation, recommending SNF at d/c, pt amenable. PT will follow.  -BR (r) BH (t) BR (c)       Row Name 03/22/24 113          Therapy Assessment/Plan (PT)    Rehab Potential (PT) good, to achieve stated therapy goals  -BR (r) BH (t) BR (c)     Criteria for Skilled Interventions Met (PT) yes;meets criteria;skilled treatment is necessary  -BR (r) BH (t) BR (c)     Therapy Frequency (PT) 5 times/wk  -BR (r) BH (t) BR (c)       Row Name 03/22/24 1138          Vital Signs    Pre Systolic BP Rehab 86  -BR (r) BH (t) BR (c)     Pre Treatment Diastolic BP 52  -BR (r) BH (t) BR (c)     Intra Systolic BP Rehab 102  -BR (r) BH  (t) BR (c)     Intra Treatment Diastolic BP 52  -BR (r) BH (t) BR (c)     Post Systolic BP Rehab 111  -BR (r) BH (t) BR (c)     Post Treatment Diastolic BP 65  -BR (r) BH (t) BR (c)     Pretreatment Heart Rate (beats/min) 92  -BR (r) BH (t) BR (c)     Intratreatment Heart Rate (beats/min) 147  -BR (r) BH (t) BR (c)     Posttreatment Heart Rate (beats/min) 135  -BR (r) BH (t) BR (c)     Pretreatment Resp Rate (breaths/min) 13  -BR (r) BH (t) BR (c)     Intratreatment Resp Rate (breaths/min) 16  -BR (r) BH (t) BR (c)     Posttreatment Resp Rate (breaths/min) 18  -BR (r) BH (t) BR (c)     Pre Patient Position Supine  -BR (r) BH (t) BR (c)     Intra Patient Position Sitting  -BR (r) BH (t) BR (c)     Post Patient Position Sitting  -BR (r) BH (t) BR (c)       Row Name 03/22/24 1130          Positioning and Restraints    Pre-Treatment Position in bed  -BR (r) BH (t) BR (c)     Post Treatment Position chair  -BR (r) BH (t) BR (c)     In Chair notified nsg;reclined;call light within reach;encouraged to call for assist;exit alarm on  -BR (r) BH (t) BR (c)               User Key  (r) = Recorded By, (t) = Taken By, (c) = Cosigned By      Initials Name Provider Type    BR Catalina Adams, PT Physical Therapist     Delmi Belcher, PT Student PT Student                   Outcome Measures       Row Name 03/22/24 1144          Modified [place] Scale    Pre-Stroke Modified [place] Scale 0 - No Symptoms at all.  -BR (r) BH (t) BR (c)     Modified Derek Scale 4 - Moderately severe disability.  Unable to walk without assistance, and unable to attend to own bodily needs without assistance.  -BR (r) BH (t) BR (c)       Row Name 03/22/24 1144          Functional Assessment    Outcome Measure Options Modified Derek  -BR (r) BH (t) BR (c)               User Key  (r) = Recorded By, (t) = Taken By, (c) = Cosigned By      Initials Name Provider Type    Catalina Chaves, PT Physical Therapist    Delmi Muhammad, PT Student PT  Student                                 Physical Therapy Education       Title: PT OT SLP Therapies (Done)       Topic: Physical Therapy (Done)       Point: Mobility training (Done)       Learning Progress Summary             Patient Acceptance, E, VU by  at 3/22/2024 1145                         Point: Body mechanics (Done)       Learning Progress Summary             Patient Acceptance, E, VU by  at 3/22/2024 1145                         Point: Precautions (Done)       Learning Progress Summary             Patient Acceptance, E, VU by  at 3/22/2024 1145                                         User Key       Initials Effective Dates Name Provider Type Discipline     01/15/24 -  Delmi Belcher, DEBBI Student PT Student PT                  PT Recommendation and Plan  Planned Therapy Interventions (PT): bed mobility training, gait training, postural re-education, strengthening, transfer training  Plan of Care Reviewed With: patient  Outcome Evaluation: Pt is a 83 y.o. female with a PMH of paroxysmal atrial fibrillation on Eliquis, chronic kidney disease, anxiety depression, chronic heart failure last echo dated 9/7/2023 ejection fraction 51 to 55%, degenerative disc disease, GERD, hypertension, hyperlipidemia, who presented to [facility] on 3/21/2024 with complaints of left ankle pain and headache after a fall walking on concrete. She is awake and alert very good historian. She denies any precipitating factors to the fall she reports she just tripped. CT Head Cervical and Facial all negative for fx. XR L foot and ankle negative for fx. XR chest, pelvis and spine all negative. PLOF is indep with all ADLs, mobility with 4-pt cane and rollator, and driving. Pt lives alone in a SSH with no VLADIMIR. Pt denies any previous falls to current admit. Pt currently 9/10 pain with headache. Pt tachycardic with HR increasing to 147 in sitting, not medically appropriate to ambulate. Pt is SBA with bed mobility and CGA with transfer to  chair using RW. Pt has decreased functional mobility endurance and states she has felt weak since fall, BLE grossly 3+/5. Due to pt presentation, recommending SNF at d/c, pt amenable. PT will follow.     Time Calculation:   PT Evaluation Complexity  History, PT Evaluation Complexity: 3 or more personal factors and/or comorbidities  Examination of Body Systems (PT Eval Complexity): total of 3 or more elements  Clinical Presentation (PT Evaluation Complexity): evolving  Clinical Decision Making (PT Evaluation Complexity): moderate complexity  Overall Complexity (PT Evaluation Complexity): moderate complexity     PT Charges       Row Name 03/22/24 1145             Time Calculation    Start Time 0917  -BR (r) BH (t) BR (c)      Stop Time 0933  -BR (r) BH (t) BR (c)      Time Calculation (min) 16 min  -BR (r) BH (t)      PT Received On 03/22/24  -BR (r) BH (t) BR (c)      PT - Next Appointment 03/25/24  -BR (r) BH (t) BR (c)      PT Goal Re-Cert Due Date 04/05/24  -BR (r) BH (t) BR (c)         Time Calculation- PT    Total Timed Code Minutes- PT 0 minute(s)  -BR (r) BH (t) BR (c)                User Key  (r) = Recorded By, (t) = Taken By, (c) = Cosigned By      Initials Name Provider Type    Catalina Chaves, PT Physical Therapist    Delmi Muhammad, PT Student PT Student                  Therapy Charges for Today       Code Description Service Date Service Provider Modifiers Qty    86782367961 HC PT EVAL MOD COMPLEXITY 4 3/22/2024 Delmi Belcher, PT Student GP 1            PT G-Codes  Outcome Measure Options: Modified [place]  AM-PAC 6 Clicks Score (PT): 22  Modified Derek Scale: 4 - Moderately severe disability.  Unable to walk without assistance, and unable to attend to own bodily needs without assistance.  PT Discharge Summary  Anticipated Discharge Disposition (PT): skilled nursing facility    Delmi Belcher PT Student  3/22/2024

## 2024-03-22 NOTE — PLAN OF CARE
Goal Outcome Evaluation:  Plan of Care Reviewed With: patient           Outcome Evaluation: Pt is a 83 y.o. female with a PMH of paroxysmal atrial fibrillation on Eliquis, chronic kidney disease, anxiety depression, chronic heart failure last echo dated 9/7/2023 ejection fraction 51 to 55%, degenerative disc disease, GERD, hypertension, hyperlipidemia, who presented to [facility] on 3/21/2024 with complaints of left ankle pain and headache after a fall walking on concrete. She is awake and alert very good historian. She denies any precipitating factors to the fall she reports she just tripped. CT Head Cervical and Facial all negative for fx. XR L foot and ankle negative for fx. XR chest, pelvis and spine all negative. PLOF is indep with all ADLs, mobility with 4-pt cane and rollator, and driving. Pt lives alone in a SSH with no VLADIMIR. Pt denies any previous falls to current admit. Pt currently 9/10 pain with headache. Pt tachycardic with HR increasing to 147 in sitting, not medically appropriate to ambulate. Pt is SBA with bed mobility and CGA with transfer to chair using RW. Pt has decreased functional mobility endurance and states she has felt weak since fall, BLE grossly 3+/5. Due to pt presentation, recommending SNF at d/c, pt amenable. PT will follow.      Anticipated Discharge Disposition (PT): skilled nursing facility

## 2024-03-23 PROCEDURE — 93005 ELECTROCARDIOGRAM TRACING: CPT | Performed by: INTERNAL MEDICINE

## 2024-03-23 PROCEDURE — 25010000002 AMIODARONE IN DEXTROSE 5% 360-4.14 MG/200ML-% SOLUTION: Performed by: INTERNAL MEDICINE

## 2024-03-23 PROCEDURE — 93010 ELECTROCARDIOGRAM REPORT: CPT | Performed by: INTERNAL MEDICINE

## 2024-03-23 PROCEDURE — 99233 SBSQ HOSP IP/OBS HIGH 50: CPT | Performed by: INTERNAL MEDICINE

## 2024-03-23 PROCEDURE — 25010000002 AMIODARONE IN DEXTROSE 5% 150-4.21 MG/100ML-% SOLUTION: Performed by: INTERNAL MEDICINE

## 2024-03-23 RX ORDER — DILTIAZEM HYDROCHLORIDE 120 MG/1
240 CAPSULE, COATED, EXTENDED RELEASE ORAL
Status: DISCONTINUED | OUTPATIENT
Start: 2024-03-23 | End: 2024-03-23

## 2024-03-23 RX ADMIN — HYDROCODONE BITARTRATE AND ACETAMINOPHEN 1 TABLET: 7.5; 325 TABLET ORAL at 14:08

## 2024-03-23 RX ADMIN — LIDOCAINE 2 PATCH: 4 PATCH TOPICAL at 08:44

## 2024-03-23 RX ADMIN — AMIODARONE HYDROCHLORIDE 150 MG: 1.5 INJECTION, SOLUTION INTRAVENOUS at 10:56

## 2024-03-23 RX ADMIN — ROPINIROLE HYDROCHLORIDE 0.25 MG: 0.25 TABLET, FILM COATED ORAL at 21:00

## 2024-03-23 RX ADMIN — AMIODARONE HYDROCHLORIDE 1 MG/MIN: 1.8 INJECTION, SOLUTION INTRAVENOUS at 11:11

## 2024-03-23 RX ADMIN — DOCUSATE SODIUM AND SENNOSIDES 1 TABLET: 8.6; 5 TABLET, FILM COATED ORAL at 08:44

## 2024-03-23 RX ADMIN — METOPROLOL SUCCINATE 25 MG: 25 TABLET, FILM COATED, EXTENDED RELEASE ORAL at 07:29

## 2024-03-23 RX ADMIN — SACUBITRIL AND VALSARTAN 1 TABLET: 24; 26 TABLET, FILM COATED ORAL at 08:44

## 2024-03-23 RX ADMIN — APIXABAN 5 MG: 5 TABLET, FILM COATED ORAL at 21:00

## 2024-03-23 RX ADMIN — HYDROCODONE BITARTRATE AND ACETAMINOPHEN 1 TABLET: 7.5; 325 TABLET ORAL at 07:28

## 2024-03-23 RX ADMIN — ATORVASTATIN CALCIUM 10 MG: 10 TABLET, FILM COATED ORAL at 08:44

## 2024-03-23 RX ADMIN — APIXABAN 5 MG: 5 TABLET, FILM COATED ORAL at 08:43

## 2024-03-23 NOTE — PLAN OF CARE
Goal Outcome Evaluation:  Plan of Care Reviewed With: patient           Outcome Evaluation: Patient does well with one personassist. States she has had HHC previously and has a walker for home use. She is very anxious to leave for home

## 2024-03-23 NOTE — PROGRESS NOTES
"[facility] MEDICINE SERVICE  DAILY PROGRESS NOTE      Patient Name: Catalina Moreno  Date of Admission: 3/21/2024  Today's Date: 03/23/24  Length of Stay: 1  Primary Care Physician: Anayeli Costa APRN    Subjective   Patient is stable today.  She continues to have some nausea and facial pain, but no other complaints.  No overnight events.      Objective    Temp:  [97.5 °F (36.4 °C)-99.1 °F (37.3 °C)] 97.7 °F (36.5 °C)  Heart Rate:  [] 132  Resp:  [11-16] 16  BP: ()/(45-89) 136/61  Physical Exam  General: NAD, AAOx3  HEENT: AT NC, EOMI, bilateral periorbital bruising  Neck: No JVD  CVS: S1/S2 present, irregular tachycardia, no M/R/G  Lungs: CTA B/L  Abdomen: soft, NT, ND, BS+  Ext: no edema  Skin: no rashes, bruises or discolorations  Neuro: no focal deficits  Psych: Not agitated         Results Review:  I have reviewed the labs, radiology results, and diagnostic studies.    Laboratory Data:   Results from last 7 days   Lab Units 03/21/24  1732   WBC 10*3/mm3 10.43   HEMOGLOBIN g/dL 11.8*   HEMATOCRIT % 38.2   PLATELETS 10*3/mm3 276        Results from last 7 days   Lab Units 03/21/24  1732   SODIUM mmol/L 141   POTASSIUM mmol/L 4.3   CHLORIDE mmol/L 102   CO2 mmol/L 24.0   BUN mg/dL 18   CREATININE mg/dL 0.88   CALCIUM mg/dL 9.6   BILIRUBIN mg/dL 1.2   ALK PHOS U/L 90   ALT (SGPT) U/L 7   AST (SGOT) U/L 16   GLUCOSE mg/dL 109*       Culture Data:   No results found for: \"BLOODCX\"  No results found for: \"URINECX\"  No results found for: \"RESPCX\"  No results found for: \"WOUNDCX\"  No results found for: \"STOOLCX\"  No components found for: \"BODYFLD\"    Radiology Data:   Imaging Results (Last 24 Hours)       ** No results found for the last 24 hours. **            I have reviewed the patient's current medications.     Assessment/Plan     1) A-fib with RVR  - patient is back in RVR after initial resolution  - cardiology consulted, appreciate recs  - amiodarone drip  - metoprolol, eliquis  - " monitor on telemetry    2) fall  - mechanical  - PT/OT consulted, appreciate recs  - patient will need placement in SNF    3) HTN  - home meds    4) HLD  - lipitor    5) CHF?  - last TTE shows preserved EF  - home meds    6) CAD  - home meds  - does not appear to be on ASA as an outpatient    7) GI/DVT ppx  - none/eliquis            Electronically signed by Travis Sandoval MD, 03/23/24, 11:13 EDT.

## 2024-03-23 NOTE — PROGRESS NOTES
Cardiology Progress Note    Patient Identification:  Name: Catalina Moreno  Age: 83 y.o.  Sex: female  :  1940  MRN: [id number]                 Follow Up / Chief Complaint: A-fib with RVR, fall  Chief Complaint   Patient presents with    Fall       Interval History: Patient monitored 3/21/2024 with fall, left ankle and headache.       Subjective: Patient seen and examined.  Chart reviewed.  Labs reviewed.  Discussed with RN taking care of patient.      Objective:  Patient is having A-fib with RVR requiring IV amiodarone.    History of present illness:    Catalina Moreno is a 83 y.o. female with history of paroxysmal fibrillation chronic renal insufficiency HFrEF hypertension hyperlipidemia coronary artery disease presented to the hospital after a fall and has atrial fibrillation with rapid response.  Patient states that she does not have any symptoms of chest pain or any shortness of breath.  No complaint of any palpitations.  No dizziness syncope or swelling of the feet.  Patient has been taking her medicines regular.  In the ER patient had atrial fibrillation with rapid response.  She was admitted and started on IV Cardizem.     Assessment:    Atrial fibrillation with rapid ventricle response  Fall  History of pacemaker  Hypertension  Hyperlipidemia  Chronic HF R EF      Plan:    Telemetry to monitor rhythm  IV Cardizem drip was started patient developed hypotension will switch to IV amiodarone and monitor for side effects closely by monitoring rhythm hemodynamics and labs.  Patient's Entresto is on hold due to hypotension.  Patient has a high TZY1YC5-YHGt score due to age greater than 75, female gender, CHF, hypertension, vascular disease  ULZ2OE4-LALZ SCORE   QSA6AS8-WPEi Score: 6 (3/23/2024 10:27 AM)  But is a poor candidate for long-term anticoagulation due to falls.  Will discuss about watchman electively.    Copied text in this portion of the note has been reviewed and is accurate as of  3/23/2024    Past Medical History:  Past Medical History:   Diagnosis Date    Acquired spondylolisthesis of lumbosacral region     Acute kidney injury 07/22/2022    Anxiety     Arthritis     Atrial fibrillation     paroxysmal    Broken shoulder     left-- due to fall 11-7-19 was at [facility]    Chronic pain disorder     Chronic systolic CHF (congestive heart failure) 01/05/2023    EF 36-40%    Coronary artery disease involving native coronary artery of native heart without angina pectoris 04/02/2021    nonobstructive    DDD (degenerative disc disease), cervical     DDD (degenerative disc disease), lumbar     Depression     Edema     9/2020 foot    GERD (gastroesophageal reflux disease)     H/O fall     Heart failure with mid-range ejection fraction 03/05/2022    EF 45% per 2D echo    Hypertension     Hypothyroidism     Insomnia     Low back pain     Moderate mitral regurgitation 01/05/2023    Neuropathy     Nonrheumatic tricuspid valve regurgitation     Pain in both feet     Polypharmacy     PONV (postoperative nausea and vomiting)     Pulmonary hypertension     Radiculopathy     Restless legs     Sacroiliitis     Sick sinus syndrome     Added automatically from request for surgery [id number]    Spondylolisthesis     Stage 3a chronic kidney disease     Urinary incontinence     Vitamin D deficiency      Past Surgical History:  Past Surgical History:   Procedure Laterality Date    BACK SURGERY  07/19/2018    PDC &  PSF L3-L5 insitu    CARDIAC CATHETERIZATION N/A 4/2/2021    Procedure: Cardiac Catheterization/Vascular Study;  Surgeon: Barrett Evans MD;  Location: [facility] CATH INVASIVE LOCATION;  Service: Cardiovascular;  Laterality: N/A;    CARDIAC ELECTROPHYSIOLOGY PROCEDURE N/A 1/17/2023    Procedure: Pacemaker DC new;  Surgeon: Baljeet Valero MD;  Location: [facility] CATH INVASIVE LOCATION;  Service: Cardiovascular;  Laterality: N/A;    CHOLECYSTECTOMY      COLONOSCOPY      ENDOSCOPY N/A 9/14/2023     "Procedure: ESOPHAGOGASTRODUODENOSCOPY;  Surgeon: Ulysses Lamas MD;  Location: [facility] ENDOSCOPY;  Service: Gastroenterology;  Laterality: N/A;  gastritis, inflammatory gastric polyp    HYSTERECTOMY      ROTATOR CUFF REPAIR Left         Social History:   Social History     Tobacco Use    Smoking status: Never    Smokeless tobacco: Never   Substance Use Topics    Alcohol use: No      Family History:  Family History   Problem Relation Age of Onset    Cancer Father     Diabetes Son     Cancer Paternal Aunt     Heart disease Paternal Aunt     Cancer Paternal Uncle           Allergies:  Allergies   Allergen Reactions    Baclofen Rash    Codeine Nausea Only    Ibuprofen Unknown (See Comments)     Patient doesn't know----Motin    Methocarbamol Unknown (See Comments)     Patient doesn't know    Naproxen Unknown (See Comments)     Pt. Doesn't know     Tizanidine Hcl Other (See Comments)     Syncope     Tolmetin Dizziness     Pt. Doesn't know-- same as Tolectin     Scheduled Meds:  apixaban, 5 mg, Q12H  atorvastatin, 10 mg, Daily  Lidocaine, 2 patch, Q24H  metoprolol succinate XL, 25 mg, Daily  rOPINIRole, 0.25 mg, Nightly  sacubitril-valsartan, 1 tablet, BID  sennosides-docusate, 1 tablet, Daily          Review of Systems:   ROS  Review of Systems   Constitution: Negative for chills and fever.   Cardiovascular: Negative for chest pain and palpitations.   Respiratory: Negative for cough and hemoptysis.    Gastrointestinal: Negative for nausea.        Constitutional:  Temp:  [97.5 °F (36.4 °C)-99.1 °F (37.3 °C)] 97.8 °F (36.6 °C)  Heart Rate:  [] 132  Resp:  [11-21] 21  BP: ()/(49-89) 136/61    Physical Exam   /61   Pulse (!) 132   Temp 97.8 °F (36.6 °C) (Axillary)   Resp 21   Ht 160 cm (63\")   Wt 75.9 kg (167 lb 5.3 oz)   SpO2 97%   BMI 29.64 kg/m²   General:  Appears in no acute distress  Eyes: Raccoon eyes with ecchymosis around eye  HEENT:  No JVD. Thyroid not visibly enlarged. No mucosal " pallor or cyanosis  Respiratory: Respirations regular and unlabored at rest.  Clear to auscultation  Cardiovascular: S1,S2, irregularly irregular rate with  Gastrointestinal: Abdomen nondistended, soft  Musculoskeletal:  No abnormal movements  Extremities: No digital clubbing or cyanosis  Skin: Multiple ecchymotic spots.  Neuro: Alert and awake, no lateralizing deficits appreciated    INTAKE AND OUTPUT:    Intake/Output Summary (Last 24 hours) at 3/23/2024 1356  Last data filed at 3/23/2024 0801  Gross per 24 hour   Intake 1050 ml   Output 950 ml   Net 100 ml       Cardiographics  Telemetry: Atrial fibrillation    ECG:   ECG 12 Lead Drug Monitoring; Amiodarone   Preliminary Result   HEART RATE= 88  bpm   RR Interval= 665  ms   ME Interval=   ms   P Horizontal Axis=   deg   P Front Axis=   deg   QRSD Interval= 76  ms   QT Interval= 312  ms   QTcB= 383  ms   QRS Axis= 38  deg   T Wave Axis= 148  deg   - ABNORMAL ECG -   Atrial fibrillation   Low voltage, precordial leads   When compared with ECG of 21-Mar-2024 17:13:01,   Significant rate decrease   Significant repolarization change   Significant axis, voltage or hypertrophy change   Electronically Signed By:    Date and Time of Study: 2024-03-23 10:54:10      ECG 12 Lead Tachycardia   Final Result   HEART RATE= 138  bpm   RR Interval= 434  ms   ME Interval=   ms   P Horizontal Axis=   deg   P Front Axis=   deg   QRSD Interval= 73  ms   QT Interval=   ms   QTcB= Invalid  ms   QRS Axis= 37  deg   T Wave Axis= 72  deg   - ABNORMAL ECG -   Atrial fibrillation   Nonspecific T abnrm, anterolateral leads   When compared with ECG of 11-Jan-2024 2:04:23,   Significant change in rhythm   Electronically Signed By: Keyur Hernandez (KEVEN) 22-Mar-2024 05:54:47   Date and Time of Study: 2024-03-21 17:13:01      ECG 12 Lead QT Measurement    (Results Pending)     I have personally reviewed EKG    Echocardiogram: Results for orders placed during the hospital encounter of  "09/05/23    Adult Transthoracic Echo Complete W/ Cont if Necessary Per Protocol    Interpretation Summary    Left ventricular ejection fraction appears to be 51 - 55%.    The right ventricular cavity is moderately dilated.    The left atrial cavity is moderately dilated.    The right atrial cavity is mildly  dilated.    There is calcification of the aortic valve.    Moderate aortic valve regurgitation is present.    Estimated right ventricular systolic pressure from tricuspid regurgitation is mildly elevated (35-45 mmHg).      Lab Review   I have reviewed the labs  Results from last 7 days   Lab Units 03/21/24 2000 03/21/24  1732   HSTROP T ng/L 11 12     Results from last 7 days   Lab Units 03/21/24  1732   MAGNESIUM mg/dL 2.0     Results from last 7 days   Lab Units 03/21/24  1732   SODIUM mmol/L 141   POTASSIUM mmol/L 4.3   BUN mg/dL 18   CREATININE mg/dL 0.88   CALCIUM mg/dL 9.6         Results from last 7 days   Lab Units 03/21/24  1732   WBC 10*3/mm3 10.43   HEMOGLOBIN g/dL 11.8*   HEMATOCRIT % 38.2   PLATELETS 10*3/mm3 276     Results from last 7 days   Lab Units 03/21/24  1732   INR  1.04   APTT seconds 26.8*       RADIOLOGY:  Imaging Results (Last 24 Hours)       ** No results found for the last 24 hours. **                  )3/23/2024  MD FELISHA Guzmanon/Transcription:   \"Dictated utilizing Dragon dictation\".   "

## 2024-03-24 LAB
ANION GAP SERPL CALCULATED.3IONS-SCNC: 9 MMOL/L (ref 5–15)
BUN SERPL-MCNC: 14 MG/DL (ref 8–23)
BUN/CREAT SERPL: 16.9 (ref 7–25)
CALCIUM SPEC-SCNC: 9 MG/DL (ref 8.6–10.5)
CHLORIDE SERPL-SCNC: 101 MMOL/L (ref 98–107)
CO2 SERPL-SCNC: 29 MMOL/L (ref 22–29)
CREAT SERPL-MCNC: 0.83 MG/DL (ref 0.57–1)
EGFRCR SERPLBLD CKD-EPI 2021: 70.5 ML/MIN/1.73
GLUCOSE SERPL-MCNC: 108 MG/DL (ref 65–99)
POTASSIUM SERPL-SCNC: 4.1 MMOL/L (ref 3.5–5.2)
QT INTERVAL: 335 MS
QTC INTERVAL: 462 MS
SODIUM SERPL-SCNC: 139 MMOL/L (ref 136–145)
WHOLE BLOOD HOLD SPECIMEN: NORMAL

## 2024-03-24 PROCEDURE — 93005 ELECTROCARDIOGRAM TRACING: CPT | Performed by: INTERNAL MEDICINE

## 2024-03-24 PROCEDURE — 25010000002 ONDANSETRON PER 1 MG: Performed by: INTERNAL MEDICINE

## 2024-03-24 PROCEDURE — 80048 BASIC METABOLIC PNL TOTAL CA: CPT | Performed by: INTERNAL MEDICINE

## 2024-03-24 PROCEDURE — 99233 SBSQ HOSP IP/OBS HIGH 50: CPT | Performed by: INTERNAL MEDICINE

## 2024-03-24 PROCEDURE — 25010000002 AMIODARONE IN DEXTROSE 5% 360-4.14 MG/200ML-% SOLUTION: Performed by: INTERNAL MEDICINE

## 2024-03-24 RX ORDER — METOPROLOL SUCCINATE 25 MG/1
25 TABLET, EXTENDED RELEASE ORAL 2 TIMES DAILY
Status: DISCONTINUED | OUTPATIENT
Start: 2024-03-24 | End: 2024-03-25

## 2024-03-24 RX ORDER — ONDANSETRON 2 MG/ML
4 INJECTION INTRAMUSCULAR; INTRAVENOUS EVERY 6 HOURS PRN
Status: DISCONTINUED | OUTPATIENT
Start: 2024-03-24 | End: 2024-03-26 | Stop reason: HOSPADM

## 2024-03-24 RX ADMIN — APIXABAN 5 MG: 5 TABLET, FILM COATED ORAL at 08:59

## 2024-03-24 RX ADMIN — METOPROLOL SUCCINATE 25 MG: 25 TABLET, FILM COATED, EXTENDED RELEASE ORAL at 19:37

## 2024-03-24 RX ADMIN — AMIODARONE HYDROCHLORIDE 0.5 MG/MIN: 1.8 INJECTION, SOLUTION INTRAVENOUS at 17:23

## 2024-03-24 RX ADMIN — HYDROCODONE BITARTRATE AND ACETAMINOPHEN 1 TABLET: 7.5; 325 TABLET ORAL at 21:45

## 2024-03-24 RX ADMIN — METOPROLOL SUCCINATE 25 MG: 25 TABLET, FILM COATED, EXTENDED RELEASE ORAL at 08:58

## 2024-03-24 RX ADMIN — APIXABAN 5 MG: 5 TABLET, FILM COATED ORAL at 20:19

## 2024-03-24 RX ADMIN — HYDROCODONE BITARTRATE AND ACETAMINOPHEN 1 TABLET: 7.5; 325 TABLET ORAL at 09:00

## 2024-03-24 RX ADMIN — ONDANSETRON 4 MG: 2 INJECTION INTRAMUSCULAR; INTRAVENOUS at 10:22

## 2024-03-24 RX ADMIN — AMIODARONE HYDROCHLORIDE 0.5 MG/MIN: 1.8 INJECTION, SOLUTION INTRAVENOUS at 14:11

## 2024-03-24 RX ADMIN — HYDROCODONE BITARTRATE AND ACETAMINOPHEN 1 TABLET: 7.5; 325 TABLET ORAL at 17:22

## 2024-03-24 RX ADMIN — DOCUSATE SODIUM AND SENNOSIDES 1 TABLET: 8.6; 5 TABLET, FILM COATED ORAL at 08:59

## 2024-03-24 RX ADMIN — ROPINIROLE HYDROCHLORIDE 0.25 MG: 0.25 TABLET, FILM COATED ORAL at 20:19

## 2024-03-24 RX ADMIN — LIDOCAINE 1 PATCH: 4 PATCH TOPICAL at 08:59

## 2024-03-24 RX ADMIN — AMIODARONE HYDROCHLORIDE 0.5 MG/MIN: 1.8 INJECTION, SOLUTION INTRAVENOUS at 05:41

## 2024-03-24 RX ADMIN — Medication 5 MG: at 21:40

## 2024-03-24 RX ADMIN — ATORVASTATIN CALCIUM 10 MG: 10 TABLET, FILM COATED ORAL at 08:59

## 2024-03-24 NOTE — PROGRESS NOTES
"[facility] MEDICINE SERVICE  DAILY PROGRESS NOTE      Patient Name: Catalina Moreno  Date of Admission: 3/21/2024  Today's Date: 03/24/24  Length of Stay: 2  Primary Care Physician: No primary care provider on file.    Subjective   Patient is feeling very nauseated today.  No other complaints.  She is reluctant to go to rehab because she needs to arrange care for her dog.  No overnight events.      Objective    Temp:  [97.7 °F (36.5 °C)-98.1 °F (36.7 °C)] 97.7 °F (36.5 °C)  Heart Rate:  [] 115  Resp:  [12-21] 17  BP: ()/(38-88) 160/82  Physical Exam  General: NAD, AAOx3  HEENT: AT NC, EOMI, bilateral periorbital bruising  Neck: No JVD  CVS: S1/S2 present, irregular tachycardia, no M/R/G  Lungs: CTA B/L  Abdomen: soft, NT, ND, BS+  Ext: no edema  Skin: no rashes, bruises or discolorations  Neuro: no focal deficits  Psych: Not agitated         Results Review:  I have reviewed the labs, radiology results, and diagnostic studies.    Laboratory Data:   Results from last 7 days   Lab Units 03/21/24  1732   WBC 10*3/mm3 10.43   HEMOGLOBIN g/dL 11.8*   HEMATOCRIT % 38.2   PLATELETS 10*3/mm3 276        Results from last 7 days   Lab Units 03/21/24  1732   SODIUM mmol/L 141   POTASSIUM mmol/L 4.3   CHLORIDE mmol/L 102   CO2 mmol/L 24.0   BUN mg/dL 18   CREATININE mg/dL 0.88   CALCIUM mg/dL 9.6   BILIRUBIN mg/dL 1.2   ALK PHOS U/L 90   ALT (SGPT) U/L 7   AST (SGOT) U/L 16   GLUCOSE mg/dL 109*       Culture Data:   No results found for: \"BLOODCX\"  No results found for: \"URINECX\"  No results found for: \"RESPCX\"  No results found for: \"WOUNDCX\"  No results found for: \"STOOLCX\"  No components found for: \"BODYFLD\"    Radiology Data:   Imaging Results (Last 24 Hours)       ** No results found for the last 24 hours. **            I have reviewed the patient's current medications.     Assessment/Plan     1) A-fib with RVR  - patient is back in RVR after initial resolution  - cardiology consulted, appreciate " recs  - amiodarone drip  - metoprolol, eliquis  - monitor on telemetry    2) fall  - mechanical  - PT/OT consulted, appreciate recs  - patient will need placement in SNF    3) HTN  - home meds    4) HLD  - lipitor    5) CHF?  - last TTE shows preserved EF  - home meds    6) CAD  - home meds  - does not appear to be on ASA as an outpatient    7) GI/DVT ppx  - none/eliquis            Electronically signed by Travis Sandoval MD, 03/24/24, 10:17 EDT.

## 2024-03-24 NOTE — PROGRESS NOTES
Cardiology Progress Note    Patient Identification:  Name: Catalina Moreno  Age: 83 y.o.  Sex: female  :  1940  MRN: [id number]                 Follow Up / Chief Complaint: A-fib with RVR, fall  Chief Complaint   Patient presents with    Fall       Interval History: Patient presented 3/21/2024 with fall, left ankle and headache.  Had A-fib with RVR.  Is on IV amiodarone       Subjective: Patient seen and examined.  Chart reviewed.  Labs reviewed.  Discussed with RN taking care of patient.  Patient is in A-fib with RVR requiring IV amiodarone.  Is complaining of some nausea and constipation.      Objective:  Patient is having A-fib with RVR requiring IV amiodarone.    History of present illness:    Catalina Moreno is a 83 y.o. female with history of paroxysmal fibrillation chronic renal insufficiency HFrEF hypertension hyperlipidemia coronary artery disease presented to the hospital after a fall and has atrial fibrillation with rapid response.  Patient states that she does not have any symptoms of chest pain or any shortness of breath.  No complaint of any palpitations.  No dizziness syncope or swelling of the feet.  Patient has been taking her medicines regular.  In the ER patient had atrial fibrillation with rapid response.  She was admitted and started on IV Cardizem.     Assessment:    Atrial fibrillation with rapid ventricle response  Fall  History of pacemaker  Hypertension  Hyperlipidemia  Chronic HF R EF      Plan:    Telemetry is revealing A-fib with RVR.  Patient was not tolerating IV Cardizem due to hypotension  Will switch to IV amiodarone.  Will continue amiodarone and monitor heart rate, blood pressure, rhythm closely.  Patient's Entresto is on hold due to hypotension.  Patient has a high YXG8CN4-HABk score due to age greater than 75, female gender, CHF, hypertension, vascular disease  [id number] SCORE   CUC5GL6-AHTu Score: 6 (3/23/2024  2:01 PM)  But is a poor candidate for long-term  anticoagulation due to falls.  Will discuss about watchman electively.    Copied text in this portion of the note has been reviewed and is accurate as of 3/24/2024    Past Medical History:  Past Medical History:   Diagnosis Date    Acquired spondylolisthesis of lumbosacral region     Acute kidney injury 07/22/2022    Anxiety     Arthritis     Atrial fibrillation     paroxysmal    Broken shoulder     left-- due to fall 11-7-19 was at [facility]    Chronic pain disorder     Chronic systolic CHF (congestive heart failure) 01/05/2023    EF 36-40%    Coronary artery disease involving native coronary artery of native heart without angina pectoris 04/02/2021    nonobstructive    DDD (degenerative disc disease), cervical     DDD (degenerative disc disease), lumbar     Depression     Edema     9/2020 foot    GERD (gastroesophageal reflux disease)     H/O fall     Heart failure with mid-range ejection fraction 03/05/2022    EF 45% per 2D echo    Hypertension     Hypothyroidism     Insomnia     Low back pain     Moderate mitral regurgitation 01/05/2023    Neuropathy     Nonrheumatic tricuspid valve regurgitation     Pain in both feet     Polypharmacy     PONV (postoperative nausea and vomiting)     Pulmonary hypertension     Radiculopathy     Restless legs     Sacroiliitis     Sick sinus syndrome     Added automatically from request for surgery [id number]    Spondylolisthesis     Stage 3a chronic kidney disease     Urinary incontinence     Vitamin D deficiency      Past Surgical History:  Past Surgical History:   Procedure Laterality Date    BACK SURGERY  07/19/2018    PDC &  PSF L3-L5 insitu    CARDIAC CATHETERIZATION N/A 4/2/2021    Procedure: Cardiac Catheterization/Vascular Study;  Surgeon: Barrett Evans MD;  Location: [facility] INVASIVE LOCATION;  Service: Cardiovascular;  Laterality: N/A;    CARDIAC ELECTROPHYSIOLOGY PROCEDURE N/A 1/17/2023    Procedure: Pacemaker DC new;  Surgeon: Baljeet Valero MD;   "Location: [facility] CATH INVASIVE LOCATION;  Service: Cardiovascular;  Laterality: N/A;    CHOLECYSTECTOMY      COLONOSCOPY      ENDOSCOPY N/A 9/14/2023    Procedure: ESOPHAGOGASTRODUODENOSCOPY;  Surgeon: Ulysses Lamas MD;  Location: [facility] ENDOSCOPY;  Service: Gastroenterology;  Laterality: N/A;  gastritis, inflammatory gastric polyp    HYSTERECTOMY      ROTATOR CUFF REPAIR Left         Social History:   Social History     Tobacco Use    Smoking status: Never    Smokeless tobacco: Never   Substance Use Topics    Alcohol use: No      Family History:  Family History   Problem Relation Age of Onset    Cancer Father     Diabetes Son     Cancer Paternal Aunt     Heart disease Paternal Aunt     Cancer Paternal Uncle           Allergies:  Allergies   Allergen Reactions    Baclofen Rash    Codeine Nausea Only    Ibuprofen Unknown (See Comments)     Patient doesn't know----Motin    Methocarbamol Unknown (See Comments)     Patient doesn't know    Naproxen Unknown (See Comments)     Pt. Doesn't know     Tizanidine Hcl Other (See Comments)     Syncope     Tolmetin Dizziness     Pt. Doesn't know-- same as Tolectin     Scheduled Meds:  apixaban, 5 mg, Q12H  atorvastatin, 10 mg, Daily  Lidocaine, 2 patch, Q24H  metoprolol succinate XL, 25 mg, Daily  rOPINIRole, 0.25 mg, Nightly  sacubitril-valsartan, 1 tablet, BID  sennosides-docusate, 1 tablet, Daily          Review of Systems:   Review of Systems   Gastrointestinal:  Positive for constipation and nausea.            Constitutional:  Temp:  [97.7 °F (36.5 °C)-98.1 °F (36.7 °C)] 97.7 °F (36.5 °C)  Heart Rate:  [] 115  Resp:  [12-21] 17  BP: ()/(38-88) 160/82    Physical Exam   /82   Pulse 115   Temp 97.7 °F (36.5 °C) (Oral)   Resp 17   Ht 160 cm (63\")   Wt 75.9 kg (167 lb 5.3 oz)   SpO2 94%   BMI 29.64 kg/m²   General:  Appears in no acute distress  Eyes: Raccoon eyes with ecchymosis around eye  HEENT:  No JVD. Thyroid not visibly enlarged. No " mucosal pallor or cyanosis  Respiratory: Respirations regular and unlabored at rest.  Clear to auscultation  Cardiovascular: S1,S2, irregularly irregular rate with  Gastrointestinal: Abdomen nondistended, soft  Musculoskeletal:  No abnormal movements  Extremities: No digital clubbing or cyanosis  Skin: Multiple ecchymotic spots.  Neuro: Alert and awake, no lateralizing deficits appreciated    INTAKE AND OUTPUT:    Intake/Output Summary (Last 24 hours) at 3/24/2024 0910  Last data filed at 3/24/2024 0900  Gross per 24 hour   Intake 1168.41 ml   Output 850 ml   Net 318.41 ml       Cardiographics  Telemetry: Atrial fibrillation    ECG:   ECG 12 Lead QT Measurement   Preliminary Result   HEART RATE= 114  bpm   RR Interval= 525  ms   NH Interval=   ms   P Horizontal Axis=   deg   P Front Axis=   deg   QRSD Interval= 80  ms   QT Interval= 335  ms   QTcB= 462  ms   QRS Axis= 35  deg   T Wave Axis=   deg   - ABNORMAL ECG -   Atrial fibrillation   Low voltage, precordial leads   Consider anteroseptal infarct   Nonspecific T abnormalities, lateral leads   When compared with ECG of 23-Mar-2024 10:54:10,   Significant rate increase   Electronically Signed By:    Date and Time of Study: 2024-03-24 05:24:30      ECG 12 Lead Drug Monitoring; Amiodarone   Preliminary Result   HEART RATE= 88  bpm   RR Interval= 665  ms   NH Interval=   ms   P Horizontal Axis=   deg   P Front Axis=   deg   QRSD Interval= 76  ms   QT Interval= 312  ms   QTcB= 383  ms   QRS Axis= 38  deg   T Wave Axis= 148  deg   - ABNORMAL ECG -   Atrial fibrillation   Low voltage, precordial leads   When compared with ECG of 21-Mar-2024 17:13:01,   Significant rate decrease   Significant repolarization change   Significant axis, voltage or hypertrophy change   Electronically Signed By:    Date and Time of Study: 2024-03-23 10:54:10      ECG 12 Lead Tachycardia   Final Result   HEART RATE= 138  bpm   RR Interval= 434  ms   NH Interval=   ms   P Horizontal Axis=   deg  "  P Front Axis=   deg   QRSD Interval= 73  ms   QT Interval=   ms   QTcB= Invalid  ms   QRS Axis= 37  deg   T Wave Axis= 72  deg   - ABNORMAL ECG -   Atrial fibrillation   Nonspecific T abnrm, anterolateral leads   When compared with ECG of 11-Jan-2024 2:04:23,   Significant change in rhythm   Electronically Signed By: Keyur Hernandez (KEVEN) 22-Mar-2024 05:54:47   Date and Time of Study: 2024-03-21 17:13:01      ECG 12 Lead Drug Monitoring; Amiodarone    (Results Pending)     I have personally reviewed EKG    Echocardiogram: Results for orders placed during the hospital encounter of 09/05/23    Adult Transthoracic Echo Complete W/ Cont if Necessary Per Protocol    Interpretation Summary    Left ventricular ejection fraction appears to be 51 - 55%.    The right ventricular cavity is moderately dilated.    The left atrial cavity is moderately dilated.    The right atrial cavity is mildly  dilated.    There is calcification of the aortic valve.    Moderate aortic valve regurgitation is present.    Estimated right ventricular systolic pressure from tricuspid regurgitation is mildly elevated (35-45 mmHg).      Lab Review   I have reviewed the labs  Results from last 7 days   Lab Units 03/21/24 2000 03/21/24  1732   HSTROP T ng/L 11 12     Results from last 7 days   Lab Units 03/21/24  1732   MAGNESIUM mg/dL 2.0     Results from last 7 days   Lab Units 03/21/24  1732   SODIUM mmol/L 141   POTASSIUM mmol/L 4.3   BUN mg/dL 18   CREATININE mg/dL 0.88   CALCIUM mg/dL 9.6         Results from last 7 days   Lab Units 03/21/24  1732   WBC 10*3/mm3 10.43   HEMOGLOBIN g/dL 11.8*   HEMATOCRIT % 38.2   PLATELETS 10*3/mm3 276     Results from last 7 days   Lab Units 03/21/24  1732   INR  1.04   APTT seconds 26.8*       RADIOLOGY:  Imaging Results (Last 24 Hours)       ** No results found for the last 24 hours. **                  )3/24/2024  Nasir Celis MD      EMR Dragon/Transcription:   \"Dictated utilizing Dragon " "dictation\".   "

## 2024-03-24 NOTE — CASE MANAGEMENT/SOCIAL WORK
Continued Stay Note  [facility]     Patient Name: Catalina Moreno  MRN: [id number]  Today's Date: 3/24/2024    Admit Date: 3/21/2024    Plan: [facility] accepted.Potential bed available tomorrow. No precert required. PASRR approved. EMS vs  van for transport.   Discharge Plan       Row Name 03/24/24 1002       Plan    Plan [facility] accepted.Potential bed available tomorrow. No precert required. PASRR approved. EMS vs  van for transport.    Plan Comments Confimed with [facility] that [facility] can accept and anticipate a bed to be available tomorrow. Pt was unable to ambulate with PT this weekned and will likely need EMS for transport to facility.

## 2024-03-24 NOTE — PLAN OF CARE
Goal Outcome Evaluation:         Continue amio drip at 0.5 mg/min per Dr. Celis. Still running afib with a HR from low 100s to high 110s. Room air. BP wnl. Manage pain per mar. Care on going.

## 2024-03-24 NOTE — NURSING NOTE
Pt still on afib, HR hovering from 110s to 130s, on amio drip at 0.5mg/min. Paged cardiology about this and spoke with Dr. Nunez. Received a telephone order to increase metoprolol po from 25mg daily to bid.

## 2024-03-25 LAB
ANION GAP SERPL CALCULATED.3IONS-SCNC: 9 MMOL/L (ref 5–15)
BUN SERPL-MCNC: 15 MG/DL (ref 8–23)
BUN/CREAT SERPL: 17.6 (ref 7–25)
CALCIUM SPEC-SCNC: 8.8 MG/DL (ref 8.6–10.5)
CHLORIDE SERPL-SCNC: 100 MMOL/L (ref 98–107)
CO2 SERPL-SCNC: 29 MMOL/L (ref 22–29)
CREAT SERPL-MCNC: 0.85 MG/DL (ref 0.57–1)
EGFRCR SERPLBLD CKD-EPI 2021: 68.5 ML/MIN/1.73
GLUCOSE SERPL-MCNC: 117 MG/DL (ref 65–99)
POTASSIUM SERPL-SCNC: 4.2 MMOL/L (ref 3.5–5.2)
SODIUM SERPL-SCNC: 138 MMOL/L (ref 136–145)

## 2024-03-25 PROCEDURE — 99233 SBSQ HOSP IP/OBS HIGH 50: CPT | Performed by: INTERNAL MEDICINE

## 2024-03-25 PROCEDURE — 97116 GAIT TRAINING THERAPY: CPT

## 2024-03-25 PROCEDURE — 93010 ELECTROCARDIOGRAM REPORT: CPT | Performed by: INTERNAL MEDICINE

## 2024-03-25 PROCEDURE — 80048 BASIC METABOLIC PNL TOTAL CA: CPT | Performed by: INTERNAL MEDICINE

## 2024-03-25 PROCEDURE — 25010000002 AMIODARONE IN DEXTROSE 5% 360-4.14 MG/200ML-% SOLUTION: Performed by: INTERNAL MEDICINE

## 2024-03-25 PROCEDURE — 97530 THERAPEUTIC ACTIVITIES: CPT

## 2024-03-25 RX ORDER — METOPROLOL SUCCINATE 25 MG/1
25 TABLET, EXTENDED RELEASE ORAL EVERY 12 HOURS SCHEDULED
Status: DISCONTINUED | OUTPATIENT
Start: 2024-03-25 | End: 2024-03-26 | Stop reason: HOSPADM

## 2024-03-25 RX ORDER — AMIODARONE HYDROCHLORIDE 200 MG/1
200 TABLET ORAL 2 TIMES DAILY
Status: DISCONTINUED | OUTPATIENT
Start: 2024-03-25 | End: 2024-03-26 | Stop reason: HOSPADM

## 2024-03-25 RX ADMIN — HYDROCODONE BITARTRATE AND ACETAMINOPHEN 1 TABLET: 7.5; 325 TABLET ORAL at 03:26

## 2024-03-25 RX ADMIN — Medication 5 MG: at 20:47

## 2024-03-25 RX ADMIN — AMIODARONE HYDROCHLORIDE 0.5 MG/MIN: 1.8 INJECTION, SOLUTION INTRAVENOUS at 04:44

## 2024-03-25 RX ADMIN — APIXABAN 5 MG: 5 TABLET, FILM COATED ORAL at 09:57

## 2024-03-25 RX ADMIN — MUPIROCIN 1 APPLICATION: 20 OINTMENT TOPICAL at 20:47

## 2024-03-25 RX ADMIN — HYDROCODONE BITARTRATE AND ACETAMINOPHEN 1 TABLET: 7.5; 325 TABLET ORAL at 14:36

## 2024-03-25 RX ADMIN — ATORVASTATIN CALCIUM 10 MG: 10 TABLET, FILM COATED ORAL at 09:57

## 2024-03-25 RX ADMIN — SACUBITRIL AND VALSARTAN 1 TABLET: 24; 26 TABLET, FILM COATED ORAL at 09:56

## 2024-03-25 RX ADMIN — APIXABAN 5 MG: 5 TABLET, FILM COATED ORAL at 20:47

## 2024-03-25 RX ADMIN — HYDROCODONE BITARTRATE AND ACETAMINOPHEN 1 TABLET: 7.5; 325 TABLET ORAL at 20:47

## 2024-03-25 RX ADMIN — METOPROLOL SUCCINATE 25 MG: 25 TABLET, FILM COATED, EXTENDED RELEASE ORAL at 20:47

## 2024-03-25 RX ADMIN — AMIODARONE HYDROCHLORIDE 200 MG: 200 TABLET ORAL at 20:47

## 2024-03-25 RX ADMIN — DOCUSATE SODIUM AND SENNOSIDES 1 TABLET: 8.6; 5 TABLET, FILM COATED ORAL at 09:56

## 2024-03-25 RX ADMIN — ROPINIROLE HYDROCHLORIDE 0.25 MG: 0.25 TABLET, FILM COATED ORAL at 20:47

## 2024-03-25 RX ADMIN — AMIODARONE HYDROCHLORIDE 200 MG: 200 TABLET ORAL at 09:56

## 2024-03-25 RX ADMIN — METOPROLOL SUCCINATE 25 MG: 25 TABLET, FILM COATED, EXTENDED RELEASE ORAL at 09:57

## 2024-03-25 RX ADMIN — SACUBITRIL AND VALSARTAN 1 TABLET: 24; 26 TABLET, FILM COATED ORAL at 20:47

## 2024-03-25 NOTE — PROGRESS NOTES
"[facility] MEDICINE SERVICE  DAILY PROGRESS NOTE      Patient Name: Catalina Moreno  Date of Admission: 3/21/2024  Today's Date: 03/25/24  Length of Stay: 3  Primary Care Physician: No primary care provider on file.    Subjective   Patient is stable today.  No complaints, but she is upset that nobody has checked on her dog yet.  No overnight events.      Objective    Temp:  [97.2 °F (36.2 °C)-98.3 °F (36.8 °C)] 97.2 °F (36.2 °C)  Heart Rate:  [] 97  Resp:  [12-17] 16  BP: ()/() 117/86  Physical Exam  General: NAD, AAOx3  HEENT: AT NC, EOMI, bilateral periorbital bruising  Neck: No JVD  CVS: S1/S2 present, irregular tachycardia, no M/R/G  Lungs: CTA B/L  Abdomen: soft, NT, ND, BS+  Ext: no edema  Skin: no rashes, bruises or discolorations  Neuro: no focal deficits  Psych: Not agitated         Results Review:  I have reviewed the labs, radiology results, and diagnostic studies.    Laboratory Data:   Results from last 7 days   Lab Units 03/21/24  1732   WBC 10*3/mm3 10.43   HEMOGLOBIN g/dL 11.8*   HEMATOCRIT % 38.2   PLATELETS 10*3/mm3 276        Results from last 7 days   Lab Units 03/25/24  0508 03/24/24  1244 03/21/24  1732   SODIUM mmol/L 138 139 141   POTASSIUM mmol/L 4.2 4.1 4.3   CHLORIDE mmol/L 100 101 102   CO2 mmol/L 29.0 29.0 24.0   BUN mg/dL 15 14 18   CREATININE mg/dL 0.85 0.83 0.88   CALCIUM mg/dL 8.8 9.0 9.6   BILIRUBIN mg/dL  --   --  1.2   ALK PHOS U/L  --   --  90   ALT (SGPT) U/L  --   --  7   AST (SGOT) U/L  --   --  16   GLUCOSE mg/dL 117* 108* 109*       Culture Data:   No results found for: \"BLOODCX\"  No results found for: \"URINECX\"  No results found for: \"RESPCX\"  No results found for: \"WOUNDCX\"  No results found for: \"STOOLCX\"  No components found for: \"BODYFLD\"    Radiology Data:   Imaging Results (Last 24 Hours)       ** No results found for the last 24 hours. **            I have reviewed the patient's current medications.     Assessment/Plan     1) A-fib with " RVR  - patient is back in RVR after initial resolution  - cardiology consulted, appreciate recs  - amiodarone drip  - metoprolol, eliquis  - monitor on telemetry    2) fall  - mechanical  - PT/OT consulted, appreciate recs  - patient will need placement in SNF    3) HTN  - home meds    4) HLD  - lipitor    5) CHF?  - last TTE shows preserved EF  - home meds    6) CAD  - home meds  - does not appear to be on ASA as an outpatient    7) GI/DVT ppx  - none/eliquis            Electronically signed by Travis Sandoval MD, 03/25/24, 10:17 EDT.
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
## 2024-03-25 NOTE — PLAN OF CARE
Assessment: Catalina Moreno presents with functional mobility impairments which indicate the need for skilled intervention. Pt tolerated treatment well this date, ambulating with MIN A with assistance for Rwx management and cueing for safety.Tolerating session today without incident. Will continue to follow and progress as tolerated.

## 2024-03-25 NOTE — THERAPY TREATMENT NOTE
"Subjective: Pt agreeable to therapeutic plan of care.  Cognition: arousal/alertness: Alert and Attentive    Objective:     Bed Mobility: Min-A   Functional Transfers: Min-A and with rolling walker     Balance: dynamic, with UE support, and standing Supervision  Functional Ambulation: CGA and with rolling walker    Lower Body Dressing: Min-A  ADL Position: unsupported sitting  ADL Comments: Rosangela brief     Vitals: WNL    Pain: 0 VAS  Location: NA  Interventions for pain: N/A  Education: Provided education on the importance of mobility in the acute care setting      Assessment: Catalina Moreno presents with ADL impairments affecting function including balance, endurance / activity tolerance, shortness of breath, and strength. Demonstrated functioning below baseline abilities indicate the need for continued skilled intervention while inpatient. Tolerating session today without incident. Will continue to follow and progress as tolerated.     Plan/Recommendations:   Moderate Intensity Therapy recommended post-acute care. This is recommended as therapy feels the patient would require 3-4 days per week and wouldn't tolerate \"3 hour daily\" rehab intensity. SNF would be the preferred choice. If the patient does not agree to SNF, arrange HH or OP depending on home bound status. If patient is medically complex, consider LTACH.. Pt requires no DME at discharge.     Pt desires Skilled Rehab placement at discharge. Pt cooperative; agreeable to therapeutic recommendations and plan of care.     Modified Derek: N/A = No pre-op stroke/TIA    Post-Tx Position: Up in Chair, Alarms activated, and Call light and personal items within reach  PPE: gloves    "

## 2024-03-25 NOTE — PROGRESS NOTES
"CARDIOLOGY PROGRESS NOTE:    Catalina Moreno  83 y.o.  female  1940  [id number]      Referring Provider: Hospitalist    Reason for follow-up: Atrial fibrillation     Patient Care Team:  Flash Gonzalez MD as Consulting Physician (Cardiology)    Subjective .  Patient is doing well without any symptoms    Objective lying in bed comfortably     Review of Systems   Constitutional: Negative for malaise/fatigue.   Cardiovascular:  Negative for chest pain, dyspnea on exertion, leg swelling and palpitations.   Respiratory:  Negative for cough and shortness of breath.    Gastrointestinal:  Negative for abdominal pain, nausea and vomiting.   Neurological:  Negative for dizziness, focal weakness, headaches, light-headedness and numbness.   All other systems reviewed and are negative.      Allergies: Baclofen, Codeine, Ibuprofen, Methocarbamol, Naproxen, Tizanidine hcl, and Tolmetin    Scheduled Meds:amiodarone, 200 mg, Oral, BID  amiodarone, 150 mg, Intravenous, Once  apixaban, 5 mg, Oral, Q12H  atorvastatin, 10 mg, Oral, Daily  Lidocaine, 2 patch, Transdermal, Q24H  metoprolol succinate XL, 25 mg, Oral, BID  mupirocin, 1 Application, Topical, Q12H  rOPINIRole, 0.25 mg, Oral, Nightly  sacubitril-valsartan, 1 tablet, Oral, BID  sennosides-docusate, 1 tablet, Oral, Daily      Continuous Infusions:   PRN Meds:.  HYDROcodone-acetaminophen    influenza vaccine    melatonin    ondansetron    [COMPLETED] Insert Peripheral IV **AND** sodium chloride        VITAL SIGNS  Vitals:    03/25/24 1200 03/25/24 1300 03/25/24 1400 03/25/24 1743   BP:   103/44 119/75   BP Location:    Left arm   Patient Position:    Sitting   Pulse: 109 102 97    Resp:    28   Temp:    98.1 °F (36.7 °C)   TempSrc:    Oral   SpO2: 94% 92% 91%    Weight:       Height:           Flowsheet Rows      Flowsheet Row First Filed Value   Admission Height 160 cm (63\") Documented at 03/21/2024 1537   Admission Weight 69.4 kg (153 lb) Documented at 03/21/2024 1537 "             TELEMETRY: Atrial fibrillation with controlled ventricular response    Physical Exam:  Constitutional:       Appearance: Well-developed.   Eyes:      General: No scleral icterus.     Conjunctiva/sclera: Conjunctivae normal.   HENT:      Head: Normocephalic and atraumatic.   Neck:      Vascular: No carotid bruit or JVD.   Pulmonary:      Effort: Pulmonary effort is normal.      Breath sounds: Normal breath sounds. No wheezing. No rales.   Cardiovascular:      Normal rate. Regular rhythm.   Pulses:     Intact distal pulses.   Abdominal:      General: Bowel sounds are normal.      Palpations: Abdomen is soft.   Musculoskeletal:      Cervical back: Normal range of motion and neck supple. Skin:     General: Skin is warm and dry.      Findings: No rash.   Neurological:      Mental Status: Alert.          Results Review:   I reviewed the patient's new clinical results.  Lab Results (last 24 hours)       Procedure Component Value Units Date/Time    Basic Metabolic Panel [[id number]]  (Abnormal) Collected: 03/25/24 0508    Specimen: Blood Updated: 03/25/24 0539     Glucose 117 mg/dL      BUN 15 mg/dL      Creatinine 0.85 mg/dL      Sodium 138 mmol/L      Potassium 4.2 mmol/L      Chloride 100 mmol/L      CO2 29.0 mmol/L      Calcium 8.8 mg/dL      BUN/Creatinine Ratio 17.6     Anion Gap 9.0 mmol/L      eGFR 68.5 mL/min/1.73     Narrative:      GFR Normal >60  Chronic Kidney Disease <60  Kidney Failure <15    The GFR formula is only valid for adults with stable renal function between ages 18 and 70.            Imaging Results (Last 24 Hours)       ** No results found for the last 24 hours. **            EKG      I personally viewed and interpreted the patient's EKG/Telemetry data:    ECHOCARDIOGRAM:  Results for orders placed during the hospital encounter of 09/05/23    Adult Transthoracic Echo Complete W/ Cont if Necessary Per Protocol    Interpretation Summary    Left ventricular ejection fraction appears to be  51 - 55%.    The right ventricular cavity is moderately dilated.    The left atrial cavity is moderately dilated.    The right atrial cavity is mildly  dilated.    There is calcification of the aortic valve.    Moderate aortic valve regurgitation is present.    Estimated right ventricular systolic pressure from tricuspid regurgitation is mildly elevated (35-45 mmHg).       STRESS MYOVIEW:  Results for orders placed during the hospital encounter of 03/31/21    Stress Test With Myocardial Perfusion One Day    Interpretation Summary  · Findings consistent with a normal ECG stress test.  · Left ventricular ejection fraction is hyperdynamic (Calculated EF > 70%). .  · Impressions are consistent with a study indicating uncertain risk.  · Large apical defect with some reperfusion during the rest images cannot rule out ischemia EF 76% Clinical correlation is recommended.    Electronically signed by KALYANI Tay, 04/01/21, 11:25 AM EDT.       CARDIAC CATHETERIZATION:  Results for orders placed during the hospital encounter of 03/31/21    Cardiac Catheterization/Vascular Study    Narrative  CARDIAC CATHETERIZATION REPORT    DATE OF PROCEDURE: 4/2/2021      INDICATION FOR PROCEDURE: Abnormal stress test    PROCEDURE PERFORMED: Left heart catheterization, coronary angiography,    PROCEDURE COMMENTS:    All the risks and benefits explained with the patient informed consent was obtained from the patient.  Patient was brought to the cardiac catheterization laboratory placed on the table draped and prepped in the usual sterile fashion.  2% lidocaine was used to anesthetize the right groin area.  Using the modified Seldinger technique 5 Croatian sheath was inserted into the right femoral artery.    5 Croatian JL4 catheter was used to perform the selective left coronary angiography    5 Croatian JR4 catheter was used to perform the selective right coronary angiography    Angio-Seal was placed after exchanging five Croatian sheath  with six [place] sheath    Patient tolerated procedure well without any immediate complications      FINDINGS:    1. HEMODYNAMICS:  LV end-diastolic pressure 7 mmHg, /80 mmHg.    2. LEFT VENTRICULOGRAPHY: Not done patient had echocardiogram    3. CORONARY ANGIOGRAPHY:  Dominance right  Left Main: Large-caliber vessel bifurcates into LAD circumflex artery 0% stenosis  LAD: Large-caliber vessel gives rise to couple of medium caliber diagonal arteries multiple septal branches reaches the apex mild luminal irregularities noted less than 10% stenosis  CIRC: Large-caliber vessel gives rise to marginal lateral branches less than 10% stenosis  RCA: Large-caliber dominant artery gives rise to PDA and PL branches less than 5 to 10% stenosis    SUMMARY: No obstructive coronary artery disease    RECOMMENDATIONS: Evaluate for noncardiac causes of symptoms aggressive cardiac risk factor modification       OTHER:         Assessment & Plan     Atrial fibrillation  Patient presented with atrial fibrillation with rapid response and was started on IV amiodarone and switch to oral amiodarone  Patient is also on beta-blockers and Eliquis for anticoagulation  Patient's heart rate is better  Patient is ruled out for MI by EKG and enzymes  Patient also has history of falls and hence will discuss with patient about Watchman procedure.  Her [id number] score is high but her has bled score is also high at this time.      Sick sinus syndrome  Patient is status post permanent pacemaker placement and the pacemaker is working very well.    Coronary artery disease  Patient has very mild coronary disease and does not have any angina at this time.    Valvular heart disease  Patient has moderate aortic valve regurgitation with normal LV function is currently stable on medical therapy    Hypertension  Patient is currently stable on metoprolol and Entresto and is stable.    HFrEF  Patient has history of congestive heart failure with LV systolic  dysfunction but with medical therapy with metoprolol and Entresto her blood pressure has been stable and her LV function has improved to 50 to 55%.    Hyperlipidemia  Patient is currently on statins.  I discussed the patients findings and my recommendations with patient and nurse    Flash Gonzalez MD  03/25/24  18:12 EDT      Shared decision making:  Nasir Moreno is a 83-year-old white female with history of atrial fibrillation with tachybradycardia syndrome status post pacemaker placed in the past.  Patient has been on anticoagulation medical therapy and was stable until recently when she is having some falls.  She presented to the hospital this time after a fall and had facial injuries with contusions.  Patient's chads Vascor is high at 6 and that has bled score is 3.  Discussed with patient about Watchman procedure because of her falls history and her requirement of anticoagulation for atrial fibrillation.

## 2024-03-25 NOTE — CASE MANAGEMENT/SOCIAL WORK
Continued Stay Note   [facility]     Patient Name: Catalina Moreno  MRN: [id number]  Today's Date: 3/25/2024    Admit Date: 3/21/2024    Plan: DC Plan: [facility] accepted.Potential bed available tomorrow. No precert required. PASRR approved. EMS vs WC van for transport.   Discharge Plan       Row Name 03/25/24 1409       Plan    Plan DC Plan: [facility] accepted.Potential bed available tomorrow. No precert required. PASRR approved. EMS vs WC van for transport.    Patient/Family in Agreement with Plan yes    Provided Post Acute Provider List? N/A    Provided Post Acute Provider Quality & Resource List? N/A    Plan Comments CM reviewed chart documentation to obtain clinical updates.  Patient now on Amiodarone gtt for atrial fibrillation. CM updated liaison at  of change in condition. CM will continue to follow for any further needs and adjust discharge plan accordingly. DC Barriers: Cardiac monitoring, Amiodarone gtt, and monitor labs.                Expected Discharge Date and Time       Expected Discharge Date Expected Discharge Time    Mar 26, 2024           Phone communication or documentation only- no physical contact with patient or family.      Aidee Pierre RN     Office Phone: [phone]  Office Cell:     [phone]

## 2024-03-25 NOTE — PLAN OF CARE
"Goal Outcome Evaluation:                 Assessment: Catalina Moreno presents with ADL impairments affecting function including balance, endurance / activity tolerance, shortness of breath, and strength. Demonstrated functioning below baseline abilities indicate the need for continued skilled intervention while inpatient. Tolerating session today without incident. Will continue to follow and progress as tolerated.     Plan/Recommendations:   Moderate Intensity Therapy recommended post-acute care. This is recommended as therapy feels the patient would require 3-4 days per week and wouldn't tolerate \"3 hour daily\" rehab intensity. SNF would be the preferred choice. If the patient does not agree to SNF, arrange HH or OP depending on home bound status. If patient is medically complex, consider LTACH.. Pt requires no DME at discharge.                              "
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
## 2024-03-25 NOTE — THERAPY TREATMENT NOTE
"Subjective: Pt agreeable to therapeutic plan of care.    Objective:     Bed mobility - Min-A  Transfers - Min-A and with rolling walker  Ambulation - 2x40 feet CGA and with rolling walker      Vitals: WNL    Pain: 0 VAS   Location: NA  Intervention for pain: N/A    Education: Provided education on the importance of mobility in the acute care setting, Transfer Training, and Gait Training    Assessment: Catalina Moreno presents with functional mobility impairments which indicate the need for skilled intervention. Pt tolerated treatment well this date, ambulating with MIN A with assistance for Rwx management and cueing for safety.Tolerating session today without incident. Will continue to follow and progress as tolerated.     Plan/Recommendations:   If medically appropriate, Moderate Intensity Therapy recommended post-acute care. This is recommended as therapy feels the patient would require 3-4 days per week and wouldn't tolerate \"3 hour daily\" rehab intensity. SNF would be the preferred choice. If the patient does not agree to SNF, arrange HH or OP depending on home bound status. If patient is medically complex, consider LTACH. Pt requires no DME at discharge.     Pt desires Skilled Rehab placement at discharge. Pt cooperative; agreeable to therapeutic recommendations and plan of care.         Basic Mobility 6-click:  Rollin = Total, A lot = 2, A little = 3; 4 = None  Supine>Sit:   1 = Total, A lot = 2, A little = 3; 4 = None   Sit>Stand with arms:  1 = Total, A lot = 2, A little = 3; 4 = None  Bed>Chair:   1 = Total, A lot = 2, A little = 3; 4 = None  Ambulate in room:  1 = Total, A lot = 2, A little = 3; 4 = None  3-5 Steps with railin = Total, A lot = 2, A little = 3; 4 = None  Score: 16    Modified Derek: N/A = No pre-op stroke/TIA    Post-Tx Position: Up in Chair, Alarms activated, and Call light and personal items within reach  PPE: gloves   "

## 2024-03-26 ENCOUNTER — READMISSION MANAGEMENT (OUTPATIENT)
Dept: CALL CENTER | Facility: HOSPITAL | Age: 84
End: 2024-03-26
Payer: MEDICARE

## 2024-03-26 VITALS
BODY MASS INDEX: 29.88 KG/M2 | DIASTOLIC BLOOD PRESSURE: 53 MMHG | SYSTOLIC BLOOD PRESSURE: 110 MMHG | HEART RATE: 128 BPM | TEMPERATURE: 97.9 F | HEIGHT: 63 IN | WEIGHT: 168.65 LBS | OXYGEN SATURATION: 86 % | RESPIRATION RATE: 24 BRPM

## 2024-03-26 LAB
ANION GAP SERPL CALCULATED.3IONS-SCNC: 7 MMOL/L (ref 5–15)
BUN SERPL-MCNC: 16 MG/DL (ref 8–23)
BUN/CREAT SERPL: 19.3 (ref 7–25)
CALCIUM SPEC-SCNC: 8.8 MG/DL (ref 8.6–10.5)
CHLORIDE SERPL-SCNC: 103 MMOL/L (ref 98–107)
CO2 SERPL-SCNC: 31 MMOL/L (ref 22–29)
CREAT SERPL-MCNC: 0.83 MG/DL (ref 0.57–1)
EGFRCR SERPLBLD CKD-EPI 2021: 70.5 ML/MIN/1.73
GLUCOSE SERPL-MCNC: 119 MG/DL (ref 65–99)
POTASSIUM SERPL-SCNC: 4.7 MMOL/L (ref 3.5–5.2)
QT INTERVAL: 312 MS
QTC INTERVAL: 383 MS
QTC INTERVAL: NORMAL MS
SODIUM SERPL-SCNC: 141 MMOL/L (ref 136–145)

## 2024-03-26 PROCEDURE — 97530 THERAPEUTIC ACTIVITIES: CPT

## 2024-03-26 PROCEDURE — 97116 GAIT TRAINING THERAPY: CPT

## 2024-03-26 PROCEDURE — 80048 BASIC METABOLIC PNL TOTAL CA: CPT | Performed by: INTERNAL MEDICINE

## 2024-03-26 PROCEDURE — 99232 SBSQ HOSP IP/OBS MODERATE 35: CPT | Performed by: INTERNAL MEDICINE

## 2024-03-26 RX ORDER — DOCUSATE SODIUM 100 MG/1
100 CAPSULE, LIQUID FILLED ORAL 2 TIMES DAILY
Status: DISCONTINUED | OUTPATIENT
Start: 2024-03-26 | End: 2024-03-26 | Stop reason: HOSPADM

## 2024-03-26 RX ORDER — POLYETHYLENE GLYCOL 3350 17 G/17G
17 POWDER, FOR SOLUTION ORAL DAILY
Status: DISCONTINUED | OUTPATIENT
Start: 2024-03-26 | End: 2024-03-26 | Stop reason: HOSPADM

## 2024-03-26 RX ORDER — AMIODARONE HYDROCHLORIDE 200 MG/1
200 TABLET ORAL 2 TIMES DAILY
Qty: 60 TABLET | Refills: 12 | Status: SHIPPED | OUTPATIENT
Start: 2024-03-26 | End: 2024-03-26

## 2024-03-26 RX ORDER — POLYETHYLENE GLYCOL 3350 17 G/17G
17 POWDER, FOR SOLUTION ORAL DAILY
Qty: 14 PACKET | Refills: 0 | Status: SHIPPED | OUTPATIENT
Start: 2024-03-27 | End: 2024-04-10

## 2024-03-26 RX ORDER — AMIODARONE HYDROCHLORIDE 200 MG/1
200 TABLET ORAL 2 TIMES DAILY
Qty: 60 TABLET | Refills: 12 | Status: SHIPPED | OUTPATIENT
Start: 2024-03-26 | End: 2025-03-31

## 2024-03-26 RX ADMIN — METOPROLOL SUCCINATE 25 MG: 25 TABLET, FILM COATED, EXTENDED RELEASE ORAL at 09:30

## 2024-03-26 RX ADMIN — AMIODARONE HYDROCHLORIDE 200 MG: 200 TABLET ORAL at 09:29

## 2024-03-26 RX ADMIN — APIXABAN 5 MG: 5 TABLET, FILM COATED ORAL at 09:30

## 2024-03-26 RX ADMIN — ATORVASTATIN CALCIUM 10 MG: 10 TABLET, FILM COATED ORAL at 09:30

## 2024-03-26 RX ADMIN — SACUBITRIL AND VALSARTAN 1 TABLET: 24; 26 TABLET, FILM COATED ORAL at 09:30

## 2024-03-26 RX ADMIN — DOCUSATE SODIUM AND SENNOSIDES 1 TABLET: 8.6; 5 TABLET, FILM COATED ORAL at 09:29

## 2024-03-26 RX ADMIN — HYDROCODONE BITARTRATE AND ACETAMINOPHEN 1 TABLET: 7.5; 325 TABLET ORAL at 12:59

## 2024-03-26 RX ADMIN — POLYETHYLENE GLYCOL 3350 17 G: 17 POWDER, FOR SOLUTION ORAL at 14:21

## 2024-03-26 RX ADMIN — MUPIROCIN 1 APPLICATION: 20 OINTMENT TOPICAL at 09:43

## 2024-03-26 NOTE — PROGRESS NOTES
"CARDIOLOGY PROGRESS NOTE:    Catalina Moreno  83 y.o.  female  1940  [id number]      Referring Provider: Hospitalist    Reason for follow-up: Atrial fibrillation     Patient Care Team:  Flash Gonzalez MD as Consulting Physician (Cardiology)    Subjective .  Patient is doing well without any symptoms    Objective lying in bed comfortably     Review of Systems   Constitutional: Negative for malaise/fatigue.   Cardiovascular:  Negative for chest pain, dyspnea on exertion, leg swelling and palpitations.   Respiratory:  Negative for cough and shortness of breath.    Gastrointestinal:  Negative for abdominal pain, nausea and vomiting.   Neurological:  Negative for dizziness, focal weakness, headaches, light-headedness and numbness.   All other systems reviewed and are negative.      Allergies: Baclofen, Codeine, Ibuprofen, Methocarbamol, Naproxen, Tizanidine hcl, and Tolmetin    Scheduled Meds:amiodarone, 200 mg, Oral, BID  apixaban, 5 mg, Oral, Q12H  atorvastatin, 10 mg, Oral, Daily  Lidocaine, 2 patch, Transdermal, Q24H  metoprolol succinate XL, 25 mg, Oral, Q12H  mupirocin, 1 Application, Topical, Q12H  rOPINIRole, 0.25 mg, Oral, Nightly  sacubitril-valsartan, 1 tablet, Oral, BID  sennosides-docusate, 1 tablet, Oral, Daily      Continuous Infusions:   PRN Meds:.  HYDROcodone-acetaminophen    influenza vaccine    melatonin    ondansetron    [COMPLETED] Insert Peripheral IV **AND** sodium chloride        VITAL SIGNS  Vitals:    03/26/24 0600 03/26/24 0819 03/26/24 0929 03/26/24 1103   BP: 122/66 121/63 119/63 124/68   BP Location:  Left arm  Left arm   Patient Position:  Lying  Lying   Pulse: 91  86    Resp:  11  24   Temp:  97.2 °F (36.2 °C)  97.9 °F (36.6 °C)   TempSrc:  Oral  Oral   SpO2: 96%      Weight:       Height:           Flowsheet Rows      Flowsheet Row First Filed Value   Admission Height 160 cm (63\") Documented at 03/21/2024 1537   Admission Weight 69.4 kg (153 lb) Documented at 03/21/2024 1537      "        TELEMETRY: Atrial fibrillation with controlled ventricular response    Physical Exam:  Constitutional:       Appearance: Well-developed.   Eyes:      General: No scleral icterus.     Conjunctiva/sclera: Conjunctivae normal.   HENT:      Head: Normocephalic and atraumatic.   Neck:      Vascular: No carotid bruit or JVD.   Pulmonary:      Effort: Pulmonary effort is normal.      Breath sounds: Normal breath sounds. No wheezing. No rales.   Cardiovascular:      Normal rate. Irregularly irregular rhythm.   Pulses:     Intact distal pulses.   Abdominal:      General: Bowel sounds are normal.      Palpations: Abdomen is soft.   Musculoskeletal:      Cervical back: Normal range of motion and neck supple. Skin:     General: Skin is warm and dry.      Findings: No rash.   Neurological:      Mental Status: Alert.          Results Review:   I reviewed the patient's new clinical results.  Lab Results (last 24 hours)       ** No results found for the last 24 hours. **            Imaging Results (Last 24 Hours)       ** No results found for the last 24 hours. **            EKG      I personally viewed and interpreted the patient's EKG/Telemetry data:    ECHOCARDIOGRAM:  Results for orders placed during the hospital encounter of 09/05/23    Adult Transthoracic Echo Complete W/ Cont if Necessary Per Protocol    Interpretation Summary    Left ventricular ejection fraction appears to be 51 - 55%.    The right ventricular cavity is moderately dilated.    The left atrial cavity is moderately dilated.    The right atrial cavity is mildly  dilated.    There is calcification of the aortic valve.    Moderate aortic valve regurgitation is present.    Estimated right ventricular systolic pressure from tricuspid regurgitation is mildly elevated (35-45 mmHg).       STRESS MYOVIEW:  Results for orders placed during the hospital encounter of 03/31/21    Stress Test With Myocardial Perfusion One Day    Interpretation Summary  · Findings  consistent with a normal ECG stress test.  · Left ventricular ejection fraction is hyperdynamic (Calculated EF > 70%). .  · Impressions are consistent with a study indicating uncertain risk.  · Large apical defect with some reperfusion during the rest images cannot rule out ischemia EF 76% Clinical correlation is recommended.    Electronically signed by KALYANI Tay, 04/01/21, 11:25 AM EDT.       CARDIAC CATHETERIZATION:  Results for orders placed during the hospital encounter of 03/31/21    Cardiac Catheterization/Vascular Study    Narrative  CARDIAC CATHETERIZATION REPORT    DATE OF PROCEDURE: 4/2/2021      INDICATION FOR PROCEDURE: Abnormal stress test    PROCEDURE PERFORMED: Left heart catheterization, coronary angiography,    PROCEDURE COMMENTS:    All the risks and benefits explained with the patient informed consent was obtained from the patient.  Patient was brought to the cardiac catheterization laboratory placed on the table draped and prepped in the usual sterile fashion.  2% lidocaine was used to anesthetize the right groin area.  Using the modified Seldinger technique 5 Luxembourger sheath was inserted into the right femoral artery.    5 Luxembourger JL4 catheter was used to perform the selective left coronary angiography    5 Luxembourger JR4 catheter was used to perform the selective right coronary angiography    Angio-Seal was placed after exchanging five Luxembourger sheath with six Luxembourger sheath    Patient tolerated procedure well without any immediate complications      FINDINGS:    1. HEMODYNAMICS:  LV end-diastolic pressure 7 mmHg, /80 mmHg.    2. LEFT VENTRICULOGRAPHY: Not done patient had echocardiogram    3. CORONARY ANGIOGRAPHY:  Dominance right  Left Main: Large-caliber vessel bifurcates into LAD circumflex artery 0% stenosis  LAD: Large-caliber vessel gives rise to couple of medium caliber diagonal arteries multiple septal branches reaches the apex mild luminal irregularities noted less than 10%  stenosis  CIRC: Large-caliber vessel gives rise to marginal lateral branches less than 10% stenosis  RCA: Large-caliber dominant artery gives rise to PDA and PL branches less than 5 to 10% stenosis    SUMMARY: No obstructive coronary artery disease    RECOMMENDATIONS: Evaluate for noncardiac causes of symptoms aggressive cardiac risk factor modification       OTHER:         Assessment & Plan     Atrial fibrillation  Patient presented with atrial fibrillation with rapid response and was started on IV amiodarone and switch to oral amiodarone  Patient is also on beta-blockers and Eliquis for anticoagulation  Patient's heart rate is better  Patient is ruled out for MI by EKG and enzymes  Patient also has history of falls and hence will discuss with patient about Watchman procedure.  Her [id number] score is high but her has bled score is also high at this time.  Patient will be discharged to home but be followed as an outpatient      Sick sinus syndrome  Patient is status post permanent pacemaker placement and the pacemaker is working very well.  Patient had some unusual spikes but his pacemaker was checked which is working very well and no need to change anything at this time.    Coronary artery disease  Patient has very mild coronary disease and does not have any angina at this time.    Valvular heart disease  Patient has moderate aortic valve regurgitation with normal LV function is currently stable on medical therapy    Hypertension  Patient is currently stable on metoprolol and Entresto and is stable.    HFrEF  Patient has history of congestive heart failure with LV systolic dysfunction but with medical therapy with metoprolol and Entresto her blood pressure has been stable and her LV function has improved to 50 to 55%.  Continue current medications only.    Hyperlipidemia  Patient is currently on statins.  I discussed the patients findings and my recommendations with patient and nurse    Flash Gonzalez,  MD  03/26/24  12:41 EDT

## 2024-03-26 NOTE — OUTREACH NOTE
Prep Survey      Flowsheet Row Responses   [facility]? [facility]   Is LACE score < 7 ? No   Eligibility Not Eligible   What are the reasons patient is not eligible? [facility]  [facility]   Does the patient have one of the following disease processes/diagnoses(primary or secondary)? Other   Prep survey completed? Yes            Maia JIMENEZ - Registered Nurse

## 2024-03-26 NOTE — CASE MANAGEMENT/SOCIAL WORK
Continued Stay Note   [facility]     Patient Name: Catalina Moreno  MRN: [id number]  Today's Date: 3/26/2024    Admit Date: 3/21/2024    Plan: DC Plan: [facility] accepted. Bed ready. No precert required. PASRR approved. EMS vs WC van for transport.   Discharge Plan       Row Name 03/26/24 1431       Plan    Plan DC Plan: [facility] accepted. Bed ready. No precert required. PASRR approved. EMS vs WC van for transport.    Provided Post Acute Provider List? N/A    Provided Post Acute Provider Quality & Resource List? N/A    Plan Comments CM spoke with patient’s nurse and also reviewed chart documentation to obtain clinical updates. DC orders in place. CM updated pharmacy in [facility]. CM confirmed with Tawanna that bed is ready at [facility] today. Patient unable to secure a ride with her friend and has no other options. CM placed request for WC van transport to facility with  time first available. CM informed nursing of trnasport plan. No barriers.               Expected Discharge Date and Time       Expected Discharge Date Expected Discharge Time    Mar 26, 2024           Met with patient in room   Maintain distance greater than six feet and spent less than fifteen minutes in the room.      Aidee Pierre RN     Office Phone: [phone]  Office Cell:     [phone]

## 2024-03-26 NOTE — PROGRESS NOTES
"[facility] MEDICINE SERVICE  DAILY PROGRESS NOTE      Patient Name: Catalina Moreno  Date of Admission: 3/21/2024  Today's Date: 03/26/24  Length of Stay: 4  Primary Care Physician: No primary care provider on file.    Subjective   Patient is stable today.  She is having some nausea today.  She is very concerned about her dog, who has not had any care since she has been here.  No other complaints.  No overnight events.      Objective    Temp:  [97.2 °F (36.2 °C)-98.1 °F (36.7 °C)] 97.2 °F (36.2 °C)  Heart Rate:  [] 86  Resp:  [11-28] 11  BP: ()/(44-95) 119/63  Physical Exam  General: NAD, AAOx3  HEENT: AT NC, EOMI, bilateral periorbital bruising (improving)  Neck: No JVD  CVS: S1/S2 present, irregular rhythm, no M/R/G  Lungs: CTA B/L  Abdomen: soft, NT, ND, BS+  Ext: no edema  Skin: periorbital bruising  Neuro: no focal deficits  Psych: Not agitated         Results Review:  I have reviewed the labs, radiology results, and diagnostic studies.    Laboratory Data:   Results from last 7 days   Lab Units 03/21/24  1732   WBC 10*3/mm3 10.43   HEMOGLOBIN g/dL 11.8*   HEMATOCRIT % 38.2   PLATELETS 10*3/mm3 276        Results from last 7 days   Lab Units 03/25/24  0508 03/24/24  1244 03/21/24  1732   SODIUM mmol/L 138 139 141   POTASSIUM mmol/L 4.2 4.1 4.3   CHLORIDE mmol/L 100 101 102   CO2 mmol/L 29.0 29.0 24.0   BUN mg/dL 15 14 18   CREATININE mg/dL 0.85 0.83 0.88   CALCIUM mg/dL 8.8 9.0 9.6   BILIRUBIN mg/dL  --   --  1.2   ALK PHOS U/L  --   --  90   ALT (SGPT) U/L  --   --  7   AST (SGOT) U/L  --   --  16   GLUCOSE mg/dL 117* 108* 109*       Culture Data:   No results found for: \"BLOODCX\"  No results found for: \"URINECX\"  No results found for: \"RESPCX\"  No results found for: \"WOUNDCX\"  No results found for: \"STOOLCX\"  No components found for: \"BODYFLD\"    Radiology Data:   Imaging Results (Last 24 Hours)       ** No results found for the last 24 hours. **            I have reviewed the patient's " current medications.     Assessment/Plan     1) A-fib with RVR  - patient is back in RVR after initial resolution  - cardiology consulted, appreciate recs  - amiodarone drip discontinued  - oral amiodarone  - metoprolol, eliquis  - monitor on telemetry    2) fall  - mechanical  - PT/OT consulted, appreciate recs  - patient will need placement in SNF    3) HTN  - home meds    4) HLD  - lipitor    5) CHF  - systolic  - last TTE shows preserved EF  - home meds    6) CAD  - home meds  - does not appear to be on ASA as an outpatient    7) sick sinus syndrome  - s/p pacemaker placement    8) GI/DVT ppx  - none/eliquis    9) Disposition  - SNF            Electronically signed by Travis Sandoval MD, 03/26/24, 10:30 EDT.

## 2024-03-26 NOTE — PLAN OF CARE
"Goal Outcome Evaluation:  Assessment: Catalina Moreno is a 83 y.o. female with PMH of A-Fib, CKD, CHF who presented to [facility] after a fall on 3/21/24. Pt HR upon entry is 86 bpm. Needed CGA w/ RW for ambulation, HR increasing to 127bpm, no symptoms reported. Pt has forward flexed posture, needing cueing to correct to increase breathing mechanics. Will continue to follow and progress as tolerated.     Plan/Recommendations:   If medically appropriate, Moderate Intensity Therapy recommended post-acute care. This is recommended as therapy feels the patient would require 3-4 days per week and wouldn't tolerate \"3 hour daily\" rehab intensity. SNF would be the preferred choice. If the patient does not agree to SNF, arrange HH or OP depending on home bound status. If patient is medically complex, consider LTACH. Pt requires no DME at discharge.                         "

## 2024-03-26 NOTE — DISCHARGE SUMMARY
[facility] Medicine Services  Discharge Summary      Date of Admission: 3/21/2024  Date of Discharge:  3/26/2024  Primary Care Physician: No primary care provider on file.    Presenting Problem/History of Present Illness:  Atrial fibrillation with RVR [I48.91]  Injury of head, initial encounter [S09.90XA]  Open fracture of nasal bone, initial encounter [S02.2XXB]  Sprain of left ankle, unspecified ligament, initial encounter [S93.402A]       Final Discharge Diagnoses:  Active Hospital Problems    Diagnosis     **Atrial fibrillation with RVR     Fall     Nasal bones, closed fracture     Presence of cardiac pacemaker     Restless legs syndrome     Generalized anxiety disorder     Essential (primary) hypertension     Coronary artery disease involving native coronary artery of native heart without angina pectoris     Stage 3a chronic kidney disease        Consults:   Consults       Date and Time Order Name Status Description    3/21/2024  7:24 PM Inpatient Cardiology Consult Completed             Procedures Performed:                 Pertinent Test Results:   Lab Results (most recent)       Procedure Component Value Units Date/Time    Basic Metabolic Panel [[id number]] Collected: 03/26/24 1327    Specimen: Blood Updated: 03/26/24 1330    Basic Metabolic Panel [[id number]]  (Abnormal) Collected: 03/25/24 0508    Specimen: Blood Updated: 03/25/24 0539     Glucose 117 mg/dL      BUN 15 mg/dL      Creatinine 0.85 mg/dL      Sodium 138 mmol/L      Potassium 4.2 mmol/L      Chloride 100 mmol/L      CO2 29.0 mmol/L      Calcium 8.8 mg/dL      BUN/Creatinine Ratio 17.6     Anion Gap 9.0 mmol/L      eGFR 68.5 mL/min/1.73     Narrative:      GFR Normal >60  Chronic Kidney Disease <60  Kidney Failure <15    The GFR formula is only valid for adults with stable renal function between ages 18 and 70.    Extra Tubes [[id number]] Collected: 03/24/24 1244    Specimen: Blood, Venous Line Updated: 03/24/24 1345    Narrative:       The following orders were created for panel order Extra Tubes.  Procedure                               Abnormality         Status                     ---------                               -----------         ------                     Lavender Top[[id number]]                                     Final result                 Please view results for these tests on the individual orders.    Lavender Top [[id number]] Collected: 03/24/24 1244    Specimen: Blood Updated: 03/24/24 1345     Extra Tube hold for add-on     Comment: Auto resulted       TSH [[id number]]  (Abnormal) Collected: 03/22/24 1202    Specimen: Blood Updated: 03/22/24 1246     TSH 8.900 uIU/mL     High Sensitivity Troponin T 2Hr [[id number]]  (Normal) Collected: 03/21/24 2000    Specimen: Blood Updated: 03/21/24 2034     HS Troponin T 11 ng/L      Troponin T Delta -1 ng/L     Narrative:      High Sensitive Troponin T Reference Range:  <14.0 ng/L- Negative Female for AMI  <22.0 ng/L- Negative Male for AMI  >=14 - Abnormal Female indicating possible myocardial injury.  >=22 - Abnormal Male indicating possible myocardial injury.   Clinicians would have to utilize clinical acumen, EKG, Troponin, and serial changes to determine if it is an Acute Myocardial Infarction or myocardial injury due to an underlying chronic condition.         Magnesium [[id number]]  (Normal) Collected: 03/21/24 1732    Specimen: Blood Updated: 03/21/24 1914     Magnesium 2.0 mg/dL     Comprehensive Metabolic Panel [[id number]]  (Abnormal) Collected: 03/21/24 1732    Specimen: Blood Updated: 03/21/24 1809     Glucose 109 mg/dL      BUN 18 mg/dL      Creatinine 0.88 mg/dL      Sodium 141 mmol/L      Potassium 4.3 mmol/L      Chloride 102 mmol/L      CO2 24.0 mmol/L      Calcium 9.6 mg/dL      Total Protein 7.6 g/dL      Albumin 4.5 g/dL      ALT (SGPT) 7 U/L      AST (SGOT) 16 U/L      Alkaline Phosphatase 90 U/L      Total Bilirubin 1.2 mg/dL      Globulin 3.1 gm/dL      A/G Ratio  1.5 g/dL      BUN/Creatinine Ratio 20.5     Anion Gap 15.0 mmol/L      eGFR 65.7 mL/min/1.73     Narrative:      GFR Normal >60  Chronic Kidney Disease <60  Kidney Failure <15    The GFR formula is only valid for adults with stable renal function between ages 18 and 70.    High Sensitivity Troponin T [[id number]]  (Normal) Collected: 03/21/24 1732    Specimen: Blood Updated: 03/21/24 1809     HS Troponin T 12 ng/L     Narrative:      High Sensitive Troponin T Reference Range:  <14.0 ng/L- Negative Female for AMI  <22.0 ng/L- Negative Male for AMI  >=14 - Abnormal Female indicating possible myocardial injury.  >=22 - Abnormal Male indicating possible myocardial injury.   Clinicians would have to utilize clinical acumen, EKG, Troponin, and serial changes to determine if it is an Acute Myocardial Infarction or myocardial injury due to an underlying chronic condition.         aPTT [[id number]]  (Abnormal) Collected: 03/21/24 1732    Specimen: Blood Updated: 03/21/24 1752     PTT 26.8 seconds     Protime-INR [[id number]]  (Normal) Collected: 03/21/24 1732    Specimen: Blood Updated: 03/21/24 1752     Protime 11.3 Seconds      INR 1.04    CBC & Differential [[id number]]  (Abnormal) Collected: 03/21/24 1732    Specimen: Blood Updated: 03/21/24 1738    Narrative:      The following orders were created for panel order CBC & Differential.  Procedure                               Abnormality         Status                     ---------                               -----------         ------                     CBC Auto Differential[[id number]]        Abnormal            Final result                 Please view results for these tests on the individual orders.    CBC Auto Differential [[id number]]  (Abnormal) Collected: 03/21/24 1732    Specimen: Blood Updated: 03/21/24 1738     WBC 10.43 10*3/mm3      RBC 4.36 10*6/mm3      Hemoglobin 11.8 g/dL      Hematocrit 38.2 %      MCV 87.6 fL      MCH 27.1 pg      MCHC 30.9 g/dL      RDW  15.9 %      RDW-SD 51.1 fl      MPV 10.4 fL      Platelets 276 10*3/mm3      Neutrophil % 75.3 %      Lymphocyte % 16.5 %      Monocyte % 6.1 %      Eosinophil % 0.8 %      Basophil % 0.9 %      Immature Grans % 0.4 %      Neutrophils, Absolute 7.86 10*3/mm3      Lymphocytes, Absolute 1.72 10*3/mm3      Monocytes, Absolute 0.64 10*3/mm3      Eosinophils, Absolute 0.08 10*3/mm3      Basophils, Absolute 0.09 10*3/mm3      Immature Grans, Absolute 0.04 10*3/mm3      nRBC 0.0 /100 WBC           Imaging Results (Most Recent)       Procedure Component Value Units Date/Time    XR Spine Lumbar Complete 4+VW [[id number]] Collected: 03/21/24 1921     Updated: 03/21/24 1929    Narrative:      XR PELVIS 1 OR 2 VW, XR SPINE LUMBAR COMPLETE 4+VW    Date of Exam: 3/21/2024 7:16 PM EDT    Indication: trauma    Comparison: None available.    Findings:  Pelvis:  The hips are maintained in alignment. Iliopectineal and ilioischial lines are intact. No fracture. No dislocation.    Lumbar spine:  There are 5 nonrib-bearing lumbar-type vertebral bodies. Vertebral bodies are maintained in height. Negative for acute fracture. Mild convex right dextrocurvature of the lumbar spine centered at L2. Multilevel disc narrowing in the lumbar spine. Mild   anterolisthesis of L3 on L4 and L4 on L5 similar to prior CT. Multilevel disc narrowing throughout the lumbar spine. Facet arthritis in the lower lumbar spine most pronounced at L4-5 and L5-S1.      Impression:      Impression:  Pelvis:  No acute osseous abnormality.    Lumbar spine:  1. Negative for acute osseous abnormality.  2. Lumbar degenerative findings above.      Electronically Signed: Edil Joshua MD    3/21/2024 7:27 PM EDT    Workstation ID: [id number]    XR Pelvis 1 or 2 View [[id number]] Collected: 03/21/24 1921     Updated: 03/21/24 1929    Narrative:      XR PELVIS 1 OR 2 VW, XR SPINE LUMBAR COMPLETE 4+VW    Date of Exam: 3/21/2024 7:16 PM EDT    Indication: trauma    Comparison: None  available.    Findings:  Pelvis:  The hips are maintained in alignment. Iliopectineal and ilioischial lines are intact. No fracture. No dislocation.    Lumbar spine:  There are 5 nonrib-bearing lumbar-type vertebral bodies. Vertebral bodies are maintained in height. Negative for acute fracture. Mild convex right dextrocurvature of the lumbar spine centered at L2. Multilevel disc narrowing in the lumbar spine. Mild   anterolisthesis of L3 on L4 and L4 on L5 similar to prior CT. Multilevel disc narrowing throughout the lumbar spine. Facet arthritis in the lower lumbar spine most pronounced at L4-5 and L5-S1.      Impression:      Impression:  Pelvis:  No acute osseous abnormality.    Lumbar spine:  1. Negative for acute osseous abnormality.  2. Lumbar degenerative findings above.      Electronically Signed: Edil Joshua MD    3/21/2024 7:27 PM EDT    Workstation ID: [id number]    XR Chest 1 View [[id number]] Collected: 03/21/24 1919     Updated: 03/21/24 1922    Narrative:      XR CHEST 1 VW    Date of Exam: 3/21/2024 7:15 PM EDT    Indication: tachcardia    Comparison: 9/18/2023    Findings:  Left-sided AICD noted. Stable mild cardiomegaly. Lungs are without consolidation. Patient rotated to the right. Negative for pneumothorax. No pleural effusion. Osseous structures appear grossly intact.      Impression:      Impression:  No acute process.      Electronically Signed: Edil Joshua MD    3/21/2024 7:20 PM EDT    Workstation ID: [id number]    XR Ankle 3+ View Left [[id number]] Collected: 03/21/24 1733     Updated: 03/21/24 1736    Narrative:      XR ANKLE 3+ VW LEFT, XR FOOT 3+ VW LEFT    Date of Exam: 3/21/2024 5:09 PM EDT    Indication: trauma    Comparison: None available.    Findings:  No evidence of acute fracture nor dislocation. Alignment is normal. No degenerative arthrosis throughout. Soft tissues are unremarkable.      Impression:      Impression:  No acute osseous injury identified.      Electronically Signed:  Kirill Catalan MD    3/21/2024 5:34 PM EDT    Workstation ID: [id number]    XR Foot 3+ View Left [[id number]] Collected: 03/21/24 1733     Updated: 03/21/24 1736    Narrative:      XR ANKLE 3+ VW LEFT, XR FOOT 3+ VW LEFT    Date of Exam: 3/21/2024 5:09 PM EDT    Indication: trauma    Comparison: None available.    Findings:  No evidence of acute fracture nor dislocation. Alignment is normal. No degenerative arthrosis throughout. Soft tissues are unremarkable.      Impression:      Impression:  No acute osseous injury identified.      Electronically Signed: Kirill Catalan MD    3/21/2024 5:34 PM EDT    Workstation ID: [id number]    CT Head Without Contrast [[id number]] Collected: 03/21/24 1653     Updated: 03/21/24 1704    Narrative:      CT HEAD WO CONTRAST, CT CERVICAL SPINE WO CONTRAST, CT FACIAL BONES WO CONTRAST    Date of Exam: 3/21/2024 4:46 PM EDT    Indication: trauma.    Comparison: Noncontrast head CT dated 7/21/2022    Technique: Axial CT images were obtained of the head without contrast administration.  Coronal reconstructions were performed.  Automated exposure control and iterative reconstruction methods were used.        CT HEAD WITHOUT IV CONTRAST    FINDINGS:    Foci of periventricular and subcortical white matter hypoattenuation are consistent with chronic, microvascular ischemic change. There is age-related loss of brain parenchymal volume with prominence of sulcation and ventriculomegaly. No significant mass   effect, midline shift, intracranial hemorrhage, or hydrocephalus is identified. No extra-axial fluid collection is identified.         Impression:      1.No acute intracranial abnormality is identified.  2.Findings compatible with chronic microvascular ischemic change and diffuse cortical atrophy.    -------    CT FACE WITHOUT IV CONTRAST    FINDINGS:  There are nondisplaced fractures of the bilateral nasal bones as well as the anterior bony nasal septum. There is soft tissue swelling across the  nasal bridge. Bony orbits, globes, retrobulbar soft tissues, and optic nerves appear unremarkable   bilaterally. Paranasal sinuses and mastoid air cells appear well aerated without air-fluid levels identified. The pterygoid plates and zygomatic arches are intact. Superficial soft tissues are without significant abnormality. Limited visualization of the   intracranial contents is unremarkable.    IMPRESSION:  Nondisplaced fractures of the bilateral nasal bones as well as the anterior bony nasal septum.    -------    CT CERVICAL SPINE WITHOUT IV CONTRAST    FINDINGS:  No acute fracture is identified. There is moderate cervical spondylosis, and there is grade 1 anterolisthesis at C3-C4 and C7-T1. Vertebral body heights are maintained. The craniocervical and atlantoaxial junctions appear within normal limits. The   prevertebral and paraspinal soft tissues are without focal abnormality. Left chest wall pacemaker is partially imaged.    The visualized lung apices are clear.    IMPRESSION:  1.No acute fracture is identified within the cervical spine.  2.Moderate cervical spondylosis with grade 1 anterolisthesis at C3-C4 and C7-T1.          Electronically Signed: Jimmy Collins MD    3/21/2024 5:02 PM EDT    Workstation ID: [id number]    CT Cervical Spine Without Contrast [[id number]] Collected: 03/21/24 1653     Updated: 03/21/24 1704    Narrative:      CT HEAD WO CONTRAST, CT CERVICAL SPINE WO CONTRAST, CT FACIAL BONES WO CONTRAST    Date of Exam: 3/21/2024 4:46 PM EDT    Indication: trauma.    Comparison: Noncontrast head CT dated 7/21/2022    Technique: Axial CT images were obtained of the head without contrast administration.  Coronal reconstructions were performed.  Automated exposure control and iterative reconstruction methods were used.        CT HEAD WITHOUT IV CONTRAST    FINDINGS:    Foci of periventricular and subcortical white matter hypoattenuation are consistent with chronic, microvascular ischemic change.  There is age-related loss of brain parenchymal volume with prominence of sulcation and ventriculomegaly. No significant mass   effect, midline shift, intracranial hemorrhage, or hydrocephalus is identified. No extra-axial fluid collection is identified.         Impression:      1.No acute intracranial abnormality is identified.  2.Findings compatible with chronic microvascular ischemic change and diffuse cortical atrophy.    -------    CT FACE WITHOUT IV CONTRAST    FINDINGS:  There are nondisplaced fractures of the bilateral nasal bones as well as the anterior bony nasal septum. There is soft tissue swelling across the nasal bridge. Bony orbits, globes, retrobulbar soft tissues, and optic nerves appear unremarkable   bilaterally. Paranasal sinuses and mastoid air cells appear well aerated without air-fluid levels identified. The pterygoid plates and zygomatic arches are intact. Superficial soft tissues are without significant abnormality. Limited visualization of the   intracranial contents is unremarkable.    IMPRESSION:  Nondisplaced fractures of the bilateral nasal bones as well as the anterior bony nasal septum.    -------    CT CERVICAL SPINE WITHOUT IV CONTRAST    FINDINGS:  No acute fracture is identified. There is moderate cervical spondylosis, and there is grade 1 anterolisthesis at C3-C4 and C7-T1. Vertebral body heights are maintained. The craniocervical and atlantoaxial junctions appear within normal limits. The   prevertebral and paraspinal soft tissues are without focal abnormality. Left chest wall pacemaker is partially imaged.    The visualized lung apices are clear.    IMPRESSION:  1.No acute fracture is identified within the cervical spine.  2.Moderate cervical spondylosis with grade 1 anterolisthesis at C3-C4 and C7-T1.          Electronically Signed: Jimmy Collins MD    3/21/2024 5:02 PM EDT    Workstation ID: [id number]    CT Facial Bones Without Contrast [[id number]] Collected: 03/21/24 1669      Updated: 03/21/24 1704    Narrative:      CT HEAD WO CONTRAST, CT CERVICAL SPINE WO CONTRAST, CT FACIAL BONES WO CONTRAST    Date of Exam: 3/21/2024 4:46 PM EDT    Indication: trauma.    Comparison: Noncontrast head CT dated 7/21/2022    Technique: Axial CT images were obtained of the head without contrast administration.  Coronal reconstructions were performed.  Automated exposure control and iterative reconstruction methods were used.        CT HEAD WITHOUT IV CONTRAST    FINDINGS:    Foci of periventricular and subcortical white matter hypoattenuation are consistent with chronic, microvascular ischemic change. There is age-related loss of brain parenchymal volume with prominence of sulcation and ventriculomegaly. No significant mass   effect, midline shift, intracranial hemorrhage, or hydrocephalus is identified. No extra-axial fluid collection is identified.         Impression:      1.No acute intracranial abnormality is identified.  2.Findings compatible with chronic microvascular ischemic change and diffuse cortical atrophy.    -------    CT FACE WITHOUT IV CONTRAST    FINDINGS:  There are nondisplaced fractures of the bilateral nasal bones as well as the anterior bony nasal septum. There is soft tissue swelling across the nasal bridge. Bony orbits, globes, retrobulbar soft tissues, and optic nerves appear unremarkable   bilaterally. Paranasal sinuses and mastoid air cells appear well aerated without air-fluid levels identified. The pterygoid plates and zygomatic arches are intact. Superficial soft tissues are without significant abnormality. Limited visualization of the   intracranial contents is unremarkable.    IMPRESSION:  Nondisplaced fractures of the bilateral nasal bones as well as the anterior bony nasal septum.    -------    CT CERVICAL SPINE WITHOUT IV CONTRAST    FINDINGS:  No acute fracture is identified. There is moderate cervical spondylosis, and there is grade 1 anterolisthesis at C3-C4 and  C7-T1. Vertebral body heights are maintained. The craniocervical and atlantoaxial junctions appear within normal limits. The   prevertebral and paraspinal soft tissues are without focal abnormality. Left chest wall pacemaker is partially imaged.    The visualized lung apices are clear.    IMPRESSION:  1.No acute fracture is identified within the cervical spine.  2.Moderate cervical spondylosis with grade 1 anterolisthesis at C3-C4 and C7-T1.          Electronically Signed: Jimmy Collins MD    3/21/2024 5:02 PM EDT    Workstation ID: [id number]            Chief Complaint on Day of Discharge: fall    Hospital Course:  Catalina Moreno is a  very pleasant 83 y.o. female with a CMH of paroxysmal atrial fibrillation on Eliquis, chronic kidney disease, anxiety depression, chronic heart failure last echo dated 9/7/2023 ejection fraction 51 to 55%, degenerative disc disease, GERD, hypertension, hyperlipidemia, who presented to [facility] on 3/21/2024 with complaints of left ankle pain and headache after a fall walking on concrete .  She is awake and alert very good historian.  She denies any precipitating factors to the fall she reports she just tripped.  She denies any chest pain, shortness of air dizziness syncope or palpitations either before or after the fall.  She is breathing comfortably on room air.  She denies any current headache.  She reports she is usually quite independent and is very careful walking.     EKG showed atrial fibrillation with RVR, sensitivity troponin 12 chest x-ray showed no acute process per radiology, x-ray of pelvis per radiology negative for acute osseous abnormality, x-ray lumbar spine negative for acute osseous abnormality CT cervical spine per radiology no acute fracture moderate cervical spondylosis C3-C4 C7-T1, CT head per radiology no acute intracranial abnormality chronic microvascular ischemic changes and diffuse cortical atrophy, CT facial bones per radiology did show  "nondisplaced fractures of the bilateral nasal bones as well as the anterior bony nasal septum.     She was given a tetanus shot in the emergency department and started on IV Cardizem drip with improvement in rate.  Admitted for further evaluation and treatment cardiology has been consulted.  Nasal bridge laceration was sutured in ED and she was given a one-time dose of IV cefazolin.    The patient was evaluated by cardiology and transitioned to an amiodarone drip and then oral amiodarone.  She was evaluated by PT/OT, who recommended she go to SNF.  She was deemed stable for d/c home on 3/26.  She is to f/u with her PCP in 1 week and cardiology when scheduled.    Condition on Discharge:  stable    Physical Exam on Discharge:  /68 (BP Location: Left arm, Patient Position: Lying)   Pulse 86   Temp 97.9 °F (36.6 °C) (Oral)   Resp 24   Ht 160 cm (63\")   Wt 76.5 kg (168 lb 10.4 oz)   SpO2 96%   BMI 29.88 kg/m²   Physical Exam  General: NAD, AAOx3  HEENT: AT NC, EOMI, bilateral periorbital bruising (improving)  Neck: No JVD  CVS: S1/S2 present, irregular rhythm, no M/R/G  Lungs: CTA B/L  Abdomen: soft, NT, ND, BS+  Ext: no edema  Skin: periorbital bruising  Neuro: no focal deficits  Psych: Not agitated          Discharge Disposition:  Home or Self Care    Discharge Medications:     Discharge Medications        New Medications        Instructions Start Date   amiodarone 200 MG tablet  Commonly known as: PACERONE   200 mg, Oral, 2 Times Daily             Continue These Medications        Instructions Start Date   apixaban 5 MG tablet tablet  Commonly known as: ELIQUIS   5 mg, Oral, Every 12 Hours Scheduled      atorvastatin 10 MG tablet  Commonly known as: LIPITOR   10 mg, Oral, Daily      dilTIAZem  MG 24 hr capsule  Commonly known as: CARDIZEM CD   240 mg, Oral, Every 24 Hours Scheduled      Entresto 24-26 MG tablet  Generic drug: sacubitril-valsartan   1 tablet, Oral, 2 Times Daily      lidocaine 5 " %  Commonly known as: LIDODERM   2 patches, Transdermal, Every 24 Hours Scheduled, Remove & Discard patch within 12 hours or as directed by MD      metoprolol succinate XL 25 MG 24 hr tablet  Commonly known as: TOPROL-XL   25 mg, Oral, Daily      rOPINIRole 0.25 MG tablet  Commonly known as: REQUIP   0.25 mg, Oral, Nightly, Take 1 hour before bedtime.      sennosides-docusate 8.6-50 MG per tablet  Commonly known as: PERICOLACE   1 tablet, Oral, Daily               Discharge Diet:  heart healthy    Activity at Discharge:  as tolerated    Discharge Care Plan/Instructions: f/u PCP, cardiology    Time: less than 30 minutes

## 2024-03-26 NOTE — NURSING NOTE
[employer] was called to schedule an interrogation of the patient pacemaker today.

## 2024-03-27 ENCOUNTER — TELEPHONE (OUTPATIENT)
Dept: CARDIOLOGY | Facility: CLINIC | Age: 84
End: 2024-03-27
Payer: MEDICARE

## 2024-03-27 NOTE — TELEPHONE ENCOUNTER
Caller: ALLAN BRINK    Relationship to patient:     Best call back number: [phone]    Chief complaint: HOSP    Type of visit: HOSP FOLLOW UP    Requested date: 2 WEEKS

## 2024-03-28 NOTE — CASE MANAGEMENT/SOCIAL WORK
Case Management Discharge Note      Selected Continued Care - Discharged on 3/26/2024 Admission date: 3/21/2024 - Discharge disposition: Home or Self Care      Destination Coordination complete.      Service Provider Selected Services Address Phone Fax Patient Preferred    [facility] [address] [place] [address] [place] [address] [phone] --               Transportation Services  W/C Van: Juan Erickson    Final Discharge Disposition Code: 03 - skilled nursing facility (SNF)

## 2024-04-09 NOTE — ED NOTES
Pt reports going to grocery yesterday and then feeling ill late last night. Pt c/o back pain and generalized weakness/malaise.     Prior, BRENDAN Howell  03/04/22 [id number]     Ambulatory

## 2024-07-01 RX ORDER — GABAPENTIN 600 MG/1
TABLET ORAL
Qty: 120 TABLET | Refills: 11 | OUTPATIENT
Start: 2024-07-01

## 2024-07-05 ENCOUNTER — APPOINTMENT (OUTPATIENT)
Dept: GENERAL RADIOLOGY | Facility: HOSPITAL | Age: 84
End: 2024-07-05
Payer: MEDICARE

## 2024-07-05 ENCOUNTER — HOSPITAL ENCOUNTER (INPATIENT)
Facility: HOSPITAL | Age: 84
LOS: 6 days | Discharge: HOME HEALTH | End: 2024-07-11
Attending: FAMILY MEDICINE
Payer: MEDICARE

## 2024-07-05 DIAGNOSIS — R53.1 WEAKNESS: ICD-10-CM

## 2024-07-05 DIAGNOSIS — M53.3 SACRAL BACK PAIN: ICD-10-CM
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
## 2024-07-05 DIAGNOSIS — I48.20 CHRONIC ATRIAL FIBRILLATION: ICD-10-CM

## 2024-07-05 DIAGNOSIS — M25.511 RIGHT SHOULDER PAIN, UNSPECIFIED CHRONICITY: ICD-10-CM

## 2024-07-05 DIAGNOSIS — D50.9 IRON DEFICIENCY ANEMIA, UNSPECIFIED IRON DEFICIENCY ANEMIA TYPE: ICD-10-CM

## 2024-07-05 DIAGNOSIS — I48.91 ATRIAL FIBRILLATION WITH RVR: ICD-10-CM

## 2024-07-05 DIAGNOSIS — I50.32 CHRONIC HEART FAILURE WITH PRESERVED EJECTION FRACTION (HFPEF): ICD-10-CM

## 2024-07-05 DIAGNOSIS — N17.9 AKI (ACUTE KIDNEY INJURY): ICD-10-CM

## 2024-07-05 LAB
ALBUMIN SERPL-MCNC: 4.5 G/DL (ref 3.5–5.2)
ALBUMIN/GLOB SERPL: 1.6 G/DL
ALP SERPL-CCNC: 90 U/L (ref 39–117)
ALT SERPL W P-5'-P-CCNC: 9 U/L (ref 1–33)
ANION GAP SERPL CALCULATED.3IONS-SCNC: 14.5 MMOL/L (ref 5–15)
AST SERPL-CCNC: 24 U/L (ref 1–32)
BASOPHILS # BLD AUTO: 0.09 10*3/MM3 (ref 0–0.2)
BASOPHILS NFR BLD AUTO: 1.3 % (ref 0–1.5)
BILIRUB SERPL-MCNC: 1.2 MG/DL (ref 0–1.2)
BUN SERPL-MCNC: 26 MG/DL (ref 8–23)
BUN/CREAT SERPL: 22.6 (ref 7–25)
CALCIUM SPEC-SCNC: 9.5 MG/DL (ref 8.6–10.5)
CHLORIDE SERPL-SCNC: 103 MMOL/L (ref 98–107)
CO2 SERPL-SCNC: 19.5 MMOL/L (ref 22–29)
CREAT SERPL-MCNC: 1.15 MG/DL (ref 0.57–1)
D DIMER PPP FEU-MCNC: 0.73 MG/L (FEU) (ref 0–0.83)
DEPRECATED RDW RBC AUTO: 48.7 FL (ref 37–54)
EGFRCR SERPLBLD CKD-EPI 2021: 47.4 ML/MIN/1.73
EOSINOPHIL # BLD AUTO: 0.13 10*3/MM3 (ref 0–0.4)
EOSINOPHIL NFR BLD AUTO: 1.9 % (ref 0.3–6.2)
ERYTHROCYTE [DISTWIDTH] IN BLOOD BY AUTOMATED COUNT: 17.3 % (ref 12.3–15.4)
GEN 5 2HR TROPONIN T REFLEX: 17 NG/L
GLOBULIN UR ELPH-MCNC: 2.9 GM/DL
GLUCOSE SERPL-MCNC: 106 MG/DL (ref 65–99)
HCT VFR BLD AUTO: 29.8 % (ref 34–46.6)
HGB BLD-MCNC: 9.1 G/DL (ref 12–15.9)
HOLD SPECIMEN: NORMAL
IMM GRANULOCYTES # BLD AUTO: 0.02 10*3/MM3 (ref 0–0.05)
IMM GRANULOCYTES NFR BLD AUTO: 0.3 % (ref 0–0.5)
LYMPHOCYTES # BLD AUTO: 1.77 10*3/MM3 (ref 0.7–3.1)
LYMPHOCYTES NFR BLD AUTO: 25.7 % (ref 19.6–45.3)
MAGNESIUM SERPL-MCNC: 2 MG/DL (ref 1.6–2.4)
MCH RBC QN AUTO: 24.1 PG (ref 26.6–33)
MCHC RBC AUTO-ENTMCNC: 30.5 G/DL (ref 31.5–35.7)
MCV RBC AUTO: 78.8 FL (ref 79–97)
MONOCYTES # BLD AUTO: 0.43 10*3/MM3 (ref 0.1–0.9)
MONOCYTES NFR BLD AUTO: 6.2 % (ref 5–12)
NEUTROPHILS NFR BLD AUTO: 4.45 10*3/MM3 (ref 1.7–7)
NEUTROPHILS NFR BLD AUTO: 64.6 % (ref 42.7–76)
NRBC BLD AUTO-RTO: 0 /100 WBC (ref 0–0.2)
NT-PROBNP SERPL-MCNC: ABNORMAL PG/ML (ref 0–1800)
PLATELET # BLD AUTO: 254 10*3/MM3 (ref 140–450)
PMV BLD AUTO: 10.8 FL (ref 6–12)
POTASSIUM SERPL-SCNC: 4.4 MMOL/L (ref 3.5–5.2)
PROT SERPL-MCNC: 7.4 G/DL (ref 6–8.5)
RBC # BLD AUTO: 3.78 10*6/MM3 (ref 3.77–5.28)
SODIUM SERPL-SCNC: 137 MMOL/L (ref 136–145)
TROPONIN T DELTA: -1 NG/L
TROPONIN T SERPL HS-MCNC: 18 NG/L
WBC NRBC COR # BLD AUTO: 6.89 10*3/MM3 (ref 3.4–10.8)
WHOLE BLOOD HOLD COAG: NORMAL
WHOLE BLOOD HOLD SPECIMEN: NORMAL

## 2024-07-05 PROCEDURE — 93005 ELECTROCARDIOGRAM TRACING: CPT

## 2024-07-05 PROCEDURE — 71045 X-RAY EXAM CHEST 1 VIEW: CPT

## 2024-07-05 PROCEDURE — 72110 X-RAY EXAM L-2 SPINE 4/>VWS: CPT

## 2024-07-05 PROCEDURE — 25010000002 HYDROMORPHONE 1 MG/ML SOLUTION

## 2024-07-05 PROCEDURE — 83735 ASSAY OF MAGNESIUM: CPT

## 2024-07-05 PROCEDURE — 85379 FIBRIN DEGRADATION QUANT: CPT

## 2024-07-05 PROCEDURE — 85025 COMPLETE CBC W/AUTO DIFF WBC: CPT

## 2024-07-05 PROCEDURE — 36415 COLL VENOUS BLD VENIPUNCTURE: CPT

## 2024-07-05 PROCEDURE — 73030 X-RAY EXAM OF SHOULDER: CPT

## 2024-07-05 PROCEDURE — 93005 ELECTROCARDIOGRAM TRACING: CPT | Performed by: FAMILY MEDICINE

## 2024-07-05 PROCEDURE — 25010000002 ONDANSETRON PER 1 MG

## 2024-07-05 PROCEDURE — 72220 X-RAY EXAM SACRUM TAILBONE: CPT

## 2024-07-05 PROCEDURE — 84484 ASSAY OF TROPONIN QUANT: CPT

## 2024-07-05 PROCEDURE — 99285 EMERGENCY DEPT VISIT HI MDM: CPT

## 2024-07-05 PROCEDURE — 83880 ASSAY OF NATRIURETIC PEPTIDE: CPT

## 2024-07-05 PROCEDURE — 80053 COMPREHEN METABOLIC PANEL: CPT

## 2024-07-05 RX ORDER — GABAPENTIN 300 MG/1
600 CAPSULE ORAL EVERY 8 HOURS SCHEDULED
Status: DISCONTINUED | OUTPATIENT
Start: 2024-07-05 | End: 2024-07-05

## 2024-07-05 RX ORDER — ACETAMINOPHEN 160 MG/5ML
650 SOLUTION ORAL EVERY 4 HOURS PRN
Status: DISCONTINUED | OUTPATIENT
Start: 2024-07-05 | End: 2024-07-11 | Stop reason: HOSPADM

## 2024-07-05 RX ORDER — SODIUM CHLORIDE 0.9 % (FLUSH) 0.9 %
10 SYRINGE (ML) INJECTION AS NEEDED
Status: DISCONTINUED | OUTPATIENT
Start: 2024-07-05 | End: 2024-07-11 | Stop reason: HOSPADM

## 2024-07-05 RX ORDER — ROPINIROLE 0.25 MG/1
0.25 TABLET, FILM COATED ORAL NIGHTLY
Status: DISCONTINUED | OUTPATIENT
Start: 2024-07-05 | End: 2024-07-08

## 2024-07-05 RX ORDER — BISACODYL 10 MG
10 SUPPOSITORY, RECTAL RECTAL DAILY PRN
Status: DISCONTINUED | OUTPATIENT
Start: 2024-07-05 | End: 2024-07-11 | Stop reason: HOSPADM

## 2024-07-05 RX ORDER — METOPROLOL TARTRATE 1 MG/ML
5 INJECTION, SOLUTION INTRAVENOUS ONCE
Status: COMPLETED | OUTPATIENT
Start: 2024-07-05 | End: 2024-07-05

## 2024-07-05 RX ORDER — ACETAMINOPHEN 325 MG/1
650 TABLET ORAL EVERY 4 HOURS PRN
Status: DISCONTINUED | OUTPATIENT
Start: 2024-07-05 | End: 2024-07-11 | Stop reason: HOSPADM

## 2024-07-05 RX ORDER — ROPINIROLE 0.25 MG/1
1 TABLET, FILM COATED ORAL NIGHTLY
Status: ON HOLD | COMMUNITY
End: 2024-07-08 | Stop reason: SDDI

## 2024-07-05 RX ORDER — METOPROLOL SUCCINATE 25 MG/1
25 TABLET, EXTENDED RELEASE ORAL DAILY
Status: CANCELLED | OUTPATIENT
Start: 2024-07-05

## 2024-07-05 RX ORDER — ONDANSETRON 2 MG/ML
4 INJECTION INTRAMUSCULAR; INTRAVENOUS EVERY 6 HOURS PRN
Status: DISCONTINUED | OUTPATIENT
Start: 2024-07-05 | End: 2024-07-11 | Stop reason: HOSPADM

## 2024-07-05 RX ORDER — GABAPENTIN 600 MG/1
600 TABLET ORAL 4 TIMES DAILY
Status: ON HOLD | COMMUNITY
End: 2024-07-08

## 2024-07-05 RX ORDER — DILTIAZEM HCL/D5W 125 MG/125
5-15 PLASTIC BAG, INJECTION (ML) INTRAVENOUS CONTINUOUS
Status: DISCONTINUED | OUTPATIENT
Start: 2024-07-05 | End: 2024-07-07

## 2024-07-05 RX ORDER — UREA 10 %
5 LOTION (ML) TOPICAL NIGHTLY PRN
Status: DISCONTINUED | OUTPATIENT
Start: 2024-07-05 | End: 2024-07-11 | Stop reason: HOSPADM

## 2024-07-05 RX ORDER — NITROGLYCERIN 0.4 MG/1
0.4 TABLET SUBLINGUAL
Status: DISCONTINUED | OUTPATIENT
Start: 2024-07-05 | End: 2024-07-11 | Stop reason: HOSPADM

## 2024-07-05 RX ORDER — BISACODYL 5 MG/1
5 TABLET, DELAYED RELEASE ORAL DAILY PRN
Status: DISCONTINUED | OUTPATIENT
Start: 2024-07-05 | End: 2024-07-11 | Stop reason: HOSPADM

## 2024-07-05 RX ORDER — LANOLIN ALCOHOL/MO/W.PET/CERES
3 CREAM (GRAM) TOPICAL NIGHTLY
COMMUNITY

## 2024-07-05 RX ORDER — ACETAMINOPHEN 650 MG/1
650 SUPPOSITORY RECTAL EVERY 4 HOURS PRN
Status: DISCONTINUED | OUTPATIENT
Start: 2024-07-05 | End: 2024-07-11 | Stop reason: HOSPADM

## 2024-07-05 RX ORDER — AMOXICILLIN 250 MG
2 CAPSULE ORAL 2 TIMES DAILY PRN
Status: DISCONTINUED | OUTPATIENT
Start: 2024-07-05 | End: 2024-07-11 | Stop reason: HOSPADM

## 2024-07-05 RX ORDER — HYDROCODONE BITARTRATE AND ACETAMINOPHEN 5; 325 MG/1; MG/1
1 TABLET ORAL EVERY 6 HOURS PRN
Status: DISPENSED | OUTPATIENT
Start: 2024-07-05 | End: 2024-07-10

## 2024-07-05 RX ORDER — ONDANSETRON 4 MG/1
4 TABLET, ORALLY DISINTEGRATING ORAL EVERY 6 HOURS PRN
Status: DISCONTINUED | OUTPATIENT
Start: 2024-07-05 | End: 2024-07-11 | Stop reason: HOSPADM

## 2024-07-05 RX ORDER — ONDANSETRON 2 MG/ML
4 INJECTION INTRAMUSCULAR; INTRAVENOUS ONCE
Status: COMPLETED | OUTPATIENT
Start: 2024-07-05 | End: 2024-07-05

## 2024-07-05 RX ORDER — CYCLOBENZAPRINE HCL 5 MG
5 TABLET ORAL 2 TIMES DAILY PRN
COMMUNITY

## 2024-07-05 RX ORDER — POLYETHYLENE GLYCOL 3350 17 G/17G
17 POWDER, FOR SOLUTION ORAL DAILY PRN
Status: DISCONTINUED | OUTPATIENT
Start: 2024-07-05 | End: 2024-07-11 | Stop reason: HOSPADM

## 2024-07-05 RX ADMIN — Medication 10 MG/HR: at 17:21

## 2024-07-05 RX ADMIN — ONDANSETRON 4 MG: 2 INJECTION INTRAMUSCULAR; INTRAVENOUS at 10:18

## 2024-07-05 RX ADMIN — APIXABAN 5 MG: 5 TABLET, FILM COATED ORAL at 20:02

## 2024-07-05 RX ADMIN — HYDROCODONE BITARTRATE AND ACETAMINOPHEN 1 TABLET: 5; 325 TABLET ORAL at 20:02

## 2024-07-05 RX ADMIN — ROPINIROLE HYDROCHLORIDE 0.25 MG: 0.25 TABLET, FILM COATED ORAL at 20:02

## 2024-07-05 RX ADMIN — Medication 5 MG/HR: at 10:21

## 2024-07-05 RX ADMIN — Medication 5 MG: at 22:01

## 2024-07-05 RX ADMIN — HYDROMORPHONE HYDROCHLORIDE 0.5 MG: 1 INJECTION, SOLUTION INTRAMUSCULAR; INTRAVENOUS; SUBCUTANEOUS at 10:19

## 2024-07-05 RX ADMIN — METOPROLOL TARTRATE 5 MG: 1 INJECTION, SOLUTION INTRAVENOUS at 12:49

## 2024-07-05 NOTE — CASE MANAGEMENT/SOCIAL WORK
Discharge Planning Assessment  [facility]     Patient Name: Catalina Moreno  MRN: [id number]  Today's Date: 7/5/2024    Admit Date: 7/5/2024    Plan: Return home   Discharge Needs Assessment       Row Name 07/05/24 1216       Living Environment    People in Home alone    Current Living Arrangements home    Potentially Unsafe Housing Conditions none    In the past 12 months has the electric, gas, oil, or water company threatened to shut off services in your home? No    Primary Care Provided by self    Provides Primary Care For no one    Family Caregiver if Needed friend(s);child(evangelista), adult    Family Caregiver Names son Karri and Jordan,and friend    Quality of Family Relationships helpful    Able to Return to Prior Arrangements yes       Resource/Environmental Concerns    Resource/Environmental Concerns none    Transportation Concerns rides, unreliable from others  friend       Transportation Needs    In the past 12 months, has lack of transportation kept you from medical appointments or from getting medications? no       Food Insecurity    Within the past 12 months, you worried that your food would run out before you got the money to buy more. Never true    Within the past 12 months, the food you bought just didn't last and you didn't have money to get more. Never true       Transition Planning    Patient/Family Anticipates Transition to home    Patient/Family Anticipated Services at Transition none       Discharge Needs Assessment    Readmission Within the Last 30 Days no previous admission in last 30 days    Equipment Currently Used at Home walker, rolling;cane, straight    Concerns to be Addressed discharge planning    Equipment Needed After Discharge none                   Discharge Plan       Row Name 07/05/24 1219       Plan    Plan Return home    Plan Comments Spoke with patient who lives alone, states IADL's, confirmed PCP and Pharmacy. Denies transportatin or medication coverage issues.DC Barrier: Cardizem  gtt                  Continued Care and Services - Admitted Since 7/5/2024    No active coordination exists for this encounter.          Demographic Summary       Row Name 07/05/24 1215       General Information    Admission Type other (see comments)  currently no status    Arrived From home    Referral Source patient    Reason for Consult discharge planning    Preferred Language English                   Functional Status       Row Name 07/05/24 1215       Functional Status    Usual Activity Tolerance moderate    Current Activity Tolerance moderate       Physical Activity    On average, how many days per week do you engage in moderate to strenuous exercise (like a brisk walk)? 0 days    On average, how many minutes do you engage in exercise at this level? 0 min    Number of minutes of exercise per week 0       Assessment of Health Literacy    How often do you have someone help you read hospital materials? Occasionally    How often do you have problems learning about your medical condition because of difficulty understanding written information? Sometimes    How confident are you filling out medical forms by yourself? Quite a bit    Health Literacy Moderate       Functional Status, IADL    Medications independent    Meal Preparation independent    Housekeeping independent    Laundry independent    Shopping independent       Mental Status    General Appearance WDL WDL       Mental Status Summary    Recent Changes in Mental Status/Cognitive Functioning no changes           Met with patient in room wearing PPE: mask, face shield/goggles, gloves, gown.      Maintained distance greater than six feet and spent less than 15 minutes in the room.       Nan Weir RN

## 2024-07-05 NOTE — ED PROVIDER NOTES
Subjective   History of Present Illness  84-year-old female presented to the ED with complaints of a fall on 3 or 4 days ago where she fell out of bed and landed on her tailbone, now has low back pain and sacral pain that radiates to the bilateral lower extremities.  Also complains of worsening shortness of breath over the last few weeks as she has been out of her medications for 3 to 4 weeks now.  She is unable to state which medication she is out of but she states that she has only taken her Eliquis today.  She is supposed to be taking sotalol and metoprolol for her A-fib.  Also complains of right shoulder pain that is been worsening over the last several days.  Uses a cane at baseline and lives by herself.  Denies any numbness or tingling, saddle anesthesia, loss of bowel or bladder function, abdominal pain, hip pain, hitting her head or loss of consciousness.        Review of Systems   Respiratory:  Positive for shortness of breath. Negative for cough.    Cardiovascular:  Negative for chest pain.   Gastrointestinal:  Negative for abdominal pain.   Musculoskeletal:  Positive for arthralgias, back pain and myalgias. Negative for neck pain and neck stiffness.       Past Medical History:   Diagnosis Date    Acquired spondylolisthesis of lumbosacral region     Acute kidney injury 07/22/2022    Anxiety     Arthritis     Atrial fibrillation     paroxysmal    Broken shoulder     left-- due to fall 11-7-19 was at [facility]    Chronic pain disorder     Chronic systolic CHF (congestive heart failure) 01/05/2023    EF 36-40%    Coronary artery disease involving native coronary artery of native heart without angina pectoris 04/02/2021    nonobstructive    DDD (degenerative disc disease), cervical     DDD (degenerative disc disease), lumbar     Depression     Edema     9/2020 foot    GERD (gastroesophageal reflux disease)     H/O fall     Heart failure with mid-range ejection fraction 03/05/2022    EF 45% per 2D echo     Hypertension     Hypothyroidism     Insomnia     Low back pain     Moderate mitral regurgitation 01/05/2023    Neuropathy     Nonrheumatic tricuspid valve regurgitation     Pain in both feet     Polypharmacy     PONV (postoperative nausea and vomiting)     Pulmonary hypertension     Radiculopathy     Restless legs     Sacroiliitis     Sick sinus syndrome     Added automatically from request for surgery [id number]    Spondylolisthesis     Stage 3a chronic kidney disease     Urinary incontinence     Vitamin D deficiency        Allergies   Allergen Reactions    Baclofen Rash    Codeine Nausea Only    Ibuprofen Unknown (See Comments)     Patient doesn't know----Motin    Methocarbamol Unknown (See Comments)     Patient doesn't know    Naproxen Unknown (See Comments)     Pt. Doesn't know     Tizanidine Hcl Other (See Comments)     Syncope     Tolmetin Dizziness     Pt. Doesn't know-- same as Tolectin       Past Surgical History:   Procedure Laterality Date    BACK SURGERY  07/19/2018    PDC &  PSF L3-L5 insitu    CARDIAC CATHETERIZATION N/A 4/2/2021    Procedure: Cardiac Catheterization/Vascular Study;  Surgeon: Barrett Evans MD;  Location: [facility] CATH INVASIVE LOCATION;  Service: Cardiovascular;  Laterality: N/A;    CARDIAC ELECTROPHYSIOLOGY PROCEDURE N/A 1/17/2023    Procedure: Pacemaker DC new;  Surgeon: Baljeet Valero MD;  Location: [facility] CATH INVASIVE LOCATION;  Service: Cardiovascular;  Laterality: N/A;    CHOLECYSTECTOMY      COLONOSCOPY      ENDOSCOPY N/A 9/14/2023    Procedure: ESOPHAGOGASTRODUODENOSCOPY;  Surgeon: Ulysses Lamas MD;  Location: [facility] ENDOSCOPY;  Service: Gastroenterology;  Laterality: N/A;  gastritis, inflammatory gastric polyp    HYSTERECTOMY      ROTATOR CUFF REPAIR Left        Family History   Problem Relation Age of Onset    Cancer Father     Diabetes Son     Cancer Paternal Aunt     Heart disease Paternal Aunt     Cancer Paternal Uncle        Social History      Socioeconomic History    Marital status:    Tobacco Use    Smoking status: Never    Smokeless tobacco: Never   Vaping Use    Vaping status: Never Used   Substance and Sexual Activity    Alcohol use: No    Drug use: Never    Sexual activity: Defer           Objective   Physical Exam  Vitals and nursing note reviewed.   Constitutional:       Appearance: Normal appearance.   HENT:      Head: Normocephalic and atraumatic.   Eyes:      Extraocular Movements: Extraocular movements intact.      Conjunctiva/sclera: Conjunctivae normal.   Cardiovascular:      Rate and Rhythm: Tachycardia present. Rhythm irregular.      Pulses: Normal pulses.      Heart sounds: Normal heart sounds.   Pulmonary:      Effort: Pulmonary effort is normal.      Breath sounds: Normal breath sounds.   Abdominal:      General: Bowel sounds are normal.      Palpations: Abdomen is soft.      Tenderness: There is no abdominal tenderness.   Musculoskeletal:         General: Tenderness present.      Right lower leg: Edema present.      Left lower leg: Edema present.      Comments: No step-off or obvious deformity felt of the lower back but there is tenderness to palpation. Able to perform full range of motion of the right shoulder but complains of mild pain when doing so.  Patient has generalized weakness when moving bilateral lower extremities.    Trace edema to the bilateral lower extremities   Skin:     General: Skin is warm and dry.      Findings: Bruising present.      Comments: Mild bruising to sacral region   Neurological:      Mental Status: She is alert and oriented to person, place, and time.   Psychiatric:         Mood and Affect: Mood normal.         Behavior: Behavior normal.         Thought Content: Thought content normal.         Judgment: Judgment normal.         Procedures           ED Course  ED Course as of 07/05/24 1617   Fri Jul 05, 2024   1245 On reassessment, patient states her pain is better at this time and denies any  "chest pain or shortness of breath. [KB]      ED Course User Index  [KB] Krishna Leblanc, APRN      /58   Pulse 70   Temp 97.8 °F (36.6 °C) (Oral)   Resp 15   Ht 160 cm (63\")   Wt 74.8 kg (165 lb)   SpO2 95%   BMI 29.23 kg/m²   Labs Reviewed   COMPREHENSIVE METABOLIC PANEL - Abnormal; Notable for the following components:       Result Value    Glucose 106 (*)     BUN 26 (*)     Creatinine 1.15 (*)     CO2 19.5 (*)     eGFR 47.4 (*)     All other components within normal limits    Narrative:     GFR Normal >60  Chronic Kidney Disease <60  Kidney Failure <15    The GFR formula is only valid for adults with stable renal function between ages 18 and 70.   BNP (IN-HOUSE) - Abnormal; Notable for the following components:    proBNP 10,939.0 (*)     All other components within normal limits    Narrative:     This assay is used as an aid in the diagnosis of individuals suspected of having heart failure. It can be used as an aid in the diagnosis of acute decompensated heart failure (ADHF) in patients presenting with signs and symptoms of ADHF to the emergency department (ED). In addition, NT-proBNP of <300 pg/mL indicates ADHF is not likely.    Age Range Result Interpretation  NT-proBNP Concentration (pg/mL:      <50             Positive            >450                   Gray                 300-450                    Negative             <300    50-75           Positive            >900                  Gray                300-900                  Negative            <300      >75             Positive            >1800                  Gray                300-1800                  Negative            <300   TROPONIN - Abnormal; Notable for the following components:    HS Troponin T 18 (*)     All other components within normal limits    Narrative:     High Sensitive Troponin T Reference Range:  <14.0 ng/L- Negative Female for AMI  <22.0 ng/L- Negative Male for AMI  >=14 - Abnormal Female indicating possible " myocardial injury.  >=22 - Abnormal Male indicating possible myocardial injury.   Clinicians would have to utilize clinical acumen, EKG, Troponin, and serial changes to determine if it is an Acute Myocardial Infarction or myocardial injury due to an underlying chronic condition.        CBC WITH AUTO DIFFERENTIAL - Abnormal; Notable for the following components:    Hemoglobin 9.1 (*)     Hematocrit 29.8 (*)     MCV 78.8 (*)     MCH 24.1 (*)     MCHC 30.5 (*)     RDW 17.3 (*)     All other components within normal limits   HIGH SENSITIVITIY TROPONIN T 2HR - Abnormal; Notable for the following components:    HS Troponin T 17 (*)     All other components within normal limits    Narrative:     High Sensitive Troponin T Reference Range:  <14.0 ng/L- Negative Female for AMI  <22.0 ng/L- Negative Male for AMI  >=14 - Abnormal Female indicating possible myocardial injury.  >=22 - Abnormal Male indicating possible myocardial injury.   Clinicians would have to utilize clinical acumen, EKG, Troponin, and serial changes to determine if it is an Acute Myocardial Infarction or myocardial injury due to an underlying chronic condition.        D-DIMER, QUANTITATIVE - Normal    Narrative:     According to the assay 's published package insert, a normal (<0.50 mg/L (FEU)) D-dimer result in conjunction with a non-high clinical probability assessment, excludes deep vein thrombosis (DVT) and pulmonary embolism (PE) with high sensitivity.    D-dimer values increase with age and this can make VTE exclusion of an older population difficult. To address this, the [employer], based on best available evidence and recent guidelines, recommends that clinicians use age-adjusted D-dimer thresholds in patients greater than 50 years of age with: a) a low probability of PE who do not meet all Pulmonary Embolism Rule Out Criteria, or b) in those with intermediate probability of PE.   The formula for an age-adjusted  "D-dimer cut-off is \"age/100\".  For example, a 60 year old patient would have an age-adjusted cut-off of 0.60 mg/L (FEU) and an 80 year old 0.80 mg/L (FEU).   MAGNESIUM - Normal   RAINBOW DRAW    Narrative:     The following orders were created for panel order [place] Draw.  Procedure                               Abnormality         Status                     ---------                               -----------         ------                     Green Top (Gel)[[id number]]                                  Final result               Lavender Top[[id number]]                                     Final result               Light Blue Top[[id number]]                                   Final result                 Please view results for these tests on the individual orders.   GREEN TOP   LAVENDER TOP   LIGHT BLUE TOP   CBC AND DIFFERENTIAL    Narrative:     The following orders were created for panel order CBC & Differential.  Procedure                               Abnormality         Status                     ---------                               -----------         ------                     CBC Auto Differential[[id number]]        Abnormal            Final result                 Please view results for these tests on the individual orders.     Medications   sodium chloride 0.9 % flush 10 mL (has no administration in time range)   sodium chloride 0.9 % flush 10 mL (has no administration in time range)   dilTIAZem (CARDIZEM) 125 mg in 125 mL D5W infusion (15 mg/hr Intravenous Rate/Dose Change 7/5/24 3497)   nitroglycerin (NITROSTAT) SL tablet 0.4 mg (has no administration in time range)   Potassium Replacement - Follow Nurse / BPA Driven Protocol (has no administration in time range)   Magnesium Standard Dose Replacement - Follow Nurse / BPA Driven Protocol (has no administration in time range)   Phosphorus Replacement - Follow Nurse / BPA Driven Protocol (has no administration in time range)   Calcium Replacement - Follow " Nurse / BPA Driven Protocol (has no administration in time range)   acetaminophen (TYLENOL) tablet 650 mg (has no administration in time range)     Or   acetaminophen (TYLENOL) 160 MG/5ML oral solution 650 mg (has no administration in time range)     Or   acetaminophen (TYLENOL) suppository 650 mg (has no administration in time range)   sennosides-docusate (PERICOLACE) 8.6-50 MG per tablet 2 tablet (has no administration in time range)     And   polyethylene glycol (MIRALAX) packet 17 g (has no administration in time range)     And   bisacodyl (DULCOLAX) EC tablet 5 mg (has no administration in time range)     And   bisacodyl (DULCOLAX) suppository 10 mg (has no administration in time range)   melatonin tablet 5 mg (has no administration in time range)   ondansetron ODT (ZOFRAN-ODT) disintegrating tablet 4 mg (has no administration in time range)     Or   ondansetron (ZOFRAN) injection 4 mg (has no administration in time range)   apixaban (ELIQUIS) tablet 5 mg (has no administration in time range)   dilTIAZem (CARDIZEM) bolus from bag 1 mg/mL solution 10 mg (10 mg Intravenous Bolus from Bag 7/5/24 1025)   HYDROmorphone (DILAUDID) injection 0.5 mg (0.5 mg Intravenous Given 7/5/24 1019)   ondansetron (ZOFRAN) injection 4 mg (4 mg Intravenous Given 7/5/24 1018)   metoprolol tartrate (LOPRESSOR) injection 5 mg (5 mg Intravenous Given 7/5/24 1249)     XR Sacrum & Coccyx    Result Date: 7/5/2024  Impression: 1. No evidence of acute L-spine fracture or subluxation. Stable degenerative changes in spondylolisthesis. 2. No evidence of displaced sacrococcygeal fracture. Electronically Signed: Quan Cazares MD  7/5/2024 11:54 AM EDT  Workstation ID: [id number]    XR Spine Lumbar Complete 4+VW    Result Date: 7/5/2024  Impression: 1. No evidence of acute L-spine fracture or subluxation. Stable degenerative changes in spondylolisthesis. 2. No evidence of displaced sacrococcygeal fracture. Electronically Signed: Quan Cazares MD   7/5/2024 11:54 AM EDT  Workstation ID: [id number]    XR Chest 1 View    Result Date: 7/5/2024  Impression: Cardiomegaly with pulmonary vascular congestion and mild interstitial edema. No focal consolidation or pleural disease. Electronically Signed: Lee Paris MD  7/5/2024 11:43 AM EDT  Workstation ID: [id number]    XR Shoulder 2+ View Right    Result Date: 7/5/2024  Impression: Degenerative changes with no definite acute osseous abnormality noted Electronically Signed: Cesar Arzate MD  7/5/2024 11:42 AM EDT  Workstation ID: [id number]                                          Medical Decision Making  Patient was seen for the above complaints.  Patient has a history of A-fib along with a pacemaker.  IV was established and blood work was obtained to assess for electrolyte abnormalities, cardiac function, infection.  D-dimer negative at 0.73, BNP 10,939, creatinine mildly elevated at 1.15 with a BUN of 26 and a GFR 47, CO2 19.5, initial troponin 18 with a repeat troponin of 17, mag 2, white blood cell count 6.89, hemoglobin of 9.1 with last hemoglobin of 11.8.  Patient was found to be in A-fib RVR on initial assessment, was started on Cardizem drip with a 10 mg bolus.  Considered giving fluids for dehydration however patient's BNP is elevated, will defer this at this time. chest x-ray is obtained and independently interpreted by the radiologist as cardiomegaly with pulmonary vascular congestion and mild interstitial edema without focal consolidation or pleural disease.  Obtained x-ray of lumbar spine and sacrum coccyx as patient recently had a fall at home, this was also independently interpreted by the radiologist as no evidence of acute L-spine fracture or subluxation- stable degenerative changes, no evidence of displaced sacrococcygeal fracture.  Also obtain right shoulder x-ray as patient was complaining of pain, this was also independently interpreted by the radiologist as degenerative changes with no definitive  acute osseous abnormality noted.  Patient was given Dilaudid and Zofran for the pain, on reassessment states she feels significantly better at this time and is eating a turkey sandwich.  Heart rate was still in the 120s 130s on 15 mg/h of Cardizem.  Discussed this case with Dr. Greco who stated to give another 5 mg of Lopressor.  On reassessment, patient's heart rate was between 80 and 100-still atrial fibrillation.  Patient denied chest pain on assessment, believe the troponin value is elevated due to the tachycardia.  Patient has been without her medications for several weeks now and lives alone.  Patient will be admitted to the hospitalist service, spoke to Celia AUGUSTE with the hospitalist service who agreed to admit patient, also discussed possible need for physical therapy or rehabilitation placement.  On chart review, patient was recently admitted on 3/21/2024 for facial fractures from a fall and A-fib RVR.    Based on the clinical findings at this time I anticipate the patient will require a 2 midnight stay      Problems Addressed:  ARIELA (acute kidney injury): complicated acute illness or injury  Atrial fibrillation with RVR: complicated acute illness or injury  Fall, initial encounter: complicated acute illness or injury  Right shoulder pain, unspecified chronicity: complicated acute illness or injury  Sacral back pain: complicated acute illness or injury    Amount and/or Complexity of Data Reviewed  Labs: ordered. Decision-making details documented in ED Course.  Radiology: ordered and independent interpretation performed. Decision-making details documented in ED Course.  ECG/medicine tests: ordered and independent interpretation performed. Decision-making details documented in ED Course.    Risk  Prescription drug management.  Decision regarding hospitalization.        Final diagnoses:   Fall, initial encounter   Sacral back pain   Right shoulder pain, unspecified chronicity   Atrial fibrillation with  RVR   ARIELA (acute kidney injury)       ED Disposition  ED Disposition       ED Disposition   Decision to Admit    Condition   --    Comment   Level of Care: Telemetry [5]   Diagnosis: Atrial fibrillation with RVR [652106]   Admitting Physician: CHAVEZ PATRICIA [[id number]]   Certification: I Certify That Inpatient Hospital Services Are Medically Necessary For Greater Than 2 Midnights                 No follow-up provider specified.       Medication List      No changes were made to your prescriptions during this visit.            Krishna Leblanc, APRN  07/05/24 3123

## 2024-07-05 NOTE — PLAN OF CARE
Goal Outcome Evaluation:  Plan of Care Reviewed With: patient        Progress: no change  Outcome Evaluation: Patient admitted with afib with RVR. Cardizem gtt infusing at 5mg/hr. Dr. Gonzalez consulted. Patient is currently paced with HR of 70. Bilateral lower legs are restless. Provider made aware. Awaiting medication orders. Patient is requiring 1.5L of oxygen in which she does not wear any at home. Fall precautions remain in place and call light within reach.

## 2024-07-05 NOTE — H&P
[facility] Medicine Services  History & Physical    Patient Name: Catalina Moreno  : 1940  MRN: [id number]  Primary Care Physician:  Flash Gonzalez MD  Date of admission: 2024  Date and Time of Service: 2024 at 1445    Subjective      Chief Complaint: Dyspnea, frequent falls    History of Present Illness: Catalina Moreno is a 84 y.o. female with a CMH of CHF, ARIELA, hypothyroidism, GERD who presented to [facility] on 2024 with shortness of breath that developed over the last three to four weeks. Patient reports not taking one of her medications but not remembering which ones. She has been taking her Eliquis daily for atrial fibrillation. Also, patient fell 3-4 days ago at home on her tailbone. She lives alone and reports having no familial support. Says her   about 7 years ago. Current complaint of low back pain, left leg pain and left shoulder pain. Reports shortness of breath is improved. Of note, patient was admitted here 24 - 3/26/24 for A fib RVR, and a fall with closed nasal bone fracture and left ankle sprain.     On ED evaluation, patient noted to be in A-fib RVR. She was started on a Cardizem bolus and drip. HR now in 80-90s. BNP 10,939. XR of sacrum, coccyx, lumbar spine and right shoulder all negative for fracture. Troponin 18, 17. Hgb 8.9, baseline at 11.0. Cardiology consulted. Hospitalist team to admit for further medical management.         Review of Systems   Respiratory:  Positive for shortness of breath. Negative for cough and chest tightness.    Cardiovascular:  Negative for chest pain and leg swelling.   Genitourinary:  Negative for dysuria.   Musculoskeletal:  Positive for arthralgias.       Personal History     Past Medical History:   Diagnosis Date    Acquired spondylolisthesis of lumbosacral region     Acute kidney injury 2022    Anxiety     Arthritis     Atrial fibrillation     paroxysmal    Broken shoulder     left-- due to  fall 11-7-19 was at [facility]    Chronic pain disorder     Chronic systolic CHF (congestive heart failure) 01/05/2023    EF 36-40%    Coronary artery disease involving native coronary artery of native heart without angina pectoris 04/02/2021    nonobstructive    DDD (degenerative disc disease), cervical     DDD (degenerative disc disease), lumbar     Depression     Edema     9/2020 foot    GERD (gastroesophageal reflux disease)     H/O fall     Heart failure with mid-range ejection fraction 03/05/2022    EF 45% per 2D echo    Hypertension     Hypothyroidism     Insomnia     Low back pain     Moderate mitral regurgitation 01/05/2023    Neuropathy     Nonrheumatic tricuspid valve regurgitation     Pain in both feet     Polypharmacy     PONV (postoperative nausea and vomiting)     Pulmonary hypertension     Radiculopathy     Restless legs     Sacroiliitis     Sick sinus syndrome     Added automatically from request for surgery [id number]    Spondylolisthesis     Stage 3a chronic kidney disease     Urinary incontinence     Vitamin D deficiency        Past Surgical History:   Procedure Laterality Date    BACK SURGERY  07/19/2018    PDC &  PSF L3-L5 insitu    CARDIAC CATHETERIZATION N/A 4/2/2021    Procedure: Cardiac Catheterization/Vascular Study;  Surgeon: Barrett Evans MD;  Location: [facility] CATH INVASIVE LOCATION;  Service: Cardiovascular;  Laterality: N/A;    CARDIAC ELECTROPHYSIOLOGY PROCEDURE N/A 1/17/2023    Procedure: Pacemaker DC new;  Surgeon: Baljeet Valero MD;  Location: [facility] CATH INVASIVE LOCATION;  Service: Cardiovascular;  Laterality: N/A;    CHOLECYSTECTOMY      COLONOSCOPY      ENDOSCOPY N/A 9/14/2023    Procedure: ESOPHAGOGASTRODUODENOSCOPY;  Surgeon: Ulysses Lamas MD;  Location: [facility] ENDOSCOPY;  Service: Gastroenterology;  Laterality: N/A;  gastritis, inflammatory gastric polyp    HYSTERECTOMY      ROTATOR CUFF REPAIR Left        Family History: family history includes Cancer  in her father, paternal aunt, and paternal uncle; Diabetes in her son; Heart disease in her paternal aunt. Otherwise pertinent FHx was reviewed and not pertinent to current issue.    Social History:  reports that she has never smoked. She has never used smokeless tobacco. She reports that she does not drink alcohol and does not use drugs.    Home Medications:  Prior to Admission Medications       Prescriptions Last Dose Informant Patient Reported? Taking?    apixaban (ELIQUIS) 5 MG tablet tablet 7/5/2024  No Yes    Take 1 tablet by mouth Every 12 (Twelve) Hours. Indications: Atrial Fibrillation    cyclobenzaprine (FLEXERIL) 5 MG tablet   Yes No    Take 1 tablet by mouth 2 (Two) Times a Day As Needed for Muscle Spasms.    metoprolol succinate XL (TOPROL-XL) 25 MG 24 hr tablet 7/4/2024  Yes Yes    Take 1 tablet by mouth Daily.              Allergies:  Allergies   Allergen Reactions    Baclofen Rash    Codeine Nausea Only    Ibuprofen Unknown (See Comments)     Patient doesn't know----Motin    Methocarbamol Unknown (See Comments)     Patient doesn't know    Naproxen Unknown (See Comments)     Pt. Doesn't know     Tizanidine Hcl Other (See Comments)     Syncope     Tolmetin Dizziness     Pt. Doesn't know-- same as Tolectin       Objective      Vitals:   Temp:  [97.8 °F (36.6 °C)] 97.8 °F (36.6 °C)  Heart Rate:  [] 92  Resp:  [13-22] 14  BP: (108-147)/() 108/60  Body mass index is 29.23 kg/m².  Physical Exam  Vitals and nursing note reviewed.   Constitutional:       Appearance: Normal appearance.   HENT:      Head: Normocephalic and atraumatic.      Nose: Nose normal.      Mouth/Throat:      Mouth: Mucous membranes are moist.   Eyes:      Extraocular Movements: Extraocular movements intact.      Pupils: Pupils are equal, round, and reactive to light.   Cardiovascular:      Rate and Rhythm: Normal rate and regular rhythm.      Pulses: Normal pulses.   Pulmonary:      Breath sounds: Normal breath sounds.    Abdominal:      General: Bowel sounds are normal.      Palpations: Abdomen is soft.   Musculoskeletal:         General: No swelling.      Right shoulder: Tenderness present.      Cervical back: Normal range of motion and neck supple.      Lumbar back: No signs of trauma or lacerations.      Right lower leg: No edema.      Left lower leg: No edema.   Skin:     General: Skin is warm and dry.      Capillary Refill: Capillary refill takes less than 2 seconds.   Neurological:      Mental Status: She is alert and oriented to person, place, and time.         Diagnostic Data:  Lab Results (last 24 hours)       Procedure Component Value Units Date/Time    High Sensitivity Troponin T 2Hr [[id number]]  (Abnormal) Collected: 07/05/24 1156    Specimen: Blood from Arm, Right Updated: 07/05/24 1225     HS Troponin T 17 ng/L      Troponin T Delta -1 ng/L     Narrative:      High Sensitive Troponin T Reference Range:  <14.0 ng/L- Negative Female for AMI  <22.0 ng/L- Negative Male for AMI  >=14 - Abnormal Female indicating possible myocardial injury.  >=22 - Abnormal Male indicating possible myocardial injury.   Clinicians would have to utilize clinical acumen, EKG, Troponin, and serial changes to determine if it is an Acute Myocardial Infarction or myocardial injury due to an underlying chronic condition.         Comprehensive Metabolic Panel [[id number]]  (Abnormal) Collected: 07/05/24 0953    Specimen: Blood from Arm, Left Updated: 07/05/24 1037     Glucose 106 mg/dL      BUN 26 mg/dL      Creatinine 1.15 mg/dL      Sodium 137 mmol/L      Potassium 4.4 mmol/L      Chloride 103 mmol/L      CO2 19.5 mmol/L      Calcium 9.5 mg/dL      Total Protein 7.4 g/dL      Albumin 4.5 g/dL      ALT (SGPT) 9 U/L      AST (SGOT) 24 U/L      Alkaline Phosphatase 90 U/L      Total Bilirubin 1.2 mg/dL      Globulin 2.9 gm/dL      A/G Ratio 1.6 g/dL      BUN/Creatinine Ratio 22.6     Anion Gap 14.5 mmol/L      eGFR 47.4 mL/min/1.73     Narrative:       GFR Normal >60  Chronic Kidney Disease <60  Kidney Failure <15    The GFR formula is only valid for adults with stable renal function between ages 18 and 70.    BNP [[id number]]  (Abnormal) Collected: 07/05/24 0953    Specimen: Blood from Arm, Left Updated: 07/05/24 1037     proBNP 10,939.0 pg/mL     Narrative:      This assay is used as an aid in the diagnosis of individuals suspected of having heart failure. It can be used as an aid in the diagnosis of acute decompensated heart failure (ADHF) in patients presenting with signs and symptoms of ADHF to the emergency department (ED). In addition, NT-proBNP of <300 pg/mL indicates ADHF is not likely.    Age Range Result Interpretation  NT-proBNP Concentration (pg/mL:      <50             Positive            >450                   Gray                 300-450                    Negative             <300    50-75           Positive            >900                  Gray                300-900                  Negative            <300      >75             Positive            >1800                  Gray                300-1800                  Negative            <300    High Sensitivity Troponin T [[id number]]  (Abnormal) Collected: 07/05/24 0953    Specimen: Blood from Arm, Left Updated: 07/05/24 1037     HS Troponin T 18 ng/L     Narrative:      High Sensitive Troponin T Reference Range:  <14.0 ng/L- Negative Female for AMI  <22.0 ng/L- Negative Male for AMI  >=14 - Abnormal Female indicating possible myocardial injury.  >=22 - Abnormal Male indicating possible myocardial injury.   Clinicians would have to utilize clinical acumen, EKG, Troponin, and serial changes to determine if it is an Acute Myocardial Infarction or myocardial injury due to an underlying chronic condition.         Magnesium [[id number]]  (Normal) Collected: 07/05/24 0953    Specimen: Blood from Arm, Left Updated: 07/05/24 1037     Magnesium 2.0 mg/dL     CBC & Differential [[id number]]  (Abnormal)  "Collected: 07/05/24 0953    Specimen: Blood from Arm, Left Updated: 07/05/24 1024    Narrative:      The following orders were created for panel order CBC & Differential.  Procedure                               Abnormality         Status                     ---------                               -----------         ------                     CBC Auto Differential[[id number]]        Abnormal            Final result                 Please view results for these tests on the individual orders.    D-dimer, Quantitative [[id number]]  (Normal) Collected: 07/05/24 0953    Specimen: Blood from Arm, Left Updated: 07/05/24 1024     D-Dimer, Quantitative 0.73 mg/L (FEU)     Narrative:      According to the assay 's published package insert, a normal (<0.50 mg/L (FEU)) D-dimer result in conjunction with a non-high clinical probability assessment, excludes deep vein thrombosis (DVT) and pulmonary embolism (PE) with high sensitivity.    D-dimer values increase with age and this can make VTE exclusion of an older population difficult. To address this, the [employer], based on best available evidence and recent guidelines, recommends that clinicians use age-adjusted D-dimer thresholds in patients greater than 50 years of age with: a) a low probability of PE who do not meet all Pulmonary Embolism Rule Out Criteria, or b) in those with intermediate probability of PE.   The formula for an age-adjusted D-dimer cut-off is \"age/100\".  For example, a 60 year old patient would have an age-adjusted cut-off of 0.60 mg/L (FEU) and an 80 year old 0.80 mg/L (FEU).    CBC Auto Differential [[id number]]  (Abnormal) Collected: 07/05/24 0953    Specimen: Blood from Arm, Left Updated: 07/05/24 1024     WBC 6.89 10*3/mm3      RBC 3.78 10*6/mm3      Hemoglobin 9.1 g/dL      Hematocrit 29.8 %      MCV 78.8 fL      MCH 24.1 pg      MCHC 30.5 g/dL      RDW 17.3 %      RDW-SD 48.7 fl      MPV 10.8 fL      Platelets 254 " 10*3/mm3      Neutrophil % 64.6 %      Lymphocyte % 25.7 %      Monocyte % 6.2 %      Eosinophil % 1.9 %      Basophil % 1.3 %      Immature Grans % 0.3 %      Neutrophils, Absolute 4.45 10*3/mm3      Lymphocytes, Absolute 1.77 10*3/mm3      Monocytes, Absolute 0.43 10*3/mm3      Eosinophils, Absolute 0.13 10*3/mm3      Basophils, Absolute 0.09 10*3/mm3      Immature Grans, Absolute 0.02 10*3/mm3      nRBC 0.0 /100 WBC     [place] Draw [[id number]] Collected: 07/05/24 0953    Specimen: Blood from Arm, Left Updated: 07/05/24 1000    Narrative:      The following orders were created for panel order [place] Draw.  Procedure                               Abnormality         Status                     ---------                               -----------         ------                     Green Top (Gel)[[id number]]                                  Final result               Lavender Top[[id number]]                                     Final result               Light Blue Top[[id number]]                                   Final result                 Please view results for these tests on the individual orders.    Green Top (Gel) [[id number]] Collected: 07/05/24 0953    Specimen: Blood from Arm, Left Updated: 07/05/24 1000     Extra Tube Hold for add-ons.     Comment: Auto resulted.       Lavender Top [[id number]] Collected: 07/05/24 0953    Specimen: Blood from Arm, Left Updated: 07/05/24 1000     Extra Tube hold for add-on     Comment: Auto resulted       Light Blue Top [[id number]] Collected: 07/05/24 0953    Specimen: Blood from Arm, Left Updated: 07/05/24 1000     Extra Tube Hold for add-ons.     Comment: Auto resulted                Imaging Results (Last 24 Hours)       Procedure Component Value Units Date/Time    XR Sacrum & Coccyx [[id number]] Collected: 07/05/24 1149     Updated: 07/05/24 1156    Narrative:      XR SPINE LUMBAR COMPLETE 4+VW, XR SACRUM AND COCCYX    Date of Exam: 7/5/2024 11:07 AM EDT    Indication: low  back pain s/p fall 3 or 4 days ago    Comparison: 3/21/2024    Findings:  Lumbar spine: 5 images. Stable mild anterolisthesis of L4 on L5 and L5 on S1. There is otherwise normal alignment. No acute fracture or subluxation identified. There is multilevel degenerative change, worst at L1-2. Mild facet arthrosis, worst from L3-4   through L5-S1. No pars defect identified. There are surgical clips in the right upper quadrant abdomen.    Sacrum and coccyx: 3 images. No evidence of displaced fracture. Evaluation somewhat limited by osteopenia.      Impression:      Impression:    1. No evidence of acute L-spine fracture or subluxation. Stable degenerative changes in spondylolisthesis.  2. No evidence of displaced sacrococcygeal fracture.      Electronically Signed: Quan Cazares MD    7/5/2024 11:54 AM EDT    Workstation ID: [id number]    XR Spine Lumbar Complete 4+VW [[id number]] Collected: 07/05/24 1149     Updated: 07/05/24 1156    Narrative:      XR SPINE LUMBAR COMPLETE 4+VW, XR SACRUM AND COCCYX    Date of Exam: 7/5/2024 11:07 AM EDT    Indication: low back pain s/p fall 3 or 4 days ago    Comparison: 3/21/2024    Findings:  Lumbar spine: 5 images. Stable mild anterolisthesis of L4 on L5 and L5 on S1. There is otherwise normal alignment. No acute fracture or subluxation identified. There is multilevel degenerative change, worst at L1-2. Mild facet arthrosis, worst from L3-4   through L5-S1. No pars defect identified. There are surgical clips in the right upper quadrant abdomen.    Sacrum and coccyx: 3 images. No evidence of displaced fracture. Evaluation somewhat limited by osteopenia.      Impression:      Impression:    1. No evidence of acute L-spine fracture or subluxation. Stable degenerative changes in spondylolisthesis.  2. No evidence of displaced sacrococcygeal fracture.      Electronically Signed: Quan Cazares MD    7/5/2024 11:54 AM EDT    Workstation ID: [id number]    XR Chest 1 View [[id number]] Collected:  07/05/24 1142     Updated: 07/05/24 1145    Narrative:      XR CHEST 1 VW    Date of Exam: 7/5/2024 10:45 AM EDT    Indication: SOA, worsening over the last few days    Comparison: Chest radiograph 3/21/2024.    Findings:  Dual-lead left chest pacemaker. Enlarged cardiac silhouette, unchanged. Pulmonary vascular congestion with diffuse interstitial thickening. No dominant focal consolidation. Unchanged mild right hemidiaphragm elevation. No pleural effusion or   pneumothorax. Osseous structures are unchanged.      Impression:      Impression:  Cardiomegaly with pulmonary vascular congestion and mild interstitial edema. No focal consolidation or pleural disease.      Electronically Signed: Lee Paris MD    7/5/2024 11:43 AM EDT    Workstation ID: [id number]    XR Shoulder 2+ View Right [[id number]] Collected: 07/05/24 1136     Updated: 07/05/24 1144    Narrative:      XR SHOULDER 2+ VW RIGHT    Date of Exam: 7/5/2024 10:51 AM EDT    Indication: R shoulder pain for several days    Comparison: None available.    Findings:  No acute fracture or dislocation noted. Mild degenerative changes are noted at the AC joint. Mild to moderate degenerative change noted at the glenohumeral joint. Limited imaging of the chest demonstrates leads from pacemaker. Soft tissues demonstrate no   acute abnormality      Impression:      Impression:  Degenerative changes with no definite acute osseous abnormality noted      Electronically Signed: Cesar Arzate MD    7/5/2024 11:42 AM EDT    Workstation ID: [id number]              Assessment & Plan        This is a 84 y.o. female with:    Active and Resolved Problems  Active Hospital Problems    Diagnosis  POA    **Atrial fibrillation with RVR [I48.91]  Yes      Resolved Hospital Problems   No resolved problems to display.       Atrial fibrillation with RVR  -On Cardizem, rate now 80-90  -BP stable   -Eliquis continued  -Cardiology consulted    H/O HF  - BNP 10,939  - last echo 9/7/23 with EF  51-55%  - repeat echo pending  - likely 2/2 A-fib     Tailbone/Shoulder Pain  - tylenol prn for pain   - patient was given one time dose of dilaudid in ER which caused some mild respiratory depression  - XR negative for fractures      Frequent Falls  - lives alone, no family for support  - frequent falls and admissions  - patient open to discussing assisted living facility   -PT/OT/CM consulted     Anemia  - H/H 9.1, 29.8  - Baseline H/H 11.0, 29.8  - Transfuse if <7     H/O Hypothyroidism   - TSH last admit was 8.9  - repeat TSH pending  - may need endocrinology consult  - patient denies taking synthroid        VTE Prophylaxis:  Pharmacologic VTE prophylaxis orders are signed & held.          The patient desires to be as follows:    CODE STATUS:    Level Of Support Discussed With: Patient  Code Status (Patient has no pulse and is not breathing): CPR (Attempt to Resuscitate)  Medical Interventions (Patient has pulse or is breathing): Full Support        Admission Status:  I believe this patient meets inpatient status.    Expected Length of Stay: >24 hours    PDMP and Medication Dispenses via Sidebar reviewed and consistent with patient reported medications.    I discussed the patient's findings and my recommendations with patient.      Signature:     This document has been electronically signed by KALYANI Thakkar on July 5, 2024 13:44 EDT   [facility]ist Team

## 2024-07-05 NOTE — ED NOTES
Nursing report ED to floor  Catalina Moreno  84 y.o.  female    HPI:   Chief Complaint   Patient presents with    Fall       Admitting doctor:   Radu Nagy MD    Admitting diagnosis:   The primary encounter diagnosis was Fall, initial encounter. Diagnoses of Sacral back pain, Right shoulder pain, unspecified chronicity, Atrial fibrillation with RVR, and ARIELA (acute kidney injury) were also pertinent to this visit.    Code status:   Current Code Status       Date Active Code Status Order ID Comments User Context       7/5/2024 1342 CPR (Attempt to Resuscitate) [id number]  Christal Tubbs APRN ED        Question Answer    Code Status (Patient has no pulse and is not breathing) CPR (Attempt to Resuscitate)    Medical Interventions (Patient has pulse or is breathing) Full Support    Level Of Support Discussed With Patient                    Allergies:   Baclofen, Codeine, Ibuprofen, Methocarbamol, Naproxen, Tizanidine hcl, and Tolmetin    Isolation:  No active isolations     Fall Risk:  Fall Risk Assessment was completed, and patient is at low risk for falls.   Predictive Model Details         12 (Low) Factor Value    Calculated 7/5/2024 15:59 Age 84    Risk of Fall Model Active Peripheral IV Present     Imaging order in this encounter Present     Magnesium 2 mg/dL     Diastolic BP 58     Number of Distinct Medication Classes administered 4     Rick Scale not on file     Total Bilirubin 1.2 mg/dL     Creatinine 1.15 mg/dL     Cardiac Assessment X     Number of administrations of Analgesic Narcotics 1     Days after Admission 0.267     Chloride 103 mmol/L     Potassium 4.4 mmol/L     Calcium 9.5 mg/dL     ALT 9 U/L     Albumin 4.5 g/dL     Respiratory Rate 15         Weight:       07/05/24  0950   Weight: 74.8 kg (165 lb)       Intake and Output  No intake or output data in the 24 hours ending 07/05/24 1559    Diet:   Dietary Orders (From admission, onward)       Start     Ordered    07/05/24 1553  Diet: Cardiac;  Healthy Heart (2-3 Na+); Fluid Consistency: Thin (IDDSI 0)  Diet Effective Now        References:    Diet Order [facility]   Question Answer Comment   Diets: Cardiac    Cardiac Diet: Healthy Heart (2-3 Na+)    Fluid Consistency: Thin (IDDSI 0)        07/05/24 1552                     Most recent vitals:   Vitals:    07/05/24 1131 07/05/24 1154 07/05/24 1303 07/05/24 1455   BP: 135/73 144/84 108/60 102/58   Pulse: 109 (!) 123 92 70   Resp: 14 13 14 15   Temp:       TempSrc:       SpO2: 93% 95% 96% 95%   Weight:       Height:           Active LDAs/IV Access:   Lines, Drains & Airways       Active LDAs       Name Placement date Placement time Site Days    Peripheral IV 07/05/24 0952 Left;Posterior Forearm 07/05/24  0952  Forearm  less than 1    Peripheral IV 07/05/24 1035 Right Antecubital 07/05/24  1035  Antecubital  less than 1                    Skin Condition:   Skin Assessments (last day)       None             Labs (abnormal labs have a star):   Labs Reviewed   COMPREHENSIVE METABOLIC PANEL - Abnormal; Notable for the following components:       Result Value    Glucose 106 (*)     BUN 26 (*)     Creatinine 1.15 (*)     CO2 19.5 (*)     eGFR 47.4 (*)     All other components within normal limits    Narrative:     GFR Normal >60  Chronic Kidney Disease <60  Kidney Failure <15    The GFR formula is only valid for adults with stable renal function between ages 18 and 70.   BNP (IN-HOUSE) - Abnormal; Notable for the following components:    proBNP 10,939.0 (*)     All other components within normal limits    Narrative:     This assay is used as an aid in the diagnosis of individuals suspected of having heart failure. It can be used as an aid in the diagnosis of acute decompensated heart failure (ADHF) in patients presenting with signs and symptoms of ADHF to the emergency department (ED). In addition, NT-proBNP of <300 pg/mL indicates ADHF is not likely.    Age Range Result Interpretation  NT-proBNP Concentration  (pg/mL:      <50             Positive            >450                   Gray                 300-450                    Negative             <300    50-75           Positive            >900                  Gray                300-900                  Negative            <300      >75             Positive            >1800                  Gray                300-1800                  Negative            <300   TROPONIN - Abnormal; Notable for the following components:    HS Troponin T 18 (*)     All other components within normal limits    Narrative:     High Sensitive Troponin T Reference Range:  <14.0 ng/L- Negative Female for AMI  <22.0 ng/L- Negative Male for AMI  >=14 - Abnormal Female indicating possible myocardial injury.  >=22 - Abnormal Male indicating possible myocardial injury.   Clinicians would have to utilize clinical acumen, EKG, Troponin, and serial changes to determine if it is an Acute Myocardial Infarction or myocardial injury due to an underlying chronic condition.        CBC WITH AUTO DIFFERENTIAL - Abnormal; Notable for the following components:    Hemoglobin 9.1 (*)     Hematocrit 29.8 (*)     MCV 78.8 (*)     MCH 24.1 (*)     MCHC 30.5 (*)     RDW 17.3 (*)     All other components within normal limits   HIGH SENSITIVITIY TROPONIN T 2HR - Abnormal; Notable for the following components:    HS Troponin T 17 (*)     All other components within normal limits    Narrative:     High Sensitive Troponin T Reference Range:  <14.0 ng/L- Negative Female for AMI  <22.0 ng/L- Negative Male for AMI  >=14 - Abnormal Female indicating possible myocardial injury.  >=22 - Abnormal Male indicating possible myocardial injury.   Clinicians would have to utilize clinical acumen, EKG, Troponin, and serial changes to determine if it is an Acute Myocardial Infarction or myocardial injury due to an underlying chronic condition.        D-DIMER, QUANTITATIVE - Normal    Narrative:     According to the assay  "'s published package insert, a normal (<0.50 mg/L (FEU)) D-dimer result in conjunction with a non-high clinical probability assessment, excludes deep vein thrombosis (DVT) and pulmonary embolism (PE) with high sensitivity.    D-dimer values increase with age and this can make VTE exclusion of an older population difficult. To address this, the [employer], based on best available evidence and recent guidelines, recommends that clinicians use age-adjusted D-dimer thresholds in patients greater than 50 years of age with: a) a low probability of PE who do not meet all Pulmonary Embolism Rule Out Criteria, or b) in those with intermediate probability of PE.   The formula for an age-adjusted D-dimer cut-off is \"age/100\".  For example, a 60 year old patient would have an age-adjusted cut-off of 0.60 mg/L (FEU) and an 80 year old 0.80 mg/L (FEU).   MAGNESIUM - Normal   RAINBOW DRAW    Narrative:     The following orders were created for panel order [facility] Draw.  Procedure                               Abnormality         Status                     ---------                               -----------         ------                     Green Top (Gel)[[id number]]                                  Final result               Lavender Top[[id number]]                                     Final result               Light Blue Top[[id number]]                                   Final result                 Please view results for these tests on the individual orders.   GREEN TOP   LAVENDER TOP   LIGHT BLUE TOP   CBC AND DIFFERENTIAL    Narrative:     The following orders were created for panel order CBC & Differential.  Procedure                               Abnormality         Status                     ---------                               -----------         ------                     CBC Auto Differential[[id number]]        Abnormal            Final result                 Please view results for these " tests on the individual orders.       LOC: Person, Place, Time, and Situation    Telemetry:  Telemetry    Cardiac Monitoring Ordered: yes    EKG:   ECG 12 Lead Tachycardia   Preliminary Result   HEART VVSA=198  bpm   RR Qgqagdlt=508  ms   NH Interval=  ms   P Horizontal Axis=  deg   P Front Axis=  deg   QRSD Interval=70  ms   QT Aklfvqzk=280  ms   NIbS=968  ms   QRS Axis=46  deg   T Wave Axis=121  deg   - ABNORMAL ECG -   Atrial fibrillation   Anteroseptal infarct, age indeterminate   Abnormal T, consider ischemia, lateral leads   Prolonged QT interval   Date and Time of Study:2024-07-05 09:59:47          Medications Given in the ED:   Medications   sodium chloride 0.9 % flush 10 mL (has no administration in time range)   sodium chloride 0.9 % flush 10 mL (has no administration in time range)   dilTIAZem (CARDIZEM) 125 mg in 125 mL D5W infusion (15 mg/hr Intravenous Rate/Dose Change 7/5/24 3767)   nitroglycerin (NITROSTAT) SL tablet 0.4 mg (has no administration in time range)   Potassium Replacement - Follow Nurse / BPA Driven Protocol (has no administration in time range)   Magnesium Standard Dose Replacement - Follow Nurse / BPA Driven Protocol (has no administration in time range)   Phosphorus Replacement - Follow Nurse / BPA Driven Protocol (has no administration in time range)   Calcium Replacement - Follow Nurse / BPA Driven Protocol (has no administration in time range)   acetaminophen (TYLENOL) tablet 650 mg (has no administration in time range)     Or   acetaminophen (TYLENOL) 160 MG/5ML oral solution 650 mg (has no administration in time range)     Or   acetaminophen (TYLENOL) suppository 650 mg (has no administration in time range)   sennosides-docusate (PERICOLACE) 8.6-50 MG per tablet 2 tablet (has no administration in time range)     And   polyethylene glycol (MIRALAX) packet 17 g (has no administration in time range)     And   bisacodyl (DULCOLAX) EC tablet 5 mg (has no administration in time range)      And   bisacodyl (DULCOLAX) suppository 10 mg (has no administration in time range)   melatonin tablet 5 mg (has no administration in time range)   ondansetron ODT (ZOFRAN-ODT) disintegrating tablet 4 mg (has no administration in time range)     Or   ondansetron (ZOFRAN) injection 4 mg (has no administration in time range)   apixaban (ELIQUIS) tablet 5 mg (has no administration in time range)   dilTIAZem (CARDIZEM) bolus from bag 1 mg/mL solution 10 mg (10 mg Intravenous Bolus from Bag 7/5/24 1025)   HYDROmorphone (DILAUDID) injection 0.5 mg (0.5 mg Intravenous Given 7/5/24 1019)   ondansetron (ZOFRAN) injection 4 mg (4 mg Intravenous Given 7/5/24 1018)   metoprolol tartrate (LOPRESSOR) injection 5 mg (5 mg Intravenous Given 7/5/24 1249)       Imaging results:  XR Sacrum & Coccyx    Result Date: 7/5/2024  Impression: 1. No evidence of acute L-spine fracture or subluxation. Stable degenerative changes in spondylolisthesis. 2. No evidence of displaced sacrococcygeal fracture. Electronically Signed: Quan Cazares MD  7/5/2024 11:54 AM EDT  Workstation ID: [id number]    XR Spine Lumbar Complete 4+VW    Result Date: 7/5/2024  Impression: 1. No evidence of acute L-spine fracture or subluxation. Stable degenerative changes in spondylolisthesis. 2. No evidence of displaced sacrococcygeal fracture. Electronically Signed: Quan Cazares MD  7/5/2024 11:54 AM EDT  Workstation ID: [id number]    XR Chest 1 View    Result Date: 7/5/2024  Impression: Cardiomegaly with pulmonary vascular congestion and mild interstitial edema. No focal consolidation or pleural disease. Electronically Signed: Lee Paris MD  7/5/2024 11:43 AM EDT  Workstation ID: [id number]    XR Shoulder 2+ View Right    Result Date: 7/5/2024  Impression: Degenerative changes with no definite acute osseous abnormality noted Electronically Signed: Cesar Arzate MD  7/5/2024 11:42 AM EDT  Workstation ID: [id number]     Social issues:   Social History     Socioeconomic  History    Marital status:    Tobacco Use    Smoking status: Never    Smokeless tobacco: Never   Vaping Use    Vaping status: Never Used   Substance and Sexual Activity    Alcohol use: No    Drug use: Never    Sexual activity: Defer       NIH Stroke Scale:  Interval: (not recorded)  1a. Level of Consciousness: (not recorded)  1b. LOC Questions: (not recorded)  1c. LOC Commands: (not recorded)  2. Best Gaze: (not recorded)  3. Visual: (not recorded)  4. Facial Palsy: (not recorded)  5a. Motor Arm, Left: (not recorded)  5b. Motor Arm, Right: (not recorded)  6a. Motor Leg, Left: (not recorded)  6b. Motor Leg, Right: (not recorded)  7. Limb Ataxia: (not recorded)  8. Sensory: (not recorded)  9. Best Language: (not recorded)  10. Dysarthria: (not recorded)  11. Extinction and Inattention (formerly Neglect): (not recorded)    Total (NIH Stroke Scale): (not recorded)     Additional notable assessment information:     Nursing report ED to floor:  2D    Destinee Iyer RN   07/05/24 15:59 EDT

## 2024-07-06 ENCOUNTER — APPOINTMENT (OUTPATIENT)
Dept: CARDIOLOGY | Facility: HOSPITAL | Age: 84
End: 2024-07-06
Payer: MEDICARE

## 2024-07-06 LAB
ANION GAP SERPL CALCULATED.3IONS-SCNC: 11.3 MMOL/L (ref 5–15)
ANISOCYTOSIS BLD QL: ABNORMAL
BH CV ECHO LEFT VENTRICLE GLOBAL LONGITUDINAL STRAIN: -7.6 %
BH CV ECHO MEAS - ACS: 2 CM
BH CV ECHO MEAS - AI P1/2T: 473.9 MSEC
BH CV ECHO MEAS - AO MAX PG: 3.6 MMHG
BH CV ECHO MEAS - AO MEAN PG: 2 MMHG
BH CV ECHO MEAS - AO ROOT DIAM: 3 CM
BH CV ECHO MEAS - AO V2 MAX: 95.4 CM/SEC
BH CV ECHO MEAS - AO V2 VTI: 15.3 CM
BH CV ECHO MEAS - AVA(I,D): 2.25 CM2
BH CV ECHO MEAS - EDV(CUBED): 64 ML
BH CV ECHO MEAS - EDV(MOD-SP4): 55.8 ML
BH CV ECHO MEAS - EF(MOD-BP): 40 %
BH CV ECHO MEAS - EF(MOD-SP4): 39.8 %
BH CV ECHO MEAS - ESV(CUBED): 39.3 ML
BH CV ECHO MEAS - ESV(MOD-SP4): 33.6 ML
BH CV ECHO MEAS - FS: 15 %
BH CV ECHO MEAS - IVS/LVPW: 0.83 CM
BH CV ECHO MEAS - IVSD: 1 CM
BH CV ECHO MEAS - LA DIMENSION: 4.8 CM
BH CV ECHO MEAS - LV DIASTOLIC VOL/BSA (35-75): 31.3 CM2
BH CV ECHO MEAS - LV MASS(C)D: 145.6 GRAMS
BH CV ECHO MEAS - LV MAX PG: 1.92 MMHG
BH CV ECHO MEAS - LV MEAN PG: 1 MMHG
BH CV ECHO MEAS - LV SYSTOLIC VOL/BSA (12-30): 18.9 CM2
BH CV ECHO MEAS - LV V1 MAX: 69.2 CM/SEC
BH CV ECHO MEAS - LV V1 VTI: 15.2 CM
BH CV ECHO MEAS - LVIDD: 4 CM
BH CV ECHO MEAS - LVIDS: 3.4 CM
BH CV ECHO MEAS - LVOT AREA: 2.27 CM2
BH CV ECHO MEAS - LVOT DIAM: 1.7 CM
BH CV ECHO MEAS - LVPWD: 1.2 CM
BH CV ECHO MEAS - MR MAX PG: 83.9 MMHG
BH CV ECHO MEAS - MR MAX VEL: 458 CM/SEC
BH CV ECHO MEAS - MV E MAX VEL: 104 CM/SEC
BH CV ECHO MEAS - PA ACC TIME: 0.13 SEC
BH CV ECHO MEAS - PA V2 MAX: 64.7 CM/SEC
BH CV ECHO MEAS - RAP SYSTOLE: 15 MMHG
BH CV ECHO MEAS - RV MAX PG: 0.8 MMHG
BH CV ECHO MEAS - RV V1 MAX: 44.7 CM/SEC
BH CV ECHO MEAS - RV V1 VTI: 9.7 CM
BH CV ECHO MEAS - RVSP: 52.7 MMHG
BH CV ECHO MEAS - SV(LVOT): 34.5 ML
BH CV ECHO MEAS - SV(MOD-SP4): 22.2 ML
BH CV ECHO MEAS - SVI(LVOT): 19.4 ML/M2
BH CV ECHO MEAS - SVI(MOD-SP4): 12.5 ML/M2
BH CV ECHO MEAS - TAPSE (>1.6): 1.42 CM
BH CV ECHO MEAS - TR MAX PG: 37.7 MMHG
BH CV ECHO MEAS - TR MAX VEL: 307 CM/SEC
BH CV XLRA - RV BASE: 3.8 CM
BH CV XLRA - RV MID: 2.8 CM
BUN SERPL-MCNC: 23 MG/DL (ref 8–23)
BUN/CREAT SERPL: 21.5 (ref 7–25)
CALCIUM SPEC-SCNC: 8.6 MG/DL (ref 8.6–10.5)
CHLORIDE SERPL-SCNC: 106 MMOL/L (ref 98–107)
CHOLEST SERPL-MCNC: 107 MG/DL (ref 0–200)
CO2 SERPL-SCNC: 20.7 MMOL/L (ref 22–29)
CREAT SERPL-MCNC: 1.07 MG/DL (ref 0.57–1)
DEPRECATED RDW RBC AUTO: 51.7 FL (ref 37–54)
EGFRCR SERPLBLD CKD-EPI 2021: 51.6 ML/MIN/1.73
EOSINOPHIL # BLD MANUAL: 0.53 10*3/MM3 (ref 0–0.4)
EOSINOPHIL NFR BLD MANUAL: 8 % (ref 0.3–6.2)
ERYTHROCYTE [DISTWIDTH] IN BLOOD BY AUTOMATED COUNT: 17.5 % (ref 12.3–15.4)
FERRITIN SERPL-MCNC: 37 NG/ML (ref 13–150)
GLUCOSE SERPL-MCNC: 90 MG/DL (ref 65–99)
HCT VFR BLD AUTO: 29.3 % (ref 34–46.6)
HDLC SERPL-MCNC: 42 MG/DL (ref 40–60)
HGB BLD-MCNC: 8.5 G/DL (ref 12–15.9)
IRON 24H UR-MRATE: 24 MCG/DL (ref 37–145)
IRON SATN MFR SERPL: 5 % (ref 20–50)
LDLC SERPL CALC-MCNC: 49 MG/DL (ref 0–100)
LDLC/HDLC SERPL: 1.17 {RATIO}
LEFT ATRIUM VOLUME INDEX: 41.2 ML/M2
LYMPHOCYTES # BLD MANUAL: 2.83 10*3/MM3 (ref 0.7–3.1)
LYMPHOCYTES NFR BLD MANUAL: 2 % (ref 5–12)
MCH RBC QN AUTO: 23.5 PG (ref 26.6–33)
MCHC RBC AUTO-ENTMCNC: 29 G/DL (ref 31.5–35.7)
MCV RBC AUTO: 80.9 FL (ref 79–97)
MONOCYTES # BLD: 0.13 10*3/MM3 (ref 0.1–0.9)
NEUTROPHILS # BLD AUTO: 3.1 10*3/MM3 (ref 1.7–7)
NEUTROPHILS NFR BLD MANUAL: 47 % (ref 42.7–76)
PLAT MORPH BLD: NORMAL
PLATELET # BLD AUTO: 231 10*3/MM3 (ref 140–450)
PMV BLD AUTO: 10.7 FL (ref 6–12)
POIKILOCYTOSIS BLD QL SMEAR: ABNORMAL
POTASSIUM SERPL-SCNC: 4 MMOL/L (ref 3.5–5.2)
QT INTERVAL: 340 MS
QTC INTERVAL: 503 MS
RBC # BLD AUTO: 3.62 10*6/MM3 (ref 3.77–5.28)
SCAN SLIDE: NORMAL
SINUS: 3 CM
SODIUM SERPL-SCNC: 138 MMOL/L (ref 136–145)
T4 FREE SERPL-MCNC: 1.12 NG/DL (ref 0.93–1.7)
TIBC SERPL-MCNC: 487 MCG/DL (ref 298–536)
TRANSFERRIN SERPL-MCNC: 327 MG/DL (ref 200–360)
TRIGL SERPL-MCNC: 80 MG/DL (ref 0–150)
TSH SERPL DL<=0.05 MIU/L-ACNC: 5.75 UIU/ML (ref 0.27–4.2)
VARIANT LYMPHS NFR BLD MANUAL: 2 % (ref 0–5)
VARIANT LYMPHS NFR BLD MANUAL: 41 % (ref 19.6–45.3)
VLDLC SERPL-MCNC: 16 MG/DL (ref 5–40)
WBC MORPH BLD: NORMAL
WBC NRBC COR # BLD AUTO: 6.59 10*3/MM3 (ref 3.4–10.8)

## 2024-07-06 PROCEDURE — 85007 BL SMEAR W/DIFF WBC COUNT: CPT

## 2024-07-06 PROCEDURE — 80048 BASIC METABOLIC PNL TOTAL CA: CPT

## 2024-07-06 PROCEDURE — 82728 ASSAY OF FERRITIN: CPT

## 2024-07-06 PROCEDURE — 84466 ASSAY OF TRANSFERRIN: CPT

## 2024-07-06 PROCEDURE — 83540 ASSAY OF IRON: CPT

## 2024-07-06 PROCEDURE — 84439 ASSAY OF FREE THYROXINE: CPT

## 2024-07-06 PROCEDURE — 93356 MYOCRD STRAIN IMG SPCKL TRCK: CPT

## 2024-07-06 PROCEDURE — 93306 TTE W/DOPPLER COMPLETE: CPT

## 2024-07-06 PROCEDURE — 25010000002 FUROSEMIDE PER 20 MG

## 2024-07-06 PROCEDURE — 25810000003 SODIUM CHLORIDE 0.9 % SOLUTION

## 2024-07-06 PROCEDURE — 25010000002 NA FERRIC GLUC CPLX PER 12.5 MG

## 2024-07-06 PROCEDURE — 93306 TTE W/DOPPLER COMPLETE: CPT | Performed by: INTERNAL MEDICINE

## 2024-07-06 PROCEDURE — 85025 COMPLETE CBC W/AUTO DIFF WBC: CPT

## 2024-07-06 PROCEDURE — 97162 PT EVAL MOD COMPLEX 30 MIN: CPT

## 2024-07-06 PROCEDURE — 93356 MYOCRD STRAIN IMG SPCKL TRCK: CPT | Performed by: INTERNAL MEDICINE

## 2024-07-06 PROCEDURE — 80061 LIPID PANEL: CPT

## 2024-07-06 PROCEDURE — 84443 ASSAY THYROID STIM HORMONE: CPT

## 2024-07-06 RX ORDER — FUROSEMIDE 10 MG/ML
40 INJECTION INTRAMUSCULAR; INTRAVENOUS
Status: DISCONTINUED | OUTPATIENT
Start: 2024-07-06 | End: 2024-07-09

## 2024-07-06 RX ADMIN — HYDROCODONE BITARTRATE AND ACETAMINOPHEN 1 TABLET: 5; 325 TABLET ORAL at 18:41

## 2024-07-06 RX ADMIN — HYDROCODONE BITARTRATE AND ACETAMINOPHEN 1 TABLET: 5; 325 TABLET ORAL at 10:54

## 2024-07-06 RX ADMIN — Medication 5 MG: at 19:42

## 2024-07-06 RX ADMIN — APIXABAN 5 MG: 5 TABLET, FILM COATED ORAL at 09:33

## 2024-07-06 RX ADMIN — FUROSEMIDE 40 MG: 10 INJECTION, SOLUTION INTRAMUSCULAR; INTRAVENOUS at 17:18

## 2024-07-06 RX ADMIN — ROPINIROLE HYDROCHLORIDE 0.25 MG: 0.25 TABLET, FILM COATED ORAL at 19:42

## 2024-07-06 RX ADMIN — Medication 5 MG/HR: at 14:04

## 2024-07-06 RX ADMIN — APIXABAN 5 MG: 5 TABLET, FILM COATED ORAL at 19:42

## 2024-07-06 RX ADMIN — HYDROCODONE BITARTRATE AND ACETAMINOPHEN 1 TABLET: 5; 325 TABLET ORAL at 03:56

## 2024-07-06 RX ADMIN — SODIUM CHLORIDE 250 MG: 9 INJECTION, SOLUTION INTRAVENOUS at 16:10

## 2024-07-06 NOTE — PROGRESS NOTES
[facility] MEDICINE SERVICE  DAILY PROGRESS NOTE    NAME: Catalina Moreno  : 1940  MRN: [id number]      LOS: 1 day     PROVIDER OF SERVICE: Zuleima Canales MD    Chief Complaint: Atrial fibrillation with RVR    Subjective:     Interval History:  History taken from: patient  Patient Complaints: States she has been having palpitations since a few days, denies any chest pain or shortness of breath.  Patient Denies:      Review of Systems: Negative except as described above  Review of Systems    Objective:     Vital Signs  Temp:  [97.2 °F (36.2 °C)-97.5 °F (36.4 °C)] 97.5 °F (36.4 °C)  Heart Rate:  [] 72  Resp:  [13-18] 18  BP: ()/() 103/74  Flow (L/min):  [1.5-3] 1.5   Body mass index is 29.23 kg/m².    Physical Exam   Physical Exam  Eyes:      Pupils: Pupils are equal, round, and reactive to light.   Cardiovascular:      Rate and Rhythm: Normal rate. Rhythm irregular.   Pulmonary:      Breath sounds: Normal breath sounds.   Abdominal:      General: Bowel sounds are normal.   Musculoskeletal:         General: Normal range of motion.   Neurological:      General: No focal deficit present.      Mental Status: She is alert and oriented to person, place, and time.         Scheduled Meds   apixaban, 5 mg, Oral, Q12H  rOPINIRole, 0.25 mg, Oral, Nightly       PRN Meds     acetaminophen **OR** acetaminophen **OR** acetaminophen    senna-docusate sodium **AND** polyethylene glycol **AND** bisacodyl **AND** bisacodyl    Calcium Replacement - Follow Nurse / BPA Driven Protocol    HYDROcodone-acetaminophen    Magnesium Standard Dose Replacement - Follow Nurse / BPA Driven Protocol    melatonin    nitroglycerin    ondansetron ODT **OR** ondansetron    Phosphorus Replacement - Follow Nurse / BPA Driven Protocol    Potassium Replacement - Follow Nurse / BPA Driven Protocol    sodium chloride    [COMPLETED] Insert Peripheral IV **AND** sodium chloride   Infusions  dilTIAZem, 5-15 mg/hr,  Last Rate: 5 mg/hr (07/06/24 1404)          Diagnostic Data    Results from last 7 days   Lab Units 07/06/24  0358 07/05/24  0953   WBC 10*3/mm3 6.59 6.89   HEMOGLOBIN g/dL 8.5* 9.1*   HEMATOCRIT % 29.3* 29.8*   PLATELETS 10*3/mm3 231 254   GLUCOSE mg/dL 90 106*   CREATININE mg/dL 1.07* 1.15*   BUN mg/dL 23 26*   SODIUM mmol/L 138 137   POTASSIUM mmol/L 4.0 4.4   AST (SGOT) U/L  --  24   ALT (SGPT) U/L  --  9   ALK PHOS U/L  --  90   BILIRUBIN mg/dL  --  1.2   ANION GAP mmol/L 11.3 14.5       XR Sacrum & Coccyx    Result Date: 7/5/2024  Impression: 1. No evidence of acute L-spine fracture or subluxation. Stable degenerative changes in spondylolisthesis. 2. No evidence of displaced sacrococcygeal fracture. Electronically Signed: Quan Cazares MD  7/5/2024 11:54 AM EDT  Workstation ID: [id number]    XR Spine Lumbar Complete 4+VW    Result Date: 7/5/2024  Impression: 1. No evidence of acute L-spine fracture or subluxation. Stable degenerative changes in spondylolisthesis. 2. No evidence of displaced sacrococcygeal fracture. Electronically Signed: Quan Cazares MD  7/5/2024 11:54 AM EDT  Workstation ID: [id number]    XR Chest 1 View    Result Date: 7/5/2024  Impression: Cardiomegaly with pulmonary vascular congestion and mild interstitial edema. No focal consolidation or pleural disease. Electronically Signed: Lee Paris MD  7/5/2024 11:43 AM EDT  Workstation ID: [id number]    XR Shoulder 2+ View Right    Result Date: 7/5/2024  Impression: Degenerative changes with no definite acute osseous abnormality noted Electronically Signed: Cesar Arzate MD  7/5/2024 11:42 AM EDT  Workstation ID: [id number]       I reviewed the patient's new clinical results.    Assessment/Plan:     Active and Resolved Problems  Active Hospital Problems    Diagnosis  POA    **Atrial fibrillation with RVR [I48.91]  Yes      Resolved Hospital Problems   No resolved problems to display.       Atrial fibrillation with RVR  -On Cardizem, rate now  80-90  -BP stable   -Chadsvasc 3+ Eliquis continued  -Cardiology consulted     H/O HFrEF  - BNP 10,939  - last echo 9/7/23 with EF 51-55%  - repeat echo pending  - CXR shows cardiomegaly with pulmonary vascular congestion, will administer a few doses of Lasix however will need to be cautious of blood pressure   -monitor I's and O's       Frequent Falls  - lives alone, no family for support  - frequent falls and admissions  - patient open to discussing assisted living facility   -PT/OT/CM consulted      Iron deficiency anemia  - Iron panel reviewed, will start patient on IV iron supplementation and occult blood ordered  - Transfuse if <7      H/O Hypothyroidism   - TSH 5.75, free T4 ordered  - may need endocrinology consult  - patient denies taking synthroid       VTE Prophylaxis:  Pharmacologic VTE prophylaxis orders are present.         Code status is   Code Status and Medical Interventions:   Ordered at: 07/05/24 1342     Level Of Support Discussed With:    Patient     Code Status (Patient has no pulse and is not breathing):    CPR (Attempt to Resuscitate)     Medical Interventions (Patient has pulse or is breathing):    Full Support         Time: 30 minutes    Signature: Electronically signed by Zuleima Canales MD, 07/06/24, 14:53 EDT.  [facility] Hospitalist Team

## 2024-07-06 NOTE — THERAPY EVALUATION
Patient Name: Catalina Moreno  : 1940    MRN: [id number]                              Today's Date: 2024       Admit Date: 2024    Visit Dx:     ICD-10-CM ICD-9-CM   1. Fall, initial encounter  W19.XXXA E888.9   2. Sacral back pain  M53.3 724.6   3. Right shoulder pain, unspecified chronicity  M25.511 719.41   4. Atrial fibrillation with RVR  I48.91 427.31   5. ARIELA (acute kidney injury)  N17.9 584.9     Patient Active Problem List   Diagnosis    Arthritis    Depressive disorder    Primary fibromyalgia syndrome    Hypothyroidism    Atrial fibrillation with RVR    Stage 3a chronic kidney disease    Chronic low back pain    Age-related cognitive decline    Generalized anxiety disorder    Polyneuropathy, unspecified    Restless legs syndrome    Paroxysmal atrial fibrillation    Essential (primary) hypertension    Gastro-esophageal reflux disease without esophagitis    Pulmonary hypertension, unspecified    Coronary artery disease involving native coronary artery of native heart without angina pectoris    Presence of cardiac pacemaker    Chronic HFrEF (heart failure with reduced ejection fraction)    Atrial flutter with rapid ventricular response    Weakness    AF (atrial fibrillation)    Chronic heart failure with preserved ejection fraction (HFpEF)    Fall    Nasal bones, closed fracture     Past Medical History:   Diagnosis Date    Acquired spondylolisthesis of lumbosacral region     Acute kidney injury 2022    Anxiety     Arthritis     Atrial fibrillation     paroxysmal    Broken shoulder     left-- due to fall 20 was at [facility]    Chronic pain disorder     Chronic systolic CHF (congestive heart failure) 2023    EF 36-40%    Coronary artery disease involving native coronary artery of native heart without angina pectoris 2021    nonobstructive    DDD (degenerative disc disease), cervical     DDD (degenerative disc disease), lumbar     Depression     Edema     2020 foot    GERD  (gastroesophageal reflux disease)     H/O fall     Heart failure with mid-range ejection fraction 03/05/2022    EF 45% per 2D echo    Hypertension     Hypothyroidism     Insomnia     Low back pain     Moderate mitral regurgitation 01/05/2023    Neuropathy     Nonrheumatic tricuspid valve regurgitation     Pain in both feet     Polypharmacy     PONV (postoperative nausea and vomiting)     Pulmonary hypertension     Radiculopathy     Restless legs     Sacroiliitis     Sick sinus syndrome     Added automatically from request for surgery [id number]    Spondylolisthesis     Stage 3a chronic kidney disease     Urinary incontinence     Vitamin D deficiency      Past Surgical History:   Procedure Laterality Date    BACK SURGERY  07/19/2018    PDC &  PSF L3-L5 insitu    CARDIAC CATHETERIZATION N/A 4/2/2021    Procedure: Cardiac Catheterization/Vascular Study;  Surgeon: Barrett Evans MD;  Location: [facility] CATH INVASIVE LOCATION;  Service: Cardiovascular;  Laterality: N/A;    CARDIAC ELECTROPHYSIOLOGY PROCEDURE N/A 1/17/2023    Procedure: Pacemaker DC new;  Surgeon: Baljeet Valero MD;  Location: [facility] CATH INVASIVE LOCATION;  Service: Cardiovascular;  Laterality: N/A;    CHOLECYSTECTOMY      COLONOSCOPY      ENDOSCOPY N/A 9/14/2023    Procedure: ESOPHAGOGASTRODUODENOSCOPY;  Surgeon: Ulysses Lamas MD;  Location: [facility] ENDOSCOPY;  Service: Gastroenterology;  Laterality: N/A;  gastritis, inflammatory gastric polyp    HYSTERECTOMY      ROTATOR CUFF REPAIR Left       General Information       Row Name 07/06/24 1238          Physical Therapy Time and Intention    Document Type evaluation  -CL     Mode of Treatment individual therapy;physical therapy  -CL       Row Name 07/06/24 1238          General Information    Prior Level of Function independent:;ADL's;driving;all household mobility;community mobility  -CL     Existing Precautions/Restrictions fall  -CL     Barriers to Rehab previous functional  deficit;impaired sensation  -CL       Row Name 07/06/24 1238          Living Environment    People in Home alone  -CL       Row Name 07/06/24 1238          Home Main Entrance    Number of Stairs, Main Entrance none  -CL       Row Name 07/06/24 1238          Stairs Within Home, Primary    Number of Stairs, Within Home, Primary none  -CL       Row Name 07/06/24 1238          Cognition    Orientation Status (Cognition) oriented x 4  -CL       Row Name 07/06/24 1238          Safety Issues, Functional Mobility    Safety Issues Affecting Function (Mobility) at risk behavior observed  -CL     Impairments Affecting Function (Mobility) balance  -CL     Comment, Safety Issues/Impairments (Mobility) Pt reports decreased sensation/neuropathy bilateral shins to toes but reports that she is an active   -CL               User Key  (r) = Recorded By, (t) = Taken By, (c) = Cosigned By      Initials Name Provider Type    Tiffany Peace, PT Physical Therapist                   Mobility       Row Name 07/06/24 1240          Bed Mobility    Bed Mobility supine-sit  -CL     Supine-Sit [place] (Bed Mobility) standby assist  -CL     Assistive Device (Bed Mobility) bed rails  -CL       Row Name 07/06/24 1240          Bed-Chair Transfer    Bed-Chair [place] (Transfers) minimum assist (75% patient effort)  -CL     Assistive Device (Bed-Chair Transfers) walker, front-wheeled  -CL       Row Name 07/06/24 1240          Sit-Stand Transfer    Sit-Stand [place] (Transfers) minimum assist (75% patient effort)  -CL     Assistive Device (Sit-Stand Transfers) walker, front-wheeled  -CL     Comment, (Sit-Stand Transfer) c/o LBP with transfers  -CL       Row Name 07/06/24 1240          Gait/Stairs (Locomotion)    [place] Level (Gait) contact guard  -CL     Assistive Device (Gait) walker, front-wheeled  -CL     Patient was able to Ambulate yes  -CL     Distance in Feet (Gait) 10  -CL               User Key  (r) = Recorded  By, (t) = Taken By, (c) = Cosigned By      Initials Name Provider Type    Tiffany Peace PT Physical Therapist                   Obj/Interventions       Row Name 07/06/24 1241          Range of Motion Comprehensive    General Range of Motion bilateral lower extremity ROM WFL  -CL       Row Name 07/06/24 1241          Strength Comprehensive (MMT)    Comment, General Manual Muscle Testing (MMT) Assessment Limited by c/o 'sore all over'  -CL       Row Name 07/06/24 1241          Balance    Balance Assessment sitting static balance;sitting dynamic balance;standing dynamic balance;standing static balance  -CL     Static Sitting Balance independent  -CL     Dynamic Sitting Balance contact guard  -CL     Position, Sitting Balance sitting edge of bed  -CL     Static Standing Balance contact guard  -CL     Dynamic Standing Balance minimal assist  -CL     Position/Device Used, Standing Balance supported;walker, front-wheeled  -CL       Row Name 07/06/24 1241          Sensory Assessment (Somatosensory)    Sensory Assessment (Somatosensory) bilateral LE  -CL     Bilateral LE Sensory Assessment impaired  -CL               User Key  (r) = Recorded By, (t) = Taken By, (c) = Cosigned By      Initials Name Provider Type    Tiffany Peace PT Physical Therapist                   Goals/Plan       Row Name 07/06/24 1248          Bed Mobility Goal 1 (PT)    Activity/Assistive Device (Bed Mobility Goal 1, PT) bed mobility activities, all  -CL     [place] Level/Cues Needed (Bed Mobility Goal 1, PT) independent  -CL     Time Frame (Bed Mobility Goal 1, PT) long term goal (LTG);2 weeks  -CL       Row Name 07/06/24 1248          Transfer Goal 1 (PT)    Activity/Assistive Device (Transfer Goal 1, PT) sit-to-stand/stand-to-sit;bed-to-chair/chair-to-bed  -CL     [place] Level/Cues Needed (Transfer Goal 1, PT) modified independence  -CL     Time Frame (Transfer Goal 1, PT) long term goal (LTG);2 weeks  -CL       Row Name  07/06/24 1248          Gait Training Goal 1 (PT)    Activity/Assistive Device (Gait Training Goal 1, PT) gait (walking locomotion);assistive device use  -CL     [place] Level (Gait Training Goal 1, PT) modified independence  -CL     Distance (Gait Training Goal 1, PT) 100'  -CL     Time Frame (Gait Training Goal 1, PT) long term goal (LTG);2 weeks  -CL       Row Name 07/06/24 1248          Therapy Assessment/Plan (PT)    Planned Therapy Interventions (PT) balance training;bed mobility training;gait training;patient/family education;strengthening;transfer training  -CL               User Key  (r) = Recorded By, (t) = Taken By, (c) = Cosigned By      Initials Name Provider Type    CL Tiffany Montoya, PT Physical Therapist                   Clinical Impression       Row Name 07/06/24 1241          Pain    Pretreatment Pain Rating 3/10  -CL     Posttreatment Pain Rating 3/10  -CL     Pain Location generalized  -CL     Pain Intervention(s) Repositioned;Ambulation/increased activity  -CL       Row Name 07/06/24 1241          Plan of Care Review    Plan of Care Reviewed With patient  -CL     Outcome Evaluation Catalina Moreno is an 84 y.o. female with PMH of HTN, HLD, CHF Afib, Hypothryoidism, Pacemaker for SSS and GERD who presents with SOA x 3-4 weeks with recent fall. In ED she was found to be in Afib with RVR.  X rays of sacrum, coccyx, lumbar spine and R shoulder all negative for fx.  Pt lives alone in a [facility] with no VLADIMIR.  She is typically independent for mobility with a RW and has had 3 falls this year with nasal fx and ankle sprain on previous fall in April.  She is an active  despite marked neuropathy in both feet.  At time of PT evaluation, she requires min A for sit to stand transfers and ambulates short distances with RW and min-CGA. She continues to exhibit Afib during PT evaluation with HR varying from 114-120 in sitting.  She is currently functioning below baseline.  There are safety concerns  as she lives alone, has sensory deficits and hx of 3 falls.  She may benefit from SNF for continued PT but is concerned about leaving her dog for too long.  -CL       Row Name 07/06/24 1241          Therapy Assessment/Plan (PT)    Rehab Potential (PT) good, to achieve stated therapy goals  -CL     Criteria for Skilled Interventions Met (PT) yes;skilled treatment is necessary  -CL     Therapy Frequency (PT) 5 times/wk  -CL     Predicted Duration of Therapy Intervention (PT) Until discharge  -CL       Row Name 07/06/24 1241          Vital Signs    Pre Systolic BP Rehab 108  -CL     Pre Treatment Diastolic BP 68  -CL     Intra Systolic BP Rehab 117  -CL     Intra Treatment Diastolic BP 59  -CL     Pretreatment Heart Rate (beats/min) 114  -CL     Intratreatment Heart Rate (beats/min) 120  -CL     Posttreatment Heart Rate (beats/min) 101  -CL     Pretreatment Resp Rate (breaths/min) 16  -CL     Intratreatment Resp Rate (breaths/min) 15  -CL     Posttreatment Resp Rate (breaths/min) 15  -CL     Pre SpO2 (%) 90  -CL     O2 Delivery Pre Treatment room air  Supplimental O2 on her chin  -CL     Intra SpO2 (%) 92  -CL     O2 Delivery Intra Treatment room air  -CL     Post SpO2 (%) 93  -CL     O2 Delivery Post Treatment supplemental O2  2L  -CL     Intra Patient Position Sitting  -CL     Post Patient Position Sitting  -CL       Row Name 07/06/24 1241          Positioning and Restraints    Pre-Treatment Position in bed  -CL     Post Treatment Position chair  -CL     In Chair notified nsg;reclined;call light within reach;encouraged to call for assist;exit alarm on  -CL               User Key  (r) = Recorded By, (t) = Taken By, (c) = Cosigned By      Initials Name Provider Type    Tiffany Peace, PT Physical Therapist                   Outcome Measures       Row Name 07/06/24 1248          How much help from another person do you currently need...    Turning from your back to your side while in flat bed without using  bedrails? 4  -CL     Moving from lying on back to sitting on the side of a flat bed without bedrails? 3  -CL     Moving to and from a bed to a chair (including a wheelchair)? 3  -CL     Standing up from a chair using your arms (e.g., wheelchair, bedside chair)? 3  -CL     Climbing 3-5 steps with a railing? 2  -CL     To walk in hospital room? 3  -CL     AM-PAC 6 Clicks Score (PT) 18  -CL     Highest Level of Mobility Goal 6 --> Walk 10 steps or more  -CL       Row Name 07/06/24 1248          Functional Assessment    Outcome Measure Options AM-PAC 6 Clicks Basic Mobility (PT)  -CL               User Key  (r) = Recorded By, (t) = Taken By, (c) = Cosigned By      Initials Name Provider Type    CL Tiffany Montoya, PT Physical Therapist                                 Physical Therapy Education       Title: PT OT SLP Therapies (Done)       Topic: Physical Therapy (Done)       Point: Mobility training (Done)       Learning Progress Summary             Patient Acceptance, E,TB, VU by CL at 7/6/2024 1249                                         User Key       Initials Effective Dates Name Provider Type Discipline    CL 03/02/22 -  Tiffany Montoya, PT Physical Therapist PT                  PT Recommendation and Plan  Planned Therapy Interventions (PT): balance training, bed mobility training, gait training, patient/family education, strengthening, transfer training  Plan of Care Reviewed With: patient  Outcome Evaluation: Catalina Moreno is an 84 y.o. female with PMH of HTN, HLD, CHF Afib, Hypothryoidism, Pacemaker for SSS and GERD who presents with SOA x 3-4 weeks with recent fall. In ED she was found to be in Afib with RVR.  X rays of sacrum, coccyx, lumbar spine and R shoulder all negative for fx.  Pt lives alone in a [facility] with no VLADIMIR.  She is typically independent for mobility with a RW and has had 3 falls this year with nasal fx and ankle sprain on previous fall in April.  She is an active  despite marked  neuropathy in both feet.  At time of PT evaluation, she requires min A for sit to stand transfers and ambulates short distances with RW and min-CGA. She continues to exhibit Afib during PT evaluation with HR varying from 114-120 in sitting.  She is currently functioning below baseline.  There are safety concerns as she lives alone, has sensory deficits and hx of 3 falls.  She may benefit from SNF for continued PT but is concerned about leaving her dog for too long.     Time Calculation:   PT Evaluation Complexity  History, PT Evaluation Complexity: 3 or more personal factors and/or comorbidities  Examination of Body Systems (PT Eval Complexity): total of 3 or more elements  Clinical Presentation (PT Evaluation Complexity): evolving  Clinical Decision Making (PT Evaluation Complexity): moderate complexity  Overall Complexity (PT Evaluation Complexity): moderate complexity     PT Charges       Row Name 07/06/24 1249             Time Calculation    Start Time 1000  -CL      Stop Time 1026  -CL      Time Calculation (min) 26 min  -CL      PT Received On 07/06/24  -CL      PT - Next Appointment 07/08/24  -CL      PT Goal Re-Cert Due Date 07/20/24  -CL         Time Calculation- PT    Total Timed Code Minutes- PT 0 minute(s)  -CL                User Key  (r) = Recorded By, (t) = Taken By, (c) = Cosigned By      Initials Name Provider Type    CL Tiffany Montoya, PT Physical Therapist                  Therapy Charges for Today       Code Description Service Date Service Provider Modifiers Qty    [id number] HC PT EVAL MOD COMPLEXITY 4 7/6/2024 Tiffany Montoya, PT GP 1            PT G-Codes  Outcome Measure Options: AM-PAC 6 Clicks Basic Mobility (PT)  AM-PAC 6 Clicks Score (PT): 18  PT Discharge Summary  Anticipated Discharge Disposition (PT): skilled nursing facility    Tiffany Montoya PT  7/6/2024

## 2024-07-06 NOTE — PLAN OF CARE
Goal Outcome Evaluation:              Outcome Evaluation: Patient currently on Cardizem infusing 5mg/hr. Patient HR is in the 70s but currently in afib.  Patient complaining of jonah. leg pain.  Pain medications given.  Patient currently on 1.5L O2 NC.  Patient currently resting with no complaints.  Stable at this time.

## 2024-07-06 NOTE — PLAN OF CARE
Goal Outcome Evaluation:  Plan of Care Reviewed With: patient           Outcome Evaluation: Catalina Moreno is an 84 y.o. female with PMH of HTN, HLD, CHF Afib, Hypothryoidism, Pacemaker for SSS and GERD who presents with SOA x 3-4 weeks with recent fall. In ED she was found to be in Afib with RVR.  X rays of sacrum, coccyx, lumbar spine and R shoulder all negative for fx.  Pt lives alone in a [place] with no VLADIMIR.  She is typically independent for mobility with a RW and has had 3 falls this year with nasal fx and ankle sprain on previous fall in April.  She is an active  despite marked neuropathy in both feet.  At time of PT evaluation, she requires min A for sit to stand transfers and ambulates short distances with RW and min-CGA. She continues to exhibit Afib during PT evaluation with HR varying from 114-120 in sitting.  She is currently functioning below baseline.  There are safety concerns as she lives alone, has sensory deficits and hx of 3 falls.  She may benefit from SNF for continued PT but is concerned about leaving her dog for too long.      Anticipated Discharge Disposition (PT): skilled nursing facility

## 2024-07-07 LAB
ANION GAP SERPL CALCULATED.3IONS-SCNC: 9.8 MMOL/L (ref 5–15)
BASOPHILS # BLD AUTO: 0.07 10*3/MM3 (ref 0–0.2)
BASOPHILS NFR BLD AUTO: 0.9 % (ref 0–1.5)
BUN SERPL-MCNC: 20 MG/DL (ref 8–23)
BUN/CREAT SERPL: 18.3 (ref 7–25)
CALCIUM SPEC-SCNC: 8.1 MG/DL (ref 8.6–10.5)
CHLORIDE SERPL-SCNC: 105 MMOL/L (ref 98–107)
CO2 SERPL-SCNC: 26.2 MMOL/L (ref 22–29)
CREAT SERPL-MCNC: 1.09 MG/DL (ref 0.57–1)
DEPRECATED RDW RBC AUTO: 51 FL (ref 37–54)
EGFRCR SERPLBLD CKD-EPI 2021: 50.5 ML/MIN/1.73
EOSINOPHIL # BLD AUTO: 0.35 10*3/MM3 (ref 0–0.4)
EOSINOPHIL NFR BLD AUTO: 4.5 % (ref 0.3–6.2)
ERYTHROCYTE [DISTWIDTH] IN BLOOD BY AUTOMATED COUNT: 17.9 % (ref 12.3–15.4)
GLUCOSE SERPL-MCNC: 125 MG/DL (ref 65–99)
HCT VFR BLD AUTO: 31.8 % (ref 34–46.6)
HGB BLD-MCNC: 9.6 G/DL (ref 12–15.9)
IMM GRANULOCYTES # BLD AUTO: 0.04 10*3/MM3 (ref 0–0.05)
IMM GRANULOCYTES NFR BLD AUTO: 0.5 % (ref 0–0.5)
LYMPHOCYTES # BLD AUTO: 1.61 10*3/MM3 (ref 0.7–3.1)
LYMPHOCYTES NFR BLD AUTO: 20.7 % (ref 19.6–45.3)
MAGNESIUM SERPL-MCNC: 1.7 MG/DL (ref 1.6–2.4)
MCH RBC QN AUTO: 24.2 PG (ref 26.6–33)
MCHC RBC AUTO-ENTMCNC: 30.2 G/DL (ref 31.5–35.7)
MCV RBC AUTO: 80.3 FL (ref 79–97)
MONOCYTES # BLD AUTO: 0.4 10*3/MM3 (ref 0.1–0.9)
MONOCYTES NFR BLD AUTO: 5.1 % (ref 5–12)
NEUTROPHILS NFR BLD AUTO: 5.32 10*3/MM3 (ref 1.7–7)
NEUTROPHILS NFR BLD AUTO: 68.3 % (ref 42.7–76)
NRBC BLD AUTO-RTO: 0 /100 WBC (ref 0–0.2)
PLATELET # BLD AUTO: 251 10*3/MM3 (ref 140–450)
PMV BLD AUTO: 10.3 FL (ref 6–12)
POTASSIUM SERPL-SCNC: 3.8 MMOL/L (ref 3.5–5.2)
RBC # BLD AUTO: 3.96 10*6/MM3 (ref 3.77–5.28)
SODIUM SERPL-SCNC: 141 MMOL/L (ref 136–145)
WBC NRBC COR # BLD AUTO: 7.79 10*3/MM3 (ref 3.4–10.8)

## 2024-07-07 PROCEDURE — 83735 ASSAY OF MAGNESIUM: CPT

## 2024-07-07 PROCEDURE — 25010000002 FUROSEMIDE PER 20 MG

## 2024-07-07 PROCEDURE — 97166 OT EVAL MOD COMPLEX 45 MIN: CPT

## 2024-07-07 PROCEDURE — 25010000002 AMIODARONE IN DEXTROSE 5% 360-4.14 MG/200ML-% SOLUTION: Performed by: INTERNAL MEDICINE

## 2024-07-07 PROCEDURE — 25810000003 SODIUM CHLORIDE 0.9 % SOLUTION

## 2024-07-07 PROCEDURE — 36415 COLL VENOUS BLD VENIPUNCTURE: CPT

## 2024-07-07 PROCEDURE — 80048 BASIC METABOLIC PNL TOTAL CA: CPT

## 2024-07-07 PROCEDURE — 25010000002 AMIODARONE IN DEXTROSE 5% 150-4.21 MG/100ML-% SOLUTION: Performed by: INTERNAL MEDICINE

## 2024-07-07 PROCEDURE — 97535 SELF CARE MNGMENT TRAINING: CPT

## 2024-07-07 PROCEDURE — 25010000002 NA FERRIC GLUC CPLX PER 12.5 MG

## 2024-07-07 PROCEDURE — 85025 COMPLETE CBC W/AUTO DIFF WBC: CPT

## 2024-07-07 PROCEDURE — 99223 1ST HOSP IP/OBS HIGH 75: CPT | Performed by: INTERNAL MEDICINE

## 2024-07-07 RX ORDER — AMIODARONE HYDROCHLORIDE 200 MG/1
200 TABLET ORAL EVERY 12 HOURS
Status: DISCONTINUED | OUTPATIENT
Start: 2024-07-18 | End: 2024-07-08

## 2024-07-07 RX ORDER — PREGABALIN 25 MG/1
50 CAPSULE ORAL EVERY 8 HOURS SCHEDULED
Status: DISCONTINUED | OUTPATIENT
Start: 2024-07-07 | End: 2024-07-11 | Stop reason: HOSPADM

## 2024-07-07 RX ORDER — AMIODARONE HYDROCHLORIDE 200 MG/1
200 TABLET ORAL DAILY
Status: DISCONTINUED | OUTPATIENT
Start: 2024-08-02 | End: 2024-07-08

## 2024-07-07 RX ORDER — AMIODARONE HYDROCHLORIDE 200 MG/1
200 TABLET ORAL ONCE
Status: DISCONTINUED | OUTPATIENT
Start: 2024-07-08 | End: 2024-07-08

## 2024-07-07 RX ORDER — AMIODARONE HYDROCHLORIDE 200 MG/1
200 TABLET ORAL EVERY 12 HOURS SCHEDULED
Status: DISCONTINUED | OUTPATIENT
Start: 2024-07-08 | End: 2024-07-08

## 2024-07-07 RX ADMIN — PREGABALIN 50 MG: 25 CAPSULE ORAL at 15:42

## 2024-07-07 RX ADMIN — HYDROCODONE BITARTRATE AND ACETAMINOPHEN 1 TABLET: 5; 325 TABLET ORAL at 13:40

## 2024-07-07 RX ADMIN — SODIUM CHLORIDE 250 MG: 9 INJECTION, SOLUTION INTRAVENOUS at 16:56

## 2024-07-07 RX ADMIN — ROPINIROLE HYDROCHLORIDE 0.25 MG: 0.25 TABLET, FILM COATED ORAL at 19:43

## 2024-07-07 RX ADMIN — HYDROCODONE BITARTRATE AND ACETAMINOPHEN 1 TABLET: 5; 325 TABLET ORAL at 05:13

## 2024-07-07 RX ADMIN — AMIODARONE HYDROCHLORIDE 1 MG/MIN: 1.8 INJECTION, SOLUTION INTRAVENOUS at 14:00

## 2024-07-07 RX ADMIN — HYDROCODONE BITARTRATE AND ACETAMINOPHEN 1 TABLET: 5; 325 TABLET ORAL at 19:43

## 2024-07-07 RX ADMIN — Medication 5 MG: at 19:43

## 2024-07-07 RX ADMIN — APIXABAN 5 MG: 5 TABLET, FILM COATED ORAL at 19:43

## 2024-07-07 RX ADMIN — FUROSEMIDE 40 MG: 10 INJECTION, SOLUTION INTRAMUSCULAR; INTRAVENOUS at 17:41

## 2024-07-07 RX ADMIN — FUROSEMIDE 40 MG: 10 INJECTION, SOLUTION INTRAMUSCULAR; INTRAVENOUS at 07:58

## 2024-07-07 RX ADMIN — PREGABALIN 50 MG: 25 CAPSULE ORAL at 19:43

## 2024-07-07 RX ADMIN — Medication 10 ML: at 19:44

## 2024-07-07 RX ADMIN — APIXABAN 5 MG: 5 TABLET, FILM COATED ORAL at 07:57

## 2024-07-07 RX ADMIN — AMIODARONE HYDROCHLORIDE 0.5 MG/MIN: 1.8 INJECTION, SOLUTION INTRAVENOUS at 19:45

## 2024-07-07 RX ADMIN — AMIODARONE HYDROCHLORIDE 150 MG: 1.5 INJECTION, SOLUTION INTRAVENOUS at 13:45

## 2024-07-07 NOTE — THERAPY EVALUATION
Patient Name: Catalina Moreno  : 1940    MRN: [id number]                              Today's Date: 2024       Admit Date: 2024    Visit Dx:     ICD-10-CM ICD-9-CM   1. Fall, initial encounter  W19.XXXA E888.9   2. Sacral back pain  M53.3 724.6   3. Right shoulder pain, unspecified chronicity  M25.511 719.41   4. Atrial fibrillation with RVR  I48.91 427.31   5. ARIELA (acute kidney injury)  N17.9 584.9     Patient Active Problem List   Diagnosis    Arthritis    Depressive disorder    Primary fibromyalgia syndrome    Hypothyroidism    Atrial fibrillation with RVR    Stage 3a chronic kidney disease    Chronic low back pain    Age-related cognitive decline    Generalized anxiety disorder    Polyneuropathy, unspecified    Restless legs syndrome    Paroxysmal atrial fibrillation    Essential (primary) hypertension    Gastro-esophageal reflux disease without esophagitis    Pulmonary hypertension, unspecified    Coronary artery disease involving native coronary artery of native heart without angina pectoris    Presence of cardiac pacemaker    Chronic HFrEF (heart failure with reduced ejection fraction)    Atrial flutter with rapid ventricular response    Weakness    AF (atrial fibrillation)    Chronic heart failure with preserved ejection fraction (HFpEF)    Fall    Nasal bones, closed fracture     Past Medical History:   Diagnosis Date    Acquired spondylolisthesis of lumbosacral region     Acute kidney injury 2022    Anxiety     Arthritis     Atrial fibrillation     paroxysmal    Broken shoulder     left-- due to fall 20 was at [facility]    Chronic pain disorder     Chronic systolic CHF (congestive heart failure) 2023    EF 36-40%    Coronary artery disease involving native coronary artery of native heart without angina pectoris 2021    nonobstructive    DDD (degenerative disc disease), cervical     DDD (degenerative disc disease), lumbar     Depression     Edema     2020 foot    GERD  (gastroesophageal reflux disease)     H/O fall     Heart failure with mid-range ejection fraction 03/05/2022    EF 45% per 2D echo    Hypertension     Hypothyroidism     Insomnia     Low back pain     Moderate mitral regurgitation 01/05/2023    Neuropathy     Nonrheumatic tricuspid valve regurgitation     Pain in both feet     Polypharmacy     PONV (postoperative nausea and vomiting)     Pulmonary hypertension     Radiculopathy     Restless legs     Sacroiliitis     Sick sinus syndrome     Added automatically from request for surgery [id number]    Spondylolisthesis     Stage 3a chronic kidney disease     Urinary incontinence     Vitamin D deficiency      Past Surgical History:   Procedure Laterality Date    BACK SURGERY  07/19/2018    PDC &  PSF L3-L5 insitu    CARDIAC CATHETERIZATION N/A 4/2/2021    Procedure: Cardiac Catheterization/Vascular Study;  Surgeon: Barrett Evans MD;  Location: [facility] CATH INVASIVE LOCATION;  Service: Cardiovascular;  Laterality: N/A;    CARDIAC ELECTROPHYSIOLOGY PROCEDURE N/A 1/17/2023    Procedure: Pacemaker DC new;  Surgeon: Baljeet Valero MD;  Location: [facility] CATH INVASIVE LOCATION;  Service: Cardiovascular;  Laterality: N/A;    CHOLECYSTECTOMY      COLONOSCOPY      ENDOSCOPY N/A 9/14/2023    Procedure: ESOPHAGOGASTRODUODENOSCOPY;  Surgeon: Ulysses Lamas MD;  Location: [facility] ENDOSCOPY;  Service: Gastroenterology;  Laterality: N/A;  gastritis, inflammatory gastric polyp    HYSTERECTOMY      ROTATOR CUFF REPAIR Left       General Information       Row Name 07/07/24 1536          General Information    Prior Level of Function independent:;ADL's;all household mobility;driving;home management  -     Existing Precautions/Restrictions fall  -     Barriers to Rehab previous functional deficit;environmental barriers;medically complex  pt does not want to leave her dog wohtout care and has no one to provide care for more than a few days.  -       Row Name  "07/07/24 1536          Living Environment    People in Home alone  -       Row Name 07/07/24 1536          Home Main Entrance    Number of Stairs, Main Entrance none;other (see comments)  2nd floor apartment, elevator access  -       Row Name 07/07/24 1536          Stairs Within Home, Primary    Number of Stairs, Within Home, Primary none  -       Row Name 07/07/24 1536          Cognition    Orientation Status (Cognition) oriented x 4  -       Row Name 07/07/24 1536          Safety Issues, Functional Mobility    Impairments Affecting Function (Mobility) endurance/activity tolerance;balance  uses walker at home  -               User Key  (r) = Recorded By, (t) = Taken By, (c) = Cosigned By      Initials Name Provider Type     Lorin Morlilo OT Occupational Therapist                     Mobility/ADL's       Row Name 07/07/24 1538          Bed Mobility    Bed Mobility supine-sit  -     Supine-Sit Isabela (Bed Mobility) standby assist;set up  -       Row Name 07/07/24 1538          Transfers    Transfers toilet transfer;bed-chair transfer  -[facility] Name 07/07/24 1538          Bed-Chair Transfer    Bed-Chair Isabela (Transfers) set up;1 person to manage equipment  -     Assistive Device (Bed-Chair Transfers) walker, front-wheeled  -[facility] Name 07/07/24 1538          Toilet Transfer    Type (Toilet Transfer) stand pivot/stand step  -     Isabela Level (Toilet Transfer) 1 person to manage equipment;standby assist  -     Assistive Device (Toilet Transfer) commode, bedside without drop arms;walker, front-wheeled  -       Row Name 07/07/24 1538          Functional Mobility    Functional Mobility- Ind. Level 1 person;standby assist  -     Functional Mobility- Device walker, front-wheeled  -     Functional Mobility-Distance (Feet) 20  -     Functional Mobility- Comment pt worried about how she looked with \"wild hair\" and no dentures so wanted to stay in the room. No " symptoms occurred asociated with her Afib or amio demarco though her HR was quite varaible.  -[facility] Name 07/07/24 1538          Activities of Daily Living    BADL Assessment/Intervention toileting;grooming  -MH       Row Name 07/07/24 1538          Toileting Assessment/Training    [place] Level (Toileting) adjust/manage clothing;perform perineal hygiene;set up  -     Position (Toileting) unsupported standing;unsupported sitting  -MH       Row Name 07/07/24 1538          Grooming Assessment/Training    [place] Level (Grooming) hair care, combing/brushing;wash face, hands;set up  -     Position (Grooming) unsupported standing  -               User Key  (r) = Recorded By, (t) = Taken By, (c) = Cosigned By      Initials Name Provider Type     Lorin Morillo, OT Occupational Therapist                   Obj/Interventions       Row Name 07/07/24 1542          Vision Assessment/Intervention    Visual Impairment/Limitations WFL  -MH       Row Name 07/07/24 1542          Range of Motion Comprehensive    General Range of Motion no range of motion deficits identified  -MH       Row Name 07/07/24 1542          Strength Comprehensive (MMT)    Comment, General Manual Muscle Testing (MMT) Assessment mild global weakness, improving  -               User Key  (r) = Recorded By, (t) = Taken By, (c) = Cosigned By      Initials Name Provider Type     Lorin Morillo, OT Occupational Therapist                   Goals/Plan       Row Name 07/07/24 1540          Bathing Goal 1 (OT)    Activity/Device (Bathing Goal 1, OT) bathing skills, all  -     [place] Level/Cues Needed (Bathing Goal 1, OT) modified independence  -     Time Frame (Bathing Goal 1, OT) 2 weeks  -MH       Row Name 07/07/24 1549          Dressing Goal 1 (OT)    Activity/Device (Dressing Goal 1, OT) dressing skills, all  -     [place]/Cues Needed (Dressing Goal 1, OT) independent  -     Time Frame (Dressing Goal 1, OT) 5 days  -        Row Name 07/07/24 1549          Therapy Assessment/Plan (OT)    Planned Therapy Interventions (OT) activity tolerance training;BADL retraining;transfer/mobility retraining;functional balance retraining;occupation/activity based interventions;ROM/therapeutic exercise;patient/caregiver education/training  -               User Key  (r) = Recorded By, (t) = Taken By, (c) = Cosigned By      Initials Name Provider Type     Lorin Morillo, MOHAN Occupational Therapist                   Clinical Impression       Row Name 07/07/24 1542          Pain Assessment    Pretreatment Pain Rating 0/10 - no pain  -     Posttreatment Pain Rating 0/10 - no pain  -       Row Name 07/07/24 1542          Plan of Care Review    Plan of Care Reviewed With patient  -     Progress improving  -     Outcome Evaluation Pt is an 83 y/o F admitted to [facility] with uncontrolled Afib and fall. Hospital dx A.fib with RVR. PMHx significant for CKDIII, HTN, pacemaker in place, depressive disorder, fibromyalgia, CHF, hx falls, DDD, and polyneuropathy. At baseline pt resides alone in [facility] with 0 VLADIMIR. Pt typically (I) with I/ADLs and uses quad cane or RW for mobility. Pt is feeling better this date and although she has to be on Amio drip to control her HR she is eager to get up and able to do so with setup and mamg of IV pole, line and tele line. Pt was asymptmoatic of subjective symptoms though her HR varied throughout with maxiums occassionally in the 14's briefly. Pt walked in the room and used [facility] RW and setup. Pt has no steps at hoe and is eager to get home to her dog, a arlenetz. A neighbor is caring for the dog but cannot continue to do so for long Recommend if pt decides she needs rehab, which she adamantly reports she does not, that CM or SW consult about help with her dog.  -       Row Name 07/07/24 1543          Therapy Assessment/Plan (OT)    Rehab Potential (OT) good, to achieve stated therapy goals  -     Criteria for Skilled  Therapeutic Interventions Met (OT) skilled treatment is necessary  -     Therapy Frequency (OT) 5 times/wk  -     Predicted Duration of Therapy Intervention (OT) until d/c  -       Row Name 07/07/24 1542          Therapy Plan Review/Discharge Plan (OT)    Anticipated Discharge Disposition (OT) home with home health  -       Row Name 07/07/24 1542          Vital Signs    Pretreatment Heart Rate (beats/min) 114  -MH     Intratreatment Heart Rate (beats/min) 145  -MH     Posttreatment Heart Rate (beats/min) 121  -MH     O2 Delivery Pre Treatment room air  -     O2 Delivery Intra Treatment room air  -     O2 Delivery Post Treatment room air  -     Pre Patient Position Side Lying  -     Intra Patient Position Standing  -     Post Patient Position Sitting  -       Row Name 07/07/24 1542          Positioning and Restraints    Pre-Treatment Position in bed  -     Post Treatment Position chair  -     In Chair notified nsg;sitting;call light within reach;encouraged to call for assist;exit alarm on;legs elevated  -               User Key  (r) = Recorded By, (t) = Taken By, (c) = Cosigned By      Initials Name Provider Type     Lorin Morillo, OT Occupational Therapist                   Outcome Measures       Row Name 07/07/24 0830 07/07/24 0415       How much help from another person do you currently need...    Turning from your back to your side while in flat bed without using bedrails? 4  -TS 4  -DD    Moving from lying on back to sitting on the side of a flat bed without bedrails? 3  -TS 3  -DD    Moving to and from a bed to a chair (including a wheelchair)? 3  -TS 3  -DD    Standing up from a chair using your arms (e.g., wheelchair, bedside chair)? 3  -TS 3  -DD    Climbing 3-5 steps with a railing? 2  -TS 2  -DD    To walk in hospital room? 3  -TS 3  -DD    AM-PAC 6 Clicks Score (PT) 18  -TS 18  -DD    Highest Level of Mobility Goal 6 --> Walk 10 steps or more  -TS 6 --> Walk 10 steps or more   -DD              User Key  (r) = Recorded By, (t) = Taken By, (c) = Cosigned By      Initials Name Provider Type    DD Aye Sullivan LPN Licensed Nurse    TS Dorothy Claudio LPN Licensed Nurse                    Occupational Therapy Education       Title: PT OT SLP Therapies (In Progress)       Topic: Occupational Therapy (In Progress)       Point: ADL training (Done)       Description:   Instruct learner(s) on proper safety adaptation and remediation techniques during self care or transfers.   Instruct in proper use of assistive devices.                  Learning Progress Summary             Patient Acceptance, E,TB, VU by  at 7/7/2024 1550                         Point: Home exercise program (Not Started)       Description:   Instruct learner(s) on appropriate technique for monitoring, assisting and/or progressing therapeutic exercises/activities.                  Learner Progress:  Not documented in this visit.              Point: Precautions (Not Started)       Description:   Instruct learner(s) on prescribed precautions during self-care and functional transfers.                  Learner Progress:  Not documented in this visit.              Point: Body mechanics (Not Started)       Description:   Instruct learner(s) on proper positioning and spine alignment during self-care, functional mobility activities and/or exercises.                  Learner Progress:  Not documented in this visit.                              User Key       Initials Effective Dates Name Provider Type Discipline     06/16/21 -  Lorin Morillo OT Occupational Therapist OT                  OT Recommendation and Plan  Planned Therapy Interventions (OT): activity tolerance training, BADL retraining, transfer/mobility retraining, functional balance retraining, occupation/activity based interventions, ROM/therapeutic exercise, patient/caregiver education/training  Therapy Frequency (OT): 5 times/wk  Plan of Care Review  Plan of Care  Reviewed With: patient  Progress: improving  Outcome Evaluation: Pt is an 85 y/o F admitted to [facility] with uncontrolled Afib and fall. Hospital dx A.fib with RVR. PMHx significant for CKDIII, HTN, pacemaker in place, depressive disorder, fibromyalgia, CHF, hx falls, DDD, and polyneuropathy. At baseline pt resides alone in [facility] with 0 VLADIMIR. Pt typically (I) with I/ADLs and uses quad cane or RW for mobility. Pt is feeling better this date and although she has to be on Amio drip to control her HR she is eager to get up and able to do so with setup and mamg of IV pole, line and tele line. Pt was asymptmoatic of subjective symptoms though her HR varied throughout with maxiums occassionally in the 14's briefly. Pt walked in the room and used [facility] RW and setup. Pt has no steps at hoe and is eager to get home to her dog, a arlenetz. A neighbor is caring for the dog but cannot continue to do so for long Recommend if pt decides she needs rehab, which she adamantly reports she does not, that CM or SW consult about help with her dog.     Time Calculation:         Time Calculation- OT       Row Name 07/07/24 1550             Time Calculation-     OT Start Time 1452  -      OT Stop Time 1520  -      OT Time Calculation (min) 28 min  -      Total Timed Code Minutes- OT 15 minute(s)  -      OT Received On 07/07/24  -      OT - Next Appointment 07/08/24  -      OT Goal Re-Cert Due Date 07/21/24  -                User Key  (r) = Recorded By, (t) = Taken By, (c) = Cosigned By      Initials Name Provider Type     Lorin Morillo OT Occupational Therapist                  Therapy Charges for Today       Code Description Service Date Service Provider Modifiers Qty    [id number]  OT SELF CARE/MGMT/TRAIN EA 15 MIN 7/7/2024 Lorin Morillo OT GO 1    [id number] HC OT EVAL MOD COMPLEXITY 3 7/7/2024 Lorin Morillo OT GO 1                 Lorin Morillo OT  7/7/2024

## 2024-07-07 NOTE — PROGRESS NOTES
[facility] MEDICINE SERVICE  DAILY PROGRESS NOTE    NAME: Catalina Moreno  : 1940  MRN: [id number]      LOS: 2 days     PROVIDER OF SERVICE: Zuleima Canales MD    Chief Complaint: Atrial fibrillation with RVR    Subjective:     Interval History:  History taken from: patient  Patient Complaints: Patient seen and examined at bedside this morning.  States that when she is resting she feels fine however when she starts moving or walking she starts having palpitations.  Patient Denies:      Review of Systems: Negative except as described above  Review of Systems    Objective:     Vital Signs  Temp:  [97.3 °F (36.3 °C)-98.3 °F (36.8 °C)] 98.3 °F (36.8 °C)  Heart Rate:  [] 131  Resp:  [12-17] 16  BP: ()/(41-85) 115/75  Flow (L/min):  [1.5-3] 3   Body mass index is 29.23 kg/m².    Physical ExamEyes:      Pupils: Pupils are equal, round, and reactive to light.   Cardiovascular:      Rate and Rhythm: Normal rate. Rhythm irregular.   Pulmonary:      Breath sounds: Normal breath sounds.   Abdominal:      General: Bowel sounds are normal.   Musculoskeletal:         General: Normal range of motion.   Neurological:      General: No focal deficit present.      Mental Status: She is alert and oriented to person, place, and time.   Physical Exam    Scheduled Meds   [START ON 2024] amiodarone, 200 mg, Oral, Once   Followed by  [START ON 2024] amiodarone, 200 mg, Oral, Q12H   Followed by  [START ON 2024] amiodarone, 200 mg, Oral, Q12H   Followed by  [START ON 2024] amiodarone, 200 mg, Oral, Daily  apixaban, 5 mg, Oral, Q12H  furosemide, 40 mg, Intravenous, BID  rOPINIRole, 0.25 mg, Oral, Nightly       PRN Meds     acetaminophen **OR** acetaminophen **OR** acetaminophen    senna-docusate sodium **AND** polyethylene glycol **AND** bisacodyl **AND** bisacodyl    Calcium Replacement - Follow Nurse / BPA Driven Protocol    HYDROcodone-acetaminophen    Magnesium Standard Dose  Replacement - Follow Nurse / BPA Driven Protocol    melatonin    nitroglycerin    ondansetron ODT **OR** ondansetron    Phosphorus Replacement - Follow Nurse / BPA Driven Protocol    Potassium Replacement - Follow Nurse / BPA Driven Protocol    sodium chloride    [COMPLETED] Insert Peripheral IV **AND** sodium chloride   Infusions  amiodarone, 1 mg/min, Last Rate: 1 mg/min (07/07/24 1400)   Followed by  amiodarone, 0.5 mg/min          Diagnostic Data    Results from last 7 days   Lab Units 07/07/24  0457 07/06/24  0358 07/05/24  0953   WBC 10*3/mm3 7.79   < > 6.89   HEMOGLOBIN g/dL 9.6*   < > 9.1*   HEMATOCRIT % 31.8*   < > 29.8*   PLATELETS 10*3/mm3 251   < > 254   GLUCOSE mg/dL 125*   < > 106*   CREATININE mg/dL 1.09*   < > 1.15*   BUN mg/dL 20   < > 26*   SODIUM mmol/L 141   < > 137   POTASSIUM mmol/L 3.8   < > 4.4   AST (SGOT) U/L  --   --  24   ALT (SGPT) U/L  --   --  9   ALK PHOS U/L  --   --  90   BILIRUBIN mg/dL  --   --  1.2   ANION GAP mmol/L 9.8   < > 14.5    < > = values in this interval not displayed.       No radiology results for the last day      I reviewed the patient's new clinical results.    Assessment/Plan:     Active and Resolved Problems  Active Hospital Problems    Diagnosis  POA    **Atrial fibrillation with RVR [I48.91]  Yes      Resolved Hospital Problems   No resolved problems to display.       Atrial fibrillation with RVR  -Was initially on Cardizem, discontinued and started on IV amiodarone as per cardiology  - TTE on 7/6 at 24 showed LVEF 46 to 50% with severe dilation of right and left atrial cavity  -BP stable   -Chadsvasc 3+ Eliquis continued  -Cardiology consulted     H/O HFrEF  - BNP 10,939  - last echo 9/7/23 with EF 51-55%  - repeat echo pending  - CXR shows cardiomegaly with pulmonary vascular congestion, will administer a few doses of Lasix however will need to be cautious of blood pressure   -monitor I's and O's        Frequent Falls  - lives alone, no family for support  -  frequent falls and admissions  - patient open to discussing assisted living facility   -PT/OT/CM consulted     Neuropathic pain  Lyrica ordered     Iron deficiency anemia  - Iron panel reviewed, will start patient on IV iron supplementation and occult blood ordered  - Transfuse if <7      H/O Hypothyroidism   - TSH 5.75, free T4 ordered  - may need endocrinology consult  - patient denies taking synthroid    VTE Prophylaxis:  Pharmacologic VTE prophylaxis orders are present.         Code status is   Code Status and Medical Interventions:   Ordered at: 07/05/24 1342     Level Of Support Discussed With:    Patient     Code Status (Patient has no pulse and is not breathing):    CPR (Attempt to Resuscitate)     Medical Interventions (Patient has pulse or is breathing):    Full Support           Time: 30 minutes    Signature: Electronically signed by Zuleima Canales MD, 07/07/24, 14:13 EDT.  [facility] Hospitalist Team

## 2024-07-07 NOTE — PLAN OF CARE
Goal Outcome Evaluation:  Plan of Care Reviewed With: patient        Progress: improving  Outcome Evaluation: Pt is an 83 y/o F admitted to [facility] with uncontrolled Afib and fall. Hospital dx A.fib with RVR. PMHx significant for CKDIII, HTN, pacemaker in place, depressive disorder, fibromyalgia, CHF, hx falls, DDD, and polyneuropathy. At baseline pt resides alone in [facility] with 0 VLADIMIR. Pt typically (I) with I/ADLs and uses quad cane or RW for mobility. Pt is feeling better this date and although she has to be on Amio drip to control her HR she is eager to get up and able to do so with setup and mamg of IV pole, line and tele line. Pt was asymptmoatic of subjective symptoms though her HR varied throughout with maxiums occassionally in the 14's briefly. Pt walked in the room and used [facility] RW and setup. Pt has no steps at hoe and is eager to get home to her dog, a alex. A neighbor is caring for the dog but cannot continue to do so for long Recommend if pt decides she needs rehab, which she adamantly reports she does not, that CM or SW consult about help with her dog.      Anticipated Discharge Disposition (OT): home with home health

## 2024-07-07 NOTE — PLAN OF CARE
Goal Outcome Evaluation:              Outcome Evaluation: Patient Cardizem was stopped at 2144 due to blood pressure. Cardio did not see patient on Saturday.  Called office and had the on call provider consulted.  Attempted to call due to cardizem being stopped with no return call. Patient BP did improve.  HR was watched during the night.  Complaints of pain see MAR.  Pt resting at this time.

## 2024-07-07 NOTE — CONSULTS
CC--- recurrent symptomatic atrial fibrillation    Sub  84-year-old pleasant patient presents with recurrent symptomatic atrial fibrillation.  Patient is well-known to me and was given a trial of Tikosyn last year and had a sick sinus picture with prolonged QT and a dual-chamber pacemaker was implanted.  She has recurrent AF recently and was given amiodarone to sinus rhythm and presented again with atrial fibrillation.  Patient currently on anticoagulation beta-blockers.  Feels poorly with ongoing atrial fibrillation with shortness of breath and palpitations and denies any chest pain or syncope.  Patient had cardiac catheterization  without significant coronary artery stenosis  Echocardiography in the past showed LV dysfunction with ongoing atrial fibrillation  Repeat echocardiogram done in sinus rhythm in 2020 revealed  Left ventricular ejection fraction appears to be 51 - 55%.    The right ventricular cavity is moderately dilated.    The left atrial cavity is moderately dilated.    The right atrial cavity is mildly  dilated.    There is calcification of the aortic valve.    Moderate aortic valve regurgitation is present.    Estimated right ventricular systolic pressure from tricuspid regurgitation is mildly elevated (35-45 mmHg).      HP      Name: Catalina Moreno ADMIT: 2024   : 1940  PCP: Flash Gonzalez MD    MRN: [id number] LOS: 2 days   AGE/SEX: 84 y.o. female  ROOM: 267/1     Chief Complaint   Patient presents with    Fall       Past Medical History:   Diagnosis Date    Acquired spondylolisthesis of lumbosacral region     Acute kidney injury 2022    Anxiety     Arthritis     Atrial fibrillation     paroxysmal    Broken shoulder     left-- due to fall 20 was at [facility]    Chronic pain disorder     Chronic systolic CHF (congestive heart failure) 2023    EF 36-40%    Coronary artery disease involving native coronary artery of native heart without angina pectoris  04/02/2021    nonobstructive    DDD (degenerative disc disease), cervical     DDD (degenerative disc disease), lumbar     Depression     Edema     9/2020 foot    GERD (gastroesophageal reflux disease)     H/O fall     Heart failure with mid-range ejection fraction 03/05/2022    EF 45% per 2D echo    Hypertension     Hypothyroidism     Insomnia     Low back pain     Moderate mitral regurgitation 01/05/2023    Neuropathy     Nonrheumatic tricuspid valve regurgitation     Pain in both feet     Polypharmacy     PONV (postoperative nausea and vomiting)     Pulmonary hypertension     Radiculopathy     Restless legs     Sacroiliitis     Sick sinus syndrome     Added automatically from request for surgery [id number]    Spondylolisthesis     Stage 3a chronic kidney disease     Urinary incontinence     Vitamin D deficiency      Past Surgical History:   Procedure Laterality Date    BACK SURGERY  07/19/2018    PDC &  PSF L3-L5 insitu    CARDIAC CATHETERIZATION N/A 4/2/2021    Procedure: Cardiac Catheterization/Vascular Study;  Surgeon: Barrett Evans MD;  Location: [facility] CATH INVASIVE LOCATION;  Service: Cardiovascular;  Laterality: N/A;    CARDIAC ELECTROPHYSIOLOGY PROCEDURE N/A 1/17/2023    Procedure: Pacemaker DC new;  Surgeon: Baljeet Valero MD;  Location: [facility] CATH INVASIVE LOCATION;  Service: Cardiovascular;  Laterality: N/A;    CHOLECYSTECTOMY      COLONOSCOPY      ENDOSCOPY N/A 9/14/2023    Procedure: ESOPHAGOGASTRODUODENOSCOPY;  Surgeon: Ulysses Lamas MD;  Location: [facility] ENDOSCOPY;  Service: Gastroenterology;  Laterality: N/A;  gastritis, inflammatory gastric polyp    HYSTERECTOMY      ROTATOR CUFF REPAIR Left      Family History   Problem Relation Age of Onset    Cancer Father     Diabetes Son     Cancer Paternal Aunt     Heart disease Paternal Aunt     Cancer Paternal Uncle      Social History     Tobacco Use    Smoking status: Never     Passive exposure: Never    Smokeless tobacco:  Never   Vaping Use    Vaping status: Never Used   Substance Use Topics    Alcohol use: No    Drug use: Never     Medications Prior to Admission   Medication Sig Dispense Refill Last Dose    apixaban (ELIQUIS) 5 MG tablet tablet Take 1 tablet by mouth Every 12 (Twelve) Hours. Indications: Atrial Fibrillation 60 tablet 2 7/5/2024    gabapentin (NEURONTIN) 600 MG tablet Take 1 tablet by mouth 4 (Four) Times a Day.   Past Week    melatonin 1 MG tablet Take 3 tablets by mouth Every Night.   Past Week    metoprolol succinate XL (TOPROL-XL) 25 MG 24 hr tablet Take 1 tablet by mouth Daily.   7/4/2024    rOPINIRole (REQUIP) 5 MG tablet Take 5 tablets by mouth Every Night.   Past Week    cyclobenzaprine (FLEXERIL) 5 MG tablet Take 1 tablet by mouth 2 (Two) Times a Day As Needed for Muscle Spasms.          Physical Exam  VITALS REVIEWED    General:      well developed, well nourished, in mild distress.    Head:      normocephalic and atraumatic.    Eyes:      PERRL/EOM intact, conjunctivae and sclerae clear without nystagmus.    Neck:      no masses, thyromegaly,  trachea central with normal respiratory effort   Lungs:      clear bilaterally to auscultation.    Heart:       underlying rapid atrial fibrillation with irregularly irregular rhythm and rapid ventricular rate without any murmurs, gallops or rubs  Msk:      no deformity or scoliosis noted of thoracic or lumbar spine.    Pulses:      pulses normal in all 4 extremities.    Extremities:       no cyanosis or clubbing--trace left pedal edema and trace right pedal edema.    Neurologic:      no focal deficits.   alert oriented x3  Skin:      intact without lesions or rashes.    Psych:      alert and cooperative; normal mood and affect; normal attention span and concentration.          CBC    Results from last 7 days   Lab Units 07/07/24  0457 07/06/24  0358 07/05/24  0953   WBC 10*3/mm3 7.79 6.59 6.89   HEMOGLOBIN g/dL 9.6* 8.5* 9.1*   PLATELETS 10*3/mm3 251 231 254     BMP    Results from last 7 days   Lab Units 07/07/24  0457 07/06/24  0358 07/05/24  0953   SODIUM mmol/L 141 138 137   POTASSIUM mmol/L 3.8 4.0 4.4   CHLORIDE mmol/L 105 106 103   CO2 mmol/L 26.2 20.7* 19.5*   BUN mg/dL 20 23 26*   CREATININE mg/dL 1.09* 1.07* 1.15*   GLUCOSE mg/dL 125* 90 106*   MAGNESIUM mg/dL  --   --  2.0         Assessment and plan    Intermittent rapid atrial fibrillation with symptoms  Stop IV Cardizem  Start IV amiodarone  Dual-chamber pacemaker in situ for sick sinus syndrome  Patient has clearly iron deficiency anemia and Hemoccult testing has been ordered by the hospitalist  Continue anticoagulation in the interim  Await pharmacological conversion with amiodarone  TSH minimally elevated which can be followed as an outpatient  Mild to moderate valvular heart disease stable and will review the echo  Minimal CAD on cardiac catheterization in the past    If the patient has recurrent AF despite medical management, consider AF ablation.          Electronically signed by Baljeet Valero MD, 07/07/24, 1:34 PM EDT.

## 2024-07-08 LAB
25(OH)D3 SERPL-MCNC: 21.2 NG/ML (ref 30–100)
ANION GAP SERPL CALCULATED.3IONS-SCNC: 11.1 MMOL/L (ref 5–15)
BASOPHILS # BLD AUTO: 0.05 10*3/MM3 (ref 0–0.2)
BASOPHILS NFR BLD AUTO: 0.7 % (ref 0–1.5)
BUN SERPL-MCNC: 14 MG/DL (ref 8–23)
BUN/CREAT SERPL: 13.3 (ref 7–25)
CALCIUM SPEC-SCNC: 7.9 MG/DL (ref 8.6–10.5)
CHLORIDE SERPL-SCNC: 100 MMOL/L (ref 98–107)
CO2 SERPL-SCNC: 27.9 MMOL/L (ref 22–29)
CREAT SERPL-MCNC: 1.05 MG/DL (ref 0.57–1)
DEPRECATED RDW RBC AUTO: 51 FL (ref 37–54)
EGFRCR SERPLBLD CKD-EPI 2021: 52.8 ML/MIN/1.73
EOSINOPHIL # BLD AUTO: 0.27 10*3/MM3 (ref 0–0.4)
EOSINOPHIL NFR BLD AUTO: 3.9 % (ref 0.3–6.2)
ERYTHROCYTE [DISTWIDTH] IN BLOOD BY AUTOMATED COUNT: 18.3 % (ref 12.3–15.4)
GLUCOSE SERPL-MCNC: 105 MG/DL (ref 65–99)
HBA1C MFR BLD: 6.27 % (ref 4.8–5.6)
HCT VFR BLD AUTO: 31 % (ref 34–46.6)
HGB BLD-MCNC: 9.4 G/DL (ref 12–15.9)
IMM GRANULOCYTES # BLD AUTO: 0.03 10*3/MM3 (ref 0–0.05)
IMM GRANULOCYTES NFR BLD AUTO: 0.4 % (ref 0–0.5)
LYMPHOCYTES # BLD AUTO: 1.7 10*3/MM3 (ref 0.7–3.1)
LYMPHOCYTES NFR BLD AUTO: 24.8 % (ref 19.6–45.3)
MAGNESIUM SERPL-MCNC: 1.8 MG/DL (ref 1.6–2.4)
MCH RBC QN AUTO: 24.2 PG (ref 26.6–33)
MCHC RBC AUTO-ENTMCNC: 30.3 G/DL (ref 31.5–35.7)
MCV RBC AUTO: 79.7 FL (ref 79–97)
MONOCYTES # BLD AUTO: 0.49 10*3/MM3 (ref 0.1–0.9)
MONOCYTES NFR BLD AUTO: 7.1 % (ref 5–12)
NEUTROPHILS NFR BLD AUTO: 4.32 10*3/MM3 (ref 1.7–7)
NEUTROPHILS NFR BLD AUTO: 63.1 % (ref 42.7–76)
NRBC BLD AUTO-RTO: 0 /100 WBC (ref 0–0.2)
PHOSPHATE SERPL-MCNC: 3.6 MG/DL (ref 2.5–4.5)
PLATELET # BLD AUTO: 260 10*3/MM3 (ref 140–450)
PMV BLD AUTO: 10.6 FL (ref 6–12)
POTASSIUM SERPL-SCNC: 3.1 MMOL/L (ref 3.5–5.2)
POTASSIUM SERPL-SCNC: 3.8 MMOL/L (ref 3.5–5.2)
RBC # BLD AUTO: 3.89 10*6/MM3 (ref 3.77–5.28)
SODIUM SERPL-SCNC: 139 MMOL/L (ref 136–145)
WBC NRBC COR # BLD AUTO: 6.86 10*3/MM3 (ref 3.4–10.8)

## 2024-07-08 PROCEDURE — 83735 ASSAY OF MAGNESIUM: CPT

## 2024-07-08 PROCEDURE — 85025 COMPLETE CBC W/AUTO DIFF WBC: CPT

## 2024-07-08 PROCEDURE — 97535 SELF CARE MNGMENT TRAINING: CPT | Performed by: OCCUPATIONAL THERAPIST

## 2024-07-08 PROCEDURE — 83036 HEMOGLOBIN GLYCOSYLATED A1C: CPT

## 2024-07-08 PROCEDURE — 25010000002 AMIODARONE IN DEXTROSE 5% 360-4.14 MG/200ML-% SOLUTION

## 2024-07-08 PROCEDURE — 25010000002 ONDANSETRON PER 1 MG

## 2024-07-08 PROCEDURE — 25010000002 NA FERRIC GLUC CPLX PER 12.5 MG

## 2024-07-08 PROCEDURE — 97530 THERAPEUTIC ACTIVITIES: CPT | Performed by: OCCUPATIONAL THERAPIST

## 2024-07-08 PROCEDURE — 82306 VITAMIN D 25 HYDROXY: CPT

## 2024-07-08 PROCEDURE — 84100 ASSAY OF PHOSPHORUS: CPT

## 2024-07-08 PROCEDURE — 25810000003 SODIUM CHLORIDE 0.9 % SOLUTION

## 2024-07-08 PROCEDURE — 84132 ASSAY OF SERUM POTASSIUM: CPT | Performed by: INTERNAL MEDICINE

## 2024-07-08 PROCEDURE — 99232 SBSQ HOSP IP/OBS MODERATE 35: CPT

## 2024-07-08 PROCEDURE — 25010000002 AMIODARONE IN DEXTROSE 5% 360-4.14 MG/200ML-% SOLUTION: Performed by: INTERNAL MEDICINE

## 2024-07-08 PROCEDURE — 25010000002 FUROSEMIDE PER 20 MG

## 2024-07-08 PROCEDURE — 80048 BASIC METABOLIC PNL TOTAL CA: CPT

## 2024-07-08 PROCEDURE — 99232 SBSQ HOSP IP/OBS MODERATE 35: CPT | Performed by: INTERNAL MEDICINE

## 2024-07-08 RX ORDER — AMIODARONE HYDROCHLORIDE 200 MG/1
200 TABLET ORAL EVERY 12 HOURS
Status: DISCONTINUED | OUTPATIENT
Start: 2024-07-23 | End: 2024-07-11 | Stop reason: HOSPADM

## 2024-07-08 RX ORDER — POTASSIUM CHLORIDE 20 MEQ/1
40 TABLET, EXTENDED RELEASE ORAL EVERY 4 HOURS
Status: COMPLETED | OUTPATIENT
Start: 2024-07-08 | End: 2024-07-08

## 2024-07-08 RX ORDER — METOPROLOL SUCCINATE 25 MG/1
25 TABLET, EXTENDED RELEASE ORAL
Status: DISCONTINUED | OUTPATIENT
Start: 2024-07-08 | End: 2024-07-11 | Stop reason: HOSPADM

## 2024-07-08 RX ORDER — AMIODARONE HYDROCHLORIDE 200 MG/1
200 TABLET ORAL DAILY
Status: DISCONTINUED | OUTPATIENT
Start: 2024-08-06 | End: 2024-07-11 | Stop reason: HOSPADM

## 2024-07-08 RX ORDER — AMIODARONE HYDROCHLORIDE 200 MG/1
200 TABLET ORAL EVERY 8 HOURS
Status: DISCONTINUED | OUTPATIENT
Start: 2024-07-09 | End: 2024-07-11 | Stop reason: HOSPADM

## 2024-07-08 RX ORDER — AMIODARONE HYDROCHLORIDE 200 MG/1
200 TABLET ORAL ONCE
Status: COMPLETED | OUTPATIENT
Start: 2024-07-08 | End: 2024-07-08

## 2024-07-08 RX ADMIN — FUROSEMIDE 40 MG: 10 INJECTION, SOLUTION INTRAMUSCULAR; INTRAVENOUS at 17:02

## 2024-07-08 RX ADMIN — AMIODARONE HYDROCHLORIDE 0.5 MG/MIN: 1.8 INJECTION, SOLUTION INTRAVENOUS at 14:26

## 2024-07-08 RX ADMIN — POTASSIUM CHLORIDE 40 MEQ: 1500 TABLET, EXTENDED RELEASE ORAL at 05:24

## 2024-07-08 RX ADMIN — POTASSIUM CHLORIDE 40 MEQ: 1500 TABLET, EXTENDED RELEASE ORAL at 07:32

## 2024-07-08 RX ADMIN — SODIUM CHLORIDE 250 MG: 9 INJECTION, SOLUTION INTRAVENOUS at 07:33

## 2024-07-08 RX ADMIN — Medication 10 ML: at 07:32

## 2024-07-08 RX ADMIN — AMIODARONE HYDROCHLORIDE 0.5 MG/MIN: 1.8 INJECTION, SOLUTION INTRAVENOUS at 05:24

## 2024-07-08 RX ADMIN — Medication 5 MG: at 21:17

## 2024-07-08 RX ADMIN — PREGABALIN 50 MG: 25 CAPSULE ORAL at 05:24

## 2024-07-08 RX ADMIN — Medication 10 ML: at 21:18

## 2024-07-08 RX ADMIN — APIXABAN 5 MG: 5 TABLET, FILM COATED ORAL at 21:17

## 2024-07-08 RX ADMIN — FUROSEMIDE 40 MG: 10 INJECTION, SOLUTION INTRAMUSCULAR; INTRAVENOUS at 07:32

## 2024-07-08 RX ADMIN — PREGABALIN 50 MG: 25 CAPSULE ORAL at 21:17

## 2024-07-08 RX ADMIN — APIXABAN 5 MG: 5 TABLET, FILM COATED ORAL at 07:32

## 2024-07-08 RX ADMIN — POTASSIUM CHLORIDE 40 MEQ: 1500 TABLET, EXTENDED RELEASE ORAL at 14:22

## 2024-07-08 RX ADMIN — SENNOSIDES AND DOCUSATE SODIUM 2 TABLET: 50; 8.6 TABLET ORAL at 16:05

## 2024-07-08 RX ADMIN — HYDROCODONE BITARTRATE AND ACETAMINOPHEN 1 TABLET: 5; 325 TABLET ORAL at 17:57

## 2024-07-08 RX ADMIN — PREGABALIN 50 MG: 25 CAPSULE ORAL at 14:22

## 2024-07-08 RX ADMIN — ONDANSETRON 4 MG: 2 INJECTION INTRAMUSCULAR; INTRAVENOUS at 15:08

## 2024-07-08 RX ADMIN — AMIODARONE HYDROCHLORIDE 200 MG: 200 TABLET ORAL at 16:50

## 2024-07-08 RX ADMIN — ACETAMINOPHEN 650 MG: 325 TABLET, FILM COATED ORAL at 22:44

## 2024-07-08 NOTE — CASE MANAGEMENT/SOCIAL WORK
Continued Stay Note   Gabriel     Patient Name: Catalina Moreno  MRN: [id number]  Today's Date: 7/8/2024    Admit Date: 7/5/2024    Plan: Return home with HH (will need order)   Discharge Plan       Row Name 07/08/24 7906       Plan    Plan Return home with HH (will need order)    Patient/Family in Agreement with Plan yes    Plan Comments Barriers to discharge: IV amiodarone, plan change to oral.  IV lasix.             Expected Discharge Date and Time       Expected Discharge Date Expected Discharge Time    Jul 9, 2024           Yanique Seo RN     Office Phone [phone]  Office Cell [phone]

## 2024-07-08 NOTE — PROGRESS NOTES
[facility] MEDICINE SERVICE  DAILY PROGRESS NOTE    NAME: Catalina Moreno  : 1940  MRN: [id number]      LOS: 3 days     PROVIDER OF SERVICE: Zuleima Canales MD    Chief Complaint: Atrial fibrillation with RVR    Subjective:     Interval History:  History taken from: patient  Patient Complaints: Patient seen and examined at bedside this morning.  States that she has not had any palpitations and doing fine however reviewed cardiology note and states patient feeling worse?  Patient Denies:      Review of Systems: Negative except as described above  Review of Systems    Objective:     Vital Signs  Temp:  [97.3 °F (36.3 °C)-98.3 °F (36.8 °C)] 97.8 °F (36.6 °C)  Heart Rate:  [] 96  Resp:  [11-16] 16  BP: ()/() 109/56  Flow (L/min):  [1-3] 2   Body mass index is 29.23 kg/m².    Physical Exam  Physical Exam Eyes:      Pupils: Pupils are equal, round, and reactive to light.   Cardiovascular:      Rate and Rhythm: Normal rate. Rhythm irregular.   Pulmonary:      Breath sounds: Normal breath sounds.   Abdominal:      General: Bowel sounds are normal.   Musculoskeletal:         General: Normal range of motion.   Neurological:      General: No focal deficit present.      Mental Status: She is alert and oriented to person, place, and time.     Scheduled Meds   amiodarone, 200 mg, Oral, Once   Followed by  amiodarone, 200 mg, Oral, Q12H   Followed by  [START ON 2024] amiodarone, 200 mg, Oral, Q12H   Followed by  [START ON 2024] amiodarone, 200 mg, Oral, Daily  apixaban, 5 mg, Oral, Q12H  ferric gluconate, 250 mg, Intravenous, Daily  furosemide, 40 mg, Intravenous, BID  metoprolol succinate XL, 25 mg, Oral, Q24H  potassium chloride ER, 40 mEq, Oral, Q4H  pregabalin, 50 mg, Oral, Q8H  rOPINIRole, 0.25 mg, Oral, Nightly       PRN Meds     acetaminophen **OR** acetaminophen **OR** acetaminophen    senna-docusate sodium **AND** polyethylene glycol **AND** bisacodyl **AND**  bisacodyl    Calcium Replacement - Follow Nurse / BPA Driven Protocol    HYDROcodone-acetaminophen    Magnesium Standard Dose Replacement - Follow Nurse / BPA Driven Protocol    melatonin    nitroglycerin    ondansetron ODT **OR** ondansetron    Phosphorus Replacement - Follow Nurse / BPA Driven Protocol    Potassium Replacement - Follow Nurse / BPA Driven Protocol    sodium chloride    [COMPLETED] Insert Peripheral IV **AND** sodium chloride   Infusions  amiodarone, 0.5 mg/min, Last Rate: 0.5 mg/min (07/08/24 0524)          Diagnostic Data    Results from last 7 days   Lab Units 07/08/24  1026 07/08/24  0309 07/06/24  0358 07/05/24  0953   WBC 10*3/mm3  --  6.86   < > 6.89   HEMOGLOBIN g/dL  --  9.4*   < > 9.1*   HEMATOCRIT %  --  31.0*   < > 29.8*   PLATELETS 10*3/mm3  --  260   < > 254   GLUCOSE mg/dL  --  105*   < > 106*   CREATININE mg/dL  --  1.05*   < > 1.15*   BUN mg/dL  --  14   < > 26*   SODIUM mmol/L  --  139   < > 137   POTASSIUM mmol/L 3.8 3.1*   < > 4.4   AST (SGOT) U/L  --   --   --  24   ALT (SGPT) U/L  --   --   --  9   ALK PHOS U/L  --   --   --  90   BILIRUBIN mg/dL  --   --   --  1.2   ANION GAP mmol/L  --  11.1   < > 14.5    < > = values in this interval not displayed.       No radiology results for the last day      I reviewed the patient's new clinical results.    Assessment/Plan:     Active and Resolved Problems  Active Hospital Problems    Diagnosis  POA    **Atrial fibrillation with RVR [I48.91]  Yes    Atrial flutter with rapid ventricular response [I48.92]  Yes    Chronic HFrEF (heart failure with reduced ejection fraction) [I50.22]  Yes    Presence of cardiac pacemaker [Z95.0]  Yes    Essential (primary) hypertension [I10]  Yes      Resolved Hospital Problems   No resolved problems to display.       Atrial fibrillation with RVR  -Was initially on Cardizem, discontinued and started on IV amiodarone as per cardiology  - TTE on 7/6 at 24 showed LVEF 46 to 50% with severe dilation of right  and left atrial cavity  -BP stable   -Chadsvasc 3+ Eliquis continued  -Cardiology consulted, restarted beta-blocker and switched amnio IV to p.o.  Considering ablation if patient continues to remain symptomatic  - TSH elevated, free T4 normal     H/O HFrEF  - BNP 10,939  - last echo 9/7/23 with EF 51-55%  - repeat echo pending  - CXR shows cardiomegaly with pulmonary vascular congestion, will administer a few doses of Lasix however will need to be cautious of blood pressure   -monitor I's and O's        Frequent Falls  - lives alone, no family for support  - frequent falls and admissions  - patient open to discussing assisted living facility   -PT/OT/CM consulted      Neuropathic pain  Lyrica ordered  A1c 6.27  vitamin D ordered     Iron deficiency anemia  - Iron panel reviewed, will start patient on IV iron supplementation and occult blood ordered  - Transfuse if <7      H/O Hypothyroidism   - TSH 5.75, free T4 ordered  - may need endocrinology consult  - patient denies taking synthroid       VTE Prophylaxis:  Pharmacologic VTE prophylaxis orders are present.         Code status is   Code Status and Medical Interventions:   Ordered at: 07/05/24 1342     Level Of Support Discussed With:    Patient     Code Status (Patient has no pulse and is not breathing):    CPR (Attempt to Resuscitate)     Medical Interventions (Patient has pulse or is breathing):    Full Support       Plan for disposition: DC in 1 to 2 days Home health    Time: 30 minutes    Signature: Electronically signed by Zuleima Canales MD, 07/08/24, 12:52 EDT.  [facility] Hospitalist Team

## 2024-07-08 NOTE — THERAPY TREATMENT NOTE
"Subjective: Pt agreeable to therapeutic plan of care. Pt stating she doesn't feel very well today, but agreed to participate with therapy.   Cognition: oriented to Person, Place, Time, and Situation    Objective:     Bed Mobility: Independent   Functional Transfers: CGA     Balance: sitting EOB SBA; standing = CGA      Toileting: SBA  ADL Position: unsupported sitting  ADL Comments: [facility]    Vitals: Orthostatic. Pt c/o feeling light-headed in standing. Pt's BP in sitting = 127/68. BP dec to 108/63 in standing with pt c/o light headedness. Pt sat back down & Les were elevated. BP inc to 113/72.Her HR inc from 92 to 126 as well.  Pt began to feel better after a few minutes. RN informed of pt's s/s.     Pain: 0 VEducation: Provided education on the importance of mobility in the acute care setting, Verbal/Tactile Cues, ADL training, and Transfer Training      Assessment: Catalina Moreno presents with ADL impairments affecting function including endurance / activity tolerance. Pt symptomatic with light-headness, varying HR & s/s of orthostatic hypotension. Discussed safety awareness related to these s/s. Informed RN of her status. Demonstrated functioning below baseline abilities indicate the need for continued skilled intervention while inpatient. Tolerating session today without incident. Will continue to follow and progress as tolerated.     Plan/Recommendations:   Low Intensity Therapy recommended post-acute care - This is recommended as therapy feels this patient would require 2-3 visits per week. OP or HH would be the best option depending on patient's home bound status. Consider, if the patient has other  \"skilled\" needs such as wounds, IV antibiotics, etc. Combined with \"low intensity\" could also equate to a SNF. If patient is medically complex, consider LTAC.. Pt requires no DME at discharge.     Pt desires Home with Home Health at discharge. Pt cooperative; agreeable to therapeutic recommendations and plan of " care.     [place]: N/A = No pre-op stroke/TIA    Post-Tx Position: Up in Chair, Alarms activated, and Call light and personal items within reach  PPE: gloves

## 2024-07-08 NOTE — SIGNIFICANT NOTE
07/08/24 1517   OTHER   Discipline physical therapist   Rehab Time/Intention   Session Not Performed patient/family declined treatment  (nauseated, just had meds)   Therapy Assessment/Plan (PT)   Criteria for Skilled Interventions Met (PT) yes;meets criteria   Recommendation   PT - Next Appointment 07/09/24

## 2024-07-08 NOTE — PROGRESS NOTES
"            Cardiology Progress Note-EP      Patient Care Team:  Flash Gonzalez MD as PCP - General (Cardiology)  Flash Gonzalez MD as Consulting Physician (Cardiology)    PATIENT IDENTIFICATION  Name: Catalina Moreno  Age: 84 y.o.  Sex: female  :  1940  MRN: [id number]             Length of stay:   LOS: 3 days     REASON FOR FOLLOW-UP  Recurrent symptomatic atrial fibrillation    INTERVAL HISTORY  Patient seen and examined, chart and labs reviewed.  She is lying in bed and appears to not feel well.  She reports that she feels as if she has no energy today, concerned about going home as she lives alone.  Telemetry shows atrial fibrillation 80s-100s.  She remains on IV amiodarone 0.5 mg/min      SUBJECTIVE    Very tired and fatigued, decreased energy  No chest discomfort  No shortness of breath  No palpitations  No GI complaints      REVIEW OF SYSTEMS:  Pertinent items are noted in HPI, all other systems reviewed and negative    OBJECTIVE   Creatinine 1.05  Potassium 3.8 (3.1)    ASSESSMENT  Intermittent rapid atrial fibrillation-symptomatic  Sick sinus syndrome  Dual-chamber permanent pacemaker in situ  Iron deficiency anemia  Valvular heart disease  Coronary artery disease-minimal per heart cath      RECOMMENDATIONS  Transition to oral amiodarone-orders placed per cardiologist  Continue apixaban for stroke prevention from elevated chads vascular score  Continue BB  Continue monitor rhythm closely              CHF Guideline Directed Medical Therapy  Beta Blocker:   ARNI/ACE/ARB:   SGLT 2 inhibitors:   Diuretics:   Aldosterone Antagonist:   Vasodilators & Nitrates:     Vital Signs  Visit Vitals  /55   Pulse 102   Temp 97.8 °F (36.6 °C) (Oral)   Resp 16   Ht 160 cm (63\")   Wt 74.8 kg (165 lb)   SpO2 91%   BMI 29.23 kg/m²     Oxygen Therapy  SpO2: 91 %  Pulse Oximetry Type: Continuous  Device (Oxygen Therapy): nasal cannula  Device (Oxygen Therapy) (Infant): nasal cannula  Flow (L/min): 2  Flowsheet " "Rows      Flowsheet Row First Filed Value   Admission Height 160 cm (63\") Documented at 07/05/2024 0950   Admission Weight 74.8 kg (165 lb) Documented at 07/05/2024 0950          Intake & Output (last 3 days)         07/05 0701  07/06 0700 07/06 0701  07/07 0700 07/07 0701  07/08 0700 07/08 0701  07/09 0700    P.O. 240 720 240 354    Total Intake(mL/kg) 240 (3.2) 720 (9.6) 240 (3.2) 354 (4.7)    Urine (mL/kg/hr)  900 (0.5)      Stool  0      Total Output  900      Net +240 -180 +240 +354            Urine Unmeasured Occurrence 13 x 2 x 1 x 2 x          Lines, Drains & Airways       Active LDAs       Name Placement date Placement time Site Days    Peripheral IV 07/07/24 1718 Anterior;Right Forearm 07/07/24  1718  Forearm  less than 1    Peripheral IV 07/08/24 0810 Anterior;Left;Proximal Forearm 07/08/24  0810  Forearm  less than 1                           /55   Pulse 102   Temp 97.8 °F (36.6 °C) (Oral)   Resp 16   Ht 160 cm (63\")   Wt 74.8 kg (165 lb)   SpO2 91%   BMI 29.23 kg/m²   Intake/Output last 3 shifts:  I/O last 3 completed shifts:  In: 480 [P.O.:480]  Out: 900 [Urine:900]  Intake/Output this shift:  I/O this shift:  In: 354 [P.O.:354]  Out: -     PHYSICAL EXAM:    General: Alert, cooperative, no distress but appears to not feel well, appears stated age  Head:  Normocephalic, atraumatic, mucous membranes moist  Eyes:  Conjunctivae/corneas clear, EOM's intact     Neck:  Supple,  no adenopathy; no JVD or bruit  Lungs:  Clear to auscultation bilaterally, no wheezes, rhonchi or rales are noted  Chest wall: No tenderness  Heart::  Irregularly irregular rate and rhythm, S1 and S2 normal, no murmur, rub or gallop  Abdomen: Soft, nontender, nondistended, bowel sounds active  Extremities: No cyanosis, clubbing, or edema   Pulses: 2+ and symmetric all extremities  Skin:  No rashes or lesions  Neuro/psych: A&O x3. CN II through XII are grossly intact with appropriate affect    Scheduled Meds:  " "    amiodarone, 200 mg, Oral, Once   Followed by  amiodarone, 200 mg, Oral, Q8H   Followed by  [START ON 7/22/2024] amiodarone, 200 mg, Oral, Q12H   Followed by  [START ON 8/6/2024] amiodarone, 200 mg, Oral, Daily  apixaban, 5 mg, Oral, Q12H  ferric gluconate, 250 mg, Intravenous, Daily  furosemide, 40 mg, Intravenous, BID  metoprolol succinate XL, 25 mg, Oral, Q24H  potassium chloride ER, 40 mEq, Oral, Q4H  pregabalin, 50 mg, Oral, Q8H  rOPINIRole, 0.25 mg, Oral, Nightly        Continuous Infusions:    amiodarone, 0.5 mg/min        PRN Meds:      acetaminophen **OR** acetaminophen **OR** acetaminophen    senna-docusate sodium **AND** polyethylene glycol **AND** bisacodyl **AND** bisacodyl    Calcium Replacement - Follow Nurse / BPA Driven Protocol    HYDROcodone-acetaminophen    Magnesium Standard Dose Replacement - Follow Nurse / BPA Driven Protocol    melatonin    nitroglycerin    ondansetron ODT **OR** ondansetron    Phosphorus Replacement - Follow Nurse / BPA Driven Protocol    Potassium Replacement - Follow Nurse / BPA Driven Protocol    sodium chloride    [COMPLETED] Insert Peripheral IV **AND** sodium chloride        Results Review:     I reviewed the patient's new clinical results.    CBC    Results from last 7 days   Lab Units 07/08/24  0309 07/07/24  0457 07/06/24  0358 07/05/24  0953   WBC 10*3/mm3 6.86 7.79 6.59 6.89   HEMOGLOBIN g/dL 9.4* 9.6* 8.5* 9.1*   PLATELETS 10*3/mm3 260 251 231 254     Cr Clearance Estimated Creatinine Clearance: 39.3 mL/min (A) (by C-G formula based on SCr of 1.05 mg/dL (H)).  Coag     HbA1C   Lab Results   Component Value Date    HGBA1C 6.27 (H) 07/08/2024    HGBA1C 5.80 (H) 09/18/2023    HGBA1C 5.9 (H) 01/05/2023     Blood Glucose No results found for: \"POCGLU\"  Infection     CMP   Results from last 7 days   Lab Units 07/08/24  1026 07/08/24  0309 07/07/24  0457 07/06/24  0358 07/05/24  0953   SODIUM mmol/L  --  139 141 138 137   POTASSIUM mmol/L 3.8 3.1* 3.8 4.0 4.4 " "  CHLORIDE mmol/L  --  100 105 106 103   CO2 mmol/L  --  27.9 26.2 20.7* 19.5*   BUN mg/dL  --  14 20 23 26*   CREATININE mg/dL  --  1.05* 1.09* 1.07* 1.15*   GLUCOSE mg/dL  --  105* 125* 90 106*   ALBUMIN g/dL  --   --   --   --  4.5   BILIRUBIN mg/dL  --   --   --   --  1.2   ALK PHOS U/L  --   --   --   --  90   AST (SGOT) U/L  --   --   --   --  24   ALT (SGPT) U/L  --   --   --   --  9     ABG      UA      CRISTY  No results found for: \"POCMETH\", \"POCAMPHET\", \"POCBARBITUR\", \"POCBENZO\", \"POCCOCAINE\", \"POCOPIATES\", \"POCOXYCODO\", \"POCPHENCYC\", \"POCPROPOXY\", \"POCTHC\", \"POCTRICYC\"  Lysis Labs   Results from last 7 days   Lab Units 07/08/24  0309 07/07/24  0457 07/06/24  0358 07/05/24  0953   HEMOGLOBIN g/dL 9.4* 9.6* 8.5* 9.1*   PLATELETS 10*3/mm3 260 251 231 254   CREATININE mg/dL 1.05* 1.09* 1.07* 1.15*     Radiology(recent) No radiology results for the last day      Results from last 7 days   Lab Units 07/05/24  1156   HSTROP T ng/L 17*       X-rays, labs reviewed personally by physician.    ECG/EMG Results (most recent)       Procedure Component Value Units Date/Time    Telemetry Scan [[id number]] Resulted: 07/05/24     Updated: 07/05/24 1744    Telemetry Scan [[id number]] Resulted: 07/05/24     Updated: 07/05/24 2319    ECG 12 Lead Tachycardia [[id number]] Collected: 07/05/24 0959     Updated: 07/06/24 1022     QT Interval 340 ms      QTC Interval 503 ms     Narrative:      HEART EHHL=930  bpm  RR Csdlxzdr=470  ms  GA Interval=  ms  P Horizontal Axis=  deg  P Front Axis=  deg  QRSD Interval=70  ms  QT Xkmwreei=556  ms  ZIqS=048  ms  QRS Axis=46  deg  T Wave Axis=121  deg  - ABNORMAL ECG -  Atrial fibrillation  Anteroseptal  infarct, age indeterminate  Abnormal T, consider ischemia, lateral leads  Prolonged QT interval  Electronically Signed By: Tommy Greco ([facility]) 2024-07-06 10:22:01  Date and Time of Study:2024-07-05 09:59:47    Telemetry Scan [[id number]] Resulted: 07/05/24     Updated: 07/06/24 1114    Telemetry " Scan [[id number]] Resulted: 07/05/24     Updated: 07/06/24 1155    Adult Transthoracic Echo Complete w/ Color, Spectral and Contrast if necessary per protocol [[id number]] Resulted: 07/06/24 1518     Updated: 07/06/24 1526     LV GLOBAL STRAIN  -7.6 %      LVIDd 4.0 cm      LVIDs 3.4 cm      IVSd 1.00 cm      LVPWd 1.20 cm      FS 15.0 %      IVS/LVPW 0.83 cm      ESV(cubed) 39.3 ml      LV Sys Vol (BSA corrected) 18.9 cm2      EDV(cubed) 64.0 ml      LV Barrera Vol (BSA corrected) 31.3 cm2      LV mass(C)d 145.6 grams      LVOT area 2.27 cm2      LVOT diam 1.70 cm      EDV(MOD-sp4) 55.8 ml      ESV(MOD-sp4) 33.6 ml      SV(MOD-sp4) 22.2 ml      SVi(MOD-SP4) 12.5 ml/m2      SVi (LVOT) 19.4 ml/m2      EF(MOD-sp4) 39.8 %      MV E max abdulkadir 104.0 cm/sec      LA ESV Index (BP) 41.2 ml/m2      TR max abdulkadir 307.0 cm/sec      SV(LVOT) 34.5 ml      RV Base 3.8 cm      RV Mid 2.8 cm      TAPSE (>1.6) 1.42 cm      LA dimension (2D)  4.8 cm      LV V1 max 69.2 cm/sec      LV V1 max PG 1.92 mmHg      LV V1 mean PG 1.00 mmHg      LV V1 VTI 15.2 cm      Ao pk abdulkadir 95.4 cm/sec      Ao max PG 3.6 mmHg      Ao mean PG 2.00 mmHg      Ao V2 VTI 15.3 cm      AMANDA(I,D) 2.25 cm2      AI P1/2t 473.9 msec      MR max abdulkadir 458.0 cm/sec      MR max PG 83.9 mmHg      TR max PG 37.7 mmHg      RVSP(TR) 52.7 mmHg      RAP systole 15.0 mmHg      RV V1 max PG 0.80 mmHg      RV V1 max 44.7 cm/sec      RV V1 VTI 9.7 cm      PA V2 max 64.7 cm/sec      PA acc time 0.13 sec      Ao root diam 3.0 cm      ACS 2.00 cm      Sinus 3.0 cm      EF(MOD-bp) 40.0 %     Narrative:        Left ventricular ejection fraction appears to be 46 - 50%.    Left ventricular wall thickness is consistent with moderate concentric   hypertrophy.    The left atrial cavity is severely dilated.    The right atrial cavity is moderate to severely  dilated.    Abnormal mitral valve structure consistent with dilated annulus.    Moderate to severe mitral valve regurgitation is present with a    centrally-directed jet noted.    Moderate to severe tricuspid valve regurgitation is present.    Estimated right ventricular systolic pressure from tricuspid   regurgitation is moderately elevated (45-55 mmHg).    Transthoracic echocardiography reveals moderate LVH with overall EF   between 45 to 50%  Moderate to severe central mitral regurgitation and moderate to severe TR   with moderate pulm hypertension with mild to moderate AI.  Severe left atrial enlargement and moderate right atrial enlargement and   no effusion.    Electronically signed by Baljeet Valero MD, 07/06/24, 3:26 PM EDT.    Telemetry Scan [[id number]] Resulted: 07/05/24     Updated: 07/06/24 1627    Telemetry Scan [[id number]] Resulted: 07/05/24     Updated: 07/06/24 1702    Telemetry Scan [[id number]] Resulted: 07/05/24     Updated: 07/06/24 1727    Telemetry Scan [[id number]] Resulted: 07/05/24     Updated: 07/08/24 0312    Telemetry Scan [[id number]] Resulted: 07/05/24     Updated: 07/08/24 0413    Telemetry Scan [[id number]] Resulted: 07/05/24     Updated: 07/08/24 0522    Telemetry Scan [[id number]] Resulted: 07/05/24     Updated: 07/08/24 0642    Telemetry Scan [[id number]] Resulted: 07/05/24     Updated: 07/08/24 0713    Telemetry Scan [[id number]] Resulted: 07/05/24     Updated: 07/08/24 0726    Telemetry Scan [[id number]] Resulted: 07/05/24     Updated: 07/08/24 0755    Telemetry Scan [[id number]] Resulted: 07/05/24     Updated: 07/08/24 1103              Medication Review:   I have reviewed the patient's current medication list  Scheduled Meds:amiodarone, 200 mg, Oral, Once   Followed by  amiodarone, 200 mg, Oral, Q8H   Followed by  [START ON 7/22/2024] amiodarone, 200 mg, Oral, Q12H   Followed by  [START ON 8/6/2024] amiodarone, 200 mg, Oral, Daily  apixaban, 5 mg, Oral, Q12H  ferric gluconate, 250 mg, Intravenous, Daily  furosemide, 40 mg, Intravenous, BID  metoprolol succinate XL, 25 mg, Oral, Q24H  potassium chloride ER, 40 mEq, Oral,  "Q4H  pregabalin, 50 mg, Oral, Q8H  rOPINIRole, 0.25 mg, Oral, Nightly      Continuous Infusions:amiodarone, 0.5 mg/min      PRN Meds:.  acetaminophen **OR** acetaminophen **OR** acetaminophen    senna-docusate sodium **AND** polyethylene glycol **AND** bisacodyl **AND** bisacodyl    Calcium Replacement - Follow Nurse / BPA Driven Protocol    HYDROcodone-acetaminophen    Magnesium Standard Dose Replacement - Follow Nurse / BPA Driven Protocol    melatonin    nitroglycerin    ondansetron ODT **OR** ondansetron    Phosphorus Replacement - Follow Nurse / BPA Driven Protocol    Potassium Replacement - Follow Nurse / BPA Driven Protocol    sodium chloride    [COMPLETED] Insert Peripheral IV **AND** sodium chloride    Imaging:  Imaging Results (Last 72 Hours)       ** No results found for the last 72 hours. **              KALYANI Tay  07/08/24  13:53 EDT      EMR Dragon/Transcription:   \"Dictated utilizing Dragon dictation\".        Electronically signed by KALYANI Tay, 07/08/24, 1:53 PM EDT.  Copied text in this note has been reviewed by me and is accurate as of 07/08/24.      Patient seen and examined.  Continues to be in atrial fibrillation.  Hemoccult of stool pending      Physical Exam    General:      well developed, well nourished, in no acute distress.    Head:      normocephalic and atraumatic.    Eyes:      PERRL/EOM intact, conjunctivae and sclerae clear without nystagmus.    Neck:      no  thyromegaly, trachea central with normal respiratory effort  Lungs:      clear bilaterally to auscultation.    Heart:       irregular rate and rhythm, S1, S2 without murmurs, rubs, or gallops  Skin:      intact without lesions or rashes.    Psych:      alert and cooperative; normal mood and affect; normal attention span and concentration.        Change IV amiodarone to oral amiodarone  Consider cardioversion for symptomatic relief  Await workup for iron deficiency anemia before definitive treatment " like ablation for atrial fibrillation  Pacemaker in situ  Discussed with Dr. Gonzalez for cardioversion tomorrow      Electronically signed by Baljeet Valero MD, 07/08/24, 2:03 PM EDT.

## 2024-07-08 NOTE — PLAN OF CARE
Goal Outcome Evaluation:           Progress: improving       Pt c/o BLE neuropathy pain relieved with lyrica and prn pain medication. Currently on 3L O2. Amio gtt infusing. Pt remains in afib with rate between . Diuresing with IV Lasix. Will continue to monitor...

## 2024-07-08 NOTE — PROGRESS NOTES
CARDIOLOGY PROGRESS NOTE:    Catalina Moreno  84 y.o.  female  1940  [id number]      Referring Provider: Christal Tubbs APRN    Reason for follow-up: afib with RVR      Patient Care Team:  Flash Gonzalez MD as PCP - General (Cardiology)  Flash Gonzalez MD as Consulting Physician (Cardiology)    Subjective  Patient seen and examined. Labs and chart reviewed. Patient reports feeling worse today continues to experience palpitations, dyspnea     Objective  Patient lying in bed resting on 1 L NC      Review of Systems   Constitutional: Negative for chills and fever.   Cardiovascular:  Positive for palpitations. Negative for chest pain, leg swelling and syncope.   Respiratory:  Positive for shortness of breath. Negative for cough.    Gastrointestinal:  Negative for abdominal pain, nausea and vomiting.   Neurological:  Negative for dizziness, headaches and light-headedness.       Allergies: Baclofen, Codeine, Ibuprofen, Methocarbamol, Naproxen, Tizanidine hcl, and Tolmetin    Scheduled Meds:amiodarone, 200 mg, Oral, Once   Followed by  amiodarone, 200 mg, Oral, Q12H   Followed by  [START ON 7/18/2024] amiodarone, 200 mg, Oral, Q12H   Followed by  [START ON 8/2/2024] amiodarone, 200 mg, Oral, Daily  apixaban, 5 mg, Oral, Q12H  ferric gluconate, 250 mg, Intravenous, Daily  furosemide, 40 mg, Intravenous, BID  metoprolol succinate XL, 25 mg, Oral, Q24H  potassium chloride ER, 40 mEq, Oral, Q4H  pregabalin, 50 mg, Oral, Q8H  rOPINIRole, 0.25 mg, Oral, Nightly      Continuous Infusions:amiodarone, 0.5 mg/min, Last Rate: 0.5 mg/min (07/08/24 0524)      PRN Meds:.  acetaminophen **OR** acetaminophen **OR** acetaminophen    senna-docusate sodium **AND** polyethylene glycol **AND** bisacodyl **AND** bisacodyl    Calcium Replacement - Follow Nurse / BPA Driven Protocol    HYDROcodone-acetaminophen    Magnesium Standard Dose Replacement - Follow Nurse / BPA Driven Protocol    melatonin    nitroglycerin    ondansetron ODT **OR**  "ondansetron    Phosphorus Replacement - Follow Nurse / BPA Driven Protocol    Potassium Replacement - Follow Nurse / BPA Driven Protocol    sodium chloride    [COMPLETED] Insert Peripheral IV **AND** sodium chloride        VITAL SIGNS  Vitals:    07/08/24 0734 07/08/24 0800 07/08/24 0810 07/08/24 0830   BP: 132/86 123/71  128/80   BP Location:  Right arm     Patient Position:  Lying     Pulse: 93 92 108 83   Resp:  15     Temp:  97.9 °F (36.6 °C)     TempSrc:  Oral     SpO2: 90% 92%  90%   Weight:       Height:           Flowsheet Rows      Flowsheet Row First Filed Value   Admission Height 160 cm (63\") Documented at 07/05/2024 0950   Admission Weight 74.8 kg (165 lb) Documented at 07/05/2024 0950             TELEMETRY: atrial fibrillation with RVR     Physical Exam:  Vitals reviewed.   Constitutional:       Appearance: Overweight and not in distress.   Eyes:      Pupils: Pupils are equal, round, and reactive to light.   HENT:    Mouth/Throat:      Pharynx: Oropharynx is clear.   Pulmonary:      Effort: Pulmonary effort is normal.      Breath sounds: Normal breath sounds.   Cardiovascular:      Tachycardia present. Irregularly irregular rhythm.   Pulses:     Intact distal pulses.   Abdominal:      Palpations: Abdomen is soft.   Musculoskeletal: Normal range of motion.      Cervical back: Normal range of motion and neck supple. Skin:     General: Skin is warm and dry.   Neurological:      General: No focal deficit present.      Mental Status: Alert.          Results Review:   I reviewed the patient's new clinical results.  Lab Results (last 24 hours)       Procedure Component Value Units Date/Time    Hemoglobin A1c [[id number]] Collected: 07/08/24 1026    Specimen: Blood from Arm, Left Updated: 07/08/24 1038    Potassium [[id number]] Collected: 07/08/24 1026    Specimen: Blood from Arm, Left Updated: 07/08/24 1038    Vitamin D,25-Hydroxy [[id number]] Collected: 07/08/24 1026    Specimen: Blood from Arm, Left Updated: " 07/08/24 1038    Basic Metabolic Panel [[id number]]  (Abnormal) Collected: 07/08/24 0309    Specimen: Blood Updated: 07/08/24 0405     Glucose 105 mg/dL      BUN 14 mg/dL      Creatinine 1.05 mg/dL      Sodium 139 mmol/L      Potassium 3.1 mmol/L      Chloride 100 mmol/L      CO2 27.9 mmol/L      Calcium 7.9 mg/dL      BUN/Creatinine Ratio 13.3     Anion Gap 11.1 mmol/L      eGFR 52.8 mL/min/1.73     Narrative:      GFR Normal >60  Chronic Kidney Disease <60  Kidney Failure <15    The GFR formula is only valid for adults with stable renal function between ages 18 and 70.    Magnesium [[id number]]  (Normal) Collected: 07/08/24 0309    Specimen: Blood Updated: 07/08/24 0405     Magnesium 1.8 mg/dL     Phosphorus [[id number]]  (Normal) Collected: 07/08/24 0309    Specimen: Blood Updated: 07/08/24 0404     Phosphorus 3.6 mg/dL     CBC & Differential [[id number]]  (Abnormal) Collected: 07/08/24 0309    Specimen: Blood Updated: 07/08/24 0330    Narrative:      The following orders were created for panel order CBC & Differential.  Procedure                               Abnormality         Status                     ---------                               -----------         ------                     CBC Auto Differential[[id number]]        Abnormal            Final result                 Please view results for these tests on the individual orders.    CBC Auto Differential [[id number]]  (Abnormal) Collected: 07/08/24 0309    Specimen: Blood Updated: 07/08/24 0330     WBC 6.86 10*3/mm3      RBC 3.89 10*6/mm3      Hemoglobin 9.4 g/dL      Hematocrit 31.0 %      MCV 79.7 fL      MCH 24.2 pg      MCHC 30.3 g/dL      RDW 18.3 %      RDW-SD 51.0 fl      MPV 10.6 fL      Platelets 260 10*3/mm3      Neutrophil % 63.1 %      Lymphocyte % 24.8 %      Monocyte % 7.1 %      Eosinophil % 3.9 %      Basophil % 0.7 %      Immature Grans % 0.4 %      Neutrophils, Absolute 4.32 10*3/mm3      Lymphocytes, Absolute 1.70 10*3/mm3      Monocytes,  Absolute 0.49 10*3/mm3      Eosinophils, Absolute 0.27 10*3/mm3      Basophils, Absolute 0.05 10*3/mm3      Immature Grans, Absolute 0.03 10*3/mm3      nRBC 0.0 /100 WBC     Magnesium [[id number]]  (Normal) Collected: 07/07/24 1544    Specimen: Blood from Arm, Right Updated: 07/07/24 1654     Magnesium 1.7 mg/dL             Imaging Results (Last 24 Hours)       ** No results found for the last 24 hours. **            EKG        I personally viewed and interpreted the patient's EKG/Telemetry data:    ECHOCARDIOGRAM:  Results for orders placed during the hospital encounter of 07/05/24    Adult Transthoracic Echo Complete w/ Color, Spectral and Contrast if necessary per protocol    Interpretation Summary    Left ventricular ejection fraction appears to be 46 - 50%.    Left ventricular wall thickness is consistent with moderate concentric hypertrophy.    The left atrial cavity is severely dilated.    The right atrial cavity is moderate to severely  dilated.    Abnormal mitral valve structure consistent with dilated annulus.    Moderate to severe mitral valve regurgitation is present with a centrally-directed jet noted.    Moderate to severe tricuspid valve regurgitation is present.    Estimated right ventricular systolic pressure from tricuspid regurgitation is moderately elevated (45-55 mmHg).    Transthoracic echocardiography reveals moderate LVH with overall EF between 45 to 50%  Moderate to severe central mitral regurgitation and moderate to severe TR with moderate pulm hypertension with mild to moderate AI.  Severe left atrial enlargement and moderate right atrial enlargement and no effusion.    Electronically signed by Baljeet Valero MD, 07/06/24, 3:26 PM EDT.       STRESS MYOVIEW:  Results for orders placed during the hospital encounter of 03/31/21    Stress Test With Myocardial Perfusion One Day    Interpretation Summary  · Findings consistent with a normal ECG stress test.  · Left ventricular ejection  fraction is hyperdynamic (Calculated EF > 70%). .  · Impressions are consistent with a study indicating uncertain risk.  · Large apical defect with some reperfusion during the rest images cannot rule out ischemia EF 76% Clinical correlation is recommended.    Electronically signed by KALYANI Tay, 04/01/21, 11:25 AM EDT.       CARDIAC CATHETERIZATION:  Results for orders placed during the hospital encounter of 03/31/21    Cardiac Catheterization/Vascular Study    Narrative  CARDIAC CATHETERIZATION REPORT    DATE OF PROCEDURE: 4/2/2021      INDICATION FOR PROCEDURE: Abnormal stress test    PROCEDURE PERFORMED: Left heart catheterization, coronary angiography,    PROCEDURE COMMENTS:    All the risks and benefits explained with the patient informed consent was obtained from the patient.  Patient was brought to the cardiac catheterization laboratory placed on the table draped and prepped in the usual sterile fashion.  2% lidocaine was used to anesthetize the right groin area.  Using the modified Seldinger technique 5 Congolese sheath was inserted into the right femoral artery.    5 Congolese JL4 catheter was used to perform the selective left coronary angiography    5 Congolese JR4 catheter was used to perform the selective right coronary angiography    Angio-Seal was placed after exchanging five Congolese sheath with six Congolese sheath    Patient tolerated procedure well without any immediate complications      FINDINGS:    1. HEMODYNAMICS:  LV end-diastolic pressure 7 mmHg, /80 mmHg.    2. LEFT VENTRICULOGRAPHY: Not done patient had echocardiogram    3. CORONARY ANGIOGRAPHY:  Dominance right  Left Main: Large-caliber vessel bifurcates into LAD circumflex artery 0% stenosis  LAD: Large-caliber vessel gives rise to couple of medium caliber diagonal arteries multiple septal branches reaches the apex mild luminal irregularities noted less than 10% stenosis  CIRC: Large-caliber vessel gives rise to marginal lateral  branches less than 10% stenosis  RCA: Large-caliber dominant artery gives rise to PDA and PL branches less than 5 to 10% stenosis    SUMMARY: No obstructive coronary artery disease    RECOMMENDATIONS: Evaluate for noncardiac causes of symptoms aggressive cardiac risk factor modification       OTHER:         Assessment & Plan     Principal Problem:    Atrial fibrillation with RVR  Active Problems:    Essential (primary) hypertension    Presence of cardiac pacemaker    Chronic HFrEF (heart failure with reduced ejection fraction)    Atrial flutter with rapid ventricular response       Afib with RVR  Sick sinus syndrome  s/p dual chamber (1/2023)   Recurrent AF   Amiodarone protocol, oral today   Restart beta blocker   Eliquis for anticoagulation   Consider ablation if remains symptomatic   EP following   Monitor and replace electrolytes per protocol  TSH elevated, T4 normal     HFmEF  Valvular disease  Echocardiogram with LVEF 46-50% moderate to severe MR and TR  proBNP >10,000  Beta blocker  IV diuresis     Hypertension   Blood pressure stable   Restart beta blocker    CAD  Cardiac catheterization form 2021 with minimal disease   Denies chest pain   Troponin mildly elevated likely secondary to afib RVR  Lipid panel with total 107, HDL 42, LDL 49     Anemia  Occult stool ordered  H&H 9.4/31  On KALYANI Sutton  07/08/24  10:41 EDT

## 2024-07-08 NOTE — PLAN OF CARE
"Goal Outcome Evaluation:   Assessment: Catalina Moreno presents with ADL impairments affecting function including endurance / activity tolerance. Pt symptomatic with light-headness, varying HR & s/s of orthostatic hypotension. Discussed safety awareness related to these s/s. Informed RN of her status. Demonstrated functioning below baseline abilities indicate the need for continued skilled intervention while inpatient. Tolerating session today without incident. Will continue to follow and progress as tolerated.     Plan/Recommendations:   Low Intensity Therapy recommended post-acute care - This is recommended as therapy feels this patient would require 2-3 visits per week. OP or HH would be the best option depending on patient's home bound status. Consider, if the patient has other  \"skilled\" needs such as wounds, IV antibiotics, etc. Combined with \"low intensity\" could also equate to a SNF. If patient is medically complex, consider LTAC.. Pt requires no DME at discharge.     Pt desires Home with Home Health at discharge. Pt cooperative; agreeable to therapeutic recommendations and plan of care.                                              "

## 2024-07-09 ENCOUNTER — INPATIENT HOSPITAL (AMBULATORY)
Dept: URBAN - METROPOLITAN AREA HOSPITAL 84 | Facility: HOSPITAL | Age: 84
End: 2024-07-09
Payer: COMMERCIAL

## 2024-07-09 DIAGNOSIS — R11.0 NAUSEA: ICD-10-CM

## 2024-07-09 DIAGNOSIS — Z90.49 ACQUIRED ABSENCE OF OTHER SPECIFIED PARTS OF DIGESTIVE TRACT: ICD-10-CM

## 2024-07-09 DIAGNOSIS — D50.9 IRON DEFICIENCY ANEMIA, UNSPECIFIED: ICD-10-CM

## 2024-07-09 LAB
ANION GAP SERPL CALCULATED.3IONS-SCNC: 12.2 MMOL/L (ref 5–15)
BUN SERPL-MCNC: 16 MG/DL (ref 8–23)
BUN/CREAT SERPL: 12.8 (ref 7–25)
CALCIUM SPEC-SCNC: 8.1 MG/DL (ref 8.6–10.5)
CHLORIDE SERPL-SCNC: 97 MMOL/L (ref 98–107)
CO2 SERPL-SCNC: 25.8 MMOL/L (ref 22–29)
CREAT SERPL-MCNC: 1.25 MG/DL (ref 0.57–1)
DEPRECATED RDW RBC AUTO: 51.6 FL (ref 37–54)
EGFRCR SERPLBLD CKD-EPI 2021: 42.9 ML/MIN/1.73
EOSINOPHIL # BLD MANUAL: 0.07 10*3/MM3 (ref 0–0.4)
EOSINOPHIL NFR BLD MANUAL: 1 % (ref 0.3–6.2)
ERYTHROCYTE [DISTWIDTH] IN BLOOD BY AUTOMATED COUNT: 18.6 % (ref 12.3–15.4)
GLUCOSE SERPL-MCNC: 109 MG/DL (ref 65–99)
HCT VFR BLD AUTO: 29.1 % (ref 34–46.6)
HEMOCCULT STL QL IA: POSITIVE
HGB BLD-MCNC: 8.8 G/DL (ref 12–15.9)
LYMPHOCYTES # BLD MANUAL: 2.57 10*3/MM3 (ref 0.7–3.1)
LYMPHOCYTES NFR BLD MANUAL: 6 % (ref 5–12)
MAGNESIUM SERPL-MCNC: 1.9 MG/DL (ref 1.6–2.4)
MCH RBC QN AUTO: 24.4 PG (ref 26.6–33)
MCHC RBC AUTO-ENTMCNC: 30.2 G/DL (ref 31.5–35.7)
MCV RBC AUTO: 80.6 FL (ref 79–97)
MONOCYTES # BLD: 0.4 10*3/MM3 (ref 0.1–0.9)
NEUTROPHILS # BLD AUTO: 3.56 10*3/MM3 (ref 1.7–7)
NEUTROPHILS NFR BLD MANUAL: 53 % (ref 42.7–76)
NEUTS BAND NFR BLD MANUAL: 1 % (ref 0–5)
PHOSPHATE SERPL-MCNC: 2.9 MG/DL (ref 2.5–4.5)
PLAT MORPH BLD: NORMAL
PLATELET # BLD AUTO: 248 10*3/MM3 (ref 140–450)
PMV BLD AUTO: 10.4 FL (ref 6–12)
POTASSIUM SERPL-SCNC: 4.1 MMOL/L (ref 3.5–5.2)
RBC # BLD AUTO: 3.61 10*6/MM3 (ref 3.77–5.28)
RBC MORPH BLD: NORMAL
SCAN SLIDE: NORMAL
SODIUM SERPL-SCNC: 135 MMOL/L (ref 136–145)
VARIANT LYMPHS NFR BLD MANUAL: 39 % (ref 19.6–45.3)
WBC MORPH BLD: NORMAL
WBC NRBC COR # BLD AUTO: 6.59 10*3/MM3 (ref 3.4–10.8)

## 2024-07-09 PROCEDURE — 97530 THERAPEUTIC ACTIVITIES: CPT

## 2024-07-09 PROCEDURE — 97535 SELF CARE MNGMENT TRAINING: CPT

## 2024-07-09 PROCEDURE — 84100 ASSAY OF PHOSPHORUS: CPT

## 2024-07-09 PROCEDURE — 83735 ASSAY OF MAGNESIUM: CPT

## 2024-07-09 PROCEDURE — 25010000002 FUROSEMIDE PER 20 MG

## 2024-07-09 PROCEDURE — 99232 SBSQ HOSP IP/OBS MODERATE 35: CPT

## 2024-07-09 PROCEDURE — 85007 BL SMEAR W/DIFF WBC COUNT: CPT

## 2024-07-09 PROCEDURE — 25810000003 LACTATED RINGERS PER 1000 ML

## 2024-07-09 PROCEDURE — 85025 COMPLETE CBC W/AUTO DIFF WBC: CPT

## 2024-07-09 PROCEDURE — 82274 ASSAY TEST FOR BLOOD FECAL: CPT

## 2024-07-09 PROCEDURE — 25010000002 NA FERRIC GLUC CPLX PER 12.5 MG

## 2024-07-09 PROCEDURE — 80048 BASIC METABOLIC PNL TOTAL CA: CPT

## 2024-07-09 PROCEDURE — 99223 1ST HOSP IP/OBS HIGH 75: CPT | Mod: FS | Performed by: NURSE PRACTITIONER

## 2024-07-09 PROCEDURE — 25010000002 ONDANSETRON PER 1 MG

## 2024-07-09 PROCEDURE — 25810000003 SODIUM CHLORIDE 0.9 % SOLUTION

## 2024-07-09 RX ORDER — SODIUM CHLORIDE, SODIUM LACTATE, POTASSIUM CHLORIDE, CALCIUM CHLORIDE 600; 310; 30; 20 MG/100ML; MG/100ML; MG/100ML; MG/100ML
50 INJECTION, SOLUTION INTRAVENOUS CONTINUOUS
Status: DISCONTINUED | OUTPATIENT
Start: 2024-07-09 | End: 2024-07-11 | Stop reason: HOSPADM

## 2024-07-09 RX ORDER — SODIUM, POTASSIUM,MAG SULFATES 17.5-3.13G
1 SOLUTION, RECONSTITUTED, ORAL ORAL EVERY 12 HOURS
Status: COMPLETED | OUTPATIENT
Start: 2024-07-09 | End: 2024-07-10

## 2024-07-09 RX ORDER — PANTOPRAZOLE SODIUM 40 MG/10ML
40 INJECTION, POWDER, LYOPHILIZED, FOR SOLUTION INTRAVENOUS
Status: DISCONTINUED | OUTPATIENT
Start: 2024-07-10 | End: 2024-07-11 | Stop reason: HOSPADM

## 2024-07-09 RX ORDER — BISACODYL 5 MG/1
10 TABLET, DELAYED RELEASE ORAL ONCE
Status: COMPLETED | OUTPATIENT
Start: 2024-07-10 | End: 2024-07-10

## 2024-07-09 RX ADMIN — BISACODYL 5 MG: 5 TABLET, COATED ORAL at 09:30

## 2024-07-09 RX ADMIN — PREGABALIN 50 MG: 25 CAPSULE ORAL at 05:47

## 2024-07-09 RX ADMIN — HYDROCODONE BITARTRATE AND ACETAMINOPHEN 1 TABLET: 5; 325 TABLET ORAL at 02:28

## 2024-07-09 RX ADMIN — AMIODARONE HYDROCHLORIDE 200 MG: 200 TABLET ORAL at 01:33

## 2024-07-09 RX ADMIN — AMIODARONE HYDROCHLORIDE 200 MG: 200 TABLET ORAL at 07:47

## 2024-07-09 RX ADMIN — SODIUM CHLORIDE, POTASSIUM CHLORIDE, SODIUM LACTATE AND CALCIUM CHLORIDE 50 ML/HR: 600; 310; 30; 20 INJECTION, SOLUTION INTRAVENOUS at 14:39

## 2024-07-09 RX ADMIN — APIXABAN 5 MG: 5 TABLET, FILM COATED ORAL at 07:48

## 2024-07-09 RX ADMIN — SENNOSIDES AND DOCUSATE SODIUM 2 TABLET: 50; 8.6 TABLET ORAL at 07:47

## 2024-07-09 RX ADMIN — Medication 10 ML: at 07:48

## 2024-07-09 RX ADMIN — HYDROCODONE BITARTRATE AND ACETAMINOPHEN 1 TABLET: 5; 325 TABLET ORAL at 09:30

## 2024-07-09 RX ADMIN — PREGABALIN 50 MG: 25 CAPSULE ORAL at 22:19

## 2024-07-09 RX ADMIN — FUROSEMIDE 40 MG: 10 INJECTION, SOLUTION INTRAMUSCULAR; INTRAVENOUS at 07:48

## 2024-07-09 RX ADMIN — ONDANSETRON 4 MG: 2 INJECTION INTRAMUSCULAR; INTRAVENOUS at 07:27

## 2024-07-09 RX ADMIN — Medication 5000 UNITS: at 14:40

## 2024-07-09 RX ADMIN — PREGABALIN 50 MG: 25 CAPSULE ORAL at 14:40

## 2024-07-09 RX ADMIN — SODIUM CHLORIDE 250 MG: 9 INJECTION, SOLUTION INTRAVENOUS at 07:48

## 2024-07-09 RX ADMIN — SODIUM SULFATE, POTASSIUM SULFATE, MAGNESIUM SULFATE 1 BOTTLE: 17.5; 3.13; 1.6 SOLUTION, CONCENTRATE ORAL at 14:39

## 2024-07-09 RX ADMIN — HYDROCODONE BITARTRATE AND ACETAMINOPHEN 1 TABLET: 5; 325 TABLET ORAL at 23:33

## 2024-07-09 RX ADMIN — AMIODARONE HYDROCHLORIDE 200 MG: 200 TABLET ORAL at 15:48

## 2024-07-09 RX ADMIN — METOPROLOL SUCCINATE 25 MG: 25 TABLET, EXTENDED RELEASE ORAL at 07:48

## 2024-07-09 RX ADMIN — ACETAMINOPHEN 650 MG: 325 TABLET, FILM COATED ORAL at 05:47

## 2024-07-09 NOTE — PROGRESS NOTES
CARDIOLOGY PROGRESS NOTE:    Catalina Moreno  84 y.o.  female  1940  [id number]      Referring Provider: Christal Tubbs APRN    Reason for follow-up: afib with RVR      Patient Care Team:  Flash Goznalez MD as PCP - General (Cardiology)  Flash Gonzalez MD as Consulting Physician (Cardiology)    Subjective  Patient seen and examined. Labs and chart reviewed. Patient complaining of weakness. Denies chest pain/pressure    Objective  Patient lying in bed resting on 1 L NC      Review of Systems   Constitutional: Negative for chills and fever.   Cardiovascular:  Negative for chest pain, leg swelling and syncope.   Respiratory:  Negative for cough and shortness of breath.    Gastrointestinal:  Negative for abdominal pain, nausea and vomiting.   Neurological:  Positive for weakness. Negative for dizziness, headaches and light-headedness.       Allergies: Baclofen, Codeine, Ibuprofen, Methocarbamol, Naproxen, Tizanidine hcl, and Tolmetin    Scheduled Meds:amiodarone, 200 mg, Oral, Q8H   Followed by  [START ON 7/23/2024] amiodarone, 200 mg, Oral, Q12H   Followed by  [START ON 8/6/2024] amiodarone, 200 mg, Oral, Daily  apixaban, 5 mg, Oral, Q12H  furosemide, 40 mg, Intravenous, BID  metoprolol succinate XL, 25 mg, Oral, Q24H  pregabalin, 50 mg, Oral, Q8H      Continuous Infusions:     PRN Meds:.  acetaminophen **OR** acetaminophen **OR** acetaminophen    senna-docusate sodium **AND** polyethylene glycol **AND** bisacodyl **AND** bisacodyl    Calcium Replacement - Follow Nurse / BPA Driven Protocol    HYDROcodone-acetaminophen    Magnesium Standard Dose Replacement - Follow Nurse / BPA Driven Protocol    melatonin    nitroglycerin    ondansetron ODT **OR** ondansetron    Phosphorus Replacement - Follow Nurse / BPA Driven Protocol    Potassium Replacement - Follow Nurse / BPA Driven Protocol    sodium chloride    [COMPLETED] Insert Peripheral IV **AND** sodium chloride        VITAL SIGNS  Vitals:    07/09/24 0030 07/09/24  "0133 07/09/24 0350 07/09/24 0755   BP: 95/45 116/54 110/75 124/71   BP Location: Right arm  Right arm Right arm   Patient Position: Lying  Lying Lying   Pulse: 82 104 112 105   Resp: 16  16 17   Temp: 98.3 °F (36.8 °C)  98.3 °F (36.8 °C) 97.8 °F (36.6 °C)   TempSrc: Oral  Oral Oral   SpO2: 96% 98% 99% 94%   Weight:       Height:           Flowsheet Rows      Flowsheet Row First Filed Value   Admission Height 160 cm (63\") Documented at 07/05/2024 0950   Admission Weight 74.8 kg (165 lb) Documented at 07/05/2024 0950             TELEMETRY: atrial fibrillation with controlled ventricular response     Physical Exam:  Vitals reviewed.   Constitutional:       Appearance: Overweight and not in distress.   Eyes:      Pupils: Pupils are equal, round, and reactive to light.   HENT:    Mouth/Throat:      Pharynx: Oropharynx is clear.   Pulmonary:      Effort: Pulmonary effort is normal.      Breath sounds: Normal breath sounds.   Cardiovascular:      Normal rate. Irregularly irregular rhythm.   Pulses:     Intact distal pulses.   Abdominal:      Palpations: Abdomen is soft.   Musculoskeletal: Normal range of motion.      Cervical back: Normal range of motion and neck supple. Skin:     General: Skin is warm and dry.   Neurological:      General: No focal deficit present.      Mental Status: Alert.          Results Review:   I reviewed the patient's new clinical results.  Lab Results (last 24 hours)       Procedure Component Value Units Date/Time    CBC & Differential [[id number]]  (Abnormal) Collected: 07/09/24 0204    Specimen: Blood from Arm, Right Updated: 07/09/24 0616    Narrative:      The following orders were created for panel order CBC & Differential.  Procedure                               Abnormality         Status                     ---------                               -----------         ------                     CBC Auto Differential[[id number]]        Abnormal            Final result               Scan " Slide[[id number]]                                       Final result                 Please view results for these tests on the individual orders.    CBC Auto Differential [[id number]]  (Abnormal) Collected: 07/09/24 0204    Specimen: Blood from Arm, Right Updated: 07/09/24 0616     WBC 6.59 10*3/mm3      RBC 3.61 10*6/mm3      Hemoglobin 8.8 g/dL      Hematocrit 29.1 %      MCV 80.6 fL      MCH 24.4 pg      MCHC 30.2 g/dL      RDW 18.6 %      RDW-SD 51.6 fl      MPV 10.4 fL      Platelets 248 10*3/mm3     Narrative:      The previously reported component NRBC is no longer being reported. Previous result was 0.0 /100 WBC (Reference Range: 0.0-0.2 /100 WBC) on 7/9/2024 at 0306 EDT.    Scan Slide [[id number]] Collected: 07/09/24 0204    Specimen: Blood from Arm, Right Updated: 07/09/24 0616     Scan Slide --     Comment: See Manual Differential Results       Manual Differential [[id number]]  (Normal) Collected: 07/09/24 0204    Specimen: Blood from Arm, Right Updated: 07/09/24 0616     Neutrophil % 53.0 %      Lymphocyte % 39.0 %      Monocyte % 6.0 %      Eosinophil % 1.0 %      Bands %  1.0 %      Neutrophils Absolute 3.56 10*3/mm3      Lymphocytes Absolute 2.57 10*3/mm3      Monocytes Absolute 0.40 10*3/mm3      Eosinophils Absolute 0.07 10*3/mm3      RBC Morphology Normal     WBC Morphology Normal     Platelet Morphology Normal    Basic Metabolic Panel [[id number]]  (Abnormal) Collected: 07/09/24 0204    Specimen: Blood from Arm, Right Updated: 07/09/24 0236     Glucose 109 mg/dL      BUN 16 mg/dL      Creatinine 1.25 mg/dL      Sodium 135 mmol/L      Potassium 4.1 mmol/L      Chloride 97 mmol/L      CO2 25.8 mmol/L      Calcium 8.1 mg/dL      BUN/Creatinine Ratio 12.8     Anion Gap 12.2 mmol/L      eGFR 42.9 mL/min/1.73     Narrative:      GFR Normal >60  Chronic Kidney Disease <60  Kidney Failure <15    The GFR formula is only valid for adults with stable renal function between ages 18 and 70.    Magnesium  [[id number]]  (Normal) Collected: 07/09/24 0204    Specimen: Blood from Arm, Right Updated: 07/09/24 0236     Magnesium 1.9 mg/dL     Phosphorus [[id number]]  (Normal) Collected: 07/09/24 0204    Specimen: Blood from Arm, Right Updated: 07/09/24 0236     Phosphorus 2.9 mg/dL     Vitamin D,25-Hydroxy [[id number]]  (Abnormal) Collected: 07/08/24 1026    Specimen: Blood from Arm, Left Updated: 07/08/24 1712     25 Hydroxy, Vitamin D 21.2 ng/ml     Narrative:      Reference Range for Total Vitamin D 25(OH)     Deficiency <20.0 ng/mL   Insufficiency 21-29 ng/mL   Sufficiency  ng/mL  Toxicity >100 ng/ml      Potassium [[id number]]  (Normal) Collected: 07/08/24 1026    Specimen: Blood from Arm, Left Updated: 07/08/24 1121     Potassium 3.8 mmol/L      Comment: Specimen hemolyzed.  Result may be falsely elevated.       Hemoglobin A1c [[id number]]  (Abnormal) Collected: 07/08/24 1026    Specimen: Blood from Arm, Left Updated: 07/08/24 1059     Hemoglobin A1C 6.27 %             Imaging Results (Last 24 Hours)       ** No results found for the last 24 hours. **            EKG        I personally viewed and interpreted the patient's EKG/Telemetry data:    ECHOCARDIOGRAM:  Results for orders placed during the hospital encounter of 07/05/24    Adult Transthoracic Echo Complete w/ Color, Spectral and Contrast if necessary per protocol    Interpretation Summary    Left ventricular ejection fraction appears to be 46 - 50%.    Left ventricular wall thickness is consistent with moderate concentric hypertrophy.    The left atrial cavity is severely dilated.    The right atrial cavity is moderate to severely  dilated.    Abnormal mitral valve structure consistent with dilated annulus.    Moderate to severe mitral valve regurgitation is present with a centrally-directed jet noted.    Moderate to severe tricuspid valve regurgitation is present.    Estimated right ventricular systolic pressure from tricuspid regurgitation is moderately  elevated (45-55 mmHg).    Transthoracic echocardiography reveals moderate LVH with overall EF between 45 to 50%  Moderate to severe central mitral regurgitation and moderate to severe TR with moderate pulm hypertension with mild to moderate AI.  Severe left atrial enlargement and moderate right atrial enlargement and no effusion.    Electronically signed by Baljeet Valero MD, 07/06/24, 3:26 PM EDT.       STRESS MYOVIEW:  Results for orders placed during the hospital encounter of 03/31/21    Stress Test With Myocardial Perfusion One Day    Interpretation Summary  · Findings consistent with a normal ECG stress test.  · Left ventricular ejection fraction is hyperdynamic (Calculated EF > 70%). .  · Impressions are consistent with a study indicating uncertain risk.  · Large apical defect with some reperfusion during the rest images cannot rule out ischemia EF 76% Clinical correlation is recommended.    Electronically signed by KALYANI Tay, 04/01/21, 11:25 AM EDT.       CARDIAC CATHETERIZATION:  Results for orders placed during the hospital encounter of 03/31/21    Cardiac Catheterization/Vascular Study    Narrative  CARDIAC CATHETERIZATION REPORT    DATE OF PROCEDURE: 4/2/2021      INDICATION FOR PROCEDURE: Abnormal stress test    PROCEDURE PERFORMED: Left heart catheterization, coronary angiography,    PROCEDURE COMMENTS:    All the risks and benefits explained with the patient informed consent was obtained from the patient.  Patient was brought to the cardiac catheterization laboratory placed on the table draped and prepped in the usual sterile fashion.  2% lidocaine was used to anesthetize the right groin area.  Using the modified Seldinger technique 5 English sheath was inserted into the right femoral artery.    5 English JL4 catheter was used to perform the selective left coronary angiography    5 English JR4 catheter was used to perform the selective right coronary angiography    Angio-Seal was placed  after exchanging five Uzbek sheath with six Uzbek sheath    Patient tolerated procedure well without any immediate complications      FINDINGS:    1. HEMODYNAMICS:  LV end-diastolic pressure 7 mmHg, /80 mmHg.    2. LEFT VENTRICULOGRAPHY: Not done patient had echocardiogram    3. CORONARY ANGIOGRAPHY:  Dominance right  Left Main: Large-caliber vessel bifurcates into LAD circumflex artery 0% stenosis  LAD: Large-caliber vessel gives rise to couple of medium caliber diagonal arteries multiple septal branches reaches the apex mild luminal irregularities noted less than 10% stenosis  CIRC: Large-caliber vessel gives rise to marginal lateral branches less than 10% stenosis  RCA: Large-caliber dominant artery gives rise to PDA and PL branches less than 5 to 10% stenosis    SUMMARY: No obstructive coronary artery disease    RECOMMENDATIONS: Evaluate for noncardiac causes of symptoms aggressive cardiac risk factor modification       OTHER:         Assessment & Plan     Principal Problem:    Atrial fibrillation with RVR  Active Problems:    Essential (primary) hypertension    Presence of cardiac pacemaker    Chronic HFrEF (heart failure with reduced ejection fraction)    Atrial flutter with rapid ventricular response       Afib with RVR  Sick sinus syndrome  s/p dual chamber (1/2023)   Recurrent AF   Continue amiodarone, beta blocker   Eliquis for anticoagulation   Plan for CV tomorrow for symptomatic relief   EP following   Monitor and replace electrolytes per protocol  TSH elevated, T4 normal     HFmEF  Valvular disease  Echocardiogram with LVEF 46-50% moderate to severe MR and TR  proBNP >10,000  Beta blocker  IV diuresis     Hypertension   Blood pressure well controlled  Continue beta blocker     CAD  Cardiac catheterization form 2021 with minimal disease   Denies chest pain   Troponin mildly elevated likely secondary to afib RVR  Lipid panel with total 107, HDL 42, LDL 49     Anemia  Occult stool ordered  H&H  8.8/29.1  On ferrlecit   GI consulted     Demetrice Gupta, APRN  07/09/24  10:31 EDT

## 2024-07-09 NOTE — DISCHARGE PLACEMENT REQUEST
"Catalina Moreno (84 y.o. Female)       Date of Birth   1940    Social Security Number       Address   [address] [place] [address] [place] [facility]    Home Phone   [phone]    MRN   [id number]       Rastafarian   None    Marital Status                               Admission Date   7/5/24    Admission Type   Emergency    Admitting Provider       Attending Provider   Zuleima Canales MD    Department, Room/Bed   [facility] 2D, 267/1       Discharge Date       Discharge Disposition       Discharge Destination                                 Attending Provider: Zuleima Canales MD    Allergies: Baclofen, Codeine, Ibuprofen, Methocarbamol, Naproxen, Tizanidine Hcl, Tolmetin    Isolation: None   Infection: None   Code Status: CPR    Ht: 160 cm (63\")   Wt: 74.8 kg (165 lb)    Admission Cmt: None   Principal Problem: Atrial fibrillation with RVR [I48.91]                   Active Insurance as of 7/5/2024       Primary Coverage       Payor Plan Insurance Group Employer/Plan Group    MEDICARE MEDICARE A & B        Payor Plan Address Payor Plan Phone Number Payor Plan Fax Number Effective Dates    [address] [phone]  4/1/2006 - None Entered    [facility] [id number]         Subscriber Name Subscriber Birth Date Member ID       CATALINA MORENO 1940 [id number]               Secondary Coverage       Payor Plan Insurance Group Employer/Plan Group    ANTHEM BLUE CROSS ANTHEM BLUE CROSS BLUE SHIELD PPO [id number]       Payor Plan Address Payor Plan Phone Number Payor Plan Fax Number Effective Dates    [address] [phone]  2/2/2022 - None Entered    [facility] [id number]         Subscriber Name Subscriber Birth Date Member ID       CATALINA MORENO 1940 [id number]               Tertiary Coverage       Payor Plan Insurance Group Employer/Plan Group    [place]        Payor Plan Address Payor Plan Phone Number Payor Plan Fax Number Effective " Dates    [address]   2022 - None Entered    [place] [address]         Subscriber Name Subscriber Birth Date Member ID       CATALINA CARRERA 1940 [id number]                     Emergency Contacts        (Rel.) Home Phone Work Phone Mobile Phone    MADHAV DEAN (Son) [phone] -- [phone]    aramis dean (Son) -- -- [phone]                 History & Physical        Christal Tubbs APRN at 24 1343       Attestation signed by Radu Nagy MD at 24 0742    I have reviewed this documentation and agree.                      [facility] Medicine Services  History & Physical    Patient Name: Catalina Carrera  : 1940  MRN: [id number]  Primary Care Physician:  Flash Gonzalez MD  Date of admission: 2024  Date and Time of Service: 2024 at 1445    Subjective      Chief Complaint: Dyspnea, frequent falls    History of Present Illness: Catalina Carrera is a 84 y.o. female with a CMH of CHF, ARIELA, hypothyroidism, GERD who presented to [facility] on 2024 with shortness of breath that developed over the last three to four weeks. Patient reports not taking one of her medications but not remembering which ones. She has been taking her Eliquis daily for atrial fibrillation. Also, patient fell 3-4 days ago at home on her tailbone. She lives alone and reports having no familial support. Says her   about 7 years ago. Current complaint of low back pain, left leg pain and left shoulder pain. Reports shortness of breath is improved. Of note, patient was admitted here 24 - 3/26/24 for A fib RVR, and a fall with closed nasal bone fracture and left ankle sprain.     On ED evaluation, patient noted to be in A-fib RVR. She was started on a Cardizem bolus and drip. HR now in 80-90s. BNP 10,939. XR of sacrum, coccyx, lumbar spine and right shoulder all negative for fracture. Troponin 18, 17. Hgb 8.9, baseline at 11.0. Cardiology consulted.  Hospitalist team to admit for further medical management.         Review of Systems   Respiratory:  Positive for shortness of breath. Negative for cough and chest tightness.    Cardiovascular:  Negative for chest pain and leg swelling.   Genitourinary:  Negative for dysuria.   Musculoskeletal:  Positive for arthralgias.       Personal History     Past Medical History:   Diagnosis Date    Acquired spondylolisthesis of lumbosacral region     Acute kidney injury 07/22/2022    Anxiety     Arthritis     Atrial fibrillation     paroxysmal    Broken shoulder     left-- due to fall 11-7-19 was at [facility]    Chronic pain disorder     Chronic systolic CHF (congestive heart failure) 01/05/2023    EF 36-40%    Coronary artery disease involving native coronary artery of native heart without angina pectoris 04/02/2021    nonobstructive    DDD (degenerative disc disease), cervical     DDD (degenerative disc disease), lumbar     Depression     Edema     9/2020 foot    GERD (gastroesophageal reflux disease)     H/O fall     Heart failure with mid-range ejection fraction 03/05/2022    EF 45% per 2D echo    Hypertension     Hypothyroidism     Insomnia     Low back pain     Moderate mitral regurgitation 01/05/2023    Neuropathy     Nonrheumatic tricuspid valve regurgitation     Pain in both feet     Polypharmacy     PONV (postoperative nausea and vomiting)     Pulmonary hypertension     Radiculopathy     Restless legs     Sacroiliitis     Sick sinus syndrome     Added automatically from request for surgery [id number]    Spondylolisthesis     Stage 3a chronic kidney disease     Urinary incontinence     Vitamin D deficiency        Past Surgical History:   Procedure Laterality Date    BACK SURGERY  07/19/2018    PDC &  PSF L3-L5 insitu    CARDIAC CATHETERIZATION N/A 4/2/2021    Procedure: Cardiac Catheterization/Vascular Study;  Surgeon: Barrett Evans MD;  Location: [facility] INVASIVE LOCATION;  Service: Cardiovascular;   Laterality: N/A;    CARDIAC ELECTROPHYSIOLOGY PROCEDURE N/A 1/17/2023    Procedure: Pacemaker DC new;  Surgeon: Baljeet Valero MD;  Location: [facility] CATH INVASIVE LOCATION;  Service: Cardiovascular;  Laterality: N/A;    CHOLECYSTECTOMY      COLONOSCOPY      ENDOSCOPY N/A 9/14/2023    Procedure: ESOPHAGOGASTRODUODENOSCOPY;  Surgeon: Ulysses Lamas MD;  Location: [facility] ENDOSCOPY;  Service: Gastroenterology;  Laterality: N/A;  gastritis, inflammatory gastric polyp    HYSTERECTOMY      ROTATOR CUFF REPAIR Left        Family History: family history includes Cancer in her father, paternal aunt, and paternal uncle; Diabetes in her son; Heart disease in her paternal aunt. Otherwise pertinent FHx was reviewed and not pertinent to current issue.    Social History:  reports that she has never smoked. She has never used smokeless tobacco. She reports that she does not drink alcohol and does not use drugs.    Home Medications:  Prior to Admission Medications       Prescriptions Last Dose Informant Patient Reported? Taking?    apixaban (ELIQUIS) 5 MG tablet tablet 7/5/2024  No Yes    Take 1 tablet by mouth Every 12 (Twelve) Hours. Indications: Atrial Fibrillation    cyclobenzaprine (FLEXERIL) 5 MG tablet   Yes No    Take 1 tablet by mouth 2 (Two) Times a Day As Needed for Muscle Spasms.    metoprolol succinate XL (TOPROL-XL) 25 MG 24 hr tablet 7/4/2024  Yes Yes    Take 1 tablet by mouth Daily.              Allergies:  Allergies   Allergen Reactions    Baclofen Rash    Codeine Nausea Only    Ibuprofen Unknown (See Comments)     Patient doesn't know----Motin    Methocarbamol Unknown (See Comments)     Patient doesn't know    Naproxen Unknown (See Comments)     Pt. Doesn't know     Tizanidine Hcl Other (See Comments)     Syncope     Tolmetin Dizziness     Pt. Doesn't know-- same as Tolectin       Objective      Vitals:   Temp:  [97.8 °F (36.6 °C)] 97.8 °F (36.6 °C)  Heart Rate:  [] 92  Resp:  [13-22] 14  BP:  (108-147)/() 108/60  Body mass index is 29.23 kg/m².  Physical Exam  Vitals and nursing note reviewed.   Constitutional:       Appearance: Normal appearance.   HENT:      Head: Normocephalic and atraumatic.      Nose: Nose normal.      Mouth/Throat:      Mouth: Mucous membranes are moist.   Eyes:      Extraocular Movements: Extraocular movements intact.      Pupils: Pupils are equal, round, and reactive to light.   Cardiovascular:      Rate and Rhythm: Normal rate and regular rhythm.      Pulses: Normal pulses.   Pulmonary:      Breath sounds: Normal breath sounds.   Abdominal:      General: Bowel sounds are normal.      Palpations: Abdomen is soft.   Musculoskeletal:         General: No swelling.      Right shoulder: Tenderness present.      Cervical back: Normal range of motion and neck supple.      Lumbar back: No signs of trauma or lacerations.      Right lower leg: No edema.      Left lower leg: No edema.   Skin:     General: Skin is warm and dry.      Capillary Refill: Capillary refill takes less than 2 seconds.   Neurological:      Mental Status: She is alert and oriented to person, place, and time.         Diagnostic Data:  Lab Results (last 24 hours)       Procedure Component Value Units Date/Time    High Sensitivity Troponin T 2Hr [[id number]]  (Abnormal) Collected: 07/05/24 1156    Specimen: Blood from Arm, Right Updated: 07/05/24 1225     HS Troponin T 17 ng/L      Troponin T Delta -1 ng/L     Narrative:      High Sensitive Troponin T Reference Range:  <14.0 ng/L- Negative Female for AMI  <22.0 ng/L- Negative Male for AMI  >=14 - Abnormal Female indicating possible myocardial injury.  >=22 - Abnormal Male indicating possible myocardial injury.   Clinicians would have to utilize clinical acumen, EKG, Troponin, and serial changes to determine if it is an Acute Myocardial Infarction or myocardial injury due to an underlying chronic condition.         Comprehensive Metabolic Panel [[id number]]   (Abnormal) Collected: 07/05/24 0953    Specimen: Blood from Arm, Left Updated: 07/05/24 1037     Glucose 106 mg/dL      BUN 26 mg/dL      Creatinine 1.15 mg/dL      Sodium 137 mmol/L      Potassium 4.4 mmol/L      Chloride 103 mmol/L      CO2 19.5 mmol/L      Calcium 9.5 mg/dL      Total Protein 7.4 g/dL      Albumin 4.5 g/dL      ALT (SGPT) 9 U/L      AST (SGOT) 24 U/L      Alkaline Phosphatase 90 U/L      Total Bilirubin 1.2 mg/dL      Globulin 2.9 gm/dL      A/G Ratio 1.6 g/dL      BUN/Creatinine Ratio 22.6     Anion Gap 14.5 mmol/L      eGFR 47.4 mL/min/1.73     Narrative:      GFR Normal >60  Chronic Kidney Disease <60  Kidney Failure <15    The GFR formula is only valid for adults with stable renal function between ages 18 and 70.    BNP [[id number]]  (Abnormal) Collected: 07/05/24 0953    Specimen: Blood from Arm, Left Updated: 07/05/24 1037     proBNP 10,939.0 pg/mL     Narrative:      This assay is used as an aid in the diagnosis of individuals suspected of having heart failure. It can be used as an aid in the diagnosis of acute decompensated heart failure (ADHF) in patients presenting with signs and symptoms of ADHF to the emergency department (ED). In addition, NT-proBNP of <300 pg/mL indicates ADHF is not likely.    Age Range Result Interpretation  NT-proBNP Concentration (pg/mL:      <50             Positive            >450                   Gray                 300-450                    Negative             <300    50-75           Positive            >900                  Gray                300-900                  Negative            <300      >75             Positive            >1800                  Gray                300-1800                  Negative            <300    High Sensitivity Troponin T [[id number]]  (Abnormal) Collected: 07/05/24 0953    Specimen: Blood from Arm, Left Updated: 07/05/24 1037     HS Troponin T 18 ng/L     Narrative:      High Sensitive Troponin T Reference Range:  <14.0  ng/L- Negative Female for AMI  <22.0 ng/L- Negative Male for AMI  >=14 - Abnormal Female indicating possible myocardial injury.  >=22 - Abnormal Male indicating possible myocardial injury.   Clinicians would have to utilize clinical acumen, EKG, Troponin, and serial changes to determine if it is an Acute Myocardial Infarction or myocardial injury due to an underlying chronic condition.         Magnesium [[id number]]  (Normal) Collected: 07/05/24 0953    Specimen: Blood from Arm, Left Updated: 07/05/24 1037     Magnesium 2.0 mg/dL     CBC & Differential [[id number]]  (Abnormal) Collected: 07/05/24 0953    Specimen: Blood from Arm, Left Updated: 07/05/24 1024    Narrative:      The following orders were created for panel order CBC & Differential.  Procedure                               Abnormality         Status                     ---------                               -----------         ------                     CBC Auto Differential[[id number]]        Abnormal            Final result                 Please view results for these tests on the individual orders.    D-dimer, Quantitative [[id number]]  (Normal) Collected: 07/05/24 0953    Specimen: Blood from Arm, Left Updated: 07/05/24 1024     D-Dimer, Quantitative 0.73 mg/L (FEU)     Narrative:      According to the assay 's published package insert, a normal (<0.50 mg/L (FEU)) D-dimer result in conjunction with a non-high clinical probability assessment, excludes deep vein thrombosis (DVT) and pulmonary embolism (PE) with high sensitivity.    D-dimer values increase with age and this can make VTE exclusion of an older population difficult. To address this, the American College of Physicians, based on best available evidence and recent guidelines, recommends that clinicians use age-adjusted D-dimer thresholds in patients greater than 50 years of age with: a) a low probability of PE who do not meet all Pulmonary Embolism Rule Out Criteria, or b) in those  "with intermediate probability of PE.   The formula for an age-adjusted D-dimer cut-off is \"age/100\".  For example, a 60 year old patient would have an age-adjusted cut-off of 0.60 mg/L (FEU) and an 80 year old 0.80 mg/L (FEU).    CBC Auto Differential [[id number]]  (Abnormal) Collected: 07/05/24 0953    Specimen: Blood from Arm, Left Updated: 07/05/24 1024     WBC 6.89 10*3/mm3      RBC 3.78 10*6/mm3      Hemoglobin 9.1 g/dL      Hematocrit 29.8 %      MCV 78.8 fL      MCH 24.1 pg      MCHC 30.5 g/dL      RDW 17.3 %      RDW-SD 48.7 fl      MPV 10.8 fL      Platelets 254 10*3/mm3      Neutrophil % 64.6 %      Lymphocyte % 25.7 %      Monocyte % 6.2 %      Eosinophil % 1.9 %      Basophil % 1.3 %      Immature Grans % 0.3 %      Neutrophils, Absolute 4.45 10*3/mm3      Lymphocytes, Absolute 1.77 10*3/mm3      Monocytes, Absolute 0.43 10*3/mm3      Eosinophils, Absolute 0.13 10*3/mm3      Basophils, Absolute 0.09 10*3/mm3      Immature Grans, Absolute 0.02 10*3/mm3      nRBC 0.0 /100 WBC     [place] Draw [[id number]] Collected: 07/05/24 0953    Specimen: Blood from Arm, Left Updated: 07/05/24 1000    Narrative:      The following orders were created for panel order [place] Draw.  Procedure                               Abnormality         Status                     ---------                               -----------         ------                     Green Top (Gel)[[id number]]                                  Final result               Lavender Top[[id number]]                                     Final result               Light Blue Top[[id number]]                                   Final result                 Please view results for these tests on the individual orders.    Green Top (Gel) [[id number]] Collected: 07/05/24 0953    Specimen: Blood from Arm, Left Updated: 07/05/24 1000     Extra Tube Hold for add-ons.     Comment: Auto resulted.       Lavender Top [[id number]] Collected: 07/05/24 0953    Specimen: Blood from " Arm, Left Updated: 07/05/24 1000     Extra Tube hold for add-on     Comment: Auto resulted       Light Blue Top [[id number]] Collected: 07/05/24 0953    Specimen: Blood from Arm, Left Updated: 07/05/24 1000     Extra Tube Hold for add-ons.     Comment: Auto resulted                Imaging Results (Last 24 Hours)       Procedure Component Value Units Date/Time    XR Sacrum & Coccyx [[id number]] Collected: 07/05/24 1149     Updated: 07/05/24 1156    Narrative:      XR SPINE LUMBAR COMPLETE 4+VW, XR SACRUM AND COCCYX    Date of Exam: 7/5/2024 11:07 AM EDT    Indication: low back pain s/p fall 3 or 4 days ago    Comparison: 3/21/2024    Findings:  Lumbar spine: 5 images. Stable mild anterolisthesis of L4 on L5 and L5 on S1. There is otherwise normal alignment. No acute fracture or subluxation identified. There is multilevel degenerative change, worst at L1-2. Mild facet arthrosis, worst from L3-4   through L5-S1. No pars defect identified. There are surgical clips in the right upper quadrant abdomen.    Sacrum and coccyx: 3 images. No evidence of displaced fracture. Evaluation somewhat limited by osteopenia.      Impression:      Impression:    1. No evidence of acute L-spine fracture or subluxation. Stable degenerative changes in spondylolisthesis.  2. No evidence of displaced sacrococcygeal fracture.      Electronically Signed: Quan Cazares MD    7/5/2024 11:54 AM EDT    Workstation ID: [id number]    XR Spine Lumbar Complete 4+VW [[id number]] Collected: 07/05/24 1149     Updated: 07/05/24 1156    Narrative:      XR SPINE LUMBAR COMPLETE 4+VW, XR SACRUM AND COCCYX    Date of Exam: 7/5/2024 11:07 AM EDT    Indication: low back pain s/p fall 3 or 4 days ago    Comparison: 3/21/2024    Findings:  Lumbar spine: 5 images. Stable mild anterolisthesis of L4 on L5 and L5 on S1. There is otherwise normal alignment. No acute fracture or subluxation identified. There is multilevel degenerative change, worst at L1-2. Mild facet  arthrosis, worst from L3-4   through L5-S1. No pars defect identified. There are surgical clips in the right upper quadrant abdomen.    Sacrum and coccyx: 3 images. No evidence of displaced fracture. Evaluation somewhat limited by osteopenia.      Impression:      Impression:    1. No evidence of acute L-spine fracture or subluxation. Stable degenerative changes in spondylolisthesis.  2. No evidence of displaced sacrococcygeal fracture.      Electronically Signed: Quan Cazares MD    7/5/2024 11:54 AM EDT    Workstation ID: [id number]    XR Chest 1 View [[id number]] Collected: 07/05/24 1142     Updated: 07/05/24 1145    Narrative:      XR CHEST 1 VW    Date of Exam: 7/5/2024 10:45 AM EDT    Indication: SOA, worsening over the last few days    Comparison: Chest radiograph 3/21/2024.    Findings:  Dual-lead left chest pacemaker. Enlarged cardiac silhouette, unchanged. Pulmonary vascular congestion with diffuse interstitial thickening. No dominant focal consolidation. Unchanged mild right hemidiaphragm elevation. No pleural effusion or   pneumothorax. Osseous structures are unchanged.      Impression:      Impression:  Cardiomegaly with pulmonary vascular congestion and mild interstitial edema. No focal consolidation or pleural disease.      Electronically Signed: Lee Paris MD    7/5/2024 11:43 AM EDT    Workstation ID: [id number]    XR Shoulder 2+ View Right [[id number]] Collected: 07/05/24 1136     Updated: 07/05/24 1144    Narrative:      XR SHOULDER 2+ VW RIGHT    Date of Exam: 7/5/2024 10:51 AM EDT    Indication: R shoulder pain for several days    Comparison: None available.    Findings:  No acute fracture or dislocation noted. Mild degenerative changes are noted at the AC joint. Mild to moderate degenerative change noted at the glenohumeral joint. Limited imaging of the chest demonstrates leads from pacemaker. Soft tissues demonstrate no   acute abnormality      Impression:      Impression:  Degenerative changes  with no definite acute osseous abnormality noted      Electronically Signed: Cesar Arzate MD    7/5/2024 11:42 AM EDT    Workstation ID: [id number]              Assessment & Plan        This is a 84 y.o. female with:    Active and Resolved Problems  Active Hospital Problems    Diagnosis  POA    **Atrial fibrillation with RVR [I48.91]  Yes      Resolved Hospital Problems   No resolved problems to display.       Atrial fibrillation with RVR  -On Cardizem, rate now 80-90  -BP stable   -Eliquis continued  -Cardiology consulted    H/O HF  - BNP 10,939  - last echo 9/7/23 with EF 51-55%  - repeat echo pending  - likely 2/2 A-fib     Tailbone/Shoulder Pain  - tylenol prn for pain   - patient was given one time dose of dilaudid in ER which caused some mild respiratory depression  - XR negative for fractures      Frequent Falls  - lives alone, no family for support  - frequent falls and admissions  - patient open to discussing assisted living facility   -PT/OT/CM consulted     Anemia  - H/H 9.1, 29.8  - Baseline H/H 11.0, 29.8  - Transfuse if <7     H/O Hypothyroidism   - TSH last admit was 8.9  - repeat TSH pending  - may need endocrinology consult  - patient denies taking synthroid        VTE Prophylaxis:  Pharmacologic VTE prophylaxis orders are signed & held.          The patient desires to be as follows:    CODE STATUS:    Level Of Support Discussed With: Patient  Code Status (Patient has no pulse and is not breathing): CPR (Attempt to Resuscitate)  Medical Interventions (Patient has pulse or is breathing): Full Support        Admission Status:  I believe this patient meets inpatient status.    Expected Length of Stay: >24 hours    PDMP and Medication Dispenses via Sidebar reviewed and consistent with patient reported medications.    I discussed the patient's findings and my recommendations with patient.      Signature:     This document has been electronically signed by KALYANI Thakkar on July 5, 2024 13:44 EDT    [facility] Hospitalist Team      Electronically signed by Radu Nagy MD at 24 0742          Physician Progress Notes (last 24 hours)        Zuleima Canales MD at 24 1341              [facility] MEDICINE SERVICE  DAILY PROGRESS NOTE    NAME: Catalina Moreno  : 1940  MRN: [id number]      LOS: 4 days     PROVIDER OF SERVICE: Zuleima Canales MD    Chief Complaint: Atrial fibrillation with RVR    Subjective:     Interval History:  History taken from: patient  Patient Complaints: Patient seen and examined at bedside this morning.  No acute complaints  Patient Denies:     Review of Systems: Negative except as described above  Review of Systems    Objective:     Vital Signs  Temp:  [97.7 °F (36.5 °C)-98.3 °F (36.8 °C)] 98 °F (36.7 °C)  Heart Rate:  [] 94  Resp:  [14-17] 15  BP: ()/(45-93) 111/93  Flow (L/min):  [1-2] 2   Body mass index is 29.23 kg/m².    Physical Exam Physical Exam Eyes:      Pupils: Pupils are equal, round, and reactive to light.   Cardiovascular:      Rate and Rhythm: Normal rate. Rhythm irregular.   Pulmonary:      Breath sounds: Normal breath sounds.   Abdominal:      General: Bowel sounds are normal.   Musculoskeletal:         General: Normal range of motion.   Neurological:      General: No focal deficit present.      Mental Status: She is alert and oriented to person, place, and time.   Physical Exam    Scheduled Meds   amiodarone, 200 mg, Oral, Q8H   Followed by  [START ON 2024] amiodarone, 200 mg, Oral, Q12H   Followed by  [START ON 2024] amiodarone, 200 mg, Oral, Daily  [Held by provider] apixaban, 5 mg, Oral, Q12H  [START ON 7/10/2024] bisacodyl, 10 mg, Oral, Once  furosemide, 40 mg, Intravenous, BID  metoprolol succinate XL, 25 mg, Oral, Q24H  [START ON 7/10/2024] pantoprazole, 40 mg, Intravenous, Q AM  pregabalin, 50 mg, Oral, Q8H  sodium-potassium-magnesium sulfates, 1 bottle, Oral, Q12H       PRN Meds      acetaminophen **OR** acetaminophen **OR** acetaminophen    senna-docusate sodium **AND** polyethylene glycol **AND** bisacodyl **AND** bisacodyl    Calcium Replacement - Follow Nurse / BPA Driven Protocol    HYDROcodone-acetaminophen    Magnesium Standard Dose Replacement - Follow Nurse / BPA Driven Protocol    melatonin    nitroglycerin    ondansetron ODT **OR** ondansetron    Phosphorus Replacement - Follow Nurse / BPA Driven Protocol    Potassium Replacement - Follow Nurse / BPA Driven Protocol    sodium chloride    [COMPLETED] Insert Peripheral IV **AND** sodium chloride   Infusions         Diagnostic Data    Results from last 7 days   Lab Units 07/09/24  0204 07/06/24  0358 07/05/24  0953   WBC 10*3/mm3 6.59   < > 6.89   HEMOGLOBIN g/dL 8.8*   < > 9.1*   HEMATOCRIT % 29.1*   < > 29.8*   PLATELETS 10*3/mm3 248   < > 254   GLUCOSE mg/dL 109*   < > 106*   CREATININE mg/dL 1.25*   < > 1.15*   BUN mg/dL 16   < > 26*   SODIUM mmol/L 135*   < > 137   POTASSIUM mmol/L 4.1   < > 4.4   AST (SGOT) U/L  --   --  24   ALT (SGPT) U/L  --   --  9   ALK PHOS U/L  --   --  90   BILIRUBIN mg/dL  --   --  1.2   ANION GAP mmol/L 12.2   < > 14.5    < > = values in this interval not displayed.       No radiology results for the last day      I reviewed the patient's new clinical results.    Assessment/Plan:     Active and Resolved Problems  Active Hospital Problems    Diagnosis  POA    **Atrial fibrillation with RVR [I48.91]  Yes    Iron deficiency anemia [D50.9]  Unknown    Atrial flutter with rapid ventricular response [I48.92]  Yes    Chronic HFrEF (heart failure with reduced ejection fraction) [I50.22]  Yes    Presence of cardiac pacemaker [Z95.0]  Yes    Essential (primary) hypertension [I10]  Yes      Resolved Hospital Problems   No resolved problems to display.       Atrial fibrillation with RVR  -Was initially on Cardizem, discontinued and started on IV amiodarone as per cardiology  - TTE on 7/6 at 24 showed LVEF 46 to 50%  with severe dilation of right and left atrial cavity  -BP stable   -Chadsvasc 3+ was on Eliquis however paused by GI as she is scheduled to undergo EGD/colonoscopy tomorrow  -Cardiology consulted, restarted beta-blocker and switched amnio IV to p.o.   -Scheduled for cardioversion tomorrow with cardiology  - TSH elevated, free T4 normal     H/O HFrEF  - BNP 10,939  - last echo 9/7/23 with EF 51-55%  -TTE shows EF of 46 to 50% with moderate to severe MR and TR  - CXR shows cardiomegaly with pulmonary vascular congestion, will administer a few doses of Lasix however will need to be cautious of blood pressure   -monitor I's and O's        Frequent Falls  - lives alone, no family for support  - frequent falls and admissions  - patient open to discussing assisted living facility   -PT/OT/CM consulted      Neuropathic pain/RLS  Lyrica ordered, with underlying iron deficiency anemia we will need to replete iron stores before making any changes to her medication to assess for true response  A1c 6.27  vitamin D Low, will start on supplementation     Iron deficiency anemia  - Iron panel reviewed, will start patient on IV iron supplementation    - scheduled for EGD/colonoscopy with GI tomorrow  - Transfuse if <7      Subclinical hypothyroidism  - TSH 5.75, free T4 WNL         VTE Prophylaxis:  Pharmacologic VTE prophylaxis orders are present.         Code status is   Code Status and Medical Interventions:   Ordered at: 07/05/24 1342     Level Of Support Discussed With:    Patient     Code Status (Patient has no pulse and is not breathing):    CPR (Attempt to Resuscitate)     Medical Interventions (Patient has pulse or is breathing):    Full Support         Time: 30 minutes    Signature: Electronically signed by Zuleima Canales MD, 07/09/24, 13:41 EDT.  [facility] Hospitalist Team      Electronically signed by Zuleima Canales MD at 07/09/24 1348       Demetrice Gupta APRN at 07/09/24 1031          CARDIOLOGY  PROGRESS NOTE:    Catalina Moreno  84 y.o.  female  1940  [id number]      Referring Provider: Christal Tubbs APRN    Reason for follow-up: afib with RVR      Patient Care Team:  Flash Gonzalez MD as PCP - General (Cardiology)  Flash Gonzalez MD as Consulting Physician (Cardiology)    Subjective  Patient seen and examined. Labs and chart reviewed. Patient complaining of weakness. Denies chest pain/pressure    Objective  Patient lying in bed resting on 1 L NC      Review of Systems   Constitutional: Negative for chills and fever.   Cardiovascular:  Negative for chest pain, leg swelling and syncope.   Respiratory:  Negative for cough and shortness of breath.    Gastrointestinal:  Negative for abdominal pain, nausea and vomiting.   Neurological:  Positive for weakness. Negative for dizziness, headaches and light-headedness.       Allergies: Baclofen, Codeine, Ibuprofen, Methocarbamol, Naproxen, Tizanidine hcl, and Tolmetin    Scheduled Meds:amiodarone, 200 mg, Oral, Q8H   Followed by  [START ON 7/23/2024] amiodarone, 200 mg, Oral, Q12H   Followed by  [START ON 8/6/2024] amiodarone, 200 mg, Oral, Daily  apixaban, 5 mg, Oral, Q12H  furosemide, 40 mg, Intravenous, BID  metoprolol succinate XL, 25 mg, Oral, Q24H  pregabalin, 50 mg, Oral, Q8H      Continuous Infusions:     PRN Meds:.  acetaminophen **OR** acetaminophen **OR** acetaminophen    senna-docusate sodium **AND** polyethylene glycol **AND** bisacodyl **AND** bisacodyl    Calcium Replacement - Follow Nurse / BPA Driven Protocol    HYDROcodone-acetaminophen    Magnesium Standard Dose Replacement - Follow Nurse / BPA Driven Protocol    melatonin    nitroglycerin    ondansetron ODT **OR** ondansetron    Phosphorus Replacement - Follow Nurse / BPA Driven Protocol    Potassium Replacement - Follow Nurse / BPA Driven Protocol    sodium chloride    [COMPLETED] Insert Peripheral IV **AND** sodium chloride        VITAL SIGNS  Vitals:    07/09/24 0030 07/09/24 0133  "07/09/24 0350 07/09/24 0755   BP: 95/45 116/54 110/75 124/71   BP Location: Right arm  Right arm Right arm   Patient Position: Lying  Lying Lying   Pulse: 82 104 112 105   Resp: 16  16 17   Temp: 98.3 °F (36.8 °C)  98.3 °F (36.8 °C) 97.8 °F (36.6 °C)   TempSrc: Oral  Oral Oral   SpO2: 96% 98% 99% 94%   Weight:       Height:           Flowsheet Rows      Flowsheet Row First Filed Value   Admission Height 160 cm (63\") Documented at 07/05/2024 0950   Admission Weight 74.8 kg (165 lb) Documented at 07/05/2024 0950             TELEMETRY: atrial fibrillation with controlled ventricular response     Physical Exam:  Vitals reviewed.   Constitutional:       Appearance: Overweight and not in distress.   Eyes:      Pupils: Pupils are equal, round, and reactive to light.   HENT:    Mouth/Throat:      Pharynx: Oropharynx is clear.   Pulmonary:      Effort: Pulmonary effort is normal.      Breath sounds: Normal breath sounds.   Cardiovascular:      Normal rate. Irregularly irregular rhythm.   Pulses:     Intact distal pulses.   Abdominal:      Palpations: Abdomen is soft.   Musculoskeletal: Normal range of motion.      Cervical back: Normal range of motion and neck supple. Skin:     General: Skin is warm and dry.   Neurological:      General: No focal deficit present.      Mental Status: Alert.          Results Review:   I reviewed the patient's new clinical results.  Lab Results (last 24 hours)       Procedure Component Value Units Date/Time    CBC & Differential [[id number]]  (Abnormal) Collected: 07/09/24 0204    Specimen: Blood from Arm, Right Updated: 07/09/24 0616    Narrative:      The following orders were created for panel order CBC & Differential.  Procedure                               Abnormality         Status                     ---------                               -----------         ------                     CBC Auto Differential[[id number]]        Abnormal            Final result               Scan " Slide[[id number]]                                       Final result                 Please view results for these tests on the individual orders.    CBC Auto Differential [[id number]]  (Abnormal) Collected: 07/09/24 0204    Specimen: Blood from Arm, Right Updated: 07/09/24 0616     WBC 6.59 10*3/mm3      RBC 3.61 10*6/mm3      Hemoglobin 8.8 g/dL      Hematocrit 29.1 %      MCV 80.6 fL      MCH 24.4 pg      MCHC 30.2 g/dL      RDW 18.6 %      RDW-SD 51.6 fl      MPV 10.4 fL      Platelets 248 10*3/mm3     Narrative:      The previously reported component NRBC is no longer being reported. Previous result was 0.0 /100 WBC (Reference Range: 0.0-0.2 /100 WBC) on 7/9/2024 at 0306 EDT.    Scan Slide [[id number]] Collected: 07/09/24 0204    Specimen: Blood from Arm, Right Updated: 07/09/24 0616     Scan Slide --     Comment: See Manual Differential Results       Manual Differential [[id number]]  (Normal) Collected: 07/09/24 0204    Specimen: Blood from Arm, Right Updated: 07/09/24 0616     Neutrophil % 53.0 %      Lymphocyte % 39.0 %      Monocyte % 6.0 %      Eosinophil % 1.0 %      Bands %  1.0 %      Neutrophils Absolute 3.56 10*3/mm3      Lymphocytes Absolute 2.57 10*3/mm3      Monocytes Absolute 0.40 10*3/mm3      Eosinophils Absolute 0.07 10*3/mm3      RBC Morphology Normal     WBC Morphology Normal     Platelet Morphology Normal    Basic Metabolic Panel [[id number]]  (Abnormal) Collected: 07/09/24 0204    Specimen: Blood from Arm, Right Updated: 07/09/24 0236     Glucose 109 mg/dL      BUN 16 mg/dL      Creatinine 1.25 mg/dL      Sodium 135 mmol/L      Potassium 4.1 mmol/L      Chloride 97 mmol/L      CO2 25.8 mmol/L      Calcium 8.1 mg/dL      BUN/Creatinine Ratio 12.8     Anion Gap 12.2 mmol/L      eGFR 42.9 mL/min/1.73     Narrative:      GFR Normal >60  Chronic Kidney Disease <60  Kidney Failure <15    The GFR formula is only valid for adults with stable renal function between ages 18 and 70.    Magnesium  [[id number]]  (Normal) Collected: 07/09/24 0204    Specimen: Blood from Arm, Right Updated: 07/09/24 0236     Magnesium 1.9 mg/dL     Phosphorus [[id number]]  (Normal) Collected: 07/09/24 0204    Specimen: Blood from Arm, Right Updated: 07/09/24 0236     Phosphorus 2.9 mg/dL     Vitamin D,25-Hydroxy [[id number]]  (Abnormal) Collected: 07/08/24 1026    Specimen: Blood from Arm, Left Updated: 07/08/24 1712     25 Hydroxy, Vitamin D 21.2 ng/ml     Narrative:      Reference Range for Total Vitamin D 25(OH)     Deficiency <20.0 ng/mL   Insufficiency 21-29 ng/mL   Sufficiency  ng/mL  Toxicity >100 ng/ml      Potassium [[id number]]  (Normal) Collected: 07/08/24 1026    Specimen: Blood from Arm, Left Updated: 07/08/24 1121     Potassium 3.8 mmol/L      Comment: Specimen hemolyzed.  Result may be falsely elevated.       Hemoglobin A1c [[id number]]  (Abnormal) Collected: 07/08/24 1026    Specimen: Blood from Arm, Left Updated: 07/08/24 1059     Hemoglobin A1C 6.27 %             Imaging Results (Last 24 Hours)       ** No results found for the last 24 hours. **            EKG        I personally viewed and interpreted the patient's EKG/Telemetry data:    ECHOCARDIOGRAM:  Results for orders placed during the hospital encounter of 07/05/24    Adult Transthoracic Echo Complete w/ Color, Spectral and Contrast if necessary per protocol    Interpretation Summary    Left ventricular ejection fraction appears to be 46 - 50%.    Left ventricular wall thickness is consistent with moderate concentric hypertrophy.    The left atrial cavity is severely dilated.    The right atrial cavity is moderate to severely  dilated.    Abnormal mitral valve structure consistent with dilated annulus.    Moderate to severe mitral valve regurgitation is present with a centrally-directed jet noted.    Moderate to severe tricuspid valve regurgitation is present.    Estimated right ventricular systolic pressure from tricuspid regurgitation is moderately  elevated (45-55 mmHg).    Transthoracic echocardiography reveals moderate LVH with overall EF between 45 to 50%  Moderate to severe central mitral regurgitation and moderate to severe TR with moderate pulm hypertension with mild to moderate AI.  Severe left atrial enlargement and moderate right atrial enlargement and no effusion.    Electronically signed by Baljeet Valero MD, 07/06/24, 3:26 PM EDT.       STRESS MYOVIEW:  Results for orders placed during the hospital encounter of 03/31/21    Stress Test With Myocardial Perfusion One Day    Interpretation Summary  · Findings consistent with a normal ECG stress test.  · Left ventricular ejection fraction is hyperdynamic (Calculated EF > 70%). .  · Impressions are consistent with a study indicating uncertain risk.  · Large apical defect with some reperfusion during the rest images cannot rule out ischemia EF 76% Clinical correlation is recommended.    Electronically signed by KALYANI Tay, 04/01/21, 11:25 AM EDT.       CARDIAC CATHETERIZATION:  Results for orders placed during the hospital encounter of 03/31/21    Cardiac Catheterization/Vascular Study    Narrative  CARDIAC CATHETERIZATION REPORT    DATE OF PROCEDURE: 4/2/2021      INDICATION FOR PROCEDURE: Abnormal stress test    PROCEDURE PERFORMED: Left heart catheterization, coronary angiography,    PROCEDURE COMMENTS:    All the risks and benefits explained with the patient informed consent was obtained from the patient.  Patient was brought to the cardiac catheterization laboratory placed on the table draped and prepped in the usual sterile fashion.  2% lidocaine was used to anesthetize the right groin area.  Using the modified Seldinger technique 5 Uzbek sheath was inserted into the right femoral artery.    5 Uzbek JL4 catheter was used to perform the selective left coronary angiography    5 Uzbek JR4 catheter was used to perform the selective right coronary angiography    Angio-Seal was placed  after exchanging five Kazakh sheath with six Kazakh sheath    Patient tolerated procedure well without any immediate complications      FINDINGS:    1. HEMODYNAMICS:  LV end-diastolic pressure 7 mmHg, /80 mmHg.    2. LEFT VENTRICULOGRAPHY: Not done patient had echocardiogram    3. CORONARY ANGIOGRAPHY:  Dominance right  Left Main: Large-caliber vessel bifurcates into LAD circumflex artery 0% stenosis  LAD: Large-caliber vessel gives rise to couple of medium caliber diagonal arteries multiple septal branches reaches the apex mild luminal irregularities noted less than 10% stenosis  CIRC: Large-caliber vessel gives rise to marginal lateral branches less than 10% stenosis  RCA: Large-caliber dominant artery gives rise to PDA and PL branches less than 5 to 10% stenosis    SUMMARY: No obstructive coronary artery disease    RECOMMENDATIONS: Evaluate for noncardiac causes of symptoms aggressive cardiac risk factor modification       OTHER:         Assessment & Plan     Principal Problem:    Atrial fibrillation with RVR  Active Problems:    Essential (primary) hypertension    Presence of cardiac pacemaker    Chronic HFrEF (heart failure with reduced ejection fraction)    Atrial flutter with rapid ventricular response       Afib with RVR  Sick sinus syndrome  s/p dual chamber (1/2023)   Recurrent AF   Continue amiodarone, beta blocker   Eliquis for anticoagulation   Plan for CV tomorrow for symptomatic relief   EP following   Monitor and replace electrolytes per protocol  TSH elevated, T4 normal     HFmEF  Valvular disease  Echocardiogram with LVEF 46-50% moderate to severe MR and TR  proBNP >10,000  Beta blocker  IV diuresis     Hypertension   Blood pressure well controlled  Continue beta blocker     CAD  Cardiac catheterization form 2021 with minimal disease   Denies chest pain   Troponin mildly elevated likely secondary to afib RVR  Lipid panel with total 107, HDL 42, LDL 49     Anemia  Occult stool ordered  H&H  8.8/29.1  On ferrlecit   GI consulted     KALYANI Greene  07/09/24  10:31 EDT                Electronically signed by Demetrice Gupta APRN at 07/09/24 1038          Consult Notes (last 24 hours)        Jessica Felix APRN at 07/09/24 1110        Consult Orders    1. Inpatient Gastroenterology Consult [[id number]] ordered by Baljeet Valero MD at 07/09/24 0830                 GI CONSULT  NOTE:    Referring Provider:  Dr. Valero    Chief complaint: Anemia    Subjective .     History of present illness: Catalina Moreno is a 84 y.o. female with history of A-fib on Eliquis, CHF, CAD, SSS s/p pacemaker, cholecystectomy, and hysterectomy who presented on 7/5 with complaints of dyspnea and frequent falls.  She was found to be in A-fib with RVR and was started on Cardizem drip.  Cardiology following.  GI has been asked to consult due to anemia.  The patient denies any bright red blood per rectum or melena.  States that she has been having regular bowel movements.  Denies any abdominal pain.  Has had some recent nausea, but denies any vomiting.  No heartburn or dysphagia.  Admits to taking Advil as needed, but denies daily NSAID use.  No unintentional weight loss.      Endo History:  9/2023 EGD (Dr. Lamas) -1 cm semipedunculated inflammatory polyp in the gastric antrum, nonerosive gastritis  Patient reports more recent colonoscopy by Dr. Ortiz in [place].  9/2019 colonoscopy (Dr. Schaffer) -polyp (TA), 1.2 cm polyp not removed due to anticoagulation, grade 2 internal hemorrhoids  7/2012 EGD -bile gastritis    Past Medical History:  Past Medical History:   Diagnosis Date    Acquired spondylolisthesis of lumbosacral region     Acute kidney injury 07/22/2022    Anxiety     Arthritis     Atrial fibrillation     paroxysmal    Broken shoulder     left-- due to fall 11-7-19 was at [facility]    Chronic pain disorder     Chronic systolic CHF (congestive heart failure) 01/05/2023    EF 36-40%    Coronary artery disease  involving native coronary artery of native heart without angina pectoris 04/02/2021    nonobstructive    DDD (degenerative disc disease), cervical     DDD (degenerative disc disease), lumbar     Depression     Edema     9/2020 foot    GERD (gastroesophageal reflux disease)     H/O fall     Heart failure with mid-range ejection fraction 03/05/2022    EF 45% per 2D echo    Hypertension     Hypothyroidism     Insomnia     Low back pain     Moderate mitral regurgitation 01/05/2023    Neuropathy     Nonrheumatic tricuspid valve regurgitation     Pain in both feet     Polypharmacy     PONV (postoperative nausea and vomiting)     Pulmonary hypertension     Radiculopathy     Restless legs     Sacroiliitis     Sick sinus syndrome     Added automatically from request for surgery [id number]    Spondylolisthesis     Stage 3a chronic kidney disease     Urinary incontinence     Vitamin D deficiency        Past Surgical History:  Past Surgical History:   Procedure Laterality Date    BACK SURGERY  07/19/2018    PDC &  PSF L3-L5 insitu    CARDIAC CATHETERIZATION N/A 4/2/2021    Procedure: Cardiac Catheterization/Vascular Study;  Surgeon: Barrett Evans MD;  Location: [facility] CATH INVASIVE LOCATION;  Service: Cardiovascular;  Laterality: N/A;    CARDIAC ELECTROPHYSIOLOGY PROCEDURE N/A 1/17/2023    Procedure: Pacemaker DC new;  Surgeon: Baljeet Valero MD;  Location: [facility] CATH INVASIVE LOCATION;  Service: Cardiovascular;  Laterality: N/A;    CHOLECYSTECTOMY      COLONOSCOPY      ENDOSCOPY N/A 9/14/2023    Procedure: ESOPHAGOGASTRODUODENOSCOPY;  Surgeon: Ulysses Lamas MD;  Location: [facility] ENDOSCOPY;  Service: Gastroenterology;  Laterality: N/A;  gastritis, inflammatory gastric polyp    HYSTERECTOMY      ROTATOR CUFF REPAIR Left        Social History:  Social History     Tobacco Use    Smoking status: Never     Passive exposure: Never    Smokeless tobacco: Never   Vaping Use    Vaping status: Never Used    Substance Use Topics    Alcohol use: No    Drug use: Never       Family History:  Family History   Problem Relation Age of Onset    Cancer Father     Diabetes Son     Cancer Paternal Aunt     Heart disease Paternal Aunt     Cancer Paternal Uncle        Medications:  Medications Prior to Admission   Medication Sig Dispense Refill Last Dose    melatonin 3 MG tablet Take 1 tablet by mouth Every Night.   Past Week    metoprolol succinate XL (TOPROL-XL) 25 MG 24 hr tablet Take 1 tablet by mouth Daily.   7/4/2024    cyclobenzaprine (FLEXERIL) 5 MG tablet Take 1 tablet by mouth 2 (Two) Times a Day As Needed for Muscle Spasms.          Scheduled Meds:amiodarone, 200 mg, Oral, Q8H   Followed by  [START ON 7/23/2024] amiodarone, 200 mg, Oral, Q12H   Followed by  [START ON 8/6/2024] amiodarone, 200 mg, Oral, Daily  apixaban, 5 mg, Oral, Q12H  furosemide, 40 mg, Intravenous, BID  metoprolol succinate XL, 25 mg, Oral, Q24H  pregabalin, 50 mg, Oral, Q8H      Continuous Infusions:   PRN Meds:.  acetaminophen **OR** acetaminophen **OR** acetaminophen    senna-docusate sodium **AND** polyethylene glycol **AND** bisacodyl **AND** bisacodyl    Calcium Replacement - Follow Nurse / BPA Driven Protocol    HYDROcodone-acetaminophen    Magnesium Standard Dose Replacement - Follow Nurse / BPA Driven Protocol    melatonin    nitroglycerin    ondansetron ODT **OR** ondansetron    Phosphorus Replacement - Follow Nurse / BPA Driven Protocol    Potassium Replacement - Follow Nurse / BPA Driven Protocol    sodium chloride    [COMPLETED] Insert Peripheral IV **AND** sodium chloride    ALLERGIES:  Baclofen, Codeine, Ibuprofen, Methocarbamol, Naproxen, Tizanidine hcl, and Tolmetin    ROS:  The following systems were reviewed and negative;   Constitution:  No fevers, chills, no unintentional weight loss  Skin: no rash, no jaundice  Eyes:  No blurry vision, no eye pain  HENT:  No change in hearing or smell  Resp:  No dyspnea or cough  CV:  No chest  pain or palpitations  :  No dysuria, hematuria  Musculoskeletal:  No leg cramps or arthralgias  Neuro:  No tremor, no numbness  Psych:  No depression or confusion    Objective     Vital Signs:   Vitals:    07/09/24 0030 07/09/24 0133 07/09/24 0350 07/09/24 0755   BP: 95/45 116/54 110/75 124/71   BP Location: Right arm  Right arm Right arm   Patient Position: Lying  Lying Lying   Pulse: 82 104 112 105   Resp: 16  16 17   Temp: 98.3 °F (36.8 °C)  98.3 °F (36.8 °C) 97.8 °F (36.6 °C)   TempSrc: Oral  Oral Oral   SpO2: 96% 98% 99% 94%   Weight:       Height:           Physical Exam:       General Appearance:    Awake and alert, in no acute distress   Head:    Normocephalic, without obvious abnormality, atraumatic   Throat:   No oral lesions, no thrush, oral mucosa moist   Lungs:     Respirations regular, even and unlabored   Chest Wall:    No abnormalities observed   Abdomen:     Soft, non-tender, no rebound or guarding, non-distended   Rectal:     Deferred   Extremities:   Moves all extremities, no edema, no cyanosis   Pulses:   Pulses palpable and equal bilaterally   Skin:   No rash, no jaundice, normal palpation   Lymph nodes:   No cervical, supraclavicular or submandibular palpable adenopathy   Neurologic:   Cranial nerves 2 - 12 grossly intact, no asterixis       Results Review:   I reviewed the patient's labs and imaging.  CBC    Results from last 7 days   Lab Units 07/09/24  0204 07/08/24  0309 07/07/24  0457 07/06/24  0358 07/05/24  0953   WBC 10*3/mm3 6.59 6.86 7.79 6.59 6.89   HEMOGLOBIN g/dL 8.8* 9.4* 9.6* 8.5* 9.1*   PLATELETS 10*3/mm3 248 260 251 231 254     CMP   Results from last 7 days   Lab Units 07/09/24  0204 07/08/24  1026 07/08/24  0309 07/07/24  1544 07/07/24  0457 07/06/24  0358 07/05/24  0953   SODIUM mmol/L 135*  --  139  --  141 138 137   POTASSIUM mmol/L 4.1 3.8 3.1*  --  3.8 4.0 4.4   CHLORIDE mmol/L 97*  --  100  --  105 106 103   CO2 mmol/L 25.8  --  27.9  --  26.2 20.7* 19.5*   BUN mg/dL  "16  --  14  --  20 23 26*   CREATININE mg/dL 1.25*  --  1.05*  --  1.09* 1.07* 1.15*   GLUCOSE mg/dL 109*  --  105*  --  125* 90 106*   ALBUMIN g/dL  --   --   --   --   --   --  4.5   BILIRUBIN mg/dL  --   --   --   --   --   --  1.2   ALK PHOS U/L  --   --   --   --   --   --  90   AST (SGOT) U/L  --   --   --   --   --   --  24   ALT (SGPT) U/L  --   --   --   --   --   --  9   MAGNESIUM mg/dL 1.9  --  1.8 1.7  --   --  2.0   PHOSPHORUS mg/dL 2.9  --  3.6  --   --   --   --      Cr Clearance Estimated Creatinine Clearance: 33.1 mL/min (A) (by C-G formula based on SCr of 1.25 mg/dL (H)).  Coag     HbA1C   Lab Results   Component Value Date    HGBA1C 6.27 (H) 07/08/2024    HGBA1C 5.80 (H) 09/18/2023    HGBA1C 5.9 (H) 01/05/2023     Blood Glucose No results found for: \"POCGLU\"  Infection     UA      Imaging Results (Last 72 Hours)       ** No results found for the last 72 hours. **            ASSESSMENT:  -Iron deficiency anemia  -Nausea  -A-fib with RVR -on Eliquis  -ARIELA  -CHF  -CAD  -SSS s/p pacemaker  -History of cholecystectomy  -History of hysterectomy    PLAN:  Patient is an 84-year-old female with history of A-fib on Eliquis, CHF, CAD, and SSS s/p pacemaker who presented on 7/5 with complaints of dyspnea.  Found to be in A-fib with RVR.  Cardiology following.  GI has been asked to consult due to anemia.    Hemoglobin 8.8 from 9.4.  Continue to monitor H/H and transfuse as needed.  Review of records shows that hemoglobin was 11 in 3/2024.  Serum iron 24, iron saturation 5%, ferritin 37.  Agree with IV iron infusion.  We will plan EGD/colonoscopy tomorrow for further evaluation.  Clear liquid diet.  N.p.o. following completion of bowel prep.  Hold Eliquis.  Start PPI.  Antiemetics as needed.  Further recommendations to follow endoscopy.  Supportive care.       I discussed the patients findings and my recommendations with the patient.  I will discuss case with Dr. Vanegas and change plan accordingly.    We " "appreciate the referral.    Electronically signed by KALYANI Dowell, 07/09/24, 11:10 AM EDT.                  Electronically signed by Jessica Felix APRN at 07/09/24 1118       Physical Therapy Notes (last 24 hours)  Notes from 07/08/24 1522 through 07/09/24 1522   No notes exist for this encounter.          Occupational Therapy Notes (last 24 hours)        More Moreno OT at 07/08/24 1637          Goal Outcome Evaluation:   Assessment: Catalina Moreno presents with ADL impairments affecting function including endurance / activity tolerance. Pt symptomatic with light-headness, varying HR & s/s of orthostatic hypotension. Discussed safety awareness related to these s/s. Informed RN of her status. Demonstrated functioning below baseline abilities indicate the need for continued skilled intervention while inpatient. Tolerating session today without incident. Will continue to follow and progress as tolerated.     Plan/Recommendations:   Low Intensity Therapy recommended post-acute care - This is recommended as therapy feels this patient would require 2-3 visits per week. OP or HH would be the best option depending on patient's home bound status. Consider, if the patient has other  \"skilled\" needs such as wounds, IV antibiotics, etc. Combined with \"low intensity\" could also equate to a SNF. If patient is medically complex, consider LTAC.. Pt requires no DME at discharge.     Pt desires Home with Home Health at discharge. Pt cooperative; agreeable to therapeutic recommendations and plan of care.                                                Electronically signed by More Moreno OT at 07/08/24 1642       More Moreno OT at 07/08/24 1625          Subjective: Pt agreeable to therapeutic plan of care. Pt stating she doesn't feel very well today, but agreed to participate with therapy.   Cognition: oriented to Person, Place, Time, and Situation    Objective:     Bed Mobility: " "Independent   Functional Transfers: CGA     Balance: sitting EOB SBA; standing = CGA      Toileting: SBA  ADL Position: unsupported sitting  ADL Comments: [facility]    Vitals: Orthostatic. Pt c/o feeling light-headed in standing. Pt's BP in sitting = 127/68. BP dec to 108/63 in standing with pt c/o light headedness. Pt sat back down & Les were elevated. BP inc to 113/72.Her HR inc from 92 to 126 as well.  Pt began to feel better after a few minutes. RN informed of pt's s/s.     Pain: 0 VEducation: Provided education on the importance of mobility in the acute care setting, Verbal/Tactile Cues, ADL training, and Transfer Training      Assessment: Catalinawilber Moreno presents with ADL impairments affecting function including endurance / activity tolerance. Pt symptomatic with light-headness, varying HR & s/s of orthostatic hypotension. Discussed safety awareness related to these s/s. Informed RN of her status. Demonstrated functioning below baseline abilities indicate the need for continued skilled intervention while inpatient. Tolerating session today without incident. Will continue to follow and progress as tolerated.     Plan/Recommendations:   Low Intensity Therapy recommended post-acute care - This is recommended as therapy feels this patient would require 2-3 visits per week. OP or HH would be the best option depending on patient's home bound status. Consider, if the patient has other  \"skilled\" needs such as wounds, IV antibiotics, etc. Combined with \"low intensity\" could also equate to a SNF. If patient is medically complex, consider LTAC.. Pt requires no DME at discharge.     Pt desires Home with Home Health at discharge. Pt cooperative; agreeable to therapeutic recommendations and plan of care.     Modified Derek: N/A = No pre-op stroke/TIA    Post-Tx Position: Up in Chair, Alarms activated, and Call light and personal items within reach  PPE: gloves      Electronically signed by More Moreno, OT at " 07/08/24 [id number]

## 2024-07-09 NOTE — THERAPY TREATMENT NOTE
"Subjective: Pt agreeable to therapeutic plan of care.  Cognition: oriented to Person, Place, and Time    Objective:     Bed Mobility: N/A or Not attempted.   Functional Transfers: SBA     Balance: supported, static, and standing SBA  Functional Ambulation: SBA    Toileting: Supervision and SBA  ADL Position: supported sitting and supported standing    Vitals: WNL    Pain: 5 VAS  Location: abdomen  Interventions for pain: Repositioned  Education: Provided education on the importance of mobility in the acute care setting      Assessment: Catalina Moreno presents with ADL impairments affecting function including balance, endurance / activity tolerance, pain, and strength. Demonstrated functioning below baseline abilities indicate the need for continued skilled intervention while inpatient. Tolerating session today without incident. Will continue to follow and progress as tolerated.     Plan/Recommendations:   Low Intensity Therapy recommended post-acute care - This is recommended as therapy feels this patient would require 2-3 visits per week. OP or HH would be the best option depending on patient's home bound status. Consider, if the patient has other  \"skilled\" needs such as wounds, IV antibiotics, etc. Combined with \"low intensity\" could also equate to a SNF. If patient is medically complex, consider LTAC.. Pt requires no DME at discharge.     Pt desires Home with Home Health and Home with family assist at discharge. Pt cooperative; agreeable to therapeutic recommendations and plan of care.     Modified Derek: N/A = No pre-op stroke/TIA    Post-Tx Position: Up in Chair  PPE: gloves    "

## 2024-07-09 NOTE — PLAN OF CARE
Goal Outcome Evaluation:  Plan of Care Reviewed With: patient        Problem: Adult Inpatient Plan of Care  Goal: Plan of Care Review  Outcome: Ongoing, Progressing  Flowsheets (Taken 7/9/2024 7573)  Progress: no change  Plan of Care Reviewed With: patient     Progress: no change

## 2024-07-09 NOTE — PLAN OF CARE
Goal Outcome Evaluation:      Pt. Demonstrates progress w/ functional mobility this date, ambulatory transfer to and from bathroom w/ supervision for safety + rolling walker support, setup assist for toileting ADLs. Pt. C/o continued weakness, likely due to poor PO intake w/ colonoscopy/EGD planned tomorrow. Will follow up w/ pt. 1-3x per week at [facility].

## 2024-07-09 NOTE — PROGRESS NOTES
[facility] MEDICINE SERVICE  DAILY PROGRESS NOTE    NAME: Catalina Moreno  : 1940  MRN: [id number]      LOS: 4 days     PROVIDER OF SERVICE: Zuleima Canales MD    Chief Complaint: Atrial fibrillation with RVR    Subjective:     Interval History:  History taken from: patient  Patient Complaints: Patient seen and examined at bedside this morning.  No acute complaints  Patient Denies:     Review of Systems: Negative except as described above  Review of Systems    Objective:     Vital Signs  Temp:  [97.7 °F (36.5 °C)-98.3 °F (36.8 °C)] 98 °F (36.7 °C)  Heart Rate:  [] 94  Resp:  [14-17] 15  BP: ()/(45-93) 111/93  Flow (L/min):  [1-2] 2   Body mass index is 29.23 kg/m².    Physical Exam Physical Exam Eyes:      Pupils: Pupils are equal, round, and reactive to light.   Cardiovascular:      Rate and Rhythm: Normal rate. Rhythm irregular.   Pulmonary:      Breath sounds: Normal breath sounds.   Abdominal:      General: Bowel sounds are normal.   Musculoskeletal:         General: Normal range of motion.   Neurological:      General: No focal deficit present.      Mental Status: She is alert and oriented to person, place, and time.   Physical Exam    Scheduled Meds   amiodarone, 200 mg, Oral, Q8H   Followed by  [START ON 2024] amiodarone, 200 mg, Oral, Q12H   Followed by  [START ON 2024] amiodarone, 200 mg, Oral, Daily  [Held by provider] apixaban, 5 mg, Oral, Q12H  [START ON 7/10/2024] bisacodyl, 10 mg, Oral, Once  furosemide, 40 mg, Intravenous, BID  metoprolol succinate XL, 25 mg, Oral, Q24H  [START ON 7/10/2024] pantoprazole, 40 mg, Intravenous, Q AM  pregabalin, 50 mg, Oral, Q8H  sodium-potassium-magnesium sulfates, 1 bottle, Oral, Q12H       PRN Meds     acetaminophen **OR** acetaminophen **OR** acetaminophen    senna-docusate sodium **AND** polyethylene glycol **AND** bisacodyl **AND** bisacodyl    Calcium Replacement - Follow Nurse / BPA Driven Protocol     HYDROcodone-acetaminophen    Magnesium Standard Dose Replacement - Follow Nurse / BPA Driven Protocol    melatonin    nitroglycerin    ondansetron ODT **OR** ondansetron    Phosphorus Replacement - Follow Nurse / BPA Driven Protocol    Potassium Replacement - Follow Nurse / BPA Driven Protocol    sodium chloride    [COMPLETED] Insert Peripheral IV **AND** sodium chloride   Infusions         Diagnostic Data    Results from last 7 days   Lab Units 07/09/24  0204 07/06/24  0358 07/05/24  0953   WBC 10*3/mm3 6.59   < > 6.89   HEMOGLOBIN g/dL 8.8*   < > 9.1*   HEMATOCRIT % 29.1*   < > 29.8*   PLATELETS 10*3/mm3 248   < > 254   GLUCOSE mg/dL 109*   < > 106*   CREATININE mg/dL 1.25*   < > 1.15*   BUN mg/dL 16   < > 26*   SODIUM mmol/L 135*   < > 137   POTASSIUM mmol/L 4.1   < > 4.4   AST (SGOT) U/L  --   --  24   ALT (SGPT) U/L  --   --  9   ALK PHOS U/L  --   --  90   BILIRUBIN mg/dL  --   --  1.2   ANION GAP mmol/L 12.2   < > 14.5    < > = values in this interval not displayed.       No radiology results for the last day      I reviewed the patient's new clinical results.    Assessment/Plan:     Active and Resolved Problems  Active Hospital Problems    Diagnosis  POA    **Atrial fibrillation with RVR [I48.91]  Yes    Iron deficiency anemia [D50.9]  Unknown    Atrial flutter with rapid ventricular response [I48.92]  Yes    Chronic HFrEF (heart failure with reduced ejection fraction) [I50.22]  Yes    Presence of cardiac pacemaker [Z95.0]  Yes    Essential (primary) hypertension [I10]  Yes      Resolved Hospital Problems   No resolved problems to display.       Atrial fibrillation with RVR  -Was initially on Cardizem, discontinued and started on IV amiodarone as per cardiology  - TTE on 7/6 at 24 showed LVEF 46 to 50% with severe dilation of right and left atrial cavity  -BP stable   -Chadsvasc 3+ was on Eliquis however paused by GI as she is scheduled to undergo EGD/colonoscopy tomorrow  -Cardiology consulted, restarted  beta-blocker and switched amnio IV to p.o.   -Scheduled for cardioversion tomorrow with cardiology  - TSH elevated, free T4 normal     H/O HFrEF  - BNP 10,939  - last echo 9/7/23 with EF 51-55%  -TTE shows EF of 46 to 50% with moderate to severe MR and TR  - CXR shows cardiomegaly with pulmonary vascular congestion, will administer a few doses of Lasix however will need to be cautious of blood pressure   -monitor I's and O's        Frequent Falls  - lives alone, no family for support  - frequent falls and admissions  - patient open to discussing assisted living facility   -PT/OT/CM consulted      Neuropathic pain/RLS  Lyrica ordered, with underlying iron deficiency anemia we will need to replete iron stores before making any changes to her medication to assess for true response  A1c 6.27  vitamin D Low, will start on supplementation     Iron deficiency anemia  - Iron panel reviewed, will start patient on IV iron supplementation    - scheduled for EGD/colonoscopy with GI tomorrow  - Transfuse if <7      Subclinical hypothyroidism  - TSH 5.75, free T4 WNL         VTE Prophylaxis:  Pharmacologic VTE prophylaxis orders are present.         Code status is   Code Status and Medical Interventions:   Ordered at: 07/05/24 1342     Level Of Support Discussed With:    Patient     Code Status (Patient has no pulse and is not breathing):    CPR (Attempt to Resuscitate)     Medical Interventions (Patient has pulse or is breathing):    Full Support         Time: 30 minutes    Signature: Electronically signed by Zuleima Canales MD, 07/09/24, 13:41 EDT.  [facility] Hospitalist Team

## 2024-07-09 NOTE — CONSULTS
GI CONSULT  NOTE:    Referring Provider:  Dr. Valero    Chief complaint: Anemia    Subjective .     History of present illness: Catalina Moreno is a 84 y.o. female with history of A-fib on Eliquis, CHF, CAD, SSS s/p pacemaker, cholecystectomy, and hysterectomy who presented on 7/5 with complaints of dyspnea and frequent falls.  She was found to be in A-fib with RVR and was started on Cardizem drip.  Cardiology following.  GI has been asked to consult due to anemia.  The patient denies any bright red blood per rectum or melena.  States that she has been having regular bowel movements.  Denies any abdominal pain.  Has had some recent nausea, but denies any vomiting.  No heartburn or dysphagia.  Admits to taking Advil as needed, but denies daily NSAID use.  No unintentional weight loss.      Endo History:  9/2023 EGD (Dr. Lamas) -1 cm semipedunculated inflammatory polyp in the gastric antrum, nonerosive gastritis  Patient reports more recent colonoscopy by Dr. Ortiz in [place].  9/2019 colonoscopy (Dr. Schaffer) -polyp (TA), 1.2 cm polyp not removed due to anticoagulation, grade 2 internal hemorrhoids  7/2012 EGD -bile gastritis    Past Medical History:  Past Medical History:   Diagnosis Date    Acquired spondylolisthesis of lumbosacral region     Acute kidney injury 07/22/2022    Anxiety     Arthritis     Atrial fibrillation     paroxysmal    Broken shoulder     left-- due to fall 11-7-19 was at [facility]    Chronic pain disorder     Chronic systolic CHF (congestive heart failure) 01/05/2023    EF 36-40%    Coronary artery disease involving native coronary artery of native heart without angina pectoris 04/02/2021    nonobstructive    DDD (degenerative disc disease), cervical     DDD (degenerative disc disease), lumbar     Depression     Edema     9/2020 foot    GERD (gastroesophageal reflux disease)     H/O fall     Heart failure with mid-range ejection fraction 03/05/2022    EF 45% per 2D echo    Hypertension      Hypothyroidism     Insomnia     Low back pain     Moderate mitral regurgitation 01/05/2023    Neuropathy     Nonrheumatic tricuspid valve regurgitation     Pain in both feet     Polypharmacy     PONV (postoperative nausea and vomiting)     Pulmonary hypertension     Radiculopathy     Restless legs     Sacroiliitis     Sick sinus syndrome     Added automatically from request for surgery [id number]    Spondylolisthesis     Stage 3a chronic kidney disease     Urinary incontinence     Vitamin D deficiency        Past Surgical History:  Past Surgical History:   Procedure Laterality Date    BACK SURGERY  07/19/2018    PDC &  PSF L3-L5 insitu    CARDIAC CATHETERIZATION N/A 4/2/2021    Procedure: Cardiac Catheterization/Vascular Study;  Surgeon: Barrett Evans MD;  Location: [facility] CATH INVASIVE LOCATION;  Service: Cardiovascular;  Laterality: N/A;    CARDIAC ELECTROPHYSIOLOGY PROCEDURE N/A 1/17/2023    Procedure: Pacemaker DC new;  Surgeon: Baljeet Valero MD;  Location: [facility] CATH INVASIVE LOCATION;  Service: Cardiovascular;  Laterality: N/A;    CHOLECYSTECTOMY      COLONOSCOPY      ENDOSCOPY N/A 9/14/2023    Procedure: ESOPHAGOGASTRODUODENOSCOPY;  Surgeon: Ulysses Lamas MD;  Location: [facility] ENDOSCOPY;  Service: Gastroenterology;  Laterality: N/A;  gastritis, inflammatory gastric polyp    HYSTERECTOMY      ROTATOR CUFF REPAIR Left        Social History:  Social History     Tobacco Use    Smoking status: Never     Passive exposure: Never    Smokeless tobacco: Never   Vaping Use    Vaping status: Never Used   Substance Use Topics    Alcohol use: No    Drug use: Never       Family History:  Family History   Problem Relation Age of Onset    Cancer Father     Diabetes Son     Cancer Paternal Aunt     Heart disease Paternal Aunt     Cancer Paternal Uncle        Medications:  Medications Prior to Admission   Medication Sig Dispense Refill Last Dose    melatonin 3 MG tablet Take 1 tablet by mouth Every  Night.   Past Week    metoprolol succinate XL (TOPROL-XL) 25 MG 24 hr tablet Take 1 tablet by mouth Daily.   7/4/2024    cyclobenzaprine (FLEXERIL) 5 MG tablet Take 1 tablet by mouth 2 (Two) Times a Day As Needed for Muscle Spasms.          Scheduled Meds:amiodarone, 200 mg, Oral, Q8H   Followed by  [START ON 7/23/2024] amiodarone, 200 mg, Oral, Q12H   Followed by  [START ON 8/6/2024] amiodarone, 200 mg, Oral, Daily  apixaban, 5 mg, Oral, Q12H  furosemide, 40 mg, Intravenous, BID  metoprolol succinate XL, 25 mg, Oral, Q24H  pregabalin, 50 mg, Oral, Q8H      Continuous Infusions:   PRN Meds:.  acetaminophen **OR** acetaminophen **OR** acetaminophen    senna-docusate sodium **AND** polyethylene glycol **AND** bisacodyl **AND** bisacodyl    Calcium Replacement - Follow Nurse / BPA Driven Protocol    HYDROcodone-acetaminophen    Magnesium Standard Dose Replacement - Follow Nurse / BPA Driven Protocol    melatonin    nitroglycerin    ondansetron ODT **OR** ondansetron    Phosphorus Replacement - Follow Nurse / BPA Driven Protocol    Potassium Replacement - Follow Nurse / BPA Driven Protocol    sodium chloride    [COMPLETED] Insert Peripheral IV **AND** sodium chloride    ALLERGIES:  Baclofen, Codeine, Ibuprofen, Methocarbamol, Naproxen, Tizanidine hcl, and Tolmetin    ROS:  The following systems were reviewed and negative;   Constitution:  No fevers, chills, no unintentional weight loss  Skin: no rash, no jaundice  Eyes:  No blurry vision, no eye pain  HENT:  No change in hearing or smell  Resp:  No dyspnea or cough  CV:  No chest pain or palpitations  :  No dysuria, hematuria  Musculoskeletal:  No leg cramps or arthralgias  Neuro:  No tremor, no numbness  Psych:  No depression or confusion    Objective     Vital Signs:   Vitals:    07/09/24 0030 07/09/24 0133 07/09/24 0350 07/09/24 0755   BP: 95/45 116/54 110/75 124/71   BP Location: Right arm  Right arm Right arm   Patient Position: Lying  Lying Lying   Pulse: 82 104  112 105   Resp: 16  16 17   Temp: 98.3 °F (36.8 °C)  98.3 °F (36.8 °C) 97.8 °F (36.6 °C)   TempSrc: Oral  Oral Oral   SpO2: 96% 98% 99% 94%   Weight:       Height:           Physical Exam:       General Appearance:    Awake and alert, in no acute distress   Head:    Normocephalic, without obvious abnormality, atraumatic   Throat:   No oral lesions, no thrush, oral mucosa moist   Lungs:     Respirations regular, even and unlabored   Chest Wall:    No abnormalities observed   Abdomen:     Soft, non-tender, no rebound or guarding, non-distended   Rectal:     Deferred   Extremities:   Moves all extremities, no edema, no cyanosis   Pulses:   Pulses palpable and equal bilaterally   Skin:   No rash, no jaundice, normal palpation   Lymph nodes:   No cervical, supraclavicular or submandibular palpable adenopathy   Neurologic:   Cranial nerves 2 - 12 grossly intact, no asterixis       Results Review:   I reviewed the patient's labs and imaging.  CBC    Results from last 7 days   Lab Units 07/09/24  0204 07/08/24  0309 07/07/24  0457 07/06/24  0358 07/05/24  0953   WBC 10*3/mm3 6.59 6.86 7.79 6.59 6.89   HEMOGLOBIN g/dL 8.8* 9.4* 9.6* 8.5* 9.1*   PLATELETS 10*3/mm3 248 260 251 231 254     CMP   Results from last 7 days   Lab Units 07/09/24  0204 07/08/24  1026 07/08/24  0309 07/07/24  1544 07/07/24  0457 07/06/24  0358 07/05/24  0953   SODIUM mmol/L 135*  --  139  --  141 138 137   POTASSIUM mmol/L 4.1 3.8 3.1*  --  3.8 4.0 4.4   CHLORIDE mmol/L 97*  --  100  --  105 106 103   CO2 mmol/L 25.8  --  27.9  --  26.2 20.7* 19.5*   BUN mg/dL 16  --  14  --  20 23 26*   CREATININE mg/dL 1.25*  --  1.05*  --  1.09* 1.07* 1.15*   GLUCOSE mg/dL 109*  --  105*  --  125* 90 106*   ALBUMIN g/dL  --   --   --   --   --   --  4.5   BILIRUBIN mg/dL  --   --   --   --   --   --  1.2   ALK PHOS U/L  --   --   --   --   --   --  90   AST (SGOT) U/L  --   --   --   --   --   --  24   ALT (SGPT) U/L  --   --   --   --   --   --  9   MAGNESIUM  "mg/dL 1.9  --  1.8 1.7  --   --  2.0   PHOSPHORUS mg/dL 2.9  --  3.6  --   --   --   --      Cr Clearance Estimated Creatinine Clearance: 33.1 mL/min (A) (by C-G formula based on SCr of 1.25 mg/dL (H)).  Coag     HbA1C   Lab Results   Component Value Date    HGBA1C 6.27 (H) 07/08/2024    HGBA1C 5.80 (H) 09/18/2023    HGBA1C 5.9 (H) 01/05/2023     Blood Glucose No results found for: \"POCGLU\"  Infection     UA      Imaging Results (Last 72 Hours)       ** No results found for the last 72 hours. **            ASSESSMENT:  -Iron deficiency anemia  -Nausea  -A-fib with RVR -on Eliquis  -ARIELA  -CHF  -CAD  -SSS s/p pacemaker  -History of cholecystectomy  -History of hysterectomy    PLAN:  Patient is an 84-year-old female with history of A-fib on Eliquis, CHF, CAD, and SSS s/p pacemaker who presented on 7/5 with complaints of dyspnea.  Found to be in A-fib with RVR.  Cardiology following.  GI has been asked to consult due to anemia.    Hemoglobin 8.8 from 9.4.  Continue to monitor H/H and transfuse as needed.  Review of records shows that hemoglobin was 11 in 3/2024.  Serum iron 24, iron saturation 5%, ferritin 37.  Agree with IV iron infusion.  We will plan EGD/colonoscopy tomorrow for further evaluation.  Clear liquid diet.  N.p.o. following completion of bowel prep.  Hold Eliquis.  Start PPI.  Antiemetics as needed.  Further recommendations to follow endoscopy.  Supportive care.       I discussed the patients findings and my recommendations with the patient.  I will discuss case with Dr. Vanegas and change plan accordingly.    We appreciate the referral.    Electronically signed by KALYANI Dowell, 07/09/24, 11:10 AM EDT.                "

## 2024-07-10 ENCOUNTER — ANESTHESIA (OUTPATIENT)
Dept: GASTROENTEROLOGY | Facility: HOSPITAL | Age: 84
End: 2024-07-10
Payer: MEDICARE

## 2024-07-10 ENCOUNTER — ANESTHESIA EVENT (OUTPATIENT)
Dept: GASTROENTEROLOGY | Facility: HOSPITAL | Age: 84
End: 2024-07-10
Payer: MEDICARE

## 2024-07-10 ENCOUNTER — INPATIENT HOSPITAL (AMBULATORY)
Dept: URBAN - METROPOLITAN AREA HOSPITAL 84 | Facility: HOSPITAL | Age: 84
End: 2024-07-10
Payer: COMMERCIAL

## 2024-07-10 DIAGNOSIS — K44.9 DIAPHRAGMATIC HERNIA WITHOUT OBSTRUCTION OR GANGRENE: ICD-10-CM

## 2024-07-10 DIAGNOSIS — D50.9 IRON DEFICIENCY ANEMIA, UNSPECIFIED: ICD-10-CM

## 2024-07-10 DIAGNOSIS — D12.5 BENIGN NEOPLASM OF SIGMOID COLON: ICD-10-CM

## 2024-07-10 DIAGNOSIS — K57.30 DIVERTICULOSIS OF LARGE INTESTINE WITHOUT PERFORATION OR ABS: ICD-10-CM

## 2024-07-10 DIAGNOSIS — K31.7 POLYP OF STOMACH AND DUODENUM: ICD-10-CM

## 2024-07-10 LAB
ALBUMIN SERPL-MCNC: 4.1 G/DL (ref 3.5–5.2)
ALBUMIN/GLOB SERPL: 1.5 G/DL
ALP SERPL-CCNC: 82 U/L (ref 39–117)
ALT SERPL W P-5'-P-CCNC: 6 U/L (ref 1–33)
ANION GAP SERPL CALCULATED.3IONS-SCNC: 11.3 MMOL/L (ref 5–15)
AST SERPL-CCNC: 32 U/L (ref 1–32)
BASOPHILS # BLD AUTO: 0.05 10*3/MM3 (ref 0–0.2)
BASOPHILS NFR BLD AUTO: 0.7 % (ref 0–1.5)
BILIRUB SERPL-MCNC: 0.6 MG/DL (ref 0–1.2)
BUN SERPL-MCNC: 12 MG/DL (ref 8–23)
BUN/CREAT SERPL: 12.9 (ref 7–25)
CALCIUM SPEC-SCNC: 8.8 MG/DL (ref 8.6–10.5)
CHLORIDE SERPL-SCNC: 96 MMOL/L (ref 98–107)
CO2 SERPL-SCNC: 27.7 MMOL/L (ref 22–29)
CREAT SERPL-MCNC: 0.93 MG/DL (ref 0.57–1)
DEPRECATED RDW RBC AUTO: 51.5 FL (ref 37–54)
EGFRCR SERPLBLD CKD-EPI 2021: 61.1 ML/MIN/1.73
EOSINOPHIL # BLD AUTO: 0.15 10*3/MM3 (ref 0–0.4)
EOSINOPHIL NFR BLD AUTO: 2.1 % (ref 0.3–6.2)
ERYTHROCYTE [DISTWIDTH] IN BLOOD BY AUTOMATED COUNT: 19.3 % (ref 12.3–15.4)
GLOBULIN UR ELPH-MCNC: 2.8 GM/DL
GLUCOSE SERPL-MCNC: 93 MG/DL (ref 65–99)
HCT VFR BLD AUTO: 31.5 % (ref 34–46.6)
HGB BLD-MCNC: 9.5 G/DL (ref 12–15.9)
IMM GRANULOCYTES # BLD AUTO: 0.03 10*3/MM3 (ref 0–0.05)
IMM GRANULOCYTES NFR BLD AUTO: 0.4 % (ref 0–0.5)
INR PPP: 1.07 (ref 0.93–1.1)
LYMPHOCYTES # BLD AUTO: 1.74 10*3/MM3 (ref 0.7–3.1)
LYMPHOCYTES NFR BLD AUTO: 23.8 % (ref 19.6–45.3)
MAGNESIUM SERPL-MCNC: 1.9 MG/DL (ref 1.6–2.4)
MCH RBC QN AUTO: 24 PG (ref 26.6–33)
MCHC RBC AUTO-ENTMCNC: 30.2 G/DL (ref 31.5–35.7)
MCV RBC AUTO: 79.5 FL (ref 79–97)
MONOCYTES # BLD AUTO: 0.58 10*3/MM3 (ref 0.1–0.9)
MONOCYTES NFR BLD AUTO: 7.9 % (ref 5–12)
NEUTROPHILS NFR BLD AUTO: 4.76 10*3/MM3 (ref 1.7–7)
NEUTROPHILS NFR BLD AUTO: 65.1 % (ref 42.7–76)
NRBC BLD AUTO-RTO: 0 /100 WBC (ref 0–0.2)
PHOSPHATE SERPL-MCNC: 2.7 MG/DL (ref 2.5–4.5)
PLATELET # BLD AUTO: 271 10*3/MM3 (ref 140–450)
PMV BLD AUTO: 11.2 FL (ref 6–12)
POTASSIUM SERPL-SCNC: 4.3 MMOL/L (ref 3.5–5.2)
PROT SERPL-MCNC: 6.9 G/DL (ref 6–8.5)
PROTHROMBIN TIME: 11.6 SECONDS (ref 9.6–11.7)
QT INTERVAL: 401 MS
QTC INTERVAL: 504 MS
RBC # BLD AUTO: 3.96 10*6/MM3 (ref 3.77–5.28)
SODIUM SERPL-SCNC: 135 MMOL/L (ref 136–145)
WBC NRBC COR # BLD AUTO: 7.31 10*3/MM3 (ref 3.4–10.8)

## 2024-07-10 PROCEDURE — 84100 ASSAY OF PHOSPHORUS: CPT

## 2024-07-10 PROCEDURE — 85610 PROTHROMBIN TIME: CPT | Performed by: NURSE PRACTITIONER

## 2024-07-10 PROCEDURE — 0DBN8ZX EXCISION OF SIGMOID COLON, VIA NATURAL OR ARTIFICIAL OPENING ENDOSCOPIC, DIAGNOSTIC: ICD-10-PCS | Performed by: INTERNAL MEDICINE

## 2024-07-10 PROCEDURE — 93005 ELECTROCARDIOGRAM TRACING: CPT

## 2024-07-10 PROCEDURE — 80053 COMPREHEN METABOLIC PANEL: CPT | Performed by: NURSE PRACTITIONER

## 2024-07-10 PROCEDURE — 99232 SBSQ HOSP IP/OBS MODERATE 35: CPT | Performed by: INTERNAL MEDICINE

## 2024-07-10 PROCEDURE — 45385 COLONOSCOPY W/LESION REMOVAL: CPT | Performed by: INTERNAL MEDICINE

## 2024-07-10 PROCEDURE — 43235 EGD DIAGNOSTIC BRUSH WASH: CPT | Performed by: INTERNAL MEDICINE

## 2024-07-10 PROCEDURE — 88305 TISSUE EXAM BY PATHOLOGIST: CPT | Performed by: INTERNAL MEDICINE

## 2024-07-10 PROCEDURE — 85025 COMPLETE CBC W/AUTO DIFF WBC: CPT

## 2024-07-10 PROCEDURE — 25010000002 PROPOFOL 200 MG/20ML EMULSION

## 2024-07-10 PROCEDURE — 25810000003 SODIUM CHLORIDE 0.9 % SOLUTION

## 2024-07-10 PROCEDURE — 83735 ASSAY OF MAGNESIUM: CPT

## 2024-07-10 PROCEDURE — 0DJ08ZZ INSPECTION OF UPPER INTESTINAL TRACT, VIA NATURAL OR ARTIFICIAL OPENING ENDOSCOPIC: ICD-10-PCS | Performed by: INTERNAL MEDICINE

## 2024-07-10 PROCEDURE — 25010000002 PHENYLEPHRINE 10 MG/ML SOLUTION

## 2024-07-10 DEVICE — DEV CLIP HEMO RESOLUTION360/ULTR 235CM 17MM STRL: Type: IMPLANTABLE DEVICE | Site: SIGMOID COLON | Status: FUNCTIONAL

## 2024-07-10 RX ORDER — ROPINIROLE 0.25 MG/1
0.25 TABLET, FILM COATED ORAL ONCE
Status: COMPLETED | OUTPATIENT
Start: 2024-07-10 | End: 2024-07-10

## 2024-07-10 RX ORDER — LABETALOL HYDROCHLORIDE 5 MG/ML
5 INJECTION, SOLUTION INTRAVENOUS
Status: DISCONTINUED | OUTPATIENT
Start: 2024-07-10 | End: 2024-07-10 | Stop reason: HOSPADM

## 2024-07-10 RX ORDER — EPHEDRINE SULFATE 5 MG/ML
5 INJECTION INTRAVENOUS ONCE AS NEEDED
Status: DISCONTINUED | OUTPATIENT
Start: 2024-07-10 | End: 2024-07-10 | Stop reason: HOSPADM

## 2024-07-10 RX ORDER — DIPHENHYDRAMINE HYDROCHLORIDE 50 MG/ML
12.5 INJECTION INTRAMUSCULAR; INTRAVENOUS
Status: DISCONTINUED | OUTPATIENT
Start: 2024-07-10 | End: 2024-07-10 | Stop reason: HOSPADM

## 2024-07-10 RX ORDER — SODIUM CHLORIDE 9 MG/ML
INJECTION, SOLUTION INTRAVENOUS CONTINUOUS PRN
Status: DISCONTINUED | OUTPATIENT
Start: 2024-07-10 | End: 2024-07-10 | Stop reason: SURG

## 2024-07-10 RX ORDER — LIDOCAINE HYDROCHLORIDE 20 MG/ML
INJECTION, SOLUTION EPIDURAL; INFILTRATION; INTRACAUDAL; PERINEURAL AS NEEDED
Status: DISCONTINUED | OUTPATIENT
Start: 2024-07-10 | End: 2024-07-10 | Stop reason: SURG

## 2024-07-10 RX ORDER — HYDRALAZINE HYDROCHLORIDE 20 MG/ML
5 INJECTION INTRAMUSCULAR; INTRAVENOUS
Status: DISCONTINUED | OUTPATIENT
Start: 2024-07-10 | End: 2024-07-10 | Stop reason: HOSPADM

## 2024-07-10 RX ORDER — PHENYLEPHRINE HYDROCHLORIDE 10 MG/ML
INJECTION INTRAVENOUS AS NEEDED
Status: DISCONTINUED | OUTPATIENT
Start: 2024-07-10 | End: 2024-07-10 | Stop reason: SURG

## 2024-07-10 RX ORDER — IPRATROPIUM BROMIDE AND ALBUTEROL SULFATE 2.5; .5 MG/3ML; MG/3ML
3 SOLUTION RESPIRATORY (INHALATION) ONCE AS NEEDED
Status: DISCONTINUED | OUTPATIENT
Start: 2024-07-10 | End: 2024-07-10 | Stop reason: HOSPADM

## 2024-07-10 RX ORDER — PROPOFOL 10 MG/ML
INJECTION, EMULSION INTRAVENOUS CONTINUOUS PRN
Status: DISCONTINUED | OUTPATIENT
Start: 2024-07-10 | End: 2024-07-10 | Stop reason: SURG

## 2024-07-10 RX ORDER — ONDANSETRON 2 MG/ML
4 INJECTION INTRAMUSCULAR; INTRAVENOUS ONCE AS NEEDED
Status: DISCONTINUED | OUTPATIENT
Start: 2024-07-10 | End: 2024-07-10 | Stop reason: HOSPADM

## 2024-07-10 RX ADMIN — PHENYLEPHRINE HYDROCHLORIDE 200 MCG: 10 INJECTION INTRAVENOUS at 13:03

## 2024-07-10 RX ADMIN — LIDOCAINE HYDROCHLORIDE 60 MG: 20 INJECTION, SOLUTION EPIDURAL; INFILTRATION; INTRACAUDAL; PERINEURAL at 12:55

## 2024-07-10 RX ADMIN — AMIODARONE HYDROCHLORIDE 200 MG: 200 TABLET ORAL at 07:12

## 2024-07-10 RX ADMIN — LIDOCAINE HYDROCHLORIDE 70 MG: 20 INJECTION, SOLUTION EPIDURAL; INFILTRATION; INTRACAUDAL; PERINEURAL at 13:05

## 2024-07-10 RX ADMIN — SODIUM CHLORIDE: 9 INJECTION, SOLUTION INTRAVENOUS at 13:00

## 2024-07-10 RX ADMIN — PANTOPRAZOLE SODIUM 40 MG: 40 INJECTION, POWDER, FOR SOLUTION INTRAVENOUS at 05:11

## 2024-07-10 RX ADMIN — PROPOFOL 50 MCG/KG/MIN: 10 INJECTION, EMULSION INTRAVENOUS at 12:55

## 2024-07-10 RX ADMIN — METOPROLOL SUCCINATE 25 MG: 25 TABLET, EXTENDED RELEASE ORAL at 07:11

## 2024-07-10 RX ADMIN — HYDROCODONE BITARTRATE AND ACETAMINOPHEN 1 TABLET: 5; 325 TABLET ORAL at 05:11

## 2024-07-10 RX ADMIN — ROPINIROLE HYDROCHLORIDE 0.25 MG: 0.25 TABLET, FILM COATED ORAL at 01:38

## 2024-07-10 RX ADMIN — PREGABALIN 50 MG: 25 CAPSULE ORAL at 14:32

## 2024-07-10 RX ADMIN — Medication 5000 UNITS: at 07:11

## 2024-07-10 RX ADMIN — PHENYLEPHRINE HYDROCHLORIDE 200 MCG: 10 INJECTION INTRAVENOUS at 13:09

## 2024-07-10 RX ADMIN — AMIODARONE HYDROCHLORIDE 200 MG: 200 TABLET ORAL at 17:30

## 2024-07-10 RX ADMIN — PREGABALIN 50 MG: 25 CAPSULE ORAL at 05:11

## 2024-07-10 RX ADMIN — AMIODARONE HYDROCHLORIDE 200 MG: 200 TABLET ORAL at 00:26

## 2024-07-10 RX ADMIN — PHENYLEPHRINE HYDROCHLORIDE 200 MCG: 10 INJECTION INTRAVENOUS at 13:06

## 2024-07-10 RX ADMIN — ACETAMINOPHEN 650 MG: 325 TABLET, FILM COATED ORAL at 22:28

## 2024-07-10 RX ADMIN — ACETAMINOPHEN 650 MG: 325 TABLET, FILM COATED ORAL at 00:34

## 2024-07-10 RX ADMIN — SODIUM SULFATE, POTASSIUM SULFATE, MAGNESIUM SULFATE 1 BOTTLE: 17.5; 3.13; 1.6 SOLUTION, CONCENTRATE ORAL at 02:53

## 2024-07-10 RX ADMIN — PREGABALIN 50 MG: 25 CAPSULE ORAL at 22:28

## 2024-07-10 RX ADMIN — Medication 5 MG: at 00:34

## 2024-07-10 RX ADMIN — BISACODYL 10 MG: 5 TABLET, COATED ORAL at 00:26

## 2024-07-10 RX ADMIN — PROPOFOL 50 MG: 10 INJECTION, EMULSION INTRAVENOUS at 12:54

## 2024-07-10 NOTE — PLAN OF CARE
"  Problem: Adult Inpatient Plan of Care  Goal: Absence of Hospital-Acquired Illness or Injury  Intervention: Identify and Manage Fall Risk  Recent Flowsheet Documentation  Taken 7/10/2024 0247 by Neela Hurtado RN  Safety Promotion/Fall Prevention:   assistive device/personal items within reach   clutter free environment maintained   fall prevention program maintained   lighting adjusted   nonskid shoes/slippers when out of bed   room organization consistent   safety round/check completed  Intervention: Prevent Skin Injury  Recent Flowsheet Documentation  Taken 7/10/2024 0247 by Neela Hurtado RN  Body Position: position changed independently  Intervention: Prevent and Manage VTE (Venous Thromboembolism) Risk  Recent Flowsheet Documentation  Taken 7/10/2024 0247 by Neela Hurtado RN  Activity Management: activity encouraged  Intervention: Prevent Infection  Recent Flowsheet Documentation  Taken 7/10/2024 0247 by Neela Hurtado RN  Infection Prevention:   personal protective equipment utilized   hand hygiene promoted   Goal Outcome Evaluation:      Pt given 2nd dose of sureprep. C/o jonah LE pain. PRN given, \"slight relief\" noted per patient. Pain currently 7/10. PRN meds given per MAR. Pt resting in bed at this time. Call light in reach. Plan of care to continue.                                         "

## 2024-07-10 NOTE — NURSING NOTE
Pt with c/o 10/10 pain Hank LE. Hospitalist notified. One time dose requip given. Pt with continued c/o LE pain. Heat packs and ambulating in room attempted for pain relief. All efforts ineffective at this time. Too early for PRN meds at this time.

## 2024-07-10 NOTE — PLAN OF CARE
Goal Outcome Evaluation:           Progress: improving  Outcome Evaluation: Patient had an EGD today. Has a hiatal hernia and 2 gastric polyps. No arterial venous malformation. She also had a colonoscopy and 1 polyp was removed with 2 clips. Patient has diverticulosis and a non-bleeding internal hemorrhoid. No arterial venous malformation and no bleeding found. Robin was supposed to get cardioverted today but it was around 1500 when she came back on the unit. Cardioversion anticipated for tomorrow.

## 2024-07-10 NOTE — CASE MANAGEMENT/SOCIAL WORK
Continued Stay Note  KADEEM Rios     Patient Name: Catalina Moreno  MRN: [id number]  Today's Date: 7/10/2024    Admit Date: 7/5/2024    Plan: Return home with [facility] (accepted, will need order)   Discharge Plan       Row Name 07/10/24 1709       Plan    Plan Return home with [facility] (accepted, will need order)    Plan Comments BArriers to discharge: Egd/ coloncospy and Cardioversion today.             Expected Discharge Date and Time       Expected Discharge Date Expected Discharge Time    Jul 11, 2024           Yanique Seo RN    Office Phone [phone]  Office Cell [phone]

## 2024-07-10 NOTE — ANESTHESIA POSTPROCEDURE EVALUATION
Patient: Catalina Moreno    Procedure Summary       Date: 07/10/24 Room / Location: [facility] ENDOSCOPY 1 / [facility] ENDOSCOPY    Anesthesia Start: 1242 Anesthesia Stop: 1328    Procedures:       COLONOSCOPY with cold snare polypectomy x1 and endoscopic clipping x2      ESOPHAGOGASTRODUODENOSCOPY Diagnosis:       Iron deficiency anemia, unspecified iron deficiency anemia type      (Iron deficiency anemia, unspecified iron deficiency anemia type [D50.9])    Surgeons: MARLIN Vanegas MD Provider: Ant Morales MD    Anesthesia Type: MAC ASA Status: 4            Anesthesia Type: MAC    Vitals  Vitals Value Taken Time   /70 07/10/24 1351   Temp     Pulse 79 07/10/24 1352   Resp 13 07/10/24 1339   SpO2 95 % 07/10/24 1352   Vitals shown include unfiled device data.        Post Anesthesia Care and Evaluation    Patient location during evaluation: PACU  Patient participation: complete - patient participated  Level of consciousness: awake  Pain scale: See nurse's notes for pain score.  Pain management: adequate    Airway patency: patent  Anesthetic complications: No anesthetic complications  PONV Status: none  Cardiovascular status: acceptable  Respiratory status: acceptable and spontaneous ventilation  Hydration status: acceptable    Comments: Patient seen and examined postoperatively; vital signs stable; SpO2 greater than or equal to 90%; cardiopulmonary status stable; nausea/vomiting adequately controlled; pain adequately controlled; no apparent anesthesia complications; patient discharged from anesthesia care when discharge criteria were met

## 2024-07-10 NOTE — OP NOTE
COLONOSCOPY, ESOPHAGOGASTRODUODENOSCOPY Procedure Report    Patient Name:  Catalina Moreno  YOB: 1940    Date of Surgery:  7/10/2024     Pre-Op Diagnosis:  Iron deficiency anemia, unspecified iron deficiency anemia type [D50.9]       Post-Op Diagnosis Codes:     * Iron deficiency anemia, unspecified iron deficiency anemia type [D50.9]    Postop diagnosis:  1.  Hiatal hernia  2.  Gastritis  3.  Fundic gland polyp  4.  Sigmoid colon polyp  5.  Diverticulosis  6.  Internal hemorrhoids    Procedure/CPT® Codes:      Procedure(s):  COLONOSCOPY with cold snare polypectomy x1 and endoscopic clipping x2  ESOPHAGOGASTRODUODENOSCOPY    Staff:  Surgeon(s):  MARLIN Vanegas MD      Anesthesia: Monitored Anesthesia Care    Description of Procedure:  A description of the procedure as well as risks, benefits and alternative methods were explained to the patient who voiced understanding and signed the corresponding consent form. A physical exam was performed and vital signs were monitored throughout the procedure.    An upper GI endoscope was placed into the mouth and proceeded through the esophagus, stomach and second portion of the duodenum without difficulty. The scope was then retroflexed and the fundus was visualized. The procedure was not difficult and there were no immediate complications.  There was no blood loss.    Next, A rectal exam was performed which was normal. An Olympus colonoscope was placed into the rectum and proceeded under direct visualization through the colon until the cecum and appendiceal orifice were identified. Careful visualization occurred upon slow withdraw of the scope. The scope was then retroflexed and the distal rectum was visualized. The quality of the prep was good. The procedure was not difficult and there were no immediate complications.  There was no blood loss.    EGD findings:  1.  Normal mucosa of the whole esophagus  2.  Medium size sliding hiatal hernia  3.  2 small  benign sessile polyps in the stomach antrum and body consistent with fundic gland polyps  4.  Otherwise normal-appearing mucosa of the whole stomach  5.  Normal mucosa in the duodenal bulb and second and third portion of the duodenum.  No AVMs were seen in the stomach or duodenum    Colonoscopy findings:  1.  1 polyp in the sigmoid colon 6 mm in diameter which appeared benign, sessile removed in a single piece with cold snare polypectomy completely removed.  Bleeding after polypectomy treated with 2 endoclips successfully with hemostasis  2.  Moderate diverticulosis in the descending sigmoid colon with several small size openings with no bleeding  3.  Medium size nonbleeding internal hemorrhoids  4.  Normal mucosa in the terminal ileum.  No AVMs were seen  5.  Otherwise normal mucosa of the whole colon no AVMs were seen.    Recommendations:  -Okay to resume regular diet  -Trend hemoglobin  -High risk for further endoscopy with her cardiac history      ALEKSANDRA Vanegas MD     Date: 7/10/2024    Time: 13:15 EDT

## 2024-07-10 NOTE — ANESTHESIA PREPROCEDURE EVALUATION
Anesthesia Evaluation     history of anesthetic complications:  PONV  NPO Solid Status: > 8 hours             Airway   Mallampati: I  TM distance: >3 FB  Neck ROM: full  No difficulty expected  Dental - normal exam     Pulmonary - normal exam   Cardiovascular - normal exam    (+) pacemaker, hypertension, valvular problems/murmurs, CAD, dysrhythmias, CHF     ROS comment:   ·  Left ventricular ejection fraction appears to be 46 - 50%.  ·  Left ventricular wall thickness is consistent with moderate concentric hypertrophy.  ·  The left atrial cavity is severely dilated.  ·  The right atrial cavity is moderate to severely  dilated.  ·  Abnormal mitral valve structure consistent with dilated annulus.  ·  Moderate to severe mitral valve regurgitation is present with a centrally-directed jet noted.  ·  Moderate to severe tricuspid valve regurgitation is present.  ·  Estimated right ventricular systolic pressure from tricuspid regurgitation is moderately elevated (45-55 mmHg).     Transthoracic echocardiography reveals moderate LVH with overall EF between 45 to 50%  Moderate to severe central mitral regurgitation and moderate to severe TR with moderate pulm hypertension with mild to moderate AI.  Severe left atrial enlargement and moderate right atrial enlargement and no effusion.      Neuro/Psych  (+) weakness, numbness, psychiatric history  GI/Hepatic/Renal/Endo    (+) GERD, renal disease-, thyroid problem hypothyroidism    Musculoskeletal     (+) myalgias  Abdominal  - normal exam    Bowel sounds: normal.   Substance History      OB/GYN          Other   arthritis,                   Anesthesia Plan    ASA 4     MAC   total IV anesthesia  intravenous induction     Anesthetic plan, risks, benefits, and alternatives have been provided, discussed and informed consent has been obtained with: patient.  Pre-procedure education provided  Plan discussed with CRNA.    CODE STATUS:    Level Of Support Discussed With: Patient  Code  Status (Patient has no pulse and is not breathing): CPR (Attempt to Resuscitate)  Medical Interventions (Patient has pulse or is breathing): Full Support

## 2024-07-10 NOTE — SIGNIFICANT NOTE
07/10/24 1440   OTHER   Discipline physical therapist   Rehab Time/Intention   Session Not Performed patient unavailable for treatment  (Pt off the floor for colonoscopy/EGD. Will check back at later date/time.)   Recommendation   PT - Next Appointment 07/11/24

## 2024-07-10 NOTE — PROGRESS NOTES
CARDIOLOGY PROGRESS NOTE:    Catalina Moreno  84 y.o.  female  1940  [id number]      Referring Provider: Christal Tubbs APRN    Reason for follow-up: afib with RVR      Patient Care Team:  Flash Gonzalez MD as PCP - General (Cardiology)  Flash Gonzalez MD as Consulting Physician (Cardiology)    Subjective no chest pain or shortness of breath  Objective  Patient lying in bed resting on 1 L NC      Review of Systems   Constitutional: Negative for chills, fever and malaise/fatigue.   Cardiovascular:  Negative for chest pain, dyspnea on exertion, leg swelling, palpitations and syncope.   Respiratory:  Negative for cough and shortness of breath.    Gastrointestinal:  Negative for abdominal pain, nausea and vomiting.   Neurological:  Positive for weakness. Negative for dizziness, focal weakness, headaches, light-headedness and numbness.   All other systems reviewed and are negative.      Allergies: Baclofen, Codeine, Ibuprofen, Methocarbamol, Naproxen, Tizanidine hcl, and Tolmetin    Scheduled Meds:amiodarone, 200 mg, Oral, Q8H   Followed by  [START ON 7/23/2024] amiodarone, 200 mg, Oral, Q12H   Followed by  [START ON 8/6/2024] amiodarone, 200 mg, Oral, Daily  [Held by provider] apixaban, 5 mg, Oral, Q12H  metoprolol succinate XL, 25 mg, Oral, Q24H  pantoprazole, 40 mg, Intravenous, Q AM  pregabalin, 50 mg, Oral, Q8H  vitamin D3, 5,000 Units, Oral, Daily      Continuous Infusions:lactated ringers, 50 mL/hr, Last Rate: 50 mL/hr (07/09/24 1439)        PRN Meds:.  acetaminophen **OR** acetaminophen **OR** acetaminophen    senna-docusate sodium **AND** polyethylene glycol **AND** bisacodyl **AND** bisacodyl    Calcium Replacement - Follow Nurse / BPA Driven Protocol    HYDROcodone-acetaminophen    Magnesium Standard Dose Replacement - Follow Nurse / BPA Driven Protocol    melatonin    nitroglycerin    ondansetron ODT **OR** ondansetron    Phosphorus Replacement - Follow Nurse / BPA Driven Protocol    Potassium Replacement  "- Follow Nurse / BPA Driven Protocol    sodium chloride    [COMPLETED] Insert Peripheral IV **AND** sodium chloride        VITAL SIGNS  Vitals:    07/09/24 2223 07/09/24 2311 07/10/24 0026 07/10/24 0429   BP:  126/64 136/80 162/86   BP Location:  Right arm Right arm Right arm   Patient Position:  Lying Lying Lying   Pulse:  94 98 82   Resp:  16 16 18   Temp:  97.7 °F (36.5 °C)  97.4 °F (36.3 °C)   TempSrc:  Oral  Oral   SpO2: 97% 97% 94% 97%   Weight:       Height:           Flowsheet Rows      Flowsheet Row First Filed Value   Admission Height 160 cm (63\") Documented at 07/05/2024 0950   Admission Weight 74.8 kg (165 lb) Documented at 07/05/2024 0950             TELEMETRY: atrial fibrillation with controlled ventricular response     Physical Exam:  Vitals reviewed.   Constitutional:       Appearance: Overweight and not in distress.   Eyes:      Pupils: Pupils are equal, round, and reactive to light.   HENT:    Mouth/Throat:      Pharynx: Oropharynx is clear.   Pulmonary:      Effort: Pulmonary effort is normal.      Breath sounds: Normal breath sounds.   Cardiovascular:      Normal rate. Irregularly irregular rhythm.   Pulses:     Intact distal pulses.   Abdominal:      Palpations: Abdomen is soft.   Musculoskeletal: Normal range of motion.      Cervical back: Normal range of motion and neck supple. Skin:     General: Skin is warm and dry.   Neurological:      General: No focal deficit present.      Mental Status: Alert.          Results Review:   I reviewed the patient's new clinical results.  Lab Results (last 24 hours)       Procedure Component Value Units Date/Time    Magnesium [[id number]]  (Normal) Collected: 07/10/24 0309    Specimen: Blood from Arm, Right Updated: 07/10/24 0437     Magnesium 1.9 mg/dL     Comprehensive Metabolic Panel [[id number]]  (Abnormal) Collected: 07/10/24 0309    Specimen: Blood from Arm, Right Updated: 07/10/24 0437     Glucose 93 mg/dL      BUN 12 mg/dL      Creatinine 0.93 mg/dL     "  Sodium 135 mmol/L      Potassium 4.3 mmol/L      Comment: Slight hemolysis detected by analyzer. Result may be falsely elevated.        Chloride 96 mmol/L      CO2 27.7 mmol/L      Calcium 8.8 mg/dL      Total Protein 6.9 g/dL      Albumin 4.1 g/dL      ALT (SGPT) 6 U/L      AST (SGOT) 32 U/L      Comment: Slight hemolysis detected by analyzer. Result may be falsely elevated.        Alkaline Phosphatase 82 U/L      Total Bilirubin 0.6 mg/dL      Globulin 2.8 gm/dL      A/G Ratio 1.5 g/dL      BUN/Creatinine Ratio 12.9     Anion Gap 11.3 mmol/L      eGFR 61.1 mL/min/1.73     Narrative:      GFR Normal >60  Chronic Kidney Disease <60  Kidney Failure <15    The GFR formula is only valid for adults with stable renal function between ages 18 and 70.    Phosphorus [[id number]]  (Normal) Collected: 07/10/24 0309    Specimen: Blood from Arm, Right Updated: 07/10/24 0434     Phosphorus 2.7 mg/dL     Protime-INR [[id number]]  (Normal) Collected: 07/10/24 0309    Specimen: Blood from Arm, Right Updated: 07/10/24 0402     Protime 11.6 Seconds      INR 1.07    CBC & Differential [[id number]]  (Abnormal) Collected: 07/10/24 0309    Specimen: Blood from Arm, Right Updated: 07/10/24 0352    Narrative:      The following orders were created for panel order CBC & Differential.  Procedure                               Abnormality         Status                     ---------                               -----------         ------                     CBC Auto Differential[[id number]]        Abnormal            Final result                 Please view results for these tests on the individual orders.    CBC Auto Differential [[id number]]  (Abnormal) Collected: 07/10/24 0309    Specimen: Blood from Arm, Right Updated: 07/10/24 0352     WBC 7.31 10*3/mm3      RBC 3.96 10*6/mm3      Hemoglobin 9.5 g/dL      Hematocrit 31.5 %      MCV 79.5 fL      MCH 24.0 pg      MCHC 30.2 g/dL      RDW 19.3 %      RDW-SD 51.5 fl      MPV 11.2 fL       Platelets 271 10*3/mm3      Neutrophil % 65.1 %      Lymphocyte % 23.8 %      Monocyte % 7.9 %      Eosinophil % 2.1 %      Basophil % 0.7 %      Immature Grans % 0.4 %      Neutrophils, Absolute 4.76 10*3/mm3      Lymphocytes, Absolute 1.74 10*3/mm3      Monocytes, Absolute 0.58 10*3/mm3      Eosinophils, Absolute 0.15 10*3/mm3      Basophils, Absolute 0.05 10*3/mm3      Immature Grans, Absolute 0.03 10*3/mm3      nRBC 0.0 /100 WBC     Occult Blood, Fecal By Immunoassay - Stool, Per Rectum [[id number]]  (Abnormal) Collected: 07/09/24 1808    Specimen: Stool from Per Rectum Updated: 07/09/24 1848     Occult Blood, Fecal by Immunoassay Positive            Imaging Results (Last 24 Hours)       ** No results found for the last 24 hours. **            EKG        I personally viewed and interpreted the patient's EKG/Telemetry data:    ECHOCARDIOGRAM:  Results for orders placed during the hospital encounter of 07/05/24    Adult Transthoracic Echo Complete w/ Color, Spectral and Contrast if necessary per protocol    Interpretation Summary    Left ventricular ejection fraction appears to be 46 - 50%.    Left ventricular wall thickness is consistent with moderate concentric hypertrophy.    The left atrial cavity is severely dilated.    The right atrial cavity is moderate to severely  dilated.    Abnormal mitral valve structure consistent with dilated annulus.    Moderate to severe mitral valve regurgitation is present with a centrally-directed jet noted.    Moderate to severe tricuspid valve regurgitation is present.    Estimated right ventricular systolic pressure from tricuspid regurgitation is moderately elevated (45-55 mmHg).    Transthoracic echocardiography reveals moderate LVH with overall EF between 45 to 50%  Moderate to severe central mitral regurgitation and moderate to severe TR with moderate pulm hypertension with mild to moderate AI.  Severe left atrial enlargement and moderate right atrial enlargement and no  effusion.    Electronically signed by Baljeet Valero MD, 07/06/24, 3:26 PM EDT.       STRESS MYOVIEW:  Results for orders placed during the hospital encounter of 03/31/21    Stress Test With Myocardial Perfusion One Day    Interpretation Summary  · Findings consistent with a normal ECG stress test.  · Left ventricular ejection fraction is hyperdynamic (Calculated EF > 70%). .  · Impressions are consistent with a study indicating uncertain risk.  · Large apical defect with some reperfusion during the rest images cannot rule out ischemia EF 76% Clinical correlation is recommended.    Electronically signed by KALYANI Tay, 04/01/21, 11:25 AM EDT.       CARDIAC CATHETERIZATION:  Results for orders placed during the hospital encounter of 03/31/21    Cardiac Catheterization/Vascular Study    Narrative  CARDIAC CATHETERIZATION REPORT    DATE OF PROCEDURE: 4/2/2021      INDICATION FOR PROCEDURE: Abnormal stress test    PROCEDURE PERFORMED: Left heart catheterization, coronary angiography,    PROCEDURE COMMENTS:    All the risks and benefits explained with the patient informed consent was obtained from the patient.  Patient was brought to the cardiac catheterization laboratory placed on the table draped and prepped in the usual sterile fashion.  2% lidocaine was used to anesthetize the right groin area.  Using the modified Seldinger technique 5 [place] sheath was inserted into the right femoral artery.    5 [place] JL4 catheter was used to perform the selective left coronary angiography    5 [place] JR4 catheter was used to perform the selective right coronary angiography    Angio-Seal was placed after exchanging five [place] sheath with six [place] sheath    Patient tolerated procedure well without any immediate complications      FINDINGS:    1. HEMODYNAMICS:  LV end-diastolic pressure 7 mmHg, /80 mmHg.    2. LEFT VENTRICULOGRAPHY: Not done patient had echocardiogram    3. CORONARY ANGIOGRAPHY:  Dominance  right  Left Main: Large-caliber vessel bifurcates into LAD circumflex artery 0% stenosis  LAD: Large-caliber vessel gives rise to couple of medium caliber diagonal arteries multiple septal branches reaches the apex mild luminal irregularities noted less than 10% stenosis  CIRC: Large-caliber vessel gives rise to marginal lateral branches less than 10% stenosis  RCA: Large-caliber dominant artery gives rise to PDA and PL branches less than 5 to 10% stenosis    SUMMARY: No obstructive coronary artery disease    RECOMMENDATIONS: Evaluate for noncardiac causes of symptoms aggressive cardiac risk factor modification       OTHER:         Assessment & Plan     Afib with RVR  Sick sinus syndrome  s/p dual chamber (1/2023)   Recurrent AF   Continue amiodarone, beta blocker   Eliquis for anticoagulation   Patient initially had plans for cardioversion but her heart rate is better and she is having GI workup done and hence we will continue medical therapy and follow as an outpatient  EP following   Monitor and replace electrolytes per protocol  TSH elevated, T4 normal     HFmEF  Valvular disease  Echocardiogram with LVEF 46-50% moderate to severe MR and TR  proBNP >10,000  Beta blocker  IV diuresis     Hypertension   Blood pressure well controlled  Continue beta blocker     CAD  Cardiac catheterization form 2021 with minimal disease   Denies chest pain   Troponin mildly elevated likely secondary to afib RVR  Lipid panel with total 107, HDL 42, LDL 49     Anemia  Occult stool ordered  H&H 8.8/29.1  On ferrlecit   GI consulted     Flash Gonzalez MD  07/10/24  11:58 EDT

## 2024-07-10 NOTE — PROGRESS NOTES
[facility] MEDICINE SERVICE  DAILY PROGRESS NOTE    NAME: Catalina Moreno  : 1940  MRN: [id number]      LOS: 5 days     PROVIDER OF SERVICE: Zuleima Canales MD    Chief Complaint: Atrial fibrillation with RVR    Subjective:     Interval History:  History taken from: patient  Patient Complaints: Feels fine, no shortness of breath or chest pain  Patient Denies:      Review of Systems: Negative except as described above  Review of Systems    Objective:     Vital Signs  Temp:  [97.3 °F (36.3 °C)-97.7 °F (36.5 °C)] 97.6 °F (36.4 °C)  Heart Rate:  [77-98] 77  Resp:  [12-18] 13  BP: (111-162)/(58-88) 115/58  Flow (L/min):  [1-10] 10   Body mass index is 29.23 kg/m².    Physical Exam Physical Exam Eyes:      Pupils: Pupils are equal, round, and reactive to light.   Cardiovascular:      Rate and Rhythm: Normal rate. Rhythm irregular.   Pulmonary:      Breath sounds: Normal breath sounds.   Abdominal:      General: Bowel sounds are normal.   Musculoskeletal:         General: Normal range of motion.   Neurological:      General: No focal deficit present.      Mental Status: She is alert and oriented to person, place, and time.   Physical Exam    Scheduled Meds   amiodarone, 200 mg, Oral, Q8H   Followed by  [START ON 2024] amiodarone, 200 mg, Oral, Q12H   Followed by  [START ON 2024] amiodarone, 200 mg, Oral, Daily  [Held by provider] apixaban, 5 mg, Oral, Q12H  metoprolol succinate XL, 25 mg, Oral, Q24H  pantoprazole, 40 mg, Intravenous, Q AM  pregabalin, 50 mg, Oral, Q8H  vitamin D3, 5,000 Units, Oral, Daily       PRN Meds     acetaminophen **OR** acetaminophen **OR** acetaminophen    senna-docusate sodium **AND** polyethylene glycol **AND** bisacodyl **AND** bisacodyl    Calcium Replacement - Follow Nurse / BPA Driven Protocol    HYDROcodone-acetaminophen    Magnesium Standard Dose Replacement - Follow Nurse / BPA Driven Protocol    melatonin    nitroglycerin    ondansetron ODT **OR**  ondansetron    Phosphorus Replacement - Follow Nurse / BPA Driven Protocol    Potassium Replacement - Follow Nurse / BPA Driven Protocol    sodium chloride    [COMPLETED] Insert Peripheral IV **AND** sodium chloride   Infusions  lactated ringers, 50 mL/hr, Last Rate: 50 mL/hr (07/09/24 1439)          Diagnostic Data    Results from last 7 days   Lab Units 07/10/24  0309   WBC 10*3/mm3 7.31   HEMOGLOBIN g/dL 9.5*   HEMATOCRIT % 31.5*   PLATELETS 10*3/mm3 271   GLUCOSE mg/dL 93   CREATININE mg/dL 0.93   BUN mg/dL 12   SODIUM mmol/L 135*   POTASSIUM mmol/L 4.3   AST (SGOT) U/L 32   ALT (SGPT) U/L 6   ALK PHOS U/L 82   BILIRUBIN mg/dL 0.6   ANION GAP mmol/L 11.3       No radiology results for the last day      I reviewed the patient's new clinical results.    Assessment/Plan:     Active and Resolved Problems  Active Hospital Problems    Diagnosis  POA    **Atrial fibrillation with RVR [I48.91]  Yes    Iron deficiency anemia [D50.9]  Unknown    Atrial flutter with rapid ventricular response [I48.92]  Yes    Chronic HFrEF (heart failure with reduced ejection fraction) [I50.22]  Yes    Presence of cardiac pacemaker [Z95.0]  Yes    Essential (primary) hypertension [I10]  Yes      Resolved Hospital Problems   No resolved problems to display.       Atrial fibrillation with RVR  -Was initially on Cardizem, discontinued and started on IV amiodarone as per cardiology  - TTE on 7/6 at 24 showed LVEF 46 to 50% with severe dilation of right and left atrial cavity  -BP stable   -Chadsvasc 3+ was on Eliquis however paused by GI as she is scheduled to undergo EGD/colonoscopy tomorrow  -Cardiology consulted, restarted beta-blocker and switched amnio IV to p.o.   -Was initially scheduled for cardioversion with cardiology however now recommend continuing with medical management  - TSH elevated, free T4 normal     H/O HFrEF  - BNP 10,939  - last echo 9/7/23 with EF 51-55%  -TTE shows EF of 46 to 50% with moderate to severe MR and TR  -  CXR shows cardiomegaly with pulmonary vascular congestion, will administer a few doses of Lasix however will need to be cautious of blood pressure   -monitor I's and O's        Frequent Falls  - lives alone, no family for support  - frequent falls and admissions  - patient open to discussing assisted living facility   -PT/OT/CM consulted      Neuropathic pain/RLS  Lyrica ordered, with underlying iron deficiency anemia we will need to replete iron stores before making any changes to her medication to assess for true response  A1c 6.27  vitamin D Low, will start on supplementation     Iron deficiency anemia  - Iron panel reviewed, will start patient on IV iron supplementation    - scheduled for EGD/colonoscopy with GI today  - Transfuse if <7      Subclinical hypothyroidism  - TSH 5.75, free T4 WNL       VTE Prophylaxis:  Pharmacologic VTE prophylaxis orders are present.         Code status is   Code Status and Medical Interventions:   Ordered at: 07/05/24 1342     Level Of Support Discussed With:    Patient     Code Status (Patient has no pulse and is not breathing):    CPR (Attempt to Resuscitate)     Medical Interventions (Patient has pulse or is breathing):    Full Support       Plan for disposition:    Time: 30 minutes    Signature: Electronically signed by Zuleima Canales MD, 07/10/24, 15:14 EDT.  [facility] Hospitalist Team

## 2024-07-11 ENCOUNTER — READMISSION MANAGEMENT (OUTPATIENT)
Dept: CALL CENTER | Facility: HOSPITAL | Age: 84
End: 2024-07-11
Payer: MEDICARE

## 2024-07-11 VITALS
HEIGHT: 63 IN | BODY MASS INDEX: 29.23 KG/M2 | HEART RATE: 88 BPM | TEMPERATURE: 98.1 F | DIASTOLIC BLOOD PRESSURE: 61 MMHG | WEIGHT: 165 LBS | SYSTOLIC BLOOD PRESSURE: 100 MMHG | RESPIRATION RATE: 19 BRPM | OXYGEN SATURATION: 93 %

## 2024-07-11 LAB
ANION GAP SERPL CALCULATED.3IONS-SCNC: 7.7 MMOL/L (ref 5–15)
BASOPHILS # BLD AUTO: 0.04 10*3/MM3 (ref 0–0.2)
BASOPHILS NFR BLD AUTO: 0.6 % (ref 0–1.5)
BUN SERPL-MCNC: 13 MG/DL (ref 8–23)
BUN/CREAT SERPL: 14.9 (ref 7–25)
CALCIUM SPEC-SCNC: 8.9 MG/DL (ref 8.6–10.5)
CHLORIDE SERPL-SCNC: 101 MMOL/L (ref 98–107)
CO2 SERPL-SCNC: 29.3 MMOL/L (ref 22–29)
CREAT SERPL-MCNC: 0.87 MG/DL (ref 0.57–1)
DEPRECATED RDW RBC AUTO: 53.2 FL (ref 37–54)
EGFRCR SERPLBLD CKD-EPI 2021: 66.2 ML/MIN/1.73
EOSINOPHIL # BLD AUTO: 0.16 10*3/MM3 (ref 0–0.4)
EOSINOPHIL NFR BLD AUTO: 2.4 % (ref 0.3–6.2)
ERYTHROCYTE [DISTWIDTH] IN BLOOD BY AUTOMATED COUNT: 19.8 % (ref 12.3–15.4)
GLUCOSE SERPL-MCNC: 89 MG/DL (ref 65–99)
HCT VFR BLD AUTO: 30.6 % (ref 34–46.6)
HGB BLD-MCNC: 9.2 G/DL (ref 12–15.9)
IMM GRANULOCYTES # BLD AUTO: 0.02 10*3/MM3 (ref 0–0.05)
IMM GRANULOCYTES NFR BLD AUTO: 0.3 % (ref 0–0.5)
LYMPHOCYTES # BLD AUTO: 1.44 10*3/MM3 (ref 0.7–3.1)
LYMPHOCYTES NFR BLD AUTO: 21.3 % (ref 19.6–45.3)
MAGNESIUM SERPL-MCNC: 2 MG/DL (ref 1.6–2.4)
MCH RBC QN AUTO: 24.7 PG (ref 26.6–33)
MCHC RBC AUTO-ENTMCNC: 30.1 G/DL (ref 31.5–35.7)
MCV RBC AUTO: 82 FL (ref 79–97)
MONOCYTES # BLD AUTO: 0.43 10*3/MM3 (ref 0.1–0.9)
MONOCYTES NFR BLD AUTO: 6.4 % (ref 5–12)
NEUTROPHILS NFR BLD AUTO: 4.67 10*3/MM3 (ref 1.7–7)
NEUTROPHILS NFR BLD AUTO: 69 % (ref 42.7–76)
NRBC BLD AUTO-RTO: 0 /100 WBC (ref 0–0.2)
PHOSPHATE SERPL-MCNC: 3.1 MG/DL (ref 2.5–4.5)
PLATELET # BLD AUTO: 258 10*3/MM3 (ref 140–450)
PMV BLD AUTO: 10.2 FL (ref 6–12)
POTASSIUM SERPL-SCNC: 4.4 MMOL/L (ref 3.5–5.2)
RBC # BLD AUTO: 3.73 10*6/MM3 (ref 3.77–5.28)
SODIUM SERPL-SCNC: 138 MMOL/L (ref 136–145)
WBC NRBC COR # BLD AUTO: 6.76 10*3/MM3 (ref 3.4–10.8)

## 2024-07-11 PROCEDURE — 97530 THERAPEUTIC ACTIVITIES: CPT

## 2024-07-11 PROCEDURE — 99232 SBSQ HOSP IP/OBS MODERATE 35: CPT | Mod: FS | Performed by: NURSE PRACTITIONER

## 2024-07-11 PROCEDURE — 84100 ASSAY OF PHOSPHORUS: CPT

## 2024-07-11 PROCEDURE — 97535 SELF CARE MNGMENT TRAINING: CPT

## 2024-07-11 PROCEDURE — 85025 COMPLETE CBC W/AUTO DIFF WBC: CPT

## 2024-07-11 PROCEDURE — 97116 GAIT TRAINING THERAPY: CPT

## 2024-07-11 PROCEDURE — 80048 BASIC METABOLIC PNL TOTAL CA: CPT

## 2024-07-11 PROCEDURE — 99232 SBSQ HOSP IP/OBS MODERATE 35: CPT

## 2024-07-11 PROCEDURE — 83735 ASSAY OF MAGNESIUM: CPT

## 2024-07-11 PROCEDURE — 25010000002 ONDANSETRON PER 1 MG

## 2024-07-11 RX ORDER — FERROUS GLUCONATE 324(38)MG
324 TABLET ORAL EVERY OTHER DAY
Qty: 30 TABLET | Refills: 0 | Status: SHIPPED | OUTPATIENT
Start: 2024-07-11 | End: 2024-07-11 | Stop reason: SDUPTHER

## 2024-07-11 RX ORDER — AMIODARONE HYDROCHLORIDE 200 MG/1
TABLET ORAL
Qty: 94 TABLET | Refills: 0 | Status: SHIPPED | OUTPATIENT
Start: 2024-07-11 | End: 2024-07-11 | Stop reason: SDUPTHER

## 2024-07-11 RX ORDER — PANTOPRAZOLE SODIUM 40 MG/1
40 TABLET, DELAYED RELEASE ORAL DAILY
Qty: 30 TABLET | Refills: 0 | Status: SHIPPED | OUTPATIENT
Start: 2024-07-11 | End: 2024-08-10

## 2024-07-11 RX ORDER — HYDROCODONE BITARTRATE AND ACETAMINOPHEN 5; 325 MG/1; MG/1
1 TABLET ORAL ONCE AS NEEDED
Status: COMPLETED | OUTPATIENT
Start: 2024-07-11 | End: 2024-07-11

## 2024-07-11 RX ORDER — FERROUS GLUCONATE 324(38)MG
324 TABLET ORAL EVERY OTHER DAY
Qty: 30 TABLET | Refills: 0 | Status: SHIPPED | OUTPATIENT
Start: 2024-07-11 | End: 2024-09-09

## 2024-07-11 RX ORDER — METOPROLOL SUCCINATE 25 MG/1
25 TABLET, EXTENDED RELEASE ORAL DAILY
Qty: 30 TABLET | Refills: 0 | Status: SHIPPED | OUTPATIENT
Start: 2024-07-11 | End: 2024-08-10

## 2024-07-11 RX ORDER — AMIODARONE HYDROCHLORIDE 200 MG/1
TABLET ORAL
Qty: 94 TABLET | Refills: 0 | Status: SHIPPED | OUTPATIENT
Start: 2024-07-11 | End: 2024-09-05

## 2024-07-11 RX ADMIN — Medication 5 MG: at 00:33

## 2024-07-11 RX ADMIN — PREGABALIN 50 MG: 25 CAPSULE ORAL at 06:36

## 2024-07-11 RX ADMIN — AMIODARONE HYDROCHLORIDE 200 MG: 200 TABLET ORAL at 08:04

## 2024-07-11 RX ADMIN — Medication 5000 UNITS: at 08:04

## 2024-07-11 RX ADMIN — PREGABALIN 50 MG: 25 CAPSULE ORAL at 14:36

## 2024-07-11 RX ADMIN — HYDROCODONE BITARTRATE AND ACETAMINOPHEN 1 TABLET: 5; 325 TABLET ORAL at 07:38

## 2024-07-11 RX ADMIN — PANTOPRAZOLE SODIUM 40 MG: 40 INJECTION, POWDER, FOR SOLUTION INTRAVENOUS at 06:36

## 2024-07-11 RX ADMIN — ONDANSETRON 4 MG: 2 INJECTION INTRAMUSCULAR; INTRAVENOUS at 06:54

## 2024-07-11 RX ADMIN — METOPROLOL SUCCINATE 25 MG: 25 TABLET, EXTENDED RELEASE ORAL at 08:04

## 2024-07-11 RX ADMIN — AMIODARONE HYDROCHLORIDE 200 MG: 200 TABLET ORAL at 00:33

## 2024-07-11 NOTE — PLAN OF CARE
Problem: Adult Inpatient Plan of Care  Goal: Absence of Hospital-Acquired Illness or Injury  Intervention: Identify and Manage Fall Risk  Recent Flowsheet Documentation  Taken 7/11/2024 0400 by Amauri Watts RN  Safety Promotion/Fall Prevention:   safety round/check completed   room organization consistent   nonskid shoes/slippers when out of bed   fall prevention program maintained   clutter free environment maintained   assistive device/personal items within reach  Taken 7/11/2024 0200 by Amauri Watts RN  Safety Promotion/Fall Prevention:   safety round/check completed   room organization consistent   nonskid shoes/slippers when out of bed   fall prevention program maintained   assistive device/personal items within reach   clutter free environment maintained  Taken 7/11/2024 0000 by Amauri Watts RN  Safety Promotion/Fall Prevention: activity supervised  Taken 7/10/2024 2200 by Amauri Watts RN  Safety Promotion/Fall Prevention:   safety round/check completed   room organization consistent   nonskid shoes/slippers when out of bed   fall prevention program maintained   clutter free environment maintained   assistive device/personal items within reach  Taken 7/10/2024 2000 by Amauri Watts RN  Safety Promotion/Fall Prevention:   safety round/check completed   room organization consistent   nonskid shoes/slippers when out of bed   fall prevention program maintained   clutter free environment maintained   assistive device/personal items within reach  Intervention: Prevent Skin Injury  Recent Flowsheet Documentation  Taken 7/11/2024 0400 by Amauri Watts RN  Body Position: position changed independently  Skin Protection:   adhesive use limited   tubing/devices free from skin contact  Taken 7/11/2024 0000 by Amauri Watts RN  Body Position: position changed independently  Taken 7/10/2024 2000 by Amauri Watts RN  Body Position: position changed independently  Skin Protection: adhesive  use limited  Intervention: Prevent and Manage VTE (Venous Thromboembolism) Risk  Recent Flowsheet Documentation  Taken 7/10/2024 2000 by Amauri Watts RN  VTE Prevention/Management:   sequential compression devices off   patient refused intervention  Intervention: Prevent Infection  Recent Flowsheet Documentation  Taken 7/11/2024 0400 by Amauri Watts RN  Infection Prevention:   environmental surveillance performed   hand hygiene promoted   personal protective equipment utilized   rest/sleep promoted   single patient room provided  Taken 7/11/2024 0000 by Amauri Watts RN  Infection Prevention:   environmental surveillance performed   hand hygiene promoted   personal protective equipment utilized   rest/sleep promoted   single patient room provided  Taken 7/10/2024 2000 by Amauri Watts RN  Infection Prevention:   environmental surveillance performed   hand hygiene promoted   personal protective equipment utilized   rest/sleep promoted   single patient room provided  Goal: Optimal Comfort and Wellbeing  Intervention: Provide Person-Centered Care  Recent Flowsheet Documentation  Taken 7/10/2024 2000 by Amauri Watts RN  Trust Relationship/Rapport: care explained     Problem: Hypertension Comorbidity  Goal: Blood Pressure in Desired Range  Intervention: Maintain Blood Pressure Management  Recent Flowsheet Documentation  Taken 7/11/2024 0000 by Amauri Watts RN  Medication Review/Management: medications reviewed  Taken 7/10/2024 2000 by Amauri Watts RN  Syncope Management: position changed slowly  Medication Review/Management: medications reviewed     Problem: Pain Chronic (Persistent) (Comorbidity Management)  Goal: Acceptable Pain Control and Functional Ability  Intervention: Manage Persistent Pain  Recent Flowsheet Documentation  Taken 7/11/2024 0000 by Amauri Watts RN  Medication Review/Management: medications reviewed  Taken 7/10/2024 2000 by Amauri Watts RN  Bowel  Elimination Promotion: adequate fluid intake promoted  Medication Review/Management: medications reviewed  Intervention: Optimize Psychosocial Wellbeing  Recent Flowsheet Documentation  Taken 7/11/2024 0000 by Amauri Watts RN  Diversional Activities: television  Family/Support System Care: support provided  Taken 7/10/2024 2000 by Amauri Watts RN  Supportive Measures: active listening utilized  Diversional Activities: television  Family/Support System Care: support provided     Problem: Fall Injury Risk  Goal: Absence of Fall and Fall-Related Injury  Intervention: Identify and Manage Contributors  Recent Flowsheet Documentation  Taken 7/11/2024 0000 by Amauri Watts RN  Medication Review/Management: medications reviewed  Taken 7/10/2024 2000 by Amauri Watts RN  Medication Review/Management: medications reviewed  Intervention: Promote Injury-Free Environment  Recent Flowsheet Documentation  Taken 7/11/2024 0400 by Amauri Watts RN  Safety Promotion/Fall Prevention:   safety round/check completed   room organization consistent   nonskid shoes/slippers when out of bed   fall prevention program maintained   clutter free environment maintained   assistive device/personal items within reach  Taken 7/11/2024 0200 by Amauri Watts RN  Safety Promotion/Fall Prevention:   safety round/check completed   room organization consistent   nonskid shoes/slippers when out of bed   fall prevention program maintained   assistive device/personal items within reach   clutter free environment maintained  Taken 7/11/2024 0000 by Amauri Watts RN  Safety Promotion/Fall Prevention: activity supervised  Taken 7/10/2024 2200 by Amauri Watts RN  Safety Promotion/Fall Prevention:   safety round/check completed   room organization consistent   nonskid shoes/slippers when out of bed   fall prevention program maintained   clutter free environment maintained   assistive device/personal items within reach  Taken  7/10/2024 2000 by Amauri Watts RN  Safety Promotion/Fall Prevention:   safety round/check completed   room organization consistent   nonskid shoes/slippers when out of bed   fall prevention program maintained   clutter free environment maintained   assistive device/personal items within reach     Problem: Dysrhythmia  Goal: Normalized Cardiac Rhythm  Intervention: Monitor and Manage Cardiac Rhythm Effect  Recent Flowsheet Documentation  Taken 7/10/2024 2000 by Amauri Watts RN  VTE Prevention/Management:   sequential compression devices off   patient refused intervention     Problem: Skin Injury Risk Increased  Goal: Skin Health and Integrity  Intervention: Optimize Skin Protection  Recent Flowsheet Documentation  Taken 7/11/2024 0400 by Amauri Watts RN  Head of Bed ([facility]) Positioning: [facility] elevated  Pressure Reduction Devices: pressure-redistributing mattress utilized  Skin Protection:   adhesive use limited   tubing/devices free from skin contact  Taken 7/11/2024 0000 by Amauri Watts RN  Head of Bed ([facility]) Positioning: HOB elevated  Taken 7/10/2024 2000 by Amauri Watts RN  Pressure Reduction Techniques: pressure points protected  Head of Bed ([facility]) Positioning: [facility] elevated  Pressure Reduction Devices: pressure-redistributing mattress utilized  Skin Protection: adhesive use limited   Goal Outcome Evaluation:      Patient has been complaining of an aching pain in her legs. Patient has been calm and cooperative with care.

## 2024-07-11 NOTE — PLAN OF CARE
Goal Outcome Evaluation:           Progress: improving  Outcome Evaluation: Patient has discharge orders in and is going home on home health. [facility] was contacted to come get the patient soon.

## 2024-07-11 NOTE — PROGRESS NOTES
Enter Query Response Below      Query Response: Acute kidney injury (ARIELA) was not supported              If applicable, please update the problem list.

## 2024-07-11 NOTE — PLAN OF CARE
Assessment: Catalina Moreno presents with endurance and functional mobility impairments which indicate the need for skilled intervention. Pt reports feeling very weak but continues to plan for DC home with home health; resistant to a higher level of care. Edu provided for home safety, energy conservation. Tolerating session today without incident. Will continue to follow and progress as tolerated.

## 2024-07-11 NOTE — CASE MANAGEMENT/SOCIAL WORK
Case Management Discharge Note      Final Note: ciro           Home Medical Care Coordination complete.      Service Provider Selected Services Address Phone Fax Patient Preferred    [facility] - [facility] ,  Home Rehabilitation [address] [place] [phone] --               Transportation Services  Private: Car    Final Discharge Disposition Code: 06 - home with home health care

## 2024-07-11 NOTE — THERAPY TREATMENT NOTE
"Subjective: Pt agreeable to therapeutic plan of care.    Objective:     Bed mobility - Supervision  Transfers - Supervision and with rolling walker  Ambulation - 150 feet Supervision and with rolling walker    Vitals: WNL    Pain: 0 VAS    Education: Provided education on the importance of mobility in the acute care setting, Transfer Training, Gait Training, and Energy conservation strategies    Assessment: Catalina Moreno presents with endurance and functional mobility impairments which indicate the need for skilled intervention. Pt reports feeling very weak but continues to plan for DC home with home health; resistant to a higher level of care. Edu provided for home safety, energy conservation. Tolerating session today without incident. Will continue to follow and progress as tolerated.     Plan/Recommendations:   If medically appropriate, Moderate Intensity Therapy recommended post-acute care. This is recommended as therapy feels the patient would require 3-4 days per week and wouldn't tolerate \"3 hour daily\" rehab intensity. SNF would be the preferred choice. If the patient does not agree to SNF, arrange HH or OP depending on home bound status. If patient is medically complex, consider LTACH. Pt requires no DME at discharge.     Pt desires Home with Home Health at discharge. Pt cooperative; agreeable to therapeutic recommendations and plan of care.         Basic Mobility 6-click:  Rollin = Total, A lot = 2, A little = 3; 4 = None  Supine>Sit:   1 = Total, A lot = 2, A little = 3; 4 = None   Sit>Stand with arms:  1 = Total, A lot = 2, A little = 3; 4 = None  Bed>Chair:   1 = Total, A lot = 2, A little = 3; 4 = None  Ambulate in room:  1 = Total, A lot = 2, A little = 3; 4 = None  3-5 Steps with railin = Total, A lot = 2, A little = 3; 4 = None  Score: 22    Modified [place]: N/A = No pre-op stroke/TIA    Post-Tx Position: Up in Chair, Alarms activated, and Call light and personal items within " reach  PPE: gloves

## 2024-07-11 NOTE — THERAPY TREATMENT NOTE
"Subjective: Pt agreeable to therapeutic plan of care.  Cognition: oriented to Person, Place, and Time    Objective:     Bed Mobility: CGA   Functional Transfers: CGA     Balance: supported, static, and standing CGA  Functional Ambulation: CGA    Lower Body Dressing: Supervision and SBA  ADL Position: supported sitting and supported standing    Toileting: Supervision and SBA      Vitals: WNL    Pain: 0 VAS  Location: n/a  Interventions for pain: N/A  Education: Provided education on the importance of mobility in the acute care setting      Assessment: Catalina Moreno presents with ADL impairments affecting function including endurance / activity tolerance. Demonstrated functioning below baseline abilities indicate the need for continued skilled intervention while inpatient. Tolerating session today without incident. Will continue to follow and progress as tolerated.     Plan/Recommendations:   Low Intensity Therapy recommended post-acute care - This is recommended as therapy feels this patient would require 2-3 visits per week. OP or HH would be the best option depending on patient's home bound status. Consider, if the patient has other  \"skilled\" needs such as wounds, IV antibiotics, etc. Combined with \"low intensity\" could also equate to a SNF. If patient is medically complex, consider LTAC.. Pt requires no DME at discharge.     Pt desires Home with Home Health at discharge. Pt cooperative; agreeable to therapeutic recommendations and plan of care.     Modified [place]: N/A = No pre-op stroke/TIA    Post-Tx Position: Up in Chair  PPE: gloves    "

## 2024-07-11 NOTE — PROGRESS NOTES
LOS: 6 days   Patient Care Team:  Flash Gonzalez MD as PCP - General (Cardiology)  Flash Gonzalez MD as Consulting Physician (Cardiology)      Subjective     Interval History:     Subjective: Patient with no new complaints.  Denies any bowel movements following endoscopy yesterday.  No abdominal pain or nausea/vomiting.      ROS:   No chest pain, shortness of breath, or cough.        Medication Review:     Current Facility-Administered Medications:     acetaminophen (TYLENOL) tablet 650 mg, 650 mg, Oral, Q4H PRN, 650 mg at 07/10/24 2228 **OR** acetaminophen (TYLENOL) 160 MG/5ML oral solution 650 mg, 650 mg, Oral, Q4H PRN **OR** acetaminophen (TYLENOL) suppository 650 mg, 650 mg, Rectal, Q4H PRN, Christal Tubbs APRN    [COMPLETED] amiodarone 150 mg in 100 mL D5W (loading dose), 150 mg, Intravenous, Once, 150 mg at 24 1345 **FOLLOWED BY** [] amiodarone 360 mg in 200 mL D5W infusion, 1 mg/min, Intravenous, Continuous, Stopped at 24 1944 **FOLLOWED BY** [] amiodarone 360 mg in 200 mL D5W infusion, 0.5 mg/min, Intravenous, Continuous, Stopped at 24 1702 **FOLLOWED BY** [COMPLETED] amiodarone (PACERONE) tablet 200 mg, 200 mg, Oral, Once, 200 mg at 24 1650 **FOLLOWED BY** amiodarone (PACERONE) tablet 200 mg, 200 mg, Oral, Q8H, 200 mg at 24 0804 **FOLLOWED BY** [START ON 2024] amiodarone (PACERONE) tablet 200 mg, 200 mg, Oral, Q12H **FOLLOWED BY** [START ON 2024] amiodarone (PACERONE) tablet 200 mg, 200 mg, Oral, Daily, AlonsoDemetrice kingsley APRN    [Held by provider] apixaban (ELIQUIS) tablet 5 mg, 5 mg, Oral, Q12H, Christal Tubbs APRN, 5 mg at 24 0748    sennosides-docusate (PERICOLACE) 8.6-50 MG per tablet 2 tablet, 2 tablet, Oral, BID PRN, 2 tablet at 24 0747 **AND** polyethylene glycol (MIRALAX) packet 17 g, 17 g, Oral, Daily PRN **AND** bisacodyl (DULCOLAX) EC tablet 5 mg, 5 mg, Oral, Daily PRN, 5 mg at 24 0930 **AND** bisacodyl (DULCOLAX) suppository 10 mg,  10 mg, Rectal, Daily PRN, Christal Tubbs APRN    Calcium Replacement - Follow Nurse / BPA Driven Protocol, , Does not apply, PRNArley Lea, APRN    lactated ringers infusion, 50 mL/hr, Intravenous, Continuous, Zuleima Canales MD, Last Rate: 50 mL/hr at 07/09/24 1439, 50 mL/hr at 07/09/24 1439    Magnesium Standard Dose Replacement - Follow Nurse / BPA Driven Protocol, , Does not apply, PRNArley Lea APRN    melatonin tablet 5 mg, 5 mg, Oral, Nightly PRN, Christal Tubbs APRN, 5 mg at 07/11/24 0033    metoprolol succinate XL (TOPROL-XL) 24 hr tablet 25 mg, 25 mg, Oral, Q24H, Alonso, Demetrice, APRN, 25 mg at 07/11/24 0804    nitroglycerin (NITROSTAT) SL tablet 0.4 mg, 0.4 mg, Sublingual, Q5 Min PRN, Christal Tubbs APRN    ondansetron ODT (ZOFRAN-ODT) disintegrating tablet 4 mg, 4 mg, Oral, Q6H PRN **OR** ondansetron (ZOFRAN) injection 4 mg, 4 mg, Intravenous, Q6H PRN, Christal Tubbs APRN, 4 mg at 07/11/24 0654    pantoprazole (PROTONIX) injection 40 mg, 40 mg, Intravenous, Q AM, Jessica Felix, APRN, 40 mg at 07/11/24 0636    Phosphorus Replacement - Follow Nurse / BPA Driven Protocol, , Does not apply, PRNArley Lea APRN    Potassium Replacement - Follow Nurse / BPA Driven Protocol, , Does not apply, PRNArley Lea, APRN    pregabalin (LYRICA) capsule 50 mg, 50 mg, Oral, Q8H, Zuleima Canales MD, 50 mg at 07/11/24 0636    sodium chloride 0.9 % flush 10 mL, 10 mL, Intravenous, PRN, Radu Nagy MD    [COMPLETED] Insert Peripheral IV, , , Once **AND** sodium chloride 0.9 % flush 10 mL, 10 mL, Intravenous, PRN, Krishna eLblanc, APRN, 10 mL at 07/09/24 0748    vitamin D3 capsule 5,000 Units, 5,000 Units, Oral, Daily, Zuleima Canales MD, 5,000 Units at 07/11/24 0804      Objective     Vital Signs  Vitals:    07/10/24 1850 07/10/24 2305 07/11/24 0355 07/11/24 0804   BP: 105/48 121/66 124/64 (!) 111/39   BP Location: Right arm Right arm Right arm Right arm   Patient Position: Lying Lying Lying Lying    Pulse: 86 70 84 96   Resp: (!) 31 9 12 23   Temp: 97.4 °F (36.3 °C) 97.3 °F (36.3 °C) 98.3 °F (36.8 °C) 98 °F (36.7 °C)   TempSrc: Oral Oral Oral Oral   SpO2: 97% 95% 99%    Weight:       Height:           Physical Exam:     General Appearance:    Awake and alert, in no acute distress   Head:    Normocephalic, without obvious abnormality   Eyes:          Conjunctivae normal, anicteric sclera   Throat:   No oral lesions, no thrush, oral mucosa moist   Neck:   No adenopathy, supple, no JVD   Lungs:     respirations regular, even and unlabored   Abdomen:     Soft, non-tender, no rebound or guarding, non-distended   Rectal:     Deferred   Extremities:   No edema, no cyanosis   Skin:   No bruising or rash, no jaundice        Results Review:    CBC    Results from last 7 days   Lab Units 07/11/24  0042 07/10/24  0309 07/09/24  0204 07/08/24  0309 07/07/24  0457 07/06/24  0358 07/05/24  0953   WBC 10*3/mm3 6.76 7.31 6.59 6.86 7.79 6.59 6.89   HEMOGLOBIN g/dL 9.2* 9.5* 8.8* 9.4* 9.6* 8.5* 9.1*   PLATELETS 10*3/mm3 258 271 248 260 251 231 254     CMP   Results from last 7 days   Lab Units 07/11/24  0042 07/10/24  0309 07/09/24  0204 07/08/24  1026 07/08/24  0309 07/07/24  1544 07/07/24  0457 07/06/24  0358 07/05/24  0953   SODIUM mmol/L 138 135* 135*  --  139  --  141 138 137   POTASSIUM mmol/L 4.4 4.3 4.1 3.8 3.1*  --  3.8 4.0 4.4   CHLORIDE mmol/L 101 96* 97*  --  100  --  105 106 103   CO2 mmol/L 29.3* 27.7 25.8  --  27.9  --  26.2 20.7* 19.5*   BUN mg/dL 13 12 16  --  14  --  20 23 26*   CREATININE mg/dL 0.87 0.93 1.25*  --  1.05*  --  1.09* 1.07* 1.15*   GLUCOSE mg/dL 89 93 109*  --  105*  --  125* 90 106*   ALBUMIN g/dL  --  4.1  --   --   --   --   --   --  4.5   BILIRUBIN mg/dL  --  0.6  --   --   --   --   --   --  1.2   ALK PHOS U/L  --  82  --   --   --   --   --   --  90   AST (SGOT) U/L  --  32  --   --   --   --   --   --  24   ALT (SGPT) U/L  --  6  --   --   --   --   --   --  9   MAGNESIUM mg/dL 2.0 1.9  1.9  --  1.8 1.7  --   --  2.0   PHOSPHORUS mg/dL 3.1 2.7 2.9  --  3.6  --   --   --   --      Cr Clearance Estimated Creatinine Clearance: 47.5 mL/min (by C-G formula based on SCr of 0.87 mg/dL).  Coag   Results from last 7 days   Lab Units 07/10/24  0309   INR  1.07     HbA1C   Lab Results   Component Value Date    HGBA1C 6.27 (H) 07/08/2024    HGBA1C 5.80 (H) 09/18/2023    HGBA1C 5.9 (H) 01/05/2023     Results from last 7 days   Lab Units 07/05/24  1156 07/05/24  0953   HSTROP T ng/L 17* 18*     Infection     UA      Microbiology Results (last 10 days)       ** No results found for the last 240 hours. **          Imaging Results (Last 72 Hours)       ** No results found for the last 72 hours. **            Assessment & Plan     ASSESSMENT:  -Iron deficiency anemia  -Nausea  -A-fib with RVR -on Eliquis  -ARIELA  -CHF  -CAD  -SSS s/p pacemaker  -History of cholecystectomy  -History of hysterectomy     PLAN:  Patient is an 84-year-old female with history of A-fib on Eliquis, CHF, CAD, and SSS s/p pacemaker who presented on 7/5 with complaints of dyspnea.  Found to be in A-fib with RVR.  Cardiology following.  GI has been asked to consult due to anemia.    Patient with no new complaints.  Denies any bowel movements following endoscopy yesterday.  No abdominal pain or nausea/vomiting.  S/p EGD/colonoscopy yesterday showing hiatal hernia, fundic gland polyps, diverticulosis, polyp in the sigmoid s/p polypectomy with 2 endoclips placed, and internal hemorrhoids.  Hemoglobin 9.2 from 9.5.  Continue to monitor H/H and transfuse as needed.  Patient has received IV iron infusion.  Okay to restart Eliquis tomorrow (7/12).   Continue PPI.  Okay for discharge from GI standpoint with outpatient follow-up in 6 to 8 weeks.  Will be available as inpatient as needed.      Electronically signed by KALYANI Dowell, 07/11/24, 11:35 AM EDT.

## 2024-07-11 NOTE — PLAN OF CARE
Goal Outcome Evaluation:      Pt. Demonstrates progress w/ functional mobility this date supervision for safety sit to stand transfer and ambulates w/ rolling walker support, setup assist to on shoes seated at EOB utilizing lateral lean to complete. Pt. States she feels weak, concern for safety at home and recommend HH therapy to assess ADL home safety.

## 2024-07-11 NOTE — DISCHARGE SUMMARY
[facility] Medicine Services  Discharge Summary    Date of Service: 2024  Patient Name: Catalina Moreno  : 1940  MRN: [id number]    Date of Admission: 2024  Discharge Diagnosis: A-fib with RVR  Date of Discharge: 2024  Primary Care Physician: Flash Gonzalez MD      Presenting Problem:   Sacral back pain [M53.3]  ARIELA (acute kidney injury) [N17.9]  Atrial fibrillation with RVR [I48.91]  Fall, initial encounter [W19.XXXA]  Right shoulder pain, unspecified chronicity [M25.511]    Active and Resolved Hospital Problems:  Active Hospital Problems    Diagnosis POA    **Atrial fibrillation with RVR [I48.91] Yes    Iron deficiency anemia [D50.9] Unknown    Atrial flutter with rapid ventricular response [I48.92] Yes    Chronic HFrEF (heart failure with reduced ejection fraction) [I50.22] Yes    Presence of cardiac pacemaker [Z95.0] Yes    Essential (primary) hypertension [I10] Yes      Resolved Hospital Problems   No resolved problems to display.         Hospital Course     HPI:  Catalina Moreno is a 84 y.o. female with a CMH of CHF, ARIELA, hypothyroidism, GERD who presented to [facility] on 2024 with shortness of breath that developed over the last three to four weeks. Patient reports not taking one of her medications but not remembering which ones. She has been taking her Eliquis daily for atrial fibrillation. Also, patient fell 3-4 days ago at home on her tailbone. She lives alone and reports having no familial support. Says her   about 7 years ago. Current complaint of low back pain, left leg pain and left shoulder pain. Reports shortness of breath is improved. Of note, patient was admitted here 24 - 3/26/24 for A fib RVR, and a fall with closed nasal bone fracture and left ankle sprain.      On ED evaluation, patient noted to be in A-fib RVR. She was started on a Cardizem bolus and drip. HR now in 80-90s. BNP 10,939. XR of sacrum, coccyx, lumbar  spine and right shoulder all negative for fracture. Troponin 18, 17. Hgb 8.9, baseline at 11.0. Cardiology consulted. Hospitalist team to admit for further medical management.     Hospital Course:  Atrial fibrillation with RVR  -Was initially on Cardizem, discontinued and started on IV amiodarone as per cardiology  - TTE on 7/6 at 24 showed LVEF 46 to 50% with severe dilation of right and left atrial cavity  -BP stable   -Chadsvasc 3+ was on Eliquis however paused by GI as she is scheduled to undergo EGD/colonoscopy.  EGD/colonoscopy positive for some gastritis and 2 polyps were removed  -Cardiology consulted, restarted beta-blocker and switched amnio IV to p.o.   -Was initially scheduled for cardioversion with cardiology however now recommend continuing with medical management  - TSH elevated, free T4 normal     H/O HFrEF  - BNP 10,939  - last echo 9/7/23 with EF 51-55%  -TTE shows EF of 46 to 50% with moderate to severe MR and TR  - CXR shows cardiomegaly with pulmonary vascular congestion, will administer a few doses of Lasix however will need to be cautious of blood pressure   -monitor I's and O's        Frequent Falls  - lives alone, no family for support  - frequent falls and admissions  - patient open to discussing assisted living facility   -PT/OT/CM consulted      Neuropathic pain/RLS  Lyrica ordered, with underlying iron deficiency anemia we will need to replete iron stores before making any changes to her medication to assess for true response  A1c 6.27  vitamin D Low, will start on supplementation     Iron deficiency anemia  - Iron panel reviewed, will start patient on IV iron supplementation    - scheduled for EGD/colonoscopy with GI today  - Transfuse if <7      Subclinical hypothyroidism  - TSH 5.75, free T4 WNL    Patient was seen and examined at bedside this morning.  States that she feels a little weak however adamantly refuses going to a nursing home or rehab as she cannot leave her dog with her  neighbor for an extended amount of time.  Discussed plan and she is willing to have home health, over.  Will have that arranged with case management.  Seen by DANGELO bond a.mProsper who state patient is stable for discharge.  Labs positive for iron deficiency anemia, received few doses of Ferrlicit, will send patient on p.o. iron.  Stable for discharge as per cardiology and patient to follow-up in 3 weeks for outpatient cardioversion    DISCHARGE Follow Up Recommendations for labs and diagnostics: Follow-up with PCP in 1 week, cardiology in 3 weeks.      Reasons For Change In Medications and Indications for New Medications:      Day of Discharge     Vital Signs:  Temp:  [97.3 °F (36.3 °C)-98.3 °F (36.8 °C)] 98 °F (36.7 °C)  Heart Rate:  [70-96] 96  Resp:  [9-31] 23  BP: (105-149)/(39-88) 111/39  Flow (L/min):  [10] 10    Physical Exam:  Physical Exam Eyes:      Pupils: Pupils are equal, round, and reactive to light.   Cardiovascular:      Rate and Rhythm: Normal rate. Rhythm irregular.   Pulmonary:      Breath sounds: Normal breath sounds.   Abdominal:      General: Bowel sounds are normal.   Musculoskeletal:         General: Normal range of motion.   Neurological:      General: No focal deficit present.      Mental Status: She is alert and oriented to person, place, and time.       Pertinent  and/or Most Recent Results     LAB RESULTS:      Lab 07/11/24  0042 07/10/24  0309 07/09/24  0204 07/08/24  0309 07/07/24  0457 07/06/24  0358 07/05/24  0953   WBC 6.76 7.31 6.59 6.86 7.79   < > 6.89   HEMOGLOBIN 9.2* 9.5* 8.8* 9.4* 9.6*   < > 9.1*   HEMATOCRIT 30.6* 31.5* 29.1* 31.0* 31.8*   < > 29.8*   PLATELETS 258 271 248 260 251   < > 254   NEUTROS ABS 4.67 4.76 3.56 4.32 5.32   < > 4.45   IMMATURE GRANS (ABS) 0.02 0.03  --  0.03 0.04  --  0.02   LYMPHS ABS 1.44 1.74  --  1.70 1.61  --  1.77   MONOS ABS 0.43 0.58  --  0.49 0.40  --  0.43   EOS ABS 0.16 0.15 0.07 0.27 0.35   < > 0.13   MCV 82.0 79.5 80.6 79.7 80.3   < > 78.8*    PROTIME  --  11.6  --   --   --   --   --     < > = values in this interval not displayed.         Lab 07/11/24  0042 07/10/24  0309 07/09/24  0204 07/08/24  1026 07/08/24  0309 07/07/24  1544 07/07/24  0457 07/06/24  1108   SODIUM 138 135* 135*  --  139  --  141  --    POTASSIUM 4.4 4.3 4.1 3.8 3.1*  --  3.8  --    CHLORIDE 101 96* 97*  --  100  --  105  --    CO2 29.3* 27.7 25.8  --  27.9  --  26.2  --    ANION GAP 7.7 11.3 12.2  --  11.1  --  9.8  --    BUN 13 12 16  --  14  --  20  --    CREATININE 0.87 0.93 1.25*  --  1.05*  --  1.09*  --    EGFR 66.2 61.1 42.9*  --  52.8*  --  50.5*  --    GLUCOSE 89 93 109*  --  105*  --  125*  --    CALCIUM 8.9 8.8 8.1*  --  7.9*  --  8.1*  --    MAGNESIUM 2.0 1.9 1.9  --  1.8 1.7  --   --    PHOSPHORUS 3.1 2.7 2.9  --  3.6  --   --   --    HEMOGLOBIN A1C  --   --   --  6.27*  --   --   --   --    TSH  --   --   --   --   --   --   --  5.750*         Lab 07/10/24  0309 07/05/24  0953   TOTAL PROTEIN 6.9 7.4   ALBUMIN 4.1 4.5   GLOBULIN 2.8 2.9   ALT (SGPT) 6 9   AST (SGOT) 32 24   BILIRUBIN 0.6 1.2   ALK PHOS 82 90         Lab 07/10/24  0309 07/05/24  1156 07/05/24  0953   PROBNP  --   --  10,939.0*   HSTROP T  --  17* 18*   PROTIME 11.6  --   --    INR 1.07  --   --          Lab 07/06/24  0358   CHOLESTEROL 107   LDL CHOL 49   HDL CHOL 42   TRIGLYCERIDES 80         Lab 07/06/24  1108   IRON 24*   IRON SATURATION (TSAT) 5*   TIBC 487   TRANSFERRIN 327   FERRITIN 37.00         Brief Urine Lab Results  (Last result in the past 365 days)        Color   Clarity   Blood   Leuk Est   Nitrite   Protein   CREAT   Urine HCG        01/10/24 2246 Yellow   Clear   Negative   Negative   Negative   Negative                 Microbiology Results (last 10 days)       ** No results found for the last 240 hours. **            XR Sacrum & Coccyx    Result Date: 7/5/2024  Impression: Impression: 1. No evidence of acute L-spine fracture or subluxation. Stable degenerative changes in  spondylolisthesis. 2. No evidence of displaced sacrococcygeal fracture. Electronically Signed: Quan Cazares MD  7/5/2024 11:54 AM EDT  Workstation ID: [id number]    XR Spine Lumbar Complete 4+VW    Result Date: 7/5/2024  Impression: Impression: 1. No evidence of acute L-spine fracture or subluxation. Stable degenerative changes in spondylolisthesis. 2. No evidence of displaced sacrococcygeal fracture. Electronically Signed: Quan Cazares MD  7/5/2024 11:54 AM EDT  Workstation ID: [id number]    XR Chest 1 View    Result Date: 7/5/2024  Impression: Impression: Cardiomegaly with pulmonary vascular congestion and mild interstitial edema. No focal consolidation or pleural disease. Electronically Signed: Lee Paris MD  7/5/2024 11:43 AM EDT  Workstation ID: [id number]    XR Shoulder 2+ View Right    Result Date: 7/5/2024  Impression: Impression: Degenerative changes with no definite acute osseous abnormality noted Electronically Signed: Cesar Arzate MD  7/5/2024 11:42 AM EDT  Workstation ID: [id number]     Results for orders placed during the hospital encounter of 03/04/22    Duplex Venous Lower Extremity - Bilateral CAR    Interpretation Summary  · Normal bilateral lower extremity venous duplex scan.  · Nonvascular cystic mass located in the left posterior mid calf.      Results for orders placed during the hospital encounter of 03/04/22    Duplex Venous Lower Extremity - Bilateral CAR    Interpretation Summary  · Normal bilateral lower extremity venous duplex scan.  · Nonvascular cystic mass located in the left posterior mid calf.      Results for orders placed during the hospital encounter of 07/05/24    Adult Transthoracic Echo Complete w/ Color, Spectral and Contrast if necessary per protocol    Interpretation Summary    Left ventricular ejection fraction appears to be 46 - 50%.    Left ventricular wall thickness is consistent with moderate concentric hypertrophy.    The left atrial cavity is severely dilated.    The  right atrial cavity is moderate to severely  dilated.    Abnormal mitral valve structure consistent with dilated annulus.    Moderate to severe mitral valve regurgitation is present with a centrally-directed jet noted.    Moderate to severe tricuspid valve regurgitation is present.    Estimated right ventricular systolic pressure from tricuspid regurgitation is moderately elevated (45-55 mmHg).    Transthoracic echocardiography reveals moderate LVH with overall EF between 45 to 50%  Moderate to severe central mitral regurgitation and moderate to severe TR with moderate pulm hypertension with mild to moderate AI.  Severe left atrial enlargement and moderate right atrial enlargement and no effusion.    Electronically signed by Baljeet Valero MD, 07/06/24, 3:26 PM EDT.      Labs Pending at Discharge:  Pending Labs       Order Current Status    Tissue Pathology Exam In process            Procedures Performed  Procedure(s):  COLONOSCOPY with cold snare polypectomy x1 and endoscopic clipping x2  ESOPHAGOGASTRODUODENOSCOPY  07/10 1242 Colonoscopy  07/10 1242 Upper GI Endoscopy      Consults:   Consults       Date and Time Order Name Status Description    7/9/2024  8:30 AM Inpatient Gastroenterology Consult Completed     7/5/2024  2:35 PM Inpatient Cardiology Consult      7/5/2024  1:03 PM Hospitalist (on-call MD unless specified)                Discharge Details        Discharge Medications        ASK your doctor about these medications        Instructions Start Date   cyclobenzaprine 5 MG tablet  Commonly known as: FLEXERIL   5 mg, Oral, 2 Times Daily PRN      melatonin 3 MG tablet   3 mg, Oral, Nightly      metoprolol succinate XL 25 MG 24 hr tablet  Commonly known as: TOPROL-XL   25 mg, Oral, Daily               Allergies   Allergen Reactions    Baclofen Rash    Codeine Nausea Only    Ibuprofen Unknown (See Comments)     Patient doesn't know----Motin    Methocarbamol Unknown (See Comments)     Patient doesn't know     Naproxen Unknown (See Comments)     Pt. Doesn't know     Tizanidine Hcl Other (See Comments)     Syncope     Tolmetin Dizziness     Pt. Doesn't know-- same as Tolectin         Discharge Disposition: Home health      Diet:  Hospital:  Diet Order   Procedures    Diet: Cardiac; Healthy Heart (2-3 Na+); Fluid Consistency: Thin (IDDSI 0)         Discharge Activity:         CODE STATUS:  Code Status and Medical Interventions:   Ordered at: 07/05/24 1342     Level Of Support Discussed With:    Patient     Code Status (Patient has no pulse and is not breathing):    CPR (Attempt to Resuscitate)     Medical Interventions (Patient has pulse or is breathing):    Full Support         No future appointments.        Time spent on Discharge including face to face service:  >30 minutes    Signature: Electronically signed by Zuleima Canales MD, 07/11/24, 13:01 EDT.  [facility] Hospitalist Team

## 2024-07-11 NOTE — OUTREACH NOTE
Prep Survey      Flowsheet Row Responses   [facility] facility patient discharged from? [facility]   Is LACE score < 7 ? No   Eligibility Readm Mgmt   Discharge diagnosis Atrial fibrillation with RVR   Does the patient have one of the following disease processes/diagnoses(primary or secondary)? Other   Does the patient have Home health ordered? Yes   What is the Home health agency?  [facility]   Is there a DME ordered? No   Prep survey completed? Yes            REILLY CABRERA - Registered Nurse

## 2024-07-11 NOTE — PROGRESS NOTES
CARDIOLOGY PROGRESS NOTE:    Catalina Moreno  84 y.o.  female  1940  [id number]      Referring Provider: Christal Tubbs APRN    Reason for follow-up: afib with RVR      Patient Care Team:  Flash Gonzalez MD as PCP - General (Cardiology)  Flash Gonzalez MD as Consulting Physician (Cardiology)    Subjective  Patient seen and examined. Labs and chart reviewed.  Patient reports improvement in symptoms.  Cardioversion scheduled yesterday canceled due to EGD/colonoscopy.  Will plan for outpatient cardioversion.       Objective  Patient lying in bed resting on room air     Review of Systems   Constitutional: Negative for chills, fever and malaise/fatigue.   Cardiovascular:  Negative for chest pain, dyspnea on exertion, leg swelling, palpitations and syncope.   Respiratory:  Negative for cough and shortness of breath.    Gastrointestinal:  Negative for abdominal pain, nausea and vomiting.   Neurological:  Positive for weakness. Negative for dizziness, focal weakness, headaches, light-headedness and numbness.   All other systems reviewed and are negative.      Allergies: Baclofen, Codeine, Ibuprofen, Methocarbamol, Naproxen, Tizanidine hcl, and Tolmetin    Scheduled Meds:amiodarone, 200 mg, Oral, Q8H   Followed by  [START ON 7/23/2024] amiodarone, 200 mg, Oral, Q12H   Followed by  [START ON 8/6/2024] amiodarone, 200 mg, Oral, Daily  [Held by provider] apixaban, 5 mg, Oral, Q12H  metoprolol succinate XL, 25 mg, Oral, Q24H  pantoprazole, 40 mg, Intravenous, Q AM  pregabalin, 50 mg, Oral, Q8H  vitamin D3, 5,000 Units, Oral, Daily      Continuous Infusions:lactated ringers, 50 mL/hr, Last Rate: 50 mL/hr (07/09/24 0649)        PRN Meds:.  acetaminophen **OR** acetaminophen **OR** acetaminophen    senna-docusate sodium **AND** polyethylene glycol **AND** bisacodyl **AND** bisacodyl    Calcium Replacement - Follow Nurse / BPA Driven Protocol    Magnesium Standard Dose Replacement - Follow Nurse / BPA Driven Protocol     "melatonin    nitroglycerin    ondansetron ODT **OR** ondansetron    Phosphorus Replacement - Follow Nurse / BPA Driven Protocol    Potassium Replacement - Follow Nurse / BPA Driven Protocol    sodium chloride    [COMPLETED] Insert Peripheral IV **AND** sodium chloride        VITAL SIGNS  Vitals:    07/10/24 1850 07/10/24 2305 07/11/24 0355 07/11/24 0804   BP: 105/48 121/66 124/64 (!) 111/39   BP Location: Right arm Right arm Right arm Right arm   Patient Position: Lying Lying Lying Lying   Pulse: 86 70 84 96   Resp: (!) 31 9 12 23   Temp: 97.4 °F (36.3 °C) 97.3 °F (36.3 °C) 98.3 °F (36.8 °C) 98 °F (36.7 °C)   TempSrc: Oral Oral Oral Oral   SpO2: 97% 95% 99%    Weight:       Height:           Flowsheet Rows      Flowsheet Row First Filed Value   Admission Height 160 cm (63\") Documented at 07/05/2024 0950   Admission Weight 74.8 kg (165 lb) Documented at 07/05/2024 0950             TELEMETRY: atrial fibrillation with controlled ventricular response     Physical Exam:  Vitals reviewed.   Constitutional:       Appearance: Overweight and not in distress.   Eyes:      Pupils: Pupils are equal, round, and reactive to light.   HENT:    Mouth/Throat:      Pharynx: Oropharynx is clear.   Pulmonary:      Effort: Pulmonary effort is normal.      Breath sounds: Normal breath sounds.   Cardiovascular:      Normal rate. Irregularly irregular rhythm.   Pulses:     Intact distal pulses.   Abdominal:      Palpations: Abdomen is soft.   Musculoskeletal: Normal range of motion.      Cervical back: Normal range of motion and neck supple. Skin:     General: Skin is warm and dry.   Neurological:      General: No focal deficit present.      Mental Status: Alert.       Results Review:   I reviewed the patient's new clinical results.  Lab Results (last 24 hours)       Procedure Component Value Units Date/Time    Tissue Pathology Exam [[id number]] Collected: 07/10/24 1307    Specimen: Polyp from Large Intestine, Sigmoid Colon Updated: 07/11/24 " 0714    Magnesium [[id number]]  (Normal) Collected: 07/11/24 0042    Specimen: Blood from Arm, Left Updated: 07/11/24 0114     Magnesium 2.0 mg/dL     Phosphorus [[id number]]  (Normal) Collected: 07/11/24 0042    Specimen: Blood from Arm, Left Updated: 07/11/24 0114     Phosphorus 3.1 mg/dL     Basic Metabolic Panel [[id number]]  (Abnormal) Collected: 07/11/24 0042    Specimen: Blood from Arm, Left Updated: 07/11/24 0114     Glucose 89 mg/dL      BUN 13 mg/dL      Creatinine 0.87 mg/dL      Sodium 138 mmol/L      Potassium 4.4 mmol/L      Chloride 101 mmol/L      CO2 29.3 mmol/L      Calcium 8.9 mg/dL      BUN/Creatinine Ratio 14.9     Anion Gap 7.7 mmol/L      eGFR 66.2 mL/min/1.73     Narrative:      GFR Normal >60  Chronic Kidney Disease <60  Kidney Failure <15    The GFR formula is only valid for adults with stable renal function between ages 18 and 70.    CBC & Differential [[id number]]  (Abnormal) Collected: 07/11/24 0042    Specimen: Blood from Arm, Left Updated: 07/11/24 0053    Narrative:      The following orders were created for panel order CBC & Differential.  Procedure                               Abnormality         Status                     ---------                               -----------         ------                     CBC Auto Differential[[id number]]        Abnormal            Final result                 Please view results for these tests on the individual orders.    CBC Auto Differential [[id number]]  (Abnormal) Collected: 07/11/24 0042    Specimen: Blood from Arm, Left Updated: 07/11/24 0053     WBC 6.76 10*3/mm3      RBC 3.73 10*6/mm3      Hemoglobin 9.2 g/dL      Hematocrit 30.6 %      MCV 82.0 fL      MCH 24.7 pg      MCHC 30.1 g/dL      RDW 19.8 %      RDW-SD 53.2 fl      MPV 10.2 fL      Platelets 258 10*3/mm3      Neutrophil % 69.0 %      Lymphocyte % 21.3 %      Monocyte % 6.4 %      Eosinophil % 2.4 %      Basophil % 0.6 %      Immature Grans % 0.3 %      Neutrophils, Absolute 4.67  10*3/mm3      Lymphocytes, Absolute 1.44 10*3/mm3      Monocytes, Absolute 0.43 10*3/mm3      Eosinophils, Absolute 0.16 10*3/mm3      Basophils, Absolute 0.04 10*3/mm3      Immature Grans, Absolute 0.02 10*3/mm3      nRBC 0.0 /100 WBC             Imaging Results (Last 24 Hours)       ** No results found for the last 24 hours. **            EKG        I personally viewed and interpreted the patient's EKG/Telemetry data:    ECHOCARDIOGRAM:  Results for orders placed during the hospital encounter of 07/05/24    Adult Transthoracic Echo Complete w/ Color, Spectral and Contrast if necessary per protocol    Interpretation Summary    Left ventricular ejection fraction appears to be 46 - 50%.    Left ventricular wall thickness is consistent with moderate concentric hypertrophy.    The left atrial cavity is severely dilated.    The right atrial cavity is moderate to severely  dilated.    Abnormal mitral valve structure consistent with dilated annulus.    Moderate to severe mitral valve regurgitation is present with a centrally-directed jet noted.    Moderate to severe tricuspid valve regurgitation is present.    Estimated right ventricular systolic pressure from tricuspid regurgitation is moderately elevated (45-55 mmHg).    Transthoracic echocardiography reveals moderate LVH with overall EF between 45 to 50%  Moderate to severe central mitral regurgitation and moderate to severe TR with moderate pulm hypertension with mild to moderate AI.  Severe left atrial enlargement and moderate right atrial enlargement and no effusion.    Electronically signed by Baljeet Valero MD, 07/06/24, 3:26 PM EDT.       STRESS MYOVIEW:  Results for orders placed during the hospital encounter of 03/31/21    Stress Test With Myocardial Perfusion One Day    Interpretation Summary  · Findings consistent with a normal ECG stress test.  · Left ventricular ejection fraction is hyperdynamic (Calculated EF > 70%). .  · Impressions are consistent  with a study indicating uncertain risk.  · Large apical defect with some reperfusion during the rest images cannot rule out ischemia EF 76% Clinical correlation is recommended.    Electronically signed by KALYANI Tay, 04/01/21, 11:25 AM EDT.       CARDIAC CATHETERIZATION:  Results for orders placed during the hospital encounter of 03/31/21    Cardiac Catheterization/Vascular Study    Narrative  CARDIAC CATHETERIZATION REPORT    DATE OF PROCEDURE: 4/2/2021      INDICATION FOR PROCEDURE: Abnormal stress test    PROCEDURE PERFORMED: Left heart catheterization, coronary angiography,    PROCEDURE COMMENTS:    All the risks and benefits explained with the patient informed consent was obtained from the patient.  Patient was brought to the cardiac catheterization laboratory placed on the table draped and prepped in the usual sterile fashion.  2% lidocaine was used to anesthetize the right groin area.  Using the modified Seldinger technique 5 [place] sheath was inserted into the right femoral artery.    5 [place] JL4 catheter was used to perform the selective left coronary angiography    5 [place] JR4 catheter was used to perform the selective right coronary angiography    Angio-Seal was placed after exchanging five [place] sheath with six [place] sheath    Patient tolerated procedure well without any immediate complications      FINDINGS:    1. HEMODYNAMICS:  LV end-diastolic pressure 7 mmHg, /80 mmHg.    2. LEFT VENTRICULOGRAPHY: Not done patient had echocardiogram    3. CORONARY ANGIOGRAPHY:  Dominance right  Left Main: Large-caliber vessel bifurcates into LAD circumflex artery 0% stenosis  LAD: Large-caliber vessel gives rise to couple of medium caliber diagonal arteries multiple septal branches reaches the apex mild luminal irregularities noted less than 10% stenosis  CIRC: Large-caliber vessel gives rise to marginal lateral branches less than 10% stenosis  RCA: Large-caliber dominant artery gives rise to  PDA and PL branches less than 5 to 10% stenosis    SUMMARY: No obstructive coronary artery disease    RECOMMENDATIONS: Evaluate for noncardiac causes of symptoms aggressive cardiac risk factor modification       OTHER:         Assessment & Plan     Afib with RVR  Sick sinus syndrome  s/p dual chamber (1/2023)   Recurrent AF   TSH elevated, T4 normal   Eliquis for anticoagulation   Cardioversion canceled due to GI workup  Symptoms improving, heart rate well-controlled  Will continue beta-blocker and amiodarone and plan for outpatient cardioversion if needed  Follow in office in 3 weeks with EKG and schedule CV if needed at that time    HFmEF  Valvular disease  Echocardiogram with LVEF 46-50% moderate to severe MR and TR  proBNP >10,000  Beta blocker  IV diuresis completed    Hypertension   Blood pressure well controlled  Continue beta blocker     CAD  Cardiac catheterization form 2021 with minimal disease   Denies chest pain   Troponin mildly elevated likely secondary to afib RVR  Lipid panel with total 107, HDL 42, LDL 49     Anemia  Occult stool positive  H&H improved  Treated with IV ferrlecit   GI following  S/p EGD/colonoscopy yesterday  Restart anticoagulation when cleared with GI    Patient is okay to discharge from cardiology standpoint, will follow-up patient in 3 weeks     KALYANI Greene  07/11/24  10:36 EDT

## 2024-07-12 LAB
LAB AP CASE REPORT: NORMAL
PATH REPORT.FINAL DX SPEC: NORMAL
PATH REPORT.GROSS SPEC: NORMAL

## 2024-07-15 ENCOUNTER — READMISSION MANAGEMENT (OUTPATIENT)
Dept: CALL CENTER | Facility: HOSPITAL | Age: 84
End: 2024-07-15
Payer: MEDICARE

## 2024-07-15 ENCOUNTER — TELEPHONE (OUTPATIENT)
Dept: CARDIOLOGY | Facility: CLINIC | Age: 84
End: 2024-07-15
Payer: MEDICARE

## 2024-07-15 NOTE — TELEPHONE ENCOUNTER
Patient has a [employer] device. Spoke to Jazz (caregiver) gave her instructions to push center heart button and have patient sit in front of monitor until lights quit flashing. Should take 3 to 5 minutes. Will watch for report and will call back with update.

## 2024-07-15 NOTE — TELEPHONE ENCOUNTER
Spoke to Jazz, getting yellow flashing on her monitor, advised to push reset button, than unplug. Still flashing. Gave [phone] for assistance.

## 2024-07-15 NOTE — TELEPHONE ENCOUNTER
Caller: Catalina Moreno    Relationship: Self    Best call back number: PT GAVE VERBAL PERMISSION TO CALL SAMUEL MOTLEY( CARETAKER)  [phone]    What is the best time to reach you: ANYTIME    Who are you requesting to speak with (clinical staff, provider,  specific staff member): CLINICAL      What was the call regarding: CARETAKER FOUND MONITOR IN BEDROOM UNPLUGGED... SHE PLUGGED IT BACK IN AND LIGHT IS GREEN.  DOES SHE NEED TO DO ANYTHING ELSE?   PLEASE CALL TO ADVISE

## 2024-07-15 NOTE — OUTREACH NOTE
Medical Week 1 Survey      Flowsheet Row Responses   [facility] patient discharged from? [facility]   Does the patient have one of the following disease processes/diagnoses(primary or secondary)? Other   Week 1 attempt successful? Yes   Call start time 0900   Call end time 0908   List who call center can speak with pt   Meds reviewed with patient/caregiver? Yes   Does the patient have all medications ordered at discharge? Yes   Is the patient taking all medications as directed (includes completed medication regime)? Yes   Comments regarding appointments Pt to see pcp on 7-.   Does the patient have a primary care provider?  Yes   Has the patient kept scheduled appointments due by today? N/A   Has home health visited the patient within 72 hours of discharge? Unsure   Psychosocial issues? No   Did the patient receive a copy of their discharge instructions? Yes   What is the patient's perception of their health status since discharge? Improving   Is the patient/caregiver able to teach back signs and symptoms related to disease process for when to call PCP? Yes   Is the patient/caregiver able to teach back signs and symptoms related to disease process for when to call 911? Yes   Is the patient/caregiver able to teach back the hierarchy of who to call/visit for symptoms/problems? PCP, Specialist, Home health nurse, Urgent Care, ED, 911 Yes   Week 1 call completed? Yes   Would this patient benefit from a Referral to Amb Social Work? No   Is the patient interested in additional calls from an ambulatory ? No   Wrap up additional comments Pt reports feeling tired. Pt does live alone. Pt has a friend who organizes medications and takes pt to appointments. Pt to pcp on 7-.   Call end time 0908            Maia ADAMS - Registered Nurse

## 2024-07-16 LAB
QT INTERVAL: 401 MS
QTC INTERVAL: 504 MS

## 2024-07-17 ENCOUNTER — OFFICE VISIT (OUTPATIENT)
Dept: FAMILY MEDICINE CLINIC | Facility: CLINIC | Age: 84
End: 2024-07-17
Payer: MEDICARE

## 2024-07-17 VITALS
HEART RATE: 106 BPM | SYSTOLIC BLOOD PRESSURE: 155 MMHG | BODY MASS INDEX: 30.09 KG/M2 | DIASTOLIC BLOOD PRESSURE: 80 MMHG | WEIGHT: 169.8 LBS | TEMPERATURE: 98.4 F | HEIGHT: 63 IN | RESPIRATION RATE: 18 BRPM | OXYGEN SATURATION: 96 %

## 2024-07-17 DIAGNOSIS — D50.8 OTHER IRON DEFICIENCY ANEMIA: ICD-10-CM

## 2024-07-17 DIAGNOSIS — R26.89 BALANCE PROBLEM: ICD-10-CM

## 2024-07-17 DIAGNOSIS — R26.0 ATAXIC GAIT: ICD-10-CM

## 2024-07-17 DIAGNOSIS — M54.42 CHRONIC BILATERAL LOW BACK PAIN WITH LEFT-SIDED SCIATICA: Chronic | ICD-10-CM

## 2024-07-17 DIAGNOSIS — R41.81 AGE-RELATED COGNITIVE DECLINE: Chronic | ICD-10-CM

## 2024-07-17 DIAGNOSIS — I48.91 ATRIAL FIBRILLATION WITH RVR: ICD-10-CM

## 2024-07-17 DIAGNOSIS — G89.29 CHRONIC BILATERAL LOW BACK PAIN WITH LEFT-SIDED SCIATICA: Chronic | ICD-10-CM

## 2024-07-17 DIAGNOSIS — R25.2 LEG CRAMPS: ICD-10-CM

## 2024-07-17 DIAGNOSIS — Z91.81 AT RISK FOR FALLS: ICD-10-CM

## 2024-07-17 DIAGNOSIS — I10 ESSENTIAL (PRIMARY) HYPERTENSION: Chronic | ICD-10-CM

## 2024-07-17 DIAGNOSIS — I50.32 CHRONIC HEART FAILURE WITH PRESERVED EJECTION FRACTION (HFPEF): Primary | ICD-10-CM

## 2024-07-17 DIAGNOSIS — Z95.0 PRESENCE OF CARDIAC PACEMAKER: Chronic | ICD-10-CM

## 2024-07-17 DIAGNOSIS — G57.93 NEUROPATHY OF BOTH FEET: ICD-10-CM

## 2024-07-17 LAB
DEPRECATED RDW RBC AUTO: 60.5 FL (ref 37–54)
ERYTHROCYTE [DISTWIDTH] IN BLOOD BY AUTOMATED COUNT: 20.7 % (ref 12.3–15.4)
HCT VFR BLD AUTO: 30.3 % (ref 34–46.6)
HGB BLD-MCNC: 9.3 G/DL (ref 12–15.9)
MCH RBC QN AUTO: 25.5 PG (ref 26.6–33)
MCHC RBC AUTO-ENTMCNC: 30.7 G/DL (ref 31.5–35.7)
MCV RBC AUTO: 83.2 FL (ref 79–97)
PLATELET # BLD AUTO: 229 10*3/MM3 (ref 140–450)
PMV BLD AUTO: 10.2 FL (ref 6–12)
RBC # BLD AUTO: 3.64 10*6/MM3 (ref 3.77–5.28)
WBC NRBC COR # BLD AUTO: 5.77 10*3/MM3 (ref 3.4–10.8)

## 2024-07-17 PROCEDURE — 36415 COLL VENOUS BLD VENIPUNCTURE: CPT | Performed by: NURSE PRACTITIONER

## 2024-07-17 PROCEDURE — 83880 ASSAY OF NATRIURETIC PEPTIDE: CPT | Performed by: NURSE PRACTITIONER

## 2024-07-17 PROCEDURE — 85027 COMPLETE CBC AUTOMATED: CPT | Performed by: NURSE PRACTITIONER

## 2024-07-17 PROCEDURE — 80053 COMPREHEN METABOLIC PANEL: CPT | Performed by: NURSE PRACTITIONER

## 2024-07-17 PROCEDURE — 3079F DIAST BP 80-89 MM HG: CPT | Performed by: NURSE PRACTITIONER

## 2024-07-17 PROCEDURE — 1125F AMNT PAIN NOTED PAIN PRSNT: CPT | Performed by: NURSE PRACTITIONER

## 2024-07-17 PROCEDURE — 99495 TRANSJ CARE MGMT MOD F2F 14D: CPT | Performed by: NURSE PRACTITIONER

## 2024-07-17 PROCEDURE — 1159F MED LIST DOCD IN RCRD: CPT | Performed by: NURSE PRACTITIONER

## 2024-07-17 PROCEDURE — 83735 ASSAY OF MAGNESIUM: CPT | Performed by: NURSE PRACTITIONER

## 2024-07-17 PROCEDURE — 1111F DSCHRG MED/CURRENT MED MERGE: CPT | Performed by: NURSE PRACTITIONER

## 2024-07-17 PROCEDURE — 3077F SYST BP >= 140 MM HG: CPT | Performed by: NURSE PRACTITIONER

## 2024-07-17 PROCEDURE — 1160F RVW MEDS BY RX/DR IN RCRD: CPT | Performed by: NURSE PRACTITIONER

## 2024-07-17 RX ORDER — METOPROLOL SUCCINATE 50 MG/1
50 TABLET, EXTENDED RELEASE ORAL DAILY
Qty: 90 TABLET | Refills: 1 | Status: SHIPPED | OUTPATIENT
Start: 2024-07-17

## 2024-07-17 RX ORDER — GABAPENTIN 100 MG/1
100 CAPSULE ORAL NIGHTLY
Qty: 90 CAPSULE | Refills: 0 | Status: SHIPPED | OUTPATIENT
Start: 2024-07-17

## 2024-07-17 RX ORDER — SPIRONOLACTONE 25 MG/1
25 TABLET ORAL DAILY
Qty: 90 TABLET | Refills: 1 | Status: SHIPPED | OUTPATIENT
Start: 2024-07-17

## 2024-07-17 NOTE — PROGRESS NOTES
Transitional Care Follow Up Visit  Subjective     Chief Complaint   Patient presents with    [facility]    Hospital Follow Up Visit         Catalina Moreno is a 84 y.o. female who presents for a transitional care management visit.    Within 48 business hours after discharge our office contacted her via telephone to coordinate her care and needs.      I reviewed and discussed the details of that call along with the discharge summary, hospital problems, inpatient lab results, inpatient diagnostic studies, and consultation reports with Catalina.     Current outpatient and discharge medications have been reconciled for the patient.  Reviewed by: Liv Duron, KALYANI          7/15/24   Date of TCM Phone Call   Hospital [facility]   Date of Admission 7/5/24   Date of Discharge 7/11/24   Discharge Disposition Home-Health Care [facility] [place]     Risk for Readmission (LACE) Score: 15 (7/11/2024  6:00 AM)      History of Present Illness   Course During Hospital Stay:        New 84-year-old female patient with past medical history of SSS, CHF, atrial fibrillation, hypothyroidism, GERD  presents to [facility] with new provider and for hospital follow-up.  Patient apparently lives alone and has been falling. She has had multiple hospitalizations in the last year.  She does not have any family members local to help her and a neighbor has stepped in to assist     Patient presented to [facility] on 7/5/2024 with shortness of breath that developed over several weeks. She was found to be in A-fib with RVR, started on Cardizem drip and cardiology was consulted    TTE 7/6/2024 showed LVEF 46 to 50% with severe dilation of right and left atrial cavity   BNP 10,939, she was diuresed  CXR shows cardiomegaly with pulmonary vascular congestion   TSH 5.75  H&H 9.2/30.6 on discharge  ARIELA resolved  Hgba1c 6.27    She underwent colonoscopy on 7/10/2024,  Showed gastritis and 2 polyps removed    Symptoms stabilized, she was seen  by and recommended by PT/OT for skilled nursing placement, patient adamantly refused as she cannot leave her dog at home for extended period of time. She was stable for discharge, and recommended follow-up with cardiology outpatient for cardioversion.  She continues Eliquis, was started on amiodarone and Toprol,  Ferrous gluconate for anemia    She has chronic low back pain with L sided sciatic pain, pain in both feet, has an appointment upcoming with Dr. Worthy, she took gabapentin in the past for pain    Her last cardiology appointment was with Dr. Fountain 2/15/23. She is now seeing Dr. Gonzalez, has appt on 8/13/24         The following portions of the patient's history were reviewed and updated as appropriate: allergies, current medications, past family history, past medical history, past social history, past surgical history, and problem list.    Past Medical History:   Diagnosis Date    Acquired spondylolisthesis of lumbosacral region     Acute kidney injury 07/22/2022    Anxiety     Arthritis     Atrial fibrillation     paroxysmal    Broken shoulder     left-- due to fall 11-7-19 was at [facility]    Chronic pain disorder     Chronic systolic CHF (congestive heart failure) 01/05/2023    EF 36-40%    Coronary artery disease involving native coronary artery of native heart without angina pectoris 04/02/2021    nonobstructive    DDD (degenerative disc disease), cervical     DDD (degenerative disc disease), lumbar     Depression     Edema     9/2020 foot    GERD (gastroesophageal reflux disease)     H/O fall     Heart failure with mid-range ejection fraction 03/05/2022    EF 45% per 2D echo    Hypertension     Hypothyroidism     Insomnia     Low back pain     Moderate mitral regurgitation 01/05/2023    Neuropathy     Nonrheumatic tricuspid valve regurgitation     Pain in both feet     Polypharmacy     PONV (postoperative nausea and vomiting)     Pulmonary hypertension     Radiculopathy     Restless legs      "Sacroiliitis     Sick sinus syndrome     Added automatically from request for surgery [id number]    Spondylolisthesis     Stage 3a chronic kidney disease     Urinary incontinence     Vitamin D deficiency      Past Surgical History:   Procedure Laterality Date    BACK SURGERY  07/19/2018    PDC &  PSF L3-L5 insitu    CARDIAC CATHETERIZATION N/A 4/2/2021    Procedure: Cardiac Catheterization/Vascular Study;  Surgeon: Barrett Evans MD;  Location: [facility] CATH INVASIVE LOCATION;  Service: Cardiovascular;  Laterality: N/A;    CARDIAC ELECTROPHYSIOLOGY PROCEDURE N/A 1/17/2023    Procedure: Pacemaker DC new;  Surgeon: Baljeet Valero MD;  Location: [facility] CATH INVASIVE LOCATION;  Service: Cardiovascular;  Laterality: N/A;    CHOLECYSTECTOMY      COLONOSCOPY      COLONOSCOPY N/A 7/10/2024    Procedure: COLONOSCOPY with cold snare polypectomy x1 and endoscopic clipping x2;  Surgeon: MARLIN Vanegas MD;  Location: [facility] ENDOSCOPY;  Service: Gastroenterology;  Laterality: N/A;  Post- colon polyps, diverticulosis    ENDOSCOPY N/A 9/14/2023    Procedure: ESOPHAGOGASTRODUODENOSCOPY;  Surgeon: Ulysses Lamas MD;  Location: [facility] ENDOSCOPY;  Service: Gastroenterology;  Laterality: N/A;  gastritis, inflammatory gastric polyp    ENDOSCOPY N/A 7/10/2024    Procedure: ESOPHAGOGASTRODUODENOSCOPY;  Surgeon: MARLIN Vanegas MD;  Location: [facility] ENDOSCOPY;  Service: Gastroenterology;  Laterality: N/A;  Post- hiatal hernia, gastric polyps, gastritis    HYSTERECTOMY      ROTATOR CUFF REPAIR Left            Objective   /80 (BP Location: Left arm, Patient Position: Sitting, Cuff Size: Adult)   Pulse 106   Temp 98.4 °F (36.9 °C)   Resp 18   Ht 160 cm (63\")   Wt 77 kg (169 lb 12.8 oz)   SpO2 96%   BMI 30.08 kg/m²     Physical Exam  Constitutional:       General: She is not in acute distress.     Appearance: Normal appearance. She is well-developed. She is ill-appearing (chronic). She is not diaphoretic. "   HENT:      Head: Normocephalic.   Eyes:      Conjunctiva/sclera: Conjunctivae normal.      Pupils: Pupils are equal, round, and reactive to light.   Neck:      Thyroid: No thyromegaly.      Vascular: No JVD.   Cardiovascular:      Rate and Rhythm: Normal rate and regular rhythm.      Heart sounds: Normal heart sounds. No murmur heard.  Pulmonary:      Effort: Pulmonary effort is normal. No respiratory distress.      Breath sounds: Normal breath sounds. No wheezing or rhonchi.   Abdominal:      General: Bowel sounds are normal. There is no distension.      Palpations: Abdomen is soft.      Tenderness: There is no abdominal tenderness.   Musculoskeletal:         General: Swelling (+1 pitting BLE) present. No tenderness. Normal range of motion.      Cervical back: Normal range of motion and neck supple. No tenderness.   Lymphadenopathy:      Cervical: No cervical adenopathy.   Skin:     General: Skin is warm and dry.      Coloration: Skin is pale. Skin is not jaundiced.      Findings: No erythema or rash.   Neurological:      General: No focal deficit present.      Mental Status: She is alert and oriented to person, place, and time. Mental status is at baseline.      Sensory: No sensory deficit.      Motor: Weakness present.      Gait: Gait abnormal (Uses walker for mobility).   Psychiatric:         Mood and Affect: Mood normal.         Behavior: Behavior normal.         Thought Content: Thought content normal.         Judgment: Judgment normal.         Assessment & Plan   Diagnoses and all orders for this visit:    1. Chronic heart failure with preserved ejection fraction (HFpEF) (Primary)  -     Comprehensive Metabolic Panel  -     BNP  -     CBC (No Diff)  -     Magnesium  -     spironolactone (Aldactone) 25 MG tablet; Take 1 tablet by mouth Daily. For swelling/CHF  Dispense: 90 tablet; Refill: 1  -     Ambulatory Referral to Home Health  -     Overnight Sleep Oximetry Study; Future    2. Presence of cardiac  pacemaker    3. Essential (primary) hypertension  -     Comprehensive Metabolic Panel  -     BNP  -     CBC (No Diff)  -     Magnesium  -     metoprolol succinate XL (TOPROL-XL) 50 MG 24 hr tablet; Take 1 tablet by mouth Daily. For Afib/Hypertension  Dispense: 90 tablet; Refill: 1  -     Ambulatory Referral to Home Health  -     Overnight Sleep Oximetry Study; Future    4. Atrial fibrillation with RVR  -     Comprehensive Metabolic Panel  -     BNP  -     CBC (No Diff)  -     Magnesium  -     metoprolol succinate XL (TOPROL-XL) 50 MG 24 hr tablet; Take 1 tablet by mouth Daily. For Afib/Hypertension  Dispense: 90 tablet; Refill: 1  -     Ambulatory Referral to Home Health  -     Overnight Sleep Oximetry Study; Future    5. Neuropathy of both feet  -     gabapentin (NEURONTIN) 100 MG capsule; Take 1 capsule by mouth Every Night. For pain  Dispense: 90 capsule; Refill: 0  -     Ambulatory Referral to Home Health  -     Overnight Sleep Oximetry Study; Future    6. Ataxic gait  -     Ambulatory Referral to Home Health  -     Overnight Sleep Oximetry Study; Future    7. Balance problem  -     Ambulatory Referral to Home Health  -     Overnight Sleep Oximetry Study; Future    8. At risk for falls  -     Ambulatory Referral to Home Health  -     Overnight Sleep Oximetry Study; Future    9. Other iron deficiency anemia    10. Leg cramps  -     Ambulatory Referral to Home Health  -     Overnight Sleep Oximetry Study; Future    11. Age-related cognitive decline  -     Ambulatory Referral to Home Health  -     Overnight Sleep Oximetry Study; Future    12. Chronic bilateral low back pain with left-sided sciatica  -     gabapentin (NEURONTIN) 100 MG capsule; Take 1 capsule by mouth Every Night. For pain  Dispense: 90 capsule; Refill: 0  -     Ambulatory Referral to Home Health  -     Overnight Sleep Oximetry Study; Future    Labs today as ordered, will notify results  Recommend magnesium nightly for leg cramps  Add  spironolactone  Continue current medication regimen but increase metoprolol to 50 mg daily  Add gabapentin for pain  Follow-up with cardiology as directed  Home health PT/OT/SN to eval and treat  Highly recommend patient look into assisted living facilities as she has no local family members, she lives alone, frequently falls, a neighbor frequently helps her.   Recommend lifespan resources for possible home health aide and/or assistance with meals  Check overnight oximetry    Return in about 2 weeks (around 7/31/2024) for Recheck.    EMR Dragon transcription disclaimer:  Part of this note may be an electronic transcription/translation of spoken language to printed text using the Dragon Dictation System.          I spent 50 total minutes, face-to-face, caring for Catalina today. 70% of this time involved counseling and/or coordination of care as documented within this note.

## 2024-07-18 ENCOUNTER — TELEPHONE (OUTPATIENT)
Dept: FAMILY MEDICINE CLINIC | Facility: CLINIC | Age: 84
End: 2024-07-18

## 2024-07-18 LAB
ALBUMIN SERPL-MCNC: 4.3 G/DL (ref 3.5–5.2)
ALBUMIN/GLOB SERPL: 1.4 G/DL
ALP SERPL-CCNC: 74 U/L (ref 39–117)
ALT SERPL W P-5'-P-CCNC: 7 U/L (ref 1–33)
ANION GAP SERPL CALCULATED.3IONS-SCNC: 13 MMOL/L (ref 5–15)
AST SERPL-CCNC: 16 U/L (ref 1–32)
BILIRUB SERPL-MCNC: 0.6 MG/DL (ref 0–1.2)
BUN SERPL-MCNC: 23 MG/DL (ref 8–23)
BUN/CREAT SERPL: 22.8 (ref 7–25)
CALCIUM SPEC-SCNC: 9.5 MG/DL (ref 8.6–10.5)
CHLORIDE SERPL-SCNC: 103 MMOL/L (ref 98–107)
CO2 SERPL-SCNC: 24 MMOL/L (ref 22–29)
CREAT SERPL-MCNC: 1.01 MG/DL (ref 0.57–1)
EGFRCR SERPLBLD CKD-EPI 2021: 55.3 ML/MIN/1.73
GLOBULIN UR ELPH-MCNC: 3 GM/DL
GLUCOSE SERPL-MCNC: 89 MG/DL (ref 65–99)
MAGNESIUM SERPL-MCNC: 2.3 MG/DL (ref 1.6–2.4)
NT-PROBNP SERPL-MCNC: 3898 PG/ML (ref 0–1800)
POTASSIUM SERPL-SCNC: 4.5 MMOL/L (ref 3.5–5.2)
PROT SERPL-MCNC: 7.3 G/DL (ref 6–8.5)
SODIUM SERPL-SCNC: 140 MMOL/L (ref 136–145)

## 2024-07-18 RX ORDER — PANTOPRAZOLE SODIUM 40 MG/1
40 TABLET, DELAYED RELEASE ORAL DAILY
Qty: 90 TABLET | Refills: 0 | Status: SHIPPED | OUTPATIENT
Start: 2024-07-18

## 2024-07-18 NOTE — TELEPHONE ENCOUNTER
Caller: [facility]    Relationship:     Best call back number: [phone]     What is the best time to reach you: ANY AND IT IS OKAY TO LEAVE VOICEMAIL    Who are you requesting to speak with (clinical staff, provider,  specific staff member): BENJAMIN NELSON    Do you know the name of the person who called: JO ANN    What was the call regarding: JO ANN FROM [facility] STARTED WORKING WITH PATIENT TODAY, AND   HEART RATE  -130, AND PHARMACY STATED THAT THEY WILL HAVE THE metoprolol succinate XL (TOPROL-XL) 50 MG 24 hr tablet BY THE END OF THE DAY TODAY.  PATIENT IS NOT SYMPTOMATIC.   JO ANN JUST WANTED TO MAKE YOU AWARE, AND SEE WHAT YOU ADVISE?

## 2024-07-18 NOTE — TELEPHONE ENCOUNTER
Another [facility] nurse called with concern for patient. /110,  hr 127 and oxygen 98%. No symptoms of lightheadness or dizzy. Ambulating ok.

## 2024-07-22 ENCOUNTER — TELEPHONE (OUTPATIENT)
Dept: FAMILY MEDICINE CLINIC | Facility: CLINIC | Age: 84
End: 2024-07-22
Payer: MEDICARE

## 2024-07-22 NOTE — TELEPHONE ENCOUNTER
[facility] called, pt. Is extremly tired, pulse 103, wondered if the new gabapentin could do this, she is not normally this way.. 124/78 BP, no temp..  just not feeling well.

## 2024-07-23 ENCOUNTER — TELEPHONE (OUTPATIENT)
Dept: FAMILY MEDICINE CLINIC | Facility: CLINIC | Age: 84
End: 2024-07-23
Payer: MEDICARE

## 2024-07-23 NOTE — TELEPHONE ENCOUNTER
Lyla Garcia called to inform of elevated heart rate.  and reported some dizziness. Informed both Lyla and Corky Arnold of message from Liv that was provided yesterday.Patient will dc gabapentin until re-evaulated with Liv.    Lyla: [phone]

## 2024-07-24 ENCOUNTER — APPOINTMENT (OUTPATIENT)
Dept: GENERAL RADIOLOGY | Facility: HOSPITAL | Age: 84
DRG: 309 | End: 2024-07-24
Payer: MEDICARE

## 2024-07-24 ENCOUNTER — HOSPITAL ENCOUNTER (INPATIENT)
Facility: HOSPITAL | Age: 84
LOS: 2 days | Discharge: HOME HEALTH | DRG: 309 | End: 2024-07-26
Attending: INTERNAL MEDICINE | Admitting: INTERNAL MEDICINE
Payer: MEDICARE

## 2024-07-24 ENCOUNTER — APPOINTMENT (OUTPATIENT)
Dept: CT IMAGING | Facility: HOSPITAL | Age: 84
DRG: 309 | End: 2024-07-24
Payer: MEDICARE

## 2024-07-24 DIAGNOSIS — R53.1 GENERALIZED WEAKNESS: ICD-10-CM

## 2024-07-24 DIAGNOSIS — I48.91 ATRIAL FIBRILLATION WITH RVR: Primary | ICD-10-CM

## 2024-07-24 LAB
ALBUMIN SERPL-MCNC: 4.3 G/DL (ref 3.5–5.2)
ALBUMIN/GLOB SERPL: 1.3 G/DL
ALP SERPL-CCNC: 84 U/L (ref 39–117)
ALT SERPL W P-5'-P-CCNC: <5 U/L (ref 1–33)
ANION GAP SERPL CALCULATED.3IONS-SCNC: 10.1 MMOL/L (ref 5–15)
APTT PPP: 24.2 SECONDS (ref 61–76.5)
AST SERPL-CCNC: 27 U/L (ref 1–32)
BACTERIA UR QL AUTO: NORMAL /HPF
BASOPHILS # BLD AUTO: 0.07 10*3/MM3 (ref 0–0.2)
BASOPHILS NFR BLD AUTO: 1 % (ref 0–1.5)
BILIRUB SERPL-MCNC: 0.7 MG/DL (ref 0–1.2)
BILIRUB UR QL STRIP: NEGATIVE
BUN SERPL-MCNC: 23 MG/DL (ref 8–23)
BUN/CREAT SERPL: 25.8 (ref 7–25)
CALCIUM SPEC-SCNC: 9.6 MG/DL (ref 8.6–10.5)
CHLORIDE SERPL-SCNC: 100 MMOL/L (ref 98–107)
CK SERPL-CCNC: 33 U/L (ref 20–180)
CLARITY UR: CLEAR
CO2 SERPL-SCNC: 26.9 MMOL/L (ref 22–29)
COLOR UR: YELLOW
CREAT SERPL-MCNC: 0.89 MG/DL (ref 0.57–1)
DEPRECATED RDW RBC AUTO: 73.2 FL (ref 37–54)
EGFRCR SERPLBLD CKD-EPI 2021: 64.4 ML/MIN/1.73
EOSINOPHIL # BLD AUTO: 0.18 10*3/MM3 (ref 0–0.4)
EOSINOPHIL NFR BLD AUTO: 2.5 % (ref 0.3–6.2)
ERYTHROCYTE [DISTWIDTH] IN BLOOD BY AUTOMATED COUNT: 24.9 % (ref 12.3–15.4)
GLOBULIN UR ELPH-MCNC: 3.2 GM/DL
GLUCOSE SERPL-MCNC: 128 MG/DL (ref 65–99)
GLUCOSE UR STRIP-MCNC: NEGATIVE MG/DL
HCT VFR BLD AUTO: 38.2 % (ref 34–46.6)
HGB BLD-MCNC: 12 G/DL (ref 12–15.9)
HGB UR QL STRIP.AUTO: NEGATIVE
HOLD SPECIMEN: NORMAL
HOLD SPECIMEN: NORMAL
HYALINE CASTS UR QL AUTO: NORMAL /LPF
IMM GRANULOCYTES # BLD AUTO: 0.02 10*3/MM3 (ref 0–0.05)
IMM GRANULOCYTES NFR BLD AUTO: 0.3 % (ref 0–0.5)
INR PPP: 0.98 (ref 0.93–1.1)
KETONES UR QL STRIP: NEGATIVE
LEUKOCYTE ESTERASE UR QL STRIP.AUTO: ABNORMAL
LYMPHOCYTES # BLD AUTO: 1.45 10*3/MM3 (ref 0.7–3.1)
LYMPHOCYTES NFR BLD AUTO: 20.3 % (ref 19.6–45.3)
MAGNESIUM SERPL-MCNC: 2.1 MG/DL (ref 1.6–2.4)
MCH RBC QN AUTO: 26.6 PG (ref 26.6–33)
MCHC RBC AUTO-ENTMCNC: 31.4 G/DL (ref 31.5–35.7)
MCV RBC AUTO: 84.7 FL (ref 79–97)
MONOCYTES # BLD AUTO: 0.71 10*3/MM3 (ref 0.1–0.9)
MONOCYTES NFR BLD AUTO: 9.9 % (ref 5–12)
MUCOUS THREADS URNS QL MICRO: NORMAL /HPF
NEUTROPHILS NFR BLD AUTO: 4.73 10*3/MM3 (ref 1.7–7)
NEUTROPHILS NFR BLD AUTO: 66 % (ref 42.7–76)
NITRITE UR QL STRIP: NEGATIVE
NRBC BLD AUTO-RTO: 0 /100 WBC (ref 0–0.2)
NT-PROBNP SERPL-MCNC: 1511 PG/ML (ref 0–1800)
PH UR STRIP.AUTO: 8 [PH] (ref 5–8)
PLATELET # BLD AUTO: 322 10*3/MM3 (ref 140–450)
PMV BLD AUTO: 9.9 FL (ref 6–12)
POTASSIUM SERPL-SCNC: 5 MMOL/L (ref 3.5–5.2)
PROT SERPL-MCNC: 7.5 G/DL (ref 6–8.5)
PROT UR QL STRIP: NEGATIVE
PROTHROMBIN TIME: 10.7 SECONDS (ref 9.6–11.7)
RBC # BLD AUTO: 4.51 10*6/MM3 (ref 3.77–5.28)
RBC # UR STRIP: NORMAL /HPF
REF LAB TEST METHOD: NORMAL
SODIUM SERPL-SCNC: 137 MMOL/L (ref 136–145)
SP GR UR STRIP: 1.01 (ref 1–1.03)
SQUAMOUS #/AREA URNS HPF: NORMAL /HPF
TROPONIN T SERPL HS-MCNC: 11 NG/L
UROBILINOGEN UR QL STRIP: ABNORMAL
WBC # UR STRIP: NORMAL /HPF
WBC NRBC COR # BLD AUTO: 7.16 10*3/MM3 (ref 3.4–10.8)
WHOLE BLOOD HOLD SPECIMEN: NORMAL
WHOLE BLOOD HOLD SPECIMEN: NORMAL

## 2024-07-24 PROCEDURE — P9612 CATHETERIZE FOR URINE SPEC: HCPCS

## 2024-07-24 PROCEDURE — 71045 X-RAY EXAM CHEST 1 VIEW: CPT

## 2024-07-24 PROCEDURE — 36415 COLL VENOUS BLD VENIPUNCTURE: CPT

## 2024-07-24 PROCEDURE — 85730 THROMBOPLASTIN TIME PARTIAL: CPT | Performed by: PHYSICIAN ASSISTANT

## 2024-07-24 PROCEDURE — 70450 CT HEAD/BRAIN W/O DYE: CPT

## 2024-07-24 PROCEDURE — 99285 EMERGENCY DEPT VISIT HI MDM: CPT

## 2024-07-24 PROCEDURE — 83880 ASSAY OF NATRIURETIC PEPTIDE: CPT | Performed by: PHYSICIAN ASSISTANT

## 2024-07-24 PROCEDURE — 93005 ELECTROCARDIOGRAM TRACING: CPT | Performed by: INTERNAL MEDICINE

## 2024-07-24 PROCEDURE — 85610 PROTHROMBIN TIME: CPT | Performed by: PHYSICIAN ASSISTANT

## 2024-07-24 PROCEDURE — 81001 URINALYSIS AUTO W/SCOPE: CPT | Performed by: PHYSICIAN ASSISTANT

## 2024-07-24 PROCEDURE — 84484 ASSAY OF TROPONIN QUANT: CPT | Performed by: PHYSICIAN ASSISTANT

## 2024-07-24 PROCEDURE — 85025 COMPLETE CBC W/AUTO DIFF WBC: CPT | Performed by: PHYSICIAN ASSISTANT

## 2024-07-24 PROCEDURE — 83735 ASSAY OF MAGNESIUM: CPT | Performed by: PHYSICIAN ASSISTANT

## 2024-07-24 PROCEDURE — 80053 COMPREHEN METABOLIC PANEL: CPT | Performed by: PHYSICIAN ASSISTANT

## 2024-07-24 PROCEDURE — 93005 ELECTROCARDIOGRAM TRACING: CPT

## 2024-07-24 PROCEDURE — 82550 ASSAY OF CK (CPK): CPT | Performed by: PHYSICIAN ASSISTANT

## 2024-07-24 RX ORDER — SODIUM CHLORIDE 0.9 % (FLUSH) 0.9 %
10 SYRINGE (ML) INJECTION AS NEEDED
Status: DISCONTINUED | OUTPATIENT
Start: 2024-07-24 | End: 2024-07-26 | Stop reason: HOSPADM

## 2024-07-24 RX ORDER — ACETAMINOPHEN 160 MG/5ML
650 SOLUTION ORAL EVERY 4 HOURS PRN
Status: DISCONTINUED | OUTPATIENT
Start: 2024-07-24 | End: 2024-07-26 | Stop reason: HOSPADM

## 2024-07-24 RX ORDER — ONDANSETRON 4 MG/1
4 TABLET, ORALLY DISINTEGRATING ORAL EVERY 6 HOURS PRN
Status: DISCONTINUED | OUTPATIENT
Start: 2024-07-24 | End: 2024-07-26 | Stop reason: HOSPADM

## 2024-07-24 RX ORDER — GABAPENTIN 100 MG/1
100 CAPSULE ORAL NIGHTLY
Status: DISCONTINUED | OUTPATIENT
Start: 2024-07-24 | End: 2024-07-25

## 2024-07-24 RX ORDER — ACETAMINOPHEN 325 MG/1
650 TABLET ORAL EVERY 4 HOURS PRN
Status: DISCONTINUED | OUTPATIENT
Start: 2024-07-24 | End: 2024-07-26 | Stop reason: HOSPADM

## 2024-07-24 RX ORDER — CYCLOBENZAPRINE HCL 10 MG
5 TABLET ORAL 2 TIMES DAILY PRN
Status: DISCONTINUED | OUTPATIENT
Start: 2024-07-24 | End: 2024-07-26 | Stop reason: HOSPADM

## 2024-07-24 RX ORDER — SODIUM CHLORIDE 0.9 % (FLUSH) 0.9 %
10 SYRINGE (ML) INJECTION EVERY 12 HOURS SCHEDULED
Status: DISCONTINUED | OUTPATIENT
Start: 2024-07-24 | End: 2024-07-26 | Stop reason: HOSPADM

## 2024-07-24 RX ORDER — DILTIAZEM HCL/D5W 125 MG/125
5-15 PLASTIC BAG, INJECTION (ML) INTRAVENOUS
Status: DISCONTINUED | OUTPATIENT
Start: 2024-07-24 | End: 2024-07-26 | Stop reason: HOSPADM

## 2024-07-24 RX ORDER — AMOXICILLIN 250 MG
2 CAPSULE ORAL 2 TIMES DAILY PRN
Status: DISCONTINUED | OUTPATIENT
Start: 2024-07-24 | End: 2024-07-26 | Stop reason: HOSPADM

## 2024-07-24 RX ORDER — NITROGLYCERIN 0.4 MG/1
0.4 TABLET SUBLINGUAL
Status: DISCONTINUED | OUTPATIENT
Start: 2024-07-24 | End: 2024-07-26 | Stop reason: HOSPADM

## 2024-07-24 RX ORDER — BISACODYL 5 MG/1
5 TABLET, DELAYED RELEASE ORAL DAILY PRN
Status: DISCONTINUED | OUTPATIENT
Start: 2024-07-24 | End: 2024-07-26 | Stop reason: HOSPADM

## 2024-07-24 RX ORDER — ONDANSETRON 2 MG/ML
4 INJECTION INTRAMUSCULAR; INTRAVENOUS EVERY 6 HOURS PRN
Status: DISCONTINUED | OUTPATIENT
Start: 2024-07-24 | End: 2024-07-26 | Stop reason: HOSPADM

## 2024-07-24 RX ORDER — ALUMINA, MAGNESIA, AND SIMETHICONE 2400; 2400; 240 MG/30ML; MG/30ML; MG/30ML
15 SUSPENSION ORAL EVERY 6 HOURS PRN
Status: DISCONTINUED | OUTPATIENT
Start: 2024-07-24 | End: 2024-07-26 | Stop reason: HOSPADM

## 2024-07-24 RX ORDER — ACETAMINOPHEN 650 MG/1
650 SUPPOSITORY RECTAL EVERY 4 HOURS PRN
Status: DISCONTINUED | OUTPATIENT
Start: 2024-07-24 | End: 2024-07-26 | Stop reason: HOSPADM

## 2024-07-24 RX ORDER — FERROUS SULFATE 324(65)MG
324 TABLET, DELAYED RELEASE (ENTERIC COATED) ORAL
Status: DISCONTINUED | OUTPATIENT
Start: 2024-07-25 | End: 2024-07-26 | Stop reason: HOSPADM

## 2024-07-24 RX ORDER — DILTIAZEM HYDROCHLORIDE 5 MG/ML
10 INJECTION INTRAVENOUS ONCE
Status: COMPLETED | OUTPATIENT
Start: 2024-07-24 | End: 2024-07-24

## 2024-07-24 RX ORDER — METOPROLOL SUCCINATE 50 MG/1
50 TABLET, EXTENDED RELEASE ORAL DAILY
Status: DISCONTINUED | OUTPATIENT
Start: 2024-07-25 | End: 2024-07-26 | Stop reason: HOSPADM

## 2024-07-24 RX ORDER — BISACODYL 10 MG
10 SUPPOSITORY, RECTAL RECTAL DAILY PRN
Status: DISCONTINUED | OUTPATIENT
Start: 2024-07-24 | End: 2024-07-26 | Stop reason: HOSPADM

## 2024-07-24 RX ORDER — POLYETHYLENE GLYCOL 3350 17 G/17G
17 POWDER, FOR SOLUTION ORAL DAILY PRN
Status: DISCONTINUED | OUTPATIENT
Start: 2024-07-24 | End: 2024-07-26 | Stop reason: HOSPADM

## 2024-07-24 RX ORDER — SODIUM CHLORIDE 9 MG/ML
40 INJECTION, SOLUTION INTRAVENOUS AS NEEDED
Status: DISCONTINUED | OUTPATIENT
Start: 2024-07-24 | End: 2024-07-26 | Stop reason: HOSPADM

## 2024-07-24 RX ADMIN — Medication 10 ML: at 20:07

## 2024-07-24 RX ADMIN — CYCLOBENZAPRINE 5 MG: 10 TABLET, FILM COATED ORAL at 22:24

## 2024-07-24 RX ADMIN — APIXABAN 5 MG: 5 TABLET, FILM COATED ORAL at 20:07

## 2024-07-24 RX ADMIN — Medication 5 MG/HR: at 15:45

## 2024-07-24 RX ADMIN — DILTIAZEM HYDROCHLORIDE 10 MG: 5 INJECTION, SOLUTION INTRAVENOUS at 14:59

## 2024-07-24 NOTE — TELEPHONE ENCOUNTER
Caller: JAMARCUS    Relationship: Home Health    Best call back number: [phone]    What is the best time to reach you: ANY    Who are you requesting to speak with (clinical staff, provider,  specific staff member): CLINICAL    Do you know the name of the person who called: JAMARCUS    What was the call regarding: PATIENTS BP [id number]/106 HEART RATE [id number] PER MINUTE AND PATIENT WAS CRYING AND STATED SHE FET OUT OF HER HEAD.    JAMARCUS STATED SHE COULDN'T REMEMBER ANYTHING AND JUST DID NOT FEEL RIGHT.    JAMARCUS NOTED THAT PATIENT WAS COMPLETELY DIFFERENT FROM MONDAY TO TODAY.    JAMARCUS SENT HER DIRECTLY TO [place] [facility] TODAY.    GABAPENTIN HAD BEEN STOPPED.    Is it okay if the provider responds through MyChart: NO

## 2024-07-24 NOTE — Clinical Note
Level of Care: Telemetry [5]   Admitting Physician: VIVI CLARK [[id number]]   Attending Physician: VIVI CLARK [[id number]]

## 2024-07-24 NOTE — ED PROVIDER NOTES
Subjective   History of Present Illness  Chief Complaint: Generalized weakness    Patient is a 84-year-old female with history of CHF CAD A-fib presents to the ER with complaints of generalized weakness and fatigue for couple days.  Patient states that she feels very tired.  She denies any complaints of pain.  No chest pain or headache.  No abdominal pain.  She does report feeling mildly nauseous.  No vomiting or diarrhea.  No lower extremity swelling.  No fever or chills.  Patient states that she started on gabapentin last week for neuropathy, states that she has been on this in the past but it was restarted recently but otherwise no new medications.  Patient does take Eliquis.  No recent fall or trauma.    PCP: iLv Duron    History provided by:  Patient      Review of Systems   Constitutional:  Positive for fatigue. Negative for chills and fever.   HENT:  Negative for sore throat and trouble swallowing.    Eyes: Negative.    Respiratory:  Negative for shortness of breath and wheezing.    Cardiovascular:  Positive for palpitations. Negative for chest pain.   Gastrointestinal:  Positive for nausea. Negative for abdominal pain, diarrhea and vomiting.   Endocrine: Negative.    Genitourinary:  Negative for dysuria.   Musculoskeletal:  Negative for myalgias.   Skin:  Negative for rash.   Allergic/Immunologic: Negative.    Neurological:  Positive for weakness (Generalzied). Negative for headaches.   Psychiatric/Behavioral:  Negative for behavioral problems.    All other systems reviewed and are negative.      Past Medical History:   Diagnosis Date    Acquired spondylolisthesis of lumbosacral region     Acute kidney injury 07/22/2022    Anxiety     Arthritis     Atrial fibrillation     paroxysmal    Broken shoulder     left-- due to fall 11-7-19 was at [facility]    Chronic pain disorder     Chronic systolic CHF (congestive heart failure) 01/05/2023    EF 36-40%    Coronary artery disease involving native coronary artery of  native heart without angina pectoris 04/02/2021    nonobstructive    DDD (degenerative disc disease), cervical     DDD (degenerative disc disease), lumbar     Depression     Edema     9/2020 foot    GERD (gastroesophageal reflux disease)     H/O fall     Heart failure with mid-range ejection fraction 03/05/2022    EF 45% per 2D echo    Hypertension     Hypothyroidism     Insomnia     Low back pain     Moderate mitral regurgitation 01/05/2023    Neuropathy     Nonrheumatic tricuspid valve regurgitation     Pain in both feet     Polypharmacy     PONV (postoperative nausea and vomiting)     Pulmonary hypertension     Radiculopathy     Restless legs     Sacroiliitis     Sick sinus syndrome     Added automatically from request for surgery [id number]    Spondylolisthesis     Stage 3a chronic kidney disease     Urinary incontinence     Vitamin D deficiency        Allergies   Allergen Reactions    Baclofen Rash    Codeine Nausea Only    Ibuprofen Unknown (See Comments)     Patient doesn't know----Motin    Methocarbamol Unknown (See Comments)     Patient doesn't know    Naproxen Unknown (See Comments)     Pt. Doesn't know     Tizanidine Hcl Other (See Comments)     Syncope     Tolmetin Dizziness     Pt. Doesn't know-- same as Tolectin       Past Surgical History:   Procedure Laterality Date    BACK SURGERY  07/19/2018    PDC &  PSF L3-L5 insitu    CARDIAC CATHETERIZATION N/A 4/2/2021    Procedure: Cardiac Catheterization/Vascular Study;  Surgeon: Barrett Evans MD;  Location: [facility] CATH INVASIVE LOCATION;  Service: Cardiovascular;  Laterality: N/A;    CARDIAC ELECTROPHYSIOLOGY PROCEDURE N/A 1/17/2023    Procedure: Pacemaker DC new;  Surgeon: Baljeet Valero MD;  Location: [facility] CATH INVASIVE LOCATION;  Service: Cardiovascular;  Laterality: N/A;    CHOLECYSTECTOMY      COLONOSCOPY      COLONOSCOPY N/A 7/10/2024    Procedure: COLONOSCOPY with cold snare polypectomy x1 and endoscopic clipping x2;  Surgeon:  MARLIN Vanegas MD;  Location: [facility] ENDOSCOPY;  Service: Gastroenterology;  Laterality: N/A;  Post- colon polyps, diverticulosis    ENDOSCOPY N/A 9/14/2023    Procedure: ESOPHAGOGASTRODUODENOSCOPY;  Surgeon: Ulysses Lamas MD;  Location: [facility] ENDOSCOPY;  Service: Gastroenterology;  Laterality: N/A;  gastritis, inflammatory gastric polyp    ENDOSCOPY N/A 7/10/2024    Procedure: ESOPHAGOGASTRODUODENOSCOPY;  Surgeon: MARLIN Vanegas MD;  Location: [facility] ENDOSCOPY;  Service: Gastroenterology;  Laterality: N/A;  Post- hiatal hernia, gastric polyps, gastritis    HYSTERECTOMY      ROTATOR CUFF REPAIR Left        Family History   Problem Relation Age of Onset    Cancer Father     Diabetes Son     Cancer Paternal Aunt     Heart disease Paternal Aunt     Cancer Paternal Uncle        Social History     Socioeconomic History    Marital status:    Tobacco Use    Smoking status: Never     Passive exposure: Never    Smokeless tobacco: Never   Vaping Use    Vaping status: Never Used   Substance and Sexual Activity    Alcohol use: No    Drug use: Never    Sexual activity: Defer           Objective   Physical Exam  Vitals and nursing note reviewed.   Constitutional:       Appearance: Normal appearance. She is well-developed and normal weight. She is not ill-appearing or toxic-appearing.   HENT:      Head: Normocephalic and atraumatic.   Eyes:      Pupils: Pupils are equal, round, and reactive to light.   Cardiovascular:      Rate and Rhythm: Tachycardia present. Rhythm irregular.      Pulses: Normal pulses.      Heart sounds: Normal heart sounds. No murmur heard.  Pulmonary:      Effort: Pulmonary effort is normal. No respiratory distress.      Breath sounds: Normal breath sounds. No wheezing.   Abdominal:      General: Bowel sounds are normal. There is no distension.      Palpations: Abdomen is soft.      Tenderness: There is no abdominal tenderness. There is no right CVA tenderness or left CVA tenderness.  "  Musculoskeletal:         General: Normal range of motion.   Skin:     General: Skin is warm and dry.      Capillary Refill: Capillary refill takes less than 2 seconds.      Findings: No rash.   Neurological:      General: No focal deficit present.      Mental Status: She is alert and oriented to person, place, and time.      Motor: No weakness.   Psychiatric:         Mood and Affect: Mood normal.         Behavior: Behavior normal.         ECG 12 Lead      Date/Time: 7/24/2024 3:23 PM    Performed by: Earline Estrella PA  Authorized by: Earline Estrella PA  Interpreted by ED physician  Previous ECG: no previous ECG available  Rhythm: sinus tachycardia  Rate: tachycardic  BPM: 137  QRS axis: normal  Conduction: conduction normal  Clinical impression: abnormal ECG               ED Course  ED Course as of 07/24/24 1737 Wed Jul 24, 2024   1511 Temp: 99.9 °F (37.7 °C)  Rectal [MC]   1520 Patient reevaluated after given Cardizem bolus, patient appears to be in a flutter with a regular rate.  After discussion with ER attending Dr. Weaver will start on Cardizem infusion []   1649 Per primary RN, she spoke with the patient's neighbor who helps take care of her at home.  Neighbor states that patient is \"addicted to gabapentin\" and states she has been taking more of this than prescribed []   1722 Spoke with Dr. Lopes regarding admission []      ED Course User Index  [MC] Earline Estrella PA    /78   Pulse (!) 126   Temp 98.7 °F (37.1 °C) (Oral)   Resp 14   Ht 160 cm (63\")   Wt 77 kg (169 lb 12.1 oz)   SpO2 90%   BMI 30.07 kg/m²   Labs Reviewed   COMPREHENSIVE METABOLIC PANEL - Abnormal; Notable for the following components:       Result Value    Glucose 128 (*)     BUN/Creatinine Ratio 25.8 (*)     All other components within normal limits    Narrative:     GFR Normal >60  Chronic Kidney Disease <60  Kidney Failure <15    The GFR formula is only valid for adults with stable renal function between ages 18 " and 70.   URINALYSIS W/ CULTURE IF INDICATED - Abnormal; Notable for the following components:    Leuk Esterase, UA Trace (*)     All other components within normal limits    Narrative:     In absence of clinical symptoms, the presence of pyuria, bacteria, and/or nitrites on the urinalysis result does not correlate with infection.   APTT - Abnormal; Notable for the following components:    PTT 24.2 (*)     All other components within normal limits   CBC WITH AUTO DIFFERENTIAL - Abnormal; Notable for the following components:    MCHC 31.4 (*)     RDW 24.9 (*)     RDW-SD 73.2 (*)     All other components within normal limits   PROTIME-INR - Normal   SINGLE HS TROPONIN T - Normal    Narrative:     High Sensitive Troponin T Reference Range:  <14.0 ng/L- Negative Female for AMI  <22.0 ng/L- Negative Male for AMI  >=14 - Abnormal Female indicating possible myocardial injury.  >=22 - Abnormal Male indicating possible myocardial injury.   Clinicians would have to utilize clinical acumen, EKG, Troponin, and serial changes to determine if it is an Acute Myocardial Infarction or myocardial injury due to an underlying chronic condition.        BNP (IN-HOUSE) - Normal    Narrative:     This assay is used as an aid in the diagnosis of individuals suspected of having heart failure. It can be used as an aid in the diagnosis of acute decompensated heart failure (ADHF) in patients presenting with signs and symptoms of ADHF to the emergency department (ED). In addition, NT-proBNP of <300 pg/mL indicates ADHF is not likely.    Age Range Result Interpretation  NT-proBNP Concentration (pg/mL:      <50             Positive            >450                   Gray                 300-450                    Negative             <300    50-75           Positive            >900                  Gray                300-900                  Negative            <300      >75             Positive            >1800                  [place]                 300-1800                  Negative            <300   MAGNESIUM - Normal   CK - Normal   RAINBOW DRAW    Narrative:     The following orders were created for panel order Riparius Draw.  Procedure                               Abnormality         Status                     ---------                               -----------         ------                     Green Top (Gel)[[id number]]                                  Final result               Lavender Top[[id number]]                                     Final result                 Please view results for these tests on the individual orders.   URINALYSIS, MICROSCOPIC ONLY   GREEN TOP   LAVENDER TOP   CBC AND DIFFERENTIAL    Narrative:     The following orders were created for panel order CBC & Differential.  Procedure                               Abnormality         Status                     ---------                               -----------         ------                     CBC Auto Differential[[id number]]        Abnormal            Final result                 Please view results for these tests on the individual orders.   EXTRA TUBES    Narrative:     The following orders were created for panel order Extra Tubes.  Procedure                               Abnormality         Status                     ---------                               -----------         ------                     Lavender Top[[id number]]                                     Final result               Green Top (Gel)[[id number]]                                  Final result                 Please view results for these tests on the individual orders.   LAVENDER TOP   GREEN TOP     Medications   sodium chloride 0.9 % flush 10 mL (has no administration in time range)   dilTIAZem (CARDIZEM) 125 mg in 125 mL D5W infusion (15 mg/hr Intravenous Rate/Dose Change 7/24/24 1714)   dilTIAZem (CARDIZEM) injection 10 mg (10 mg Intravenous Given 7/24/24 1459)     CT Head Without Contrast    Result Date:  "7/24/2024  Impression: 1. Age-appropriate atrophy with mild chronic periventricular ischemic changes. 2. No acute intracranial findings Electronically Signed: Mark Steward MD  7/24/2024 4:27 PM EDT  Workstation ID: [id number]    XR Chest 1 View    Result Date: 7/24/2024  Impression: No active disease Electronically Signed: Mark Steward MD  7/24/2024 3:19 PM EDT  Workstation ID: [id number]                                            Medical Decision Making  While in the ED IV was placed and labs were obtained appropriate PPE was worn during exam and throughout all encounters with the patient.  Patient with above evaluation.  She is afebrile nontoxic in appearance and in no acute respiratory distress.  She reports feeling \"off\".  Neurological dam is nonfocal.  EKG shows atopic arrhythmia with a rate of 140, she does have history of A-fib.  She was given bolus of 10 mg Cardizem, rate improved and was noted to be atrial flutter.  She was placed on Cardizem drip.  Lab work otherwise reassuring.  Normal troponin, normal electrolytes, magnesium, urinalysis negative.  Chest x-ray shows no evidence of pneumonia.  CT head unremarkable.  Patient be admitted for further treatment for A-fib RVR.  Spoke with Dr. Lopes regarding admission.    Based on the clinical findings at this time I anticipate that the patient will require 2 midnight stay.    Problems Addressed:  Atrial fibrillation with RVR: acute illness or injury  Generalized weakness: acute illness or injury    Amount and/or Complexity of Data Reviewed  Labs: ordered. Decision-making details documented in ED Course.  Radiology: ordered. Decision-making details documented in ED Course.  ECG/medicine tests: ordered. Decision-making details documented in ED Course.    Risk  Prescription drug management.  Decision regarding hospitalization.        Final diagnoses:   Atrial fibrillation with RVR   Generalized weakness       ED Disposition  ED Disposition       ED Disposition "   Decision to Admit    Condition   --    Comment   Level of Care: Telemetry [5]   Admitting Physician: VIVI CLARK [[id number]]   Attending Physician: VIVI CLARK [[id number]]                 No follow-up provider specified.       Medication List      No changes were made to your prescriptions during this visit.            Earline Estrella PA  07/24/24 0393

## 2024-07-25 LAB
ANION GAP SERPL CALCULATED.3IONS-SCNC: 11.4 MMOL/L (ref 5–15)
BASOPHILS # BLD AUTO: 0.05 10*3/MM3 (ref 0–0.2)
BASOPHILS NFR BLD AUTO: 0.7 % (ref 0–1.5)
BUN SERPL-MCNC: 22 MG/DL (ref 8–23)
BUN/CREAT SERPL: 24.7 (ref 7–25)
CALCIUM SPEC-SCNC: 9.3 MG/DL (ref 8.6–10.5)
CHLORIDE SERPL-SCNC: 101 MMOL/L (ref 98–107)
CO2 SERPL-SCNC: 25.6 MMOL/L (ref 22–29)
CREAT SERPL-MCNC: 0.89 MG/DL (ref 0.57–1)
DEPRECATED RDW RBC AUTO: 74.3 FL (ref 37–54)
EGFRCR SERPLBLD CKD-EPI 2021: 64.4 ML/MIN/1.73
EOSINOPHIL # BLD AUTO: 0.2 10*3/MM3 (ref 0–0.4)
EOSINOPHIL NFR BLD AUTO: 3 % (ref 0.3–6.2)
ERYTHROCYTE [DISTWIDTH] IN BLOOD BY AUTOMATED COUNT: 25.2 % (ref 12.3–15.4)
GLUCOSE SERPL-MCNC: 124 MG/DL (ref 65–99)
HCT VFR BLD AUTO: 36.5 % (ref 34–46.6)
HGB BLD-MCNC: 11.2 G/DL (ref 12–15.9)
IMM GRANULOCYTES # BLD AUTO: 0.02 10*3/MM3 (ref 0–0.05)
IMM GRANULOCYTES NFR BLD AUTO: 0.3 % (ref 0–0.5)
LYMPHOCYTES # BLD AUTO: 1.78 10*3/MM3 (ref 0.7–3.1)
LYMPHOCYTES NFR BLD AUTO: 26.5 % (ref 19.6–45.3)
MAGNESIUM SERPL-MCNC: 2 MG/DL (ref 1.6–2.4)
MCH RBC QN AUTO: 26 PG (ref 26.6–33)
MCHC RBC AUTO-ENTMCNC: 30.7 G/DL (ref 31.5–35.7)
MCV RBC AUTO: 84.7 FL (ref 79–97)
MONOCYTES # BLD AUTO: 0.67 10*3/MM3 (ref 0.1–0.9)
MONOCYTES NFR BLD AUTO: 10 % (ref 5–12)
NEUTROPHILS NFR BLD AUTO: 4 10*3/MM3 (ref 1.7–7)
NEUTROPHILS NFR BLD AUTO: 59.5 % (ref 42.7–76)
NRBC BLD AUTO-RTO: 0 /100 WBC (ref 0–0.2)
PHOSPHATE SERPL-MCNC: 4.1 MG/DL (ref 2.5–4.5)
PLATELET # BLD AUTO: 304 10*3/MM3 (ref 140–450)
PMV BLD AUTO: 9.4 FL (ref 6–12)
POTASSIUM SERPL-SCNC: 4.3 MMOL/L (ref 3.5–5.2)
QT INTERVAL: 328 MS
QTC INTERVAL: 497 MS
RBC # BLD AUTO: 4.31 10*6/MM3 (ref 3.77–5.28)
SODIUM SERPL-SCNC: 138 MMOL/L (ref 136–145)
WBC NRBC COR # BLD AUTO: 6.72 10*3/MM3 (ref 3.4–10.8)

## 2024-07-25 PROCEDURE — 83735 ASSAY OF MAGNESIUM: CPT | Performed by: INTERNAL MEDICINE

## 2024-07-25 PROCEDURE — 97162 PT EVAL MOD COMPLEX 30 MIN: CPT

## 2024-07-25 PROCEDURE — 25010000002 HYDROMORPHONE 1 MG/ML SOLUTION: Performed by: STUDENT IN AN ORGANIZED HEALTH CARE EDUCATION/TRAINING PROGRAM

## 2024-07-25 PROCEDURE — 80048 BASIC METABOLIC PNL TOTAL CA: CPT | Performed by: INTERNAL MEDICINE

## 2024-07-25 PROCEDURE — 97530 THERAPEUTIC ACTIVITIES: CPT

## 2024-07-25 PROCEDURE — 99222 1ST HOSP IP/OBS MODERATE 55: CPT | Performed by: INTERNAL MEDICINE

## 2024-07-25 PROCEDURE — 84100 ASSAY OF PHOSPHORUS: CPT | Performed by: INTERNAL MEDICINE

## 2024-07-25 PROCEDURE — 85025 COMPLETE CBC W/AUTO DIFF WBC: CPT | Performed by: INTERNAL MEDICINE

## 2024-07-25 PROCEDURE — 97166 OT EVAL MOD COMPLEX 45 MIN: CPT

## 2024-07-25 PROCEDURE — 97535 SELF CARE MNGMENT TRAINING: CPT

## 2024-07-25 RX ORDER — OXYCODONE HYDROCHLORIDE 5 MG/1
5 TABLET ORAL EVERY 4 HOURS PRN
Status: DISCONTINUED | OUTPATIENT
Start: 2024-07-25 | End: 2024-07-26 | Stop reason: HOSPADM

## 2024-07-25 RX ORDER — GABAPENTIN 100 MG/1
100 CAPSULE ORAL NIGHTLY
Status: DISCONTINUED | OUTPATIENT
Start: 2024-07-25 | End: 2024-07-25

## 2024-07-25 RX ORDER — AMIODARONE HYDROCHLORIDE 200 MG/1
200 TABLET ORAL EVERY 12 HOURS SCHEDULED
Status: DISCONTINUED | OUTPATIENT
Start: 2024-07-25 | End: 2024-07-26 | Stop reason: HOSPADM

## 2024-07-25 RX ORDER — SPIRONOLACTONE 25 MG/1
25 TABLET ORAL DAILY
Status: DISCONTINUED | OUTPATIENT
Start: 2024-07-26 | End: 2024-07-26 | Stop reason: HOSPADM

## 2024-07-25 RX ADMIN — FERROUS SULFATE TAB EC 324 MG (65 MG FE EQUIVALENT) 324 MG: 324 (65 FE) TABLET DELAYED RESPONSE at 08:56

## 2024-07-25 RX ADMIN — AMIODARONE HYDROCHLORIDE 200 MG: 200 TABLET ORAL at 09:50

## 2024-07-25 RX ADMIN — Medication 10 ML: at 08:57

## 2024-07-25 RX ADMIN — HYDROMORPHONE HYDROCHLORIDE 0.25 MG: 1 INJECTION, SOLUTION INTRAMUSCULAR; INTRAVENOUS; SUBCUTANEOUS at 04:22

## 2024-07-25 RX ADMIN — OXYCODONE 5 MG: 5 TABLET ORAL at 08:56

## 2024-07-25 RX ADMIN — ACETAMINOPHEN 650 MG: 325 TABLET, FILM COATED ORAL at 01:51

## 2024-07-25 RX ADMIN — METOPROLOL SUCCINATE 50 MG: 50 TABLET, EXTENDED RELEASE ORAL at 08:56

## 2024-07-25 RX ADMIN — Medication 10 MG/HR: at 03:30

## 2024-07-25 RX ADMIN — APIXABAN 5 MG: 5 TABLET, FILM COATED ORAL at 20:41

## 2024-07-25 RX ADMIN — AMIODARONE HYDROCHLORIDE 200 MG: 200 TABLET ORAL at 20:41

## 2024-07-25 RX ADMIN — Medication 10 ML: at 20:41

## 2024-07-25 RX ADMIN — CYCLOBENZAPRINE 5 MG: 10 TABLET, FILM COATED ORAL at 05:41

## 2024-07-25 RX ADMIN — GABAPENTIN 100 MG: 100 CAPSULE ORAL at 03:30

## 2024-07-25 RX ADMIN — APIXABAN 5 MG: 5 TABLET, FILM COATED ORAL at 08:56

## 2024-07-25 RX ADMIN — OXYCODONE 5 MG: 5 TABLET ORAL at 20:41

## 2024-07-25 NOTE — H&P
History and Physical   Catalina Moreno : 1940 MRN:[id number] LOS:0     Reason for admission: Atrial fibrillation     Assessment / Plan     #Generalized weakness  #A fib with RVR  -presented with generalizied weakness   -RVP pending   -CXR and UA fine   -echo in  with EF of 46-50%   -A fib with RVR in the ED and now on cardizem drip  -eliquis resumed   -cardio consult   -tele to continue   -PT OT to see     Neuropathic pain/RLS  -on gabapentin         Nutrition: Diet: Regular/House; Fluid Consistency: Thin (IDDSI 0)     DVT Prophylaxis: [unfilled]     History of Present illness     A 84 y.o. old female patient with PMH of PAF CAD presents to the hospital with complaints of general weakness and palpitation.In the Ed, she is with A fib with RVR and started on cardizem drip. She is on amiodarone and eliquis at home. She is also using gabapentin for the nerve pain. CXR CT head and labs seems to be fairly stable.     Admitted for a fib with RVR.       Subjective / Review of systems     Review of Systems   No cp     Past Medical/Surgical/Social/Family History & Allergies     Past Medical History:   Diagnosis Date    Acquired spondylolisthesis of lumbosacral region     Acute kidney injury 2022    Anxiety     Arthritis     Atrial fibrillation     paroxysmal    Broken shoulder     left-- due to fall 19 was at [facility]    Chronic pain disorder     Chronic systolic CHF (congestive heart failure) 2023    EF 36-40%    Coronary artery disease involving native coronary artery of native heart without angina pectoris 2021    nonobstructive    DDD (degenerative disc disease), cervical     DDD (degenerative disc disease), lumbar     Depression     Edema     2020 foot    GERD (gastroesophageal reflux disease)     H/O fall     Heart failure with mid-range ejection fraction 2022    EF 45% per 2D echo    Hypertension     Hypothyroidism     Insomnia     Low back pain     Moderate mitral  regurgitation 01/05/2023    Neuropathy     Nonrheumatic tricuspid valve regurgitation     Pain in both feet     Polypharmacy     PONV (postoperative nausea and vomiting)     Pulmonary hypertension     Radiculopathy     Restless legs     Sacroiliitis     Sick sinus syndrome     Added automatically from request for surgery [id number]    Spondylolisthesis     Stage 3a chronic kidney disease     Urinary incontinence     Vitamin D deficiency       Past Surgical History:   Procedure Laterality Date    BACK SURGERY  07/19/2018    PDC &  PSF L3-L5 insitu    CARDIAC CATHETERIZATION N/A 4/2/2021    Procedure: Cardiac Catheterization/Vascular Study;  Surgeon: Barrett Evans MD;  Location: [facility] CATH INVASIVE LOCATION;  Service: Cardiovascular;  Laterality: N/A;    CARDIAC ELECTROPHYSIOLOGY PROCEDURE N/A 1/17/2023    Procedure: Pacemaker DC new;  Surgeon: Baljeet Valero MD;  Location: [facility] CATH INVASIVE LOCATION;  Service: Cardiovascular;  Laterality: N/A;    CHOLECYSTECTOMY      COLONOSCOPY      COLONOSCOPY N/A 7/10/2024    Procedure: COLONOSCOPY with cold snare polypectomy x1 and endoscopic clipping x2;  Surgeon: MARLIN Vanegas MD;  Location: [facility] ENDOSCOPY;  Service: Gastroenterology;  Laterality: N/A;  Post- colon polyps, diverticulosis    ENDOSCOPY N/A 9/14/2023    Procedure: ESOPHAGOGASTRODUODENOSCOPY;  Surgeon: Ulysses Lamas MD;  Location: [facility] ENDOSCOPY;  Service: Gastroenterology;  Laterality: N/A;  gastritis, inflammatory gastric polyp    ENDOSCOPY N/A 7/10/2024    Procedure: ESOPHAGOGASTRODUODENOSCOPY;  Surgeon: MARLIN Vanegas MD;  Location: [facility] ENDOSCOPY;  Service: Gastroenterology;  Laterality: N/A;  Post- hiatal hernia, gastric polyps, gastritis    HYSTERECTOMY      ROTATOR CUFF REPAIR Left       Social History     Socioeconomic History    Marital status:    Tobacco Use    Smoking status: Never     Passive exposure: Never    Smokeless tobacco: Never   Vaping Use     Vaping status: Never Used   Substance and Sexual Activity    Alcohol use: No    Drug use: Never    Sexual activity: Defer      Family History   Problem Relation Age of Onset    Cancer Father     Diabetes Son     Cancer Paternal Aunt     Heart disease Paternal Aunt     Cancer Paternal Uncle       Allergies   Allergen Reactions    Baclofen Rash    Codeine Nausea Only    Ibuprofen Unknown (See Comments)     Patient doesn't know----Motin    Methocarbamol Unknown (See Comments)     Patient doesn't know    Naproxen Unknown (See Comments)     Pt. Doesn't know     Tizanidine Hcl Other (See Comments)     Syncope     Tolmetin Dizziness     Pt. Doesn't know-- same as Tolectin        Home Medications     Prior to Admission medications    Medication Sig Start Date End Date Taking? Authorizing Provider   apixaban (ELIQUIS) 5 MG tablet tablet Take 1 tablet by mouth Every 12 (Twelve) Hours. Indications: Atrial Fibrillation 7/11/24  Yes Zuleima Canales MD   gabapentin (NEURONTIN) 100 MG capsule Take 1 capsule by mouth Every Night. For pain 7/17/24  Yes Liv Duron APRN   amiodarone (PACERONE) 200 MG tablet Take 1 tablet by mouth Every 8 (Eight) Hours for 12 days, THEN 1 tablet Every 12 (Twelve) Hours for 14 days, THEN 1 tablet Daily for 30 days. 7/11/24 9/5/24  Zuleima Canales MD   cyclobenzaprine (FLEXERIL) 5 MG tablet Take 1 tablet by mouth 2 (Two) Times a Day As Needed for Muscle Spasms.    ProviderElton MD   ferrous gluconate (FERGON) 324 MG tablet Take 1 tablet by mouth Every Other Day for 60 days. 7/11/24 9/9/24  Zuleima Canales MD   melatonin 3 MG tablet Take 1 tablet by mouth Every Night.    ProviderElton MD   metoprolol succinate XL (TOPROL-XL) 50 MG 24 hr tablet Take 1 tablet by mouth Daily. For Afib/Hypertension 7/17/24   Liv Duron APRN   spironolactone (Aldactone) 25 MG tablet Take 1 tablet by mouth Daily. For swelling/CHF 7/17/24   Liv Duron APRN   pantoprazole  (PROTONIX) 40 MG EC tablet TAKE 1 TABLET BY MOUTH EVERY DAY 7/18/24 7/24/24  Liv Duron YULI, APRN      Objective / Physical Exam   Vital signs:  Temp: 98.7 °F (37.1 °C)  BP: 121/70  Heart Rate: 89  Resp: 15  SpO2: 93 %  Weight: 77 kg (169 lb 12.1 oz)    Admission Weight: Weight: 77 kg (169 lb 12.1 oz)    Physical Exam   Physical Exam  HENT:      Head: Normocephalic and atraumatic.      Nose: Nose normal.   Eyes:      Extraocular Movements: Extraocular movements intact.      Conjunctivae/sclera: Conjunctivae normal.      Pupils: Pupils are equal, round, and reactive to light.   Cardiovascular:   IRIR   Pulmonary:      Effort: normal      Breath sounds: normal   Abdominal:      General: Abdomen is flat. Bowel sounds are normal.      Palpations: Abdomen is soft.   Musculoskeletal:         General: Normal range of motion.      Cervical back: Normal range of motion and neck supple.   Skin:     General: Skin is dry.   Neurological:      General: No focal deficit present.      Mental Status: alert.   Psychiatric:         Mood and Affect: Mood normal.        Labs     Results from last 7 days   Lab Units 07/24/24  1445   WBC 10*3/mm3 7.16   HEMATOCRIT % 38.2   PLATELETS 10*3/mm3 322      Results from last 7 days   Lab Units 07/24/24  1614   SODIUM mmol/L 137   POTASSIUM mmol/L 5.0   CHLORIDE mmol/L 100   CO2 mmol/L 26.9   BUN mg/dL 23   CREATININE mg/dL 0.89        Current Medications   Scheduled Meds:apixaban, 5 mg, Oral, Q12H  [START ON 7/25/2024] ferrous sulfate, 324 mg, Oral, Daily With Breakfast  gabapentin, 100 mg, Oral, Nightly  [START ON 7/25/2024] metoprolol succinate XL, 50 mg, Oral, Daily  sodium chloride, 10 mL, Intravenous, Q12H         Continuous Infusions:dilTIAZem, 5-15 mg/hr, Last Rate: 10 mg/hr (07/24/24 1930)         Monalisa Lopes MD  [facility]   07/24/24   22:01 EDT

## 2024-07-25 NOTE — PLAN OF CARE
Goal Outcome Evaluation:  Plan of Care Reviewed With: patient           Outcome Evaluation: Pt is an 83 y/o F admitted to [facility] 7/24/24 with c/o general weakness and palpitations. Hospital dx A.fib with RVR.  PMHx significant for CKDIII, HTN, pacemaker in place, depressive disorder, fibromyalgia, CHF, hx falls, DDD, and polyneuropathy. At baseline pt resides alone in [facility] with 0 VLADIMIR. Pt typically (I) with ADLs and uses a quad cane or RW for mobility. Pt uses shower chair. She reports she is still driving and able to walk within grocery store. Pt reports fall and hitting her head within last two months. Pt with limited social support; her neighbor reports concern that pt with increased confusion including walking in street and needing increased support. This date, pt completes bed mobility with CGA, requires MIN A with sit to stand from bed and commode. Setup provided for lower body dressing and supervision for toileting. Of note, pt did describe dizziness after ambulation and ADL performance. Supine /67 mmHg. Sitting /63 mmHg. Pt with s/s hypotension post gait -- standing BP post gait 97/55 mmHg. Ot feels pt unsafe for dc home alone at this time, as she is weak and below baseline. Recommending SNF when medically stable.

## 2024-07-25 NOTE — CASE MANAGEMENT/SOCIAL WORK
Discharge Planning Assessment   Gabriel     Patient Name: Catalina Moreno  MRN: [id number]  Today's Date: 7/25/2024    Admit Date: 7/24/2024    Plan: Return home alone.  Current [facility]   Discharge Needs Assessment       Row Name 07/25/24 1445       Living Environment    People in Home alone    Current Living Arrangements home    Potentially Unsafe Housing Conditions none    In the past 12 months has the electric, gas, oil, or water company threatened to shut off services in your home? No    Primary Care Provided by self    Provides Primary Care For no one    Quality of Family Relationships helpful;involved;supportive    Able to Return to Prior Arrangements yes       Resource/Environmental Concerns    Resource/Environmental Concerns none    Transportation Concerns none       Transportation Needs    In the past 12 months, has lack of transportation kept you from medical appointments or from getting medications? no    In the past 12 months, has lack of transportation kept you from meetings, work, or from getting things needed for daily living? No       Food Insecurity    Within the past 12 months, you worried that your food would run out before you got the money to buy more. Never true    Within the past 12 months, the food you bought just didn't last and you didn't have money to get more. Never true       Transition Planning    Patient/Family Anticipates Transition to home    Patient/Family Anticipated Services at Transition none    Transportation Anticipated car, drives self;family or friend will provide       Discharge Needs Assessment    Readmission Within the Last 30 Days no previous admission in last 30 days    Equipment Currently Used at Home walker, rolling;cane, straight    Anticipated Changes Related to Illness none    Equipment Needed After Discharge none                   Discharge Plan       Row Name 07/25/24 1410       Plan    Plan Return home alone.  Current [facility]    Patient/Family in  Agreement with Plan yes    Plan Comments Met with patient at bedside.  Lives at home alone.  IADL.   Current with [facility] .   Does not drive, but has a friend who is able to take to Dr shetty. Friend also fills up pill containers weekly.  Discussed home medications, as it shows medications not picked up from [facility], but patient states she did not have money on discharge from hospital, so had medications, transferred meds to [facility] as is taking as instructed.  Verified PCP.  [facility]ose [facility], declined [facility].   Barriers to discharge: Amio oral.  Cardizem gtt.  2L O2.   Cardio following.                  Continued Care and Services - Admitted Since 7/24/2024       Home Medical Care       Service Provider Request Status Selected Services Address Phone Fax Patient Preferred    [facility] [place] Accepted N/A [address], [place] [address] [phone] --                     Functional Status       Row Name 07/25/24 1445       Functional Status    Usual Activity Tolerance moderate    Current Activity Tolerance moderate       Functional Status, IADL    Medications independent    Meal Preparation independent    Housekeeping independent    Laundry independent    Shopping independent       Mental Status    General Appearance WDL WDL       Mental Status Summary    Recent Changes in Mental Status/Cognitive Functioning no changes             Yanique Seo RN    Office Phone [phone]  Office Cell [phone]      Case Management Readmission Assessment Note    Case Management Readmission Assessment (all recorded)       Readmission Interview       Row Name 07/25/24 1417             Readmission Indications    Is the patient and/or family able to complete the readmission assessment questions? Yes      Is this hospitalization related to the prior hospital diagnosis? No  admititng diagnosis is afib, but had complaints of weakness and fatigue  "that started after gabapentin started by PCP.,        Row Name 07/25/24 1417             Recommendation for rehospitalization    Did you speak with your physician prior to coming to the hospital No      If yes, what physician did you speak with? HH PTA saw 7/24, Patient anxious, confused, memory loss, stated \"felt out of her head\" Manual .  /106.  Called EMS.        Row Name 07/25/24 1417             Follow-up Appointments    Do you have a PCP? Yes      Did you have an appointment with PCP after your hospitalization? Yes      When was your appointment scheduled? 07/17/24  On AVS for Aug 1, but after DC appt changed to 7/17. Patient went to approintment, has another appt scheduled for 8/1.      Did you go to appointment? Yes      Did you have an appointment with a Specialist? Yes  FU with cardio in 3 weeks/ 1 month.  Pacer check scheduled 8/13.      Did you go to appointment? --  readmitted prior to      Are you current with the Pulmonary Clinic? No      Are you current with the CHF Clinic? No        Row Name 07/25/24 1417             Medications    Did you have newly prescribed medications at discharge? Yes  Eliquis- cost checked at $31.80 last visit. Picked up.  PO amio- shows not picked up with meds to beds, sent to [facility].      Did you understand the reasons for your medications at discharge and how to take them? Yes      Did you understand the side effects of your medications? Yes      Are you taking all of you prescribed medications? Yes      What pharmacy was used to fill prescription(s)? [facility]      Were medications picked up? Yes        Row Name 07/25/24 1417             Discharge Instructions    Did you understand your discharge instructions? Yes      Did your family/caregiver hear your instructions? Yes      Were you told to eat a special diet? Yes      Did you adhere to the diet? Yes        Row Name 07/25/24 1417             Index discharge location/services    Where did you " go upon discharge? Home with services      Do you have supportive family or friends in the home? No      What services were arranged at discharge? Home Health      Home Health Service used? [facility]       Have you had a Home Health visit since discharge? Yes        Row Name 07/25/24 1417             Discharge Readiness    On a scale of 1-5 (5 being well prepared), how ready were you for discharge 5        Row Name 07/25/24 1417             Palliative Care/Hospice    Are you current with Palliative Care? No      Are you current with Hospice Care? No        Row Name 07/25/24 1417 07/24/24 5488          Advance Directives (For Healthcare)    Pre-existing AND/MOST/POLST Order No No     Advance Directive Status Patient has advance directive, copy in chart Patient has advance directive, copy in chart     Type of Advance Directive Durable power of  for health care Durable power of  for health care     Have you reviewed your Advance Directive and is it valid for this stay? Yes Yes     Literature Provided on Advance Directives No No     Patient Requests Assistance on Advance Directives Patient Declined Patient Declined       Row Name 07/25/24 1417             Readmission Assessment Final Comments    Final Comments Last Admission 7/5-7/11 Came in after fall at home.  Dx afib rvr. Started on amiodarone gtt which was changed to oral 7/8.  Recieved IV lasix. Also had back pain, ARIELA, r shoulder pain.   7/10 EGD, colonoscopy, cardioversion.   PT initial recc for SNF, did not want to go to SNF 2/2 no one to care for dog for time needed at SNF.  PT recc changed to home with .   HH set up with [facility].   Did not have $$ with her to  med[facility], [facility] to [facility].   Went home with Eliquis and Amiodarone (note that  AVS medication list and medication list on Discharge summary have some differences including Eliquis and Amiodarone).   After discharge, patient saw PCP 7/17, who prescribed  "Gabapentin.   7/23 Per  OT called and spoke with Tonya with PCP office.  Pulse was out of paremeters. NP [facility]. 7/23 HH PT  7/23  OT charted Oriented x4, friend kassidy at visit.   skilled nursing saw 7/18, 7/20, 7/22.   PTA saw 7/24, Patient anxious, confused, memory loss, stated \"felt out of her head\" Manual . /106. Called EMS.  This admission: 7/24- C/O Weakness and fatigue.  Dx Afib.   Amio oral.  Cardizem gtt.  Cardio consulted.                      "

## 2024-07-25 NOTE — DISCHARGE PLACEMENT REQUEST
"Catalina Carrera (84 y.o. Female)       Date of Birth   1940    Social Security Number       Address   [address] [place] [address] [place] [facility]    Home Phone   [phone]    MRN   [id number]       Latter-day   None    Marital Status                               Admission Date   7/24/24    Admission Type   Emergency    Admitting Provider   Monalisa Lopes MD    Attending Provider   Gaston Lopez MD    Department, Room/Bed   Psychiatric, 2107/1       Discharge Date       Discharge Disposition       Discharge Destination                                 Attending Provider: Gaston Lopez MD    Allergies: Baclofen, Codeine, Ibuprofen, Methocarbamol, Naproxen, Tizanidine Hcl, Tolmetin    Isolation: None   Infection: None   Code Status: Prior    Ht: 160 cm (62.99\")   Wt: 72.8 kg (160 lb 7.9 oz)    Admission Cmt: None   Principal Problem: Atrial fibrillation [I48.91]                   Active Insurance as of 7/24/2024       Primary Coverage       Payor Plan Insurance Group Employer/Plan Group    MEDICARE MEDICARE A & B        Payor Plan Address Payor Plan Phone Number Payor Plan Fax Number Effective Dates    [address] [phone]  4/1/2006 - None Entered    [facility] [id number]         Subscriber Name Subscriber Birth Date Member ID       CATALINA CARRERA 1940 [id number]               Secondary Coverage       Payor Plan Insurance Group Employer/Plan Group    ANTHEM BLUE CROSS ANTHEM BLUE CROSS BLUE SHIELD PPO [id number]       Payor Plan Address Payor Plan Phone Number Payor Plan Fax Number Effective Dates    [address] [phone]  2/2/2022 - None Entered    [facility] [address]         Subscriber Name Subscriber Birth Date Member ID       CATALINA CARRERA 1940 [id number]               Tertiary Coverage       Payor Plan Insurance Group Employer/Plan Group    [place]        Payor Plan Address Payor Plan Phone Number Payor Plan " Fax Number Effective Dates    [address]   2022 - None Entered    [place] [address]         Subscriber Name Subscriber Birth Date Member ID       CATALINA CARRERA 1940 [id number]                     Emergency Contacts        (Rel.) Home Phone Work Phone Mobile Phone    MADHAV DENA (Son) [phone] -- [phone]    aramis dean (Son) -- -- [phone]    Shwetha Eldridge (Neighbor) [phone] -- --                 History & Physical        Monalisa Lopes MD at 24 2201            History and Physical   Catalina Carrera : 1940 MRN:[id number] LOS:0     Reason for admission: Atrial fibrillation     Assessment / Plan     #Generalized weakness  #A fib with RVR  -presented with generalizied weakness   -RVP pending   -CXR and UA fine   -echo in  with EF of 46-50%   -A fib with RVR in the ED and now on cardizem drip  -eliquis resumed   -cardio consult   -tele to continue   -PT OT to see     Neuropathic pain/RLS  -on gabapentin         Nutrition: Diet: Regular/House; Fluid Consistency: Thin (IDDSI 0)     DVT Prophylaxis: [unfilled]     History of Present illness     A 84 y.o. old female patient with PMH of PAF CAD presents to the hospital with complaints of general weakness and palpitation.In the Ed, she is with A fib with RVR and started on cardizem drip. She is on amiodarone and eliquis at home. She is also using gabapentin for the nerve pain. CXR CT head and labs seems to be fairly stable.     Admitted for a fib with RVR.       Subjective / Review of systems     Review of Systems   No cp     Past Medical/Surgical/Social/Family History & Allergies     Past Medical History:   Diagnosis Date    Acquired spondylolisthesis of lumbosacral region     Acute kidney injury 2022    Anxiety     Arthritis     Atrial fibrillation     paroxysmal    Broken shoulder     left-- due to fall 20 was at [facility]    Chronic pain disorder     Chronic systolic CHF (congestive heart  failure) 01/05/2023    EF 36-40%    Coronary artery disease involving native coronary artery of native heart without angina pectoris 04/02/2021    nonobstructive    DDD (degenerative disc disease), cervical     DDD (degenerative disc disease), lumbar     Depression     Edema     9/2020 foot    GERD (gastroesophageal reflux disease)     H/O fall     Heart failure with mid-range ejection fraction 03/05/2022    EF 45% per 2D echo    Hypertension     Hypothyroidism     Insomnia     Low back pain     Moderate mitral regurgitation 01/05/2023    Neuropathy     Nonrheumatic tricuspid valve regurgitation     Pain in both feet     Polypharmacy     PONV (postoperative nausea and vomiting)     Pulmonary hypertension     Radiculopathy     Restless legs     Sacroiliitis     Sick sinus syndrome     Added automatically from request for surgery [id number]    Spondylolisthesis     Stage 3a chronic kidney disease     Urinary incontinence     Vitamin D deficiency       Past Surgical History:   Procedure Laterality Date    BACK SURGERY  07/19/2018    PDC &  PSF L3-L5 insitu    CARDIAC CATHETERIZATION N/A 4/2/2021    Procedure: Cardiac Catheterization/Vascular Study;  Surgeon: Barrett Evans MD;  Location: [facility] CATH INVASIVE LOCATION;  Service: Cardiovascular;  Laterality: N/A;    CARDIAC ELECTROPHYSIOLOGY PROCEDURE N/A 1/17/2023    Procedure: Pacemaker DC new;  Surgeon: Baljeet Valero MD;  Location: [facility] CATH INVASIVE LOCATION;  Service: Cardiovascular;  Laterality: N/A;    CHOLECYSTECTOMY      COLONOSCOPY      COLONOSCOPY N/A 7/10/2024    Procedure: COLONOSCOPY with cold snare polypectomy x1 and endoscopic clipping x2;  Surgeon: MARLIN Vanegas MD;  Location: [facility] ENDOSCOPY;  Service: Gastroenterology;  Laterality: N/A;  Post- colon polyps, diverticulosis    ENDOSCOPY N/A 9/14/2023    Procedure: ESOPHAGOGASTRODUODENOSCOPY;  Surgeon: Ulysses Lamas MD;  Location: [facility] ENDOSCOPY;  Service:  Gastroenterology;  Laterality: N/A;  gastritis, inflammatory gastric polyp    ENDOSCOPY N/A 7/10/2024    Procedure: ESOPHAGOGASTRODUODENOSCOPY;  Surgeon: MARLIN Vanegas MD;  Location: [facility] ENDOSCOPY;  Service: Gastroenterology;  Laterality: N/A;  Post- hiatal hernia, gastric polyps, gastritis    HYSTERECTOMY      ROTATOR CUFF REPAIR Left       Social History     Socioeconomic History    Marital status:    Tobacco Use    Smoking status: Never     Passive exposure: Never    Smokeless tobacco: Never   Vaping Use    Vaping status: Never Used   Substance and Sexual Activity    Alcohol use: No    Drug use: Never    Sexual activity: Defer      Family History   Problem Relation Age of Onset    Cancer Father     Diabetes Son     Cancer Paternal Aunt     Heart disease Paternal Aunt     Cancer Paternal Uncle       Allergies   Allergen Reactions    Baclofen Rash    Codeine Nausea Only    Ibuprofen Unknown (See Comments)     Patient doesn't know----Motin    Methocarbamol Unknown (See Comments)     Patient doesn't know    Naproxen Unknown (See Comments)     Pt. Doesn't know     Tizanidine Hcl Other (See Comments)     Syncope     Tolmetin Dizziness     Pt. Doesn't know-- same as Tolectin        Home Medications     Prior to Admission medications    Medication Sig Start Date End Date Taking? Authorizing Provider   apixaban (ELIQUIS) 5 MG tablet tablet Take 1 tablet by mouth Every 12 (Twelve) Hours. Indications: Atrial Fibrillation 7/11/24  Yes Zuleima Canales MD   gabapentin (NEURONTIN) 100 MG capsule Take 1 capsule by mouth Every Night. For pain 7/17/24  Yes Liv Duron APRN   amiodarone (PACERONE) 200 MG tablet Take 1 tablet by mouth Every 8 (Eight) Hours for 12 days, THEN 1 tablet Every 12 (Twelve) Hours for 14 days, THEN 1 tablet Daily for 30 days. 7/11/24 9/5/24  Zuleima Canales MD   cyclobenzaprine (FLEXERIL) 5 MG tablet Take 1 tablet by mouth 2 (Two) Times a Day As Needed for Muscle Spasms.     Provider, MD Elton   ferrous gluconate (FERGON) 324 MG tablet Take 1 tablet by mouth Every Other Day for 60 days. 7/11/24 9/9/24  Zuleima Canales MD   melatonin 3 MG tablet Take 1 tablet by mouth Every Night.    Provider, MD Elton   metoprolol succinate XL (TOPROL-XL) 50 MG 24 hr tablet Take 1 tablet by mouth Daily. For Afib/Hypertension 7/17/24   Liv Duron APRN   spironolactone (Aldactone) 25 MG tablet Take 1 tablet by mouth Daily. For swelling/CHF 7/17/24   Liv Duron APRN   pantoprazole (PROTONIX) 40 MG EC tablet TAKE 1 TABLET BY MOUTH EVERY DAY 7/18/24 7/24/24  Liv Duron APRN      Objective / Physical Exam   Vital signs:  Temp: 98.7 °F (37.1 °C)  BP: 121/70  Heart Rate: 89  Resp: 15  SpO2: 93 %  Weight: 77 kg (169 lb 12.1 oz)    Admission Weight: Weight: 77 kg (169 lb 12.1 oz)    Physical Exam   Physical Exam  HENT:      Head: Normocephalic and atraumatic.      Nose: Nose normal.   Eyes:      Extraocular Movements: Extraocular movements intact.      Conjunctivae/sclera: Conjunctivae normal.      Pupils: Pupils are equal, round, and reactive to light.   Cardiovascular:   IRIR   Pulmonary:      Effort: normal      Breath sounds: normal   Abdominal:      General: Abdomen is flat. Bowel sounds are normal.      Palpations: Abdomen is soft.   Musculoskeletal:         General: Normal range of motion.      Cervical back: Normal range of motion and neck supple.   Skin:     General: Skin is dry.   Neurological:      General: No focal deficit present.      Mental Status: alert.   Psychiatric:         Mood and Affect: Mood normal.        Labs     Results from last 7 days   Lab Units 07/24/24  1445   WBC 10*3/mm3 7.16   HEMATOCRIT % 38.2   PLATELETS 10*3/mm3 322      Results from last 7 days   Lab Units 07/24/24  1614   SODIUM mmol/L 137   POTASSIUM mmol/L 5.0   CHLORIDE mmol/L 100   CO2 mmol/L 26.9   BUN mg/dL 23   CREATININE mg/dL 0.89        Current Medications   Scheduled  Meds:apixaban, 5 mg, Oral, Q12H  [START ON 7/25/2024] ferrous sulfate, 324 mg, Oral, Daily With Breakfast  gabapentin, 100 mg, Oral, Nightly  [START ON 7/25/2024] metoprolol succinate XL, 50 mg, Oral, Daily  sodium chloride, 10 mL, Intravenous, Q12H         Continuous Infusions:dilTIAZem, 5-15 mg/hr, Last Rate: 10 mg/hr (07/24/24 1930)         Monalisa Lopes MD  [facility]   07/24/24   22:01 EDT         Electronically signed by Monalisa Lopes MD at 07/24/24 2220       Physician Progress Notes (last 24 hours)  Notes from 07/24/24 1033 through 07/25/24 1033   No notes of this type exist for this encounter.       Consult Notes (last 24 hours)  Notes from 07/24/24 1033 through 07/25/24 1033   No notes of this type exist for this encounter.       Physical Therapy Notes (last 24 hours)  Notes from 07/24/24 1033 through 07/25/24 1033   No notes exist for this encounter.       Occupational Therapy Notes (last 24 hours)  Notes from 07/24/24 1033 through 07/25/24 1033   No notes exist for this encounter.

## 2024-07-25 NOTE — THERAPY EVALUATION
Patient Name: Catalina Moreno  : 1940    MRN: [id number]                              Today's Date: 2024       Admit Date: 2024    Visit Dx:     ICD-10-CM ICD-9-CM   1. Atrial fibrillation with RVR  I48.91 427.31   2. Generalized weakness  R53.1 780.79     Patient Active Problem List   Diagnosis    Arthritis    Depressive disorder    Primary fibromyalgia syndrome    Hypothyroidism    Atrial fibrillation with RVR    Stage 3a chronic kidney disease    Chronic low back pain    Age-related cognitive decline    Generalized anxiety disorder    Polyneuropathy, unspecified    Restless legs syndrome    Paroxysmal atrial fibrillation    Essential (primary) hypertension    Gastro-esophageal reflux disease without esophagitis    Pulmonary hypertension, unspecified    Coronary artery disease involving native coronary artery of native heart without angina pectoris    Presence of cardiac pacemaker    Chronic HFrEF (heart failure with reduced ejection fraction)    Atrial flutter with rapid ventricular response    Weakness    AF (atrial fibrillation)    Chronic heart failure with preserved ejection fraction (HFpEF)    Fall    Nasal bones, closed fracture    Iron deficiency anemia    Atrial fibrillation     Past Medical History:   Diagnosis Date    Acquired spondylolisthesis of lumbosacral region     Acute kidney injury 2022    Anxiety     Arthritis     Atrial fibrillation     paroxysmal    Broken shoulder     left-- due to fall 20 was at [facility]    Chronic pain disorder     Chronic systolic CHF (congestive heart failure) 2023    EF 36-40%    Coronary artery disease involving native coronary artery of native heart without angina pectoris 2021    nonobstructive    DDD (degenerative disc disease), cervical     DDD (degenerative disc disease), lumbar     Depression     Edema     2020 foot    GERD (gastroesophageal reflux disease)     H/O fall     Heart failure with mid-range ejection fraction  03/05/2022    EF 45% per 2D echo    Hypertension     Hypothyroidism     Insomnia     Low back pain     Moderate mitral regurgitation 01/05/2023    Neuropathy     Nonrheumatic tricuspid valve regurgitation     Pain in both feet     Polypharmacy     PONV (postoperative nausea and vomiting)     Pulmonary hypertension     Radiculopathy     Restless legs     Sacroiliitis     Sick sinus syndrome     Added automatically from request for surgery [id number]    Spondylolisthesis     Stage 3a chronic kidney disease     Urinary incontinence     Vitamin D deficiency      Past Surgical History:   Procedure Laterality Date    BACK SURGERY  07/19/2018    PDC &  PSF L3-L5 insitu    CARDIAC CATHETERIZATION N/A 4/2/2021    Procedure: Cardiac Catheterization/Vascular Study;  Surgeon: Barrett Evans MD;  Location: [facility] CATH INVASIVE LOCATION;  Service: Cardiovascular;  Laterality: N/A;    CARDIAC ELECTROPHYSIOLOGY PROCEDURE N/A 1/17/2023    Procedure: Pacemaker DC new;  Surgeon: Baljeet Valero MD;  Location: [facility] CATH INVASIVE LOCATION;  Service: Cardiovascular;  Laterality: N/A;    CHOLECYSTECTOMY      COLONOSCOPY      COLONOSCOPY N/A 7/10/2024    Procedure: COLONOSCOPY with cold snare polypectomy x1 and endoscopic clipping x2;  Surgeon: MARLIN Vanegas MD;  Location: [facility] ENDOSCOPY;  Service: Gastroenterology;  Laterality: N/A;  Post- colon polyps, diverticulosis    ENDOSCOPY N/A 9/14/2023    Procedure: ESOPHAGOGASTRODUODENOSCOPY;  Surgeon: Ulysses Lamas MD;  Location: [facility] ENDOSCOPY;  Service: Gastroenterology;  Laterality: N/A;  gastritis, inflammatory gastric polyp    ENDOSCOPY N/A 7/10/2024    Procedure: ESOPHAGOGASTRODUODENOSCOPY;  Surgeon: MARLIN Vanegas MD;  Location: [facility] ENDOSCOPY;  Service: Gastroenterology;  Laterality: N/A;  Post- hiatal hernia, gastric polyps, gastritis    HYSTERECTOMY      ROTATOR CUFF REPAIR Left       General Information       Row Name 07/25/24 2251           Physical Therapy Time and Intention    Document Type evaluation  -JV     Mode of Treatment physical therapy  -JV       Row Name 07/25/24 1318          General Information    Patient Profile Reviewed yes  -JV     Prior Level of Function independent:;all household mobility;community mobility;ADL's;driving  with Rwx  -JV     Existing Precautions/Restrictions fall;oxygen therapy device and L/min;orthostatic hypotension  -JV     Barriers to Rehab medically complex;previous functional deficit;environmental barriers  limited social support  -JV       Row Name 07/25/24 1318          Living Environment    People in Home alone  -JV       Row Name 07/25/24 1318          Home Main Entrance    Number of Stairs, Main Entrance one  -[facility] Name 07/25/24 1318          Stairs Within Home, Primary    Number of Stairs, Within Home, Primary none  -[facility] Name 07/25/24 1318          Cognition    Orientation Status (Cognition) oriented x 4  -[facility] Name 07/25/24 1318          Safety Issues, Functional Mobility    Impairments Affecting Function (Mobility) balance;endurance/activity tolerance  -               User Key  (r) = Recorded By, (t) = Taken By, (c) = Cosigned By      Initials Name Provider Type    J Rosio Fam Physical Therapist                   Mobility       Row Name 07/25/24 1324          Bed Mobility    Bed Mobility supine-sit  -J     Supine-Sit [place] (Bed Mobility) contact guard;verbal cues  -     Assistive Device (Bed Mobility) bed rails;head of bed elevated  -       Row Name 07/25/24 1324          Bed-Chair Transfer    Bed-Chair [place] (Transfers) minimum assist (75% patient effort);verbal cues;nonverbal cues (demo/gesture)  -     Assistive Device (Bed-Chair Transfers) walker, front-wheeled  -       Row Name 07/25/24 1324          Sit-Stand Transfer    Sit-Stand [place] (Transfers) verbal cues;nonverbal cues (demo/gesture);minimum assist (75% patient effort)  -      Assistive Device (Sit-Stand Transfers) walker, front-wheeled  -JV     Comment, (Sit-Stand Transfer) CGA from elevated surfaces; MIN A from standard commode  -JV       Row Name 07/25/24 1324          Gait/Stairs (Locomotion)    [place] Level (Gait) verbal cues;nonverbal cues (demo/gesture);contact guard  -JV     Assistive Device (Gait) walker, front-wheeled  -JV     Patient was able to Ambulate yes  -JV     Distance in Feet (Gait) 80  -JV     Deviations/Abnormal Patterns (Gait) chad decreased;gait speed decreased  -JV     Bilateral Gait Deviations forward flexed posture  -JV     Comment, (Gait/Stairs) Pt with s/s hypotension post gait.  -JV               User Key  (r) = Recorded By, (t) = Taken By, (c) = Cosigned By      Initials Name Provider Type    Rosio Ribeiro Physical Therapist                   Obj/Interventions       Row Name 07/25/24 1327          Range of Motion Comprehensive    General Range of Motion bilateral lower extremity ROM WFL  -JV       Row Name 07/25/24 1327          Strength Comprehensive (MMT)    Comment, General Manual Muscle Testing (MMT) Assessment BLE grossly 4/5  -JV       Row Name 07/25/24 1327          Balance    Balance Assessment standing static balance;standing dynamic balance  -JV     Static Standing Balance contact guard  -JV     Dynamic Standing Balance minimal assist  -JV     Position/Device Used, Standing Balance supported;walker, rolling  -JV       Row Name 07/25/24 1327          Sensory Assessment (Somatosensory)    Sensory Assessment (Somatosensory) LE sensation intact;other (see comments)  exception ELIZABETH feet numb  -JV               User Key  (r) = Recorded By, (t) = Taken By, (c) = Cosigned By      Initials Name Provider Type    Rosio Ribeiro Physical Therapist                   Goals/Plan       Row Name 07/25/24 1336          Bed Mobility Goal 1 (PT)    Activity/Assistive Device (Bed Mobility Goal 1, PT) bed mobility activities, all  -JV      [place] Level/Cues Needed (Bed Mobility Goal 1, PT) modified independence  -JV     Time Frame (Bed Mobility Goal 1, PT) long term goal (LTG);2 weeks  -JV       Row Name 07/25/24 1336          Transfer Goal 1 (PT)    Activity/Assistive Device (Transfer Goal 1, PT) sit-to-stand/stand-to-sit;bed-to-chair/chair-to-bed;walker, rolling  -JV     [place] Level/Cues Needed (Transfer Goal 1, PT) modified independence  -JV     Time Frame (Transfer Goal 1, PT) long term goal (LTG);2 weeks  -JV       Row Name 07/25/24 1336          Gait Training Goal 1 (PT)    Activity/Assistive Device (Gait Training Goal 1, PT) gait (walking locomotion);assistive device use;walker, rolling  -JV     [place] Level (Gait Training Goal 1, PT) modified independence  -JV     Distance (Gait Training Goal 1, PT) 100 ft  -JV     Time Frame (Gait Training Goal 1, PT) long term goal (LTG);2 weeks  -JV       Row Name 07/25/24 1335          Therapy Assessment/Plan (PT)    Planned Therapy Interventions (PT) balance training;bed mobility training;gait training;home exercise program;patient/family education;strengthening;transfer training  -JV               User Key  (r) = Recorded By, (t) = Taken By, (c) = Cosigned By      Initials Name Provider Type    Rosio Ribeiro Physical Therapist                   Clinical Impression       Row Name 07/25/24 1327          Pain    Pretreatment Pain Rating 8/10  -JV     Posttreatment Pain Rating 8/10  -JV     Pain Location - Side/Orientation Bilateral  -JV     Pain Location lower  -JV     Pain Location - extremity  -JV       Row Name 07/25/24 1327          Plan of Care Review    Outcome Evaluation Pt is an 85 y/o female presenting to [facility] on 7/24 with c/o generalized weakness and palpitations. In ED, pt with A fib with RVR and started on cardizem drip. 7/24: chest XR and CT head (-) acute changes.   PMH of PAF, CAD. At time of therapy eval, pt A&Ox4; pt reports she lives alone in [facility] with one step to  "enter; reports PLOF of Mod(I) with RWx for household/community mobility/ambulation. Pt uses shower chair. She reports she is still driving and able to walk within grocery store.   Pt reports fall and hitting her head within last two months. Pt with limited social support; her neighbor reports concern that pt with increased confusion including walking in street and needing increased support.   New reliance on supplemental O2(2L NC when with therapy entered room, SaO2 93%).  Supine /67 mmHg, pulse 72 bpm.   Sitting /63 mmHg,; Pt with s/s hypotension post gait -- standing BP post gait 97/55 mmHg, pulse 72-86 bpm, SaO2 94% with room air.   BLE pain 8/10, but reports it is improved since last night. At time of therapy eval, pt completes bed mobility with CGA, requires MIN A with sit to stand with Rwx from stanrdard height surfaces, and ambulates x80 ft with Rwx and CGA, but does report feeling weak and \"funny headed\" - orthostatic hypotension noted. Pt reuqires increased time to complete transfer to/from commode. Due to functional decline, environmental barriers, fall risk / decreased safety awareness, and limited social support, follow-up SNF recommended at time of d/c from [facility].  -JV       Row Name 07/25/24 1327          Therapy Assessment/Plan (PT)    Criteria for Skilled Interventions Met (PT) yes;meets criteria;skilled treatment is necessary  -JV     Therapy Frequency (PT) 5 times/wk  -JV     Predicted Duration of Therapy Intervention (PT) until d/c  -JV       Row Name 07/25/24 1327          Vital Signs    Pre Systolic BP Rehab 123  -JV     Pre Treatment Diastolic BP 67  -JV     Intra Systolic BP Rehab 118  -JV     Intra Treatment Diastolic BP 63  -JV     Post Systolic BP Rehab 97  -JV     Post Treatment Diastolic BP 55  -JV     Intratreatment Heart Rate (beats/min) 72  -JV     Posttreatment Heart Rate (beats/min) 86  -JV     Pre Patient Position Supine  -JV     Intra Patient Position Sitting  -JV     " Post Patient Position Standing  -JV       Row Name 07/25/24 1327          Positioning and Restraints    Pre-Treatment Position in bed  -JV     Post Treatment Position chair  -JV     In Chair notified nsg;reclined;call light within reach;encouraged to call for assist;exit alarm on  -JV               User Key  (r) = Recorded By, (t) = Taken By, (c) = Cosigned By      Initials Name Provider Type    Rosio Ribeiro Physical Therapist                   Outcome Measures       Row Name 07/25/24 0800          How much help from another person do you currently need...    Turning from your back to your side while in flat bed without using bedrails? 4  -CL     Moving from lying on back to sitting on the side of a flat bed without bedrails? 4  -CL     Moving to and from a bed to a chair (including a wheelchair)? 3  -CL     Standing up from a chair using your arms (e.g., wheelchair, bedside chair)? 3  -CL     Climbing 3-5 steps with a railing? 3  -CL     To walk in hospital room? 3  -CL     AM-PAC 6 Clicks Score (PT) 20  -CL     Highest Level of Mobility Goal 6 --> Walk 10 steps or more  -CL               User Key  (r) = Recorded By, (t) = Taken By, (c) = Cosigned By      Initials Name Provider Type    CL Tierra Mello, RN Registered Nurse                                 Physical Therapy Education       Title: PT OT SLP Therapies (Done)       Topic: Physical Therapy (Done)       Point: Mobility training (Done)       Learning Progress Summary             Patient Acceptance, E,TB, VU by  at 7/25/2024 1337                                         User Key       Initials Effective Dates Name Provider Type Discipline     03/30/21 -  Rosio Fam Physical Therapist PT                  PT Recommendation and Plan  Planned Therapy Interventions (PT): balance training, bed mobility training, gait training, home exercise program, patient/family education, strengthening, transfer training  Outcome Evaluation: Pt is an 84  "y/o female presenting to [facility] on 7/24 with c/o generalized weakness and palpitations. In ED, pt with A fib with RVR and started on cardizem drip. 7/24: chest XR and CT head (-) acute changes.   PMH of PAF, CAD. At time of therapy eval, pt A&Ox4; pt reports she lives alone in [facility] with one step to enter; reports PLOF of Mod(I) with RWx for household/community mobility/ambulation. Pt uses shower chair. She reports she is still driving and able to walk within grocery store.   Pt reports fall and hitting her head within last two months. Pt with limited social support; her neighbor reports concern that pt with increased confusion including walking in street and needing increased support.   New reliance on supplemental O2(2L NC when with therapy entered room, SaO2 93%).  Supine /67 mmHg, pulse 72 bpm.   Sitting /63 mmHg,; Pt with s/s hypotension post gait -- standing BP post gait 97/55 mmHg, pulse 72-86 bpm, SaO2 94% with room air.   BLE pain 8/10, but reports it is improved since last night. At time of therapy eval, pt completes bed mobility with CGA, requires MIN A with sit to stand with Rwx from stanrdard height surfaces, and ambulates x80 ft with Rwx and CGA, but does report feeling weak and \"funny headed\" - orthostatic hypotension noted. Pt reuqires increased time to complete transfer to/from commode. Due to functional decline, environmental barriers, fall risk / decreased safety awareness, and limited social support, follow-up SNF recommended at time of d/c from [facility].     Time Calculation:         PT Charges       Row Name 07/25/24 4754             Time Calculation    Start Time 1031  -JV      Stop Time 1110  -JV      Time Calculation (min) 39 min  -JV      PT Received On 07/25/24  -JV      PT - Next Appointment 07/26/24  -JV      PT Goal Re-Cert Due Date 08/08/24  -JV         Time Calculation- PT    Total Timed Code Minutes- PT 10 minute(s)  -JV                User Key  (r) = Recorded By, (t) = Taken By, " (c) = Cosigned By      Initials Name Provider Type    Rosio Ribeiro Physical Therapist                  Therapy Charges for Today       Code Description Service Date Service Provider Modifiers Qty    72948865384 HC PT EVAL MOD COMPLEXITY 4 7/25/2024 Rosio Fam GP 1    65055438689 HC PT THERAPEUTIC ACT EA 15 MIN 7/25/2024 Rosio Fam GP 1            PT G-Codes  AM-PAC 6 Clicks Score (PT): 20  PT Discharge Summary  Anticipated Discharge Disposition (PT): skilled nursing facility    Rosio Fam  7/25/2024

## 2024-07-25 NOTE — PLAN OF CARE
"Goal Outcome Evaluation:              Outcome Evaluation: Pt is an 83 y/o female presenting to [facility] on 7/24 with c/o generalized weakness and palpitations. In ED, pt with A fib with RVR and started on cardizem drip. 7/24: chest XR and CT head (-) acute changes.   PMH of PAF, CAD. At time of therapy eval, pt A&Ox4; pt reports she lives alone in [facility] with one step to enter; reports PLOF of Mod(I) with RWx for household/community mobility/ambulation. Pt uses shower chair. She reports she is still driving and able to walk within grocery store.   Pt reports fall and hitting her head within last two months. Pt with limited social support; her neighbor reports concern that pt with increased confusion including walking in street and needing increased support.   New reliance on supplemental O2(2L NC when with therapy entered room, SaO2 93%).  Supine /67 mmHg, pulse 72 bpm.   Sitting /63 mmHg,; Pt with s/s hypotension post gait -- standing BP post gait 97/55 mmHg, pulse 72-86 bpm, SaO2 94% with room air.   BLE pain 8/10, but reports it is improved since last night. At time of therapy eval, pt completes bed mobility with CGA, requires MIN A with sit to stand with Rwx from stanrdard height surfaces, and ambulates x80 ft with Rwx and CGA, but does report feeling weak and \"funny headed\" - orthostatic hypotension noted. Pt reuqires increased time to complete transfer to/from commode. Due to functional decline, environmental barriers, fall risk / decreased safety awareness, and limited social support, follow-up SNF recommended at time of d/c from [facility].      Anticipated Discharge Disposition (PT): skilled nursing facility                        "

## 2024-07-25 NOTE — PLAN OF CARE
Goal Outcome Evaluation:  Plan of Care Reviewed With: patient           Outcome Evaluation: Patient has been in afib/paced rhythm with rate in the 70-80s. Patient was on cardizem drip this am and that has been stopped at 1205. Patient started on po amiodarone. Patient up in the chair today for several hours. PT worked with patient today and patient orthostatic when standing up at 97/55. BP back to 131/57 when sitting back down. Patient received po oxy this am for bilateral lower leg cramps and aching. No new complaints.

## 2024-07-25 NOTE — CONSULTS
CARDIOLOGY CONSULT:    Catalina Moreno  1940  female  [id number]      Referring Provider: Hospitalist  Reason for Consultation: Weakness and palpitations    Patient Care Team:  Liv Duron APRN as PCP - General (Nurse Practitioner)  Flash Gonzalez MD as Consulting Physician (Cardiology)  Shefali Worthy MD as Consulting Physician (Pain Medicine)    Chief complaint weakness and palpitations    Subjective .     History of present illness:  Catalina Moreno is a 84 y.o. female with history of paroxysmal fibrillation coronary disease hypertension hyperlipidemia presents to the hospital with complaints of generalized weakness and palpitations.  No complaint of any chest pain or shortness of breath now.  No PND or orthopnea.  No dizziness syncope or swelling of the feet.  Patient has been taking her medicines regularly.  In the ER patient had EKG which showed atrial fibrillation with rapid response and hence she was started on Cardizem drip.  She was taking the Eliquis and amiodarone at home.    Review of Systems   Constitutional: Negative for fever and malaise/fatigue.   HENT:  Negative for ear pain and nosebleeds.    Eyes:  Negative for blurred vision and double vision.   Cardiovascular:  Positive for palpitations. Negative for chest pain and dyspnea on exertion.   Respiratory:  Negative for cough and shortness of breath.    Skin:  Negative for rash.   Musculoskeletal:  Negative for joint pain.   Gastrointestinal:  Negative for abdominal pain, nausea and vomiting.   Neurological:  Negative for focal weakness and headaches.   Psychiatric/Behavioral:  Negative for depression. The patient is not nervous/anxious.    All other systems reviewed and are negative.      History  Past Medical History:   Diagnosis Date    Acquired spondylolisthesis of lumbosacral region     Acute kidney injury 07/22/2022    Anxiety     Arthritis     Atrial fibrillation     paroxysmal    Broken shoulder     left-- due to fall  11-7-19 was at [facility]    Chronic pain disorder     Chronic systolic CHF (congestive heart failure) 01/05/2023    EF 36-40%    Coronary artery disease involving native coronary artery of native heart without angina pectoris 04/02/2021    nonobstructive    DDD (degenerative disc disease), cervical     DDD (degenerative disc disease), lumbar     Depression     Edema     9/2020 foot    GERD (gastroesophageal reflux disease)     H/O fall     Heart failure with mid-range ejection fraction 03/05/2022    EF 45% per 2D echo    Hypertension     Hypothyroidism     Insomnia     Low back pain     Moderate mitral regurgitation 01/05/2023    Neuropathy     Nonrheumatic tricuspid valve regurgitation     Pain in both feet     Polypharmacy     PONV (postoperative nausea and vomiting)     Pulmonary hypertension     Radiculopathy     Restless legs     Sacroiliitis     Sick sinus syndrome     Added automatically from request for surgery [id number]    Spondylolisthesis     Stage 3a chronic kidney disease     Urinary incontinence     Vitamin D deficiency        Past Surgical History:   Procedure Laterality Date    BACK SURGERY  07/19/2018    PDC &  PSF L3-L5 insitu    CARDIAC CATHETERIZATION N/A 4/2/2021    Procedure: Cardiac Catheterization/Vascular Study;  Surgeon: Barrett Evans MD;  Location: [facility] CATH INVASIVE LOCATION;  Service: Cardiovascular;  Laterality: N/A;    CARDIAC ELECTROPHYSIOLOGY PROCEDURE N/A 1/17/2023    Procedure: Pacemaker DC new;  Surgeon: Baljeet Valero MD;  Location: [facility] CATH INVASIVE LOCATION;  Service: Cardiovascular;  Laterality: N/A;    CHOLECYSTECTOMY      COLONOSCOPY      COLONOSCOPY N/A 7/10/2024    Procedure: COLONOSCOPY with cold snare polypectomy x1 and endoscopic clipping x2;  Surgeon: MARLIN Vanegas MD;  Location: [facility] ENDOSCOPY;  Service: Gastroenterology;  Laterality: N/A;  Post- colon polyps, diverticulosis    ENDOSCOPY N/A 9/14/2023    Procedure: ESOPHAGOGASTRODUODENOSCOPY;   Surgeon: Ulysses Lamas MD;  Location: [facility] ENDOSCOPY;  Service: Gastroenterology;  Laterality: N/A;  gastritis, inflammatory gastric polyp    ENDOSCOPY N/A 7/10/2024    Procedure: ESOPHAGOGASTRODUODENOSCOPY;  Surgeon: MARLIN Vanegas MD;  Location: [facility] ENDOSCOPY;  Service: Gastroenterology;  Laterality: N/A;  Post- hiatal hernia, gastric polyps, gastritis    HYSTERECTOMY      ROTATOR CUFF REPAIR Left        Family History   Problem Relation Age of Onset    Cancer Father     Diabetes Son     Cancer Paternal Aunt     Heart disease Paternal Aunt     Cancer Paternal Uncle        Social History     Tobacco Use    Smoking status: Never     Passive exposure: Never    Smokeless tobacco: Never   Vaping Use    Vaping status: Never Used   Substance Use Topics    Alcohol use: No    Drug use: Never        Medications Prior to Admission   Medication Sig Dispense Refill Last Dose    apixaban (ELIQUIS) 5 MG tablet tablet Take 1 tablet by mouth Every 12 (Twelve) Hours. Indications: Atrial Fibrillation 60 tablet 2 7/23/2024    gabapentin (NEURONTIN) 100 MG capsule Take 1 capsule by mouth Every Night. For pain 90 capsule 0 7/23/2024    amiodarone (PACERONE) 200 MG tablet Take 1 tablet by mouth Every 8 (Eight) Hours for 12 days, THEN 1 tablet Every 12 (Twelve) Hours for 14 days, THEN 1 tablet Daily for 30 days. 94 tablet 0     cyclobenzaprine (FLEXERIL) 5 MG tablet Take 1 tablet by mouth 2 (Two) Times a Day As Needed for Muscle Spasms.       ferrous gluconate (FERGON) 324 MG tablet Take 1 tablet by mouth Every Other Day for 60 days. 30 tablet 0     melatonin 3 MG tablet Take 1 tablet by mouth Every Night.       metoprolol succinate XL (TOPROL-XL) 50 MG 24 hr tablet Take 1 tablet by mouth Daily. For Afib/Hypertension 90 tablet 1     spironolactone (Aldactone) 25 MG tablet Take 1 tablet by mouth Daily. For swelling/CHF 90 tablet 1        Allergies: Baclofen, Codeine, Ibuprofen, Methocarbamol, Naproxen, Tizanidine hcl,  "and Tolmetin    Scheduled Meds:amiodarone, 200 mg, Oral, Q12H  apixaban, 5 mg, Oral, Q12H  ferrous sulfate, 324 mg, Oral, Daily With Breakfast  metoprolol succinate XL, 50 mg, Oral, Daily  sodium chloride, 10 mL, Intravenous, Q12H  [START ON 7/26/2024] spironolactone, 25 mg, Oral, Daily      Continuous Infusions:dilTIAZem, 5-15 mg/hr, Last Rate: Stopped (07/25/24 1205)      PRN Meds:.  acetaminophen **OR** acetaminophen **OR** acetaminophen    aluminum-magnesium hydroxide-simethicone    senna-docusate sodium **AND** polyethylene glycol **AND** bisacodyl **AND** bisacodyl    Calcium Replacement - Follow Nurse / BPA Driven Protocol    cyclobenzaprine    Magnesium Cardiology Dose Replacement - Follow Nurse / BPA Driven Protocol    nitroglycerin    ondansetron ODT **OR** ondansetron    oxyCODONE    Phosphorus Replacement - Follow Nurse / BPA Driven Protocol    Potassium Replacement - Follow Nurse / BPA Driven Protocol    [COMPLETED] Insert Peripheral IV **AND** sodium chloride    sodium chloride    sodium chloride    Objective     VITAL SIGNS  Vitals:    07/25/24 1034 07/25/24 1042 07/25/24 1104 07/25/24 1204   BP: 123/67 118/63 131/57 104/70   BP Location: Right arm Right arm Right arm    Patient Position: Sitting Sitting Sitting    Pulse: 78   70   Resp:       Temp:       TempSrc:       SpO2:       Weight:       Height:           Flowsheet Rows      Flowsheet Row First Filed Value   Admission Height 160 cm (63\") Documented at 07/24/2024 1413   Admission Weight 77 kg (169 lb 12.1 oz) Documented at 07/24/2024 1413             TELEMETRY: Atrial fibrillation with controlled ventricular response    Physical Exam:  Constitutional:       Appearance: Well-developed.   Eyes:      General: No scleral icterus.     Conjunctiva/sclera: Conjunctivae normal.      Pupils: Pupils are equal, round, and reactive to light.   HENT:      Head: Normocephalic and atraumatic.   Neck:      Vascular: No carotid bruit or JVD.   Pulmonary:      " Effort: Pulmonary effort is normal.      Breath sounds: Normal breath sounds. No wheezing. No rales.   Cardiovascular:      Normal rate. Irregularly irregular rhythm.      Murmurs: There is a systolic murmur.   Pulses:     Intact distal pulses.   Abdominal:      General: Bowel sounds are normal.      Palpations: Abdomen is soft.   Musculoskeletal: Normal range of motion.      Cervical back: Normal range of motion and neck supple. Skin:     General: Skin is warm and dry.      Findings: No rash.   Neurological:      Mental Status: Alert.      Comments: No focal deficits          Results Review:   I reviewed the patient's new clinical results.  Lab Results (last 24 hours)       Procedure Component Value Units Date/Time    Basic Metabolic Panel [[id number]]  (Abnormal) Collected: 07/25/24 0336    Specimen: Blood Updated: 07/25/24 0436     Glucose 124 mg/dL      BUN 22 mg/dL      Creatinine 0.89 mg/dL      Sodium 138 mmol/L      Potassium 4.3 mmol/L      Comment: Specimen hemolyzed.  Result may be falsely elevated.        Chloride 101 mmol/L      CO2 25.6 mmol/L      Calcium 9.3 mg/dL      BUN/Creatinine Ratio 24.7     Anion Gap 11.4 mmol/L      eGFR 64.4 mL/min/1.73     Narrative:      GFR Normal >60  Chronic Kidney Disease <60  Kidney Failure <15    The GFR formula is only valid for adults with stable renal function between ages 18 and 70.    Magnesium [[id number]]  (Normal) Collected: 07/25/24 0336    Specimen: Blood Updated: 07/25/24 0436     Magnesium 2.0 mg/dL     Phosphorus [[id number]]  (Normal) Collected: 07/25/24 0336    Specimen: Blood Updated: 07/25/24 0425     Phosphorus 4.1 mg/dL     CBC & Differential [[id number]]  (Abnormal) Collected: 07/25/24 0336    Specimen: Blood Updated: 07/25/24 0342    Narrative:      The following orders were created for panel order CBC & Differential.  Procedure                               Abnormality         Status                     ---------                                -----------         ------                     CBC Auto Differential[[id number]]        Abnormal            Final result                 Please view results for these tests on the individual orders.    CBC Auto Differential [[id number]]  (Abnormal) Collected: 07/25/24 0336    Specimen: Blood Updated: 07/25/24 0342     WBC 6.72 10*3/mm3      RBC 4.31 10*6/mm3      Hemoglobin 11.2 g/dL      Hematocrit 36.5 %      MCV 84.7 fL      MCH 26.0 pg      MCHC 30.7 g/dL      RDW 25.2 %      RDW-SD 74.3 fl      MPV 9.4 fL      Platelets 304 10*3/mm3      Neutrophil % 59.5 %      Lymphocyte % 26.5 %      Monocyte % 10.0 %      Eosinophil % 3.0 %      Basophil % 0.7 %      Immature Grans % 0.3 %      Neutrophils, Absolute 4.00 10*3/mm3      Lymphocytes, Absolute 1.78 10*3/mm3      Monocytes, Absolute 0.67 10*3/mm3      Eosinophils, Absolute 0.20 10*3/mm3      Basophils, Absolute 0.05 10*3/mm3      Immature Grans, Absolute 0.02 10*3/mm3      nRBC 0.0 /100 WBC     Magnesium [[id number]]  (Normal) Collected: 07/24/24 1614    Specimen: Blood Updated: 07/24/24 1646     Magnesium 2.1 mg/dL     Comprehensive Metabolic Panel [[id number]]  (Abnormal) Collected: 07/24/24 1614    Specimen: Blood Updated: 07/24/24 1646     Glucose 128 mg/dL      BUN 23 mg/dL      Creatinine 0.89 mg/dL      Sodium 137 mmol/L      Potassium 5.0 mmol/L      Comment: Slight hemolysis detected by analyzer. Result may be falsely elevated.        Chloride 100 mmol/L      CO2 26.9 mmol/L      Calcium 9.6 mg/dL      Total Protein 7.5 g/dL      Albumin 4.3 g/dL      ALT (SGPT) <5 U/L      AST (SGOT) 27 U/L      Comment: Slight hemolysis detected by analyzer. Result may be falsely elevated.        Alkaline Phosphatase 84 U/L      Total Bilirubin 0.7 mg/dL      Globulin 3.2 gm/dL      A/G Ratio 1.3 g/dL      BUN/Creatinine Ratio 25.8     Anion Gap 10.1 mmol/L      eGFR 64.4 mL/min/1.73     Narrative:      GFR Normal >60  Chronic Kidney Disease <60  Kidney Failure <15    The  GFR formula is only valid for adults with stable renal function between ages 18 and 70.    Single High Sensitivity Troponin T [[id number]]  (Normal) Collected: 07/24/24 1614    Specimen: Blood Updated: 07/24/24 1646     HS Troponin T 11 ng/L     Narrative:      High Sensitive Troponin T Reference Range:  <14.0 ng/L- Negative Female for AMI  <22.0 ng/L- Negative Male for AMI  >=14 - Abnormal Female indicating possible myocardial injury.  >=22 - Abnormal Male indicating possible myocardial injury.   Clinicians would have to utilize clinical acumen, EKG, Troponin, and serial changes to determine if it is an Acute Myocardial Infarction or myocardial injury due to an underlying chronic condition.         CK [[id number]]  (Normal) Collected: 07/24/24 1614    Specimen: Blood Updated: 07/24/24 1646     Creatine Kinase 33 U/L     BNP [[id number]]  (Normal) Collected: 07/24/24 1445    Specimen: Blood Updated: 07/24/24 1608     proBNP 1,511.0 pg/mL     Narrative:      This assay is used as an aid in the diagnosis of individuals suspected of having heart failure. It can be used as an aid in the diagnosis of acute decompensated heart failure (ADHF) in patients presenting with signs and symptoms of ADHF to the emergency department (ED). In addition, NT-proBNP of <300 pg/mL indicates ADHF is not likely.    Age Range Result Interpretation  NT-proBNP Concentration (pg/mL:      <50             Positive            >450                   Gray                 300-450                    Negative             <300    50-75           Positive            >900                  Gray                300-900                  Negative            <300      >75             Positive            >1800                  Gray                300-1800                  Negative            <300    Urinalysis, Microscopic Only - Straight Cath [[id number]] Collected: 07/24/24 1504    Specimen: Urine from Straight Cath Updated: 07/24/24 1539     RBC, UA None Seen  /HPF      WBC, UA 0-2 /HPF      Comment: Urine culture not indicated.        Bacteria, UA None Seen /HPF      Squamous Epithelial Cells, UA None Seen /HPF      Hyaline Casts, UA None Seen /LPF      Mucus, UA Trace /HPF      Methodology Manual Light Microscopy    Protime-INR [[id number]]  (Normal) Collected: 07/24/24 1454    Specimen: Blood Updated: 07/24/24 1528     Protime 10.7 Seconds      INR 0.98    aPTT [[id number]]  (Abnormal) Collected: 07/24/24 1454    Specimen: Blood Updated: 07/24/24 1528     PTT 24.2 seconds     Urinalysis With Culture If Indicated - Straight Cath [[id number]]  (Abnormal) Collected: 07/24/24 1504    Specimen: Urine from Straight Cath Updated: 07/24/24 1525     Color, UA Yellow     Appearance, UA Clear     pH, UA 8.0     Specific Gravity, UA 1.012     Glucose, UA Negative     Ketones, UA Negative     Bilirubin, UA Negative     Blood, UA Negative     Protein, UA Negative     Leuk Esterase, UA Trace     Nitrite, UA Negative     Urobilinogen, UA 0.2 E.U./dL    Narrative:      In absence of clinical symptoms, the presence of pyuria, bacteria, and/or nitrites on the urinalysis result does not correlate with infection.    Extra Tubes [[id number]] Collected: 07/24/24 1454    Specimen: Blood, Venous Line Updated: 07/24/24 1500    Narrative:      The following orders were created for panel order Extra Tubes.  Procedure                               Abnormality         Status                     ---------                               -----------         ------                     Lavender Top[[id number]]                                     Final result               Green Top (Gel)[[id number]]                                  Final result                 Please view results for these tests on the individual orders.    Lavender Top [[id number]] Collected: 07/24/24 1454    Specimen: Blood Updated: 07/24/24 1500     Extra Tube hold for add-on     Comment: Auto resulted       Green Top (Gel) [[id number]]  Collected: 07/24/24 1454    Specimen: Blood Updated: 07/24/24 1500     Extra Tube Hold for add-ons.     Comment: Auto resulted.       [place] Draw [[id number]] Collected: 07/24/24 1445    Specimen: Blood Updated: 07/24/24 1500    Narrative:      The following orders were created for panel order [place] Draw.  Procedure                               Abnormality         Status                     ---------                               -----------         ------                     Green Top (Gel)[[id number]]                                  Final result               Lavender Top[[id number]]                                     Final result                 Please view results for these tests on the individual orders.    Green Top (Gel) [[id number]] Collected: 07/24/24 1445    Specimen: Blood Updated: 07/24/24 1500     Extra Tube Hold for add-ons.     Comment: Auto resulted.       Lavender Top [[id number]] Collected: 07/24/24 1445    Specimen: Blood Updated: 07/24/24 1500     Extra Tube hold for add-on     Comment: Auto resulted       CBC & Differential [[id number]]  (Abnormal) Collected: 07/24/24 1445    Specimen: Blood Updated: 07/24/24 1456    Narrative:      The following orders were created for panel order CBC & Differential.  Procedure                               Abnormality         Status                     ---------                               -----------         ------                     CBC Auto Differential[[id number]]        Abnormal            Final result                 Please view results for these tests on the individual orders.    CBC Auto Differential [[id number]]  (Abnormal) Collected: 07/24/24 1445    Specimen: Blood Updated: 07/24/24 1456     WBC 7.16 10*3/mm3      RBC 4.51 10*6/mm3      Hemoglobin 12.0 g/dL      Hematocrit 38.2 %      MCV 84.7 fL      MCH 26.6 pg      MCHC 31.4 g/dL      RDW 24.9 %      RDW-SD 73.2 fl      MPV 9.9 fL      Platelets 322 10*3/mm3      Neutrophil % 66.0 %       Lymphocyte % 20.3 %      Monocyte % 9.9 %      Eosinophil % 2.5 %      Basophil % 1.0 %      Immature Grans % 0.3 %      Neutrophils, Absolute 4.73 10*3/mm3      Lymphocytes, Absolute 1.45 10*3/mm3      Monocytes, Absolute 0.71 10*3/mm3      Eosinophils, Absolute 0.18 10*3/mm3      Basophils, Absolute 0.07 10*3/mm3      Immature Grans, Absolute 0.02 10*3/mm3      nRBC 0.0 /100 WBC             Imaging Results (Last 24 Hours)       Procedure Component Value Units Date/Time    CT Head Without Contrast [[id number]] Collected: 07/24/24 1625     Updated: 07/24/24 1629    Narrative:      CT HEAD WO CONTRAST    Date of Exam: 7/24/2024 4:18 PM EDT    Indication: confusion, generalized weakness.    Comparison: 3/21/2024    Technique: Axial CT images were obtained of the head without contrast administration.  Coronal reconstructions were performed.  Automated exposure control and iterative reconstruction methods were used.      Findings:  Ventricles and CSF containing spaces are unchanged compared with the last exam. There is decreased attenuation in the periventricular white matter, unchanged. No mass lesions, mass effect, acute hemorrhage or edema. No intra or extra-axial fluid   collections are identified. The orbital structures appear normal. The visualized paranasal sinuses and mastoid air cells are clear. There are no fractures identified.      Impression:      Impression:    1. Age-appropriate atrophy with mild chronic periventricular ischemic changes.  2. No acute intracranial findings      Electronically Signed: Mark Steward MD    7/24/2024 4:27 PM EDT    Workstation ID: [id number]    XR Chest 1 View [[id number]] Collected: 07/24/24 1519     Updated: 07/24/24 1521    Narrative:      XR CHEST 1 VW    Date of Exam: 7/24/2024 3:14 PM EDT    Indication: cp, weakness    Comparison: 7/5/2024    Findings:  Heart size is within normal limits for the projection. The pulmonary vascular markings are normal and the lungs are clear.  A left-sided transvenous pacing device is in appropriate position.      Impression:      Impression:  No active disease      Electronically Signed: Mark Steward MD    7/24/2024 3:19 PM EDT    Workstation ID: [id number]            EKG      I personally viewed and interpreted the patient's EKG/Telemetry data:    ECHOCARDIOGRAM:  Results for orders placed during the hospital encounter of 07/05/24    Adult Transthoracic Echo Complete w/ Color, Spectral and Contrast if necessary per protocol    Interpretation Summary    Left ventricular ejection fraction appears to be 46 - 50%.    Left ventricular wall thickness is consistent with moderate concentric hypertrophy.    The left atrial cavity is severely dilated.    The right atrial cavity is moderate to severely  dilated.    Abnormal mitral valve structure consistent with dilated annulus.    Moderate to severe mitral valve regurgitation is present with a centrally-directed jet noted.    Moderate to severe tricuspid valve regurgitation is present.    Estimated right ventricular systolic pressure from tricuspid regurgitation is moderately elevated (45-55 mmHg).    Transthoracic echocardiography reveals moderate LVH with overall EF between 45 to 50%  Moderate to severe central mitral regurgitation and moderate to severe TR with moderate pulm hypertension with mild to moderate AI.  Severe left atrial enlargement and moderate right atrial enlargement and no effusion.    Electronically signed by Baljeet Valero MD, 07/06/24, 3:26 PM EDT.         STRESS MYOVIEW:  Results for orders placed during the hospital encounter of 03/31/21    Stress Test With Myocardial Perfusion One Day    Interpretation Summary  · Findings consistent with a normal ECG stress test.  · Left ventricular ejection fraction is hyperdynamic (Calculated EF > 70%). .  · Impressions are consistent with a study indicating uncertain risk.  · Large apical defect with some reperfusion during the rest images cannot  rule out ischemia EF 76% Clinical correlation is recommended.    Electronically signed by KALYANI Tay, 04/01/21, 11:25 AM EDT.       CARDIAC CATHETERIZATION:    Results for orders placed during the hospital encounter of 03/31/21    Cardiac Catheterization/Vascular Study    Narrative  CARDIAC CATHETERIZATION REPORT    DATE OF PROCEDURE: 4/2/2021      INDICATION FOR PROCEDURE: Abnormal stress test    PROCEDURE PERFORMED: Left heart catheterization, coronary angiography,    PROCEDURE COMMENTS:    All the risks and benefits explained with the patient informed consent was obtained from the patient.  Patient was brought to the cardiac catheterization laboratory placed on the table draped and prepped in the usual sterile fashion.  2% lidocaine was used to anesthetize the right groin area.  Using the modified Seldinger technique 5 Guyanese sheath was inserted into the right femoral artery.    5 Guyanese JL4 catheter was used to perform the selective left coronary angiography    5 Guyanese JR4 catheter was used to perform the selective right coronary angiography    Angio-Seal was placed after exchanging five Guyanese sheath with six Guyanese sheath    Patient tolerated procedure well without any immediate complications      FINDINGS:    1. HEMODYNAMICS:  LV end-diastolic pressure 7 mmHg, /80 mmHg.    2. LEFT VENTRICULOGRAPHY: Not done patient had echocardiogram    3. CORONARY ANGIOGRAPHY:  Dominance right  Left Main: Large-caliber vessel bifurcates into LAD circumflex artery 0% stenosis  LAD: Large-caliber vessel gives rise to couple of medium caliber diagonal arteries multiple septal branches reaches the apex mild luminal irregularities noted less than 10% stenosis  CIRC: Large-caliber vessel gives rise to marginal lateral branches less than 10% stenosis  RCA: Large-caliber dominant artery gives rise to PDA and PL branches less than 5 to 10% stenosis    SUMMARY: No obstructive coronary artery  disease    RECOMMENDATIONS: Evaluate for noncardiac causes of symptoms aggressive cardiac risk factor modification       OTHER:         MDM      Atrial fibrillation  Patient presented with atrial fibrillation with rapid response and was started on Cardizem drip but she is better now with rates well-controlled and is on amiodarone and metoprolol.  Patient is also on Eliquis at home.  No further cardiac workup at this time.    Coronary disease  Patient has nonobstructive disease in the past and is currently stable on medications    Mild LV dysfunction  Patient's EF is about 45 to 50% but is currently on beta-blockers and will watch the symptoms.    Hypertension  Patient blood pressure currently stable on metoprolol    I discussed the patients findings and my recommendations with patient and nurse    Flash Gonzalez MD  07/25/24  12:22 EDT

## 2024-07-25 NOTE — THERAPY EVALUATION
Patient Name: Catalina Moreno  : 1940    MRN: [id number]                              Today's Date: 2024       Admit Date: 2024    Visit Dx:     ICD-10-CM ICD-9-CM   1. Atrial fibrillation with RVR  I48.91 427.31   2. Generalized weakness  R53.1 780.79     Patient Active Problem List   Diagnosis    Arthritis    Depressive disorder    Primary fibromyalgia syndrome    Hypothyroidism    Atrial fibrillation with RVR    Stage 3a chronic kidney disease    Chronic low back pain    Age-related cognitive decline    Generalized anxiety disorder    Polyneuropathy, unspecified    Restless legs syndrome    Paroxysmal atrial fibrillation    Essential (primary) hypertension    Gastro-esophageal reflux disease without esophagitis    Pulmonary hypertension, unspecified    Coronary artery disease involving native coronary artery of native heart without angina pectoris    Presence of cardiac pacemaker    Chronic HFrEF (heart failure with reduced ejection fraction)    Atrial flutter with rapid ventricular response    Weakness    AF (atrial fibrillation)    Chronic heart failure with preserved ejection fraction (HFpEF)    Fall    Nasal bones, closed fracture    Iron deficiency anemia    Atrial fibrillation     Past Medical History:   Diagnosis Date    Acquired spondylolisthesis of lumbosacral region     Acute kidney injury 2022    Anxiety     Arthritis     Atrial fibrillation     paroxysmal    Broken shoulder     left-- due to fall 20 was at [facility]    Chronic pain disorder     Chronic systolic CHF (congestive heart failure) 2023    EF 36-40%    Coronary artery disease involving native coronary artery of native heart without angina pectoris 2021    nonobstructive    DDD (degenerative disc disease), cervical     DDD (degenerative disc disease), lumbar     Depression     Edema     2020 foot    GERD (gastroesophageal reflux disease)     H/O fall     Heart failure with mid-range ejection fraction  03/05/2022    EF 45% per 2D echo    Hypertension     Hypothyroidism     Insomnia     Low back pain     Moderate mitral regurgitation 01/05/2023    Neuropathy     Nonrheumatic tricuspid valve regurgitation     Pain in both feet     Polypharmacy     PONV (postoperative nausea and vomiting)     Pulmonary hypertension     Radiculopathy     Restless legs     Sacroiliitis     Sick sinus syndrome     Added automatically from request for surgery [id number]    Spondylolisthesis     Stage 3a chronic kidney disease     Urinary incontinence     Vitamin D deficiency      Past Surgical History:   Procedure Laterality Date    BACK SURGERY  07/19/2018    PDC &  PSF L3-L5 insitu    CARDIAC CATHETERIZATION N/A 4/2/2021    Procedure: Cardiac Catheterization/Vascular Study;  Surgeon: Barrett Evans MD;  Location: [facility] CATH INVASIVE LOCATION;  Service: Cardiovascular;  Laterality: N/A;    CARDIAC ELECTROPHYSIOLOGY PROCEDURE N/A 1/17/2023    Procedure: Pacemaker DC new;  Surgeon: Baljeet Valero MD;  Location: [facility] CATH INVASIVE LOCATION;  Service: Cardiovascular;  Laterality: N/A;    CHOLECYSTECTOMY      COLONOSCOPY      COLONOSCOPY N/A 7/10/2024    Procedure: COLONOSCOPY with cold snare polypectomy x1 and endoscopic clipping x2;  Surgeon: MARLIN Vanegas MD;  Location: [facility] ENDOSCOPY;  Service: Gastroenterology;  Laterality: N/A;  Post- colon polyps, diverticulosis    ENDOSCOPY N/A 9/14/2023    Procedure: ESOPHAGOGASTRODUODENOSCOPY;  Surgeon: Ulysses Lamas MD;  Location: [facility] ENDOSCOPY;  Service: Gastroenterology;  Laterality: N/A;  gastritis, inflammatory gastric polyp    ENDOSCOPY N/A 7/10/2024    Procedure: ESOPHAGOGASTRODUODENOSCOPY;  Surgeon: MARLIN Vanegas MD;  Location: [facility] ENDOSCOPY;  Service: Gastroenterology;  Laterality: N/A;  Post- hiatal hernia, gastric polyps, gastritis    HYSTERECTOMY      ROTATOR CUFF REPAIR Left       General Information       Row Name 07/25/24 1401          OT  Time and Intention    Document Type evaluation  -LS     Mode of Treatment occupational therapy  -       Row Name 07/25/24 1401          General Information    Patient Profile Reviewed yes  -LS     Prior Level of Function independent:;ADL's;community mobility;driving  Rolling walker  -LS     Existing Precautions/Restrictions fall;oxygen therapy device and L/min;orthostatic hypotension  -LS     Barriers to Rehab medically complex;previous functional deficit  -       Row Name 07/25/24 1401          Living Environment    People in Home alone  -       Row Name 07/25/24 1401          Home Main Entrance    Number of Stairs, Main Entrance one  -LS       Row Name 07/25/24 1401          Stairs Within Home, Primary    Number of Stairs, Within Home, Primary none  -LS       Row Name 07/25/24 1401          Cognition    Orientation Status (Cognition) oriented x 4  -LS       Row Name 07/25/24 1401          Safety Issues, Functional Mobility    Impairments Affecting Function (Mobility) balance;endurance/activity tolerance;pain  -LS               User Key  (r) = Recorded By, (t) = Taken By, (c) = Cosigned By      Initials Name Provider Type     Kamaljit Dumont OT Occupational Therapist                     Mobility/ADL's       Row Name 07/25/24 1401          Bed Mobility    Bed Mobility supine-sit  -     Supine-Sit [place] (Bed Mobility) contact guard;verbal cues  -     Assistive Device (Bed Mobility) bed rails;head of bed elevated  -       Row Name 07/25/24 1401          Transfers    Transfers sit-stand transfer;toilet transfer  -       Row Name 07/25/24 1401          Bed-Chair Transfer    Bed-Chair [place] (Transfers) minimum assist (75% patient effort);verbal cues  -     Assistive Device (Bed-Chair Transfers) walker, front-wheeled  -       Row Name 07/25/24 1401          Sit-Stand Transfer    Sit-Stand [place] (Transfers) verbal cues;minimum assist (75% patient effort)  -     Assistive Device  (Sit-Stand Transfers) walker, front-wheeled  -LS       Row Name 07/25/24 1401          Toilet Transfer    Type (Toilet Transfer) sit-stand;stand-sit  -     [place] Level (Toilet Transfer) minimum assist (75% patient effort);verbal cues  -LS     Assistive Device (Toilet Transfer) walker, 4-wheeled  -LS       Row Name 07/25/24 1401          Functional Mobility    Functional Mobility- Ind. Level contact guard assist  -LS     Functional Mobility- Device walker, front-wheeled  -LS     Patient was able to Ambulate yes  -LS       Row Name 07/25/24 1401          Activities of Daily Living    BADL Assessment/Intervention toileting;lower body dressing  -       Row Name 07/25/24 1401          Toileting Assessment/Training    [place] Level (Toileting) toileting skills;contact guard assist  -LS     Position (Toileting) supported sitting  -       Row Name 07/25/24 1401          Lower Body Dressing Assessment/Training    [place] Level (Lower Body Dressing) set up;lower body dressing skills  -     Position (Lower Body Dressing) edge of bed sitting  -               User Key  (r) = Recorded By, (t) = Taken By, (c) = Cosigned By      Initials Name Provider Type    Kamaljit Kat OT Occupational Therapist                   Obj/Interventions       Row Name 07/25/24 1404          Sensory Assessment (Somatosensory)    Sensory Assessment (Somatosensory) sensation intact  -       Row Name 07/25/24 1404          Vision Assessment/Intervention    Visual Impairment/Limitations WFL  -LS       Row Name 07/25/24 1404          Range of Motion Comprehensive    General Range of Motion bilateral upper extremity ROM WFL  -       Row Name 07/25/24 1404          Strength Comprehensive (MMT)    Comment, General Manual Muscle Testing (MMT) Assessment BUE grossly 4/5  -LS       Row Name 07/25/24 1404          Balance    Balance Assessment sitting static balance;sitting dynamic balance;standing static balance;standing dynamic  balance  -LS     Static Sitting Balance independent  -LS     Dynamic Sitting Balance independent  -LS     Position, Sitting Balance sitting edge of bed  -LS     Static Standing Balance contact guard  -LS     Dynamic Standing Balance minimal assist  -LS     Position/Device Used, Standing Balance supported;walker, front-wheeled  -LS               User Key  (r) = Recorded By, (t) = Taken By, (c) = Cosigned By      Initials Name Provider Type    LS Kamaljit Dumont, OT Occupational Therapist                   Goals/Plan       Row Name 07/25/24 1625          Bed Mobility Goal 1 (OT)    Activity/Assistive Device (Bed Mobility Goal 1, OT) bed mobility activities, all  -LS     [place] Level/Cues Needed (Bed Mobility Goal 1, OT) modified independence  -LS     Time Frame (Bed Mobility Goal 1, OT) long term goal (LTG);2 weeks  -       Row Name 07/25/24 1625          Transfer Goal 1 (OT)    Activity/Assistive Device (Transfer Goal 1, OT) transfers, all  -LS     [place] Level/Cues Needed (Transfer Goal 1, OT) modified independence  -LS     Time Frame (Transfer Goal 1, OT) long term goal (LTG);2 weeks  -       Row Name 07/25/24 1625          Dressing Goal 1 (OT)    Activity/Device (Dressing Goal 1, OT) dressing skills, all  -LS     [place]/Cues Needed (Dressing Goal 1, OT) modified independence  -LS     Time Frame (Dressing Goal 1, OT) long term goal (LTG);2 weeks  -       Row Name 07/25/24 1625          Toileting Goal 1 (OT)    Activity/Device (Toileting Goal 1, OT) toileting skills, all  -LS     [place] Level/Cues Needed (Toileting Goal 1, OT) modified independence  -LS     Time Frame (Toileting Goal 1, OT) long term goal (LTG);2 weeks  -       Row Name 07/25/24 1625          Therapy Assessment/Plan (OT)    Planned Therapy Interventions (OT) activity tolerance training;BADL retraining;functional balance retraining;IADL retraining;occupation/activity based interventions;ROM/therapeutic  exercise;strengthening exercise;transfer/mobility retraining;patient/caregiver education/training  -               User Key  (r) = Recorded By, (t) = Taken By, (c) = Cosigned By      Initials Name Provider Type    LS Kamaljit Dumont OT Occupational Therapist                   Clinical Impression       Row Name 07/25/24 1603          Pain Assessment    Pretreatment Pain Rating 8/10  -LS     Posttreatment Pain Rating 8/10  -LS     Pain Location - Side/Orientation Bilateral  -LS     Pain Location lower  -LS     Pain Location - extremity  -LS     Pain Intervention(s) Repositioned;Ambulation/increased activity  -LS       Row Name 07/25/24 1606          Plan of Care Review    Plan of Care Reviewed With patient  -LS     Outcome Evaluation Pt is an 85 y/o F admitted to [facility]24[facility] with c/o general weakness and palpitations. Hospital dx A.fib with RVR.  PMHx significant for CKDIII, HTN, pacemaker in place, depressive disorder, fibromyalgia, CHF, hx falls, DDD, and polyneuropathy. At baseline pt resides alone in [facility] with 0 VLADIMIR. Pt typically (I) with ADLs and uses a quad cane or RW for mobility. Pt uses shower chair. She reports she is still driving and able to walk within grocery store. Pt reports fall and hitting her head within last two months. Pt with limited social support; her neighbor reports concern that pt with increased confusion including walking in street and needing increased support. This date, pt completes bed mobility with CGA, requires MIN A with sit to stand from bed and commode. Setup provided for lower body dressing and supervision for toileting. Of note, pt did describe dizziness after ambulation and ADL performance. Supine /67 mmHg. Sitting /63 mmHg. Pt with s/s hypotension post gait -- standing BP post gait 97/55 mmHg. Ot feels pt unsafe for dc home alone at this time, as she is weak and below baseline. Recommending SNF when medically stable.  -       Row Name 07/25/24 1552           Therapy Assessment/Plan (OT)    Rehab Potential (OT) good, to achieve stated therapy goals  -LS     Criteria for Skilled Therapeutic Interventions Met (OT) yes;skilled treatment is necessary  -LS     Therapy Frequency (OT) 3 times/wk  -LS     Predicted Duration of Therapy Intervention (OT) until dc  -LS       Row Name 07/25/24 1604          Vital Signs    Pre Systolic BP Rehab 123  -LS     Pre Treatment Diastolic BP 67  -LS     Intra Systolic BP Rehab 118  -LS     Intra Treatment Diastolic BP 63  -LS     Post Systolic BP Rehab 97  -LS     Post Treatment Diastolic BP 55  -LS     Pre SpO2 (%) 94  2L  -LS     O2 Delivery Pre Treatment nasal cannula  -LS     Intra SpO2 (%) 95  -LS     O2 Delivery Intra Treatment room air  -LS     Post SpO2 (%) 98  -LS     O2 Delivery Post Treatment room air  -LS     Pre Patient Position Supine  -LS     Intra Patient Position Sitting  -LS     Post Patient Position Standing  -LS       Row Name 07/25/24 1604          Positioning and Restraints    Pre-Treatment Position in bed  -LS     Post Treatment Position chair  -LS     In Chair notified nsg;reclined;call light within reach;encouraged to call for assist;exit alarm on  -LS               User Key  (r) = Recorded By, (t) = Taken By, (c) = Cosigned By      Initials Name Provider Type    LS Kamaljit Dumont, MOHAN Occupational Therapist                   Outcome Measures       Row Name 07/25/24 1625          How much help from another is currently needed...    Putting on and taking off regular lower body clothing? 3  -LS     Bathing (including washing, rinsing, and drying) 3  -LS     Toileting (which includes using toilet bed pan or urinal) 3  -LS     Putting on and taking off regular upper body clothing 3  -LS     Taking care of personal grooming (such as brushing teeth) 4  -LS     Eating meals 4  -LS     AM-PAC 6 Clicks Score (OT) 20  -LS       Row Name 07/25/24 0800          How much help from another person do you currently need...    Turning  from your back to your side while in flat bed without using bedrails? 4  -CL     Moving from lying on back to sitting on the side of a flat bed without bedrails? 4  -CL     Moving to and from a bed to a chair (including a wheelchair)? 3  -CL     Standing up from a chair using your arms (e.g., wheelchair, bedside chair)? 3  -CL     Climbing 3-5 steps with a railing? 3  -CL     To walk in hospital room? 3  -CL     AM-PAC 6 Clicks Score (PT) 20  -CL     Highest Level of Mobility Goal 6 --> Walk 10 steps or more  -CL       Row Name 07/25/24 1625          Functional Assessment    Outcome Measure Options AM-PAC 6 Clicks Daily Activity (OT)  -               User Key  (r) = Recorded By, (t) = Taken By, (c) = Cosigned By      Initials Name Provider Type    CL Tierra Mello, RN Registered Nurse    Kamaljit Kat OT Occupational Therapist                    Occupational Therapy Education       Title: PT OT SLP Therapies (Done)       Topic: Occupational Therapy (Done)       Point: ADL training (Done)       Description:   Instruct learner(s) on proper safety adaptation and remediation techniques during self care or transfers.   Instruct in proper use of assistive devices.                  Learning Progress Summary             Patient Acceptance, E,TB, VU by  at 7/25/2024 1625                                         User Key       Initials Effective Dates Name Provider Type Discipline     09/22/22 -  Kamaljit Dumont OT Occupational Therapist OT                  OT Recommendation and Plan  Planned Therapy Interventions (OT): activity tolerance training, BADL retraining, functional balance retraining, IADL retraining, occupation/activity based interventions, ROM/therapeutic exercise, strengthening exercise, transfer/mobility retraining, patient/caregiver education/training  Therapy Frequency (OT): 3 times/wk  Plan of Care Review  Plan of Care Reviewed With: patient  Outcome Evaluation: Pt is an 83 y/o F admitted to [facility]  7/24/24 with c/o general weakness and palpitations. Hospital dx A.fib with RVR.  PMHx significant for CKDIII, HTN, pacemaker in place, depressive disorder, fibromyalgia, CHF, hx falls, DDD, and polyneuropathy. At baseline pt resides alone in [facility] with 0 VLADIMIR. Pt typically (I) with ADLs and uses a quad cane or RW for mobility. Pt uses shower chair. She reports she is still driving and able to walk within grocery store. Pt reports fall and hitting her head within last two months. Pt with limited social support; her neighbor reports concern that pt with increased confusion including walking in street and needing increased support. This date, pt completes bed mobility with CGA, requires MIN A with sit to stand from bed and commode. Setup provided for lower body dressing and supervision for toileting. Of note, pt did describe dizziness after ambulation and ADL performance. Supine /67 mmHg. Sitting /63 mmHg. Pt with s/s hypotension post gait -- standing BP post gait 97/55 mmHg. Ot feels pt unsafe for dc home alone at this time, as she is weak and below baseline. Recommending SNF when medically stable.     Time Calculation:         Time Calculation- OT       Row Name 07/25/24 1626             Time Calculation- OT    OT Start Time 1031  -LS      OT Stop Time 1111  -      OT Time Calculation (min) 40 min  -LS      Total Timed Code Minutes- OT 11 minute(s)  -LS      OT Received On 07/25/24  -      OT - Next Appointment 07/26/24  -      OT Goal Re-Cert Due Date 07/26/24  -         Timed Charges    24907 - OT Self Care/Mgmt Minutes 11  -LS         Untimed Charges    OT Eval/Re-eval Minutes 29  -LS         Total Minutes    Timed Charges Total Minutes 11  -LS      Untimed Charges Total Minutes 29  -LS       Total Minutes 40  -LS                User Key  (r) = Recorded By, (t) = Taken By, (c) = Cosigned By      Initials Name Provider Type    Kamaljit Kat OT Occupational Therapist                  Therapy  Charges for Today       Code Description Service Date Service Provider Modifiers Qty    [id number]  OT SELF CARE/MGMT/TRAIN EA 15 MIN 7/25/2024 Kamaljit Dumont OT GO 1    [id number]  OT EVAL MOD COMPLEXITY 4 7/25/2024 Kamaljit Dumont OT GO 1                 Kamaljit Dumont OT  7/25/2024

## 2024-07-25 NOTE — CASE MANAGEMENT/SOCIAL WORK
LACE Assessment      Patient Name: Catalina Moreno  : 1940  MRN: [id number]  Address: [address] [place] [address] [place] [facility]    Risk for Readmission (LACE) Score: 14 (2024  6:00 AM)        Personal History     Past Medical History:   Diagnosis Date    Acquired spondylolisthesis of lumbosacral region     Acute kidney injury 2022    Anxiety     Arthritis     Atrial fibrillation     paroxysmal    Broken shoulder     left-- due to fall 19 was at [facility]    Chronic pain disorder     Chronic systolic CHF (congestive heart failure) 2023    EF 36-40%    Coronary artery disease involving native coronary artery of native heart without angina pectoris 2021    nonobstructive    DDD (degenerative disc disease), cervical     DDD (degenerative disc disease), lumbar     Depression     Edema     2020 foot    GERD (gastroesophageal reflux disease)     H/O fall     Heart failure with mid-range ejection fraction 2022    EF 45% per 2D echo    Hypertension     Hypothyroidism     Insomnia     Low back pain     Moderate mitral regurgitation 2023    Neuropathy     Nonrheumatic tricuspid valve regurgitation     Pain in both feet     Polypharmacy     PONV (postoperative nausea and vomiting)     Pulmonary hypertension     Radiculopathy     Restless legs     Sacroiliitis     Sick sinus syndrome     Added automatically from request for surgery [id number]    Spondylolisthesis     Stage 3a chronic kidney disease     Urinary incontinence     Vitamin D deficiency        Past Surgical History:   Procedure Laterality Date    BACK SURGERY  2018    PDC &  PSF L3-L5 insitu    CARDIAC CATHETERIZATION N/A 2021    Procedure: Cardiac Catheterization/Vascular Study;  Surgeon: Barrett Evans MD;  Location: [facility] INVASIVE LOCATION;  Service: Cardiovascular;  Laterality: N/A;    CARDIAC ELECTROPHYSIOLOGY PROCEDURE N/A 2023    Procedure: Pacemaker DC new;  Surgeon: Baljeet Valero MD;   Location: [facility] CATH INVASIVE LOCATION;  Service: Cardiovascular;  Laterality: N/A;    CHOLECYSTECTOMY      COLONOSCOPY      COLONOSCOPY N/A 7/10/2024    Procedure: COLONOSCOPY with cold snare polypectomy x1 and endoscopic clipping x2;  Surgeon: MARLIN Vanegas MD;  Location: [facility] ENDOSCOPY;  Service: Gastroenterology;  Laterality: N/A;  Post- colon polyps, diverticulosis    ENDOSCOPY N/A 9/14/2023    Procedure: ESOPHAGOGASTRODUODENOSCOPY;  Surgeon: Ulysses Lamas MD;  Location: [facility] ENDOSCOPY;  Service: Gastroenterology;  Laterality: N/A;  gastritis, inflammatory gastric polyp    ENDOSCOPY N/A 7/10/2024    Procedure: ESOPHAGOGASTRODUODENOSCOPY;  Surgeon: MARLIN Vanegas MD;  Location: [facility] ENDOSCOPY;  Service: Gastroenterology;  Laterality: N/A;  Post- hiatal hernia, gastric polyps, gastritis    HYSTERECTOMY      ROTATOR CUFF REPAIR Left        Family History: family history includes Cancer in her father, paternal aunt, and paternal uncle; Diabetes in her son; Heart disease in her paternal aunt. Otherwise pertinent FHx was reviewed and not pertinent to current issue.    Social History:  reports that she has never smoked. She has never been exposed to tobacco smoke. She has never used smokeless tobacco. She reports that she does not drink alcohol and does not use drugs.          Social Determinants of Health     Social Determinants of Health     Tobacco Use: Low Risk  (7/25/2024)    Patient History     Smoking Tobacco Use: Never     Smokeless Tobacco Use: Never     Passive Exposure: Never   Alcohol Use: Not At Risk (7/25/2024)    AUDIT-C     Frequency of Alcohol Consumption: Never     Average Number of Drinks: Patient does not drink     Frequency of Binge Drinking: Never   Financial Resource Strain: Low Risk  (7/25/2024)    Overall Financial Resource Strain (CARDIA)     Difficulty of Paying Living Expenses: Not very hard   Food Insecurity: No Food Insecurity (7/25/2024)    Hunger Vital Sign      Worried About Running Out of Food in the Last Year: Never true     Ran Out of Food in the Last Year: Never true   Transportation Needs: No Transportation Needs (7/25/2024)    PRAPARE - Transportation     Lack of Transportation (Medical): No     Lack of Transportation (Non-Medical): No   Physical Activity: Inactive (7/5/2024)    Exercise Vital Sign     Days of Exercise per Week: 0 days     Minutes of Exercise per Session: 0 min   Stress: No Stress Concern Present (7/25/2024)    [employer] - Occupational Stress Questionnaire     Feeling of Stress : Not at all   Social Connections: Not At Risk (7/5/2024)    Family and Community Support     Help with Day-to-Day Activities: I don't need any help     Lonely or Isolated: Never   Interpersonal Safety: Not At Risk (7/25/2024)    Abuse Screen     Unsafe at Home or Work/School: no     Feels Threatened by Someone?: no     Does Anyone Keep You from Contacting Others or Doint Things Outside the Home?: no     Physical Sign of Abuse Present: no   Depression: Not at risk (7/25/2024)    PHQ-2     PHQ-2 Score: 0   Housing Stability: Not At Risk (7/25/2024)    Housing Stability     Current Living Arrangements: home     Potentially Unsafe Housing Conditions: none   Utilities: Not At Risk (7/25/2024)    [facility] Utilities     Threatened with loss of utilities: No   Health Literacy: At Risk (7/25/2024)    Education     Help with school or training?: Yes     Preferred Language: English   Employment: Not At Risk (7/5/2024)    Employment     Do you want help finding or keeping work or a job?: I do not need or want help   Disabilities: Not At Risk (7/25/2024)    Disabilities     Concentrating, Remembering, or Making Decisions Difficulty: no     Doing Errands Independently Difficulty: no         Assessment      Psychosocial       Row Name 07/25/24 4785       Values/Beliefs    Spiritual, Cultural Beliefs, Baptism Practices, Values that Affect Care no    Values/Beliefs  Comment [place]       Behavior WDL    Behavior WDL WDL       Mental Health    Little Interest or Pleasure in Doing Things 0-->not at all    Feeling Down, Depressed or Hopeless 0-->not at all  Her dog, Marcy, is her  and helps fill the space so she isn't lonely and gives her something to focus on doing during the day like taking him outside/feeding him/etc.       Speech WDL    Speech WDL WDL       Stress    Do you feel stress - tense, restless, nervous, or anxious, or unable to sleep at night because your mind is troubled all the time - these days? Not at all       Coping/Stress    Major Change/Loss/Stressor limited support system    Patient Personal Strengths expressive of needs;tolerant;positive attitude;resilient;resourceful    Sources of Support adult child(evangelista);friend(s);Taoism/Yazdanism organization    Techniques to [place] with Loss/Stress/Change spiritual practice(s);diversional activities    Reaction to Health Status adjusting    Understanding of Condition and Treatment partial understanding of medical condition;partial understanding of treatment    Coping/Stress Comments Notified in rounds that a friend (Shwetha) called and reported concerns about patient living alone and reports she currently helping with the dog, but not sure how long she’ll be able to. Per chart review, this is typically a concern during admissions and at times has kept patient from going to SNF. Patient historic notes reference that patient’s son (Jordan) is a  and frequently on the road. Patient’s other son (Karri) and patient do not have a close relationship. Met with patient at bedside, introduced self/role. Patient denies any major changes/loss/stressors. Affirms that her friend, Shwetha, helps her out quite a bit at home and is currently watching the dog. LSW discussed support system and she reports Jordan calls regularly, he’s just on the road for work all the time. Patient states she would not like  Karri listed as an emergency contact. LSW updated this in Epic. Patient expresses desire to return home at d/c. Discussed [facility] and patient is going to think about a possible referral.       Developmental Stage (Eriksson's)    Developmental Stage Stage 8 (66 years-death/Late Adulthood) Integrity vs. Despair       C-SSRS (Recent)    Q1 Wished to be Dead (Past Month) no    Q2 Suicidal Thoughts (Past Month) no    Q6 Suicide Behavior (Lifetime) no       Violence Risk    Feels Like Hurting Others no    Previous Attempt to Harm Others no                   Abuse/Neglect       Row Name 07/25/24 1625       Personal Safety    Feels Unsafe at Home or Work/School no    Feels Threatened by Someone no    Does Anyone Try to Keep You From Having Contact with Others or Doing Things Outside Your Home? no    Physical Signs of Abuse Present no                   Legal       Row Name 07/25/24 1625       Financial Resource Strain    How hard is it for you to pay for the very basics like food, housing, medical care, and heating? Not very       Financial/Legal    Source of Income social security    Who Manages Finances if Patient Unable Son, Jordan    Finance Comments Patient reports she retired from [employer] (?) in [place], [place].       Legal    Criminal Activity/Legal Involvement none                   Substance Abuse       Row Name 07/25/24 1627       Substance Use    Substance Use Status never used       AUDIT-C (Alcohol Use Disorders ID Test)    Q1: How often do you have a drink containing alcohol? Never    Q2: How many drinks containing alcohol do you have on a typical day when you are drinking? None    Q3: How often do you have six or more drinks on one occasion? Never    Audit-C Score 0                      Impressions     SW consulted regarding LACE needs.  SW met with patient by the bedside. SW introduced self and explained role to patient. Patient said they understood.    Education: High School completion    Mental Health:  No mental health dx or history reported.     D/C Plan: Return home alone, potential need for services. Explored [facility] referral.     Substance Use: None reported.    Hardships: None identified.       Education     See education activity for details.       KATY Jensen

## 2024-07-25 NOTE — PROGRESS NOTES
[facility] MEDICINE SERVICE  DAILY PROGRESS NOTE    NAME: Catalina Moreno  : 1940  MRN: [id number]      LOS: 1 day     PROVIDER OF SERVICE: Gaston Lopez MD    Chief Complaint: Atrial fibrillation    Subjective:     Interval History:  History taken from: patient chart RN  No CP or palpations     Review of Systems:   Review of Systems  All negative except as above  Objective:     Vital Signs  Temp:  [97.6 °F (36.4 °C)-99.9 °F (37.7 °C)] 97.6 °F (36.4 °C)  Heart Rate:  [] 78  Resp:  [14-20] 16  BP: ()/(48-93) 123/67  Flow (L/min):  [2] 2   Body mass index is 28.44 kg/m².    Physical Exam  Physical Exam  AOx3 NAD  Regular rhythm rate controlled S1-S2 audible  Lungs with fair air entry  Abdomen soft nontender nondistended  Scheduled Meds   amiodarone, 200 mg, Oral, Q12H  apixaban, 5 mg, Oral, Q12H  ferrous sulfate, 324 mg, Oral, Daily With Breakfast  metoprolol succinate XL, 50 mg, Oral, Daily  sodium chloride, 10 mL, Intravenous, Q12H       PRN Meds     acetaminophen **OR** acetaminophen **OR** acetaminophen    aluminum-magnesium hydroxide-simethicone    senna-docusate sodium **AND** polyethylene glycol **AND** bisacodyl **AND** bisacodyl    Calcium Replacement - Follow Nurse / BPA Driven Protocol    cyclobenzaprine    Magnesium Cardiology Dose Replacement - Follow Nurse / BPA Driven Protocol    nitroglycerin    ondansetron ODT **OR** ondansetron    oxyCODONE    Phosphorus Replacement - Follow Nurse / BPA Driven Protocol    Potassium Replacement - Follow Nurse / BPA Driven Protocol    [COMPLETED] Insert Peripheral IV **AND** sodium chloride    sodium chloride    sodium chloride   Infusions  dilTIAZem, 5-15 mg/hr, Last Rate: 7.5 mg/hr (24 0856)          Diagnostic Data    Results from last 7 days   Lab Units 24  0336 24  1614   WBC 10*3/mm3 6.72  --    HEMOGLOBIN g/dL 11.2*  --    HEMATOCRIT % 36.5  --    PLATELETS 10*3/mm3 304  --    GLUCOSE mg/dL 124* 128*    CREATININE mg/dL 0.89 0.89   BUN mg/dL 22 23   SODIUM mmol/L 138 137   POTASSIUM mmol/L 4.3 5.0   AST (SGOT) U/L  --  27   ALT (SGPT) U/L  --  <5   ALK PHOS U/L  --  84   BILIRUBIN mg/dL  --  0.7   ANION GAP mmol/L 11.4 10.1       CT Head Without Contrast    Result Date: 7/24/2024  Impression: 1. Age-appropriate atrophy with mild chronic periventricular ischemic changes. 2. No acute intracranial findings Electronically Signed: Mark Steward MD  7/24/2024 4:27 PM EDT  Workstation ID: [id number]    XR Chest 1 View    Result Date: 7/24/2024  Impression: No active disease Electronically Signed: Mark Steward MD  7/24/2024 3:19 PM EDT  Workstation ID: [id number]       I reviewed the patient's new clinical results.  I reviewed the patient's new imaging results and agree with the interpretation.    Assessment/Plan:     Active and Resolved Problems  Active Hospital Problems    Diagnosis  POA    **Atrial fibrillation [I48.91]  Yes      Resolved Hospital Problems   No resolved problems to display.       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  She would like to hold off on gabapentin      VTE Prophylaxis:  Pharmacologic & mechanical VTE prophylaxis orders are present.         Code status is   There are no questions and answers to display.       Plan for disposition:1 day    Time: 30 minutes    Signature: Electronically signed by Gaston Lopez MD, 07/25/24, 11:47 EDT.  [facility] Hospitalist Team

## 2024-07-25 NOTE — PLAN OF CARE
Goal Outcome Evaluation:              Outcome Evaluation: Patient currently resting abed, no distress noted. Patient arrived on unit with Cardizem drip infusing, attempted to titrate the dose and patient's heart rate went from in the 80s to . Cardizem infusing at 10 mg'hr. Patient restless and voiced complaints of bilateral leg pain, spoke with MD, new order obtained. Patient continued to voice complaints of pain, MD notified, new order obtained. Patient currently on 2L via nasal canula as her O2 was going into the upper 80s. Patient is alert and able to make needs known.

## 2024-07-26 ENCOUNTER — READMISSION MANAGEMENT (OUTPATIENT)
Dept: CALL CENTER | Facility: HOSPITAL | Age: 84
End: 2024-07-26
Payer: MEDICARE

## 2024-07-26 VITALS
BODY MASS INDEX: 28.75 KG/M2 | TEMPERATURE: 97.9 F | HEART RATE: 120 BPM | WEIGHT: 162.26 LBS | SYSTOLIC BLOOD PRESSURE: 140 MMHG | RESPIRATION RATE: 15 BRPM | OXYGEN SATURATION: 99 % | DIASTOLIC BLOOD PRESSURE: 88 MMHG | HEIGHT: 63 IN

## 2024-07-26 LAB
ANION GAP SERPL CALCULATED.3IONS-SCNC: 8.4 MMOL/L (ref 5–15)
ANISOCYTOSIS BLD QL: ABNORMAL
BASOPHILS # BLD MANUAL: 0.18 10*3/MM3 (ref 0–0.2)
BASOPHILS NFR BLD MANUAL: 3 % (ref 0–1.5)
BUN SERPL-MCNC: 21 MG/DL (ref 8–23)
BUN/CREAT SERPL: 23.6 (ref 7–25)
CALCIUM SPEC-SCNC: 8.6 MG/DL (ref 8.6–10.5)
CHLORIDE SERPL-SCNC: 103 MMOL/L (ref 98–107)
CO2 SERPL-SCNC: 28.6 MMOL/L (ref 22–29)
CREAT SERPL-MCNC: 0.89 MG/DL (ref 0.57–1)
DEPRECATED RDW RBC AUTO: 76.7 FL (ref 37–54)
EGFRCR SERPLBLD CKD-EPI 2021: 64.4 ML/MIN/1.73
EOSINOPHIL # BLD MANUAL: 0.49 10*3/MM3 (ref 0–0.4)
EOSINOPHIL NFR BLD MANUAL: 8 % (ref 0.3–6.2)
ERYTHROCYTE [DISTWIDTH] IN BLOOD BY AUTOMATED COUNT: 25.2 % (ref 12.3–15.4)
GLUCOSE SERPL-MCNC: 95 MG/DL (ref 65–99)
HCT VFR BLD AUTO: 34.9 % (ref 34–46.6)
HGB BLD-MCNC: 10.6 G/DL (ref 12–15.9)
LARGE PLATELETS: ABNORMAL
LYMPHOCYTES # BLD MANUAL: 1.52 10*3/MM3 (ref 0.7–3.1)
LYMPHOCYTES NFR BLD MANUAL: 8 % (ref 5–12)
MAGNESIUM SERPL-MCNC: 2.3 MG/DL (ref 1.6–2.4)
MCH RBC QN AUTO: 26.5 PG (ref 26.6–33)
MCHC RBC AUTO-ENTMCNC: 30.4 G/DL (ref 31.5–35.7)
MCV RBC AUTO: 87.3 FL (ref 79–97)
MONOCYTES # BLD: 0.49 10*3/MM3 (ref 0.1–0.9)
NEUTROPHILS # BLD AUTO: 3.41 10*3/MM3 (ref 1.7–7)
NEUTROPHILS NFR BLD MANUAL: 56 % (ref 42.7–76)
PHOSPHATE SERPL-MCNC: 3.1 MG/DL (ref 2.5–4.5)
PLATELET # BLD AUTO: 274 10*3/MM3 (ref 140–450)
PMV BLD AUTO: 9.7 FL (ref 6–12)
POTASSIUM SERPL-SCNC: 4.3 MMOL/L (ref 3.5–5.2)
RBC # BLD AUTO: 4 10*6/MM3 (ref 3.77–5.28)
SCAN SLIDE: NORMAL
SODIUM SERPL-SCNC: 140 MMOL/L (ref 136–145)
VARIANT LYMPHS NFR BLD MANUAL: 25 % (ref 19.6–45.3)
WBC MORPH BLD: NORMAL
WBC NRBC COR # BLD AUTO: 6.09 10*3/MM3 (ref 3.4–10.8)

## 2024-07-26 PROCEDURE — 97110 THERAPEUTIC EXERCISES: CPT

## 2024-07-26 PROCEDURE — 85025 COMPLETE CBC W/AUTO DIFF WBC: CPT | Performed by: INTERNAL MEDICINE

## 2024-07-26 PROCEDURE — 97530 THERAPEUTIC ACTIVITIES: CPT

## 2024-07-26 PROCEDURE — 83735 ASSAY OF MAGNESIUM: CPT | Performed by: INTERNAL MEDICINE

## 2024-07-26 PROCEDURE — 85007 BL SMEAR W/DIFF WBC COUNT: CPT | Performed by: INTERNAL MEDICINE

## 2024-07-26 PROCEDURE — 97116 GAIT TRAINING THERAPY: CPT

## 2024-07-26 PROCEDURE — 80048 BASIC METABOLIC PNL TOTAL CA: CPT | Performed by: INTERNAL MEDICINE

## 2024-07-26 PROCEDURE — 84100 ASSAY OF PHOSPHORUS: CPT | Performed by: INTERNAL MEDICINE

## 2024-07-26 PROCEDURE — 99232 SBSQ HOSP IP/OBS MODERATE 35: CPT

## 2024-07-26 RX ORDER — AMIODARONE HYDROCHLORIDE 200 MG/1
200 TABLET ORAL EVERY 12 HOURS SCHEDULED
Start: 2024-07-26 | End: 2024-08-25

## 2024-07-26 RX ORDER — AMIODARONE HYDROCHLORIDE 200 MG/1
200 TABLET ORAL EVERY 12 HOURS SCHEDULED
Start: 2024-07-26 | End: 2024-07-26

## 2024-07-26 RX ADMIN — APIXABAN 5 MG: 5 TABLET, FILM COATED ORAL at 09:50

## 2024-07-26 RX ADMIN — AMIODARONE HYDROCHLORIDE 200 MG: 200 TABLET ORAL at 09:50

## 2024-07-26 RX ADMIN — SPIRONOLACTONE 25 MG: 25 TABLET ORAL at 09:50

## 2024-07-26 RX ADMIN — METOPROLOL SUCCINATE 50 MG: 50 TABLET, EXTENDED RELEASE ORAL at 09:50

## 2024-07-26 RX ADMIN — FERROUS SULFATE TAB EC 324 MG (65 MG FE EQUIVALENT) 324 MG: 324 (65 FE) TABLET DELAYED RESPONSE at 09:50

## 2024-07-26 RX ADMIN — Medication 10 ML: at 09:50

## 2024-07-26 NOTE — OUTREACH NOTE
Prep Survey      Flowsheet Row Responses   [facility] facility patient discharged from? [facility]   Is LACE score < 7 ? No   Eligibility [facility]   Date of Admission 07/24/24   Date of Discharge 07/26/24   Discharge diagnosis Atrial fibrillation   Does the patient have one of the following disease processes/diagnoses(primary or secondary)? Other   Does the patient have Home health ordered? Yes   What is the Home health agency?  [facility] [place]   Prep survey completed? Yes            Maia JIMENEZ - Registered Nurse

## 2024-07-26 NOTE — DISCHARGE SUMMARY
[facility] Medicine Services  Discharge Summary    Date of Service: 24  Patient Name: Catalina Moreno  : 1940  MRN: [id number]    Date of Admission: 2024  Discharge Diagnosis: #A-fib RVR  Date of Discharge:  24  Primary Care Physician: Liv Duron APRN      Presenting Problem:   Atrial fibrillation [I48.91]  Atrial fibrillation with RVR [I48.91]  Generalized weakness [R53.1]    Active and Resolved Hospital Problems:  Active Hospital Problems    Diagnosis POA    **Atrial fibrillation [I48.91] Yes      Resolved Hospital Problems   No resolved problems to display.         Hospital Course     HPI:  Admitting team HPI   A 84 y.o. old female patient with PMH of PAF CAD presents to the hospital with complaints of general weakness and palpitation.In the Ed, she is with A fib with RVR and started on cardizem drip. She is on amiodarone and eliquis at home. She is also using gabapentin for the nerve pain. CXR CT head and labs seems to be fairly stable.     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  She would like to hold off on gabapentin        DISCHARGE Follow Up Recommendations for labs and diagnostics: out patient follow up with cardiology       Reasons For Change In Medications and Indications for New Medications:      Day of Discharge     Vital Signs:  Temp:  [97.5 °F (36.4 °C)-97.8 °F (36.6 °C)] 97.5 °F (36.4 °C)  Heart Rate:  [] 106  Resp:  [14-19] 14  BP: (104-128)/(60-75) 128/60  Flow (L/min):  [2] 2    Physical Exam:  Physical Exam AOx3 NAD  Regular rhythm rate controlled S1-S2 audible  Lungs with fair air entry  Abdomen soft nontender  nondistended      Pertinent  and/or Most Recent Results     LAB RESULTS:      Lab 07/26/24  0509 07/25/24  0336 07/24/24  1454 07/24/24  1445   WBC 6.09 6.72  --  7.16   HEMOGLOBIN 10.6* 11.2*  --  12.0   HEMATOCRIT 34.9 36.5  --  38.2   PLATELETS 274 304  --  322   NEUTROS ABS 3.41 4.00  --  4.73   IMMATURE GRANS (ABS)  --  0.02  --  0.02   LYMPHS ABS  --  1.78  --  1.45   MONOS ABS  --  0.67  --  0.71   EOS ABS 0.49* 0.20  --  0.18   MCV 87.3 84.7  --  84.7   PROTIME  --   --  10.7  --    APTT  --   --  24.2*  --          Lab 07/26/24  0509 07/25/24  0336 07/24/24  1614   SODIUM 140 138 137   POTASSIUM 4.3 4.3 5.0   CHLORIDE 103 101 100   CO2 28.6 25.6 26.9   ANION GAP 8.4 11.4 10.1   BUN 21 22 23   CREATININE 0.89 0.89 0.89   EGFR 64.4 64.4 64.4   GLUCOSE 95 124* 128*   CALCIUM 8.6 9.3 9.6   MAGNESIUM 2.3 2.0 2.1   PHOSPHORUS 3.1 4.1  --          Lab 07/24/24  1614   TOTAL PROTEIN 7.5   ALBUMIN 4.3   GLOBULIN 3.2   ALT (SGPT) <5   AST (SGOT) 27   BILIRUBIN 0.7   ALK PHOS 84         Lab 07/24/24  1614 07/24/24  1454 07/24/24  1445   PROBNP  --   --  1,511.0   HSTROP T 11  --   --    PROTIME  --  10.7  --    INR  --  0.98  --                  Brief Urine Lab Results  (Last result in the past 365 days)        Color   Clarity   Blood   Leuk Est   Nitrite   Protein   CREAT   Urine HCG        07/24/24 1504 Yellow   Clear   Negative   Trace   Negative   Negative                 Microbiology Results (last 10 days)       ** No results found for the last 240 hours. **            CT Head Without Contrast    Result Date: 7/24/2024  Impression: Impression: 1. Age-appropriate atrophy with mild chronic periventricular ischemic changes. 2. No acute intracranial findings Electronically Signed: Mark Steward MD  7/24/2024 4:27 PM EDT  Workstation ID: [id number]    XR Chest 1 View    Result Date: 7/24/2024  Impression: Impression: No active disease Electronically Signed: Mark Steward MD  7/24/2024 3:19 PM EDT  Workstation ID:  [id number]    XR Sacrum & Coccyx    Result Date: 7/5/2024  Impression: Impression: 1. No evidence of acute L-spine fracture or subluxation. Stable degenerative changes in spondylolisthesis. 2. No evidence of displaced sacrococcygeal fracture. Electronically Signed: Quan Cazares MD  7/5/2024 11:54 AM EDT  Workstation ID: [id number]    XR Spine Lumbar Complete 4+VW    Result Date: 7/5/2024  Impression: Impression: 1. No evidence of acute L-spine fracture or subluxation. Stable degenerative changes in spondylolisthesis. 2. No evidence of displaced sacrococcygeal fracture. Electronically Signed: Quan Cazares MD  7/5/2024 11:54 AM EDT  Workstation ID: [id number]    XR Chest 1 View    Result Date: 7/5/2024  Impression: Impression: Cardiomegaly with pulmonary vascular congestion and mild interstitial edema. No focal consolidation or pleural disease. Electronically Signed: Lee Paris MD  7/5/2024 11:43 AM EDT  Workstation ID: [id number]    XR Shoulder 2+ View Right    Result Date: 7/5/2024  Impression: Impression: Degenerative changes with no definite acute osseous abnormality noted Electronically Signed: Cesar Arzate MD  7/5/2024 11:42 AM EDT  Workstation ID: [id number]     Results for orders placed during the hospital encounter of 03/04/22    Duplex Venous Lower Extremity - Bilateral CAR    Interpretation Summary  · Normal bilateral lower extremity venous duplex scan.  · Nonvascular cystic mass located in the left posterior mid calf.      Results for orders placed during the hospital encounter of 03/04/22    Duplex Venous Lower Extremity - Bilateral CAR    Interpretation Summary  · Normal bilateral lower extremity venous duplex scan.  · Nonvascular cystic mass located in the left posterior mid calf.      Results for orders placed during the hospital encounter of 07/05/24    Adult Transthoracic Echo Complete w/ Color, Spectral and Contrast if necessary per protocol    Interpretation Summary    Left ventricular ejection  fraction appears to be 46 - 50%.    Left ventricular wall thickness is consistent with moderate concentric hypertrophy.    The left atrial cavity is severely dilated.    The right atrial cavity is moderate to severely  dilated.    Abnormal mitral valve structure consistent with dilated annulus.    Moderate to severe mitral valve regurgitation is present with a centrally-directed jet noted.    Moderate to severe tricuspid valve regurgitation is present.    Estimated right ventricular systolic pressure from tricuspid regurgitation is moderately elevated (45-55 mmHg).    Transthoracic echocardiography reveals moderate LVH with overall EF between 45 to 50%  Moderate to severe central mitral regurgitation and moderate to severe TR with moderate pulm hypertension with mild to moderate AI.  Severe left atrial enlargement and moderate right atrial enlargement and no effusion.    Electronically signed by Baljeet Valero MD, 07/06/24, 3:26 PM EDT.      Labs Pending at Discharge:      Procedures Performed           Consults:   Consults       Date and Time Order Name Status Description    7/24/2024  5:42 PM Inpatient Cardiology Consult      7/9/2024  8:30 AM Inpatient Gastroenterology Consult Completed               Discharge Details        Discharge Medications        Changes to Medications        Instructions Start Date   amiodarone 200 MG tablet  Commonly known as: PACERONE  What changed: See the new instructions.   200 mg, Oral, Every 12 Hours Scheduled             Continue These Medications        Instructions Start Date   apixaban 5 MG tablet tablet  Commonly known as: ELIQUIS   5 mg, Oral, Every 12 Hours Scheduled      cyclobenzaprine 5 MG tablet  Commonly known as: FLEXERIL   5 mg, Oral, 2 Times Daily PRN      ferrous gluconate 324 MG tablet  Commonly known as: FERGON   324 mg, Oral, Every Other Day      melatonin 3 MG tablet   3 mg, Oral, Nightly      metoprolol succinate XL 50 MG 24 hr tablet  Commonly known as:  TOPROL-XL   50 mg, Oral, Daily, For Afib/Hypertension      spironolactone 25 MG tablet  Commonly known as: Aldactone   25 mg, Oral, Daily, For swelling/CHF             Stop These Medications      gabapentin 100 MG capsule  Commonly known as: NEURONTIN              Allergies   Allergen Reactions    Baclofen Rash    Codeine Nausea Only    Ibuprofen Unknown (See Comments)     Patient doesn't know----Motin    Methocarbamol Unknown (See Comments)     Patient doesn't know    Naproxen Unknown (See Comments)     Pt. Doesn't know     Tizanidine Hcl Other (See Comments)     Syncope     Tolmetin Dizziness     Pt. Doesn't know-- same as Tolectin         Discharge Disposition:   Home with services. Refused rehab placement      Diet:  Hospital:  Diet Order   Procedures    Diet: Regular/House; Fluid Consistency: Thin (IDDSI 0)         Discharge Activity:         CODE STATUS:  There are no questions and answers to display.         Future Appointments   Date Time Provider Department Center   8/1/2024 11:00 AM Liv Duron APRN [facility]   8/13/2024 10:00 AM [facility] [place] DEVICE [facility]   8/13/2024 10:30 AM Flash Gonzalez MD [facility]           Time spent on Discharge including face to face service:  >30 minutes    Signature: Electronically signed by Gaston Lopez MD, 07/26/24, 11:04 EDT.  [facility] Hospitalist Team

## 2024-07-26 NOTE — PROGRESS NOTES
[facility] MEDICINE SERVICE  DAILY PROGRESS NOTE    NAME: Catalina Moreno  : 1940  MRN: [id number]      LOS: 2 days     PROVIDER OF SERVICE: Gaston Lopez MD    Chief Complaint: Atrial fibrillation    Subjective:     Interval History:  History taken from: patient chart RN  No CP or SOB    Review of Systems:   Review of Systems  All negative except as above  Objective:     Vital Signs  Temp:  [97.5 °F (36.4 °C)-97.8 °F (36.6 °C)] 97.5 °F (36.4 °C)  Heart Rate:  [] 106  Resp:  [14-19] 14  BP: (104-131)/(57-75) 128/60  Flow (L/min):  [2] 2   Body mass index is 28.75 kg/m².    Physical Exam  Physical Exam  AOx3 NAD  Regular rhythm rate controlled S1-S2 audible  Lungs with fair air entry  Abdomen soft nontender nondistended  Scheduled Meds   amiodarone, 200 mg, Oral, Q12H  apixaban, 5 mg, Oral, Q12H  ferrous sulfate, 324 mg, Oral, Daily With Breakfast  metoprolol succinate XL, 50 mg, Oral, Daily  sodium chloride, 10 mL, Intravenous, Q12H  spironolactone, 25 mg, Oral, Daily       PRN Meds     acetaminophen **OR** acetaminophen **OR** acetaminophen    aluminum-magnesium hydroxide-simethicone    senna-docusate sodium **AND** polyethylene glycol **AND** bisacodyl **AND** bisacodyl    Calcium Replacement - Follow Nurse / BPA Driven Protocol    cyclobenzaprine    Magnesium Cardiology Dose Replacement - Follow Nurse / BPA Driven Protocol    nitroglycerin    ondansetron ODT **OR** ondansetron    oxyCODONE    Phosphorus Replacement - Follow Nurse / BPA Driven Protocol    Potassium Replacement - Follow Nurse / BPA Driven Protocol    [COMPLETED] Insert Peripheral IV **AND** sodium chloride    sodium chloride    sodium chloride   Infusions  dilTIAZem, 5-15 mg/hr, Last Rate: Stopped (24 1205)          Diagnostic Data    Results from last 7 days   Lab Units 24  0509 24  0336 24  1614   WBC 10*3/mm3 6.09   < >  --    HEMOGLOBIN g/dL 10.6*   < >  --    HEMATOCRIT % 34.9   < >  --     PLATELETS 10*3/mm3 274   < >  --    GLUCOSE mg/dL 95   < > 128*   CREATININE mg/dL 0.89   < > 0.89   BUN mg/dL 21   < > 23   SODIUM mmol/L 140   < > 137   POTASSIUM mmol/L 4.3   < > 5.0   AST (SGOT) U/L  --   --  27   ALT (SGPT) U/L  --   --  <5   ALK PHOS U/L  --   --  84   BILIRUBIN mg/dL  --   --  0.7   ANION GAP mmol/L 8.4   < > 10.1    < > = values in this interval not displayed.       CT Head Without Contrast    Result Date: 7/24/2024  Impression: 1. Age-appropriate atrophy with mild chronic periventricular ischemic changes. 2. No acute intracranial findings Electronically Signed: Mark Steward MD  7/24/2024 4:27 PM EDT  Workstation ID: [id number]    XR Chest 1 View    Result Date: 7/24/2024  Impression: No active disease Electronically Signed: Mark Steward MD  7/24/2024 3:19 PM EDT  Workstation ID: [id number]       I reviewed the patient's new clinical results.  I reviewed the patient's new imaging results and agree with the interpretation.    Assessment/Plan:     Active and Resolved Problems  Active Hospital Problems    Diagnosis  POA    **Atrial fibrillation [I48.91]  Yes      Resolved Hospital Problems   No resolved problems to display.       #A-fib RVR  Cardiology consulted  Started on Cardizem drip which has been weaned off  Continue Eliquis  Continue amiodarone  Continue Toprol 50 daily  Telemonitoring  Defer EP consult to cardiology      #HFmrEF-not in exacerbation  #HTN  Continue Aldactone  Continue toprol         #Neuropathy  Patient was started on gabapentin by PCP patient reports more weakness and confusion since then.  She would like to hold off on gabapentin    VTE Prophylaxis:  Pharmacologic & mechanical VTE prophylaxis orders are present.         Code status is   There are no questions and answers to display.       Plan for disposition: Pending placement    Time: 30 minutes    Signature: Electronically signed by Gaston Lopez MD, 07/26/24, 10:53 EDT.  [facility] Hospitalist Team

## 2024-07-26 NOTE — DISCHARGE PLACEMENT REQUEST
"Catalina Carrera (84 y.o. Female)       Date of Birth   1940    Social Security Number       Address   [address] [place] [address] [place] [facility]    Home Phone   [phone]    MRN   [id number]       Lutheran   None    Marital Status                               Admission Date   7/24/24    Admission Type   Emergency    Admitting Provider   Monalisa Lopes MD    Attending Provider   Gaston Lopez MD    Department, Room/Bed   [facility], 2107/1       Discharge Date       Discharge Disposition       Discharge Destination                                 Attending Provider: Gaston Lopez MD    Allergies: Baclofen, Codeine, Ibuprofen, Methocarbamol, Naproxen, Tizanidine Hcl, Tolmetin    Isolation: None   Infection: None   Code Status: Prior    Ht: 160 cm (62.99\")   Wt: 73.6 kg (162 lb 4.1 oz)    Admission Cmt: None   Principal Problem: Atrial fibrillation [I48.91]                   Active Insurance as of 7/24/2024       Primary Coverage       Payor Plan Insurance Group Employer/Plan Group    MEDICARE MEDICARE A & B        Payor Plan Address Payor Plan Phone Number Payor Plan Fax Number Effective Dates    [address] [phone]  4/1/2006 - None Entered    [facility] [id number]         Subscriber Name Subscriber Birth Date Member ID       CATALIAN CARRERA 1940 [id number]               Secondary Coverage       Payor Plan Insurance Group Employer/Plan Group    ANTHEM BLUE CROSS ANTHEM BLUE CROSS BLUE SHIELD PPO [id number]       Payor Plan Address Payor Plan Phone Number Payor Plan Fax Number Effective Dates    [address] [phone]  2/2/2022 - None Entered    [facility] [id number]         Subscriber Name Subscriber Birth Date Member ID       CATALINA CARRERA 1940 [id number]               Tertiary Coverage       Payor Plan Insurance Group Employer/Plan Group    [place]        Payor Plan Address Payor Plan Phone Number Payor Plan " Fax Number Effective Dates    [address]   2022 - None Entered    [place] [address]         Subscriber Name Subscriber Birth Date Member ID       CATALINA CARRERA 1940 [id number]                     Emergency Contacts        (Rel.) Home Phone Work Phone Mobile Phone    MADHAV DEAN (Son) [phone] -- [phone]    aramis dean (Son) -- -- [phone]    Shwetha Eldridge (Neighbor) [phone] -- --                 History & Physical        Monalisa Lopes MD at 24 2201            History and Physical   Catalina Carrera : 1940 MRN:[id number] LOS:0     Reason for admission: Atrial fibrillation     Assessment / Plan     #Generalized weakness  #A fib with RVR  -presented with generalizied weakness   -RVP pending   -CXR and UA fine   -echo in  with EF of 46-50%   -A fib with RVR in the ED and now on cardizem drip  -eliquis resumed   -cardio consult   -tele to continue   -PT OT to see     Neuropathic pain/RLS  -on gabapentin         Nutrition: Diet: Regular/House; Fluid Consistency: Thin (IDDSI 0)     DVT Prophylaxis: [unfilled]     History of Present illness     A 84 y.o. old female patient with PMH of PAF CAD presents to the hospital with complaints of general weakness and palpitation.In the Ed, she is with A fib with RVR and started on cardizem drip. She is on amiodarone and eliquis at home. She is also using gabapentin for the nerve pain. CXR CT head and labs seems to be fairly stable.     Admitted for a fib with RVR.       Subjective / Review of systems     Review of Systems   No cp     Past Medical/Surgical/Social/Family History & Allergies     Past Medical History:   Diagnosis Date    Acquired spondylolisthesis of lumbosacral region     Acute kidney injury 2022    Anxiety     Arthritis     Atrial fibrillation     paroxysmal    Broken shoulder     left-- due to fall 19 was at [facility]    Chronic pain disorder     Chronic systolic CHF (congestive heart  failure) 01/05/2023    EF 36-40%    Coronary artery disease involving native coronary artery of native heart without angina pectoris 04/02/2021    nonobstructive    DDD (degenerative disc disease), cervical     DDD (degenerative disc disease), lumbar     Depression     Edema     9/2020 foot    GERD (gastroesophageal reflux disease)     H/O fall     Heart failure with mid-range ejection fraction 03/05/2022    EF 45% per 2D echo    Hypertension     Hypothyroidism     Insomnia     Low back pain     Moderate mitral regurgitation 01/05/2023    Neuropathy     Nonrheumatic tricuspid valve regurgitation     Pain in both feet     Polypharmacy     PONV (postoperative nausea and vomiting)     Pulmonary hypertension     Radiculopathy     Restless legs     Sacroiliitis     Sick sinus syndrome     Added automatically from request for surgery [id number]    Spondylolisthesis     Stage 3a chronic kidney disease     Urinary incontinence     Vitamin D deficiency       Past Surgical History:   Procedure Laterality Date    BACK SURGERY  07/19/2018    PDC &  PSF L3-L5 insitu    CARDIAC CATHETERIZATION N/A 4/2/2021    Procedure: Cardiac Catheterization/Vascular Study;  Surgeon: Barrett Evans MD;  Location: [facility] CATH INVASIVE LOCATION;  Service: Cardiovascular;  Laterality: N/A;    CARDIAC ELECTROPHYSIOLOGY PROCEDURE N/A 1/17/2023    Procedure: Pacemaker DC new;  Surgeon: Baljeet Valero MD;  Location: [facility] CATH INVASIVE LOCATION;  Service: Cardiovascular;  Laterality: N/A;    CHOLECYSTECTOMY      COLONOSCOPY      COLONOSCOPY N/A 7/10/2024    Procedure: COLONOSCOPY with cold snare polypectomy x1 and endoscopic clipping x2;  Surgeon: MARLIN Vanegas MD;  Location: [facility] ENDOSCOPY;  Service: Gastroenterology;  Laterality: N/A;  Post- colon polyps, diverticulosis    ENDOSCOPY N/A 9/14/2023    Procedure: ESOPHAGOGASTRODUODENOSCOPY;  Surgeon: Ulysses Lamas MD;  Location: [facility] ENDOSCOPY;  Service:  Gastroenterology;  Laterality: N/A;  gastritis, inflammatory gastric polyp    ENDOSCOPY N/A 7/10/2024    Procedure: ESOPHAGOGASTRODUODENOSCOPY;  Surgeon: MARLIN Vanegas MD;  Location: [facility] ENDOSCOPY;  Service: Gastroenterology;  Laterality: N/A;  Post- hiatal hernia, gastric polyps, gastritis    HYSTERECTOMY      ROTATOR CUFF REPAIR Left       Social History     Socioeconomic History    Marital status:    Tobacco Use    Smoking status: Never     Passive exposure: Never    Smokeless tobacco: Never   Vaping Use    Vaping status: Never Used   Substance and Sexual Activity    Alcohol use: No    Drug use: Never    Sexual activity: Defer      Family History   Problem Relation Age of Onset    Cancer Father     Diabetes Son     Cancer Paternal Aunt     Heart disease Paternal Aunt     Cancer Paternal Uncle       Allergies   Allergen Reactions    Baclofen Rash    Codeine Nausea Only    Ibuprofen Unknown (See Comments)     Patient doesn't know----Motin    Methocarbamol Unknown (See Comments)     Patient doesn't know    Naproxen Unknown (See Comments)     Pt. Doesn't know     Tizanidine Hcl Other (See Comments)     Syncope     Tolmetin Dizziness     Pt. Doesn't know-- same as Tolectin        Home Medications     Prior to Admission medications    Medication Sig Start Date End Date Taking? Authorizing Provider   apixaban (ELIQUIS) 5 MG tablet tablet Take 1 tablet by mouth Every 12 (Twelve) Hours. Indications: Atrial Fibrillation 7/11/24  Yes Zuleima Canales MD   gabapentin (NEURONTIN) 100 MG capsule Take 1 capsule by mouth Every Night. For pain 7/17/24  Yes Liv Duron APRN   amiodarone (PACERONE) 200 MG tablet Take 1 tablet by mouth Every 8 (Eight) Hours for 12 days, THEN 1 tablet Every 12 (Twelve) Hours for 14 days, THEN 1 tablet Daily for 30 days. 7/11/24 9/5/24  Zuleima Canales MD   cyclobenzaprine (FLEXERIL) 5 MG tablet Take 1 tablet by mouth 2 (Two) Times a Day As Needed for Muscle Spasms.     Provider, MD Elton   ferrous gluconate (FERGON) 324 MG tablet Take 1 tablet by mouth Every Other Day for 60 days. 7/11/24 9/9/24  Zuleima Canales MD   melatonin 3 MG tablet Take 1 tablet by mouth Every Night.    Provider, MD Elton   metoprolol succinate XL (TOPROL-XL) 50 MG 24 hr tablet Take 1 tablet by mouth Daily. For Afib/Hypertension 7/17/24   Liv Duron APRN   spironolactone (Aldactone) 25 MG tablet Take 1 tablet by mouth Daily. For swelling/CHF 7/17/24   Liv Duron APRN   pantoprazole (PROTONIX) 40 MG EC tablet TAKE 1 TABLET BY MOUTH EVERY DAY 7/18/24 7/24/24  Liv Duron APRN      Objective / Physical Exam   Vital signs:  Temp: 98.7 °F (37.1 °C)  BP: 121/70  Heart Rate: 89  Resp: 15  SpO2: 93 %  Weight: 77 kg (169 lb 12.1 oz)    Admission Weight: Weight: 77 kg (169 lb 12.1 oz)    Physical Exam   Physical Exam  HENT:      Head: Normocephalic and atraumatic.      Nose: Nose normal.   Eyes:      Extraocular Movements: Extraocular movements intact.      Conjunctivae/sclera: Conjunctivae normal.      Pupils: Pupils are equal, round, and reactive to light.   Cardiovascular:   IRIR   Pulmonary:      Effort: normal      Breath sounds: normal   Abdominal:      General: Abdomen is flat. Bowel sounds are normal.      Palpations: Abdomen is soft.   Musculoskeletal:         General: Normal range of motion.      Cervical back: Normal range of motion and neck supple.   Skin:     General: Skin is dry.   Neurological:      General: No focal deficit present.      Mental Status: alert.   Psychiatric:         Mood and Affect: Mood normal.        Labs     Results from last 7 days   Lab Units 07/24/24  1445   WBC 10*3/mm3 7.16   HEMATOCRIT % 38.2   PLATELETS 10*3/mm3 322      Results from last 7 days   Lab Units 07/24/24  1614   SODIUM mmol/L 137   POTASSIUM mmol/L 5.0   CHLORIDE mmol/L 100   CO2 mmol/L 26.9   BUN mg/dL 23   CREATININE mg/dL 0.89        Current Medications   Scheduled  Meds:apixaban, 5 mg, Oral, Q12H  [START ON 2024] ferrous sulfate, 324 mg, Oral, Daily With Breakfast  gabapentin, 100 mg, Oral, Nightly  [START ON 2024] metoprolol succinate XL, 50 mg, Oral, Daily  sodium chloride, 10 mL, Intravenous, Q12H         Continuous Infusions:dilTIAZem, 5-15 mg/hr, Last Rate: 10 mg/hr (24 1930)         Monalisa Lopes MD  [facility] Medicine   24   22:01 EDT         Electronically signed by Monalisa Lopes MD at 24 2220          Physician Progress Notes (last 24 hours)        Gasotn Lopez MD at 24 1147              [facility] MEDICINE SERVICE  DAILY PROGRESS NOTE    NAME: Catalina Moreno  : 1940  MRN: [id number]      LOS: 1 day     PROVIDER OF SERVICE: Gaston Lopez MD    Chief Complaint: Atrial fibrillation    Subjective:     Interval History:  History taken from: patient chart RN  No CP or palpations     Review of Systems:   Review of Systems  All negative except as above  Objective:     Vital Signs  Temp:  [97.6 °F (36.4 °C)-99.9 °F (37.7 °C)] 97.6 °F (36.4 °C)  Heart Rate:  [] 78  Resp:  [14-20] 16  BP: ()/(48-93) 123/67  Flow (L/min):  [2] 2   Body mass index is 28.44 kg/m².    Physical Exam  Physical Exam  AOx3 NAD  Regular rhythm rate controlled S1-S2 audible  Lungs with fair air entry  Abdomen soft nontender nondistended  Scheduled Meds   amiodarone, 200 mg, Oral, Q12H  apixaban, 5 mg, Oral, Q12H  ferrous sulfate, 324 mg, Oral, Daily With Breakfast  metoprolol succinate XL, 50 mg, Oral, Daily  sodium chloride, 10 mL, Intravenous, Q12H       PRN Meds     acetaminophen **OR** acetaminophen **OR** acetaminophen    aluminum-magnesium hydroxide-simethicone    senna-docusate sodium **AND** polyethylene glycol **AND** bisacodyl **AND** bisacodyl    Calcium Replacement - Follow Nurse / BPA Driven Protocol    cyclobenzaprine    Magnesium Cardiology Dose Replacement - Follow Nurse / BPA Driven Protocol    nitroglycerin     ondansetron ODT **OR** ondansetron    oxyCODONE    Phosphorus Replacement - Follow Nurse / BPA Driven Protocol    Potassium Replacement - Follow Nurse / BPA Driven Protocol    [COMPLETED] Insert Peripheral IV **AND** sodium chloride    sodium chloride    sodium chloride   Infusions  dilTIAZem, 5-15 mg/hr, Last Rate: 7.5 mg/hr (07/25/24 0856)          Diagnostic Data    Results from last 7 days   Lab Units 07/25/24  0336 07/24/24  1614   WBC 10*3/mm3 6.72  --    HEMOGLOBIN g/dL 11.2*  --    HEMATOCRIT % 36.5  --    PLATELETS 10*3/mm3 304  --    GLUCOSE mg/dL 124* 128*   CREATININE mg/dL 0.89 0.89   BUN mg/dL 22 23   SODIUM mmol/L 138 137   POTASSIUM mmol/L 4.3 5.0   AST (SGOT) U/L  --  27   ALT (SGPT) U/L  --  <5   ALK PHOS U/L  --  84   BILIRUBIN mg/dL  --  0.7   ANION GAP mmol/L 11.4 10.1       CT Head Without Contrast    Result Date: 7/24/2024  Impression: 1. Age-appropriate atrophy with mild chronic periventricular ischemic changes. 2. No acute intracranial findings Electronically Signed: Mark Steward MD  7/24/2024 4:27 PM EDT  Workstation ID: [id number]    XR Chest 1 View    Result Date: 7/24/2024  Impression: No active disease Electronically Signed: Mark Steward MD  7/24/2024 3:19 PM EDT  Workstation ID: [id number]       I reviewed the patient's new clinical results.  I reviewed the patient's new imaging results and agree with the interpretation.    Assessment/Plan:     Active and Resolved Problems  Active Hospital Problems    Diagnosis  POA    **Atrial fibrillation [I48.91]  Yes      Resolved Hospital Problems   No resolved problems to display.       #A-fib RVR  Cardiology consulted  On Cardizem drip wean off as tolerated  Continue Eliquis  Amiodarone resumed  Continue Toprol 50 daily  Telemonitoring  Defer EP consult to cardiology     #HFmrEF-not in exacerbation  #HTN  Resume aldactone   Continue toprol       #Neuropathy  Patient was started on gabapentin by PCP patient reports more weakness and confusion since  then.  She would like to hold off on gabapentin      VTE Prophylaxis:  Pharmacologic & mechanical VTE prophylaxis orders are present.         Code status is   There are no questions and answers to display.       Plan for disposition:1 day    Time: 30 minutes    Signature: Electronically signed by Gaston Lopez MD, 07/25/24, 11:47 EDT.  [facility] Hospitalist Team      Electronically signed by Gaston Lopez MD at 07/25/24 1153       Medical Student Notes (last 24 hours)  Notes from 07/25/24 0937 through 07/26/24 0937   No notes of this type exist for this encounter.          Physical Therapy Notes (last 24 hours)        Rosio Fam at 07/25/24 1337  Version 1 of 1         Goal Outcome Evaluation:              Outcome Evaluation: Pt is an 85 y/o female presenting to [facility] on 7/24 with c/o generalized weakness and palpitations. In ED, pt with A fib with RVR and started on cardizem drip. 7/24: chest XR and CT head (-) acute changes.   PMH of PAF, CAD. At time of therapy eval, pt A&Ox4; pt reports she lives alone in [facility] with one step to enter; reports PLOF of Mod(I) with RWx for household/community mobility/ambulation. Pt uses shower chair. She reports she is still driving and able to walk within grocery store.   Pt reports fall and hitting her head within last two months. Pt with limited social support; her neighbor reports concern that pt with increased confusion including walking in street and needing increased support.   New reliance on supplemental O2(2L NC when with therapy entered room, SaO2 93%).  Supine /67 mmHg, pulse 72 bpm.   Sitting /63 mmHg,; Pt with s/s hypotension post gait -- standing BP post gait 97/55 mmHg, pulse 72-86 bpm, SaO2 94% with room air.   BLE pain 8/10, but reports it is improved since last night. At time of therapy eval, pt completes bed mobility with CGA, requires MIN A with sit to stand with Rwx from stanrdard height surfaces, and ambulates x80 ft with Rwx  "and CGA, but does report feeling weak and \"funny headed\" - orthostatic hypotension noted. Pt reuqires increased time to complete transfer to/from commode. Due to functional decline, environmental barriers, fall risk / decreased safety awareness, and limited social support, follow-up SNF recommended at time of d/c from [facility].      Anticipated Discharge Disposition (PT): skilled nursing facility                          Electronically signed by Rosio Fam at 24 1337       Rosio Fam at 24 1338  Version 1 of 1         Patient Name: Catalina Moreno  : 1940    MRN: [id number]                              Today's Date: 2024       Admit Date: 2024    Visit Dx:     ICD-10-CM ICD-9-CM   1. Atrial fibrillation with RVR  I48.91 427.31   2. Generalized weakness  R53.1 780.79     Patient Active Problem List   Diagnosis    Arthritis    Depressive disorder    Primary fibromyalgia syndrome    Hypothyroidism    Atrial fibrillation with RVR    Stage 3a chronic kidney disease    Chronic low back pain    Age-related cognitive decline    Generalized anxiety disorder    Polyneuropathy, unspecified    Restless legs syndrome    Paroxysmal atrial fibrillation    Essential (primary) hypertension    Gastro-esophageal reflux disease without esophagitis    Pulmonary hypertension, unspecified    Coronary artery disease involving native coronary artery of native heart without angina pectoris    Presence of cardiac pacemaker    Chronic HFrEF (heart failure with reduced ejection fraction)    Atrial flutter with rapid ventricular response    Weakness    AF (atrial fibrillation)    Chronic heart failure with preserved ejection fraction (HFpEF)    Fall    Nasal bones, closed fracture    Iron deficiency anemia    Atrial fibrillation     Past Medical History:   Diagnosis Date    Acquired spondylolisthesis of lumbosacral region     Acute kidney injury 2022    Anxiety     Arthritis     Atrial " fibrillation     paroxysmal    Broken shoulder     left-- due to fall 11-7-19 was at [facility]    Chronic pain disorder     Chronic systolic CHF (congestive heart failure) 01/05/2023    EF 36-40%    Coronary artery disease involving native coronary artery of native heart without angina pectoris 04/02/2021    nonobstructive    DDD (degenerative disc disease), cervical     DDD (degenerative disc disease), lumbar     Depression     Edema     9/2020 foot    GERD (gastroesophageal reflux disease)     H/O fall     Heart failure with mid-range ejection fraction 03/05/2022    EF 45% per 2D echo    Hypertension     Hypothyroidism     Insomnia     Low back pain     Moderate mitral regurgitation 01/05/2023    Neuropathy     Nonrheumatic tricuspid valve regurgitation     Pain in both feet     Polypharmacy     PONV (postoperative nausea and vomiting)     Pulmonary hypertension     Radiculopathy     Restless legs     Sacroiliitis     Sick sinus syndrome     Added automatically from request for surgery [id number]    Spondylolisthesis     Stage 3a chronic kidney disease     Urinary incontinence     Vitamin D deficiency      Past Surgical History:   Procedure Laterality Date    BACK SURGERY  07/19/2018    PDC &  PSF L3-L5 insitu    CARDIAC CATHETERIZATION N/A 4/2/2021    Procedure: Cardiac Catheterization/Vascular Study;  Surgeon: Barrett Evans MD;  Location: [facility] CATH INVASIVE LOCATION;  Service: Cardiovascular;  Laterality: N/A;    CARDIAC ELECTROPHYSIOLOGY PROCEDURE N/A 1/17/2023    Procedure: Pacemaker DC new;  Surgeon: Baljeet Valero MD;  Location: [facility] CATH INVASIVE LOCATION;  Service: Cardiovascular;  Laterality: N/A;    CHOLECYSTECTOMY      COLONOSCOPY      COLONOSCOPY N/A 7/10/2024    Procedure: COLONOSCOPY with cold snare polypectomy x1 and endoscopic clipping x2;  Surgeon: MARLIN Vanegas MD;  Location: [facility] ENDOSCOPY;  Service: Gastroenterology;  Laterality: N/A;  Post- colon polyps, diverticulosis     ENDOSCOPY N/A 9/14/2023    Procedure: ESOPHAGOGASTRODUODENOSCOPY;  Surgeon: Ulysses Lamas MD;  Location: [facility] ENDOSCOPY;  Service: Gastroenterology;  Laterality: N/A;  gastritis, inflammatory gastric polyp    ENDOSCOPY N/A 7/10/2024    Procedure: ESOPHAGOGASTRODUODENOSCOPY;  Surgeon: MARLIN Vanegas MD;  Location: [facility] ENDOSCOPY;  Service: Gastroenterology;  Laterality: N/A;  Post- hiatal hernia, gastric polyps, gastritis    HYSTERECTOMY      ROTATOR CUFF REPAIR Left       General Information       Row Name 07/25/24 1318          Physical Therapy Time and Intention    Document Type evaluation  -JV     Mode of Treatment physical therapy  -JV       Row Name 07/25/24 1318          General Information    Patient Profile Reviewed yes  -JV     Prior Level of Function independent:;all household mobility;community mobility;ADL's;driving  with Rwx  -JV     Existing Precautions/Restrictions fall;oxygen therapy device and L/min;orthostatic hypotension  -JV     Barriers to Rehab medically complex;previous functional deficit;environmental barriers  limited social support  -JV       Row Name 07/25/24 1318          Living Environment    People in Home alone  -JV       Row Name 07/25/24 1318          Home Main Entrance    Number of Stairs, Main Entrance one  -JV       Row Name 07/25/24 1318          Stairs Within Home, Primary    Number of Stairs, Within Home, Primary none  -JV       Row Name 07/25/24 1318          Cognition    Orientation Status (Cognition) oriented x 4  -JV       Row Name 07/25/24 1318          Safety Issues, Functional Mobility    Impairments Affecting Function (Mobility) balance;endurance/activity tolerance  -JV               User Key  (r) = Recorded By, (t) = Taken By, (c) = Cosigned By      Initials Name Provider Type    JV Rosio Fam Physical Therapist                   Mobility       Row Name 07/25/24 1324          Bed Mobility    Bed Mobility supine-sit  -JV     Supine-Sit  [place] (Bed Mobility) contact guard;verbal cues  -JV     Assistive Device (Bed Mobility) bed rails;head of bed elevated  -JV       Row Name 07/25/24 1324          Bed-Chair Transfer    Bed-Chair [place] (Transfers) minimum assist (75% patient effort);verbal cues;nonverbal cues (demo/gesture)  -JV     Assistive Device (Bed-Chair Transfers) walker, front-wheeled  -JV       Row Name 07/25/24 1324          Sit-Stand Transfer    Sit-Stand [place] (Transfers) verbal cues;nonverbal cues (demo/gesture);minimum assist (75% patient effort)  -JV     Assistive Device (Sit-Stand Transfers) walker, front-wheeled  -JV     Comment, (Sit-Stand Transfer) CGA from elevated surfaces; MIN A from standard commode  -JV       Row Name 07/25/24 1324          Gait/Stairs (Locomotion)    [place] Level (Gait) verbal cues;nonverbal cues (demo/gesture);contact guard  -JV     Assistive Device (Gait) walker, front-wheeled  -JV     Patient was able to Ambulate yes  -JV     Distance in Feet (Gait) 80  -JV     Deviations/Abnormal Patterns (Gait) chad decreased;gait speed decreased  -JV     Bilateral Gait Deviations forward flexed posture  -JV     Comment, (Gait/Stairs) Pt with s/s hypotension post gait.  -JV               User Key  (r) = Recorded By, (t) = Taken By, (c) = Cosigned By      Initials Name Provider Type    JRosio Ceja Physical Therapist                   Obj/Interventions       Row Name 07/25/24 1327          Range of Motion Comprehensive    General Range of Motion bilateral lower extremity ROM WFL  -JV       Row Name 07/25/24 1327          Strength Comprehensive (MMT)    Comment, General Manual Muscle Testing (MMT) Assessment BLE grossly 4/5  -JV       Row Name 07/25/24 1327          Balance    Balance Assessment standing static balance;standing dynamic balance  -JV     Static Standing Balance contact guard  -JV     Dynamic Standing Balance minimal assist  -JV     Position/Device Used, Standing Balance  supported;walker, rolling  -JV       Row Name 07/25/24 1327          Sensory Assessment (Somatosensory)    Sensory Assessment (Somatosensory) LE sensation intact;other (see comments)  exception ELIZABETH feet numb  -JV               User Key  (r) = Recorded By, (t) = Taken By, (c) = Cosigned By      Initials Name Provider Type    Rosio Ribeiro Physical Therapist                   Goals/Plan       Row Name 07/25/24 1336          Bed Mobility Goal 1 (PT)    Activity/Assistive Device (Bed Mobility Goal 1, PT) bed mobility activities, all  -JV     [place] Level/Cues Needed (Bed Mobility Goal 1, PT) modified independence  -JV     Time Frame (Bed Mobility Goal 1, PT) long term goal (LTG);2 weeks  -JV       Row Name 07/25/24 1336          Transfer Goal 1 (PT)    Activity/Assistive Device (Transfer Goal 1, PT) sit-to-stand/stand-to-sit;bed-to-chair/chair-to-bed;walker, rolling  -JV     [place] Level/Cues Needed (Transfer Goal 1, PT) modified independence  -JV     Time Frame (Transfer Goal 1, PT) long term goal (LTG);2 weeks  -JV       Row Name 07/25/24 1336          Gait Training Goal 1 (PT)    Activity/Assistive Device (Gait Training Goal 1, PT) gait (walking locomotion);assistive device use;walker, rolling  -JV     [place] Level (Gait Training Goal 1, PT) modified independence  -JV     Distance (Gait Training Goal 1, PT) 100 ft  -JV     Time Frame (Gait Training Goal 1, PT) long term goal (LTG);2 weeks  -JV       Row Name 07/25/24 1336          Therapy Assessment/Plan (PT)    Planned Therapy Interventions (PT) balance training;bed mobility training;gait training;home exercise program;patient/family education;strengthening;transfer training  -JV               User Key  (r) = Recorded By, (t) = Taken By, (c) = Cosigned By      Initials Name Provider Type    Rosio Ribeiro Physical Therapist                   Clinical Impression       Row Name 07/25/24 1327          Pain    Pretreatment Pain Rating  "8/10  -JV     Posttreatment Pain Rating 8/10  -JV     Pain Location - Side/Orientation Bilateral  -JV     Pain Location lower  -JV     Pain Location - extremity  -JV       Row Name 07/25/24 1017          Plan of Care Review    Outcome Evaluation Pt is an 83 y/o female presenting to [facility] on 7/24 with c/o generalized weakness and palpitations. In ED, pt with A fib with RVR and started on cardizem drip. 7/24: chest XR and CT head (-) acute changes.   PMH of PAF, CAD. At time of therapy eval, pt A&Ox4; pt reports she lives alone in [facility] with one step to enter; reports PLOF of Mod(I) with RWx for household/community mobility/ambulation. Pt uses shower chair. She reports she is still driving and able to walk within grocery store.   Pt reports fall and hitting her head within last two months. Pt with limited social support; her neighbor reports concern that pt with increased confusion including walking in street and needing increased support.   New reliance on supplemental O2(2L NC when with therapy entered room, SaO2 93%).  Supine /67 mmHg, pulse 72 bpm.   Sitting /63 mmHg,; Pt with s/s hypotension post gait -- standing BP post gait 97/55 mmHg, pulse 72-86 bpm, SaO2 94% with room air.   BLE pain 8/10, but reports it is improved since last night. At time of therapy eval, pt completes bed mobility with CGA, requires MIN A with sit to stand with Rwx from stanrdard height surfaces, and ambulates x80 ft with Rwx and CGA, but does report feeling weak and \"funny headed\" - orthostatic hypotension noted. Pt reuqires increased time to complete transfer to/from commode. Due to functional decline, environmental barriers, fall risk / decreased safety awareness, and limited social support, follow-up SNF recommended at time of d/c from [facility].  -JV       Row Name 07/25/24 8531          Therapy Assessment/Plan (PT)    Criteria for Skilled Interventions Met (PT) yes;meets criteria;skilled treatment is necessary  -JV     Therapy " Frequency (PT) 5 times/wk  -JV     Predicted Duration of Therapy Intervention (PT) until d/c  -JV       Row Name 07/25/24 1327          Vital Signs    Pre Systolic BP Rehab 123  -JV     Pre Treatment Diastolic BP 67  -JV     Intra Systolic BP Rehab 118  -JV     Intra Treatment Diastolic BP 63  -JV     Post Systolic BP Rehab 97  -JV     Post Treatment Diastolic BP 55  -JV     Intratreatment Heart Rate (beats/min) 72  -JV     Posttreatment Heart Rate (beats/min) 86  -JV     Pre Patient Position Supine  -JV     Intra Patient Position Sitting  -JV     Post Patient Position Standing  -JV       Row Name 07/25/24 1327          Positioning and Restraints    Pre-Treatment Position in bed  -JV     Post Treatment Position chair  -JV     In Chair notified nsg;reclined;call light within reach;encouraged to call for assist;exit alarm on  -JV               User Key  (r) = Recorded By, (t) = Taken By, (c) = Cosigned By      Initials Name Provider Type    Rosio Ribeiro Physical Therapist                   Outcome Measures       Row Name 07/25/24 0800          How much help from another person do you currently need...    Turning from your back to your side while in flat bed without using bedrails? 4  -CL     Moving from lying on back to sitting on the side of a flat bed without bedrails? 4  -CL     Moving to and from a bed to a chair (including a wheelchair)? 3  -CL     Standing up from a chair using your arms (e.g., wheelchair, bedside chair)? 3  -CL     Climbing 3-5 steps with a railing? 3  -CL     To walk in hospital room? 3  -CL     AM-PAC 6 Clicks Score (PT) 20  -CL     Highest Level of Mobility Goal 6 --> Walk 10 steps or more  -CL               User Key  (r) = Recorded By, (t) = Taken By, (c) = Cosigned By      Initials Name Provider Type    Tierra Castro, RN Registered Nurse                                 Physical Therapy Education       Title: PT OT SLP Therapies (Done)       Topic: Physical Therapy (Done)   "     Point: Mobility training (Done)       Learning Progress Summary             Patient Acceptance, E,TB, VU by CHRISTY at 7/25/2024 2357                                         User Key       Initials Effective Dates Name Provider Type Discipline    CHRISTY 03/30/21 -  Rosio Fam Physical Therapist PT                  PT Recommendation and Plan  Planned Therapy Interventions (PT): balance training, bed mobility training, gait training, home exercise program, patient/family education, strengthening, transfer training  Outcome Evaluation: Pt is an 83 y/o female presenting to [facility] on 7/24 with c/o generalized weakness and palpitations. In ED, pt with A fib with RVR and started on cardizem drip. 7/24: chest XR and CT head (-) acute changes.   PMH of PAF, CAD. At time of therapy eval, pt A&Ox4; pt reports she lives alone in [place] with one step to enter; reports PLOF of Mod(I) with RWx for household/community mobility/ambulation. Pt uses shower chair. She reports she is still driving and able to walk within grocery store.   Pt reports fall and hitting her head within last two months. Pt with limited social support; her neighbor reports concern that pt with increased confusion including walking in street and needing increased support.   New reliance on supplemental O2(2L NC when with therapy entered room, SaO2 93%).  Supine /67 mmHg, pulse 72 bpm.   Sitting /63 mmHg,; Pt with s/s hypotension post gait -- standing BP post gait 97/55 mmHg, pulse 72-86 bpm, SaO2 94% with room air.   BLE pain 8/10, but reports it is improved since last night. At time of therapy eval, pt completes bed mobility with CGA, requires MIN A with sit to stand with Rwx from stanrdard height surfaces, and ambulates x80 ft with Rwx and CGA, but does report feeling weak and \"funny headed\" - orthostatic hypotension noted. Pt reuqires increased time to complete transfer to/from commode. Due to functional decline, environmental barriers, fall risk / " decreased safety awareness, and limited social support, follow-up SNF recommended at time of d/c from [facility].     Time Calculation:         PT Charges       Row Name 24 1337             Time Calculation    Start Time 1031  -JV      Stop Time 1110  -JV      Time Calculation (min) 39 min  -JV      PT Received On 24  -JV      PT - Next Appointment 24  -JV      PT Goal Re-Cert Due Date 24  -JV         Time Calculation- PT    Total Timed Code Minutes- PT 10 minute(s)  -JV                User Key  (r) = Recorded By, (t) = Taken By, (c) = Cosigned By      Initials Name Provider Type    Rosio Ribeiro Physical Therapist                  Therapy Charges for Today       Code Description Service Date Service Provider Modifiers Qty    25997761549 HC PT EVAL MOD COMPLEXITY 4 2024 Rosio Fam GP 1    20725593864 HC PT THERAPEUTIC ACT EA 15 MIN 2024 Rosio Fam GP 1            PT G-Codes  AM-PAC 6 Clicks Score (PT): 20  PT Discharge Summary  Anticipated Discharge Disposition (PT): skilled nursing facility    Rosio Fam  2024      Electronically signed by Rosio Fam at 24 1338          Occupational Therapy Notes (last 24 hours)        Kamaljit Dumont, OT at 24 1627          Patient Name: Catalina Moreno  : 1940    MRN: [id number]                              Today's Date: 2024       Admit Date: 2024    Visit Dx:     ICD-10-CM ICD-9-CM   1. Atrial fibrillation with RVR  I48.91 427.31   2. Generalized weakness  R53.1 780.79     Patient Active Problem List   Diagnosis    Arthritis    Depressive disorder    Primary fibromyalgia syndrome    Hypothyroidism    Atrial fibrillation with RVR    Stage 3a chronic kidney disease    Chronic low back pain    Age-related cognitive decline    Generalized anxiety disorder    Polyneuropathy, unspecified    Restless legs syndrome    Paroxysmal atrial fibrillation    Essential (primary) hypertension     Gastro-esophageal reflux disease without esophagitis    Pulmonary hypertension, unspecified    Coronary artery disease involving native coronary artery of native heart without angina pectoris    Presence of cardiac pacemaker    Chronic HFrEF (heart failure with reduced ejection fraction)    Atrial flutter with rapid ventricular response    Weakness    AF (atrial fibrillation)    Chronic heart failure with preserved ejection fraction (HFpEF)    Fall    Nasal bones, closed fracture    Iron deficiency anemia    Atrial fibrillation     Past Medical History:   Diagnosis Date    Acquired spondylolisthesis of lumbosacral region     Acute kidney injury 07/22/2022    Anxiety     Arthritis     Atrial fibrillation     paroxysmal    Broken shoulder     left-- due to fall 11-7-19 was at [facility]    Chronic pain disorder     Chronic systolic CHF (congestive heart failure) 01/05/2023    EF 36-40%    Coronary artery disease involving native coronary artery of native heart without angina pectoris 04/02/2021    nonobstructive    DDD (degenerative disc disease), cervical     DDD (degenerative disc disease), lumbar     Depression     Edema     9/2020 foot    GERD (gastroesophageal reflux disease)     H/O fall     Heart failure with mid-range ejection fraction 03/05/2022    EF 45% per 2D echo    Hypertension     Hypothyroidism     Insomnia     Low back pain     Moderate mitral regurgitation 01/05/2023    Neuropathy     Nonrheumatic tricuspid valve regurgitation     Pain in both feet     Polypharmacy     PONV (postoperative nausea and vomiting)     Pulmonary hypertension     Radiculopathy     Restless legs     Sacroiliitis     Sick sinus syndrome     Added automatically from request for surgery [id number]    Spondylolisthesis     Stage 3a chronic kidney disease     Urinary incontinence     Vitamin D deficiency      Past Surgical History:   Procedure Laterality Date    BACK SURGERY  07/19/2018    PDC &  PSF L3-L5 insitu    CARDIAC  CATHETERIZATION N/A 4/2/2021    Procedure: Cardiac Catheterization/Vascular Study;  Surgeon: Barrett Evans MD;  Location: [facility] CATH INVASIVE LOCATION;  Service: Cardiovascular;  Laterality: N/A;    CARDIAC ELECTROPHYSIOLOGY PROCEDURE N/A 1/17/2023    Procedure: Pacemaker DC new;  Surgeon: Baljeet Valero MD;  Location: [facility] CATH INVASIVE LOCATION;  Service: Cardiovascular;  Laterality: N/A;    CHOLECYSTECTOMY      COLONOSCOPY      COLONOSCOPY N/A 7/10/2024    Procedure: COLONOSCOPY with cold snare polypectomy x1 and endoscopic clipping x2;  Surgeon: MARLIN Vanegas MD;  Location: [facility] ENDOSCOPY;  Service: Gastroenterology;  Laterality: N/A;  Post- colon polyps, diverticulosis    ENDOSCOPY N/A 9/14/2023    Procedure: ESOPHAGOGASTRODUODENOSCOPY;  Surgeon: Ulysses Lamas MD;  Location: [facility] ENDOSCOPY;  Service: Gastroenterology;  Laterality: N/A;  gastritis, inflammatory gastric polyp    ENDOSCOPY N/A 7/10/2024    Procedure: ESOPHAGOGASTRODUODENOSCOPY;  Surgeon: MARLIN Vanegas MD;  Location: [facility] ENDOSCOPY;  Service: Gastroenterology;  Laterality: N/A;  Post- hiatal hernia, gastric polyps, gastritis    HYSTERECTOMY      ROTATOR CUFF REPAIR Left       General Information       Row Name 07/25/24 1401          OT Time and Intention    Document Type evaluation  -LS     Mode of Treatment occupational therapy  -LS       Row Name 07/25/24 1401          General Information    Patient Profile Reviewed yes  -LS     Prior Level of Function independent:;ADL's;community mobility;driving  Rolling walker  -LS     Existing Precautions/Restrictions fall;oxygen therapy device and L/min;orthostatic hypotension  -LS     Barriers to Rehab medically complex;previous functional deficit  -LS       Row Name 07/25/24 1401          Living Environment    People in Home alone  -LS       Row Name 07/25/24 1401          Home Main Entrance    Number of Stairs, Main Entrance one  -LS       Row Name 07/25/24  1401          Stairs Within Home, Primary    Number of Stairs, Within Home, Primary none  -LS       Row Name 07/25/24 1401          Cognition    Orientation Status (Cognition) oriented x 4  -LS       Row Name 07/25/24 1401          Safety Issues, Functional Mobility    Impairments Affecting Function (Mobility) balance;endurance/activity tolerance;pain  -LS               User Key  (r) = Recorded By, (t) = Taken By, (c) = Cosigned By      Initials Name Provider Type    Kamaljit Kat OT Occupational Therapist                     Mobility/ADL's       Row Name 07/25/24 1401          Bed Mobility    Bed Mobility supine-sit  -LS     Supine-Sit [place] (Bed Mobility) contact guard;verbal cues  -LS     Assistive Device (Bed Mobility) bed rails;head of bed elevated  -LS       Row Name 07/25/24 1401          Transfers    Transfers sit-stand transfer;toilet transfer  -LS       Row Name 07/25/24 1401          Bed-Chair Transfer    Bed-Chair [place] (Transfers) minimum assist (75% patient effort);verbal cues  -LS     Assistive Device (Bed-Chair Transfers) walker, front-wheeled  -LS       Row Name 07/25/24 1401          Sit-Stand Transfer    Sit-Stand [place] (Transfers) verbal cues;minimum assist (75% patient effort)  -LS     Assistive Device (Sit-Stand Transfers) walker, front-wheeled  -LS       Row Name 07/25/24 1401          Toilet Transfer    Type (Toilet Transfer) sit-stand;stand-sit  -LS     [place] Level (Toilet Transfer) minimum assist (75% patient effort);verbal cues  -LS     Assistive Device (Toilet Transfer) walker, 4-wheeled  -LS       Row Name 07/25/24 1401          Functional Mobility    Functional Mobility- Ind. Level contact guard assist  -LS     Functional Mobility- Device walker, front-wheeled  -LS     Patient was able to Ambulate yes  -LS       Row Name 07/25/24 1401          Activities of Daily Living    BADL Assessment/Intervention toileting;lower body dressing  -       Row Name  07/25/24 1401          Toileting Assessment/Training    [place] Level (Toileting) toileting skills;contact guard assist  -LS     Position (Toileting) supported sitting  -LS       Row Name 07/25/24 1401          Lower Body Dressing Assessment/Training    [place] Level (Lower Body Dressing) set up;lower body dressing skills  -LS     Position (Lower Body Dressing) edge of bed sitting  -LS               User Key  (r) = Recorded By, (t) = Taken By, (c) = Cosigned By      Initials Name Provider Type    LS Kamaljit Dumont OT Occupational Therapist                   Obj/Interventions       Row Name 07/25/24 1404          Sensory Assessment (Somatosensory)    Sensory Assessment (Somatosensory) sensation intact  -LS       Row Name 07/25/24 1404          Vision Assessment/Intervention    Visual Impairment/Limitations WFL  -LS       Row Name 07/25/24 1404          Range of Motion Comprehensive    General Range of Motion bilateral upper extremity ROM WFL  -LS       Row Name 07/25/24 1404          Strength Comprehensive (MMT)    Comment, General Manual Muscle Testing (MMT) Assessment BUE grossly 4/5  -LS       Row Name 07/25/24 1404          Balance    Balance Assessment sitting static balance;sitting dynamic balance;standing static balance;standing dynamic balance  -LS     Static Sitting Balance independent  -LS     Dynamic Sitting Balance independent  -LS     Position, Sitting Balance sitting edge of bed  -LS     Static Standing Balance contact guard  -LS     Dynamic Standing Balance minimal assist  -LS     Position/Device Used, Standing Balance supported;walker, front-wheeled  -LS               User Key  (r) = Recorded By, (t) = Taken By, (c) = Cosigned By      Initials Name Provider Type    LS Kamaljit Dumont OT Occupational Therapist                   Goals/Plan       Row Name 07/25/24 1625          Bed Mobility Goal 1 (OT)    Activity/Assistive Device (Bed Mobility Goal 1, OT) bed mobility activities, all  -LS      [place] Level/Cues Needed (Bed Mobility Goal 1, OT) modified independence  -LS     Time Frame (Bed Mobility Goal 1, OT) long term goal (LTG);2 weeks  -       Row Name 07/25/24 1625          Transfer Goal 1 (OT)    Activity/Assistive Device (Transfer Goal 1, OT) transfers, all  -LS     [place] Level/Cues Needed (Transfer Goal 1, OT) modified independence  -LS     Time Frame (Transfer Goal 1, OT) long term goal (LTG);2 weeks  -LS       Row Name 07/25/24 1625          Dressing Goal 1 (OT)    Activity/Device (Dressing Goal 1, OT) dressing skills, all  -LS     [place]/Cues Needed (Dressing Goal 1, OT) modified independence  -LS     Time Frame (Dressing Goal 1, OT) long term goal (LTG);2 weeks  -LS       Row Name 07/25/24 1629          Toileting Goal 1 (OT)    Activity/Device (Toileting Goal 1, OT) toileting skills, all  -LS     [place] Level/Cues Needed (Toileting Goal 1, OT) modified independence  -LS     Time Frame (Toileting Goal 1, OT) long term goal (LTG);2 weeks  -       Row Name 07/25/24 1627          Therapy Assessment/Plan (OT)    Planned Therapy Interventions (OT) activity tolerance training;BADL retraining;functional balance retraining;IADL retraining;occupation/activity based interventions;ROM/therapeutic exercise;strengthening exercise;transfer/mobility retraining;patient/caregiver education/training  -               User Key  (r) = Recorded By, (t) = Taken By, (c) = Cosigned By      Initials Name Provider Type    LS Kamaljit Dumont OT Occupational Therapist                   Clinical Impression       Row Name 07/25/24 1839          Pain Assessment    Pretreatment Pain Rating 8/10  -LS     Posttreatment Pain Rating 8/10  -LS     Pain Location - Side/Orientation Bilateral  -LS     Pain Location lower  -LS     Pain Location - extremity  -LS     Pain Intervention(s) Repositioned;Ambulation/increased activity  -       Row Name 07/25/24 9172          Plan of Care Review    Plan of Care  Reviewed With patient  -LS     Outcome Evaluation Pt is an 83 y/o F admitted to [facility] 7/24/24 with c/o general weakness and palpitations. Hospital dx A.fib with RVR.  PMHx significant for CKDIII, HTN, pacemaker in place, depressive disorder, fibromyalgia, CHF, hx falls, DDD, and polyneuropathy. At baseline pt resides alone in [facility] with 0 VLADIMIR. Pt typically (I) with ADLs and uses a quad cane or RW for mobility. Pt uses shower chair. She reports she is still driving and able to walk within grocery store. Pt reports fall and hitting her head within last two months. Pt with limited social support; her neighbor reports concern that pt with increased confusion including walking in street and needing increased support. This date, pt completes bed mobility with CGA, requires MIN A with sit to stand from bed and commode. Setup provided for lower body dressing and supervision for toileting. Of note, pt did describe dizziness after ambulation and ADL performance. Supine /67 mmHg. Sitting /63 mmHg. Pt with s/s hypotension post gait -- standing BP post gait 97/55 mmHg. Ot feels pt unsafe for dc home alone at this time, as she is weak and below baseline. Recommending SNF when medically stable.  -       Row Name 07/25/24 1604          Therapy Assessment/Plan (OT)    Rehab Potential (OT) good, to achieve stated therapy goals  -     Criteria for Skilled Therapeutic Interventions Met (OT) yes;skilled treatment is necessary  -     Therapy Frequency (OT) 3 times/wk  -     Predicted Duration of Therapy Intervention (OT) until dc  -       Row Name 07/25/24 1604          Vital Signs    Pre Systolic BP Rehab 123  -LS     Pre Treatment Diastolic BP 67  -LS     Intra Systolic BP Rehab 118  -LS     Intra Treatment Diastolic BP 63  -LS     Post Systolic BP Rehab 97  -LS     Post Treatment Diastolic BP 55  -LS     Pre SpO2 (%) 94  2L  -LS     O2 Delivery Pre Treatment nasal cannula  -LS     Intra SpO2 (%) 95  -LS     O2  Delivery Intra Treatment room air  -LS     Post SpO2 (%) 98  -LS     O2 Delivery Post Treatment room air  -LS     Pre Patient Position Supine  -LS     Intra Patient Position Sitting  -LS     Post Patient Position Standing  -LS       Row Name 07/25/24 1604          Positioning and Restraints    Pre-Treatment Position in bed  -LS     Post Treatment Position chair  -LS     In Chair notified nsg;reclined;call light within reach;encouraged to call for assist;exit alarm on  -LS               User Key  (r) = Recorded By, (t) = Taken By, (c) = Cosigned By      Initials Name Provider Type    Kamaljit Kat OT Occupational Therapist                   Outcome Measures       Row Name 07/25/24 1625          How much help from another is currently needed...    Putting on and taking off regular lower body clothing? 3  -LS     Bathing (including washing, rinsing, and drying) 3  -LS     Toileting (which includes using toilet bed pan or urinal) 3  -LS     Putting on and taking off regular upper body clothing 3  -LS     Taking care of personal grooming (such as brushing teeth) 4  -LS     Eating meals 4  -LS     AM-PAC 6 Clicks Score (OT) 20  -LS       Row Name 07/25/24 0800          How much help from another person do you currently need...    Turning from your back to your side while in flat bed without using bedrails? 4  -CL     Moving from lying on back to sitting on the side of a flat bed without bedrails? 4  -CL     Moving to and from a bed to a chair (including a wheelchair)? 3  -CL     Standing up from a chair using your arms (e.g., wheelchair, bedside chair)? 3  -CL     Climbing 3-5 steps with a railing? 3  -CL     To walk in hospital room? 3  -CL     AM-PAC 6 Clicks Score (PT) 20  -CL     Highest Level of Mobility Goal 6 --> Walk 10 steps or more  -CL       Row Name 07/25/24 1625          Functional Assessment    Outcome Measure Options AM-PAC 6 Clicks Daily Activity (OT)  -LS               User Key  (r) = Recorded By, (t)  = Taken By, (c) = Cosigned By      Initials Name Provider Type    CL Tierra Mello, RN Registered Nurse    Kamaljit Kat OT Occupational Therapist                    Occupational Therapy Education       Title: PT OT SLP Therapies (Done)       Topic: Occupational Therapy (Done)       Point: ADL training (Done)       Description:   Instruct learner(s) on proper safety adaptation and remediation techniques during self care or transfers.   Instruct in proper use of assistive devices.                  Learning Progress Summary             Patient Acceptance, E,TB, VU by  at 7/25/2024 4844                                         User Key       Initials Effective Dates Name Provider Type Discipline    LILY 09/22/22 -  Kamaljit Dumont OT Occupational Therapist OT                  OT Recommendation and Plan  Planned Therapy Interventions (OT): activity tolerance training, BADL retraining, functional balance retraining, IADL retraining, occupation/activity based interventions, ROM/therapeutic exercise, strengthening exercise, transfer/mobility retraining, patient/caregiver education/training  Therapy Frequency (OT): 3 times/wk  Plan of Care Review  Plan of Care Reviewed With: patient  Outcome Evaluation: [facility] 83 [facility] 7/24/24 with c/o general weakness and palpitations. Hospital dx A.fib with RVR.  PMHx significant for CKDIII, HTN, pacemaker in place, depressive disorder, fibromyalgia, CHF, hx falls, DDD, and polyneuropathy. At baseline pt resides alone in [facility] with 0 VLADIMIR. Pt typically (I) with ADLs and uses a quad cane or RW for mobility. Pt uses shower chair. She reports she is still driving and able to walk within grocery store. Pt reports fall and hitting her head within last two months. Pt with limited social support; her neighbor reports concern that pt with increased confusion including walking in street and needing increased support. This date, pt completes bed mobility with CGA, requires MIN  A with sit to stand from bed and commode. Setup provided for lower body dressing and supervision for toileting. Of note, pt did describe dizziness after ambulation and ADL performance. Supine /67 mmHg. Sitting /63 mmHg. Pt with s/s hypotension post gait -- standing BP post gait 97/55 mmHg. Ot feels pt unsafe for dc home alone at this time, as she is weak and below baseline. Recommending SNF when medically stable.     Time Calculation:         Time Calculation- OT       Row Name 07/25/24 1626             Time Calculation- OT    OT Start Time 1031  -LS      OT Stop Time 1111  -LS      OT Time Calculation (min) 40 min  -LS      Total Timed Code Minutes- OT 11 minute(s)  -LS      OT Received On 07/25/24  -      OT - Next Appointment 07/26/24  -      OT Goal Re-Cert Due Date 07/26/24  -         Timed Charges    17019 - OT Self Care/Mgmt Minutes 11  -LS         Untimed Charges    OT Eval/Re-eval Minutes 29  -LS         Total Minutes    Timed Charges Total Minutes 11  -LS      Untimed Charges Total Minutes 29  -LS       Total Minutes 40  -LS                User Key  (r) = Recorded By, (t) = Taken By, (c) = Cosigned By      Initials Name Provider Type    LS Kamaljit Dumont OT Occupational Therapist                  Therapy Charges for Today       Code Description Service Date Service Provider Modifiers Qty    55051557814 HC OT SELF CARE/MGMT/TRAIN EA 15 MIN 7/25/2024 Kamaljit Dumont OT GO 1    [id number]  OT EVAL MOD COMPLEXITY 4 7/25/2024 Kamaljit Dumont OT GO 1                 Kamaljit Dumont OT  7/25/2024    Electronically signed by Kamaljit Dumont OT at 07/25/24 1627       Kamaljit Dumont OT at 07/25/24 1625          Goal Outcome Evaluation:  Plan of Care Reviewed With: patient           Outcome Evaluation: Pt is an 85 y/o F admitted to [facility] 7/24/24 with c/o general weakness and palpitations. Hospital dx A.fib with RVR.  PMHx significant for CKDIII, HTN, pacemaker in place, depressive disorder,  fibromyalgia, CHF, hx falls, DDD, and polyneuropathy. At baseline pt resides alone in [facility] with 0 VLADIMIR. Pt typically (I) with ADLs and uses a quad cane or RW for mobility. Pt uses shower chair. She reports she is still driving and able to walk within grocery store. Pt reports fall and hitting her head within last two months. Pt with limited social support; her neighbor reports concern that pt with increased confusion including walking in street and needing increased support. This date, pt completes bed mobility with CGA, requires MIN A with sit to stand from bed and commode. Setup provided for lower body dressing and supervision for toileting. Of note, pt did describe dizziness after ambulation and ADL performance. Supine /67 mmHg. Sitting /63 mmHg. Pt with s/s hypotension post gait -- standing BP post gait 97/55 mmHg. Ot feels pt unsafe for dc home alone at this time, as she is weak and below baseline. Recommending SNF when medically stable.                                 Electronically signed by Kamaljit Dumont OT at 07/25/24 1456

## 2024-07-26 NOTE — PLAN OF CARE
Problem: Adult Inpatient Plan of Care  Goal: Absence of Hospital-Acquired Illness or Injury  Intervention: Identify and Manage Fall Risk  Recent Flowsheet Documentation  Taken 7/26/2024 0600 by Ora Fuller LPN  Safety Promotion/Fall Prevention:   safety round/check completed   room organization consistent   assistive device/personal items within reach   clutter free environment maintained  Taken 7/26/2024 0400 by Ora Fuller LPN  Safety Promotion/Fall Prevention:   safety round/check completed   room organization consistent   clutter free environment maintained   assistive device/personal items within reach  Taken 7/26/2024 0200 by Ora Fuller LPN  Safety Promotion/Fall Prevention:   safety round/check completed   room organization consistent   assistive device/personal items within reach   clutter free environment maintained  Taken 7/26/2024 0000 by Ora Fuller LPN  Safety Promotion/Fall Prevention:   safety round/check completed   room organization consistent   clutter free environment maintained   assistive device/personal items within reach  Taken 7/25/2024 2200 by Ora Fuller LPN  Safety Promotion/Fall Prevention:   safety round/check completed   room organization consistent   assistive device/personal items within reach   clutter free environment maintained  Taken 7/25/2024 2000 by Ora Fuller LPN  Safety Promotion/Fall Prevention:   safety round/check completed   room organization consistent   clutter free environment maintained   assistive device/personal items within reach     Problem: Adult Inpatient Plan of Care  Goal: Absence of Hospital-Acquired Illness or Injury  Intervention: Prevent and Manage VTE (Venous Thromboembolism) Risk  Recent Flowsheet Documentation  Taken 7/26/2024 0400 by Ora Fuller LPN  Activity Management: activity encouraged  VTE Prevention/Management: (eliquis) other (see comments)  Range of Motion: active ROM (range of motion)  encouraged  Taken 7/26/2024 0000 by Ora Fuller LPN  Activity Management: activity encouraged  VTE Prevention/Management: (eliquis) other (see comments)  Taken 7/25/2024 2000 by Ora Fuller LPN  Activity Management: activity encouraged  VTE Prevention/Management: (eliquis) other (see comments)  Range of Motion: active ROM (range of motion) encouraged     Problem: Adult Inpatient Plan of Care  Goal: Absence of Hospital-Acquired Illness or Injury  Intervention: Prevent Skin Injury  Recent Flowsheet Documentation  Taken 7/26/2024 0400 by Ora Fuller LPN  Body Position: position changed independently  Skin Protection: adhesive use limited  Taken 7/26/2024 0000 by Ora Fuller LPN  Body Position: position changed independently  Taken 7/25/2024 2000 by Ora Fuller LPN  Body Position: position changed independently   Goal Outcome Evaluation:

## 2024-07-26 NOTE — THERAPY TREATMENT NOTE
"Subjective: Pt agreeable to therapeutic plan of care.    Objective:     Bed mobility - CGA  Transfers - CGA and with rolling walker  Ambulation - 80 feet x2 CGA and with rolling walker    Therapeutic Exercise - 10 Reps B LE AROM supported sitting / chair    Vitals: WNL  SITTING /54 mmHg, pulse 104 bpm, SaO2 99% with room air;  STANDING /62 mmHg,  STANDING post gait /64 mmHg, pulse 105 bpm, SaO2 100% with room air.     Pain: 0 VAS   Location: denies pain  Intervention for pain: N/A    Education: Provided education on the importance of mobility in the acute care setting, Verbal/Tactile Cues, Transfer Training, Gait Training, and Energy conservation strategies    Assessment: Catalina Moreno presents with functional mobility impairments which indicate the need for skilled intervention. Tolerating session today without incident. Vitals improved this date with pt able to ambulate on room air and no s/s hypotension this date. Will continue to follow and progress as tolerated.     Plan/Recommendations:   If medically appropriate, Moderate Intensity Therapy recommended post-acute care. This is recommended as therapy feels the patient would require 3-4 days per week and wouldn't tolerate \"3 hour daily\" rehab intensity. SNF would be the preferred choice. If the patient does not agree to SNF, arrange HH or OP depending on home bound status. If patient is medically complex, consider LTACH. Pt requires rolling walker at discharge.     Pt desires Home with Home Health at discharge. Pt cooperative; agreeable to therapeutic recommendations and plan of care.     Post-Tx Position: Up in Chair, Alarms activated, and Call light and personal items within reach  PPE: gloves, surgical mask, and gown   "

## 2024-07-26 NOTE — PLAN OF CARE
"Assessment: Catalina Moreno presents with functional mobility impairments which indicate the need for skilled intervention. Tolerating session today without incident. Vitals improved this date with pt able to ambulate on room air and no s/s hypotension this date. Will continue to follow and progress as tolerated.     Plan/Recommendations:   If medically appropriate, Moderate Intensity Therapy recommended post-acute care. This is recommended as therapy feels the patient would require 3-4 days per week and wouldn't tolerate \"3 hour daily\" rehab intensity. SNF would be the preferred choice. If the patient does not agree to SNF, arrange HH or OP depending on home bound status. If patient is medically complex, consider LTACH. Pt requires rolling walker at discharge.     Pt desires Home with Home Health at discharge. Pt cooperative; agreeable to therapeutic recommendations and plan of care.                         "

## 2024-07-26 NOTE — CASE MANAGEMENT/SOCIAL WORK
Case Management Discharge Note      [facility]       [facility] Care Coordination complete.      Service Provider Selected Services Address Phone Fax Patient Preferred    [facility] - [place] [facility] ,  Home Rehabilitation [address] [place] [address], [place] [facility] [phone] --               Transportation Services  Private: Car    Final Discharge Disposition Code: 06 - home with home health care      Continued Stay Note   Gabriel     Patient Name: Catalina Moreno  MRN: [id number]  Today's Date: 7/26/2024    Admit Date: 7/24/2024    Plan: Return home alone. Current [facility] (orders noted)   Discharge Plan       Row Name 07/26/24 3287       Plan    Plan Return home alone. Current [facility] (orders noted)    Plan Comments Noted PT recc for SNF, Met with patient in am who initially declined SNF, due to her dog.  MD came in room while this CM at bedside, and patient agreed to SNF for a short stay.   Chose [facility] as she has been there in the past.  Referral sent. [facility] accepted, bed available today.  [facility] approved.   Facility to transport at 4PM. Later notified by RN that patient has changed her mind.  Friend at bedside is agreeable to care for her dog while she is at snf.  But patient continues to refuse snf.   Discussed with patient and friend that due to her having Medicare, 3 inpatient midnights and  set up, if she gets home and realized that she shoudl have gone to rehab she would be eligible for a home to facility admit without returning to hospital.  [facility] main phone number given to friend.   Also discussed this with ronen and Ramon .    orders noted.  Friend at bedside to transport.                       Expected Discharge Date and Time       Expected Discharge Date Expected Discharge Time    Jul 26, 2024               Yanique Seo RN     Office Phone [phone]  Office Cell [phone]

## 2024-07-26 NOTE — PROGRESS NOTES
CARDIOLOGY PROGRESS NOTE:    Catalina Moreno  84 y.o.  female  1940  [id number]      Referring Provider: Monalisa Lopes MD     Reason for follow-up: Atrial fibrilation with RVR     Patient Care Team:  Liv Duron APRN as PCP - General (Nurse Practitioner)  Flash Gonzalez MD as Consulting Physician (Cardiology)  Shefali Worthy MD as Consulting Physician (Pain Medicine)    Subjective .  Patient seen and examined.  Labs and chart reviewed.  Patient is doing well from cardiology standpoint as her heart rate is better controlled off of Cardizem drip  Denies shortness of breath, palpitations    Objective out of bed to chair resting comfortably on room air     Review of Systems   Constitutional: Negative for chills, fever and malaise/fatigue.   Cardiovascular:  Negative for chest pain, leg swelling, palpitations and syncope.   Respiratory:  Negative for cough and shortness of breath.    Gastrointestinal:  Negative for abdominal pain, nausea and vomiting.   Neurological:  Negative for dizziness, headaches and weakness.   Psychiatric/Behavioral:  Negative for altered mental status.        Allergies: Baclofen, Codeine, Ibuprofen, Methocarbamol, Naproxen, Tizanidine hcl, and Tolmetin    Scheduled Meds:amiodarone, 200 mg, Oral, Q12H  apixaban, 5 mg, Oral, Q12H  ferrous sulfate, 324 mg, Oral, Daily With Breakfast  metoprolol succinate XL, 50 mg, Oral, Daily  sodium chloride, 10 mL, Intravenous, Q12H  spironolactone, 25 mg, Oral, Daily      Continuous Infusions:dilTIAZem, 5-15 mg/hr, Last Rate: Stopped (07/25/24 1205)      PRN Meds:.  acetaminophen **OR** acetaminophen **OR** acetaminophen    aluminum-magnesium hydroxide-simethicone    senna-docusate sodium **AND** polyethylene glycol **AND** bisacodyl **AND** bisacodyl    Calcium Replacement - Follow Nurse / BPA Driven Protocol    cyclobenzaprine    Magnesium Cardiology Dose Replacement - Follow Nurse / BPA Driven Protocol    nitroglycerin    ondansetron ODT **OR**  "ondansetron    oxyCODONE    Phosphorus Replacement - Follow Nurse / BPA Driven Protocol    Potassium Replacement - Follow Nurse / BPA Driven Protocol    [COMPLETED] Insert Peripheral IV **AND** sodium chloride    sodium chloride    sodium chloride        VITAL SIGNS  Vitals:    07/25/24 2041 07/26/24 0045 07/26/24 0500 07/26/24 0900   BP: 120/75 111/68 122/67 128/60   BP Location:  Right arm Right arm Right arm   Patient Position:  Lying Lying Sitting   Pulse: 98 92 97 106   Resp:  16 16 14   Temp:  97.6 °F (36.4 °C) 97.8 °F (36.6 °C) 97.5 °F (36.4 °C)   TempSrc:  Oral Axillary Oral   SpO2:  98% 95% 98%   Weight:   73.6 kg (162 lb 4.1 oz)    Height:           Flowsheet Rows      Flowsheet Row First Filed Value   Admission Height 160 cm (63\") Documented at 07/24/2024 1413   Admission Weight 77 kg (169 lb 12.1 oz) Documented at 07/24/2024 1413             TELEMETRY: Atrial fibrillation with controlled ventricular response    Physical Exam:  Vitals reviewed.   Constitutional:       Appearance: Well-developed and not in distress.   Eyes:      Pupils: Pupils are equal, round, and reactive to light.   Pulmonary:      Effort: Pulmonary effort is normal.      Breath sounds: Normal breath sounds.   Cardiovascular:      Normal rate. Irregularly irregular rhythm.   Pulses:     Intact distal pulses.   Edema:     Peripheral edema absent.   Abdominal:      Palpations: Abdomen is soft.   Musculoskeletal: Normal range of motion.      Cervical back: Normal range of motion and neck supple. Skin:     General: Skin is warm and dry.   Neurological:      General: No focal deficit present.      Mental Status: Alert and oriented to person, place and time.          Results Review:   I reviewed the patient's new clinical results.  Lab Results (last 24 hours)       Procedure Component Value Units Date/Time    CBC & Differential [[id number]]  (Abnormal) Collected: 07/26/24 0509    Specimen: Blood from Arm, Left Updated: 07/26/24 0628    " Narrative:      The following orders were created for panel order CBC & Differential.  Procedure                               Abnormality         Status                     ---------                               -----------         ------                     CBC Auto Differential[[id number]]        Abnormal            Final result               Scan Slide[[id number]]                                       Final result                 Please view results for these tests on the individual orders.    CBC Auto Differential [[id number]]  (Abnormal) Collected: 07/26/24 0509    Specimen: Blood from Arm, Left Updated: 07/26/24 0628     WBC 6.09 10*3/mm3      RBC 4.00 10*6/mm3      Hemoglobin 10.6 g/dL      Hematocrit 34.9 %      MCV 87.3 fL      MCH 26.5 pg      MCHC 30.4 g/dL      RDW 25.2 %      RDW-SD 76.7 fl      MPV 9.7 fL      Platelets 274 10*3/mm3     Narrative:      The previously reported component NRBC is no longer being reported. Previous result was 0.0 /100 WBC (Reference Range: 0.0-0.2 /100 WBC) on 7/26/2024 at 0548 EDT.    Scan Slide [[id number]] Collected: 07/26/24 0509    Specimen: Blood from Arm, Left Updated: 07/26/24 0628     Scan Slide --     Comment: See Manual Differential Results       Manual Differential [[id number]]  (Abnormal) Collected: 07/26/24 0509    Specimen: Blood from Arm, Left Updated: 07/26/24 0628     Neutrophil % 56.0 %      Lymphocyte % 25.0 %      Monocyte % 8.0 %      Eosinophil % 8.0 %      Basophil % 3.0 %      Neutrophils Absolute 3.41 10*3/mm3      Lymphocytes Absolute 1.52 10*3/mm3      Monocytes Absolute 0.49 10*3/mm3      Eosinophils Absolute 0.49 10*3/mm3      Basophils Absolute 0.18 10*3/mm3      Anisocytosis Mod/2+     WBC Morphology Normal     Large Platelets Slight/1+    Basic Metabolic Panel [[id number]]  (Normal) Collected: 07/26/24 0509    Specimen: Blood from Arm, Left Updated: 07/26/24 0606     Glucose 95 mg/dL      BUN 21 mg/dL      Creatinine 0.89 mg/dL      Sodium 140  mmol/L      Potassium 4.3 mmol/L      Chloride 103 mmol/L      CO2 28.6 mmol/L      Calcium 8.6 mg/dL      BUN/Creatinine Ratio 23.6     Anion Gap 8.4 mmol/L      eGFR 64.4 mL/min/1.73     Narrative:      GFR Normal >60  Chronic Kidney Disease <60  Kidney Failure <15    The GFR formula is only valid for adults with stable renal function between ages 18 and 70.    Magnesium [[id number]]  (Normal) Collected: 07/26/24 0509    Specimen: Blood from Arm, Left Updated: 07/26/24 0606     Magnesium 2.3 mg/dL     Phosphorus [[id number]]  (Normal) Collected: 07/26/24 0509    Specimen: Blood from Arm, Left Updated: 07/26/24 0606     Phosphorus 3.1 mg/dL             Imaging Results (Last 24 Hours)       ** No results found for the last 24 hours. **            EKG        I personally viewed and interpreted the patient's EKG/Telemetry data:    ECHOCARDIOGRAM:  Results for orders placed during the hospital encounter of 07/05/24    Adult Transthoracic Echo Complete w/ Color, Spectral and Contrast if necessary per protocol    Interpretation Summary    Left ventricular ejection fraction appears to be 46 - 50%.    Left ventricular wall thickness is consistent with moderate concentric hypertrophy.    The left atrial cavity is severely dilated.    The right atrial cavity is moderate to severely  dilated.    Abnormal mitral valve structure consistent with dilated annulus.    Moderate to severe mitral valve regurgitation is present with a centrally-directed jet noted.    Moderate to severe tricuspid valve regurgitation is present.    Estimated right ventricular systolic pressure from tricuspid regurgitation is moderately elevated (45-55 mmHg).    Transthoracic echocardiography reveals moderate LVH with overall EF between 45 to 50%  Moderate to severe central mitral regurgitation and moderate to severe TR with moderate pulm hypertension with mild to moderate AI.  Severe left atrial enlargement and moderate right atrial enlargement and no  effusion.    Electronically signed by Baljeet Valero MD, 07/06/24, 3:26 PM EDT.       STRESS MYOVIEW:  Results for orders placed during the hospital encounter of 03/31/21    Stress Test With Myocardial Perfusion One Day    Interpretation Summary  · Findings consistent with a normal ECG stress test.  · Left ventricular ejection fraction is hyperdynamic (Calculated EF > 70%). .  · Impressions are consistent with a study indicating uncertain risk.  · Large apical defect with some reperfusion during the rest images cannot rule out ischemia EF 76% Clinical correlation is recommended.    Electronically signed by KALYANI Tay, 04/01/21, 11:25 AM EDT.       CARDIAC CATHETERIZATION:  Results for orders placed during the hospital encounter of 03/31/21    Cardiac Catheterization/Vascular Study    Narrative  CARDIAC CATHETERIZATION REPORT    DATE OF PROCEDURE: 4/2/2021      INDICATION FOR PROCEDURE: Abnormal stress test    PROCEDURE PERFORMED: Left heart catheterization, coronary angiography,    PROCEDURE COMMENTS:    All the risks and benefits explained with the patient informed consent was obtained from the patient.  Patient was brought to the cardiac catheterization laboratory placed on the table draped and prepped in the usual sterile fashion.  2% lidocaine was used to anesthetize the right groin area.  Using the modified Seldinger technique 5 Samoan sheath was inserted into the right femoral artery.    5 Samoan JL4 catheter was used to perform the selective left coronary angiography    5 Samoan JR4 catheter was used to perform the selective right coronary angiography    Angio-Seal was placed after exchanging five Samoan sheath with six Samoan sheath    Patient tolerated procedure well without any immediate complications      FINDINGS:    1. HEMODYNAMICS:  LV end-diastolic pressure 7 mmHg, /80 mmHg.    2. LEFT VENTRICULOGRAPHY: Not done patient had echocardiogram    3. CORONARY ANGIOGRAPHY:  Dominance  right  Left Main: Large-caliber vessel bifurcates into LAD circumflex artery 0% stenosis  LAD: Large-caliber vessel gives rise to couple of medium caliber diagonal arteries multiple septal branches reaches the apex mild luminal irregularities noted less than 10% stenosis  CIRC: Large-caliber vessel gives rise to marginal lateral branches less than 10% stenosis  RCA: Large-caliber dominant artery gives rise to PDA and PL branches less than 5 to 10% stenosis    SUMMARY: No obstructive coronary artery disease    RECOMMENDATIONS: Evaluate for noncardiac causes of symptoms aggressive cardiac risk factor modification       OTHER:         Assessment & Plan     Principal Problem:    Atrial fibrillation       Atrial fibrillation with rapid ventricular response  History of A-fib  History of sick sinus syndrome s/p permanent pacemaker  Rates improved with Cardizem drip  Cardizem drip weaned off  Continue amiodarone and metoprolol  Eliquis for anticoagulation  Recent echocardiogram with midrange LVEF moderate to severe MR and TR  Patient to follow-up at next visit and discuss further intervention to include CV or ablation at that time    HFmEF  Echocardiogram with LVEF of 46 to 50%  Continue metoprolol succinate, Aldactone  Appears euvolemic    Coronary artery disease  Cardiac catheterization from 2021 with minimal disease  Denies chest pain  Continue beta-blocker, Eliquis    Valvular disease  Moderate to severe mitral and tricuspid valve regurgitation  Severe atrial enlargement  Moderate pulmonary hypertension  Continue beta-blocker  Consider low-dose ACE inhibitor if pressure allows for afterload     No further cardiac workup needed at this time  Patient is okay to discharge and follow with cardiology at next scheduled appointment on 8/13/2024 with device interrogation    I discussed the patients findings and my recommendations with patient and nurse    KALYANI Greene  07/26/24  11:09 EDT

## 2024-07-29 ENCOUNTER — TRANSITIONAL CARE MANAGEMENT TELEPHONE ENCOUNTER (OUTPATIENT)
Dept: CALL CENTER | Facility: HOSPITAL | Age: 84
End: 2024-07-29
Payer: MEDICARE

## 2024-07-29 ENCOUNTER — TELEPHONE (OUTPATIENT)
Dept: FAMILY MEDICINE CLINIC | Facility: CLINIC | Age: 84
End: 2024-07-29
Payer: MEDICARE

## 2024-07-29 NOTE — TELEPHONE ENCOUNTER
JD WITH MED [facility] CALLED PT. HAS FLUCTUATING H/R BETWEEN , WEAK. PT ALSO FORGOT TO TAKE HER AMIODORONE LAST NIGHT. [phone]

## 2024-07-29 NOTE — OUTREACH NOTE
Call Center TCM Note      Flowsheet Row Responses   [facility] patient discharged from? [facility]   Does the patient have one of the following disease processes/diagnoses(primary or secondary)? Other   TCM attempt successful? Yes   Call start time 0824   Call end time 0828   Discharge diagnosis Atrial fibrillation   Meds reviewed with patient/caregiver? Yes   Is the patient having any side effects they believe may be caused by any medication additions or changes? No   Does the patient have all medications ordered at discharge? Yes   Is the patient taking all medications as directed (includes completed medication regime)? Yes   Medication comments Pt reports she has a friend that helps organize her meds   Comments Pt has an already scheduled appt on 8/1/24 @11am   Does the patient have an appointment with their PCP within 7-14 days of discharge? Yes   What is the Home health agency?  [facility]   Has home health visited the patient within 72 hours of discharge? Yes   DME comments pt uses a walker for balance   Psychosocial issues? No   Did the patient receive a copy of their discharge instructions? Yes   Nursing interventions Reviewed instructions with patient   What is the patient's perception of their health status since discharge? Improving  [weak]   Is the patient/caregiver able to teach back the hierarchy of who to call/visit for symptoms/problems? PCP, Specialist, Home health nurse, Urgent Care, ED, 911 Yes   TCM call completed? Yes   Call end time 0828   Would this patient benefit from a Referral to Amb Social Work? No   Is the patient interested in additional calls from an ambulatory ? No            Tamara Ann RN    7/29/2024, 08:30 EDT

## 2024-07-30 ENCOUNTER — TELEPHONE (OUTPATIENT)
Dept: CARDIOLOGY | Facility: CLINIC | Age: 84
End: 2024-07-30
Payer: MEDICARE

## 2024-07-30 NOTE — TELEPHONE ENCOUNTER
eRnea with  OT called stating resting -130  /88 with complaint of feeling tired. Per Stac[facility] last message, information was called into Dr. Gonzalez office. They will discuss with Dr. Gonzalez and will contact .    Renea: [phone]

## 2024-07-30 NOTE — TELEPHONE ENCOUNTER
Called Renea from [facility] and verbally explained that per Dr Gonzalez, to monitor BP and HR for 2 weeks and then come in for a follow up in office 8/13/24 for further discussion on ablation verses the cardioversion.      She verbally understood and had no further questions or concerns at this time.

## 2024-07-30 NOTE — TELEPHONE ENCOUNTER
Toyna with [facility]  Phone [phone]    Renea with [facility] called them about patient. Her resting heart rate is 120-130. /88. Patient feeling tired.   Liv Duron asking if we can reach out to home health since Dr. Gonzalez is managing her atrial fibrillation.     Renea [phone]

## 2024-07-30 NOTE — TELEPHONE ENCOUNTER
Have him check her blood pressure and heart rate daily twice and have her follow in my office and then will discuss with her about options like ablation versus cardioversion.

## 2024-07-30 NOTE — TELEPHONE ENCOUNTER
Called Renea with Home health and she stated the patients PCP Is wanting Dr Gonzalez to determine the plan of care for the patient with her afib and that her HR is running within 120-130's throughout the day.

## 2024-07-31 ENCOUNTER — TELEPHONE (OUTPATIENT)
Dept: CARDIOLOGY | Facility: CLINIC | Age: 84
End: 2024-07-31
Payer: MEDICARE

## 2024-07-31 NOTE — TELEPHONE ENCOUNTER
Called Lotus, stated we spoke to Renea with home health about this yesterday and patient is supposed to monitor heart rate for 2 weeks and has a follow up appointment 8/13.

## 2024-08-01 ENCOUNTER — LAB (OUTPATIENT)
Dept: FAMILY MEDICINE CLINIC | Facility: CLINIC | Age: 84
End: 2024-08-01
Payer: MEDICARE

## 2024-08-01 ENCOUNTER — OFFICE VISIT (OUTPATIENT)
Dept: FAMILY MEDICINE CLINIC | Facility: CLINIC | Age: 84
End: 2024-08-01
Payer: MEDICARE

## 2024-08-01 VITALS
SYSTOLIC BLOOD PRESSURE: 121 MMHG | OXYGEN SATURATION: 97 % | HEART RATE: 128 BPM | BODY MASS INDEX: 27.54 KG/M2 | HEIGHT: 63 IN | WEIGHT: 155.4 LBS | DIASTOLIC BLOOD PRESSURE: 82 MMHG | TEMPERATURE: 97.7 F | RESPIRATION RATE: 18 BRPM

## 2024-08-01 DIAGNOSIS — I48.91 ATRIAL FIBRILLATION WITH RVR: ICD-10-CM

## 2024-08-01 DIAGNOSIS — F01.B0 MODERATE VASCULAR DEMENTIA WITHOUT BEHAVIORAL DISTURBANCE, PSYCHOTIC DISTURBANCE, MOOD DISTURBANCE, OR ANXIETY: ICD-10-CM

## 2024-08-01 DIAGNOSIS — R26.89 BALANCE PROBLEM: ICD-10-CM

## 2024-08-01 DIAGNOSIS — R53.1 GENERALIZED WEAKNESS: ICD-10-CM

## 2024-08-01 DIAGNOSIS — I50.32 CHRONIC HEART FAILURE WITH PRESERVED EJECTION FRACTION (HFPEF): Primary | ICD-10-CM

## 2024-08-01 DIAGNOSIS — Z91.81 AT RISK FOR FALLS: ICD-10-CM

## 2024-08-01 DIAGNOSIS — E03.9 ACQUIRED HYPOTHYROIDISM: Chronic | ICD-10-CM

## 2024-08-01 DIAGNOSIS — D50.8 OTHER IRON DEFICIENCY ANEMIA: ICD-10-CM

## 2024-08-01 DIAGNOSIS — G57.93 NEUROPATHY OF BOTH FEET: ICD-10-CM

## 2024-08-01 DIAGNOSIS — Z95.0 PRESENCE OF CARDIAC PACEMAKER: ICD-10-CM

## 2024-08-01 DIAGNOSIS — I10 ESSENTIAL (PRIMARY) HYPERTENSION: ICD-10-CM

## 2024-08-01 PROCEDURE — 80048 BASIC METABOLIC PNL TOTAL CA: CPT | Performed by: NURSE PRACTITIONER

## 2024-08-01 PROCEDURE — 85027 COMPLETE CBC AUTOMATED: CPT | Performed by: NURSE PRACTITIONER

## 2024-08-01 PROCEDURE — 84443 ASSAY THYROID STIM HORMONE: CPT | Performed by: NURSE PRACTITIONER

## 2024-08-01 PROCEDURE — 83880 ASSAY OF NATRIURETIC PEPTIDE: CPT | Performed by: NURSE PRACTITIONER

## 2024-08-01 PROCEDURE — 36415 COLL VENOUS BLD VENIPUNCTURE: CPT | Performed by: NURSE PRACTITIONER

## 2024-08-01 NOTE — PROGRESS NOTES
"[facility] Complaint  Chief Complaint   Patient presents with    Follow-up     2 wk follow up and hospital follow up    Fatigue     Feeling very weak, no energy, decreased appetite, nauseous, trouble with memory           Subjective          Catalina Moreno presents to [facility] for   History of Present Illness    Patient with CHF, A-fib, SSS, PPM in place, chronic low back pain, iron deficiency anemia, hypothyroidism was seen as a new patient on 7/17/2024.  Metoprolol was increased to 50 mg due to rapid atrial fibrillation, spironolactone was added for swelling/CHF.  Ordered PT/OT/skilled nursing and is being treated by [facility].     She presented back to [facility] ED again on 7/24/2024 due to heart rate of 134, dizziness and hypertension.  She was seen by Dr. Gonzalez, amiodarone was increased to 200 mg twice daily, continued on Eliquis and metoprolol.      Today her heart rate is 128-136, she is dizzy and weak. Her neighbor Shwetha assists her, pt has no other family/friends to assist her. Pt apparently took more than 1 Gabapentin at night and became confused with worsening of dizziness and memory, Shwetha removed it and pt has not taken it since.      She is extremely weak and complains of memory decline, see ACE mini.  Assisted living facility was recommended to patient at the last visit and she was recommended to contact [facility] for home health aide and other assistance.   CT Head Without Contrast (07/24/2024 16:22)     Home sleep study was ordered to evaluate need for oxygen through [facility] [employer], she received the \"bracelet\" but Shwetha states pt lost it in her house and can not find it. She is forgetting to eat, misplacing things, her mother had alzheimers.       ATTENTION  What is the year: correct  What is the month of the year: incorrect  What is the day of the week?: correct  What is the date?: incorrect  MEMORY  Repeat address three times, only score third attempt: Johnathan Leon " [address] [place], [place]: 4  HOW MANY ANIMALS DID THE PATIENT NAME  Verbal Fluency -- Animal Names (0-25): 11-13  CLOCK DRAWING  Clock Drawing: All Correct  MEMORY RECALL  Tell me what you remember about that name and address we were repeating at the beginnin  ACE TOTAL SCORE  Total ACE Score - <25/30 strongly suggests cognitive impairment; <21/30 almost certainly shows dementia: 15      The following portions of the patient's history were reviewed and updated as appropriate: allergies, current medications, past family history, past medical history, past social history, past surgical history and problem list.    Past Medical History:   Diagnosis Date    Acquired spondylolisthesis of lumbosacral region     Acute kidney injury 2022    Anxiety     Arthritis     Atrial fibrillation     Broken shoulder     Chronic pain disorder     Chronic systolic CHF (congestive heart failure) 2023    Coronary artery disease involving native coronary artery of native heart without angina pectoris 2021    DDD (degenerative disc disease), cervical     DDD (degenerative disc disease), lumbar     Depression     Edema     GERD (gastroesophageal reflux disease)     H/O fall     Heart failure with mid-range ejection fraction 2022    Hypertension     Hypothyroidism     Insomnia     Low back pain     Moderate mitral regurgitation 2023    Neuropathy     Nonrheumatic tricuspid valve regurgitation     Pain in both feet     Polypharmacy     PONV (postoperative nausea and vomiting)     Pulmonary hypertension     Radiculopathy     Restless legs     Sacroiliitis     Sick sinus syndrome     Spondylolisthesis     Stage 3a chronic kidney disease     Urinary incontinence     Vitamin D deficiency      Past Surgical History:   Procedure Laterality Date    BACK SURGERY  2018    PDC &  PSF L3-L5 insitu    CARDIAC CATHETERIZATION N/A 2021    Procedure: Cardiac Catheterization/Vascular Study;  Surgeon:  Barrett Evans MD;  Location: [facility] CATH INVASIVE LOCATION;  Service: Cardiovascular;  Laterality: N/A;    CARDIAC ELECTROPHYSIOLOGY PROCEDURE N/A 1/17/2023    Procedure: Pacemaker DC new;  Surgeon: Baljeet Valero MD;  Location: [facility] CATH INVASIVE LOCATION;  Service: Cardiovascular;  Laterality: N/A;    CHOLECYSTECTOMY      COLONOSCOPY      COLONOSCOPY N/A 7/10/2024    Procedure: COLONOSCOPY with cold snare polypectomy x1 and endoscopic clipping x2;  Surgeon: MARLIN Vanegas MD;  Location: [facility] ENDOSCOPY;  Service: Gastroenterology;  Laterality: N/A;  Post- colon polyps, diverticulosis    ENDOSCOPY N/A 9/14/2023    Procedure: ESOPHAGOGASTRODUODENOSCOPY;  Surgeon: Ulysses Lamas MD;  Location: [facility] ENDOSCOPY;  Service: Gastroenterology;  Laterality: N/A;  gastritis, inflammatory gastric polyp    ENDOSCOPY N/A 7/10/2024    Procedure: ESOPHAGOGASTRODUODENOSCOPY;  Surgeon: MARLIN Vanegas MD;  Location: [facility] ENDOSCOPY;  Service: Gastroenterology;  Laterality: N/A;  Post- hiatal hernia, gastric polyps, gastritis    HYSTERECTOMY      ROTATOR CUFF REPAIR Left      Family History   Problem Relation Age of Onset    Cancer Father     Diabetes Son     Cancer Paternal Aunt     Heart disease Paternal Aunt     Cancer Paternal Uncle      Social History     Tobacco Use    Smoking status: Never     Passive exposure: Never    Smokeless tobacco: Never   Substance Use Topics    Alcohol use: No       Current Outpatient Medications:     amiodarone (PACERONE) 200 MG tablet, Take 1 tablet by mouth Every 12 (Twelve) Hours for 30 days., Disp: , Rfl:     apixaban (ELIQUIS) 5 MG tablet tablet, Take 1 tablet by mouth Every 12 (Twelve) Hours. Indications: Atrial Fibrillation, Disp: 60 tablet, Rfl: 2    cyclobenzaprine (FLEXERIL) 5 MG tablet, Take 1 tablet by mouth 2 (Two) Times a Day As Needed for Muscle Spasms., Disp: , Rfl:     ferrous gluconate (FERGON) 324 MG tablet, Take 1 tablet by mouth Every Other  "Day for 60 days., Disp: 30 tablet, Rfl: 0    metoprolol succinate XL (TOPROL-XL) 50 MG 24 hr tablet, Take 1 tablet by mouth Daily. For Afib/Hypertension, Disp: 90 tablet, Rfl: 1    spironolactone (Aldactone) 25 MG tablet, Take 1 tablet by mouth Daily. For swelling/CHF, Disp: 90 tablet, Rfl: 1    dilTIAZem (Cardizem) 30 MG tablet, Take 1 tablet by mouth 2 (Two) Times a Day., Disp: 60 tablet, Rfl: 0    melatonin 3 MG tablet, Take 1 tablet by mouth Every Night., Disp: , Rfl:     Objective   Vital Signs:   /82 (BP Location: Left arm, Patient Position: Sitting, Cuff Size: Adult)   Pulse (!) 128   Temp 97.7 °F (36.5 °C) (Oral)   Resp 18   Ht 160 cm (63\")   Wt 70.5 kg (155 lb 6.4 oz)   SpO2 97% Comment: Room air  BMI 27.53 kg/m²           Physical Exam  Constitutional:       General: She is not in acute distress.     Appearance: Normal appearance. She is well-developed. She is ill-appearing (chronic). She is not diaphoretic.   HENT:      Head: Normocephalic.   Eyes:      Conjunctiva/sclera: Conjunctivae normal.      Pupils: Pupils are equal, round, and reactive to light.   Neck:      Thyroid: No thyromegaly.      Vascular: No JVD.   Cardiovascular:      Rate and Rhythm: Normal rate. Rhythm irregular.      Heart sounds: Normal heart sounds. No murmur heard.     Comments: Rate of 136 on auscultation  Pulmonary:      Effort: Pulmonary effort is normal. No respiratory distress.      Breath sounds: Normal breath sounds. No wheezing or rhonchi.   Abdominal:      General: Bowel sounds are normal. There is no distension.      Palpations: Abdomen is soft.      Tenderness: There is no abdominal tenderness.   Musculoskeletal:         General: Swelling (trace pitting BLE) present. No tenderness. Normal range of motion.      Cervical back: Normal range of motion and neck supple. No tenderness.   Lymphadenopathy:      Cervical: No cervical adenopathy.   Skin:     General: Skin is warm and dry.      Coloration: Skin is pale. " Skin is not jaundiced.      Findings: No erythema or rash.   Neurological:      General: No focal deficit present.      Mental Status: She is alert and oriented to person, place, and time. Mental status is at baseline.      Sensory: No sensory deficit.      Motor: Weakness present.      Gait: Gait abnormal (Uses walker for mobility, but does not have today. angelica provides 1 arm assistance).   Psychiatric:         Attention and Perception: Attention normal.         Mood and Affect: Mood normal. Mood is not anxious or depressed.         Behavior: Behavior normal. Behavior is cooperative.         Thought Content: Thought content normal.         Cognition and Memory: Memory is impaired. She exhibits impaired recent memory.         Judgment: Judgment normal.          Result Review :     Admission on 07/24/2024, Discharged on 07/26/2024   Component Date Value Ref Range Status    QT Interval 07/24/2024 328  ms Final    QTC Interval 07/24/2024 497  ms Final    Extra Tube 07/24/2024 Hold for add-ons.   Final    Auto resulted.    Extra Tube 07/24/2024 hold for add-on   Final    Auto resulted    Glucose 07/24/2024 128 (H)  65 - 99 mg/dL Final    BUN 07/24/2024 23  8 - 23 mg/dL Final    Creatinine 07/24/2024 0.89  0.57 - 1.00 mg/dL Final    Sodium 07/24/2024 137  136 - 145 mmol/L Final    Potassium 07/24/2024 5.0  3.5 - 5.2 mmol/L Final    Slight hemolysis detected by analyzer. Result may be falsely elevated.    Chloride 07/24/2024 100  98 - 107 mmol/L Final    CO2 07/24/2024 26.9  22.0 - 29.0 mmol/L Final    Calcium 07/24/2024 9.6  8.6 - 10.5 mg/dL Final    Total Protein 07/24/2024 7.5  6.0 - 8.5 g/dL Final    Albumin 07/24/2024 4.3  3.5 - 5.2 g/dL Final    ALT (SGPT) 07/24/2024 <5  1 - 33 U/L Final    AST (SGOT) 07/24/2024 27  1 - 32 U/L Final    Slight hemolysis detected by analyzer. Result may be falsely elevated.    Alkaline Phosphatase 07/24/2024 84  39 - 117 U/L Final    Total Bilirubin 07/24/2024 0.7  0.0 - 1.2 mg/dL  Final    Globulin 07/24/2024 3.2  gm/dL Final    A/G Ratio 07/24/2024 1.3  g/dL Final    BUN/Creatinine Ratio 07/24/2024 25.8 (H)  7.0 - 25.0 Final    Anion Gap 07/24/2024 10.1  5.0 - 15.0 mmol/L Final    eGFR 07/24/2024 64.4  >60.0 mL/min/1.73 Final    Color, UA 07/24/2024 Yellow  Yellow, Straw Final    Appearance, UA 07/24/2024 Clear  Clear Final    pH, UA 07/24/2024 8.0  5.0 - 8.0 Final    Specific Gravity, UA 07/24/2024 1.012  1.005 - 1.030 Final    Glucose, UA 07/24/2024 Negative  Negative Final    Ketones, UA 07/24/2024 Negative  Negative Final    Bilirubin, UA 07/24/2024 Negative  Negative Final    Blood, UA 07/24/2024 Negative  Negative Final    Protein, UA 07/24/2024 Negative  Negative Final    Leuk Esterase, UA 07/24/2024 Trace (A)  Negative Final    Nitrite, UA 07/24/2024 Negative  Negative Final    Urobilinogen, UA 07/24/2024 0.2 E.U./dL  0.2 - 1.0 E.U./dL Final    Protime 07/24/2024 10.7  9.6 - 11.7 Seconds Final    INR 07/24/2024 0.98  0.93 - 1.10 Final    PTT 07/24/2024 24.2 (L)  61.0 - 76.5 seconds Final    HS Troponin T 07/24/2024 11  <14 ng/L Final    proBNP 07/24/2024 1,511.0  0.0 - 1,800.0 pg/mL Final    Magnesium 07/24/2024 2.1  1.6 - 2.4 mg/dL Final    Creatine Kinase 07/24/2024 33  20 - 180 U/L Final    WBC 07/24/2024 7.16  3.40 - 10.80 10*3/mm3 Final    RBC 07/24/2024 4.51  3.77 - 5.28 10*6/mm3 Final    Hemoglobin 07/24/2024 12.0  12.0 - 15.9 g/dL Final    Hematocrit 07/24/2024 38.2  34.0 - 46.6 % Final    MCV 07/24/2024 84.7  79.0 - 97.0 fL Final    MCH 07/24/2024 26.6  26.6 - 33.0 pg Final    MCHC 07/24/2024 31.4 (L)  31.5 - 35.7 g/dL Final    RDW 07/24/2024 24.9 (H)  12.3 - 15.4 % Final    RDW-SD 07/24/2024 73.2 (H)  37.0 - 54.0 fl Final    MPV 07/24/2024 9.9  6.0 - 12.0 fL Final    Platelets 07/24/2024 322  140 - 450 10*3/mm3 Final    Neutrophil % 07/24/2024 66.0  42.7 - 76.0 % Final    Lymphocyte % 07/24/2024 20.3  19.6 - 45.3 % Final    Monocyte % 07/24/2024 9.9  5.0 - 12.0 % Final     Eosinophil % 07/24/2024 2.5  0.3 - 6.2 % Final    Basophil % 07/24/2024 1.0  0.0 - 1.5 % Final    Immature Grans % 07/24/2024 0.3  0.0 - 0.5 % Final    Neutrophils, Absolute 07/24/2024 4.73  1.70 - 7.00 10*3/mm3 Final    Lymphocytes, Absolute 07/24/2024 1.45  0.70 - 3.10 10*3/mm3 Final    Monocytes, Absolute 07/24/2024 0.71  0.10 - 0.90 10*3/mm3 Final    Eosinophils, Absolute 07/24/2024 0.18  0.00 - 0.40 10*3/mm3 Final    Basophils, Absolute 07/24/2024 0.07  0.00 - 0.20 10*3/mm3 Final    Immature Grans, Absolute 07/24/2024 0.02  0.00 - 0.05 10*3/mm3 Final    nRBC 07/24/2024 0.0  0.0 - 0.2 /100 WBC Final    Extra Tube 07/24/2024 hold for add-on   Final    Auto resulted    Extra Tube 07/24/2024 Hold for add-ons.   Final    Auto resulted.    RBC, UA 07/24/2024 None Seen  None Seen, 0-2 /HPF Final    WBC, UA 07/24/2024 0-2  None Seen, 0-2 /HPF Final    Urine culture not indicated.    Bacteria, UA 07/24/2024 None Seen  None Seen /HPF Final    Squamous Epithelial Cells, UA 07/24/2024 None Seen  None Seen, 0-2 /HPF Final    Hyaline Casts, UA 07/24/2024 None Seen  None Seen /LPF Final    Mucus, UA 07/24/2024 Trace  None Seen, Trace /HPF Final    Methodology 07/24/2024 Manual Light Microscopy   Final    Glucose 07/25/2024 124 (H)  65 - 99 mg/dL Final    BUN 07/25/2024 22  8 - 23 mg/dL Final    Creatinine 07/25/2024 0.89  0.57 - 1.00 mg/dL Final    Sodium 07/25/2024 138  136 - 145 mmol/L Final    Potassium 07/25/2024 4.3  3.5 - 5.2 mmol/L Final    Specimen hemolyzed.  Result may be falsely elevated.    Chloride 07/25/2024 101  98 - 107 mmol/L Final    CO2 07/25/2024 25.6  22.0 - 29.0 mmol/L Final    Calcium 07/25/2024 9.3  8.6 - 10.5 mg/dL Final    BUN/Creatinine Ratio 07/25/2024 24.7  7.0 - 25.0 Final    Anion Gap 07/25/2024 11.4  5.0 - 15.0 mmol/L Final    eGFR 07/25/2024 64.4  >60.0 mL/min/1.73 Final    Magnesium 07/25/2024 2.0  1.6 - 2.4 mg/dL Final    Phosphorus 07/25/2024 4.1  2.5 - 4.5 mg/dL Final    WBC 07/25/2024  6.72  3.40 - 10.80 10*3/mm3 Final    RBC 07/25/2024 4.31  3.77 - 5.28 10*6/mm3 Final    Hemoglobin 07/25/2024 11.2 (L)  12.0 - 15.9 g/dL Final    Hematocrit 07/25/2024 36.5  34.0 - 46.6 % Final    MCV 07/25/2024 84.7  79.0 - 97.0 fL Final    MCH 07/25/2024 26.0 (L)  26.6 - 33.0 pg Final    MCHC 07/25/2024 30.7 (L)  31.5 - 35.7 g/dL Final    RDW 07/25/2024 25.2 (H)  12.3 - 15.4 % Final    RDW-SD 07/25/2024 74.3 (H)  37.0 - 54.0 fl Final    MPV 07/25/2024 9.4  6.0 - 12.0 fL Final    Platelets 07/25/2024 304  140 - 450 10*3/mm3 Final    Neutrophil % 07/25/2024 59.5  42.7 - 76.0 % Final    Lymphocyte % 07/25/2024 26.5  19.6 - 45.3 % Final    Monocyte % 07/25/2024 10.0  5.0 - 12.0 % Final    Eosinophil % 07/25/2024 3.0  0.3 - 6.2 % Final    Basophil % 07/25/2024 0.7  0.0 - 1.5 % Final    Immature Grans % 07/25/2024 0.3  0.0 - 0.5 % Final    Neutrophils, Absolute 07/25/2024 4.00  1.70 - 7.00 10*3/mm3 Final    Lymphocytes, Absolute 07/25/2024 1.78  0.70 - 3.10 10*3/mm3 Final    Monocytes, Absolute 07/25/2024 0.67  0.10 - 0.90 10*3/mm3 Final    Eosinophils, Absolute 07/25/2024 0.20  0.00 - 0.40 10*3/mm3 Final    Basophils, Absolute 07/25/2024 0.05  0.00 - 0.20 10*3/mm3 Final    Immature Grans, Absolute 07/25/2024 0.02  0.00 - 0.05 10*3/mm3 Final    nRBC 07/25/2024 0.0  0.0 - 0.2 /100 WBC Final    Glucose 07/26/2024 95  65 - 99 mg/dL Final    BUN 07/26/2024 21  8 - 23 mg/dL Final    Creatinine 07/26/2024 0.89  0.57 - 1.00 mg/dL Final    Sodium 07/26/2024 140  136 - 145 mmol/L Final    Potassium 07/26/2024 4.3  3.5 - 5.2 mmol/L Final    Chloride 07/26/2024 103  98 - 107 mmol/L Final    CO2 07/26/2024 28.6  22.0 - 29.0 mmol/L Final    Calcium 07/26/2024 8.6  8.6 - 10.5 mg/dL Final    BUN/Creatinine Ratio 07/26/2024 23.6  7.0 - 25.0 Final    Anion Gap 07/26/2024 8.4  5.0 - 15.0 mmol/L Final    eGFR 07/26/2024 64.4  >60.0 mL/min/1.73 Final    Magnesium 07/26/2024 2.3  1.6 - 2.4 mg/dL Final    Phosphorus 07/26/2024 3.1  2.5 - 4.5  mg/dL Final    WBC 07/26/2024 6.09  3.40 - 10.80 10*3/mm3 Final    RBC 07/26/2024 4.00  3.77 - 5.28 10*6/mm3 Final    Hemoglobin 07/26/2024 10.6 (L)  12.0 - 15.9 g/dL Final    Hematocrit 07/26/2024 34.9  34.0 - 46.6 % Final    MCV 07/26/2024 87.3  79.0 - 97.0 fL Final    MCH 07/26/2024 26.5 (L)  26.6 - 33.0 pg Final    MCHC 07/26/2024 30.4 (L)  31.5 - 35.7 g/dL Final    RDW 07/26/2024 25.2 (H)  12.3 - 15.4 % Final    RDW-SD 07/26/2024 76.7 (H)  37.0 - 54.0 fl Final    MPV 07/26/2024 9.7  6.0 - 12.0 fL Final    Platelets 07/26/2024 274  140 - 450 10*3/mm3 Final    Scan Slide 07/26/2024    Final    See Manual Differential Results    Neutrophil % 07/26/2024 56.0  42.7 - 76.0 % Final    Lymphocyte % 07/26/2024 25.0  19.6 - 45.3 % Final    Monocyte % 07/26/2024 8.0  5.0 - 12.0 % Final    Eosinophil % 07/26/2024 8.0 (H)  0.3 - 6.2 % Final    Basophil % 07/26/2024 3.0 (H)  0.0 - 1.5 % Final    Neutrophils Absolute 07/26/2024 3.41  1.70 - 7.00 10*3/mm3 Final    Lymphocytes Absolute 07/26/2024 1.52  0.70 - 3.10 10*3/mm3 Final    Monocytes Absolute 07/26/2024 0.49  0.10 - 0.90 10*3/mm3 Final    Eosinophils Absolute 07/26/2024 0.49 (H)  0.00 - 0.40 10*3/mm3 Final    Basophils Absolute 07/26/2024 0.18  0.00 - 0.20 10*3/mm3 Final    Anisocytosis 07/26/2024 Mod/2+  None Seen Final    WBC Morphology 07/26/2024 Normal  Normal Final    Large Platelets 07/26/2024 Slight/1+  None Seen Final                  BMI is >= 25 and <30. (Overweight) The following options were offered after discussion;: exercise counseling/recommendations and nutrition counseling/recommendations           Assessment and Plan    Diagnoses and all orders for this visit:    1. Chronic heart failure with preserved ejection fraction (HFpEF) (Primary)  Comments:  needs to complete, overnight oximetry  cont metop, spironolactone  BNP today  3898--->1511 during hospital stay  Orders:  -     Basic Metabolic Panel  -     BNP  -     TSH  -     CBC (No Diff)    2. At risk  for falls  Comments:  Continue PT/OT with [facility]    3. Balance problem    4. Neuropathy of both feet  Comments:  DC gabapentin  try magnesium otc 240 mg at night for leg cramping and neuropathy    5. 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 Acquired hypothyroidism  Comments:  TSH has been abnormal, will recheck again today, start levothyroxine if still underactive  Orders:  -     TSH    7. Presence of cardiac pacemaker    8. Atrial fibrillation with RVR  Comments:  Continue amiodarone and metoprolol  bp lower end normal today  will add cardizem BID  see Dr. Gonzalez on 8/13  Orders:  -     dilTIAZem (Cardizem) 30 MG tablet; Take 1 tablet by mouth 2 (Two) Times a Day.  Dispense: 60 tablet; Refill: 0  -     Basic Metabolic Panel  -     BNP    9. Other iron deficiency anemia  Comments:  Recheck CBC  Continue iron    10. Essential (primary) hypertension  Comments:  cont metop 50mg BID for now, may need titrated  Orders:  -     BNP    11. Generalized weakness  Comments:  Multifactorial: age, CHF, afib, anemia, forgetting to eat  Needs to complete home overnight oximetry      Encouraged use of walker-she has rollator at home  Encouraged patient to complete advanced directives/POA   Provided [employer] number to call and get another overnight oximetry bracelet if unable to find within her home       I spent 45 minutes caring for Catalina Moreno on this date of service. This time includes time spent by me in the following activities: preparing for the visit, reviewing tests, performing a medically appropriate examination and/or evaluation , counseling and educating the patient/family/caregiver, ordering medications, tests, or procedures and documenting information in the medical  record        Follow Up     Return in about 4 weeks (around 8/29/2024) for Recheck weds/friday so caregiver can bring.  Patient was given instructions and counseling regarding her condition or for health maintenance advice. Please see specific information pulled into the AVS if appropriate.        Part of this note may be an electronic transcription/translation of spoken language to printed text using the Dragon Dictation System she is hospital follow-up at 2-week follow-up in memory today

## 2024-08-02 ENCOUNTER — TELEPHONE (OUTPATIENT)
Dept: FAMILY MEDICINE CLINIC | Facility: CLINIC | Age: 84
End: 2024-08-02
Payer: MEDICARE

## 2024-08-02 LAB
ANION GAP SERPL CALCULATED.3IONS-SCNC: 12.5 MMOL/L (ref 5–15)
BUN SERPL-MCNC: 28 MG/DL (ref 8–23)
BUN/CREAT SERPL: 24.1 (ref 7–25)
CALCIUM SPEC-SCNC: 9.9 MG/DL (ref 8.6–10.5)
CHLORIDE SERPL-SCNC: 101 MMOL/L (ref 98–107)
CO2 SERPL-SCNC: 22.5 MMOL/L (ref 22–29)
CREAT SERPL-MCNC: 1.16 MG/DL (ref 0.57–1)
DEPRECATED RDW RBC AUTO: 66.6 FL (ref 37–54)
EGFRCR SERPLBLD CKD-EPI 2021: 46.9 ML/MIN/1.73
ERYTHROCYTE [DISTWIDTH] IN BLOOD BY AUTOMATED COUNT: 22.9 % (ref 12.3–15.4)
GLUCOSE SERPL-MCNC: 103 MG/DL (ref 65–99)
HCT VFR BLD AUTO: 43.3 % (ref 34–46.6)
HGB BLD-MCNC: 13.7 G/DL (ref 12–15.9)
MCH RBC QN AUTO: 26.8 PG (ref 26.6–33)
MCHC RBC AUTO-ENTMCNC: 31.6 G/DL (ref 31.5–35.7)
MCV RBC AUTO: 84.6 FL (ref 79–97)
NT-PROBNP SERPL-MCNC: 874 PG/ML (ref 0–1800)
PLATELET # BLD AUTO: 318 10*3/MM3 (ref 140–450)
PMV BLD AUTO: 10.3 FL (ref 6–12)
POTASSIUM SERPL-SCNC: 5.1 MMOL/L (ref 3.5–5.2)
RBC # BLD AUTO: 5.12 10*6/MM3 (ref 3.77–5.28)
SODIUM SERPL-SCNC: 136 MMOL/L (ref 136–145)
TSH SERPL DL<=0.05 MIU/L-ACNC: 11.4 UIU/ML (ref 0.27–4.2)
WBC NRBC COR # BLD AUTO: 7.16 10*3/MM3 (ref 3.4–10.8)

## 2024-08-02 NOTE — TELEPHONE ENCOUNTER
Spoke with [facility] and informed of Stacys instructions.     Attempted to speak with Catalina, no answer. Spoke with friend Jazz and relayed message.

## 2024-08-02 NOTE — TELEPHONE ENCOUNTER
Spoke with Lyla Garcia whom called to report elevated heart rate of 145 bpm, 128/80. Weight stable 155.     Please advise of any changes. Current perimeters is to call anytime heart rate is above 100. Do you want to change perimeters?    Radha Nurse Supervisor [facility]: [phone]

## 2024-08-05 RX ORDER — LEVOTHYROXINE SODIUM 0.03 MG/1
25 TABLET ORAL DAILY
Qty: 30 TABLET | Refills: 0 | Status: SHIPPED | OUTPATIENT
Start: 2024-08-05

## 2024-08-06 ENCOUNTER — READMISSION MANAGEMENT (OUTPATIENT)
Dept: CALL CENTER | Facility: HOSPITAL | Age: 84
End: 2024-08-06
Payer: MEDICARE

## 2024-08-06 NOTE — OUTREACH NOTE
Medical Week 2 Survey      Flowsheet Row Responses   [facility] patient discharged from? [facility]   Does the patient have one of the following disease processes/diagnoses(primary or secondary)? Other   Week 2 attempt successful? Yes   Call start time 1134   Discharge diagnosis Atrial fibrillation   Call end time 1136   Person spoke with today (if not patient) and relationship pt   Meds reviewed with patient/caregiver? Yes   Is the patient having any side effects they believe may be caused by any medication additions or changes? No   Does the patient have all medications ordered at discharge? Yes   Is the patient taking all medications as directed (includes completed medication regime)? Yes   Does the patient have a primary care provider?  Yes   Does the patient have an appointment with their PCP within 7 days of discharge? No   Nursing Interventions Educated patient on importance of making appointment, Advised patient to make appointment   Has the patient kept scheduled appointments due by today? N/A   What is the Home health agency?  [facility]   Has home health visited the patient within 72 hours of discharge? Yes   Psychosocial issues? No   What is the patient's perception of their health status since discharge? Improving   Is the patient/caregiver able to teach back signs and symptoms related to disease process for when to call PCP? Yes   Is the patient/caregiver able to teach back signs and symptoms related to disease process for when to call 911? Yes   Is the patient/caregiver able to teach back the hierarchy of who to call/visit for symptoms/problems? PCP, Specialist, Home health nurse, Urgent Care, ED, 911 Yes   Week 2 Call Completed? Yes   Is the patient interested in additional calls from an ambulatory ? No   Would this patient benefit from a Referral to Amb Social Work? No   Wrap up additional comments Pt states she is nauseous today. Pt denies any  irregular heartbeats. Pt reports not being able to eat today because of nausea. Pt advised to try chicken broth, and drink some gingerale. Pt advised to call PCP office to report nausea if she continues to not eat. Pt verbalized understanding.   Call end time 5549            Alexa SCHWAB - Registered Nurse

## 2024-08-07 RX ORDER — FERROUS GLUCONATE 324(38)MG
324 TABLET ORAL EVERY OTHER DAY
Qty: 30 TABLET | Refills: 2 | Status: SHIPPED | OUTPATIENT
Start: 2024-08-07

## 2024-08-13 ENCOUNTER — TELEPHONE (OUTPATIENT)
Dept: CARDIOLOGY | Facility: CLINIC | Age: 84
End: 2024-08-13

## 2024-08-13 NOTE — TELEPHONE ENCOUNTER
DELETE AFTER REVIEWING: Send this encounter to the appropriate pool. See your Call Action Grid or Workflows for direction.        Hub staff attempted to follow warm transfer process and was unsuccessful     Caller: JAMAAL QUIÑONES    Relationship to patient:SISTER OF PATIENT    Best call back number: [phone]    Patient is needing: PATIENT NEIGHBOR STATES THEY CANNOT WAKE THE PATIENT UP THIS MORNING FOR HER APPOINTMENT. HUB ATTEMPTED TO TRANSFER TO OFFICE TO RESCHEDULE. PLEASE CALL BACK ASAP. REQUESTED THAT OFFICE SPEAK WITH SISTER OF PATIENT.

## 2024-08-13 NOTE — TELEPHONE ENCOUNTER
I spoke with pt's sister Hetal to reschedule device check and follow up from 8/13/24 to 8/21/24 at 3:30 pm.

## 2024-08-14 ENCOUNTER — READMISSION MANAGEMENT (OUTPATIENT)
Dept: CALL CENTER | Facility: HOSPITAL | Age: 84
End: 2024-08-14
Payer: MEDICARE

## 2024-08-14 NOTE — OUTREACH NOTE
Medical Week 3 Survey      Flowsheet Row Responses   [facility] patient discharged from? [facility]   Does the patient have one of the following disease processes/diagnoses(primary or secondary)? Other   Week 3 attempt successful? Yes   Call start time 1135   Call end time 1137   Discharge diagnosis Atrial fibrillation   Meds reviewed with patient/caregiver? Yes   Is the patient taking all medications as directed (includes completed medication regime)? Yes   Does the patient have a primary care provider?  Yes   Does the patient have an appointment with their PCP within 7 days of discharge? Yes   Has the patient kept scheduled appointments due by today? Yes   What is the Home health agency?  [facility]   Has home health visited the patient within 72 hours of discharge? Yes   Psychosocial issues? No   Did the patient receive a copy of their discharge instructions? Yes   Nursing interventions Reviewed instructions with patient   What is the patient's perception of their health status since discharge? Improving   Is the patient/caregiver able to teach back signs and symptoms related to disease process for when to call PCP? Yes   Is the patient/caregiver able to teach back signs and symptoms related to disease process for when to call 911? Yes   Is the patient/caregiver able to teach back the hierarchy of who to call/visit for symptoms/problems? PCP, Specialist, Home health nurse, Urgent Care, ED, 911 Yes   Week 3 Call Completed? Yes   Graduated Yes   Graduated/Revoked comments Pt reports she is doing well. No needs.   Call end time 1137            ROHITH DE SOUZA - Registered Nurse

## 2024-08-22 ENCOUNTER — TELEPHONE (OUTPATIENT)
Dept: CARDIOLOGY | Facility: CLINIC | Age: 84
End: 2024-08-22
Payer: MEDICARE

## 2024-08-22 NOTE — TELEPHONE ENCOUNTER
Name: JoshMarissaCatalina J    Relationship: Self    Best Callback Number: [phone]    Incoming call to the Hub, requesting to  Reschedule their Device Check appointment on      Per Hub workflow, further review is needed before the task can be completed.    Result of Call: Please reach out to the patient to reschedule

## 2024-08-28 DIAGNOSIS — I48.91 ATRIAL FIBRILLATION WITH RVR: ICD-10-CM

## 2024-08-28 RX ORDER — DILTIAZEM HYDROCHLORIDE 30 MG/1
30 TABLET, FILM COATED ORAL 2 TIMES DAILY
Qty: 60 TABLET | Refills: 5 | Status: SHIPPED | OUTPATIENT
Start: 2024-08-28

## 2024-08-29 ENCOUNTER — TELEPHONE (OUTPATIENT)
Dept: FAMILY MEDICINE CLINIC | Facility: CLINIC | Age: 84
End: 2024-08-29
Payer: MEDICARE

## 2024-08-29 NOTE — TELEPHONE ENCOUNTER
Jesenia from  OTcalled wanting to inform that pt has refused OT this week. Jesenia states that she think pt is going through a rough patch and is working with pt family to get over this hump.

## 2024-09-04 ENCOUNTER — TELEPHONE (OUTPATIENT)
Dept: FAMILY MEDICINE CLINIC | Facility: CLINIC | Age: 84
End: 2024-09-04
Payer: MEDICARE

## 2024-09-04 DIAGNOSIS — I48.91 ATRIAL FIBRILLATION WITH RVR: ICD-10-CM

## 2024-09-04 DIAGNOSIS — I10 ESSENTIAL (PRIMARY) HYPERTENSION: Chronic | ICD-10-CM

## 2024-09-04 RX ORDER — LEVOTHYROXINE SODIUM 25 UG/1
25 TABLET ORAL DAILY
Qty: 30 TABLET | Refills: 0 | Status: SHIPPED | OUTPATIENT
Start: 2024-09-04

## 2024-09-04 NOTE — TELEPHONE ENCOUNTER
Caller: RUBIO     Best call back number:[phone]    Patient is needing:     CALLING TO REPORT THAT PATIENT HAS BEEN MISSING HER PT APPOINTMENT AND IS NOT ANSWERING ANY OF HER CALLS.     REPORTS THAT EVEN FAMILY MEMBERS HAS BEEN UNABLE TO REACH THE PATIENT.

## 2024-09-04 NOTE — TELEPHONE ENCOUNTER
Radha calling to clarify medications for patient. They are asking about the metoprolol dosing. They said pt is saying she should be taking 50mg BID and they have daily. We have it daily on her med list, but then in your last OV note it states BID. Also, they are asking if she should be taking amiodarone. It states in the OV note to continue the medication, but it is not active on her medication list. They said if she should be on this they will need a new prescription because pt thought she was to stop taking it. Please advise.

## 2024-09-04 NOTE — TELEPHONE ENCOUNTER
Spoke with Shwetha. She had been helping until Catalina's sister- Hetal came in to help. The sister had been taking care of her medications but recently left. This is what is causing the question of medications. Attempted to reach sister and LVM. According to Shwetha, the sister has not been answering her phone when reached out to by Home Health

## 2024-09-04 NOTE — TELEPHONE ENCOUNTER
Spoke with Sister Hetal. Stated that she was giving Catalina:  Diltiazem 30mg bid  Amiodraone 200mg   Metoprolol 50mg once daily

## 2024-09-06 RX ORDER — METOPROLOL SUCCINATE 50 MG/1
50 TABLET, EXTENDED RELEASE ORAL 2 TIMES DAILY
Qty: 180 TABLET | Refills: 1 | Status: SHIPPED | OUTPATIENT
Start: 2024-09-06

## 2024-09-06 RX ORDER — DILTIAZEM HCL 60 MG
30 TABLET ORAL 2 TIMES DAILY
Qty: 180 TABLET | Refills: 1 | Status: SHIPPED | OUTPATIENT
Start: 2024-09-06 | End: 2024-09-06

## 2024-09-06 RX ORDER — DILTIAZEM HCL 60 MG
60 TABLET ORAL 2 TIMES DAILY
Qty: 180 TABLET | Refills: 1 | Status: SHIPPED | OUTPATIENT
Start: 2024-09-06

## 2024-09-06 NOTE — TELEPHONE ENCOUNTER
"Spoke with [employer]. Informed of medication clarification. Medication list updated. Spoke with Shwetha- Neighbor. States patient has \"ran off\" both her and sister and will not allow them back on property. She stated that [employer] was suppose to be sending us a form to complete so Catalina can be transported to hospital instead of alf. Left message for [employer] clinical manager for info/clarification if able to provide.  "

## 2024-09-09 NOTE — TELEPHONE ENCOUNTER
"Spoke with Radha on Friday. They stated that patient had a \"well check\" performed by police on 08/27. While on the property. Discussed w/ sister completing a MIW form to obtain emergent POA. Radha informed that they just started with a  and would send to home for eval.  "

## 2024-09-12 ENCOUNTER — TELEPHONE (OUTPATIENT)
Dept: FAMILY MEDICINE CLINIC | Facility: CLINIC | Age: 84
End: 2024-09-12
Payer: MEDICARE

## 2024-09-12 NOTE — TELEPHONE ENCOUNTER
Spoke with Lyla. Stated that heart rate elevated to 129-131. Discussed pill change that was discuss with director Little. Patient is still not taking appropriate medications.    Neighbor reports that she is not assisting at this time. The sister is wanting to place Catalina in an assisted living.

## 2024-09-12 NOTE — TELEPHONE ENCOUNTER
Lyla from [facility] called in to inform provider that she has been trying to reach patient all week to schedule an appointment, everytime she calls she gets a busy signal. Pt has also told Ramon not to call sister or sons, just call patient. Lyla states this was the last OT ordered visit

## 2024-09-17 RX ORDER — LEVOTHYROXINE SODIUM 50 UG/1
50 TABLET ORAL DAILY
Qty: 30 TABLET | Refills: 1 | Status: SHIPPED | OUTPATIENT
Start: 2024-09-17 | End: 2024-09-19 | Stop reason: SDUPTHER

## 2024-09-18 ENCOUNTER — TELEPHONE (OUTPATIENT)
Dept: FAMILY MEDICINE CLINIC | Facility: CLINIC | Age: 84
End: 2024-09-18
Payer: MEDICARE

## 2024-09-18 DIAGNOSIS — I48.91 ATRIAL FIBRILLATION WITH RVR: Primary | ICD-10-CM

## 2024-09-18 DIAGNOSIS — E03.9 ACQUIRED HYPOTHYROIDISM: ICD-10-CM

## 2024-09-18 DIAGNOSIS — I50.32 CHRONIC HEART FAILURE WITH PRESERVED EJECTION FRACTION (HFPEF): ICD-10-CM

## 2024-09-18 DIAGNOSIS — D50.8 OTHER IRON DEFICIENCY ANEMIA: ICD-10-CM

## 2024-09-18 DIAGNOSIS — I10 ESSENTIAL (PRIMARY) HYPERTENSION: Chronic | ICD-10-CM

## 2024-09-19 ENCOUNTER — TELEPHONE (OUTPATIENT)
Dept: FAMILY MEDICINE CLINIC | Facility: CLINIC | Age: 84
End: 2024-09-19
Payer: MEDICARE

## 2024-09-19 DIAGNOSIS — R26.89 BALANCE PROBLEM: ICD-10-CM

## 2024-09-19 DIAGNOSIS — I50.32 CHRONIC HEART FAILURE WITH PRESERVED EJECTION FRACTION (HFPEF): ICD-10-CM

## 2024-09-19 DIAGNOSIS — R41.81 AGE-RELATED COGNITIVE DECLINE: ICD-10-CM

## 2024-09-19 DIAGNOSIS — R26.0 ATAXIC GAIT: ICD-10-CM

## 2024-09-19 DIAGNOSIS — F01.B0 MODERATE VASCULAR DEMENTIA WITHOUT BEHAVIORAL DISTURBANCE, PSYCHOTIC DISTURBANCE, MOOD DISTURBANCE, OR ANXIETY: Primary | ICD-10-CM

## 2024-09-19 DIAGNOSIS — Z91.81 AT RISK FOR FALLS: ICD-10-CM

## 2024-09-20 ENCOUNTER — REFERRAL TRIAGE (OUTPATIENT)
Age: 84
End: 2024-09-20
Payer: MEDICARE

## 2024-09-20 RX ORDER — FERROUS GLUCONATE 324(38)MG
324 TABLET ORAL EVERY OTHER DAY
Qty: 45 TABLET | Refills: 1 | Status: SHIPPED | OUTPATIENT
Start: 2024-09-20 | End: 2024-09-23

## 2024-09-20 RX ORDER — SPIRONOLACTONE 25 MG/1
25 TABLET ORAL DAILY
Qty: 90 TABLET | Refills: 1 | Status: SHIPPED | OUTPATIENT
Start: 2024-09-20 | End: 2024-09-23

## 2024-09-20 RX ORDER — LEVOTHYROXINE SODIUM 50 UG/1
50 TABLET ORAL DAILY
Qty: 90 TABLET | Refills: 1 | Status: SHIPPED | OUTPATIENT
Start: 2024-09-20 | End: 2024-09-23

## 2024-09-20 RX ORDER — DILTIAZEM HCL 60 MG
60 TABLET ORAL 2 TIMES DAILY
Qty: 180 TABLET | Refills: 1 | Status: SHIPPED | OUTPATIENT
Start: 2024-09-20 | End: 2024-09-23

## 2024-09-20 RX ORDER — METOPROLOL SUCCINATE 50 MG/1
50 TABLET, EXTENDED RELEASE ORAL 2 TIMES DAILY
Qty: 180 TABLET | Refills: 1 | Status: SHIPPED | OUTPATIENT
Start: 2024-09-20 | End: 2024-09-23

## 2024-09-23 ENCOUNTER — TELEPHONE (OUTPATIENT)
Dept: FAMILY MEDICINE CLINIC | Facility: CLINIC | Age: 84
End: 2024-09-23
Payer: MEDICARE

## 2024-09-23 RX ORDER — LEVOTHYROXINE SODIUM 50 UG/1
50 TABLET ORAL DAILY
Qty: 90 TABLET | Refills: 1 | Status: SHIPPED | OUTPATIENT
Start: 2024-09-23

## 2024-09-23 RX ORDER — DILTIAZEM HCL 60 MG
60 TABLET ORAL 2 TIMES DAILY
Qty: 180 TABLET | Refills: 1 | Status: SHIPPED | OUTPATIENT
Start: 2024-09-23

## 2024-09-23 RX ORDER — SPIRONOLACTONE 25 MG/1
25 TABLET ORAL DAILY
Qty: 90 TABLET | Refills: 1 | Status: SHIPPED | OUTPATIENT
Start: 2024-09-23

## 2024-09-23 RX ORDER — METOPROLOL SUCCINATE 50 MG/1
50 TABLET, EXTENDED RELEASE ORAL 2 TIMES DAILY
Qty: 180 TABLET | Refills: 1 | Status: SHIPPED | OUTPATIENT
Start: 2024-09-23

## 2024-09-23 RX ORDER — FERROUS GLUCONATE 324(38)MG
324 TABLET ORAL EVERY OTHER DAY
Qty: 45 TABLET | Refills: 1 | Status: SHIPPED | OUTPATIENT
Start: 2024-09-23

## 2024-09-24 ENCOUNTER — PATIENT OUTREACH (OUTPATIENT)
Age: 84
End: 2024-09-24
Payer: MEDICARE

## 2024-09-26 ENCOUNTER — TELEPHONE (OUTPATIENT)
Dept: FAMILY MEDICINE CLINIC | Facility: CLINIC | Age: 84
End: 2024-09-26
Payer: MEDICARE

## 2024-09-26 ENCOUNTER — TELEPHONE (OUTPATIENT)
Dept: FAMILY MEDICINE CLINIC | Facility: CLINIC | Age: 84
End: 2024-09-26

## 2024-09-26 NOTE — TELEPHONE ENCOUNTER
Hub staff attempted to follow warm transfer process and was unsuccessful     Caller: LUIS ANTONIO /    Relationship to patient: SISTER      Best call back number: [phone]    Patient is needing: PATIENT IS NOT ANSWERING ANY PHONE CALLS, AND HAS TAKEN HER PHONE OFF THE HOOK      IT'S BEEN ABOUT 2 WKS SINCE SHE'S TALKED WITH RADHA     SHE ALSO HAS NOT CHARGED HER PACEMAKER

## 2024-09-27 NOTE — TELEPHONE ENCOUNTER
Spoke with sister. States that she can not go out to home today. Advise of Stacys instructions. Sister agrees with emergency admit.  Police notified and are being dispateched to home for Welfare check.  Radha called and informed of need for 72 hour hold or emergency admit.

## 2024-09-30 ENCOUNTER — TELEPHONE (OUTPATIENT)
Dept: FAMILY MEDICINE CLINIC | Facility: CLINIC | Age: 84
End: 2024-09-30
Payer: MEDICARE

## 2024-09-30 ENCOUNTER — HOSPITAL ENCOUNTER (INPATIENT)
Facility: HOSPITAL | Age: 84
LOS: 1 days | Discharge: HOME HEALTH | DRG: 309 | End: 2024-10-02
Attending: EMERGENCY MEDICINE | Admitting: HOSPITALIST
Payer: MEDICARE

## 2024-09-30 ENCOUNTER — APPOINTMENT (OUTPATIENT)
Dept: GENERAL RADIOLOGY | Facility: HOSPITAL | Age: 84
DRG: 309 | End: 2024-09-30
Payer: MEDICARE

## 2024-09-30 DIAGNOSIS — I48.91 ATRIAL FIBRILLATION WITH RVR: Primary | ICD-10-CM

## 2024-09-30 DIAGNOSIS — R07.9 CHEST PAIN, UNSPECIFIED TYPE: ICD-10-CM

## 2024-09-30 DIAGNOSIS — M19.90 ARTHRITIS: Chronic | ICD-10-CM

## 2024-09-30 LAB
ALBUMIN SERPL-MCNC: 4.2 G/DL (ref 3.5–5.2)
ALBUMIN/GLOB SERPL: 1.7 G/DL
ALP SERPL-CCNC: 63 U/L (ref 39–117)
ALT SERPL W P-5'-P-CCNC: <5 U/L (ref 1–33)
ANION GAP SERPL CALCULATED.3IONS-SCNC: 10.5 MMOL/L (ref 5–15)
APTT PPP: 29.7 SECONDS (ref 61–76.5)
AST SERPL-CCNC: 15 U/L (ref 1–32)
BASOPHILS # BLD AUTO: 0.05 10*3/MM3 (ref 0–0.2)
BASOPHILS NFR BLD AUTO: 0.8 % (ref 0–1.5)
BILIRUB SERPL-MCNC: 1.2 MG/DL (ref 0–1.2)
BUN SERPL-MCNC: 18 MG/DL (ref 8–23)
BUN/CREAT SERPL: 19.4 (ref 7–25)
CALCIUM SPEC-SCNC: 8.9 MG/DL (ref 8.6–10.5)
CHLORIDE SERPL-SCNC: 106 MMOL/L (ref 98–107)
CO2 SERPL-SCNC: 24.5 MMOL/L (ref 22–29)
CREAT SERPL-MCNC: 0.93 MG/DL (ref 0.57–1)
DEPRECATED RDW RBC AUTO: 61.6 FL (ref 37–54)
EGFRCR SERPLBLD CKD-EPI 2021: 61.1 ML/MIN/1.73
EOSINOPHIL # BLD AUTO: 0.08 10*3/MM3 (ref 0–0.4)
EOSINOPHIL NFR BLD AUTO: 1.3 % (ref 0.3–6.2)
ERYTHROCYTE [DISTWIDTH] IN BLOOD BY AUTOMATED COUNT: 18 % (ref 12.3–15.4)
GEN 5 2HR TROPONIN T REFLEX: 7 NG/L
GLOBULIN UR ELPH-MCNC: 2.5 GM/DL
GLUCOSE SERPL-MCNC: 98 MG/DL (ref 65–99)
HCT VFR BLD AUTO: 43.7 % (ref 34–46.6)
HGB BLD-MCNC: 13.9 G/DL (ref 12–15.9)
HOLD SPECIMEN: NORMAL
HOLD SPECIMEN: NORMAL
IMM GRANULOCYTES # BLD AUTO: 0.01 10*3/MM3 (ref 0–0.05)
IMM GRANULOCYTES NFR BLD AUTO: 0.2 % (ref 0–0.5)
INR PPP: 1.05 (ref 0.93–1.1)
LYMPHOCYTES # BLD AUTO: 1.62 10*3/MM3 (ref 0.7–3.1)
LYMPHOCYTES NFR BLD AUTO: 25.8 % (ref 19.6–45.3)
MAGNESIUM SERPL-MCNC: 2.1 MG/DL (ref 1.6–2.4)
MCH RBC QN AUTO: 29.1 PG (ref 26.6–33)
MCHC RBC AUTO-ENTMCNC: 31.8 G/DL (ref 31.5–35.7)
MCV RBC AUTO: 91.6 FL (ref 79–97)
MONOCYTES # BLD AUTO: 0.45 10*3/MM3 (ref 0.1–0.9)
MONOCYTES NFR BLD AUTO: 7.2 % (ref 5–12)
NEUTROPHILS NFR BLD AUTO: 4.06 10*3/MM3 (ref 1.7–7)
NEUTROPHILS NFR BLD AUTO: 64.7 % (ref 42.7–76)
NRBC BLD AUTO-RTO: 0 /100 WBC (ref 0–0.2)
NT-PROBNP SERPL-MCNC: 1449 PG/ML (ref 0–1800)
PLATELET # BLD AUTO: 267 10*3/MM3 (ref 140–450)
PMV BLD AUTO: 10.3 FL (ref 6–12)
POTASSIUM SERPL-SCNC: 4 MMOL/L (ref 3.5–5.2)
PROT SERPL-MCNC: 6.7 G/DL (ref 6–8.5)
PROTHROMBIN TIME: 11.4 SECONDS (ref 9.6–11.7)
RBC # BLD AUTO: 4.77 10*6/MM3 (ref 3.77–5.28)
SODIUM SERPL-SCNC: 141 MMOL/L (ref 136–145)
TROPONIN T DELTA: -2 NG/L
TROPONIN T SERPL HS-MCNC: 9 NG/L
WBC NRBC COR # BLD AUTO: 6.27 10*3/MM3 (ref 3.4–10.8)
WHOLE BLOOD HOLD COAG: NORMAL
WHOLE BLOOD HOLD SPECIMEN: NORMAL

## 2024-09-30 PROCEDURE — 85610 PROTHROMBIN TIME: CPT | Performed by: EMERGENCY MEDICINE

## 2024-09-30 PROCEDURE — G0378 HOSPITAL OBSERVATION PER HR: HCPCS

## 2024-09-30 PROCEDURE — 36415 COLL VENOUS BLD VENIPUNCTURE: CPT

## 2024-09-30 PROCEDURE — 84484 ASSAY OF TROPONIN QUANT: CPT | Performed by: EMERGENCY MEDICINE

## 2024-09-30 PROCEDURE — 85730 THROMBOPLASTIN TIME PARTIAL: CPT | Performed by: EMERGENCY MEDICINE

## 2024-09-30 PROCEDURE — 85025 COMPLETE CBC W/AUTO DIFF WBC: CPT | Performed by: EMERGENCY MEDICINE

## 2024-09-30 PROCEDURE — 99285 EMERGENCY DEPT VISIT HI MDM: CPT

## 2024-09-30 PROCEDURE — 80053 COMPREHEN METABOLIC PANEL: CPT | Performed by: EMERGENCY MEDICINE

## 2024-09-30 PROCEDURE — 71045 X-RAY EXAM CHEST 1 VIEW: CPT

## 2024-09-30 PROCEDURE — 93005 ELECTROCARDIOGRAM TRACING: CPT | Performed by: EMERGENCY MEDICINE

## 2024-09-30 PROCEDURE — 93005 ELECTROCARDIOGRAM TRACING: CPT

## 2024-09-30 PROCEDURE — 83735 ASSAY OF MAGNESIUM: CPT | Performed by: EMERGENCY MEDICINE

## 2024-09-30 PROCEDURE — 83880 ASSAY OF NATRIURETIC PEPTIDE: CPT | Performed by: NURSE PRACTITIONER

## 2024-09-30 PROCEDURE — 84481 FREE ASSAY (FT-3): CPT | Performed by: HOSPITALIST

## 2024-09-30 RX ORDER — FERROUS SULFATE 324(65)MG
324 TABLET, DELAYED RELEASE (ENTERIC COATED) ORAL
Status: DISCONTINUED | OUTPATIENT
Start: 2024-10-01 | End: 2024-10-02 | Stop reason: HOSPADM

## 2024-09-30 RX ORDER — DILTIAZEM HCL 60 MG
60 TABLET ORAL 2 TIMES DAILY
Status: DISCONTINUED | OUTPATIENT
Start: 2024-09-30 | End: 2024-10-02 | Stop reason: HOSPADM

## 2024-09-30 RX ORDER — LEVOTHYROXINE SODIUM 50 UG/1
50 TABLET ORAL
Status: DISCONTINUED | OUTPATIENT
Start: 2024-10-01 | End: 2024-10-02 | Stop reason: HOSPADM

## 2024-09-30 RX ORDER — DILTIAZEM HCL/D5W 125 MG/125
5-15 PLASTIC BAG, INJECTION (ML) INTRAVENOUS
Status: DISCONTINUED | OUTPATIENT
Start: 2024-09-30 | End: 2024-10-02

## 2024-09-30 RX ORDER — ASPIRIN 325 MG
325 TABLET ORAL ONCE
Status: COMPLETED | OUTPATIENT
Start: 2024-09-30 | End: 2024-09-30

## 2024-09-30 RX ORDER — METOPROLOL SUCCINATE 50 MG/1
50 TABLET, EXTENDED RELEASE ORAL 2 TIMES DAILY
Status: DISCONTINUED | OUTPATIENT
Start: 2024-09-30 | End: 2024-10-02 | Stop reason: HOSPADM

## 2024-09-30 RX ORDER — SPIRONOLACTONE 25 MG/1
25 TABLET ORAL DAILY
Status: DISCONTINUED | OUTPATIENT
Start: 2024-10-01 | End: 2024-10-02 | Stop reason: HOSPADM

## 2024-09-30 RX ORDER — SODIUM CHLORIDE 0.9 % (FLUSH) 0.9 %
10 SYRINGE (ML) INJECTION AS NEEDED
Status: DISCONTINUED | OUTPATIENT
Start: 2024-09-30 | End: 2024-10-02 | Stop reason: HOSPADM

## 2024-09-30 RX ORDER — CYCLOBENZAPRINE HCL 10 MG
5 TABLET ORAL 2 TIMES DAILY PRN
Status: DISCONTINUED | OUTPATIENT
Start: 2024-09-30 | End: 2024-10-02 | Stop reason: HOSPADM

## 2024-09-30 RX ADMIN — APIXABAN 5 MG: 5 TABLET, FILM COATED ORAL at 22:55

## 2024-09-30 RX ADMIN — Medication 5 MG/HR: at 18:35

## 2024-09-30 RX ADMIN — METOPROLOL SUCCINATE 50 MG: 50 TABLET, EXTENDED RELEASE ORAL at 22:55

## 2024-09-30 RX ADMIN — ASPIRIN 325 MG ORAL TABLET 325 MG: 325 PILL ORAL at 15:07

## 2024-09-30 NOTE — Clinical Note
Level of Care: Med/Surg [1]   Admitting Physician: VIVI CLARK [[id number]]   Attending Physician: VIVI CLARK [[id number]]   Bed Request Comments: pcu

## 2024-09-30 NOTE — ED PROVIDER NOTES
Subjective   History of Present Illness  Chief complaint: 84-year-old who presents with shortness of breath and chest pressure.  She states she developed this 2 nights ago but last night was significantly worse.  She called the ambulance today as her pressure in her chest and shortness of breath was persisting.  They told her that she was in A-fib.  She has taken her Xarelto today.  She does have a history of A-fib.  She has not had fever.  She has had some increasing generalized fatigue lately.    Context:    Duration:    Timing:    Severity:    Associated Symptoms:        PCP:  LMP:      Review of Systems   Constitutional:  Positive for fatigue.   HENT: Negative.     Respiratory:  Positive for shortness of breath.    Cardiovascular: Negative.    Gastrointestinal: Negative.    Genitourinary: Negative.    Musculoskeletal: Negative.        Past Medical History:   Diagnosis Date    Acquired spondylolisthesis of lumbosacral region     Acute kidney injury 07/22/2022    Anxiety     Arthritis     Atrial fibrillation     paroxysmal    Broken shoulder     left-- due to fall 11-7-19 was at [facility]    Chronic pain disorder     Chronic systolic CHF (congestive heart failure) 01/05/2023    EF 36-40%    Coronary artery disease involving native coronary artery of native heart without angina pectoris 04/02/2021    nonobstructive    DDD (degenerative disc disease), cervical     DDD (degenerative disc disease), lumbar     Depression     Edema     9/2020 foot    GERD (gastroesophageal reflux disease)     H/O fall     Heart failure with mid-range ejection fraction 03/05/2022    EF 45% per 2D echo    Hypertension     Hypothyroidism     Insomnia     Low back pain     Moderate mitral regurgitation 01/05/2023    Neuropathy     Nonrheumatic tricuspid valve regurgitation     Pain in both feet     Polypharmacy     PONV (postoperative nausea and vomiting)     Pulmonary hypertension     Radiculopathy     Restless legs     Sacroiliitis     Sick  sinus syndrome     Added automatically from request for surgery [id number]    Spondylolisthesis     Stage 3a chronic kidney disease     Urinary incontinence     Vitamin D deficiency        Allergies   Allergen Reactions    Baclofen Rash    Codeine Nausea Only    Ibuprofen Unknown (See Comments)     Patient doesn't know----Motin    Methocarbamol Unknown (See Comments)     Patient doesn't know    Naproxen Unknown (See Comments)     Pt. Doesn't know     Tizanidine Hcl Other (See Comments)     Syncope     Tolmetin Dizziness     Pt. Doesn't know-- same as Tolectin       Past Surgical History:   Procedure Laterality Date    BACK SURGERY  07/19/2018    PDC &  PSF L3-L5 insitu    CARDIAC CATHETERIZATION N/A 4/2/2021    Procedure: Cardiac Catheterization/Vascular Study;  Surgeon: Barrett Evans MD;  Location: [facility] CATH INVASIVE LOCATION;  Service: Cardiovascular;  Laterality: N/A;    CARDIAC ELECTROPHYSIOLOGY PROCEDURE N/A 1/17/2023    Procedure: Pacemaker DC new;  Surgeon: Baljeet Valero MD;  Location: [facility] CATH INVASIVE LOCATION;  Service: Cardiovascular;  Laterality: N/A;    CHOLECYSTECTOMY      COLONOSCOPY      COLONOSCOPY N/A 7/10/2024    Procedure: COLONOSCOPY with cold snare polypectomy x1 and endoscopic clipping x2;  Surgeon: MARLIN Vanegas MD;  Location: [facility] ENDOSCOPY;  Service: Gastroenterology;  Laterality: N/A;  Post- colon polyps, diverticulosis    ENDOSCOPY N/A 9/14/2023    Procedure: ESOPHAGOGASTRODUODENOSCOPY;  Surgeon: Ulysses Lamas MD;  Location: [facility] ENDOSCOPY;  Service: Gastroenterology;  Laterality: N/A;  gastritis, inflammatory gastric polyp    ENDOSCOPY N/A 7/10/2024    Procedure: ESOPHAGOGASTRODUODENOSCOPY;  Surgeon: MARLIN Vanegas MD;  Location: [facility] ENDOSCOPY;  Service: Gastroenterology;  Laterality: N/A;  Post- hiatal hernia, gastric polyps, gastritis    HYSTERECTOMY      ROTATOR CUFF REPAIR Left        Family History   Problem Relation Age of Onset     Cancer Father     Diabetes Son     Cancer Paternal Aunt     Heart disease Paternal Aunt     Cancer Paternal Uncle        Social History     Socioeconomic History    Marital status:    Tobacco Use    Smoking status: Never     Passive exposure: Never    Smokeless tobacco: Never   Vaping Use    Vaping status: Never Used   Substance and Sexual Activity    Alcohol use: No    Drug use: Never    Sexual activity: Defer           Objective   Physical Exam  Vitals and nursing note reviewed.   Constitutional:       Appearance: Normal appearance.   HENT:      Head: Normocephalic.   Neck:      Comments: No jvd  Cardiovascular:      Rate and Rhythm: Tachycardia present. Rhythm irregular.   Pulmonary:      Effort: Pulmonary effort is normal.      Breath sounds: Normal breath sounds.   Abdominal:      Tenderness: There is no abdominal tenderness.   Musculoskeletal:      Cervical back: Normal range of motion and neck supple.   Skin:     General: Skin is warm and dry.   Neurological:      General: No focal deficit present.      Mental Status: She is alert and oriented to person, place, and time.   Psychiatric:         Mood and Affect: Mood normal.         Thought Content: Thought content normal.         Procedures           ED Course                                   Results for orders placed or performed during the hospital encounter of 09/30/24   Comprehensive Metabolic Panel    Specimen: Arm, Right; Blood   Result Value Ref Range    Glucose 98 65 - 99 mg/dL    BUN 18 8 - 23 mg/dL    Creatinine 0.93 0.57 - 1.00 mg/dL    Sodium 141 136 - 145 mmol/L    Potassium 4.0 3.5 - 5.2 mmol/L    Chloride 106 98 - 107 mmol/L    CO2 24.5 22.0 - 29.0 mmol/L    Calcium 8.9 8.6 - 10.5 mg/dL    Total Protein 6.7 6.0 - 8.5 g/dL    Albumin 4.2 3.5 - 5.2 g/dL    ALT (SGPT) <5 1 - 33 U/L    AST (SGOT) 15 1 - 32 U/L    Alkaline Phosphatase 63 39 - 117 U/L    Total Bilirubin 1.2 0.0 - 1.2 mg/dL    Globulin 2.5 gm/dL    A/G Ratio 1.7 g/dL     BUN/Creatinine Ratio 19.4 7.0 - 25.0    Anion Gap 10.5 5.0 - 15.0 mmol/L    eGFR 61.1 >60.0 mL/min/1.73   High Sensitivity Troponin T    Specimen: Arm, Right; Blood   Result Value Ref Range    HS Troponin T 9 <14 ng/L   CBC Auto Differential    Specimen: Blood   Result Value Ref Range    WBC 6.27 3.40 - 10.80 10*3/mm3    RBC 4.77 3.77 - 5.28 10*6/mm3    Hemoglobin 13.9 12.0 - 15.9 g/dL    Hematocrit 43.7 34.0 - 46.6 %    MCV 91.6 79.0 - 97.0 fL    MCH 29.1 26.6 - 33.0 pg    MCHC 31.8 31.5 - 35.7 g/dL    RDW 18.0 (H) 12.3 - 15.4 %    RDW-SD 61.6 (H) 37.0 - 54.0 fl    MPV 10.3 6.0 - 12.0 fL    Platelets 267 140 - 450 10*3/mm3    Neutrophil % 64.7 42.7 - 76.0 %    Lymphocyte % 25.8 19.6 - 45.3 %    Monocyte % 7.2 5.0 - 12.0 %    Eosinophil % 1.3 0.3 - 6.2 %    Basophil % 0.8 0.0 - 1.5 %    Immature Grans % 0.2 0.0 - 0.5 %    Neutrophils, Absolute 4.06 1.70 - 7.00 10*3/mm3    Lymphocytes, Absolute 1.62 0.70 - 3.10 10*3/mm3    Monocytes, Absolute 0.45 0.10 - 0.90 10*3/mm3    Eosinophils, Absolute 0.08 0.00 - 0.40 10*3/mm3    Basophils, Absolute 0.05 0.00 - 0.20 10*3/mm3    Immature Grans, Absolute 0.01 0.00 - 0.05 10*3/mm3    nRBC 0.0 0.0 - 0.2 /100 WBC   Magnesium    Specimen: Arm, Right; Blood   Result Value Ref Range    Magnesium 2.1 1.6 - 2.4 mg/dL   aPTT    Specimen: Blood   Result Value Ref Range    PTT 29.7 (L) 61.0 - 76.5 seconds   Protime-INR    Specimen: Blood   Result Value Ref Range    Protime 11.4 9.6 - 11.7 Seconds    INR 1.05 0.93 - 1.10   ECG 12 Lead ED Triage Standing Order; Chest Pain   Result Value Ref Range    QT Interval 449 ms    QTC Interval 535 ms   Green Top (Gel)   Result Value Ref Range    Extra Tube Hold for add-ons.    Lavender Top   Result Value Ref Range    Extra Tube hold for add-on    Gold Top - SST   Result Value Ref Range    Extra Tube Hold for add-ons.    Light Blue Top   Result Value Ref Range    Extra Tube Hold for add-ons.         XR Chest 1 View    Result Date:  9/30/2024  Impression: No acute process. Electronically Signed: Edil Joshua MD  9/30/2024 3:10 PM EDT  Workstation ID: [id number]          Medical Decision Making  Patient was seen and evaluated for the above problem    Differential diagnosis includes but is not limited to ACS, A-fib with RVR, PE    Patient had chest x-ray reviewed interpreted by myself showed no acute process.  She did have an EKG interpreted by myself A-fib rate of 85.  There is change of axis into a left bundle branch block pattern.  She does have ST inversion in the lateral leads.  Troponin however was normal.  She is anticoagulated.  Her heart rate did creep up and consistently stayed above 120.  She is on Cardizem drip at this point in time.  Magnesium and potassium are normal as well.  Patient to be admitted to the hospital for further cardiac workup and evaluation.  I spoke with  who is on-call for the hospital staff agrees to admit the patient    Amount and/or Complexity of Data Reviewed  Labs: ordered. Decision-making details documented in ED Course.  Radiology: ordered and independent interpretation performed.     Details: X-ray reviewed by myself  ECG/medicine tests: ordered and independent interpretation performed.     Details: [place] by myself as above    Risk  OTC drugs.  Prescription drug management.        Final diagnoses:   None   Chest pain  A-fib    ED Disposition  ED Disposition       None            No follow-up provider specified.       Medication List      No changes were made to your prescriptions during this visit.            Keyur Hernandez,   09/30/24 [id number]

## 2024-09-30 NOTE — TELEPHONE ENCOUNTER
Nya with [facility] called. Catalina is currently in atrial fib w heart rate ranging from  bpm. Pacemaker is lit up but do not know where necklace component is. Patient is feeling very nauseous. Is drinking but not eating. Weight stable.     Police were dispatched to home Friday for well check with no results. Spoke with director-Can at [facility]. SW still working on [facility] admission. They are going to have ambulance dispatched to home to take to [facility] to stabilize patient then admit to [facility] from their.

## 2024-09-30 NOTE — TELEPHONE ENCOUNTER
Nya at  called stated she called [facility], they stated they would mail Levothyroxine and diltiazem to home and neighbor would get mail and take care of dog??  Still can't find necklace for machine?? And she was waiting and was willing to go to hospital by ambulance.

## 2024-09-30 NOTE — H&P
[facility] Medicine Services  History & Physical    Patient Name: Catalina Moreno  : 1940  MRN: [id number]  Primary Care Physician:  Liv Duron APRN  Date of admission: 2024  Date and Time of Service: 2024 at 1945    Subjective      Chief Complaint: shortness of air   History of Present Illness: Catalina Moreno is a 84 y.o. female with a CMH of paroxysmal atrial fibrillation on Eliquis, hypothyroidism, hypertension, anxiety and depression, arthritis, GERD, heart failure with reduced ejection fraction EF 46 to 50% per echo dated 2024.  Who presented to [facility] on 2024 with a 2-day history of shortness of breath .  She denied any chest pain or pressure.  She does report she felt lightheaded when standing up.  No exacerbating or alleviating factors to the shortness of breath.  She arrived here by EMS she was told she had atrial fibrillation.  She denies any cough congestion fever .  She does report increasing generalized fatigue lately..    She was found to be in atrial fibrillation with RVR in the emergency department and was given a Cardizem bolus and ultimately started on a Cardizem drip with improvement in rate.  High-sensitivity troponin is not elevated at 9 and 7, other labs are unremarkable.  She will be admitted for further evaluation and treatment and cardiology has been consulted.  Of note she reports she may have missed a few doses of metoprolol at home because she had a prescription waiting for her but did not pick it up.  She is unclear how many doses she may have missed.  She is stable on room air.    Review of Systems   Constitutional:  Positive for fatigue.   HENT: Negative.     Eyes: Negative.    Respiratory:  Positive for shortness of breath.    Cardiovascular: Negative.    Gastrointestinal: Negative.    Endocrine: Negative.    Genitourinary: Negative.    Musculoskeletal: Negative.    Skin: Negative.    Allergic/Immunologic: Negative.     Neurological:  Positive for light-headedness.   Hematological: Negative.    Psychiatric/Behavioral: Negative.     All other systems reviewed and are negative.      Personal History     Past Medical History:   Diagnosis Date    Acquired spondylolisthesis of lumbosacral region     Acute kidney injury 07/22/2022    Anxiety     Arthritis     Atrial fibrillation     paroxysmal    Broken shoulder     left-- due to fall 11-7-19 was at [facility]    Chronic pain disorder     Chronic systolic CHF (congestive heart failure) 01/05/2023    EF 36-40%    Coronary artery disease involving native coronary artery of native heart without angina pectoris 04/02/2021    nonobstructive    DDD (degenerative disc disease), cervical     DDD (degenerative disc disease), lumbar     Depression     Edema     9/2020 foot    GERD (gastroesophageal reflux disease)     H/O fall     Heart failure with mid-range ejection fraction 03/05/2022    EF 45% per 2D echo    Hypertension     Hypothyroidism     Insomnia     Low back pain     Moderate mitral regurgitation 01/05/2023    Neuropathy     Nonrheumatic tricuspid valve regurgitation     Pain in both feet     Polypharmacy     PONV (postoperative nausea and vomiting)     Pulmonary hypertension     Radiculopathy     Restless legs     Sacroiliitis     Sick sinus syndrome     Added automatically from request for surgery [id number]    Spondylolisthesis     Stage 3a chronic kidney disease     Urinary incontinence     Vitamin D deficiency        Past Surgical History:   Procedure Laterality Date    BACK SURGERY  07/19/2018    PDC &  PSF L3-L5 insitu    CARDIAC CATHETERIZATION N/A 4/2/2021    Procedure: Cardiac Catheterization/Vascular Study;  Surgeon: Barrett Evans MD;  Location: [facility] CATH INVASIVE LOCATION;  Service: Cardiovascular;  Laterality: N/A;    CARDIAC ELECTROPHYSIOLOGY PROCEDURE N/A 1/17/2023    Procedure: Pacemaker DC new;  Surgeon: Baljeet Valero MD;  Location: [facility] CATH INVASIVE  LOCATION;  Service: Cardiovascular;  Laterality: N/A;    CHOLECYSTECTOMY      COLONOSCOPY      COLONOSCOPY N/A 7/10/2024    Procedure: COLONOSCOPY with cold snare polypectomy x1 and endoscopic clipping x2;  Surgeon: MARLIN Vanegas MD;  Location: [facility] ENDOSCOPY;  Service: Gastroenterology;  Laterality: N/A;  Post- colon polyps, diverticulosis    ENDOSCOPY N/A 9/14/2023    Procedure: ESOPHAGOGASTRODUODENOSCOPY;  Surgeon: Ulysses Lamas MD;  Location: [facility] ENDOSCOPY;  Service: Gastroenterology;  Laterality: N/A;  gastritis, inflammatory gastric polyp    ENDOSCOPY N/A 7/10/2024    Procedure: ESOPHAGOGASTRODUODENOSCOPY;  Surgeon: MARLIN Vanegas MD;  Location: [facility] ENDOSCOPY;  Service: Gastroenterology;  Laterality: N/A;  Post- hiatal hernia, gastric polyps, gastritis    HYSTERECTOMY      ROTATOR CUFF REPAIR Left        Family History: family history includes Cancer in her father, paternal aunt, and paternal uncle; Diabetes in her son; Heart disease in her paternal aunt. Otherwise pertinent FHx was reviewed and not pertinent to current issue.    Social History:  reports that she has never smoked. She has never been exposed to tobacco smoke. She has never used smokeless tobacco. She reports that she does not drink alcohol and does not use drugs.    Home Medications:  Prior to Admission Medications       Prescriptions Last Dose Informant Patient Reported? Taking?    apixaban (ELIQUIS) 5 MG tablet tablet 9/30/2024  No Yes    Take 1 tablet by mouth Every 12 (Twelve) Hours. Indications: Atrial Fibrillation    cyclobenzaprine (FLEXERIL) 5 MG tablet   Yes No    Take 1 tablet by mouth 2 (Two) Times a Day As Needed for Muscle Spasms.    dilTIAZem (CARDIZEM) 60 MG tablet   No No    Take 1 tablet by mouth 2 (Two) Times a Day.    ferrous gluconate (FERGON) 324 MG tablet   No No    Take 1 tablet by mouth Every Other Day.    levothyroxine (SYNTHROID, LEVOTHROID) 50 MCG tablet   No No    Take 1 tablet by mouth  Daily.    metoprolol succinate XL (TOPROL-XL) 50 MG 24 hr tablet   No No    Take 1 tablet by mouth 2 (Two) Times a Day. For Afib/Hypertension    spironolactone (Aldactone) 25 MG tablet   No No    Take 1 tablet by mouth Daily. For swelling/CHF              Allergies:  Allergies   Allergen Reactions    Baclofen Rash    Codeine Nausea Only    Ibuprofen Unknown (See Comments)     Patient doesn't know----Motin    Methocarbamol Unknown (See Comments)     Patient doesn't know    Naproxen Unknown (See Comments)     Pt. Doesn't know     Tizanidine Hcl Other (See Comments)     Syncope     Tolmetin Dizziness     Pt. Doesn't know-- same as Tolectin       Objective      Vitals:   Temp:  [97.5 °F (36.4 °C)] 97.5 °F (36.4 °C)  Heart Rate:  [91-99] 99  Resp:  [13-18] 17  BP: (103-144)/(52-87) 120/87  Body mass index is 26.78 kg/m².  Physical Exam  Vitals reviewed.   Constitutional:       Appearance: Normal appearance. She is obese.   HENT:      Head: Normocephalic and atraumatic.      Right Ear: External ear normal.      Left Ear: External ear normal.      Nose: Nose normal.      Mouth/Throat:      Mouth: Mucous membranes are moist.   Eyes:      Extraocular Movements: Extraocular movements intact.   Cardiovascular:      Rate and Rhythm: Normal rate. Rhythm irregular.      Pulses: Normal pulses.      Heart sounds: Normal heart sounds.   Pulmonary:      Effort: Pulmonary effort is normal.      Breath sounds: Normal breath sounds.   Abdominal:      Palpations: Abdomen is soft.   Genitourinary:     Comments: deferred  Musculoskeletal:         General: Normal range of motion.      Cervical back: Normal range of motion and neck supple.   Skin:     General: Skin is warm and dry.   Neurological:      General: No focal deficit present.      Mental Status: She is alert and oriented to person, place, and time.   Psychiatric:         Mood and Affect: Mood normal.         Behavior: Behavior normal.         Thought Content: Thought content  normal.         Judgment: Judgment normal.         Diagnostic Data:  Lab Results (last 24 hours)       Procedure Component Value Units Date/Time    High Sensitivity Troponin T 2Hr [[id number]]  (Normal) Collected: 09/30/24 1724    Specimen: Blood Updated: 09/30/24 1754     HS Troponin T 7 ng/L      Troponin T Delta -2 ng/L     Narrative:      High Sensitive Troponin T Reference Range:  <14.0 ng/L- Negative Female for AMI  <22.0 ng/L- Negative Male for AMI  >=14 - Abnormal Female indicating possible myocardial injury.  >=22 - Abnormal Male indicating possible myocardial injury.   Clinicians would have to utilize clinical acumen, EKG, Troponin, and serial changes to determine if it is an Acute Myocardial Infarction or myocardial injury due to an underlying chronic condition.         Comprehensive Metabolic Panel [[id number]] Collected: 09/30/24 1513    Specimen: Blood from Arm, Right Updated: 09/30/24 1538     Glucose 98 mg/dL      BUN 18 mg/dL      Creatinine 0.93 mg/dL      Sodium 141 mmol/L      Potassium 4.0 mmol/L      Chloride 106 mmol/L      CO2 24.5 mmol/L      Calcium 8.9 mg/dL      Total Protein 6.7 g/dL      Albumin 4.2 g/dL      ALT (SGPT) <5 U/L      AST (SGOT) 15 U/L      Alkaline Phosphatase 63 U/L      Total Bilirubin 1.2 mg/dL      Globulin 2.5 gm/dL      A/G Ratio 1.7 g/dL      BUN/Creatinine Ratio 19.4     Anion Gap 10.5 mmol/L      eGFR 61.1 mL/min/1.73     Narrative:      GFR Normal >60  Chronic Kidney Disease <60  Kidney Failure <15    The GFR formula is only valid for adults with stable renal function between ages 18 and 70.    High Sensitivity Troponin T [[id number]]  (Normal) Collected: 09/30/24 1513    Specimen: Blood from Arm, Right Updated: 09/30/24 1538     HS Troponin T 9 ng/L     Narrative:      High Sensitive Troponin T Reference Range:  <14.0 ng/L- Negative Female for AMI  <22.0 ng/L- Negative Male for AMI  >=14 - Abnormal Female indicating possible myocardial injury.  >=22 - Abnormal  Male indicating possible myocardial injury.   Clinicians would have to utilize clinical acumen, EKG, Troponin, and serial changes to determine if it is an Acute Myocardial Infarction or myocardial injury due to an underlying chronic condition.         Magnesium [[id number]]  (Normal) Collected: 09/30/24 1513    Specimen: Blood from Arm, Right Updated: 09/30/24 1538     Magnesium 2.1 mg/dL     aPTT [[id number]]  (Abnormal) Collected: 09/30/24 1453    Specimen: Blood Updated: 09/30/24 1533     PTT 29.7 seconds     Protime-INR [[id number]]  (Normal) Collected: 09/30/24 1453    Specimen: Blood Updated: 09/30/24 1533     Protime 11.4 Seconds      INR 1.05    CBC & Differential [[id number]]  (Abnormal) Collected: 09/30/24 1453    Specimen: Blood Updated: 09/30/24 1502    Narrative:      The following orders were created for panel order CBC & Differential.  Procedure                               Abnormality         Status                     ---------                               -----------         ------                     CBC Auto Differential[[id number]]        Abnormal            Final result                 Please view results for these tests on the individual orders.    CBC Auto Differential [[id number]]  (Abnormal) Collected: 09/30/24 1453    Specimen: Blood Updated: 09/30/24 1502     WBC 6.27 10*3/mm3      RBC 4.77 10*6/mm3      Hemoglobin 13.9 g/dL      Hematocrit 43.7 %      MCV 91.6 fL      MCH 29.1 pg      MCHC 31.8 g/dL      RDW 18.0 %      RDW-SD 61.6 fl      MPV 10.3 fL      Platelets 267 10*3/mm3      Neutrophil % 64.7 %      Lymphocyte % 25.8 %      Monocyte % 7.2 %      Eosinophil % 1.3 %      Basophil % 0.8 %      Immature Grans % 0.2 %      Neutrophils, Absolute 4.06 10*3/mm3      Lymphocytes, Absolute 1.62 10*3/mm3      Monocytes, Absolute 0.45 10*3/mm3      Eosinophils, Absolute 0.08 10*3/mm3      Basophils, Absolute 0.05 10*3/mm3      Immature Grans, Absolute 0.01 10*3/mm3      nRBC 0.0 /100 WBC      [place] Draw [[id number]] Collected: 09/30/24 1453    Specimen: Blood Updated: 09/30/24 1500    Narrative:      The following orders were created for panel order [place] Draw.  Procedure                               Abnormality         Status                     ---------                               -----------         ------                     Green Top (Gel)[[id number]]                                  Final result               Lavender Top[[id number]]                                     Final result               Gold Top - SST[[id number]]                                   Final result               Light Blue Top[[id number]]                                   Final result                 Please view results for these tests on the individual orders.    Green Top (Gel) [[id number]] Collected: 09/30/24 1453    Specimen: Blood Updated: 09/30/24 1500     Extra Tube Hold for add-ons.     Comment: Auto resulted.       Lavender Top [[id number]] Collected: 09/30/24 1453    Specimen: Blood Updated: 09/30/24 1500     Extra Tube hold for add-on     Comment: Auto resulted       Gold Top - SST [[id number]] Collected: 09/30/24 1453    Specimen: Blood Updated: 09/30/24 1500     Extra Tube Hold for add-ons.     Comment: Auto resulted.       Light Blue Top [[id number]] Collected: 09/30/24 1453    Specimen: Blood Updated: 09/30/24 1500     Extra Tube Hold for add-ons.     Comment: Auto resulted                Imaging Results (Last 24 Hours)       Procedure Component Value Units Date/Time    XR Chest 1 View [[id number]] Collected: 09/30/24 1503     Updated: 09/30/24 1512    Narrative:      XR CHEST 1 VW    Date of Exam: 9/30/2024 2:59 PM EDT    Indication: Chest Pain Triage Protocol    Comparison: 7/24/2024    Findings:  Patient rotated to the right. Left-sided AICD noted. The lungs are without consolidation. Negative for pneumothorax. No pleural effusion. Osseous structures appear intact.      Impression:      Impression:  No acute  process.        Electronically Signed: Edil Joshua MD    9/30/2024 3:10 PM EDT    Workstation ID: [id number]              Assessment & Plan        This is a 84 y.o. female with:    Active and Resolved Problems  Active Hospital Problems    Diagnosis  POA    **Atrial fibrillation with RVR [I48.91]  Yes     Priority: High    Chronic HFrEF (heart failure with reduced ejection fraction) [I50.22]  Yes    Restless legs syndrome [G25.81]  Yes    Generalized anxiety disorder [F41.1]  Yes    Gastro-esophageal reflux disease without esophagitis [K21.9]  Yes    Stage 3a chronic kidney disease [N18.31]  Yes    Hypothyroidism [E03.9]  Yes    Depressive disorder [F32.A]  Yes      Resolved Hospital Problems   No resolved problems to display.     Of note home medications were verified by sidebar summary from prescription    Atrial fibrillation with RVR, troponin negative, now stable rate on Cardizem drip, hold home p.o. Cardizem for now, reordered home metoprolol and apixaban continuous cardiac monitoring cardiology consulted    Chronic heart failure reduced ejection fraction, stable on room air on home spironolactone cardiology consulted    Restless leg syndrome, on home Flexeril as needed    Generalized anxiety disorder, no current home meds    GERD, not on PPI no complaints    Stage IIIa chronic kidney disease stable    Hypothyroidism, reordered home levothyroxine TSH ordered and pending    Depressive disorder, no current home meds stable      VTE Prophylaxis:  No VTE prophylaxis order currently exists.        The patient desires to be as follows:    CODE STATUS:    Code Status (Patient has no pulse and is not breathing): CPR (Attempt to Resuscitate)  Medical Interventions (Patient has pulse or is breathing): Full Support            Admission Status:  I believe this patient meets observation status.    Expected Length of Stay: pending cardiology evaluation and clinical course     PDMP and Medication Dispenses via Sidebar reviewed  and consistent with patient reported medications.    I discussed the patient's findings and my recommendations with patient.      Signature:     This document has been electronically signed by KALYANI Guerrero on September 30, 2024 19:26 EDT   [facility]

## 2024-10-01 LAB
QT INTERVAL: 374 MS
QT INTERVAL: 449 MS
QTC INTERVAL: 464 MS
QTC INTERVAL: 535 MS
T3FREE SERPL-MCNC: 1.67 PG/ML (ref 2–4.4)
T4 FREE SERPL-MCNC: 1.2 NG/DL (ref 0.93–1.7)
TROPONIN T SERPL HS-MCNC: 12 NG/L
TSH SERPL DL<=0.05 MIU/L-ACNC: 13.9 UIU/ML (ref 0.27–4.2)

## 2024-10-01 PROCEDURE — 93005 ELECTROCARDIOGRAM TRACING: CPT | Performed by: EMERGENCY MEDICINE

## 2024-10-01 PROCEDURE — 84484 ASSAY OF TROPONIN QUANT: CPT | Performed by: NURSE PRACTITIONER

## 2024-10-01 PROCEDURE — 84443 ASSAY THYROID STIM HORMONE: CPT | Performed by: NURSE PRACTITIONER

## 2024-10-01 PROCEDURE — 99222 1ST HOSP IP/OBS MODERATE 55: CPT

## 2024-10-01 PROCEDURE — 93010 ELECTROCARDIOGRAM REPORT: CPT | Performed by: INTERNAL MEDICINE

## 2024-10-01 PROCEDURE — 97161 PT EVAL LOW COMPLEX 20 MIN: CPT

## 2024-10-01 PROCEDURE — 99222 1ST HOSP IP/OBS MODERATE 55: CPT | Performed by: INTERNAL MEDICINE

## 2024-10-01 PROCEDURE — 84439 ASSAY OF FREE THYROXINE: CPT | Performed by: HOSPITALIST

## 2024-10-01 RX ORDER — HYDROCODONE BITARTRATE AND ACETAMINOPHEN 5; 325 MG/1; MG/1
1 TABLET ORAL ONCE AS NEEDED
Status: COMPLETED | OUTPATIENT
Start: 2024-10-01 | End: 2024-10-01

## 2024-10-01 RX ORDER — AMIODARONE HYDROCHLORIDE 200 MG/1
200 TABLET ORAL DAILY
COMMUNITY
End: 2024-10-02 | Stop reason: HOSPADM

## 2024-10-01 RX ORDER — PANTOPRAZOLE SODIUM 40 MG/1
40 TABLET, DELAYED RELEASE ORAL DAILY
COMMUNITY

## 2024-10-01 RX ORDER — ACETAMINOPHEN 325 MG/1
650 TABLET ORAL EVERY 6 HOURS PRN
Status: DISCONTINUED | OUTPATIENT
Start: 2024-10-01 | End: 2024-10-02 | Stop reason: HOSPADM

## 2024-10-01 RX ORDER — DIGOXIN 250 MCG
250 TABLET ORAL DAILY
Status: DISCONTINUED | OUTPATIENT
Start: 2024-10-01 | End: 2024-10-02 | Stop reason: HOSPADM

## 2024-10-01 RX ORDER — NITROGLYCERIN 0.4 MG/1
0.4 TABLET SUBLINGUAL
Status: DISCONTINUED | OUTPATIENT
Start: 2024-10-01 | End: 2024-10-02 | Stop reason: HOSPADM

## 2024-10-01 RX ORDER — SPIRONOLACTONE 25 MG/1
25 TABLET ORAL DAILY PRN
COMMUNITY

## 2024-10-01 RX ORDER — METOPROLOL SUCCINATE 25 MG/1
0.5 TABLET, EXTENDED RELEASE ORAL DAILY
COMMUNITY
End: 2024-10-02 | Stop reason: HOSPADM

## 2024-10-01 RX ADMIN — FERROUS SULFATE TAB EC 324 MG (65 MG FE EQUIVALENT) 324 MG: 324 (65 FE) TABLET DELAYED RESPONSE at 09:02

## 2024-10-01 RX ADMIN — LEVOTHYROXINE SODIUM 50 MCG: 0.05 TABLET ORAL at 05:45

## 2024-10-01 RX ADMIN — APIXABAN 5 MG: 5 TABLET, FILM COATED ORAL at 20:11

## 2024-10-01 RX ADMIN — Medication 10 ML: at 20:11

## 2024-10-01 RX ADMIN — SPIRONOLACTONE 25 MG: 25 TABLET ORAL at 09:04

## 2024-10-01 RX ADMIN — Medication 10 ML: at 09:05

## 2024-10-01 RX ADMIN — HYDROCODONE BITARTRATE AND ACETAMINOPHEN 1 TABLET: 5; 325 TABLET ORAL at 02:11

## 2024-10-01 RX ADMIN — APIXABAN 5 MG: 5 TABLET, FILM COATED ORAL at 09:01

## 2024-10-01 RX ADMIN — METOPROLOL SUCCINATE 50 MG: 50 TABLET, EXTENDED RELEASE ORAL at 09:02

## 2024-10-01 RX ADMIN — METOPROLOL SUCCINATE 50 MG: 50 TABLET, EXTENDED RELEASE ORAL at 20:11

## 2024-10-01 RX ADMIN — ACETAMINOPHEN 650 MG: 325 TABLET, FILM COATED ORAL at 20:11

## 2024-10-01 RX ADMIN — DIGOXIN 250 MCG: 250 TABLET ORAL at 15:57

## 2024-10-01 RX ADMIN — NITROGLYCERIN 0.4 MG: 0.4 TABLET SUBLINGUAL at 04:25

## 2024-10-01 NOTE — CASE MANAGEMENT/SOCIAL WORK
Discharge Planning Assessment   Gabriel     Patient Name: Catalina Moreno  MRN: [id number]  Today's Date: 10/1/2024    Admit Date: 9/30/2024    Plan: PT christina pending. From home alone with [facility] (see comments).   Discharge Needs Assessment       Row Name 10/01/24 1246       Living Environment    People in Home alone    Current Living Arrangements home    Potentially Unsafe Housing Conditions none    In the past 12 months has the electric, gas, oil, or water company threatened to shut off services in your home? No    Primary Care Provided by self    Provides Primary Care For no one    Family Caregiver if Needed child(evangelista), adult    Family Caregiver Names Sons- Jordan and Karri    Quality of Family Relationships unable to assess    Able to Return to Prior Arrangements yes       Resource/Environmental Concerns    Resource/Environmental Concerns none    Transportation Concerns none       Transportation Needs    In the past 12 months, has lack of transportation kept you from medical appointments or from getting medications? no    In the past 12 months, has lack of transportation kept you from meetings, work, or from getting things needed for daily living? No       Food Insecurity    Within the past 12 months, you worried that your food would run out before you got the money to buy more. Never true    Within the past 12 months, the food you bought just didn't last and you didn't have money to get more. Never true       Transition Planning    Patient/Family Anticipates Transition to home    Patient/Family Anticipated Services at Transition home health care    Transportation Anticipated family or friend will provide       Discharge Needs Assessment    Readmission Within the Last 30 Days no previous admission in last 30 days    Equipment Currently Used at Home none    Concerns to be Addressed care coordination/care conferences;discharge planning    Anticipated Changes Related to Illness none    Equipment Needed  After Discharge none    Discharge Facility/Level of Care Needs home with home health    Provided Post Acute Provider List? N/A                   Discharge Plan       Row Name 10/01/24 1248       Plan    Plan PT eval pending. [facility]    Patient/Family in Agreement with Plan yes    Plan Comments CM met with patient at bedside to discuss dc planning and IMM letter. PCP and pharmacy confirmed, reported no trouble affording food/medications, and declined needs at this time for any DME/HH/PT services. [facility] Danni JUNG that patient was current. CM added in basket. Liaison reported that  was following patient for possible assisted living facility placement. Liaison reported that HH staff have noted that patient lives alone and does not adequately provide care for herself or make decisions regarding her care at home. Liaison reported that patient needed a higher level of care than home health could provide at this time. PT recommendations pending.                  Continued Care and Services - Admitted Since 9/30/2024       Home Medical Care       Service Provider Request Status Selected Services Address Phone Fax Patient Preferred    [facility] [place] Pending - Request Sent N/A [address] [place] [address] [phone]                    Demographic Summary       Row Name 10/01/24 1245       General Information    Admission Type inpatient    Arrived From emergency department    Required Notices Provided Important Message from Medicare    Referral Source admission list    Reason for Consult discharge planning    Preferred Language English       Contact Information    Permission Granted to Share Info With                    Functional Status       Row Name 10/01/24 1246       Functional Status    Usual Activity Tolerance moderate    Current Activity  Tolerance moderate       Functional Status, IADL    Medications independent    Meal Preparation independent    Housekeeping independent    Laundry independent    Shopping independent                Kathy Celeste RN     Office Phone: [phone]  Office Cell: [phone]

## 2024-10-01 NOTE — CONSULTS
[facility] [place]   CONSULT NOTE    Referring Provider: Melania Jo APRN   Reason for Consultation: Atrial fibrilation with RVR    Patient Care Team:  Liv Duron APRN as PCP - General (Nurse Practitioner)  Flash Gonzalez MD as Consulting Physician (Cardiology)  Shefali Worthy MD as Consulting Physician (Pain Medicine)  Demetrice Gupta APRN as Nurse Practitioner (Cardiology)    Chief complaint shortness of breath, fatigue    History of present illness:  Catalina Moreno is a 84 y.o. female with past medical history of atrial fibrillation anticoagulated with Eliquis, sick sinus syndrome s/p permanent pacemaker implantation, CAD, hypertension, CKD, HFmEF, medical noncompliance who presented to [facility] on 9/30/2024 with shortness of breath, fatigue, lightheadedness/dizziness.  Patient reports progressively worsening symptoms over the last 2 days.  Patient denies chest pain, edema, syncope.  Cardiology consulted for atrial fibrillation with RVR.  Patient received Cardizem bolus and rates improved.  She was restarted on oral medical therapy.  Patient remains in atrial fibrillation with controlled ventricular response.  Patient has been admitted multiple times within the last few months with symptomatic A-fib RVR.  During initial admission patient was scheduled for cardioversion, however, due to anemia GI was consulted and cardioversion was canceled and planned outpatient.  However, patient has no showed multiple appointments to schedule cardioversion.  Recent echocardiogram with midrange LVEF of 45 to 50% with moderate to severe tricuspid and mitral valve regurgitation and moderate pulmonary hypertension noted.  ED workup with unremarkable CMP, troponin, BNP, CXR.    Review of Systems   Constitutional: Positive for malaise/fatigue. Negative for chills and fever.   Cardiovascular:  Positive for dyspnea on exertion and palpitations. Negative for chest pain and syncope.    Respiratory:  Positive for shortness of breath. Negative for cough.    Gastrointestinal:  Negative for abdominal pain, nausea and vomiting.   Neurological:  Positive for dizziness and light-headedness. Negative for weakness.   Psychiatric/Behavioral:  Negative for altered mental status.        History  Past Medical History:   Diagnosis Date    Acquired spondylolisthesis of lumbosacral region     Acute kidney injury 07/22/2022    Anxiety     Arthritis     Atrial fibrillation     paroxysmal    Broken shoulder     left-- due to fall 11-7-19 was at [facility]    Chronic pain disorder     Chronic systolic CHF (congestive heart failure) 01/05/2023    EF 36-40%    Coronary artery disease involving native coronary artery of native heart without angina pectoris 04/02/2021    nonobstructive    DDD (degenerative disc disease), cervical     DDD (degenerative disc disease), lumbar     Depression     Edema     9/2020 foot    GERD (gastroesophageal reflux disease)     H/O fall     Heart failure with mid-range ejection fraction 03/05/2022    EF 45% per 2D echo    Hypertension     Hypothyroidism     Insomnia     Low back pain     Moderate mitral regurgitation 01/05/2023    Neuropathy     Nonrheumatic tricuspid valve regurgitation     Pain in both feet     Polypharmacy     PONV (postoperative nausea and vomiting)     Pulmonary hypertension     Radiculopathy     Restless legs     Sacroiliitis     Sick sinus syndrome     Added automatically from request for surgery [id number]    Spondylolisthesis     Stage 3a chronic kidney disease     Urinary incontinence     Vitamin D deficiency        Past Surgical History:   Procedure Laterality Date    BACK SURGERY  07/19/2018    PDC &  PSF L3-L5 insitu    CARDIAC CATHETERIZATION N/A 4/2/2021    Procedure: Cardiac Catheterization/Vascular Study;  Surgeon: Barrett Evans MD;  Location: [facility] INVASIVE LOCATION;  Service: Cardiovascular;  Laterality: N/A;    CARDIAC ELECTROPHYSIOLOGY  PROCEDURE N/A 1/17/2023    Procedure: Pacemaker DC new;  Surgeon: Baljeet Valero MD;  Location: [facility] CATH INVASIVE LOCATION;  Service: Cardiovascular;  Laterality: N/A;    CHOLECYSTECTOMY      COLONOSCOPY      COLONOSCOPY N/A 7/10/2024    Procedure: COLONOSCOPY with cold snare polypectomy x1 and endoscopic clipping x2;  Surgeon: MARLIN Vanegas MD;  Location: [facility] ENDOSCOPY;  Service: Gastroenterology;  Laterality: N/A;  Post- colon polyps, diverticulosis    ENDOSCOPY N/A 9/14/2023    Procedure: ESOPHAGOGASTRODUODENOSCOPY;  Surgeon: Ulysses Lamas MD;  Location: [facility] ENDOSCOPY;  Service: Gastroenterology;  Laterality: N/A;  gastritis, inflammatory gastric polyp    ENDOSCOPY N/A 7/10/2024    Procedure: ESOPHAGOGASTRODUODENOSCOPY;  Surgeon: MARLIN Vanegas MD;  Location: [facility] ENDOSCOPY;  Service: Gastroenterology;  Laterality: N/A;  Post- hiatal hernia, gastric polyps, gastritis    HYSTERECTOMY      ROTATOR CUFF REPAIR Left        Family History   Problem Relation Age of Onset    Cancer Father     Diabetes Son     Cancer Paternal Aunt     Heart disease Paternal Aunt     Cancer Paternal Uncle        Social History     Tobacco Use    Smoking status: Never     Passive exposure: Never    Smokeless tobacco: Never   Vaping Use    Vaping status: Never Used   Substance Use Topics    Alcohol use: No    Drug use: Never        Medications Prior to Admission   Medication Sig Dispense Refill Last Dose    apixaban (ELIQUIS) 5 MG tablet tablet Take 1 tablet by mouth Every 12 (Twelve) Hours. Indications: Atrial Fibrillation 180 tablet 1 9/29/2024    cyclobenzaprine (FLEXERIL) 5 MG tablet Take 1 tablet by mouth 2 (Two) Times a Day As Needed for Muscle Spasms.   Past Month    dilTIAZem (CARDIZEM) 60 MG tablet Take 1 tablet by mouth 2 (Two) Times a Day. 180 tablet 1 9/30/2024    ferrous gluconate (FERGON) 324 MG tablet Take 1 tablet by mouth Every Other Day. 45 tablet 1 9/29/2024    levothyroxine (SYNTHROID,  "LEVOTHROID) 50 MCG tablet Take 1 tablet by mouth Daily. 90 tablet 1 9/30/2024    metoprolol succinate XL (TOPROL-XL) 50 MG 24 hr tablet Take 1 tablet by mouth 2 (Two) Times a Day. For Afib/Hypertension 180 tablet 1 9/30/2024    spironolactone (Aldactone) 25 MG tablet Take 1 tablet by mouth Daily. For swelling/CHF 90 tablet 1 Unknown         Baclofen, Codeine, Ibuprofen, Methocarbamol, Naproxen, Tizanidine hcl, and Tolmetin    Scheduled Meds:apixaban, 5 mg, Oral, Q12H  [Held by provider] dilTIAZem, 60 mg, Oral, BID  ferrous sulfate, 324 mg, Oral, Daily With Breakfast  levothyroxine, 50 mcg, Oral, Q AM  metoprolol succinate XL, 50 mg, Oral, BID  spironolactone, 25 mg, Oral, Daily      Continuous Infusions:dilTIAZem, 5-15 mg/hr, Last Rate: Stopped (09/30/24 2250)      PRN Meds:.  cyclobenzaprine    nitroglycerin    sodium chloride        VITAL SIGNS  Vitals:    10/01/24 0207 10/01/24 0425 10/01/24 0813 10/01/24 0902   BP: 95/54 112/66 92/56 100/52   BP Location: Left arm Left arm Left arm    Patient Position: Sitting Lying Lying    Pulse: 73 89 95 84   Resp: 12 15 14    Temp: 97.7 °F (36.5 °C) 97.9 °F (36.6 °C) 97.7 °F (36.5 °C)    TempSrc: Oral Oral Oral    SpO2: 97% 99% 96%    Weight:  70.7 kg (155 lb 13.8 oz)     Height:           Flowsheet Rows      Flowsheet Row First Filed Value   Admission Height 162.6 cm (64\") Documented at 09/30/2024 1440   Admission Weight 70.8 kg (156 lb) Documented at 09/30/2024 1440             TELEMETRY: Atrial fibrillation with controlled ventricular response    Physical Exam:  Vitals reviewed.   Constitutional:       Appearance: Well-developed and not in distress.   Eyes:      Pupils: Pupils are equal, round, and reactive to light.   Pulmonary:      Effort: Pulmonary effort is normal.      Breath sounds: Normal breath sounds.   Cardiovascular:      Normal rate. Irregularly irregular rhythm.   Pulses:     Intact distal pulses.   Edema:     Peripheral edema absent.   Abdominal:      " Palpations: Abdomen is soft.   Musculoskeletal:      Cervical back: Normal range of motion and neck supple. Skin:     General: Skin is warm and dry.   Neurological:      General: No focal deficit present.      Mental Status: Alert and oriented to person, place and time.         LAB RESULTS (LAST 7 DAYS)    CBC  Results from last 7 days   Lab Units 09/30/24  1453   WBC 10*3/mm3 6.27   RBC 10*6/mm3 4.77   HEMOGLOBIN g/dL 13.9   HEMATOCRIT % 43.7   MCV fL 91.6   PLATELETS 10*3/mm3 267       BMP  Results from last 7 days   Lab Units 09/30/24  1513   SODIUM mmol/L 141   POTASSIUM mmol/L 4.0   CHLORIDE mmol/L 106   CO2 mmol/L 24.5   BUN mg/dL 18   CREATININE mg/dL 0.93   GLUCOSE mg/dL 98   MAGNESIUM mg/dL 2.1       CMP   Results from last 7 days   Lab Units 09/30/24  1513   SODIUM mmol/L 141   POTASSIUM mmol/L 4.0   CHLORIDE mmol/L 106   CO2 mmol/L 24.5   BUN mg/dL 18   CREATININE mg/dL 0.93   GLUCOSE mg/dL 98   ALBUMIN g/dL 4.2   BILIRUBIN mg/dL 1.2   ALK PHOS U/L 63   AST (SGOT) U/L 15   ALT (SGPT) U/L <5         BNP        TROPONIN  Results from last 7 days   Lab Units 10/01/24  0411   HSTROP T ng/L 12       CoAg  Results from last 7 days   Lab Units 09/30/24  1453   INR  1.05   APTT seconds 29.7*       Creatinine Clearance  Estimated Creatinine Clearance: 44.2 mL/min (by C-G formula based on SCr of 0.93 mg/dL).    ABG        Radiology  XR Chest 1 View    Result Date: 9/30/2024  Impression: No acute process. Electronically Signed: Edil Joshua MD  9/30/2024 3:10 PM EDT  Workstation ID: [id number]         EKG      I personally viewed and interpreted the patient's EKG/Telemetry data:    ECHOCARDIOGRAM:    Results for orders placed during the hospital encounter of 07/05/24    Adult Transthoracic Echo Complete w/ Color, Spectral and Contrast if necessary per protocol    Interpretation Summary    Left ventricular ejection fraction appears to be 46 - 50%.    Left ventricular wall thickness is consistent with moderate  concentric hypertrophy.    The left atrial cavity is severely dilated.    The right atrial cavity is moderate to severely  dilated.    Abnormal mitral valve structure consistent with dilated annulus.    Moderate to severe mitral valve regurgitation is present with a centrally-directed jet noted.    Moderate to severe tricuspid valve regurgitation is present.    Estimated right ventricular systolic pressure from tricuspid regurgitation is moderately elevated (45-55 mmHg).    Transthoracic echocardiography reveals moderate LVH with overall EF between 45 to 50%  Moderate to severe central mitral regurgitation and moderate to severe TR with moderate pulm hypertension with mild to moderate AI.  Severe left atrial enlargement and moderate right atrial enlargement and no effusion.    Electronically signed by Baljeet Valero MD, 07/06/24, 3:26 PM EDT.      STRESS MYOVIEW:  Results for orders placed during the hospital encounter of 03/31/21    Stress Test With Myocardial Perfusion One Day    Interpretation Summary  · Findings consistent with a normal ECG stress test.  · Left ventricular ejection fraction is hyperdynamic (Calculated EF > 70%). .  · Impressions are consistent with a study indicating uncertain risk.  · Large apical defect with some reperfusion during the rest images cannot rule out ischemia EF 76% Clinical correlation is recommended.    Electronically signed by KALYANI Tay, 04/01/21, 11:25 AM EDT.       CARDIAC CATHETERIZATION:  Results for orders placed during the hospital encounter of 03/31/21    Cardiac Catheterization/Vascular Study    Narrative  CARDIAC CATHETERIZATION REPORT    DATE OF PROCEDURE: 4/2/2021      INDICATION FOR PROCEDURE: Abnormal stress test    PROCEDURE PERFORMED: Left heart catheterization, coronary angiography,    PROCEDURE COMMENTS:    All the risks and benefits explained with the patient informed consent was obtained from the patient.  Patient was brought to the cardiac  catheterization laboratory placed on the table draped and prepped in the usual sterile fashion.  2% lidocaine was used to anesthetize the right groin area.  Using the modified Seldinger technique 5 Marshallese sheath was inserted into the right femoral artery.    5 Marshallese JL4 catheter was used to perform the selective left coronary angiography    5 Marshallese JR4 catheter was used to perform the selective right coronary angiography    Angio-Seal was placed after exchanging five Marshallese sheath with six Marshallese sheath    Patient tolerated procedure well without any immediate complications      FINDINGS:    1. HEMODYNAMICS:  LV end-diastolic pressure 7 mmHg, /80 mmHg.    2. LEFT VENTRICULOGRAPHY: Not done patient had echocardiogram    3. CORONARY ANGIOGRAPHY:  Dominance right  Left Main: Large-caliber vessel bifurcates into LAD circumflex artery 0% stenosis  LAD: Large-caliber vessel gives rise to couple of medium caliber diagonal arteries multiple septal branches reaches the apex mild luminal irregularities noted less than 10% stenosis  CIRC: Large-caliber vessel gives rise to marginal lateral branches less than 10% stenosis  RCA: Large-caliber dominant artery gives rise to PDA and PL branches less than 5 to 10% stenosis    SUMMARY: No obstructive coronary artery disease    RECOMMENDATIONS: Evaluate for noncardiac causes of symptoms aggressive cardiac risk factor modification       OTHER:         Assessment & Plan       Atrial fibrillation with RVR    Depressive disorder    Hypothyroidism    Stage 3a chronic kidney disease    Generalized anxiety disorder    Restless legs syndrome    Gastro-esophageal reflux disease without esophagitis    Chronic HFrEF (heart failure with reduced ejection fraction)    Atrial fibrillation with rapid ventricular response  Patient presented with shortness of breath, fatigue  UDAY elevated  Rates improved with IV Cardizem  EKG showing atrial fibrillation with controlled ventricular  response  Eliquis for anticoagulation  Continue metoprolol succinate  Prior admissions for atrial fibrillation with plans for outpatient CV  However, patient noncompliant with outpatient visits  Consider inpatient cardioversion as patient reports compliance with anticoagulation  EP consult    History of sick sinus syndrome  Dual-chamber permanent pacemaker (2023)   Patient noncompliant with outpatient follow-ups and device interrogation     HFmEF  Echocardiogram with LVEF of 46 to 50%  Continue metoprolol succinate, Aldactone  proBNP 1449     Coronary artery disease  Cardiac catheterization from 2021 with minimal disease  Denies chest pain  Continue beta-blocker, Eliquis     Valvular disease  Moderate to severe mitral and tricuspid valve regurgitation  Severe atrial enlargement  Moderate pulmonary hypertension  Continue beta-blocker  Consider low-dose ACE inhibitor if pressure allows for afterload reduction    Hypertension  Blood pressure labile  Continue beta-blocker    Medical noncompliance  Extensive history of outpatient no-show appointments  Patient reports transportation disease  Recommend social work consultation    I discussed the patients findings and my recommendations with patient and nurse    Demetrice Gupta, KALYANI  10/01/24  09:55 EDT

## 2024-10-01 NOTE — DISCHARGE PLACEMENT REQUEST
"Catalina Carrera (84 y.o. Female)       Date of Birth   1940    Social Security Number       Address   [address] [place] [address] [place] [facility]    Home Phone   [phone]    MRN   [id number]       Restorationist   None    Marital Status                               Admission Date   9/30/24    Admission Type   Emergency    Admitting Provider   Gaston Lopez MD    Attending Provider   Gaston Lopez MD    Department, Room/Bed   [facility], 2126/1       Discharge Date       Discharge Disposition       Discharge Destination                                 Attending Provider: Gaston Lopez MD    Allergies: Baclofen, Codeine, Ibuprofen, Methocarbamol, Naproxen, Tizanidine Hcl, Tolmetin    Isolation: None   Infection: None   Code Status: CPR    Ht: 162.6 cm (64\")   Wt: 70.7 kg (155 lb 13.8 oz)    Admission Cmt: None   Principal Problem: Atrial fibrillation with RVR [I48.91]                   Active Insurance as of 9/30/2024       Primary Coverage       Payor Plan Insurance Group Employer/Plan Group    MEDICARE MEDICARE A & B        Payor Plan Address Payor Plan Phone Number Payor Plan Fax Number Effective Dates    [address] [phone]  4/1/2006 - None Entered    [facility] [place] [address]         Subscriber Name Subscriber Birth Date Member ID       CATALINA CARRERA 1940 [id number]               Secondary Coverage       Payor Plan Insurance Group Employer/Plan Group    ANTHEM BLUE CROSS ANTHEM BLUE CROSS BLUE SHIELD PPO [id number]       Payor Plan Address Payor Plan Phone Number Payor Plan Fax Number Effective Dates    [address] [phone]  2/2/2022 - None Entered    [facility] [address]         Subscriber Name Subscriber Birth Date Member ID       CATALINA CARRERA 1940 [id number]                     Emergency Contacts        (Rel.) Home Phone Work Phone Mobile Phone    MADHAV DEAN (Son) [phone] -- [phone]    aramis dean " (Son) -- -- [phone]    Shwetha Eldridge (Neighbor) [phone] -- --                 History & Physical        Essing-Melania Collazo APRN at 24       Attestation signed by Llanes Alvarez, Carlos, MD at 10/01/24 0141    I have reviewed this documentation and agree.                      [facility] Medicine Services  History & Physical    Patient Name: Catalina Moreno  : 1940  MRN: [id number]  Primary Care Physician:  Liv Duron APRN  Date of admission: 2024  Date and Time of Service: 2024 at 1945    Subjective      Chief Complaint: shortness of air   History of Present Illness: Catalina Moreno is a 84 y.o. female with a CMH of paroxysmal atrial fibrillation on Eliquis, hypothyroidism, hypertension, anxiety and depression, arthritis, GERD, heart failure with reduced ejection fraction EF 46 to 50% per echo dated 2024.  Who presented to [facility] on 2024 with a 2-day history of shortness of breath .  She denied any chest pain or pressure.  She does report she felt lightheaded when standing up.  No exacerbating or alleviating factors to the shortness of breath.  She arrived here by EMS she was told she had atrial fibrillation.  She denies any cough congestion fever .  She does report increasing generalized fatigue lately..    She was found to be in atrial fibrillation with RVR in the emergency department and was given a Cardizem bolus and ultimately started on a Cardizem drip with improvement in rate.  High-sensitivity troponin is not elevated at 9 and 7, other labs are unremarkable.  She will be admitted for further evaluation and treatment and cardiology has been consulted.  Of note she reports she may have missed a few doses of metoprolol at home because she had a prescription waiting for her but did not pick it up.  She is unclear how many doses she may have missed.  She is stable on room air.    Review of Systems   Constitutional:  Positive for fatigue.    HENT: Negative.     Eyes: Negative.    Respiratory:  Positive for shortness of breath.    Cardiovascular: Negative.    Gastrointestinal: Negative.    Endocrine: Negative.    Genitourinary: Negative.    Musculoskeletal: Negative.    Skin: Negative.    Allergic/Immunologic: Negative.    Neurological:  Positive for light-headedness.   Hematological: Negative.    Psychiatric/Behavioral: Negative.     All other systems reviewed and are negative.      Personal History     Past Medical History:   Diagnosis Date    Acquired spondylolisthesis of lumbosacral region     Acute kidney injury 07/22/2022    Anxiety     Arthritis     Atrial fibrillation     paroxysmal    Broken shoulder     left-- due to fall 11-7-19 was at [facility]    Chronic pain disorder     Chronic systolic CHF (congestive heart failure) 01/05/2023    EF 36-40%    Coronary artery disease involving native coronary artery of native heart without angina pectoris 04/02/2021    nonobstructive    DDD (degenerative disc disease), cervical     DDD (degenerative disc disease), lumbar     Depression     Edema     9/2020 foot    GERD (gastroesophageal reflux disease)     H/O fall     Heart failure with mid-range ejection fraction 03/05/2022    EF 45% per 2D echo    Hypertension     Hypothyroidism     Insomnia     Low back pain     Moderate mitral regurgitation 01/05/2023    Neuropathy     Nonrheumatic tricuspid valve regurgitation     Pain in both feet     Polypharmacy     PONV (postoperative nausea and vomiting)     Pulmonary hypertension     Radiculopathy     Restless legs     Sacroiliitis     Sick sinus syndrome     Added automatically from request for surgery [id number]    Spondylolisthesis     Stage 3a chronic kidney disease     Urinary incontinence     Vitamin D deficiency        Past Surgical History:   Procedure Laterality Date    BACK SURGERY  07/19/2018    PDC &  PSF L3-L5 insitu    CARDIAC CATHETERIZATION N/A 4/2/2021    Procedure: Cardiac Catheterization/Vascular  Study;  Surgeon: Barrett Evans MD;  Location: [facility] CATH INVASIVE LOCATION;  Service: Cardiovascular;  Laterality: N/A;    CARDIAC ELECTROPHYSIOLOGY PROCEDURE N/A 1/17/2023    Procedure: Pacemaker DC new;  Surgeon: Baljeet Valero MD;  Location: [facility] CATH INVASIVE LOCATION;  Service: Cardiovascular;  Laterality: N/A;    CHOLECYSTECTOMY      COLONOSCOPY      COLONOSCOPY N/A 7/10/2024    Procedure: COLONOSCOPY with cold snare polypectomy x1 and endoscopic clipping x2;  Surgeon: MARLIN Vanegas MD;  Location: [facility] ENDOSCOPY;  Service: Gastroenterology;  Laterality: N/A;  Post- colon polyps, diverticulosis    ENDOSCOPY N/A 9/14/2023    Procedure: ESOPHAGOGASTRODUODENOSCOPY;  Surgeon: Ulysses Lamas MD;  Location: [facility] ENDOSCOPY;  Service: Gastroenterology;  Laterality: N/A;  gastritis, inflammatory gastric polyp    ENDOSCOPY N/A 7/10/2024    Procedure: ESOPHAGOGASTRODUODENOSCOPY;  Surgeon: MARLIN Vanegas MD;  Location: [facility] ENDOSCOPY;  Service: Gastroenterology;  Laterality: N/A;  Post- hiatal hernia, gastric polyps, gastritis    HYSTERECTOMY      ROTATOR CUFF REPAIR Left        Family History: family history includes Cancer in her father, paternal aunt, and paternal uncle; Diabetes in her son; Heart disease in her paternal aunt. Otherwise pertinent FHx was reviewed and not pertinent to current issue.    Social History:  reports that she has never smoked. She has never been exposed to tobacco smoke. She has never used smokeless tobacco. She reports that she does not drink alcohol and does not use drugs.    Home Medications:  Prior to Admission Medications       Prescriptions Last Dose Informant Patient Reported? Taking?    apixaban (ELIQUIS) 5 MG tablet tablet 9/30/2024  No Yes    Take 1 tablet by mouth Every 12 (Twelve) Hours. Indications: Atrial Fibrillation    cyclobenzaprine (FLEXERIL) 5 MG tablet   Yes No    Take 1 tablet by mouth 2 (Two) Times a Day As Needed for Muscle  Spasms.    dilTIAZem (CARDIZEM) 60 MG tablet   No No    Take 1 tablet by mouth 2 (Two) Times a Day.    ferrous gluconate (FERGON) 324 MG tablet   No No    Take 1 tablet by mouth Every Other Day.    levothyroxine (SYNTHROID, LEVOTHROID) 50 MCG tablet   No No    Take 1 tablet by mouth Daily.    metoprolol succinate XL (TOPROL-XL) 50 MG 24 hr tablet   No No    Take 1 tablet by mouth 2 (Two) Times a Day. For Afib/Hypertension    spironolactone (Aldactone) 25 MG tablet   No No    Take 1 tablet by mouth Daily. For swelling/CHF              Allergies:  Allergies   Allergen Reactions    Baclofen Rash    Codeine Nausea Only    Ibuprofen Unknown (See Comments)     Patient doesn't know----Motin    Methocarbamol Unknown (See Comments)     Patient doesn't know    Naproxen Unknown (See Comments)     Pt. Doesn't know     Tizanidine Hcl Other (See Comments)     Syncope     Tolmetin Dizziness     Pt. Doesn't know-- same as Tolectin       Objective      Vitals:   Temp:  [97.5 °F (36.4 °C)] 97.5 °F (36.4 °C)  Heart Rate:  [91-99] 99  Resp:  [13-18] 17  BP: (103-144)/(52-87) 120/87  Body mass index is 26.78 kg/m².  Physical Exam  Vitals reviewed.   Constitutional:       Appearance: Normal appearance. She is obese.   HENT:      Head: Normocephalic and atraumatic.      Right Ear: External ear normal.      Left Ear: External ear normal.      Nose: Nose normal.      Mouth/Throat:      Mouth: Mucous membranes are moist.   Eyes:      Extraocular Movements: Extraocular movements intact.   Cardiovascular:      Rate and Rhythm: Normal rate. Rhythm irregular.      Pulses: Normal pulses.      Heart sounds: Normal heart sounds.   Pulmonary:      Effort: Pulmonary effort is normal.      Breath sounds: Normal breath sounds.   Abdominal:      Palpations: Abdomen is soft.   Genitourinary:     Comments: deferred  Musculoskeletal:         General: Normal range of motion.      Cervical back: Normal range of motion and neck supple.   Skin:     General:  Skin is warm and dry.   Neurological:      General: No focal deficit present.      Mental Status: She is alert and oriented to person, place, and time.   Psychiatric:         Mood and Affect: Mood normal.         Behavior: Behavior normal.         Thought Content: Thought content normal.         Judgment: Judgment normal.         Diagnostic Data:  Lab Results (last 24 hours)       Procedure Component Value Units Date/Time    High Sensitivity Troponin T 2Hr [[id number]]  (Normal) Collected: 09/30/24 1724    Specimen: Blood Updated: 09/30/24 1754     HS Troponin T 7 ng/L      Troponin T Delta -2 ng/L     Narrative:      High Sensitive Troponin T Reference Range:  <14.0 ng/L- Negative Female for AMI  <22.0 ng/L- Negative Male for AMI  >=14 - Abnormal Female indicating possible myocardial injury.  >=22 - Abnormal Male indicating possible myocardial injury.   Clinicians would have to utilize clinical acumen, EKG, Troponin, and serial changes to determine if it is an Acute Myocardial Infarction or myocardial injury due to an underlying chronic condition.         Comprehensive Metabolic Panel [[id number]] Collected: 09/30/24 1513    Specimen: Blood from Arm, Right Updated: 09/30/24 1538     Glucose 98 mg/dL      BUN 18 mg/dL      Creatinine 0.93 mg/dL      Sodium 141 mmol/L      Potassium 4.0 mmol/L      Chloride 106 mmol/L      CO2 24.5 mmol/L      Calcium 8.9 mg/dL      Total Protein 6.7 g/dL      Albumin 4.2 g/dL      ALT (SGPT) <5 U/L      AST (SGOT) 15 U/L      Alkaline Phosphatase 63 U/L      Total Bilirubin 1.2 mg/dL      Globulin 2.5 gm/dL      A/G Ratio 1.7 g/dL      BUN/Creatinine Ratio 19.4     Anion Gap 10.5 mmol/L      eGFR 61.1 mL/min/1.73     Narrative:      GFR Normal >60  Chronic Kidney Disease <60  Kidney Failure <15    The GFR formula is only valid for adults with stable renal function between ages 18 and 70.    High Sensitivity Troponin T [[id number]]  (Normal) Collected: 09/30/24 1513    Specimen: Blood  from Arm, Right Updated: 09/30/24 1538     HS Troponin T 9 ng/L     Narrative:      High Sensitive Troponin T Reference Range:  <14.0 ng/L- Negative Female for AMI  <22.0 ng/L- Negative Male for AMI  >=14 - Abnormal Female indicating possible myocardial injury.  >=22 - Abnormal Male indicating possible myocardial injury.   Clinicians would have to utilize clinical acumen, EKG, Troponin, and serial changes to determine if it is an Acute Myocardial Infarction or myocardial injury due to an underlying chronic condition.         Magnesium [[id number]]  (Normal) Collected: 09/30/24 1513    Specimen: Blood from Arm, Right Updated: 09/30/24 1538     Magnesium 2.1 mg/dL     aPTT [[id number]]  (Abnormal) Collected: 09/30/24 1453    Specimen: Blood Updated: 09/30/24 1533     PTT 29.7 seconds     Protime-INR [[id number]]  (Normal) Collected: 09/30/24 1453    Specimen: Blood Updated: 09/30/24 1533     Protime 11.4 Seconds      INR 1.05    CBC & Differential [[id number]]  (Abnormal) Collected: 09/30/24 1453    Specimen: Blood Updated: 09/30/24 1502    Narrative:      The following orders were created for panel order CBC & Differential.  Procedure                               Abnormality         Status                     ---------                               -----------         ------                     CBC Auto Differential[[id number]]        Abnormal            Final result                 Please view results for these tests on the individual orders.    CBC Auto Differential [[id number]]  (Abnormal) Collected: 09/30/24 1453    Specimen: Blood Updated: 09/30/24 1502     WBC 6.27 10*3/mm3      RBC 4.77 10*6/mm3      Hemoglobin 13.9 g/dL      Hematocrit 43.7 %      MCV 91.6 fL      MCH 29.1 pg      MCHC 31.8 g/dL      RDW 18.0 %      RDW-SD 61.6 fl      MPV 10.3 fL      Platelets 267 10*3/mm3      Neutrophil % 64.7 %      Lymphocyte % 25.8 %      Monocyte % 7.2 %      Eosinophil % 1.3 %      Basophil % 0.8 %      Immature Grans %  0.2 %      Neutrophils, Absolute 4.06 10*3/mm3      Lymphocytes, Absolute 1.62 10*3/mm3      Monocytes, Absolute 0.45 10*3/mm3      Eosinophils, Absolute 0.08 10*3/mm3      Basophils, Absolute 0.05 10*3/mm3      Immature Grans, Absolute 0.01 10*3/mm3      nRBC 0.0 /100 WBC     [place] Draw [[id number]] Collected: 09/30/24 1453    Specimen: Blood Updated: 09/30/24 1500    Narrative:      The following orders were created for panel order [place] Draw.  Procedure                               Abnormality         Status                     ---------                               -----------         ------                     Green Top (Gel)[[id number]]                                  Final result               Lavender Top[[id number]]                                     Final result               Gold Top - SST[[id number]]                                   Final result               Light Blue Top[[id number]]                                   Final result                 Please view results for these tests on the individual orders.    Green Top (Gel) [[id number]] Collected: 09/30/24 1453    Specimen: Blood Updated: 09/30/24 1500     Extra Tube Hold for add-ons.     Comment: Auto resulted.       Lavender Top [[id number]] Collected: 09/30/24 1453    Specimen: Blood Updated: 09/30/24 1500     Extra Tube hold for add-on     Comment: Auto resulted       Gold Top - SST [[id number]] Collected: 09/30/24 1453    Specimen: Blood Updated: 09/30/24 1500     Extra Tube Hold for add-ons.     Comment: Auto resulted.       Light Blue Top [[id number]] Collected: 09/30/24 1453    Specimen: Blood Updated: 09/30/24 1500     Extra Tube Hold for add-ons.     Comment: Auto resulted                Imaging Results (Last 24 Hours)       Procedure Component Value Units Date/Time    XR Chest 1 View [[id number]] Collected: 09/30/24 1503     Updated: 09/30/24 1512    Narrative:      XR CHEST 1 VW    Date of Exam: 9/30/2024 2:59 PM EDT    Indication: Chest  Pain Triage Protocol    Comparison: 7/24/2024    Findings:  Patient rotated to the right. Left-sided AICD noted. The lungs are without consolidation. Negative for pneumothorax. No pleural effusion. Osseous structures appear intact.      Impression:      Impression:  No acute process.        Electronically Signed: Edil Joshua MD    9/30/2024 3:10 PM EDT    Workstation ID: [id number]              Assessment & Plan        This is a 84 y.o. female with:    Active and Resolved Problems  Active Hospital Problems    Diagnosis  POA    **Atrial fibrillation with RVR [I48.91]  Yes     Priority: High    Chronic HFrEF (heart failure with reduced ejection fraction) [I50.22]  Yes    Restless legs syndrome [G25.81]  Yes    Generalized anxiety disorder [F41.1]  Yes    Gastro-esophageal reflux disease without esophagitis [K21.9]  Yes    Stage 3a chronic kidney disease [N18.31]  Yes    Hypothyroidism [E03.9]  Yes    Depressive disorder [F32.A]  Yes      Resolved Hospital Problems   No resolved problems to display.     Of note home medications were verified by sidebar summary from prescription    Atrial fibrillation with RVR, troponin negative, now stable rate on Cardizem drip, hold home p.o. Cardizem for now, reordered home metoprolol and apixaban continuous cardiac monitoring cardiology consulted    Chronic heart failure reduced ejection fraction, stable on room air on home spironolactone cardiology consulted    Restless leg syndrome, on home Flexeril as needed    Generalized anxiety disorder, no current home meds    GERD, not on PPI no complaints    Stage IIIa chronic kidney disease stable    Hypothyroidism, reordered home levothyroxine TSH ordered and pending    Depressive disorder, no current home meds stable      VTE Prophylaxis:  No VTE prophylaxis order currently exists.        The patient desires to be as follows:    CODE STATUS:    Code Status (Patient has no pulse and is not breathing): CPR (Attempt to Resuscitate)  Medical  Interventions (Patient has pulse or is breathing): Full Support            Admission Status:  I believe this patient meets observation status.    Expected Length of Stay: pending cardiology evaluation and clinical course     PDMP and Medication Dispenses via Sidebar reviewed and consistent with patient reported medications.    I discussed the patient's findings and my recommendations with patient.      Signature:     This document has been electronically signed by KALYANI Guerrero on September 30, 2024 19:26 EDT   [facility]    Electronically signed by Llanes Alvarez, Carlos, MD at 10/01/24 0141

## 2024-10-01 NOTE — PLAN OF CARE
"Goal Outcome Evaluation:  Plan of Care Reviewed With: patient  Pt presents as an 83 y/o F admitted to [facility] on 9/30/24 with shortness on air. Pt being treated for atrial fibrillation with RVR. Pt received cardizem for a-fib. CMH of paroxysmal atrial fibrillation on Eliquis, hypothyroidism, hypertension, anxiety and depression, arthritis, GERD, heart failure with reduced ejection fraction EF 46 to 50% per echo dated 7/6/2024. Pt A and O x 4, pleasant and cooperative and eager to mobilize. At baseline, pt lives alone and uses a cane for independence with household mobility and she uses a rolling walker on her \"bad days.\" This date, pt performs bed mobility with SBA of 1 and transfers with CGA of 1. Pt ambulated 150 feet with rolling walker with CGA of 1. Per chart review, pt has had difficulty at home with medicine management. PT recommends that pt have an Occupational Therapy Evaluation. PT recommendation at this time is Home with Home Health Therapy and transition to prison.                 Anticipated Discharge Disposition (PT): home with assist, home with home health, assisted living                        "

## 2024-10-01 NOTE — THERAPY EVALUATION
Patient Name: Catalina Moreno  : 1940    MRN: [id number]                              Today's Date: 10/1/2024       Admit Date: 2024    Visit Dx:     ICD-10-CM ICD-9-CM   1. Atrial fibrillation with RVR  I48.91 427.31   2. Chest pain, unspecified type  R07.9 786.50     Patient Active Problem List   Diagnosis    Arthritis    Depressive disorder    Primary fibromyalgia syndrome    Hypothyroidism    Atrial fibrillation with RVR    Stage 3a chronic kidney disease    Chronic low back pain    Age-related cognitive decline    Generalized anxiety disorder    Polyneuropathy, unspecified    Restless legs syndrome    Paroxysmal atrial fibrillation    Essential (primary) hypertension    Gastro-esophageal reflux disease without esophagitis    Pulmonary hypertension, unspecified    Coronary artery disease involving native coronary artery of native heart without angina pectoris    Presence of cardiac pacemaker    Chronic HFrEF (heart failure with reduced ejection fraction)    Atrial flutter with rapid ventricular response    Weakness    AF (atrial fibrillation)    Chronic heart failure with preserved ejection fraction (HFpEF)    Fall    Nasal bones, closed fracture    Iron deficiency anemia    Atrial fibrillation     Past Medical History:   Diagnosis Date    Acquired spondylolisthesis of lumbosacral region     Acute kidney injury 2022    Anxiety     Arthritis     Atrial fibrillation     paroxysmal    Broken shoulder     left-- due to fall 20 was at [facility]    Chronic pain disorder     Chronic systolic CHF (congestive heart failure) 2023    EF 36-40%    Coronary artery disease involving native coronary artery of native heart without angina pectoris 2021    nonobstructive    DDD (degenerative disc disease), cervical     DDD (degenerative disc disease), lumbar     Depression     Edema     2020 foot    GERD (gastroesophageal reflux disease)     H/O fall     Heart failure with mid-range ejection  fraction 03/05/2022    EF 45% per 2D echo    Hypertension     Hypothyroidism     Insomnia     Low back pain     Moderate mitral regurgitation 01/05/2023    Neuropathy     Nonrheumatic tricuspid valve regurgitation     Pain in both feet     Polypharmacy     PONV (postoperative nausea and vomiting)     Pulmonary hypertension     Radiculopathy     Restless legs     Sacroiliitis     Sick sinus syndrome     Added automatically from request for surgery [id number]    Spondylolisthesis     Stage 3a chronic kidney disease     Urinary incontinence     Vitamin D deficiency      Past Surgical History:   Procedure Laterality Date    BACK SURGERY  07/19/2018    PDC &  PSF L3-L5 insitu    CARDIAC CATHETERIZATION N/A 4/2/2021    Procedure: Cardiac Catheterization/Vascular Study;  Surgeon: Barrett Evans MD;  Location: [facility] CATH INVASIVE LOCATION;  Service: Cardiovascular;  Laterality: N/A;    CARDIAC ELECTROPHYSIOLOGY PROCEDURE N/A 1/17/2023    Procedure: Pacemaker DC new;  Surgeon: Baljeet Valero MD;  Location: [facility] CATH INVASIVE LOCATION;  Service: Cardiovascular;  Laterality: N/A;    CHOLECYSTECTOMY      COLONOSCOPY      COLONOSCOPY N/A 7/10/2024    Procedure: COLONOSCOPY with cold snare polypectomy x1 and endoscopic clipping x2;  Surgeon: MARLIN Vanegas MD;  Location: [facility] ENDOSCOPY;  Service: Gastroenterology;  Laterality: N/A;  Post- colon polyps, diverticulosis    ENDOSCOPY N/A 9/14/2023    Procedure: ESOPHAGOGASTRODUODENOSCOPY;  Surgeon: Ulysses Lamas MD;  Location: [facility] ENDOSCOPY;  Service: Gastroenterology;  Laterality: N/A;  gastritis, inflammatory gastric polyp    ENDOSCOPY N/A 7/10/2024    Procedure: ESOPHAGOGASTRODUODENOSCOPY;  Surgeon: MARLIN Vanegas MD;  Location: [facility] ENDOSCOPY;  Service: Gastroenterology;  Laterality: N/A;  Post- hiatal hernia, gastric polyps, gastritis    HYSTERECTOMY      ROTATOR CUFF REPAIR Left       General Information       Row Name 10/01/24 8879     "      Physical Therapy Time and Intention    Document Type evaluation  -BR     Mode of Treatment physical therapy  -BR       Row Name 10/01/24 1618          General Information    Patient Profile Reviewed yes  -BR     Prior Level of Function independent:;all household mobility;community mobility;gait;transfer  Pt reports she uses a cane mostly and uses a rolling walker on her \"bad days.\"  -BR     Existing Precautions/Restrictions fall;cardiac  -BR       Row Name 10/01/24 1618          Living Environment    People in Home alone  -BR       Row Name 10/01/24 1618          Home Main Entrance    Number of Stairs, Main Entrance none  -BR       Row Name 10/01/24 1618          Stairs Within Home, Primary    Number of Stairs, Within Home, Primary none  -BR       Row Name 10/01/24 1618          Cognition    Orientation Status (Cognition) oriented x 4  -BR       Row Name 10/01/24 1618          Safety Issues, Functional Mobility    Impairments Affecting Function (Mobility) endurance/activity tolerance;balance;strength  -BR               User Key  (r) = Recorded By, (t) = Taken By, (c) = Cosigned By      Initials Name Provider Type    BR Catalina Adams, PT Physical Therapist                   Mobility       Row Name 10/01/24 1619          Bed Mobility    Bed Mobility bed mobility (all) activities  -BR     All Activities, [place] (Bed Mobility) standby assist  -BR       Row Name 10/01/24 1619          Sit-Stand Transfer    Sit-Stand [place] (Transfers) standby assist  -BR       Row Name 10/01/24 1619          Gait/Stairs (Locomotion)    [place] Level (Gait) contact guard;1 person assist  -BR     Assistive Device (Gait) walker, front-wheeled  -BR     Patient was able to Ambulate yes  -BR     Distance in Feet (Gait) 150  -BR     Bilateral Gait Deviations forward flexed posture;heel strike decreased  -BR               User Key  (r) = Recorded By, (t) = Taken By, (c) = Cosigned By      Initials Name Provider Type    " Catalina Chaves, PT Physical Therapist                   Obj/Interventions       Row Name 10/01/24 1620          Range of Motion Comprehensive    General Range of Motion no range of motion deficits identified  -BR       Row Name 10/01/24 1620          Strength Comprehensive (MMT)    Comment, General Manual Muscle Testing (MMT) Assessment BLE strength grossly 3+/5  -BR       Row Name 10/01/24 1620          Balance    Balance Assessment sitting static balance;sitting dynamic balance;standing static balance;standing dynamic balance  -BR     Static Sitting Balance modified independence  -BR     Dynamic Sitting Balance modified independence  -BR     Position, Sitting Balance unsupported;sitting edge of bed  -BR     Static Standing Balance contact guard  -BR     Dynamic Standing Balance minimal assist  -BR     Position/Device Used, Standing Balance supported;walker, rolling  -BR       Row Name 10/01/24 1620          Sensory Assessment (Somatosensory)    Sensory Assessment (Somatosensory) sensation intact  -BR               User Key  (r) = Recorded By, (t) = Taken By, (c) = Cosigned By      Initials Name Provider Type    Catalina Chaves, PT Physical Therapist                   Goals/Plan       Row Name 10/01/24 1629          Bed Mobility Goal 1 (PT)    Activity/Assistive Device (Bed Mobility Goal 1, PT) bed mobility activities, all  -BR     [place] Level/Cues Needed (Bed Mobility Goal 1, PT) independent  -BR     Time Frame (Bed Mobility Goal 1, PT) long term goal (LTG);2 weeks  -BR       Row Name 10/01/24 1629          Transfer Goal 1 (PT)    Activity/Assistive Device (Transfer Goal 1, PT) transfers, all  -BR     [place] Level/Cues Needed (Transfer Goal 1, PT) independent  -BR     Time Frame (Transfer Goal 1, PT) long term goal (LTG);2 weeks  -BR       Row Name 10/01/24 1629          Gait Training Goal 1 (PT)    Activity/Assistive Device (Gait Training Goal 1, PT) gait (walking locomotion);assistive  "device use  -BR     [place] Level (Gait Training Goal 1, PT) modified independence  -BR     Distance (Gait Training Goal 1, PT) 250  -BR     Time Frame (Gait Training Goal 1, PT) long term goal (LTG);2 weeks  -BR       Row Name 10/01/24 1629          Therapy Assessment/Plan (PT)    Planned Therapy Interventions (PT) balance training;gait training;transfer training;strengthening;patient/family education  -BR               User Key  (r) = Recorded By, (t) = Taken By, (c) = Cosigned By      Initials Name Provider Type    BR Catalina Adams, PT Physical Therapist                   Clinical Impression       Row Name 10/01/24 1621          Pain    Pretreatment Pain Rating 0/10 - no pain  -BR     Posttreatment Pain Rating 0/10 - no pain  -BR       Row Name 10/01/24 1628 10/01/24 1621       Plan of Care Review    Plan of Care Reviewed With -- patient  -BR    Outcome Evaluation Pt presents as an 85 y/o F admitted to [facility] on 9/30/24 with shortness on air. Pt being treated for atrial fibrillation with RVR. Pt received cardizem for a-fib. CMH of paroxysmal atrial fibrillation on Eliquis, hypothyroidism, hypertension, anxiety and depression, arthritis, GERD, heart failure with reduced ejection fraction EF 46 to 50% per echo dated 7/6/2024. Pt A and O x 4, pleasant and cooperative and eager to mobilize. At baseline, pt lives alone and uses a cane for independence with household mobility and she uses a rolling walker on her \"bad days.\" This date, pt performs bed mobility with SBA of 1 and transfers with CGA of 1. Pt ambulated 150 feet with rolling walker with CGA of 1. Per chart review, pt has had difficulty at home with medicine management. PT recommends that pt have an Occupational Therapy Evaluation. PT recommendation at this time is Home with Home Health Therapy and transition to [facility].  -BR --      Row Name 10/01/24 1621          Therapy Assessment/Plan (PT)    Rehab Potential (PT) good, to achieve stated therapy goals  " -BR     Criteria for Skilled Interventions Met (PT) yes;meets criteria;skilled treatment is necessary  -BR     Therapy Frequency (PT) 5 times/wk  -BR     Predicted Duration of Therapy Intervention (PT) until D/C  -BR       Row Name 10/01/24 1621          Vital Signs    Pre Systolic BP Rehab 123  -BR     Pre Treatment Diastolic BP 61  -BR     Pretreatment Heart Rate (beats/min) 85  -BR     Intratreatment Heart Rate (beats/min) 108  -BR     Posttreatment Heart Rate (beats/min) 92  -BR     Pretreatment Resp Rate (breaths/min) 12  -BR     Pre SpO2 (%) 97  -BR     O2 Delivery Pre Treatment room air  -BR     Post SpO2 (%) 98  -BR     O2 Delivery Post Treatment room air  -BR     Pre Patient Position Supine  -BR     Intra Patient Position Standing  -BR     Post Patient Position Sitting  -BR       Row Name 10/01/24 1621          Positioning and Restraints    Pre-Treatment Position in bed  -BR     Post Treatment Position chair  -BR     In Chair notified nsg;reclined;call light within reach;encouraged to call for assist;exit alarm on;legs elevated  -BR               User Key  (r) = Recorded By, (t) = Taken By, (c) = Cosigned By      Initials Name Provider Type    Catalina Chaves, PT Physical Therapist                   Outcome Measures       Row Name 10/01/24 1629 10/01/24 0800       How much help from another person do you currently need...    Turning from your back to your side while in flat bed without using bedrails? 4  -BR 3  -JE    Moving from lying on back to sitting on the side of a flat bed without bedrails? 4  -BR 4  -JE    Moving to and from a bed to a chair (including a wheelchair)? 3  -BR 4  -JE    Standing up from a chair using your arms (e.g., wheelchair, bedside chair)? 4  -BR 4  -JE    Climbing 3-5 steps with a railing? 3  -BR 3  -JE    To walk in hospital room? 3  -BR 3  -JE    AM-PAC 6 Clicks Score (PT) 21  -BR 21  -JE    Highest Level of Mobility Goal 6 --> Walk 10 steps or more  -BR 6 --> Walk 10  "steps or more  -ABDULAZIZ      Row Name 10/01/24 1629          Functional Assessment    Outcome Measure Options AM-PAC 6 Clicks Basic Mobility (PT)  -BR               User Key  (r) = Recorded By, (t) = Taken By, (c) = Cosigned By      Initials Name Provider Type    Savanah Arroyo, RN Registered Nurse    Catalina Chaves, PT Physical Therapist                                 Physical Therapy Education       Title: PT OT SLP Therapies (Done)       Topic: Physical Therapy (Done)       Point: Mobility training (Done)       Learning Progress Summary             Patient Acceptance, E,D, VU,DU by BR at 10/1/2024 1630                         Point: Body mechanics (Done)       Learning Progress Summary             Patient Acceptance, E,D, VU,DU by BR at 10/1/2024 1630                         Point: Precautions (Done)       Learning Progress Summary             Patient Acceptance, E,D, VU,DU by BR at 10/1/2024 1630                                         User Key       Initials Effective Dates Name Provider Type Discipline     02/01/22 -  Catalina Adams PT Physical Therapist PT                  PT Recommendation and Plan  Planned Therapy Interventions (PT): balance training, gait training, transfer training, strengthening, patient/family education  Plan of Care Reviewed With: patient  Outcome Evaluation: Pt presents as an 83 y/o F admitted to [facility] on 9/30/24 with shortness on air. Pt being treated for atrial fibrillation with RVR. Pt received cardizem for a-fib. CMH of paroxysmal atrial fibrillation on Eliquis, hypothyroidism, hypertension, anxiety and depression, arthritis, GERD, heart failure with reduced ejection fraction EF 46 to 50% per echo dated 7/6/2024. Pt A and O x 4, pleasant and cooperative and eager to mobilize. At baseline, pt lives alone and uses a cane for independence with household mobility and she uses a rolling walker on her \"bad days.\" This date, pt performs bed mobility with SBA of 1 and " transfers with CGA of 1. Pt ambulated 150 feet with rolling walker with CGA of 1. Per chart review, pt has had difficulty at home with medicine management. PT recommends that pt have an Occupational Therapy Evaluation. PT recommendation at this time is Home with Home Health Therapy and transition to [facility].     Time Calculation:         PT Charges       Row Name 10/01/24 1630             Time Calculation    Start Time 1524  -BR      Stop Time 1551  -BR      Time Calculation (min) 27 min  -BR      PT Received On 10/01/24  -BR      PT - Next Appointment 10/02/24  -BR      PT Goal Re-Cert Due Date 10/15/24  -BR         Time Calculation- PT    Total Timed Code Minutes- PT 0 minute(s)  -BR                User Key  (r) = Recorded By, (t) = Taken By, (c) = Cosigned By      Initials Name Provider Type    Catalina Chaves, PT Physical Therapist                  Therapy Charges for Today       Code Description Service Date Service Provider Modifiers Qty    [id number] HC PT EVAL LOW COMPLEXITY 4 10/1/2024 Catalina Adams, PT GP 1            PT G-Codes  Outcome Measure Options: AM-PAC 6 Clicks Basic Mobility (PT)  AM-PAC 6 Clicks Score (PT): 21  PT Discharge Summary  Anticipated Discharge Disposition (PT): home with assist, home with home health, assisted living    Catalina Adams, DEBBI  10/1/2024

## 2024-10-01 NOTE — ED NOTES
Nursing report ED to floor  Catalina Moreno  84 y.o.  female    HPI:   Chief Complaint   Patient presents with    Irregular Heart Beat       Admitting doctor:   Monalisa Lopes MD    Admitting diagnosis:   The primary encounter diagnosis was Atrial fibrillation with RVR. A diagnosis of Chest pain, unspecified type was also pertinent to this visit.    Code status:   Current Code Status       Date Active Code Status Order ID Comments User Context       9/30/2024 1921 CPR (Attempt to Resuscitate) [id number]  Melania Jo APRN ED        Question Answer    Code Status (Patient has no pulse and is not breathing) CPR (Attempt to Resuscitate)    Medical Interventions (Patient has pulse or is breathing) Full Support                    Allergies:   Baclofen, Codeine, Ibuprofen, Methocarbamol, Naproxen, Tizanidine hcl, and Tolmetin    Isolation:  No active isolations     Fall Risk:  Fall Risk Assessment was completed, and patient is at high risk for falls.   Predictive Model Details         13 (Low) Factor Value    Calculated 9/30/2024 21:27 Age 84    Risk of Fall Model Active Peripheral IV Present     Imaging order in this encounter Present     Magnesium 2.1 mg/dL     Diastolic BP 54     Rick Scale not on file     Total Bilirubin 1.2 mg/dL     Number of Distinct Medication Classes administered 2     Calcium 8.9 mg/dL     Cardiac Assessment X     ALT <5 U/L     Respiratory Rate 16     Days after Admission 0.292     Creatinine 0.93 mg/dL     Albumin 4.2 g/dL     Chloride 106 mmol/L     Potassium 4 mmol/L         Weight:       09/30/24  1440   Weight: 70.8 kg (156 lb)       Intake and Output  No intake or output data in the 24 hours ending 09/30/24 2130    Diet:   Dietary Orders (From admission, onward)       Start     Ordered    09/30/24 1925  Diet: Cardiac; Healthy Heart (2-3 Na+); Fluid Consistency: Thin (IDDSI 0)  Diet Effective Now        References:    Diet Order [facility]   Question Answer Comment   Diets: Cardiac     Cardiac Diet: Healthy Heart (2-3 Na+)    Fluid Consistency: Thin (IDDSI 0)        09/30/24 1924                     Most recent vitals:   Vitals:    09/30/24 1732 09/30/24 1835 09/30/24 1900 09/30/24 2028   BP: 144/70 120/87 92/72 96/54   BP Location: Left arm Left arm     Patient Position: Lying Lying     Pulse: 91 99 104 74   Resp: 13 17 16    Temp:       SpO2:       Weight:       Height:           Active LDAs/IV Access:   Lines, Drains & Airways       Active LDAs       Name Placement date Placement time Site Days    Peripheral IV 09/30/24 1454 Right Antecubital 09/30/24 1454  Antecubital  less than 1                    Skin Condition:   Skin Assessments (last day)       None             Labs (abnormal labs have a star):   Labs Reviewed   CBC WITH AUTO DIFFERENTIAL - Abnormal; Notable for the following components:       Result Value    RDW 18.0 (*)     RDW-SD 61.6 (*)     All other components within normal limits   APTT - Abnormal; Notable for the following components:    PTT 29.7 (*)     All other components within normal limits   TROPONIN - Normal    Narrative:     High Sensitive Troponin T Reference Range:  <14.0 ng/L- Negative Female for AMI  <22.0 ng/L- Negative Male for AMI  >=14 - Abnormal Female indicating possible myocardial injury.  >=22 - Abnormal Male indicating possible myocardial injury.   Clinicians would have to utilize clinical acumen, EKG, Troponin, and serial changes to determine if it is an Acute Myocardial Infarction or myocardial injury due to an underlying chronic condition.        MAGNESIUM - Normal   PROTIME-INR - Normal   HIGH SENSITIVITIY TROPONIN T 2HR - Normal    Narrative:     High Sensitive Troponin T Reference Range:  <14.0 ng/L- Negative Female for AMI  <22.0 ng/L- Negative Male for AMI  >=14 - Abnormal Female indicating possible myocardial injury.  >=22 - Abnormal Male indicating possible myocardial injury.   Clinicians would have to utilize clinical acumen, EKG, Troponin, and  serial changes to determine if it is an Acute Myocardial Infarction or myocardial injury due to an underlying chronic condition.        BNP (IN-HOUSE) - Normal    Narrative:     This assay is used as an aid in the diagnosis of individuals suspected of having heart failure. It can be used as an aid in the diagnosis of acute decompensated heart failure (ADHF) in patients presenting with signs and symptoms of ADHF to the emergency department (ED). In addition, NT-proBNP of <300 pg/mL indicates ADHF is not likely.    Age Range Result Interpretation  NT-proBNP Concentration (pg/mL:      <50             Positive            >450                   Gray                 300-450                    Negative             <300    50-75           Positive            >900                  Gray                300-900                  Negative            <300      >75             Positive            >1800                  Gray                300-1800                  Negative            <300   RAINBOW DRAW    Narrative:     The following orders were created for panel order Roxana Draw.  Procedure                               Abnormality         Status                     ---------                               -----------         ------                     Green Top (Gel)[[id number]]                                  Final result               Lavender Top[[id number]]                                     Final result               Gold Top - SST[[id number]]                                   Final result               Light Blue Top[[id number]]                                   Final result                 Please view results for these tests on the individual orders.   COMPREHENSIVE METABOLIC PANEL    Narrative:     GFR Normal >60  Chronic Kidney Disease <60  Kidney Failure <15    The GFR formula is only valid for adults with stable renal function between ages 18 and 70.   CBC AND DIFFERENTIAL    Narrative:     The following orders were  created for panel order CBC & Differential.  Procedure                               Abnormality         Status                     ---------                               -----------         ------                     CBC Auto Differential[[id number]]        Abnormal            Final result                 Please view results for these tests on the individual orders.   GREEN TOP   LAVENDER TOP   GOLD TOP - SST   LIGHT BLUE TOP       LOC: Person, Place, Time, and Situation    Telemetry:  Med/Surg    Cardiac Monitoring Ordered: yes    EKG:   ECG 12 Lead ED Triage Standing Order; Chest Pain   Preliminary Result   HEART RATE=85  bpm   RR Ahskeuon=281  ms   NM Interval=  ms   P Horizontal Axis=  deg   P Front Axis=  deg   QRSD Yqpzncjg=236  ms   QT Enkytqyh=634  ms   XHgT=212  ms   QRS Axis=-45  deg   T Wave Axis=126  deg   - ABNORMAL ECG -   Atrial fibrillation   Left bundle branch block   Date and Time of Study:2024-09-30 14:36:29          Medications Given in the ED:   Medications   sodium chloride 0.9 % flush 10 mL (has no administration in time range)   dilTIAZem (CARDIZEM) 125 mg in 125 mL D5W infusion (5 mg/hr Intravenous Currently Infusing 9/30/24 2028)   apixaban (ELIQUIS) tablet 5 mg (has no administration in time range)   dilTIAZem (CARDIZEM) tablet 60 mg ( Oral Dose Auto Held 10/8/24 2100)   ferrous sulfate EC tablet 324 mg (has no administration in time range)   levothyroxine (SYNTHROID, LEVOTHROID) tablet 50 mcg (has no administration in time range)   metoprolol succinate XL (TOPROL-XL) 24 hr tablet 50 mg (has no administration in time range)   spironolactone (ALDACTONE) tablet 25 mg (has no administration in time range)   cyclobenzaprine (FLEXERIL) tablet 5 mg (has no administration in time range)   aspirin tablet 325 mg (325 mg Oral Given 9/30/24 1507)       Imaging results:  XR Chest 1 View    Result Date: 9/30/2024  Impression: No acute process. Electronically Signed: Edil Joshua MD  9/30/2024 3:10 PM  EDT  Workstation ID: [id number]     Social issues:   Social History     Socioeconomic History    Marital status:    Tobacco Use    Smoking status: Never     Passive exposure: Never    Smokeless tobacco: Never   Vaping Use    Vaping status: Never Used   Substance and Sexual Activity    Alcohol use: No    Drug use: Never    Sexual activity: Defer       NIH Stroke Scale:  Interval: (not recorded)  1a. Level of Consciousness: (not recorded)  1b. LOC Questions: (not recorded)  1c. LOC Commands: (not recorded)  2. Best Gaze: (not recorded)  3. Visual: (not recorded)  4. Facial Palsy: (not recorded)  5a. Motor Arm, Left: (not recorded)  5b. Motor Arm, Right: (not recorded)  6a. Motor Leg, Left: (not recorded)  6b. Motor Leg, Right: (not recorded)  7. Limb Ataxia: (not recorded)  8. Sensory: (not recorded)  9. Best Language: (not recorded)  10. Dysarthria: (not recorded)  11. Extinction and Inattention (formerly Neglect): (not recorded)    Total (NIH Stroke Scale): (not recorded)     Additional notable assessment information:     Nursing report ED to floor:  Osmin Hutton RN   09/30/24 21:30 EDT

## 2024-10-01 NOTE — PLAN OF CARE
Goal Outcome Evaluation:              Outcome Evaluation: blood pressure was lower this am starting around 1000 after morning meds given po at around 1230 blood pressurelowerst at 78/58 patient with no dizziness or lightheadedness or and symptoms noted. see vital signs for others blood pressures. blood pressure now 102/67. up to bathroom with assist x1.

## 2024-10-01 NOTE — CASE MANAGEMENT/SOCIAL WORK
Social Work Assessment  [facility]     Patient Name: Catalina Moreno  MRN: [id number]  Today's Date: 10/1/2024    Admit Date: 9/30/2024     Discharge Plan       Row Name 10/01/24 1501       Plan    Plan Comments LSW informed that Ramon  reported that police were dispatched for a welfare check at request of PCP. Per chart review/[facility] staff,  with Ramon is working to facility DURGA admission. LSW met with patient at bedside today to discuss disposition. Patient reports not feeling well and affirms she would not have come to the hospital if the police weren’t dispatched. Patient is focused on her dogs needs at home and who can watch the dog. Patient declined knowing about anyone working on prison admission for her. Patient reports history of going to [place] for SNF from hospital. States she would like to go home at discharge. Encouraged that she may have other needs. Patient aware of PT eval pending and that we will follow-up on disposition.             FRANCE Jensen, LSW, [facility]    Phone: [phone]  Cell: [phone]  Fax: [phone]  [email or web address]

## 2024-10-01 NOTE — PLAN OF CARE
Goal Outcome Evaluation:  Plan of Care Reviewed With: patient              Pt arrived to PCU from the ED with Cardizem gtt infusing. Cardizem was stopped per order parameters due to low BP. Oral metoprolol administered and pt's HR has been in controlled afib tolerating without the Cardizem infusion well. Later on in the night the pt did have complaints of CP. EKG obtained, stat troponin was done, and a 1x dose of nitro was administered. !x dose of nitro was effective in eliminating the pain but she does still have some pressure. Repeat troponin negative at 12.

## 2024-10-01 NOTE — CONSULTS
Cardiology Consult-EP      REQUESTING PROVIDER  Gaston Lopez MD    PATIENT IDENTIFICATION  Name: Catalina Moreno  Age: 84 y.o.  Sex: female  :  1940  MRN: [id number]             REASON  FOR  CONSULTATION  84-year-old female patient of Dr. Gonzalez with cardiac history of paroxysmal atrial fibrillation with elevated chads vascular score anticoagulated with Eliquis, history of nonobstructive coronary artery disease per heart cath performed , history of chronic systolic heart failure with known LV systolic function and most recent EF 45% 2022, history of valvular heart disease including moderate MR, history of sick sinus syndrome status post dual-chamber permanent pacemaker implant 2023 for Dr. Valero.  Additional history of primary hypertension, chronic pain disorder, peripheral neuropathy, stage IIIa chronic kidney disease, polypharmacy.    CC:  Shortness of breath  Lightheadedness    HPI:  Patient presented to the emergency department at [facility] 2024 with symptoms as above over the past 2 days, progressively worsening.  She denies any actual chest pain, pressure or tightness.  She denies any lower extremity edema, near syncopal or syncopal episodes.  In the ED, telemetry and EKG confirmed atrial fibrillation with rapid ventricular response.  She was given IV diltiazem bolus and started on a drip with improvement in rate which has been discontinued.  Patient reports she has missed some doses of her metoprolol because she has not yet picked up the prescription.  Upon my evaluation, she is laying flat in bed on her left side asleep with normal respiratory effort.  Rhythm is intermittently paced with underlying atrial fibrillation.  Blood pressure 101/58.  She has been evaluated by her primary cardiologist.  EP was consulted for further rhythm strategies.    REVIEW OF SYSTEMS:  Pertinent items are noted in HPI, all other systems reviewed and  "negative    OBJECTIVE   High-sensitivity troponin negative x 3  proBNP within normal limits  TSH 13.9 (11.4 on 8/1/2024)  Free T41.2    ASSESSMENT  Shortness of breath/lightheadedness  Atrial fibrillation with rapid ventricular response  Abnormal thyroid study  History of paroxysmal atrial fibrillation  Elevated chads vascular score  Coagulopathy on Eliquis  History of sick sinus syndrome status post dual-chamber ICD  History of nonobstructive coronary artery disease  History of HFmrEF  Nonobstructive coronary artery disease  Valvular heart disease: Mod-severe MR/TR  Hypertension with hypertensive heart disease    RECOMMENDATIONS  IV Cardizem has been discontinued once rate was better controlled.  Still with intermittent A-fib.  Continue BB  Treat underlying thyroid disorder  Continue Eliquis for stroke prevention from A-fib  Receiving Aldactone 25 mg daily  No evidence of acute coronary syndrome with negative serial cardiac enzymes  Further recommendations per electrophysiologist          CHF Guideline Directed Medical Therapy  Beta Blocker:   ARNI/ACE/ARB:   SGLT 2 inhibitors:   Diuretics:   Aldosterone Antagonist:   Vasodilators & Nitrates:       Vital Signs  Visit Vitals  /52   Pulse 84   Temp 97.7 °F (36.5 °C) (Oral)   Resp 14   Ht 162.6 cm (64\")   Wt 70.7 kg (155 lb 13.8 oz)   SpO2 96%   BMI 26.75 kg/m²     Oxygen Therapy  SpO2: 96 %  Pulse Oximetry Type: Continuous  Device (Oxygen Therapy): room air  Flowsheet Rows      Flowsheet Row First Filed Value   Admission Height 162.6 cm (64\") Documented at 09/30/2024 1440   Admission Weight 70.8 kg (156 lb) Documented at 09/30/2024 1440          Intake & Output (last 3 days)         09/28 0701 09/29 0700 09/29 0701 09/30 0700 09/30 0701  10/01 0700 10/01 0701  10/02 0700    P.O.   480 240    Total Intake(mL/kg)   480 (6.8) 240 (3.4)    Net   +480 +240            Urine Unmeasured Occurrence   1 x           Lines, Drains & Airways       Active LDAs       Name " Placement date Placement time Site Days    Peripheral IV 09/30/24 1454 Right Antecubital 09/30/24  1454  Antecubital  less than 1                    MEDICAL HISTORY    Past Medical History:   Diagnosis Date    Acquired spondylolisthesis of lumbosacral region     Acute kidney injury 07/22/2022    Anxiety     Arthritis     Atrial fibrillation     paroxysmal    Broken shoulder     left-- due to fall 11-7-19 was at [facility]    Chronic pain disorder     Chronic systolic CHF (congestive heart failure) 01/05/2023    EF 36-40%    Coronary artery disease involving native coronary artery of native heart without angina pectoris 04/02/2021    nonobstructive    DDD (degenerative disc disease), cervical     DDD (degenerative disc disease), lumbar     Depression     Edema     9/2020 foot    GERD (gastroesophageal reflux disease)     H/O fall     Heart failure with mid-range ejection fraction 03/05/2022    EF 45% per 2D echo    Hypertension     Hypothyroidism     Insomnia     Low back pain     Moderate mitral regurgitation 01/05/2023    Neuropathy     Nonrheumatic tricuspid valve regurgitation     Pain in both feet     Polypharmacy     PONV (postoperative nausea and vomiting)     Pulmonary hypertension     Radiculopathy     Restless legs     Sacroiliitis     Sick sinus syndrome     Added automatically from request for surgery [id number]    Spondylolisthesis     Stage 3a chronic kidney disease     Urinary incontinence     Vitamin D deficiency         SURGICAL HISTORY    Past Surgical History:   Procedure Laterality Date    BACK SURGERY  07/19/2018    PDC &  PSF L3-L5 insitu    CARDIAC CATHETERIZATION N/A 4/2/2021    Procedure: Cardiac Catheterization/Vascular Study;  Surgeon: Barrett Evans MD;  Location: [facility] CATH INVASIVE LOCATION;  Service: Cardiovascular;  Laterality: N/A;    CARDIAC ELECTROPHYSIOLOGY PROCEDURE N/A 1/17/2023    Procedure: Pacemaker DC new;  Surgeon: Baljeet Valero MD;  Location: [facility] CATH  "INVASIVE LOCATION;  Service: Cardiovascular;  Laterality: N/A;    CHOLECYSTECTOMY      COLONOSCOPY      COLONOSCOPY N/A 7/10/2024    Procedure: COLONOSCOPY with cold snare polypectomy x1 and endoscopic clipping x2;  Surgeon: MARLIN Vanegas MD;  Location: [facility] ENDOSCOPY;  Service: Gastroenterology;  Laterality: N/A;  Post- colon polyps, diverticulosis    ENDOSCOPY N/A 9/14/2023    Procedure: ESOPHAGOGASTRODUODENOSCOPY;  Surgeon: Ulysses Lamas MD;  Location: [facility] ENDOSCOPY;  Service: Gastroenterology;  Laterality: N/A;  gastritis, inflammatory gastric polyp    ENDOSCOPY N/A 7/10/2024    Procedure: ESOPHAGOGASTRODUODENOSCOPY;  Surgeon: MARLIN Vanegas MD;  Location: [facility] ENDOSCOPY;  Service: Gastroenterology;  Laterality: N/A;  Post- hiatal hernia, gastric polyps, gastritis    HYSTERECTOMY      ROTATOR CUFF REPAIR Left         FAMILY HISTORY    Family History   Problem Relation Age of Onset    Cancer Father     Diabetes Son     Cancer Paternal Aunt     Heart disease Paternal Aunt     Cancer Paternal Uncle        SOCIAL HISTORY    Social History     Tobacco Use    Smoking status: Never     Passive exposure: Never    Smokeless tobacco: Never   Substance Use Topics    Alcohol use: No        ALLERGIES    Allergies   Allergen Reactions    Baclofen Rash    Codeine Nausea Only    Ibuprofen Unknown (See Comments)     Patient doesn't know----Motin    Methocarbamol Unknown (See Comments)     Patient doesn't know    Naproxen Unknown (See Comments)     Pt. Doesn't know     Tizanidine Hcl Other (See Comments)     Syncope     Tolmetin Dizziness     Pt. Doesn't know-- same as Tolectin              /52   Pulse 84   Temp 97.7 °F (36.5 °C) (Oral)   Resp 14   Ht 162.6 cm (64\")   Wt 70.7 kg (155 lb 13.8 oz)   SpO2 96%   BMI 26.75 kg/m²   Intake/Output last 3 shifts:  I/O last 3 completed shifts:  In: 480 [P.O.:480]  Out: -   Intake/Output this shift:  I/O this shift:  In: 240 [P.O.:240]  Out: - " "    PHYSICAL EXAM:    General: Alert, cooperative, no distress, appears stated age  Head:  Normocephalic, atraumatic, mucous membranes moist  Eyes:  Conjunctivae/corneas clear, EOM's intact     Neck:  Supple,  no adenopathy; no JVD or bruit  Lungs: Clear to auscultation bilaterally, no wheezes, rhonchi or rales are noted  Chest wall: No tenderness  Heart::  Irregularly irregular rate and rhythm, S1 and S2 normal, 2/6 murmur at the base  Abdomen: Soft, nontender, nondistended, bowel sounds active  Extremities: No cyanosis, clubbing, or edema   Pulses: 2+ and symmetric all extremities  Skin:  No rashes or lesions  Neuro/psych: A&O x3. CN II through XII are grossly intact with appropriate affect    Scheduled Meds:      apixaban, 5 mg, Oral, Q12H  [Held by provider] dilTIAZem, 60 mg, Oral, BID  ferrous sulfate, 324 mg, Oral, Daily With Breakfast  levothyroxine, 50 mcg, Oral, Q AM  metoprolol succinate XL, 50 mg, Oral, BID  spironolactone, 25 mg, Oral, Daily        Continuous Infusions:    dilTIAZem, 5-15 mg/hr, Last Rate: Stopped (09/30/24 2250)        PRN Meds:      cyclobenzaprine    nitroglycerin    sodium chloride     Data Review:     Results Review:     I reviewed the patient's new clinical results.    CBC    Results from last 7 days   Lab Units 09/30/24  1453   WBC 10*3/mm3 6.27   HEMOGLOBIN g/dL 13.9   PLATELETS 10*3/mm3 267     Cr Clearance Estimated Creatinine Clearance: 44.2 mL/min (by C-G formula based on SCr of 0.93 mg/dL).  Coag   Results from last 7 days   Lab Units 09/30/24  1453   INR  1.05   APTT seconds 29.7*     HbA1C   Lab Results   Component Value Date    HGBA1C 6.27 (H) 07/08/2024    HGBA1C 5.80 (H) 09/18/2023    HGBA1C 5.9 (H) 01/05/2023     Blood Glucose No results found for: \"POCGLU\"  Infection     CMP   Results from last 7 days   Lab Units 09/30/24  1513   SODIUM mmol/L 141   POTASSIUM mmol/L 4.0   CHLORIDE mmol/L 106   CO2 mmol/L 24.5   BUN mg/dL 18   CREATININE mg/dL 0.93   GLUCOSE mg/dL 98 " "  ALBUMIN g/dL 4.2   BILIRUBIN mg/dL 1.2   ALK PHOS U/L 63   AST (SGOT) U/L 15   ALT (SGPT) U/L <5     ABG      UA      CRISTY  No results found for: \"POCMETH\", \"POCAMPHET\", \"POCBARBITUR\", \"POCBENZO\", \"POCCOCAINE\", \"POCOPIATES\", \"POCOXYCODO\", \"POCPHENCYC\", \"POCPROPOXY\", \"POCTHC\", \"POCTRICYC\"  Lysis Labs   Results from last 7 days   Lab Units 09/30/24  1513 09/30/24  1453   INR   --  1.05   APTT seconds  --  29.7*   HEMOGLOBIN g/dL  --  13.9   PLATELETS 10*3/mm3  --  267   CREATININE mg/dL 0.93  --      Radiology(recent) XR Chest 1 View    Result Date: 9/30/2024  Impression: No acute process. Electronically Signed: Edil Joshua MD  9/30/2024 3:10 PM EDT  Workstation ID: [id number]       Results from last 7 days   Lab Units 10/01/24  0411   HSTROP T ng/L 12       X-rays, labs reviewed personally by physician.    ECG/EMG Results (most recent)       Procedure Component Value Units Date/Time    Telemetry Scan [[id number]] Resulted: 09/30/24     Updated: 10/01/24 0422    Telemetry Scan [[id number]] Resulted: 09/30/24     Updated: 10/01/24 0422    ECG 12 Lead ED Triage Standing Order; Chest Pain [[id number]] Collected: 10/01/24 0428     Updated: 10/01/24 0430     QT Interval 374 ms      QTC Interval 464 ms     Narrative:      HEART RATE=92  bpm  RR Pudxlpiz=833  ms  NV Interval=  ms  P Horizontal Axis=  deg  P Front Axis=  deg  QRSD Interval=81  ms  QT Cmysclcv=954  ms  UVdQ=766  ms  QRS Axis=59  deg  T Wave Axis=224  deg  - ABNORMAL ECG -  Atrial fibrillation  Nonspecific repol abnormality, diffuse leads  Date and Time of Study:2024-10-01 04:28:57    ECG 12 Lead ED Triage Standing Order; Chest Pain [[id number]] Collected: 09/30/24 1436     Updated: 10/01/24 0607     QT Interval 449 ms      QTC Interval 535 ms     Narrative:      HEART RATE=85  bpm  RR Befpjyig=953  ms  NV Interval=  ms  P Horizontal Axis=  deg  P Front Axis=  deg  QRSD Naywgekx=448  ms  QT Fkzjfvyu=164  ms  FEjI=300  ms  QRS Axis=-45  deg  T Wave Axis=126  deg  - " "ABNORMAL ECG -  Atrial fibrillation  Left bundle branch block  When compared with ECG of 24-Jul-2024 14:22:18,  Significant change in rhythm  New conduction abnormality  Electronically Signed By: Keyur Hernandez (KEVEN) 2024-10-01 06:07:04  Date and Time of Study:2024-09-30 14:36:29    Telemetry Scan [[id number]] Resulted: 09/30/24     Updated: 10/01/24 1017              Medication Review:   I have reviewed the patient's current medication list  Scheduled Meds:apixaban, 5 mg, Oral, Q12H  [Held by provider] dilTIAZem, 60 mg, Oral, BID  ferrous sulfate, 324 mg, Oral, Daily With Breakfast  levothyroxine, 50 mcg, Oral, Q AM  metoprolol succinate XL, 50 mg, Oral, BID  spironolactone, 25 mg, Oral, Daily      Continuous Infusions:dilTIAZem, 5-15 mg/hr, Last Rate: Stopped (09/30/24 2250)      PRN Meds:.  cyclobenzaprine    nitroglycerin    sodium chloride    Imaging:  Imaging Results (Last 72 Hours)       Procedure Component Value Units Date/Time    XR Chest 1 View [[id number]] Collected: 09/30/24 1503     Updated: 09/30/24 1512    Narrative:      XR CHEST 1 VW    Date of Exam: 9/30/2024 2:59 PM EDT    Indication: Chest Pain Triage Protocol    Comparison: 7/24/2024    Findings:  Patient rotated to the right. Left-sided AICD noted. The lungs are without consolidation. Negative for pneumothorax. No pleural effusion. Osseous structures appear intact.      Impression:      Impression:  No acute process.        Electronically Signed: Edil Joshua MD    9/30/2024 3:10 PM EDT    Workstation ID: [id number]              KALYANI Tay  10/01/24  11:40 EDT      EMR Dragon/Transcription:   \"Dictated utilizing Dragon dictation\".              Electronically signed by KALYANI Tay, 10/01/24, 11:40 AM EDT.  Copied text in this note has been reviewed by me and is accurate as of 10/01/24.      Patient seen and examined.  Patient was recently seen for atrial fibrillation and was started on amiodarone and cardioversion back to " atrial fibrillation with symptoms.  Patient has a pacemaker in situ  Additionally  patient has moderate to severe MR and TR      Physical Exam    General:      well developed, well nourished, in no acute distress.    Head:      normocephalic and atraumatic.    Eyes:      PERRL/EOM intact, conjunctivae and sclerae clear without nystagmus.    Neck:      no  thyromegaly, trachea central with normal respiratory effort  Lungs:      clear bilaterally to auscultation.    Heart:       irregular rate and rhythm, S1, S2 without murmurs, rubs, or gallops  Skin:      intact without lesions or rashes.    Psych:      alert and cooperative; normal mood and affect; normal attention span and concentration.        Patient is off intravenous Cardizem for rate control.  Currently on beta-blockers and oral Cardizem.  Progressive worsening of TSH and therefore reducing amiodarone on this patient.  Add digoxin for better rate control  Patient will likely end up needing AF ablation which can be scheduled as an outpatient once AF rate is well-controlled.        Electronically signed by Baljeet Valero MD, 10/01/24, 1:16 PM EDT.

## 2024-10-02 ENCOUNTER — TELEPHONE (OUTPATIENT)
Dept: FAMILY MEDICINE CLINIC | Facility: CLINIC | Age: 84
End: 2024-10-02
Payer: MEDICARE

## 2024-10-02 ENCOUNTER — TRANSCRIBE ORDERS (OUTPATIENT)
Dept: HOME HEALTH SERVICES | Facility: HOME HEALTHCARE | Age: 84
End: 2024-10-02
Payer: MEDICARE

## 2024-10-02 ENCOUNTER — DOCUMENTATION (OUTPATIENT)
Dept: HOME HEALTH SERVICES | Facility: HOME HEALTHCARE | Age: 84
End: 2024-10-02
Payer: MEDICARE

## 2024-10-02 ENCOUNTER — READMISSION MANAGEMENT (OUTPATIENT)
Dept: CALL CENTER | Facility: HOSPITAL | Age: 84
End: 2024-10-02
Payer: MEDICARE

## 2024-10-02 VITALS
DIASTOLIC BLOOD PRESSURE: 83 MMHG | HEART RATE: 70 BPM | OXYGEN SATURATION: 100 % | HEIGHT: 64 IN | RESPIRATION RATE: 16 BRPM | WEIGHT: 155.2 LBS | SYSTOLIC BLOOD PRESSURE: 138 MMHG | TEMPERATURE: 97.9 F | BODY MASS INDEX: 26.5 KG/M2

## 2024-10-02 DIAGNOSIS — I10 ESSENTIAL (PRIMARY) HYPERTENSION: Chronic | ICD-10-CM

## 2024-10-02 DIAGNOSIS — I48.91 ATRIAL FIBRILLATION WITH RVR: ICD-10-CM

## 2024-10-02 DIAGNOSIS — I48.0 PAROXYSMAL ATRIAL FIBRILLATION: Primary | Chronic | ICD-10-CM

## 2024-10-02 DIAGNOSIS — Z95.0 PRESENCE OF CARDIAC PACEMAKER: Chronic | ICD-10-CM

## 2024-10-02 DIAGNOSIS — I48.0 PAROXYSMAL ATRIAL FIBRILLATION: Primary | ICD-10-CM

## 2024-10-02 DIAGNOSIS — I50.22 CHRONIC HFREF (HEART FAILURE WITH REDUCED EJECTION FRACTION): Chronic | ICD-10-CM

## 2024-10-02 LAB
ALBUMIN SERPL-MCNC: 4 G/DL (ref 3.5–5.2)
ALBUMIN/GLOB SERPL: 1.5 G/DL
ALP SERPL-CCNC: 62 U/L (ref 39–117)
ALT SERPL W P-5'-P-CCNC: <5 U/L (ref 1–33)
ANION GAP SERPL CALCULATED.3IONS-SCNC: 9.3 MMOL/L (ref 5–15)
AST SERPL-CCNC: 16 U/L (ref 1–32)
BASOPHILS # BLD AUTO: 0.06 10*3/MM3 (ref 0–0.2)
BASOPHILS NFR BLD AUTO: 1.1 % (ref 0–1.5)
BILIRUB SERPL-MCNC: 0.7 MG/DL (ref 0–1.2)
BUN SERPL-MCNC: 19 MG/DL (ref 8–23)
BUN/CREAT SERPL: 19.2 (ref 7–25)
CALCIUM SPEC-SCNC: 9 MG/DL (ref 8.6–10.5)
CHLORIDE SERPL-SCNC: 105 MMOL/L (ref 98–107)
CO2 SERPL-SCNC: 24.7 MMOL/L (ref 22–29)
CREAT SERPL-MCNC: 0.99 MG/DL (ref 0.57–1)
DEPRECATED RDW RBC AUTO: 61.9 FL (ref 37–54)
EGFRCR SERPLBLD CKD-EPI 2021: 56.7 ML/MIN/1.73
EOSINOPHIL # BLD AUTO: 0.15 10*3/MM3 (ref 0–0.4)
EOSINOPHIL NFR BLD AUTO: 2.8 % (ref 0.3–6.2)
ERYTHROCYTE [DISTWIDTH] IN BLOOD BY AUTOMATED COUNT: 17.9 % (ref 12.3–15.4)
GLOBULIN UR ELPH-MCNC: 2.6 GM/DL
GLUCOSE SERPL-MCNC: 105 MG/DL (ref 65–99)
HCT VFR BLD AUTO: 39.5 % (ref 34–46.6)
HGB BLD-MCNC: 12.7 G/DL (ref 12–15.9)
IMM GRANULOCYTES # BLD AUTO: 0.01 10*3/MM3 (ref 0–0.05)
IMM GRANULOCYTES NFR BLD AUTO: 0.2 % (ref 0–0.5)
LYMPHOCYTES # BLD AUTO: 2.22 10*3/MM3 (ref 0.7–3.1)
LYMPHOCYTES NFR BLD AUTO: 41 % (ref 19.6–45.3)
MAGNESIUM SERPL-MCNC: 2.1 MG/DL (ref 1.6–2.4)
MCH RBC QN AUTO: 30 PG (ref 26.6–33)
MCHC RBC AUTO-ENTMCNC: 32.2 G/DL (ref 31.5–35.7)
MCV RBC AUTO: 93.2 FL (ref 79–97)
MONOCYTES # BLD AUTO: 0.45 10*3/MM3 (ref 0.1–0.9)
MONOCYTES NFR BLD AUTO: 8.3 % (ref 5–12)
NEUTROPHILS NFR BLD AUTO: 2.53 10*3/MM3 (ref 1.7–7)
NEUTROPHILS NFR BLD AUTO: 46.6 % (ref 42.7–76)
NRBC BLD AUTO-RTO: 0 /100 WBC (ref 0–0.2)
PHOSPHATE SERPL-MCNC: 3.6 MG/DL (ref 2.5–4.5)
PLATELET # BLD AUTO: 217 10*3/MM3 (ref 140–450)
PMV BLD AUTO: 9.4 FL (ref 6–12)
POTASSIUM SERPL-SCNC: 4.5 MMOL/L (ref 3.5–5.2)
PROT SERPL-MCNC: 6.6 G/DL (ref 6–8.5)
RBC # BLD AUTO: 4.24 10*6/MM3 (ref 3.77–5.28)
SODIUM SERPL-SCNC: 139 MMOL/L (ref 136–145)
WBC NRBC COR # BLD AUTO: 5.42 10*3/MM3 (ref 3.4–10.8)

## 2024-10-02 PROCEDURE — 83735 ASSAY OF MAGNESIUM: CPT | Performed by: HOSPITALIST

## 2024-10-02 PROCEDURE — 85025 COMPLETE CBC W/AUTO DIFF WBC: CPT | Performed by: HOSPITALIST

## 2024-10-02 PROCEDURE — 99232 SBSQ HOSP IP/OBS MODERATE 35: CPT | Performed by: INTERNAL MEDICINE

## 2024-10-02 PROCEDURE — 99233 SBSQ HOSP IP/OBS HIGH 50: CPT | Performed by: NURSE PRACTITIONER

## 2024-10-02 PROCEDURE — 97116 GAIT TRAINING THERAPY: CPT

## 2024-10-02 PROCEDURE — 80053 COMPREHEN METABOLIC PANEL: CPT | Performed by: HOSPITALIST

## 2024-10-02 PROCEDURE — 97110 THERAPEUTIC EXERCISES: CPT

## 2024-10-02 PROCEDURE — 97165 OT EVAL LOW COMPLEX 30 MIN: CPT

## 2024-10-02 PROCEDURE — 84100 ASSAY OF PHOSPHORUS: CPT | Performed by: HOSPITALIST

## 2024-10-02 RX ORDER — METOPROLOL SUCCINATE 50 MG/1
50 TABLET, EXTENDED RELEASE ORAL 2 TIMES DAILY
Qty: 60 TABLET | Refills: 0 | Status: SHIPPED | OUTPATIENT
Start: 2024-10-02 | End: 2024-10-04 | Stop reason: SDUPTHER

## 2024-10-02 RX ORDER — DIGOXIN 250 MCG
250 TABLET ORAL DAILY
Qty: 30 TABLET | Refills: 0 | Status: SHIPPED | OUTPATIENT
Start: 2024-10-03 | End: 2024-10-04 | Stop reason: SDUPTHER

## 2024-10-02 RX ADMIN — Medication 10 ML: at 08:42

## 2024-10-02 RX ADMIN — METOPROLOL SUCCINATE 50 MG: 50 TABLET, EXTENDED RELEASE ORAL at 19:59

## 2024-10-02 RX ADMIN — LEVOTHYROXINE SODIUM 50 MCG: 0.05 TABLET ORAL at 05:02

## 2024-10-02 RX ADMIN — DIGOXIN 250 MCG: 250 TABLET ORAL at 08:42

## 2024-10-02 RX ADMIN — METOPROLOL SUCCINATE 50 MG: 50 TABLET, EXTENDED RELEASE ORAL at 08:42

## 2024-10-02 RX ADMIN — APIXABAN 5 MG: 5 TABLET, FILM COATED ORAL at 19:59

## 2024-10-02 RX ADMIN — SPIRONOLACTONE 25 MG: 25 TABLET ORAL at 08:41

## 2024-10-02 RX ADMIN — CYCLOBENZAPRINE 5 MG: 10 TABLET, FILM COATED ORAL at 01:27

## 2024-10-02 RX ADMIN — FERROUS SULFATE TAB EC 324 MG (65 MG FE EQUIVALENT) 324 MG: 324 (65 FE) TABLET DELAYED RESPONSE at 08:41

## 2024-10-02 RX ADMIN — APIXABAN 5 MG: 5 TABLET, FILM COATED ORAL at 08:42

## 2024-10-02 RX ADMIN — ACETAMINOPHEN 650 MG: 325 TABLET, FILM COATED ORAL at 03:20

## 2024-10-02 NOTE — PLAN OF CARE
"Goal Outcome Evaluation:  Plan of Care Reviewed With: patient   Assessment: Catalina Moreno presents with functional mobility impairments which indicate the need for skilled intervention. Pt was pleasant and cooperative and eager to ambulate. Pt ambulated with FF posture. PT recommended to pt that she use a rolling walker at home and pt was agreeable.  Tolerating session today without incident. Will continue to follow and progress as tolerated.     Plan/Recommendations:   If medically appropriate, Low Intensity Therapy recommended post-acute care - This is recommended as therapy feels this patient would require 2-3 visits per week. OP or HH would be the best option depending on patient's home bound status. Consider, if the patient has other  \"skilled\" needs such as wounds, IV antibiotics, etc. Combined with \"low intensity\" could also equate to a SNF. If patient is medically complex, consider LTAC. Pt requires no DME at discharge.     Pt desires Home with family assist and Home Health at discharge. Pt cooperative; agreeable to therapeutic recommendations and plan of care.        Anticipated Discharge Disposition (PT): home with assist, home with home health, assisted living                        "

## 2024-10-02 NOTE — CASE MANAGEMENT/SOCIAL WORK
Continued Stay Note   Gabriel     Patient Name: Catalina Moreno  MRN: [id number]  Today's Date: 10/2/2024    Admit Date: 9/30/2024    Plan: Anticipate return home alone [facility] (current, pending acceptance on [facility]).   Discharge Plan       Row Name 10/02/24 1227       Plan    Plan Anticipate return home alone with [facility] (current, pending acceptance on [facility]).    Plan Comments PT recommending return home with home health at this time. CM contacted [facility] liaison Danni to inquire if [facility] will be able to accept patient back and continue working on eventual Assisted Living Facility placement. Inquired which skilled nursing the  with  had been working on and liaison reported she would check.             Kathy Celeste RN     Office Phone: [phone]  Office Cell: [phone]

## 2024-10-02 NOTE — PROGRESS NOTES
CARDIOLOGY PROGRESS NOTE:    Catalina Moreno  84 y.o.  female  1940  [id number]      Referring Provider: Hospitalist     Reason for follow-up: Shortness of breath and palpitations     Patient Care Team:  Liv Duron APRN as PCP - General (Nurse Practitioner)  Flash Gonzalez MD as Consulting Physician (Cardiology)  Shefali Worthy MD as Consulting Physician (Pain Medicine)  Demetrice Gupta APRN as Nurse Practitioner (Cardiology)    Subjective .  Patient doing well without any symptoms today    Objective lying in bed comfortably     Review of Systems   Constitutional: Negative for malaise/fatigue.   Cardiovascular:  Negative for chest pain, dyspnea on exertion, leg swelling and palpitations.   Respiratory:  Negative for cough and shortness of breath.    Gastrointestinal:  Negative for abdominal pain, nausea and vomiting.   Neurological:  Negative for dizziness, focal weakness, headaches, light-headedness and numbness.   All other systems reviewed and are negative.      Allergies: Baclofen, Codeine, Ibuprofen, Methocarbamol, Naproxen, Tizanidine hcl, and Tolmetin    Scheduled Meds:apixaban, 5 mg, Oral, Q12H  digoxin, 250 mcg, Oral, Daily  [Held by provider] dilTIAZem, 60 mg, Oral, BID  ferrous sulfate, 324 mg, Oral, Daily With Breakfast  levothyroxine, 50 mcg, Oral, Q AM  metoprolol succinate XL, 50 mg, Oral, BID  spironolactone, 25 mg, Oral, Daily      Continuous Infusions:   PRN Meds:.  acetaminophen    cyclobenzaprine    nitroglycerin    sodium chloride        VITAL SIGNS  Vitals:    10/01/24 2101 10/02/24 0120 10/02/24 0452 10/02/24 0844   BP: 91/53 112/60 120/58 107/57   BP Location: Left arm Left arm Left arm Left arm   Patient Position: Lying Lying Sitting Lying   Pulse: 79 99 82 72   Resp: 15 12 13 17   Temp: 98.1 °F (36.7 °C) 97.8 °F (36.6 °C) 97.8 °F (36.6 °C) 97.6 °F (36.4 °C)   TempSrc: Oral Oral Oral Oral   SpO2: 97% 95% 96% 96%   Weight:   70.4 kg (155 lb 3.3 oz)    Height:      "      Flowsheet Rows      Flowsheet Row First Filed Value   Admission Height 162.6 cm (64\") Documented at 09/30/2024 1440   Admission Weight 70.8 kg (156 lb) Documented at 09/30/2024 1440             TELEMETRY: Ventricular pacemaker rhythm    Physical Exam:  Constitutional:       Appearance: Well-developed.   Eyes:      General: No scleral icterus.     Conjunctiva/sclera: Conjunctivae normal.   HENT:      Head: Normocephalic and atraumatic.   Neck:      Vascular: No carotid bruit or JVD.   Pulmonary:      Effort: Pulmonary effort is normal.      Breath sounds: Normal breath sounds. No wheezing. No rales.   Cardiovascular:      Normal rate. Regular rhythm.   Pulses:     Intact distal pulses.   Abdominal:      General: Bowel sounds are normal.      Palpations: Abdomen is soft.   Musculoskeletal:      Cervical back: Normal range of motion and neck supple. Skin:     General: Skin is warm and dry.      Findings: No rash.   Neurological:      Mental Status: Alert.          Results Review:   I reviewed the patient's new clinical results.  Lab Results (last 24 hours)       Procedure Component Value Units Date/Time    Magnesium [[id number]]  (Normal) Collected: 10/02/24 0127    Specimen: Blood from Arm, Right Updated: 10/02/24 0206     Magnesium 2.1 mg/dL     Comprehensive Metabolic Panel [[id number]]  (Abnormal) Collected: 10/02/24 0127    Specimen: Blood from Arm, Right Updated: 10/02/24 0206     Glucose 105 mg/dL      BUN 19 mg/dL      Creatinine 0.99 mg/dL      Sodium 139 mmol/L      Potassium 4.5 mmol/L      Comment: Slight hemolysis detected by analyzer. Result may be falsely elevated.        Chloride 105 mmol/L      CO2 24.7 mmol/L      Calcium 9.0 mg/dL      Total Protein 6.6 g/dL      Albumin 4.0 g/dL      ALT (SGPT) <5 U/L      AST (SGOT) 16 U/L      Alkaline Phosphatase 62 U/L      Total Bilirubin 0.7 mg/dL      Globulin 2.6 gm/dL      A/G Ratio 1.5 g/dL      BUN/Creatinine Ratio 19.2     Anion Gap 9.3 mmol/L      " eGFR 56.7 mL/min/1.73     Narrative:      GFR Normal >60  Chronic Kidney Disease <60  Kidney Failure <15    The GFR formula is only valid for adults with stable renal function between ages 18 and 70.    Phosphorus [[id number]]  (Normal) Collected: 10/02/24 0127    Specimen: Blood from Arm, Right Updated: 10/02/24 0201     Phosphorus 3.6 mg/dL     CBC & Differential [[id number]]  (Abnormal) Collected: 10/02/24 0127    Specimen: Blood from Arm, Right Updated: 10/02/24 0139    Narrative:      The following orders were created for panel order CBC & Differential.  Procedure                               Abnormality         Status                     ---------                               -----------         ------                     CBC Auto Differential[[id number]]        Abnormal            Final result                 Please view results for these tests on the individual orders.    CBC Auto Differential [[id number]]  (Abnormal) Collected: 10/02/24 0127    Specimen: Blood from Arm, Right Updated: 10/02/24 0139     WBC 5.42 10*3/mm3      RBC 4.24 10*6/mm3      Hemoglobin 12.7 g/dL      Hematocrit 39.5 %      MCV 93.2 fL      MCH 30.0 pg      MCHC 32.2 g/dL      RDW 17.9 %      RDW-SD 61.9 fl      MPV 9.4 fL      Platelets 217 10*3/mm3      Neutrophil % 46.6 %      Lymphocyte % 41.0 %      Monocyte % 8.3 %      Eosinophil % 2.8 %      Basophil % 1.1 %      Immature Grans % 0.2 %      Neutrophils, Absolute 2.53 10*3/mm3      Lymphocytes, Absolute 2.22 10*3/mm3      Monocytes, Absolute 0.45 10*3/mm3      Eosinophils, Absolute 0.15 10*3/mm3      Basophils, Absolute 0.06 10*3/mm3      Immature Grans, Absolute 0.01 10*3/mm3      nRBC 0.0 /100 WBC     T3, Free [[id number]]  (Abnormal) Collected: 09/30/24 1724    Specimen: Blood Updated: 10/01/24 1610     T3, Free 1.67 pg/mL     Narrative:      Results may be falsely increased if patient taking Biotin.              Imaging Results (Last 24 Hours)       ** No results found for the  last 24 hours. **            EKG      I personally viewed and interpreted the patient's EKG/Telemetry data:    ECHOCARDIOGRAM:  Results for orders placed during the hospital encounter of 07/05/24    Adult Transthoracic Echo Complete w/ Color, Spectral and Contrast if necessary per protocol    Interpretation Summary    Left ventricular ejection fraction appears to be 46 - 50%.    Left ventricular wall thickness is consistent with moderate concentric hypertrophy.    The left atrial cavity is severely dilated.    The right atrial cavity is moderate to severely  dilated.    Abnormal mitral valve structure consistent with dilated annulus.    Moderate to severe mitral valve regurgitation is present with a centrally-directed jet noted.    Moderate to severe tricuspid valve regurgitation is present.    Estimated right ventricular systolic pressure from tricuspid regurgitation is moderately elevated (45-55 mmHg).    Transthoracic echocardiography reveals moderate LVH with overall EF between 45 to 50%  Moderate to severe central mitral regurgitation and moderate to severe TR with moderate pulm hypertension with mild to moderate AI.  Severe left atrial enlargement and moderate right atrial enlargement and no effusion.    Electronically signed by Baljeet Valero MD, 07/06/24, 3:26 PM EDT.       STRESS MYOVIEW:  Results for orders placed during the hospital encounter of 03/31/21    Stress Test With Myocardial Perfusion One Day    Interpretation Summary  · Findings consistent with a normal ECG stress test.  · Left ventricular ejection fraction is hyperdynamic (Calculated EF > 70%). .  · Impressions are consistent with a study indicating uncertain risk.  · Large apical defect with some reperfusion during the rest images cannot rule out ischemia EF 76% Clinical correlation is recommended.    Electronically signed by KALYANI Tay, 04/01/21, 11:25 AM EDT.       CARDIAC CATHETERIZATION:  Results for orders placed during  the hospital encounter of 03/31/21    Cardiac Catheterization/Vascular Study    Narrative  CARDIAC CATHETERIZATION REPORT    DATE OF PROCEDURE: 4/2/2021      INDICATION FOR PROCEDURE: Abnormal stress test    PROCEDURE PERFORMED: Left heart catheterization, coronary angiography,    PROCEDURE COMMENTS:    All the risks and benefits explained with the patient informed consent was obtained from the patient.  Patient was brought to the cardiac catheterization laboratory placed on the table draped and prepped in the usual sterile fashion.  2% lidocaine was used to anesthetize the right groin area.  Using the modified Seldinger technique 5 Marshallese sheath was inserted into the right femoral artery.    5 Marshallese JL4 catheter was used to perform the selective left coronary angiography    5 Marshallese JR4 catheter was used to perform the selective right coronary angiography    Angio-Seal was placed after exchanging five Marshallese sheath with six Marshallese sheath    Patient tolerated procedure well without any immediate complications      FINDINGS:    1. HEMODYNAMICS:  LV end-diastolic pressure 7 mmHg, /80 mmHg.    2. LEFT VENTRICULOGRAPHY: Not done patient had echocardiogram    3. CORONARY ANGIOGRAPHY:  Dominance right  Left Main: Large-caliber vessel bifurcates into LAD circumflex artery 0% stenosis  LAD: Large-caliber vessel gives rise to couple of medium caliber diagonal arteries multiple septal branches reaches the apex mild luminal irregularities noted less than 10% stenosis  CIRC: Large-caliber vessel gives rise to marginal lateral branches less than 10% stenosis  RCA: Large-caliber dominant artery gives rise to PDA and PL branches less than 5 to 10% stenosis    SUMMARY: No obstructive coronary artery disease    RECOMMENDATIONS: Evaluate for noncardiac causes of symptoms aggressive cardiac risk factor modification       OTHER:         Assessment & Plan     Atrial fibrillation with rapid ventricular response  Patient presented  with shortness of breath, fatigue  UDAY elevated  Rates improved with IV Cardizem  EKG showing atrial fibrillation with controlled ventricular response  Eliquis for anticoagulation  Continue metoprolol succinate  Prior admissions for atrial fibrillation with plans for outpatient CV  However, patient noncompliant with outpatient visits  Consider inpatient cardioversion as patient reports compliance with anticoagulation  Patient is seen by electrophysiologist who recommended continue current medical therapy and will schedule as an outpatient for ablation     History of sick sinus syndrome  Dual-chamber permanent pacemaker (2023)   Patient noncompliant with outpatient follow-ups and device interrogation     HFmEF  Echocardiogram with LVEF of 46 to 50%  Continue metoprolol succinate, Aldactone  proBNP 1449     Coronary artery disease  Cardiac catheterization from 2021 with minimal disease  Denies chest pain  Continue beta-blocker, Eliquis     Valvular disease  Moderate to severe mitral and tricuspid valve regurgitation  Severe atrial enlargement  Moderate pulmonary hypertension  Continue beta-blocker  Consider low-dose ACE inhibitor if pressure allows for afterload reduction     Hypertension  Blood pressure labile  Continue beta-blocker     Medical noncompliance  Extensive history of outpatient no-show appointments  Patient reports transportation disease    I discussed the patients findings and my recommendations with patient and nurse    Flash Gonzalez MD  10/02/24  11:01 EDT

## 2024-10-02 NOTE — THERAPY EVALUATION
Patient Name: Catalina Moreno  : 1940    MRN: [id number]                              Today's Date: 10/2/2024       Admit Date: 2024    Visit Dx:     ICD-10-CM ICD-9-CM   1. Atrial fibrillation with RVR  I48.91 427.31   2. Chest pain, unspecified type  R07.9 786.50   3. Arthritis  M19.90 716.90     Patient Active Problem List   Diagnosis    Arthritis    Depressive disorder    Primary fibromyalgia syndrome    Hypothyroidism    Atrial fibrillation with RVR    Stage 3a chronic kidney disease    Chronic low back pain    Age-related cognitive decline    Generalized anxiety disorder    Polyneuropathy, unspecified    Restless legs syndrome    Paroxysmal atrial fibrillation    Essential (primary) hypertension    Gastro-esophageal reflux disease without esophagitis    Pulmonary hypertension, unspecified    Coronary artery disease involving native coronary artery of native heart without angina pectoris    Presence of cardiac pacemaker    Chronic HFrEF (heart failure with reduced ejection fraction)    Atrial flutter with rapid ventricular response    Weakness    AF (atrial fibrillation)    Chronic heart failure with preserved ejection fraction (HFpEF)    Fall    Nasal bones, closed fracture    Iron deficiency anemia    Atrial fibrillation     Past Medical History:   Diagnosis Date    Acquired spondylolisthesis of lumbosacral region     Acute kidney injury 2022    Anxiety     Arthritis     Atrial fibrillation     paroxysmal    Broken shoulder     left-- due to fall 20 was at [facility]    Chronic pain disorder     Chronic systolic CHF (congestive heart failure) 2023    EF 36-40%    Coronary artery disease involving native coronary artery of native heart without angina pectoris 2021    nonobstructive    DDD (degenerative disc disease), cervical     DDD (degenerative disc disease), lumbar     Depression     Edema     2020 foot    GERD (gastroesophageal reflux disease)     H/O fall     Heart  failure with mid-range ejection fraction 03/05/2022    EF 45% per 2D echo    Hypertension     Hypothyroidism     Insomnia     Low back pain     Moderate mitral regurgitation 01/05/2023    Neuropathy     Nonrheumatic tricuspid valve regurgitation     Pain in both feet     Polypharmacy     PONV (postoperative nausea and vomiting)     Pulmonary hypertension     Radiculopathy     Restless legs     Sacroiliitis     Sick sinus syndrome     Added automatically from request for surgery [id number]    Spondylolisthesis     Stage 3a chronic kidney disease     Urinary incontinence     Vitamin D deficiency      Past Surgical History:   Procedure Laterality Date    BACK SURGERY  07/19/2018    PDC &  PSF L3-L5 insitu    CARDIAC CATHETERIZATION N/A 4/2/2021    Procedure: Cardiac Catheterization/Vascular Study;  Surgeon: Barrett Evans MD;  Location: [facility] CATH INVASIVE LOCATION;  Service: Cardiovascular;  Laterality: N/A;    CARDIAC ELECTROPHYSIOLOGY PROCEDURE N/A 1/17/2023    Procedure: Pacemaker DC new;  Surgeon: Baljeet Valero MD;  Location: [facility] CATH INVASIVE LOCATION;  Service: Cardiovascular;  Laterality: N/A;    CHOLECYSTECTOMY      COLONOSCOPY      COLONOSCOPY N/A 7/10/2024    Procedure: COLONOSCOPY with cold snare polypectomy x1 and endoscopic clipping x2;  Surgeon: MARLIN Vanegas MD;  Location: [facility] ENDOSCOPY;  Service: Gastroenterology;  Laterality: N/A;  Post- colon polyps, diverticulosis    ENDOSCOPY N/A 9/14/2023    Procedure: ESOPHAGOGASTRODUODENOSCOPY;  Surgeon: Ulysses Laams MD;  Location: [facility] ENDOSCOPY;  Service: Gastroenterology;  Laterality: N/A;  gastritis, inflammatory gastric polyp    ENDOSCOPY N/A 7/10/2024    Procedure: ESOPHAGOGASTRODUODENOSCOPY;  Surgeon: MARLIN Vanegas MD;  Location: [facility] ENDOSCOPY;  Service: Gastroenterology;  Laterality: N/A;  Post- hiatal hernia, gastric polyps, gastritis    HYSTERECTOMY      ROTATOR CUFF REPAIR Left       General Information        Row Name 10/02/24 1521          OT Time and Intention    Document Type evaluation  -MS     Mode of Treatment occupational therapy  -MS       Row Name 10/02/24 1521          General Information    Patient Profile Reviewed yes  -MS     Prior Level of Function independent:;ADL's;all household mobility;community mobility;driving  -MS     Existing Precautions/Restrictions fall;cardiac  -MS     Barriers to Rehab medically complex  per chart review, medical non-compliant  -MS       Row Name 10/02/24 1521          Occupational Profile    Reason for Services/Referral (Occupational Profile) Pt is an 83 y/o F admitted to [facility] 9/30/24 for A.fib with RVR. Cardiology following, recommending follow up with OP CHEMO, medication compliance. Per chart review, pt has had several re-admissions d/t similar complaints. Chart review indicates pt is non-compliant with medical management and follow up appointments.  CXR (-) acute. PMHx significant for CKDIII, HTN, pacemaker in place, depressive disorder, fibromyalgia, CHF, hx falls, DDD, and polyneuropathy. At baseline pt resides alone in [facility] with 0 VLADIMIR. Pt typically (I) with ADLs and uses a quad cane or RW for mobility.  -MS       Row Name 10/02/24 1521          Living Environment    People in Home alone  -MS       Row Name 10/02/24 1521          Home Main Entrance    Number of Stairs, Main Entrance none  -MS       Row Name 10/02/24 1521          Stairs Within Home, Primary    Number of Stairs, Within Home, Primary none  -MS       Row Name 10/02/24 1521          Cognition    Orientation Status (Cognition) oriented x 4  -MS       Row Name 10/02/24 1521          Safety Issues, Functional Mobility    Impairments Affecting Function (Mobility) endurance/activity tolerance;strength  -MS               User Key  (r) = Recorded By, (t) = Taken By, (c) = Cosigned By      Initials Name Provider Type    Chrystal Tinoco, OT Occupational Therapist                     Mobility/ADL's       Row Name  10/02/24 1522          Bed Mobility    Bed Mobility supine-sit  -MS     Supine-Sit [place] (Bed Mobility) modified independence  -MS       Row Name 10/02/24 1522          Transfers    Transfers sit-stand transfer  -MS       Row Name 10/02/24 1522          Sit-Stand Transfer    Sit-Stand [place] (Transfers) standby assist  -MS     Assistive Device (Sit-Stand Transfers) walker, front-wheeled  -MS       Row Name 10/02/24 1522          Functional Mobility    Patient was able to Ambulate yes  -MS       Row Name 10/02/24 1522          Activities of Daily Living    BADL Assessment/Intervention lower body dressing  -MS       Row Name 10/02/24 1522          Lower Body Dressing Assessment/Training    [place] Level (Lower Body Dressing) don;socks;modified independence  -MS     Position (Lower Body Dressing) edge of bed sitting  -MS               User Key  (r) = Recorded By, (t) = Taken By, (c) = Cosigned By      Initials Name Provider Type    Chrystal Tinoco OT Occupational Therapist                   Obj/Interventions       Row Name 10/02/24 1523          Sensory Assessment (Somatosensory)    Sensory Assessment (Somatosensory) UE sensation intact  -MS       Row Name 10/02/24 1523          Vision Assessment/Intervention    Visual Impairment/Limitations WNL  -MS       Row Name 10/02/24 1523          Range of Motion Comprehensive    General Range of Motion bilateral upper extremity ROM WNL  -MS       Row Name 10/02/24 1523          Strength Comprehensive (MMT)    Comment, General Manual Muscle Testing (MMT) Assessment BUE grossly 3+/5  -MS       Row Name 10/02/24 1523          Balance    Balance Assessment sitting static balance;sitting dynamic balance;standing static balance;standing dynamic balance  -MS     Static Sitting Balance modified independence  -MS     Dynamic Sitting Balance modified independence  -MS     Position, Sitting Balance unsupported;sitting edge of bed  -MS     Static Standing Balance  standby assist  -MS     Dynamic Standing Balance contact guard  -MS     Position/Device Used, Standing Balance supported;walker, front-wheeled  -MS               User Key  (r) = Recorded By, (t) = Taken By, (c) = Cosigned By      Initials Name Provider Type    MS Chrystal Austin, OT Occupational Therapist                   Goals/Plan    No documentation.                  Clinical Impression       Row Name 10/02/24 1523          Pain Assessment    Pretreatment Pain Rating 0/10 - no pain  -MS     Posttreatment Pain Rating 0/10 - no pain  -MS       Row Name 10/02/24 1523          Plan of Care Review    Plan of Care Reviewed With patient  -MS     Progress no change  -MS     Outcome Evaluation Pt is an 83 y/o F admitted to [facility] 9/30/24 for A.fib with RVR. Cardiology following, recommending follow up with OP CHEMO, medication compliance. Per chart review, pt has had several re-admissions d/t similar complaints. Chart review indicates pt is non-compliant with medical management and follow up appointments. At baseline pt resides alone in [facility] with 0 VLADIMIR. Pt typically (I) with ADLs and uses a quad cane or RW for mobility. Pt reports neighbor assist with medication management and assist her at home if needed. This date pt A&Ox4 on RA sitting up in bed upon arrival. Pt c/o no pain, no concerns to dc home at this time. Pt completes bed mobility with mod I, comes to standing with CGA and ambulates short household distance with CGA/SBA with RW. Pt dons socks sitting edge of bed with mod I. Pt appears to function near baseline, VSS throughout. Pt functioning at baseline level, no skilled OT intervention warranted within acute setting. Pt may benefit from transition to higher level of care to assist in IADLs and medical compliance.  -MS       Row Name 10/02/24 1523          Therapy Assessment/Plan (OT)    Criteria for Skilled Therapeutic Interventions Met (OT) no;no problems identified which require skilled intervention  -MS     Therapy  Frequency (OT) evaluation only  -MS       Row Name 10/02/24 1523          Therapy Plan Review/Discharge Plan (OT)    Anticipated Discharge Disposition (OT) home with assist  -MS       Row Name 10/02/24 1523          Vital Signs    Pre Systolic BP Rehab 114  -MS     Pre Treatment Diastolic BP 70  -MS     Intra Systolic BP Rehab 117  -MS     Intra Treatment Diastolic BP 65  -MS     Pretreatment Heart Rate (beats/min) 76  -MS     Intratreatment Heart Rate (beats/min) 89  -MS     O2 Delivery Pre Treatment room air  -MS     O2 Delivery Intra Treatment room air  -MS     O2 Delivery Post Treatment room air  -MS     Pre Patient Position Supine  -MS     Intra Patient Position Standing  -MS     Post Patient Position Sitting  -MS       Row Name 10/02/24 1523          Positioning and Restraints    Pre-Treatment Position in bed  -MS     Post Treatment Position chair  -MS     In Chair notified nsg;reclined;call light within reach;encouraged to call for assist;exit alarm on;legs elevated  -MS               User Key  (r) = Recorded By, (t) = Taken By, (c) = Cosigned By      Initials Name Provider Type    Chrystal Tinoco OT Occupational Therapist                   Outcome Measures       Row Name 10/02/24 1528          How much help from another is currently needed...    Putting on and taking off regular lower body clothing? 4  -MS     Bathing (including washing, rinsing, and drying) 4  -MS     Toileting (which includes using toilet bed pan or urinal) 4  -MS     Putting on and taking off regular upper body clothing 4  -MS     Eating meals 4  -MS       Row Name 10/02/24 1528          Functional Assessment    Outcome Measure Options AM-PAC 6 Clicks Daily Activity (OT)  -MS               User Key  (r) = Recorded By, (t) = Taken By, (c) = Cosigned By      Initials Name Provider Type    Chrystal Tinoco OT Occupational Therapist                    Occupational Therapy Education       Title: PT OT SLP Therapies (Done)       Topic:  Occupational Therapy (Done)       Point: ADL training (Done)       Description:   Instruct learner(s) on proper safety adaptation and remediation techniques during self care or transfers.   Instruct in proper use of assistive devices.                  Learning Progress Summary             Patient Acceptance, E,TB, VU by MS at 10/2/2024 1528                         Point: Precautions (Done)       Description:   Instruct learner(s) on prescribed precautions during self-care and functional transfers.                  Learning Progress Summary             Patient Acceptance, E,TB, VU by MS at 10/2/2024 1528                         Point: Body mechanics (Done)       Description:   Instruct learner(s) on proper positioning and spine alignment during self-care, functional mobility activities and/or exercises.                  Learning Progress Summary             Patient Acceptance, E,TB, VU by MS at 10/2/2024 1528                                         User Key       Initials Effective Dates Name Provider Type Discipline    MS 07/13/22 -  Chrystal Austin OT Occupational Therapist OT                  OT Recommendation and Plan  Therapy Frequency (OT): evaluation only  Plan of Care Review  Plan of Care Reviewed With: patient  Progress: no change  Outcome Evaluation: Pt is an 83 y/o F admitted to [facility] 9/30/24 for A.fib with RVR. Cardiology following, recommending follow up with OP CHEMO, medication compliance. Per chart review, pt has had several re-admissions d/t similar complaints. Chart review indicates pt is non-compliant with medical management and follow up appointments. At baseline pt resides alone in [facility] with 0 VLADIMIR. Pt typically (I) with ADLs and uses a quad cane or RW for mobility. Pt reports neighbor assist with medication management and assist her at home if needed. This date pt A&Ox4 on RA sitting up in bed upon arrival. Pt c/o no pain, no concerns to dc home at this time. Pt completes bed mobility with mod I,  comes to standing with CGA and ambulates short household distance with CGA/SBA with RW. Pt dons socks sitting edge of bed with mod I. Pt appears to function near baseline, VSS throughout. Pt functioning at baseline level, no skilled OT intervention warranted within acute setting. Pt may benefit from transition to higher level of care to assist in IADLs and medical compliance.     Time Calculation:                   Chrystal Austin OT  10/2/2024

## 2024-10-02 NOTE — PROGRESS NOTES
Cardiology Progress Note-EP      Patient Care Team:  Liv Duron APRN as PCP - General (Nurse Practitioner)  Flash Gonzalez MD as Consulting Physician (Cardiology)  Shefali Worthy MD as Consulting Physician (Pain Medicine)  Demetrice Gupta APRN as Nurse Practitioner (Cardiology)    PATIENT IDENTIFICATION  Name: Catalina Moreno  Age: 84 y.o.  Sex: female  :  1940  MRN: [id number]             Length of stay:   LOS: 1 day     REASON FOR FOLLOW-UP  Atrial fibrillation with rapid ventricular response  Paroxysmal atrial fibrillation  Dual-chamber ICD implant/sick sinus syndrome    INTERVAL HISTORY  Patient seen and examined, chart and labs reviewed.  She is sitting on side of the bed and reports feeling much better, anxious to go home.  Rhythm paced with intermittent atrial fibrillation, rate 72.  Discussed with attending nurse-patient has been up walking with assistance of a walker.  She ambulated around the nurses station yesterday with assistance of PT and no symptoms reported.      SUBJECTIVE    Reports feeling well  Denies chest discomfort  Denies shortness of breath  Denies palpitations  Denies dizziness or lightheadedness      REVIEW OF SYSTEMS:  Pertinent items are noted in HPI, all other systems reviewed and negative    OBJECTIVE   Labs reviewed    ASSESSMENT  Shortness of breath/lightheadedness  Atrial fibrillation with rapid ventricular response  Abnormal thyroid study  History of paroxysmal atrial fibrillation  Elevated chads vascular score  Coagulopathy on Eliquis  History of sick sinus syndrome status post dual-chamber ICD  History of nonobstructive coronary artery disease  History of HFmrEF  Nonobstructive coronary artery disease  Valvular heart disease: Mod-severe MR/TR  Hypertension with hypertensive heart disease      RECOMMENDATIONS  Continue BB, CCB  Progressively worsening TSH-amiodarone discontinued  Continue current dose of digoxin for better rate control.  EP  "recommendations are for outpatient CHEMO to further evaluate severity of valvular heart disease as well as A-fib cryoablation.  Procedure, risks and benefits were discussed with the patient, questions were answered.  The patient would like to proceed and orders will be placed.  Okay for discharge per EP services.        CHF Guideline Directed Medical Therapy  Beta Blocker:   ARNI/ACE/ARB:   SGLT 2 inhibitors:   Diuretics:   Aldosterone Antagonist:   Vasodilators & Nitrates:     Vital Signs  Visit Vitals  /57 (BP Location: Left arm, Patient Position: Lying)   Pulse 72   Temp 97.6 °F (36.4 °C) (Oral)   Resp 17   Ht 162.6 cm (64\")   Wt 70.4 kg (155 lb 3.3 oz)   SpO2 96%   BMI 26.64 kg/m²     Oxygen Therapy  SpO2: 96 %  Pulse Oximetry Type: Continuous  Device (Oxygen Therapy): room air  Flowsheet Rows      Flowsheet Row First Filed Value   Admission Height 162.6 cm (64\") Documented at 09/30/2024 1440   Admission Weight 70.8 kg (156 lb) Documented at 09/30/2024 1440          Intake & Output (last 3 days)         09/29 0701  09/30 0700 09/30 0701  10/01 0700 10/01 0701  10/02 0700 10/02 0701  10/03 0700    P.O.  480 360     Total Intake(mL/kg)  480 (6.8) 360 (5.1)     Urine (mL/kg/hr)   100 (0.1)     Total Output   100     Net  +480 +260             Urine Unmeasured Occurrence  1 x 1 x           Lines, Drains & Airways       Active LDAs       Name Placement date Placement time Site Days    Peripheral IV 09/30/24 1454 Right Antecubital 09/30/24  1454  Antecubital  1                           /57 (BP Location: Left arm, Patient Position: Lying)   Pulse 72   Temp 97.6 °F (36.4 °C) (Oral)   Resp 17   Ht 162.6 cm (64\")   Wt 70.4 kg (155 lb 3.3 oz)   SpO2 96%   BMI 26.64 kg/m²   Intake/Output last 3 shifts:  I/O last 3 completed shifts:  In: 840 [P.O.:840]  Out: 100 [Urine:100]  Intake/Output this shift:  No intake/output data recorded.    PHYSICAL EXAM:    General: Alert, cooperative, no distress, appears " "stated age  Head:  Normocephalic, atraumatic, mucous membranes moist  Eyes:  Conjunctivae/corneas clear, EOM's intact     Neck:  Supple,  no adenopathy; no JVD or bruit  Lungs: Clear to auscultation bilaterally, no wheezes, rhonchi or rales are noted  Chest wall: No tenderness  Heart::  Regular rate and rhythm, S1 and S2 normal, soft murmur at the base  Abdomen: Soft, nontender, nondistended, bowel sounds active  Extremities: No cyanosis, clubbing, or edema   Pulses: 2+ and symmetric all extremities  Skin:  No rashes or lesions  Neuro/psych: A&O x3. CN II through XII are grossly intact with appropriate affect    Scheduled Meds:      apixaban, 5 mg, Oral, Q12H  digoxin, 250 mcg, Oral, Daily  [Held by provider] dilTIAZem, 60 mg, Oral, BID  ferrous sulfate, 324 mg, Oral, Daily With Breakfast  levothyroxine, 50 mcg, Oral, Q AM  metoprolol succinate XL, 50 mg, Oral, BID  spironolactone, 25 mg, Oral, Daily        Continuous Infusions:         PRN Meds:      acetaminophen    cyclobenzaprine    nitroglycerin    sodium chloride        Results Review:     I reviewed the patient's new clinical results.    CBC    Results from last 7 days   Lab Units 10/02/24  0127 09/30/24  1453   WBC 10*3/mm3 5.42 6.27   HEMOGLOBIN g/dL 12.7 13.9   PLATELETS 10*3/mm3 217 267     Cr Clearance Estimated Creatinine Clearance: 41.5 mL/min (by C-G formula based on SCr of 0.99 mg/dL).  Coag   Results from last 7 days   Lab Units 09/30/24  1453   INR  1.05   APTT seconds 29.7*     HbA1C   Lab Results   Component Value Date    HGBA1C 6.27 (H) 07/08/2024    HGBA1C 5.80 (H) 09/18/2023    HGBA1C 5.9 (H) 01/05/2023     Blood Glucose No results found for: \"POCGLU\"  Infection     CMP   Results from last 7 days   Lab Units 10/02/24  0127 09/30/24  1513   SODIUM mmol/L 139 141   POTASSIUM mmol/L 4.5 4.0   CHLORIDE mmol/L 105 106   CO2 mmol/L 24.7 24.5   BUN mg/dL 19 18   CREATININE mg/dL 0.99 0.93   GLUCOSE mg/dL 105* 98   ALBUMIN g/dL 4.0 4.2   BILIRUBIN " "mg/dL 0.7 1.2   ALK PHOS U/L 62 63   AST (SGOT) U/L 16 15   ALT (SGPT) U/L <5 <5     ABG      UA      CRISTY  No results found for: \"POCMETH\", \"POCAMPHET\", \"POCBARBITUR\", \"POCBENZO\", \"POCCOCAINE\", \"POCOPIATES\", \"POCOXYCODO\", \"POCPHENCYC\", \"POCPROPOXY\", \"POCTHC\", \"POCTRICYC\"  Lysis Labs   Results from last 7 days   Lab Units 10/02/24  0127 09/30/24  1513 09/30/24  1453   INR   --   --  1.05   APTT seconds  --   --  29.7*   HEMOGLOBIN g/dL 12.7  --  13.9   PLATELETS 10*3/mm3 217  --  267   CREATININE mg/dL 0.99 0.93  --      Radiology(recent) XR Chest 1 View    Result Date: 9/30/2024  Impression: No acute process. Electronically Signed: Edil Joshua MD  9/30/2024 3:10 PM EDT  Workstation ID: [id number]       Results from last 7 days   Lab Units 10/01/24  0411   HSTROP T ng/L 12       X-rays, labs reviewed personally by physician.    ECG/EMG Results (most recent)       Procedure Component Value Units Date/Time    Telemetry Scan [[id number]] Resulted: 09/30/24     Updated: 10/01/24 0422    Telemetry Scan [[id number]] Resulted: 09/30/24     Updated: 10/01/24 0422    ECG 12 Lead ED Triage Standing Order; Chest Pain [[id number]] Collected: 10/01/24 0428     Updated: 10/01/24 0430     QT Interval 374 ms      QTC Interval 464 ms     Narrative:      HEART RATE=92  bpm  RR Ghhnrent=181  ms  PA Interval=  ms  P Horizontal Axis=  deg  P Front Axis=  deg  QRSD Interval=81  ms  QT Vngejygw=681  ms  XZzQ=033  ms  QRS Axis=59  deg  T Wave Axis=224  deg  - ABNORMAL ECG -  Atrial fibrillation  Nonspecific repol abnormality, diffuse leads  Date and Time of Study:2024-10-01 04:28:57    ECG 12 Lead ED Triage Standing Order; Chest Pain [[id number]] Collected: 09/30/24 1436     Updated: 10/01/24 0607     QT Interval 449 ms      QTC Interval 535 ms     Narrative:      HEART RATE=85  bpm  RR Avmytmuv=800  ms  PA Interval=  ms  P Horizontal Axis=  deg  P Front Axis=  deg  QRSD Kqjkcvck=648  ms  QT Zovkilke=477  ms  UTyO=242  ms  QRS Axis=-45  deg  T " Wave Axis=126  deg  - ABNORMAL ECG -  Atrial fibrillation  Left bundle branch block  When compared with ECG of 24-Jul-2024 14:22:18,  Significant change in rhythm  New conduction abnormality  Electronically Signed By: Keyur Hernandez ([facility]) 2024-10-01 06:07:04  Date and Time of Study:2024-09-30 14:36:29    Telemetry Scan [[id number]] Resulted: 09/30/24     Updated: 10/01/24 1017    Telemetry Scan [[id number]] Resulted: 09/30/24     Updated: 10/01/24 1348    Telemetry Scan [[id number]] Resulted: 09/30/24     Updated: 10/01/24 1940    Telemetry Scan [[id number]] Resulted: 09/30/24     Updated: 10/01/24 1940    Telemetry Scan [[id number]] Resulted: 09/30/24     Updated: 10/01/24 2048    Telemetry Scan [[id number]] Resulted: 09/30/24     Updated: 10/02/24 1042    Telemetry Scan [[id number]] Resulted: 09/30/24     Updated: 10/02/24 1121              Medication Review:   I have reviewed the patient's current medication list  Scheduled Meds:apixaban, 5 mg, Oral, Q12H  digoxin, 250 mcg, Oral, Daily  [Held by provider] dilTIAZem, 60 mg, Oral, BID  ferrous sulfate, 324 mg, Oral, Daily With Breakfast  levothyroxine, 50 mcg, Oral, Q AM  metoprolol succinate XL, 50 mg, Oral, BID  spironolactone, 25 mg, Oral, Daily      Continuous Infusions:   PRN Meds:.  acetaminophen    cyclobenzaprine    nitroglycerin    sodium chloride    Imaging:  Imaging Results (Last 72 Hours)       Procedure Component Value Units Date/Time    XR Chest 1 View [[id number]] Collected: 09/30/24 1503     Updated: 09/30/24 1512    Narrative:      XR CHEST 1 VW    Date of Exam: 9/30/2024 2:59 PM EDT    Indication: Chest Pain Triage Protocol    Comparison: 7/24/2024    Findings:  Patient rotated to the right. Left-sided AICD noted. The lungs are without consolidation. Negative for pneumothorax. No pleural effusion. Osseous structures appear intact.      Impression:      Impression:  No acute process.        Electronically Signed: Edil Joshua MD    9/30/2024 3:10 PM EDT     "Workstation ID: [id number]              KALYANI Tay  10/02/24  11:42 EDT      EMR Dragon/Transcription:   \"Dictated utilizing Dragon dictation\".        Electronically signed by KALYANI Tay, 10/02/24, 11:42 AM EDT.  Copied text in this note has been reviewed by me and is accurate as of 10/02/24.    "

## 2024-10-02 NOTE — PLAN OF CARE
Goal Outcome Evaluation:  Plan of Care Reviewed With: patient        Progress: improving          Pt continues to complain of BLE pain as of a result from RLS. Her HR has remained controlled overnight however BP has been soft.

## 2024-10-02 NOTE — DISCHARGE SUMMARY
[facility]  Discharge Summary    Date of Service: 10/2/24  Patient Name: Catalina Moreno  : 1940  MRN: [id number]    Date of Admission: 2024  Discharge Diagnosis: #Paroxysmal A-fib with RVR  Date of Discharge:  10/2/24  Primary Care Physician: Liv Duron APRN      Presenting Problem:   Atrial fibrillation with RVR [I48.91]  Chest pain, unspecified type [R07.9]  Atrial fibrillation [I48.91]    Active and Resolved Hospital Problems:  Active Hospital Problems    Diagnosis POA    **Atrial fibrillation with RVR [I48.91] Yes    Atrial fibrillation [I48.91] Yes    Chronic HFrEF (heart failure with reduced ejection fraction) [I50.22] Yes    Restless legs syndrome [G25.81] Yes    Generalized anxiety disorder [F41.1] Yes    Gastro-esophageal reflux disease without esophagitis [K21.9] Yes    Stage 3a chronic kidney disease [N18.31] Yes    Hypothyroidism [E03.9] Yes    Depressive disorder [F32.A] Yes      Resolved Hospital Problems   No resolved problems to display.         Hospital Course     HPI:  Admitting team HPI   Catalina Moreno is a 84 y.o. female with a CMH of paroxysmal atrial fibrillation on Eliquis, hypothyroidism, hypertension, anxiety and depression, arthritis, GERD, heart failure with reduced ejection fraction EF 46 to 50% per echo dated 2024.  Who [facility] with a 2-day history of shortness of breath .  She denied any chest pain or pressure.  She does report she felt lightheaded when standing up.  No exacerbating or alleviating factors to the shortness of breath.  She arrived here by EMS she was told she had atrial fibrillation.  She denies any cough congestion fever .  She does report increasing generalized fatigue lately..       Hospital Course:  84-year-old female history of paroxysmal A-fib on Eliquis, hypothyroidism, nonobstructive CAD, HFmrEF, HTN admitted to [facility]  with generalized fatigue  weakness and shortness of breath     #Paroxysmal A-fib with RVR  Started on Cardizem drip on admission currently off  Seen by cardiology and EP   Currently on increased dose of Toprol 50 twice daily. Digoxin 250 mg QD started  Eliquis to continue  Amiodarone discontinued due to deranged TFTs  Off cardizem   Noted plan for out patient ablation   Encourage pt to be more compliant with out patient follow ups        #Hypothyroidism  TSH 13.9  Continue home dose of levothyroxine  Ft4 wnl      #Nonobstructive CAD  Stable  GDMT as tolerated     #HFmrEF  Not in exacerbation   GMDT as tolerated           DISCHARGE Follow Up Recommendations for labs and diagnostics: follow up with cardiology and EP         Day of Discharge     Vital Signs:  Temp:  [97.6 °F (36.4 °C)-98.1 °F (36.7 °C)] 97.8 °F (36.6 °C)  Heart Rate:  [] 70  Resp:  [12-17] 15  BP: ()/(49-61) 111/53    Physical Exam:  Physical Exam AOx3 NAD  RRR S1-S2 audible  Lungs with fair air entry  Abdomen soft nontender nondistended             Pertinent  and/or Most Recent Results     LAB RESULTS:      Lab 10/02/24  0127 09/30/24  1453   WBC 5.42 6.27   HEMOGLOBIN 12.7 13.9   HEMATOCRIT 39.5 43.7   PLATELETS 217 267   NEUTROS ABS 2.53 4.06   IMMATURE GRANS (ABS) 0.01 0.01   LYMPHS ABS 2.22 1.62   MONOS ABS 0.45 0.45   EOS ABS 0.15 0.08   MCV 93.2 91.6   PROTIME  --  11.4   APTT  --  29.7*         Lab 10/02/24  0127 10/01/24  0411 09/30/24  1513   SODIUM 139  --  141   POTASSIUM 4.5  --  4.0   CHLORIDE 105  --  106   CO2 24.7  --  24.5   ANION GAP 9.3  --  10.5   BUN 19  --  18   CREATININE 0.99  --  0.93   EGFR 56.7*  --  61.1   GLUCOSE 105*  --  98   CALCIUM 9.0  --  8.9   MAGNESIUM 2.1  --  2.1   PHOSPHORUS 3.6  --   --    TSH  --  13.900*  --          Lab 10/02/24  0127 09/30/24  1513   TOTAL PROTEIN 6.6 6.7   ALBUMIN 4.0 4.2   GLOBULIN 2.6 2.5   ALT (SGPT) <5 <5   AST (SGOT) 16 15   BILIRUBIN 0.7 1.2   ALK PHOS 62 63         Lab 10/01/24  0419  09/30/24  1724 09/30/24  1513 09/30/24  1453   PROBNP  --  1,449.0  --   --    HSTROP T 12 7 9  --    PROTIME  --   --   --  11.4   INR  --   --   --  1.05                 Brief Urine Lab Results  (Last result in the past 365 days)        Color   Clarity   Blood   Leuk Est   Nitrite   Protein   CREAT   Urine HCG        07/24/24 1504 Yellow   Clear   Negative   Trace   Negative   Negative                 Microbiology Results (last 10 days)       ** No results found for the last 240 hours. **            XR Chest 1 View    Result Date: 9/30/2024  Impression: Impression: No acute process. Electronically Signed: Edil Joshua MD  9/30/2024 3:10 PM EDT  Workstation ID: [id number]     Results for orders placed during the hospital encounter of 03/04/22    Duplex Venous Lower Extremity - Bilateral CAR    Interpretation Summary  · Normal bilateral lower extremity venous duplex scan.  · Nonvascular cystic mass located in the left posterior mid calf.      Results for orders placed during the hospital encounter of 03/04/22    Duplex Venous Lower Extremity - Bilateral CAR    Interpretation Summary  · Normal bilateral lower extremity venous duplex scan.  · Nonvascular cystic mass located in the left posterior mid calf.      Results for orders placed during the hospital encounter of 07/05/24    Adult Transthoracic Echo Complete w/ Color, Spectral and Contrast if necessary per protocol    Interpretation Summary    Left ventricular ejection fraction appears to be 46 - 50%.    Left ventricular wall thickness is consistent with moderate concentric hypertrophy.    The left atrial cavity is severely dilated.    The right atrial cavity is moderate to severely  dilated.    Abnormal mitral valve structure consistent with dilated annulus.    Moderate to severe mitral valve regurgitation is present with a centrally-directed jet noted.    Moderate to severe tricuspid valve regurgitation is present.    Estimated right ventricular systolic pressure  from tricuspid regurgitation is moderately elevated (45-55 mmHg).    Transthoracic echocardiography reveals moderate LVH with overall EF between 45 to 50%  Moderate to severe central mitral regurgitation and moderate to severe TR with moderate pulm hypertension with mild to moderate AI.  Severe left atrial enlargement and moderate right atrial enlargement and no effusion.    Electronically signed by Baljeet Valero MD, 07/06/24, 3:26 PM EDT.      Labs Pending at Discharge:      Procedures Performed           Consults:   Consults       Date and Time Order Name Status Description    9/30/2024  7:22 PM Inpatient Cardiology Consult Completed     9/30/2024  5:34 PM Hospitalist (on-call MD unless specified)                Discharge Details        Discharge Medications        New Medications        Instructions Start Date   digoxin 250 MCG tablet  Commonly known as: LANOXIN   250 mcg, Oral, Daily   Start Date: October 3, 2024            Changes to Medications        Instructions Start Date   metoprolol succinate XL 50 MG 24 hr tablet  Commonly known as: TOPROL-XL  What changed:   additional instructions  Another medication with the same name was removed. Continue taking this medication, and follow the directions you see here.   50 mg, Oral, 2 Times Daily             Continue These Medications        Instructions Start Date   apixaban 5 MG tablet tablet  Commonly known as: ELIQUIS   5 mg, Oral, Every 12 Hours Scheduled      cyclobenzaprine 5 MG tablet  Commonly known as: FLEXERIL   5 mg, Oral, 2 Times Daily PRN      ferrous gluconate 324 MG tablet  Commonly known as: FERGON   324 mg, Oral, Every Other Day      levothyroxine 50 MCG tablet  Commonly known as: SYNTHROID, LEVOTHROID   50 mcg, Oral, Daily      pantoprazole 40 MG EC tablet  Commonly known as: PROTONIX   40 mg, Oral, Daily      spironolactone 25 MG tablet  Commonly known as: ALDACTONE   25 mg, Oral, Daily PRN             Stop These Medications      amiodarone  200 MG tablet  Commonly known as: PACERONE     dilTIAZem 60 MG tablet  Commonly known as: CARDIZEM              Allergies   Allergen Reactions    Baclofen Rash    Codeine Nausea Only    Ibuprofen Unknown (See Comments)     Patient doesn't know----Motin    Methocarbamol Unknown (See Comments)     Patient doesn't know    Naproxen Unknown (See Comments)     Pt. Doesn't know     Tizanidine Hcl Other (See Comments)     Syncope     Tolmetin Dizziness     Pt. Doesn't know-- same as Tolectin         Discharge Disposition:   Home-Health Care Sv    Diet:  Hospital:  Diet Order   Procedures    Diet: Cardiac; Healthy Heart (2-3 Na+); Fluid Consistency: Thin (IDDSI 0)         Discharge Activity:         CODE STATUS:  Code Status and Medical Interventions: CPR (Attempt to Resuscitate); Full Support   Ordered at: 09/30/24 1921     Code Status (Patient has no pulse and is not breathing):    CPR (Attempt to Resuscitate)     Medical Interventions (Patient has pulse or is breathing):    Full Support         No future appointments.    Additional Instructions for the Follow-ups that You Need to Schedule       Ambulatory Referral to Home Health ([facility])   As directed      Face to Face Visit Date: 10/2/2024   Follow-up provider for Plan of Care?: I treated the patient in an acute care facility and will not continue treatment after discharge.   Follow-up provider: BENJAMIN NELSON [[id number]]   Reason/Clinical Findings: Paroxysmal A-fib with RVR   Describe mobility limitations that make leaving home difficult: ADL limited   Nursing/Therapeutic Services Requested: Skilled Nursing Physical Therapy Occupational Therapy   Skilled nursing orders: Medication education   PT orders: Therapeutic exercise   Occupational orders: Activities of daily living   Frequency: 1 Week 1                Time spent on Discharge including face to face service:  45 minutes    Signature: Electronically signed by Gaston Lopez MD, 10/02/24, 13:48 EDT.  [facility]  [facility] Hospitalist Team

## 2024-10-02 NOTE — THERAPY TREATMENT NOTE
"Subjective: Pt agreeable to therapeutic plan of care.    Objective:     Precautions - HFR    Bed mobility - Independent  Transfers - Modified-Independent  Ambulation - 300 feet CGA and with rolling walker    Therapeutic Exercise - 10 Reps B LE AROM unsupported sitting / EOB and standing    Vitals: WNL HR up to 112 with gait    Pain: 0 VAS   Location: N/A  Intervention for pain: N/A    Education: Energy conservation strategies    Assessment: Catalina Moreno presents with functional mobility impairments which indicate the need for skilled intervention. Pt was pleasant and cooperative and eager to ambulate. Pt ambulated with FF posture. PT recommended to pt that she use a rolling walker at home and pt was agreeable.  Tolerating session today without incident. Will continue to follow and progress as tolerated.     Plan/Recommendations:   If medically appropriate, Low Intensity Therapy recommended post-acute care - This is recommended as therapy feels this patient would require 2-3 visits per week. OP or HH would be the best option depending on patient's home bound status. Consider, if the patient has other  \"skilled\" needs such as wounds, IV antibiotics, etc. Combined with \"low intensity\" could also equate to a SNF. If patient is medically complex, consider LTAC. Pt requires no DME at discharge.     Pt desires Home with family assist and Home Health at discharge. Pt cooperative; agreeable to therapeutic recommendations and plan of care.         Basic Mobility 6-click:  Rollin = Total, A lot = 2, A little = 3; 4 = None  Supine>Sit:   1 = Total, A lot = 2, A little = 3; 4 = None   Sit>Stand with arms:  1 = Total, A lot = 2, A little = 3; 4 = None  Bed>Chair:   1 = Total, A lot = 2, A little = 3; 4 = None  Ambulate in room:  1 = Total, A lot = 2, A little = 3; 4 = None  3-5 Steps with railin = Total, A lot = 2, A little = 3; 4 = None  Score: 21    Modified J[place]uld: N/A = No pre-op stroke/TIA    Post-Tx " Position: Supine with HOB Elevated, Alarms activated, and Call light and personal items within reach  PPE: gloves

## 2024-10-02 NOTE — PROGRESS NOTES
Spoke with patient  Demographics verified and agreeable to Home Health services.Verified no other agency in home.  Additional info:  Pt agreeable for SOC to begin 10.8.24.   Disciplines: SN PT OT  [facility]: [facility]  PCP:  Liv AUGUSTE office contacted --waiting to see if she will follow for Home Health orders and sign the POC.

## 2024-10-02 NOTE — TELEPHONE ENCOUNTER
Caller: KYLAH QUAN [facility]    Best call back number: [phone]     What orders are you requesting (i.e. lab or imaging):   HOME HEALTH- PT, OT, SKILLED NURSING EVALUATION ON 10.8.24    In what timeframe would the patient need to come in: 10.8.24    Where will you receive your lab/imaging services: IN HOME    Additional notes:   PLEASE CALL WITH VERBALS TO CHRISTIAN DOSS.

## 2024-10-02 NOTE — CASE MANAGEMENT/SOCIAL WORK
Continued Stay Note  [facility]     Patient Name: Catalina Moreno  MRN: [id number]  Today's Date: 10/2/2024    Admit Date: 9/30/2024    Plan: Anticipate return home alone with [facility] (accepted, order in place).   Discharge Plan       Row Name 10/02/24 1518       Plan    Plan Anticipate return home alone with [facility] (accepted, order in place).    Patient/Family in Agreement with Plan yes    Provided Post Acute Provider List? Yes    Post Acute Provider List Home Health    Provided Post Acute Provider Quality & Resource List? Yes    Post Acute Provider Quality and Resource List Home Health    Delivered To Patient    Method of Delivery In person    Plan Comments DC orders noted. CM contacted [facility] liaison, Danni BERMUDEZ Liaison reported that they have to decline. Reported that patient has declined visits. Reported that patient will not allow sister or neighbor to help with care. Reported that Tonya with Liv [facility]on’s office could also provide more insight on situation if needed. CM met with patient at bedside to discuss d/c plan. She reported that she talked to her neighbor, Shwetha earlier today but did not mention discharge. Reported that she was not sure if Shwetha would be able to provide her transportation home. Reported that her son’s friend, Musa could be contacted to provide transportation if needed because she reported that he drives by her house. Musa can be reached at [phone]. CM provided update to bedside RN once patient ready for d/c. CM discussed HH with patient and notified her that [facility] is unable to continue her current services. Patient agreeable to CM reaching out to another  agency. [facility] referral sent in basket and confirmed with [address] that they can accept with plan to start services on Tuesday, 10/8.                 Expected Discharge Date and Time       Expected Discharge Date Expected Discharge Time    Oct 2, 2024         Kathy Celeste, BRENDAN     Office  Phone: [phone]  Office Cell: [phone]

## 2024-10-02 NOTE — PLAN OF CARE
Goal Outcome Evaluation:              Outcome Evaluation: patient says that A neighbor has locked patients keys in the house. Sons friend andrew that is going to help and pick patient up at hospital to drive her home said he could and was able to help her get into her house. andrew said he would call nruses station at [phone] and notify nurse that he was at the hospital to get patient. and would be waiting in car outside [facility] lobby. informed patient about above imformation. patient verbalized understanding.

## 2024-10-02 NOTE — PLAN OF CARE
Goal Outcome Evaluation:  Plan of Care Reviewed With: patient        Progress: no change  Outcome Evaluation: Pt is an 85 y/o F admitted to [facility] 9/30/24 for A.fib with RVR. Cardiology following, recommending follow up with OP CHEMO, medication compliance. Per chart review, pt has had several re-admissions d/t similar complaints. Chart review indicates pt is non-compliant with medical management and follow up appointments. At baseline pt resides alone in [facility] with 0 VLADIMIR. Pt typically (I) with ADLs and uses a quad cane or RW for mobility. Pt reports neighbor assist with medication management and assist her at home if needed. This date pt A&Ox4 on RA sitting up in bed upon arrival. Pt c/o no pain, no concerns to dc home at this time. Pt completes bed mobility with mod I, comes to standing with CGA and ambulates short household distance with CGA/SBA with RW. Pt dons socks sitting edge of bed with mod I. Pt appears to function near baseline, VSS throughout. Pt functioning at baseline level, no skilled OT intervention warranted within acute setting. Pt may benefit from transition to higher level of care to assist in IADLs and medical compliance.      Anticipated Discharge Disposition (OT): home with assist

## 2024-10-03 ENCOUNTER — HOME HEALTH ADMISSION (OUTPATIENT)
Dept: HOME HEALTH SERVICES | Facility: HOME HEALTHCARE | Age: 84
End: 2024-10-03
Payer: MEDICARE

## 2024-10-03 ENCOUNTER — TRANSITIONAL CARE MANAGEMENT TELEPHONE ENCOUNTER (OUTPATIENT)
Dept: CALL CENTER | Facility: HOSPITAL | Age: 84
End: 2024-10-03
Payer: MEDICARE

## 2024-10-03 NOTE — OUTREACH NOTE
Prep Survey      Flowsheet Row Responses   [facility] patient discharged from? [facility]   Is LACE score < 7 ? No   Eligibility [facility]   Date of Admission 09/30/24   Date of Discharge 10/02/24   Discharge Disposition Home-Health Care Svc   Discharge diagnosis Atrial fibrillation with RVR   Does the patient have one of the following disease processes/diagnoses(primary or secondary)? Other   Does the patient have Home health ordered? Yes   What is the Home health agency?  [facility]   Is there a DME ordered? No   Prep survey completed? Yes            Marina SCHWAB - Registered Nurse

## 2024-10-03 NOTE — CASE MANAGEMENT/SOCIAL WORK
Case Management Discharge Note      Final Note: Home with [facility]    Provided Post Acute Provider List?: Yes  Post Acute Provider List: Home Health  Provided Post Acute Provider Quality & Resource List?: Yes  Post Acute Provider Quality and Resource List: Home Health  Delivered To: Patient  Method of Delivery: In person    Selected Continued Care - Discharged on 10/2/2024 Admission date: 9/30/2024 - Discharge disposition: Home-Health Care [facility]          Home Medical Care Coordination complete.      Service Provider Selected Services Address Phone Fax Patient Preferred    [facility] [address] [facility] [place] [address] [phone] --                 Transportation Services  Private: Car    Final Discharge Disposition Code: 06 - home with home health care

## 2024-10-03 NOTE — OUTREACH NOTE
Call Center TCM Note      Flowsheet Row Responses   [facility] patient discharged from? [facility]   Does the patient have one of the following disease processes/diagnoses(primary or secondary)? Other   TCM attempt successful? No   Unsuccessful attempts Attempt 1            Alexa Duran RN    10/3/2024, 10:27 EDT

## 2024-10-03 NOTE — OUTREACH NOTE
Call Center TCM Note      Flowsheet Row Responses   [facility] patient discharged from? [facility]   Does the patient have one of the following disease processes/diagnoses(primary or secondary)? Other   TCM attempt successful? No   Unsuccessful attempts Attempt 2            Alexa Duran RN    10/3/2024, 13:07 EDT

## 2024-10-04 ENCOUNTER — TRANSITIONAL CARE MANAGEMENT TELEPHONE ENCOUNTER (OUTPATIENT)
Dept: CALL CENTER | Facility: HOSPITAL | Age: 84
End: 2024-10-04
Payer: MEDICARE

## 2024-10-04 ENCOUNTER — DOCUMENTATION (OUTPATIENT)
Dept: HOME HEALTH SERVICES | Facility: HOME HEALTHCARE | Age: 84
End: 2024-10-04
Payer: MEDICARE

## 2024-10-04 ENCOUNTER — TELEPHONE (OUTPATIENT)
Dept: FAMILY MEDICINE CLINIC | Facility: CLINIC | Age: 84
End: 2024-10-04
Payer: MEDICARE

## 2024-10-04 RX ORDER — METOPROLOL SUCCINATE 50 MG/1
50 TABLET, EXTENDED RELEASE ORAL 2 TIMES DAILY
Qty: 60 TABLET | Refills: 0 | Status: SHIPPED | OUTPATIENT
Start: 2024-10-04 | End: 2024-11-03

## 2024-10-04 RX ORDER — DIGOXIN 250 MCG
250 TABLET ORAL DAILY
Qty: 30 TABLET | Refills: 0 | Status: SHIPPED | OUTPATIENT
Start: 2024-10-04 | End: 2024-11-03

## 2024-10-04 NOTE — PROGRESS NOTES
Patient was not suppose to be discharged home. Was suppose to be admitted to [facility] from ER. Spoke with  HH and informed.

## 2024-10-04 NOTE — TELEPHONE ENCOUNTER
Called and spoke with Howard Ramsey for  HH and advised would like for them to take on board. Stated she would check with director if able to take on patient.

## 2024-10-04 NOTE — TELEPHONE ENCOUNTER
[facility] called back into office stating their director declines patient re-admittance d/t too high risk of patient.    Patient has neighbor Jazz whom is suppose to follow up with patient this weekend. Patient is scheduled to see Liv on Monday 10/7

## 2024-10-04 NOTE — TELEPHONE ENCOUNTER
Noted. If [facility] will still admit next week, keep them on board and Liv can assist in coordinating longer term plan of care.

## 2024-10-04 NOTE — TELEPHONE ENCOUNTER
Patient was released from [facility] on 10/02/2024 to home. Admission plan per documentation was to be discharged to a long term care facility. Hospital placed order for HH with  [facility] .     Called neighbor Jazz Eldridge. Went to home to see patient. Patient is ok. Does not have medications that were prescribed at discharge. She is needing them sent to [facility] in [place] and will  for patient.     Also spoke with [facility] whom previously saw patient prior to admission. They were also aware that patient was to be sent to long term care facility at discharge. She was discharged from their care on Monday. Per Can with [facility], will take patient back into care and will send someone to home either later tonight or tomorrow morning.

## 2024-10-04 NOTE — OUTREACH NOTE
Call Center TCM Note      Flowsheet Row Responses   [facility] facility patient discharged from? [facility]   Does the patient have one of the following disease processes/diagnoses(primary or secondary)? Other   TCM attempt successful? No  [VR-Vesna Cry-Neighbor]   Unsuccessful attempts Attempt 3            Adrienne Astorga RN    10/4/2024, 08:21 EDT

## 2024-10-04 NOTE — PROGRESS NOTES
Received a call from Tonya with [facility] office. After discussing pt case it appears pt is not appropriate for home health requiring more services than we can provide.   We are unable to contact pt. [facility] will not be seeing this pt. Providers office will contact us if needed.

## 2024-10-08 LAB
QT INTERVAL: 374 MS
QTC INTERVAL: 464 MS

## 2024-10-28 RX ORDER — LEVOTHYROXINE SODIUM 25 UG/1
25 TABLET ORAL DAILY
Qty: 30 TABLET | Refills: 0 | OUTPATIENT
Start: 2024-10-28

## 2024-10-30 ENCOUNTER — TELEPHONE (OUTPATIENT)
Dept: FAMILY MEDICINE CLINIC | Facility: CLINIC | Age: 84
End: 2024-10-30

## 2024-10-30 NOTE — TELEPHONE ENCOUNTER
Hub staff attempted to follow warm transfer process and was unsuccessful     Caller: Catalina Moreno    Relationship to patient: Self    Best call back number:     [phone]       Patient is needing: PATIENT WAS RETURNING CALL.

## 2024-10-31 RX ORDER — PANTOPRAZOLE SODIUM 40 MG/1
40 TABLET, DELAYED RELEASE ORAL DAILY
Qty: 90 TABLET | Refills: 1 | Status: SHIPPED | OUTPATIENT
Start: 2024-10-31

## 2024-11-01 NOTE — TELEPHONE ENCOUNTER
Spoke with Catalina. Informed that Liv is concerned for her safety since she is unable to obtain the support she needs. Informed that Liv would recommend her go to the hospital for another 72 hour stay and go to a long term care facility. Patient declined stating she does not want to leave her home. Discussed inability to get medications and being high risk. Went over her medications with her to verify accuracy. Scheduled for a visit with Liv on Monday. Verified with neighbor Evita that she is able to bring her into the office. Catalina agreed to appt and informed to bring pill bottles with her to appointment as well.

## 2024-11-04 ENCOUNTER — OFFICE VISIT (OUTPATIENT)
Dept: FAMILY MEDICINE CLINIC | Facility: CLINIC | Age: 84
End: 2024-11-04
Payer: MEDICARE

## 2024-11-04 VITALS
WEIGHT: 161.2 LBS | HEART RATE: 89 BPM | HEIGHT: 64 IN | DIASTOLIC BLOOD PRESSURE: 91 MMHG | SYSTOLIC BLOOD PRESSURE: 145 MMHG | BODY MASS INDEX: 27.52 KG/M2 | OXYGEN SATURATION: 95 %

## 2024-11-04 DIAGNOSIS — E03.9 ACQUIRED HYPOTHYROIDISM: ICD-10-CM

## 2024-11-04 DIAGNOSIS — I10 ESSENTIAL (PRIMARY) HYPERTENSION: ICD-10-CM

## 2024-11-04 DIAGNOSIS — I48.0 PAROXYSMAL ATRIAL FIBRILLATION: Chronic | ICD-10-CM

## 2024-11-04 DIAGNOSIS — R53.1 GENERALIZED WEAKNESS: ICD-10-CM

## 2024-11-04 DIAGNOSIS — R41.81 AGE-RELATED COGNITIVE DECLINE: ICD-10-CM

## 2024-11-04 DIAGNOSIS — Z91.81 AT RISK FOR FALLS: ICD-10-CM

## 2024-11-04 DIAGNOSIS — F01.B0 MODERATE VASCULAR DEMENTIA WITHOUT BEHAVIORAL DISTURBANCE, PSYCHOTIC DISTURBANCE, MOOD DISTURBANCE, OR ANXIETY: Primary | ICD-10-CM

## 2024-11-04 DIAGNOSIS — R26.89 BALANCE PROBLEM: ICD-10-CM

## 2024-11-04 DIAGNOSIS — R26.0 ATAXIC GAIT: ICD-10-CM

## 2024-11-04 DIAGNOSIS — Z95.0 PRESENCE OF CARDIAC PACEMAKER: Chronic | ICD-10-CM

## 2024-11-04 RX ORDER — DILTIAZEM HYDROCHLORIDE 30 MG/1
TABLET, FILM COATED ORAL
COMMUNITY
Start: 2024-10-28

## 2024-11-04 RX ORDER — DIGOXIN 250 MCG
250 TABLET ORAL DAILY
Qty: 90 TABLET | Refills: 1 | Status: SHIPPED | OUTPATIENT
Start: 2024-11-04 | End: 2024-12-04

## 2024-11-04 RX ORDER — SPIRONOLACTONE 25 MG/1
25 TABLET ORAL DAILY PRN
Qty: 90 TABLET | Refills: 1 | Status: SHIPPED | OUTPATIENT
Start: 2024-11-04

## 2024-11-04 RX ORDER — METOPROLOL SUCCINATE 50 MG/1
50 TABLET, EXTENDED RELEASE ORAL 2 TIMES DAILY
Qty: 180 TABLET | Refills: 1 | Status: SHIPPED | OUTPATIENT
Start: 2024-11-04 | End: 2024-12-04

## 2024-11-04 NOTE — PROGRESS NOTES
"Chief Complaint  Chief Complaint   Patient presents with    Follow-up     Discuss care          Subjective          Catalina Moreno presents to [facility] for   History of Present Illness    Patient with CHF, A-fib, SSS, PPM in place, chronic low back pain, iron deficiency anemia, hypothyroidism, dementia presents today with her neighbor Shwetha to discuss medication management and living situation. Patient lives alone, has a hx of falling-denies recent falls. She has had multiple hospitalizations in the last year.  She does not have any family members local to help her and neighbor Maria G has stepped in to assist but she lives her own life and can not be with pt 24/7. Pt was referred to PT, skilled nursing and  but was discharged d/t not answering the door.   -she states someone is trying to sell her home. Maria G states pt's home sold 5 years ago but pt can live there until she dies. Pt does not remember this.   -maria g found her walking down the street alone  -she has a life alert but has to pay for it and sending it back  -she does not often answer her phone  -maria g does check on her 2-3 times/week  -pt has been recommended multiple times to look into [facility]  -I sent a police welfare check recently d/t not answering HH or phone calls, she states she is scared to answer the door for strangers but talked to police.     She is weak with memory decline, see ACE mini.  Assisted living facility was recommended to patient at the last visit and she was recommended to contact [employer] for home health aide and other assistance but has not.   CT head/brain 7/24/24  IMPRESSION:   1. Age-appropriate atrophy with mild chronic periventricular ischemic changes.  2. No acute intracranial findings     Home sleep study was ordered to evaluate need for oxygen through [employer], she received the \"bracelet\" but Shwetha states pt lost it in her house and has not completed test. She is " forgetting to eat, misplacing things, her mother had alzheimers.           The following portions of the patient's history were reviewed and updated as appropriate: allergies, current medications, past family history, past medical history, past social history, past surgical history and problem list.    Past Medical History:   Diagnosis Date    Acquired spondylolisthesis of lumbosacral region     Acute kidney injury 07/22/2022    Anxiety     Arthritis     Atrial fibrillation     Broken shoulder     Chronic pain disorder     Chronic systolic CHF (congestive heart failure) 01/05/2023    Coronary artery disease involving native coronary artery of native heart without angina pectoris 04/02/2021    DDD (degenerative disc disease), cervical     DDD (degenerative disc disease), lumbar     Depression     Edema     GERD (gastroesophageal reflux disease)     H/O fall     Heart failure with mid-range ejection fraction 03/05/2022    Hypertension     Hypothyroidism     Insomnia     Low back pain     Moderate mitral regurgitation 01/05/2023    Neuropathy     Nonrheumatic tricuspid valve regurgitation     Pain in both feet     Polypharmacy     PONV (postoperative nausea and vomiting)     Pulmonary hypertension     Radiculopathy     Restless legs     Sacroiliitis     Sick sinus syndrome     Spondylolisthesis     Stage 3a chronic kidney disease     Urinary incontinence     Vitamin D deficiency      Past Surgical History:   Procedure Laterality Date    BACK SURGERY  07/19/2018    PDC &  PSF L3-L5 insitu    CARDIAC CATHETERIZATION N/A 4/2/2021    Procedure: Cardiac Catheterization/Vascular Study;  Surgeon: Barrett Evans MD;  Location: [facility] CATH INVASIVE LOCATION;  Service: Cardiovascular;  Laterality: N/A;    CARDIAC ELECTROPHYSIOLOGY PROCEDURE N/A 1/17/2023    Procedure: Pacemaker DC new;  Surgeon: Baljeet Valero MD;  Location: [facility] CATH INVASIVE LOCATION;  Service: Cardiovascular;  Laterality: N/A;     CHOLECYSTECTOMY      COLONOSCOPY      COLONOSCOPY N/A 7/10/2024    Procedure: COLONOSCOPY with cold snare polypectomy x1 and endoscopic clipping x2;  Surgeon: MARLIN Vanegas MD;  [facility];  Service: Gastroenterology;  Laterality: N/A;  Post- colon polyps, diverticulosis    ENDOSCOPY N/A 9/14/2023    Procedure: ESOPHAGOGASTRODUODENOSCOPY;  Surgeon: Ulysses Lamas MD;  [facility];  Service: Gastroenterology;  Laterality: N/A;  gastritis, inflammatory gastric polyp    ENDOSCOPY N/A 7/10/2024    Procedure: ESOPHAGOGASTRODUODENOSCOPY;  Surgeon: MARLIN Vanegas MD;  [facility];  Service: Gastroenterology;  Laterality: N/A;  Post- hiatal hernia, gastric polyps, gastritis    HYSTERECTOMY      ROTATOR CUFF REPAIR Left      Family History   Problem Relation Age of Onset    Cancer Father     Diabetes Son     Cancer Paternal Aunt     Heart disease Paternal Aunt     Cancer Paternal Uncle      Social History     Tobacco Use    Smoking status: Never     Passive exposure: Never    Smokeless tobacco: Never   Substance Use Topics    Alcohol use: No       Current Outpatient Medications:     apixaban (ELIQUIS) 5 MG tablet tablet, Take 1 tablet by mouth Every 12 (Twelve) Hours. Indications: Atrial Fibrillation, Disp: 180 tablet, Rfl: 1    digoxin (LANOXIN) 250 MCG tablet, Take 1 tablet by mouth Daily for 30 days., Disp: 90 tablet, Rfl: 1    dilTIAZem (CARDIZEM) 30 MG tablet, , Disp: , Rfl:     ferrous gluconate (FERGON) 324 MG tablet, Take 1 tablet by mouth Every Other Day., Disp: 45 tablet, Rfl: 1    levothyroxine (SYNTHROID, LEVOTHROID) 50 MCG tablet, Take 1 tablet by mouth Daily., Disp: 90 tablet, Rfl: 1    metoprolol succinate XL (TOPROL-XL) 50 MG 24 hr tablet, Take 1 tablet by mouth 2 (Two) Times a Day for 30 days., Disp: 180 tablet, Rfl: 1    pantoprazole (PROTONIX) 40 MG EC tablet, Take 1 tablet by mouth Daily., Disp: 90 tablet, Rfl: 1    spironolactone (ALDACTONE)  "25 MG tablet, Take 1 tablet by mouth Daily As Needed (swelling)., Disp: 90 tablet, Rfl: 1    Objective   Vital Signs:   /91 (BP Location: Left arm, Patient Position: Sitting, Cuff Size: Adult)   Pulse 89   Ht 162.6 cm (64\")   Wt 73.1 kg (161 lb 3.2 oz)   SpO2 95%   BMI 27.67 kg/m²           Physical Exam  Constitutional:       General: She is not in acute distress.     Appearance: Normal appearance. She is well-developed. She is ill-appearing (chronic). She is not diaphoretic.   HENT:      Head: Normocephalic.   Eyes:      Conjunctiva/sclera: Conjunctivae normal.      Pupils: Pupils are equal, round, and reactive to light.   Neck:      Thyroid: No thyromegaly.      Vascular: No JVD.   Cardiovascular:      Rate and Rhythm: Normal rate. Rhythm irregular.      Heart sounds: Normal heart sounds. No murmur heard.  Pulmonary:      Effort: Pulmonary effort is normal. No respiratory distress.      Breath sounds: Normal breath sounds. No wheezing or rhonchi.   Abdominal:      General: Bowel sounds are normal. There is no distension.      Palpations: Abdomen is soft.      Tenderness: There is no abdominal tenderness.   Musculoskeletal:         General: Swelling (trace pitting BLE) present. No tenderness. Normal range of motion.      Cervical back: Normal range of motion and neck supple. No tenderness.   Lymphadenopathy:      Cervical: No cervical adenopathy.   Skin:     General: Skin is warm and dry.      Coloration: Skin is pale. Skin is not jaundiced.      Findings: No erythema or rash.   Neurological:      General: No focal deficit present.      Mental Status: She is alert and oriented to person, place, and time. Mental status is at baseline.      Sensory: No sensory deficit.      Motor: Weakness present.      Gait: Gait abnormal (Using cane today for mobility).   Psychiatric:         Attention and Perception: Attention normal.         Mood and Affect: Mood normal. Mood is not anxious or depressed.         " Behavior: Behavior normal. Behavior is cooperative.         Thought Content: Thought content normal.         Cognition and Memory: Memory is impaired. She exhibits impaired recent memory.         Judgment: Judgment normal. Judgment is inappropriate.          Result Review :     No visits with results within 7 Day(s) from this visit.   Latest known visit with results is:   Admission on 09/30/2024, Discharged on 10/02/2024   Component Date Value Ref Range Status    QT Interval 09/30/2024 449  ms Final    QTC Interval 09/30/2024 535  ms Final    Glucose 09/30/2024 98  65 - 99 mg/dL Final    BUN 09/30/2024 18  8 - 23 mg/dL Final    Creatinine 09/30/2024 0.93  0.57 - 1.00 mg/dL Final    Sodium 09/30/2024 141  136 - 145 mmol/L Final    Potassium 09/30/2024 4.0  3.5 - 5.2 mmol/L Final    Chloride 09/30/2024 106  98 - 107 mmol/L Final    CO2 09/30/2024 24.5  22.0 - 29.0 mmol/L Final    Calcium 09/30/2024 8.9  8.6 - 10.5 mg/dL Final    Total Protein 09/30/2024 6.7  6.0 - 8.5 g/dL Final    Albumin 09/30/2024 4.2  3.5 - 5.2 g/dL Final    ALT (SGPT) 09/30/2024 <5  1 - 33 U/L Final    AST (SGOT) 09/30/2024 15  1 - 32 U/L Final    Alkaline Phosphatase 09/30/2024 63  39 - 117 U/L Final    Total Bilirubin 09/30/2024 1.2  0.0 - 1.2 mg/dL Final    Globulin 09/30/2024 2.5  gm/dL Final    A/G Ratio 09/30/2024 1.7  g/dL Final    BUN/Creatinine Ratio 09/30/2024 19.4  7.0 - 25.0 Final    Anion Gap 09/30/2024 10.5  5.0 - 15.0 mmol/L Final    eGFR 09/30/2024 61.1  >60.0 mL/min/1.73 Final    HS Troponin T 09/30/2024 9  <14 ng/L Final    Extra Tube 09/30/2024 Hold for add-ons.   Final    Auto resulted.    Extra Tube 09/30/2024 hold for add-on   Final    Auto resulted    Extra Tube 09/30/2024 Hold for add-ons.   Final    Auto resulted.    Extra Tube 09/30/2024 Hold for add-ons.   Final    Auto resulted    WBC 09/30/2024 6.27  3.40 - 10.80 10*3/mm3 Final    RBC 09/30/2024 4.77  3.77 - 5.28 10*6/mm3 Final    Hemoglobin 09/30/2024 13.9  12.0 -  15.9 g/dL Final    Hematocrit 09/30/2024 43.7  34.0 - 46.6 % Final    MCV 09/30/2024 91.6  79.0 - 97.0 fL Final    MCH 09/30/2024 29.1  26.6 - 33.0 pg Final    MCHC 09/30/2024 31.8  31.5 - 35.7 g/dL Final    RDW 09/30/2024 18.0 (H)  12.3 - 15.4 % Final    RDW-SD 09/30/2024 61.6 (H)  37.0 - 54.0 fl Final    MPV 09/30/2024 10.3  6.0 - 12.0 fL Final    Platelets 09/30/2024 267  140 - 450 10*3/mm3 Final    Neutrophil % 09/30/2024 64.7  42.7 - 76.0 % Final    Lymphocyte % 09/30/2024 25.8  19.6 - 45.3 % Final    Monocyte % 09/30/2024 7.2  5.0 - 12.0 % Final    Eosinophil % 09/30/2024 1.3  0.3 - 6.2 % Final    Basophil % 09/30/2024 0.8  0.0 - 1.5 % Final    Immature Grans % 09/30/2024 0.2  0.0 - 0.5 % Final    Neutrophils, Absolute 09/30/2024 4.06  1.70 - 7.00 10*3/mm3 Final    Lymphocytes, Absolute 09/30/2024 1.62  0.70 - 3.10 10*3/mm3 Final    Monocytes, Absolute 09/30/2024 0.45  0.10 - 0.90 10*3/mm3 Final    Eosinophils, Absolute 09/30/2024 0.08  0.00 - 0.40 10*3/mm3 Final    Basophils, Absolute 09/30/2024 0.05  0.00 - 0.20 10*3/mm3 Final    Immature Grans, Absolute 09/30/2024 0.01  0.00 - 0.05 10*3/mm3 Final    nRBC 09/30/2024 0.0  0.0 - 0.2 /100 WBC Final    Magnesium 09/30/2024 2.1  1.6 - 2.4 mg/dL Final    PTT 09/30/2024 29.7 (L)  61.0 - 76.5 seconds Final    Protime 09/30/2024 11.4  9.6 - 11.7 Seconds Final    INR 09/30/2024 1.05  0.93 - 1.10 Final    HS Troponin T 09/30/2024 7  <14 ng/L Final    Troponin T Delta 09/30/2024 -2  >=-4 - <+4 ng/L Final    proBNP 09/30/2024 1,449.0  0.0 - 1,800.0 pg/mL Final    TSH 10/01/2024 13.900 (H)  0.270 - 4.200 uIU/mL Final    QT Interval 10/01/2024 374  ms Final    QTC Interval 10/01/2024 464  ms Final    HS Troponin T 10/01/2024 12  <14 ng/L Final    Free T4 10/01/2024 1.20  0.93 - 1.70 ng/dL Final    T3, Free 09/30/2024 1.67 (L)  2.00 - 4.40 pg/mL Final    Phosphorus 10/02/2024 3.6  2.5 - 4.5 mg/dL Final    Magnesium 10/02/2024 2.1  1.6 - 2.4 mg/dL Final    Glucose 10/02/2024  105 (H)  65 - 99 mg/dL Final    BUN 10/02/2024 19  8 - 23 mg/dL Final    Creatinine 10/02/2024 0.99  0.57 - 1.00 mg/dL Final    Sodium 10/02/2024 139  136 - 145 mmol/L Final    Potassium 10/02/2024 4.5  3.5 - 5.2 mmol/L Final    Slight hemolysis detected by analyzer. Result may be falsely elevated.    Chloride 10/02/2024 105  98 - 107 mmol/L Final    CO2 10/02/2024 24.7  22.0 - 29.0 mmol/L Final    Calcium 10/02/2024 9.0  8.6 - 10.5 mg/dL Final    Total Protein 10/02/2024 6.6  6.0 - 8.5 g/dL Final    Albumin 10/02/2024 4.0  3.5 - 5.2 g/dL Final    ALT (SGPT) 10/02/2024 <5  1 - 33 U/L Final    AST (SGOT) 10/02/2024 16  1 - 32 U/L Final    Alkaline Phosphatase 10/02/2024 62  39 - 117 U/L Final    Total Bilirubin 10/02/2024 0.7  0.0 - 1.2 mg/dL Final    Globulin 10/02/2024 2.6  gm/dL Final    A/G Ratio 10/02/2024 1.5  g/dL Final    BUN/Creatinine Ratio 10/02/2024 19.2  7.0 - 25.0 Final    Anion Gap 10/02/2024 9.3  5.0 - 15.0 mmol/L Final    eGFR 10/02/2024 56.7 (L)  >60.0 mL/min/1.73 Final    WBC 10/02/2024 5.42  3.40 - 10.80 10*3/mm3 Final    RBC 10/02/2024 4.24  3.77 - 5.28 10*6/mm3 Final    Hemoglobin 10/02/2024 12.7  12.0 - 15.9 g/dL Final    Hematocrit 10/02/2024 39.5  34.0 - 46.6 % Final    MCV 10/02/2024 93.2  79.0 - 97.0 fL Final    MCH 10/02/2024 30.0  26.6 - 33.0 pg Final    MCHC 10/02/2024 32.2  31.5 - 35.7 g/dL Final    RDW 10/02/2024 17.9 (H)  12.3 - 15.4 % Final    RDW-SD 10/02/2024 61.9 (H)  37.0 - 54.0 fl Final    MPV 10/02/2024 9.4  6.0 - 12.0 fL Final    Platelets 10/02/2024 217  140 - 450 10*3/mm3 Final    Neutrophil % 10/02/2024 46.6  42.7 - 76.0 % Final    Lymphocyte % 10/02/2024 41.0  19.6 - 45.3 % Final    Monocyte % 10/02/2024 8.3  5.0 - 12.0 % Final    Eosinophil % 10/02/2024 2.8  0.3 - 6.2 % Final    Basophil % 10/02/2024 1.1  0.0 - 1.5 % Final    Immature Grans % 10/02/2024 0.2  0.0 - 0.5 % Final    Neutrophils, Absolute 10/02/2024 2.53  1.70 - 7.00 10*3/mm3 Final    Lymphocytes, Absolute  10/02/2024 2.22  0.70 - 3.10 10*3/mm3 Final    Monocytes, Absolute 10/02/2024 0.45  0.10 - 0.90 10*3/mm3 Final    Eosinophils, Absolute 10/02/2024 0.15  0.00 - 0.40 10*3/mm3 Final    Basophils, Absolute 10/02/2024 0.06  0.00 - 0.20 10*3/mm3 Final    Immature Grans, Absolute 10/02/2024 0.01  0.00 - 0.05 10*3/mm3 Final    nRBC 10/02/2024 0.0  0.0 - 0.2 /100 WBC Final                              Assessment and Plan    Diagnoses and all orders for this visit:    1. 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 At risk for falls    3. Ataxic gait    4. Balance problem    5. Age-related cognitive decline    6. Essential (primary) hypertension    7. Paroxysmal atrial fibrillation    8. Acquired hypothyroidism    9. Generalized weakness    10. Presence of cardiac pacemaker    Other orders  -     metoprolol succinate XL (TOPROL-XL) 50 MG 24 hr tablet; Take 1 tablet by mouth 2 (Two) Times a Day for 30 days.  Dispense: 180 tablet; Refill: 1  -     digoxin (LANOXIN) 250 MCG tablet; Take 1 tablet by mouth Daily for 30 days.  Dispense: 90 tablet; Refill: 1  -     spironolactone (ALDACTONE) 25 MG tablet; Take 1 tablet by mouth Daily As Needed (swelling).  Dispense: 90 tablet; Refill: 1      -Discussed in length again with pt and evita to consider longterm for pt's safety, pt has progressive dementia, she should not be living alone. I have ordered home health, social service consults recently and she would not answer door, was discharged. I had to call for police welfare check d/t no contact for days.   -The patient declines longterm, I can only strongly recommend she enquire.   -Evita states she will check on pt 2-3 times/week  -Recommend again they contact [facility] for home health aid and services.       I spent 45 minutes caring for Catalina Moreno on this date of service. This time includes time spent by me in the following activities:  preparing for the visit, reviewing tests, performing a medically appropriate examination and/or evaluation , counseling and educating the patient/family/caregiver, ordering medications, tests, or procedures and documenting information in the medical record        Follow Up     Return in about 3 months (around 2/4/2025) for Recheck.  Patient was given instructions and counseling regarding her condition or for health maintenance advice. Please see specific information pulled into the AVS if appropriate.        Part of this note may be an electronic transcription/translation of spoken language to printed text using the Dragon Dictation System

## 2024-11-19 ENCOUNTER — TELEPHONE (OUTPATIENT)
Dept: FAMILY MEDICINE CLINIC | Facility: CLINIC | Age: 84
End: 2024-11-19

## 2024-11-19 NOTE — TELEPHONE ENCOUNTER
Caller: Catalina Moreno    Relationship: Self    Best call back number: [phone]     What is the best time to reach you: ANY    Who are you requesting to speak with (clinical staff, provider,  specific staff member): PCP    Do you know the name of the person who called:     What was the call regarding: FEET NUMBNESS AND PAIN. PATIENT NOT SURE WHAT TO DO. PAIN AND NUMBNESS IS GETTING  WORSE.    Is it okay if the provider responds through MyChart:

## 2024-12-02 DIAGNOSIS — E03.9 ACQUIRED HYPOTHYROIDISM: ICD-10-CM

## 2024-12-02 RX ORDER — LEVOTHYROXINE SODIUM 50 UG/1
50 TABLET ORAL DAILY
Qty: 30 TABLET | Refills: 3 | Status: SHIPPED | OUTPATIENT
Start: 2024-12-02

## 2024-12-10 ENCOUNTER — ANESTHESIA EVENT (OUTPATIENT)
Dept: CARDIOLOGY | Facility: HOSPITAL | Age: 84
End: 2024-12-10
Payer: MEDICARE

## 2024-12-10 NOTE — ANESTHESIA PREPROCEDURE EVALUATION
Anesthesia Evaluation     Patient summary reviewed and Nursing notes reviewed   history of anesthetic complications:  PONV  NPO Solid Status: > 8 hours  NPO Liquid Status: > 8 hours           Airway   Mallampati: II  TM distance: >3 FB  Neck ROM: full  No difficulty expected  Dental - normal exam   (+) edentulous    Pulmonary - normal exam   Cardiovascular - normal exam    ECG reviewed    (+) pacemaker pacemaker, hypertension, valvular problems/murmurs MR and TI, CAD, dysrhythmias, CHF       Neuro/Psych  (+) weakness, numbness, psychiatric history  GI/Hepatic/Renal/Endo    (+) GERD, renal disease-, thyroid problem     Musculoskeletal     (+) myalgias  Abdominal  - normal exam    Bowel sounds: normal.   Substance History      OB/GYN          Other   arthritis,     ROS/Med Hx Other: AF     7/24  ·  Left ventricular ejection fraction appears to be 46 - 50%.  ·  Left ventricular wall thickness is consistent with moderate concentric hypertrophy.  ·  The left atrial cavity is severely dilated.  ·  The right atrial cavity is moderate to severely  dilated.  ·  Abnormal mitral valve structure consistent with dilated annulus.  ·  Moderate to severe mitral valve regurgitation is present with a centrally-directed jet noted.  ·  Moderate to severe tricuspid valve regurgitation is present.  ·  Estimated right ventricular systolic pressure from tricuspid regurgitation is moderately elevated (45-55 mmHg).    Assessment and Plan    Diagnoses and all orders for this visit:     1. 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 At risk for falls     3. Ataxic gait     4. Balance problem     5. Age-related cognitive decline     6. Essential (primary) hypertension     7. Paroxysmal atrial fibrillation     8. Acquired hypothyroidism     9. Generalized weakness     10. Presence of cardiac pacemaker     Other orders  -     metoprolol succinate XL (TOPROL-XL)  50 MG 24 hr tablet; Take 1 tablet by mouth 2 (Two) Times a Day for 30 days.  Dispense: 180 tablet; Refill: 1  -     digoxin (LANOXIN) 250 MCG tablet; Take 1 tablet by mouth Daily for 30 days.  Dispense: 90 tablet; Refill: 1  -     spironolactone (ALDACTONE) 25 MG tablet; Take 1 tablet by mouth Daily As Needed (swelling).  Dispense: 90 tablet; Refill: 1        -Discussed in length again with pt and evita to consider penitentiary for pt's safety, pt has progressive dementia, she should not be living alone. I have ordered home health, social service consults recently and she would not answer door, was discharged. I had to call for police welfare check d/t no contact for days.   -The patient declines penitentiary, I can only strongly recommend she enquire.   -Evita states she will check on pt 2-3 times/week  -Recommend again they contact [facility] resources for home health aid and services.        2021  3. CORONARY ANGIOGRAPHY:   Dominance right                Left Main: Large-caliber vessel bifurcates into LAD circumflex artery 0% stenosis                LAD: Large-caliber vessel gives rise to couple of medium caliber diagonal arteries multiple septal branches reaches the apex mild luminal irregularities noted less than 10% stenosis                CIRC: Large-caliber vessel gives rise to marginal lateral branches less than 10% stenosis                RCA: Large-caliber dominant artery gives rise to PDA and PL branches less than 5 to 10% stenosis     SUMMARY: No obstructive coronary artery disease                      Anesthesia Plan    ASA 4     general   total IV anesthesia  intravenous induction     Anesthetic plan, risks, benefits, and alternatives have been provided, discussed and informed consent has been obtained with: patient.    Plan discussed with CRNA.      CODE STATUS:

## 2024-12-11 ENCOUNTER — ANESTHESIA (OUTPATIENT)
Dept: CARDIOLOGY | Facility: HOSPITAL | Age: 84
End: 2024-12-11
Payer: MEDICARE

## 2025-01-14 DIAGNOSIS — I48.91 ATRIAL FIBRILLATION WITH RVR: ICD-10-CM

## 2025-01-14 DIAGNOSIS — E03.9 ACQUIRED HYPOTHYROIDISM: ICD-10-CM

## 2025-01-14 DIAGNOSIS — D50.8 OTHER IRON DEFICIENCY ANEMIA: ICD-10-CM

## 2025-01-14 RX ORDER — LEVOTHYROXINE SODIUM 50 UG/1
50 TABLET ORAL DAILY
Qty: 90 TABLET | Refills: 1 | Status: SHIPPED | OUTPATIENT
Start: 2025-01-14

## 2025-01-14 RX ORDER — PANTOPRAZOLE SODIUM 40 MG/1
40 TABLET, DELAYED RELEASE ORAL DAILY
Qty: 90 TABLET | Refills: 1 | Status: SHIPPED | OUTPATIENT
Start: 2025-01-14

## 2025-01-14 RX ORDER — SPIRONOLACTONE 25 MG/1
25 TABLET ORAL DAILY PRN
Qty: 90 TABLET | Refills: 1 | Status: SHIPPED | OUTPATIENT
Start: 2025-01-14

## 2025-01-14 RX ORDER — FERROUS GLUCONATE 324(38)MG
324 TABLET ORAL EVERY OTHER DAY
Qty: 45 TABLET | Refills: 1 | Status: SHIPPED | OUTPATIENT
Start: 2025-01-14

## 2025-01-15 RX ORDER — PANTOPRAZOLE SODIUM 40 MG/1
40 TABLET, DELAYED RELEASE ORAL DAILY
Qty: 90 TABLET | Refills: 1 | OUTPATIENT
Start: 2025-01-15

## 2025-01-17 RX ORDER — SPIRONOLACTONE 25 MG/1
TABLET ORAL
Qty: 90 TABLET | Refills: 1 | OUTPATIENT
Start: 2025-01-17

## 2025-01-27 ENCOUNTER — LAB (OUTPATIENT)
Dept: LAB | Facility: HOSPITAL | Age: 85
End: 2025-01-27
Payer: MEDICARE

## 2025-01-27 LAB
ALBUMIN SERPL-MCNC: 4.3 G/DL (ref 3.5–5.2)
ALBUMIN/GLOB SERPL: 1.3 G/DL
ALP SERPL-CCNC: 80 U/L (ref 39–117)
ALT SERPL W P-5'-P-CCNC: <5 U/L (ref 1–33)
ANION GAP SERPL CALCULATED.3IONS-SCNC: 10.9 MMOL/L (ref 5–15)
AST SERPL-CCNC: 19 U/L (ref 1–32)
BASOPHILS # BLD AUTO: 0.05 10*3/MM3 (ref 0–0.2)
BASOPHILS NFR BLD AUTO: 0.7 % (ref 0–1.5)
BILIRUB SERPL-MCNC: 0.6 MG/DL (ref 0–1.2)
BUN SERPL-MCNC: 25 MG/DL (ref 8–23)
BUN/CREAT SERPL: 26 (ref 7–25)
CALCIUM SPEC-SCNC: 9.3 MG/DL (ref 8.6–10.5)
CHLORIDE SERPL-SCNC: 100 MMOL/L (ref 98–107)
CO2 SERPL-SCNC: 24.1 MMOL/L (ref 22–29)
CREAT SERPL-MCNC: 0.96 MG/DL (ref 0.57–1)
DEPRECATED RDW RBC AUTO: 50.4 FL (ref 37–54)
EGFRCR SERPLBLD CKD-EPI 2021: 58.8 ML/MIN/1.73
EOSINOPHIL # BLD AUTO: 0.13 10*3/MM3 (ref 0–0.4)
EOSINOPHIL NFR BLD AUTO: 1.9 % (ref 0.3–6.2)
ERYTHROCYTE [DISTWIDTH] IN BLOOD BY AUTOMATED COUNT: 14.1 % (ref 12.3–15.4)
GLOBULIN UR ELPH-MCNC: 3.3 GM/DL
GLUCOSE SERPL-MCNC: 92 MG/DL (ref 65–99)
HCT VFR BLD AUTO: 40.5 % (ref 34–46.6)
HGB BLD-MCNC: 13 G/DL (ref 12–15.9)
IMM GRANULOCYTES # BLD AUTO: 0.03 10*3/MM3 (ref 0–0.05)
IMM GRANULOCYTES NFR BLD AUTO: 0.4 % (ref 0–0.5)
LYMPHOCYTES # BLD AUTO: 2.28 10*3/MM3 (ref 0.7–3.1)
LYMPHOCYTES NFR BLD AUTO: 33.9 % (ref 19.6–45.3)
MCH RBC QN AUTO: 31.2 PG (ref 26.6–33)
MCHC RBC AUTO-ENTMCNC: 32.1 G/DL (ref 31.5–35.7)
MCV RBC AUTO: 97.1 FL (ref 79–97)
MONOCYTES # BLD AUTO: 0.53 10*3/MM3 (ref 0.1–0.9)
MONOCYTES NFR BLD AUTO: 7.9 % (ref 5–12)
NEUTROPHILS NFR BLD AUTO: 3.7 10*3/MM3 (ref 1.7–7)
NEUTROPHILS NFR BLD AUTO: 55.2 % (ref 42.7–76)
NRBC BLD AUTO-RTO: 0 /100 WBC (ref 0–0.2)
PLATELET # BLD AUTO: 240 10*3/MM3 (ref 140–450)
PMV BLD AUTO: 9.7 FL (ref 6–12)
POTASSIUM SERPL-SCNC: 4.9 MMOL/L (ref 3.5–5.2)
PROT SERPL-MCNC: 7.6 G/DL (ref 6–8.5)
RBC # BLD AUTO: 4.17 10*6/MM3 (ref 3.77–5.28)
SODIUM SERPL-SCNC: 135 MMOL/L (ref 136–145)
WBC NRBC COR # BLD AUTO: 6.72 10*3/MM3 (ref 3.4–10.8)

## 2025-01-27 PROCEDURE — 80053 COMPREHEN METABOLIC PANEL: CPT | Performed by: INTERNAL MEDICINE

## 2025-01-27 PROCEDURE — 85025 COMPLETE CBC W/AUTO DIFF WBC: CPT | Performed by: INTERNAL MEDICINE

## 2025-01-29 ENCOUNTER — HOSPITAL ENCOUNTER (OUTPATIENT)
Facility: HOSPITAL | Age: 85
Setting detail: HOSPITAL OUTPATIENT SURGERY
Discharge: HOME | End: 2025-01-29
Attending: INTERNAL MEDICINE | Admitting: INTERNAL MEDICINE
Payer: MEDICARE

## 2025-01-29 ENCOUNTER — ANESTHESIA (OUTPATIENT)
Dept: CARDIOLOGY | Facility: HOSPITAL | Age: 85
End: 2025-01-29
Payer: MEDICARE

## 2025-01-29 ENCOUNTER — HOSPITAL ENCOUNTER (OUTPATIENT)
Dept: CARDIOLOGY | Facility: HOSPITAL | Age: 85
Setting detail: HOSPITAL OUTPATIENT SURGERY
Discharge: HOME | End: 2025-01-29
Payer: MEDICARE

## 2025-01-29 ENCOUNTER — ANESTHESIA EVENT (OUTPATIENT)
Dept: CARDIOLOGY | Facility: HOSPITAL | Age: 85
End: 2025-01-29
Payer: MEDICARE

## 2025-01-29 VITALS
OXYGEN SATURATION: 95 % | SYSTOLIC BLOOD PRESSURE: 155 MMHG | DIASTOLIC BLOOD PRESSURE: 82 MMHG | TEMPERATURE: 97.6 F | RESPIRATION RATE: 18 BRPM | HEIGHT: 60 IN | HEART RATE: 95 BPM | WEIGHT: 162.7 LBS | BODY MASS INDEX: 31.94 KG/M2

## 2025-01-29 VITALS
DIASTOLIC BLOOD PRESSURE: 64 MMHG | RESPIRATION RATE: 18 BRPM | SYSTOLIC BLOOD PRESSURE: 127 MMHG | OXYGEN SATURATION: 94 % | HEART RATE: 79 BPM

## 2025-01-29 DIAGNOSIS — I50.22 CHRONIC HFREF (HEART FAILURE WITH REDUCED EJECTION FRACTION): Chronic | ICD-10-CM

## 2025-01-29 DIAGNOSIS — I10 ESSENTIAL (PRIMARY) HYPERTENSION: ICD-10-CM

## 2025-01-29 DIAGNOSIS — I10 ESSENTIAL (PRIMARY) HYPERTENSION: Chronic | ICD-10-CM

## 2025-01-29 DIAGNOSIS — I48.91 ATRIAL FIBRILLATION WITH RVR: ICD-10-CM

## 2025-01-29 DIAGNOSIS — Z95.0 PRESENCE OF CARDIAC PACEMAKER: Chronic | ICD-10-CM

## 2025-01-29 DIAGNOSIS — I48.0 PAROXYSMAL ATRIAL FIBRILLATION: ICD-10-CM

## 2025-01-29 DIAGNOSIS — I48.0 PAROXYSMAL ATRIAL FIBRILLATION: Chronic | ICD-10-CM

## 2025-01-29 DIAGNOSIS — Z95.0 PRESENCE OF CARDIAC PACEMAKER: ICD-10-CM

## 2025-01-29 DIAGNOSIS — I50.22 CHRONIC HFREF (HEART FAILURE WITH REDUCED EJECTION FRACTION): ICD-10-CM

## 2025-01-29 LAB
BH CV ECHO SHUNT ASSESSMENT PERFORMED (HIDDEN SCRIPTING): 1
LV EF 2D ECHO EST: 55 %

## 2025-01-29 PROCEDURE — 93325 DOPPLER ECHO COLOR FLOW MAPG: CPT

## 2025-01-29 PROCEDURE — 93312 ECHO TRANSESOPHAGEAL: CPT

## 2025-01-29 PROCEDURE — 25010000002 PROPOFOL 10 MG/ML EMULSION: Performed by: ANESTHESIOLOGIST ASSISTANT

## 2025-01-29 PROCEDURE — 25810000003 SODIUM CHLORIDE 0.9 % SOLUTION: Performed by: ANESTHESIOLOGIST ASSISTANT

## 2025-01-29 PROCEDURE — 93320 DOPPLER ECHO COMPLETE: CPT

## 2025-01-29 RX ORDER — METOPROLOL SUCCINATE 100 MG/1
100 TABLET, EXTENDED RELEASE ORAL DAILY
Qty: 30 TABLET | Refills: 2 | Status: SHIPPED | OUTPATIENT
Start: 2025-01-29

## 2025-01-29 RX ORDER — SODIUM CHLORIDE 9 MG/ML
INJECTION, SOLUTION INTRAVENOUS CONTINUOUS PRN
Status: DISCONTINUED | OUTPATIENT
Start: 2025-01-29 | End: 2025-01-29 | Stop reason: SURG

## 2025-01-29 RX ORDER — ERGOCALCIFEROL (VITAMIN D2) 10 MCG
400 TABLET ORAL DAILY
COMMUNITY

## 2025-01-29 RX ORDER — SPIRONOLACTONE 25 MG/1
25 TABLET ORAL DAILY
COMMUNITY

## 2025-01-29 RX ORDER — METOPROLOL SUCCINATE 25 MG/1
25 TABLET, EXTENDED RELEASE ORAL DAILY
Status: ON HOLD | COMMUNITY
End: 2025-01-29

## 2025-01-29 RX ADMIN — SODIUM CHLORIDE: 9 INJECTION, SOLUTION INTRAVENOUS at 14:16

## 2025-01-29 RX ADMIN — PROPOFOL 150 MCG/KG/MIN: 10 INJECTION, EMULSION INTRAVENOUS at 14:22

## 2025-01-29 NOTE — H&P
REASON  FOR  CONSULTATION  84-year-old female patient of Dr. Gonzalez with cardiac history of paroxysmal atrial fibrillation with elevated chads vascular score anticoagulated with Eliquis, history of nonobstructive coronary artery disease per heart cath performed 2021, history of chronic systolic heart failure with known LV systolic function and most recent EF 45% March 2022, history of valvular heart disease including moderate MR, history of sick sinus syndrome status post dual-chamber permanent pacemaker implant 1/17/2023 for Dr. Valero.  Additional history of primary hypertension, chronic pain disorder, peripheral neuropathy, stage IIIa chronic kidney disease, polypharmacy.     CC:  Shortness of breath  Lightheadedness     HPI:  Patient presented to the emergency department at [facility] 9/30/2024 with symptoms as above over the past 2 days, progressively worsening.  She denies any actual chest pain, pressure or tightness.  She denies any lower extremity edema, near syncopal or syncopal episodes.  In the ED, telemetry and EKG confirmed atrial fibrillation with rapid ventricular response.  She was given IV diltiazem bolus and started on a drip with improvement in rate which has been discontinued.  Patient reports she has missed some doses of her metoprolol because she has not yet picked up the prescription.  Upon my evaluation, she is laying flat in bed on her left side asleep with normal respiratory effort.  Rhythm is intermittently paced with underlying atrial fibrillation.  Blood pressure 101/58.  She has been evaluated by her primary cardiologist.  EP was consulted for further rhythm strategies.     REVIEW OF SYSTEMS:  Pertinent items are noted in HPI, all other systems reviewed and negative     OBJECTIVE   High-sensitivity troponin negative x 3  proBNP within normal limits  TSH 13.9 (11.4 on 8/1/2024)  Free T41.2     ASSESSMENT  Shortness of breath/lightheadedness  Atrial fibrillation with rapid  "ventricular response  Abnormal thyroid study  History of paroxysmal atrial fibrillation  Elevated chads vascular score  Coagulopathy on Eliquis  History of sick sinus syndrome status post dual-chamber ICD  History of nonobstructive coronary artery disease  History of HFmrEF  Nonobstructive coronary artery disease  Valvular heart disease: Mod-severe MR/TR  Hypertension with hypertensive heart disease     RECOMMENDATIONS  IV Cardizem has been discontinued once rate was better controlled.  Still with intermittent A-fib.  Continue BB  Treat underlying thyroid disorder  Continue Eliquis for stroke prevention from A-fib  Receiving Aldactone 25 mg daily  No evidence of acute coronary syndrome with negative serial cardiac enzymes  Further recommendations per electrophysiologist              CHF Guideline Directed Medical Therapy  Beta Blocker:   ARNI/ACE/ARB:   SGLT 2 inhibitors:   Diuretics:   Aldosterone Antagonist:   Vasodilators & Nitrates:         Vital Signs  Visit Vitals  /52   Pulse 84   Temp 97.7 °F (36.5 °C) (Oral)   Resp 14   Ht 162.6 cm (64\")   Wt 70.7 kg (155 lb 13.8 oz)   SpO2 96%   BMI 26.75 kg/m²      Oxygen Therapy  SpO2: 96 %  Pulse Oximetry Type: Continuous  Device (Oxygen Therapy): room air  Flowsheet Rows       Flowsheet Row First Filed Value   Admission Height 162.6 cm (64\") Documented at 09/30/2024 1440   Admission Weight 70.8 kg (156 lb) Documented at 09/30/2024 1440             Intake & Output (last 3 days)           09/28 0701  09/29 0700 09/29 0701  09/30 0700 09/30 0701  10/01 0700 10/01 0701  10/02 0700     P.O.     480 240     Total Intake(mL/kg)     480 (6.8) 240 (3.4)     Net     +480 +240                   Urine Unmeasured Occurrence     1 x               Lines, Drains & Airways         Active LDAs         Name Placement date Placement time Site Days     Peripheral IV 09/30/24 1454 Right Antecubital 09/30/24  1454  Antecubital  less than 1                          MEDICAL HISTORY  "   Medical History        Past Medical History:   Diagnosis Date    Acquired spondylolisthesis of lumbosacral region      Acute kidney injury 07/22/2022    Anxiety      Arthritis      Atrial fibrillation       paroxysmal    Broken shoulder       left-- due to fall 11-7-19 was at [facility]    Chronic pain disorder      Chronic systolic CHF (congestive heart failure) 01/05/2023     EF 36-40%    Coronary artery disease involving native coronary artery of native heart without angina pectoris 04/02/2021     nonobstructive    DDD (degenerative disc disease), cervical      DDD (degenerative disc disease), lumbar      Depression      Edema       9/2020 foot    GERD (gastroesophageal reflux disease)      H/O fall      Heart failure with mid-range ejection fraction 03/05/2022     EF 45% per 2D echo    Hypertension      Hypothyroidism      Insomnia      Low back pain      Moderate mitral regurgitation 01/05/2023    Neuropathy      Nonrheumatic tricuspid valve regurgitation      Pain in both feet      Polypharmacy      PONV (postoperative nausea and vomiting)      Pulmonary hypertension      Radiculopathy      Restless legs      Sacroiliitis      Sick sinus syndrome       Added automatically from request for surgery [id number]    Spondylolisthesis      Stage 3a chronic kidney disease      Urinary incontinence      Vitamin D deficiency              SURGICAL HISTORY    Surgical History         Past Surgical History:   Procedure Laterality Date    BACK SURGERY   07/19/2018     PDC &  PSF L3-L5 insitu    CARDIAC CATHETERIZATION N/A 4/2/2021     Procedure: Cardiac Catheterization/Vascular Study;  Surgeon: Barrett Evans MD;  Location: [facility] CATH INVASIVE LOCATION;  Service: Cardiovascular;  Laterality: N/A;    CARDIAC ELECTROPHYSIOLOGY PROCEDURE N/A 1/17/2023     Procedure: Pacemaker DC new;  Surgeon: Baljeet Valero MD;  Location: [facility] CATH INVASIVE LOCATION;  Service: Cardiovascular;  Laterality: N/A;     "CHOLECYSTECTOMY        COLONOSCOPY        COLONOSCOPY N/A 7/10/2024     Procedure: COLONOSCOPY with cold snare polypectomy x1 and endoscopic clipping x2;  Surgeon: MARLIN Vanegas MD;  Location: [facility] ENDOSCOPY;  Service: Gastroenterology;  Laterality: N/A;  Post- colon polyps, diverticulosis    ENDOSCOPY N/A 9/14/2023     Procedure: ESOPHAGOGASTRODUODENOSCOPY;  Surgeon: Ulysses Lamas MD;  Location: [facility] ENDOSCOPY;  Service: Gastroenterology;  Laterality: N/A;  gastritis, inflammatory gastric polyp    ENDOSCOPY N/A 7/10/2024     Procedure: ESOPHAGOGASTRODUODENOSCOPY;  Surgeon: MARLIN Vanegas MD;  Location: [facility] ENDOSCOPY;  Service: Gastroenterology;  Laterality: N/A;  Post- hiatal hernia, gastric polyps, gastritis    HYSTERECTOMY        ROTATOR CUFF REPAIR Left              FAMILY HISTORY          Family History   Problem Relation Age of Onset    Cancer Father      Diabetes Son      Cancer Paternal Aunt      Heart disease Paternal Aunt      Cancer Paternal Uncle           SOCIAL HISTORY    Social History            Tobacco Use    Smoking status: Never       Passive exposure: Never    Smokeless tobacco: Never   Substance Use Topics    Alcohol use: No         ALLERGIES    Allergies         Allergies   Allergen Reactions    Baclofen Rash    Codeine Nausea Only    Ibuprofen Unknown (See Comments)       Patient doesn't know----Motin    Methocarbamol Unknown (See Comments)       Patient doesn't know    Naproxen Unknown (See Comments)       Pt. Doesn't know     Tizanidine Hcl Other (See Comments)       Syncope     Tolmetin Dizziness       Pt. Doesn't know-- same as Tolectin                     /52   Pulse 84   Temp 97.7 °F (36.5 °C) (Oral)   Resp 14   Ht 162.6 cm (64\")   Wt 70.7 kg (155 lb 13.8 oz)   SpO2 96%   BMI 26.75 kg/m²   Intake/Output last 3 shifts:  I/O last 3 completed shifts:  In: 480 [P.O.:480]  Out: -   Intake/Output this shift:  I/O this shift:  In: 240 [P.O.:240]  Out: -    "   PHYSICAL EXAM:     General:          Alert, cooperative, no distress, appears stated age  Head:              Normocephalic, atraumatic, mucous membranes moist  Eyes:               Conjunctivae/corneas clear, EOM's intact     Neck:              Supple,  no adenopathy; no JVD or bruit  Lungs:            Clear to auscultation bilaterally, no wheezes, rhonchi or rales are noted  Chest wall:     No tenderness  Heart::             Irregularly irregular rate and rhythm, S1 and S2 normal, 2/6 murmur at the base  Abdomen:Soft, nontender, nondistended, bowel sounds active  Extremities:    No cyanosis, clubbing, or edema   Pulses:           2+ and symmetric all extremities  Skin:                No rashes or lesions  Neuro/psych:A&O x3. CN II through XII are grossly intact with appropriate affect     Scheduled Meds:         Scheduled Medication   apixaban, 5 mg, Oral, Q12H  [Held by provider] dilTIAZem, 60 mg, Oral, BID  ferrous sulfate, 324 mg, Oral, Daily With Breakfast  levothyroxine, 50 mcg, Oral, Q AM  metoprolol succinate XL, 50 mg, Oral, BID  spironolactone, 25 mg, Oral, Daily            Continuous Infusions:       Infusion Medications   dilTIAZem, 5-15 mg/hr, Last Rate: Stopped (09/30/24 2250)            PRN Meds:       PRN Medication     cyclobenzaprine    nitroglycerin    sodium chloride        Data Review:                Results Review:                I reviewed the patient's new clinical results.     CBC         Results from last 7 days   Lab Units 09/30/24  1453   WBC 10*3/mm3 6.27   HEMOGLOBIN g/dL 13.9   PLATELETS 10*3/mm3 267      Cr Clearance Estimated Creatinine Clearance: 44.2 mL/min (by C-G formula based on SCr of 0.93 mg/dL).  Coag        Results from last 7 days   Lab Units 09/30/24  1453   INR   1.05   APTT seconds 29.7*      HbA1C         Lab Results   Component Value Date     HGBA1C 6.27 (H) 07/08/2024     HGBA1C 5.80 (H) 09/18/2023     HGBA1C 5.9 (H) 01/05/2023      Blood Glucose No results found  "for: \"POCGLU\"  Infection     CMP        Results from last 7 days   Lab Units 09/30/24  1513   SODIUM mmol/L 141   POTASSIUM mmol/L 4.0   CHLORIDE mmol/L 106   CO2 mmol/L 24.5   BUN mg/dL 18   CREATININE mg/dL 0.93   GLUCOSE mg/dL 98   ALBUMIN g/dL 4.2   BILIRUBIN mg/dL 1.2   ALK PHOS U/L 63   AST (SGOT) U/L 15   ALT (SGPT) U/L <5      ABG      UA      CRISTY  No results found for: \"POCMETH\", \"POCAMPHET\", \"POCBARBITUR\", \"POCBENZO\", \"POCCOCAINE\", \"POCOPIATES\", \"POCOXYCODO\", \"POCPHENCYC\", \"POCPROPOXY\", \"POCTHC\", \"POCTRICYC\"  Lysis Labs         Results from last 7 days   Lab Units 09/30/24  1513 09/30/24  1453   INR    --  1.05   APTT seconds  --  29.7*   HEMOGLOBIN g/dL  --  13.9   PLATELETS 10*3/mm3  --  267   CREATININE mg/dL 0.93  --       Radiology(recent) XR Chest 1 View     Result Date: 9/30/2024  Impression: No acute process. Electronically Signed: Edil Joshua MD  9/30/2024 3:10 PM EDT  Workstation ID: [id number]              Results from last 7 days   Lab Units 10/01/24  0411   HSTROP T ng/L 12         X-rays, labs reviewed personally by physician.     ECG/EMG Results (most recent)         Procedure Component Value Units Date/Time     Telemetry Scan [[id number]] Resulted: 09/30/24       Updated: 10/01/24 0422     Telemetry Scan [[id number]] Resulted: 09/30/24       Updated: 10/01/24 0422     ECG 12 Lead ED Triage Standing Order; Chest Pain [[id number]] Collected: 10/01/24 0428       Updated: 10/01/24 0430       QT Interval 374 ms         QTC Interval 464 ms       Narrative:       HEART RATE=92  bpm  RR Jycgjohn=903  ms  MI Interval=  ms  P Horizontal Axis=  deg  P Front Axis=  deg  QRSD Interval=81  ms  QT Gprbfnlk=427  ms  TKvH=204  ms  QRS Axis=59  deg  T Wave Axis=224  deg  - ABNORMAL ECG -  Atrial fibrillation  Nonspecific repol abnormality, diffuse leads  Date and Time of Study:2024-10-01 04:28:57     ECG 12 Lead ED Triage Standing Order; Chest Pain [[id number]] Collected: 09/30/24 1436       Updated: 10/01/24 " 0607       QT Interval 449 ms         QTC Interval 535 ms       Narrative:       HEART RATE=85  bpm  RR Eliwgpqo=237  ms  NJ Interval=  ms  P Horizontal Axis=  deg  P Front Axis=  deg  QRSD Qcuqwqba=224  ms  QT Eletcyad=903  ms  RSxB=365  ms  QRS Axis=-45  deg  T Wave Axis=126  deg  - ABNORMAL ECG -  Atrial fibrillation  Left bundle branch block  When compared with ECG of 24-Jul-2024 14:22:18,  Significant change in rhythm  New conduction abnormality  Electronically Signed By: Keyur Hernandez ([facility]) 2024-10-01 06:07:04  Date and Time of Study:2024-09-30 14:36:29     Telemetry Scan [[id number]] Resulted: 09/30/24       Updated: 10/01/24 1017                   Medication Review:   I have reviewed the patient's current medication list  Scheduled Meds:  Scheduled Medication   apixaban, 5 mg, Oral, Q12H  [Held by provider] dilTIAZem, 60 mg, Oral, BID  ferrous sulfate, 324 mg, Oral, Daily With Breakfast  levothyroxine, 50 mcg, Oral, Q AM  metoprolol succinate XL, 50 mg, Oral, BID  spironolactone, 25 mg, Oral, Daily         Continuous Infusions:  Infusion Medications   dilTIAZem, 5-15 mg/hr, Last Rate: Stopped (09/30/24 2250)         PRN Meds:.  PRN Medication     cyclobenzaprine    nitroglycerin    sodium chloride        Imaging:  Imaging Results (Last 72 Hours)         Procedure Component Value Units Date/Time     XR Chest 1 View [[id number]] Collected: 09/30/24 1503       Updated: 09/30/24 1512     Narrative:       XR CHEST 1 VW     Date of Exam: 9/30/2024 2:59 PM EDT     Indication: Chest Pain Triage Protocol     Comparison: 7/24/2024     Findings:  Patient rotated to the right. Left-sided AICD noted. The lungs are without consolidation. Negative for pneumothorax. No pleural effusion. Osseous structures appear intact.        Impression:       Impression:  No acute process.           Electronically Signed: Edil Joshua MD    9/30/2024 3:10 PM EDT    Workstation ID: [id number]                   Lani Muhammad  "KALYANI  10/01/24  11:40 EDT        EMR Dragon/Transcription:   \"Dictated utilizing Dragon dictation\".                   Electronically signed by KALYANI Tay, 10/01/24, 11:40 AM EDT.  Copied text in this note has been reviewed by me and is accurate as of 10/01/24.        Patient seen and examined.  Patient was recently seen for atrial fibrillation and was started on amiodarone and cardioversion back to atrial fibrillation with symptoms.  Patient has a pacemaker in situ  Additionally  patient has moderate to severe MR and TR        Physical Exam     General:      well developed, well nourished, in no acute distress.    Head:      normocephalic and atraumatic.    Eyes:      PERRL/EOM intact, conjunctivae and sclerae clear without nystagmus.    Neck:      no  thyromegaly, trachea central with normal respiratory effort  Lungs:      clear bilaterally to auscultation.    Heart:       irregular rate and rhythm, S1, S2 without murmurs, rubs, or gallops  Skin:      intact without lesions or rashes.    Psych:      alert and cooperative; normal mood and affect; normal attention span and concentration.          Patient is off intravenous Cardizem for rate control.  Currently on beta-blockers and oral Cardizem.  Progressive worsening of TSH and therefore reducing amiodarone on this patient.  Add digoxin for better rate control  Patient will likely end up needing AF ablation which can be scheduled as an outpatient once AF rate is well-controlled.        Patient was seen in the hospital and above note reviewed  Amiodarone stopped because of increasing TSH and scheduled for AF ablation.  Patient is unsure if she has taken Eliquis today and patient's neighbor is going to check on it  In the interim CHEMO will be performed prior to AF ablation and if she has not taken Eliquis AF ablation will be performed today and if she did take her Eliquis today AF ablation has to be deferred  Discussed with the patient and her " family  Risks and benefits and outcomes  educated      Electronically signed by Baljeet Valero MD, 01/29/25, 2:25 PM EST.

## 2025-01-29 NOTE — DISCHARGE INSTRUCTIONS
CHEMO DC Instructions  The medication, which was used to put you to sleep, will be acting in your body for the next twenty-four (24) hours, so you might feel a little sleepy; this feeling will slowly wear off.  Because the medicine is still in your system for the next twenty-four (24) hours:  Do not drive a car, operate machinery, or power tools  Do not drink any alcoholic drinks (not even beer)  Make any important decisions such as to sign important papers    We strongly suggest that a responsible adult be with the patient the rest of the day.    What to do for pain: acetaminophen (Tylenol) and eating cool, soothing foods (e.g. ice cream, smoothies) can help with a sore throat. If your pain is unmanageable with these methods, call the Cardiologist's office.     It may be better to start with liquids such as water, then soups, and graduate up to solid foods  If medication was used to numb your throat, do not eat or drink anything for 2 hours.     Call the cardiologist with any problems of:  Excessive mucus  Spitting up blood  Sore throat more than 72 hours    Go to the nearest Emergency Department if you're vomiting blood or having difficulty breathing.

## 2025-01-29 NOTE — ANESTHESIA POSTPROCEDURE EVALUATION
Patient: Catalina Moreno    Procedure Summary       Date: 01/29/25 Room / Location: [facility]    Anesthesia Start: 1416 Anesthesia Stop: 1438    Procedure: ADULT TRANSESOPHAGEAL ECHO (CHEMO) W/ CONT IF NECESSARY PER PROTOCOL Diagnosis:       Paroxysmal atrial fibrillation      Atrial fibrillation with RVR      Chronic HFrEF (heart failure with reduced ejection fraction)      Presence of cardiac pacemaker      Essential (primary) hypertension      (Valvular Disease)      (Mitral Valve Assessment)    Scheduled Providers: Baljeet Valero MD Provider: Lazaro Vargas MD    Anesthesia Type: Not recorded ASA Status: Not recorded            Anesthesia Type: No value filed.    Vitals  Vitals Value Taken Time   /86 01/29/25 1505   Temp     Pulse 91 01/29/25 1514   Resp 18 01/29/25 1424   SpO2 95 % 01/29/25 1514   Vitals shown include unfiled device data.        Post Anesthesia Care and Evaluation    Patient location during evaluation: PACU  Patient participation: complete - patient participated  Level of consciousness: awake  Pain scale: See nurse's notes for pain score.  Pain management: adequate    Airway patency: patent  Anesthetic complications: No anesthetic complications  PONV Status: none  Cardiovascular status: acceptable  Respiratory status: acceptable and spontaneous ventilation  Hydration status: acceptable    Comments: Patient seen and examined postoperatively; vital signs stable; SpO2 greater than or equal to 90%; cardiopulmonary status stable; nausea/vomiting adequately controlled; pain adequately controlled; no apparent anesthesia complications; patient discharged from anesthesia care when discharge criteria were met

## 2025-02-14 RX ORDER — PANTOPRAZOLE SODIUM 40 MG/1
40 TABLET, DELAYED RELEASE ORAL DAILY
Qty: 90 TABLET | Refills: 1 | Status: SHIPPED | OUTPATIENT
Start: 2025-02-14

## 2025-02-18 DIAGNOSIS — I48.91 ATRIAL FIBRILLATION WITH RVR: ICD-10-CM

## 2025-02-18 RX ORDER — APIXABAN 5 MG/1
TABLET, FILM COATED ORAL
Qty: 60 TABLET | Refills: 1 | Status: SHIPPED | OUTPATIENT
Start: 2025-02-18

## 2025-02-21 ENCOUNTER — TELEPHONE (OUTPATIENT)
Dept: FAMILY MEDICINE CLINIC | Facility: CLINIC | Age: 85
End: 2025-02-21
Payer: MEDICARE

## 2025-02-21 NOTE — TELEPHONE ENCOUNTER
Spoke with pt to confirm 2/24 appt, pt asked if we can call back Monday am to see if she has transportation. Informed pt a call will be made Monday am

## 2025-02-24 NOTE — TELEPHONE ENCOUNTER
Attempted to call pt to see if she will have transportation for her appt today, phone rang with not voicemail. Will try call again later

## 2025-02-24 NOTE — TELEPHONE ENCOUNTER
Spoke with pt r/t 1:30 appt, pt states she does not have transportation to get to office. Pt states she will have someone call in to reschedule appt

## 2025-02-24 NOTE — TELEPHONE ENCOUNTER
Attempted again to call pt to see if she would have transportation for appt today, phone rang with no voicemail

## 2025-03-10 ENCOUNTER — APPOINTMENT (OUTPATIENT)
Dept: NUCLEAR MEDICINE | Facility: HOSPITAL | Age: 85
End: 2025-03-10
Payer: MEDICARE

## 2025-03-10 ENCOUNTER — READMISSION MANAGEMENT (OUTPATIENT)
Dept: CALL CENTER | Facility: HOSPITAL | Age: 85
End: 2025-03-10
Payer: MEDICARE

## 2025-03-10 ENCOUNTER — HOSPITAL ENCOUNTER (OUTPATIENT)
Facility: HOSPITAL | Age: 85
Discharge: HOME | End: 2025-03-10
Attending: EMERGENCY MEDICINE | Admitting: EMERGENCY MEDICINE
Payer: MEDICARE

## 2025-03-10 ENCOUNTER — APPOINTMENT (OUTPATIENT)
Dept: GENERAL RADIOLOGY | Facility: HOSPITAL | Age: 85
End: 2025-03-10
Payer: MEDICARE

## 2025-03-10 VITALS
BODY MASS INDEX: 31.41 KG/M2 | SYSTOLIC BLOOD PRESSURE: 125 MMHG | DIASTOLIC BLOOD PRESSURE: 64 MMHG | HEIGHT: 60 IN | OXYGEN SATURATION: 95 % | RESPIRATION RATE: 14 BRPM | HEART RATE: 92 BPM | WEIGHT: 160 LBS | TEMPERATURE: 97.9 F

## 2025-03-10 DIAGNOSIS — R07.9 CHEST PAIN, UNSPECIFIED TYPE: ICD-10-CM

## 2025-03-10 DIAGNOSIS — I48.91 ATRIAL FIBRILLATION WITH RVR: Primary | ICD-10-CM

## 2025-03-10 DIAGNOSIS — R53.1 WEAKNESS: ICD-10-CM

## 2025-03-10 LAB
ALBUMIN SERPL-MCNC: 4.2 G/DL (ref 3.5–5.2)
ALBUMIN/GLOB SERPL: 1.3 G/DL
ALP SERPL-CCNC: 84 U/L (ref 39–117)
ALT SERPL W P-5'-P-CCNC: 7 U/L (ref 1–33)
ANION GAP SERPL CALCULATED.3IONS-SCNC: 11.4 MMOL/L (ref 5–15)
AST SERPL-CCNC: 20 U/L (ref 1–32)
B PARAPERT DNA SPEC QL NAA+PROBE: NOT DETECTED
B PERT DNA SPEC QL NAA+PROBE: NOT DETECTED
BASOPHILS # BLD AUTO: 0.07 10*3/MM3 (ref 0–0.2)
BASOPHILS NFR BLD AUTO: 0.9 % (ref 0–1.5)
BH CV NUCLEAR PRIOR STUDY: 3
BH CV REST NUCLEAR ISOTOPE DOSE: 10.3 MCI
BH CV STRESS BP STAGE 1: NORMAL
BH CV STRESS BP STAGE 2: NORMAL
BH CV STRESS BP STAGE 3: NORMAL
BH CV STRESS COMMENTS STAGE 1: NORMAL
BH CV STRESS COMMENTS STAGE 2: NORMAL
BH CV STRESS DOSE REGADENOSON STAGE 1: 0.4
BH CV STRESS DURATION MIN STAGE 1: 0
BH CV STRESS DURATION MIN STAGE 2: 4
BH CV STRESS DURATION SEC STAGE 1: 10
BH CV STRESS DURATION SEC STAGE 2: 0
BH CV STRESS HR STAGE 1: 97
BH CV STRESS HR STAGE 2: 115
BH CV STRESS HR STAGE 3: 101
BH CV STRESS NUCLEAR ISOTOPE DOSE: 31.4 MCI
BH CV STRESS PROTOCOL 1: NORMAL
BH CV STRESS RECOVERY BP: NORMAL MMHG
BH CV STRESS RECOVERY HR: 101 BPM
BH CV STRESS STAGE 1: 1
BH CV STRESS STAGE 2: 2
BH CV STRESS STAGE 3: 3
BH CV STRESS STAGE 4: 4
BILIRUB SERPL-MCNC: 0.7 MG/DL (ref 0–1.2)
BUN SERPL-MCNC: 29 MG/DL (ref 8–23)
BUN/CREAT SERPL: 32.6 (ref 7–25)
C PNEUM DNA NPH QL NAA+NON-PROBE: NOT DETECTED
CALCIUM SPEC-SCNC: 9.6 MG/DL (ref 8.6–10.5)
CHLORIDE SERPL-SCNC: 98 MMOL/L (ref 98–107)
CO2 SERPL-SCNC: 25.6 MMOL/L (ref 22–29)
CREAT SERPL-MCNC: 0.89 MG/DL (ref 0.57–1)
DEPRECATED RDW RBC AUTO: 48.7 FL (ref 37–54)
EGFRCR SERPLBLD CKD-EPI 2021: 64.4 ML/MIN/1.73
EOSINOPHIL # BLD AUTO: 0.16 10*3/MM3 (ref 0–0.4)
EOSINOPHIL NFR BLD AUTO: 2 % (ref 0.3–6.2)
ERYTHROCYTE [DISTWIDTH] IN BLOOD BY AUTOMATED COUNT: 13.9 % (ref 12.3–15.4)
FLUAV SUBTYP SPEC NAA+PROBE: NOT DETECTED
FLUBV RNA ISLT QL NAA+PROBE: NOT DETECTED
GEN 5 1HR TROPONIN T REFLEX: 8 NG/L
GLOBULIN UR ELPH-MCNC: 3.2 GM/DL
GLUCOSE SERPL-MCNC: 106 MG/DL (ref 65–99)
HADV DNA SPEC NAA+PROBE: NOT DETECTED
HCOV 229E RNA SPEC QL NAA+PROBE: NOT DETECTED
HCOV HKU1 RNA SPEC QL NAA+PROBE: NOT DETECTED
HCOV NL63 RNA SPEC QL NAA+PROBE: NOT DETECTED
HCOV OC43 RNA SPEC QL NAA+PROBE: NOT DETECTED
HCT VFR BLD AUTO: 38.9 % (ref 34–46.6)
HGB BLD-MCNC: 12.6 G/DL (ref 12–15.9)
HMPV RNA NPH QL NAA+NON-PROBE: NOT DETECTED
HOLD SPECIMEN: NORMAL
HOLD SPECIMEN: NORMAL
HPIV1 RNA ISLT QL NAA+PROBE: NOT DETECTED
HPIV2 RNA SPEC QL NAA+PROBE: NOT DETECTED
HPIV3 RNA NPH QL NAA+PROBE: NOT DETECTED
HPIV4 P GENE NPH QL NAA+PROBE: NOT DETECTED
IMM GRANULOCYTES # BLD AUTO: 0.03 10*3/MM3 (ref 0–0.05)
IMM GRANULOCYTES NFR BLD AUTO: 0.4 % (ref 0–0.5)
LYMPHOCYTES # BLD AUTO: 1.76 10*3/MM3 (ref 0.7–3.1)
LYMPHOCYTES NFR BLD AUTO: 22.1 % (ref 19.6–45.3)
M PNEUMO IGG SER IA-ACNC: NOT DETECTED
MAXIMAL PREDICTED HEART RATE: 137 BPM
MCH RBC QN AUTO: 30.9 PG (ref 26.6–33)
MCHC RBC AUTO-ENTMCNC: 32.4 G/DL (ref 31.5–35.7)
MCV RBC AUTO: 95.3 FL (ref 79–97)
MONOCYTES # BLD AUTO: 0.6 10*3/MM3 (ref 0.1–0.9)
MONOCYTES NFR BLD AUTO: 7.5 % (ref 5–12)
NEUTROPHILS NFR BLD AUTO: 5.36 10*3/MM3 (ref 1.7–7)
NEUTROPHILS NFR BLD AUTO: 67.1 % (ref 42.7–76)
NRBC BLD AUTO-RTO: 0 /100 WBC (ref 0–0.2)
NT-PROBNP SERPL-MCNC: 1815 PG/ML (ref 0–1800)
PERCENT MAX PREDICTED HR: 83.94 %
PLATELET # BLD AUTO: 220 10*3/MM3 (ref 140–450)
PMV BLD AUTO: 9.6 FL (ref 6–12)
POTASSIUM SERPL-SCNC: 4.4 MMOL/L (ref 3.5–5.2)
PROT SERPL-MCNC: 7.4 G/DL (ref 6–8.5)
RBC # BLD AUTO: 4.08 10*6/MM3 (ref 3.77–5.28)
RHINOVIRUS RNA SPEC NAA+PROBE: NOT DETECTED
RSV RNA NPH QL NAA+NON-PROBE: NOT DETECTED
SARS-COV-2 RNA RESP QL NAA+PROBE: NOT DETECTED
SODIUM SERPL-SCNC: 135 MMOL/L (ref 136–145)
SPECT HRT GATED+EF W RNC IV: 74 %
STRESS BASELINE BP: NORMAL MMHG
STRESS BASELINE HR: 96 BPM
STRESS PERCENT HR: 99 %
STRESS POST PEAK BP: NORMAL MMHG
STRESS POST PEAK HR: 115 BPM
STRESS TARGET HR: 116 BPM
T4 FREE SERPL-MCNC: 0.93 NG/DL (ref 0.93–1.7)
TROPONIN T NUMERIC DELTA: -1 NG/L
TROPONIN T SERPL HS-MCNC: 9 NG/L
TSH SERPL DL<=0.05 MIU/L-ACNC: 6.4 UIU/ML (ref 0.27–4.2)
WBC NRBC COR # BLD AUTO: 7.98 10*3/MM3 (ref 3.4–10.8)
WHOLE BLOOD HOLD COAG: NORMAL
WHOLE BLOOD HOLD SPECIMEN: NORMAL

## 2025-03-10 PROCEDURE — A9270 NON-COVERED ITEM OR SERVICE: HCPCS | Performed by: NURSE PRACTITIONER

## 2025-03-10 PROCEDURE — 84443 ASSAY THYROID STIM HORMONE: CPT | Performed by: NURSE PRACTITIONER

## 2025-03-10 PROCEDURE — 63710000001 PANTOPRAZOLE 40 MG TABLET DELAYED-RELEASE: Performed by: NURSE PRACTITIONER

## 2025-03-10 PROCEDURE — 80053 COMPREHEN METABOLIC PANEL: CPT | Performed by: EMERGENCY MEDICINE

## 2025-03-10 PROCEDURE — G0378 HOSPITAL OBSERVATION PER HR: HCPCS

## 2025-03-10 PROCEDURE — 63710000001 HYDROCODONE-ACETAMINOPHEN 5-325 MG TABLET: Performed by: NURSE PRACTITIONER

## 2025-03-10 PROCEDURE — 97166 OT EVAL MOD COMPLEX 45 MIN: CPT

## 2025-03-10 PROCEDURE — 93017 CV STRESS TEST TRACING ONLY: CPT

## 2025-03-10 PROCEDURE — 71045 X-RAY EXAM CHEST 1 VIEW: CPT

## 2025-03-10 PROCEDURE — 83880 ASSAY OF NATRIURETIC PEPTIDE: CPT | Performed by: EMERGENCY MEDICINE

## 2025-03-10 PROCEDURE — 63710000001 LEVOTHYROXINE 50 MCG TABLET: Performed by: NURSE PRACTITIONER

## 2025-03-10 PROCEDURE — 0202U NFCT DS 22 TRGT SARS-COV-2: CPT

## 2025-03-10 PROCEDURE — 93018 CV STRESS TEST I&R ONLY: CPT | Performed by: INTERNAL MEDICINE

## 2025-03-10 PROCEDURE — 25010000002 REGADENOSON 0.4 MG/5ML SOLUTION: Performed by: EMERGENCY MEDICINE

## 2025-03-10 PROCEDURE — 93005 ELECTROCARDIOGRAM TRACING: CPT | Performed by: EMERGENCY MEDICINE

## 2025-03-10 PROCEDURE — 93005 ELECTROCARDIOGRAM TRACING: CPT

## 2025-03-10 PROCEDURE — 34310000005 TECHNETIUM TETROFOSMIN KIT: Performed by: EMERGENCY MEDICINE

## 2025-03-10 PROCEDURE — 63710000001 SPIRONOLACTONE 25 MG TABLET: Performed by: NURSE PRACTITIONER

## 2025-03-10 PROCEDURE — 84439 ASSAY OF FREE THYROXINE: CPT | Performed by: NURSE PRACTITIONER

## 2025-03-10 PROCEDURE — 96374 THER/PROPH/DIAG INJ IV PUSH: CPT

## 2025-03-10 PROCEDURE — 36415 COLL VENOUS BLD VENIPUNCTURE: CPT

## 2025-03-10 PROCEDURE — 63710000001 HYDROCODONE-ACETAMINOPHEN 5-325 MG TABLET: Performed by: EMERGENCY MEDICINE

## 2025-03-10 PROCEDURE — 84484 ASSAY OF TROPONIN QUANT: CPT | Performed by: EMERGENCY MEDICINE

## 2025-03-10 PROCEDURE — A9502 TC99M TETROFOSMIN: HCPCS | Performed by: EMERGENCY MEDICINE

## 2025-03-10 PROCEDURE — 63710000001 DILTIAZEM 30 MG TABLET: Performed by: NURSE PRACTITIONER

## 2025-03-10 PROCEDURE — 97162 PT EVAL MOD COMPLEX 30 MIN: CPT

## 2025-03-10 PROCEDURE — 63710000001 METOPROLOL SUCCINATE XL 50 MG TABLET SUSTAINED-RELEASE 24 HOUR: Performed by: NURSE PRACTITIONER

## 2025-03-10 PROCEDURE — 78452 HT MUSCLE IMAGE SPECT MULT: CPT | Performed by: INTERNAL MEDICINE

## 2025-03-10 PROCEDURE — 99285 EMERGENCY DEPT VISIT HI MDM: CPT

## 2025-03-10 PROCEDURE — 78452 HT MUSCLE IMAGE SPECT MULT: CPT

## 2025-03-10 PROCEDURE — A9270 NON-COVERED ITEM OR SERVICE: HCPCS | Performed by: EMERGENCY MEDICINE

## 2025-03-10 PROCEDURE — 85025 COMPLETE CBC W/AUTO DIFF WBC: CPT | Performed by: EMERGENCY MEDICINE

## 2025-03-10 PROCEDURE — 99214 OFFICE O/P EST MOD 30 MIN: CPT | Performed by: INTERNAL MEDICINE

## 2025-03-10 RX ORDER — SODIUM CHLORIDE 0.9 % (FLUSH) 0.9 %
10 SYRINGE (ML) INJECTION EVERY 12 HOURS SCHEDULED
Status: DISCONTINUED | OUTPATIENT
Start: 2025-03-10 | End: 2025-03-10 | Stop reason: HOSPADM

## 2025-03-10 RX ORDER — POLYETHYLENE GLYCOL 3350 17 G/17G
17 POWDER, FOR SOLUTION ORAL DAILY PRN
Status: DISCONTINUED | OUTPATIENT
Start: 2025-03-10 | End: 2025-03-10 | Stop reason: HOSPADM

## 2025-03-10 RX ORDER — DILTIAZEM HYDROCHLORIDE 5 MG/ML
10 INJECTION INTRAVENOUS ONCE
Status: COMPLETED | OUTPATIENT
Start: 2025-03-10 | End: 2025-03-10

## 2025-03-10 RX ORDER — LEVOTHYROXINE SODIUM 50 UG/1
50 TABLET ORAL DAILY
Status: DISCONTINUED | OUTPATIENT
Start: 2025-03-10 | End: 2025-03-10 | Stop reason: HOSPADM

## 2025-03-10 RX ORDER — SODIUM CHLORIDE 0.9 % (FLUSH) 0.9 %
10 SYRINGE (ML) INJECTION AS NEEDED
Status: DISCONTINUED | OUTPATIENT
Start: 2025-03-10 | End: 2025-03-10 | Stop reason: HOSPADM

## 2025-03-10 RX ORDER — SPIRONOLACTONE 25 MG/1
25 TABLET ORAL DAILY
Status: DISCONTINUED | OUTPATIENT
Start: 2025-03-10 | End: 2025-03-10 | Stop reason: HOSPADM

## 2025-03-10 RX ORDER — REGADENOSON 0.08 MG/ML
0.4 INJECTION, SOLUTION INTRAVENOUS
Status: COMPLETED | OUTPATIENT
Start: 2025-03-10 | End: 2025-03-10

## 2025-03-10 RX ORDER — ONDANSETRON 2 MG/ML
4 INJECTION INTRAMUSCULAR; INTRAVENOUS EVERY 6 HOURS PRN
Status: DISCONTINUED | OUTPATIENT
Start: 2025-03-10 | End: 2025-03-10 | Stop reason: HOSPADM

## 2025-03-10 RX ORDER — HYDROCODONE BITARTRATE AND ACETAMINOPHEN 5; 325 MG/1; MG/1
1 TABLET ORAL EVERY 6 HOURS PRN
Refills: 0 | Status: DISCONTINUED | OUTPATIENT
Start: 2025-03-10 | End: 2025-03-10 | Stop reason: HOSPADM

## 2025-03-10 RX ORDER — ATORVASTATIN CALCIUM 20 MG/1
20 TABLET, FILM COATED ORAL DAILY
Qty: 90 TABLET | Refills: 0 | Status: SHIPPED | OUTPATIENT
Start: 2025-03-10 | End: 2025-06-08

## 2025-03-10 RX ORDER — ACETAMINOPHEN 325 MG/1
650 TABLET ORAL EVERY 6 HOURS PRN
Status: DISCONTINUED | OUTPATIENT
Start: 2025-03-10 | End: 2025-03-10 | Stop reason: HOSPADM

## 2025-03-10 RX ORDER — BISACODYL 5 MG/1
5 TABLET, DELAYED RELEASE ORAL DAILY PRN
Status: DISCONTINUED | OUTPATIENT
Start: 2025-03-10 | End: 2025-03-10 | Stop reason: HOSPADM

## 2025-03-10 RX ORDER — SODIUM CHLORIDE 9 MG/ML
40 INJECTION, SOLUTION INTRAVENOUS AS NEEDED
Status: DISCONTINUED | OUTPATIENT
Start: 2025-03-10 | End: 2025-03-10 | Stop reason: HOSPADM

## 2025-03-10 RX ORDER — METOPROLOL SUCCINATE 50 MG/1
100 TABLET, EXTENDED RELEASE ORAL DAILY
Status: DISCONTINUED | OUTPATIENT
Start: 2025-03-10 | End: 2025-03-10 | Stop reason: HOSPADM

## 2025-03-10 RX ORDER — HYDROCODONE BITARTRATE AND ACETAMINOPHEN 5; 325 MG/1; MG/1
1 TABLET ORAL ONCE AS NEEDED
Refills: 0 | Status: COMPLETED | OUTPATIENT
Start: 2025-03-10 | End: 2025-03-10

## 2025-03-10 RX ORDER — BISACODYL 10 MG
10 SUPPOSITORY, RECTAL RECTAL DAILY PRN
Status: DISCONTINUED | OUTPATIENT
Start: 2025-03-10 | End: 2025-03-10 | Stop reason: HOSPADM

## 2025-03-10 RX ORDER — AMOXICILLIN 250 MG
2 CAPSULE ORAL 2 TIMES DAILY PRN
Status: DISCONTINUED | OUTPATIENT
Start: 2025-03-10 | End: 2025-03-10 | Stop reason: HOSPADM

## 2025-03-10 RX ORDER — DILTIAZEM HYDROCHLORIDE 30 MG/1
30 TABLET, FILM COATED ORAL 2 TIMES DAILY
Status: DISCONTINUED | OUTPATIENT
Start: 2025-03-10 | End: 2025-03-10 | Stop reason: HOSPADM

## 2025-03-10 RX ORDER — PANTOPRAZOLE SODIUM 40 MG/1
40 TABLET, DELAYED RELEASE ORAL DAILY
Status: DISCONTINUED | OUTPATIENT
Start: 2025-03-10 | End: 2025-03-10 | Stop reason: HOSPADM

## 2025-03-10 RX ADMIN — DILTIAZEM HYDROCHLORIDE 30 MG: 30 TABLET, FILM COATED ORAL at 09:50

## 2025-03-10 RX ADMIN — REGADENOSON 0.4 MG: 0.08 INJECTION, SOLUTION INTRAVENOUS at 12:53

## 2025-03-10 RX ADMIN — DILTIAZEM HYDROCHLORIDE 10 MG: 5 INJECTION, SOLUTION INTRAVENOUS at 01:50

## 2025-03-10 RX ADMIN — HYDROCODONE BITARTRATE AND ACETAMINOPHEN 1 TABLET: 5; 325 TABLET ORAL at 14:51

## 2025-03-10 RX ADMIN — SPIRONOLACTONE 25 MG: 25 TABLET ORAL at 09:50

## 2025-03-10 RX ADMIN — METOPROLOL SUCCINATE 100 MG: 50 TABLET, EXTENDED RELEASE ORAL at 14:51

## 2025-03-10 RX ADMIN — TETROFOSMIN 1 DOSE: 1.38 INJECTION, POWDER, LYOPHILIZED, FOR SOLUTION INTRAVENOUS at 11:39

## 2025-03-10 RX ADMIN — TETROFOSMIN 1 DOSE: 1.38 INJECTION, POWDER, LYOPHILIZED, FOR SOLUTION INTRAVENOUS at 12:53

## 2025-03-10 RX ADMIN — LEVOTHYROXINE SODIUM 50 MCG: 50 TABLET ORAL at 09:50

## 2025-03-10 RX ADMIN — HYDROCODONE BITARTRATE AND ACETAMINOPHEN 1 TABLET: 5; 325 TABLET ORAL at 07:15

## 2025-03-10 RX ADMIN — Medication 10 ML: at 09:50

## 2025-03-10 RX ADMIN — PANTOPRAZOLE SODIUM 40 MG: 40 TABLET, DELAYED RELEASE ORAL at 14:51

## 2025-03-10 NOTE — DISCHARGE PLACEMENT REQUEST
"Catalina Carrera (84 y.o. Female)       Date of Birth   1940    Social Security Number       Address   [address] [place] [address] [place] [facility]    Home Phone   [phone]    MRN   [id number]       Taoism   None    Marital Status                               Admission Date   3/10/2025    Admission Type   Emergency    Admitting Provider   Tommy Greco MD    Attending Provider   Tommy Greco MD    Department, Room/Bed   [facility] OBSERVATION, 228/1       Discharge Date       Discharge Disposition       Discharge Destination                                 Attending Provider: Tommy Greco MD    Allergies: Baclofen, Codeine, Ibuprofen, Methocarbamol, Naproxen, Tizanidine Hcl, Tolmetin    Isolation: None   Infection: None   Code Status: CPR    Ht: 152.4 cm (60\")   Wt: 72.6 kg (160 lb)    Admission Cmt: None   Principal Problem: None                  Active Insurance as of 3/10/2025       Primary Coverage       Payor Plan Insurance Group Employer/Plan Group    MEDICARE MEDICARE A & B        Payor Plan Address Payor Plan Phone Number Payor Plan Fax Number Effective Dates    [address] [phone]  4/1/2006 - None Entered    [facility] [id number]         Subscriber Name Subscriber Birth Date Member ID       CATALINA CARRERA 1940 [id number]               Secondary Coverage       Payor Plan Insurance Group Employer/Plan Group    ANTH BLUE CROSS ANTHEM BLUE CROSS BLUE SHIELD PPO [id number]       Payor Plan Address Payor Plan Phone Number Payor Plan Fax Number Effective Dates    [address] [phone]  2/2/2022 - None Entered    [facility] [id number]         Subscriber Name Subscriber Birth Date Member ID       CATALINA CARRERA 1940 [id number]                     Emergency Contacts        (Rel.) Home Phone Work Phone Mobile Phone    Shwetha Eldridge (Neighbor) [phone] -- --    MADHAV DEAN (Son) [phone] -- [phone]    aramis dean (Son) " [phone] -- [phone]              Insurance Information                  MEDICARE/MEDICARE A & B Phone: [phone]    Subscriber: Catalina Moreno Subscriber#: [id number]    Group#: -- Precert#: --    Authorization#: NPN Obs Effective Date: --        ANTHEM BLUE CROSS/ANTHEM BLUE CROSS BLUE SHIELD PPO Phone: [phone]    Subscriber: Catalina Moreno Subscriber#: [id number]    Group#: [id number] Precert#: --    Authorization#: 2nd to Medicare NPN Obs Effective Date: --

## 2025-03-10 NOTE — CASE MANAGEMENT/SOCIAL WORK
Discharge Planning Assessment  [facility]     Patient Name: Catalina Moreno  MRN: [id number]  Today's Date: 3/10/2025    Admit Date: 3/10/2025    Plan: Return home alone. Neighbor or sister to transport. Referred to [facility]; acceptance pending   Discharge Needs Assessment       Row Name 03/10/25 1545       Living Environment    People in Home alone    Current Living Arrangements home    Potentially Unsafe Housing Conditions none    In the past 12 months has the electric, gas, oil, or water company threatened to shut off services in your home? No    Primary Care Provided by self    Provides Primary Care For no one    Family Caregiver if Needed sibling(s)    Quality of Family Relationships helpful;involved;supportive    Able to Return to Prior Arrangements yes    Living Arrangement Comments neighbor or sister help       Resource/Environmental Concerns    Resource/Environmental Concerns none    Transportation Concerns none       Transportation Needs    In the past 12 months, has lack of transportation kept you from medical appointments or from getting medications? no    In the past 12 months, has lack of transportation kept you from meetings, work, or from getting things needed for daily living? No       Food Insecurity    Within the past 12 months, you worried that your food would run out before you got the money to buy more. Never true    Within the past 12 months, the food you bought just didn't last and you didn't have money to get more. Never true       Transition Planning    Patient/Family Anticipates Transition to home    Patient/Family Anticipated Services at Transition none    Transportation Anticipated car, drives self;family or friend will provide       Discharge Needs Assessment    Readmission Within the Last 30 Days no previous admission in last 30 days    Equipment Currently Used at Home rollator;cane, straight    Concerns to be Addressed discharge planning    Do you want help finding or keeping work or a  job? I do not need or want help    Anticipated Changes Related to Illness none    Equipment Needed After Discharge none    Outpatient/Agency/Support Group Needs homecare agency    Discharge Facility/Level of Care Needs home with home health    Patient's Choice of Community Agency(s) [facility]                   Discharge Plan       Row Name 03/10/25 1546       Plan    Plan Return home alone. Neighbor or sister to transport. Referred to [facility]; acceptance pending    Patient/Family in Agreement with Plan yes    Plan Comments Barriers: Cardiology clearance. Stress Test. CM met with Ms. Moreno who is a/o and said she lives alone, drives and is independent. Her sister and neighbor help if needed and will  at discharge. She has a rolling wlaker to use at home. PCP and Pharmacy confirmed. She denied any financial concerns. CM discussed recommendation for home health and she chose [facility] who she has used in the past. CM made referral to [facility] and added to Epic                  Continued Care and Services - Admitted Since 3/10/2025       Home Medical Care       Service Provider Request Status Services Address Phone Fax Patient Preferred    [facility] Pending - Request Sent [facility] [address] [facility] [place] [address] [phone] --                       Demographic Summary       Row Name 03/10/25 1549       General Information    Admission Type observation    Arrived From emergency department    Required Notices Provided Observation Status Notice    Referral Source admission list    Reason for Consult discharge planning    Preferred Language English                   Functional Status       Row Name 03/10/25 1544       Functional Status    Usual Activity Tolerance moderate    Current Activity Tolerance moderate       Functional Status, IADL    Medications independent    Meal Preparation independent    Housekeeping independent    Laundry independent    Shopping independent     If for any reason you need help with day-to-day activities such as bathing, preparing meals, shopping, managing finances, etc., do you get the help you need? I don't need any help    IADL Comments reported independent       Mental Status    General Appearance WDL WDL       Mental Status Summary    Recent Changes in Mental Status/Cognitive Functioning no changes       Employment/    Employment Status retired                          Earline ERAZO,RN Case Manager  [facility]  Phone: desk- [phone] cell- [phone]

## 2025-03-10 NOTE — PLAN OF CARE
Goal Outcome Evaluation:  Plan of Care Reviewed With: patient           Outcome Evaluation: Pt is a 85 y/o female admitted to [facility] on 3/10/25 with c/o chest pain with associated nausea and SOA. XR chest: Cardiomegaly. No active disease. EKG: Atrial fibrillation flutter with ventricular paced complexes, pt was given dose of Cardizem. PMHx significant for CKDIII, HTN, pacemaker in place, depressive disorder, fibromyalgia, CHF, hx falls, DDD, and polyneuropathy. At baseline, pt resides alone in a [facility] with x0 VLADIMIR. She is actively driving and IND with ADLs, however reports decreased endurance and has now been sponge bathing due to difficulty transferring from tub-shower. Pt with limited family support, states her neighbor comes over to assist with medication management. Upon assessment, pt A&O to name/, place, year, and situation. Disoriented to current month. SBT administered, pt scored 14/28 indicating impaired cognition. Pt does state she has been having short-term memory deficits within the past few months and this has been frustrating for her, however aware. Pt completes bed mobility IND and sat EOB with SBA. Pt exhibits global weakness, BUE grossly 4-/5 and noted with decreased endurance. BP hypertensive, 164/99 and A-fib; ranging from . Pt comes to standing with CGA/HHA and able to take x4 side steps towards HOB. Pt appears slightly off balance, requiring increased time to get bearings. Based on assessment, pt would benefit from continued skilled OT services while admitted and OT recommending home health services when medically appropriate for d/c.    Anticipated Discharge Disposition (OT): home with home health

## 2025-03-10 NOTE — CONSULTS
Cardiology Consult Note    Patient Identification:  Name: Catalina Moreno  Age: 84 y.o.  Sex: female  :  1940  MRN: [id number]             Requesting Physician :  Tommy Greco MD     Reason for Consultation / Chief Complaint :   Chest pain    History of Present Illness:        Catalina Moreno is a 83 y.o. female with PMH of:    Paroxysmal atrial fibrillation on Eliquis  HFpEF  Diastolic heart failure  Moderate to severe mitral valve regurgitation  Pulmonary hypertension  Pulmonary hypertension  CAD  Dyslipidemia  Sick sinus syndrome status post pacemaker  Renal insufficiency    who presented to [facility] on 3/10/2025 with weakness and chest discomfort.  Patient reports symptoms started yesterday.  She states she began to have midsternal chest discomfort radiating across chest.  Associated symptoms of nausea, shortness of breath, palpitations.  She denies vomiting, edema, cough, fever, recent illness.  She recently underwent CHEMO at the end of January for evaluation of atrial fib ablation and was deemed not a candidate per Dr. Valero.    CHEMO 2025 revealed LVEF of 51 to 55%, grade 3 diastolic dysfunction, moderate tricuspid valve regurgitation and moderate to severe mitral valve regurgitation with severely dilated LA    CXR with no acute cardiopulmonary disease    Labs:  3/10/2025: Troponin 9, proBNP 1815, sodium 135, BUN/creatinine 29/0.89, CBC unremarkable    Obtain RVP  Obtain Myoview study     Further recommendations and assessment per Dr. Celis     Electronically signed by KALYANI Greene, 03/10/25, 9:51 AM EDT.  Cardiology attending addendum :    I have personally performed a face-to-face diagnostic evaluation, physical exam and reviewed data on this patient.  I have reviewed documentation done by me and nurse practitioner  and corrected as needed.  And agree with the different components of documentation.Greater than 50% of the time spent in the care of this patient was provided by  attending consultant/me.        Assessment:    Chest discomfort  CAD  A-fib  Pacemaker  Hypertension  Hyperlipidemia  Chronic HFpEF          Plan:    Patient presented through emergency room 3/10/2025 with complaint of chest pain which is like a pressure on and off with some nausea and shortness of breath.  Serial troponins were normal at 8, 9, 8.  EKG done 3/10/2025 reviewed/interpreted by me reveals A-fib with a rate of 89 bpm with PVCs.  Telemetry is revealing sinus rhythm.  Patient underwent Lexiscan Cardiolite 3/10/2025 which was negative for ischemia EF was 74%  Would recommend evaluating other etiologies  Patient has elevated [id number]-CEHd score due to age over 75, female gender, CHF, hypertension, vascular disease making it 6, will benefit from long-term anticoagulation to prevent thromboembolic events from A-fib.  Continue Eliquis as tolerated    [id number] SCORE   OOP5FW3-YRIp Score: 6 (3/23/2024 10:27 AM)    Reviewed stress test results with patient  Will follow-up as outpatient      Cardiographics  ECG: EKG tracing was  personally reviewed/interpreted by me  ECG 12 Lead Chest Pain   Preliminary Result   HEART RATE=89  bpm   RR Btegilym=039  ms   IN Interval=  ms   P Horizontal Axis=  deg   P Front Axis=  deg   QRSD Interval=68  ms   QT Hllkqvok=911  ms   EPjT=591  ms   QRS Axis=63  deg   T Wave Axis=69  deg   - ABNORMAL ECG -   Afib/flut and V-paced complexes   Date and Time of Study:2025-03-10 00:13:26      Telemetry Scan   Final Result      Telemetry Scan   Final Result      Telemetry Scan   Final Result      Telemetry Scan   Final Result          Telemetry: A-fib with controlled ventricular response             Diagnosis Plan   1. Atrial fibrillation with RVR        2. Chest pain, unspecified type        3. Weakness  Ambulatory Referral to Home Health                     Past Medical History:  Past Medical History:   Diagnosis Date    Acquired spondylolisthesis of lumbosacral region     Acute kidney  injury 07/22/2022    Anxiety     Arthritis     Atrial fibrillation     paroxysmal    Broken shoulder     left-- due to fall 11-7-19 was at [facility]    Chronic pain disorder     Chronic systolic CHF (congestive heart failure) 01/05/2023    EF 36-40%    Coronary artery disease involving native coronary artery of native heart without angina pectoris 04/02/2021    nonobstructive    DDD (degenerative disc disease), cervical     DDD (degenerative disc disease), lumbar     Depression     Edema     9/2020 foot    GERD (gastroesophageal reflux disease)     H/O fall     Heart failure with mid-range ejection fraction 03/05/2022    EF 45% per 2D echo    Hypertension     Hypothyroidism     Insomnia     Low back pain     Moderate mitral regurgitation 01/05/2023    Neuropathy     Nonrheumatic tricuspid valve regurgitation     Pain in both feet     Polypharmacy     PONV (postoperative nausea and vomiting)     Pulmonary hypertension     Radiculopathy     Restless legs     Sacroiliitis     Sick sinus syndrome     Added automatically from request for surgery [id number]    Spondylolisthesis     Stage 3a chronic kidney disease     Urinary incontinence     Vitamin D deficiency      Past Surgical History:  Past Surgical History:   Procedure Laterality Date    BACK SURGERY  07/19/2018    PDC &  PSF L3-L5 insitu    CARDIAC CATHETERIZATION N/A 4/2/2021    Procedure: Cardiac Catheterization/Vascular Study;  Surgeon: Barrett Evans MD;  Location: [facility] CATH INVASIVE LOCATION;  Service: Cardiovascular;  Laterality: N/A;    CARDIAC ELECTROPHYSIOLOGY PROCEDURE N/A 1/17/2023    Procedure: Pacemaker DC new;  Surgeon: Baljeet Valero MD;  Location: [facility] CATH INVASIVE LOCATION;  Service: Cardiovascular;  Laterality: N/A;    CHOLECYSTECTOMY      COLONOSCOPY      COLONOSCOPY N/A 7/10/2024    Procedure: COLONOSCOPY with cold snare polypectomy x1 and endoscopic clipping x2;  Surgeon: MARLIN Vanegas MD;  Location: [facility] ENDOSCOPY;   Service: Gastroenterology;  Laterality: N/A;  Post- colon polyps, diverticulosis    ENDOSCOPY N/A 9/14/2023    Procedure: ESOPHAGOGASTRODUODENOSCOPY;  Surgeon: Ulysses Lamas MD;  Location: [facility] ENDOSCOPY;  Service: Gastroenterology;  Laterality: N/A;  gastritis, inflammatory gastric polyp    ENDOSCOPY N/A 7/10/2024    Procedure: ESOPHAGOGASTRODUODENOSCOPY;  Surgeon: MARLIN Vanegas MD;  Location: [facility] ENDOSCOPY;  Service: Gastroenterology;  Laterality: N/A;  Post- hiatal hernia, gastric polyps, gastritis    HYSTERECTOMY      ROTATOR CUFF REPAIR Left       Allergies:  Allergies   Allergen Reactions    Baclofen Rash    Codeine Nausea Only    Ibuprofen Unknown (See Comments)     Patient doesn't know----Motin    Methocarbamol Unknown (See Comments)     Patient doesn't know    Naproxen Unknown (See Comments)     Pt. Doesn't know     Tizanidine Hcl Other (See Comments)     Syncope     Tolmetin Dizziness     Pt. Doesn't know-- same as Tolectin     Home Meds:  No medications prior to admission.     Current Meds:   No current facility-administered medications for this encounter.    Current Outpatient Medications:     apixaban (Eliquis) 5 MG tablet tablet, TAKE 1 TABLET BY MOUTH EVERY 12 HOURS FOR ATRIAL FIBRILLATION, Disp: 60 tablet, Rfl: 1    dilTIAZem (CARDIZEM) 30 MG tablet, Take 1 tablet by mouth 2 (Two) Times a Day., Disp: , Rfl:     ferrous gluconate (FERGON) 324 MG tablet, Take 1 tablet by mouth Every Other Day., Disp: 45 tablet, Rfl: 1    levothyroxine (SYNTHROID, LEVOTHROID) 50 MCG tablet, Take 1 tablet by mouth Daily., Disp: 90 tablet, Rfl: 1    Magnesium 250 MG capsule, Take 1 capsule by mouth Daily., Disp: , Rfl:     metoprolol succinate XL (TOPROL-XL) 100 MG 24 hr tablet, Take 1 tablet by mouth Daily., Disp: 30 tablet, Rfl: 2    pantoprazole (PROTONIX) 40 MG EC tablet, TAKE 1 TABLET BY MOUTH EVERY DAY, Disp: 90 tablet, Rfl: 1    spironolactone (ALDACTONE) 25 MG tablet, Take 1 tablet by mouth  "Daily., Disp: , Rfl:     Vitamin D, Cholecalciferol, (CHOLECALCIFEROL) 10 MCG (400 UNIT) tablet, Take 1 tablet by mouth Daily., Disp: , Rfl:     atorvastatin (Lipitor) 20 MG tablet, Take 1 tablet by mouth Daily for 90 days., Disp: 90 tablet, Rfl: 0  Social History:   Social History     Tobacco Use    Smoking status: Never     Passive exposure: Never    Smokeless tobacco: Never   Substance Use Topics    Alcohol use: No      Family History:  Family History   Problem Relation Age of Onset    Cancer Father     Diabetes Son     Cancer Paternal Aunt     Heart disease Paternal Aunt     Cancer Paternal Uncle         Review of Systems : Review of Systems   Constitutional: Positive for malaise/fatigue. Negative for chills and fever.   Cardiovascular:  Positive for chest pain and palpitations. Negative for leg swelling and syncope.   Respiratory:  Positive for shortness of breath. Negative for cough.    Gastrointestinal:  Positive for nausea. Negative for abdominal pain and vomiting.   Neurological:  Positive for weakness. Negative for dizziness, headaches and light-headedness.        Constitutional:  Temp:  [97.6 °F (36.4 °C)-99.1 °F (37.3 °C)] 97.9 °F (36.6 °C)  Heart Rate:  [] 92  Resp:  [14-17] 14  BP: (109-169)/() 125/64    Physical Exam   /64 (BP Location: Left arm, Patient Position: Sitting)   Pulse 92   Temp 97.9 °F (36.6 °C) (Oral)   Resp 14   Ht 152.4 cm (60\")   Wt 72.6 kg (160 lb)   SpO2 95%   BMI 31.25 kg/m²   Physical Exam  General:  Appears in no acute distress  Eyes: Sclerae are anicteric,  conjunctivae are clear   HEENT:  No JVD. Thyroid not visibly enlarged. No mucosal pallor or cyanosis  Respiratory: Respirations regular and unlabored at rest.  Bilaterally good breath sounds with good air entry in all fields. No crackles, rubs or wheezes auscultated  Cardiovascular: S1,S2 regular rate.  Irregularly irregular rhythm  Lungs: auscultated. No pretibial pitting edema  Gastrointestinal: " Abdomen nondistended.  Musculoskeletal:  No abnormal movements  Extremities: No digital clubbing or cyanosis  Skin: Color pink. Skin warm and dry to touch.   Neuro: Alert and awake, no lateralizing deficits appreciated        Echocardiogram:   Results for orders placed during the hospital encounter of 01/29/25    Adult Transesophageal Echo (CHEMO) W/ Cont if Necessary Per Protocol    Interpretation Summary    Left ventricular systolic function is normal. Estimated left ventricular EF = 55% Left ventricular ejection fraction appears to be 51 - 55%.    Left ventricular wall thickness is consistent with mild concentric hypertrophy.    Left ventricular diastolic function is consistent with (grade III w/high LAP) reversible restrictive pattern.    The right ventricular cavity is mild to moderately dilated.    The left atrial cavity is severely dilated.    The right atrial cavity is moderate to severely  dilated.    Abnormal mitral valve structure consistent with dilated annulus.    Moderate to severe mitral valve regurgitation is present with a centrally-directed jet noted.    Moderate tricuspid valve regurgitation is present.    Estimated right ventricular systolic pressure from tricuspid regurgitation is mildly elevated (35-45 mmHg).    There is a trivial pericardial effusion.    Procedure indication  Persistent symptomatic atrial fibrillation    conscious sedation administered by anesthesia  Timeout before procedure    consent obtained before procedure      Procedure note  after obtaining a valid consent patient was sedated by Anesthesia and a CHEMO probe was easily placed into esophagus with multiplane imaging with 2D, color and Doppler followed by bubble study with agitated saline without any complications    CHEMO  Findings    LV ejection fraction between 51 to 55%  Severe left atrial enlargement with mitral annular dilatation with moderate to severe central mitral regurgitation and moderate TR with mild pulm  hypertension  Trivial effusion noted  No shunt    Plan  Rate control only because of severe left enlargement and not a good candidate for rhythm control and ablation to be deferred  Increase the beta-blocker dose  Stop Cardizem  Outpatient follow-up    Complications none      Procedure done  Transesophageal echocardiography      Electronically signed by Baljeet Valero MD, 25, 3:12 PM EST.      STRESS TEST  Results for orders placed during the hospital encounter of 03/10/25    Stress Test With Myocardial Perfusion One Day    Interpretation Summary    Myocardial perfusion imaging indicates a normal myocardial perfusion study with no evidence of ischemia. Impressions are consistent with a low risk study.    Left ventricular ejection fraction is hyperdynamic (Calculated EF > 70%).    LEXISCAN CARDIOLITE REPORT    DATE OF PROCEDURE:  03/10/25    INDICATION FOR PROCEDURE:  Chest pain, CAD    PROCEDURE PERFORMED: Lexiscan Cardiolite    PROCEDURE COMMENTS:    After informed consent was obtained.  Patient's resting heart rate was 96 bpm, resting blood pressure was 150/98, resting EKG revealed sinus.  Patient was given 0.4 mg of regadenosine for stress testing.  There was no significant change in heart rate, blood pressure, symptoms with regadenoson injection.  Patient tolerated procedure well.  Complications were none.    NUCLEAR IMAGIN.  There was  uniform uptake of Cardiolite both in resting and post stress images, no ischemia seen.  2.  Gated images reveal  normal LV size and contractility, LVEF of 74%.    CONCLUSION:  1.  Lexiscan Cardiolite with  normal perfusion, negative for  ischemia.  2.  Normal wall motion .  LVEF of 74%.    RECOMMENDATIONS:    Clinical correlation recommended.      Nasir Celis MD  03/10/25  15:43 EDT        Cardiolite (Tc-99m sestamibi) stress test    HEART CATHETERIZATION  Results for orders placed during the hospital encounter of 21    Cardiac  Catheterization/Vascular Study    Narrative  CARDIAC CATHETERIZATION REPORT    DATE OF PROCEDURE: 4/2/2021      INDICATION FOR PROCEDURE: Abnormal stress test    PROCEDURE PERFORMED: Left heart catheterization, coronary angiography,    PROCEDURE COMMENTS:    All the risks and benefits explained with the patient informed consent was obtained from the patient.  Patient was brought to the cardiac catheterization laboratory placed on the table draped and prepped in the usual sterile fashion.  2% lidocaine was used to anesthetize the right groin area.  Using the modified Seldinger technique 5 St Helenian sheath was inserted into the right femoral artery.    5 St Helenian JL4 catheter was used to perform the selective left coronary angiography    5 St Helenian JR4 catheter was used to perform the selective right coronary angiography    Angio-Seal was placed after exchanging five St Helenian sheath with six St Helenian sheath    Patient tolerated procedure well without any immediate complications      FINDINGS:    1. HEMODYNAMICS:  LV end-diastolic pressure 7 mmHg, /80 mmHg.    2. LEFT VENTRICULOGRAPHY: Not done patient had echocardiogram    3. CORONARY ANGIOGRAPHY:  Dominance right  Left Main: Large-caliber vessel bifurcates into LAD circumflex artery 0% stenosis  LAD: Large-caliber vessel gives rise to couple of medium caliber diagonal arteries multiple septal branches reaches the apex mild luminal irregularities noted less than 10% stenosis  CIRC: Large-caliber vessel gives rise to marginal lateral branches less than 10% stenosis  RCA: Large-caliber dominant artery gives rise to PDA and PL branches less than 5 to 10% stenosis    SUMMARY: No obstructive coronary artery disease    RECOMMENDATIONS: Evaluate for noncardiac causes of symptoms aggressive cardiac risk factor modification        Imaging  Chest X-ray:   Imaging Results (Last 24 Hours)       Procedure Component Value Units Date/Time    XR Chest 1 View [[id number]] Collected:  03/10/25 0212     Updated: 03/10/25 0215    Narrative:      XR CHEST 1 VW    Date of Exam: 3/10/2025 1:45 AM EDT    Indication: pain    Comparison: Chest radiograph 9/30/2024    Findings:  There is a left-sided pacemaker device in place with leads over the right atrium and right ventricle. The heart is enlarged. The pulmonary vascular markings are normal. The lungs are clear.      Impression:      Impression:  Cardiomegaly. No active disease.          Electronically Signed: Quan Miguel MD    3/10/2025 2:13 AM EDT    Workstation ID: [id number]            Lab Review: I have reviewed the labs  Results from last 7 days   Lab Units 03/10/25  0307 03/10/25  0124   HSTROP T ng/L 8 9         Results from last 7 days   Lab Units 03/10/25  0124   SODIUM mmol/L 135*   POTASSIUM mmol/L 4.4   BUN mg/dL 29*   CREATININE mg/dL 0.89   CALCIUM mg/dL 9.6         Results from last 7 days   Lab Units 03/10/25  0124   PROBNP pg/mL 1,815.0*     Results from last 7 days   Lab Units 03/10/25  0124   WBC 10*3/mm3 7.98   HEMOGLOBIN g/dL 12.6   HEMATOCRIT % 38.9   PLATELETS 10*3/mm3 220                 Nasir Celis MD  3/10/2025, 22:08 EDT      EMR Dragon/Transcription:   Dictated utilizing Dragon dictation

## 2025-03-10 NOTE — THERAPY EVALUATION
Patient Name: Catalina Moreno  : 1940    MRN: [id number]                              Today's Date: 3/10/2025       Admit Date: 3/10/2025    Visit Dx:     ICD-10-CM ICD-9-CM   1. Atrial fibrillation with RVR  I48.91 427.31   2. Chest pain, unspecified type  R07.9 786.50   3. Weakness  R53.1 780.79     Patient Active Problem List   Diagnosis    Arthritis    Depressive disorder    Primary fibromyalgia syndrome    Hypothyroidism    Atrial fibrillation with RVR    Stage 3a chronic kidney disease    Chronic low back pain    Age-related cognitive decline    Generalized anxiety disorder    Polyneuropathy, unspecified    Restless legs syndrome    Essential (primary) hypertension    Gastro-esophageal reflux disease without esophagitis    Pulmonary hypertension, unspecified    Coronary artery disease involving native coronary artery of native heart without angina pectoris    Presence of cardiac pacemaker    Chronic HFrEF (heart failure with reduced ejection fraction)    Atrial flutter with rapid ventricular response    Weakness    AF (atrial fibrillation)    Chronic heart failure with preserved ejection fraction (HFpEF)    Fall    Nasal bones, closed fracture    Iron deficiency anemia    Atrial fibrillation    Chest pain     Past Medical History:   Diagnosis Date    Acquired spondylolisthesis of lumbosacral region     Acute kidney injury 2022    Anxiety     Arthritis     Atrial fibrillation     paroxysmal    Broken shoulder     left-- due to fall 20 was at [facility]    Chronic pain disorder     Chronic systolic CHF (congestive heart failure) 2023    EF 36-40%    Coronary artery disease involving native coronary artery of native heart without angina pectoris 2021    nonobstructive    DDD (degenerative disc disease), cervical     DDD (degenerative disc disease), lumbar     Depression     Edema     2020 foot    GERD (gastroesophageal reflux disease)     H/O fall     Heart failure with mid-range  ejection fraction 03/05/2022    EF 45% per 2D echo    Hypertension     Hypothyroidism     Insomnia     Low back pain     Moderate mitral regurgitation 01/05/2023    Neuropathy     Nonrheumatic tricuspid valve regurgitation     Pain in both feet     Polypharmacy     PONV (postoperative nausea and vomiting)     Pulmonary hypertension     Radiculopathy     Restless legs     Sacroiliitis     Sick sinus syndrome     Added automatically from request for surgery [id number]    Spondylolisthesis     Stage 3a chronic kidney disease     Urinary incontinence     Vitamin D deficiency      Past Surgical History:   Procedure Laterality Date    BACK SURGERY  07/19/2018    PDC &  PSF L3-L5 insitu    CARDIAC CATHETERIZATION N/A 4/2/2021    Procedure: Cardiac Catheterization/Vascular Study;  Surgeon: Barrett Evans MD;  Location: [facility] CATH INVASIVE LOCATION;  Service: Cardiovascular;  Laterality: N/A;    CARDIAC ELECTROPHYSIOLOGY PROCEDURE N/A 1/17/2023    Procedure: Pacemaker DC new;  Surgeon: Baljeet Valero MD;  Location: [facility] CATH INVASIVE LOCATION;  Service: Cardiovascular;  Laterality: N/A;    CHOLECYSTECTOMY      COLONOSCOPY      COLONOSCOPY N/A 7/10/2024    Procedure: COLONOSCOPY with cold snare polypectomy x1 and endoscopic clipping x2;  Surgeon: MARLIN Vanegas MD;  Location: [facility] ENDOSCOPY;  Service: Gastroenterology;  Laterality: N/A;  Post- colon polyps, diverticulosis    ENDOSCOPY N/A 9/14/2023    Procedure: ESOPHAGOGASTRODUODENOSCOPY;  Surgeon: Ulysses Lamas MD;  Location: [facility] ENDOSCOPY;  Service: Gastroenterology;  Laterality: N/A;  gastritis, inflammatory gastric polyp    ENDOSCOPY N/A 7/10/2024    Procedure: ESOPHAGOGASTRODUODENOSCOPY;  Surgeon: MARLIN Vanegas MD;  Location: [facility] ENDOSCOPY;  Service: Gastroenterology;  Laterality: N/A;  Post- hiatal hernia, gastric polyps, gastritis    HYSTERECTOMY      ROTATOR CUFF REPAIR Left       General Information       Row Name 03/10/25  1327          Physical Therapy Time and Intention    Document Type evaluation  -AM     Mode of Treatment physical therapy  -AM       Row Name 03/10/25 1327          General Information    Patient Profile Reviewed yes  -AM     Prior Level of Function independent:;all household mobility;community mobility;gait;transfer;bed mobility  ambulates with rollator  -AM     Existing Precautions/Restrictions fall  -AM     Barriers to Rehab medically complex  -AM       Row Name 03/10/25 1327          Living Environment    Current Living Arrangements home  -AM     People in Home alone  -AM       Row Name 03/10/25 1327          Home Main Entrance    Number of Stairs, Main Entrance none  -AM       Row Name 03/10/25 1327          Stairs Within Home, Primary    Number of Stairs, Within Home, Primary none  -AM       Row Name 03/10/25 1327          Cognition    Orientation Status (Cognition) oriented x 4  -AM       Row Name 03/10/25 1327          Safety Issues/Impairments Affecting Functional Mobility    Impairments Affecting Function (Mobility) balance;endurance/activity tolerance;strength  -AM     Comment, Safety Issues/Impairments (Mobility) gait belt utilized  -AM               User Key  (r) = Recorded By, (t) = Taken By, (c) = Cosigned By      Initials Name Provider Type    AM Can Gaspar, PT Physical Therapist                   Mobility       Row Name 03/10/25 1328          Bed Mobility    Bed Mobility bed mobility (all) activities  -AM     All Activities, [place] (Bed Mobility) independent  -AM       Row Name 03/10/25 1328          Sit-Stand Transfer    Sit-Stand [place] (Transfers) contact guard;1 person assist  -AM     Assistive Device (Sit-Stand Transfers) walker, front-wheeled  -AM       Row Name 03/10/25 1328          Gait/Stairs (Locomotion)    [place] Level (Gait) contact guard  -AM     Assistive Device (Gait) walker, front-wheeled  -AM     Distance in Feet (Gait) 60  -AM     Deviations/Abnormal Patterns  (Gait) chad decreased;gait speed decreased  -AM     Comment, (Gait/Stairs) decreased endurance  -AM               User Key  (r) = Recorded By, (t) = Taken By, (c) = Cosigned By      Initials Name Provider Type    AM Can Gaspar PT Physical Therapist                   Obj/Interventions    No documentation.                  Goals/Plan       Row Name 03/10/25 1332          Transfer Goal 1 (PT)    Activity/Assistive Device (Transfer Goal 1, PT) transfers, all;walker, rolling  -AM     [place] Level/Cues Needed (Transfer Goal 1, PT) modified independence  -AM     Time Frame (Transfer Goal 1, PT) long term goal (LTG)  -AM       Row Name 03/10/25 1332          Gait Training Goal 1 (PT)    Activity/Assistive Device (Gait Training Goal 1, PT) gait (walking locomotion);walker, rolling  -AM     [place] Level (Gait Training Goal 1, PT) modified independence  -AM     Distance (Gait Training Goal 1, PT) 150'  -AM     Time Frame (Gait Training Goal 1, PT) long term goal (LTG)  -AM       Row Name 03/10/25 1332          Therapy Assessment/Plan (PT)    Planned Therapy Interventions (PT) balance training;gait training;strengthening;patient/family education;transfer training  -AM               User Key  (r) = Recorded By, (t) = Taken By, (c) = Cosigned By      Initials Name Provider Type    AM Can Gaspar PT Physical Therapist                   Clinical Impression       Row Name 03/10/25 1329          Pain    Pretreatment Pain Rating 0/10 - no pain  -AM     Posttreatment Pain Rating 0/10 - no pain  -AM       Row Name 03/10/25 1329          Plan of Care Review    Plan of Care Reviewed With patient  -AM     Outcome Evaluation Pt is a 85 y/o female wtih c/o weakness and chest discomfort.  Chest X-ray; cardiomegaly.  Pt reports she lives alone in a 1 story home with no steps to enter into home.  Pt ambulated with rollator prior to hospitalization.  Pt on room air and telemetry this date.  Pt was independent with bed  mobility and CGA for transfers with RW.  Pt ambulated 60' with RW wtih CGA with decreased endurance.  Recommend HHPT.  -AM       Row Name 03/10/25 1329          Therapy Assessment/Plan (PT)    Patient/Family Therapy Goals Statement (PT) To go home  -AM     Rehab Potential (PT) good  -AM     Criteria for Skilled Interventions Met (PT) yes  -AM     Therapy Frequency (PT) 3 times/wk  -AM     Predicted Duration of Therapy Intervention (PT) until d/c  -AM       Row Name 03/10/25 1329          Vital Signs    O2 Delivery Pre Treatment room air  -AM     O2 Delivery Intra Treatment room air  -AM     O2 Delivery Post Treatment room air  -AM     Pre Patient Position Supine  -AM     Intra Patient Position Standing  -AM     Post Patient Position Supine  -AM       Row Name 03/10/25 1329          Positioning and Restraints    Pre-Treatment Position in bed  -AM     Post Treatment Position bed  -AM     In Bed notified nsg;supine;call light within reach;encouraged to call for assist;exit alarm on  -AM               User Key  (r) = Recorded By, (t) = Taken By, (c) = Cosigned By      Initials Name Provider Type    AM Can Gaspar, PT Physical Therapist                   Outcome Measures       Row Name 03/10/25 1332 03/10/25 0728       How much help from another person do you currently need...    Turning from your back to your side while in flat bed without using bedrails? 4  -AM 4  -NC    Moving from lying on back to sitting on the side of a flat bed without bedrails? 4  -AM 4  -NC    Moving to and from a bed to a chair (including a wheelchair)? 3  -AM 4  -NC    Standing up from a chair using your arms (e.g., wheelchair, bedside chair)? 3  -AM 4  -NC    Climbing 3-5 steps with a railing? 2  -AM 4  -NC    To walk in hospital room? 3  -AM 3  -NC    AM-PAC 6 Clicks Score (PT) 19  -AM 23  -NC      Row Name 03/10/25 1022          Functional Assessment    Outcome Measure Options AM-PAC 6 Clicks Daily Activity (OT)  -SP               User  Key  (r) = Recorded By, (t) = Taken By, (c) = Cosigned By      Initials Name Provider Type    AM Can Gaspar, DEBBI Physical Therapist    Choco Orellana, OT Occupational Therapist    Anna Gotti RN Registered Nurse                                 Physical Therapy Education       Title: PT OT SLP Therapies (Done)       Topic: Physical Therapy (Done)       Point: Mobility training (Done)       Learning Progress Summary            Patient Acceptance, E,TB, VU by AM at 3/10/2025 1333                      Point: Body mechanics (Done)       Learning Progress Summary            Patient Acceptance, E,TB, VU by AM at 3/10/2025 1333                      Point: Precautions (Done)       Learning Progress Summary            Patient Acceptance, E,TB, VU by AM at 3/10/2025 1333                                      User Key       Initials Effective Dates Name Provider Type Discipline    AM 05/10/21 -  Can Gaspar PT Physical Therapist PT                  PT Recommendation and Plan  Planned Therapy Interventions (PT): balance training, gait training, strengthening, patient/family education, transfer training  Outcome Evaluation: Pt is a 83 y/o female wtih c/o weakness and chest discomfort.  Chest X-ray; cardiomegaly.  Pt reports she lives alone in a 1 story home with no steps to enter into home.  Pt ambulated with rollator prior to hospitalization.  Pt on room air and telemetry this date.  Pt was independent with bed mobility and CGA for transfers with RW.  Pt ambulated 60' with RW wt CGA with decreased endurance.  Recommend HHPT.     Time Calculation:         PT Charges       Row Name 03/10/25 1333             Time Calculation    Start Time 1040  -AM      Stop Time 1057  -AM      Time Calculation (min) 17 min  -AM      PT Received On 03/10/25  -AM      PT - Next Appointment 03/12/25  -AM      PT Goal Re-Cert Due Date 03/24/25  -AM                User Key  (r) = Recorded By, (t) = Taken By, (c) = Cosigned By       Initials Name Provider Type    AM Can Gaspar, PT Physical Therapist                  Therapy Charges for Today       Code Description Service Date Service Provider Modifiers Qty    49011433815 HC PT EVAL MOD COMPLEXITY 3 3/10/2025 Can Gaspar, PT GP 1            PT G-Codes  Outcome Measure Options: AM-PAC 6 Clicks Daily Activity (OT)  AM-PAC 6 Clicks Score (PT): 19  AM-PAC 6 Clicks Score (OT): 21       Can Gaspar, PT  3/10/2025

## 2025-03-10 NOTE — PLAN OF CARE
Goal Outcome Evaluation:  Plan of Care Reviewed With: patient           Outcome Evaluation: Pt is a 85 y/o female wtih c/o weakness and chest discomfort.  Chest X-ray; cardiomegaly.  Pt reports she lives alone in a 1 story home with no steps to enter into home.  Pt ambulated with rollator prior to hospitalization.  Pt on room air and telemetry this date.  Pt was independent with bed mobility and CGA for transfers with RW.  Pt ambulated 60' with RW wt CGA with decreased endurance.  Recommend HHPT.

## 2025-03-10 NOTE — DISCHARGE SUMMARY
"[facility]    Liv Duron, KALYANI    CHIEF COMPLAINT:     Chest pain     HISTORY OF PRESENT ILLNESS:    HPI  ED 03/10/2025  84-year-old female presents with chest pain. She describes it as a pressure off-and-on today. She has had some nausea shortness of breath. She has had no diaphoresis. No complaints of leg pain or edema today. She has had no fever.     Observation 03/10/2025  Patient agrees with HPI noted above. Describes chest discomfort as \"heavy feeling\". Feeling better now but reports weakness. Lives alone at her house.     Past Medical History:   Diagnosis Date    Acquired spondylolisthesis of lumbosacral region     Acute kidney injury 07/22/2022    Anxiety     Arthritis     Atrial fibrillation     paroxysmal    Broken shoulder     left-- due to fall 11-7-19 was at [facility]    Chronic pain disorder     Chronic systolic CHF (congestive heart failure) 01/05/2023    EF 36-40%    Coronary artery disease involving native coronary artery of native heart without angina pectoris 04/02/2021    nonobstructive    DDD (degenerative disc disease), cervical     DDD (degenerative disc disease), lumbar     Depression     Edema     9/2020 foot    GERD (gastroesophageal reflux disease)     H/O fall     Heart failure with mid-range ejection fraction 03/05/2022    EF 45% per 2D echo    Hypertension     Hypothyroidism     Insomnia     Low back pain     Moderate mitral regurgitation 01/05/2023    Neuropathy     Nonrheumatic tricuspid valve regurgitation     Pain in both feet     Polypharmacy     PONV (postoperative nausea and vomiting)     Pulmonary hypertension     Radiculopathy     Restless legs     Sacroiliitis     Sick sinus syndrome     Added automatically from request for surgery [id number]    Spondylolisthesis     Stage 3a chronic kidney disease     Urinary incontinence     Vitamin D deficiency      Past Surgical History:   Procedure Laterality Date    BACK SURGERY  07/19/2018    PDC &  PSF L3-L5 insitu "    CARDIAC CATHETERIZATION N/A 4/2/2021    Procedure: Cardiac Catheterization/Vascular Study;  Surgeon: Barrett Evans MD;  Location: [facility] CATH INVASIVE LOCATION;  Service: Cardiovascular;  Laterality: N/A;    CARDIAC ELECTROPHYSIOLOGY PROCEDURE N/A 1/17/2023    Procedure: Pacemaker DC new;  Surgeon: Baljeet Valero MD;  Location: [facility] CATH INVASIVE LOCATION;  Service: Cardiovascular;  Laterality: N/A;    CHOLECYSTECTOMY      COLONOSCOPY      COLONOSCOPY N/A 7/10/2024    Procedure: COLONOSCOPY with cold snare polypectomy x1 and endoscopic clipping x2;  Surgeon: MARLIN Vanegas MD;  Location: [facility] ENDOSCOPY;  Service: Gastroenterology;  Laterality: N/A;  Post- colon polyps, diverticulosis    ENDOSCOPY N/A 9/14/2023    Procedure: ESOPHAGOGASTRODUODENOSCOPY;  Surgeon: Ulysses Lamas MD;  Location: [facility] ENDOSCOPY;  Service: Gastroenterology;  Laterality: N/A;  gastritis, inflammatory gastric polyp    ENDOSCOPY N/A 7/10/2024    Procedure: ESOPHAGOGASTRODUODENOSCOPY;  Surgeon: MARLIN Vanegas MD;  Location: [facility] ENDOSCOPY;  Service: Gastroenterology;  Laterality: N/A;  Post- hiatal hernia, gastric polyps, gastritis    HYSTERECTOMY      ROTATOR CUFF REPAIR Left      Family History   Problem Relation Age of Onset    Cancer Father     Diabetes Son     Cancer Paternal Aunt     Heart disease Paternal Aunt     Cancer Paternal Uncle      Social History     Tobacco Use    Smoking status: Never     Passive exposure: Never    Smokeless tobacco: Never   Vaping Use    Vaping status: Never Used   Substance Use Topics    Alcohol use: No    Drug use: Never     Medications Prior to Admission   Medication Sig Dispense Refill Last Dose/Taking    apixaban (Eliquis) 5 MG tablet tablet TAKE 1 TABLET BY MOUTH EVERY 12 HOURS FOR ATRIAL FIBRILLATION 60 tablet 1 Taking    dilTIAZem (CARDIZEM) 30 MG tablet Take 1 tablet by mouth 2 (Two) Times a Day.   Taking    ferrous gluconate (FERGON) 324 MG tablet Take 1  tablet by mouth Every Other Day. 45 tablet 1 Taking    levothyroxine (SYNTHROID, LEVOTHROID) 50 MCG tablet Take 1 tablet by mouth Daily. 90 tablet 1 Taking    Magnesium 250 MG capsule Take 1 capsule by mouth Daily.   Taking    metoprolol succinate XL (TOPROL-XL) 100 MG 24 hr tablet Take 1 tablet by mouth Daily. 30 tablet 2 Taking    pantoprazole (PROTONIX) 40 MG EC tablet TAKE 1 TABLET BY MOUTH EVERY DAY 90 tablet 1 Taking    spironolactone (ALDACTONE) 25 MG tablet Take 1 tablet by mouth Daily.   Taking    Vitamin D, Cholecalciferol, (CHOLECALCIFEROL) 10 MCG (400 UNIT) tablet Take 1 tablet by mouth Daily.   Taking     Allergies:  Baclofen, Codeine, Ibuprofen, Methocarbamol, Naproxen, Tizanidine hcl, and Tolmetin    Immunization History   Administered Date(s) Administered    COVID-19 (MODERNA) 1st,2nd,3rd Dose Monovalent 02/25/2021, 03/29/2021, 09/16/2021    Fluzone  >6mos 10/19/2018    Fluzone (or Fluarix & Flulaval for VFC) >6mos 03/10/2022    Fluzone High-Dose 65+YRS 11/25/2019    Fluzone High-Dose 65+yrs 11/05/2020, 11/30/2021    Fluzone Quad >6mos (Multi-dose) 10/19/2018    Pneumococcal Conjugate 13-Valent (PCV13) 08/24/2017    Pneumococcal Polysaccharide (PPSV23) 03/28/2019    Tdap 12/06/2019, 03/21/2024           REVIEW OF SYSTEMS:    Review of Systems   Constitutional: Positive for malaise/fatigue.   Cardiovascular:  Positive for chest pain.   Gastrointestinal:  Negative for nausea and vomiting.   All other systems reviewed and are negative.        Vital Signs  Temp:  [97.6 °F (36.4 °C)-99.1 °F (37.3 °C)] 97.9 °F (36.6 °C)  Heart Rate:  [] 92  Resp:  [14-17] 14  BP: (109-169)/() 125/64          Physical Exam:  Physical Exam  Vitals and nursing note reviewed.   Constitutional:       Appearance: Normal appearance. She is obese.   HENT:      Head: Normocephalic and atraumatic.      Right Ear: External ear normal.      Left Ear: External ear normal.      Nose: Nose normal.      Mouth/Throat:       Mouth: Mucous membranes are moist.      Pharynx: Oropharynx is clear.   Eyes:      Extraocular Movements: Extraocular movements intact.   Cardiovascular:      Rate and Rhythm: Normal rate and regular rhythm.      Pulses: Normal pulses.      Heart sounds: Normal heart sounds.   Pulmonary:      Effort: Pulmonary effort is normal.      Breath sounds: Normal breath sounds.   Abdominal:      General: Abdomen is flat. Bowel sounds are normal.      Palpations: Abdomen is soft.   Musculoskeletal:         General: Normal range of motion.      Cervical back: Normal range of motion.   Skin:     General: Skin is warm.   Neurological:      General: No focal deficit present.      Mental Status: She is alert and oriented to person, place, and time.   Psychiatric:         Mood and Affect: Mood normal.         Behavior: Behavior normal.           Emotional Behavior:    Normal   Debilities:   None  Results Review:    I reviewed the patient's new clinical results.  Lab Results (most recent)       Procedure Component Value Units Date/Time    Respiratory Panel PCR w/COVID-19(SARS-CoV-2) WENDI/MIKAYLA/KEVEN/PAD/COR/DWIGHT In-House, NP Swab in UTM/VTM, 2 HR TAT - Swab, Nasopharynx [[id number]]  (Normal) Collected: 03/10/25 0953    Specimen: Swab from Nasopharynx Updated: 03/10/25 1118     ADENOVIRUS, PCR Not Detected     Coronavirus 229E Not Detected     Coronavirus HKU1 Not Detected     Coronavirus NL63 Not Detected     Coronavirus OC43 Not Detected     COVID19 Not Detected     Human Metapneumovirus Not Detected     Human Rhinovirus/Enterovirus Not Detected     Influenza A PCR Not Detected     Influenza B PCR Not Detected     Parainfluenza Virus 1 Not Detected     Parainfluenza Virus 2 Not Detected     Parainfluenza Virus 3 Not Detected     Parainfluenza Virus 4 Not Detected     RSV, PCR Not Detected     Bordetella pertussis pcr Not Detected     Bordetella parapertussis PCR Not Detected     Chlamydophila pneumoniae PCR Not Detected     Mycoplasma  pneumo by PCR Not Detected    Narrative:      In the setting of a positive respiratory panel with a viral infection PLUS a negative procalcitonin without other underlying concern for bacterial infection, consider observing off antibiotics or discontinuation of antibiotics and continue supportive care. If the respiratory panel is positive for atypical bacterial infection (Bordetella pertussis, Chlamydophila pneumoniae, or Mycoplasma pneumoniae), consider antibiotic de-escalation to target atypical bacterial infection.    High Sensitivity Troponin T 1Hr [[id number]]  (Normal) Collected: 03/10/25 0307    Specimen: Blood Updated: 03/10/25 0335     HS Troponin T 8 ng/L      Troponin T Numeric Delta -1 ng/L     Narrative:      High Sensitive Troponin T Reference Range:  <14.0 ng/L- Negative Female for AMI  <22.0 ng/L- Negative Male for AMI  >=14 - Abnormal Female indicating possible myocardial injury.  >=22 - Abnormal Male indicating possible myocardial injury.   Clinicians would have to utilize clinical acumen, EKG, Troponin, and serial changes to determine if it is an Acute Myocardial Infarction or myocardial injury due to an underlying chronic condition.         Comprehensive Metabolic Panel [[id number]]  (Abnormal) Collected: 03/10/25 0124    Specimen: Blood from Arm, Left Updated: 03/10/25 0152     Glucose 106 mg/dL      BUN 29 mg/dL      Creatinine 0.89 mg/dL      Sodium 135 mmol/L      Potassium 4.4 mmol/L      Chloride 98 mmol/L      CO2 25.6 mmol/L      Calcium 9.6 mg/dL      Total Protein 7.4 g/dL      Albumin 4.2 g/dL      ALT (SGPT) 7 U/L      AST (SGOT) 20 U/L      Alkaline Phosphatase 84 U/L      Total Bilirubin 0.7 mg/dL      Globulin 3.2 gm/dL      A/G Ratio 1.3 g/dL      BUN/Creatinine Ratio 32.6     Anion Gap 11.4 mmol/L      eGFR 64.4 mL/min/1.73     Narrative:      GFR Categories in Chronic Kidney Disease (CKD)      GFR Category          GFR (mL/min/1.73)    Interpretation  G1                     90 or  greater         Normal or high (1)  G2                      60-89                Mild decrease (1)  G3a                   45-59                Mild to moderate decrease  G3b                   30-44                Moderate to severe decrease  G4                    15-29                Severe decrease  G5                    14 or less           Kidney failure          (1)In the absence of evidence of kidney disease, neither GFR category G1 or G2 fulfill the criteria for CKD.    eGFR calculation 2021 CKD-EPI creatinine equation, which does not include race as a factor    High Sensitivity Troponin T [[id number]]  (Normal) Collected: 03/10/25 0124    Specimen: Blood from Arm, Left Updated: 03/10/25 0152     HS Troponin T 9 ng/L     Narrative:      High Sensitive Troponin T Reference Range:  <14.0 ng/L- Negative Female for AMI  <22.0 ng/L- Negative Male for AMI  >=14 - Abnormal Female indicating possible myocardial injury.  >=22 - Abnormal Male indicating possible myocardial injury.   Clinicians would have to utilize clinical acumen, EKG, Troponin, and serial changes to determine if it is an Acute Myocardial Infarction or myocardial injury due to an underlying chronic condition.         BNP [[id number]]  (Abnormal) Collected: 03/10/25 0124    Specimen: Blood from Arm, Left Updated: 03/10/25 0152     proBNP 1,815.0 pg/mL     Narrative:      This assay is used as an aid in the diagnosis of individuals suspected of having heart failure. It can be used as an aid in the diagnosis of acute decompensated heart failure (ADHF) in patients presenting with signs and symptoms of ADHF to the emergency department (ED). In addition, NT-proBNP of <300 pg/mL indicates ADHF is not likely.    Age Range Result Interpretation  NT-proBNP Concentration (pg/mL:      <50             Positive            >450                   [place]                 300-450                    Negative             <300    50-75           Positive            >900                   [place]                300-900                  Negative            <300      >75             Positive            >1800                  Gray                300-1800                  Negative            <300    CBC & Differential [[id number]]  (Normal) Collected: 03/10/25 0124    Specimen: Blood from Arm, Left Updated: 03/10/25 0139    Narrative:      The following orders were created for panel order CBC & Differential.  Procedure                               Abnormality         Status                     ---------                               -----------         ------                     CBC Auto Differential[[id number]]        Normal              Final result                 Please view results for these tests on the individual orders.    CBC Auto Differential [[id number]]  (Normal) Collected: 03/10/25 0124    Specimen: Blood from Arm, Left Updated: 03/10/25 0139     WBC 7.98 10*3/mm3      RBC 4.08 10*6/mm3      Hemoglobin 12.6 g/dL      Hematocrit 38.9 %      MCV 95.3 fL      MCH 30.9 pg      MCHC 32.4 g/dL      RDW 13.9 %      RDW-SD 48.7 fl      MPV 9.6 fL      Platelets 220 10*3/mm3      Neutrophil % 67.1 %      Lymphocyte % 22.1 %      Monocyte % 7.5 %      Eosinophil % 2.0 %      Basophil % 0.9 %      Immature Grans % 0.4 %      Neutrophils, Absolute 5.36 10*3/mm3      Lymphocytes, Absolute 1.76 10*3/mm3      Monocytes, Absolute 0.60 10*3/mm3      Eosinophils, Absolute 0.16 10*3/mm3      Basophils, Absolute 0.07 10*3/mm3      Immature Grans, Absolute 0.03 10*3/mm3      nRBC 0.0 /100 WBC     [place] Draw [[id number]] Collected: 03/10/25 0124    Specimen: Blood from Arm, Left Updated: 03/10/25 0130    Narrative:      The following orders were created for panel order [place] Draw.  Procedure                               Abnormality         Status                     ---------                               -----------         ------                     Green Top (Gel)[[id number]]                                   Final result               Lavender Top[[id number]]                                     Final result               Gold Top - SST[[id number]]                                   Final result               Light Blue Top[[id number]]                                   Final result                 Please view results for these tests on the individual orders.    Green Top (Gel) [[id number]] Collected: 03/10/25 0124    Specimen: Blood from Arm, Left Updated: 03/10/25 0130     Extra Tube Hold for add-ons.     Comment: Auto resulted.       Lavender Top [[id number]] Collected: 03/10/25 0124    Specimen: Blood from Arm, Left Updated: 03/10/25 0130     Extra Tube hold for add-on     Comment: Auto resulted       Gold Top - SST [[id number]] Collected: 03/10/25 0124    Specimen: Blood from Arm, Left Updated: 03/10/25 0130     Extra Tube Hold for add-ons.     Comment: Auto resulted.       Light Blue Top [[id number]] Collected: 03/10/25 0124    Specimen: Blood from Arm, Left Updated: 03/10/25 0130     Extra Tube Hold for add-ons.     Comment: Auto resulted               Imaging Results (Most Recent)       Procedure Component Value Units Date/Time    XR Chest 1 View [[id number]] Collected: 03/10/25 0212     Updated: 03/10/25 0215    Narrative:      XR CHEST 1 VW    Date of Exam: 3/10/2025 1:45 AM EDT    Indication: pain    Comparison: Chest radiograph 9/30/2024    Findings:  There is a left-sided pacemaker device in place with leads over the right atrium and right ventricle. The heart is enlarged. The pulmonary vascular markings are normal. The lungs are clear.      Impression:      Impression:  Cardiomegaly. No active disease.          Electronically Signed: Quan Miguel MD    3/10/2025 2:13 AM EDT    Workstation ID: [id number]          reviewed    ECG/EMG Results (most recent)       Procedure Component Value Units Date/Time    ECG 12 Lead Chest Pain [[id number]] Collected: 03/10/25 0013     Updated: 03/10/25 0014     QT Interval 393 ms       QTC Interval 478 ms     Narrative:      HEART RATE=89  bpm  RR Fsvbphni=614  ms  MA Interval=  ms  P Horizontal Axis=  deg  P Front Axis=  deg  QRSD Interval=68  ms  QT Kzizptxo=598  ms  RJjQ=111  ms  QRS Axis=63  deg  T Wave Axis=69  deg  - ABNORMAL ECG -  Afib/flut and V-paced complexes  Date and Time of Study:2025-03-10 00:13:26    Telemetry Scan [[id number]] Resulted: 03/10/25     Updated: 03/10/25 1115    Telemetry Scan [[id number]] Resulted: 03/10/25     Updated: 03/10/25 1209    Telemetry Scan [[id number]] Resulted: 03/10/25     Updated: 03/10/25 1256          reviewed    Results for orders placed during the hospital encounter of 03/04/22    Duplex Venous Lower Extremity - Bilateral CAR    Interpretation Summary  · Normal bilateral lower extremity venous duplex scan.  · Nonvascular cystic mass located in the left posterior mid calf.      Results for orders placed during the hospital encounter of 01/29/25    Adult Transesophageal Echo (CHEMO) W/ Cont if Necessary Per Protocol    Interpretation Summary    Left ventricular systolic function is normal. Estimated left ventricular EF = 55% Left ventricular ejection fraction appears to be 51 - 55%.    Left ventricular wall thickness is consistent with mild concentric hypertrophy.    Left ventricular diastolic function is consistent with (grade III w/high LAP) reversible restrictive pattern.    The right ventricular cavity is mild to moderately dilated.    The left atrial cavity is severely dilated.    The right atrial cavity is moderate to severely  dilated.    Abnormal mitral valve structure consistent with dilated annulus.    Moderate to severe mitral valve regurgitation is present with a centrally-directed jet noted.    Moderate tricuspid valve regurgitation is present.    Estimated right ventricular systolic pressure from tricuspid regurgitation is mildly elevated (35-45 mmHg).    There is a trivial pericardial effusion.    Procedure indication  Persistent  symptomatic atrial fibrillation    conscious sedation administered by anesthesia  Timeout before procedure    consent obtained before procedure      Procedure note  after obtaining a valid consent patient was sedated by Anesthesia and a CHEMO probe was easily placed into esophagus with multiplane imaging with 2D, color and Doppler followed by bubble study with agitated saline without any complications    CHEMO  Findings    LV ejection fraction between 51 to 55%  Severe left atrial enlargement with mitral annular dilatation with moderate to severe central mitral regurgitation and moderate TR with mild pulm hypertension  Trivial effusion noted  No shunt    Plan  Rate control only because of severe left enlargement and not a good candidate for rhythm control and ablation to be deferred  Increase the beta-blocker dose  Stop Cardizem  Outpatient follow-up    Complications none      Procedure done  Transesophageal echocardiography      Electronically signed by Baljeet Valero MD, 01/29/25, 3:12 PM EST.      Microbiology Results (last 10 days)       Procedure Component Value - Date/Time    Respiratory Panel PCR w/COVID-19(SARS-CoV-2) WENDI/MIKAYLA/KEVEN/PAD/COR/DWIGHT In-House, NP Swab in UTM/VTM, 2 HR TAT - Swab, Nasopharynx [[id number]]  (Normal) Collected: 03/10/25 0953    Lab Status: Final result Specimen: Swab from Nasopharynx Updated: 03/10/25 1118     ADENOVIRUS, PCR Not Detected     Coronavirus 229E Not Detected     Coronavirus HKU1 Not Detected     Coronavirus NL63 Not Detected     Coronavirus OC43 Not Detected     COVID19 Not Detected     Human Metapneumovirus Not Detected     Human Rhinovirus/Enterovirus Not Detected     Influenza A PCR Not Detected     Influenza B PCR Not Detected     Parainfluenza Virus 1 Not Detected     Parainfluenza Virus 2 Not Detected     Parainfluenza Virus 3 Not Detected     Parainfluenza Virus 4 Not Detected     RSV, PCR Not Detected     Bordetella pertussis pcr Not Detected     Bordetella  parapertussis PCR Not Detected     Chlamydophila pneumoniae PCR Not Detected     Mycoplasma pneumo by PCR Not Detected    Narrative:      In the setting of a positive respiratory panel with a viral infection PLUS a negative procalcitonin without other underlying concern for bacterial infection, consider observing off antibiotics or discontinuation of antibiotics and continue supportive care. If the respiratory panel is positive for atypical bacterial infection (Bordetella pertussis, Chlamydophila pneumoniae, or Mycoplasma pneumoniae), consider antibiotic de-escalation to target atypical bacterial infection.            Assessment & Plan     Chest pain        Chest pain  Lab Results   Component Value Date    TROPONINT 8 03/10/2025    TROPONINT 9 03/10/2025    TROPONINT 12 10/01/2024   -Troponin unremarkable  -BNP 1815  -CMP and CBC generally unremarkable  -Chest x-ray showed cardiomegaly but no active disease  -EKG showed A-fib with heart rate 89  -Cardiology consulted and recommended stress test  -Stress test negative for ischemia   -Recent echo showed EF 55%  -Telemetry  -PT/OT recommended HH     Heart failure with preserved ejection fraction   Lab Results   Component Value Date    TROPONINT 8 03/10/2025    TROPONINT 9 03/10/2025    PROBNP 1,815.0 (H) 03/10/2025    PROBNP 1,449.0 09/30/2024     (L) 03/10/2025   -Recent echo showed EF 55%  -Continue Cardizem, metoprolol and Aldactone   -Monitor I's and O's and daily weights  -2 g sodium diet     A-fib  -Continue Cardizem and metoprolol  -Eliquis held depending stress test results    GERD  -Continue PPI    Hypothyroidism  -Continue Synthroid  -TSH elevated but T4 wnr    Obesity  -BMI 31.25  -Lifestyle modifications    I discussed the patients findings and my recommendations with patient and nursing staff.     Discharge Diagnosis:      Chest pain      Hospital Course  Patient is a 84 y.o. female presented with chest pain as noted on HPI above.  Troponin, CBC, BMP  unremarkable.  BNP 1815 and chest x-ray showed cardiomegaly but no active disease.  EKG showed A-fib which is chronic and heart rate 89.  Cardiology was consulted and patient was admitted to the observation unit for stress test.  Stress test was negative for ischemia.  Recent echo showed EF 55%.  PT/OT consulted recommended home health.  She will be following up with PCP and cardiologist outpatient.  She was started on statin. At this time, patient felt to be in good condition for discharge with close follow up with PCP. Instructed to take all medications as prescribed and to return to ED if any concerning signs/symptoms. All test/lab results were discussed with patient. All questions were answered and patient verbalizes understanding.   .      Past Medical History:     Past Medical History:   Diagnosis Date    Acquired spondylolisthesis of lumbosacral region     Acute kidney injury 07/22/2022    Anxiety     Arthritis     Atrial fibrillation     paroxysmal    Broken shoulder     left-- due to fall 11-7-19 was at [facility]    Chronic pain disorder     Chronic systolic CHF (congestive heart failure) 01/05/2023    EF 36-40%    Coronary artery disease involving native coronary artery of native heart without angina pectoris 04/02/2021    nonobstructive    DDD (degenerative disc disease), cervical     DDD (degenerative disc disease), lumbar     Depression     Edema     9/2020 foot    GERD (gastroesophageal reflux disease)     H/O fall     Heart failure with mid-range ejection fraction 03/05/2022    EF 45% per 2D echo    Hypertension     Hypothyroidism     Insomnia     Low back pain     Moderate mitral regurgitation 01/05/2023    Neuropathy     Nonrheumatic tricuspid valve regurgitation     Pain in both feet     Polypharmacy     PONV (postoperative nausea and vomiting)     Pulmonary hypertension     Radiculopathy     Restless legs     Sacroiliitis     Sick sinus syndrome     Added automatically from request for surgery [id number]     Spondylolisthesis     Stage 3a chronic kidney disease     Urinary incontinence     Vitamin D deficiency        Past Surgical History:     Past Surgical History:   Procedure Laterality Date    BACK SURGERY  07/19/2018    PDC &  PSF L3-L5 insitu    CARDIAC CATHETERIZATION N/A 4/2/2021    Procedure: Cardiac Catheterization/Vascular Study;  Surgeon: Barrett Evans MD;  Location: [facility] CATH INVASIVE LOCATION;  Service: Cardiovascular;  Laterality: N/A;    CARDIAC ELECTROPHYSIOLOGY PROCEDURE N/A 1/17/2023    Procedure: Pacemaker DC new;  Surgeon: Baljeet Valero MD;  Location: [facility] CATH INVASIVE LOCATION;  Service: Cardiovascular;  Laterality: N/A;    CHOLECYSTECTOMY      COLONOSCOPY      COLONOSCOPY N/A 7/10/2024    Procedure: COLONOSCOPY with cold snare polypectomy x1 and endoscopic clipping x2;  Surgeon: MALRIN Vanegas MD;  Location: [facility] ENDOSCOPY;  Service: Gastroenterology;  Laterality: N/A;  Post- colon polyps, diverticulosis    ENDOSCOPY N/A 9/14/2023    Procedure: ESOPHAGOGASTRODUODENOSCOPY;  Surgeon: Ulysses Lamas MD;  Location: [facility] ENDOSCOPY;  Service: Gastroenterology;  Laterality: N/A;  gastritis, inflammatory gastric polyp    ENDOSCOPY N/A 7/10/2024    Procedure: ESOPHAGOGASTRODUODENOSCOPY;  Surgeon: MARLIN Vanegas MD;  Location: [facility] ENDOSCOPY;  Service: Gastroenterology;  Laterality: N/A;  Post- hiatal hernia, gastric polyps, gastritis    HYSTERECTOMY      ROTATOR CUFF REPAIR Left        Social History:   Social History     Socioeconomic History    Marital status:    Tobacco Use    Smoking status: Never     Passive exposure: Never    Smokeless tobacco: Never   Vaping Use    Vaping status: Never Used   Substance and Sexual Activity    Alcohol use: No    Drug use: Never    Sexual activity: Defer       Procedures Performed         Consults:   Consults       Date and Time Order Name Status Description    3/10/2025  3:25 AM Cardiology (on-call MD unless  specified) Completed             Condition on Discharge:     Stable    Discharge Disposition  Home or Self Care    Discharge Medications     Discharge Medications        New Medications        Instructions Start Date   atorvastatin 20 MG tablet  Commonly known as: Lipitor   20 mg, Oral, Daily             Continue These Medications        Instructions Start Date   dilTIAZem 30 MG tablet  Commonly known as: CARDIZEM   Take 1 tablet by mouth 2 (Two) Times a Day.      Eliquis 5 MG tablet tablet  Generic drug: apixaban   TAKE 1 TABLET BY MOUTH EVERY 12 HOURS FOR ATRIAL FIBRILLATION      ferrous gluconate 324 MG tablet  Commonly known as: FERGON   324 mg, Oral, Every Other Day      levothyroxine 50 MCG tablet  Commonly known as: SYNTHROID, LEVOTHROID   50 mcg, Oral, Daily      Magnesium 250 MG capsule   1 capsule, Daily      metoprolol succinate  MG 24 hr tablet  Commonly known as: TOPROL-XL   100 mg, Oral, Daily      pantoprazole 40 MG EC tablet  Commonly known as: PROTONIX   40 mg, Oral, Daily      spironolactone 25 MG tablet  Commonly known as: ALDACTONE   25 mg, Daily      Vitamin D (Cholecalciferol) 10 MCG (400 UNIT) tablet  Commonly known as: CHOLECALCIFEROL   400 Units, Daily               Discharge Diet:   Diet Instructions       Diet: Cardiac Diets; Healthy Heart (2-3 Na+); Regular (IDDSI 7); Thin (IDDSI 0)      Discharge Diet: Cardiac Diets    Cardiac Diet: Healthy Heart (2-3 Na+)    Texture: Regular (IDDSI 7)    Fluid Consistency: Thin (IDDSI 0)            Activity at Discharge:     Follow-up Appointments  No future appointments.    Additional Instructions for the Follow-ups that You Need to Schedule       Ambulatory Referral to Home Health   As directed      Face to Face Visit Date: 3/10/2025   Follow-up provider for Plan of Care?: I treated the patient in an acute care facility and will not continue treatment after discharge.   Follow-up provider: BENJAMIN NELSON [[id number]]   Reason/Clinical Findings:  weakness   Describe mobility limitations that make leaving home difficult: weakness   Nursing/Therapeutic Services Requested: Physical Therapy Occupational Therapy   Frequency: 1 Week 1        Discharge Follow-up with PCP   As directed       Currently Documented PCP:    Liv Duron APRN    PCP Phone Number:    [phone]     Follow Up Details: 5-7 days        Discharge Follow-up with Specified Provider: Dr Celis; 2 Weeks   As directed      To: Dr Celis   Follow Up: 2 Weeks                Test Results Pending at Discharge  Pending Results       None             Risk for Readmission (LACE) Score: 8 (3/10/2025  7:30 AM)      Greater than 30 minutes spent in discharge activities for this patient    Signature:Electronically signed by KALYANI Gotti, 03/10/25, 1:48 PM EDT.

## 2025-03-10 NOTE — PLAN OF CARE
Problem: Pain Acute  Goal: Optimal Pain Control and Function  Outcome: Progressing  Intervention: Develop Pain Management Plan  Description: Acknowledge patient as the expert in pain self-management.Use a consistent, validated tool for pain assessment; include function and quality of life.Evaluate risk for opioid use and dependence.Set pain management goals; determine acceptable level of discomfort to allow for maximal functioning.Determine mutually agreed-upon pain management plan, including both pharmacologic and nonpharmacologic measures; integrate management of chronic (persistent) pain.Identify and integrate past successful treatment measures, if able.Reevaluate plan regularly.  Recent Flowsheet Documentation  Taken 3/10/2025 1451 by Anna Pool RN  Pain Management Interventions: pain medication given     Problem: Adult Inpatient Plan of Care  Goal: Plan of Care Review  Outcome: Progressing  Flowsheets (Taken 3/10/2025 1600)  Progress: improving  Plan of Care Reviewed With: patient  Goal: Patient-Specific Goal (Individualized)  Outcome: Progressing  Goal: Absence of Hospital-Acquired Illness or Injury  Outcome: Progressing  Intervention: Identify and Manage Fall Risk  Description: Perform standard risk assessment on admission using a validated tool or comprehensive approach appropriate to the patient; reassess fall risk frequently, with change in status or transfer to another level of care.Communicate risk to interprofessional healthcare team; ensure fall risk visible cue.Determine need for increased observation, equipment and environmental modification, as well as use of supportive, nonskid footwear.Adjust safety measures to individual needs and identified risk factors.Reinforce the importance of active participation with fall risk prevention, safety, and physical activity with the patient and family.Perform regular intentional rounding to assess need for position change, pain assessment and personal  needs, including assistance with toileting.  Recent Flowsheet Documentation  Taken 3/10/2025 1400 by Anna Pool RN  Safety Promotion/Fall Prevention: safety round/check completed  Taken 3/10/2025 1200 by Anna Pool RN  Safety Promotion/Fall Prevention: safety round/check completed  Taken 3/10/2025 1000 by Anna Pool RN  Safety Promotion/Fall Prevention: safety round/check completed  Taken 3/10/2025 0800 by Anna Pool RN  Safety Promotion/Fall Prevention: safety round/check completed  Intervention: Prevent Infection  Description: Maintain skin and mucous membrane integrity; promote hand, oral and pulmonary hygiene.Optimize fluid balance, nutrition, sleep and glycemic control to maximize infection resistance.Identify potential sources of infection early to prevent or mitigate progression of infection (e.g., wound, lines, devices).Evaluate ongoing need for invasive devices; remove promptly when no longer indicated.Review vaccination status.  Recent Flowsheet Documentation  Taken 3/10/2025 1400 by Anna Pool RN  Infection Prevention: single patient room provided  Taken 3/10/2025 1200 by Anna Pool RN  Infection Prevention: single patient room provided  Taken 3/10/2025 1000 by Anna Pool RN  Infection Prevention: single patient room provided  Taken 3/10/2025 0800 by Anna Pool RN  Infection Prevention: single patient room provided  Goal: Optimal Comfort and Wellbeing  Outcome: Progressing  Intervention: Monitor Pain and Promote Comfort  Description: Assess pain level, treatment efficacy and patient response at regular intervals using a consistent pain scale.Consider the presence and impact of preexisting chronic pain.Encourage patient and caregiver involvement in pain assessment, interventions and safety measures.Promote activity; balance with sleep and rest to enhance healing.  Recent Flowsheet Documentation  Taken 3/10/2025 1451 by Anna Pool RN  Pain  Management Interventions: pain medication given  Goal: Readiness for Transition of Care  Outcome: Progressing  Intervention: Mutually Develop Transition Plan  Description: Identify available resources for support (e.g., family, friends, community).Identify and address barriers to ongoing treatment and home management (e.g., environmental, financial).Provide opportunities to practice self-management skills.Assess and monitor emotional readiness for transition.Establish or reconnect linkage with outpatient providers or community-based services.  Recent Flowsheet Documentation  Taken 3/10/2025 0739 by Anna Pool RN  Transportation Anticipated: family or friend will provide  Patient/Family Anticipated Services at Transition:   Patient/Family Anticipates Transition to: home with help/services  Taken 3/10/2025 0734 by Anna Pool RN  Equipment Currently Used at Home:   rollator   cane, straight     Problem: Fall Injury Risk  Goal: Absence of Fall and Fall-Related Injury  Outcome: Progressing  Intervention: Promote Injury-Free Environment  Description: Provide a safe, barrier-free environment that encourages independent activity.Keep care area uncluttered and well-lighted.Determine need for increased observation or monitoring.Avoid use of devices that minimize mobility, such as restraints or indwelling urinary catheter.  Recent Flowsheet Documentation  Taken 3/10/2025 1400 by Anna Pool RN  Safety Promotion/Fall Prevention: safety round/check completed  Taken 3/10/2025 1200 by Anna Pool RN  Safety Promotion/Fall Prevention: safety round/check completed  Taken 3/10/2025 1000 by Anna Pool RN  Safety Promotion/Fall Prevention: safety round/check completed  Taken 3/10/2025 0800 by Anna Pool RN  Safety Promotion/Fall Prevention: safety round/check completed     Problem: Comorbidity Management  Goal: Blood Pressure in Desired Range  Outcome: Progressing   Goal Outcome  Evaluation:  Plan of Care Reviewed With: patient        Progress: improving

## 2025-03-10 NOTE — THERAPY EVALUATION
Patient Name: Catalina Moreno  : 1940    MRN: [id number]                              Today's Date: 3/10/2025       Admit Date: 3/10/2025    Visit Dx:     ICD-10-CM ICD-9-CM   1. Atrial fibrillation with RVR  I48.91 427.31   2. Chest pain, unspecified type  R07.9 786.50     Patient Active Problem List   Diagnosis    Arthritis    Depressive disorder    Primary fibromyalgia syndrome    Hypothyroidism    Atrial fibrillation with RVR    Stage 3a chronic kidney disease    Chronic low back pain    Age-related cognitive decline    Generalized anxiety disorder    Polyneuropathy, unspecified    Restless legs syndrome    Essential (primary) hypertension    Gastro-esophageal reflux disease without esophagitis    Pulmonary hypertension, unspecified    Coronary artery disease involving native coronary artery of native heart without angina pectoris    Presence of cardiac pacemaker    Chronic HFrEF (heart failure with reduced ejection fraction)    Atrial flutter with rapid ventricular response    Weakness    AF (atrial fibrillation)    Chronic heart failure with preserved ejection fraction (HFpEF)    Fall    Nasal bones, closed fracture    Iron deficiency anemia    Atrial fibrillation    Chest pain     Past Medical History:   Diagnosis Date    Acquired spondylolisthesis of lumbosacral region     Acute kidney injury 2022    Anxiety     Arthritis     Atrial fibrillation     paroxysmal    Broken shoulder     left-- due to fall 20 was at [facility]    Chronic pain disorder     Chronic systolic CHF (congestive heart failure) 2023    EF 36-40%    Coronary artery disease involving native coronary artery of native heart without angina pectoris 2021    nonobstructive    DDD (degenerative disc disease), cervical     DDD (degenerative disc disease), lumbar     Depression     Edema     2020 foot    GERD (gastroesophageal reflux disease)     H/O fall     Heart failure with mid-range ejection fraction 2022     EF 45% per 2D echo    Hypertension     Hypothyroidism     Insomnia     Low back pain     Moderate mitral regurgitation 01/05/2023    Neuropathy     Nonrheumatic tricuspid valve regurgitation     Pain in both feet     Polypharmacy     PONV (postoperative nausea and vomiting)     Pulmonary hypertension     Radiculopathy     Restless legs     Sacroiliitis     Sick sinus syndrome     Added automatically from request for surgery [id number]    Spondylolisthesis     Stage 3a chronic kidney disease     Urinary incontinence     Vitamin D deficiency      Past Surgical History:   Procedure Laterality Date    BACK SURGERY  07/19/2018    PDC &  PSF L3-L5 insitu    CARDIAC CATHETERIZATION N/A 4/2/2021    Procedure: Cardiac Catheterization/Vascular Study;  Surgeon: Barrett Evans MD;  Location: [facility] CATH INVASIVE LOCATION;  Service: Cardiovascular;  Laterality: N/A;    CARDIAC ELECTROPHYSIOLOGY PROCEDURE N/A 1/17/2023    Procedure: Pacemaker DC new;  Surgeon: Baljeet Valero MD;  Location: [facility] CATH INVASIVE LOCATION;  Service: Cardiovascular;  Laterality: N/A;    CHOLECYSTECTOMY      COLONOSCOPY      COLONOSCOPY N/A 7/10/2024    Procedure: COLONOSCOPY with cold snare polypectomy x1 and endoscopic clipping x2;  Surgeon: MARLIN Vanegas MD;  Location: [facility] ENDOSCOPY;  Service: Gastroenterology;  Laterality: N/A;  Post- colon polyps, diverticulosis    ENDOSCOPY N/A 9/14/2023    Procedure: ESOPHAGOGASTRODUODENOSCOPY;  Surgeon: Ulysses Lamas MD;  Location: [facility] ENDOSCOPY;  Service: Gastroenterology;  Laterality: N/A;  gastritis, inflammatory gastric polyp    ENDOSCOPY N/A 7/10/2024    Procedure: ESOPHAGOGASTRODUODENOSCOPY;  Surgeon: MARLIN Vanegas MD;  Location: [facility] ENDOSCOPY;  Service: Gastroenterology;  Laterality: N/A;  Post- hiatal hernia, gastric polyps, gastritis    HYSTERECTOMY      ROTATOR CUFF REPAIR Left       General Information       Row Name 03/10/25 1017          OT Time and  Intention    Subjective Information no complaints  -SP     Document Type evaluation  -SP     Mode of Treatment individual therapy;occupational therapy  -SP     Patient Effort excellent  -SP     Symptoms Noted During/After Treatment dizziness  -SP       Row Name 03/10/25 1017          General Information    Patient Profile Reviewed yes  -SP     Prior Level of Function independent:;ADL's;driving  Weekly assist for medication management from neighbor, cane and rollator to ambulate  -SP     Existing Precautions/Restrictions fall  -SP     Barriers to Rehab medically complex  -SP       Row Name 03/10/25 1017          Living Environment    Current Living Arrangements home  -SP     People in Home alone  -SP       Row Name 03/10/25 1017          Home Main Entrance    Number of Stairs, Main Entrance none  -SP       Row Name 03/10/25 1017          Stairs Within Home, Primary    Number of Stairs, Within Home, Primary none  -SP       Row Name 03/10/25 1017          Cognition    Orientation Status (Cognition) oriented x 3;verbal cues/prompts needed for orientation;disoriented to;time  -SP       Row Name 03/10/25 1017          Safety Issues/Impairments Affecting Functional Mobility    Impairments Affecting Function (Mobility) balance;cognition;endurance/activity tolerance;strength  -SP               User Key  (r) = Recorded By, (t) = Taken By, (c) = Cosigned By      Initials Name Provider Type    SP Choco Michele OT Occupational Therapist                     Mobility/ADL's       Row Name 03/10/25 1018          Bed Mobility    Bed Mobility bed mobility (all) activities  -SP     All Activities, [facility] (Bed Mobility) independent  -SP       Row Name 03/10/25 1018          Transfers    Transfers sit-stand transfer;stand-sit transfer  -SP       Row Name 03/10/25 1018          Sit-Stand Transfer    Sit-Stand [facility] (Transfers) contact guard;other (see comments)  HHA  -SP       Row Name 03/10/25 1018          Stand-Sit  Transfer    Stand-Sit [place] (Transfers) contact guard;other (see comments)  HHA  -SP       Row Name 03/10/25 1018          Functional Mobility    Functional Mobility- Ind. Level contact guard assist;other (see comments)  HH  -SP               User Key  (r) = Recorded By, (t) = Taken By, (c) = Cosigned By      Initials Name Provider Type    SP Choco Michele OT Occupational Therapist                   Obj/Interventions       Row Name 03/10/25 1019          Sensory Assessment (Somatosensory)    Sensory Assessment (Somatosensory) UE sensation intact  -SP     Sensory Assessment Reports LLE numbness  -SP       Row Name 03/10/25 1019          Range of Motion Comprehensive    General Range of Motion bilateral upper extremity ROM WFL  -SP       Row Name 03/10/25 1019          Strength Comprehensive (MMT)    Comment, General Manual Muscle Testing (MMT) Assessment BUE grossly 4-/5  -SP       Row Name 03/10/25 1019          Motor Skills    Motor Skills functional endurance  -SP     Functional Endurance decreased endurance, fair-  -SP       Row Name 03/10/25 1019          Balance    Balance Assessment sitting dynamic balance;sit to stand dynamic balance;standing dynamic balance  -SP     Dynamic Sitting Balance standby assist  -SP     Position, Sitting Balance unsupported;sitting edge of bed  -SP     Sit to Stand Dynamic Balance contact guard  -SP     Dynamic Standing Balance contact guard;other (see comments)  HHA  -SP               User Key  (r) = Recorded By, (t) = Taken By, (c) = Cosigned By      Initials Name Provider Type    SP Choco Michele OT Occupational Therapist                   Goals/Plan       Row Name 03/10/25 1022          Bathing Goal 1 (OT)    Activity/Device (Bathing Goal 1, OT) bathing skills, all  -SP     [place] Level/Cues Needed (Bathing Goal 1, OT) minimum assist (75% or more patient effort)  -SP     Time Frame (Bathing Goal 1, OT) 2 weeks  -SP     Strategies/Barriers (Bathing Goal 1,  OT) until d/c  -SP       Row Name 03/10/25 1022          Dressing Goal 1 (OT)    Activity/Device (Dressing Goal 1, OT) dressing skills, all  -SP     [place]/Cues Needed (Dressing Goal 1, OT) minimum assist (75% or more patient effort)  -SP     Time Frame (Dressing Goal 1, OT) 2 weeks  -SP     Strategies/Barriers (Dressing Goal 1, OT) until d/c  -SP       Row Name 03/10/25 1022          Toileting Goal 1 (OT)    Activity/Device (Toileting Goal 1, OT) toileting skills, all  -SP     [place] Level/Cues Needed (Toileting Goal 1, OT) minimum assist (75% or more patient effort)  -SP     Time Frame (Toileting Goal 1, OT) 2 weeks  -SP     Strategies/Barriers (Toileting Goal 1, OT) util d/c  -SP       Row Name 03/10/25 1022          Therapy Assessment/Plan (OT)    Planned Therapy Interventions (OT) activity tolerance training;BADL retraining;functional balance retraining;IADL retraining;patient/caregiver education/training;occupation/activity based interventions;ROM/therapeutic exercise;strengthening exercise;transfer/mobility retraining  -SP               User Key  (r) = Recorded By, (t) = Taken By, (c) = Cosigned By      Initials Name Provider Type    Choco Orellana OT Occupational Therapist                   Clinical Impression       Row Name 03/10/25 1020          Pain Assessment    Additional Documentation Pain Scale: FACES Pre/Post-Treatment (Group)  -SP       Row Name 03/10/25 1020          Pain Scale: FACES Pre/Post-Treatment    Pain: FACES Scale, Pretreatment 0-->no hurt  -SP     Posttreatment Pain Rating 2-->hurts little bit  -SP     Pre/Posttreatment Pain Comment reports her head feels fuzzy  -SP       Row Name 03/10/25 1020          Plan of Care Review    Plan of Care Reviewed With patient  -SP     Outcome Evaluation Pt is a 85 y/o female admitted to [facility] on 3/10/25 with c/o chest pain with associated nausea and SOA. XR chest: Cardiomegaly. No active disease. EKG: Atrial fibrillation flutter with  ventricular paced complexes, pt was given dose of Cardizem. PMHx significant for CKDIII, HTN, pacemaker in place, depressive disorder, fibromyalgia, CHF, hx falls, DDD, and polyneuropathy. At baseline, pt resides alone in a H with x0 VLADIMIR. She is actively driving and IND with ADLs, however reports decreased endurance and has now been sponge bathing due to difficulty transferring from tub-shower. Pt with limited family support, states her neighbor comes over to assist with medication management. Upon assessment, pt A&O to name/, place, year, and situation. Disoriented to current month. SBT administered, pt scored 14/28 indicating impaired cognition. Pt does state she has been having short-term memory deficits within the past few months and this has been frustrating for her, however aware. Pt completes bed mobility IND and sat EOB with SBA. Pt exhibits global weakness, BUE grossly 4-/5 and noted with decreased endurance. BP hypertensive, 164/99 and A-fib; ranging from . Pt comes to standing with CGA/HHA and able to take x4 side steps towards HOB. Pt appears slightly off balance, requiring increased time to get bearings. Based on assessment, pt would benefit from continued skilled OT services while admitted and OT recommending home health services when medically appropriate for d/c.  -SP       Row Name 03/10/25 1020          Therapy Assessment/Plan (OT)    Criteria for Skilled Therapeutic Interventions Met (OT) yes;meets criteria;skilled treatment is necessary  -SP     Therapy Frequency (OT) 5 times/wk  -SP     Predicted Duration of Therapy Intervention (OT) until d/c  -SP       Row Name 03/10/25 1020          Therapy Plan Review/Discharge Plan (OT)    Anticipated Discharge Disposition (OT) home with home health  -SP       Row Name 03/10/25 1020          Vital Signs    Pre Systolic BP Rehab 164  -SP     Pre Treatment Diastolic BP 99  -SP     O2 Delivery Pre Treatment room air  -SP     O2 Delivery Intra  Treatment room air  -SP     Post SpO2 (%) 94  -SP     O2 Delivery Post Treatment room air  -SP     Pre Patient Position Supine  -SP     Intra Patient Position Standing  -SP     Post Patient Position Supine  -SP       Row Name 03/10/25 1020          Positioning and Restraints    Pre-Treatment Position in bed  -SP     Post Treatment Position bed  -SP     In Bed fowlers;call light within reach;encouraged to call for assist;exit alarm on;with nsg  -SP               User Key  (r) = Recorded By, (t) = Taken By, (c) = Cosigned By      Initials Name Provider Type    Choco Orellana OT Occupational Therapist                   Outcome Measures       Row Name 03/10/25 1022          How much help from another is currently needed...    Putting on and taking off regular lower body clothing? 3  -SP     Bathing (including washing, rinsing, and drying) 3  -SP     Toileting (which includes using toilet bed pan or urinal) 3  -SP     Putting on and taking off regular upper body clothing 4  -SP     Taking care of personal grooming (such as brushing teeth) 4  -SP     Eating meals 4  -SP     AM-PAC 6 Clicks Score (OT) 21  -SP       Row Name 03/10/25 0728          How much help from another person do you currently need...    Turning from your back to your side while in flat bed without using bedrails? 4  -NC     Moving from lying on back to sitting on the side of a flat bed without bedrails? 4  -NC     Moving to and from a bed to a chair (including a wheelchair)? 4  -NC     Standing up from a chair using your arms (e.g., wheelchair, bedside chair)? 4  -NC     Climbing 3-5 steps with a railing? 4  -NC     To walk in hospital room? 3  -NC     AM-PAC 6 Clicks Score (PT) 23  -NC       Row Name 03/10/25 1022          Functional Assessment    Outcome Measure Options AM-PAC 6 Clicks Daily Activity (OT)  -SP               User Key  (r) = Recorded By, (t) = Taken By, (c) = Cosigned By      Initials Name Provider Type    Choco Orellana OT  Occupational Therapist    Anna Gotti RN Registered Nurse                    Occupational Therapy Education       Title: PT OT SLP Therapies (In Progress)       Topic: Occupational Therapy (Done)       Point: ADL training (Done)       Description:   Instruct learner(s) on proper safety adaptation and remediation techniques during self care or transfers.   Instruct in proper use of assistive devices.                  Learning Progress Summary            Patient Acceptance, E,TB, VU by SP at 3/10/2025 1023                      Point: Home exercise program (Done)       Description:   Instruct learner(s) on appropriate technique for monitoring, assisting and/or progressing therapeutic exercises/activities.                  Learning Progress Summary            Patient Acceptance, E,TB, VU by SP at 3/10/2025 1023                      Point: Precautions (Done)       Description:   Instruct learner(s) on prescribed precautions during self-care and functional transfers.                  Learning Progress Summary            Patient Acceptance, E,TB, VU by SP at 3/10/2025 1023                      Point: Body mechanics (Done)       Description:   Instruct learner(s) on proper positioning and spine alignment during self-care, functional mobility activities and/or exercises.                  Learning Progress Summary            Patient Acceptance, E,TB, VU by SP at 3/10/2025 1023                                      User Key       Initials Effective Dates Name Provider Type Discipline    KELLI 11/15/23 -  Choco Michele, OT Occupational Therapist OT                  OT Recommendation and Plan  Planned Therapy Interventions (OT): activity tolerance training, BADL retraining, functional balance retraining, IADL retraining, patient/caregiver education/training, occupation/activity based interventions, ROM/therapeutic exercise, strengthening exercise, transfer/mobility retraining  Therapy Frequency (OT): 5 times/wk  Plan of  Care Review  Plan of Care Reviewed With: patient  Outcome Evaluation: Pt is a 85 [facility] c/o chest pain with associated nausea and SOA. XR chest: Cardiomegaly. No active disease. EKG: Atrial fibrillation flutter with ventricular paced complexes, pt was given dose of Cardizem. PMHx significant for CKDIII, HTN, pacemaker in place, depressive disorder, fibromyalgia, CHF, hx falls, DDD, and polyneuropathy. At baseline, pt resides alone in a H with x0 VLADIMIR. She is actively driving and IND with ADLs, however reports decreased endurance and has now been sponge bathing due to difficulty transferring from tub-shower. Pt with limited family support, states her neighbor comes over to assist with medication management. Upon assessment, pt A&O to name/, place, year, and situation. Disoriented to current month. SBT administered, pt scored 14/28 indicating impaired cognition. Pt does state she has been having short-term memory deficits within the past few months and this has been frustrating for her, however aware. Pt completes bed mobility IND and sat EOB with SBA. Pt exhibits global weakness, BUE grossly 4-/5 and noted with decreased endurance. BP hypertensive, 164/99 and A-fib; ranging from . Pt comes to standing with CGA/HHA and able to take x4 side steps towards HOB. Pt appears slightly off balance, requiring increased time to get bearings. Based on assessment, pt would benefit from continued skilled OT services while admitted and OT recommending home health services when medically appropriate for d/c.     Time Calculation:         Time Calculation- OT       Row Name 03/10/25 1023             Time Calculation- OT    OT Start Time 939  -SP      OT Stop Time 958  -SP      OT Time Calculation (min) 19 min  -SP      OT Received On 03/10/25  -SP      OT - Next Appointment 25  -SP      OT Goal Re-Cert Due Date 25  -SP                User Key  (r) = Recorded By, (t) = Taken  By, (c) = Cosigned By      Initials Name Provider Type    SP Choco Michele, OT Occupational Therapist                  Therapy Charges for Today       Code Description Service Date Service Provider Modifiers Qty    [id number] HC OT EVAL MOD COMPLEXITY 4 3/10/2025 Choco Michele OT GO 1                 Choco Michele OT  3/10/2025

## 2025-03-10 NOTE — OUTREACH NOTE
Prep Survey      Flowsheet Row Responses   [facility] facility patient discharged from? [facility]   Is LACE score < 7 ? No   Eligibility [facility]   Date of Admission 03/10/25   Date of Discharge 03/10/25   Discharge Disposition Home-Health Care Svc   Discharge diagnosis chest pain   Does the patient have one of the following disease processes/diagnoses(primary or secondary)? Other   Does the patient have Home health ordered? Yes   What is the Home health agency?  [facility] HH pending   Is there a DME ordered? No   Prep survey completed? Yes            Marina SCHWAB - Registered Nurse

## 2025-03-10 NOTE — ED PROVIDER NOTES
Subjective   History of Present Illness  84-year-old female presents with chest pain.  She describes it as a pressure off-and-on today.  She has had some nausea shortness of breath.  She has had no diaphoresis.  No complaints of leg pain or edema today.  She has had no fever.  Review of Systems    Past Medical History:   Diagnosis Date    Acquired spondylolisthesis of lumbosacral region     Acute kidney injury 07/22/2022    Anxiety     Arthritis     Atrial fibrillation     paroxysmal    Broken shoulder     left-- due to fall 11-7-19 was at [facility]    Chronic pain disorder     Chronic systolic CHF (congestive heart failure) 01/05/2023    EF 36-40%    Coronary artery disease involving native coronary artery of native heart without angina pectoris 04/02/2021    nonobstructive    DDD (degenerative disc disease), cervical     DDD (degenerative disc disease), lumbar     Depression     Edema     9/2020 foot    GERD (gastroesophageal reflux disease)     H/O fall     Heart failure with mid-range ejection fraction 03/05/2022    EF 45% per 2D echo    Hypertension     Hypothyroidism     Insomnia     Low back pain     Moderate mitral regurgitation 01/05/2023    Neuropathy     Nonrheumatic tricuspid valve regurgitation     Pain in both feet     Polypharmacy     PONV (postoperative nausea and vomiting)     Pulmonary hypertension     Radiculopathy     Restless legs     Sacroiliitis     Sick sinus syndrome     Added automatically from request for surgery [id number]    Spondylolisthesis     Stage 3a chronic kidney disease     Urinary incontinence     Vitamin D deficiency        Allergies   Allergen Reactions    Baclofen Rash    Codeine Nausea Only    Ibuprofen Unknown (See Comments)     Patient doesn't know----Motin    Methocarbamol Unknown (See Comments)     Patient doesn't know    Naproxen Unknown (See Comments)     Pt. Doesn't know     Tizanidine Hcl Other (See Comments)     Syncope     Tolmetin Dizziness     Pt. Doesn't know-- same as  Tolectin       Past Surgical History:   Procedure Laterality Date    BACK SURGERY  07/19/2018    PDC &  PSF L3-L5 insitu    CARDIAC CATHETERIZATION N/A 4/2/2021    Procedure: Cardiac Catheterization/Vascular Study;  Surgeon: Barrett Evans MD;  Location: [facility] CATH INVASIVE LOCATION;  Service: Cardiovascular;  Laterality: N/A;    CARDIAC ELECTROPHYSIOLOGY PROCEDURE N/A 1/17/2023    Procedure: Pacemaker DC new;  Surgeon: Baljeet Valero MD;  Location: [facility] CATH INVASIVE LOCATION;  Service: Cardiovascular;  Laterality: N/A;    CHOLECYSTECTOMY      COLONOSCOPY      COLONOSCOPY N/A 7/10/2024    Procedure: COLONOSCOPY with cold snare polypectomy x1 and endoscopic clipping x2;  Surgeon: MARLIN Vanegas MD;  Location: [facility] ENDOSCOPY;  Service: Gastroenterology;  Laterality: N/A;  Post- colon polyps, diverticulosis    ENDOSCOPY N/A 9/14/2023    Procedure: ESOPHAGOGASTRODUODENOSCOPY;  Surgeon: Ulysses Lamas MD;  Location: [facility] ENDOSCOPY;  Service: Gastroenterology;  Laterality: N/A;  gastritis, inflammatory gastric polyp    ENDOSCOPY N/A 7/10/2024    Procedure: ESOPHAGOGASTRODUODENOSCOPY;  Surgeon: MARLIN Vanegas MD;  Location: [facility] ENDOSCOPY;  Service: Gastroenterology;  Laterality: N/A;  Post- hiatal hernia, gastric polyps, gastritis    HYSTERECTOMY      ROTATOR CUFF REPAIR Left        Family History   Problem Relation Age of Onset    Cancer Father     Diabetes Son     Cancer Paternal Aunt     Heart disease Paternal Aunt     Cancer Paternal Uncle        Social History     Socioeconomic History    Marital status:    Tobacco Use    Smoking status: Never     Passive exposure: Never    Smokeless tobacco: Never   Vaping Use    Vaping status: Never Used   Substance and Sexual Activity    Alcohol use: No    Drug use: Never    Sexual activity: Defer     Prior to Admission medications    Medication Sig Start Date End Date Taking? Authorizing Provider   apixaban (Eliquis) 5 MG tablet  tablet TAKE 1 TABLET BY MOUTH EVERY 12 HOURS FOR ATRIAL FIBRILLATION 2/18/25   Liv Duron APRN   dilTIAZem (CARDIZEM) 30 MG tablet Take 1 tablet by mouth 2 (Two) Times a Day. 10/28/24   lEton Flores MD   ferrous gluconate (FERGON) 324 MG tablet Take 1 tablet by mouth Every Other Day. 1/14/25   Liv Duron APRN   levothyroxine (SYNTHROID, LEVOTHROID) 50 MCG tablet Take 1 tablet by mouth Daily. 1/14/25   Liv Duron APRN   Magnesium 250 MG capsule Take 1 capsule by mouth Daily.    Elton Flores MD   metoprolol succinate XL (TOPROL-XL) 100 MG 24 hr tablet Take 1 tablet by mouth Daily. 1/29/25   Baljeet Valero MD   pantoprazole (PROTONIX) 40 MG EC tablet TAKE 1 TABLET BY MOUTH EVERY DAY 2/14/25   Liv Duron APRN   spironolactone (ALDACTONE) 25 MG tablet Take 1 tablet by mouth Daily.    Elton Flores MD   Vitamin D, Cholecalciferol, (CHOLECALCIFEROL) 10 MCG (400 UNIT) tablet Take 1 tablet by mouth Daily.    ProviderElton MD           Objective   Physical Exam  84-year-old female awake alert.  Generally somewhat chronically on appearance.  Chest clear equal breath sounds cardiovascular irregular regular rhythm.  Abdomen soft nontender legs without tenderness edema  Procedures           ED Course      Results for orders placed or performed during the hospital encounter of 03/10/25   ECG 12 Lead Chest Pain    Collection Time: 03/10/25 12:13 AM   Result Value Ref Range    QT Interval 393 ms    QTC Interval 478 ms   Comprehensive Metabolic Panel    Collection Time: 03/10/25  1:24 AM    Specimen: Arm, Left; Blood   Result Value Ref Range    Glucose 106 (H) 65 - 99 mg/dL    BUN 29 (H) 8 - 23 mg/dL    Creatinine 0.89 0.57 - 1.00 mg/dL    Sodium 135 (L) 136 - 145 mmol/L    Potassium 4.4 3.5 - 5.2 mmol/L    Chloride 98 98 - 107 mmol/L    CO2 25.6 22.0 - 29.0 mmol/L    Calcium 9.6 8.6 - 10.5 mg/dL    Total Protein 7.4 6.0 - 8.5 g/dL    Albumin 4.2 3.5 - 5.2 g/dL    ALT (SGPT)  7 1 - 33 U/L    AST (SGOT) 20 1 - 32 U/L    Alkaline Phosphatase 84 39 - 117 U/L    Total Bilirubin 0.7 0.0 - 1.2 mg/dL    Globulin 3.2 gm/dL    A/G Ratio 1.3 g/dL    BUN/Creatinine Ratio 32.6 (H) 7.0 - 25.0    Anion Gap 11.4 5.0 - 15.0 mmol/L    eGFR 64.4 >60.0 mL/min/1.73   High Sensitivity Troponin T    Collection Time: 03/10/25  1:24 AM    Specimen: Arm, Left; Blood   Result Value Ref Range    HS Troponin T 9 <14 ng/L   BNP    Collection Time: 03/10/25  1:24 AM    Specimen: Arm, Left; Blood   Result Value Ref Range    proBNP 1,815.0 (H) 0.0 - 1,800.0 pg/mL   CBC Auto Differential    Collection Time: 03/10/25  1:24 AM    Specimen: Arm, Left; Blood   Result Value Ref Range    WBC 7.98 3.40 - 10.80 10*3/mm3    RBC 4.08 3.77 - 5.28 10*6/mm3    Hemoglobin 12.6 12.0 - 15.9 g/dL    Hematocrit 38.9 34.0 - 46.6 %    MCV 95.3 79.0 - 97.0 fL    MCH 30.9 26.6 - 33.0 pg    MCHC 32.4 31.5 - 35.7 g/dL    RDW 13.9 12.3 - 15.4 %    RDW-SD 48.7 37.0 - 54.0 fl    MPV 9.6 6.0 - 12.0 fL    Platelets 220 140 - 450 10*3/mm3    Neutrophil % 67.1 42.7 - 76.0 %    Lymphocyte % 22.1 19.6 - 45.3 %    Monocyte % 7.5 5.0 - 12.0 %    Eosinophil % 2.0 0.3 - 6.2 %    Basophil % 0.9 0.0 - 1.5 %    Immature Grans % 0.4 0.0 - 0.5 %    Neutrophils, Absolute 5.36 1.70 - 7.00 10*3/mm3    Lymphocytes, Absolute 1.76 0.70 - 3.10 10*3/mm3    Monocytes, Absolute 0.60 0.10 - 0.90 10*3/mm3    Eosinophils, Absolute 0.16 0.00 - 0.40 10*3/mm3    Basophils, Absolute 0.07 0.00 - 0.20 10*3/mm3    Immature Grans, Absolute 0.03 0.00 - 0.05 10*3/mm3    nRBC 0.0 0.0 - 0.2 /100 WBC   Green Top (Gel)    Collection Time: 03/10/25  1:24 AM   Result Value Ref Range    Extra Tube Hold for add-ons.    Lavender Top    Collection Time: 03/10/25  1:24 AM   Result Value Ref Range    Extra Tube hold for add-on    Gold Top - SST    Collection Time: 03/10/25  1:24 AM   Result Value Ref Range    Extra Tube Hold for add-ons.    Light Blue Top    Collection Time: 03/10/25  1:24 AM  "  Result Value Ref Range    Extra Tube Hold for add-ons.      XR Chest 1 View   Final Result   Impression:   Cardiomegaly. No active disease.               Electronically Signed: Quan Miguel MD     3/10/2025 2:13 AM EDT     Workstation ID: [id number]        Medications   sodium chloride 0.9 % flush 10 mL (has no administration in time range)   dilTIAZem (CARDIZEM) injection 10 mg (10 mg Intravenous Given 3/10/25 0150)     /68   Pulse 77   Temp 99.1 °F (37.3 °C) (Oral)   Resp 16   Ht 152.4 cm (60\")   Wt 72.6 kg (160 lb)   SpO2 95%   BMI 31.25 kg/m²                                                    Medical Decision Making  Amount and/or Complexity of Data Reviewed  Labs: ordered.  Radiology: ordered.  ECG/medicine tests: ordered.    Risk  Prescription drug management.    Chart review: Patient had transesophageal echocardiogram January of this year at that time patient was not a good candidate for rhythm control and ablation due to severe left atrial enlargement.  Recommended that time was to increase beta-blocker dosage.  Comorbidity: As per past history   Differential: Angina, atrial fibrillation, MI,  My EKG interpretation: Atrial fibrillation flutter with ventricular paced complexes.  Compared to previous no significant change noted  Lab: Initial troponin 9 BNP 1815 CBC normal comprehensive metabolic panel sodium 135 BUN 29  My Radiology review and interpretation: Chest x-ray reveals cardiomegaly.  Pulmonary vasculature appears normal.  No effusion or pneumothorax.  No pneumonia  Discussion/treatment: Patient IV placed.  Was given dose of Cardizem for her atrial fibrillation.  She had improvement in her rate with this.  Patient's findings were discussed with her.  After discussion we will bring her in for observation with cardiology evaluation in a.m.  It does not appear she is a candidate for more aggressive treatment unless she would develop significant troponin increase.  She was given Norco for her " back pain which is not new.  Patient was evaluated using appropriate PPE      Final diagnoses:   Atrial fibrillation with RVR   Chest pain, unspecified type       ED Disposition  ED Disposition       ED Disposition   Intended Admit    Condition   --    Comment   --               No follow-up provider specified.       Medication List      No changes were made to your prescriptions during this visit.            Tommy Greco MD  03/10/25 0321

## 2025-03-11 ENCOUNTER — TRANSITIONAL CARE MANAGEMENT TELEPHONE ENCOUNTER (OUTPATIENT)
Dept: CALL CENTER | Facility: HOSPITAL | Age: 85
End: 2025-03-11
Payer: MEDICARE

## 2025-03-11 ENCOUNTER — DOCUMENTATION (OUTPATIENT)
Dept: HOME HEALTH SERVICES | Facility: HOME HEALTHCARE | Age: 85
End: 2025-03-11
Payer: MEDICARE

## 2025-03-11 LAB
QT INTERVAL: 393 MS
QTC INTERVAL: 478 MS

## 2025-03-11 NOTE — OUTREACH NOTE
Call Center TCM Note      Flowsheet Row Responses   [facility] facility patient discharged from? [facility]   Does the patient have one of the following disease processes/diagnoses(primary or secondary)? Other   TCM attempt successful? No   Unsuccessful attempts Attempt 1            Earline Briones MA    3/11/2025, 09:24 EDT

## 2025-03-11 NOTE — PROGRESS NOTES
Start PACC Note    Home Health Referral    Evaluated patient on Home Care and services available. Patient offered choice of available HHC and agreeable to RN/PT services with [facility].    Care Types:   Isolation Precautions: [unfilled]    Social Determinants of Health:  Tobacco Use: Low Risk  (3/10/2025)    Patient History     Smoking Tobacco Use: Never     Smokeless Tobacco Use: Never     Passive Exposure: Never     Social History     Substance and Sexual Activity   Alcohol Use No     Social History     Substance and Sexual Activity   Drug Use Never       Does the patient have any financial resource strain?   Does the patient have any food insecurities?   Does the patient have any housing instabilities?     If any of the above is noted as yes - consider a MSW evaluation once the patient returns home.    START PATIENT REGISTRATION INFORMATION  Order Information  Order Signing Physician: Liv Duron NP  Service Ordered RN?: Yes  Service Ordered PT?: Yes  Service Ordered OT?: No  Service Ordered ST?: No  Service Ordered MSW?: No  Service Ordered HHA?: No  Following Physician: Liv Duron APRN  Following Physician Phone: [phone]  Overseeing Physician: Liv Duron APRN  (Required for Residents Only)  Agreeable to Follow? Yes  Date/Time of Call 03/11/25 09:19 EDT, Spoke with: via secure chat    Care Coordination  Same Day SOC?: No  Primary Care Physician: Liv Duron APRN  Primary Care Physician Phone: [phone]  Primary Care Physician Address: [address] [place] [address] [place] [facility]  Visit Instructions: N/A  Service Discharge Location Type: Home  Service Facility Name: N/A  Service Floor Facility: N/A  Service Room No: N/A    Demographics  Patient Last Name: Josh  Patient First Name: Catalina  Language/Communication Barrier: none  Service Address: [address] [place] [address]  Service City: [place]  Service State: [place]  Service Zip: [address]  Service Home Phone: [phone]  Other Phone Numbers:    Telephone Information:   Mobile [phone]     Emergency Contact:   Extended Emergency Contact Information  Primary Emergency Contact: Shwetha Eldridge  Home Phone: [phone]  Relation: Neighbor  Secondary Emergency Contact: MADHAV DEAN   [facility]  Home Phone: [phone]  Mobile Phone: [phone]  Relation: Son  Hearing or visual needs: None  Other needs: None  Preferred language: English   needed? No    Admission Information  Admit Date: 03/10/2025  Patient Status at Discharge:   Admitting Diagnosis:chest pain, afib with RVR    Caregiver Information  Caregiver First Name:   Caregiver Last Name:   Caregiver Relationship to Patient:   Caregiver Phone Number:  Caregiver Notes:     [employer]  No      END PATIENT REGISTRATION INFORMATION    Start PACC Summary    Additional Comments:     END PACC Summary    Discharge Date: Pending    Referral Source: KADEEM Rios    Signed By: Maia Becerril RN, 3/11/2025, 09:19 EDT     Date/Time: 03/11/25 09:19 EDT    End PACC Note

## 2025-03-11 NOTE — CASE MANAGEMENT/SOCIAL WORK
Case Management Discharge Note      Final Note: Home with [facility]              Home Medical Care Coordination complete.      Service Provider Services Address Phone Fax Patient Preferred    [facility] [address] [facility] [place] [address] [phone] --                      Transportation Services  Private: Car    Final Discharge Disposition Code: 06 - home with home health care

## 2025-03-11 NOTE — OUTREACH NOTE
Call Center TCM Note      Flowsheet Row Responses   [facility] facility patient discharged from? [facility]   Does the patient have one of the following disease processes/diagnoses(primary or secondary)? Other   TCM attempt successful? No   Unsuccessful attempts Attempt 2            Earline Briones MA    3/11/2025, 15:23 EDT

## 2025-03-12 ENCOUNTER — TRANSITIONAL CARE MANAGEMENT TELEPHONE ENCOUNTER (OUTPATIENT)
Dept: CALL CENTER | Facility: HOSPITAL | Age: 85
End: 2025-03-12
Payer: MEDICARE

## 2025-03-12 NOTE — OUTREACH NOTE
Call Center TCM Note      Flowsheet Row Responses   [facility] patient discharged from? [facility]   Does the patient have one of the following disease processes/diagnoses(primary or secondary)? Other   TCM attempt successful? Yes   Call start time 0916   Call end time 0922   Discharge diagnosis chest pain   Meds reviewed with patient/caregiver? Yes   Is the patient having any side effects they believe may be caused by any medication additions or changes? No   Does the patient have all medications ordered at discharge? Yes   Is the patient taking all medications as directed (includes completed medication regime)? Yes   Comments Pt states she will call office for appt. TCm call complete.   Does the patient have an appointment with their PCP within 7-14 days of discharge? No   Nursing Interventions Patient declined scheduling/rescheduling appointment at this time, Routed TCM call to PCP office   Has home health visited the patient within 72 hours of discharge? Call prior to 72 hours   Psychosocial issues? No   Did the patient receive a copy of their discharge instructions? Yes   Nursing interventions Reviewed instructions with patient   What is the patient's perception of their health status since discharge? Improving   Is the patient/caregiver able to teach back signs and symptoms related to disease process for when to call PCP? Yes   Is the patient/caregiver able to teach back signs and symptoms related to disease process for when to call 911? Yes   Is the patient/caregiver able to teach back the hierarchy of who to call/visit for symptoms/problems? PCP, Specialist, Home health nurse, Urgent Care, ED, 911 Yes   TCM call completed? Yes   Wrap up additional comments Pt reports that  has called and will be setting up appts to come out. Pt reports that she is still weak. She declined PCP appt as she states her friend will need to make it as she takes her to the Dr. They will call office for appt.   Call end time 0922             Preethi Cohen RN    3/12/2025, 09:22 EDT

## 2025-03-20 ENCOUNTER — READMISSION MANAGEMENT (OUTPATIENT)
Dept: CALL CENTER | Facility: HOSPITAL | Age: 85
End: 2025-03-20
Payer: MEDICARE

## 2025-03-20 NOTE — OUTREACH NOTE
Medical Week 2 Survey      Flowsheet Row Responses   [facility] patient discharged from? [facility]   Does the patient have one of the following disease processes/diagnoses(primary or secondary)? Other   Week 2 attempt successful? Yes   Call start time 0333   Discharge diagnosis chest pain   Call end time 1539   Person spoke with today (if not patient) and relationship Patient   Meds reviewed with patient/caregiver? Yes   Does the patient have all medications ordered at discharge? Yes   Is the patient taking all medications as directed (includes completed medication regime)? Yes   Does the patient have a primary care provider?  Yes   Does the patient have an appointment with their PCP within 7 days of discharge? No   Comments regarding PCP Follow up with Liv Duron PCP   Nursing Interventions Educated patient on importance of making appointment, Advised patient to make appointment   Has the patient kept scheduled appointments due by today? N/A   What is the Home health agency?  [facility]   Psychosocial issues? No   Did the patient receive a copy of their discharge instructions? Yes   Nursing interventions Reviewed instructions with patient   What is the patient's perception of their health status since discharge? Improving   Is the patient/caregiver able to teach back signs and symptoms related to disease process for when to call PCP? Yes   Is the patient/caregiver able to teach back signs and symptoms related to disease process for when to call 911? Yes   Is the patient/caregiver able to teach back the hierarchy of who to call/visit for symptoms/problems? PCP, Specialist, Home health nurse, Urgent Care, ED, 911 Yes   If the patient is a current smoker, are they able to teach back resources for cessation? Not a smoker   Week 2 Call Completed? Yes   Is the patient interested in additional calls from an ambulatory ? No   Would this patient benefit from a Referral to [facility] Social Work? No   Call end time 1684             REILLY MCKEON - Registered Nurse

## 2025-03-26 ENCOUNTER — HOSPITAL ENCOUNTER (OUTPATIENT)
Facility: HOSPITAL | Age: 85
Setting detail: OBSERVATION
Discharge: HOME | End: 2025-03-27
Attending: STUDENT IN AN ORGANIZED HEALTH CARE EDUCATION/TRAINING PROGRAM | Admitting: STUDENT IN AN ORGANIZED HEALTH CARE EDUCATION/TRAINING PROGRAM
Payer: MEDICARE

## 2025-03-26 ENCOUNTER — APPOINTMENT (OUTPATIENT)
Dept: GENERAL RADIOLOGY | Facility: HOSPITAL | Age: 85
End: 2025-03-26
Payer: MEDICARE

## 2025-03-26 ENCOUNTER — READMISSION MANAGEMENT (OUTPATIENT)
Dept: CALL CENTER | Facility: HOSPITAL | Age: 85
End: 2025-03-26
Payer: MEDICARE

## 2025-03-26 DIAGNOSIS — R07.9 CHEST PAIN, UNSPECIFIED TYPE: Primary | ICD-10-CM

## 2025-03-26 DIAGNOSIS — R00.0 TACHYCARDIA: ICD-10-CM

## 2025-03-26 LAB
ALBUMIN SERPL-MCNC: 4.4 G/DL (ref 3.5–5.2)
ALBUMIN/GLOB SERPL: 1.5 G/DL
ALP SERPL-CCNC: 72 U/L (ref 39–117)
ALT SERPL W P-5'-P-CCNC: 9 U/L (ref 1–33)
ANION GAP SERPL CALCULATED.3IONS-SCNC: 11.1 MMOL/L (ref 5–15)
AST SERPL-CCNC: 18 U/L (ref 1–32)
BASOPHILS # BLD AUTO: 0.07 10*3/MM3 (ref 0–0.2)
BASOPHILS NFR BLD AUTO: 0.9 % (ref 0–1.5)
BILIRUB SERPL-MCNC: 0.6 MG/DL (ref 0–1.2)
BUN SERPL-MCNC: 20 MG/DL (ref 8–23)
BUN/CREAT SERPL: 21.3 (ref 7–25)
CALCIUM SPEC-SCNC: 9.4 MG/DL (ref 8.6–10.5)
CHLORIDE SERPL-SCNC: 100 MMOL/L (ref 98–107)
CO2 SERPL-SCNC: 24.9 MMOL/L (ref 22–29)
CREAT SERPL-MCNC: 0.94 MG/DL (ref 0.57–1)
D DIMER PPP FEU-MCNC: 0.6 MCGFEU/ML (ref 0–0.83)
DEPRECATED RDW RBC AUTO: 52 FL (ref 37–54)
EGFRCR SERPLBLD CKD-EPI 2021: 60.3 ML/MIN/1.73
EOSINOPHIL # BLD AUTO: 0.17 10*3/MM3 (ref 0–0.4)
EOSINOPHIL NFR BLD AUTO: 2.3 % (ref 0.3–6.2)
ERYTHROCYTE [DISTWIDTH] IN BLOOD BY AUTOMATED COUNT: 14.5 % (ref 12.3–15.4)
GEN 5 1HR TROPONIN T REFLEX: 9 NG/L
GLOBULIN UR ELPH-MCNC: 3 GM/DL
GLUCOSE SERPL-MCNC: 111 MG/DL (ref 65–99)
HCT VFR BLD AUTO: 41.7 % (ref 34–46.6)
HGB BLD-MCNC: 13.2 G/DL (ref 12–15.9)
HOLD SPECIMEN: NORMAL
IMM GRANULOCYTES # BLD AUTO: 0.02 10*3/MM3 (ref 0–0.05)
IMM GRANULOCYTES NFR BLD AUTO: 0.3 % (ref 0–0.5)
LYMPHOCYTES # BLD AUTO: 1.88 10*3/MM3 (ref 0.7–3.1)
LYMPHOCYTES NFR BLD AUTO: 25.2 % (ref 19.6–45.3)
MAGNESIUM SERPL-MCNC: 2.1 MG/DL (ref 1.6–2.4)
MCH RBC QN AUTO: 30.8 PG (ref 26.6–33)
MCHC RBC AUTO-ENTMCNC: 31.7 G/DL (ref 31.5–35.7)
MCV RBC AUTO: 97.4 FL (ref 79–97)
MONOCYTES # BLD AUTO: 0.45 10*3/MM3 (ref 0.1–0.9)
MONOCYTES NFR BLD AUTO: 6 % (ref 5–12)
NEUTROPHILS NFR BLD AUTO: 4.88 10*3/MM3 (ref 1.7–7)
NEUTROPHILS NFR BLD AUTO: 65.3 % (ref 42.7–76)
NRBC BLD AUTO-RTO: 0 /100 WBC (ref 0–0.2)
NT-PROBNP SERPL-MCNC: 2416 PG/ML (ref 0–1800)
PLATELET # BLD AUTO: 202 10*3/MM3 (ref 140–450)
PMV BLD AUTO: 9.5 FL (ref 6–12)
POTASSIUM SERPL-SCNC: 4.7 MMOL/L (ref 3.5–5.2)
PROT SERPL-MCNC: 7.4 G/DL (ref 6–8.5)
QT INTERVAL: 450 MS
QTC INTERVAL: 488 MS
RBC # BLD AUTO: 4.28 10*6/MM3 (ref 3.77–5.28)
SODIUM SERPL-SCNC: 136 MMOL/L (ref 136–145)
T4 FREE SERPL-MCNC: 1.07 NG/DL (ref 0.93–1.7)
TROPONIN T NUMERIC DELTA: -1 NG/L
TROPONIN T SERPL HS-MCNC: 10 NG/L
TSH SERPL DL<=0.05 MIU/L-ACNC: 4.57 UIU/ML (ref 0.27–4.2)
WBC NRBC COR # BLD AUTO: 7.47 10*3/MM3 (ref 3.4–10.8)

## 2025-03-26 PROCEDURE — 93005 ELECTROCARDIOGRAM TRACING: CPT

## 2025-03-26 PROCEDURE — 96375 TX/PRO/DX INJ NEW DRUG ADDON: CPT

## 2025-03-26 PROCEDURE — 36415 COLL VENOUS BLD VENIPUNCTURE: CPT

## 2025-03-26 PROCEDURE — 84484 ASSAY OF TROPONIN QUANT: CPT | Performed by: PHYSICIAN ASSISTANT

## 2025-03-26 PROCEDURE — 84439 ASSAY OF FREE THYROXINE: CPT

## 2025-03-26 PROCEDURE — 83735 ASSAY OF MAGNESIUM: CPT

## 2025-03-26 PROCEDURE — G0378 HOSPITAL OBSERVATION PER HR: HCPCS

## 2025-03-26 PROCEDURE — 25010000002 MORPHINE PER 10 MG

## 2025-03-26 PROCEDURE — 80053 COMPREHEN METABOLIC PANEL: CPT | Performed by: PHYSICIAN ASSISTANT

## 2025-03-26 PROCEDURE — 99285 EMERGENCY DEPT VISIT HI MDM: CPT

## 2025-03-26 PROCEDURE — 85025 COMPLETE CBC W/AUTO DIFF WBC: CPT | Performed by: PHYSICIAN ASSISTANT

## 2025-03-26 PROCEDURE — 85379 FIBRIN DEGRADATION QUANT: CPT | Performed by: PHYSICIAN ASSISTANT

## 2025-03-26 PROCEDURE — 84443 ASSAY THYROID STIM HORMONE: CPT

## 2025-03-26 PROCEDURE — 96376 TX/PRO/DX INJ SAME DRUG ADON: CPT

## 2025-03-26 PROCEDURE — 96374 THER/PROPH/DIAG INJ IV PUSH: CPT

## 2025-03-26 PROCEDURE — 71045 X-RAY EXAM CHEST 1 VIEW: CPT

## 2025-03-26 PROCEDURE — 83880 ASSAY OF NATRIURETIC PEPTIDE: CPT | Performed by: PHYSICIAN ASSISTANT

## 2025-03-26 PROCEDURE — 93005 ELECTROCARDIOGRAM TRACING: CPT | Performed by: STUDENT IN AN ORGANIZED HEALTH CARE EDUCATION/TRAINING PROGRAM

## 2025-03-26 PROCEDURE — 99214 OFFICE O/P EST MOD 30 MIN: CPT | Performed by: INTERNAL MEDICINE

## 2025-03-26 RX ORDER — NITROGLYCERIN 0.4 MG/1
0.4 TABLET SUBLINGUAL
Status: DISCONTINUED | OUTPATIENT
Start: 2025-03-26 | End: 2025-03-27 | Stop reason: HOSPADM

## 2025-03-26 RX ORDER — DILTIAZEM HYDROCHLORIDE 30 MG/1
30 TABLET, FILM COATED ORAL EVERY 8 HOURS SCHEDULED
Status: DISCONTINUED | OUTPATIENT
Start: 2025-03-26 | End: 2025-03-27 | Stop reason: HOSPADM

## 2025-03-26 RX ORDER — MORPHINE SULFATE 2 MG/ML
1 INJECTION, SOLUTION INTRAMUSCULAR; INTRAVENOUS EVERY 4 HOURS PRN
Status: DISCONTINUED | OUTPATIENT
Start: 2025-03-26 | End: 2025-03-27 | Stop reason: HOSPADM

## 2025-03-26 RX ORDER — SPIRONOLACTONE 25 MG/1
25 TABLET ORAL DAILY
Status: DISCONTINUED | OUTPATIENT
Start: 2025-03-26 | End: 2025-03-27 | Stop reason: HOSPADM

## 2025-03-26 RX ORDER — POLYETHYLENE GLYCOL 3350 17 G/17G
17 POWDER, FOR SOLUTION ORAL DAILY PRN
Status: DISCONTINUED | OUTPATIENT
Start: 2025-03-26 | End: 2025-03-27 | Stop reason: HOSPADM

## 2025-03-26 RX ORDER — METOPROLOL SUCCINATE 50 MG/1
100 TABLET, EXTENDED RELEASE ORAL DAILY
Status: DISCONTINUED | OUTPATIENT
Start: 2025-03-26 | End: 2025-03-27 | Stop reason: HOSPADM

## 2025-03-26 RX ORDER — ASPIRIN 81 MG/1
324 TABLET, CHEWABLE ORAL ONCE
Status: COMPLETED | OUTPATIENT
Start: 2025-03-26 | End: 2025-03-26

## 2025-03-26 RX ORDER — BISACODYL 10 MG
10 SUPPOSITORY, RECTAL RECTAL DAILY PRN
Status: DISCONTINUED | OUTPATIENT
Start: 2025-03-26 | End: 2025-03-27 | Stop reason: HOSPADM

## 2025-03-26 RX ORDER — ONDANSETRON 4 MG/1
4 TABLET, ORALLY DISINTEGRATING ORAL EVERY 6 HOURS PRN
Status: DISCONTINUED | OUTPATIENT
Start: 2025-03-26 | End: 2025-03-27 | Stop reason: HOSPADM

## 2025-03-26 RX ORDER — AMOXICILLIN 250 MG
2 CAPSULE ORAL 2 TIMES DAILY PRN
Status: DISCONTINUED | OUTPATIENT
Start: 2025-03-26 | End: 2025-03-27 | Stop reason: HOSPADM

## 2025-03-26 RX ORDER — ATORVASTATIN CALCIUM 20 MG/1
20 TABLET, FILM COATED ORAL NIGHTLY
Status: DISCONTINUED | OUTPATIENT
Start: 2025-03-26 | End: 2025-03-27 | Stop reason: HOSPADM

## 2025-03-26 RX ORDER — BISACODYL 5 MG/1
5 TABLET, DELAYED RELEASE ORAL DAILY PRN
Status: DISCONTINUED | OUTPATIENT
Start: 2025-03-26 | End: 2025-03-27 | Stop reason: HOSPADM

## 2025-03-26 RX ORDER — DILTIAZEM HCL/D5W 125 MG/125
5-15 PLASTIC BAG, INJECTION (ML) INTRAVENOUS CONTINUOUS
Status: DISCONTINUED | OUTPATIENT
Start: 2025-03-26 | End: 2025-03-26

## 2025-03-26 RX ORDER — LEVOTHYROXINE SODIUM 50 UG/1
50 TABLET ORAL
Status: DISCONTINUED | OUTPATIENT
Start: 2025-03-27 | End: 2025-03-27 | Stop reason: HOSPADM

## 2025-03-26 RX ORDER — METOPROLOL TARTRATE 1 MG/ML
5 INJECTION, SOLUTION INTRAVENOUS ONCE
Status: COMPLETED | OUTPATIENT
Start: 2025-03-26 | End: 2025-03-26

## 2025-03-26 RX ORDER — NALOXONE HCL 0.4 MG/ML
0.4 VIAL (ML) INJECTION
Status: DISCONTINUED | OUTPATIENT
Start: 2025-03-26 | End: 2025-03-27 | Stop reason: HOSPADM

## 2025-03-26 RX ORDER — FERROUS SULFATE 325(65) MG
324 TABLET ORAL EVERY OTHER DAY
Status: DISCONTINUED | OUTPATIENT
Start: 2025-03-26 | End: 2025-03-26

## 2025-03-26 RX ORDER — DILTIAZEM HYDROCHLORIDE 30 MG/1
30 TABLET, FILM COATED ORAL 2 TIMES DAILY
Status: DISCONTINUED | OUTPATIENT
Start: 2025-03-26 | End: 2025-03-26

## 2025-03-26 RX ORDER — PANTOPRAZOLE SODIUM 40 MG/1
40 TABLET, DELAYED RELEASE ORAL DAILY
Status: DISCONTINUED | OUTPATIENT
Start: 2025-03-26 | End: 2025-03-27 | Stop reason: HOSPADM

## 2025-03-26 RX ORDER — ONDANSETRON 2 MG/ML
4 INJECTION INTRAMUSCULAR; INTRAVENOUS EVERY 6 HOURS PRN
Status: DISCONTINUED | OUTPATIENT
Start: 2025-03-26 | End: 2025-03-27 | Stop reason: HOSPADM

## 2025-03-26 RX ORDER — FERROUS SULFATE 325(65) MG
325 TABLET ORAL EVERY OTHER DAY
Status: DISCONTINUED | OUTPATIENT
Start: 2025-03-26 | End: 2025-03-27 | Stop reason: HOSPADM

## 2025-03-26 RX ADMIN — ATORVASTATIN CALCIUM 20 MG: 20 TABLET, FILM COATED ORAL at 20:43

## 2025-03-26 RX ADMIN — ASPIRIN 81 MG CHEWABLE TABLET 324 MG: 81 TABLET CHEWABLE at 07:36

## 2025-03-26 RX ADMIN — PANTOPRAZOLE SODIUM 40 MG: 40 TABLET, DELAYED RELEASE ORAL at 12:57

## 2025-03-26 RX ADMIN — FERROUS SULFATE TAB 325 MG (65 MG ELEMENTAL FE) 325 MG: 325 (65 FE) TAB at 12:57

## 2025-03-26 RX ADMIN — DILTIAZEM HYDROCHLORIDE 30 MG: 30 TABLET, FILM COATED ORAL at 20:43

## 2025-03-26 RX ADMIN — Medication 5 MG: at 22:06

## 2025-03-26 RX ADMIN — DILTIAZEM HYDROCHLORIDE 30 MG: 30 TABLET, FILM COATED ORAL at 12:57

## 2025-03-26 RX ADMIN — SPIRONOLACTONE 25 MG: 25 TABLET ORAL at 12:57

## 2025-03-26 RX ADMIN — METOPROLOL TARTRATE 5 MG: 5 INJECTION INTRAVENOUS at 08:46

## 2025-03-26 RX ADMIN — Medication 5 MG/HR: at 10:11

## 2025-03-26 RX ADMIN — APIXABAN 5 MG: 5 TABLET, FILM COATED ORAL at 20:43

## 2025-03-26 RX ADMIN — MORPHINE SULFATE 1 MG: 2 INJECTION, SOLUTION INTRAMUSCULAR; INTRAVENOUS at 20:47

## 2025-03-26 RX ADMIN — METOPROLOL TARTRATE 5 MG: 5 INJECTION INTRAVENOUS at 08:05

## 2025-03-26 RX ADMIN — METOPROLOL SUCCINATE 100 MG: 50 TABLET, EXTENDED RELEASE ORAL at 12:58

## 2025-03-26 RX ADMIN — APIXABAN 5 MG: 5 TABLET, FILM COATED ORAL at 12:58

## 2025-03-26 NOTE — OUTREACH NOTE
Medical Week 3 Survey      Flowsheet Row Responses   [facility] facility patient discharged from? [facility]   Does the patient have one of the following disease processes/diagnoses(primary or secondary)? Other   Week 3 attempt successful? No   Unsuccessful attempts Attempt 1   Revoke Readmitted   Revoke Readmitted            Lucila DE SOUZA - Registered Nurse

## 2025-03-26 NOTE — CONSULTS
CARDIOLOGY CONSULT:    Catalina Moreno  1940  female  [id number]      Referring Provider: Hospitalist  Reason for Consultation: Atrial fibrillation    Patient Care Team:  Liv Duron APRN as PCP - General (Nurse Practitioner)  Flash Gonzalez MD as Consulting Physician (Cardiology)  Shefali Worthy MD as Consulting Physician (Pain Medicine)  Demetrice Gupta APRN as Nurse Practitioner (Cardiology)    Chief complaint palpitations    Subjective .     History of present illness:  Catalina Moreno is a 84 y.o. female with history of paroxysmal fibrillation coronary disease HFrEF valvular heart disease sick sinus syndrome status post pacemaker placement hypertension hyperlipidemia presented to hospital complains of palpitations which started all of a sudden.  No complaint of chest pain or shortness of breath.  No PND orthopnea.  No dizziness syncope or swelling of the feet.  Patient says that she has been taking her medicines regular.  In the ER patient had an EKG which showed atrial fibrillation with rapid response.  She was started on Cardizem drip and IV Lopressor and her heart rates are better now.  She is then admitted and ruled out for MI by EKG and enzymes.  Review of Systems   Constitutional: Negative for fever and malaise/fatigue.   HENT:  Negative for ear pain and nosebleeds.    Eyes:  Negative for blurred vision and double vision.   Cardiovascular:  Positive for palpitations. Negative for chest pain and dyspnea on exertion.   Respiratory:  Negative for cough and shortness of breath.    Skin:  Negative for rash.   Musculoskeletal:  Negative for joint pain.   Gastrointestinal:  Negative for abdominal pain, nausea and vomiting.   Neurological:  Negative for focal weakness and headaches.   Psychiatric/Behavioral:  Negative for depression. The patient is not nervous/anxious.    All other systems reviewed and are negative.      History  Past Medical History:   Diagnosis Date    Acquired  spondylolisthesis of lumbosacral region     Acute kidney injury 07/22/2022    Anxiety     Arthritis     Atrial fibrillation     paroxysmal    Broken shoulder     left-- due to fall 11-7-19 was at [facility]    Chronic pain disorder     Chronic systolic CHF (congestive heart failure) 01/05/2023    EF 36-40%    Coronary artery disease involving native coronary artery of native heart without angina pectoris 04/02/2021    nonobstructive    DDD (degenerative disc disease), cervical     DDD (degenerative disc disease), lumbar     Depression     Edema     9/2020 foot    GERD (gastroesophageal reflux disease)     H/O fall     Heart failure with mid-range ejection fraction 03/05/2022    EF 45% per 2D echo    Hypertension     Hypothyroidism     Insomnia     Low back pain     Moderate mitral regurgitation 01/05/2023    Neuropathy     Nonrheumatic tricuspid valve regurgitation     Pain in both feet     Polypharmacy     PONV (postoperative nausea and vomiting)     Pulmonary hypertension     Radiculopathy     Restless legs     Sacroiliitis     Sick sinus syndrome     Added automatically from request for surgery [id number]    Spondylolisthesis     Stage 3a chronic kidney disease     Urinary incontinence     Vitamin D deficiency        Past Surgical History:   Procedure Laterality Date    BACK SURGERY  07/19/2018    PDC &  PSF L3-L5 insitu    CARDIAC CATHETERIZATION N/A 4/2/2021    Procedure: Cardiac Catheterization/Vascular Study;  Surgeon: Barrett Evans MD;  Location:  [facility];  Service: Cardiovascular;  Laterality: N/A;    CARDIAC ELECTROPHYSIOLOGY PROCEDURE N/A 1/17/2023    Procedure: Pacemaker DC new;  Surgeon: Baljeet Valero MD;  Location: [facility] CATH INVASIVE LOCATION;  Service: Cardiovascular;  Laterality: N/A;    CHOLECYSTECTOMY      COLONOSCOPY      COLONOSCOPY N/A 7/10/2024    Procedure: COLONOSCOPY with cold snare polypectomy x1 and endoscopic clipping x2;  Surgeon: MARLIN Vanegas MD;   Location: [facility] ENDOSCOPY;  Service: Gastroenterology;  Laterality: N/A;  Post- colon polyps, diverticulosis    ENDOSCOPY N/A 9/14/2023    Procedure: ESOPHAGOGASTRODUODENOSCOPY;  Surgeon: Ulysses Lamas MD;  Location: [facility] ENDOSCOPY;  Service: Gastroenterology;  Laterality: N/A;  gastritis, inflammatory gastric polyp    ENDOSCOPY N/A 7/10/2024    Procedure: ESOPHAGOGASTRODUODENOSCOPY;  Surgeon: MARLIN Vanegas MD;  Location: [facility] ENDOSCOPY;  Service: Gastroenterology;  Laterality: N/A;  Post- hiatal hernia, gastric polyps, gastritis    HYSTERECTOMY      ROTATOR CUFF REPAIR Left        Family History   Problem Relation Age of Onset    Cancer Father     Diabetes Son     Cancer Paternal Aunt     Heart disease Paternal Aunt     Cancer Paternal Uncle        Social History     Tobacco Use    Smoking status: Never     Passive exposure: Never    Smokeless tobacco: Never   Vaping Use    Vaping status: Never Used   Substance Use Topics    Alcohol use: No    Drug use: Never        Medications Prior to Admission   Medication Sig Dispense Refill Last Dose/Taking    apixaban (Eliquis) 5 MG tablet tablet TAKE 1 TABLET BY MOUTH EVERY 12 HOURS FOR ATRIAL FIBRILLATION 60 tablet 1 Taking    atorvastatin (Lipitor) 20 MG tablet Take 1 tablet by mouth Daily for 90 days. 90 tablet 0 Taking    dilTIAZem (CARDIZEM) 30 MG tablet Take 1 tablet by mouth 2 (Two) Times a Day.   Taking    ferrous gluconate (FERGON) 324 MG tablet Take 1 tablet by mouth Every Other Day. 45 tablet 1 Taking    levothyroxine (SYNTHROID, LEVOTHROID) 50 MCG tablet Take 1 tablet by mouth Daily. 90 tablet 1 Taking    Magnesium 250 MG capsule Take 1 capsule by mouth Daily.   Taking    metoprolol succinate XL (TOPROL-XL) 100 MG 24 hr tablet Take 1 tablet by mouth Daily. 30 tablet 2 Taking    pantoprazole (PROTONIX) 40 MG EC tablet TAKE 1 TABLET BY MOUTH EVERY DAY 90 tablet 1 Taking    Vitamin D, Cholecalciferol, (CHOLECALCIFEROL) 10 MCG (400 UNIT) tablet  "Take 1 tablet by mouth Daily.   Taking    spironolactone (ALDACTONE) 25 MG tablet Take 1 tablet by mouth Daily.          Allergies: Baclofen, Codeine, Ibuprofen, Methocarbamol, Naproxen, Tizanidine hcl, and Tolmetin    Scheduled Meds:apixaban, 5 mg, Oral, Q12H  atorvastatin, 20 mg, Oral, Nightly  dilTIAZem, 30 mg, Oral, BID  ferrous sulfate, 325 mg, Oral, Every Other Day  [START ON 3/27/2025] levothyroxine, 50 mcg, Oral, Q AM  metoprolol succinate XL, 100 mg, Oral, Daily  pantoprazole, 40 mg, Oral, Daily  spironolactone, 25 mg, Oral, Daily      Continuous Infusions:   PRN Meds:.  senna-docusate sodium **AND** polyethylene glycol **AND** bisacodyl **AND** bisacodyl    Calcium Replacement - Follow Nurse / BPA Driven Protocol    Magnesium Standard Dose Replacement - Follow Nurse / BPA Driven Protocol    melatonin    Morphine **AND** naloxone    nitroglycerin    ondansetron ODT **OR** ondansetron    Phosphorus Replacement - Follow Nurse / BPA Driven Protocol    Potassium Replacement - Follow Nurse / BPA Driven Protocol    Objective     VITAL SIGNS  Vitals:    03/26/25 1145 03/26/25 1200 03/26/25 1215 03/26/25 1239   BP: 130/71 134/80 147/81 131/71   BP Location:    Right arm   Patient Position:    Lying   Pulse: 71 70 71 70   Resp:  18  20   Temp:    97.8 °F (36.6 °C)   TempSrc:    Oral   SpO2: 94% 94% 94%    Weight:       Height:           Flowsheet Rows      Flowsheet Row First Filed Value   Admission Height 152.4 cm (60\") Documented at 03/26/2025 0633   Admission Weight 69.9 kg (154 lb) Documented at 03/26/2025 0729             TELEMETRY: Atrial fibrillation with controlled ventricular response and a ventricular pacemaker rhythm and a demand mode    Physical Exam:  Constitutional:       Appearance: Well-developed.   Eyes:      General: No scleral icterus.     Conjunctiva/sclera: Conjunctivae normal.      Pupils: Pupils are equal, round, and reactive to light.   HENT:      Head: Normocephalic and atraumatic.   Neck: "      Vascular: No carotid bruit or JVD.   Pulmonary:      Effort: Pulmonary effort is normal.      Breath sounds: Normal breath sounds. No wheezing. No rales.   Cardiovascular:      Normal rate. Irregularly irregular rhythm.   Pulses:     Intact distal pulses.   Abdominal:      General: Bowel sounds are normal.      Palpations: Abdomen is soft.   Musculoskeletal: Normal range of motion.      Cervical back: Normal range of motion and neck supple. Skin:     General: Skin is warm and dry.      Findings: No rash.   Neurological:      Mental Status: Alert.      Comments: No focal deficits          Results Review:   I reviewed the patient's new clinical results.  Lab Results (last 24 hours)       Procedure Component Value Units Date/Time    T4, Free [[id number]]  (Normal) Collected: 03/26/25 0845    Specimen: Blood Updated: 03/26/25 1242     Free T4 1.07 ng/dL     TSH Rfx On Abnormal To Free T4 [[id number]]  (Abnormal) Collected: 03/26/25 0845    Specimen: Blood Updated: 03/26/25 1215     TSH 4.570 uIU/mL     Magnesium [[id number]]  (Normal) Collected: 03/26/25 0845    Specimen: Blood Updated: 03/26/25 1149     Magnesium 2.1 mg/dL     High Sensitivity Troponin T 1Hr [[id number]]  (Normal) Collected: 03/26/25 0845    Specimen: Blood Updated: 03/26/25 0919     HS Troponin T 9 ng/L      Troponin T Numeric Delta -1 ng/L     Narrative:      High Sensitive Troponin T Reference Range:  <14.0 ng/L- Negative Female for AMI  <22.0 ng/L- Negative Male for AMI  >=14 - Abnormal Female indicating possible myocardial injury.  >=22 - Abnormal Male indicating possible myocardial injury.   Clinicians would have to utilize clinical acumen, EKG, Troponin, and serial changes to determine if it is an Acute Myocardial Infarction or myocardial injury due to an underlying chronic condition.         High Sensitivity Troponin T [[id number]]  (Normal) Collected: 03/26/25 0724    Specimen: Blood from Arm, Left Updated: 03/26/25 0803     HS Troponin T 10  ng/L     Narrative:      High Sensitive Troponin T Reference Range:  <14.0 ng/L- Negative Female for AMI  <22.0 ng/L- Negative Male for AMI  >=14 - Abnormal Female indicating possible myocardial injury.  >=22 - Abnormal Male indicating possible myocardial injury.   Clinicians would have to utilize clinical acumen, EKG, Troponin, and serial changes to determine if it is an Acute Myocardial Infarction or myocardial injury due to an underlying chronic condition.         BNP [[id number]]  (Abnormal) Collected: 03/26/25 0724    Specimen: Blood from Arm, Left Updated: 03/26/25 0803     proBNP 2,416.0 pg/mL     Narrative:      This assay is used as an aid in the diagnosis of individuals suspected of having heart failure. It can be used as an aid in the diagnosis of acute decompensated heart failure (ADHF) in patients presenting with signs and symptoms of ADHF to the emergency department (ED). In addition, NT-proBNP of <300 pg/mL indicates ADHF is not likely.    Age Range Result Interpretation  NT-proBNP Concentration (pg/mL:      <50             Positive            >450                   Gray                 300-450                    Negative             <300    50-75           Positive            >900                  Gray                300-900                  Negative            <300      >75             Positive            >1800                  Gray                300-1800                  Negative            <300    Comprehensive Metabolic Panel [[id number]]  (Abnormal) Collected: 03/26/25 0724    Specimen: Blood from Arm, Left Updated: 03/26/25 0803     Glucose 111 mg/dL      BUN 20 mg/dL      Creatinine 0.94 mg/dL      Sodium 136 mmol/L      Potassium 4.7 mmol/L      Chloride 100 mmol/L      CO2 24.9 mmol/L      Calcium 9.4 mg/dL      Total Protein 7.4 g/dL      Albumin 4.4 g/dL      ALT (SGPT) 9 U/L      AST (SGOT) 18 U/L      Alkaline Phosphatase 72 U/L      Total Bilirubin 0.6 mg/dL      Globulin 3.0 gm/dL       "A/G Ratio 1.5 g/dL      BUN/Creatinine Ratio 21.3     Anion Gap 11.1 mmol/L      eGFR 60.3 mL/min/1.73     Narrative:      GFR Categories in Chronic Kidney Disease (CKD)      GFR Category          GFR (mL/min/1.73)    Interpretation  G1                     90 or greater         Normal or high (1)  G2                      60-89                Mild decrease (1)  G3a                   45-59                Mild to moderate decrease  G3b                   30-44                Moderate to severe decrease  G4                    15-29                Severe decrease  G5                    14 or less           Kidney failure          (1)In the absence of evidence of kidney disease, neither GFR category G1 or G2 fulfill the criteria for CKD.    eGFR calculation 2021 CKD-EPI creatinine equation, which does not include race as a factor    D-dimer, Quantitative [[id number]]  (Normal) Collected: 03/26/25 0724    Specimen: Blood from Arm, Left Updated: 03/26/25 0740     D-Dimer, Quantitative 0.60 MCGFEU/mL     Narrative:      According to the assay 's published package insert, a normal (<0.50 MCGFEU/mL) D-dimer result in conjunction with a non-high clinical probability assessment, excludes deep vein thrombosis (DVT) and pulmonary embolism (PE) with high sensitivity.    D-dimer values increase with age and this can make VTE exclusion of an older population difficult. To address this, the [employer], based on best available evidence and recent guidelines, recommends that clinicians use age-adjusted D-dimer thresholds in patients greater than 50 years of age with: a) a low probability of PE who do not meet all Pulmonary Embolism Rule Out Criteria, or b) in those with intermediate probability of PE.   The formula for an age-adjusted D-dimer cut-off is \"age/100\".  For example, a 60 year old patient would have an age-adjusted cut-off of 0.60 MCGFEU/mL and an 80 year old 0.80 MCGFEU/mL.    Extra Tubes " [[id number]] Collected: 03/26/25 0724    Specimen: Blood from Arm, Left Updated: 03/26/25 0731    Narrative:      The following orders were created for panel order Extra Tubes.  Procedure                               Abnormality         Status                     ---------                               -----------         ------                     Gold Top - SST[[id number]]                                   Final result                 Please view results for these tests on the individual orders.    Gold Top - SST [[id number]] Collected: 03/26/25 0724    Specimen: Blood from Arm, Left Updated: 03/26/25 0731     Extra Tube Hold for add-ons.     Comment: Auto resulted.       CBC & Differential [[id number]]  (Abnormal) Collected: 03/26/25 0724    Specimen: Blood from Arm, Left Updated: 03/26/25 0730    Narrative:      The following orders were created for panel order CBC & Differential.  Procedure                               Abnormality         Status                     ---------                               -----------         ------                     CBC Auto Differential[[id number]]        Abnormal            Final result                 Please view results for these tests on the individual orders.    CBC Auto Differential [[id number]]  (Abnormal) Collected: 03/26/25 0724    Specimen: Blood from Arm, Left Updated: 03/26/25 0730     WBC 7.47 10*3/mm3      RBC 4.28 10*6/mm3      Hemoglobin 13.2 g/dL      Hematocrit 41.7 %      MCV 97.4 fL      MCH 30.8 pg      MCHC 31.7 g/dL      RDW 14.5 %      RDW-SD 52.0 fl      MPV 9.5 fL      Platelets 202 10*3/mm3      Neutrophil % 65.3 %      Lymphocyte % 25.2 %      Monocyte % 6.0 %      Eosinophil % 2.3 %      Basophil % 0.9 %      Immature Grans % 0.3 %      Neutrophils, Absolute 4.88 10*3/mm3      Lymphocytes, Absolute 1.88 10*3/mm3      Monocytes, Absolute 0.45 10*3/mm3      Eosinophils, Absolute 0.17 10*3/mm3      Basophils, Absolute 0.07 10*3/mm3      Immature Grans,  Absolute 0.02 10*3/mm3      nRBC 0.0 /100 WBC             Imaging Results (Last 24 Hours)       Procedure Component Value Units Date/Time    XR Chest 1 View [[id number]] Collected: 03/26/25 0752     Updated: 03/26/25 0806    Narrative:      XR CHEST 1 VW    Date of Exam: 3/26/2025 7:28 AM EDT    Indication: Chest pain    Comparison: 3/10/2025    Findings:  Cardiomediastinal silhouette is prominent, stable. There is a left-sided AICD. No airspace disease, pneumothorax, nor pleural effusion. No acute osseous abnormality identified.      Impression:      Impression:  No acute cardiopulmonary abnormality.      Electronically Signed: Celia Cummings MD    3/26/2025 8:04 AM EDT    Workstation ID: [id number]            EKG      I personally viewed and interpreted the patient's EKG/Telemetry data:    ECHOCARDIOGRAM:  Results for orders placed during the hospital encounter of 01/29/25    Adult Transesophageal Echo (CHEMO) W/ Cont if Necessary Per Protocol    Interpretation Summary    Left ventricular systolic function is normal. Estimated left ventricular EF = 55% Left ventricular ejection fraction appears to be 51 - 55%.    Left ventricular wall thickness is consistent with mild concentric hypertrophy.    Left ventricular diastolic function is consistent with (grade III w/high LAP) reversible restrictive pattern.    The right ventricular cavity is mild to moderately dilated.    The left atrial cavity is severely dilated.    The right atrial cavity is moderate to severely  dilated.    Abnormal mitral valve structure consistent with dilated annulus.    Moderate to severe mitral valve regurgitation is present with a centrally-directed jet noted.    Moderate tricuspid valve regurgitation is present.    Estimated right ventricular systolic pressure from tricuspid regurgitation is mildly elevated (35-45 mmHg).    There is a trivial pericardial effusion.    Procedure indication  Persistent symptomatic atrial fibrillation    conscious  sedation administered by anesthesia  Timeout before procedure    consent obtained before procedure      Procedure note  after obtaining a valid consent patient was sedated by Anesthesia and a CHEMO probe was easily placed into esophagus with multiplane imaging with 2D, color and Doppler followed by bubble study with agitated saline without any complications    CHEMO  Findings    LV ejection fraction between 51 to 55%  Severe left atrial enlargement with mitral annular dilatation with moderate to severe central mitral regurgitation and moderate TR with mild pulm hypertension  Trivial effusion noted  No shunt    Plan  Rate control only because of severe left enlargement and not a good candidate for rhythm control and ablation to be deferred  Increase the beta-blocker dose  Stop Cardizem  Outpatient follow-up    Complications none      Procedure done  Transesophageal echocardiography      Electronically signed by Baljeet Valero MD, 25, 3:12 PM EST.         STRESS MYOVIEW:  Results for orders placed during the hospital encounter of 03/10/25    Stress Test With Myocardial Perfusion One Day    Interpretation Summary    Myocardial perfusion imaging indicates a normal myocardial perfusion study with no evidence of ischemia. Impressions are consistent with a low risk study.    Left ventricular ejection fraction is hyperdynamic (Calculated EF > 70%).    LEXISCAN CARDIOLITE REPORT    DATE OF PROCEDURE:  03/10/25    INDICATION FOR PROCEDURE:  Chest pain, CAD    PROCEDURE PERFORMED: Lexiscan Cardiolite    PROCEDURE COMMENTS:    After informed consent was obtained.  Patient's resting heart rate was 96 bpm, resting blood pressure was 150/98, resting EKG revealed sinus.  Patient was given 0.4 mg of regadenosine for stress testing.  There was no significant change in heart rate, blood pressure, symptoms with regadenoson injection.  Patient tolerated procedure well.  Complications were none.    NUCLEAR IMAGIN.  There  was  uniform uptake of Cardiolite both in resting and post stress images, no ischemia seen.  2.  Gated images reveal  normal LV size and contractility, LVEF of 74%.    CONCLUSION:  1.  Lexiscan Cardiolite with  normal perfusion, negative for  ischemia.  2.  Normal wall motion .  LVEF of 74%.    RECOMMENDATIONS:    Clinical correlation recommended.      Nasir Celis MD  03/10/25  15:43 EDT       CARDIAC CATHETERIZATION:    Results for orders placed during the hospital encounter of 03/31/21    Cardiac Catheterization/Vascular Study    Narrative  CARDIAC CATHETERIZATION REPORT    DATE OF PROCEDURE: 4/2/2021      INDICATION FOR PROCEDURE: Abnormal stress test    PROCEDURE PERFORMED: Left heart catheterization, coronary angiography,    PROCEDURE COMMENTS:    All the risks and benefits explained with the patient informed consent was obtained from the patient.  Patient was brought to the cardiac catheterization laboratory placed on the table draped and prepped in the usual sterile fashion.  2% lidocaine was used to anesthetize the right groin area.  Using the modified Seldinger technique 5 [place] sheath was inserted into the right femoral artery.    5 [place] JL4 catheter was used to perform the selective left coronary angiography    5 [place] JR4 catheter was used to perform the selective right coronary angiography    Angio-Seal was placed after exchanging five [place] sheath with six [place] sheath    Patient tolerated procedure well without any immediate complications      FINDINGS:    1. HEMODYNAMICS:  LV end-diastolic pressure 7 mmHg, /80 mmHg.    2. LEFT VENTRICULOGRAPHY: Not done patient had echocardiogram    3. CORONARY ANGIOGRAPHY:  Dominance right  Left Main: Large-caliber vessel bifurcates into LAD circumflex artery 0% stenosis  LAD: Large-caliber vessel gives rise to couple of medium caliber diagonal arteries multiple septal branches reaches the apex mild luminal irregularities noted less than 10%  stenosis  CIRC: Large-caliber vessel gives rise to marginal lateral branches less than 10% stenosis  RCA: Large-caliber dominant artery gives rise to PDA and PL branches less than 5 to 10% stenosis    SUMMARY: No obstructive coronary artery disease    RECOMMENDATIONS: Evaluate for noncardiac causes of symptoms aggressive cardiac risk factor modification       OTHER:         MDM      Atrial fibrillation  Patient presented with palpitations and had his atrial fibrillation with rapid response and hence I will continue the current medical therapy of beta-blockers but increase the Cardizem dose and continue Eliquis  Patient is ruled out for MI by EKG and enzymes.    Coronary disease  Patient has very minor and nonobstructive coronary disease with normal LV function is stable    HFrEF  Patient had HFrEF in the past but her LV function has improved.    Sick sinus syndrome status post pacemaker placement  Patient's pacemaker is working very well.    Hypertension  Patient blood pressure currently stable on metoprolol and Cardizem    Hyperlipidemia  Patient is on statins and the lipid levels are well within normal limits.  I discussed the patients findings and my recommendations with patient and nurse    Flash Gonzalez MD  03/26/25  14:41 EDT

## 2025-03-26 NOTE — ED PROVIDER NOTES
Subjective   History of Present Illness  84-year-old female presents emergency room by EMS with complaints of pressure around her heart that woke her up last night while sleeping.  Patient states the symptoms lasted 15-20 minutes EMS gave her something but she was not sure possibly nitro or aspirin.  Patient states at the time of initial evaluation there is no chest pain tightness or her heart has no pressure symptoms at this time.  Patient states that initially the symptoms did accompany with nausea no vomiting no dizziness headache no loss of consciousness pain or pressure did not radiate to jaw neck or back.        Review of Systems   Constitutional:  Negative for chills, fatigue and fever.   Respiratory:  Positive for chest tightness. Negative for cough and shortness of breath.    Cardiovascular:  Positive for chest pain. Negative for palpitations and leg swelling.   Gastrointestinal:  Negative for abdominal pain, diarrhea, nausea and vomiting.       Past Medical History:   Diagnosis Date    Acquired spondylolisthesis of lumbosacral region     Acute kidney injury 07/22/2022    Anxiety     Arthritis     Atrial fibrillation     paroxysmal    Broken shoulder     left-- due to fall 11-7-19 was at [facility]    Chronic pain disorder     Chronic systolic CHF (congestive heart failure) 01/05/2023    EF 36-40%    Coronary artery disease involving native coronary artery of native heart without angina pectoris 04/02/2021    nonobstructive    DDD (degenerative disc disease), cervical     DDD (degenerative disc disease), lumbar     Depression     Edema     9/2020 foot    GERD (gastroesophageal reflux disease)     H/O fall     Heart failure with mid-range ejection fraction 03/05/2022    EF 45% per 2D echo    Hypertension     Hypothyroidism     Insomnia     Low back pain     Moderate mitral regurgitation 01/05/2023    Neuropathy     Nonrheumatic tricuspid valve regurgitation     Pain in both feet     Polypharmacy     PONV  (postoperative nausea and vomiting)     Pulmonary hypertension     Radiculopathy     Restless legs     Sacroiliitis     Sick sinus syndrome     Added automatically from request for surgery [id number]    Spondylolisthesis     Stage 3a chronic kidney disease     Urinary incontinence     Vitamin D deficiency        Allergies   Allergen Reactions    Baclofen Rash    Codeine Nausea Only    Ibuprofen Unknown (See Comments)     Patient doesn't know----Motin    Methocarbamol Unknown (See Comments)     Patient doesn't know    Naproxen Unknown (See Comments)     Pt. Doesn't know     Tizanidine Hcl Other (See Comments)     Syncope     Tolmetin Dizziness     Pt. Doesn't know-- same as Tolectin       Past Surgical History:   Procedure Laterality Date    BACK SURGERY  07/19/2018    PDC &  PSF L3-L5 insitu    CARDIAC CATHETERIZATION N/A 4/2/2021    Procedure: Cardiac Catheterization/Vascular Study;  Surgeon: Barrett Evans MD;  Location: [facility] CATH INVASIVE LOCATION;  Service: Cardiovascular;  Laterality: N/A;    CARDIAC ELECTROPHYSIOLOGY PROCEDURE N/A 1/17/2023    Procedure: Pacemaker DC new;  Surgeon: Baljeet Valero MD;  Location: [facility] CATH INVASIVE LOCATION;  Service: Cardiovascular;  Laterality: N/A;    CHOLECYSTECTOMY      COLONOSCOPY      COLONOSCOPY N/A 7/10/2024    Procedure: COLONOSCOPY with cold snare polypectomy x1 and endoscopic clipping x2;  Surgeon: MARLIN Vanegas MD;  Location: [facility] ENDOSCOPY;  Service: Gastroenterology;  Laterality: N/A;  Post- colon polyps, diverticulosis    ENDOSCOPY N/A 9/14/2023    Procedure: ESOPHAGOGASTRODUODENOSCOPY;  Surgeon: Ulysses Lamas MD;  Location: [facility] ENDOSCOPY;  Service: Gastroenterology;  Laterality: N/A;  gastritis, inflammatory gastric polyp    ENDOSCOPY N/A 7/10/2024    Procedure: ESOPHAGOGASTRODUODENOSCOPY;  Surgeon: MARLIN Vanegas MD;  Location: [facility] ENDOSCOPY;  Service: Gastroenterology;  Laterality: N/A;  Post- hiatal hernia, gastric  "polyps, gastritis    HYSTERECTOMY      ROTATOR CUFF REPAIR Left        Family History   Problem Relation Age of Onset    Cancer Father     Diabetes Son     Cancer Paternal Aunt     Heart disease Paternal Aunt     Cancer Paternal Uncle        Social History     Socioeconomic History    Marital status:    Tobacco Use    Smoking status: Never     Passive exposure: Never    Smokeless tobacco: Never   Vaping Use    Vaping status: Never Used   Substance and Sexual Activity    Alcohol use: No    Drug use: Never    Sexual activity: Defer           Objective   Physical Exam  Vitals and nursing note reviewed.   Constitutional:       General: She is not in acute distress.     Appearance: She is well-developed. She is not ill-appearing, toxic-appearing or diaphoretic.   HENT:      Head: Normocephalic and atraumatic.   Eyes:      Pupils: Pupils are equal, round, and reactive to light.   Cardiovascular:      Rate and Rhythm: Normal rate and regular rhythm.      Heart sounds: Normal heart sounds.   Pulmonary:      Effort: Pulmonary effort is normal.      Breath sounds: Normal breath sounds.   Abdominal:      General: Bowel sounds are normal.      Palpations: Abdomen is soft.   Musculoskeletal:      Cervical back: Normal range of motion and neck supple.   Skin:     General: Skin is warm and dry.   Neurological:      General: No focal deficit present.      Mental Status: She is alert and oriented to person, place, and time.         Procedures           ED Course    /71 (BP Location: Right arm, Patient Position: Lying)   Pulse 70   Temp 97.8 °F (36.6 °C) (Oral)   Resp 20   Ht 162.6 cm (64\")   Wt 69.9 kg (154 lb)   SpO2 94%   BMI 26.43 kg/m²   Labs Reviewed   COMPREHENSIVE METABOLIC PANEL - Abnormal; Notable for the following components:       Result Value    Glucose 111 (*)     All other components within normal limits    Narrative:     GFR Categories in Chronic Kidney Disease (CKD)      GFR Category          GFR " (mL/min/1.73)    Interpretation  G1                     90 or greater         Normal or high (1)  G2                      60-89                Mild decrease (1)  G3a                   45-59                Mild to moderate decrease  G3b                   30-44                Moderate to severe decrease  G4                    15-29                Severe decrease  G5                    14 or less           Kidney failure          (1)In the absence of evidence of kidney disease, neither GFR category G1 or G2 fulfill the criteria for CKD.    eGFR calculation 2021 CKD-EPI creatinine equation, which does not include race as a factor   BNP (IN-HOUSE) - Abnormal; Notable for the following components:    proBNP 2,416.0 (*)     All other components within normal limits    Narrative:     This assay is used as an aid in the diagnosis of individuals suspected of having heart failure. It can be used as an aid in the diagnosis of acute decompensated heart failure (ADHF) in patients presenting with signs and symptoms of ADHF to the emergency department (ED). In addition, NT-proBNP of <300 pg/mL indicates ADHF is not likely.    Age Range Result Interpretation  NT-proBNP Concentration (pg/mL:      <50             Positive            >450                   Gray                 300-450                    Negative             <300    50-75           Positive            >900                  Gray                300-900                  Negative            <300      >75             Positive            >1800                  Gray                300-1800                  Negative            <300   CBC WITH AUTO DIFFERENTIAL - Abnormal; Notable for the following components:    MCV 97.4 (*)     All other components within normal limits   TSH RFX ON ABNORMAL TO FREE T4 - Abnormal; Notable for the following components:    TSH 4.570 (*)     All other components within normal limits   D-DIMER, QUANTITATIVE - Normal    Narrative:     According to  "the assay 's published package insert, a normal (<0.50 MCGFEU/mL) D-dimer result in conjunction with a non-high clinical probability assessment, excludes deep vein thrombosis (DVT) and pulmonary embolism (PE) with high sensitivity.    D-dimer values increase with age and this can make VTE exclusion of an older population difficult. To address this, the [employer], based on best available evidence and recent guidelines, recommends that clinicians use age-adjusted D-dimer thresholds in patients greater than 50 years of age with: a) a low probability of PE who do not meet all Pulmonary Embolism Rule Out Criteria, or b) in those with intermediate probability of PE.   The formula for an age-adjusted D-dimer cut-off is \"age/100\".  For example, a 60 year old patient would have an age-adjusted cut-off of 0.60 MCGFEU/mL and an 80 year old 0.80 MCGFEU/mL.   TROPONIN - Normal    Narrative:     High Sensitive Troponin T Reference Range:  <14.0 ng/L- Negative Female for AMI  <22.0 ng/L- Negative Male for AMI  >=14 - Abnormal Female indicating possible myocardial injury.  >=22 - Abnormal Male indicating possible myocardial injury.   Clinicians would have to utilize clinical acumen, EKG, Troponin, and serial changes to determine if it is an Acute Myocardial Infarction or myocardial injury due to an underlying chronic condition.        HIGH SENSITIVITIY TROPONIN T 1HR - Normal    Narrative:     High Sensitive Troponin T Reference Range:  <14.0 ng/L- Negative Female for AMI  <22.0 ng/L- Negative Male for AMI  >=14 - Abnormal Female indicating possible myocardial injury.  >=22 - Abnormal Male indicating possible myocardial injury.   Clinicians would have to utilize clinical acumen, EKG, Troponin, and serial changes to determine if it is an Acute Myocardial Infarction or myocardial injury due to an underlying chronic condition.        MAGNESIUM - Normal   T4, FREE - Normal   CBC AND DIFFERENTIAL    " Narrative:     The following orders were created for panel order CBC & Differential.  Procedure                               Abnormality         Status                     ---------                               -----------         ------                     CBC Auto Differential[[id number]]        Abnormal            Final result                 Please view results for these tests on the individual orders.   EXTRA TUBES    Narrative:     The following orders were created for panel order Extra Tubes.  Procedure                               Abnormality         Status                     ---------                               -----------         ------                     Gold Top - SST[[id number]]                                   Final result                 Please view results for these tests on the individual orders.   GOLD TOP - SST     Medications   nitroglycerin (NITROSTAT) SL tablet 0.4 mg (has no administration in time range)   Potassium Replacement - Follow Nurse / BPA Driven Protocol (has no administration in time range)   Magnesium Standard Dose Replacement - Follow Nurse / BPA Driven Protocol (has no administration in time range)   Phosphorus Replacement - Follow Nurse / BPA Driven Protocol (has no administration in time range)   Calcium Replacement - Follow Nurse / BPA Driven Protocol (has no administration in time range)   morphine injection 1 mg (has no administration in time range)     And   naloxone (NARCAN) injection 0.4 mg (has no administration in time range)   sennosides-docusate (PERICOLACE) 8.6-50 MG per tablet 2 tablet (has no administration in time range)     And   polyethylene glycol (MIRALAX) packet 17 g (has no administration in time range)     And   bisacodyl (DULCOLAX) EC tablet 5 mg (has no administration in time range)     And   bisacodyl (DULCOLAX) suppository 10 mg (has no administration in time range)   melatonin tablet 5 mg (has no administration in time range)   ondansetron ODT  (ZOFRAN-ODT) disintegrating tablet 4 mg (has no administration in time range)     Or   ondansetron (ZOFRAN) injection 4 mg (has no administration in time range)   atorvastatin (LIPITOR) tablet 20 mg (has no administration in time range)   levothyroxine (SYNTHROID, LEVOTHROID) tablet 50 mcg (has no administration in time range)   dilTIAZem (CARDIZEM) tablet 30 mg (30 mg Oral Given 3/26/25 1257)   metoprolol succinate XL (TOPROL-XL) 24 hr tablet 100 mg (100 mg Oral Given 3/26/25 1258)   pantoprazole (PROTONIX) EC tablet 40 mg (40 mg Oral Given 3/26/25 1257)   spironolactone (ALDACTONE) tablet 25 mg (25 mg Oral Given 3/26/25 1257)   apixaban (ELIQUIS) tablet 5 mg (5 mg Oral Given 3/26/25 1258)   ferrous sulfate tablet 325 mg (325 mg Oral Given 3/26/25 1257)   aspirin chewable tablet 324 mg (324 mg Oral Given 3/26/25 0736)   metoprolol tartrate (LOPRESSOR) injection 5 mg (5 mg Intravenous Given 3/26/25 0805)   metoprolol tartrate (LOPRESSOR) injection 5 mg (5 mg Intravenous Given 3/26/25 0846)   dilTIAZem (CARDIZEM) bolus from bag 1 mg/mL solution 10 mg (10 mg Intravenous Bolus from Bag 3/26/25 1011)     XR Chest 1 View  Result Date: 3/26/2025  Impression: No acute cardiopulmonary abnormality. Electronically Signed: Celia Cummings MD  3/26/2025 8:04 AM EDT  Workstation ID: [id number]                  HEART Score: 6   Shared Decision Making  I discussed the findings with the patient/patient representative who is in agreement with the treatment plan and the final disposition.  Risks and benefits of discharge and/or observation/admission were discussed: Yes                                      Medical Decision Making  84-year-old female presents the emergency room with complaints of tightness around her heart.  Patient states that the symptoms woke her up last night while she was sleeping, resolved at time of initial evaluation.  Physical exam: HEENT is normocephalic and nontraumatic and no signs of JVD.  Lungs clear to  auscultation bilateral heart regular and without murmur abdomen soft nontender nondistended extremities noncyanotic nonedematous.  Vital signs BP is 131/71 temperature 97.8 heart rate is 131 respiration 20 SpO2 on room air is 94%.  ER course: EKG shows heart rate 132 sinus tachycardia.  No ST elevation or depression independently interpreted by Dr. Reagan.  Chest x-ray shows no cardiopulmonary abnormality per radiologist CBC was normal with WBC of 7.47 hemoglobin 13.2 hematocrit 42 1.7 platelets of 202.  proBNP is mildly elevated 2416 troponin initially was 10 D-dimer 0.60 normal CMP glucose 111 BUN 20 creatinine is 0.94 sodium 136 with a potassium of 4.7.  Repeat troponin was 9.  ER course, medications given: Lopressor 5 mg given for rate control no return reduction in right repeat low pressure 5 mg was given with no repeat with no decrease in rate.  Patient was given 10 mg Cardizem bolus with good results heart rate was 70 there was no infusion needed.  Patient remained pain-free and chest tightness very well in the ER due to the tachycardia and complaint of chest pain patient will be admitted to hospitalist in stable condition for further workup.  Patient was admitted in stable condition.  Patient's ER course treatment plan and disposition discussed with Dr. Reagan.  Heart score 6.    Problems Addressed:  Chest pain, unspecified type: complicated acute illness or injury  Tachycardia: complicated acute illness or injury    Amount and/or Complexity of Data Reviewed  Labs: ordered.  Radiology: ordered.    Risk  OTC drugs.  Prescription drug management.  Decision regarding hospitalization.        Final diagnoses:   Chest pain, unspecified type   Tachycardia       ED Disposition  ED Disposition       ED Disposition   Decision to Admit    Condition   --    Comment   Level of Care: Med/Surg [1]   Diagnosis: Chest pain [292937]   Admitting Physician: LIDA MAY [[id number]]   Attending Physician: LIDA MAY  [[id number]]                 No follow-up provider specified.       Medication List      No changes were made to your prescriptions during this visit.            Ulysses Garcia PA-C  03/26/25 [id number]

## 2025-03-26 NOTE — ED NOTES
Nursing report ED to floor  Catalina Moreno  84 y.o.  female    HPI:   Chief Complaint   Patient presents with    Chest Pain       Admitting doctor:   Eddie Mojica MD    Admitting diagnosis:   The primary encounter diagnosis was Chest pain, unspecified type. A diagnosis of Tachycardia was also pertinent to this visit.    Code status:   Current Code Status       Date Active Code Status Order ID Comments User Context       3/26/2025 1115 CPR (Attempt to Resuscitate) [id number]  Tosha Womack PA-C ED        Question Answer    Code Status (Patient has no pulse and is not breathing) CPR (Attempt to Resuscitate)    Medical Interventions (Patient has pulse or is breathing) Full Support                    Allergies:   Baclofen, Codeine, Ibuprofen, Methocarbamol, Naproxen, Tizanidine hcl, and Tolmetin    Isolation:  No active isolations     Fall Risk:  Fall Risk Assessment was completed, and patient is at low risk for falls.   Predictive Model Details         13 (Low) Factor Value    Calculated 3/26/2025 12:08 Age 84    Risk of Fall Model Active Peripheral IV Present     Imaging order in this encounter Present     Magnesium 2.1 mg/dL     Respiratory Rate 18     Number of Distinct Medication Classes administered 3     Rick Scale not on file     Diastolic BP 73     Chloride 100 mmol/L     Total Bilirubin 0.6 mg/dL     Creatinine 0.94 mg/dL     Duration of Current Encounter 0.231 days     Potassium 4.7 mmol/L     Calcium 9.4 mg/dL     Albumin 4.4 g/dL     ALT 9 U/L         Weight:       03/26/25  0729   Weight: 69.9 kg (154 lb)       Intake and Output    Intake/Output Summary (Last 24 hours) at 3/26/2025 1213  Last data filed at 3/26/2025 1015  Gross per 24 hour   Intake 10.33 ml   Output --   Net 10.33 ml       Diet:   Dietary Orders (From admission, onward)       Start     Ordered    03/26/25 1116  Diet: Cardiac; Healthy Heart (2-3 Na+); Fluid Consistency: Thin (IDDSI 0)  Diet Effective Now        References:     Diet Order Definitions   Question Answer Comment   Diets: Cardiac    Cardiac Diet: Healthy Heart (2-3 Na+)    Fluid Consistency: Thin (IDDSI 0)        03/26/25 1115                     Most recent vitals:   Vitals:    03/26/25 1115 03/26/25 1130 03/26/25 1145 03/26/25 1200   BP: 126/72 124/70 130/71 134/80   Patient Position:       Pulse: 70 69 71 70   Resp:    18   Temp:       TempSrc:       SpO2: 95% 94% 94% 94%   Weight:       Height:           Active LDAs/IV Access:   Lines, Drains & Airways       Active LDAs       Name Placement date Placement time Site Days    Peripheral IV 03/26/25 0730 Left Antecubital 03/26/25  0730  Antecubital  less than 1                    Skin Condition:   Skin Assessments (last day)       Date/Time Skin WDL    03/26/25 07:24:37 WDL             Labs (abnormal labs have a star):   Labs Reviewed   COMPREHENSIVE METABOLIC PANEL - Abnormal; Notable for the following components:       Result Value    Glucose 111 (*)     All other components within normal limits    Narrative:     GFR Categories in Chronic Kidney Disease (CKD)      GFR Category          GFR (mL/min/1.73)    Interpretation  G1                     90 or greater         Normal or high (1)  G2                      60-89                Mild decrease (1)  G3a                   45-59                Mild to moderate decrease  G3b                   30-44                Moderate to severe decrease  G4                    15-29                Severe decrease  G5                    14 or less           Kidney failure          (1)In the absence of evidence of kidney disease, neither GFR category G1 or G2 fulfill the criteria for CKD.    eGFR calculation 2021 CKD-EPI creatinine equation, which does not include race as a factor   BNP (IN-HOUSE) - Abnormal; Notable for the following components:    proBNP 2,416.0 (*)     All other components within normal limits    Narrative:     This assay is used as an aid in the diagnosis of individuals  "suspected of having heart failure. It can be used as an aid in the diagnosis of acute decompensated heart failure (ADHF) in patients presenting with signs and symptoms of ADHF to the emergency department (ED). In addition, NT-proBNP of <300 pg/mL indicates ADHF is not likely.    Age Range Result Interpretation  NT-proBNP Concentration (pg/mL:      <50             Positive            >450                   Gray                 300-450                    Negative             <300    50-75           Positive            >900                  Gray                300-900                  Negative            <300      >75             Positive            >1800                  Gray                300-1800                  Negative            <300   CBC WITH AUTO DIFFERENTIAL - Abnormal; Notable for the following components:    MCV 97.4 (*)     All other components within normal limits   D-DIMER, QUANTITATIVE - Normal    Narrative:     According to the assay 's published package insert, a normal (<0.50 MCGFEU/mL) D-dimer result in conjunction with a non-high clinical probability assessment, excludes deep vein thrombosis (DVT) and pulmonary embolism (PE) with high sensitivity.    D-dimer values increase with age and this can make VTE exclusion of an older population difficult. To address this, the [employer], based on best available evidence and recent guidelines, recommends that clinicians use age-adjusted D-dimer thresholds in patients greater than 50 years of age with: a) a low probability of PE who do not meet all Pulmonary Embolism Rule Out Criteria, or b) in those with intermediate probability of PE.   The formula for an age-adjusted D-dimer cut-off is \"age/100\".  For example, a 60 year old patient would have an age-adjusted cut-off of 0.60 MCGFEU/mL and an 80 year old 0.80 MCGFEU/mL.   TROPONIN - Normal    Narrative:     High Sensitive Troponin T Reference Range:  <14.0 ng/L- Negative " Female for AMI  <22.0 ng/L- Negative Male for AMI  >=14 - Abnormal Female indicating possible myocardial injury.  >=22 - Abnormal Male indicating possible myocardial injury.   Clinicians would have to utilize clinical acumen, EKG, Troponin, and serial changes to determine if it is an Acute Myocardial Infarction or myocardial injury due to an underlying chronic condition.        HIGH SENSITIVITIY TROPONIN T 1HR - Normal    Narrative:     High Sensitive Troponin T Reference Range:  <14.0 ng/L- Negative Female for AMI  <22.0 ng/L- Negative Male for AMI  >=14 - Abnormal Female indicating possible myocardial injury.  >=22 - Abnormal Male indicating possible myocardial injury.   Clinicians would have to utilize clinical acumen, EKG, Troponin, and serial changes to determine if it is an Acute Myocardial Infarction or myocardial injury due to an underlying chronic condition.        MAGNESIUM - Normal   TSH RFX ON ABNORMAL TO FREE T4   CBC AND DIFFERENTIAL    Narrative:     The following orders were created for panel order CBC & Differential.  Procedure                               Abnormality         Status                     ---------                               -----------         ------                     CBC Auto Differential[[id number]]        Abnormal            Final result                 Please view results for these tests on the individual orders.   EXTRA TUBES    Narrative:     The following orders were created for panel order Extra Tubes.  Procedure                               Abnormality         Status                     ---------                               -----------         ------                     Gold Top - SST[[id number]]                                   Final result                 Please view results for these tests on the individual orders.   GOLD TOP - [facility]       LOC: Person, Place, Time, and Situation    Telemetry:  Med/Surg    Cardiac Monitoring Ordered: yes    EKG:   ECG 12 Lead Rhythm  Change   Preliminary Result   HEART RATE=70  bpm   RR Pfscjvfe=239  ms   OH Interval=  ms   P Horizontal Axis=  deg   P Front Axis=  deg   QRSD Interval=79  ms   QT Lsdrjqpg=649  ms   GQtK=156  ms   QRS Axis=53  deg   T Wave Axis=32  deg   - ABNORMAL ECG -   Afib/flut and V-paced complexes   Nonspecific T abnormalities, anterior leads   Date and Time of Study:2025-03-26 12:01:44      ECG 12 Lead Tachycardia   Preliminary Result   HEART XTHL=556  bpm   RR Utbvfkny=047  ms   OH Eyxmxmdd=915  ms   P Horizontal Axis=125  deg   P Front Axis=246  deg   QRSD Interval=75  ms   QT Nlrxmcpm=839  ms   QKvG=387  ms   QRS Axis=52  deg   T Wave Axis=8  deg   - ABNORMAL ECG -   Sinus or ectopic atrial tachycardia   Repolarization abnormality, prob rate related   When compared with ECG of 10-Mar-2025 00:13:26,   Significant change in rhythm   Date and Time of Study:2025-03-26 06:44:37      Telemetry Scan   Final Result          Medications Given in the ED:   Medications   nitroglycerin (NITROSTAT) SL tablet 0.4 mg (has no administration in time range)   Potassium Replacement - Follow Nurse / BPA Driven Protocol (has no administration in time range)   Magnesium Standard Dose Replacement - Follow Nurse / BPA Driven Protocol (has no administration in time range)   Phosphorus Replacement - Follow Nurse / BPA Driven Protocol (has no administration in time range)   Calcium Replacement - Follow Nurse / BPA Driven Protocol (has no administration in time range)   morphine injection 1 mg (has no administration in time range)     And   naloxone (NARCAN) injection 0.4 mg (has no administration in time range)   sennosides-docusate (PERICOLACE) 8.6-50 MG per tablet 2 tablet (has no administration in time range)     And   polyethylene glycol (MIRALAX) packet 17 g (has no administration in time range)     And   bisacodyl (DULCOLAX) EC tablet 5 mg (has no administration in time range)     And   bisacodyl (DULCOLAX) suppository 10 mg (has no  administration in time range)   melatonin tablet 5 mg (has no administration in time range)   ondansetron ODT (ZOFRAN-ODT) disintegrating tablet 4 mg (has no administration in time range)     Or   ondansetron (ZOFRAN) injection 4 mg (has no administration in time range)   atorvastatin (LIPITOR) tablet 20 mg (has no administration in time range)   ferrous sulfate tablet 324 mg (has no administration in time range)   levothyroxine (SYNTHROID, LEVOTHROID) tablet 50 mcg (has no administration in time range)   dilTIAZem (CARDIZEM) tablet 30 mg (has no administration in time range)   metoprolol succinate XL (TOPROL-XL) 24 hr tablet 100 mg (has no administration in time range)   pantoprazole (PROTONIX) EC tablet 40 mg (has no administration in time range)   spironolactone (ALDACTONE) tablet 25 mg (has no administration in time range)   apixaban (ELIQUIS) tablet 5 mg (has no administration in time range)   aspirin chewable tablet 324 mg (324 mg Oral Given 3/26/25 0736)   metoprolol tartrate (LOPRESSOR) injection 5 mg (5 mg Intravenous Given 3/26/25 0805)   metoprolol tartrate (LOPRESSOR) injection 5 mg (5 mg Intravenous Given 3/26/25 0846)   dilTIAZem (CARDIZEM) bolus from bag 1 mg/mL solution 10 mg (10 mg Intravenous Bolus from Bag 3/26/25 1011)       Imaging results:  XR Chest 1 View  Result Date: 3/26/2025  Impression: No acute cardiopulmonary abnormality. Electronically Signed: Celia Cummings MD  3/26/2025 8:04 AM EDT  Workstation ID: [id number]      Social issues:   Social History     Socioeconomic History    Marital status:    Tobacco Use    Smoking status: Never     Passive exposure: Never    Smokeless tobacco: Never   Vaping Use    Vaping status: Never Used   Substance and Sexual Activity    Alcohol use: No    Drug use: Never    Sexual activity: Defer       NIH Stroke Scale:  Interval: (not recorded)  1a. Level of Consciousness: (not recorded)  1b. LOC Questions: (not recorded)  1c. LOC Commands: (not  recorded)  2. Best Gaze: (not recorded)  3. Visual: (not recorded)  4. Facial Palsy: (not recorded)  5a. Motor Arm, Left: (not recorded)  5b. Motor Arm, Right: (not recorded)  6a. Motor Leg, Left: (not recorded)  6b. Motor Leg, Right: (not recorded)  7. Limb Ataxia: (not recorded)  8. Sensory: (not recorded)  9. Best Language: (not recorded)  10. Dysarthria: (not recorded)  11. Extinction and Inattention (formerly Neglect): (not recorded)    Total (NIH Stroke Scale): (not recorded)     Additional notable assessment information:.     Nursing report ED to floor:  Megha Robles RN   03/26/25 12:13 EDT

## 2025-03-26 NOTE — H&P
"[facility] Medicine Services  History & Physical    Patient Name: Catalina Moreno  : 1940  MRN: [id number]  Primary Care Physician:  Liv Duron APRN  Date of admission: 3/26/2025  Date and Time of Service: 3/26/2025 at 1116    Subjective      Chief Complaint: palpitations    History of Present Illness: Catalina Moreno is a 84 y.o. female with a CMH of PAF (on Eliquis), CAD, HFrEF, VHD, SSS s/p PPM, HTN, HLD, hypothyroidism who presented to [facility] on 3/26/2025 with palpitations.  Patient reports suddenly be awakened this morning with palpitations and right-sided chest pressure with associated nausea and shortness of breath.  Denies associated diaphoresis, dizziness, lightheadedness.  Patient states she has been compliant with medications at home but states has not seen cardiologist \"in a while\".  Patient states that since being in the ER symptoms have completely resolved and is inquiring about discharge.    In ED, patient was noted to be tachycardic.  EKG was consistent with A-fib with RVR.  Patient was given Lopressor x 2 and then diltiazem bolus and then started on diltiazem drip.  CMP and CBC was relatively unremarkable.  Troponin trend remained flat, initial 10, repeat 9.  BNP was elevated to 2416.  D-dimer negative at 0.6.  CXR with no acute findings.  Hospital service to admit for further evaluation of chest pain.    Review of Systems   Constitutional:  Negative for chills and fever.   Respiratory:  Positive for shortness of breath.    Cardiovascular:  Positive for chest pain and palpitations.   Gastrointestinal:  Negative for abdominal pain, nausea and vomiting.   All other systems reviewed and are negative.      Personal History     Past Medical History:   Diagnosis Date    Acquired spondylolisthesis of lumbosacral region     Acute kidney injury 2022    Anxiety     Arthritis     Atrial fibrillation     paroxysmal    Broken shoulder     left-- due to fall 19 " was at [facility]    Chronic pain disorder     Chronic systolic CHF (congestive heart failure) 01/05/2023    EF 36-40%    Coronary artery disease involving native coronary artery of native heart without angina pectoris 04/02/2021    nonobstructive    DDD (degenerative disc disease), cervical     DDD (degenerative disc disease), lumbar     Depression     Edema     9/2020 foot    GERD (gastroesophageal reflux disease)     H/O fall     Heart failure with mid-range ejection fraction 03/05/2022    EF 45% per 2D echo    Hypertension     Hypothyroidism     Insomnia     Low back pain     Moderate mitral regurgitation 01/05/2023    Neuropathy     Nonrheumatic tricuspid valve regurgitation     Pain in both feet     Polypharmacy     PONV (postoperative nausea and vomiting)     Pulmonary hypertension     Radiculopathy     Restless legs     Sacroiliitis     Sick sinus syndrome     Added automatically from request for surgery [id number]    Spondylolisthesis     Stage 3a chronic kidney disease     Urinary incontinence     Vitamin D deficiency        Past Surgical History:   Procedure Laterality Date    BACK SURGERY  07/19/2018    PDC &  PSF L3-L5 insitu    CARDIAC CATHETERIZATION N/A 4/2/2021    Procedure: Cardiac Catheterization/Vascular Study;  Surgeon: Barrett Evans MD;  Location: [facility] CATH INVASIVE LOCATION;  Service: Cardiovascular;  Laterality: N/A;    CARDIAC ELECTROPHYSIOLOGY PROCEDURE N/A 1/17/2023    Procedure: Pacemaker DC new;  Surgeon: Baljeet Valero MD;  Location: [facility] CATH INVASIVE LOCATION;  Service: Cardiovascular;  Laterality: N/A;    CHOLECYSTECTOMY      COLONOSCOPY      COLONOSCOPY N/A 7/10/2024    Procedure: COLONOSCOPY with cold snare polypectomy x1 and endoscopic clipping x2;  Surgeon: MARLIN Vanegas MD;  Location: [facility] ENDOSCOPY;  Service: Gastroenterology;  Laterality: N/A;  Post- colon polyps, diverticulosis    ENDOSCOPY N/A 9/14/2023    Procedure: ESOPHAGOGASTRODUODENOSCOPY;  Surgeon:  Ulysses Lamas MD;  Location: [facility] ENDOSCOPY;  Service: Gastroenterology;  Laterality: N/A;  gastritis, inflammatory gastric polyp    ENDOSCOPY N/A 7/10/2024    Procedure: ESOPHAGOGASTRODUODENOSCOPY;  Surgeon: MARLIN Vanegas MD;  Location: [facility] ENDOSCOPY;  Service: Gastroenterology;  Laterality: N/A;  Post- hiatal hernia, gastric polyps, gastritis    HYSTERECTOMY      ROTATOR CUFF REPAIR Left        Family History: family history includes Cancer in her father, paternal aunt, and paternal uncle; Diabetes in her son; Heart disease in her paternal aunt. Otherwise pertinent FHx was reviewed and not pertinent to current issue.    Social History:  reports that she has never smoked. She has never been exposed to tobacco smoke. She has never used smokeless tobacco. She reports that she does not drink alcohol and does not use drugs.    Home Medications:  Prior to Admission Medications       Prescriptions Last Dose Informant Patient Reported? Taking?    apixaban (Eliquis) 5 MG tablet tablet   No Yes    TAKE 1 TABLET BY MOUTH EVERY 12 HOURS FOR ATRIAL FIBRILLATION    atorvastatin (Lipitor) 20 MG tablet   No Yes    Take 1 tablet by mouth Daily for 90 days.    dilTIAZem (CARDIZEM) 30 MG tablet   Yes Yes    Take 1 tablet by mouth 2 (Two) Times a Day.    ferrous gluconate (FERGON) 324 MG tablet   No Yes    Take 1 tablet by mouth Every Other Day.    levothyroxine (SYNTHROID, LEVOTHROID) 50 MCG tablet   No Yes    Take 1 tablet by mouth Daily.    Magnesium 250 MG capsule   Yes Yes    Take 1 capsule by mouth Daily.    metoprolol succinate XL (TOPROL-XL) 100 MG 24 hr tablet   No Yes    Take 1 tablet by mouth Daily.    pantoprazole (PROTONIX) 40 MG EC tablet   No Yes    TAKE 1 TABLET BY MOUTH EVERY DAY    Vitamin D, Cholecalciferol, (CHOLECALCIFEROL) 10 MCG (400 UNIT) tablet   Yes Yes    Take 1 tablet by mouth Daily.    spironolactone (ALDACTONE) 25 MG tablet   Yes No    Take 1 tablet by mouth Daily.               Allergies:  Allergies   Allergen Reactions    Baclofen Rash    Codeine Nausea Only    Ibuprofen Unknown (See Comments)     Patient doesn't know----Motin    Methocarbamol Unknown (See Comments)     Patient doesn't know    Naproxen Unknown (See Comments)     Pt. Doesn't know     Tizanidine Hcl Other (See Comments)     Syncope     Tolmetin Dizziness     Pt. Doesn't know-- same as Tolectin       Objective      Vitals:   Temp:  [98.1 °F (36.7 °C)] 98.1 °F (36.7 °C)  Heart Rate:  [] 70  Resp:  [18-20] 18  BP: (101-186)/() 125/73  Body mass index is 26.43 kg/m².    Physical Exam  General: 85 yo WF, Alert and oriented, well nourished, no acute distress.  HENT: Normocephalic, normal hearing, moist oral mucosa, no scleral icterus.  Neck: Supple, nontender, no carotid bruits, no JVD, no LAD.  Lungs: Clear to auscultation, nonlabored respiration.  Heart: RRR, no murmur, gallop or edema.  Abdomen: Soft, nontender, nondistended, + bowel sounds.  Musculoskeletal: Normal range of motion and strength, no tenderness or swelling.  Skin: Skin is warm, dry and pink, no rashes or lesions.  Psychiatric: Cooperative, appropriate mood and affect.      Diagnostic Data:  Lab Results (last 24 hours)       Procedure Component Value Units Date/Time    Magnesium [[id number]] Collected: 03/26/25 0845    Specimen: Blood Updated: 03/26/25 1131    High Sensitivity Troponin T 1Hr [[id number]]  (Normal) Collected: 03/26/25 0845    Specimen: Blood Updated: 03/26/25 0919     HS Troponin T 9 ng/L      Troponin T Numeric Delta -1 ng/L     Narrative:      High Sensitive Troponin T Reference Range:  <14.0 ng/L- Negative Female for AMI  <22.0 ng/L- Negative Male for AMI  >=14 - Abnormal Female indicating possible myocardial injury.  >=22 - Abnormal Male indicating possible myocardial injury.   Clinicians would have to utilize clinical acumen, EKG, Troponin, and serial changes to determine if it is an Acute Myocardial Infarction or myocardial  injury due to an underlying chronic condition.         High Sensitivity Troponin T [[id number]]  (Normal) Collected: 03/26/25 0724    Specimen: Blood from Arm, Left Updated: 03/26/25 0803     HS Troponin T 10 ng/L     Narrative:      High Sensitive Troponin T Reference Range:  <14.0 ng/L- Negative Female for AMI  <22.0 ng/L- Negative Male for AMI  >=14 - Abnormal Female indicating possible myocardial injury.  >=22 - Abnormal Male indicating possible myocardial injury.   Clinicians would have to utilize clinical acumen, EKG, Troponin, and serial changes to determine if it is an Acute Myocardial Infarction or myocardial injury due to an underlying chronic condition.         BNP [[id number]]  (Abnormal) Collected: 03/26/25 0724    Specimen: Blood from Arm, Left Updated: 03/26/25 0803     proBNP 2,416.0 pg/mL     Narrative:      This assay is used as an aid in the diagnosis of individuals suspected of having heart failure. It can be used as an aid in the diagnosis of acute decompensated heart failure (ADHF) in patients presenting with signs and symptoms of ADHF to the emergency department (ED). In addition, NT-proBNP of <300 pg/mL indicates ADHF is not likely.    Age Range Result Interpretation  NT-proBNP Concentration (pg/mL:      <50             Positive            >450                   Gray                 300-450                    Negative             <300    50-75           Positive            >900                  Gray                300-900                  Negative            <300      >75             Positive            >1800                  Gray                300-1800                  Negative            <300    Comprehensive Metabolic Panel [[id number]]  (Abnormal) Collected: 03/26/25 0724    Specimen: Blood from Arm, Left Updated: 03/26/25 0803     Glucose 111 mg/dL      BUN 20 mg/dL      Creatinine 0.94 mg/dL      Sodium 136 mmol/L      Potassium 4.7 mmol/L      Chloride 100 mmol/L      CO2 24.9 mmol/L       Calcium 9.4 mg/dL      Total Protein 7.4 g/dL      Albumin 4.4 g/dL      ALT (SGPT) 9 U/L      AST (SGOT) 18 U/L      Alkaline Phosphatase 72 U/L      Total Bilirubin 0.6 mg/dL      Globulin 3.0 gm/dL      A/G Ratio 1.5 g/dL      BUN/Creatinine Ratio 21.3     Anion Gap 11.1 mmol/L      eGFR 60.3 mL/min/1.73     Narrative:      GFR Categories in Chronic Kidney Disease (CKD)      GFR Category          GFR (mL/min/1.73)    Interpretation  G1                     90 or greater         Normal or high (1)  G2                      60-89                Mild decrease (1)  G3a                   45-59                Mild to moderate decrease  G3b                   30-44                Moderate to severe decrease  G4                    15-29                Severe decrease  G5                    14 or less           Kidney failure          (1)In the absence of evidence of kidney disease, neither GFR category G1 or G2 fulfill the criteria for CKD.    eGFR calculation 2021 CKD-EPI creatinine equation, which does not include race as a factor    D-dimer, Quantitative [[id number]]  (Normal) Collected: 03/26/25 0724    Specimen: Blood from Arm, Left Updated: 03/26/25 0740     D-Dimer, Quantitative 0.60 MCGFEU/mL     Narrative:      According to the assay 's published package insert, a normal (<0.50 MCGFEU/mL) D-dimer result in conjunction with a non-high clinical probability assessment, excludes deep vein thrombosis (DVT) and pulmonary embolism (PE) with high sensitivity.    D-dimer values increase with age and this can make VTE exclusion of an older population difficult. To address this, the American College [employer], based on best available evidence and recent guidelines, recommends that clinicians use age-adjusted D-dimer thresholds in patients greater than 50 years of age with: a) a low probability of PE who do not meet all Pulmonary Embolism Rule Out Criteria, or b) in those with intermediate probability of PE.  "  The formula for an age-adjusted D-dimer cut-off is \"age/100\".  For example, a 60 year old patient would have an age-adjusted cut-off of 0.60 MCGFEU/mL and an 80 year old 0.80 MCGFEU/mL.    Extra Tubes [[id number]] Collected: 03/26/25 0724    Specimen: Blood from Arm, Left Updated: 03/26/25 0731    Narrative:      The following orders were created for panel order Extra Tubes.  Procedure                               Abnormality         Status                     ---------                               -----------         ------                     Gold Top - SST[[id number]]                                   Final result                 Please view results for these tests on the individual orders.    Gold Top - SST [[id number]] Collected: 03/26/25 0724    Specimen: Blood from Arm, Left Updated: 03/26/25 0731     Extra Tube Hold for add-ons.     Comment: Auto resulted.       CBC & Differential [[id number]]  (Abnormal) Collected: 03/26/25 0724    Specimen: Blood from Arm, Left Updated: 03/26/25 0730    Narrative:      The following orders were created for panel order CBC & Differential.  Procedure                               Abnormality         Status                     ---------                               -----------         ------                     CBC Auto Differential[[id number]]        Abnormal            Final result                 Please view results for these tests on the individual orders.    CBC Auto Differential [[id number]]  (Abnormal) Collected: 03/26/25 0724    Specimen: Blood from Arm, Left Updated: 03/26/25 0730     WBC 7.47 10*3/mm3      RBC 4.28 10*6/mm3      Hemoglobin 13.2 g/dL      Hematocrit 41.7 %      MCV 97.4 fL      MCH 30.8 pg      MCHC 31.7 g/dL      RDW 14.5 %      RDW-SD 52.0 fl      MPV 9.5 fL      Platelets 202 10*3/mm3      Neutrophil % 65.3 %      Lymphocyte % 25.2 %      Monocyte % 6.0 %      Eosinophil % 2.3 %      Basophil % 0.9 %      Immature Grans % 0.3 %      Neutrophils, " Absolute 4.88 10*3/mm3      Lymphocytes, Absolute 1.88 10*3/mm3      Monocytes, Absolute 0.45 10*3/mm3      Eosinophils, Absolute 0.17 10*3/mm3      Basophils, Absolute 0.07 10*3/mm3      Immature Grans, Absolute 0.02 10*3/mm3      nRBC 0.0 /100 WBC              Imaging Results (Last 24 Hours)       Procedure Component Value Units Date/Time    XR Chest 1 View [[id number]] Collected: 03/26/25 0752     Updated: 03/26/25 0806    Narrative:      XR CHEST 1 VW    Date of Exam: 3/26/2025 7:28 AM EDT    Indication: Chest pain    Comparison: 3/10/2025    Findings:  Cardiomediastinal silhouette is prominent, stable. There is a left-sided AICD. No airspace disease, pneumothorax, nor pleural effusion. No acute osseous abnormality identified.      Impression:      Impression:  No acute cardiopulmonary abnormality.      Electronically Signed: Celia Cummings MD    3/26/2025 8:04 AM EDT    Workstation ID: [id number]              Assessment & Plan        This is a 84 y.o. female with:    Active and Resolved Problems  Active Hospital Problems    Diagnosis  POA    **Atrial fibrillation with RVR [I48.91]  Yes      Resolved Hospital Problems   No resolved problems to display.       Palpitations  Atrial fibrillation with RVR  S/p heart cath 3/10/2025 with no evidence of ischemia  CXR-no acute findings  EKG was A-fib with RVR with a rate of 132  Repeat EKG pending  Troponin 10, repeat 9  TSH, Mg pending   Given ASA, Lopressor x 2 and diltiazem bolus in ED  Continue diltiazem drip  Resume home diltiazem and metoprolol, wean off diltiazem drip as able  Resume Eliquis  Telemetry  Cardiology consulted    Chronic systolic congestive heart failure  Sick sinus syndrome s/p PPM  Valvular heart disease  Hypertension  Hyperlipidemia  BNP 2460  1/29/2025 TTE with EF 51 to 55%  Continue statin, spironolactone  Cardiac diet, Sodium < 2g    Hypothyroidism-continue levothyroxine  Anxiety/depression-resume home medications  GERD-continue PPI      VTE  Prophylaxis:  Pharmacologic VTE prophylaxis orders are present.        The patient desires to be as follows:    CODE STATUS:    Code Status (Patient has no pulse and is not breathing): CPR (Attempt to Resuscitate)  Medical Interventions (Patient has pulse or is breathing): Full Support        Shwetha Eldridge, who can be contacted at [phone], is the designated person to make medical decisions on the patient's behalf if She is incapable of doing so. This was clarified with patient and/or next of kin on 3/26/2025 during the course of this H&P.    Admission Status:  I believe this patient meets observation status.    Expected Length of Stay: 1-2 days    PDMP and Medication Dispenses via Sidebar reviewed and consistent with patient reported medications.    I discussed the patient's findings and my recommendations with patient.      Signature:     This document has been electronically signed by Tosha Womack PA-C on March 26, 2025 11:44 EDT   [facility] Hospitalist Team

## 2025-03-26 NOTE — Clinical Note
Level of Care: Telemetry [5]   Admitting Physician: LIDA MAY [[id number]]   Attending Physician: LIDA MAY [[id number]]

## 2025-03-26 NOTE — CASE MANAGEMENT/SOCIAL WORK
Discharge Planning Assessment   Gabriel     Patient Name: Catalina Moreno  MRN: [id number]  Today's Date: 3/26/2025    Admit Date: 3/26/2025    Plan: Home   Discharge Needs Assessment       Row Name 03/26/25 1225       Living Environment    People in Home alone    Unique Family Situation Lives in one story house and has a ramp in the garage and one step at the front entrance into the home.    Current Living Arrangements home    Potentially Unsafe Housing Conditions none    In the past 12 months has the electric, gas, oil, or water company threatened to shut off services in your home? No    Primary Care Provided by self    Provides Primary Care For no one    Family Caregiver if Needed sibling(s);other (see comments)    Family Caregiver Names Neighbor-Jazz Dinero or Sister bing Mendoza.    Quality of Family Relationships helpful;involved;supportive    Able to Return to Prior Arrangements yes       Resource/Environmental Concerns    Resource/Environmental Concerns none    Transportation Concerns none       Transportation Needs    In the past 12 months, has lack of transportation kept you from medical appointments or from getting medications? no    In the past 12 months, has lack of transportation kept you from meetings, work, or from getting things needed for daily living? No       Food Insecurity    Within the past 12 months, you worried that your food would run out before you got the money to buy more. Never true    Within the past 12 months, the food you bought just didn't last and you didn't have money to get more. Never true       Transition Planning    Patient/Family Anticipates Transition to home    Patient/Family Anticipated Services at Transition none    Transportation Anticipated car, drives self;family or friend will provide       Discharge Needs Assessment    Equipment Currently Used at Home rollator;cane, straight;bp cuff;shower chair;grab bar    Concerns to be Addressed discharge planning     Anticipated Changes Related to Illness none    Equipment Needed After Discharge none                Discharge Plan       Row Name 03/26/25 1232       Plan    Plan Home    Plan Comments CM met with patient at the bedside to review discharge planning. Patient lives at at home alone.  Pt is IADLs.  Sister bing or Neighbor Alondra to transport patient at d/c. Confirmed PCP, insurance, and pharmacy. Patient declines M2B and updated in EPIC. Patient denies any difficulty affording food, utilities, or medications. Patient is not current with any HHC/PT services. DC Barriers: Monitor labs, telemetry and [facility] consult.              Demographic Summary       Row Name 03/26/25 1209       General Information    Admission Type observation    Arrived From home    Required Notices Provided Observation Status Notice    Referral Source admission list    Reason for Consult discharge planning    Preferred Language English       Contact Information    Permission Granted to Share Info With               Functional Status       Row Name 03/26/25 1210       Functional Status    Usual Activity Tolerance moderate    Current Activity Tolerance poor       Physical Activity    On average, how many days per week do you engage in moderate to strenuous exercise (like a brisk walk)? 0 days    On average, how many minutes do you engage in exercise at this level? 0 min    Number of minutes of exercise per week 0       Functional Status, IADL    Medications independent    Meal Preparation independent    Housekeeping independent    Laundry independent    Shopping assistive equipment and person  neighbor assists if needed.    If for any reason you need help with day-to-day activities such as bathing, preparing meals, shopping, managing finances, etc., do you get the help you need? I don't need any help     Doreen Sanders RN    [address] [address]  Phone: [phone]  Fax:  [phone]

## 2025-03-27 ENCOUNTER — READMISSION MANAGEMENT (OUTPATIENT)
Dept: CALL CENTER | Facility: HOSPITAL | Age: 85
End: 2025-03-27
Payer: MEDICARE

## 2025-03-27 VITALS
WEIGHT: 154 LBS | HEART RATE: 70 BPM | BODY MASS INDEX: 26.29 KG/M2 | TEMPERATURE: 97.1 F | SYSTOLIC BLOOD PRESSURE: 103 MMHG | OXYGEN SATURATION: 92 % | HEIGHT: 64 IN | RESPIRATION RATE: 14 BRPM | DIASTOLIC BLOOD PRESSURE: 42 MMHG

## 2025-03-27 LAB
ANION GAP SERPL CALCULATED.3IONS-SCNC: 12.1 MMOL/L (ref 5–15)
BASOPHILS # BLD AUTO: 0.09 10*3/MM3 (ref 0–0.2)
BASOPHILS NFR BLD AUTO: 1.2 % (ref 0–1.5)
BUN SERPL-MCNC: 18 MG/DL (ref 8–23)
BUN/CREAT SERPL: 18 (ref 7–25)
CALCIUM SPEC-SCNC: 9.4 MG/DL (ref 8.6–10.5)
CHLORIDE SERPL-SCNC: 102 MMOL/L (ref 98–107)
CO2 SERPL-SCNC: 22.9 MMOL/L (ref 22–29)
CREAT SERPL-MCNC: 1 MG/DL (ref 0.57–1)
DEPRECATED RDW RBC AUTO: 53.1 FL (ref 37–54)
EGFRCR SERPLBLD CKD-EPI 2021: 56 ML/MIN/1.73
EOSINOPHIL # BLD AUTO: 0.14 10*3/MM3 (ref 0–0.4)
EOSINOPHIL NFR BLD AUTO: 1.9 % (ref 0.3–6.2)
ERYTHROCYTE [DISTWIDTH] IN BLOOD BY AUTOMATED COUNT: 14.6 % (ref 12.3–15.4)
GLUCOSE SERPL-MCNC: 100 MG/DL (ref 65–99)
HCT VFR BLD AUTO: 43.1 % (ref 34–46.6)
HGB BLD-MCNC: 13.5 G/DL (ref 12–15.9)
IMM GRANULOCYTES # BLD AUTO: 0.03 10*3/MM3 (ref 0–0.05)
IMM GRANULOCYTES NFR BLD AUTO: 0.4 % (ref 0–0.5)
LYMPHOCYTES # BLD AUTO: 2.36 10*3/MM3 (ref 0.7–3.1)
LYMPHOCYTES NFR BLD AUTO: 32.3 % (ref 19.6–45.3)
MAGNESIUM SERPL-MCNC: 2.2 MG/DL (ref 1.6–2.4)
MCH RBC QN AUTO: 30.9 PG (ref 26.6–33)
MCHC RBC AUTO-ENTMCNC: 31.3 G/DL (ref 31.5–35.7)
MCV RBC AUTO: 98.6 FL (ref 79–97)
MONOCYTES # BLD AUTO: 0.45 10*3/MM3 (ref 0.1–0.9)
MONOCYTES NFR BLD AUTO: 6.2 % (ref 5–12)
NEUTROPHILS NFR BLD AUTO: 4.24 10*3/MM3 (ref 1.7–7)
NEUTROPHILS NFR BLD AUTO: 58 % (ref 42.7–76)
NRBC BLD AUTO-RTO: 0 /100 WBC (ref 0–0.2)
PLATELET # BLD AUTO: 216 10*3/MM3 (ref 140–450)
PMV BLD AUTO: 9.7 FL (ref 6–12)
POTASSIUM SERPL-SCNC: 4.6 MMOL/L (ref 3.5–5.2)
QT INTERVAL: 309 MS
QTC INTERVAL: 458 MS
RBC # BLD AUTO: 4.37 10*6/MM3 (ref 3.77–5.28)
SODIUM SERPL-SCNC: 137 MMOL/L (ref 136–145)
WBC NRBC COR # BLD AUTO: 7.31 10*3/MM3 (ref 3.4–10.8)

## 2025-03-27 PROCEDURE — 25010000002 MORPHINE PER 10 MG

## 2025-03-27 PROCEDURE — 83735 ASSAY OF MAGNESIUM: CPT

## 2025-03-27 PROCEDURE — 97162 PT EVAL MOD COMPLEX 30 MIN: CPT

## 2025-03-27 PROCEDURE — 99214 OFFICE O/P EST MOD 30 MIN: CPT | Performed by: INTERNAL MEDICINE

## 2025-03-27 PROCEDURE — G0378 HOSPITAL OBSERVATION PER HR: HCPCS

## 2025-03-27 PROCEDURE — 85025 COMPLETE CBC W/AUTO DIFF WBC: CPT

## 2025-03-27 PROCEDURE — 80048 BASIC METABOLIC PNL TOTAL CA: CPT

## 2025-03-27 PROCEDURE — 96376 TX/PRO/DX INJ SAME DRUG ADON: CPT

## 2025-03-27 RX ORDER — GABAPENTIN 100 MG/1
100 CAPSULE ORAL ONCE
Status: COMPLETED | OUTPATIENT
Start: 2025-03-27 | End: 2025-03-27

## 2025-03-27 RX ORDER — DILTIAZEM HYDROCHLORIDE 30 MG/1
30 TABLET, FILM COATED ORAL EVERY 8 HOURS SCHEDULED
Qty: 90 TABLET | Refills: 3 | Status: SHIPPED | OUTPATIENT
Start: 2025-03-27

## 2025-03-27 RX ORDER — GABAPENTIN 100 MG/1
100 CAPSULE ORAL NIGHTLY
Qty: 90 CAPSULE | Refills: 1 | Status: SHIPPED | OUTPATIENT
Start: 2025-03-27

## 2025-03-27 RX ADMIN — PANTOPRAZOLE SODIUM 40 MG: 40 TABLET, DELAYED RELEASE ORAL at 10:07

## 2025-03-27 RX ADMIN — SPIRONOLACTONE 25 MG: 25 TABLET ORAL at 10:06

## 2025-03-27 RX ADMIN — MORPHINE SULFATE 1 MG: 2 INJECTION, SOLUTION INTRAMUSCULAR; INTRAVENOUS at 00:57

## 2025-03-27 RX ADMIN — MORPHINE SULFATE 1 MG: 2 INJECTION, SOLUTION INTRAMUSCULAR; INTRAVENOUS at 07:59

## 2025-03-27 RX ADMIN — DILTIAZEM HYDROCHLORIDE 30 MG: 30 TABLET, FILM COATED ORAL at 05:58

## 2025-03-27 RX ADMIN — GABAPENTIN 100 MG: 100 CAPSULE ORAL at 02:55

## 2025-03-27 RX ADMIN — METOPROLOL SUCCINATE 100 MG: 50 TABLET, EXTENDED RELEASE ORAL at 10:07

## 2025-03-27 RX ADMIN — DILTIAZEM HYDROCHLORIDE 30 MG: 30 TABLET, FILM COATED ORAL at 13:26

## 2025-03-27 RX ADMIN — LEVOTHYROXINE SODIUM 50 MCG: 50 TABLET ORAL at 05:58

## 2025-03-27 RX ADMIN — APIXABAN 5 MG: 5 TABLET, FILM COATED ORAL at 10:07

## 2025-03-27 NOTE — THERAPY EVALUATION
Patient Name: Catalina Moreno  : 1940    MRN: [id number]                              Today's Date: 3/27/2025       Admit Date: 3/26/2025    Visit Dx:     ICD-10-CM ICD-9-CM   1. Chest pain, unspecified type  R07.9 786.50   2. Tachycardia  R00.0 785.0     Patient Active Problem List   Diagnosis    Arthritis    Depressive disorder    Primary fibromyalgia syndrome    Hypothyroidism    Atrial fibrillation with RVR    Stage 3a chronic kidney disease    Chronic low back pain    Age-related cognitive decline    Generalized anxiety disorder    Polyneuropathy, unspecified    Restless legs syndrome    Essential (primary) hypertension    Gastro-esophageal reflux disease without esophagitis    Pulmonary hypertension, unspecified    Coronary artery disease involving native coronary artery of native heart without angina pectoris    Presence of cardiac pacemaker    Chronic HFrEF (heart failure with reduced ejection fraction)    Atrial flutter with rapid ventricular response    Weakness    AF (atrial fibrillation)    Chronic heart failure with preserved ejection fraction (HFpEF)    Fall    Nasal bones, closed fracture    Iron deficiency anemia    Atrial fibrillation     Past Medical History:   Diagnosis Date    Acquired spondylolisthesis of lumbosacral region 2020    Acute kidney injury 2022    Anxiety     Arthritis     Atrial fibrillation     paroxysmal    Broken shoulder     left-- due to fall 20 was at [facility]    Chronic pain disorder 2021    Chronic systolic CHF (congestive heart failure) 2023    EF 36-40%    Coronary artery disease involving native coronary artery of native heart without angina pectoris 2021    nonobstructive    DDD (degenerative disc disease), cervical 10/8/2020    DDD (degenerative disc disease), lumbar     Depression     Edema     2020 foot    GERD (gastroesophageal reflux disease)     H/O fall     Heart failure with mid-range ejection fraction 2022    EF 45%  per 2D echo    Hypertension     Hypothyroidism     Insomnia     Low back pain     Moderate mitral regurgitation 01/05/2023    Neuropathy     Nonrheumatic tricuspid valve regurgitation 1/23/2020    Pain in both feet     Polypharmacy 4/3/2021    PONV (postoperative nausea and vomiting)     Pulmonary hypertension     Radiculopathy     Restless legs     Sacroiliitis 4/13/2018    Sick sinus syndrome 1/14/2023    Added automatically from request for surgery [id number]    Spondylolisthesis     Stage 3a chronic kidney disease     Urinary incontinence     Vitamin D deficiency 10/8/2020     Past Surgical History:   Procedure Laterality Date    BACK SURGERY  07/19/2018    PDC &  PSF L3-L5 insitu    CARDIAC CATHETERIZATION N/A 4/2/2021    Procedure: Cardiac Catheterization/Vascular Study;  Surgeon: Barrett Evans MD;  Location: [facility] CATH INVASIVE LOCATION;  Service: Cardiovascular;  Laterality: N/A;    CARDIAC ELECTROPHYSIOLOGY PROCEDURE N/A 1/17/2023    Procedure: Pacemaker DC new;  Surgeon: Baljeet Valero MD;  Location: [facility] CATH INVASIVE LOCATION;  Service: Cardiovascular;  Laterality: N/A;    CHOLECYSTECTOMY      COLONOSCOPY      COLONOSCOPY N/A 7/10/2024    Procedure: COLONOSCOPY with cold snare polypectomy x1 and endoscopic clipping x2;  Surgeon: MARLIN Vanegas MD;  Location: [facility] ENDOSCOPY;  Service: Gastroenterology;  Laterality: N/A;  Post- colon polyps, diverticulosis    ENDOSCOPY N/A 9/14/2023    Procedure: ESOPHAGOGASTRODUODENOSCOPY;  Surgeon: Ulysses Lamas MD;  Location: [facility] ENDOSCOPY;  Service: Gastroenterology;  Laterality: N/A;  gastritis, inflammatory gastric polyp    ENDOSCOPY N/A 7/10/2024    Procedure: ESOPHAGOGASTRODUODENOSCOPY;  Surgeon: MARLIN Vanegas MD;  Location: [facility] ENDOSCOPY;  Service: Gastroenterology;  Laterality: N/A;  Post- hiatal hernia, gastric polyps, gastritis    HYSTERECTOMY      ROTATOR CUFF REPAIR Left       General Information       Row Name  03/27/25 1230          Physical Therapy Time and Intention    Document Type evaluation  -AM     Mode of Treatment physical therapy  -AM       Row Name 03/27/25 1230          General Information    Patient Profile Reviewed yes  -AM     Prior Level of Function independent:;all household mobility;community mobility;gait;transfer;bed mobility  ambulates with rollator outside of home and st cane inside of home  -AM     Existing Precautions/Restrictions fall  -AM     Barriers to Rehab none identified  -AM       Row Name 03/27/25 1230          Living Environment    Current Living Arrangements home  -AM     People in Home alone  -AM       Row Name 03/27/25 1230          Home Main Entrance    Number of Stairs, Main Entrance none  -AM       Row Name 03/27/25 1230          Stairs Within Home, Primary    Number of Stairs, Within Home, Primary none  -AM       Row Name 03/27/25 1230          Cognition    Orientation Status (Cognition) oriented x 4  -AM       Row Name 03/27/25 1230          Safety Issues/Impairments Affecting Functional Mobility    Impairments Affecting Function (Mobility) balance;endurance/activity tolerance;strength  -AM     Comment, Safety Issues/Impairments (Mobility) gait belt utilized  -AM               User Key  (r) = Recorded By, (t) = Taken By, (c) = Cosigned By      Initials Name Provider Type    AM Can Gaspar, PT Physical Therapist                   Mobility       Row Name 03/27/25 1231          Bed Mobility    Bed Mobility bed mobility (all) activities  -AM     All Activities, [place] (Bed Mobility) modified independence  -AM       Row Name 03/27/25 1231          Sit-Stand Transfer    Sit-Stand [place] (Transfers) contact guard;standby assist  -AM     Assistive Device (Sit-Stand Transfers) walker, front-wheeled  -AM       Row Name 03/27/25 1231          Gait/Stairs (Locomotion)    [place] Level (Gait) contact guard;standby assist;1 person assist  -AM     Assistive Device (Gait)  walker, front-wheeled  -AM     Distance in Feet (Gait) 45  x2  -AM     Deviations/Abnormal Patterns (Gait) gait speed decreased  -AM     Bilateral Gait Deviations forward flexed posture  -AM     Comment, (Gait/Stairs) decreased endurance  -AM               User Key  (r) = Recorded By, (t) = Taken By, (c) = Cosigned By      Initials Name Provider Type    AM Can Gaspar, PT Physical Therapist                   Obj/Interventions       Row Name 03/27/25 1232          Range of Motion Comprehensive    General Range of Motion no range of motion deficits identified  -AM       Row Name 03/27/25 1232          Strength Comprehensive (MMT)    Comment, General Manual Muscle Testing (MMT) Assessment 4+/5 BLEs  -AM       Row Name 03/27/25 1232          Motor Skills    Motor Skills functional endurance  -AM     Functional Endurance fair +  -AM       Row Name 03/27/25 1232          Balance    Balance Assessment sitting static balance;sitting dynamic balance;standing static balance;standing dynamic balance  -AM     Static Sitting Balance independent  -AM     Dynamic Sitting Balance independent  -AM     Position, Sitting Balance unsupported;sitting edge of bed  -AM     Static Standing Balance contact guard;standby assist  -AM     Dynamic Standing Balance contact guard;standby assist  -AM     Position/Device Used, Standing Balance supported;walker, rolling  -AM       Row Name 03/27/25 1232          Sensory Assessment (Somatosensory)    Sensory Assessment (Somatosensory) --  decreased sensation B feet  -AM               User Key  (r) = Recorded By, (t) = Taken By, (c) = Cosigned By      Initials Name Provider Type    AM Can Gaspar, PT Physical Therapist                   Goals/Plan       Row Name 03/27/25 1238          Transfer Goal 1 (PT)    Activity/Assistive Device (Transfer Goal 1, PT) transfers, all;walker, rolling  -AM     [place] Level/Cues Needed (Transfer Goal 1, PT) modified independence  -AM     Time Frame  (Transfer Goal 1, PT) long term goal (LTG)  -AM       Row Name 03/27/25 1238          Gait Training Goal 1 (PT)    Activity/Assistive Device (Gait Training Goal 1, PT) gait (walking locomotion);walker, rolling  -AM     [place] Level (Gait Training Goal 1, PT) modified independence  -AM     Distance (Gait Training Goal 1, PT) 150'  -AM     Time Frame (Gait Training Goal 1, PT) long term goal (LTG)  -AM       Row Name 03/27/25 1238          Therapy Assessment/Plan (PT)    Planned Therapy Interventions (PT) balance training;gait training;strengthening;patient/family education;transfer training  -AM               User Key  (r) = Recorded By, (t) = Taken By, (c) = Cosigned By      Initials Name Provider Type    AM Can Gaspar, PT Physical Therapist                   Clinical Impression       Row Name 03/27/25 1233          Pain    Pretreatment Pain Rating 3/10  -AM     Posttreatment Pain Rating 3/10  -AM     Pain Location foot  -AM     Pain Side/Orientation bilateral  -AM     Pain Management Interventions exercise or physical activity utilized  -AM     Response to Pain Interventions no change per patient report  -AM     Pre/Posttreatment Pain Comment pt stating paim meds given earlier which helped  -AM       Row Name 03/27/25 1233          Plan of Care Review    Plan of Care Reviewed With patient  -AM     Outcome Evaluation Pt is a 83 y/o female with c/o heart palpitations with R-sided chest pressure with associated nausea and SOB.  Symptoms had resolved in ED but pt tachycardic.  EKG consistent with A-fb with rVr.  Chest X-ray: (-) acute.  Pt reports she lives alone in a 1 story home wtih no steps to enter into home.  Pt ambulates with rollator outside of the home and st cane inside of the home.  Pt on room air and telemetry and has B foot edema.  Pt states she has restless leg syndrome.  Pt was mod I for bed mobility, CGA/SBA for transfers with RW and ambulated 45' x 2 with RW with CGA/SBA.  Pt required  prolonged standing rest break between ambulation trials secondary to fatigue.  Recommend HHPT.  -AM       Row Name 03/27/25 1233          Therapy Assessment/Plan (PT)    Patient/Family Therapy Goals Statement (PT) To go home  -AM     Rehab Potential (PT) good  -AM     Criteria for Skilled Interventions Met (PT) yes  -AM     Therapy Frequency (PT) 3 times/wk  -AM     Predicted Duration of Therapy Intervention (PT) until d/c  -AM       Row Name 03/27/25 1233          Vital Signs    O2 Delivery Pre Treatment room air  -AM     O2 Delivery Intra Treatment room air  -AM     O2 Delivery Post Treatment room air  -AM     Pre Patient Position Supine  -AM     Intra Patient Position Standing  -AM     Post Patient Position Supine  -AM       Row Name 03/27/25 1233          Positioning and Restraints    Pre-Treatment Position in bed  -AM     Post Treatment Position bed  -AM     In Bed supine;call light within reach;encouraged to call for assist;with other staff  -AM               User Key  (r) = Recorded By, (t) = Taken By, (c) = Cosigned By      Initials Name Provider Type    AM Can Gaspar, PT Physical Therapist                   Outcome Measures       Row Name 03/27/25 1238 03/27/25 0800       How much help from another person do you currently need...    Turning from your back to your side while in flat bed without using bedrails? 4  -AM 4  -ES (r) JH (t) ES (c)    Moving from lying on back to sitting on the side of a flat bed without bedrails? 4  -AM 4  -ES (r) JH (t) ES (c)    Moving to and from a bed to a chair (including a wheelchair)? 3  -AM 4  -ES (r) JH (t) ES (c)    Standing up from a chair using your arms (e.g., wheelchair, bedside chair)? 3  -AM 3  -ES (r) JH (t) ES (c)    Climbing 3-5 steps with a railing? 2  -AM 2  -ES (r) JH (t) ES (c)    To walk in hospital room? 3  -AM 3  -ES (r) JH (t) ES (c)    AM-PAC 6 Clicks Score (PT) 19  -AM 20  -JH              User Key  (r) = Recorded By, (t) = Taken By, (c) = Cosigned  By      Initials Name Provider Type    Helen Schmidt, RN Registered Nurse    AM Can Gaspar PT Physical Therapist    Savanah Covington RN Registered Nurse                                 Physical Therapy Education       Title: PT OT SLP Therapies (Done)       Topic: Physical Therapy (Done)       Point: Mobility training (Done)       Learning Progress Summary            Patient Acceptance, E,TB, VU by AM at 3/27/2025 1238                      Point: Body mechanics (Done)       Learning Progress Summary            Patient Acceptance, E,TB, VU by AM at 3/27/2025 1238                      Point: Precautions (Done)       Learning Progress Summary            Patient Acceptance, E,TB, VU by AM at 3/27/2025 1238                                      User Key       Initials Effective Dates Name Provider Type Discipline    AM 05/10/21 -  Can Gaspar PT Physical Therapist PT                  PT Recommendation and Plan  Planned Therapy Interventions (PT): balance training, gait training, strengthening, patient/family education, transfer training  Outcome Evaluation: Pt is a 85 y/o female with c/o heart palpitations with R-sided chest pressure with associated nausea and SOB.  Symptoms had resolved in ED but pt tachycardic.  EKG consistent with A-fb with rVr.  Chest X-ray: (-) acute.  Pt reports she lives alone in a 1 story home wtih no steps to enter into home.  Pt ambulates with rollator outside of the home and st cane inside of the home.  Pt on room air and telemetry and has B foot edema.  Pt states she has restless leg syndrome.  Pt was mod I for bed mobility, CGA/SBA for transfers with RW and ambulated 45' x 2 with RW with CGA/SBA.  Pt required prolonged standing rest break between ambulation trials secondary to fatigue.  Recommend HHPT.     Time Calculation:         PT Charges       Row Name 03/27/25 1239             Time Calculation    Start Time 1036  -AM      Stop Time 1107  -AM      Time Calculation  (min) 31 min  -AM      PT Received On 03/27/25  -AM      PT - Next Appointment 03/28/25  -AM      PT Goal Re-Cert Due Date 04/10/25  -AM                User Key  (r) = Recorded By, (t) = Taken By, (c) = Cosigned By      Initials Name Provider Type    Can Lord, PT Physical Therapist                  Therapy Charges for Today       Code Description Service Date Service Provider Modifiers Qty    88839955367 HC PT EVAL MOD COMPLEXITY 4 3/27/2025 Can Gaspar, PT GP 1            PT G-Codes  AM-PAC 6 Clicks Score (PT): 19  PT Discharge Summary  Anticipated Discharge Disposition (PT): home with home health    Can Gaspar, PT  3/27/2025

## 2025-03-27 NOTE — PLAN OF CARE
Goal Outcome Evaluation:   Patient was complaining about leg pain throughout the night. Was given morphine twice and it helped for 2 hours each time. Consulted doctor, doctor prescribed gabapentin, gabapentin worked and patient was able to sleep after. Will continue with current plan.

## 2025-03-27 NOTE — PLAN OF CARE
Problem: Adult Inpatient Plan of Care  Goal: Plan of Care Review  Outcome: Met  Goal: Patient-Specific Goal (Individualized)  Outcome: Met  Goal: Absence of Hospital-Acquired Illness or Injury  Outcome: Met  Intervention: Identify and Manage Fall Risk  Recent Flowsheet Documentation  Taken 3/27/2025 1200 by Savanah Garg RN  Safety Promotion/Fall Prevention:   clutter free environment maintained   room organization consistent   safety round/check completed  Taken 3/27/2025 1000 by Savanah Garg RN  Safety Promotion/Fall Prevention:   clutter free environment maintained   room organization consistent   safety round/check completed  Taken 3/27/2025 0800 by Savanah Garg RN  Safety Promotion/Fall Prevention:   clutter free environment maintained   room organization consistent   safety round/check completed  Intervention: Prevent Skin Injury  Recent Flowsheet Documentation  Taken 3/27/2025 0800 by Savanah Garg RN  Body Position:   supine   position changed independently  Intervention: Prevent and Manage VTE (Venous Thromboembolism) Risk  Recent Flowsheet Documentation  Taken 3/27/2025 0800 by Savanah Garg RN  VTE Prevention/Management: SCDs (sequential compression devices) off  Intervention: Prevent Infection  Recent Flowsheet Documentation  Taken 3/27/2025 1200 by Savanah Garg RN  Infection Prevention: single patient room provided  Taken 3/27/2025 1000 by Savanah Garg RN  Infection Prevention: single patient room provided  Taken 3/27/2025 0800 by Savanah Garg RN  Infection Prevention: single patient room provided  Goal: Optimal Comfort and Wellbeing  Outcome: Met  Intervention: Provide Person-Centered Care  Recent Flowsheet Documentation  Taken 3/27/2025 0800 by Savanah Garg RN  Trust Relationship/Rapport:   care explained   choices provided   questions answered   questions encouraged  Goal: Readiness for Transition of Care  Outcome: Met     Problem:  Comorbidity Management  Goal: Maintenance of Heart Failure Symptom Control  Outcome: Met  Intervention: Maintain Heart Failure Management  Recent Flowsheet Documentation  Taken 3/27/2025 1200 by Savanah Garg RN  Medication Review/Management: medications reviewed  Taken 3/27/2025 1000 by Savanah Garg RN  Medication Review/Management: medications reviewed  Taken 3/27/2025 0800 by Savanah Garg RN  Medication Review/Management: medications reviewed  Goal: Blood Pressure in Desired Range  Outcome: Met  Intervention: Maintain Blood Pressure Management  Recent Flowsheet Documentation  Taken 3/27/2025 1200 by Savanah Garg RN  Medication Review/Management: medications reviewed  Taken 3/27/2025 1000 by Savanah Garg RN  Medication Review/Management: medications reviewed  Taken 3/27/2025 0800 by Savanah Garg RN  Medication Review/Management: medications reviewed     Problem: Fall Injury Risk  Goal: Absence of Fall and Fall-Related Injury  Outcome: Met  Intervention: Identify and Manage Contributors  Recent Flowsheet Documentation  Taken 3/27/2025 1200 by Savanah Garg RN  Medication Review/Management: medications reviewed  Taken 3/27/2025 1000 by Savanah Garg RN  Medication Review/Management: medications reviewed  Taken 3/27/2025 0800 by Savanah Garg RN  Medication Review/Management: medications reviewed  Intervention: Promote Injury-Free Environment  Recent Flowsheet Documentation  Taken 3/27/2025 1200 by Savanah Garg RN  Safety Promotion/Fall Prevention:   clutter free environment maintained   room organization consistent   safety round/check completed  Taken 3/27/2025 1000 by Savanah Garg RN  Safety Promotion/Fall Prevention:   clutter free environment maintained   room organization consistent   safety round/check completed  Taken 3/27/2025 0800 by Savanah Garg RN  Safety Promotion/Fall Prevention:   clutter free environment maintained   room  organization consistent   safety round/check completed   Goal Outcome Evaluation:

## 2025-03-27 NOTE — PLAN OF CARE
Goal Outcome Evaluation:  Plan of Care Reviewed With: patient           Outcome Evaluation: Pt is a 83 y/o female with c/o heart palpitations with R-sided chest pressure with associated nausea and SOB.  Symptoms had resolved in ED but pt tachycardic.  EKG consistent with A-fb with rVr.  Chest X-ray: (-) acute.  Pt reports she lives alone in a 1 story home wtih no steps to enter into home.  Pt ambulates with rollator outside of the home and st cane inside of the home.  Pt on room air and telemetry and has B foot edema.  Pt states she has restless leg syndrome.  Pt was mod I for bed mobility, CGA/SBA for transfers with RW and ambulated 45' x 2 with RW with CGA/SBA.  Pt required prolonged standing rest break between ambulation trials secondary to fatigue.  Recommend HHPT.    Anticipated Discharge Disposition (PT): home with home health

## 2025-03-27 NOTE — OUTREACH NOTE
Prep Survey      Flowsheet Row Responses   [facility] facility patient discharged from? [facility]   Is LACE score < 7 ? No   Eligibility [facility]   Date of Admission 03/26/25   Date of Discharge 03/27/25   Discharge Disposition Home or Self Care   Discharge diagnosis Atrial fibrillation with RVR  Principal problem   Does the patient have one of the following disease processes/diagnoses(primary or secondary)? Other   Does the patient have Home health ordered? No   Is there a DME ordered? No   Prep survey completed? Yes            REILLY CABRERA - Registered Nurse

## 2025-03-27 NOTE — DISCHARGE SUMMARY
[facility] Medicine Services  Discharge Summary    Date of Service: 3/27/2025  Patient Name: Catalina Moreno  : 1940  MRN: [id number]    Date of Admission: 3/26/2025  Discharge Diagnosis:   A-fib with RVR  Chronic systolic heart failure  Sick sinus syndrome status post pacemaker  Valvular heart disease  Hypertension  Dyslipidemia  Hypothyroidism  Anxiety with depression  GERD    Date of Discharge: 3/27/2025  Primary Care Physician: Liv Duron APRN      Presenting Problem:   Chest pain [R07.9]  Tachycardia [R00.0]  Chest pain, unspecified type [R07.9]    Active and Resolved Hospital Problems:  Active Hospital Problems    Diagnosis POA    **Atrial fibrillation with RVR [I48.91] Yes      Resolved Hospital Problems   No resolved problems to display.         Hospital Course     HPI:    Patient is an 84-year-old female who presented to the hospital with complaints of chest pressure and palpitations.  Please see H&P for details.    Hospital Course:  The patient was admitted to the hospital when she was found to be in A-fib with RVR.  She has a previous history of A-fib that was generally rate controlled.  She was started on Cardizem drip but eventually weaned off of this to her home dose of metoprolol and diltiazem.  However her home diltiazem dose was increased from 30 mg twice daily to 3 times daily.  Her heart rate remained well-controlled after this.  She was continued on her home dose of Eliquis.  Of note, the patient underwent LHC on 3/10 which did not show any evidence of ischemia.  She is stable for discharge.  PT/OT consultation was obtained prior to discharge.        DISCHARGE Follow Up Recommendations for labs and diagnostics: Follow-up with cardiology in 2 weeks        Day of Discharge     Vital Signs:  Temp:  [97.7 °F (36.5 °C)-98.7 °F (37.1 °C)] 98.7 °F (37.1 °C)  Heart Rate:  [69-71] 70  Resp:  [14-22] 14  BP: ()/(42-81) 103/42    Physical Exam:  Physical Exam   General  Appearance:  Alert, cooperative, no distress, appears stated age  Head:  Normocephalic, without obvious abnormality, atraumatic  Eyes:  PERRL, conjunctiva/corneas clear, EOM's intact, fundi benign, both eyes  Ears:  Normal TM's and external ear canals, both ears  Nose: Nares normal, septum midline, mucosa normal, no drainage or sinus tenderness  Throat: Lips, mucosa, and tongue normal; teeth and gums normal  Neck: Supple, symmetrical, trachea midline, no adenopathy, thyroid: not enlarged, symmetric, no tenderness/mass/nodules, no carotid bruit or JVD  Lungs:   Clear to auscultation bilaterally, respirations unlabored  Heart:  Regular rate and rhythm, S1, S2 normal, no murmur, rub or gallop  Abdomen:  Soft, non-tender, bowel sounds active all four quadrants,  no masses, no organomegaly  Extremities: Extremities normal, atraumatic, no cyanosis or edema  Pulses: 2+ and symmetric  Skin: Skin color, texture, turgor normal, no rashes or lesions  Neurologic: Normal        Pertinent  and/or Most Recent Results     LAB RESULTS:      Lab 03/27/25 0105 03/26/25  0724   WBC 7.31 7.47   HEMOGLOBIN 13.5 13.2   HEMATOCRIT 43.1 41.7   PLATELETS 216 202   NEUTROS ABS 4.24 4.88   IMMATURE GRANS (ABS) 0.03 0.02   LYMPHS ABS 2.36 1.88   MONOS ABS 0.45 0.45   EOS ABS 0.14 0.17   MCV 98.6* 97.4*   D DIMER QUANT  --  0.60         Lab 03/27/25  0105 03/26/25  0845 03/26/25  0724   SODIUM 137  --  136   POTASSIUM 4.6  --  4.7   CHLORIDE 102  --  100   CO2 22.9  --  24.9   ANION GAP 12.1  --  11.1   BUN 18  --  20   CREATININE 1.00  --  0.94   EGFR 56.0*  --  60.3   GLUCOSE 100*  --  111*   CALCIUM 9.4  --  9.4   MAGNESIUM 2.2 2.1  --    TSH  --  4.570*  --          Lab 03/26/25  0724   TOTAL PROTEIN 7.4   ALBUMIN 4.4   GLOBULIN 3.0   ALT (SGPT) 9   AST (SGOT) 18   BILIRUBIN 0.6   ALK PHOS 72         Lab 03/26/25  0845 03/26/25  0724   PROBNP  --  2,416.0*   HSTROP T 9 10                 Brief Urine Lab Results  (Last result in the past 365  days)        Color   Clarity   Blood   Leuk Est   Nitrite   Protein   CREAT   Urine HCG        07/24/24 1504 Yellow   Clear   Negative   Trace   Negative   Negative                 Microbiology Results (last 10 days)       ** No results found for the last 240 hours. **            XR Chest 1 View  Result Date: 3/26/2025  Impression: Impression: No acute cardiopulmonary abnormality. Electronically Signed: Celia Cummings MD  3/26/2025 8:04 AM EDT  Workstation ID: [id number]    XR Chest 1 View  Result Date: 3/10/2025  Impression: Impression: Cardiomegaly. No active disease. Electronically Signed: Quan Miguel MD  3/10/2025 2:13 AM EDT  Workstation ID: [id number]      Results for orders placed during the hospital encounter of 03/04/22    Duplex Venous Lower Extremity - Bilateral CAR    Interpretation Summary  · Normal bilateral lower extremity venous duplex scan.  · Nonvascular cystic mass located in the left posterior mid calf.      Results for orders placed during the hospital encounter of 03/04/22    Duplex Venous Lower Extremity - Bilateral CAR    Interpretation Summary  · Normal bilateral lower extremity venous duplex scan.  · Nonvascular cystic mass located in the left posterior mid calf.      Results for orders placed during the hospital encounter of 01/29/25    Adult Transesophageal Echo (CHEMO) W/ Cont if Necessary Per Protocol    Interpretation Summary    Left ventricular systolic function is normal. Estimated left ventricular EF = 55% Left ventricular ejection fraction appears to be 51 - 55%.    Left ventricular wall thickness is consistent with mild concentric hypertrophy.    Left ventricular diastolic function is consistent with (grade III w/high LAP) reversible restrictive pattern.    The right ventricular cavity is mild to moderately dilated.    The left atrial cavity is severely dilated.    The right atrial cavity is moderate to severely  dilated.    Abnormal mitral valve structure consistent with dilated  annulus.    Moderate to severe mitral valve regurgitation is present with a centrally-directed jet noted.    Moderate tricuspid valve regurgitation is present.    Estimated right ventricular systolic pressure from tricuspid regurgitation is mildly elevated (35-45 mmHg).    There is a trivial pericardial effusion.    Procedure indication  Persistent symptomatic atrial fibrillation    conscious sedation administered by anesthesia  Timeout before procedure    consent obtained before procedure      Procedure note  after obtaining a valid consent patient was sedated by Anesthesia and a CHEMO probe was easily placed into esophagus with multiplane imaging with 2D, color and Doppler followed by bubble study with agitated saline without any complications    CHEMO  Findings    LV ejection fraction between 51 to 55%  Severe left atrial enlargement with mitral annular dilatation with moderate to severe central mitral regurgitation and moderate TR with mild pulm hypertension  Trivial effusion noted  No shunt    Plan  Rate control only because of severe left enlargement and not a good candidate for rhythm control and ablation to be deferred  Increase the beta-blocker dose  Stop Cardizem  Outpatient follow-up    Complications none      Procedure done  Transesophageal echocardiography      Electronically signed by Baljeet Valero MD, 01/29/25, 3:12 PM EST.      Labs Pending at Discharge:  Pending Results       None            Procedures Performed           Consults:   Consults       Date and Time Order Name Status Description    3/26/2025 11:42 AM Inpatient Cardiology Consult Completed     3/26/2025  9:57 AM Hospitalist (on-call MD unless specified)      3/10/2025  3:25 AM Cardiology (on-call MD unless specified) Completed               Discharge Details        Discharge Medications        New Medications        Instructions Start Date   gabapentin 100 MG capsule  Commonly known as: NEURONTIN   100 mg, Oral, Nightly              Changes to Medications        Instructions Start Date   dilTIAZem 30 MG tablet  Commonly known as: CARDIZEM  What changed: when to take this   30 mg, Oral, Every 8 Hours Scheduled             Continue These Medications        Instructions Start Date   atorvastatin 20 MG tablet  Commonly known as: Lipitor   20 mg, Oral, Daily      Eliquis 5 MG tablet tablet  Generic drug: apixaban   TAKE 1 TABLET BY MOUTH EVERY 12 HOURS FOR ATRIAL FIBRILLATION      ferrous gluconate 324 MG tablet  Commonly known as: FERGON   324 mg, Oral, Every Other Day      levothyroxine 50 MCG tablet  Commonly known as: SYNTHROID, LEVOTHROID   50 mcg, Oral, Daily      Magnesium 250 MG capsule   1 capsule, Daily      metoprolol succinate  MG 24 hr tablet  Commonly known as: TOPROL-XL   100 mg, Oral, Daily      pantoprazole 40 MG EC tablet  Commonly known as: PROTONIX   40 mg, Oral, Daily      spironolactone 25 MG tablet  Commonly known as: ALDACTONE   25 mg, Daily      Vitamin D (Cholecalciferol) 10 MCG (400 UNIT) tablet  Commonly known as: CHOLECALCIFEROL   400 Units, Daily               Allergies   Allergen Reactions    Baclofen Rash    Codeine Nausea Only    Ibuprofen Unknown (See Comments)     Patient doesn't know----Motin    Methocarbamol Unknown (See Comments)     Patient doesn't know    Naproxen Unknown (See Comments)     Pt. Doesn't know     Tizanidine Hcl Other (See Comments)     Syncope     Tolmetin Dizziness     Pt. Doesn't know-- same as Tolectin         Discharge Disposition:     Home or Self Care    Diet:  Hospital:  Diet Order   Procedures    Diet: Cardiac; Healthy Heart (2-3 Na+); Fluid Consistency: Thin (IDDSI 0)         Discharge Activity:         CODE STATUS:  Code Status and Medical Interventions: CPR (Attempt to Resuscitate); Full Support   Ordered at: 03/26/25 1115     Code Status (Patient has no pulse and is not breathing):    CPR (Attempt to Resuscitate)     Medical Interventions (Patient has pulse or is  breathing):    Full Support         No future appointments.    Additional Instructions for the Follow-ups that You Need to Schedule       Discharge Follow-up with Specified Provider: Follow-up with cardiology in 2 weeks.; 2 Weeks   As directed      To: Follow-up with cardiology in 2 weeks.   Follow Up: 2 Weeks                Time spent on Discharge including face to face service: Greater than 30 minutes    Signature: Electronically signed by Samuel Hinojosa MD, 03/27/25, 11:58 EDT.  [facility] Hospitalist Team

## 2025-03-27 NOTE — CASE MANAGEMENT/SOCIAL WORK
Case Management Discharge Note      Final Note: Routine home.         Selected Continued Care - Discharged on 3/27/2025 Admission date: 3/26/2025 - Discharge disposition: Home or Self Care       Transportation Services  Private: Car    Final Discharge Disposition Code: 01 - home or self-care

## 2025-03-27 NOTE — PROGRESS NOTES
CARDIOLOGY PROGRESS NOTE:    Catalina Moreno  84 y.o.  female  1940  [id number]      Referring Provider: Hospitalist    Reason for follow-up: Atrial fibrillation     Patient Care Team:  Liv Duron APRN as PCP - General (Nurse Practitioner)  Flash Gonzalez MD as Consulting Physician (Cardiology)  Shefali Worthy MD as Consulting Physician (Pain Medicine)  Demetrice Gupta APRN as Nurse Practitioner (Cardiology)    Subjective .  Patient doing well without any symptom    Objective lying in bed comfortably     Review of Systems   Constitutional: Negative for malaise/fatigue.   Cardiovascular:  Negative for chest pain, dyspnea on exertion, leg swelling and palpitations.   Respiratory:  Negative for cough and shortness of breath.    Gastrointestinal:  Negative for abdominal pain, nausea and vomiting.   Neurological:  Negative for dizziness, focal weakness, headaches, light-headedness and numbness.   All other systems reviewed and are negative.      Allergies: Baclofen, Codeine, Ibuprofen, Methocarbamol, Naproxen, Tizanidine hcl, and Tolmetin    Scheduled Meds:apixaban, 5 mg, Oral, Q12H  atorvastatin, 20 mg, Oral, Nightly  dilTIAZem, 30 mg, Oral, Q8H  ferrous sulfate, 325 mg, Oral, Every Other Day  levothyroxine, 50 mcg, Oral, Q AM  metoprolol succinate XL, 100 mg, Oral, Daily  pantoprazole, 40 mg, Oral, Daily  spironolactone, 25 mg, Oral, Daily      Continuous Infusions:   PRN Meds:.  senna-docusate sodium **AND** polyethylene glycol **AND** bisacodyl **AND** bisacodyl    Calcium Replacement - Follow Nurse / BPA Driven Protocol    Magnesium Standard Dose Replacement - Follow Nurse / BPA Driven Protocol    melatonin    Morphine **AND** naloxone    nitroglycerin    ondansetron ODT **OR** ondansetron    Phosphorus Replacement - Follow Nurse / BPA Driven Protocol    Potassium Replacement - Follow Nurse / BPA Driven Protocol        VITAL SIGNS  Vitals:    03/27/25 0331 03/27/25 0558 03/27/25 0842 03/27/25 1222  "  BP: 97/42 124/67 103/42    BP Location: Right arm  Left arm    Patient Position: Lying  Lying    Pulse: 70  70 70   Resp: 20  14 14   Temp: 98 °F (36.7 °C)  98.7 °F (37.1 °C) 97.1 °F (36.2 °C)   TempSrc: Oral  Oral Oral   SpO2: 90%  91% 92%   Weight:       Height:           Flowsheet Rows      Flowsheet Row First Filed Value   Admission Height 152.4 cm (60\") Documented at 03/26/2025 0633   Admission Weight 69.9 kg (154 lb) Documented at 03/26/2025 0729             TELEMETRY: Ventricular pacemaker rhythm    Physical Exam:  Constitutional:       Appearance: Well-developed.   Eyes:      General: No scleral icterus.     Conjunctiva/sclera: Conjunctivae normal.   HENT:      Head: Normocephalic and atraumatic.   Neck:      Vascular: No carotid bruit or JVD.   Pulmonary:      Effort: Pulmonary effort is normal.      Breath sounds: Normal breath sounds. No wheezing. No rales.   Cardiovascular:      Normal rate. Irregularly irregular rhythm.   Pulses:     Intact distal pulses.   Abdominal:      General: Bowel sounds are normal.      Palpations: Abdomen is soft.   Musculoskeletal:      Cervical back: Normal range of motion and neck supple. Skin:     General: Skin is warm and dry.      Findings: No rash.   Neurological:      Mental Status: Alert.          Results Review:   I reviewed the patient's new clinical results.  Lab Results (last 24 hours)       Procedure Component Value Units Date/Time    Magnesium [[id number]]  (Normal) Collected: 03/27/25 0105    Specimen: Blood from Arm, Left Updated: 03/27/25 0203     Magnesium 2.2 mg/dL     Basic Metabolic Panel [[id number]]  (Abnormal) Collected: 03/27/25 0105    Specimen: Blood from Arm, Left Updated: 03/27/25 0203     Glucose 100 mg/dL      BUN 18 mg/dL      Creatinine 1.00 mg/dL      Sodium 137 mmol/L      Potassium 4.6 mmol/L      Chloride 102 mmol/L      CO2 22.9 mmol/L      Calcium 9.4 mg/dL      BUN/Creatinine Ratio 18.0     Anion Gap 12.1 mmol/L      eGFR 56.0 " mL/min/1.73     Narrative:      GFR Categories in Chronic Kidney Disease (CKD)      GFR Category          GFR (mL/min/1.73)    Interpretation  G1                     90 or greater         Normal or high (1)  G2                      60-89                Mild decrease (1)  G3a                   45-59                Mild to moderate decrease  G3b                   30-44                Moderate to severe decrease  G4                    15-29                Severe decrease  G5                    14 or less           Kidney failure          (1)In the absence of evidence of kidney disease, neither GFR category G1 or G2 fulfill the criteria for CKD.    eGFR calculation 2021 CKD-EPI creatinine equation, which does not include race as a factor    CBC & Differential [[id number]]  (Abnormal) Collected: 03/27/25 0105    Specimen: Blood from Arm, Left Updated: 03/27/25 0135    Narrative:      The following orders were created for panel order CBC & Differential.  Procedure                               Abnormality         Status                     ---------                               -----------         ------                     CBC Auto Differential[[id number]]        Abnormal            Final result                 Please view results for these tests on the individual orders.    CBC Auto Differential [[id number]]  (Abnormal) Collected: 03/27/25 0105    Specimen: Blood from Arm, Left Updated: 03/27/25 0135     WBC 7.31 10*3/mm3      RBC 4.37 10*6/mm3      Hemoglobin 13.5 g/dL      Hematocrit 43.1 %      MCV 98.6 fL      MCH 30.9 pg      MCHC 31.3 g/dL      RDW 14.6 %      RDW-SD 53.1 fl      MPV 9.7 fL      Platelets 216 10*3/mm3      Neutrophil % 58.0 %      Lymphocyte % 32.3 %      Monocyte % 6.2 %      Eosinophil % 1.9 %      Basophil % 1.2 %      Immature Grans % 0.4 %      Neutrophils, Absolute 4.24 10*3/mm3      Lymphocytes, Absolute 2.36 10*3/mm3      Monocytes, Absolute 0.45 10*3/mm3      Eosinophils, Absolute 0.14  10*3/mm3      Basophils, Absolute 0.09 10*3/mm3      Immature Grans, Absolute 0.03 10*3/mm3      nRBC 0.0 /100 WBC             Imaging Results (Last 24 Hours)       ** No results found for the last 24 hours. **            EKG      I personally viewed and interpreted the patient's EKG/Telemetry data:    ECHOCARDIOGRAM:  Results for orders placed during the hospital encounter of 01/29/25    Adult Transesophageal Echo (CHEMO) W/ Cont if Necessary Per Protocol    Interpretation Summary    Left ventricular systolic function is normal. Estimated left ventricular EF = 55% Left ventricular ejection fraction appears to be 51 - 55%.    Left ventricular wall thickness is consistent with mild concentric hypertrophy.    Left ventricular diastolic function is consistent with (grade III w/high LAP) reversible restrictive pattern.    The right ventricular cavity is mild to moderately dilated.    The left atrial cavity is severely dilated.    The right atrial cavity is moderate to severely  dilated.    Abnormal mitral valve structure consistent with dilated annulus.    Moderate to severe mitral valve regurgitation is present with a centrally-directed jet noted.    Moderate tricuspid valve regurgitation is present.    Estimated right ventricular systolic pressure from tricuspid regurgitation is mildly elevated (35-45 mmHg).    There is a trivial pericardial effusion.    Procedure indication  Persistent symptomatic atrial fibrillation    conscious sedation administered by anesthesia  Timeout before procedure    consent obtained before procedure      Procedure note  after obtaining a valid consent patient was sedated by Anesthesia and a CHEMO probe was easily placed into esophagus with multiplane imaging with 2D, color and Doppler followed by bubble study with agitated saline without any complications    CHEMO  Findings    LV ejection fraction between 51 to 55%  Severe left atrial enlargement with mitral annular dilatation with moderate to  severe central mitral regurgitation and moderate TR with mild pulm hypertension  Trivial effusion noted  No shunt    Plan  Rate control only because of severe left enlargement and not a good candidate for rhythm control and ablation to be deferred  Increase the beta-blocker dose  Stop Cardizem  Outpatient follow-up    Complications none      Procedure done  Transesophageal echocardiography      Electronically signed by Baljeet Valero MD, 25, 3:12 PM EST.       STRESS MYOVIEW:  Results for orders placed during the hospital encounter of 03/10/25    Stress Test With Myocardial Perfusion One Day    Interpretation Summary    Myocardial perfusion imaging indicates a normal myocardial perfusion study with no evidence of ischemia. Impressions are consistent with a low risk study.    Left ventricular ejection fraction is hyperdynamic (Calculated EF > 70%).    LEXISCAN CARDIOLITE REPORT    DATE OF PROCEDURE:  03/10/25    INDICATION FOR PROCEDURE:  Chest pain, CAD    PROCEDURE PERFORMED: Lexiscan Cardiolite    PROCEDURE COMMENTS:    After informed consent was obtained.  Patient's resting heart rate was 96 bpm, resting blood pressure was 150/98, resting EKG revealed sinus.  Patient was given 0.4 mg of regadenosine for stress testing.  There was no significant change in heart rate, blood pressure, symptoms with regadenoson injection.  Patient tolerated procedure well.  Complications were none.    NUCLEAR IMAGIN.  There was  uniform uptake of Cardiolite both in resting and post stress images, no ischemia seen.  2.  Gated images reveal  normal LV size and contractility, LVEF of 74%.    CONCLUSION:  1.  Lexiscan Cardiolite with  normal perfusion, negative for  ischemia.  2.  Normal wall motion .  LVEF of 74%.    RECOMMENDATIONS:    Clinical correlation recommended.      Nasir Celis MD  03/10/25  15:43 EDT       CARDIAC CATHETERIZATION:  Results for orders placed during the hospital encounter of  03/31/21    Cardiac Catheterization/Vascular Study    Narrative  CARDIAC CATHETERIZATION REPORT    DATE OF PROCEDURE: 4/2/2021      INDICATION FOR PROCEDURE: Abnormal stress test    PROCEDURE PERFORMED: Left heart catheterization, coronary angiography,    PROCEDURE COMMENTS:    All the risks and benefits explained with the patient informed consent was obtained from the patient.  Patient was brought to the cardiac catheterization laboratory placed on the table draped and prepped in the usual sterile fashion.  2% lidocaine was used to anesthetize the right groin area.  Using the modified Seldinger technique 5 Russian sheath was inserted into the right femoral artery.    5 Russian JL4 catheter was used to perform the selective left coronary angiography    5 Russian JR4 catheter was used to perform the selective right coronary angiography    Angio-Seal was placed after exchanging five Russian sheath with six Russian sheath    Patient tolerated procedure well without any immediate complications      FINDINGS:    1. HEMODYNAMICS:  LV end-diastolic pressure 7 mmHg, /80 mmHg.    2. LEFT VENTRICULOGRAPHY: Not done patient had echocardiogram    3. CORONARY ANGIOGRAPHY:  Dominance right  Left Main: Large-caliber vessel bifurcates into LAD circumflex artery 0% stenosis  LAD: Large-caliber vessel gives rise to couple of medium caliber diagonal arteries multiple septal branches reaches the apex mild luminal irregularities noted less than 10% stenosis  CIRC: Large-caliber vessel gives rise to marginal lateral branches less than 10% stenosis  RCA: Large-caliber dominant artery gives rise to PDA and PL branches less than 5 to 10% stenosis    SUMMARY: No obstructive coronary artery disease    RECOMMENDATIONS: Evaluate for noncardiac causes of symptoms aggressive cardiac risk factor modification       OTHER:         Assessment & Plan     Atrial fibrillation  Patient presented with palpitations and had atrial fibrillation with a rapid  mental response and once she started taking the medicines her blood pressure and heart rate are stable including Eliquis.  Patient is ruled out for MI.    Coronary artery disease  Patient has nonobstructive disease in the past with normal LV systolic function is stable now    Sick sinus syndrome  Patient is status post permanent pacemaker placement and the pacemaker is working very well    Hypertension  Patient blood pressure currently stable on metoprolol and Cardizem    Hyperlipidemia  Patient on statins and the lipid levels are well within normal limits.    I discussed the patients findings and my recommendations with patient and nurse    Flash Gonzalez MD  03/27/25  14:16 EDT

## 2025-03-28 ENCOUNTER — TRANSITIONAL CARE MANAGEMENT TELEPHONE ENCOUNTER (OUTPATIENT)
Dept: CALL CENTER | Facility: HOSPITAL | Age: 85
End: 2025-03-28
Payer: MEDICARE

## 2025-03-28 ENCOUNTER — NURSE TRIAGE (OUTPATIENT)
Dept: CALL CENTER | Facility: HOSPITAL | Age: 85
End: 2025-03-28
Payer: MEDICARE

## 2025-03-28 NOTE — OUTREACH NOTE
Call Center TCM Note      Flowsheet Row Responses   [facility] facility patient discharged from? [facility]   Does the patient have one of the following disease processes/diagnoses(primary or secondary)? Other   TCM attempt successful? No   Unsuccessful attempts Attempt 1            Alexa Coronado Registered Nurse    3/28/2025, 12:19 EDT

## 2025-03-28 NOTE — OUTREACH NOTE
Call Center TCM Note      Flowsheet Row Responses   [facility] facility patient discharged from? [facility]   Does the patient have one of the following disease processes/diagnoses(primary or secondary)? Other   TCM attempt successful? No   Unsuccessful attempts Attempt 2            Alexa Coronado Registered Nurse    3/28/2025, 13:15 EDT

## 2025-03-28 NOTE — TELEPHONE ENCOUNTER
"3/28/2025 0219 called back and Ms. Moreno states I got sick to my stomach denies emesis and refused for [facility] to call 911 for her and states calling now.       Caller called in to say having Chest Pressure for hours now woke her up. Caller about to give her information and states pain seven to eight. Caller advised to call 911 now and call ended.       Reason for Disposition   Heart attack suspected    Additional Information   Negative: CARDIAC ARREST suspected   Negative: Breathing stopped   Negative: Anaphylactic reaction (life-threatening allergic reaction)   Negative: Asthma attack, severe (e.g., struggling for each breath, unable to speak)   Negative: Bleeding (severe) from skin AND can't be stopped   Negative: Bleeding (severe) from nose, mouth, vomiting, anus, vagina AND can't be stopped   Negative: Breathing difficulty, severe (e.g., struggling for each breath, unable to speak)   Negative: Burn, severe (e.g., burn area larger than 20 palms of hand [>10% BSA] with blisters)   Negative: Choking, severe (e.g., unable to breathe, talk)   Negative: Coma (e.g., unconscious, not responding to verbal or painful stimulus)   Negative: Cyanosis (bluish or gray lips or face)   Negative: Dehydration, severe (e.g., cold/pale/clammy skin, too weak to stand)   Negative: Drowning, near   Negative: Electrical shock, severe    Answer Assessment - Initial Assessment Questions  1. SYMPTOMS: \"What is the most serious symptom?\"      Chest Pain/Pressure rates pain 7-8 woke her up hours ago and not getting better   2. AIRWAY: \"Are they breathing?\" (e.g., Yes, No)  (R/O: airway blockage, respiratory arrest)       Yes   3. BREATHING: \"Is there difficulty breathing?\" (e.g., Yes, No, Unknown; severity)  (R/O: respiratory distress)      No c/o   4. CIRCULATION: \"Are you feeling weak?\"  (e.g., Yes, No, Unknown; severity)  (R/O: shock)      Alert and able to give basic information   5. BLEEDING: \"Is there any bleeding?\" (e.g., " "Yes, No) If Yes, ask: \"How bad is the bleeding?\" (e.g., actively dripping or spurting)  (R/O: hemorrhage)      No c/o   6. CONSCIOUS: \"Are they awake and alert?\" (e.g., alert-oriented, confused, lethargic, stuporous, comatose) (R/O: altered mental status, coma)      Alert    Protocols used: 911 Symptoms-ADULT-AH    "

## 2025-03-29 ENCOUNTER — TRANSITIONAL CARE MANAGEMENT TELEPHONE ENCOUNTER (OUTPATIENT)
Dept: CALL CENTER | Facility: HOSPITAL | Age: 85
End: 2025-03-29
Payer: MEDICARE

## 2025-03-29 NOTE — OUTREACH NOTE
Call Center TCM Note      Flowsheet Row Responses   [facility] facility patient discharged from? [facility]   Does the patient have one of the following disease processes/diagnoses(primary or secondary)? Other   TCM attempt successful? No  [vr for Shwetha Cry, friend]   Unsuccessful attempts Attempt 3  [attempted pt and friend, Shwetha who reports she has been out of town so unable to update but is planning on checking on pt today]            SAMUEL WHITTEN - Registered Nurse    3/29/2025, 13:14 EDT

## 2025-04-04 LAB
QT INTERVAL: 450 MS
QTC INTERVAL: 488 MS

## 2025-04-17 RX ORDER — METOPROLOL SUCCINATE 100 MG/1
100 TABLET, EXTENDED RELEASE ORAL DAILY
Qty: 90 TABLET | Refills: 1 | Status: SHIPPED | OUTPATIENT
Start: 2025-04-17

## 2025-06-08 DIAGNOSIS — I48.91 ATRIAL FIBRILLATION WITH RVR: ICD-10-CM

## 2025-06-09 RX ORDER — APIXABAN 5 MG/1
TABLET, FILM COATED ORAL
Qty: 60 TABLET | Refills: 1 | Status: SHIPPED | OUTPATIENT
Start: 2025-06-09

## 2025-07-23 ENCOUNTER — TELEPHONE (OUTPATIENT)
Dept: FAMILY MEDICINE CLINIC | Facility: CLINIC | Age: 85
End: 2025-07-23
Payer: MEDICARE

## 2025-07-23 DIAGNOSIS — D50.8 OTHER IRON DEFICIENCY ANEMIA: ICD-10-CM

## 2025-07-23 RX ORDER — SPIRONOLACTONE 25 MG/1
TABLET ORAL
Qty: 90 TABLET | Refills: 1 | Status: SHIPPED | OUTPATIENT
Start: 2025-07-23

## 2025-07-23 RX ORDER — FERROUS GLUCONATE 324(38)MG
1 TABLET ORAL
Qty: 30 TABLET | Refills: 3 | Status: SHIPPED | OUTPATIENT
Start: 2025-07-23

## 2025-07-23 RX ORDER — ONDANSETRON 4 MG/1
4 TABLET, FILM COATED ORAL EVERY 8 HOURS PRN
Qty: 30 TABLET | Refills: 0 | Status: SHIPPED | OUTPATIENT
Start: 2025-07-23

## 2025-07-23 NOTE — TELEPHONE ENCOUNTER
Caller: Shwetha Eldridge    Relationship: Emergency Contact    Best call back number:     [phone]       What medication are you requesting: FINNEGRAN OR OTHER NAUSEA MED    What are your current symptoms: NAUSEA    How long have you been experiencing symptoms: A COUPLE DAYS    Have you had these symptoms before:    [x] Yes  [] No    Have you been treated for these symptoms before:   [x] Yes  [] No    If a prescription is needed, what is your preferred pharmacy and phone number: [id number] - [place], [place] - [address] AT [address] [facility] [facility] - [phone] [facility] [phone] FX

## 2025-08-04 LAB
MC_CV_MDC_IDC_RATE_1: 160
MC_CV_MDC_IDC_ZONE_ID: 1
MDC_IDC_MSMT_BATTERY_REMAINING_LONGEVITY: 48 MO
MDC_IDC_MSMT_BATTERY_REMAINING_PERCENTAGE: 75 %
MDC_IDC_MSMT_BATTERY_STATUS: NORMAL
MDC_IDC_MSMT_LEADCHNL_RA_DTM: NORMAL
MDC_IDC_MSMT_LEADCHNL_RA_IMPEDANCE_VALUE: 773
MDC_IDC_MSMT_LEADCHNL_RA_PACING_THRESHOLD_POLARITY: NORMAL
MDC_IDC_MSMT_LEADCHNL_RA_SENSING_INTR_AMPL: 2
MDC_IDC_MSMT_LEADCHNL_RV_DTM: NORMAL
MDC_IDC_MSMT_LEADCHNL_RV_IMPEDANCE_VALUE: 751
MDC_IDC_MSMT_LEADCHNL_RV_PACING_THRESHOLD_AMPLITUDE: 2.2
MDC_IDC_MSMT_LEADCHNL_RV_PACING_THRESHOLD_POLARITY: NORMAL
MDC_IDC_MSMT_LEADCHNL_RV_PACING_THRESHOLD_PULSEWIDTH: 0.4
MDC_IDC_MSMT_LEADCHNL_RV_SENSING_INTR_AMPL: 9.1
MDC_IDC_PG_IMPLANT_DTM: NORMAL
MDC_IDC_PG_MFG: NORMAL
MDC_IDC_PG_MODEL: NORMAL
MDC_IDC_PG_SERIAL: NORMAL
MDC_IDC_PG_TYPE: NORMAL
MDC_IDC_SESS_DTM: NORMAL
MDC_IDC_SESS_TYPE: NORMAL
MDC_IDC_SET_BRADY_AT_MODE_SWITCH_RATE: 170
MDC_IDC_SET_BRADY_LOWRATE: 70
MDC_IDC_SET_BRADY_MAX_SENSOR_RATE: 130
MDC_IDC_SET_BRADY_MAX_TRACKING_RATE: 130
MDC_IDC_SET_BRADY_MODE: NORMAL
MDC_IDC_SET_BRADY_PAV_DELAY: 150
MDC_IDC_SET_BRADY_SAV_DELAY: 150
MDC_IDC_SET_LEADCHNL_RA_PACING_AMPLITUDE: 3.5
MDC_IDC_SET_LEADCHNL_RA_PACING_POLARITY: NORMAL
MDC_IDC_SET_LEADCHNL_RA_PACING_PULSEWIDTH: 0.4
MDC_IDC_SET_LEADCHNL_RA_SENSING_POLARITY: NORMAL
MDC_IDC_SET_LEADCHNL_RA_SENSING_SENSITIVITY: 0.5
MDC_IDC_SET_LEADCHNL_RV_PACING_AMPLITUDE: 2.5
MDC_IDC_SET_LEADCHNL_RV_PACING_POLARITY: NORMAL
MDC_IDC_SET_LEADCHNL_RV_PACING_PULSEWIDTH: 0.4
MDC_IDC_SET_LEADCHNL_RV_SENSING_POLARITY: NORMAL
MDC_IDC_SET_LEADCHNL_RV_SENSING_SENSITIVITY: 2.5
MDC_IDC_SET_ZONE_STATUS: NORMAL
MDC_IDC_SET_ZONE_TYPE: NORMAL
MDC_IDC_STAT_AT_BURDEN_PERCENT: 98
MDC_IDC_STAT_BRADY_RA_PERCENT_PACED: 0
MDC_IDC_STAT_BRADY_RV_PERCENT_PACED: 20

## 2025-08-20 LAB
MC_CV_MDC_IDC_RATE_1: 160
MC_CV_MDC_IDC_ZONE_ID: 1
MDC_IDC_MSMT_BATTERY_REMAINING_LONGEVITY: 48 MO
MDC_IDC_MSMT_BATTERY_REMAINING_PERCENTAGE: 75 %
MDC_IDC_MSMT_BATTERY_STATUS: NORMAL
MDC_IDC_MSMT_LEADCHNL_RA_DTM: NORMAL
MDC_IDC_MSMT_LEADCHNL_RA_IMPEDANCE_VALUE: 773
MDC_IDC_MSMT_LEADCHNL_RA_PACING_THRESHOLD_POLARITY: NORMAL
MDC_IDC_MSMT_LEADCHNL_RA_SENSING_INTR_AMPL: 2
MDC_IDC_MSMT_LEADCHNL_RV_DTM: NORMAL
MDC_IDC_MSMT_LEADCHNL_RV_IMPEDANCE_VALUE: 751
MDC_IDC_MSMT_LEADCHNL_RV_PACING_THRESHOLD_AMPLITUDE: 2.2
MDC_IDC_MSMT_LEADCHNL_RV_PACING_THRESHOLD_POLARITY: NORMAL
MDC_IDC_MSMT_LEADCHNL_RV_PACING_THRESHOLD_PULSEWIDTH: 0.4
MDC_IDC_MSMT_LEADCHNL_RV_SENSING_INTR_AMPL: 9.1
MDC_IDC_PG_IMPLANT_DTM: NORMAL
MDC_IDC_PG_MFG: NORMAL
MDC_IDC_PG_MODEL: NORMAL
MDC_IDC_PG_SERIAL: NORMAL
MDC_IDC_PG_TYPE: NORMAL
MDC_IDC_SESS_DTM: NORMAL
MDC_IDC_SESS_TYPE: NORMAL
MDC_IDC_SET_BRADY_AT_MODE_SWITCH_RATE: 170
MDC_IDC_SET_BRADY_LOWRATE: 70
MDC_IDC_SET_BRADY_MAX_SENSOR_RATE: 130
MDC_IDC_SET_BRADY_MAX_TRACKING_RATE: 130
MDC_IDC_SET_BRADY_MODE: NORMAL
MDC_IDC_SET_BRADY_PAV_DELAY: 150
MDC_IDC_SET_BRADY_SAV_DELAY: 150
MDC_IDC_SET_LEADCHNL_RA_PACING_AMPLITUDE: 3.5
MDC_IDC_SET_LEADCHNL_RA_PACING_POLARITY: NORMAL
MDC_IDC_SET_LEADCHNL_RA_PACING_PULSEWIDTH: 0.4
MDC_IDC_SET_LEADCHNL_RA_SENSING_POLARITY: NORMAL
MDC_IDC_SET_LEADCHNL_RA_SENSING_SENSITIVITY: 0.5
MDC_IDC_SET_LEADCHNL_RV_PACING_AMPLITUDE: 2.5
MDC_IDC_SET_LEADCHNL_RV_PACING_POLARITY: NORMAL
MDC_IDC_SET_LEADCHNL_RV_PACING_PULSEWIDTH: 0.4
MDC_IDC_SET_LEADCHNL_RV_SENSING_POLARITY: NORMAL
MDC_IDC_SET_LEADCHNL_RV_SENSING_SENSITIVITY: 2.5
MDC_IDC_SET_ZONE_STATUS: NORMAL
MDC_IDC_SET_ZONE_TYPE: NORMAL
MDC_IDC_STAT_AT_BURDEN_PERCENT: 98
MDC_IDC_STAT_BRADY_RA_PERCENT_PACED: 0
MDC_IDC_STAT_BRADY_RV_PERCENT_PACED: 20

## (undated) DEVICE — RADIFOCUS GLIDEWIRE: Brand: GLIDEWIRE

## (undated) DEVICE — SUT MNCRYL 3/0 SH 27 IN Y416H

## (undated) DEVICE — BITEBLOCK ENDO W/STRAP 60F A/ LF DISP

## (undated) DEVICE — TBG PENCL TELESCP MEGADYNE SMOKE EVAC 10FT

## (undated) DEVICE — SUT ETHIB 0/0 MO6 I8IN CX45D

## (undated) DEVICE — PACEMAKER CDS: Brand: MEDLINE INDUSTRIES, INC.

## (undated) DEVICE — INTRO SHEATH PRELUDE SNAP .038 9F 13CM W/SDPRT BLK

## (undated) DEVICE — PK ENDO GI 50

## (undated) DEVICE — ST ACC MICROPUNCTURE STFF/CANN PLAT/TP 4F 21G 40CM

## (undated) DEVICE — SNAR POLYP HOTSNARE/BRAIDED OVL/MINI 7F 2.8X10MM 230CM 1P/U

## (undated) DEVICE — 3M™ IOBAN™ 2 ANTIMICROBIAL INCISE DRAPE 6650EZ: Brand: IOBAN™ 2

## (undated) DEVICE — PK TRY HEART CATH 50

## (undated) DEVICE — REFLEX ONE, SKIN STAPLER, 35 WIDE: Brand: REFLEX

## (undated) DEVICE — SUT MNCRYL 2/0 CT1 36IN

## (undated) DEVICE — PINNACLE INTRODUCER SHEATH: Brand: PINNACLE

## (undated) DEVICE — CATH DIAG IMPULSE PIG 5F 100CM

## (undated) DEVICE — CABL BIPOL W/ALLGTR CLIP/SM 12FT

## (undated) DEVICE — INTRO SHEATH PRELUDE SNAP .038 6F 13CM W/SDPRT

## (undated) DEVICE — CATH DIAG IMPULSE FL4 5F 100CM

## (undated) DEVICE — IMMOB SHLDR CUT/AWAY UNIV

## (undated) DEVICE — DRSNG WND BORDR/ADHS NONADHR/GZ LF 4X4IN STRL

## (undated) DEVICE — Device

## (undated) DEVICE — TRAP WIDEEYE POLYP

## (undated) DEVICE — ELECTRD DEFIB M/FUNC PROPADZ RADIOL 2PK

## (undated) DEVICE — RADIFOCUS OBTURATOR: Brand: RADIFOCUS

## (undated) DEVICE — ADHS LIQ MASTISOL 2/3ML

## (undated) DEVICE — 3M™ PATIENT PLATE, CORDED, SPLIT, LARGE, 40 PER CASE, 1179: Brand: 3M™

## (undated) DEVICE — GW PTFE EMERALD HEPCOAT FC J TIP STD .035 3MM 150CM

## (undated) DEVICE — ANGIO-SEAL VIP VASCULAR CLOSURE DEVICE: Brand: ANGIO-SEAL

## (undated) DEVICE — CATH DIAG IMPULSE FR4 5F 100CM